# Patient Record
Sex: FEMALE | Race: BLACK OR AFRICAN AMERICAN | Employment: OTHER | URBAN - METROPOLITAN AREA
[De-identification: names, ages, dates, MRNs, and addresses within clinical notes are randomized per-mention and may not be internally consistent; named-entity substitution may affect disease eponyms.]

---

## 2017-01-02 DIAGNOSIS — I10 ESSENTIAL HYPERTENSION: ICD-10-CM

## 2017-01-02 DIAGNOSIS — M54.50 ACUTE BILATERAL LOW BACK PAIN WITHOUT SCIATICA: ICD-10-CM

## 2017-01-02 DIAGNOSIS — R80.9 MICROALBUMINURIA: ICD-10-CM

## 2017-01-02 RX ORDER — LISINOPRIL 10 MG/1
TABLET ORAL
Qty: 30 TABLET | Refills: 1 | Status: SHIPPED | OUTPATIENT
Start: 2017-01-02 | End: 2017-01-14 | Stop reason: SDUPTHER

## 2017-01-03 ENCOUNTER — OFFICE VISIT (OUTPATIENT)
Dept: FAMILY MEDICINE CLINIC | Age: 57
End: 2017-01-03

## 2017-01-03 VITALS
SYSTOLIC BLOOD PRESSURE: 114 MMHG | BODY MASS INDEX: 36.23 KG/M2 | HEART RATE: 81 BPM | HEIGHT: 63 IN | WEIGHT: 204.5 LBS | RESPIRATION RATE: 16 BRPM | TEMPERATURE: 98.9 F | OXYGEN SATURATION: 98 % | DIASTOLIC BLOOD PRESSURE: 62 MMHG

## 2017-01-03 DIAGNOSIS — I10 ESSENTIAL HYPERTENSION: ICD-10-CM

## 2017-01-03 DIAGNOSIS — Z79.4 CONTROLLED TYPE 2 DIABETES MELLITUS WITH MICROALBUMINURIA, WITH LONG-TERM CURRENT USE OF INSULIN (HCC): ICD-10-CM

## 2017-01-03 DIAGNOSIS — R05.9 COUGH: ICD-10-CM

## 2017-01-03 DIAGNOSIS — E11.29 CONTROLLED TYPE 2 DIABETES MELLITUS WITH MICROALBUMINURIA, WITH LONG-TERM CURRENT USE OF INSULIN (HCC): ICD-10-CM

## 2017-01-03 DIAGNOSIS — M54.50 ACUTE BILATERAL LOW BACK PAIN WITHOUT SCIATICA: ICD-10-CM

## 2017-01-03 DIAGNOSIS — E66.09 NON MORBID OBESITY DUE TO EXCESS CALORIES: ICD-10-CM

## 2017-01-03 DIAGNOSIS — R80.9 CONTROLLED TYPE 2 DIABETES MELLITUS WITH MICROALBUMINURIA, WITH LONG-TERM CURRENT USE OF INSULIN (HCC): ICD-10-CM

## 2017-01-03 DIAGNOSIS — J01.00 ACUTE NON-RECURRENT MAXILLARY SINUSITIS: Primary | ICD-10-CM

## 2017-01-03 PROCEDURE — 99214 OFFICE O/P EST MOD 30 MIN: CPT | Performed by: FAMILY MEDICINE

## 2017-01-03 RX ORDER — LEVOFLOXACIN 750 MG/1
750 TABLET ORAL DAILY
Qty: 5 TABLET | Refills: 0 | Status: SHIPPED | OUTPATIENT
Start: 2017-01-03 | End: 2017-01-08

## 2017-01-03 RX ORDER — LEVOFLOXACIN 750 MG/1
750 TABLET ORAL DAILY
Qty: 5 TABLET | Refills: 0 | Status: SHIPPED | OUTPATIENT
Start: 2017-01-03 | End: 2017-01-03 | Stop reason: SDUPTHER

## 2017-01-03 RX ORDER — BENZONATATE 100 MG/1
100 CAPSULE ORAL 3 TIMES DAILY PRN
Qty: 21 CAPSULE | Refills: 0 | Status: SHIPPED | OUTPATIENT
Start: 2017-01-03 | End: 2017-04-12

## 2017-01-03 ASSESSMENT — ENCOUNTER SYMPTOMS
PHOTOPHOBIA: 0
CHOKING: 0
CHEST TIGHTNESS: 0
RHINORRHEA: 1
SORE THROAT: 0
FACIAL SWELLING: 0
EYE PAIN: 0
ANAL BLEEDING: 0
STRIDOR: 0
DIARRHEA: 0
SINUS PRESSURE: 1
ABDOMINAL DISTENTION: 0
TROUBLE SWALLOWING: 0
WHEEZING: 0
VOMITING: 0
COUGH: 1
CONSTIPATION: 0
BLOOD IN STOOL: 0
SHORTNESS OF BREATH: 0
ABDOMINAL PAIN: 0
EYE ITCHING: 0
EYE DISCHARGE: 0
VOICE CHANGE: 0
APNEA: 0
RECTAL PAIN: 0
NAUSEA: 0
EYE REDNESS: 0
BACK PAIN: 1

## 2017-01-05 ENCOUNTER — TELEPHONE (OUTPATIENT)
Dept: FAMILY MEDICINE CLINIC | Age: 57
End: 2017-01-05

## 2017-01-11 ENCOUNTER — TELEPHONE (OUTPATIENT)
Dept: FAMILY MEDICINE CLINIC | Age: 57
End: 2017-01-11

## 2017-01-12 ENCOUNTER — TELEPHONE (OUTPATIENT)
Dept: FAMILY MEDICINE CLINIC | Age: 57
End: 2017-01-12

## 2017-01-13 ENCOUNTER — TELEPHONE (OUTPATIENT)
Dept: FAMILY MEDICINE CLINIC | Age: 57
End: 2017-01-13

## 2017-01-14 DIAGNOSIS — I10 ESSENTIAL HYPERTENSION: ICD-10-CM

## 2017-01-14 DIAGNOSIS — R80.9 MICROALBUMINURIA: ICD-10-CM

## 2017-01-14 RX ORDER — FLUTICASONE PROPIONATE 50 MCG
1 SPRAY, SUSPENSION (ML) NASAL DAILY
Qty: 1 BOTTLE | Refills: 0 | Status: SHIPPED | OUTPATIENT
Start: 2017-01-14 | End: 2017-04-12

## 2017-01-14 RX ORDER — LISINOPRIL 10 MG/1
TABLET ORAL
Qty: 30 TABLET | Refills: 1 | Status: SHIPPED | OUTPATIENT
Start: 2017-01-14 | End: 2017-04-29 | Stop reason: SDUPTHER

## 2017-01-23 RX ORDER — SIMVASTATIN 20 MG
TABLET ORAL
Qty: 30 TABLET | Refills: 2 | Status: SHIPPED | OUTPATIENT
Start: 2017-01-23 | End: 2017-04-12 | Stop reason: SDUPTHER

## 2017-02-05 DIAGNOSIS — M54.50 ACUTE BILATERAL LOW BACK PAIN WITHOUT SCIATICA: ICD-10-CM

## 2017-02-07 ENCOUNTER — OFFICE VISIT (OUTPATIENT)
Dept: FAMILY MEDICINE CLINIC | Age: 57
End: 2017-02-07

## 2017-02-07 VITALS
SYSTOLIC BLOOD PRESSURE: 131 MMHG | HEIGHT: 63 IN | RESPIRATION RATE: 16 BRPM | OXYGEN SATURATION: 98 % | WEIGHT: 206.9 LBS | HEART RATE: 81 BPM | BODY MASS INDEX: 36.66 KG/M2 | DIASTOLIC BLOOD PRESSURE: 75 MMHG | TEMPERATURE: 98 F

## 2017-02-07 DIAGNOSIS — Z79.4 CONTROLLED TYPE 2 DIABETES MELLITUS WITHOUT COMPLICATION, WITH LONG-TERM CURRENT USE OF INSULIN (HCC): ICD-10-CM

## 2017-02-07 DIAGNOSIS — E78.5 HYPERLIPIDEMIA LDL GOAL <100: ICD-10-CM

## 2017-02-07 DIAGNOSIS — I10 ESSENTIAL HYPERTENSION: ICD-10-CM

## 2017-02-07 DIAGNOSIS — E11.9 CONTROLLED TYPE 2 DIABETES MELLITUS WITHOUT COMPLICATION, WITH LONG-TERM CURRENT USE OF INSULIN (HCC): ICD-10-CM

## 2017-02-07 DIAGNOSIS — E66.09 NON MORBID OBESITY DUE TO EXCESS CALORIES: ICD-10-CM

## 2017-02-07 DIAGNOSIS — M54.50 ACUTE BILATERAL LOW BACK PAIN WITHOUT SCIATICA: Primary | ICD-10-CM

## 2017-02-07 PROCEDURE — 99214 OFFICE O/P EST MOD 30 MIN: CPT | Performed by: FAMILY MEDICINE

## 2017-02-07 RX ORDER — CYCLOBENZAPRINE HCL 5 MG
5 TABLET ORAL EVERY 8 HOURS PRN
Qty: 30 TABLET | Refills: 0 | Status: SHIPPED | OUTPATIENT
Start: 2017-02-07 | End: 2017-04-11 | Stop reason: SDUPTHER

## 2017-02-07 ASSESSMENT — ENCOUNTER SYMPTOMS
APNEA: 0
ABDOMINAL PAIN: 0
COUGH: 0
NAUSEA: 0
VOMITING: 0
CONSTIPATION: 0
DIARRHEA: 0
ABDOMINAL DISTENTION: 0
WHEEZING: 0
SHORTNESS OF BREATH: 0

## 2017-02-13 ENCOUNTER — TELEPHONE (OUTPATIENT)
Dept: FAMILY MEDICINE CLINIC | Age: 57
End: 2017-02-13

## 2017-02-13 DIAGNOSIS — M54.50 ACUTE BILATERAL LOW BACK PAIN WITHOUT SCIATICA: Primary | ICD-10-CM

## 2017-02-13 DIAGNOSIS — Z86.73 HISTORY OF TIA (TRANSIENT ISCHEMIC ATTACK): ICD-10-CM

## 2017-03-02 ENCOUNTER — HOSPITAL ENCOUNTER (OUTPATIENT)
Dept: OTHER | Age: 57
Discharge: HOME | End: 2017-03-02
Attending: FAMILY MEDICINE | Admitting: FAMILY MEDICINE

## 2017-03-02 DIAGNOSIS — M54.50 ACUTE BILATERAL LOW BACK PAIN WITHOUT SCIATICA: ICD-10-CM

## 2017-03-02 LAB
A/G RATIO: 1.1 (ref 1.1–2.2)
ALBUMIN SERPL-MCNC: 4.3 G/DL (ref 3.4–5)
ALP BLD-CCNC: 119 U/L (ref 40–129)
ALT SERPL-CCNC: 26 U/L (ref 10–40)
ANION GAP SERPL CALCULATED.3IONS-SCNC: 15 MMOL/L (ref 3–16)
AST SERPL-CCNC: 21 U/L (ref 15–37)
BILIRUB SERPL-MCNC: 0.3 MG/DL (ref 0–1)
BUN BLDV-MCNC: 19 MG/DL (ref 7–20)
CALCIUM SERPL-MCNC: 10.4 MG/DL (ref 8.3–10.6)
CHLORIDE BLD-SCNC: 99 MMOL/L (ref 99–110)
CHOLESTEROL, TOTAL: 143 MG/DL (ref 0–199)
CO2: 25 MMOL/L (ref 21–32)
CREAT SERPL-MCNC: 1 MG/DL (ref 0.6–1.1)
GFR AFRICAN AMERICAN: >60
GFR NON-AFRICAN AMERICAN: 57
GLOBULIN: 3.8 G/DL
GLUCOSE BLD-MCNC: 115 MG/DL (ref 70–99)
HDLC SERPL-MCNC: 67 MG/DL (ref 40–60)
LDL CHOLESTEROL CALCULATED: 57 MG/DL
POTASSIUM SERPL-SCNC: 4.6 MMOL/L (ref 3.5–5.1)
SODIUM BLD-SCNC: 139 MMOL/L (ref 136–145)
TOTAL PROTEIN: 8.1 G/DL (ref 6.4–8.2)
TRIGL SERPL-MCNC: 93 MG/DL (ref 0–150)
VLDLC SERPL CALC-MCNC: 19 MG/DL

## 2017-03-10 ENCOUNTER — OFFICE VISIT (OUTPATIENT)
Dept: FAMILY MEDICINE CLINIC | Age: 57
End: 2017-03-10

## 2017-03-10 VITALS
WEIGHT: 210.4 LBS | OXYGEN SATURATION: 98 % | DIASTOLIC BLOOD PRESSURE: 61 MMHG | RESPIRATION RATE: 16 BRPM | HEART RATE: 81 BPM | TEMPERATURE: 97.8 F | HEIGHT: 63 IN | SYSTOLIC BLOOD PRESSURE: 132 MMHG | BODY MASS INDEX: 37.28 KG/M2

## 2017-03-10 DIAGNOSIS — Z79.4 CONTROLLED TYPE 2 DIABETES MELLITUS WITHOUT COMPLICATION, WITH LONG-TERM CURRENT USE OF INSULIN (HCC): ICD-10-CM

## 2017-03-10 DIAGNOSIS — E11.9 CONTROLLED TYPE 2 DIABETES MELLITUS WITHOUT COMPLICATION, WITH LONG-TERM CURRENT USE OF INSULIN (HCC): ICD-10-CM

## 2017-03-10 DIAGNOSIS — M47.816 SPONDYLOSIS OF LUMBAR REGION WITHOUT MYELOPATHY OR RADICULOPATHY: ICD-10-CM

## 2017-03-10 DIAGNOSIS — Z12.11 COLON CANCER SCREENING: ICD-10-CM

## 2017-03-10 DIAGNOSIS — E66.09 NON MORBID OBESITY DUE TO EXCESS CALORIES: ICD-10-CM

## 2017-03-10 DIAGNOSIS — E78.5 HYPERLIPIDEMIA LDL GOAL <100: ICD-10-CM

## 2017-03-10 DIAGNOSIS — I10 ESSENTIAL HYPERTENSION: Primary | ICD-10-CM

## 2017-03-10 DIAGNOSIS — M54.50 ACUTE BILATERAL LOW BACK PAIN WITHOUT SCIATICA: ICD-10-CM

## 2017-03-10 PROCEDURE — 99214 OFFICE O/P EST MOD 30 MIN: CPT | Performed by: FAMILY MEDICINE

## 2017-03-10 ASSESSMENT — ENCOUNTER SYMPTOMS
CHEST TIGHTNESS: 0
APNEA: 0
VOMITING: 0
ABDOMINAL PAIN: 0
ANAL BLEEDING: 0
ABDOMINAL DISTENTION: 0
SHORTNESS OF BREATH: 0
NAUSEA: 0
WHEEZING: 0
RECTAL PAIN: 0
CHOKING: 0
BLOOD IN STOOL: 0
STRIDOR: 0
CONSTIPATION: 0
DIARRHEA: 0
COUGH: 0

## 2017-03-13 DIAGNOSIS — M54.50 ACUTE BILATERAL LOW BACK PAIN WITHOUT SCIATICA: ICD-10-CM

## 2017-03-16 ENCOUNTER — TELEPHONE (OUTPATIENT)
Dept: FAMILY MEDICINE CLINIC | Age: 57
End: 2017-03-16

## 2017-03-24 ENCOUNTER — TELEPHONE (OUTPATIENT)
Dept: FAMILY MEDICINE CLINIC | Age: 57
End: 2017-03-24

## 2017-03-24 ENCOUNTER — HOSPITAL ENCOUNTER (OUTPATIENT)
Dept: PHYSICAL THERAPY | Age: 57
Discharge: HOME | End: 2017-03-31
Admitting: FAMILY MEDICINE

## 2017-03-24 DIAGNOSIS — M54.50 ACUTE BILATERAL LOW BACK PAIN WITHOUT SCIATICA: Primary | ICD-10-CM

## 2017-03-24 ASSESSMENT — PAIN DESCRIPTION - PAIN TYPE: TYPE: CHRONIC PAIN

## 2017-03-24 ASSESSMENT — PAIN SCALES - GENERAL: PAINLEVEL_OUTOF10: 4

## 2017-03-30 ENCOUNTER — HOSPITAL ENCOUNTER (OUTPATIENT)
Dept: PHYSICAL THERAPY | Age: 57
Discharge: HOME | End: 2017-03-30
Admitting: FAMILY MEDICINE

## 2017-04-10 ENCOUNTER — TELEPHONE (OUTPATIENT)
Dept: FAMILY MEDICINE CLINIC | Age: 57
End: 2017-04-10

## 2017-04-10 DIAGNOSIS — Z12.11 COLON CANCER SCREENING: Primary | ICD-10-CM

## 2017-04-11 DIAGNOSIS — M54.50 ACUTE BILATERAL LOW BACK PAIN WITHOUT SCIATICA: ICD-10-CM

## 2017-04-11 RX ORDER — CYCLOBENZAPRINE HCL 5 MG
TABLET ORAL
Qty: 30 TABLET | Refills: 0 | Status: SHIPPED | OUTPATIENT
Start: 2017-04-11 | End: 2017-05-08 | Stop reason: SDUPTHER

## 2017-04-12 ENCOUNTER — OFFICE VISIT (OUTPATIENT)
Dept: FAMILY MEDICINE CLINIC | Age: 57
End: 2017-04-12

## 2017-04-12 VITALS
RESPIRATION RATE: 16 BRPM | WEIGHT: 208 LBS | BODY MASS INDEX: 36.86 KG/M2 | HEART RATE: 81 BPM | TEMPERATURE: 98.5 F | DIASTOLIC BLOOD PRESSURE: 66 MMHG | HEIGHT: 63 IN | SYSTOLIC BLOOD PRESSURE: 135 MMHG | OXYGEN SATURATION: 98 %

## 2017-04-12 DIAGNOSIS — E78.5 HYPERLIPIDEMIA LDL GOAL <100: ICD-10-CM

## 2017-04-12 DIAGNOSIS — M54.50 ACUTE BILATERAL LOW BACK PAIN WITHOUT SCIATICA: ICD-10-CM

## 2017-04-12 DIAGNOSIS — E11.9 CONTROLLED TYPE 2 DIABETES MELLITUS WITHOUT COMPLICATION, WITH LONG-TERM CURRENT USE OF INSULIN (HCC): ICD-10-CM

## 2017-04-12 DIAGNOSIS — E66.09 NON MORBID OBESITY DUE TO EXCESS CALORIES: ICD-10-CM

## 2017-04-12 DIAGNOSIS — I10 ESSENTIAL HYPERTENSION: Primary | ICD-10-CM

## 2017-04-12 DIAGNOSIS — M47.816 SPONDYLOSIS OF LUMBAR REGION WITHOUT MYELOPATHY OR RADICULOPATHY: ICD-10-CM

## 2017-04-12 DIAGNOSIS — Z79.4 CONTROLLED TYPE 2 DIABETES MELLITUS WITHOUT COMPLICATION, WITH LONG-TERM CURRENT USE OF INSULIN (HCC): ICD-10-CM

## 2017-04-12 PROCEDURE — 99214 OFFICE O/P EST MOD 30 MIN: CPT | Performed by: FAMILY MEDICINE

## 2017-04-12 RX ORDER — SIMVASTATIN 20 MG
TABLET ORAL
Qty: 30 TABLET | Refills: 2 | Status: SHIPPED | OUTPATIENT
Start: 2017-04-12 | End: 2017-07-19 | Stop reason: SDUPTHER

## 2017-04-12 ASSESSMENT — ENCOUNTER SYMPTOMS
WHEEZING: 0
STRIDOR: 0
SHORTNESS OF BREATH: 0
BLOOD IN STOOL: 0
RECTAL PAIN: 0
CONSTIPATION: 0
DIARRHEA: 0
NAUSEA: 0
ABDOMINAL PAIN: 0
ABDOMINAL DISTENTION: 0
VOMITING: 0
APNEA: 0
CHEST TIGHTNESS: 0
CHOKING: 0
COUGH: 0
ANAL BLEEDING: 0

## 2017-04-25 ENCOUNTER — HOSPITAL ENCOUNTER (OUTPATIENT)
Dept: PHYSICAL THERAPY | Age: 57
Discharge: HOME | End: 2017-04-25
Admitting: FAMILY MEDICINE

## 2017-04-25 ENCOUNTER — TELEPHONE (OUTPATIENT)
Dept: FAMILY MEDICINE CLINIC | Age: 57
End: 2017-04-25

## 2017-04-25 DIAGNOSIS — M54.50 ACUTE BILATERAL LOW BACK PAIN WITHOUT SCIATICA: ICD-10-CM

## 2017-04-25 DIAGNOSIS — M47.816 SPONDYLOSIS OF LUMBAR REGION WITHOUT MYELOPATHY OR RADICULOPATHY: ICD-10-CM

## 2017-04-25 DIAGNOSIS — E11.9 CONTROLLED TYPE 2 DIABETES MELLITUS WITHOUT COMPLICATION, WITH LONG-TERM CURRENT USE OF INSULIN (HCC): ICD-10-CM

## 2017-04-25 DIAGNOSIS — Z79.4 CONTROLLED TYPE 2 DIABETES MELLITUS WITHOUT COMPLICATION, WITH LONG-TERM CURRENT USE OF INSULIN (HCC): ICD-10-CM

## 2017-04-25 DIAGNOSIS — I10 ESSENTIAL HYPERTENSION: Primary | ICD-10-CM

## 2017-04-25 DIAGNOSIS — E66.09 NON MORBID OBESITY DUE TO EXCESS CALORIES: ICD-10-CM

## 2017-04-29 DIAGNOSIS — R80.9 MICROALBUMINURIA: ICD-10-CM

## 2017-04-29 DIAGNOSIS — I10 ESSENTIAL HYPERTENSION: ICD-10-CM

## 2017-04-29 RX ORDER — LISINOPRIL 10 MG/1
TABLET ORAL
Qty: 30 TABLET | Refills: 1 | Status: SHIPPED | OUTPATIENT
Start: 2017-04-29 | End: 2017-06-26 | Stop reason: SDUPTHER

## 2017-05-02 ENCOUNTER — HOSPITAL ENCOUNTER (OUTPATIENT)
Dept: PHYSICAL THERAPY | Age: 57
Discharge: HOME | End: 2017-05-02
Admitting: FAMILY MEDICINE

## 2017-05-04 ENCOUNTER — HOSPITAL ENCOUNTER (OUTPATIENT)
Dept: PHYSICAL THERAPY | Age: 57
Discharge: HOME | End: 2017-05-04
Admitting: FAMILY MEDICINE

## 2017-05-08 DIAGNOSIS — M54.50 ACUTE BILATERAL LOW BACK PAIN WITHOUT SCIATICA: ICD-10-CM

## 2017-05-08 RX ORDER — CYCLOBENZAPRINE HCL 5 MG
TABLET ORAL
Qty: 30 TABLET | Refills: 0 | Status: SHIPPED | OUTPATIENT
Start: 2017-05-08 | End: 2017-06-10 | Stop reason: SDUPTHER

## 2017-05-12 ENCOUNTER — OFFICE VISIT (OUTPATIENT)
Dept: FAMILY MEDICINE CLINIC | Age: 57
End: 2017-05-12

## 2017-05-12 VITALS
OXYGEN SATURATION: 98 % | HEART RATE: 81 BPM | BODY MASS INDEX: 37.56 KG/M2 | WEIGHT: 212 LBS | TEMPERATURE: 98.8 F | DIASTOLIC BLOOD PRESSURE: 72 MMHG | RESPIRATION RATE: 16 BRPM | SYSTOLIC BLOOD PRESSURE: 118 MMHG | HEIGHT: 63 IN

## 2017-05-12 DIAGNOSIS — I10 ESSENTIAL HYPERTENSION: Primary | ICD-10-CM

## 2017-05-12 DIAGNOSIS — M54.50 ACUTE BILATERAL LOW BACK PAIN WITHOUT SCIATICA: ICD-10-CM

## 2017-05-12 DIAGNOSIS — R80.9 CONTROLLED TYPE 2 DIABETES MELLITUS WITH MICROALBUMINURIA, WITH LONG-TERM CURRENT USE OF INSULIN (HCC): ICD-10-CM

## 2017-05-12 DIAGNOSIS — Z79.4 CONTROLLED TYPE 2 DIABETES MELLITUS WITH MICROALBUMINURIA, WITH LONG-TERM CURRENT USE OF INSULIN (HCC): ICD-10-CM

## 2017-05-12 DIAGNOSIS — E78.5 HYPERLIPIDEMIA LDL GOAL <100: ICD-10-CM

## 2017-05-12 DIAGNOSIS — E66.09 NON MORBID OBESITY DUE TO EXCESS CALORIES: ICD-10-CM

## 2017-05-12 DIAGNOSIS — E11.29 CONTROLLED TYPE 2 DIABETES MELLITUS WITH MICROALBUMINURIA, WITH LONG-TERM CURRENT USE OF INSULIN (HCC): ICD-10-CM

## 2017-05-12 PROCEDURE — 99214 OFFICE O/P EST MOD 30 MIN: CPT | Performed by: FAMILY MEDICINE

## 2017-05-12 ASSESSMENT — ENCOUNTER SYMPTOMS
ABDOMINAL DISTENTION: 0
WHEEZING: 0
SHORTNESS OF BREATH: 0
RECTAL PAIN: 0
APNEA: 0
BLOOD IN STOOL: 0
COUGH: 0
VOMITING: 0
DIARRHEA: 0
CONSTIPATION: 0
NAUSEA: 0
STRIDOR: 0
ABDOMINAL PAIN: 0
CHOKING: 0
ANAL BLEEDING: 0
CHEST TIGHTNESS: 0

## 2017-05-12 ASSESSMENT — PATIENT HEALTH QUESTIONNAIRE - PHQ9
2. FEELING DOWN, DEPRESSED OR HOPELESS: 0
1. LITTLE INTEREST OR PLEASURE IN DOING THINGS: 0
SUM OF ALL RESPONSES TO PHQ9 QUESTIONS 1 & 2: 0
SUM OF ALL RESPONSES TO PHQ QUESTIONS 1-9: 0

## 2017-05-15 DIAGNOSIS — M54.50 ACUTE BILATERAL LOW BACK PAIN WITHOUT SCIATICA: ICD-10-CM

## 2017-05-16 ENCOUNTER — HOSPITAL ENCOUNTER (OUTPATIENT)
Dept: PHYSICAL THERAPY | Age: 57
Discharge: HOME | End: 2017-05-16
Admitting: FAMILY MEDICINE

## 2017-05-23 ENCOUNTER — HOSPITAL ENCOUNTER (OUTPATIENT)
Dept: PHYSICAL THERAPY | Age: 57
Discharge: HOME | End: 2017-05-23
Admitting: FAMILY MEDICINE

## 2017-06-01 DIAGNOSIS — Z79.4 CONTROLLED TYPE 2 DIABETES MELLITUS WITH MICROALBUMINURIA, WITH LONG-TERM CURRENT USE OF INSULIN (HCC): ICD-10-CM

## 2017-06-01 DIAGNOSIS — R80.9 CONTROLLED TYPE 2 DIABETES MELLITUS WITH MICROALBUMINURIA, WITH LONG-TERM CURRENT USE OF INSULIN (HCC): ICD-10-CM

## 2017-06-01 DIAGNOSIS — E78.5 HYPERLIPIDEMIA LDL GOAL <100: ICD-10-CM

## 2017-06-01 DIAGNOSIS — I10 ESSENTIAL HYPERTENSION: ICD-10-CM

## 2017-06-01 DIAGNOSIS — E11.29 CONTROLLED TYPE 2 DIABETES MELLITUS WITH MICROALBUMINURIA, WITH LONG-TERM CURRENT USE OF INSULIN (HCC): ICD-10-CM

## 2017-06-01 LAB
A/G RATIO: 1.2 (ref 1.1–2.2)
ALBUMIN SERPL-MCNC: 4.4 G/DL (ref 3.4–5)
ALP BLD-CCNC: 139 U/L (ref 40–129)
ALT SERPL-CCNC: 31 U/L (ref 10–40)
ANION GAP SERPL CALCULATED.3IONS-SCNC: 14 MMOL/L (ref 3–16)
AST SERPL-CCNC: 23 U/L (ref 15–37)
BILIRUB SERPL-MCNC: <0.2 MG/DL (ref 0–1)
BUN BLDV-MCNC: 16 MG/DL (ref 7–20)
CALCIUM SERPL-MCNC: 10.3 MG/DL (ref 8.3–10.6)
CHLORIDE BLD-SCNC: 100 MMOL/L (ref 99–110)
CHOLESTEROL, TOTAL: 123 MG/DL (ref 0–199)
CO2: 26 MMOL/L (ref 21–32)
CREAT SERPL-MCNC: 0.9 MG/DL (ref 0.6–1.1)
GFR AFRICAN AMERICAN: >60
GFR NON-AFRICAN AMERICAN: >60
GLOBULIN: 3.6 G/DL
GLUCOSE BLD-MCNC: 139 MG/DL (ref 70–99)
HDLC SERPL-MCNC: 62 MG/DL (ref 40–60)
LDL CHOLESTEROL CALCULATED: 42 MG/DL
POTASSIUM SERPL-SCNC: 4.3 MMOL/L (ref 3.5–5.1)
SODIUM BLD-SCNC: 140 MMOL/L (ref 136–145)
TOTAL PROTEIN: 8 G/DL (ref 6.4–8.2)
TRIGL SERPL-MCNC: 97 MG/DL (ref 0–150)
VLDLC SERPL CALC-MCNC: 19 MG/DL

## 2017-06-02 LAB
ESTIMATED AVERAGE GLUCOSE: 194.4 MG/DL
HBA1C MFR BLD: 8.4 %

## 2017-06-10 DIAGNOSIS — M54.50 ACUTE BILATERAL LOW BACK PAIN WITHOUT SCIATICA: ICD-10-CM

## 2017-06-11 RX ORDER — CYCLOBENZAPRINE HCL 5 MG
TABLET ORAL
Qty: 30 TABLET | Refills: 0 | Status: SHIPPED | OUTPATIENT
Start: 2017-06-11 | End: 2017-07-13 | Stop reason: SDUPTHER

## 2017-06-15 DIAGNOSIS — M54.50 ACUTE BILATERAL LOW BACK PAIN WITHOUT SCIATICA: ICD-10-CM

## 2017-06-20 ENCOUNTER — OFFICE VISIT (OUTPATIENT)
Dept: FAMILY MEDICINE CLINIC | Age: 57
End: 2017-06-20

## 2017-06-20 VITALS
WEIGHT: 209.9 LBS | RESPIRATION RATE: 16 BRPM | SYSTOLIC BLOOD PRESSURE: 131 MMHG | HEIGHT: 63 IN | BODY MASS INDEX: 37.19 KG/M2 | DIASTOLIC BLOOD PRESSURE: 70 MMHG | OXYGEN SATURATION: 97 % | TEMPERATURE: 98.6 F | HEART RATE: 76 BPM

## 2017-06-20 DIAGNOSIS — I10 ESSENTIAL HYPERTENSION: ICD-10-CM

## 2017-06-20 DIAGNOSIS — E66.09 NON MORBID OBESITY DUE TO EXCESS CALORIES: ICD-10-CM

## 2017-06-20 DIAGNOSIS — M54.50 ACUTE BILATERAL LOW BACK PAIN WITHOUT SCIATICA: ICD-10-CM

## 2017-06-20 DIAGNOSIS — E78.5 HYPERLIPIDEMIA LDL GOAL <100: ICD-10-CM

## 2017-06-20 DIAGNOSIS — R74.8 ALKALINE PHOSPHATASE ELEVATION: ICD-10-CM

## 2017-06-20 PROCEDURE — 99214 OFFICE O/P EST MOD 30 MIN: CPT | Performed by: FAMILY MEDICINE

## 2017-06-20 ASSESSMENT — ENCOUNTER SYMPTOMS
RECTAL PAIN: 0
CONSTIPATION: 0
ABDOMINAL DISTENTION: 0
COLOR CHANGE: 0
WHEEZING: 0
ABDOMINAL PAIN: 0
NAUSEA: 0
ANAL BLEEDING: 0
CHOKING: 0
SHORTNESS OF BREATH: 0
APNEA: 0
CHEST TIGHTNESS: 0
COUGH: 0
STRIDOR: 0
VOMITING: 0
DIARRHEA: 0
BLOOD IN STOOL: 0

## 2017-06-26 DIAGNOSIS — R80.9 MICROALBUMINURIA: ICD-10-CM

## 2017-06-26 DIAGNOSIS — I10 ESSENTIAL HYPERTENSION: ICD-10-CM

## 2017-06-26 RX ORDER — LISINOPRIL 10 MG/1
TABLET ORAL
Qty: 30 TABLET | Refills: 1 | Status: ON HOLD | OUTPATIENT
Start: 2017-06-26 | End: 2017-07-05 | Stop reason: DRUGHIGH

## 2017-07-03 ENCOUNTER — HOSPITAL ENCOUNTER (OUTPATIENT)
Dept: MRI IMAGING | Age: 57
Discharge: HOME | End: 2017-07-03
Attending: FAMILY MEDICINE | Admitting: FAMILY MEDICINE

## 2017-07-03 DIAGNOSIS — M54.50 ACUTE BILATERAL LOW BACK PAIN WITHOUT SCIATICA: ICD-10-CM

## 2017-07-03 DIAGNOSIS — M54.50 LOW BACK PAIN: ICD-10-CM

## 2017-07-04 DIAGNOSIS — M54.50 ACUTE BILATERAL LOW BACK PAIN WITHOUT SCIATICA: ICD-10-CM

## 2017-07-04 DIAGNOSIS — R93.7 ABNORMAL MRI, LUMBAR SPINE: Primary | ICD-10-CM

## 2017-07-05 PROBLEM — R29.6 FREQUENT FALLS: Status: ACTIVE | Noted: 2017-07-05

## 2017-07-05 PROBLEM — E87.20 LACTIC ACIDOSIS: Status: ACTIVE | Noted: 2017-07-05

## 2017-07-05 PROBLEM — M47.816 SPONDYLOSIS OF LUMBAR REGION WITHOUT MYELOPATHY OR RADICULOPATHY: Status: RESOLVED | Noted: 2017-03-10 | Resolved: 2017-07-05

## 2017-07-05 PROBLEM — E11.29 CONTROLLED TYPE 2 DIABETES MELLITUS WITH MICROALBUMINURIA, WITH LONG-TERM CURRENT USE OF INSULIN (HCC): Status: RESOLVED | Noted: 2017-01-03 | Resolved: 2017-07-05

## 2017-07-05 PROBLEM — N17.9 AKI (ACUTE KIDNEY INJURY) (HCC): Status: ACTIVE | Noted: 2017-07-05

## 2017-07-05 PROBLEM — R93.7 ABNORMAL MRI, LUMBAR SPINE: Status: RESOLVED | Noted: 2017-07-04 | Resolved: 2017-07-05

## 2017-07-05 PROBLEM — R80.9 CONTROLLED TYPE 2 DIABETES MELLITUS WITH MICROALBUMINURIA, WITH LONG-TERM CURRENT USE OF INSULIN (HCC): Status: RESOLVED | Noted: 2017-01-03 | Resolved: 2017-07-05

## 2017-07-05 PROBLEM — E11.01 HHNC (HYPERGLYCEMIC HYPEROSMOLAR NONKETOTIC COMA) (HCC): Status: ACTIVE | Noted: 2017-07-05

## 2017-07-05 PROBLEM — Z79.4 CONTROLLED TYPE 2 DIABETES MELLITUS WITH MICROALBUMINURIA, WITH LONG-TERM CURRENT USE OF INSULIN (HCC): Status: RESOLVED | Noted: 2017-01-03 | Resolved: 2017-07-05

## 2017-07-05 PROBLEM — F41.8 DEPRESSION WITH ANXIETY: Chronic | Status: ACTIVE | Noted: 2017-07-05

## 2017-07-05 PROBLEM — E66.09 NON MORBID OBESITY DUE TO EXCESS CALORIES: Status: RESOLVED | Noted: 2017-04-12 | Resolved: 2017-07-05

## 2017-07-05 PROBLEM — R74.8 ALKALINE PHOSPHATASE ELEVATION: Status: RESOLVED | Noted: 2017-06-20 | Resolved: 2017-07-05

## 2017-07-07 ENCOUNTER — TELEPHONE (OUTPATIENT)
Dept: FAMILY MEDICINE CLINIC | Age: 57
End: 2017-07-07

## 2017-07-10 ENCOUNTER — TELEPHONE (OUTPATIENT)
Dept: FAMILY MEDICINE CLINIC | Age: 57
End: 2017-07-10

## 2017-07-10 ENCOUNTER — CARE COORDINATION (OUTPATIENT)
Dept: CARE COORDINATION | Age: 57
End: 2017-07-10

## 2017-07-11 ENCOUNTER — TELEPHONE (OUTPATIENT)
Dept: FAMILY MEDICINE CLINIC | Age: 57
End: 2017-07-11

## 2017-07-13 DIAGNOSIS — M54.50 ACUTE BILATERAL LOW BACK PAIN WITHOUT SCIATICA: ICD-10-CM

## 2017-07-13 RX ORDER — CYCLOBENZAPRINE HCL 5 MG
TABLET ORAL
Qty: 30 TABLET | Refills: 0 | Status: SHIPPED | OUTPATIENT
Start: 2017-07-13 | End: 2017-08-15 | Stop reason: SDUPTHER

## 2017-07-14 ENCOUNTER — TELEPHONE (OUTPATIENT)
Dept: FAMILY MEDICINE CLINIC | Age: 57
End: 2017-07-14

## 2017-07-14 RX ORDER — PEN NEEDLE, DIABETIC 31 GX5/16"
NEEDLE, DISPOSABLE MISCELLANEOUS
Qty: 100 EACH | Refills: 11 | Status: SHIPPED | OUTPATIENT
Start: 2017-07-14 | End: 2018-01-31 | Stop reason: SDUPTHER

## 2017-07-17 ENCOUNTER — TELEPHONE (OUTPATIENT)
Dept: FAMILY MEDICINE CLINIC | Age: 57
End: 2017-07-17

## 2017-07-19 DIAGNOSIS — E78.5 HYPERLIPIDEMIA LDL GOAL <100: ICD-10-CM

## 2017-07-19 RX ORDER — SIMVASTATIN 20 MG
TABLET ORAL
Qty: 30 TABLET | Refills: 2 | Status: SHIPPED | OUTPATIENT
Start: 2017-07-19 | End: 2017-10-11 | Stop reason: SDUPTHER

## 2017-07-20 ENCOUNTER — OFFICE VISIT (OUTPATIENT)
Dept: FAMILY MEDICINE CLINIC | Age: 57
End: 2017-07-20

## 2017-07-20 VITALS
OXYGEN SATURATION: 98 % | HEART RATE: 84 BPM | SYSTOLIC BLOOD PRESSURE: 116 MMHG | DIASTOLIC BLOOD PRESSURE: 64 MMHG | WEIGHT: 204.3 LBS | RESPIRATION RATE: 16 BRPM | TEMPERATURE: 98.7 F | BODY MASS INDEX: 36.2 KG/M2 | HEIGHT: 63 IN

## 2017-07-20 DIAGNOSIS — R47.81 SLURRED SPEECH: Primary | ICD-10-CM

## 2017-07-20 DIAGNOSIS — R93.7 ABNORMAL MRI, LUMBAR SPINE: ICD-10-CM

## 2017-07-20 DIAGNOSIS — E78.5 HYPERLIPIDEMIA LDL GOAL <100: Chronic | ICD-10-CM

## 2017-07-20 DIAGNOSIS — E23.6 EMPTY SELLA (HCC): ICD-10-CM

## 2017-07-20 DIAGNOSIS — E66.09 NON MORBID OBESITY DUE TO EXCESS CALORIES: ICD-10-CM

## 2017-07-20 DIAGNOSIS — R90.89 ABNORMAL BRAIN MRI: ICD-10-CM

## 2017-07-20 DIAGNOSIS — I10 ESSENTIAL HYPERTENSION: Chronic | ICD-10-CM

## 2017-07-20 DIAGNOSIS — M54.50 ACUTE BILATERAL LOW BACK PAIN WITHOUT SCIATICA: ICD-10-CM

## 2017-07-20 PROCEDURE — 99214 OFFICE O/P EST MOD 30 MIN: CPT | Performed by: FAMILY MEDICINE

## 2017-07-20 ASSESSMENT — ENCOUNTER SYMPTOMS
COLOR CHANGE: 0
STRIDOR: 0
BLOOD IN STOOL: 0
WHEEZING: 0
APNEA: 0
DIARRHEA: 0
ABDOMINAL PAIN: 0
CHEST TIGHTNESS: 0
VOMITING: 0
COUGH: 0
RECTAL PAIN: 0
ANAL BLEEDING: 0
CONSTIPATION: 0
SHORTNESS OF BREATH: 0
ABDOMINAL DISTENTION: 0
NAUSEA: 0
CHOKING: 0

## 2017-08-15 DIAGNOSIS — M54.50 ACUTE BILATERAL LOW BACK PAIN WITHOUT SCIATICA: ICD-10-CM

## 2017-08-15 RX ORDER — CYCLOBENZAPRINE HCL 5 MG
TABLET ORAL
Qty: 30 TABLET | Refills: 0 | Status: SHIPPED | OUTPATIENT
Start: 2017-08-15 | End: 2017-09-08 | Stop reason: SDUPTHER

## 2017-08-21 DIAGNOSIS — R80.9 MICROALBUMINURIA: ICD-10-CM

## 2017-08-21 DIAGNOSIS — I10 ESSENTIAL HYPERTENSION: ICD-10-CM

## 2017-08-21 RX ORDER — LISINOPRIL 10 MG/1
TABLET ORAL
Qty: 30 TABLET | Refills: 1 | Status: SHIPPED | OUTPATIENT
Start: 2017-08-21 | End: 2017-12-18 | Stop reason: SDUPTHER

## 2017-08-28 DIAGNOSIS — M54.50 ACUTE BILATERAL LOW BACK PAIN WITHOUT SCIATICA: ICD-10-CM

## 2017-09-01 ENCOUNTER — OFFICE VISIT (OUTPATIENT)
Dept: FAMILY MEDICINE CLINIC | Age: 57
End: 2017-09-01

## 2017-09-01 VITALS
OXYGEN SATURATION: 98 % | DIASTOLIC BLOOD PRESSURE: 62 MMHG | RESPIRATION RATE: 16 BRPM | HEART RATE: 82 BPM | WEIGHT: 207.9 LBS | HEIGHT: 63 IN | TEMPERATURE: 98.4 F | BODY MASS INDEX: 36.84 KG/M2 | SYSTOLIC BLOOD PRESSURE: 120 MMHG

## 2017-09-01 DIAGNOSIS — M54.50 ACUTE BILATERAL LOW BACK PAIN WITHOUT SCIATICA: ICD-10-CM

## 2017-09-01 DIAGNOSIS — E66.9 OBESITY (BMI 30-39.9): Chronic | ICD-10-CM

## 2017-09-01 DIAGNOSIS — E78.5 HYPERLIPIDEMIA LDL GOAL <100: Chronic | ICD-10-CM

## 2017-09-01 DIAGNOSIS — F41.8 DEPRESSION WITH ANXIETY: ICD-10-CM

## 2017-09-01 DIAGNOSIS — I10 ESSENTIAL HYPERTENSION: Chronic | ICD-10-CM

## 2017-09-01 PROCEDURE — 99214 OFFICE O/P EST MOD 30 MIN: CPT | Performed by: FAMILY MEDICINE

## 2017-09-01 ASSESSMENT — ENCOUNTER SYMPTOMS
BLOOD IN STOOL: 0
CONSTIPATION: 0
STRIDOR: 0
RECTAL PAIN: 0
APNEA: 0
SHORTNESS OF BREATH: 0
ABDOMINAL DISTENTION: 0
CHEST TIGHTNESS: 0
WHEEZING: 0
ANAL BLEEDING: 0
CHOKING: 0
DIARRHEA: 0
COUGH: 0
ABDOMINAL PAIN: 0
VOMITING: 0
NAUSEA: 0

## 2017-09-08 DIAGNOSIS — M54.50 ACUTE BILATERAL LOW BACK PAIN WITHOUT SCIATICA: ICD-10-CM

## 2017-09-08 RX ORDER — CYCLOBENZAPRINE HCL 5 MG
TABLET ORAL
Qty: 30 TABLET | Refills: 0 | Status: SHIPPED | OUTPATIENT
Start: 2017-09-08 | End: 2017-11-30 | Stop reason: SDUPTHER

## 2017-09-11 ENCOUNTER — TELEPHONE (OUTPATIENT)
Dept: FAMILY MEDICINE CLINIC | Age: 57
End: 2017-09-11

## 2017-09-17 PROBLEM — S82.891A CLOSED FRACTURE OF RIGHT ANKLE: Status: ACTIVE | Noted: 2017-09-17

## 2017-09-17 PROBLEM — N18.30 CKD (CHRONIC KIDNEY DISEASE), STAGE III (HCC): Status: ACTIVE | Noted: 2017-09-17

## 2017-09-22 ENCOUNTER — OFFICE VISIT (OUTPATIENT)
Dept: ORTHOPEDIC SURGERY | Age: 57
End: 2017-09-22

## 2017-09-22 VITALS
SYSTOLIC BLOOD PRESSURE: 103 MMHG | HEIGHT: 63 IN | WEIGHT: 207.01 LBS | BODY MASS INDEX: 36.68 KG/M2 | HEART RATE: 96 BPM | DIASTOLIC BLOOD PRESSURE: 71 MMHG

## 2017-09-22 DIAGNOSIS — M25.571 ACUTE RIGHT ANKLE PAIN: Primary | ICD-10-CM

## 2017-09-22 DIAGNOSIS — S82.891D CLOSED FRACTURE OF RIGHT ANKLE, WITH ROUTINE HEALING, SUBSEQUENT ENCOUNTER: ICD-10-CM

## 2017-09-22 PROCEDURE — L4360 PNEUMAT WALKING BOOT PRE CST: HCPCS | Performed by: ORTHOPAEDIC SURGERY

## 2017-09-22 PROCEDURE — 73610 X-RAY EXAM OF ANKLE: CPT | Performed by: ORTHOPAEDIC SURGERY

## 2017-09-22 PROCEDURE — 99024 POSTOP FOLLOW-UP VISIT: CPT | Performed by: ORTHOPAEDIC SURGERY

## 2017-10-11 DIAGNOSIS — E78.5 HYPERLIPIDEMIA LDL GOAL <100: ICD-10-CM

## 2017-10-11 RX ORDER — SIMVASTATIN 20 MG
TABLET ORAL
Qty: 30 TABLET | Refills: 2 | Status: SHIPPED | OUTPATIENT
Start: 2017-10-11 | End: 2017-12-19 | Stop reason: ALTCHOICE

## 2017-10-13 ENCOUNTER — OFFICE VISIT (OUTPATIENT)
Dept: ORTHOPEDIC SURGERY | Age: 57
End: 2017-10-13

## 2017-10-13 VITALS — BODY MASS INDEX: 36.68 KG/M2 | HEIGHT: 63 IN | WEIGHT: 207.01 LBS

## 2017-10-13 DIAGNOSIS — S82.891D CLOSED FRACTURE OF RIGHT ANKLE, WITH ROUTINE HEALING, SUBSEQUENT ENCOUNTER: ICD-10-CM

## 2017-10-13 DIAGNOSIS — M25.571 RIGHT ANKLE PAIN, UNSPECIFIED CHRONICITY: Primary | ICD-10-CM

## 2017-10-13 PROCEDURE — 73610 X-RAY EXAM OF ANKLE: CPT | Performed by: ORTHOPAEDIC SURGERY

## 2017-10-13 PROCEDURE — 99024 POSTOP FOLLOW-UP VISIT: CPT | Performed by: ORTHOPAEDIC SURGERY

## 2017-10-13 NOTE — PROGRESS NOTES
The patient presents today status post her 9/17/17 right ankle ORIF of a distal fibula fracture with a syndesmosis screw. History of present illness: The patient returns today for postop check. Pain control has been satisfactory with oral medications. There have been no fevers or chills. She is currently at an inpatient rehab facility. Physical examination: The incision is clean, dry, and intact with no drainage or signs of infection. There is expected swelling with no evidence of DVT and a negative Homans sign. Neurovascular exam is intact. Radiographs:   3 x-ray views of the right ankle demonstrates good position of the syndesmosis and the fracture. Consolidation is normal.    Assessment/plan:   I like the patient to begin working on range of motion of the foot and ankle at rest.  She needs to continue to utilize the boot with weightbearing, 50% weightbearing using boot and walker. Follow-up in 3-4 weeks with x-ray. I have personally performed and/or participated in the history, exam and medical decision making and agree with all pertinent clinical information. I have also reviewed and agree with the past medical, family and social history unless otherwise noted. This dictation was performed with a verbal recognition program (DRAGON) and it was checked for errors. It is possible that there are still dictated errors within this office note. If so, please bring any errors to my attention for an addendum. All efforts were made to ensure that this office note is accurate.           Luke Samuels MD

## 2017-10-24 ENCOUNTER — TELEPHONE (OUTPATIENT)
Dept: ORTHOPEDIC SURGERY | Age: 57
End: 2017-10-24

## 2017-11-03 ENCOUNTER — OFFICE VISIT (OUTPATIENT)
Dept: ORTHOPEDIC SURGERY | Age: 57
End: 2017-11-03

## 2017-11-03 VITALS — HEIGHT: 63 IN | WEIGHT: 207.01 LBS | BODY MASS INDEX: 36.68 KG/M2

## 2017-11-03 DIAGNOSIS — S82.891D CLOSED FRACTURE OF RIGHT ANKLE, WITH ROUTINE HEALING, SUBSEQUENT ENCOUNTER: ICD-10-CM

## 2017-11-03 DIAGNOSIS — M25.571 ACUTE RIGHT ANKLE PAIN: Primary | ICD-10-CM

## 2017-11-03 DIAGNOSIS — Z09 POSTOP CHECK: ICD-10-CM

## 2017-11-03 PROCEDURE — 99024 POSTOP FOLLOW-UP VISIT: CPT | Performed by: PHYSICIAN ASSISTANT

## 2017-11-14 ENCOUNTER — HOSPITAL ENCOUNTER (OUTPATIENT)
Dept: PHYSICAL THERAPY | Age: 57
Discharge: HOME | End: 2017-11-30
Admitting: ORTHOPAEDIC SURGERY

## 2017-11-14 NOTE — PLAN OF CARE
[facility]  Orthopaedics and Sports Rehabilitation, [place] [address] [facility], [address] [facility] [address]  Phone: [phone]   Fax:     [phone]       Physical Therapy Certification    Dear Referring Practitioner: Ed Ornelas PA-C,    We had the pleasure of evaluating the following patient for physical therapy services at [address] [place] [address]. A summary of our findings can be found in the initial assessment below. This includes our plan of care. If you have any questions or concerns regarding these findings, please do not hesitate to contact me at the office phone number checked above. Thank you for the referral.       Physician Signature:_______________________________Date:__________________  By signing above (or electronic signature), therapists plan is approved by physician      Patient: Willa Lassiter   : 1960   MRN: [id number]  Referring Physician: Referring Practitioner: Ed Ornelas PA-C      Evaluation Date: 2017      Medical Diagnosis Information:  Diagnosis: K52.339C (ICD-10-CM) - Closed fracture of right ankle, with routine healing, subsequent encounter   Treatment Diagnosis: R ankle pain, s/p R distal fibula fx with ORIF DOS 17                                         Insurance information: PT Insurance Information: [employer] $0CP 100% 30 PT     Precautions/ Contra-indications: Hx of cancer (remission), DM type II, HTN controlled  Latex Allergy:  [x]NO      []YES  Preferred Language for Healthcare:   [x]English       []other:    SUBJECTIVE: Patient stated complaint:Pt reports R ankle pain due to s/p R distal fibula fx with ORIF DOS 17.      Relevant Medical History:  Functional Disability Index:PT G-Codes  Functional Assessment Tool Used: LEFS  Score: 78%  Functional Limitation: Mobility: Walking and moving around  Mobility: Walking and Moving Around Current Status (): At least 60 percent but less than 80 percent impaired, limited or restricted  Mobility: Walking and Moving Around Goal Status (): At least 20 percent but less than 40 percent impaired, limited or restricted    Pain Scale: 5/10  Easing factors: ice, rest  Provocative factors: walking     Type: []Constant   [x]Intermittent  []Radiating []Localized []other:     Numbness/Tingling: none    Occupation/School:    Living Status/Prior Level of Function: Independent with ADLs and IADLs, lives alone 5 stairs to enter home with railing. OBJECTIVE:     ROM LEFT RIGHT   HIP Flex     HIP Abd     HIP Ext     HIP IR     HIP ER     Knee ext     Knee Flex     Ankle PF WFL all planes 2 deg   Ankle DF  24 deg   Ankle In  8 deg   Ankle Ev  10 deg   Strength  LEFT RIGHT   HIP Flexors     HIP Abductors     HIP Ext     Hip ER     Knee EXT (quad)     Knee Flex (HS)     Ankle DF 4+/5 all planes 3+/5 all planes   Ankle PF     Ankle Inv     Ankle EV          Circumference  Mid apex  7 cm prox             Reflexes/Sensation:    [x]Dermatomes/Myotomes intact    [x]Reflexes equal and normal bilaterally   []Other:    Joint mobility: TC   []Normal    [x]Hypo   []Hyper    Palpation: TTP over incision     Functional Mobility/Transfers: antalgic     Posture:     Bandages/Dressings/Incisions: incision healing as expected with no SOI. Gait: (include devices/WB status) antalgic gait pattern with R lateral lean in a walking boot. Orthopedic Special Tests:                        [x] Patient history, allergies, meds reviewed. Medical chart reviewed. See intake form. Review Of Systems (ROS):  [x]Performed Review of systems (Integumentary, CardioPulmonary, Neurological) by intake and observation. Intake form has been scanned into medical record. Patient has been instructed to contact their primary care physician regarding ROS issues if not already being addressed at this time.       Co-morbidities/Complexities (which will affect course of rehabilitation):   []None           Arthritic conditions   []Rheumatoid arthritis (M05.9)  [x]Osteoarthritis (M19.91)   Cardiovascular conditions   [x]Hypertension (I10)  []Hyperlipidemia (E78.5)  []Angina pectoris (I20)  []Atherosclerosis (I70)   Musculoskeletal conditions   []Disc pathology   []Congenital spine pathologies   []Prior surgical intervention  []Osteoporosis (M81.8)  []Osteopenia (M85.8)   Endocrine conditions   []Hypothyroid (E03.9)  []Hyperthyroid Gastrointestinal conditions   []Constipation (C87.12)   Metabolic conditions   []Morbid obesity (E66.01)  [x]Diabetes type 1(E10.65) or 2 (E11.65)   []Neuropathy (G60.9)     Pulmonary conditions   []Asthma (J45)  []Coughing   []COPD (J44.9)   Psychological Disorders  []Anxiety (F41.9)  []Depression (F32.9)   []Other:   []Other:          Barriers to/and or personal factors that will affect rehab potential:              []Age  []Sex              []Motivation/Lack of Motivation                        []Co-Morbidities              []Cognitive Function, education/learning barriers              [x]Environmental, home barriers              []profession/work barriers  []past PT/medical experience  []other:  Justification:     Falls Risk Assessment (30 days):   [x] Falls Risk assessed and no intervention required. [] Falls Risk assessed and Patient requires intervention due to being higher risk   TUG score (>12s at risk):     [] Falls education provided, including       G-Codes:  PT G-Codes  Functional Assessment Tool Used: LEFS  Score: 78%  Functional Limitation: Mobility: Walking and moving around  Mobility: Walking and Moving Around Current Status (): At least 60 percent but less than 80 percent impaired, limited or restricted  Mobility: Walking and Moving Around Goal Status ():  At least 20 percent but less than 40 percent impaired, limited or restricted    ASSESSMENT:   Functional Impairments:     [x]Noted lumbar/proximal hip/LE joint hypomobility   [x]Decreased LE functional ROM   [x]Decreased core/proximal hip strength and neuromuscular control   [x]Decreased LE functional strength   [x]Reduced balance/proprioceptive control   []other:      Functional Activity Limitations (from functional questionnaire and intake)   [x]Reduced ability to tolerate prolonged functional positions   [x]Reduced ability or difficulty with changes of positions or transfers between positions   [x]Reduced ability to maintain good posture and demonstrate good body mechanics with sitting, bending, and lifting   []Reduced ability to sleep   [x] Reduced ability or tolerance with driving and/or computer work   [x]Reduced ability to perform lifting, carrying tasks   [x]Reduced ability to squat   [x]Reduced ability to forward bend   [x]Reduced ability to ambulate prolonged functional periods/distances/surfaces   [x]Reduced ability to ascend/descend stairs   [x]Reduced ability to run, hop, cut or jump   []other:    Participation Restrictions   [x]Reduced participation in self care activities   [x]Reduced participation in home management activities   []Reduced participation in work activities   [x]Reduced participation in social activities. [x]Reduced participation in sport/recreation activities. Classification :    [x]Signs/symptoms consistent with post-surgical status including decreased ROM, strength and function.    []Signs/symptoms consistent with joint sprain/strain   []Signs/symptoms consistent with patella-femoral syndrome   []Signs/symptoms consistent with knee OA/hip OA   []Signs/symptoms consistent with internal derangement of knee/Hip   []Signs/symptoms consistent with functional hip weakness/NMR control      []Signs/symptoms consistent with tendinitis/tendinosis    []signs/symptoms consistent with pathology which may benefit from Dry needling      []other:      Prognosis/Rehab Potential:      []Excellent   [x]Good    []Fair   []Poor    Tolerance of evaluation/treatment:    []Excellent   [x]Good    []Fair   []Poor    Physical Therapy Evaluation Complexity Justification  [x] A history of present problem with:  [] no personal factors and/or comorbidities that impact the plan of care;  []1-2 personal factors and/or comorbidities that impact the plan of care  [x]3 personal factors and/or comorbidities that impact the plan of care  [x] An examination of body systems using standardized tests and measures addressing any of the following: body structures and functions (impairments), activity limitations, and/or participation restrictions;:  [] a total of 1-2 or more elements   [x] a total of 3 or more elements   [] a total of 4 or more elements   [x] A clinical presentation with:  [x] stable and/or uncomplicated characteristics   [] evolving clinical presentation with changing characteristics  [] unstable and unpredictable characteristics;   [x] Clinical decision making of [x] low, [] moderate, [] high complexity using standardized patient assessment instrument and/or measurable assessment of functional outcome. [x] EVAL (LOW) 94168 (typically 20 minutes face-to-face)  [] EVAL (MOD) 91794 (typically 30 minutes face-to-face)  [] EVAL (HIGH) 02850 (typically 45 minutes face-to-face)  [] RE-EVAL     PLAN:   Frequency/Duration:  2 days per week for 12 Weeks:  Interventions:  [x]  Therapeutic exercise including: strength training, ROM, for Lower extremity and core   [x]  NMR activation and proprioception for LE, Glutes and Core   [x]  Manual therapy as indicated for LE, Hip and spine to include: Dry Needling/IASTM, STM, PROM, Gr I-IV mobilizations, manipulation. [x] Modalities as needed that may include: thermal agents, E-stim, Biofeedback, US, iontophoresis as indicated  [x] Patient education on joint protection, postural re-education, activity modification, progression of HEP.     HEP instruction: (see scanned forms)    GOALS:  Patient stated goal: Pt would like to

## 2017-11-14 NOTE — FLOWSHEET NOTE
swelling/inflammation/restriction, improving soft tissue extensibility and allowing for proper ROM for normal function with self care, mobility, lifting and ambulation. Modalities:      Charges:  Timed Code Treatment Minutes: 38   Total Treatment Minutes: 50     [x] EVAL (LOW) 47318 (typically 20 minutes face-to-face)    [x] EVAL  [x] YK(52401) x  1   [] IONTO  [x] NMR (19086) x  1   [] VASO  [x] Manual (71880) x  1    [] Other:  [] TA x       [] Mech Traction (00795)  [] ES(attended) (97318)      [] ES (un) (68393):     GOALS: Patient stated goal: Pt would like to return to pain free ambulation.      Therapist goals for Patient:   Short Term Goals: To be achieved in: 2 weeks  1. Independent in HEP and progression per patient tolerance, in order to prevent re-injury. 2. Patient will have a decrease in pain to facilitate improvement in movement, function, and ADLs as indicated by Functional Deficits.     Long Term Goals: To be achieved in: 12 weeks  1. Disability index score of 35% or less for the LEFS to assist with reaching prior level of function. 2. Patient will demonstrate increased AROM to R ankle = to L ankle to allow for proper joint functioning as indicated by patients Functional Deficits. 3. Patient will demonstrate an increase in Strength to good proximal hip strength and control, within 5lb HHD in LE to allow for proper functional mobility as indicated by patients Functional Deficits. 4. Patient will return to a;; functional activities without increased symptoms or restriction. 5. Pt will demonstrate a normalized gait pattern with 0/10 pain and and no AD. Progression Towards Functional goals:  [] Patient is progressing as expected towards functional goals listed. [] Progression is slowed due to complexities listed. [] Progression has been slowed due to co-morbidities.   [x] Plan just implemented, too soon to assess goals progression  [] Other:     ASSESSMENT:  See eval    Treatment/Activity Tolerance:  [x] Patient tolerated treatment well [] Patient limited by fatique  [] Patient limited by pain  [] Patient limited by other medical complications  [] Other:     Prognosis: [x] Good [] Fair  [] Poor    Patient Requires Follow-up: [x] Yes  [] No    PLAN: See eval  [] Continue per plan of care [] Alter current plan (see comments)  [x] Plan of care initiated [] Hold pending MD visit [] Discharge    Electronically signed by: Steve Houston PT,DPT

## 2017-11-17 ENCOUNTER — HOSPITAL ENCOUNTER (OUTPATIENT)
Dept: PHYSICAL THERAPY | Age: 57
Discharge: HOME | End: 2017-11-17
Admitting: ORTHOPAEDIC SURGERY

## 2017-11-17 NOTE — FLOWSHEET NOTE
verbal/tactile cueing for activities related to improving balance, coordination, kinesthetic sense, posture, motor skill, proprioception  to assist with LE, proximal hip, and core control in self care, mobility, lifting, ambulation and eccentric single leg control. NMR and Therapeutic Activities:    [x] (19258 or 98809) Provided verbal/tactile cueing for activities related to improving balance, coordination, kinesthetic sense, posture, motor skill, proprioception and motor activation to allow for proper function of core, proximal hip and LE with self care and ADLs  [] (88990) Gait Re-education- Provided training and instruction to the patient for proper LE, core and proximal hip recruitment and positioning and eccentric body weight control with ambulation re-education including up and down stairs     Home Exercise Program:    [x] (09371) Reviewed/Progressed HEP activities related to strengthening, flexibility, endurance, ROM of core, proximal hip and LE for functional self-care, mobility, lifting and ambulation/stair navigation   [] (83397)Reviewed/Progressed HEP activities related to improving balance, coordination, kinesthetic sense, posture, motor skill, proprioception of core, proximal hip and LE for self care, mobility, lifting, and ambulation/stair navigation      Manual Treatments:  PROM / STM / Oscillations-Mobs:  G-I, II, III, IV (PA's, Inf., Post.)  [x] (62783) Provided manual therapy to mobilize LE, proximal hip and/or LS spine soft tissue/joints for the purpose of modulating pain, promoting relaxation,  increasing ROM, reducing/eliminating soft tissue swelling/inflammation/restriction, improving soft tissue extensibility and allowing for proper ROM for normal function with self care, mobility, lifting and ambulation.      Modalities:  CPx10'    Charges:  Timed Code Treatment Minutes: 38   Total Treatment Minutes: 48     [] EVAL (LOW) 43267 (typically 20 minutes face-to-face)    [] EVAL  [x] OO(79442) comments)  [x] Plan of care initiated [] Hold pending MD visit [] Discharge    Electronically signed by: Ebony Veras PT,DPT

## 2017-11-28 ENCOUNTER — HOSPITAL ENCOUNTER (OUTPATIENT)
Dept: PHYSICAL THERAPY | Age: 57
Discharge: HOME | End: 2017-11-28
Admitting: ORTHOPAEDIC SURGERY

## 2017-11-30 DIAGNOSIS — M54.50 ACUTE BILATERAL LOW BACK PAIN WITHOUT SCIATICA: ICD-10-CM

## 2017-11-30 RX ORDER — CYCLOBENZAPRINE HCL 5 MG
TABLET ORAL
Qty: 30 TABLET | Refills: 0 | Status: SHIPPED | OUTPATIENT
Start: 2017-11-30 | End: 2019-04-30

## 2017-12-01 ENCOUNTER — HOSPITAL ENCOUNTER (OUTPATIENT)
Dept: PHYSICAL THERAPY | Age: 57
Discharge: HOME | End: 2017-12-31
Attending: ORTHOPAEDIC SURGERY | Admitting: ORTHOPAEDIC SURGERY

## 2017-12-01 ENCOUNTER — HOSPITAL ENCOUNTER (OUTPATIENT)
Dept: PHYSICAL THERAPY | Age: 57
Discharge: HOME | End: 2017-12-01
Admitting: ORTHOPAEDIC SURGERY

## 2017-12-01 NOTE — FLOWSHEET NOTE
Replaced by [facility]  Orthopaedics and Sports Rehabilitation, [facility]    Physical Therapy Daily Treatment Note  Date:  2017    Patient Name:  Adebayo Perales    :  1960  MRN: [id number]  Restrictions/Precautions:    Medical/Treatment Diagnosis Information:  · Diagnosis: S82.891D (ICD-10-CM) - Closed fracture of right ankle, with routine healing, subsequent encounter  · Treatment Diagnosis: R ankle pain, s/p R distal fibula fx with ORIF DOS   Insurance/Certification information:  PT Insurance Information: [employer] $0CP 100% 30 PT  Physician Information:  Referring Practitioner: Holly Vail PA-C   Plan of care signed (Y/N):     Date of Patient follow up with Physician:     G-Code (if applicable):      Date G-Code Applied:         Progress Note: [x]  Yes  []  No  Next due by: Visit #10       Latex Allergy:  [x]NO      []YES  Preferred Language for Healthcare:   [x]English       []other:    Visit # Insurance Allowable   4 30     Pain level:  5/10      SUBJECTIVE:  Pt reports her foot is feeling ok. OBJECTIVE:   Observation:   Test measurements:      RESTRICTIONS/PRECAUTIONS: WBAT, wean from boot, type II DM, HTN    Exercises/Interventions:     Therapeutic Ex Sets/sec Reps Notes HEP   Ankle circles 2 10     Toe scrunch/spread 2 10     Ankle TB PF/DF 2 15 GTB    Seated HR/TR 2 10     Standing WS fwd/lat 2 10     gastroc S belt 20'' 3     Standing HR 3 10     Gait training 5'      Step ups 4'' 2 10     SLS  5'' 10                                               Manual Intervention       PROM, TC jt mobs grade III-IV 8'                                         NMR re-education                                                                          Therapeutic Exercise and NMR EXR  [x] (95428) Provided verbal/tactile cueing for activities related to strengthening, flexibility, endurance, ROM for improvements in LE, proximal hip, and core control with self care, mobility, lifting, ambulation.   [] EVAL  [x] VL(68269) x  1   [] IONTO  [x] NMR (81790) x  1   [] VASO  [x] Manual (41054) x  1    [] Other:  [] TA x       [] Mech Traction (53567)  [] ES(attended) (69445)      [] ES (un) (65566):     GOALS: Patient stated goal: Pt would like to return to pain free ambulation.      Therapist goals for Patient:   Short Term Goals: To be achieved in: 2 weeks  1. Independent in HEP and progression per patient tolerance, in order to prevent re-injury. 2. Patient will have a decrease in pain to facilitate improvement in movement, function, and ADLs as indicated by Functional Deficits.     Long Term Goals: To be achieved in: 12 weeks  1. Disability index score of 35% or less for the LEFS to assist with reaching prior level of function. 2. Patient will demonstrate increased AROM to R ankle = to L ankle to allow for proper joint functioning as indicated by patients Functional Deficits. 3. Patient will demonstrate an increase in Strength to good proximal hip strength and control, within 5lb HHD in LE to allow for proper functional mobility as indicated by patients Functional Deficits. 4. Patient will return to a;; functional activities without increased symptoms or restriction. 5. Pt will demonstrate a normalized gait pattern with 0/10 pain and and no AD. Progression Towards Functional goals:  [] Patient is progressing as expected towards functional goals listed. [] Progression is slowed due to complexities listed. [] Progression has been slowed due to co-morbidities.   [x] Plan just implemented, too soon to assess goals progression  [] Other:     ASSESSMENT:  See eval    Treatment/Activity Tolerance:  [x] Patient tolerated treatment well [] Patient limited by fatique  [] Patient limited by pain  [] Patient limited by other medical complications  [] Other:     Prognosis: [x] Good [] Fair  [] Poor    Patient Requires Follow-up: [x] Yes  [] No    PLAN: See eval  [] Continue per plan of care [] Alter current

## 2017-12-13 ENCOUNTER — HOSPITAL ENCOUNTER (OUTPATIENT)
Dept: PHYSICAL THERAPY | Age: 57
Discharge: HOME | End: 2017-12-13
Admitting: ORTHOPAEDIC SURGERY

## 2017-12-14 ENCOUNTER — HOSPITAL ENCOUNTER (OUTPATIENT)
Dept: PHYSICAL THERAPY | Age: 57
Discharge: HOME | End: 2017-12-14
Admitting: ORTHOPAEDIC SURGERY

## 2017-12-14 NOTE — FLOWSHEET NOTE
[facility]  Orthopaedics and Sports Rehabilitation, [facility]    Physical Therapy Daily Treatment Note  Date:  2017    Patient Name:  Adebayo Perales    :  1960  MRN: [id number]  Restrictions/Precautions:    Medical/Treatment Diagnosis Information:  · Diagnosis: S82.891D (ICD-10-CM) - Closed fracture of right ankle, with routine healing, subsequent encounter  · Treatment Diagnosis: R ankle pain, s/p R distal fibula fx with ORIF DOS   Insurance/Certification information:  PT Insurance Information: [employer] $0CP 100% 30 PT  Physician Information:  Referring Practitioner: Holly Vail PA-C   Plan of care signed (Y/N):     Date of Patient follow up with Physician:     G-Code (if applicable):      Date G-Code Applied:         Progress Note: [x]  Yes  []  No  Next due by: Visit #10       Latex Allergy:  [x]NO      []YES  Preferred Language for Healthcare:   [x]English       []other:    Visit # Insurance Allowable   5 30     Pain level:  0/10      SUBJECTIVE:  Pt reports no pain at this time and is ambulating in a shoe.       OBJECTIVE:   Observation:   Test measurements:      RESTRICTIONS/PRECAUTIONS: WBAT, wean from boot, type II DM, HTN    Exercises/Interventions:     Therapeutic Ex Sets/sec Reps Notes HEP   Ankle circles 2 10     Toe scrunch/spread 2 10     Ankle TB PF/DF 2 15 GTB    Seated HR/TR 2 10     Standing WS fwd/lat 2 10     gastroc S belt 20'' 3     Standing HR/TR 3 10     Gait training 5'      Step ups 4'' 2 10     SLS  5'' 10     bike 5'      Standing marches 2 10     Standing SLR abd 2 10     Baps board 1 10 ??, ??, CW/CCW                    Manual Intervention       PROM, TC jt mobs grade III-IV 8'                                         NMR re-education                                                                          Therapeutic Exercise and NMR EXR  [x] (35472) Provided verbal/tactile cueing for activities related to strengthening, flexibility, endurance, ROM for Requires Follow-up: [x] Yes  [] No    PLAN: See eval  [] Continue per plan of care [] Alter current plan (see comments)  [x] Plan of care initiated [] Hold pending MD visit [] Discharge    Electronically signed by: Alyssa Olsen PT,DPT

## 2017-12-18 DIAGNOSIS — R80.9 MICROALBUMINURIA: ICD-10-CM

## 2017-12-18 DIAGNOSIS — I10 ESSENTIAL HYPERTENSION: ICD-10-CM

## 2017-12-18 RX ORDER — LISINOPRIL 10 MG/1
TABLET ORAL
Qty: 30 TABLET | Refills: 1 | Status: SHIPPED | OUTPATIENT
Start: 2017-12-18 | End: 2018-02-27 | Stop reason: SDUPTHER

## 2017-12-19 ENCOUNTER — OFFICE VISIT (OUTPATIENT)
Dept: ENDOCRINOLOGY | Age: 57
End: 2017-12-19

## 2017-12-19 VITALS
DIASTOLIC BLOOD PRESSURE: 67 MMHG | WEIGHT: 210 LBS | HEART RATE: 100 BPM | SYSTOLIC BLOOD PRESSURE: 140 MMHG | OXYGEN SATURATION: 90 % | HEIGHT: 63 IN | BODY MASS INDEX: 37.21 KG/M2

## 2017-12-19 DIAGNOSIS — E11.69 DYSLIPIDEMIA ASSOCIATED WITH TYPE 2 DIABETES MELLITUS (HCC): ICD-10-CM

## 2017-12-19 DIAGNOSIS — E78.5 DYSLIPIDEMIA ASSOCIATED WITH TYPE 2 DIABETES MELLITUS (HCC): ICD-10-CM

## 2017-12-19 DIAGNOSIS — I10 ESSENTIAL HYPERTENSION: ICD-10-CM

## 2017-12-19 DIAGNOSIS — E11.59 TYPE 2 DIABETES MELLITUS WITH VASCULAR DISEASE (HCC): ICD-10-CM

## 2017-12-19 PROCEDURE — 99244 OFF/OP CNSLTJ NEW/EST MOD 40: CPT | Performed by: INTERNAL MEDICINE

## 2017-12-19 RX ORDER — INSULIN GLARGINE 100 [IU]/ML
INJECTION, SOLUTION SUBCUTANEOUS
COMMUNITY
Start: 2017-12-11 | End: 2017-12-19 | Stop reason: SDUPTHER

## 2017-12-19 RX ORDER — CEFUROXIME AXETIL 250 MG/1
250 TABLET ORAL 2 TIMES DAILY
COMMUNITY
End: 2018-02-16 | Stop reason: ALTCHOICE

## 2017-12-19 RX ORDER — GABAPENTIN 600 MG/1
600 TABLET ORAL 3 TIMES DAILY
Status: ON HOLD | COMMUNITY
End: 2021-06-04 | Stop reason: SDUPTHER

## 2017-12-19 ASSESSMENT — PATIENT HEALTH QUESTIONNAIRE - PHQ9
SUM OF ALL RESPONSES TO PHQ9 QUESTIONS 1 & 2: 0
SUM OF ALL RESPONSES TO PHQ QUESTIONS 1-9: 0
1. LITTLE INTEREST OR PLEASURE IN DOING THINGS: 0
2. FEELING DOWN, DEPRESSED OR HOPELESS: 0

## 2017-12-19 NOTE — PROGRESS NOTES
weekly, Disp: 2 mL, Rfl: 5    Dulaglutide (TRULICITY) 8.50 DG/3.6MC SOPN, Use as directed, Disp: 1 pen, Rfl: 0    Dulaglutide (TRULICITY) 1.5 UA/5.1JR SOPN, Use as directed, Disp: 1 pen, Rfl: 0    lisinopril (PRINIVIL;ZESTRIL) 10 MG tablet, TAKE 1 TABLET BY MOUTH DAILY, Disp: 30 tablet, Rfl: 1    oxyCODONE-acetaminophen (PERCOCET) 5-325 MG per tablet, Take 1 tablet by mouth every 4 hours as needed for Pain ., Disp: , Rfl:     cyclobenzaprine (FLEXERIL) 5 MG tablet, TAKE 1 TABLET BY MOUTH EVERY 8 HOURS AS NEEDED FOR MUSCLE SPASMS, Disp: 30 tablet, Rfl: 0    diclofenac (VOLTAREN) 50 MG EC tablet, TAKE 1 TABLET BY MOUTH 3 TIMES DAILY (WITH MEALS), Disp: 30 tablet, Rfl: 0    metFORMIN (GLUCOPHAGE) 1000 MG tablet, TAKE 1 TABLET WITH MEALS TWICE A DAY, Disp: 60 tablet, Rfl: 2    insulin aspart (NOVOLOG FLEXPEN) 100 UNIT/ML injection pen, Inject 30 Units into the skin 3 times daily (before meals), Disp: 5 Pen, Rfl: 1    TRUE METRIX BLOOD GLUCOSE TEST strip, USE AS DIRECTED TO TEST 4 TIMES A DAY, Disp: 100 strip, Rfl: 3    B-D UF III MINI PEN NEEDLES 31G X 5 MM MISC, USE TO INJECT INSULIN 3-4 TIMES A DAY, Disp: 100 each, Rfl: 11    Insulin Pen Needle 31G X 5 MM MISC, 1 each by Does not apply route daily, Disp: 100 each, Rfl: 3    aspirin 325 MG EC tablet, Take 1 tablet by mouth daily, Disp: 30 tablet, Rfl: 3    traZODone (DESYREL) 100 MG tablet, Take 100 mg by mouth nightly, Disp: , Rfl:     glucagon 1 MG injection, Inject 1 mg into the skin See Admin Instructions Follow package directions for low blood sugar., Disp: 1 kit, Rfl: 0    letrozole (FEMARA) 2.5 MG tablet, Take 2.5 mg by mouth daily, Disp: , Rfl:     omeprazole (PRILOSEC) 20 MG capsule, Take 1 capsule by mouth daily, Disp: 30 capsule, Rfl: 0    paliperidone (INVEGA) 3 MG ER tablet, Take 9 mg by mouth every morning., Disp: , Rfl:       Review of Systems:    Constitutional: Negative for fever, chills, and unexpected weight change.    HENT: Negative for congestion, ear pain, rhinorrhea,  sore throat and trouble swallowing. Eyes: Negative for photophobia, redness, itching. Respiratory: Negative for cough, shortness of breath and sputum. Cardiovascular: Negative for chest pain, palpitations and leg swelling. Gastrointestinal: Negative for nausea, vomiting, abdominal pain, diarrhea, constipation. Endocrine: Negative for cold intolerance, heat intolerance, polydipsia, polyphagia and polyuria. Genitourinary: Negative for dysuria, urgency, frequency, hematuria and flank pain. Musculoskeletal: Negative for myalgias, back pain, arthralgias and neck pain. Skin/Nail: Negative for rash, itching. Normal nails. Neurological: Negative for seizures, weakness, light-headedness, numbness and headaches. Hematological/ Lymph nodes: Negative for adenopathy. Does not bruise/bleed easily. Psychiatric/Behavioral: Negative for suicidal ideas, depression, anxiety, sleep disturbance and decreased concentration. Objective:   Physical Exam:  BP (!) 140/67 (Site: Left Arm, Position: Sitting, Cuff Size: Large Adult)   Pulse 100   Ht 5' 3\" (1.6 m)   Wt 210 lb (95.3 kg)   SpO2 90%   Breastfeeding? No   BMI 37.20 kg/m²   Constitutional: Patient is oriented to person, place, and time. Patient appears well-developed and well-nourished. HENT:    Head: Normocephalic and atraumatic. Eyes: Conjunctivae and EOM are normal. Pupils are equal, round, and reactive to light. Neck: Normal range of motion. Cardiovascular: Normal rate, regular rhythm and normal heart sounds. Pulmonary/Chest: Effort normal and breath sounds normal.   Abdominal: Soft. Bowel sounds are normal.   Musculoskeletal: Normal range of motion. Neurological: Patient is alert and oriented to person, place, and time. Patient has normal reflexes. Skin: Skin is warm and dry. Psychiatric: Patient has a normal mood and affect.  Patient behavior is normal.     Lab Review:    Admission on 09/17/2017 4.5  1.7 - 7.7 K/uL Final    Lymphocytes # 09/17/2017 2.8  1.0 - 5.1 K/uL Final    Monocytes # 09/17/2017 0.4  0.0 - 1.3 K/uL Final    Eosinophils # 09/17/2017 0.1  0.0 - 0.6 K/uL Final    Basophils # 09/17/2017 0.0  0.0 - 0.2 K/uL Final    Sodium 09/17/2017 137  136 - 145 mmol/L Final    Potassium 09/17/2017 4.6  3.5 - 5.1 mmol/L Final    Chloride 09/17/2017 98* 99 - 110 mmol/L Final    CO2 09/17/2017 24  21 - 32 mmol/L Final    Anion Gap 09/17/2017 15  3 - 16 Final    Glucose 09/17/2017 324* 70 - 99 mg/dL Final    BUN 09/17/2017 33* 7 - 20 mg/dL Final    CREATININE 09/17/2017 1.4* 0.6 - 1.1 mg/dL Final    GFR Non- 09/17/2017 39* >60 Final    GFR  09/17/2017 47* >60 Final    Calcium 09/17/2017 9.8  8.3 - 10.6 mg/dL Final    Total Protein 09/17/2017 7.8  6.4 - 8.2 g/dL Final    Alb 09/17/2017 4.0  3.4 - 5.0 g/dL Final    Albumin/Globulin Ratio 09/17/2017 1.1  1.1 - 2.2 Final    Total Bilirubin 09/17/2017 <0.2  0.0 - 1.0 mg/dL Final    Alkaline Phosphatase 09/17/2017 102  40 - 129 U/L Final    ALT 09/17/2017 17  10 - 40 U/L Final    AST 09/17/2017 16  15 - 37 U/L Final    Globulin 09/17/2017 3.8  g/dL Final    Specimen Status 09/17/2017 KIMMY   Final    Ventricular Rate 09/22/2017 57  BPM Final    Atrial Rate 09/22/2017 57  BPM Final    P-R Interval 09/22/2017 158  ms Final    QRS Duration 09/22/2017 72  ms Final    Q-T Interval 09/22/2017 428  ms Final    QTc Calculation (Bazett) 09/22/2017 416  ms Final    P Axis 09/22/2017 23  degrees Final    R Axis 09/22/2017 -36  degrees Final    T Axis 09/22/2017 34  degrees Final    Diagnosis 09/22/2017    Final                    Value:Sinus bradycardia  Left axis deviation  Abnormal ECG  When compared with ECG of 05-JUL-2017 15:21,  No significant change was found  Confirmed by [facility] KAREEM ZHU MD ([id number]) on 9/17/2017 3:17:06 PM      Hemoglobin A1C 09/17/2017 11.7  See comment % Final    eAG 09/17/2017 289.1  mg/dL Final    POC Glucose 09/17/2017 306* 70 - 99 mg/dl Final    Performed on 09/17/2017 ACCU-CHEK   Final    POC Glucose 09/17/2017 218* 70 - 99 mg/dl Final    Performed on 09/17/2017 ACCU-CHEK   Final    POC Glucose 09/17/2017 234* 70 - 99 mg/dl Final    Performed on 09/17/2017 ACCU-CHEK   Final    Sodium 09/18/2017 138  136 - 145 mmol/L Final    Potassium 09/18/2017 5.0  3.5 - 5.1 mmol/L Final    Chloride 09/18/2017 101  99 - 110 mmol/L Final    CO2 09/18/2017 22  21 - 32 mmol/L Final    Anion Gap 09/18/2017 15  3 - 16 Final    Glucose 09/18/2017 324* 70 - 99 mg/dL Final    BUN 09/18/2017 23* 7 - 20 mg/dL Final    CREATININE 09/18/2017 1.0  0.6 - 1.1 mg/dL Final    GFR Non- 09/18/2017 57* >60 Final    GFR  09/18/2017 >60  >60 Final    Calcium 09/18/2017 9.1  8.3 - 10.6 mg/dL Final    WBC 09/18/2017 8.3  4.0 - 11.0 K/uL Final    RBC 09/18/2017 3.95* 4.00 - 5.20 M/uL Final    Hemoglobin 09/18/2017 10.7* 12.0 - 16.0 g/dL Final    Hematocrit 09/18/2017 33.1* 36.0 - 48.0 % Final    MCV 09/18/2017 83.9  80.0 - 100.0 fL Final    MCH 09/18/2017 27.1  26.0 - 34.0 pg Final    MCHC 09/18/2017 32.4  31.0 - 36.0 g/dL Final    RDW 09/18/2017 14.3  12.4 - 15.4 % Final    Platelets 90/34/5902 160  135 - 450 K/uL Final    MPV 09/18/2017 9.7  5.0 - 10.5 fL Final    POC Glucose 09/17/2017 311* 70 - 99 mg/dl Final    Performed on 09/17/2017 ACCU-CHEK   Final    POC Glucose 09/18/2017 277* 70 - 99 mg/dl Final    Performed on 09/18/2017 ACCU-CHEK   Final    POC Glucose 09/18/2017 453* 70 - 99 mg/dl Final    Performed on 09/18/2017 ACCU-CHEK   Final    POC Glucose 09/18/2017 368* 70 - 99 mg/dl Final    Performed on 09/18/2017 ACCU-CHEK   Final    POC Glucose 09/18/2017 339* 70 - 99 mg/dl Final    Performed on 09/18/2017 ACCU-CHEK   Final    POC Glucose 09/18/2017 285* 70 - 99 mg/dl Final    Performed on 09/18/2017 ACCU-CHEK   Final    POC Glucose 09/19/2017 153* 70 - 99 mg/dl Final    Performed on 09/19/2017 ACCU-CHEK   Final    POC Glucose 09/19/2017 257* 70 - 99 mg/dl Final    Performed on 09/19/2017 ACCU-CHEK   Final    POC Glucose 09/19/2017 252* 70 - 99 mg/dl Final    Performed on 09/19/2017 ACCU-CHEK   Final    POC Glucose 09/19/2017 224* 70 - 99 mg/dl Final    Performed on 09/19/2017 ACCU-CHEK   Final    POC Glucose 09/20/2017 267* 70 - 99 mg/dl Final    Performed on 09/20/2017 ACCU-CHEK   Final    POC Glucose 09/20/2017 274* 70 - 99 mg/dl Final    Performed on 09/20/2017 ACCU-CHEK   Final    POC Glucose 09/20/2017 376* 70 - 99 mg/dl Final    Performed on 09/20/2017 ACCU-CHEK   Final    POC Glucose 09/20/2017 237* 70 - 99 mg/dl Final    Performed on 09/20/2017 ACCU-CHEK   Final    Sodium 09/21/2017 140  136 - 145 mmol/L Final    Potassium 09/21/2017 4.4  3.5 - 5.1 mmol/L Final    Chloride 09/21/2017 102  99 - 110 mmol/L Final    CO2 09/21/2017 24  21 - 32 mmol/L Final    Anion Gap 09/21/2017 14  3 - 16 Final    Glucose 09/21/2017 212* 70 - 99 mg/dL Final    BUN 09/21/2017 21* 7 - 20 mg/dL Final    CREATININE 09/21/2017 0.9  0.6 - 1.1 mg/dL Final    GFR Non- 09/21/2017 >60  >60 Final    GFR  09/21/2017 >60  >60 Final    Calcium 09/21/2017 9.1  8.3 - 10.6 mg/dL Final    POC Glucose 09/20/2017 203* 70 - 99 mg/dl Final    Performed on 09/20/2017 ACCU-CHEK   Final    POC Glucose 09/21/2017 195* 70 - 99 mg/dl Final    Performed on 09/21/2017 ACCU-CHEK   Final    POC Glucose 09/21/2017 317* 70 - 99 mg/dl Final    Performed on 09/21/2017 ACCU-CHEK   Final    POC Glucose 09/21/2017 260* 70 - 99 mg/dl Final    Performed on 09/21/2017 ACCU-CHEK   Final   Admission on 07/05/2017, Discharged on 07/08/2017   No results displayed because visit has over 200 results.       Orders Only on 06/01/2017   Component Date Value Ref Range Status    Cholesterol, Total 06/01/2017 123  0 - 199 mg/dL Final    4.3  3.4 - 5.0 g/dL Final    Albumin/Globulin Ratio 03/02/2017 1.1  1.1 - 2.2 Final    Total Bilirubin 03/02/2017 0.3  0.0 - 1.0 mg/dL Final    Alkaline Phosphatase 03/02/2017 119  40 - 129 U/L Final    ALT 03/02/2017 26  10 - 40 U/L Final    AST 03/02/2017 21  15 - 37 U/L Final    Globulin 03/02/2017 3.8  g/dL Final    Cholesterol, Total 03/02/2017 143  0 - 199 mg/dL Final    Triglycerides 03/02/2017 93  0 - 150 mg/dL Final    HDL 03/02/2017 67* 40 - 60 mg/dL Final    LDL Calculated 03/02/2017 57  <100 mg/dL Final    VLDL CHOLESTEROL CALCULATED 03/02/2017 19  Not Established mg/dL Final           Assessment and Plan     Jose Archuleta was seen today for diabetes. Diagnoses and all orders for this visit:    Uncontrolled type 2 diabetes mellitus with microalbuminuria, with long-term current use of insulin (HCC)  -     Dulaglutide (TRULICITY) 1.5 MV/0.0OT SOPN; 1.5mg weekly  -      DIABETES FOOT EXAM    Uncontrolled type 2 diabetes mellitus with diabetic nephropathy, with long-term current use of insulin (HCC)  -     Dulaglutide (TRULICITY) 1.5 EY/5.8NQ SOPN; 1.5mg weekly  -      DIABETES FOOT EXAM  -     Dulaglutide (TRULICITY) 9.46 SK/5.9QU SOPN; Use as directed  -     Dulaglutide (TRULICITY) 1.5 UP/2.1SJ SOPN; Use as directed    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 ([address]    Dyslipidemia associated with type 2 diabetes mellitus ([address]    Essential hypertension          1: Type 2 DM complicated with nephropathy, neuropathy TIAs, carotid stenosis   Uncontrolled A1C 11.7% Sep 2017   A1C of <8 would be acceptable because of microvascular and macrovascular complications. C/w Metformin 9372CZ bid   Add Trulicity 7.65PC weekly then increase to 1.5mg weekly, no history of pancreatitis or family history of thyroid cancer     Change Basaglar to 50 units at bedtime   Change Novolog to 20 units with meals and 5 units with snacks. No correction scale. All instructions provided in written.    Check Blood five-seven days per week.     Following instructions for being active throughout the day in addition to formal exercise:  Walk instead of drive whenever possible  Take the stairs instead of the elevator  Work in the garden  Park to the far end of the parking lot to add more walking steps to destination      Electronically signed by Ryan Morel MD on 12/20/2017 at 8:39 PM

## 2017-12-19 NOTE — PATIENT INSTRUCTIONS
when cooking. · Don't skip meals. Your blood sugar may drop too low if you skip meals and take insulin or certain medicines for diabetes. · Check with your doctor before you drink alcohol. Alcohol can cause your blood sugar to drop too low. Alcohol can also cause a bad reaction if you take certain diabetes medicines. Follow-up care is a key part of your treatment and safety. Be sure to make and go to all appointments, and call your doctor if you are having problems. It's also a good idea to know your test results and keep a list of the medicines you take. Where can you learn more? Go to [email or web address]://[email or web address]. org and sign in to your [employer] account. Enter E581 in the Everypost box to learn more about \"Learning About Diabetes Food Guidelines. \"     If you do not have an account, please click on the \"Sign Up Now\" link. Current as of: March 13, 2017  Content Version: 11.4  © 9193-6067 Healthwise, Incorporated. Care instructions adapted under license by [facility] ([facility]). If you have questions about a medical condition or this instruction, always ask your healthcare professional. [place] [address] any warranty or liability for your use of this information.

## 2017-12-26 ENCOUNTER — TELEPHONE (OUTPATIENT)
Dept: ENDOCRINOLOGY | Age: 57
End: 2017-12-26

## 2017-12-26 NOTE — TELEPHONE ENCOUNTER
PA request submitted for Trulicity 1.5 XV/9.4 pen has been denied based on the information received.  Please submit alternative Byetta or Victoza or letter of appeal.

## 2017-12-29 NOTE — TELEPHONE ENCOUNTER
Victoza has been approved per THE [facility]  Approved for the following time period:    12/28/2017-12/28/2018

## 2018-01-01 ENCOUNTER — HOSPITAL ENCOUNTER (OUTPATIENT)
Dept: PHYSICAL THERAPY | Age: 58
Discharge: HOME | End: 2018-01-31
Attending: ORTHOPAEDIC SURGERY | Admitting: ORTHOPAEDIC SURGERY

## 2018-01-02 DIAGNOSIS — M54.50 ACUTE BILATERAL LOW BACK PAIN WITHOUT SCIATICA: ICD-10-CM

## 2018-01-05 ENCOUNTER — OFFICE VISIT (OUTPATIENT)
Dept: ENDOCRINOLOGY | Age: 58
End: 2018-01-05

## 2018-01-05 VITALS
HEART RATE: 108 BPM | SYSTOLIC BLOOD PRESSURE: 150 MMHG | HEIGHT: 63 IN | OXYGEN SATURATION: 96 % | DIASTOLIC BLOOD PRESSURE: 81 MMHG | BODY MASS INDEX: 36.5 KG/M2 | WEIGHT: 206 LBS

## 2018-01-05 DIAGNOSIS — I10 ESSENTIAL HYPERTENSION: Chronic | ICD-10-CM

## 2018-01-05 DIAGNOSIS — E11.69 DYSLIPIDEMIA ASSOCIATED WITH TYPE 2 DIABETES MELLITUS (HCC): Primary | ICD-10-CM

## 2018-01-05 DIAGNOSIS — I63.40 CEREBROVASCULAR ACCIDENT (CVA) DUE TO EMBOLISM OF CEREBRAL ARTERY (HCC): Chronic | ICD-10-CM

## 2018-01-05 DIAGNOSIS — E78.5 DYSLIPIDEMIA ASSOCIATED WITH TYPE 2 DIABETES MELLITUS (HCC): Primary | ICD-10-CM

## 2018-01-05 DIAGNOSIS — E11.59 TYPE 2 DIABETES MELLITUS WITH VASCULAR DISEASE (HCC): ICD-10-CM

## 2018-01-05 PROCEDURE — G8427 DOCREV CUR MEDS BY ELIG CLIN: HCPCS | Performed by: INTERNAL MEDICINE

## 2018-01-05 PROCEDURE — 3017F COLORECTAL CA SCREEN DOC REV: CPT | Performed by: INTERNAL MEDICINE

## 2018-01-05 PROCEDURE — G8599 NO ASA/ANTIPLAT THER USE RNG: HCPCS | Performed by: INTERNAL MEDICINE

## 2018-01-05 PROCEDURE — 3046F HEMOGLOBIN A1C LEVEL >9.0%: CPT | Performed by: INTERNAL MEDICINE

## 2018-01-05 PROCEDURE — 99214 OFFICE O/P EST MOD 30 MIN: CPT | Performed by: INTERNAL MEDICINE

## 2018-01-05 PROCEDURE — 1036F TOBACCO NON-USER: CPT | Performed by: INTERNAL MEDICINE

## 2018-01-05 PROCEDURE — G8484 FLU IMMUNIZE NO ADMIN: HCPCS | Performed by: INTERNAL MEDICINE

## 2018-01-05 PROCEDURE — 3014F SCREEN MAMMO DOC REV: CPT | Performed by: INTERNAL MEDICINE

## 2018-01-05 PROCEDURE — G8417 CALC BMI ABV UP PARAM F/U: HCPCS | Performed by: INTERNAL MEDICINE

## 2018-01-05 NOTE — PROGRESS NOTES
Discharged on 12/06/2017   Component Date Value Ref Range Status    POC Glucose 12/06/2017 199* 70 - 99 mg/dl Final    Performed on 12/06/2017 ACCU-CHEK   Final    WBC 12/06/2017 8.4  4.0 - 11.0 K/uL Final    RBC 12/06/2017 4.16  4.00 - 5.20 M/uL Final    Hemoglobin 12/06/2017 10.5* 12.0 - 16.0 g/dL Final    Hematocrit 12/06/2017 33.3* 36.0 - 48.0 % Final    MCV 12/06/2017 79.9* 80.0 - 100.0 fL Final    MCH 12/06/2017 25.1* 26.0 - 34.0 pg Final    MCHC 12/06/2017 31.4  31.0 - 36.0 g/dL Final    RDW 12/06/2017 16.6* 12.4 - 15.4 % Final    Platelets 01/64/3634 182  135 - 450 K/uL Final    MPV 12/06/2017 9.1  5.0 - 10.5 fL Final    Neutrophils % 12/06/2017 58.9  % Final    Lymphocytes % 12/06/2017 32.7  % Final    Monocytes % 12/06/2017 6.2  % Final    Eosinophils % 12/06/2017 1.2  % Final    Basophils % 12/06/2017 1.0  % Final    Neutrophils # 12/06/2017 4.9  1.7 - 7.7 K/uL Final    Lymphocytes # 12/06/2017 2.8  1.0 - 5.1 K/uL Final    Monocytes # 12/06/2017 0.5  0.0 - 1.3 K/uL Final    Eosinophils # 12/06/2017 0.1  0.0 - 0.6 K/uL Final    Basophils # 12/06/2017 0.1  0.0 - 0.2 K/uL Final    Sodium 12/06/2017 135* 136 - 145 mmol/L Final    Potassium 12/06/2017 4.5  3.5 - 5.1 mmol/L Final    Chloride 12/06/2017 97* 99 - 110 mmol/L Final    CO2 12/06/2017 25  21 - 32 mmol/L Final    Anion Gap 12/06/2017 13  3 - 16 Final    Glucose 12/06/2017 195* 70 - 99 mg/dL Final    BUN 12/06/2017 28* 7 - 20 mg/dL Final    CREATININE 12/06/2017 1.3* 0.6 - 1.1 mg/dL Final    GFR Non- 12/06/2017 42* >60 Final    GFR  12/06/2017 51* >60 Final    Calcium 12/06/2017 9.6  8.3 - 10.6 mg/dL Final    Total Protein 12/06/2017 7.8  6.4 - 8.2 g/dL Final    Alb 12/06/2017 3.8  3.4 - 5.0 g/dL Final    Albumin/Globulin Ratio 12/06/2017 1.0* 1.1 - 2.2 Final    Total Bilirubin 12/06/2017 <0.2  0.0 - 1.0 mg/dL Final    Alkaline Phosphatase 12/06/2017 140* 40 - 129 U/L Final    ALT mg/dL Final    POC Glucose 09/17/2017 306* 70 - 99 mg/dl Final    Performed on 09/17/2017 ACCU-CHEK   Final    POC Glucose 09/17/2017 218* 70 - 99 mg/dl Final    Performed on 09/17/2017 ACCU-CHEK   Final    POC Glucose 09/17/2017 234* 70 - 99 mg/dl Final    Performed on 09/17/2017 ACCU-CHEK   Final    Sodium 09/18/2017 138  136 - 145 mmol/L Final    Potassium 09/18/2017 5.0  3.5 - 5.1 mmol/L Final    Chloride 09/18/2017 101  99 - 110 mmol/L Final    CO2 09/18/2017 22  21 - 32 mmol/L Final    Anion Gap 09/18/2017 15  3 - 16 Final    Glucose 09/18/2017 324* 70 - 99 mg/dL Final    BUN 09/18/2017 23* 7 - 20 mg/dL Final    CREATININE 09/18/2017 1.0  0.6 - 1.1 mg/dL Final    GFR Non- 09/18/2017 57* >60 Final    GFR  09/18/2017 >60  >60 Final    Calcium 09/18/2017 9.1  8.3 - 10.6 mg/dL Final    WBC 09/18/2017 8.3  4.0 - 11.0 K/uL Final    RBC 09/18/2017 3.95* 4.00 - 5.20 M/uL Final    Hemoglobin 09/18/2017 10.7* 12.0 - 16.0 g/dL Final    Hematocrit 09/18/2017 33.1* 36.0 - 48.0 % Final    MCV 09/18/2017 83.9  80.0 - 100.0 fL Final    MCH 09/18/2017 27.1  26.0 - 34.0 pg Final    MCHC 09/18/2017 32.4  31.0 - 36.0 g/dL Final    RDW 09/18/2017 14.3  12.4 - 15.4 % Final    Platelets 60/55/3526 160  135 - 450 K/uL Final    MPV 09/18/2017 9.7  5.0 - 10.5 fL Final    POC Glucose 09/17/2017 311* 70 - 99 mg/dl Final    Performed on 09/17/2017 ACCU-CHEK   Final    POC Glucose 09/18/2017 277* 70 - 99 mg/dl Final    Performed on 09/18/2017 ACCU-CHEK   Final    POC Glucose 09/18/2017 453* 70 - 99 mg/dl Final    Performed on 09/18/2017 ACCU-CHEK   Final    POC Glucose 09/18/2017 368* 70 - 99 mg/dl Final    Performed on 09/18/2017 ACCU-CHEK   Final    POC Glucose 09/18/2017 339* 70 - 99 mg/dl Final    Performed on 09/18/2017 ACCU-CHEK   Final    POC Glucose 09/18/2017 285* 70 - 99 mg/dl Final    Performed on 09/18/2017 ACCU-CHEK   Final    POC Glucose 09/19/2017 153* 06/01/2017 97  0 - 150 mg/dL Final    HDL 06/01/2017 62* 40 - 60 mg/dL Final    LDL Calculated 06/01/2017 42  <100 mg/dL Final    VLDL CHOLESTEROL CALCULATED 06/01/2017 19  Not Established mg/dL Final    Sodium 06/01/2017 140  136 - 145 mmol/L Final    Potassium 06/01/2017 4.3  3.5 - 5.1 mmol/L Final    Chloride 06/01/2017 100  99 - 110 mmol/L Final    CO2 06/01/2017 26  21 - 32 mmol/L Final    Anion Gap 06/01/2017 14  3 - 16 Final    Glucose 06/01/2017 139* 70 - 99 mg/dL Final    BUN 06/01/2017 16  7 - 20 mg/dL Final    CREATININE 06/01/2017 0.9  0.6 - 1.1 mg/dL Final    GFR Non- 06/01/2017 >60  >60 Final    GFR  06/01/2017 >60  >60 Final    Calcium 06/01/2017 10.3  8.3 - 10.6 mg/dL Final    Total Protein 06/01/2017 8.0  6.4 - 8.2 g/dL Final    Alb 06/01/2017 4.4  3.4 - 5.0 g/dL Final    Albumin/Globulin Ratio 06/01/2017 1.2  1.1 - 2.2 Final    Total Bilirubin 06/01/2017 <0.2  0.0 - 1.0 mg/dL Final    Alkaline Phosphatase 06/01/2017 139* 40 - 129 U/L Final    ALT 06/01/2017 31  10 - 40 U/L Final    AST 06/01/2017 23  15 - 37 U/L Final    Globulin 06/01/2017 3.6  g/dL Final    Hemoglobin A1C 06/02/2017 8.4  See comment % Final    eAG 06/02/2017 194.4  mg/dL Final   Hospital Outpatient Visit on 03/02/2017   Component Date Value Ref Range Status    Sodium 03/02/2017 139  136 - 145 mmol/L Final    Potassium 03/02/2017 4.6  3.5 - 5.1 mmol/L Final    Chloride 03/02/2017 99  99 - 110 mmol/L Final    CO2 03/02/2017 25  21 - 32 mmol/L Final    Anion Gap 03/02/2017 15  3 - 16 Final    Glucose 03/02/2017 115* 70 - 99 mg/dL Final    BUN 03/02/2017 19  7 - 20 mg/dL Final    CREATININE 03/02/2017 1.0  0.6 - 1.1 mg/dL Final    GFR Non- 03/02/2017 57* >60 Final    GFR  03/02/2017 >60  >60 Final    Calcium 03/02/2017 10.4  8.3 - 10.6 mg/dL Final    Total Protein 03/02/2017 8.1  6.4 - 8.2 g/dL Final    Alb 03/02/2017 4.3  3.4 - 5.0 g/dL Final    Albumin/Globulin Ratio 03/02/2017 1.1  1.1 - 2.2 Final    Total Bilirubin 03/02/2017 0.3  0.0 - 1.0 mg/dL Final    Alkaline Phosphatase 03/02/2017 119  40 - 129 U/L Final    ALT 03/02/2017 26  10 - 40 U/L Final    AST 03/02/2017 21  15 - 37 U/L Final    Globulin 03/02/2017 3.8  g/dL Final    Cholesterol, Total 03/02/2017 143  0 - 199 mg/dL Final    Triglycerides 03/02/2017 93  0 - 150 mg/dL Final    HDL 03/02/2017 67* 40 - 60 mg/dL Final    LDL Calculated 03/02/2017 57  <100 mg/dL Final    VLDL CHOLESTEROL CALCULATED 03/02/2017 19  Not Established mg/dL Final           Assessment and Plan     There are no diagnoses linked to this encounter. 1: Type 2 DM complicated with nephropathy, neuropathy TIAs, carotid stenosis   Uncontrolled A1C 11.7% Sep 2017   A1C of <8 would be acceptable because of microvascular and macrovascular complications. C/w Metformin 1000mg bid   Victoza 1.8mg in am   Change Basaglar to 40 units at bedtime   Change Novolog to 15 units with meals and 5 units with snacks. No correction scale. All instructions provided in written. Check Blood sugars 4 times per day. Log them along with insulin and send them every 2 weeks. Call for blood sugars less than 60 or more than 400. Eye exam: Last exam in 2016, Needs an exam now. Reminded her again. Foot exam:  Dec 2017   Deformity/amputation: absent  Skin lesions/pre-ulcerative calluses: absent  Edema: right- negative, left- negative    Sensory exam: Monofilament sensation: normal  Plus at least one of the following:   Pulses: normal,   Vibration (128 Hz): Moderately decreased     Renal screen: acute on CKD, stage 3, right kidney removed. Avoid NSAIDs. Counselled for smoking cessation   TSH screen: later  Saw CDE in 2016 with Rd.     2: HTN   Controlled     3: Hyperlipidemia   LDL: 31 , HDL: 47 , TGs: 125 July 2017    Takes something at night for cholesterol, will call us later.  Probably lipitor     RTC in 6

## 2018-01-08 ENCOUNTER — TELEPHONE (OUTPATIENT)
Dept: ENDOCRINOLOGY | Age: 58
End: 2018-01-08

## 2018-01-31 ENCOUNTER — TELEPHONE (OUTPATIENT)
Dept: ENDOCRINOLOGY | Age: 58
End: 2018-01-31

## 2018-01-31 DIAGNOSIS — Z79.4 TYPE 2 DIABETES MELLITUS WITHOUT COMPLICATION, WITH LONG-TERM CURRENT USE OF INSULIN (HCC): Primary | ICD-10-CM

## 2018-01-31 DIAGNOSIS — E11.9 TYPE 2 DIABETES MELLITUS WITHOUT COMPLICATION, WITH LONG-TERM CURRENT USE OF INSULIN (HCC): Primary | ICD-10-CM

## 2018-01-31 NOTE — TELEPHONE ENCOUNTER
Called [facility] and spoke with Jannie Ortiz last refill was from previous prescribing physician which was vials. Mrs. Oliver Conley would like pens see to her pharmacy as she is unable to see the numbers on the syringes. Daughter will contact Jannie Ortiz as well to make sure updates her records.

## 2018-01-31 NOTE — TELEPHONE ENCOUNTER
Estefani Horvath called to ask why her Mother has been changed from novolog pens to vials and needles? Her mother has a hard time seeing the small writing and needles and has a better time with the pens.  She would like a cb at her cell [phone]

## 2018-02-15 ENCOUNTER — HOSPITAL ENCOUNTER (OUTPATIENT)
Dept: PHYSICAL THERAPY | Age: 58
Discharge: HOME | End: 2018-02-28
Admitting: PHYSICIAN ASSISTANT

## 2018-02-15 DIAGNOSIS — M54.50 LOW BACK PAIN: ICD-10-CM

## 2018-02-15 ASSESSMENT — PAIN DESCRIPTION - ORIENTATION: ORIENTATION: LEFT;LOWER

## 2018-02-15 ASSESSMENT — PAIN DESCRIPTION - LOCATION: LOCATION: BACK

## 2018-02-15 ASSESSMENT — PAIN SCALES - GENERAL: PAINLEVEL_OUTOF10: 8

## 2018-02-15 NOTE — FLOWSHEET NOTE
Follow-up:  [] Yes  [] No    Plan: [] Continue per plan of care [] Alter current plan (see comments)   [] Plan of care initiated [] Hold pending MD visit [] Discharge    See Weekly Progress Note: [] Yes  [] No  Next due:

## 2018-02-16 ENCOUNTER — OFFICE VISIT (OUTPATIENT)
Dept: ENDOCRINOLOGY | Age: 58
End: 2018-02-16

## 2018-02-16 VITALS
SYSTOLIC BLOOD PRESSURE: 152 MMHG | HEIGHT: 63 IN | OXYGEN SATURATION: 99 % | HEART RATE: 90 BPM | DIASTOLIC BLOOD PRESSURE: 76 MMHG | BODY MASS INDEX: 35.26 KG/M2 | WEIGHT: 199 LBS

## 2018-02-16 DIAGNOSIS — E11.69 DYSLIPIDEMIA ASSOCIATED WITH TYPE 2 DIABETES MELLITUS (HCC): ICD-10-CM

## 2018-02-16 DIAGNOSIS — I10 ESSENTIAL HYPERTENSION: Chronic | ICD-10-CM

## 2018-02-16 DIAGNOSIS — E78.5 DYSLIPIDEMIA ASSOCIATED WITH TYPE 2 DIABETES MELLITUS (HCC): ICD-10-CM

## 2018-02-16 DIAGNOSIS — E11.59 TYPE 2 DIABETES MELLITUS WITH VASCULAR DISEASE (HCC): ICD-10-CM

## 2018-02-16 PROBLEM — R06.02 SOBOE (SHORTNESS OF BREATH ON EXERTION): Status: ACTIVE | Noted: 2017-05-05
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
## 2018-02-16 LAB
BACTERIA: ABNORMAL /HPF
BILIRUBIN URINE: NEGATIVE
BLOOD, URINE: ABNORMAL
CLARITY: ABNORMAL
COLOR: YELLOW
CREATININE URINE: 204.3 MG/DL (ref 28–259)
EPITHELIAL CELLS, UA: 3 /HPF (ref 0–5)
GLUCOSE URINE: >=1000 MG/DL
HYALINE CASTS: 1 /LPF (ref 0–8)
KETONES, URINE: NEGATIVE MG/DL
LEUKOCYTE ESTERASE, URINE: ABNORMAL
MICROALBUMIN UR-MCNC: 13.9 MG/DL
MICROALBUMIN/CREAT UR-RTO: 68 MG/G (ref 0–30)
MICROSCOPIC EXAMINATION: YES
NITRITE, URINE: POSITIVE
PH UA: 5.5
PROTEIN UA: 30 MG/DL
RBC UA: 4 /HPF (ref 0–4)
SPECIFIC GRAVITY UA: 1.03
TSH REFLEX FT4: 0.87 UIU/ML (ref 0.27–4.2)
URINE TYPE: ABNORMAL
UROBILINOGEN, URINE: 0.2 E.U./DL
WBC UA: 397 /HPF (ref 0–5)

## 2018-02-16 PROCEDURE — 3046F HEMOGLOBIN A1C LEVEL >9.0%: CPT | Performed by: INTERNAL MEDICINE

## 2018-02-16 PROCEDURE — 3014F SCREEN MAMMO DOC REV: CPT | Performed by: INTERNAL MEDICINE

## 2018-02-16 PROCEDURE — G8417 CALC BMI ABV UP PARAM F/U: HCPCS | Performed by: INTERNAL MEDICINE

## 2018-02-16 PROCEDURE — G8484 FLU IMMUNIZE NO ADMIN: HCPCS | Performed by: INTERNAL MEDICINE

## 2018-02-16 PROCEDURE — 1036F TOBACCO NON-USER: CPT | Performed by: INTERNAL MEDICINE

## 2018-02-16 PROCEDURE — 3017F COLORECTAL CA SCREEN DOC REV: CPT | Performed by: INTERNAL MEDICINE

## 2018-02-16 PROCEDURE — 99214 OFFICE O/P EST MOD 30 MIN: CPT | Performed by: INTERNAL MEDICINE

## 2018-02-16 PROCEDURE — G8427 DOCREV CUR MEDS BY ELIG CLIN: HCPCS | Performed by: INTERNAL MEDICINE

## 2018-02-16 PROCEDURE — G8598 ASA/ANTIPLAT THER USED: HCPCS | Performed by: INTERNAL MEDICINE

## 2018-02-16 RX ORDER — LETROZOLE 2.5 MG/1
2.5 TABLET, FILM COATED ORAL
COMMUNITY
Start: 2018-01-16 | End: 2018-06-19 | Stop reason: ALTCHOICE

## 2018-02-16 RX ORDER — CLONAZEPAM 0.5 MG/1
0.5 TABLET ORAL 3 TIMES DAILY PRN
COMMUNITY
Start: 2018-01-22 | End: 2018-06-19 | Stop reason: SDUPTHER

## 2018-02-16 RX ORDER — PALIPERIDONE 9 MG/1
9 TABLET, EXTENDED RELEASE ORAL EVERY MORNING
Status: ON HOLD | COMMUNITY
Start: 2018-01-22 | End: 2021-02-03 | Stop reason: HOSPADM

## 2018-02-16 RX ORDER — SIMVASTATIN 20 MG
20 TABLET ORAL NIGHTLY
COMMUNITY
Start: 2018-02-05 | End: 2020-05-01

## 2018-02-16 NOTE — PROGRESS NOTES
nightly, Disp: , Rfl:     glucagon 1 MG injection, Inject 1 mg into the skin See Admin Instructions Follow package directions for low blood sugar., Disp: 1 kit, Rfl: 0    omeprazole (PRILOSEC) 20 MG capsule, Take 1 capsule by mouth daily, Disp: 30 capsule, Rfl: 0    letrozole (FEMARA) 2.5 MG tablet, Take 2.5 mg by mouth, Disp: , Rfl:       Review of Systems:    Constitutional: Negative for fever, chills, and unexpected weight change. HENT: Negative for congestion, ear pain, rhinorrhea,  sore throat and trouble swallowing. Eyes: Negative for photophobia, redness, itching. Respiratory: Negative for cough, shortness of breath and sputum. Cardiovascular: Negative for chest pain, palpitations and leg swelling. Gastrointestinal: Negative for nausea, vomiting, abdominal pain, diarrhea, constipation. Endocrine: Negative for cold intolerance, heat intolerance, polydipsia, polyphagia and polyuria. Genitourinary: Negative for dysuria, urgency, frequency, hematuria and flank pain. Musculoskeletal: Negative for myalgias, back pain, arthralgias and neck pain. Skin/Nail: Negative for rash, itching. Normal nails. Neurological: Negative for seizures, weakness, light-headedness, numbness and headaches. Hematological/ Lymph nodes: Negative for adenopathy. Does not bruise/bleed easily. Psychiatric/Behavioral: Negative for suicidal ideas, depression, anxiety, sleep disturbance and decreased concentration. Objective:   Physical Exam:  BP (!) 152/76 (Site: Left Arm, Position: Sitting, Cuff Size: Large Adult)   Pulse 90   Ht 5' 3\" (1.6 m)   Wt 199 lb (90.3 kg)   SpO2 99%   Breastfeeding? No   BMI 35.25 kg/m²   Constitutional: Patient is oriented to person, place, and time. Patient appears well-developed and well-nourished. HENT:    Head: Normocephalic and atraumatic. Eyes: Conjunctivae and EOM are normal. Pupils are equal, round, and reactive to light. Neck: Normal range of motion. Cardiovascular: Normal rate, regular rhythm and normal heart sounds. Pulmonary/Chest: Effort normal and breath sounds normal.   Abdominal: Soft. Bowel sounds are normal.   Musculoskeletal: Normal range of motion. Neurological: Patient is alert and oriented to person, place, and time. Patient has normal reflexes. Skin: Skin is warm and dry. Psychiatric: Patient has a normal mood and affect.  Patient behavior is normal.     Lab Review:    Admission on 12/06/2017, Discharged on 12/06/2017   Component Date Value Ref Range Status    POC Glucose 12/06/2017 199* 70 - 99 mg/dl Final    Performed on 12/06/2017 ACCU-CHEK   Final    WBC 12/06/2017 8.4  4.0 - 11.0 K/uL Final    RBC 12/06/2017 4.16  4.00 - 5.20 M/uL Final    Hemoglobin 12/06/2017 10.5* 12.0 - 16.0 g/dL Final    Hematocrit 12/06/2017 33.3* 36.0 - 48.0 % Final    MCV 12/06/2017 79.9* 80.0 - 100.0 fL Final    MCH 12/06/2017 25.1* 26.0 - 34.0 pg Final    MCHC 12/06/2017 31.4  31.0 - 36.0 g/dL Final    RDW 12/06/2017 16.6* 12.4 - 15.4 % Final    Platelets 20/32/1240 182  135 - 450 K/uL Final    MPV 12/06/2017 9.1  5.0 - 10.5 fL Final    Neutrophils % 12/06/2017 58.9  % Final    Lymphocytes % 12/06/2017 32.7  % Final    Monocytes % 12/06/2017 6.2  % Final    Eosinophils % 12/06/2017 1.2  % Final    Basophils % 12/06/2017 1.0  % Final    Neutrophils # 12/06/2017 4.9  1.7 - 7.7 K/uL Final    Lymphocytes # 12/06/2017 2.8  1.0 - 5.1 K/uL Final    Monocytes # 12/06/2017 0.5  0.0 - 1.3 K/uL Final    Eosinophils # 12/06/2017 0.1  0.0 - 0.6 K/uL Final    Basophils # 12/06/2017 0.1  0.0 - 0.2 K/uL Final    Sodium 12/06/2017 135* 136 - 145 mmol/L Final    Potassium 12/06/2017 4.5  3.5 - 5.1 mmol/L Final    Chloride 12/06/2017 97* 99 - 110 mmol/L Final    CO2 12/06/2017 25  21 - 32 mmol/L Final    Anion Gap 12/06/2017 13  3 - 16 Final    Glucose 12/06/2017 195* 70 - 99 mg/dL Final    BUN 12/06/2017 28* 7 - 20 mg/dL Final    CREATININE 12/06/2017 1.3* 0.6 - 1.1 mg/dL Final    GFR Non- 12/06/2017 42* >60 Final    GFR  12/06/2017 51* >60 Final    Calcium 12/06/2017 9.6  8.3 - 10.6 mg/dL Final    Total Protein 12/06/2017 7.8  6.4 - 8.2 g/dL Final    Alb 12/06/2017 3.8  3.4 - 5.0 g/dL Final    Albumin/Globulin Ratio 12/06/2017 1.0* 1.1 - 2.2 Final    Total Bilirubin 12/06/2017 <0.2  0.0 - 1.0 mg/dL Final    Alkaline Phosphatase 12/06/2017 140* 40 - 129 U/L Final    ALT 12/06/2017 19  10 - 40 U/L Final    AST 12/06/2017 19  15 - 37 U/L Final    Globulin 12/06/2017 4.0  g/dL Final    Color, UA 12/06/2017 Yellow  Straw/Yellow Final    Clarity, UA 12/06/2017 CLOUDY* Clear Final    Glucose, Ur 12/06/2017 100* Negative mg/dL Final    Bilirubin Urine 12/06/2017 Negative  Negative Final    Ketones, Urine 12/06/2017 TRACE* Negative mg/dL Final    Specific Gravity, UA 12/06/2017 >=1.030  1.005 - 1.030 Final    Blood, Urine 12/06/2017 SMALL* Negative Final    pH, UA 12/06/2017 5.0  5.0 - 8.0 Final    Protein, UA 12/06/2017 30* Negative mg/dL Final    Urobilinogen, Urine 12/06/2017 0.2  <2.0 E.U./dL Final    Nitrite, Urine 12/06/2017 Negative  Negative Final    Leukocyte Esterase, Urine 12/06/2017 MODERATE* Negative Final    Microscopic Examination 12/06/2017 YES   Final    Urine Type 12/06/2017 Not Specified   Final    WBC, UA 12/06/2017 >100* 0 - 5 /HPF Final    RBC, UA 12/06/2017 3-5* 0 - 2 /HPF Final    Bacteria, UA 12/06/2017 3+* /HPF Final    Organism 12/08/2017 Klebsiella pneumoniae ssp pneumoniae*  Final    Urine Culture, Routine 12/08/2017 >100,000 CFU/ml   Final   Admission on 09/17/2017, Discharged on 09/21/2017   Component Date Value Ref Range Status    WBC 09/17/2017 7.8  4.0 - 11.0 K/uL Final    RBC 09/17/2017 4.28  4.00 - 5.20 M/uL Final    Hemoglobin 09/17/2017 11.4* 12.0 - 16.0 g/dL Final    Hematocrit 09/17/2017 36.1  36.0 - 48.0 % Final    MCV 09/17/2017 84.5  80.0 - 100.0 fL Final    MCH 09/17/2017 26.6  26.0 - 34.0 pg Final    MCHC 09/17/2017 31.5  31.0 - 36.0 g/dL Final    RDW 09/17/2017 14.2  12.4 - 15.4 % Final    Platelets 40/75/6410 180  135 - 450 K/uL Final    MPV 09/17/2017 11.1* 5.0 - 10.5 fL Final    Neutrophils % 09/17/2017 57.4  % Final    Lymphocytes % 09/17/2017 35.7  % Final    Monocytes % 09/17/2017 5.4  % Final    Eosinophils % 09/17/2017 0.9  % Final    Basophils % 09/17/2017 0.6  % Final    Neutrophils # 09/17/2017 4.5  1.7 - 7.7 K/uL Final    Lymphocytes # 09/17/2017 2.8  1.0 - 5.1 K/uL Final    Monocytes # 09/17/2017 0.4  0.0 - 1.3 K/uL Final    Eosinophils # 09/17/2017 0.1  0.0 - 0.6 K/uL Final    Basophils # 09/17/2017 0.0  0.0 - 0.2 K/uL Final    Sodium 09/17/2017 137  136 - 145 mmol/L Final    Potassium 09/17/2017 4.6  3.5 - 5.1 mmol/L Final    Chloride 09/17/2017 98* 99 - 110 mmol/L Final    CO2 09/17/2017 24  21 - 32 mmol/L Final    Anion Gap 09/17/2017 15  3 - 16 Final    Glucose 09/17/2017 324* 70 - 99 mg/dL Final    BUN 09/17/2017 33* 7 - 20 mg/dL Final    CREATININE 09/17/2017 1.4* 0.6 - 1.1 mg/dL Final    GFR Non- 09/17/2017 39* >60 Final    GFR  09/17/2017 47* >60 Final    Calcium 09/17/2017 9.8  8.3 - 10.6 mg/dL Final    Total Protein 09/17/2017 7.8  6.4 - 8.2 g/dL Final    Alb 09/17/2017 4.0  3.4 - 5.0 g/dL Final    Albumin/Globulin Ratio 09/17/2017 1.1  1.1 - 2.2 Final    Total Bilirubin 09/17/2017 <0.2  0.0 - 1.0 mg/dL Final    Alkaline Phosphatase 09/17/2017 102  40 - 129 U/L Final    ALT 09/17/2017 17  10 - 40 U/L Final    AST 09/17/2017 16  15 - 37 U/L Final    Globulin 09/17/2017 3.8  g/dL Final    Specimen Status 09/17/2017 KIMMY   Final    Ventricular Rate 09/22/2017 57  BPM Final    Atrial Rate 09/22/2017 57  BPM Final    P-R Interval 09/22/2017 158  ms Final    QRS Duration 09/22/2017 72  ms Final    Q-T Interval 09/22/2017 428  ms Final    QTc Calculation (Bazett) 09/22/2017 416  ms Final    P Axis 09/22/2017 23  degrees Final    R Axis 09/22/2017 -36  degrees Final    T Axis 09/22/2017 34  degrees Final    Diagnosis 09/22/2017    Final                    Value:Sinus bradycardia  Left axis deviation  Abnormal ECG  When compared with ECG of 05-JUL-2017 15:21,  No significant change was found  Confirmed by [facility] KAREEM ZHU MD ([id number]) on 9/17/2017 3:17:06 PM      Hemoglobin A1C 09/17/2017 11.7  See comment % Final    eAG 09/17/2017 289.1  mg/dL Final    POC Glucose 09/17/2017 306* 70 - 99 mg/dl Final    Performed on 09/17/2017 ACCU-CHEK   Final    POC Glucose 09/17/2017 218* 70 - 99 mg/dl Final    Performed on 09/17/2017 ACCU-CHEK   Final    POC Glucose 09/17/2017 234* 70 - 99 mg/dl Final    Performed on 09/17/2017 ACCU-CHEK   Final    Sodium 09/18/2017 138  136 - 145 mmol/L Final    Potassium 09/18/2017 5.0  3.5 - 5.1 mmol/L Final    Chloride 09/18/2017 101  99 - 110 mmol/L Final    CO2 09/18/2017 22  21 - 32 mmol/L Final    Anion Gap 09/18/2017 15  3 - 16 Final    Glucose 09/18/2017 324* 70 - 99 mg/dL Final    BUN 09/18/2017 23* 7 - 20 mg/dL Final    CREATININE 09/18/2017 1.0  0.6 - 1.1 mg/dL Final    GFR Non- 09/18/2017 57* >60 Final    GFR  09/18/2017 >60  >60 Final    Calcium 09/18/2017 9.1  8.3 - 10.6 mg/dL Final    WBC 09/18/2017 8.3  4.0 - 11.0 K/uL Final    RBC 09/18/2017 3.95* 4.00 - 5.20 M/uL Final    Hemoglobin 09/18/2017 10.7* 12.0 - 16.0 g/dL Final    Hematocrit 09/18/2017 33.1* 36.0 - 48.0 % Final    MCV 09/18/2017 83.9  80.0 - 100.0 fL Final    MCH 09/18/2017 27.1  26.0 - 34.0 pg Final    MCHC 09/18/2017 32.4  31.0 - 36.0 g/dL Final    RDW 09/18/2017 14.3  12.4 - 15.4 % Final    Platelets 24/25/0210 160  135 - 450 K/uL Final    MPV 09/18/2017 9.7  5.0 - 10.5 fL Final    POC Glucose 09/17/2017 311* 70 - 99 mg/dl Final    Performed on 09/17/2017 ACCU-CHEK   Final    POC Glucose 09/18/2017 277* 70 - 99 with meals and 5 units with snacks. No correction scale. All instructions provided in written. Check Blood sugars 4 times per day. Log them along with insulin and send them every 2 weeks. Call for blood sugars less than 60 or more than 400. Eye exam: Last exam in 2016, Needs an exam now. March 12th 2018. Foot exam:  Dec 2017   Deformity/amputation: absent  Skin lesions/pre-ulcerative calluses: absent  Edema: right- negative, left- negative    Sensory exam: Monofilament sensation: normal  Plus at least one of the following:   Pulses: normal,   Vibration (128 Hz): Moderately decreased     Renal screen: acute on CKD, stage 3, right kidney removed. Avoid NSAIDs. Counselled for smoking cessation   TSH screen: later  Saw CDE in 2016 with Rd.     2: HTN   Blood pressure in 150 last two visit   Increase lisinopril to 20mg daily     3: Hyperlipidemia   LDL: 31 , HDL: 47 , TGs: 125 July 2017    Simvastatin 20mg     Labs done today are pending and includes A1C, MACR, UA , TSH     RTC in 3 months with A1C     EDUCATION:   Greater than 50% of this follow-up visit was spent in general counseling regarding   obesity, diet, exercise, importance of adherence to insulin regime, recognition and treatment of hypo and hyperglycemia,  glucose logging, proper diabetes management, diabetic complications with poor management and the importance of glycemic control in order to avoid the complications of diabetes. Risks and potential complications of diabetes were reviewed with the patient. Diabetes health maintenance plan and follow-up were discussed and understood by the patient. We reviewed the importance of medication compliance and regular follow-up. Aggressive lifestyle modification was encouraged. Exercise Counselling: This patient is a candidate for regular physical exercise.  Instructions to perform the following types of exercise:  Swimming or water aerobic exercise  Brisk walking  Playing tennis  Stationary

## 2018-02-17 PROBLEM — N39.0 UTI (URINARY TRACT INFECTION): Status: ACTIVE | Noted: 2018-02-17

## 2018-02-17 LAB
ESTIMATED AVERAGE GLUCOSE: 182.9 MG/DL
HBA1C MFR BLD: 8 %

## 2018-02-19 ENCOUNTER — TELEPHONE (OUTPATIENT)
Dept: ENDOCRINOLOGY | Age: 58
End: 2018-02-19

## 2018-02-19 DIAGNOSIS — N30.00 ACUTE CYSTITIS WITHOUT HEMATURIA: Primary | ICD-10-CM

## 2018-02-19 LAB
ORGANISM: ABNORMAL
URINE CULTURE, ROUTINE: ABNORMAL
URINE CULTURE, ROUTINE: ABNORMAL

## 2018-02-19 RX ORDER — CEFADROXIL 500 MG/1
500 CAPSULE ORAL 2 TIMES DAILY
Qty: 20 CAPSULE | Refills: 0 | Status: SHIPPED | OUTPATIENT
Start: 2018-02-19 | End: 2018-03-01

## 2018-02-27 DIAGNOSIS — I10 ESSENTIAL HYPERTENSION: ICD-10-CM

## 2018-02-27 DIAGNOSIS — R80.9 MICROALBUMINURIA: ICD-10-CM

## 2018-02-27 RX ORDER — LISINOPRIL 10 MG/1
TABLET ORAL
Qty: 30 TABLET | Refills: 1 | Status: SHIPPED | OUTPATIENT
Start: 2018-02-27 | End: 2018-04-28 | Stop reason: SDUPTHER

## 2018-03-01 ENCOUNTER — HOSPITAL ENCOUNTER (OUTPATIENT)
Dept: OTHER | Age: 58
Discharge: HOME | End: 2018-03-31
Attending: PHYSICIAN ASSISTANT | Admitting: PHYSICIAN ASSISTANT

## 2018-03-27 DIAGNOSIS — E11.9 TYPE 2 DIABETES MELLITUS WITHOUT COMPLICATION, WITH LONG-TERM CURRENT USE OF INSULIN (HCC): ICD-10-CM

## 2018-03-27 DIAGNOSIS — Z79.4 TYPE 2 DIABETES MELLITUS WITHOUT COMPLICATION, WITH LONG-TERM CURRENT USE OF INSULIN (HCC): ICD-10-CM

## 2018-03-27 RX ORDER — INSULIN ASPART 100 [IU]/ML
15 INJECTION, SOLUTION INTRAVENOUS; SUBCUTANEOUS
Qty: 5 PEN | Refills: 3 | Status: SHIPPED | OUTPATIENT
Start: 2018-03-27 | End: 2018-07-24 | Stop reason: SDUPTHER

## 2018-03-28 ENCOUNTER — HOSPITAL ENCOUNTER (OUTPATIENT)
Dept: MRI IMAGING | Age: 58
Discharge: HOME | End: 2018-03-28
Admitting: PHYSICIAN ASSISTANT

## 2018-03-28 ENCOUNTER — TELEPHONE (OUTPATIENT)
Dept: FAMILY MEDICINE CLINIC | Age: 58
End: 2018-03-28

## 2018-03-28 DIAGNOSIS — M54.12 RADICULOPATHY, CERVICAL REGION: ICD-10-CM

## 2018-03-28 DIAGNOSIS — M54.14 RADICULOPATHY, THORACIC REGION: ICD-10-CM

## 2018-03-29 NOTE — TELEPHONE ENCOUNTER
Spoke with pt. Pt states that she is requesting for the BP machine, but she will have this requested from her new PCP.    JORDI

## 2018-04-01 ENCOUNTER — HOSPITAL ENCOUNTER (OUTPATIENT)
Dept: OTHER | Age: 58
Discharge: HOME | End: 2018-04-30
Attending: PHYSICIAN ASSISTANT | Admitting: PHYSICIAN ASSISTANT

## 2018-04-11 PROBLEM — N39.0 UTI (URINARY TRACT INFECTION): Status: RESOLVED | Noted: 2018-02-17 | Resolved: 2018-04-11
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
## 2018-04-12 PROBLEM — Z09 POSTOP CHECK: Status: RESOLVED | Noted: 2017-11-03 | Resolved: 2018-04-12

## 2018-04-19 LAB — DIABETIC RETINOPATHY: NORMAL

## 2018-05-23 ENCOUNTER — TELEPHONE (OUTPATIENT)
Dept: FAMILY MEDICINE CLINIC | Age: 58
End: 2018-05-23

## 2018-06-15 DIAGNOSIS — R80.9 MICROALBUMINURIA: ICD-10-CM

## 2018-06-15 DIAGNOSIS — I10 ESSENTIAL HYPERTENSION: ICD-10-CM

## 2018-06-15 RX ORDER — LISINOPRIL 10 MG/1
TABLET ORAL
Qty: 30 TABLET | Refills: 0 | Status: SHIPPED | OUTPATIENT
Start: 2018-06-15 | End: 2020-04-09 | Stop reason: SDUPTHER

## 2018-06-19 ENCOUNTER — OFFICE VISIT (OUTPATIENT)
Dept: ENDOCRINOLOGY | Age: 58
End: 2018-06-19

## 2018-06-19 VITALS
HEART RATE: 88 BPM | HEIGHT: 63 IN | DIASTOLIC BLOOD PRESSURE: 68 MMHG | SYSTOLIC BLOOD PRESSURE: 107 MMHG | BODY MASS INDEX: 35.97 KG/M2 | OXYGEN SATURATION: 95 % | WEIGHT: 203 LBS

## 2018-06-19 DIAGNOSIS — E78.5 DYSLIPIDEMIA ASSOCIATED WITH TYPE 2 DIABETES MELLITUS (HCC): ICD-10-CM

## 2018-06-19 DIAGNOSIS — E11.59 TYPE 2 DIABETES MELLITUS WITH VASCULAR DISEASE (HCC): ICD-10-CM

## 2018-06-19 DIAGNOSIS — E11.69 DYSLIPIDEMIA ASSOCIATED WITH TYPE 2 DIABETES MELLITUS (HCC): ICD-10-CM

## 2018-06-19 DIAGNOSIS — I10 ESSENTIAL HYPERTENSION: Chronic | ICD-10-CM

## 2018-06-19 PROCEDURE — 3014F SCREEN MAMMO DOC REV: CPT | Performed by: INTERNAL MEDICINE

## 2018-06-19 PROCEDURE — 3017F COLORECTAL CA SCREEN DOC REV: CPT | Performed by: INTERNAL MEDICINE

## 2018-06-19 PROCEDURE — 2022F DILAT RTA XM EVC RTNOPTHY: CPT | Performed by: INTERNAL MEDICINE

## 2018-06-19 PROCEDURE — G8598 ASA/ANTIPLAT THER USED: HCPCS | Performed by: INTERNAL MEDICINE

## 2018-06-19 PROCEDURE — 1036F TOBACCO NON-USER: CPT | Performed by: INTERNAL MEDICINE

## 2018-06-19 PROCEDURE — 99214 OFFICE O/P EST MOD 30 MIN: CPT | Performed by: INTERNAL MEDICINE

## 2018-06-19 PROCEDURE — 3045F PR MOST RECENT HEMOGLOBIN A1C LEVEL 7.0-9.0%: CPT | Performed by: INTERNAL MEDICINE

## 2018-06-19 PROCEDURE — G8417 CALC BMI ABV UP PARAM F/U: HCPCS | Performed by: INTERNAL MEDICINE

## 2018-06-19 PROCEDURE — G8427 DOCREV CUR MEDS BY ELIG CLIN: HCPCS | Performed by: INTERNAL MEDICINE

## 2018-06-19 RX ORDER — CLONAZEPAM 0.5 MG/1
TABLET ORAL
COMMUNITY
Start: 2018-03-22 | End: 2018-06-19 | Stop reason: SDUPTHER

## 2018-06-19 RX ORDER — SIMVASTATIN 20 MG
TABLET ORAL
COMMUNITY
End: 2018-06-19 | Stop reason: SDUPTHER

## 2018-06-19 RX ORDER — OXYCODONE HYDROCHLORIDE AND ACETAMINOPHEN 5; 325 MG/1; MG/1
TABLET ORAL
COMMUNITY
End: 2018-06-19 | Stop reason: SDUPTHER

## 2018-06-19 ASSESSMENT — PATIENT HEALTH QUESTIONNAIRE - PHQ9
1. LITTLE INTEREST OR PLEASURE IN DOING THINGS: 0
SUM OF ALL RESPONSES TO PHQ9 QUESTIONS 1 & 2: 0
2. FEELING DOWN, DEPRESSED OR HOPELESS: 0
SUM OF ALL RESPONSES TO PHQ QUESTIONS 1-9: 0

## 2018-06-20 LAB
ESTIMATED AVERAGE GLUCOSE: 185.8 MG/DL
HBA1C MFR BLD: 8.1 %

## 2018-06-28 ENCOUNTER — HOSPITAL ENCOUNTER (OUTPATIENT)
Dept: MAMMOGRAPHY | Age: 58
Discharge: HOME | End: 2018-06-28
Admitting: NURSE PRACTITIONER

## 2018-06-28 DIAGNOSIS — Z85.3 HX: BREAST CANCER: ICD-10-CM

## 2018-07-16 ENCOUNTER — TELEPHONE (OUTPATIENT)
Dept: ENDOCRINOLOGY | Age: 58
End: 2018-07-16

## 2018-08-01 ENCOUNTER — OFFICE VISIT (OUTPATIENT)
Dept: ENDOCRINOLOGY | Age: 58
End: 2018-08-01

## 2018-08-01 VITALS
BODY MASS INDEX: 35.79 KG/M2 | WEIGHT: 202 LBS | HEIGHT: 63 IN | SYSTOLIC BLOOD PRESSURE: 133 MMHG | OXYGEN SATURATION: 95 % | HEART RATE: 106 BPM | DIASTOLIC BLOOD PRESSURE: 71 MMHG

## 2018-08-01 DIAGNOSIS — I10 ESSENTIAL HYPERTENSION: ICD-10-CM

## 2018-08-01 DIAGNOSIS — E78.5 DYSLIPIDEMIA ASSOCIATED WITH TYPE 2 DIABETES MELLITUS (HCC): ICD-10-CM

## 2018-08-01 DIAGNOSIS — E11.59 TYPE 2 DIABETES MELLITUS WITH VASCULAR DISEASE (HCC): ICD-10-CM

## 2018-08-01 DIAGNOSIS — E11.42 DIABETIC POLYNEUROPATHY ASSOCIATED WITH TYPE 2 DIABETES MELLITUS (HCC): Primary | ICD-10-CM

## 2018-08-01 DIAGNOSIS — E11.69 DYSLIPIDEMIA ASSOCIATED WITH TYPE 2 DIABETES MELLITUS (HCC): ICD-10-CM

## 2018-08-01 PROBLEM — G89.4 CHRONIC PAIN DISORDER: Status: ACTIVE | Noted: 2018-07-18

## 2018-08-01 PROBLEM — M41.57 OTHER SECONDARY SCOLIOSIS, LUMBOSACRAL REGION: Status: ACTIVE | Noted: 2018-07-18

## 2018-08-01 PROBLEM — M51.36 DDD (DEGENERATIVE DISC DISEASE), LUMBAR: Status: ACTIVE | Noted: 2018-07-18

## 2018-08-01 PROBLEM — M47.814 THORACIC SPONDYLOSIS WITHOUT MYELOPATHY: Status: ACTIVE | Noted: 2018-07-18

## 2018-08-01 PROCEDURE — G8417 CALC BMI ABV UP PARAM F/U: HCPCS | Performed by: INTERNAL MEDICINE

## 2018-08-01 PROCEDURE — 3014F SCREEN MAMMO DOC REV: CPT | Performed by: INTERNAL MEDICINE

## 2018-08-01 PROCEDURE — 1036F TOBACCO NON-USER: CPT | Performed by: INTERNAL MEDICINE

## 2018-08-01 PROCEDURE — G8598 ASA/ANTIPLAT THER USED: HCPCS | Performed by: INTERNAL MEDICINE

## 2018-08-01 PROCEDURE — 2022F DILAT RTA XM EVC RTNOPTHY: CPT | Performed by: INTERNAL MEDICINE

## 2018-08-01 PROCEDURE — 3045F PR MOST RECENT HEMOGLOBIN A1C LEVEL 7.0-9.0%: CPT | Performed by: INTERNAL MEDICINE

## 2018-08-01 PROCEDURE — 3017F COLORECTAL CA SCREEN DOC REV: CPT | Performed by: INTERNAL MEDICINE

## 2018-08-01 PROCEDURE — G8427 DOCREV CUR MEDS BY ELIG CLIN: HCPCS | Performed by: INTERNAL MEDICINE

## 2018-08-01 PROCEDURE — 99214 OFFICE O/P EST MOD 30 MIN: CPT | Performed by: INTERNAL MEDICINE

## 2018-08-01 RX ORDER — CLONAZEPAM 1 MG/1
1 TABLET ORAL 3 TIMES DAILY PRN
Status: ON HOLD | COMMUNITY
End: 2021-01-27

## 2018-08-01 RX ORDER — OXYCODONE HYDROCHLORIDE AND ACETAMINOPHEN 5; 325 MG/1; MG/1
1 TABLET ORAL
COMMUNITY
Start: 2018-07-26 | End: 2018-08-01 | Stop reason: SDUPTHER

## 2018-08-01 RX ORDER — GABAPENTIN 600 MG/1
600 TABLET ORAL
COMMUNITY
Start: 2018-07-18 | End: 2018-08-01 | Stop reason: SDUPTHER

## 2018-08-01 ASSESSMENT — PATIENT HEALTH QUESTIONNAIRE - PHQ9
SUM OF ALL RESPONSES TO PHQ9 QUESTIONS 1 & 2: 0
1. LITTLE INTEREST OR PLEASURE IN DOING THINGS: 0
SUM OF ALL RESPONSES TO PHQ QUESTIONS 1-9: 0
2. FEELING DOWN, DEPRESSED OR HOPELESS: 0

## 2018-08-01 NOTE — PROGRESS NOTES
Need note from DR. Lemus (Ortho).  If joint is not hot/painful/red, could start allopurinol at 100 mg qd and see how he does and check uric acid level in a month.  It can sometimes precipitate a gout flare.  #30 tabs with a refill.  inflamm markers are normal. Uric acid level is elevated.  leena   swallowing. Eyes: Negative for photophobia, redness, itching. Respiratory: Negative for cough, shortness of breath and sputum. Cardiovascular: Negative for chest pain, palpitations and leg swelling. Gastrointestinal: Negative for nausea, vomiting, abdominal pain, diarrhea, constipation. Endocrine: Negative for cold intolerance, heat intolerance, polydipsia, polyphagia and polyuria. Genitourinary: Negative for dysuria, urgency, frequency, hematuria and flank pain. Musculoskeletal: Negative for myalgias, back pain, arthralgias and neck pain. Skin/Nail: Negative for rash, itching. Normal nails. Neurological: Negative for seizures, weakness, light-headedness, numbness and headaches. Hematological/ Lymph nodes: Negative for adenopathy. Does not bruise/bleed easily. Psychiatric/Behavioral: Negative for suicidal ideas, depression, anxiety, sleep disturbance and decreased concentration. Objective:   Physical Exam:  /71 (Site: Left Arm, Position: Sitting, Cuff Size: Large Adult)   Pulse 106   Ht 5' 3\" (1.6 m)   Wt 202 lb (91.6 kg)   SpO2 95%   Breastfeeding? No   BMI 35.78 kg/m²   Constitutional: Patient is oriented to person, place, and time. Patient appears well-developed and well-nourished. HENT:    Head: Normocephalic and atraumatic. Eyes: Conjunctivae and EOM are normal. Pupils are equal, round, and reactive to light. Neck: Normal range of motion. Cardiovascular: Normal rate, regular rhythm and normal heart sounds. Pulmonary/Chest: Effort normal and breath sounds normal.   Abdominal: Soft. Bowel sounds are normal.   Musculoskeletal: Normal range of motion. Neurological: Patient is alert and oriented to person, place, and time. Patient has normal reflexes. Skin: Skin is warm and dry. Psychiatric: Patient has a normal mood and affect.  Patient behavior is normal.     Lab Review:    Orders Only on 06/19/2018   Component Date Value Ref Range Status    Hemoglobin 12/06/2017 ACCU-CHEK   Final    WBC 12/06/2017 8.4  4.0 - 11.0 K/uL Final    RBC 12/06/2017 4.16  4.00 - 5.20 M/uL Final    Hemoglobin 12/06/2017 10.5* 12.0 - 16.0 g/dL Final    Hematocrit 12/06/2017 33.3* 36.0 - 48.0 % Final    MCV 12/06/2017 79.9* 80.0 - 100.0 fL Final    MCH 12/06/2017 25.1* 26.0 - 34.0 pg Final    MCHC 12/06/2017 31.4  31.0 - 36.0 g/dL Final    RDW 12/06/2017 16.6* 12.4 - 15.4 % Final    Platelets 50/75/5479 182  135 - 450 K/uL Final    MPV 12/06/2017 9.1  5.0 - 10.5 fL Final    Neutrophils % 12/06/2017 58.9  % Final    Lymphocytes % 12/06/2017 32.7  % Final    Monocytes % 12/06/2017 6.2  % Final    Eosinophils % 12/06/2017 1.2  % Final    Basophils % 12/06/2017 1.0  % Final    Neutrophils # 12/06/2017 4.9  1.7 - 7.7 K/uL Final    Lymphocytes # 12/06/2017 2.8  1.0 - 5.1 K/uL Final    Monocytes # 12/06/2017 0.5  0.0 - 1.3 K/uL Final    Eosinophils # 12/06/2017 0.1  0.0 - 0.6 K/uL Final    Basophils # 12/06/2017 0.1  0.0 - 0.2 K/uL Final    Sodium 12/06/2017 135* 136 - 145 mmol/L Final    Potassium 12/06/2017 4.5  3.5 - 5.1 mmol/L Final    Chloride 12/06/2017 97* 99 - 110 mmol/L Final    CO2 12/06/2017 25  21 - 32 mmol/L Final    Anion Gap 12/06/2017 13  3 - 16 Final    Glucose 12/06/2017 195* 70 - 99 mg/dL Final    BUN 12/06/2017 28* 7 - 20 mg/dL Final    CREATININE 12/06/2017 1.3* 0.6 - 1.1 mg/dL Final    GFR Non- 12/06/2017 42* >60 Final    GFR  12/06/2017 51* >60 Final    Calcium 12/06/2017 9.6  8.3 - 10.6 mg/dL Final    Total Protein 12/06/2017 7.8  6.4 - 8.2 g/dL Final    Alb 12/06/2017 3.8  3.4 - 5.0 g/dL Final    Albumin/Globulin Ratio 12/06/2017 1.0* 1.1 - 2.2 Final    Total Bilirubin 12/06/2017 <0.2  0.0 - 1.0 mg/dL Final    Alkaline Phosphatase 12/06/2017 140* 40 - 129 U/L Final    ALT 12/06/2017 19  10 - 40 U/L Final    AST 12/06/2017 19  15 - 37 U/L Final    Globulin 12/06/2017 4.0  g/dL Final    Color, UA 12/06/2017 Yellow  Straw/Yellow Final    Clarity, UA 12/06/2017 CLOUDY* Clear Final    Glucose, Ur 12/06/2017 100* Negative mg/dL Final    Bilirubin Urine 12/06/2017 Negative  Negative Final    Ketones, Urine 12/06/2017 TRACE* Negative mg/dL Final    Specific Gravity, UA 12/06/2017 >=1.030  1.005 - 1.030 Final    Blood, Urine 12/06/2017 SMALL* Negative Final    pH, UA 12/06/2017 5.0  5.0 - 8.0 Final    Protein, UA 12/06/2017 30* Negative mg/dL Final    Urobilinogen, Urine 12/06/2017 0.2  <2.0 E.U./dL Final    Nitrite, Urine 12/06/2017 Negative  Negative Final    Leukocyte Esterase, Urine 12/06/2017 MODERATE* Negative Final    Microscopic Examination 12/06/2017 YES   Final    Urine Type 12/06/2017 Not Specified   Final    WBC, UA 12/06/2017 >100* 0 - 5 /HPF Final    RBC, UA 12/06/2017 3-5* 0 - 2 /HPF Final    Bacteria, UA 12/06/2017 3+* /HPF Final    Organism 12/06/2017 Klebsiella pneumoniae ssp pneumoniae*  Final    Urine Culture, Routine 12/06/2017 >100,000 CFU/ml   Final   Admission on 09/17/2017, Discharged on 09/21/2017   Component Date Value Ref Range Status    WBC 09/16/2017 7.8  4.0 - 11.0 K/uL Final    RBC 09/16/2017 4.28  4.00 - 5.20 M/uL Final    Hemoglobin 09/16/2017 11.4* 12.0 - 16.0 g/dL Final    Hematocrit 09/16/2017 36.1  36.0 - 48.0 % Final    MCV 09/16/2017 84.5  80.0 - 100.0 fL Final    MCH 09/16/2017 26.6  26.0 - 34.0 pg Final    MCHC 09/16/2017 31.5  31.0 - 36.0 g/dL Final    RDW 09/16/2017 14.2  12.4 - 15.4 % Final    Platelets 13/08/6790 180  135 - 450 K/uL Final    MPV 09/16/2017 11.1* 5.0 - 10.5 fL Final    Neutrophils % 09/16/2017 57.4  % Final    Lymphocytes % 09/16/2017 35.7  % Final    Monocytes % 09/16/2017 5.4  % Final    Eosinophils % 09/16/2017 0.9  % Final    Basophils % 09/16/2017 0.6  % Final    Neutrophils # 09/16/2017 4.5  1.7 - 7.7 K/uL Final    Lymphocytes # 09/16/2017 2.8  1.0 - 5.1 K/uL Final    Monocytes # 09/16/2017 0.4  0.0 - 1.3 K/uL Final    Eosinophils # 09/16/2017 0.1  0.0 - 0.6 K/uL Final    Basophils # 09/16/2017 0.0  0.0 - 0.2 K/uL Final    Sodium 09/16/2017 137  136 - 145 mmol/L Final    Potassium 09/16/2017 4.6  3.5 - 5.1 mmol/L Final    Chloride 09/16/2017 98* 99 - 110 mmol/L Final    CO2 09/16/2017 24  21 - 32 mmol/L Final    Anion Gap 09/16/2017 15  3 - 16 Final    Glucose 09/16/2017 324* 70 - 99 mg/dL Final    BUN 09/16/2017 33* 7 - 20 mg/dL Final    CREATININE 09/16/2017 1.4* 0.6 - 1.1 mg/dL Final    GFR Non- 09/16/2017 39* >60 Final    GFR  09/16/2017 47* >60 Final    Calcium 09/16/2017 9.8  8.3 - 10.6 mg/dL Final    Total Protein 09/16/2017 7.8  6.4 - 8.2 g/dL Final    Alb 09/16/2017 4.0  3.4 - 5.0 g/dL Final    Albumin/Globulin Ratio 09/16/2017 1.1  1.1 - 2.2 Final    Total Bilirubin 09/16/2017 <0.2  0.0 - 1.0 mg/dL Final    Alkaline Phosphatase 09/16/2017 102  40 - 129 U/L Final    ALT 09/16/2017 17  10 - 40 U/L Final    AST 09/16/2017 16  15 - 37 U/L Final    Globulin 09/16/2017 3.8  g/dL Final    Specimen Status 09/16/2017 KIMMY   Final    Ventricular Rate 09/17/2017 57  BPM Final    Atrial Rate 09/17/2017 57  BPM Final    P-R Interval 09/17/2017 158  ms Final    QRS Duration 09/17/2017 72  ms Final    Q-T Interval 09/17/2017 428  ms Final    QTc Calculation (Bazett) 09/17/2017 416  ms Final    P Axis 09/17/2017 23  degrees Final    R Axis 09/17/2017 -36  degrees Final    T Axis 09/17/2017 34  degrees Final    Diagnosis 09/17/2017    Final                    Value:Sinus bradycardia  Left axis deviation  Abnormal ECG  When compared with ECG of 05-JUL-2017 15:21,  No significant change was found  Conf[facility], KAREEM ([id number]) on 9/17/2017 3:17:06 PM      Hemoglobin A1C 09/16/2017 11.7  See comment % Final    eAG 09/16/2017 289.1  mg/dL Final    POC Glucose 09/17/2017 306* 70 - 99 mg/dl Final    Performed on 09/17/2017 ACCU-CHEK   Final    POC Glucose 09/17/2017 218* 70 - 99 mg/dl Final    Performed on 09/17/2017 ACCU-CHEK   Final    POC Glucose 09/17/2017 234* 70 - 99 mg/dl Final    Performed on 09/17/2017 ACCU-CHEK   Final    Sodium 09/18/2017 138  136 - 145 mmol/L Final    Potassium 09/18/2017 5.0  3.5 - 5.1 mmol/L Final    Chloride 09/18/2017 101  99 - 110 mmol/L Final    CO2 09/18/2017 22  21 - 32 mmol/L Final    Anion Gap 09/18/2017 15  3 - 16 Final    Glucose 09/18/2017 324* 70 - 99 mg/dL Final    BUN 09/18/2017 23* 7 - 20 mg/dL Final    CREATININE 09/18/2017 1.0  0.6 - 1.1 mg/dL Final    GFR Non- 09/18/2017 57* >60 Final    GFR  09/18/2017 >60  >60 Final    Calcium 09/18/2017 9.1  8.3 - 10.6 mg/dL Final    WBC 09/18/2017 8.3  4.0 - 11.0 K/uL Final    RBC 09/18/2017 3.95* 4.00 - 5.20 M/uL Final    Hemoglobin 09/18/2017 10.7* 12.0 - 16.0 g/dL Final    Hematocrit 09/18/2017 33.1* 36.0 - 48.0 % Final    MCV 09/18/2017 83.9  80.0 - 100.0 fL Final    MCH 09/18/2017 27.1  26.0 - 34.0 pg Final    MCHC 09/18/2017 32.4  31.0 - 36.0 g/dL Final    RDW 09/18/2017 14.3  12.4 - 15.4 % Final    Platelets 23/77/0676 160  135 - 450 K/uL Final    MPV 09/18/2017 9.7  5.0 - 10.5 fL Final    POC Glucose 09/17/2017 311* 70 - 99 mg/dl Final    Performed on 09/17/2017 ACCU-CHEK   Final    POC Glucose 09/18/2017 277* 70 - 99 mg/dl Final    Performed on 09/18/2017 ACCU-CHEK   Final    POC Glucose 09/18/2017 453* 70 - 99 mg/dl Final    Performed on 09/18/2017 ACCU-CHEK   Final    POC Glucose 09/18/2017 368* 70 - 99 mg/dl Final    Performed on 09/18/2017 ACCU-CHEK   Final    POC Glucose 09/18/2017 339* 70 - 99 mg/dl Final    Performed on 09/18/2017 ACCU-CHEK   Final    POC Glucose 09/18/2017 285* 70 - 99 mg/dl Final    Performed on 09/18/2017 ACCU-CHEK   Final    POC Glucose 09/19/2017 153* 70 - 99 mg/dl Final    Performed on 09/19/2017 ACCU-CHEK   Final    POC Glucose 09/19/2017 257* 70 - 99 mg/dl Final    long-term current use of insulin ([address]    Uncontrolled type 2 diabetes mellitus with microalbuminuria, with long-term current use of insulin (HCC)    Essential hypertension          1: Type 2 DM complicated with nephropathy, neuropathy TIAs, carotid stenosis   Uncontrolled A1C 8.1 June 2018 <<  8% << 11.7%    A1C of <8 would be acceptable because of microvascular and macrovascular complications. C/w Metformin 1000mg bid   Victoza 1.8mg in am   C/w Basaglar to 30 units at bedtime   C/we Novolog to 15 units with meals and 5 units with snacks. No correction scale. All instructions provided in written. Check Blood sugars 4 times per day. Log them along with insulin and send them every 2 weeks. Call for blood sugars less than 60 or more than 400. Eye exam: Last exam in 2016, Needs an exam now. March 12th 2018. Foot exam:  Dec 2017   Deformity/amputation: absent  Skin lesions/pre-ulcerative calluses: absent  Edema: right- negative, left- negative    Sensory exam: Monofilament sensation: normal  Plus at least one of the following:   Pulses: normal,   Vibration (128 Hz): Moderately decreased     Renal screen: High 68 Feb 2018. Counselled for smoking cessation   TSH screen: Feb 2018   Saw CDE in 2016 with Rd.     2: HTN   Controlled     3: Hyperlipidemia   LDL: 31 , HDL: 47 , TGs: 125 July 2017    Simvastatin 20mg     Labs today: None, will do A1C on next visit     RTC in 6 weeks and bring in logs     EDUCATION:   Greater than 50% of this follow-up visit was spent in general counseling regarding   obesity, diet, exercise, importance of adherence to insulin regime, recognition and treatment of hypo and hyperglycemia,  glucose logging, proper diabetes management, diabetic complications with poor management and the importance of glycemic control in order to avoid the complications of diabetes. Risks and potential complications of diabetes were reviewed with the patient.   Diabetes health maintenance plan and

## 2018-09-07 ENCOUNTER — TELEPHONE (OUTPATIENT)
Dept: ENDOCRINOLOGY | Age: 58
End: 2018-09-07

## 2018-09-17 ENCOUNTER — TELEPHONE (OUTPATIENT)
Dept: ENDOCRINOLOGY | Age: 58
End: 2018-09-17

## 2018-09-17 NOTE — TELEPHONE ENCOUNTER
Leighann from [facility] called [phone], said pt is not covered for Novolog Flex pen, they would like the info for medical necessity resubmitted, the patient says she is blind and cannot use vials needs pens, the info can be faxed to [phone]

## 2018-09-20 NOTE — TELEPHONE ENCOUNTER
Called care giver John Rees back to discuss Mrs. Damon Anger. Tyshawn Botello has denied coverage for the insulin pen. Request does not meet medicaid medical necessity. Her voicemail is full- unable to leave her a message.

## 2018-09-21 ENCOUNTER — TELEPHONE (OUTPATIENT)
Dept: ENDOCRINOLOGY | Age: 58
End: 2018-09-21

## 2018-09-21 DIAGNOSIS — E11.22 TYPE 2 DIABETES MELLITUS WITH CHRONIC KIDNEY DISEASE, WITH LONG-TERM CURRENT USE OF INSULIN, UNSPECIFIED CKD STAGE (HCC): Primary | ICD-10-CM

## 2018-09-21 DIAGNOSIS — Z79.4 TYPE 2 DIABETES MELLITUS WITH CHRONIC KIDNEY DISEASE, WITH LONG-TERM CURRENT USE OF INSULIN, UNSPECIFIED CKD STAGE (HCC): Primary | ICD-10-CM

## 2018-09-21 NOTE — TELEPHONE ENCOUNTER
Elizabeth Guardado called to see if an Rx of admelog vial and syringe needles can be sent to [facility] pharmacy

## 2018-09-24 RX ORDER — IBUPROFEN 200 MG
1 TABLET ORAL DAILY
Qty: 100 EACH | Refills: 3 | Status: CANCELLED | OUTPATIENT
Start: 2018-09-24

## 2018-09-24 RX ORDER — CALCIUM CARB/VITAMIN D3/VIT K1 500-100-40
TABLET,CHEWABLE ORAL
Qty: 100 SYRINGE | Refills: 6 | Status: SHIPPED | OUTPATIENT
Start: 2018-09-24 | End: 2019-06-26 | Stop reason: SDUPTHER

## 2018-09-24 NOTE — TELEPHONE ENCOUNTER
Liat Larios informed RX was sent to pharmacy. Liat Larios also stated Zoila Eneida Gonzalez has not been noting her BS and has just started this week.

## 2018-09-28 ENCOUNTER — TELEPHONE (OUTPATIENT)
Dept: ENDOCRINOLOGY | Age: 58
End: 2018-09-28

## 2018-10-23 ENCOUNTER — OFFICE VISIT (OUTPATIENT)
Dept: ENDOCRINOLOGY | Age: 58
End: 2018-10-23
Payer: MEDICAID

## 2018-10-23 VITALS
DIASTOLIC BLOOD PRESSURE: 77 MMHG | BODY MASS INDEX: 34.91 KG/M2 | HEART RATE: 90 BPM | HEIGHT: 63 IN | OXYGEN SATURATION: 98 % | SYSTOLIC BLOOD PRESSURE: 129 MMHG | WEIGHT: 197 LBS

## 2018-10-23 DIAGNOSIS — E11.59 TYPE 2 DIABETES MELLITUS WITH VASCULAR DISEASE (HCC): ICD-10-CM

## 2018-10-23 DIAGNOSIS — E11.42 DIABETIC POLYNEUROPATHY ASSOCIATED WITH TYPE 2 DIABETES MELLITUS (HCC): Primary | ICD-10-CM

## 2018-10-23 DIAGNOSIS — E11.69 DYSLIPIDEMIA ASSOCIATED WITH TYPE 2 DIABETES MELLITUS (HCC): ICD-10-CM

## 2018-10-23 DIAGNOSIS — E78.5 DYSLIPIDEMIA ASSOCIATED WITH TYPE 2 DIABETES MELLITUS (HCC): ICD-10-CM

## 2018-10-23 DIAGNOSIS — I10 ESSENTIAL HYPERTENSION: ICD-10-CM

## 2018-10-23 PROCEDURE — 99214 OFFICE O/P EST MOD 30 MIN: CPT | Performed by: INTERNAL MEDICINE

## 2018-10-23 PROCEDURE — 3045F PR MOST RECENT HEMOGLOBIN A1C LEVEL 7.0-9.0%: CPT | Performed by: INTERNAL MEDICINE

## 2018-10-23 PROCEDURE — 2022F DILAT RTA XM EVC RTNOPTHY: CPT | Performed by: INTERNAL MEDICINE

## 2018-10-23 PROCEDURE — 1036F TOBACCO NON-USER: CPT | Performed by: INTERNAL MEDICINE

## 2018-10-23 PROCEDURE — 3014F SCREEN MAMMO DOC REV: CPT | Performed by: INTERNAL MEDICINE

## 2018-10-23 PROCEDURE — G8598 ASA/ANTIPLAT THER USED: HCPCS | Performed by: INTERNAL MEDICINE

## 2018-10-23 PROCEDURE — G8427 DOCREV CUR MEDS BY ELIG CLIN: HCPCS | Performed by: INTERNAL MEDICINE

## 2018-10-23 PROCEDURE — G8417 CALC BMI ABV UP PARAM F/U: HCPCS | Performed by: INTERNAL MEDICINE

## 2018-10-23 PROCEDURE — G8484 FLU IMMUNIZE NO ADMIN: HCPCS | Performed by: INTERNAL MEDICINE

## 2018-10-23 PROCEDURE — 3017F COLORECTAL CA SCREEN DOC REV: CPT | Performed by: INTERNAL MEDICINE

## 2018-10-23 NOTE — PROGRESS NOTES
Abnormal brain MRI 7/20/2017    Partially empty sella and minimal chronic small vessel ischemic disease    Acute bilateral low back pain without sciatica 11/2/2016    SHARRON (acute kidney injury) ([facility] [address] 7/5/2017    Arthritis     back    Bipolar disorder ([facility] [address] 10/18/2008    Cancer ([facility] 2015    bilateral breast:s/p lumpectomy/radiation:under care care of breast specialist:Dr. Boone     Carotid stenosis, bilateral:<50%:per [place] 7/2016 7/15/2016    Carpal tunnel syndrome 10/18/2008    Cervical cancer screening 2014    Nml per pt'.  DDD (degenerative disc disease), lumbar 7/18/2018    Depression     under care of pschiatrist:Dr. Marky Hudson Depression/anxiety 7/5/2017    Diabetes mellitus ([facility]     Gout     History of mammogram 10/28/2016;8/14/17    Negative    Hyperlipidemia     Hypertension     Microalbuminuria 7/1/2016    Neuropathy in diabetes ([facility]     Non morbid obesity 7/1/2016    Pancreatitis 5/12/16    [facility] hospitalization 5/12/16-5/16/16:under care of GI:chronic pancreatitis    S/P endoscopy 6/14/2016    [facility]:per pt' & her family member was nml.  Scoliosis     Spondylosis of lumbar region without myelopathy or radiculopathy 3/10/2017    Transient cerebral ischemia 07/15/2016    TIA:7/10/16    Unspecified cerebral artery occlusion with cerebral infarction     TIA       Past Surgical History:   Procedure Laterality Date    BREAST LUMPECTOMY  2015    Bilateral:breast cancer    HYSTERECTOMY      Benign:no cervical cancer per pt'    KIDNEY REMOVAL      right    OTHER SURGICAL HISTORY Right     orif right ankle    TUBAL LIGATION           Allergies   Allergen Reactions    Morphine Anaphylaxis     feels like throat is closing    Codeine Hives    Penicillins Hives         Current Outpatient Prescriptions:     insulin lispro (HUMALOG) 100 UNIT/ML pen, 15 units tidac, 5 units for snacks.  Max 60 units per day., Disp: 5 pen, Rfl: 3    insulin lispro (HUMALOG KWIKPEN) 200 UNIT/ML SOPN pen, Lot # [id number] Ex 8/2020, Disp: 2 pen, Rfl: 0    Insulin Syringe-Needle U-100 (INSULIN SYRINGE .3CC/31GX5/16\") 31G X 5/16\" 0.3 ML MISC, tidac for Lispro, Disp: 100 Syringe, Rfl: 6    Insulin Pen Needle 32G X 4 MM MISC, 5 each by Does not apply route daily, Disp: 150 each, Rfl: 11    clonazePAM (KLONOPIN) 1 MG tablet, Take 1 mg by mouth as needed. ., Disp: , Rfl:     lisinopril (PRINIVIL;ZESTRIL) 10 MG tablet, TAKE 1 TABLET BY MOUTH DAILY, Disp: 30 tablet, Rfl: 0    Liraglutide (VICTOZA) 18 MG/3ML SOPN SC injection, Inject 1.8 mg into the skin daily, Disp: 9 pen, Rfl: 3    metFORMIN (GLUCOPHAGE) 1000 MG tablet, TAKE 1 TABLET WITH MEALS TWICE A DAY, Disp: 60 tablet, Rfl: 0    B-D INS SYR ULTRAFINE 1CC/31G 31G X 5/16\" 1 ML MISC, USE THREE TIMES DAILY, Disp: , Rfl: 3    paliperidone (INVEGA) 9 MG extended release tablet, Take 9 mg by mouth every morning , Disp: , Rfl:     simvastatin (ZOCOR) 20 MG tablet, Take 20 mg by mouth nightly , Disp: , Rfl:     aspirin 81 MG tablet, Take 81 mg by mouth daily, Disp: , Rfl:     insulin glargine (BASAGLAR KWIKPEN) 100 UNIT/ML injection pen, Inject 40 Units into the skin nightly (Patient taking differently: Inject 30 Units into the skin nightly ), Disp: 5 pen, Rfl: 1    gabapentin (NEURONTIN) 600 MG tablet, Take 600 mg by mouth 3 times daily, Disp: , Rfl:     oxyCODONE-acetaminophen (PERCOCET) 5-325 MG per tablet, Take 1 tablet by mouth every 6 hours as needed for Pain.  ., Disp: , Rfl:     cyclobenzaprine (FLEXERIL) 5 MG tablet, TAKE 1 TABLET BY MOUTH EVERY 8 HOURS AS NEEDED FOR MUSCLE SPASMS, Disp: 30 tablet, Rfl: 0    TRUE METRIX BLOOD GLUCOSE TEST strip, USE AS DIRECTED TO TEST 4 TIMES A DAY, Disp: 100 strip, Rfl: 3    traZODone (DESYREL) 100 MG tablet, Take 100 mg by mouth nightly, Disp: , Rfl:     omeprazole (PRILOSEC) 20 MG capsule, Take 1 capsule by mouth daily, Disp: 30 capsule, Rfl: 0    glucagon 1 MG injection, Inject 1 mg into the skin See Admin Instructions 15 units tidac, 5 units for snacks. Max 60 units per day. -     Hemoglobin A1C; Future  -     insulin lispro (HUMALOG KWIKPEN) 200 UNIT/ML SOPN pen; Lot # [id number] Ex 8/2020    Uncontrolled type 2 diabetes mellitus with diabetic nephropathy, with long-term current use of insulin ([facility])  -     Comprehensive Metabolic Panel; Future  -     Microalbumin / Creatinine Urine Ratio; Future  -     insulin lispro (HUMALOG KWIKPEN) 200 UNIT/ML SOPN pen; Lot # [id number] Ex 8/2020    Uncontrolled type 2 diabetes mellitus with microalbuminuria, with long-term current use of insulin ([facility])    Essential hypertension          1: Type 2 DM complicated with nephropathy, neuropathy TIAs, carotid stenosis   Uncontrolled A1C 8.1 June 2018 <<  8% << 11.7%    A1C of <8 would be acceptable because of microvascular and macrovascular complications. She is having wide range of blood sugars   Since she is on Admelog vial she is having severe hypoglycemias because of different doses  Poor dexterity, neuropathy, history of stroke , will send admelog pens       C/w Metformin 1000mg bid   Victoza 1.8mg in am   C/w Basaglar 30 units at bedtime   C/w Admelog 15 units with meals and 5 units with snacks. No correction scale. All instructions provided in written. Check Blood sugars 4 times per day. Log them along with insulin and send them every 2 weeks. Call for blood sugars less than 60 or more than 400. Eye exam: Last exam in 2016, Needs an exam now. March 12th 2018. Foot exam:  Dec 2017   Deformity/amputation: absent  Skin lesions/pre-ulcerative calluses: absent  Edema: right- negative, left- negative    Sensory exam: Monofilament sensation: normal  Plus at least one of the following:   Pulses: normal,   Vibration (128 Hz): Moderately decreased     Renal screen: High 68 Feb 2018.    Counselled for smoking cessation   TSH screen: Feb 2018   Saw CDE in 2016 with Rd.     2: HTN   Controlled     3: Hyperlipidemia   LDL: 31 , HDL: 47 ,

## 2019-01-18 ENCOUNTER — HOSPITAL ENCOUNTER (OUTPATIENT)
Age: 59
Discharge: HOME | End: 2019-01-18
Payer: MEDICAID

## 2019-01-18 LAB
CHOLESTEROL, TOTAL: 116 MG/DL (ref 0–199)
CREATININE URINE: 57 MG/DL (ref 28–259)
HDLC SERPL-MCNC: 45 MG/DL (ref 40–60)
LDL CHOLESTEROL CALCULATED: 45 MG/DL
MICROALBUMIN UR-MCNC: 2.7 MG/DL
MICROALBUMIN/CREAT UR-RTO: 47.4 MG/G (ref 0–30)
TRIGL SERPL-MCNC: 130 MG/DL (ref 0–150)
VLDLC SERPL CALC-MCNC: 26 MG/DL

## 2019-01-18 PROCEDURE — 83036 HEMOGLOBIN GLYCOSYLATED A1C: CPT

## 2019-01-18 PROCEDURE — 82043 UR ALBUMIN QUANTITATIVE: CPT

## 2019-01-18 PROCEDURE — 82570 ASSAY OF URINE CREATININE: CPT

## 2019-01-18 PROCEDURE — 36415 COLL VENOUS BLD VENIPUNCTURE: CPT

## 2019-01-18 PROCEDURE — 80061 LIPID PANEL: CPT

## 2019-01-18 PROCEDURE — 80053 COMPREHEN METABOLIC PANEL: CPT

## 2019-01-19 LAB
A/G RATIO: 1 (ref 1.1–2.2)
ALBUMIN SERPL-MCNC: 4.3 G/DL (ref 3.4–5)
ALP BLD-CCNC: 144 U/L (ref 40–129)
ALT SERPL-CCNC: 32 U/L (ref 10–40)
ANION GAP SERPL CALCULATED.3IONS-SCNC: 17 MMOL/L (ref 3–16)
AST SERPL-CCNC: 22 U/L (ref 15–37)
BILIRUB SERPL-MCNC: <0.2 MG/DL (ref 0–1)
BUN BLDV-MCNC: 21 MG/DL (ref 7–20)
CALCIUM SERPL-MCNC: 11.2 MG/DL (ref 8.3–10.6)
CHLORIDE BLD-SCNC: 96 MMOL/L (ref 99–110)
CO2: 25 MMOL/L (ref 21–32)
CREAT SERPL-MCNC: 1.1 MG/DL (ref 0.6–1.1)
ESTIMATED AVERAGE GLUCOSE: 243.2 MG/DL
GFR AFRICAN AMERICAN: >60
GFR NON-AFRICAN AMERICAN: 51
GLOBULIN: 4.2 G/DL
GLUCOSE BLD-MCNC: 361 MG/DL (ref 70–99)
HBA1C MFR BLD: 10.1 %
POTASSIUM SERPL-SCNC: 4.2 MMOL/L (ref 3.5–5.1)
SODIUM BLD-SCNC: 138 MMOL/L (ref 136–145)
TOTAL PROTEIN: 8.5 G/DL (ref 6.4–8.2)

## 2019-01-24 ENCOUNTER — TELEPHONE (OUTPATIENT)
Dept: ENDOCRINOLOGY | Age: 59
End: 2019-01-24

## 2019-01-30 ENCOUNTER — OFFICE VISIT (OUTPATIENT)
Dept: ENDOCRINOLOGY | Age: 59
End: 2019-01-30
Payer: MEDICAID

## 2019-01-30 VITALS
WEIGHT: 191 LBS | DIASTOLIC BLOOD PRESSURE: 83 MMHG | BODY MASS INDEX: 33.84 KG/M2 | HEIGHT: 63 IN | OXYGEN SATURATION: 100 % | HEART RATE: 88 BPM | SYSTOLIC BLOOD PRESSURE: 136 MMHG

## 2019-01-30 DIAGNOSIS — I10 ESSENTIAL HYPERTENSION: Chronic | ICD-10-CM

## 2019-01-30 DIAGNOSIS — E78.5 DYSLIPIDEMIA ASSOCIATED WITH TYPE 2 DIABETES MELLITUS (HCC): ICD-10-CM

## 2019-01-30 DIAGNOSIS — E11.59 TYPE 2 DIABETES MELLITUS WITH VASCULAR DISEASE (HCC): ICD-10-CM

## 2019-01-30 DIAGNOSIS — Z79.4 INSULIN LONG-TERM USE (HCC): ICD-10-CM

## 2019-01-30 DIAGNOSIS — E11.69 DYSLIPIDEMIA ASSOCIATED WITH TYPE 2 DIABETES MELLITUS (HCC): ICD-10-CM

## 2019-01-30 DIAGNOSIS — E11.42 DIABETIC POLYNEUROPATHY ASSOCIATED WITH TYPE 2 DIABETES MELLITUS (HCC): Primary | ICD-10-CM

## 2019-01-30 PROCEDURE — G8484 FLU IMMUNIZE NO ADMIN: HCPCS | Performed by: INTERNAL MEDICINE

## 2019-01-30 PROCEDURE — 2022F DILAT RTA XM EVC RTNOPTHY: CPT | Performed by: INTERNAL MEDICINE

## 2019-01-30 PROCEDURE — 1036F TOBACCO NON-USER: CPT | Performed by: INTERNAL MEDICINE

## 2019-01-30 PROCEDURE — 3046F HEMOGLOBIN A1C LEVEL >9.0%: CPT | Performed by: INTERNAL MEDICINE

## 2019-01-30 PROCEDURE — G8427 DOCREV CUR MEDS BY ELIG CLIN: HCPCS | Performed by: INTERNAL MEDICINE

## 2019-01-30 PROCEDURE — G8417 CALC BMI ABV UP PARAM F/U: HCPCS | Performed by: INTERNAL MEDICINE

## 2019-01-30 PROCEDURE — G8598 ASA/ANTIPLAT THER USED: HCPCS | Performed by: INTERNAL MEDICINE

## 2019-01-30 PROCEDURE — 3017F COLORECTAL CA SCREEN DOC REV: CPT | Performed by: INTERNAL MEDICINE

## 2019-01-30 PROCEDURE — 99214 OFFICE O/P EST MOD 30 MIN: CPT | Performed by: INTERNAL MEDICINE

## 2019-02-12 RX ORDER — CALCIUM CITRATE/VITAMIN D3 200MG-6.25
TABLET ORAL
Qty: 100 STRIP | Refills: 3 | Status: SHIPPED | OUTPATIENT
Start: 2019-02-12 | End: 2019-06-03 | Stop reason: SDUPTHER

## 2019-02-19 ENCOUNTER — OFFICE VISIT (OUTPATIENT)
Dept: ENDOCRINOLOGY | Age: 59
End: 2019-02-19
Payer: MEDICAID

## 2019-02-19 VITALS
DIASTOLIC BLOOD PRESSURE: 68 MMHG | HEART RATE: 106 BPM | SYSTOLIC BLOOD PRESSURE: 130 MMHG | OXYGEN SATURATION: 97 % | BODY MASS INDEX: 33.13 KG/M2 | WEIGHT: 187 LBS | HEIGHT: 63 IN

## 2019-02-19 DIAGNOSIS — E66.01 MORBID OBESITY DUE TO EXCESS CALORIES (HCC): ICD-10-CM

## 2019-02-19 DIAGNOSIS — E11.69 DYSLIPIDEMIA ASSOCIATED WITH TYPE 2 DIABETES MELLITUS (HCC): ICD-10-CM

## 2019-02-19 DIAGNOSIS — E78.5 DYSLIPIDEMIA ASSOCIATED WITH TYPE 2 DIABETES MELLITUS (HCC): ICD-10-CM

## 2019-02-19 DIAGNOSIS — E11.59 TYPE 2 DIABETES MELLITUS WITH VASCULAR DISEASE (HCC): ICD-10-CM

## 2019-02-19 DIAGNOSIS — E11.42 DIABETIC POLYNEUROPATHY ASSOCIATED WITH TYPE 2 DIABETES MELLITUS (HCC): ICD-10-CM

## 2019-02-19 DIAGNOSIS — I10 ESSENTIAL HYPERTENSION: ICD-10-CM

## 2019-02-19 PROCEDURE — 2022F DILAT RTA XM EVC RTNOPTHY: CPT | Performed by: INTERNAL MEDICINE

## 2019-02-19 PROCEDURE — 3017F COLORECTAL CA SCREEN DOC REV: CPT | Performed by: INTERNAL MEDICINE

## 2019-02-19 PROCEDURE — G8427 DOCREV CUR MEDS BY ELIG CLIN: HCPCS | Performed by: INTERNAL MEDICINE

## 2019-02-19 PROCEDURE — G8484 FLU IMMUNIZE NO ADMIN: HCPCS | Performed by: INTERNAL MEDICINE

## 2019-02-19 PROCEDURE — G8417 CALC BMI ABV UP PARAM F/U: HCPCS | Performed by: INTERNAL MEDICINE

## 2019-02-19 PROCEDURE — 1036F TOBACCO NON-USER: CPT | Performed by: INTERNAL MEDICINE

## 2019-02-19 PROCEDURE — 99214 OFFICE O/P EST MOD 30 MIN: CPT | Performed by: INTERNAL MEDICINE

## 2019-02-19 PROCEDURE — G8598 ASA/ANTIPLAT THER USED: HCPCS | Performed by: INTERNAL MEDICINE

## 2019-02-19 PROCEDURE — 3046F HEMOGLOBIN A1C LEVEL >9.0%: CPT | Performed by: INTERNAL MEDICINE

## 2019-02-19 RX ORDER — LETROZOLE 2.5 MG/1
TABLET, FILM COATED ORAL
Refills: 3 | Status: ON HOLD | COMMUNITY
Start: 2019-02-01 | End: 2021-02-05 | Stop reason: HOSPADM

## 2019-02-19 RX ORDER — SUB-Q INSULIN DEVICE, 40 UNIT
EACH MISCELLANEOUS
Qty: 30 KIT | Refills: 1 | Status: SHIPPED | OUTPATIENT
Start: 2019-02-19 | End: 2019-04-25 | Stop reason: SDUPTHER

## 2019-02-19 RX ORDER — FLASH GLUCOSE SENSOR
KIT MISCELLANEOUS
Qty: 2 EACH | Refills: 5 | Status: SHIPPED | OUTPATIENT
Start: 2019-02-19 | End: 2019-03-19 | Stop reason: ALTCHOICE

## 2019-02-19 RX ORDER — CALCIUM CARBONATE/VITAMIN D3 600 MG-10
TABLET ORAL DAILY
Refills: 11 | COMMUNITY
Start: 2019-02-10 | End: 2020-03-06

## 2019-02-19 RX ORDER — FLASH GLUCOSE SCANNING READER
EACH MISCELLANEOUS
Qty: 1 DEVICE | Refills: 0 | Status: SHIPPED | OUTPATIENT
Start: 2019-02-19 | End: 2019-03-19 | Stop reason: ALTCHOICE

## 2019-02-22 ENCOUNTER — TELEPHONE (OUTPATIENT)
Dept: ENDOCRINOLOGY | Age: 59
End: 2019-02-22

## 2019-02-27 ENCOUNTER — TELEPHONE (OUTPATIENT)
Dept: ENDOCRINOLOGY | Age: 59
End: 2019-02-27

## 2019-02-28 ENCOUNTER — TELEPHONE (OUTPATIENT)
Dept: ENDOCRINOLOGY | Age: 59
End: 2019-02-28

## 2019-03-04 ENCOUNTER — TELEPHONE (OUTPATIENT)
Dept: ENDOCRINOLOGY | Age: 59
End: 2019-03-04

## 2019-03-05 ENCOUNTER — TELEPHONE (OUTPATIENT)
Dept: ENDOCRINOLOGY | Age: 59
End: 2019-03-05

## 2019-03-13 ENCOUNTER — TELEPHONE (OUTPATIENT)
Dept: ENDOCRINOLOGY | Age: 59
End: 2019-03-13

## 2019-03-19 ENCOUNTER — OFFICE VISIT (OUTPATIENT)
Dept: ENDOCRINOLOGY | Age: 59
End: 2019-03-19
Payer: MEDICAID

## 2019-03-19 VITALS
DIASTOLIC BLOOD PRESSURE: 72 MMHG | SYSTOLIC BLOOD PRESSURE: 140 MMHG | WEIGHT: 190 LBS | HEART RATE: 94 BPM | BODY MASS INDEX: 33.66 KG/M2 | OXYGEN SATURATION: 99 %

## 2019-03-19 DIAGNOSIS — E11.69 DYSLIPIDEMIA ASSOCIATED WITH TYPE 2 DIABETES MELLITUS (HCC): ICD-10-CM

## 2019-03-19 DIAGNOSIS — E11.42 DIABETIC POLYNEUROPATHY ASSOCIATED WITH TYPE 2 DIABETES MELLITUS (HCC): ICD-10-CM

## 2019-03-19 DIAGNOSIS — I10 ESSENTIAL HYPERTENSION: Chronic | ICD-10-CM

## 2019-03-19 DIAGNOSIS — E78.5 DYSLIPIDEMIA ASSOCIATED WITH TYPE 2 DIABETES MELLITUS (HCC): ICD-10-CM

## 2019-03-19 DIAGNOSIS — E11.59 TYPE 2 DIABETES MELLITUS WITH VASCULAR DISEASE (HCC): ICD-10-CM

## 2019-03-19 PROCEDURE — 3017F COLORECTAL CA SCREEN DOC REV: CPT | Performed by: INTERNAL MEDICINE

## 2019-03-19 PROCEDURE — G8427 DOCREV CUR MEDS BY ELIG CLIN: HCPCS | Performed by: INTERNAL MEDICINE

## 2019-03-19 PROCEDURE — 99214 OFFICE O/P EST MOD 30 MIN: CPT | Performed by: INTERNAL MEDICINE

## 2019-03-19 PROCEDURE — G8417 CALC BMI ABV UP PARAM F/U: HCPCS | Performed by: INTERNAL MEDICINE

## 2019-03-19 PROCEDURE — 2022F DILAT RTA XM EVC RTNOPTHY: CPT | Performed by: INTERNAL MEDICINE

## 2019-03-19 PROCEDURE — G8484 FLU IMMUNIZE NO ADMIN: HCPCS | Performed by: INTERNAL MEDICINE

## 2019-03-19 PROCEDURE — G8598 ASA/ANTIPLAT THER USED: HCPCS | Performed by: INTERNAL MEDICINE

## 2019-03-19 PROCEDURE — 3046F HEMOGLOBIN A1C LEVEL >9.0%: CPT | Performed by: INTERNAL MEDICINE

## 2019-03-19 PROCEDURE — 1036F TOBACCO NON-USER: CPT | Performed by: INTERNAL MEDICINE

## 2019-03-19 RX ORDER — SUB-Q INSULIN DEVICE, 40 UNIT
EACH MISCELLANEOUS
COMMUNITY
End: 2019-04-25

## 2019-04-02 NOTE — TELEPHONE ENCOUNTER
Hilda Green called to see if Dr. Kamar Clemons could send a new Rx of Ademlog Vials to [facility] in [place] and have the signature say up to 100 IU per day. To take care of the leakage they get from the pump.

## 2019-04-03 ENCOUNTER — TELEPHONE (OUTPATIENT)
Dept: ENDOCRINOLOGY | Age: 59
End: 2019-04-03

## 2019-04-24 DIAGNOSIS — E11.59 TYPE 2 DIABETES MELLITUS WITH VASCULAR DISEASE (HCC): ICD-10-CM

## 2019-04-25 ENCOUNTER — TELEPHONE (OUTPATIENT)
Dept: ENDOCRINOLOGY | Age: 59
End: 2019-04-25

## 2019-04-25 DIAGNOSIS — E11.59 TYPE 2 DIABETES MELLITUS WITH VASCULAR DISEASE (HCC): ICD-10-CM

## 2019-04-25 RX ORDER — SUB-Q INSULIN DEVICE, 40 UNIT
EACH MISCELLANEOUS
Qty: 30 KIT | Refills: 0 | Status: SHIPPED | OUTPATIENT
Start: 2019-04-25 | End: 2019-04-30 | Stop reason: SDUPTHER

## 2019-04-25 NOTE — TELEPHONE ENCOUNTER
LOV: 3/19/19  NOV: 4/30/19    Medication: V-GO Pump  Dose:   Sig:  Does not apply route  Route: Does not apply route  Quantity: 1

## 2019-04-25 NOTE — TELEPHONE ENCOUNTER
Pt called to get a refill of the V-Go pump sent to [facility] on [address] AT [address]. Lucius Horton called with the same information but explained that the V-Go is still leaking even with the stopper. The pump that she has isn't going to last. Lucius Siddiqui is more concerned that she won't have a pump to take her insulin and it's not going to last. She will also be out of the office so she would like to get this figured out today if possible. Informed natalie that they will get a call back from nurse.

## 2019-04-29 PROBLEM — H43.813 VITREOUS DEGENERATION, BILATERAL: Status: ACTIVE | Noted: 2019-04-29

## 2019-04-29 PROBLEM — H25.13 AGE-RELATED NUCLEAR CATARACT OF BOTH EYES: Status: ACTIVE | Noted: 2019-04-29

## 2019-04-29 PROBLEM — H35.033 HYPERTENSIVE RETINOPATHY, BILATERAL: Status: ACTIVE | Noted: 2019-04-29

## 2019-04-29 PROBLEM — H52.03 HYPERMETROPIA, BILATERAL: Status: ACTIVE | Noted: 2019-04-29

## 2019-04-30 ENCOUNTER — OFFICE VISIT (OUTPATIENT)
Dept: ENDOCRINOLOGY | Age: 59
End: 2019-04-30
Payer: MEDICAID

## 2019-04-30 VITALS
BODY MASS INDEX: 33.13 KG/M2 | DIASTOLIC BLOOD PRESSURE: 74 MMHG | OXYGEN SATURATION: 97 % | HEIGHT: 63 IN | SYSTOLIC BLOOD PRESSURE: 125 MMHG | WEIGHT: 187 LBS | HEART RATE: 78 BPM

## 2019-04-30 DIAGNOSIS — E66.01 MORBID OBESITY DUE TO EXCESS CALORIES (HCC): ICD-10-CM

## 2019-04-30 DIAGNOSIS — E11.69 DYSLIPIDEMIA ASSOCIATED WITH TYPE 2 DIABETES MELLITUS (HCC): Primary | ICD-10-CM

## 2019-04-30 DIAGNOSIS — I10 ESSENTIAL HYPERTENSION: Chronic | ICD-10-CM

## 2019-04-30 DIAGNOSIS — E78.5 DYSLIPIDEMIA ASSOCIATED WITH TYPE 2 DIABETES MELLITUS (HCC): Primary | ICD-10-CM

## 2019-04-30 DIAGNOSIS — E11.59 TYPE 2 DIABETES MELLITUS WITH VASCULAR DISEASE (HCC): ICD-10-CM

## 2019-04-30 DIAGNOSIS — E11.42 DIABETIC POLYNEUROPATHY ASSOCIATED WITH TYPE 2 DIABETES MELLITUS (HCC): ICD-10-CM

## 2019-04-30 PROCEDURE — 1036F TOBACCO NON-USER: CPT | Performed by: INTERNAL MEDICINE

## 2019-04-30 PROCEDURE — 99214 OFFICE O/P EST MOD 30 MIN: CPT | Performed by: INTERNAL MEDICINE

## 2019-04-30 PROCEDURE — G8598 ASA/ANTIPLAT THER USED: HCPCS | Performed by: INTERNAL MEDICINE

## 2019-04-30 PROCEDURE — G8417 CALC BMI ABV UP PARAM F/U: HCPCS | Performed by: INTERNAL MEDICINE

## 2019-04-30 PROCEDURE — 3017F COLORECTAL CA SCREEN DOC REV: CPT | Performed by: INTERNAL MEDICINE

## 2019-04-30 PROCEDURE — 2022F DILAT RTA XM EVC RTNOPTHY: CPT | Performed by: INTERNAL MEDICINE

## 2019-04-30 PROCEDURE — G8427 DOCREV CUR MEDS BY ELIG CLIN: HCPCS | Performed by: INTERNAL MEDICINE

## 2019-04-30 PROCEDURE — 3046F HEMOGLOBIN A1C LEVEL >9.0%: CPT | Performed by: INTERNAL MEDICINE

## 2019-04-30 RX ORDER — SUB-Q INSULIN DEVICE, 40 UNIT
EACH MISCELLANEOUS
Qty: 30 KIT | Refills: 5 | Status: SHIPPED | OUTPATIENT
Start: 2019-04-30 | End: 2019-08-20

## 2019-04-30 NOTE — PATIENT INSTRUCTIONS
Patient Education        Diabetes Foot Health: Care Instructions  Your Care Instructions    When you have diabetes, your feet need extra care and attention. Diabetes can damage the nerve endings and blood vessels in your feet, making you less likely to notice when your feet are injured. Diabetes also limits your body's ability to fight infection and get blood to areas that need it. If you get a minor foot injury, it could become an ulcer or a serious infection. With good foot care, you can prevent most of these problems. Caring for your feet can be quick and easy. Most of the care can be done when you are bathing or getting ready for bed. Follow-up care is a key part of your treatment and safety. Be sure to make and go to all appointments, and call your doctor if you are having problems. It's also a good idea to know your test results and keep a list of the medicines you take. How can you care for yourself at home? · Keep your blood sugar close to normal by watching what and how much you eat, monitoring blood sugar, taking medicines if prescribed, and getting regular exercise. · Do not smoke. Smoking affects blood flow and can make foot problems worse. If you need help quitting, talk to your doctor about stop-smoking programs and medicines. These can increase your chances of quitting for good. · Eat a diet that is low in fats. High fat intake can cause fat to build up in your blood vessels and decrease blood flow. · Inspect your feet daily for blisters, cuts, cracks, or sores. If you cannot see well, use a mirror or have someone help you. · Take care of your feet:  ? Wash your feet every day. Use warm (not hot) water. Check the water temperature with your wrists or other part of your body, not your feet. ? Dry your feet well. Pat them dry. Do not rub the skin on your feet too hard. Dry well between your toes.  If the skin on your feet stays moist, bacteria or a fungus can grow, which can lead to infection. ? Keep your skin soft. Use moisturizing skin cream to keep the skin on your feet soft and prevent calluses and cracks. But do not put the cream between your toes, and stop using any cream that causes a rash. ? Clean underneath your toenails carefully. Do not use a sharp object to clean underneath your toenails. Use the blunt end of a nail file or other rounded tool. ? Trim and file your toenails straight across to prevent ingrown toenails. Use a nail clipper, not scissors. Use an emery board to smooth the edges. · Change socks daily. Socks without seams are best, because seams often rub the feet. You can find socks for people with diabetes from specialty catalogs. · Look inside your shoes every day for things like gravel or torn linings, which could cause blisters or sores. · Buy shoes that fit well:  ? Look for shoes that have plenty of space around the toes. This helps prevent bunions and blisters. ? Try on shoes while wearing the kind of socks you will usually wear with the shoes. ? Avoid plastic shoes. They may rub your feet and cause blisters. Good shoes should be made of materials that are flexible and breathable, such as leather or cloth. ? Break in new shoes slowly by wearing them for no more than an hour a day for several days. Take extra time to check your feet for red areas, blisters, or other problems after you wear new shoes. · Do not go barefoot. Do not wear sandals, and do not wear shoes with very thin soles. Thin soles are easy to puncture. They also do not protect your feet from hot pavement or cold weather. · Have your doctor check your feet during each visit. If you have a foot problem, see your doctor. Do not try to treat an early foot problem at home. Home remedies or treatments that you can buy without a prescription (such as corn removers) can be harmful. · Always get early treatment for foot problems.  A minor irritation can lead to a major problem if not properly cared for early. When should you call for help? Call your doctor now or seek immediate medical care if:    · You have a foot sore, an ulcer or break in the skin that is not healing after 4 days, bleeding corns or calluses, or an ingrown toenail.     · You have blue or black areas, which can mean bruising or blood flow problems.     · You have peeling skin or tiny blisters between your toes or cracking or oozing of the skin.     · You have a fever for more than 24 hours and a foot sore.     · You have new numbness or tingling in your feet that does not go away after you move your feet or change positions.     · You have unexplained or unusual swelling of the foot or ankle.    Watch closely for changes in your health, and be sure to contact your doctor if:    · You cannot do proper foot care. Where can you learn more? Go to https://InEdgepepiceweb.[email or web address] org and sign in to your [employer] account. Enter A739 in the [employer] box to learn more about \"Diabetes Foot Health: Care Instructions. \"     If you do not have an account, please click on the \"Sign Up Now\" link. Current as of: July 25, 2018  Content Version: 11.9  © 3083-8492 [employer]. Care instructions adapted under license by [facility] ([facility]). If you have questions about a medical condition or this instruction, always ask your healthcare professional. [place] [address] any warranty or liability for your use of this information.

## 2019-04-30 NOTE — PROGRESS NOTES
Patient ID:   Derald Gilford is a 60 y.o. female  Chief Complaint:   Derald Gilford presents for an evaluation of Type 2 Diabetes Mellitus , Hyperlipidemia and hypertension. Here with god daughter   Subjective:   Type 2 Diabetes Mellitus diagnosed around 2010  On insulin since 2012  Previous regimen:Taking Admelog 15 units tidac and 5 units for snacks. Basaglar 40 units once a day at night. Metformin 1000mg bid   Victoza 1.2 instead of 1.8 mg daily in am   Because of poor adherence to medical plan, VGO was started   Using VGO 30 every night at 41QF  4 clicks for each meal and 2 snacks for the snacks   Uses VGO on thigh as it moves on her stomach     One vial last for 5-7 days as it leaks in the      Admits making poor dietary choices, not checking blood sugars , poor adherence overall   Multiple cancellations and usually late to appointments     Checks blood sugars 4 times per day. Reviewed/Reported. 49, 60,67,   Am:    Lunch:   Supper:    HS:     Hypoglycemias: Rarely     Meals: 3, dinner is big. Snacks (jellos, fruits, pretzels) after every meal because she is hungry. Exercise: None    Denies chest pain, exertional dyspnea. Family history of CAD: Both parents  Denies smoking/ alcohol.    Currently on  mg daily     The following portions of the patient's history were reviewed and updated as appropriate:       Family History   Problem Relation Age of Onset    Cancer Mother         breast    Cancer Father     Heart Failure Neg Hx     High Cholesterol Neg Hx     Hypertension Neg Hx     Migraines Neg Hx     Rashes/Skin Problems Neg Hx     Seizures Neg Hx     Stroke Neg Hx     Thyroid Disease Neg Hx     Diabetes Neg Hx          Social History     Socioeconomic History    Marital status:      Spouse name: Not on file    Number of children: Not on file    Years of education: Not on file    Highest education level: Not on file   Occupational History    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  Scoliosis     Spondylosis of lumbar region without myelopathy or radiculopathy 3/10/2017    Transient cerebral ischemia 07/15/2016    TIA:7/10/16    Unspecified cerebral artery occlusion with cerebral infarction     TIA       Past Surgical History:   Procedure Laterality Date    BREAST LUMPECTOMY  2015    Bilateral:breast cancer    HYSTERECTOMY      Benign:no cervical cancer per pt'    KIDNEY REMOVAL      right    OTHER SURGICAL HISTORY Right     orif right ankle    TUBAL LIGATION           Allergies   Allergen Reactions    Morphine Anaphylaxis and Hives     feels like throat is closing    Penicillins Hives and Swelling    Codeine Hives and Rash    Penicillin G Rash         Current Outpatient Medications:     insulin lispro (ADMELOG) 100 UNIT/ML injection vial, Use for VGO, up to 200 units per day. 30 days: 6 vials, Disp: 6 vial, Rfl: 2    Insulin Disposable Pump (V-GO 30) KIT, Use once a day.  One click for the snack and three clicks for meals, Disp: 30 kit, Rfl: 5    CALCIUM 600-D 600-400 MG-UNIT TABS, TAKE 1 TABLET BY MOUTH TWICE A DAY WITH FOOD, Disp: , Rfl: 11    letrozole (FEMARA) 2.5 MG tablet, TAKE 1 TABLET BY MOUTH EVERY DAY, Disp: , Rfl: 3    glucagon 1 MG injection, Inject 1 mg into the muscle See Admin Instructions Follow package directions for low blood sugar., Disp: 1 kit, Rfl: 0    TRUE METRIX BLOOD GLUCOSE TEST strip, USE AS DIRECTED TO TEST 4 TIMES A DAY, Disp: 100 strip, Rfl: 3    Insulin Pen Needle (UNIFINE PENTIPS) 32G X 4 MM MISC, 6 times daily for victoza and insulins. , Disp: 200 each, Rfl: 11    Insulin Syringe-Needle U-100 (INSULIN SYRINGE .3CC/31GX5/16\") 31G X 5/16\" 0.3 ML MISC, tidac for Lispro, Disp: 100 Syringe, Rfl: 6    clonazePAM (KLONOPIN) 1 MG tablet, Take 1 mg by mouth as needed. ., Disp: , Rfl:     lisinopril (PRINIVIL;ZESTRIL) 10 MG tablet, TAKE 1 TABLET BY MOUTH DAILY, Disp: 30 tablet, Rfl: 0    Liraglutide (VICTOZA) 18 MG/3ML SOPN SC injection, Inject 1.8 mg into the skin daily, Disp: 9 pen, Rfl: 3    metFORMIN (GLUCOPHAGE) 1000 MG tablet, TAKE 1 TABLET WITH MEALS TWICE A DAY, Disp: 60 tablet, Rfl: 0    B-D INS SYR ULTRAFINE 1CC/31G 31G X 5/16\" 1 ML MISC, USE THREE TIMES DAILY, Disp: , Rfl: 3    paliperidone (INVEGA) 9 MG extended release tablet, Take 9 mg by mouth every morning , Disp: , Rfl:     simvastatin (ZOCOR) 20 MG tablet, Take 20 mg by mouth nightly , Disp: , Rfl:     aspirin 81 MG tablet, Take 81 mg by mouth daily, Disp: , Rfl:     gabapentin (NEURONTIN) 600 MG tablet, Take 600 mg by mouth 3 times daily, Disp: , Rfl:     oxyCODONE-acetaminophen (PERCOCET) 5-325 MG per tablet, Take 1 tablet by mouth every 6 hours as needed for Pain. ., Disp: , Rfl:     traZODone (DESYREL) 100 MG tablet, Take 100 mg by mouth nightly, Disp: , Rfl:     omeprazole (PRILOSEC) 20 MG capsule, Take 1 capsule by mouth daily, Disp: 30 capsule, Rfl: 0      Review of Systems:    Constitutional: Negative for fever, chills, and unexpected weight change. HENT: Negative for congestion, ear pain, rhinorrhea,  sore throat and trouble swallowing. Eyes: Negative for photophobia, redness, itching. Respiratory: Negative for cough, shortness of breath and sputum. Cardiovascular: Negative for chest pain, palpitations and leg swelling. Gastrointestinal: Negative for nausea, vomiting, abdominal pain, diarrhea, constipation. Endocrine: Negative for cold intolerance, heat intolerance, polydipsia, polyphagia and polyuria. Genitourinary: Negative for dysuria, urgency, frequency, hematuria and flank pain. Musculoskeletal: Negative for myalgias, back pain, arthralgias and neck pain. Skin/Nail: Negative for rash, itching. Normal nails. Neurological: Negative for seizures, weakness, light-headedness, numbness and headaches. Hematological/ Lymph nodes: Negative for adenopathy. Does not bruise/bleed easily. Psychiatric/Behavioral: Negative for suicidal ideas, depression, anxiety, sleep disturbance and decreased concentration. Objective:   Physical Exam:  /74 (Site: Right Upper Arm, Position: Sitting, Cuff Size: Large Adult)   Pulse 78   Ht 5' 3\" (1.6 m)   Wt 187 lb (84.8 kg)   SpO2 97%   Breastfeeding? No   BMI 33.13 kg/m²   Constitutional: Patient is oriented to person, place, and time. Patient appears well-developed and well-nourished. HENT:    Head: Normocephalic and atraumatic. Eyes: Conjunctivae and EOM are normal. Pupils are equal, round, and reactive to light. Neck: Normal range of motion. Thyroid exam normal.   Cardiovascular: Normal rate, regular rhythm and normal heart sounds. Pulmonary/Chest: Effort normal and breath sounds normal.   Abdominal: Soft. Bowel sounds are normal.   Musculoskeletal: Normal range of motion. Neurological: Patient is alert and oriented to person, place, and time. Patient has normal reflexes. Skin: Skin is warm and dry. Psychiatric: Patient has a normal mood and affect.  Patient behavior is normal.     Lab Review:    Hospital Outpatient Visit on 01/18/2019   Component Date Value Ref Range Status    Hemoglobin A1C 01/18/2019 10.1  See comment % Final    eAG 01/18/2019 243.2  mg/dL Final    Cholesterol, Total 01/18/2019 116  0 - 199 mg/dL Final    Triglycerides 01/18/2019 130  0 - 150 mg/dL Final    HDL 01/18/2019 45  40 - 60 mg/dL Final    LDL Calculated 01/18/2019 45  <100 mg/dL Final    VLDL Cholesterol Calculated 01/18/2019 26  Not Established insulin lispro (ADMELOG) 100 UNIT/ML injection vial; Use for VGO, up to 200 units per day. 30 days: 6 vials  -     Hemoglobin A1C; Future  -     Insulin Disposable Pump (V-GO 30) KIT; Use once a day. One click for the snack and three clicks for meals    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 (HCC)  -     insulin lispro (ADMELOG) 100 UNIT/ML injection vial; Use for VGO, up to 200 units per day. 30 days: 6 vials  -     Hemoglobin A1C; Future  -     Insulin Disposable Pump (V-GO 30) KIT; Use once a day. One click for the snack and three clicks for meals    Diabetic polyneuropathy associated with type 2 diabetes mellitus ([facility] [address]    Essential hypertension    Morbid obesity due to excess calories ([facility] [address]          1: Type 2 DM complicated with nephropathy, neuropathy TIAs, carotid stenosis   Uncontrolled A1C 10.1 % Jan 18th 2019 << 8.1 <<  8% << 11.7%    A1C of <8 would be acceptable because of microvascular and macrovascular complications. Poor adherence to medical plan, treatment adherence and diet plan   I think she needs help at home, either a family member needs to move or she needs to go to a nursing home     She is on multiple insulin shots, checking blood 4 times per day. Will suggest personal CGM as she has wide fluctuations and frequent insulin adjustments. Will consider Professional CGM     Continue VGO   May be missing shot or clicks due to nails? Change 3 clicks for breakfast, lunch and dinner, 2 clicks for snacks     C/w Metformin 1000mg bid   Victoza 1.8mg in am     All instructions provided in written. Check Blood sugars 4 times per day. Log them along with insulin and send them every 2 weeks. Call for blood sugars less than 60 or more than 400. Eye exam: Last exam in 2016, Needs an exam now. March 12th 2018.    Foot exam:  March 2020    Deformity/amputation: absent  Skin lesions/pre-ulcerative calluses: absent  Edema: right- negative, left- negative  Sensory exam: Monofilament sensation: normal  Plus at least one of the following:   Pulses: normal,   Vibration (128 Hz): Moderately decreased     Renal screen: High 68 Feb 2018, high 47 Jan 2019    Counselled for smoking cessation     TSH screen: Feb 2018   Saw CDE in 2016 with Rd.     2: HTN   Controlled     3: Hyperlipidemia   LDL: 45 , HDL: 45 , TGs: 130 Jan 2019     Simvastatin 20mg     RTC in 6 weeks with logs   Labs on the visit   A1C today      EDUCATION:   Greater than 50% of this follow-up visit was spent in general counseling regarding   obesity, diet, exercise, importance of adherence to insulin regime, recognition and treatment of hypo and hyperglycemia,  glucose logging, proper diabetes management, diabetic complications with poor management and the importance of glycemic control in order to avoid the complications of diabetes. Risks and potential complications of diabetes were reviewed with the patient. Diabetes health maintenance plan and follow-up were discussed and understood by the patient. We reviewed the importance of medication compliance and regular follow-up. Aggressive lifestyle modification was encouraged. Exercise Counselling: This patient is a candidate for regular physical exercise. Instructions to perform the following types of exercise:  Swimming or water aerobic exercise  Brisk walking  Playing tennis  Stationary bicycle or elliptical indoor  Low impact aerobic exercise    Instructions given to exercise for the following duration:  30 minutes a day for five-seven days per week.     Following instructions for being active throughout the day in addition to formal exercise:  Walk instead of drive whenever possible  Take the stairs instead of the elevator  Work in the garden  Park to the far end of the parking lot to add more walking steps to destination      Electronically signed by Thelma Blevins MD on 4/30/2019 at 1:16 PM

## 2019-05-01 RX ORDER — SUB-Q INSULIN DEVICE, 40 UNIT
EACH MISCELLANEOUS
Qty: 30 KIT | Refills: 1 | OUTPATIENT
Start: 2019-05-01

## 2019-06-11 ENCOUNTER — OFFICE VISIT (OUTPATIENT)
Dept: ENDOCRINOLOGY | Age: 59
End: 2019-06-11
Payer: MEDICAID

## 2019-06-11 VITALS
HEART RATE: 72 BPM | WEIGHT: 181 LBS | OXYGEN SATURATION: 99 % | DIASTOLIC BLOOD PRESSURE: 71 MMHG | SYSTOLIC BLOOD PRESSURE: 113 MMHG | HEIGHT: 63 IN | BODY MASS INDEX: 32.07 KG/M2

## 2019-06-11 DIAGNOSIS — E11.69 DYSLIPIDEMIA ASSOCIATED WITH TYPE 2 DIABETES MELLITUS (HCC): ICD-10-CM

## 2019-06-11 DIAGNOSIS — I10 ESSENTIAL HYPERTENSION: ICD-10-CM

## 2019-06-11 DIAGNOSIS — E11.42 DIABETIC POLYNEUROPATHY ASSOCIATED WITH TYPE 2 DIABETES MELLITUS (HCC): ICD-10-CM

## 2019-06-11 DIAGNOSIS — E11.59 TYPE 2 DIABETES MELLITUS WITH VASCULAR DISEASE (HCC): ICD-10-CM

## 2019-06-11 DIAGNOSIS — E66.01 MORBID OBESITY DUE TO EXCESS CALORIES (HCC): ICD-10-CM

## 2019-06-11 DIAGNOSIS — E78.5 DYSLIPIDEMIA ASSOCIATED WITH TYPE 2 DIABETES MELLITUS (HCC): ICD-10-CM

## 2019-06-11 PROCEDURE — 99214 OFFICE O/P EST MOD 30 MIN: CPT | Performed by: INTERNAL MEDICINE

## 2019-06-11 PROCEDURE — G8427 DOCREV CUR MEDS BY ELIG CLIN: HCPCS | Performed by: INTERNAL MEDICINE

## 2019-06-11 PROCEDURE — G8417 CALC BMI ABV UP PARAM F/U: HCPCS | Performed by: INTERNAL MEDICINE

## 2019-06-11 PROCEDURE — G8598 ASA/ANTIPLAT THER USED: HCPCS | Performed by: INTERNAL MEDICINE

## 2019-06-11 PROCEDURE — 3017F COLORECTAL CA SCREEN DOC REV: CPT | Performed by: INTERNAL MEDICINE

## 2019-06-11 PROCEDURE — 2022F DILAT RTA XM EVC RTNOPTHY: CPT | Performed by: INTERNAL MEDICINE

## 2019-06-11 PROCEDURE — 1036F TOBACCO NON-USER: CPT | Performed by: INTERNAL MEDICINE

## 2019-06-11 PROCEDURE — 3046F HEMOGLOBIN A1C LEVEL >9.0%: CPT | Performed by: INTERNAL MEDICINE

## 2019-06-11 NOTE — PATIENT INSTRUCTIONS
Patient Education        Noninsulin Medicines for Type 2 Diabetes: Care Instructions  Your Care Instructions    There are different types of noninsulin medicines for diabetes. Each works in a different way. But they all help you control your blood sugar. Some types help your body make insulin to lower your blood sugar. Others lower how much insulin your body needs. Some can slow how fast your body digests sugars. And some can remove extra glucose through your urine. · Alpha-glucosidase inhibitors. These keep starches from breaking down. This means that they lower the amount of glucose absorbed when you eat. They don't help your body make more insulin. So they will not cause low blood sugar unless you use them with other medicines for diabetes. They include acarbose and miglitol. · DPP-4 inhibitors. These help your body raise the level of insulin after you eat. They also help your body make less of a hormone that raises blood sugar. They include linagliptin, saxagliptin, and sitagliptin. · Incretin hormones (GLP-1 receptor agonists). Your body makes a protein that can raise your insulin level. It also can lower your blood sugar and make you less hungry. You can get shots of hormones that work the same way. They include exenatide and liraglutide. · Meglitinides. These help your body release insulin. They also help slow how your body digests sugars. So they can keep your blood sugar from rising too fast after you eat. They include nateglinide and repaglinide. · Metformin. This lowers how much glucose your liver makes. And it helps you respond better to insulin. It also lowers the amount of stored sugar that your liver releases when you are not eating. · SGLT2 inhibitors. These help to remove extra glucose through your urine. They may also help some people lose weight. They include canagliflozin, dapagliflozin, and empagliflozin. · Sulfonylureas. These help your body release more insulin.  Some work for [employer] hours. They can cause low blood sugar if you don't eat as you planned. They include glipizide and glyburide. · Thiazolidinediones. These reduce the amount of blood glucose. They also help you respond better to insulin. They include pioglitazone and rosiglitazone. You may need to take more than one medicine for diabetes. Two or more medicines may work better to lower your blood sugar level than just one does. Follow-up care is a key part of your treatment and safety. Be sure to make and go to all appointments, and call your doctor if you are having problems. It's also a good idea to know your test results and keep a list of the medicines you take. How can you care for yourself at home? · Eat a healthy diet. Get some exercise each day. This may help you to reduce how much medicine you need. · Do not take other prescription or over-the-counter medicines, vitamins, herbal products, or supplements without talking to your doctor first. Some medicines for type 2 diabetes can cause problems with other medicines or supplements. · Tell your doctor if you plan to get pregnant. Some of these drugs are not safe for pregnant women. · Be safe with medicines. Take your medicines exactly as prescribed. Meglitinides and sulfonylureas can cause your blood sugar to drop very low. Call your doctor if you think you are having a problem with your medicine. · Check your blood sugar often. You can use a glucose monitor. Keeping track can help you know how certain foods, activities, and medicines affect your blood sugar. And it can help you keep your blood sugar from getting so low that it's not safe. When should you call for help? Call 911 anytime you think you may need emergency care.  For example, call if:    · You passed out (lost consciousness).     · You are confused or cannot think clearly.     · Your blood sugar is very high or very low.    Watch closely for changes in your health, and be sure to contact your doctor if:    · Your blood sugar stays outside the level your doctor set for you.     · You have any problems. Where can you learn more? Go to [email or web address] org and sign in to your [employer] account. Enter H153 in the [employer] box to learn more about \"Noninsulin Medicines for Type 2 Diabetes: Care Instructions. \"     If you do not have an account, please click on the \"Sign Up Now\" link. Current as of: July 25, 2018  Content Version: 12.0  © 2105-1881 [employer]. Care instructions adapted under license by [facility] ([facility]). If you have questions about a medical condition or this instruction, always ask your healthcare professional. [address] any warranty or liability for your use of this information.

## 2019-06-11 NOTE — PROGRESS NOTES
Follow package directions for low blood sugar., Disp: 1 kit, Rfl: 0    Insulin Pen Needle (UNIFINE PENTIPS) 32G X 4 MM MISC, 6 times daily for victoza and insulins. , Disp: 200 each, Rfl: 11    Insulin Syringe-Needle U-100 (INSULIN SYRINGE .3CC/31GX5/16\") 31G X 5/16\" 0.3 ML MISC, tidac for Lispro, Disp: 100 Syringe, Rfl: 6    clonazePAM (KLONOPIN) 1 MG tablet, Take 1 mg by mouth as needed. ., Disp: , Rfl:     lisinopril (PRINIVIL;ZESTRIL) 10 MG tablet, TAKE 1 TABLET BY MOUTH DAILY, Disp: 30 tablet, Rfl: 0    metFORMIN (GLUCOPHAGE) 1000 MG tablet, TAKE 1 TABLET WITH MEALS TWICE A DAY, Disp: 60 tablet, Rfl: 0    B-D INS SYR ULTRAFINE 1CC/31G 31G X 5/16\" 1 ML MISC, USE THREE TIMES DAILY, Disp: , Rfl: 3    paliperidone (INVEGA) 9 MG extended release tablet, Take 9 mg by mouth every morning , Disp: , Rfl:     simvastatin (ZOCOR) 20 MG tablet, Take 20 mg by mouth nightly , Disp: , Rfl:     aspirin 81 MG tablet, Take 81 mg by mouth daily, Disp: , Rfl:     gabapentin (NEURONTIN) 600 MG tablet, Take 600 mg by mouth 3 times daily, Disp: , Rfl:     oxyCODONE-acetaminophen (PERCOCET) 5-325 MG per tablet, Take 1 tablet by mouth every 6 hours as needed for Pain. ., Disp: , Rfl:     traZODone (DESYREL) 100 MG tablet, Take 100 mg by mouth nightly, Disp: , Rfl:     omeprazole (PRILOSEC) 20 MG capsule, Take 1 capsule by mouth daily, Disp: 30 capsule, Rfl: 0      Review of Systems:    Constitutional: Negative for fever, chills, and unexpected weight change. HENT: Negative for congestion, ear pain, rhinorrhea,  sore throat and trouble swallowing. Eyes: Negative for photophobia, redness, itching. Respiratory: Negative for cough, shortness of breath and sputum. Cardiovascular: Negative for chest pain, palpitations and leg swelling. Gastrointestinal: Negative for nausea, vomiting, abdominal pain, diarrhea, constipation. Endocrine: Negative for cold intolerance, heat intolerance, polydipsia, polyphagia and polyuria. Genitourinary: Negative for dysuria, urgency, frequency, hematuria and flank pain. Musculoskeletal: Negative for myalgias, back pain, arthralgias and neck pain. Skin/Nail: Negative for rash, itching. Normal nails. Neurological: Negative for seizures, weakness, light-headedness, numbness and headaches. Hematological/ Lymph nodes: Negative for adenopathy. Does not bruise/bleed easily. Psychiatric/Behavioral: Negative for suicidal ideas, depression, anxiety, sleep disturbance and decreased concentration. Objective:   Physical Exam:  /71 (Site: Right Upper Arm, Position: Sitting, Cuff Size: Large Adult)   Pulse 72   Ht 5' 3\" (1.6 m)   Wt 181 lb (82.1 kg)   SpO2 99%   Breastfeeding? No   BMI 32.06 kg/m²   Constitutional: Patient is oriented to person, place, and time. Patient appears well-developed and well-nourished. HENT:    Head: Normocephalic and atraumatic. Eyes: Conjunctivae and EOM are normal.     Neck: Normal range of motion. Thyroid exam normal.   Cardiovascular: Normal rate, regular rhythm and normal heart sounds. Pulmonary/Chest: Effort normal and breath sounds normal.   Abdominal: Soft. Bowel sounds are normal.   Musculoskeletal: Normal range of motion. Neurological: Patient is alert and oriented to person, place, and time. Patient has normal reflexes. Skin: Skin is warm and dry. Psychiatric: Patient has a normal mood and affect.  Patient behavior is normal.     Lab Review:    Hospital Outpatient Visit on 01/18/2019   Component Date Value Ref Range Status    Hemoglobin A1C 01/18/2019 10.1  See comment % Final    eAG 01/18/2019 243.2  mg/dL Final    Cholesterol, Total 01/18/2019 116  0 - 199 mg/dL Final    Triglycerides 01/18/2019 130  0 - 150 mg/dL Final    HDL 01/18/2019 45  40 - 60 mg/dL Final    LDL Calculated 01/18/2019 45  <100 mg/dL Final    VLDL Cholesterol Calculated 01/18/2019 26  Not Established mg/dL Final    Sodium 01/18/2019 138  136 - 145 mmol/L Final    Potassium 01/18/2019 4.2  3.5 - 5.1 mmol/L Final    Chloride 01/18/2019 96* 99 - 110 mmol/L Final    CO2 01/18/2019 25  21 - 32 mmol/L Final    Anion Gap 01/18/2019 17* 3 - 16 Final    Glucose 01/18/2019 361* 70 - 99 mg/dL Final    BUN 01/18/2019 21* 7 - 20 mg/dL Final    CREATININE 01/18/2019 1.1  0.6 - 1.1 mg/dL Final    GFR Non- 01/18/2019 51* >60 Final    GFR  01/18/2019 >60  >60 Final    Calcium 01/18/2019 11.2* 8.3 - 10.6 mg/dL Final    Total Protein 01/18/2019 8.5* 6.4 - 8.2 g/dL Final    Alb 01/18/2019 4.3  3.4 - 5.0 g/dL Final    Albumin/Globulin Ratio 01/18/2019 1.0* 1.1 - 2.2 Final    Total Bilirubin 01/18/2019 <0.2  0.0 - 1.0 mg/dL Final    Alkaline Phosphatase 01/18/2019 144* 40 - 129 U/L Final    ALT 01/18/2019 32  10 - 40 U/L Final    AST 01/18/2019 22  15 - 37 U/L Final    Globulin 01/18/2019 4.2  g/dL Final    Microalbumin, Random Urine 01/18/2019 2.70* <2.0 mg/dL Final    Creatinine, Ur 01/18/2019 57.0  28.0 - 259.0 mg/dL Final    Microalbumin Creatinine Ratio 01/18/2019 47.4* 0.0 - 30.0 mg/g Final   Orders Only on 06/19/2018   Component Date Value Ref Range Status    Hemoglobin A1C 06/19/2018 8.1  See comment % Final    eAG 06/19/2018 185.8  mg/dL Final   Office Visit on 06/19/2018   Component Date Value Ref Range Status    Diabetic Retinopathy 04/19/2018 NO DR   Final           Assessment and Plan     Jaron Almeida was seen today for diabetes.     Diagnoses and all orders for this visit:    Uncontrolled type 2 diabetes mellitus with microalbuminuria, with long-term current use of insulin ([address]    Uncontrolled type 2 diabetes mellitus with diabetic nephropathy, with long-term current use of insulin ([facility])    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 [facility]    Dyslipidemia associated with type 2 diabetes mellitus ([facility] [facility]    Diabetic polyneuropathy associated with type 2 diabetes mellitus ([facility]    Essential hypertension    Morbid obesity due to excess calories ([facility] [address]          1: Type 2 DM complicated with nephropathy, neuropathy TIAs, carotid stenosis   Uncontrolled A1C 10.1 % Jan 18th 2019 << 8.1 <<  8% << 11.7%    A1C of <8 would be acceptable because of microvascular and macrovascular complications. Poor adherence to medical plan, treatment adherence and diet plan   I think she needs help at home, either a family member needs to move or she needs to go to a nursing home     She is on multiple insulin shots, checking blood 4 times per day. Will suggest personal CGM as she has wide fluctuations and frequent insulin adjustments. Will consider Professional CGM     Joan not approved     Continue VGO- 30 for now. May change VGO to 20    May be missing shot or clicks due to nails? Change 4 clicks for breakfast, 3 clicks for lunch and dinner, 2 clicks for snacks     C/w Metformin 1000mg bid   Victoza 1.8mg in am     All instructions provided in written. Check Blood sugars 4 times per day. Log them along with insulin and send them every 2 weeks. Call for blood sugars less than 60 or more than 400. Eye exam: Last exam in 2016, Needs an exam now. March 12th 2018. Foot exam:  March 2020    Deformity/amputation: absent  Skin lesions/pre-ulcerative calluses: absent  Edema: right- negative, left- negative  Sensory exam: Monofilament sensation: normal  Plus at least one of the following:   Pulses: normal,   Vibration (128 Hz):  Moderately decreased     Renal screen: High 68 Feb 2018, high 47 Jan 2019    Counselled for smoking cessation     TSH screen: Feb 2018   Saw CDE in 2016 with Rd.     2: HTN   Controlled     3: Hyperlipidemia   LDL: 45 , HDL: 45 , TGs: 130 Jan 2019     Simvastatin 20mg     RTC in 4 weeks with logs   Labs on the visit   A1C today, she did not do on last visit either     EDUCATION:   Greater than 50% of this follow-up visit was spent in general counseling regarding   obesity, diet, exercise, importance of adherence to insulin regime, recognition and treatment of hypo and hyperglycemia,  glucose logging, proper diabetes management, diabetic complications with poor management and the importance of glycemic control in order to avoid the complications of diabetes. Risks and potential complications of diabetes were reviewed with the patient. Diabetes health maintenance plan and follow-up were discussed and understood by the patient. We reviewed the importance of medication compliance and regular follow-up. Aggressive lifestyle modification was encouraged. Exercise Counselling: This patient is a candidate for regular physical exercise. Instructions to perform the following types of exercise:  Swimming or water aerobic exercise  Brisk walking  Playing tennis  Stationary bicycle or elliptical indoor  Low impact aerobic exercise    Instructions given to exercise for the following duration:  30 minutes a day for five-seven days per week.     Following instructions for being active throughout the day in addition to formal exercise:  Walk instead of drive whenever possible  Take the stairs instead of the elevator  Work in the garden  Park to the far end of the parking lot to add more walking steps to destination      Electronically signed by Cassie Rosas MD on 6/11/2019 at 1:06 PM

## 2019-06-12 LAB
ESTIMATED AVERAGE GLUCOSE: 148.5 MG/DL
HBA1C MFR BLD: 6.8 %

## 2019-06-26 RX ORDER — BLOOD SUGAR DIAGNOSTIC
STRIP MISCELLANEOUS
Qty: 100 EACH | Refills: 6 | Status: SHIPPED | OUTPATIENT
Start: 2019-06-26 | End: 2020-01-31

## 2019-06-29 ENCOUNTER — APPOINTMENT (OUTPATIENT)
Dept: GENERAL RADIOLOGY | Age: 59
End: 2019-06-29
Payer: MEDICAID

## 2019-06-29 ENCOUNTER — HOSPITAL ENCOUNTER (EMERGENCY)
Age: 59
Discharge: HOME | End: 2019-06-30
Payer: MEDICAID

## 2019-06-29 DIAGNOSIS — M25.552 LEFT HIP PAIN: Primary | ICD-10-CM

## 2019-06-29 DIAGNOSIS — M54.32 SCIATICA OF LEFT SIDE: ICD-10-CM

## 2019-06-29 PROCEDURE — 99283 EMERGENCY DEPT VISIT LOW MDM: CPT

## 2019-06-29 PROCEDURE — 6360000002 HC RX W HCPCS: Performed by: NURSE PRACTITIONER

## 2019-06-29 PROCEDURE — 96372 THER/PROPH/DIAG INJ SC/IM: CPT

## 2019-06-29 PROCEDURE — 73502 X-RAY EXAM HIP UNI 2-3 VIEWS: CPT

## 2019-06-29 RX ORDER — ORPHENADRINE CITRATE 30 MG/ML
60 INJECTION INTRAMUSCULAR; INTRAVENOUS ONCE
Status: COMPLETED | OUTPATIENT
Start: 2019-06-29 | End: 2019-06-29

## 2019-06-29 RX ORDER — KETOROLAC TROMETHAMINE 30 MG/ML
30 INJECTION, SOLUTION INTRAMUSCULAR; INTRAVENOUS ONCE
Status: COMPLETED | OUTPATIENT
Start: 2019-06-29 | End: 2019-06-29

## 2019-06-29 RX ADMIN — ORPHENADRINE CITRATE 60 MG: 60 INJECTION INTRAMUSCULAR; INTRAVENOUS at 23:45

## 2019-06-29 RX ADMIN — KETOROLAC TROMETHAMINE 30 MG: 30 INJECTION, SOLUTION INTRAMUSCULAR at 23:44

## 2019-06-29 ASSESSMENT — PAIN DESCRIPTION - ONSET: ONSET: ON-GOING

## 2019-06-29 ASSESSMENT — PAIN SCALES - GENERAL: PAINLEVEL_OUTOF10: 10

## 2019-06-29 ASSESSMENT — PAIN DESCRIPTION - FREQUENCY: FREQUENCY: CONTINUOUS

## 2019-06-29 ASSESSMENT — PAIN DESCRIPTION - PROGRESSION: CLINICAL_PROGRESSION: GRADUALLY WORSENING

## 2019-06-29 ASSESSMENT — PAIN DESCRIPTION - ORIENTATION: ORIENTATION: LEFT

## 2019-06-29 ASSESSMENT — PAIN DESCRIPTION - PAIN TYPE: TYPE: ACUTE PAIN

## 2019-06-29 ASSESSMENT — PAIN DESCRIPTION - DESCRIPTORS: DESCRIPTORS: ACHING;THROBBING

## 2019-06-29 ASSESSMENT — PAIN DESCRIPTION - LOCATION: LOCATION: HIP;LEG

## 2019-06-30 VITALS
HEIGHT: 63 IN | HEART RATE: 71 BPM | DIASTOLIC BLOOD PRESSURE: 70 MMHG | BODY MASS INDEX: 32.07 KG/M2 | SYSTOLIC BLOOD PRESSURE: 155 MMHG | TEMPERATURE: 98 F | RESPIRATION RATE: 16 BRPM | OXYGEN SATURATION: 96 % | WEIGHT: 181 LBS

## 2019-06-30 RX ORDER — PREDNISONE 10 MG/1
TABLET ORAL
Qty: 30 TABLET | Refills: 0 | Status: SHIPPED | OUTPATIENT
Start: 2019-06-30 | End: 2019-07-10

## 2019-06-30 RX ORDER — METHOCARBAMOL 500 MG/1
500 TABLET, FILM COATED ORAL 3 TIMES DAILY
Qty: 21 TABLET | Refills: 0 | Status: SHIPPED | OUTPATIENT
Start: 2019-06-30 | End: 2019-07-03

## 2019-06-30 ASSESSMENT — ENCOUNTER SYMPTOMS
BACK PAIN: 0
NAUSEA: 0
DIARRHEA: 0
ABDOMINAL PAIN: 0
WHEEZING: 0
SHORTNESS OF BREATH: 0
VOMITING: 0
COUGH: 0
COLOR CHANGE: 0

## 2019-06-30 NOTE — ED PROVIDER NOTES
[facility]  [facility]  EMERGENCY DEPARTMENT ENCOUNTER      Pt Name: Blu Casanova  BJX:[id number]  Janicegfurt 1960  Date of evaluation: 6/29/2019  Provider: SARTHAK Raknin CNP      Chief Complaint:    Chief Complaint   Patient presents with    Hip Pain     states left hip pain shotting down her left leg, states hurting for 2 days and \" I can't take it anymore\", patient states \" unable to walk now\"       Nursing Notes, Past Medical Hx, Past Surgical Hx, Social Hx, Allergies, and Family Hx were all reviewed and agreed with or any disagreements were addressed in the HPI.    HPI:  (Location, Duration, Timing, Severity,Quality, Assoc Sx, Context, Modifying factors)  This is a  60 y.o. female who presents emergency department with a 2-day history of left hip pain radiating down her leg, she states that the pain is getting worse to where she is having a hard time walking. She denies any falls traumas or injuries. States any event worsens the pain, rates the pain a 10 out of 10. Denies any numbness tingling or paresthesias. She states that she takes Percocet on a regular basis is not helping the pain. She denies any neck or back pain, no additional aggravating or relieving factors. No urinary symptoms, no loss of bowel bladder control, no saddle anesthesia. She presents awake, alert and in no acute distress or toxic appearance.     PastMedical/Surgical History:      Diagnosis Date    Abnormal brain MRI 7/20/2017    Partially empty sella and minimal chronic small vessel ischemic disease    Acute bilateral low back pain without sciatica 11/2/2016    SHARRON (acute kidney injury) [facility] [address] 7/5/2017    Arthritis     back    Bipolar disorder ([facility] [address] 10/18/2008    Cancer ([facility] [address] 2015    bilateral breast:s/p lumpectomy/radiation:under care care of breast specialist:Dr. Boone     Carotid stenosis, bilateral:<50%:per US 7/2016 7/15/2016    Carpal tunnel syndrome for victoza and insulins. INSULIN SYRINGE-NEEDLE U-100 (BD INSULIN SYRINGE U/F) 31G X 5/16\" 0.3 ML MISC    USE 3 TIMES A DAY BEFORE MEALS    LETROZOLE (FEMARA) 2.5 MG TABLET    TAKE 1 TABLET BY MOUTH EVERY DAY    LISINOPRIL (PRINIVIL;ZESTRIL) 10 MG TABLET    TAKE 1 TABLET BY MOUTH DAILY    METFORMIN (GLUCOPHAGE) 1000 MG TABLET    TAKE 1 TABLET WITH MEALS TWICE A DAY    OMEPRAZOLE (PRILOSEC) 20 MG CAPSULE    Take 1 capsule by mouth daily    OXYCODONE-ACETAMINOPHEN (PERCOCET) 5-325 MG PER TABLET    Take 1 tablet by mouth every 6 hours as needed for Pain. Dodie Peaks PALIPERIDONE (INVEGA) 9 MG EXTENDED RELEASE TABLET    Take 9 mg by mouth every morning     SIMVASTATIN (ZOCOR) 20 MG TABLET    Take 20 mg by mouth nightly     TRAZODONE (DESYREL) 100 MG TABLET    Take 100 mg by mouth nightly    TRUE METRIX BLOOD GLUCOSE TEST STRIP    USE AS DIRECTED TO TEST 4 TIMES A DAY    VICTOZA 18 MG/3ML SOPN SC INJECTION    INJECT 1.8 MG INTO THE SKIN DAILY         Review of Systems:  Review of Systems   Constitutional: Negative for chills and fever. HENT: Negative for congestion. Respiratory: Negative for cough, shortness of breath and wheezing. Cardiovascular: Negative for chest pain. Gastrointestinal: Negative for abdominal pain, diarrhea, nausea and vomiting. Genitourinary: Negative for difficulty urinating and dysuria. Musculoskeletal: Positive for arthralgias and myalgias. Negative for back pain. Patient complains of left hip pain that radiates on her left leg, denies any falls traumas or injuries. Skin: Negative for color change. Neurological: Negative for weakness, numbness and headaches. Positives and Pertinent negatives as per HPI. Except as noted above in the ROS, problem specific ROS was completed and is negative. Physical Exam:  Physical Exam   Constitutional: She is oriented to person, place, and time. She appears well-developed and well-nourished. HENT:   Head: Normocephalic.    Right Ear: [address] [facility]  [phone]    Schedule an appointment as soon as possible for a visit in 2 days  Follow-up with your family doctor in 2 days for reevaluation    [address]  [phone]  Schedule an appointment as soon as possible for a visit in 2 days  This is an orthopedic specialist I would like you to follow-up with, call Monday for an appointment      DISCHARGE MEDICATIONS:  New Prescriptions    METHOCARBAMOL (ROBAXIN) 500 MG TABLET    Take 1 tablet by mouth 3 times daily for 7 days    PREDNISONE (DELTASONE) 10 MG TABLET    5 tabs po qam for 2 days then 4,3,2,1 tabs qam for 2 days each total of 10 days       DISCONTINUED MEDICATIONS:  Discontinued Medications    No medications on file              (Please note the MDM and HPI sections of this note were completed with a voice recognition program.  Efforts weremade to edit the dictations but occasionally words are mis-transcribed.)    Electronically signed, SARTHAK Garcia CNP,         SARTHAK Garcia CNP  06/30/19 [id number]

## 2019-07-02 ENCOUNTER — TELEPHONE (OUTPATIENT)
Dept: ENDOCRINOLOGY | Age: 59
End: 2019-07-02

## 2019-07-03 ENCOUNTER — OFFICE VISIT (OUTPATIENT)
Dept: ENDOCRINOLOGY | Age: 59
End: 2019-07-03
Payer: MEDICAID

## 2019-07-03 VITALS
OXYGEN SATURATION: 100 % | DIASTOLIC BLOOD PRESSURE: 83 MMHG | HEART RATE: 80 BPM | BODY MASS INDEX: 32.6 KG/M2 | WEIGHT: 184 LBS | SYSTOLIC BLOOD PRESSURE: 151 MMHG | HEIGHT: 63 IN

## 2019-07-03 DIAGNOSIS — E11.59 TYPE 2 DIABETES MELLITUS WITH VASCULAR DISEASE (HCC): ICD-10-CM

## 2019-07-03 DIAGNOSIS — E78.5 DYSLIPIDEMIA ASSOCIATED WITH TYPE 2 DIABETES MELLITUS (HCC): ICD-10-CM

## 2019-07-03 DIAGNOSIS — E11.69 DYSLIPIDEMIA ASSOCIATED WITH TYPE 2 DIABETES MELLITUS (HCC): ICD-10-CM

## 2019-07-03 DIAGNOSIS — I10 ESSENTIAL HYPERTENSION: Chronic | ICD-10-CM

## 2019-07-03 PROCEDURE — 1036F TOBACCO NON-USER: CPT | Performed by: INTERNAL MEDICINE

## 2019-07-03 PROCEDURE — G8417 CALC BMI ABV UP PARAM F/U: HCPCS | Performed by: INTERNAL MEDICINE

## 2019-07-03 PROCEDURE — 3017F COLORECTAL CA SCREEN DOC REV: CPT | Performed by: INTERNAL MEDICINE

## 2019-07-03 PROCEDURE — G8427 DOCREV CUR MEDS BY ELIG CLIN: HCPCS | Performed by: INTERNAL MEDICINE

## 2019-07-03 PROCEDURE — 2022F DILAT RTA XM EVC RTNOPTHY: CPT | Performed by: INTERNAL MEDICINE

## 2019-07-03 PROCEDURE — 99214 OFFICE O/P EST MOD 30 MIN: CPT | Performed by: INTERNAL MEDICINE

## 2019-07-03 PROCEDURE — G8598 ASA/ANTIPLAT THER USED: HCPCS | Performed by: INTERNAL MEDICINE

## 2019-07-03 PROCEDURE — 3044F HG A1C LEVEL LT 7.0%: CPT | Performed by: INTERNAL MEDICINE

## 2019-07-03 RX ORDER — SUB-Q INSULIN DEVICE, 40 UNIT
EACH MISCELLANEOUS
Qty: 30 KIT | Refills: 0 | Status: SHIPPED | OUTPATIENT
Start: 2019-07-03 | End: 2019-08-20

## 2019-07-03 NOTE — PROGRESS NOTES
Highest education level: Not on file   Occupational History    Occupation:    Social Needs    Financial resource strain: Not on file    Food insecurity:     Worry: Not on file     Inability: Not on file    Transportation needs:     Medical: Not on file     Non-medical: Not on file   Tobacco Use    Smoking status: Former Smoker     Packs/day: 0.50     Years: 20.00     Pack years: 10.00     Types: Cigarettes, Cigars     Last attempt to quit: 7/3/2014     Years since quittin.0    Smokeless tobacco: Never Used   Substance and Sexual Activity    Alcohol use: No     Alcohol/week: 0.0 oz    Drug use: No    Sexual activity: Never   Lifestyle    Physical activity:     Days per week: Not on file     Minutes per session: Not on file    Stress: Not on file   Relationships    Social connections:     Talks on phone: Not on file     Gets together: Not on file     Attends Adventism service: Not on file     Active member of club or organization: Not on file     Attends meetings of clubs or organizations: Not on file     Relationship status: Not on file    Intimate partner violence:     Fear of current or ex partner: Not on file     Emotionally abused: Not on file     Physically abused: Not on file     Forced sexual activity: Not on file   Other Topics Concern    Not on file   Social History Narrative    Not on file       Past Medical History:   Diagnosis Date    Abnormal brain MRI 2017    Partially empty sella and minimal chronic small vessel ischemic disease    Acute bilateral low back pain without sciatica 2016    [facility]) [facility] [address] 2017    Arthritis     back    Bipolar disorder ([facility] [address] 10/18/2008    Cancer ([facility] [address]     bilateral breast:s/p lumpectomy/radiation:under care care of breast specialist:Dr. Boone     Carotid stenosis, bilateral:<50%:per US 2016 7/15/2016    Carpal tunnel syndrome 10/18/2008    Cervical cancer screening 2014    Nml per pt'.     DDD

## 2019-07-22 ENCOUNTER — HOSPITAL ENCOUNTER (EMERGENCY)
Age: 59
Discharge: HOME | End: 2019-07-23
Attending: EMERGENCY MEDICINE
Payer: MEDICAID

## 2019-07-22 ENCOUNTER — APPOINTMENT (OUTPATIENT)
Dept: CT IMAGING | Age: 59
End: 2019-07-22
Payer: MEDICAID

## 2019-07-22 DIAGNOSIS — N30.00 ACUTE CYSTITIS WITHOUT HEMATURIA: Primary | ICD-10-CM

## 2019-07-22 DIAGNOSIS — K86.9 PANCREATIC LESION: ICD-10-CM

## 2019-07-22 DIAGNOSIS — R70.0 ELEVATED ERYTHROCYTE SEDIMENTATION RATE: ICD-10-CM

## 2019-07-22 DIAGNOSIS — D64.9 ANEMIA, UNSPECIFIED TYPE: ICD-10-CM

## 2019-07-22 DIAGNOSIS — M54.42 CHRONIC LEFT-SIDED LOW BACK PAIN WITH LEFT-SIDED SCIATICA: ICD-10-CM

## 2019-07-22 DIAGNOSIS — N18.9 CHRONIC KIDNEY DISEASE, UNSPECIFIED CKD STAGE: ICD-10-CM

## 2019-07-22 DIAGNOSIS — G89.29 CHRONIC LEFT-SIDED LOW BACK PAIN WITH LEFT-SIDED SCIATICA: ICD-10-CM

## 2019-07-22 LAB
A/G RATIO: 1.2 (ref 1.1–2.2)
ALBUMIN SERPL-MCNC: 4.1 G/DL (ref 3.4–5)
ALP BLD-CCNC: 147 U/L (ref 40–129)
ALT SERPL-CCNC: 48 U/L (ref 10–40)
ANION GAP SERPL CALCULATED.3IONS-SCNC: 9 MMOL/L (ref 3–16)
AST SERPL-CCNC: 32 U/L (ref 15–37)
BACTERIA: ABNORMAL /HPF
BASOPHILS ABSOLUTE: 0 K/UL (ref 0–0.2)
BASOPHILS RELATIVE PERCENT: 0.4 %
BILIRUB SERPL-MCNC: <0.2 MG/DL (ref 0–1)
BILIRUBIN URINE: NEGATIVE
BLOOD, URINE: ABNORMAL
BUN BLDV-MCNC: 35 MG/DL (ref 7–20)
CALCIUM SERPL-MCNC: 9.3 MG/DL (ref 8.3–10.6)
CHLORIDE BLD-SCNC: 102 MMOL/L (ref 99–110)
CLARITY: CLEAR
CO2: 28 MMOL/L (ref 21–32)
COLOR: ABNORMAL
CREAT SERPL-MCNC: 1.3 MG/DL (ref 0.6–1.2)
EOSINOPHILS ABSOLUTE: 0.1 K/UL (ref 0–0.6)
EOSINOPHILS RELATIVE PERCENT: 0.9 %
GFR AFRICAN AMERICAN: 50
GFR NON-AFRICAN AMERICAN: 42
GLOBULIN: 3.5 G/DL
GLUCOSE BLD-MCNC: 164 MG/DL (ref 70–99)
GLUCOSE URINE: 100 MG/DL
HCT VFR BLD CALC: 32.9 % (ref 36–48)
HEMOGLOBIN: 10.3 G/DL (ref 12–16)
KETONES, URINE: NEGATIVE MG/DL
LEUKOCYTE ESTERASE, URINE: ABNORMAL
LIPASE: 15 U/L (ref 13–60)
LYMPHOCYTES ABSOLUTE: 2.7 K/UL (ref 1–5.1)
LYMPHOCYTES RELATIVE PERCENT: 33.7 %
MCH RBC QN AUTO: 27.7 PG (ref 26–34)
MCHC RBC AUTO-ENTMCNC: 31.3 G/DL (ref 31–36)
MCV RBC AUTO: 88.2 FL (ref 80–100)
MICROSCOPIC EXAMINATION: YES
MONOCYTES ABSOLUTE: 0.6 K/UL (ref 0–1.3)
MONOCYTES RELATIVE PERCENT: 7.1 %
NEUTROPHILS ABSOLUTE: 4.7 K/UL (ref 1.7–7.7)
NEUTROPHILS RELATIVE PERCENT: 57.9 %
NITRITE, URINE: POSITIVE
PDW BLD-RTO: 14.3 % (ref 12.4–15.4)
PH UA: 5.5 (ref 5–8)
PLATELET # BLD: 183 K/UL (ref 135–450)
PMV BLD AUTO: 8.4 FL (ref 5–10.5)
POTASSIUM SERPL-SCNC: 4.2 MMOL/L (ref 3.5–5.1)
PROTEIN UA: ABNORMAL MG/DL
RBC # BLD: 3.73 M/UL (ref 4–5.2)
RBC UA: ABNORMAL /HPF (ref 0–2)
SEDIMENTATION RATE, ERYTHROCYTE: 42 MM/HR (ref 0–30)
SODIUM BLD-SCNC: 139 MMOL/L (ref 136–145)
SPECIFIC GRAVITY UA: 1.02 (ref 1–1.03)
TOTAL PROTEIN: 7.6 G/DL (ref 6.4–8.2)
URINE TYPE: ABNORMAL
UROBILINOGEN, URINE: 0.2 E.U./DL
WBC # BLD: 8.1 K/UL (ref 4–11)
WBC UA: ABNORMAL /HPF (ref 0–5)

## 2019-07-22 PROCEDURE — 6370000000 HC RX 637 (ALT 250 FOR IP): Performed by: EMERGENCY MEDICINE

## 2019-07-22 PROCEDURE — 74176 CT ABD & PELVIS W/O CONTRAST: CPT

## 2019-07-22 PROCEDURE — 83690 ASSAY OF LIPASE: CPT

## 2019-07-22 PROCEDURE — 85025 COMPLETE CBC W/AUTO DIFF WBC: CPT

## 2019-07-22 PROCEDURE — 80053 COMPREHEN METABOLIC PANEL: CPT

## 2019-07-22 PROCEDURE — 36415 COLL VENOUS BLD VENIPUNCTURE: CPT

## 2019-07-22 PROCEDURE — 99283 EMERGENCY DEPT VISIT LOW MDM: CPT

## 2019-07-22 PROCEDURE — 81001 URINALYSIS AUTO W/SCOPE: CPT

## 2019-07-22 PROCEDURE — 96374 THER/PROPH/DIAG INJ IV PUSH: CPT

## 2019-07-22 PROCEDURE — 6360000002 HC RX W HCPCS: Performed by: EMERGENCY MEDICINE

## 2019-07-22 PROCEDURE — 85652 RBC SED RATE AUTOMATED: CPT

## 2019-07-22 RX ORDER — KETOROLAC TROMETHAMINE 30 MG/ML
15 INJECTION, SOLUTION INTRAMUSCULAR; INTRAVENOUS ONCE
Status: COMPLETED | OUTPATIENT
Start: 2019-07-22 | End: 2019-07-22

## 2019-07-22 RX ORDER — DIAZEPAM 5 MG/1
5 TABLET ORAL ONCE
Status: COMPLETED | OUTPATIENT
Start: 2019-07-22 | End: 2019-07-22

## 2019-07-22 RX ADMIN — KETOROLAC TROMETHAMINE 15 MG: 30 INJECTION, SOLUTION INTRAMUSCULAR at 21:55

## 2019-07-22 RX ADMIN — DIAZEPAM 5 MG: 5 TABLET ORAL at 21:55

## 2019-07-22 ASSESSMENT — ENCOUNTER SYMPTOMS
VOMITING: 0
BACK PAIN: 1
CONSTIPATION: 0
NAUSEA: 0
SHORTNESS OF BREATH: 0
DIARRHEA: 0
COLOR CHANGE: 0
ABDOMINAL PAIN: 1

## 2019-07-22 ASSESSMENT — PAIN SCALES - GENERAL
PAINLEVEL_OUTOF10: 6
PAINLEVEL_OUTOF10: 8
PAINLEVEL_OUTOF10: 7

## 2019-07-23 ENCOUNTER — OFFICE VISIT (OUTPATIENT)
Dept: ENDOCRINOLOGY | Age: 59
End: 2019-07-23
Payer: MEDICAID

## 2019-07-23 VITALS
WEIGHT: 184 LBS | RESPIRATION RATE: 15 BRPM | SYSTOLIC BLOOD PRESSURE: 131 MMHG | HEART RATE: 73 BPM | TEMPERATURE: 98.6 F | DIASTOLIC BLOOD PRESSURE: 59 MMHG | OXYGEN SATURATION: 96 % | BODY MASS INDEX: 32.59 KG/M2

## 2019-07-23 VITALS
HEART RATE: 88 BPM | DIASTOLIC BLOOD PRESSURE: 66 MMHG | SYSTOLIC BLOOD PRESSURE: 125 MMHG | BODY MASS INDEX: 32.78 KG/M2 | WEIGHT: 185 LBS | OXYGEN SATURATION: 99 % | HEIGHT: 63 IN

## 2019-07-23 DIAGNOSIS — E78.5 DYSLIPIDEMIA ASSOCIATED WITH TYPE 2 DIABETES MELLITUS (HCC): ICD-10-CM

## 2019-07-23 DIAGNOSIS — E66.01 MORBID OBESITY DUE TO EXCESS CALORIES (HCC): ICD-10-CM

## 2019-07-23 DIAGNOSIS — E11.69 DYSLIPIDEMIA ASSOCIATED WITH TYPE 2 DIABETES MELLITUS (HCC): ICD-10-CM

## 2019-07-23 DIAGNOSIS — E11.59 TYPE 2 DIABETES MELLITUS WITH VASCULAR DISEASE (HCC): Primary | ICD-10-CM

## 2019-07-23 DIAGNOSIS — I10 ESSENTIAL HYPERTENSION: ICD-10-CM

## 2019-07-23 DIAGNOSIS — E11.42 DIABETIC POLYNEUROPATHY ASSOCIATED WITH TYPE 2 DIABETES MELLITUS (HCC): ICD-10-CM

## 2019-07-23 PROCEDURE — G8417 CALC BMI ABV UP PARAM F/U: HCPCS | Performed by: INTERNAL MEDICINE

## 2019-07-23 PROCEDURE — 3044F HG A1C LEVEL LT 7.0%: CPT | Performed by: INTERNAL MEDICINE

## 2019-07-23 PROCEDURE — G8598 ASA/ANTIPLAT THER USED: HCPCS | Performed by: INTERNAL MEDICINE

## 2019-07-23 PROCEDURE — G8427 DOCREV CUR MEDS BY ELIG CLIN: HCPCS | Performed by: INTERNAL MEDICINE

## 2019-07-23 PROCEDURE — 3017F COLORECTAL CA SCREEN DOC REV: CPT | Performed by: INTERNAL MEDICINE

## 2019-07-23 PROCEDURE — 99214 OFFICE O/P EST MOD 30 MIN: CPT | Performed by: INTERNAL MEDICINE

## 2019-07-23 PROCEDURE — 1036F TOBACCO NON-USER: CPT | Performed by: INTERNAL MEDICINE

## 2019-07-23 PROCEDURE — 6370000000 HC RX 637 (ALT 250 FOR IP): Performed by: EMERGENCY MEDICINE

## 2019-07-23 PROCEDURE — 2022F DILAT RTA XM EVC RTNOPTHY: CPT | Performed by: INTERNAL MEDICINE

## 2019-07-23 RX ORDER — SUB-Q INSULIN DEVICE, 40 UNIT
EACH MISCELLANEOUS
Qty: 30 KIT | Refills: 3 | Status: SHIPPED | OUTPATIENT
Start: 2019-07-23 | End: 2020-01-03

## 2019-07-23 RX ORDER — CEFDINIR 300 MG/1
300 CAPSULE ORAL ONCE
Status: COMPLETED | OUTPATIENT
Start: 2019-07-23 | End: 2019-07-23

## 2019-07-23 RX ORDER — CEFDINIR 300 MG/1
300 CAPSULE ORAL 2 TIMES DAILY
Qty: 14 CAPSULE | Refills: 0 | Status: SHIPPED | OUTPATIENT
Start: 2019-07-23 | End: 2019-07-30

## 2019-07-23 RX ADMIN — CEFDINIR 300 MG: 300 CAPSULE ORAL at 03:07

## 2019-07-23 NOTE — PROGRESS NOTES
on file   Occupational History    Occupation:    Social Needs    Financial resource strain: Not on file    Food insecurity:     Worry: Not on file     Inability: Not on file    Transportation needs:     Medical: Not on file     Non-medical: Not on file   Tobacco Use    Smoking status: Former Smoker     Packs/day: 0.50     Years: 20.00     Pack years: 10.00     Types: Cigarettes, Cigars     Last attempt to quit: 7/3/2014     Years since quittin.0    Smokeless tobacco: Never Used   Substance and Sexual Activity    Alcohol use: No     Alcohol/week: 0.0 standard drinks    Drug use: No    Sexual activity: Never   Lifestyle    Physical activity:     Days per week: Not on file     Minutes per session: Not on file    Stress: Not on file   Relationships    Social connections:     Talks on phone: Not on file     Gets together: Not on file     Attends Rastafari service: Not on file     Active member of club or organization: Not on file     Attends meetings of clubs or organizations: Not on file     Relationship status: Not on file    Intimate partner violence:     Fear of current or ex partner: Not on file     Emotionally abused: Not on file     Physically abused: Not on file     Forced sexual activity: Not on file   Other Topics Concern    Not on file   Social History Narrative    Not on file       Past Medical History:   Diagnosis Date    Abnormal brain MRI 2017    Partially empty sella and minimal chronic small vessel ischemic disease    Acute bilateral low back pain without sciatica 2016    [facility]) [facility] [address] 2017    Arthritis     back    Bipolar disorder ([facility] 10/18/2008    Cancer ([facility] [address]     bilateral breast:s/p lumpectomy/radiation:under care care of breast specialist:Dr. Boone     Carotid stenosis, bilateral:<50%:per US 2016 7/15/2016    Carpal tunnel syndrome 10/18/2008    Cervical cancer screening     Nml per pt'.     DDD (degenerative disc Pump (V-GO 30) KIT, Use once a day. One click for the snack and three clicks for meals, Disp: 30 kit, Rfl: 5    CALCIUM 600-D 600-400 MG-UNIT TABS, TAKE 1 TABLET BY MOUTH TWICE A DAY WITH FOOD, Disp: , Rfl: 11    letrozole (FEMARA) 2.5 MG tablet, TAKE 1 TABLET BY MOUTH EVERY DAY, Disp: , Rfl: 3    glucagon 1 MG injection, Inject 1 mg into the muscle See Admin Instructions Follow package directions for low blood sugar., Disp: 1 kit, Rfl: 0    Insulin Pen Needle (UNIFINE PENTIPS) 32G X 4 MM MISC, 6 times daily for victoza and insulins. , Disp: 200 each, Rfl: 11    clonazePAM (KLONOPIN) 1 MG tablet, Take 1 mg by mouth as needed. ., Disp: , Rfl:     lisinopril (PRINIVIL;ZESTRIL) 10 MG tablet, TAKE 1 TABLET BY MOUTH DAILY, Disp: 30 tablet, Rfl: 0    metFORMIN (GLUCOPHAGE) 1000 MG tablet, TAKE 1 TABLET WITH MEALS TWICE A DAY, Disp: 60 tablet, Rfl: 0    B-D INS SYR ULTRAFINE 1CC/31G 31G X 5/16\" 1 ML MISC, USE THREE TIMES DAILY, Disp: , Rfl: 3    paliperidone (INVEGA) 9 MG extended release tablet, Take 9 mg by mouth every morning , Disp: , Rfl:     simvastatin (ZOCOR) 20 MG tablet, Take 20 mg by mouth nightly , Disp: , Rfl:     aspirin 81 MG tablet, Take 81 mg by mouth daily, Disp: , Rfl:     gabapentin (NEURONTIN) 600 MG tablet, Take 600 mg by mouth 3 times daily, Disp: , Rfl:     oxyCODONE-acetaminophen (PERCOCET) 5-325 MG per tablet, Take 1 tablet by mouth every 6 hours as needed for Pain. ., Disp: , Rfl:     traZODone (DESYREL) 100 MG tablet, Take 100 mg by mouth nightly, Disp: , Rfl:     omeprazole (PRILOSEC) 20 MG capsule, Take 1 capsule by mouth daily, Disp: 30 capsule, Rfl: 0    cefdinir (OMNICEF) 300 MG capsule, Take 1 capsule by mouth 2 times daily for 7 days (Patient not taking: Reported on 7/23/2019), Disp: 14 capsule, Rfl: 0      Review of Systems:    Constitutional: Negative for fever, chills, and unexpected weight change.    HENT: Negative for congestion, ear pain, rhinorrhea,  sore throat and

## 2019-07-23 NOTE — ED PROVIDER NOTES
Negative for chest pain. Gastrointestinal: Positive for abdominal pain (left groin and LLQ ). Negative for constipation, diarrhea, nausea and vomiting. Genitourinary: Negative for difficulty urinating, dysuria, flank pain, menstrual problem, vaginal bleeding and vaginal discharge. Musculoskeletal: Positive for back pain (left lower back) and gait problem (due to left leg pain). Negative for neck pain. Skin: Negative for color change, rash and wound. Neurological: Negative for weakness, numbness and headaches. Psychiatric/Behavioral: Negative for confusion. Positives and Pertinent negatives as per HPI. PASTMEDICAL HISTORY     Past Medical History:   Diagnosis Date    Abnormal brain MRI 7/20/2017    Partially empty sella and minimal chronic small vessel ischemic disease    Acute bilateral low back pain without sciatica 11/2/2016    SHARRON (acute kidney injury) ([facility] 7/5/2017    Arthritis     back    Bipolar disorder ([facility] 10/18/2008    Cancer ([facility] 2015    bilateral breast:s/p lumpectomy/radiation:under care care of breast specialist:Dr. Boone     Carotid stenosis, bilateral:<50%:per US 7/2016 7/15/2016    Carpal tunnel syndrome 10/18/2008    Cervical cancer screening 2014    Nml per pt'.  DDD (degenerative disc disease), lumbar 7/18/2018    Depression     under care of pschiatrist:Dr. Orestes Mcfaddne    Depression/anxiety 7/5/2017    Depression/anxiety     Diabetes mellitus ([facility]     Gout     History of mammogram 10/28/2016;8/14/17    Negative    Hyperlipidemia     Hypertension     Microalbuminuria 7/1/2016    [facility]     Non morbid obesity 7/1/2016    Pancreatitis 5/12/16    [facility] hospitalization 5/12/16-5/16/16:under care of GI:chronic pancreatitis    S/P endoscopy 6/14/2016    [place]:per pt' & her family member was nml.     Scoliosis     Spondylosis of lumbar region without myelopathy or radiculopathy 3/10/2017    Transient cerebral ischemia 07/15/2016 TIA:7/10/16    Unspecified cerebral artery occlusion with cerebral infarction     TIA         SURGICAL HISTORY       Past Surgical History:   Procedure Laterality Date    BREAST LUMPECTOMY  2015    Bilateral:breast cancer    HYSTERECTOMY      Benign:no cervical cancer per pt'    KIDNEY REMOVAL      right    OTHER SURGICAL HISTORY Right     orif right ankle    TUBAL LIGATION           CURRENT MEDICATIONS       Discharge Medication List as of 7/23/2019  3:03 AM      CONTINUE these medications which have NOT CHANGED    Details   !! Insulin Disposable Pump (V-GO 40) KIT Disp-30 kit, R-0, NormalOne VGO 40 daily      Insulin Syringe-Needle U-100 (BD INSULIN SYRINGE U/F) 31G X 5/16\" 0.3 ML MISC Disp-100 each, R-6, NormalUSE 3 TIMES A DAY BEFORE MEALS      TRUE METRIX BLOOD GLUCOSE TEST strip USE AS DIRECTED TO TEST 4 TIMES A DAY, Disp-100 strip, R-5Normal      VICTOZA 18 MG/3ML SOPN SC injection INJECT 1.8 MG INTO THE SKIN DAILY, Disp-3 pen, R-5Normal      insulin lispro (ADMELOG) 100 UNIT/ML injection vial Use for VGO, up to 200 units per day. 30 days: 6 vials, Disp-6 vial, R-2Normal      !! Insulin Disposable Pump (V-GO 30) KIT Disp-30 kit, R-5, NormalUse once a day. One click for the snack and three clicks for meals      CALCIUM 600-D 600-400 MG-UNIT TABS TAKE 1 TABLET BY MOUTH TWICE A DAY WITH FOOD, R-11, DAWHistorical Med      letrozole (FEMARA) 2.5 MG tablet TAKE 1 TABLET BY MOUTH EVERY DAY, R-3Historical Med      glucagon 1 MG injection Inject 1 mg into the muscle See Admin Instructions Follow package directions for low blood sugar., Disp-1 kit, R-0Normal      Insulin Pen Needle (UNIFINE PENTIPS) 32G X 4 MM MISC Disp-200 each, R-11, Normal6 times daily for victoza and insulins. clonazePAM (KLONOPIN) 1 MG tablet Take 1 mg by mouth as needed. Mayra Yoselin Historical Med      lisinopril (PRINIVIL;ZESTRIL) 10 MG tablet TAKE 1 TABLET BY MOUTH DAILY, Disp-30 tablet, R-0Normal      metFORMIN (GLUCOPHAGE) 1000 MG tablet TAKE Substance and Sexual Activity    Alcohol use: No     Alcohol/week: 0.0 standard drinks    Drug use: No    Sexual activity: Never   Lifestyle    Physical activity:     Days per week: None     Minutes per session: None    Stress: None   Relationships    Social connections:     Talks on phone: None     Gets together: None     Attends [employer]ist service: None     Active member of club or organization: None     Attends meetings of clubs or organizations: None     Relationship status: None    Intimate partner violence:     Fear of current or ex partner: None     Emotionally abused: None     Physically abused: None     Forced sexual activity: None   Other Topics Concern    None   Social History Narrative    None       SCREENINGS                PHYSICAL EXAM    (up to 7 for level 4, 8 or more for level 5)     ED Triage Vitals [07/22/19 1929]   BP Temp Temp Source Pulse Resp SpO2 Height Weight   (!) 157/73 98.5 °F (36.9 °C) Oral 81 18 96 % -- 184 lb (83.5 kg)       Physical Exam   Constitutional: She is oriented to person, place, and time. She appears well-developed and well-nourished. She is active and cooperative. Non-toxic appearance. She does not have a sickly appearance. She does not appear ill. No distress. HENT:   Head: Normocephalic and atraumatic. Eyes: Right eye exhibits no discharge. Left eye exhibits no discharge. Neck: Normal range of motion and full passive range of motion without pain. Neck supple. No neck rigidity. No tracheal deviation, no edema, no erythema and normal range of motion present. Cardiovascular: Normal rate, regular rhythm and intact distal pulses. Pulmonary/Chest: Effort normal and breath sounds normal. No accessory muscle usage or stridor. No respiratory distress. She has no decreased breath sounds. She has no wheezes. She has no rhonchi. She has no rales. She exhibits no tenderness. Abdominal: Soft. Bowel sounds are normal. She exhibits no distension.  There is within normal limits    Narrative:     Performed at:  [address], [facility][note 3] [employer]   Phone [phone]   CBC WITH AUTO DIFFERENTIAL - Abnormal; Notable for the following components:    RBC 3.73 (*)     Hemoglobin 10.3 (*)     Hematocrit 32.9 (*)     All other components within normal limits    Narrative:     Performed at:  [address], [facility][note 3] [employer]   Phone [phone]   COMPREHENSIVE METABOLIC PANEL - Abnormal; Notable for the following components:    Glucose 164 (*)     BUN 35 (*)     CREATININE 1.3 (*)     GFR Non- 42 (*)     GFR African American 50 (*)     Alkaline Phosphatase 147 (*)     ALT 48 (*)     All other components within normal limits    Narrative:     Performed at:  [address], [facility][note 3] [employer]   Phone [phone] - Abnormal; Notable for the following components:    Glucose, Ur 100 (*)     Blood, Urine TRACE-INTACT (*)     Protein, UA TRACE (*)     Nitrite, Urine POSITIVE (*)     Leukocyte Esterase, Urine SMALL (*)     All other components within normal limits    Narrative:     Performed at:  [address], [facility][note 3] [employer]   Phone [phone]   MICROSCOPIC URINALYSIS - Abnormal; Notable for the following components:    WBC, UA  (*)     Bacteria, UA 2+ (*)     All other components within normal limits    Narrative:     Performed at:  [address], [facility][note 3] [employer]   Phone [phone]   LIPASE    Narrative:     Performed at:  [address], [facility][note 3] [employer]   Phone [phone]       All other labs were within normal range or not returned asof this dictation. EKG:  All EKG's are interpreted by the Emergency Department Physician who either signs or Co-signs this chart in the absence of a cardiologist.        RADIOLOGY:   Non-plain film images such as CT, Ultrasound and MRI are read by the radiologist. [place] Santi images are visualized and preliminarily interpreted by the  ED Provider with the belowfindings:        Interpretation per the Radiologist below, if available at the time of this note:    CT ABDOMEN PELVIS WO CONTRAST   Final Result   No acute abnormality identified to explain the patient's left lower abdominal   pain and back pain. Redemonstration roxanne atrophy of the right lower chest and abdominal walls. 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 Nonetheless,   correlate with any clinical or laboratory evidence of hepatocellular disease. There is pancreatic ductal dilation which is increased when compared to the   previous exam.  That may relate to progressive chronic pancreatitis, or could   be postobstructive secondary to a ductal calculus. Ductal neoplasm remains a   consideration, and repeat nonemergent MRCP or ERCP is therefore recommended. Patient was made aware of the possible pancreatic lesion and to follow-up with GI for nonemergent MRCP. PROCEDURES   Unless otherwise noted below, none     Procedures    CRITICAL CARE TIME   N/A    CONSULTS: Spoke with Failspepito Guzman at 5596 and with CT scan showing no bony concerns and the pain going on for over 1 month along with no midline discomfort, he felt that following up urgently with an outpatient MRI was appropriate and no need to have an emergent MRI at this time or to admit her, especially with her improvement of symptoms along with no incontinence or saddle anesthesia.   None    EMERGENCY DEPARTMENT COURSE and DIFFERENTIAL DIAGNOSIS/MDM:   Vitals:    Vitals:    07/22/19 2133 07/22/19 2315 07/22/19 2318 07/23/19 0158   BP: (!) 154/69 (!) 128/49 Franklin Armenta ) 142/69 (!) 131/59   Pulse: 81 73  73   Resp: 16 16  15   Temp:    98.6 °F (37 °C)   TempSrc:    Oral   SpO2: 97% 98%  96%   Weight:           Patient was given the following medications:  Medications   ketorolac (TORADOL) injection 15 mg (15 mg Intravenous Given 7/22/19 2155)   diazepam (VALIUM) tablet 5 mg (5 mg Oral Given 7/22/19 2155)   cefdinir (OMNICEF) capsule 300 mg (300 mg Oral Given 7/23/19 5591)     Patient was evaluated related to chronic left buttock pain radiating down the left leg along with some worsening groin pain and abdominal pain in the left lower quadrant over the last 2 days. Urinalysis was concerning for infection which may explain her symptoms. She was started on Omnicef. CT did not show any concerns for new fracture or acute spinal pathology and no significant findings on CT besides incidental finding of possible pancreatic lesion. She knows to follow-up with GI for further evaluation of this and possible MRI. I also gave her a referral to orthopedic spine surgery since initially she did have some left leg weakness although this did improve after pain control. I do feel that spinal epidural abscess is less likely even with the elevated ESR and [facility] physician also feels that she can follow-up as an outpatient at this time based on no significant symptoms along with improvement while in the ED. she understands if she develops any worsening back discomfort with significant weakness in the legs, saddle anesthesia, incontinence, worsening abdominal pain, fever, vomiting, then return to the emergency department, but otherwise follow-up with orthopedics over the next 2 to 4 days for further evaluation and outpatient MRI along with GI for outpatient MRI. She was in no acute distress at time of discharge and felt comfortable with this plan.   Again, she states she chronically does not have good reflexes in her knees and no doctors are able to get the use and story is not suggestive of

## 2019-07-23 NOTE — ED NOTES
Pt sitting in bed eating doritos. I made pt aware that she could not have anything to eat until CT scans were complete.      Florencia Ambrocio, EMILY  71/32/65 1757

## 2019-08-20 ENCOUNTER — OFFICE VISIT (OUTPATIENT)
Dept: ENDOCRINOLOGY | Age: 59
End: 2019-08-20
Payer: MEDICAID

## 2019-08-20 VITALS
BODY MASS INDEX: 32.07 KG/M2 | SYSTOLIC BLOOD PRESSURE: 121 MMHG | DIASTOLIC BLOOD PRESSURE: 78 MMHG | WEIGHT: 181 LBS | HEIGHT: 63 IN | HEART RATE: 74 BPM | OXYGEN SATURATION: 99 %

## 2019-08-20 DIAGNOSIS — E11.59 TYPE 2 DIABETES MELLITUS WITH VASCULAR DISEASE (HCC): ICD-10-CM

## 2019-08-20 DIAGNOSIS — E11.69 DYSLIPIDEMIA ASSOCIATED WITH TYPE 2 DIABETES MELLITUS (HCC): ICD-10-CM

## 2019-08-20 DIAGNOSIS — E78.5 DYSLIPIDEMIA ASSOCIATED WITH TYPE 2 DIABETES MELLITUS (HCC): ICD-10-CM

## 2019-08-20 PROCEDURE — G8417 CALC BMI ABV UP PARAM F/U: HCPCS | Performed by: INTERNAL MEDICINE

## 2019-08-20 PROCEDURE — 3017F COLORECTAL CA SCREEN DOC REV: CPT | Performed by: INTERNAL MEDICINE

## 2019-08-20 PROCEDURE — 2022F DILAT RTA XM EVC RTNOPTHY: CPT | Performed by: INTERNAL MEDICINE

## 2019-08-20 PROCEDURE — G8598 ASA/ANTIPLAT THER USED: HCPCS | Performed by: INTERNAL MEDICINE

## 2019-08-20 PROCEDURE — 99213 OFFICE O/P EST LOW 20 MIN: CPT | Performed by: INTERNAL MEDICINE

## 2019-08-20 PROCEDURE — 1036F TOBACCO NON-USER: CPT | Performed by: INTERNAL MEDICINE

## 2019-08-20 PROCEDURE — 3044F HG A1C LEVEL LT 7.0%: CPT | Performed by: INTERNAL MEDICINE

## 2019-08-20 PROCEDURE — G8427 DOCREV CUR MEDS BY ELIG CLIN: HCPCS | Performed by: INTERNAL MEDICINE

## 2019-08-20 NOTE — PROGRESS NOTES
pt'.    DDD (degenerative disc disease), lumbar 7/18/2018    Depression     under care of pschiatrist:Dr. Shannan Arango    Depression/anxiety 7/5/2017    Depression/anxiety     Diabetes mellitus ([facility]     Gout     History of mammogram 10/28/2016;8/14/17    Negative    Hyperlipidemia     Hypertension     Microalbuminuria 7/1/2016    Neuropathy in diabetes ([facility]     Non morbid obesity 7/1/2016    Pancreatitis 5/12/16    [facility] hospitalization 5/12/16-5/16/16:under care of GI:chronic pancreatitis    S/P endoscopy 6/14/2016    [facility]:per pt' & her family member was nml.     Scoliosis     Spondylosis of lumbar region without myelopathy or radiculopathy 3/10/2017    Transient cerebral ischemia 07/15/2016    TIA:7/10/16    Unspecified cerebral artery occlusion with cerebral infarction     TIA       Past Surgical History:   Procedure Laterality Date    BREAST LUMPECTOMY  2015    Bilateral:breast cancer    HYSTERECTOMY      Benign:no cervical cancer per pt'    KIDNEY REMOVAL      right    OTHER SURGICAL HISTORY Right     orif right ankle    TUBAL LIGATION           Allergies   Allergen Reactions    Morphine Anaphylaxis and Hives     feels like throat is closing    Penicillins Hives and Swelling    Codeine Hives and Rash    Penicillin G Rash         Current Outpatient Medications:     Insulin Disposable Pump (V-GO 20) KIT, Use a new VGO 20 every day (Patient taking differently: Use a new VGO 20 every day : Change click to 5 clicks for breakfast, 4 clicks for lunch and dinner, 2 clicks for snacks), Disp: 30 kit, Rfl: 3    Insulin Syringe-Needle U-100 (BD INSULIN SYRINGE U/F) 31G X 5/16\" 0.3 ML MISC, USE 3 TIMES A DAY BEFORE MEALS, Disp: 100 each, Rfl: 6    TRUE METRIX BLOOD GLUCOSE TEST strip, USE AS DIRECTED TO TEST 4 TIMES A DAY, Disp: 100 strip, Rfl: 5    VICTOZA 18 MG/3ML SOPN SC injection, INJECT 1.8 MG INTO THE SKIN DAILY, Disp: 3 pen, Rfl: 5    insulin lispro (ADMELOG) 100 UNIT/ML injection vial,

## 2019-08-29 ENCOUNTER — HOSPITAL ENCOUNTER (EMERGENCY)
Age: 59
Discharge: HOME | End: 2019-08-29
Attending: EMERGENCY MEDICINE
Payer: MEDICAID

## 2019-08-29 VITALS
RESPIRATION RATE: 16 BRPM | BODY MASS INDEX: 32.78 KG/M2 | WEIGHT: 185 LBS | SYSTOLIC BLOOD PRESSURE: 150 MMHG | HEART RATE: 74 BPM | DIASTOLIC BLOOD PRESSURE: 80 MMHG | OXYGEN SATURATION: 98 % | HEIGHT: 63 IN | TEMPERATURE: 98.6 F

## 2019-08-29 DIAGNOSIS — N64.4 BREAST PAIN, RIGHT: Primary | ICD-10-CM

## 2019-08-29 DIAGNOSIS — N63.10 BREAST MASS, RIGHT: ICD-10-CM

## 2019-08-29 PROCEDURE — 6370000000 HC RX 637 (ALT 250 FOR IP): Performed by: EMERGENCY MEDICINE

## 2019-08-29 PROCEDURE — 99282 EMERGENCY DEPT VISIT SF MDM: CPT

## 2019-08-29 RX ORDER — OXYCODONE HYDROCHLORIDE AND ACETAMINOPHEN 5; 325 MG/1; MG/1
2 TABLET ORAL ONCE
Status: COMPLETED | OUTPATIENT
Start: 2019-08-29 | End: 2019-08-29

## 2019-08-29 RX ADMIN — OXYCODONE HYDROCHLORIDE AND ACETAMINOPHEN 2 TABLET: 5; 325 TABLET ORAL at 05:47

## 2019-08-29 ASSESSMENT — PAIN SCALES - GENERAL
PAINLEVEL_OUTOF10: 6
PAINLEVEL_OUTOF10: 5

## 2019-08-29 ASSESSMENT — PAIN DESCRIPTION - LOCATION: LOCATION: BREAST

## 2019-08-29 ASSESSMENT — PAIN DESCRIPTION - ORIENTATION: ORIENTATION: RIGHT

## 2019-09-02 NOTE — ED PROVIDER NOTES
& her family member was nml.     Scoliosis     Spondylosis of lumbar region without myelopathy or radiculopathy 3/10/2017    Transient cerebral ischemia 07/15/2016    TIA:7/10/16    Unspecified cerebral artery occlusion with cerebral infarction     TIA     Past Surgical History:   Procedure Laterality Date    BREAST LUMPECTOMY      Bilateral:breast cancer    HYSTERECTOMY      Benign:no cervical cancer per pt'    KIDNEY REMOVAL      right    OTHER SURGICAL HISTORY Right     orif right ankle    TUBAL LIGATION       Family History   Problem Relation Age of Onset    Cancer Mother         breast    Cancer Father     Heart Failure Neg Hx     High Cholesterol Neg Hx     Hypertension Neg Hx     Migraines Neg Hx     Rashes/Skin Problems Neg Hx     Seizures Neg Hx     Stroke Neg Hx     Thyroid Disease Neg Hx     Diabetes Neg Hx      Social History     Socioeconomic History    Marital status:      Spouse name: Not on file    Number of children: Not on file    Years of education: Not on file    Highest education level: Not on file   Occupational History    Occupation:    Social Needs    Financial resource strain: Not on file    Food insecurity:     Worry: Not on file     Inability: Not on file    Transportation needs:     Medical: Not on file     Non-medical: Not on file   Tobacco Use    Smoking status: Former Smoker     Packs/day: 0.50     Years: 20.00     Pack years: 10.00     Types: Cigarettes, Cigars     Last attempt to quit: 7/3/2014     Years since quittin.1    Smokeless tobacco: Never Used   Substance and Sexual Activity    Alcohol use: No     Alcohol/week: 0.0 standard drinks    Drug use: No    Sexual activity: Never   Lifestyle    Physical activity:     Days per week: Not on file     Minutes per session: Not on file    Stress: Not on file   Relationships    Social connections:     Talks on phone: Not on file     Gets together: Not on file     Attends Rastafarian service: Not on file     Active member of club or organization: Not on file     Attends meetings of clubs or organizations: Not on file     Relationship status: Not on file    Intimate partner violence:     Fear of current or ex partner: Not on file     Emotionally abused: Not on file     Physically abused: Not on file     Forced sexual activity: Not on file   Other Topics Concern    Not on file   Social History Narrative    Not on file     No current facility-administered medications for this encounter. Current Outpatient Medications   Medication Sig Dispense Refill    Insulin Disposable Pump (V-GO 20) KIT Use a new VGO 20 every day (Patient taking differently: Use a new VGO 20 every day : Change click to 5 clicks for breakfast, 4 clicks for lunch and dinner, 2 clicks for snacks) 30 kit 3    Insulin Syringe-Needle U-100 (BD INSULIN SYRINGE U/F) 31G X 5/16\" 0.3 ML MISC USE 3 TIMES A DAY BEFORE MEALS 100 each 6    TRUE METRIX BLOOD GLUCOSE TEST strip USE AS DIRECTED TO TEST 4 TIMES A  strip 5    VICTOZA 18 MG/3ML SOPN SC injection INJECT 1.8 MG INTO THE SKIN DAILY 3 pen 5    insulin lispro (ADMELOG) 100 UNIT/ML injection vial Use for VGO, up to 200 units per day. 30 days: 6 vials 6 vial 2    CALCIUM 600-D 600-400 MG-UNIT TABS TAKE 1 TABLET BY MOUTH TWICE A DAY WITH FOOD  11    letrozole (FEMARA) 2.5 MG tablet TAKE 1 TABLET BY MOUTH EVERY DAY  3    glucagon 1 MG injection Inject 1 mg into the muscle See Admin Instructions Follow package directions for low blood sugar. 1 kit 0    Insulin Pen Needle (UNIFINE PENTIPS) 32G X 4 MM MISC 6 times daily for victoza and insulins. 200 each 11    clonazePAM (KLONOPIN) 1 MG tablet Take 1 mg by mouth as needed.  Suzanne Venegas lisinopril (PRINIVIL;ZESTRIL) 10 MG tablet TAKE 1 TABLET BY MOUTH DAILY 30 tablet 0    metFORMIN (GLUCOPHAGE) 1000 MG tablet TAKE 1 TABLET WITH MEALS TWICE A DAY 60 tablet 0    B-D INS SYR ULTRAFINE 1CC/31G 31G X 5/16\" 1 ML MISC USE THREE TIMES DAILY  3    paliperidone (INVEGA) 9 MG extended release tablet Take 9 mg by mouth every morning       simvastatin (ZOCOR) 20 MG tablet Take 20 mg by mouth nightly       aspirin 81 MG tablet Take 81 mg by mouth daily      gabapentin (NEURONTIN) 600 MG tablet Take 600 mg by mouth 3 times daily      oxyCODONE-acetaminophen (PERCOCET) 5-325 MG per tablet Take 1 tablet by mouth every 6 hours as needed for Pain. Nina Garter traZODone (DESYREL) 100 MG tablet Take 100 mg by mouth nightly      omeprazole (PRILOSEC) 20 MG capsule Take 1 capsule by mouth daily 30 capsule 0     Allergies   Allergen Reactions    Morphine Anaphylaxis and Hives     feels like throat is closing    Penicillins Hives and Swelling    Codeine Hives and Rash    Penicillin G Rash       REVIEW OF SYSTEMS  10 systems reviewed, pertinent positives per HPI otherwise noted to be negative. PHYSICAL EXAM  BP (!) 150/80   Pulse 74   Temp 98.6 °F (37 °C) (Oral)   Resp 16   Ht 5' 3\" (1.6 m)   Wt 185 lb (83.9 kg)   SpO2 98%   BMI 32.77 kg/m²   GENERAL APPEARANCE: Awake and alert. Cooperative. No acute distress. HEAD: Normocephalic. Atraumatic. EYES: PERRL. EOM's grossly intact. ENT: Mucous membranes are moist.   NECK: Supple. HEART: RRR. CHEST/LUNGS:small hard nodule that is mobile and slightly tender to the right breast at approximately the 11 o'clock position,Old healed surgical scar noted inferior portion of breast on the right. Left breast noted larger than right (chronic), respirations unlabored. CTAB. Good air exchange. Speaking comfortably in full sentences. BACK: No midline spinal tenderness or step-off. ABDOMEN: Soft. Non-distended. Non-tender. No guarding or rebound. Normal bowel sounds. EXTREMITIES: No peripheral edema. Moves all extremities equally. All extremities neurovascularly intact. SKIN: Warm and dry. No acute rashes. NEUROLOGICAL: Alert and oriented. CN 2-12 intact, No gross facial drooping. Strength 5/5, sensation intact. Normal coordination. Gait normal.   PSYCHIATRIC: Normal mood and affect. RADIOLOGY  X-RAYS:  I have reviewed radiologic plain film image(s). ALL OTHER NON-PLAIN FILM IMAGES SUCH AS CT, ULTRASOUND AND MRI HAVE BEEN READ BY THE RADIOLOGIST.  [facility] DIGITAL DIAGNOSTIC AUGMENTED BILATERAL    (Results Pending)        Chaperone for breast exam was my Medical student that was here shadowing me. ED COURSE/MDM  Patient seen and evaluated. Patient does have a very small tender solid nodule to her right breast. Patient is a history of breast cancer in solution is to follow up with her breast oncologist as well as her primary care doctor I did put an order in for a mammogram that I told her for primary care doctor who is able to see our results meet would need to follow up with but it is better to follow up with her doctor or her breast oncologist to facilitate the mammogram.    I estimate there is LOW risk for ACUTE CORONARY SYNDROME, INTRACRANIAL HEMORRHAGE, MALIGNANT DYSRHYTHMIA or HYPERTENSION, PULMONARY EMBOLISM, SEPSIS, SUBARACHNOID HEMORRHAGE, SUBDURAL HEMATOMA, STROKE, or THORACIC AORTIC DISSECTION, thus I consider the discharge disposition reasonable. Christina Pimentel and I have discussed the diagnosis and risks, and we agree with discharging home to follow-up with their primary doctor. We also discussed returning to the Emergency Department immediately if new or worsening symptoms occur. We have discussed the symptoms which are most concerning (e.g., bloody sputum, fever, worsening pain or shortness of breath, vomiting, weakness) that necessitate immediate return. All diagnostic tests reviewed and results discussed with patient. Plan of care discussed with patient. Patient in agreement with plan. Discharge Medication List as of 8/29/2019  4:51 AM          CLINICAL IMPRESSION  1. Breast pain, right    2.  Breast mass, right        Blood pressure (!) 150/80, pulse 74,

## 2019-09-30 PROBLEM — Z86.73 HISTORY OF CVA (CEREBROVASCULAR ACCIDENT) WITHOUT RESIDUAL DEFICITS: Status: ACTIVE | Noted: 2019-07-08

## 2019-09-30 PROBLEM — M54.42 ACUTE BILATERAL LOW BACK PAIN WITH LEFT-SIDED SCIATICA: Status: ACTIVE | Noted: 2017-07-20

## 2019-09-30 PROBLEM — I13.0 HYPERTENSIVE HEART AND KIDNEY DISEASE WITH CHRONIC SYSTOLIC CONGESTIVE HEART FAILURE AND STAGE 3 CHRONIC KIDNEY DISEASE (HCC): Status: ACTIVE | Noted: 2017-09-17

## 2019-09-30 PROBLEM — N18.30 HYPERTENSIVE HEART AND KIDNEY DISEASE WITH CHRONIC SYSTOLIC CONGESTIVE HEART FAILURE AND STAGE 3 CHRONIC KIDNEY DISEASE (HCC): Status: ACTIVE | Noted: 2017-09-17

## 2019-09-30 PROBLEM — I50.22 HYPERTENSIVE HEART AND KIDNEY DISEASE WITH CHRONIC SYSTOLIC CONGESTIVE HEART FAILURE AND STAGE 3 CHRONIC KIDNEY DISEASE (HCC): Status: ACTIVE | Noted: 2017-09-17

## 2019-09-30 PROBLEM — Z90.11 S/P MASTECTOMY, RIGHT: Status: ACTIVE | Noted: 2019-07-08

## 2019-12-04 RX ORDER — LIRAGLUTIDE 6 MG/ML
INJECTION SUBCUTANEOUS
Qty: 3 PEN | Refills: 0 | Status: SHIPPED | OUTPATIENT
Start: 2019-12-04 | End: 2020-06-05

## 2019-12-09 DIAGNOSIS — E11.59 TYPE 2 DIABETES MELLITUS WITH VASCULAR DISEASE (HCC): ICD-10-CM

## 2019-12-09 DIAGNOSIS — E11.42 DIABETIC POLYNEUROPATHY ASSOCIATED WITH TYPE 2 DIABETES MELLITUS (HCC): ICD-10-CM

## 2019-12-10 RX ORDER — SUB-Q INSULIN DEVICE, 40 UNIT
EACH MISCELLANEOUS
Refills: 3 | OUTPATIENT
Start: 2019-12-10

## 2019-12-16 DIAGNOSIS — E11.42 DIABETIC POLYNEUROPATHY ASSOCIATED WITH TYPE 2 DIABETES MELLITUS (HCC): ICD-10-CM

## 2019-12-16 DIAGNOSIS — E11.59 TYPE 2 DIABETES MELLITUS WITH VASCULAR DISEASE (HCC): ICD-10-CM

## 2019-12-17 RX ORDER — SUB-Q INSULIN DEVICE, 40 UNIT
EACH MISCELLANEOUS
Refills: 3 | OUTPATIENT
Start: 2019-12-17

## 2019-12-20 ENCOUNTER — OFFICE VISIT (OUTPATIENT)
Dept: ENDOCRINOLOGY | Age: 59
End: 2019-12-20
Payer: MEDICAID

## 2019-12-20 VITALS
HEART RATE: 76 BPM | DIASTOLIC BLOOD PRESSURE: 68 MMHG | SYSTOLIC BLOOD PRESSURE: 133 MMHG | OXYGEN SATURATION: 97 % | HEIGHT: 63 IN | WEIGHT: 181 LBS | BODY MASS INDEX: 32.07 KG/M2

## 2019-12-20 DIAGNOSIS — I10 ESSENTIAL HYPERTENSION: Chronic | ICD-10-CM

## 2019-12-20 DIAGNOSIS — E11.59 TYPE 2 DIABETES MELLITUS WITH VASCULAR DISEASE (HCC): ICD-10-CM

## 2019-12-20 DIAGNOSIS — E66.01 MORBID OBESITY DUE TO EXCESS CALORIES (HCC): ICD-10-CM

## 2019-12-20 DIAGNOSIS — E78.5 DYSLIPIDEMIA ASSOCIATED WITH TYPE 2 DIABETES MELLITUS (HCC): ICD-10-CM

## 2019-12-20 DIAGNOSIS — E11.69 DYSLIPIDEMIA ASSOCIATED WITH TYPE 2 DIABETES MELLITUS (HCC): ICD-10-CM

## 2019-12-20 PROCEDURE — G8598 ASA/ANTIPLAT THER USED: HCPCS | Performed by: INTERNAL MEDICINE

## 2019-12-20 PROCEDURE — G8427 DOCREV CUR MEDS BY ELIG CLIN: HCPCS | Performed by: INTERNAL MEDICINE

## 2019-12-20 PROCEDURE — 3017F COLORECTAL CA SCREEN DOC REV: CPT | Performed by: INTERNAL MEDICINE

## 2019-12-20 PROCEDURE — 3044F HG A1C LEVEL LT 7.0%: CPT | Performed by: INTERNAL MEDICINE

## 2019-12-20 PROCEDURE — G8417 CALC BMI ABV UP PARAM F/U: HCPCS | Performed by: INTERNAL MEDICINE

## 2019-12-20 PROCEDURE — G8484 FLU IMMUNIZE NO ADMIN: HCPCS | Performed by: INTERNAL MEDICINE

## 2019-12-20 PROCEDURE — 1036F TOBACCO NON-USER: CPT | Performed by: INTERNAL MEDICINE

## 2019-12-20 PROCEDURE — 2022F DILAT RTA XM EVC RTNOPTHY: CPT | Performed by: INTERNAL MEDICINE

## 2019-12-20 PROCEDURE — 99214 OFFICE O/P EST MOD 30 MIN: CPT | Performed by: INTERNAL MEDICINE

## 2019-12-20 RX ORDER — GLIMEPIRIDE 2 MG/1
2 TABLET ORAL
Qty: 30 TABLET | Refills: 5 | Status: SHIPPED | OUTPATIENT
Start: 2019-12-20 | End: 2020-01-03 | Stop reason: SDUPTHER

## 2019-12-20 RX ORDER — ATORVASTATIN CALCIUM 20 MG/1
20 TABLET, FILM COATED ORAL DAILY
Qty: 30 TABLET | Refills: 11 | Status: SHIPPED | OUTPATIENT
Start: 2019-12-20 | End: 2020-06-25

## 2019-12-21 LAB
ESTIMATED AVERAGE GLUCOSE: 194.4 MG/DL
HBA1C MFR BLD: 8.4 %

## 2020-01-03 ENCOUNTER — OFFICE VISIT (OUTPATIENT)
Dept: ENDOCRINOLOGY | Age: 60
End: 2020-01-03
Payer: MEDICAID

## 2020-01-03 VITALS
DIASTOLIC BLOOD PRESSURE: 73 MMHG | WEIGHT: 172.8 LBS | BODY MASS INDEX: 30.61 KG/M2 | HEART RATE: 88 BPM | OXYGEN SATURATION: 97 % | SYSTOLIC BLOOD PRESSURE: 139 MMHG

## 2020-01-03 PROCEDURE — 2022F DILAT RTA XM EVC RTNOPTHY: CPT | Performed by: INTERNAL MEDICINE

## 2020-01-03 PROCEDURE — 1036F TOBACCO NON-USER: CPT | Performed by: INTERNAL MEDICINE

## 2020-01-03 PROCEDURE — 99214 OFFICE O/P EST MOD 30 MIN: CPT | Performed by: INTERNAL MEDICINE

## 2020-01-03 PROCEDURE — G8417 CALC BMI ABV UP PARAM F/U: HCPCS | Performed by: INTERNAL MEDICINE

## 2020-01-03 PROCEDURE — G8427 DOCREV CUR MEDS BY ELIG CLIN: HCPCS | Performed by: INTERNAL MEDICINE

## 2020-01-03 PROCEDURE — G8484 FLU IMMUNIZE NO ADMIN: HCPCS | Performed by: INTERNAL MEDICINE

## 2020-01-03 PROCEDURE — 3046F HEMOGLOBIN A1C LEVEL >9.0%: CPT | Performed by: INTERNAL MEDICINE

## 2020-01-03 PROCEDURE — 3017F COLORECTAL CA SCREEN DOC REV: CPT | Performed by: INTERNAL MEDICINE

## 2020-01-03 RX ORDER — GLIMEPIRIDE 4 MG/1
4 TABLET ORAL
Qty: 30 TABLET | Refills: 2 | Status: SHIPPED | OUTPATIENT
Start: 2020-01-03 | End: 2020-03-09

## 2020-01-03 RX ORDER — CLONAZEPAM 0.5 MG/1
TABLET ORAL
COMMUNITY
Start: 2019-12-26 | End: 2020-01-03

## 2020-01-03 NOTE — PATIENT INSTRUCTIONS
Patient Education        Diabetes Foot Health: Care Instructions  Your Care Instructions    When you have diabetes, your feet need extra care and attention. Diabetes can damage the nerve endings and blood vessels in your feet, making you less likely to notice when your feet are injured. Diabetes also limits your body's ability to fight infection and get blood to areas that need it. If you get a minor foot injury, it could become an ulcer or a serious infection. With good foot care, you can prevent most of these problems. Caring for your feet can be quick and easy. Most of the care can be done when you are bathing or getting ready for bed. Follow-up care is a key part of your treatment and safety. Be sure to make and go to all appointments, and call your doctor if you are having problems. It's also a good idea to know your test results and keep a list of the medicines you take. How can you care for yourself at home? · Keep your blood sugar close to normal by watching what and how much you eat, monitoring blood sugar, taking medicines if prescribed, and getting regular exercise. · Do not smoke. Smoking affects blood flow and can make foot problems worse. If you need help quitting, talk to your doctor about stop-smoking programs and medicines. These can increase your chances of quitting for good. · Eat a diet that is low in fats. High fat intake can cause fat to build up in your blood vessels and decrease blood flow. · Inspect your feet daily for blisters, cuts, cracks, or sores. If you cannot see well, use a mirror or have someone help you. · Take care of your feet:  ? Wash your feet every day. Use warm (not hot) water. Check the water temperature with your wrists or other part of your body, not your feet. ? Dry your feet well. Pat them dry. Do not rub the skin on your feet too hard. Dry well between your toes.  If the skin on your feet stays moist, bacteria or a fungus can grow, which can lead to for early. When should you call for help? Call your doctor now or seek immediate medical care if:    · You have a foot sore, an ulcer or break in the skin that is not healing after 4 days, bleeding corns or calluses, or an ingrown toenail.     · You have blue or black areas, which can mean bruising or blood flow problems.     · You have peeling skin or tiny blisters between your toes or cracking or oozing of the skin.     · You have a fever for more than 24 hours and a foot sore.     · You have new numbness or tingling in your feet that does not go away after you move your feet or change positions.     · You have unexplained or unusual swelling of the foot or ankle.    Watch closely for changes in your health, and be sure to contact your doctor if:    · You cannot do proper foot care. Where can you learn more? Go to [email or web address] and sign in to your [employer] account. Enter A739 in the en-Gauge box to learn more about \"Diabetes Foot Health: Care Instructions. \"     If you do not have an account, please click on the \"Sign Up Now\" link. Current as of: July 25, 2018  Content Version: 12.1  © 8330-4390 Healthwise, Incorporated. Care instructions adapted under license by [facility] [employer] [facility] ([facility]). If you have questions about a medical condition or this instruction, always ask your healthcare professional. [place] [address] any warranty or liability for your use of this information.

## 2020-01-03 NOTE — PROGRESS NOTES
differently: Inject 20 Units into the skin daily Lot # [id number] Ex 08/2021), Disp: 1 pen, Rfl: 0    VICTOZA 18 MG/3ML SOPN SC injection, INJECT 1.8 MG UNDER THE SKIN ONCE DAILY, Disp: 3 pen, Rfl: 0    Insulin Syringe-Needle U-100 (BD INSULIN SYRINGE U/F) 31G X 5/16\" 0.3 ML MISC, USE 3 TIMES A DAY BEFORE MEALS, Disp: 100 each, Rfl: 6    TRUE METRIX BLOOD GLUCOSE TEST strip, USE AS DIRECTED TO TEST 4 TIMES A DAY, Disp: 100 strip, Rfl: 5    CALCIUM 600-D 600-400 MG-UNIT TABS, TAKE 1 TABLET BY MOUTH TWICE A DAY WITH FOOD, Disp: , Rfl: 11    letrozole (FEMARA) 2.5 MG tablet, TAKE 1 TABLET BY MOUTH EVERY DAY, Disp: , Rfl: 3    glucagon 1 MG injection, Inject 1 mg into the muscle See Admin Instructions Follow package directions for low blood sugar., Disp: 1 kit, Rfl: 0    Insulin Pen Needle (UNIFINE PENTIPS) 32G X 4 MM MISC, 6 times daily for victoza and insulins. , Disp: 200 each, Rfl: 11    clonazePAM (KLONOPIN) 1 MG tablet, Take 1 mg by mouth as needed. ., Disp: , Rfl:     lisinopril (PRINIVIL;ZESTRIL) 10 MG tablet, TAKE 1 TABLET BY MOUTH DAILY, Disp: 30 tablet, Rfl: 0    metFORMIN (GLUCOPHAGE) 1000 MG tablet, TAKE 1 TABLET WITH MEALS TWICE A DAY, Disp: 60 tablet, Rfl: 0    B-D INS SYR ULTRAFINE 1CC/31G 31G X 5/16\" 1 ML MISC, USE THREE TIMES DAILY, Disp: , Rfl: 3    paliperidone (INVEGA) 9 MG extended release tablet, Take 9 mg by mouth every morning , Disp: , Rfl:     simvastatin (ZOCOR) 20 MG tablet, Take 20 mg by mouth nightly , Disp: , Rfl:     aspirin 81 MG tablet, Take 81 mg by mouth daily, Disp: , Rfl:     gabapentin (NEURONTIN) 600 MG tablet, Take 600 mg by mouth 3 times daily, Disp: , Rfl:     oxyCODONE-acetaminophen (PERCOCET) 5-325 MG per tablet, Take 1 tablet by mouth every 6 hours as needed for Pain.  ., Disp: , Rfl:     traZODone (DESYREL) 100 MG tablet, Take 100 mg by mouth nightly, Disp: , Rfl:     omeprazole (PRILOSEC) 20 MG capsule, Take 1 capsule by mouth daily, Disp: 30 capsule, Rfl: normal mood and affect.  Patient behavior is normal.     Lab Review:    Orders Only on 12/20/2019   Component Date Value Ref Range Status    Hemoglobin A1C 12/20/2019 8.4  See comment % Final    eAG 12/20/2019 194.4  mg/dL Final   Admission on 07/22/2019, Discharged on 07/23/2019   Component Date Value Ref Range Status    Sed Rate 07/22/2019 42* 0 - 30 mm/Hr Final    WBC 07/22/2019 8.1  4.0 - 11.0 K/uL Final    RBC 07/22/2019 3.73* 4.00 - 5.20 M/uL Final    Hemoglobin 07/22/2019 10.3* 12.0 - 16.0 g/dL Final    Hematocrit 07/22/2019 32.9* 36.0 - 48.0 % Final    MCV 07/22/2019 88.2  80.0 - 100.0 fL Final    MCH 07/22/2019 27.7  26.0 - 34.0 pg Final    MCHC 07/22/2019 31.3  31.0 - 36.0 g/dL Final    RDW 07/22/2019 14.3  12.4 - 15.4 % Final    Platelets 54/97/8869 183  135 - 450 K/uL Final    MPV 07/22/2019 8.4  5.0 - 10.5 fL Final    Neutrophils % 07/22/2019 57.9  % Final    Lymphocytes % 07/22/2019 33.7  % Final    Monocytes % 07/22/2019 7.1  % Final    Eosinophils % 07/22/2019 0.9  % Final    Basophils % 07/22/2019 0.4  % Final    Neutrophils Absolute 07/22/2019 4.7  1.7 - 7.7 K/uL Final    Lymphocytes Absolute 07/22/2019 2.7  1.0 - 5.1 K/uL Final    Monocytes Absolute 07/22/2019 0.6  0.0 - 1.3 K/uL Final    Eosinophils Absolute 07/22/2019 0.1  0.0 - 0.6 K/uL Final    Basophils Absolute 07/22/2019 0.0  0.0 - 0.2 K/uL Final    Sodium 07/22/2019 139  136 - 145 mmol/L Final    Potassium 07/22/2019 4.2  3.5 - 5.1 mmol/L Final    Chloride 07/22/2019 102  99 - 110 mmol/L Final    CO2 07/22/2019 28  21 - 32 mmol/L Final    Anion Gap 07/22/2019 9  3 - 16 Final    Glucose 07/22/2019 164* 70 - 99 mg/dL Final    BUN 07/22/2019 35* 7 - 20 mg/dL Final    CREATININE 07/22/2019 1.3* 0.6 - 1.2 mg/dL Final    GFR Non- 07/22/2019 42* >60 Final    GFR  07/22/2019 50* >60 Final    Calcium 07/22/2019 9.3  8.3 - 10.6 mg/dL Final    Total Protein 07/22/2019 7.6  6.4 - 8.2 136 - 145 mmol/L Final    Potassium 01/18/2019 4.2  3.5 - 5.1 mmol/L Final    Chloride 01/18/2019 96* 99 - 110 mmol/L Final    CO2 01/18/2019 25  21 - 32 mmol/L Final    Anion Gap 01/18/2019 17* 3 - 16 Final    Glucose 01/18/2019 361* 70 - 99 mg/dL Final    BUN 01/18/2019 21* 7 - 20 mg/dL Final    CREATININE 01/18/2019 1.1  0.6 - 1.1 mg/dL Final    GFR Non- 01/18/2019 51* >60 Final    GFR  01/18/2019 >60  >60 Final    Calcium 01/18/2019 11.2* 8.3 - 10.6 mg/dL Final    Total Protein 01/18/2019 8.5* 6.4 - 8.2 g/dL Final    Alb 01/18/2019 4.3  3.4 - 5.0 g/dL Final    Albumin/Globulin Ratio 01/18/2019 1.0* 1.1 - 2.2 Final    Total Bilirubin 01/18/2019 <0.2  0.0 - 1.0 mg/dL Final    Alkaline Phosphatase 01/18/2019 144* 40 - 129 U/L Final    ALT 01/18/2019 32  10 - 40 U/L Final    AST 01/18/2019 22  15 - 37 U/L Final    Globulin 01/18/2019 4.2  g/dL Final    Microalbumin, Random Urine 01/18/2019 2.70* <2.0 mg/dL Final    Creatinine, Ur 01/18/2019 57.0  28.0 - 259.0 mg/dL Final    Microalbumin Creatinine Ratio 01/18/2019 47.4* 0.0 - 30.0 mg/g Final           Assessment and Plan     Kailash Awad was seen today for diabetes. Diagnoses and all orders for this visit:    Dyslipidemia associated with type 2 diabetes mellitus ([address]    Uncontrolled type 2 diabetes mellitus with microalbuminuria, with long-term current use of insulin (HCC)  -     glimepiride (AMARYL) 4 MG tablet; Take 1 tablet by mouth every morning (before breakfast)  -     insulin glargine (BASAGLAR KWIKPEN) 100 UNIT/ML injection pen; Inject 25 Units into the skin every morning    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 (HCC)  -     glimepiride (AMARYL) 4 MG tablet; Take 1 tablet by mouth every morning (before breakfast)  -     insulin glargine (BASAGLAR KWIKPEN) 100 UNIT/ML injection pen;  Inject 25 Units into the skin every morning    Uncontrolled type 2 diabetes mellitus with diabetic nephropathy, with long-term current use of insulin (HCC)  -     glimepiride (AMARYL) 4 MG tablet; Take 1 tablet by mouth every morning (before breakfast)  -     insulin glargine (BASAGLAR KWIKPEN) 100 UNIT/ML injection pen; Inject 25 Units into the skin every morning    Essential hypertension          1: Type 2 DM complicated with nephropathy, neuropathy TIAs, carotid stenosis   Uncontrolled A1C 8.4%<  6.8% < 10.1%  << 8.1 <<  8% << 11.7%    A1C of <8 would be acceptable because of microvascular and macrovascular complications. Poor adherence to medical plan, treatment adherence and diet plan   I think she needs help at home, either a family member needs to move or she needs to go to a nursing home     Lyric He not approved     Need to improve adherence to meds/insulins     Increase Tresiba to 25 units in am. It is difficult for her to use vial. Giving pen sample today. Increase Amaryl to 4 mg with first meal of the day     C/w Metformin 1000mg bid   C/w Victoza 1.8mg in am     All instructions provided in written. Check Blood sugars 4 times per day. Log them along with insulin and send them every 2 weeks. Call for blood sugars less than 60 or more than 400. Eye exam: Last exam in March 12th 2018, Needs an exam now. Foot exam:  March 2020    Deformity/amputation: absent  Skin lesions/pre-ulcerative calluses: absent  Edema: right- negative, left- negative  Sensory exam: Monofilament sensation: normal  Plus at least one of the following:   Pulses: normal,   Vibration (128 Hz):  Moderately decreased     Renal screen: High 68 Feb 2018, high 47 Jan 2019    Counselled for smoking cessation     TSH screen: Feb 2018   Saw CDE in 2016 with Rd.     2: HTN   Controlled     3: Hyperlipidemia   LDL: 45 , HDL: 45 , TGs: 130 Jan 2019     Leg pains present   Continue atorvastatin 20mg daily     RTC in 3 weeks with logs    Labs on the visit     EDUCATION:   Greater than 50% of this follow-up visit was spent in general counseling

## 2020-01-07 ENCOUNTER — TELEPHONE (OUTPATIENT)
Dept: ENDOCRINOLOGY | Age: 60
End: 2020-01-07

## 2020-01-22 ENCOUNTER — HOSPITAL ENCOUNTER (INPATIENT)
Age: 60
LOS: 1 days | Discharge: HOME | DRG: 420 | End: 2020-01-23
Attending: EMERGENCY MEDICINE | Admitting: FAMILY MEDICINE
Payer: MEDICAID

## 2020-01-22 ENCOUNTER — TELEPHONE (OUTPATIENT)
Dept: ENDOCRINOLOGY | Age: 60
End: 2020-01-22

## 2020-01-22 PROBLEM — N30.00 ACUTE CYSTITIS WITHOUT HEMATURIA: Status: ACTIVE | Noted: 2020-01-22

## 2020-01-22 LAB
A/G RATIO: 1.2 (ref 1.1–2.2)
ALBUMIN SERPL-MCNC: 3.9 G/DL (ref 3.4–5)
ALP BLD-CCNC: 158 U/L (ref 40–129)
ALT SERPL-CCNC: 37 U/L (ref 10–40)
ANION GAP SERPL CALCULATED.3IONS-SCNC: 13 MMOL/L (ref 3–16)
ANION GAP SERPL CALCULATED.3IONS-SCNC: 8 MMOL/L (ref 3–16)
AST SERPL-CCNC: 30 U/L (ref 15–37)
BACTERIA: ABNORMAL /HPF
BASE EXCESS VENOUS: -2.5 MMOL/L (ref -3–3)
BASOPHILS ABSOLUTE: 0 K/UL (ref 0–0.2)
BASOPHILS RELATIVE PERCENT: 0.2 %
BILIRUB SERPL-MCNC: <0.2 MG/DL (ref 0–1)
BILIRUBIN URINE: NEGATIVE
BLOOD, URINE: NEGATIVE
BUN BLDV-MCNC: 15 MG/DL (ref 7–20)
BUN BLDV-MCNC: 15 MG/DL (ref 7–20)
CALCIUM SERPL-MCNC: 8.3 MG/DL (ref 8.3–10.6)
CALCIUM SERPL-MCNC: 8.9 MG/DL (ref 8.3–10.6)
CARBOXYHEMOGLOBIN: 1.8 % (ref 0–1.5)
CHLORIDE BLD-SCNC: 89 MMOL/L (ref 99–110)
CHLORIDE BLD-SCNC: 95 MMOL/L (ref 99–110)
CLARITY: CLEAR
CO2: 23 MMOL/L (ref 21–32)
CO2: 25 MMOL/L (ref 21–32)
COLOR: YELLOW
CREAT SERPL-MCNC: 1.2 MG/DL (ref 0.6–1.2)
CREAT SERPL-MCNC: 1.3 MG/DL (ref 0.6–1.2)
EOSINOPHILS ABSOLUTE: 0 K/UL (ref 0–0.6)
EOSINOPHILS RELATIVE PERCENT: 0.6 %
GFR AFRICAN AMERICAN: 50
GFR AFRICAN AMERICAN: 55
GFR NON-AFRICAN AMERICAN: 42
GFR NON-AFRICAN AMERICAN: 46
GLOBULIN: 3.2 G/DL
GLUCOSE BLD-MCNC: 382 MG/DL (ref 70–99)
GLUCOSE BLD-MCNC: 529 MG/DL
GLUCOSE BLD-MCNC: 529 MG/DL (ref 70–99)
GLUCOSE BLD-MCNC: 607 MG/DL (ref 70–99)
GLUCOSE BLD-MCNC: 731 MG/DL (ref 70–99)
GLUCOSE BLD-MCNC: >600 MG/DL (ref 70–99)
GLUCOSE URINE: >=1000 MG/DL
HCO3 VENOUS: 24.8 MMOL/L (ref 23–29)
HCT VFR BLD CALC: 35.6 % (ref 36–48)
HEMOGLOBIN: 11.3 G/DL (ref 12–16)
KETONES, URINE: NEGATIVE MG/DL
LACTIC ACID, SEPSIS: 2.8 MMOL/L (ref 0.4–1.9)
LEUKOCYTE ESTERASE, URINE: NEGATIVE
LYMPHOCYTES ABSOLUTE: 1.7 K/UL (ref 1–5.1)
LYMPHOCYTES RELATIVE PERCENT: 28.9 %
MCH RBC QN AUTO: 27.4 PG (ref 26–34)
MCHC RBC AUTO-ENTMCNC: 31.7 G/DL (ref 31–36)
MCV RBC AUTO: 86.4 FL (ref 80–100)
METHEMOGLOBIN VENOUS: 0.7 %
MICROSCOPIC EXAMINATION: YES
MONOCYTES ABSOLUTE: 0.3 K/UL (ref 0–1.3)
MONOCYTES RELATIVE PERCENT: 5.2 %
NEUTROPHILS ABSOLUTE: 3.9 K/UL (ref 1.7–7.7)
NEUTROPHILS RELATIVE PERCENT: 65.1 %
NITRITE, URINE: POSITIVE
O2 CONTENT, VEN: 14 VOL %
O2 SAT, VEN: 81 %
O2 THERAPY: ABNORMAL
PCO2, VEN: 54.2 MMHG (ref 40–50)
PDW BLD-RTO: 13.2 % (ref 12.4–15.4)
PERFORMED ON: ABNORMAL
PH UA: 5 (ref 5–8)
PH VENOUS: 7.28 (ref 7.35–7.45)
PLATELET # BLD: 156 K/UL (ref 135–450)
PMV BLD AUTO: 10.6 FL (ref 5–10.5)
PO2, VEN: 47.1 MMHG (ref 25–40)
POTASSIUM SERPL-SCNC: 4.2 MMOL/L (ref 3.5–5.1)
POTASSIUM SERPL-SCNC: 4.2 MMOL/L (ref 3.5–5.1)
PROTEIN UA: NEGATIVE MG/DL
RBC # BLD: 4.11 M/UL (ref 4–5.2)
RBC UA: ABNORMAL /HPF (ref 0–2)
SODIUM BLD-SCNC: 126 MMOL/L (ref 136–145)
SODIUM BLD-SCNC: 127 MMOL/L (ref 136–145)
SPECIFIC GRAVITY UA: <=1.005 (ref 1–1.03)
TCO2 CALC VENOUS: 27 MMOL/L
TOTAL PROTEIN: 7.1 G/DL (ref 6.4–8.2)
URINE TYPE: ABNORMAL
UROBILINOGEN, URINE: 0.2 E.U./DL
WBC # BLD: 6 K/UL (ref 4–11)
WBC UA: ABNORMAL /HPF (ref 0–5)

## 2020-01-22 PROCEDURE — G0378 HOSPITAL OBSERVATION PER HR: HCPCS

## 2020-01-22 PROCEDURE — 81001 URINALYSIS AUTO W/SCOPE: CPT

## 2020-01-22 PROCEDURE — 96375 TX/PRO/DX INJ NEW DRUG ADDON: CPT

## 2020-01-22 PROCEDURE — 6370000000 HC RX 637 (ALT 250 FOR IP): Performed by: EMERGENCY MEDICINE

## 2020-01-22 PROCEDURE — 1200000000 HC SEMI PRIVATE

## 2020-01-22 PROCEDURE — 2580000003 HC RX 258: Performed by: STUDENT IN AN ORGANIZED HEALTH CARE EDUCATION/TRAINING PROGRAM

## 2020-01-22 PROCEDURE — 82803 BLOOD GASES ANY COMBINATION: CPT

## 2020-01-22 PROCEDURE — 2580000003 HC RX 258: Performed by: EMERGENCY MEDICINE

## 2020-01-22 PROCEDURE — 6370000000 HC RX 637 (ALT 250 FOR IP): Performed by: STUDENT IN AN ORGANIZED HEALTH CARE EDUCATION/TRAINING PROGRAM

## 2020-01-22 PROCEDURE — 96374 THER/PROPH/DIAG INJ IV PUSH: CPT

## 2020-01-22 PROCEDURE — 96372 THER/PROPH/DIAG INJ SC/IM: CPT

## 2020-01-22 PROCEDURE — 96361 HYDRATE IV INFUSION ADD-ON: CPT

## 2020-01-22 PROCEDURE — 83605 ASSAY OF LACTIC ACID: CPT

## 2020-01-22 PROCEDURE — 80053 COMPREHEN METABOLIC PANEL: CPT

## 2020-01-22 PROCEDURE — 93005 ELECTROCARDIOGRAM TRACING: CPT | Performed by: EMERGENCY MEDICINE

## 2020-01-22 PROCEDURE — 6360000002 HC RX W HCPCS: Performed by: EMERGENCY MEDICINE

## 2020-01-22 PROCEDURE — 99285 EMERGENCY DEPT VISIT HI MDM: CPT

## 2020-01-22 PROCEDURE — 85025 COMPLETE CBC W/AUTO DIFF WBC: CPT

## 2020-01-22 RX ORDER — SODIUM CHLORIDE 0.9 % (FLUSH) 0.9 %
10 SYRINGE (ML) INJECTION PRN
Status: DISCONTINUED | OUTPATIENT
Start: 2020-01-22 | End: 2020-01-23 | Stop reason: HOSPADM

## 2020-01-22 RX ORDER — DEXTROSE MONOHYDRATE 25 G/50ML
12.5 INJECTION, SOLUTION INTRAVENOUS PRN
Status: DISCONTINUED | OUTPATIENT
Start: 2020-01-22 | End: 2020-01-23 | Stop reason: HOSPADM

## 2020-01-22 RX ORDER — TRAZODONE HYDROCHLORIDE 50 MG/1
100 TABLET ORAL NIGHTLY
Status: DISCONTINUED | OUTPATIENT
Start: 2020-01-22 | End: 2020-01-23 | Stop reason: HOSPADM

## 2020-01-22 RX ORDER — NICOTINE POLACRILEX 4 MG
15 LOZENGE BUCCAL PRN
Status: DISCONTINUED | OUTPATIENT
Start: 2020-01-22 | End: 2020-01-23 | Stop reason: HOSPADM

## 2020-01-22 RX ORDER — ONDANSETRON 2 MG/ML
4 INJECTION INTRAMUSCULAR; INTRAVENOUS EVERY 6 HOURS PRN
Status: DISCONTINUED | OUTPATIENT
Start: 2020-01-22 | End: 2020-01-23 | Stop reason: HOSPADM

## 2020-01-22 RX ORDER — CLONAZEPAM 1 MG/1
1 TABLET ORAL 2 TIMES DAILY PRN
Status: DISCONTINUED | OUTPATIENT
Start: 2020-01-22 | End: 2020-01-23 | Stop reason: HOSPADM

## 2020-01-22 RX ORDER — LISINOPRIL 10 MG/1
10 TABLET ORAL DAILY
Status: DISCONTINUED | OUTPATIENT
Start: 2020-01-23 | End: 2020-01-23 | Stop reason: HOSPADM

## 2020-01-22 RX ORDER — INSULIN GLARGINE 100 [IU]/ML
25 INJECTION, SOLUTION SUBCUTANEOUS EVERY MORNING
Status: DISCONTINUED | OUTPATIENT
Start: 2020-01-23 | End: 2020-01-23 | Stop reason: HOSPADM

## 2020-01-22 RX ORDER — SODIUM CHLORIDE 0.9 % (FLUSH) 0.9 %
10 SYRINGE (ML) INJECTION EVERY 12 HOURS SCHEDULED
Status: DISCONTINUED | OUTPATIENT
Start: 2020-01-22 | End: 2020-01-23 | Stop reason: HOSPADM

## 2020-01-22 RX ORDER — OXYCODONE HYDROCHLORIDE AND ACETAMINOPHEN 5; 325 MG/1; MG/1
1 TABLET ORAL EVERY 6 HOURS PRN
Status: DISCONTINUED | OUTPATIENT
Start: 2020-01-22 | End: 2020-01-23 | Stop reason: HOSPADM

## 2020-01-22 RX ORDER — FENTANYL CITRATE 50 UG/ML
50 INJECTION, SOLUTION INTRAMUSCULAR; INTRAVENOUS ONCE
Status: COMPLETED | OUTPATIENT
Start: 2020-01-22 | End: 2020-01-22

## 2020-01-22 RX ORDER — ACETAMINOPHEN 325 MG/1
650 TABLET ORAL EVERY 4 HOURS PRN
Status: DISCONTINUED | OUTPATIENT
Start: 2020-01-22 | End: 2020-01-23 | Stop reason: HOSPADM

## 2020-01-22 RX ORDER — 0.9 % SODIUM CHLORIDE 0.9 %
1000 INTRAVENOUS SOLUTION INTRAVENOUS ONCE
Status: COMPLETED | OUTPATIENT
Start: 2020-01-22 | End: 2020-01-23

## 2020-01-22 RX ORDER — PANTOPRAZOLE SODIUM 40 MG/1
40 TABLET, DELAYED RELEASE ORAL
Status: DISCONTINUED | OUTPATIENT
Start: 2020-01-23 | End: 2020-01-23 | Stop reason: HOSPADM

## 2020-01-22 RX ORDER — ATORVASTATIN CALCIUM 10 MG/1
20 TABLET, FILM COATED ORAL DAILY
Status: DISCONTINUED | OUTPATIENT
Start: 2020-01-23 | End: 2020-01-23 | Stop reason: HOSPADM

## 2020-01-22 RX ORDER — LETROZOLE 2.5 MG/1
2.5 TABLET, FILM COATED ORAL DAILY
Status: DISCONTINUED | OUTPATIENT
Start: 2020-01-23 | End: 2020-01-23 | Stop reason: HOSPADM

## 2020-01-22 RX ORDER — CIPROFLOXACIN 2 MG/ML
200 INJECTION, SOLUTION INTRAVENOUS EVERY 12 HOURS
Status: DISCONTINUED | OUTPATIENT
Start: 2020-01-22 | End: 2020-01-23 | Stop reason: HOSPADM

## 2020-01-22 RX ORDER — DEXTROSE MONOHYDRATE 50 MG/ML
100 INJECTION, SOLUTION INTRAVENOUS PRN
Status: DISCONTINUED | OUTPATIENT
Start: 2020-01-22 | End: 2020-01-23 | Stop reason: HOSPADM

## 2020-01-22 RX ORDER — INSULIN GLARGINE 100 [IU]/ML
10 INJECTION, SOLUTION SUBCUTANEOUS ONCE
Status: COMPLETED | OUTPATIENT
Start: 2020-01-22 | End: 2020-01-23

## 2020-01-22 RX ORDER — 0.9 % SODIUM CHLORIDE 0.9 %
1000 INTRAVENOUS SOLUTION INTRAVENOUS ONCE
Status: COMPLETED | OUTPATIENT
Start: 2020-01-22 | End: 2020-01-22

## 2020-01-22 RX ORDER — ASPIRIN 81 MG/1
81 TABLET ORAL DAILY
Status: DISCONTINUED | OUTPATIENT
Start: 2020-01-23 | End: 2020-01-23 | Stop reason: HOSPADM

## 2020-01-22 RX ORDER — SODIUM CHLORIDE 9 MG/ML
INJECTION, SOLUTION INTRAVENOUS CONTINUOUS
Status: DISCONTINUED | OUTPATIENT
Start: 2020-01-22 | End: 2020-01-23 | Stop reason: HOSPADM

## 2020-01-22 RX ORDER — GABAPENTIN 300 MG/1
600 CAPSULE ORAL 2 TIMES DAILY
Status: DISCONTINUED | OUTPATIENT
Start: 2020-01-22 | End: 2020-01-23 | Stop reason: HOSPADM

## 2020-01-22 RX ORDER — PALIPERIDONE 3 MG/1
9 TABLET, EXTENDED RELEASE ORAL EVERY MORNING
Status: DISCONTINUED | OUTPATIENT
Start: 2020-01-23 | End: 2020-01-23 | Stop reason: HOSPADM

## 2020-01-22 RX ADMIN — SODIUM CHLORIDE 1000 ML: 9 INJECTION, SOLUTION INTRAVENOUS at 23:34

## 2020-01-22 RX ADMIN — INSULIN HUMAN 10 UNITS: 100 INJECTION, SOLUTION PARENTERAL at 20:55

## 2020-01-22 RX ADMIN — SODIUM CHLORIDE 1000 ML: 9 INJECTION, SOLUTION INTRAVENOUS at 20:27

## 2020-01-22 RX ADMIN — SODIUM CHLORIDE: 9 INJECTION, SOLUTION INTRAVENOUS at 23:33

## 2020-01-22 RX ADMIN — GABAPENTIN 600 MG: 300 CAPSULE ORAL at 23:34

## 2020-01-22 RX ADMIN — OXYCODONE HYDROCHLORIDE AND ACETAMINOPHEN 1 TABLET: 5; 325 TABLET ORAL at 23:34

## 2020-01-22 RX ADMIN — INSULIN HUMAN 10 UNITS: 100 INJECTION, SOLUTION PARENTERAL at 20:25

## 2020-01-22 RX ADMIN — TRAZODONE HYDROCHLORIDE 100 MG: 50 TABLET ORAL at 23:33

## 2020-01-22 RX ADMIN — INSULIN LISPRO 7 UNITS: 100 INJECTION, SOLUTION INTRAVENOUS; SUBCUTANEOUS at 23:30

## 2020-01-22 RX ADMIN — FENTANYL CITRATE 50 MCG: 50 INJECTION, SOLUTION INTRAMUSCULAR; INTRAVENOUS at 20:56

## 2020-01-22 ASSESSMENT — PAIN SCALES - GENERAL
PAINLEVEL_OUTOF10: 0
PAINLEVEL_OUTOF10: 8
PAINLEVEL_OUTOF10: 10
PAINLEVEL_OUTOF10: 4

## 2020-01-22 ASSESSMENT — PAIN DESCRIPTION - PAIN TYPE
TYPE: ACUTE PAIN
TYPE: ACUTE PAIN

## 2020-01-22 ASSESSMENT — PAIN DESCRIPTION - FREQUENCY: FREQUENCY: CONTINUOUS

## 2020-01-22 ASSESSMENT — PAIN DESCRIPTION - LOCATION
LOCATION: HEAD
LOCATION: HEAD

## 2020-01-22 ASSESSMENT — PAIN DESCRIPTION - ORIENTATION: ORIENTATION: MID

## 2020-01-22 ASSESSMENT — PAIN DESCRIPTION - ONSET: ONSET: ON-GOING

## 2020-01-22 ASSESSMENT — PAIN DESCRIPTION - DESCRIPTORS: DESCRIPTORS: ACHING

## 2020-01-23 ENCOUNTER — TELEPHONE (OUTPATIENT)
Dept: ENDOCRINOLOGY | Age: 60
End: 2020-01-23

## 2020-01-23 VITALS
DIASTOLIC BLOOD PRESSURE: 77 MMHG | HEART RATE: 68 BPM | BODY MASS INDEX: 30.65 KG/M2 | SYSTOLIC BLOOD PRESSURE: 132 MMHG | RESPIRATION RATE: 16 BRPM | TEMPERATURE: 98.9 F | OXYGEN SATURATION: 96 % | HEIGHT: 63 IN | WEIGHT: 173 LBS

## 2020-01-23 PROBLEM — E83.42 HYPOMAGNESEMIA: Status: ACTIVE | Noted: 2020-01-23

## 2020-01-23 PROBLEM — E11.01 HHNC (HYPERGLYCEMIC HYPEROSMOLAR NONKETOTIC COMA) (HCC): Status: RESOLVED | Noted: 2017-07-05 | Resolved: 2020-01-23

## 2020-01-23 LAB
A/G RATIO: 1.1 (ref 1.1–2.2)
ALBUMIN SERPL-MCNC: 3.1 G/DL (ref 3.4–5)
ALP BLD-CCNC: 126 U/L (ref 40–129)
ALT SERPL-CCNC: 28 U/L (ref 10–40)
ANION GAP SERPL CALCULATED.3IONS-SCNC: 9 MMOL/L (ref 3–16)
AST SERPL-CCNC: 19 U/L (ref 15–37)
BASOPHILS ABSOLUTE: 0 K/UL (ref 0–0.2)
BASOPHILS RELATIVE PERCENT: 0.9 %
BILIRUB SERPL-MCNC: <0.2 MG/DL (ref 0–1)
BUN BLDV-MCNC: 14 MG/DL (ref 7–20)
CALCIUM SERPL-MCNC: 8.4 MG/DL (ref 8.3–10.6)
CHLORIDE BLD-SCNC: 108 MMOL/L (ref 99–110)
CO2: 23 MMOL/L (ref 21–32)
CREAT SERPL-MCNC: 1.1 MG/DL (ref 0.6–1.2)
EKG ATRIAL RATE: 68 BPM
EKG DIAGNOSIS: NORMAL
EKG P AXIS: 37 DEGREES
EKG P-R INTERVAL: 160 MS
EKG Q-T INTERVAL: 400 MS
EKG QRS DURATION: 74 MS
EKG QTC CALCULATION (BAZETT): 425 MS
EKG R AXIS: -11 DEGREES
EKG T AXIS: 41 DEGREES
EKG VENTRICULAR RATE: 68 BPM
EOSINOPHILS ABSOLUTE: 0.1 K/UL (ref 0–0.6)
EOSINOPHILS RELATIVE PERCENT: 1.4 %
GFR AFRICAN AMERICAN: >60
GFR NON-AFRICAN AMERICAN: 51
GLOBULIN: 2.8 G/DL
GLUCOSE BLD-MCNC: 108 MG/DL (ref 70–99)
GLUCOSE BLD-MCNC: 131 MG/DL (ref 70–99)
GLUCOSE BLD-MCNC: 228 MG/DL (ref 70–99)
GLUCOSE BLD-MCNC: 362 MG/DL (ref 70–99)
GLUCOSE BLD-MCNC: 53 MG/DL (ref 70–99)
GLUCOSE BLD-MCNC: 76 MG/DL (ref 70–99)
HCT VFR BLD CALC: 31 % (ref 36–48)
HEMOGLOBIN: 10.1 G/DL (ref 12–16)
LACTIC ACID, SEPSIS: 1.2 MMOL/L (ref 0.4–1.9)
LACTIC ACID, SEPSIS: 2.3 MMOL/L (ref 0.4–1.9)
LYMPHOCYTES ABSOLUTE: 2.6 K/UL (ref 1–5.1)
LYMPHOCYTES RELATIVE PERCENT: 50.7 %
MAGNESIUM: 1.6 MG/DL (ref 1.8–2.4)
MCH RBC QN AUTO: 27.6 PG (ref 26–34)
MCHC RBC AUTO-ENTMCNC: 32.7 G/DL (ref 31–36)
MCV RBC AUTO: 84.5 FL (ref 80–100)
MONOCYTES ABSOLUTE: 0.4 K/UL (ref 0–1.3)
MONOCYTES RELATIVE PERCENT: 7.7 %
NEUTROPHILS ABSOLUTE: 2 K/UL (ref 1.7–7.7)
NEUTROPHILS RELATIVE PERCENT: 39.3 %
PDW BLD-RTO: 13.2 % (ref 12.4–15.4)
PERFORMED ON: ABNORMAL
PERFORMED ON: NORMAL
PLATELET # BLD: 131 K/UL (ref 135–450)
PMV BLD AUTO: 10.5 FL (ref 5–10.5)
POTASSIUM REFLEX MAGNESIUM: 3.5 MMOL/L (ref 3.5–5.1)
RBC # BLD: 3.67 M/UL (ref 4–5.2)
SODIUM BLD-SCNC: 140 MMOL/L (ref 136–145)
TOTAL PROTEIN: 5.9 G/DL (ref 6.4–8.2)
WBC # BLD: 5.1 K/UL (ref 4–11)

## 2020-01-23 PROCEDURE — G0378 HOSPITAL OBSERVATION PER HR: HCPCS

## 2020-01-23 PROCEDURE — 83735 ASSAY OF MAGNESIUM: CPT

## 2020-01-23 PROCEDURE — 6370000000 HC RX 637 (ALT 250 FOR IP): Performed by: STUDENT IN AN ORGANIZED HEALTH CARE EDUCATION/TRAINING PROGRAM

## 2020-01-23 PROCEDURE — 96361 HYDRATE IV INFUSION ADD-ON: CPT

## 2020-01-23 PROCEDURE — 6360000002 HC RX W HCPCS: Performed by: FAMILY MEDICINE

## 2020-01-23 PROCEDURE — 87077 CULTURE AEROBIC IDENTIFY: CPT

## 2020-01-23 PROCEDURE — 2580000003 HC RX 258: Performed by: STUDENT IN AN ORGANIZED HEALTH CARE EDUCATION/TRAINING PROGRAM

## 2020-01-23 PROCEDURE — 93010 ELECTROCARDIOGRAM REPORT: CPT | Performed by: INTERNAL MEDICINE

## 2020-01-23 PROCEDURE — 96365 THER/PROPH/DIAG IV INF INIT: CPT

## 2020-01-23 PROCEDURE — 90686 IIV4 VACC NO PRSV 0.5 ML IM: CPT | Performed by: FAMILY MEDICINE

## 2020-01-23 PROCEDURE — 96372 THER/PROPH/DIAG INJ SC/IM: CPT

## 2020-01-23 PROCEDURE — 87186 SC STD MICRODIL/AGAR DIL: CPT

## 2020-01-23 PROCEDURE — 87086 URINE CULTURE/COLONY COUNT: CPT

## 2020-01-23 PROCEDURE — 80053 COMPREHEN METABOLIC PANEL: CPT

## 2020-01-23 PROCEDURE — G0008 ADMIN INFLUENZA VIRUS VAC: HCPCS | Performed by: FAMILY MEDICINE

## 2020-01-23 PROCEDURE — 6360000002 HC RX W HCPCS: Performed by: STUDENT IN AN ORGANIZED HEALTH CARE EDUCATION/TRAINING PROGRAM

## 2020-01-23 PROCEDURE — 36415 COLL VENOUS BLD VENIPUNCTURE: CPT

## 2020-01-23 PROCEDURE — 96366 THER/PROPH/DIAG IV INF ADDON: CPT

## 2020-01-23 PROCEDURE — 85025 COMPLETE CBC W/AUTO DIFF WBC: CPT

## 2020-01-23 PROCEDURE — 83605 ASSAY OF LACTIC ACID: CPT

## 2020-01-23 RX ORDER — CIPROFLOXACIN 500 MG/1
500 TABLET, FILM COATED ORAL 2 TIMES DAILY
Qty: 14 TABLET | Refills: 0 | Status: SHIPPED | OUTPATIENT
Start: 2020-01-23 | End: 2020-01-30

## 2020-01-23 RX ORDER — INSULIN LISPRO 100 [IU]/ML
0-18 INJECTION, SOLUTION INTRAVENOUS; SUBCUTANEOUS
Qty: 5 PEN | Refills: 0 | Status: SHIPPED | OUTPATIENT
Start: 2020-01-23 | End: 2020-03-06

## 2020-01-23 RX ADMIN — ASPIRIN 81 MG: 81 TABLET, COATED ORAL at 08:36

## 2020-01-23 RX ADMIN — ATORVASTATIN CALCIUM 20 MG: 10 TABLET, FILM COATED ORAL at 08:35

## 2020-01-23 RX ADMIN — INSULIN LISPRO 6 UNITS: 100 INJECTION, SOLUTION INTRAVENOUS; SUBCUTANEOUS at 17:01

## 2020-01-23 RX ADMIN — PALIPERIDONE 9 MG: 3 TABLET, EXTENDED RELEASE ORAL at 08:47

## 2020-01-23 RX ADMIN — LETROZOLE 2.5 MG: 2.5 TABLET ORAL at 08:47

## 2020-01-23 RX ADMIN — MAGNESIUM GLUCONATE 500 MG ORAL TABLET 400 MG: 500 TABLET ORAL at 17:01

## 2020-01-23 RX ADMIN — CIPROFLOXACIN 200 MG: 2 INJECTION, SOLUTION INTRAVENOUS at 11:40

## 2020-01-23 RX ADMIN — OXYCODONE HYDROCHLORIDE AND ACETAMINOPHEN 1 TABLET: 5; 325 TABLET ORAL at 09:13

## 2020-01-23 RX ADMIN — OXYCODONE HYDROCHLORIDE AND ACETAMINOPHEN 1 TABLET: 5; 325 TABLET ORAL at 17:00

## 2020-01-23 RX ADMIN — INSULIN GLARGINE 25 UNITS: 100 INJECTION, SOLUTION SUBCUTANEOUS at 08:36

## 2020-01-23 RX ADMIN — INSULIN LISPRO 15 UNITS: 100 INJECTION, SOLUTION INTRAVENOUS; SUBCUTANEOUS at 12:24

## 2020-01-23 RX ADMIN — PANTOPRAZOLE SODIUM 40 MG: 40 TABLET, DELAYED RELEASE ORAL at 08:47

## 2020-01-23 RX ADMIN — GABAPENTIN 600 MG: 300 CAPSULE ORAL at 08:35

## 2020-01-23 RX ADMIN — INSULIN GLARGINE 10 UNITS: 100 INJECTION, SOLUTION SUBCUTANEOUS at 00:28

## 2020-01-23 RX ADMIN — ENOXAPARIN SODIUM 40 MG: 40 INJECTION SUBCUTANEOUS at 08:35

## 2020-01-23 RX ADMIN — LISINOPRIL 10 MG: 10 TABLET ORAL at 08:35

## 2020-01-23 RX ADMIN — CIPROFLOXACIN 200 MG: 2 INJECTION, SOLUTION INTRAVENOUS at 00:29

## 2020-01-23 RX ADMIN — INFLUENZA A VIRUS A/BRISBANE/02/2018 IVR-190 (H1N1) ANTIGEN (PROPIOLACTONE INACTIVATED), INFLUENZA A VIRUS A/KANSAS/14/2017 X-327 (H3N2) ANTIGEN (PROPIOLACTONE INACTIVATED), INFLUENZA B VIRUS B/MARYLAND/15/2016 ANTIGEN (PROPIOLACTONE INACTIVATED), INFLUENZA B VIRUS B/PHUKET/3073/2013 BVR-1B ANTIGEN (PROPIOLACTONE INACTIVATED) 0.5 ML: 15; 15; 15; 15 INJECTION, SUSPENSION INTRAMUSCULAR at 08:35

## 2020-01-23 RX ADMIN — SODIUM CHLORIDE: 9 INJECTION, SOLUTION INTRAVENOUS at 16:01

## 2020-01-23 ASSESSMENT — PAIN SCALES - GENERAL
PAINLEVEL_OUTOF10: 6
PAINLEVEL_OUTOF10: 7

## 2020-01-23 NOTE — PROGRESS NOTES
Subcutaneous TID WC    insulin lispro  0-9 Units Subcutaneous Nightly     PRN Meds: clonazePAM, oxyCODONE-acetaminophen, sodium chloride flush, magnesium hydroxide, ondansetron, acetaminophen, glucose, dextrose, glucagon (rDNA), dextrose      Intake/Output Summary (Last 24 hours) at 1/23/2020 1219  Last data filed at 1/23/2020 0930  Gross per 24 hour   Intake 120 ml   Output --   Net 120 ml       Physical Exam Performed:    /71   Pulse 77   Temp 97.7 °F (36.5 °C) (Oral)   Resp 16   Ht 5' 3\" (1.6 m)   Wt 173 lb (78.5 kg)   SpO2 99%   BMI 30.65 kg/m²     Physical Exam  Vitals signs reviewed. Constitutional:       General: She is not in acute distress. Appearance: Normal appearance. She is obese. She is not ill-appearing. HENT:      Head: Normocephalic and atraumatic. Eyes:      Conjunctiva/sclera: Conjunctivae normal.   Cardiovascular:      Rate and Rhythm: Normal rate and regular rhythm. Heart sounds: No murmur. No friction rub. No gallop. Pulmonary:      Effort: Pulmonary effort is normal.      Breath sounds: No wheezing, rhonchi or rales. Abdominal:      General: Abdomen is flat. Bowel sounds are normal.      Palpations: Abdomen is soft. Tenderness: There is no guarding or rebound. Musculoskeletal:      Right lower leg: No edema. Left lower leg: No edema. Skin:     General: Skin is warm and dry. Neurological:      General: No focal deficit present. Mental Status: She is alert and oriented to person, place, and time.    Psychiatric:         Mood and Affect: Mood normal.         Behavior: Behavior normal.           Labs:   Recent Labs     01/22/20 2000 01/23/20  0635   WBC 6.0 5.1   HGB 11.3* 10.1*   HCT 35.6* 31.0*    131*     Recent Labs     01/22/20 2000 01/22/20  2130 01/23/20  0635   * 126* 140   K 4.2 4.2 3.5   CL 89* 95* 108   CO2 25 23 23   BUN 15 15 14   CREATININE 1.3* 1.2 1.1   CALCIUM 8.9 8.3 8.4     Recent Labs     01/22/20 2000 01/23/20  0635   AST 30 19   ALT 37 28   BILITOT <0.2 <0.2   ALKPHOS 158* 126     No results for input(s): INR in the last 72 hours. No results for input(s): Rayne Gong in the last 72 hours. Urinalysis:      Lab Results   Component Value Date    NITRU POSITIVE 01/22/2020    WBCUA 6-10 01/22/2020    BACTERIA 1+ 01/22/2020    RBCUA None seen 01/22/2020    BLOODU Negative 01/22/2020    SPECGRAV <=1.005 01/22/2020    GLUCOSEU >=1000 01/22/2020    GLUCOSEU >=1000 mg/dL 06/07/2010       Radiology:  No orders to display           Assessment/Plan:    Active Hospital Problems    Diagnosis Date Noted    Acute cystitis without hematuria [N30.00] 01/22/2020    HONK [E11.01] 07/05/2017     1. Acute cystitis without hematuria  - On IV Cipro, 200mg every 12 hours. - UC pending.  - Patient states clinical improvement. - LA improved to 1.2.    2. HONK  - Latest glucose is 108, well improved from >700. - On Lantus and SSI on admission.  - Patient states clinical improvement. - Patient should see her endocrinologist ASAP after discharge to discuss her home insulin regimen to try and prevent such episodes in the future.     DVT Prophylaxis: Lovenox  Diet: DIET CARB CONTROL;  Code Status: Full Code  PT/OT Eval Status: n/a    Dispo - Pending improvement    This patient was seen and evaluated with resident team and attending, Dr. Patrick Kramer, DO  Family Medicine Resident PGY1

## 2020-01-23 NOTE — PROGRESS NOTES
4 Eyes Skin Assessment     The patient is being assess for   Admission    I agree that 2 RN's have performed a thorough Head to Toe Skin Assessment on the patient. ALL assessment sites listed below have been assessed. Areas assessed by both nurses:   [x]   Head, Face, and Ears   [x]   Shoulders, Back, and Chest, Abdomen  [x]   Arms, Elbows, and Hands   [x]   Coccyx, Sacrum, and Ischium  [x]   Legs, Feet, and Heels          **SHARE this note so that the co-signing nurse is able to place an eSignature**    Co-signer eSignature: {Esignature:[id number]}    Does the Patient have Skin Breakdown?   No          Maco Prevention initiated:  No   Wound Care Orders initiated:  No      WOC nurse consulted for Pressure Injury (Stage 3,4, Unstageable, DTI, NWPT, Complex wounds)and New or Established Ostomies:  No      Primary Nurse eSignature: Electronically signed by Henrik Abad RN on 1/23/20 at 1:05 AM

## 2020-01-23 NOTE — H&P
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  Scoliosis     Spondylosis of lumbar region without myelopathy or radiculopathy 3/10/2017    Transient cerebral ischemia 07/15/2016    TIA:7/10/16    Unspecified cerebral artery occlusion with cerebral infarction     TIA       Past Surgical History:          Procedure Laterality Date    BREAST LUMPECTOMY  2015    Bilateral:breast cancer    HYSTERECTOMY      Benign:no cervical cancer per pt'    KIDNEY REMOVAL      right    OTHER SURGICAL HISTORY Right     orif right ankle    TUBAL LIGATION         Medications Prior to Admission:      Prior to Admission medications    Medication Sig Start Date End Date Taking?  Authorizing Provider   insulin glargine (LANTUS SOLOSTAR) 100 UNIT/ML injection pen Inject 25 Units into the skin every morning 1/7/20   Edwige Armijo MD   calcium carbonate-vitamin D (CALTRATE) 600-400 MG-UNIT TABS per tab  12/30/19   Historical Provider, MD   glimepiride (AMARYL) 4 MG tablet Take 1 tablet by mouth every morning (before breakfast) 1/3/20   Edwige Armijo MD   Insulin Pen Needle 32G X 4 MM MISC 1 each by Does not apply route 3 times daily 12/27/19   Edwige Armijo MD   atorvastatin (LIPITOR) 20 MG tablet Take 1 tablet by mouth daily 12/20/19   Edwige Armijo MD   VICTOZA 18 MG/3ML SOPN SC injection INJECT 1.8 MG UNDER THE SKIN ONCE DAILY 12/4/19   Edwige Armijo MD   Insulin Syringe-Needle U-100 (BD INSULIN SYRINGE U/F) 31G X 5/16\" 0.3 ML MISC USE 3 TIMES A DAY BEFORE MEALS 6/26/19   Kassi Newberry MD   TRUE METRIX BLOOD GLUCOSE TEST strip USE AS DIRECTED TO TEST 4 TIMES A DAY 6/4/19   Kassi Newberry MD   CALCIUM 600-D 600-400 MG-UNIT TABS TAKE 1 TABLET BY MOUTH TWICE A DAY WITH FOOD 2/10/19   Historical Provider, MD   letrozole ([address] [facility]) 2.5 MG tablet TAKE 1 TABLET BY MOUTH EVERY DAY 2/1/19   Historical Provider, MD   glucagon 1 MG injection Inject 1 mg into the muscle See Admin Instructions Follow package directions for low blood sugar. 2/19/19   Kassi Newberry MD   Insulin Pen Needle (UNIFINE PENTIPS) 32G X 4 MM MISC 6 times daily for victoza and insulins. 11/28/18   Kassi Newberry MD   clonazePAM (KLONOPIN) 1 MG tablet Take 1 mg by mouth as needed. Dori Castillo Historical Provider, MD   lisinopril (PRINIVIL;ZESTRIL) 10 MG tablet TAKE 1 TABLET BY MOUTH DAILY 6/15/18   Solomon Gutiérrez MD   metFORMIN (GLUCOPHAGE) 1000 MG tablet TAKE 1 TABLET WITH MEALS TWICE A DAY 3/12/18   Jacquelyn Holly MD   B-D INS SYR ULTRAFINE 1CC/31G 31G X 5/16\" 1 ML MISC USE THREE TIMES DAILY 1/16/18   Historical Provider, MD   paliperidone (INVEGA) 9 MG extended release tablet Take 9 mg by mouth every morning  1/22/18   Historical Provider, MD   simvastatin (ZOCOR) 20 MG tablet Take 20 mg by mouth nightly  2/5/18   Historical Provider, MD   aspirin 81 MG tablet Take 81 mg by mouth daily    Historical Provider, MD   gabapentin (NEURONTIN) 600 MG tablet Take 600 mg by mouth 3 times daily    Historical Provider, MD   oxyCODONE-acetaminophen (PERCOCET) 5-325 MG per tablet Take 1 tablet by mouth every 6 hours as needed for Pain. Dori Castillo Historical Provider, MD   traZODone (DESYREL) 100 MG tablet Take 100 mg by mouth nightly    Historical Provider, MD   omeprazole (PRILOSEC) 20 MG capsule Take 1 capsule by mouth daily 6/4/15   Corin Hutchinson MD       Allergies:  Morphine; Penicillins;  Codeine; and Penicillin g    Social History:      TOBACCO:   reports that she quit smoking about 5 years ago. Her smoking use included cigarettes and cigars. She has a 10.00 pack-year smoking history. She has never used smokeless tobacco.  ETOH:   reports no history of alcohol use. Family History:      Reviewed in detail and negative for DM, CAD, Cancer, CVA. Positive as follows:        Problem Relation Age of Onset    Cancer Mother         breast    Cancer Father     Heart Failure Neg Hx     High Cholesterol Neg Hx     Hypertension Neg Hx     Migraines Neg Hx     Rashes/Skin Problems Neg Hx     Seizures Neg Hx     Stroke Neg Hx     Thyroid Disease Neg Hx     Diabetes Neg Hx        REVIEW OF SYSTEMS:   Pertinent positives as noted in the HPI. All other systems reviewed and negative. PHYSICAL EXAM PERFORMED:    BP (!) 161/70   Pulse 78   Temp 98.6 °F (37 °C) (Oral)   Resp 16   Ht 5' 3\" (1.6 m)   Wt 173 lb (78.5 kg)   SpO2 97%   BMI 30.65 kg/m²     General appearance:  No apparent distress, appears stated age and cooperative. HEENT:  Normal cephalic, atraumatic without obvious deformity. Pupils equal, round, and reactive to light. Neck: Supple, with full range of motion. No jugular venous distention. Trachea midline. Respiratory:  Normal respiratory effort. Clear to auscultation, bilaterally without Rales/Wheezes/Rhonchi. Cardiovascular:  Regular rate and rhythm  Abdomen: Soft, non-tender, non-distended with normal bowel sounds. Musculoskeletal:  No clubbing, cyanosis or edema bilaterally. Full range of motion without deformity. Skin: Skin color, texture, turgor normal.  No rashes or lesions. Neurologic:  Neurovascularly intact without any focal sensory/motor deficits.  Cranial nerves: II-XII intact, grossly non-focal.  Psychiatric:  Alert and oriented, thought content appropriate, normal insight  Capillary Refill: Brisk,< 3 seconds   Peripheral Pulses: +2 palpable, equal bilaterally       Labs:     Recent Labs     01/22/20 2000   WBC 6.0   HGB 11.3*   HCT 35.6*

## 2020-01-23 NOTE — ED NOTES
Consult to Dr. Arline Atkins glucose per Dr. Jono Lan spoke with Dr. Jono Dodson at 0831     Romi Elias  01/22/20 [id number]

## 2020-01-23 NOTE — TELEPHONE ENCOUNTER
JORDI  PT was admitted into [facility] with High BS of 750. I have canceled her follow up appt for tomorrow.

## 2020-01-23 NOTE — PLAN OF CARE
Pt A&Ox4 lying in bed. Pt orientated to room and call light. Bed is in lowest position with wheels locked. 3/4 siderails raised. Non-slip socks are on. Room is well lit. Call light within reach, will continue to monitor.

## 2020-01-23 NOTE — ED PROVIDER NOTES
2015    bilateral breast:s/p lumpectomy/radiation:under care care of breast specialist:Dr. Boone     Carotid stenosis, bilateral:<50%:per US 7/2016 7/15/2016    Carpal tunnel syndrome 10/18/2008    Cervical cancer screening 2014    Nml per pt'.  DDD (degenerative disc disease), lumbar 7/18/2018    Depression     under care of pschiatrist:Dr. 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     Scoliosis     Spondylosis of lumbar region without myelopathy or radiculopathy 3/10/2017    Transient cerebral ischemia 07/15/2016    TIA:7/10/16    Unspecified cerebral artery occlusion with cerebral infarction     TIA     Past Surgical History:   Procedure Laterality Date    BREAST LUMPECTOMY  2015    Bilateral:breast cancer    HYSTERECTOMY      Benign:no cervical cancer per pt'    KIDNEY REMOVAL      right    OTHER SURGICAL HISTORY Right     orif right ankle    TUBAL LIGATION       Family History   Problem Relation Age of Onset    Cancer Mother         breast    Cancer Father     Heart Failure Neg Hx     High Cholesterol Neg Hx     Hypertension Neg Hx     Migraines Neg Hx     Rashes/Skin Problems Neg Hx     Seizures Neg Hx     Stroke Neg Hx     Thyroid Disease Neg Hx     Diabetes Neg Hx      Social History     Socioeconomic History    Marital status:      Spouse name: Not on file    Number of children: Not on file    Years of education: Not 2.5 mg Oral Daily Francis Nora, DO        lisinopril (PRINIVIL;ZESTRIL) tablet 10 mg  10 mg Oral Daily Francis Nora, DO        pantoprazole (PROTONIX) tablet 40 mg  40 mg Oral QAM AC Francis Nora, DO        oxyCODONE-acetaminophen (PERCOCET) 5-325 MG per tablet 1 tablet  1 tablet Oral Q6H PRN Regine Pheasant, DO   1 tablet at 01/22/20 2334    paliperidone (INVEGA) extended release tablet 9 mg  9 mg Oral QAM Francis Nora, DO        traZODone (DESYREL) tablet 100 mg  100 mg Oral Nightly Francis Nora, DO   100 mg at 01/22/20 2333    sodium chloride flush 0.9 % injection 10 mL  10 mL Intravenous 2 times per day Francis Nora, DO        sodium chloride flush 0.9 % injection 10 mL  10 mL Intravenous PRN Francis Nora, DO        magnesium hydroxide (MILK OF MAGNESIA) 400 MG/5ML suspension 30 mL  30 mL Oral Daily PRN Francis Nora, DO        ondansetron (ZOFRAN) injection 4 mg  4 mg Intravenous Q6H PRN Francis Nora, DO        enoxaparin (LOVENOX) injection 40 mg  40 mg Subcutaneous Daily Francis Nora, DO        acetaminophen (TYLENOL) tablet 650 mg  650 mg Oral Q4H PRN Francis Nora, DO        0.9 % sodium chloride infusion   Intravenous Continuous Francis Nora,  mL/hr at 01/22/20 2333      ciprofloxacin (CIPRO) IVPB 200 mg  200 mg Intravenous Q12H Francis Nora,  mL/hr at 01/23/20 0029 200 mg at 01/23/20 0029    0.9 % sodium chloride bolus  1,000 mL Intravenous Once Francis Nora,  mL/hr at 01/22/20 2334 1,000 mL at 01/22/20 2334    influenza quadrivalent split vaccine (FLUZONE;FLUARIX;FLULAVAL;AFLURIA) injection 0.5 mL  0.5 mL Intramuscular Once Norvel Maha, DO        insulin lispro (HUMALOG) injection vial 0-18 Units  0-18 Units Subcutaneous TID WC Francis Nora, DO        insulin lispro (HUMALOG) injection vial 0-9 Units  0-9 Units Subcutaneous Nightly Francis Nora, DO   7 Units at 01/22/20 6820    glucose (GLUTOSE) 40 % oral gel 15 g  15 g Oral PRN Francis Nora, DO        dextrose 50 % IV Neutrophils % 65.1 %    Lymphocytes % 28.9 %    Monocytes % 5.2 %    Eosinophils % 0.6 %    Basophils % 0.2 %    Neutrophils Absolute 3.9 1.7 - 7.7 K/uL    Lymphocytes Absolute 1.7 1.0 - 5.1 K/uL    Monocytes Absolute 0.3 0.0 - 1.3 K/uL    Eosinophils Absolute 0.0 0.0 - 0.6 K/uL    Basophils Absolute 0.0 0.0 - 0.2 K/uL   Comprehensive Metabolic Panel   Result Value Ref Range    Sodium 127 (L) 136 - 145 mmol/L    Potassium 4.2 3.5 - 5.1 mmol/L    Chloride 89 (L) 99 - 110 mmol/L    CO2 25 21 - 32 mmol/L    Anion Gap 13 3 - 16    Glucose 731 (HH) 70 - 99 mg/dL    BUN 15 7 - 20 mg/dL    CREATININE 1.3 (H) 0.6 - 1.2 mg/dL    GFR Non-African American 42 (A) >60    GFR  50 (A) >60    Calcium 8.9 8.3 - 10.6 mg/dL    Total Protein 7.1 6.4 - 8.2 g/dL    Alb 3.9 3.4 - 5.0 g/dL    Albumin/Globulin Ratio 1.2 1.1 - 2.2    Total Bilirubin <0.2 0.0 - 1.0 mg/dL    Alkaline Phosphatase 158 (H) 40 - 129 U/L    ALT 37 10 - 40 U/L    AST 30 15 - 37 U/L    Globulin 3.2 g/dL   Urinalysis   Result Value Ref Range    Color, UA Yellow Straw/Yellow    Clarity, UA Clear Clear    Glucose, Ur >=1000 (A) Negative mg/dL    Bilirubin Urine Negative Negative    Ketones, Urine Negative Negative mg/dL    Specific Gravity, UA <=1.005 1.005 - 1.030    Blood, Urine Negative Negative    pH, UA 5.0 5.0 - 8.0    Protein, UA Negative Negative mg/dL    Urobilinogen, Urine 0.2 <2.0 E.U./dL    Nitrite, Urine POSITIVE (A) Negative    Leukocyte Esterase, Urine Negative Negative    Microscopic Examination YES     Urine Type NotGiven    Lactate, Sepsis   Result Value Ref Range    Lactic Acid, Sepsis 2.8 (H) 0.4 - 1.9 mmol/L   Blood Gas, Venous   Result Value Ref Range    pH, Kyle 7.279 (L) 7.350 - 7.450    pCO2, Kyle 54.2 (H) 40.0 - 50.0 mmHg    pO2, Kyle 47.1 (H) 25.0 - 40.0 mmHg    HCO3, Venous 24.8 23.0 - 29.0 mmol/L    Base Excess, Kyle -2.5 -3.0 - 3.0 mmol/L    O2 Sat, Kyle 81 Not Established %    Carboxyhemoglobin 1.8 (H) 0.0 - 1.5 %    MetHgb, Kyle 0. 7 <1.5 %    TC02 (Calc), Kyle 27 Not Established mmol/L    O2 Content, Kyle 14 Not Established VOL %    O2 Therapy Unknown    Microscopic Urinalysis   Result Value Ref Range    WBC, UA 6-10 (A) 0 - 5 /HPF    RBC, UA None seen 0 - 2 /HPF    Bacteria, UA 1+ (A) /HPF   Basic metabolic panel   Result Value Ref Range    Sodium 126 (L) 136 - 145 mmol/L    Potassium 4.2 3.5 - 5.1 mmol/L    Chloride 95 (L) 99 - 110 mmol/L    CO2 23 21 - 32 mmol/L    Anion Gap 8 3 - 16    Glucose 607 (HH) 70 - 99 mg/dL    BUN 15 7 - 20 mg/dL    CREATININE 1.2 0.6 - 1.2 mg/dL    GFR Non- 46 (A) >60    GFR  55 (A) >60    Calcium 8.3 8.3 - 10.6 mg/dL   Lactate, Sepsis   Result Value Ref Range    Lactic Acid, Sepsis 2.3 (H) 0.4 - 1.9 mmol/L   POCT Glucose   Result Value Ref Range    POC Glucose >600 (AA) 70 - 99 mg/dl    Performed on ACCU-CHEK    POCT Glucose   Result Value Ref Range    Glucose 529 mg/dL   POCT Glucose   Result Value Ref Range    POC Glucose 529 (H) 70 - 99 mg/dl    Performed on ACCU-CHEK    POCT Glucose   Result Value Ref Range    POC Glucose 382 (H) 70 - 99 mg/dl    Performed on ACCU-CHEK    EKG 12 Lead   Result Value Ref Range    Ventricular Rate 68 BPM    Atrial Rate 68 BPM    P-R Interval 160 ms    QRS Duration 74 ms    Q-T Interval 400 ms    QTc Calculation (Bazett) 425 ms    P Axis 37 degrees    R Axis -11 degrees    T Axis 41 degrees    Diagnosis       Sinus rhythm with Premature atrial complexesOtherwise normal ECGWhen compared with ECG of 17-SEP-2017 07:35,Premature atrial complexes are now Present      Radiographs (if obtained):  Radiologist's Report Reviewed:  No results found. EKG (if obtained): (All EKG's are interpreted by myself in the absence of a cardiologist)      MDM:  Patient presents the ER for evaluation of significant severe hyperglycemia, with glucose well over 700, her pH shows no severe acidemia no gap acidosis, she has hyperglycemia nonketotic emergency.   She

## 2020-01-23 NOTE — ED NOTES
Bed: 04  Expected date:   Expected time:   Means of arrival:   Comments:     Alana Kent RN  01/22/20 1949

## 2020-01-23 NOTE — DISCHARGE SUMMARY
Head: Normocephalic and atraumatic. Eyes:      Conjunctiva/sclera: Conjunctivae normal.   Cardiovascular:      Rate and Rhythm: Normal rate and regular rhythm. Heart sounds: No murmur. No friction rub. No gallop. Pulmonary:      Effort: Pulmonary effort is normal.      Breath sounds: No wheezing, rhonchi or rales. Abdominal:      General: Abdomen is flat. Bowel sounds are normal.      Palpations: Abdomen is soft. Tenderness: There is no guarding or rebound. Musculoskeletal:      Right lower leg: No edema. Left lower leg: No edema. Skin:     General: Skin is warm and dry. Neurological:      General: No focal deficit present. Mental Status: She is alert and oriented to person, place, and time. Psychiatric:         Mood and Affect: Mood normal.         Behavior: Behavior normal.           Labs: For convenience and continuity at follow-up the following most recent labs are provided:      CBC:    Lab Results   Component Value Date    WBC 5.1 01/23/2020    HGB 10.1 01/23/2020    HCT 31.0 01/23/2020     01/23/2020       Renal:    Lab Results   Component Value Date     01/23/2020    K 3.5 01/23/2020     01/23/2020    CO2 23 01/23/2020    BUN 14 01/23/2020    CREATININE 1.1 01/23/2020    CALCIUM 8.4 01/23/2020    PHOS 3.8 07/08/2017         Significant Diagnostic Studies    Radiology:   No orders to display          Consults:     IP CONSULT TO HOSPITALIST    Disposition:  Home     Condition at Discharge: Stable    Discharge Instructions/Follow-up:  Complete course of antibiotics even if you feel better. Follow-up with PCP in 1 week and Endocrinologist ASAP.     Code Status:  Full Code     Activity: activity as tolerated    Diet: diabetic diet      Discharge Medications:     Current Discharge Medication List           Details   insulin lispro, 1 Unit Dial, (HUMALOG KWIKPEN) 100 UNIT/ML SOPN Inject 0-18 Units into the skin 3 times daily (before meals) Amount to be daily for victoza and insulins. Qty: 200 each, Refills: 11    Associated Diagnoses: Uncontrolled type 2 diabetes mellitus with microalbuminuria, with long-term current use of insulin (HCC)      B-D INS SYR ULTRAFINE 1CC/31G 31G X 5/16\" 1 ML MISC USE THREE TIMES DAILY  Refills: 3       !! - Potential duplicate medications found. Please discuss with provider. Time Spent on discharge is more than 45 minutes in the examination, evaluation, counseling and review of medications and discharge plan. This patient was seen and evaluated with resident team and the attending, Dr. Sheng Mcpherson. Signed:    Jesus Hua DO   1/23/2020  Family Medicine Resident PGY1    Thank you [facility] for the opportunity to be involved in this patient's care. If you have any questions or concerns please feel free to contact me at [phone].

## 2020-01-24 LAB
ORGANISM: ABNORMAL
URINE CULTURE, ROUTINE: ABNORMAL

## 2020-01-27 RX ORDER — CALCIUM CITRATE/VITAMIN D3 200MG-6.25
TABLET ORAL
Qty: 100 STRIP | Refills: 5 | Status: SHIPPED | OUTPATIENT
Start: 2020-01-27 | End: 2020-07-16 | Stop reason: SDUPTHER

## 2020-01-31 RX ORDER — BLOOD SUGAR DIAGNOSTIC
STRIP MISCELLANEOUS
Qty: 90 EACH | Refills: 7 | Status: ON HOLD | OUTPATIENT
Start: 2020-01-31 | End: 2021-06-04 | Stop reason: HOSPADM

## 2020-02-04 NOTE — PROGRESS NOTES
Patient ID:   Chilo Young is a 60 y.o. female  Chief Complaint:   Chilo Young presents for an evaluation of Type 2 Diabetes Mellitus , Hyperlipidemia and hypertension. Subjective:   Type 2 Diabetes Mellitus diagnosed around 2010  On insulin since 2012  Previous regimen:Taking Admelog 15 units tidac and 5 units for snacks. Basaglar 40 units once a day at night. VGO stopped due to hypoglycemias     Lantus 25 units daily in am.   Humalog high dose sliding scale. Missed Tresiba twice in last two weeks. Amaryl 4 mg with first meal of the day   Jardiance 25 mg daily  Metformin 1000mg bid   Victoza 1.8mg in am     Admits making poor dietary choices, not checking blood sugars , poor adherence overall   Multiple cancellations/no shows      Checks blood sugars 0-1 times per day. Reviewed from meter   . Am:     Lunch:   Supper:    HS:        Hypoglycemias: Once in last 4 weeks      Meals: 3, dinner is big. Snacks (jellos, fruits, pretzels) after every meal because she is hungry. Exercise: None    Denies chest pain, exertional dyspnea. Family history of CAD: Both parents  Denies smoking/ alcohol.    Currently on  mg daily     The following portions of the patient's history were reviewed and updated as appropriate:       Family History   Problem Relation Age of Onset    Cancer Mother         breast    Cancer Father     Heart Failure Neg Hx     High Cholesterol Neg Hx     Hypertension Neg Hx     Migraines Neg Hx     Rashes/Skin Problems Neg Hx     Seizures Neg Hx     Stroke Neg Hx     Thyroid Disease Neg Hx     Diabetes Neg Hx          Social History     Socioeconomic History    Marital status:      Spouse name: Not on file    Number of children: Not on file    Years of education: Not on file    Highest education level: Not on file   Occupational History    Occupation:    Social Needs    Financial resource strain: Not on file    Food insecurity:     Worry: Not on file     Inability: Not on file    Transportation needs:     Medical: Not on file     Non-medical: Not on file   Tobacco Use    Smoking status: Former Smoker     Packs/day: 0.50     Years: 20.00     Pack years: 10.00     Types: Cigarettes, Cigars     Last attempt to quit: 7/3/2014     Years since quittin.6    Smokeless tobacco: Never Used   Substance and Sexual Activity    Alcohol use: No     Alcohol/week: 0.0 standard drinks    Drug use: No    Sexual activity: Never   Lifestyle    Physical activity:     Days per week: Not on file     Minutes per session: Not on file    Stress: Not on file   Relationships    Social connections:     Talks on phone: Not on file     Gets together: Not on file     Attends Evangelical service: Not on file     Active member of club or organization: Not on file     Attends meetings of clubs or organizations: Not on file     Relationship status: Not on file    Intimate partner violence:     Fear of current or ex partner: Not on file     Emotionally abused: Not on file     Physically abused: Not on file     Forced sexual activity: Not on file   Other Topics Concern    Not on file   Social History Narrative    Not on file       Past Medical History:   Diagnosis Date    Abnormal brain MRI 2017    Partially empty sella and minimal chronic small vessel ischemic disease    Acute bilateral low back pain without sciatica 2016    SHARRON (acute kidney injury) ([facility] 2017    Arthritis     back    Bipolar disorder ([facility] 10/18/2008    Cancer ([facility] 2015    bilateral breast:s/p lumpectomy/radiation:under care care of breast specialist:Dr. Boone     Carotid stenosis, bilateral:<50%:per US 2016 7/15/2016    Carpal tunnel syndrome 10/18/2008    Cervical cancer screening 2014    Nml per pt'.     DDD (degenerative disc disease), lumbar 2018    Depression     under care of pschiatrist:Dr. 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     Scoliosis     Spondylosis of lumbar region without myelopathy or radiculopathy 3/10/2017    Transient cerebral ischemia 07/15/2016    TIA:7/10/16    Unspecified cerebral artery occlusion with cerebral infarction     TIA       Past Surgical History:   Procedure Laterality Date    BREAST LUMPECTOMY  2015    Bilateral:breast cancer    HYSTERECTOMY      Benign:no cervical cancer per pt'    KIDNEY REMOVAL      right    OTHER SURGICAL HISTORY Right     orif right ankle    TUBAL LIGATION           Allergies   Allergen Reactions    Morphine Anaphylaxis and Hives     feels like throat is closing    Penicillins Hives and Swelling    Codeine Hives and Rash    Penicillin G Rash         Current Outpatient Medications:     JARDIANCE 25 MG tablet, , Disp: , Rfl:     Insulin Syringe-Needle U-100 31G X 5/16\" 0.3 ML MISC, USE 3 TIMES A DAY BEFORE MEALS, Disp: 90 each, Rfl: 7    TRUE METRIX BLOOD GLUCOSE TEST strip, USE AS DIRECTED TO TEST 4 TIMES A DAY, Disp: 100 strip, Rfl: 5    insulin lispro, 1 Unit Dial, (HUMALOG KWIKPEN) 100 UNIT/ML SOPN, Inject 0-18 Units into the skin 3 times daily (before meals) Amount to be administered as follows: Glucose: Dose:              No Insulin 140-199           3 Units 200-249 6 Units 250-299 9 Units 300-349 12 Units 350-400 15 Units Over 400  18 Units, Disp: 5 pen, Rfl: 0    insulin glargine (LANTUS SOLOSTAR) 100 UNIT/ML injection pen, Inject 25 Units into the skin every morning (Patient taking differently: Inject 20 Units into the skin every morning ), Disp: 5 pen, Rfl: 3    calcium carbonate-vitamin D (CALTRATE) 600-400 MG-UNIT TABS per tab, , Disp: , Rfl:     glimepiride (AMARYL) 4 MG tablet, Take 1 tablet by mouth every morning (before breakfast), Disp: 30 tablet, Rfl: 2    Insulin Pen Needle 32G X 4 MM MISC, 1 each by Does not apply route 3 times daily, Disp: 100 each, Rfl: 5    atorvastatin (LIPITOR) 20 MG tablet, Take 1 tablet by mouth daily, Disp: 30 tablet, Rfl: 11    VICTOZA 18 MG/3ML SOPN SC injection, INJECT 1.8 MG UNDER THE SKIN ONCE DAILY, Disp: 3 pen, Rfl: 0    CALCIUM 600-D 600-400 MG-UNIT TABS, daily , Disp: , Rfl: 11    letrozole (FEMARA) 2.5 MG tablet, TAKE 1 TABLET BY MOUTH EVERY DAY, Disp: , Rfl: 3    glucagon 1 MG injection, Inject 1 mg into the muscle See Admin Instructions Follow package directions for low blood sugar., Disp: 1 kit, Rfl: 0    Insulin Pen Needle (UNIFINE PENTIPS) 32G X 4 MM MISC, 6 times daily for victoza and insulins. , Disp: 200 each, Rfl: 11    clonazePAM (KLONOPIN) 1 MG tablet, Take 1 mg by mouth as needed. ., Disp: , Rfl:     lisinopril (PRINIVIL;ZESTRIL) 10 MG tablet, TAKE 1 TABLET BY MOUTH DAILY, Disp: 30 tablet, Rfl: 0    metFORMIN (GLUCOPHAGE) 1000 MG tablet, TAKE 1 TABLET WITH MEALS TWICE A DAY, Disp: 60 tablet, Rfl: 0    paliperidone (INVEGA) 9 MG extended release tablet, Take 9 mg by mouth every morning , Disp: , Rfl:     simvastatin (ZOCOR) 20 MG tablet, Take 20 mg by mouth nightly , Disp: , Rfl:     aspirin 81 MG tablet, Take 81 mg by mouth daily, Disp: , Rfl:     gabapentin (NEURONTIN) 600 MG tablet, Take 600 mg by mouth 3 times daily, Disp: , Rfl:     oxyCODONE-acetaminophen (PERCOCET) 5-325 MG per tablet, Take 1 tablet by mouth every 6 hours as needed for Pain.  ., Disp: , Rfl:     traZODone (DESYREL) 100 MG tablet, Take 100 mg by mouth nightly, Disp: , Rfl:     omeprazole (PRILOSEC) 20 MG capsule, Take 1 capsule by mouth daily, Disp: 30 capsule, Rfl: 0      Review of Systems:    Constitutional: Negative for fever, chills, and unexpected weight change. HENT: Negative for congestion, ear pain, rhinorrhea,  sore throat and trouble swallowing. Eyes: Negative for photophobia, redness, itching. Respiratory: Negative for cough, shortness of breath and sputum. Cardiovascular: Negative for chest pain, palpitations and leg swelling. Gastrointestinal: Negative for nausea, vomiting, abdominal pain, diarrhea, constipation. Endocrine: Negative for cold intolerance, heat intolerance, polydipsia, polyphagia and polyuria. Genitourinary: Negative for dysuria, urgency, frequency, hematuria and flank pain. Musculoskeletal: Negative for myalgias, back pain, arthralgias and neck pain. Skin/Nail: Negative for rash, itching. Normal nails. Neurological: Negative for seizures, weakness, light-headedness, numbness and headaches. Hematological/ Lymph nodes: Negative for adenopathy. Does not bruise/bleed easily. Psychiatric/Behavioral: Negative for suicidal ideas, depression, anxiety, sleep disturbance and decreased concentration. Objective:   Physical Exam:  BP (!) 143/79 (Site: Right Upper Arm, Position: Sitting, Cuff Size: Large Adult)   Pulse 78   Ht 5' 3\" (1.6 m)   Wt 169 lb (76.7 kg)   SpO2 97%   BMI 29.94 kg/m²   Constitutional: Patient is oriented to person, place, and time. Patient appears well-developed and well-nourished. HENT:    Head: Normocephalic and atraumatic. Eyes: Conjunctivae and EOM are normal.     Neck: Normal range of motion. Thyroid exam normal.   Cardiovascular: Normal rate, regular rhythm and normal heart sounds. Pulmonary/Chest: Effort normal and breath sounds normal.   Abdominal: Soft. Bowel sounds are normal.   Musculoskeletal: Normal range of motion. Neurological: Patient is alert and oriented to person, place, and time.  Patient has normal reflexes. Skin: Skin is warm and dry. Psychiatric: Patient has a normal mood and affect.  Patient behavior is normal.     Lab Review:    Admission on 01/22/2020, Discharged on 01/23/2020   Component Date Value Ref Range Status    POC Glucose 01/22/2020 >600* 70 - 99 mg/dl Final    Performed on 01/22/2020 ACCU-CHEK   Final    WBC 01/22/2020 6.0  4.0 - 11.0 K/uL Final    RBC 01/22/2020 4.11  4.00 - 5.20 M/uL Final    Hemoglobin 01/22/2020 11.3* 12.0 - 16.0 g/dL Final    Hematocrit 01/22/2020 35.6* 36.0 - 48.0 % Final    MCV 01/22/2020 86.4  80.0 - 100.0 fL Final    MCH 01/22/2020 27.4  26.0 - 34.0 pg Final    MCHC 01/22/2020 31.7  31.0 - 36.0 g/dL Final    RDW 01/22/2020 13.2  12.4 - 15.4 % Final    Platelets 59/96/4952 156  135 - 450 K/uL Final    MPV 01/22/2020 10.6* 5.0 - 10.5 fL Final    Neutrophils % 01/22/2020 65.1  % Final    Lymphocytes % 01/22/2020 28.9  % Final    Monocytes % 01/22/2020 5.2  % Final    Eosinophils % 01/22/2020 0.6  % Final    Basophils % 01/22/2020 0.2  % Final    Neutrophils Absolute 01/22/2020 3.9  1.7 - 7.7 K/uL Final    Lymphocytes Absolute 01/22/2020 1.7  1.0 - 5.1 K/uL Final    Monocytes Absolute 01/22/2020 0.3  0.0 - 1.3 K/uL Final    Eosinophils Absolute 01/22/2020 0.0  0.0 - 0.6 K/uL Final    Basophils Absolute 01/22/2020 0.0  0.0 - 0.2 K/uL Final    Sodium 01/22/2020 127* 136 - 145 mmol/L Final    Potassium 01/22/2020 4.2  3.5 - 5.1 mmol/L Final    Chloride 01/22/2020 89* 99 - 110 mmol/L Final    CO2 01/22/2020 25  21 - 32 mmol/L Final    Anion Gap 01/22/2020 13  3 - 16 Final    Glucose 01/22/2020 731* 70 - 99 mg/dL Final    BUN 01/22/2020 15  7 - 20 mg/dL Final    CREATININE 01/22/2020 1.3* 0.6 - 1.2 mg/dL Final    GFR Non- 01/22/2020 42* >60 Final    GFR  01/22/2020 50* >60 Final    Calcium 01/22/2020 8.9  8.3 - 10.6 mg/dL Final    Total Protein 01/22/2020 7.1  6.4 - 8.2 g/dL Final    Alb 01/22/2020 3.9  3.4 - 47.1* 25.0 - 40.0 mmHg Final    HCO3, Venous 01/22/2020 24.8  23.0 - 29.0 mmol/L Final    Base Excess, Kyle 01/22/2020 -2.5  -3.0 - 3.0 mmol/L Final    O2 Sat, Kyle 01/22/2020 81  Not Established % Final    Carboxyhemoglobin 01/22/2020 1.8* 0.0 - 1.5 % Final    MetHgb, Kyle 01/22/2020 0.7  <1.5 % Final    TC02 (Calc), Kyle 01/22/2020 27  Not Established mmol/L Final    O2 Content, Kyle 01/22/2020 14  Not Established VOL % Final    O2 Therapy 01/22/2020 Unknown   Final    WBC, UA 01/22/2020 6-10* 0 - 5 /HPF Final    RBC, UA 01/22/2020 None seen  0 - 2 /HPF Final    Bacteria, UA 01/22/2020 1+* /HPF Final    POC Glucose 01/22/2020 529* 70 - 99 mg/dl Final    Performed on 01/22/2020 ACCU-CHEK   Final    Sodium 01/22/2020 126* 136 - 145 mmol/L Final    Potassium 01/22/2020 4.2  3.5 - 5.1 mmol/L Final    Chloride 01/22/2020 95* 99 - 110 mmol/L Final    CO2 01/22/2020 23  21 - 32 mmol/L Final    Anion Gap 01/22/2020 8  3 - 16 Final    Glucose 01/22/2020 607* 70 - 99 mg/dL Final    BUN 01/22/2020 15  7 - 20 mg/dL Final    CREATININE 01/22/2020 1.2  0.6 - 1.2 mg/dL Final    GFR Non- 01/22/2020 46* >60 Final    GFR  01/22/2020 55* >60 Final    Calcium 01/22/2020 8.3  8.3 - 10.6 mg/dL Final    Lactic Acid, Sepsis 01/22/2020 2.3* 0.4 - 1.9 mmol/L Final    Lactic Acid, Sepsis 01/23/2020 1.2  0.4 - 1.9 mmol/L Final    POC Glucose 01/22/2020 382* 70 - 99 mg/dl Final    Performed on 01/22/2020 ACCU-CHEK   Final    Sodium 01/23/2020 140  136 - 145 mmol/L Final    Potassium reflex Magnesium 01/23/2020 3.5  3.5 - 5.1 mmol/L Final    Chloride 01/23/2020 108  99 - 110 mmol/L Final    CO2 01/23/2020 23  21 - 32 mmol/L Final    Anion Gap 01/23/2020 9  3 - 16 Final    Glucose 01/23/2020 53* 70 - 99 mg/dL Final    BUN 01/23/2020 14  7 - 20 mg/dL Final    CREATININE 01/23/2020 1.1  0.6 - 1.2 mg/dL Final    GFR Non- 01/23/2020 51* >60 Final    GFR  American 01/23/2020 >60  >60 Final    Calcium 01/23/2020 8.4  8.3 - 10.6 mg/dL Final    Total Protein 01/23/2020 5.9* 6.4 - 8.2 g/dL Final    Alb 01/23/2020 3.1* 3.4 - 5.0 g/dL Final    Albumin/Globulin Ratio 01/23/2020 1.1  1.1 - 2.2 Final    Total Bilirubin 01/23/2020 <0.2  0.0 - 1.0 mg/dL Final    Alkaline Phosphatase 01/23/2020 126  40 - 129 U/L Final    ALT 01/23/2020 28  10 - 40 U/L Final    AST 01/23/2020 19  15 - 37 U/L Final    Globulin 01/23/2020 2.8  g/dL Final    WBC 01/23/2020 5.1  4.0 - 11.0 K/uL Final    RBC 01/23/2020 3.67* 4.00 - 5.20 M/uL Final    Hemoglobin 01/23/2020 10.1* 12.0 - 16.0 g/dL Final    Hematocrit 01/23/2020 31.0* 36.0 - 48.0 % Final    MCV 01/23/2020 84.5  80.0 - 100.0 fL Final    MCH 01/23/2020 27.6  26.0 - 34.0 pg Final    MCHC 01/23/2020 32.7  31.0 - 36.0 g/dL Final    RDW 01/23/2020 13.2  12.4 - 15.4 % Final    Platelets 86/50/5220 131* 135 - 450 K/uL Final    MPV 01/23/2020 10.5  5.0 - 10.5 fL Final    Neutrophils % 01/23/2020 39.3  % Final    Lymphocytes % 01/23/2020 50.7  % Final    Monocytes % 01/23/2020 7.7  % Final    Eosinophils % 01/23/2020 1.4  % Final    Basophils % 01/23/2020 0.9  % Final    Neutrophils Absolute 01/23/2020 2.0  1.7 - 7.7 K/uL Final    Lymphocytes Absolute 01/23/2020 2.6  1.0 - 5.1 K/uL Final    Monocytes Absolute 01/23/2020 0.4  0.0 - 1.3 K/uL Final    Eosinophils Absolute 01/23/2020 0.1  0.0 - 0.6 K/uL Final    Basophils Absolute 01/23/2020 0.0  0.0 - 0.2 K/uL Final    Organism 01/23/2020 Escherichia coli*  Final    Urine Culture, Routine 01/23/2020 >100,000 CFU/ml   Final    POC Glucose 01/23/2020 131* 70 - 99 mg/dl Final    Performed on 01/23/2020 ACCU-CHEK   Final    POC Glucose 01/23/2020 76  70 - 99 mg/dl Final    Performed on 01/23/2020 ACCU-CHEK   Final    POC Glucose 01/23/2020 108* 70 - 99 mg/dl Final    Performed on 01/23/2020 ACCU-CHEK   Final    Magnesium 01/23/2020 1.60* 1.80 - 2.40 mg/dL Final    07/22/2019 50* >60 Final    Calcium 07/22/2019 9.3  8.3 - 10.6 mg/dL Final    Total Protein 07/22/2019 7.6  6.4 - 8.2 g/dL Final    Alb 07/22/2019 4.1  3.4 - 5.0 g/dL Final    Albumin/Globulin Ratio 07/22/2019 1.2  1.1 - 2.2 Final    Total Bilirubin 07/22/2019 <0.2  0.0 - 1.0 mg/dL Final    Alkaline Phosphatase 07/22/2019 147* 40 - 129 U/L Final    ALT 07/22/2019 48* 10 - 40 U/L Final    AST 07/22/2019 32  15 - 37 U/L Final    Globulin 07/22/2019 3.5  g/dL Final    Color, UA 07/22/2019 Straw  Straw/Yellow Final    Clarity, UA 07/22/2019 Clear  Clear Final    Glucose, Ur 07/22/2019 100* Negative mg/dL Final    Bilirubin Urine 07/22/2019 Negative  Negative Final    Ketones, Urine 07/22/2019 Negative  Negative mg/dL Final    Specific Gravity, UA 07/22/2019 1.020  1.005 - 1.030 Final    Blood, Urine 07/22/2019 TRACE-INTACT* Negative Final    pH, UA 07/22/2019 5.5  5.0 - 8.0 Final    Protein, UA 07/22/2019 TRACE* Negative mg/dL Final    Urobilinogen, Urine 07/22/2019 0.2  <2.0 E.U./dL Final    Nitrite, Urine 07/22/2019 POSITIVE* Negative Final    Leukocyte Esterase, Urine 07/22/2019 SMALL* Negative Final    Microscopic Examination 07/22/2019 YES   Final    Urine Type 07/22/2019 Not Specified   Final    WBC, UA 07/22/2019 * 0 - 5 /HPF Final    RBC, UA 07/22/2019 0-2  0 - 2 /HPF Final    Bacteria, UA 07/22/2019 2+* /HPF Final    Lipase 07/22/2019 15.0  13.0 - 60.0 U/L Final   Orders Only on 06/11/2019   Component Date Value Ref Range Status    Hemoglobin A1C 06/11/2019 6.8  See comment % Final    eAG 06/11/2019 148.5  mg/dL Final           Assessment and Plan     Tino Colby was seen today for diabetes.     Diagnoses and all orders for this visit:    Uncontrolled type 2 diabetes mellitus with diabetic nephropathy, with long-term current use of insulin ([facility]    Uncontrolled type 2 diabetes mellitus with microalbuminuria, with long-term current use of insulin ([facility])    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 ([facility]    Diabetic polyneuropathy associated with type 2 diabetes mellitus ([facility]    Dyslipidemia associated with type 2 diabetes mellitus ([facility]    Essential hypertension          1: Type 2 DM complicated with nephropathy, neuropathy TIAs, carotid stenosis   Uncontrolled A1C 8.4%<  6.8% < 10.1%  << 8.1 <<  8% << 11.7%    A1C of <8 would be acceptable because of microvascular and macrovascular complications. Poor adherence to medical plan, treatment adherence and diet plan   Anni Sanders not approved     Need to improve adherence to meds/insulins     Change Lantus to 22 units in am.   C/w Amaryl to 4 mg with first meal of the day   C/w Jardiance 25 mg daily   C/w Metformin 1000mg bid   C/w Victoza 1.8mg in am     All instructions provided in written. Check Blood sugars 4 times per day. Log them along with insulin and send them every 2 weeks. Call for blood sugars less than 60 or more than 400. Eye exam: Last exam in March 12th 2018, Needs an exam now. Foot exam:  March 2020    Deformity/amputation: absent  Skin lesions/pre-ulcerative calluses: absent  Edema: right- negative, left- negative  Sensory exam: Monofilament sensation: normal  Plus at least one of the following:   Pulses: normal,   Vibration (128 Hz):  Moderately decreased     Renal screen: High 68 Feb 2018, high 47 Jan 2019    Counselled for smoking cessation     TSH screen: Feb 2018   Saw CDE in 2016 with Rd.     2: HTN   High   Med adherence addressed     3: Hyperlipidemia   LDL: 45 , HDL: 45 , TGs: 130 Jan 2019     Leg pains present   Continue atorvastatin 20mg daily     RTC in 4 weeks with logs    Labs on the visit     EDUCATION:   Greater than 50% of this follow-up visit was spent in general counseling regarding   obesity, diet, exercise, importance of adherence to insulin regime, recognition and treatment of hypo and hyperglycemia,  glucose logging, proper diabetes management, diabetic complications with poor management and the importance of glycemic control in order to avoid the complications of diabetes. Risks and potential complications of diabetes were reviewed with the patient. Diabetes health maintenance plan and follow-up were discussed and understood by the patient. We reviewed the importance of medication compliance and regular follow-up. Aggressive lifestyle modification was encouraged. Exercise Counselling: This patient is a candidate for regular physical exercise. Instructions to perform the following types of exercise:  Swimming or water aerobic exercise  Brisk walking  Playing tennis  Stationary bicycle or elliptical indoor  Low impact aerobic exercise    Instructions given to exercise for the following duration:  30 minutes a day for five-seven days per week.     Following instructions for being active throughout the day in addition to formal exercise:  Walk instead of drive whenever possible  Take the stairs instead of the elevator  Work in the garden  Park to the far end of the parking lot to add more walking steps to destination      Electronically signed by Amy Snyder MD on 2/7/2020 at 11:29 AM

## 2020-02-07 ENCOUNTER — OFFICE VISIT (OUTPATIENT)
Dept: ENDOCRINOLOGY | Age: 60
End: 2020-02-07
Payer: MEDICAID

## 2020-02-07 VITALS
SYSTOLIC BLOOD PRESSURE: 143 MMHG | HEART RATE: 78 BPM | WEIGHT: 169 LBS | OXYGEN SATURATION: 97 % | BODY MASS INDEX: 29.95 KG/M2 | HEIGHT: 63 IN | DIASTOLIC BLOOD PRESSURE: 82 MMHG

## 2020-02-07 PROCEDURE — 3017F COLORECTAL CA SCREEN DOC REV: CPT | Performed by: INTERNAL MEDICINE

## 2020-02-07 PROCEDURE — G8482 FLU IMMUNIZE ORDER/ADMIN: HCPCS | Performed by: INTERNAL MEDICINE

## 2020-02-07 PROCEDURE — G8417 CALC BMI ABV UP PARAM F/U: HCPCS | Performed by: INTERNAL MEDICINE

## 2020-02-07 PROCEDURE — G8427 DOCREV CUR MEDS BY ELIG CLIN: HCPCS | Performed by: INTERNAL MEDICINE

## 2020-02-07 PROCEDURE — 99214 OFFICE O/P EST MOD 30 MIN: CPT | Performed by: INTERNAL MEDICINE

## 2020-02-07 PROCEDURE — 1036F TOBACCO NON-USER: CPT | Performed by: INTERNAL MEDICINE

## 2020-02-07 PROCEDURE — 3046F HEMOGLOBIN A1C LEVEL >9.0%: CPT | Performed by: INTERNAL MEDICINE

## 2020-02-07 PROCEDURE — 2022F DILAT RTA XM EVC RTNOPTHY: CPT | Performed by: INTERNAL MEDICINE

## 2020-02-07 PROCEDURE — 1111F DSCHRG MED/CURRENT MED MERGE: CPT | Performed by: INTERNAL MEDICINE

## 2020-02-07 RX ORDER — EMPAGLIFLOZIN 25 MG/1
25 TABLET, FILM COATED ORAL DAILY
COMMUNITY
Start: 2020-01-22 | End: 2020-06-25

## 2020-03-01 ENCOUNTER — TELEPHONE (OUTPATIENT)
Dept: ENDOCRINOLOGY | Age: 60
End: 2020-03-01

## 2020-03-01 NOTE — TELEPHONE ENCOUNTER
Kit Cruz called in as she ran out [facility]   As per insurance letter, Lantus is preferred. Sending in Lantus 22 units daily  I inquired about doses and she is taking Humalog 20 units once a day and Tresiba ssi three times per day. I went over insulin doses, Lantus or Tresiba once a day.  Humalog SSI if needed     She verbalized understanding and wrote down the recommendations

## 2020-03-06 ENCOUNTER — OFFICE VISIT (OUTPATIENT)
Dept: ENDOCRINOLOGY | Age: 60
End: 2020-03-06
Payer: MEDICAID

## 2020-03-06 VITALS
DIASTOLIC BLOOD PRESSURE: 84 MMHG | OXYGEN SATURATION: 98 % | HEART RATE: 74 BPM | SYSTOLIC BLOOD PRESSURE: 134 MMHG | WEIGHT: 167 LBS | BODY MASS INDEX: 29.59 KG/M2 | HEIGHT: 63 IN

## 2020-03-06 PROCEDURE — G8482 FLU IMMUNIZE ORDER/ADMIN: HCPCS | Performed by: INTERNAL MEDICINE

## 2020-03-06 PROCEDURE — 1036F TOBACCO NON-USER: CPT | Performed by: INTERNAL MEDICINE

## 2020-03-06 PROCEDURE — 3046F HEMOGLOBIN A1C LEVEL >9.0%: CPT | Performed by: INTERNAL MEDICINE

## 2020-03-06 PROCEDURE — 3017F COLORECTAL CA SCREEN DOC REV: CPT | Performed by: INTERNAL MEDICINE

## 2020-03-06 PROCEDURE — 99214 OFFICE O/P EST MOD 30 MIN: CPT | Performed by: INTERNAL MEDICINE

## 2020-03-06 PROCEDURE — G8427 DOCREV CUR MEDS BY ELIG CLIN: HCPCS | Performed by: INTERNAL MEDICINE

## 2020-03-06 PROCEDURE — 2022F DILAT RTA XM EVC RTNOPTHY: CPT | Performed by: INTERNAL MEDICINE

## 2020-03-06 PROCEDURE — G8417 CALC BMI ABV UP PARAM F/U: HCPCS | Performed by: INTERNAL MEDICINE

## 2020-03-06 NOTE — PROGRESS NOTES
Patient ID:   Jhony Resendiz is a 60 y.o. female  Chief Complaint:   Jhony Resendiz presents for an evaluation of Type 2 Diabetes Mellitus , Hyperlipidemia and hypertension. Subjective:   Type 2 Diabetes Mellitus diagnosed around 2010  On insulin since 2012  Previous regimen:Taking Admelog 15 units tidac and 5 units for snacks. Basaglar 40 units once a day at night. VGO stopped due to hypoglycemias     She is not sure about insulin doses     Lantus 22 units daily in am.   Humalog high dose sliding scale. Amaryl 4 mg with first meal of the day. Last pick first week of Jan 2020   Jardiance 25 mg daily  Metformin 1000mg bid   Victoza 1.8mg in am. Last  Dec 2019     Admits making poor dietary choices, not checking blood sugars , poor adherence overall   Multiple cancellations/no shows      Checks blood sugars 0-1 times per day. Reviewed from meter . Am:     Lunch:   Supper:    HS:        Hypoglycemias: Once in last 4 weeks      Meals: 3, dinner is big. Snacks (jellos, fruits, pretzels) after every meal because she is hungry. Exercise: None    Denies chest pain, exertional dyspnea. Family history of CAD: Both parents  Denies smoking/ alcohol.    Currently on  mg daily     The following portions of the patient's history were reviewed and updated as appropriate:       Family History   Problem Relation Age of Onset    Cancer Mother         breast    Cancer Father     Heart Failure Neg Hx     High Cholesterol Neg Hx     Hypertension Neg Hx     Migraines Neg Hx     Rashes/Skin Problems Neg Hx     Seizures Neg Hx     Stroke Neg Hx     Thyroid Disease Neg Hx     Diabetes Neg Hx          Social History     Socioeconomic History    Marital status:      Spouse name: Not on file    Number of children: Not on file    Years of education: Not on file    Highest education level: Not on file   Occupational History    Occupation:    Social Needs    Financial resource Depression/anxiety 7/5/2017    Depression/anxiety     Diabetes mellitus ([facility]     Gout     History of mammogram 10/28/2016;8/14/17    Negative    Hyperlipidemia     Hypertension     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     Non morbid obesity 7/1/2016    Pancreatitis 5/12/16    [facility] hospitalization 5/12/16-5/16/16:under care of GI:chronic pancreatitis    S/P endoscopy 6/14/2016    [facility]:per pt' & her family member was nml.     Scoliosis     Spondylosis of lumbar region without myelopathy or radiculopathy 3/10/2017    Transient cerebral ischemia 07/15/2016    TIA:7/10/16    Unspecified cerebral artery occlusion with cerebral infarction     TIA       Past Surgical History:   Procedure Laterality Date    BREAST LUMPECTOMY  2015    Bilateral:breast cancer    HYSTERECTOMY      Benign:no cervical cancer per pt'    KIDNEY REMOVAL      right    OTHER SURGICAL HISTORY Right     orif right ankle    TUBAL LIGATION           Allergies   Allergen Reactions    Morphine Anaphylaxis and Hives     feels like throat is closing    Penicillins Hives and Swelling    Codeine Hives and Rash    Penicillin G Rash         Current Outpatient Medications:     insulin glargine (LANTUS SOLOSTAR) 100 UNIT/ML injection pen, Inject 22 Units into the skin every morning, Disp: 5 pen, Rfl: 3    JARDIANCE 25 MG tablet, , Disp: , Rfl:     Insulin Syringe-Needle U-100 31G X 5/16\" 0.3 ML MISC, USE 3 TIMES A DAY BEFORE MEALS, Disp: 90 each, Rfl: 7    TRUE METRIX BLOOD GLUCOSE TEST strip, USE AS DIRECTED TO TEST 4 TIMES A DAY, Disp: 100 strip, Rfl: 5    calcium carbonate-vitamin D (CALTRATE) 600-400 MG-UNIT TABS per tab, , Disp: , Rfl:     glimepiride (AMARYL) 4 MG tablet, Take 1 tablet by mouth every morning (before breakfast), Disp: 30 tablet, Rfl: 2    Insulin Pen Needle 32G X 4 MM MISC, 1 each by Does not apply route 3 times daily, Disp: 100 each, Rfl: 5    atorvastatin (LIPITOR) 20 MG tablet, Take 1 tablet by mouth daily, Disp: 30 tablet, Rfl: 11    VICTOZA 18 MG/3ML SOPN SC injection, INJECT 1.8 MG UNDER THE SKIN ONCE DAILY, Disp: 3 pen, Rfl: 0    CALCIUM 600-D 600-400 MG-UNIT TABS, daily , Disp: , Rfl: 11    letrozole (FEMARA) 2.5 MG tablet, TAKE 1 TABLET BY MOUTH EVERY DAY, Disp: , Rfl: 3    glucagon 1 MG injection, Inject 1 mg into the muscle See Admin Instructions Follow package directions for low blood sugar., Disp: 1 kit, Rfl: 0    Insulin Pen Needle (UNIFINE PENTIPS) 32G X 4 MM MISC, 6 times daily for victoza and insulins. , Disp: 200 each, Rfl: 11    clonazePAM (KLONOPIN) 1 MG tablet, Take 1 mg by mouth as needed. ., Disp: , Rfl:     lisinopril (PRINIVIL;ZESTRIL) 10 MG tablet, TAKE 1 TABLET BY MOUTH DAILY, Disp: 30 tablet, Rfl: 0    metFORMIN (GLUCOPHAGE) 1000 MG tablet, TAKE 1 TABLET WITH MEALS TWICE A DAY, Disp: 60 tablet, Rfl: 0    paliperidone (INVEGA) 9 MG extended release tablet, Take 9 mg by mouth every morning , Disp: , Rfl:     simvastatin (ZOCOR) 20 MG tablet, Take 20 mg by mouth nightly , Disp: , Rfl:     aspirin 81 MG tablet, Take 81 mg by mouth daily, Disp: , Rfl:     gabapentin (NEURONTIN) 600 MG tablet, Take 600 mg by mouth 3 times daily, Disp: , Rfl:     oxyCODONE-acetaminophen (PERCOCET) 5-325 MG per tablet, Take 1 tablet by mouth every 6 hours as needed for Pain. ., Disp: , Rfl:     traZODone (DESYREL) 100 MG tablet, Take 100 mg by mouth nightly, Disp: , Rfl:     omeprazole (PRILOSEC) 20 MG capsule, Take 1 capsule by mouth daily, Disp: 30 capsule, Rfl: 0      Review of Systems:    Constitutional: Negative for fever, chills, and unexpected weight change. HENT: Negative for congestion, ear pain, rhinorrhea,  sore throat and trouble swallowing. Eyes: Negative for photophobia, redness, itching.    Respiratory: Negative for cough, shortness of breath and sputum. Cardiovascular: Negative for chest pain, palpitations and leg swelling. Gastrointestinal: Negative for nausea, vomiting, abdominal pain, diarrhea, constipation. Endocrine: Negative for cold intolerance, heat intolerance, polydipsia, polyphagia and polyuria. Genitourinary: Negative for dysuria, urgency, frequency, hematuria and flank pain. Musculoskeletal: Negative for myalgias, back pain, arthralgias and neck pain. Skin/Nail: Negative for rash, itching. Normal nails. Neurological: Negative for seizures, weakness, light-headedness, numbness and headaches. Hematological/ Lymph nodes: Negative for adenopathy. Does not bruise/bleed easily. Psychiatric/Behavioral: Negative for suicidal ideas, depression, anxiety, sleep disturbance and decreased concentration. Objective:   Physical Exam:  /84 (Site: Right Upper Arm, Position: Sitting, Cuff Size: Medium Adult)   Pulse 74   Ht 5' 3\" (1.6 m)   Wt 167 lb (75.8 kg)   SpO2 98%   BMI 29.58 kg/m²   Constitutional: Patient is oriented to person, place, and time. Patient appears well-developed and well-nourished. HENT:    Head: Normocephalic and atraumatic. Eyes: Conjunctivae and EOM are normal.     Neck: Normal range of motion. Thyroid exam normal.   Cardiovascular: Normal rate, regular rhythm and normal heart sounds. Pulmonary/Chest: Effort normal and breath sounds normal.   Abdominal: Soft. Bowel sounds are normal.   Musculoskeletal: Normal range of motion. Neurological: Patient is alert and oriented to person, place, and time. Patient has normal reflexes. Skin: Skin is warm and dry. Psychiatric: Patient has a normal mood and affect.  Patient behavior is normal.     Lab Review:    Admission on 01/22/2020, Discharged on 01/23/2020   Component Date Value Ref Range Status    POC Glucose 01/22/2020 >600* 70 - 99 mg/dl Final    Performed on 01/22/2020 ACCU-CHEK   Final    WBC 01/22/2020 6.0  4.0 - 11.0 K/uL Final    RBC 01/22/2020 4.11  4.00 - 5.20 M/uL Final    Hemoglobin 01/22/2020 11.3* 12.0 - 16.0 g/dL Final    Hematocrit 01/22/2020 35.6* 36.0 - 48.0 % Final    MCV 01/22/2020 86.4  80.0 - 100.0 fL Final    MCH 01/22/2020 27.4  26.0 - 34.0 pg Final    MCHC 01/22/2020 31.7  31.0 - 36.0 g/dL Final    RDW 01/22/2020 13.2  12.4 - 15.4 % Final    Platelets 12/78/8408 156  135 - 450 K/uL Final    MPV 01/22/2020 10.6* 5.0 - 10.5 fL Final    Neutrophils % 01/22/2020 65.1  % Final    Lymphocytes % 01/22/2020 28.9  % Final    Monocytes % 01/22/2020 5.2  % Final    Eosinophils % 01/22/2020 0.6  % Final    Basophils % 01/22/2020 0.2  % Final    Neutrophils Absolute 01/22/2020 3.9  1.7 - 7.7 K/uL Final    Lymphocytes Absolute 01/22/2020 1.7  1.0 - 5.1 K/uL Final    Monocytes Absolute 01/22/2020 0.3  0.0 - 1.3 K/uL Final    Eosinophils Absolute 01/22/2020 0.0  0.0 - 0.6 K/uL Final    Basophils Absolute 01/22/2020 0.0  0.0 - 0.2 K/uL Final    Sodium 01/22/2020 127* 136 - 145 mmol/L Final    Potassium 01/22/2020 4.2  3.5 - 5.1 mmol/L Final    Chloride 01/22/2020 89* 99 - 110 mmol/L Final    CO2 01/22/2020 25  21 - 32 mmol/L Final    Anion Gap 01/22/2020 13  3 - 16 Final    Glucose 01/22/2020 731* 70 - 99 mg/dL Final    BUN 01/22/2020 15  7 - 20 mg/dL Final    CREATININE 01/22/2020 1.3* 0.6 - 1.2 mg/dL Final    GFR Non- 01/22/2020 42* >60 Final    GFR  01/22/2020 50* >60 Final    Calcium 01/22/2020 8.9  8.3 - 10.6 mg/dL Final    Total Protein 01/22/2020 7.1  6.4 - 8.2 g/dL Final    Alb 01/22/2020 3.9  3.4 - 5.0 g/dL Final    Albumin/Globulin Ratio 01/22/2020 1.2  1.1 - 2.2 Final    Total Bilirubin 01/22/2020 <0.2  0.0 - 1.0 mg/dL Final    Alkaline Phosphatase 01/22/2020 158* 40 - 129 U/L Final    ALT 01/22/2020 37  10 - 40 U/L Final    AST 01/22/2020 30  15 - 37 U/L Final    Globulin 01/22/2020 3.2  g/dL Final    Ventricular Rate 01/22/2020 68  BPM Admission on 07/22/2019, Discharged on 07/23/2019   Component Date Value Ref Range Status    Sed Rate 07/22/2019 42* 0 - 30 mm/Hr Final    WBC 07/22/2019 8.1  4.0 - 11.0 K/uL Final    RBC 07/22/2019 3.73* 4.00 - 5.20 M/uL Final    Hemoglobin 07/22/2019 10.3* 12.0 - 16.0 g/dL Final    Hematocrit 07/22/2019 32.9* 36.0 - 48.0 % Final    MCV 07/22/2019 88.2  80.0 - 100.0 fL Final    MCH 07/22/2019 27.7  26.0 - 34.0 pg Final    MCHC 07/22/2019 31.3  31.0 - 36.0 g/dL Final    RDW 07/22/2019 14.3  12.4 - 15.4 % Final    Platelets 06/48/2673 183  135 - 450 K/uL Final    MPV 07/22/2019 8.4  5.0 - 10.5 fL Final    Neutrophils % 07/22/2019 57.9  % Final    Lymphocytes % 07/22/2019 33.7  % Final    Monocytes % 07/22/2019 7.1  % Final    Eosinophils % 07/22/2019 0.9  % Final    Basophils % 07/22/2019 0.4  % Final    Neutrophils Absolute 07/22/2019 4.7  1.7 - 7.7 K/uL Final    Lymphocytes Absolute 07/22/2019 2.7  1.0 - 5.1 K/uL Final    Monocytes Absolute 07/22/2019 0.6  0.0 - 1.3 K/uL Final    Eosinophils Absolute 07/22/2019 0.1  0.0 - 0.6 K/uL Final    Basophils Absolute 07/22/2019 0.0  0.0 - 0.2 K/uL Final    Sodium 07/22/2019 139  136 - 145 mmol/L Final    Potassium 07/22/2019 4.2  3.5 - 5.1 mmol/L Final    Chloride 07/22/2019 102  99 - 110 mmol/L Final    CO2 07/22/2019 28  21 - 32 mmol/L Final    Anion Gap 07/22/2019 9  3 - 16 Final    Glucose 07/22/2019 164* 70 - 99 mg/dL Final    BUN 07/22/2019 35* 7 - 20 mg/dL Final    CREATININE 07/22/2019 1.3* 0.6 - 1.2 mg/dL Final    GFR Non- 07/22/2019 42* >60 Final    GFR  07/22/2019 50* >60 Final    Calcium 07/22/2019 9.3  8.3 - 10.6 mg/dL Final    Total Protein 07/22/2019 7.6  6.4 - 8.2 g/dL Final    Alb 07/22/2019 4.1  3.4 - 5.0 g/dL Final    Albumin/Globulin Ratio 07/22/2019 1.2  1.1 - 2.2 Final    Total Bilirubin 07/22/2019 <0.2  0.0 - 1.0 mg/dL Final    Alkaline Phosphatase 07/22/2019 147* 40 - 129 U/L Final    ALT 07/22/2019 48* 10 - 40 U/L Final    AST 07/22/2019 32  15 - 37 U/L Final    Globulin 07/22/2019 3.5  g/dL Final    Color, UA 07/22/2019 Straw  Straw/Yellow Final    Clarity, UA 07/22/2019 Clear  Clear Final    Glucose, Ur 07/22/2019 100* Negative mg/dL Final    Bilirubin Urine 07/22/2019 Negative  Negative Final    Ketones, Urine 07/22/2019 Negative  Negative mg/dL Final    Specific Gravity, UA 07/22/2019 1.020  1.005 - 1.030 Final    Blood, Urine 07/22/2019 TRACE-INTACT* Negative Final    pH, UA 07/22/2019 5.5  5.0 - 8.0 Final    Protein, UA 07/22/2019 TRACE* Negative mg/dL Final    Urobilinogen, Urine 07/22/2019 0.2  <2.0 E.U./dL Final    Nitrite, Urine 07/22/2019 POSITIVE* Negative Final    Leukocyte Esterase, Urine 07/22/2019 SMALL* Negative Final    Microscopic Examination 07/22/2019 YES   Final    Urine Type 07/22/2019 Not Specified   Final    WBC, UA 07/22/2019 * 0 - 5 /HPF Final    RBC, UA 07/22/2019 0-2  0 - 2 /HPF Final    Bacteria, UA 07/22/2019 2+* /HPF Final    Lipase 07/22/2019 15.0  13.0 - 60.0 U/L Final   Orders Only on 06/11/2019   Component Date Value Ref Range Status    Hemoglobin A1C 06/11/2019 6.8  See comment % Final    eAG 06/11/2019 148.5  mg/dL Final           Assessment and Plan     Antoni Stinson was seen today for diabetes. Diagnoses and all orders for this visit:    Diabetic polyneuropathy associated with type 2 diabetes mellitus ([address]    Uncontrolled type 2 diabetes mellitus with diabetic nephropathy, with long-term current use of insulin ([address]    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 ([address]          1: Type 2 DM complicated with nephropathy, neuropathy TIAs, carotid stenosis   Uncontrolled A1C 8.4%<  6.8% < 10.1%  << 8.1 <<  8% << 11.7%    A1C of <8 would be acceptable because of microvascular and macrovascular complications.      Poor adherence to medical plan, treatment adherence and diet plan   Dorette Hickory not approved     Need to bring meds Stop Humalog     C/w Lantus  22 units in am.   C/w Amaryl 4 mg with first meal of the day   C/w Jardiance 25 mg daily   C/w Metformin 1000mg bid   C/w Victoza 1.8mg in am     Her god daughter was sitting in the car. I wanted to tgo over the meds with her to make sure she is taking everything as prescribed. She refused saying her feet are not good, she can not walk. Will call them later to go over the meds. All instructions provided in written. Check Blood sugars 4 times per day. Log them along with insulin and send them every 2 weeks. Call for blood sugars less than 60 or more than 400. Eye exam: Last exam in March 12th 2018, Needs an exam now. Foot exam:  March 2020    Deformity/amputation: absent  Skin lesions/pre-ulcerative calluses: absent  Edema: right- negative, left- negative  Sensory exam: Monofilament sensation: normal  Plus at least one of the following:   Pulses: normal,   Vibration (128 Hz): Moderately decreased     Renal screen: High 68 Feb 2018, high 47 Jan 2019    Counselled for smoking cessation     TSH screen: Feb 2018   Saw CDE in 2016 with Rd.     2: HTN   High   Med adherence addressed     3: Hyperlipidemia   LDL: 45 , HDL: 45 , TGs: 130 Jan 2019     Leg pains present   Continue atorvastatin 20mg daily     RTC in 4 weeks with logs    Labs on the visit     EDUCATION:   Greater than 50% of this follow-up visit was spent in general counseling regarding   obesity, diet, exercise, importance of adherence to insulin regime, recognition and treatment of hypo and hyperglycemia,  glucose logging, proper diabetes management, diabetic complications with poor management and the importance of glycemic control in order to avoid the complications of diabetes. Risks and potential complications of diabetes were reviewed with the patient. Diabetes health maintenance plan and follow-up were discussed and understood by the patient.   We reviewed the importance of medication compliance and regular

## 2020-03-06 NOTE — PATIENT INSTRUCTIONS
Patient Education        Learning About Meal Planning for Diabetes  Why plan your meals? Meal planning can be a key part of managing diabetes. Planning meals and snacks with the right balance of carbohydrate, protein, and fat can help you keep your blood sugar at the target level you set with your doctor. You don't have to eat special foods. You can eat what your family eats, including sweets once in a while. But you do have to pay attention to how often you eat and how much you eat of certain foods. You may want to work with a dietitian or a certified diabetes educator. He or she can give you tips and meal ideas and can answer your questions about meal planning. This health professional can also help you reach a healthy weight if that is one of your goals. What plan is right for you? Your dietitian or diabetes educator may suggest that you start with the plate format or carbohydrate counting. The plate format  The plate format is a simple way to help you manage how you eat. You plan meals by learning how much space each food should take on a plate. Using the plate format helps you spread carbohydrate throughout the day. It can make it easier to keep your blood sugar level within your target range. It also helps you see if you're eating healthy portion sizes. To use the plate format, you put non-starchy vegetables on half your plate. Add meat or meat substitutes on one-quarter of the plate. Put a grain or starchy vegetable (such as brown rice or a potato) on the final quarter of the plate. You can add a small piece of fruit and some low-fat or fat-free milk or yogurt, depending on your carbohydrate goal for each meal.  Here are some tips for using the plate format:  · Make sure that you are not using an oversized plate. A 9-inch plate is best. Many restaurants use larger plates. · Get used to using the plate format at home. Then you can use it when you eat out.   · Write down your questions about using the plate format. Talk to your doctor, a dietitian, or a diabetes educator about your concerns. Carbohydrate counting  With carbohydrate counting, you plan meals based on the amount of carbohydrate in each food. Carbohydrate raises blood sugar higher and more quickly than any other nutrient. It is found in desserts, breads and cereals, and fruit. It's also found in starchy vegetables such as potatoes and corn, grains such as rice and pasta, and milk and yogurt. Spreading carbohydrate throughout the day helps keep your blood sugar levels within your target range. Your daily amount depends on several things, including your weight, how active you are, which diabetes medicines you take, and what your goals are for your blood sugar levels. A registered dietitian or diabetes educator can help you plan how much carbohydrate to include in each meal and snack. A guideline for your daily amount of carbohydrate is:  · 45 to 60 grams at each meal. That's about the same as 3 to 4 carbohydrate servings. · 15 to 20 grams at each snack. That's about the same as 1 carbohydrate serving. The Nutrition Facts label on packaged foods tells you how much carbohydrate is in a serving of the food. First, look at the serving size on the food label. Is that the amount you eat in a serving? All of the nutrition information on a food label is based on that serving size. So if you eat more or less than that, you'll need to adjust the other numbers. Total carbohydrate is the next thing you need to look for on the label. If you count carbohydrate servings, one serving of carbohydrate is 15 grams. For foods that don't come with labels, such as fresh fruits and vegetables, you'll need a guide that lists carbohydrate in these foods. Ask your doctor, dietitian, or diabetes educator about books or other nutrition guides you can use.   If you take insulin, you need to know how many grams of carbohydrate are in a meal. This lets you know how much rapid-acting insulin to take before you eat. If you use an insulin pump, you get a constant rate of insulin during the day. So the pump must be programmed at meals to give you extra insulin to cover the rise in blood sugar after meals. When you know how much carbohydrate you will eat, you can take the right amount of insulin. Or, if you always use the same amount of insulin, you need to make sure that you eat the same amount of carbohydrate at meals. If you need more help to understand carbohydrate counting and food labels, ask your doctor, dietitian, or diabetes educator. How do you get started with meal planning? Here are some tips to get started:  · Plan your meals a week at a time. Don't forget to include snacks too. · Use cookbooks or online recipes to plan several main meals. Plan some quick meals for busy nights. You also can double some recipes that freeze well. Then you can save half for other busy nights when you don't have time to cook. · Make sure you have the ingredients you need for your recipes. If you're running low on basic items, put these items on your shopping list too. · List foods that you use to make breakfasts, lunches, and snacks. List plenty of fruits and vegetables. · Post this list on the refrigerator. Add to it as you think of more things you need. · Take the list to the store to do your weekly shopping. Follow-up care is a key part of your treatment and safety. Be sure to make and go to all appointments, and call your doctor if you are having problems. It's also a good idea to know your test results and keep a list of the medicines you take. Where can you learn more? Go to [email or web address] and sign in to your [employer] account. Enter G778 in the [facility] box to learn more about \"Learning About Meal Planning for Diabetes. \"     If you do not have an account, please click on the \"Sign Up Now\" link.   Current as of: April 16, 2019  Content

## 2020-03-09 RX ORDER — GLIMEPIRIDE 4 MG/1
TABLET ORAL
Qty: 30 TABLET | Refills: 2 | Status: SHIPPED | OUTPATIENT
Start: 2020-03-09 | End: 2020-06-25

## 2020-03-09 NOTE — TELEPHONE ENCOUNTER
Medication:   Requested Prescriptions     Pending Prescriptions Disp Refills    glimepiride (AMARYL) 4 MG tablet [Pharmacy Med Name: GLIMEPIRIDE 4 MG TABLET] 30 tablet 2     Sig: TAKE 1 TABLET BY MOUTH EVERY DAY BEFORE BREAKFAST         Last appt: 3/6/2020   Next appt: 4/3/2020    Last OARRS:   RX Monitoring 7/22/2015   Attestation The Prescription Monitoring Report for this patient was reviewed today. Periodic Controlled Substance Monitoring Possible medication side effects, risk of tolerance and/or dependence, and alternative treatments discussed; No signs of potential drug abuse or diversion identified.

## 2020-03-11 NOTE — TELEPHONE ENCOUNTER
Left another message for Leola Darnell to call me back. Our records do not show her taking Admelog. RX refill denied.

## 2020-03-12 ENCOUNTER — TELEPHONE (OUTPATIENT)
Dept: ENDOCRINOLOGY | Age: 60
End: 2020-03-12

## 2020-03-13 NOTE — TELEPHONE ENCOUNTER
Nelli Briseno stated she has Admelog at home and used it this morning. She injected 15 units. Explained NOT TO USE ADMELOG OR HUMALOG. Nelli Briseno stated she understands. When I discussed medications with her last week she DID NOT mention having Admelog at home.

## 2020-04-03 ENCOUNTER — VIRTUAL VISIT (OUTPATIENT)
Dept: ENDOCRINOLOGY | Age: 60
End: 2020-04-03
Payer: MEDICAID

## 2020-04-03 PROCEDURE — 99441 PR PHYS/QHP TELEPHONE EVALUATION 5-10 MIN: CPT | Performed by: INTERNAL MEDICINE

## 2020-04-03 NOTE — PROGRESS NOTES
Patient ID:   Edwardo Burdick is a 63 y.o. female  Chief Complaint:   Edwardo Burdick presents for an evaluation of Type 2 Diabetes Mellitus , Hyperlipidemia and hypertension. Pursuant to the emergency declaration under the [facility], [facility] authority and the [employer] Act this Telephone Visit was insisted, with patient's consent, to reduce the patient's risk of exposure to COVID-19 and provide continuity of care for an established patient. Services were provided through a synchronous discussion over a telephone to substitute for in-person clinic visit. Subjective:   Type 2 Diabetes Mellitus diagnosed around 2010  On insulin since 2012  Previous regimen:Taking Admelog 15 units tidac and 5 units for snacks. Basaglar 40 units once a day at night. VGO stopped due to hypoglycemias     She is not sure about insulin doses   Humalog stopped on last visit as she was missing lantus dose with Humalogs     Lantus 22 units daily in am.  Amaryl 4 mg with first meal of the day. Jardiance 25 mg daily  Metformin 1000mg bid   Victoza 1.8mg in am. Last  Dec 2019     Admits making poor dietary choices, not checking blood sugars , poor adherence overall   Multiple cancellations/no shows      Checks blood sugars 0-1 times per day. Reportedly [id number]. Am:     Lunch:   Supper:    HS:        Hypoglycemias: Once in last 4 weeks      Meals: 3, dinner is big. Snacks (jellos, fruits, pretzels) after every meal because she is hungry. Exercise: None    Denies chest pain, exertional dyspnea. Family history of CAD: Both parents  Denies smoking/ alcohol.    Currently on  mg daily     The following portions of the patient's history were reviewed and updated as appropriate:       Family History   Problem Relation Age of Onset    Cancer Mother         breast    Cancer Father     Heart Failure Neg Hx     High Cholesterol Neg Hx     Hypertension Neg Hx     Migraines Neg Hx     Rashes/Skin Problems Neg Hx     Seizures Neg Hx     Stroke Neg Hx     Thyroid Disease Neg Hx     Diabetes Neg Hx          Social History     Socioeconomic History    Marital status:      Spouse name: Not on file    Number of children: Not on file    Years of education: Not on file    Highest education level: Not on file   Occupational History    Occupation:    Social Needs    Financial resource strain: Not on file    Food insecurity     Worry: Not on file     Inability: Not on file    Transportation needs     Medical: Not on file     Non-medical: Not on file   Tobacco Use    Smoking status: Former Smoker     Packs/day: 0.50     Years: 20.00     Pack years: 10.00     Types: Cigarettes, Cigars     Last attempt to quit: 7/3/2014     Years since quittin.7    Smokeless tobacco: Never Used   Substance and Sexual Activity    Alcohol use: No     Alcohol/week: 0.0 standard drinks    Drug use: No    Sexual activity: Never   Lifestyle    Physical activity     Days per week: Not on file     Minutes per session: Not on file    Stress: Not on file   Relationships    Social connections     Talks on phone: Not on file     Gets together: Not on file     Attends Sikhism service: Not on file     Active member of club or organization: Not on file     Attends meetings of clubs or organizations: Not on file     Relationship status: Not on file    Intimate partner violence     Fear of current or ex partner: Not on file     Emotionally abused: Not on file     Physically abused: Not on file     Forced sexual activity: Not on file   Other Topics Concern    Not on file   Social History Narrative    Not on file       Past Medical History:   Diagnosis Date    Abnormal brain MRI 2017    Partially empty sella and minimal chronic small vessel ischemic disease    Acute bilateral low back pain without sciatica 2016    complexes are now PresentConfirmed by Christopher Barnett MD, BRIA ([id number]) on 1/23/2020 1:44:57 PM   Final    Color, UA 01/22/2020 Yellow  Straw/Yellow Final    Clarity, UA 01/22/2020 Clear  Clear Final    Glucose, Ur 01/22/2020 >=1000* Negative mg/dL Final    Bilirubin Urine 01/22/2020 Negative  Negative Final    Ketones, Urine 01/22/2020 Negative  Negative mg/dL Final    Specific Gravity, UA 01/22/2020 <=1.005  1.005 - 1.030 Final    Blood, Urine 01/22/2020 Negative  Negative Final    pH, UA 01/22/2020 5.0  5.0 - 8.0 Final    Protein, UA 01/22/2020 Negative  Negative mg/dL Final    Urobilinogen, Urine 01/22/2020 0.2  <2.0 E.U./dL Final    Nitrite, Urine 01/22/2020 POSITIVE* Negative Final    Leukocyte Esterase, Urine 01/22/2020 Negative  Negative Final    Microscopic Examination 01/22/2020 YES   Final    Urine Type 01/22/2020 NotGiven   Final    Glucose 01/22/2020 529  mg/dL Final    Lactic Acid, Sepsis 01/22/2020 2.8* 0.4 - 1.9 mmol/L Final    pH, Kyle 01/22/2020 7.279* 7.350 - 7.450 Final    pCO2, Kyle 01/22/2020 54.2* 40.0 - 50.0 mmHg Final    pO2, Kyle 01/22/2020 47.1* 25.0 - 40.0 mmHg Final    HCO3, Venous 01/22/2020 24.8  23.0 - 29.0 mmol/L Final    Base Excess, Kyle 01/22/2020 -2.5  -3.0 - 3.0 mmol/L Final    O2 Sat, Kyle 01/22/2020 81  Not Established % Final    Carboxyhemoglobin 01/22/2020 1.8* 0.0 - 1.5 % Final    MetHgb, Kyle 01/22/2020 0.7  <1.5 % Final    TC02 (Calc), Kyle 01/22/2020 27  Not Established mmol/L Final    O2 Content, Kyle 01/22/2020 14  Not Established VOL % Final    O2 Therapy 01/22/2020 Unknown   Final    WBC, UA 01/22/2020 6-10* 0 - 5 /HPF Final    RBC, UA 01/22/2020 None seen  0 - 2 /HPF Final    Bacteria, UA 01/22/2020 1+* /HPF Final    POC Glucose 01/22/2020 529* 70 - 99 mg/dl Final    Performed on 01/22/2020 ACCU-CHEK   Final    Sodium 01/22/2020 126* 136 - 145 mmol/L Final    Potassium 01/22/2020 4.2  3.5 - 5.1 mmol/L Final    Chloride 01/22/2020 95* 99 - 110 12.4 - 15.4 % Final    Platelets 65/13/8775 183  135 - 450 K/uL Final    MPV 07/22/2019 8.4  5.0 - 10.5 fL Final    Neutrophils % 07/22/2019 57.9  % Final    Lymphocytes % 07/22/2019 33.7  % Final    Monocytes % 07/22/2019 7.1  % Final    Eosinophils % 07/22/2019 0.9  % Final    Basophils % 07/22/2019 0.4  % Final    Neutrophils Absolute 07/22/2019 4.7  1.7 - 7.7 K/uL Final    Lymphocytes Absolute 07/22/2019 2.7  1.0 - 5.1 K/uL Final    Monocytes Absolute 07/22/2019 0.6  0.0 - 1.3 K/uL Final    Eosinophils Absolute 07/22/2019 0.1  0.0 - 0.6 K/uL Final    Basophils Absolute 07/22/2019 0.0  0.0 - 0.2 K/uL Final    Sodium 07/22/2019 139  136 - 145 mmol/L Final    Potassium 07/22/2019 4.2  3.5 - 5.1 mmol/L Final    Chloride 07/22/2019 102  99 - 110 mmol/L Final    CO2 07/22/2019 28  21 - 32 mmol/L Final    Anion Gap 07/22/2019 9  3 - 16 Final    Glucose 07/22/2019 164* 70 - 99 mg/dL Final    BUN 07/22/2019 35* 7 - 20 mg/dL Final    CREATININE 07/22/2019 1.3* 0.6 - 1.2 mg/dL Final    GFR Non- 07/22/2019 42* >60 Final    GFR  07/22/2019 50* >60 Final    Calcium 07/22/2019 9.3  8.3 - 10.6 mg/dL Final    Total Protein 07/22/2019 7.6  6.4 - 8.2 g/dL Final    Alb 07/22/2019 4.1  3.4 - 5.0 g/dL Final    Albumin/Globulin Ratio 07/22/2019 1.2  1.1 - 2.2 Final    Total Bilirubin 07/22/2019 <0.2  0.0 - 1.0 mg/dL Final    Alkaline Phosphatase 07/22/2019 147* 40 - 129 U/L Final    ALT 07/22/2019 48* 10 - 40 U/L Final    AST 07/22/2019 32  15 - 37 U/L Final    Globulin 07/22/2019 3.5  g/dL Final    Color, UA 07/22/2019 Straw  Straw/Yellow Final    Clarity, UA 07/22/2019 Clear  Clear Final    Glucose, Ur 07/22/2019 100* Negative mg/dL Final    Bilirubin Urine 07/22/2019 Negative  Negative Final    Ketones, Urine 07/22/2019 Negative  Negative mg/dL Final    Specific Gravity, UA 07/22/2019 1.020  1.005 - 1.030 Final    Blood, Urine 07/22/2019 TRACE-INTACT* Negative in March 12th 2018, Needs an exam now. Foot exam:  March 2020    Deformity/amputation: absent  Skin lesions/pre-ulcerative calluses: absent  Edema: right- negative, left- negative  Sensory exam: Monofilament sensation: normal  Plus at least one of the following:   Pulses: normal,   Vibration (128 Hz): Moderately decreased     Renal screen: High 68 Feb 2018, high 47 Jan 2019    Counselled for smoking cessation     TSH screen: Feb 2018   Saw CDE in 2016 with Rd.     2: HTN   High on last visit     3: Hyperlipidemia   LDL: 45 , HDL: 45 , TGs: 130 Jan 2019     Leg pains present   Continue atorvastatin 20mg daily     RTC in 4 weeks with logs    Labs on the visit       RTC in 1 month   OR via telemedicine depending on COVID-19 restrictions at the time of their next appointment. A total of 9 minutes was spent conversing with the patient and over half of that time was spent counseling the patient on endocrine related medical issues. EDUCATION:   Greater than 50% of this follow-up visit was spent in general counseling regarding   obesity, diet, exercise, importance of adherence to insulin regime, recognition and treatment of hypo and hyperglycemia,  glucose logging, proper diabetes management, diabetic complications with poor management and the importance of glycemic control in order to avoid the complications of diabetes. Risks and potential complications of diabetes were reviewed with the patient. Diabetes health maintenance plan and follow-up were discussed and understood by the patient. We reviewed the importance of medication compliance and regular follow-up. Aggressive lifestyle modification was encouraged. Exercise Counselling: This patient is a candidate for regular physical exercise.  Instructions to perform the following types of exercise:  Swimming or water aerobic exercise  Brisk walking  Playing tennis  Stationary bicycle or elliptical indoor  Low impact aerobic exercise    Instructions given to exercise for the following duration:  30 minutes a day for five-seven days per week.     Following instructions for being active throughout the day in addition to formal exercise:  Walk instead of drive whenever possible  Take the stairs instead of the elevator  Work in the garden  Park to the far end of the parking lot to add more walking steps to destination      Electronically signed by Jayce Dewitt MD on 4/3/2020 at 9:59 AM

## 2020-04-09 ENCOUNTER — TELEPHONE (OUTPATIENT)
Dept: ENDOCRINOLOGY | Age: 60
End: 2020-04-09

## 2020-04-09 RX ORDER — LISINOPRIL 10 MG/1
TABLET ORAL
Qty: 90 TABLET | Refills: 3 | Status: ON HOLD | OUTPATIENT
Start: 2020-04-09 | End: 2020-06-06 | Stop reason: SDUPTHER

## 2020-04-22 ENCOUNTER — APPOINTMENT (OUTPATIENT)
Dept: GENERAL RADIOLOGY | Age: 60
End: 2020-04-22
Payer: MEDICAID

## 2020-04-22 ENCOUNTER — HOSPITAL ENCOUNTER (EMERGENCY)
Age: 60
Discharge: HOME | End: 2020-04-22
Attending: EMERGENCY MEDICINE
Payer: MEDICAID

## 2020-04-22 VITALS
HEART RATE: 73 BPM | WEIGHT: 163 LBS | RESPIRATION RATE: 20 BRPM | OXYGEN SATURATION: 99 % | BODY MASS INDEX: 28.88 KG/M2 | HEIGHT: 63 IN | DIASTOLIC BLOOD PRESSURE: 65 MMHG | TEMPERATURE: 98.8 F | SYSTOLIC BLOOD PRESSURE: 150 MMHG

## 2020-04-22 LAB
A/G RATIO: 1.2 (ref 1.1–2.2)
ALBUMIN SERPL-MCNC: 4.3 G/DL (ref 3.4–5)
ALP BLD-CCNC: 142 U/L (ref 40–129)
ALT SERPL-CCNC: 25 U/L (ref 10–40)
ANION GAP SERPL CALCULATED.3IONS-SCNC: 11 MMOL/L (ref 3–16)
ANION GAP SERPL CALCULATED.3IONS-SCNC: 15 MMOL/L (ref 3–16)
AST SERPL-CCNC: 16 U/L (ref 15–37)
BASE EXCESS VENOUS: -2.6 MMOL/L (ref -3–3)
BASOPHILS ABSOLUTE: 0 K/UL (ref 0–0.2)
BASOPHILS RELATIVE PERCENT: 0.5 %
BILIRUB SERPL-MCNC: <0.2 MG/DL (ref 0–1)
BILIRUBIN URINE: NEGATIVE
BLOOD, URINE: NEGATIVE
BUN BLDV-MCNC: 21 MG/DL (ref 7–20)
BUN BLDV-MCNC: 23 MG/DL (ref 7–20)
CALCIUM SERPL-MCNC: 8.6 MG/DL (ref 8.3–10.6)
CALCIUM SERPL-MCNC: 9.6 MG/DL (ref 8.3–10.6)
CARBOXYHEMOGLOBIN: 1.2 % (ref 0–1.5)
CHLORIDE BLD-SCNC: 108 MMOL/L (ref 99–110)
CHLORIDE BLD-SCNC: 96 MMOL/L (ref 99–110)
CLARITY: CLEAR
CO2: 23 MMOL/L (ref 21–32)
CO2: 24 MMOL/L (ref 21–32)
COLOR: ABNORMAL
CREAT SERPL-MCNC: 1.2 MG/DL (ref 0.6–1.2)
CREAT SERPL-MCNC: 1.5 MG/DL (ref 0.6–1.2)
EOSINOPHILS ABSOLUTE: 0.1 K/UL (ref 0–0.6)
EOSINOPHILS RELATIVE PERCENT: 1.1 %
GFR AFRICAN AMERICAN: 43
GFR AFRICAN AMERICAN: 55
GFR NON-AFRICAN AMERICAN: 35
GFR NON-AFRICAN AMERICAN: 46
GLOBULIN: 3.7 G/DL
GLUCOSE BLD-MCNC: 103 MG/DL (ref 70–99)
GLUCOSE BLD-MCNC: 220 MG/DL (ref 70–99)
GLUCOSE BLD-MCNC: 454 MG/DL (ref 70–99)
GLUCOSE BLD-MCNC: 468 MG/DL (ref 70–99)
GLUCOSE BLD-MCNC: 55 MG/DL (ref 70–99)
GLUCOSE BLD-MCNC: 57 MG/DL (ref 70–99)
GLUCOSE BLD-MCNC: 58 MG/DL (ref 70–99)
GLUCOSE URINE: >=1000 MG/DL
HCO3 VENOUS: 25.2 MMOL/L (ref 23–29)
HCT VFR BLD CALC: 34.2 % (ref 36–48)
HEMOGLOBIN: 10.8 G/DL (ref 12–16)
KETONES, URINE: NEGATIVE MG/DL
LEUKOCYTE ESTERASE, URINE: NEGATIVE
LYMPHOCYTES ABSOLUTE: 1.6 K/UL (ref 1–5.1)
LYMPHOCYTES RELATIVE PERCENT: 27 %
MAGNESIUM: 2 MG/DL (ref 1.8–2.4)
MCH RBC QN AUTO: 28.3 PG (ref 26–34)
MCHC RBC AUTO-ENTMCNC: 31.4 G/DL (ref 31–36)
MCV RBC AUTO: 89.9 FL (ref 80–100)
METHEMOGLOBIN VENOUS: 0.4 %
MICROSCOPIC EXAMINATION: ABNORMAL
MONOCYTES ABSOLUTE: 0.3 K/UL (ref 0–1.3)
MONOCYTES RELATIVE PERCENT: 4.9 %
NEUTROPHILS ABSOLUTE: 3.9 K/UL (ref 1.7–7.7)
NEUTROPHILS RELATIVE PERCENT: 66.5 %
NITRITE, URINE: NEGATIVE
O2 CONTENT, VEN: 10 VOL %
O2 SAT, VEN: 64 %
O2 THERAPY: ABNORMAL
PCO2, VEN: 57.7 MMHG (ref 40–50)
PDW BLD-RTO: 13.7 % (ref 12.4–15.4)
PERFORMED ON: ABNORMAL
PH UA: 5.5 (ref 5–8)
PH VENOUS: 7.26 (ref 7.35–7.45)
PHOSPHORUS: 4.5 MG/DL (ref 2.5–4.9)
PLATELET # BLD: 168 K/UL (ref 135–450)
PMV BLD AUTO: 9.5 FL (ref 5–10.5)
PO2, VEN: 36.6 MMHG (ref 25–40)
POTASSIUM SERPL-SCNC: 3.7 MMOL/L (ref 3.5–5.1)
POTASSIUM SERPL-SCNC: 4.4 MMOL/L (ref 3.5–5.1)
PRO-BNP: 107 PG/ML (ref 0–124)
PROTEIN UA: NEGATIVE MG/DL
RBC # BLD: 3.81 M/UL (ref 4–5.2)
SODIUM BLD-SCNC: 135 MMOL/L (ref 136–145)
SODIUM BLD-SCNC: 142 MMOL/L (ref 136–145)
SPECIFIC GRAVITY UA: 1.01 (ref 1–1.03)
TCO2 CALC VENOUS: 27 MMOL/L
TOTAL PROTEIN: 8 G/DL (ref 6.4–8.2)
TROPONIN: <0.01 NG/ML
URINE TYPE: ABNORMAL
UROBILINOGEN, URINE: 0.2 E.U./DL
WBC # BLD: 5.8 K/UL (ref 4–11)

## 2020-04-22 PROCEDURE — 83735 ASSAY OF MAGNESIUM: CPT

## 2020-04-22 PROCEDURE — 2580000003 HC RX 258: Performed by: EMERGENCY MEDICINE

## 2020-04-22 PROCEDURE — 36415 COLL VENOUS BLD VENIPUNCTURE: CPT

## 2020-04-22 PROCEDURE — 83880 ASSAY OF NATRIURETIC PEPTIDE: CPT

## 2020-04-22 PROCEDURE — 82803 BLOOD GASES ANY COMBINATION: CPT

## 2020-04-22 PROCEDURE — 99285 EMERGENCY DEPT VISIT HI MDM: CPT

## 2020-04-22 PROCEDURE — 6370000000 HC RX 637 (ALT 250 FOR IP): Performed by: EMERGENCY MEDICINE

## 2020-04-22 PROCEDURE — 84484 ASSAY OF TROPONIN QUANT: CPT

## 2020-04-22 PROCEDURE — 81003 URINALYSIS AUTO W/O SCOPE: CPT

## 2020-04-22 PROCEDURE — 84100 ASSAY OF PHOSPHORUS: CPT

## 2020-04-22 PROCEDURE — 80053 COMPREHEN METABOLIC PANEL: CPT

## 2020-04-22 PROCEDURE — 71045 X-RAY EXAM CHEST 1 VIEW: CPT

## 2020-04-22 PROCEDURE — 96374 THER/PROPH/DIAG INJ IV PUSH: CPT

## 2020-04-22 PROCEDURE — 85025 COMPLETE CBC W/AUTO DIFF WBC: CPT

## 2020-04-22 RX ORDER — 0.9 % SODIUM CHLORIDE 0.9 %
1000 INTRAVENOUS SOLUTION INTRAVENOUS ONCE
Status: COMPLETED | OUTPATIENT
Start: 2020-04-22 | End: 2020-04-22

## 2020-04-22 RX ADMIN — INSULIN HUMAN 10 UNITS: 100 INJECTION, SOLUTION PARENTERAL at 19:40

## 2020-04-22 RX ADMIN — SODIUM CHLORIDE 1000 ML: 9 INJECTION, SOLUTION INTRAVENOUS at 19:41

## 2020-04-22 RX ADMIN — SODIUM CHLORIDE 1000 ML: 9 INJECTION, SOLUTION INTRAVENOUS at 20:53

## 2020-04-22 ASSESSMENT — ENCOUNTER SYMPTOMS
SHORTNESS OF BREATH: 0
CHEST TIGHTNESS: 0
STRIDOR: 0
NAUSEA: 0
COUGH: 0
RHINORRHEA: 0
VOMITING: 0
DIARRHEA: 0
WHEEZING: 0
TROUBLE SWALLOWING: 0
ABDOMINAL PAIN: 0
SORE THROAT: 0

## 2020-04-22 NOTE — ED PROVIDER NOTES
[facility]  ED  EMERGENCYDEPARTMENT ENCOUNTER      Pt Name: Wendie Segura  MRN: [id number]  [place] 1960  Date of evaluation: 4/22/2020  Ciara Canales MD    [address] [place] [address]       Chief Complaint   Patient presents with    Hyperglycemia     pt reporting high blood sugar >500 today. HISTORY OF PRESENT ILLNESS   (Location/Symptom, Timing/Onset,Context/Setting, Quality, Duration, Modifying Factors, Severity)  Note limiting factors. Wendie Segura is a 63 y.o. female who presents to the emergency department for hyperglycemia. Patient reports when she was checking her glucose it just read 'high.' Patient states her sugars normally rune 200-300. Denies fever, cough, sob, cp, dysuria. States she has been dizzy and lightheaded today. HPI    Nursing Notes were reviewed. REVIEW OF SYSTEMS    (2-9 systems for level 4, 10 or more for level 5)     Review of Systems   Constitutional: Negative for activity change, appetite change, diaphoresis and fever. HENT: Negative for congestion, rhinorrhea, sore throat and trouble swallowing. Respiratory: Negative for cough, chest tightness, shortness of breath, wheezing and stridor. Cardiovascular: Negative for chest pain and palpitations. Gastrointestinal: Negative for abdominal pain, diarrhea, nausea and vomiting. Genitourinary: Negative for dysuria and flank pain. Skin: Negative for rash and wound. Neurological: Positive for light-headedness. Negative for dizziness, weakness, numbness and headaches. Except as noted above the remainder of the review of systems was reviewedand negative.        PAST MEDICAL HISTORY     Past Medical History:   Diagnosis Date    Abnormal brain MRI 7/20/2017    Partially empty sella and minimal chronic small vessel ischemic disease    Acute bilateral low back pain without sciatica 11/2/2016    SHARRON (acute kidney injury) ([facility] 7/5/2017    Arthritis     back    Bipolar disorder ([facility] TABLET    TAKE 1 TABLET BY MOUTH EVERY DAY BEFORE BREAKFAST    GLUCAGON 1 MG INJECTION    Inject 1 mg into the muscle See Admin Instructions Follow package directions for low blood sugar. INSULIN GLARGINE (LANTUS SOLOSTAR) 100 UNIT/ML INJECTION PEN    Inject 22 Units into the skin every morning    INSULIN PEN NEEDLE (UNIFINE PENTIPS) 32G X 4 MM MISC    6 times daily for victoza and insulins. INSULIN PEN NEEDLE 32G X 4 MM MISC    1 each by Does not apply route 3 times daily    INSULIN SYRINGE-NEEDLE U-100 31G X 5/16\" 0.3 ML MISC    USE 3 TIMES A DAY BEFORE MEALS    JARDIANCE 25 MG TABLET        LETROZOLE (FEMARA) 2.5 MG TABLET    TAKE 1 TABLET BY MOUTH EVERY DAY    LISINOPRIL (PRINIVIL;ZESTRIL) 10 MG TABLET    TAKE 1 TABLET BY MOUTH DAILY    METFORMIN (GLUCOPHAGE) 1000 MG TABLET    TAKE 1 TABLET WITH MEALS TWICE A DAY    OXYCODONE-ACETAMINOPHEN (PERCOCET) 5-325 MG PER TABLET    Take 1 tablet by mouth every 6 hours as needed for Pain. Tia Kilpatrick PALIPERIDONE (INVEGA) 9 MG EXTENDED RELEASE TABLET    Take 9 mg by mouth every morning     SIMVASTATIN (ZOCOR) 20 MG TABLET    Take 20 mg by mouth nightly     TRAZODONE (DESYREL) 100 MG TABLET    Take 100 mg by mouth nightly    TRUE METRIX BLOOD GLUCOSE TEST STRIP    USE AS DIRECTED TO TEST 4 TIMES A DAY    VICTOZA 18 MG/3ML SOPN SC INJECTION    INJECT 1.8 MG UNDER THE SKIN ONCE DAILY       ALLERGIES     Morphine; Penicillins;  Codeine; and Penicillin g    FAMILY HISTORY       Family History   Problem Relation Age of Onset    Cancer Mother         breast    Cancer Father     Heart Failure Neg Hx     High Cholesterol Neg Hx     Hypertension Neg Hx     Migraines Neg Hx     Rashes/Skin Problems Neg Hx     Seizures Neg Hx     Stroke Neg Hx     Thyroid Disease Neg Hx     Diabetes Neg Hx           SOCIAL HISTORY       Social History     Socioeconomic History    Marital status:      Spouse name: Not on file    Number of children: Not on file    Years of education: Not on file    Highest education level: Not on file   Occupational History    Occupation:    Social Needs    Financial resource strain: Not on file    Food insecurity     Worry: Not on file     Inability: Not on file    Transportation needs     Medical: Not on file     Non-medical: Not on file   Tobacco Use    Smoking status: Former Smoker     Packs/day: 0.50     Years: 20.00     Pack years: 10.00     Types: Cigarettes, Cigars     Last attempt to quit: 7/3/2014     Years since quittin.8    Smokeless tobacco: Never Used   Substance and Sexual Activity    Alcohol use: No     Alcohol/week: 0.0 standard drinks    Drug use: No    Sexual activity: Never   Lifestyle    Physical activity     Days per week: Not on file     Minutes per session: Not on file    Stress: Not on file   Relationships    Social connections     Talks on phone: Not on file     Gets together: Not on file     Attends Gnosticism service: Not on file     Active member of club or organization: Not on file     Attends meetings of clubs or organizations: Not on file     Relationship status: Not on file    Intimate partner violence     Fear of current or ex partner: Not on file     Emotionally abused: Not on file     Physically abused: Not on file     Forced sexual activity: Not on file   Other Topics Concern    Not on file   Social History Narrative    Not on file       SCREENINGS    Alberto Coma Scale  Eye Opening: Spontaneous  Best Verbal Response: Oriented  Best Motor Response: Obeys commands  Alberto Coma Scale Score: 15        PHYSICAL EXAM    (up to 7 for level 4, 8 ormore for level 5)     ED Triage Vitals   BP Temp Temp src Pulse Resp SpO2 Height Weight   20 1737 20 1736 -- 20 1737 20 1737 20 1737 20 1731 20 1731   (!) 156/75 99 °F (37.2 °C)  71 18 98 % 5' 3\" (1.6 m) 163 lb (73.9 kg)       Physical Exam  Constitutional:       General: She is not in acute distress. represents superimposed right   breast shadow. Right basilar airspace disease cannot be excluded on this   portable study. When the patient is able, a follow-up PA and lateral   examination of the chest is recommended.                ED BEDSIDE ULTRASOUND:   Performed by ED Physician - none    LABS:  Labs Reviewed   CBC WITH AUTO DIFFERENTIAL - Abnormal; Notable for the following components:       Result Value    RBC 3.81 (*)     Hemoglobin 10.8 (*)     Hematocrit 34.2 (*)     All other components within normal limits    Narrative:     Performed at:  [place] [address] [employer]   Phone [phone]   COMPREHENSIVE METABOLIC PANEL - Abnormal; Notable for the following components:    Sodium 135 (*)     Chloride 96 (*)     Glucose 468 (*)     BUN 23 (*)     CREATININE 1.5 (*)     GFR Non- 35 (*)     GFR African American 43 (*)     Alkaline Phosphatase 142 (*)     All other components within normal limits    Narrative:     Performed at:  [place] [address] [employer]   Phone [phone]   URINALYSIS - Abnormal; Notable for the following components:    Glucose, Ur >=1000 (*)     All other components within normal limits    Narrative:     Performed at:  [address], [facility] [employer]   Phone [phone]   BLOOD GAS, VENOUS - Abnormal; Notable for the following components:    pH, Kyle 7.258 (*)     pCO2, Kyle 57.7 (*)     All other components within normal limits    Narrative:     Performed at:  [address], [facility] [employer]   Phone [phone]   BASIC METABOLIC PANEL - Abnormal; Notable for the following components:    Glucose 58 (*)     BUN 21 (*)     GFR Non- 46 (*)     GFR  55 (*)     All other components within normal limits    Narrative: Performed at:  [address], [address] [employer]   Phone [phone]   POCT GLUCOSE - Abnormal; Notable for the following components:    POC Glucose 454 (*)     All other components within normal limits    Narrative:     Performed at:  [address], [address] [employer]   Phone [phone]   POCT GLUCOSE - Abnormal; Notable for the following components:    POC Glucose 220 (*)     All other components within normal limits    Narrative:     Performed at:  [address], [address] [employer]   Phone [phone]   POCT GLUCOSE - Abnormal; Notable for the following components:    POC Glucose 57 (*)     All other components within normal limits    Narrative:     Performed at:  [address], [address] [employer]   Phone [phone]   POCT GLUCOSE - Abnormal; Notable for the following components:    POC Glucose 55 (*)     All other components within normal limits    Narrative:     Performed at:  [address], [facility] [employer]   Phone [phone]   POCT GLUCOSE - Abnormal; Notable for the following components:    POC Glucose 103 (*)     All other components within normal limits    Narrative:     Performed at:  [address], [facility] [employer]   Phone [phone]   TROPONIN    Narrative:     Performed at:  [address], [address] [employer]   Phone [phone] PEPTIDE    Narrative:     Performed at:  [address], [address] [employer]   Phone [phone]   MAGNESIUM    Narrative:     Performed at:  [address], [address] [employer]   Phone [phone]   PHOSPHORUS    Narrative:     Performed at:  [facility]) - [address],  [place], [place]   Phone [phone]   LACTIC ACID, PLASMA   POCT GLUCOSE   POCT GLUCOSE   POCT GLUCOSE       All other labs were within normal range ornot returned as of this dictation. EMERGENCY DEPARTMENT COURSE and DIFFERENTIAL DIAGNOSIS/MDM:   Vitals:    Vitals:    04/22/20 1946 04/22/20 2052 04/22/20 2120 04/22/20 2150   BP: (!) 132/56 (!) 142/61 (!) 144/68 (!) 139/54   Pulse: 68 67 67 66   Resp: 16 19 20 21   Temp:       SpO2: 99% 99% 100% 99%   Weight:       Height:             MDM    ED COURSE/MDM    -Jason Arteaga is a 63 y.o. female with a history of hypertension, diabetes, CKD, hyperlipidemia, A. fib presents to ED for elevated glucose. States that it read \"high\" and her machine. Upon arrival glucose 454.   -Patient seen and evaluated. Oldrecords reviewed. Workup was significant for creatinine of 1.5, elevated BUN of 23, GFR of 43. Glucose of 468 without anion gap. H&H of 10.8/34.2 that this appears to be at baseline for patient. UA negative for infection. Suspicious opacity in the inferior right hemithorax likely represent superimposed right breast shadow. Right basilar airspace disease cannot be excluded on this portable study. When the patient is able to follow-up chest x-ray is recommended. Given negative leukocytosis, denies symptoms of shortness of breath or cough and afebrile satting 99% on room air will defer treatment at this time. -Given 10 unit regular insulin IV with 2 IV fluid bolus. Repeat BMP shows improved creatinine to 1.2 with GFR of 55. Glucose was 58. Was then given  orange juice. Repeat glucose was 55 was then given more crackers and peanut butter. Repeat glucose 1 hour later was 103.  On reassessment patient states she is feeling good, and would like to be discharged as her transportation has arrived.    -Labs and imaging reviewed and results discussed with patient. on reassessment patient reports she is feeling much better, and just tired. No longer dizzy and lightheaded. She was ambulated and did well without assistance. plan for discharge home with close follow up with PCP was discussed with patient. Strict ED return precautions given for new/worsending symptoms. Patient in agreement withplan, verbally confirm understanding and have no further questions/concerns. REASSESSMENT      Well appearing, non toxic, alert, oriented speaking in full sentences and hemodynamically stable upon discharge      CRITICAL CARE TIME   Total Critical Care time was 0 minutes, excluding separately reportableprocedures. There was a high probability of clinicallysignificant/life threatening deterioration in the patient's condition which required my urgent intervention. CONSULTS:  None    PROCEDURES:  Unless otherwise noted below, none     Procedures    FINAL IMPRESSION      1. Hyperglycemia    2.  SHARRON (acute kidney injury) [facility])          DISPOSITION/PLAN   DISPOSITION        PATIENT REFERREDTO:  Bertram Fm Cash/Lucero Montezhipolito [address]  [phone]    Call in 1 day        DISCHARGE MEDICATIONS:  New Prescriptions    No medications on file          (Please note that portions of this note were completed with a voice recognition program.  Efforts were made to edit the dictations but occasionally wordsare mis-transcribed.)    Dennis Ribera MD (electronically signed)  Attending Emergency Physician            Dennis Ribera MD  04/22/20 0222

## 2020-04-23 ENCOUNTER — CARE COORDINATION (OUTPATIENT)
Dept: CARE COORDINATION | Age: 60
End: 2020-04-23

## 2020-04-23 NOTE — CARE COORDINATION
Patient contacted regarding Cash Gaytan. Care Transition Nurse/ Ambulatory Care Manager contacted the patient by telephone to perform post discharge assessment. Verified name and  with patient as identifiers. Provided introduction to self, and explanation of the CTN/ACM role, and reason for call due to risk factors for infection and/or exposure to COVID-19. Symptoms reviewed with patient who verbalized the following symptoms: no new symptoms and no worsening symptoms. Due to no new or worsening symptoms encounter was not routed to provider for escalation. Patient has following risk factors of: heart failure and diabetes. CTN/ACM reviewed discharge instructions, medical action plan and red flags such as increased shortness of breath, increasing fever and signs of decompensation with patient who verbalized understanding. Discussed exposure protocols and quarantine with CDC Guidelines What to do if you are sick with coronavirus disease 2019.  Patient was given an opportunity for questions and concerns. The patient agrees to contact the [facility] exposure line [phone], local [facility] department [place] [facility]: ([phone]) and PCP office for questions related to their healthcare. CTN/ACM provided contact information for future needs. Reviewed and educated patient on any new and changed medications related to discharge diagnosis     Patient/family/caregiver given information for [facility] Loop and agrees to enroll no  Patient's preferred e-mail: na   Patient's preferred phone number: na  Based on Loop alert triggers, patient will be contacted by nurse care manager for worsening symptoms. Plan for follow-up call in 5-7 days based on severity of symptoms and risk factors.

## 2020-04-23 NOTE — ED NOTES
Patient ambulated 10 feet from bedside to commode and around pt's room, pt tolerated well.      Edison Pires RN  04/22/20 2052

## 2020-04-29 ENCOUNTER — CARE COORDINATION (OUTPATIENT)
Dept: CARE COORDINATION | Age: 60
End: 2020-04-29

## 2020-05-01 ENCOUNTER — VIRTUAL VISIT (OUTPATIENT)
Dept: ENDOCRINOLOGY | Age: 60
End: 2020-05-01
Payer: MEDICAID

## 2020-05-01 PROCEDURE — 99213 OFFICE O/P EST LOW 20 MIN: CPT | Performed by: INTERNAL MEDICINE

## 2020-05-01 RX ORDER — MINERAL OIL 100 MG/100ML
OIL ORAL; TOPICAL
Qty: 1 DEVICE | Refills: 0 | Status: ON HOLD | OUTPATIENT
Start: 2020-05-01 | End: 2021-06-04 | Stop reason: HOSPADM

## 2020-05-01 NOTE — PATIENT INSTRUCTIONS
Patient Education        Home Blood Pressure Test: About This Test  What is it? A home blood pressure test allows you to keep track of your blood pressure at home. Blood pressure is a measure of the force of blood against the walls of your arteries. Blood pressure readings include two numbers, such as 130/80 (say \"130 over 80\"). The first number is the systolic pressure. The second number is the diastolic pressure. Why is this test done? You may do this test at home to:  · Find out if you have high blood pressure. · Track your blood pressure if you have high blood pressure. · Track how well medicine is working to reduce high blood pressure. · Check how lifestyle changes, such as weight loss and exercise, are affecting blood pressure. How do you prepare for the test?  For at least 30 minutes before you take your blood pressure, don't exercise or use caffeine, tobacco, or medicines that raise blood pressure. Take your blood pressure while you feel comfortable and relaxed. Sit quietly with both feet on the floor for at least 5 minutes before the test.  How is the test done? · Sit with your arm slightly bent and resting on a table so that your upper arm is at the same level as your heart. · Roll up your sleeve or take off your shirt to expose your upper arm. · Wrap the blood pressure cuff around your upper arm so that the lower edge of the cuff is about 1 inch above the bend of your elbow. Proceed with the following steps depending on if you are using an automatic or manual pressure monitor. Automatic blood pressure monitors  · Press the on/off button on the automatic monitor and wait until the ready-to-measure \"heart\" symbol appears next to zero in the display window. · Press the start button. The cuff will inflate and deflate by itself. · Your blood pressure numbers will appear on the screen. · Write your numbers in your log book, along with the date and time.   Manual blood pressure monitors  · Place the earpieces of a stethoscope in your ears, and place the bell of the stethoscope over the artery, just below the cuff. · Close the valve on the rubber inflating bulb. · Squeeze the bulb rapidly with your opposite hand to inflate the cuff until the dial or column of mercury reads about 30 mm Hg higher than your usual systolic pressure. If you do not know your usual pressure, inflate the cuff to 210 mm Hg or until the pulse at your wrist disappears. · Open the pressure valve just slightly by twisting or pressing the valve on the bulb. · As you watch the pressure slowly fall, note the level on the dial at which you first start to hear a pulsing or tapping sound through the stethoscope. This is your systolic blood pressure. · Continue letting the air out slowly. The sounds will become muffled and will finally disappear. Note the pressure when the sounds completely disappear. This is your diastolic blood pressure. Let out all the remaining air. · Write your numbers in your log book, along with the date and time. Follow-up care is a key part of your treatment and safety. Be sure to make and go to all appointments, and call your doctor if you are having problems. It's also a good idea to keep a list of the medicines you take. Where can you learn more? Go to [email or web address] and sign in to your LastRoom account. Enter C427 in the "[employer] box to learn more about \"Home Blood Pressure Test: About This Test.\"     If you do not have an account, please click on the \"Sign Up Now\" link. Current as of: December 15, 2019Content Version: 12.4  © 9003-0542 Healthwise, Incorporated. Care instructions adapted under license by [facility] ([facility]). If you have questions about a medical condition or this instruction, always ask your healthcare professional. [address] any warranty or liability for your use of this information.

## 2020-05-01 NOTE — PROGRESS NOTES
The following portions of the patient's history were reviewed and updated as appropriate:       Family History   Problem Relation Age of Onset    Cancer Mother         breast    Cancer Father     Heart Failure Neg Hx     High Cholesterol Neg Hx     Hypertension Neg Hx     Migraines Neg Hx     Rashes/Skin Problems Neg Hx     Seizures Neg Hx     Stroke Neg Hx     Thyroid Disease Neg Hx     Diabetes Neg Hx          Social History     Socioeconomic History    Marital status:      Spouse name: Not on file    Number of children: Not on file    Years of education: Not on file    Highest education level: Not on file   Occupational History    Occupation:    Social Needs    Financial resource strain: Not on file    Food insecurity     Worry: Not on file     Inability: Not on file    Transportation needs     Medical: Not on file     Non-medical: Not on file   Tobacco Use    Smoking status: Former Smoker     Packs/day: 0.50     Years: 20.00     Pack years: 10.00     Types: Cigarettes, Cigars     Last attempt to quit: 7/3/2014     Years since quittin.8    Smokeless tobacco: Never Used   Substance and Sexual Activity    Alcohol use: No     Alcohol/week: 0.0 standard drinks    Drug use: No    Sexual activity: Never   Lifestyle    Physical activity     Days per week: Not on file     Minutes per session: Not on file    Stress: Not on file   Relationships    Social connections     Talks on phone: Not on file     Gets together: Not on file     Attends Jain service: Not on file     Active member of club or organization: Not on file     Attends meetings of clubs or organizations: Not on file     Relationship status: Not on file    Intimate partner violence     Fear of current or ex partner: Not on file     Emotionally abused: Not on file     Physically abused: Not on file     Forced sexual activity: Not on file   Other Topics Concern    Not on file   Social History Narrative Swelling    Codeine Hives and Rash    Penicillin G Rash         Current Outpatient Medications:     Blood Pressure Monitor GINA, Check BP at home once or twice a day, Disp: 1 Device, Rfl: 0    lisinopril (PRINIVIL;ZESTRIL) 10 MG tablet, TAKE 1 TABLET BY MOUTH DAILY, Disp: 90 tablet, Rfl: 3    glimepiride (AMARYL) 4 MG tablet, TAKE 1 TABLET BY MOUTH EVERY DAY BEFORE BREAKFAST, Disp: 30 tablet, Rfl: 2    insulin glargine (LANTUS SOLOSTAR) 100 UNIT/ML injection pen, Inject 22 Units into the skin every morning (Patient taking differently: Inject 8 Units into the skin every morning ), Disp: 5 pen, Rfl: 3    JARDIANCE 25 MG tablet, , Disp: , Rfl:     Insulin Syringe-Needle U-100 31G X 5/16\" 0.3 ML MISC, USE 3 TIMES A DAY BEFORE MEALS, Disp: 90 each, Rfl: 7    TRUE METRIX BLOOD GLUCOSE TEST strip, USE AS DIRECTED TO TEST 4 TIMES A DAY, Disp: 100 strip, Rfl: 5    calcium carbonate-vitamin D (CALTRATE) 600-400 MG-UNIT TABS per tab, , Disp: , Rfl:     atorvastatin (LIPITOR) 20 MG tablet, Take 1 tablet by mouth daily, Disp: 30 tablet, Rfl: 11    VICTOZA 18 MG/3ML SOPN SC injection, INJECT 1.8 MG UNDER THE SKIN ONCE DAILY, Disp: 3 pen, Rfl: 0    letrozole (FEMARA) 2.5 MG tablet, TAKE 1 TABLET BY MOUTH EVERY DAY, Disp: , Rfl: 3    glucagon 1 MG injection, Inject 1 mg into the muscle See Admin Instructions Follow package directions for low blood sugar., Disp: 1 kit, Rfl: 0    Insulin Pen Needle (UNIFINE PENTIPS) 32G X 4 MM MISC, 6 times daily for victoza and insulins. , Disp: 200 each, Rfl: 11    clonazePAM (KLONOPIN) 1 MG tablet, Take 1 mg by mouth as needed.  ., Disp: , Rfl:     metFORMIN (GLUCOPHAGE) 1000 MG tablet, TAKE 1 TABLET WITH MEALS TWICE A DAY, Disp: 60 tablet, Rfl: 0    paliperidone (INVEGA) 9 MG extended release tablet, Take 9 mg by mouth every morning , Disp: , Rfl:     aspirin 81 MG tablet, Take 81 mg by mouth daily, Disp: , Rfl:     gabapentin (NEURONTIN) 600 MG tablet, Take 600 mg by mouth 3 04/22/2020 ACCU-CHEK   Final    POC Glucose 04/22/2020 55* 70 - 99 mg/dl Final    Performed on 04/22/2020 ACCU-CHEK   Final    POC Glucose 04/22/2020 103* 70 - 99 mg/dl Final    Performed on 04/22/2020 ACCU-CHEK   Final   Admission on 01/22/2020, Discharged on 01/23/2020   Component Date Value Ref Range Status    POC Glucose 01/22/2020 >600* 70 - 99 mg/dl Final    Performed on 01/22/2020 ACCU-CHEK   Final    WBC 01/22/2020 6.0  4.0 - 11.0 K/uL Final    RBC 01/22/2020 4.11  4.00 - 5.20 M/uL Final    Hemoglobin 01/22/2020 11.3* 12.0 - 16.0 g/dL Final    Hematocrit 01/22/2020 35.6* 36.0 - 48.0 % Final    MCV 01/22/2020 86.4  80.0 - 100.0 fL Final    MCH 01/22/2020 27.4  26.0 - 34.0 pg Final    MCHC 01/22/2020 31.7  31.0 - 36.0 g/dL Final    RDW 01/22/2020 13.2  12.4 - 15.4 % Final    Platelets 88/53/0254 156  135 - 450 K/uL Final    MPV 01/22/2020 10.6* 5.0 - 10.5 fL Final    Neutrophils % 01/22/2020 65.1  % Final    Lymphocytes % 01/22/2020 28.9  % Final    Monocytes % 01/22/2020 5.2  % Final    Eosinophils % 01/22/2020 0.6  % Final    Basophils % 01/22/2020 0.2  % Final    Neutrophils Absolute 01/22/2020 3.9  1.7 - 7.7 K/uL Final    Lymphocytes Absolute 01/22/2020 1.7  1.0 - 5.1 K/uL Final    Monocytes Absolute 01/22/2020 0.3  0.0 - 1.3 K/uL Final    Eosinophils Absolute 01/22/2020 0.0  0.0 - 0.6 K/uL Final    Basophils Absolute 01/22/2020 0.0  0.0 - 0.2 K/uL Final    Sodium 01/22/2020 127* 136 - 145 mmol/L Final    Potassium 01/22/2020 4.2  3.5 - 5.1 mmol/L Final    Chloride 01/22/2020 89* 99 - 110 mmol/L Final    CO2 01/22/2020 25  21 - 32 mmol/L Final    Anion Gap 01/22/2020 13  3 - 16 Final    Glucose 01/22/2020 731* 70 - 99 mg/dL Final    BUN 01/22/2020 15  7 - 20 mg/dL Final    CREATININE 01/22/2020 1.3* 0.6 - 1.2 mg/dL Final    GFR Non- 01/22/2020 42* >60 Final    GFR  01/22/2020 50* >60 Final    Calcium 01/22/2020 8.9  8.3 - 10.6 mg/dL Final Final    pCO2, Kyle 01/22/2020 54.2* 40.0 - 50.0 mmHg Final    pO2, Kyle 01/22/2020 47.1* 25.0 - 40.0 mmHg Final    HCO3, Venous 01/22/2020 24.8  23.0 - 29.0 mmol/L Final    Base Excess, Kyle 01/22/2020 -2.5  -3.0 - 3.0 mmol/L Final    O2 Sat, Kyle 01/22/2020 81  Not Established % Final    Carboxyhemoglobin 01/22/2020 1.8* 0.0 - 1.5 % Final    MetHgb, Kyle 01/22/2020 0.7  <1.5 % Final    TC02 (Calc), Kyle 01/22/2020 27  Not Established mmol/L Final    O2 Content, Kyle 01/22/2020 14  Not Established VOL % Final    O2 Therapy 01/22/2020 Unknown   Final    WBC, UA 01/22/2020 6-10* 0 - 5 /HPF Final    RBC, UA 01/22/2020 None seen  0 - 2 /HPF Final    Bacteria, UA 01/22/2020 1+* /HPF Final    POC Glucose 01/22/2020 529* 70 - 99 mg/dl Final    Performed on 01/22/2020 ACCU-CHEK   Final    Sodium 01/22/2020 126* 136 - 145 mmol/L Final    Potassium 01/22/2020 4.2  3.5 - 5.1 mmol/L Final    Chloride 01/22/2020 95* 99 - 110 mmol/L Final    CO2 01/22/2020 23  21 - 32 mmol/L Final    Anion Gap 01/22/2020 8  3 - 16 Final    Glucose 01/22/2020 607* 70 - 99 mg/dL Final    BUN 01/22/2020 15  7 - 20 mg/dL Final    CREATININE 01/22/2020 1.2  0.6 - 1.2 mg/dL Final    GFR Non- 01/22/2020 46* >60 Final    GFR  01/22/2020 55* >60 Final    Calcium 01/22/2020 8.3  8.3 - 10.6 mg/dL Final    Lactic Acid, Sepsis 01/22/2020 2.3* 0.4 - 1.9 mmol/L Final    Lactic Acid, Sepsis 01/23/2020 1.2  0.4 - 1.9 mmol/L Final    POC Glucose 01/22/2020 382* 70 - 99 mg/dl Final    Performed on 01/22/2020 ACCU-CHEK   Final    Sodium 01/23/2020 140  136 - 145 mmol/L Final    Potassium reflex Magnesium 01/23/2020 3.5  3.5 - 5.1 mmol/L Final    Chloride 01/23/2020 108  99 - 110 mmol/L Final    CO2 01/23/2020 23  21 - 32 mmol/L Final    Anion Gap 01/23/2020 9  3 - 16 Final    Glucose 01/23/2020 53* 70 - 99 mg/dL Final    BUN 01/23/2020 14  7 - 20 mg/dL Final    CREATININE 01/23/2020 1.1  0.6 - 1.2 mg/dL ACCU-CHEK   Final    Magnesium 01/23/2020 1.60* 1.80 - 2.40 mg/dL Final    POC Glucose 01/23/2020 362* 70 - 99 mg/dl Final    Performed on 01/23/2020 ACCU-CHEK   Final    POC Glucose 01/23/2020 228* 70 - 99 mg/dl Final    Performed on 01/23/2020 ACCU-CHEK   Final   Orders Only on 12/20/2019   Component Date Value Ref Range Status    Hemoglobin A1C 12/20/2019 8.4  See comment % Final    eAG 12/20/2019 194.4  mg/dL Final   Admission on 07/22/2019, Discharged on 07/23/2019   Component Date Value Ref Range Status    Sed Rate 07/22/2019 42* 0 - 30 mm/Hr Final    WBC 07/22/2019 8.1  4.0 - 11.0 K/uL Final    RBC 07/22/2019 3.73* 4.00 - 5.20 M/uL Final    Hemoglobin 07/22/2019 10.3* 12.0 - 16.0 g/dL Final    Hematocrit 07/22/2019 32.9* 36.0 - 48.0 % Final    MCV 07/22/2019 88.2  80.0 - 100.0 fL Final    MCH 07/22/2019 27.7  26.0 - 34.0 pg Final    MCHC 07/22/2019 31.3  31.0 - 36.0 g/dL Final    RDW 07/22/2019 14.3  12.4 - 15.4 % Final    Platelets 27/31/3955 183  135 - 450 K/uL Final    MPV 07/22/2019 8.4  5.0 - 10.5 fL Final    Neutrophils % 07/22/2019 57.9  % Final    Lymphocytes % 07/22/2019 33.7  % Final    Monocytes % 07/22/2019 7.1  % Final    Eosinophils % 07/22/2019 0.9  % Final    Basophils % 07/22/2019 0.4  % Final    Neutrophils Absolute 07/22/2019 4.7  1.7 - 7.7 K/uL Final    Lymphocytes Absolute 07/22/2019 2.7  1.0 - 5.1 K/uL Final    Monocytes Absolute 07/22/2019 0.6  0.0 - 1.3 K/uL Final    Eosinophils Absolute 07/22/2019 0.1  0.0 - 0.6 K/uL Final    Basophils Absolute 07/22/2019 0.0  0.0 - 0.2 K/uL Final    Sodium 07/22/2019 139  136 - 145 mmol/L Final    Potassium 07/22/2019 4.2  3.5 - 5.1 mmol/L Final    Chloride 07/22/2019 102  99 - 110 mmol/L Final    CO2 07/22/2019 28  21 - 32 mmol/L Final    Anion Gap 07/22/2019 9  3 - 16 Final    Glucose 07/22/2019 164* 70 - 99 mg/dL Final    BUN 07/22/2019 35* 7 - 20 mg/dL Final    CREATININE 07/22/2019 1.3* 0.6 - 1.2 mg/dL Final Check BP at home once or twice a day    Microalbuminuria  -     Blood Pressure Monitor GINA; Check BP at home once or twice a day    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 ([facility]  -     Blood Pressure Monitor GINA; Check BP at home once or twice a day    Uncontrolled type 2 diabetes mellitus with diabetic nephropathy, with long-term current use of insulin (HCC)    Diabetic polyneuropathy associated with type 2 diabetes mellitus ([facility]    Dyslipidemia associated with type 2 diabetes mellitus ([facility]    Morbid obesity due to excess calories ([facility]          1: Type 2 DM complicated with nephropathy, neuropathy TIAs, carotid stenosis   Uncontrolled A1C 8.4%<  6.8% < 10.1%  << 8.1 <<  8% << 11.7%    A1C of <8 would be acceptable because of microvascular and macrovascular complications. Questionable adherence to medical plan, treatment adherence and diet plan   Yolanda Jeter not approved     Need to bring meds     Keep Humalog off     Change Lantus to 24 units daily in am    C/w Amaryl 4 mg with first meal of the day   C/w Jardiance 25 mg daily   C/w Metformin 1000mg bid   C/w Victoza 1.8mg in am     All instructions provided in written. Check Blood sugars 4 times per day. Log them along with insulin and send them every 2 weeks. Call for blood sugars less than 60 or more than 400. Eye exam: Last exam in March 12th 2018, Needs an exam now. Foot exam:  March 2020    Deformity/amputation: absent  Skin lesions/pre-ulcerative calluses: absent  Edema: right- negative, left- negative  Sensory exam: Monofilament sensation: normal  Plus at least one of the following:   Pulses: normal,   Vibration (128 Hz):  Moderately decreased     Renal screen: High 68 Feb 2018, high 47 Jan 2019      TSH screen: Feb 2018   Saw CDE in 2016 with Rd.     2: HTN   High on last visit   She does not check at home     3: Hyperlipidemia   LDL: 45 , HDL: 45 , TGs: 130 Jan 2019     Leg pains present   Continue atorvastatin 20mg daily     RTC in 4

## 2020-05-05 ENCOUNTER — CARE COORDINATION (OUTPATIENT)
Dept: CARE COORDINATION | Age: 60
End: 2020-05-05

## 2020-05-29 ENCOUNTER — VIRTUAL VISIT (OUTPATIENT)
Dept: ENDOCRINOLOGY | Age: 60
End: 2020-05-29
Payer: MEDICAID

## 2020-05-29 PROCEDURE — 99213 OFFICE O/P EST LOW 20 MIN: CPT | Performed by: INTERNAL MEDICINE

## 2020-05-29 NOTE — PROGRESS NOTES
Narrative    Not on file       Past Medical History:   Diagnosis Date    Abnormal brain MRI 7/20/2017    Partially empty sella and minimal chronic small vessel ischemic disease    Acute bilateral low back pain without sciatica 11/2/2016    SHARRON (acute kidney injury) ([facility] 7/5/2017    Arthritis     back    Bipolar disorder ([facility] 10/18/2008    Cancer ([facility] 2015    bilateral breast:s/p lumpectomy/radiation:under care care of breast specialist:Dr. Boone     Carotid stenosis, bilateral:<50%:per US 7/2016 7/15/2016    Carpal tunnel syndrome 10/18/2008    Cervical cancer screening 2014    Nml per pt'.  DDD (degenerative disc disease), lumbar 7/18/2018    Depression     under care of pschiatrist:Dr. 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     Scoliosis     Spondylosis of lumbar region without myelopathy or radiculopathy 3/10/2017    Transient cerebral ischemia 07/15/2016    TIA:7/10/16    Unspecified cerebral artery occlusion with cerebral infarction     TIA       Past Surgical History:   Procedure Laterality Date    BREAST LUMPECTOMY  2015    Bilateral:breast cancer    HYSTERECTOMY      Benign:no cervical cancer per pt'    KIDNEY REMOVAL      right    OTHER SURGICAL HISTORY Right     orif right ankle    TUBAL LIGATION           Allergies   Allergen Reactions    Morphine Anaphylaxis and Hives     feels like throat is closing    Penicillins Hives Final    Blood, Urine 04/22/2020 Negative  Negative Final    pH, UA 04/22/2020 5.5  5.0 - 8.0 Final    Protein, UA 04/22/2020 Negative  Negative mg/dL Final    Urobilinogen, Urine 04/22/2020 0.2  <2.0 E.U./dL Final    Nitrite, Urine 04/22/2020 Negative  Negative Final    Leukocyte Esterase, Urine 04/22/2020 Negative  Negative Final    Microscopic Examination 04/22/2020 Not Indicated   Final    Urine Type 04/22/2020 NotGiven   Final    pH, Ykle 04/22/2020 7.258* 7.350 - 7.450 Final    pCO2, Kyle 04/22/2020 57.7* 40.0 - 50.0 mmHg Final    pO2, Kyle 04/22/2020 36.6  25.0 - 40.0 mmHg Final    HCO3, Venous 04/22/2020 25.2  23.0 - 29.0 mmol/L Final    Base Excess, Kyle 04/22/2020 -2.6  -3.0 - 3.0 mmol/L Final    O2 Sat, [facility] 04/22/2020 64  Not Established % Final    Carboxyhemoglobin 04/22/2020 1.2  0.0 - 1.5 % Final    MetHgb, Kyle 04/22/2020 0.4  <1.5 % Final    TC02 (Calc), Kyle 04/22/2020 27  Not Established mmol/L Final    O2 Content, Kyle 04/22/2020 10  Not Established VOL % Final    O2 Therapy 04/22/2020 Unknown   Final    Troponin 04/22/2020 <0.01  <0.01 ng/mL Final    Pro-BNP 04/22/2020 107  0 - 124 pg/mL Final    Magnesium 04/22/2020 2.00  1.80 - 2.40 mg/dL Final    Phosphorus 04/22/2020 4.5  2.5 - 4.9 mg/dL Final    POC Glucose 04/22/2020 220* 70 - 99 mg/dl Final    Performed on 04/22/2020 ACCU-CHEK   Final    Sodium 04/22/2020 142  136 - 145 mmol/L Final    Potassium 04/22/2020 3.7  3.5 - 5.1 mmol/L Final    Chloride 04/22/2020 108  99 - 110 mmol/L Final    CO2 04/22/2020 23  21 - 32 mmol/L Final    Anion Gap 04/22/2020 11  3 - 16 Final    Glucose 04/22/2020 58* 70 - 99 mg/dL Final    BUN 04/22/2020 21* 7 - 20 mg/dL Final    CREATININE 04/22/2020 1.2  0.6 - 1.2 mg/dL Final    GFR Non- 04/22/2020 46* >60 Final    GFR  04/22/2020 55* >60 Final    Calcium 04/22/2020 8.6  8.3 - 10.6 mg/dL Final    POC Glucose 04/22/2020 57* 70 - 99 mg/dl Final   

## 2020-06-05 ENCOUNTER — APPOINTMENT (OUTPATIENT)
Dept: GENERAL RADIOLOGY | Age: 60
DRG: 175 | End: 2020-06-05
Payer: MEDICAID

## 2020-06-05 ENCOUNTER — APPOINTMENT (OUTPATIENT)
Dept: CT IMAGING | Age: 60
DRG: 175 | End: 2020-06-05
Payer: MEDICAID

## 2020-06-05 ENCOUNTER — HOSPITAL ENCOUNTER (INPATIENT)
Age: 60
LOS: 3 days | Discharge: HOME | DRG: 175 | End: 2020-06-11
Attending: EMERGENCY MEDICINE | Admitting: INTERNAL MEDICINE
Payer: MEDICAID

## 2020-06-05 PROBLEM — R07.9 CHEST PAIN: Status: ACTIVE | Noted: 2020-06-05

## 2020-06-05 LAB
A/G RATIO: 1.3 (ref 1.1–2.2)
ALBUMIN SERPL-MCNC: 3.9 G/DL (ref 3.4–5)
ALP BLD-CCNC: 130 U/L (ref 40–129)
ALT SERPL-CCNC: 32 U/L (ref 10–40)
ANION GAP SERPL CALCULATED.3IONS-SCNC: 11 MMOL/L (ref 3–16)
AST SERPL-CCNC: 24 U/L (ref 15–37)
BASOPHILS ABSOLUTE: 0 K/UL (ref 0–0.2)
BASOPHILS RELATIVE PERCENT: 0.3 %
BILIRUB SERPL-MCNC: <0.2 MG/DL (ref 0–1)
BUN BLDV-MCNC: 22 MG/DL (ref 7–20)
CALCIUM SERPL-MCNC: 9.3 MG/DL (ref 8.3–10.6)
CHLORIDE BLD-SCNC: 106 MMOL/L (ref 99–110)
CO2: 21 MMOL/L (ref 21–32)
CREAT SERPL-MCNC: 1.1 MG/DL (ref 0.6–1.2)
EKG ATRIAL RATE: 79 BPM
EKG DIAGNOSIS: NORMAL
EKG P AXIS: 46 DEGREES
EKG P-R INTERVAL: 148 MS
EKG Q-T INTERVAL: 366 MS
EKG QRS DURATION: 78 MS
EKG QTC CALCULATION (BAZETT): 419 MS
EKG R AXIS: -26 DEGREES
EKG T AXIS: 55 DEGREES
EKG VENTRICULAR RATE: 79 BPM
EOSINOPHILS ABSOLUTE: 0.1 K/UL (ref 0–0.6)
EOSINOPHILS RELATIVE PERCENT: 0.9 %
GFR AFRICAN AMERICAN: >60
GFR NON-AFRICAN AMERICAN: 50
GLOBULIN: 3.1 G/DL
GLUCOSE BLD-MCNC: 255 MG/DL (ref 70–99)
GLUCOSE BLD-MCNC: 285 MG/DL (ref 70–99)
GLUCOSE BLD-MCNC: 56 MG/DL (ref 70–99)
HCT VFR BLD CALC: 31.5 % (ref 36–48)
HEMOGLOBIN: 10 G/DL (ref 12–16)
LIPASE: 6 U/L (ref 13–60)
LYMPHOCYTES ABSOLUTE: 1.3 K/UL (ref 1–5.1)
LYMPHOCYTES RELATIVE PERCENT: 19.4 %
MCH RBC QN AUTO: 28.9 PG (ref 26–34)
MCHC RBC AUTO-ENTMCNC: 31.6 G/DL (ref 31–36)
MCV RBC AUTO: 91.4 FL (ref 80–100)
MONOCYTES ABSOLUTE: 0.3 K/UL (ref 0–1.3)
MONOCYTES RELATIVE PERCENT: 5.1 %
NEUTROPHILS ABSOLUTE: 4.8 K/UL (ref 1.7–7.7)
NEUTROPHILS RELATIVE PERCENT: 74.3 %
PDW BLD-RTO: 13 % (ref 12.4–15.4)
PERFORMED ON: ABNORMAL
PERFORMED ON: ABNORMAL
PLATELET # BLD: 161 K/UL (ref 135–450)
PMV BLD AUTO: 9.4 FL (ref 5–10.5)
POTASSIUM REFLEX MAGNESIUM: 3.6 MMOL/L (ref 3.5–5.1)
RBC # BLD: 3.45 M/UL (ref 4–5.2)
SODIUM BLD-SCNC: 138 MMOL/L (ref 136–145)
TOTAL PROTEIN: 7 G/DL (ref 6.4–8.2)
TROPONIN: <0.01 NG/ML
WBC # BLD: 6.5 K/UL (ref 4–11)

## 2020-06-05 PROCEDURE — 93005 ELECTROCARDIOGRAM TRACING: CPT | Performed by: EMERGENCY MEDICINE

## 2020-06-05 PROCEDURE — 6370000000 HC RX 637 (ALT 250 FOR IP): Performed by: EMERGENCY MEDICINE

## 2020-06-05 PROCEDURE — G0378 HOSPITAL OBSERVATION PER HR: HCPCS

## 2020-06-05 PROCEDURE — 84484 ASSAY OF TROPONIN QUANT: CPT

## 2020-06-05 PROCEDURE — 6370000000 HC RX 637 (ALT 250 FOR IP): Performed by: INTERNAL MEDICINE

## 2020-06-05 PROCEDURE — 99285 EMERGENCY DEPT VISIT HI MDM: CPT

## 2020-06-05 PROCEDURE — 85025 COMPLETE CBC W/AUTO DIFF WBC: CPT

## 2020-06-05 PROCEDURE — 96375 TX/PRO/DX INJ NEW DRUG ADDON: CPT

## 2020-06-05 PROCEDURE — 83690 ASSAY OF LIPASE: CPT

## 2020-06-05 PROCEDURE — 96372 THER/PROPH/DIAG INJ SC/IM: CPT

## 2020-06-05 PROCEDURE — 36415 COLL VENOUS BLD VENIPUNCTURE: CPT

## 2020-06-05 PROCEDURE — 71045 X-RAY EXAM CHEST 1 VIEW: CPT

## 2020-06-05 PROCEDURE — 80053 COMPREHEN METABOLIC PANEL: CPT

## 2020-06-05 PROCEDURE — 6370000000 HC RX 637 (ALT 250 FOR IP): Performed by: NURSE PRACTITIONER

## 2020-06-05 PROCEDURE — 2580000003 HC RX 258: Performed by: INTERNAL MEDICINE

## 2020-06-05 PROCEDURE — 71260 CT THORAX DX C+: CPT

## 2020-06-05 PROCEDURE — 6360000004 HC RX CONTRAST MEDICATION: Performed by: EMERGENCY MEDICINE

## 2020-06-05 PROCEDURE — 93010 ELECTROCARDIOGRAM REPORT: CPT | Performed by: INTERNAL MEDICINE

## 2020-06-05 PROCEDURE — 6360000002 HC RX W HCPCS: Performed by: INTERNAL MEDICINE

## 2020-06-05 PROCEDURE — 99254 IP/OBS CNSLTJ NEW/EST MOD 60: CPT | Performed by: INTERNAL MEDICINE

## 2020-06-05 RX ORDER — POLYETHYLENE GLYCOL 3350 17 G/17G
17 POWDER, FOR SOLUTION ORAL DAILY PRN
Status: DISCONTINUED | OUTPATIENT
Start: 2020-06-05 | End: 2020-06-11 | Stop reason: HOSPADM

## 2020-06-05 RX ORDER — TRAZODONE HYDROCHLORIDE 50 MG/1
100 TABLET ORAL NIGHTLY
Status: DISCONTINUED | OUTPATIENT
Start: 2020-06-06 | End: 2020-06-11 | Stop reason: HOSPADM

## 2020-06-05 RX ORDER — ATORVASTATIN CALCIUM 40 MG/1
40 TABLET, FILM COATED ORAL NIGHTLY
Status: DISCONTINUED | OUTPATIENT
Start: 2020-06-05 | End: 2020-06-11 | Stop reason: HOSPADM

## 2020-06-05 RX ORDER — DEXTROSE MONOHYDRATE 25 G/50ML
12.5 INJECTION, SOLUTION INTRAVENOUS PRN
Status: DISCONTINUED | OUTPATIENT
Start: 2020-06-05 | End: 2020-06-11 | Stop reason: HOSPADM

## 2020-06-05 RX ORDER — SODIUM CHLORIDE 0.9 % (FLUSH) 0.9 %
10 SYRINGE (ML) INJECTION PRN
Status: DISCONTINUED | OUTPATIENT
Start: 2020-06-05 | End: 2020-06-11 | Stop reason: HOSPADM

## 2020-06-05 RX ORDER — ASPIRIN 81 MG/1
81 TABLET, CHEWABLE ORAL DAILY
Status: DISCONTINUED | OUTPATIENT
Start: 2020-06-06 | End: 2020-06-11 | Stop reason: HOSPADM

## 2020-06-05 RX ORDER — ONDANSETRON 2 MG/ML
4 INJECTION INTRAMUSCULAR; INTRAVENOUS EVERY 6 HOURS PRN
Status: DISCONTINUED | OUTPATIENT
Start: 2020-06-05 | End: 2020-06-11 | Stop reason: HOSPADM

## 2020-06-05 RX ORDER — DEXTROSE MONOHYDRATE 50 MG/ML
100 INJECTION, SOLUTION INTRAVENOUS PRN
Status: DISCONTINUED | OUTPATIENT
Start: 2020-06-05 | End: 2020-06-11 | Stop reason: HOSPADM

## 2020-06-05 RX ORDER — PROMETHAZINE HYDROCHLORIDE 25 MG/1
12.5 TABLET ORAL EVERY 6 HOURS PRN
Status: DISCONTINUED | OUTPATIENT
Start: 2020-06-05 | End: 2020-06-11 | Stop reason: HOSPADM

## 2020-06-05 RX ORDER — OXYCODONE HYDROCHLORIDE AND ACETAMINOPHEN 5; 325 MG/1; MG/1
1 TABLET ORAL EVERY 6 HOURS PRN
Status: DISCONTINUED | OUTPATIENT
Start: 2020-06-05 | End: 2020-06-11 | Stop reason: HOSPADM

## 2020-06-05 RX ORDER — ACETAMINOPHEN 325 MG/1
650 TABLET ORAL EVERY 6 HOURS PRN
Status: DISCONTINUED | OUTPATIENT
Start: 2020-06-05 | End: 2020-06-11 | Stop reason: HOSPADM

## 2020-06-05 RX ORDER — NITROGLYCERIN 0.4 MG/1
0.4 TABLET SUBLINGUAL ONCE
Status: COMPLETED | OUTPATIENT
Start: 2020-06-05 | End: 2020-06-05

## 2020-06-05 RX ORDER — ACETAMINOPHEN 650 MG/1
650 SUPPOSITORY RECTAL EVERY 6 HOURS PRN
Status: DISCONTINUED | OUTPATIENT
Start: 2020-06-05 | End: 2020-06-11 | Stop reason: HOSPADM

## 2020-06-05 RX ORDER — SODIUM CHLORIDE 0.9 % (FLUSH) 0.9 %
10 SYRINGE (ML) INJECTION EVERY 12 HOURS SCHEDULED
Status: DISCONTINUED | OUTPATIENT
Start: 2020-06-05 | End: 2020-06-11 | Stop reason: HOSPADM

## 2020-06-05 RX ORDER — NICOTINE POLACRILEX 4 MG
15 LOZENGE BUCCAL PRN
Status: DISCONTINUED | OUTPATIENT
Start: 2020-06-05 | End: 2020-06-11 | Stop reason: HOSPADM

## 2020-06-05 RX ADMIN — NITROGLYCERIN 0.4 MG: 0.4 TABLET, ORALLY DISINTEGRATING SUBLINGUAL at 13:04

## 2020-06-05 RX ADMIN — NITROGLYCERIN 0.5 INCH: 20 OINTMENT TOPICAL at 15:00

## 2020-06-05 RX ADMIN — INSULIN LISPRO 2 UNITS: 100 INJECTION, SOLUTION INTRAVENOUS; SUBCUTANEOUS at 22:09

## 2020-06-05 RX ADMIN — ATORVASTATIN CALCIUM 40 MG: 40 TABLET, FILM COATED ORAL at 22:09

## 2020-06-05 RX ADMIN — OXYCODONE HYDROCHLORIDE AND ACETAMINOPHEN 1 TABLET: 5; 325 TABLET ORAL at 18:26

## 2020-06-05 RX ADMIN — Medication 10 ML: at 22:10

## 2020-06-05 RX ADMIN — IOPAMIDOL 75 ML: 755 INJECTION, SOLUTION INTRAVENOUS at 13:28

## 2020-06-05 RX ADMIN — ENOXAPARIN SODIUM 40 MG: 40 INJECTION SUBCUTANEOUS at 22:10

## 2020-06-05 ASSESSMENT — PAIN DESCRIPTION - LOCATION
LOCATION: CHEST

## 2020-06-05 ASSESSMENT — PAIN DESCRIPTION - FREQUENCY
FREQUENCY: INTERMITTENT
FREQUENCY: INTERMITTENT

## 2020-06-05 ASSESSMENT — PAIN DESCRIPTION - ONSET
ONSET: ON-GOING
ONSET: ON-GOING

## 2020-06-05 ASSESSMENT — PAIN SCALES - GENERAL
PAINLEVEL_OUTOF10: 6
PAINLEVEL_OUTOF10: 7
PAINLEVEL_OUTOF10: 7
PAINLEVEL_OUTOF10: 0

## 2020-06-05 ASSESSMENT — PAIN DESCRIPTION - PAIN TYPE
TYPE: ACUTE PAIN
TYPE: ACUTE PAIN

## 2020-06-05 ASSESSMENT — ENCOUNTER SYMPTOMS
NAUSEA: 0
VOMITING: 0
COUGH: 0
ABDOMINAL PAIN: 0
SORE THROAT: 0
COLOR CHANGE: 0
SHORTNESS OF BREATH: 0

## 2020-06-05 ASSESSMENT — PAIN DESCRIPTION - PROGRESSION: CLINICAL_PROGRESSION: GRADUALLY WORSENING

## 2020-06-05 ASSESSMENT — PAIN DESCRIPTION - DESCRIPTORS
DESCRIPTORS: PRESSURE
DESCRIPTORS: ACHING;BURNING
DESCRIPTORS: PRESSURE

## 2020-06-05 ASSESSMENT — PAIN DESCRIPTION - ORIENTATION
ORIENTATION: MID
ORIENTATION: MID

## 2020-06-05 NOTE — H&P
needed. Brett Jain Historical Provider, MD   oxyCODONE-acetaminophen (PERCOCET) 5-325 MG per tablet Take 1 tablet by mouth every 6 hours as needed for Pain. Brett Jain Historical Provider, MD   traZODone (DESYREL) 100 MG tablet Take 100 mg by mouth nightly    Historical Provider, MD       Allergies:  Morphine; Penicillins; Codeine; and Penicillin g    Social History:    The patient currently lives at home and is independent of IADLs. TOBACCO:   reports that she quit smoking about 5 years ago. Her smoking use included cigarettes and cigars. She has a 10.00 pack-year smoking history. She has never used smokeless tobacco.  ETOH:   reports no history of alcohol use. Family History:     Reviewed in detail and negative for DM, CAD, Cancer, CVA. Positive as follows:        Problem Relation Age of Onset    Cancer Mother         breast    Cancer Father     Heart Failure Neg Hx     High Cholesterol Neg Hx     Hypertension Neg Hx     Migraines Neg Hx     Rashes/Skin Problems Neg Hx     Seizures Neg Hx     Stroke Neg Hx     Thyroid Disease Neg Hx     Diabetes Neg Hx        REVIEW OF SYSTEMS:   Pertinent positives as noted in the HPI. All other systems reviewed and negative. PHYSICAL EXAM PERFORMED:  BP (!) 134/47   Pulse 62   Temp 98.9 °F (37.2 °C) (Oral)   Resp 17   Ht 5' 3\" (1.6 m)   Wt 153 lb 4.8 oz (69.5 kg)   SpO2 98%   BMI 27.16 kg/m²     General appearance:  No apparent distress, appears stated age and cooperative. HEENT:  Normal cephalic, atraumatic without obvious deformity. Pupils equal, round, and reactive to light. Extra ocular muscles intact. Conjunctivae/corneas clear. Neck: Supple, with full range of motion. No jugular venous distention. Trachea midline. Respiratory:  Normal respiratory effort. Clear to auscultation, bilaterally without Rales/Wheezes/Rhonchi. Cardiovascular:  Regular rate and rhythm with normal S1/S2 without murmurs, rubs or gallops.   Abdomen: Soft, non-tender, non-distended

## 2020-06-05 NOTE — PROGRESS NOTES
Secure message to Bridget Matos NP    \"Can I get insulin and diabetic orders placed for this pt? Her last BS was 55 and she does have diabetes. Thank you. \"

## 2020-06-05 NOTE — CONSULTS
[facility]  [phone]      Attending Physician: Aiden Mar MD  Reason for Consultation/Chief Complaint: Chest pain    Subjective   History of Present Illness:  Ximena Barber is a 63 y.o. patient who presented to the hospital with complaints of chest pain that has been going on for the last 1 to 2 days. She says it is substernal and sharp in nature. She says movement does seem to make it worse. During the evaluation examination, she also did note that deep inspiration seems to make it worse. She denied associated shortness of breath or PND orthopnea lower extreme edema. She had palpitations, dizziness or loss of consciousness. She will emergency room with these complaints and received nitroglycerin which seemed to offer some relief of her symptoms. She had troponin levels drawn twice which were negative. Work-up in the emergency room also included a CT scan of the chest which did not show any evidence of PE. She gets her usual cardiac care through [facility], her cardiologist there is Dr. Trevor Casanova. Her cardiac history dates back to 2010, she was evaluated in the hospital for syncope, she had an echocardiogram which showed mild diffuse hypokinesis with ejection fraction of 45 to 50%. At that time, plans were made for outpatient follow-up with heart monitor. She has continued to follow-up with doctors at [facility], her last visit being on September 13, 2019, at that time she was felt to be stable with regard to chronic conditions which included hypertension, cardiomyopathy, history of syncope and TIA. Patient chronically also states she has diabetes, follows with endocrinology, she says her blood sugar is not well controlled, she states she there have been medicine changes recently made for diabetes.     Past Medical History:   has a past medical history of Abnormal brain MRI, Acute bilateral low back pain without sciatica, SHARRON (acute kidney injury) ([facility] [address]

## 2020-06-05 NOTE — ED PROVIDER NOTES
Emergency Department Provider Note  Location: [address] [place] [address]  ED  6/5/2020     Patient Identification  Rachael Goodell is a 63 y.o. female    Chief Complaint  Chest Pain (pt c/o chest pain and abdominal pain that started \"this morning\")      Mode of Arrival  EMS    HPI  (History provided by patient)  This is a 63 y.o. female with a PMH significant for DM, HTN, HLD, breast cancer but in remission presented today for chest pain. Patient states she was using the bathroom, having a bowel movement this morning when she developed chest pain. She described as a \"sharp pressure\" and slightly pleuritic. She thought maybe she had gas. However, after an hour, she was still having significant pain so she called EMS. EMS gave her 4 baby aspirin and sublingual nitro. She states that eased the pain significantly and by the time she got here, she had minimal pain. She denies nausea or vomiting. No neck pain. No lightheadedness. No diaphoresis. She also denies any recent URI symptoms. ROS  Review of Systems   Constitutional: Negative for chills, diaphoresis and fever. HENT: Negative for congestion and sore throat. Eyes: Negative for visual disturbance. Respiratory: Negative for cough and shortness of breath. Cardiovascular: Positive for chest pain. Negative for palpitations. Gastrointestinal: Negative for abdominal pain, nausea and vomiting. Genitourinary: Negative for dysuria and frequency. Musculoskeletal: Negative for neck pain. Skin: Negative for color change and rash. Neurological: Negative for syncope and light-headedness. I have reviewed the following nursing documentation:  Allergies:    Allergies   Allergen Reactions    Morphine Anaphylaxis and Hives     feels like throat is closing    Penicillins Hives and Swelling    Codeine Hives and Rash    Penicillin G Rash       Past medical history:  has a past medical history of Abnormal brain MRI (7/20/2017), Acute bilateral low JARDIANCE 25 MG tablet  1/22/20   Historical Provider, MD   Insulin Syringe-Needle U-100 31G X 5/16\" 0.3 ML MISC USE 3 TIMES A DAY BEFORE MEALS 1/31/20   Naz Paiz MD   TRUE METRIX BLOOD GLUCOSE TEST strip USE AS DIRECTED TO TEST 4 TIMES A DAY 1/27/20   Naz Paiz MD   calcium carbonate-vitamin D (CALTRATE) 600-400 MG-UNIT TABS per tab  12/30/19   Imani Provider, MD   atorvastatin (LIPITOR) 20 MG tablet Take 1 tablet by mouth daily 12/20/19   Naz Paiz MD   letrozole [facility]) 2.5 MG tablet TAKE 1 TABLET BY MOUTH EVERY DAY 2/1/19   Imani Provider, MD   glucagon 1 MG injection Inject 1 mg into the muscle See Admin Instructions Follow package directions for low blood sugar. 2/19/19   Naz Paiz MD   Insulin Pen Needle (UNIFINE PENTIPS) 32G X 4 MM MISC 6 times daily for victoza and insulins. 11/28/18   Naz Paiz MD   clonazePAM (KLONOPIN) 1 MG tablet Take 1 mg by mouth as needed. Vasquez Sharma Historical Provider, MD   metFORMIN (GLUCOPHAGE) 1000 MG tablet TAKE 1 TABLET WITH MEALS TWICE A DAY 3/12/18   Paula Estrada MD   paliperidone (INVEGA) 9 MG extended release tablet Take 9 mg by mouth every morning  1/22/18   Historical Provider, MD   aspirin 81 MG tablet Take 81 mg by mouth daily    Historical Provider, MD   gabapentin (NEURONTIN) 600 MG tablet Take 600 mg by mouth 3 times daily    Historical Provider, MD   oxyCODONE-acetaminophen (PERCOCET) 5-325 MG per tablet Take 1 tablet by mouth every 6 hours as needed for Pain. Vasquez Sharma Historical Provider, MD   traZODone (DESYREL) 100 MG tablet Take 100 mg by mouth nightly    Historical Provider, MD       Social history:  reports that she quit smoking about 5 years ago. Her smoking use included cigarettes and cigars. She has a 10.00 pack-year smoking history. She has never used smokeless tobacco. She reports that she does not drink alcohol or use drugs.     Family history:    Family History   Problem Hemoglobin 10.0 (L) 12.0 - 16.0 g/dL    Hematocrit 31.5 (L) 36.0 - 48.0 %    MCV 91.4 80.0 - 100.0 fL    MCH 28.9 26.0 - 34.0 pg    MCHC 31.6 31.0 - 36.0 g/dL    RDW 13.0 12.4 - 15.4 %    Platelets 727 797 - 348 K/uL    MPV 9.4 5.0 - 10.5 fL    Neutrophils % 74.3 %    Lymphocytes % 19.4 %    Monocytes % 5.1 %    Eosinophils % 0.9 %    Basophils % 0.3 %    Neutrophils Absolute 4.8 1.7 - 7.7 K/uL    Lymphocytes Absolute 1.3 1.0 - 5.1 K/uL    Monocytes Absolute 0.3 0.0 - 1.3 K/uL    Eosinophils Absolute 0.1 0.0 - 0.6 K/uL    Basophils Absolute 0.0 0.0 - 0.2 K/uL   Comprehensive Metabolic Panel w/ Reflex to MG   Result Value Ref Range    Sodium 138 136 - 145 mmol/L    Potassium reflex Magnesium 3.6 3.5 - 5.1 mmol/L    Chloride 106 99 - 110 mmol/L    CO2 21 21 - 32 mmol/L    Anion Gap 11 3 - 16    Glucose 255 (H) 70 - 99 mg/dL    BUN 22 (H) 7 - 20 mg/dL    CREATININE 1.1 0.6 - 1.2 mg/dL    GFR Non-African American 50 (A) >60    GFR African American >60 >60    Calcium 9.3 8.3 - 10.6 mg/dL    Total Protein 7.0 6.4 - 8.2 g/dL    Alb 3.9 3.4 - 5.0 g/dL    Albumin/Globulin Ratio 1.3 1.1 - 2.2    Total Bilirubin <0.2 0.0 - 1.0 mg/dL    Alkaline Phosphatase 130 (H) 40 - 129 U/L    ALT 32 10 - 40 U/L    AST 24 15 - 37 U/L    Globulin 3.1 g/dL   Troponin   Result Value Ref Range    Troponin <0.01 <0.01 ng/mL   Lipase   Result Value Ref Range    Lipase 6.0 (L) 13.0 - 60.0 U/L   Troponin   Result Value Ref Range    Troponin <0.01 <0.01 ng/mL   POCT Glucose   Result Value Ref Range    POC Glucose 56 (L) 70 - 99 mg/dl    Performed on ACCU-CHEK        HEART SCORE:  History: +1 for moderate suspicion  EKG: +0 for normal EKG   Age: +1 for age 44-72 years  Risk factors (includes HLD, HTN, DM, tobacco use, obesity, and +FHx): +2 for known CAD or 3+ risk factors  Initial troponin: +0 for negative troponin    Heart score: 4.   This falls under the following category: Score of 4-6, which indicates low/moderate risk for major adverse cardiac Disposition:  Admit to telemetry in stable condition. Blood pressure (!) 122/95, pulse 62, temperature 98.4 °F (36.9 °C), resp. rate 14, height 5' 3\" (1.6 m), weight 163 lb (73.9 kg), SpO2 100 %, not currently breastfeeding. Disposition referral (if applicable):  Critical access hospital [facility]  [address] [address]  [phone]        This chart was generated in part by using Dragon Dictation system and may contain errors related to that system including errors in grammar, punctuation, and spelling, as well as words and phrases that may be inappropriate. If there are any questions or concerns please feel free to contact the dictating provider for clarification.      Tanya Waters MD  [address] [place] [address] Zhanna Chavis MD  06/05/20 9573

## 2020-06-05 NOTE — PROGRESS NOTES
4 Eyes Skin Assessment     The patient is being assess for  Admission    I agree that 2 RN's have performed a thorough Head to Toe Skin Assessment on the patient. ALL assessment sites listed below have been assessed. Areas assessed by both nurses: yes  []   Head, Face, and Ears   []   Shoulders, Back, and Chest  []   Arms, Elbows, and Hands   []   Coccyx, Sacrum, and Ischum  []   Legs, Feet, and Heels        Does the Patient have Skin Breakdown?   No         Maco Prevention initiated:  No   Wound Care Orders initiated:  No      [facility] nurse consulted for Pressure Injury (Stage 3,4, Unstageable, DTI, NWPT, and Complex wounds):  No      Nurse 1 eSignature: Electronically signed by Pamela Carver RN on 6/5/20 at 7:14 PM EDT    **SHARE this note so that the co-signing nurse is able to place an eSignature**    Nurse 2 eSignature: Electronically signed by Amalia Boas, RN on 6/5/20 at 7:22 PM EDT

## 2020-06-05 NOTE — ED NOTES
Called Cardiology @ 150  Re: chest pain  Dr Anthony Alonzo responded @ [address]  06/05/20 3145

## 2020-06-06 ENCOUNTER — APPOINTMENT (OUTPATIENT)
Dept: NUCLEAR MEDICINE | Age: 60
DRG: 175 | End: 2020-06-06
Payer: MEDICAID

## 2020-06-06 LAB
EKG ATRIAL RATE: 62 BPM
EKG DIAGNOSIS: NORMAL
EKG P AXIS: 50 DEGREES
EKG P-R INTERVAL: 164 MS
EKG Q-T INTERVAL: 418 MS
EKG QRS DURATION: 70 MS
EKG QTC CALCULATION (BAZETT): 424 MS
EKG R AXIS: -22 DEGREES
EKG T AXIS: 26 DEGREES
EKG VENTRICULAR RATE: 62 BPM
GLUCOSE BLD-MCNC: 103 MG/DL (ref 70–99)
GLUCOSE BLD-MCNC: 140 MG/DL (ref 70–99)
GLUCOSE BLD-MCNC: 299 MG/DL (ref 70–99)
GLUCOSE BLD-MCNC: 330 MG/DL (ref 70–99)
HCT VFR BLD CALC: 29.2 % (ref 36–48)
HEMOGLOBIN: 9.3 G/DL (ref 12–16)
LV EF: 55 %
LV EF: 70 %
LVEF MODALITY: NORMAL
LVEF MODALITY: NORMAL
MCH RBC QN AUTO: 28.8 PG (ref 26–34)
MCHC RBC AUTO-ENTMCNC: 31.9 G/DL (ref 31–36)
MCV RBC AUTO: 90.1 FL (ref 80–100)
PDW BLD-RTO: 13 % (ref 12.4–15.4)
PERFORMED ON: ABNORMAL
PLATELET # BLD: 152 K/UL (ref 135–450)
PMV BLD AUTO: 10.5 FL (ref 5–10.5)
RBC # BLD: 3.24 M/UL (ref 4–5.2)
WBC # BLD: 5.4 K/UL (ref 4–11)

## 2020-06-06 PROCEDURE — 78452 HT MUSCLE IMAGE SPECT MULT: CPT

## 2020-06-06 PROCEDURE — 93010 ELECTROCARDIOGRAM REPORT: CPT | Performed by: INTERNAL MEDICINE

## 2020-06-06 PROCEDURE — 93306 TTE W/DOPPLER COMPLETE: CPT

## 2020-06-06 PROCEDURE — 80048 BASIC METABOLIC PNL TOTAL CA: CPT

## 2020-06-06 PROCEDURE — 6370000000 HC RX 637 (ALT 250 FOR IP): Performed by: INTERNAL MEDICINE

## 2020-06-06 PROCEDURE — 36415 COLL VENOUS BLD VENIPUNCTURE: CPT

## 2020-06-06 PROCEDURE — 93017 CV STRESS TEST TRACING ONLY: CPT

## 2020-06-06 PROCEDURE — G0378 HOSPITAL OBSERVATION PER HR: HCPCS

## 2020-06-06 PROCEDURE — 6360000002 HC RX W HCPCS: Performed by: INTERNAL MEDICINE

## 2020-06-06 PROCEDURE — A9502 TC99M TETROFOSMIN: HCPCS | Performed by: INTERNAL MEDICINE

## 2020-06-06 PROCEDURE — 85027 COMPLETE CBC AUTOMATED: CPT

## 2020-06-06 PROCEDURE — 6370000000 HC RX 637 (ALT 250 FOR IP): Performed by: NURSE PRACTITIONER

## 2020-06-06 PROCEDURE — 2580000003 HC RX 258: Performed by: INTERNAL MEDICINE

## 2020-06-06 PROCEDURE — 99225 PR SBSQ OBSERVATION CARE/DAY 25 MINUTES: CPT | Performed by: NURSE PRACTITIONER

## 2020-06-06 PROCEDURE — 96374 THER/PROPH/DIAG INJ IV PUSH: CPT

## 2020-06-06 PROCEDURE — 96372 THER/PROPH/DIAG INJ SC/IM: CPT

## 2020-06-06 PROCEDURE — 3430000000 HC RX DIAGNOSTIC RADIOPHARMACEUTICAL: Performed by: INTERNAL MEDICINE

## 2020-06-06 PROCEDURE — 6360000002 HC RX W HCPCS: Performed by: FAMILY MEDICINE

## 2020-06-06 RX ORDER — KETOROLAC TROMETHAMINE 30 MG/ML
15 INJECTION, SOLUTION INTRAMUSCULAR; INTRAVENOUS ONCE
Status: COMPLETED | OUTPATIENT
Start: 2020-06-06 | End: 2020-06-06

## 2020-06-06 RX ORDER — LISINOPRIL 2.5 MG/1
TABLET ORAL
Qty: 30 TABLET | Refills: 1 | Status: SHIPPED | OUTPATIENT
Start: 2020-06-06 | End: 2020-06-09 | Stop reason: HOSPADM

## 2020-06-06 RX ADMIN — INSULIN LISPRO 4 UNITS: 100 INJECTION, SOLUTION INTRAVENOUS; SUBCUTANEOUS at 17:33

## 2020-06-06 RX ADMIN — Medication 10 ML: at 10:50

## 2020-06-06 RX ADMIN — ASPIRIN 81 MG 81 MG: 81 TABLET ORAL at 10:49

## 2020-06-06 RX ADMIN — KETOROLAC TROMETHAMINE 15 MG: 30 INJECTION, SOLUTION INTRAMUSCULAR at 18:50

## 2020-06-06 RX ADMIN — INSULIN LISPRO 2 UNITS: 100 INJECTION, SOLUTION INTRAVENOUS; SUBCUTANEOUS at 20:30

## 2020-06-06 RX ADMIN — TETROFOSMIN 30.3 MILLICURIE: 1.38 INJECTION, POWDER, LYOPHILIZED, FOR SOLUTION INTRAVENOUS at 09:35

## 2020-06-06 RX ADMIN — METOPROLOL TARTRATE 12.5 MG: 25 TABLET, FILM COATED ORAL at 17:20

## 2020-06-06 RX ADMIN — REGADENOSON 0.4 MG: 0.08 INJECTION, SOLUTION INTRAVENOUS at 09:35

## 2020-06-06 RX ADMIN — TETROFOSMIN 10.4 MILLICURIE: 1.38 INJECTION, POWDER, LYOPHILIZED, FOR SOLUTION INTRAVENOUS at 08:20

## 2020-06-06 RX ADMIN — OXYCODONE HYDROCHLORIDE AND ACETAMINOPHEN 1 TABLET: 5; 325 TABLET ORAL at 23:37

## 2020-06-06 RX ADMIN — OXYCODONE HYDROCHLORIDE AND ACETAMINOPHEN 1 TABLET: 5; 325 TABLET ORAL at 10:49

## 2020-06-06 RX ADMIN — Medication 10 ML: at 20:31

## 2020-06-06 RX ADMIN — OXYCODONE HYDROCHLORIDE AND ACETAMINOPHEN 1 TABLET: 5; 325 TABLET ORAL at 17:22

## 2020-06-06 RX ADMIN — ENOXAPARIN SODIUM 40 MG: 40 INJECTION SUBCUTANEOUS at 10:50

## 2020-06-06 RX ADMIN — TRAZODONE HYDROCHLORIDE 100 MG: 50 TABLET ORAL at 20:31

## 2020-06-06 RX ADMIN — ATORVASTATIN CALCIUM 40 MG: 40 TABLET, FILM COATED ORAL at 20:31

## 2020-06-06 RX ADMIN — TRAZODONE HYDROCHLORIDE 100 MG: 50 TABLET ORAL at 00:08

## 2020-06-06 ASSESSMENT — PAIN SCALES - GENERAL
PAINLEVEL_OUTOF10: 6
PAINLEVEL_OUTOF10: 3
PAINLEVEL_OUTOF10: 6
PAINLEVEL_OUTOF10: 6
PAINLEVEL_OUTOF10: 4
PAINLEVEL_OUTOF10: 4
PAINLEVEL_OUTOF10: 6
PAINLEVEL_OUTOF10: 0
PAINLEVEL_OUTOF10: 6

## 2020-06-06 ASSESSMENT — PAIN DESCRIPTION - LOCATION
LOCATION: CHEST
LOCATION: CHEST

## 2020-06-06 ASSESSMENT — PAIN DESCRIPTION - PAIN TYPE
TYPE: ACUTE PAIN
TYPE: ACUTE PAIN

## 2020-06-06 NOTE — PROGRESS NOTES
Nutrition Assessment    Type and Reason for Visit: Positive Nutrition Screen, Initial(diet education, wt loss )    Nutrition Recommendations:   Continue carb controlled, cardiac diet   Monitor nutrition adequacy, pertinent labs, bowel habits, wt changes, and clinical progress    Nutrition Assessment: Nutritionally compromised AEB pt report of unplanned wt loss. At risk for decline 2/2 uncontrolled DM2. Pt reports labile BG at baseline. She has recieved carb control diet counseling in the past and familiar with need to consume consistent carbs 45-60 gm/meal and also understands importance of timing of/between meals. RD reinforced education with pt. She has also been losing wt without trying. Encouraged adequate calorie and protein sources at meal times. ONS declined at this time. WIll continue to monitor. Malnutrition Assessment:  · Malnutrition Status: No malnutrition    Nutrition Risk Level: Moderate    Nutrient Needs:  · Estimated Daily Total Kcal: 5527-8728  · Estimated Daily Protein (g): 68-82 g   · Estimated Daily Total Fluid (ml/day): 1 mL/kcal     Nutrition Diagnosis:   · Problem: Unintended weight loss  · Etiology: related to Endocrine dysfunction     Signs and symptoms:  as evidenced by (DM2, labile BG history per pt )    Objective Information:  · Nutrition-Focused Physical Findings: No edema per flowsheets.  Pt reporting wt loss   · Wound Type: None  · Current Nutrition Therapies:  · Oral Diet Orders: Carb Control 4 Carbs/Meal, Cardiac   · Oral Diet intake: %  · Oral Nutrition Supplement (ONS) Orders: None  · Anthropometric Measures:  · Ht: 5' 3\" (160 cm)   · Current Body Wt: 151 lb (68.5 kg)  · Usual Body Wt: 181 lb (82.1 kg)(in December )  · % Weight Change:  ,  -30 lb wt loss since December (-16.6% loss x 6 months)  · BMI Classification: BMI 25.0 - 29.9 Overweight    Nutrition Interventions:   Continue current diet  Education Completed, Continued Inpatient Monitoring    Nutrition Evaluation:   · Evaluation: Goals set   · Goals: Tolerate diet and consume greater than 50% of meals this admission with controlled BG     · Monitoring: Nutrition Progression, Meal Intake, Supplement Intake, Diet Tolerance, Pertinent Labs, Weight      Electronically signed by Marilynn Lopez.  Axel Beth RD, LD on 6/6/20 at 1:35 PM EDT    Contact Number: [phone]

## 2020-06-06 NOTE — PROGRESS NOTES
[address]  Cardiology  Progress Note    Admission date:  2020    Reason for follow up visit: chest pain     HPI/CC: Sam Ventura is a 63 y.o. female who presented to the hospital with complaints of chest pain. Note deep inspiration makes chest pain worse. CT without PE. Troponin <0.01 x4. Stress test and echocardiogram ordered. Telemetry overnight has been NSR 60-70. Subjective: Resting in bed. Stated she had some chest pain during stress test otherwise resolved. Denies shortness of breath, palpitations and dizziness. Vitals:  Blood pressure 113/72, pulse 62, temperature 97.8 °F (36.6 °C), temperature source Axillary, resp. rate 18, height 5' 3\" (1.6 m), weight 151 lb 4.8 oz (68.6 kg), SpO2 99 %, not currently breastfeeding.   Temp  Av.6 °F (37 °C)  Min: 98.3 °F (36.8 °C)  Max: 98.9 °F (37.2 °C)  Pulse  Av.9  Min: 57  Max: 80  BP  Min: 100/46  Max: 134/47  SpO2  Av.3 %  Min: 96 %  Max: 100 %    24 hour I/O    Intake/Output Summary (Last 24 hours) at 2020 4896  Last data filed at 2020 0407  Gross per 24 hour   Intake 240 ml   Output 600 ml   Net -360 ml     Current Facility-Administered Medications   Medication Dose Route Frequency Provider Last Rate Last Dose    perflutren lipid microspheres (DEFINITY) injection 1.65 mg  1.5 mL Intravenous ONCE PRN Fang Freeman MD        sodium chloride flush 0.9 % injection 10 mL  10 mL Intravenous 2 times per day Ellender Bamberger, MD   10 mL at 20 2210    sodium chloride flush 0.9 % injection 10 mL  10 mL Intravenous PRN Ellender Bamberger, MD        acetaminophen (TYLENOL) tablet 650 mg  650 mg Oral Q6H PRN Ellender Bamberger, MD        Or    acetaminophen (TYLENOL) suppository 650 mg  650 mg Rectal Q6H PRN Ellender Bamberger, MD        polyethylene glycol (GLYCOLAX) packet 17 g  17 g Oral Daily PRN Ellender Bamberger, MD        promethazine (PHENERGAN) tablet 12.5 mg  12.5 mg Oral Q6H PRN Jett Damon

## 2020-06-06 NOTE — PLAN OF CARE
Problem: Nutrition  Goal: Optimal nutrition therapy  Outcome: Ongoing  Note: Nutrition Problem: Unintended weight loss  Intervention: Food and/or Nutrient Delivery: Continue current diet  Nutritional Goals:  Tolerate diet and consume greater than 50% of meals this admission with controlled BG

## 2020-06-06 NOTE — PLAN OF CARE
Patient's EF (Ejection Fraction) is greater than 40%    Heart Failure Medications:  Diuretics[de-identified] None    (One of the following REQUIRED for EF <40%/SYSTOLIC FAILURE but MAY be used in EF% >40%/DIASTOLIC FAILURE)        ACE[de-identified] None        ARB[de-identified] None         ARNI[de-identified] None    (Beta Blockers)  NON- Evidenced Based Beta Blocker (for EF% >40%/DIASTOLIC FAILURE): None    Evidenced Based Beta Blocker::(REQUIRED for EF% <40%/SYSTOLIC FAILURE) None  . .................................................................................................................................................. Patient's weights and intake/output reviewed: Yes    Patient's Last Weight: 153 lbs obtained by standing scale. Difference of 0 lbs  than last documented weight. Intake/Output Summary (Last 24 hours) at 6/6/2020 0357  Last data filed at 6/5/2020 1834  Gross per 24 hour   Intake 240 ml   Output 0 ml   Net 240 ml       Comorbidities Reviewed Yes    Patient has a past medical history of Abnormal brain MRI, Acute bilateral low back pain without sciatica, SHARRON (acute kidney injury) ([facility] [address] Arthritis, Bipolar disorder ([facility] [address] Cancer ([facility] [address] Carotid stenosis, bilateral:<50%:per US 7/2016, Carpal tunnel syndrome, Cervical cancer screening, DDD (degenerative disc disease), lumbar, Depression, Depression/anxiety, Depression/anxiety, Diabetes mellitus ([address] Gout, History of mammogram, Hyperlipidemia, Hypertension, 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      >>For CHF and Comorbidity documentation on Education Time and Topics, please see Education Tab    Progressive Mobility Assessment:  What is this patient's Current Level of Mobility?: Ambulatory-Up [place]  How was this patient Mobilized today?: Edge of Bed, Up to Chair, and Up in Room                 With Whom? Self                 Level of Difficulty/Assistance: Independent     Pt resting in bed at this time on room air. Pt denies shortness of breath. Pt without lower extremity edema.      Patient and/or Family's stated Goal of Care this Admission: increase activity tolerance and be more comfortable prior to discharge          Problem: HEMODYNAMIC STATUS  Goal: Patient has stable vital signs and fluid balance  Outcome: Ongoing

## 2020-06-07 LAB
ANION GAP SERPL CALCULATED.3IONS-SCNC: 11 MMOL/L (ref 3–16)
BUN BLDV-MCNC: 21 MG/DL (ref 7–20)
CALCIUM SERPL-MCNC: 9.1 MG/DL (ref 8.3–10.6)
CHLORIDE BLD-SCNC: 110 MMOL/L (ref 99–110)
CO2: 21 MMOL/L (ref 21–32)
CREAT SERPL-MCNC: 1 MG/DL (ref 0.6–1.2)
GFR AFRICAN AMERICAN: >60
GFR NON-AFRICAN AMERICAN: 56
GLUCOSE BLD-MCNC: 256 MG/DL (ref 70–99)
GLUCOSE BLD-MCNC: 270 MG/DL (ref 70–99)
GLUCOSE BLD-MCNC: 323 MG/DL (ref 70–99)
GLUCOSE BLD-MCNC: 343 MG/DL (ref 70–99)
GLUCOSE BLD-MCNC: 98 MG/DL (ref 70–99)
PERFORMED ON: ABNORMAL
POTASSIUM SERPL-SCNC: 3.7 MMOL/L (ref 3.5–5.1)
SODIUM BLD-SCNC: 142 MMOL/L (ref 136–145)

## 2020-06-07 PROCEDURE — 2580000003 HC RX 258: Performed by: INTERNAL MEDICINE

## 2020-06-07 PROCEDURE — 6370000000 HC RX 637 (ALT 250 FOR IP): Performed by: INTERNAL MEDICINE

## 2020-06-07 PROCEDURE — 6370000000 HC RX 637 (ALT 250 FOR IP): Performed by: NURSE PRACTITIONER

## 2020-06-07 PROCEDURE — 96372 THER/PROPH/DIAG INJ SC/IM: CPT

## 2020-06-07 PROCEDURE — 99225 PR SBSQ OBSERVATION CARE/DAY 25 MINUTES: CPT | Performed by: NURSE PRACTITIONER

## 2020-06-07 PROCEDURE — G0378 HOSPITAL OBSERVATION PER HR: HCPCS

## 2020-06-07 PROCEDURE — 6360000002 HC RX W HCPCS: Performed by: INTERNAL MEDICINE

## 2020-06-07 RX ORDER — CLONAZEPAM 0.5 MG/1
0.5 TABLET ORAL
Status: COMPLETED | OUTPATIENT
Start: 2020-06-07 | End: 2020-06-07

## 2020-06-07 RX ADMIN — Medication 10 ML: at 22:36

## 2020-06-07 RX ADMIN — ENOXAPARIN SODIUM 40 MG: 40 INJECTION SUBCUTANEOUS at 08:46

## 2020-06-07 RX ADMIN — INSULIN LISPRO 4 UNITS: 100 INJECTION, SOLUTION INTRAVENOUS; SUBCUTANEOUS at 17:01

## 2020-06-07 RX ADMIN — TRAZODONE HYDROCHLORIDE 100 MG: 50 TABLET ORAL at 22:36

## 2020-06-07 RX ADMIN — ASPIRIN 81 MG 81 MG: 81 TABLET ORAL at 08:45

## 2020-06-07 RX ADMIN — INSULIN LISPRO 3 UNITS: 100 INJECTION, SOLUTION INTRAVENOUS; SUBCUTANEOUS at 13:12

## 2020-06-07 RX ADMIN — ATORVASTATIN CALCIUM 40 MG: 40 TABLET, FILM COATED ORAL at 22:36

## 2020-06-07 RX ADMIN — OXYCODONE HYDROCHLORIDE AND ACETAMINOPHEN 1 TABLET: 5; 325 TABLET ORAL at 08:46

## 2020-06-07 RX ADMIN — OXYCODONE HYDROCHLORIDE AND ACETAMINOPHEN 1 TABLET: 5; 325 TABLET ORAL at 16:15

## 2020-06-07 RX ADMIN — Medication 10 ML: at 08:46

## 2020-06-07 RX ADMIN — INSULIN LISPRO 3 UNITS: 100 INJECTION, SOLUTION INTRAVENOUS; SUBCUTANEOUS at 08:51

## 2020-06-07 RX ADMIN — INSULIN LISPRO 2 UNITS: 100 INJECTION, SOLUTION INTRAVENOUS; SUBCUTANEOUS at 22:36

## 2020-06-07 RX ADMIN — CLONAZEPAM 0.5 MG: 0.5 TABLET ORAL at 16:15

## 2020-06-07 ASSESSMENT — PAIN DESCRIPTION - LOCATION: LOCATION: CHEST

## 2020-06-07 ASSESSMENT — PAIN DESCRIPTION - DESCRIPTORS: DESCRIPTORS: PRESSURE

## 2020-06-07 ASSESSMENT — PAIN SCALES - GENERAL
PAINLEVEL_OUTOF10: 3
PAINLEVEL_OUTOF10: 0
PAINLEVEL_OUTOF10: 6
PAINLEVEL_OUTOF10: 6
PAINLEVEL_OUTOF10: 0

## 2020-06-07 NOTE — PROGRESS NOTES
Hospitalist Progress Note      PCP: [facility]    Date of Admission: 6/5/2020    Chief Complaint: Chest pain    Hospital Course: Reviewed H&P    Subjective: Patient had NM stress test on 6/7/2020 -  Showed myocardial scar. discussed with patient stress test findings . Patient continued to have intermittent chest pain yesterday evening and hence cardiology recommended LHC scheduled for Monday, 6/8/2020. Patient currently remains symptom-free. Discussed with RN regarding plan of care. Medications:  Reviewed    Infusion Medications    dextrose       Scheduled Medications    sodium chloride flush  10 mL Intravenous 2 times per day    atorvastatin  40 mg Oral Nightly    aspirin  81 mg Oral Daily    enoxaparin  40 mg Subcutaneous Daily    insulin lispro  0-6 Units Subcutaneous TID WC    insulin lispro  0-3 Units Subcutaneous Nightly    traZODone  100 mg Oral Nightly     PRN Meds: perflutren lipid microspheres, sodium chloride flush, acetaminophen **OR** acetaminophen, polyethylene glycol, promethazine **OR** ondansetron, oxyCODONE-acetaminophen, glucose, dextrose, glucagon (rDNA), dextrose      Intake/Output Summary (Last 24 hours) at 6/7/2020 1208  Last data filed at 6/7/2020 0870  Gross per 24 hour   Intake 360 ml   Output 800 ml   Net -440 ml       Physical Exam Performed:  BP (!) 112/59   Pulse 59   Temp 98.3 °F (36.8 °C) (Oral)   Resp 16   Ht 5' 3\" (1.6 m)   Wt 151 lb 14.4 oz (68.9 kg)   SpO2 98%   BMI 26.91 kg/m²     General appearance: No apparent distress, appears stated age and cooperative. HEENT: Pupils equal, round, and reactive to light. Conjunctivae/corneas clear. Neck: Supple, with full range of motion. No jugular venous distention. Trachea midline. Respiratory:  Normal respiratory effort. Clear to auscultation, bilaterally without Rales/Wheezes/Rhonchi. Cardiovascular: Regular rate and rhythm with normal S1/S2 without murmurs, rubs or gallops.   Abdomen: Soft, non-tender, non-distended with normal bowel sounds. Musculoskeletal: No clubbing, cyanosis or edema bilaterally. Full range of motion without deformity. Skin: Skin color, texture, turgor normal.  No rashes or lesions. Neurologic:  Neurovascularly intact without any focal sensory/motor deficits. Cranial nerves: II-XII intact, grossly non-focal.  Psychiatric: Alert and oriented, thought content appropriate, normal insight  Capillary Refill: Brisk,< 3 seconds   Peripheral Pulses: +2 palpable, equal bilaterally       Labs:   Recent Labs     06/05/20  1100 06/06/20  0728   WBC 6.5 5.4   HGB 10.0* 9.3*   HCT 31.5* 29.2*    152     Recent Labs     06/05/20  1100      K 3.6      CO2 21   BUN 22*   CREATININE 1.1   CALCIUM 9.3     Recent Labs     06/05/20  1100   AST 24   ALT 32   BILITOT <0.2   ALKPHOS 130*     No results for input(s): INR in the last 72 hours. Recent Labs     06/05/20  1420 06/05/20  1835 06/05/20  2133   TROPONINI <0.01 <0.01 <0.01       Radiology:  NM Cardiac Stress Test Nuclear Imaging   Final Result      CT CHEST PULMONARY EMBOLISM W CONTRAST   Final Result   1. No acute abnormality. 2. 3 mm solid right upper lobe pulmonary nodule bears attention on the next   imaging cycle. XR CHEST PORTABLE   Preliminary Result   Mild bibasilar atelectasis. Assessment/Plan:    Active Hospital Problems    Diagnosis Date Noted    Chest pain [R07.9] 06/05/2020     1. Chest pain - ACS  ruled out  -Initial EKG nonischemic, troponin negative. Patient had recurrence of symptoms in ED for which cardiology was consulted . Recommended admission for further stress testing for risk stratification. NM stress test and echocardiogram ordered by cardiology. NM stress test on 6/6/2020 showed myocardial scar. Echo showed normal LVEF 55%. No R WMA. -CTPA negative for pulmonary embolism on admission  -Continue aspirin, nitro PRN, home dose of Percocet PRN for pain control.

## 2020-06-07 NOTE — PLAN OF CARE
Assessment:  What is this patient's Current Level of Mobility?: Ambulatory-Up [place]  How was this patient Mobilized today?: Edge of Bed, Up to Chair, and Up in Room                                                             With Whom? Self                                                             Level of Difficulty/Assistance: Independent      Pt resting in bed at this time on room air. Pt denies shortness of breath. Pt without lower extremity edema. Patient and/or Family's stated Goal of Care this Admission: increase activity tolerance and be more comfortable prior to discharge       Problem: ACTIVITY INTOLERANCE/IMPAIRED MOBILITY  Goal: Mobility/activity is maintained at optimum level for patient  Outcome: Ongoing     Diabetes education provided today:    Managing high and low sugar readings.       Problem: Metabolic:  Goal: Complications related to the disease process, condition or treatment will be avoided or minimized  Description: Complications related to the disease process, condition or treatment will be avoided or minimized  Outcome: Ongoing

## 2020-06-07 NOTE — PROGRESS NOTES
[address]  Cardiology  Progress Note    Admission date:  2020    Reason for follow up visit: chest pain     HPI/CC: Obed Jimenez is a 63 y.o. female who presented to the hospital with complaints of chest pain. Note deep inspiration makes chest pain worse. CT without PE. Troponin <0.01 x4. Stress test and echocardiogram ordered. Telemetry overnight has been NSR 60-70. ACS ruled out, stress test showed possible myocardial scar. EF 55%. Discussed ongoing chest pressure with Dr. Gerard Cook will proceed with [facility] tomorrow. Subjective: Resting in bed,pt remains to complain of intermittent chest pressure that happens at rest and with exertion. This pressure doesn't radiate, just mid sternal.  She denies any increased shortness of breath,     Vitals:  Blood pressure (!) 112/59, pulse 59, temperature 98.3 °F (36.8 °C), temperature source Oral, resp. rate 16, height 5' 3\" (1.6 m), weight 151 lb 14.4 oz (68.9 kg), SpO2 98 %, not currently breastfeeding.   Temp  Av.3 °F (36.8 °C)  Min: 97.8 °F (36.6 °C)  Max: 98.8 °F (37.1 °C)  Pulse  Av.2  Min: 56  Max: 69  BP  Min: 112/59  Max: 140/74  SpO2  Av.5 %  Min: 97 %  Max: 100 %    24 hour I/O    Intake/Output Summary (Last 24 hours) at 2020 1149  Last data filed at 2020 2382  Gross per 24 hour   Intake 360 ml   Output 800 ml   Net -440 ml     Current Facility-Administered Medications   Medication Dose Route Frequency Provider Last Rate Last Dose    perflutren lipid microspheres (DEFINITY) injection 1.65 mg  1.5 mL Intravenous ONCE PRN Rosalba Last MD        sodium chloride flush 0.9 % injection 10 mL  10 mL Intravenous 2 times per day Betty Shepherd MD   10 mL at 20 0846    sodium chloride flush 0.9 % injection 10 mL  10 mL Intravenous PRN Betty Shepherd MD        acetaminophen (TYLENOL) tablet 650 mg  650 mg Oral Q6H PRN Betty Shepherd MD        Or    acetaminophen (TYLENOL) suppository 650 mg  650 mg Rectal Q6H PRN Jo Ann Terry MD        polyethylene glycol (GLYCOLAX) packet 17 g  17 g Oral Daily PRN Jo Ann Terry MD        promethazine (PHENERGAN) tablet 12.5 mg  12.5 mg Oral Q6H PRN Jo Ann Terry MD        Or    ondansetron (ZOFRAN) injection 4 mg  4 mg Intravenous Q6H PRN Jo Ann Terry MD        atorvastatin (LIPITOR) tablet 40 mg  40 mg Oral Nightly Jo Ann Terry MD   40 mg at 06/06/20 2031    aspirin chewable tablet 81 mg  81 mg Oral Daily Jo Ann Terry MD   81 mg at 06/07/20 0845    enoxaparin (LOVENOX) injection 40 mg  40 mg Subcutaneous Daily Jo Ann Terry MD   40 mg at 06/07/20 0846    oxyCODONE-acetaminophen (PERCOCET) 5-325 MG per tablet 1 tablet  1 tablet Oral Q6H PRN Jo Ann Terry MD   1 tablet at 06/07/20 0846    glucose (GLUTOSE) 40 % oral gel 15 g  15 g Oral PRN Mis Washington, APRN - CNP        dextrose 50 % IV solution  12.5 g Intravenous PRN Mis Washington, APRN - CNP        glucagon (rDNA) injection 1 mg  1 mg Intramuscular PRN Mis Washington, APRN - CNP        dextrose 5 % solution  100 mL/hr Intravenous PRN Mis Washington, APRN - CNP        insulin lispro (HUMALOG) injection vial 0-6 Units  0-6 Units Subcutaneous TID WC Mis Washington, APRN - CNP   3 Units at 06/07/20 0851    insulin lispro (HUMALOG) injection vial 0-3 Units  0-3 Units Subcutaneous Nightly Mis Washington, APRN - CNP   2 Units at 06/06/20 2030    traZODone (DESYREL) tablet 100 mg  100 mg Oral Nightly Mis Washington, APRN - CNP   100 mg at 06/06/20 2031           Objective:     Telemetry monitor: NSR 60-70    Physical Exam:  Constitutional:  Comfortable and alert, NAD, appears stated age  Eyes: PERRL, sclera nonicteric  Neck:  Supple, no masses, no thyroidmegaly, no JVD  Skin:  Warm and dry; no rash or lesions  Heart:  Regular, normal apex, S1 and S2 normal, no M/G/R  Lungs:  Normal respiratory effort; CTA  Abdomen: soft, non tender, + bowel sounds  Extremities:  No edema or cyanosis; no clubbing  Neuro: alert and oriented, moves legs and arms equally, normal mood and affect      Data Reviewed:  Echo 6/6/2020:  Summary   Normal left ventricle systolic function with an estimated ejection fraction   of 55%. No regional wall motion abnormalities are seen. Normal left ventricular diastolic filling pressure. Trace mitral, pulmonic and tricuspid regurgitation. Systolic pulmonary artery pressure (SPAP) is normal and estimated at 27 mmHg   (right atrial pressure 3 mmHg). Stress Test 6/6/2020:  Summary    ABNORMAL LOW RISK STRESS TEST    Normal LV size and systolic function. Left ventricular ejection fraction of    70%. MIld hypokinesis of the inferolateral wall. THere is a small defect in    the inferolateral wall at stress and rest consistent with possible    myocardial scar. No gross ischemia. EKG 6/5/2020:  Normal sinus rhythmNormal ECGWhen compared with ECG of 22-JAN-2020 20:18,Premature atrial complexes are no longer PresentNonspecific T wave abnormality now evident in Lateral leadsConfirmed by Tae Jimenez MD, Pedro Foster ([id number]) on 6/5/2020 2:22:48 PM     EKG 1/22/2020:  Sinus rhythm with Premature atrial complexesNonspecific ST abnormalityWhen compared with ECG of 17-SEP-2017 07:35,Premature atrial complexes are now PresentConfirmed by BRIA Nur MD ([id number]) on 1/23/2020 1:44:57 PM       Echo 8/24/2017:  Study Conclusions    - Left ventricle: The cavity size was normal. Wall thickness was    mildly increased. There was mild concentric hypertrophy. Systolic    function was normal. The calculated ejection fraction was in the  [place] of 59% to 63%. Although no diagnostic regional wall motion    abnormality was identified, this possibility cannot be completely    excluded on the basis of this study. Normal diastolic function.  - Aortic valve: Thickening, consistent with sclerosis. There was    trivial regurgitation.  - Right ventricle:  The cavity size was mildly blood clots/emboli, damage to blood vessels, renal failure, malignant cardiac arrhythmias, stroke, heart attack, emergent coronary bypass surgery, death, dye allergy. The patient (and guardian as appropriate) expressed understanding of the aforementioned and wished to proceed. 3. BMP today  4.  Continue to monitor telemetry    Discussed plan of care with Dr. Cash Martel, APRN - [address]  [phone]

## 2020-06-07 NOTE — PROGRESS NOTES
Spoke with Jennifer Billy NP on phone. Discussed whether or not pts needed to have a COVID test done prior to upcoming scheduled procedures. She spoke with Dr. Camila Herrera. He confirmed this. COVID test ordered.

## 2020-06-07 NOTE — PROGRESS NOTES
admit  -Telemetry without arrhythmias ;troponi x 3  negative; checked FLP      2. Hypertension -optimal control on home medication; continue     3. Type 2 diabetes mellitus -uncontrolled with last A1c 8.4% dated 12/20/2019. Patient reports that she take Lantus and Victoza at home and follows with endocrinology as outpatient.  -Continue home regimen and monitor blood glucose.     4. Degenerative disc disease lumbar spine -patient on Percocet PRN at home; continue     5. History of breast cancer -currently in remission     6. Right upper lobe solitary pulmonary nodule - Radiology recommended attention on the next imaging cycle. DVT Prophylaxis: Lovenox  Diet: DIET CARDIAC; Carb Control: 4 carb choices (60 gms)/meal; No Caffeine  Code Status: Full Code    PT/OT Eval Status: Ambulatory    Dispo -okay to discharge home later today if okay with cardiology. The note was completed using Dragon -speech recognition software & EMR  . Every effort was made to ensure accuracy; however, inadvertent computerized transcription errors may be present.     Austin Hackett MD

## 2020-06-08 ENCOUNTER — APPOINTMENT (OUTPATIENT)
Dept: CARDIAC CATH/INVASIVE PROCEDURES | Age: 60
DRG: 175 | End: 2020-06-08
Payer: MEDICAID

## 2020-06-08 LAB
ANION GAP SERPL CALCULATED.3IONS-SCNC: 9 MMOL/L (ref 3–16)
BUN BLDV-MCNC: 19 MG/DL (ref 7–20)
C-REACTIVE PROTEIN: 0.5 MG/L (ref 0–5.1)
CALCIUM SERPL-MCNC: 9.1 MG/DL (ref 8.3–10.6)
CHLORIDE BLD-SCNC: 107 MMOL/L (ref 99–110)
CHOLESTEROL, TOTAL: 82 MG/DL (ref 0–199)
CO2: 22 MMOL/L (ref 21–32)
CREAT SERPL-MCNC: 0.8 MG/DL (ref 0.6–1.2)
EKG ATRIAL RATE: 62 BPM
EKG DIAGNOSIS: NORMAL
EKG P AXIS: 56 DEGREES
EKG P-R INTERVAL: 156 MS
EKG Q-T INTERVAL: 430 MS
EKG QRS DURATION: 86 MS
EKG QTC CALCULATION (BAZETT): 436 MS
EKG R AXIS: -7 DEGREES
EKG T AXIS: 69 DEGREES
EKG VENTRICULAR RATE: 62 BPM
GFR AFRICAN AMERICAN: >60
GFR NON-AFRICAN AMERICAN: >60
GLUCOSE BLD-MCNC: 277 MG/DL (ref 70–99)
GLUCOSE BLD-MCNC: 311 MG/DL (ref 70–99)
GLUCOSE BLD-MCNC: 379 MG/DL (ref 70–99)
GLUCOSE BLD-MCNC: 468 MG/DL (ref 70–99)
GLUCOSE BLD-MCNC: 537 MG/DL (ref 70–99)
HDLC SERPL-MCNC: 30 MG/DL (ref 40–60)
LDL CHOLESTEROL CALCULATED: 34 MG/DL
LEFT VENTRICULAR EJECTION FRACTION HIGH VALUE: 65 %
LEFT VENTRICULAR EJECTION FRACTION MODE: NORMAL
PERFORMED ON: ABNORMAL
POTASSIUM SERPL-SCNC: 4.2 MMOL/L (ref 3.5–5.1)
SEDIMENTATION RATE, ERYTHROCYTE: 34 MM/HR (ref 0–30)
SODIUM BLD-SCNC: 138 MMOL/L (ref 136–145)
TRIGL SERPL-MCNC: 91 MG/DL (ref 0–150)
VLDLC SERPL CALC-MCNC: 18 MG/DL

## 2020-06-08 PROCEDURE — 4A023N7 MEASUREMENT OF CARDIAC SAMPLING AND PRESSURE, LEFT HEART, PERCUTANEOUS APPROACH: ICD-10-PCS | Performed by: INTERNAL MEDICINE

## 2020-06-08 PROCEDURE — 93458 L HRT ARTERY/VENTRICLE ANGIO: CPT | Performed by: INTERNAL MEDICINE

## 2020-06-08 PROCEDURE — 6370000000 HC RX 637 (ALT 250 FOR IP): Performed by: INTERNAL MEDICINE

## 2020-06-08 PROCEDURE — 93458 L HRT ARTERY/VENTRICLE ANGIO: CPT

## 2020-06-08 PROCEDURE — C1894 INTRO/SHEATH, NON-LASER: HCPCS

## 2020-06-08 PROCEDURE — 86140 C-REACTIVE PROTEIN: CPT

## 2020-06-08 PROCEDURE — 96376 TX/PRO/DX INJ SAME DRUG ADON: CPT

## 2020-06-08 PROCEDURE — 80061 LIPID PANEL: CPT

## 2020-06-08 PROCEDURE — 85652 RBC SED RATE AUTOMATED: CPT

## 2020-06-08 PROCEDURE — C1874 STENT, COATED/COV W/DEL SYS: HCPCS

## 2020-06-08 PROCEDURE — C1887 CATHETER, GUIDING: HCPCS

## 2020-06-08 PROCEDURE — B2151ZZ FLUOROSCOPY OF LEFT HEART USING LOW OSMOLAR CONTRAST: ICD-10-PCS | Performed by: INTERNAL MEDICINE

## 2020-06-08 PROCEDURE — 93005 ELECTROCARDIOGRAM TRACING: CPT | Performed by: INTERNAL MEDICINE

## 2020-06-08 PROCEDURE — 6360000002 HC RX W HCPCS

## 2020-06-08 PROCEDURE — 93010 ELECTROCARDIOGRAM REPORT: CPT | Performed by: INTERNAL MEDICINE

## 2020-06-08 PROCEDURE — 6360000004 HC RX CONTRAST MEDICATION

## 2020-06-08 PROCEDURE — 36415 COLL VENOUS BLD VENIPUNCTURE: CPT

## 2020-06-08 PROCEDURE — 027034Z DILATION OF CORONARY ARTERY, ONE ARTERY WITH DRUG-ELUTING INTRALUMINAL DEVICE, PERCUTANEOUS APPROACH: ICD-10-PCS | Performed by: INTERNAL MEDICINE

## 2020-06-08 PROCEDURE — 92928 PRQ TCAT PLMT NTRAC ST 1 LES: CPT

## 2020-06-08 PROCEDURE — 2500000003 HC RX 250 WO HCPCS

## 2020-06-08 PROCEDURE — 96365 THER/PROPH/DIAG IV INF INIT: CPT

## 2020-06-08 PROCEDURE — 6370000000 HC RX 637 (ALT 250 FOR IP)

## 2020-06-08 PROCEDURE — 2580000003 HC RX 258

## 2020-06-08 PROCEDURE — 6360000002 HC RX W HCPCS: Performed by: NURSE PRACTITIONER

## 2020-06-08 PROCEDURE — 2709999900 HC NON-CHARGEABLE SUPPLY

## 2020-06-08 PROCEDURE — 99152 MOD SED SAME PHYS/QHP 5/>YRS: CPT | Performed by: INTERNAL MEDICINE

## 2020-06-08 PROCEDURE — 96375 TX/PRO/DX INJ NEW DRUG ADDON: CPT

## 2020-06-08 PROCEDURE — C1725 CATH, TRANSLUMIN NON-LASER: HCPCS

## 2020-06-08 PROCEDURE — 2060000000 HC ICU INTERMEDIATE R&B

## 2020-06-08 PROCEDURE — 85347 COAGULATION TIME ACTIVATED: CPT

## 2020-06-08 PROCEDURE — 92928 PRQ TCAT PLMT NTRAC ST 1 LES: CPT | Performed by: INTERNAL MEDICINE

## 2020-06-08 PROCEDURE — 80048 BASIC METABOLIC PNL TOTAL CA: CPT

## 2020-06-08 PROCEDURE — 2580000003 HC RX 258: Performed by: INTERNAL MEDICINE

## 2020-06-08 PROCEDURE — B2111ZZ FLUOROSCOPY OF MULTIPLE CORONARY ARTERIES USING LOW OSMOLAR CONTRAST: ICD-10-PCS | Performed by: INTERNAL MEDICINE

## 2020-06-08 PROCEDURE — C1769 GUIDE WIRE: HCPCS

## 2020-06-08 RX ORDER — KETOROLAC TROMETHAMINE 30 MG/ML
30 INJECTION, SOLUTION INTRAMUSCULAR; INTRAVENOUS ONCE
Status: COMPLETED | OUTPATIENT
Start: 2020-06-08 | End: 2020-06-08

## 2020-06-08 RX ORDER — FENTANYL CITRATE 50 UG/ML
25 INJECTION, SOLUTION INTRAMUSCULAR; INTRAVENOUS ONCE
Status: COMPLETED | OUTPATIENT
Start: 2020-06-08 | End: 2020-06-08

## 2020-06-08 RX ORDER — EPTIFIBATIDE 0.75 MG/ML
2 INJECTION, SOLUTION INTRAVENOUS CONTINUOUS
Status: DISPENSED | OUTPATIENT
Start: 2020-06-08 | End: 2020-06-08

## 2020-06-08 RX ORDER — HEPARIN SODIUM 1000 [USP'U]/ML
1000 INJECTION, SOLUTION INTRAVENOUS; SUBCUTANEOUS ONCE
Status: COMPLETED | OUTPATIENT
Start: 2020-06-08 | End: 2020-06-08

## 2020-06-08 RX ORDER — SODIUM CHLORIDE 9 MG/ML
INJECTION, SOLUTION INTRAVENOUS
Status: DISPENSED
Start: 2020-06-08 | End: 2020-06-08

## 2020-06-08 RX ORDER — HEPARIN SODIUM 1000 [USP'U]/ML
3400 INJECTION, SOLUTION INTRAVENOUS; SUBCUTANEOUS ONCE
Status: COMPLETED | OUTPATIENT
Start: 2020-06-08 | End: 2020-06-08

## 2020-06-08 RX ORDER — MIDAZOLAM HYDROCHLORIDE 1 MG/ML
2 INJECTION INTRAMUSCULAR; INTRAVENOUS ONCE
Status: COMPLETED | OUTPATIENT
Start: 2020-06-08 | End: 2020-06-08

## 2020-06-08 RX ORDER — MIDAZOLAM HYDROCHLORIDE 1 MG/ML
1 INJECTION INTRAMUSCULAR; INTRAVENOUS ONCE
Status: COMPLETED | OUTPATIENT
Start: 2020-06-08 | End: 2020-06-08

## 2020-06-08 RX ORDER — HEPARIN SODIUM 1000 [USP'U]/ML
1400 INJECTION, SOLUTION INTRAVENOUS; SUBCUTANEOUS ONCE
Status: COMPLETED | OUTPATIENT
Start: 2020-06-08 | End: 2020-06-08

## 2020-06-08 RX ORDER — SODIUM CHLORIDE 0.9 % (FLUSH) 0.9 %
10 SYRINGE (ML) INJECTION PRN
Status: DISCONTINUED | OUTPATIENT
Start: 2020-06-08 | End: 2020-06-08 | Stop reason: SDUPTHER

## 2020-06-08 RX ORDER — SODIUM CHLORIDE 9 MG/ML
INJECTION, SOLUTION INTRAVENOUS CONTINUOUS
Status: DISCONTINUED | OUTPATIENT
Start: 2020-06-08 | End: 2020-06-08

## 2020-06-08 RX ORDER — SODIUM CHLORIDE 0.9 % (FLUSH) 0.9 %
10 SYRINGE (ML) INJECTION EVERY 12 HOURS SCHEDULED
Status: DISCONTINUED | OUTPATIENT
Start: 2020-06-08 | End: 2020-06-08 | Stop reason: SDUPTHER

## 2020-06-08 RX ADMIN — OXYCODONE HYDROCHLORIDE AND ACETAMINOPHEN 1 TABLET: 5; 325 TABLET ORAL at 10:17

## 2020-06-08 RX ADMIN — HEPARIN SODIUM 1400 UNITS: 1000 INJECTION, SOLUTION INTRAVENOUS; SUBCUTANEOUS at 09:08

## 2020-06-08 RX ADMIN — INSULIN LISPRO 6 UNITS: 100 INJECTION, SOLUTION INTRAVENOUS; SUBCUTANEOUS at 12:46

## 2020-06-08 RX ADMIN — INSULIN LISPRO 7 UNITS: 100 INJECTION, SOLUTION INTRAVENOUS; SUBCUTANEOUS at 20:14

## 2020-06-08 RX ADMIN — INSULIN LISPRO 18 UNITS: 100 INJECTION, SOLUTION INTRAVENOUS; SUBCUTANEOUS at 17:21

## 2020-06-08 RX ADMIN — EPTIFIBATIDE 2 MCG/KG/MIN: 0.75 INJECTION, SOLUTION INTRAVENOUS at 09:15

## 2020-06-08 RX ADMIN — TICAGRELOR 90 MG: 90 TABLET ORAL at 20:14

## 2020-06-08 RX ADMIN — Medication 10 ML: at 21:46

## 2020-06-08 RX ADMIN — ACETAMINOPHEN 650 MG: 325 TABLET ORAL at 20:14

## 2020-06-08 RX ADMIN — HEPARIN SODIUM 1000 UNITS: 1000 INJECTION, SOLUTION INTRAVENOUS; SUBCUTANEOUS at 09:21

## 2020-06-08 RX ADMIN — FENTANYL CITRATE 25 MCG: 50 INJECTION, SOLUTION INTRAMUSCULAR; INTRAVENOUS at 08:58

## 2020-06-08 RX ADMIN — OXYCODONE HYDROCHLORIDE AND ACETAMINOPHEN 1 TABLET: 5; 325 TABLET ORAL at 00:58

## 2020-06-08 RX ADMIN — KETOROLAC TROMETHAMINE 30 MG: 30 INJECTION, SOLUTION INTRAMUSCULAR at 21:51

## 2020-06-08 RX ADMIN — HEPARIN SODIUM 3400 UNITS: 1000 INJECTION, SOLUTION INTRAVENOUS; SUBCUTANEOUS at 08:59

## 2020-06-08 RX ADMIN — MIDAZOLAM HYDROCHLORIDE 1 MG: 1 INJECTION INTRAMUSCULAR; INTRAVENOUS at 09:08

## 2020-06-08 RX ADMIN — ASPIRIN 81 MG 81 MG: 81 TABLET ORAL at 08:07

## 2020-06-08 RX ADMIN — Medication 10 ML: at 08:12

## 2020-06-08 RX ADMIN — ATORVASTATIN CALCIUM 40 MG: 40 TABLET, FILM COATED ORAL at 20:14

## 2020-06-08 RX ADMIN — EPTIFIBATIDE 2 MCG/KG/MIN: 0.75 INJECTION, SOLUTION INTRAVENOUS at 12:46

## 2020-06-08 RX ADMIN — MIDAZOLAM HYDROCHLORIDE 2 MG: 1 INJECTION INTRAMUSCULAR; INTRAVENOUS at 08:57

## 2020-06-08 RX ADMIN — OXYCODONE HYDROCHLORIDE AND ACETAMINOPHEN 1 TABLET: 5; 325 TABLET ORAL at 23:01

## 2020-06-08 RX ADMIN — TRAZODONE HYDROCHLORIDE 100 MG: 50 TABLET ORAL at 20:14

## 2020-06-08 ASSESSMENT — PAIN SCALES - GENERAL
PAINLEVEL_OUTOF10: 0
PAINLEVEL_OUTOF10: 10
PAINLEVEL_OUTOF10: 3
PAINLEVEL_OUTOF10: 6
PAINLEVEL_OUTOF10: 0
PAINLEVEL_OUTOF10: 6

## 2020-06-08 NOTE — PROGRESS NOTES
PIV infiltrated in RAC prior to arrival to cath lab and was changed out by patients nurse. RAC site pink, tender and warm to touch. Dr Gemma Yu at bedside in cath lab, noted site, pt already has percocet ordered, given see mar. Also instructed to place warm compress - hot pack tucked into pillow case placed on rac with some relief of pain per pt. Will continue to monitor. Pt aware we are waiting on ready bed on C4. Call light in reach. Pt sitting up in bed watching tv, meal ordered.

## 2020-06-08 NOTE — PROGRESS NOTES
Perfect serve to Alok Sender:  \"Patient has a fever of 102.1, No other symptoms. Looks like first time of having a fever since admission. Tylenol will be given. Patient had heart cath with stent placement today. Thanks. \"

## 2020-06-08 NOTE — PLAN OF CARE
Assessment:  What is this patient's Current Level of Mobility?: Ambulatory-Up [place]  How was this patient Mobilized today?: Edge of Bed, Up to Chair, and Up in Room                                                             With Whom? Self                                                             Level of Difficulty/Assistance: Independent      Pt resting in bed at this time on room air. Pt denies shortness of breath. Pt without lower extremity edema.       Patient and/or Family's stated Goal of Care this Admission: increase activity tolerance and be more comfortable prior to discharge

## 2020-06-08 NOTE — PLAN OF CARE
Problem: Metabolic:  Goal: Complications related to the disease process, condition or treatment will be avoided or minimized  Description: Complications related to the disease process, condition or treatment will be avoided or minimized  Outcome: Ongoing     Problem: HEMODYNAMIC STATUS  Goal: Patient has stable vital signs and fluid balance  Outcome: Ongoing

## 2020-06-09 PROBLEM — I25.118 CORONARY ARTERY DISEASE OF NATIVE ARTERY OF NATIVE HEART WITH STABLE ANGINA PECTORIS (HCC): Status: ACTIVE | Noted: 2020-06-09

## 2020-06-09 LAB
ANION GAP SERPL CALCULATED.3IONS-SCNC: 12 MMOL/L (ref 3–16)
BACTERIA: ABNORMAL /HPF
BILIRUBIN URINE: NEGATIVE
BLOOD, URINE: ABNORMAL
BUN BLDV-MCNC: 22 MG/DL (ref 7–20)
CALCIUM SERPL-MCNC: 9 MG/DL (ref 8.3–10.6)
CHLORIDE BLD-SCNC: 106 MMOL/L (ref 99–110)
CLARITY: CLEAR
CO2: 21 MMOL/L (ref 21–32)
COLOR: YELLOW
CREAT SERPL-MCNC: 1.2 MG/DL (ref 0.6–1.2)
EPITHELIAL CELLS, UA: ABNORMAL /HPF (ref 0–5)
ESTIMATED AVERAGE GLUCOSE: 211.6 MG/DL
GFR AFRICAN AMERICAN: 55
GFR NON-AFRICAN AMERICAN: 46
GLUCOSE BLD-MCNC: 249 MG/DL (ref 70–99)
GLUCOSE BLD-MCNC: 276 MG/DL (ref 70–99)
GLUCOSE BLD-MCNC: 350 MG/DL (ref 70–99)
GLUCOSE BLD-MCNC: 358 MG/DL (ref 70–99)
GLUCOSE BLD-MCNC: 408 MG/DL (ref 70–99)
GLUCOSE URINE: >=1000 MG/DL
HBA1C MFR BLD: 9 %
HCT VFR BLD CALC: 29.1 % (ref 36–48)
HEMOGLOBIN: 9.4 G/DL (ref 12–16)
KETONES, URINE: NEGATIVE MG/DL
LEUKOCYTE ESTERASE, URINE: NEGATIVE
MCH RBC QN AUTO: 29.3 PG (ref 26–34)
MCHC RBC AUTO-ENTMCNC: 32.4 G/DL (ref 31–36)
MCV RBC AUTO: 90.5 FL (ref 80–100)
MICROSCOPIC EXAMINATION: YES
NITRITE, URINE: POSITIVE
PDW BLD-RTO: 12.9 % (ref 12.4–15.4)
PERFORMED ON: ABNORMAL
PH UA: 5.5 (ref 5–8)
PLATELET # BLD: 156 K/UL (ref 135–450)
PMV BLD AUTO: 9.4 FL (ref 5–10.5)
POC ACT LR: 217 SEC
POTASSIUM REFLEX MAGNESIUM: 3.8 MMOL/L (ref 3.5–5.1)
POTASSIUM SERPL-SCNC: 3.8 MMOL/L (ref 3.5–5.1)
PROTEIN UA: ABNORMAL MG/DL
RBC # BLD: 3.21 M/UL (ref 4–5.2)
RBC UA: ABNORMAL /HPF (ref 0–4)
SODIUM BLD-SCNC: 139 MMOL/L (ref 136–145)
SPECIFIC GRAVITY UA: 1.01 (ref 1–1.03)
URINE TYPE: ABNORMAL
UROBILINOGEN, URINE: 0.2 E.U./DL
WBC # BLD: 4.8 K/UL (ref 4–11)
WBC UA: ABNORMAL /HPF (ref 0–5)

## 2020-06-09 PROCEDURE — 2060000000 HC ICU INTERMEDIATE R&B

## 2020-06-09 PROCEDURE — 2580000003 HC RX 258: Performed by: INTERNAL MEDICINE

## 2020-06-09 PROCEDURE — 87077 CULTURE AEROBIC IDENTIFY: CPT

## 2020-06-09 PROCEDURE — 6360000002 HC RX W HCPCS: Performed by: INTERNAL MEDICINE

## 2020-06-09 PROCEDURE — 85027 COMPLETE CBC AUTOMATED: CPT

## 2020-06-09 PROCEDURE — 6370000000 HC RX 637 (ALT 250 FOR IP): Performed by: INTERNAL MEDICINE

## 2020-06-09 PROCEDURE — 87086 URINE CULTURE/COLONY COUNT: CPT

## 2020-06-09 PROCEDURE — 83036 HEMOGLOBIN GLYCOSYLATED A1C: CPT

## 2020-06-09 PROCEDURE — 87186 SC STD MICRODIL/AGAR DIL: CPT

## 2020-06-09 PROCEDURE — 80048 BASIC METABOLIC PNL TOTAL CA: CPT

## 2020-06-09 PROCEDURE — 36415 COLL VENOUS BLD VENIPUNCTURE: CPT

## 2020-06-09 PROCEDURE — 99233 SBSQ HOSP IP/OBS HIGH 50: CPT | Performed by: NURSE PRACTITIONER

## 2020-06-09 PROCEDURE — 81001 URINALYSIS AUTO W/SCOPE: CPT

## 2020-06-09 RX ORDER — CIPROFLOXACIN 2 MG/ML
400 INJECTION, SOLUTION INTRAVENOUS EVERY 12 HOURS
Status: DISCONTINUED | OUTPATIENT
Start: 2020-06-09 | End: 2020-06-11

## 2020-06-09 RX ORDER — GLIMEPIRIDE 2 MG/1
4 TABLET ORAL
Status: DISCONTINUED | OUTPATIENT
Start: 2020-06-10 | End: 2020-06-11 | Stop reason: HOSPADM

## 2020-06-09 RX ORDER — INSULIN GLARGINE 100 [IU]/ML
22 INJECTION, SOLUTION SUBCUTANEOUS EVERY MORNING
Status: DISCONTINUED | OUTPATIENT
Start: 2020-06-09 | End: 2020-06-10

## 2020-06-09 RX ADMIN — ENOXAPARIN SODIUM 30 MG: 30 INJECTION SUBCUTANEOUS at 17:43

## 2020-06-09 RX ADMIN — INSULIN LISPRO 2 UNITS: 100 INJECTION, SOLUTION INTRAVENOUS; SUBCUTANEOUS at 17:46

## 2020-06-09 RX ADMIN — ASPIRIN 81 MG 81 MG: 81 TABLET ORAL at 10:26

## 2020-06-09 RX ADMIN — Medication 10 ML: at 10:27

## 2020-06-09 RX ADMIN — INSULIN LISPRO 15 UNITS: 100 INJECTION, SOLUTION INTRAVENOUS; SUBCUTANEOUS at 10:18

## 2020-06-09 RX ADMIN — Medication 10 ML: at 20:10

## 2020-06-09 RX ADMIN — TRAZODONE HYDROCHLORIDE 100 MG: 50 TABLET ORAL at 20:10

## 2020-06-09 RX ADMIN — INSULIN LISPRO 2 UNITS: 100 INJECTION, SOLUTION INTRAVENOUS; SUBCUTANEOUS at 12:20

## 2020-06-09 RX ADMIN — INSULIN LISPRO 3 UNITS: 100 INJECTION, SOLUTION INTRAVENOUS; SUBCUTANEOUS at 20:10

## 2020-06-09 RX ADMIN — INSULIN LISPRO 15 UNITS: 100 INJECTION, SOLUTION INTRAVENOUS; SUBCUTANEOUS at 17:46

## 2020-06-09 RX ADMIN — CIPROFLOXACIN 400 MG: 2 INJECTION, SOLUTION INTRAVENOUS at 20:10

## 2020-06-09 RX ADMIN — TICAGRELOR 90 MG: 90 TABLET ORAL at 10:26

## 2020-06-09 RX ADMIN — CIPROFLOXACIN 400 MG: 2 INJECTION, SOLUTION INTRAVENOUS at 10:26

## 2020-06-09 RX ADMIN — INSULIN LISPRO 18 UNITS: 100 INJECTION, SOLUTION INTRAVENOUS; SUBCUTANEOUS at 12:20

## 2020-06-09 RX ADMIN — OXYCODONE HYDROCHLORIDE AND ACETAMINOPHEN 1 TABLET: 5; 325 TABLET ORAL at 17:51

## 2020-06-09 RX ADMIN — INSULIN GLARGINE 22 UNITS: 100 INJECTION, SOLUTION SUBCUTANEOUS at 12:33

## 2020-06-09 RX ADMIN — ATORVASTATIN CALCIUM 40 MG: 40 TABLET, FILM COATED ORAL at 20:10

## 2020-06-09 RX ADMIN — TICAGRELOR 90 MG: 90 TABLET ORAL at 20:10

## 2020-06-09 RX ADMIN — OXYCODONE HYDROCHLORIDE AND ACETAMINOPHEN 1 TABLET: 5; 325 TABLET ORAL at 10:43

## 2020-06-09 ASSESSMENT — PAIN DESCRIPTION - PAIN TYPE
TYPE: ACUTE PAIN
TYPE: CHRONIC PAIN
TYPE: ACUTE PAIN;CHRONIC PAIN

## 2020-06-09 ASSESSMENT — PAIN DESCRIPTION - LOCATION
LOCATION: BACK
LOCATION: HEAD
LOCATION: HEAD;BACK

## 2020-06-09 ASSESSMENT — PAIN SCALES - GENERAL
PAINLEVEL_OUTOF10: 8
PAINLEVEL_OUTOF10: 6
PAINLEVEL_OUTOF10: 6
PAINLEVEL_OUTOF10: 2

## 2020-06-09 NOTE — PROGRESS NOTES
[address]   Daily Progress Note    Admit Date:  6/5/2020  HPI:    Chief Complaint   Patient presents with    Chest Pain     pt c/o chest pain and abdominal pain that started \"this morning\"   Presented to the hospital with complaints of chest pain. Note deep inspiration makes chest pain worse. CT without PE. Troponin negative x4. Stress test with low risk abnormality, echocardiogram okay. [facility] 6/8/20 with 75% LAD/ Diagonal treated with PCI/ DAYANA X1. Developed fever overnight. Subjective:  Ms. Lico Gonzalez lying in bed. Denies any chest pain. + tenderness, warmth and redness to right AC site - prior IV infiltrate    Objective:   /68   Pulse 64   Temp 99.8 °F (37.7 °C) (Oral)   Resp 20   Ht 5' 3\" (1.6 m)   Wt 147 lb 8 oz (66.9 kg)   SpO2 98%   BMI 26.13 kg/m²       Intake/Output Summary (Last 24 hours) at 6/9/2020 1418  Last data filed at 6/9/2020 1339  Gross per 24 hour   Intake 1050 ml   Output 900 ml   Net 150 ml     Wt Readings from Last 3 Encounters:   06/09/20 147 lb 8 oz (66.9 kg)   04/22/20 163 lb (73.9 kg)   03/06/20 167 lb (75.8 kg)         ASSESSMENT:   1. Chest pain - no ACS  2. CAD - s/p PCI to diagonal, on dual antiplatelet therapy, statin; no BB given bradycardia  3. HTN - stable  4. HLD - LDL 34, on statin  5. PAD - + carotid disease  6. DM  7. Hx nephrectomy - renal panel stable       PLAN:  1. Continue dual antiplatelet therapy, statin  2. No BB given bradycardia with single dose  3. Fever with inflammation at infiltrated IV site - will defer to IM  4. Okay for discharge from cardiac perspective. Will review cardiac medications on discharge medication reconciliation form. Patient has follow up appointment with Bridgette Garcia in 2 weeks.       SARTHAK Euceda CNP, 6/9/2020, 2:18 PM  [address]   [phone]       Telemetry: SR 60-70's  NYHA: III    Physical Exam:  General:  Awake, alert, NAD  Skin:  Warm and dry  Neck:  JVP 8 cm  Chest:  Clear to auscultation Cardiovascular:  RRR, normal S1S2, no m/g/r  Abdomen:  Soft, nontender, +bowel sounds  Extremities:  No BLE edema  right radial site without ooze, bruise or hematoma, dressing C,D,I, 2+ pulse      Medications:    enoxaparin  30 mg Subcutaneous Daily    ciprofloxacin  400 mg Intravenous Q12H    [START ON 6/10/2020] glimepiride  4 mg Oral Daily with breakfast    insulin glargine  22 Units Subcutaneous QAM    insulin lispro  2 Units Subcutaneous TID WC    ticagrelor  90 mg Oral BID    insulin lispro  0-18 Units Subcutaneous TID WC    insulin lispro  0-9 Units Subcutaneous Nightly    sodium chloride flush  10 mL Intravenous 2 times per day    atorvastatin  40 mg Oral Nightly    aspirin  81 mg Oral Daily    traZODone  100 mg Oral Nightly      dextrose         Lab Data: Lab results independently reviewed by myself 6/9/20   CBC:   Recent Labs     06/09/20  0614   WBC 4.8   HGB 9.4*        BMP:    Recent Labs     06/08/20  0726 06/09/20  0614    139   K 4.2 3.8  3.8   CO2 22 21   BUN 19 22*   CREATININE 0.8 1.2     INR:  No results for input(s): INR in the last 72 hours. BNP:  No results for input(s): PROBNP in the last 72 hours. Cardiac Enzymes: No results for input(s): TROPONINI in the last 72 hours. Lipids:   Lab Results   Component Value Date    TRIG 91 06/08/2020    TRIG 130 01/18/2019    HDL 30 06/08/2020    HDL 45 01/18/2019    LDLCALC 34 06/08/2020    LDLCALC 45 01/18/2019       Cardiac Imaging:    LEFT HEART CATH  LM: ostial 15%  LAD: mid 20%, distal short myocardial bridge then long segment 40% stenosis. Diag 1: prox 75%  LCX: luminals                OM1- prox 15% eccentric  RCA: dominant   LVEDP:9  LVEF: 65%, normal wall motion   PCI of Prox Diag 1  STENT:resolute 2 x 15 post dilated to 2.3mm   Assessment  1. Successful PCi to Diag 1. CP resolved                Residual 10%, SHIN-3  2. COnt integrillin for 12 hr  3. ASA 81mg qday, Ticagrelor 90mg po BID  4.  Statin

## 2020-06-09 NOTE — PROGRESS NOTES
/75   Pulse 73   Temp 99.6 °F (37.6 °C) (Oral)   Resp 20   Ht 5' 3\" (1.6 m)   Wt 147 lb 8 oz (66.9 kg)   SpO2 99%   BMI 26.13 kg/m²  on room air. Pt complains of headache \"6\" and chronic back pain \"4\", writer will administer prn percocet per pt request.  Pt denies shortness of breath. Lungs diminished. NSR on tele. R radial cath site WNL; no swelling, bleeding, drainage or hematoma. Site soft, tender, pt with full sensation to extremity, pulse +2. Pt denies any other needs at this time. Bedside table and call light within reach. Bed alarm activated on bed. Will continue to monitor.   Chauncey Hale  6/9/2020

## 2020-06-09 NOTE — PROGRESS NOTES
without any focal sensory/motor deficits. Cranial nerves: II-XII intact, grossly non-focal.  Psychiatric: Alert and oriented, thought content appropriate, normal insight  Capillary Refill: Brisk,< 3 seconds   Peripheral Pulses: +2 palpable, equal bilaterally        Labs:   Recent Labs     06/09/20  0614   WBC 4.8   HGB 9.4*   HCT 29.1*        Recent Labs     06/08/20  0726 06/09/20  0614    139   K 4.2 3.8  3.8    106   CO2 22 21   BUN 19 22*   CREATININE 0.8 1.2   CALCIUM 9.1 9.0     No results for input(s): AST, ALT, BILIDIR, BILITOT, ALKPHOS in the last 72 hours. No results for input(s): INR in the last 72 hours. No results for input(s): Gosia Newer in the last 72 hours. Urinalysis:      Lab Results   Component Value Date    NITRU POSITIVE 06/09/2020    WBCUA 10-20 06/09/2020    BACTERIA 1+ 06/09/2020    RBCUA 0-2 06/09/2020    BLOODU TRACE-INTACT 06/09/2020    SPECGRAV 1.015 06/09/2020    GLUCOSEU >=1000 06/09/2020    GLUCOSEU >=1000 mg/dL 06/07/2010       Radiology:  NM Cardiac Stress Test Nuclear Imaging   Final Result      CT CHEST PULMONARY EMBOLISM W CONTRAST   Final Result   1. No acute abnormality. 2. 3 mm solid right upper lobe pulmonary nodule bears attention on the next   imaging cycle. XR CHEST PORTABLE   Preliminary Result   Mild bibasilar atelectasis. Assessment/Plan:    Active Hospital Problems    Diagnosis    Chest pain [R07.9]     1.  Chest pain -due to unstable angina  -Initial EKG nonischemic, troponin negative.  Patient had recurrence of symptoms in ED for which cardiology was consulted .  Recommended admission for further stress testing for risk stratification.  NM stress test and echocardiogram ordered by cardiology.    NM stress test on 6/6/2020 showed myocardial scar.  Echo showed normal LVEF 55%.  No R WMA.   -CTPA negative for pulmonary embolism on admission  -Continue aspirin, nitro PRN, home dose of Percocet PRN for pain control.  Cardiology added beta-blocker this admit  -Telemetry without arrhythmias ;troponi x 3  negative; checked FLP   -Given patient's ongoing intermittent symptoms -cardiology recommended LHC, s/p 6/8/2020(stent placed in Prox Diag 1 branch )     Fever with abn UA- likely developing UTI, possibly POA  -iv cipro started 6/9  ucx pending    CAD- see above, LHC done 6/8/20  -now on asa, statin, ticagrelor  -no bb due to bradycardia    Hypertension -optimal control on home medication; continued   -no BB due to bradycardia after single dose     Type 2 diabetes mellitus -uncontrolled with last A1c 8.4% dated 12/20/2019.  Patient reports that she take Lantus and Victoza at home and follows with endocrinology as outpatient.  -Continued home regimen and monitor blood glucose.   -repeat A1c was 9    CKD 3- stable, labs monitored    Degenerative disc disease lumbar spine -patient on Percocet PRN at home; continued     History of breast cancer -currently in remission     Right upper lobe solitary pulmonary nodule - Radiology recommended attention on the next imaging cycle.       DVT Prophylaxis: lovenox  Diet: Diet NPO Time Specified  Code Status: Full Code    PT/OT Eval Status: not ordered    Dispo - pending ucx    Nan Silverio MD

## 2020-06-09 NOTE — PLAN OF CARE
Problem: Pain:  Goal: Pain level will decrease  Description: Pain level will decrease  Outcome: Met This Shift     Problem: Tissue Perfusion - Peripheral, Altered:  Goal: Absence of hematoma at arterial access site  Description: Absence of hematoma at arterial access site  Outcome: Met This Shift  Note: R radial cath site WNL; no swelling, bleeding, drainage or hematoma. Site soft, radial pulse +2. Problem: Metabolic:  Goal: Ability to maintain clinical measurements within normal limits will improve  Description: Ability to maintain clinical measurements within normal limits will improve  Outcome: Ongoing  Intervention: Evaluate blood glucose measurement  Note: Blood sugars 300-400's this shift. Pt is on high SSI with meals and at bedtime. MD added 2 units of humalog with meals, lantus 22 units every morning, and amaryl 4mg PO every morning. Accu checks ACHS. Problem: Pain:  Goal: Control of chronic pain  Description: Control of chronic pain  Note: Pt complains of acute headache pain and chronic back pain this shift, medicated with prn percocet with effective results.

## 2020-06-10 LAB
ANION GAP SERPL CALCULATED.3IONS-SCNC: 9 MMOL/L (ref 3–16)
BUN BLDV-MCNC: 21 MG/DL (ref 7–20)
CALCIUM SERPL-MCNC: 8.7 MG/DL (ref 8.3–10.6)
CHLORIDE BLD-SCNC: 104 MMOL/L (ref 99–110)
CO2: 21 MMOL/L (ref 21–32)
CREAT SERPL-MCNC: 1.1 MG/DL (ref 0.6–1.2)
GFR AFRICAN AMERICAN: >60
GFR NON-AFRICAN AMERICAN: 50
GLUCOSE BLD-MCNC: 171 MG/DL (ref 70–99)
GLUCOSE BLD-MCNC: 302 MG/DL (ref 70–99)
GLUCOSE BLD-MCNC: 312 MG/DL (ref 70–99)
GLUCOSE BLD-MCNC: 325 MG/DL (ref 70–99)
GLUCOSE BLD-MCNC: 511 MG/DL (ref 70–99)
PERFORMED ON: ABNORMAL
POTASSIUM SERPL-SCNC: 4.1 MMOL/L (ref 3.5–5.1)
SODIUM BLD-SCNC: 134 MMOL/L (ref 136–145)

## 2020-06-10 PROCEDURE — 6370000000 HC RX 637 (ALT 250 FOR IP): Performed by: INTERNAL MEDICINE

## 2020-06-10 PROCEDURE — 80048 BASIC METABOLIC PNL TOTAL CA: CPT

## 2020-06-10 PROCEDURE — 2580000003 HC RX 258: Performed by: INTERNAL MEDICINE

## 2020-06-10 PROCEDURE — 6360000002 HC RX W HCPCS: Performed by: INTERNAL MEDICINE

## 2020-06-10 PROCEDURE — 2060000000 HC ICU INTERMEDIATE R&B

## 2020-06-10 PROCEDURE — 93971 EXTREMITY STUDY: CPT

## 2020-06-10 RX ORDER — INSULIN GLARGINE 100 [IU]/ML
26 INJECTION, SOLUTION SUBCUTANEOUS EVERY MORNING
Status: DISCONTINUED | OUTPATIENT
Start: 2020-06-11 | End: 2020-06-11 | Stop reason: HOSPADM

## 2020-06-10 RX ADMIN — INSULIN LISPRO 5 UNITS: 100 INJECTION, SOLUTION INTRAVENOUS; SUBCUTANEOUS at 11:31

## 2020-06-10 RX ADMIN — INSULIN LISPRO 2 UNITS: 100 INJECTION, SOLUTION INTRAVENOUS; SUBCUTANEOUS at 07:45

## 2020-06-10 RX ADMIN — INSULIN LISPRO 6 UNITS: 100 INJECTION, SOLUTION INTRAVENOUS; SUBCUTANEOUS at 23:24

## 2020-06-10 RX ADMIN — CIPROFLOXACIN 400 MG: 2 INJECTION, SOLUTION INTRAVENOUS at 19:39

## 2020-06-10 RX ADMIN — GLIMEPIRIDE 4 MG: 2 TABLET ORAL at 07:43

## 2020-06-10 RX ADMIN — ENOXAPARIN SODIUM 30 MG: 30 INJECTION SUBCUTANEOUS at 07:44

## 2020-06-10 RX ADMIN — Medication 10 ML: at 07:58

## 2020-06-10 RX ADMIN — TICAGRELOR 90 MG: 90 TABLET ORAL at 07:43

## 2020-06-10 RX ADMIN — INSULIN LISPRO 18 UNITS: 100 INJECTION, SOLUTION INTRAVENOUS; SUBCUTANEOUS at 07:45

## 2020-06-10 RX ADMIN — INSULIN LISPRO 5 UNITS: 100 INJECTION, SOLUTION INTRAVENOUS; SUBCUTANEOUS at 16:40

## 2020-06-10 RX ADMIN — Medication 10 ML: at 19:39

## 2020-06-10 RX ADMIN — ACETAMINOPHEN 650 MG: 325 TABLET ORAL at 04:56

## 2020-06-10 RX ADMIN — OXYCODONE HYDROCHLORIDE AND ACETAMINOPHEN 1 TABLET: 5; 325 TABLET ORAL at 11:30

## 2020-06-10 RX ADMIN — INSULIN LISPRO 12 UNITS: 100 INJECTION, SOLUTION INTRAVENOUS; SUBCUTANEOUS at 11:31

## 2020-06-10 RX ADMIN — INSULIN LISPRO 3 UNITS: 100 INJECTION, SOLUTION INTRAVENOUS; SUBCUTANEOUS at 16:39

## 2020-06-10 RX ADMIN — OXYCODONE HYDROCHLORIDE AND ACETAMINOPHEN 1 TABLET: 5; 325 TABLET ORAL at 20:31

## 2020-06-10 RX ADMIN — TRAZODONE HYDROCHLORIDE 100 MG: 50 TABLET ORAL at 19:39

## 2020-06-10 RX ADMIN — CIPROFLOXACIN 400 MG: 2 INJECTION, SOLUTION INTRAVENOUS at 07:44

## 2020-06-10 RX ADMIN — INSULIN GLARGINE 22 UNITS: 100 INJECTION, SOLUTION SUBCUTANEOUS at 07:45

## 2020-06-10 RX ADMIN — ATORVASTATIN CALCIUM 40 MG: 40 TABLET, FILM COATED ORAL at 19:39

## 2020-06-10 RX ADMIN — ASPIRIN 81 MG 81 MG: 81 TABLET ORAL at 07:43

## 2020-06-10 RX ADMIN — TICAGRELOR 90 MG: 90 TABLET ORAL at 19:39

## 2020-06-10 ASSESSMENT — PAIN SCALES - GENERAL
PAINLEVEL_OUTOF10: 7
PAINLEVEL_OUTOF10: 0
PAINLEVEL_OUTOF10: 4
PAINLEVEL_OUTOF10: 7
PAINLEVEL_OUTOF10: 9

## 2020-06-10 NOTE — PROGRESS NOTES
rubs or gallops. Abdomen: Soft, non-tender, non-distended with normal bowel sounds. Musculoskeletal: No clubbing, cyanosis or edema bilaterally. Full range of motion without deformity. Skin: Skin color, texture, turgor normal.  No rashes or lesions. Neurologic:  Neurovascularly intact without any focal sensory/motor deficits. Cranial nerves: II-XII intact, grossly non-focal.  Psychiatric: Alert and oriented, thought content appropriate, normal insight  Capillary Refill: Brisk,< 3 seconds   Peripheral Pulses: +2 palpable, equal bilaterally       Labs:   Recent Labs     06/09/20  0614   WBC 4.8   HGB 9.4*   HCT 29.1*        Recent Labs     06/08/20  0726 06/09/20  0614 06/10/20  0456    139 134*   K 4.2 3.8  3.8 4.1    106 104   CO2 22 21 21   BUN 19 22* 21*   CREATININE 0.8 1.2 1.1   CALCIUM 9.1 9.0 8.7     No results for input(s): AST, ALT, BILIDIR, BILITOT, ALKPHOS in the last 72 hours. No results for input(s): INR in the last 72 hours. No results for input(s): Lella Parker in the last 72 hours. Urinalysis:      Lab Results   Component Value Date    NITRU POSITIVE 06/09/2020    WBCUA 10-20 06/09/2020    BACTERIA 1+ 06/09/2020    RBCUA 0-2 06/09/2020    BLOODU TRACE-INTACT 06/09/2020    SPECGRAV 1.015 06/09/2020    GLUCOSEU >=1000 06/09/2020    GLUCOSEU >=1000 mg/dL 06/07/2010       Radiology:  NM Cardiac Stress Test Nuclear Imaging   Final Result      CT CHEST PULMONARY EMBOLISM W CONTRAST   Final Result   1. No acute abnormality. 2. 3 mm solid right upper lobe pulmonary nodule bears attention on the next   imaging cycle. XR CHEST PORTABLE   Final Result   Mild bibasilar atelectasis.                  Assessment/Plan:    Active Hospital Problems    Diagnosis    Coronary artery disease of native artery of native heart with stable angina pectoris [facility] [I25.118]    Chest pain [R07.9]    Dyslipidemia associated with type 2 diabetes mellitus [facility] [E11.69, E78.5]    Uncontrolled type 2 diabetes mellitus with microalbuminuria, with long-term current use of insulin (HCC) [E11.29, E11.65, R80.9, Z79.4]    Essential hypertension [I10]     1.  Chest pain -due to unstable angina  -Initial EKG nonischemic, troponin negative.  Patient had recurrence of symptoms in ED for which cardiology was consulted .  Recommended admission for further stress testing for risk stratification.  NM stress test and echocardiogram ordered by cardiology.    NM stress test on 6/6/2020 showed myocardial scar.  Echo showed normal LVEF 55%.  No R WMA.   -CTPA negative for pulmonary embolism on admission  -Continue aspirin, nitro PRN, home dose of Percocet PRN for pain control.  Cardiology added beta-blocker this admit  -Telemetry without arrhythmias ;troponi x 3  negative; checked FLP   -Given patient's ongoing intermittent symptoms -cardiology recommended LHC, s/p 6/8/2020(stent placed in Prox Diag 1 branch )     Fever with abn UA- likely developing UTI, possibly POA  -iv cipro started 6/9  ucx pending     CAD- see above, LHC done 6/8/20  -now on asa, statin, ticagrelor  -no bb due to bradycardia     Hypertension -optimal control on home medication; continued   -no BB due to bradycardia after single dose     Type 2 diabetes mellitus -uncontrolled with last A1c 8.4% dated 12/20/2019.  Patient reports that she take Lantus and Victoza at home and follows with endocrinology as outpatient.  -Continued and adjusted home regimen and monitored blood glucose.   -repeat A1c was 9     CKD 3- stable, labs monitored     Degenerative disc disease lumbar spine -patient on Percocet PRN at home; continued     History of breast cancer -currently in remission     Right upper lobe solitary pulmonary nodule - Radiology recommended attention on the next imaging cycle.     Right upper arm pain/swelling- pt had recent PIV and possible infiltration  -check VL u/s    DVT Prophylaxis: lovenox  Diet: DIET CARB CONTROL; Carb Control: 3 carb choices (45 gms)/meal  Code Status: Full Code    PT/OT Eval Status: not ordered    Dispo - pending ucx, better sugar control, titrate up insulin,check VL u/s of UE    Otto Habermann, MD

## 2020-06-11 VITALS
TEMPERATURE: 99.4 F | RESPIRATION RATE: 16 BRPM | SYSTOLIC BLOOD PRESSURE: 115 MMHG | HEART RATE: 72 BPM | OXYGEN SATURATION: 96 % | HEIGHT: 63 IN | WEIGHT: 148.6 LBS | DIASTOLIC BLOOD PRESSURE: 71 MMHG | BODY MASS INDEX: 26.33 KG/M2

## 2020-06-11 LAB
ANION GAP SERPL CALCULATED.3IONS-SCNC: 8 MMOL/L (ref 3–16)
BUN BLDV-MCNC: 19 MG/DL (ref 7–20)
CALCIUM SERPL-MCNC: 8.8 MG/DL (ref 8.3–10.6)
CHLORIDE BLD-SCNC: 103 MMOL/L (ref 99–110)
CO2: 23 MMOL/L (ref 21–32)
CREAT SERPL-MCNC: 1.1 MG/DL (ref 0.6–1.2)
GFR AFRICAN AMERICAN: >60
GFR NON-AFRICAN AMERICAN: 50
GLUCOSE BLD-MCNC: 194 MG/DL (ref 70–99)
GLUCOSE BLD-MCNC: 243 MG/DL (ref 70–99)
GLUCOSE BLD-MCNC: 395 MG/DL (ref 70–99)
ORGANISM: ABNORMAL
PERFORMED ON: ABNORMAL
PERFORMED ON: ABNORMAL
POTASSIUM SERPL-SCNC: 3.8 MMOL/L (ref 3.5–5.1)
SODIUM BLD-SCNC: 134 MMOL/L (ref 136–145)
URINE CULTURE, ROUTINE: ABNORMAL

## 2020-06-11 PROCEDURE — 6370000000 HC RX 637 (ALT 250 FOR IP): Performed by: INTERNAL MEDICINE

## 2020-06-11 PROCEDURE — 2580000003 HC RX 258: Performed by: INTERNAL MEDICINE

## 2020-06-11 PROCEDURE — 80048 BASIC METABOLIC PNL TOTAL CA: CPT

## 2020-06-11 PROCEDURE — 6360000002 HC RX W HCPCS: Performed by: INTERNAL MEDICINE

## 2020-06-11 RX ORDER — SULFAMETHOXAZOLE AND TRIMETHOPRIM 800; 160 MG/1; MG/1
1 TABLET ORAL EVERY 12 HOURS SCHEDULED
Status: DISCONTINUED | OUTPATIENT
Start: 2020-06-11 | End: 2020-06-11 | Stop reason: HOSPADM

## 2020-06-11 RX ORDER — SULFAMETHOXAZOLE AND TRIMETHOPRIM 800; 160 MG/1; MG/1
1 TABLET ORAL EVERY 12 HOURS SCHEDULED
Qty: 10 TABLET | Refills: 0 | Status: SHIPPED | OUTPATIENT
Start: 2020-06-11 | End: 2020-06-16

## 2020-06-11 RX ADMIN — CIPROFLOXACIN 400 MG: 2 INJECTION, SOLUTION INTRAVENOUS at 08:31

## 2020-06-11 RX ADMIN — OXYCODONE HYDROCHLORIDE AND ACETAMINOPHEN 1 TABLET: 5; 325 TABLET ORAL at 08:30

## 2020-06-11 RX ADMIN — TICAGRELOR 90 MG: 90 TABLET ORAL at 08:30

## 2020-06-11 RX ADMIN — INSULIN LISPRO 6 UNITS: 100 INJECTION, SOLUTION INTRAVENOUS; SUBCUTANEOUS at 08:31

## 2020-06-11 RX ADMIN — INSULIN LISPRO 15 UNITS: 100 INJECTION, SOLUTION INTRAVENOUS; SUBCUTANEOUS at 11:48

## 2020-06-11 RX ADMIN — INSULIN LISPRO 5 UNITS: 100 INJECTION, SOLUTION INTRAVENOUS; SUBCUTANEOUS at 11:48

## 2020-06-11 RX ADMIN — GLIMEPIRIDE 4 MG: 2 TABLET ORAL at 08:30

## 2020-06-11 RX ADMIN — OXYCODONE HYDROCHLORIDE AND ACETAMINOPHEN 1 TABLET: 5; 325 TABLET ORAL at 14:56

## 2020-06-11 RX ADMIN — ENOXAPARIN SODIUM 30 MG: 30 INJECTION SUBCUTANEOUS at 08:30

## 2020-06-11 RX ADMIN — INSULIN LISPRO 5 UNITS: 100 INJECTION, SOLUTION INTRAVENOUS; SUBCUTANEOUS at 08:32

## 2020-06-11 RX ADMIN — INSULIN GLARGINE 26 UNITS: 100 INJECTION, SOLUTION SUBCUTANEOUS at 08:35

## 2020-06-11 RX ADMIN — ASPIRIN 81 MG 81 MG: 81 TABLET ORAL at 08:30

## 2020-06-11 RX ADMIN — SULFAMETHOXAZOLE AND TRIMETHOPRIM 1 TABLET: 800; 160 TABLET ORAL at 10:58

## 2020-06-11 RX ADMIN — Medication 10 ML: at 08:31

## 2020-06-11 ASSESSMENT — PAIN SCALES - GENERAL
PAINLEVEL_OUTOF10: 5
PAINLEVEL_OUTOF10: 7
PAINLEVEL_OUTOF10: 5

## 2020-06-11 ASSESSMENT — PAIN DESCRIPTION - LOCATION: LOCATION: ARM

## 2020-06-11 ASSESSMENT — PAIN DESCRIPTION - PAIN TYPE: TYPE: ACUTE PAIN

## 2020-06-11 ASSESSMENT — PAIN DESCRIPTION - ORIENTATION: ORIENTATION: RIGHT

## 2020-06-11 NOTE — DISCHARGE SUMMARY
Hospital Medicine Discharge Summary    Patient ID: Nik Beaver      Patient's PCP: [place]    Admit Date: 6/5/2020     Discharge Date: 6/11/2020      Admitting Physician: Sandro Silva MD     Discharge Physician: Naty Gasca MD     Discharge Diagnoses: Active Hospital Problems    Diagnosis    Coronary artery disease of native artery of native heart with stable angina pectoris ([facility] [address] [I25.118]    Chest pain [R07.9]    Dyslipidemia associated with type 2 diabetes mellitus ([facility] [address] [E11.69, E78.5]    Uncontrolled type 2 diabetes mellitus with microalbuminuria, with long-term current use of insulin (HCC) [E11.29, E11.65, R80.9, Z79.4]    Essential hypertension [I10]       The patient was seen and examined on day of discharge and this discharge summary is in conjunction with any daily progress note from day of discharge. Hospital Course:   History Of Present Illness:    63 y.o. female PMH of hypertension, type 2 diabetes mellitus, scoliosis with arthritis  presented to [facility] ED with complaints of substernal chest pain which started about 8:50 AM this morning when she was in her restroom having bowel movement. Patient reports that she initially felt that it was\" gas pain\" but it persisted and radiating to right side of her chest into the back. Patient rated the pain about 7-10 out of 10 in  Intensity, no associated shortness of breath, cough, fever, chills, nausea, vomiting. Patient reports having diarrhea at baseline. She denies smoking, drinking, illicit drug use. Has history of GERD but reports that has not flared in years. Since the chest pain continued she decided to come to ED for further evaluation and management.     ED course : Patient noted to be hemodynamically stable upon arrival to ED. ED physician reports en route to the hospital patient received 4 chewable aspirin along with nitro patch which improved her symptoms.   Patient had another couple of episodes of recurrence of chest pain while in ED which subsided with nitro. Cardiology was consulted from ED-evaluated and recommended admission for further evaluation and management. Echocardiogram and NM stress test was ordered by cardiology. Hospitalist consulted for admission for further evaluation and management     Upon evaluation by me on A 2, patient reports that her chest pain is coming back. Offers no new complaints. Discussed plan of care with patient.        1.  Chest pain -due to unstable angina  -Initial EKG nonischemic, troponin negative.  Patient had recurrence of symptoms in ED for which cardiology was consulted .  Recommended admission for further stress testing for risk stratification.  NM stress test and echocardiogram ordered by cardiology.    NM stress test on 6/6/2020 showed myocardial scar.  Echo showed normal LVEF 55%.  No R WMA.   -CTPA negative for pulmonary embolism on admission  -Continue aspirin, nitro PRN, home dose of Percocet PRN for pain control.  Cardiology added beta-blocker this admit  -Telemetry without arrhythmias ;troponi x 3  negative; checked FLP   -Given patient's ongoing intermittent symptoms -cardiology recommended LHC, s/p 6/8/2020(stent placed in Prox Diag 1 branch )     Fever with abn UA- likely developing UTI, possibly POA  -ucx noted ecoli resistant to cipro, pt had pcn allergy as well  -iv cipro initially started 6/9, switched to bactrim on dc     CAD- see above, LHC done 6/8/20  -now on asa, statin, ticagrelor  -no bb due to bradycardia     Hypertension -optimal control on home medication; continued   -no BB due to bradycardia after single dose     Type 2 diabetes mellitus -uncontrolled with last A1c 8.4% dated 12/20/2019.  Patient reports that she take Lantus and Victoza at home and follows with endocrinology as outpatient.  -Continued and adjusted home regimen and monitored blood glucose.   -repeat A1c was 9     CKD 3- stable, labs Results   Component Value Date    WBC 4.8 06/09/2020    HGB 9.4 06/09/2020    HCT 29.1 06/09/2020     06/09/2020       Renal:    Lab Results   Component Value Date     06/11/2020    K 3.8 06/11/2020    K 3.8 06/09/2020     06/11/2020    CO2 23 06/11/2020    BUN 19 06/11/2020    CREATININE 1.1 06/11/2020    CALCIUM 8.8 06/11/2020    PHOS 4.5 04/22/2020         Significant Diagnostic Studies    Radiology:   VL Extremity Venous Right   Final Result      NM Cardiac Stress Test Nuclear Imaging   Final Result      CT CHEST PULMONARY EMBOLISM W CONTRAST   Final Result   1. No acute abnormality. 2. 3 mm solid right upper lobe pulmonary nodule bears attention on the next   imaging cycle. XR CHEST PORTABLE   Final Result   Mild bibasilar atelectasis.                 Consults:     IP CONSULT TO CARDIOLOGY  IP CONSULT TO HOSPITALIST  IP CONSULT TO SPIRITUAL SERVICES  IP CONSULT TO CARDIAC REHAB    Disposition:  home     Condition at Discharge: Stable    Discharge Instructions/Follow-up:  PCP as outpt, cards as outpt, continue warm compresses/elevation to right arm    Code Status:  FULL    Activity: activity as tolerated    Diet: diabetic diet      Discharge Medications:     Discharge Medication List as of 6/11/2020  3:42 PM           Details   sulfamethoxazole-trimethoprim (BACTRIM DS;SEPTRA DS) 800-160 MG per tablet Take 1 tablet by mouth every 12 hours for 5 days, Disp-10 tablet, R-0Normal      ticagrelor (BRILINTA) 90 MG TABS tablet Take 1 tablet by mouth 2 times daily, Disp-60 tablet, R-11Normal              Details   VICTOZA 18 MG/3ML SOPN SC injection INJECT 1.8 MG UNDER THE SKIN ONCE DAILY, Disp-3 pen, R-5Normal      Blood Pressure Monitor GINA Disp-1 Device, R-0, NormalCheck BP at home once or twice a day      glimepiride (AMARYL) 4 MG tablet TAKE 1 TABLET BY MOUTH EVERY DAY BEFORE BREAKFAST, Disp-30 tablet, R-2Normal      insulin glargine (LANTUS SOLOSTAR) 100 UNIT/ML injection pen Inject 22

## 2020-06-11 NOTE — PLAN OF CARE
Nutrition Problem: Unintended weight loss  Intervention: Food and/or Nutrient Delivery: Continue current diet  Nutritional Goals:  Tolerate diet and consume greater than 50% of meals this admission with controlled BG

## 2020-06-25 ENCOUNTER — OFFICE VISIT (OUTPATIENT)
Dept: ENDOCRINOLOGY | Age: 60
End: 2020-06-25
Payer: MEDICAID

## 2020-06-25 VITALS
OXYGEN SATURATION: 100 % | BODY MASS INDEX: 26.22 KG/M2 | DIASTOLIC BLOOD PRESSURE: 65 MMHG | TEMPERATURE: 97.2 F | HEART RATE: 76 BPM | SYSTOLIC BLOOD PRESSURE: 125 MMHG | HEIGHT: 63 IN | WEIGHT: 148 LBS

## 2020-06-25 DIAGNOSIS — E11.59 TYPE 2 DIABETES MELLITUS WITH VASCULAR DISEASE (HCC): ICD-10-CM

## 2020-06-25 PROCEDURE — G8427 DOCREV CUR MEDS BY ELIG CLIN: HCPCS | Performed by: INTERNAL MEDICINE

## 2020-06-25 PROCEDURE — 2022F DILAT RTA XM EVC RTNOPTHY: CPT | Performed by: INTERNAL MEDICINE

## 2020-06-25 PROCEDURE — 3017F COLORECTAL CA SCREEN DOC REV: CPT | Performed by: INTERNAL MEDICINE

## 2020-06-25 PROCEDURE — 1036F TOBACCO NON-USER: CPT | Performed by: INTERNAL MEDICINE

## 2020-06-25 PROCEDURE — G8417 CALC BMI ABV UP PARAM F/U: HCPCS | Performed by: INTERNAL MEDICINE

## 2020-06-25 PROCEDURE — 1111F DSCHRG MED/CURRENT MED MERGE: CPT | Performed by: INTERNAL MEDICINE

## 2020-06-25 PROCEDURE — 3052F HG A1C>EQUAL 8.0%<EQUAL 9.0%: CPT | Performed by: INTERNAL MEDICINE

## 2020-06-25 PROCEDURE — 99214 OFFICE O/P EST MOD 30 MIN: CPT | Performed by: INTERNAL MEDICINE

## 2020-06-25 RX ORDER — LISINOPRIL 10 MG/1
10 TABLET ORAL DAILY
COMMUNITY
End: 2021-04-12

## 2020-06-25 RX ORDER — SIMVASTATIN 20 MG
20 TABLET ORAL NIGHTLY
COMMUNITY
End: 2021-03-09

## 2020-06-25 RX ORDER — CETIRIZINE HYDROCHLORIDE 10 MG/1
10 TABLET ORAL DAILY
Status: ON HOLD | COMMUNITY
End: 2021-08-11 | Stop reason: SDUPTHER

## 2020-06-25 RX ORDER — CLOPIDOGREL BISULFATE 75 MG/1
75 TABLET ORAL DAILY
COMMUNITY
End: 2020-06-25

## 2020-06-25 RX ORDER — LIRAGLUTIDE 6 MG/ML
1.2 INJECTION SUBCUTANEOUS DAILY
COMMUNITY
End: 2020-08-06 | Stop reason: SDUPTHER

## 2020-06-25 NOTE — PROGRESS NOTES
Patient ID:   Dyllan Plunkett is a 63 y.o. female  Chief Complaint:   Dyllan Plunkett presents for an evaluation of Type 2 Diabetes Mellitus , Hyperlipidemia and hypertension. Subjective:   Type 2 Diabetes Mellitus diagnosed around 2010  On insulin since 2012  Previous regimen:Taking Admelog 15 units tidac and 5 units for snacks. Basaglar 40 units once a day at night. VGO stopped due to hypoglycemias     Humalog stopped as she was missing lantus dose with Humalogs     She brought in her meds today. She is losing weight. Lantus 24 units daily in am, Basaglar 5 units in am  Amaryl 4 mg with first meal of the day. Last  Feb 2020. She says she has it and takes it in am.   Jardiance 25 mg daily  Metformin 1000mg bid . Good adherence   Victoza 1.8mg in am. Last  Dec 2019. She has left overs. Admits making poor dietary choices, trying to cut down fried. Multiple cancellations/no shows        Checks blood sugars 0-1 times per day. Reviewed , > 400   AM:     Lunch:   Supper:    HS:        Hypoglycemias: Once in last 4 weeks      Meals: 3, dinner is big. Snacks (jellos, fruits, pretzels) after every meal because she is hungry. Exercise: None    Denies chest pain, exertional dyspnea. Family history of CAD: Both parents  Denies smoking/ alcohol.    Currently on  mg daily     The following portions of the patient's history were reviewed and updated as appropriate:       Family History   Problem Relation Age of Onset    Cancer Mother         breast    Cancer Father     Heart Failure Neg Hx     High Cholesterol Neg Hx     Hypertension Neg Hx     Migraines Neg Hx     Rashes/Skin Problems Neg Hx     Seizures Neg Hx     Stroke Neg Hx     Thyroid Disease Neg Hx     Diabetes Neg Hx          Social History     Socioeconomic History    Marital status:      Spouse name: Not on file    Number of children: Not on file    Years of education: Not on file    Highest education level: Not on file   Occupational History    Occupation:    Social Needs    Financial resource strain: Not on file    Food insecurity     Worry: Not on file     Inability: Not on file    Transportation needs     Medical: Not on file     Non-medical: Not on file   Tobacco Use    Smoking status: Former Smoker     Packs/day: 0.50     Years: 20.00     Pack years: 10.00     Types: Cigarettes, Cigars     Last attempt to quit: 7/3/2014     Years since quittin.9    Smokeless tobacco: Never Used   Substance and Sexual Activity    Alcohol use: No     Alcohol/week: 0.0 standard drinks    Drug use: No    Sexual activity: Never   Lifestyle    Physical activity     Days per week: Not on file     Minutes per session: Not on file    Stress: Not on file   Relationships    Social connections     Talks on phone: Not on file     Gets together: Not on file     Attends Gnosticist service: Not on file     Active member of club or organization: Not on file     Attends meetings of clubs or organizations: Not on file     Relationship status: Not on file    Intimate partner violence     Fear of current or ex partner: Not on file     Emotionally abused: Not on file     Physically abused: Not on file     Forced sexual activity: Not on file   Other Topics Concern    Not on file   Social History Narrative    Not on file       Past Medical History:   Diagnosis Date    Abnormal brain MRI 2017    Partially empty sella and minimal chronic small vessel ischemic disease    Acute bilateral low back pain without sciatica 2016    SHARRON (acute kidney injury) ([facility] [address] 2017    Arthritis     back    Bipolar disorder ([facility] [address] 10/18/2008    CAD (coronary artery disease)     stent placed 20    Cancer ([facility] [address]     bilateral breast:s/p lumpectomy/radiation:under care care of breast specialist:Dr. Boone     Carotid stenosis, bilateral:<50%:per US 2016 7/15/2016    Carpal tunnel syndrome 10/18/2008    Cervical cancer screening 2014    Nml per pt'.  Coronary artery disease of native artery of native heart with stable angina pectoris ([facility] [address] 6/9/2020    DDD (degenerative disc disease), lumbar 7/18/2018    Depression     under care of pschiatrist:Dr. Gary Fisher    Depression/anxiety 7/5/2017    Depression/anxiety     Diabetes mellitus (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 [address] 9/17/2017    Microalbuminuria 7/1/2016    Neuropathy in diabetes ([facility] [address]     Non morbid obesity 7/1/2016    Pancreatitis 5/12/16    [facility] hospitalization 5/12/16-5/16/16:under care of GI:chronic pancreatitis    S/P endoscopy 6/14/2016    [facility]:per pt' & her family member was nml.     Scoliosis     Spondylosis of lumbar region without myelopathy or radiculopathy 3/10/2017    Transient cerebral ischemia 07/15/2016    TIA:7/10/16    Unspecified cerebral artery occlusion with cerebral infarction     TIA       Past Surgical History:   Procedure Laterality Date    BREAST LUMPECTOMY  2015    Bilateral:breast cancer    CARDIAC CATHETERIZATION  06/08/2020    Dr. Denton Lang), DAYANA to Diag 1    HYSTERECTOMY      Benign:no cervical cancer per pt'    KIDNEY REMOVAL      right    OTHER SURGICAL HISTORY Right     orif right ankle    TUBAL LIGATION           Allergies   Allergen Reactions    Morphine Anaphylaxis and Hives     feels like throat is closing    Penicillins Hives and Swelling    Codeine Hives and Rash    Penicillin G Rash         Current Outpatient Medications:     simvastatin (ZOCOR) 20 MG tablet, Take 20 mg by mouth nightly, Disp: , Rfl:     cetirizine (ZYRTEC) 10 MG tablet, Take 10 mg by mouth daily, Disp: , Rfl:     lisinopril (PRINIVIL;ZESTRIL) 10 MG tablet, Take 10 mg by mouth daily, Disp: , Rfl:     Liraglutide Sodium 04/22/2020 142  136 - 145 mmol/L Final    Potassium 04/22/2020 3.7  3.5 - 5.1 mmol/L Final    Chloride 04/22/2020 108  99 - 110 mmol/L Final    CO2 04/22/2020 23  21 - 32 mmol/L Final    Anion Gap 04/22/2020 11  3 - 16 Final    Glucose 04/22/2020 58* 70 - 99 mg/dL Final    BUN 04/22/2020 21* 7 - 20 mg/dL Final    CREATININE 04/22/2020 1.2  0.6 - 1.2 mg/dL Final    GFR Non- 04/22/2020 46* >60 Final    GFR  04/22/2020 55* >60 Final    Calcium 04/22/2020 8.6  8.3 - 10.6 mg/dL Final    POC Glucose 04/22/2020 57* 70 - 99 mg/dl Final    Performed on 04/22/2020 ACCU-CHEK   Final    POC Glucose 04/22/2020 55* 70 - 99 mg/dl Final    Performed on 04/22/2020 ACCU-CHEK   Final    POC Glucose 04/22/2020 103* 70 - 99 mg/dl Final    Performed on 04/22/2020 ACCU-CHEK   Final   Admission on 01/22/2020, Discharged on 01/23/2020   Component Date Value Ref Range Status    POC Glucose 01/22/2020 >600* 70 - 99 mg/dl Final    Performed on 01/22/2020 ACCU-CHEK   Final    WBC 01/22/2020 6.0  4.0 - 11.0 K/uL Final    RBC 01/22/2020 4.11  4.00 - 5.20 M/uL Final    Hemoglobin 01/22/2020 11.3* 12.0 - 16.0 g/dL Final    Hematocrit 01/22/2020 35.6* 36.0 - 48.0 % Final    MCV 01/22/2020 86.4  80.0 - 100.0 fL Final    MCH 01/22/2020 27.4  26.0 - 34.0 pg Final    MCHC 01/22/2020 31.7  31.0 - 36.0 g/dL Final    RDW 01/22/2020 13.2  12.4 - 15.4 % Final    Platelets 39/61/0974 156  135 - 450 K/uL Final    MPV 01/22/2020 10.6* 5.0 - 10.5 fL Final    Neutrophils % 01/22/2020 65.1  % Final    Lymphocytes % 01/22/2020 28.9  % Final    Monocytes % 01/22/2020 5.2  % Final    Eosinophils % 01/22/2020 0.6  % Final    Basophils % 01/22/2020 0.2  % Final    Neutrophils Absolute 01/22/2020 3.9  1.7 - 7.7 K/uL Final    Lymphocytes Absolute 01/22/2020 1.7  1.0 - 5.1 K/uL Final    Monocytes Absolute 01/22/2020 0.3  0.0 - 1.3 K/uL Final    Eosinophils Absolute 01/22/2020 0.0  0.0 - 0.6 K/uL <=1.005  1.005 - 1.030 Final    Blood, Urine 01/22/2020 Negative  Negative Final    pH, UA 01/22/2020 5.0  5.0 - 8.0 Final    Protein, UA 01/22/2020 Negative  Negative mg/dL Final    Urobilinogen, Urine 01/22/2020 0.2  <2.0 E.U./dL Final    Nitrite, Urine 01/22/2020 POSITIVE* Negative Final    Leukocyte Esterase, Urine 01/22/2020 Negative  Negative Final    Microscopic Examination 01/22/2020 YES   Final    Urine Type 01/22/2020 NotGiven   Final    Glucose 01/22/2020 529  mg/dL Final    Lactic Acid, Sepsis 01/22/2020 2.8* 0.4 - 1.9 mmol/L Final    pH, Kyle 01/22/2020 7.279* 7.350 - 7.450 Final    pCO2, Kyle 01/22/2020 54.2* 40.0 - 50.0 mmHg Final    pO2, Kyle 01/22/2020 47.1* 25.0 - 40.0 mmHg Final    HCO3, Venous 01/22/2020 24.8  23.0 - 29.0 mmol/L Final    Base Excess, Kyle 01/22/2020 -2.5  -3.0 - 3.0 mmol/L Final    O2 Sat, [facility] 01/22/2020 81  Not Established % Final    Carboxyhemoglobin 01/22/2020 1.8* 0.0 - 1.5 % Final    MetHgb, Kyle 01/22/2020 0.7  <1.5 % Final    TC02 (Calc), Kyle 01/22/2020 27  Not Established mmol/L Final    O2 Content, Kyle 01/22/2020 14  Not Established VOL % Final    O2 Therapy 01/22/2020 Unknown   Final    WBC, UA 01/22/2020 6-10* 0 - 5 /HPF Final    RBC, UA 01/22/2020 None seen  0 - 2 /HPF Final    Bacteria, UA 01/22/2020 1+* /HPF Final    POC Glucose 01/22/2020 529* 70 - 99 mg/dl Final    Performed on 01/22/2020 ACCU-CHEK   Final    Sodium 01/22/2020 126* 136 - 145 mmol/L Final    Potassium 01/22/2020 4.2  3.5 - 5.1 mmol/L Final    Chloride 01/22/2020 95* 99 - 110 mmol/L Final    CO2 01/22/2020 23  21 - 32 mmol/L Final    Anion Gap 01/22/2020 8  3 - 16 Final    Glucose 01/22/2020 607* 70 - 99 mg/dL Final    BUN 01/22/2020 15  7 - 20 mg/dL Final    CREATININE 01/22/2020 1.2  0.6 - 1.2 mg/dL Final    GFR Non- 01/22/2020 46* >60 Final    GFR  01/22/2020 55* >60 Final    Calcium 01/22/2020 8.3  8.3 - 10.6 mg/dL Final    Lactic 01/23/2020 2.6  1.0 - 5.1 K/uL Final    Monocytes Absolute 01/23/2020 0.4  0.0 - 1.3 K/uL Final    Eosinophils Absolute 01/23/2020 0.1  0.0 - 0.6 K/uL Final    Basophils Absolute 01/23/2020 0.0  0.0 - 0.2 K/uL Final    Organism 01/23/2020 Escherichia coli*  Final    Urine Culture, Routine 01/23/2020 >100,000 CFU/ml   Final    POC Glucose 01/23/2020 131* 70 - 99 mg/dl Final    Performed on 01/23/2020 ACCU-CHEK   Final    POC Glucose 01/23/2020 76  70 - 99 mg/dl Final    Performed on 01/23/2020 ACCU-CHEK   Final    POC Glucose 01/23/2020 108* 70 - 99 mg/dl Final    Performed on 01/23/2020 ACCU-CHEK   Final    Magnesium 01/23/2020 1.60* 1.80 - 2.40 mg/dL Final    POC Glucose 01/23/2020 362* 70 - 99 mg/dl Final    Performed on 01/23/2020 ACCU-CHEK   Final    POC Glucose 01/23/2020 228* 70 - 99 mg/dl Final    Performed on 01/23/2020 ACCU-CHEK   Final   Orders Only on 12/20/2019   Component Date Value Ref Range Status    Hemoglobin A1C 12/20/2019 8.4  See comment % Final    eAG 12/20/2019 194.4  mg/dL Final   Admission on 07/22/2019, Discharged on 07/23/2019   Component Date Value Ref Range Status    Sed Rate 07/22/2019 42* 0 - 30 mm/Hr Final    WBC 07/22/2019 8.1  4.0 - 11.0 K/uL Final    RBC 07/22/2019 3.73* 4.00 - 5.20 M/uL Final    Hemoglobin 07/22/2019 10.3* 12.0 - 16.0 g/dL Final    Hematocrit 07/22/2019 32.9* 36.0 - 48.0 % Final    MCV 07/22/2019 88.2  80.0 - 100.0 fL Final    MCH 07/22/2019 27.7  26.0 - 34.0 pg Final    MCHC 07/22/2019 31.3  31.0 - 36.0 g/dL Final    RDW 07/22/2019 14.3  12.4 - 15.4 % Final    Platelets 09/01/3009 183  135 - 450 K/uL Final    MPV 07/22/2019 8.4  5.0 - 10.5 fL Final    Neutrophils % 07/22/2019 57.9  % Final    Lymphocytes % 07/22/2019 33.7  % Final    Monocytes % 07/22/2019 7.1  % Final    Eosinophils % 07/22/2019 0.9  % Final    Basophils % 07/22/2019 0.4  % Final    Neutrophils Absolute 07/22/2019 4.7  1.7 - 7.7 K/uL Final    Lymphocytes off     Stop Basaglar     Stop Amaryl she picked on May 29th and still has 23 pills left. Used only 7 tabs in last 25 days. Stop Metformin and Jardiance and replace with Synjadry 12.5-1000mg, two daily in am  It will help reduce number of pills     C/w Lantus 24 units daily in am    C/w Victoza 1.8mg in am     All instructions provided in written. Check Blood sugars 4 times per day. Log them along with insulin and send them every 2 weeks. Call for blood sugars less than 60 or more than 400. Eye exam: Last exam in March 12th 2018, Needs an exam now. Foot exam:  March 2020    Deformity/amputation: absent  Skin lesions/pre-ulcerative calluses: absent  Edema: right- negative, left- negative  Sensory exam: Monofilament sensation: normal  Plus at least one of the following:   Pulses: normal,   Vibration (128 Hz): Moderately decreased     Renal screen: High 68 Feb 2018, high 47 Jan 2019      TSH screen: Feb 2018   Saw CDE in 2016 with Rd.     2: HTN   Controlled     3: Hyperlipidemia   LDL: 45 , HDL: 45 , TGs: 130 Jan 2019     Leg pains present   Continue simvastatin 20mg daily     4: Unintentional weigh tloss   Check TFTs   Follow up with pcp     RTC in 6 weeks with logs    Labs today:  (TSH, A1C, lipids MACR, CMP)     EDUCATION:   Greater than 50% of this follow-up visit was spent in general counseling regarding   obesity, diet, exercise, importance of adherence to insulin regime, recognition and treatment of hypo and hyperglycemia,  glucose logging, proper diabetes management, diabetic complications with poor management and the importance of glycemic control in order to avoid the complications of diabetes. Risks and potential complications of diabetes were reviewed with the patient. Diabetes health maintenance plan and follow-up were discussed and understood by the patient. We reviewed the importance of medication compliance and regular follow-up. Aggressive lifestyle modification was encouraged.

## 2020-06-26 LAB
A/G RATIO: 1.3 (ref 1.1–2.2)
ALBUMIN SERPL-MCNC: 4.2 G/DL (ref 3.4–5)
ALP BLD-CCNC: 178 U/L (ref 40–129)
ALT SERPL-CCNC: 32 U/L (ref 10–40)
ANION GAP SERPL CALCULATED.3IONS-SCNC: 16 MMOL/L (ref 3–16)
AST SERPL-CCNC: 21 U/L (ref 15–37)
BILIRUB SERPL-MCNC: <0.2 MG/DL (ref 0–1)
BUN BLDV-MCNC: 20 MG/DL (ref 7–20)
CALCIUM SERPL-MCNC: 9.2 MG/DL (ref 8.3–10.6)
CHLORIDE BLD-SCNC: 103 MMOL/L (ref 99–110)
CHOLESTEROL, TOTAL: 96 MG/DL (ref 0–199)
CO2: 19 MMOL/L (ref 21–32)
CREAT SERPL-MCNC: 1.2 MG/DL (ref 0.6–1.2)
CREATININE URINE: 75 MG/DL (ref 28–259)
ESTIMATED AVERAGE GLUCOSE: 162.8 MG/DL
GFR AFRICAN AMERICAN: 55
GFR NON-AFRICAN AMERICAN: 46
GLOBULIN: 3.3 G/DL
GLUCOSE BLD-MCNC: 271 MG/DL (ref 70–99)
HBA1C MFR BLD: 7.3 %
HDLC SERPL-MCNC: 39 MG/DL (ref 40–60)
LDL CHOLESTEROL CALCULATED: 33 MG/DL
MICROALBUMIN UR-MCNC: 3 MG/DL
MICROALBUMIN/CREAT UR-RTO: 40 MG/G (ref 0–30)
POTASSIUM SERPL-SCNC: 4.8 MMOL/L (ref 3.5–5.1)
SODIUM BLD-SCNC: 138 MMOL/L (ref 136–145)
TOTAL PROTEIN: 7.5 G/DL (ref 6.4–8.2)
TRIGL SERPL-MCNC: 118 MG/DL (ref 0–150)
TSH REFLEX FT4: 0.78 UIU/ML (ref 0.27–4.2)
VLDLC SERPL CALC-MCNC: 24 MG/DL

## 2020-06-29 ENCOUNTER — OFFICE VISIT (OUTPATIENT)
Dept: CARDIOLOGY CLINIC | Age: 60
End: 2020-06-29
Payer: MEDICAID

## 2020-06-29 VITALS
SYSTOLIC BLOOD PRESSURE: 102 MMHG | TEMPERATURE: 98.1 F | HEART RATE: 86 BPM | HEIGHT: 63 IN | OXYGEN SATURATION: 99 % | WEIGHT: 145 LBS | DIASTOLIC BLOOD PRESSURE: 60 MMHG | BODY MASS INDEX: 25.69 KG/M2

## 2020-06-29 PROBLEM — I25.10 CORONARY ARTERY DISEASE INVOLVING NATIVE CORONARY ARTERY OF NATIVE HEART WITHOUT ANGINA PECTORIS: Status: ACTIVE | Noted: 2020-06-09

## 2020-06-29 PROCEDURE — G8417 CALC BMI ABV UP PARAM F/U: HCPCS | Performed by: NURSE PRACTITIONER

## 2020-06-29 PROCEDURE — 3017F COLORECTAL CA SCREEN DOC REV: CPT | Performed by: NURSE PRACTITIONER

## 2020-06-29 PROCEDURE — 1111F DSCHRG MED/CURRENT MED MERGE: CPT | Performed by: NURSE PRACTITIONER

## 2020-06-29 PROCEDURE — 99214 OFFICE O/P EST MOD 30 MIN: CPT | Performed by: NURSE PRACTITIONER

## 2020-06-29 PROCEDURE — 1036F TOBACCO NON-USER: CPT | Performed by: NURSE PRACTITIONER

## 2020-06-29 PROCEDURE — G8427 DOCREV CUR MEDS BY ELIG CLIN: HCPCS | Performed by: NURSE PRACTITIONER

## 2020-06-29 ASSESSMENT — ENCOUNTER SYMPTOMS
SHORTNESS OF BREATH: 1
CHEST TIGHTNESS: 0

## 2020-06-29 NOTE — LETTER
She stated that she has a burn on her back from a heating pad. She has tried to increase exercise, she is walking. She stated she watches her blood pressure at home and it has been good, but she is unsure of the numbers. She writes them down in her book. Denies any pain to right wrist, stated it was healing well. Taking all medications as prescribed, no refills needed at this time. Dyllan Plunkett describes symptoms including dyspnea but denies chest pain, palpitations, orthopnea, PND, exertional chest pressure/discomfort, fatigue, early saiety, edema, syncope. Past Medical History:   has a past medical history of Abnormal brain MRI, Acute bilateral low back pain without sciatica, SHARRON (acute kidney injury) ([address] Arthritis, Bipolar disorder ([address] CAD (coronary artery disease), Cancer ([address] Carotid stenosis, bilateral:<50%:per [place] 7/2016, Carpal tunnel syndrome, Cervical cancer screening, Coronary artery disease of native artery of native heart with stable angina pectoris ([address] DDD (degenerative disc disease), lumbar, Depression, Depression/anxiety, Depression/anxiety, Diabetes mellitus ([address] Gout, History of mammogram, Hyperlipidemia, Hypertension, 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 Past Surgical History:   has a past surgical history that includes Kidney removal; Hysterectomy; Breast lumpectomy (2015); Tubal ligation; other surgical history (Right); and Cardiac catheterization (06/08/2020). Home Medications:    Prior to Admission medications    Medication Sig Start Date End Date Taking?  Authorizing Provider  06/11/2020     06/10/2020    CO2 19 06/25/2020    CO2 23 06/11/2020    CO2 21 06/10/2020    PHOS 4.5 04/22/2020    PHOS 3.8 07/08/2017    PHOS 3.1 07/07/2017    BUN 20 06/25/2020    BUN 19 06/11/2020    BUN 21 06/10/2020    CREATININE 1.2 06/25/2020    CREATININE 1.1 06/11/2020    CREATININE 1.1 06/10/2020     BNP:   Lab Results   Component Value Date    PROBNP 107 04/22/2020     FASTING LIPID PANEL:  Lab Results   Component Value Date    HDL 39 06/25/2020    LDLCALC 33 06/25/2020    TRIG 118 06/25/2020     LIVER PROFILE:No results for input(s): AST, ALT, ALB in the last 72 hours. Reviewed all labs and imaging today    Assessment:   1. CAD: denies any chest pain/pressure    - s/p PCI to diagonal, on dual antiplatelet therapy, statin; no BB given bradycardia  2. HTN: controlled   3. HLD: LDL 34, on statin  4. PAD: + carotid disease  5. DM: A1C 7.3- follows with endocrinology   6. Hx nephrectomy  7. Hx CVA    Plan:   1. No change in heart mediation at this time- Brilinta for 1 year and aspirin for rest of life. If you continue with the shortness of breath we can change Brilinta if needed  2. Keep follow up appointment with Portia Mooney on 7/21/2020 as scheduled, she will follow with Dr. Alek Ward thereafter- take blood pressure log with you to follow up appointment  3. Cardiac rehab- patient refused  4. Diet and exercise as discussed  5. Continue to monitor blood pressure- take log with you when you follow up 7/21    ALANA Mcdaniels  [address]  [phone]      QUALITY MEASURES  1. Tobacco Cessation Counseling: No  2. Retake of BP if >140/90:   NA  3. Documentation to PCP/referring for new patient:  Sent to PCP at close of office visit  4. CAD patient on anti-platelet: Yes  5. CAD patient on STATIN therapy:  Yes  6.  Patient with CHF and aFib on anticoagulation:  NA      Sincerely,      SARTHAK Mcdaniels CNP

## 2020-06-29 NOTE — PROGRESS NOTES
[facility]   Cardiology Note              Date:  June 29, 2020  Patientname: Hedy Stuart  YOB: 1960   Chief Complaint   Patient presents with    Follow-Up from [facility]IR[facility] L[facility]PE[facility]     S/P 6/8 Cath/ Faustino Collins        Primary Care physician: Mateo Campbell    HISTORY OF PRESENT ILLNESS: Hedy Stuart is a 63 y.o. female resented to the hospital with complaints of chest pain. Note deep inspiration makes chest pain worse. CT without PE. Troponin negative x4. Stress test with low risk abnormality, echocardiogram showed EF 55%. Pt remained to have chest pressure and underwent LHC 6/8/20 with 75% LAD/ Diagonal treated with PCI/ DAYANA X1. Chest pressure resolved after PCI. Today she presents for follow up s/p  C 6/8/20 with 75% LAD/ Diagonal treated with PCI/ DAYANA X1. She stated her chest pressure resolved after stent placement. Overall she stated she feels a little fatigued, but stated it has improved since being discharged. She stated she likes to sleep in so she is a little sleepy this am at appointment. Denies chest pain, palpitations, orthopnea, PND and dizziness. She stated sometimes she feels she has to take a deep breath in order to catch her breath, but improving. Informed her that Brilinta can cause this and if it doesn't improve to let us know. She stated that it is improving from discharge. She stated that she has a burn on her back from a heating pad. She has tried to increase exercise, she is walking. She stated she watches her blood pressure at home and it has been good, but she is unsure of the numbers. She writes them down in her book. Denies any pain to right wrist, stated it was healing well. Taking all medications as prescribed, no refills needed at this time. Hedy Stuart describes symptoms including dyspnea but denies chest pain, palpitations, orthopnea, PND, exertional chest pressure/discomfort, fatigue, early saiety, edema, syncope.      Past Medical History:   has a past

## 2020-06-30 RX ORDER — PEN NEEDLE, DIABETIC 32GX 5/32"
NEEDLE, DISPOSABLE MISCELLANEOUS
Qty: 100 EACH | Refills: 6 | Status: ON HOLD | OUTPATIENT
Start: 2020-06-30 | End: 2021-06-04 | Stop reason: HOSPADM

## 2020-07-10 ENCOUNTER — HOSPITAL ENCOUNTER (OUTPATIENT)
Age: 60
Setting detail: OBSERVATION
Discharge: HOME | End: 2020-07-11
Attending: INTERNAL MEDICINE | Admitting: INTERNAL MEDICINE
Payer: MEDICAID

## 2020-07-10 LAB
GLUCOSE BLD-MCNC: 352 MG/DL (ref 70–99)
PERFORMED ON: ABNORMAL
TROPONIN: <0.01 NG/ML

## 2020-07-10 PROCEDURE — 6370000000 HC RX 637 (ALT 250 FOR IP): Performed by: NURSE PRACTITIONER

## 2020-07-10 PROCEDURE — 36415 COLL VENOUS BLD VENIPUNCTURE: CPT

## 2020-07-10 PROCEDURE — 84484 ASSAY OF TROPONIN QUANT: CPT

## 2020-07-10 PROCEDURE — 2580000003 HC RX 258: Performed by: NURSE PRACTITIONER

## 2020-07-10 PROCEDURE — G0378 HOSPITAL OBSERVATION PER HR: HCPCS

## 2020-07-10 PROCEDURE — G0379 DIRECT REFER HOSPITAL OBSERV: HCPCS

## 2020-07-10 RX ORDER — NITROGLYCERIN 0.4 MG/1
0.4 TABLET SUBLINGUAL EVERY 5 MIN PRN
Status: DISCONTINUED | OUTPATIENT
Start: 2020-07-10 | End: 2020-07-11 | Stop reason: HOSPADM

## 2020-07-10 RX ORDER — CLONAZEPAM 1 MG/1
1 TABLET ORAL 2 TIMES DAILY PRN
Status: DISCONTINUED | OUTPATIENT
Start: 2020-07-10 | End: 2020-07-11 | Stop reason: HOSPADM

## 2020-07-10 RX ORDER — SODIUM CHLORIDE 0.9 % (FLUSH) 0.9 %
10 SYRINGE (ML) INJECTION PRN
Status: DISCONTINUED | OUTPATIENT
Start: 2020-07-10 | End: 2020-07-11 | Stop reason: HOSPADM

## 2020-07-10 RX ORDER — NICOTINE POLACRILEX 4 MG
15 LOZENGE BUCCAL PRN
Status: DISCONTINUED | OUTPATIENT
Start: 2020-07-10 | End: 2020-07-11 | Stop reason: HOSPADM

## 2020-07-10 RX ORDER — ACETAMINOPHEN 650 MG/1
650 SUPPOSITORY RECTAL EVERY 6 HOURS PRN
Status: DISCONTINUED | OUTPATIENT
Start: 2020-07-10 | End: 2020-07-11 | Stop reason: HOSPADM

## 2020-07-10 RX ORDER — LETROZOLE 2.5 MG/1
2.5 TABLET, FILM COATED ORAL DAILY
Status: DISCONTINUED | OUTPATIENT
Start: 2020-07-11 | End: 2020-07-11 | Stop reason: HOSPADM

## 2020-07-10 RX ORDER — GABAPENTIN 300 MG/1
600 CAPSULE ORAL 3 TIMES DAILY
Status: DISCONTINUED | OUTPATIENT
Start: 2020-07-10 | End: 2020-07-11 | Stop reason: HOSPADM

## 2020-07-10 RX ORDER — DEXTROSE MONOHYDRATE 25 G/50ML
12.5 INJECTION, SOLUTION INTRAVENOUS PRN
Status: DISCONTINUED | OUTPATIENT
Start: 2020-07-10 | End: 2020-07-11 | Stop reason: HOSPADM

## 2020-07-10 RX ORDER — TRAZODONE HYDROCHLORIDE 50 MG/1
100 TABLET ORAL NIGHTLY
Status: DISCONTINUED | OUTPATIENT
Start: 2020-07-10 | End: 2020-07-11 | Stop reason: HOSPADM

## 2020-07-10 RX ORDER — PROMETHAZINE HYDROCHLORIDE 25 MG/1
12.5 TABLET ORAL EVERY 6 HOURS PRN
Status: DISCONTINUED | OUTPATIENT
Start: 2020-07-10 | End: 2020-07-11 | Stop reason: HOSPADM

## 2020-07-10 RX ORDER — ASPIRIN 81 MG/1
81 TABLET ORAL DAILY
Status: DISCONTINUED | OUTPATIENT
Start: 2020-07-11 | End: 2020-07-11 | Stop reason: HOSPADM

## 2020-07-10 RX ORDER — DEXTROSE MONOHYDRATE 50 MG/ML
100 INJECTION, SOLUTION INTRAVENOUS PRN
Status: DISCONTINUED | OUTPATIENT
Start: 2020-07-10 | End: 2020-07-11 | Stop reason: HOSPADM

## 2020-07-10 RX ORDER — POLYETHYLENE GLYCOL 3350 17 G/17G
17 POWDER, FOR SOLUTION ORAL DAILY PRN
Status: DISCONTINUED | OUTPATIENT
Start: 2020-07-10 | End: 2020-07-11 | Stop reason: HOSPADM

## 2020-07-10 RX ORDER — ACETAMINOPHEN 325 MG/1
650 TABLET ORAL EVERY 6 HOURS PRN
Status: DISCONTINUED | OUTPATIENT
Start: 2020-07-10 | End: 2020-07-11 | Stop reason: HOSPADM

## 2020-07-10 RX ORDER — LISINOPRIL 10 MG/1
10 TABLET ORAL DAILY
Status: DISCONTINUED | OUTPATIENT
Start: 2020-07-11 | End: 2020-07-11 | Stop reason: HOSPADM

## 2020-07-10 RX ORDER — ONDANSETRON 2 MG/ML
4 INJECTION INTRAMUSCULAR; INTRAVENOUS EVERY 6 HOURS PRN
Status: DISCONTINUED | OUTPATIENT
Start: 2020-07-10 | End: 2020-07-11 | Stop reason: HOSPADM

## 2020-07-10 RX ORDER — INSULIN GLARGINE 100 [IU]/ML
24 INJECTION, SOLUTION SUBCUTANEOUS EVERY MORNING
Status: DISCONTINUED | OUTPATIENT
Start: 2020-07-11 | End: 2020-07-11 | Stop reason: HOSPADM

## 2020-07-10 RX ORDER — ATORVASTATIN CALCIUM 10 MG/1
20 TABLET, FILM COATED ORAL DAILY
Status: DISCONTINUED | OUTPATIENT
Start: 2020-07-11 | End: 2020-07-11 | Stop reason: HOSPADM

## 2020-07-10 RX ORDER — SODIUM CHLORIDE 0.9 % (FLUSH) 0.9 %
10 SYRINGE (ML) INJECTION EVERY 12 HOURS SCHEDULED
Status: DISCONTINUED | OUTPATIENT
Start: 2020-07-10 | End: 2020-07-11 | Stop reason: HOSPADM

## 2020-07-10 RX ORDER — PALIPERIDONE 3 MG/1
9 TABLET, EXTENDED RELEASE ORAL EVERY MORNING
Status: DISCONTINUED | OUTPATIENT
Start: 2020-07-11 | End: 2020-07-11 | Stop reason: HOSPADM

## 2020-07-10 RX ADMIN — TICAGRELOR 90 MG: 90 TABLET ORAL at 22:37

## 2020-07-10 RX ADMIN — TRAZODONE HYDROCHLORIDE 100 MG: 50 TABLET ORAL at 22:38

## 2020-07-10 RX ADMIN — ACETAMINOPHEN 650 MG: 325 TABLET ORAL at 22:47

## 2020-07-10 RX ADMIN — Medication 10 ML: at 22:41

## 2020-07-10 RX ADMIN — INSULIN LISPRO 7 UNITS: 100 INJECTION, SOLUTION INTRAVENOUS; SUBCUTANEOUS at 22:38

## 2020-07-10 RX ADMIN — GABAPENTIN 600 MG: 300 CAPSULE ORAL at 22:38

## 2020-07-10 ASSESSMENT — PAIN SCALES - GENERAL
PAINLEVEL_OUTOF10: 6
PAINLEVEL_OUTOF10: 6
PAINLEVEL_OUTOF10: 4

## 2020-07-10 ASSESSMENT — PAIN DESCRIPTION - LOCATION: LOCATION: CHEST

## 2020-07-10 ASSESSMENT — PAIN DESCRIPTION - PAIN TYPE: TYPE: ACUTE PAIN

## 2020-07-10 NOTE — H&P
Hospital Medicine History & Physical      PCP: Sherrie Alexis    Date of Admission: (Not on file)    Date of Service: Pt seen/examined on 7/10/2020 and Admitted to observation with expected LOS less than two midnights due to medical therapy. Chief Complaint: Chest pain    History Of Present Illness:     63 y.o. female, with past medical history of CAD, hypertension, obesity and diabetes, who was a direct admit from [facility] to [facility] with chest pain. History obtained from the patient and review of EMR. The patient stated she was visiting family today near [facility] when she developed midsternal chest pain. She stated the pain radiated to the left side of her chest, but was not associated with any other symptoms. The patient stated she does have a history of CAD with a stent placed in June 2020 with Dr. Trevor Figueroa. In the emergency room the patient was found to have a negative troponin as well as a nonischemic EKG. She was ultimately transferred to [facility] for further evaluation. The patient denied any other associated symptoms as well as any aggravating and/or alleviating factors. At the time of this assessment, the patient was resting comfortably in bed. She currently denies any chest pain, back pain, abdominal pain, shortness of breath, numbness, tingling, N/V/C/D, fever and/or chills.      Past Medical History:          Diagnosis Date    Abnormal brain MRI 7/20/2017    Partially empty sella and minimal chronic small vessel ischemic disease    Acute bilateral low back pain without sciatica 11/2/2016    SHARRON (acute kidney injury) ([facility] 7/5/2017    Arthritis     back    Bipolar disorder ([facility] [address] 10/18/2008    CAD (coronary artery disease)     stent placed 6/8/20    Cancer [facility]) 2015    bilateral breast:s/p lumpectomy/radiation:under care care of breast specialist:Dr. Boone     Carotid stenosis, bilateral:<50%:per US 7/2016 7/15/2016    Carpal tunnel syndrome 10/18/2008    Cervical cancer screening 2014    Nml per pt'.  Coronary artery disease of native artery of native heart with stable angina pectoris ([facility] [address] 6/9/2020    DDD (degenerative disc disease), lumbar 7/18/2018    Depression     under care of pschiatrist:Dr. 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  Scoliosis     Spondylosis of lumbar region without myelopathy or radiculopathy 3/10/2017    Transient cerebral ischemia 07/15/2016    TIA:7/10/16    Unspecified cerebral artery occlusion with cerebral infarction     TIA     Past Surgical History:          Procedure Laterality Date    BREAST LUMPECTOMY  2015    Bilateral:breast cancer    CARDIAC CATHETERIZATION  06/08/2020    Dr. Anabela Bustos), DAYANA to Diag 1    HYSTERECTOMY      Benign:no cervical cancer per pt'    KIDNEY REMOVAL      right    OTHER SURGICAL HISTORY Right     orif right ankle    TUBAL LIGATION       Medications Prior to Admission:      Prior to Admission medications    Medication Sig Start Date End Date Taking?  Authorizing Provider   Insulin Pen Needle (UNIFINE PENTIPS) 32G X 4 MM MISC USE AS DIRECTED 3 TIMES A DAY 6/30/20   Bhavin Ventura MD   [facility]) 90 MG TABS tablet Take 1 tablet by mouth 2 times daily 6/29/20   Erin Soalno, APRN - CNP   simvastatin (ZOCOR) 20 MG tablet Take 20 mg by mouth nightly    Historical Provider, MD   cetirizine (ZYRTEC) 10 MG tablet Take 10 mg by mouth daily    Historical Provider, MD   lisinopril (PRINIVIL;ZESTRIL) 10 MG tablet Take 10 mg by mouth daily    Historical Provider, MD   Liraglutide (VICTOZA) 18 MG/3ML SOPN SC injection Inject 1.2 mg into the skin daily    Historical Provider, MD   Empagliflozin-metFORMIN HCl 12.5-1000 MG TABS Take 2 tablets by mouth daily 6/25/20   Bob Delatorre MD   Blood Pressure Monitor GINA Check BP at home once or twice a day 5/1/20   Bob Delatorre MD   insulin glargine (LANTUS SOLOSTAR) 100 UNIT/ML injection pen Inject 22 Units into the skin every morning  Patient taking differently: Inject 24 Units into the skin every morning  3/1/20   Bob Delatorre MD   Insulin Syringe-Needle U-100 31G X 5/16\" 0.3 ML MISC USE 3 TIMES A DAY BEFORE MEALS 1/31/20   Bob Delatorre MD   TRUE METRIX BLOOD GLUCOSE TEST strip USE AS DIRECTED TO TEST 4 TIMES A DAY 1/27/20   Bob Delatorre MD   calcium carbonate-vitamin D (CALTRATE) 600-400 MG-UNIT TABS per tab  12/30/19   Historical Provider, MD   letrozole ([address] [facility]) 2.5 MG tablet TAKE 1 TABLET BY MOUTH EVERY DAY 2/1/19   Historical Provider, MD   glucagon 1 MG injection Inject 1 mg into the muscle See Admin Instructions Follow package directions for low blood sugar. 2/19/19   Bob Delatorre MD   clonazePAM (KLONOPIN) 1 MG tablet Take 1 mg by mouth as needed. Mariely Seo Historical Provider, MD   paliperidone (INVEGA) 9 MG extended release tablet Take 9 mg by mouth every morning  1/22/18   Historical Provider, MD   aspirin 81 MG tablet Take 81 mg by mouth daily    Historical Provider, MD   gabapentin (NEURONTIN) 600 MG tablet Take 600 mg by mouth 3 times daily    Historical Provider, MD   oxyCODONE-acetaminophen (PERCOCET) 5-325 MG per tablet Take 1 tablet by mouth every 6 hours as needed for Pain. Mariely Diallo Provider, MD   traZODone (DESYREL) 100 MG tablet Take 100 mg by mouth nightly    Historical Provider, MD     Allergies: Morphine; Penicillins; Codeine; and Penicillin g    Social History:      The patient currently lives at home    TOBACCO:   reports that she quit smoking about 6 years ago. Her smoking use included cigarettes and cigars. She has a 10.00 pack-year smoking history. She has never used smokeless tobacco.  ETOH:   reports no history of alcohol use. E-Cigarettes Vaping or Juuling     Questions Responses    Vaping Use Never User    Start Date     Does device contain nicotine? Never    Quit Date     Vaping Type         Family History:      Reviewed in detail. Positive as follows:        Problem Relation Age of Onset    Cancer Mother         breast    Cancer Father     Heart Failure Neg Hx     High Cholesterol Neg Hx     Hypertension Neg Hx     Migraines Neg Hx     Rashes/Skin Problems Neg Hx     Seizures Neg Hx     Stroke Neg Hx     Thyroid Disease Neg Hx     Diabetes Neg Hx      REVIEW OF SYSTEMS:   Pertinent positives as noted in the HPI. All other systems reviewed and negative. PHYSICAL EXAM PERFORMED:    There were no vitals taken for this visit. General appearance: Pleasant, obese female in no apparent distress, appears stated age and cooperative. HEENT:  Pupils equal, round, and reactive to light. Extra ocular muscles intact. Conjunctivae/corneas clear. Neck: Supple, with full range of motion. No jugular venous distention. Trachea midline. Respiratory:  Normal respiratory effort. Clear to auscultation, bilaterally without Rales/Wheezes/Rhonchi. Cardiovascular:  Regular rate and rhythm with normal S1/S2 without murmurs, rubs or gallops. Abdomen: Soft, obese, round non-tender, non-distended with normal bowel sounds. Musculoskeletal:  No clubbing, cyanosis or edema bilaterally. Full range of motion without deformity. Skin: Skin color, texture, turgor normal.  No significant rashes or lesions. Neurologic:  Neurovascularly intact.  Cranial nerves: II-XII intact, grossly non-focal.  Psychiatric: Alert and oriented, thought content appropriate, normal insight  Capillary Refill: Brisk,< 3 seconds   Peripheral Pulses: +2 palpable, equal bilaterally     Labs:     Urinalysis:      Lab Results   Component Value Date    NITRU POSITIVE 06/09/2020    WBCUA 10-20 06/09/2020    BACTERIA 1+ 06/09/2020    RBCUA 0-2 06/09/2020    BLOODU TRACE-INTACT 06/09/2020    SPECGRAV 1.015 06/09/2020    GLUCOSEU >=1000 06/09/2020    GLUCOSEU >=1000 mg/dL 06/07/2010     Radiology:     CXR: I have reviewed the CXR with the following interpretation:     EKG:  I have reviewed the EKG with the following interpretation:     No orders to display     ASSESSMENT:    Active Hospital Problems    Diagnosis Date Noted    CAD (coronary artery disease) [I25.10] 06/09/2020    Chest pain [R07.9] 06/05/2020    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 ([facility] [address] [E11.59] 12/19/2017    Obesity (BMI 30-39. 9) [E66.9] 07/01/2016    Essential hypertension [I10] 06/17/2016     PLAN:    Chest pain in setting of known CAD  -atypical  -serial troponin - initial negative  -EKG w/o ischemic changes  -FLP in am  -NPO after MN  -recent cath on 6/22/2020 - stent placement to Diag I  -cardiology consulted - appreciate recommendations in advance  -ASA and statin daily  -continue Brilinta  -prn nitro  -tele monitoring    Essential HTN  -continue lisinopril    HLD  -continue statin    DM2 with peripheral neuoropathy,   -BG  -hemglobin a1c 7.3 on 6/25/2020  -hold home metformin  -continue lantus  -hdssi  -poct ac/hs  -hypoglycemia protocol  -carb control diet  -continue gabapentin    Obesity  With Body mass index is  kg/m². Complicating assessment and treatment. Placing patient at risk for multiple co-morbidities as well as early death and contributing to the patient's presentation.  Counseled on weight loss    Anxiety/depression  -continue klonopin, trazadone and invega    DVT Prophylaxis: Brilinta    Diet: No diet orders on file     Code Status: Prior    PT/OT

## 2020-07-11 ENCOUNTER — APPOINTMENT (OUTPATIENT)
Dept: GENERAL RADIOLOGY | Age: 60
End: 2020-07-11
Attending: INTERNAL MEDICINE
Payer: MEDICAID

## 2020-07-11 VITALS
WEIGHT: 144.8 LBS | BODY MASS INDEX: 25.66 KG/M2 | OXYGEN SATURATION: 100 % | SYSTOLIC BLOOD PRESSURE: 151 MMHG | TEMPERATURE: 98.6 F | DIASTOLIC BLOOD PRESSURE: 70 MMHG | HEART RATE: 77 BPM | RESPIRATION RATE: 16 BRPM | HEIGHT: 63 IN

## 2020-07-11 LAB
EKG ATRIAL RATE: 55 BPM
EKG DIAGNOSIS: NORMAL
EKG P AXIS: 41 DEGREES
EKG P-R INTERVAL: 156 MS
EKG Q-T INTERVAL: 440 MS
EKG QRS DURATION: 70 MS
EKG QTC CALCULATION (BAZETT): 420 MS
EKG R AXIS: -14 DEGREES
EKG T AXIS: 54 DEGREES
EKG VENTRICULAR RATE: 55 BPM
GLUCOSE BLD-MCNC: 152 MG/DL (ref 70–99)
GLUCOSE BLD-MCNC: 293 MG/DL (ref 70–99)
GLUCOSE BLD-MCNC: 299 MG/DL (ref 70–99)
HCT VFR BLD CALC: 33 % (ref 36–48)
HEMOGLOBIN: 10.4 G/DL (ref 12–16)
MCH RBC QN AUTO: 28.5 PG (ref 26–34)
MCHC RBC AUTO-ENTMCNC: 31.5 G/DL (ref 31–36)
MCV RBC AUTO: 90.4 FL (ref 80–100)
PDW BLD-RTO: 14.1 % (ref 12.4–15.4)
PERFORMED ON: ABNORMAL
PLATELET # BLD: 172 K/UL (ref 135–450)
PMV BLD AUTO: 9.9 FL (ref 5–10.5)
RBC # BLD: 3.65 M/UL (ref 4–5.2)
TROPONIN: <0.01 NG/ML
WBC # BLD: 4.7 K/UL (ref 4–11)

## 2020-07-11 PROCEDURE — 6370000000 HC RX 637 (ALT 250 FOR IP): Performed by: INTERNAL MEDICINE

## 2020-07-11 PROCEDURE — 6370000000 HC RX 637 (ALT 250 FOR IP): Performed by: NURSE PRACTITIONER

## 2020-07-11 PROCEDURE — 93005 ELECTROCARDIOGRAM TRACING: CPT | Performed by: NURSE PRACTITIONER

## 2020-07-11 PROCEDURE — G0378 HOSPITAL OBSERVATION PER HR: HCPCS

## 2020-07-11 PROCEDURE — 85027 COMPLETE CBC AUTOMATED: CPT

## 2020-07-11 PROCEDURE — 84484 ASSAY OF TROPONIN QUANT: CPT

## 2020-07-11 PROCEDURE — 36415 COLL VENOUS BLD VENIPUNCTURE: CPT

## 2020-07-11 PROCEDURE — 93010 ELECTROCARDIOGRAM REPORT: CPT | Performed by: INTERNAL MEDICINE

## 2020-07-11 PROCEDURE — 71045 X-RAY EXAM CHEST 1 VIEW: CPT

## 2020-07-11 PROCEDURE — 99245 OFF/OP CONSLTJ NEW/EST HI 55: CPT | Performed by: INTERNAL MEDICINE

## 2020-07-11 PROCEDURE — 2580000003 HC RX 258: Performed by: NURSE PRACTITIONER

## 2020-07-11 RX ORDER — NITROGLYCERIN 0.4 MG/1
TABLET SUBLINGUAL
Qty: 25 TABLET | Refills: 3 | Status: ON HOLD | OUTPATIENT
Start: 2020-07-11 | End: 2021-09-29 | Stop reason: SDUPTHER

## 2020-07-11 RX ORDER — PANTOPRAZOLE SODIUM 40 MG/1
40 TABLET, DELAYED RELEASE ORAL
Status: DISCONTINUED | OUTPATIENT
Start: 2020-07-12 | End: 2020-07-11 | Stop reason: HOSPADM

## 2020-07-11 RX ORDER — FAMOTIDINE 10 MG
20 TABLET ORAL 2 TIMES DAILY
Status: DISCONTINUED | OUTPATIENT
Start: 2020-07-11 | End: 2020-07-11 | Stop reason: HOSPADM

## 2020-07-11 RX ORDER — PANTOPRAZOLE SODIUM 40 MG/1
40 TABLET, DELAYED RELEASE ORAL
Qty: 30 TABLET | Refills: 3 | Status: ON HOLD | OUTPATIENT
Start: 2020-07-11 | End: 2021-01-26

## 2020-07-11 RX ADMIN — INSULIN GLARGINE 24 UNITS: 100 INJECTION, SOLUTION SUBCUTANEOUS at 09:39

## 2020-07-11 RX ADMIN — LISINOPRIL 10 MG: 10 TABLET ORAL at 09:34

## 2020-07-11 RX ADMIN — INSULIN LISPRO 9 UNITS: 100 INJECTION, SOLUTION INTRAVENOUS; SUBCUTANEOUS at 09:38

## 2020-07-11 RX ADMIN — PALIPERIDONE 9 MG: 3 TABLET, EXTENDED RELEASE ORAL at 09:35

## 2020-07-11 RX ADMIN — LIDOCAINE HYDROCHLORIDE: 20 SOLUTION ORAL; TOPICAL at 11:54

## 2020-07-11 RX ADMIN — FAMOTIDINE 20 MG: 10 TABLET ORAL at 11:54

## 2020-07-11 RX ADMIN — ATORVASTATIN CALCIUM 20 MG: 10 TABLET, FILM COATED ORAL at 09:35

## 2020-07-11 RX ADMIN — Medication 10 ML: at 09:35

## 2020-07-11 RX ADMIN — TICAGRELOR 90 MG: 90 TABLET ORAL at 09:35

## 2020-07-11 RX ADMIN — GABAPENTIN 600 MG: 300 CAPSULE ORAL at 09:34

## 2020-07-11 RX ADMIN — ASPIRIN 81 MG: 81 TABLET, COATED ORAL at 09:34

## 2020-07-11 RX ADMIN — LETROZOLE 2.5 MG: 2.5 TABLET ORAL at 09:34

## 2020-07-11 ASSESSMENT — PAIN SCALES - GENERAL: PAINLEVEL_OUTOF10: 0

## 2020-07-11 NOTE — PLAN OF CARE
She can have her diet. She needs a CXR.  If the CXR is negative she will be discharged home today    Krystal Fleischer  7/11/2020

## 2020-07-11 NOTE — CONSULTS
[address]   Cardiology Consultation   Date: 7/11/2020  Reason for Consultation: Chest pain  Consult Requesting Physician: Giles Sher MD     CC: Chest pain  HPI: Jay Hinds is a 63 y.o. multiple comorbidities including coronary artery disease status post recent cardiac catheterization and DAYANA to diag, obesity, diabetes who is been admitted with chest pain. Patient states that she did well after her PCI. For the last day or so she started having upper epigastric and chest discomfort, intermittent, worse with eating. She also has diarrhea. Denies any nausea. No syncope. No palpitation. Patient states that she gets headaches with nitroglycerin. Past Medical History:   Diagnosis Date    Abnormal brain MRI 7/20/2017    Partially empty sella and minimal chronic small vessel ischemic disease    Acute bilateral low back pain without sciatica 11/2/2016    SHARRON (acute kidney injury) ([address] 7/5/2017    Arthritis     back    Bipolar disorder ([address] 10/18/2008    CAD (coronary artery disease)     stent placed 6/8/20    Cancer [facility]) 2015    bilateral breast:s/p lumpectomy/radiation:under care care of breast specialist:Dr. Boone     Carotid stenosis, bilateral:<50%:per US 7/2016 7/15/2016    Carpal tunnel syndrome 10/18/2008    Cervical cancer screening 2014    Nml per pt'.     Coronary artery disease of native artery of native heart with stable angina pectoris ([address] 6/9/2020    DDD (degenerative disc disease), lumbar 7/18/2018    Depression     under care of pschiatrist:Dr. 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 ECG: Sinus bradycardia, no acute changes  Echo: 6/6 2020   Normal left ventricle systolic function with an estimated ejection fraction   of 55%. No regional wall motion abnormalities are seen. Normal left ventricular diastolic filling pressure. Trace mitral, pulmonic and tricuspid regurgitation. Systolic pulmonary artery pressure (SPAP) is normal and estimated at 27 mmHg   (right atrial pressure 3 mmHg). Cath: 6/8/2020  LEFT HEART CATH  LM: ostial 15%  LAD: mid 20%, distal short myocardial bridge then long segment 40% stenosis. Diag 1: prox 75%  LCX: luminals                OM1- prox 15% eccentric  RCA: dominant     LVEDP:9  LVEF: 65%, normal wall motion     PCI of Prox Diag 1  STENT:resolute 2 x 15 post dilated to 2.3mm     Assessment  1. Successful PCi to Diag 1. CP resolved                Residual 10%, SHIN-3  2. COnt integrillin for 12 hr  3. ASA 81mg qday, Ticagrelor 90mg po BID  4.  Statin as tolerated                - NO beta blocker as bradycardia after single dose    Scheduled Meds:   aspirin  81 mg Oral Daily    gabapentin  600 mg Oral TID    insulin glargine  24 Units Subcutaneous QAM    letrozole  2.5 mg Oral Daily    lisinopril  10 mg Oral Daily    atorvastatin  20 mg Oral Daily    ticagrelor  90 mg Oral BID    traZODone  100 mg Oral Nightly    paliperidone  9 mg Oral QAM    sodium chloride flush  10 mL Intravenous 2 times per day    insulin lispro  0-18 Units Subcutaneous TID WC    insulin lispro  0-9 Units Subcutaneous Nightly     Continuous Infusions:   dextrose      dextrose       PRN Meds:.clonazePAM, sodium chloride flush, acetaminophen **OR** acetaminophen, polyethylene glycol, promethazine **OR** ondansetron, nitroGLYCERIN, glucose, dextrose, dextrose, glucose, dextrose, glucagon (rDNA), dextrose     Assessment and plan:   Patient Active Problem List    Diagnosis Date Noted    CAD (coronary artery disease) 06/09/2020    Chest pain 06/05/2020    Hypomagnesemia 01/23/2020    Acute cystitis without hematuria 01/22/2020    History of CVA (cerebrovascular accident) without residual deficits 07/08/2019    S/P mastectomy, right 07/08/2019    Age-related nuclear cataract of both eyes 04/29/2019    Hypermetropia, bilateral 04/29/2019    Hypertensive retinopathy, bilateral 04/29/2019    Vitreous degeneration, bilateral 04/29/2019    Morbid obesity due to excess calories ([address] 02/19/2019    Chronic pain disorder 07/18/2018    DDD (degenerative disc disease), lumbar 07/18/2018    Diabetic polyneuropathy associated with type 2 diabetes mellitus ([address] 07/18/2018    Other secondary scoliosis, lumbosacral region 07/18/2018    Thoracic spondylosis without myelopathy 07/18/2018    Uncontrolled type 2 diabetes mellitus with diabetic nephropathy, with long-term current use of insulin ([address] 12/19/2017    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 ([address] 12/19/2017    Dyslipidemia associated with type 2 diabetes mellitus ([address] 12/19/2017    Closed fracture of right ankle, with routine healing, subsequent encounter 09/17/2017    CKD (chronic kidney disease), stage III ([address] 09/17/2017    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 stage 3 chronic kidney disease ([address] 09/17/2017    Uncontrolled type 2 diabetes mellitus with microalbuminuria, with long-term current use of insulin ([address] 09/01/2017    Abnormal brain MRI 07/20/2017    Acute bilateral low back pain without sciatica 07/20/2017    Acute bilateral low back pain with left-sided sciatica 07/20/2017    SHARRON (acute kidney injury) ([address] 07/05/2017    Lactic acidosis 07/05/2017    Frequent falls 07/05/2017    Depression/anxiety 07/05/2017    SOBOE (shortness of breath on exertion) 05/05/2017    History of breast cancer 07/20/2016    History of renal cell carcinoma 07/20/2016    Gallstone pancreatitis 07/18/2016    Carotid stenosis, bilateral:<50%:per US 7/2016 07/15/2016    Hx of ischemic CVA     Brain metastases ([facility]     Slurred speech 07/10/2016    Microalbuminuria 07/01/2016    Obesity (BMI 30-39.9) 07/01/2016    Essential hypertension 06/17/2016    Bilateral malignant neoplasm of breast in female [facility]) 06/17/2016    Patient in clinical research study 09/11/2015    Diffuse cystic mastopathy 09/22/2014    Cardiomegaly 03/28/2013    Cardiomyopathy ([address] 03/28/2013    Chronic systolic heart failure ([address] 03/28/2013    Edema 03/28/2013    Nonspecific abnormal electrocardiogram (ECG) (EKG) 03/28/2013    Syncope and collapse 03/28/2013    Cardiomyopathy in other diseases classified elsewhere 05/10/2011    Bipolar disorder ([address] 10/18/2008    Carpal tunnel syndrome 10/18/2008    Gout 10/18/2008    Mononeuritis 10/18/2008    Myalgia and myositis 10/18/2008    Peroneal muscular atrophy 10/18/2008    Scoliosis (and kyphoscoliosis), idiopathic 10/18/2008      Active Hospital Problems    Diagnosis Date Noted    CAD (coronary artery disease) [I25.10] 06/09/2020    Chest pain [R07.9] 06/05/2020    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 9) [E66.9] 07/01/2016    Essential hypertension [I10] 06/17/2016     -Atypical chest discomfort   She has upper epigastric pain associated with diarrhea. Pain is worse with food. Troponin is negative. EKG with no acute changes. She is on dual antiplatelet therapy with aspirin and Brilinta. Lipitor   Lisinopril     Hemodynamically stable    Will defer evaluation of other cause of epigastric pain and diarrhea to hospitalist team.  If GI causes have been ruled out she continues to have pain can consider stress testing/interventional cardiology evaluation. Aggressive risk factor modification and controlling hypertension and diabetes recommended.      Thank you for allowing me to participate in

## 2020-07-11 NOTE — PLAN OF CARE
Problem: Pain:  Goal: Control of acute pain  Description: Control of acute pain  Outcome: Ongoing     Problem: Metabolic:  Goal: Ability to maintain appropriate glucose levels will improve  Description: Ability to maintain appropriate glucose levels will improve  Outcome: Ongoing

## 2020-07-11 NOTE — PLAN OF CARE
Problem: Pain:  Goal: Pain level will decrease  Description: Pain level will decrease  Outcome: Completed  Goal: Control of acute pain  Description: Control of acute pain  7/11/2020 1624 by Pawan Alvarado RN  Outcome: Completed  7/11/2020 0447 by Angy Garcia RN  Outcome: Ongoing  Goal: Control of chronic pain  Description: Control of chronic pain  Outcome: Completed     Problem:  Activity:  Goal: Risk for activity intolerance will decrease  Description: Risk for activity intolerance will decrease  Outcome: Completed     Problem: Coping:  Goal: Ability to adjust to condition or change in health will improve  Description: Ability to adjust to condition or change in health will improve  Outcome: Completed     Problem: Fluid Volume:  Goal: Ability to maintain a balanced intake and output will improve  Description: Ability to maintain a balanced intake and output will improve  Outcome: Completed     Problem: Health Behavior:  Goal: Ability to identify and utilize available resources and services will improve  Description: Ability to identify and utilize available resources and services will improve  Outcome: Completed  Goal: Ability to manage health-related needs will improve  Description: Ability to manage health-related needs will improve  Outcome: Completed     Problem: Health Behavior:  Goal: Ability to manage health-related needs will improve  Description: Ability to manage health-related needs will improve  Outcome: Completed     Problem: Metabolic:  Goal: Ability to maintain appropriate glucose levels will improve  Description: Ability to maintain appropriate glucose levels will improve  7/11/2020 1624 by Pawan Alvarado RN  Outcome: Completed  7/11/2020 0447 by Angy Garcia RN  Outcome: Ongoing     Problem: Nutritional:  Goal: Maintenance of adequate nutrition will improve  Description: Maintenance of adequate nutrition will improve  Outcome: Completed  Goal: Progress toward achieving an optimal weight will improve  Description: Progress toward achieving an optimal weight will improve  Outcome: Completed     Problem: Physical Regulation:  Goal: Complications related to the disease process, condition or treatment will be avoided or minimized  Description: Complications related to the disease process, condition or treatment will be avoided or minimized  Outcome: Completed  Goal: Diagnostic test results will improve  Description: Diagnostic test results will improve  Outcome: Completed     Problem: Skin Integrity:  Goal: Risk for impaired skin integrity will decrease  Description: Risk for impaired skin integrity will decrease  Outcome: Completed     Problem: Tissue Perfusion:  Goal: Adequacy of tissue perfusion will improve  Description: Adequacy of tissue perfusion will improve  Outcome: Completed

## 2020-07-11 NOTE — PROGRESS NOTES
4 Eyes Skin Assessment     The patient is being assess for  Admission    I agree that 2 RN's have performed a thorough Head to Toe Skin Assessment on the patient. ALL assessment sites listed below have been assessed. Areas assessed by both nurses: [address] 57  [x]   Head, Face, and Ears   [x]   Shoulders, Back, and Chest  [x]   Arms, Elbows, and Hands   [x]   Coccyx, Sacrum, and Ischum  [x]   Legs, Feet, and Heels        Does the Patient have Skin Breakdown?   No         Maco Prevention initiated:  No   Wound Care Orders initiated:  No      [facility] nurse consulted for Pressure Injury (Stage 3,4, Unstageable, DTI, NWPT, and Complex wounds):  No      Nurse 1 eSignature: Electronically signed by Osbaldo Forrest RN on 7/10/20 at 9:01 PM EDT    **SHARE this note so that the co-signing nurse is able to place an eSignature**    Nurse 2 eSignature: Electronically signed by Dylan Pino RN on 7/11/20 at 5:46 AM EDT

## 2020-07-11 NOTE — DISCHARGE SUMMARY
Hospital Discharge Summary    Patient's PCP: Ofe Tyree Date: 7/10/2020   Discharge Date: 7/11/2020    Admitting Physician: Dr. Ivana Walls MD  Discharge Physician: Dr. Mukesh Stark: cardiology    HPI: 63 y.o. female, with past medical history of CAD, hypertension, obesity and diabetes, who was a direct admit from [facility] to [facility] with chest pain. History obtained from the patient and review of EMR. The patient stated she was visiting family today near [place] when she developed midsternal chest pain. She stated the pain radiated to the left side of her chest, but was not associated with any other symptoms. The patient stated she does have a history of CAD with a stent placed in June 2020 with Dr. Mali Boss. In the emergency room the patient was found to have a negative troponin as well as a nonischemic EKG. She was ultimately transferred to [facility] for further evaluation. The patient denied any other associated symptoms as well as any aggravating and/or alleviating factors. At the time of this assessment, the patient was resting comfortably in bed. She currently denies any chest pain, back pain, abdominal pain, shortness of breath, numbness, tingling, N/V/C/D, fever and/or chills. Brief hospital course:  Given the concern of the patients presentation and the concern of the possible multi-factorial etiology contributing to patients symptomatology. Patient was admitted and evaluated and found to have noncardiac chest pain most consistent with esophageal spasm and GERD. Acute cardiac and pulmonary pathologies were satisfactorily excluded on the basis of clinical evaluation, Radiological and laboratory studies. She was discharged home in good condition with prescription medical treatments, including PPI and sublingual nitrate.       Discharge Diagnoses:   Patient Active Problem List   Diagnosis    Essential hypertension    Bilateral malignant neoplasm Nurses note reviewed and agreed with.  Patient RTC today with a CC of long, hypertrophied nails. Due to the patient's medical condition, the patient can no longer trim the nails, and the condition causes pain upon ambulation.     No new issues to report with the feet.    Peripheral vascular exam reveals NON-PALPABLE PT PULSES,    HAIR ATROPHY,    THIN/SHINY SKIN,    SKIN RUBOR/ REDNESS,       CLAUDICATION,       COOL EXTREMITIES,       EDEMA,       PARESTHESIAS.   Nails are elongated x 10 (hypertrophic nails).    High arch structure with hammering of the right third toe.  No signs of an open wound or blister and no fluid seen on her feet.    Diagnosis is generalized athlerosclerosis, right hammer toe, and hypertrophic nails with pain.    The nails were all trimmed using a nipper and yvette. There were no cuts.     Patient is instructed to RTC in 2 months.   of breast in female [facility])    Microalbuminuria    Obesity (BMI 30-39. 9)    Slurred speech    Hx of ischemic CVA    Brain metastases ([address]    Carotid stenosis, bilateral:<50%:per US 7/2016    SHARRON (acute kidney injury) ([address]    Lactic acidosis    Frequent falls    Depression/anxiety    Abnormal brain MRI    Acute bilateral low back pain without sciatica    Uncontrolled type 2 diabetes mellitus with microalbuminuria, with long-term current use of insulin (HCC)    Closed fracture of right ankle, with routine healing, subsequent encounter    CKD (chronic kidney disease), stage III ([address]    Uncontrolled type 2 diabetes mellitus with diabetic nephropathy, with long-term current use of insulin (HCC)    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 ([address]    Dyslipidemia associated with type 2 diabetes mellitus ([address]    Bipolar disorder ([address]    Cardiomegaly    Cardiomyopathy ([address]    Carpal tunnel syndrome    Chronic systolic heart failure (HCC)    Diffuse cystic mastopathy    Edema    Gallstone pancreatitis    Gout    History of breast cancer    History of renal cell carcinoma    Cardiomyopathy in other diseases classified elsewhere    Mononeuritis    Myalgia and myositis    Nonspecific abnormal electrocardiogram (ECG) (EKG)    Patient in clinical research study    Peroneal muscular atrophy    Scoliosis (and kyphoscoliosis), idiopathic    SOBOE (shortness of breath on exertion)    Syncope and collapse    Chronic pain disorder    DDD (degenerative disc disease), lumbar    Diabetic polyneuropathy associated with type 2 diabetes mellitus (HCC)    Other secondary scoliosis, lumbosacral region    Thoracic spondylosis without myelopathy    Morbid obesity due to excess calories (HCC)    Age-related nuclear cataract of both eyes    Hypermetropia, bilateral    Hypertensive retinopathy, bilateral    Vitreous degeneration, bilateral    Acute bilateral low back pain with left-sided sciatica    History of CVA (cerebrovascular accident) without residual deficits    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 stage 3 chronic kidney disease (HCC)    S/P mastectomy, right    Acute cystitis without hematuria    Hypomagnesemia    Chest pain    CAD (coronary artery disease)       Physical Exam: BP (!) 157/70   Pulse 52   Temp 98.3 °F (36.8 °C) (Oral)   Resp 14   Ht 5' 3\" (1.6 m)   Wt 144 lb 12.8 oz (65.7 kg)   SpO2 100%   BMI 25.65 kg/m²     Recent Labs     07/10/20  2149 07/11/20  0815 07/11/20  1216   POCGLU 352* 299* 152*       General appearance: Pleasant, obese female in no apparent distress, appears stated age and cooperative. HEENT:  Pupils equal, round, and reactive to light. Extra ocular muscles intact. Conjunctivae/corneas clear. Neck: Supple, with full range of motion. No jugular venous distention. Trachea midline. Respiratory:  Normal respiratory effort. Clear to auscultation, bilaterally without Rales/Wheezes/Rhonchi. Cardiovascular:  Regular rate and rhythm with normal S1/S2 without murmurs, rubs or gallops. Abdomen: Soft, obese, round non-tender, non-distended with normal bowel sounds. Musculoskeletal:  No clubbing, cyanosis or edema bilaterally. Full range of motion without deformity. Skin: Skin color, texture, turgor normal.  No significant rashes or lesions. Neurologic:  Neurovascularly intact. Cranial nerves: II-XII intact, grossly non-focal.  Psychiatric:  Alert and oriented, thought content appropriate, normal insight       LABS:  Recent Labs     07/11/20  0910   WBC 4.7   HGB 10.4*       No results for input(s): NA, K, CL, CO2, BUN, CREATININE, GLUCOSE in the last 72 hours. No results for input(s): INR in the last 72 hours. Significant Diagnostic Studies:  chest x-ray, EKG  Results: No acute findings    Treatments: ASA, nitrate, opiate analgesic, pepcid, protonix    Discharge Medications:    Lexis Code, [facility] Medication Instructions UTF:[id number]    Printed on:07/11/20 2986   Medication Information                      aspirin 81 MG tablet  Take 81 mg by mouth daily             Blood Pressure Monitor GINA  Check BP at home once or twice a day             calcium carbonate-vitamin D (CALTRATE) 600-400 MG-UNIT TABS per tab               cetirizine (ZYRTEC) 10 MG tablet  Take 10 mg by mouth daily             clonazePAM (KLONOPIN) 1 MG tablet  Take 1 mg by mouth as needed. .             Empagliflozin-metFORMIN HCl 12.5-1000 MG TABS  Take 2 tablets by mouth daily             gabapentin (NEURONTIN) 600 MG tablet  Take 600 mg by mouth 3 times daily             glucagon 1 MG injection  Inject 1 mg into the muscle See Admin Instructions Follow package directions for low blood sugar. insulin glargine (LANTUS SOLOSTAR) 100 UNIT/ML injection pen  Inject 22 Units into the skin every morning             Insulin Pen Needle (UNIFINE PENTIPS) 32G X 4 MM MISC  USE AS DIRECTED 3 TIMES A DAY             Insulin Syringe-Needle U-100 31G X 5/16\" 0.3 ML MISC  USE 3 TIMES A DAY BEFORE MEALS             letrozole (FEMARA) 2.5 MG tablet  TAKE 1 TABLET BY MOUTH EVERY DAY             Liraglutide (VICTOZA) 18 MG/3ML SOPN SC injection  Inject 1.2 mg into the skin daily             lisinopril (PRINIVIL;ZESTRIL) 10 MG tablet  Take 10 mg by mouth daily             nitroGLYCERIN (NITROSTAT) 0.4 MG SL tablet  up to max of 3 total doses. If no relief after 1 dose, call 911. oxyCODONE-acetaminophen (PERCOCET) 5-325 MG per tablet  Take 1 tablet by mouth every 6 hours as needed for Pain.  .             paliperidone (INVEGA) 9 MG extended release tablet  Take 9 mg by mouth every morning              pantoprazole (PROTONIX) 40 MG tablet  Take 1 tablet by mouth every morning (before breakfast)             simvastatin (ZOCOR) 20 MG tablet  Take 20 mg by mouth nightly             ticagrelor (BRILINTA) 90 MG TABS tablet  Take 1 tablet by mouth 2 times daily             traZODone (DESYREL) 100 MG tablet  Take 100 mg by mouth nightly             TRUE METRIX BLOOD GLUCOSE TEST strip  USE AS DIRECTED TO TEST 4 TIMES A DAY                Activity: activity as tolerated  Diet: cardiac diet and diabetic diet  Wound Care: none needed    Disposition: home  Discharged Condition: Stable  Follow Up: Primary Care Physician in one week    Total time spent on discharge, finalizing medications, referrals and arranging outpatient follow up was 25 minutes    Thank you Dr. Sidney Rodriguez for the opportunity to be involved in this patients care. If you have any questions or concerns please feel free to contact me at [phone].

## 2020-07-11 NOTE — PROGRESS NOTES
Patient admitted to room 210 from [place]. Patient oriented to room, call light, bed rails, phone, lights and bathroom. Patient instructed about the schedule of the day including: vital sign frequency, lab draws, possible tests, frequency of MD and staff rounds, including RN/MD rounding together at bedside, daily weights, and I &O's. Patient instructed about prescribed diet, how to use 8MENU, and television. No bed alarm in place, patient aware of placement and reason. No Telemetry box in place, patient aware of placement and reason. Bed locked, in lowest position, side rails up 2/4, call light within reach. Will continue to monitor.

## 2020-07-11 NOTE — PROGRESS NOTES
Secure Text sent to Dr Jair Henderson, \"Cardiology said she could eat from their standpoint. Will look for orders.  Thanks\"  Daved Husbands

## 2020-07-16 RX ORDER — CALCIUM CITRATE/VITAMIN D3 200MG-6.25
TABLET ORAL
Qty: 100 STRIP | Refills: 5 | Status: SHIPPED | OUTPATIENT
Start: 2020-07-16 | End: 2021-01-07

## 2020-07-16 NOTE — TELEPHONE ENCOUNTER
Requested Prescriptions     Pending Prescriptions Disp Refills    blood glucose test strips (TRUE METRIX BLOOD GLUCOSE TEST) strip 100 strip 5     Sig: USE AS DIRECTED TO TEST 4 TIMES A DAY       Last OV 6/25/20  Next OV 8/6/20

## 2020-08-04 ENCOUNTER — HOSPITAL ENCOUNTER (EMERGENCY)
Age: 60
Discharge: HOME | End: 2020-08-05
Payer: MEDICAID

## 2020-08-04 LAB
A/G RATIO: 1.1 (ref 1.1–2.2)
ALBUMIN SERPL-MCNC: 4 G/DL (ref 3.4–5)
ALP BLD-CCNC: 201 U/L (ref 40–129)
ALT SERPL-CCNC: 43 U/L (ref 10–40)
ANION GAP SERPL CALCULATED.3IONS-SCNC: 15 MMOL/L (ref 3–16)
AST SERPL-CCNC: 35 U/L (ref 15–37)
BASE EXCESS VENOUS: -7.5 MMOL/L (ref -3–3)
BASOPHILS ABSOLUTE: 0 K/UL (ref 0–0.2)
BASOPHILS RELATIVE PERCENT: 0.2 %
BILIRUB SERPL-MCNC: <0.2 MG/DL (ref 0–1)
BILIRUBIN URINE: NEGATIVE
BLOOD, URINE: NEGATIVE
BUN BLDV-MCNC: 17 MG/DL (ref 7–20)
CALCIUM SERPL-MCNC: 9.2 MG/DL (ref 8.3–10.6)
CARBOXYHEMOGLOBIN: 1.7 % (ref 0–1.5)
CHLORIDE BLD-SCNC: 96 MMOL/L (ref 99–110)
CLARITY: CLEAR
CO2: 20 MMOL/L (ref 21–32)
COLOR: YELLOW
CREAT SERPL-MCNC: 1.2 MG/DL (ref 0.6–1.2)
EOSINOPHILS ABSOLUTE: 0 K/UL (ref 0–0.6)
EOSINOPHILS RELATIVE PERCENT: 0.8 %
GFR AFRICAN AMERICAN: 55
GFR NON-AFRICAN AMERICAN: 46
GLOBULIN: 3.5 G/DL
GLUCOSE BLD-MCNC: 587 MG/DL (ref 70–99)
GLUCOSE BLD-MCNC: 662 MG/DL (ref 70–99)
GLUCOSE URINE: >=1000 MG/DL
HCO3 VENOUS: 17.9 MMOL/L (ref 23–29)
HCT VFR BLD CALC: 34 % (ref 36–48)
HEMOGLOBIN: 10.4 G/DL (ref 12–16)
KETONES, URINE: NEGATIVE MG/DL
LEUKOCYTE ESTERASE, URINE: NEGATIVE
LYMPHOCYTES ABSOLUTE: 2 K/UL (ref 1–5.1)
LYMPHOCYTES RELATIVE PERCENT: 32.4 %
MCH RBC QN AUTO: 27.4 PG (ref 26–34)
MCHC RBC AUTO-ENTMCNC: 30.5 G/DL (ref 31–36)
MCV RBC AUTO: 89.9 FL (ref 80–100)
METHEMOGLOBIN VENOUS: 0.6 %
MICROSCOPIC EXAMINATION: ABNORMAL
MONOCYTES ABSOLUTE: 0.4 K/UL (ref 0–1.3)
MONOCYTES RELATIVE PERCENT: 6.7 %
NEUTROPHILS ABSOLUTE: 3.8 K/UL (ref 1.7–7.7)
NEUTROPHILS RELATIVE PERCENT: 59.9 %
NITRITE, URINE: NEGATIVE
O2 CONTENT, VEN: 15 VOL %
O2 SAT, VEN: 95 %
O2 THERAPY: ABNORMAL
PCO2, VEN: 36 MMHG (ref 40–50)
PDW BLD-RTO: 15 % (ref 12.4–15.4)
PERFORMED ON: ABNORMAL
PH UA: 5.5 (ref 5–8)
PH VENOUS: 7.32 (ref 7.35–7.45)
PLATELET # BLD: 183 K/UL (ref 135–450)
PMV BLD AUTO: 9.6 FL (ref 5–10.5)
PO2, VEN: 81.4 MMHG (ref 25–40)
POTASSIUM REFLEX MAGNESIUM: 4.4 MMOL/L (ref 3.5–5.1)
PROTEIN UA: NEGATIVE MG/DL
RBC # BLD: 3.78 M/UL (ref 4–5.2)
SODIUM BLD-SCNC: 131 MMOL/L (ref 136–145)
SPECIFIC GRAVITY UA: 1.01 (ref 1–1.03)
TCO2 CALC VENOUS: 19 MMOL/L
TOTAL PROTEIN: 7.5 G/DL (ref 6.4–8.2)
URINE REFLEX TO CULTURE: ABNORMAL
URINE TYPE: ABNORMAL
UROBILINOGEN, URINE: 0.2 E.U./DL
WBC # BLD: 6.3 K/UL (ref 4–11)

## 2020-08-04 PROCEDURE — 2580000003 HC RX 258: Performed by: PHYSICIAN ASSISTANT

## 2020-08-04 PROCEDURE — 81003 URINALYSIS AUTO W/O SCOPE: CPT

## 2020-08-04 PROCEDURE — 82803 BLOOD GASES ANY COMBINATION: CPT

## 2020-08-04 PROCEDURE — 96372 THER/PROPH/DIAG INJ SC/IM: CPT

## 2020-08-04 PROCEDURE — 6370000000 HC RX 637 (ALT 250 FOR IP): Performed by: PHYSICIAN ASSISTANT

## 2020-08-04 PROCEDURE — 80053 COMPREHEN METABOLIC PANEL: CPT

## 2020-08-04 PROCEDURE — 99284 EMERGENCY DEPT VISIT MOD MDM: CPT

## 2020-08-04 PROCEDURE — 85025 COMPLETE CBC W/AUTO DIFF WBC: CPT

## 2020-08-04 RX ORDER — 0.9 % SODIUM CHLORIDE 0.9 %
1000 INTRAVENOUS SOLUTION INTRAVENOUS ONCE
Status: COMPLETED | OUTPATIENT
Start: 2020-08-04 | End: 2020-08-05

## 2020-08-04 RX ADMIN — SODIUM CHLORIDE 1000 ML: 9 INJECTION, SOLUTION INTRAVENOUS at 23:31

## 2020-08-04 RX ADMIN — INSULIN HUMAN 15 UNITS: 100 INJECTION, SOLUTION PARENTERAL at 23:29

## 2020-08-04 RX ADMIN — SODIUM CHLORIDE 1000 ML: 9 INJECTION, SOLUTION INTRAVENOUS at 22:51

## 2020-08-04 ASSESSMENT — ENCOUNTER SYMPTOMS
EYES NEGATIVE: 1
SHORTNESS OF BREATH: 0
BACK PAIN: 0
ABDOMINAL PAIN: 0
COLOR CHANGE: 0

## 2020-08-05 VITALS
WEIGHT: 153 LBS | TEMPERATURE: 98.4 F | RESPIRATION RATE: 15 BRPM | SYSTOLIC BLOOD PRESSURE: 137 MMHG | HEIGHT: 63 IN | OXYGEN SATURATION: 98 % | HEART RATE: 72 BPM | DIASTOLIC BLOOD PRESSURE: 72 MMHG | BODY MASS INDEX: 27.11 KG/M2

## 2020-08-05 LAB
GLUCOSE BLD-MCNC: 379 MG/DL
GLUCOSE BLD-MCNC: 379 MG/DL (ref 70–99)
GLUCOSE BLD-MCNC: 520 MG/DL (ref 70–99)
PERFORMED ON: ABNORMAL
PERFORMED ON: ABNORMAL

## 2020-08-05 PROCEDURE — 6370000000 HC RX 637 (ALT 250 FOR IP): Performed by: PHYSICIAN ASSISTANT

## 2020-08-05 PROCEDURE — 96374 THER/PROPH/DIAG INJ IV PUSH: CPT

## 2020-08-05 RX ADMIN — INSULIN HUMAN 5 UNITS: 100 INJECTION, SOLUTION PARENTERAL at 00:59

## 2020-08-05 NOTE — ED NOTES
Discharge instructions and follow up care reviewed with patient. Patient voiced understanding. Patient ambulated to private vehicle without difficulty.      Gopi Marc RN  08/05/20 0117

## 2020-08-05 NOTE — ED PROVIDER NOTES
[facility]  ED  EMERGENCY DEPARTMENT ENCOUNTER        Pt Name: Charisse Mackey  MRN: [id number]  [place] 1960  Date of evaluation: 8/4/2020  Provider: Ga Brownlee PA-C  PCP: Jean-Claude Maloney  ED Attending: Charbel Tena MD      This patient was not seen by the attending provider     History provided by the patient    CHIEF COMPLAINT:     Chief Complaint   Patient presents with    Hyperglycemia     States home  at approximatley 1900. States she is type 2 diabetic. States she only takes insulin in AM.         HISTORY OF PRESENT ILLNESS:      Charisse Mackey is a 63 y.o. female who arrives to the ED by private vehicle. She reports her neighbor brought her. The patient is here with hyperglycemia. She is a type II diabetic. She takes Lantus Solostar 25 units every morning, metformin twice daily, Jardiance daily and Victoza daily. The patient reports she has been compliant in all of her medications. Tonight for dinner she ate fried chicken, vegetables and 2 large pieces of corn bread. She drank Powerade 0. The patient states she checked her blood sugar and it read 566. This number concerned her. She arrives to the ED and denies any pain. She denies chest pain, coughing or shortness of breath. She denies GI upset. She states she does feel a little dizzy or \"off balance\" which is typical for her when her blood sugar gets high. She follows with Edwin Torres. Blood work in July 2020 revealed a hemoglobin A1c of 9.0. Nursing Notes were reviewed     REVIEW OF SYSTEMS:     Review of Systems   Constitutional: Negative for appetite change, chills and fever. HENT: Negative. Eyes: Negative. Respiratory: Negative for shortness of breath. Cardiovascular: Negative for chest pain. Gastrointestinal: Negative for abdominal pain. Genitourinary: Negative. Musculoskeletal: Negative for back pain and neck pain. Skin: Negative for color change.    Neurological: Positive for dizziness. Negative for weakness and headaches. All other systems reviewed and are negative. Except as noted above in the ROS, all other systems were reviewed and negative. PAST MEDICAL HISTORY:     Past Medical History:   Diagnosis Date    Abnormal brain MRI 7/20/2017    Partially empty sella and minimal chronic small vessel ischemic disease    Acute bilateral low back pain without sciatica 11/2/2016    SHARRON (acute kidney injury) ([address] 7/5/2017    Arthritis     back    Bipolar disorder ([address] 10/18/2008    CAD (coronary artery disease)     stent placed 6/8/20    Cancer [facility]) 2015    bilateral breast:s/p lumpectomy/radiation:under care care of breast specialist:Dr. Boone     Carotid stenosis, bilateral:<50%:per US 7/2016 7/15/2016    Carpal tunnel syndrome 10/18/2008    Cervical cancer screening 2014    Nml per pt'.  Coronary artery disease of native artery of native heart with stable angina pectoris ([facility] 6/9/2020    DDD (degenerative disc disease), lumbar 7/18/2018    Depression     under care of pschiatrist:Dr. 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     Scoliosis     Spondylosis of lumbar region without myelopathy or radiculopathy 3/10/2017    Transient cerebral ischemia 07/15/2016    TIA:7/10/16    Unspecified cerebral artery occlusion with cerebral infarction     TIA         SURGICAL HISTORY:      Past Surgical History: Procedure Laterality Date    BREAST LUMPECTOMY  2015    Bilateral:breast cancer    CARDIAC CATHETERIZATION  06/08/2020    Dr. Suzi Camarillo), DAYANA to Diag 1    HYSTERECTOMY      Benign:no cervical cancer per pt'    KIDNEY REMOVAL      right    OTHER SURGICAL HISTORY Right     orif right ankle    TUBAL LIGATION           CURRENT MEDICATIONS:       Previous Medications    ASPIRIN 81 MG TABLET    Take 81 mg by mouth daily    BLOOD GLUCOSE TEST STRIPS (TRUE METRIX BLOOD GLUCOSE TEST) STRIP    USE AS DIRECTED TO TEST 4 TIMES A DAY    BLOOD PRESSURE MONITOR GINA    Check BP at home once or twice a day    CALCIUM CARBONATE-VITAMIN D (CALTRATE) 600-400 MG-UNIT TABS PER TAB        CETIRIZINE (ZYRTEC) 10 MG TABLET    Take 10 mg by mouth daily    CLONAZEPAM (KLONOPIN) 1 MG TABLET    Take 1 mg by mouth as needed. Estela Fischer EMPAGLIFLOZIN-METFORMIN HCL 12.5-1000 MG TABS    Take 2 tablets by mouth daily    GABAPENTIN (NEURONTIN) 600 MG TABLET    Take 600 mg by mouth 3 times daily    GLUCAGON 1 MG INJECTION    Inject 1 mg into the muscle See Admin Instructions Follow package directions for low blood sugar. INSULIN GLARGINE (LANTUS SOLOSTAR) 100 UNIT/ML INJECTION PEN    Inject 22 Units into the skin every morning    INSULIN PEN NEEDLE (UNIFINE PENTIPS) 32G X 4 MM MISC    USE AS DIRECTED 3 TIMES A DAY    INSULIN SYRINGE-NEEDLE U-100 31G X 5/16\" 0.3 ML MISC    USE 3 TIMES A DAY BEFORE MEALS    LETROZOLE (FEMARA) 2.5 MG TABLET    TAKE 1 TABLET BY MOUTH EVERY DAY    LIRAGLUTIDE (VICTOZA) 18 MG/3ML SOPN SC INJECTION    Inject 1.2 mg into the skin daily    LISINOPRIL (PRINIVIL;ZESTRIL) 10 MG TABLET    Take 10 mg by mouth daily    NITROGLYCERIN (NITROSTAT) 0.4 MG SL TABLET    up to max of 3 total doses. If no relief after 1 dose, call 911. OXYCODONE-ACETAMINOPHEN (PERCOCET) 5-325 MG PER TABLET    Take 1 tablet by mouth every 6 hours as needed for Pain. Estela Fischer     PALIPERIDONE (INVEGA) 9 MG EXTENDED RELEASE TABLET    Take 9 mg by mouth every morning     PANTOPRAZOLE (PROTONIX) 40 MG TABLET    Take 1 tablet by mouth every morning (before breakfast)    SIMVASTATIN (ZOCOR) 20 MG TABLET    Take 20 mg by mouth nightly    TICAGRELOR (BRILINTA) 90 MG TABS TABLET    Take 1 tablet by mouth 2 times daily    TRAZODONE (DESYREL) 100 MG TABLET    Take 100 mg by mouth nightly         ALLERGIES:    Morphine; Penicillins;  Codeine; and Penicillin g    FAMILY HISTORY:       Family History   Problem Relation Age of Onset    Cancer Mother         breast    Cancer Father     Heart Failure Neg Hx     High Cholesterol Neg Hx     Hypertension Neg Hx     Migraines Neg Hx     Rashes/Skin Problems Neg Hx     Seizures Neg Hx     Stroke Neg Hx     Thyroid Disease Neg Hx     Diabetes Neg Hx           SOCIAL HISTORY:       Social History     Socioeconomic History    Marital status:      Spouse name: None    Number of children: None    Years of education: None    Highest education level: None   Occupational History    Occupation:    Social Needs    Financial resource strain: None    Food insecurity     Worry: None     Inability: None    Transportation needs     Medical: None     Non-medical: None   Tobacco Use    Smoking status: Former Smoker     Packs/day: 0.50     Years: 20.00     Pack years: 10.00     Types: Cigarettes, Cigars     Last attempt to quit: 7/3/2014     Years since quittin.0    Smokeless tobacco: Never Used   Substance and Sexual Activity    Alcohol use: No     Alcohol/week: 0.0 standard drinks    Drug use: No    Sexual activity: Never   Lifestyle    Physical activity     Days per week: None     Minutes per session: None    Stress: None   Relationships    Social connections     Talks on phone: None     Gets together: None     Attends Baptism service: None     Active member of club or organization: None     Attends meetings of clubs or organizations: None     Relationship status: None    Intimate partner fL    Neutrophils % 59.9 %    Lymphocytes % 32.4 %    Monocytes % 6.7 %    Eosinophils % 0.8 %    Basophils % 0.2 %    Neutrophils Absolute 3.8 1.7 - 7.7 K/uL    Lymphocytes Absolute 2.0 1.0 - 5.1 K/uL    Monocytes Absolute 0.4 0.0 - 1.3 K/uL    Eosinophils Absolute 0.0 0.0 - 0.6 K/uL    Basophils Absolute 0.0 0.0 - 0.2 K/uL   Comprehensive Metabolic Panel w/ Reflex to MG   Result Value Ref Range    Sodium 131 (L) 136 - 145 mmol/L    Potassium reflex Magnesium 4.4 3.5 - 5.1 mmol/L    Chloride 96 (L) 99 - 110 mmol/L    CO2 20 (L) 21 - 32 mmol/L    Anion Gap 15 3 - 16    Glucose 662 (HH) 70 - 99 mg/dL    BUN 17 7 - 20 mg/dL    CREATININE 1.2 0.6 - 1.2 mg/dL    GFR Non- 46 (A) >60    GFR  55 (A) >60    Calcium 9.2 8.3 - 10.6 mg/dL    Total Protein 7.5 6.4 - 8.2 g/dL    Alb 4.0 3.4 - 5.0 g/dL    Albumin/Globulin Ratio 1.1 1.1 - 2.2    Total Bilirubin <0.2 0.0 - 1.0 mg/dL    Alkaline Phosphatase 201 (H) 40 - 129 U/L    ALT 43 (H) 10 - 40 U/L    AST 35 15 - 37 U/L    Globulin 3.5 g/dL   Urinalysis Reflex to Culture    Specimen: Urine, clean catch   Result Value Ref Range    Color, UA Yellow Straw/Yellow    Clarity, UA Clear Clear    Glucose, Ur >=1000 (A) Negative mg/dL    Bilirubin Urine Negative Negative    Ketones, Urine Negative Negative mg/dL    Specific Gravity, UA 1.010 1.005 - 1.030    Blood, Urine Negative Negative    pH, UA 5.5 5.0 - 8.0    Protein, UA Negative Negative mg/dL    Urobilinogen, Urine 0.2 <2.0 E.U./dL    Nitrite, Urine Negative Negative    Leukocyte Esterase, Urine Negative Negative    Microscopic Examination Not Indicated     Urine Type NotGiven     Urine Reflex to Culture Not Indicated    Blood gas, venous   Result Value Ref Range    pH, Kyle 7.315 (L) 7.350 - 7.450    pCO2, Ykle 36.0 (L) 40.0 - 50.0 mmHg    pO2, Kyle 81.4 (H) 25.0 - 40.0 mmHg    HCO3, Venous 17.9 (L) 23.0 - 29.0 mmol/L    Base Excess, Kyle -7.5 (L) -3.0 - 3.0 mmol/L    O2 Sat, Kyle 95 Not Established % Carboxyhemoglobin 1.7 (H) 0.0 - 1.5 %    MetHgb, Kyle 0.6 <1.5 %    TC02 (Calc), Kyle 19 Not Established mmol/L    O2 Content, Kyle 15 Not Established VOL %    O2 Therapy Unknown    POCT Glucose   Result Value Ref Range    POC Glucose 587 (H) 70 - 99 mg/dl    Performed on ACCU-CHEK    POCT glucose   Result Value Ref Range    Glucose 379 mg/dL   POCT Glucose   Result Value Ref Range    POC Glucose 520 (H) 70 - 99 mg/dl    Performed on ACCU-CHEK    POCT Glucose   Result Value Ref Range    POC Glucose 379 (H) 70 - 99 mg/dl    Performed on ACCU-CHEK          RADIOLOGY:  All x-ray studies areviewed/reviewed by me. Formal interpretations per the radiologist are as follows:      Xr Chest Portable    Result Date: 7/11/2020  EXAMINATION: ONE XRAY VIEW OF THE CHEST 7/11/2020 11:09 am COMPARISON: 06/05/2020 HISTORY: Reason for exam:->chest pain r/o ptx FINDINGS: The lungs are without acute focal process. No effusion or pneumothorax. The cardiomediastinal silhouette is normal.  Stable thoracic scoliosis.      Negative single view chest.         PROCEDURES:   N/A    CRITICAL CARE TIME:       None    CONSULTS:  None      EMERGENCY DEPARTMENT COURSE and DIFFERENTIAL DIAGNOSIS/MDM:   Vitals:    Vitals:    08/04/20 2213 08/04/20 2241 08/04/20 2311 08/05/20 0104   BP: 136/67 128/67 130/63 (!) 144/70   Pulse: 78 78 74 74   Resp: 14 17 16 22   Temp: 99.1 °F (37.3 °C)      TempSrc: Oral      SpO2: 98% 94% 92% 99%   Weight: 153 lb (69.4 kg)      Height: 5' 3\" (1.6 m)          Patient was given the following medications:  Medications   0.9 % sodium chloride bolus (0 mLs Intravenous Stopped 8/5/20 0138)   0.9 % sodium chloride bolus (0 mLs Intravenous Stopped 8/5/20 0138)   insulin regular (HUMULIN R;NOVOLIN R) injection 15 Units (15 Units Subcutaneous Given 8/4/20 3925)   insulin regular (HUMULIN R;NOVOLIN R) injection 5 Units (5 Units Intravenous Given 8/5/20 0059)       I have evaluated this patient in the ED  Old records were reviewed. She is a poorly controlled type II diabetic who noted her blood sugar to be in the 500s tonight. She admittedly ate a meal that was likely the cause of the extra high sugar. She reports compliance and all the medication she takes. I reviewed her old labs and found in July her hemoglobin A1c was 9.0. Given her complaint of hyperglycemia labs and urine are ordered on the patient. While in the ED she received a total of 2 L of normal saline IV, regular insulin 15 units subcutaneously and regular insulin 5 units IV. Initial Accu-Chek 587  CBC reveals white count of 6.3.  H&H 10.4 and 34.0  CMP with mild hyponatremia 131 and chloride 96. Glucose is 662. BUN 17 creatinine 1.2. VBG with venous pH 7.315. Urinalysis reveals glucose >= 1000. No sign of infection  After 1 L normal saline and 15 units of subcutaneous insulin blood sugar is down from 662-520  Another 1 L of normal saline is given along with 5 units of IV insulin and blood sugar is down to 379. I got the patient up and ambulated her to the bathroom. She reports feeling better. She reports feeling well enough to go home. She has a scheduled appointment with primary care tomorrow, on 8/6/2020. I told him to speak with her about her hyperglycemia and try to get a better control of her blood sugar. She will be given diabetic diet recommendations and ultimately discharged in improved and stable condition. I estimate there is LOW risk for SEPSIS, ACUTE CORONARY SYNDROME, MALIGNANT DYSRHYTHMIA or HYPERTENSION, PULMONARY EMBOLISM, THORACIC AORTIC DISSECTION, INTRACRANIAL HEMORRHAGE, SUBARACHNOID HEMORRHAGE, SUBDURAL HEMATOMA or STROKE, thus I consider the discharge disposition reasonable. Charisse Mackey and I have discussed the diagnosis and risks, and we agree with discharging home to follow-up with their primary doctor. We also discussed returning to the Emergency Department immediately if new or worsening symptoms occur.  We have

## 2020-08-06 ENCOUNTER — OFFICE VISIT (OUTPATIENT)
Dept: PRIMARY CARE CLINIC | Age: 60
End: 2020-08-06
Payer: MEDICAID

## 2020-08-06 ENCOUNTER — OFFICE VISIT (OUTPATIENT)
Dept: ENDOCRINOLOGY | Age: 60
End: 2020-08-06
Payer: MEDICAID

## 2020-08-06 VITALS
BODY MASS INDEX: 26.08 KG/M2 | OXYGEN SATURATION: 99 % | SYSTOLIC BLOOD PRESSURE: 145 MMHG | WEIGHT: 147.2 LBS | DIASTOLIC BLOOD PRESSURE: 69 MMHG | TEMPERATURE: 97.9 F | HEIGHT: 63 IN | HEART RATE: 70 BPM

## 2020-08-06 PROCEDURE — 1036F TOBACCO NON-USER: CPT | Performed by: INTERNAL MEDICINE

## 2020-08-06 PROCEDURE — G8427 DOCREV CUR MEDS BY ELIG CLIN: HCPCS | Performed by: INTERNAL MEDICINE

## 2020-08-06 PROCEDURE — 3017F COLORECTAL CA SCREEN DOC REV: CPT | Performed by: INTERNAL MEDICINE

## 2020-08-06 PROCEDURE — G8428 CUR MEDS NOT DOCUMENT: HCPCS | Performed by: NURSE PRACTITIONER

## 2020-08-06 PROCEDURE — G8417 CALC BMI ABV UP PARAM F/U: HCPCS | Performed by: NURSE PRACTITIONER

## 2020-08-06 PROCEDURE — 99214 OFFICE O/P EST MOD 30 MIN: CPT | Performed by: INTERNAL MEDICINE

## 2020-08-06 PROCEDURE — 99211 OFF/OP EST MAY X REQ PHY/QHP: CPT | Performed by: NURSE PRACTITIONER

## 2020-08-06 PROCEDURE — 2022F DILAT RTA XM EVC RTNOPTHY: CPT | Performed by: INTERNAL MEDICINE

## 2020-08-06 PROCEDURE — 3051F HG A1C>EQUAL 7.0%<8.0%: CPT | Performed by: INTERNAL MEDICINE

## 2020-08-06 PROCEDURE — G8417 CALC BMI ABV UP PARAM F/U: HCPCS | Performed by: INTERNAL MEDICINE

## 2020-08-06 RX ORDER — LIRAGLUTIDE 6 MG/ML
1.8 INJECTION SUBCUTANEOUS DAILY
Qty: 3 PEN | Refills: 5 | Status: SHIPPED | OUTPATIENT
Start: 2020-08-06 | End: 2021-03-09

## 2020-08-06 NOTE — PROGRESS NOTES
Patient ID:   Abraham Owusu is a 63 y.o. female  Chief Complaint:   Abraham Owusu presents for an evaluation of Type 2 Diabetes Mellitus , Hyperlipidemia and hypertension. Subjective:   Type 2 Diabetes Mellitus diagnosed around 2010  On insulin since 2012  Previous regimen:Taking Admelog 15 units tidac and 5 units for snacks. Basaglar 40 units once a day at night. VGO stopped due to hypoglycemias     Humalog stopped as she was missing lantus dose with Humalogs     She brought in her meds today. She is losing weight. In June 2020 Karene Torri and amaryl was stopped   Metformin and jardiance were change to synjardy but she did not      Lantus 25 units daily in am   Leon MULTANI approved but no pick as per EHR   Victoza 1.8 mg daily in AM      Admits making poor dietary choices, trying to cut down fried. Multiple cancellations/no shows      Hospitalization July 2020 for high blood sugars. she thinks she was eating too much carbs     Checks blood sugars 0-1 times per day. Reviewed [phone], > Hi   AM:     Lunch:   Supper:    HS:        Hypoglycemias: Once in last 4 weeks      Meals: 3, dinner is big. Snacks (jellos, fruits, pretzels) after every meal because she is hungry. Exercise: None    Denies chest pain, exertional dyspnea. Family history of CAD: Both parents  Denies smoking/ alcohol.    Currently on  mg daily     The following portions of the patient's history were reviewed and updated as appropriate:       Family History   Problem Relation Age of Onset    Cancer Mother         breast    Cancer Father     Heart Failure Neg Hx     High Cholesterol Neg Hx     Hypertension Neg Hx     Migraines Neg Hx     Rashes/Skin Problems Neg Hx     Seizures Neg Hx     Stroke Neg Hx     Thyroid Disease Neg Hx     Diabetes Neg Hx          Social History     Socioeconomic History    Marital status:      Spouse name: Not on file    Number of children: Not on file    Years of tunnel syndrome 10/18/2008    Cervical cancer screening 2014    Nml per pt'.  Coronary artery disease of native artery of native heart with stable angina pectoris ([facility] [address] 6/9/2020    DDD (degenerative disc disease), lumbar 7/18/2018    Depression     under care of pschiatrist:Dr. 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     Scoliosis     Spondylosis of lumbar region without myelopathy or radiculopathy 3/10/2017    Transient cerebral ischemia 07/15/2016    TIA:7/10/16    Unspecified cerebral artery occlusion with cerebral infarction     TIA       Past Surgical History:   Procedure Laterality Date    BREAST LUMPECTOMY  2015    Bilateral:breast cancer    CARDIAC CATHETERIZATION  06/08/2020    Dr. Anabela Bustos), DAYANA to Diag 1    HYSTERECTOMY      Benign:no cervical cancer per pt'    KIDNEY REMOVAL      right    OTHER SURGICAL HISTORY Right     orif right ankle    TUBAL LIGATION           Allergies   Allergen Reactions    Morphine Anaphylaxis and Hives     feels like throat is closing    Penicillins Hives and Swelling    Codeine Hives and Rash    Penicillin G Rash         Current Outpatient Medications:     Liraglutide (VICTOZA) 18 MG/3ML SOPN SC injection, Inject 1.8 mg into the skin daily, Disp: 3 pen, Rfl: 5    blood glucose test strips (TRUE METRIX BLOOD GLUCOSE TEST) strip, USE AS DIRECTED TO TEST 4 TIMES A DAY, Disp: 100 strip, Rfl: 5    pantoprazole (PROTONIX) 40 MG tablet, Take 1 tablet by mouth every morning (before breakfast), Disp: 30 tablet, Rfl: 3    nitroGLYCERIN (NITROSTAT) 0.4 MG SL tablet, up to max of 3 total doses. If no relief after 1 dose, call 911., Disp: 25 tablet, Rfl: 3    Insulin Pen Needle (UNIFINE PENTIPS) 32G X 4 MM MISC, USE AS DIRECTED 3 TIMES A DAY, Disp: 100 each, Rfl: 6    ticagrelor (BRILINTA) 90 MG TABS tablet, Take 1 tablet by mouth 2 times daily, Disp: 60 tablet, Rfl: 11    simvastatin (ZOCOR) 20 MG tablet, Take 20 mg by mouth nightly, Disp: , Rfl:     cetirizine (ZYRTEC) 10 MG tablet, Take 10 mg by mouth daily, Disp: , Rfl:     lisinopril (PRINIVIL;ZESTRIL) 10 MG tablet, Take 10 mg by mouth daily, Disp: , Rfl:     Empagliflozin-metFORMIN HCl 12.5-1000 MG TABS, Take 2 tablets by mouth daily, Disp: 60 tablet, Rfl: 5    Blood Pressure Monitor GINA, Check BP at home once or twice a day, Disp: 1 Device, Rfl: 0    insulin glargine (LANTUS SOLOSTAR) 100 UNIT/ML injection pen, Inject 22 Units into the skin every morning (Patient taking differently: Inject 25 Units into the skin every morning ), Disp: 5 pen, Rfl: 3    Insulin Syringe-Needle U-100 31G X 5/16\" 0.3 ML MISC, USE 3 TIMES A DAY BEFORE MEALS, Disp: 90 each, Rfl: 7    calcium carbonate-vitamin D (CALTRATE) 600-400 MG-UNIT TABS per tab, , Disp: , Rfl:     letrozole (FEMARA) 2.5 MG tablet, TAKE 1 TABLET BY MOUTH EVERY DAY, Disp: , Rfl: 3    glucagon 1 MG injection, Inject 1 mg into the muscle See Admin Instructions Follow package directions for low blood sugar., Disp: 1 kit, Rfl: 0    clonazePAM (KLONOPIN) 1 MG tablet, Take 1 mg by mouth as needed.  ., Disp: , Rfl:     paliperidone (INVEGA) 9 MG extended release tablet, Take 9 mg by mouth every morning , Disp: , Rfl:     aspirin 81 MG tablet, Take 81 mg by mouth daily, Disp: , Rfl:     gabapentin (NEURONTIN) 600 MG tablet, Take 600 mg by mouth 3 times daily, Disp: , Rfl:    Effort normal and breath sounds normal.   Abdominal: Soft. Bowel sounds are normal.   Musculoskeletal: Normal range of motion. Neurological: Patient is alert and oriented to person, place, and time. Patient has normal reflexes. Skin: Skin is warm and dry. Psychiatric: Patient has a normal mood and affect.  Patient behavior is normal.     Lab Review:    Admission on 08/04/2020, Discharged on 08/05/2020   Component Date Value Ref Range Status    POC Glucose 08/04/2020 587* 70 - 99 mg/dl Final    Performed on 08/04/2020 ACCU-CHEK   Final    WBC 08/04/2020 6.3  4.0 - 11.0 K/uL Final    RBC 08/04/2020 3.78* 4.00 - 5.20 M/uL Final    Hemoglobin 08/04/2020 10.4* 12.0 - 16.0 g/dL Final    Hematocrit 08/04/2020 34.0* 36.0 - 48.0 % Final    MCV 08/04/2020 89.9  80.0 - 100.0 fL Final    MCH 08/04/2020 27.4  26.0 - 34.0 pg Final    MCHC 08/04/2020 30.5* 31.0 - 36.0 g/dL Final    RDW 08/04/2020 15.0  12.4 - 15.4 % Final    Platelets 53/57/3719 183  135 - 450 K/uL Final    MPV 08/04/2020 9.6  5.0 - 10.5 fL Final    Neutrophils % 08/04/2020 59.9  % Final    Lymphocytes % 08/04/2020 32.4  % Final    Monocytes % 08/04/2020 6.7  % Final    Eosinophils % 08/04/2020 0.8  % Final    Basophils % 08/04/2020 0.2  % Final    Neutrophils Absolute 08/04/2020 3.8  1.7 - 7.7 K/uL Final    Lymphocytes Absolute 08/04/2020 2.0  1.0 - 5.1 K/uL Final    Monocytes Absolute 08/04/2020 0.4  0.0 - 1.3 K/uL Final    Eosinophils Absolute 08/04/2020 0.0  0.0 - 0.6 K/uL Final    Basophils Absolute 08/04/2020 0.0  0.0 - 0.2 K/uL Final    Sodium 08/04/2020 131* 136 - 145 mmol/L Final    Potassium reflex Magnesium 08/04/2020 4.4  3.5 - 5.1 mmol/L Final    Chloride 08/04/2020 96* 99 - 110 mmol/L Final    CO2 08/04/2020 20* 21 - 32 mmol/L Final    Anion Gap 08/04/2020 15  3 - 16 Final    Glucose 08/04/2020 662* 70 - 99 mg/dL Final    BUN 08/04/2020 17  7 - 20 mg/dL Final    CREATININE 08/04/2020 1.2  0.6 - 1.2 mg/dL Final    GFR Non- 08/04/2020 46* >60 Final    GFR  08/04/2020 55* >60 Final    Calcium 08/04/2020 9.2  8.3 - 10.6 mg/dL Final    Total Protein 08/04/2020 7.5  6.4 - 8.2 g/dL Final    Alb 08/04/2020 4.0  3.4 - 5.0 g/dL Final    Albumin/Globulin Ratio 08/04/2020 1.1  1.1 - 2.2 Final    Total Bilirubin 08/04/2020 <0.2  0.0 - 1.0 mg/dL Final    Alkaline Phosphatase 08/04/2020 201* 40 - 129 U/L Final    ALT 08/04/2020 43* 10 - 40 U/L Final    AST 08/04/2020 35  15 - 37 U/L Final    Globulin 08/04/2020 3.5  g/dL Final    Color, UA 08/04/2020 Yellow  Straw/Yellow Final    Clarity, UA 08/04/2020 Clear  Clear Final    Glucose, Ur 08/04/2020 >=1000* Negative mg/dL Final    Bilirubin Urine 08/04/2020 Negative  Negative Final    Ketones, Urine 08/04/2020 Negative  Negative mg/dL Final    Specific Gravity, UA 08/04/2020 1.010  1.005 - 1.030 Final    Blood, Urine 08/04/2020 Negative  Negative Final    pH, UA 08/04/2020 5.5  5.0 - 8.0 Final    Protein, UA 08/04/2020 Negative  Negative mg/dL Final    Urobilinogen, Urine 08/04/2020 0.2  <2.0 E.U./dL Final    Nitrite, Urine 08/04/2020 Negative  Negative Final    Leukocyte Esterase, Urine 08/04/2020 Negative  Negative Final    Microscopic Examination 08/04/2020 Not Indicated   Final    Urine Type 08/04/2020 NotGiven   Final    Urine Reflex to Culture 08/04/2020 Not Indicated   Final    pH, Kyle 08/04/2020 7.315* 7.350 - 7.450 Final    pCO2, Kyle 08/04/2020 36.0* 40.0 - 50.0 mmHg Final    pO2, Kyle 08/04/2020 81.4* 25.0 - 40.0 mmHg Final    HCO3, Venous 08/04/2020 17.9* 23.0 - 29.0 mmol/L Final    Base Excess, Kyle 08/04/2020 -7.5* -3.0 - 3.0 mmol/L Final    O2 Sat, Kyle 08/04/2020 95  Not Established % Final    Carboxyhemoglobin 08/04/2020 1.7* 0.0 - 1.5 % Final    MetHgb, [facility] 08/04/2020 0.6  <1.5 % Final    TC02 (Calc), [facility] 08/04/2020 19  Not Established mmol/L Final    O2 Content, [facility] 08/04/2020 15  Not Established VOL % Final    O2 Therapy 08/04/2020 Unknown   Final    Glucose 08/05/2020 379  mg/dL Final    POC Glucose 08/05/2020 520* 70 - 99 mg/dl Final    Performed on 08/05/2020 ACCU-CHEK   Final    POC Glucose 08/05/2020 379* 70 - 99 mg/dl Final    Performed on 08/05/2020 ACCU-CHEK   Final   Admission on 07/10/2020, Discharged on 07/11/2020   Component Date Value Ref Range Status    Troponin 07/10/2020 <0.01  <0.01 ng/mL Final    Troponin 07/11/2020 <0.01  <0.01 ng/mL Final    WBC 07/11/2020 4.7  4.0 - 11.0 K/uL Final    RBC 07/11/2020 3.65* 4.00 - 5.20 M/uL Final    Hemoglobin 07/11/2020 10.4* 12.0 - 16.0 g/dL Final    Hematocrit 07/11/2020 33.0* 36.0 - 48.0 % Final    MCV 07/11/2020 90.4  80.0 - 100.0 fL Final    MCH 07/11/2020 28.5  26.0 - 34.0 pg Final    MCHC 07/11/2020 31.5  31.0 - 36.0 g/dL Final    RDW 07/11/2020 14.1  12.4 - 15.4 % Final    Platelets 51/04/0755 172  135 - 450 K/uL Final    MPV 07/11/2020 9.9  5.0 - 10.5 fL Final    POC Glucose 07/10/2020 352* 70 - 99 mg/dl Final    Performed on 07/10/2020 ACCU-CHEK   Final    Ventricular Rate 07/11/2020 55  BPM Final    Atrial Rate 07/11/2020 55  BPM Final    P-R Interval 07/11/2020 156  ms Final    QRS Duration 07/11/2020 70  ms Final    Q-T Interval 07/11/2020 440  ms Final    QTc Calculation (Bazett) 07/11/2020 420  ms Final    P Axis 07/11/2020 41  degrees Final    R Axis 07/11/2020 -14  degrees Final    T Axis 07/11/2020 54  degrees Final    Diagnosis 07/11/2020 Sinus bradycardiaOtherwise normal ECGWhen compared with ECG of 08-JUN-2020 12:22,No significant change was foundConfirmed by Debbie Ventura ([id number]) on 7/11/2020 10:12:54 AM   Final    POC Glucose 07/11/2020 299* 70 - 99 mg/dl Final    Performed on 07/11/2020 ACCU-CHEK   Final    POC Glucose 07/11/2020 152* 70 - 99 mg/dl Final    Performed on 07/11/2020 ACCU-CHEK   Final    POC Glucose 07/11/2020 293* 70 - 99 mg/dl Final    Performed on 07/11/2020 ACCU-CHEK   Final   Orders Only on 06/25/2020   Component Date Value Ref Range Status    Cholesterol, Total 06/25/2020 96  0 - 199 mg/dL Final    Triglycerides 06/25/2020 118  0 - 150 mg/dL Final    HDL 06/25/2020 39* 40 - 60 mg/dL Final    LDL Calculated 06/25/2020 33  <100 mg/dL Final    VLDL Cholesterol Calculated 06/25/2020 24  Not Established mg/dL Final    Sodium 06/25/2020 138  136 - 145 mmol/L Final    Potassium 06/25/2020 4.8  3.5 - 5.1 mmol/L Final    Chloride 06/25/2020 103  99 - 110 mmol/L Final    CO2 06/25/2020 19* 21 - 32 mmol/L Final    Anion Gap 06/25/2020 16  3 - 16 Final    Glucose 06/25/2020 271* 70 - 99 mg/dL Final    BUN 06/25/2020 20  7 - 20 mg/dL Final    CREATININE 06/25/2020 1.2  0.6 - 1.2 mg/dL Final    GFR Non- 06/25/2020 46* >60 Final    GFR  06/25/2020 55* >60 Final    Calcium 06/25/2020 9.2  8.3 - 10.6 mg/dL Final    Total Protein 06/25/2020 7.5  6.4 - 8.2 g/dL Final    Alb 06/25/2020 4.2  3.4 - 5.0 g/dL Final    Albumin/Globulin Ratio 06/25/2020 1.3  1.1 - 2.2 Final    Total Bilirubin 06/25/2020 <0.2  0.0 - 1.0 mg/dL Final    Alkaline Phosphatase 06/25/2020 178* 40 - 129 U/L Final    ALT 06/25/2020 32  10 - 40 U/L Final    AST 06/25/2020 21  15 - 37 U/L Final    Globulin 06/25/2020 3.3  g/dL Final    Hemoglobin A1C 06/25/2020 7.3  See comment % Final    eAG 06/25/2020 162.8  mg/dL Final    TSH Reflex FT4 06/25/2020 0.78  0.27 - 4.20 uIU/mL Final    Microalbumin, Random Urine 06/25/2020 3.00* <2.0 mg/dL Final    Creatinine, Ur 06/25/2020 75.0  28.0 - 259.0 mg/dL Final    Microalbumin Creatinine Ratio 06/25/2020 40.0* 0.0 - 30.0 mg/g Final   No results displayed because visit has over 200 results.       Admission on 04/22/2020, Discharged on 04/22/2020   Component Date Value Ref Range Status    POC Glucose 04/22/2020 454* 70 - 99 mg/dl Final    Performed on 04/22/2020 ACCU-CHEK   Final    WBC 04/22/2020 5.8  4.0 - 11.0 K/uL Final    RBC 04/22/2020 3.81* 4.00 - 5.20 M/uL Final    Hemoglobin 04/22/2020 10.8* 12.0 - 16.0 g/dL Final    Hematocrit 04/22/2020 34.2* 36.0 - 48.0 % Final    MCV 04/22/2020 89.9  80.0 - 100.0 fL Final    MCH 04/22/2020 28.3  26.0 - 34.0 pg Final    MCHC 04/22/2020 31.4  31.0 - 36.0 g/dL Final    RDW 04/22/2020 13.7  12.4 - 15.4 % Final    Platelets 00/22/1135 168  135 - 450 K/uL Final    MPV 04/22/2020 9.5  5.0 - 10.5 fL Final    Neutrophils % 04/22/2020 66.5  % Final    Lymphocytes % 04/22/2020 27.0  % Final    Monocytes % 04/22/2020 4.9  % Final    Eosinophils % 04/22/2020 1.1  % Final    Basophils % 04/22/2020 0.5  % Final    Neutrophils Absolute 04/22/2020 3.9  1.7 - 7.7 K/uL Final    Lymphocytes Absolute 04/22/2020 1.6  1.0 - 5.1 K/uL Final    Monocytes Absolute 04/22/2020 0.3  0.0 - 1.3 K/uL Final    Eosinophils Absolute 04/22/2020 0.1  0.0 - 0.6 K/uL Final    Basophils Absolute 04/22/2020 0.0  0.0 - 0.2 K/uL Final    Sodium 04/22/2020 135* 136 - 145 mmol/L Final    Potassium 04/22/2020 4.4  3.5 - 5.1 mmol/L Final    Chloride 04/22/2020 96* 99 - 110 mmol/L Final    CO2 04/22/2020 24  21 - 32 mmol/L Final    Anion Gap 04/22/2020 15  3 - 16 Final    Glucose 04/22/2020 468* 70 - 99 mg/dL Final    BUN 04/22/2020 23* 7 - 20 mg/dL Final    CREATININE 04/22/2020 1.5* 0.6 - 1.2 mg/dL Final    GFR Non- 04/22/2020 35* >60 Final    GFR  04/22/2020 43* >60 Final    Calcium 04/22/2020 9.6  8.3 - 10.6 mg/dL Final    Total Protein 04/22/2020 8.0  6.4 - 8.2 g/dL Final    Alb 04/22/2020 4.3  3.4 - 5.0 g/dL Final    Albumin/Globulin Ratio 04/22/2020 1.2  1.1 - 2.2 Final    Total Bilirubin 04/22/2020 <0.2  0.0 - 1.0 mg/dL Final    Alkaline Phosphatase 04/22/2020 142* 40 - 129 U/L Final    ALT 04/22/2020 25  10 - 40 U/L Final    AST 04/22/2020 16  15 - 37 U/L Final    Globulin 04/22/2020 3.7  g/dL Final    Color, UA 04/22/2020 Straw  Straw/Yellow Final    Clarity, UA 04/22/2020 Clear Clear Final    Glucose, Ur 04/22/2020 >=1000* Negative mg/dL Final    Bilirubin Urine 04/22/2020 Negative  Negative Final    Ketones, Urine 04/22/2020 Negative  Negative mg/dL Final    Specific Gravity, UA 04/22/2020 1.010  1.005 - 1.030 Final    Blood, Urine 04/22/2020 Negative  Negative Final    pH, UA 04/22/2020 5.5  5.0 - 8.0 Final    Protein, UA 04/22/2020 Negative  Negative mg/dL Final    Urobilinogen, Urine 04/22/2020 0.2  <2.0 E.U./dL Final    Nitrite, Urine 04/22/2020 Negative  Negative Final    Leukocyte Esterase, Urine 04/22/2020 Negative  Negative Final    Microscopic Examination 04/22/2020 Not Indicated   Final    Urine Type 04/22/2020 NotGiven   Final    pH, [facility] 04/22/2020 7.258* 7.350 - 7.450 Final    pCO2, [facility] 04/22/2020 57.7* 40.0 - 50.0 mmHg Final    pO2, [place] 04/22/2020 36.6  25.0 - 40.0 mmHg Final    HCO3, Venous 04/22/2020 25.2  23.0 - 29.0 mmol/L Final    Base Excess, [facility] 04/22/2020 -2.6  -3.0 - 3.0 mmol/L Final    O2 Sat, [facility] 04/22/2020 64  Not Established % Final    Carboxyhemoglobin 04/22/2020 1.2  0.0 - 1.5 % Final    MetHgb, [facility] 04/22/2020 0.4  <1.5 % Final    TC02 (Calc), [facility] 04/22/2020 27  Not Established mmol/L Final    O2 Content, [facility] 04/22/2020 10  Not Established VOL % Final    O2 Therapy 04/22/2020 Unknown   Final    Troponin 04/22/2020 <0.01  <0.01 ng/mL Final    Pro-BNP 04/22/2020 107  0 - 124 pg/mL Final    Magnesium 04/22/2020 2.00  1.80 - 2.40 mg/dL Final    Phosphorus 04/22/2020 4.5  2.5 - 4.9 mg/dL Final    POC Glucose 04/22/2020 220* 70 - 99 mg/dl Final    Performed on 04/22/2020 ACCU-CHEK   Final    Sodium 04/22/2020 142  136 - 145 mmol/L Final    Potassium 04/22/2020 3.7  3.5 - 5.1 mmol/L Final    Chloride 04/22/2020 108  99 - 110 mmol/L Final    CO2 04/22/2020 23  21 - 32 mmol/L Final    Anion Gap 04/22/2020 11  3 - 16 Final    Glucose 04/22/2020 58* 70 - 99 mg/dL Final    BUN 04/22/2020 21* 7 - 20 mg/dL Final    CREATININE 04/22/2020  BUN 01/22/2020 15  7 - 20 mg/dL Final    CREATININE 01/22/2020 1.3* 0.6 - 1.2 mg/dL Final    GFR Non- 01/22/2020 42* >60 Final    GFR  01/22/2020 50* >60 Final    Calcium 01/22/2020 8.9  8.3 - 10.6 mg/dL Final    Total Protein 01/22/2020 7.1  6.4 - 8.2 g/dL Final    Alb 01/22/2020 3.9  3.4 - 5.0 g/dL Final    Albumin/Globulin Ratio 01/22/2020 1.2  1.1 - 2.2 Final    Total Bilirubin 01/22/2020 <0.2  0.0 - 1.0 mg/dL Final    Alkaline Phosphatase 01/22/2020 158* 40 - 129 U/L Final    ALT 01/22/2020 37  10 - 40 U/L Final    AST 01/22/2020 30  15 - 37 U/L Final    Globulin 01/22/2020 3.2  g/dL Final    Ventricular Rate 01/22/2020 68  BPM Final    Atrial Rate 01/22/2020 68  BPM Final    P-R Interval 01/22/2020 160  ms Final    QRS Duration 01/22/2020 74  ms Final    Q-T Interval 01/22/2020 400  ms Final    QTc Calculation (Bazett) 01/22/2020 425  ms Final    P Axis 01/22/2020 37  degrees Final    R Axis 01/22/2020 -11  degrees Final    T Axis 01/22/2020 41  degrees Final    Diagnosis 01/22/2020 Sinus rhythm with Premature atrial complexesNonspecific ST abnormalityWhen compared with ECG of 17-SEP-2017 07:35,Premature atrial complexes are now PresentConfirmed by BRIA Cao MD ([id number]) on 1/23/2020 1:44:57 PM   Final    Color, UA 01/22/2020 Yellow  Straw/Yellow Final    Clarity, UA 01/22/2020 Clear  Clear Final    Glucose, Ur 01/22/2020 >=1000* Negative mg/dL Final    Bilirubin Urine 01/22/2020 Negative  Negative Final    Ketones, Urine 01/22/2020 Negative  Negative mg/dL Final    Specific Gravity, UA 01/22/2020 <=1.005  1.005 - 1.030 Final    Blood, Urine 01/22/2020 Negative  Negative Final    pH, UA 01/22/2020 5.0  5.0 - 8.0 Final    Protein, UA 01/22/2020 Negative  Negative mg/dL Final    Urobilinogen, Urine 01/22/2020 0.2  <2.0 E.U./dL Final    Nitrite, Urine 01/22/2020 POSITIVE* Negative Final    Leukocyte Esterase, Urine 01/22/2020 Negative 01/23/2020 108  99 - 110 mmol/L Final    CO2 01/23/2020 23  21 - 32 mmol/L Final    Anion Gap 01/23/2020 9  3 - 16 Final    Glucose 01/23/2020 53* 70 - 99 mg/dL Final    BUN 01/23/2020 14  7 - 20 mg/dL Final    CREATININE 01/23/2020 1.1  0.6 - 1.2 mg/dL Final    GFR Non- 01/23/2020 51* >60 Final    GFR  01/23/2020 >60  >60 Final    Calcium 01/23/2020 8.4  8.3 - 10.6 mg/dL Final    Total Protein 01/23/2020 5.9* 6.4 - 8.2 g/dL Final    Alb 01/23/2020 3.1* 3.4 - 5.0 g/dL Final    Albumin/Globulin Ratio 01/23/2020 1.1  1.1 - 2.2 Final    Total Bilirubin 01/23/2020 <0.2  0.0 - 1.0 mg/dL Final    Alkaline Phosphatase 01/23/2020 126  40 - 129 U/L Final    ALT 01/23/2020 28  10 - 40 U/L Final    AST 01/23/2020 19  15 - 37 U/L Final    Globulin 01/23/2020 2.8  g/dL Final    WBC 01/23/2020 5.1  4.0 - 11.0 K/uL Final    RBC 01/23/2020 3.67* 4.00 - 5.20 M/uL Final    Hemoglobin 01/23/2020 10.1* 12.0 - 16.0 g/dL Final    Hematocrit 01/23/2020 31.0* 36.0 - 48.0 % Final    MCV 01/23/2020 84.5  80.0 - 100.0 fL Final    MCH 01/23/2020 27.6  26.0 - 34.0 pg Final    MCHC 01/23/2020 32.7  31.0 - 36.0 g/dL Final    RDW 01/23/2020 13.2  12.4 - 15.4 % Final    Platelets 05/46/7412 131* 135 - 450 K/uL Final    MPV 01/23/2020 10.5  5.0 - 10.5 fL Final    Neutrophils % 01/23/2020 39.3  % Final    Lymphocytes % 01/23/2020 50.7  % Final    Monocytes % 01/23/2020 7.7  % Final    Eosinophils % 01/23/2020 1.4  % Final    Basophils % 01/23/2020 0.9  % Final    Neutrophils Absolute 01/23/2020 2.0  1.7 - 7.7 K/uL Final    Lymphocytes Absolute 01/23/2020 2.6  1.0 - 5.1 K/uL Final    Monocytes Absolute 01/23/2020 0.4  0.0 - 1.3 K/uL Final    Eosinophils Absolute 01/23/2020 0.1  0.0 - 0.6 K/uL Final    Basophils Absolute 01/23/2020 0.0  0.0 - 0.2 K/uL Final    Organism 01/23/2020 Escherichia coli*  Final    Urine Culture, Routine 01/23/2020 >100,000 CFU/ml   Final    POC Glucose 01/23/2020 131* 70 - 99 mg/dl Final    Performed on 01/23/2020 ACCU-CHEK   Final    POC Glucose 01/23/2020 76  70 - 99 mg/dl Final    Performed on 01/23/2020 ACCU-CHEK   Final    POC Glucose 01/23/2020 108* 70 - 99 mg/dl Final    Performed on 01/23/2020 ACCU-CHEK   Final    Magnesium 01/23/2020 1.60* 1.80 - 2.40 mg/dL Final    POC Glucose 01/23/2020 362* 70 - 99 mg/dl Final    Performed on 01/23/2020 ACCU-CHEK   Final    POC Glucose 01/23/2020 228* 70 - 99 mg/dl Final    Performed on 01/23/2020 ACCU-CHEK   Final   Orders Only on 12/20/2019   Component Date Value Ref Range Status    Hemoglobin A1C 12/20/2019 8.4  See comment % Final    eAG 12/20/2019 194.4  mg/dL Final           Assessment and Plan     Annell Eisenmenger was seen today for diabetes. Diagnoses and all orders for this visit:    Uncontrolled type 2 diabetes mellitus with microalbuminuria, with long-term current use of insulin ([facility])  -     Liraglutide (VICTOZA) 18 MG/3ML SOPN SC injection; Inject 1.8 mg into the skin daily    Uncontrolled type 2 diabetes mellitus with diabetic nephropathy, with long-term current use of insulin ([facility])  -     Liraglutide (VICTOZA) 18 MG/3ML SOPN SC injection; Inject 1.8 mg into the skin daily    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 (HCC)  -     Liraglutide (VICTOZA) 18 MG/3ML SOPN SC injection; Inject 1.8 mg into the skin daily    Dyslipidemia associated with type 2 diabetes mellitus (HCC)  -     Liraglutide (VICTOZA) 18 MG/3ML SOPN SC injection; Inject 1.8 mg into the skin daily    Diabetic polyneuropathy associated with type 2 diabetes mellitus (HCC)  -     Liraglutide (VICTOZA) 18 MG/3ML SOPN SC injection; Inject 1.8 mg into the skin daily    Essential hypertension  -     Liraglutide (VICTOZA) 18 MG/3ML SOPN SC injection; Inject 1.8 mg into the skin daily    Microalbuminuria  -     Liraglutide (VICTOZA) 18 MG/3ML SOPN SC injection;  Inject 1.8 mg into the skin daily    Morbid obesity due to excess calories ([facility]  -     Liraglutide (VICTOZA) 18 MG/3ML SOPN SC injection; Inject 1.8 mg into the skin daily          1: Type 2 DM complicated with nephropathy, neuropathy TIAs, carotid stenosis   Uncontrolled A1C 7.3% < 8.4%<  6.8% < 10.1%  << 8.1 <  8% < 11.7%    Cr 1.2, GFR 55 April 2020   A1C of <8 would be acceptable because of microvascular and macrovascular complications. Questionable adherence to medical plan, treatment adherence and diet plan   Seamus Resendez not approved     Need to bring meds on every visit - she did not bring today     Keep Humalog off     Change Lantus to 30 units daily in am   START Synjardy, two tabs in AM   Victoza 1.8 mg daily in AM      All instructions provided in written. Check Blood sugars 4 times per day. Log them along with insulin and send them every 2 weeks. Call for blood sugars less than 60 or more than 400. Eye exam: Last exam in March 12th 2018, Needs an exam now. Foot exam:  March 2020    Deformity/amputation: absent  Skin lesions/pre-ulcerative calluses: absent  Edema: right- negative, left- negative  Sensory exam: Monofilament sensation: normal  Plus at least one of the following:   Pulses: normal,   Vibration (128 Hz):  Moderately decreased     Renal screen: High 68 Feb 2018, high 47 Jan 2019  > 40 June 2020     TSH screen: Feb 2018   Saw CDE in 2016 with Rd.     2: HTN   Controlled     3: Hyperlipidemia   LDL: 33, HDL: 39, TGs: 118 June 2020      Leg pains present   Continue simvastatin 20mg daily     4: Unintentional weigh tloss   TSH normal   Follow up with pcp     RTC in 6 weeks with logs    Labs today: None     EDUCATION:   Greater than 50% of this follow-up visit was spent in general counseling regarding   obesity, diet, exercise, importance of adherence to insulin regime, recognition and treatment of hypo and hyperglycemia,  glucose logging, proper diabetes management, diabetic complications with poor management and the importance of glycemic control in order to avoid the complications of diabetes. Risks and potential complications of diabetes were reviewed with the patient. Diabetes health maintenance plan and follow-up were discussed and understood by the patient. We reviewed the importance of medication compliance and regular follow-up. Aggressive lifestyle modification was encouraged. Exercise Counselling: This patient is a candidate for regular physical exercise. Instructions to perform the following types of exercise:  Swimming or water aerobic exercise  Brisk walking  Playing tennis  Stationary bicycle or elliptical indoor  Low impact aerobic exercise    Instructions given to exercise for the following duration:  30 minutes a day for five-seven days per week.     Following instructions for being active throughout the day in addition to formal exercise:  Walk instead of drive whenever possible  Take the stairs instead of the elevator  Work in the garden  Park to the far end of the parking lot to add more walking steps to destination      Electronically signed by Ro Bruce MD on 8/6/2020 at 2:22 PM

## 2020-08-06 NOTE — PROGRESS NOTES
Malik Aceves received a viral test for COVID-19. They were educated on isolation and quarantine as appropriate. For any symptoms, they were directed to seek care from their PCP, given contact information to establish with a doctor, directed to an urgent care or the emergency room.

## 2020-08-06 NOTE — PATIENT INSTRUCTIONS
Advance Care Planning  People with COVID-19 may have no symptoms, mild symptoms, such as fever, cough, and shortness of breath or they may have more severe illness, developing severe and fatal pneumonia. As a result, Advance Care Planning with attention to naming a health care decision maker (someone you trust to make healthcare decisions for you if you could not speak for yourself) and sharing other health care preferences is important BEFORE a possible health crisis. Please contact your Primary Care Provider to discuss Advance Care Planning. Preventing the Spread of Coronavirus Disease 2019 in Homes and Residential Communities  For the most recent information go to [email or web address]    Prevention steps for People with confirmed or suspected COVID-19 (including persons under investigation) who do not need to be hospitalized  and   People with confirmed COVID-19 who were hospitalized and determined to be medically stable to go home    Your healthcare provider and public health staff will evaluate whether you can be cared for at home. If it is determined that you do not need to be hospitalized and can be isolated at home, you will be monitored by staff from your local or state health department. You should follow the prevention steps below until a healthcare provider or local or state health department says you can return to your normal activities. Stay home except to get medical care  People who are mildly ill with COVID-19 are able to isolate at home during their illness. You should restrict activities outside your home, except for getting medical care. Do not go to work, school, or public areas. Avoid using public transportation, ride-sharing, or taxis. Separate yourself from other people and animals in your home  People: As much as possible, you should stay in a specific room and away from other people in your home.  Also, you should use a separate bathroom, if available. Animals: You should restrict contact with pets and other animals while you are sick with COVID-19, just like you would around other people. Although there have not been reports of pets or other animals becoming sick with COVID-19, it is still recommended that people sick with COVID-19 limit contact with animals until more information is known about the virus. When possible, have another member of your household care for your animals while you are sick. If you are sick with COVID-19, avoid contact with your pet, including petting, snuggling, being kissed or licked, and sharing food. If you must care for your pet or be around animals while you are sick, wash your hands before and after you interact with pets and wear a facemask. Call ahead before visiting your doctor  If you have a medical appointment, call the healthcare provider and tell them that you have or may have COVID-19. This will help the healthcare providers office take steps to keep other people from getting infected or exposed. Wear a facemask  You should wear a facemask when you are around other people (e.g., sharing a room or vehicle) or pets and before you enter a healthcare providers office. If you are not able to wear a facemask (for example, because it causes trouble breathing), then people who live with you should not stay in the same room with you, or they should wear a facemask if they enter your room. Cover your coughs and sneezes  Cover your mouth and nose with a tissue when you cough or sneeze. Throw used tissues in a lined trash can. Immediately wash your hands with soap and water for at least 20 seconds or, if soap and water are not available, clean your hands with an alcohol-based hand  that contains at least 60% alcohol.   Clean your hands often  Wash your hands often with soap and water for at least 20 seconds, especially after blowing your nose, coughing, or sneezing; going to the bathroom; and before eating or preparing food. If soap and water are not readily available, use an alcohol-based hand  with at least 60% alcohol, covering all surfaces of your hands and rubbing them together until they feel dry. Soap and water are the best option if hands are visibly dirty. Avoid touching your eyes, nose, and mouth with unwashed hands. Avoid sharing personal household items  You should not share dishes, drinking glasses, cups, eating utensils, towels, or bedding with other people or pets in your home. After using these items, they should be washed thoroughly with soap and water. Clean all high-touch surfaces everyday  High touch surfaces include counters, tabletops, doorknobs, bathroom fixtures, toilets, phones, keyboards, tablets, and bedside tables. Also, clean any surfaces that may have blood, stool, or body fluids on them. Use a household cleaning spray or wipe, according to the label instructions. Labels contain instructions for safe and effective use of the cleaning product including precautions you should take when applying the product, such as wearing gloves and making sure you have good ventilation during use of the product. Monitor your symptoms  Seek prompt medical attention if your illness is worsening (e.g., difficulty breathing). Before seeking care, call your healthcare provider and tell them that you have, or are being evaluated for, COVID-19. Put on a facemask before you enter the facility. These steps will help the healthcare providers office to keep other people in the office or waiting room from getting infected or exposed. Ask your healthcare provider to call the local or state health department. Persons who are placed under active monitoring or facilitated self-monitoring should follow instructions provided by their local health department or occupational health professionals, as appropriate. When working with your local health department check their available hours.   If you have a medical emergency and need to call 911, notify the dispatch personnel that you have, or are being evaluated for COVID-19. If possible, put on a facemask before emergency medical services arrive. Discontinuing home isolation  Patients with confirmed COVID-19 should remain under home isolation precautions until the risk of secondary transmission to others is thought to be low. The decision to discontinue home isolation precautions should be made on a case-by-case basis, in consultation with healthcare providers and state and local health departments.

## 2020-08-08 LAB — SARS-COV-2, NAA: NOT DETECTED

## 2020-08-24 ENCOUNTER — TELEPHONE (OUTPATIENT)
Dept: ENDOCRINOLOGY | Age: 60
End: 2020-08-24

## 2020-08-24 NOTE — TELEPHONE ENCOUNTER
Mrs. Augie Vincent stated she was told by the pharmacist not to tale Metformin and Synardy at the same time. I explained to Mrs. Augie Vincent she should not be taking the Metformin at all. I explained the Synjardy replaced the metformin. She stated she understood and would stop taking the Metformin.

## 2020-10-07 ENCOUNTER — APPOINTMENT (OUTPATIENT)
Dept: GENERAL RADIOLOGY | Age: 60
DRG: 420 | End: 2020-10-07
Payer: MEDICAID

## 2020-10-07 ENCOUNTER — HOSPITAL ENCOUNTER (INPATIENT)
Age: 60
LOS: 1 days | Discharge: HOME | DRG: 420 | End: 2020-10-08
Attending: EMERGENCY MEDICINE | Admitting: INTERNAL MEDICINE
Payer: MEDICAID

## 2020-10-07 PROBLEM — E11.10 DKA, TYPE 2, NOT AT GOAL (HCC): Status: ACTIVE | Noted: 2020-10-07

## 2020-10-07 PROBLEM — E11.10 DKA (DIABETIC KETOACIDOSES): Status: ACTIVE | Noted: 2020-10-07

## 2020-10-07 LAB
A/G RATIO: 1.1 (ref 1.1–2.2)
ALBUMIN SERPL-MCNC: 3.7 G/DL (ref 3.4–5)
ALP BLD-CCNC: 167 U/L (ref 40–129)
ALT SERPL-CCNC: 42 U/L (ref 10–40)
AMORPHOUS: ABNORMAL /HPF
ANION GAP SERPL CALCULATED.3IONS-SCNC: 16 MMOL/L (ref 3–16)
AST SERPL-CCNC: 24 U/L (ref 15–37)
BACTERIA: ABNORMAL /HPF
BASE EXCESS VENOUS: -9 MMOL/L (ref -3–3)
BASOPHILS ABSOLUTE: 0 K/UL (ref 0–0.2)
BASOPHILS RELATIVE PERCENT: 0.5 %
BILIRUB SERPL-MCNC: 0.4 MG/DL (ref 0–1)
BILIRUBIN URINE: NEGATIVE
BLOOD, URINE: ABNORMAL
BUN BLDV-MCNC: 35 MG/DL (ref 7–20)
CALCIUM SERPL-MCNC: 9 MG/DL (ref 8.3–10.6)
CARBOXYHEMOGLOBIN: 2.2 % (ref 0–1.5)
CHLORIDE BLD-SCNC: 96 MMOL/L (ref 99–110)
CLARITY: CLEAR
CO2: 15 MMOL/L (ref 21–32)
COLOR: YELLOW
CREAT SERPL-MCNC: 1.3 MG/DL (ref 0.6–1.2)
EOSINOPHILS ABSOLUTE: 0 K/UL (ref 0–0.6)
EOSINOPHILS RELATIVE PERCENT: 0.1 %
EPITHELIAL CELLS, UA: ABNORMAL /HPF (ref 0–5)
GFR AFRICAN AMERICAN: 50
GFR NON-AFRICAN AMERICAN: 42
GLOBULIN: 3.3 G/DL
GLUCOSE BLD-MCNC: 126 MG/DL (ref 70–99)
GLUCOSE BLD-MCNC: 195 MG/DL (ref 70–99)
GLUCOSE BLD-MCNC: 305 MG/DL (ref 70–99)
GLUCOSE BLD-MCNC: 419 MG/DL (ref 70–99)
GLUCOSE BLD-MCNC: 544 MG/DL (ref 70–99)
GLUCOSE BLD-MCNC: 576 MG/DL (ref 70–99)
GLUCOSE BLD-MCNC: 634 MG/DL (ref 70–99)
GLUCOSE URINE: >=1000 MG/DL
HCO3 VENOUS: 15.9 MMOL/L (ref 23–29)
HCT VFR BLD CALC: 32.3 % (ref 36–48)
HEMOGLOBIN: 10 G/DL (ref 12–16)
KETONES, URINE: ABNORMAL MG/DL
LACTIC ACID: 1.8 MMOL/L (ref 0.4–2)
LEUKOCYTE ESTERASE, URINE: NEGATIVE
LYMPHOCYTES ABSOLUTE: 1.3 K/UL (ref 1–5.1)
LYMPHOCYTES RELATIVE PERCENT: 19 %
MAGNESIUM: 1.9 MG/DL (ref 1.8–2.4)
MCH RBC QN AUTO: 27.9 PG (ref 26–34)
MCHC RBC AUTO-ENTMCNC: 30.9 G/DL (ref 31–36)
MCV RBC AUTO: 90.1 FL (ref 80–100)
METHEMOGLOBIN VENOUS: 0.1 %
MICROSCOPIC EXAMINATION: YES
MONOCYTES ABSOLUTE: 0.3 K/UL (ref 0–1.3)
MONOCYTES RELATIVE PERCENT: 4 %
NEUTROPHILS ABSOLUTE: 5.1 K/UL (ref 1.7–7.7)
NEUTROPHILS RELATIVE PERCENT: 76.4 %
NITRITE, URINE: NEGATIVE
O2 CONTENT, VEN: 15 VOL %
O2 SAT, VEN: 98 %
O2 THERAPY: ABNORMAL
PCO2, VEN: 31 MMHG (ref 40–50)
PDW BLD-RTO: 13.5 % (ref 12.4–15.4)
PERFORMED ON: ABNORMAL
PH UA: 5.5 (ref 5–8)
PH VENOUS: 7.33 (ref 7.35–7.45)
PHOSPHORUS: 3.6 MG/DL (ref 2.5–4.9)
PLATELET # BLD: 163 K/UL (ref 135–450)
PMV BLD AUTO: 10.4 FL (ref 5–10.5)
PO2, VEN: 162.1 MMHG (ref 25–40)
POTASSIUM SERPL-SCNC: 4.4 MMOL/L (ref 3.5–5.1)
PROTEIN UA: NEGATIVE MG/DL
RBC # BLD: 3.58 M/UL (ref 4–5.2)
RBC UA: ABNORMAL /HPF (ref 0–4)
SODIUM BLD-SCNC: 127 MMOL/L (ref 136–145)
SPECIFIC GRAVITY UA: 1.01 (ref 1–1.03)
TCO2 CALC VENOUS: 17 MMOL/L
TOTAL PROTEIN: 7 G/DL (ref 6.4–8.2)
TROPONIN: <0.01 NG/ML
URINE REFLEX TO CULTURE: ABNORMAL
URINE TYPE: ABNORMAL
UROBILINOGEN, URINE: 0.2 E.U./DL
WBC # BLD: 6.7 K/UL (ref 4–11)
WBC UA: ABNORMAL /HPF (ref 0–5)
YEAST: PRESENT /HPF

## 2020-10-07 PROCEDURE — 71045 X-RAY EXAM CHEST 1 VIEW: CPT

## 2020-10-07 PROCEDURE — 6370000000 HC RX 637 (ALT 250 FOR IP): Performed by: INTERNAL MEDICINE

## 2020-10-07 PROCEDURE — 83735 ASSAY OF MAGNESIUM: CPT

## 2020-10-07 PROCEDURE — 93005 ELECTROCARDIOGRAM TRACING: CPT | Performed by: PHYSICIAN ASSISTANT

## 2020-10-07 PROCEDURE — 85025 COMPLETE CBC W/AUTO DIFF WBC: CPT

## 2020-10-07 PROCEDURE — 81001 URINALYSIS AUTO W/SCOPE: CPT

## 2020-10-07 PROCEDURE — 96365 THER/PROPH/DIAG IV INF INIT: CPT

## 2020-10-07 PROCEDURE — 96366 THER/PROPH/DIAG IV INF ADDON: CPT

## 2020-10-07 PROCEDURE — 90715 TDAP VACCINE 7 YRS/> IM: CPT | Performed by: PHYSICIAN ASSISTANT

## 2020-10-07 PROCEDURE — 99291 CRITICAL CARE FIRST HOUR: CPT

## 2020-10-07 PROCEDURE — 90471 IMMUNIZATION ADMIN: CPT | Performed by: PHYSICIAN ASSISTANT

## 2020-10-07 PROCEDURE — 2580000003 HC RX 258: Performed by: INTERNAL MEDICINE

## 2020-10-07 PROCEDURE — 96361 HYDRATE IV INFUSION ADD-ON: CPT

## 2020-10-07 PROCEDURE — 73590 X-RAY EXAM OF LOWER LEG: CPT

## 2020-10-07 PROCEDURE — 82803 BLOOD GASES ANY COMBINATION: CPT

## 2020-10-07 PROCEDURE — 84484 ASSAY OF TROPONIN QUANT: CPT

## 2020-10-07 PROCEDURE — 83605 ASSAY OF LACTIC ACID: CPT

## 2020-10-07 PROCEDURE — 6360000002 HC RX W HCPCS: Performed by: PHYSICIAN ASSISTANT

## 2020-10-07 PROCEDURE — 84100 ASSAY OF PHOSPHORUS: CPT

## 2020-10-07 PROCEDURE — G0378 HOSPITAL OBSERVATION PER HR: HCPCS

## 2020-10-07 PROCEDURE — 2580000003 HC RX 258: Performed by: PHYSICIAN ASSISTANT

## 2020-10-07 PROCEDURE — 6370000000 HC RX 637 (ALT 250 FOR IP): Performed by: PHYSICIAN ASSISTANT

## 2020-10-07 PROCEDURE — 80053 COMPREHEN METABOLIC PANEL: CPT

## 2020-10-07 PROCEDURE — 1200000000 HC SEMI PRIVATE

## 2020-10-07 RX ORDER — PANTOPRAZOLE SODIUM 40 MG/1
40 TABLET, DELAYED RELEASE ORAL
Status: DISCONTINUED | OUTPATIENT
Start: 2020-10-08 | End: 2020-10-08 | Stop reason: HOSPADM

## 2020-10-07 RX ORDER — ASPIRIN 81 MG/1
81 TABLET, CHEWABLE ORAL DAILY
Status: DISCONTINUED | OUTPATIENT
Start: 2020-10-08 | End: 2020-10-08 | Stop reason: HOSPADM

## 2020-10-07 RX ORDER — NICOTINE POLACRILEX 4 MG
15 LOZENGE BUCCAL PRN
Status: DISCONTINUED | OUTPATIENT
Start: 2020-10-07 | End: 2020-10-08 | Stop reason: HOSPADM

## 2020-10-07 RX ORDER — DEXTROSE MONOHYDRATE 25 G/50ML
12.5 INJECTION, SOLUTION INTRAVENOUS PRN
Status: DISCONTINUED | OUTPATIENT
Start: 2020-10-07 | End: 2020-10-08 | Stop reason: HOSPADM

## 2020-10-07 RX ORDER — FLUCONAZOLE 100 MG/1
200 TABLET ORAL ONCE
Status: COMPLETED | OUTPATIENT
Start: 2020-10-07 | End: 2020-10-07

## 2020-10-07 RX ORDER — TRAZODONE HYDROCHLORIDE 50 MG/1
100 TABLET ORAL NIGHTLY
Status: DISCONTINUED | OUTPATIENT
Start: 2020-10-07 | End: 2020-10-08 | Stop reason: HOSPADM

## 2020-10-07 RX ORDER — MAGNESIUM SULFATE 1 G/100ML
1 INJECTION INTRAVENOUS PRN
Status: DISCONTINUED | OUTPATIENT
Start: 2020-10-07 | End: 2020-10-08 | Stop reason: HOSPADM

## 2020-10-07 RX ORDER — LISINOPRIL 10 MG/1
10 TABLET ORAL DAILY
Status: DISCONTINUED | OUTPATIENT
Start: 2020-10-08 | End: 2020-10-08 | Stop reason: HOSPADM

## 2020-10-07 RX ORDER — SODIUM CHLORIDE 9 MG/ML
INJECTION, SOLUTION INTRAVENOUS CONTINUOUS
Status: DISCONTINUED | OUTPATIENT
Start: 2020-10-07 | End: 2020-10-07

## 2020-10-07 RX ORDER — DEXTROSE AND SODIUM CHLORIDE 5; .45 G/100ML; G/100ML
INJECTION, SOLUTION INTRAVENOUS CONTINUOUS PRN
Status: DISCONTINUED | OUTPATIENT
Start: 2020-10-07 | End: 2020-10-08

## 2020-10-07 RX ORDER — 0.9 % SODIUM CHLORIDE 0.9 %
1000 INTRAVENOUS SOLUTION INTRAVENOUS ONCE
Status: DISCONTINUED | OUTPATIENT
Start: 2020-10-07 | End: 2020-10-08 | Stop reason: HOSPADM

## 2020-10-07 RX ORDER — POTASSIUM CHLORIDE 7.45 MG/ML
10 INJECTION INTRAVENOUS PRN
Status: DISCONTINUED | OUTPATIENT
Start: 2020-10-07 | End: 2020-10-08 | Stop reason: HOSPADM

## 2020-10-07 RX ORDER — GABAPENTIN 300 MG/1
600 CAPSULE ORAL 3 TIMES DAILY
Status: DISCONTINUED | OUTPATIENT
Start: 2020-10-07 | End: 2020-10-08 | Stop reason: HOSPADM

## 2020-10-07 RX ORDER — DEXTROSE MONOHYDRATE 50 MG/ML
100 INJECTION, SOLUTION INTRAVENOUS PRN
Status: DISCONTINUED | OUTPATIENT
Start: 2020-10-07 | End: 2020-10-08 | Stop reason: HOSPADM

## 2020-10-07 RX ORDER — 0.9 % SODIUM CHLORIDE 0.9 %
1000 INTRAVENOUS SOLUTION INTRAVENOUS ONCE
Status: COMPLETED | OUTPATIENT
Start: 2020-10-07 | End: 2020-10-07

## 2020-10-07 RX ORDER — ATORVASTATIN CALCIUM 10 MG/1
10 TABLET, FILM COATED ORAL NIGHTLY
Status: DISCONTINUED | OUTPATIENT
Start: 2020-10-07 | End: 2020-10-08 | Stop reason: HOSPADM

## 2020-10-07 RX ORDER — OXYCODONE HYDROCHLORIDE AND ACETAMINOPHEN 5; 325 MG/1; MG/1
1 TABLET ORAL EVERY 6 HOURS PRN
Status: DISCONTINUED | OUTPATIENT
Start: 2020-10-07 | End: 2020-10-08 | Stop reason: HOSPADM

## 2020-10-07 RX ORDER — LETROZOLE 2.5 MG/1
2.5 TABLET, FILM COATED ORAL DAILY
Status: DISCONTINUED | OUTPATIENT
Start: 2020-10-08 | End: 2020-10-08 | Stop reason: HOSPADM

## 2020-10-07 RX ADMIN — SODIUM CHLORIDE: 9 INJECTION, SOLUTION INTRAVENOUS at 21:02

## 2020-10-07 RX ADMIN — TRAZODONE HYDROCHLORIDE 100 MG: 50 TABLET ORAL at 21:05

## 2020-10-07 RX ADMIN — TETANUS TOXOID, REDUCED DIPHTHERIA TOXOID AND ACELLULAR PERTUSSIS VACCINE, ADSORBED 0.5 ML: 5; 2.5; 8; 8; 2.5 SUSPENSION INTRAMUSCULAR at 19:01

## 2020-10-07 RX ADMIN — SODIUM CHLORIDE 1000 ML: 9 INJECTION, SOLUTION INTRAVENOUS at 18:57

## 2020-10-07 RX ADMIN — SODIUM CHLORIDE 10.77 UNITS/HR: 9 INJECTION, SOLUTION INTRAVENOUS at 20:47

## 2020-10-07 RX ADMIN — GABAPENTIN 600 MG: 300 CAPSULE ORAL at 21:05

## 2020-10-07 RX ADMIN — SODIUM CHLORIDE 5.4 UNITS/HR: 9 INJECTION, SOLUTION INTRAVENOUS at 21:45

## 2020-10-07 RX ADMIN — TICAGRELOR 90 MG: 90 TABLET ORAL at 21:05

## 2020-10-07 RX ADMIN — FLUCONAZOLE 200 MG: 100 TABLET ORAL at 21:05

## 2020-10-07 RX ADMIN — DEXTROSE AND SODIUM CHLORIDE: 5; 450 INJECTION, SOLUTION INTRAVENOUS at 22:45

## 2020-10-07 RX ADMIN — ATORVASTATIN CALCIUM 10 MG: 10 TABLET, FILM COATED ORAL at 21:05

## 2020-10-07 NOTE — ED NOTES
Fall precautions in place. Bed alarm, fall socks, fall wristband in place.      Sravanthi Alcantar RN  10/07/20 [id number]

## 2020-10-07 NOTE — ED PROVIDER NOTES
[address] [facility] Emergency Department    CHIEF COMPLAINT  Hyperglycemia (Pt states her BS is in the 500s and she is staggering when she walks.)      [address]  I have seen and evaluated this patient with my supervising physician, Dr. Anju Hurtado. HISTORY OF PRESENT ILLNESS  Hugo Matos is a 63 y.o. female who presents to the ED complaining of Elevated blood sugars. Patient reports history of diabetes. Does take insulin once daily. Over past several days blood sugars have been running high. She reports that she has had some mild \"fogginess\". No significant headaches. Denies visual changes disturbances. No difficulty speaking swallowing. No complaints of neck or back pain. She denies chest pain or shortness of breath. Has had some mild nausea with dry heaving. Also reports some diarrhea. No associated abdominal pain. Denies fevers chills. No urinary symptoms. Reports that she did stumble and fall 2 days ago injuring her right shin. She has been ambulatory on it. Does not use blood thinners. Unaware of last tetanus update. No other complaints, modifying factors or associated symptoms. Nursing notes reviewed. Past Medical History:   Diagnosis Date    Abnormal brain MRI 7/20/2017    Partially empty sella and minimal chronic small vessel ischemic disease    Acute bilateral low back pain without sciatica 11/2/2016    SHARRON (acute kidney injury) ([address] 7/5/2017    Arthritis     back    Bipolar disorder ([address] 10/18/2008    CAD (coronary artery disease)     stent placed 6/8/20    Cancer [facility]) 2015    bilateral breast:s/p lumpectomy/radiation:under care care of breast specialist:Dr. Boone     Carotid stenosis, bilateral:<50%:per [place] 7/2016 7/15/2016    Carpal tunnel syndrome 10/18/2008    Cervical cancer screening 2014    Nml per pt'.     Coronary artery disease of native artery of native heart with stable angina pectoris ([address] 6/9/2020    DDD (degenerative disc disease), lumbar 7/18/2018    Depression     under care of pschiatrist:Dr. Norma Abarca    Depression/anxiety 7/5/2017    Depression/anxiety     Diabetes mellitus 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 9/17/2017    Microalbuminuria 7/1/2016    Neuropathy in diabetes ([facility]     Non morbid obesity 7/1/2016    Pancreatitis 5/12/16    [facility] hospitalization 5/12/16-5/16/16:under care of GI:chronic pancreatitis    S/P endoscopy 6/14/2016    [place]:per pt' & her family member was nml.     Scoliosis     Spondylosis of lumbar region without myelopathy or radiculopathy 3/10/2017    Transient cerebral ischemia 07/15/2016    TIA:7/10/16    Unspecified cerebral artery occlusion with cerebral infarction     TIA     Past Surgical History:   Procedure Laterality Date    BREAST LUMPECTOMY  2015    Bilateral:breast cancer    CARDIAC CATHETERIZATION  06/08/2020    Dr. Alhaji Gusman), DAYANA to Diag 1    HYSTERECTOMY      Benign:no cervical cancer per pt'    KIDNEY REMOVAL      right    OTHER SURGICAL HISTORY Right     orif right ankle    TUBAL LIGATION       Family History   Problem Relation Age of Onset    Cancer Mother         breast    Cancer Father     Heart Failure Neg Hx     High Cholesterol Neg Hx     Hypertension Neg Hx     Migraines Neg Hx     Rashes/Skin Problems Neg Hx     Seizures Neg Hx     Stroke Neg Hx     Thyroid Disease Neg Hx     Diabetes Neg Hx      Social History     Socioeconomic History    Marital status:      Spouse name: Not on file    Number of children: Not on file    Years of education: Not on file    Highest education level: Not on file   Occupational History    Occupation:    Social Needs    Financial resource strain: Not on file    Food insecurity Worry: Not on file     Inability: Not on file    Transportation needs     Medical: Not on file     Non-medical: Not on file   Tobacco Use    Smoking status: Former Smoker     Packs/day: 0.50     Years: 20.00     Pack years: 10.00     Types: Cigarettes, Cigars     Last attempt to quit: 7/3/2014     Years since quittin.2    Smokeless tobacco: Never Used   Substance and Sexual Activity    Alcohol use: No     Alcohol/week: 0.0 standard drinks    Drug use: No    Sexual activity: Never   Lifestyle    Physical activity     Days per week: Not on file     Minutes per session: Not on file    Stress: Not on file   Relationships    Social connections     Talks on phone: Not on file     Gets together: Not on file     Attends [employer] service: Not on file     Active member of club or organization: Not on file     Attends meetings of clubs or organizations: Not on file     Relationship status: Not on file    Intimate partner violence     Fear of current or ex partner: Not on file     Emotionally abused: Not on file     Physically abused: Not on file     Forced sexual activity: Not on file   Other Topics Concern    Not on file   Social History Narrative    Not on file     Current Facility-Administered Medications   Medication Dose Route Frequency Provider Last Rate Last Dose    0.9 % sodium chloride bolus  1,000 mL Intravenous Once Vera Greulich, [place]        0.9 % sodium chloride bolus  1,000 mL Intravenous [facility]        Tetanus-Diphth-Acell Pertussis (BOOSTRIX) injection 0.5 mL  0.5 mL Intramuscular Once Vera Greulich, [place]         Current Outpatient Medications   Medication Sig Dispense Refill    Liraglutide (VICTOZA) 18 MG/3ML SOPN SC injection Inject 1.8 mg into the skin daily 3 pen 5    blood glucose test strips (TRUE METRIX BLOOD GLUCOSE TEST) strip USE AS DIRECTED TO TEST 4 TIMES A  strip 5    pantoprazole (PROTONIX) 40 MG tablet Take 1 tablet by mouth every morning (before breakfast) 30 tablet 3    nitroGLYCERIN (NITROSTAT) 0.4 MG SL tablet up to max of 3 total doses. If no relief after 1 dose, call 911. 25 tablet 3    Insulin Pen Needle (UNIFINE PENTIPS) 32G X 4 MM MISC USE AS DIRECTED 3 TIMES A  each 6    simvastatin (ZOCOR) 20 MG tablet Take 20 mg by mouth nightly      cetirizine (ZYRTEC) 10 MG tablet Take 10 mg by mouth daily      lisinopril (PRINIVIL;ZESTRIL) 10 MG tablet Take 10 mg by mouth daily      Empagliflozin-metFORMIN HCl 12.5-1000 MG TABS Take 2 tablets by mouth daily 60 tablet 5    Blood Pressure Monitor GINA Check BP at home once or twice a day 1 Device 0    insulin glargine (LANTUS SOLOSTAR) 100 UNIT/ML injection pen Inject 22 Units into the skin every morning (Patient taking differently: Inject 25 Units into the skin every morning ) 5 pen 3    Insulin Syringe-Needle U-100 31G X 5/16\" 0.3 ML MISC USE 3 TIMES A DAY BEFORE MEALS 90 each 7    calcium carbonate-vitamin D (CALTRATE) 600-400 MG-UNIT TABS per tab       glucagon 1 MG injection Inject 1 mg into the muscle See Admin Instructions Follow package directions for low blood sugar. 1 kit 0    clonazePAM (KLONOPIN) 1 MG tablet Take 1 mg by mouth as needed. Maria Luz Gourd paliperidone (INVEGA) 9 MG extended release tablet Take 9 mg by mouth every morning       aspirin 81 MG tablet Take 81 mg by mouth daily      gabapentin (NEURONTIN) 600 MG tablet Take 600 mg by mouth 3 times daily      oxyCODONE-acetaminophen (PERCOCET) 5-325 MG per tablet Take 1 tablet by mouth every 6 hours as needed for Pain.  Maria Luz Gourd traZODone (DESYREL) 100 MG tablet Take 100 mg by mouth nightly      ticagrelor (BRILINTA) 90 MG TABS tablet Take 1 tablet by mouth 2 times daily 60 tablet 11    letrozole (FEMARA) 2.5 MG tablet TAKE 1 TABLET BY MOUTH EVERY DAY  3     Allergies   Allergen Reactions    Morphine Anaphylaxis and Hives     feels like throat is closing    Penicillins Hives and Swelling    Codeine Hives and Rash    Reason for Exam: Fall Acuity: Acute Type of Exam: Initial Acute left hip pain. FINDINGS: Frontal and lateral views of the proximal and distal aspects of the left tibia and fibula were performed. There is no acute fracture or dislocation. There is a chronic healed fracture deformity of the proximal left fibular neck with a small osseous lucency noted. Periosteal reaction or osseous destruction is evident. The left knee and ankle joints appear preserved. No soft tissue abnormality or radiopaque foreign body is evident. 1. No acute abnormality of the left tibia or fibula. 2. Chronic healed fracture deformity of the proximal left fibular neck. Xr Chest Portable    Result Date: 10/7/2020  EXAMINATION: ONE XRAY VIEW OF THE CHEST 10/7/2020 5:13 pm COMPARISON: July 11, 2020 HISTORY: ORDERING SYSTEM PROVIDED HISTORY: fall TECHNOLOGIST PROVIDED HISTORY: Reason for exam:->fall Reason for Exam: fall Acuity: Acute Type of Exam: Initial FINDINGS: The lungs are without acute focal process. There is no effusion or pneumothorax. The cardiomediastinal silhouette is stable. The osseous structures are stable. No acute process. Stable compared to the prior study     ED COURSE  Pain control was not required while here in the emergency department. Triage vitals stable. Point-of-care glucose 576. Given 1 L IV fluid bolus. CBC without leukocytosis or anemia. Tip/fib plain films without evidence for fractures or dislocations. Tetanus booster updated. CBC without leukocytosis or anemia. CMP with sodium and chloride at 127 and 96. Blood sugar 634. No anion gap. BUN and creatinine appear baseline. VBG with a pH of 7.32. Bicarb was approximately 16. Normal mag and Phos. Troponin  less than 0.01. Urinalysis without evidence for overt infection although yeast was present. She was given dose of Diflucan. Insulin drip started for DKA. Discussed care with hospital medicine and admission orders placed.   A discussion was had with Ms. Fried regarding nausea and vomiting, elevated blood sugars, ED findings and recommendations for admission. Risk management discussed and shared decision making had with patient and/or surrogate. All questions were answered. Patient in agreement. CRITICAL CARE TIME  60 Minutes of critical care time spent not including separately billable procedures.     MDM  Results for orders placed or performed during the hospital encounter of 10/07/20   CBC Auto Differential   Result Value Ref Range    WBC 6.7 4.0 - 11.0 K/uL    RBC 3.58 (L) 4.00 - 5.20 M/uL    Hemoglobin 10.0 (L) 12.0 - 16.0 g/dL    Hematocrit 32.3 (L) 36.0 - 48.0 %    MCV 90.1 80.0 - 100.0 fL    MCH 27.9 26.0 - 34.0 pg    MCHC 30.9 (L) 31.0 - 36.0 g/dL    RDW 13.5 12.4 - 15.4 %    Platelets 543 729 - 908 K/uL    MPV 10.4 5.0 - 10.5 fL    Neutrophils % 76.4 %    Lymphocytes % 19.0 %    Monocytes % 4.0 %    Eosinophils % 0.1 %    Basophils % 0.5 %    Neutrophils Absolute 5.1 1.7 - 7.7 K/uL    Lymphocytes Absolute 1.3 1.0 - 5.1 K/uL    Monocytes Absolute 0.3 0.0 - 1.3 K/uL    Eosinophils Absolute 0.0 0.0 - 0.6 K/uL    Basophils Absolute 0.0 0.0 - 0.2 K/uL   Comprehensive metabolic panel   Result Value Ref Range    Sodium 127 (L) 136 - 145 mmol/L    Potassium 4.4 3.5 - 5.1 mmol/L    Chloride 96 (L) 99 - 110 mmol/L    CO2 15 (L) 21 - 32 mmol/L    Anion Gap 16 3 - 16    Glucose 634 (HH) 70 - 99 mg/dL    BUN 35 (H) 7 - 20 mg/dL    CREATININE 1.3 (H) 0.6 - 1.2 mg/dL    GFR Non-African American 42 (A) >60    GFR  50 (A) >60    Calcium 9.0 8.3 - 10.6 mg/dL    Total Protein 7.0 6.4 - 8.2 g/dL    Alb 3.7 3.4 - 5.0 g/dL    Albumin/Globulin Ratio 1.1 1.1 - 2.2    Total Bilirubin 0.4 0.0 - 1.0 mg/dL    Alkaline Phosphatase 167 (H) 40 - 129 U/L    ALT 42 (H) 10 - 40 U/L    AST 24 15 - 37 U/L    Globulin 3.3 g/dL   Blood gas, venous   Result Value Ref Range    pH, Kyle 7.328 (L) 7.350 - 7.450    pCO2, Kyle 31.0 (L) 40.0 - 50.0 mmHg    pO2, Kyle 162.1 (H) 25.0 - 40.0 mmHg    HCO3, Venous 15.9 (L) 23.0 - 29.0 mmol/L    Base Excess, Kyle -9.0 (L) -3.0 - 3.0 mmol/L    O2 Sat, Kyle 98 Not Established %    Carboxyhemoglobin 2.2 (H) 0.0 - 1.5 %    MetHgb, Kyle 0.1 <1.5 %    TC02 (Calc), Kyle 17 Not Established mmol/L    O2 Content, Kyle 15 Not Established VOL %    O2 Therapy Unknown    Phosphorus   Result Value Ref Range    Phosphorus 3.6 2.5 - 4.9 mg/dL   Lactic acid, plasma   Result Value Ref Range    Lactic Acid 1.8 0.4 - 2.0 mmol/L   Urinalysis Reflex to Culture    Specimen: Urine, clean catch   Result Value Ref Range    Color, UA Yellow Straw/Yellow    Clarity, UA Clear Clear    Glucose, Ur >=1000 (A) Negative mg/dL    Bilirubin Urine Negative Negative    Ketones, Urine TRACE (A) Negative mg/dL    Specific Gravity, UA 1.010 1.005 - 1.030    Blood, Urine SMALL (A) Negative    pH, UA 5.5 5.0 - 8.0    Protein, UA Negative Negative mg/dL    Urobilinogen, Urine 0.2 <2.0 E.U./dL    Nitrite, Urine Negative Negative    Leukocyte Esterase, Urine Negative Negative    Microscopic Examination YES     Urine Type NotGiven     Urine Reflex to Culture Not Indicated    Microscopic Urinalysis   Result Value Ref Range    WBC, UA 0-2 0 - 5 /HPF    RBC, UA 5-10 (A) 0 - 4 /HPF    Epithelial Cells, UA 0-1 0 - 5 /HPF    Bacteria, UA Rare (A) None Seen /HPF    Amorphous, UA Rare /HPF    Yeast, UA Present (A) None Seen /HPF   Magnesium   Result Value Ref Range    Magnesium 1.90 1.80 - 2.40 mg/dL   Troponin   Result Value Ref Range    Troponin <0.01 <0.01 ng/mL   POCT Glucose   Result Value Ref Range    POC Glucose 576 (H) 70 - 99 mg/dl    Performed on ACCU-CHEK    POCT Glucose   Result Value Ref Range    POC Glucose 544 (H) 70 - 99 mg/dl    Performed on ACCU-itBitK    EKG 12 Lead   Result Value Ref Range    Ventricular Rate 62 BPM    Atrial Rate 62 BPM    P-R Interval 144 ms    QRS Duration 82 ms    Q-T Interval 408 ms    QTc Calculation (Bazett) 414 ms    P Axis 55 degrees    R Axis -32 degrees    T Axis 44 degrees    Diagnosis       Normal sinus rhythm with sinus arrhythmiaLeft axis deviationAbnormal ECGWhen compared with ECG of 11-JUL-2020 07:49,No significant change was found     I spoke with Lennie Ramirez and Owen. We thoroughly discussed the history, physical exam, laboratory and imaging studies, as well as, emergency department course. Based upon that discussion, we've decided to admit Michelle Coburn to the hospital for further observation, evaluation, and treatment. Final Impression  1. Diabetic ketoacidosis without coma associated with type 2 diabetes mellitus ([facility]    2. Abrasion of left lower leg, initial encounter    3. Yeast UTI      Blood pressure (!) 129/54, pulse 62, temperature 99.1 °F (37.3 °C), temperature source Oral, resp. rate 18, height 5' 3\" (1.6 m), weight 143 lb (64.9 kg), SpO2 99 %, not currently breastfeeding. DISPOSITION  Patient was admitted to the hospital in stable condition.           Jorge Frias  10/07/20 2028

## 2020-10-08 VITALS
HEART RATE: 59 BPM | DIASTOLIC BLOOD PRESSURE: 62 MMHG | TEMPERATURE: 98.3 F | BODY MASS INDEX: 25.34 KG/M2 | RESPIRATION RATE: 16 BRPM | WEIGHT: 143 LBS | HEIGHT: 63 IN | OXYGEN SATURATION: 100 % | SYSTOLIC BLOOD PRESSURE: 126 MMHG

## 2020-10-08 LAB
ANION GAP SERPL CALCULATED.3IONS-SCNC: 11 MMOL/L (ref 3–16)
ANION GAP SERPL CALCULATED.3IONS-SCNC: 12 MMOL/L (ref 3–16)
ANION GAP SERPL CALCULATED.3IONS-SCNC: 9 MMOL/L (ref 3–16)
BUN BLDV-MCNC: 25 MG/DL (ref 7–20)
BUN BLDV-MCNC: 28 MG/DL (ref 7–20)
BUN BLDV-MCNC: 28 MG/DL (ref 7–20)
CALCIUM SERPL-MCNC: 8.6 MG/DL (ref 8.3–10.6)
CALCIUM SERPL-MCNC: 8.7 MG/DL (ref 8.3–10.6)
CALCIUM SERPL-MCNC: 9 MG/DL (ref 8.3–10.6)
CHLORIDE BLD-SCNC: 104 MMOL/L (ref 99–110)
CHLORIDE BLD-SCNC: 107 MMOL/L (ref 99–110)
CHLORIDE BLD-SCNC: 109 MMOL/L (ref 99–110)
CO2: 20 MMOL/L (ref 21–32)
CO2: 21 MMOL/L (ref 21–32)
CO2: 21 MMOL/L (ref 21–32)
CREAT SERPL-MCNC: 1.2 MG/DL (ref 0.6–1.2)
CREAT SERPL-MCNC: 1.2 MG/DL (ref 0.6–1.2)
CREAT SERPL-MCNC: 1.4 MG/DL (ref 0.6–1.2)
EKG ATRIAL RATE: 62 BPM
EKG DIAGNOSIS: NORMAL
EKG P AXIS: 55 DEGREES
EKG P-R INTERVAL: 144 MS
EKG Q-T INTERVAL: 408 MS
EKG QRS DURATION: 82 MS
EKG QTC CALCULATION (BAZETT): 414 MS
EKG R AXIS: -32 DEGREES
EKG T AXIS: 44 DEGREES
EKG VENTRICULAR RATE: 62 BPM
GFR AFRICAN AMERICAN: 46
GFR AFRICAN AMERICAN: 55
GFR AFRICAN AMERICAN: 55
GFR NON-AFRICAN AMERICAN: 38
GFR NON-AFRICAN AMERICAN: 46
GFR NON-AFRICAN AMERICAN: 46
GLUCOSE BLD-MCNC: 127 MG/DL (ref 70–99)
GLUCOSE BLD-MCNC: 136 MG/DL (ref 70–99)
GLUCOSE BLD-MCNC: 138 MG/DL (ref 70–99)
GLUCOSE BLD-MCNC: 165 MG/DL (ref 70–99)
GLUCOSE BLD-MCNC: 172 MG/DL (ref 70–99)
GLUCOSE BLD-MCNC: 180 MG/DL (ref 70–99)
GLUCOSE BLD-MCNC: 186 MG/DL (ref 70–99)
GLUCOSE BLD-MCNC: 186 MG/DL (ref 70–99)
GLUCOSE BLD-MCNC: 191 MG/DL (ref 70–99)
GLUCOSE BLD-MCNC: 209 MG/DL (ref 70–99)
GLUCOSE BLD-MCNC: 213 MG/DL (ref 70–99)
MAGNESIUM: 1.9 MG/DL (ref 1.8–2.4)
PERFORMED ON: ABNORMAL
PHOSPHORUS: 2.6 MG/DL (ref 2.5–4.9)
PHOSPHORUS: 3.1 MG/DL (ref 2.5–4.9)
PHOSPHORUS: 3.2 MG/DL (ref 2.5–4.9)
POTASSIUM SERPL-SCNC: 3 MMOL/L (ref 3.5–5.1)
POTASSIUM SERPL-SCNC: 3.3 MMOL/L (ref 3.5–5.1)
POTASSIUM SERPL-SCNC: 4.1 MMOL/L (ref 3.5–5.1)
SODIUM BLD-SCNC: 137 MMOL/L (ref 136–145)
SODIUM BLD-SCNC: 137 MMOL/L (ref 136–145)
SODIUM BLD-SCNC: 140 MMOL/L (ref 136–145)

## 2020-10-08 PROCEDURE — 2580000003 HC RX 258: Performed by: INTERNAL MEDICINE

## 2020-10-08 PROCEDURE — 6360000002 HC RX W HCPCS: Performed by: INTERNAL MEDICINE

## 2020-10-08 PROCEDURE — 84100 ASSAY OF PHOSPHORUS: CPT

## 2020-10-08 PROCEDURE — 93010 ELECTROCARDIOGRAM REPORT: CPT | Performed by: INTERNAL MEDICINE

## 2020-10-08 PROCEDURE — 6370000000 HC RX 637 (ALT 250 FOR IP): Performed by: INTERNAL MEDICINE

## 2020-10-08 PROCEDURE — 36415 COLL VENOUS BLD VENIPUNCTURE: CPT

## 2020-10-08 PROCEDURE — 96361 HYDRATE IV INFUSION ADD-ON: CPT

## 2020-10-08 PROCEDURE — G0378 HOSPITAL OBSERVATION PER HR: HCPCS

## 2020-10-08 PROCEDURE — 96374 THER/PROPH/DIAG INJ IV PUSH: CPT

## 2020-10-08 PROCEDURE — 96366 THER/PROPH/DIAG IV INF ADDON: CPT

## 2020-10-08 PROCEDURE — 96376 TX/PRO/DX INJ SAME DRUG ADON: CPT

## 2020-10-08 PROCEDURE — 83735 ASSAY OF MAGNESIUM: CPT

## 2020-10-08 PROCEDURE — 96372 THER/PROPH/DIAG INJ SC/IM: CPT

## 2020-10-08 PROCEDURE — 96375 TX/PRO/DX INJ NEW DRUG ADDON: CPT

## 2020-10-08 PROCEDURE — 83036 HEMOGLOBIN GLYCOSYLATED A1C: CPT

## 2020-10-08 PROCEDURE — 80048 BASIC METABOLIC PNL TOTAL CA: CPT

## 2020-10-08 RX ORDER — INSULIN GLARGINE 100 [IU]/ML
25 INJECTION, SOLUTION SUBCUTANEOUS EVERY MORNING
Qty: 1 PEN | Refills: 1 | Status: ON HOLD | OUTPATIENT
Start: 2020-10-08 | End: 2021-08-11 | Stop reason: SDUPTHER

## 2020-10-08 RX ORDER — INSULIN GLARGINE 100 [IU]/ML
25 INJECTION, SOLUTION SUBCUTANEOUS EVERY MORNING
Status: DISCONTINUED | OUTPATIENT
Start: 2020-10-08 | End: 2020-10-08 | Stop reason: HOSPADM

## 2020-10-08 RX ADMIN — POTASSIUM CHLORIDE 10 MEQ: 10 INJECTION, SOLUTION INTRAVENOUS at 06:06

## 2020-10-08 RX ADMIN — LETROZOLE 2.5 MG: 2.5 TABLET ORAL at 08:10

## 2020-10-08 RX ADMIN — MUPIROCIN: 20 OINTMENT TOPICAL at 09:01

## 2020-10-08 RX ADMIN — INSULIN GLARGINE 25 UNITS: 100 INJECTION, SOLUTION SUBCUTANEOUS at 09:49

## 2020-10-08 RX ADMIN — ASPIRIN 81 MG: 81 TABLET, CHEWABLE ORAL at 08:09

## 2020-10-08 RX ADMIN — INSULIN LISPRO 5 UNITS: 100 INJECTION, SOLUTION INTRAVENOUS; SUBCUTANEOUS at 12:06

## 2020-10-08 RX ADMIN — POTASSIUM CHLORIDE 10 MEQ: 10 INJECTION, SOLUTION INTRAVENOUS at 10:51

## 2020-10-08 RX ADMIN — GABAPENTIN 600 MG: 300 CAPSULE ORAL at 08:09

## 2020-10-08 RX ADMIN — TICAGRELOR 90 MG: 90 TABLET ORAL at 08:09

## 2020-10-08 RX ADMIN — DEXTROSE AND SODIUM CHLORIDE: 5; 450 INJECTION, SOLUTION INTRAVENOUS at 05:20

## 2020-10-08 RX ADMIN — PANTOPRAZOLE SODIUM 40 MG: 40 TABLET, DELAYED RELEASE ORAL at 07:08

## 2020-10-08 RX ADMIN — POTASSIUM CHLORIDE 10 MEQ: 10 INJECTION, SOLUTION INTRAVENOUS at 07:58

## 2020-10-08 RX ADMIN — ENOXAPARIN SODIUM 40 MG: 40 INJECTION SUBCUTANEOUS at 08:09

## 2020-10-08 RX ADMIN — POTASSIUM CHLORIDE 10 MEQ: 10 INJECTION, SOLUTION INTRAVENOUS at 09:01

## 2020-10-08 RX ADMIN — POTASSIUM CHLORIDE 10 MEQ: 10 INJECTION, SOLUTION INTRAVENOUS at 07:07

## 2020-10-08 NOTE — PROGRESS NOTES
Patient assessment complete and charted. VSS. BP trending low. AOx4. Insulin gtt infusing. Bed locked and in lowest position. Non-skid socks in place. Call light within reach. Patient states no further needs at this time. Will continue to monitor.

## 2020-10-08 NOTE — PROGRESS NOTES
Nutrition Education    · Verbally reviewed information with Patient  · Educated on DM diet education  · Written educational materials provided. · Contact name and number provided. · Refer to Patient Education activity for more details. Consult received for DM diet education. Discussed sources of carb, carb counting, meal planning and label reading. Pt reports that she \"loves carbs\", but is ready to change to stay out of the hospital. Discussed portion sizes. Pt voiced understanding and had no questions at this time.     Electronically signed by Rajni Archuleta RD, LD on 10/8/20 at 2:16 PM EDT    Contact: [phone]

## 2020-10-08 NOTE — ED PROVIDER NOTES
I independently performed a history and physical on Rosa Franklin. All diagnostic, treatment, and disposition decisions were made by myself in conjunction with the advanced practice provider. I have participated in the medical decision making and directed the treatment plan and disposition of the patient. For further details of [facility] emergency department encounter, please see the advanced practice provider's documentation. CHIEF COMPLAINT  Chief Complaint   Patient presents with    Hyperglycemia     Pt states her BS is in the 500s and she is staggering when she walks. Briefly, Rosa Franklin is a 63 y.o. female  who presents to the ED complaining of hyperglycemia, polyuria and polydipsia. Admits to being somewhat confused with her insulin dosing states she did not take insulin this morning. Denies abdominal pain, vomiting, diarrhea, chest pain, cough or shortness of breath    FOCUSED PHYSICAL EXAMINATION  BP (!) 130/56   Pulse 58   Temp 98.4 °F (36.9 °C) (Oral)   Resp 17   Ht 5' 3\" (1.6 m)   Wt 143 lb (64.9 kg)   SpO2 99%   BMI 25.33 kg/m²      Focused physical examination:  General appearance:  Cooperative. No acute distress. Skin:  Warm. Dry. Eye:  Extraocular movements intact. Ears, nose, mouth and throat:  Oral mucosa moist,  Neck:  Trachea midline. Heart:  Regular rate and rhythm  Perfusion:  intact  Respiratory:  Lungs clear to auscultation bilaterally. Respirations nonlabored. Abdominal:   Non distended. Nontender  Neurological:  Alert and oriented x 3. Moves all extremities spontaneously  Musculoskeletal:   Normal ROM, no deformities          Psychiatric:  Normal mood      EKG: Sinus rhythm with sinus arrhythmia rate of 62 bpm with left axis deviation. No ST elevation. Similar to prior from 7/11/2020    MDM: Patient presents to the emergency department today hyperglycemia. Found to be in diabetic ketoacidosis.   Given large-volume IV fluids started on Units/hr Intravenous New Bag 10/7/20 2145)   dextrose 50 % IV solution (has no administration in time range)   potassium chloride 10 mEq/100 mL IVPB (Peripheral Line) (has no administration in time range)   magnesium sulfate 1 g in dextrose 5% 100 mL IVPB (has no administration in time range)   sodium phosphate 10 mmol in dextrose 5 % 250 mL IVPB (has no administration in time range)     Or   sodium phosphate 15 mmol in dextrose 5 % 250 mL IVPB (has no administration in time range)     Or   sodium phosphate 20 mmol in dextrose 5 % 500 mL IVPB (has no administration in time range)   0.9 % sodium chloride bolus (0 mLs Intravenous Stopped 10/7/20 2048)   Tetanus-Diphth-Acell Pertussis (BOOSTRIX) injection 0.5 mL (0.5 mLs Intramuscular Given 10/7/20 1901)   fluconazole (DIFLUCAN) tablet 200 mg (200 mg Oral Given 10/7/20 2105)        CLINICAL IMPRESSION  1. Diabetic ketoacidosis without coma associated with type 2 diabetes mellitus ([facility]    2. Abrasion of left lower leg, initial encounter    3. Yeast UTI        DISPOSITION  Admission      This chart was created using Dragon dictation software. Efforts were made by me to ensure accuracy, however some errors may be present due to limitations of this technology.            Colby Richards MD  10/07/20 3702

## 2020-10-08 NOTE — PROGRESS NOTES
Pt ok for discharge per MD. Discharge instructions given. IV removed. No questions or concerns at this time. Transported by wheelchair to personal car.

## 2020-10-08 NOTE — H&P
Hospital Medicine History & Physical      PCP: Gama Pollard    Date of Admission: 10/7/2020    Date of Service: Pt seen/examined on 10/7/2020 and Admitted to Inpatient with expected LOS greater than two midnights due to medical therapy. Chief Complaint: High blood glucose at home      History Of Present Illness:    63 y.o. female who presented to [facility] with above complaints  Patient with PMH of DM 2, breast cancer, anxiety/depression, CAD status post PCI in June 2020 presented to the ED today with complaints of high blood sugars at home. Patient reports her blood sugars have been running high over the last several days at home. She is noncompliant with her diet, often eating high carbohydrate rich foods, yesterday she ate a bunch of Oreo cookies. But reports compliance with self administering insulin daily. Patient reports lightheadedness and also reports a fall 2 days ago. She has had mild nausea and dry heaving. No diarrhea. No fevers chills or rigors. Past Medical History:          Diagnosis Date    Abnormal brain MRI 7/20/2017    Partially empty sella and minimal chronic small vessel ischemic disease    Acute bilateral low back pain without sciatica 11/2/2016    SHARRON (acute kidney injury) ([address] 7/5/2017    Arthritis     back    Bipolar disorder ([address] 10/18/2008    CAD (coronary artery disease)     stent placed 6/8/20    Cancer [facility]) 2015    bilateral breast:s/p lumpectomy/radiation:under care care of breast specialist:Dr. Boone     Carotid stenosis, bilateral:<50%:per US 7/2016 7/15/2016    Carpal tunnel syndrome 10/18/2008    Cervical cancer screening 2014    Nml per pt'.     Coronary artery disease of native artery of native heart with stable angina pectoris ([address] 6/9/2020    DDD (degenerative disc disease), lumbar 7/18/2018    Depression     under care of pschiatrist:Dr. 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  Scoliosis     Spondylosis of lumbar region without myelopathy or radiculopathy 3/10/2017    Transient cerebral ischemia 07/15/2016    TIA:7/10/16    Unspecified cerebral artery occlusion with cerebral infarction     TIA       Past Surgical History:          Procedure Laterality Date    BREAST LUMPECTOMY  2015    Bilateral:breast cancer    CARDIAC CATHETERIZATION  06/08/2020    Dr. Sidney Figueredo), DAYANA to Diag 1    HYSTERECTOMY      Benign:no cervical cancer per pt'    KIDNEY REMOVAL      right    OTHER SURGICAL HISTORY Right     orif right ankle    TUBAL LIGATION         Medications Prior to Admission:      Prior to Admission medications    Medication Sig Start Date End Date Taking? Authorizing Provider   Liraglutide (VICTOZA) 18 MG/3ML SOPN SC injection Inject 1.8 mg into the skin daily 8/6/20  Yes Tiana Lang MD   blood glucose test strips (TRUE METRIX BLOOD GLUCOSE TEST) strip USE AS DIRECTED TO TEST 4 TIMES A DAY 7/16/20  Yes Tiana Lang MD   pantoprazole (PROTONIX) 40 MG tablet Take 1 tablet by mouth every morning (before breakfast) 7/11/20  Yes Barbara Barron MD   nitroGLYCERIN (NITROSTAT) 0.4 MG SL tablet up to max of 3 total doses.  If no relief after 1 dose, call 911. 7/11/20  Yes Barbara Barron MD   Insulin Pen Needle (UNIFINE PENTIPS) 32G X 4 MM MISC USE AS DIRECTED 3 TIMES A DAY 6/30/20  Yes Tiana Lang MD   simvastatin (ZOCOR) 20 MG tablet Take 20 mg by mouth nightly   Yes Historical Provider, MD   cetirizine (ZYRTEC) 10 MG tablet Take 10 mg by mouth daily   Yes Historical Provider, MD   lisinopril (PRINIVIL;ZESTRIL) 10 MG tablet Take 10 mg by mouth daily   Yes Historical Provider, MD   Empagliflozin-metFORMIN HCl 12.5-1000 MG TABS Take 2 tablets by mouth daily 6/25/20  Yes Adithya Gonzalez MD   Blood Pressure Monitor GINA Check BP at home once or twice a day 5/1/20  Yes Adithya Gonzalez MD   insulin glargine (LANTUS SOLOSTAR) 100 UNIT/ML injection pen Inject 22 Units into the skin every morning  Patient taking differently: Inject 25 Units into the skin every morning  3/1/20  Yes Adithya Gonzalez MD   Insulin Syringe-Needle U-100 31G X 5/16\" 0.3 ML MISC USE 3 TIMES A DAY BEFORE MEALS 1/31/20  Yes Adithya Gonzalez MD   calcium carbonate-vitamin D (CALTRATE) 600-400 MG-UNIT TABS per tab  12/30/19  Yes Historical Provider, MD   glucagon 1 MG injection Inject 1 mg into the muscle See Admin Instructions Follow package directions for low blood sugar. 2/19/19  Yes Adithya Gonzalez MD   clonazePAM (KLONOPIN) 1 MG tablet Take 1 mg by mouth as needed. .   Yes Historical Provider, MD   paliperidone (INVEGA) 9 MG extended release tablet Take 9 mg by mouth every morning  1/22/18  Yes Historical Provider, MD   aspirin 81 MG tablet Take 81 mg by mouth daily   Yes Historical Provider, MD   gabapentin (NEURONTIN) 600 MG tablet Take 600 mg by mouth 3 times daily   Yes Historical Provider, MD   oxyCODONE-acetaminophen (PERCOCET) 5-325 MG per tablet Take 1 tablet by mouth every 6 hours as needed for Pain.  .   Yes Historical Provider, MD   traZODone (DESYREL) 100 MG tablet Take 100 mg by mouth nightly   Yes Historical Provider, MD   ticagrelor (BRILINTA) 90 MG TABS tablet Take 1 tablet by mouth 2 times daily 6/29/20   SARTHAK Tom - CNP   letrozole [facility]) 2.5 MG tablet TAKE 1 TABLET BY MOUTH EVERY DAY 2/1/19   Historical Provider, MD       Allergies:  Morphine; Penicillins; Codeine; and Penicillin g    Social History:      The patient currently lives at home    TOBACCO:   reports that she quit smoking about 6 years ago. Her smoking use included cigarettes and cigars. She has a 10.00 pack-year smoking history. She has never used smokeless tobacco.  ETOH:   reports no history of alcohol use. E-Cigarettes Vaping or Juuling     Questions Responses    Vaping Use Never User    Start Date     Does device contain nicotine? Never    Quit Date     Vaping Type             Family History:    Positive as follows:        Problem Relation Age of Onset    Cancer Mother         breast    Cancer Father     Heart Failure Neg Hx     High Cholesterol Neg Hx     Hypertension Neg Hx     Migraines Neg Hx     Rashes/Skin Problems Neg Hx     Seizures Neg Hx     Stroke Neg Hx     Thyroid Disease Neg Hx     Diabetes Neg Hx        REVIEW OF SYSTEMS:   Pertinent positives as noted in the HPI. All other systems reviewed and negative. PHYSICAL EXAM PERFORMED:    BP (!) 129/54   Pulse 62   Temp 99.1 °F (37.3 °C) (Oral)   Resp 18   Ht 5' 3\" (1.6 m)   Wt 143 lb (64.9 kg)   SpO2 99%   BMI 25.33 kg/m²     General appearance:  No apparent distress, appears stated age and cooperative. HEENT:  Normal cephalic, atraumatic without obvious deformity. Pupils equal, round, and reactive to light. Extra ocular muscles intact. Conjunctivae/corneas clear. Neck: Supple, with full range of motion. No jugular venous distention. Trachea midline. Respiratory:  Normal respiratory effort. Clear to auscultation, bilaterally without Rales/Wheezes/Rhonchi. Cardiovascular:  Regular rate and rhythm with normal S1/S2 without murmurs, rubs or gallops. Abdomen: Soft, non-tender, non-distended with normal bowel sounds. Musculoskeletal:  No clubbing, cyanosis or edema bilaterally. Full range of motion without deformity.   Skin: Skin color, texture, turgor normal.  No rashes or lesions. Neurologic:  Neurovascularly intact without any focal sensory/motor deficits. Cranial nerves: II-XII intact, grossly non-focal.  Psychiatric:  Alert and oriented, thought content appropriate, normal insight  Capillary Refill: Brisk,< 3 seconds   Peripheral Pulses: +2 palpable, equal bilaterally       Labs:     Recent Labs     10/07/20  1822   WBC 6.7   HGB 10.0*   HCT 32.3*        Recent Labs     10/07/20  1822   *   K 4.4   CL 96*   CO2 15*   BUN 35*   CREATININE 1.3*   CALCIUM 9.0   PHOS 3.6     Recent Labs     10/07/20  1822   AST 24   ALT 42*   BILITOT 0.4   ALKPHOS 167*     No results for input(s): INR in the last 72 hours. Recent Labs     10/07/20  1822   TROPONINI <0.01       Urinalysis:      Lab Results   Component Value Date    NITRU Negative 10/07/2020    WBCUA 0-2 10/07/2020    BACTERIA Rare 10/07/2020    RBCUA 5-10 10/07/2020    BLOODU SMALL 10/07/2020    SPECGRAV 1.010 10/07/2020    GLUCOSEU >=1000 10/07/2020    GLUCOSEU >=1000 mg/dL 06/07/2010       Radiology:     CXR: I have reviewed the CXR with the following interpretation: No acute process  EKG:  I have reviewed the EKG with the following interpretation: NSR, rate 62, normal intervals, no acute ischemic changes    XR TIBIA FIBULA LEFT (2 VIEWS)   Preliminary Result   1. No acute abnormality of the left tibia or fibula. 2. Chronic healed fracture deformity of the proximal left fibular neck. XR CHEST PORTABLE   Final Result   No acute process.       Stable compared to the prior study             ASSESSMENT:PLAN:    Active Hospital Problems    Diagnosis Date Noted    DKA (diabetic ketoacidoses) ([facility] [E11.10] 10/07/2020     Priority: High    DKA, type 2, not at goal [facility]) [E11.10] 10/07/2020    CAD (coronary artery disease) [I25.10] 06/09/2020    Depression/anxiety [F41.8] 07/05/2017    History of breast cancer [Z85.3] 07/20/2016    Essential hypertension [I10] 06/17/2016    Bipolar disorder ([facility] [F31.9] 10/18/2008     Mild DKA  Presented with elevated blood glucose 634, bicarb 15, however anion gap 16, trace urine ketones with VBG pH at 7.32. Trigger being likely dietary noncompliance, consuming carbohydrate rich foods  -Insulin drip per DKA protocol  -Every hour Accu-Cheks every 4 hour electrolytes  -Aggressive IV fluids  -N.p.o. except ice chips/water  -Diabetic educator referral    DM2-uncontrolled  Currently in DKA. Last A1c in June 7.3  Check A1c now  Diabetes education consult    CAD  S/p PCI to diagonal in June 2020  -stable and asymptomatic  -Resume DAPT, statin, lisinopril. Not on BB due to bradycardia    History of breast cancer  -Continue letrozole    Hyperlipidemia-resume statin    Anxiety/depression  -continue klonopin, trazadone and invega    DVT Prophylaxis: Lovenox  Diet: Diet NPO Effective Now Exceptions are: Ice Chips, Sips with Meds  Code Status: Full Code    PT/OT Eval Status: Ambulatory    Dispo -IP stay    Total critical care time caring for this patient with life threatening, unstable organ failure, including direct patient contact, management of life support systems, review of data including imaging and labs, discussions with other team members and physicians at least 34 min so far today, excluding procedures. Shirley Diego MD    Thank you Eunice Hill for the opportunity to be involved in this patient's care. If you have any questions or concerns please feel free to contact me at [phone].

## 2020-10-08 NOTE — DISCHARGE SUMMARY
Hospital Medicine Discharge Summary    Patient ID: Rosa Franklin      Patient's PCP: Sindi Hopper Date: 10/7/2020     Discharge Date:   10/08/20     Admitting Physician: Jose Carlos Georges MD     Discharge Physician: Cece Ravi MD     Discharge Diagnoses: Active Hospital Problems    Diagnosis    DKA (diabetic ketoacidoses) ([facility] [E11.10]    DKA, type 2, not at goal [facility]) [E11.10]    CAD (coronary artery disease) [I25.10]    Depression/anxiety [F41.8]    History of breast cancer [Z85.3]    Essential hypertension [I10]    Bipolar disorder ([facility] [F31.9]       The patient was seen and examined on day of discharge and this discharge summary is in conjunction with any daily progress note from day of discharge. Hospital Course: Patient was admitted with diabetic ketoacidosis after eating a large amount of cookies. Compliant with medications but not with diet. DKA resolved overnight with IV insulin. Following morning, patient's basic metabolic panel once normal.  Blood sugar was better controlled. No acute infection was detected  Patient will be discharged home with recommendations to be compliant with diet. Creatinine down to 1.2  Vitals are controlled and within normal limits  Stable condition. Physical Exam Performed:     /64   Pulse 61   Temp 97.8 °F (36.6 °C) (Oral)   Resp 16   Ht 5' 3\" (1.6 m)   Wt 143 lb (64.9 kg)   SpO2 98%   BMI 25.33 kg/m²       General appearance:  No apparent distress, appears stated age and cooperative. HEENT:  Normal cephalic, atraumatic without obvious deformity. Pupils equal, round, and reactive to light. Extra ocular muscles intact. Conjunctivae/corneas clear. Neck: Supple, with full range of motion. No jugular venous distention. Trachea midline. Respiratory:  Normal respiratory effort. Clear to auscultation, bilaterally without Rales/Wheezes/Rhonchi.   Cardiovascular:  Regular rate and rhythm with normal S1/S2 without murmurs, rubs or gallops. Abdomen: Soft, non-tender, non-distended with normal bowel sounds. Musculoskeletal:  No clubbing, cyanosis or edema bilaterally. Full range of motion without deformity. Skin: Skin color, texture, turgor normal.  No rashes or lesions. Neurologic:  Neurovascularly intact without any focal sensory/motor deficits. Cranial nerves: II-XII intact, grossly non-focal.  Psychiatric:  Alert and oriented, thought content appropriate, normal insight  Capillary Refill: Brisk,< 3 seconds   Peripheral Pulses: +2 palpable, equal bilaterally       Labs: For convenience and continuity at follow-up the following most recent labs are provided:      CBC:    Lab Results   Component Value Date    WBC 6.7 10/07/2020    HGB 10.0 10/07/2020    HCT 32.3 10/07/2020     10/07/2020       Renal:    Lab Results   Component Value Date     10/08/2020    K 4.1 10/08/2020    K 4.4 08/04/2020     10/08/2020    CO2 21 10/08/2020    BUN 25 10/08/2020    CREATININE 1.2 10/08/2020    CALCIUM 8.6 10/08/2020    PHOS 3.2 10/08/2020         Significant Diagnostic Studies    Radiology:   XR TIBIA FIBULA LEFT (2 VIEWS)   Final Result   1. No acute abnormality of the left tibia or fibula. 2. Chronic healed fracture deformity of the proximal left fibular neck. XR CHEST PORTABLE   Final Result   No acute process.       Stable compared to the prior study                Consults:     IP CONSULT TO HOSPITALIST  IP CONSULT TO DIETITIAN    Disposition: Home    Condition at Discharge: Stable    Discharge Instructions/Follow-up: Primary care physician    Code Status:  Full Code     Activity: activity as tolerated    Diet: diabetic diet      Discharge Medications:     Current Discharge Medication List           Details   insulin glargine (LANTUS SOLOSTAR) 100 UNIT/ML injection pen Inject 25 Units into the skin every morning  Qty: 1 pen, Refills: 1    Associated Diagnoses: Uncontrolled type 2 diabetes mellitus with microalbuminuria, with long-term current use of insulin ([facility])              Details   Liraglutide (VICTOZA) 18 MG/3ML SOPN SC injection Inject 1.8 mg into the skin daily  Qty: 3 pen, Refills: 5    Associated Diagnoses: Uncontrolled type 2 diabetes mellitus with microalbuminuria, with long-term current use of insulin ([address] Uncontrolled type 2 diabetes mellitus with diabetic nephropathy, with long-term current use of insulin ([facility] 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 ([facility] Dyslipidemia associated with type 2 diabetes mellitus ([address] Diabetic polyneuropathy associated with type 2 diabetes mellitus ([facility] Essential hypertension; Microalbuminuria; Morbid obesity due to excess calories ([facility])      blood glucose test strips (TRUE METRIX BLOOD GLUCOSE TEST) strip USE AS DIRECTED TO TEST 4 TIMES A DAY  Qty: 100 strip, Refills: 5    Associated Diagnoses: Uncontrolled type 2 diabetes mellitus with microalbuminuria, with long-term current use of insulin ([facility])      pantoprazole (PROTONIX) 40 MG tablet Take 1 tablet by mouth every morning (before breakfast)  Qty: 30 tablet, Refills: 3      nitroGLYCERIN (NITROSTAT) 0.4 MG SL tablet up to max of 3 total doses. If no relief after 1 dose, call 911. Qty: 25 tablet, Refills: 3      Insulin Pen Needle (UNIFINE PENTIPS) 32G X 4 MM MISC USE AS DIRECTED 3 TIMES A DAY  Qty: 100 each, Refills: 6    Associated Diagnoses: Uncontrolled type 2 diabetes mellitus with microalbuminuria, with long-term current use of insulin ([facility])      simvastatin (ZOCOR) 20 MG tablet Take 20 mg by mouth nightly      cetirizine (ZYRTEC) 10 MG tablet Take 10 mg by mouth daily      lisinopril (PRINIVIL;ZESTRIL) 10 MG tablet Take 10 mg by mouth daily      Empagliflozin-metFORMIN HCl 12.5-1000 MG TABS Take 2 tablets by mouth daily  Qty: 60 tablet, Refills: 5    Associated Diagnoses: Uncontrolled type 2 diabetes mellitus with microalbuminuria, with long-term current use of insulin ([facility]  Uncontrolled type 2 diabetes mellitus with diabetic nephropathy, with long-term current use of insulin ([facility] 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 ([facility])      Blood Pressure Monitor GINA Check BP at home once or twice a day  Qty: 1 Device, Refills: 0    Associated Diagnoses: Essential hypertension; Microalbuminuria; 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 ([facility])      Insulin Syringe-Needle U-100 31G X 5/16\" 0.3 ML MISC USE 3 TIMES A DAY BEFORE MEALS  Qty: 90 each, Refills: 7      calcium carbonate-vitamin D (CALTRATE) 600-400 MG-UNIT TABS per tab       glucagon 1 MG injection Inject 1 mg into the muscle See Admin Instructions Follow package directions for low blood sugar. Qty: 1 kit, Refills: 0    Associated Diagnoses: Uncontrolled type 2 diabetes mellitus with microalbuminuria, with long-term current use of insulin ([address] Uncontrolled type 2 diabetes mellitus with diabetic nephropathy, with long-term current use of insulin ([address] 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 ([facility])      clonazePAM (KLONOPIN) 1 MG tablet Take 1 mg by mouth as needed. .      paliperidone (INVEGA) 9 MG extended release tablet Take 9 mg by mouth every morning       aspirin 81 MG tablet Take 81 mg by mouth daily      gabapentin (NEURONTIN) 600 MG tablet Take 600 mg by mouth 3 times daily      oxyCODONE-acetaminophen (PERCOCET) 5-325 MG per tablet Take 1 tablet by mouth every 6 hours as needed for Pain. .      traZODone (DESYREL) 100 MG tablet Take 100 mg by mouth nightly      ticagrelor (BRILINTA) 90 MG TABS tablet Take 1 tablet by mouth 2 times daily  Qty: 60 tablet, Refills: 11      letrozole (FEMARA) 2.5 MG tablet TAKE 1 TABLET BY MOUTH EVERY DAY  Refills: 3             Time Spent on discharge is more than 45 minutes in the examination, evaluation, counseling and review of medications and discharge plan.       Signed:    Jeronimo Dang MD   10/8/2020      Thank you Shasha Castillo for the opportunity to be involved in this patient's care. If you have any questions or concerns please feel free to contact me at [phone].

## 2020-10-08 NOTE — PLAN OF CARE
Problem: Falls - Risk of:  Goal: Will remain free from falls  Description: Will remain free from falls  10/8/2020 0823 by Kaylyn Pedraza RN  Outcome: Ongoing  Note: Pt remains free from fall and injury. Non-skid slippers on. Fall risk band on wrist, bed alarm in use. Instructed to call for assistance. Call light in reach, will monitor.     10/8/2020 2665 by Marlene Benavides  Outcome: Ongoing  Goal: Absence of physical injury  Description: Absence of physical injury  10/8/2020 2487 by Kaylyn Pedraza RN  Outcome: Ongoing  10/8/2020 0628 by Marlene Benavides  Outcome: Ongoing     Problem: Discharge Planning:  Goal: Discharged to appropriate level of care  Description: Discharged to appropriate level of care  10/8/2020 0823 by Kaylyn Pedraza RN  Outcome: Ongoing  10/8/2020 0628 by Marlene Benavides  Outcome: Ongoing     Problem: Fluid Volume - Imbalance:  Goal: Will remain free of signs and symptoms of dehydration  Description: Will remain free of signs and symptoms of dehydration  10/8/2020 0823 by Kaylyn Pedraza RN  Outcome: Ongoing  10/8/2020 0628 by Marlene Benavides  Outcome: Ongoing  Goal: Absence of imbalanced fluid volume signs and symptoms  Description: Absence of imbalanced fluid volume signs and symptoms  10/8/2020 0823 by Kaylyn Pedraza RN  Outcome: Ongoing  10/8/2020 0628 by Marlene Benavides  Outcome: Ongoing     Problem: Serum Glucose Level - Abnormal:  Goal: Ability to maintain appropriate glucose levels will improve  Description: Ability to maintain appropriate glucose levels will improve  10/8/2020 0823 by Kaylyn Pedraza RN  Outcome: Ongoing  10/8/2020 0628 by Marlene Benavides  Outcome: Ongoing

## 2020-10-08 NOTE — PROGRESS NOTES
10/8/20 8:43AM  [phone] Hospital or Facility: [facility] From: Ghislaine Kulkarni RE: Irene Bush RM: [phone] Pt admitted for DKA protocol. At admission anion gap was closed, and has remained closed for past three sets of labs (12, 11, & 9). Past two CO2 lvls were 20 & 21. Would you like to discontinue insulin gtt & DKA protocol?  Thanks Need Callback: NO CALLBACK REQ CCU

## 2020-10-08 NOTE — CARE COORDINATION
Writer aware of discharge order. Spoke to Huoli who stated patient is independent and without needs. Please advise should any needs arise.  Jose Mendieta RN

## 2020-10-09 LAB
ESTIMATED AVERAGE GLUCOSE: 294.8 MG/DL
HBA1C MFR BLD: 11.9 %

## 2020-10-15 NOTE — PROCEDURES
Brief Pre-Op Note/Sedation Assessment      Mitesh Rosado  1960  0219/0219[birth year]01      [id number]  8:28 AM    Planned Procedure: Cardiac Catheterization Procedure    Post Procedure Plan: Return to same level of care    Consent: I have discussed with the patient and/or the patient representative the indication, alternatives, and the possible risks and/or complications of the planned procedure and the anesthesia methods. The patient and/or patient representative appear to understand and agree to proceed. Chief Complaint: Chest Pain/Pressure      Indications for Cath Procedure:  ACS > 24 hrs  Anginal Classification within 2 weeks:  CCS IV - Inability to perform any activity without angina or angina at rest, i.e., severe limitation  NYHA Heart Failure Class within 2 weeks: Class II - Symptoms of HF on ordinary exertion, Newly Diagnosed? No, Heart Failure Type: Diastolic  Is Cath Lab Visit Valve-related?: No  Surgical Risk: N/A  Functional Type: N/A    Anti- Anginal Meds within 2 weeks:   Yes: Aspirin and Statin (Any)   BB contraindicated    Stress or Imaging Studies Performed:  Stress Test with SPECT Result: Positive:  inferolateral Risk/Extent of Ischemia:  Low      Vital Signs:  BP (!) 152/71   Pulse 71   Temp 98 °F (36.7 °C) (Oral)   Resp 16   Ht 5' 3\" (1.6 m)   Wt 149 lb 11.2 oz (67.9 kg)   SpO2 99%   BMI 26.52 kg/m²     Allergies:   Allergies   Allergen Reactions    Morphine Anaphylaxis and Hives     feels like throat is closing    Penicillins Hives and Swelling    Codeine Hives and Rash    Penicillin G Rash       Past Medical History:  Past Medical History:   Diagnosis Date    Abnormal brain MRI 7/20/2017    Partially empty sella and minimal chronic small vessel ischemic disease    Acute bilateral low back pain without sciatica 11/2/2016    SHARRON (acute kidney injury) ([facility] [address] 7/5/2017    Arthritis     back    Bipolar disorder ([facility] [address] 10/18/2008    Cancer ([facility] [address] 2015    bilateral breast:s/p lumpectomy/radiation:under care care of breast specialist:Dr. Boone     Carotid stenosis, bilateral:<50%:per US 7/2016 7/15/2016    Carpal tunnel syndrome 10/18/2008    Cervical cancer screening 2014    Nml per pt'.  DDD (degenerative disc disease), lumbar 7/18/2018    Depression     under care of pschiatrist:Dr. 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     Scoliosis     Spondylosis of lumbar region without myelopathy or radiculopathy 3/10/2017    Transient cerebral ischemia 07/15/2016    TIA:7/10/16    Unspecified cerebral artery occlusion with cerebral infarction     TIA         Surgical History:  Past Surgical History:   Procedure Laterality Date    BREAST LUMPECTOMY  2015    Bilateral:breast cancer    HYSTERECTOMY      Benign:no cervical cancer per pt'    KIDNEY REMOVAL      right    OTHER SURGICAL HISTORY Right     orif right ankle    TUBAL LIGATION           Medications:  Current Facility-Administered Medications   Medication Dose Route Frequency Provider Last Rate Last Dose    sodium chloride 0.9 % infusion             0.9 % sodium chloride infusion   Intravenous Continuous Erika Izaguirre APRN - CNP        sodium chloride flush 0.9 % injection 10 mL  10 mL Intravenous 2 times per day Erika Izaguirre APRN - CNP        sodium chloride flush 0.9 % injection 10 mL  10 mL Intravenous PRN Erika Izaguirre APRN - CNP        perflutren lipid microspheres SARTHAK Montes - CNP   100 mg at 06/07/20 [id number]           Pre-Sedation:    Pre-Sedation Documentation and Exam:  I have assessed the patient and agree with the H&P present on the chart. Prior History of Anesthesia Complications:   none    Modified Mallampati:  II (soft palate, uvula, fauces visible)    ASA Classification:  Class 3 - A patient with severe systemic disease that limits activity but is not incapacitating      Marnie Scale: Activity:  2 - Able to move 4 extremities voluntarily on command  Respiration:  2 - Able to breathe deeply and cough freely  Circulation:  2 - BP+/- 20mmHg of normal  Consciousness:  2 - Fully awake  Oxygen Saturation (color):  2 - Able to maintain oxygen saturation >92% on room air    Sedation/Anesthesia Plan:  Guard the patient's safety and welfare. Minimize physical discomfort and pain. Minimize negative psychological responses to treatment by providing sedation and analgesia and maximize the potential amnesia. Patient to meet pre-procedure discharge plan.     Medication Planned:  midazolam intravenously and fentanyl intravenously    Patient is an appropriate candidate for plan of sedation: yes      Electronically signed by Bard Adele MD on 6/8/2020 at 8:28 AM povidone iodine (if allergic to chlorhexidine)

## 2020-11-04 ENCOUNTER — HOSPITAL ENCOUNTER (OUTPATIENT)
Age: 60
Discharge: HOME | End: 2020-11-04
Payer: MEDICAID

## 2020-11-04 ENCOUNTER — HOSPITAL ENCOUNTER (OUTPATIENT)
Dept: GENERAL RADIOLOGY | Age: 60
Discharge: HOME | End: 2020-11-04
Payer: MEDICAID

## 2020-11-04 PROBLEM — Z95.5 HX OF HEART ARTERY STENT: Status: ACTIVE | Noted: 2020-06-19

## 2020-11-04 PROCEDURE — 71046 X-RAY EXAM CHEST 2 VIEWS: CPT

## 2020-11-05 ENCOUNTER — VIRTUAL VISIT (OUTPATIENT)
Dept: ENDOCRINOLOGY | Age: 60
End: 2020-11-05
Payer: MEDICAID

## 2020-11-05 PROBLEM — R63.4 UNINTENTIONAL WEIGHT LOSS: Status: ACTIVE | Noted: 2020-11-05

## 2020-11-05 PROCEDURE — G8417 CALC BMI ABV UP PARAM F/U: HCPCS | Performed by: INTERNAL MEDICINE

## 2020-11-05 PROCEDURE — 2022F DILAT RTA XM EVC RTNOPTHY: CPT | Performed by: INTERNAL MEDICINE

## 2020-11-05 PROCEDURE — 3017F COLORECTAL CA SCREEN DOC REV: CPT | Performed by: INTERNAL MEDICINE

## 2020-11-05 PROCEDURE — G8427 DOCREV CUR MEDS BY ELIG CLIN: HCPCS | Performed by: INTERNAL MEDICINE

## 2020-11-05 PROCEDURE — G8484 FLU IMMUNIZE NO ADMIN: HCPCS | Performed by: INTERNAL MEDICINE

## 2020-11-05 PROCEDURE — 3046F HEMOGLOBIN A1C LEVEL >9.0%: CPT | Performed by: INTERNAL MEDICINE

## 2020-11-05 PROCEDURE — 99214 OFFICE O/P EST MOD 30 MIN: CPT | Performed by: INTERNAL MEDICINE

## 2020-11-05 PROCEDURE — 1111F DSCHRG MED/CURRENT MED MERGE: CPT | Performed by: INTERNAL MEDICINE

## 2020-11-05 PROCEDURE — 1036F TOBACCO NON-USER: CPT | Performed by: INTERNAL MEDICINE

## 2020-11-05 NOTE — PROGRESS NOTES
Patient ID:   Michelle Coburn is a 63 y.o. female  Chief Complaint:   Michelle Coburn presents for an evaluation of Type 2 Diabetes Mellitus , Hyperlipidemia and hypertension. Pursuant to the emergency declaration under the [facility], [address] [place] [address] and the [employer] and [employer] Act this visit was conducted after obtaining verbal consent, with the use of D[employer]. me interactive audio and video telecommunications system that permits real time communication between the patient and provider to substitute for an in-person clinic visit to reduce the patient's risk of exposure to COVID-19 and provide necessary medical care. Because this was a Telehealth visit, evaluation of the following organ systems was limited: Vitals, Constitutional, EENT, Resp, CV, GI, , MS, Neuro, Skin. Heme. Lymph, Imm. Michelle Coburn was at home. Provider was at [facility] office. No one else was involved. The patient has also been advised to contact this office for worsening conditions or problems, and seek emergency medical treatment and/or call 911 if deemed necessary. Subjective:   Type 2 Diabetes Mellitus diagnosed around 2010  On insulin since 2012  Previous regimen:Taking Admelog 15 units tidac and 5 units for snacks. Basaglar 40 units once a day at night. VGO stopped due to hypoglycemias     Humalog stopped as she was missing lantus dose with Humalogs     She brought in her in Aug 2020     In June 2020 [address] and Bernkate Mershayan was stopped   Metformin and jardiance were change to synjardy but she did not      Lantus 25 units daily in am , instead of 30 units   Synjardy 12.5-1000mg, she picked up 30 day supply in Aug 2020. She says she is taking it. She is taking one tab instead of two. Victoza 1.8 mg daily in AM      Admits making poor dietary choices, trying to cut down fried.    Multiple cancellations/no shows      Hospitalization July 2020 for high blood sugars. she thinks she was eating too much carbs     Checks blood sugars 2-3 times per day. Reportedly 250-300    AM:     Lunch:   Supper:    HS:        Hypoglycemias: Once in last 4 weeks      Meals: 3, dinner is big. Snacks (jellos, fruits, pretzels) after every meal because she is hungry. Exercise: None    Denies chest pain, exertional dyspnea. Family history of CAD: Both parents  Denies smoking/ alcohol.    Currently on  mg daily     The following portions of the patient's history were reviewed and updated as appropriate:       Family History   Problem Relation Age of Onset    Cancer Mother         breast    Cancer Father     Heart Failure Neg Hx     High Cholesterol Neg Hx     Hypertension Neg Hx     Migraines Neg Hx     Rashes/Skin Problems Neg Hx     Seizures Neg Hx     Stroke Neg Hx     Thyroid Disease Neg Hx     Diabetes Neg Hx          Social History     Socioeconomic History    Marital status:      Spouse name: Not on file    Number of children: Not on file    Years of education: Not on file    Highest education level: Not on file   Occupational History    Occupation:    Social Needs    Financial resource strain: Not on file    Food insecurity     Worry: Not on file     Inability: Not on file    Transportation needs     Medical: Not on file     Non-medical: Not on file   Tobacco Use    Smoking status: Former Smoker     Packs/day: 0.50     Years: 20.00     Pack years: 10.00     Types: Cigarettes, Cigars     Last attempt to quit: 7/3/2014     Years since quittin.3    Smokeless tobacco: Never Used   Substance and Sexual Activity    Alcohol use: No     Alcohol/week: 0.0 standard drinks    Drug use: No    Sexual activity: Never   Lifestyle    Physical activity     Days per week: Not on file     Minutes per session: Not on file    Stress: Not on file   Relationships    Social connections     Talks on phone: Not on file     Gets together: Not on file     Attends Hinduism service: Not on file     Active member of club or organization: Not on file     Attends meetings of clubs or organizations: Not on file     Relationship status: Not on file    Intimate partner violence     Fear of current or ex partner: Not on file     Emotionally abused: Not on file     Physically abused: Not on file     Forced sexual activity: Not on file   Other Topics Concern    Not on file   Social History Narrative    Not on file       Past Medical History:   Diagnosis Date    Abnormal brain MRI 7/20/2017    Partially empty sella and minimal chronic small vessel ischemic disease    Acute bilateral low back pain without sciatica 11/2/2016    SHARRON (acute kidney injury) ([facility] 7/5/2017    Arthritis     back    Bipolar disorder ([facility] 10/18/2008    CAD (coronary artery disease)     stent placed 6/8/20    Cancer ([facility] 2015    bilateral breast:s/p lumpectomy/radiation:under care care of breast specialist:Dr. Boone     Carotid stenosis, bilateral:<50%:per US 7/2016 7/15/2016    Carpal tunnel syndrome 10/18/2008    Cervical cancer screening 2014    Nml per pt'.  Coronary artery disease of native artery of native heart with stable angina pectoris ([facility] 6/9/2020    DDD (degenerative disc disease), lumbar 7/18/2018    Depression     under care of pschiatrist:Dr. 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     Scoliosis     Spondylosis of lumbar region without myelopathy or radiculopathy 3/10/2017    Transient cerebral ischemia 07/15/2016    TIA:7/10/16    Unspecified cerebral artery occlusion with cerebral infarction     TIA       Past Surgical History:   Procedure Laterality Date    BREAST LUMPECTOMY  2015    Bilateral:breast cancer    CARDIAC CATHETERIZATION  06/08/2020    Dr. Angella Cunningham), DAYANA to Diag 1    HYSTERECTOMY      Benign:no cervical cancer per pt'    KIDNEY REMOVAL      right    OTHER SURGICAL HISTORY Right     orif right ankle    TUBAL LIGATION           Allergies   Allergen Reactions    Morphine Anaphylaxis and Hives     feels like throat is closing    Penicillins Hives and Swelling    Codeine Hives and Rash    Penicillin G Rash         Current Outpatient Medications:     insulin glargine (LANTUS SOLOSTAR) 100 UNIT/ML injection pen, Inject 25 Units into the skin every morning, Disp: 1 pen, Rfl: 1    blood glucose test strips (TRUE METRIX BLOOD GLUCOSE TEST) strip, USE AS DIRECTED TO TEST 4 TIMES A DAY, Disp: 100 strip, Rfl: 5    pantoprazole (PROTONIX) 40 MG tablet, Take 1 tablet by mouth every morning (before breakfast), Disp: 30 tablet, Rfl: 3    nitroGLYCERIN (NITROSTAT) 0.4 MG SL tablet, up to max of 3 total doses.  If no relief after 1 dose, call 911., Disp: 25 tablet, Rfl: 3    Insulin Pen Needle (UNIFINE PENTIPS) 32G X 4 MM MISC, USE AS DIRECTED 3 TIMES A DAY, Disp: 100 each, Rfl: 6    ticagrelor (BRILINTA) 90 MG TABS tablet, Take 1 tablet by mouth 2 times daily, Disp: 60 tablet, Rfl: 11    simvastatin (ZOCOR) 20 MG tablet, Take 20 mg by mouth nightly, Disp: , Rfl:     cetirizine (ZYRTEC) 10 MG tablet, Take 10 mg by mouth daily, Disp: , Rfl:     lisinopril (PRINIVIL;ZESTRIL) 10 MG tablet, Take 10 mg by mouth daily, Disp: , Rfl:     Empagliflozin-metFORMIN HCl 12.5-1000 MG TABS, Take 2 tablets by mouth daily, Disp: 60 tablet, Rfl: 5    Blood Pressure Monitor GINA Check BP at home once or twice a day, Disp: 1 Device, Rfl: 0    Insulin Syringe-Needle U-100 31G X 5/16\" 0.3 ML MISC, USE 3 TIMES A DAY BEFORE MEALS, Disp: 90 each, Rfl: 7    calcium carbonate-vitamin D (CALTRATE) 600-400 MG-UNIT TABS per tab, , Disp: , Rfl:     letrozole (FEMARA) 2.5 MG tablet, TAKE 1 TABLET BY MOUTH EVERY DAY, Disp: , Rfl: 3    glucagon 1 MG injection, Inject 1 mg into the muscle See Admin Instructions Follow package directions for low blood sugar., Disp: 1 kit, Rfl: 0    clonazePAM (KLONOPIN) 1 MG tablet, Take 1 mg by mouth as needed. ., Disp: , Rfl:     paliperidone (INVEGA) 9 MG extended release tablet, Take 9 mg by mouth every morning , Disp: , Rfl:     aspirin 81 MG tablet, Take 81 mg by mouth daily, Disp: , Rfl:     gabapentin (NEURONTIN) 600 MG tablet, Take 600 mg by mouth 3 times daily, Disp: , Rfl:     oxyCODONE-acetaminophen (PERCOCET) 5-325 MG per tablet, Take 1 tablet by mouth every 6 hours as needed for Pain. ., Disp: , Rfl:     traZODone (DESYREL) 100 MG tablet, Take 100 mg by mouth nightly, Disp: , Rfl:     Liraglutide (VICTOZA) 18 MG/3ML SOPN SC injection, Inject 1.8 mg into the skin daily (Patient not taking: Reported on 11/5/2020), Disp: 3 pen, Rfl: 5      Review of Systems:    Constitutional: Negative for fever, chills, and unexpected weight change. HENT: Negative for congestion, ear pain, rhinorrhea,  sore throat and trouble swallowing. Eyes: Negative for photophobia, redness, itching. Respiratory: Negative for cough, shortness of breath and sputum. Cardiovascular: Negative for chest pain, palpitations and leg swelling. Gastrointestinal: Negative for nausea, vomiting, abdominal pain, diarrhea, constipation. Endocrine: Negative for cold intolerance, heat intolerance, polydipsia, polyphagia and polyuria. Genitourinary: Negative for dysuria, urgency, frequency, hematuria and flank pain.    Musculoskeletal: Negative for myalgias, back pain, arthralgias and neck pain. Skin/Nail: Negative for rash, itching. Normal nails. Neurological: Negative for seizures, weakness, light-headedness, numbness and headaches. Hematological/ Lymph nodes: Negative for adenopathy. Does not bruise/bleed easily. Psychiatric/Behavioral: Negative for suicidal ideas, depression, anxiety, sleep disturbance and decreased concentration. Objective:   Physical Exam:  There were no vitals taken for this visit. Constitutional: Patient is oriented to person, place, and time. Patient appears well-developed and well-nourished. HENT:               Head: Normocephalic and atraumatic. Eyes: Conjunctivae and EOM are normal.                Neck: Normal range of motion. Thyroid exam normal.   Cardiovascular: Normal rate, regular rhythm and normal heart sounds. Pulmonary/Chest: Effort normal and breath sounds normal.   Abdominal: Soft. Bowel sounds are normal.   Musculoskeletal: Normal range of motion. Neurological: Patient is alert and oriented to person, place, and time. Patient has normal reflexes. Skin: Skin is warm and dry. Psychiatric: Patient has a normal mood and affect.  Patient behavior is normal.     Lab Review:    Admission on 10/07/2020, Discharged on 10/08/2020   Component Date Value Ref Range Status    WBC 10/07/2020 6.7  4.0 - 11.0 K/uL Final    RBC 10/07/2020 3.58* 4.00 - 5.20 M/uL Final    Hemoglobin 10/07/2020 10.0* 12.0 - 16.0 g/dL Final    Hematocrit 10/07/2020 32.3* 36.0 - 48.0 % Final    MCV 10/07/2020 90.1  80.0 - 100.0 fL Final    MCH 10/07/2020 27.9  26.0 - 34.0 pg Final    MCHC 10/07/2020 30.9* 31.0 - 36.0 g/dL Final    RDW 10/07/2020 13.5  12.4 - 15.4 % Final    Platelets 66/36/3201 163  135 - 450 K/uL Final    MPV 10/07/2020 10.4  5.0 - 10.5 fL Final    Neutrophils % 10/07/2020 76.4  % Final    Lymphocytes % 10/07/2020 19.0  % Final    Monocytes % 10/07/2020 4.0  % Final    Eosinophils % 10/07/2020 0.1  % Final    Basophils  Troponin 10/07/2020 <0.01  <0.01 ng/mL Final    POC Glucose 10/07/2020 544* 70 - 99 mg/dl Final    Performed on 10/07/2020 ACCU-CHEK   Final    Sodium 10/07/2020 137  136 - 145 mmol/L Final    Potassium 10/07/2020 3.0* 3.5 - 5.1 mmol/L Final    Chloride 10/07/2020 104  99 - 110 mmol/L Final    CO2 10/07/2020 21  21 - 32 mmol/L Final    Anion Gap 10/07/2020 12  3 - 16 Final    Glucose 10/07/2020 127* 70 - 99 mg/dL Final    BUN 10/07/2020 28* 7 - 20 mg/dL Final    CREATININE 10/07/2020 1.4* 0.6 - 1.2 mg/dL Final    GFR Non- 10/07/2020 38* >60 Final    GFR  10/07/2020 46* >60 Final    Calcium 10/07/2020 9.0  8.3 - 10.6 mg/dL Final    Sodium 10/08/2020 140  136 - 145 mmol/L Final    Potassium 10/08/2020 3.3* 3.5 - 5.1 mmol/L Final    Chloride 10/08/2020 109  99 - 110 mmol/L Final    CO2 10/08/2020 20* 21 - 32 mmol/L Final    Anion Gap 10/08/2020 11  3 - 16 Final    Glucose 10/08/2020 186* 70 - 99 mg/dL Final    BUN 10/08/2020 28* 7 - 20 mg/dL Final    CREATININE 10/08/2020 1.2  0.6 - 1.2 mg/dL Final    GFR Non- 10/08/2020 46* >60 Final    GFR  10/08/2020 55* >60 Final    Calcium 10/08/2020 8.7  8.3 - 10.6 mg/dL Final    Sodium 10/08/2020 137  136 - 145 mmol/L Final    Potassium 10/08/2020 4.1  3.5 - 5.1 mmol/L Final    Chloride 10/08/2020 107  99 - 110 mmol/L Final    CO2 10/08/2020 21  21 - 32 mmol/L Final    Anion Gap 10/08/2020 9  3 - 16 Final    Glucose 10/08/2020 213* 70 - 99 mg/dL Final    BUN 10/08/2020 25* 7 - 20 mg/dL Final    CREATININE 10/08/2020 1.2  0.6 - 1.2 mg/dL Final    GFR Non- 10/08/2020 46* >60 Final    GFR  10/08/2020 55* >60 Final    Calcium 10/08/2020 8.6  8.3 - 10.6 mg/dL Final    Magnesium 10/07/2020 1.90  1.80 - 2.40 mg/dL Final    Magnesium 10/08/2020 1.90  1.80 - 2.40 mg/dL Final    Magnesium 10/08/2020 1.90  1.80 - 2.40 mg/dL Final    Phosphorus 10/07/2020 2.6  2.5 - 4.9 mg/dL Final    Phosphorus 10/08/2020 3.1  2.5 - 4.9 mg/dL Final    Phosphorus 10/08/2020 3.2  2.5 - 4.9 mg/dL Final    POC Glucose 10/07/2020 419* 70 - 99 mg/dl Final    Performed on 10/07/2020 ACCU-CHEK   Final    POC Glucose 10/07/2020 305* 70 - 99 mg/dl Final    Performed on 10/07/2020 ACCU-CHEK   Final    POC Glucose 10/07/2020 195* 70 - 99 mg/dl Final    Performed on 10/07/2020 ACCU-CHEK   Final    POC Glucose 10/07/2020 126* 70 - 99 mg/dl Final    Performed on 10/07/2020 ACCU-CHEK   Final    POC Glucose 10/08/2020 136* 70 - 99 mg/dl Final    Performed on 10/08/2020 ACCU-CHEK   Final    POC Glucose 10/08/2020 165* 70 - 99 mg/dl Final    Performed on 10/08/2020 ACCU-CHEK   Final    POC Glucose 10/08/2020 180* 70 - 99 mg/dl Final    Performed on 10/08/2020 ACCU-CHEK   Final    POC Glucose 10/08/2020 191* 70 - 99 mg/dl Final    Performed on 10/08/2020 ACCU-CHEK   Final    POC Glucose 10/08/2020 186* 70 - 99 mg/dl Final    Performed on 10/08/2020 ACCU-CHEK   Final    POC Glucose 10/08/2020 209* 70 - 99 mg/dl Final    Performed on 10/08/2020 ACCU-CHEK   Final    Hemoglobin A1C 10/08/2020 11.9  See comment % Final    eAG 10/08/2020 294.8  mg/dL Final    POC Glucose 10/08/2020 172* 70 - 99 mg/dl Final    Performed on 10/08/2020 ACCU-CHEK   Final    POC Glucose 10/08/2020 138* 70 - 99 mg/dl Final    Performed on 10/08/2020 ACCU-CHEK   Final   Office Visit on 08/06/2020   Component Date Value Ref Range Status    SARS-CoV-2, ANTONI 08/06/2020 NOT DETECTED  NOT DETECTED Final   Admission on 08/04/2020, Discharged on 08/05/2020   Component Date Value Ref Range Status    POC Glucose 08/04/2020 587* 70 - 99 mg/dl Final    Performed on 08/04/2020 ACCU-CHEK   Final    WBC 08/04/2020 6.3  4.0 - 11.0 K/uL Final    RBC 08/04/2020 3.78* 4.00 - 5.20 M/uL Final    Hemoglobin 08/04/2020 10.4* 12.0 - 16.0 g/dL Final    Hematocrit 08/04/2020 34.0* 36.0 - 48.0 % Final    MCV 08/04/2020 89.9  80.0 - 100.0 fL Final    MCH 08/04/2020 27.4  26.0 - 34.0 pg Final    MCHC 08/04/2020 30.5* 31.0 - 36.0 g/dL Final    RDW 08/04/2020 15.0  12.4 - 15.4 % Final    Platelets 06/06/3228 183  135 - 450 K/uL Final    MPV 08/04/2020 9.6  5.0 - 10.5 fL Final    Neutrophils % 08/04/2020 59.9  % Final    Lymphocytes % 08/04/2020 32.4  % Final    Monocytes % 08/04/2020 6.7  % Final    Eosinophils % 08/04/2020 0.8  % Final    Basophils % 08/04/2020 0.2  % Final    Neutrophils Absolute 08/04/2020 3.8  1.7 - 7.7 K/uL Final    Lymphocytes Absolute 08/04/2020 2.0  1.0 - 5.1 K/uL Final    Monocytes Absolute 08/04/2020 0.4  0.0 - 1.3 K/uL Final    Eosinophils Absolute 08/04/2020 0.0  0.0 - 0.6 K/uL Final    Basophils Absolute 08/04/2020 0.0  0.0 - 0.2 K/uL Final    Sodium 08/04/2020 131* 136 - 145 mmol/L Final    Potassium reflex Magnesium 08/04/2020 4.4  3.5 - 5.1 mmol/L Final    Chloride 08/04/2020 96* 99 - 110 mmol/L Final    CO2 08/04/2020 20* 21 - 32 mmol/L Final    Anion Gap 08/04/2020 15  3 - 16 Final    Glucose 08/04/2020 662* 70 - 99 mg/dL Final    BUN 08/04/2020 17  7 - 20 mg/dL Final    CREATININE 08/04/2020 1.2  0.6 - 1.2 mg/dL Final    GFR Non- 08/04/2020 46* >60 Final    GFR  08/04/2020 55* >60 Final    Calcium 08/04/2020 9.2  8.3 - 10.6 mg/dL Final    Total Protein 08/04/2020 7.5  6.4 - 8.2 g/dL Final    Alb 08/04/2020 4.0  3.4 - 5.0 g/dL Final    Albumin/Globulin Ratio 08/04/2020 1.1  1.1 - 2.2 Final    Total Bilirubin 08/04/2020 <0.2  0.0 - 1.0 mg/dL Final    Alkaline Phosphatase 08/04/2020 201* 40 - 129 U/L Final    ALT 08/04/2020 43* 10 - 40 U/L Final    AST 08/04/2020 35  15 - 37 U/L Final    Globulin 08/04/2020 3.5  g/dL Final    Color, UA 08/04/2020 Yellow  Straw/Yellow Final    Clarity, UA 08/04/2020 Clear  Clear Final    Glucose, Ur 08/04/2020 >=1000* Negative mg/dL Final    Bilirubin Urine 08/04/2020 Negative  Negative Final    Ketones, Urine 08/04/2020 Negative  Negative mg/dL Final    Specific Gravity, UA 08/04/2020 1.010  1.005 - 1.030 Final    Blood, Urine 08/04/2020 Negative  Negative Final    pH, UA 08/04/2020 5.5  5.0 - 8.0 Final    Protein, UA 08/04/2020 Negative  Negative mg/dL Final    Urobilinogen, Urine 08/04/2020 0.2  <2.0 E.U./dL Final    Nitrite, Urine 08/04/2020 Negative  Negative Final    Leukocyte Esterase, Urine 08/04/2020 Negative  Negative Final    Microscopic Examination 08/04/2020 Not Indicated   Final    Urine Type 08/04/2020 NotGiven   Final    Urine Reflex to Culture 08/04/2020 Not Indicated   Final    pH, [facility] 08/04/2020 7.315* 7.350 - 7.450 Final    pCO2, [facility] 08/04/2020 36.0* 40.0 - 50.0 mmHg Final    pO2, [facility] 08/04/2020 81.4* 25.0 - 40.0 mmHg Final    HCO3, Venous 08/04/2020 17.9* 23.0 - 29.0 mmol/L Final    Base Excess, [facility] 08/04/2020 -7.5* -3.0 - 3.0 mmol/L Final    O2 Sat, [facility] 08/04/2020 95  Not Established % Final    Carboxyhemoglobin 08/04/2020 1.7* 0.0 - 1.5 % Final    MetHgb, [facility] 08/04/2020 0.6  <1.5 % Final    TC02 (Calc), [facility] 08/04/2020 19  Not Established mmol/L Final    O2 Content, [facility] 08/04/2020 15  Not Established VOL % Final    O2 Therapy 08/04/2020 Unknown   Final    Glucose 08/05/2020 379  mg/dL Final    POC Glucose 08/05/2020 520* 70 - 99 mg/dl Final    Performed on 08/05/2020 ACCU-CHEK   Final    POC Glucose 08/05/2020 379* 70 - 99 mg/dl Final    Performed on 08/05/2020 ACCU-CHEK   Final   Admission on 07/10/2020, Discharged on 07/11/2020   Component Date Value Ref Range Status    Troponin 07/10/2020 <0.01  <0.01 ng/mL Final    Troponin 07/11/2020 <0.01  <0.01 ng/mL Final    WBC 07/11/2020 4.7  4.0 - 11.0 K/uL Final    RBC 07/11/2020 3.65* 4.00 - 5.20 M/uL Final    Hemoglobin 07/11/2020 10.4* 12.0 - 16.0 g/dL Final    Hematocrit 07/11/2020 33.0* 36.0 - 48.0 % Final    MCV 07/11/2020 90.4  80.0 - 100.0 fL Final    MCH 07/11/2020 28.5  26.0 - 34.0 pg Final    MCHC 07/11/2020 31.5  31.0 - 36.0 g/dL Final    RDW 07/11/2020 14.1  12.4 - 15.4 % Final    Platelets 32/26/8114 172  135 - 450 K/uL Final    MPV 07/11/2020 9.9  5.0 - 10.5 fL Final    POC Glucose 07/10/2020 352* 70 - 99 mg/dl Final    Performed on 07/10/2020 ACCU-CHEK   Final    Ventricular Rate 07/11/2020 55  BPM Final    Atrial Rate 07/11/2020 55  BPM Final    P-R Interval 07/11/2020 156  ms Final    QRS Duration 07/11/2020 70  ms Final    Q-T Interval 07/11/2020 440  ms Final    QTc Calculation ([place]) 07/11/2020 420  ms Final    P Axis 07/11/2020 41  degrees Final    R Axis 07/11/2020 -14  degrees Final    T Axis 07/11/2020 54  degrees Final    Diagnosis 07/11/2020 Sinus bradycardiaOtherwise normal ECGWhen compared with ECG of 08-JUN-2020 12:22,No significant change was foundConfirmed by ALEXANDER Linton ([id number]) on 7/11/2020 10:12:54 AM   Final    POC Glucose 07/11/2020 299* 70 - 99 mg/dl Final    Performed on 07/11/2020 ACCU-CHEK   Final    POC Glucose 07/11/2020 152* 70 - 99 mg/dl Final    Performed on 07/11/2020 ACCU-CHEK   Final    POC Glucose 07/11/2020 293* 70 - 99 mg/dl Final    Performed on 07/11/2020 ACCU-CHEK   Final   Orders Only on 06/25/2020   Component Date Value Ref Range Status    Cholesterol, Total 06/25/2020 96  0 - 199 mg/dL Final    Triglycerides 06/25/2020 118  0 - 150 mg/dL Final    HDL 06/25/2020 39* 40 - 60 mg/dL Final    LDL Calculated 06/25/2020 33  <100 mg/dL Final    VLDL Cholesterol Calculated 06/25/2020 24  Not Established mg/dL Final    Sodium 06/25/2020 138  136 - 145 mmol/L Final    Potassium 06/25/2020 4.8  3.5 - 5.1 mmol/L Final    Chloride 06/25/2020 103  99 - 110 mmol/L Final    CO2 06/25/2020 19* 21 - 32 mmol/L Final    Anion Gap 06/25/2020 16  3 - 16 Final    Glucose 06/25/2020 271* 70 - 99 mg/dL Final    BUN 06/25/2020 20  7 - 20 mg/dL Final    CREATININE 06/25/2020 1.2  0.6 - 1.2 mg/dL Final    GFR Non-African American Monocytes Absolute 04/22/2020 0.3  0.0 - 1.3 K/uL Final    Eosinophils Absolute 04/22/2020 0.1  0.0 - 0.6 K/uL Final    Basophils Absolute 04/22/2020 0.0  0.0 - 0.2 K/uL Final    Sodium 04/22/2020 135* 136 - 145 mmol/L Final    Potassium 04/22/2020 4.4  3.5 - 5.1 mmol/L Final    Chloride 04/22/2020 96* 99 - 110 mmol/L Final    CO2 04/22/2020 24  21 - 32 mmol/L Final    Anion Gap 04/22/2020 15  3 - 16 Final    Glucose 04/22/2020 468* 70 - 99 mg/dL Final    BUN 04/22/2020 23* 7 - 20 mg/dL Final    CREATININE 04/22/2020 1.5* 0.6 - 1.2 mg/dL Final    GFR Non- 04/22/2020 35* >60 Final    GFR  04/22/2020 43* >60 Final    Calcium 04/22/2020 9.6  8.3 - 10.6 mg/dL Final    Total Protein 04/22/2020 8.0  6.4 - 8.2 g/dL Final    Alb 04/22/2020 4.3  3.4 - 5.0 g/dL Final    Albumin/Globulin Ratio 04/22/2020 1.2  1.1 - 2.2 Final    Total Bilirubin 04/22/2020 <0.2  0.0 - 1.0 mg/dL Final    Alkaline Phosphatase 04/22/2020 142* 40 - 129 U/L Final    ALT 04/22/2020 25  10 - 40 U/L Final    AST 04/22/2020 16  15 - 37 U/L Final    Globulin 04/22/2020 3.7  g/dL Final    Color, UA 04/22/2020 Straw  Straw/Yellow Final    Clarity, UA 04/22/2020 Clear  Clear Final    Glucose, Ur 04/22/2020 >=1000* Negative mg/dL Final    Bilirubin Urine 04/22/2020 Negative  Negative Final    Ketones, Urine 04/22/2020 Negative  Negative mg/dL Final    Specific Gravity, UA 04/22/2020 1.010  1.005 - 1.030 Final    Blood, Urine 04/22/2020 Negative  Negative Final    pH, UA 04/22/2020 5.5  5.0 - 8.0 Final    Protein, UA 04/22/2020 Negative  Negative mg/dL Final    Urobilinogen, Urine 04/22/2020 0.2  <2.0 E.U./dL Final    Nitrite, Urine 04/22/2020 Negative  Negative Final    Leukocyte Esterase, Urine 04/22/2020 Negative  Negative Final    Microscopic Examination 04/22/2020 Not Indicated   Final    Urine Type 04/22/2020 NotGiven   Final    pH, [place] 04/22/2020 7.258* 7.350 - 7.450 Final    pCO2, [place] M/uL Final    Hemoglobin 01/22/2020 11.3* 12.0 - 16.0 g/dL Final    Hematocrit 01/22/2020 35.6* 36.0 - 48.0 % Final    MCV 01/22/2020 86.4  80.0 - 100.0 fL Final    MCH 01/22/2020 27.4  26.0 - 34.0 pg Final    MCHC 01/22/2020 31.7  31.0 - 36.0 g/dL Final    RDW 01/22/2020 13.2  12.4 - 15.4 % Final    Platelets 92/48/8321 156  135 - 450 K/uL Final    MPV 01/22/2020 10.6* 5.0 - 10.5 fL Final    Neutrophils % 01/22/2020 65.1  % Final    Lymphocytes % 01/22/2020 28.9  % Final    Monocytes % 01/22/2020 5.2  % Final    Eosinophils % 01/22/2020 0.6  % Final    Basophils % 01/22/2020 0.2  % Final    Neutrophils Absolute 01/22/2020 3.9  1.7 - 7.7 K/uL Final    Lymphocytes Absolute 01/22/2020 1.7  1.0 - 5.1 K/uL Final    Monocytes Absolute 01/22/2020 0.3  0.0 - 1.3 K/uL Final    Eosinophils Absolute 01/22/2020 0.0  0.0 - 0.6 K/uL Final    Basophils Absolute 01/22/2020 0.0  0.0 - 0.2 K/uL Final    Sodium 01/22/2020 127* 136 - 145 mmol/L Final    Potassium 01/22/2020 4.2  3.5 - 5.1 mmol/L Final    Chloride 01/22/2020 89* 99 - 110 mmol/L Final    CO2 01/22/2020 25  21 - 32 mmol/L Final    Anion Gap 01/22/2020 13  3 - 16 Final    Glucose 01/22/2020 731* 70 - 99 mg/dL Final    BUN 01/22/2020 15  7 - 20 mg/dL Final    CREATININE 01/22/2020 1.3* 0.6 - 1.2 mg/dL Final    GFR Non- 01/22/2020 42* >60 Final    GFR  01/22/2020 50* >60 Final    Calcium 01/22/2020 8.9  8.3 - 10.6 mg/dL Final    Total Protein 01/22/2020 7.1  6.4 - 8.2 g/dL Final    Alb 01/22/2020 3.9  3.4 - 5.0 g/dL Final    Albumin/Globulin Ratio 01/22/2020 1.2  1.1 - 2.2 Final    Total Bilirubin 01/22/2020 <0.2  0.0 - 1.0 mg/dL Final    Alkaline Phosphatase 01/22/2020 158* 40 - 129 U/L Final    ALT 01/22/2020 37  10 - 40 U/L Final    AST 01/22/2020 30  15 - 37 U/L Final    Globulin 01/22/2020 3.2  g/dL Final    Ventricular Rate 01/22/2020 68  BPM Final    Atrial Rate 01/22/2020 68  BPM Final    P-R Established VOL % Final    O2 Therapy 01/22/2020 Unknown   Final    WBC, UA 01/22/2020 6-10* 0 - 5 /HPF Final    RBC, UA 01/22/2020 None seen  0 - 2 /HPF Final    Bacteria, UA 01/22/2020 1+* /HPF Final    POC Glucose 01/22/2020 529* 70 - 99 mg/dl Final    Performed on 01/22/2020 ACCU-CHEK   Final    Sodium 01/22/2020 126* 136 - 145 mmol/L Final    Potassium 01/22/2020 4.2  3.5 - 5.1 mmol/L Final    Chloride 01/22/2020 95* 99 - 110 mmol/L Final    CO2 01/22/2020 23  21 - 32 mmol/L Final    Anion Gap 01/22/2020 8  3 - 16 Final    Glucose 01/22/2020 607* 70 - 99 mg/dL Final    BUN 01/22/2020 15  7 - 20 mg/dL Final    CREATININE 01/22/2020 1.2  0.6 - 1.2 mg/dL Final    GFR Non- 01/22/2020 46* >60 Final    GFR  01/22/2020 55* >60 Final    Calcium 01/22/2020 8.3  8.3 - 10.6 mg/dL Final    Lactic Acid, Sepsis 01/22/2020 2.3* 0.4 - 1.9 mmol/L Final    Lactic Acid, Sepsis 01/23/2020 1.2  0.4 - 1.9 mmol/L Final    POC Glucose 01/22/2020 382* 70 - 99 mg/dl Final    Performed on 01/22/2020 ACCU-CHEK   Final    Sodium 01/23/2020 140  136 - 145 mmol/L Final    Potassium reflex Magnesium 01/23/2020 3.5  3.5 - 5.1 mmol/L Final    Chloride 01/23/2020 108  99 - 110 mmol/L Final    CO2 01/23/2020 23  21 - 32 mmol/L Final    Anion Gap 01/23/2020 9  3 - 16 Final    Glucose 01/23/2020 53* 70 - 99 mg/dL Final    BUN 01/23/2020 14  7 - 20 mg/dL Final    CREATININE 01/23/2020 1.1  0.6 - 1.2 mg/dL Final    GFR Non- 01/23/2020 51* >60 Final    GFR  01/23/2020 >60  >60 Final    Calcium 01/23/2020 8.4  8.3 - 10.6 mg/dL Final    Total Protein 01/23/2020 5.9* 6.4 - 8.2 g/dL Final    Alb 01/23/2020 3.1* 3.4 - 5.0 g/dL Final    Albumin/Globulin Ratio 01/23/2020 1.1  1.1 - 2.2 Final    Total Bilirubin 01/23/2020 <0.2  0.0 - 1.0 mg/dL Final    Alkaline Phosphatase 01/23/2020 126  40 - 129 U/L Final    ALT 01/23/2020 28  10 - 40 U/L Final    AST 01/23/2020 19  15 - 37 U/L Final    Globulin 01/23/2020 2.8  g/dL Final    WBC 01/23/2020 5.1  4.0 - 11.0 K/uL Final    RBC 01/23/2020 3.67* 4.00 - 5.20 M/uL Final    Hemoglobin 01/23/2020 10.1* 12.0 - 16.0 g/dL Final    Hematocrit 01/23/2020 31.0* 36.0 - 48.0 % Final    MCV 01/23/2020 84.5  80.0 - 100.0 fL Final    MCH 01/23/2020 27.6  26.0 - 34.0 pg Final    MCHC 01/23/2020 32.7  31.0 - 36.0 g/dL Final    RDW 01/23/2020 13.2  12.4 - 15.4 % Final    Platelets 84/51/1062 131* 135 - 450 K/uL Final    MPV 01/23/2020 10.5  5.0 - 10.5 fL Final    Neutrophils % 01/23/2020 39.3  % Final    Lymphocytes % 01/23/2020 50.7  % Final    Monocytes % 01/23/2020 7.7  % Final    Eosinophils % 01/23/2020 1.4  % Final    Basophils % 01/23/2020 0.9  % Final    Neutrophils Absolute 01/23/2020 2.0  1.7 - 7.7 K/uL Final    Lymphocytes Absolute 01/23/2020 2.6  1.0 - 5.1 K/uL Final    Monocytes Absolute 01/23/2020 0.4  0.0 - 1.3 K/uL Final    Eosinophils Absolute 01/23/2020 0.1  0.0 - 0.6 K/uL Final    Basophils Absolute 01/23/2020 0.0  0.0 - 0.2 K/uL Final    Organism 01/23/2020 Escherichia coli*  Final    Urine Culture, Routine 01/23/2020 >100,000 CFU/ml   Final    POC Glucose 01/23/2020 131* 70 - 99 mg/dl Final    Performed on 01/23/2020 ACCU-CHEK   Final    POC Glucose 01/23/2020 76  70 - 99 mg/dl Final    Performed on 01/23/2020 ACCU-CHEK   Final    POC Glucose 01/23/2020 108* 70 - 99 mg/dl Final    Performed on 01/23/2020 ACCU-CHEK   Final    Magnesium 01/23/2020 1.60* 1.80 - 2.40 mg/dL Final    POC Glucose 01/23/2020 362* 70 - 99 mg/dl Final    Performed on 01/23/2020 ACCU-CHEK   Final    POC Glucose 01/23/2020 228* 70 - 99 mg/dl Final    Performed on 01/23/2020 ACCU-CHEK   Final   Orders Only on 12/20/2019   Component Date Value Ref Range Status    Hemoglobin A1C 12/20/2019 8.4  See comment % Final    eAG 12/20/2019 194.4  mg/dL Final           Assessment and Plan     Christina Jaramillo was seen today for diabetes. Diagnoses and all orders for this visit:    Uncontrolled type 2 diabetes mellitus with microalbuminuria, with long-term current use of insulin (HCC)  -     TSH without Reflex; Future  -     T4, Free; Future  -     T3, Free; Future  -     Cortisol AM, Total; Future  -     CEA; Future  -     CANCER ANTIGEN 19-9; Future    Uncontrolled type 2 diabetes mellitus with diabetic nephropathy, with long-term current use of insulin (HCC)  -     TSH without Reflex; Future  -     T4, Free; Future  -     T3, Free; Future  -     Cortisol AM, Total; Future  -     CEA; Future  -     CANCER ANTIGEN 19-9; Future    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 (HCC)  -     TSH without Reflex; Future  -     T4, Free; Future  -     T3, Free; Future  -     Cortisol AM, Total; Future  -     CEA; Future  -     CANCER ANTIGEN 19-9; Future    Dyslipidemia associated with type 2 diabetes mellitus (HCC)  -     TSH without Reflex; Future  -     T4, Free; Future  -     T3, Free; Future  -     Cortisol AM, Total; Future  -     CEA; Future  -     CANCER ANTIGEN 19-9; Future    Diabetic polyneuropathy associated with type 2 diabetes mellitus (HCC)  -     TSH without Reflex; Future  -     T4, Free; Future  -     T3, Free; Future  -     Cortisol AM, Total; Future  -     CEA; Future  -     CANCER ANTIGEN 19-9; Future    Essential hypertension  -     TSH without Reflex; Future  -     T4, Free; Future  -     T3, Free; Future  -     Cortisol AM, Total; Future  -     CEA; Future  -     CANCER ANTIGEN 19-9; Future    Microalbuminuria  -     TSH without Reflex; Future  -     T4, Free; Future  -     T3, Free; Future  -     Cortisol AM, Total; Future  -     CEA; Future  -     CANCER ANTIGEN 19-9; Future    Unintentional weight loss  -     TSH without Reflex; Future  -     T4, Free; Future  -     T3, Free; Future  -     Cortisol AM, Total; Future  -     CEA; Future  -     CANCER ANTIGEN 19-9;  Future          1: Type 2 DM complicated with reviewed with the patient. Diabetes health maintenance plan and follow-up were discussed and understood by the patient. We reviewed the importance of medication compliance and regular follow-up. Aggressive lifestyle modification was encouraged. Exercise Counselling: This patient is a candidate for regular physical exercise. Instructions to perform the following types of exercise:  Swimming or water aerobic exercise  Brisk walking  Playing tennis  Stationary bicycle or elliptical indoor  Low impact aerobic exercise    Instructions given to exercise for the following duration:  30 minutes a day for five-seven days per week.     Following instructions for being active throughout the day in addition to formal exercise:  Walk instead of drive whenever possible  Take the stairs instead of the elevator  Work in the garden  Park to the far end of the parking lot to add more walking steps to destination      Electronically signed by Almaz Betancourt MD on 11/5/2020 at 2:52 PM

## 2020-11-09 ENCOUNTER — HOSPITAL ENCOUNTER (OUTPATIENT)
Age: 60
Discharge: HOME | End: 2020-11-09
Payer: MEDICAID

## 2020-11-09 LAB
CEA: 12.6 NG/ML (ref 0–5)
CORTISOL - AM: 24.3 UG/DL (ref 4.3–22.4)
T3 FREE: 1.4 PG/ML (ref 2.3–4.2)
T4 FREE: 1.1 NG/DL (ref 0.9–1.8)
TSH SERPL DL<=0.05 MIU/L-ACNC: 0.92 UIU/ML (ref 0.27–4.2)

## 2020-11-09 PROCEDURE — 84443 ASSAY THYROID STIM HORMONE: CPT

## 2020-11-09 PROCEDURE — 82378 CARCINOEMBRYONIC ANTIGEN: CPT

## 2020-11-09 PROCEDURE — 84439 ASSAY OF FREE THYROXINE: CPT

## 2020-11-09 PROCEDURE — 82533 TOTAL CORTISOL: CPT

## 2020-11-09 PROCEDURE — 86301 IMMUNOASSAY TUMOR CA 19-9: CPT

## 2020-11-09 PROCEDURE — 84481 FREE ASSAY (FT-3): CPT

## 2020-11-09 PROCEDURE — 36415 COLL VENOUS BLD VENIPUNCTURE: CPT

## 2020-11-11 ENCOUNTER — APPOINTMENT (OUTPATIENT)
Dept: CT IMAGING | Age: 60
End: 2020-11-11
Payer: MEDICAID

## 2020-11-11 ENCOUNTER — APPOINTMENT (OUTPATIENT)
Dept: GENERAL RADIOLOGY | Age: 60
End: 2020-11-11
Payer: MEDICAID

## 2020-11-11 ENCOUNTER — HOSPITAL ENCOUNTER (EMERGENCY)
Age: 60
Discharge: HOME | End: 2020-11-12
Payer: MEDICAID

## 2020-11-11 LAB
A/G RATIO: 0.7 (ref 1.1–2.2)
ALBUMIN SERPL-MCNC: 3.3 G/DL (ref 3.4–5)
ALP BLD-CCNC: 201 U/L (ref 40–129)
ALT SERPL-CCNC: 28 U/L (ref 10–40)
ANION GAP SERPL CALCULATED.3IONS-SCNC: 15 MMOL/L (ref 3–16)
AST SERPL-CCNC: 18 U/L (ref 15–37)
BASE EXCESS VENOUS: -0.7 MMOL/L (ref -3–3)
BASOPHILS ABSOLUTE: 0 K/UL (ref 0–0.2)
BASOPHILS RELATIVE PERCENT: 0.4 %
BILIRUB SERPL-MCNC: 0.4 MG/DL (ref 0–1)
BUN BLDV-MCNC: 16 MG/DL (ref 7–20)
CA 19-9: 58 U/ML (ref 0–35)
CALCIUM SERPL-MCNC: 9.8 MG/DL (ref 8.3–10.6)
CARBOXYHEMOGLOBIN: 1.6 % (ref 0–1.5)
CHLORIDE BLD-SCNC: 97 MMOL/L (ref 99–110)
CO2: 22 MMOL/L (ref 21–32)
CREAT SERPL-MCNC: 1.2 MG/DL (ref 0.6–1.2)
EOSINOPHILS ABSOLUTE: 0 K/UL (ref 0–0.6)
EOSINOPHILS RELATIVE PERCENT: 0.4 %
GFR AFRICAN AMERICAN: 55
GFR NON-AFRICAN AMERICAN: 46
GLOBULIN: 4.6 G/DL
GLUCOSE BLD-MCNC: 377 MG/DL (ref 70–99)
GLUCOSE BLD-MCNC: 434 MG/DL (ref 70–99)
HCO3 VENOUS: 23.1 MMOL/L (ref 23–29)
HCT VFR BLD CALC: 33.8 % (ref 36–48)
HEMOGLOBIN: 10.4 G/DL (ref 12–16)
LYMPHOCYTES ABSOLUTE: 1.7 K/UL (ref 1–5.1)
LYMPHOCYTES RELATIVE PERCENT: 22.6 %
MCH RBC QN AUTO: 28.5 PG (ref 26–34)
MCHC RBC AUTO-ENTMCNC: 30.9 G/DL (ref 31–36)
MCV RBC AUTO: 92.4 FL (ref 80–100)
METHEMOGLOBIN VENOUS: 0 %
MONOCYTES ABSOLUTE: 0.6 K/UL (ref 0–1.3)
MONOCYTES RELATIVE PERCENT: 8 %
NEUTROPHILS ABSOLUTE: 5.2 K/UL (ref 1.7–7.7)
NEUTROPHILS RELATIVE PERCENT: 68.6 %
O2 CONTENT, VEN: 16 VOL %
O2 SAT, VEN: 98 %
O2 THERAPY: ABNORMAL
PCO2, VEN: 35.5 MMHG (ref 40–50)
PDW BLD-RTO: 13.7 % (ref 12.4–15.4)
PERFORMED ON: ABNORMAL
PH VENOUS: 7.43 (ref 7.35–7.45)
PLATELET # BLD: 220 K/UL (ref 135–450)
PMV BLD AUTO: 10 FL (ref 5–10.5)
PO2, VEN: 177.3 MMHG (ref 25–40)
POTASSIUM REFLEX MAGNESIUM: 3.9 MMOL/L (ref 3.5–5.1)
RAPID INFLUENZA  B AGN: NEGATIVE
RAPID INFLUENZA A AGN: NEGATIVE
RBC # BLD: 3.65 M/UL (ref 4–5.2)
S PYO AG THROAT QL: NEGATIVE
SODIUM BLD-SCNC: 134 MMOL/L (ref 136–145)
TCO2 CALC VENOUS: 24 MMOL/L
TOTAL PROTEIN: 7.9 G/DL (ref 6.4–8.2)
WBC # BLD: 7.6 K/UL (ref 4–11)

## 2020-11-11 PROCEDURE — 80053 COMPREHEN METABOLIC PANEL: CPT

## 2020-11-11 PROCEDURE — 70487 CT MAXILLOFACIAL W/DYE: CPT

## 2020-11-11 PROCEDURE — 87804 INFLUENZA ASSAY W/OPTIC: CPT

## 2020-11-11 PROCEDURE — U0002 COVID-19 LAB TEST NON-CDC: HCPCS

## 2020-11-11 PROCEDURE — 6370000000 HC RX 637 (ALT 250 FOR IP): Performed by: PHYSICIAN ASSISTANT

## 2020-11-11 PROCEDURE — 81001 URINALYSIS AUTO W/SCOPE: CPT

## 2020-11-11 PROCEDURE — 82803 BLOOD GASES ANY COMBINATION: CPT

## 2020-11-11 PROCEDURE — 2580000003 HC RX 258: Performed by: PHYSICIAN ASSISTANT

## 2020-11-11 PROCEDURE — 99284 EMERGENCY DEPT VISIT MOD MDM: CPT

## 2020-11-11 PROCEDURE — 85025 COMPLETE CBC W/AUTO DIFF WBC: CPT

## 2020-11-11 PROCEDURE — 87081 CULTURE SCREEN ONLY: CPT

## 2020-11-11 PROCEDURE — 96374 THER/PROPH/DIAG INJ IV PUSH: CPT

## 2020-11-11 PROCEDURE — 6360000004 HC RX CONTRAST MEDICATION: Performed by: PHYSICIAN ASSISTANT

## 2020-11-11 PROCEDURE — 87880 STREP A ASSAY W/OPTIC: CPT

## 2020-11-11 PROCEDURE — 71045 X-RAY EXAM CHEST 1 VIEW: CPT

## 2020-11-11 PROCEDURE — 96375 TX/PRO/DX INJ NEW DRUG ADDON: CPT

## 2020-11-11 RX ORDER — GUAIFENESIN 600 MG/1
600 TABLET, EXTENDED RELEASE ORAL 2 TIMES DAILY
Qty: 30 TABLET | Refills: 0 | Status: SHIPPED | OUTPATIENT
Start: 2020-11-11 | End: 2020-11-26

## 2020-11-11 RX ORDER — DOXYCYCLINE HYCLATE 100 MG
100 TABLET ORAL 2 TIMES DAILY
Qty: 20 TABLET | Refills: 0 | Status: SHIPPED | OUTPATIENT
Start: 2020-11-11 | End: 2020-11-21

## 2020-11-11 RX ORDER — 0.9 % SODIUM CHLORIDE 0.9 %
1000 INTRAVENOUS SOLUTION INTRAVENOUS ONCE
Status: COMPLETED | OUTPATIENT
Start: 2020-11-12 | End: 2020-11-12

## 2020-11-11 RX ORDER — FLUTICASONE PROPIONATE 50 MCG
2 SPRAY, SUSPENSION (ML) NASAL DAILY
Qty: 1 BOTTLE | Refills: 0 | Status: ON HOLD | OUTPATIENT
Start: 2020-11-11 | End: 2021-01-26

## 2020-11-11 RX ORDER — DOXYCYCLINE HYCLATE 100 MG
100 TABLET ORAL ONCE
Status: COMPLETED | OUTPATIENT
Start: 2020-11-12 | End: 2020-11-11

## 2020-11-11 RX ADMIN — DOXYCYCLINE HYCLATE 100 MG: 100 TABLET, COATED ORAL at 23:55

## 2020-11-11 RX ADMIN — SODIUM CHLORIDE 1000 ML: 9 INJECTION, SOLUTION INTRAVENOUS at 23:55

## 2020-11-11 RX ADMIN — INSULIN HUMAN 5 UNITS: 100 INJECTION, SOLUTION PARENTERAL at 23:54

## 2020-11-11 RX ADMIN — IOPAMIDOL 80 ML: 755 INJECTION, SOLUTION INTRAVENOUS at 23:24

## 2020-11-11 ASSESSMENT — PAIN DESCRIPTION - LOCATION: LOCATION: FACE

## 2020-11-11 ASSESSMENT — PAIN SCALES - GENERAL: PAINLEVEL_OUTOF10: 10

## 2020-11-11 ASSESSMENT — PAIN DESCRIPTION - PAIN TYPE: TYPE: ACUTE PAIN

## 2020-11-11 NOTE — RESULT ENCOUNTER NOTE
Two markers, CEA and CA-19 are slightly high. These could be elevated in tumors or inflammation. Recommend seeing gastroenterologist   I am sending a referral at your home address.    Thanks,  Lieutenant Chang MD

## 2020-11-12 VITALS
RESPIRATION RATE: 16 BRPM | WEIGHT: 143 LBS | BODY MASS INDEX: 25.34 KG/M2 | DIASTOLIC BLOOD PRESSURE: 67 MMHG | HEIGHT: 63 IN | SYSTOLIC BLOOD PRESSURE: 144 MMHG | OXYGEN SATURATION: 100 % | TEMPERATURE: 98 F | HEART RATE: 75 BPM

## 2020-11-12 LAB
BACTERIA: ABNORMAL /HPF
BILIRUBIN URINE: NEGATIVE
BLOOD, URINE: NEGATIVE
CLARITY: ABNORMAL
COLOR: ABNORMAL
EPITHELIAL CELLS, UA: ABNORMAL /HPF (ref 0–5)
GLUCOSE BLD-MCNC: 162 MG/DL
GLUCOSE BLD-MCNC: 162 MG/DL (ref 70–99)
GLUCOSE URINE: >=1000 MG/DL
KETONES, URINE: ABNORMAL MG/DL
LEUKOCYTE ESTERASE, URINE: NEGATIVE
MICROSCOPIC EXAMINATION: YES
NITRITE, URINE: POSITIVE
PERFORMED ON: ABNORMAL
PH UA: 5.5 (ref 5–8)
PROTEIN UA: NEGATIVE MG/DL
RBC UA: ABNORMAL /HPF (ref 0–4)
SARS-COV-2, NAAT: NOT DETECTED
SPECIFIC GRAVITY UA: <=1.005 (ref 1–1.03)
URINE TYPE: ABNORMAL
UROBILINOGEN, URINE: 0.2 E.U./DL
WBC UA: ABNORMAL /HPF (ref 0–5)

## 2020-11-12 NOTE — ED NOTES
Patient resting in bed IV infusing at this time Patient has rails up x2, warm blanket applied/      Galen Leonardo RN  11/12/20 [id number]

## 2020-11-12 NOTE — ED NOTES
--Patient provided with discharge instructions. --Instructions, and follow-up appointments reviewed with patient/family. No further questions or needs at this time. --Vital signs and patient stable upon discharge. --[facility] to [facility].          [facility]  11/12/20 0149

## 2020-11-14 LAB — S PYO THROAT QL CULT: NORMAL

## 2020-11-16 ENCOUNTER — TELEPHONE (OUTPATIENT)
Dept: EMERGENCY DEPT | Age: 60
End: 2020-11-16

## 2020-11-16 PROCEDURE — 99284 EMERGENCY DEPT VISIT MOD MDM: CPT

## 2020-11-16 PROCEDURE — 96374 THER/PROPH/DIAG INJ IV PUSH: CPT

## 2020-11-16 ASSESSMENT — PAIN SCALES - GENERAL: PAINLEVEL_OUTOF10: 9

## 2020-11-16 ASSESSMENT — PAIN DESCRIPTION - LOCATION: LOCATION: HEAD

## 2020-11-17 ENCOUNTER — HOSPITAL ENCOUNTER (EMERGENCY)
Age: 60
Discharge: HOME | End: 2020-11-17
Attending: EMERGENCY MEDICINE
Payer: MEDICAID

## 2020-11-17 VITALS
OXYGEN SATURATION: 99 % | HEART RATE: 86 BPM | TEMPERATURE: 98.8 F | DIASTOLIC BLOOD PRESSURE: 57 MMHG | SYSTOLIC BLOOD PRESSURE: 113 MMHG | BODY MASS INDEX: 25.34 KG/M2 | WEIGHT: 143 LBS | RESPIRATION RATE: 16 BRPM | HEIGHT: 63 IN

## 2020-11-17 LAB
A/G RATIO: 1.1 (ref 1.1–2.2)
ALBUMIN SERPL-MCNC: 3.9 G/DL (ref 3.4–5)
ALP BLD-CCNC: 314 U/L (ref 40–129)
ALT SERPL-CCNC: 42 U/L (ref 10–40)
ANION GAP SERPL CALCULATED.3IONS-SCNC: 11 MMOL/L (ref 3–16)
AST SERPL-CCNC: 54 U/L (ref 15–37)
BASE EXCESS VENOUS: -3.9 MMOL/L (ref -3–3)
BASOPHILS ABSOLUTE: 0 K/UL (ref 0–0.2)
BASOPHILS RELATIVE PERCENT: 0.7 %
BILIRUB SERPL-MCNC: <0.2 MG/DL (ref 0–1)
BILIRUBIN URINE: NEGATIVE
BLOOD, URINE: NEGATIVE
BUN BLDV-MCNC: 11 MG/DL (ref 7–20)
CALCIUM SERPL-MCNC: 9 MG/DL (ref 8.3–10.6)
CARBOXYHEMOGLOBIN: 4.1 % (ref 0–1.5)
CHLORIDE BLD-SCNC: 95 MMOL/L (ref 99–110)
CLARITY: CLEAR
CO2: 23 MMOL/L (ref 21–32)
COLOR: YELLOW
CREAT SERPL-MCNC: 1.4 MG/DL (ref 0.6–1.2)
EOSINOPHILS ABSOLUTE: 0 K/UL (ref 0–0.6)
EOSINOPHILS RELATIVE PERCENT: 0.5 %
GFR AFRICAN AMERICAN: 46
GFR NON-AFRICAN AMERICAN: 38
GLOBULIN: 3.4 G/DL
GLUCOSE BLD-MCNC: 342 MG/DL
GLUCOSE BLD-MCNC: 342 MG/DL (ref 70–99)
GLUCOSE BLD-MCNC: 440 MG/DL (ref 70–99)
GLUCOSE BLD-MCNC: 700 MG/DL (ref 70–99)
GLUCOSE BLD-MCNC: >600 MG/DL (ref 70–99)
GLUCOSE URINE: >=1000 MG/DL
HCO3 VENOUS: 22.1 MMOL/L (ref 23–29)
HCT VFR BLD CALC: 29 % (ref 36–48)
HEMOGLOBIN: 8.8 G/DL (ref 12–16)
KETONES, URINE: NEGATIVE MG/DL
LEUKOCYTE ESTERASE, URINE: NEGATIVE
LYMPHOCYTES ABSOLUTE: 1.3 K/UL (ref 1–5.1)
LYMPHOCYTES RELATIVE PERCENT: 22.1 %
MCH RBC QN AUTO: 29.2 PG (ref 26–34)
MCHC RBC AUTO-ENTMCNC: 30.4 G/DL (ref 31–36)
MCV RBC AUTO: 95.8 FL (ref 80–100)
METHEMOGLOBIN VENOUS: 0.2 %
MICROSCOPIC EXAMINATION: ABNORMAL
MONOCYTES ABSOLUTE: 0.3 K/UL (ref 0–1.3)
MONOCYTES RELATIVE PERCENT: 4.7 %
NEUTROPHILS ABSOLUTE: 4.2 K/UL (ref 1.7–7.7)
NEUTROPHILS RELATIVE PERCENT: 72 %
NITRITE, URINE: NEGATIVE
O2 CONTENT, VEN: 11 VOL %
O2 SAT, VEN: 82 %
O2 THERAPY: ABNORMAL
PCO2, VEN: 44.5 MMHG (ref 40–50)
PDW BLD-RTO: 14.4 % (ref 12.4–15.4)
PERFORMED ON: ABNORMAL
PH UA: 5.5 (ref 5–8)
PH VENOUS: 7.31 (ref 7.35–7.45)
PLATELET # BLD: 258 K/UL (ref 135–450)
PMV BLD AUTO: 9.6 FL (ref 5–10.5)
PO2, VEN: 47.4 MMHG (ref 25–40)
POTASSIUM SERPL-SCNC: 4.5 MMOL/L (ref 3.5–5.1)
PROTEIN UA: NEGATIVE MG/DL
RBC # BLD: 3.03 M/UL (ref 4–5.2)
SODIUM BLD-SCNC: 129 MMOL/L (ref 136–145)
SPECIFIC GRAVITY UA: 1.01 (ref 1–1.03)
TCO2 CALC VENOUS: 24 MMOL/L
TOTAL PROTEIN: 7.3 G/DL (ref 6.4–8.2)
URINE TYPE: ABNORMAL
UROBILINOGEN, URINE: 0.2 E.U./DL
WBC # BLD: 5.8 K/UL (ref 4–11)

## 2020-11-17 PROCEDURE — 85025 COMPLETE CBC W/AUTO DIFF WBC: CPT

## 2020-11-17 PROCEDURE — 2580000003 HC RX 258: Performed by: PHYSICIAN ASSISTANT

## 2020-11-17 PROCEDURE — 82803 BLOOD GASES ANY COMBINATION: CPT

## 2020-11-17 PROCEDURE — 81003 URINALYSIS AUTO W/O SCOPE: CPT

## 2020-11-17 PROCEDURE — 96361 HYDRATE IV INFUSION ADD-ON: CPT

## 2020-11-17 PROCEDURE — 80053 COMPREHEN METABOLIC PANEL: CPT

## 2020-11-17 PROCEDURE — 6370000000 HC RX 637 (ALT 250 FOR IP): Performed by: PHYSICIAN ASSISTANT

## 2020-11-17 RX ORDER — 0.9 % SODIUM CHLORIDE 0.9 %
1000 INTRAVENOUS SOLUTION INTRAVENOUS ONCE
Status: COMPLETED | OUTPATIENT
Start: 2020-11-17 | End: 2020-11-17

## 2020-11-17 RX ORDER — BUTALBITAL, ASPIRIN, AND CAFFEINE 325; 50; 40 MG/1; MG/1; MG/1
1 CAPSULE ORAL EVERY 4 HOURS PRN
Qty: 15 CAPSULE | Refills: 0 | Status: ON HOLD | OUTPATIENT
Start: 2020-11-17 | End: 2021-01-26

## 2020-11-17 RX ORDER — DIPHENHYDRAMINE HCL 25 MG
25 TABLET ORAL ONCE
Status: COMPLETED | OUTPATIENT
Start: 2020-11-17 | End: 2020-11-17

## 2020-11-17 RX ORDER — KETOROLAC TROMETHAMINE 10 MG/1
10 TABLET, FILM COATED ORAL ONCE
Status: COMPLETED | OUTPATIENT
Start: 2020-11-17 | End: 2020-11-17

## 2020-11-17 RX ORDER — BUTALBITAL, ACETAMINOPHEN AND CAFFEINE 50; 325; 40 MG/1; MG/1; MG/1
1 TABLET ORAL ONCE
Status: COMPLETED | OUTPATIENT
Start: 2020-11-17 | End: 2020-11-17

## 2020-11-17 RX ORDER — METOCLOPRAMIDE 10 MG/1
10 TABLET ORAL ONCE
Status: COMPLETED | OUTPATIENT
Start: 2020-11-17 | End: 2020-11-17

## 2020-11-17 RX ADMIN — SODIUM CHLORIDE 1000 ML: 9 INJECTION, SOLUTION INTRAVENOUS at 01:41

## 2020-11-17 RX ADMIN — KETOROLAC TROMETHAMINE 10 MG: 10 TABLET, FILM COATED ORAL at 01:11

## 2020-11-17 RX ADMIN — METOCLOPRAMIDE 10 MG: 10 TABLET ORAL at 01:11

## 2020-11-17 RX ADMIN — DIPHENHYDRAMINE HCL 25 MG: 25 TABLET ORAL at 01:11

## 2020-11-17 RX ADMIN — INSULIN HUMAN 10 UNITS: 100 INJECTION, SOLUTION PARENTERAL at 02:41

## 2020-11-17 RX ADMIN — BUTALBITAL, ACETAMINOPHEN, AND CAFFEINE 1 TABLET: 50; 325; 40 TABLET ORAL at 01:11

## 2020-11-17 ASSESSMENT — PAIN SCALES - GENERAL
PAINLEVEL_OUTOF10: 9
PAINLEVEL_OUTOF10: 0

## 2020-11-17 NOTE — ED PROVIDER NOTES
[facility] Emergency Department    CHIEF COMPLAINT  Headache (pt reports was seen a few days ago and diagnosed with sinus infection, reports it hasnt got any better with medication. )      SHARED SERVICE VISIT  I have seen and evaluated this patient with my supervising physician, Dr. Clearance Severin. HISTORY OF PRESENT ILLNESS  Brendan Davenport is a 63 y.o. female who presents to the ED complaining of ongoing facial pressure and nasal congestion. Recently diagnosed with sinusitis as well as urinary tract infection. Placed on doxycycline. Patient reports pressure in face. Mild headache without dizziness or confusion. Reports that blood sugars may be elevated she does feel little shaky. She denies any chest pain or shortness of breath. No cough or congestion. Non-smoker. She denies any fevers chills. No sore throat. Pressure in ears. Denies neck or back pain. No nausea or vomiting. No diarrhea constipation. Patient reports using Flonase currently in addition to antibiotics. No other complaints, modifying factors or associated symptoms. Nursing notes reviewed. Past Medical History:   Diagnosis Date    Abnormal brain MRI 7/20/2017    Partially empty sella and minimal chronic small vessel ischemic disease    Acute bilateral low back pain without sciatica 11/2/2016    SHARRON (acute kidney injury) ([address] 7/5/2017    Arthritis     back    Bipolar disorder ([address] 10/18/2008    CAD (coronary artery disease)     stent placed 6/8/20    Cancer [facility]) 2015    bilateral breast:s/p lumpectomy/radiation:under care care of breast specialist:Dr. Boone     Carotid stenosis, bilateral:<50%:per US 7/2016 7/15/2016    Carpal tunnel syndrome 10/18/2008    Cervical cancer screening 2014    Nml per pt'.     Coronary artery disease of native artery of native heart with stable angina pectoris ([address] 6/9/2020    DDD (degenerative disc disease), lumbar 7/18/2018    Depression     under care of pschiatrist:Dr. 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     Scoliosis     Spondylosis of lumbar region without myelopathy or radiculopathy 3/10/2017    Transient cerebral ischemia 07/15/2016    TIA:7/10/16    Unspecified cerebral artery occlusion with cerebral infarction     TIA     Past Surgical History:   Procedure Laterality Date    BREAST LUMPECTOMY  2015    Bilateral:breast cancer    CARDIAC CATHETERIZATION  06/08/2020    Dr. Precious Hollis), DAYANA to Diag 1    HYSTERECTOMY      Benign:no cervical cancer per pt'    KIDNEY REMOVAL      right    OTHER SURGICAL HISTORY Right     orif right ankle    TUBAL LIGATION       Family History   Problem Relation Age of Onset    Cancer Mother         breast    Cancer Father     Heart Failure Neg Hx     High Cholesterol Neg Hx     Hypertension Neg Hx     Migraines Neg Hx     Rashes/Skin Problems Neg Hx     Seizures Neg Hx     Stroke Neg Hx     Thyroid Disease Neg Hx     Diabetes Neg Hx      Social History     Socioeconomic History    Marital status:      Spouse name: Not on file    Number of children: Not on file    Years of education: Not on file    Highest education level: Not on file   Occupational History    Occupation:    Social Needs    Financial resource strain: Not on file    Food insecurity     Worry: Not on file     Inability: Not on file   [place] needs     Medical: Not on file     Non-medical: Not on file   Tobacco Use    Smoking status: Former Smoker     Packs/day: 0.50     Years: 20.00     Pack years: 10.00     Types: Cigarettes, Cigars     Last attempt to quit: 7/3/2014     Years since quittin.3    Smokeless tobacco: Never Used   Substance and Sexual Activity    Alcohol use: No     Alcohol/week: 0.0 standard drinks    Drug use: No    Sexual activity: Never   Lifestyle    Physical activity     Days per week: Not on file     Minutes per session: Not on file    Stress: Not on file   Relationships    Social connections     Talks on phone: Not on file     Gets together: Not on file     Attends Jain service: Not on file     Active member of club or organization: Not on file     Attends meetings of clubs or organizations: Not on file     Relationship status: Not on file    Intimate partner violence     Fear of current or ex partner: Not on file     Emotionally abused: Not on file     Physically abused: Not on file     Forced sexual activity: Not on file   Other Topics Concern    Not on file   Social History Narrative    Not on file     Current Facility-Administered Medications   Medication Dose Route Frequency Provider Last Rate Last Dose    diphenhydrAMINE (BENADRYL) tablet 25 mg  25 mg Oral Once Jorge Arreola        metoclopramide (REGLAN) tablet 10 mg  10 mg Oral Once Jorge Arreola        ketorolac (TORADOL) tablet 10 mg  10 mg Oral Once RANGEL Arreola        butalbital-acetaminophen-caffeine (FIORICET, [facility]) per tablet 1 tablet  1 tablet Oral Once Jorge Arreola         Current Outpatient Medications   Medication Sig Dispense Refill    doxycycline hyclate (VIBRA-TABS) 100 MG tablet Take 1 tablet by mouth 2 times daily for 10 days 20 tablet 0    guaiFENesin (MUCINEX) 600 MG extended release tablet Take 1 tablet by mouth 2 times daily for 15 days 30 tablet 0    fluticasone (FLONASE) 50 MCG/ACT nasal spray 2 sprays by Each Nostril route daily 1 Bottle 0    insulin glargine (LANTUS SOLOSTAR) 100 UNIT/ML injection pen Inject 25 Units into the skin every morning 1 pen 1    Liraglutide (VICTOZA) 18 MG/3ML SOPN SC injection Inject 1.8 mg into the skin daily (Patient not taking: Reported on 11/5/2020) 3 pen 5    blood glucose test strips (TRUE METRIX BLOOD GLUCOSE TEST) strip USE AS DIRECTED TO TEST 4 TIMES A  strip 5    pantoprazole (PROTONIX) 40 MG tablet Take 1 tablet by mouth every morning (before breakfast) 30 tablet 3    nitroGLYCERIN (NITROSTAT) 0.4 MG SL tablet up to max of 3 total doses. If no relief after 1 dose, call 911. 25 tablet 3    Insulin Pen Needle (UNIFINE PENTIPS) 32G X 4 MM MISC USE AS DIRECTED 3 TIMES A  each 6    ticagrelor (BRILINTA) 90 MG TABS tablet Take 1 tablet by mouth 2 times daily 60 tablet 11    simvastatin (ZOCOR) 20 MG tablet Take 20 mg by mouth nightly      cetirizine (ZYRTEC) 10 MG tablet Take 10 mg by mouth daily      lisinopril (PRINIVIL;ZESTRIL) 10 MG tablet Take 10 mg by mouth daily      Empagliflozin-metFORMIN HCl 12.5-1000 MG TABS Take 2 tablets by mouth daily 60 tablet 5    Blood Pressure Monitor GINA Check BP at home once or twice a day 1 Device 0    Insulin Syringe-Needle U-100 31G X 5/16\" 0.3 ML MISC USE 3 TIMES A DAY BEFORE MEALS 90 each 7    calcium carbonate-vitamin D (CALTRATE) 600-400 MG-UNIT TABS per tab       letrozole (FEMARA) 2.5 MG tablet TAKE 1 TABLET BY MOUTH EVERY DAY  3    glucagon 1 MG injection Inject 1 mg into the muscle See Admin Instructions Follow package directions for low blood sugar. 1 kit 0    clonazePAM (KLONOPIN) 1 MG tablet Take 1 mg by mouth as needed.  Elder Rued paliperidone (INVEGA) 9 MG extended release tablet Take 9 mg by mouth every morning       aspirin 81 MG tablet Take 81 mg by mouth daily      gabapentin (NEURONTIN) 600 MG tablet Take 600 mg by mouth 3 times daily      oxyCODONE-acetaminophen (PERCOCET) 5-325 MG per tablet Take 1 tablet by mouth every 6 hours as needed for Pain. Rebecca Ambrosia traZODone (DESYREL) 100 MG tablet Take 100 mg by mouth nightly       Allergies   Allergen Reactions    Morphine Anaphylaxis and Hives     feels like throat is closing    Penicillins Hives and Swelling    Codeine Hives and Rash    Penicillin G Rash       REVIEW OF SYSTEMS  10 systems reviewed, pertinent positives per HPI otherwise noted to be negative    PHYSICAL EXAM  BP (!) 140/70   Pulse 108   Temp 98.8 °F (37.1 °C) (Oral)   Resp 16   Ht 5' 3\" (1.6 m)   Wt 143 lb (64.9 kg)   SpO2 98%   BMI 25.33 kg/m²   GENERAL APPEARANCE: Awake and alert. Cooperative. No acute distress. HEAD: Normocephalic. Atraumatic. EYES: PERRL. EOM's grossly intact. No periorbital edema or erythema. No proptosis. No globe tenderness. No blood noted to anterior chambers. ENT: Mucous membranes are moist.  Oropharynx patent. No vesicles, blistering or sloughing. No pain with manipulation of external ears. No mastoid tenderness. Canals clear without edema or exudate. TMs are pink and pearly without bulge, retraction, or loss of landmark. Nasal turbinates mildly erythematous. Nares patent. Frontal and maxillary sinus tenderness bilaterally. LYMPH: No periauricular, submental, or cervical lymphadenopathy. NECK: Supple. No JVD. No tracheal tenderness or deviation. No crepitus. No meningismus. HEART: RRR. No murmurs. LUNGS: Respirations unlabored. CTAB. Good air exchange. Speaking comfortably in full sentences. No wheezing, rhonchi, rales. ABDOMEN: Soft. Non-distended. Non-tender. No guarding or rebound. No midline pulsatile mass. EXTREMITIES: No peripheral edema. No unilateral calf pain, redness or swelling. Moves all extremities equally. All extremities neurovascularly intact. SKIN: Warm and dry. No acute rashes. NEUROLOGICAL: Alert and oriented. CN's 2-12 intact. No gross facial drooping. Strength 5/5, sensation intact.    PSYCHIATRIC: Normal mood and affect. RADIOLOGY  Xr Chest (2 Vw)    Result Date: 11/4/2020  EXAMINATION: TWO XRAY VIEWS OF THE CHEST 11/4/2020 3:31 pm COMPARISON: October 7, 2020 HISTORY: ORDERING SYSTEM PROVIDED HISTORY: Loss of weight TECHNOLOGIST PROVIDED HISTORY: Reason for Exam: : Loss of weight Acuity: Acute Type of Exam: Initial FINDINGS: The lungs are clear. The cardiac silhouette is unremarkable. There are no pleural effusions. There is no pneumothorax. Postsurgical staples project over the right upper quadrant. No consolidation. Postsurgical staples noted as above. Scoliosis. Xr Chest Portable    Result Date: 11/11/2020  EXAMINATION: ONE XRAY VIEW OF THE CHEST 11/11/2020 9:12 pm COMPARISON: 11/04/2020 HISTORY: ORDERING SYSTEM PROVIDED HISTORY: cough TECHNOLOGIST PROVIDED HISTORY: Reason for exam:->cough FINDINGS: Patient is rotated. No confluent airspace disease. No pleural effusion or pneumothorax. Cardiac and mediastinal silhouettes are similar to prior. Clips are seen within the upper abdomen. Moderate stool within the colon. No acute airspace disease. Ct Maxillofacial W Contrast    Result Date: 11/13/2020  EXAMINATION: CT OF THE FACE WITH CONTRAST 11/11/2020 TECHNIQUE: CT of the face was performed with the administration of intravenous contrast. Multiplanar reformatted images are provided for review. Dose modulation, iterative reconstruction, and/or weight based adjustment of the mA/kV was utilized to reduce the radiation dose to as low as reasonably achievable. COMPARISON: None HISTORY: ORDERING SYSTEM PROVIDED HISTORY: severe pain TECHNOLOGIST PROVIDED HISTORY: Reason for exam:->severe pain Reason for Exam: Facial pressure and pains FINDINGS: PHARYNX/LARYNX: The palatine tonsils are normal in appearance. The tongue is normal in appearance. The valleculae, epiglottis, aryepiglottic folds and pyriform sinuses appear unremarkable. No mass or abscess is seen.  SALIVARY GLANDS: The parotid and submandibular glands appear unremarkable. LYMPH NODES: No cervical lymphadenopathy is seen. SOFT TISSUES: No appreciable soft tissue swelling or mass is seen. BRAIN/ORBITS/SINUSES: The visualized portion of the intracranial contents appear unremarkable. The visualized portion of the orbits are unremarkable. Right maxillary sinus fluid level. Scattered right ethmoidal air cell opacities and mucosal thickening. Remaining paranasal sinuses and mastoid air cells are clear. BONES:  No acute osseous abnormality is seen. Right maxillary sinusitis, with minimal involvement of the right ethmoid sinus as well. ED COURSE  Patient received Benadryl, Reglan and Fioricet for pain, with good relief. Triage vitals showing slight tachycardia pulse rate 108. Hasresolved without intervention. POC glucose >600. CBC showing some anemia with H&H at 8.8 and 29.0 respectively. Patient does have some baseline anemia this does appear slightly worse. She continues to deny any black or bloody stools. No lightheadedness. CMP showing blood glucose of 700 without anion gap. Slight baseline kidney dysfunction unchanged. Patient was given 10 units of insulin. VBG with pH 7.3. Urinalysis without ketonuria or evidence for infectious process. Status post fluids and insulin repeat blood sugar now 342. Headache has resolved. Will be discharged with Fioricet with recommendations for continuation of doxycycline as well as Flonase. ENT referral will also be provided should symptoms persist.  Have discussed return precautions and recommendations for follow-up otherwise and patient is in agreement and comfortable at discharge. A discussion was had with Ms. Fried regarding headache, ED findings and recommendations for follow-up. Risk management discussed and shared decision making had with patient and/or surrogate. All questions were answered.  Patient will follow up with PCP and ENT within 2 to 3 days for further Carboxyhemoglobin 4.1 (H) 0.0 - 1.5 %    MetHgb, Kyle 0.2 <1.5 %    TC02 (Calc), Kyle 24 Not Established mmol/L    O2 Content, Kyle 11 Not Established VOL %    O2 Therapy Unknown    Urinalysis   Result Value Ref Range    Color, UA Yellow Straw/Yellow    Clarity, UA Clear Clear    Glucose, Ur >=1000 (A) Negative mg/dL    Bilirubin Urine Negative Negative    Ketones, Urine Negative Negative mg/dL    Specific Gravity, UA 1.010 1.005 - 1.030    Blood, Urine Negative Negative    pH, UA 5.5 5.0 - 8.0    Protein, UA Negative Negative mg/dL    Urobilinogen, Urine 0.2 <2.0 E.U./dL    Nitrite, Urine Negative Negative    Leukocyte Esterase, Urine Negative Negative    Microscopic Examination Not Indicated     Urine Type NotGiven    POCT Glucose   Result Value Ref Range    POC Glucose >600 (AA) 70 - 99 mg/dl    Performed on ACCU-CHEK    POCT Glucose   Result Value Ref Range    Glucose 342 mg/dL   POCT Glucose   Result Value Ref Range    POC Glucose 440 (H) 70 - 99 mg/dl    Performed on ACCU-CHEK    POCT Glucose   Result Value Ref Range    POC Glucose 342 (H) 70 - 99 mg/dl    Performed on ACCU-CHEK      I estimate there is LOW risk for SUBARACHNOID HEMORRHAGE, MENINGITIS, INTRACRANIAL HEMORRHAGE, SUBDURAL HEMATOMA, OR STROKE, thus I consider the discharge disposition reasonable. Carmelita Elizabeth and I have discussed the diagnosis and risks, and we agree with discharging home to follow-up with their primary doctor. We also discussed returning to the Emergency Department immediately if new or worsening symptoms occur. We have discussed the symptoms which are most concerning (e.g., changing or worsening pain, weakness, vomiting, fever) that necessitate immediate return. Final Impression  1. Nonintractable headache, unspecified chronicity pattern, unspecified headache type    2. Hyperglycemia      Discharge Vital Signs:  Blood pressure (!) 113/57, pulse 86, temperature 98.8 °F (37.1 °C), temperature source Oral, resp.  rate 16, height 5'

## 2020-11-17 NOTE — ED NOTES
Discharge instructions reviewed with Pt. Pt verbalizes understanding at this time. Prescriptions/medications reviewed with pt at this time. VS as noted. Pt condition stable at this time. No concerns voiced.       Lia Casillas RN  11/17/20 [id number]

## 2020-11-17 NOTE — TELEPHONE ENCOUNTER
Called to discuss d/c instructions for + nitrite urine. Pt continues to not endorse urinary symptoms. She was told again verbally that she needs to follow up with PCP when she is done with abx to ensure this clears, and that in absence of symptoms likely not suffering from UTI. Reached out because this information did not make it onto her dc paperwork. She understands and voiced will call her PCP for ER follow up. Additionally, notes that she is not feeling better. She notes she is taking her abx. Pt advised she needs re-evaluation with either PCP or return to the ER. She voiced understanding. All questions addressed.

## 2020-11-17 NOTE — ED PROVIDER NOTES
I independently performed a history and physical on Hugo Matos. All diagnostic, treatment, and disposition decisions were made by myself in conjunction with the advanced practice provider. Briefly, this is a 63 y.o. female here for headache and is not improving. She was recently diagnosed with sinusitis. No fevers or chills. She also believes that her blood sugar may be elevated. .    On exam, nontoxic-appearing adult female in no acute distress. She has tenderness over the frontal and maxillary sinuses. Abdomen is soft nondistended nontender. Heart and lung sounds normal.    EKG  The Ekg interpreted by me in the absence of a cardiologist shows:    No significant change from prior EKG dated         Screenings            Critical Time  None       MDM  Adult female who comes in with headache. Also concerned that her blood sugar may be elevated. The patient's Accu-Chek revealed that her sugar is high. On blood chemistry her glucose was greater than 700 she is given IV fluids as well as food. Her blood sugar is trending down. She is given medications for her headache and the patient has improvement of her symptoms. Headache is completely gone. At this point I have little suspicious of life-threatening headaches and the patient will be discharged home. Close follow-up is recommended. Return instructions discussed. There are no complications of her hyperglycemia.     Patient Referrals:  Miriam Blake  [address] [place] [address]  [phone]    Schedule an appointment as soon as possible for a visit       Susan Dang MD  [address]  [address]  [phone]    Schedule an appointment as soon as possible for a visit       [facility]  ED  [facility] [address] [phone]    If symptoms worsen      Discharge Medications:  Discharge Medication List as of 11/17/2020  3:02 AM      START taking these medications Details   butalbital-aspirin-caffeine (FIORINAL) -40 MG capsule Take 1 capsule by mouth every 4 hours as needed for Headaches for up to 4 days. , Disp-15 capsule,R-0Print             FINAL IMPRESSION  1. Nonintractable headache, unspecified chronicity pattern, unspecified headache type    2. Hyperglycemia        Blood pressure (!) 113/57, pulse 86, temperature 98.8 °F (37.1 °C), temperature source Oral, resp. rate 16, height 5' 3\" (1.6 m), weight 143 lb (64.9 kg), SpO2 99 %, not currently breastfeeding. For further details of [facility] emergency department encounter, please see documentation by advanced practice provider.       Jose Vargas MD  11/17/20 0827

## 2020-12-10 ENCOUNTER — OFFICE VISIT (OUTPATIENT)
Dept: ENDOCRINOLOGY | Age: 60
End: 2020-12-10
Payer: MEDICAID

## 2020-12-10 VITALS
OXYGEN SATURATION: 100 % | WEIGHT: 128.4 LBS | HEART RATE: 68 BPM | SYSTOLIC BLOOD PRESSURE: 138 MMHG | HEIGHT: 63 IN | TEMPERATURE: 97.3 F | DIASTOLIC BLOOD PRESSURE: 60 MMHG | BODY MASS INDEX: 22.75 KG/M2

## 2020-12-10 PROCEDURE — G8484 FLU IMMUNIZE NO ADMIN: HCPCS | Performed by: INTERNAL MEDICINE

## 2020-12-10 PROCEDURE — G8427 DOCREV CUR MEDS BY ELIG CLIN: HCPCS | Performed by: INTERNAL MEDICINE

## 2020-12-10 PROCEDURE — 99214 OFFICE O/P EST MOD 30 MIN: CPT | Performed by: INTERNAL MEDICINE

## 2020-12-10 PROCEDURE — G8420 CALC BMI NORM PARAMETERS: HCPCS | Performed by: INTERNAL MEDICINE

## 2020-12-10 PROCEDURE — 1036F TOBACCO NON-USER: CPT | Performed by: INTERNAL MEDICINE

## 2020-12-10 PROCEDURE — 3046F HEMOGLOBIN A1C LEVEL >9.0%: CPT | Performed by: INTERNAL MEDICINE

## 2020-12-10 PROCEDURE — 3017F COLORECTAL CA SCREEN DOC REV: CPT | Performed by: INTERNAL MEDICINE

## 2020-12-10 PROCEDURE — 2022F DILAT RTA XM EVC RTNOPTHY: CPT | Performed by: INTERNAL MEDICINE

## 2020-12-10 RX ORDER — EMPAGLIFLOZIN 25 MG/1
TABLET, FILM COATED ORAL
COMMUNITY
Start: 2020-12-03 | End: 2021-03-09

## 2020-12-10 NOTE — PROGRESS NOTES
Patient ID:   Hugo Matos is a 63 y.o. female  Chief Complaint:   Hugo Matos presents for an evaluation of Type 2 Diabetes Mellitus , Hyperlipidemia and hypertension. Subjective:   Type 2 Diabetes Mellitus diagnosed around 2010  On insulin since 2012  Previous regimen:Taking Admelog 15 units tidac and 5 units for snacks. Basaglar 40 units once a day at night. VGO stopped due to hypoglycemias     Humalog stopped as she was missing lantus dose with Humalogs     In June 2020 Max Fortis and amaryl was stopped     Lantus 26 units daily in am. She was told to take 35 units in am   Synjardy 12.5-1000mg, two tabs in am. Did not  since Aug 2020. Victoza 1.8 mg daily in AM      Admits making poor dietary choices, trying to cut down fried. Multiple cancellations/no shows      Hospitalization July 2020 for high blood sugars. she thinks she was eating too much carbs     Checks blood sugars 2-3 times per day. Reviewed, 165 - > 500    AM:     Lunch:   Supper:    HS:        Hypoglycemias: Once in last 4 weeks      Meals: 3, dinner is big. Snacks (jellos, fruits, pretzels) after every meal because she is hungry. Exercise: None    Denies chest pain, exertional dyspnea. Family history of CAD: Both parents  Denies smoking/ alcohol.    Currently on  mg daily     The following portions of the patient's history were reviewed and updated as appropriate:       Family History   Problem Relation Age of Onset    Cancer Mother         breast    Cancer Father     Heart Failure Neg Hx     High Cholesterol Neg Hx     Hypertension Neg Hx     Migraines Neg Hx     Rashes/Skin Problems Neg Hx     Seizures Neg Hx     Stroke Neg Hx     Thyroid Disease Neg Hx     Diabetes Neg Hx          Social History     Socioeconomic History    Marital status:      Spouse name: Not on file    Number of children: Not on file    Years of education: Not on file    Highest education level: Not on file   Occupational (Patient taking differently: Inject 26 Units into the skin every morning ), Disp: 1 pen, Rfl: 1    Liraglutide (VICTOZA) 18 MG/3ML SOPN SC injection, Inject 1.8 mg into the skin daily, Disp: 3 pen, Rfl: 5    blood glucose test strips (TRUE METRIX BLOOD GLUCOSE TEST) strip, USE AS DIRECTED TO TEST 4 TIMES A DAY, Disp: 100 strip, Rfl: 5    pantoprazole (PROTONIX) 40 MG tablet, Take 1 tablet by mouth every morning (before breakfast), Disp: 30 tablet, Rfl: 3    nitroGLYCERIN (NITROSTAT) 0.4 MG SL tablet, up to max of 3 total doses. If no relief after 1 dose, call 911., Disp: 25 tablet, Rfl: 3    Insulin Pen Needle (UNIFINE PENTIPS) 32G X 4 MM MISC, USE AS DIRECTED 3 TIMES A DAY, Disp: 100 each, Rfl: 6    ticagrelor (BRILINTA) 90 MG TABS tablet, Take 1 tablet by mouth 2 times daily, Disp: 60 tablet, Rfl: 11    simvastatin (ZOCOR) 20 MG tablet, Take 20 mg by mouth nightly, Disp: , Rfl:     cetirizine (ZYRTEC) 10 MG tablet, Take 10 mg by mouth daily, Disp: , Rfl:     lisinopril (PRINIVIL;ZESTRIL) 10 MG tablet, Take 10 mg by mouth daily, Disp: , Rfl:     Empagliflozin-metFORMIN HCl 12.5-1000 MG TABS, Take 2 tablets by mouth daily, Disp: 60 tablet, Rfl: 5    Blood Pressure Monitor GINA, Check BP at home once or twice a day, Disp: 1 Device, Rfl: 0    Insulin Syringe-Needle U-100 31G X 5/16\" 0.3 ML MISC, USE 3 TIMES A DAY BEFORE MEALS, Disp: 90 each, Rfl: 7    calcium carbonate-vitamin D (CALTRATE) 600-400 MG-UNIT TABS per tab, , Disp: , Rfl:     letrozole (FEMARA) 2.5 MG tablet, TAKE 1 TABLET BY MOUTH EVERY DAY, Disp: , Rfl: 3    glucagon 1 MG injection, Inject 1 mg into the muscle See Admin Instructions Follow package directions for low blood sugar., Disp: 1 kit, Rfl: 0    clonazePAM (KLONOPIN) 1 MG tablet, Take 1 mg by mouth as needed.  ., Disp: , Rfl:     paliperidone (INVEGA) 9 MG extended release tablet, Take 9 mg by mouth every morning , Disp: , Rfl:     aspirin 81 MG tablet, Take 81 mg by mouth daily, Disp: , Rfl:     gabapentin (NEURONTIN) 600 MG tablet, Take 600 mg by mouth 3 times daily, Disp: , Rfl:     oxyCODONE-acetaminophen (PERCOCET) 5-325 MG per tablet, Take 1 tablet by mouth every 6 hours as needed for Pain. ., Disp: , Rfl:     traZODone (DESYREL) 100 MG tablet, Take 100 mg by mouth nightly, Disp: , Rfl:     butalbital-aspirin-caffeine (FIORINAL) -40 MG capsule, Take 1 capsule by mouth every 4 hours as needed for Headaches for up to 4 days. , Disp: 15 capsule, Rfl: 0      Review of Systems:    Constitutional: Negative for fever, chills, and unexpected weight change. HENT: Negative for congestion, ear pain, rhinorrhea,  sore throat and trouble swallowing. Eyes: Negative for photophobia, redness, itching. Respiratory: Negative for cough, shortness of breath and sputum. Cardiovascular: Negative for chest pain, palpitations and leg swelling. Gastrointestinal: Negative for nausea, vomiting, abdominal pain, diarrhea, constipation. Endocrine: Negative for cold intolerance, heat intolerance, polydipsia, polyphagia and polyuria. Genitourinary: Negative for dysuria, urgency, frequency, hematuria and flank pain. Musculoskeletal: Negative for myalgias, back pain, arthralgias and neck pain. Skin/Nail: Negative for rash, itching. Normal nails. Neurological: Negative for seizures, weakness, light-headedness, numbness and headaches. Hematological/ Lymph nodes: Negative for adenopathy. Does not bruise/bleed easily. Psychiatric/Behavioral: Negative for suicidal ideas, depression, anxiety, sleep disturbance and decreased concentration. Objective:   Physical Exam:  /60 (Site: Right Upper Arm, Position: Sitting, Cuff Size: Medium Adult)   Pulse 68   Temp 97.3 °F (36.3 °C) (Temporal)   Ht 5' 3\" (1.6 m)   Wt 128 lb 6.4 oz (58.2 kg)   SpO2 100%   BMI 22.75 kg/m²     Constitutional: Patient is oriented to person, place, and time.  Patient appears well-developed and well-nourished. HENT:               Head: Normocephalic and atraumatic. Eyes: Conjunctivae and EOM are normal.                Neck: Normal range of motion. Thyroid exam normal.   Cardiovascular: Normal rate, regular rhythm and normal heart sounds. Pulmonary/Chest: Effort normal and breath sounds normal.   Abdominal: Soft. Bowel sounds are normal.   Musculoskeletal: Normal range of motion. Neurological: Patient is alert and oriented to person, place, and time. Patient has normal reflexes. Skin: Skin is warm and dry. Psychiatric: Patient has a normal mood and affect.  Patient behavior is normal.     Lab Review:    Admission on 11/17/2020, Discharged on 11/17/2020   Component Date Value Ref Range Status    POC Glucose 11/17/2020 >600* 70 - 99 mg/dl Final    Performed on 11/17/2020 ACCU-CHEK   Final    WBC 11/17/2020 5.8  4.0 - 11.0 K/uL Final    RBC 11/17/2020 3.03* 4.00 - 5.20 M/uL Final    Hemoglobin 11/17/2020 8.8* 12.0 - 16.0 g/dL Final    Hematocrit 11/17/2020 29.0* 36.0 - 48.0 % Final    MCV 11/17/2020 95.8  80.0 - 100.0 fL Final    MCH 11/17/2020 29.2  26.0 - 34.0 pg Final    MCHC 11/17/2020 30.4* 31.0 - 36.0 g/dL Final    RDW 11/17/2020 14.4  12.4 - 15.4 % Final    Platelets 15/78/3898 258  135 - 450 K/uL Final    MPV 11/17/2020 9.6  5.0 - 10.5 fL Final    Neutrophils % 11/17/2020 72.0  % Final    Lymphocytes % 11/17/2020 22.1  % Final    Monocytes % 11/17/2020 4.7  % Final    Eosinophils % 11/17/2020 0.5  % Final    Basophils % 11/17/2020 0.7  % Final    Neutrophils Absolute 11/17/2020 4.2  1.7 - 7.7 K/uL Final    Lymphocytes Absolute 11/17/2020 1.3  1.0 - 5.1 K/uL Final    Monocytes Absolute 11/17/2020 0.3  0.0 - 1.3 K/uL Final    Eosinophils Absolute 11/17/2020 0.0  0.0 - 0.6 K/uL Final    Basophils Absolute 11/17/2020 0.0  0.0 - 0.2 K/uL Final    Sodium 11/17/2020 129* 136 - 145 mmol/L Final    Potassium 11/17/2020 4.5  3.5 - 5.1 mmol/L Final    Chloride 11/17/2020 95* 99 - 110 mmol/L Final    CO2 11/17/2020 23  21 - 32 mmol/L Final    Anion Gap 11/17/2020 11  3 - 16 Final    Glucose 11/17/2020 700* 70 - 99 mg/dL Final    BUN 11/17/2020 11  7 - 20 mg/dL Final    CREATININE 11/17/2020 1.4* 0.6 - 1.2 mg/dL Final    GFR Non- 11/17/2020 38* >60 Final    GFR  11/17/2020 46* >60 Final    Calcium 11/17/2020 9.0  8.3 - 10.6 mg/dL Final    Total Protein 11/17/2020 7.3  6.4 - 8.2 g/dL Final    Alb 11/17/2020 3.9  3.4 - 5.0 g/dL Final    Albumin/Globulin Ratio 11/17/2020 1.1  1.1 - 2.2 Final    Total Bilirubin 11/17/2020 <0.2  0.0 - 1.0 mg/dL Final    Alkaline Phosphatase 11/17/2020 314* 40 - 129 U/L Final    ALT 11/17/2020 42* 10 - 40 U/L Final    AST 11/17/2020 54* 15 - 37 U/L Final    Globulin 11/17/2020 3.4  g/dL Final    pH, [facility] 11/17/2020 7.314* 7.350 - 7.450 Final    pCO2, [facility] 11/17/2020 44.5  40.0 - 50.0 mmHg Final    pO2, [facility] 11/17/2020 47.4* 25.0 - 40.0 mmHg Final    HCO3, Venous 11/17/2020 22.1* 23.0 - 29.0 mmol/L Final    Base Excess, [facility] 11/17/2020 -3.9* -3.0 - 3.0 mmol/L Final    O2 Sat, [facility] 11/17/2020 82  Not Established % Final    Carboxyhemoglobin 11/17/2020 4.1* 0.0 - 1.5 % Final    MetHgb, [facility] 11/17/2020 0.2  <1.5 % Final    TC02 (Calc), [facility] 11/17/2020 24  Not Established mmol/L Final    O2 Content, [facility] 11/17/2020 11  Not Established VOL % Final    O2 Therapy 11/17/2020 Unknown   Final    Color, UA 11/17/2020 Yellow  Straw/Yellow Final    Clarity, UA 11/17/2020 Clear  Clear Final    Glucose, Ur 11/17/2020 >=1000* Negative mg/dL Final    Bilirubin Urine 11/17/2020 Negative  Negative Final    Ketones, Urine 11/17/2020 Negative  Negative mg/dL Final    Specific [place], UA 11/17/2020 1.010  1.005 - 1.030 Final    Blood, Urine 11/17/2020 Negative  Negative Final    pH, UA 11/17/2020 5.5  5.0 - 8.0 Final    Protein, UA 11/17/2020 Negative  Negative mg/dL Final    Urobilinogen, Urine 11/17/2020 0. 2  <2.0 E.U./dL Final    Nitrite, Urine 11/17/2020 Negative  Negative Final    Leukocyte Esterase, Urine 11/17/2020 Negative  Negative Final    Microscopic Examination 11/17/2020 Not Indicated   Final    Urine Type 11/17/2020 NotGiven   Final    Glucose 11/17/2020 342  mg/dL Final    POC Glucose 11/17/2020 440* 70 - 99 mg/dl Final    Performed on 11/17/2020 ACCU-CHEK   Final    POC Glucose 11/17/2020 342* 70 - 99 mg/dl Final    Performed on 11/17/2020 ACCU-CHEK   Final   Admission on 11/11/2020, Discharged on 11/12/2020   Component Date Value Ref Range Status    Rapid Influenza A Ag 11/11/2020 Negative  Negative Final    Rapid Influenza B Ag 11/11/2020 Negative  Negative Final    Rapid Strep A Screen 11/11/2020 Negative  Negative Final    SARS-CoV-2, [facility] 11/11/2020 Not Detected  Not Detected Final    Strep A Culture 11/11/2020 Normal oral katherine, No Beta Strep isolated   Final    Glucose 11/12/2020 162  mg/dL Final    POC Glucose 11/11/2020 434* 70 - 99 mg/dl Final    Performed on 11/11/2020 ACCU-CHEK   Final    WBC 11/11/2020 7.6  4.0 - 11.0 K/uL Final    RBC 11/11/2020 3.65* 4.00 - 5.20 M/uL Final    Hemoglobin 11/11/2020 10.4* 12.0 - 16.0 g/dL Final    Hematocrit 11/11/2020 33.8* 36.0 - 48.0 % Final    MCV 11/11/2020 92.4  80.0 - 100.0 fL Final    MCH 11/11/2020 28.5  26.0 - 34.0 pg Final    MCHC 11/11/2020 30.9* 31.0 - 36.0 g/dL Final    RDW 11/11/2020 13.7  12.4 - 15.4 % Final    Platelets 07/93/1863 220  135 - 450 K/uL Final    MPV 11/11/2020 10.0  5.0 - 10.5 fL Final    Neutrophils % 11/11/2020 68.6  % Final    Lymphocytes % 11/11/2020 22.6  % Final    Monocytes % 11/11/2020 8.0  % Final    Eosinophils % 11/11/2020 0.4  % Final    Basophils % 11/11/2020 0.4  % Final    Neutrophils Absolute 11/11/2020 5.2  1.7 - 7.7 K/uL Final    Lymphocytes Absolute 11/11/2020 1.7  1.0 - 5.1 K/uL Final    Monocytes Absolute 11/11/2020 0.6  0.0 - 1.3 K/uL Final    Eosinophils Absolute 11/11/2020 0.0  0.0 - 0.6 K/uL Final    Basophils Absolute 11/11/2020 0.0  0.0 - 0.2 K/uL Final    Sodium 11/11/2020 134* 136 - 145 mmol/L Final    Potassium reflex Magnesium 11/11/2020 3.9  3.5 - 5.1 mmol/L Final    Chloride 11/11/2020 97* 99 - 110 mmol/L Final    CO2 11/11/2020 22  21 - 32 mmol/L Final    Anion Gap 11/11/2020 15  3 - 16 Final    Glucose 11/11/2020 377* 70 - 99 mg/dL Final    BUN 11/11/2020 16  7 - 20 mg/dL Final    CREATININE 11/11/2020 1.2  0.6 - 1.2 mg/dL Final    GFR Non- 11/11/2020 46* >60 Final    GFR  11/11/2020 55* >60 Final    Calcium 11/11/2020 9.8  8.3 - 10.6 mg/dL Final    Total Protein 11/11/2020 7.9  6.4 - 8.2 g/dL Final    Alb 11/11/2020 3.3* 3.4 - 5.0 g/dL Final    Albumin/Globulin Ratio 11/11/2020 0.7* 1.1 - 2.2 Final    Total Bilirubin 11/11/2020 0.4  0.0 - 1.0 mg/dL Final    Alkaline Phosphatase 11/11/2020 201* 40 - 129 U/L Final    ALT 11/11/2020 28  10 - 40 U/L Final    AST 11/11/2020 18  15 - 37 U/L Final    Globulin 11/11/2020 4.6  g/dL Final    Color, UA 11/11/2020 Straw  Straw/Yellow Final    Clarity, UA 11/11/2020 SL CLOUDY* Clear Final    Glucose, Ur 11/11/2020 >=1000* Negative mg/dL Final    Bilirubin Urine 11/11/2020 Negative  Negative Final    Ketones, Urine 11/11/2020 TRACE* Negative mg/dL Final    Specific [place], UA 11/11/2020 <=1.005  1.005 - 1.030 Final    Blood, Urine 11/11/2020 Negative  Negative Final    pH, UA 11/11/2020 5.5  5.0 - 8.0 Final    Protein, UA 11/11/2020 Negative  Negative mg/dL Final    Urobilinogen, Urine 11/11/2020 0.2  <2.0 E.U./dL Final    Nitrite, Urine 11/11/2020 POSITIVE* Negative Final    Leukocyte Esterase, Urine 11/11/2020 Negative  Negative Final    Microscopic Examination 11/11/2020 YES   Final    Urine Type 11/11/2020 NotGiven   Final    pH, [facility] 11/11/2020 7.432  7.350 - 7.450 Final    pCO2, [facility] 11/11/2020 35.5* 40.0 - 50.0 mmHg Final    pO2, [facility] 11/11/2020 177.3* 25.0 - 40.0 mmHg Final    HCO3, Venous 11/11/2020 23.1  23.0 - 29.0 mmol/L Final    Base Excess, [facility] 11/11/2020 -0.7  -3.0 - 3.0 mmol/L Final    O2 Sat, [facility] 11/11/2020 98  Not Established % Final    Carboxyhemoglobin 11/11/2020 1.6* 0.0 - 1.5 % Final    MetHgb, [facility] 11/11/2020 0.0  <1.5 % Final    TC02 (Calc), [facility] 11/11/2020 24  Not Established mmol/L Final    O2 Content, [facility] 11/11/2020 16  Not Established VOL % Final    O2 Therapy 11/11/2020 Unknown   Final    WBC, UA 11/11/2020 21-50* 0 - 5 /HPF Final    RBC, UA 11/11/2020 None seen  0 - 4 /HPF Final    Epithelial Cells, UA 11/11/2020 2-5  0 - 5 /HPF Final    Bacteria, UA 11/11/2020 2+* None Seen /HPF Final    POC Glucose 11/12/2020 162* 70 - 99 mg/dl Final    Performed on 11/12/2020 ACCU-CHEK   Final   Hospital Outpatient Visit on 11/09/2020   Component Date Value Ref Range Status    CA 19-9 11/09/2020 58* 0 - 35 U/mL Final    CEA 11/09/2020 12.6* 0.0 - 5.0 ng/mL Final    Cortisol - AM 11/09/2020 24.3* 4.3 - 22.4 ug/dL Final    T3, Free 11/09/2020 1.4* 2.3 - 4.2 pg/mL Final    T4 Free 11/09/2020 1.1  0.9 - 1.8 ng/dL Final    TSH 11/09/2020 0.92  0.27 - 4.20 uIU/mL Final   Admission on 10/07/2020, Discharged on 10/08/2020   Component Date Value Ref Range Status    WBC 10/07/2020 6.7  4.0 - 11.0 K/uL Final    RBC 10/07/2020 3.58* 4.00 - 5.20 M/uL Final    Hemoglobin 10/07/2020 10.0* 12.0 - 16.0 g/dL Final    Hematocrit 10/07/2020 32.3* 36.0 - 48.0 % Final    MCV 10/07/2020 90.1  80.0 - 100.0 fL Final    MCH 10/07/2020 27.9  26.0 - 34.0 pg Final    MCHC 10/07/2020 30.9* 31.0 - 36.0 g/dL Final    RDW 10/07/2020 13.5  12.4 - 15.4 % Final    Platelets 29/89/0376 163  135 - 450 K/uL Final    MPV 10/07/2020 10.4  5.0 - 10.5 fL Final    Neutrophils % 10/07/2020 76.4  % Final    Lymphocytes % 10/07/2020 19.0  % Final    Monocytes % 10/07/2020 4.0  % Final    Eosinophils % 10/07/2020 0.1  % Final    Basophils % 10/07/2020 0.5  % Final    Neutrophils Absolute 10/07/2020 5.1  1.7 - 7.7 K/uL Final    Lymphocytes Absolute 10/07/2020 1.3  1.0 - 5.1 K/uL Final    Monocytes Absolute 10/07/2020 0.3  0.0 - 1.3 K/uL Final    Eosinophils Absolute 10/07/2020 0.0  0.0 - 0.6 K/uL Final    Basophils Absolute 10/07/2020 0.0  0.0 - 0.2 K/uL Final    Sodium 10/07/2020 127* 136 - 145 mmol/L Final    Potassium 10/07/2020 4.4  3.5 - 5.1 mmol/L Final    Chloride 10/07/2020 96* 99 - 110 mmol/L Final    CO2 10/07/2020 15* 21 - 32 mmol/L Final    Anion Gap 10/07/2020 16  3 - 16 Final    Glucose 10/07/2020 634* 70 - 99 mg/dL Final    BUN 10/07/2020 35* 7 - 20 mg/dL Final    CREATININE 10/07/2020 1.3* 0.6 - 1.2 mg/dL Final    GFR Non- 10/07/2020 42* >60 Final    GFR  10/07/2020 50* >60 Final    Calcium 10/07/2020 9.0  8.3 - 10.6 mg/dL Final    Total Protein 10/07/2020 7.0  6.4 - 8.2 g/dL Final    Alb 10/07/2020 3.7  3.4 - 5.0 g/dL Final    Albumin/Globulin Ratio 10/07/2020 1.1  1.1 - 2.2 Final    Total Bilirubin 10/07/2020 0.4  0.0 - 1.0 mg/dL Final    Alkaline Phosphatase 10/07/2020 167* 40 - 129 U/L Final    ALT 10/07/2020 42* 10 - 40 U/L Final    AST 10/07/2020 24  15 - 37 U/L Final    Globulin 10/07/2020 3.3  g/dL Final    pH, [facility] 10/07/2020 7.328* 7.350 - 7.450 Final    pCO2, [facility] 10/07/2020 31.0* 40.0 - 50.0 mmHg Final    pO2, [facility] 10/07/2020 162.1* 25.0 - 40.0 mmHg Final    HCO3, Venous 10/07/2020 15.9* 23.0 - 29.0 mmol/L Final    Base Excess, [facility] 10/07/2020 -9.0* -3.0 - 3.0 mmol/L Final    O2 Sat, [facility] 10/07/2020 98  Not Established % Final    Carboxyhemoglobin 10/07/2020 2.2* 0.0 - 1.5 % Final    MetHgb, [facility] 10/07/2020 0.1  <1.5 % Final    TC02 (Calc), [facility] 10/07/2020 17  Not Established mmol/L Final    O2 Content, [facility] 10/07/2020 15  Not Established VOL % Final    O2 Therapy 10/07/2020 Unknown   Final    Phosphorus 10/07/2020 3.6  2.5 - 4.9 mg/dL Final    Lactic Acid 10/07/2020 1.8  0.4 - 2.0 mmol/L Final    Ventricular Rate 10/07/2020 62  BPM Final    Atrial Rate 10/07/2020 62  BPM Final    P-R Interval 10/07/2020 144  ms Final    QRS Duration 10/07/2020 82  ms Final    Q-T Interval 10/07/2020 408  ms Final    QTc Calculation ([place]) 10/07/2020 414  ms Final    P Axis 10/07/2020 55  degrees Final    R Axis 10/07/2020 -32  degrees Final    T Axis 10/07/2020 44  degrees Final    Diagnosis 10/07/2020 Normal sinus rhythm with sinus arrhythmiaLeft axis deviationAbnormal ECGWhen compared with ECG of 11-JUL-2020 07:49,No significant change was foundConfirmed by Smooth Buck MD, [facility] ([id number]) on 10/8/2020 4:38:16 PM   Final    POC Glucose 10/07/2020 576* 70 - 99 mg/dl Final    Performed on 10/07/2020 ACCU-CHEK   Final    Color, UA 10/07/2020 Yellow  Straw/Yellow Final    Clarity, UA 10/07/2020 Clear  Clear Final    Glucose, Ur 10/07/2020 >=1000* Negative mg/dL Final    Bilirubin Urine 10/07/2020 Negative  Negative Final    Ketones, Urine 10/07/2020 TRACE* Negative mg/dL Final    Specific [place], UA 10/07/2020 1.010  1.005 - 1.030 Final    Blood, Urine 10/07/2020 SMALL* Negative Final    pH, UA 10/07/2020 5.5  5.0 - 8.0 Final    Protein, UA 10/07/2020 Negative  Negative mg/dL Final    Urobilinogen, Urine 10/07/2020 0.2  <2.0 E.U./dL Final    Nitrite, Urine 10/07/2020 Negative  Negative Final    Leukocyte Esterase, Urine 10/07/2020 Negative  Negative Final    Microscopic Examination 10/07/2020 YES   Final    Urine Type 10/07/2020 NotGiven   Final    Urine Reflex to Culture 10/07/2020 Not Indicated   Final    WBC, UA 10/07/2020 0-2  0 - 5 /HPF Final    RBC, UA 10/07/2020 5-10* 0 - 4 /HPF Final    Epithelial Cells, UA 10/07/2020 0-1  0 - 5 /HPF Final    Bacteria, UA 10/07/2020 Rare* None Seen /HPF Final    Amorphous, UA 10/07/2020 Rare  /HPF Final    Yeast, UA 10/07/2020 Present* None Seen /HPF Final    Magnesium 10/07/2020 1.90  1.80 - 2.40 mg/dL Final    Troponin 10/07/2020 <0.01 <0.01 ng/mL Final    POC Glucose 10/07/2020 544* 70 - 99 mg/dl Final    Performed on 10/07/2020 ACCU-CHEK   Final    Sodium 10/07/2020 137  136 - 145 mmol/L Final    Potassium 10/07/2020 3.0* 3.5 - 5.1 mmol/L Final    Chloride 10/07/2020 104  99 - 110 mmol/L Final    CO2 10/07/2020 21  21 - 32 mmol/L Final    Anion Gap 10/07/2020 12  3 - 16 Final    Glucose 10/07/2020 127* 70 - 99 mg/dL Final    BUN 10/07/2020 28* 7 - 20 mg/dL Final    CREATININE 10/07/2020 1.4* 0.6 - 1.2 mg/dL Final    GFR Non- 10/07/2020 38* >60 Final    GFR  10/07/2020 46* >60 Final    Calcium 10/07/2020 9.0  8.3 - 10.6 mg/dL Final    Sodium 10/08/2020 140  136 - 145 mmol/L Final    Potassium 10/08/2020 3.3* 3.5 - 5.1 mmol/L Final    Chloride 10/08/2020 109  99 - 110 mmol/L Final    CO2 10/08/2020 20* 21 - 32 mmol/L Final    Anion Gap 10/08/2020 11  3 - 16 Final    Glucose 10/08/2020 186* 70 - 99 mg/dL Final    BUN 10/08/2020 28* 7 - 20 mg/dL Final    CREATININE 10/08/2020 1.2  0.6 - 1.2 mg/dL Final    GFR Non- 10/08/2020 46* >60 Final    GFR  10/08/2020 55* >60 Final    Calcium 10/08/2020 8.7  8.3 - 10.6 mg/dL Final    Sodium 10/08/2020 137  136 - 145 mmol/L Final    Potassium 10/08/2020 4.1  3.5 - 5.1 mmol/L Final    Chloride 10/08/2020 107  99 - 110 mmol/L Final    CO2 10/08/2020 21  21 - 32 mmol/L Final    Anion Gap 10/08/2020 9  3 - 16 Final    Glucose 10/08/2020 213* 70 - 99 mg/dL Final    BUN 10/08/2020 25* 7 - 20 mg/dL Final    CREATININE 10/08/2020 1.2  0.6 - 1.2 mg/dL Final    GFR Non- 10/08/2020 46* >60 Final    GFR  10/08/2020 55* >60 Final    Calcium 10/08/2020 8.6  8.3 - 10.6 mg/dL Final    Magnesium 10/07/2020 1.90  1.80 - 2.40 mg/dL Final    Magnesium 10/08/2020 1.90  1.80 - 2.40 mg/dL Final    Magnesium 10/08/2020 1.90  1.80 - 2.40 mg/dL Final    Phosphorus 10/07/2020 2.6  2.5 - 4.9 mg/dL Final  Phosphorus 10/08/2020 3.1  2.5 - 4.9 mg/dL Final    Phosphorus 10/08/2020 3.2  2.5 - 4.9 mg/dL Final    POC Glucose 10/07/2020 419* 70 - 99 mg/dl Final    Performed on 10/07/2020 ACCU-CHEK   Final    POC Glucose 10/07/2020 305* 70 - 99 mg/dl Final    Performed on 10/07/2020 ACCU-CHEK   Final    POC Glucose 10/07/2020 195* 70 - 99 mg/dl Final    Performed on 10/07/2020 ACCU-CHEK   Final    POC Glucose 10/07/2020 126* 70 - 99 mg/dl Final    Performed on 10/07/2020 ACCU-CHEK   Final    POC Glucose 10/08/2020 136* 70 - 99 mg/dl Final    Performed on 10/08/2020 ACCU-CHEK   Final    POC Glucose 10/08/2020 165* 70 - 99 mg/dl Final    Performed on 10/08/2020 ACCU-CHEK   Final    POC Glucose 10/08/2020 180* 70 - 99 mg/dl Final    Performed on 10/08/2020 ACCU-CHEK   Final    POC Glucose 10/08/2020 191* 70 - 99 mg/dl Final    Performed on 10/08/2020 ACCU-CHEK   Final    POC Glucose 10/08/2020 186* 70 - 99 mg/dl Final    Performed on 10/08/2020 ACCU-CHEK   Final    POC Glucose 10/08/2020 209* 70 - 99 mg/dl Final    Performed on 10/08/2020 ACCU-CHEK   Final    Hemoglobin A1C 10/08/2020 11.9  See comment % Final    eAG 10/08/2020 294.8  mg/dL Final    POC Glucose 10/08/2020 172* 70 - 99 mg/dl Final    Performed on 10/08/2020 ACCU-CHEK   Final    POC Glucose 10/08/2020 138* 70 - 99 mg/dl Final    Performed on 10/08/2020 ACCU-CHEK   Final   Office Visit on 08/06/2020   Component Date Value Ref Range Status    SARS-CoV-2, ANTONI 08/06/2020 NOT DETECTED  NOT DETECTED Final   Admission on 08/04/2020, Discharged on 08/05/2020   Component Date Value Ref Range Status    POC Glucose 08/04/2020 587* 70 - 99 mg/dl Final    Performed on 08/04/2020 ACCU-CHEK   Final    WBC 08/04/2020 6.3  4.0 - 11.0 K/uL Final    RBC 08/04/2020 3.78* 4.00 - 5.20 M/uL Final    Hemoglobin 08/04/2020 10.4* 12.0 - 16.0 g/dL Final    Hematocrit 08/04/2020 34.0* 36.0 - 48.0 % Final    MCV 08/04/2020 89.9  80.0 - 100.0 fL Final  MCH 08/04/2020 27.4  26.0 - 34.0 pg Final    MCHC 08/04/2020 30.5* 31.0 - 36.0 g/dL Final    RDW 08/04/2020 15.0  12.4 - 15.4 % Final    Platelets 41/79/9383 183  135 - 450 K/uL Final    MPV 08/04/2020 9.6  5.0 - 10.5 fL Final    Neutrophils % 08/04/2020 59.9  % Final    Lymphocytes % 08/04/2020 32.4  % Final    Monocytes % 08/04/2020 6.7  % Final    Eosinophils % 08/04/2020 0.8  % Final    Basophils % 08/04/2020 0.2  % Final    Neutrophils Absolute 08/04/2020 3.8  1.7 - 7.7 K/uL Final    Lymphocytes Absolute 08/04/2020 2.0  1.0 - 5.1 K/uL Final    Monocytes Absolute 08/04/2020 0.4  0.0 - 1.3 K/uL Final    Eosinophils Absolute 08/04/2020 0.0  0.0 - 0.6 K/uL Final    Basophils Absolute 08/04/2020 0.0  0.0 - 0.2 K/uL Final    Sodium 08/04/2020 131* 136 - 145 mmol/L Final    Potassium reflex Magnesium 08/04/2020 4.4  3.5 - 5.1 mmol/L Final    Chloride 08/04/2020 96* 99 - 110 mmol/L Final    CO2 08/04/2020 20* 21 - 32 mmol/L Final    Anion Gap 08/04/2020 15  3 - 16 Final    Glucose 08/04/2020 662* 70 - 99 mg/dL Final    BUN 08/04/2020 17  7 - 20 mg/dL Final    CREATININE 08/04/2020 1.2  0.6 - 1.2 mg/dL Final    GFR Non- 08/04/2020 46* >60 Final    GFR  08/04/2020 55* >60 Final    Calcium 08/04/2020 9.2  8.3 - 10.6 mg/dL Final    Total Protein 08/04/2020 7.5  6.4 - 8.2 g/dL Final    Alb 08/04/2020 4.0  3.4 - 5.0 g/dL Final    Albumin/Globulin Ratio 08/04/2020 1.1  1.1 - 2.2 Final    Total Bilirubin 08/04/2020 <0.2  0.0 - 1.0 mg/dL Final    Alkaline Phosphatase 08/04/2020 201* 40 - 129 U/L Final    ALT 08/04/2020 43* 10 - 40 U/L Final    AST 08/04/2020 35  15 - 37 U/L Final    Globulin 08/04/2020 3.5  g/dL Final    Color, UA 08/04/2020 Yellow  Straw/Yellow Final    Clarity, UA 08/04/2020 Clear  Clear Final    Glucose, Ur 08/04/2020 >=1000* Negative mg/dL Final    Bilirubin Urine 08/04/2020 Negative  Negative Final    Ketones, Urine 08/04/2020 Negative  Negative mg/dL Final    Specific Gravity, UA 08/04/2020 1.010  1.005 - 1.030 Final    Blood, Urine 08/04/2020 Negative  Negative Final    pH, UA 08/04/2020 5.5  5.0 - 8.0 Final    Protein, UA 08/04/2020 Negative  Negative mg/dL Final    Urobilinogen, Urine 08/04/2020 0.2  <2.0 E.U./dL Final    Nitrite, Urine 08/04/2020 Negative  Negative Final    Leukocyte Esterase, Urine 08/04/2020 Negative  Negative Final    Microscopic Examination 08/04/2020 Not Indicated   Final    Urine Type 08/04/2020 NotGiven   Final    Urine Reflex to Culture 08/04/2020 Not Indicated   Final    pH, [facility] 08/04/2020 7.315* 7.350 - 7.450 Final    pCO2, [facility] 08/04/2020 36.0* 40.0 - 50.0 mmHg Final    pO2, [facility] 08/04/2020 81.4* 25.0 - 40.0 mmHg Final    HCO3, Venous 08/04/2020 17.9* 23.0 - 29.0 mmol/L Final    Base Excess, [facility] 08/04/2020 -7.5* -3.0 - 3.0 mmol/L Final    O2 Sat, [facility] 08/04/2020 95  Not Established % Final    Carboxyhemoglobin 08/04/2020 1.7* 0.0 - 1.5 % Final    MetHgb, [facility] 08/04/2020 0.6  <1.5 % Final    TC02 (Calc), [facility] 08/04/2020 19  Not Established mmol/L Final    O2 Content, [facility] 08/04/2020 15  Not Established VOL % Final    O2 Therapy 08/04/2020 Unknown   Final    Glucose 08/05/2020 379  mg/dL Final    POC Glucose 08/05/2020 520* 70 - 99 mg/dl Final    Performed on 08/05/2020 ACCU-CHEK   Final    POC Glucose 08/05/2020 379* 70 - 99 mg/dl Final    Performed on 08/05/2020 ACCU-CHEK   Final   Admission on 07/10/2020, Discharged on 07/11/2020   Component Date Value Ref Range Status    Troponin 07/10/2020 <0.01  <0.01 ng/mL Final    Troponin 07/11/2020 <0.01  <0.01 ng/mL Final    WBC 07/11/2020 4.7  4.0 - 11.0 K/uL Final    RBC 07/11/2020 3.65* 4.00 - 5.20 M/uL Final    Hemoglobin 07/11/2020 10.4* 12.0 - 16.0 g/dL Final    Hematocrit 07/11/2020 33.0* 36.0 - 48.0 % Final    MCV 07/11/2020 90.4  80.0 - 100.0 fL Final    MCH 07/11/2020 28.5  26.0 - 34.0 pg Final    MCHC 07/11/2020 31.5  31.0 - 36.0 g/dL Final    RDW 07/11/2020 14.1  12.4 - 15.4 % Final    Platelets 54/78/7905 172  135 - 450 K/uL Final    MPV 07/11/2020 9.9  5.0 - 10.5 fL Final    POC Glucose 07/10/2020 352* 70 - 99 mg/dl Final    Performed on 07/10/2020 ACCU-CHEK   Final    Ventricular Rate 07/11/2020 55  BPM Final    Atrial Rate 07/11/2020 55  BPM Final    P-R Interval 07/11/2020 156  ms Final    QRS Duration 07/11/2020 70  ms Final    Q-T Interval 07/11/2020 440  ms Final    QTc Calculation ([place]) 07/11/2020 420  ms Final    P Axis 07/11/2020 41  degrees Final    R Axis 07/11/2020 -14  degrees Final    T Axis 07/11/2020 54  degrees Final    Diagnosis 07/11/2020 Sinus bradycardiaOtherwise normal ECGWhen compared with ECG of 08-JUN-2020 12:22,No significant change was foundConfirmed by ALEXANDER Bustillos ([id number]) on 7/11/2020 10:12:54 AM   Final    POC Glucose 07/11/2020 299* 70 - 99 mg/dl Final    Performed on 07/11/2020 ACCU-CHEK   Final    POC Glucose 07/11/2020 152* 70 - 99 mg/dl Final    Performed on 07/11/2020 ACCU-CHEK   Final    POC Glucose 07/11/2020 293* 70 - 99 mg/dl Final    Performed on 07/11/2020 ACCU-CHEK   Final   Orders Only on 06/25/2020   Component Date Value Ref Range Status    Cholesterol, Total 06/25/2020 96  0 - 199 mg/dL Final    Triglycerides 06/25/2020 118  0 - 150 mg/dL Final    HDL 06/25/2020 39* 40 - 60 mg/dL Final    LDL Calculated 06/25/2020 33  <100 mg/dL Final    VLDL Cholesterol Calculated 06/25/2020 24  Not Established mg/dL Final    Sodium 06/25/2020 138  136 - 145 mmol/L Final    Potassium 06/25/2020 4.8  3.5 - 5.1 mmol/L Final    Chloride 06/25/2020 103  99 - 110 mmol/L Final    CO2 06/25/2020 19* 21 - 32 mmol/L Final    Anion Gap 06/25/2020 16  3 - 16 Final    Glucose 06/25/2020 271* 70 - 99 mg/dL Final    BUN 06/25/2020 20  7 - 20 mg/dL Final    CREATININE 06/25/2020 1.2  0.6 - 1.2 mg/dL Final    GFR Non- 06/25/2020 46* >60 Final    GFR  American 06/25/2020 55* >60 Final    Calcium 06/25/2020 9.2  8.3 - 10.6 mg/dL Final    Total Protein 06/25/2020 7.5  6.4 - 8.2 g/dL Final    Alb 06/25/2020 4.2  3.4 - 5.0 g/dL Final    Albumin/Globulin Ratio 06/25/2020 1.3  1.1 - 2.2 Final    Total Bilirubin 06/25/2020 <0.2  0.0 - 1.0 mg/dL Final    Alkaline Phosphatase 06/25/2020 178* 40 - 129 U/L Final    ALT 06/25/2020 32  10 - 40 U/L Final    AST 06/25/2020 21  15 - 37 U/L Final    Globulin 06/25/2020 3.3  g/dL Final    Hemoglobin A1C 06/25/2020 7.3  See comment % Final    eAG 06/25/2020 162.8  mg/dL Final    TSH Reflex FT4 06/25/2020 0.78  0.27 - 4.20 uIU/mL Final    Microalbumin, Random Urine 06/25/2020 3.00* <2.0 mg/dL Final    Creatinine, Ur 06/25/2020 75.0  28.0 - 259.0 mg/dL Final    Microalbumin Creatinine Ratio 06/25/2020 40.0* 0.0 - 30.0 mg/g Final   No results displayed because visit has over 200 results.       Admission on 04/22/2020, Discharged on 04/22/2020   Component Date Value Ref Range Status    POC Glucose 04/22/2020 454* 70 - 99 mg/dl Final    Performed on 04/22/2020 ACCU-CHEK   Final    WBC 04/22/2020 5.8  4.0 - 11.0 K/uL Final    RBC 04/22/2020 3.81* 4.00 - 5.20 M/uL Final    Hemoglobin 04/22/2020 10.8* 12.0 - 16.0 g/dL Final    Hematocrit 04/22/2020 34.2* 36.0 - 48.0 % Final    MCV 04/22/2020 89.9  80.0 - 100.0 fL Final    MCH 04/22/2020 28.3  26.0 - 34.0 pg Final    MCHC 04/22/2020 31.4  31.0 - 36.0 g/dL Final    RDW 04/22/2020 13.7  12.4 - 15.4 % Final    Platelets 99/57/8150 168  135 - 450 K/uL Final    MPV 04/22/2020 9.5  5.0 - 10.5 fL Final    Neutrophils % 04/22/2020 66.5  % Final    Lymphocytes % 04/22/2020 27.0  % Final    Monocytes % 04/22/2020 4.9  % Final    Eosinophils % 04/22/2020 1.1  % Final    Basophils % 04/22/2020 0.5  % Final    Neutrophils Absolute 04/22/2020 3.9  1.7 - 7.7 K/uL Final    Lymphocytes Absolute 04/22/2020 1.6  1.0 - 5.1 K/uL Final    Monocytes Absolute 04/22/2020 0.3  0.0 - 1.3 K/uL Final    Eosinophils Absolute 04/22/2020 0.1  0.0 - 0.6 K/uL Final    Basophils Absolute 04/22/2020 0.0  0.0 - 0.2 K/uL Final    Sodium 04/22/2020 135* 136 - 145 mmol/L Final    Potassium 04/22/2020 4.4  3.5 - 5.1 mmol/L Final    Chloride 04/22/2020 96* 99 - 110 mmol/L Final    CO2 04/22/2020 24  21 - 32 mmol/L Final    Anion Gap 04/22/2020 15  3 - 16 Final    Glucose 04/22/2020 468* 70 - 99 mg/dL Final    BUN 04/22/2020 23* 7 - 20 mg/dL Final    CREATININE 04/22/2020 1.5* 0.6 - 1.2 mg/dL Final    GFR Non- 04/22/2020 35* >60 Final    GFR  04/22/2020 43* >60 Final    Calcium 04/22/2020 9.6  8.3 - 10.6 mg/dL Final    Total Protein 04/22/2020 8.0  6.4 - 8.2 g/dL Final    Alb 04/22/2020 4.3  3.4 - 5.0 g/dL Final    Albumin/Globulin Ratio 04/22/2020 1.2  1.1 - 2.2 Final    Total Bilirubin 04/22/2020 <0.2  0.0 - 1.0 mg/dL Final    Alkaline Phosphatase 04/22/2020 142* 40 - 129 U/L Final    ALT 04/22/2020 25  10 - 40 U/L Final    AST 04/22/2020 16  15 - 37 U/L Final    Globulin 04/22/2020 3.7  g/dL Final    Color, UA 04/22/2020 Straw  Straw/Yellow Final    Clarity, UA 04/22/2020 Clear  Clear Final    Glucose, Ur 04/22/2020 >=1000* Negative mg/dL Final    Bilirubin Urine 04/22/2020 Negative  Negative Final    Ketones, Urine 04/22/2020 Negative  Negative mg/dL Final    Specific Gravity, UA 04/22/2020 1.010  1.005 - 1.030 Final    Blood, Urine 04/22/2020 Negative  Negative Final    pH, UA 04/22/2020 5.5  5.0 - 8.0 Final    Protein, UA 04/22/2020 Negative  Negative mg/dL Final    Urobilinogen, Urine 04/22/2020 0.2  <2.0 E.U./dL Final    Nitrite, Urine 04/22/2020 Negative  Negative Final    Leukocyte Esterase, Urine 04/22/2020 Negative  Negative Final    Microscopic Examination 04/22/2020 Not Indicated   Final    Urine Type 04/22/2020 NotGiven   Final    pH, [place] 04/22/2020 7.258* 7.350 - 7.450 Final    pCO2, [place] 04/22/2020 57.7* 40.0 - 50.0 mmHg Final  pO2, [place] 04/22/2020 36.6  25.0 - 40.0 mmHg Final    HCO3, Venous 04/22/2020 25.2  23.0 - 29.0 mmol/L Final    Base Excess, [place] 04/22/2020 -2.6  -3.0 - 3.0 mmol/L Final    O2 Sat, [facility] 04/22/2020 64  Not Established % Final    Carboxyhemoglobin 04/22/2020 1.2  0.0 - 1.5 % Final    MetHgb, [facility] 04/22/2020 0.4  <1.5 % Final    TC02 (Calc), [facility] 04/22/2020 27  Not Established mmol/L Final    O2 Content, [facility] 04/22/2020 10  Not Established VOL % Final    O2 Therapy 04/22/2020 Unknown   Final    Troponin 04/22/2020 <0.01  <0.01 ng/mL Final    Pro-BNP 04/22/2020 107  0 - 124 pg/mL Final    Magnesium 04/22/2020 2.00  1.80 - 2.40 mg/dL Final    Phosphorus 04/22/2020 4.5  2.5 - 4.9 mg/dL Final    POC Glucose 04/22/2020 220* 70 - 99 mg/dl Final    Performed on 04/22/2020 ACCU-CHEK   Final    Sodium 04/22/2020 142  136 - 145 mmol/L Final    Potassium 04/22/2020 3.7  3.5 - 5.1 mmol/L Final    Chloride 04/22/2020 108  99 - 110 mmol/L Final    CO2 04/22/2020 23  21 - 32 mmol/L Final    Anion Gap 04/22/2020 11  3 - 16 Final    Glucose 04/22/2020 58* 70 - 99 mg/dL Final    BUN 04/22/2020 21* 7 - 20 mg/dL Final    CREATININE 04/22/2020 1.2  0.6 - 1.2 mg/dL Final    GFR Non- 04/22/2020 46* >60 Final    GFR  04/22/2020 55* >60 Final    Calcium 04/22/2020 8.6  8.3 - 10.6 mg/dL Final    POC Glucose 04/22/2020 57* 70 - 99 mg/dl Final    Performed on 04/22/2020 ACCU-CHEK   Final    POC Glucose 04/22/2020 55* 70 - 99 mg/dl Final    Performed on 04/22/2020 ACCU-CHEK   Final    POC Glucose 04/22/2020 103* 70 - 99 mg/dl Final    Performed on 04/22/2020 ACCU-CHEK   Final   There may be more visits with results that are not included. Assessment and Plan     Twin Turcios was seen today for diabetes.     Diagnoses and all orders for this visit:    Uncontrolled type 2 diabetes mellitus with microalbuminuria, with long-term current use of insulin ([facility])  -      DIABETES FOOT EXAM    Uncontrolled type 2 diabetes mellitus with diabetic nephropathy, with long-term current use of insulin ([facility])  -      DIABETES FOOT EXAM    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 ([facility] [address]  -      DIABETES FOOT EXAM    Dyslipidemia associated with type 2 diabetes mellitus ([facility] [address]  -      DIABETES FOOT EXAM    Essential hypertension  -      DIABETES FOOT EXAM    Unintentional weight loss  -      DIABETES FOOT EXAM          1: Type 2 DM complicated with nephropathy, neuropathy TIAs, carotid stenosis   Uncontrolled A1C 11.9% < 7.3% < 8.4%<  6.8% < 10.1%  << 8.1 <  8% < 11.7%    Cr 1.2, GFR 55 Oct 2020   A1C of <8 would be acceptable because of microvascular and macrovascular complications. Questionable adherence to medical plan, treatment adherence and diet plan   Brianarmin Beth not approved     Need to bring meds on every visit      Keep Humalog off     Reiterated importance of taking care meds to control diabetes   Uncontrolled diabetes is likely contributing to her weight loss     Change Lantus to 35 units daily in am   Restart Synjardy, two tabs in AM   Victoza 1.8 mg daily in AM      All instructions provided in written. Check Blood sugars 4 times per day. Log them along with insulin and send them every 2 weeks. Call for blood sugars less than 60 or more than 400. Eye exam: Last exam in March 12th 2018, Needs an exam now. Foot exam:  March 2020    Deformity/amputation: absent  Skin lesions/pre-ulcerative calluses: absent  Edema: right- negative, left- negative  Sensory exam: Monofilament sensation: normal  Plus at least one of the following:   Pulses: normal,   Vibration (128 Hz):  Moderately decreased     Renal screen: High 68 Feb 2018, high 47 Jan 2019  > 40 June 2020     TSH screen: Feb 2018   Saw CDE in 2016 with Rd.     2: HTN   Controlled     3: Hyperlipidemia   LDL: 33, HDL: 39, TGs: 118 June 2020      Leg pains present   Continue simvastatin 20mg daily     4: Unintentional weigh tloss

## 2020-12-10 NOTE — PATIENT INSTRUCTIONS
Change Lantus to 35 units daily in am   Restart Synjardy, two tabs in AM   Victoza 1.8 mg daily in AM

## 2021-01-07 ENCOUNTER — HOSPITAL ENCOUNTER (EMERGENCY)
Age: 61
Discharge: HOME | End: 2021-01-07
Attending: EMERGENCY MEDICINE
Payer: MEDICAID

## 2021-01-07 VITALS
DIASTOLIC BLOOD PRESSURE: 46 MMHG | HEART RATE: 71 BPM | OXYGEN SATURATION: 100 % | TEMPERATURE: 98 F | SYSTOLIC BLOOD PRESSURE: 101 MMHG | RESPIRATION RATE: 18 BRPM

## 2021-01-07 DIAGNOSIS — R73.9 HYPERGLYCEMIA: Primary | ICD-10-CM

## 2021-01-07 LAB
A/G RATIO: 0.7 (ref 1.1–2.2)
ALBUMIN SERPL-MCNC: 3.5 G/DL (ref 3.4–5)
ALP BLD-CCNC: 316 U/L (ref 40–129)
ALT SERPL-CCNC: 35 U/L (ref 10–40)
ANION GAP SERPL CALCULATED.3IONS-SCNC: 16 MMOL/L (ref 3–16)
AST SERPL-CCNC: 26 U/L (ref 15–37)
BASOPHILS ABSOLUTE: 0 K/UL (ref 0–0.2)
BASOPHILS RELATIVE PERCENT: 0.3 %
BILIRUB SERPL-MCNC: 0.3 MG/DL (ref 0–1)
BILIRUBIN URINE: NEGATIVE
BLOOD, URINE: NEGATIVE
BUN BLDV-MCNC: 21 MG/DL (ref 7–20)
CALCIUM SERPL-MCNC: 9 MG/DL (ref 8.3–10.6)
CHLORIDE BLD-SCNC: 93 MMOL/L (ref 99–110)
CLARITY: CLEAR
CO2: 20 MMOL/L (ref 21–32)
COLOR: ABNORMAL
CREAT SERPL-MCNC: 1.1 MG/DL (ref 0.6–1.2)
EOSINOPHILS ABSOLUTE: 0 K/UL (ref 0–0.6)
EOSINOPHILS RELATIVE PERCENT: 0.5 %
GFR AFRICAN AMERICAN: >60
GFR NON-AFRICAN AMERICAN: 50
GLOBULIN: 4.7 G/DL
GLUCOSE BLD-MCNC: 394 MG/DL (ref 70–99)
GLUCOSE BLD-MCNC: 621 MG/DL (ref 70–99)
GLUCOSE BLD-MCNC: >600 MG/DL (ref 70–99)
GLUCOSE URINE: >=1000 MG/DL
HCT VFR BLD CALC: 32.9 % (ref 36–48)
HEMOGLOBIN: 9.8 G/DL (ref 12–16)
KETONES, URINE: NEGATIVE MG/DL
LEUKOCYTE ESTERASE, URINE: NEGATIVE
LYMPHOCYTES ABSOLUTE: 1.3 K/UL (ref 1–5.1)
LYMPHOCYTES RELATIVE PERCENT: 22.7 %
MAGNESIUM: 2 MG/DL (ref 1.8–2.4)
MCH RBC QN AUTO: 28 PG (ref 26–34)
MCHC RBC AUTO-ENTMCNC: 29.8 G/DL (ref 31–36)
MCV RBC AUTO: 93.9 FL (ref 80–100)
MICROSCOPIC EXAMINATION: ABNORMAL
MONOCYTES ABSOLUTE: 0.4 K/UL (ref 0–1.3)
MONOCYTES RELATIVE PERCENT: 7.1 %
NEUTROPHILS ABSOLUTE: 3.8 K/UL (ref 1.7–7.7)
NEUTROPHILS RELATIVE PERCENT: 69.4 %
NITRITE, URINE: NEGATIVE
PDW BLD-RTO: 14.8 % (ref 12.4–15.4)
PERFORMED ON: ABNORMAL
PERFORMED ON: ABNORMAL
PH UA: 5 (ref 5–8)
PHOSPHORUS: 3.3 MG/DL (ref 2.5–4.9)
PLATELET # BLD: 217 K/UL (ref 135–450)
PMV BLD AUTO: 10.1 FL (ref 5–10.5)
POTASSIUM REFLEX MAGNESIUM: 4.5 MMOL/L (ref 3.5–5.1)
PROTEIN UA: NEGATIVE MG/DL
RBC # BLD: 3.51 M/UL (ref 4–5.2)
SODIUM BLD-SCNC: 129 MMOL/L (ref 136–145)
SPECIFIC GRAVITY UA: <=1.005 (ref 1–1.03)
TOTAL PROTEIN: 8.2 G/DL (ref 6.4–8.2)
URINE REFLEX TO CULTURE: ABNORMAL
URINE TYPE: ABNORMAL
UROBILINOGEN, URINE: 0.2 E.U./DL
WBC # BLD: 5.5 K/UL (ref 4–11)

## 2021-01-07 PROCEDURE — 83735 ASSAY OF MAGNESIUM: CPT

## 2021-01-07 PROCEDURE — 80053 COMPREHEN METABOLIC PANEL: CPT

## 2021-01-07 PROCEDURE — 81003 URINALYSIS AUTO W/O SCOPE: CPT

## 2021-01-07 PROCEDURE — 2580000003 HC RX 258: Performed by: EMERGENCY MEDICINE

## 2021-01-07 PROCEDURE — 85025 COMPLETE CBC W/AUTO DIFF WBC: CPT

## 2021-01-07 PROCEDURE — 6370000000 HC RX 637 (ALT 250 FOR IP): Performed by: EMERGENCY MEDICINE

## 2021-01-07 PROCEDURE — 84100 ASSAY OF PHOSPHORUS: CPT

## 2021-01-07 PROCEDURE — 82010 KETONE BODYS QUAN: CPT

## 2021-01-07 PROCEDURE — 99284 EMERGENCY DEPT VISIT MOD MDM: CPT

## 2021-01-07 PROCEDURE — 96374 THER/PROPH/DIAG INJ IV PUSH: CPT

## 2021-01-07 RX ORDER — 0.9 % SODIUM CHLORIDE 0.9 %
1000 INTRAVENOUS SOLUTION INTRAVENOUS ONCE
Status: COMPLETED | OUTPATIENT
Start: 2021-01-07 | End: 2021-01-07

## 2021-01-07 RX ORDER — ONDANSETRON 4 MG/1
4 TABLET, ORALLY DISINTEGRATING ORAL EVERY 8 HOURS PRN
Qty: 10 TABLET | Refills: 0 | Status: ON HOLD | OUTPATIENT
Start: 2021-01-07 | End: 2021-01-26

## 2021-01-07 RX ORDER — CALCIUM CITRATE/VITAMIN D3 200MG-6.25
TABLET ORAL
Qty: 100 STRIP | Refills: 5 | Status: ON HOLD | OUTPATIENT
Start: 2021-01-07 | End: 2021-06-04 | Stop reason: HOSPADM

## 2021-01-07 RX ADMIN — SODIUM CHLORIDE 1000 ML: 9 INJECTION, SOLUTION INTRAVENOUS at 21:01

## 2021-01-07 RX ADMIN — INSULIN HUMAN 10 UNITS: 100 INJECTION, SOLUTION PARENTERAL at 21:32

## 2021-01-08 LAB — BETA-HYDROXYBUTYRATE: 1.39 MMOL/L (ref 0–0.27)

## 2021-01-09 NOTE — ED PROVIDER NOTES
endoscopy 2016    [place]:per pt' & her family member was nml.     Scoliosis     Spondylosis of lumbar region without myelopathy or radiculopathy 3/10/2017    Transient cerebral ischemia 07/15/2016    TIA:7/10/16    Unspecified cerebral artery occlusion with cerebral infarction     TIA     Past Surgical History:   Procedure Laterality Date    BREAST LUMPECTOMY      Bilateral:breast cancer    CARDIAC CATHETERIZATION  2020    Dr. Jeanette Carter), DAYANA to Diag 1    HYSTERECTOMY      Benign:no cervical cancer per pt'    KIDNEY REMOVAL      right    OTHER SURGICAL HISTORY Right     orif right ankle    TUBAL LIGATION       Family History   Problem Relation Age of Onset    Cancer Mother         breast    Cancer Father     Heart Failure Neg Hx     High Cholesterol Neg Hx     Hypertension Neg Hx     Migraines Neg Hx     Rashes/Skin Problems Neg Hx     Seizures Neg Hx     Stroke Neg Hx     Thyroid Disease Neg Hx     Diabetes Neg Hx      Social History     Socioeconomic History    Marital status:      Spouse name: Not on file    Number of children: Not on file    Years of education: Not on file    Highest education level: Not on file   Occupational History    Occupation:    Social Needs    Financial resource strain: Not on file    Food insecurity     Worry: Not on file     Inability: Not on file    Transportation needs     Medical: Not on file     Non-medical: Not on file   Tobacco Use    Smoking status: Former Smoker     Packs/day: 0.50     Years: 20.00     Pack years: 10.00     Types: Cigarettes, Cigars     Quit date: 7/3/2014     Years since quittin.5    Smokeless tobacco: Never Used   Substance and Sexual Activity    Alcohol use: No     Alcohol/week: 0.0 standard drinks    Drug use: No    Sexual activity: Never   Lifestyle    Physical activity     Days per week: Not on file     Minutes per session: Not on file    Stress: Not on file Relationships    Social connections     Talks on phone: Not on file     Gets together: Not on file     Attends Church service: Not on file     Active member of club or organization: Not on file     Attends meetings of clubs or organizations: Not on file     Relationship status: Not on file    Intimate partner violence     Fear of current or ex partner: Not on file     Emotionally abused: Not on file     Physically abused: Not on file     Forced sexual activity: Not on file   Other Topics Concern    Not on file   Social History Narrative    Not on file     No current facility-administered medications for this encounter. Current Outpatient Medications   Medication Sig Dispense Refill    ondansetron (ZOFRAN ODT) 4 MG disintegrating tablet Place 1 tablet under the tongue every 8 hours as needed for Nausea or Vomiting 10 tablet 0    TRUE METRIX BLOOD GLUCOSE TEST strip USE AS DIRECTED TO TEST 4 TIMES A  strip 5    JARDIANCE 25 MG tablet TAKE 1 TABLET BY MOUTH EVERY DAY IN THE MORNING      butalbital-aspirin-caffeine (FIORINAL) -40 MG capsule Take 1 capsule by mouth every 4 hours as needed for Headaches for up to 4 days. 15 capsule 0    fluticasone (FLONASE) 50 MCG/ACT nasal spray 2 sprays by Each Nostril route daily 1 Bottle 0    insulin glargine (LANTUS SOLOSTAR) 100 UNIT/ML injection pen Inject 25 Units into the skin every morning (Patient taking differently: Inject 26 Units into the skin every morning ) 1 pen 1    Liraglutide (VICTOZA) 18 MG/3ML SOPN SC injection Inject 1.8 mg into the skin daily 3 pen 5    pantoprazole (PROTONIX) 40 MG tablet Take 1 tablet by mouth every morning (before breakfast) 30 tablet 3    nitroGLYCERIN (NITROSTAT) 0.4 MG SL tablet up to max of 3 total doses.  If no relief after 1 dose, call 911. 25 tablet 3    Insulin Pen Needle (UNIFINE PENTIPS) 32G X 4 MM MISC USE AS DIRECTED 3 TIMES A  each 6    ticagrelor (BRILINTA) 90 MG TABS tablet Take 1 tablet by mouth 2 times daily 60 tablet 11    simvastatin (ZOCOR) 20 MG tablet Take 20 mg by mouth nightly      cetirizine (ZYRTEC) 10 MG tablet Take 10 mg by mouth daily      lisinopril (PRINIVIL;ZESTRIL) 10 MG tablet Take 10 mg by mouth daily      Empagliflozin-metFORMIN HCl 12.5-1000 MG TABS Take 2 tablets by mouth daily 60 tablet 5    Blood Pressure Monitor GINA Check BP at home once or twice a day 1 Device 0    Insulin Syringe-Needle U-100 31G X 5/16\" 0.3 ML MISC USE 3 TIMES A DAY BEFORE MEALS 90 each 7    calcium carbonate-vitamin D (CALTRATE) 600-400 MG-UNIT TABS per tab       letrozole (FEMARA) 2.5 MG tablet TAKE 1 TABLET BY MOUTH EVERY DAY  3    glucagon 1 MG injection Inject 1 mg into the muscle See Admin Instructions Follow package directions for low blood sugar. 1 kit 0    clonazePAM (KLONOPIN) 1 MG tablet Take 1 mg by mouth as needed. Marty Cam paliperidone (INVEGA) 9 MG extended release tablet Take 9 mg by mouth every morning       aspirin 81 MG tablet Take 81 mg by mouth daily      gabapentin (NEURONTIN) 600 MG tablet Take 600 mg by mouth 3 times daily      oxyCODONE-acetaminophen (PERCOCET) 5-325 MG per tablet Take 1 tablet by mouth every 6 hours as needed for Pain. Marty Cam traZODone (DESYREL) 100 MG tablet Take 100 mg by mouth nightly       Allergies   Allergen Reactions    Morphine Anaphylaxis and Hives     feels like throat is closing    Penicillins Hives and Swelling    Codeine Hives and Rash    Penicillin G Rash       REVIEW OF SYSTEMS  10 systems reviewed, pertinent positives per HPI otherwise noted to be negative. PHYSICAL EXAM  BP (!) 101/46   Pulse 71   Temp 98 °F (36.7 °C) (Oral)   Resp 18   SpO2 100%   GENERAL APPEARANCE: Awake and alert. Cooperative. No acute distress. HEAD: Normocephalic. Atraumatic. EYES: PERRL. EOM's grossly intact. ENT: Mucous membranes are moist.   NECK: Supple. HEART: RRR.    CHEST/LUNGS: Chest atraumatic, nontender, respirations unlabored. CTAB. Good air exchange. Speaking comfortably in full sentences. BACK: No midline spinal tenderness or step-off. ABDOMEN: Soft. Non-distended. Non-tender. No guarding or rebound. Normal bowel sounds. EXTREMITIES: No peripheral edema. Moves all extremities equally. All extremities neurovascularly intact. SKIN: Warm and dry. No acute rashes. NEUROLOGICAL: Alert and oriented. CN 2-12 intact, No gross facial drooping. Strength 5/5, sensation intact. Normal coordination. PSYCHIATRIC: Normal mood and affect. LABS  I have reviewed all labs for this visit.    Results for orders placed or performed during the hospital encounter of 01/07/21   CBC Auto Differential   Result Value Ref Range    WBC 5.5 4.0 - 11.0 K/uL    RBC 3.51 (L) 4.00 - 5.20 M/uL    Hemoglobin 9.8 (L) 12.0 - 16.0 g/dL    Hematocrit 32.9 (L) 36.0 - 48.0 %    MCV 93.9 80.0 - 100.0 fL    MCH 28.0 26.0 - 34.0 pg    MCHC 29.8 (L) 31.0 - 36.0 g/dL    RDW 14.8 12.4 - 15.4 %    Platelets 251 410 - 165 K/uL    MPV 10.1 5.0 - 10.5 fL    Neutrophils % 69.4 %    Lymphocytes % 22.7 %    Monocytes % 7.1 %    Eosinophils % 0.5 %    Basophils % 0.3 %    Neutrophils Absolute 3.8 1.7 - 7.7 K/uL    Lymphocytes Absolute 1.3 1.0 - 5.1 K/uL    Monocytes Absolute 0.4 0.0 - 1.3 K/uL    Eosinophils Absolute 0.0 0.0 - 0.6 K/uL    Basophils Absolute 0.0 0.0 - 0.2 K/uL   Comprehensive Metabolic Panel w/ Reflex to MG   Result Value Ref Range    Sodium 129 (L) 136 - 145 mmol/L    Potassium reflex Magnesium 4.5 3.5 - 5.1 mmol/L    Chloride 93 (L) 99 - 110 mmol/L    CO2 20 (L) 21 - 32 mmol/L    Anion Gap 16 3 - 16    Glucose 621 (HH) 70 - 99 mg/dL    BUN 21 (H) 7 - 20 mg/dL    CREATININE 1.1 0.6 - 1.2 mg/dL    GFR Non-African American 50 (A) >60    GFR African American >60 >60    Calcium 9.0 8.3 - 10.6 mg/dL    Total Protein 8.2 6.4 - 8.2 g/dL    Alb 3.5 3.4 - 5.0 g/dL    Albumin/Globulin Ratio 0.7 (L) 1.1 - 2.2    Total Bilirubin 0.3 0.0 - 1.0 mg/dL    Alkaline Phosphatase 316 (H) 40 - 129 U/L    ALT 35 10 - 40 U/L    AST 26 15 - 37 U/L    Globulin 4.7 g/dL   Urinalysis Reflex to Culture    Specimen: Urine, clean catch   Result Value Ref Range    Color, UA Straw Straw/Yellow    Clarity, UA Clear Clear    Glucose, Ur >=1000 (A) Negative mg/dL    Bilirubin Urine Negative Negative    Ketones, Urine Negative Negative mg/dL    Specific Gravity, UA <=1.005 1.005 - 1.030    Blood, Urine Negative Negative    pH, UA 5.0 5.0 - 8.0    Protein, UA Negative Negative mg/dL    Urobilinogen, Urine 0.2 <2.0 E.U./dL    Nitrite, Urine Negative Negative    Leukocyte Esterase, Urine Negative Negative    Microscopic Examination Not Indicated     Urine Type NotGiven     Urine Reflex to Culture Not Indicated    Magnesium   Result Value Ref Range    Magnesium 2.00 1.80 - 2.40 mg/dL   Phosphorus   Result Value Ref Range    Phosphorus 3.3 2.5 - 4.9 mg/dL   Beta-Hydroxybutyrate   Result Value Ref Range    Beta-Hydroxybutyrate 1.39 (H) 0.00 - 0.27 mmol/L   POCT Glucose   Result Value Ref Range    POC Glucose >600 (AA) 70 - 99 mg/dl    Performed on ACCU-CHEK    POCT Glucose   Result Value Ref Range    POC Glucose 394 (H) 70 - 99 mg/dl    Performed on ACCU-CHEK        ED COURSE/MDM  Patient seen and evaluated. Patient presented with hyperglycemia we did give her insulin here her sugar did come down significantly and she felt much better. I do think the patient has been having some issues with noncompliance with her insulin at home. And I do think this was likely the cause of her symptoms that brought her in here today. I do not think she needs admission or any further evaluation here I do think she is safe to be discharged home.    I estimate there is LOW risk for ACUTE CORONARY SYNDROME, INTRACRANIAL HEMORRHAGE, MALIGNANT DYSRHYTHMIA, MENINGITIS, PNEUMONIA, PULMONARY EMBOLISM, SEPSIS, SUBARACHNOID HEMORRHAGE, SUBDURAL HEMATOMA, STROKE, or URINARY TRACT INFECTION, thus I consider the discharge disposition reasonable. Rosario Tucker and JORGE ALBERTO have discussed the diagnosis and risks, and we agree with discharging home to follow-up with their primary doctor. We also discussed returning to the Emergency Department immediately if new or worsening symptoms occur. We have discussed the symptoms which are most concerning (e.g., changing or worsening pain, weakness, vomiting, fever) that necessitate immediate return. All diagnostic tests reviewed and results discussed with patient. Plan of care discussed with patient. Patient in agreement with plan. Discharge Medication List as of 1/7/2021 10:41 PM      START taking these medications    Details   ondansetron (ZOFRAN ODT) 4 MG disintegrating tablet Place 1 tablet under the tongue every 8 hours as needed for Nausea or Vomiting, Disp-10 tablet, R-0Print           CLINICAL IMPRESSION  1. Hyperglycemia        Blood pressure (!) 101/46, pulse 71, temperature 98 °F (36.7 °C), temperature source Oral, resp. rate 18, SpO2 100 %, not currently breastfeeding. DISPOSITION  Rosario Tucker was discharged to home in stable condition. This chart was generated in part by using Dragon Dictation system and may contain errors related to that system including errors in grammar, punctuation, and spelling, as well as words and phrases that may be inappropriate. When dictating, effort is made to correct spelling/grammar errors. If there are any questions or concerns please feel free to contact the dictating provider for clarification.      Summer Rothman DO  ATTENDING, [address] [facility], DO  01/09/21 8202

## 2021-01-26 ENCOUNTER — APPOINTMENT (OUTPATIENT)
Dept: CT IMAGING | Age: 61
DRG: 951 | End: 2021-01-26
Payer: MEDICAID

## 2021-01-26 ENCOUNTER — APPOINTMENT (OUTPATIENT)
Dept: GENERAL RADIOLOGY | Age: 61
DRG: 951 | End: 2021-01-26
Payer: MEDICAID

## 2021-01-26 ENCOUNTER — HOSPITAL ENCOUNTER (INPATIENT)
Age: 61
LOS: 10 days | Discharge: NURSING, REHAB OR LONG-TERM CARE | DRG: 951 | End: 2021-02-05
Attending: EMERGENCY MEDICINE | Admitting: INTERNAL MEDICINE
Payer: MEDICAID

## 2021-01-26 DIAGNOSIS — N30.01 ACUTE CYSTITIS WITH HEMATURIA: Primary | ICD-10-CM

## 2021-01-26 DIAGNOSIS — Z85.3 HISTORY OF BREAST CANCER: ICD-10-CM

## 2021-01-26 DIAGNOSIS — R53.1 GENERALIZED WEAKNESS: ICD-10-CM

## 2021-01-26 DIAGNOSIS — G89.4 CHRONIC PAIN DISORDER: ICD-10-CM

## 2021-01-26 DIAGNOSIS — R29.6 FREQUENT FALLS: ICD-10-CM

## 2021-01-26 DIAGNOSIS — S09.90XA CLOSED HEAD INJURY, INITIAL ENCOUNTER: ICD-10-CM

## 2021-01-26 LAB
A/G RATIO: 0.8 (ref 1.1–2.2)
ALBUMIN SERPL-MCNC: 3.3 G/DL (ref 3.4–5)
ALP BLD-CCNC: 321 U/L (ref 40–129)
ALT SERPL-CCNC: 84 U/L (ref 10–40)
ANION GAP SERPL CALCULATED.3IONS-SCNC: 12 MMOL/L (ref 3–16)
AST SERPL-CCNC: 34 U/L (ref 15–37)
BASOPHILS ABSOLUTE: 0 K/UL (ref 0–0.2)
BASOPHILS RELATIVE PERCENT: 0.6 %
BILIRUB SERPL-MCNC: <0.2 MG/DL (ref 0–1)
BILIRUBIN URINE: NEGATIVE
BLOOD, URINE: ABNORMAL
BUN BLDV-MCNC: 15 MG/DL (ref 7–20)
CALCIUM SERPL-MCNC: 9.7 MG/DL (ref 8.3–10.6)
CHLORIDE BLD-SCNC: 101 MMOL/L (ref 99–110)
CLARITY: CLEAR
CO2: 22 MMOL/L (ref 21–32)
COLOR: YELLOW
CREAT SERPL-MCNC: 1 MG/DL (ref 0.6–1.2)
EOSINOPHILS ABSOLUTE: 0 K/UL (ref 0–0.6)
EOSINOPHILS RELATIVE PERCENT: 0.7 %
EPITHELIAL CELLS, UA: ABNORMAL /HPF (ref 0–5)
GFR AFRICAN AMERICAN: >60
GFR NON-AFRICAN AMERICAN: 56
GLOBULIN: 4 G/DL
GLUCOSE BLD-MCNC: 324 MG/DL (ref 70–99)
GLUCOSE BLD-MCNC: 346 MG/DL (ref 70–99)
GLUCOSE BLD-MCNC: 438 MG/DL (ref 70–99)
GLUCOSE BLD-MCNC: 452 MG/DL (ref 70–99)
GLUCOSE URINE: >=1000 MG/DL
HCT VFR BLD CALC: 32.5 % (ref 36–48)
HEMOGLOBIN: 10.1 G/DL (ref 12–16)
KETONES, URINE: NEGATIVE MG/DL
LEUKOCYTE ESTERASE, URINE: NEGATIVE
LYMPHOCYTES ABSOLUTE: 1.4 K/UL (ref 1–5.1)
LYMPHOCYTES RELATIVE PERCENT: 24.7 %
MCH RBC QN AUTO: 28 PG (ref 26–34)
MCHC RBC AUTO-ENTMCNC: 31 G/DL (ref 31–36)
MCV RBC AUTO: 90.2 FL (ref 80–100)
MICROSCOPIC EXAMINATION: YES
MONOCYTES ABSOLUTE: 0.4 K/UL (ref 0–1.3)
MONOCYTES RELATIVE PERCENT: 6.7 %
NEUTROPHILS ABSOLUTE: 3.8 K/UL (ref 1.7–7.7)
NEUTROPHILS RELATIVE PERCENT: 67.3 %
NITRITE, URINE: POSITIVE
PDW BLD-RTO: 16.3 % (ref 12.4–15.4)
PERFORMED ON: ABNORMAL
PH UA: 5.5 (ref 5–8)
PLATELET # BLD: 160 K/UL (ref 135–450)
PMV BLD AUTO: 10.8 FL (ref 5–10.5)
POTASSIUM REFLEX MAGNESIUM: 4 MMOL/L (ref 3.5–5.1)
PROTEIN UA: NEGATIVE MG/DL
RBC # BLD: 3.6 M/UL (ref 4–5.2)
RBC UA: ABNORMAL /HPF (ref 0–4)
SODIUM BLD-SCNC: 135 MMOL/L (ref 136–145)
SPECIFIC GRAVITY UA: 1.01 (ref 1–1.03)
TOTAL PROTEIN: 7.3 G/DL (ref 6.4–8.2)
TROPONIN: <0.01 NG/ML
URINE REFLEX TO CULTURE: ABNORMAL
URINE TYPE: ABNORMAL
UROBILINOGEN, URINE: 0.2 E.U./DL
WBC # BLD: 5.7 K/UL (ref 4–11)
WBC UA: ABNORMAL /HPF (ref 0–5)
YEAST: PRESENT /HPF

## 2021-01-26 PROCEDURE — 87086 URINE CULTURE/COLONY COUNT: CPT

## 2021-01-26 PROCEDURE — 87186 SC STD MICRODIL/AGAR DIL: CPT

## 2021-01-26 PROCEDURE — 99284 EMERGENCY DEPT VISIT MOD MDM: CPT

## 2021-01-26 PROCEDURE — 73620 X-RAY EXAM OF FOOT: CPT

## 2021-01-26 PROCEDURE — 1200000000 HC SEMI PRIVATE

## 2021-01-26 PROCEDURE — 71046 X-RAY EXAM CHEST 2 VIEWS: CPT

## 2021-01-26 PROCEDURE — 2580000003 HC RX 258: Performed by: NURSE PRACTITIONER

## 2021-01-26 PROCEDURE — 72125 CT NECK SPINE W/O DYE: CPT

## 2021-01-26 PROCEDURE — 70450 CT HEAD/BRAIN W/O DYE: CPT

## 2021-01-26 PROCEDURE — 85025 COMPLETE CBC W/AUTO DIFF WBC: CPT

## 2021-01-26 PROCEDURE — 87077 CULTURE AEROBIC IDENTIFY: CPT

## 2021-01-26 PROCEDURE — 84484 ASSAY OF TROPONIN QUANT: CPT

## 2021-01-26 PROCEDURE — 93005 ELECTROCARDIOGRAM TRACING: CPT | Performed by: NURSE PRACTITIONER

## 2021-01-26 PROCEDURE — 80053 COMPREHEN METABOLIC PANEL: CPT

## 2021-01-26 PROCEDURE — 6370000000 HC RX 637 (ALT 250 FOR IP): Performed by: NURSE PRACTITIONER

## 2021-01-26 PROCEDURE — 96366 THER/PROPH/DIAG IV INF ADDON: CPT

## 2021-01-26 PROCEDURE — 6360000002 HC RX W HCPCS: Performed by: NURSE PRACTITIONER

## 2021-01-26 PROCEDURE — 96365 THER/PROPH/DIAG IV INF INIT: CPT

## 2021-01-26 PROCEDURE — 81001 URINALYSIS AUTO W/SCOPE: CPT

## 2021-01-26 RX ORDER — ASPIRIN 81 MG/1
81 TABLET, CHEWABLE ORAL DAILY
Status: DISCONTINUED | OUTPATIENT
Start: 2021-01-27 | End: 2021-02-05 | Stop reason: HOSPADM

## 2021-01-26 RX ORDER — ONDANSETRON 2 MG/ML
4 INJECTION INTRAMUSCULAR; INTRAVENOUS EVERY 6 HOURS PRN
Status: DISCONTINUED | OUTPATIENT
Start: 2021-01-26 | End: 2021-02-05 | Stop reason: HOSPADM

## 2021-01-26 RX ORDER — PALIPERIDONE 3 MG/1
9 TABLET, EXTENDED RELEASE ORAL EVERY MORNING
Status: DISCONTINUED | OUTPATIENT
Start: 2021-01-27 | End: 2021-01-27

## 2021-01-26 RX ORDER — CETIRIZINE HYDROCHLORIDE 10 MG/1
10 TABLET ORAL DAILY
Status: DISCONTINUED | OUTPATIENT
Start: 2021-01-27 | End: 2021-02-05 | Stop reason: HOSPADM

## 2021-01-26 RX ORDER — CIPROFLOXACIN 2 MG/ML
400 INJECTION, SOLUTION INTRAVENOUS ONCE
Status: COMPLETED | OUTPATIENT
Start: 2021-01-26 | End: 2021-01-26

## 2021-01-26 RX ORDER — DEXTROSE MONOHYDRATE 50 MG/ML
100 INJECTION, SOLUTION INTRAVENOUS PRN
Status: DISCONTINUED | OUTPATIENT
Start: 2021-01-26 | End: 2021-02-05 | Stop reason: HOSPADM

## 2021-01-26 RX ORDER — SODIUM CHLORIDE 0.9 % (FLUSH) 0.9 %
10 SYRINGE (ML) INJECTION PRN
Status: DISCONTINUED | OUTPATIENT
Start: 2021-01-26 | End: 2021-02-05 | Stop reason: HOSPADM

## 2021-01-26 RX ORDER — LISINOPRIL 10 MG/1
10 TABLET ORAL DAILY
Status: DISCONTINUED | OUTPATIENT
Start: 2021-01-27 | End: 2021-02-03

## 2021-01-26 RX ORDER — SODIUM CHLORIDE 9 MG/ML
INJECTION, SOLUTION INTRAVENOUS CONTINUOUS
Status: DISCONTINUED | OUTPATIENT
Start: 2021-01-26 | End: 2021-01-30

## 2021-01-26 RX ORDER — PROMETHAZINE HYDROCHLORIDE 25 MG/1
12.5 TABLET ORAL EVERY 6 HOURS PRN
Status: DISCONTINUED | OUTPATIENT
Start: 2021-01-26 | End: 2021-02-05 | Stop reason: HOSPADM

## 2021-01-26 RX ORDER — PANTOPRAZOLE SODIUM 40 MG/1
40 TABLET, DELAYED RELEASE ORAL
Status: DISCONTINUED | OUTPATIENT
Start: 2021-01-27 | End: 2021-02-05 | Stop reason: HOSPADM

## 2021-01-26 RX ORDER — ACETAMINOPHEN 650 MG/1
650 SUPPOSITORY RECTAL EVERY 6 HOURS PRN
Status: DISCONTINUED | OUTPATIENT
Start: 2021-01-26 | End: 2021-02-05 | Stop reason: HOSPADM

## 2021-01-26 RX ORDER — SODIUM CHLORIDE 0.9 % (FLUSH) 0.9 %
10 SYRINGE (ML) INJECTION EVERY 12 HOURS SCHEDULED
Status: DISCONTINUED | OUTPATIENT
Start: 2021-01-26 | End: 2021-02-05 | Stop reason: HOSPADM

## 2021-01-26 RX ORDER — FLUTICASONE PROPIONATE 50 MCG
2 SPRAY, SUSPENSION (ML) NASAL DAILY
Status: DISCONTINUED | OUTPATIENT
Start: 2021-01-27 | End: 2021-02-05 | Stop reason: HOSPADM

## 2021-01-26 RX ORDER — LETROZOLE 2.5 MG/1
2.5 TABLET, FILM COATED ORAL DAILY
Status: DISCONTINUED | OUTPATIENT
Start: 2021-01-27 | End: 2021-02-01

## 2021-01-26 RX ORDER — SULFAMETHOXAZOLE AND TRIMETHOPRIM 800; 160 MG/1; MG/1
1 TABLET ORAL EVERY 12 HOURS SCHEDULED
Status: DISCONTINUED | OUTPATIENT
Start: 2021-01-27 | End: 2021-01-31

## 2021-01-26 RX ORDER — DEXTROSE MONOHYDRATE 25 G/50ML
12.5 INJECTION, SOLUTION INTRAVENOUS PRN
Status: DISCONTINUED | OUTPATIENT
Start: 2021-01-26 | End: 2021-02-05 | Stop reason: HOSPADM

## 2021-01-26 RX ORDER — NICOTINE POLACRILEX 4 MG
15 LOZENGE BUCCAL PRN
Status: DISCONTINUED | OUTPATIENT
Start: 2021-01-26 | End: 2021-02-05 | Stop reason: HOSPADM

## 2021-01-26 RX ORDER — ATORVASTATIN CALCIUM 10 MG/1
20 TABLET, FILM COATED ORAL DAILY
Status: DISCONTINUED | OUTPATIENT
Start: 2021-01-27 | End: 2021-02-05 | Stop reason: HOSPADM

## 2021-01-26 RX ORDER — INSULIN GLARGINE 100 [IU]/ML
26 INJECTION, SOLUTION SUBCUTANEOUS EVERY MORNING
Status: DISCONTINUED | OUTPATIENT
Start: 2021-01-27 | End: 2021-01-30

## 2021-01-26 RX ORDER — GABAPENTIN 300 MG/1
600 CAPSULE ORAL 3 TIMES DAILY
Status: DISCONTINUED | OUTPATIENT
Start: 2021-01-26 | End: 2021-02-05 | Stop reason: HOSPADM

## 2021-01-26 RX ORDER — ACETAMINOPHEN 325 MG/1
650 TABLET ORAL EVERY 6 HOURS PRN
Status: DISCONTINUED | OUTPATIENT
Start: 2021-01-26 | End: 2021-02-05 | Stop reason: HOSPADM

## 2021-01-26 RX ADMIN — GABAPENTIN 600 MG: 300 CAPSULE ORAL at 22:41

## 2021-01-26 RX ADMIN — TICAGRELOR 90 MG: 90 TABLET ORAL at 22:41

## 2021-01-26 RX ADMIN — SODIUM CHLORIDE, PRESERVATIVE FREE 10 ML: 5 INJECTION INTRAVENOUS at 22:42

## 2021-01-26 RX ADMIN — CIPROFLOXACIN 400 MG: 2 INJECTION, SOLUTION INTRAVENOUS at 18:53

## 2021-01-26 RX ADMIN — SODIUM CHLORIDE: 9 INJECTION, SOLUTION INTRAVENOUS at 22:43

## 2021-01-26 RX ADMIN — INSULIN LISPRO 4 UNITS: 100 INJECTION, SOLUTION INTRAVENOUS; SUBCUTANEOUS at 22:42

## 2021-01-26 ASSESSMENT — ENCOUNTER SYMPTOMS
NAUSEA: 0
BACK PAIN: 0
COUGH: 0
COLOR CHANGE: 0
DIARRHEA: 0
ABDOMINAL PAIN: 0
VOMITING: 0
SHORTNESS OF BREATH: 0

## 2021-01-26 ASSESSMENT — PAIN SCALES - GENERAL
PAINLEVEL_OUTOF10: 8
PAINLEVEL_OUTOF10: 0

## 2021-01-26 ASSESSMENT — PAIN DESCRIPTION - ORIENTATION: ORIENTATION: RIGHT

## 2021-01-26 NOTE — ED PROVIDER NOTES
I independently performed a history and physical on Santiago Mishra. All diagnostic, treatment, and disposition decisions were made by myself in conjunction with the advanced practice provider. For further details of [facility] emergency department encounter, please see Ana Silva NP's documentation. Patient presents to the emergency department complaining of fatigue and multiple falls. Patient states that she has been eating within normal limits but has lost a significant amount of weight over the last 6 months to year. Patient denies fevers, chills, sweats. No cough, or congestion. Patient states that today she has had 5+ falls and admits to striking her head multiple times. She denies lightheadedness, palpitations, or chest pain. No focal neurologic deficit such as muscle weakness, paresthesias, or facial drooping have been noted. Physical exam: General: No acute distress. Head: Normocephalic/atraumatic. Heart: Regular rate and rhythm. Normal S1-S2. Lungs: Clear to auscultation bilaterally. No wheezes, rales, rhonchi. Abdomen: Soft. Nontender nondistended. No rebound, guarding. Extremities: Within normal limits. Spine: No significant tenderness of the cervical, thoracic, or lumbar spine. No step-offs, or crepitus. EKG: Normal sinus rhythm with a rate of 60. Normal axis. Normal intervals and durations. No ST or T wave changes appreciated. Normal sinus rhythm has replaced sinus bradycardia from previous EKG on 7/11/2020.    1. Acute cystitis with hematuria    2. Frequent falls    3. Closed head injury, initial encounter    4.  Generalized weakness           Semaj Cade DO  01/26/21 2030

## 2021-01-26 NOTE — ED NOTES
Ambulated patient to restroom about 50 feet with a walker and assistance from this tech. Patient was able to slowly ambulate. Patient complained about weakness and foot pain. Patient placed back in bed with all wheels locked 2/2 side rails up, on monitor and call light in hand.       Param Diop  01/26/21 [id number]

## 2021-01-26 NOTE — ED PROVIDER NOTES
**ADVANCED PRACTICE PROVIDER, I HAVE EVALUATED THIS [facility]  ED  EMERGENCY DEPARTMENT ENCOUNTER      Pt Name: Gabriel SHEIKH:[id number]  [place] 1960  Date of evaluation: 1/26/2021  Provider: SARTHAK Acuna CNP      Chief Complaint:    Chief Complaint   Patient presents with   Janelle Ousmanee    Fatigue    Foot Injury     right foot        Nursing Notes, Past Medical Hx, Past Surgical Hx, Social Hx, Allergies, and Family Hx were all reviewed and agreed with or any disagreements were addressed in the HPI.    HPI:  (Location, Duration, Timing, Severity, Quality, Assoc Sx, Context, Modifying factors)  This is a  63 y.o. female who presents emergency department with multiple falls since around 8:00 this morning, she states she is fallen approximately 15 times a day, has hit her head a few times, denies any loss of conscious. She states that when she gets up she is having a hard time just walking. She denies any lightheadedness or dizziness. No chest pain pleuritic chest pain or shortness of breath. No abdominal pain, no nausea vomiting or diarrhea. She denies any urinary symptoms. Denies any new medications. She does complain of right foot and right ankle pain status post fall that happened earlier today. She is not take any medicine for this ankle or foot pain. She denies any any additional complaints, no additional aggravating or relieving factors. Patient presents awake, alert and in no acute distress or toxic appearance.     PastMedical/Surgical History:      Diagnosis Date    Abnormal brain MRI 7/20/2017    Partially empty sella and minimal chronic small vessel ischemic disease    Acute bilateral low back pain without sciatica 11/2/2016    SHARRON (acute kidney injury) ([facility] [address] 7/5/2017    Arthritis     back    Bipolar disorder ([facility] [address] 10/18/2008    CAD (coronary artery disease)     stent placed 6/8/20    Cancer ([facility] [address] 2015    bilateral breast:s/p lumpectomy/radiation:under care care of breast specialist:Dr. Boone     Carotid stenosis, bilateral:<50%:per US 7/2016 7/15/2016    Carpal tunnel syndrome 10/18/2008    Cervical cancer screening 2014    Nml per pt'.  Coronary artery disease of native artery of native heart with stable angina pectoris ([facility] [address] 6/9/2020    DDD (degenerative disc disease), lumbar 7/18/2018    Depression     under care of pschiatrist:Dr. 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  Scoliosis     Spondylosis of lumbar region without myelopathy or radiculopathy 3/10/2017    Transient cerebral ischemia 07/15/2016    TIA:7/10/16    Unspecified cerebral artery occlusion with cerebral infarction     TIA         Procedure Laterality Date    BREAST LUMPECTOMY  2015    Bilateral:breast cancer    CARDIAC CATHETERIZATION  06/08/2020    Dr. Allison Steele), DAYANA to Diag 1    HYSTERECTOMY      Benign:no cervical cancer per pt'    KIDNEY REMOVAL      right    OTHER SURGICAL HISTORY Right     orif right ankle    TUBAL LIGATION         Medications:  Previous Medications    ASPIRIN 81 MG TABLET    Take 81 mg by mouth daily    BLOOD PRESSURE MONITOR GINA    Check BP at home once or twice a day    BUTALBITAL-ASPIRIN-CAFFEINE (FIORINAL) -40 MG CAPSULE    Take 1 capsule by mouth every 4 hours as needed for Headaches for up to 4 days.     CALCIUM CARBONATE-VITAMIN D (CALTRATE) 600-400 MG-UNIT TABS PER TAB        CETIRIZINE (ZYRTEC) 10 MG TABLET    Take 10 mg by mouth daily    CLONAZEPAM (KLONOPIN) 1 MG TABLET    Take 1 mg by mouth as needed. Kirit Pulido EMPAGLIFLOZIN-METFORMIN HCL 12.5-1000 MG TABS    Take 2 tablets by mouth daily    FLUTICASONE (FLONASE) 50 MCG/ACT NASAL SPRAY    2 sprays by Each Nostril route daily    GABAPENTIN (NEURONTIN) 600 MG TABLET    Take 600 mg by mouth 3 times daily    GLUCAGON 1 MG INJECTION    Inject 1 mg into the muscle See Admin Instructions Follow package directions for low blood sugar. INSULIN GLARGINE (LANTUS SOLOSTAR) 100 UNIT/ML INJECTION PEN    Inject 25 Units into the skin every morning    INSULIN PEN NEEDLE (UNIFINE PENTIPS) 32G X 4 MM MISC    USE AS DIRECTED 3 TIMES A DAY    INSULIN SYRINGE-NEEDLE U-100 31G X 5/16\" 0.3 ML MISC    USE 3 TIMES A DAY BEFORE MEALS    JARDIANCE 25 MG TABLET    TAKE 1 TABLET BY MOUTH EVERY DAY IN THE MORNING    LETROZOLE (FEMARA) 2.5 MG TABLET    TAKE 1 TABLET BY MOUTH EVERY DAY    LIRAGLUTIDE (VICTOZA) 18 MG/3ML SOPN SC INJECTION    Inject 1.8 mg into the skin daily    LISINOPRIL (PRINIVIL;ZESTRIL) 10 MG TABLET    Take 10 mg by mouth daily    NITROGLYCERIN (NITROSTAT) 0.4 MG SL TABLET    up to max of 3 total doses. If no relief after 1 dose, call 911. ONDANSETRON (ZOFRAN ODT) 4 MG DISINTEGRATING TABLET    Place 1 tablet under the tongue every 8 hours as needed for Nausea or Vomiting    OXYCODONE-ACETAMINOPHEN (PERCOCET) 5-325 MG PER TABLET    Take 1 tablet by mouth every 6 hours as needed for Pain. Kirit Pulido     PALIPERIDONE (INVEGA) 9 MG EXTENDED RELEASE TABLET    Take 9 mg by mouth every morning     PANTOPRAZOLE (PROTONIX) 40 MG TABLET    Take 1 tablet by mouth every morning (before breakfast)    SIMVASTATIN (ZOCOR) 20 MG TABLET    Take 20 mg by mouth nightly    TICAGRELOR (BRILINTA) 90 MG TABS TABLET    Take 1 tablet by mouth 2 times daily    TRAZODONE (DESYREL) 100 MG TABLET    Take 100 mg by mouth nightly    TRUE METRIX BLOOD GLUCOSE TEST STRIP    USE AS DIRECTED TO TEST 4 TIMES A DAY         Review of Systems:  Review of Systems   Constitutional: Negative for chills and fever. HENT: Negative for congestion. Respiratory: Negative for cough and shortness of breath. Cardiovascular: Negative for chest pain. Gastrointestinal: Negative for abdominal pain, diarrhea, nausea and vomiting. Genitourinary: Negative for difficulty urinating, dysuria and frequency. Musculoskeletal: Positive for arthralgias. Negative for back pain. Patient complains of right foot pain status post fall, she is fallen over 15 times a day. Denies any head neck or back pain associated with the fall. Skin: Negative for color change. Neurological: Positive for weakness. Negative for numbness and headaches. Although the patient states she is hit her head multiple times today with her fall she denies any severe headache neck pain or neck stiffness. She reports mechanical falls, states she gets out of bed walking just cannot because she is so weak. She denies any lightheadedness, dizziness, no change in vision. Positives and Pertinent negatives as per HPI. Except as noted above in the ROS, problem specific ROS was completed and is negative. Physical Exam:  Physical Exam  Vitals signs and nursing note reviewed. Constitutional:       Appearance: She is well-developed. She is not diaphoretic. HENT:      Head: Normocephalic. Right Ear: External ear normal.      Left Ear: External ear normal.   Eyes:      General: No scleral icterus. Right eye: No discharge. Left eye: No discharge. Pupils: Pupils are equal, round, and reactive to light. Comments: Pupils are 3 mm round and reactive, normal extraocular movement, no visible nystagmus. Neck:      Musculoskeletal: Normal range of motion and neck supple. Cardiovascular:      Rate and Rhythm: Normal rate.    Pulmonary:      Effort: Pulmonary effort is normal. No respiratory distress. Breath sounds: Normal breath sounds. Abdominal:      Palpations: Abdomen is soft. Tenderness: There is no abdominal tenderness. Musculoskeletal: Normal range of motion. Comments: She complains of right foot pain, has no obvious deformity or break in skin integrity, reproducible tenderness to the proximal aspect of the fourth and fifth metatarsals. Skin:     General: Skin is warm. Coloration: Skin is not pale. Neurological:      General: No focal deficit present. Mental Status: She is alert and oriented to person, place, and time. GCS: GCS eye subscore is 4. GCS verbal subscore is 5. GCS motor subscore is 6. Cranial Nerves: Cranial nerves are intact. Sensory: Sensation is intact. Comments: Patient is awake, alert following all commands correctly, neurologically intact no focal deficits. No numbness feeling or paresthesias. No saddle anesthesia, no loss of bowel bladder control urinary tension. She is moving her arms and legs while resting in stretcher without difficulty, equal strength and power of 5\5. NIH of 0.    Psychiatric:         Behavior: Behavior normal.         MEDICAL DECISION MAKING    Vitals:    Vitals:    01/26/21 1630 01/26/21 1635 01/26/21 1645 01/26/21 2030   BP: 138/60 (!) 148/65 138/64 (!) 120/56   Pulse: 62 73 62 63   Resp: 16 14 15 12   Temp: 98.7 °F (37.1 °C) 98.7 °F (37.1 °C) 98.7 °F (37.1 °C) 98.7 °F (37.1 °C)   TempSrc:       SpO2: 100% 100% 100% 100%   Weight:       Height:           LABS:  Labs Reviewed   CBC WITH AUTO DIFFERENTIAL - Abnormal; Notable for the following components:       Result Value    RBC 3.60 (*)     Hemoglobin 10.1 (*)     Hematocrit 32.5 (*)     RDW 16.3 (*)     MPV 10.8 (*)     All other components within normal limits    Narrative:     Performed at:  [facility]) [address], [address] [place] [address] [employer]   Phone [phone]   COMPREHENSIVE METABOLIC PANEL W/ REFLEX TO MG FOR LOW K - Abnormal; Notable for the following components:    Sodium 135 (*)     Glucose 438 (*)     GFR Non- 56 (*)     Albumin 3.3 (*)     Albumin/Globulin Ratio 0.8 (*)     Alkaline Phosphatase 321 (*)     ALT 84 (*)     All other components within normal limits    Narrative:     Performed at:  [address], [facility] [employer]   Phone [phone]   URINE RT REFLEX TO CULTURE - Abnormal; Notable for the following components:    Glucose, Ur >=1000 (*)     Blood, Urine TRACE-LYSED (*)     Nitrite, Urine POSITIVE (*)     All other components within normal limits    Narrative:     Performed at:  [place] [address] [place]   Phone [phone]   MICROSCOPIC URINALYSIS - Abnormal; Notable for the following components:    WBC, UA 6-9 (*)     Yeast, UA Present (*)     All other components within normal limits    Narrative:     Performed at:  [place] [address] [place]   Phone [phone]   POCT GLUCOSE - Abnormal; Notable for the following components:    POC Glucose 452 (*)     All other components within normal limits    Narrative:     Performed at:  [address], [facility] [place]   Phone [phone]   TROPONIN    Narrative:     Performed at:  [address], [facility] [place]   Phone  of labs reviewed and werenegative at this time or not returned at the time of this note.     RADIOLOGY:   Non-plain film images such as CT, Ultrasound and MRI are read by the radiologist. Ellen AZUL APRN - CNP have directly visualized the radiologic plain film image(s) with the below findings:        Interpretation per the Radiologist below, if available at the time of this note:    CT CERVICAL SPINE WO CONTRAST   Final Result   No acute abnormality of the cervical spine. Focal spondylosis at C5-6. CT HEAD WO CONTRAST   Final Result   No acute intracranial abnormality. Opacification of the right maxillary sinus, suggesting sinusitis. XR FOOT RIGHT (2 VIEWS)   Final Result   Chronic findings are noted. No acute radiographic abnormality is appreciated. XR CHEST (2 VW)   Final Result   No acute cardiopulmonary disease. MEDICAL DECISION MAKING / ED COURSE:      PROCEDURES:   Procedures    None    Patient was given:  Medications   ciprofloxacin (CIPRO) IVPB 400 mg (400 mg Intravenous New Bag 1/26/21 9337)       Patient presents after multiple falls since around 8:00 this morning, she states she is fallen approximately 15 times a day, has hit her head a few times, denies any loss of conscious. She states that when she gets up she is having a hard time just walking. She denies any lightheadedness or dizziness. No chest pain pleuritic chest pain or shortness of breath. After evaluation and examination the patient I ordered IV access, blood work, chest x-ray, EKG, CT scans of the head neck. I also ordered orthostatic vital signs due to her weakness. EKG shows sinus rhythm rate of 60 bpm, no acute ST elevation. CT shows no sepsis or anemia requiring transfusion. Metabolic panel shows no significant electrolyte disturbances with the exception of a glucose of 438, liver functions are essentially normal with the exception of her alk phos being 321. Covid is negative. Urinalysis shows positive nitrates, glucose however, she is penicillin allergic, I ordered Cipro IV. CT the head shows no significant intracranial abnormality. CT cervical spine shows no acute abnormality in cervical spine, she does have focal spondylosis at C5 and C6. In regards to her right foot pain, there are chronic findings but no acute fracture or dislocation.   Chest x-ray shows no acute cardiopulmonary findings. ,  Nursing staff attempted to help the patient ambulate, she was very weak and unsteady, there is concern that the patient lives alone and is having a hard time taking care of her self due to all the frequent falls and now she has a UTI. I then paged the hospitalist on-call for admission. At 1838 I had a long conversation with both the patient's mother and nephew, we discussed the patient's case at length, they agreed with the plan that the patient should be admitted however the greatest concern is over the past 6 months she has had nearly a 100 pound weight loss. At 2037 I spoke with Alejandra Garcia, practitioner on-call with the hospitalist group, we discussed the patient's case at length and she agreed to accept the patient for admission. The patient tolerated their visit well. I evaluated the patient. The physician was available for consultation as needed. The patient and / or the family were informed of the results of any tests, a time was given to answer questions, a plan was proposed and they agreed with plan. Patient will be admitted to the hospital for further evaluation management of care. CLINICAL IMPRESSION:  1. Acute cystitis with hematuria    2. Frequent falls    3. Closed head injury, initial encounter    4. Generalized weakness        DISPOSITION        PATIENT REFERRED TO:  No follow-up provider specified.     DISCHARGE MEDICATIONS:  New Prescriptions    No medications on file       DISCONTINUED MEDICATIONS:  Discontinued Medications    No medications on file              (Please note the MDM and HPI sections of this note were completed with a voice recognition program.  Efforts were made to edit the dictations but occasionally words are mis-transcribed.)    Electronically signed, SARTHAK Kim CNP,          SARTHAK Kim CNP  01/26/21 2043

## 2021-01-27 LAB
ANION GAP SERPL CALCULATED.3IONS-SCNC: 10 MMOL/L (ref 3–16)
BASOPHILS ABSOLUTE: 0 K/UL (ref 0–0.2)
BASOPHILS RELATIVE PERCENT: 0.5 %
BUN BLDV-MCNC: 12 MG/DL (ref 7–20)
C DIFF TOXIN/ANTIGEN: NORMAL
CALCIUM SERPL-MCNC: 8.8 MG/DL (ref 8.3–10.6)
CHLORIDE BLD-SCNC: 107 MMOL/L (ref 99–110)
CO2: 23 MMOL/L (ref 21–32)
CREAT SERPL-MCNC: 0.9 MG/DL (ref 0.6–1.2)
EKG ATRIAL RATE: 60 BPM
EKG DIAGNOSIS: NORMAL
EKG P AXIS: 23 DEGREES
EKG P-R INTERVAL: 150 MS
EKG Q-T INTERVAL: 392 MS
EKG QRS DURATION: 74 MS
EKG QTC CALCULATION (BAZETT): 392 MS
EKG R AXIS: 8 DEGREES
EKG T AXIS: 46 DEGREES
EKG VENTRICULAR RATE: 60 BPM
EOSINOPHILS ABSOLUTE: 0 K/UL (ref 0–0.6)
EOSINOPHILS RELATIVE PERCENT: 0.7 %
ESTIMATED AVERAGE GLUCOSE: 349.4 MG/DL
GFR AFRICAN AMERICAN: >60
GFR NON-AFRICAN AMERICAN: >60
GLUCOSE BLD-MCNC: 156 MG/DL (ref 70–99)
GLUCOSE BLD-MCNC: 213 MG/DL (ref 70–99)
GLUCOSE BLD-MCNC: 231 MG/DL (ref 70–99)
GLUCOSE BLD-MCNC: 254 MG/DL (ref 70–99)
HBA1C MFR BLD: 13.8 %
HCT VFR BLD CALC: 29.6 % (ref 36–48)
HEMOGLOBIN: 9.2 G/DL (ref 12–16)
IRON SATURATION: 15 % (ref 15–50)
IRON: 37 UG/DL (ref 37–145)
LYMPHOCYTES ABSOLUTE: 2.2 K/UL (ref 1–5.1)
LYMPHOCYTES RELATIVE PERCENT: 35.2 %
MAGNESIUM: 2 MG/DL (ref 1.8–2.4)
MCH RBC QN AUTO: 27.9 PG (ref 26–34)
MCHC RBC AUTO-ENTMCNC: 31.2 G/DL (ref 31–36)
MCV RBC AUTO: 89.5 FL (ref 80–100)
MONOCYTES ABSOLUTE: 0.5 K/UL (ref 0–1.3)
MONOCYTES RELATIVE PERCENT: 7.8 %
NEUTROPHILS ABSOLUTE: 3.5 K/UL (ref 1.7–7.7)
NEUTROPHILS RELATIVE PERCENT: 55.8 %
PDW BLD-RTO: 16.1 % (ref 12.4–15.4)
PERFORMED ON: ABNORMAL
PLATELET # BLD: 145 K/UL (ref 135–450)
PMV BLD AUTO: 10.7 FL (ref 5–10.5)
POTASSIUM REFLEX MAGNESIUM: 3.5 MMOL/L (ref 3.5–5.1)
RBC # BLD: 3.31 M/UL (ref 4–5.2)
SODIUM BLD-SCNC: 140 MMOL/L (ref 136–145)
TOTAL IRON BINDING CAPACITY: 253 UG/DL (ref 260–445)
WBC # BLD: 6.3 K/UL (ref 4–11)

## 2021-01-27 PROCEDURE — 6370000000 HC RX 637 (ALT 250 FOR IP): Performed by: NURSE PRACTITIONER

## 2021-01-27 PROCEDURE — 2580000003 HC RX 258: Performed by: NURSE PRACTITIONER

## 2021-01-27 PROCEDURE — 36415 COLL VENOUS BLD VENIPUNCTURE: CPT

## 2021-01-27 PROCEDURE — 83550 IRON BINDING TEST: CPT

## 2021-01-27 PROCEDURE — 97530 THERAPEUTIC ACTIVITIES: CPT

## 2021-01-27 PROCEDURE — 97116 GAIT TRAINING THERAPY: CPT

## 2021-01-27 PROCEDURE — 87324 CLOSTRIDIUM AG IA: CPT

## 2021-01-27 PROCEDURE — 97161 PT EVAL LOW COMPLEX 20 MIN: CPT

## 2021-01-27 PROCEDURE — 6360000002 HC RX W HCPCS: Performed by: NURSE PRACTITIONER

## 2021-01-27 PROCEDURE — 80048 BASIC METABOLIC PNL TOTAL CA: CPT

## 2021-01-27 PROCEDURE — 83036 HEMOGLOBIN GLYCOSYLATED A1C: CPT

## 2021-01-27 PROCEDURE — 97166 OT EVAL MOD COMPLEX 45 MIN: CPT

## 2021-01-27 PROCEDURE — 83540 ASSAY OF IRON: CPT

## 2021-01-27 PROCEDURE — 85025 COMPLETE CBC W/AUTO DIFF WBC: CPT

## 2021-01-27 PROCEDURE — 1200000000 HC SEMI PRIVATE

## 2021-01-27 PROCEDURE — 6370000000 HC RX 637 (ALT 250 FOR IP): Performed by: INTERNAL MEDICINE

## 2021-01-27 PROCEDURE — 83735 ASSAY OF MAGNESIUM: CPT

## 2021-01-27 PROCEDURE — 87449 NOS EACH ORGANISM AG IA: CPT

## 2021-01-27 PROCEDURE — 97535 SELF CARE MNGMENT TRAINING: CPT

## 2021-01-27 PROCEDURE — 93010 ELECTROCARDIOGRAM REPORT: CPT | Performed by: INTERNAL MEDICINE

## 2021-01-27 RX ORDER — PALIPERIDONE 3 MG/1
6 TABLET, EXTENDED RELEASE ORAL EVERY MORNING
Status: DISCONTINUED | OUTPATIENT
Start: 2021-01-27 | End: 2021-02-05 | Stop reason: HOSPADM

## 2021-01-27 RX ORDER — LACTOBACILLUS RHAMNOSUS GG 10B CELL
1 CAPSULE ORAL
Status: DISCONTINUED | OUTPATIENT
Start: 2021-01-27 | End: 2021-02-03

## 2021-01-27 RX ORDER — OXYCODONE HYDROCHLORIDE AND ACETAMINOPHEN 5; 325 MG/1; MG/1
1 TABLET ORAL EVERY 6 HOURS PRN
Status: DISCONTINUED | OUTPATIENT
Start: 2021-01-27 | End: 2021-02-05 | Stop reason: HOSPADM

## 2021-01-27 RX ORDER — MECOBALAMIN 5000 MCG
5 TABLET,DISINTEGRATING ORAL NIGHTLY PRN
Status: DISCONTINUED | OUTPATIENT
Start: 2021-01-27 | End: 2021-02-05 | Stop reason: HOSPADM

## 2021-01-27 RX ADMIN — LETROZOLE 2.5 MG: 2.5 TABLET ORAL at 09:00

## 2021-01-27 RX ADMIN — SODIUM CHLORIDE, PRESERVATIVE FREE 10 ML: 5 INJECTION INTRAVENOUS at 20:49

## 2021-01-27 RX ADMIN — ATORVASTATIN CALCIUM 20 MG: 10 TABLET, FILM COATED ORAL at 08:57

## 2021-01-27 RX ADMIN — INSULIN LISPRO 4 UNITS: 100 INJECTION, SOLUTION INTRAVENOUS; SUBCUTANEOUS at 09:01

## 2021-01-27 RX ADMIN — CETIRIZINE HYDROCHLORIDE 10 MG: 10 TABLET, FILM COATED ORAL at 08:57

## 2021-01-27 RX ADMIN — SODIUM CHLORIDE, PRESERVATIVE FREE 10 ML: 5 INJECTION INTRAVENOUS at 08:57

## 2021-01-27 RX ADMIN — TICAGRELOR 90 MG: 90 TABLET ORAL at 08:57

## 2021-01-27 RX ADMIN — OXYCODONE AND ACETAMINOPHEN 1 TABLET: 5; 325 TABLET ORAL at 18:28

## 2021-01-27 RX ADMIN — Medication 5 MG: at 00:20

## 2021-01-27 RX ADMIN — LISINOPRIL 10 MG: 10 TABLET ORAL at 08:57

## 2021-01-27 RX ADMIN — PALIPERIDONE 6 MG: 3 TABLET, EXTENDED RELEASE ORAL at 08:57

## 2021-01-27 RX ADMIN — SULFAMETHOXAZOLE AND TRIMETHOPRIM 1 TABLET: 800; 160 TABLET ORAL at 20:49

## 2021-01-27 RX ADMIN — INSULIN GLARGINE 26 UNITS: 100 INJECTION, SOLUTION SUBCUTANEOUS at 09:01

## 2021-01-27 RX ADMIN — SULFAMETHOXAZOLE AND TRIMETHOPRIM 1 TABLET: 800; 160 TABLET ORAL at 08:57

## 2021-01-27 RX ADMIN — INSULIN LISPRO 2 UNITS: 100 INJECTION, SOLUTION INTRAVENOUS; SUBCUTANEOUS at 18:15

## 2021-01-27 RX ADMIN — PANTOPRAZOLE SODIUM 40 MG: 40 TABLET, DELAYED RELEASE ORAL at 06:34

## 2021-01-27 RX ADMIN — OXYCODONE AND ACETAMINOPHEN 1 TABLET: 5; 325 TABLET ORAL at 09:05

## 2021-01-27 RX ADMIN — GABAPENTIN 600 MG: 300 CAPSULE ORAL at 08:57

## 2021-01-27 RX ADMIN — TICAGRELOR 90 MG: 90 TABLET ORAL at 20:49

## 2021-01-27 RX ADMIN — INSULIN LISPRO 3 UNITS: 100 INJECTION, SOLUTION INTRAVENOUS; SUBCUTANEOUS at 20:49

## 2021-01-27 RX ADMIN — SODIUM CHLORIDE: 9 INJECTION, SOLUTION INTRAVENOUS at 11:50

## 2021-01-27 RX ADMIN — ASPIRIN 81 MG: 81 TABLET, CHEWABLE ORAL at 08:57

## 2021-01-27 RX ADMIN — INSULIN LISPRO 4 UNITS: 100 INJECTION, SOLUTION INTRAVENOUS; SUBCUTANEOUS at 13:10

## 2021-01-27 RX ADMIN — Medication 1 CAPSULE: at 09:10

## 2021-01-27 RX ADMIN — GABAPENTIN 600 MG: 300 CAPSULE ORAL at 20:49

## 2021-01-27 RX ADMIN — ENOXAPARIN SODIUM 40 MG: 40 INJECTION SUBCUTANEOUS at 08:57

## 2021-01-27 ASSESSMENT — PAIN DESCRIPTION - PAIN TYPE: TYPE: ACUTE PAIN

## 2021-01-27 ASSESSMENT — PAIN SCALES - GENERAL: PAINLEVEL_OUTOF10: 4

## 2021-01-27 NOTE — PROGRESS NOTES
Comprehensive Nutrition Assessment    Type and Reason for Visit:  Initial, Positive Nutrition Screen    Nutrition Recommendations/Plan:   1. Continue Carb controlled diet  2. Add Glucerna ONS BID  3. Monitor nutrition adequacy, pertinent labs, bowel habits, wt changes, and clinical progress      Nutrition Assessment:  Positive nutrition screen for poor po intake, wt loss. Pt is a 62 y/o female admitted with falls, weakness. Nutritionally compromised AEB poor po intake, wt loss. Currently ordered on carb controlled diet. Consumed % today. Wts trending down x past year. Recommend ONS to improve calorie intake. Encourage nutrition. Malnutrition Assessment:  Malnutrition Status: Moderate malnutrition    Context:  Chronic Illness     Findings of the 6 clinical characteristics of malnutrition:  Energy Intake:  7 - 75% or less estimated energy requirements for 1 month or longer  Weight Loss:  7 - Greater than 20% over 1 year       Estimated Daily Nutrient Needs:  Energy (kcal):  3604-4102 kcals; Weight Used for Energy Requirements:  Current(53 kg)     Protein (g):  53-64 g; Weight Used for Protein Requirements:  Current(1-1.2)        Fluid (ml/day):  1 mL/kcal; Method Used for Fluid Requirements:  1 ml/kcal      Nutrition Related Findings:  +BM x2 1/27      Wounds:  None       Current Nutrition Therapies:    DIET CARB CONTROL; Dietary Nutrition Supplements: Diabetic Oral Supplement    Anthropometric Measures:  · Height: 5' 3\" (160 cm)  · Current Body Weight: 115 lb (52.2 kg)   · Usual Body Weight: 167 lb (75.8 kg)(2/2020)     · Ideal Body Weight: 115 lbs  · BMI: 20.4  · BMI Categories: Normal Weight (BMI 18.5-24. 9)       Nutrition Diagnosis:   · Inadequate oral intake related to early satiety as evidenced by poor intake prior to admission      Nutrition Interventions:   Food and/or Nutrient Delivery:  Continue Current Diet, Start Oral Nutrition Supplement  Nutrition Education/Counseling:  No recommendation at this time   Coordination of Nutrition Care:  Continue to monitor while inpatient    Goals: Tolerate diet and consume greater than 50% of meals and ONS this admission       Nutrition Monitoring and Evaluation:   Physical Signs/Symptoms Outcomes:  GI Status, Hemodynamic Status     Discharge Planning:    Continue current diet, Continue Oral Nutrition Supplement     Electronically signed by Jasmina Gong.  Aleshia Brennan RD, LD on 1/27/21 at 2:03 PM EST    Contact: [phone]

## 2021-01-27 NOTE — PROGRESS NOTES
Hospitalist Progress Note      PCP: Idalmis Carrington    Date of Admission: 1/26/2021    Chief Complaint: Weakness, progressive. Frequent falls    Hospital Course: Admitted with frequent falls. Tentatively diagnosed with UTI. Most of the ordered tests are pending. Urine culture not back yet. Subjective: Subjectively stronger. No chest pain, no shortness of breath, no nausea or vomiting. No abdominal pain. Medications:  Reviewed    Infusion Medications    sodium chloride 75 mL/hr at 01/27/21 1150    dextrose       Scheduled Medications    paliperidone  6 mg Oral QAM    lactobacillus  1 capsule Oral Daily with breakfast    aspirin  81 mg Oral Daily    cetirizine  10 mg Oral Daily    fluticasone  2 spray Each Nostril Daily    gabapentin  600 mg Oral TID    insulin glargine  26 Units Subcutaneous QAM    letrozole  2.5 mg Oral Daily    lisinopril  10 mg Oral Daily    pantoprazole  40 mg Oral QAM AC    atorvastatin  20 mg Oral Daily    ticagrelor  90 mg Oral BID    sodium chloride flush  10 mL Intravenous 2 times per day    enoxaparin  40 mg Subcutaneous Daily    insulin lispro  0-12 Units Subcutaneous TID WC    insulin lispro  0-6 Units Subcutaneous Nightly    sulfamethoxazole-trimethoprim  1 tablet Oral 2 times per day     PRN Meds: melatonin, oxyCODONE-acetaminophen, sodium chloride flush, promethazine **OR** ondansetron, magnesium hydroxide, acetaminophen **OR** acetaminophen, glucose, dextrose, glucagon (rDNA), dextrose      Intake/Output Summary (Last 24 hours) at 1/27/2021 1414  Last data filed at 1/27/2021 0932  Gross per 24 hour   Intake 480 ml   Output --   Net 480 ml       Physical Exam Performed:    BP (!) 146/79   Pulse 67   Temp 98.8 °F (37.1 °C) (Oral)   Resp 16   Ht 5' 3\" (1.6 m)   Wt 115 lb 14.4 oz (52.6 kg)   SpO2 97%   BMI 20.53 kg/m²     General appearance: No apparent distress, appears stated age and cooperative.   HEENT: Pupils equal, round, and reactive to light. Conjunctivae/corneas clear. Neck: Supple, with full range of motion. No jugular venous distention. Trachea midline. Respiratory:  Normal respiratory effort. Clear to auscultation, bilaterally without Rales/Wheezes/Rhonchi. Cardiovascular: Regular rate and rhythm with normal S1/S2 without murmurs, rubs or gallops. Abdomen: Soft, non-tender, non-distended with normal bowel sounds. Musculoskeletal: No clubbing, cyanosis or edema bilaterally. Full range of motion without deformity. Skin: Skin color, texture, turgor normal.  No rashes or lesions. Neurologic:  Neurovascularly intact without any focal sensory/motor deficits. Cranial nerves: II-XII intact, grossly non-focal.  Psychiatric: Alert and oriented, thought content appropriate, normal insight  Capillary Refill: Brisk,< 3 seconds   Peripheral Pulses: +2 palpable, equal bilaterally       Labs:   Recent Labs     01/26/21  1621 01/27/21  0706   WBC 5.7 6.3   HGB 10.1* 9.2*   HCT 32.5* 29.6*    145     Recent Labs     01/26/21  1621 01/27/21  0706   * 140   K 4.0 3.5    107   CO2 22 23   BUN 15 12   CREATININE 1.0 0.9   CALCIUM 9.7 8.8     Recent Labs     01/26/21  1621   AST 34   ALT 84*   BILITOT <0.2   ALKPHOS 321*     No results for input(s): INR in the last 72 hours. Recent Labs     01/26/21 1621   TROPONINI <0.01       Urinalysis:      Lab Results   Component Value Date    NITRU POSITIVE 01/26/2021    WBCUA 6-9 01/26/2021    BACTERIA 2+ 11/11/2020    RBCUA 3-4 01/26/2021    BLOODU TRACE-LYSED 01/26/2021    SPECGRAV 1.015 01/26/2021    GLUCOSEU >=1000 01/26/2021    GLUCOSEU >=1000 mg/dL 06/07/2010       Radiology:  CT CERVICAL SPINE WO CONTRAST   Final Result   No acute abnormality of the cervical spine. Focal spondylosis at C5-6. CT HEAD WO CONTRAST   Final Result   No acute intracranial abnormality. Opacification of the right maxillary sinus, suggesting sinusitis.          XR FOOT RIGHT (2 VIEWS)   Final Result Chronic findings are noted. No acute radiographic abnormality is appreciated. XR CHEST (2 VW)   Final Result   No acute cardiopulmonary disease. Assessment/Plan:    Active Hospital Problems    Diagnosis    Frequent falls [R29.6]     PLAN    Progressive weakness  Unclear reason  Medication review noted patient is on high dose of clonazepam which could explain frequent falls and weakness  PT OT consulted  Case management consulted    Diabetes mellitus type 2 with hyperglycemia  Hemoglobin A1c is 13.8%, indicating no compliance with diet. Continue Lantus and sliding scale insulin  Carb controlled diet started     Abnormal urinalysis, presumed UTI  Patient is not a good historian  Awaiting urine culture results, receiving empiric oral antibiotic in the meantime. Hypertension  Controlled blood pressure. Continue same    Coronary artery disease  Pain-free. Continue antiplatelets and statin    Breast cancer  In remission. Continue letrozole    Dyslipidemia  Continue statin    Discussed with the patient    Discussed with nursing    DVT Prophylaxis: Lovenox  Diet: DIET CARB CONTROL;   Dietary Nutrition Supplements: Diabetic Oral Supplement  Code Status: Full Code    PT/OT Eval Status: Requested    Dispo -patient stay pending information availability    Marie Hodges MD

## 2021-01-27 NOTE — PROGRESS NOTES
4 Eyes Skin Assessment     The patient is being assess for   Admission    I agree that 2 RN's have performed a thorough Head to Toe Skin Assessment on the patient. ALL assessment sites listed below have been assessed. Areas assessed by both nurses:   [x]   Head, Face, and Ears   [x]   Shoulders, Back, and Chest, Abdomen  [x]   Arms, Elbows, and Hands   [x]   Coccyx, Sacrum, and Ischium  [x]   Legs, Feet, and Heels          **SHARE this note so that the co-signing nurse is able to place an eSignature**    Co-signer eSignature: Electronically signed by Adithya Sawant RN on 1/26/21 at 11:24 PM EST    Does the Patient have Skin Breakdown?   No          [place] Prevention initiated:  No   Wound Care Orders initiated:  No      [facility] nurse consulted for Pressure Injury (Stage 3,4, Unstageable, DTI, NWPT, Complex wounds)and New or Established Ostomies:  No      Primary Nurse eSignature: Electronically signed by No Hairston RN on 1/26/21 at 11:21 PM EST

## 2021-01-27 NOTE — PROGRESS NOTES
Occupational Therapy   Occupational Therapy Initial Assessment and Treatment Note  Date: 2021   Patient Name: Bob Aldana  MRN: [id number]     : 1960    Date of Service: 2021    Discharge Recommendations:  Subacute/Skilled Nursing Facility     Assessment   Performance deficits / Impairments: Decreased functional mobility ; Decreased ADL status; Decreased safe awareness;Decreased balance;Decreased endurance;Decreased coordination  Assessment: OT eval completed. Pt admitted for frequent falls from home. She c/o B foot pain and neuropathy. During mobility, pt required CGA/min A for standing balance and transfers. She appeared to lack proprioception of LE when standing, causing her to sway. Recommend SNF at d/c to improve ADLs prior to d/c home alone. Cont skilled OT in acute care. Treatment Diagnosis: deconditioning  Prognosis: Good  Decision Making: Medium Complexity  OT Education: OT Role;Plan of Care;Transfer Training;ADL Adaptive Strategies  Patient Education: disease specific ed:  role of OT, safe transfer technique, standing balance during ADLs-pt verbalized understanding  REQUIRES OT FOLLOW UP: Yes  Activity Tolerance  Activity Tolerance: Patient Tolerated treatment well  Activity Tolerance: /65, HR 71, O2 sats 100%RA  Safety Devices  Safety Devices in place: Yes  Type of devices: Gait belt;Call light within reach; Chair alarm in place;Nurse notified; Left in chair         Patient Diagnosis(es): The primary encounter diagnosis was Acute cystitis with hematuria. Diagnoses of Frequent falls, Closed head injury, initial encounter, and Generalized weakness were also pertinent to this visit.      has a past medical history of Abnormal brain MRI, Acute bilateral low back pain without sciatica, SHARRON (acute kidney injury) ([facility] [address] Arthritis, Bipolar disorder ([facility] [address] CAD (coronary artery disease), Cancer ([facility] [address] Carotid stenosis, bilateral:<50%:per [place] 2016, Carpal tunnel syndrome, Cervical cancer screening, Coronary artery disease of native artery of native heart with stable angina pectoris ([facility] [address] DDD (degenerative disc disease), lumbar, Depression, Depression/anxiety, Depression/anxiety, Diabetes mellitus ([address] Gout, History of mammogram, Hyperlipidemia, Hypertension, 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 has a past surgical history that includes Kidney removal; Hysterectomy; Breast lumpectomy (2015); Tubal ligation; other surgical history (Right); and Cardiac catheterization (06/08/2020).     Treatment Diagnosis: deconditioning      Restrictions  Restrictions/Precautions  Restrictions/Precautions: Fall Risk, General Precautions  Position Activity Restriction  Other position/activity restrictions: up with assist    Subjective   General  Chart Reviewed: Yes  Patient assessed for rehabilitation services?: Yes  Family / Caregiver Present: No  Referring Practitioner: GRADY Black  Diagnosis: frequent falls  Subjective  Subjective: Pt c/o B foot pain but was agreeable to OT  General Comment  Comments: RN approved therapy    Social/Functional History  Social/Functional History  Lives With: Alone  Type of Home: Apartment(second floor)  Home Layout: One level  Home Access: Stairs to enter with rails  Entrance Stairs - Number of Steps: 4 steps to enter  Entrance Stairs - Rails: Both  Bathroom Shower/Tub: Tub/Shower unit  Bathroom Toilet: Standard  Bathroom Equipment: Shower chair  Home Equipment: Standard walker, Cane, Alert Button  ADL Assistance: Independent  Homemaking Assistance: Needs assistance(family assists with cooking and cleaning)  Ambulation Assistance: Independent(no device)  Transfer Assistance: Independent  Active : No  Occupation: Unemployed  Leisure & Hobbies: [employer], [employer]  Additional Comments: Multiple falls at home     Objective   Vision: Impaired  Vision Exceptions: Wears glasses at all times  Hearing: Within functional limits          Balance  Sitting Balance: Stand by assistance  Standing Balance: Contact guard assistance(RW)  Standing Balance  Time: stood >5 min at sink for ADLs  Activity: Pt had 2 balance falters when standing, requiring min A to correct. She sways during static and dynamic standing balance activities. Pt c/o neuropathy in B feet. Functional Mobility  Functional - Mobility Device: Rolling Walker  Activity: To/from bathroom  Assist Level: Contact guard assistance  Toilet Transfers  Toilet - Technique: Ambulating(RW)  Equipment Used: Grab bars  Toilet Transfer: Minimal assistance  ADL  Grooming: Contact guard assistance; Increased time to complete(brush teeth and wash hands/face at sink)  LE Dressing: Contact guard assistance(don brief)  Toileting: Contact guard assistance(for standing balance)  Tone RUE  RUE Tone: Normotonic  Tone LUE  LUE Tone: Normotonic  Coordination  Movements Are Fluid And Coordinated: No  Coordination and Movement description: Tremors;Decreased accuracy; Right UE;Left UE     Bed mobility  Supine to Sit: Stand by assistance  Sit to Supine: Unable to assess  Transfers  Stand Pivot Transfers: Contact guard assistance(RW)  Sit to stand: Minimal assistance  Stand to sit: Contact guard assistance     Cognition  Overall Cognitive Status: Exceptions  Arousal/Alertness: Delayed responses to stimuli  Following Commands:  Follows one step commands consistently  Attention Span: Attends with cues to redirect  Safety Judgement: Decreased awareness of need for safety  Problem Solving: Assistance required to identify errors made;Assistance required to correct errors made;Decreased awareness of errors  Insights: Decreased awareness of deficits  Sequencing: Requires cues for some        Sensation  Overall Sensation Status: Impaired(reports baseline neuropathy in B feet)      LUE AROM (degrees)  LUE AROM : WFL  RUE AROM (degrees)  RUE AROM : WFL  LUE Strength  Gross LUE Strength: WFL  RUE Strength  Gross RUE Strength: WFL      Plan   Plan  Times per week: 3-5x  Current Treatment Recommendations: Self-Care / ADL, Functional Mobility Training, Balance Training, Endurance Training, Equipment Evaluation, Education, & procurement, Safety Education & Training   AM-PAC Score   AM-PAC Inpatient Daily Activity Raw Score: 18 (01/27/21 1303)  AM-PAC Inpatient ADL T-Scale Score : 38.66 (01/27/21 1303)  ADL Inpatient CMS 0-100% Score: 46.65 (01/27/21 1303)  ADL Inpatient CMS G-Code Modifier : CK (01/27/21 1303)  Goals  Short term goals  Time Frame for Short term goals: 1 week (2/3) unless noted  Short term goal 1: Perform functional transfers with supervision and RW  Short term goal 2: Perform bathroom mobility with supervision and RW  Short term goal 3: Perform LE dressing with supervision  Short term goal 4: Perform UE exer 15x each to improve endurance by 1/30  Patient Goals   Patient goals : \"Be able to walk better\"     Therapy Time   Individual Concurrent Group Co-treatment   Time In 0756         Time Out 0829         Minutes 33         Timed Code Treatment Minutes: 23 Minutes(10 min eval)    If pt is discharged prior to next OT session, this note will serve as the discharge summary.   Reymundo Dykes OT

## 2021-01-27 NOTE — ED NOTES
2105 put patient back to bed after taking her to the bathroom patient said she was ok just a little weak patient looks good to me.      Elle Nuñez  01/26/21 2106

## 2021-01-27 NOTE — ED NOTES
Pt had squad call stating that she can't walk and that her glucose has been high. Pt state that at home today she has had several falls. Pt state that she also had had increase urination. Pt is awake alert resp easy lungs cta. Pt is orient x4. Pt sat up in bed and ate meal. Pt spoke with family on the phone this rn spoke with family on the phone and gave update. Pt is able to walk with walker and one asst to restroom with steady gait. Pt perrl 3/3.  Pt state that she has a walker at home however she state that she does not use it cause she \"don't feel that she needs it\"     Radha Bolivar RN  01/26/21 2114       Radha Bolivar RN  01/26/21 2123

## 2021-01-27 NOTE — H&P
Hospital Medicine History & Physical      PCP: Yelitzaotonielne Dena    Date of Admission: 1/26/2021    Date of Service: Pt seen/examined on 1/26/2021 and Admitted to Inpatient with expected LOS greater than two midnights due to medical therapy. Chief Complaint:    Chief Complaint   Patient presents with    Fall    Fatigue    Foot Injury     right foot      History Of Present Illness:     63 y.o. female, with PMH of HTN, HLD, CAD status post PCI, DM 2, breast CA, anxiety, and depression, who presented to [facility] with frequent falls and fatigue. History was obtained from the patient and review of the EMR. The patient states that she has been having a hard time walking lately and even today she has fallen about 15 times at home. She does admit to hitting her head a few times, but denies any loss of consciousness at all. She denies any lightheadedness or dizziness, only states that she has weakness in her legs. States she had to scoot herself around on the floor to call for help earlier as she kept falling. Today she does complain of some right foot and ankle pain due to falling and straining this area. Her family and her decided to come into the ED for further evaluation as she is progressively getting worse with her weakness. Per family report and patient report, it appears that the patient has also lost about 100 pounds over the past year or so and they are worried about her wellbeing. She states she has spoken to her PCP numerous times about this, but has not really had much workup done. She will be admitted for further observation and possible SNF placement.     Past Medical History:          Diagnosis Date    Abnormal brain MRI 7/20/2017    Partially empty sella and minimal chronic small vessel ischemic disease    Acute bilateral low back pain without sciatica 11/2/2016    SHARRON (acute kidney injury) ([facility] [address] 7/5/2017    Arthritis     back    Bipolar disorder ([facility] [address] 10/18/2008    CAD (coronary artery disease)     stent placed 6/8/20    Cancer [facility]) 2015    bilateral breast:s/p lumpectomy/radiation:under care care of breast specialist:Dr. Boone     Carotid stenosis, bilateral:<50%:per [place] 7/2016 7/15/2016    Carpal tunnel syndrome 10/18/2008    Cervical cancer screening 2014    Nml per pt'.  Coronary artery disease of native artery of native heart with stable angina pectoris ([facility] [address] 6/9/2020    DDD (degenerative disc disease), lumbar 7/18/2018    Depression     under care of pschiatrist:Dr. 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  Scoliosis     Spondylosis of lumbar region without myelopathy or radiculopathy 3/10/2017    Transient cerebral ischemia 07/15/2016    TIA:7/10/16    Unspecified cerebral artery occlusion with cerebral infarction     TIA       Past Surgical History:          Procedure Laterality Date    BREAST LUMPECTOMY  2015    Bilateral:breast cancer    CARDIAC CATHETERIZATION  06/08/2020    Dr. Ramona Lilly), DAYANA to Diag 1    HYSTERECTOMY      Benign:no cervical cancer per pt'    KIDNEY REMOVAL      right    OTHER SURGICAL HISTORY Right     orif right ankle    TUBAL LIGATION         Medications Prior to Admission:      Prior to Admission medications    Medication Sig Start Date End Date Taking?  Authorizing Provider   TRUE METRIX BLOOD GLUCOSE TEST strip USE AS DIRECTED TO TEST 4 TIMES A DAY 1/7/21   Anny paliperidone (INVEGA) 9 MG extended release tablet Take 9 mg by mouth every morning  1/22/18   Historical Provider, MD   aspirin 81 MG tablet Take 81 mg by mouth daily    Historical Provider, MD   gabapentin (NEURONTIN) 600 MG tablet Take 600 mg by mouth 3 times daily    Historical Provider, MD   oxyCODONE-acetaminophen (PERCOCET) 5-325 MG per tablet Take 1 tablet by mouth every 6 hours as needed for Pain. Edwardo Gunter Historical Provider, MD   traZODone (DESYREL) 100 MG tablet Take 100 mg by mouth nightly    Historical Provider, MD       Allergies:  Morphine, Penicillins, Codeine, and Penicillin g    Social History:      The patient currently lives independently. TOBACCO:   reports that she quit smoking about 6 years ago. Her smoking use included cigarettes and cigars. She has a 10.00 pack-year smoking history. She has never used smokeless tobacco.  ETOH:   reports no history of alcohol use. Family History:      Reviewed in detail. Positive as follows:        Problem Relation Age of Onset    Cancer Mother         breast    Cancer Father     Heart Failure Neg Hx     High Cholesterol Neg Hx     Hypertension Neg Hx     Migraines Neg Hx     Rashes/Skin Problems Neg Hx     Seizures Neg Hx     Stroke Neg Hx     Thyroid Disease Neg Hx     Diabetes Neg Hx        REVIEW OF SYSTEMS:   Pertinent positives as noted in the HPI. All other systems reviewed and negative. PHYSICAL EXAM PERFORMED:    BP (!) 120/56   Pulse 63   Temp 98.7 °F (37.1 °C)   Resp 12   Ht 5' 3\" (1.6 m)   Wt 143 lb (64.9 kg)   SpO2 100%   BMI 25.33 kg/m²     General appearance:  No apparent distress, appears stated age and cooperative. HEENT:  Normal cephalic, atraumatic without obvious deformity. Pupils equal, round, and reactive to light. Extra ocular muscles intact. Conjunctivae/corneas clear. Neck: Supple, with full range of motion. No jugular venous distention. Trachea midline. Respiratory:  Normal respiratory effort.  Clear to auscultation, bilaterally without Rales/Wheezes/Rhonchi. Cardiovascular:  Regular rate and rhythm with normal S1/S2 without murmurs, rubs or gallops. Abdomen: Soft, non-tender, non-distended with normal bowel sounds. Musculoskeletal:  No clubbing, cyanosis or edema bilaterally. Full range of motion without deformity. Skin: Skin color, texture, turgor normal.  No rashes or lesions. Neurologic:  Neurovascularly intact without any focal sensory/motor deficits. Cranial nerves: II-XII intact, grossly non-focal.  Psychiatric:  Alert and oriented, thought content appropriate, normal insight  Capillary Refill: Brisk,< 3 seconds   Peripheral Pulses: +2 palpable, equal bilaterally       Labs:     Recent Labs     01/26/21  1621   WBC 5.7   HGB 10.1*   HCT 32.5*        Recent Labs     01/26/21  1621   *   K 4.0      CO2 22   BUN 15   CREATININE 1.0   CALCIUM 9.7     Recent Labs     01/26/21  1621   AST 34   ALT 84*   BILITOT <0.2   ALKPHOS 321*     No results for input(s): INR in the last 72 hours. Recent Labs     01/26/21  1621   TROPONINI <0.01       Urinalysis:      Lab Results   Component Value Date    NITRU POSITIVE 01/26/2021    WBCUA 6-9 01/26/2021    BACTERIA 2+ 11/11/2020    RBCUA 3-4 01/26/2021    BLOODU TRACE-LYSED 01/26/2021    SPECGRAV 1.015 01/26/2021    GLUCOSEU >=1000 01/26/2021    GLUCOSEU >=1000 mg/dL 06/07/2010       Radiology:     CXR: I have reviewed the CXR with the following interpretation: No acute cardiopulmonary findings  EKG:  I have reviewed the EKG with the following interpretation: NSR    CT CERVICAL SPINE WO CONTRAST   Final Result   No acute abnormality of the cervical spine. Focal spondylosis at C5-6. CT HEAD WO CONTRAST   Final Result   No acute intracranial abnormality. Opacification of the right maxillary sinus, suggesting sinusitis. XR FOOT RIGHT (2 VIEWS)   Final Result   Chronic findings are noted.   No acute radiographic abnormality is appreciated. XR CHEST (2 VW)   Final Result   No acute cardiopulmonary disease. ASSESSMENT:    Active Hospital Problems    Diagnosis Date Noted    Frequent falls [R29.6] 07/05/2017         PLAN:    Frequent falls, debility  - PT/OT consult pending  - Case management consulted, thank you    DM2, uncontrolled. -  on admission  - Hemoglobin a1c 11.9 on 10/08/2020, repeat pending  - MDSSI + home lantus  - POCT ac/hs  - Hypoglycemia protocol  - Carb control diet    Abnormal UA  - UA with glucose, trace hematuria, positive nitrites, 6-9 WBCs, yeast present  - IV cipro given in ED, reviewed previous urine cx from 2020 - had e. Coli previously with resistance to cipro and sensitivity to bactrim-changed to bactrim BID for now pending culture results    Essential HTN in setting of known hx of CAD s/p PCI (most recent June 2020), controlled. - Continue home DAPT, statin, lisinopril  - Telemetry monitoring    Hx of HLD, controlled with statin. - Continue home statin  - Follow-up with PCP for med adjustments    Hx of breast CA  - Continue letrozole    Hx of anxiety/depression, mood currently stable  - Continue klonopin, trazadone and invega    DVT Prophylaxis: Lovenox  Diet: DIET CARB CONTROL;  Code Status: Full Code    PT/OT Eval Status: Ordered and pending    Dispo -pending clinical improvement       Susi Reveles - NP    Thank you Tamara Carrington for the opportunity to be involved in this patient's care.  If you have any questions or concerns please feel free to contact me at [phone].  -------------------------Dr. Mayte Barber---------------------

## 2021-01-27 NOTE — PROGRESS NOTES
Physical Therapy    Facility/Department: [facility] C5 - MED SURG/ORTHO  Initial Assessment    NAME: Wendie Galicia  : 1960  MRN: [id number]    Date of Service: 2021    Discharge Recommendations:  Subacute/Skilled Nursing Facility   PT Equipment Recommendations  Equipment Needed: No  Other: if d/c home, needs RW    Assessment   Body structures, Functions, Activity limitations: Decreased functional mobility ; Decreased balance;Decreased strength;Decreased endurance  Assessment: Pt is 64 yo female who presents with diagnosis of frequent falls. Pt indep to mod I with mobility at baseline. Grossly CGA to min A for mobility with RW this date. Limited d/t increased sway with all activities and impaired balance. Pt also admitted for frequent falls and states she falls regularly at home. Pt would benefit from continued skilled therapy to address deficits. Recommend SNF at d/c due to high fall risk, lives alone, and falls frequently. Treatment Diagnosis: impaired functional mobility  Specific instructions for Next Treatment: progress mobility as tolerated  Prognosis: Good  Decision Making: Low Complexity  PT Education: Goals; General Safety;Gait Training;PT Role;Disease Specific Education;Plan of Care; Functional Mobility Training;Transfer Training  Patient Education: Pt educated on safe mobility with RW to improve balance -- pt verbalized understanding  Barriers to Learning: none  REQUIRES PT FOLLOW UP: Yes  Activity Tolerance  Activity Tolerance: Patient Tolerated treatment well  Activity Tolerance: /65, HR 71, SpO2 99% on RA       Patient Diagnosis(es): The primary encounter diagnosis was Acute cystitis with hematuria. Diagnoses of Frequent falls, Closed head injury, initial encounter, and Generalized weakness were also pertinent to this visit.      has a past medical history of Abnormal brain MRI, Acute bilateral low back pain without sciatica, SHARRON (acute kidney injury) ([facility] [address] Arthritis, Bipolar disorder ([facility] [address] CAD (coronary artery disease), Cancer ([facility] [address] Carotid stenosis, bilateral:<50%:per [place] 7/2016, Carpal tunnel syndrome, Cervical cancer screening, Coronary artery disease of native artery of native heart with stable angina pectoris ([address] DDD (degenerative disc disease), lumbar, Depression, Depression/anxiety, Depression/anxiety, Diabetes mellitus ([address] Gout, History of mammogram, Hyperlipidemia, Hypertension, 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 has a past surgical history that includes Kidney removal; Hysterectomy; Breast lumpectomy (2015); Tubal ligation; other surgical history (Right); and Cardiac catheterization (06/08/2020).     Restrictions  Restrictions/Precautions  Restrictions/Precautions: Fall Risk, General Precautions  Position Activity Restriction  Other position/activity restrictions: up with assist  Vision/Hearing  Vision: Impaired  Vision Exceptions: Wears glasses at all times  Hearing: Within functional limits     Subjective  General  Chart Reviewed: Yes  Patient assessed for rehabilitation services?: Yes  Response To Previous Treatment: Not applicable  Family / Caregiver Present: No  Referring Practitioner: ALANA Borjas  Referral Date : 01/27/21  Diagnosis: Frequent Falls  Follows Commands: Within Functional Limits  General Comment  Comments: Pt resting in bed on approach; RN cleared pt for therapy  Subjective  Subjective: Pt agreeable to therapy  Pain Screening  Patient Currently in Pain: Denies    Orientation  Orientation  Overall Orientation Status: Within Functional Limits  Social/Functional History  Social/Functional History  Lives With: Alone  Type of Home: Apartment(second floor)  Home Layout: One level  Home Access: Stairs to enter with rails  Entrance Stairs - Number of Steps: 4 steps to enter  Entrance Stairs - Rails: Both  Bathroom Shower/Tub: Tub/Shower unit  Bathroom Toilet: Standard  Bathroom Equipment: Shower chair  Home Equipment: Standard walker, Cane, Alert Button  ADL Assistance: Independent  Homemaking Assistance: Needs assistance(family assists with cooking and cleaning)  Ambulation Assistance: Independent(no device)  Transfer Assistance: Independent  Active : No  Occupation: Unemployed  Leisure & Hobbies: [employer], [employer]  Additional Comments: Multiple falls at home    Objective     RLE AROM: WFL  LLE AROM : WFL  Strength RLE  Comment: Grossly 4-/5 to 4/5 throughout; unable to assess right ankle DF d/t pain  Strength LLE  Comment: Grossly 4-/5 to 4/5 throughout     Sensation  Overall Sensation Status: Impaired(reports baseline neuropathy in B feet)     Bed mobility  Supine to Sit: Stand by assistance(HOB elevated, use of rails)  Sit to Supine: Unable to assess(pt up in chair at end of session)     Transfers  Sit to Stand: Minimal Assistance(increased sway with standing x 2 from EOB. Cues for hand placement when standing to RW)  Stand to sit: Contact guard assistance     Ambulation  Ambulation?: Yes  Ambulation 1  Surface: level tile  Device: Rolling Walker  Assistance: Contact guard assistance;Minimal assistance  Quality of Gait: CGA-min A for balance with RW d/t increased M/L sway. Pt with decreased proprioception bilateral feet with uneven step length and foot clearance. No overt LOB  Gait Deviations: Decreased step length;Decreased step height;Slow Olga; Shuffles  Distance: 15 ft x 2 + 120 ft     Balance  Sitting - Static: Good  Sitting - Dynamic: Fair;+  Standing - Static: Fair  Standing - Dynamic: Fair  Comments: Posterior LOB sitting EOB, pt able to self correct.  CGA to SBA for standing at sink x 8 minutes while completing ADLs. Increased sway noted with prolonged standing        Plan   Plan  Times per week: 3-5x/wk  Times per day: Daily  Specific instructions for Next Treatment: progress mobility as tolerated  Current Treatment Recommendations: Strengthening, Neuromuscular Re-education, Safety Education & Training, Balance Training, Endurance Training, Functional Mobility Training, Transfer Training, Gait Training, Equipment Evaluation, Education, & procurement, Stair training, Patient/Caregiver Education & Training  Safety Devices  Type of devices: All fall risk precautions in place, Call light within reach, Chair alarm in place, Gait belt, Patient at risk for falls, Nurse notified, Left in chair                        AM-PAC Score  AM-PAC Inpatient Mobility Raw Score : 18 (01/27/21 0911)  AM-PAC Inpatient T-Scale Score : 43.63 (01/27/21 0911)  Mobility Inpatient CMS 0-100% Score: 46.58 (01/27/21 0911)  Mobility Inpatient CMS G-Code Modifier : CK (01/27/21 0911)          Goals  Short term goals  Time Frame for Short term goals: 1 week (2/03) unless otherwise specified  Short term goal 1: Pt will be mod I with bed mobility. Short term goal 2: Pt will be supervision for transfers with RW. Short term goal 3: Pt will ambulate 150 ft with supervision and RW. Short term goal 4: Pt will negotiate 4 stairs with rail and SBA (if d/c home). Short term goal 5: 1/31: Pt will participate in 12-15 reps of BLE exercises to promote strength and activity tolerance. Patient Goals   Patient goals : \"to go home\"       Therapy Time   Individual Concurrent Group Co-treatment   Time In 0756         Time Out 0829         Minutes 33         Timed Code Treatment Minutes: Lissette PT, DPT  If pt is unable to be seen after this session, please let this note serve as discharge summary. Please see case management note for discharge disposition. Thank you.

## 2021-01-27 NOTE — ACP (ADVANCE CARE PLANNING)
Advance Care Planning     General Advance Care Planning (ACP) Conversation    Date of Conversation: 1/26/2021  Conducted with: Patient with Decision Making Capacity    Healthcare Decision Maker:      Primary Decision Maker: Leonor Goyal - Other - [phone]    Secondary Decision Maker: Maurice Fried - Saleem - [phone]    Supplemental (Other) Decision Maker: AbhijeetDamaris [email or web address]  Other - [phone]        Content/Action Overview:  Has NO ACP documents/care preferences - requested patient complete ACP documents  Reviewed DNR/DNI and patient elects Full Code (Attempt Resuscitation)  ventilation preferences  Patient is in agreement to ventilation for a short time period, with the intentions to be that the patient will not need it long term. Patient was unsure if she wanted long term ventilation.     Length of Voluntary ACP Conversation in minutes:  <16 minutes (Non-Billable)    Mary Jane Cheng

## 2021-01-27 NOTE — ED NOTES
2020 Hospitalist Admit for UTI, frequent falls, over 100 pound weight loss in 6 months  2033 [employer]  called back     Filipe  01/26/21 2037

## 2021-01-28 LAB
A/G RATIO: 0.8 (ref 1.1–2.2)
ALBUMIN SERPL-MCNC: 2.7 G/DL (ref 3.4–5)
ALP BLD-CCNC: 221 U/L (ref 40–129)
ALT SERPL-CCNC: 48 U/L (ref 10–40)
ANION GAP SERPL CALCULATED.3IONS-SCNC: 6 MMOL/L (ref 3–16)
AST SERPL-CCNC: 21 U/L (ref 15–37)
BASOPHILS ABSOLUTE: 0 K/UL (ref 0–0.2)
BASOPHILS RELATIVE PERCENT: 0.2 %
BILIRUB SERPL-MCNC: <0.2 MG/DL (ref 0–1)
BUN BLDV-MCNC: 11 MG/DL (ref 7–20)
CALCIUM SERPL-MCNC: 8 MG/DL (ref 8.3–10.6)
CHLORIDE BLD-SCNC: 107 MMOL/L (ref 99–110)
CO2: 23 MMOL/L (ref 21–32)
CREAT SERPL-MCNC: 1 MG/DL (ref 0.6–1.2)
EOSINOPHILS ABSOLUTE: 0 K/UL (ref 0–0.6)
EOSINOPHILS RELATIVE PERCENT: 0.6 %
GFR AFRICAN AMERICAN: >60
GFR NON-AFRICAN AMERICAN: 56
GLOBULIN: 3.2 G/DL
GLUCOSE BLD-MCNC: 128 MG/DL (ref 70–99)
GLUCOSE BLD-MCNC: 154 MG/DL (ref 70–99)
GLUCOSE BLD-MCNC: 173 MG/DL (ref 70–99)
GLUCOSE BLD-MCNC: 184 MG/DL (ref 70–99)
GLUCOSE BLD-MCNC: 233 MG/DL (ref 70–99)
HCT VFR BLD CALC: 25.3 % (ref 36–48)
HEMOGLOBIN: 8.1 G/DL (ref 12–16)
LYMPHOCYTES ABSOLUTE: 2 K/UL (ref 1–5.1)
LYMPHOCYTES RELATIVE PERCENT: 36.5 %
MCH RBC QN AUTO: 28.4 PG (ref 26–34)
MCHC RBC AUTO-ENTMCNC: 31.8 G/DL (ref 31–36)
MCV RBC AUTO: 89.3 FL (ref 80–100)
MONOCYTES ABSOLUTE: 0.3 K/UL (ref 0–1.3)
MONOCYTES RELATIVE PERCENT: 6 %
NEUTROPHILS ABSOLUTE: 3.2 K/UL (ref 1.7–7.7)
NEUTROPHILS RELATIVE PERCENT: 56.7 %
ORGANISM: ABNORMAL
PDW BLD-RTO: 15.9 % (ref 12.4–15.4)
PERFORMED ON: ABNORMAL
PLATELET # BLD: 116 K/UL (ref 135–450)
PMV BLD AUTO: 10.6 FL (ref 5–10.5)
POTASSIUM SERPL-SCNC: 3.7 MMOL/L (ref 3.5–5.1)
RBC # BLD: 2.84 M/UL (ref 4–5.2)
SARS-COV-2, NAAT: NOT DETECTED
SODIUM BLD-SCNC: 136 MMOL/L (ref 136–145)
TOTAL PROTEIN: 5.9 G/DL (ref 6.4–8.2)
URINE CULTURE, ROUTINE: ABNORMAL
URINE CULTURE, ROUTINE: ABNORMAL
WBC # BLD: 5.6 K/UL (ref 4–11)

## 2021-01-28 PROCEDURE — 6370000000 HC RX 637 (ALT 250 FOR IP): Performed by: NURSE PRACTITIONER

## 2021-01-28 PROCEDURE — 80053 COMPREHEN METABOLIC PANEL: CPT

## 2021-01-28 PROCEDURE — 6360000002 HC RX W HCPCS: Performed by: NURSE PRACTITIONER

## 2021-01-28 PROCEDURE — U0003 INFECTIOUS AGENT DETECTION BY NUCLEIC ACID (DNA OR RNA); SEVERE ACUTE RESPIRATORY SYNDROME CORONAVIRUS 2 (SARS-COV-2) (CORONAVIRUS DISEASE [COVID-19]), AMPLIFIED PROBE TECHNIQUE, MAKING USE OF HIGH THROUGHPUT TECHNOLOGIES AS DESCRIBED BY CMS-2020-01-R: HCPCS

## 2021-01-28 PROCEDURE — 6370000000 HC RX 637 (ALT 250 FOR IP): Performed by: INTERNAL MEDICINE

## 2021-01-28 PROCEDURE — 85025 COMPLETE CBC W/AUTO DIFF WBC: CPT

## 2021-01-28 PROCEDURE — U0002 COVID-19 LAB TEST NON-CDC: HCPCS

## 2021-01-28 PROCEDURE — 2580000003 HC RX 258: Performed by: NURSE PRACTITIONER

## 2021-01-28 PROCEDURE — 1200000000 HC SEMI PRIVATE

## 2021-01-28 PROCEDURE — 36415 COLL VENOUS BLD VENIPUNCTURE: CPT

## 2021-01-28 RX ADMIN — GABAPENTIN 600 MG: 300 CAPSULE ORAL at 09:21

## 2021-01-28 RX ADMIN — CETIRIZINE HYDROCHLORIDE 10 MG: 10 TABLET, FILM COATED ORAL at 09:21

## 2021-01-28 RX ADMIN — SULFAMETHOXAZOLE AND TRIMETHOPRIM 1 TABLET: 800; 160 TABLET ORAL at 09:21

## 2021-01-28 RX ADMIN — GABAPENTIN 600 MG: 300 CAPSULE ORAL at 20:36

## 2021-01-28 RX ADMIN — Medication 1 CAPSULE: at 09:27

## 2021-01-28 RX ADMIN — FLUTICASONE PROPIONATE 2 SPRAY: 50 SPRAY, METERED NASAL at 09:20

## 2021-01-28 RX ADMIN — OXYCODONE AND ACETAMINOPHEN 1 TABLET: 5; 325 TABLET ORAL at 09:21

## 2021-01-28 RX ADMIN — INSULIN GLARGINE 26 UNITS: 100 INJECTION, SOLUTION SUBCUTANEOUS at 09:31

## 2021-01-28 RX ADMIN — Medication 5 MG: at 00:55

## 2021-01-28 RX ADMIN — ENOXAPARIN SODIUM 40 MG: 40 INJECTION SUBCUTANEOUS at 09:21

## 2021-01-28 RX ADMIN — TICAGRELOR 90 MG: 90 TABLET ORAL at 20:36

## 2021-01-28 RX ADMIN — TICAGRELOR 90 MG: 90 TABLET ORAL at 09:20

## 2021-01-28 RX ADMIN — GABAPENTIN 600 MG: 300 CAPSULE ORAL at 16:02

## 2021-01-28 RX ADMIN — LETROZOLE 2.5 MG: 2.5 TABLET ORAL at 09:31

## 2021-01-28 RX ADMIN — INSULIN LISPRO 2 UNITS: 100 INJECTION, SOLUTION INTRAVENOUS; SUBCUTANEOUS at 09:31

## 2021-01-28 RX ADMIN — ATORVASTATIN CALCIUM 20 MG: 10 TABLET, FILM COATED ORAL at 09:20

## 2021-01-28 RX ADMIN — PALIPERIDONE 6 MG: 3 TABLET, EXTENDED RELEASE ORAL at 09:27

## 2021-01-28 RX ADMIN — INSULIN LISPRO 2 UNITS: 100 INJECTION, SOLUTION INTRAVENOUS; SUBCUTANEOUS at 20:37

## 2021-01-28 RX ADMIN — OXYCODONE AND ACETAMINOPHEN 1 TABLET: 5; 325 TABLET ORAL at 20:36

## 2021-01-28 RX ADMIN — SODIUM CHLORIDE: 9 INJECTION, SOLUTION INTRAVENOUS at 16:04

## 2021-01-28 RX ADMIN — ASPIRIN 81 MG: 81 TABLET, CHEWABLE ORAL at 09:21

## 2021-01-28 RX ADMIN — ACETAMINOPHEN 650 MG: 325 TABLET ORAL at 22:59

## 2021-01-28 RX ADMIN — SULFAMETHOXAZOLE AND TRIMETHOPRIM 1 TABLET: 800; 160 TABLET ORAL at 20:36

## 2021-01-28 RX ADMIN — PANTOPRAZOLE SODIUM 40 MG: 40 TABLET, DELAYED RELEASE ORAL at 05:20

## 2021-01-28 RX ADMIN — INSULIN LISPRO 2 UNITS: 100 INJECTION, SOLUTION INTRAVENOUS; SUBCUTANEOUS at 17:28

## 2021-01-28 ASSESSMENT — PAIN SCALES - GENERAL
PAINLEVEL_OUTOF10: 0
PAINLEVEL_OUTOF10: 6
PAINLEVEL_OUTOF10: 3
PAINLEVEL_OUTOF10: 5

## 2021-01-28 ASSESSMENT — PAIN DESCRIPTION - LOCATION: LOCATION: FOOT

## 2021-01-28 NOTE — PROGRESS NOTES
Chart reviewed. Noted that patient was seen by Dr. Licha Williamson in 2016.   I have informed patient's nurse to consult Dr. Sabina ward for the GI consult

## 2021-01-28 NOTE — PROGRESS NOTES
Hospitalist Progress Note      PCP: Tamara Carrington    Date of Admission: 1/26/2021    Chief Complaint: Weakness, progressive. Frequent falls    Hospital Course: Admitted with frequent falls. Tentatively diagnosed with UTI. Urine culture showing some growth. Of note, hemoglobin is continuously dropping. More than 2 g drop noted over past 2 days. Patient does not see any blood in stool. States she has been losing weight, no explanation. Subjective: Complains of dizziness with ambulation. No chest pain, no shortness of breath, no nausea or vomiting. No abdominal pain.       Medications:  Reviewed    Infusion Medications    sodium chloride 75 mL/hr at 01/27/21 1150    dextrose       Scheduled Medications    paliperidone  6 mg Oral QAM    lactobacillus  1 capsule Oral Daily with breakfast    aspirin  81 mg Oral Daily    cetirizine  10 mg Oral Daily    fluticasone  2 spray Each Nostril Daily    gabapentin  600 mg Oral TID    insulin glargine  26 Units Subcutaneous QAM    letrozole  2.5 mg Oral Daily    lisinopril  10 mg Oral Daily    pantoprazole  40 mg Oral QAM AC    atorvastatin  20 mg Oral Daily    ticagrelor  90 mg Oral BID    sodium chloride flush  10 mL Intravenous 2 times per day    enoxaparin  40 mg Subcutaneous Daily    insulin lispro  0-12 Units Subcutaneous TID WC    insulin lispro  0-6 Units Subcutaneous Nightly    sulfamethoxazole-trimethoprim  1 tablet Oral 2 times per day     PRN Meds: melatonin, oxyCODONE-acetaminophen, sodium chloride flush, promethazine **OR** ondansetron, magnesium hydroxide, acetaminophen **OR** acetaminophen, glucose, dextrose, glucagon (rDNA), dextrose      Intake/Output Summary (Last 24 hours) at 1/28/2021 1046  Last data filed at 1/28/2021 0121  Gross per 24 hour   Intake 240 ml   Output 700 ml   Net -460 ml       Physical Exam Performed:    /65   Pulse 76   Temp 98.6 °F (37 °C) (Oral)   Resp 16   Ht 5' 3\" (1.6 m)   Wt 115 lb 14.4 oz (52.6 kg)   SpO2 100%   BMI 20.53 kg/m²     General appearance: No apparent distress, appears stated age and cooperative. HEENT: Pupils equal, round, and reactive to light. Conjunctivae/corneas clear. Neck: Supple, with full range of motion. No jugular venous distention. Trachea midline. Respiratory:  Normal respiratory effort. Clear to auscultation, bilaterally without Rales/Wheezes/Rhonchi. Cardiovascular: Regular rate and rhythm with normal S1/S2 without murmurs, rubs or gallops. Abdomen: Soft, non-tender, non-distended with normal bowel sounds. Musculoskeletal: No clubbing, cyanosis or edema bilaterally. Full range of motion without deformity. Skin: Skin color, texture, turgor normal.  No rashes or lesions. Neurologic:  Neurovascularly intact without any focal sensory/motor deficits. Cranial nerves: II-XII intact, grossly non-focal.  Psychiatric: Alert and oriented, thought content appropriate, normal insight  Capillary Refill: Brisk,< 3 seconds   Peripheral Pulses: +2 palpable, equal bilaterally     I examined the patient today (01/28/21). Physical exam is similar to yesterday (1/27). Labs:   Recent Labs     01/26/21  1621 01/27/21  0706 01/28/21  0619   WBC 5.7 6.3 5.6   HGB 10.1* 9.2* 8.1*   HCT 32.5* 29.6* 25.3*    145 116*     Recent Labs     01/26/21  1621 01/27/21  0706 01/28/21  0619   * 140 136   K 4.0 3.5 3.7    107 107   CO2 22 23 23   BUN 15 12 11   CREATININE 1.0 0.9 1.0   CALCIUM 9.7 8.8 8.0*     Recent Labs     01/26/21  1621 01/28/21  0619   AST 34 21   ALT 84* 48*   BILITOT <0.2 <0.2   ALKPHOS 321* 221*     No results for input(s): INR in the last 72 hours.   Recent Labs     01/26/21  1621   TROPONINI <0.01       Urinalysis:      Lab Results   Component Value Date    NITRU POSITIVE 01/26/2021    WBCUA 6-9 01/26/2021    BACTERIA 2+ 11/11/2020    RBCUA 3-4 01/26/2021    BLOODU TRACE-LYSED 01/26/2021    SPECGRAV 1.015 01/26/2021    GLUCOSEU >=1000 01/26/2021 GLUCOSEU >=1000 mg/dL 06/07/2010       Radiology:  CT CERVICAL SPINE WO CONTRAST   Final Result   No acute abnormality of the cervical spine. Focal spondylosis at C5-6. CT HEAD WO CONTRAST   Final Result   No acute intracranial abnormality. Opacification of the right maxillary sinus, suggesting sinusitis. XR FOOT RIGHT (2 VIEWS)   Final Result   Chronic findings are noted. No acute radiographic abnormality is appreciated. XR CHEST (2 VW)   Final Result   No acute cardiopulmonary disease. Assessment/Plan:    Active Hospital Problems    Diagnosis    Frequent falls [R29.6]     PLAN    Progressive weakness  Unclear reason  Noted accompanied by loss of weight and anemia. UTI may not be enough to explain  Patient so GI in the past, endoscopic evaluation was recommended but never done. Progressive anemia  More than 2 g over past 2 days  Iron studies are consistent with chronic illness  No recent endoscopy visible in the chart  Stool guaiac ordered. However, a negative result will not mean much  I will ask GI to evaluate. If not inpatient, patient will need endoscopic evaluation as an outpatient. I will keep the patient in the hospital 1 more day at least, to establish hemoglobin trend. Diabetes mellitus type 2 with hyperglycemia  Hemoglobin A1c is 13.8%, indicating no compliance with diet. Chronic pancreatitis may be playing a role. Continue Lantus and sliding scale insulin  Carb controlled diet started     Abnormal urinalysis, presumed UTI  Awaiting final urine culture results, receiving empiric oral antibiotic in the meantime. There is growth of E. coli but sensitivity is not back yet    Hypertension  Controlled blood pressure. Continue same    Coronary artery disease  Pain-free. Continue antiplatelets and statin    Breast cancer  In remission. Early stage.   Continue letrozole    Dyslipidemia  Continue statin    Discussed with the patient        DVT Prophylaxis: Lovenox  Diet: DIET CARB CONTROL; Dietary Nutrition Supplements: Diabetic Oral Supplement  Code Status: Full Code    PT/OT Eval Status: Requested    Dispo -at least 1 more day to establish hemoglobin trend and allow time for GI consult.     Fuentes Jenkins MD

## 2021-01-28 NOTE — CONSULTS
Gastroenterology Consult Note    Patient:   Wendie Galicia   YOB: 1960   Facility:   [facility]   Referring/PCP: Ephraim Delacruz  Date:     1/28/2021  Consultant:   Sofía Pang MD    Subjective: This 63 y.o. female was admitted 1/26/2021 with a diagnosis of \"Frequent falls [R29.6]\" and is seen in consultation regarding \"anemia\". Information was obtained from interview of  the patient, examination of the patient, and review of records. I did  update the past medical, surgical, social and / or family history. Anemia moderate for weeks in blood assoc w diarrhea/wt loss    Current status  Present  Diet Order: DIET CARB CONTROL; Dietary Nutrition Supplements: Diabetic Oral Supplement  Diet NPO, After Midnight and she is tolerating diet. Recently, she has experienced no abdominal  Pain and she has not required Intravenous narcotic analgesics. The patient has also experienced no constipation, fever, hematochezia, melena, nausea and vomiting      Prior to Admission medications    Medication Sig Start Date End Date Taking? Authorizing Provider   TRUE METRIX BLOOD GLUCOSE TEST strip USE AS DIRECTED TO TEST 4 TIMES A DAY 1/7/21   Mary Lou Candelario MD   JARDIANCE 25 MG tablet TAKE 1 TABLET BY MOUTH EVERY DAY IN THE MORNING 12/3/20   Historical Provider, MD   insulin glargine (LANTUS SOLOSTAR) 100 UNIT/ML injection pen Inject 25 Units into the skin every morning  Patient taking differently: Inject 26 Units into the skin every morning  10/8/20   Bob Paul MD   Liraglutide (VICTOZA) 18 MG/3ML SOPN SC injection Inject 1.8 mg into the skin daily 8/6/20   Mary Lou Candelario MD   nitroGLYCERIN (NITROSTAT) 0.4 MG SL tablet up to max of 3 total doses.  If no relief after 1 dose, call 911. 7/11/20   Senait Dia MD   Insulin Pen Needle (UNIFINE PENTIPS) 32G X 4 MM MISC USE AS DIRECTED 3 TIMES A DAY 6/30/20   Mary Lou Candelario MD   [facility] 90 MG TABS tablet Take 1 tablet by mouth 2 times daily 6/29/20   Julien Parra, APRN - CNP   simvastatin (ZOCOR) 20 MG tablet Take 20 mg by mouth nightly    Historical Provider, MD   cetirizine (ZYRTEC) 10 MG tablet Take 10 mg by mouth daily    Historical Provider, MD   lisinopril (PRINIVIL;ZESTRIL) 10 MG tablet Take 10 mg by mouth daily    Historical Provider, MD   Empagliflozin-metFORMIN HCl 12.5-1000 MG TABS Take 2 tablets by mouth daily 6/25/20   Nyasia Burnette MD   Blood Pressure Monitor GINA Check BP at home once or twice a day 5/1/20   Nyasia Burnette MD   Insulin Syringe-Needle U-100 31G X 5/16\" 0.3 ML MISC USE 3 TIMES A DAY BEFORE MEALS 1/31/20   Nyasia Burnette MD   calcium carbonate-vitamin D (CALTRATE) 600-400 MG-UNIT TABS per tab 1 tablet daily  12/30/19   Historical Provider, MD   letrozole ([address]) 2.5 MG tablet TAKE 1 TABLET BY MOUTH EVERY DAY 2/1/19   Historical Provider, MD   glucagon 1 MG injection Inject 1 mg into the muscle See Admin Instructions Follow package directions for low blood sugar. 2/19/19   Nyasia Burnette MD   paliperidone (INVEGA) 9 MG extended release tablet Take 9 mg by mouth every morning  1/22/18   Historical Provider, MD   aspirin 81 MG tablet Take 81 mg by mouth daily    Historical Provider, MD   gabapentin (NEURONTIN) 600 MG tablet Take 600 mg by mouth 3 times daily    Historical Provider, MD   oxyCODONE-acetaminophen (PERCOCET) 5-325 MG per tablet Take 1 tablet by mouth every 6 hours as needed for Pain. Annette Pedraza     Historical Provider, MD   traZODone (DESYREL) 100 MG tablet Take 100 mg by mouth nightly    Historical Provider, MD      Scheduled Medications:    paliperidone  6 mg Oral QAM    lactobacillus  1 capsule Oral Daily with breakfast    aspirin  81 mg Oral Daily    cetirizine  10 mg Oral Daily    fluticasone  2 spray Each Nostril Daily    gabapentin  600 mg Oral TID    insulin glargine  26 Units Subcutaneous QAM    letrozole  2.5 mg Oral Daily    lisinopril  10 mg Oral Daily    pantoprazole  40 mg Oral QAM AC    atorvastatin  20 mg Oral Daily    ticagrelor  90 mg Oral BID    sodium chloride flush  10 mL Intravenous 2 times per day    enoxaparin  40 mg Subcutaneous Daily    insulin lispro  0-12 Units Subcutaneous TID WC    insulin lispro  0-6 Units Subcutaneous Nightly    sulfamethoxazole-trimethoprim  1 tablet Oral 2 times per day     Infusions:    sodium chloride 75 mL/hr at 01/28/21 1604    dextrose       PRN Medications: melatonin, oxyCODONE-acetaminophen, sodium chloride flush, promethazine **OR** ondansetron, magnesium hydroxide, acetaminophen **OR** acetaminophen, glucose, dextrose, glucagon (rDNA), dextrose  Allergies: Allergies   Allergen Reactions    Morphine Anaphylaxis and Hives     feels like throat is closing    Penicillins Hives and Swelling    Codeine Hives and Rash    Penicillin G Rash       Past Medical History:   Diagnosis Date    Abnormal brain MRI 7/20/2017    Partially empty sella and minimal chronic small vessel ischemic disease    Acute bilateral low back pain without sciatica 11/2/2016    SHARRON (acute kidney injury) ([facility] [address] 7/5/2017    Arthritis     back    Bipolar disorder ([facility] [address] 10/18/2008    CAD (coronary artery disease)     stent placed 6/8/20    Cancer [facility]) 2015    bilateral breast:s/p lumpectomy/radiation:under care care of breast specialist:Dr. Boone     Carotid stenosis, bilateral:<50%:per US 7/2016 7/15/2016    Carpal tunnel syndrome 10/18/2008    Cervical cancer screening 2014    Nml per pt'.     Coronary artery disease of native artery of native heart with stable angina pectoris ([facility] [address] 6/9/2020    DDD (degenerative disc disease), lumbar 7/18/2018    Depression     under care of pschiatrist:Dr. 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 [address] 2017    Microalbuminuria 2016    Neuropathy in diabetes ([facility]     Non morbid obesity 2016    Pancreatitis 16    [facility] hospitalization 16-16:under care of GI:chronic pancreatitis    S/P endoscopy 2016    [facility]:per pt' & her family member was nml.  Scoliosis     Spondylosis of lumbar region without myelopathy or radiculopathy 3/10/2017    Transient cerebral ischemia 07/15/2016    TIA:7/10/16    Unspecified cerebral artery occlusion with cerebral infarction     TIA     Past Surgical History:   Procedure Laterality Date    BREAST LUMPECTOMY      Bilateral:breast cancer    CARDIAC CATHETERIZATION  2020    Dr. Chari Mcgraw), DAYANA to Diag 1    HYSTERECTOMY      Benign:no cervical cancer per pt'    KIDNEY REMOVAL      right    OTHER SURGICAL HISTORY Right     orif right ankle    TUBAL LIGATION         Social:   Social History     Tobacco Use    Smoking status: Former Smoker     Packs/day: 0.50     Years: 20.00     Pack years: 10.00     Types: Cigarettes, Cigars     Quit date: 7/3/2014     Years since quittin.5    Smokeless tobacco: Never Used   Substance Use Topics    Alcohol use: No     Alcohol/week: 0.0 standard drinks     Family:   Family History   Problem Relation Age of Onset    Cancer Mother         breast    Cancer Father     Heart Failure Neg Hx     High Cholesterol Neg Hx     Hypertension Neg Hx     Migraines Neg Hx     Rashes/Skin Problems Neg Hx     Seizures Neg Hx     Stroke Neg Hx     Thyroid Disease Neg Hx     Diabetes Neg Hx        ROS: Pertinent items are noted in HPI.     Objective:   Vital Signs:  Temp (24hrs), Av.2 °F (36.8 °C), Min:97.6 °F (36.4 °C), Max:98.7 °F (65.3 °C)     Systolic (15IQX), EBE:308 , Min:96 , KUP:924      Diastolic (94GDN), YD, Min:56, Max:72     Pulse  Av.3  Min: 53  Max: 76  /62   Pulse 69   Temp 98.7 °F (37.1 °C) (Oral)   Resp 16   Ht 5' 3\" (1.6 m)   Wt 115 lb 14.4 oz (52.6 kg)   SpO2 100%   BMI 20.53 kg/m²      Physical Exam:   /62   Pulse 69   Temp 98.7 °F (37.1 °C) (Oral)   Resp 16   Ht 5' 3\" (1.6 m)   Wt 115 lb 14.4 oz (52.6 kg)   SpO2 100%   BMI 20.53 kg/m²   General appearance: alert, appears stated age and cooperative  Lungs: clear to auscultation bilaterally  Chest wall: no tenderness  Heart: regular rate and rhythm, S1, S2 normal, no murmur, click, rub or gallop  Abdomen: soft, non-tender; bowel sounds normal; no masses,  no organomegaly  Extremities: extremities normal, atraumatic, no cyanosis or edema  Skin: Skin color, texture, turgor normal. No rashes or lesions  Neurologic: Grossly normal    Lab and Imaging Review   Recent Labs     01/26/21  1621 01/27/21  0706 01/28/21  0619   WBC 5.7 6.3 5.6   HGB 10.1* 9.2* 8.1*   MCV 90.2 89.5 89.3    145 116*   * 140 136   K 4.0 3.5 3.7    107 107   CO2 22 23 23   BUN 15 12 11   CREATININE 1.0 0.9 1.0   GLUCOSE 438* 213* 154*   CALCIUM 9.7 8.8 8.0*   PROT 7.3  --  5.9*   LABALBU 3.3*  --  2.7*   AST 34  --  21   ALT 84*  --  48*   ALKPHOS 321*  --  221*   BILITOT <0.2  --  <0.2   MG  --  2.00  --        Assessment:     Patient Active Problem List    Diagnosis Date Noted    Unintentional weight loss 11/05/2020    DKA (diabetic ketoacidoses) ([facility] [address] 10/07/2020    DKA, type 2, not at goal [facility]) 10/07/2020    Hx of heart artery stent 06/19/2020    CAD (coronary artery disease) 06/09/2020    Chest pain 06/05/2020    Hypomagnesemia 01/23/2020    Acute cystitis without hematuria 01/22/2020    History of CVA (cerebrovascular accident) without residual deficits 07/08/2019    S/P mastectomy, right 07/08/2019    Age-related nuclear cataract of both eyes 04/29/2019    Hypermetropia, bilateral 04/29/2019    Hypertensive retinopathy, bilateral 04/29/2019    Vitreous degeneration, bilateral 04/29/2019    Morbid obesity due to excess calories ([address] 02/19/2019    Chronic pain disorder 07/18/2018    DDD (degenerative disc disease), lumbar 07/18/2018    Diabetic polyneuropathy associated with type 2 diabetes mellitus ([address] 07/18/2018    Other secondary scoliosis, lumbosacral region 07/18/2018    Thoracic spondylosis without myelopathy 07/18/2018    Uncontrolled type 2 diabetes mellitus with diabetic nephropathy, with long-term current use of insulin ([address] 12/19/2017    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 ([address] 12/19/2017    Dyslipidemia associated with type 2 diabetes mellitus ([address] 12/19/2017    Closed fracture of right ankle, with routine healing, subsequent encounter 09/17/2017    CKD (chronic kidney disease), stage III 09/17/2017    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 stage 3 chronic kidney disease ([address] 09/17/2017    Uncontrolled type 2 diabetes mellitus with microalbuminuria, with long-term current use of insulin ([address] 09/01/2017    Abnormal brain MRI 07/20/2017    Acute bilateral low back pain without sciatica 07/20/2017    Acute bilateral low back pain with left-sided sciatica 07/20/2017    SHARRON (acute kidney injury) ([address] 07/05/2017    Lactic acidosis 07/05/2017    Frequent falls 07/05/2017    Depression/anxiety 07/05/2017    SOBOE (shortness of breath on exertion) 05/05/2017    History of breast cancer 07/20/2016    History of renal cell carcinoma 07/20/2016    Gallstone pancreatitis 07/18/2016    Carotid stenosis, bilateral:<50%:per US 7/2016 07/15/2016    Hx of ischemic CVA     Brain metastases ([address]     Slurred speech 07/10/2016    Microalbuminuria 07/01/2016    Obesity (BMI 30-39.9) 07/01/2016    Essential hypertension 06/17/2016    Bilateral malignant neoplasm of breast in female [facility]) 06/17/2016    Patient in clinical research study 09/11/2015    Nuclear senile cataract 12/22/2014    Diffuse cystic mastopathy 09/22/2014    Cardiomegaly 03/28/2013    Cardiomyopathy ([facility] [address] 03/28/2013    Chronic systolic heart failure ([facility] [address] 03/28/2013    Edema 03/28/2013    Nonspecific abnormal electrocardiogram (ECG) (EKG) 03/28/2013    Syncope and collapse 03/28/2013    Cardiomyopathy in other diseases classified elsewhere 05/10/2011    Bipolar disorder ([facility] 10/18/2008    Carpal tunnel syndrome 10/18/2008    Gout 10/18/2008    Mononeuritis 10/18/2008    Myalgia and myositis 10/18/2008    Peroneal muscular atrophy 10/18/2008    Scoliosis (and kyphoscoliosis), idiopathic 10/18/2008     64 yo w DM, HTN, CAD on ASA/Brilinta and Wilms' tumor s/p nephrectomy admitted w weakness and UTI, found to have anemia and reports wt loss and some diarrhea. Denies hematochezia or melena. C diff is neg. H/H 8/25, Fe 15%. Plan:   1. Agree w Protonix  2. Will schedule EGD in am  3. Will need colonoscopy in-house if EGD is neg.   4. Will follow    Karel Bucio MD       (O) [phone]

## 2021-01-28 NOTE — PROGRESS NOTES
1/28/2021 1105 GI consult called in at this time and sent out to Dr. Sukumar Hernandez at this time.     -Halie Elise, PCA

## 2021-01-29 ENCOUNTER — ANESTHESIA (OUTPATIENT)
Dept: ENDOSCOPY | Age: 61
DRG: 951 | End: 2021-01-29
Payer: MEDICAID

## 2021-01-29 ENCOUNTER — ANESTHESIA EVENT (OUTPATIENT)
Dept: ENDOSCOPY | Age: 61
DRG: 951 | End: 2021-01-29
Payer: MEDICAID

## 2021-01-29 VITALS — OXYGEN SATURATION: 100 % | SYSTOLIC BLOOD PRESSURE: 116 MMHG | DIASTOLIC BLOOD PRESSURE: 51 MMHG

## 2021-01-29 LAB
A/G RATIO: 0.8 (ref 1.1–2.2)
ALBUMIN SERPL-MCNC: 2.6 G/DL (ref 3.4–5)
ALP BLD-CCNC: 218 U/L (ref 40–129)
ALT SERPL-CCNC: 44 U/L (ref 10–40)
ANION GAP SERPL CALCULATED.3IONS-SCNC: 8 MMOL/L (ref 3–16)
AST SERPL-CCNC: 23 U/L (ref 15–37)
BILIRUB SERPL-MCNC: 0.4 MG/DL (ref 0–1)
BUN BLDV-MCNC: 15 MG/DL (ref 7–20)
CALCIUM SERPL-MCNC: 8 MG/DL (ref 8.3–10.6)
CHLORIDE BLD-SCNC: 108 MMOL/L (ref 99–110)
CO2: 21 MMOL/L (ref 21–32)
CREAT SERPL-MCNC: 1.1 MG/DL (ref 0.6–1.2)
GFR AFRICAN AMERICAN: >60
GFR NON-AFRICAN AMERICAN: 50
GLOBULIN: 3.2 G/DL
GLUCOSE BLD-MCNC: 161 MG/DL (ref 70–99)
GLUCOSE BLD-MCNC: 239 MG/DL (ref 70–99)
GLUCOSE BLD-MCNC: 246 MG/DL (ref 70–99)
GLUCOSE BLD-MCNC: 257 MG/DL (ref 70–99)
GLUCOSE BLD-MCNC: 362 MG/DL (ref 70–99)
HCT VFR BLD CALC: 26.6 % (ref 36–48)
HEMOGLOBIN: 8.1 G/DL (ref 12–16)
MCH RBC QN AUTO: 27.9 PG (ref 26–34)
MCHC RBC AUTO-ENTMCNC: 30.5 G/DL (ref 31–36)
MCV RBC AUTO: 91.4 FL (ref 80–100)
PDW BLD-RTO: 15.8 % (ref 12.4–15.4)
PERFORMED ON: ABNORMAL
PLATELET # BLD: 126 K/UL (ref 135–450)
PMV BLD AUTO: 10.6 FL (ref 5–10.5)
POTASSIUM SERPL-SCNC: 4.2 MMOL/L (ref 3.5–5.1)
RBC # BLD: 2.91 M/UL (ref 4–5.2)
SARS-COV-2, PCR: NOT DETECTED
SODIUM BLD-SCNC: 137 MMOL/L (ref 136–145)
TOTAL PROTEIN: 5.8 G/DL (ref 6.4–8.2)
WBC # BLD: 4.9 K/UL (ref 4–11)

## 2021-01-29 PROCEDURE — 97116 GAIT TRAINING THERAPY: CPT

## 2021-01-29 PROCEDURE — 1200000000 HC SEMI PRIVATE

## 2021-01-29 PROCEDURE — 0DB98ZX EXCISION OF DUODENUM, VIA NATURAL OR ARTIFICIAL OPENING ENDOSCOPIC, DIAGNOSTIC: ICD-10-PCS | Performed by: INTERNAL MEDICINE

## 2021-01-29 PROCEDURE — 2709999900 HC NON-CHARGEABLE SUPPLY: Performed by: INTERNAL MEDICINE

## 2021-01-29 PROCEDURE — 6370000000 HC RX 637 (ALT 250 FOR IP): Performed by: INTERNAL MEDICINE

## 2021-01-29 PROCEDURE — 36415 COLL VENOUS BLD VENIPUNCTURE: CPT

## 2021-01-29 PROCEDURE — 2580000003 HC RX 258: Performed by: NURSE PRACTITIONER

## 2021-01-29 PROCEDURE — 85027 COMPLETE CBC AUTOMATED: CPT

## 2021-01-29 PROCEDURE — 7100000010 HC PHASE II RECOVERY - FIRST 15 MIN: Performed by: INTERNAL MEDICINE

## 2021-01-29 PROCEDURE — 6370000000 HC RX 637 (ALT 250 FOR IP): Performed by: NURSE PRACTITIONER

## 2021-01-29 PROCEDURE — 3609012400 HC EGD TRANSORAL BIOPSY SINGLE/MULTIPLE: Performed by: INTERNAL MEDICINE

## 2021-01-29 PROCEDURE — 88305 TISSUE EXAM BY PATHOLOGIST: CPT

## 2021-01-29 PROCEDURE — 6360000002 HC RX W HCPCS: Performed by: NURSE ANESTHETIST, CERTIFIED REGISTERED

## 2021-01-29 PROCEDURE — 2500000003 HC RX 250 WO HCPCS: Performed by: NURSE ANESTHETIST, CERTIFIED REGISTERED

## 2021-01-29 PROCEDURE — 7100000011 HC PHASE II RECOVERY - ADDTL 15 MIN: Performed by: INTERNAL MEDICINE

## 2021-01-29 PROCEDURE — 80053 COMPREHEN METABOLIC PANEL: CPT

## 2021-01-29 PROCEDURE — 3700000000 HC ANESTHESIA ATTENDED CARE: Performed by: INTERNAL MEDICINE

## 2021-01-29 RX ORDER — PROPOFOL 10 MG/ML
INJECTION, EMULSION INTRAVENOUS PRN
Status: DISCONTINUED | OUTPATIENT
Start: 2021-01-29 | End: 2021-01-29 | Stop reason: SDUPTHER

## 2021-01-29 RX ORDER — LIDOCAINE HYDROCHLORIDE 20 MG/ML
INJECTION, SOLUTION INFILTRATION; PERINEURAL PRN
Status: DISCONTINUED | OUTPATIENT
Start: 2021-01-29 | End: 2021-01-29 | Stop reason: SDUPTHER

## 2021-01-29 RX ADMIN — SULFAMETHOXAZOLE AND TRIMETHOPRIM 1 TABLET: 800; 160 TABLET ORAL at 23:38

## 2021-01-29 RX ADMIN — Medication 1 CAPSULE: at 12:31

## 2021-01-29 RX ADMIN — INSULIN GLARGINE 26 UNITS: 100 INJECTION, SOLUTION SUBCUTANEOUS at 12:37

## 2021-01-29 RX ADMIN — FLUTICASONE PROPIONATE 2 SPRAY: 50 SPRAY, METERED NASAL at 12:34

## 2021-01-29 RX ADMIN — LISINOPRIL 10 MG: 10 TABLET ORAL at 12:31

## 2021-01-29 RX ADMIN — GABAPENTIN 600 MG: 300 CAPSULE ORAL at 23:38

## 2021-01-29 RX ADMIN — PALIPERIDONE 6 MG: 3 TABLET, EXTENDED RELEASE ORAL at 12:32

## 2021-01-29 RX ADMIN — GABAPENTIN 600 MG: 300 CAPSULE ORAL at 13:57

## 2021-01-29 RX ADMIN — SODIUM CHLORIDE: 9 INJECTION, SOLUTION INTRAVENOUS at 14:53

## 2021-01-29 RX ADMIN — LIDOCAINE HYDROCHLORIDE 60 MG: 20 INJECTION, SOLUTION INFILTRATION; PERINEURAL at 10:20

## 2021-01-29 RX ADMIN — ATORVASTATIN CALCIUM 20 MG: 10 TABLET, FILM COATED ORAL at 12:32

## 2021-01-29 RX ADMIN — INSULIN LISPRO 4 UNITS: 100 INJECTION, SOLUTION INTRAVENOUS; SUBCUTANEOUS at 12:39

## 2021-01-29 RX ADMIN — LETROZOLE 2.5 MG: 2.5 TABLET ORAL at 12:32

## 2021-01-29 RX ADMIN — SODIUM CHLORIDE, PRESERVATIVE FREE 10 ML: 5 INJECTION INTRAVENOUS at 23:00

## 2021-01-29 RX ADMIN — SODIUM CHLORIDE: 9 INJECTION, SOLUTION INTRAVENOUS at 10:18

## 2021-01-29 RX ADMIN — Medication 5 MG: at 23:38

## 2021-01-29 RX ADMIN — BISACODYL 20 MG: 5 TABLET, COATED ORAL at 15:51

## 2021-01-29 RX ADMIN — POLYETHYLENE GLYCOL 3350, SODIUM SULFATE ANHYDROUS, SODIUM BICARBONATE, SODIUM CHLORIDE, POTASSIUM CHLORIDE 2000 ML: 236; 22.74; 6.74; 5.86; 2.97 POWDER, FOR SOLUTION ORAL at 17:37

## 2021-01-29 RX ADMIN — PROPOFOL 30 MG: 10 INJECTION, EMULSION INTRAVENOUS at 10:20

## 2021-01-29 RX ADMIN — SULFAMETHOXAZOLE AND TRIMETHOPRIM 1 TABLET: 800; 160 TABLET ORAL at 12:31

## 2021-01-29 RX ADMIN — CETIRIZINE HYDROCHLORIDE 10 MG: 10 TABLET, FILM COATED ORAL at 12:31

## 2021-01-29 RX ADMIN — INSULIN LISPRO 10 UNITS: 100 INJECTION, SOLUTION INTRAVENOUS; SUBCUTANEOUS at 17:39

## 2021-01-29 RX ADMIN — OXYCODONE AND ACETAMINOPHEN 1 TABLET: 5; 325 TABLET ORAL at 13:57

## 2021-01-29 RX ADMIN — PANTOPRAZOLE SODIUM 40 MG: 40 TABLET, DELAYED RELEASE ORAL at 05:45

## 2021-01-29 ASSESSMENT — PAIN DESCRIPTION - ORIENTATION: ORIENTATION: RIGHT

## 2021-01-29 ASSESSMENT — PAIN DESCRIPTION - DESCRIPTORS: DESCRIPTORS: TENDER

## 2021-01-29 ASSESSMENT — PAIN SCALES - GENERAL
PAINLEVEL_OUTOF10: 0
PAINLEVEL_OUTOF10: 7

## 2021-01-29 ASSESSMENT — PAIN DESCRIPTION - PAIN TYPE: TYPE: ACUTE PAIN

## 2021-01-29 ASSESSMENT — PAIN DESCRIPTION - LOCATION: LOCATION: FOOT

## 2021-01-29 ASSESSMENT — ENCOUNTER SYMPTOMS: SHORTNESS OF BREATH: 1

## 2021-01-29 NOTE — FLOWSHEET NOTE
Advance Care Planning     General Advance Care Planning (ACP) Conversation    Date of Conversation: 1/26/2021  Conducted with: Patient with Decision Making Capacity    Healthcare Decision Maker:      Primary Decision Maker: Lucy Garcia - Other - [phone]    Secondary Decision Maker: Maurice Fried - Child - [phone]    Supplemental (Other) Decision Maker: Damaris Cunningham - Other - [phone]    Click here to complete [address] including selection of the Healthcare Decision Maker Relationship (ie \"Primary\")  Today we documented Decision Maker(s) consistent with Legal Next of Kin hierarchy. Content/Action Overview:  Has NO ACP documents/care preferences - information provided, considering goals and options  Reviewed DNR/DNI and patient elects Full Code (Attempt Resuscitation)  treatment goals  Stanton Osei is primary decision maker.     Length of Voluntary ACP Conversation in minutes:  45 minutes    Lillis Severe

## 2021-01-29 NOTE — ANESTHESIA PRE PROCEDURE
Department of Anesthesiology  Preprocedure Note       Name:  Selam Hudson   Age:  63 y.o.  :  1960                                          MRN:  [id number]         Date:  2021      Surgeon: Chaitanya dHz):  Ignacia Acosta MD    Procedure: Procedure(s):  EGD ESOPHAGOGASTRODUODENOSCOPY    Medications prior to admission:   Prior to Admission medications    Medication Sig Start Date End Date Taking? Authorizing Provider   TRUE METRIX BLOOD GLUCOSE TEST strip USE AS DIRECTED TO TEST 4 TIMES A DAY 21   Kenna Cuevas MD   JARDIANCE 25 MG tablet TAKE 1 TABLET BY MOUTH EVERY DAY IN THE MORNING 12/3/20   Historical Provider, MD   insulin glargine (LANTUS SOLOSTAR) 100 UNIT/ML injection pen Inject 25 Units into the skin every morning  Patient taking differently: Inject 26 Units into the skin every morning  10/8/20   Binnie Hodgkin, MD   Liraglutide (VICTOZA) 18 MG/3ML SOPN SC injection Inject 1.8 mg into the skin daily 20   Kenna Cuevas MD   nitroGLYCERIN (NITROSTAT) 0.4 MG SL tablet up to max of 3 total doses.  If no relief after 1 dose, call 911. 20   Lorenzo Funez MD   Insulin Pen Needle (UNIFINE PENTIPS) 32G X 4 MM MISC USE AS DIRECTED 3 TIMES A DAY 20   Kenna Cuevas MD   ticagrelor (BRILINTA) 90 MG TABS tablet Take 1 tablet by mouth 2 times daily 20   SARTHAK Bowen - CNP   simvastatin (ZOCOR) 20 MG tablet Take 20 mg by mouth nightly    Historical Provider, MD   cetirizine (ZYRTEC) 10 MG tablet Take 10 mg by mouth daily    Historical Provider, MD   lisinopril (PRINIVIL;ZESTRIL) 10 MG tablet Take 10 mg by mouth daily    Historical Provider, MD   Empagliflozin-metFORMIN HCl 12.5-1000 MG TABS Take 2 tablets by mouth daily 20   Kenna Cuevas MD   Blood Pressure Monitor GINA Check BP at home once or twice a day 20   Kenna Cuevas MD   Insulin Syringe-Needle U-100 31G X 5/16\" 0.3 ML MISC USE 3 TIMES A DAY BEFORE MEALS 20 Abigail Collins MD   calcium carbonate-vitamin D (CALTRATE) 600-400 MG-UNIT TABS per tab 1 tablet daily  12/30/19   Historical Provider, MD   letrozole ([facility]) 2.5 MG tablet TAKE 1 TABLET BY MOUTH EVERY DAY 2/1/19   Historical Provider, MD   glucagon 1 MG injection Inject 1 mg into the muscle See Admin Instructions Follow package directions for low blood sugar. 2/19/19   Abigail Collins MD   paliperidone (INVEGA) 9 MG extended release tablet Take 9 mg by mouth every morning  1/22/18   Historical Provider, MD   aspirin 81 MG tablet Take 81 mg by mouth daily    Historical Provider, MD   gabapentin (NEURONTIN) 600 MG tablet Take 600 mg by mouth 3 times daily    Historical Provider, MD   oxyCODONE-acetaminophen (PERCOCET) 5-325 MG per tablet Take 1 tablet by mouth every 6 hours as needed for Pain. Kevin Valle     Historical Provider, MD   traZODone (DESYREL) 100 MG tablet Take 100 mg by mouth nightly    Historical Provider, MD       Current medications:    Current Facility-Administered Medications   Medication Dose Route Frequency Provider Last Rate Last Admin    melatonin disintegrating tablet 5 mg  5 mg Oral Nightly PRN Patrickellyn Sheaprd APRN - NP   5 mg at 01/28/21 0055    paliperidone (INVEGA) extended release tablet 6 mg  6 mg Oral QAM Lindsey Walton MD   6 mg at 01/28/21 0927    oxyCODONE-acetaminophen (PERCOCET) 5-325 MG per tablet 1 tablet  1 tablet Oral Q6H PRN Lindsey Walton MD   1 tablet at 01/28/21 2036    lactobacillus (CULTURELLE) capsule 1 capsule  1 capsule Oral Daily with breakfast Lindsey Walton MD   1 capsule at 01/28/21 6945    aspirin chewable tablet 81 mg  81 mg Oral Daily Patrick Devyn APRN - NP   81 mg at 01/28/21 8520    cetirizine (ZYRTEC) tablet 10 mg  10 mg Oral Daily Patrick MISHEL ShepardN - NP   10 mg at 01/28/21 0921    fluticasone (FLONASE) 50 MCG/ACT nasal spray 2 spray  2 spray Each Nostril Daily Patrick SARTHAK Shepard - NP   2 spray at 01/28/21 0920    gabapentin MISHEL BlackN - NP        glucagon (rDNA) injection 1 mg  1 mg Intramuscular PRN [place] Carloschris, APRN - NP        dextrose 5 % solution  100 mL/hr Intravenous PRN Song Felty, APRN - NP        insulin lispro (HUMALOG) injection vial 0-12 Units  0-12 Units Subcutaneous TID WC [place] Felty, APRN - NP   2 Units at 01/28/21 1728    insulin lispro (HUMALOG) injection vial 0-6 Units  0-6 Units Subcutaneous Nightly [place] Carlosy, APRN - NP   2 Units at 01/28/21 2037    sulfamethoxazole-trimethoprim (BACTRIM DS;SEPTRA DS) 800-160 MG per tablet 1 tablet  1 tablet Oral 2 times per day [place] Carlosy, APRN - NP   1 tablet at 01/28/21 2036       Allergies: Allergies   Allergen Reactions    Morphine Anaphylaxis and Hives     feels like throat is closing    Penicillins Hives and Swelling    Codeine Hives and Rash    Penicillin G Rash       Problem List:    Patient Active Problem List   Diagnosis Code    Essential hypertension I10    Bilateral malignant neoplasm of breast in female ([facility] C50.911, C50.912    Microalbuminuria R80.9    Obesity (BMI 30-39. 9) E66.9    Slurred speech R47.81    Hx of ischemic CVA I63.40    Brain metastases ([facility]) C79.31    Carotid stenosis, bilateral:<50%:per US 7/2016 I65.23    SHARRON (acute kidney injury) ([facility]) N17.9    Lactic acidosis E87.2    Frequent falls R29.6    Depression/anxiety F41.8    Abnormal brain MRI R90.89    Acute bilateral low back pain without sciatica M54.5    Uncontrolled type 2 diabetes mellitus with microalbuminuria, with long-term current use of insulin ([facility]) E11.29, E11.65, R80.9, Z79.4    Closed fracture of right ankle, with routine healing, subsequent encounter S82.891D    CKD (chronic kidney disease), stage III N18.30    Uncontrolled type 2 diabetes mellitus with diabetic nephropathy, with long-term current use of insulin ([facility]) E11.21, E11.65, Z79.4    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 ([facility] E11.59    Dyslipidemia associated with type 2 diabetes mellitus (HCC) E11.69, E78.5    Bipolar disorder ([facility] F31.9    Cardiomegaly I51.7    Cardiomyopathy ([facility] I42.9    Carpal tunnel syndrome G56.00    Chronic systolic heart failure (HCC) I50.22    Diffuse cystic mastopathy N60.19    Edema R60.9    Gallstone pancreatitis K85.10    Gout M10.9    History of breast cancer Z85.3    History of renal cell carcinoma Z85.528    Cardiomyopathy in other diseases classified elsewhere I43    Mononeuritis G58.9    Myalgia and myositis [id number]    Nonspecific abnormal electrocardiogram (ECG) (EKG) R94.31    Patient in clinical research study Z00.6    Peroneal muscular atrophy G60.0    Scoliosis (and kyphoscoliosis), idiopathic M41.20    SOBOE (shortness of breath on exertion) R06.02    Syncope and collapse R55    Chronic pain disorder G89.4    DDD (degenerative disc disease), lumbar M51.36    Diabetic polyneuropathy associated with type 2 diabetes mellitus (HCC) E11.42    Other secondary scoliosis, lumbosacral region M41.57    Thoracic spondylosis without myelopathy M47.814    Morbid obesity due to excess calories ([facility]) E66.01    Age-related nuclear cataract of both eyes H25.13    Hypermetropia, bilateral H52.03    Hypertensive retinopathy, bilateral H35.033    Vitreous degeneration, bilateral H43.813    Acute bilateral low back pain with left-sided sciatica M54.42    History of CVA (cerebrovascular accident) without residual deficits Z86.73    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 stage 3 chronic kidney disease (HCC) I13.0, I50.22, N18.30    S/P mastectomy, right Z90.11    Acute cystitis without hematuria N30.00    Hypomagnesemia E83.42    Chest pain R07.9    CAD (coronary artery disease) I25.10    DKA (diabetic ketoacidoses) ([facility]) E11.10    DKA, type 2, not at goal ([facility] E11.10    Hx of heart artery stent Z95.5    Nuclear senile cataract H25.10    Unintentional weight loss R63.4 Past Medical History:        Diagnosis Date    Abnormal brain MRI 7/20/2017    Partially empty sella and minimal chronic small vessel ischemic disease    Acute bilateral low back pain without sciatica 11/2/2016    SHARRON (acute kidney injury) ([facility] 7/5/2017    Arthritis     back    Bipolar disorder ([address] 10/18/2008    CAD (coronary artery disease)     stent placed 6/8/20    Cancer [facility]) 2015    bilateral breast:s/p lumpectomy/radiation:under care care of breast specialist:Dr. Boone     Carotid stenosis, bilateral:<50%:per US 7/2016 7/15/2016    Carpal tunnel syndrome 10/18/2008    Cervical cancer screening 2014    Nml per pt'.  Coronary artery disease of native artery of native heart with stable angina pectoris ([facility] 6/9/2020    DDD (degenerative disc disease), lumbar 7/18/2018    Depression     under care of pschiatrist:Dr. Kamron Salcedo    Depression/anxiety 7/5/2017    Depression/anxiety     Diabetes mellitus ([facility]     Gout     History of mammogram 10/28/2016;8/14/17    Negative    Hyperlipidemia     Hypertension     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 stage 3 chronic kidney disease ([address] 9/17/2017    Microalbuminuria 7/1/2016    Neuropathy in diabetes ([facility]     Non morbid obesity 7/1/2016    Pancreatitis 5/12/16    [facility] hospitalization 5/12/16-5/16/16:under care of GI:chronic pancreatitis    S/P endoscopy 6/14/2016    [facility]:per pt' & her family member was nml.     Scoliosis     Spondylosis of lumbar region without myelopathy or radiculopathy 3/10/2017    Transient cerebral ischemia 07/15/2016    TIA:7/10/16    Unspecified cerebral artery occlusion with cerebral infarction     TIA       Past Surgical History:        Procedure Laterality Date    BREAST LUMPECTOMY  2015    Bilateral:breast cancer    CARDIAC CATHETERIZATION  06/08/2020    Dr. Sheng Adamson), DAYANA to Diag 1    HYSTERECTOMY      Benign:no cervical cancer per pt'  KIDNEY REMOVAL      right    OTHER SURGICAL HISTORY Right     orif right ankle    TUBAL LIGATION         Social History:    Social History     Tobacco Use    Smoking status: Former Smoker     Packs/day: 0.50     Years: 20.00     Pack years: 10.00     Types: Cigarettes, Cigars     Quit date: 7/3/2014     Years since quittin.5    Smokeless tobacco: Never Used   Substance Use Topics    Alcohol use: No     Alcohol/week: 0.0 standard drinks                                Counseling given: Not Answered      Vital Signs (Current):   Vitals:    21 1507 21 1934 21 2255 21 0331   BP: 107/62 110/70 (!) 96/56 96/62   Pulse: 69 70 70 63   Resp: 16 16 16 16   Temp: 98.7 °F (37.1 °C) 99.5 °F (37.5 °C) 99.4 °F (37.4 °C) 99 °F (37.2 °C)   TempSrc: Oral Oral Oral Oral   SpO2: 100% 97% 97% 99%   Weight:       Height:                                                  BP Readings from Last 3 Encounters:   21 96/62   21 (!) 101/46   12/10/20 138/60       NPO Status:                                                                                 BMI:   Wt Readings from Last 3 Encounters:   21 115 lb 14.4 oz (52.6 kg)   12/10/20 128 lb 6.4 oz (58.2 kg)   20 143 lb (64.9 kg)     Body mass index is 20.53 kg/m².     CBC:   Lab Results   Component Value Date    WBC 5.6 2021    RBC 2.84 2021    HGB 8.1 2021    HCT 25.3 2021    MCV 89.3 2021    RDW 15.9 2021     2021       CMP:   Lab Results   Component Value Date     2021    K 3.7 2021    K 3.5 2021     2021    CO2 23 2021    BUN 11 2021    CREATININE 1.0 2021    GFRAA >60 2021    GFRAA >60 2013    AGRATIO 0.8 2021    LABGLOM 56 2021    GLUCOSE 154 2021    PROT 5.9 2021    PROT 8.1 2013    CALCIUM 8.0 2021    BILITOT <0.2 2021    ALKPHOS 221 2021    AST 21 2021    ALT 48 01/28/2021       POC Tests:   Recent Labs     01/28/21 2040   POCGLU 233*       Coags:   Lab Results   Component Value Date    PROTIME 11.4 07/05/2017    INR 1.01 07/05/2017    APTT 29.0 07/17/2010       HCG (If Applicable): No results found for: PREGTESTUR, PREGSERUM, HCG, HCGQUANT     ABGs:   Lab Results   Component Value Date    PHART 7.414 06/26/2015    PO2ART 66.8 06/26/2015    UZW8RPF 42.0 06/26/2015    WRT6NKL 26.3 06/26/2015    BEART 1.5 06/26/2015    F9ROVKUZ 93.4 06/26/2015        Type & Screen (If Applicable):  No results found for: LABABO, LABRH    Drug/Infectious Status (If Applicable):  No results found for: HIV, HEPCAB    COVID-19 Screening (If Applicable):   Lab Results   Component Value Date    COVID19 Not Detected 01/28/2021    COVID19 NOT DETECTED 08/06/2020         Anesthesia Evaluation  Patient summary reviewed and Nursing notes reviewed no history of anesthetic complications:   Airway: Mallampati: II  TM distance: >3 FB   Neck ROM: full  Mouth opening: > = 3 FB Dental:    (+) upper dentures and edentulous      Pulmonary:   (+) shortness of breath:                             Cardiovascular:    (+) hypertension:, angina:, CAD:, CHF (cardiomyopathy): systolic, CHAMBERS:,                   Neuro/Psych:   (+) neuromuscular disease:, TIA, psychiatric history (bipolar):   (-) CVA           GI/Hepatic/Renal:   (+) renal disease: CRI,      (-) GERD and liver disease       Endo/Other:    (+) DiabetesType II DM, using insulin, malignancy/cancer (breast, renal). Abdominal:           Vascular: negative vascular ROS. Anesthesia Plan      TIVA     ASA 3       Induction: intravenous. Anesthetic plan and risks discussed with patient. Plan discussed with CRNA. All questions answered and agrees with plan.         Ela Dolan MD   1/29/2021

## 2021-01-29 NOTE — H&P
20 mg Oral Daily Omar Vel, APRN - NP   20 mg at 01/28/21 0920    ticagrelor (BRILINTA) tablet 90 mg  90 mg Oral BID Omar Vel, APRN - NP   90 mg at 01/28/21 2036    0.9 % sodium chloride infusion   Intravenous Continuous Omar Vel, APRN - NP 75 mL/hr at 01/28/21 1604 New Bag at 01/28/21 1604    sodium chloride flush 0.9 % injection 10 mL  10 mL Intravenous 2 times per day Omar Vel, APRN - NP   10 mL at 01/27/21 2049    sodium chloride flush 0.9 % injection 10 mL  10 mL Intravenous PRN Omar Vel, APRN - NP        enoxaparin (LOVENOX) injection 40 mg  40 mg Subcutaneous Daily Omar Vel, APRN - NP   40 mg at 01/28/21 7739    promethazine (PHENERGAN) tablet 12.5 mg  12.5 mg Oral Q6H PRN Omar Vel, APRN - NP        Or    ondansetron (ZOFRAN) injection 4 mg  4 mg Intravenous Q6H PRN Omar Vel, APRN - NP        magnesium hydroxide (MILK OF MAGNESIA) 400 MG/5ML suspension 30 mL  30 mL Oral Daily PRN Omar Vel, APRN - NP        acetaminophen (TYLENOL) tablet 650 mg  650 mg Oral Q6H PRN Omar Vel, APRN - NP   650 mg at 01/28/21 2259    Or    acetaminophen (TYLENOL) suppository 650 mg  650 mg Rectal Q6H PRN Omar Vel, APRN - NP        glucose (GLUTOSE) 40 % oral gel 15 g  15 g Oral PRN Omar Vel, APRN - NP        dextrose 50 % IV solution  12.5 g Intravenous PRN Omar Vel, APRN - NP        glucagon (rDNA) injection 1 mg  1 mg Intramuscular PRN Omar Vel, APRN - NP        dextrose 5 % solution  100 mL/hr Intravenous PRN Omar Vel, APRN - NP        insulin lispro (HUMALOG) injection vial 0-12 Units  0-12 Units Subcutaneous TID WC Omar Vel, APRN - NP   2 Units at 01/28/21 1728    insulin lispro (HUMALOG) injection vial 0-6 Units  0-6 Units Subcutaneous Nightly Omar Vel, APRN - NP   2 Units at 01/28/21 2037    sulfamethoxazole-trimethoprim (BACTRIM DS;SEPTRA DS) 800-160 MG per tablet 1 tablet  1 tablet Oral 2 times per day SARTHAK De La Cruz - NP   1 tablet at 01/28/21 2036     Past Medical History:   Diagnosis Date    Abnormal brain MRI 7/20/2017    Partially empty sella and minimal chronic small vessel ischemic disease    Acute bilateral low back pain without sciatica 11/2/2016    SHARRON (acute kidney injury) ([facility] 7/5/2017    Arthritis     back    Bipolar disorder ([facility] 10/18/2008    CAD (coronary artery disease)     stent placed 6/8/20    Cancer [facility]) 2015    bilateral breast:s/p lumpectomy/radiation:under care care of breast specialist:Dr. Boone     Carotid stenosis, bilateral:<50%:per US 7/2016 7/15/2016    Carpal tunnel syndrome 10/18/2008    Cervical cancer screening 2014    Nml per pt'.  Coronary artery disease of native artery of native heart with stable angina pectoris ([facility] 6/9/2020    DDD (degenerative disc disease), lumbar 7/18/2018    Depression     under care of pschiatrist:Dr. Lance Mosquera    Depression/anxiety 7/5/2017    Depression/anxiety     Diabetes mellitus ([facility]     Gout     History of mammogram 10/28/2016;8/14/17    Negative    Hyperlipidemia     Hypertension     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 stage 3 chronic kidney disease ([address] 9/17/2017    Microalbuminuria 7/1/2016    Neuropathy in diabetes ([facility]     Non morbid obesity 7/1/2016    Pancreatitis 5/12/16    [facility] hospitalization 5/12/16-5/16/16:under care of GI:chronic pancreatitis    S/P endoscopy 6/14/2016    [facility]:per pt' & her family member was nml.     Scoliosis     Spondylosis of lumbar region without myelopathy or radiculopathy 3/10/2017    Transient cerebral ischemia 07/15/2016    TIA:7/10/16    Unspecified cerebral artery occlusion with cerebral infarction     TIA     Past Surgical History:   Procedure Laterality Date    BREAST LUMPECTOMY  2015    Bilateral:breast cancer    CARDIAC CATHETERIZATION  06/08/2020    Dr. Susan Jay), DAYANA to Diag 1    HYSTERECTOMY      Benign:no cervical cancer per pt'    KIDNEY REMOVAL      right    OTHER SURGICAL HISTORY Right     orif right ankle    TUBAL LIGATION         Nurses notes reviewed and agreed. Medications reviewed  Allergies: Allergies   Allergen Reactions    Morphine Anaphylaxis and Hives     feels like throat is closing    Penicillins Hives and Swelling    Codeine Hives and Rash    Penicillin G Rash           Physical Exam:  Vital Signs: /71   Pulse 64   Temp 99.3 °F (37.4 °C) (Oral)   Resp 16   Ht 5' 3\" (1.6 m)   Wt 115 lb 14.4 oz (52.6 kg)   SpO2 97%   BMI 20.53 kg/m²  Body mass index is 20.53 kg/m². Airway:Normal  Cardiac:Normal  Pulmonary:Normal  Abdomen:Normal  Specific to procedure: none      Pre-Procedure Assessment/Plan:  ASA 3 - Patient with moderate systemic disease with functional limitations  Mallampati II  Level of Sedation Plan:Deep sedation    Post Procedure plan: Return to same level of care    I assessed the patient and find that the patient is in satisfactory condition to proceed with the planned procedure and sedation plan. I have explained the risk, benefits, and alternatives to the procedure. The patient understands and agrees to proceed.   Yes    Yair Leonard MD       (O) [phone]          Yair Leonard  9:28 AM 1/29/2021

## 2021-01-29 NOTE — CARE COORDINATION
Pt accepted by [facility]. Pt will speak w/Shala to determine if she would be willing to do therapy there vs [facility]. Per staff, pt is very unsteady on her feet and likely will fall if sent home. If pt refuses, [facility] will follow. Pt will need rapid on day of d/c. Chemo meds will need to be filled about brought w/pt. CM will follow up w/pt regarding determination.   Crispin Jameson RN

## 2021-01-29 NOTE — PROGRESS NOTES
Physical Therapy  Facility/Department: [facility] C5 - MED SURG/ORTHO  Daily Treatment Note  NAME: Warren Santos  : 1960  MRN: [id number]    Date of Service: 2021    Discharge Recommendations:  Subacute/Skilled Nursing Facility   PT Equipment Recommendations  Equipment Needed: No(pt stated she has walker at home.)    If pt is unable to be seen after this session, please let this note serve as discharge summary. Please see case management note for discharge disposition. Thank you. Assessment   Assessment: Session was limited by transport arriving. Presented with good trunk and LE control during bed mobility with the bed flat and without use of the handrails. Transfers and gait training were unsteady and unsafe; requiring use of RW to assist with balance. Treatment Diagnosis: impaired functional mobility  Prognosis: Good  Decision Making: Low Complexity  Patient Education: Pt educated on safe mobility with RW to improve balance -- pt verbalized understanding  Barriers to Learning: none  REQUIRES PT FOLLOW UP: Yes     Patient Diagnosis(es): The primary encounter diagnosis was Acute cystitis with hematuria. Diagnoses of Frequent falls, Closed head injury, initial encounter, and Generalized weakness were also pertinent to this visit.      has a past medical history of Abnormal brain MRI, Acute bilateral low back pain without sciatica, SHARRON (acute kidney injury) ([address] Arthritis, Bipolar disorder ([address] CAD (coronary artery disease), Cancer ([address] Carotid stenosis, bilateral:<50%:per [place] 2016, Carpal tunnel syndrome, Cervical cancer screening, Coronary artery disease of native artery of native heart with stable angina pectoris ([address] DDD (degenerative disc disease), lumbar, Depression, Depression/anxiety, Depression/anxiety, Diabetes mellitus ([address] Gout, History of mammogram, Hyperlipidemia, Hypertension, 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 stage 3 chronic kidney disease ([facility] Microalbuminuria, Neuropathy in diabetes ([facility] Non morbid obesity, Pancreatitis, S/P endoscopy, Scoliosis, Spondylosis of lumbar region without myelopathy or radiculopathy, Transient cerebral ischemia, and Unspecified cerebral artery occlusion with cerebral infarction. has a past surgical history that includes Kidney removal; Hysterectomy; Breast lumpectomy (2015); Tubal ligation; other surgical history (Right); and Cardiac catheterization (06/08/2020). Restrictions  Restrictions/Precautions  Restrictions/Precautions: Fall Risk, General Precautions  Position Activity Restriction  Other position/activity restrictions: up with assist  Subjective   General  Chart Reviewed: Yes  Referring Practitioner: ALANA Borjas  Subjective  Subjective: Pt agreeable to therapy  Pain Screening  Patient Currently in Pain: No  Vital Signs  Patient Currently in Pain: No       Orientation  Orientation  Overall Orientation Status: Within Functional Limits  Cognition      Objective   Bed mobility  Supine to Sit: Stand by assistance  Scooting: Stand by assistance  Transfers  Sit to Stand: Contact guard assistance  Stand to sit: Contact guard assistance  Ambulation  Ambulation?: Yes  Ambulation 1  Surface: level tile  Device: Rolling Walker  Assistance: Contact guard assistance;Minimal assistance  Quality of Gait: CGA-min A for balance with RW d/t increased M/L sway and the occasional posterior lean. Pt with decreased proprioception bilateral feet with uneven step length and foot clearance. No overt LOB  Distance: 50ft     Balance  Sitting - Static: Good  Sitting - Dynamic: Fair;+  Standing - Static: Fair  Standing - Dynamic: Fair;-  Exercises  Comments: Sitting EOB for approx 5 mins.      AM-PAC Score  AM-PAC Inpatient Mobility Raw Score : 18 (01/29/21 0958)  AM-PAC Inpatient T-Scale Score : 43.63 (01/29/21 0958)  Mobility Inpatient CMS 0-100% Score: 46.58 (01/29/21 8196)  Mobility Inpatient CMS G-Code Modifier : CK (01/29/21 8068)          Goals  Short term goals  Time Frame for Short term goals: 1 week (2/03) unless otherwise specified  Short term goal 1: Pt will be mod I with bed mobility. 1/29 SBA  Short term goal 2: Pt will be supervision for transfers with RW. 1/29 CGA  Short term goal 3: Pt will ambulate 150 ft with supervision and RW. 1/29 50FT   Short term goal 4: Pt will negotiate 4 stairs with rail and SBA (if d/c home). 1/29 NT   Short term goal 5: 1/31: Pt will participate in 12-15 reps of BLE exercises to promote strength and activity tolerance. 1/29 NT   Patient Goals   Patient goals : \"to go home\"    Plan    Plan  Times per week: 3-5x/wk  Times per day: Daily  Specific instructions for Next Treatment: progress mobility as tolerated  Current Treatment Recommendations: Strengthening, Neuromuscular Re-education, Safety Education & Training, Balance Training, Endurance Training, Functional Mobility Training, Transfer Training, Gait Training, Equipment Evaluation, Education, & procurement, Stair training, Patient/Caregiver Education & Training  Safety Devices  Type of devices:  All fall risk precautions in place, Gait belt, Patient at risk for falls, Nurse notified, Bed alarm in place, Left in bed     Therapy Time   Individual Concurrent Group Co-treatment   Time In 0930         Time Out 0945         Minutes 15         Timed Code Treatment Minutes: [address], PTA

## 2021-01-29 NOTE — CARE COORDINATION
CM spoke to [facility] who is going to verify w/family that they can bring her [address], [place] and [employer]. If she can, pt can d/c to [address] [place] [address]. She will need a rapid the day of d/c.

## 2021-01-29 NOTE — PROGRESS NOTES
Occupational Therapy       Therapy attempted to see pt for OT follow up session. Per RN, pt will be leaving floor for procedure this morning. Therapy will re-attempt as schedule allows.     Thank you    Liudmila Damian, OTR/L

## 2021-01-29 NOTE — OP NOTE
Esophagogastroduodenoscopy Note    Patient:   Santiago Mishra    YOB: 1960    Facility:   [facility] [Inpatient]   Referring/PCP: Ming Gusman    Procedure:   Esophagogastroduodenoscopy --diagnostic  Date:     1/29/2021   Endoscopist:  Bernadette Hawkins     Preoperative Diagnosis:   Anemia and wt loss  Postoperative Diagnosis:  normal    Anesthesia: Propofol  Estimated blood loss: Estimated amount of blood loss is <1ml. Complications: None    Description of Procedure:  Informed consent was obtained from the patient after explanation of the procedure including indications, description of the procedure,  benefits and possible risks and complications of the procedure, and alternatives. Questions were answered. The patient's history was reviewed and a directed physical examination was performed prior to the procedure. Patient was monitored throughout the procedure with pulse oximetry and periodic assessment of vital signs. Patient was sedated as noted above. The Nursing staff and I performed a time out. With the patient in the left lateral decubitus position, the Olympus videoendoscope was placed in the patient's mouth and under direct visualization passed into the esophagus. The scope was ultimately passed to the third portion of the duodenum. Visualization was performed during both introduction and withdrawal of the endoscope and retroflexed view of the proximal stomach was obtained. Findings[de-identified]   Esophagus: normal (full of phlegm, suctioned). The findings do not support a diagnosis of Gabriel's Esophagus. Stomach: normal  Duodenum: normal, biopsied to R/O Celiac. Recommendations: -Await pathology. , -Needs colonoscopy.     Bernadette Hawkins MD       O) [phone]          Bernadette Hawkins MD

## 2021-01-29 NOTE — PROGRESS NOTES
Hospitalist Progress Note      PCP: Kemi Goss    Date of Admission: 1/26/2021    Chief Complaint: Weakness, progressive. Frequent falls    Hospital Course: Admitted with frequent falls. Tentatively diagnosed with UTI. Urine culture shows growth of E. coli. Current antibiotics are appropriate. Of note, hemoglobin is continuously dropping. 8.1 yesterday and today from about 10 on the day of admission. Patient does not see any blood in stool. States she has been losing weight, no explanation. EGD is being planned for today. Subjective: Better and improved dizziness with ambulation. No chest pain, no shortness of breath, no nausea or vomiting. No abdominal pain.       Medications:  Reviewed    Infusion Medications    sodium chloride Stopped (01/29/21 1022)    dextrose       Scheduled Medications    paliperidone  6 mg Oral QAM    lactobacillus  1 capsule Oral Daily with breakfast    aspirin  81 mg Oral Daily    cetirizine  10 mg Oral Daily    fluticasone  2 spray Each Nostril Daily    gabapentin  600 mg Oral TID    insulin glargine  26 Units Subcutaneous QAM    letrozole  2.5 mg Oral Daily    lisinopril  10 mg Oral Daily    pantoprazole  40 mg Oral QAM AC    atorvastatin  20 mg Oral Daily    ticagrelor  90 mg Oral BID    sodium chloride flush  10 mL Intravenous 2 times per day    enoxaparin  40 mg Subcutaneous Daily    insulin lispro  0-12 Units Subcutaneous TID WC    insulin lispro  0-6 Units Subcutaneous Nightly    sulfamethoxazole-trimethoprim  1 tablet Oral 2 times per day     PRN Meds: melatonin, oxyCODONE-acetaminophen, sodium chloride flush, promethazine **OR** ondansetron, magnesium hydroxide, acetaminophen **OR** acetaminophen, glucose, dextrose, glucagon (rDNA), dextrose      Intake/Output Summary (Last 24 hours) at 1/29/2021 1132  Last data filed at 1/29/2021 1047  Gross per 24 hour   Intake 950 ml   Output --   Net 950 ml       Physical Exam Performed:    /66 Pulse 64   Temp 99.2 °F (37.3 °C) (Oral)   Resp 14   Ht 5' 3\" (1.6 m)   Wt 115 lb 14.4 oz (52.6 kg)   SpO2 99%   BMI 20.53 kg/m²     General appearance: No apparent distress, appears stated age and cooperative. HEENT: Pupils equal, round, and reactive to light. Conjunctivae/corneas clear. Neck: Supple, with full range of motion. No jugular venous distention. Trachea midline. Respiratory:  Normal respiratory effort. Clear to auscultation, bilaterally without Rales/Wheezes/Rhonchi. Cardiovascular: Regular rate and rhythm with normal S1/S2 without murmurs, rubs or gallops. Abdomen: Soft, non-tender, non-distended with normal bowel sounds. Musculoskeletal: No clubbing, cyanosis or edema bilaterally. Full range of motion without deformity. Skin: Skin color, texture, turgor normal.  No rashes or lesions. Neurologic:  Neurovascularly intact without any focal sensory/motor deficits. Cranial nerves: II-XII intact, grossly non-focal.  Psychiatric: Alert and oriented, thought content appropriate, normal insight  Capillary Refill: Brisk,< 3 seconds   Peripheral Pulses: +2 palpable, equal bilaterally     I examined the patient today (01/29/21). Physical exam is similar to yesterday (1/28). Labs:   Recent Labs     01/27/21  0706 01/28/21  0619 01/29/21  0649   WBC 6.3 5.6 4.9   HGB 9.2* 8.1* 8.1*   HCT 29.6* 25.3* 26.6*    116* 126*     Recent Labs     01/27/21  0706 01/28/21  0619 01/29/21  0649    136 137   K 3.5 3.7 4.2    107 108   CO2 23 23 21   BUN 12 11 15   CREATININE 0.9 1.0 1.1   CALCIUM 8.8 8.0* 8.0*     Recent Labs     01/26/21  1621 01/28/21  0619 01/29/21  0649   AST 34 21 23   ALT 84* 48* 44*   BILITOT <0.2 <0.2 0.4   ALKPHOS 321* 221* 218*     No results for input(s): INR in the last 72 hours.   Recent Labs     01/26/21  1621   TROPONINI <0.01       Urinalysis:      Lab Results   Component Value Date    NITRU POSITIVE 01/26/2021    WBCUA 6-9 01/26/2021    BACTERIA 2+ 11/11/2020    RBCUA 3-4 01/26/2021    BLOODU TRACE-LYSED 01/26/2021    SPECGRAV 1.015 01/26/2021    GLUCOSEU >=1000 01/26/2021    GLUCOSEU >=1000 mg/dL 06/07/2010       Radiology:  CT CERVICAL SPINE WO CONTRAST   Final Result   No acute abnormality of the cervical spine. Focal spondylosis at C5-6. CT HEAD WO CONTRAST   Final Result   No acute intracranial abnormality. Opacification of the right maxillary sinus, suggesting sinusitis. XR FOOT RIGHT (2 VIEWS)   Final Result   Chronic findings are noted. No acute radiographic abnormality is appreciated. XR CHEST (2 VW)   Final Result   No acute cardiopulmonary disease. Assessment/Plan:    Active Hospital Problems    Diagnosis    Frequent falls [R29.6]     PLAN    Progressive weakness  Probably secondary to anemia and a UTI. Noted accompanied by loss of weight and anemia. UTI may not be enough to explain  GI was consulted. EGD planned for today. Progressive anemia  More than 2 g over past 2 days  Iron studies are consistent with chronic illness  No recent endoscopy report in the chart  GI has evaluated. EGD today. Diabetes mellitus type 2 with hyperglycemia  Hemoglobin A1c is 13.8%, indicating no compliance with diet. Chronic pancreatitis may be playing a role. Continue Lantus and sliding scale insulin  Carb controlled diet started  Blood sugars are within controlled limits with current carb controlled diet or intake.     Abnormal urinalysis, presumed UTI  E. coli sensitivity is back. Continue ceftriaxone as inpatient    Hypertension  Controlled blood pressure. Continue same    Coronary artery disease  Pain-free. Continue antiplatelets and statin    Breast cancer  In remission. Early stage.   Continue letrozole    Dyslipidemia  Continue statin    Discussed with the patient        DVT Prophylaxis: Lovenox  Diet: DIET CLEAR LIQUID;  Code Status: Full Code    PT/OT Eval Status: Requested    Dispo -depends on

## 2021-01-30 LAB
GLUCOSE BLD-MCNC: 252 MG/DL (ref 70–99)
GLUCOSE BLD-MCNC: 277 MG/DL (ref 70–99)
GLUCOSE BLD-MCNC: 34 MG/DL (ref 70–99)
GLUCOSE BLD-MCNC: 42 MG/DL (ref 70–99)
GLUCOSE BLD-MCNC: 42 MG/DL (ref 70–99)
GLUCOSE BLD-MCNC: 98 MG/DL (ref 70–99)
GLUCOSE BLD-MCNC: 99 MG/DL (ref 70–99)
HCT VFR BLD CALC: 24.2 % (ref 36–48)
HEMOGLOBIN: 7.7 G/DL (ref 12–16)
MCH RBC QN AUTO: 28.7 PG (ref 26–34)
MCHC RBC AUTO-ENTMCNC: 31.7 G/DL (ref 31–36)
MCV RBC AUTO: 90.6 FL (ref 80–100)
OCCULT BLOOD SCREENING: NORMAL
PDW BLD-RTO: 15.1 % (ref 12.4–15.4)
PERFORMED ON: ABNORMAL
PERFORMED ON: NORMAL
PERFORMED ON: NORMAL
PLATELET # BLD: 151 K/UL (ref 135–450)
PMV BLD AUTO: 9.8 FL (ref 5–10.5)
RBC # BLD: 2.67 M/UL (ref 4–5.2)
WBC # BLD: 7.2 K/UL (ref 4–11)

## 2021-01-30 PROCEDURE — 2580000003 HC RX 258: Performed by: NURSE PRACTITIONER

## 2021-01-30 PROCEDURE — 6370000000 HC RX 637 (ALT 250 FOR IP): Performed by: NURSE PRACTITIONER

## 2021-01-30 PROCEDURE — 6370000000 HC RX 637 (ALT 250 FOR IP): Performed by: INTERNAL MEDICINE

## 2021-01-30 PROCEDURE — 36415 COLL VENOUS BLD VENIPUNCTURE: CPT

## 2021-01-30 PROCEDURE — G0328 FECAL BLOOD SCRN IMMUNOASSAY: HCPCS

## 2021-01-30 PROCEDURE — 2580000003 HC RX 258: Performed by: INTERNAL MEDICINE

## 2021-01-30 PROCEDURE — 1200000000 HC SEMI PRIVATE

## 2021-01-30 PROCEDURE — 85027 COMPLETE CBC AUTOMATED: CPT

## 2021-01-30 RX ORDER — DEXTROSE AND SODIUM CHLORIDE 5; .45 G/100ML; G/100ML
INJECTION, SOLUTION INTRAVENOUS CONTINUOUS
Status: DISCONTINUED | OUTPATIENT
Start: 2021-01-30 | End: 2021-02-04

## 2021-01-30 RX ORDER — POLYETHYLENE GLYCOL 3350 17 G/17G
17 POWDER, FOR SOLUTION ORAL DAILY
Status: COMPLETED | OUTPATIENT
Start: 2021-01-30 | End: 2021-01-31

## 2021-01-30 RX ORDER — INSULIN GLARGINE 100 [IU]/ML
10 INJECTION, SOLUTION SUBCUTANEOUS EVERY MORNING
Status: DISCONTINUED | OUTPATIENT
Start: 2021-01-31 | End: 2021-02-02

## 2021-01-30 RX ORDER — INSULIN GLARGINE 100 [IU]/ML
15 INJECTION, SOLUTION SUBCUTANEOUS EVERY MORNING
Status: DISCONTINUED | OUTPATIENT
Start: 2021-01-31 | End: 2021-01-30

## 2021-01-30 RX ORDER — INSULIN GLARGINE 100 [IU]/ML
20 INJECTION, SOLUTION SUBCUTANEOUS EVERY MORNING
Status: DISCONTINUED | OUTPATIENT
Start: 2021-01-31 | End: 2021-01-30

## 2021-01-30 RX ADMIN — INSULIN LISPRO 6 UNITS: 100 INJECTION, SOLUTION INTRAVENOUS; SUBCUTANEOUS at 18:49

## 2021-01-30 RX ADMIN — PANTOPRAZOLE SODIUM 40 MG: 40 TABLET, DELAYED RELEASE ORAL at 13:18

## 2021-01-30 RX ADMIN — BISACODYL 10 MG: 5 TABLET, COATED ORAL at 14:19

## 2021-01-30 RX ADMIN — FLUTICASONE PROPIONATE 2 SPRAY: 50 SPRAY, METERED NASAL at 13:20

## 2021-01-30 RX ADMIN — OXYCODONE AND ACETAMINOPHEN 1 TABLET: 5; 325 TABLET ORAL at 20:57

## 2021-01-30 RX ADMIN — SULFAMETHOXAZOLE AND TRIMETHOPRIM 1 TABLET: 800; 160 TABLET ORAL at 20:57

## 2021-01-30 RX ADMIN — Medication 1 CAPSULE: at 13:18

## 2021-01-30 RX ADMIN — DEXTROSE AND SODIUM CHLORIDE: 5; 450 INJECTION, SOLUTION INTRAVENOUS at 13:13

## 2021-01-30 RX ADMIN — INSULIN LISPRO 3 UNITS: 100 INJECTION, SOLUTION INTRAVENOUS; SUBCUTANEOUS at 20:57

## 2021-01-30 RX ADMIN — GABAPENTIN 600 MG: 300 CAPSULE ORAL at 14:19

## 2021-01-30 RX ADMIN — DEXTROSE MONOHYDRATE 12.5 G: 25 INJECTION, SOLUTION INTRAVENOUS at 11:21

## 2021-01-30 RX ADMIN — LISINOPRIL 10 MG: 10 TABLET ORAL at 13:18

## 2021-01-30 RX ADMIN — SODIUM CHLORIDE, PRESERVATIVE FREE 10 ML: 5 INJECTION INTRAVENOUS at 20:57

## 2021-01-30 RX ADMIN — POLYETHYLENE GLYCOL 3350 17 G: 17 POWDER, FOR SOLUTION ORAL at 21:00

## 2021-01-30 RX ADMIN — GABAPENTIN 600 MG: 300 CAPSULE ORAL at 20:57

## 2021-01-30 RX ADMIN — LETROZOLE 2.5 MG: 2.5 TABLET ORAL at 13:25

## 2021-01-30 RX ADMIN — CETIRIZINE HYDROCHLORIDE 10 MG: 10 TABLET, FILM COATED ORAL at 13:19

## 2021-01-30 RX ADMIN — PALIPERIDONE 6 MG: 3 TABLET, EXTENDED RELEASE ORAL at 13:25

## 2021-01-30 RX ADMIN — DEXTROSE MONOHYDRATE 12.5 G: 25 INJECTION, SOLUTION INTRAVENOUS at 07:49

## 2021-01-30 RX ADMIN — ATORVASTATIN CALCIUM 20 MG: 10 TABLET, FILM COATED ORAL at 13:19

## 2021-01-30 ASSESSMENT — PAIN SCALES - GENERAL
PAINLEVEL_OUTOF10: 5
PAINLEVEL_OUTOF10: 0
PAINLEVEL_OUTOF10: 6

## 2021-01-30 ASSESSMENT — PAIN DESCRIPTION - ORIENTATION: ORIENTATION: RIGHT

## 2021-01-30 ASSESSMENT — PAIN DESCRIPTION - LOCATION: LOCATION: FOOT

## 2021-01-30 ASSESSMENT — PAIN DESCRIPTION - DESCRIPTORS: DESCRIPTORS: PRESSURE

## 2021-01-30 ASSESSMENT — PAIN DESCRIPTION - PAIN TYPE: TYPE: ACUTE PAIN

## 2021-01-30 NOTE — PROGRESS NOTES
Paged Tayler Aguayo MD, to inform him that the patient has only had 1 BM at this time. He ordered a tap water enema for the patient. Will update as needed. Called GI back to inform that the patient was still not clear, another tap water enema ordered.

## 2021-01-30 NOTE — PROGRESS NOTES
Progress Note  Date:2021       Room:31/0531-01  Patient Name:Agustina Thompson     YOB: 1960     Age:60 y.o. Subjective    Subjective:  Symptoms:  Worsening. Pain:  She reports no pain. Review of Systems  Objective         Vitals Last 24 Hours:  TEMPERATURE:  Temp  Av.3 °F (36.8 °C)  Min: 97.4 °F (36.3 °C)  Max: 99.2 °F (37.3 °C)  RESPIRATIONS RANGE: Resp  Avg: 15.7  Min: 14  Max: 16  PULSE OXIMETRY RANGE: SpO2  Av.4 %  Min: 98 %  Max: 100 %  PULSE RANGE: Pulse  Av.9  Min: 64  Max: 86  BLOOD PRESSURE RANGE: Systolic (00TXC), DMC:712 , Min:96 , ANA:421   ; Diastolic (11RFX), EPN:90, Min:46, Max:71    I/O (24Hr): Intake/Output Summary (Last 24 hours) at 2021 0920  Last data filed at 2021 0355  Gross per 24 hour   Intake 950 ml   Output 601 ml   Net 349 ml     Objective:  General Appearance: In no acute distress and not in pain. Vital signs: (most recent): Blood pressure 135/61, pulse 78, temperature 97.4 °F (36.3 °C), temperature source Axillary, resp. rate 16, height 5' 3\" (1.6 m), weight 115 lb 14.4 oz (52.6 kg), SpO2 100 %, not currently breastfeeding. Heart: Normal rate. Regular rhythm. S1 normal and S2 normal.    Abdomen: Abdomen is soft. Bowel sounds are normal.   There is no abdominal tenderness. Labs/Imaging/Diagnostics    Labs:  CBC:  Recent Labs     21  0619 21  0649 21  0653   WBC 5.6 4.9 7.2   RBC 2.84* 2.91* 2.67*   HGB 8.1* 8.1* 7.7*   HCT 25.3* 26.6* 24.2*   MCV 89.3 91.4 90.6   RDW 15.9* 15.8* 15.1   * 126* 151     CHEMISTRIES:  Recent Labs     21  0619 21  0649    137   K 3.7 4.2    108   CO2 23 21   BUN 11 15   CREATININE 1.0 1.1   GLUCOSE 154* 246*     PT/INR:No results for input(s): PROTIME, INR in the last 72 hours. APTT:No results for input(s): APTT in the last 72 hours.   LIVER PROFILE:  Recent Labs     21  0619 21  0649   AST 21 23   ALT 48* 44*   BILITOT <0.2 0.4   ALKPHOS 221* 218*       Imaging Last 24 Hours:  No results found. Assessment//Plan           Hospital Problems           Last Modified POA    Frequent falls 1/26/2021 Yes        Assessment & Plan  62 yo w DM, HTN, CAD on ASA/Brilinta and Wilms' tumor s/p nephrectomy admitted w weakness and UTI, found to have anemia and reports wt loss and some diarrhea. Denies hematochezia or melena. C diff is neg. H/H 8/25, Fe 15%. EGD is neg.     Plan:   1. Colonoscopy scheduled this morning will be cancelled due to BS 42 and prep not adequate  2. Will re-schedule for tomorrow if stable today.   3. Will follow     Emery Parra MD       ([phone]) [phone]    Electronically signed by Emery Parra MD on 1/30/21 at 9:20 AM EST

## 2021-01-30 NOTE — PROGRESS NOTES
Hospitalist Progress Note      PCP: Roly Colon    Date of Admission: 1/26/2021    Chief Complaint:   Weakness, progressive. Frequent falls    Hospital Course:     76-year-old female with PMH of HTN, HLD, CAD status post PCI, DM 2, breast CA, anxiety, and depression. She presented to [facility] with frequent falls and fatigue. She was having increasing difficulty walking at home, and having increased falls. Gave history that she fell about 10-15 times at home. Tentatively diagnosed with UTI. Urine culture shows growth of E. coli. Current antibiotics are appropriate. Of note, hemoglobin is continuously dropping. 7.7 today  from about 10 on the day of admission. Unclear etiology of this anemia, stool was Hemoccult negative. Also there was some report of weight loss. She has been admitted for further evaluation, she is seen GI in consultation. EGD was done on 1/29/2021 and this was unremarkable. Further work-up with colonoscopy was to be done  Today/1/30/21. However it was canceled due to hypoglycemia and poor prep. Possibly to be scheduled for tomorrow if she remains stable      Subjective: She is sitting up in bed, she is complaining she feels quite hungry and is being prepped for colonoscopy. Denies chest pain or shortness of breath.       Medications:  Reviewed    Infusion Medications    dextrose 5 % and 0.45 % NaCl      dextrose       Scheduled Medications    [START ON 1/31/2021] insulin glargine  10 Units Subcutaneous QAM    paliperidone  6 mg Oral QAM    lactobacillus  1 capsule Oral Daily with breakfast    aspirin  81 mg Oral Daily    cetirizine  10 mg Oral Daily    fluticasone  2 spray Each Nostril Daily    gabapentin  600 mg Oral TID    letrozole  2.5 mg Oral Daily    lisinopril  10 mg Oral Daily    pantoprazole  40 mg Oral QAM AC    atorvastatin  20 mg Oral Daily    [Held by provider] ticagrelor  90 mg Oral BID    sodium chloride flush  10 mL Intravenous 2 times per day   [facility] by provider] enoxaparin  40 mg Subcutaneous Daily    insulin lispro  0-12 Units Subcutaneous TID     insulin lispro  0-6 Units Subcutaneous Nightly    sulfamethoxazole-trimethoprim  1 tablet Oral 2 times per day     PRN Meds: melatonin, oxyCODONE-acetaminophen, sodium chloride flush, promethazine **OR** ondansetron, magnesium hydroxide, acetaminophen **OR** acetaminophen, glucose, dextrose, glucagon (rDNA), dextrose      Intake/Output Summary (Last 24 hours) at 1/30/2021 1308  Last data filed at 1/30/2021 0355  Gross per 24 hour   Intake --   Output 1 ml   Net -1 ml       Physical Exam Performed:    /61   Pulse 78   Temp 97.4 °F (36.3 °C) (Axillary)   Resp 16   Ht 5' 3\" (1.6 m)   Wt 115 lb 14.4 oz (52.6 kg)   SpO2 100%   BMI 20.53 kg/m²     General appearance: She is sitting up in bed, complaining that she is hungry and having clear liquid diet. In no acute distress, is alert  and cooperative. HEENT: Pupils equal, round, and reactive to light. Conjunctivae/corneas clear. Neck: Supple, with full range of motion. No jugular venous distention. Trachea midline. Respiratory:  Normal respiratory effort. Clear to auscultation, bilaterally without Rales/Wheezes/Rhonchi. Cardiovascular: Regular rate and rhythm with normal S1/S2 . Abdomen: Soft, non-tender, non-distended with normal bowel sounds. Musculoskeletal: No clubbing, cyanosis or edema bilaterally. Full range of motion without deformity. Neurologic:  Neurovascularly intact without any focal sensory/motor deficits.  Cranial nerves: II-XII intact, grossly non-focal.  Psychiatric: Alert and oriented, thought content appropriate, normal insight  Capillary Refill: Brisk,< 3 seconds   Peripheral Pulses: +2 palpable, equal bilaterally       Labs:   Recent Labs     01/28/21  0619 01/29/21  0649 01/30/21  0653   WBC 5.6 4.9 7.2   HGB 8.1* 8.1* 7.7*   HCT 25.3* 26.6* 24.2*   * 126* 151     Recent Labs     01/28/21  2329 01/29/21  0649    137   K 3.7 4.2    108   CO2 23 21   BUN 11 15   CREATININE 1.0 1.1   CALCIUM 8.0* 8.0*     Recent Labs     01/28/21  0619 01/29/21  0649   AST 21 23   ALT 48* 44*   BILITOT <0.2 0.4   ALKPHOS 221* 218*     No results for input(s): INR in the last 72 hours. No results for input(s): Karla Gaster in the last 72 hours. Urinalysis:      Lab Results   Component Value Date    NITRU POSITIVE 01/26/2021    WBCUA 6-9 01/26/2021    BACTERIA 2+ 11/11/2020    RBCUA 3-4 01/26/2021    BLOODU TRACE-LYSED 01/26/2021    SPECGRAV 1.015 01/26/2021    GLUCOSEU >=1000 01/26/2021    GLUCOSEU >=1000 mg/dL 06/07/2010       Radiology:  CT CERVICAL SPINE WO CONTRAST   Final Result   No acute abnormality of the cervical spine. Focal spondylosis at C5-6. CT HEAD WO CONTRAST   Final Result   No acute intracranial abnormality. Opacification of the right maxillary sinus, suggesting sinusitis. XR FOOT RIGHT (2 VIEWS)   Final Result   Chronic findings are noted. No acute radiographic abnormality is appreciated. XR CHEST (2 VW)   Final Result   No acute cardiopulmonary disease. Assessment/Plan:    Active Hospital Problems    Diagnosis    Frequent falls [R29.6]     Progressive weakness  Probably secondary to anemia and a UTI. Noted accompanied by loss of weight and anemia. UTI may not be enough to explain  GI was consulted. EGD has been done this admission, results as noted it was essentially unremarkable. For further work-up with colonoscopy most likely tomorrow. She has been seen by PT/OT and they do recommend the time of discharge she go to SNF.     Progressive anemia  More than 2 g over past 2 days  Iron studies are consistent with chronic illness  No recent endoscopy report in the chart  GI has evaluated. EGD done this admission and results as noted. For colonoscopy possibly tomorrow.   Will follow H/H, transfuse if needed no indication for transfusion at this time.     Diabetes mellitus type 2 with hyperglycemia  Hemoglobin A1c is 13.8%, indicating no compliance with diet. Chronic pancreatitis may be playing a role. Is on Lantus and sliding scale insulin  Carb controlled diet started  She was hypoglycemic earlier today. Decrease Lantus dose for now until she has procedures done and will continue to follow sugars.     Abnormal urinalysis, presumed UTI  E. coli sensitivity is back. Continue ceftriaxone as inpatient     Hypertension  Controlled blood pressure. Continue same     Coronary artery disease  Pain-free. Continue antiplatelets and statin     Breast cancer  In remission. Early stage.   Continue letrozole     Dyslipidemia  Continue statin    DVT Prophylaxis: Lovenox  Diet: DIET CLEAR LIQUID;  Code Status: Full Code    PT/OT Eval Status: Consulted and recommend SNF at discharge    Dispo -1 to 2 days    Tashia Mcmahon MD

## 2021-01-31 LAB
ANION GAP SERPL CALCULATED.3IONS-SCNC: 8 MMOL/L (ref 3–16)
BUN BLDV-MCNC: 9 MG/DL (ref 7–20)
CALCIUM SERPL-MCNC: 7.9 MG/DL (ref 8.3–10.6)
CHLORIDE BLD-SCNC: 108 MMOL/L (ref 99–110)
CO2: 23 MMOL/L (ref 21–32)
CREAT SERPL-MCNC: 0.9 MG/DL (ref 0.6–1.2)
GFR AFRICAN AMERICAN: >60
GFR NON-AFRICAN AMERICAN: >60
GLUCOSE BLD-MCNC: 260 MG/DL (ref 70–99)
GLUCOSE BLD-MCNC: 263 MG/DL (ref 70–99)
GLUCOSE BLD-MCNC: 268 MG/DL (ref 70–99)
GLUCOSE BLD-MCNC: 281 MG/DL (ref 70–99)
GLUCOSE BLD-MCNC: 321 MG/DL (ref 70–99)
GLUCOSE BLD-MCNC: 333 MG/DL (ref 70–99)
HCT VFR BLD CALC: 22.9 % (ref 36–48)
HEMOGLOBIN: 7.1 G/DL (ref 12–16)
MAGNESIUM: 1.9 MG/DL (ref 1.8–2.4)
MCH RBC QN AUTO: 28.6 PG (ref 26–34)
MCHC RBC AUTO-ENTMCNC: 30.9 G/DL (ref 31–36)
MCV RBC AUTO: 92.4 FL (ref 80–100)
PDW BLD-RTO: 15.6 % (ref 12.4–15.4)
PERFORMED ON: ABNORMAL
PLATELET # BLD: 201 K/UL (ref 135–450)
PMV BLD AUTO: 10.3 FL (ref 5–10.5)
POTASSIUM SERPL-SCNC: 4.1 MMOL/L (ref 3.5–5.1)
RBC # BLD: 2.48 M/UL (ref 4–5.2)
SODIUM BLD-SCNC: 139 MMOL/L (ref 136–145)
WBC # BLD: 6.1 K/UL (ref 4–11)

## 2021-01-31 PROCEDURE — 6370000000 HC RX 637 (ALT 250 FOR IP): Performed by: NURSE PRACTITIONER

## 2021-01-31 PROCEDURE — 85027 COMPLETE CBC AUTOMATED: CPT

## 2021-01-31 PROCEDURE — 6370000000 HC RX 637 (ALT 250 FOR IP): Performed by: INTERNAL MEDICINE

## 2021-01-31 PROCEDURE — 02HV33Z INSERTION OF INFUSION DEVICE INTO SUPERIOR VENA CAVA, PERCUTANEOUS APPROACH: ICD-10-PCS | Performed by: INTERNAL MEDICINE

## 2021-01-31 PROCEDURE — 80048 BASIC METABOLIC PNL TOTAL CA: CPT

## 2021-01-31 PROCEDURE — 1200000000 HC SEMI PRIVATE

## 2021-01-31 PROCEDURE — 83735 ASSAY OF MAGNESIUM: CPT

## 2021-01-31 PROCEDURE — 36415 COLL VENOUS BLD VENIPUNCTURE: CPT

## 2021-01-31 PROCEDURE — 2580000003 HC RX 258: Performed by: INTERNAL MEDICINE

## 2021-01-31 RX ORDER — SODIUM CHLORIDE 0.9 % (FLUSH) 0.9 %
10 SYRINGE (ML) INJECTION EVERY 12 HOURS SCHEDULED
Status: DISCONTINUED | OUTPATIENT
Start: 2021-01-31 | End: 2021-02-05 | Stop reason: HOSPADM

## 2021-01-31 RX ORDER — LIDOCAINE HYDROCHLORIDE 10 MG/ML
5 INJECTION, SOLUTION INFILTRATION; PERINEURAL ONCE
Status: DISCONTINUED | OUTPATIENT
Start: 2021-01-31 | End: 2021-02-05 | Stop reason: HOSPADM

## 2021-01-31 RX ORDER — POLYETHYLENE GLYCOL 3350 17 G/17G
17 POWDER, FOR SOLUTION ORAL ONCE
Status: COMPLETED | OUTPATIENT
Start: 2021-01-31 | End: 2021-01-31

## 2021-01-31 RX ORDER — SODIUM CHLORIDE 0.9 % (FLUSH) 0.9 %
10 SYRINGE (ML) INJECTION PRN
Status: DISCONTINUED | OUTPATIENT
Start: 2021-01-31 | End: 2021-02-05 | Stop reason: HOSPADM

## 2021-01-31 RX ADMIN — OXYCODONE AND ACETAMINOPHEN 1 TABLET: 5; 325 TABLET ORAL at 18:42

## 2021-01-31 RX ADMIN — POLYETHYLENE GLYCOL 3350 17 G: 17 POWDER, FOR SOLUTION ORAL at 14:15

## 2021-01-31 RX ADMIN — INSULIN LISPRO 8 UNITS: 100 INJECTION, SOLUTION INTRAVENOUS; SUBCUTANEOUS at 16:59

## 2021-01-31 RX ADMIN — PANTOPRAZOLE SODIUM 40 MG: 40 TABLET, DELAYED RELEASE ORAL at 11:37

## 2021-01-31 RX ADMIN — INSULIN LISPRO 3 UNITS: 100 INJECTION, SOLUTION INTRAVENOUS; SUBCUTANEOUS at 20:35

## 2021-01-31 RX ADMIN — GABAPENTIN 600 MG: 300 CAPSULE ORAL at 14:15

## 2021-01-31 RX ADMIN — LISINOPRIL 10 MG: 10 TABLET ORAL at 11:37

## 2021-01-31 RX ADMIN — SULFAMETHOXAZOLE AND TRIMETHOPRIM 1 TABLET: 800; 160 TABLET ORAL at 09:54

## 2021-01-31 RX ADMIN — POLYETHYLENE GLYCOL 3350 17 G: 17 POWDER, FOR SOLUTION ORAL at 12:19

## 2021-01-31 RX ADMIN — ATORVASTATIN CALCIUM 20 MG: 10 TABLET, FILM COATED ORAL at 11:37

## 2021-01-31 RX ADMIN — LETROZOLE 2.5 MG: 2.5 TABLET ORAL at 12:20

## 2021-01-31 RX ADMIN — Medication 10 ML: at 20:35

## 2021-01-31 RX ADMIN — CETIRIZINE HYDROCHLORIDE 10 MG: 10 TABLET, FILM COATED ORAL at 11:37

## 2021-01-31 RX ADMIN — Medication 5 MG: at 23:58

## 2021-01-31 RX ADMIN — PALIPERIDONE 6 MG: 3 TABLET, EXTENDED RELEASE ORAL at 12:20

## 2021-01-31 RX ADMIN — Medication 1 CAPSULE: at 11:37

## 2021-01-31 RX ADMIN — DEXTROSE AND SODIUM CHLORIDE: 5; 450 INJECTION, SOLUTION INTRAVENOUS at 16:59

## 2021-01-31 RX ADMIN — INSULIN LISPRO 8 UNITS: 100 INJECTION, SOLUTION INTRAVENOUS; SUBCUTANEOUS at 12:22

## 2021-01-31 RX ADMIN — FLUTICASONE PROPIONATE 2 SPRAY: 50 SPRAY, METERED NASAL at 11:38

## 2021-01-31 RX ADMIN — GABAPENTIN 600 MG: 300 CAPSULE ORAL at 20:34

## 2021-01-31 ASSESSMENT — PAIN DESCRIPTION - PAIN TYPE: TYPE: ACUTE PAIN

## 2021-01-31 ASSESSMENT — PAIN DESCRIPTION - LOCATION: LOCATION: EAR

## 2021-01-31 NOTE — PROGRESS NOTES
Paged GI, miralax x2 ordered, q4 accu checks, and NPO after midnight for possible bowel prep tomorrow am.

## 2021-01-31 NOTE — PROGRESS NOTES
Hospitalist Progress Note      PCP: Jonathan Alicia    Date of Admission: 1/26/2021    Chief Complaint:   Weakness.  Frequent falls       Hospital Course:     69-year-old female with PMH of HTN, HLD, CAD status post PCI, DM 2, breast CA, anxiety, and depression. She presented to [facility] with c/o  frequent falls and fatigue. She was having increasing difficulty walking at home, and having increased falls. Gave history that she fell about 10-15 times at home. Tentatively diagnosed with UTI.  Urine culture shows growth of E. coli.  Current antibiotics are appropriate. Of note, hemoglobin is continuously dropping.  7.1 today  from about 10 on the day of admission. Unclear etiology of this anemia, stool was Hemoccult negative. Also there was some report of weight loss. She has been admitted for further evaluation, she is seen by GI in consultation. EGD was done on 1/29/2021 and this was unremarkable. Further work-up with colonoscopy was to be done  Tomorrow 2/1/2021  A PICC line was placed on 1/31/2021        Subjective:   She is lying in bed, in no acute distress is alert and cooperative. She denies chest pain or shortness of breath.       Medications:  Reviewed    Infusion Medications    dextrose 5 % and 0.45 % NaCl Stopped (01/31/21 0715)    dextrose       Scheduled Medications    lidocaine 1 % injection  5 mL Intradermal Once    sodium chloride flush  10 mL Intravenous 2 times per day    polyethylene glycol  17 g Oral Once    insulin glargine  10 Units Subcutaneous QAM    paliperidone  6 mg Oral QAM    lactobacillus  1 capsule Oral Daily with breakfast    aspirin  81 mg Oral Daily    cetirizine  10 mg Oral Daily    fluticasone  2 spray Each Nostril Daily    gabapentin  600 mg Oral TID    letrozole  2.5 mg Oral Daily    lisinopril  10 mg Oral Daily    pantoprazole  40 mg Oral QAM AC    atorvastatin  20 mg Oral Daily    [Held by provider] ticagrelor  90 mg Oral BID    sodium CALCIUM 8.0* 7.9*     Recent Labs     01/29/21  0649   AST 23   ALT 44*   BILITOT 0.4   ALKPHOS 218*     No results for input(s): INR in the last 72 hours. No results for input(s): Abhilash Ripper in the last 72 hours. Urinalysis:      Lab Results   Component Value Date    NITRU POSITIVE 01/26/2021    WBCUA 6-9 01/26/2021    BACTERIA 2+ 11/11/2020    RBCUA 3-4 01/26/2021    BLOODU TRACE-LYSED 01/26/2021    SPECGRAV 1.015 01/26/2021    GLUCOSEU >=1000 01/26/2021    GLUCOSEU >=1000 mg/dL 06/07/2010       Radiology:  CT CERVICAL SPINE WO CONTRAST   Final Result   No acute abnormality of the cervical spine. Focal spondylosis at C5-6. CT HEAD WO CONTRAST   Final Result   No acute intracranial abnormality. Opacification of the right maxillary sinus, suggesting sinusitis. XR FOOT RIGHT (2 VIEWS)   Final Result   Chronic findings are noted. No acute radiographic abnormality is appreciated. XR CHEST (2 VW)   Final Result   No acute cardiopulmonary disease. Assessment/Plan:    Active Hospital Problems    Diagnosis    Frequent falls [R29.6]     Progressive weakness  Probably secondary to anemia and a UTI. Noted accompanied by loss of weight and anemia.  UTI may not be enough to explain  GI was consulted. Yeni Sinclair has been done this admission, results as noted it was essentially unremarkable. For further work-up with colonoscopy most likely tomorrow. She has been seen by PT/OT and they do recommend the time of discharge she go to SNF.     Progressive anemia  More than 2 g over past 2 days  Iron studies are consistent with chronic illness  No recent endoscopy report in the chart  GI has evaluated.  EGD done this admission and results as noted. For colonoscopy possibly tomorrow. Will follow H/H, transfuse if needed no indication for transfusion at this time.   We will ensure she is typed and screened and has good IV access in case transfusion is indicated.     Diabetes mellitus type 2 with hyperglycemia  Hemoglobin A1c is 13.8%, indicating none compliance with diet. Is on Lantus and sliding scale insulin  Carb controlled diet started  Decrease Lantus dose for now until she has procedures done and will continue to follow sugars.     Abnormal urinalysis, presumed UTI  Discontinue antibiotics today we will follow clinically.     Hypertension  Controlled blood pressure.  Continue same     Coronary artery disease  Currently seems stable has no symptoms will continue antiplatelets and statin.   [address] is being held due to her anemia     Breast cancer  In remission.  Early stage.  Continue letrozole     Dyslipidemia  Continue statin       DVT Prophylaxis: Lovenox  Diet: DIET CLEAR LIQUID;  Diet NPO, After Midnight  Code Status: Full Code    PT/OT Eval Status: Consulted recommend SNF at discharge    Dispo -1 to 2 days    Matilda Cason MD

## 2021-01-31 NOTE — CONSULTS
[facility] NEPHROLOGY    [facility]              [phone]                      She is admitted with falls, weakness, and hyperglycemia We are following for CKD and related issues    Interval History and plan:      Feels week, glucose coming down since admission  She is at high risk of progression of CKD, due to uncontrolled  DM  Will do urine for Pr/cr ratio- may need ACEi or ARB                     Assessment :     CKD Stage II  Due to nephrectomy for Wilms' tumor  Creatinine is 0.9  Her creatinine is normal due to compensatory hypertrophy of the contralateral  kidney  UA- glucose, trace blood, +ve nitrites, on abx now     Hypertension   BP: (100-131)/(62-66)  Pulse:  [76-88]   BP goal inpatient [phone] systolic inpatient  BP is stable  Has Afib     Anemia    Hemoglobin low  Seen by GI   Work up pending  EGD-normal    Frequent falls  Debility  DM2- uncontrolled   CAP- sp PCI  Ca Breast    [address]  [facility] Nephrology would like to thank Emma Stock MD   for opportunity to serve this patient      Please call with questions at-   24 Hrs Answering service [phone] or  7 am- 5 pm via Perfect serve or cell phone  Dr.Sudhir Larissa Klinefelter          CC/reason for consult :     CKD     HPI :     Yoel Hackett is a 63 y.o. female presented to   the hospital on 1/26/2021 with fatigue, and frequent  Falls. She has difficulty walking, and has fallen multiple  Times a home. No fever, chills,sob, no chest pain, nausea,  Vomiting or diarrhea. No LOC, dizziness. Also has right foot pain. Has significantly wt loss recently  She came to the ED and being worked up for falls and   Weakness. She is known to have Wilms' tumor, and has nephrectomy as a child. We are consulted for CKD, and also possibility of placing PICC line.     ROS:     Seen with- no family    positives in bold   Constitutional:  fever, chills, weakness, weight change, fatigue  Skin:  rash, pruritus, hair loss, bruising, dry skin, petechiae  Head, Face, Neck   headaches, swelling,  cervical adenopathy  Respiratory: shortness of breath, cough, or wheezing  Cardiovascular: chest pain, palpitations, dizzy, edema  Gastrointestinal: nausea, vomiting, diarrhea, constipation,belly pain    Yellow skin, blood in stool  Musculoskeletal:  back pain, muscle weakness, gait problems,       joint pain or swelling. Genitourinary:  dysuria, poor urine flow, flank pain, blood in urine  Neurologic:  vertigo, TIA'S, syncope, seizures, focal weakness  Psychosocial:  insomnia, anxiety, or depression. Additional positive findings: weakness as above               All other remaining systems are negative or unable to obtain        PMH/PSH/SH/Family History:     Past Medical History:   Diagnosis Date    Abnormal brain MRI 7/20/2017    Partially empty sella and minimal chronic small vessel ischemic disease    Acute bilateral low back pain without sciatica 11/2/2016    SHARRON (acute kidney injury) ([address] 7/5/2017    Arthritis     back    Bipolar disorder ([address] 10/18/2008    CAD (coronary artery disease)     stent placed 6/8/20    Cancer [facility]) 2015    bilateral breast:s/p lumpectomy/radiation:under care care of breast specialist:Dr. Boone     Carotid stenosis, bilateral:<50%:per US 7/2016 7/15/2016    Carpal tunnel syndrome 10/18/2008    Cervical cancer screening 2014    Nml per pt'.     Coronary artery disease of native artery of native heart with stable angina pectoris ([address] 6/9/2020    DDD (degenerative disc disease), lumbar 7/18/2018    Depression     under care of pschiatrist:Dr. 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  Scoliosis     Spondylosis of lumbar region without myelopathy or radiculopathy 3/10/2017    Transient cerebral ischemia 07/15/2016    TIA:7/10/16    Unspecified cerebral artery occlusion with cerebral infarction     TIA       Past Surgical History:   Procedure Laterality Date    BREAST LUMPECTOMY  2015    Bilateral:breast cancer    CARDIAC CATHETERIZATION  06/08/2020    Dr. Anabela Vega), DAYANA to Diag 1    HYSTERECTOMY      Benign:no cervical cancer per pt'    KIDNEY REMOVAL      right    OTHER SURGICAL HISTORY Right     orif right ankle    TUBAL LIGATION          reports that she quit smoking about 6 years ago. Her smoking use included cigarettes and cigars. She has a 10.00 pack-year smoking history. She has never used smokeless tobacco. She reports that she does not drink alcohol or use drugs. family history includes Cancer in her father and mother.          Medication:     Current Facility-Administered Medications: lidocaine 1 % injection 5 mL, 5 mL, Intradermal, Once  sodium chloride flush 0.9 % injection 10 mL, 10 mL, Intravenous, 2 times per day  sodium chloride flush 0.9 % injection 10 mL, 10 mL, Intravenous, PRN  dextrose 5 % and 0.45 % sodium chloride infusion, , Intravenous, Continuous  insulin glargine (LANTUS) injection vial 10 Units, 10 Units, Subcutaneous, QAM  melatonin disintegrating tablet 5 mg, 5 mg, Oral, Nightly PRN  paliperidone (INVEGA) extended release tablet 6 mg, 6 mg, Oral, QAM  oxyCODONE-acetaminophen (PERCOCET) 5-325 MG per tablet 1 tablet, 1 tablet, Oral, Q6H PRN  lactobacillus (CULTURELLE) capsule 1 capsule, 1 capsule, Oral, Daily with breakfast  aspirin chewable tablet 81 mg, 81 mg, Oral, Daily  cetirizine (ZYRTEC) tablet 10 mg, 10 mg, Oral, Daily  fluticasone (FLONASE) 50 MCG/ACT nasal spray 2 spray, 2 spray, Each Nostril, Daily  gabapentin (NEURONTIN) capsule 600 mg, 600 mg, Oral, TID  letrozole [facility]) tablet 2.5 mg, 2.5 mg, Oral, Daily  lisinopril (PRINIVIL;ZESTRIL) tablet 10 mg, 10 mg, Oral, Daily  pantoprazole (PROTONIX) tablet 40 mg, 40 mg, Oral, QAM AC  atorvastatin (LIPITOR) tablet 20 mg, 20 mg, Oral, Daily  [Held by provider] ticagrelor (BRILINTA) tablet 90 mg, 90 mg, Oral, BID  sodium chloride flush 0.9 % injection 10 mL, 10 mL, Intravenous, 2 times per day  sodium chloride flush 0.9 % injection 10 mL, 10 mL, Intravenous, PRN  [Held by provider] enoxaparin (LOVENOX) injection 40 mg, 40 mg, Subcutaneous, Daily  promethazine (PHENERGAN) tablet 12.5 mg, 12.5 mg, Oral, Q6H PRN **OR** ondansetron (ZOFRAN) injection 4 mg, 4 mg, Intravenous, Q6H PRN  magnesium hydroxide (MILK OF MAGNESIA) 400 MG/5ML suspension 30 mL, 30 mL, Oral, Daily PRN  acetaminophen (TYLENOL) tablet 650 mg, 650 mg, Oral, Q6H PRN **OR** acetaminophen (TYLENOL) suppository 650 mg, 650 mg, Rectal, Q6H PRN  glucose (GLUTOSE) 40 % oral gel 15 g, 15 g, Oral, PRN  dextrose 50 % IV solution, 12.5 g, Intravenous, PRN  glucagon (rDNA) injection 1 mg, 1 mg, Intramuscular, PRN  dextrose 5 % solution, 100 mL/hr, Intravenous, PRN  insulin lispro (HUMALOG) injection vial 0-12 Units, 0-12 Units, Subcutaneous, TID WC  insulin lispro (HUMALOG) injection vial 0-6 Units, 0-6 Units, Subcutaneous, Nightly       Vitals :     Vitals:    01/31/21 1426   BP: 106/62   Pulse: 87   Resp: 16   Temp: 97.9 °F (36.6 °C)   SpO2: 99%       I & O :     No intake or output data in the 24 hours ending 01/31/21 1434     Physical Examination :     General appearance: Anxious- no, distressed- no, in good spirits- no  HEENT: Lips- normal, teeth- ok , oral mucosa- moist  Neck : Mass- no, appears symmetrical, JVD- not visible  Respiratory: Respiratory effort-  normal, wheeze- no, crackles - no  Cardiovascular:  Ausculation- No M/R/G, Edema no  Abdomen: visible mass- no, distention- no, scar- no, tenderness- no                            hepatosplenomegaly-  no  Musculoskeletal:  clubbing no,cyanosis- no , digital ischemia- no                           muscle strength- grossly normal , tone - grossly normal  Skin: rashes- no , ulcers- no, induration- no, tightening - no  Psychiatric:  Judgement and insight- normal           AAO X 3  Additional finding: -      LABS:     Recent Labs     01/29/21  0649 01/30/21  0653 01/31/21  0708   WBC 4.9 7.2 6.1   HGB 8.1* 7.7* 7.1*   HCT 26.6* 24.2* 22.9*   * 151 201     Recent Labs     01/29/21  0649 01/31/21  0708    139   K 4.2 4.1    108   CO2 21 23   BUN 15 9   CREATININE 1.1 0.9   GLUCOSE 246* 260*   MG  --  1.90

## 2021-01-31 NOTE — PROGRESS NOTES
64 yo w DM, HTN, CAD on ASA/Brilinta and Wilms' tumor s/p nephrectomy admitted w weakness and UTI, found to have anemia and reports wt loss and some diarrhea. Denies hematochezia or melena. C diff is neg. H/H 8/25, Fe 15%. EGD is neg.      Plan:   1. Colonoscopy scheduled this morning will be cancelled due to no IV access (awaiting the PICC team)  2. Will re-schedule for tomorrow if we can get an IV later today.   3. Will follow     Moses Arthur MD       [phone]

## 2021-01-31 NOTE — PROGRESS NOTES
Progress Note  Date:2021       Room:[facility]/0531-01  Patient Name:Agustina Freeman     YOB: 1960     Age:60 y.o. Subjective    Subjective:  Symptoms:  Stable. Pain:  She reports no pain. Review of Systems  Objective         Vitals Last 24 Hours:  TEMPERATURE:  Temp  Av.5 °F (36.9 °C)  Min: 97.4 °F (36.3 °C)  Max: 99.3 °F (37.4 °C)  RESPIRATIONS RANGE: Resp  Av  Min: 16  Max: 18  PULSE OXIMETRY RANGE: SpO2  Av.2 %  Min: 98 %  Max: 100 %  PULSE RANGE: Pulse  Av.4  Min: 69  Max: 90  BLOOD PRESSURE RANGE: Systolic (48UZU), CBQ:583 , Min:100 , WD   ; Diastolic (45MDV), FYK:20, Min:66, Max:82    I/O (24Hr): No intake or output data in the 24 hours ending 21 1011  Objective:  General Appearance:  Not in pain. Vital signs: (most recent): Blood pressure 100/66, pulse 76, temperature 97.4 °F (36.3 °C), temperature source Oral, resp. rate 16, height 5' 3\" (1.6 m), weight 115 lb 14.4 oz (52.6 kg), SpO2 100 %, not currently breastfeeding. Heart: Normal rate. Regular rhythm. S1 normal and S2 normal.    Abdomen: Abdomen is soft. Bowel sounds are normal.   There is no abdominal tenderness. Labs/Imaging/Diagnostics    Labs:  CBC:  Recent Labs     21  0649 21  0653 21  0708   WBC 4.9 7.2 6.1   RBC 2.91* 2.67* 2.48*   HGB 8.1* 7.7* 7.1*   HCT 26.6* 24.2* 22.9*   MCV 91.4 90.6 92.4   RDW 15.8* 15.1 15.6*   * 151 201     CHEMISTRIES:  Recent Labs     21  0649 21  0708    139   K 4.2 4.1    108   CO2 21 23   BUN 15 9   CREATININE 1.1 0.9   GLUCOSE 246* 260*   MG  --  1.90     PT/INR:No results for input(s): PROTIME, INR in the last 72 hours. APTT:No results for input(s): APTT in the last 72 hours. LIVER PROFILE:  Recent Labs     21  0649   AST 23   ALT 44*   BILITOT 0.4   ALKPHOS 218*       Imaging Last 24 Hours:  No results found.   Assessment//Plan           Hospital Problems           Last Modified POA Frequent falls 1/26/2021 Yes        Assessment & Plan  62 yo w DM, HTN, CAD on ASA/Brilinta and Wilms' tumor s/p nephrectomy admitted w weakness and UTI, found to have anemia and reports wt loss and some diarrhea. Denies hematochezia or melena. C diff is neg. H/H 8/25, Fe 15%. EGD is neg.      Plan:   1. Colonoscopy scheduled this morning will be cancelled due to no IV access (awaiting the PICC team)  2. Will re-schedule for tomorrow if we can get an IV later today.   3. Will follow     Loly Davis MD       [facility]) [phone]    Electronically signed by Loly Davis MD on 1/31/21 at 10:11 AM EST

## 2021-01-31 NOTE — PROGRESS NOTES
Upon arrival to place PICC line assessed chart for issues related to picc placement, check for consent, and did time out with RN Jennifer. Pt has history of Nephrectomy and CKD, Nephrolgoy Dr. Fortunato Armando informed ok to place. Pt. Tolerated PICC placement well, no difficulty accessing basilic vein and 3CG technology used to verify PICC tip placement. Positive P wave with no negative deflection. Printed wave form and placed In chart. Reported off to [facility] ok to use picc line.

## 2021-02-01 ENCOUNTER — APPOINTMENT (OUTPATIENT)
Dept: GENERAL RADIOLOGY | Age: 61
DRG: 951 | End: 2021-02-01
Payer: MEDICAID

## 2021-02-01 ENCOUNTER — ANESTHESIA (OUTPATIENT)
Dept: ENDOSCOPY | Age: 61
DRG: 951 | End: 2021-02-01
Payer: MEDICAID

## 2021-02-01 ENCOUNTER — APPOINTMENT (OUTPATIENT)
Dept: CT IMAGING | Age: 61
DRG: 951 | End: 2021-02-01
Payer: MEDICAID

## 2021-02-01 ENCOUNTER — ANESTHESIA EVENT (OUTPATIENT)
Dept: ENDOSCOPY | Age: 61
DRG: 951 | End: 2021-02-01
Payer: MEDICAID

## 2021-02-01 ENCOUNTER — APPOINTMENT (OUTPATIENT)
Dept: INTERVENTIONAL RADIOLOGY/VASCULAR | Age: 61
DRG: 951 | End: 2021-02-01
Payer: MEDICAID

## 2021-02-01 VITALS — SYSTOLIC BLOOD PRESSURE: 98 MMHG | OXYGEN SATURATION: 100 % | DIASTOLIC BLOOD PRESSURE: 50 MMHG

## 2021-02-01 LAB
ABO/RH: NORMAL
ANION GAP SERPL CALCULATED.3IONS-SCNC: 3 MMOL/L (ref 3–16)
ANTIBODY SCREEN: NORMAL
BILIRUBIN URINE: NEGATIVE
BLOOD BANK DISPENSE STATUS: NORMAL
BLOOD BANK DISPENSE STATUS: NORMAL
BLOOD BANK PRODUCT CODE: NORMAL
BLOOD BANK PRODUCT CODE: NORMAL
BLOOD, URINE: ABNORMAL
BPU ID: NORMAL
BPU ID: NORMAL
BUN BLDV-MCNC: 11 MG/DL (ref 7–20)
CALCIUM SERPL-MCNC: 7.8 MG/DL (ref 8.3–10.6)
CHLORIDE BLD-SCNC: 107 MMOL/L (ref 99–110)
CLARITY: CLEAR
CO2: 26 MMOL/L (ref 21–32)
COLOR: YELLOW
CREAT SERPL-MCNC: 0.9 MG/DL (ref 0.6–1.2)
CREATININE URINE: 50.8 MG/DL (ref 28–259)
DAT POLYSPECIFIC: NORMAL
DESCRIPTION BLOOD BANK: NORMAL
DESCRIPTION BLOOD BANK: NORMAL
EPITHELIAL CELLS, UA: ABNORMAL /HPF (ref 0–5)
GFR AFRICAN AMERICAN: >60
GFR NON-AFRICAN AMERICAN: >60
GLUCOSE BLD-MCNC: 247 MG/DL (ref 70–99)
GLUCOSE BLD-MCNC: 279 MG/DL (ref 70–99)
GLUCOSE BLD-MCNC: 280 MG/DL (ref 70–99)
GLUCOSE BLD-MCNC: 288 MG/DL (ref 70–99)
GLUCOSE URINE: 500 MG/DL
HCT VFR BLD CALC: 15.7 % (ref 36–48)
HCT VFR BLD CALC: 21 % (ref 36–48)
HEMOGLOBIN: 5 G/DL (ref 12–16)
IMMATURE RETIC FRACT: 0.57 (ref 0.21–0.37)
KETONES, URINE: NEGATIVE MG/DL
LEUKOCYTE ESTERASE, URINE: NEGATIVE
MCH RBC QN AUTO: 29.4 PG (ref 26–34)
MCHC RBC AUTO-ENTMCNC: 31.9 G/DL (ref 31–36)
MCV RBC AUTO: 92.2 FL (ref 80–100)
MICROSCOPIC EXAMINATION: YES
NITRITE, URINE: NEGATIVE
PDW BLD-RTO: 14.8 % (ref 12.4–15.4)
PERFORMED ON: ABNORMAL
PH UA: 6 (ref 5–8)
PLATELET # BLD: 185 K/UL (ref 135–450)
PMV BLD AUTO: 10.3 FL (ref 5–10.5)
POTASSIUM SERPL-SCNC: 4.7 MMOL/L (ref 3.5–5.1)
PROTEIN PROTEIN: 11 MG/DL
PROTEIN UA: NEGATIVE MG/DL
PROTEIN/CREAT RATIO: 0.2 MG/DL
RBC # BLD: 1.71 M/UL (ref 4–5.2)
RBC UA: ABNORMAL /HPF (ref 0–4)
RENAL EPITHELIAL, UA: ABNORMAL /HPF (ref 0–1)
RETICULOCYTE ABSOLUTE COUNT: 0.04 M/UL (ref 0.02–0.1)
RETICULOCYTE COUNT PCT: 1.83 % (ref 0.5–2.18)
SODIUM BLD-SCNC: 136 MMOL/L (ref 136–145)
SPECIFIC GRAVITY UA: 1.01 (ref 1–1.03)
URINE TYPE: ABNORMAL
UROBILINOGEN, URINE: 0.2 E.U./DL
WBC # BLD: 5.4 K/UL (ref 4–11)
WBC UA: ABNORMAL /HPF (ref 0–5)

## 2021-02-01 PROCEDURE — 02HV33Z INSERTION OF INFUSION DEVICE INTO SUPERIOR VENA CAVA, PERCUTANEOUS APPROACH: ICD-10-PCS | Performed by: INTERNAL MEDICINE

## 2021-02-01 PROCEDURE — 6360000002 HC RX W HCPCS: Performed by: NURSE ANESTHETIST, CERTIFIED REGISTERED

## 2021-02-01 PROCEDURE — P9016 RBC LEUKOCYTES REDUCED: HCPCS

## 2021-02-01 PROCEDURE — 3609027000 HC COLONOSCOPY: Performed by: INTERNAL MEDICINE

## 2021-02-01 PROCEDURE — 86923 COMPATIBILITY TEST ELECTRIC: CPT

## 2021-02-01 PROCEDURE — 80048 BASIC METABOLIC PNL TOTAL CA: CPT

## 2021-02-01 PROCEDURE — 84156 ASSAY OF PROTEIN URINE: CPT

## 2021-02-01 PROCEDURE — 37799 UNLISTED PX VASCULAR SURGERY: CPT

## 2021-02-01 PROCEDURE — 36430 TRANSFUSION BLD/BLD COMPNT: CPT

## 2021-02-01 PROCEDURE — 82784 ASSAY IGA/IGD/IGG/IGM EACH: CPT

## 2021-02-01 PROCEDURE — 3700000001 HC ADD 15 MINUTES (ANESTHESIA): Performed by: INTERNAL MEDICINE

## 2021-02-01 PROCEDURE — 86901 BLOOD TYPING SEROLOGIC RH(D): CPT

## 2021-02-01 PROCEDURE — 1200000000 HC SEMI PRIVATE

## 2021-02-01 PROCEDURE — 2580000003 HC RX 258: Performed by: INTERNAL MEDICINE

## 2021-02-01 PROCEDURE — C1751 CATH, INF, PER/CENT/MIDLINE: HCPCS

## 2021-02-01 PROCEDURE — 6360000002 HC RX W HCPCS: Performed by: INTERNAL MEDICINE

## 2021-02-01 PROCEDURE — 82607 VITAMIN B-12: CPT

## 2021-02-01 PROCEDURE — 6370000000 HC RX 637 (ALT 250 FOR IP): Performed by: NURSE PRACTITIONER

## 2021-02-01 PROCEDURE — 82232 ASSAY OF BETA-2 PROTEIN: CPT

## 2021-02-01 PROCEDURE — 84238 ASSAY NONENDOCRINE RECEPTOR: CPT

## 2021-02-01 PROCEDURE — 7100000011 HC PHASE II RECOVERY - ADDTL 15 MIN: Performed by: INTERNAL MEDICINE

## 2021-02-01 PROCEDURE — 86702 HIV-2 ANTIBODY: CPT

## 2021-02-01 PROCEDURE — 87040 BLOOD CULTURE FOR BACTERIA: CPT

## 2021-02-01 PROCEDURE — 71260 CT THORAX DX C+: CPT

## 2021-02-01 PROCEDURE — 6360000004 HC RX CONTRAST MEDICATION: Performed by: INTERNAL MEDICINE

## 2021-02-01 PROCEDURE — 81001 URINALYSIS AUTO W/SCOPE: CPT

## 2021-02-01 PROCEDURE — 85027 COMPLETE CBC AUTOMATED: CPT

## 2021-02-01 PROCEDURE — 83550 IRON BINDING TEST: CPT

## 2021-02-01 PROCEDURE — 87390 HIV-1 AG IA: CPT

## 2021-02-01 PROCEDURE — 83010 ASSAY OF HAPTOGLOBIN QUANT: CPT

## 2021-02-01 PROCEDURE — 3700000000 HC ANESTHESIA ATTENDED CARE: Performed by: INTERNAL MEDICINE

## 2021-02-01 PROCEDURE — 86880 COOMBS TEST DIRECT: CPT

## 2021-02-01 PROCEDURE — 0DJD8ZZ INSPECTION OF LOWER INTESTINAL TRACT, VIA NATURAL OR ARTIFICIAL OPENING ENDOSCOPIC: ICD-10-PCS | Performed by: INTERNAL MEDICINE

## 2021-02-01 PROCEDURE — 82570 ASSAY OF URINE CREATININE: CPT

## 2021-02-01 PROCEDURE — 86900 BLOOD TYPING SEROLOGIC ABO: CPT

## 2021-02-01 PROCEDURE — 2709999900 HC NON-CHARGEABLE SUPPLY: Performed by: INTERNAL MEDICINE

## 2021-02-01 PROCEDURE — 84443 ASSAY THYROID STIM HORMONE: CPT

## 2021-02-01 PROCEDURE — 87086 URINE CULTURE/COLONY COUNT: CPT

## 2021-02-01 PROCEDURE — 84165 PROTEIN E-PHORESIS SERUM: CPT

## 2021-02-01 PROCEDURE — 2500000003 HC RX 250 WO HCPCS: Performed by: NURSE ANESTHETIST, CERTIFIED REGISTERED

## 2021-02-01 PROCEDURE — 86038 ANTINUCLEAR ANTIBODIES: CPT

## 2021-02-01 PROCEDURE — 83615 LACTATE (LD) (LDH) ENZYME: CPT

## 2021-02-01 PROCEDURE — 2580000003 HC RX 258: Performed by: NURSE ANESTHETIST, CERTIFIED REGISTERED

## 2021-02-01 PROCEDURE — 7100000010 HC PHASE II RECOVERY - FIRST 15 MIN: Performed by: INTERNAL MEDICINE

## 2021-02-01 PROCEDURE — 87077 CULTURE AEROBIC IDENTIFY: CPT

## 2021-02-01 PROCEDURE — 82728 ASSAY OF FERRITIN: CPT

## 2021-02-01 PROCEDURE — 6370000000 HC RX 637 (ALT 250 FOR IP): Performed by: INTERNAL MEDICINE

## 2021-02-01 PROCEDURE — 71045 X-RAY EXAM CHEST 1 VIEW: CPT

## 2021-02-01 PROCEDURE — 86850 RBC ANTIBODY SCREEN: CPT

## 2021-02-01 PROCEDURE — 83540 ASSAY OF IRON: CPT

## 2021-02-01 PROCEDURE — 82746 ASSAY OF FOLIC ACID SERUM: CPT

## 2021-02-01 PROCEDURE — 05PYX3Z REMOVAL OF INFUSION DEVICE FROM UPPER VEIN, EXTERNAL APPROACH: ICD-10-PCS | Performed by: INTERNAL MEDICINE

## 2021-02-01 PROCEDURE — 84155 ASSAY OF PROTEIN SERUM: CPT

## 2021-02-01 PROCEDURE — 85045 AUTOMATED RETICULOCYTE COUNT: CPT

## 2021-02-01 PROCEDURE — 83883 ASSAY NEPHELOMETRY NOT SPEC: CPT

## 2021-02-01 PROCEDURE — 86701 HIV-1ANTIBODY: CPT

## 2021-02-01 PROCEDURE — 87186 SC STD MICRODIL/AGAR DIL: CPT

## 2021-02-01 PROCEDURE — 74174 CTA ABD&PLVS W/CONTRAST: CPT

## 2021-02-01 RX ORDER — SODIUM CHLORIDE, SODIUM LACTATE, POTASSIUM CHLORIDE, CALCIUM CHLORIDE 600; 310; 30; 20 MG/100ML; MG/100ML; MG/100ML; MG/100ML
INJECTION, SOLUTION INTRAVENOUS CONTINUOUS PRN
Status: DISCONTINUED | OUTPATIENT
Start: 2021-02-01 | End: 2021-02-01 | Stop reason: SDUPTHER

## 2021-02-01 RX ORDER — SODIUM CHLORIDE 9 MG/ML
INJECTION, SOLUTION INTRAVENOUS PRN
Status: DISCONTINUED | OUTPATIENT
Start: 2021-02-01 | End: 2021-02-05 | Stop reason: HOSPADM

## 2021-02-01 RX ORDER — LIDOCAINE HYDROCHLORIDE 10 MG/ML
INJECTION, SOLUTION INFILTRATION; PERINEURAL PRN
Status: DISCONTINUED | OUTPATIENT
Start: 2021-02-01 | End: 2021-02-01 | Stop reason: SDUPTHER

## 2021-02-01 RX ORDER — PROPOFOL 10 MG/ML
INJECTION, EMULSION INTRAVENOUS PRN
Status: DISCONTINUED | OUTPATIENT
Start: 2021-02-01 | End: 2021-02-01 | Stop reason: SDUPTHER

## 2021-02-01 RX ADMIN — IOPAMIDOL 75 ML: 755 INJECTION, SOLUTION INTRAVENOUS at 15:01

## 2021-02-01 RX ADMIN — OXYCODONE AND ACETAMINOPHEN 1 TABLET: 5; 325 TABLET ORAL at 21:46

## 2021-02-01 RX ADMIN — LIDOCAINE HYDROCHLORIDE 40 MG: 10 INJECTION, SOLUTION INFILTRATION; PERINEURAL at 12:25

## 2021-02-01 RX ADMIN — GABAPENTIN 600 MG: 300 CAPSULE ORAL at 19:59

## 2021-02-01 RX ADMIN — INSULIN LISPRO 6 UNITS: 100 INJECTION, SOLUTION INTRAVENOUS; SUBCUTANEOUS at 18:02

## 2021-02-01 RX ADMIN — Medication 10 ML: at 19:53

## 2021-02-01 RX ADMIN — SODIUM CHLORIDE, SODIUM LACTATE, POTASSIUM CHLORIDE, AND CALCIUM CHLORIDE: .6; .31; .03; .02 INJECTION, SOLUTION INTRAVENOUS at 12:18

## 2021-02-01 RX ADMIN — PROPOFOL 20 MG: 10 INJECTION, EMULSION INTRAVENOUS at 12:25

## 2021-02-01 RX ADMIN — INSULIN LISPRO 3 UNITS: 100 INJECTION, SOLUTION INTRAVENOUS; SUBCUTANEOUS at 19:59

## 2021-02-01 RX ADMIN — Medication 10 ML: at 00:00

## 2021-02-01 RX ADMIN — ACETAMINOPHEN 650 MG: 325 TABLET ORAL at 08:31

## 2021-02-01 RX ADMIN — Medication 5 MG: at 22:20

## 2021-02-01 RX ADMIN — OXYCODONE AND ACETAMINOPHEN 1 TABLET: 5; 325 TABLET ORAL at 15:23

## 2021-02-01 RX ADMIN — CEFTRIAXONE SODIUM 1000 MG: 1 INJECTION, POWDER, FOR SOLUTION INTRAMUSCULAR; INTRAVENOUS at 15:19

## 2021-02-01 ASSESSMENT — PAIN SCALES - GENERAL
PAINLEVEL_OUTOF10: 7
PAINLEVEL_OUTOF10: 0

## 2021-02-01 ASSESSMENT — ENCOUNTER SYMPTOMS: SHORTNESS OF BREATH: 1

## 2021-02-01 NOTE — ANESTHESIA PRE PROCEDURE
Department of Anesthesiology  Preprocedure Note       Name:  Scott Persaud   Age:  63 y.o.  :  1960                                          MRN:  [id number]         Date:  2021      Surgeon: Christi Robertson):  Joselyn Davis MD    Procedure: Procedure(s):  COLONOSCOPY DIAGNOSTIC    Medications prior to admission:   Prior to Admission medications    Medication Sig Start Date End Date Taking? Authorizing Provider   TRUE METRIX BLOOD GLUCOSE TEST strip USE AS DIRECTED TO TEST 4 TIMES A DAY 21   Douglas Johnson MD   JARDIANCE 25 MG tablet TAKE 1 TABLET BY MOUTH EVERY DAY IN THE MORNING 12/3/20   Historical Provider, MD   insulin glargine (LANTUS SOLOSTAR) 100 UNIT/ML injection pen Inject 25 Units into the skin every morning  Patient taking differently: Inject 26 Units into the skin every morning  10/8/20   Christopher Hall MD   Liraglutide (VICTOZA) 18 MG/3ML SOPN SC injection Inject 1.8 mg into the skin daily 20   Douglas Johnson MD   nitroGLYCERIN (NITROSTAT) 0.4 MG SL tablet up to max of 3 total doses.  If no relief after 1 dose, call 911. 20   Maximilian Dillard MD   Insulin Pen Needle (UNIFINE PENTIPS) 32G X 4 MM MISC USE AS DIRECTED 3 TIMES A DAY 20   Douglas Johnson MD   ticagrelor (BRILINTA) 90 MG TABS tablet Take 1 tablet by mouth 2 times daily 20   SARTHAK Boyd - CNP   simvastatin (ZOCOR) 20 MG tablet Take 20 mg by mouth nightly    Historical Provider, MD   cetirizine (ZYRTEC) 10 MG tablet Take 10 mg by mouth daily    Historical Provider, MD   lisinopril (PRINIVIL;ZESTRIL) 10 MG tablet Take 10 mg by mouth daily    Historical Provider, MD   Empagliflozin-metFORMIN HCl 12.5-1000 MG TABS Take 2 tablets by mouth daily 20   Douglas Johnson MD   Blood Pressure Monitor GINA Check BP at home once or twice a day 20   Douglas Johnson MD   Insulin Syringe-Needle U-100 31G X 5/16\" 0.3 ML MISC USE 3 TIMES A DAY BEFORE MEALS 20   Dominic Jenkins paliperidone (INVEGA) extended release tablet 6 mg  6 mg Oral QAM Edwardo Hackett MD   6 mg at 01/31/21 1220    oxyCODONE-acetaminophen (PERCOCET) 5-325 MG per tablet 1 tablet  1 tablet Oral Q6H PRN Edwardo Hackett MD   1 tablet at 01/31/21 1842    lactobacillus (CULTURELLE) capsule 1 capsule  1 capsule Oral Daily with breakfast Edwardo Hackett MD   1 capsule at 01/31/21 1137    aspirin chewable tablet 81 mg  81 mg Oral Daily SARTHAK Baires NP   81 mg at 01/28/21 9275    cetirizine (ZYRTEC) tablet 10 mg  10 mg Oral Daily SARTHAK Baires NP   10 mg at 01/31/21 1137    fluticasone (FLONASE) 50 MCG/ACT nasal spray 2 spray  2 spray Each Nostril Daily SARTHAK Baires NP   2 spray at 01/31/21 1138    gabapentin (NEURONTIN) capsule 600 mg  600 mg Oral TID SARTHAK Baires NP   600 mg at 01/31/21 2034    letrozole [facility]) tablet 2.5 mg  2.5 mg Oral Daily SARTHAK Baires NP   2.5 mg at 01/31/21 1220    lisinopril (PRINIVIL;ZESTRIL) tablet 10 mg  10 mg Oral Daily David Vickers MD   10 mg at 01/31/21 1137    pantoprazole (PROTONIX) tablet 40 mg  40 mg Oral QAM AC SARTHAK Baires NP   Stopped at 02/01/21 0700    atorvastatin (LIPITOR) tablet 20 mg  20 mg Oral Daily SARTHAK Baires NP   20 mg at 01/31/21 1137    [Held by provider] ticagrelor (BRILINTA) tablet 90 mg  90 mg Oral BID SARTHAK Baires NP   90 mg at 01/28/21 2036    sodium chloride flush 0.9 % injection 10 mL  10 mL Intravenous 2 times per day SARTHAK Baires NP   10 mL at 01/30/21 2057    sodium chloride flush 0.9 % injection 10 mL  10 mL Intravenous PRN SARTHAK Baires NP        [Held by provider] enoxaparin (LOVENOX) injection 40 mg  40 mg Subcutaneous Daily SARTHAK Baires NP   40 mg at 01/28/21 0921    promethazine (PHENERGAN) tablet 12.5 mg  12.5 mg Oral Q6H PRN SARTHAK Baires NP        Or    ondansetron (ZOFRAN) injection 4 mg  4 mg Intravenous Q6H PRN Susi GAYLE Sofia, APRN - NP        magnesium hydroxide (MILK OF MAGNESIA) 400 MG/5ML suspension 30 mL  30 mL Oral Daily PRN Maybelle Rosalba, APRN - NP        acetaminophen (TYLENOL) tablet 650 mg  650 mg Oral Q6H PRN Maybelle Rosalba, APRN - NP   650 mg at 02/01/21 0831    Or    acetaminophen (TYLENOL) suppository 650 mg  650 mg Rectal Q6H PRN Maybelle Rosalba, APRN - NP        glucose (GLUTOSE) 40 % oral gel 15 g  15 g Oral PRN Maybelle Rosalba, APRN - NP        dextrose 50 % IV solution  12.5 g Intravenous PRN Maybelle Rosalba, APRN - NP   12.5 g at 01/30/21 1121    glucagon (rDNA) injection 1 mg  1 mg Intramuscular PRN Maybelle Rosalba, APRN - NP        dextrose 5 % solution  100 mL/hr Intravenous PRN Maybelle Rosalba, APRN - NP        insulin lispro (HUMALOG) injection vial 0-12 Units  0-12 Units Subcutaneous TID WC Maybechuy Navarrete, APRN - NP   8 Units at 01/31/21 1659    insulin lispro (HUMALOG) injection vial 0-6 Units  0-6 Units Subcutaneous Nightly Lorenzo Navarrete, APRN - NP   3 Units at 01/31/21 2035       Allergies: Allergies   Allergen Reactions    Morphine Anaphylaxis and Hives     feels like throat is closing    Penicillins Hives and Swelling    Codeine Hives and Rash    Penicillin G Rash       Problem List:    Patient Active Problem List   Diagnosis Code    Essential hypertension I10    Bilateral malignant neoplasm of breast in female ([facility] C50.911, C50.912    Microalbuminuria R80.9    Obesity (BMI 30-39. 9) E66.9    Slurred speech R47.81    Hx of ischemic CVA I63.40    Brain metastases (HCC) C79.31    Carotid stenosis, bilateral:<50%:per US 7/2016 I65.23    SHARRON (acute kidney injury) (HCC) N17.9    Lactic acidosis E87.2    Frequent falls R29.6    Depression/anxiety F41.8    Abnormal brain MRI R90.89    Acute bilateral low back pain without sciatica M54.5    Uncontrolled type 2 diabetes mellitus with microalbuminuria, with long-term current use of insulin ([facility]) E11.29, E11.65, R80.9, Z79.4    Closed fracture of right ankle, with routine healing, subsequent encounter S82.891D    CKD (chronic kidney disease), stage III N18.30    Uncontrolled type 2 diabetes mellitus with diabetic nephropathy, with long-term current use of insulin ([facility]) E11.21, E11.65, Z79.4    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 ([facility] E11.59    Dyslipidemia associated with type 2 diabetes mellitus ([facility] E11.69, E78.5    Bipolar disorder ([facility] F31.9    Cardiomegaly I51.7    Cardiomyopathy ([facility] I42.9    Carpal tunnel syndrome G56.00    Chronic systolic heart failure (HCC) I50.22    Diffuse cystic mastopathy N60.19    Edema R60.9    Gallstone pancreatitis K85.10    Gout M10.9    History of breast cancer Z85.3    History of renal cell carcinoma Z85.528    Cardiomyopathy in other diseases classified elsewhere I43    Mononeuritis G58.9    Myalgia and myositis [id number]    Nonspecific abnormal electrocardiogram (ECG) (EKG) R94.31    Patient in clinical research study Z00.6    Peroneal muscular atrophy G60.0    Scoliosis (and kyphoscoliosis), idiopathic M41.20    SOBOE (shortness of breath on exertion) R06.02    Syncope and collapse R55    Chronic pain disorder G89.4    DDD (degenerative disc disease), lumbar M51.36    Diabetic polyneuropathy associated with type 2 diabetes mellitus ([facility]) E11.42    Other secondary scoliosis, lumbosacral region M41.57    Thoracic spondylosis without myelopathy M47.814    Morbid obesity due to excess calories ([facility]) E66.01    Age-related nuclear cataract of both eyes H25.13    Hypermetropia, bilateral H52.03    Hypertensive retinopathy, bilateral H35.033    Vitreous degeneration, bilateral H43.813    Acute bilateral low back pain with left-sided sciatica M54.42    History of CVA (cerebrovascular accident) without residual deficits Z86.73    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 stage 3 chronic kidney disease (HCC) I13.0, I50.22, N18.30    S/P mastectomy, right Z90.11    Acute cystitis without hematuria N30.00    Hypomagnesemia E83.42    Chest pain R07.9    CAD (coronary artery disease) I25.10    DKA (diabetic ketoacidoses) (HCC) E11.10    DKA, type 2, not at goal [facility]) E11.10    Hx of heart artery stent Z95.5    Nuclear senile cataract H25.10    Unintentional weight loss R63.4       Past Medical History:        Diagnosis Date    Abnormal brain MRI 7/20/2017    Partially empty sella and minimal chronic small vessel ischemic disease    Acute bilateral low back pain without sciatica 11/2/2016    SHARRON (acute kidney injury) ([address] 7/5/2017    Arthritis     back    Bipolar disorder ([address] 10/18/2008    CAD (coronary artery disease)     stent placed 6/8/20    Cancer [facility]) 2015    bilateral breast:s/p lumpectomy/radiation:under care care of breast specialist:Dr. Boone     Carotid stenosis, bilateral:<50%:per US 7/2016 7/15/2016    Carpal tunnel syndrome 10/18/2008    Cervical cancer screening 2014    Nml per pt'.  Coronary artery disease of native artery of native heart with stable angina pectoris ([address] 6/9/2020    DDD (degenerative disc disease), lumbar 7/18/2018    Depression     under care of pschiatrist:Dr. Raisa Villarreal    Depression/anxiety 7/5/2017    Depression/anxiety     Diabetes mellitus ([address]     Gout     History of mammogram 10/28/2016;8/14/17    Negative    Hyperlipidemia     Hypertension     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 stage 3 chronic kidney disease ([address] 9/17/2017    Microalbuminuria 7/1/2016    Neuropathy in diabetes ([address]     Non morbid obesity 7/1/2016    Pancreatitis 5/12/16    [facility] hospitalization 5/12/16-5/16/16:under care of GI:chronic pancreatitis    S/P endoscopy 6/14/2016    [place]:per pt' & her family member was nml.     Scoliosis     Spondylosis of lumbar region without myelopathy or radiculopathy 3/10/2017    Transient cerebral ischemia 07/15/2016    TIA:7/10/16    Unspecified cerebral artery occlusion with cerebral infarction     TIA       Past Surgical History:        Procedure Laterality Date    BREAST LUMPECTOMY      Bilateral:breast cancer    CARDIAC CATHETERIZATION  2020    Dr. Suzan Adan), DAYANA to Diag 1    HYSTERECTOMY      Benign:no cervical cancer per pt'    KIDNEY REMOVAL      right    OTHER SURGICAL HISTORY Right     orif right ankle    TUBAL LIGATION      UPPER GASTROINTESTINAL ENDOSCOPY N/A 2021    EGD BIOPSY performed by Emery Parra MD at [address] [place] [address] History:    Social History     Tobacco Use    Smoking status: Former Smoker     Packs/day: 0.50     Years: 20.00     Pack years: 10.00     Types: Cigarettes, Cigars     Quit date: 7/3/2014     Years since quittin.5    Smokeless tobacco: Never Used   Substance Use Topics    Alcohol use: No     Alcohol/week: 0.0 standard drinks                                Counseling given: Not Answered      Vital Signs (Current):   Vitals:    21 2357 21 0800 21 1004 21 1029   BP: 106/66 (!) 93/51 (!) 91/51 (!) 90   Pulse: 81 86 86 82   Resp: 16 16 16 16   Temp: 99.7 °F (37.6 °C) 103 °F (39.4 °C) 100.6 °F (38.1 °C) 100.6 °F (38.1 °C)   TempSrc: Oral Oral Oral Oral   SpO2: 100% 97% 96% 97%   Weight:       Height:                                                  BP Readings from Last 3 Encounters:   21 (!) 90/50   21 (!) 116/51   21 (!) 101/46       NPO Status: Time of last liquid consumption:                         Time of last solid consumption:                         Date of last liquid consumption: 21                        Date of last solid food consumption: 21    BMI:   Wt Readings from Last 3 Encounters:   21 115 lb 14.4 oz (52.6 kg)   12/10/20 128 lb 6.4 oz (58.2 kg)   20 143 lb (64.9 kg)     Body mass index is 20.53 kg/m².     CBC:   Lab Results   Component Value Date    WBC 5.4 02/01/2021    RBC 1.71 02/01/2021    HGB 5.0 02/01/2021    HCT 15.7 02/01/2021    MCV 92.2 02/01/2021    RDW 14.8 02/01/2021     02/01/2021       CMP:   Lab Results   Component Value Date     02/01/2021    K 4.7 02/01/2021    K 3.5 01/27/2021     02/01/2021    CO2 26 02/01/2021    BUN 11 02/01/2021    CREATININE 0.9 02/01/2021    GFRAA >60 02/01/2021    GFRAA >60 05/29/2013    AGRATIO 0.8 01/29/2021    LABGLOM >60 02/01/2021    GLUCOSE 247 02/01/2021    PROT 5.8 01/29/2021    PROT 8.1 01/05/2013    CALCIUM 7.8 02/01/2021    BILITOT 0.4 01/29/2021    ALKPHOS 218 01/29/2021    AST 23 01/29/2021    ALT 44 01/29/2021       POC Tests:   Recent Labs     02/01/21  0758   POCGLU 279*       Coags:   Lab Results   Component Value Date    PROTIME 11.4 07/05/2017    INR 1.01 07/05/2017    APTT 29.0 07/17/2010       HCG (If Applicable): No results found for: PREGTESTUR, PREGSERUM, HCG, HCGQUANT     ABGs:   Lab Results   Component Value Date    PHART 7.414 06/26/2015    PO2ART 66.8 06/26/2015    ZIJ6CLL 42.0 06/26/2015    NSB1LQQ 26.3 06/26/2015    BEART 1.5 06/26/2015    U3AYSMJI 93.4 06/26/2015        Type & Screen (If Applicable):  No results found for: LABABO, LABRH    Drug/Infectious Status (If Applicable):  No results found for: HIV, HEPCAB    COVID-19 Screening (If Applicable):   Lab Results   Component Value Date    COVID19 Not Detected 01/28/2021    COVID19 Not Detected 01/28/2021         Anesthesia Evaluation  Patient summary reviewed and Nursing notes reviewed no history of anesthetic complications:   Airway: Mallampati: II  TM distance: >3 FB   Neck ROM: full  Mouth opening: > = 3 FB Dental:    (+) upper dentures and edentulous      Pulmonary:   (+) shortness of breath:                             Cardiovascular:    (+) hypertension:, angina:, CAD:, CHF: systolic, CHAMBERS:,                   Neuro/Psych:   (+) CVA:, neuromuscular disease:, psychiatric history (bipolar):            GI/Hepatic/Renal:   (+) renal disease: CRI,      (-) GERD and liver disease       Endo/Other:    (+) DiabetesType II DM, , .                 Abdominal:           Vascular: negative vascular ROS. Anesthesia Plan      TIVA     ASA 3       Induction: intravenous. Anesthetic plan and risks discussed with patient. Plan discussed with CRNA.                   Kelly Gentile MD   2/1/2021

## 2021-02-01 NOTE — PROGRESS NOTES
Patient left the floor at 1140 for colonoscopy with the remainder of her blood infusing. I escorted her and transport to [facility].

## 2021-02-01 NOTE — PROGRESS NOTES
Hospitalist Progress Note      PCP: Dayanara Stinson    Date of Admission: 1/26/2021    Chief Complaint:   Weakness.  Frequent falls       Hospital Course:     48-year-old female with PMH of HTN, HLD, CAD status post PCI, DM 2, breast CA, anxiety, and depression. She presented to [facility] with c/o  frequent falls and fatigue. She was having increasing difficulty walking at home, and having increased falls. Gave history that she fell about 10-15 times at home. Tentatively diagnosed with UTI.  Urine culture shows growth of E. Coli and was started on abx. notedf to be having progressively worsening anemia  Unclear etiology of this anemia, stool was Hemoccult negative. Also there was some report of weight loss. She has been admitted for further evaluation, she is seen by GI in consultation. EGD was done on 1/29/2021 and this was unremarkable. Further work-up with colonoscopy  to be done  on 2/1/2021  A PICC line was placed on 1/31/2021        Subjective:       Pt febrile at 103F with noted hgb drop to 5 this am.  Denies any melena, hematochezia, abd pain .  Colonoscopy planned for today     Medications:  Reviewed    Infusion Medications    sodium chloride      dextrose 5 % and 0.45 % NaCl 20 mL/hr at 01/31/21 1659    dextrose       Scheduled Medications    cefTRIAXone (ROCEPHIN) IV  1,000 mg Intravenous Q24H    lidocaine 1 % injection  5 mL Intradermal Once    sodium chloride flush  10 mL Intravenous 2 times per day    insulin glargine  10 Units Subcutaneous QAM    paliperidone  6 mg Oral QAM    lactobacillus  1 capsule Oral Daily with breakfast    aspirin  81 mg Oral Daily    cetirizine  10 mg Oral Daily    fluticasone  2 spray Each Nostril Daily    gabapentin  600 mg Oral TID    letrozole  2.5 mg Oral Daily    lisinopril  10 mg Oral Daily    pantoprazole  40 mg Oral QAM AC    atorvastatin  20 mg Oral Daily    [Held by provider] ticagrelor  90 mg Oral BID    sodium chloride flush input(s): INR in the last 72 hours. No results for input(s): Wilson Davonte in the last 72 hours. Urinalysis:      Lab Results   Component Value Date    NITRU POSITIVE 01/26/2021    WBCUA 6-9 01/26/2021    BACTERIA 2+ 11/11/2020    RBCUA 3-4 01/26/2021    BLOODU TRACE-LYSED 01/26/2021    SPECGRAV 1.015 01/26/2021    GLUCOSEU >=1000 01/26/2021    GLUCOSEU >=1000 mg/dL 06/07/2010       Radiology:  XR CHEST PORTABLE   Final Result   Mild right basilar airspace disease, atelectasis versus pneumonia. CT CERVICAL SPINE WO CONTRAST   Final Result   No acute abnormality of the cervical spine. Focal spondylosis at C5-6. CT HEAD WO CONTRAST   Final Result   No acute intracranial abnormality. Opacification of the right maxillary sinus, suggesting sinusitis. XR FOOT RIGHT (2 VIEWS)   Final Result   Chronic findings are noted. No acute radiographic abnormality is appreciated. XR CHEST (2 VW)   Final Result   No acute cardiopulmonary disease. Assessment/Plan:    Active Hospital Problems    Diagnosis    Frequent falls [R29.6]     Progressive weakness  Probably secondary to anemia and a UTI. Noted accompanied by loss of weight and anemia.  UTI may not be enough to explain  GI was consulted. Racquelsarah Husseinatte has been done this admission, results as noted it was essentially unremarkable. For further work-up with colonoscopy planned today. She has been seen by PT/OT and they do recommend the time of discharge she go to SNF.     Progressive anemia: Iron studies are consistent with chronic illness. No recent endoscopy report in the chart  GI has evaluated.  EGD done this admission and results as note hgb 5 this am.No overt signs of GIB currently. 2 PRBCs ordered stat. Colonoscopy planned tentatively for today. Monitor hgb and transfuse  as needed.  Will also consult hemonc to assist with eval of progressively worsening anemia given no acute signs of GIB.      Diabetes mellitus type 2 with hyperglycemia: not controlled. Hemoglobin A1c is 13.8%, indicating none compliance with diet. Continue lantus and SSI as ordered        Abnormal urinalysis, presumed UTI: UC grew E coli. Was on abx - bactrim stopped yesterday. Febrile today. rapid COVID test was neg. Start IV ceftriaxone empirically . Septic work up - blood cultures, urine cultures, CXR to eval.            Hypertension  Borderline low. Hold parameters on lisinopril placed. Monitor  blood pressure. Coronary artery disease  Currently seems stable has no symptoms will continue antiplatelets and statin.   Jbluis Brody is being held due to her anemia     Breast cancer  In remission.    Continue letrozole     Dyslipidemia  Continue statin       DVT Prophylaxis: hold Lovenox for procedure  Diet: Diet NPO, After Midnight  Code Status: Full Code    PT/OT Eval Status: Consulted recommend SNF at discharge    Dispo - pending clinical course     Cesar Draper MD

## 2021-02-01 NOTE — ANESTHESIA POSTPROCEDURE EVALUATION
Department of Anesthesiology  Postprocedure Note    Patient: Nita Hilton  MRN: [id number]  YOB: 1960  Date of evaluation: 2/1/2021  Time:  2:36 PM     Procedure Summary     Date: 02/01/21 Room / Location: [address] [place] [address]    Anesthesia Start: 8964 Anesthesia Stop: [phone]    Procedure: COLONOSCOPY DIAGNOSTIC (N/A ) Diagnosis: (Anemia)    Surgeons: Kelly Garcia MD Responsible Provider: Bouchra Arambula MD    Anesthesia Type: TIVA ASA Status: 3          Anesthesia Type: TIVA    Marnie Phase I: Marnie Score: 10    Marnie Phase II: Marnie Score: 10    Last vitals: Reviewed and per EMR flowsheets. Anesthesia Post Evaluation    Patient location during evaluation: PACU  Patient participation: complete - patient participated  Level of consciousness: awake and alert  Airway patency: patent  Nausea & Vomiting: no nausea and no vomiting  Complications: no  Cardiovascular status: blood pressure returned to baseline  Respiratory status: acceptable  Hydration status: euvolemic  Comments: VSS on transfer to phase 2 recovery. No anesthetic complications.

## 2021-02-01 NOTE — H&P
Pre-sedation Assessment    History and Physical / Pre-Sedation Assessment  Patient:  Kathy Diaz   :   1960     Intended Procedure: colonoscopy      HPI: 64 yo w DM, HTN, CAD on ASA/Brilinta and Wilms' tumor s/p nephrectomy admitted w weakness and UTI, found to have anemia and reports profound wt loss and some diarrhea. Denies hematochezia or melena and stool is heme-neg. C diff is neg. H/H dropped to 5/16, Fe 15%. EGD/duod Bx is neg.  Patient has fever and a Lt subauricular lump (?abscess).     Current Facility-Administered Medications   Medication Dose Route Frequency Provider Last Rate Last Admin    0.9 % sodium chloride infusion   Intravenous PRN David Vickers MD        cefTRIAXone (ROCEPHIN) 1000 mg IVPB in 50 mL D5W minibag  1,000 mg Intravenous Q24H David Vickers MD        lidocaine 1 % injection 5 mL  5 mL Intradermal Once Lubna Barakat MD        sodium chloride flush 0.9 % injection 10 mL  10 mL Intravenous 2 times per day Lubna Barakat MD   10 mL at 21 2035    sodium chloride flush 0.9 % injection 10 mL  10 mL Intravenous PRN Lubna Barakat MD   10 mL at 21 0000    dextrose 5 % and 0.45 % sodium chloride infusion   Intravenous Continuous Lubna Barakat MD 20 mL/hr at 21 1659 New Bag at 21 1659    insulin glargine (LANTUS) injection vial 10 Units  10 Units Subcutaneous QAM Lubna Barakat MD        melatonin disintegrating tablet 5 mg  5 mg Oral Nightly PRN SARTHAK Talamantes - NP   5 mg at 21 2358    paliperidone (INVEGA) extended release tablet 6 mg  6 mg Oral QAM Lindsey Walton MD   6 mg at 21 1220    oxyCODONE-acetaminophen (PERCOCET) 5-325 MG per tablet 1 tablet  1 tablet Oral Q6H PRN Lindsey Walton MD   1 tablet at 21 1842    lactobacillus (CULTURELLE) capsule 1 capsule  1 capsule Oral Daily with breakfast Lindsey Walton MD   1 capsule at 21 1137    aspirin chewable tablet 81 mg  81 mg Oral Daily Patrick Shepard, APRN - NP 81 mg at 01/28/21 6469    cetirizine (ZYRTEC) tablet 10 mg  10 mg Oral Daily SARTHAK Ford - NP   10 mg at 01/31/21 1137    fluticasone (FLONASE) 50 MCG/ACT nasal spray 2 spray  2 spray Each Nostril Daily ASRTHAK Ford - NP   2 spray at 01/31/21 1138    gabapentin (NEURONTIN) capsule 600 mg  600 mg Oral TID SARTHAK Ford - NP   600 mg at 01/31/21 2034    letrozole [facility]) tablet 2.5 mg  2.5 mg Oral Daily SARTHAK Ford - NP   2.5 mg at 01/31/21 1220    lisinopril (PRINIVIL;ZESTRIL) tablet 10 mg  10 mg Oral Daily David Vickers MD   10 mg at 01/31/21 1137    pantoprazole (PROTONIX) tablet 40 mg  40 mg Oral QAM AC SARTHAK Ford - NP   Stopped at 02/01/21 0700    atorvastatin (LIPITOR) tablet 20 mg  20 mg Oral Daily Gray Torres APRN - NP   20 mg at 01/31/21 1137    [Held by provider] ticagrelor (BRILINTA) tablet 90 mg  90 mg Oral BID SARTHAK Ford - NP   90 mg at 01/28/21 2036    sodium chloride flush 0.9 % injection 10 mL  10 mL Intravenous 2 times per day SARTHAK Ford - NP   10 mL at 01/30/21 2057    sodium chloride flush 0.9 % injection 10 mL  10 mL Intravenous PRN SARTHAK Ford NP        [Held by provider] enoxaparin (LOVENOX) injection 40 mg  40 mg Subcutaneous Daily SARTHAK Ford - NP   40 mg at 01/28/21 4940    promethazine (PHENERGAN) tablet 12.5 mg  12.5 mg Oral Q6H PRN SARTHAK Ford - POONAM        Or    ondansetron (ZOFRAN) injection 4 mg  4 mg Intravenous Q6H PRN Gray Torres APRN - NP        magnesium hydroxide (MILK OF MAGNESIA) 400 MG/5ML suspension 30 mL  30 mL Oral Daily PRN Gray Torres APRN - NP        acetaminophen (TYLENOL) tablet 650 mg  650 mg Oral Q6H PRN Gray Torres, APRN - NP   650 mg at 02/01/21 0831    Or    acetaminophen (TYLENOL) suppository 650 mg  650 mg Rectal Q6H PRN Gray Torres, APRN - NP        glucose (GLUTOSE) 40 % oral gel 15 g  15 g Oral PRN Gray Torres, APRN - NP        dextrose 50 % IV solution  12.5 g Intravenous PRN Diane Arbela, APRN - NP   12.5 g at 01/30/21 1121    glucagon (rDNA) injection 1 mg  1 mg Intramuscular PRN Diane Arbela, APRN - NP        dextrose 5 % solution  100 mL/hr Intravenous PRN Diane Arbela, APRN - NP        insulin lispro (HUMALOG) injection vial 0-12 Units  0-12 Units Subcutaneous TID WC Diane Arbela, APRN - NP   8 Units at 01/31/21 1659    insulin lispro (HUMALOG) injection vial 0-6 Units  0-6 Units Subcutaneous Nightly Diane Arbela, APRN - NP   3 Units at 01/31/21 2035     Past Medical History:   Diagnosis Date    Abnormal brain MRI 7/20/2017    Partially empty sella and minimal chronic small vessel ischemic disease    Acute bilateral low back pain without sciatica 11/2/2016    SHARRON (acute kidney injury) ([facility] [address] 7/5/2017    Arthritis     back    Bipolar disorder ([facility] [address] 10/18/2008    CAD (coronary artery disease)     stent placed 6/8/20    Cancer [facility]) 2015    bilateral breast:s/p lumpectomy/radiation:under care care of breast specialist:Dr. Boone     Carotid stenosis, bilateral:<50%:per US 7/2016 7/15/2016    Carpal tunnel syndrome 10/18/2008    Cervical cancer screening 2014    Nml per pt'.     Coronary artery disease of native artery of native heart with stable angina pectoris ([facility] [address] 6/9/2020    DDD (degenerative disc disease), lumbar 7/18/2018    Depression     under care of pschiatrist:Dr. 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  Scoliosis     Spondylosis of lumbar region without myelopathy or radiculopathy 3/10/2017    Transient cerebral ischemia 07/15/2016    TIA:7/10/16    Unspecified cerebral artery occlusion with cerebral infarction     TIA     Past Surgical History:   Procedure Laterality Date    BREAST LUMPECTOMY  2015    Bilateral:breast cancer    CARDIAC CATHETERIZATION  06/08/2020    Dr. Tomasz Guillermo), DAYANA to Diag 1    HYSTERECTOMY      Benign:no cervical cancer per pt'    KIDNEY REMOVAL      right    OTHER SURGICAL HISTORY Right     orif right ankle    TUBAL LIGATION      UPPER GASTROINTESTINAL ENDOSCOPY N/A 1/29/2021    EGD BIOPSY performed by Tahira Dunbar MD at [address] [place] [address] notes reviewed and agreed. Medications reviewed  Allergies: Allergies   Allergen Reactions    Morphine Anaphylaxis and Hives     feels like throat is closing    Penicillins Hives and Swelling    Codeine Hives and Rash    Penicillin G Rash           Physical Exam:  Vital Signs: BP (!) 90/50   Pulse 82   Temp 100.6 °F (38.1 °C) (Oral)   Resp 16   Ht 5' 3\" (1.6 m)   Wt 115 lb 14.4 oz (52.6 kg)   SpO2 97%   BMI 20.53 kg/m²  Body mass index is 20.53 kg/m². Airway:Normal  Cardiac:Normal  Pulmonary:Normal  Abdomen:Normal  Specific to procedure: none      Pre-Procedure Assessment/Plan:  ASA 3 - Patient with moderate systemic disease with functional limitations  Mallampati I  Level of Sedation Plan:Deep sedation    Post Procedure plan: Return to same level of care    I assessed the patient and find that the patient is in satisfactory condition to proceed with the planned procedure and sedation plan. I have explained the risk, benefits, and alternatives to the procedure. The patient understands and agrees to proceed.   Yes    Tahira Dunbar MD       (O) [phone]          Tahira Dunbar  11:41 AM 2/1/2021

## 2021-02-01 NOTE — PROGRESS NOTES
enoxaparin  40 mg Subcutaneous Daily    insulin lispro  0-12 Units Subcutaneous TID     insulin lispro  0-6 Units Subcutaneous Nightly     PRN Meds: sodium chloride, sodium chloride flush, melatonin, oxyCODONE-acetaminophen, sodium chloride flush, promethazine **OR** ondansetron, magnesium hydroxide, acetaminophen **OR** acetaminophen, glucose, dextrose, glucagon (rDNA), dextrose      Intake/Output Summary (Last 24 hours) at 2/1/2021 0828  Last data filed at 2/1/2021 0000  Gross per 24 hour   Intake 710 ml   Output 500 ml   Net 210 ml       Physical Exam Performed:    BP (!) 93/51   Pulse 86   Temp 103 °F (39.4 °C) (Oral)   Resp 16   Ht 5' 3\" (1.6 m)   Wt 115 lb 14.4 oz (52.6 kg)   SpO2 97%   BMI 20.53 kg/m²      General appearance: She is lying in bed. In no acute distress, is alert  and cooperative. HEENT: Pupils equal, round, Conjunctivae/corneas clear. Neck: Supple, with full range of motion. No jugular venous distention. Trachea midline. Respiratory:  Normal respiratory effort. Clear to auscultation, bilaterally without Rales/Wheezes/Rhonchi. Cardiovascular: Regular rate and rhythm with normal S1/S2 . Abdomen: Soft, non-tender, non-distended with normal bowel sounds. Musculoskeletal: No clubbing, cyanosis or edema bilaterally. Neurologic:  Neurovascularly intact without any focal sensory/motor deficits. Cranial nerves: II-XII intact, grossly non-focal.  Psychiatric: Alert and oriented, thought content appropriate, normal insight        Labs:   Recent Labs     01/30/21  0653 01/31/21  0708 02/01/21  0500   WBC 7.2 6.1 5.4   HGB 7.7* 7.1* 5.0*   HCT 24.2* 22.9* 15.7*    201 185     Recent Labs     01/31/21  0708 02/01/21  0500    136   K 4.1 4.7    107   CO2 23 26   BUN 9 11   CREATININE 0.9 0.9   CALCIUM 7.9* 7.8*     No results for input(s): AST, ALT, BILIDIR, BILITOT, ALKPHOS in the last 72 hours. No results for input(s): INR in the last 72 hours.   No results for input(s): Bren Munoz in the last 72 hours. Urinalysis:      Lab Results   Component Value Date    NITRU POSITIVE 01/26/2021    WBCUA 6-9 01/26/2021    BACTERIA 2+ 11/11/2020    RBCUA 3-4 01/26/2021    BLOODU TRACE-LYSED 01/26/2021    SPECGRAV 1.015 01/26/2021    GLUCOSEU >=1000 01/26/2021    GLUCOSEU >=1000 mg/dL 06/07/2010       Radiology:  CT CERVICAL SPINE WO CONTRAST   Final Result   No acute abnormality of the cervical spine. Focal spondylosis at C5-6. CT HEAD WO CONTRAST   Final Result   No acute intracranial abnormality. Opacification of the right maxillary sinus, suggesting sinusitis. XR FOOT RIGHT (2 VIEWS)   Final Result   Chronic findings are noted. No acute radiographic abnormality is appreciated. XR CHEST (2 VW)   Final Result   No acute cardiopulmonary disease. XR CHEST PORTABLE    (Results Pending)           Assessment/Plan:    Active Hospital Problems    Diagnosis    Frequent falls [R29.6]     Progressive weakness  Probably secondary to anemia and a UTI. Noted accompanied by loss of weight and anemia.  UTI may not be enough to explain  GI was consulted. Camargo Cagey has been done this admission, results as noted it was essentially unremarkable. For further work-up with colonoscopy planned today. She has been seen by PT/OT and they do recommend the time of discharge she go to SNF.     Progressive anemia: Iron studies are consistent with chronic illness. No recent endoscopy report in the chart  GI has evaluated.  EGD done this admission and results as note hgb 5 this am.No overt signs of GIB currently. 2 PRBCs ordered stat. Colonoscopy planned tentatively for today. Monitor hgb and transfuse  as needed     Diabetes mellitus type 2 with hyperglycemia: not controlled. Hemoglobin A1c is 13.8%, indicating none compliance with diet. Continue lantus and SSI as ordered        Abnormal urinalysis, presumed UTI: UC grew E coli.  Was on abx - bactrim stopped yesterday. Febrile today. rapid COVID test was neg. Start IV ceftriaxone empirically . Septic work up - blood cultures, urine cultures, CXR to eval.            Hypertension  Borderline low. Hold parameters on lisinopril placed. Monitor  blood pressure. Coronary artery disease  Currently seems stable has no symptoms will continue antiplatelets and statin.   Aleta Case is being held due to her anemia     Breast cancer  In remission.    Continue letrozole     Dyslipidemia  Continue statin       DVT Prophylaxis: hold Lovenox for procedure  Diet: Diet NPO, After Midnight  Code Status: Full Code    PT/OT Eval Status: Consulted recommend SNF at discharge    Dispo - pending clinical course     Radha Wiseman MD

## 2021-02-01 NOTE — CARE COORDINATION
CM left message for [facility] this a.m. regarding if family can bring chemo meds to facility. Pt will need rapid on day of d/c if she goes to SNF. Pt has [employer]. Pt has low H&H this a.m. and likely will not d/c today. CM will continue to follow.   Shira Jones RN

## 2021-02-01 NOTE — PROGRESS NOTES
Occupational Therapy/Physical Therapy  OT/PT follow up attempted, pt with low H&H (5/15.7), not appropriate for therapy at this time. Will continue to follow per POC.   Siva Mijares, DEBBIE/L

## 2021-02-01 NOTE — OP NOTE
Colonoscopy Note    Patient:   Gisella Saucedo    YOB: 1960    Facility:   [facility] [Inpatient]  Referring/PCP: Cherylene Ege  Procedure:   Colonoscopy --diagnostic  Date:     2/1/2021  Endoscopist:  Jr Lew MD    Preoperative Diagnosis: Severe anemia w at loss and neg. EGD/duod Bx. Postoperative Diagnosis: Incomplete colonoscopy due to poor prep of yellowish stools    Anesthesia: Propofol  Estimated blood loss: < 5 ml  Complications:  None    Description of Procedure:  Informed consent was obtained from the patient after explanation of the procedure including indications, description of the procedure,  benefits and possible risks and complications of the procedure, and alternatives. Questions were answered. The patient's history was reviewed and a directed physical examination was performed prior to the procedure. Patient was monitored throughout the procedure with pulse oximetry and periodic assessment of vital signs. Patient was sedated as noted above. The Nursing staff and I performed a time out. With the patient initially in the left lateral decubitus position, a digital rectal examination was performed and revealed negative without mass, lesions or tenderness. The Olympus video colonoscope was placed in the patient's rectum and advanced without difficulty  to the descending colon. Photographs were taken. The prep was poor. Examination of the mucosa was performed during both introduction and withdrawal of the colonoscope. Retroflexed view of the rectum was performed. Findings:     Incomplete colonoscopy due to poor prep of yellowish stools     Recommendations: Will order CTA abd/pelvis   Needs evaluation for her Lt subauricular ? abscess and fever   Will also need repeat colonoscopy at some point    Jr Lew MD       (O) [phone]          Jr Lew MD

## 2021-02-01 NOTE — PROGRESS NOTES
Consult noted. Chart reviewed. Await C-scope results for underlying malignancy. Aware of 100 lb weight loss in 6 months. CT A/P pending. Will likely need CT chest as well. Will order. Consider MRI brain given falls. If malignancy determined, will need bone scan. Await GI work up in full. Anemia panel sent. Will see patient in the AM.   Off the floor in ENDO this afternoon. Hx of stage 2 breast CA 2014- treated by Dr. Ronald Villarreal at [facility]. Femara stopped 6/2020. Stop drug here. Was on anti-platelet therapy PTA. Stool occult blood negative. EGD negative on path this admit. Weight loss could be related to underlying malabsorption, vs malignancy, vs. Poorly controlled DM with HGB A1C of 13.5 (large amounts of glucose on UA, trace blood).

## 2021-02-01 NOTE — PROGRESS NOTES
 Neuropathy in diabetes ([facility] [address]     Non morbid obesity 7/1/2016    Pancreatitis 5/12/16    [facility] hospitalization 5/12/16-5/16/16:under care of GI:chronic pancreatitis    S/P endoscopy 6/14/2016    [facility]:per pt' & her family member was nml.  Scoliosis     Spondylosis of lumbar region without myelopathy or radiculopathy 3/10/2017    Transient cerebral ischemia 07/15/2016    TIA:7/10/16    Unspecified cerebral artery occlusion with cerebral infarction     TIA       Past Surgical History:   Procedure Laterality Date    BREAST LUMPECTOMY  2015    Bilateral:breast cancer    CARDIAC CATHETERIZATION  06/08/2020    Dr. Jeanette Carter), DAYANA to Diag 1    HYSTERECTOMY      Benign:no cervical cancer per pt'    KIDNEY REMOVAL      right    OTHER SURGICAL HISTORY Right     orif right ankle    TUBAL LIGATION      UPPER GASTROINTESTINAL ENDOSCOPY N/A 1/29/2021    EGD BIOPSY performed by Evan Dillard MD at [address] [place] [address]        reports that she quit smoking about 6 years ago. Her smoking use included cigarettes and cigars. She has a 10.00 pack-year smoking history. She has never used smokeless tobacco. She reports that she does not drink alcohol or use drugs. family history includes Cancer in her father and mother.          Medication:     Current Facility-Administered Medications: 0.9 % sodium chloride infusion, , Intravenous, PRN  cefTRIAXone (ROCEPHIN) 1000 mg IVPB in 50 mL D5W minibag, 1,000 mg, Intravenous, Q24H  lidocaine 1 % injection 5 mL, 5 mL, Intradermal, Once  sodium chloride flush 0.9 % injection 10 mL, 10 mL, Intravenous, 2 times per day  sodium chloride flush 0.9 % injection 10 mL, 10 mL, Intravenous, PRN  dextrose 5 % and 0.45 % sodium chloride infusion, , Intravenous, Continuous  insulin glargine (LANTUS) injection vial 10 Units, 10 Units, Subcutaneous, QAM  melatonin disintegrating tablet 5 mg, 5 mg, Oral, Nightly PRN  paliperidone (INVEGA) extended release tablet 6 mg, 6 mg, Oral, 0000  Gross per 24 hour   Intake 710 ml   Output 500 ml   Net 210 ml        Physical Examination :     General appearance: Anxious- no, distressed- no, in good spirits- no  HEENT: Lips- normal, teeth- ok , oral mucosa- moist  Neck : Mass- no, appears symmetrical, JVD- not visible  Respiratory: Respiratory effort-  normal, wheeze- no, crackles - no  Cardiovascular:  Ausculation- No M/R/G, Edema no  Abdomen: visible mass- no, distention- no, scar- no, tenderness- no                            hepatosplenomegaly-  no  Musculoskeletal:  clubbing no,cyanosis- no , digital ischemia- no                           muscle strength- grossly normal , tone - grossly normal  Skin: rashes- no , ulcers- no, induration- no, tightening - no  Psychiatric:  Judgement and insight- normal           AAO X 3  Additional finding: -      LABS:     Recent Labs     01/30/21  0653 01/31/21  0708 02/01/21  0500   WBC 7.2 6.1 5.4   HGB 7.7* 7.1* 5.0*   HCT 24.2* 22.9* 15.7*    201 185     Recent Labs     01/31/21  0708 02/01/21  0500    136   K 4.1 4.7    107   CO2 23 26   BUN 9 11   CREATININE 0.9 0.9   GLUCOSE 260* 247*   MG 1.90  --

## 2021-02-01 NOTE — PROGRESS NOTES
Perfect serve:  Pt admitted on 1/26 for frequent falls- found to be anemic at that time- unknown etiology.    Critical H&H this am 5.0/15.7   down from 7.1/22.9  on 1/31 please advise

## 2021-02-02 ENCOUNTER — APPOINTMENT (OUTPATIENT)
Dept: CT IMAGING | Age: 61
DRG: 951 | End: 2021-02-02
Payer: MEDICAID

## 2021-02-02 LAB
ANION GAP SERPL CALCULATED.3IONS-SCNC: 2 MMOL/L (ref 3–16)
ANTI-NUCLEAR ANTIBODY (ANA): NEGATIVE
BASOPHILS ABSOLUTE: 0 K/UL (ref 0–0.2)
BASOPHILS RELATIVE PERCENT: 0.4 %
BUN BLDV-MCNC: 13 MG/DL (ref 7–20)
CALCIUM SERPL-MCNC: 8 MG/DL (ref 8.3–10.6)
CHLORIDE BLD-SCNC: 100 MMOL/L (ref 99–110)
CO2: 29 MMOL/L (ref 21–32)
CREAT SERPL-MCNC: 1 MG/DL (ref 0.6–1.2)
EOSINOPHILS ABSOLUTE: 0 K/UL (ref 0–0.6)
EOSINOPHILS RELATIVE PERCENT: 0.6 %
FERRITIN: 781.3 NG/ML (ref 15–150)
FOLATE: 14.87 NG/ML (ref 4.78–24.2)
GFR AFRICAN AMERICAN: >60
GFR NON-AFRICAN AMERICAN: 56
GLUCOSE BLD-MCNC: 219 MG/DL (ref 70–99)
GLUCOSE BLD-MCNC: 263 MG/DL (ref 70–99)
GLUCOSE BLD-MCNC: 340 MG/DL (ref 70–99)
GLUCOSE BLD-MCNC: 462 MG/DL (ref 70–99)
GLUCOSE BLD-MCNC: 507 MG/DL (ref 70–99)
HAPTOGLOBIN: 87 MG/DL (ref 30–200)
HCT VFR BLD CALC: 24.4 % (ref 36–48)
HEMOGLOBIN: 8.1 G/DL (ref 12–16)
HIV AG/AB: NORMAL
HIV ANTIGEN: NORMAL
HIV-1 ANTIBODY: NORMAL
HIV-2 AB: NORMAL
IGA: 363 MG/DL (ref 70–400)
IGG: 1373 MG/DL (ref 700–1600)
IGM: 87 MG/DL (ref 40–230)
IRON SATURATION: 28 % (ref 15–50)
IRON: 58 UG/DL (ref 37–145)
KAPPA, FREE LIGHT CHAINS, SERUM: 44.75 MG/L (ref 3.3–19.4)
KAPPA/LAMBDA RATIO: 0.88 (ref 0.26–1.65)
KAPPA/LAMBDA TEST COMMENT: ABNORMAL
LACTATE DEHYDROGENASE: 312 U/L (ref 100–190)
LAMBDA, FREE LIGHT CHAINS, SERUM: 50.85 MG/L (ref 5.71–26.3)
LYMPHOCYTES ABSOLUTE: 1.1 K/UL (ref 1–5.1)
LYMPHOCYTES RELATIVE PERCENT: 19 %
MCH RBC QN AUTO: 29.8 PG (ref 26–34)
MCHC RBC AUTO-ENTMCNC: 33.1 G/DL (ref 31–36)
MCV RBC AUTO: 90.1 FL (ref 80–100)
MONOCYTES ABSOLUTE: 0.5 K/UL (ref 0–1.3)
MONOCYTES RELATIVE PERCENT: 9.3 %
NEUTROPHILS ABSOLUTE: 4.1 K/UL (ref 1.7–7.7)
NEUTROPHILS RELATIVE PERCENT: 70.7 %
PDW BLD-RTO: 16.1 % (ref 12.4–15.4)
PERFORMED ON: ABNORMAL
PLATELET # BLD: 157 K/UL (ref 135–450)
PMV BLD AUTO: 9.4 FL (ref 5–10.5)
POTASSIUM SERPL-SCNC: 4.7 MMOL/L (ref 3.5–5.1)
RBC # BLD: 2.71 M/UL (ref 4–5.2)
SODIUM BLD-SCNC: 131 MMOL/L (ref 136–145)
SOLUBLE TRANSFERRIN RECEPT: 2.8 MG/L (ref 1.9–4.4)
TOTAL IRON BINDING CAPACITY: 210 UG/DL (ref 260–445)
TSH REFLEX FT4: 0.79 UIU/ML (ref 0.27–4.2)
VITAMIN B-12: 512 PG/ML (ref 211–911)
WBC # BLD: 5.7 K/UL (ref 4–11)

## 2021-02-02 PROCEDURE — 97110 THERAPEUTIC EXERCISES: CPT

## 2021-02-02 PROCEDURE — 85025 COMPLETE CBC W/AUTO DIFF WBC: CPT

## 2021-02-02 PROCEDURE — 6370000000 HC RX 637 (ALT 250 FOR IP): Performed by: NURSE PRACTITIONER

## 2021-02-02 PROCEDURE — 70491 CT SOFT TISSUE NECK W/DYE: CPT

## 2021-02-02 PROCEDURE — 97116 GAIT TRAINING THERAPY: CPT

## 2021-02-02 PROCEDURE — 6360000002 HC RX W HCPCS: Performed by: INTERNAL MEDICINE

## 2021-02-02 PROCEDURE — 80048 BASIC METABOLIC PNL TOTAL CA: CPT

## 2021-02-02 PROCEDURE — 88184 FLOWCYTOMETRY/ TC 1 MARKER: CPT

## 2021-02-02 PROCEDURE — 6370000000 HC RX 637 (ALT 250 FOR IP): Performed by: INTERNAL MEDICINE

## 2021-02-02 PROCEDURE — 97530 THERAPEUTIC ACTIVITIES: CPT

## 2021-02-02 PROCEDURE — 88185 FLOWCYTOMETRY/TC ADD-ON: CPT

## 2021-02-02 PROCEDURE — 6360000004 HC RX CONTRAST MEDICATION: Performed by: INTERNAL MEDICINE

## 2021-02-02 PROCEDURE — 1200000000 HC SEMI PRIVATE

## 2021-02-02 PROCEDURE — 2580000003 HC RX 258: Performed by: INTERNAL MEDICINE

## 2021-02-02 RX ORDER — INSULIN GLARGINE 100 [IU]/ML
20 INJECTION, SOLUTION SUBCUTANEOUS EVERY MORNING
Status: DISCONTINUED | OUTPATIENT
Start: 2021-02-03 | End: 2021-02-03

## 2021-02-02 RX ADMIN — PANTOPRAZOLE SODIUM 40 MG: 40 TABLET, DELAYED RELEASE ORAL at 05:53

## 2021-02-02 RX ADMIN — IOPAMIDOL 75 ML: 755 INJECTION, SOLUTION INTRAVENOUS at 17:17

## 2021-02-02 RX ADMIN — Medication 1 CAPSULE: at 10:59

## 2021-02-02 RX ADMIN — GABAPENTIN 600 MG: 300 CAPSULE ORAL at 13:57

## 2021-02-02 RX ADMIN — CETIRIZINE HYDROCHLORIDE 10 MG: 10 TABLET, FILM COATED ORAL at 10:40

## 2021-02-02 RX ADMIN — Medication 5 MG: at 22:37

## 2021-02-02 RX ADMIN — INSULIN LISPRO 12 UNITS: 100 INJECTION, SOLUTION INTRAVENOUS; SUBCUTANEOUS at 12:55

## 2021-02-02 RX ADMIN — INSULIN LISPRO 3 UNITS: 100 INJECTION, SOLUTION INTRAVENOUS; SUBCUTANEOUS at 20:10

## 2021-02-02 RX ADMIN — OXYCODONE AND ACETAMINOPHEN 1 TABLET: 5; 325 TABLET ORAL at 20:05

## 2021-02-02 RX ADMIN — ATORVASTATIN CALCIUM 20 MG: 10 TABLET, FILM COATED ORAL at 10:41

## 2021-02-02 RX ADMIN — LISINOPRIL 10 MG: 10 TABLET ORAL at 10:41

## 2021-02-02 RX ADMIN — CEFTRIAXONE SODIUM 1000 MG: 1 INJECTION, POWDER, FOR SOLUTION INTRAMUSCULAR; INTRAVENOUS at 15:40

## 2021-02-02 RX ADMIN — INSULIN LISPRO 8 UNITS: 100 INJECTION, SOLUTION INTRAVENOUS; SUBCUTANEOUS at 10:42

## 2021-02-02 RX ADMIN — PALIPERIDONE 6 MG: 3 TABLET, EXTENDED RELEASE ORAL at 10:40

## 2021-02-02 RX ADMIN — INSULIN GLARGINE 10 UNITS: 100 INJECTION, SOLUTION SUBCUTANEOUS at 10:41

## 2021-02-02 RX ADMIN — FLUTICASONE PROPIONATE 2 SPRAY: 50 SPRAY, METERED NASAL at 10:40

## 2021-02-02 RX ADMIN — GABAPENTIN 600 MG: 300 CAPSULE ORAL at 20:05

## 2021-02-02 RX ADMIN — INSULIN LISPRO 9 UNITS: 100 INJECTION, SOLUTION INTRAVENOUS; SUBCUTANEOUS at 17:38

## 2021-02-02 RX ADMIN — Medication 10 ML: at 20:06

## 2021-02-02 RX ADMIN — GABAPENTIN 600 MG: 300 CAPSULE ORAL at 10:41

## 2021-02-02 RX ADMIN — ASPIRIN 81 MG: 81 TABLET, CHEWABLE ORAL at 10:41

## 2021-02-02 RX ADMIN — OXYCODONE AND ACETAMINOPHEN 1 TABLET: 5; 325 TABLET ORAL at 06:13

## 2021-02-02 ASSESSMENT — PAIN SCALES - GENERAL
PAINLEVEL_OUTOF10: 7
PAINLEVEL_OUTOF10: 5

## 2021-02-02 NOTE — CONSULTS
Hematology/Oncology Consultation         Patient:   Doris Wang       Reason for Consult:  Acute anemia without overt GI blood loss   Requesting Physician: No ref. provider found    Chief Complaint:     Chief Complaint   Patient presents with    Fall    Fatigue    Foot Injury     right foot                   History Obtained from:     patient, electronic medical record    History of Present Illness:     Doris Wang is a 63 y.o. female  with significant past medical history of stage 2 breast CA 2014 treated at [facility], DM, CAD s/p PCI who presents with frequent falls and fatigue. Hgb 5 and EGD negative. C-scope with poor prep. We are consulted to determine source of anemia as a primary hematologic cause. Denies night sweats and no palpable nodes. She has lost 100lbs in 6 months. Hgb A1C is 13.5. Hgb is 8.1 this AM, WBC 5.7 and plt are 157. She is agreeable to a bone marrow bx. Past Medical History:         Diagnosis Date    Abnormal brain MRI 7/20/2017    Partially empty sella and minimal chronic small vessel ischemic disease    Acute bilateral low back pain without sciatica 11/2/2016    SHARRON (acute kidney injury) ([address] 7/5/2017    Arthritis     back    Bipolar disorder ([address] 10/18/2008    CAD (coronary artery disease)     stent placed 6/8/20    Cancer [facility]) 2015    bilateral breast:s/p lumpectomy/radiation:under care care of breast specialist:Dr. Boone     Carotid stenosis, bilateral:<50%:per US 7/2016 7/15/2016    Carpal tunnel syndrome 10/18/2008    Cervical cancer screening 2014    Nml per pt'.     Coronary artery disease of native artery of native heart with stable angina pectoris ([address] 6/9/2020    DDD (degenerative disc disease), lumbar 7/18/2018    Depression     under care of pschiatrist:Dr. 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     Scoliosis     Spondylosis of lumbar region without myelopathy or radiculopathy 3/10/2017    Transient cerebral ischemia 07/15/2016    TIA:7/10/16    Unspecified cerebral artery occlusion with cerebral infarction     TIA       Past Surgical History:         Procedure Laterality Date    BREAST LUMPECTOMY  2015    Bilateral:breast cancer    CARDIAC CATHETERIZATION  06/08/2020    Dr. Hany Winn), DAYANA to Diag 1    COLONOSCOPY N/A 2/1/2021    COLONOSCOPY DIAGNOSTIC performed by Jabari Chaudhry MD at [address]      Benign:no cervical cancer per pt'    KIDNEY REMOVAL      right    OTHER SURGICAL HISTORY Right     orif right ankle    TUBAL LIGATION      UPPER GASTROINTESTINAL ENDOSCOPY N/A 1/29/2021    EGD BIOPSY performed by Jabari Chaudhry MD at [address]       Current Medications:     Current Facility-Administered Medications   Medication Dose Route Frequency Provider Last Rate Last Admin    0.9 % sodium chloride infusion   Intravenous PRN David Vickers MD        cefTRIAXone (ROCEPHIN) 1000 mg IVPB in 50 mL D5W minibag  1,000 mg Intravenous Q24H David Vickers MD   Stopped at 02/01/21 1756    lidocaine 1 % injection 5 mL  5 mL Intradermal Once Jayson Ulrich MD        sodium chloride flush 0.9 % injection 10 mL  10 mL Intravenous 2 times per day Jayson Ulrich MD   10 mL at 02/01/21 1953    sodium chloride flush 0.9 % injection 10 mL  10 mL Intravenous PRN Jayson Ulrich MD   10 mL at 02/01/21 0000    dextrose 5 % and 0.45 % sodium Subcutaneous Daily SARTHAK Lucero - NP   40 mg at 01/28/21 3649    promethazine (PHENERGAN) tablet 12.5 mg  12.5 mg Oral Q6H PRN Andressa Daniels APRN - NP        Or    ondansetron (ZOFRAN) injection 4 mg  4 mg Intravenous Q6H PRN Andressa Daniels, APRN - NP        magnesium hydroxide (MILK OF MAGNESIA) 400 MG/5ML suspension 30 mL  30 mL Oral Daily PRN Andressa Daniels, APRN - NP        acetaminophen (TYLENOL) tablet 650 mg  650 mg Oral Q6H PRN Andressa Daniels, APRN - NP   650 mg at 02/01/21 0831    Or    acetaminophen (TYLENOL) suppository 650 mg  650 mg Rectal Q6H PRN Andressa Daniels, APRN - NP        glucose (GLUTOSE) 40 % oral gel 15 g  15 g Oral PRN Andressa Daniels, APRN - NP        dextrose 50 % IV solution  12.5 g Intravenous PRN Andressa Daniels APRN - NP   12.5 g at 01/30/21 1121    glucagon (rDNA) injection 1 mg  1 mg Intramuscular PRN Andressa Daniels, APRN - NP        dextrose 5 % solution  100 mL/hr Intravenous PRN Andressa Daniels, APRN - NP        insulin lispro (HUMALOG) injection vial 0-12 Units  0-12 Units Subcutaneous TID WC Andressa Daniels APRN - NP   8 Units at 02/02/21 1042    insulin lispro (HUMALOG) injection vial 0-6 Units  0-6 Units Subcutaneous Nightly Andressa Daniels APRN - NP   3 Units at 02/1960       Allergies:      Allergies   Allergen Reactions    Morphine Anaphylaxis and Hives     feels like throat is closing    Penicillins Hives and Swelling    Codeine Hives and Rash    Penicillin G Rash       Social History:     Social History     Socioeconomic History    Marital status:      Spouse name: Not on file    Number of children: Not on file    Years of education: Not on file    Highest education level: Not on file   Occupational History    Occupation:    Social Needs    Financial resource strain: Not on file    Food insecurity     Worry: Not on file     Inability: Not on file    Transportation needs     Medical: Not on file     Non-medical: Not on file Tobacco Use    Smoking status: Former Smoker     Packs/day: 0.50     Years: 20.00     Pack years: 10.00     Types: Cigarettes, Cigars     Quit date: 7/3/2014     Years since quittin.5    Smokeless tobacco: Never Used   Substance and Sexual Activity    Alcohol use: No     Alcohol/week: 0.0 standard drinks    Drug use: No    Sexual activity: Never   Lifestyle    Physical activity     Days per week: Not on file     Minutes per session: Not on file    Stress: Not on file   Relationships    Social connections     Talks on phone: Not on file     Gets together: Not on file     Attends Orthodoxy service: Not on file     Active member of club or organization: Not on file     Attends meetings of clubs or organizations: Not on file     Relationship status: Not on file    Intimate partner violence     Fear of current or ex partner: Not on file     Emotionally abused: Not on file     Physically abused: Not on file     Forced sexual activity: Not on file   Other Topics Concern    Not on file   Social History Narrative    Not on file     Social History     Substance and Sexual Activity   Drug Use No     Social History     Substance and Sexual Activity   Alcohol Use No    Alcohol/week: 0.0 standard drinks     Social History     Substance and Sexual Activity   Sexual Activity Never     Social History     Tobacco Use   Smoking Status Former Smoker    Packs/day: 0.50    Years: 20.00    Pack years: 10.00    Types: Cigarettes, Cigars    Quit date: 7/3/2014    Years since quittin.5   Smokeless Tobacco Never Used       Family History:     Family History   Problem Relation Age of Onset    Cancer Mother         breast    Cancer Father     Heart Failure Neg Hx     High Cholesterol Neg Hx     Hypertension Neg Hx     Migraines Neg Hx     Rashes/Skin Problems Neg Hx     Seizures Neg Hx     Stroke Neg Hx     Thyroid Disease Neg Hx     Diabetes Neg Hx        Review of Systems:     Constitutional: Denies fever, sweats, weight loss. .     Eyes: No visual changes or diplopia. No scleral icterus. ENT: No Headaches, no hearing loss, no  vertigo. No mouth sores or sore throat. Cardiovascular: No chest pain, no dyspnea on exertion, no palpitations, no  loss of consciousness. Respiratory: No cough, no  wheezing, no dyspnea, no sputum production. No hemoptysis. .    Gastrointestinal: No abdominal pain, no appetite loss, no blood in stools. No change in bowel habits. Genitourinary: No dysuria, trouble voiding, or hematuria. Musculoskeletal:  Generalized weakness. No joint complaints. Integumentary: No rash or pruritis. Neurological: No headache, diplopia. No change in gait, balance, or coordination. No paresthesias. Endocrine: No temperature intolerance. No excessive thirst, fluid intake, or urination. Hematologic/Lymphatic: No abnormal bruising or ecchymoses, no blood clots or swollen lymph nodes. Allergic/Immunologic: No nasal congestion or hives. Physical Exam:     /65   Pulse 65   Temp 98.5 °F (36.9 °C) (Oral)   Resp 18   Ht 5' 3\" (1.6 m)   Wt 115 lb 14.4 oz (52.6 kg)   SpO2 94%   BMI 20.53 kg/m²     CONSTITUTIONAL: awake, alert, cooperative, no apparent distress. EYES:  Pupils equal, round and reactive to light, sclera non-icteric, conjunctiva normal  ENT:  Normocephalic, without obvious abnormality, atraumatic, sinuses nontender on palpation, external ears without lesions, oral pharynx with moist mucus membranes, no mucositis.   NECK:  Supple, symmetrical, trachea midline, no adenopathy, thyroid symmetric, not enlarged and no tenderness, skin normal  HEMATOLOGIC/LYMPHATICS:  no cervical lymphadenopathy, no supraclavicular lymphadenopathy, no axillary lymphadenopathy and no inguinal lymphadenopathy  BACK:  Symmetric, no curvature, spinous processes are non-tender on palpation, paraspinous muscles are non-tender on palpation, no costal vertebral tenderness  LUNGS:  Clear to auscultation bilaterally, no crackles or wheezing  CARDIOVASCULAR:  Regular rate and rhythm, normal S1 and S2, no S3 or S4, and no murmur noted  ABDOMEN:  Normal bowel sounds, soft, non-distended, non-tender, no masses palpated, no hepatosplenomegally  MUSCULOSKELETAL:  There is no redness, warmth, or swelling of the joints  NEUROLOGIC:   No focal findings. SKIN:  Scattered bruising and ecchymoses. EXT: without clubbing, cyanosis or edema. Data:     CBC:  Recent Labs     02/02/21  1055 02/01/21  1845 02/01/21  0500 01/31/21  0708   WBC 5.7  --  5.4 6.1   HGB 8.1*  --  5.0* 7.1*   HCT 24.4* 21.0* 15.7* 22.9*   MCV 90.1  --  92.2 92.4     --  185 201       BMP:  Recent Labs     02/02/21  1055 02/01/21  0500 01/31/21  0708 01/27/21  0706 01/27/21  0706 01/07/21  2030 01/07/21 2030   * 136 139   < > 140   < > 129*   K 4.7 4.7 4.1   < > 3.5   < > 4.5   CO2 29 26 23   < > 23   < > 20*   BUN 13 11 9   < > 12   < > 21*   CREATININE 1.0 0.9 0.9   < > 0.9   < > 1.1   MG  --   --  1.90  --  2.00  --  2.00    < > = values in this interval not displayed. HEPATIC:  Recent Labs     02/01/21  1845 01/29/21  0649 01/28/21  0619 01/26/21  1621   AST  --  23 21 34   ALT  --  44* 48* 84*   ALKPHOS  --  218* 221* 321*   PROT 5.8* 5.8* 5.9* 7.3   BILITOT  --  0.4 <0.2 <0.2   *  --   --   --        TUMOR MARKERS:  No results for input(s): PSA, CEA, , MK4335,  in the last 720 hours.       MAGNESIUM:    Lab Results   Component Value Date    MG 1.90 01/31/2021       PT/INR:    No results found for: PTINR    Lab Results   Component Value Date    INR 1.01 07/05/2017       PTT:    Lab Results   Component Value Date    APTT 29.0 07/17/2010       U/A:    Lab Results   Component Value Date    NITRITE negative 01/06/2013    COLORU Yellow 02/01/2021    PHUR 6.0 02/01/2021    WBCUA 0-2 02/01/2021    RBCUA 5-10 02/01/2021    YEAST Present 01/26/2021    BACTERIA 2+ 11/11/2020    CLARITYU Clear 02/01/2021    SPECGRAV 1.015 02/01/2021    LEUKOCYTESUR Negative 02/01/2021    UROBILINOGEN 0.2 02/01/2021    BILIRUBINUR Negative 02/01/2021    BILIRUBINUR negative 01/06/2013    BILIRUBINUR Negative 06/07/2010    BLOODU TRACE-INTACT 02/01/2021    GLUCOSEU 500 02/01/2021    GLUCOSEU >=1000 mg/dL 06/07/2010    AMORPHOUS Rare 10/07/2020       Imaging:     Right upper extremity PICC line has changed in position as compared to recent   radiograph, with tip no longer in the superior vena cava.  The tip of the   catheter now appears closely associated with the anterior wall of the left   brachiocephalic vein.  Recommend repositioning.       Small bilateral pleural effusions.  Bibasilar opacity likely reflects   atelectasis in the absence of clinical signs or symptoms of pneumonitis.       Atherosclerosis, including coronary artery calcification.       No significant interval change in 3 mm right upper lobe pulmonary nodule as   compared to prior.       Soft tissue anasarca.       Findings were called by the radiology call center. No definitive evidence of active arterial extravasation.  No hematoma.  Close   follow-up recommended.       No evidence of acute process in the abdomen/pelvis, as discussed.       Progression of the patient's chronic calcific pancreatitis is slightly   increased pancreatic dilation since July 2019. CT head     No acute intracranial abnormality.       Opacification of the right maxillary sinus, suggesting sinusitis. Impression:     Normocytic anemia   Hx of stage 2 breast CA 2014- s/p Femara   Poorly controlled DM   Unintentional Weight loss   Non smoker   CAD on dual anti-platelet therapy   Frequent falls PTA- CT head negative no contrast     Plan:     Upper EGD negative. C-scope with poor prep. Repeat outpatient. CTA A/P no source of bleed. Stool occult blood was negative as well. No signs of hemolysis or NEETA. , Hapto 87. Ferritin 781, Iron stores 58, sat is 28 percent, folate 14, B12 is 512.  TSH normal at 0.79. So far no signs of LAD or malignancy on CT C/A/P. Needs C scope and Mamoorgram as healthcare maintenance. Myeloma labs pending. Facial abscess- ENT to see. Possible I and D. Bone marrow bx ordered. Rule out MDS. Thank you very much for allowing me to participate in the care of this patient.     Brooks Dhaliwal, CNP   Medical Oncology/Hematology    [facility] - [address],  [address]  Phone: [phone]  Fax: [phone]

## 2021-02-02 NOTE — PROGRESS NOTES
Occupational Therapy  OT follow up attempted, pt reports she is on the phone with her . Will re-attempt as schedule permits and continue to follow per POC.   DEBBIE Bernal/REG

## 2021-02-02 NOTE — PROGRESS NOTES
Progress Note  Date:2021       Room:31/0531-  Patient Name:Agustina Espana     YOB: 1960     Age:60 y.o. Subjective    Subjective:  Symptoms:  Stable. Pain:  She reports no pain. Review of Systems  Objective         Vitals Last 24 Hours:  TEMPERATURE:  Temp  Av.5 °F (37.5 °C)  Min: 97.6 °F (36.4 °C)  Max: 103 °F (39.4 °C)  RESPIRATIONS RANGE: Resp  Av.9  Min: 16  Max: 18  PULSE OXIMETRY RANGE: SpO2  Av.5 %  Min: 85 %  Max: 100 %  PULSE RANGE: Pulse  Av.8  Min: 65  Max: 88  BLOOD PRESSURE RANGE: Systolic (42OFT), CLW:332 , Min:90 , FZ   ; Diastolic (42GHP), UNT:72, Min:45, Max:97    I/O (24Hr): Intake/Output Summary (Last 24 hours) at 2021 0733  Last data filed at 2021 2130  Gross per 24 hour   Intake 546.67 ml   Output --   Net 546.67 ml     Objective:  General Appearance:  Not in pain and in no acute distress. Vital signs: (most recent): Blood pressure 105/65, pulse 65, temperature 98.5 °F (36.9 °C), temperature source Oral, resp. rate 18, height 5' 3\" (1.6 m), weight 115 lb 14.4 oz (52.6 kg), SpO2 94 %, not currently breastfeeding. Heart: Normal rate. Regular rhythm. S1 normal and S2 normal.    Abdomen: Abdomen is soft. Bowel sounds are normal.   There is no abdominal tenderness. Labs/Imaging/Diagnostics    Labs:  CBC:  Recent Labs     21  0708 21  0500 21  1845   WBC 6.1 5.4  --    RBC 2.48* 1.71*  --    HGB 7.1* 5.0*  --    HCT 22.9* 15.7* 21.0*   MCV 92.4 92.2  --    RDW 15.6* 14.8  --     185  --      CHEMISTRIES:  Recent Labs     21  0708 21  0500    136   K 4.1 4.7    107   CO2 23 26   BUN 9 11   CREATININE 0.9 0.9   GLUCOSE 260* 247*   MG 1.90  --      PT/INR:No results for input(s): PROTIME, INR in the last 72 hours. APTT:No results for input(s): APTT in the last 72 hours.   LIVER PROFILE:No results for input(s): AST, ALT, BILIDIR, BILITOT, ALKPHOS in the last 72 hours.    Imaging Last 24 Hours:  Ct Chest W Contrast    Result Date: 2/1/2021  EXAMINATION: CT OF THE CHEST WITH CONTRAST 2/1/2021 2:58 pm TECHNIQUE: CT of the chest was performed with the administration of intravenous contrast. Multiplanar reformatted images are provided for review. Dose modulation, iterative reconstruction, and/or weight based adjustment of the mA/kV was utilized to reduce the radiation dose to as low as reasonably achievable. COMPARISON: 06/05/2020, chest radiograph dated 02/01/2021 at 08:21 HISTORY: ORDERING SYSTEM PROVIDED HISTORY: eval for malignancy TECHNOLOGIST PROVIDED HISTORY: Reason for exam:->eval for malignancy Reason for Exam: eval for malignancy Acuity: Acute Type of Exam: Initial FINDINGS: Mediastinum: Calcification of the thoracic aorta. No aortic intraluminal flap. Coronary artery calcification. Within the mediastinum, no magnolia adenopathy. Right upper extremity PICC line is seen extending to the left brachiocephalic vein. The tip of the catheter appears closely associated with the anterior wall of the brachiocephalic vein. There is opacification of small collateral vessels within the anterior mediastinum. No axillary adenopathy. Lungs/pleura: Small bilateral pleural effusions are seen. Emphysema is present. 3 mm right upper lobe pulmonary nodule on series 4, image 31 is similar to prior. Patchy ground-glass opacity is seen bilaterally. Small air-filled cysts or pneumatoceles are seen at the right costophrenic angle. A similar finding had been seen on prior. No pneumothorax. Upper Abdomen: Liver is fatty. Right kidney is absent. A portion of bowel is seen at the right renal fossa. Pancreatic ductal dilatation, incompletely imaged though grossly similar to prior. Soft Tissues/Bones: Anasarca of soft tissues. Heterogeneous attenuation of lower thoracic vertebral bodies is not significantly changed.   Mild heterogeneity at the cervicothoracic junction is also similar to prior.     Right upper extremity PICC line has changed in position as compared to recent radiograph, with tip no longer in the superior vena cava. The tip of the catheter now appears closely associated with the anterior wall of the left brachiocephalic vein. Recommend repositioning. Small bilateral pleural effusions. Bibasilar opacity likely reflects atelectasis in the absence of clinical signs or symptoms of pneumonitis. Atherosclerosis, including coronary artery calcification. No significant interval change in 3 mm right upper lobe pulmonary nodule as compared to prior. Soft tissue anasarca. Findings were called by the radiology call center. Xr Chest Portable    Result Date: 2/1/2021  EXAMINATION: ONE XRAY VIEW OF THE CHEST 2/1/2021 8:29 am COMPARISON: Chest radiograph January 26, 2021. HISTORY: ORDERING SYSTEM PROVIDED HISTORY: fever TECHNOLOGIST PROVIDED HISTORY: Reason for exam:->fever Reason for Exam: FEVER Acuity: Acute Type of Exam: Initial FINDINGS: There has been placement of a right upper extremity PICC with tip at the cavoatrial junction. There is mild right basilar airspace disease which is new compared to prior. Linear interface over the lateral left lung appears artifactual due to and overlying skin fold. No pneumothorax. Cardiac and mediastinal contours are without acute process. No acute osseous abnormality. Mild right basilar airspace disease, atelectasis versus pneumonia. Cta Abdomen Pelvis W Contrast    Result Date: 2/1/2021  EXAMINATION: CTA OF THE ABDOMEN AND PELVIS WITH CONTRAST 2/1/2021 2:58 pm: TECHNIQUE: CTA of the abdomen and pelvis was performed with the administration of intravenous contrast. Multiplanar reformatted images are provided for review. MIP images are provided for review. Dose modulation, iterative reconstruction, and/or weight based adjustment of the mA/kV was utilized to reduce the radiation dose to as low as reasonably achievable.  COMPARISON: CT abdomen and pelvis July 22, 2019 HISTORY: ORDERING SYSTEM PROVIDED HISTORY: severe drop in H/H TECHNOLOGIST PROVIDED HISTORY: Reason for exam:->severe drop in H/H Reason for Exam: severe drop in H/H Acuity: Acute Type of Exam: Initial FINDINGS: CTA ABDOMEN: Findings of the chest discussed on today's concurrent chest CT. No evidence of arterial extravasation throughout the abdomen. No hematoma. No hemoperitoneum. No ascites. No free air. There is hepatic steatosis. Chronic calcific pancreatitis noted, with increase progression of calcifications throughout the body of the pancreas. Dilation of the pancreatic duct again noted. Gallbladder appears within normal limits. No evidence of cholelithiasis. Postsurgical changes from right-sided nephrectomy again noted. Spleen appears within normal limits, normal size. Bowel mostly collapsed throughout the abdomen and into the pelvis, no evidence of colitis or other acute process. No evidence of obstruction either. Body wall edema noted. CTA PELVIS: No evidence of active arterial extravasation. No hematoma. No free fluid/hemoperitoneum. Bladder appears normal.  Body wall edema is noted within the pelvis and upper legs. No definitive evidence of active arterial extravasation. No hematoma. Close follow-up recommended. No evidence of acute process in the abdomen/pelvis, as discussed. Progression of the patient's chronic calcific pancreatitis is slightly increased pancreatic dilation since July 2019. Assessment//Plan           Hospital Problems           Last Modified POA    Frequent falls 1/26/2021 Yes        Assessment & Plan  64 yo w DM, HTN, CAD on ASA/Brilinta and Wilms' tumor s/p nephrectomy admitted w weakness and UTI, found to have anemia and reports profound wt loss and some diarrhea. Denies hematochezia or melena and stool is heme-neg. C diff is neg. H/H dropped to 5/16, Fe 15%. EGD/duod Bx is neg. Colonoscopy incomplete due to poor prep.  CTA abd/pelvis/chest unremarkable. Patient has fever and a Lt subauricular lump (?abscess/tumor). Plan:   1. Further management per treating team  2. Still needs colonoscopy at some point  3.  Will follow    Priyanka Phillip MD       (O) [phone]        Electronically signed by Priyanka Phillip MD on 2/2/21 at 7:33 AM EST

## 2021-02-02 NOTE — PROGRESS NOTES
Patient asked us to take her purse to her \"friend\" at the front door that they were going to pay her rent, Cholo Fraga took purse down and it was her \"son, or son in law\" she told him he could get out what he needed or put what he needed in there while she was standing there. He did not want to do that and said he would bring it back in an hour. He was told the purse can not come back in if he takes it, he was upset but still took the purse.

## 2021-02-02 NOTE — PROGRESS NOTES
[facility]    [facility]              [phone]                      She is admitted with falls, weakness, and hyperglycemia We are following for CKD and related issues    Interval History and plan:      Feels week, glucose coming down since admission  Has low hb and also mass in the left neck   Got blood transfusion yesterday  Has hyperglycemia  Hematology consulted  K/L pending  Colonoscopy- inadequate prep, CT abs with contrast unremarkable  Has calcific pancreatitis  Has b/l pleural effusion, soft tissue anasarca,   Pr/cr ratio is normal  Renal function is stable despite angiogram  No changes today                     Assessment :     CKD Stage II  Due to nephrectomy for Wilms' tumor  Creatinine is 0.9  Her creatinine is normal due to compensatory hypertrophy of the contralateral  kidney  UA- glucose, trace blood, +ve nitrites, on abx now     Hypertension   BP: (105)/(65)  Pulse:  [65]   BP goal inpatient [phone] systolic inpatient  BP is stable  Has Afib     Anemia    Hemoglobin low  Seen by GI   Work up pending  EGD-normal    Frequent falls  Debility  DM2- uncontrolled   CAP- sp PCI  Ca Breast    [facility] would like to thank Lorenzo Corrales MD   for opportunity to serve this patient      Please call with questions at-   24 Hrs Answering service [phone] or  7 am- 5 pm via Perfect serve or cell phone  Dr.Sudhir Julieth Mohr          CC/reason for consult :     CKD     HPI :     Jeimy Harper is a 63 y.o. female presented to   the hospital on 1/26/2021 with fatigue, and frequent  Falls. She has difficulty walking, and has fallen multiple  Times a home. No fever, chills,sob, no chest pain, nausea,  Vomiting or diarrhea. No LOC, dizziness. Also has right foot pain. Has significantly wt loss recently  She came to the ED and being worked up for falls and   Weakness. She is known to have Wilms' tumor, and has nephrectomy as a child.   We are consulted for CKD, and Number Of Freeze-Thaw Cycles: 1 freeze-thaw cycle also possibility of placing PICC line. ROS:     Seen with- no family    positives in bold   Constitutional:  fever, chills, weakness, weight change, fatigue  Skin:  rash, pruritus, hair loss, bruising, dry skin, petechiae  Head, Face, Neck   headaches, swelling,  cervical adenopathy  Respiratory: shortness of breath, cough, or wheezing  Cardiovascular: chest pain, palpitations, dizzy, edema  Gastrointestinal: nausea, vomiting, diarrhea, constipation,belly pain    Yellow skin, blood in stool  Musculoskeletal:  back pain, muscle weakness, gait problems,       joint pain or swelling. Genitourinary:  dysuria, poor urine flow, flank pain, blood in urine  Neurologic:  vertigo, TIA'S, syncope, seizures, focal weakness  Psychosocial:  insomnia, anxiety, or depression. Additional positive findings: weakness as above               All other remaining systems are negative or unable to obtain        PMH/PSH/SH/Family History:     Past Medical History:   Diagnosis Date    Abnormal brain MRI 7/20/2017    Partially empty sella and minimal chronic small vessel ischemic disease    Acute bilateral low back pain without sciatica 11/2/2016    SHARRON (acute kidney injury) ([address] 7/5/2017    Arthritis     back    Bipolar disorder ([address] 10/18/2008    CAD (coronary artery disease)     stent placed 6/8/20    Cancer [facility]) 2015    bilateral breast:s/p lumpectomy/radiation:under care care of breast specialist:Dr. Boone     Carotid stenosis, bilateral:<50%:per US 7/2016 7/15/2016    Carpal tunnel syndrome 10/18/2008    Cervical cancer screening 2014    Nml per pt'.     Coronary artery disease of native artery of native heart with stable angina pectoris ([address] 6/9/2020    DDD (degenerative disc disease), lumbar 7/18/2018    Depression     under care of pschiatrist:Dr. 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  Scoliosis     Spondylosis of lumbar region without myelopathy or radiculopathy 3/10/2017    Transient cerebral ischemia 07/15/2016    TIA:7/10/16    Unspecified cerebral artery occlusion with cerebral infarction     TIA       Past Surgical History:   Procedure Laterality Date    BREAST LUMPECTOMY  2015    Bilateral:breast cancer    CARDIAC CATHETERIZATION  06/08/2020    Dr. Clare Adrian), DAYANA to Diag 1    COLONOSCOPY N/A 2/1/2021    COLONOSCOPY DIAGNOSTIC performed by Denny Macias MD at [address]      Benign:no cervical cancer per pt'   [address]      right    OTHER SURGICAL HISTORY Right     orif right ankle    TUBAL LIGATION      UPPER GASTROINTESTINAL ENDOSCOPY N/A 1/29/2021    EGD BIOPSY performed by Denny Macias MD at [address]        reports that she quit smoking about 6 years ago. Her smoking use included cigarettes and cigars. She has a 10.00 pack-year smoking history. She has never used smokeless tobacco. She reports that she does not drink alcohol or use drugs. family history includes Cancer in her father and mother.          Medication:     Current Facility-Administered Medications: 0.9 % sodium chloride infusion, , Intravenous, PRN  cefTRIAXone (ROCEPHIN) 1000 mg IVPB in 50 mL D5W minibag, 1,000 mg, Intravenous, Q24H  lidocaine 1 % injection 5 mL, 5 mL, Intradermal, Once  sodium chloride flush 0.9 % injection 10 mL, 10 mL, Intravenous, 2 times per day  sodium chloride flush 0.9 % injection 10 mL, 10 Render Post-Care Instructions In Note?: no Medical Necessity Clause: This procedure was medically necessary because the lesions that were treated were: mL, Intravenous, PRN  dextrose 5 % and 0.45 % sodium chloride infusion, , Intravenous, Continuous  insulin glargine (LANTUS) injection vial 10 Units, 10 Units, Subcutaneous, QAM  melatonin disintegrating tablet 5 mg, 5 mg, Oral, Nightly PRN  paliperidone (INVEGA) extended release tablet 6 mg, 6 mg, Oral, QAM  oxyCODONE-acetaminophen (PERCOCET) 5-325 MG per tablet 1 tablet, 1 tablet, Oral, Q6H PRN  lactobacillus (CULTURELLE) capsule 1 capsule, 1 capsule, Oral, Daily with breakfast  aspirin chewable tablet 81 mg, 81 mg, Oral, Daily  cetirizine (ZYRTEC) tablet 10 mg, 10 mg, Oral, Daily  fluticasone (FLONASE) 50 MCG/ACT nasal spray 2 spray, 2 spray, Each Nostril, Daily  gabapentin (NEURONTIN) capsule 600 mg, 600 mg, Oral, TID  lisinopril (PRINIVIL;ZESTRIL) tablet 10 mg, 10 mg, Oral, Daily  pantoprazole (PROTONIX) tablet 40 mg, 40 mg, Oral, QAM AC  atorvastatin (LIPITOR) tablet 20 mg, 20 mg, Oral, Daily  [Held by provider] ticagrelor (BRILINTA) tablet 90 mg, 90 mg, Oral, BID  sodium chloride flush 0.9 % injection 10 mL, 10 mL, Intravenous, 2 times per day  sodium chloride flush 0.9 % injection 10 mL, 10 mL, Intravenous, PRN  [Held by provider] enoxaparin (LOVENOX) injection 40 mg, 40 mg, Subcutaneous, Daily  promethazine (PHENERGAN) tablet 12.5 mg, 12.5 mg, Oral, Q6H PRN **OR** ondansetron (ZOFRAN) injection 4 mg, 4 mg, Intravenous, Q6H PRN  magnesium hydroxide (MILK OF MAGNESIA) 400 MG/5ML suspension 30 mL, 30 mL, Oral, Daily PRN  acetaminophen (TYLENOL) tablet 650 mg, 650 mg, Oral, Q6H PRN **OR** acetaminophen (TYLENOL) suppository 650 mg, 650 mg, Rectal, Q6H PRN  glucose (GLUTOSE) 40 % oral gel 15 g, 15 g, Oral, PRN  dextrose 50 % IV solution, 12.5 g, Intravenous, PRN  glucagon (rDNA) injection 1 mg, 1 mg, Intramuscular, PRN  dextrose 5 % solution, 100 mL/hr, Intravenous, PRN  insulin lispro (HUMALOG) injection vial 0-12 Units, 0-12 Units, Subcutaneous, TID WC  insulin lispro (HUMALOG) injection vial 0-6 Units, 0-6 Medical Necessity Information: It is in your best interest to select a reason for this procedure from the list below. All of these items fulfill various CMS LCD requirements except the new and changing color options. Units, Subcutaneous, Nightly       Vitals :     Vitals:    02/02/21 0345   BP: 105/65   Pulse: 65   Resp: 18   Temp: 98.5 °F (36.9 °C)   SpO2: 94%       I & O :       Intake/Output Summary (Last 24 hours) at 2/2/2021 1013  Last data filed at 2/1/2021 2130  Gross per 24 hour   Intake 546.67 ml   Output --   Net 546.67 ml        Physical Examination :     General appearance: Anxious- no, distressed- no, in good spirits- no  HEENT: Lips- normal, teeth- ok , oral mucosa- moist  Neck : Mass- no, appears symmetrical, JVD- not visible  Respiratory: Respiratory effort-  normal, wheeze- no, crackles - no  Cardiovascular:  Ausculation- No M/R/G, Edema no  Abdomen: visible mass- no, distention- no, scar- no, tenderness- no                            hepatosplenomegaly-  no  Musculoskeletal:  clubbing no,cyanosis- no , digital ischemia- no                           muscle strength- grossly normal , tone - grossly normal  Skin: rashes- no , ulcers- no, induration- no, tightening - no  Psychiatric:  Judgement and insight- normal           AAO X 3  Additional finding: -      LABS:     Recent Labs     01/31/21  0708 02/01/21  0500 02/01/21  1845   WBC 6.1 5.4  --    HGB 7.1* 5.0*  --    HCT 22.9* 15.7* 21.0*    185  --      Recent Labs     01/31/21  0708 02/01/21  0500    136   K 4.1 4.7    107   CO2 23 26   BUN 9 11   CREATININE 0.9 0.9   GLUCOSE 260* 247*   MG 1.90  -- Detail Level: Detailed Consent: The patient's consent was obtained including but not limited to risks of crusting, scabbing, blistering, scarring, darker or lighter pigmentary change, recurrence, incomplete removal and infection. Post-Care Instructions: I reviewed with the patient in detail post-care instructions. Patient is to wear sunprotection, and avoid picking at any of the treated lesions. Pt may apply Vaseline to crusted or scabbing areas.

## 2021-02-02 NOTE — PROGRESS NOTES
Physical Therapy  Facility/Department: [facility] C5 - MED SURG/ORTHO  Daily Treatment Note  NAME: Karey Ornelas  : 1960  MRN: [id number]    Date of Service: 2021    Discharge Recommendations:  Subacute/Skilled Nursing Facility   PT Equipment Recommendations  Equipment Needed: No  Other: if d/c home, needs RW    Assessment   Body structures, Functions, Activity limitations: Decreased functional mobility ; Decreased balance;Decreased strength;Decreased endurance  Assessment: Pt indep to mod I with mobility at baseline. Demos improved balance to CGA for mobility with RW this date. Pt would benefit from continued skilled therapy to address deficits. Recommend SNF at d/c due to high fall risk, lives alone, and falls frequently. Treatment Diagnosis: impaired functional mobility  Prognosis: Good  Decision Making: Low Complexity  Barriers to Learning: none  REQUIRES PT FOLLOW UP: Yes  Activity Tolerance  Activity Tolerance: Patient Tolerated treatment well  Activity Tolerance: BP 96/58 (non-symptomatic)   77HR  96%     Patient Diagnosis(es): The primary encounter diagnosis was Acute cystitis with hematuria. Diagnoses of Frequent falls, Closed head injury, initial encounter, and Generalized weakness were also pertinent to this visit.      has a past medical history of Abnormal brain MRI, Acute bilateral low back pain without sciatica, SHARRON (acute kidney injury) ([address] Arthritis, Bipolar disorder ([address] CAD (coronary artery disease), Cancer ([address] Carotid stenosis, bilateral:<50%:per US 2016, Carpal tunnel syndrome, Cervical cancer screening, Coronary artery disease of native artery of native heart with stable angina pectoris ([address] DDD (degenerative disc disease), lumbar, Depression, Depression/anxiety, Depression/anxiety, Diabetes mellitus ([address] Gout, History of mammogram, Hyperlipidemia, Hypertension, 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 has a past surgical history that includes Kidney removal; Hysterectomy; Breast lumpectomy (2015); Tubal ligation; other surgical history (Right); Cardiac catheterization (06/08/2020); Upper gastrointestinal endoscopy (N/A, 1/29/2021); and Colonoscopy (N/A, 2/1/2021). Restrictions  Restrictions/Precautions  Restrictions/Precautions: Fall Risk, General Precautions  Position Activity Restriction  Other position/activity restrictions: up with assist  Subjective   General  Chart Reviewed: Yes  Response To Previous Treatment: Patient with no complaints from previous session.   Family / Caregiver Present: No  Referring Practitioner: ALANA Dougherty  Subjective  Subjective: Pt agreeable to therapy  General Comment  Comments: Pt resting in bed on approach; RN cleared pt for therapy  Pain Screening  Patient Currently in Pain: No(at rest)  Vital Signs  Patient Currently in Pain: No(at rest)       Orientation  Orientation  Overall Orientation Status: Within Functional Limits  Cognition      Objective   Bed mobility  Supine to Sit: Supervision  Sit to Supine: Supervision  Scooting: Supervision  Transfers  Sit to Stand: Stand by assistance  Stand to sit: Stand by assistance  Ambulation  Ambulation?: Yes  Ambulation 1  Surface: level tile  Device: Rolling Walker  Assistance: Contact guard assistance  Quality of Gait: No overt LOB  Gait Deviations: Slow Olga;Decreased step length;Decreased step height  Distance: 90' x 2     Balance  Sitting - Static: Good  Standing - Static: Fair;+  Standing - Dynamic: Fair  Exercises  Hip Flexion: 10 B  Hip Abduction: 10 B  Knee Long Arc Quad: 10 B  Ankle Pumps: 20 B             AM-PAC Score  AM-PAC Inpatient Mobility Raw Score : 20 (02/02/21 0958)  AM-PAC Inpatient T-Scale Score : 47.67 (02/02/21 0958)  Mobility Inpatient CMS 0-100% Score: 35.83 (02/02/21 8243)  Mobility Inpatient CMS G-Code Modifier : CJ (02/02/21 1031)          Goals  Short term goals  Time Frame for Short term goals: 1 week (2/03) unless otherwise specified  Short term goal 1: Pt will be mod I with bed mobility.  -2/02 superv  Short term goal 2: Pt will be supervision for transfers with RW.  -2/02 sba  Short term goal 3: Pt will ambulate 150 ft with supervision and RW.  -2/02 cga 90' x 2 rw  Short term goal 4: Pt will negotiate 4 stairs with rail and SBA (if d/c home). -2/02 N/T  Short term goal 5: 1/31: Pt will participate in 12-15 reps of BLE exercises to promote strength and activity tolerance.  -2/02 on-going  Patient Goals   Patient goals : \"to go home\"    Plan    Plan  Times per week: 3-5x/wk  Times per day: Daily  Specific instructions for Next Treatment: progress mobility as tolerated  Current Treatment Recommendations: Strengthening, Neuromuscular Re-education, Safety Education & Training, Balance Training, Endurance Training, Functional Mobility Training, Transfer Training, Gait Training, Equipment Evaluation, Education, & procurement, Stair training, Patient/Caregiver Education & Training  Safety Devices  Type of devices:  All fall risk precautions in place, Gait belt, Patient at risk for falls, Nurse notified, Bed alarm in place, Left in bed     Therapy Time   Individual Concurrent Group Co-treatment   Time In 0908         Time Out 0947         Minutes 39         Timed Code Treatment Minutes: [facility]

## 2021-02-02 NOTE — PROGRESS NOTES
Hospitalist Progress Note      PCP: Cal Bell    Date of Admission: 1/26/2021    Chief Complaint:   Weakness.  Frequent falls       Hospital Course:  H & P reviewed. Subjective:         S/p colonoscopy yesterday. Reports left jaw pain. Denies any fever chills      Medications:  Reviewed    Infusion Medications    sodium chloride      dextrose 5 % and 0.45 % NaCl 20 mL/hr at 01/31/21 1659    dextrose       Scheduled Medications    cefTRIAXone (ROCEPHIN) IV  1,000 mg Intravenous Q24H    lidocaine 1 % injection  5 mL Intradermal Once    sodium chloride flush  10 mL Intravenous 2 times per day    insulin glargine  10 Units Subcutaneous QAM    paliperidone  6 mg Oral QAM    lactobacillus  1 capsule Oral Daily with breakfast    aspirin  81 mg Oral Daily    cetirizine  10 mg Oral Daily    fluticasone  2 spray Each Nostril Daily    gabapentin  600 mg Oral TID    lisinopril  10 mg Oral Daily    pantoprazole  40 mg Oral QAM AC    atorvastatin  20 mg Oral Daily    [Held by provider] ticagrelor  90 mg Oral BID    sodium chloride flush  10 mL Intravenous 2 times per day    [Held by provider] enoxaparin  40 mg Subcutaneous Daily    insulin lispro  0-12 Units Subcutaneous TID WC    insulin lispro  0-6 Units Subcutaneous Nightly     PRN Meds: sodium chloride, sodium chloride flush, melatonin, oxyCODONE-acetaminophen, sodium chloride flush, promethazine **OR** ondansetron, magnesium hydroxide, acetaminophen **OR** acetaminophen, glucose, dextrose, glucagon (rDNA), dextrose      Intake/Output Summary (Last 24 hours) at 2/2/2021 1541  Last data filed at 2/1/2021 2130  Gross per 24 hour   Intake 200 ml   Output --   Net 200 ml       Physical Exam Performed:    BP (!) 97/59   Pulse 65   Temp 98.8 °F (37.1 °C) (Oral)   Resp 18   Ht 5' 3\" (1.6 m)   Wt 115 lb 14.4 oz (52.6 kg)   SpO2 97%   BMI 20.53 kg/m²      General appearance: She is lying in bed.   In no acute distress, is alert  and cooperative. HEENT: Pupils equal, round, Conjunctivae/corneas clear. Neck: Supple, with full range of motion. No jugular venous distention. Trachea midline. Swelling/mass on left jaw with fluctuance with no overt discharge or redness  Respiratory:  Normal respiratory effort. Clear to auscultation, bilaterally without Rales/Wheezes/Rhonchi. Cardiovascular: Regular rate and rhythm with normal S1/S2 . Abdomen: Soft, non-tender, non-distended with normal bowel sounds. Musculoskeletal: No clubbing, cyanosis or edema bilaterally. Neurologic:   Alert, speech clear with no overt facial droop  Psychiatric: Alert and oriented, thought content appropriate, normal insight        Labs:   Recent Labs     01/31/21  0708 02/01/21  0500 02/01/21  1845 02/02/21  1055   WBC 6.1 5.4  --  5.7   HGB 7.1* 5.0*  --  8.1*   HCT 22.9* 15.7* 21.0* 24.4*    185  --  157     Recent Labs     01/31/21  0708 02/01/21  0500 02/02/21  1055    136 131*   K 4.1 4.7 4.7    107 100   CO2 23 26 29   BUN 9 11 13   CREATININE 0.9 0.9 1.0   CALCIUM 7.9* 7.8* 8.0*     No results for input(s): AST, ALT, BILIDIR, BILITOT, ALKPHOS in the last 72 hours. No results for input(s): INR in the last 72 hours. No results for input(s): Kaitlynn Older in the last 72 hours. Urinalysis:      Lab Results   Component Value Date    NITRU Negative 02/01/2021    WBCUA 0-2 02/01/2021    BACTERIA 2+ 11/11/2020    RBCUA 5-10 02/01/2021    BLOODU TRACE-INTACT 02/01/2021    SPECGRAV 1.015 02/01/2021    GLUCOSEU 500 02/01/2021    GLUCOSEU >=1000 mg/dL 06/07/2010       Radiology:  CT CHEST W CONTRAST   Final Result   Right upper extremity PICC line has changed in position as compared to recent   radiograph, with tip no longer in the superior vena cava. The tip of the   catheter now appears closely associated with the anterior wall of the left   brachiocephalic vein. Recommend repositioning. Small bilateral pleural effusions.   Bibasilar opacity likely reflects   atelectasis in the absence of clinical signs or symptoms of pneumonitis. Atherosclerosis, including coronary artery calcification. No significant interval change in 3 mm right upper lobe pulmonary nodule as   compared to prior. Soft tissue anasarca. Findings were called by the radiology call center. CTA ABDOMEN PELVIS W CONTRAST   Final Result   No definitive evidence of active arterial extravasation. No hematoma. Close   follow-up recommended. No evidence of acute process in the abdomen/pelvis, as discussed. Progression of the patient's chronic calcific pancreatitis is slightly   increased pancreatic dilation since July 2019. XR CHEST PORTABLE   Final Result   Mild right basilar airspace disease, atelectasis versus pneumonia. CT CERVICAL SPINE WO CONTRAST   Final Result   No acute abnormality of the cervical spine. Focal spondylosis at C5-6. CT HEAD WO CONTRAST   Final Result   No acute intracranial abnormality. Opacification of the right maxillary sinus, suggesting sinusitis. XR FOOT RIGHT (2 VIEWS)   Final Result   Chronic findings are noted. No acute radiographic abnormality is appreciated. XR CHEST (2 VW)   Final Result   No acute cardiopulmonary disease. IR REPLACE PICC WO SQ PORT SAME ACCESS    (Results Pending)   CT BONE MARROW ASPIRATION    (Results Pending)           Assessment/Plan:    Active Hospital Problems    Diagnosis    Frequent falls [R29.6]     Progressive weakness  Probably secondary to anemia and a UTI. Noted accompanied by loss of weight and anemia.  UTI may not be enough to explain  GI was consulted. Rhianna Ayon has been done this admission, results as noted it was essentially unremarkable. For further work-up with colonoscopy planned today. She has been seen by PT/OT and they do recommend the time of discharge she go to SNF.     Progressive anemia: Unclear etiology.  s/p 2 PRBCs on 2/1 with improvement. EGD remarkable and underwent colonoscopy on 2/1 with poor prep- unrevealing of any acute abnormality. CTA abdomen with no intra-abdominal bleeding. Heme-onc recs appreciated. Bone marrow biopsy by IR on planned. Monitor CBC daily and transfuse as needed for hemoglobin less than 7             Diabetes mellitus type 2 with hyperglycemia: not controlled. Hemoglobin A1c is 13.8%, indicating none compliance with diet. Continue lantus and SSI as ordered        Abnormal urinalysis, presumed UTI: UC grew E coli. Was on Bactrim which was later stopped and developed fever. IV ceftriaxone started 2/1. Follow blood cultures repeat urine culture. Left jaw mass: ? Abscess vrs mass. CT neck to eval          Hypertension: Controlled,  continue BP meds        Coronary artery disease  Currently seems stable has no symptoms will continue antiplatelets and statin. Almetta Pretty is being held due to her anemia     Breast cancer  In remission.    letrozole DC'd per heme-onc     Dyslipidemia  Continue statin       PICC line was repositioned based on CT findings        DVT Prophylaxis: hold Lovenox for anemia     diet: DIET CARB CONTROL;   Dietary Nutrition Supplements: Standard High Calorie Oral Supplement, Diabetic Oral Supplement  Code Status: Full Code    PT/OT Eval Status: Consulted recommend SNF at discharge    Dispo - pending clinical course     Parker Truong MD

## 2021-02-02 NOTE — PROGRESS NOTES
Weight: 115 lb 14 oz (52.6 kg)   · Usual Body Weight: 167 lb (75.8 kg)(2/2020)     · Ideal Body Weight: 115 lbs; % Ideal Body Weight     · BMI: 20.5  · BMI Categories: Normal Weight (BMI 18.5-24. 9)       Nutrition Diagnosis:   · Inadequate oral intake related to early satiety as evidenced by poor intake prior to admission      Nutrition Interventions:   Food and/or Nutrient Delivery:  Continue Current Diet, Continue Oral Nutrition Supplement  Nutrition Education/Counseling:  No recommendation at this time   Coordination of Nutrition Care:  Continue to monitor while inpatient    Goals:   Tolerate diet and consume greater than 50% of meals and ONS this admission       Nutrition Monitoring and Evaluation:   Behavioral-Environmental Outcomes:      Food/Nutrient Intake Outcomes:  Food and Nutrient Intake, Supplement Intake  Physical Signs/Symptoms Outcomes:  GI Status, Hemodynamic Status     Discharge Planning:    Continue current diet, Continue Oral Nutrition Supplement     Electronically signed by Dian Pimentel RD, LD on 2/2/21 at 4:42 PM EST    Contact: Office: [phone]; [address] [place] [address] Road: [phone]

## 2021-02-03 ENCOUNTER — APPOINTMENT (OUTPATIENT)
Dept: CT IMAGING | Age: 61
DRG: 951 | End: 2021-02-03
Payer: MEDICAID

## 2021-02-03 LAB
ALBUMIN SERPL-MCNC: 2.4 G/DL (ref 3.1–4.9)
ALPHA-1-GLOBULIN: 0.3 G/DL (ref 0.2–0.4)
ALPHA-2-GLOBULIN: 0.6 G/DL (ref 0.4–1.1)
ANION GAP SERPL CALCULATED.3IONS-SCNC: 4 MMOL/L (ref 3–16)
BASOPHILS ABSOLUTE: 0 K/UL (ref 0–0.2)
BASOPHILS RELATIVE PERCENT: 0.3 %
BETA GLOBULIN: 1.1 G/DL (ref 0.9–1.6)
BETA-2 MICROGLOBULIN: 2.8 MG/L (ref 1.1–2.4)
BUN BLDV-MCNC: 12 MG/DL (ref 7–20)
CALCIUM SERPL-MCNC: 8.1 MG/DL (ref 8.3–10.6)
CHLORIDE BLD-SCNC: 103 MMOL/L (ref 99–110)
CO2: 28 MMOL/L (ref 21–32)
CREAT SERPL-MCNC: 1 MG/DL (ref 0.6–1.2)
EOSINOPHILS ABSOLUTE: 0.1 K/UL (ref 0–0.6)
EOSINOPHILS RELATIVE PERCENT: 1.2 %
GAMMA GLOBULIN: 1.5 G/DL (ref 0.6–1.8)
GFR AFRICAN AMERICAN: >60
GFR NON-AFRICAN AMERICAN: 56
GLUCOSE BLD-MCNC: 210 MG/DL (ref 70–99)
GLUCOSE BLD-MCNC: 265 MG/DL (ref 70–99)
GLUCOSE BLD-MCNC: 300 MG/DL (ref 70–99)
GLUCOSE BLD-MCNC: 323 MG/DL (ref 70–99)
GLUCOSE BLD-MCNC: 335 MG/DL (ref 70–99)
HCT VFR BLD CALC: 24.4 % (ref 36–48)
HEMOGLOBIN: 8 G/DL (ref 12–16)
INR BLD: 1.01 (ref 0.86–1.14)
LYMPHOCYTES ABSOLUTE: 1.8 K/UL (ref 1–5.1)
LYMPHOCYTES RELATIVE PERCENT: 29.8 %
MCH RBC QN AUTO: 29.6 PG (ref 26–34)
MCHC RBC AUTO-ENTMCNC: 32.9 G/DL (ref 31–36)
MCV RBC AUTO: 89.9 FL (ref 80–100)
MONOCYTES ABSOLUTE: 0.5 K/UL (ref 0–1.3)
MONOCYTES RELATIVE PERCENT: 8.9 %
NEUTROPHILS ABSOLUTE: 3.6 K/UL (ref 1.7–7.7)
NEUTROPHILS RELATIVE PERCENT: 59.8 %
ORGANISM: ABNORMAL
ORGANISM: ABNORMAL
PDW BLD-RTO: 15.7 % (ref 12.4–15.4)
PERFORMED ON: ABNORMAL
PLATELET # BLD: 166 K/UL (ref 135–450)
PMV BLD AUTO: 9.7 FL (ref 5–10.5)
POTASSIUM SERPL-SCNC: 4.4 MMOL/L (ref 3.5–5.1)
PROTHROMBIN TIME: 11.7 SEC (ref 10–13.2)
RBC # BLD: 2.71 M/UL (ref 4–5.2)
REASON FOR REJECTION: NORMAL
REJECTED TEST: NORMAL
SODIUM BLD-SCNC: 135 MMOL/L (ref 136–145)
SPE/IFE INTERPRETATION: NORMAL
TOTAL PROTEIN: 5.8 G/DL (ref 6.4–8.2)
URINE CULTURE, ROUTINE: ABNORMAL
URINE CULTURE, ROUTINE: ABNORMAL
WBC # BLD: 6 K/UL (ref 4–11)

## 2021-02-03 PROCEDURE — 85025 COMPLETE CBC W/AUTO DIFF WBC: CPT

## 2021-02-03 PROCEDURE — 87070 CULTURE OTHR SPECIMN AEROBIC: CPT

## 2021-02-03 PROCEDURE — 88360 TUMOR IMMUNOHISTOCHEM/MANUAL: CPT

## 2021-02-03 PROCEDURE — 97110 THERAPEUTIC EXERCISES: CPT

## 2021-02-03 PROCEDURE — 6370000000 HC RX 637 (ALT 250 FOR IP): Performed by: OTOLARYNGOLOGY

## 2021-02-03 PROCEDURE — 97116 GAIT TRAINING THERAPY: CPT

## 2021-02-03 PROCEDURE — 2580000003 HC RX 258: Performed by: INTERNAL MEDICINE

## 2021-02-03 PROCEDURE — 6370000000 HC RX 637 (ALT 250 FOR IP): Performed by: INTERNAL MEDICINE

## 2021-02-03 PROCEDURE — 97530 THERAPEUTIC ACTIVITIES: CPT

## 2021-02-03 PROCEDURE — 2700000000 HC OXYGEN THERAPY PER DAY

## 2021-02-03 PROCEDURE — 99254 IP/OBS CNSLTJ NEW/EST MOD 60: CPT | Performed by: INTERNAL MEDICINE

## 2021-02-03 PROCEDURE — 97535 SELF CARE MNGMENT TRAINING: CPT

## 2021-02-03 PROCEDURE — 99253 IP/OBS CNSLTJ NEW/EST LOW 45: CPT | Performed by: OTOLARYNGOLOGY

## 2021-02-03 PROCEDURE — 88305 TISSUE EXAM BY PATHOLOGIST: CPT

## 2021-02-03 PROCEDURE — 6370000000 HC RX 637 (ALT 250 FOR IP): Performed by: NURSE PRACTITIONER

## 2021-02-03 PROCEDURE — 88311 DECALCIFY TISSUE: CPT

## 2021-02-03 PROCEDURE — 87075 CULTR BACTERIA EXCEPT BLOOD: CPT

## 2021-02-03 PROCEDURE — 77012 CT SCAN FOR NEEDLE BIOPSY: CPT

## 2021-02-03 PROCEDURE — 88365 INSITU HYBRIDIZATION (FISH): CPT

## 2021-02-03 PROCEDURE — 6360000002 HC RX W HCPCS: Performed by: INTERNAL MEDICINE

## 2021-02-03 PROCEDURE — 0J910ZZ DRAINAGE OF FACE SUBCUTANEOUS TISSUE AND FASCIA, OPEN APPROACH: ICD-10-PCS | Performed by: INTERNAL MEDICINE

## 2021-02-03 PROCEDURE — 88364 INSITU HYBRIDIZATION (FISH): CPT

## 2021-02-03 PROCEDURE — 07DR3ZX EXTRACTION OF ILIAC BONE MARROW, PERCUTANEOUS APPROACH, DIAGNOSTIC: ICD-10-PCS | Performed by: RADIOLOGY

## 2021-02-03 PROCEDURE — 85610 PROTHROMBIN TIME: CPT

## 2021-02-03 PROCEDURE — 88341 IMHCHEM/IMCYTCHM EA ADD ANTB: CPT

## 2021-02-03 PROCEDURE — 10060 I&D ABSCESS SIMPLE/SINGLE: CPT | Performed by: OTOLARYNGOLOGY

## 2021-02-03 PROCEDURE — 88342 IMHCHEM/IMCYTCHM 1ST ANTB: CPT

## 2021-02-03 PROCEDURE — 94761 N-INVAS EAR/PLS OXIMETRY MLT: CPT

## 2021-02-03 PROCEDURE — 87205 SMEAR GRAM STAIN: CPT

## 2021-02-03 PROCEDURE — 1200000000 HC SEMI PRIVATE

## 2021-02-03 PROCEDURE — 6360000002 HC RX W HCPCS: Performed by: RADIOLOGY

## 2021-02-03 PROCEDURE — 80048 BASIC METABOLIC PNL TOTAL CA: CPT

## 2021-02-03 PROCEDURE — 2500000003 HC RX 250 WO HCPCS: Performed by: INTERNAL MEDICINE

## 2021-02-03 PROCEDURE — C1830 POWER BONE MARROW BX NEEDLE: HCPCS

## 2021-02-03 PROCEDURE — 88313 SPECIAL STAINS GROUP 2: CPT

## 2021-02-03 RX ORDER — LACTOBACILLUS RHAMNOSUS GG 10B CELL
2 CAPSULE ORAL 2 TIMES DAILY WITH MEALS
Status: DISCONTINUED | OUTPATIENT
Start: 2021-02-03 | End: 2021-02-05 | Stop reason: HOSPADM

## 2021-02-03 RX ORDER — MIDAZOLAM HYDROCHLORIDE 5 MG/ML
INJECTION INTRAMUSCULAR; INTRAVENOUS
Status: COMPLETED | OUTPATIENT
Start: 2021-02-03 | End: 2021-02-03

## 2021-02-03 RX ORDER — PALIPERIDONE 6 MG/1
6 TABLET, EXTENDED RELEASE ORAL EVERY MORNING
Qty: 30 TABLET | Refills: 3 | Status: SHIPPED | OUTPATIENT
Start: 2021-02-04 | End: 2021-04-06

## 2021-02-03 RX ORDER — INSULIN GLARGINE 100 [IU]/ML
30 INJECTION, SOLUTION SUBCUTANEOUS EVERY MORNING
Status: DISCONTINUED | OUTPATIENT
Start: 2021-02-04 | End: 2021-02-05 | Stop reason: HOSPADM

## 2021-02-03 RX ORDER — LISINOPRIL 5 MG/1
5 TABLET ORAL DAILY
Status: DISCONTINUED | OUTPATIENT
Start: 2021-02-04 | End: 2021-02-05 | Stop reason: HOSPADM

## 2021-02-03 RX ADMIN — INSULIN LISPRO 12 UNITS: 100 INJECTION, SOLUTION INTRAVENOUS; SUBCUTANEOUS at 12:55

## 2021-02-03 RX ADMIN — GABAPENTIN 600 MG: 300 CAPSULE ORAL at 15:24

## 2021-02-03 RX ADMIN — INSULIN LISPRO 12 UNITS: 100 INJECTION, SOLUTION INTRAVENOUS; SUBCUTANEOUS at 18:25

## 2021-02-03 RX ADMIN — OXYCODONE AND ACETAMINOPHEN 1 TABLET: 5; 325 TABLET ORAL at 22:20

## 2021-02-03 RX ADMIN — INSULIN LISPRO 3 UNITS: 100 INJECTION, SOLUTION INTRAVENOUS; SUBCUTANEOUS at 22:22

## 2021-02-03 RX ADMIN — Medication 10 ML: at 22:20

## 2021-02-03 RX ADMIN — FLUTICASONE PROPIONATE 2 SPRAY: 50 SPRAY, METERED NASAL at 10:09

## 2021-02-03 RX ADMIN — MUPIROCIN: 20 OINTMENT TOPICAL at 10:17

## 2021-02-03 RX ADMIN — ACETAMINOPHEN 650 MG: 325 TABLET ORAL at 10:06

## 2021-02-03 RX ADMIN — PALIPERIDONE 6 MG: 3 TABLET, EXTENDED RELEASE ORAL at 09:57

## 2021-02-03 RX ADMIN — VANCOMYCIN HYDROCHLORIDE 500 MG: 500 INJECTION, POWDER, LYOPHILIZED, FOR SOLUTION INTRAVENOUS at 15:24

## 2021-02-03 RX ADMIN — INSULIN GLARGINE 20 UNITS: 100 INJECTION, SOLUTION SUBCUTANEOUS at 09:57

## 2021-02-03 RX ADMIN — Medication 5 MG: at 22:20

## 2021-02-03 RX ADMIN — Medication 2 CAPSULE: at 18:58

## 2021-02-03 RX ADMIN — Medication 600 MG: at 09:58

## 2021-02-03 RX ADMIN — LISINOPRIL 10 MG: 10 TABLET ORAL at 09:58

## 2021-02-03 RX ADMIN — MUPIROCIN: 20 OINTMENT TOPICAL at 22:19

## 2021-02-03 RX ADMIN — GABAPENTIN 600 MG: 300 CAPSULE ORAL at 22:19

## 2021-02-03 RX ADMIN — OXYCODONE AND ACETAMINOPHEN 1 TABLET: 5; 325 TABLET ORAL at 02:48

## 2021-02-03 RX ADMIN — MIDAZOLAM HYDROCHLORIDE 0.5 MG: 5 INJECTION, SOLUTION INTRAMUSCULAR; INTRAVENOUS at 12:06

## 2021-02-03 RX ADMIN — OXYCODONE AND ACETAMINOPHEN 1 TABLET: 5; 325 TABLET ORAL at 15:25

## 2021-02-03 RX ADMIN — DEXTROSE AND SODIUM CHLORIDE: 5; 450 INJECTION, SOLUTION INTRAVENOUS at 09:57

## 2021-02-03 ASSESSMENT — PAIN SCALES - GENERAL
PAINLEVEL_OUTOF10: 8
PAINLEVEL_OUTOF10: 6
PAINLEVEL_OUTOF10: 6
PAINLEVEL_OUTOF10: 8

## 2021-02-03 NOTE — PROGRESS NOTES
R iliac crest bone marrow biopsy performed by Dr. Haritha Wallace. 0.5mg Versed given for procedure. VSS throughout. DSD applied over site. Returned to pre lara score prior to transport. Report called to [facility].

## 2021-02-03 NOTE — PROGRESS NOTES
Physical Therapy  Facility/Department: [facility] C5 - MED SURG/ORTHO  Daily Treatment Note  NAME: Jeimy Harper  : 1960  MRN: [id number]    Date of Service: 2/3/2021    Discharge Recommendations:  Subacute/Skilled Nursing Facility   PT Equipment Recommendations  Equipment Needed: No  Other: if d/c home, needs RW    Assessment   Body structures, Functions, Activity limitations: Decreased functional mobility ; Decreased balance;Decreased strength;Decreased endurance  Assessment: Demos improved balance to CGA for mobility with RW this date. Pt is normally independent, but is currently requiring use of walker and SBA for standing ADL and household distance walking. Pt does not have 24hr A at home. Pt would benefit from continued skilled therapy to address deficits. Recommend SNF at d/c due to high fall risk, lives alone, and falls frequently. Treatment Diagnosis: impaired functional mobility  Prognosis: Good  Decision Making: Low Complexity  Patient Education: Pt educated on safe mobility with RW to improve balance -- pt verbalized understanding  Barriers to Learning: none  REQUIRES PT FOLLOW UP: Yes  Activity Tolerance  Activity Tolerance: Patient Tolerated treatment well  Activity Tolerance: /59 (non-symptomatic)   63HR  100%     Patient Diagnosis(es): The primary encounter diagnosis was Acute cystitis with hematuria. Diagnoses of Frequent falls, Closed head injury, initial encounter, and Generalized weakness were also pertinent to this visit.      has a past medical history of Abnormal brain MRI, Acute bilateral low back pain without sciatica, SHARRON (acute kidney injury) ([address] Arthritis, Bipolar disorder ([address] CAD (coronary artery disease), Cancer ([address] Carotid stenosis, bilateral:<50%:per [place] 2016, Carpal tunnel syndrome, Cervical cancer screening, Coronary artery disease of native artery of native heart with stable angina pectoris ([address] DDD (degenerative disc disease), lumbar, Depression, Depression/anxiety, Depression/anxiety, Diabetes mellitus ([facility] Gout, History of mammogram, Hyperlipidemia, Hypertension, 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 has a past surgical history that includes Kidney removal; Hysterectomy; Breast lumpectomy (2015); Tubal ligation; other surgical history (Right); Cardiac catheterization (06/08/2020); Upper gastrointestinal endoscopy (N/A, 1/29/2021); Colonoscopy (N/A, 2/1/2021); and CT BIOPSY BONE MARROW (2/3/2021). Restrictions  Restrictions/Precautions  Restrictions/Precautions: Fall Risk, General Precautions  Position Activity Restriction  Other position/activity restrictions: up with assist  Subjective   General  Chart Reviewed: Yes  Response To Previous Treatment: Patient with no complaints from previous session.   Family / Caregiver Present: No  Referring Practitioner: ALANA Borjas  Subjective  Subjective: Pt agreeable to therapy  General Comment  Comments: Pt resting in bed on approach; RN cleared pt for therapy  Pain Screening  Patient Currently in Pain: Yes(did not rate pain)  Pain Assessment  Non-Pharmaceutical Pain Intervention(s): Ambulation/Increased Activity;Repositioned  Vital Signs  Patient Currently in Pain: Yes(did not rate pain)       Orientation  Orientation  Overall Orientation Status: Within Normal Limits  Cognition      Objective   Bed mobility  Supine to Sit: Modified independent  Sit to Supine: Supervision  Transfers  Sit to Stand: Stand by assistance  Stand to sit: Stand by assistance  Ambulation  Ambulation?: Yes  More Ambulation?: No(pt c/o fatigue)  Ambulation 1  Surface: level tile  Device: Rolling Walker  Assistance: Contact guard assistance  Quality of Gait: Pt tends to move too closely to objects on her R side, cues to avoid bumping into them, no overt LOB  Gait Deviations: Slow Olga;Decreased step length;Decreased step height  Distance: 100'     Balance  Sitting - Static: Good  Sitting - Dynamic: Good  Standing - Static: Fair;+  Standing - Dynamic: Fair;+  Exercises  Hip Flexion: 10 B  Hip Abduction: 10 B  Knee Long Arc Quad: 10 B  Ankle Pumps: 20 B  Comments: standing mrches x 10 w/ walker             AM-PAC Score  AM-PAC Inpatient Mobility Raw Score : 19 (02/03/21 1328)  AM-PAC Inpatient T-Scale Score : 45.44 (02/03/21 1328)  Mobility Inpatient CMS 0-100% Score: 41.77 (02/03/21 1328)  Mobility Inpatient CMS G-Code Modifier : CK (02/03/21 1328)          Goals  Short term goals  Time Frame for Short term goals: 1 week (2/03) unless otherwise specified  Short term goal 1: Pt will be mod I with bed mobility.  -2/03 mod indep  Short term goal 2: Pt will be supervision for transfers with RW.  -2/03 sba  Short term goal 3: Pt will ambulate 150 ft with supervision and RW.  -2/03 cga 100'  rw  Short term goal 4: Pt will negotiate 4 stairs with rail and SBA (if d/c home). -2/03 N/T  Short term goal 5: 1/31: Pt will participate in 12-15 reps of BLE exercises to promote strength and activity tolerance.  -2/03 on-going  Patient Goals   Patient goals : \"to go home\"    Plan    Plan  Times per week: 3-5x/wk  Times per day: Daily  Specific instructions for Next Treatment: progress mobility as tolerated  Current Treatment Recommendations: Strengthening, Neuromuscular Re-education, Safety Education & Training, Balance Training, Endurance Training, Functional Mobility Training, Transfer Training, Gait Training, Equipment Evaluation, Education, & procurement, Stair training, Patient/Caregiver Education & Training  Safety Devices  Type of devices:  All fall risk precautions in place, Gait belt, Patient at risk for falls, Nurse notified, Bed alarm in place, Left in bed     Therapy Time   Individual Concurrent Group Co-treatment   Time In 1247         Time Out 1318         Minutes 31         Timed Code Treatment Minutes: [address]

## 2021-02-03 NOTE — PROGRESS NOTES
Hospitalist Progress Note      PCP: Cherylene Morelle    Date of Admission: 1/26/2021    Chief Complaint:   Weakness.  Frequent falls       Hospital Course:  H & P reviewed. Subjective:         CT showed preauricular abscess. ENT consulted and she  underwent bedside I&D. Patient denies any hematochezia, hematemesis,  fever or chills.       Medications:  Reviewed    Infusion Medications    sodium chloride      dextrose 5 % and 0.45 % NaCl 20 mL/hr at 02/03/21 0957    dextrose       Scheduled Medications    clindamycin (CLEOCIN) IV  600 mg Intravenous Q8H    mupirocin   Topical BID    lactobacillus  2 capsule Oral BID WC    insulin glargine  20 Units Subcutaneous QAM    insulin lispro  0-18 Units Subcutaneous TID WC    insulin lispro  0-9 Units Subcutaneous Nightly    cefTRIAXone (ROCEPHIN) IV  1,000 mg Intravenous Q24H    lidocaine 1 % injection  5 mL Intradermal Once    sodium chloride flush  10 mL Intravenous 2 times per day    paliperidone  6 mg Oral QAM    aspirin  81 mg Oral Daily    cetirizine  10 mg Oral Daily    fluticasone  2 spray Each Nostril Daily    gabapentin  600 mg Oral TID    lisinopril  10 mg Oral Daily    pantoprazole  40 mg Oral QAM AC    atorvastatin  20 mg Oral Daily    [Held by provider] ticagrelor  90 mg Oral BID    sodium chloride flush  10 mL Intravenous 2 times per day    [Held by provider] enoxaparin  40 mg Subcutaneous Daily     PRN Meds: sodium chloride, sodium chloride flush, melatonin, oxyCODONE-acetaminophen, sodium chloride flush, promethazine **OR** ondansetron, magnesium hydroxide, acetaminophen **OR** acetaminophen, glucose, dextrose, glucagon (rDNA), dextrose      Intake/Output Summary (Last 24 hours) at 2/3/2021 1238  Last data filed at 2/2/2021 2300  Gross per 24 hour   Intake 300 ml   Output --   Net 300 ml       Physical Exam Performed:    /62   Pulse 60   Temp 98.4 °F (36.9 °C) (Oral)   Resp 13   Ht 5' 3\" (1.6 m)   Wt 115 lb 14.4 oz (52.6 kg)   SpO2 100%   BMI 20.53 kg/m²      General appearance: In no acute distress, is alert  and cooperative. HEENT: Pupils equal, round, Conjunctivae/corneas clear. Neck: Supple, with full range of motion. No jugular venous distention. Trachea midline. Left jaw with surgical dressing in place - not removed. Respiratory:  Normal respiratory effort. Clear to auscultation, bilaterally without Rales/Wheezes/Rhonchi. Cardiovascular: Regular rate and rhythm with normal S1/S2 . Abdomen: Soft, non-tender, non-distended with normal bowel sounds. Musculoskeletal: No clubbing, cyanosis or edema bilaterally. Neurologic:   Alert, speech clear with no overt facial droop  Psychiatric: Alert and oriented, thought content appropriate, normal insight        Labs:   Recent Labs     02/01/21  0500 02/01/21  1845 02/02/21  1055 02/03/21  0531   WBC 5.4  --  5.7 6.0   HGB 5.0*  --  8.1* 8.0*   HCT 15.7* 21.0* 24.4* 24.4*     --  157 166     Recent Labs     02/01/21  0500 02/02/21  1055 02/03/21  0505    131* 135*   K 4.7 4.7 4.4    100 103   CO2 26 29 28   BUN 11 13 12   CREATININE 0.9 1.0 1.0   CALCIUM 7.8* 8.0* 8.1*     No results for input(s): AST, ALT, BILIDIR, BILITOT, ALKPHOS in the last 72 hours. Recent Labs     02/03/21  0505   INR 1.01     No results for input(s): Abhilash Ripper in the last 72 hours. Urinalysis:      Lab Results   Component Value Date    NITRU Negative 02/01/2021    WBCUA 0-2 02/01/2021    BACTERIA 2+ 11/11/2020    RBCUA 5-10 02/01/2021    BLOODU TRACE-INTACT 02/01/2021    SPECGRAV 1.015 02/01/2021    GLUCOSEU 500 02/01/2021    GLUCOSEU >=1000 mg/dL 06/07/2010       Radiology:  CT SOFT TISSUE NECK W CONTRAST   Final Result   Abnormal subcutaneous collection with overlying skin thickening suggestive of   abscess and associated cellulitis  in the subcutaneous tissues superficial to   the parotid gland and the superficial investing fascia.   No significant   adenopathy CT CHEST W CONTRAST   Final Result   Right upper extremity PICC line has changed in position as compared to recent   radiograph, with tip no longer in the superior vena cava. The tip of the   catheter now appears closely associated with the anterior wall of the left   brachiocephalic vein. Recommend repositioning. Small bilateral pleural effusions. Bibasilar opacity likely reflects   atelectasis in the absence of clinical signs or symptoms of pneumonitis. Atherosclerosis, including coronary artery calcification. No significant interval change in 3 mm right upper lobe pulmonary nodule as   compared to prior. Soft tissue anasarca. Findings were called by the radiology call center. CTA ABDOMEN PELVIS W CONTRAST   Final Result   No definitive evidence of active arterial extravasation. No hematoma. Close   follow-up recommended. No evidence of acute process in the abdomen/pelvis, as discussed. Progression of the patient's chronic calcific pancreatitis is slightly   increased pancreatic dilation since July 2019. XR CHEST PORTABLE   Final Result   Mild right basilar airspace disease, atelectasis versus pneumonia. CT CERVICAL SPINE WO CONTRAST   Final Result   No acute abnormality of the cervical spine. Focal spondylosis at C5-6. CT HEAD WO CONTRAST   Final Result   No acute intracranial abnormality. Opacification of the right maxillary sinus, suggesting sinusitis. XR FOOT RIGHT (2 VIEWS)   Final Result   Chronic findings are noted. No acute radiographic abnormality is appreciated. XR CHEST (2 VW)   Final Result   No acute cardiopulmonary disease.          IR REPLACE PICC WO SQ PORT SAME ACCESS    (Results Pending)   CT BIOPSY BONE MARROW    (Results Pending)   CT GUIDED NEEDLE PLACEMENT    (Results Pending)           Assessment/Plan:    Active Hospital Problems    Diagnosis    Frequent falls [R29.6]    Progressive anemia: Unclear etiology. s/p 2 PRBCs on 2/1 with improvement in hgb. No overt signs of GIB. EGD remarkable and underwent colonoscopy on 2/1 with poor prep- unrevealing of any acute abnormality. CTA abdomen with no intra-abdominal bleeding. GI and hemonc assisting,   S/p BM biopsy on 2/3. Follow path   Monitor CBC daily and transfuse as needed for hemoglobin less than 7      Generalized weakness: likely due to severe debilitation, anemia, infection. PT/Ot assisting with SNF recommended- SW assisting with dispo planning        Diabetes mellitus type 2 with hyperglycemia: not controlled. Hemoglobin A1c is 13.8%, indicating none compliance with diet. dextrose in IVF likely contributing to hyperglycemia. Increase lantus to 30 units QHS and continue high dose SSI. UTI: initial UC grew E coli. Was on Bactrim which was later stopped and developed fever. Started on IV ceftriaxone. Repeat urine culture on 2/1 grew MSSA and Enterococcus. Switch abx to IV vancomycin based on sensitivities which will also treat abscess       Left preauricular cellulitis and abscess: noted on CT. ENT consulted- underwent bedside I & D on 2/3. Appreciate help. Given a dose of IV clindamycin however will d/c and start IV vanc which would treat UTI and skin infection while awaiting culture. ID consulted. Follow cultures          Hypertension: Controlled,  continue BP meds        Coronary artery disease  Currently seems stable has no symptoms will continue antiplatelets and statin.   [employer] is being held due to her anemia     Breast cancer  In remission.    letrozole DC'd per heme-onc.      Dyslipidemia  Continue statin             DVT Prophylaxis: hold Lovenox for anemia     diet: Diet NPO Time Specified  Code Status: Full Code    PT/OT Eval Status: Consulted recommend SNF at discharge    Dispo - 2-5 days pending clinical course   Bridget Tristan MD

## 2021-02-03 NOTE — CONSULTS
Infectious Diseases   Consult Note      Reason for Consult: facial abscess   Requesting Physician:  Dr. Mouna Lipscomb       Date of Admission: 1/26/2021  Subjective:   CHIEF COMPLAINT:  None given       HPI:    Yoel Hackett is a 59yoF with history of CAD, bipolar disorder, breast cancer, DM, gout, HTn, CAD, depression     ED 1/26/21 - fatigue, frequent falls    Noted to be anemic. Had EGD that was unremarkable. She had incomplete colonoscopy with poor prep. Also seen by Hematology and having BM biopsy today. She developed high grade fever early on 2/1/21. She was started on rocephin on 2/1/21 for possible UTI. She was noted to have L facial abscess and has been seen by ENT   Bedside I&D completed 2/3/21, culture collected. Her pain has diminished considerably following that procedure. WBC is wnl. Alk phos is elevated and trending down. Current abx:  Rocephin 1g IV q24, started 2/1/21  Clinda,ycin 600mg IV q8, started 2/3/21  culturelle 1 cap daily       Past Surgical History:       Diagnosis Date    Abnormal brain MRI 7/20/2017    Partially empty sella and minimal chronic small vessel ischemic disease    Acute bilateral low back pain without sciatica 11/2/2016    SHARRON (acute kidney injury) ([facility] [address] 7/5/2017    Arthritis     back    Bipolar disorder ([facility] [address] 10/18/2008    CAD (coronary artery disease)     stent placed 6/8/20    Cancer [facility]) 2015    bilateral breast:s/p lumpectomy/radiation:under care care of breast specialist:Dr. Boone     Carotid stenosis, bilateral:<50%:per US 7/2016 7/15/2016    Carpal tunnel syndrome 10/18/2008    Cervical cancer screening 2014    Nml per pt'.     Coronary artery disease of native artery of native heart with stable angina pectoris ([facility] [address] 6/9/2020    DDD (degenerative disc disease), lumbar 7/18/2018    Depression     under care of pschiatrist:Dr. Delroy Austin    Depression/anxiety 7/5/2017    Depression/anxiety     Diabetes mellitus ([facility] [address]     Gout  Diabetes Neg Hx        Current Medications:    Current Facility-Administered Medications: clindamycin (CLEOCIN) 600 mg in dextrose 5% 50 mL IVPB, 600 mg, Intravenous, Q8H  mupirocin (BACTROBAN) 2 % ointment, , Topical, BID  insulin glargine (LANTUS) injection vial 20 Units, 20 Units, Subcutaneous, QAM  insulin lispro (HUMALOG) injection vial 0-18 Units, 0-18 Units, Subcutaneous, TID WC  insulin lispro (HUMALOG) injection vial 0-9 Units, 0-9 Units, Subcutaneous, Nightly  0.9 % sodium chloride infusion, , Intravenous, PRN  cefTRIAXone (ROCEPHIN) 1000 mg IVPB in 50 mL D5W minibag, 1,000 mg, Intravenous, Q24H  lidocaine 1 % injection 5 mL, 5 mL, Intradermal, Once  sodium chloride flush 0.9 % injection 10 mL, 10 mL, Intravenous, 2 times per day  sodium chloride flush 0.9 % injection 10 mL, 10 mL, Intravenous, PRN  dextrose 5 % and 0.45 % sodium chloride infusion, , Intravenous, Continuous  melatonin disintegrating tablet 5 mg, 5 mg, Oral, Nightly PRN  paliperidone (INVEGA) extended release tablet 6 mg, 6 mg, Oral, QAM  oxyCODONE-acetaminophen (PERCOCET) 5-325 MG per tablet 1 tablet, 1 tablet, Oral, Q6H PRN  lactobacillus (CULTURELLE) capsule 1 capsule, 1 capsule, Oral, Daily with breakfast  aspirin chewable tablet 81 mg, 81 mg, Oral, Daily  cetirizine (ZYRTEC) tablet 10 mg, 10 mg, Oral, Daily  fluticasone (FLONASE) 50 MCG/ACT nasal spray 2 spray, 2 spray, Each Nostril, Daily  gabapentin (NEURONTIN) capsule 600 mg, 600 mg, Oral, TID  lisinopril (PRINIVIL;ZESTRIL) tablet 10 mg, 10 mg, Oral, Daily  pantoprazole (PROTONIX) tablet 40 mg, 40 mg, Oral, QAM AC  atorvastatin (LIPITOR) tablet 20 mg, 20 mg, Oral, Daily  [Held by provider] ticagrelor (BRILINTA) tablet 90 mg, 90 mg, Oral, BID  sodium chloride flush 0.9 % injection 10 mL, 10 mL, Intravenous, 2 times per day  sodium chloride flush 0.9 % injection 10 mL, 10 mL, Intravenous, PRN  [Held by provider] enoxaparin (LOVENOX) injection 40 mg, 40 mg, Subcutaneous, Daily  promethazine (PHENERGAN) tablet 12.5 mg, 12.5 mg, Oral, Q6H PRN **OR** ondansetron (ZOFRAN) injection 4 mg, 4 mg, Intravenous, Q6H PRN  magnesium hydroxide (MILK OF MAGNESIA) 400 MG/5ML suspension 30 mL, 30 mL, Oral, Daily PRN  acetaminophen (TYLENOL) tablet 650 mg, 650 mg, Oral, Q6H PRN **OR** acetaminophen (TYLENOL) suppository 650 mg, 650 mg, Rectal, Q6H PRN  glucose (GLUTOSE) 40 % oral gel 15 g, 15 g, Oral, PRN  dextrose 50 % IV solution, 12.5 g, Intravenous, PRN  glucagon (rDNA) injection 1 mg, 1 mg, Intramuscular, PRN  dextrose 5 % solution, 100 mL/hr, Intravenous, PRN      Allergies   Allergen Reactions    Morphine Anaphylaxis and Hives     feels like throat is closing    Penicillins Hives and Swelling    Codeine Hives and Rash    Penicillin G Rash        REVIEW OF SYSTEMS:    CONSTITUTIONAL:  She reports 60# unintentional weight loss over months despite strong appetite and po intake  F/C/NS prior to admission denied    EYES:  negative for blurred vision, eye discharge, visual disturbance and icterus  HEENT:  negative for acute hearing loss, tinnitus, ear drainage, sinus pressure, nasal congestion, epistaxis and snoring  RESPIRATORY:  No cough, shortness of breath, hemoptysis  CARDIOVASCULAR:  negative for chest pain, palpitations, exertional chest pressure/discomfort, edema, syncope  GASTROINTESTINAL:  negative for nausea, vomiting, diarrhea, constipation, blood in stool and abdominal pain  GENITOURINARY:  No dysuria/frequency  HEMATOLOGIC/LYMPHATIC:  negative for easy bruising, bleeding and lymphadenopathy  ALLERGIC/IMMUNOLOGIC:  negative for recurrent infections, angioedema, anaphylaxis and drug reactions  ENDOCRINE:  negative for weight changes and diabetic symptoms including polyuria, polydipsia and polyphagia  MUSCULOSKELETAL:  negative for acute joint swelling, decreased range of motion and muscle weakness  NEUROLOGICAL:  negative for headaches, slurred speech, unilateral weakness  PSYCHIATRIC/BEHAVIORAL: negative for hallucinations, behavioral problems, confusion and agitation. Objective:   PHYSICAL EXAM:      VITALS:  /65   Pulse 59   Temp 98.4 °F (36.9 °C) (Oral)   Resp 16   Ht 5' 3\" (1.6 m)   Wt 115 lb 14.4 oz (52.6 kg)   SpO2 100%   BMI 20.53 kg/m²      24HR INTAKE/OUTPUT:      Intake/Output Summary (Last 24 hours) at 2/3/2021 1140  Last data filed at 2/2/2021 2300  Gross per 24 hour   Intake 300 ml   Output --   Net 300 ml     CONSTITUTIONAL:  Awake, alert, cooperative, no apparent distress, and appears stated age  HEENT: PERRL, EOMI. Sclera white, conjunctiva full. OP with moist mucosal membranes, no thrush, tongue protrudes midline  3cm tender abscess infra-auricular area along jaw line L face, scant purulence noted   NECK:  Supple, symmetrical, trachea midline, no adenopathy  LUNGS:  no increased work of breathing, CTA yen   CARDIOVASCULAR:  RRR  ABDOMEN:  normal bowel sounds, soft, flat, NT   PSYCHIATRIC: Oriented to person place and time. No obvious depression or anxiety. MUSCULOSKELETAL: No obvious misalignment or effusion of the joints. No clubbing, cyanosis of the digits. SKIN:  normal skin color, texture, turgor and no redness, warmth, or swelling.  No palpable nodules or stigmata of embolic phenomenon  NEUROLOGIC: nonfocal exam  ACCESS:  IV site ok       DATA:    Old records have been reviewed    CBC:  Recent Labs     02/01/21  0500 02/01/21  1845 02/02/21  1055 02/03/21  0531   WBC 5.4  --  5.7 6.0   RBC 1.71*  --  2.71* 2.71*   HGB 5.0*  --  8.1* 8.0*   HCT 15.7* 21.0* 24.4* 24.4*     --  157 166   MCV 92.2  --  90.1 89.9   MCH 29.4  --  29.8 29.6   MCHC 31.9  --  33.1 32.9   RDW 14.8  --  16.1* 15.7*      BMP:  Recent Labs     02/01/21  0500 02/02/21  1055 02/03/21  0505    131* 135*   K 4.7 4.7 4.4    100 103   CO2 26 29 28   BUN 11 13 12   CREATININE 0.9 1.0 1.0   CALCIUM 7.8* 8.0* 8.1*   GLUCOSE 247* 507* 335* Cultures:   1/26 UC Escherichia coli  Antibiotic Interpretation ALFREDO Status    ampicillin Sensitive <=2 mcg/mL     ceFAZolin Sensitive <=4 mcg/mL      NOTE: Cefazolin should only be used for uncomplicated UTI         for E.coli or Klebsiella pneumoniae. cefepime Sensitive <=0.12 mcg/mL     cefTRIAXone Sensitive <=0.25 mcg/mL     ciprofloxacin Sensitive <=0.25 mcg/mL     ertapenem Sensitive <=0.12 mcg/mL     gentamicin Sensitive <=1 mcg/mL     levofloxacin Sensitive <=0.12 mcg/mL     nitrofurantoin Sensitive <=16 mcg/mL     piperacillin-tazobactam Sensitive <=4 mcg/mL     trimethoprim-sulfamethoxazole Sensitive <=20 mcg/mL       1/26 UA _yeast, 6-9wbc, neg LE, +nitrite  1/27 C diff neg   1/28 COVID NAAAT/RT PCR neg   2/1 UC 75,000cfu each Enterococcus  faecalis   Antibiotic Interpretation ALFREDO Status    ampicillin Sensitive <=2 mcg/mL     nitrofurantoin Sensitive <=16 mcg/mL     tetracycline Resistant >=16 mcg/mL     vancomycin Sensitive 1 mcg/mL     Staphylococcus epidermidis   Antibiotic Interpretation ALFREDO Status    nitrofurantoin Sensitive <=16 mcg/mL     oxacillin Resistant >=4 mcg/mL     tetracycline Resistant >=16 mcg/mL     trimethoprim-sulfamethoxazole Resistant 160 mcg/mL     vancomycin Sensitive 2 mcg/mL       2/1 BC x2 NGTD    HIV screen neg       Radiology Review:  All pertinent images / reports were reviewed as a part of this visit.    CT neck 2/2/21   Impression   Abnormal subcutaneous collection with overlying skin thickening suggestive of   abscess and associated cellulitis  in the subcutaneous tissues superficial to   the parotid gland and the superficial investing fascia.  No significant   adenopathy     CT chest 2/1/21  Impression   Right upper extremity PICC line has changed in position as compared to recent   radiograph, with tip no longer in the superior vena cava.  The tip of the   catheter now appears closely associated with the anterior wall of the left   brachiocephalic vein.  Recommend repositioning.       Small bilateral pleural effusions.  Bibasilar opacity likely reflects   atelectasis in the absence of clinical signs or symptoms of pneumonitis.       Atherosclerosis, including coronary artery calcification.       No significant interval change in 3 mm right upper lobe pulmonary nodule as   compared to prior.       Soft tissue anasarca.         CTA a/p 2/1/21  Impression   No definitive evidence of active arterial extravasation.  No hematoma.  Close   follow-up recommended.       No evidence of acute process in the abdomen/pelvis, as discussed.       Progression of the patient's chronic calcific pancreatitis is slightly   increased pancreatic dilation since July 2019. CXR 2/1/21  Impression   Mild right basilar airspace disease, atelectasis versus pneumonia. CT head 1/26/21  NAICA, R maxillary sinusitis    Assessment:     Patient Active Problem List   Diagnosis    Essential hypertension    Bilateral malignant neoplasm of breast in female ([address]    Microalbuminuria    Obesity (BMI 30-39. 9)    Slurred speech    Hx of ischemic CVA    Brain metastases ([address]    Carotid stenosis, bilateral:<50%:per US 7/2016    SHARRON (acute kidney injury) ([address]    Lactic acidosis    Frequent falls    Depression/anxiety    Abnormal brain MRI    Acute bilateral low back pain without sciatica    Uncontrolled type 2 diabetes mellitus with microalbuminuria, with long-term current use of insulin (HCC)    Closed fracture of right ankle, with routine healing, subsequent encounter    CKD (chronic kidney disease), stage III    Uncontrolled type 2 diabetes mellitus with diabetic nephropathy, with long-term current use of insulin (HCC)    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 ([address]    Dyslipidemia associated with type 2 diabetes mellitus ([address]    Bipolar disorder ([address]    Cardiomegaly    Cardiomyopathy ([address]    Carpal tunnel syndrome    Chronic systolic heart failure (HCC)    Diffuse cystic mastopathy    Edema    Gallstone pancreatitis    Gout    History of breast cancer    History of renal cell carcinoma    Cardiomyopathy in other diseases classified elsewhere    Mononeuritis    Myalgia and myositis    Nonspecific abnormal electrocardiogram (ECG) (EKG)    Patient in clinical research study    Peroneal muscular atrophy    Scoliosis (and kyphoscoliosis), idiopathic    SOBOE (shortness of breath on exertion)    Syncope and collapse    Chronic pain disorder    DDD (degenerative disc disease), lumbar    Diabetic polyneuropathy associated with type 2 diabetes mellitus (HCC)    Other secondary scoliosis, lumbosacral region    Thoracic spondylosis without myelopathy    Morbid obesity due to excess calories ([facility]    Age-related nuclear cataract of both eyes    Hypermetropia, bilateral    Hypertensive retinopathy, bilateral    Vitreous degeneration, bilateral    Acute bilateral low back pain with left-sided sciatica    History of CVA (cerebrovascular accident) without residual deficits    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 stage 3 chronic kidney disease (HCC)    S/P mastectomy, right    Acute cystitis without hematuria    Hypomagnesemia    Chest pain    CAD (coronary artery disease)    DKA [facility]diabetic ketoacidoses) ([facility]    DKA, type 2, not at goal [facility])    Hx of heart artery stent    Nuclear senile cataract    Unintentional weight loss       Bishop Clay is a 59yoF who is evaluated for the following:    Facial abscess s/p bedside I&D  GPC seen on stain, culture pending  -abx changed to vanc, pharmacy is dosing    Mixed bacteriuria without symptoms of cystitis, consistent with ASB. -no directed abx indicated    Fever on 2/1/21  Suspect related to facial abscess  -monitor    Anemia  S/p EGD, colon, BM bx    Unintentional weight loss  HIV screen negative.   TSH wnl   May be related to cause of anemia   Workup ongoing Will follow w you        Remberto Funez M.D. Thank you for the opportunity to participate in the care of your patient.     Please do not hesitate to contact me:   [phone] office  [phone] mobile

## 2021-02-03 NOTE — PROGRESS NOTES
Progress Note  Date:2/3/2021       Room:0531/0531-01  Patient Name:Agustina Guerra     YOB: 1960     Age:60 y.o. Subjective    Subjective:  Symptoms:  Stable. Pain:  She reports no pain. Review of Systems  Objective         Vitals Last 24 Hours:  TEMPERATURE:  Temp  Av.4 °F (36.9 °C)  Min: 98.4 °F (36.9 °C)  Max: 98.5 °F (36.9 °C)  RESPIRATIONS RANGE: Resp  Av.5  Min: 16  Max: 18  PULSE OXIMETRY RANGE: SpO2  Av %  Min: 100 %  Max: 100 %  PULSE RANGE: Pulse  Av.3  Min: 59  Max: 73  BLOOD PRESSURE RANGE: Systolic (70XTX), QMI:052 , Min:100 , TDP:302   ; Diastolic (31UUE), FLY:26, Min:65, Max:80    I/O (24Hr): Intake/Output Summary (Last 24 hours) at 2/3/2021 0914  Last data filed at 2021 2300  Gross per 24 hour   Intake 300 ml   Output --   Net 300 ml     Objective:  General Appearance: In no acute distress and not in pain. Vital signs: (most recent): Blood pressure 104/65, pulse 59, temperature 98.4 °F (36.9 °C), temperature source Oral, resp. rate 16, height 5' 3\" (1.6 m), weight 115 lb 14.4 oz (52.6 kg), SpO2 100 %, not currently breastfeeding. Abdomen: Abdomen is soft. Bowel sounds are normal.   There is no abdominal tenderness. Labs/Imaging/Diagnostics    Labs:  CBC:  Recent Labs     21  0500 21  1845 21  1055 21  0531   WBC 5.4  --  5.7 6.0   RBC 1.71*  --  2.71* 2.71*   HGB 5.0*  --  8.1* 8.0*   HCT 15.7* 21.0* 24.4* 24.4*   MCV 92.2  --  90.1 89.9   RDW 14.8  --  16.1* 15.7*     --  157 166     CHEMISTRIES:  Recent Labs     21  0500 21  1055 21  0505    131* 135*   K 4.7 4.7 4.4    100 103   CO2 26 29 28   BUN 11 13 12   CREATININE 0.9 1.0 1.0   GLUCOSE 247* 507* 335*     PT/INR:  Recent Labs     21  0505   PROTIME 11.7   INR 1.01     APTT:No results for input(s): APTT in the last 72 hours.   LIVER PROFILE:No results for input(s): AST, ALT, BILIDIR, BILITOT, ALKPHOS in the last 72 hours. Imaging Last 24 Hours:  Ct Soft Tissue Neck W Contrast    Result Date: 2/2/2021  EXAMINATION: CT OF THE NECK SOFT TISSUE WITH CONTRAST  2/2/2021 TECHNIQUE: CT of the neck was performed with the administration of intravenous contrast. Multiplanar reformatted images are provided for review. Dose modulation, iterative reconstruction, and/or weight based adjustment of the mA/kV was utilized to reduce the radiation dose to as low as reasonably achievable. COMPARISON: None. HISTORY: ORDERING SYSTEM PROVIDED HISTORY: left jaw mass TECHNOLOGIST PROVIDED HISTORY: Reason for exam:->left jaw mass Reason for Exam: left upper mandibular mass x 1 yr-increased in size FINDINGS: PHARYNX/LARYNX:  The palatine tonsils are normal in appearance. The tongue is normal in appearance. The valleculae, epiglottis, aryepiglottic folds and pyriform sinuses appear unremarkable. The true and false vocal cords are normal in appearance. No mass or abscess is seen. SALIVARY GLANDS/THYROID:  The parotid and submandibular glands appear unremarkable. The thyroid gland appears unremarkable. LYMPH NODES:  No cervical or supraclavicular lymphadenopathy is seen. SOFT TISSUES:  1.9 cm transverse by 2.7 cm AP by 4.8 cm craniocaudal subcutaneous collection. There is this overlying skin thickening. This is superficial to the is superficial investing fascia. This compresses the parotid gland but does not invade or with extend deep to the superficial investing fascia of. This may represent a subcutaneous abscess other inflammatory process cannot be excluded. This has increased in size from the prior study where it measured 0.7 cm AP by 0.7 mm transverse BRAIN/ORBITS/SINUSES:  The visualized portion of the intracranial contents appear unremarkable. The visualized portion of the orbits, paranasal sinuses and mastoid air cells demonstrate no acute abnormality.  LUNG APICES/SUPERIOR MEDIASTINUM:  No focal consolidation is seen within the visualized lung apices. No superior mediastinal lymphadenopathy or mass. The visualized portion of the trachea appears unremarkable. BONES:  No aggressive appearing lytic or blastic bony lesion. Abnormal subcutaneous collection with overlying skin thickening suggestive of abscess and associated cellulitis  in the subcutaneous tissues superficial to the parotid gland and the superficial investing fascia. No significant adenopathy     Ct Chest W Contrast    Result Date: 2/1/2021  EXAMINATION: CT OF THE CHEST WITH CONTRAST 2/1/2021 2:58 pm TECHNIQUE: CT of the chest was performed with the administration of intravenous contrast. Multiplanar reformatted images are provided for review. Dose modulation, iterative reconstruction, and/or weight based adjustment of the mA/kV was utilized to reduce the radiation dose to as low as reasonably achievable. COMPARISON: 06/05/2020, chest radiograph dated 02/01/2021 at 08:21 HISTORY: ORDERING SYSTEM PROVIDED HISTORY: eval for malignancy TECHNOLOGIST PROVIDED HISTORY: Reason for exam:->eval for malignancy Reason for Exam: eval for malignancy Acuity: Acute Type of Exam: Initial FINDINGS: Mediastinum: Calcification of the thoracic aorta. No aortic intraluminal flap. Coronary artery calcification. Within the mediastinum, no magnolia adenopathy. Right upper extremity PICC line is seen extending to the left brachiocephalic vein. The tip of the catheter appears closely associated with the anterior wall of the brachiocephalic vein. There is opacification of small collateral vessels within the anterior mediastinum. No axillary adenopathy. Lungs/pleura: Small bilateral pleural effusions are seen. Emphysema is present. 3 mm right upper lobe pulmonary nodule on series 4, image 31 is similar to prior. Patchy ground-glass opacity is seen bilaterally. Small air-filled cysts or pneumatoceles are seen at the right costophrenic angle. A similar finding had been seen on prior.   No pneumothorax. Upper Abdomen: Liver is fatty. Right kidney is absent. A portion of bowel is seen at the right renal fossa. Pancreatic ductal dilatation, incompletely imaged though grossly similar to prior. Soft Tissues/Bones: Anasarca of soft tissues. Heterogeneous attenuation of lower thoracic vertebral bodies is not significantly changed. Mild heterogeneity at the cervicothoracic junction is also similar to prior. Right upper extremity PICC line has changed in position as compared to recent radiograph, with tip no longer in the superior vena cava. The tip of the catheter now appears closely associated with the anterior wall of the left brachiocephalic vein. Recommend repositioning. Small bilateral pleural effusions. Bibasilar opacity likely reflects atelectasis in the absence of clinical signs or symptoms of pneumonitis. Atherosclerosis, including coronary artery calcification. No significant interval change in 3 mm right upper lobe pulmonary nodule as compared to prior. Soft tissue anasarca. Findings were called by the radiology call center. Cta Abdomen Pelvis W Contrast    Result Date: 2/1/2021  EXAMINATION: CTA OF THE ABDOMEN AND PELVIS WITH CONTRAST 2/1/2021 2:58 pm: TECHNIQUE: CTA of the abdomen and pelvis was performed with the administration of intravenous contrast. Multiplanar reformatted images are provided for review. MIP images are provided for review. Dose modulation, iterative reconstruction, and/or weight based adjustment of the mA/kV was utilized to reduce the radiation dose to as low as reasonably achievable. COMPARISON: CT abdomen and pelvis July 22, 2019 HISTORY: ORDERING SYSTEM PROVIDED HISTORY: severe drop in H/H TECHNOLOGIST PROVIDED HISTORY: Reason for exam:->severe drop in H/H Reason for Exam: severe drop in H/H Acuity: Acute Type of Exam: Initial FINDINGS: CTA ABDOMEN: Findings of the chest discussed on today's concurrent chest CT.  No evidence of arterial extravasation

## 2021-02-03 NOTE — CONSULTS
Edgardo      Patient Name: 53[facility] Record Number:  [id number]  Primary Care Physician:  Sonya Patel  Date of Consultation: 2/3/2021    Chief Complaint: Left facial pain    HISTORY OF PRESENT ILLNESS  Toma Johnson is a(n) 63 y.o. female admitted for anemia, uncontrolled diabetes and debility with worsening left-sided facial swelling. She reports left preauricular cyst for at least the last 2 years that has remained unchanged in size until 1 week ago when she attempted to pop it and reports expressing foul-smelling pus. Since that time it has been painful and gradually increasing in size. Denies fever, chills. Patient Active Problem List   Diagnosis    Essential hypertension    Bilateral malignant neoplasm of breast in female ([address]    Microalbuminuria    Obesity (BMI 30-39. 9)    Slurred speech    Hx of ischemic CVA    Brain metastases ([address]    Carotid stenosis, bilateral:<50%:per US 7/2016    SHRARON (acute kidney injury) ([address]    Lactic acidosis    Frequent falls    Depression/anxiety    Abnormal brain MRI    Acute bilateral low back pain without sciatica    Uncontrolled type 2 diabetes mellitus with microalbuminuria, with long-term current use of insulin (HCC)    Closed fracture of right ankle, with routine healing, subsequent encounter    CKD (chronic kidney disease), stage III    Uncontrolled type 2 diabetes mellitus with diabetic nephropathy, with long-term current use of insulin (HCC)    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 ([address]    Dyslipidemia associated with type 2 diabetes mellitus ([address]    Bipolar disorder ([address]    Cardiomegaly    Cardiomyopathy ([address]    Carpal tunnel syndrome    Chronic systolic heart failure (HCC)    Diffuse cystic mastopathy    Edema    Gallstone pancreatitis    Gout    History of breast cancer    History of renal cell carcinoma    Cardiomyopathy in other diseases classified elsewhere    Mononeuritis    Myalgia and myositis    Nonspecific abnormal electrocardiogram (ECG) (EKG)    Patient in clinical research study    Peroneal muscular atrophy    Scoliosis (and kyphoscoliosis), idiopathic    SOBOE (shortness of breath on exertion)    Syncope and collapse    Chronic pain disorder    DDD (degenerative disc disease), lumbar    Diabetic polyneuropathy associated with type 2 diabetes mellitus (HCC)    Other secondary scoliosis, lumbosacral region    Thoracic spondylosis without myelopathy    Morbid obesity due to excess calories (HCC)    Age-related nuclear cataract of both eyes    Hypermetropia, bilateral    Hypertensive retinopathy, bilateral    Vitreous degeneration, bilateral    Acute bilateral low back pain with left-sided sciatica    History of CVA (cerebrovascular accident) without residual deficits    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 stage 3 chronic kidney disease (HCC)    S/P mastectomy, right    Acute cystitis without hematuria    Hypomagnesemia    Chest pain    CAD (coronary artery disease)    [facility]KA [facility]diabetic k[facility]acidoses) [facility]    DKA, type 2, not at goal [facility])    Hx of heart artery stent    Nuclear senile cataract    Unintentional weight loss     Past Surgical History:   Procedure Laterality Date    BREAST LUMPECTOMY  2015    Bilateral:breast cancer    CARDIAC CATHETERIZATION  06/08/2020    Dr. Chari Mcgraw), DAYANA to Diag 1    COLONOSCOPY N/A 2/1/2021    COLONOSCOPY DIAGNOSTIC performed by Ignacia Acosta MD at [address]      Benign:no cervical cancer per pt'    KIDNEY REMOVAL      right    OTHER SURGICAL HISTORY Right     orif right ankle    TUBAL LIGATION      UPPER GASTROINTESTINAL ENDOSCOPY N/A 1/29/2021    EGD BIOPSY performed by Ignacia Acosta MD at [facility] ENDOSCOPY     Family History   Problem Relation Age of Onset    Cancer Mother 10 mg by mouth daily    Historical Provider, MD   Empagliflozin-metFORMIN HCl 12.5-1000 MG TABS Take 2 tablets by mouth daily 6/25/20   Ludwin Neville MD   Blood Pressure Monitor GINA Check BP at home once or twice a day 5/1/20   Ludwin Neville MD   Insulin Syringe-Needle U-100 31G X 5/16\" 0.3 ML MISC USE 3 TIMES A DAY BEFORE MEALS 1/31/20   Ludwin Neville MD   calcium carbonate-vitamin D (CALTRATE) 600-400 MG-UNIT TABS per tab 1 tablet daily  12/30/19   Historical Provider, MD   letrozole ([address] [facility]) 2.5 MG tablet TAKE 1 TABLET BY MOUTH EVERY DAY 2/1/19   Historical Provider, MD   glucagon 1 MG injection Inject 1 mg into the muscle See Admin Instructions Follow package directions for low blood sugar. 2/19/19   Ludwin Neville MD   paliperidone (INVEGA) 9 MG extended release tablet Take 9 mg by mouth every morning  1/22/18   Historical Provider, MD   aspirin 81 MG tablet Take 81 mg by mouth daily    Historical Provider, MD   gabapentin (NEURONTIN) 600 MG tablet Take 600 mg by mouth 3 times daily    Historical Provider, MD   oxyCODONE-acetaminophen (PERCOCET) 5-325 MG per tablet Take 1 tablet by mouth every 6 hours as needed for Pain. Neetu Wiggins     Historical Provider, MD   traZODone (DESYREL) 100 MG tablet Take 100 mg by mouth nightly    Historical Provider, MD       REVIEW OF SYSTEMS  The following systems were reviewed and revealed the following in addition to any already discussed in the HPI:    CONSTITUTIONAL:+ weight loss, no fever, no night sweats, no chills  EYES: no vision changes, no blurry vision  EARS: no changes in hearing, no otalgia  NOSE: no epistaxis, no rhinorrhea  RESPIRATORY: no difficulty breathing, no shortness of breath  CV: no chest pain, no palpitations  HEME: No coagulation disorder, no bleeding disorder  NEURO: + Generalized weakness  SKIN: + Left facial swelling  MOUTH: No new ulcers, no recent teeth infections  GASTROINTESTINAL: No diarrhea, stomach pain  PSYCH: No anxiety, no depression      PHYSICAL EXAM  /65   Pulse 59   Temp 98.4 °F (36.9 °C) (Oral)   Resp 16   Ht 5' 3\" (1.6 m)   Wt 115 lb 14.4 oz (52.6 kg)   SpO2 100%   BMI 20.53 kg/m²     GENERAL: No Acute Distress, Alert and Oriented, no hoarseness  EYES: EOMI, Anti-icteric  NOSE: No epistaxis, nasal mucosa within normal limits, no purulent drainage  EARS: Normal external canal appearance, EAC patent bilaterally  FACE: 5 cm x 3 cm left preauricular swelling with fluctuance and overlying erythema consistent with abscess, tender to palpation 1/6 House-Brackmann Scale, symmetric, sensation equal bilaterally  ORAL CAVITY: No masses or lesions palpated, uvula is midline, moist mucous membranes, NECK: Normal range of motion, no thyromegaly, trachea is midline, no lymphadenopathy, no neck masses, no crepitus  CHEST: Normal respiratory effort, no retractions, breathing comfortably  SKIN: No rashes, normal appearing skin, no evidence of skin lesions/tumors    RADIOLOGY  Summary of findings:  CT neck: Left preauricular abscess with surrounding cellulitic changes    PROCEDURE  Incision and drainage of abscess    Pre op Dx: Left facial abscess  Post Op: Same  Procedure: Incision and drainage  Consent: Verbal  Anesthesia: 1 % lidocaine with Epinephrine  Surgeon: Brenton Rain DO  Description:  After written consent, 1 % lidocaine with epinephrine was injected into the site. We allowed adequate time to for anesthesia to take effect. The area was cleansed with Betadine. An 11 blade was used to make a stab incision. Purulence was immediately encountered and cultured. Pressure was used to express copious amounts of foul-smelling purulent drainage. Blunt dissection was then used to break up all loculations. Abscess cavity was packed with quarter inch iodoform strip gauze. The area was dressed with Telfa. The patient tolerated the procedure well. There were no complications.     ASSESSMENT/PLAN  Katelyn Krueger is a very sanchez 63 y.o. female with left facial abscess status post incision and drainage 2/3/2021  -Discussed with patient that she likely has pre-existing cyst that became acutely infected  -Incision and drainage will aid in treatment of infection but will not cure underlying cyst, she understands to have the cyst addressed she will need additional procedure  -Culture collected  -We will see patient daily and slowly remove packing  -Patient can use warm compresses over the area to aid in pain relief  -Apply Bactroban ointment to I&D site twice a day  -Currently on clindamycin and Rocephin    Ruchi Rene, DO  Otolaryngology  Medical Decision Making:   The following items were considered in medical decision making:  Independent review of images  Review / order clinical lab tests  Review / order radiology tests  Decision to obtain old records

## 2021-02-03 NOTE — ONCOLOGY
ONCOLOGY HEMATOLOGY CARE PROGRESS NOTE      SUBJECTIVE:     Afebrile and on room air. NPO. Sleeping soundly. ROS:   The remaining 10 point review of symptoms is unremarkable. OBJECTIVE        Physical    VITALS:  /65   Pulse 59   Temp 98.4 °F (36.9 °C) (Oral)   Resp 16   Ht 5' 3\" (1.6 m)   Wt 115 lb 14.4 oz (52.6 kg)   SpO2 100%   BMI 20.53 kg/m²   TEMPERATURE:  Current - Temp: 98.4 °F (36.9 °C); Max - Temp  Av.5 °F (36.9 °C)  Min: 98.4 °F (36.9 °C)  Max: 98.8 °F (37.1 °C)  PULSE OXIMETRY RANGE: SpO2  Av.4 %  Min: 97 %  Max: 100 %  24HR INTAKE/OUTPUT:      Intake/Output Summary (Last 24 hours) at 2/3/2021 6422  Last data filed at 2021 2300  Gross per 24 hour   Intake 300 ml   Output --   Net 300 ml       CONSTITUTIONAL:  awake, alert, cooperative, no apparent distress, HEENT oral pharynx , no scleral icterus  HEMATOLOGIC/LYMPHATICS:  no cervical lymphadenopathy, no supraclavicular lymphadenopathy, no axillary lymphadenopathy and no inguinal lymphadenopathy  LUNGS:  No increased work of breathing, good air exchange, clear to auscultation bilaterally, no crackles or wheezing  CARDIOVASCULAR:  , regular rate and rhythm, normal S1 and S2, no S3 or S4, and no murmur noted  ABDOMEN:  No scars, normal bowel sounds, soft, non-distended, non-tender, no masses palpated, no hepatosplenomegally  MUSCULOSKELETAL:  There is no redness, warmth, or swelling of the joints. EXTREMETIES: No clubbing cynosis or edema  NEUROLOGIC:  Awake, alert, oriented to name, place and time. Cranial nerves II-XII are grossly intact. Motor is 5 out of 5 bilaterally.    SKIN:  no bruising or bleeding      Data      Recent Labs     21  0500 21  1845 21  1055 21  0531   WBC 5.4  --  5.7 6.0   HGB 5.0*  --  8.1* 8.0*   HCT 15.7* 21.0* 24.4* 24.4*     --  157 166   MCV 92.2  --  90.1 89.9        Recent Labs     21  0500 21  1059 02/03/21  0505    131* 135*   K 4.7 4.7 4.4    100 103   CO2 26 29 28   BUN 11 13 12   CREATININE 0.9 1.0 1.0     No results for input(s): AST, ALT, ALB, BILIDIR, BILITOT, ALKPHOS in the last 72 hours. Magnesium:    Lab Results   Component Value Date    MG 1.90 01/31/2021    MG 2.00 01/27/2021    MG 2.00 01/07/2021         Problem List  Patient Active Problem List   Diagnosis    Essential hypertension    Bilateral malignant neoplasm of breast in female ([address]    Microalbuminuria    Obesity (BMI 30-39. 9)    Slurred speech    Hx of ischemic CVA    [facility]    Carotid stenosis, bilateral:<50%:per US 7/2016    SHARRON (acute kidney injury) ([facility]    Lactic acidosis    Frequent falls    Depression/anxiety    Abnormal brain MRI    Acute bilateral low back pain without sciatica    Uncontrolled type 2 diabetes mellitus with microalbuminuria, with long-term current use of insulin (HCC)    Closed fracture of right ankle, with routine healing, subsequent encounter    CKD (chronic kidney disease), stage III    Uncontrolled type 2 diabetes mellitus with diabetic nephropathy, with long-term current use of insulin (HCC)    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 ([address]    Dyslipidemia associated with type 2 diabetes mellitus ([address]    Bipolar disorder ([address]    Cardiomegaly    Cardiomyopathy ([facility]    Carpal tunnel syndrome    Chronic systolic heart failure (HCC)    Diffuse cystic mastopathy    Edema    Gallstone pancreatitis    Gout    History of breast cancer    History of renal cell carcinoma    Cardiomyopathy in other diseases classified elsewhere    Mononeuritis    Myalgia and myositis    Nonspecific abnormal electrocardiogram (ECG) (EKG)    Patient in clinical research study    Peroneal muscular atrophy    Scoliosis (and kyphoscoliosis), idiopathic    SOBOE (shortness of breath on exertion)    Syncope and collapse    Chronic pain disorder    DDD (degenerative disc disease), lumbar    Diabetic polyneuropathy associated with type 2 diabetes mellitus (HCC)    Other secondary scoliosis, lumbosacral region    Thoracic spondylosis without myelopathy    Morbid obesity due to excess calories ([facility])    Age-related nuclear cataract of both eyes    Hypermetropia, bilateral    Hypertensive retinopathy, bilateral    Vitreous degeneration, bilateral    Acute bilateral low back pain with left-sided sciatica    History of CVA (cerebrovascular accident) without residual deficits    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 stage 3 chronic kidney disease (HCC)    S/P mastectomy, right    Acute cystitis without hematuria    Hypomagnesemia    Chest pain    CAD (coronary artery disease)    DKA (diabetic ketoacidoses) ([facility])    DKA, type 2, not at goal [facility])    Hx of heart artery stent    Nuclear senile cataract    Unintentional weight loss       ASSESSMENT AND PLAN    Anemia  - Admit CBC: WBC 5.7 K/mcL, HGB 10.1 g/dL, HCT 32.5%,  K/mcL, ANC 3.8 K/mcL, MCV 90.2 fL.  - Drifted down to HGB 5.0 g/dL and HCT 15.7% on 2/01/2021.  - Transfused 2 unit of PRBCs on 2/01/2021. Has been stable since then. - Micronutrients: Ferritin 781.3 ng/mL, iron sat 28%, B12 512 pg/mL, folate 14.87 ng/mL. - Abs retic 44 K/mcL,  U/L, hapto 87 mg/dL. - FOBT neg.  - IgG, IgA, IgM are WNL. Kappa and lambda are elevated but ratio is WNL.  - HIV neg.  - Colonoscopy, 2/01/2021, with Dr. Glo Deluna was incomplete.  - EGD, 1/29/2020, with Dr. Cody Body was normal.  Pathology from a duodenal biopsy was negative. - Awaits bone marrow biopsy. In total, blood loss seems most likely, though no source has been found and FOBT neg. Agree with bone marrow. Await pathology.     ONCOLOGIC DISPOSITION:  TBD    Zacarias Roth MD  Please contact through [facility]

## 2021-02-03 NOTE — CONSULTS
Pharmacy Note  Vancomycin Consult    Nita Hilton is a 63 y.o. female started on Vancomycin for UTI, L preauicular abscess; consult received from Dr. Ashley Sanchez to manage therapy. Allergies:  Morphine, Penicillins, Codeine, and Penicillin g     Recent Labs     02/02/21  1055 02/03/21  0505   CREATININE 1.0 1.0       Recent Labs     02/02/21  1055 02/03/21  0531   WBC 5.7 6.0       Estimated Creatinine Clearance: 49 mL/min (based on SCr of 1 mg/dL). Intake/Output Summary (Last 24 hours) at 2/3/2021 1257  Last data filed at 2/2/2021 2300  Gross per 24 hour   Intake 300 ml   Output --   Net 300 ml       Wt Readings from Last 1 Encounters:   01/26/21 115 lb 14.4 oz (52.6 kg)         Body mass index is 20.53 kg/m². Culture Date      Source                                 Results  2/1                     Urine                                     Enterococcus, Staph epi  2/3                     Left preauicular abscess      1+ GPC    Goal Vancomycin AUC: 400-600   Goal Vancomycin trough: 10-15 mcg/mL    Assessment/Plan:  Will initiate Vancomycin 500mg IVPB q12h. Calculated . Vancomycin trough ordered for 2/5 at 0130. Timing of trough level will be determined based on culture results, renal function, and clinical response. Thank you for the consult. Adeola Rae.  Alex PharmD, BCPS   2/3/2021 1:04 PM

## 2021-02-03 NOTE — PROGRESS NOTES
Occupational Therapy  Facility/Department: [facility] C5 - MED SURG/ORTHO  Daily Treatment Note  NAME: Karey Ornelas  : 1960  MRN: [id number]    Date of Service: 2/3/2021    Discharge Recommendations:  Subacute/Skilled Nursing Facility       Assessment   Performance deficits / Impairments: Decreased functional mobility ; Decreased ADL status; Decreased safe awareness;Decreased balance;Decreased endurance;Decreased coordination;Decreased high-level IADLs;Decreased strength  After treatment, pt found to be presenting with the above mentioned deficits. Pt would benefit from continued skilled occupational therapy to address these deficits, increasing safety and independence with ADL and functional mobility. Pt is normally independent, but is currently requiring use of walker and SBA for standing ADL and household distance walking. Pt does not have 24hr A at home. Will continue to assess for discharge needs. Prognosis: Good  REQUIRES OT FOLLOW UP: Yes  Activity Tolerance  Activity Tolerance: Patient Tolerated treatment well  Safety Devices  Safety Devices in place: Yes  Type of devices: Gait belt;Call light within reach;Nurse notified; Bed alarm in place; Left in bed(left in bed for ENT procedure)       Patient Diagnosis(es): The primary encounter diagnosis was Acute cystitis with hematuria. Diagnoses of Frequent falls, Closed head injury, initial encounter, and Generalized weakness were also pertinent to this visit.       has a past medical history of Abnormal brain MRI, Acute bilateral low back pain without sciatica, SHARRON (acute kidney injury) ([address] Arthritis, Bipolar disorder ([address] CAD (coronary artery disease), Cancer ([address] Carotid stenosis, bilateral:<50%:per [place] 2016, Carpal tunnel syndrome, Cervical cancer screening, Coronary artery disease of native artery of native heart with stable angina pectoris ([address] DDD (degenerative disc disease), lumbar, Depression, Depression/anxiety, Depression/anxiety, Diabetes mellitus ([facility] Gout, History of mammogram, Hyperlipidemia, Hypertension, 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 stage 3 chronic kidney disease ([address] Microalbuminuria, Neuropathy in diabetes ([facility] Non morbid obesity, Pancreatitis, S/P endoscopy, Scoliosis, Spondylosis of lumbar region without myelopathy or radiculopathy, Transient cerebral ischemia, and Unspecified cerebral artery occlusion with cerebral infarction. has a past surgical history that includes Kidney removal; Hysterectomy; Breast lumpectomy (2015); Tubal ligation; other surgical history (Right); Cardiac catheterization (06/08/2020); Upper gastrointestinal endoscopy (N/A, 1/29/2021); and Colonoscopy (N/A, 2/1/2021). Restrictions  Restrictions/Precautions  Restrictions/Precautions: Fall Risk, General Precautions  Position Activity Restriction  Other position/activity restrictions: up with assist     Subjective   General  Chart Reviewed: Yes  Patient assessed for rehabilitation services?: Yes  Family / Caregiver Present: No  Referring Practitioner: GRADY Black  Diagnosis: frequent falls  Subjective  Subjective: Pt supine upon entry. Motivated and agreeable to participate. General Comment  Comments: RN approved therapy     Vital Signs  Patient Currently in Pain: Yes(unspecified level of pain at L jawline at cyst site.  RN aware and pt satisfied/able to participate)     Orientation  Orientation  Overall Orientation Status: Within Functional Limits     Objective    ADL  Feeding: NPO(for BM biopsy)  Grooming: Stand by assistance(to brush teeth, wash hands, and wash face standing with SW)  LE Dressing: Stand by assistance(to don/doff brief seated and standing)  Toileting: Stand by assistance(hygiene and clothing management in standing with SW)           Balance  Sitting Balance: Supervision  Standing Balance: Stand by assistance(with SW)  Standing Balance  Time: stood >5 min at sink for ADLs, plus 5 mins walking in figueredo to build activity tolerance, and to t/f to/from bathroom  Functional Mobility  Functional - Mobility Device: Standard Walker  Activity: To/from bathroom(5 mins walking in figueredo to build activity tolerance)  Assist Level: Stand by assistance  Toilet Transfers  Toilet - Technique: Ambulating(with SW)  Equipment Used: Standard toilet(with R grab bar)  Toilet Transfer: Stand by assistance     Bed mobility  Supine to Sit: Modified independent(HOB raised, use of bed rail, to R)  Scooting: Supervision(to EOB)     Transfers  Sit to stand: Stand by assistance  Stand to sit: Stand by assistance     Therapeutic exercise/activity:  Pt completed 1 set of the following to build strength and functional activity tolerance for ADL, IADL, and t/fs.   SBA with light UE balance support on SW prn: 10 consecutive sit to stands                             Cognition  Overall Cognitive Status: WFL                                         Education: Role of OT, safe t/f training, safe use of DME, awareness of deficits, discharge planning, ADL as therapeutic exercise, importance of OOB     Plan   Plan  Times per week: 3-5x  Current Treatment Recommendations: Self-Care / ADL, Functional Mobility Training, Balance Training, Endurance Training, Equipment Evaluation, Education, & procurement, Safety Education & Training, Patient/Caregiver Education & Training, Strengthening, ROM, Home Management Training    AM-PAC Score        AM-[facility] Inpatient Daily Activity Raw Score: 19 (02/03/21 1126)  AM-PAC Inpatient ADL T-Scale Score : 40.22 (02/03/21 1126)  ADL Inpatient CMS 0-100% Score: 42.8 (02/03/21 1126)  ADL Inpatient CMS G-Code Modifier : CK (02/03/21 1126)    Goals  Short term goals  Time Frame for Short term goals: 1 week (2/3) unless noted - continue all goals 2/3  Short term goal 1: Perform functional transfers with supervision and RW  Short term goal 2: Perform bathroom mobility with supervision and RW  Short term goal 3: Perform LE dressing with supervision  Short term goal 4: Perform UE exer 15x each to improve endurance by 1/30  Patient Goals   Patient goals : \"Be able to walk better\"       Therapy Time   Individual Concurrent Group Co-treatment   Time In 0913         Time Out 0937         Minutes 24         Timed Code Treatment Minutes: 24 Minutes       If patient is discharged prior to next treatment session, this note will serve as the discharge summary.   Marnie Oneal, OTR/L #[id number]

## 2021-02-03 NOTE — PRE SEDATION
Sedation Pre-Procedure Note    Patient Name: Gabriel Lomeli   YOB: 1960  Room/Bed: 8959/7201-79  Medical Record Number: [id number]  Date: 2/3/2021   Time: 11:56 AM       Indication:  Concern for myelodysplastic syndrome. Abnormal labs. Consent: I have discussed with the patient and/or the patient representative the indication, alternatives, and the possible risks and/or complications of the planned procedure and the anesthesia methods. The patient and/or patient representative appear to understand and agree to proceed. Vital Signs:   Vitals:    02/03/21 0247   BP: 104/65   Pulse: 59   Resp: 16   Temp: 98.4 °F (36.9 °C)   SpO2: 100%       Past Medical History:   has a past medical history of Abnormal brain MRI, Acute bilateral low back pain without sciatica, SHARRON (acute kidney injury) ([address] Arthritis, Bipolar disorder ([address] CAD (coronary artery disease), Cancer ([address] Carotid stenosis, bilateral:<50%:per US 7/2016, Carpal tunnel syndrome, Cervical cancer screening, Coronary artery disease of native artery of native heart with stable angina pectoris ([address] DDD (degenerative disc disease), lumbar, Depression, Depression/anxiety, Depression/anxiety, Diabetes mellitus ([address] Gout, History of mammogram, Hyperlipidemia, Hypertension, 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 Past Surgical History:   has a past surgical history that includes Kidney removal; Hysterectomy; Breast lumpectomy (2015); Tubal ligation; other surgical history (Right); Cardiac catheterization (06/08/2020);  Upper gastrointestinal endoscopy (N/A, 1/29/2021); and Colonoscopy (N/A, 2/1/2021). Medications:   Scheduled Meds:    clindamycin (CLEOCIN) IV  600 mg Intravenous Q8H    mupirocin   Topical BID    lactobacillus  2 capsule Oral BID WC    insulin glargine  20 Units Subcutaneous QAM    insulin lispro  0-18 Units Subcutaneous TID WC    insulin lispro  0-9 Units Subcutaneous Nightly    cefTRIAXone (ROCEPHIN) IV  1,000 mg Intravenous Q24H    lidocaine 1 % injection  5 mL Intradermal Once    sodium chloride flush  10 mL Intravenous 2 times per day    paliperidone  6 mg Oral QAM    aspirin  81 mg Oral Daily    cetirizine  10 mg Oral Daily    fluticasone  2 spray Each Nostril Daily    gabapentin  600 mg Oral TID    lisinopril  10 mg Oral Daily    pantoprazole  40 mg Oral QAM AC    atorvastatin  20 mg Oral Daily    [Held by provider] ticagrelor  90 mg Oral BID    sodium chloride flush  10 mL Intravenous 2 times per day    [Held by provider] enoxaparin  40 mg Subcutaneous Daily     Continuous Infusions:    sodium chloride      dextrose 5 % and 0.45 % NaCl 20 mL/hr at 02/03/21 0957    dextrose       PRN Meds: sodium chloride, sodium chloride flush, melatonin, oxyCODONE-acetaminophen, sodium chloride flush, promethazine **OR** ondansetron, magnesium hydroxide, acetaminophen **OR** acetaminophen, glucose, dextrose, glucagon (rDNA), dextrose  Home Meds:   Prior to Admission medications    Medication Sig Start Date End Date Taking?  Authorizing Provider   TRUE METRIX BLOOD GLUCOSE TEST strip USE AS DIRECTED TO TEST 4 TIMES A DAY 1/7/21   April Mathis MD   JARDIANCE 25 MG tablet TAKE 1 TABLET BY MOUTH EVERY DAY IN THE MORNING 12/3/20   Historical Provider, MD   insulin glargine (LANTUS SOLOSTAR) 100 UNIT/ML injection pen Inject 25 Units into the skin every morning  Patient taking differently: Inject 26 Units into the skin every morning  10/8/20   Joceline Hennessy MD   Liraglutide (VICTOZA) 18 MG/3ML SOPN SC injection Inject 1.8 mg into the skin daily 8/6/20   [facility] 7 Days:  no  Antiplatelet drug therapy use last 7 days: no  Other anticoagulant use last 7 days: no  Additional Medication Information:        Pre-Sedation Documentation and Exam:   I have reviewed the patient's history and review of systems.     Mallampati Airway Assessment:  normal neck range of motion    Prior History of Anesthesia Complications:   none    ASA Classification:  Class 2 - A normal healthy patient with mild systemic disease    Sedation/ Anesthesia Plan:   intravenous sedation    Medications Planned:   midazolam (Versed) intravenously and fentanyl intravenously    Patient is an appropriate candidate for plan of sedation: yes    Electronically signed by Charisse Sanchez MD on 2/3/2021 at 11:56 AM

## 2021-02-03 NOTE — CARE COORDINATION
CM spoke to Judy Aguilar, pt goddaughter, who stated she will assist in supplying meds for pt a SNF d/c. She stated she would be off work tomorrow and could discuss this w/[address] [place] [address].   Lenny Melgar RN

## 2021-02-03 NOTE — PROGRESS NOTES
02/03/21 0830 Infectious disease consult perfect served to Dr. Shavon Mayo at this time and RN's number given for callback if needed.     Franklyn Mccabe, PCA

## 2021-02-03 NOTE — PROGRESS NOTES
02/03/21 0843 Otolaryngology consult called in at this time and sent out to Dr. Tonny Mcadams. RN's number given for callback if needed.     Hilary Arellano, REYNOLD

## 2021-02-03 NOTE — PROGRESS NOTES
[facility]    [facility]              [phone]                      She is admitted with falls, weakness, and hyperglycemia We are following for CKD and related issues    Interval History and plan:      Has abscess in the neck- drained  Also has anemia, BM done today  Has SHARRON  On antibiotics  Cr higher than baseline  SHARRON likely due to relative hypotension  Decrease lisinopril dose                    Assessment :     CKD Stage II with SHARRON  Due to nephrectomy for Wilms' tumor  Creatinine is 0.9  Her creatinine is normal due to compensatory hypertrophy of the contralateral  kidney  UA- glucose, trace blood, +ve nitrites, on abx now    Pr/cr ratio- normal      Hypertension   BP: (102-116)/(58-62)  Pulse:  [52-60]   BP goal inpatient [phone] systolic inpatient  BP is stable  Has Afib     Anemia    Hemoglobin low  Seen by GI   Work up pending  EGD-normal    Frequent falls  Debility  DM2- uncontrolled   CAP- sp PCI  [facility] would like to thank Bridget Tristan MD   for opportunity to serve this patient      Please call with questions at-   24 Hrs Answering service [phone] or  7 am- 5 pm via Perfect serve or cell phone  Dr.Sudhir Juliana Dick          CC/reason for consult :     CKD     HPI :     Vic Nuñez is a 63 y.o. female presented to   the hospital on 1/26/2021 with fatigue, and frequent  Falls. She has difficulty walking, and has fallen multiple  Times a home. No fever, chills,sob, no chest pain, nausea,  Vomiting or diarrhea. No LOC, dizziness. Also has right foot pain. Has significantly wt loss recently  She came to the ED and being worked up for falls and   Weakness. She is known to have Wilms' tumor, and has nephrectomy as a child. We are consulted for CKD, and also possibility of placing PICC line.     ROS:     Seen with- no family    positives in bold   Constitutional:  fever, chills, weakness, weight change, fatigue  Skin:  rash, pruritus, hair loss, bruising, dry skin, petechiae  Head, Face, Neck   headaches, swelling,  cervical adenopathy  Respiratory: shortness of breath, cough, or wheezing  Cardiovascular: chest pain, palpitations, dizzy, edema  Gastrointestinal: nausea, vomiting, diarrhea, constipation,belly pain    Yellow skin, blood in stool  Musculoskeletal:  back pain, muscle weakness, gait problems,       joint pain or swelling. Genitourinary:  dysuria, poor urine flow, flank pain, blood in urine  Neurologic:  vertigo, TIA'S, syncope, seizures, focal weakness  Psychosocial:  insomnia, anxiety, or depression. Additional positive findings: weakness as above               All other remaining systems are negative or unable to obtain        PMH/PSH/SH/Family History:     Past Medical History:   Diagnosis Date    Abnormal brain MRI 7/20/2017    Partially empty sella and minimal chronic small vessel ischemic disease    Acute bilateral low back pain without sciatica 11/2/2016    SH[facility] (acute kidney injury) ([facility] 7/5/2017    Arthritis     back    Bipolar disorder ([facility] 10/18/2008    CAD (coronary artery disease)     stent placed 6/8/20    Cancer [facility]) 2015    bilateral breast:s/p lumpectomy/radiation:under care care of breast specialist:Dr. Boone     Carotid stenosis, bilateral:<50%:per US 7/2016 7/15/2016    Carpal tunnel syndrome 10/18/2008    Cervical cancer screening 2014    Nml per pt'.     Coronary artery disease of native artery of native heart with stable angina pectoris ([facility] 6/9/2020    DDD (degenerative disc disease), lumbar 7/18/2018    Depression     under care of pschiatrist:Dr. 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  Scoliosis     Spondylosis of lumbar region without myelopathy or radiculopathy 3/10/2017    Transient cerebral ischemia 07/15/2016    TIA:7/10/16    Unspecified cerebral artery occlusion with cerebral infarction     TIA       Past Surgical History:   Procedure Laterality Date    BREAST LUMPECTOMY  2015    Bilateral:breast cancer    CARDIAC CATHETERIZATION  06/08/2020    Dr. Hany Winn), DAYANA to Diag 1    COLONOSCOPY N/A 2/1/2021    COLONOSCOPY DIAGNOSTIC performed by Jabari Chaudhry MD at [address]      Benign:no cervical cancer per pt'   [address]      right    OTHER SURGICAL HISTORY Right     orif right ankle    TUBAL LIGATION      UPPER GASTROINTESTINAL ENDOSCOPY N/A 1/29/2021    EGD BIOPSY performed by Jabari Chaudhry MD at [address]        reports that she quit smoking about 6 years ago. Her smoking use included cigarettes and cigars. She has a 10.00 pack-year smoking history. She has never used smokeless tobacco. She reports that she does not drink alcohol or use drugs. family history includes Cancer in her father and mother.          Medication:     Current Facility-Administered Medications: clindamycin (CLEOCIN) 600 mg in dextrose 5% 50 mL IVPB, 600 mg, Intravenous, Q8H  mupirocin (BACTROBAN) 2 % ointment, , Topical, BID  lactobacillus (CULTURELLE) capsule 2 capsule, 2 capsule, Oral, BID WC  insulin glargine (LANTUS) injection vial 20 Units, 20 Units, Subcutaneous, QAM  insulin lispro (HUMALOG) injection vial 0-18 Units, 0-18 Units, Subcutaneous, TID WC  insulin lispro (HUMALOG) injection vial 0-9 Units, 0-9 Units, Subcutaneous, Nightly  0.9 % sodium chloride infusion, , Intravenous, PRN  cefTRIAXone (ROCEPHIN) 1000 mg IVPB in 50 mL D5W minibag, 1,000 mg, Intravenous, Q24H  lidocaine 1 % injection 5 mL, 5 mL, Intradermal, Once  sodium chloride flush 0.9 % injection 10 mL, 10 mL, Intravenous, 2 times per day  sodium chloride flush 0.9 % injection 10 mL, 10 mL, Intravenous, PRN  dextrose 5 % and 0.45 % sodium chloride infusion, , Intravenous, Continuous  melatonin disintegrating tablet 5 mg, 5 mg, Oral, Nightly PRN  paliperidone (INVEGA) extended release tablet 6 mg, 6 mg, Oral, QAM  oxyCODONE-acetaminophen (PERCOCET) 5-325 MG per tablet 1 tablet, 1 tablet, Oral, Q6H PRN  aspirin chewable tablet 81 mg, 81 mg, Oral, Daily  cetirizine (ZYRTEC) tablet 10 mg, 10 mg, Oral, Daily  fluticasone (FLONASE) 50 MCG/ACT nasal spray 2 spray, 2 spray, Each Nostril, Daily  gabapentin (NEURONTIN) capsule 600 mg, 600 mg, Oral, TID  lisinopril (PRINIVIL;ZESTRIL) tablet 10 mg, 10 mg, Oral, Daily  pantoprazole (PROTONIX) tablet 40 mg, 40 mg, Oral, QAM AC  atorvastatin (LIPITOR) tablet 20 mg, 20 mg, Oral, Daily  [Held by provider] ticagrelor (BRILINTA) tablet 90 mg, 90 mg, Oral, BID  sodium chloride flush 0.9 % injection 10 mL, 10 mL, Intravenous, 2 times per day  sodium chloride flush 0.9 % injection 10 mL, 10 mL, Intravenous, PRN  [Held by provider] enoxaparin (LOVENOX) injection 40 mg, 40 mg, Subcutaneous, Daily  promethazine (PHENERGAN) tablet 12.5 mg, 12.5 mg, Oral, Q6H PRN **OR** ondansetron (ZOFRAN) injection 4 mg, 4 mg, Intravenous, Q6H PRN  magnesium hydroxide (MILK OF MAGNESIA) 400 MG/5ML suspension 30 mL, 30 mL, Oral, Daily PRN  acetaminophen (TYLENOL) tablet 650 mg, 650 mg, Oral, Q6H PRN **OR** acetaminophen (TYLENOL) suppository 650 mg, 650 mg, Rectal, Q6H PRN  glucose (GLUTOSE) 40 % oral gel 15 g, 15 g, Oral, PRN  dextrose 50 % IV solution, 12.5 g, Intravenous, PRN  glucagon (rDNA) injection 1 mg, 1 mg, Intramuscular, PRN  dextrose 5 % solution, 100 mL/hr, Intravenous, PRN       Vitals :     Vitals: 02/03/21 1212   BP: 107/62   Pulse: 60   Resp: 13   Temp:    SpO2: 100%       I & O :       Intake/Output Summary (Last 24 hours) at 2/3/2021 1236  Last data filed at 2/2/2021 2300  Gross per 24 hour   Intake 300 ml   Output --   Net 300 ml        Physical Examination :     General appearance: Anxious- no, distressed- no, in good spirits- no  HEENT: Lips- normal, teeth- ok , oral mucosa- moist  Neck : Mass- no, appears symmetrical, JVD- not visible  Respiratory: Respiratory effort-  normal, wheeze- no, crackles - no  Cardiovascular:  Ausculation- No M/R/G, Edema no  Abdomen: visible mass- no, distention- no, scar- no, tenderness- no                            hepatosplenomegaly-  no  Musculoskeletal:  clubbing no,cyanosis- no , digital ischemia- no                           muscle strength- grossly normal , tone - grossly normal  Skin: rashes- no , ulcers- no, induration- no, tightening - no  Psychiatric:  Judgement and insight- normal           AAO X 3  Additional finding: -      LABS:     Recent Labs     02/01/21  0500 02/01/21  1845 02/02/21  1055 02/03/21  0531   WBC 5.4  --  5.7 6.0   HGB 5.0*  --  8.1* 8.0*   HCT 15.7* 21.0* 24.4* 24.4*     --  157 166     Recent Labs     02/01/21  0500 02/02/21  1055 02/03/21  0505    131* 135*   K 4.7 4.7 4.4    100 103   CO2 26 29 28   BUN 11 13 12   CREATININE 0.9 1.0 1.0   GLUCOSE 247* 507* 335*

## 2021-02-04 LAB
ANION GAP SERPL CALCULATED.3IONS-SCNC: 5 MMOL/L (ref 3–16)
BASOPHILS ABSOLUTE: 0.1 K/UL (ref 0–0.2)
BASOPHILS RELATIVE PERCENT: 0.9 %
BUN BLDV-MCNC: 14 MG/DL (ref 7–20)
CALCIUM SERPL-MCNC: 8.2 MG/DL (ref 8.3–10.6)
CHLORIDE BLD-SCNC: 106 MMOL/L (ref 99–110)
CO2: 29 MMOL/L (ref 21–32)
CREAT SERPL-MCNC: 1 MG/DL (ref 0.6–1.2)
EOSINOPHILS ABSOLUTE: 0.1 K/UL (ref 0–0.6)
EOSINOPHILS RELATIVE PERCENT: 1.1 %
GFR AFRICAN AMERICAN: >60
GFR NON-AFRICAN AMERICAN: 56
GLUCOSE BLD-MCNC: 112 MG/DL (ref 70–99)
GLUCOSE BLD-MCNC: 122 MG/DL (ref 70–99)
GLUCOSE BLD-MCNC: 260 MG/DL (ref 70–99)
GLUCOSE BLD-MCNC: 305 MG/DL (ref 70–99)
GLUCOSE BLD-MCNC: 93 MG/DL (ref 70–99)
HCT VFR BLD CALC: 23.2 % (ref 36–48)
HEMOGLOBIN: 7.5 G/DL (ref 12–16)
LYMPHOCYTES ABSOLUTE: 2.5 K/UL (ref 1–5.1)
LYMPHOCYTES RELATIVE PERCENT: 43.1 %
MCH RBC QN AUTO: 29.6 PG (ref 26–34)
MCHC RBC AUTO-ENTMCNC: 32.6 G/DL (ref 31–36)
MCV RBC AUTO: 91.1 FL (ref 80–100)
MONOCYTES ABSOLUTE: 0.6 K/UL (ref 0–1.3)
MONOCYTES RELATIVE PERCENT: 10.9 %
NEUTROPHILS ABSOLUTE: 2.6 K/UL (ref 1.7–7.7)
NEUTROPHILS RELATIVE PERCENT: 44 %
PDW BLD-RTO: 15.2 % (ref 12.4–15.4)
PERFORMED ON: ABNORMAL
PERFORMED ON: NORMAL
PLATELET # BLD: 199 K/UL (ref 135–450)
PMV BLD AUTO: 10.1 FL (ref 5–10.5)
POTASSIUM SERPL-SCNC: 4.6 MMOL/L (ref 3.5–5.1)
RBC # BLD: 2.55 M/UL (ref 4–5.2)
SODIUM BLD-SCNC: 140 MMOL/L (ref 136–145)
WBC # BLD: 5.9 K/UL (ref 4–11)

## 2021-02-04 PROCEDURE — 85025 COMPLETE CBC W/AUTO DIFF WBC: CPT

## 2021-02-04 PROCEDURE — 97535 SELF CARE MNGMENT TRAINING: CPT

## 2021-02-04 PROCEDURE — 6370000000 HC RX 637 (ALT 250 FOR IP): Performed by: INTERNAL MEDICINE

## 2021-02-04 PROCEDURE — 6370000000 HC RX 637 (ALT 250 FOR IP): Performed by: NURSE PRACTITIONER

## 2021-02-04 PROCEDURE — 2580000003 HC RX 258: Performed by: INTERNAL MEDICINE

## 2021-02-04 PROCEDURE — 6360000002 HC RX W HCPCS: Performed by: INTERNAL MEDICINE

## 2021-02-04 PROCEDURE — 97116 GAIT TRAINING THERAPY: CPT

## 2021-02-04 PROCEDURE — 97110 THERAPEUTIC EXERCISES: CPT

## 2021-02-04 PROCEDURE — 80048 BASIC METABOLIC PNL TOTAL CA: CPT

## 2021-02-04 PROCEDURE — 6370000000 HC RX 637 (ALT 250 FOR IP): Performed by: OTOLARYNGOLOGY

## 2021-02-04 PROCEDURE — 2580000003 HC RX 258: Performed by: NURSE PRACTITIONER

## 2021-02-04 PROCEDURE — 1200000000 HC SEMI PRIVATE

## 2021-02-04 PROCEDURE — 99231 SBSQ HOSP IP/OBS SF/LOW 25: CPT | Performed by: INTERNAL MEDICINE

## 2021-02-04 RX ADMIN — GABAPENTIN 600 MG: 300 CAPSULE ORAL at 22:16

## 2021-02-04 RX ADMIN — Medication 2 CAPSULE: at 08:32

## 2021-02-04 RX ADMIN — CETIRIZINE HYDROCHLORIDE 10 MG: 10 TABLET, FILM COATED ORAL at 08:32

## 2021-02-04 RX ADMIN — Medication 2 CAPSULE: at 16:56

## 2021-02-04 RX ADMIN — FLUTICASONE PROPIONATE 2 SPRAY: 50 SPRAY, METERED NASAL at 08:45

## 2021-02-04 RX ADMIN — SODIUM CHLORIDE, PRESERVATIVE FREE 10 ML: 5 INJECTION INTRAVENOUS at 22:18

## 2021-02-04 RX ADMIN — ASPIRIN 81 MG: 81 TABLET, CHEWABLE ORAL at 08:32

## 2021-02-04 RX ADMIN — ATORVASTATIN CALCIUM 20 MG: 10 TABLET, FILM COATED ORAL at 08:32

## 2021-02-04 RX ADMIN — DEXTROSE AND SODIUM CHLORIDE: 5; 450 INJECTION, SOLUTION INTRAVENOUS at 08:44

## 2021-02-04 RX ADMIN — OXYCODONE AND ACETAMINOPHEN 1 TABLET: 5; 325 TABLET ORAL at 22:16

## 2021-02-04 RX ADMIN — INSULIN GLARGINE 30 UNITS: 100 INJECTION, SOLUTION SUBCUTANEOUS at 08:32

## 2021-02-04 RX ADMIN — VANCOMYCIN HYDROCHLORIDE 500 MG: 500 INJECTION, POWDER, LYOPHILIZED, FOR SOLUTION INTRAVENOUS at 01:17

## 2021-02-04 RX ADMIN — GABAPENTIN 600 MG: 300 CAPSULE ORAL at 14:38

## 2021-02-04 RX ADMIN — INSULIN LISPRO 12 UNITS: 100 INJECTION, SOLUTION INTRAVENOUS; SUBCUTANEOUS at 11:58

## 2021-02-04 RX ADMIN — PANTOPRAZOLE SODIUM 40 MG: 40 TABLET, DELAYED RELEASE ORAL at 05:53

## 2021-02-04 RX ADMIN — GABAPENTIN 600 MG: 300 CAPSULE ORAL at 08:32

## 2021-02-04 RX ADMIN — OXYCODONE AND ACETAMINOPHEN 1 TABLET: 5; 325 TABLET ORAL at 08:32

## 2021-02-04 RX ADMIN — PALIPERIDONE 6 MG: 3 TABLET, EXTENDED RELEASE ORAL at 08:32

## 2021-02-04 RX ADMIN — INSULIN LISPRO 5 UNITS: 100 INJECTION, SOLUTION INTRAVENOUS; SUBCUTANEOUS at 22:17

## 2021-02-04 RX ADMIN — Medication 10 ML: at 22:17

## 2021-02-04 RX ADMIN — SODIUM CHLORIDE, PRESERVATIVE FREE 10 ML: 5 INJECTION INTRAVENOUS at 08:37

## 2021-02-04 RX ADMIN — Medication 10 ML: at 08:32

## 2021-02-04 RX ADMIN — VANCOMYCIN HYDROCHLORIDE 500 MG: 500 INJECTION, POWDER, LYOPHILIZED, FOR SOLUTION INTRAVENOUS at 14:38

## 2021-02-04 RX ADMIN — MUPIROCIN: 20 OINTMENT TOPICAL at 22:18

## 2021-02-04 RX ADMIN — MUPIROCIN: 20 OINTMENT TOPICAL at 08:32

## 2021-02-04 ASSESSMENT — PAIN SCALES - GENERAL
PAINLEVEL_OUTOF10: 3
PAINLEVEL_OUTOF10: 0
PAINLEVEL_OUTOF10: 7

## 2021-02-04 ASSESSMENT — PAIN DESCRIPTION - LOCATION: LOCATION: FACE;EAR

## 2021-02-04 ASSESSMENT — PAIN DESCRIPTION - PAIN TYPE: TYPE: ACUTE PAIN

## 2021-02-04 NOTE — PROGRESS NOTES
[address] [facility]        Patient Name: Garrett Enriquez Record Number:  [id number]  Primary Care Physician:  Mary Johnson  Date of Consultation: 2/4/2021    Chief Complaint: Left facial pain    Subjective: Patient seen and examined. Reports significant improvement in left facial pain and swelling. Has had continued drainage from the site. PHYSICAL EXAM  /65   Pulse 65   Temp 98.1 °F (36.7 °C) (Oral)   Resp 16   Ht 5' 3\" (1.6 m)   Wt 115 lb 14.4 oz (52.6 kg)   SpO2 98%   BMI 20.53 kg/m²     GENERAL: No Acute Distress, Alert and Oriented, no hoarseness  EYES: EOMI, Anti-icteric  NOSE: No epistaxis, nasal mucosa within normal limits, no purulent drainage  EARS: Normal external canal appearance, EAC patent bilaterally  FACE: I&D site with packing in place. Purulent drainage present. Significant improvement in surrounding erythema. NECK: Normal range of motion, no thyromegaly, trachea is midline, no lymphadenopathy, no neck masses, no crepitus  CHEST: Normal respiratory effort, no retractions, breathing comfortably  SKIN: No rashes, normal appearing skin, no evidence of skin lesions/tumors      ASSESSMENT/PLAN  Gail Jaquez is a very pleasant 63 y.o. female with left facial abscess status post incision and drainage 2/3/2021  -Improving, persistent purulent drainage  -Start vancomycin per ID  -Gram stain shows gram-positive cocci  -2 cm of packing removed today, will continue to see patient remove packing daily    Please contact service with questions or concerns    Magnus Lin,   Otolaryngology  Medical Decision Making:   The following items were considered in medical decision making:  Independent review of images  Review / order clinical lab tests  Review / order radiology tests  Decision to obtain old records

## 2021-02-04 NOTE — PROGRESS NOTES
Physician Progress Note      Braxton Nichols  CSN #:                  [id number]  :                       1960  ADMIT DATE:       2021 3:56 PM  [address] [place] DATE:  RESPONDING  PROVIDER #:        Meeta Nickerson MD          QUERY TEXT:    Pt admitted with weakness. Noted documentation of moderate malnutrition per   dietitian consultant 21. If possible, please document in progress notes   and discharge summary:    The medical record reflects the following:  Risk Factors: 63 y.o. female w/DM, CKD, weakness and repeated falls, mobility   issues  Clinical Indicators: H/P:Per family report and patient report, it appears that   the patient has also lost about 100 pounds over the past year or so and they   are worried about her wellbeing. dietitian assessment: Malnutrition Status: Moderate malnutrition  Context:  Chronic Illness  Findings of the 6 clinical characteristics of malnutrition:  Energy Intake:  7 - 75% or less estimated energy requirements for 1 month or   longer  Weight Loss:  7 - Greater than 20% over 1 year  Treatment: dietitian consult, ONS, weights, I/Os per nursing, supportive care   and monitoring    Thank you,  Scot Rosen RN CDS  [phone]  Options provided:  -- moderate malnutrition confirmed present on admission  -- Moderate malnutrition ruled out  -- Other - I will add my own diagnosis  -- Disagree - Not applicable / Not valid  -- Disagree - Clinically unable to determine / Unknown  -- Refer to Clinical Documentation Reviewer    PROVIDER RESPONSE TEXT:    The diagnosis of moderate malnutrition was confirmed as present on admission. Query created by:  Carl Fields on 2/3/2021 2:04 PM      Electronically signed by:  Meeta Nickerson MD 2021 10:28 AM

## 2021-02-04 NOTE — PROGRESS NOTES
Page sent to Dr. Jahaira Jorge:    \"2228 This patients blood sugars are really elevated with the D5 & NS 0.45% running. Do you want to continue these fluids? Thanks\"    Per Dr. Jahaira Jorge: \"Discussed with nephro.  Ok to d/c ivf\"

## 2021-02-04 NOTE — PROGRESS NOTES
Physical Therapy  Facility/Department: [facility] C5 - MED SURG/ORTHO  Daily Treatment Note  NAME: Burton Cogan  : 1960  MRN: [id number]    Date of Service: 2021    Discharge Recommendations:  Subacute/Skilled Nursing Facility   PT Equipment Recommendations  Equipment Needed: No  Other: if d/c home, needs RW  If pt discharges prior to next PT session this note will serve as discharge summary. Assessment   Body structures, Functions, Activity limitations: Decreased functional mobility ; Decreased balance;Decreased strength;Decreased endurance  Assessment: pt continues to progress toward goals. Low BP limited distance amb and stair trial. Pt participated in LE Ex. Remains below baseline function and will benefit from skilled PT to address deficits and decrease fall risk. Recommend SNF at discharge  Treatment Diagnosis: impaired functional mobility  Specific instructions for Next Treatment: progress mobility as tolerated  PT Education: Goals; General Safety;Gait Training;PT Role;Disease Specific Education;Plan of Care; Functional Mobility Training;Transfer Training  Patient Education: Pt educated on safe mobility with RW to improve balance -- pt verbalized understanding  Barriers to Learning: none  REQUIRES PT FOLLOW UP: Yes  Activity Tolerance  Activity Tolerance: Patient Tolerated treatment well  Activity Tolerance: BP 94/57 pre amb, post amb on return to supine 110/62 with HR 62  SpO2 100% on room air     Patient Diagnosis(es): The primary encounter diagnosis was Acute cystitis with hematuria. Diagnoses of Frequent falls, Closed head injury, initial encounter, and Generalized weakness were also pertinent to this visit.      has a past medical history of Abnormal brain MRI, Acute bilateral low back pain without sciatica, SHARRON (acute kidney injury) ([facility] Arthritis, Bipolar disorder ([facility] CAD (coronary artery disease), Cancer ([facility] Carotid stenosis, bilateral:<50%:per US 2016, Carpal tunnel syndrome, Cervical Supine: Modified independent  Transfers  Sit to Stand: Supervision  Stand to sit: Supervision  Ambulation  Ambulation?: Yes  Ambulation 1  Surface: level tile  Device: Rolling Walker  Assistance: Stand by assistance  Quality of Gait: Appropriate pace, stride and foot clearance, no problems tending to R side, one episode of knee buckling at end of gait trial  Distance: 120 ft        Exercises  Straight Leg Raise: 10 x B, Last 3 reps challenging on both legs  Gluteal Sets: 15 x B  Hip Flexion: Standing slow march 10 x B  Hip Extension/Leg Presses: Sit to and from stand 5 x in succession  Ankle Pumps: Standing B heel raise 10 x Holding walker    AM-PAC Score 19/24, CK     Goals  Short term goals  Time Frame for Short term goals: 1 week (2/03) unless otherwise specified  Short term goal 1: Pt will be mod I with bed mobility.  -2/04 mod indep  Short term goal 2: Pt will be supervision for transfers with RW.  -2/04 sba  Short term goal 3: Pt will ambulate 150 ft with supervision and RW.  -2/04 SBA to amb 120 ft SBA  Short term goal 4: Pt will negotiate 4 stairs with rail and SBA (if d/c home). -2/04 N/T due to low BP  Short term goal 5: 1/31: Pt will participate in 12-15 reps of BLE exercises to promote strength and activity tolerance.  -2/04 on-going  Patient Goals   Patient goals : \"to go home\"    Plan    Plan  Times per week: 3-5x/wk  Times per day: Daily  Specific instructions for Next Treatment: progress mobility as tolerated  Current Treatment Recommendations: Strengthening, Neuromuscular Re-education, Safety Education & Training, Balance Training, Endurance Training, Functional Mobility Training, Transfer Training, Gait Training, Equipment Evaluation, Education, & procurement, Stair training, Patient/Caregiver Education & Training  Safety Devices  Type of devices:  All fall risk precautions in place, Gait belt, Patient at risk for falls, Nurse notified, Bed alarm in place, Left in bed     Therapy Time   Individual

## 2021-02-04 NOTE — ONCOLOGY
ONCOLOGY HEMATOLOGY CARE PROGRESS NOTE      SUBJECTIVE:     Now on Vanco for R neck abscess and gram + cocci. Hgb is 7.5 and plt are 199. S/p bone marrow with results pending. She is on the phone. No complaints. Feels well. ROS:   The remaining 10 point review of symptoms is unremarkable. OBJECTIVE        Physical    VITALS:  /65   Pulse 65   Temp 98.1 °F (36.7 °C) (Oral)   Resp 16   Ht 5' 3\" (1.6 m)   Wt 115 lb 14.4 oz (52.6 kg)   SpO2 98%   BMI 20.53 kg/m²   TEMPERATURE:  Current - Temp: 98.1 °F (36.7 °C); Max - Temp  Av.5 °F (36.9 °C)  Min: 98.1 °F (36.7 °C)  Max: 99 °F (37.2 °C)  PULSE OXIMETRY RANGE: SpO2  Av.6 %  Min: 98 %  Max: 100 %  24HR INTAKE/OUTPUT:    No intake or output data in the 24 hours ending 21 0939    CONSTITUTIONAL:  awake, alert, cooperative, no apparent distress, HEENT oral pharynx , no scleral icterus  HEMATOLOGIC/LYMPHATICS:  no cervical lymphadenopathy, no supraclavicular lymphadenopathy, no axillary lymphadenopathy and no inguinal lymphadenopathy  R neck dressing is C/D/I   LUNGS:  No increased work of breathing, good air exchange, clear to auscultation bilaterally, no crackles or wheezing  CARDIOVASCULAR:  , regular rate and rhythm, normal S1 and S2, no S3 or S4, and no murmur noted  ABDOMEN:  No scars, normal bowel sounds, soft, non-distended, non-tender, no masses palpated, no hepatosplenomegally  MUSCULOSKELETAL:  There is no redness, warmth, or swelling of the joints. EXTREMETIES: No clubbing cynosis or edema  NEUROLOGIC:  Awake, alert, oriented to name, place and time. Cranial nerves II-XII are grossly intact. Motor is 5 out of 5 bilaterally.    SKIN:  no bruising or bleeding      Data      Recent Labs     21  1055 21  0531 21  0555   WBC 5.7 6.0 5.9   HGB 8.1* 8.0* 7.5*   HCT 24.4* 24.4* 23.2*    166 199   MCV 90.1 89.9 91.1        Recent Labs     21  1055 21  3114 02/04/21  0555   * 135* 140   K 4.7 4.4 4.6    103 106   CO2 29 28 29   BUN 13 12 14   CREATININE 1.0 1.0 1.0     No results for input(s): AST, ALT, ALB, BILIDIR, BILITOT, ALKPHOS in the last 72 hours. Magnesium:    Lab Results   Component Value Date    MG 1.90 01/31/2021    MG 2.00 01/27/2021    MG 2.00 01/07/2021         Problem List  Patient Active Problem List   Diagnosis    Essential hypertension    Bilateral malignant neoplasm of breast in female ([address]    Microalbuminuria    Obesity (BMI 30-39. 9)    Slurred speech    Hx of ischemic CVA    [facility]    Carotid stenosis, bilateral:<50%:per US 7/2016    SHARRON (acute kidney injury) ([facility]    Lactic acidosis    Frequent falls    Depression/anxiety    Abnormal brain MRI    Acute bilateral low back pain without sciatica    Uncontrolled type 2 diabetes mellitus with microalbuminuria, with long-term current use of insulin (HCC)    Closed fracture of right ankle, with routine healing, subsequent encounter    CKD (chronic kidney disease), stage III    Uncontrolled type 2 diabetes mellitus with diabetic nephropathy, with long-term current use of insulin (HCC)    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 ([address]    Dyslipidemia associated with type 2 diabetes mellitus ([address]    Bipolar disorder ([address]    Cardiomegaly    Cardiomyopathy ([facility]    Carpal tunnel syndrome    Chronic systolic heart failure (HCC)    Diffuse cystic mastopathy    Edema    Gallstone pancreatitis    Gout    History of breast cancer    History of renal cell carcinoma    Cardiomyopathy in other diseases classified elsewhere    Mononeuritis    Myalgia and myositis    Nonspecific abnormal electrocardiogram (ECG) (EKG)    Patient in clinical research study    Peroneal muscular atrophy    Scoliosis (and kyphoscoliosis), idiopathic    SOBOE (shortness of breath on exertion)    Syncope and collapse    Chronic pain disorder    DDD (degenerative disc disease), lumbar    Diabetic polyneuropathy associated with type 2 diabetes mellitus (HCC)    Other secondary scoliosis, lumbosacral region    Thoracic spondylosis without myelopathy    Morbid obesity due to excess calories ([facility])    Age-related nuclear cataract of both eyes    Hypermetropia, bilateral    Hypertensive retinopathy, bilateral    Vitreous degeneration, bilateral    Acute bilateral low back pain with left-sided sciatica    History of CVA (cerebrovascular accident) without residual deficits    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 stage 3 chronic kidney disease (HCC)    S/P mastectomy, right    Acute cystitis without hematuria    Hypomagnesemia    Chest pain    CAD (coronary artery disease)    DKA (diabetic ketoacidoses) ([facility])    DKA, type 2, not at goal [facility])    Hx of heart artery stent    Nuclear senile cataract    Unintentional weight loss       ASSESSMENT AND PLAN    Anemia  - Admit CBC: WBC 5.7 K/mcL, HGB 10.1 g/dL, HCT 32.5%,  K/mcL, ANC 3.8 K/mcL, MCV 90.2 fL.  - Drifted down to HGB 5.0 g/dL and HCT 15.7% on 2/01/2021.  - Transfused 2 unit of PRBCs on 2/01/2021. Has been stable since then. - Micronutrients: Ferritin 781.3 ng/mL, iron sat 28%, B12 512 pg/mL, folate 14.87 ng/mL. - Abs retic 44 K/mcL,  U/L, hapto 87 mg/dL. - FOBT neg.  - IgG, IgA, IgM are WNL. Kappa and lambda are elevated but ratio is WNL.  - HIV neg.  - Colonoscopy, 2/01/2021, with Dr. Lady Tejada was incomplete.  - EGD, 1/29/2020, with Dr. Lady Tejada was normal.  Pathology from a duodenal biopsy was negative. - Awaits bone marrow biopsy. Completed 2/3/21    In total, blood loss seems most likely, though no source has been found and FOBT neg. Agree with bone marrow. Await pathology. Can follow up on bone marrow results outpatient if patient otherwise stable for dc on IV AT[facility] with [facility].  Defer to IM team.     Todd Ignacio DISPOSITION:  TBD    Blanca Reid, CNP   Please contact through [facility]

## 2021-02-04 NOTE — PROGRESS NOTES
distention. Trachea midline. Left jaw with surgical dressing in place - not removed. Respiratory:  Normal respiratory effort. Clear to auscultation, bilaterally without Rales/Wheezes/Rhonchi. Cardiovascular: Regular rate and rhythm with normal S1/S2 . Abdomen: Soft, non-tender, non-distended with normal bowel sounds. Musculoskeletal: No clubbing, cyanosis or edema bilaterally. Neurologic:   Alert, speech clear with no overt facial droop  Psychiatric: Alert and oriented, thought content appropriate, normal insight        Labs:   Recent Labs     02/02/21  1055 02/03/21  0531 02/04/21  0555   WBC 5.7 6.0 5.9   HGB 8.1* 8.0* 7.5*   HCT 24.4* 24.4* 23.2*    166 199     Recent Labs     02/02/21  1055 02/03/21  0505 02/04/21  0555   * 135* 140   K 4.7 4.4 4.6    103 106   CO2 29 28 29   BUN 13 12 14   CREATININE 1.0 1.0 1.0   CALCIUM 8.0* 8.1* 8.2*     No results for input(s): AST, ALT, BILIDIR, BILITOT, ALKPHOS in the last 72 hours. Recent Labs     02/03/21  0505   INR 1.01     No results for input(s): Nathalie Dunnney in the last 72 hours. Urinalysis:      Lab Results   Component Value Date    NITRU Negative 02/01/2021    WBCUA 0-2 02/01/2021    BACTERIA 2+ 11/11/2020    RBCUA 5-10 02/01/2021    BLOODU TRACE-INTACT 02/01/2021    SPECGRAV 1.015 02/01/2021    GLUCOSEU 500 02/01/2021    GLUCOSEU >=1000 mg/dL 06/07/2010       Radiology:  CT BIOPSY BONE MARROW   Final Result   Successful CT guided bone marrow aspiration and core biopsy of the right   iliac bone. CT GUIDED NEEDLE PLACEMENT   Final Result   Successful CT guided bone marrow aspiration and core biopsy of the right   iliac bone. CT SOFT TISSUE NECK W CONTRAST   Final Result   Abnormal subcutaneous collection with overlying skin thickening suggestive of   abscess and associated cellulitis  in the subcutaneous tissues superficial to   the parotid gland and the superficial investing fascia.   No significant adenopathy         CT CHEST W CONTRAST   Final Result   Right upper extremity PICC line has changed in position as compared to recent   radiograph, with tip no longer in the superior vena cava. The tip of the   catheter now appears closely associated with the anterior wall of the left   brachiocephalic vein. Recommend repositioning. Small bilateral pleural effusions. Bibasilar opacity likely reflects   atelectasis in the absence of clinical signs or symptoms of pneumonitis. Atherosclerosis, including coronary artery calcification. No significant interval change in 3 mm right upper lobe pulmonary nodule as   compared to prior. Soft tissue anasarca. Findings were called by the radiology call center. CTA ABDOMEN PELVIS W CONTRAST   Final Result   No definitive evidence of active arterial extravasation. No hematoma. Close   follow-up recommended. No evidence of acute process in the abdomen/pelvis, as discussed. Progression of the patient's chronic calcific pancreatitis is slightly   increased pancreatic dilation since July 2019. XR CHEST PORTABLE   Final Result   Mild right basilar airspace disease, atelectasis versus pneumonia. CT CERVICAL SPINE WO CONTRAST   Final Result   No acute abnormality of the cervical spine. Focal spondylosis at C5-6. CT HEAD WO CONTRAST   Final Result   No acute intracranial abnormality. Opacification of the right maxillary sinus, suggesting sinusitis. XR FOOT RIGHT (2 VIEWS)   Final Result   Chronic findings are noted. No acute radiographic abnormality is appreciated. XR CHEST (2 VW)   Final Result   No acute cardiopulmonary disease. IR REPLACE PICC WO SQ PORT SAME ACCESS    (Results Pending)           Assessment/Plan:    Active Hospital Problems    Diagnosis    Frequent falls [R29.6]              Progressive anemia: Unclear etiology. s/p 2 PRBCs on 2/1 with improvement in hgb.  No overt signs of GIB. EGD remarkable and underwent colonoscopy on 2/1 with poor prep- unrevealing of any acute abnormality. CTA abdomen with no intra-abdominal bleeding. GI and hemonc assisting,   S/p BM biopsy on 2/3. Path pending.  hgb 7.5 today . Monitor CBC daily and transfuse as needed for hemoglobin less than 7      Generalized weakness: likely due to severe debilitation, anemia, infection. PT/OT assisting with SNF recommended- SW assisting with dispo planning        Diabetes mellitus type 2 with hyperglycemia: not controlled. Hemoglobin A1c is 13.8%, indicating none compliance with diet. dextrose in IVF likely contributing to hyperglycemia. discussed with nephro- IVF  D/padmaja. Continue  Lantus,  high dose SSI. Abnormal UA : UTI initially suspected. Ruled out per ID. Likely asymptomatic bacteruria. Initial UC grew E coli. Was on Bactrim which was later stopped and developed fever. Started on IV ceftriaxone. Repeat urine culture on 2/1 grew MSSA and Enterococcus. No need for abx to treat bacteruria per ID. Ceftriaxone d/padmaja. Left facial/preauricular cellulitis and abscess: noted on CT. ENT consulted- underwent bedside I & D on 2/3. Culture pending. To continue IV vanc X1 one more day with possible transition to PO abx tomorrow if culture remain neg per discission with ID. Continue dressing changes per ENT.            Hypertension: Controlled,  continue BP meds        Coronary artery disease  Currently seems stable has no symptoms will continue antiplatelets and statin. Can resume brilinta       Breast cancer  In remission.    letrozole DC'd per heme-onc.      Dyslipidemia  Continue statin             DVT Prophylaxis:     diet: DIET CARB CONTROL;  Code Status: Full Code    PT/OT Eval Status: Consulted recommend SNF at discharge    Dispo - possibly tomorrow pending clinical course- culture result and hgb stability.    Kaylee Osei MD

## 2021-02-04 NOTE — PROGRESS NOTES
Progress Note  Date:2021       Room:[facility]/0531-01  Patient Name:Agustina Martinez     YOB: 1960     Age:60 y.o. Subjective    Subjective:  Symptoms:  Stable. Pain:  She reports no pain. Review of Systems  Objective         Vitals Last 24 Hours:  TEMPERATURE:  Temp  Av.5 °F (36.9 °C)  Min: 98.1 °F (36.7 °C)  Max: 99 °F (37.2 °C)  RESPIRATIONS RANGE: Resp  Av.6  Min: 10  Max: 18  PULSE OXIMETRY RANGE: SpO2  Av.6 %  Min: 98 %  Max: 100 %  PULSE RANGE: Pulse  Av  Min: 52  Max: 69  BLOOD PRESSURE RANGE: Systolic (10KHE), SXS:338 , Min:102 , GKT:484   ; Diastolic (65XND), JJK:45, Min:57, Max:68    I/O (24Hr): No intake or output data in the 24 hours ending 21 1022  Objective:  General Appearance: In no acute distress and not in pain. Vital signs: (most recent): Blood pressure 110/65, pulse 65, temperature 98.1 °F (36.7 °C), temperature source Oral, resp. rate 16, height 5' 3\" (1.6 m), weight 115 lb 14.4 oz (52.6 kg), SpO2 98 %, not currently breastfeeding. Abdomen: Abdomen is soft. Bowel sounds are normal.   There is no abdominal tenderness. Labs/Imaging/Diagnostics    Labs:  CBC:  Recent Labs     21  1055 21  0531 21  0555   WBC 5.7 6.0 5.9   RBC 2.71* 2.71* 2.55*   HGB 8.1* 8.0* 7.5*   HCT 24.4* 24.4* 23.2*   MCV 90.1 89.9 91.1   RDW 16.1* 15.7* 15.2    166 199     CHEMISTRIES:  Recent Labs     21  1055 21  0505 21  0555   * 135* 140   K 4.7 4.4 4.6    103 106   CO2 29 28 29   BUN 13 12 14   CREATININE 1.0 1.0 1.0   GLUCOSE 507* 335* 112*     PT/INR:  Recent Labs     21  0505   PROTIME 11.7   INR 1.01     APTT:No results for input(s): APTT in the last 72 hours. LIVER PROFILE:No results for input(s): AST, ALT, BILIDIR, BILITOT, ALKPHOS in the last 72 hours.     Imaging Last 24 Hours:  Ct Soft Tissue Neck W Contrast    Result Date: 2021  EXAMINATION: CT OF THE NECK SOFT TISSUE WITH CONTRAST  2/2/2021 TECHNIQUE: CT of the neck was performed with the administration of intravenous contrast. Multiplanar reformatted images are provided for review. Dose modulation, iterative reconstruction, and/or weight based adjustment of the mA/kV was utilized to reduce the radiation dose to as low as reasonably achievable. COMPARISON: None. HISTORY: ORDERING SYSTEM PROVIDED HISTORY: left jaw mass TECHNOLOGIST PROVIDED HISTORY: Reason for exam:->left jaw mass Reason for Exam: left upper mandibular mass x 1 yr-increased in size FINDINGS: PHARYNX/LARYNX:  The palatine tonsils are normal in appearance. The tongue is normal in appearance. The valleculae, epiglottis, aryepiglottic folds and pyriform sinuses appear unremarkable. The true and false vocal cords are normal in appearance. No mass or abscess is seen. SALIVARY GLANDS/THYROID:  The parotid and submandibular glands appear unremarkable. The thyroid gland appears unremarkable. LYMPH NODES:  No cervical or supraclavicular lymphadenopathy is seen. SOFT TISSUES:  1.9 cm transverse by 2.7 cm AP by 4.8 cm craniocaudal subcutaneous collection. There is this overlying skin thickening. This is superficial to the is superficial investing fascia. This compresses the parotid gland but does not invade or with extend deep to the superficial investing fascia of. This may represent a subcutaneous abscess other inflammatory process cannot be excluded. This has increased in size from the prior study where it measured 0.7 cm AP by 0.7 mm transverse BRAIN/ORBITS/SINUSES:  The visualized portion of the intracranial contents appear unremarkable. The visualized portion of the orbits, paranasal sinuses and mastoid air cells demonstrate no acute abnormality. LUNG APICES/SUPERIOR MEDIASTINUM:  No focal consolidation is seen within the visualized lung apices. No superior mediastinal lymphadenopathy or mass. The visualized portion of the trachea appears unremarkable.  BONES: No aggressive appearing lytic or blastic bony lesion. Abnormal subcutaneous collection with overlying skin thickening suggestive of abscess and associated cellulitis  in the subcutaneous tissues superficial to the parotid gland and the superficial investing fascia. No significant adenopathy     Ct Guided Needle Placement    Result Date: 2/3/2021  PROCEDURE: CT GUIDED BONE MARROW ASPIRATION AND CORE NEEDLE BONE BIOPSY OF THE  ILIAC BONE. MODERATE CONSCIOUS SEDATION 2/3/2021 HISTORY: ORDERING SYSTEM PROVIDED HISTORY: eval for MDS TECHNOLOGIST PROVIDED HISTORY: Aspirate and biopsy Reason for exam:->eval for MDS; ORDERING SYSTEM PROVIDED HISTORY: bone marrow bx TECHNOLOGIST PROVIDED HISTORY: Aspirate and biopsy Reason for exam:->bone marrow bx SEDATION: 0.5 mg versed was titrated intravenously for moderate sedation monitored under my direction. Total intraservice time of sedation was 15 minutes. The patient's vital signs were monitored throughout the procedure and recorded in the patient's medical record by the nurse. Estimated blood loss: Less than 5 mL. TECHNIQUE: Informed consent was obtained following a detailed explanation of the procedure including risks, benefits, and alternatives. Universal protocol was followed. Axial images were obtained through the iliac bones using CT guidance and a suitable skin site was prepped and draped in sterile fashion. Local anesthesia was achieved with lidocaine. An 11 gauge [place] bone marrow biopsy needle was advanced into the left iliac bone and approximately 12 mL of bone marrow aspirate was obtained. A single core biopsy specimen was obtained and the patient tolerated the procedure well. Dose modulation, iterative reconstruction, and/or weight based adjustment of the mA/kV was utilized to reduce the radiation dose to as low as reasonably achievable. 33 CT images, 215.7 mGy. Successful CT guided bone marrow aspiration and core biopsy of the right iliac bone. Denies hematochezia or melena and stool is heme-neg. C diff is neg. H/H dropped to 5/16, Fe 15%. EGD/duod Bx is neg. Colonoscopy incomplete due to poor prep. CTA abd/pelvis/chest unremarkable. Patient has fever and a Lt subauricular abscess/tumor, s/p I&D by ENT on 2/3/21 and on Abx's.     Plan:   1. Further management per treating team  2. Still needs colonoscopy at some point  3.  Will follow  Елена Daly MD       [place] [address]) [phone]    Electronically signed by Jian Alas MD on 2/4/21 at 10:22 AM EST

## 2021-02-04 NOTE — PROGRESS NOTES
Physician Progress Note      Cem Dempsey  [facility] #:                  [id number]  :                       1960  ADMIT DATE:       2021 3:56 PM  [address] [place] DATE:  RESPONDING  PROVIDER #:        Kristen Horvath MD          QUERY TEXT:    Patient admitted with weakness. Noted documentation of Acute Kidney Injury in   progress note filed 2/3. In order to support the diagnosis of SHARRON, please   include additional clinical indicators in your documentation. Or please   document if the diagnosis of SHARRON has been ruled out after further study    The medical record reflects the following:  Risk Factors: 63 y.o female w/ CKD, DM  Clinical Indicators: creatinine values available on record review: 20:   1.1; this admission  1.0, range for this admission 09-. Treatment: Serial labs,  monitoring, supportive care    Thank you,  Claudette Lecher RN CDS  [phone]    Defined by Kidney Disease Improving Global Outcomes (KDIGO) clinical practice   guideline for acute kidney injury:  -Increase in SCr by greater than or equal to 0.3 mg/dl within 48 hours; or  -Increase in SCr to greater than or equal to 1.5 times baseline, which is   known or presumed to have occurred within the prior 7 days; or  -Urine volume < 0.5ml/kg/h for 6 hours  Options provided:  -- Acute kidney injury evidenced by, Please document evidence as well as   baseline creatinine, if known. -- Acute kidney injury ruled out after study  -- Other - I will add my own diagnosis  -- Disagree - Not applicable / Not valid  -- Disagree - Clinically unable to determine / Unknown  -- Refer to Clinical Documentation Reviewer    PROVIDER RESPONSE TEXT:    This patient has an acute kidney injury as evidenced by increase in creatinine   and oliguria    Query created by:  Azra Robert on 2/3/2021 1:56 PM      Electronically signed by:  Kristen Horvath MD 2021 12:59 PM

## 2021-02-04 NOTE — PROGRESS NOTES
Occupational Therapy  Facility/Department: [facility] C5 - MED SURG/ORTHO  Daily Treatment Note  NAME: Mike Asher  : 1960  MRN: [id number]    Date of Service: 2021    Discharge Recommendations:  Subacute/Skilled Nursing Facility     Assessment   Performance deficits / Impairments: Decreased functional mobility ; Decreased ADL status; Decreased safe awareness;Decreased balance;Decreased endurance;Decreased coordination;Decreased high-level IADLs;Decreased strength  Assessment: Pt continues to function below baseline for ADLs and mobility. SBA with RW for transfers and standing balance. Recommend SNF prior to d/c home alone. Cont skilled OT in acute care. Treatment Diagnosis: deconditioning  Prognosis: Good  OT Education: OT Role;Plan of Care;Transfer Training;ADL Adaptive Strategies  Patient Education: disease specific ed:  role of OT, safe transfer technique, standing balance during ADLs-pt verbalized understanding  REQUIRES OT FOLLOW UP: Yes  Activity Tolerance  Activity Tolerance: Patient Tolerated treatment well  Safety Devices  Type of devices: Nurse notified; Chair alarm in place; Left in chair;Call light within reach;Gait belt       Patient Diagnosis(es): The primary encounter diagnosis was Acute cystitis with hematuria. Diagnoses of Frequent falls, Closed head injury, initial encounter, and Generalized weakness were also pertinent to this visit.       has a past medical history of Abnormal brain MRI, Acute bilateral low back pain without sciatica, SHARRON (acute kidney injury) ([address] Arthritis, Bipolar disorder ([address] CAD (coronary artery disease), Cancer ([address] Carotid stenosis, bilateral:<50%:per US 2016, Carpal tunnel syndrome, Cervical cancer screening, Coronary artery disease of native artery of native heart with stable angina pectoris ([address] DDD (degenerative disc disease), lumbar, Depression, Depression/anxiety, Depression/anxiety, Diabetes mellitus ([address] Gout, History of mammogram, Hyperlipidemia, Hypertension, 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 has a past surgical history that includes Kidney removal; Hysterectomy; Breast lumpectomy (2015); Tubal ligation; other surgical history (Right); Cardiac catheterization (06/08/2020); Upper gastrointestinal endoscopy (N/A, 1/29/2021); Colonoscopy (N/A, 2/1/2021); and CT BIOPSY BONE MARROW (2/3/2021). Restrictions  Restrictions/Precautions  Restrictions/Precautions: Fall Risk, General Precautions, Up as Tolerated  Position Activity Restriction  Other position/activity restrictions: up with assist  Subjective   General  Chart Reviewed: Yes  Patient assessed for rehabilitation services?: Yes  Family / Caregiver Present: No  Referring Practitioner: GRADY Black  Diagnosis: frequent falls  Subjective  Subjective: Pt agreeable to OT, seated in chair upon approach. General Comment  Comments: RN approved therapy  Vital Signs  Patient Currently in Pain: Denies   Orientation  Orientation  Overall Orientation Status: Within Functional Limits  Objective    ADL  Feeding: Independent  Grooming: Stand by assistance(wash hands and brush teeth at sink)  LE Dressing: Stand by assistance(socks)  Toileting: Stand by assistance     Balance  Sitting Balance: Supervision  Standing Balance: Stand by assistance(rw)  Standing Balance  Time: x7 min total  Activity: No LOB with ADL activity with use of RW.   Toilet Transfers  Toilet - Technique: Ambulating(rw)  Equipment Used: Grab bars  Toilet Transfer: Stand by assistance     Transfers  Stand Pivot Transfers: Stand by assistance(rw)  Sit to stand: Stand by assistance  Stand to sit: Stand by assistance         Cognition  Overall Cognitive Status: WFL         Type of ROM/Therapeutic Exercise  Type of ROM/Therapeutic Exercise: AROM  Comment: seated  Exercises  Shoulder Elevation: 15x  Shoulder Flexion: 15x  Horizontal ABduction: 15x  Horizontal ADduction: 15x  Elbow Flexion: 15x  Elbow Extension: 15x  Grasp/Release: 15x       Plan   Plan  Times per week: 3-5x  Current Treatment Recommendations: Self-Care / ADL, Functional Mobility Training, Balance Training, Endurance Training, Equipment Evaluation, Education, & procurement, Safety Education & Training, Patient/Caregiver Education & Training, Strengthening, ROM, Home Management Training  AM-PAC Score   AM-[facility] Inpatient Daily Activity Raw Score: 19 (02/04/21 1609)  AM-PAC Inpatient ADL T-Scale Score : 40.22 (02/04/21 1609)  ADL Inpatient CMS 0-100% Score: 42.8 (02/04/21 1609)  ADL Inpatient CMS G-Code Modifier : CK (02/04/21 1609)  Goals  Short term goals  Time Frame for Short term goals: 1 week (2/3) unless noted - continue all goals 2/5  Short term goal 1: Perform functional transfers with supervision and RW-ongoing, SBA 2/4  Short term goal 2: Perform bathroom mobility with supervision and RW-ongoing, SBA 2/4  Short term goal 3: Perform LE dressing with supervision-ongoing, 2/4  Short term goal 4: Perform UE exer 15x each to improve endurance by 1/30-goal met 2/4  Patient Goals   Patient goals : \"Be able to walk better\"     Therapy Time   Individual Concurrent Group Co-treatment   Time In 1300         Time Out 1323         Minutes 23         Timed Code Treatment Minutes: 23 Minutes    If pt is discharged prior to next OT session, this note will serve as the discharge summary.   Reyna Ricardo OT

## 2021-02-04 NOTE — PROGRESS NOTES
[facility]    [facility]              [phone]                      She is admitted with falls, weakness, and hyperglycemia We are following for CKD and related issues    Interval History and plan:      Has abscess in the neck- drained  Also has anemia, BM done yesterday  Has SHARRON  On antibiotics  Cr higher than baseline- but stable  SHARRON likely due to relative hypotension  Decreased lisinopril dose                    Assessment :     CKD Stage II with SHARRON  Due to nephrectomy for Wilms' tumor  Creatinine is 0.9  Her creatinine is normal due to compensatory hypertrophy of the contralateral  kidney  UA- glucose, trace blood, +ve nitrites, on abx now    Pr/cr ratio- normal      Hypertension   BP: (104-110)/(57-65)  Pulse:  [63-65]   BP goal inpatient 543-119 systolic inpatient  BP is stable  Has Afib     Anemia    Hemoglobin low  Seen by GI   Work up pending  EGD-normal    Frequent falls  Debility  DM2- uncontrolled   CAP- sp PCI  Ca Breast    [address] [facility] would like to thank Tita Hendrickson MD   for opportunity to serve this patient      Please call with questions at-   24 Hrs Answering service [phone] or  7 am- 5 pm via Perfect serve or cell phone  Dr.Sudhir Lauren Estrada          CC/reason for consult :     CKD     HPI :     Rosario Tucker is a 63 y.o. female presented to   the hospital on 1/26/2021 with fatigue, and frequent  Falls. She has difficulty walking, and has fallen multiple  Times a home. No fever, chills,sob, no chest pain, nausea,  Vomiting or diarrhea. No LOC, dizziness. Also has right foot pain. Has significantly wt loss recently  She came to the ED and being worked up for falls and   Weakness. She is known to have Wilms' tumor, and has nephrectomy as a child. We are consulted for CKD, and also possibility of placing PICC line.     ROS:     Seen with- no family    positives in bold   Constitutional:  fever, chills, weakness, weight change, fatigue  Skin: rash, pruritus, hair loss, bruising, dry skin, petechiae  Head, Face, Neck   headaches, swelling,  cervical adenopathy  Respiratory: shortness of breath, cough, or wheezing  Cardiovascular: chest pain, palpitations, dizzy, edema  Gastrointestinal: nausea, vomiting, diarrhea, constipation,belly pain    Yellow skin, blood in stool  Musculoskeletal:  back pain, muscle weakness, gait problems,       joint pain or swelling. Genitourinary:  dysuria, poor urine flow, flank pain, blood in urine  Neurologic:  vertigo, TIA'S, syncope, seizures, focal weakness  Psychosocial:  insomnia, anxiety, or depression. Additional positive findings: weakness as above               All other remaining systems are negative or unable to obtain        PMH/PSH/SH/Family History:     Past Medical History:   Diagnosis Date    Abnormal brain MRI 7/20/2017    Partially empty sella and minimal chronic small vessel ischemic disease    Acute bilateral low back pain without sciatica 11/2/2016    SHARRON (acute kidney injury) ([address] 7/5/2017    Arthritis     back    Bipolar disorder ([address] 10/18/2008    CAD (coronary artery disease)     stent placed 6/8/20    Cancer [facility]) 2015    bilateral breast:s/p lumpectomy/radiation:under care care of breast specialist:Dr. Boone     Carotid stenosis, bilateral:<50%:per US 7/2016 7/15/2016    Carpal tunnel syndrome 10/18/2008    Cervical cancer screening 2014    Nml per pt'.     Coronary artery disease of native artery of native heart with stable angina pectoris ([address] 6/9/2020    DDD (degenerative disc disease), lumbar 7/18/2018    Depression     under care of pschiatrist:Dr. 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

## 2021-02-04 NOTE — PROGRESS NOTES
Infectious Disease Follow up Notes    CC :  Fever, facial abscess     Antibiotics:   vanc 500 q12  culturelle    Admit Date:   1/26/2021  Hospital Day: 10    Subjective:   No recurrence of fever in the last 24 hours. She is feeling better today. Seems to be tolerating the abx without N/V/D/pruritus    Objective:     Patient Vitals for the past 8 hrs:   BP Temp Temp src Pulse Resp SpO2   02/04/21 1327 (!) 94/57 -- -- 76 -- 100 %   02/04/21 1201 99/66 98.3 °F (36.8 °C) Oral 54 16 100 %   02/04/21 0825 110/65 98.1 °F (36.7 °C) Oral 65 16 98 %       EXAM:  General:  Alert, oriented, NAD  Decreased fluctuance L facial abscess, no active purulent drainage.   +Tender  No LAD   Abd soft, flat, NT   IV site ok          Scheduled Meds:   mupirocin   Topical BID    lactobacillus  2 capsule Oral BID WC    lisinopril  5 mg Oral Daily    insulin glargine  30 Units Subcutaneous QAM    vancomycin  500 mg Intravenous Q12H    insulin lispro  0-18 Units Subcutaneous TID WC    insulin lispro  0-9 Units Subcutaneous Nightly    lidocaine 1 % injection  5 mL Intradermal Once    sodium chloride flush  10 mL Intravenous 2 times per day    paliperidone  6 mg Oral QAM    aspirin  81 mg Oral Daily    cetirizine  10 mg Oral Daily    fluticasone  2 spray Each Nostril Daily    gabapentin  600 mg Oral TID    pantoprazole  40 mg Oral QAM AC    atorvastatin  20 mg Oral Daily    [Held by provider] ticagrelor  90 mg Oral BID    sodium chloride flush  10 mL Intravenous 2 times per day    [Held by provider] enoxaparin  40 mg Subcutaneous Daily       Continuous Infusions:   sodium chloride      dextrose 5 % and 0.45 % NaCl 20 mL/hr at 02/04/21 0844    dextrose            Data Review:    Lab Results   Component Value Date    WBC 5.9 02/04/2021    HGB 7.5 (L) 02/04/2021    HCT 23.2 (L) 02/04/2021    MCV 91.1 02/04/2021     02/04/2021     Lab Results   Component Value Date    CREATININE 1.0 02/04/2021    BUN 14 02/04/2021     02/04/2021    K 4.6 02/04/2021     02/04/2021    CO2 29 02/04/2021       Hepatic Function Panel:   Lab Results   Component Value Date    ALKPHOS 218 01/29/2021    ALT 44 01/29/2021    AST 23 01/29/2021    PROT 5.8 02/01/2021    PROT 8.1 01/05/2013    BILITOT 0.4 01/29/2021    BILIDIR <0.2 07/22/2016    IBILI see below 07/22/2016    LABALBU 2.4 02/01/2021       Cultures:   1/26     UC Escherichia coli           Antibiotic Interpretation ALFREDO Status     ampicillin Sensitive <=2 mcg/mL       ceFAZolin Sensitive <=4 mcg/mL         NOTE: Cefazolin should only be used for uncomplicated UTI         for E.coli or Klebsiella pneumoniae.    cefepime Sensitive <=0.12 mcg/mL       cefTRIAXone Sensitive <=0.25 mcg/mL       ciprofloxacin Sensitive <=0.25 mcg/mL       ertapenem Sensitive <=0.12 mcg/mL       gentamicin Sensitive <=1 mcg/mL       levofloxacin Sensitive <=0.12 mcg/mL       nitrofurantoin Sensitive <=16 mcg/mL       piperacillin-tazobactam Sensitive <=4 mcg/mL       trimethoprim-sulfamethoxazole Sensitive <=20 mcg/mL          1/26     UA _yeast, 6-9wbc, neg LE, +nitrite  1/27     C diff neg   1/28     COVID NAAAT/RT PCR neg   2/1       UC 75,000cfu each Enterococcus  faecalis           Antibiotic Interpretation ALFREDO Status     ampicillin Sensitive <=2 mcg/mL       nitrofurantoin Sensitive <=16 mcg/mL       tetracycline Resistant >=16 mcg/mL       vancomycin Sensitive 1 mcg/mL       Staphylococcus epidermidis           Antibiotic Interpretation ALFREDO Status     nitrofurantoin Sensitive <=16 mcg/mL       oxacillin Resistant >=4 mcg/mL       tetracycline Resistant >=16 mcg/mL       trimethoprim-sulfamethoxazole Resistant 160 mcg/mL       vancomycin Sensitive 2 mcg/mL          2/1       BC x2 NGTD               HIV screen neg   2/3 Facial abscess culture NGTD, GS 1+ GPC         Radiology Review:  All pertinent images / reports were reviewed as a part of this visit.    CT neck 2/2/21   Impression   Abnormal subcutaneous collection with overlying skin thickening suggestive of   abscess and associated cellulitis  in the subcutaneous tissues superficial to   the parotid gland and the superficial investing fascia.  No significant   adenopathy      CT chest 2/1/21  Impression   Right upper extremity PICC line has changed in position as compared to recent   radiograph, with tip no longer in the superior vena cava.  The tip of the   catheter now appears closely associated with the anterior wall of the left   brachiocephalic vein.  Recommend repositioning.       Small bilateral pleural effusions.  Bibasilar opacity likely reflects   atelectasis in the absence of clinical signs or symptoms of pneumonitis.       Atherosclerosis, including coronary artery calcification.       No significant interval change in 3 mm right upper lobe pulmonary nodule as   compared to prior.       Soft tissue anasarca.          CTA a/p 2/1/21  Impression   No definitive evidence of active arterial extravasation.  No hematoma.  Close   follow-up recommended.       No evidence of acute process in the abdomen/pelvis, as discussed.       Progression of the patient's chronic calcific pancreatitis is slightly   increased pancreatic dilation since July 2019.      CXR 2/1/21  Impression   Mild right basilar airspace disease, atelectasis versus pneumonia.      CT head 1/26/21  NAICA, R maxillary sinusitis    Assessment:     Patient Active Problem List    Diagnosis Date Noted    Unintentional weight loss 11/05/2020    DKA (diabetic ketoacidoses) ([facility] 10/07/2020    DKA, type 2, not at goal [facility]) 10/07/2020    Hx of heart artery stent 06/19/2020    CAD (coronary artery disease) 06/09/2020    Chest pain 06/05/2020    Hypomagnesemia 01/23/2020    Acute cystitis without hematuria 01/22/2020    History of CVA (cerebrovascular accident) without residual deficits 07/08/2019 Last Assessment & Plan:   Condition: stable    Take pain medication as prescribed. Practice non-pharmacological pain reduction techniques/interventions such as, deep breathing exercises, massage, gel packs, if indicated to be safe by PCP. Monitor for worsening of back pain in combination with other signs and symptoms of worsening disease and see PCP as soon as possible. Follow up in: one year      SHARRON (acute kidney injury) ([facility] 07/05/2017    Lactic acidosis 07/05/2017    Frequent falls 07/05/2017    Depression/anxiety 07/05/2017    SOBOE (shortness of breath on exertion) 05/05/2017    History of breast cancer 07/20/2016    History of renal cell carcinoma 07/20/2016     Overview Note:     Overview:   S/p nephrectomy      Gallstone pancreatitis 07/18/2016    Carotid stenosis, bilateral:<50%:per US 7/2016 07/15/2016    Hx of ischemic CVA     Brain metastases ([facility]     Slurred speech 07/10/2016    Microalbuminuria 07/01/2016    Obesity (BMI 30-39.9) 07/01/2016    Essential hypertension 06/17/2016    Bilateral malignant neoplasm of breast in female [facility]) 06/17/2016    Patient in clinical research study 09/11/2015    Nuclear senile cataract 12/22/2014    Diffuse cystic mastopathy 09/22/2014     Overview Note:     Overview:   History of  right  Breast biopsy  Date: age 11 and 28's. Pathology:  Benign. Patient is G 3  P 1  with  Family History of breast cancer. Mother and sister age 52's. Menarche at age 14   And first birth at age of 24    She is post menopausal    and without  Hormonal replacement. She is status post RADHIKA/BSO. With fibroid  Age 41's. Last Assessment & Plan:   History of  right  Breast biopsy  Date: age 11 and 28's. Pathology:  Benign. Patient is G 3  P 1  with  Family History of breast cancer. Mother and sister age 52's. Menarche at age 14   And first birth at age of 24    She is post menopausal    and without  Hormonal replacement.    She is status post RADHIKA/BSO. With fibroid  Age 41's.  Cardiomegaly 03/28/2013    Cardiomyopathy ([facility] 03/28/2013     Overview Note:     Overview:   EF 45%    Overview:   EF 45%      Chronic systolic heart failure ([facility] 03/28/2013    Edema 03/28/2013    Nonspecific abnormal electrocardiogram (ECG) (EKG) 03/28/2013    Syncope and collapse 03/28/2013    Cardiomyopathy in other diseases classified elsewhere 05/10/2011    Bipolar disorder ([facility] 10/18/2008     Overview Note:     Overview:   ICD-10 Transition    Overview:   ICD-10 Transition      Carpal tunnel syndrome 10/18/2008    Gout 10/18/2008     Overview Note:     Overview:   ICD-10 Transition      Mononeuritis 10/18/2008     Overview Note:     Overview:   ICD-10 Transition      Myalgia and myositis 10/18/2008     Overview Note:     Overview:   ICD-10 Transition      Peroneal muscular atrophy 10/18/2008    Scoliosis (and kyphoscoliosis), idiopathic 10/18/2008       Facial abscess s/p bedside I&D  GPC seen on stain, culture pending  -abx changed to vanc, pharmacy is dosing  -GPC seen on Gram's stain. If the culture remains negative, will plan to change to po clinda 300 QID and continue x5 days more      Mixed bacteriuria without symptoms of cystitis, consistent with ASB. -no directed abx indicated     Fever on 2/1/21  Suspect related to facial abscess  Fever has not   -monitor   Anemia  S/p EGD, colon, BM bx     Unintentional weight loss  HIV screen negative.   TSH wnl   May be related to cause of anemia   Workup ongoing       Discussed with patient/family, all questions answered        More Aguilera MD  Phone: [phone]   Fax : [phone]

## 2021-02-04 NOTE — CARE COORDINATION
Pt accepted by [facility] as long as 30 day supply of Invega and Brilinta. MD will write script and send down prior to d/c.  OP Rx aware. Pt will need rapid Covid day of d/c. Pt had bone marrow on 02/03. CM will continue to follow for any additional needs.   Kay Bradshaw RN

## 2021-02-05 VITALS
DIASTOLIC BLOOD PRESSURE: 65 MMHG | HEART RATE: 66 BPM | BODY MASS INDEX: 20.54 KG/M2 | OXYGEN SATURATION: 100 % | HEIGHT: 63 IN | TEMPERATURE: 97.7 F | RESPIRATION RATE: 16 BRPM | WEIGHT: 115.9 LBS | SYSTOLIC BLOOD PRESSURE: 125 MMHG

## 2021-02-05 PROBLEM — D64.9 ANEMIA: Status: ACTIVE | Noted: 2021-02-05

## 2021-02-05 PROBLEM — E11.9 TYPE 2 DIABETES MELLITUS (HCC): Status: ACTIVE | Noted: 2020-10-07

## 2021-02-05 PROBLEM — L02.01 FACIAL ABSCESS: Status: ACTIVE | Noted: 2021-02-05

## 2021-02-05 PROBLEM — R82.71 ASYMPTOMATIC BACTERIURIA: Status: ACTIVE | Noted: 2021-02-05

## 2021-02-05 LAB
ANION GAP SERPL CALCULATED.3IONS-SCNC: 4 MMOL/L (ref 3–16)
BANDED NEUTROPHILS RELATIVE PERCENT: 2 % (ref 0–7)
BASOPHILS ABSOLUTE: 0 K/UL (ref 0–0.2)
BASOPHILS RELATIVE PERCENT: 0 %
BLOOD CULTURE, ROUTINE: NORMAL
BUN BLDV-MCNC: 15 MG/DL (ref 7–20)
CALCIUM SERPL-MCNC: 8.1 MG/DL (ref 8.3–10.6)
CHLORIDE BLD-SCNC: 109 MMOL/L (ref 99–110)
CO2: 28 MMOL/L (ref 21–32)
CREAT SERPL-MCNC: 1.1 MG/DL (ref 0.6–1.2)
CULTURE, BLOOD 2: NORMAL
EOSINOPHILS ABSOLUTE: 0 K/UL (ref 0–0.6)
EOSINOPHILS RELATIVE PERCENT: 0 %
GFR AFRICAN AMERICAN: >60
GFR NON-AFRICAN AMERICAN: 50
GLUCOSE BLD-MCNC: 217 MG/DL (ref 70–99)
GLUCOSE BLD-MCNC: 275 MG/DL (ref 70–99)
GLUCOSE BLD-MCNC: 76 MG/DL (ref 70–99)
HCT VFR BLD CALC: 22.5 % (ref 36–48)
HEMOGLOBIN: 7.4 G/DL (ref 12–16)
LYMPHOCYTES ABSOLUTE: 2.2 K/UL (ref 1–5.1)
LYMPHOCYTES RELATIVE PERCENT: 44 %
MACROCYTES: ABNORMAL
MCH RBC QN AUTO: 30.2 PG (ref 26–34)
MCHC RBC AUTO-ENTMCNC: 32.9 G/DL (ref 31–36)
MCV RBC AUTO: 91.8 FL (ref 80–100)
MONOCYTES ABSOLUTE: 0.4 K/UL (ref 0–1.3)
MONOCYTES RELATIVE PERCENT: 8 %
NEUTROPHILS ABSOLUTE: 2.4 K/UL (ref 1.7–7.7)
NEUTROPHILS RELATIVE PERCENT: 46 %
OVALOCYTES: ABNORMAL
PDW BLD-RTO: 15.3 % (ref 12.4–15.4)
PERFORMED ON: ABNORMAL
PERFORMED ON: NORMAL
PLATELET # BLD: 204 K/UL (ref 135–450)
PLATELET SLIDE REVIEW: ADEQUATE
PMV BLD AUTO: 10.7 FL (ref 5–10.5)
POTASSIUM SERPL-SCNC: 4.4 MMOL/L (ref 3.5–5.1)
RBC # BLD: 2.45 M/UL (ref 4–5.2)
SARS-COV-2, NAAT: NOT DETECTED
SLIDE REVIEW: ABNORMAL
SODIUM BLD-SCNC: 141 MMOL/L (ref 136–145)
VANCOMYCIN TROUGH: 7.7 UG/ML (ref 10–20)
WBC # BLD: 5 K/UL (ref 4–11)

## 2021-02-05 PROCEDURE — 6370000000 HC RX 637 (ALT 250 FOR IP): Performed by: INTERNAL MEDICINE

## 2021-02-05 PROCEDURE — 80048 BASIC METABOLIC PNL TOTAL CA: CPT

## 2021-02-05 PROCEDURE — 97530 THERAPEUTIC ACTIVITIES: CPT

## 2021-02-05 PROCEDURE — 2580000003 HC RX 258: Performed by: INTERNAL MEDICINE

## 2021-02-05 PROCEDURE — 6370000000 HC RX 637 (ALT 250 FOR IP): Performed by: NURSE PRACTITIONER

## 2021-02-05 PROCEDURE — 80202 ASSAY OF VANCOMYCIN: CPT

## 2021-02-05 PROCEDURE — 2580000003 HC RX 258: Performed by: NURSE PRACTITIONER

## 2021-02-05 PROCEDURE — 97110 THERAPEUTIC EXERCISES: CPT

## 2021-02-05 PROCEDURE — 97116 GAIT TRAINING THERAPY: CPT

## 2021-02-05 PROCEDURE — 85025 COMPLETE CBC W/AUTO DIFF WBC: CPT

## 2021-02-05 PROCEDURE — 6360000002 HC RX W HCPCS: Performed by: INTERNAL MEDICINE

## 2021-02-05 PROCEDURE — U0002 COVID-19 LAB TEST NON-CDC: HCPCS

## 2021-02-05 RX ORDER — OXYCODONE HYDROCHLORIDE AND ACETAMINOPHEN 5; 325 MG/1; MG/1
1 TABLET ORAL EVERY 6 HOURS PRN
Qty: 10 TABLET | Refills: 0 | Status: SHIPPED | OUTPATIENT
Start: 2021-02-05 | End: 2021-02-08

## 2021-02-05 RX ORDER — CLINDAMYCIN HYDROCHLORIDE 300 MG/1
300 CAPSULE ORAL 4 TIMES DAILY
Qty: 20 CAPSULE | Refills: 0
Start: 2021-02-05 | End: 2021-02-10

## 2021-02-05 RX ORDER — LACTOBACILLUS RHAMNOSUS GG 10B CELL
2 CAPSULE ORAL 2 TIMES DAILY WITH MEALS
Qty: 30 CAPSULE | Refills: 0
Start: 2021-02-05 | End: 2021-03-09

## 2021-02-05 RX ORDER — CLINDAMYCIN HYDROCHLORIDE 150 MG/1
300 CAPSULE ORAL EVERY 6 HOURS SCHEDULED
Status: DISCONTINUED | OUTPATIENT
Start: 2021-02-05 | End: 2021-02-05 | Stop reason: HOSPADM

## 2021-02-05 RX ADMIN — LISINOPRIL 5 MG: 5 TABLET ORAL at 08:18

## 2021-02-05 RX ADMIN — INSULIN LISPRO 6 UNITS: 100 INJECTION, SOLUTION INTRAVENOUS; SUBCUTANEOUS at 08:26

## 2021-02-05 RX ADMIN — Medication 10 ML: at 08:21

## 2021-02-05 RX ADMIN — INSULIN GLARGINE 30 UNITS: 100 INJECTION, SOLUTION SUBCUTANEOUS at 08:26

## 2021-02-05 RX ADMIN — CLINDAMYCIN HYDROCHLORIDE 300 MG: 150 CAPSULE ORAL at 12:08

## 2021-02-05 RX ADMIN — PANTOPRAZOLE SODIUM 40 MG: 40 TABLET, DELAYED RELEASE ORAL at 06:10

## 2021-02-05 RX ADMIN — OXYCODONE AND ACETAMINOPHEN 1 TABLET: 5; 325 TABLET ORAL at 08:25

## 2021-02-05 RX ADMIN — SODIUM CHLORIDE, PRESERVATIVE FREE 10 ML: 5 INJECTION INTRAVENOUS at 08:21

## 2021-02-05 RX ADMIN — GABAPENTIN 600 MG: 300 CAPSULE ORAL at 08:18

## 2021-02-05 RX ADMIN — VANCOMYCIN HYDROCHLORIDE 500 MG: 500 INJECTION, POWDER, LYOPHILIZED, FOR SOLUTION INTRAVENOUS at 06:10

## 2021-02-05 RX ADMIN — PALIPERIDONE 6 MG: 3 TABLET, EXTENDED RELEASE ORAL at 08:26

## 2021-02-05 RX ADMIN — CETIRIZINE HYDROCHLORIDE 10 MG: 10 TABLET, FILM COATED ORAL at 08:19

## 2021-02-05 RX ADMIN — FLUTICASONE PROPIONATE 2 SPRAY: 50 SPRAY, METERED NASAL at 08:19

## 2021-02-05 RX ADMIN — Medication 2 CAPSULE: at 08:18

## 2021-02-05 RX ADMIN — MUPIROCIN: 20 OINTMENT TOPICAL at 08:21

## 2021-02-05 RX ADMIN — GABAPENTIN 600 MG: 300 CAPSULE ORAL at 14:05

## 2021-02-05 RX ADMIN — ASPIRIN 81 MG: 81 TABLET, CHEWABLE ORAL at 08:19

## 2021-02-05 RX ADMIN — ATORVASTATIN CALCIUM 20 MG: 10 TABLET, FILM COATED ORAL at 08:19

## 2021-02-05 NOTE — CARE COORDINATION
CASE MANAGEMENT DISCHARGE SUMMARY      Discharge to: [facility] Exemption Notification (HENS) completed: yes    Transportation: ambulance  Agency used: [employer]   Ambulance form completed: Yes    Confirmed discharge plan with:     Patient: yes     Facility/Agency, name:  [facility] faxed   Phone number for report to facility: [phone]     RN, name: Brian Valdes RN    Note: Discharging nurse to complete CI[facility]NO, reconcile [facility], and place final copy with patient's discharge packet. RN to ensure that written prescriptions for  Level II medications are sent with patient to the facility as per protocol. Pt will need to d/c w/meds filled. RN aware.   Davina Lehman RN

## 2021-02-05 NOTE — DISCHARGE INSTR - COC
Continuity of Care Form    Patient Name: Jeni Dempsey   :  1960  MRN:  [id number]    Admit date:  2021  Discharge date:  2021    Code Status Order: Full Code   Advance Directives:   Advance Care Flowsheet Documentation       Date/Time Healthcare Directive Type of Healthcare Directive Copy in [address] Agent's Name Healthcare Agent's Phone Number    21 [phone]  No, patient does not have an advance directive for healthcare treatment -- -- -- -- --            Admitting Physician:  Sujatha Yeh MD  PCP: Camillo Bence    Discharging Nurse: [facility] Unit/Room#: 7353/8646-13  Discharging Unit Phone Number: [phone]    Emergency Contact:   Extended Emergency Contact Information  Primary Emergency Contact: [address] Phone: [phone]  Work Phone: [phone]  Mobile Phone: [phone]  Relation: Child  Secondary Emergency Contact: Zenaida Oshea [facility] Phone: [phone]  Mobile Phone: [phone]  Relation: Other    Past Surgical History:  Past Surgical History:   Procedure Laterality Date    BREAST LUMPECTOMY      Bilateral:breast cancer    CARDIAC CATHETERIZATION  2020    Dr. Allison Steele), DAYANA to Diag 1    COLONOSCOPY N/A 2021    COLONOSCOPY DIAGNOSTIC performed by Miri Nettles MD at [place] [address]  2/3/2021    CT BONE MARROW BIOPSY 2/3/2021 Sandra Hammonds MD [facility] CT SCAN    HYSTERECTOMY      Benign:no cervical cancer per pt'    KIDNEY REMOVAL      right    OTHER SURGICAL HISTORY Right     orif right ankle    TUBAL LIGATION      UPPER GASTROINTESTINAL ENDOSCOPY N/A 2021    EGD BIOPSY performed by Miri Nettles MD at [address] [place] [address]       Immunization History:   Immunization History   Administered Date(s) Administered    Influenza, MDCK Quadv, IM, PF (Flucelvax 4 yrs and older) 2017    Influenza, Quadv, IM, PF (6 mo and older Fluzone, Flulaval, Fluarix, and 3 yrs and older Afluria) 10/11/2016, 01/23/2020    Tdap (Boostrix, Adacel) 10/07/2020       Active Problems:  Patient Active Problem List   Diagnosis Code    Essential hypertension I10    Bilateral malignant neoplasm of breast in female ([facility] C50.911, C50.912    Microalbuminuria R80.9    Obesity (BMI 30-39. 9) E66.9    Slurred speech R47.81    Hx of ischemic CVA I63.40    Brain metastases ([facility]) C79.31    Carotid stenosis, bilateral:<50%:per US 7/2016 I65.23    SHARRON (acute kidney injury) ([facility]) N17.9    Lactic acidosis E87.2    Frequent falls R29.6    Depression/anxiety F41.8    Abnormal brain MRI R90.89    Acute bilateral low back pain without sciatica M54.5    Uncontrolled type 2 diabetes mellitus with microalbuminuria, with long-term current use of insulin ([facility]) E11.29, E11.65, R80.9, Z79.4    Closed fracture of right ankle, with routine healing, subsequent encounter S82.891D    CKD (chronic kidney disease), stage III N18.30    Uncontrolled type 2 diabetes mellitus with diabetic nephropathy, with long-term current use of insulin ([facility]) E11.21, E11.65, Z79.4    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 ([facility] E11.59    Dyslipidemia associated with type 2 diabetes mellitus ([facility] E11.69, E78.5    Bipolar disorder ([facility]) F31.9    Cardiomegaly I51.7    Cardiomyopathy ([facility] I42.9    Carpal tunnel syndrome G56.00    Chronic systolic heart failure ([facility]) I50.22    Diffuse cystic mastopathy N60.19    Edema R60.9    Gallstone pancreatitis K85.10    Gout M10.9    History of breast cancer Z85.3    History of renal cell carcinoma Z85.528    Cardiomyopathy in other diseases classified elsewhere I43    Mononeuritis G58.9    Myalgia and myositis UDF6320    Nonspecific abnormal electrocardiogram (ECG) (EKG) R94.31    Patient in clinical research study Z00.6    Peroneal muscular atrophy G60.0    Scoliosis (and kyphoscoliosis), idiopathic M41.20    SOBOE Pulse 81   Temp 97.7 °F (36.5 °C) (Axillary)   Resp 16   Ht 5' 3\" (1.6 m)   Wt 115 lb 14.4 oz (52.6 kg)   SpO2 96%   BMI 20.53 kg/m²     Last documented pain score (0-10 scale): Pain Level: 5  Last Weight:   Wt Readings from Last 1 Encounters:   01/26/21 115 lb 14.4 oz (52.6 kg)     Mental Status:  oriented and alert    IV Access:  - None    Nursing Mobility/ADLs:  Walking   Independent  Transfer  Independent  Bathing  Independent  Dressing  Assisted  Toileting  Independent  Feeding  [address]  Independent  Med Delivery   whole    Wound Care Documentation and Therapy:        Elimination:  Continence:   · Bowel: Yes  · Bladder: Yes  Urinary Catheter: None   Colostomy/Ileostomy/Ileal Conduit: No       Date of Last BM: 02/04/2021    Intake/Output Summary (Last 24 hours) at 2/5/2021 0907  Last data filed at 2/4/2021 1330  Gross per 24 hour   Intake 650 ml   Output --   Net 650 ml     I/O last 3 completed shifts: In: 0 [P.O.:650]  Out: -     Safety Concerns: At Risk for Falls    Impairments/Disabilities:      None    Nutrition Therapy:  Current Nutrition Therapy:   General diet     Routes of Feeding: Oral  Liquids: No Restrictions  Daily Fluid Restriction: no  Last Modified Barium Swallow with Video (Video Swallowing Test): not done    Treatments at the Time of Hospital Discharge:   Respiratory Treatments: ***  Oxygen Therapy:  is not on home oxygen therapy.   Ventilator:    - No ventilator support    Rehab Therapies: Physical Therapy and Occupational Therapy  Weight Bearing Status/Restrictions: No weight bearing restirctions  Other Medical Equipment (for information only, NOT a DME order):  ***  Other Treatments: ***    Patient's personal belongings (please select all that are sent with patient):  None    RN SIGNATURE:  Electronically signed by Adan Lewis RN on 2/5/21 at 1:59 PM EST    CASE MANAGEMENT/SOCIAL WORK SECTION    Inpatient Status Date: ***    Readmission Risk Assessment Score:  Readmission Risk              Risk of Unplanned Readmission:        46           Discharging to Facility/ [address] [facility]   Skilled Nursing   [address] [facility] [address]   [phone]     / signature: Electronically signed by Augustine Hernandez RN on 2/5/21 at 1:19 PM EST    PHYSICIAN SECTION    Prognosis: Good    Condition at Discharge: Stable    Rehab Potential (if transferring to Rehab): Fair    Recommended Labs or Other Treatments After Discharge: Follow up with hematology in 1-2 weeks   Remove wound packing on 2/6/2021. Continue wound care daily     Physician Certification: I certify the above information and transfer of Marcia Gan  is necessary for the continuing treatment of the diagnosis listed and that she requires [place] for less 30 days.      Update Admission H&P: No change in H&P    PHYSICIAN SIGNATURE:  Electronically signed by Margot Ordonez MD on 2/5/21 at 11:46 AM EST

## 2021-02-05 NOTE — PROGRESS NOTES
Called in report to [facility] at [address] RN left phone number for call back with any questions.

## 2021-02-05 NOTE — PROGRESS NOTES
Progress Note  EUUB:[id number]       Room:31/0531-01  Patient Name:Agustina Cooley     YOB: 1960     Age:60 y.o. Subjective    Subjective:  Symptoms:  Stable. Pain:  She reports no pain. Review of Systems  Objective         Vitals Last 24 Hours:  TEMPERATURE:  Temp  Av.7 °F (36.5 °C)  Min: 97.7 °F (36.5 °C)  Max: 97.7 °F (36.5 °C)  RESPIRATIONS RANGE: Resp  Av  Min: 16  Max: 16  PULSE OXIMETRY RANGE: SpO2  Av.7 %  Min: 96 %  Max: 100 %  PULSE RANGE: Pulse  Av  Min: 66  Max: 81  BLOOD PRESSURE RANGE: Systolic (07YUE), RCI:744 , Min:125 , RHS:726   ; Diastolic (39WFZ), IBA:52, Min:60, Max:65    I/O (24Hr): No intake or output data in the 24 hours ending 21 142  Objective:  General Appearance: In no acute distress and not in pain. Vital signs: (most recent): Blood pressure 125/65, pulse 66, temperature 97.7 °F (36.5 °C), temperature source Axillary, resp. rate 16, height 5' 3\" (1.6 m), weight 115 lb 14.4 oz (52.6 kg), SpO2 100 %, not currently breastfeeding. Abdomen: Abdomen is soft. Bowel sounds are normal.   There is no abdominal tenderness. Labs/Imaging/Diagnostics    Labs:  CBC:  Recent Labs     21  0531 21  0555 21  0607   WBC 6.0 5.9 5.0   RBC 2.71* 2.55* 2.45*   HGB 8.0* 7.5* 7.4*   HCT 24.4* 23.2* 22.5*   MCV 89.9 91.1 91.8   RDW 15.7* 15.2 15.3    199 204     CHEMISTRIES:  Recent Labs     21  0505 21  0555 21  0607   * 140 141   K 4.4 4.6 4.4    106 109   CO2 28 29 28   BUN 12 14 15   CREATININE 1.0 1.0 1.1   GLUCOSE 335* 112* 275*     PT/INR:  Recent Labs     21  0505   PROTIME 11.7   INR 1.01     APTT:No results for input(s): APTT in the last 72 hours. LIVER PROFILE:No results for input(s): AST, ALT, BILIDIR, BILITOT, ALKPHOS in the last 72 hours. Imaging Last 24 Hours:  No results found.   Assessment//Plan           Hospital Problems           Last Modified POA    * (Principal) Anemia 2/5/2021 Yes    Essential hypertension (Chronic) 2/5/2021 Yes    SHARRON (acute kidney injury) ([facility] 2/5/2021 Yes    Frequent falls 1/26/2021 Yes    CKD (chronic kidney disease), stage III 2/5/2021 Yes    History of breast cancer 2/5/2021 Yes    Type 2 diabetes mellitus ([facility] 2/5/2021 Yes    Facial abscess 2/5/2021 Yes    Asymptomatic bacteriuria 2/5/2021 Yes        Assessment & Plan  64 yo w DM, HTN, CAD on ASA/Brilinta and Wilms' tumor s/p nephrectomy admitted w weakness and UTI, found to have anemia and reports profound wt loss and some diarrhea. Denies hematochezia or melena and stool is heme-neg. C diff is neg. H/H dropped to 5/16, Fe 15%. EGD/duod Bx is neg. Colonoscopy incomplete due to poor prep. CTA abd/pelvis/chest unremarkable. Patient has fever and a Lt subauricular abscess/tumor, s/p I&D by ENT on 2/3/21 and on Abx's.     Plan:   1. Further management per treating team  2. Still needs colonoscopy at some point  3.  Will follow  Queenie Lazo MD       [employer] [address]) [phone]    Electronically signed by Loly Davis MD on 2/5/21 at 2:24 PM EST

## 2021-02-05 NOTE — ONCOLOGY
ONCOLOGY HEMATOLOGY CARE PROGRESS NOTE      SUBJECTIVE:     Afebrile and on room air. Bone marrow biopsy was performed on 2021. ROS:   The remaining 10 point review of symptoms is unremarkable. OBJECTIVE        Physical    VITALS:  /60   Pulse 81   Temp 97.7 °F (36.5 °C) (Axillary)   Resp 16   Ht 5' 3\" (1.6 m)   Wt 115 lb 14.4 oz (52.6 kg)   SpO2 96%   BMI 20.53 kg/m²   TEMPERATURE:  Current - Temp: 97.7 °F (36.5 °C); Max - Temp  Av °F (36.7 °C)  Min: 97.7 °F (36.5 °C)  Max: 98.3 °F (36.8 °C)  PULSE OXIMETRY RANGE: SpO2  Av.5 %  Min: 96 %  Max: 100 %  24HR INTAKE/OUTPUT:      Intake/Output Summary (Last 24 hours) at 2021 7014  Last data filed at 2021 1330  Gross per 24 hour   Intake 650 ml   Output --   Net 650 ml       CONSTITUTIONAL:  awake, alert, cooperative, no apparent distress, HEENT oral pharynx , no scleral icterus  HEMATOLOGIC/LYMPHATICS:  no cervical lymphadenopathy, no supraclavicular lymphadenopathy, no axillary lymphadenopathy and no inguinal lymphadenopathy  R neck dressing is C/D/I   LUNGS:  No increased work of breathing, good air exchange, clear to auscultation bilaterally, no crackles or wheezing  CARDIOVASCULAR:  , regular rate and rhythm, normal S1 and S2, no S3 or S4, and no murmur noted  ABDOMEN:  No scars, normal bowel sounds, soft, non-distended, non-tender, no masses palpated, no hepatosplenomegally  MUSCULOSKELETAL:  There is no redness, warmth, or swelling of the joints. EXTREMETIES: No clubbing cynosis or edema  NEUROLOGIC:  Awake, alert, oriented to name, place and time. Cranial nerves II-XII are grossly intact. Motor is 5 out of 5 bilaterally.    SKIN:  no bruising or bleeding      Data      Recent Labs     21  1055 21  0531 21  0555   WBC 5.7 6.0 5.9   HGB 8.1* 8.0* 7.5*   HCT 24.4* 24.4* 23.2*    166 199   MCV 90.1 89.9 91.1        Recent Labs     21  1055 21  0011 02/04/21  0555   * 135* 140   K 4.7 4.4 4.6    103 106   CO2 29 28 29   BUN 13 12 14   CREATININE 1.0 1.0 1.0     No results for input(s): AST, ALT, ALB, BILIDIR, BILITOT, ALKPHOS in the last 72 hours. Magnesium:    Lab Results   Component Value Date    MG 1.90 01/31/2021    MG 2.00 01/27/2021    MG 2.00 01/07/2021         Problem List  Patient Active Problem List   Diagnosis    Essential hypertension    Bilateral malignant neoplasm of breast in female ([address]    Microalbuminuria    Obesity (BMI 30-39. 9)    Slurred speech    Hx of ischemic CVA    Brain metastases ([facility]    Carotid stenosis, bilateral:<50%:per US 7/2016    SHARRON (acute kidney injury) ([facility]    Lactic acidosis    Frequent falls    Depression/anxiety    Abnormal brain MRI    Acute bilateral low back pain without sciatica    Uncontrolled type 2 diabetes mellitus with microalbuminuria, with long-term current use of insulin (HCC)    Closed fracture of right ankle, with routine healing, subsequent encounter    CKD (chronic kidney disease), stage III    Uncontrolled type 2 diabetes mellitus with diabetic nephropathy, with long-term current use of insulin (HCC)    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 ([address]    Dyslipidemia associated with type 2 diabetes mellitus ([address]    Bipolar disorder ([address]    Cardiomegaly    Cardiomyopathy ([facility]    Carpal tunnel syndrome    Chronic systolic heart failure (HCC)    Diffuse cystic mastopathy    Edema    Gallstone pancreatitis    Gout    History of breast cancer    History of renal cell carcinoma    Cardiomyopathy in other diseases classified elsewhere    Mononeuritis    Myalgia and myositis    Nonspecific abnormal electrocardiogram (ECG) (EKG)    Patient in clinical research study    Peroneal muscular atrophy    Scoliosis (and kyphoscoliosis), idiopathic    SOBOE (shortness of breath on exertion)    Syncope and collapse    Chronic pain disorder    DDD (degenerative disc disease), lumbar    Diabetic polyneuropathy associated with type 2 diabetes mellitus (HCC)    Other secondary scoliosis, lumbosacral region    Thoracic spondylosis without myelopathy    Morbid obesity due to excess calories ([facility])    Age-related nuclear cataract of both eyes    Hypermetropia, bilateral    Hypertensive retinopathy, bilateral    Vitreous degeneration, bilateral    Acute bilateral low back pain with left-sided sciatica    History of CVA (cerebrovascular accident) without residual deficits    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 stage 3 chronic kidney disease (HCC)    S/P mastectomy, right    Acute cystitis without hematuria    Hypomagnesemia    Chest pain    CAD (coronary artery disease)    DKA (diabetic ketoacidoses) ([facility]    DKA, type 2, not at goal [facility])    Hx of heart artery stent    Nuclear senile cataract    Unintentional weight loss       ASSESSMENT AND PLAN    Anemia  - Admit CBC: WBC 5.7 K/mcL, HGB 10.1 g/dL, HCT 32.5%,  K/mcL, ANC 3.8 K/mcL, MCV 90.2 fL.  - Drifted down to HGB 5.0 g/dL and HCT 15.7% on 2/01/2021.  - Transfused 2 unit of PRBCs on 2/01/2021. Has been stable since then. - Micronutrients: Ferritin 781.3 ng/mL, iron sat 28%, B12 512 pg/mL, folate 14.87 ng/mL. - Abs retic 44 K/mcL,  U/L, hapto 87 mg/dL. - FOBT neg.  - IgG, IgA, IgM are WNL. Kappa and lambda are elevated but ratio is WNL.  - HIV neg.  - Colonoscopy, 2/01/2021, with Dr. Sallie Martin was incomplete.  - EGD, 1/29/2020, with Dr. Sallie Martin was normal.  Pathology from a duodenal biopsy was negative. - Bone marrow biopsy performed 2/03/21. Results pending. In total, blood loss seems most likely, though no source has been found and FOBT neg. Agree with bone marrow. Await pathology. Can follow up on bone marrow results outpatient if patient otherwise stable for dc on IV ATB with [facility].  Defer to IM team.     Facial

## 2021-02-05 NOTE — PROGRESS NOTES
Physician Progress Note      Tyler Anderson  [facility] #:                  [id number]  :                       1960  ADMIT DATE:       2021 3:56 PM  [address] [place] DATE:  RESPONDING  PROVIDER #:        Tee Sheffield DO          QUERY TEXT:    Patient admitted with weakness, facial abscess. Per Op note dated 2/3   documentation of I&D. To accurately reflect the procedure performed please further specify the depth   of tissue incised and drained: The medical record reflects the following:  Risk Factors: 63 y.o. female w/ left facial abscess  Clinical Indicators: CT report : \"Abnormal subcutaneous collection with   overlying skin thickening suggestive of  abscess and associated cellulitis ?in the subcutaneous tissues superficial to  the parotid gland and the superficial investing fascia. \"  Procedure note 2/3: \"An 11 blade was used to make a stab incision. Purulence   was immediately encountered and cultured. Pressure was used to express   copious amounts of foul-smelling purulent drainage. Blunt dissection was then   used to break up all loculations. Abscess cavity was packed with quarter   inch iodoform strip gauze. \"  Treatment: I/D, packing, wound care, Bactroban ointment, on abx, supportive   care and monitoring    Thank you,  Zhen Salmeron RN [facility]  [phone]  Options provided:  -- Skin only  -- Subcutaneous tissue  -- Fascia  -- Other - I will add my own diagnosis  -- Disagree - Not applicable / Not valid  -- Disagree - Clinically unable to determine / Unknown  -- Refer to Clinical Documentation Reviewer    PROVIDER RESPONSE TEXT:    Addendum to 2/3 procedure note The depth of the drainage to left facial   abscess was down to and including subcutaneous tissue. Query created by:  Terell Yañez on 2021 12:18 PM      Electronically signed by:  Tee Sheffield DO 2021 8:16 AM

## 2021-02-05 NOTE — PROGRESS NOTES
Page sent to Dr. Moriah Chan:    \"615 Patients final culture reports are in: Gram Stain Result Abnormal 02/03/2021 10:14 AM [address] Lab 1+ Gram positive cocci 2+ WBC's (Polymorphonuclear) Anaerobic Culture Streptococcus viridans group Rare growth No further workup They have her scheduled for a  time between 2-3 pm today. Are you ok with her discharging today? We will need your recommendations. Thank you. \"    Per Dr. Moriah Chan: \"Yes. Milton Rojo for [employer] on [address].  Thanks\"

## 2021-02-05 NOTE — PROGRESS NOTES
Physical Therapy  Facility/Department: [facility] C5 - MED SURG/ORTHO  Daily Treatment Note  NAME: Candelaria Bergeron  : 1960  MRN: [id number]    Date of Service: 2021    Discharge Recommendations:  Subacute/Skilled Nursing Facility   PT Equipment Recommendations  Equipment Needed: No  If pt discharges prior to next PT session this note will serve as discharge summary. Assessment   Body structures, Functions, Activity limitations: Decreased functional mobility ; Decreased balance;Decreased strength;Decreased endurance  Assessment: Pt motivated for rehab and progressing daily with PT goals. Today pt requested to amb without device. She was able to do this with min assist at gait belt for balance over 300 ft. Pt negotiated 3 six inch stairs with rail and CG. She performed therapeutic ex in supine and standing. Pt will benefit from skilled PT to address current deficits. Recommend SNF at discharge  Treatment Diagnosis: decreased gait and balance  Specific instructions for Next Treatment: progress mobility as tolerated  PT Education: Goals; General Safety;Gait Training;PT Role;Disease Specific Education;Plan of Care; Functional Mobility Training;Transfer Training  Patient Education: Benefits of RW reviewed with pt for safety at this time. She voices understanding  Barriers to Learning: none  REQUIRES PT FOLLOW UP: Yes  Activity Tolerance  Activity Tolerance: Patient Tolerated treatment well  Activity Tolerance: Vital signs stable     Patient Diagnosis(es): The primary encounter diagnosis was Acute cystitis with hematuria. Diagnoses of Frequent falls, Closed head injury, initial encounter, and Generalized weakness were also pertinent to this visit.      has a past medical history of Abnormal brain MRI, Acute bilateral low back pain without sciatica, SHARRON (acute kidney injury) ([address] Arthritis, Bipolar disorder ([facility] [address] CAD (coronary artery disease), Cancer (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 has a past surgical history that includes Kidney removal; Hysterectomy; Breast lumpectomy (2015); Tubal ligation; other surgical history (Right); Cardiac catheterization (2020); Upper gastrointestinal endoscopy (N/A, 2021); Colonoscopy (N/A, 2021); and CT BIOPSY BONE MARROW (2/3/2021). Restrictions  Restrictions/Precautions  Restrictions/Precautions: Fall Risk, General Precautions, Up as Tolerated  Position Activity Restriction  Other position/activity restrictions: up with assist  Subjective   Chart Reviewed: Yes  Response To Previous Treatment: Patient with no complaints from previous session.   Family / Caregiver Present: No  Referring Practitioner: ALANA Verdin  Subjective: Pt agreeable to therapy  Comments: resting in bed on approach  Pain Screening  Patient Currently in Pain: Denies  Vital Signs  Pulse: 66  Heart Rate Source: Monitor  BP Location: Left upper arm  Blood Pressure Sittin/79  SpO2: 100 % on room air       Orientation  Overall Orientation Status: Within Normal Limits     Objective   Bed mobility  Supine to Sit: Modified independent  Sit to Supine: Modified independent  Transfers  Sit to Stand: Supervision  Stand to sit: Supervision  Comment: transfers practiced from EOB, toilet and chair with arm rests  Ambulation   Surface: level tile  Device: No Device(pt requested to ambulate without RW today.)  Assistance: Minimal assistance  Quality of Gait: Without RW pt demonstrates mild path deviation, occassional scissoring of legs, min assist for balance provided at gait belt  Distance: 300 ft  Comments: post ambulation /65  HR80  Stairs/Curb  Stairs?: Yes     pt negotiated 3 six inch stairs with one rail and CG   Exercises  Straight Leg Raise: 10 x B  Quad Sets: 10 x B  Gluteal Sets: 15 x B  Sit to and from stand 5 x in succession  Standing slow march 10 x B at countertop  Standing alternate side kicks 10 x B at countertop  Ankle Pumps: 10 x B                Standing B heel raise 10 x at countertop    Comment: Assisted to restroom to urinate, indep managing garments and hygeine. Able to stand at sink to wash and dry hands with SBA     AM-PAC Score  AM-PAC Inpatient Mobility Raw Score : 20 (02/05/21 0952)  AM-PAC Inpatient T-Scale Score : 47.67 (02/05/21 0952)  Mobility Inpatient CMS 0-100% Score: 35.83 (02/05/21 5185)  Mobility Inpatient CMS G-Code Modifier : CJ (02/05/21 6765)        Goals  Short term goals  Time Frame for Short term goals: 1 week (2/03) unless otherwise specified  Short term goal 1: Pt will be mod I with bed mobility.  -2/05  mod indep- goal met  Short term goal 2: Pt will be supervision for transfers with RW.  -2/05  SBA  Short term goal 3: Pt will ambulate 150 ft with supervision and RW.  -2/5, pt amb without device 300 ft but required min assist for balance without RW  Short term goal 4: Pt will negotiate 4 stairs with rail and SBA (if d/c home).   -2/05, pt negotiated 3 stairs with rail and CG  Short term goal 5: 1/31: Pt will participate in 12-15 reps of BLE exercises to promote strength and activity tolerance.  -2/05 on-going  Patient Goals   Patient goals : \"to go home\"    Plan    Times per week: 3-5x/wk  Times per day: Daily  Specific instructions for Next Treatment: progress mobility as tolerated  Current Treatment Recommendations: Strengthening, Neuromuscular Re-education, Safety Education & Training, Balance Training, Endurance Training, Functional Mobility Training, Transfer Training, Gait Training, Equipment Evaluation, Education, & procurement, Stair training, Patient/Caregiver Education & Training  Safety Devices  Type of devices:  All fall risk precautions in place, Gait belt, Patient at risk for falls, Nurse notified, Bed alarm in place, Left in bed     Therapy Time   Individual Concurrent Group Co-treatment   Time In 0915         Time Out 0955         Minutes [address], [place]

## 2021-02-05 NOTE — PROGRESS NOTES
[address] [facility] SURGERY        Patient Name: Garrett Enriquez Record Number:  [id number]  Primary Care Physician:  Cal Bell  Date of Consultation: 2/5/2021    Chief Complaint: Left facial pain    Subjective: Patient seen and examined. Continued improvement in left facial pain    PHYSICAL EXAM  /65   Pulse 81   Temp 97.7 °F (36.5 °C) (Axillary)   Resp 16   Ht 5' 3\" (1.6 m)   Wt 115 lb 14.4 oz (52.6 kg)   SpO2 96%   BMI 20.53 kg/m²     GENERAL: No Acute Distress, Alert and Oriented, no hoarseness  FACE: Continued improvement in erythema and edema surrounding left facial abscess. 3 cm of packing removed. NECK: Normal range of motion, no thyromegaly, trachea is midline, no lymphadenopathy, no neck masses, no crepitus        ASSESSMENT/PLAN  Mariely Martinez is a very pleasant 63 y.o. female with left facial abscess status post incision and drainage 2/3/2021  -Improving  -3 cm of packing removed today  -Gram-positive cocci on Gram stain, culture pending  -ABX per ID  -If patient ready for DC from medicine standpoint can discharge to facility and have remainder of packing removed tomorrow. If she remains in-house overnight I will remove remaining packing tomorrow a.m. Please contact service with questions or concerns    Philip Sheehan, DO  Otolaryngology  Medical Decision Making:   The following items were considered in medical decision making:  Independent review of images  Review / order clinical lab tests  Review / order radiology tests  Decision to obtain old records

## 2021-02-06 NOTE — DISCHARGE SUMMARY
Hospital Medicine Discharge Summary    Patient ID: Janae Hopper      Patient's PCP: Carmine Berry Date: 1/26/2021     Discharge Date: 2/5/2021      Admitting Physician: Glenys Magdaleno MD     Discharge Physician: Filomena Peng MD     Discharge Diagnoses: Active Hospital Problems    Diagnosis    Anemia [D64.9]     Priority: High    Facial abscess [L02.01]     Priority: Medium    Asymptomatic bacteriuria [R82.71]     Priority: Medium    Type 2 diabetes mellitus (HCC) [E11.9]    CKD (chronic kidney disease), stage III [N18.30]    Frequent falls [R29.6]    SHARRON (acute kidney injury) ([facility] [address] [N17.9]    History of breast cancer [Z85.3]    Essential hypertension [I10]       The patient was seen and examined on day of discharge and this discharge summary is in conjunction with any daily progress note from day of discharge. HPI :     63 y.o. female, with PMH of HTN, HLD, CAD status post PCI, DM 2, breast CA, anxiety, and depression, who presented to [facility] with frequent falls and fatigue. History was obtained from the patient and review of the EMR. The patient states that she has been having a hard time walking lately and even today she has fallen about 15 times at home. She does admit to hitting her head a few times, but denies any loss of consciousness at all. She denies any lightheadedness or dizziness, only states that she has weakness in her legs. States she had to scoot herself around on the floor to call for help earlier as she kept falling. Today she does complain of some right foot and ankle pain due to falling and straining this area. Her family and her decided to come into the ED for further evaluation as she is progressively getting worse with her weakness. Per family report and patient report, it appears that the patient has also lost about 100 pounds over the past year or so and they are worried about her wellbeing.   She states she has spoken to her PCP numerous times about this, but has not really had much workup done. She will be admitted for further observation and possible SNF placement.   her hemoglobin progressively declined during admission requiring blood transfusions on 2/1/2021 for hgb 5. Hospital Course:        Progressive anemia: Unclear etiology. s/p 2 PRBCs on 2/1 with improvement in hgb. No overt signs of GIB. FOBT neg. EGD remarkable and underwent colonoscopy on 2/1 with poor prep- unrevealing of any acute abnormality. CTA abdomen with no intra-abdominal bleeding. GI and hemonc assisted,   S/p BM biopsy on 2/3. Path still pending at d/c. hgb stable remained stable at 7.4.      Generalized weakness: likely due to severe debilitation, anemia, infection. PT/OT  rec SNF to which she was discharged to in stable condition       Diabetes mellitus type 2 with hyperglycemia: not controlled. Hemoglobin A1c is 13.8%, indicating non compliance with diet, meds. Med compliance emphasized. Continue home meds.          Abnormal UA : UTI initially suspected. Ruled out per ID. Likely asymptomatic bacteruria. Initial UC grew E coli. Was on Bactrim which was later stopped and developed fever. Started on IV ceftriaxone. Repeat urine culture on 2/1 grew MSSA and Enterococcus. No need for abx to treat bacteruria per ID. Ceftriaxone was d/padmaja.         Left facial/preauricular cellulitis and abscess: noted on CT. ENT consulted- underwent bedside I & D on 2/3. Culture grew stept viridans. IV antibiotics deescalated to p.o. clindamycin x5 days at discharge. Continue wound care per ENT discharge instructions.             Hypertension: Controlled,  continue BP meds           Coronary artery disease   stable. Denied any chest pain.   Continue antiplatelets, statin         Breast cancer  In remission.    letrozole DC'd per heme-onc.      Dyslipidemia  Continue statin      Physical Exam Performed:     /65   Pulse 66   Temp 97.7 °F (36.5 °C) (Axillary) Resp 16   Ht 5' 3\" (1.6 m)   Wt 115 lb 14.4 oz (52.6 kg)   SpO2 100%   BMI 20.53 kg/m²      General appearance:   In no acute distress, is alert  and cooperative. HEENT: Pupils equal, round, Conjunctivae/corneas clear. Neck: Supple, with full range of motion. No jugular venous distention. Trachea midline. Left jaw surgical site with packing in place . Respiratory:  Normal respiratory effort. Clear to auscultation, bilaterally without Rales/Wheezes/Rhonchi. Cardiovascular: Regular rate and rhythm with normal S1/S2 . Abdomen: Soft, non-tender, non-distended with normal bowel sounds. Musculoskeletal: No clubbing, cyanosis or edema bilaterally.    Neurologic:   Alert, speech clear with no overt facial droop  Psychiatric: Alert and oriented, thought content appropriate, normal insight  Labs: For convenience and continuity at follow-up the following most recent labs are provided:      CBC:    Lab Results   Component Value Date    WBC 5.0 02/05/2021    HGB 7.4 02/05/2021    HCT 22.5 02/05/2021     02/05/2021       Renal:    Lab Results   Component Value Date     02/05/2021    K 4.4 02/05/2021    K 3.5 01/27/2021     02/05/2021    CO2 28 02/05/2021    BUN 15 02/05/2021    CREATININE 1.1 02/05/2021    CALCIUM 8.1 02/05/2021    PHOS 3.3 01/07/2021         Significant Diagnostic Studies    Radiology:   CT BIOPSY BONE MARROW   Final Result   Successful CT guided bone marrow aspiration and core biopsy of the right   iliac bone. CT GUIDED NEEDLE PLACEMENT   Final Result   Successful CT guided bone marrow aspiration and core biopsy of the right   iliac bone. CT SOFT TISSUE NECK W CONTRAST   Final Result   Abnormal subcutaneous collection with overlying skin thickening suggestive of   abscess and associated cellulitis  in the subcutaneous tissues superficial to   the parotid gland and the superficial investing fascia.   No significant   adenopathy         CT CHEST W CONTRAST   Final DISEASES  IP CONSULT TO PHARMACY    Disposition: SNF      Condition at Discharge: Stable    Discharge Instructions/Follow-up:    - Remove packing from abscess on left side of face on 02/06/2021, per ENT. -Follow-up with heme-onc    Code Status:  Prior     Activity: activity as tolerated    Diet: diabetic diet      Discharge Medications:     Discharge Medication List as of 2/5/2021  2:00 PM           Details   lactobacillus (CULTURELLE) capsule Take 2 capsules by mouth 2 times daily (with meals), Disp-30 capsule, R-0NO PRINT      clindamycin (CLEOCIN) 300 MG capsule Take 1 capsule by mouth 4 times daily for 5 days, Disp-20 capsule, R-0NO PRINT              Details   oxyCODONE-acetaminophen (PERCOCET) 5-325 MG per tablet Take 1 tablet by mouth every 6 hours as needed for Pain for up to 3 days. , Disp-10 tablet, R-0Print      paliperidone (INVEGA) 6 MG extended release tablet Take 1 tablet by mouth every morning, Disp-30 tablet, R-3Print      ticagrelor (BRILINTA) 90 MG TABS tablet Take 1 tablet by mouth 2 times daily, Disp-60 tablet, R-1Print              Details   TRUE METRIX BLOOD GLUCOSE TEST strip USE AS DIRECTED TO TEST 4 TIMES A DAY, Disp-100 strip, R-5Normal      JARDIANCE 25 MG tablet TAKE 1 TABLET BY MOUTH EVERY DAY IN THE MORNING, DAWHistorical Med      insulin glargine (LANTUS SOLOSTAR) 100 UNIT/ML injection pen Inject 25 Units into the skin every morning, Disp-1 pen,R-1Normal      Liraglutide (VICTOZA) 18 MG/3ML SOPN SC injection Inject 1.8 mg into the skin daily, Disp-3 pen,R-5Normal      nitroGLYCERIN (NITROSTAT) 0.4 MG SL tablet up to max of 3 total doses.  If no relief after 1 dose, call 911., Disp-25 tablet,R-3Normal      Insulin Pen Needle (UNIFINE PENTIPS) 32G X 4 MM MISC Disp-100 each, R-6, NormalUSE AS DIRECTED 3 TIMES A DAY      simvastatin (ZOCOR) 20 MG tablet Take 20 mg by mouth nightlyHistorical Med      cetirizine (ZYRTEC) 10 MG tablet Take 10 mg by mouth dailyHistorical Med      lisinopril

## 2021-02-08 ENCOUNTER — HOSPITAL ENCOUNTER (EMERGENCY)
Age: 61
Discharge: HOME | End: 2021-02-08
Payer: MEDICAID

## 2021-02-08 ENCOUNTER — APPOINTMENT (OUTPATIENT)
Dept: GENERAL RADIOLOGY | Age: 61
End: 2021-02-08
Payer: MEDICAID

## 2021-02-08 VITALS
HEIGHT: 63 IN | OXYGEN SATURATION: 98 % | HEART RATE: 66 BPM | DIASTOLIC BLOOD PRESSURE: 52 MMHG | SYSTOLIC BLOOD PRESSURE: 124 MMHG | TEMPERATURE: 99.4 F | RESPIRATION RATE: 14 BRPM | BODY MASS INDEX: 20.38 KG/M2 | WEIGHT: 115 LBS

## 2021-02-08 DIAGNOSIS — R07.9 CHEST PAIN, UNSPECIFIED TYPE: Primary | ICD-10-CM

## 2021-02-08 LAB
A/G RATIO: 1 (ref 1.1–2.2)
ALBUMIN SERPL-MCNC: 3.4 G/DL (ref 3.4–5)
ALP BLD-CCNC: 288 U/L (ref 40–129)
ALT SERPL-CCNC: 55 U/L (ref 10–40)
ANAEROBIC CULTURE: ABNORMAL
ANION GAP SERPL CALCULATED.3IONS-SCNC: 10 MMOL/L (ref 3–16)
AST SERPL-CCNC: 56 U/L (ref 15–37)
BASOPHILS ABSOLUTE: 0 K/UL (ref 0–0.2)
BASOPHILS RELATIVE PERCENT: 0.3 %
BILIRUB SERPL-MCNC: 0.3 MG/DL (ref 0–1)
BUN BLDV-MCNC: 11 MG/DL (ref 7–20)
CALCIUM SERPL-MCNC: 8.6 MG/DL (ref 8.3–10.6)
CHLORIDE BLD-SCNC: 101 MMOL/L (ref 99–110)
CO2: 28 MMOL/L (ref 21–32)
CREAT SERPL-MCNC: 1.1 MG/DL (ref 0.6–1.2)
EOSINOPHILS ABSOLUTE: 0 K/UL (ref 0–0.6)
EOSINOPHILS RELATIVE PERCENT: 0.6 %
GFR AFRICAN AMERICAN: >60
GFR NON-AFRICAN AMERICAN: 50
GLOBULIN: 3.4 G/DL
GLUCOSE BLD-MCNC: 311 MG/DL (ref 70–99)
GRAM STAIN RESULT: ABNORMAL
HCT VFR BLD CALC: 27.2 % (ref 36–48)
HEMOGLOBIN: 8.7 G/DL (ref 12–16)
LIPASE: 5 U/L (ref 13–60)
LYMPHOCYTES ABSOLUTE: 1 K/UL (ref 1–5.1)
LYMPHOCYTES RELATIVE PERCENT: 16.9 %
MCH RBC QN AUTO: 30.2 PG (ref 26–34)
MCHC RBC AUTO-ENTMCNC: 32 G/DL (ref 31–36)
MCV RBC AUTO: 94.3 FL (ref 80–100)
MONOCYTES ABSOLUTE: 0.4 K/UL (ref 0–1.3)
MONOCYTES RELATIVE PERCENT: 6.6 %
NEUTROPHILS ABSOLUTE: 4.4 K/UL (ref 1.7–7.7)
NEUTROPHILS RELATIVE PERCENT: 75.6 %
ORGANISM: ABNORMAL
PDW BLD-RTO: 16.5 % (ref 12.4–15.4)
PLATELET # BLD: 245 K/UL (ref 135–450)
PMV BLD AUTO: 9.1 FL (ref 5–10.5)
POTASSIUM SERPL-SCNC: 4.3 MMOL/L (ref 3.5–5.1)
RBC # BLD: 2.88 M/UL (ref 4–5.2)
SODIUM BLD-SCNC: 139 MMOL/L (ref 136–145)
SPECIMEN STATUS: NORMAL
TOTAL PROTEIN: 6.8 G/DL (ref 6.4–8.2)
TROPONIN: <0.01 NG/ML
TROPONIN: <0.01 NG/ML
WBC # BLD: 5.9 K/UL (ref 4–11)
WOUND/ABSCESS: ABNORMAL

## 2021-02-08 PROCEDURE — 93005 ELECTROCARDIOGRAM TRACING: CPT | Performed by: NURSE PRACTITIONER

## 2021-02-08 PROCEDURE — 96374 THER/PROPH/DIAG INJ IV PUSH: CPT

## 2021-02-08 PROCEDURE — 80053 COMPREHEN METABOLIC PANEL: CPT

## 2021-02-08 PROCEDURE — 99285 EMERGENCY DEPT VISIT HI MDM: CPT

## 2021-02-08 PROCEDURE — 6360000002 HC RX W HCPCS: Performed by: NURSE PRACTITIONER

## 2021-02-08 PROCEDURE — 85025 COMPLETE CBC W/AUTO DIFF WBC: CPT

## 2021-02-08 PROCEDURE — 83690 ASSAY OF LIPASE: CPT

## 2021-02-08 PROCEDURE — 84484 ASSAY OF TROPONIN QUANT: CPT

## 2021-02-08 PROCEDURE — 71045 X-RAY EXAM CHEST 1 VIEW: CPT

## 2021-02-08 RX ORDER — KETOROLAC TROMETHAMINE 30 MG/ML
15 INJECTION, SOLUTION INTRAMUSCULAR; INTRAVENOUS ONCE
Status: COMPLETED | OUTPATIENT
Start: 2021-02-08 | End: 2021-02-08

## 2021-02-08 RX ORDER — ASPIRIN 81 MG/1
324 TABLET, CHEWABLE ORAL ONCE
Status: DISCONTINUED | OUTPATIENT
Start: 2021-02-08 | End: 2021-02-08 | Stop reason: HOSPADM

## 2021-02-08 RX ADMIN — KETOROLAC TROMETHAMINE 15 MG: 30 INJECTION, SOLUTION INTRAMUSCULAR at 18:03

## 2021-02-08 ASSESSMENT — PAIN DESCRIPTION - ORIENTATION: ORIENTATION: MID

## 2021-02-08 ASSESSMENT — PAIN SCALES - GENERAL: PAINLEVEL_OUTOF10: 8

## 2021-02-08 ASSESSMENT — PAIN DESCRIPTION - LOCATION: LOCATION: CHEST

## 2021-02-08 ASSESSMENT — HEART SCORE: ECG: 0

## 2021-02-08 NOTE — ED NOTES
Fall precautions remain in place. Bed alarm, fall socks, fall wristband all remain in place.      Jesusita Cockayne, RN  02/08/21 7992

## 2021-02-08 NOTE — ED PROVIDER NOTES
[facility] Emergency Department    CHIEF COMPLAINT  Chest Pain (2 Nitro at [facility] and one in en route.  )      HISTORY OF PRESENT ILLNESS  Rosario Tucker is a 63 y.o. female who presents to the ED complaining of chest pain she describes as a \"pulling\" and one episode of nonbloody emesis that started this morning just after eating breakfast.  Patient had received 3 nitroglycerin prior to arrival to the emergency department and reports her chest pain has improved, but is still there. Patient reports that the chest pain is in the center of her chest and radiates to the left chest wall. Patient denies associated cough, fever, shortness of breath, diaphoresis, dizziness, syncope, abdominal pain, diarrhea, constipation, urinary complaints. Patient does have a history of coronary artery disease and had a stent placed in June 2020. Patient denies recent illness, she is from a skilled nursing facility, no known COVID-19 exposure. No other complaints, modifying factors or associated symptoms. Nursing notes reviewed. Past Medical History:   Diagnosis Date    Abnormal brain MRI 7/20/2017    Partially empty sella and minimal chronic small vessel ischemic disease    Acute bilateral low back pain without sciatica 11/2/2016    SHARRON (acute kidney injury) ([facility] 7/5/2017    Arthritis     back    Bipolar disorder ([address] 10/18/2008    CAD (coronary artery disease)     stent placed 6/8/20    Cancer [facility]) 2015    bilateral breast:s/p lumpectomy/radiation:under care care of breast specialist:Dr. Boone     Carotid stenosis, bilateral:<50%:per US 7/2016 7/15/2016    Carpal tunnel syndrome 10/18/2008    Cervical cancer screening 2014    Nml per pt'.     Coronary artery disease of native artery of native heart with stable angina pectoris ([facility] 6/9/2020    DDD (degenerative disc disease), lumbar 7/18/2018    Depression     under care of pschiatrist:Dr. 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     Scoliosis     Spondylosis of lumbar region without myelopathy or radiculopathy 3/10/2017    Transient cerebral ischemia 07/15/2016    TIA:7/10/16    Unspecified cerebral artery occlusion with cerebral infarction     TIA     Past Surgical History:   Procedure Laterality Date    BREAST LUMPECTOMY  2015    Bilateral:breast cancer    CARDIAC CATHETERIZATION  06/08/2020    Dr. Gilma Velarde), DAYANA to Diag 1    COLONOSCOPY N/A 2/1/2021    COLONOSCOPY DIAGNOSTIC performed by Bernadette Hawkins MD at [address] [facility] [address] BONE MARROW BIOPSY  2/3/2021    CT BONE MARROW BIOPSY 2/3/2021 Dotty Vines MD [facility] CT SCAN    HYSTERECTOMY      Benign:no cervical cancer per pt'    KIDNEY REMOVAL      right    OTHER SURGICAL HISTORY Right     orif right ankle    TUBAL LIGATION      UPPER GASTROINTESTINAL ENDOSCOPY N/A 1/29/2021    EGD BIOPSY performed by Bernadette Hawkins MD at [address]     Family History   Problem Relation Age of Onset    Cancer Mother         breast    Cancer Father     Heart Failure Neg Hx     High Cholesterol Neg Hx     Hypertension Neg Hx     Migraines Neg Hx     Rashes/Skin Problems Neg Hx     Seizures Neg Hx     Stroke Neg Hx     Thyroid Disease Neg Hx     Diabetes Neg Hx      Social History     Socioeconomic History    Marital status:      Spouse name: Not on file    Number of children: Not on file    Years of education: Not on file    Highest education level: Not on file   Occupational History    Occupation:    Social Needs    Financial resource strain: Not on file    Food insecurity     Worry: Not on file     Inability: Not on file    Transportation needs     Medical: Not on file     Non-medical: Not on file   Tobacco Use    Smoking status: Former Smoker     Packs/day: 0.50     Years: 20.00     Pack years: 10.00     Types: Cigarettes, Cigars     Quit date: 7/3/2014     Years since quittin.6    Smokeless tobacco: Never Used   Substance and Sexual Activity    Alcohol use: No     Alcohol/week: 0.0 standard drinks    Drug use: No    Sexual activity: Never   Lifestyle    Physical activity     Days per week: Not on file     Minutes per session: Not on file    Stress: Not on file   Relationships    Social connections     Talks on phone: Not on file     Gets together: Not on file     Attends Sabianism service: Not on file     Active member of club or organization: Not on file     Attends meetings of clubs or organizations: Not on file     Relationship status: Not on file    Intimate partner violence     Fear of current or ex partner: Not on file     Emotionally abused: Not on file     Physically abused: Not on file     Forced sexual activity: Not on file   Other Topics Concern    Not on file   Social History Narrative    Not on file     Current Facility-Administered Medications   Medication Dose Route Frequency Provider Last Rate Last Admin    aspirin chewable tablet 324 mg  324 mg Oral Once SARTHAK Gutierrez - CNP         Current Outpatient Medications   Medication Sig Dispense Refill    oxyCODONE-acetaminophen (PERCOCET) 5-325 MG per tablet Take 1 tablet by mouth every 6 hours as needed for Pain for up to 3 days.  10 tablet 0    lactobacillus (CULTURELLE) capsule Take 2 capsules by mouth 2 times daily (with meals) 30 capsule 0    clindamycin (CLEOCIN) 300 MG capsule Take 1 capsule by mouth 4 times daily for 5 days 20 capsule 0    paliperidone (INVEGA) 6 MG extended release tablet Take 1 tablet by mouth every morning 30 tablet 3    ticagrelor (BRILINTA) 90 MG TABS tablet Take 1 tablet by mouth 2 times daily 60 tablet 1    TRUE METRIX BLOOD GLUCOSE TEST strip USE AS DIRECTED TO TEST 4 TIMES A  strip 5    JARDIANCE 25 MG tablet TAKE 1 TABLET BY MOUTH EVERY DAY IN THE MORNING      insulin glargine (LANTUS SOLOSTAR) 100 UNIT/ML injection pen Inject 25 Units into the skin every morning (Patient taking differently: Inject 26 Units into the skin every morning ) 1 pen 1    Liraglutide (VICTOZA) 18 MG/3ML SOPN SC injection Inject 1.8 mg into the skin daily 3 pen 5    nitroGLYCERIN (NITROSTAT) 0.4 MG SL tablet up to max of 3 total doses. If no relief after 1 dose, call 911. 25 tablet 3    Insulin Pen Needle (UNIFINE PENTIPS) 32G X 4 MM MISC USE AS DIRECTED 3 TIMES A  each 6    simvastatin (ZOCOR) 20 MG tablet Take 20 mg by mouth nightly      cetirizine (ZYRTEC) 10 MG tablet Take 10 mg by mouth daily      lisinopril (PRINIVIL;ZESTRIL) 10 MG tablet Take 10 mg by mouth daily      Empagliflozin-metFORMIN HCl 12.5-1000 MG TABS Take 2 tablets by mouth daily 60 tablet 5    Blood Pressure Monitor GINA Check BP at home once or twice a day 1 Device 0    Insulin Syringe-Needle U-100 31G X 5/16\" 0.3 ML MISC USE 3 TIMES A DAY BEFORE MEALS 90 each 7    calcium carbonate-vitamin D (CALTRATE) 600-400 MG-UNIT TABS per tab 1 tablet daily       glucagon 1 MG injection Inject 1 mg into the muscle See Admin Instructions Follow package directions for low blood sugar.  1 kit 0    aspirin 81 MG tablet Take 81 mg by mouth daily      gabapentin (NEURONTIN) 600 MG tablet Take 600 mg by mouth 3 times daily      traZODone (DESYREL) 100 MG tablet Take 100 mg by mouth nightly       Allergies   Allergen Reactions    Morphine Anaphylaxis and Hives     feels like throat is closing    Penicillins Hives and Swelling    Codeine Hives and Rash    Penicillin G Rash       REVIEW OF SYSTEMS  10 systems reviewed, pertinent positives per HPI otherwise noted to be negative    PHYSICAL EXAM  /61   Pulse 57   Temp 99.4 °F (37.4 °C) (Oral)   Resp 12   Ht 5' 3\" (1.6 m)   Wt 115 lb (52.2 kg)   SpO2 99%   BMI 20.37 kg/m²   GENERAL APPEARANCE: Awake and alert. Cooperative. No acute distress. Vital signs are stable. Well appearing and non toxic. HEAD: Normocephalic. Atraumatic. EYES: PERRL. EOM's grossly intact. ENT: Mucous membranes are moist.   NECK: Supple. Normal ROM. HEART: RRR. Distal pulses are equal and intact. Cap refill less than 2 seconds. LUNGS: Respirations unlabored. CTAB. Good air exchange. Speaking comfortably in full sentences. No wheezing, rhonchi, rales, stridor. ABDOMEN: Soft. Non-distended. Non-tender. No guarding or rebound. No masses. No organomegaly. No rigidity. Bowel sounds are present. Negative robert's. Negative McBurney's point. Negative CVA tenderness. EXTREMITIES: No peripheral edema. Moves all extremities equally. All extremities neurovascularly intact. SKIN: Warm and dry. No acute rashes. NEUROLOGICAL: Alert and oriented. No gross facial drooping. Strength 5/5, sensation intact. PSYCHIATRIC: Normal mood and affect.     SCREENINGS        Heart Score  Heart Score for chest pain patients  History: Slightly Suspicious  ECG: Normal  Patient Age: > 39 and < 65 years  *Risk factors for Atherosclerotic disease: Diabetes Mellitus, Hypercholesterolemia, Hypertension, Coronary Artery Disease  Risk Factors: > 3 Risk factors or history of atherosclerotic disease*  Troponin: < 1X normal limit  Heart Score Total: 3       RADIOLOGY    Xr Chest Portable    Result Date: 2/8/2021  EXAMINATION: ONE XRAY VIEW OF THE CHEST 2/8/2021 3:09 pm COMPARISON: CT of the chest from 02/01/2021 HISTORY: ORDERING SYSTEM PROVIDED HISTORY: chest pain TECHNOLOGIST PROVIDED HISTORY: Reason for exam:->chest pain FINDINGS: The lungs are without acute focal process. There is no effusion or pneumothorax. The cardiomediastinal silhouette is without acute process. The osseous structures are without acute process. No acute process. CONSULTS  IP CONSULT TO CARDIOLOGY    ED COURSE/MDM  Patient seen and evaluated. Old records reviewed. Diagnostic testing reviewed and results discussed. I have independently evaluated this patient based upon my scope of practice. Supervising physician was in the department for consultation as needed. Joseph Murcia presented to the ED today with above noted complaints. Initial work-up EKG interpreted by my attending physician, no ST elevation, no significant change when compared to prior. Chest x-ray shows no acute process. No effusion or pneumothorax. CBC and CMP are unremarkable chronic stable anemia hemoglobin and hematocrit 8.7 and 27.2 no leukocytosis, neutropenia, bandemia. Stable transaminitis no change from prior. No electrolyte abnormality. No acute kidney injury. Lipase is 5. Initial troponin less than 0.01. Repeat troponin less than 0.01. Consulted with cardiology given reassuring emergency department course, physical examination, and recent cardiac catheterization with stent placement, heart score of 3, cardiology feels comfortable with patient being discharged back to SNF with close cardiology outpatient follow-up. Patient also agreeable with this plan. Patient received Toradol for pain, with good relief. While in ED patient received   Medications   aspirin chewable tablet 324 mg (324 mg Oral Not Given 2/8/21 1813)   ketorolac (TORADOL) injection 15 mg (15 mg Intravenous Given 2/8/21 1803)             At this point I do not feel the patient requires further work up and it is reasonable to discharge the patient.      A discussion was had with the patient and/or their surrogate regarding diagnosis, diagnostic testing results, treatment/ plan of care, and follow up. There was shared decision-making between myself as well as the patient and/or their surrogate and we are all in agreement with discharge home. There was an opportunity for questions and all questions were answered to the best of my ability and to the satisfaction of the patient and/or patient family. Patient will follow up with cardiology for further evaluation/treatment. The patient was given strict return precautions as we discussed symptoms that would necessitate return to the ED. Patient will return to ED for new/worsening symptoms. The patient verbalized their understanding and agreement with the above plan. Please refer to AVS for further details regarding discharge instructions.       Results for orders placed or performed during the hospital encounter of 02/08/21   CBC auto differential   Result Value Ref Range    WBC 5.9 4.0 - 11.0 K/uL    RBC 2.88 (L) 4.00 - 5.20 M/uL    Hemoglobin 8.7 (L) 12.0 - 16.0 g/dL    Hematocrit 27.2 (L) 36.0 - 48.0 %    MCV 94.3 80.0 - 100.0 fL    MCH 30.2 26.0 - 34.0 pg    MCHC 32.0 31.0 - 36.0 g/dL    RDW 16.5 (H) 12.4 - 15.4 %    Platelets 090 018 - 639 K/uL    MPV 9.1 5.0 - 10.5 fL    Neutrophils % 75.6 %    Lymphocytes % 16.9 %    Monocytes % 6.6 %    Eosinophils % 0.6 %    Basophils % 0.3 %    Neutrophils Absolute 4.4 1.7 - 7.7 K/uL    Lymphocytes Absolute 1.0 1.0 - 5.1 K/uL    Monocytes Absolute 0.4 0.0 - 1.3 K/uL    Eosinophils Absolute 0.0 0.0 - 0.6 K/uL    Basophils Absolute 0.0 0.0 - 0.2 K/uL   Comprehensive metabolic panel   Result Value Ref Range    Sodium 139 136 - 145 mmol/L    Potassium 4.3 3.5 - 5.1 mmol/L    Chloride 101 99 - 110 mmol/L    CO2 28 21 - 32 mmol/L    Anion Gap 10 3 - 16    Glucose 311 (H) 70 - 99 mg/dL    BUN 11 7 - 20 mg/dL    CREATININE 1.1 0.6 - 1.2 mg/dL    GFR Non-African American 50 (A) >60    GFR African American >60 >60    Calcium 8.6 8.3 - 10.6 mg/dL    Total Protein 6.8 6.4 - 8.2 g/dL    Albumin 3.4 3.4 - 5.0 g/dL    Albumin/Globulin Ratio 1.0 (L) 1.1 - 2.2    Total Bilirubin 0.3 0.0 - 1.0 mg/dL    Alkaline Phosphatase 288 (H) 40 - 129 U/L    ALT 55 (H) 10 - 40 U/L    AST 56 (H) 15 - 37 U/L    Globulin 3.4 g/dL   Troponin   Result Value Ref Range    Troponin <0.01 <0.01 ng/mL   Sample possible blood bank testing   Result Value Ref Range    Specimen Status KIMMY    Lipase   Result Value Ref Range    Lipase 5.0 (L) 13.0 - 60.0 U/L   Troponin   Result Value Ref Range    Troponin <0.01 <0.01 ng/mL   EKG 12 Lead   Result Value Ref Range    Ventricular Rate 68 BPM    Atrial Rate 68 BPM    P-R Interval 148 ms    QRS Duration 72 ms    Q-T Interval 408 ms    QTc Calculation (Bazett) 433 ms    P Axis 53 degrees    R Axis 0 degrees    T Axis 38 degrees    Diagnosis       Normal sinus rhythmNormal ECGWhen compared with ECG of 26-JAN-2021 16:29,No significant change was found   EKG 12 Lead   Result Value Ref Range    Ventricular Rate 56 BPM    Atrial Rate 56 BPM    P-R Interval 164 ms    QRS Duration 68 ms    Q-T Interval 458 ms    QTc Calculation (Bazett) 441 ms    P Axis 62 degrees    R Axis -13 degrees    T Axis 35 degrees    Diagnosis       Sinus bradycardiaOtherwise normal ECGWhen compared with ECG of 08-FEB-2021 15:11,No significant change was found       I estimate there is LOW risk for PULMONARY EMBOLISM, ACUTE CORONARY SYNDROME, OR THORACIC AORTIC DISSECTION, thus I consider the discharge disposition reasonable. Gabriel Lomeli and I have discussed the diagnosis and risks, and we agree with discharging home to follow-up with their primary doctor. We also discussed returning to the Emergency Department immediately if new or worsening symptoms occur. We have discussed the symptoms which are most concerning (e.g., bloody sputum, fever, worsening pain or shortness of breath, vomiting) that necessitate immediate return. FINAL Impression    1.  Chest pain, unspecified type        Blood pressure 125/61, pulse 57, temperature 99.4 °F (37.4 °C), temperature source Oral, resp. rate 12, height 5' 3\" (1.6 m), weight 115 lb (52.2 kg), SpO2 99 %, not currently breastfeeding.mdm    Patient was sent home with a prescription for below medication/s. I did [place] patient on appropriate use of these medication. New Prescriptions    No medications on file           [address]  MOB 2 [facility]  [address] [facility]  ED  43 [facility]          DISPOSITION  Patient was discharged to home in good condition. Comment: Please note this report has been produced using speech recognition software and may contain errors related to that system including errors in grammar, punctuation, and spelling, as well as words and phrases that may be inappropriate. If there are any questions or concerns please feel free to contact the dictating provider for clarification.             SARTHAK Robbins CNP  02/08/21 2013

## 2021-02-08 NOTE — ED NOTES
Bed: 17  Expected date: 2/8/21  Expected time: 2:45 PM  Means of arrival:   Comments:  [facility] [address], RN  02/08/21 8637

## 2021-02-08 NOTE — ED PROVIDER NOTES
EKG: Sinus rhythm rate of 68 bpm.  No ST elevation or arrhythmia.   Similar to prior from 1/26/2021      Lolis Weaver MD  02/08/21 [id number]

## 2021-02-08 NOTE — ED NOTES
[address] Cardiology   chest pain  [facility]  02/08/21 9485

## 2021-02-09 LAB
EKG ATRIAL RATE: 56 BPM
EKG ATRIAL RATE: 68 BPM
EKG DIAGNOSIS: NORMAL
EKG DIAGNOSIS: NORMAL
EKG P AXIS: 53 DEGREES
EKG P AXIS: 62 DEGREES
EKG P-R INTERVAL: 148 MS
EKG P-R INTERVAL: 164 MS
EKG Q-T INTERVAL: 408 MS
EKG Q-T INTERVAL: 458 MS
EKG QRS DURATION: 68 MS
EKG QRS DURATION: 72 MS
EKG QTC CALCULATION (BAZETT): 433 MS
EKG QTC CALCULATION (BAZETT): 441 MS
EKG R AXIS: -13 DEGREES
EKG R AXIS: 0 DEGREES
EKG T AXIS: 35 DEGREES
EKG T AXIS: 38 DEGREES
EKG VENTRICULAR RATE: 56 BPM
EKG VENTRICULAR RATE: 68 BPM

## 2021-02-09 PROCEDURE — 93010 ELECTROCARDIOGRAM REPORT: CPT | Performed by: INTERNAL MEDICINE

## 2021-02-09 NOTE — ED NOTES
Educated pt on discharge paperwork as well as follow-up appointment. Pt verbalizes understanding of all instructions and denies questions. IV discontinued, catheter intact, minimal bleeding at IV site, 2x2 and tape applied, manual pressure held. Pt left ambulatory by self with all personal belongings, and discharge paperwork to private residence. Pt in no distress at this time. Pt to lobby to wait for [address] transportation. Pt agreeable to wait.        Tayler Duncan RN  02/08/21 1946

## 2021-02-23 ENCOUNTER — TELEPHONE (OUTPATIENT)
Dept: ENDOCRINOLOGY | Age: 61
End: 2021-02-23

## 2021-02-25 LAB
Lab: NORMAL
Lab: NORMAL
REPORT: NORMAL
REPORT: NORMAL
THIS TEST SENT TO: NORMAL
THIS TEST SENT TO: NORMAL

## 2021-03-05 ENCOUNTER — HOSPITAL ENCOUNTER (INPATIENT)
Age: 61
LOS: 2 days | Discharge: HOME | DRG: 420 | End: 2021-03-07
Attending: EMERGENCY MEDICINE | Admitting: HOSPITALIST
Payer: MEDICAID

## 2021-03-05 ENCOUNTER — APPOINTMENT (OUTPATIENT)
Dept: GENERAL RADIOLOGY | Age: 61
DRG: 420 | End: 2021-03-05
Payer: MEDICAID

## 2021-03-05 ENCOUNTER — APPOINTMENT (OUTPATIENT)
Dept: CT IMAGING | Age: 61
DRG: 420 | End: 2021-03-05
Payer: MEDICAID

## 2021-03-05 DIAGNOSIS — R73.9 HYPERGLYCEMIA WITHOUT KETOSIS: Primary | ICD-10-CM

## 2021-03-05 PROBLEM — G93.40 ACUTE ENCEPHALOPATHY: Status: ACTIVE | Noted: 2021-03-05

## 2021-03-05 LAB
A/G RATIO: 0.9 (ref 1.1–2.2)
ALBUMIN SERPL-MCNC: 3.7 G/DL (ref 3.4–5)
ALP BLD-CCNC: 228 U/L (ref 40–129)
ALT SERPL-CCNC: 44 U/L (ref 10–40)
AMMONIA: 15 UMOL/L (ref 11–51)
AMPHETAMINE SCREEN, URINE: ABNORMAL
ANION GAP SERPL CALCULATED.3IONS-SCNC: 12 MMOL/L (ref 3–16)
AST SERPL-CCNC: 44 U/L (ref 15–37)
BARBITURATE SCREEN URINE: ABNORMAL
BASE EXCESS VENOUS: 0.4 MMOL/L (ref -3–3)
BASOPHILS ABSOLUTE: 0 K/UL (ref 0–0.2)
BASOPHILS RELATIVE PERCENT: 0.6 %
BENZODIAZEPINE SCREEN, URINE: ABNORMAL
BILIRUB SERPL-MCNC: <0.2 MG/DL (ref 0–1)
BILIRUBIN URINE: NEGATIVE
BLOOD, URINE: NEGATIVE
BUN BLDV-MCNC: 14 MG/DL (ref 7–20)
CALCIUM SERPL-MCNC: 9.4 MG/DL (ref 8.3–10.6)
CANNABINOID SCREEN URINE: ABNORMAL
CARBOXYHEMOGLOBIN: 5.7 % (ref 0–1.5)
CHLORIDE BLD-SCNC: 91 MMOL/L (ref 99–110)
CLARITY: CLEAR
CO2: 25 MMOL/L (ref 21–32)
COCAINE METABOLITE SCREEN URINE: ABNORMAL
COLOR: ABNORMAL
CREAT SERPL-MCNC: 1.2 MG/DL (ref 0.6–1.2)
EOSINOPHILS ABSOLUTE: 0 K/UL (ref 0–0.6)
EOSINOPHILS RELATIVE PERCENT: 0.7 %
GFR AFRICAN AMERICAN: 55
GFR NON-AFRICAN AMERICAN: 46
GLOBULIN: 3.9 G/DL
GLUCOSE BLD-MCNC: 207 MG/DL (ref 70–99)
GLUCOSE BLD-MCNC: 315 MG/DL (ref 70–99)
GLUCOSE BLD-MCNC: 523 MG/DL (ref 70–99)
GLUCOSE BLD-MCNC: 969 MG/DL (ref 70–99)
GLUCOSE BLD-MCNC: >600 MG/DL (ref 70–99)
GLUCOSE BLD-MCNC: >600 MG/DL (ref 70–99)
GLUCOSE URINE: >=1000 MG/DL
HCO3 VENOUS: 24.3 MMOL/L (ref 23–29)
HCT VFR BLD CALC: 32.9 % (ref 36–48)
HEMOGLOBIN: 10.1 G/DL (ref 12–16)
KETONES, URINE: NEGATIVE MG/DL
LACTIC ACID: 2.6 MMOL/L (ref 0.4–2)
LEUKOCYTE ESTERASE, URINE: NEGATIVE
LYMPHOCYTES ABSOLUTE: 1.4 K/UL (ref 1–5.1)
LYMPHOCYTES RELATIVE PERCENT: 27.4 %
Lab: ABNORMAL
MCH RBC QN AUTO: 28.3 PG (ref 26–34)
MCHC RBC AUTO-ENTMCNC: 30.5 G/DL (ref 31–36)
MCV RBC AUTO: 92.8 FL (ref 80–100)
METHADONE SCREEN, URINE: ABNORMAL
METHEMOGLOBIN VENOUS: 0.3 %
MICROSCOPIC EXAMINATION: ABNORMAL
MONOCYTES ABSOLUTE: 0.3 K/UL (ref 0–1.3)
MONOCYTES RELATIVE PERCENT: 6.7 %
NEUTROPHILS ABSOLUTE: 3.2 K/UL (ref 1.7–7.7)
NEUTROPHILS RELATIVE PERCENT: 64.6 %
NITRITE, URINE: NEGATIVE
O2 CONTENT, VEN: 15 VOL %
O2 SAT, VEN: 99 %
O2 THERAPY: ABNORMAL
OPIATE SCREEN URINE: ABNORMAL
OXYCODONE URINE: POSITIVE
PCO2, VEN: 36.6 MMHG (ref 40–50)
PDW BLD-RTO: 14.9 % (ref 12.4–15.4)
PERFORMED ON: ABNORMAL
PH UA: 6.5
PH UA: 6.5 (ref 5–8)
PH VENOUS: 7.44 (ref 7.35–7.45)
PHENCYCLIDINE SCREEN URINE: ABNORMAL
PHOSPHORUS: 4.6 MG/DL (ref 2.5–4.9)
PLATELET # BLD: 154 K/UL (ref 135–450)
PMV BLD AUTO: 10.5 FL (ref 5–10.5)
PO2, VEN: 151.2 MMHG (ref 25–40)
POTASSIUM REFLEX MAGNESIUM: 5.4 MMOL/L (ref 3.5–5.1)
PROCALCITONIN: 0.14 NG/ML (ref 0–0.15)
PROPOXYPHENE SCREEN: ABNORMAL
PROTEIN UA: NEGATIVE MG/DL
RBC # BLD: 3.55 M/UL (ref 4–5.2)
SODIUM BLD-SCNC: 128 MMOL/L (ref 136–145)
SPECIFIC GRAVITY UA: <=1.005 (ref 1–1.03)
TCO2 CALC VENOUS: 25 MMOL/L
TOTAL PROTEIN: 7.6 G/DL (ref 6.4–8.2)
URINE TYPE: ABNORMAL
UROBILINOGEN, URINE: 0.2 E.U./DL
WBC # BLD: 4.9 K/UL (ref 4–11)

## 2021-03-05 PROCEDURE — 6370000000 HC RX 637 (ALT 250 FOR IP): Performed by: HOSPITALIST

## 2021-03-05 PROCEDURE — 36415 COLL VENOUS BLD VENIPUNCTURE: CPT

## 2021-03-05 PROCEDURE — 84100 ASSAY OF PHOSPHORUS: CPT

## 2021-03-05 PROCEDURE — G0378 HOSPITAL OBSERVATION PER HR: HCPCS

## 2021-03-05 PROCEDURE — 96374 THER/PROPH/DIAG INJ IV PUSH: CPT

## 2021-03-05 PROCEDURE — 82010 KETONE BODYS QUAN: CPT

## 2021-03-05 PROCEDURE — 2580000003 HC RX 258: Performed by: HOSPITALIST

## 2021-03-05 PROCEDURE — 96361 HYDRATE IV INFUSION ADD-ON: CPT

## 2021-03-05 PROCEDURE — 71045 X-RAY EXAM CHEST 1 VIEW: CPT

## 2021-03-05 PROCEDURE — 6360000002 HC RX W HCPCS: Performed by: HOSPITALIST

## 2021-03-05 PROCEDURE — 82140 ASSAY OF AMMONIA: CPT

## 2021-03-05 PROCEDURE — 85025 COMPLETE CBC W/AUTO DIFF WBC: CPT

## 2021-03-05 PROCEDURE — 99284 EMERGENCY DEPT VISIT MOD MDM: CPT

## 2021-03-05 PROCEDURE — 87040 BLOOD CULTURE FOR BACTERIA: CPT

## 2021-03-05 PROCEDURE — 80307 DRUG TEST PRSMV CHEM ANLYZR: CPT

## 2021-03-05 PROCEDURE — 1200000000 HC SEMI PRIVATE

## 2021-03-05 PROCEDURE — 96375 TX/PRO/DX INJ NEW DRUG ADDON: CPT

## 2021-03-05 PROCEDURE — 83930 ASSAY OF BLOOD OSMOLALITY: CPT

## 2021-03-05 PROCEDURE — 80053 COMPREHEN METABOLIC PANEL: CPT

## 2021-03-05 PROCEDURE — 93005 ELECTROCARDIOGRAM TRACING: CPT | Performed by: HOSPITALIST

## 2021-03-05 PROCEDURE — 84443 ASSAY THYROID STIM HORMONE: CPT

## 2021-03-05 PROCEDURE — 2580000003 HC RX 258: Performed by: STUDENT IN AN ORGANIZED HEALTH CARE EDUCATION/TRAINING PROGRAM

## 2021-03-05 PROCEDURE — 84145 PROCALCITONIN (PCT): CPT

## 2021-03-05 PROCEDURE — 96372 THER/PROPH/DIAG INJ SC/IM: CPT

## 2021-03-05 PROCEDURE — 82947 ASSAY GLUCOSE BLOOD QUANT: CPT

## 2021-03-05 PROCEDURE — 84439 ASSAY OF FREE THYROXINE: CPT

## 2021-03-05 PROCEDURE — C9113 INJ PANTOPRAZOLE SODIUM, VIA: HCPCS | Performed by: HOSPITALIST

## 2021-03-05 PROCEDURE — 83605 ASSAY OF LACTIC ACID: CPT

## 2021-03-05 PROCEDURE — 81003 URINALYSIS AUTO W/O SCOPE: CPT

## 2021-03-05 PROCEDURE — 83036 HEMOGLOBIN GLYCOSYLATED A1C: CPT

## 2021-03-05 PROCEDURE — 70450 CT HEAD/BRAIN W/O DYE: CPT

## 2021-03-05 PROCEDURE — 2580000003 HC RX 258: Performed by: EMERGENCY MEDICINE

## 2021-03-05 PROCEDURE — 82803 BLOOD GASES ANY COMBINATION: CPT

## 2021-03-05 PROCEDURE — 6370000000 HC RX 637 (ALT 250 FOR IP): Performed by: STUDENT IN AN ORGANIZED HEALTH CARE EDUCATION/TRAINING PROGRAM

## 2021-03-05 PROCEDURE — 83935 ASSAY OF URINE OSMOLALITY: CPT

## 2021-03-05 RX ORDER — SODIUM CHLORIDE 0.9 % (FLUSH) 0.9 %
10 SYRINGE (ML) INJECTION PRN
Status: DISCONTINUED | OUTPATIENT
Start: 2021-03-05 | End: 2021-03-07 | Stop reason: HOSPADM

## 2021-03-05 RX ORDER — DEXTROSE MONOHYDRATE 25 G/50ML
12.5 INJECTION, SOLUTION INTRAVENOUS PRN
Status: DISCONTINUED | OUTPATIENT
Start: 2021-03-05 | End: 2021-03-07 | Stop reason: HOSPADM

## 2021-03-05 RX ORDER — SENNA PLUS 8.6 MG/1
1 TABLET ORAL DAILY PRN
Status: DISCONTINUED | OUTPATIENT
Start: 2021-03-05 | End: 2021-03-07 | Stop reason: HOSPADM

## 2021-03-05 RX ORDER — ACETAMINOPHEN 325 MG/1
650 TABLET ORAL EVERY 6 HOURS PRN
Status: DISCONTINUED | OUTPATIENT
Start: 2021-03-05 | End: 2021-03-07 | Stop reason: HOSPADM

## 2021-03-05 RX ORDER — PROMETHAZINE HYDROCHLORIDE 25 MG/1
12.5 TABLET ORAL EVERY 6 HOURS PRN
Status: DISCONTINUED | OUTPATIENT
Start: 2021-03-05 | End: 2021-03-07 | Stop reason: HOSPADM

## 2021-03-05 RX ORDER — NICOTINE POLACRILEX 4 MG
15 LOZENGE BUCCAL PRN
Status: DISCONTINUED | OUTPATIENT
Start: 2021-03-05 | End: 2021-03-07 | Stop reason: HOSPADM

## 2021-03-05 RX ORDER — ATORVASTATIN CALCIUM 10 MG/1
10 TABLET, FILM COATED ORAL DAILY
Status: DISCONTINUED | OUTPATIENT
Start: 2021-03-05 | End: 2021-03-07 | Stop reason: HOSPADM

## 2021-03-05 RX ORDER — INSULIN GLARGINE 100 [IU]/ML
10 INJECTION, SOLUTION SUBCUTANEOUS 2 TIMES DAILY
Status: DISCONTINUED | OUTPATIENT
Start: 2021-03-05 | End: 2021-03-07 | Stop reason: HOSPADM

## 2021-03-05 RX ORDER — ONDANSETRON 2 MG/ML
4 INJECTION INTRAMUSCULAR; INTRAVENOUS EVERY 6 HOURS PRN
Status: DISCONTINUED | OUTPATIENT
Start: 2021-03-05 | End: 2021-03-07 | Stop reason: HOSPADM

## 2021-03-05 RX ORDER — ACETAMINOPHEN 650 MG/1
650 SUPPOSITORY RECTAL EVERY 6 HOURS PRN
Status: DISCONTINUED | OUTPATIENT
Start: 2021-03-05 | End: 2021-03-07 | Stop reason: HOSPADM

## 2021-03-05 RX ORDER — TRAZODONE HYDROCHLORIDE 50 MG/1
100 TABLET ORAL NIGHTLY
Status: DISCONTINUED | OUTPATIENT
Start: 2021-03-05 | End: 2021-03-07 | Stop reason: HOSPADM

## 2021-03-05 RX ORDER — NITROGLYCERIN 0.4 MG/1
0.4 TABLET SUBLINGUAL EVERY 5 MIN PRN
Status: DISCONTINUED | OUTPATIENT
Start: 2021-03-05 | End: 2021-03-07 | Stop reason: HOSPADM

## 2021-03-05 RX ORDER — PANTOPRAZOLE SODIUM 40 MG/1
40 TABLET, DELAYED RELEASE ORAL
Status: DISCONTINUED | OUTPATIENT
Start: 2021-03-06 | End: 2021-03-07 | Stop reason: HOSPADM

## 2021-03-05 RX ORDER — 0.9 % SODIUM CHLORIDE 0.9 %
1000 INTRAVENOUS SOLUTION INTRAVENOUS ONCE
Status: COMPLETED | OUTPATIENT
Start: 2021-03-05 | End: 2021-03-05

## 2021-03-05 RX ORDER — SODIUM CHLORIDE 9 MG/ML
INJECTION, SOLUTION INTRAVENOUS CONTINUOUS
Status: DISCONTINUED | OUTPATIENT
Start: 2021-03-05 | End: 2021-03-05

## 2021-03-05 RX ORDER — DEXTROSE MONOHYDRATE 50 MG/ML
100 INJECTION, SOLUTION INTRAVENOUS PRN
Status: DISCONTINUED | OUTPATIENT
Start: 2021-03-05 | End: 2021-03-07 | Stop reason: HOSPADM

## 2021-03-05 RX ORDER — PANTOPRAZOLE SODIUM 40 MG/10ML
40 INJECTION, POWDER, LYOPHILIZED, FOR SOLUTION INTRAVENOUS DAILY
Status: DISCONTINUED | OUTPATIENT
Start: 2021-03-05 | End: 2021-03-05

## 2021-03-05 RX ORDER — ASPIRIN 81 MG/1
81 TABLET ORAL DAILY
Status: DISCONTINUED | OUTPATIENT
Start: 2021-03-05 | End: 2021-03-07 | Stop reason: HOSPADM

## 2021-03-05 RX ORDER — SODIUM CHLORIDE, SODIUM LACTATE, POTASSIUM CHLORIDE, CALCIUM CHLORIDE 600; 310; 30; 20 MG/100ML; MG/100ML; MG/100ML; MG/100ML
INJECTION, SOLUTION INTRAVENOUS CONTINUOUS
Status: DISCONTINUED | OUTPATIENT
Start: 2021-03-05 | End: 2021-03-06

## 2021-03-05 RX ORDER — MAGNESIUM SULFATE IN WATER 40 MG/ML
2000 INJECTION, SOLUTION INTRAVENOUS PRN
Status: DISCONTINUED | OUTPATIENT
Start: 2021-03-05 | End: 2021-03-07 | Stop reason: HOSPADM

## 2021-03-05 RX ORDER — POTASSIUM CHLORIDE 7.45 MG/ML
10 INJECTION INTRAVENOUS PRN
Status: DISCONTINUED | OUTPATIENT
Start: 2021-03-05 | End: 2021-03-07 | Stop reason: HOSPADM

## 2021-03-05 RX ORDER — POTASSIUM CHLORIDE 20 MEQ/1
40 TABLET, EXTENDED RELEASE ORAL PRN
Status: DISCONTINUED | OUTPATIENT
Start: 2021-03-05 | End: 2021-03-07 | Stop reason: HOSPADM

## 2021-03-05 RX ORDER — LISINOPRIL 10 MG/1
10 TABLET ORAL DAILY
Status: DISCONTINUED | OUTPATIENT
Start: 2021-03-05 | End: 2021-03-07 | Stop reason: HOSPADM

## 2021-03-05 RX ORDER — LACTOBACILLUS RHAMNOSUS GG 10B CELL
2 CAPSULE ORAL 2 TIMES DAILY WITH MEALS
Status: DISCONTINUED | OUTPATIENT
Start: 2021-03-05 | End: 2021-03-07 | Stop reason: HOSPADM

## 2021-03-05 RX ORDER — PALIPERIDONE 3 MG/1
3 TABLET, EXTENDED RELEASE ORAL DAILY
Status: DISCONTINUED | OUTPATIENT
Start: 2021-03-05 | End: 2021-03-07 | Stop reason: HOSPADM

## 2021-03-05 RX ORDER — SODIUM CHLORIDE 0.9 % (FLUSH) 0.9 %
10 SYRINGE (ML) INJECTION EVERY 12 HOURS SCHEDULED
Status: DISCONTINUED | OUTPATIENT
Start: 2021-03-05 | End: 2021-03-07 | Stop reason: HOSPADM

## 2021-03-05 RX ADMIN — ASPIRIN 81 MG: 81 TABLET, COATED ORAL at 22:22

## 2021-03-05 RX ADMIN — INSULIN HUMAN 10 UNITS: 100 INJECTION, SOLUTION PARENTERAL at 20:17

## 2021-03-05 RX ADMIN — SODIUM CHLORIDE 1000 ML: 9 INJECTION, SOLUTION INTRAVENOUS at 22:21

## 2021-03-05 RX ADMIN — Medication 2 CAPSULE: at 22:21

## 2021-03-05 RX ADMIN — INSULIN HUMAN 8 UNITS: 100 INJECTION, SOLUTION PARENTERAL at 18:43

## 2021-03-05 RX ADMIN — PANTOPRAZOLE SODIUM 40 MG: 40 INJECTION, POWDER, FOR SOLUTION INTRAVENOUS at 22:19

## 2021-03-05 RX ADMIN — INSULIN LISPRO 4 UNITS: 100 INJECTION, SOLUTION INTRAVENOUS; SUBCUTANEOUS at 22:19

## 2021-03-05 RX ADMIN — TICAGRELOR 90 MG: 90 TABLET ORAL at 22:21

## 2021-03-05 RX ADMIN — SODIUM CHLORIDE 1000 ML: 9 INJECTION, SOLUTION INTRAVENOUS at 20:03

## 2021-03-05 RX ADMIN — SODIUM CHLORIDE, POTASSIUM CHLORIDE, SODIUM LACTATE AND CALCIUM CHLORIDE: 600; 310; 30; 20 INJECTION, SOLUTION INTRAVENOUS at 22:19

## 2021-03-05 RX ADMIN — TRAZODONE HYDROCHLORIDE 100 MG: 50 TABLET ORAL at 22:21

## 2021-03-05 RX ADMIN — Medication 10 ML: at 22:21

## 2021-03-05 RX ADMIN — INSULIN GLARGINE 10 UNITS: 100 INJECTION, SOLUTION SUBCUTANEOUS at 22:19

## 2021-03-05 RX ADMIN — SODIUM CHLORIDE 1000 ML: 9 INJECTION, SOLUTION INTRAVENOUS at 17:39

## 2021-03-05 RX ADMIN — ATORVASTATIN CALCIUM 10 MG: 10 TABLET, FILM COATED ORAL at 22:22

## 2021-03-05 RX ADMIN — SODIUM CHLORIDE, POTASSIUM CHLORIDE, SODIUM LACTATE AND CALCIUM CHLORIDE: 600; 310; 30; 20 INJECTION, SOLUTION INTRAVENOUS at 22:21

## 2021-03-05 ASSESSMENT — ENCOUNTER SYMPTOMS
BACK PAIN: 1
DIARRHEA: 0
VOMITING: 0
ABDOMINAL PAIN: 0
NAUSEA: 0
SHORTNESS OF BREATH: 0
CHEST TIGHTNESS: 0

## 2021-03-05 ASSESSMENT — PAIN SCALES - GENERAL: PAINLEVEL_OUTOF10: 9

## 2021-03-05 NOTE — ED PROVIDER NOTES
I independently performed a history and physical on Yoel Hackett. All diagnostic, treatment, and disposition decisions were made by myself in conjunction with the resident below    For further details of [facility] emergency department encounter, please see Latrice Moncada DO's note for full details of patient's visit. Patient presents to the emergency department complaining of hyperglycemia. Patient has a history of diabetes mellitus. She states that she has been taking her medications but has noted increasing blood sugars. Upon arrival, blood sugar greater than 600. Physical exam: General: Acute distress. Head: Normal cephalic/atraumatic. Heart: Regular rate and rhythm peer normal S1-S2. Lungs were clear to auscultation bilaterally. No wheezes, rales, rhonchi. Abdomen: Soft. Nontender nondistended. No rebound, guarding. Neurologic: Cranial nerves II through XII intact. Muscle strength 5/5. Sensation within normal limits. LABS  I have reviewed all labs for this visit.    Results for orders placed or performed during the hospital encounter of 03/05/21   CBC Auto Differential   Result Value Ref Range    WBC 4.9 4.0 - 11.0 K/uL    RBC 3.55 (L) 4.00 - 5.20 M/uL    Hemoglobin 10.1 (L) 12.0 - 16.0 g/dL    Hematocrit 32.9 (L) 36.0 - 48.0 %    MCV 92.8 80.0 - 100.0 fL    MCH 28.3 26.0 - 34.0 pg    MCHC 30.5 (L) 31.0 - 36.0 g/dL    RDW 14.9 12.4 - 15.4 %    Platelets 544 610 - 646 K/uL    MPV 10.5 5.0 - 10.5 fL    Neutrophils % 64.6 %    Lymphocytes % 27.4 %    Monocytes % 6.7 %    Eosinophils % 0.7 %    Basophils % 0.6 %    Neutrophils Absolute 3.2 1.7 - 7.7 K/uL    Lymphocytes Absolute 1.4 1.0 - 5.1 K/uL    Monocytes Absolute 0.3 0.0 - 1.3 K/uL    Eosinophils Absolute 0.0 0.0 - 0.6 K/uL    Basophils Absolute 0.0 0.0 - 0.2 K/uL   Comprehensive Metabolic Panel w/ Reflex to MG   Result Value Ref Range    Sodium 128 (L) 136 - 145 mmol/L    Potassium reflex Magnesium 5.4 (H) 3.5 - 5.1 mmol/L    Chloride 91 (L) 99 - 110 mmol/L    CO2 25 21 - 32 mmol/L    Anion Gap 12 3 - 16    Glucose 969 (HH) 70 - 99 mg/dL    BUN 14 7 - 20 mg/dL    CREATININE 1.2 0.6 - 1.2 mg/dL    GFR Non- 46 (A) >60    GFR  55 (A) >60    Calcium 9.4 8.3 - 10.6 mg/dL    Total Protein 7.6 6.4 - 8.2 g/dL    Albumin 3.7 3.4 - 5.0 g/dL    Albumin/Globulin Ratio 0.9 (L) 1.1 - 2.2    Total Bilirubin <0.2 0.0 - 1.0 mg/dL    Alkaline Phosphatase 228 (H) 40 - 129 U/L    ALT 44 (H) 10 - 40 U/L    AST 44 (H) 15 - 37 U/L    Globulin 3.9 g/dL   Urinalysis, reflex to microscopic   Result Value Ref Range    Color, UA Straw Straw/Yellow    Clarity, UA Clear Clear    Glucose, Ur >=1000 (A) Negative mg/dL    Bilirubin Urine Negative Negative    Ketones, Urine Negative Negative mg/dL    Specific Gravity, UA <=1.005 1.005 - 1.030    Blood, Urine Negative Negative    pH, UA 6.5 5.0 - 8.0    Protein, UA Negative Negative mg/dL    Urobilinogen, Urine 0.2 <2.0 E.U./dL    Nitrite, Urine Negative Negative    Leukocyte Esterase, Urine Negative Negative    Microscopic Examination Not Indicated     Urine Type NotGiven    Blood Gas, Venous   Result Value Ref Range    pH, Kyle 7.440 7.350 - 7.450    pCO2, Kyle 36.6 (L) 40.0 - 50.0 mmHg    pO2, Kyle 151.2 (H) 25.0 - 40.0 mmHg    HCO3, Venous 24.3 23.0 - 29.0 mmol/L    Base Excess, Kyle 0.4 -3.0 - 3.0 mmol/L    O2 Sat, Kyle 99 Not Established %    Carboxyhemoglobin 5.7 (H) 0.0 - 1.5 %    MetHgb, Kyle 0.3 <1.5 %    TC02 (Calc), Kyle 25 Not Established mmol/L    O2 Content, Kyle 15 Not Established VOL %    O2 Therapy Unknown    Urine Drug Screen   Result Value Ref Range    Amphetamine Screen, Urine Neg Negative <1000ng/mL    Barbiturate Screen, Ur Neg Negative <200 ng/mL    Benzodiazepine Screen, Urine Neg Negative <200 ng/mL    Cannabinoid Scrn, Ur Neg Negative <50 ng/mL    Cocaine Metabolite Screen, Urine Neg Negative <300 ng/mL    Opiate Scrn, Ur Neg Negative <300 ng/mL    PCP Screen, Urine Neg Negative <25 ng/mL    Methadone Screen, Urine Neg Negative <300 ng/mL    Propoxyphene Scrn, Ur Neg Negative <300 ng/mL    Oxycodone Urine POSITIVE (A) Negative <100 ng/ml    pH, UA 6.5     Drug Screen Comment: see below    POCT Glucose   Result Value Ref Range    POC Glucose >600 (AA) 70 - 99 mg/dl    Performed on ACCU-CHEK        EKG:    Sinus bradycardia with a rate of 59. Normal axis. Normal intervals and durations. No change from previous EKG on 2/8/2021. Rechecks: Physical assessment performed. 1745: BP stable. Pulse 66.    1751: Glucose 969.    1956: Accu-Chek glucose: Greater than 600.    2100: Glucose 523. Patient will be admitted to hospitalist for further evaluation and treatment. Critical care 35 minutes was complete outpatient in addition to and excluding any procedures noted secondary to concerns for hyperglycemia. ED COURSE/MDM  Patient seen and evaluated. Old records reviewed. Labs and imaging reviewed and results discussed with patient. Patient was given insulin and NS bolus in the ED with good symptomatic relief. Patient was reassessed as noted above . Patient's glucose greater than 900. She did not appear to be acidotic. Mild hyponatremia was noted. Likely secondary to pseudohyponatremia/ hyperglycemia. Patient will be admitted to the hospitalist after further evaluation and treatment. . Plan of care discussed with patient and family. Patient and family in agreement with plan. Patient was given scripts for the following medications. I counseled patient how to take these medications. New Prescriptions    No medications on file       CLINICAL IMPRESSION  1. Hyperglycemia without ketosis        Blood pressure (!) 163/94, pulse 66, temperature (P) 97.2 °F (36.2 °C), resp. rate 23, height 5' 3\" (1.6 m), weight 105 lb (47.6 kg), SpO2 98 %, not currently breastfeeding. DISPOSITION  Marcia Gan was admitted in stable condition.         Patricia Kirby Earnest Torres, DO  03/05/21 [id number] [facility], DO  03/05/21 2145       Camilla Adelaida, DO  03/05/21 2146

## 2021-03-05 NOTE — ED NOTES
Positive fall risk. Fall band placed on patient at triage.      FSBS taken at triage - \"HIGH\" reading     June Lora RN  03/05/21 0073

## 2021-03-05 NOTE — ED PROVIDER NOTES
Emergency Department Provider Note  Location: [address] [place] [address]  ED  3/5/2021     Patient Identification  Candelaria Bergeron is a 63 y.o. female    Chief Complaint  Hyperglycemia (pt reports blood sugars \"being over 600 today\")      Mode of Arrival  private car    HPI  (History provided by patient)  This is a 63 y.o. female with a PMH significant for HTN, HLD, CAD status post PCI, DM 2, breast CA, anxiety, and depression presented today for elevated glucose. Patient has recently been discharged from SNF following an admission in the hospital in January of this year. Patient all of a sudden felt fatigued and in poor health today. She tested her blood sugar and noted that it was over 600, and thus came to the ED for management. She states that she's also felt a little confused and has been urinating more frequently. Has otherwise not been ill and is unaware of being around anybody that has been ill. ROS  Review of Systems   Constitutional: Positive for chills, fatigue and fever. HENT: Negative. Respiratory: Negative for chest tightness and shortness of breath. Cardiovascular: Negative for chest pain and palpitations. Gastrointestinal: Negative for abdominal pain, diarrhea, nausea and vomiting. Genitourinary: Positive for frequency. Negative for difficulty urinating and flank pain. Musculoskeletal: Positive for back pain. Neurological: Negative for dizziness, weakness, light-headedness, numbness and headaches. Psychiatric/Behavioral: Positive for confusion. I have reviewed the following nursing documentation:  Allergies:    Allergies   Allergen Reactions    Morphine Anaphylaxis and Hives     feels like throat is closing    Penicillins Hives and Swelling    Codeine Hives and Rash    Penicillin G Rash       Past medical history:  has a past medical history of Abnormal brain MRI (7/20/2017), Acute bilateral low back pain without sciatica (11/2/2016), [facility]) [facility] (7/5/2017), Arthritis, Bipolar disorder ([facility] (10/18/2008), CAD (coronary artery disease), Cancer ([facility] (2015), Carotid stenosis, bilateral:<50%:per US 7/2016 (7/15/2016), Carpal tunnel syndrome (10/18/2008), Cervical cancer screening (2014), Coronary artery disease of native artery of native heart with stable angina pectoris ([facility] (6/9/2020), DDD (degenerative disc disease), lumbar (7/18/2018), Depression, Depression/anxiety (7/5/2017), Depression/anxiety, Diabetes mellitus ([facility] Gout, History of mammogram (10/28/2016;8/14/17), Hyperlipidemia, Hypertension, 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 Past surgical history:  has a past surgical history that includes Kidney removal; Hysterectomy; Breast lumpectomy (2015); Tubal ligation; other surgical history (Right); Cardiac catheterization (06/08/2020); Upper gastrointestinal endoscopy (N/A, 1/29/2021); Colonoscopy (N/A, 2/1/2021); and CT BIOPSY BONE MARROW (2/3/2021). Home medications:   Prior to Admission medications    Medication Sig Start Date End Date Taking?  Authorizing Provider   lactobacillus (CULTURELLE) capsule Take 2 capsules by mouth 2 times daily (with meals) 2/5/21   Mary Sterling MD   paliperidone (INVEGA) 6 MG extended release tablet Take 1 tablet by mouth every morning 2/4/21   Mary Sterling MD   ticagrelor (BRILINTA) 90 MG TABS tablet Take 1 tablet by mouth 2 times daily 2/3/21   David Vickers MD   TRUE METRIX BLOOD GLUCOSE TEST strip USE AS DIRECTED TO TEST 4 TIMES A DAY 1/7/21   Tripp Elliosn Esther Ramos MD   JARDIANCE 25 MG tablet TAKE 1 TABLET BY MOUTH EVERY DAY IN THE MORNING 12/3/20   Historical Provider, MD   insulin glargine (LANTUS SOLOSTAR) 100 UNIT/ML injection pen Inject 25 Units into the skin every morning  Patient taking differently: Inject 26 Units into the skin every morning  10/8/20   Cris Gatica MD   Liraglutide (VICTOZA) 18 MG/3ML SOPN SC injection Inject 1.8 mg into the skin daily 8/6/20   Cha Jones MD   nitroGLYCERIN (NITROSTAT) 0.4 MG SL tablet up to max of 3 total doses. If no relief after 1 dose, call 911. 7/11/20   Tamika Huang MD   Insulin Pen Needle (UNIFINE PENTIPS) 32G X 4 MM MISC USE AS DIRECTED 3 TIMES A DAY 6/30/20   Cha Jones MD   simvastatin (ZOCOR) 20 MG tablet Take 20 mg by mouth nightly    Historical Provider, MD   cetirizine (ZYRTEC) 10 MG tablet Take 10 mg by mouth daily    Historical Provider, MD   lisinopril (PRINIVIL;ZESTRIL) 10 MG tablet Take 10 mg by mouth daily    Historical Provider, MD   Empagliflozin-metFORMIN HCl 12.5-1000 MG TABS Take 2 tablets by mouth daily 6/25/20   Cha Jones MD   Blood Pressure Monitor GINA Check BP at home once or twice a day 5/1/20   Cha Jones MD   Insulin Syringe-Needle U-100 31G X 5/16\" 0.3 ML MISC USE 3 TIMES A DAY BEFORE MEALS 1/31/20   Cha Jones MD   calcium carbonate-vitamin D (CALTRATE) 600-400 MG-UNIT TABS per tab 1 tablet daily  12/30/19   Historical Provider, MD   glucagon 1 MG injection Inject 1 mg into the muscle See Admin Instructions Follow package directions for low blood sugar.  2/19/19   Cha Jones MD   aspirin 81 MG tablet Take 81 mg by mouth daily    Historical Provider, MD   gabapentin (NEURONTIN) 600 MG tablet Take 600 mg by mouth 3 times daily    Historical Provider, MD   traZODone (DESYREL) 100 MG tablet Take 100 mg by mouth nightly    Historical Provider, MD       Social history:  reports that she quit smoking about 6 years ago. Her smoking use included cigarettes and cigars. She has a 10.00 pack-year smoking history. She has never used smokeless tobacco. She reports that she does not drink alcohol or use drugs. Family history:    Family History   Problem Relation Age of Onset    Cancer Mother         breast    Cancer Father     Heart Failure Neg Hx     High Cholesterol Neg Hx     Hypertension Neg Hx     Migraines Neg Hx     Rashes/Skin Problems Neg Hx     Seizures Neg Hx     Stroke Neg Hx     Thyroid Disease Neg Hx     Diabetes Neg Hx        Exam  ED Triage Vitals [03/05/21 1615]   BP Temp Temp src Pulse Resp SpO2 Height Weight   (!) 148/51 97.2 °F (36.2 °C) -- 73 16 99 % 5' 3\" (1.6 m) 105 lb (47.6 kg)     Physical Exam  Vitals signs reviewed. Constitutional:       General: She is not in acute distress. Appearance: Normal appearance. She is normal weight. She is not ill-appearing. HENT:      Head: Normocephalic and atraumatic. Eyes:      Conjunctiva/sclera: Conjunctivae normal.      Comments: Pinpoint pupils. Cardiovascular:      Rate and Rhythm: Normal rate and regular rhythm. Heart sounds: No murmur. No friction rub. No gallop. Pulmonary:      Effort: Pulmonary effort is normal.      Breath sounds: No wheezing, rhonchi or rales. Abdominal:      General: Abdomen is flat. Bowel sounds are normal.      Palpations: Abdomen is soft. Tenderness: There is no guarding or rebound. Musculoskeletal:      Right lower leg: No edema. Left lower leg: No edema. Skin:     General: Skin is warm and dry. Neurological:      General: No focal deficit present. Mental Status: She is alert and oriented to person, place, and time. Cranial Nerves: No cranial nerve deficit. Motor: No weakness. Comments: Slow but appropriate speech.    Psychiatric:         Mood and Affect: Mood normal.         Behavior: Behavior normal.            MDM/ED Course  ED Medication Orders (From admission, onward)    Start Ordered     Status Ordering Provider    03/05/21 1630 03/05/21 1628  0.9 % sodium chloride bolus  ONCE      Last MAR action: New Bag - by Olman Valdes on 03/05/21 at 1739 BELEM BOYD          EKG  Not obtained this visit. Radiology  No results found.       Labs  Results for orders placed or performed during the hospital encounter of 03/05/21   CBC Auto Differential   Result Value Ref Range    WBC 4.9 4.0 - 11.0 K/uL    RBC 3.55 (L) 4.00 - 5.20 M/uL    Hemoglobin 10.1 (L) 12.0 - 16.0 g/dL    Hematocrit 32.9 (L) 36.0 - 48.0 %    MCV 92.8 80.0 - 100.0 fL    MCH 28.3 26.0 - 34.0 pg    MCHC 30.5 (L) 31.0 - 36.0 g/dL    RDW 14.9 12.4 - 15.4 %    Platelets 116 287 - 035 K/uL    MPV 10.5 5.0 - 10.5 fL    Neutrophils % 64.6 %    Lymphocytes % 27.4 %    Monocytes % 6.7 %    Eosinophils % 0.7 %    Basophils % 0.6 %    Neutrophils Absolute 3.2 1.7 - 7.7 K/uL    Lymphocytes Absolute 1.4 1.0 - 5.1 K/uL    Monocytes Absolute 0.3 0.0 - 1.3 K/uL    Eosinophils Absolute 0.0 0.0 - 0.6 K/uL    Basophils Absolute 0.0 0.0 - 0.2 K/uL   Comprehensive Metabolic Panel w/ Reflex to MG   Result Value Ref Range    Sodium 128 (L) 136 - 145 mmol/L    Potassium reflex Magnesium 5.4 (H) 3.5 - 5.1 mmol/L    Chloride 91 (L) 99 - 110 mmol/L    CO2 25 21 - 32 mmol/L    Anion Gap 12 3 - 16    Glucose 969 (HH) 70 - 99 mg/dL    BUN 14 7 - 20 mg/dL    CREATININE 1.2 0.6 - 1.2 mg/dL    GFR Non- 46 (A) >60    GFR  55 (A) >60    Calcium 9.4 8.3 - 10.6 mg/dL    Total Protein 7.6 6.4 - 8.2 g/dL    Albumin 3.7 3.4 - 5.0 g/dL    Albumin/Globulin Ratio 0.9 (L) 1.1 - 2.2    Total Bilirubin <0.2 0.0 - 1.0 mg/dL    Alkaline Phosphatase 228 (H) 40 - 129 U/L    ALT 44 (H) 10 - 40 U/L    AST 44 (H) 15 - 37 U/L    Globulin 3.9 g/dL   Urinalysis, reflex to microscopic   Result Value Ref Range    Color, UA Straw Straw/Yellow    Clarity, UA Clear Clear    Glucose, Ur >=1000 (A) Negative mg/dL    Bilirubin Urine Negative also given 8 units of IV insulin while here. - Discussed patient with hospitalist, and we will admit patient for management of severe hyperglycemia. Clinical Impression:  1. Hyperglycemia without ketosis          Disposition:  Admit to CCU/ICU in stable condition. Blood pressure (!) 163/94, pulse 66, temperature 97.2 °F (36.2 °C), resp. rate 23, height 5' 3\" (1.6 m), weight 105 lb (47.6 kg), SpO2 99 %, not currently breastfeeding. Patient was given scripts for the following medications. I counseled patient how to take these medications. New Prescriptions    No medications on file       Disposition referral (if applicable):  No follow-up provider specified. This chart was generated in part by using Dragon Dictation system and may contain errors related to that system including errors in grammar, punctuation, and spelling, as well as words and phrases that may be inappropriate. If there are any questions or concerns please feel free to contact the dictating provider for clarification.      Connor Laird DO - PGY-2  [facility]  (Available via perfect serve.)       Connor Laird DO  Resident  03/05/21 8010

## 2021-03-05 NOTE — ED NOTES

## 2021-03-05 NOTE — ED NOTES
Pt state that she just got out of rehab 2 days ago. Pt in today with hyperglycemia. Pt awake alert resp easy lungs CTA. Pt state that she is taking her medication at home however cont with increase glucose. Pt orient x4.      Jeyson Sawyer RN  03/05/21 0434

## 2021-03-06 LAB
ALBUMIN SERPL-MCNC: 2.9 G/DL (ref 3.4–5)
ALBUMIN SERPL-MCNC: 3.2 G/DL (ref 3.4–5)
ALBUMIN SERPL-MCNC: 3.6 G/DL (ref 3.4–5)
ANION GAP SERPL CALCULATED.3IONS-SCNC: 8 MMOL/L (ref 3–16)
ANION GAP SERPL CALCULATED.3IONS-SCNC: 9 MMOL/L (ref 3–16)
ANION GAP SERPL CALCULATED.3IONS-SCNC: 9 MMOL/L (ref 3–16)
BETA-HYDROXYBUTYRATE: 0.23 MMOL/L (ref 0–0.27)
BUN BLDV-MCNC: 10 MG/DL (ref 7–20)
BUN BLDV-MCNC: 11 MG/DL (ref 7–20)
BUN BLDV-MCNC: 14 MG/DL (ref 7–20)
CALCIUM SERPL-MCNC: 8.7 MG/DL (ref 8.3–10.6)
CALCIUM SERPL-MCNC: 9.1 MG/DL (ref 8.3–10.6)
CALCIUM SERPL-MCNC: 9.1 MG/DL (ref 8.3–10.6)
CHLORIDE BLD-SCNC: 103 MMOL/L (ref 99–110)
CHLORIDE BLD-SCNC: 105 MMOL/L (ref 99–110)
CHLORIDE BLD-SCNC: 106 MMOL/L (ref 99–110)
CO2: 23 MMOL/L (ref 21–32)
CO2: 24 MMOL/L (ref 21–32)
CO2: 26 MMOL/L (ref 21–32)
CREAT SERPL-MCNC: 0.9 MG/DL (ref 0.6–1.2)
CREAT SERPL-MCNC: 1 MG/DL (ref 0.6–1.2)
CREAT SERPL-MCNC: 1.1 MG/DL (ref 0.6–1.2)
EKG ATRIAL RATE: 59 BPM
EKG DIAGNOSIS: NORMAL
EKG P AXIS: 23 DEGREES
EKG P-R INTERVAL: 166 MS
EKG Q-T INTERVAL: 438 MS
EKG QRS DURATION: 78 MS
EKG QTC CALCULATION (BAZETT): 433 MS
EKG R AXIS: -22 DEGREES
EKG T AXIS: 53 DEGREES
EKG VENTRICULAR RATE: 59 BPM
ESTIMATED AVERAGE GLUCOSE: 217.3 MG/DL
ESTIMATED AVERAGE GLUCOSE: 220.2 MG/DL
GFR AFRICAN AMERICAN: >60
GFR NON-AFRICAN AMERICAN: 50
GFR NON-AFRICAN AMERICAN: 56
GFR NON-AFRICAN AMERICAN: >60
GLUCOSE BLD-MCNC: 128 MG/DL (ref 70–99)
GLUCOSE BLD-MCNC: 176 MG/DL (ref 70–99)
GLUCOSE BLD-MCNC: 188 MG/DL (ref 70–99)
GLUCOSE BLD-MCNC: 224 MG/DL (ref 70–99)
GLUCOSE BLD-MCNC: 232 MG/DL (ref 70–99)
GLUCOSE BLD-MCNC: 281 MG/DL (ref 70–99)
GLUCOSE BLD-MCNC: 287 MG/DL (ref 70–99)
GLUCOSE BLD-MCNC: 406 MG/DL (ref 70–99)
GLUCOSE BLD-MCNC: 44 MG/DL (ref 70–99)
GLUCOSE BLD-MCNC: 79 MG/DL (ref 70–99)
GLUCOSE BLD-MCNC: 90 MG/DL (ref 70–99)
HBA1C MFR BLD: 9.2 %
HBA1C MFR BLD: 9.3 %
OSMOLALITY URINE: 547 MOSM/KG (ref 390–1070)
OSMOLALITY: 303 MOSM/KG (ref 280–301)
PERFORMED ON: ABNORMAL
PERFORMED ON: NORMAL
PERFORMED ON: NORMAL
PHOSPHORUS: 2.7 MG/DL (ref 2.5–4.9)
PHOSPHORUS: 2.9 MG/DL (ref 2.5–4.9)
PHOSPHORUS: 3.2 MG/DL (ref 2.5–4.9)
POTASSIUM SERPL-SCNC: 3.9 MMOL/L (ref 3.5–5.1)
POTASSIUM SERPL-SCNC: 3.9 MMOL/L (ref 3.5–5.1)
POTASSIUM SERPL-SCNC: 4 MMOL/L (ref 3.5–5.1)
SODIUM BLD-SCNC: 137 MMOL/L (ref 136–145)
SODIUM BLD-SCNC: 138 MMOL/L (ref 136–145)
SODIUM BLD-SCNC: 138 MMOL/L (ref 136–145)
T4 FREE: 0.9 NG/DL (ref 0.9–1.8)
TSH SERPL DL<=0.05 MIU/L-ACNC: 0.57 UIU/ML (ref 0.27–4.2)

## 2021-03-06 PROCEDURE — G0378 HOSPITAL OBSERVATION PER HR: HCPCS

## 2021-03-06 PROCEDURE — 6370000000 HC RX 637 (ALT 250 FOR IP): Performed by: HOSPITALIST

## 2021-03-06 PROCEDURE — 96361 HYDRATE IV INFUSION ADD-ON: CPT

## 2021-03-06 PROCEDURE — 80069 RENAL FUNCTION PANEL: CPT

## 2021-03-06 PROCEDURE — 97535 SELF CARE MNGMENT TRAINING: CPT

## 2021-03-06 PROCEDURE — 97116 GAIT TRAINING THERAPY: CPT

## 2021-03-06 PROCEDURE — 6360000002 HC RX W HCPCS: Performed by: NURSE PRACTITIONER

## 2021-03-06 PROCEDURE — 2580000003 HC RX 258: Performed by: HOSPITALIST

## 2021-03-06 PROCEDURE — 97162 PT EVAL MOD COMPLEX 30 MIN: CPT

## 2021-03-06 PROCEDURE — 97165 OT EVAL LOW COMPLEX 30 MIN: CPT

## 2021-03-06 PROCEDURE — 1200000000 HC SEMI PRIVATE

## 2021-03-06 PROCEDURE — 36415 COLL VENOUS BLD VENIPUNCTURE: CPT

## 2021-03-06 RX ADMIN — Medication 2 CAPSULE: at 17:53

## 2021-03-06 RX ADMIN — Medication 2 CAPSULE: at 10:15

## 2021-03-06 RX ADMIN — INSULIN GLARGINE 10 UNITS: 100 INJECTION, SOLUTION SUBCUTANEOUS at 20:22

## 2021-03-06 RX ADMIN — PALIPERIDONE 3 MG: 3 TABLET, EXTENDED RELEASE ORAL at 10:15

## 2021-03-06 RX ADMIN — ATORVASTATIN CALCIUM 10 MG: 10 TABLET, FILM COATED ORAL at 10:18

## 2021-03-06 RX ADMIN — INSULIN LISPRO 2 UNITS: 100 INJECTION, SOLUTION INTRAVENOUS; SUBCUTANEOUS at 09:49

## 2021-03-06 RX ADMIN — TRAZODONE HYDROCHLORIDE 100 MG: 50 TABLET ORAL at 20:20

## 2021-03-06 RX ADMIN — INSULIN LISPRO 6 UNITS: 100 INJECTION, SOLUTION INTRAVENOUS; SUBCUTANEOUS at 13:21

## 2021-03-06 RX ADMIN — INSULIN LISPRO 12 UNITS: 100 INJECTION, SOLUTION INTRAVENOUS; SUBCUTANEOUS at 04:17

## 2021-03-06 RX ADMIN — ENOXAPARIN SODIUM 40 MG: 40 INJECTION SUBCUTANEOUS at 17:53

## 2021-03-06 RX ADMIN — ASPIRIN 81 MG: 81 TABLET, COATED ORAL at 10:15

## 2021-03-06 RX ADMIN — TICAGRELOR 90 MG: 90 TABLET ORAL at 20:21

## 2021-03-06 RX ADMIN — TICAGRELOR 90 MG: 90 TABLET ORAL at 10:17

## 2021-03-06 RX ADMIN — Medication 10 ML: at 20:21

## 2021-03-06 RX ADMIN — INSULIN GLARGINE 10 UNITS: 100 INJECTION, SOLUTION SUBCUTANEOUS at 09:49

## 2021-03-06 RX ADMIN — LISINOPRIL 10 MG: 10 TABLET ORAL at 10:15

## 2021-03-06 NOTE — PROGRESS NOTES
BG noted to be 44. PM NP paged. OJ and turkey sandwich given. Recheck 15 minutes later was 79. Pt remains alert and oriented. Second OJ and sandwich given with breanna crackers and peanut butter. Will recheck BG in 1 hour. Call light within reach, will continue to monitor.

## 2021-03-06 NOTE — PROGRESS NOTES
Pt admitted to c3 from ED. Oriented to room, call light and POC. Pt denies needs at this time. Concern voiced in ED over potential homelessness and lack of taking home meds. Pt states that she takes her meds as ordered and lives independently in an apartment. Pt denies financial assistance needs. Call light within reach, will continue to monitor.

## 2021-03-06 NOTE — PLAN OF CARE
4 Eyes Skin Assessment     The patient is being assess for  Admission    I agree that 2 RN's have performed a thorough Head to Toe Skin Assessment on the patient. ALL assessment sites listed below have been assessed. Areas assessed by both nurses:   [x]   Head, Face, and Ears   [x]   Shoulders, Back, and Chest  [x]   Arms, Elbows, and Hands   [x]   Coccyx, Sacrum, and IschIum  [x]   Legs, Feet, and Heels        Does the Patient have Skin Breakdown?   No         Maco Prevention initiated:  NA   Wound Care Orders initiated:  NA      [facility] nurse consulted for Pressure Injury (Stage 3,4, Unstageable, DTI, NWPT, and Complex wounds), New and Established Ostomies:  NA      Nurse 1 eSignature: Electronically signed by Denney Runner, RN on 3/5/21 at 10:58 PM EST    **SHARE this note so that the co-signing nurse is able to place an eSignature**    Nurse 2 eSignature: Electronically signed by Juan Ramon Boudreaux RN on 3/6/21 at 4:28 AM EST         '

## 2021-03-06 NOTE — PROGRESS NOTES
VSS. Pt a/o x4, currently working with PT/OT, denies pain. +appetite, 100% of breakfast tray consumed. Pt remains a high fall risk, PT/OT aware to place bed/chair alarm when therapy complete.

## 2021-03-06 NOTE — PROGRESS NOTES
Physical Therapy    Facility/Department: [facility] C3 TELE/MED SURG/ONC  Initial Assessment/Treatment    NAME: Rosario Tucker  : 1960  MRN: [id number]    Date of Service: 3/6/2021    Discharge Recommendations:  24 hour supervision or assist, Home with Home health PT   PT Equipment Recommendations  Equipment Needed: No    Assessment   Body structures, Functions, Activity limitations: Decreased functional mobility ; Decreased balance;Decreased strength;Decreased endurance  Assessment: Pt is a 63 y.o. female admitted to [facility] secondary to hyperglycemia and encephalopathy. Pt lives alone in one level apartment with 6 MIKEY and is typically I with functional mobility/gait without AD. Pt reports she recently d/c from SNF following fall. Pt is currently functioning below her baseline completing t/f and gait without AD with CGA. Pt is mildly unsteady with gait activities. Pt will benefit from continued skilled PT in acute care setting to address above deficits. Recommend pt d/c home with 24 hr assist and HHPT. Treatment Diagnosis: Impaired balance and gait  Specific instructions for Next Treatment: Progress mobility as tolerated  Prognosis: Good  Decision Making: Medium Complexity  PT Education: Goals; General Safety;Gait Training;PT Role;Disease Specific Education;Plan of Care; Functional Mobility Training;Precautions; Injury Prevention;Transfer Training  Patient Education: Importance of OOB mobility and gait, safe techniques with functioal mobility. Pt verbalized understanding  Barriers to Learning: None  REQUIRES PT FOLLOW UP: Yes  Activity Tolerance  Activity Tolerance: Patient Tolerated treatment well       Patient Diagnosis(es): The encounter diagnosis was Hyperglycemia without ketosis.      has a past medical history of Abnormal brain MRI, Acute bilateral low back pain without sciatica, SHARRON (acute kidney [facility]) [facility] Arthritis, Bipolar disorder ([facility] CAD (coronary artery disease), Cancer ([facility] Carotid stenosis, bilateral:<50%:per [place] 7/2016, Carpal tunnel syndrome, Cervical cancer screening, Coronary artery disease of native artery of native heart with stable angina pectoris ([facility] DDD (degenerative disc disease), lumbar, Depression, Depression/anxiety, Depression/anxiety, Diabetes mellitus ([facility] Gout, History of mammogram, Hyperlipidemia, Hypertension, 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 has a past surgical history that includes Kidney removal; Hysterectomy; Breast lumpectomy (2015); Tubal ligation; other surgical history (Right); Cardiac catheterization (06/08/2020); Upper gastrointestinal endoscopy (N/A, 1/29/2021); Colonoscopy (N/A, 2/1/2021); and CT BIOPSY BONE MARROW (2/3/2021).     Restrictions  Restrictions/Precautions  Restrictions/Precautions: General Precautions, Fall Risk  Position Activity Restriction  Other position/activity restrictions: Telemetry, up with assist  Vision/Hearing  Vision: Impaired  Vision Exceptions: Wears glasses for reading  Hearing: Within functional limits     Subjective  General  Chart Reviewed: Yes  Patient assessed for rehabilitation services?: Yes  Additional Pertinent Hx: Hx: CAD, breast CA  Response To Previous Treatment: Not applicable  Family / Caregiver Present: No  Referring Practitioner: Loida Camp MD  Referral Date : 03/06/21  Diagnosis: Hyperglycemia, encephalopathy  Follows Commands: Within Functional Limits  General Comment  Comments: RN cleared pt for session  Subjective  Subjective: Pt seated EOB on arrival, agreeable to PT evaluation  Pain Screening  Patient Currently in Pain: Denies  Vital Signs  Temp: 97.7 °F (36.5 °C)  Temp Source: Oral  Pulse: 67  Heart Rate Source: Monitor  BP: (!) 168/74  BP Location: Left upper arm  MAP (mmHg): 106  Patient Position: Sitting  Patient Currently in Pain: Denies  Oxygen Therapy  SpO2: 100 %  Pulse Oximeter Device Mode: Intermittent  O2 Device: None (Room air)       Orientation  Orientation  Overall Orientation Status: Within Functional Limits  Social/Functional History  Social/Functional History  Lives With: Alone  Type of Home: Apartment  Home Layout: One level  Home Access: Stairs to enter with rails  Entrance Stairs - Number of Steps: 6 with UHR  Bathroom Shower/Tub: Tub/Shower unit, Shower chair with back  Bathroom Toilet: Standard  Home Equipment: Rolling walker, Cane  ADL Assistance: Independent  Homemaking Responsibilities: Yes  Laundry Responsibility: Primary  Cleaning Responsibility: Primary  Shopping Responsibility: Primary  Ambulation Assistance: Independent  Transfer Assistance: Independent  Active : No  Patient's  Info: Friends or transportation  Additional Comments: Recently d/c from SNF d/t fall (reports stay 2-3 weeks); reports friends can stay with her at d/c if needed    Objective     Observation/Palpation  Posture: Fair    AROM RLE (degrees)  RLE AROM: WFL  AROM LLE (degrees)  LLE AROM : WFL     Strength RLE  Comment: Grossly 4/5  Strength LLE  Comment: Grossly 4/5     Tone RLE  RLE Tone: Normotonic  Tone LLE  LLE Tone: Normotonic  Motor Control  Gross Motor?: WFL     Sensation  Overall Sensation Status: WNL     Bed mobility  Supine to Sit: Unable to assess  Sit to Supine: Unable to assess  Comment: Pt seated EOB at start of session and up on shower chair at EOS     Transfers  Sit to Stand: Contact guard assistance  Stand to sit: Contact guard assistance  Comment: EOB no AD CGA completed x 2 reps     Ambulation  Ambulation?: Yes  Ambulation 1  Surface: level tile  Device: No Device  Assistance: Contact guard assistance  Quality of Gait: Reciprocal pattern, slightly widened YOSSI, B decreased toe clearance, B decreased heel strike, increased B lateral sway. Pt milldy unsteady. Gait Deviations: Slow Olga; Increased YOSSI; Decreased step length;Decreased step height;Decreased arm swing  Distance: 150' + 25'  Stairs/Curb  Stairs?: No        Balance  Posture: Fair  Sitting - Static: Good  Sitting - Dynamic: Good  Standing - Static: Fair  Standing - Dynamic: Fair;-  Comments: CGA without AD        Plan   Plan  Times per week: 3-5x/wk  Times per day: Daily  Specific instructions for Next Treatment: Progress mobility as tolerated  Current Treatment Recommendations: Strengthening, Neuromuscular Re-education, Home Exercise Program, Safety Education & Training, Balance Training, Endurance Training, Patient/Caregiver Education & Training, Functional Mobility Training, Transfer Training, Gait Training, Stair training  Safety Devices  Type of devices: All fall risk precautions in place, Call light within reach, Nurse notified, Gait belt, Patient at risk for falls(Pt seated on shower chair, bathing, PCA and RN notified and pt instructed to use pull cord when completed)      AM-PAC Score  AM-PAC Inpatient Mobility Raw Score : 18 (03/06/21 1133)  AM-PAC Inpatient T-Scale Score : 43.63 (03/06/21 1133)  Mobility Inpatient CMS 0-100% Score: 46.58 (03/06/21 1133)  Mobility Inpatient CMS G-Code Modifier : CK (03/06/21 1133)          Goals  Short term goals  Time Frame for Short term goals: 3/13/21 (unless otherwise specified)  Short term goal 1: Pt will complete supine to/from sit with I  Short term goal 2: Pt will complete sit to/from stand with LRAD with MI  Short term goal 3: Pt will ambulate >80' with LRAD with MI without LOB  Short term goal 4: Pt will ascend/descend 6 steps with UHR with S without LOB  Short term goal 5: 3/08/21: Pt will participate in 12-15 reps BLE exercises to increase strength and increase I with functional mobility  Patient Goals   Patient goals :  \"Go home\"       Therapy Time   Individual Concurrent Group Co-treatment   Time In 0837         Time Out 0855         Minutes 18         Timed Code Treatment Minutes: 8 Minutes(10 minutes for eval)     If pt is unable to be seen after this session, please let this note serve as discharge summary. Please see case management note for discharge disposition. Thank you.     Gilbert An, PT, DPT

## 2021-03-06 NOTE — ED NOTES
Mary Wang given report and aware that Dr. Raisa Villarreal state glucose at 2200 and 0000 then q4.      Annita Concepcion RN  03/05/21 9890

## 2021-03-06 NOTE — PROGRESS NOTES
Hospitalist Progress Note      PCP: Kemi Goss    Date of Admission: 3/5/2021    Chief Complaint: Hyperglycemia    Hospital Course:  Janae Hopper is a 63 y.o. female who presented to the ED to be evaluated for home glucometer readings greater than 600 on the day of admission. The patient was recently discharged from SNF following January 2021 admission during which she was treated for uncontrolled type II DM (A1c 13.8), UTI, and left preauricular abscess requiring I&D. Although the patient endorses taking her home prescription medications as recommended, she arrives today with a blood glucose reading of 969. Patient was noted to be slightly confused and complaining of increased fatigue and lethargy. She approximates that she has unintentionally lost greater than 100 pounds in less than 1 year. Review of an initial laboratory findings reveals that patient does not have [address] or evidence of DKA. Labs are notable for pseudohyponatremia, SHARRON, mild transaminitis, and anemia of chronic disease. ED attending provider initiated 1 L IV fluid bolus prior to requesting inpatient admission. Hospitalist admitting team requested IV insulin be administered with subsequent hourly repeat glucose levels in an attempt to avoid unnecessary admission to ICU/PCU. Further laboratory evaluation, EKG, CXR, and head CT ordered by hospitalist team while awaiting restoration of moderate glycemic control. Case discussed in detail with clinical nursing supervisor who agrees the patient will be best served with further ED management prior to admission to medical floor. Subjective: Feels much better. Glucose normalized.       Medications:  Reviewed    Infusion Medications    dextrose       Scheduled Medications    aspirin  81 mg Oral Daily    lactobacillus  2 capsule Oral BID WC    lisinopril  10 mg Oral Daily    atorvastatin  10 mg Oral Daily    ticagrelor  90 mg Oral BID    traZODone  100 mg Oral Nightly    03/05/21  1716 03/06/21  0610 03/06/21  1159   * 138 138   K 5.4* 4.0 3.9   CL 91* 105 106   CO2 25 24 23   BUN 14 14 11   CREATININE 1.2 1.0 0.9   CALCIUM 9.4 8.7 9.1   PHOS 4.6 3.2 2.7     Recent Labs     03/05/21  1716   AST 44*   ALT 44*   BILITOT <0.2   ALKPHOS 228*     No results for input(s): INR in the last 72 hours. No results for input(s): Karla Gaster in the last 72 hours. Urinalysis:      Lab Results   Component Value Date    NITRU Negative 03/05/2021    WBCUA 0-2 02/01/2021    BACTERIA 2+ 11/11/2020    RBCUA 5-10 02/01/2021    BLOODU Negative 03/05/2021    SPECGRAV <=1.005 03/05/2021    GLUCOSEU >=1000 03/05/2021    GLUCOSEU >=1000 mg/dL 06/07/2010       Radiology:  CT HEAD WO CONTRAST   Final Result   Mild atrophy which is slightly more prominent and mild chronic microischemic   changes the deep white matter which is unchanged with no acute abnormality   seen. Chronic right ethmoid and maxillary sinusitis which is more prominent.          XR CHEST PORTABLE   Final Result   No evidence of pneumonia                 Assessment/Plan:    Active Hospital Problems    Diagnosis    Acute encephalopathy [G93.40]    Anemia [D64.9]    DM (diabetes mellitus), type 2, uncontrolled with complications ([facility] [address] [C57.8, E11.65]    CAD (coronary artery disease) [I25.10]    History of CVA (cerebrovascular accident) without residual deficits [Z86.73]    Dyslipidemia associated with type 2 diabetes mellitus (HCC) [E11.69, E78.5]    CKD (chronic kidney disease), stage III [N18.30]    Lactic acidosis [E87.2]    History of bilateral breast cancer [Z85.3]    Essential hypertension [I10]    Acute hyperglycemia [R73.9]     Acute hyperglycemia (glucose 968) without ketosis  -Patient to receive 2 L normal saline IVF bolus in ED to address volume depletion and pseudohyponatremia   -Patient to receive IV insulin in ED with hourly glucose checks until value less than 500 is obtained at which time patient may safely be admitted to [place] [address] telemetry floor  -Initiate Lantus 10 units twice daily with first dose to be given this evening  -Upon arrival to medical floor serial Q4H glucose checks scheduled with medium dose Humalog SSI coverage  -Plan to resume diabetic diet in a.m. with subsequent change to AC&HS Accu-Cheks/insulin coverage  --A1c 9.3.     Generalized fatigue with lactic acidosis  -Blood and urine cultures collected to assess for occult infection precipitating hyperglycemia  --received IVF  -PT OT consultation; case management also consulted as suspect patient requires permanent ECF placement     Acute encephalopathy - resolved  -Suspect secondary to [address] and glucose 969 however must rule out other comorbidities  -Thyroid studies, ammonia, procalcitonin, lactate pending     CAD  -Admit to telemetry unit for continuous monitoring  -EKG ordered by hospitalist team without evidence of acute ischemia  -Echo obtained 6/6/2020 revealed normal systolic and diastolic function  -Continue lisinopril, Lipitor, Brilinta, and EC ASA per home dose     Anemia of chronic disease  -H&H appear stable; patient s/p transfusion January 2021  -Iron studies and B12/folate pending  -FOBT during stay  -Continue PPI for GI prophylaxis       DVT Prophylaxis: Lovenox  Diet: DIET CARB CONTROL; Carb Control: 4 carb choices (60 gms)/meal; No Added Salt (3-4 GM)  Code Status: Full Code    PT/OT Eval Status: ordered    Dispo - possibly tomorrow    SARTHAK Gay - CNP

## 2021-03-06 NOTE — H&P
HOSPITALISTS HISTORY AND PHYSICAL    3/5/2021 10:04 PM    Patient Information:  Emeterio Escobedo is a 63 y.o. female [id number]  PCP:  Mary Johnson (Tel: [phone] )    Chief complaint:    Chief Complaint   Patient presents with    Hyperglycemia     pt reports blood sugars \"being over 600 today\"        History of Present Illness:  Yosef Bustillo is a 63 y.o. female who presented to the ED to be evaluated for home glucometer readings greater than 600 on the day of admission. The patient was recently discharged from SNF following January 2021 admission during which she was treated for uncontrolled type II DM (A1c 13.8), UTI, and left preauricular abscess requiring I&D. Although the patient endorses taking her home prescription medications as recommended, she arrives today with a blood glucose reading of 969. Patient was noted to be slightly confused and complaining of increased fatigue and lethargy. She approximates that she has unintentionally lost greater than 100 pounds in less than 1 year. Review of an initial laboratory findings reveals that patient does not have [address] or evidence of DKA. Labs are notable for pseudohyponatremia, SHARRON, mild transaminitis, and anemia of chronic disease. ED attending provider initiated 1 L IV fluid bolus prior to requesting inpatient admission. Hospitalist admitting team requested IV insulin be administered with subsequent hourly repeat glucose levels in an attempt to avoid unnecessary admission to ICU/PCU. Further laboratory evaluation, EKG, CXR, and head CT ordered by hospitalist team while awaiting restoration of moderate glycemic control. Case discussed in detail with clinical nursing supervisor who agrees the patient will be best served with further ED management prior to admission to medical floor.         History obtained from patient and review of Epic chart    Old medical records show patient has struggled in the past with being able to care for her multiple comorbid conditions. Her presentation this evening with glucose levels approaching 1000 patient likely requires permanent SNF placement. REVIEW OF SYSTEMS:   Constitutional: Negative for fever,chills; positive weight loss and fatigue  ENT: Negative for rhinorrhea, epistaxis, hoarseness, and sore throat.   Respiratory: Negative for shortness of breath, wheezing, and cough  Cardiovascular: Negative for chest pain, palpitations, and peripheral edema; no orthopnea or PND  Gastrointestinal: Negative for N/V/D; no hematemesis, hematochezia, or melena; positive anorexia  Genitourinary: Negative for dysuria, hesitancy, and urgency  Hematologic/Lymphatic: Negative for bleeding tendency, excessive bruising, and enlarged LN; received transfusion of PRBC last admission  Musculoskeletal: Positive for myalgias and arthalgias; frequent falls  Neurologic: Negative for LOC, seizure activity, paresthesias, dysarthria, vertigo, and gait disturbance  Skin: Preauricular cellulitis and abscess resolved from previous admission  Psychiatric: Positive for confusion, depression  Endocrine: Positive for polyuria/polydipsia    Past Medical History:   has a past medical history of Abnormal brain MRI, Acute bilateral low back pain without sciatica, SHARRON (acute kidney injury) ([address] Arthritis, Bipolar disorder ([address] CAD (coronary artery disease), Cancer ([address] Carotid stenosis, bilateral:<50%:per [place] 7/2016, Carpal tunnel syndrome, Cervical cancer screening, Coronary artery disease of native artery of native heart with stable angina pectoris ([address] DDD (degenerative disc disease), lumbar, Depression, Depression/anxiety, Depression/anxiety, Diabetes mellitus ([address] Gout, History of mammogram, Hyperlipidemia, Hypertension, 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 stage 3 chronic kidney disease ([address] Take 10 mg by mouth daily      Empagliflozin-metFORMIN HCl 12.5-1000 MG TABS Take 2 tablets by mouth daily 60 tablet 5    Blood Pressure Monitor GINA Check BP at home once or twice a day 1 Device 0    Insulin Syringe-Needle U-100 31G X 5/16\" 0.3 ML MISC USE 3 TIMES A DAY BEFORE MEALS 90 each 7    calcium carbonate-vitamin D (CALTRATE) 600-400 MG-UNIT TABS per tab 1 tablet daily       glucagon 1 MG injection Inject 1 mg into the muscle See Admin Instructions Follow package directions for low blood sugar. 1 kit 0    aspirin 81 MG tablet Take 81 mg by mouth daily      gabapentin (NEURONTIN) 600 MG tablet Take 600 mg by mouth 3 times daily      traZODone (DESYREL) 100 MG tablet Take 100 mg by mouth nightly         Allergies: Allergies   Allergen Reactions    Morphine Anaphylaxis and Hives     feels like throat is closing    Penicillins Hives and Swelling    Codeine Hives and Rash    Penicillin G Rash        Social History:   reports that she quit smoking about 6 years ago. Her smoking use included cigarettes and cigars. She has a 10.00 pack-year smoking history. She has never used smokeless tobacco. She reports that she does not drink alcohol or use drugs. Family History:  family history includes Cancer in her father and mother.      Physical Exam:  BP (!) 163/94   Pulse 66   Temp 97.2 °F (36.2 °C)   Resp 23   Ht 5' 3\" (1.6 m)   Wt 111 lb 1.6 oz (50.4 kg)   SpO2 99%   BMI 19.68 kg/m²     General appearance:    Eyes: Sclera clear without conjunctival injection; PERRLA; EOMI  ENT: Mucous membranes moist without thrush; normal dentition  Neck: Supple without meningismus; no goiter; no carotid bruit bilaterally  Cardiovascular: Regular rhythm without ectopy; normal S1-S2 with no murmurs; no peripheral edema; no JVD  Respiratory: No tachypnea; CTAB with adequate air exchange, no wheeze, rhonchi or rales; I:E intact  Gastrointestinal: Abdomen soft, non-tender, not distended; bowel sounds normal x4 Problems:    Acute hyperglycemia    Acute encephalopathy    Essential hypertension    Lactic acidosis    CKD (chronic kidney disease), stage III    Dyslipidemia associated with type 2 diabetes mellitus (HCC)    CAD (coronary artery disease)    Anemia    History of bilateral breast cancer    History of CVA (cerebrovascular accident) without residual deficits  Resolved Problems:    * No resolved hospital problems.  *        Assessment/Plan:     Acute hyperglycemia (glucose 968) without ketosis  -Patient to receive 2 L normal saline IVF bolus in ED to address volume depletion and pseudohyponatremia   -Patient to receive IV insulin in ED with hourly glucose checks until value less than 500 is obtained at which time patient may safely be admitted to [place] [address] telemetry floor  -Initiate Lantus 10 units twice daily with first dose to be given this evening  -Renal panel every 6 hours to assess electrolyte repletion  -Upon arrival to medical floor serial Q4H glucose checks scheduled with medium dose Humalog SSI coverage  -Plan to resume diabetic diet in a.m. with subsequent change to AC&HS Accu-Cheks/insulin coverage  -A1c and serum osmolality pending 2 hours    Generalized fatigue with lactic acidosis  -Blood and urine cultures collected to assess for occult infection precipitating hyperglycemia  -Aggressive IV fluid bolus in ED followed by LR at 100 cc/h overnight  -Serial lactate levels every 4 hours  -TSH/free T4, iron studies, B12/folate, prealbumin, vitamin D levels collected with results pending at time of dictation  -PT OT consultation; case management also consulted as suspect patient requires permanent ECF placement    Acute encephalopathy  -Suspect secondary to [address] and glucose 969 however must rule out other comorbidities  -Stat head CT to further assess in lieu of history of metastatic breast cancer  -Neurochecks every 4 hours overnight  -Thyroid studies, ammonia, procalcitonin, lactate pending    CAD  -Admit to telemetry unit for continuous monitoring  -EKG ordered by hospitalist team without evidence of acute ischemia  -Echo obtained 6/6/2020 revealed normal systolic and diastolic function  -Continue lisinopril, Lipitor, Brilinta, and EC ASA per home dose    Anemia of chronic disease  -H&H appear stable; patient s/p transfusion January 2021  -Iron studies and B12/folate pending  -FOBT during stay  -Continue PPI for GI prophylaxis      DVT prophylaxis-BLE SCD-continue DAPT with ASA and Brilinta  Code status-full code  Diet-n.p.o. until tighter glycemic control; advance to cardiac FAUZIA with carb restriction in a.m.  IV access-PIV established in ED    Admit as inpatient. I anticipate hospitalization spanning more than two midnights for investigation and treatment of the above medically necessary diagnoses. Please note that some part of this chart was generated using Dragon dictation software. Although every effort was made to ensure the accuracy of this automated transcription, some errors in transcription may have occurred inadvertently. If you may need any clarification, please do not hesitate to contact me through [facility].        Hans Temple MD    3/5/2021 10:04 PM

## 2021-03-06 NOTE — PLAN OF CARE
Problem: Musculor/Skeletal Functional Status  Intervention: PT Evaluation/treatment  Note: Increase function to baseline

## 2021-03-06 NOTE — PLAN OF CARE
Pt is a fall risk and was just discharged from a rehab facility two days ago d/t a recent fall. Bed in lowest position with wheels locked and side rails x2. Non slip socks and bed alarm on with room door open. Call light and bedside table within reach, will continue to monitor.

## 2021-03-06 NOTE — PROGRESS NOTES
Occupational Therapy   Occupational Therapy Initial Assessment and Treatment  Date: 3/6/2021   Patient Name: Tiffany Cuevas  MRN: [id number]     : 1960    Date of Service: 3/6/2021    Discharge Recommendations:  Home with assist PRN, Home with Home health OT(initial 24hr if available)   Pt owns needed DME       Assessment   Performance deficits / Impairments: Decreased functional mobility ; Decreased ADL status; Decreased balance;Decreased endurance;Decreased high-level IADLs  After evaluation and treatment, pt found to be presenting with the above mentioned deficits. Pt would benefit from continued skilled occupational therapy to address these deficits, increasing safety and independence with ADL and functional mobility. Pt home from SNF for 2 days prior to readmit. She is normally independent, but now requires CGA to SBA for functional standing activity without device. Will continue to assess for discharge needs. Prognosis: Good  Decision Making: Low Complexity  REQUIRES OT FOLLOW UP: Yes  Activity Tolerance  Activity Tolerance: Patient Tolerated treatment well  Activity Tolerance: Vitals seated after ~5 mins functional standing activity: 168/74, 100% on RA, 70bpm. Pt without complaint during session. Safety Devices  Safety Devices in place: Yes  Type of devices: Call light within reach;Nurse notified;Gait belt(left on shower chair - RN and PCA aware)         Patient Diagnosis(es): The encounter diagnosis was Hyperglycemia without ketosis.      has a past medical history of Abnormal brain MRI, Acute bilateral low back pain without sciatica, SHARRON (acute kidney injury) ([address] Arthritis, Bipolar disorder ([address] CAD (coronary artery disease), Cancer ([address] Carotid stenosis, bilateral:<50%:per [place] 2016, Carpal tunnel syndrome, Cervical cancer screening, Coronary artery disease of native artery of native heart with stable angina pectoris ([address] DDD (degenerative disc disease), lumbar, Depression, Depression/anxiety, Depression/anxiety, Diabetes mellitus ([facility] [address] Gout, History of mammogram, Hyperlipidemia, Hypertension, 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 has a past surgical history that includes Kidney removal; Hysterectomy; Breast lumpectomy (2015); Tubal ligation; other surgical history (Right); Cardiac catheterization (06/08/2020); Upper gastrointestinal endoscopy (N/A, 1/29/2021); Colonoscopy (N/A, 2/1/2021); and CT BIOPSY BONE MARROW (2/3/2021). Restrictions  Restrictions/Precautions  Restrictions/Precautions: General Precautions, Fall Risk  Position Activity Restriction  Other position/activity restrictions: Telemetry, up with assist    Subjective   General  Chart Reviewed: Yes  Patient assessed for rehabilitation services?: Yes  Additional Pertinent Hx: CAD, HTN, DM, depression, bipolar; recent SNF stay 2/2 admit for uncontrolled DM - pt reports she was home 2 days  Family / Caregiver Present: No  Referring Practitioner: Iris Merchant MD  Diagnosis: Acute hyperglycemia  Subjective  Subjective: RN cleared pt for tx. Pt sitting EOB at session start with PCA.      Patient Currently in Pain: Denies    Social/Functional History  Social/Functional History  Lives With: Alone  Type of Home: Apartment  Home Layout: One level  Home Access: Stairs to enter with rails  Entrance Stairs - Number of Steps: 6 with UHR  Bathroom Shower/Tub: Tub/Shower unit, Shower chair with back  Bathroom Toilet: Standard  Home Equipment: Rolling walker, Cane  ADL Assistance: Independent  Homemaking Responsibilities: Yes  Laundry Responsibility: Primary  Cleaning Responsibility: Primary  Shopping Responsibility: Primary  Ambulation Assistance: Independent  Transfer Assistance: Independent  Active : No  Patient's  Info: Friends or transportation  Additional Comments: Recently d/c from SNF d/t fall (reports stay 2-3 weeks); reports friends can stay with her at d/c if needed. Pt reports she is able to read and monitor glucose without difficulty. Objective   Vision: Impaired  Vision Exceptions: Wears glasses for reading  Hearing: Within functional limits    Orientation  Overall Orientation Status: Within Functional Limits     Observation/Palpation  Posture: Fair  Balance  Sitting Balance: Modified independent   Standing Balance: Contact guard assistance(to SBA, no device)  Functional Mobility  Functional - Mobility Device: No device  Activity: To/from bathroom(plus functional household distance in figueredo)  Assist Level: Contact guard assistance(to SBA)     ADL  Feeding: Independent(to drink EOB)  UE Dressing: Maximum assistance(to remove sports bra seated 2/2 IV in B arms)  LE Dressing: Supervision(to doff B socks and brief seated and standing)  Additional Comments: Pt set-up for shower on shower chair at end of session with RN approval and PCA aware. Pt educated to remain seated and pull red cord for staff when finished or if needed. Pt stated understanding. Tone RUE  RUE Tone: Normotonic  Tone LUE  LUE Tone: Normotonic  Coordination  Movements Are Fluid And Coordinated: Yes           Transfers  Sit to stand: Stand by assistance  Stand to sit: Stand by assistance        Cognition  Overall Cognitive Status: Exceptions  Arousal/Alertness: Appropriate responses to stimuli  Following Commands:  Follows one step commands consistently  Attention Span: Appears intact  Memory: Appears intact  Problem Solving: Decreased awareness of errors  Insights: Decreased awareness of deficits  Initiation: Does not require cues  Sequencing: Does not require cues Sensation  Overall Sensation Status: WNL                         Education: Role of OT, safe t/f training, safe use of DME, awareness of deficits, discharge planning, ADL as therapeutic exercise, importance of [address]  Times per week: 3-5    AM-PAC Score        AM-PAC Inpatient Daily Activity Raw Score: 18 (03/06/21 1208)  AM-PAC Inpatient ADL T-Scale Score : 38.66 (03/06/21 1208)  ADL Inpatient CMS 0-100% Score: 46.65 (03/06/21 1208)  ADL Inpatient CMS G-Code Modifier : CK (03/06/21 1208)    Goals  Short term goals  Time Frame for Short term goals: 1 week (3/13/21)  Short term goal 1: LB dressing with SPV  Short term goal 2: 8 mins functional standing activity with SPV  Short term goal 3: Toilet t/f and hygiene from ambulatory level with SPV  Patient Goals   Patient goals : \"I want to go home from here and I want to get a shower today. \"       Therapy Time   Individual Concurrent Group Co-treatment   Time In 2269         Time Out 0855         Minutes 18         Timed Code Treatment Minutes: 8 Minutes       If patient is discharged prior to next treatment session, this note will serve as the discharge summary.   Earnesteen Angelucci, OTR/L #[id number]

## 2021-03-07 VITALS
BODY MASS INDEX: 19.7 KG/M2 | RESPIRATION RATE: 18 BRPM | DIASTOLIC BLOOD PRESSURE: 74 MMHG | SYSTOLIC BLOOD PRESSURE: 167 MMHG | OXYGEN SATURATION: 100 % | HEIGHT: 63 IN | TEMPERATURE: 98.2 F | HEART RATE: 68 BPM | WEIGHT: 111.2 LBS

## 2021-03-07 LAB
A/G RATIO: 0.9 (ref 1.1–2.2)
ALBUMIN SERPL-MCNC: 3.1 G/DL (ref 3.4–5)
ALP BLD-CCNC: 181 U/L (ref 40–129)
ALT SERPL-CCNC: 32 U/L (ref 10–40)
ANION GAP SERPL CALCULATED.3IONS-SCNC: 10 MMOL/L (ref 3–16)
AST SERPL-CCNC: 22 U/L (ref 15–37)
BASOPHILS ABSOLUTE: 0 K/UL (ref 0–0.2)
BASOPHILS RELATIVE PERCENT: 0.7 %
BILIRUB SERPL-MCNC: <0.2 MG/DL (ref 0–1)
BUN BLDV-MCNC: 9 MG/DL (ref 7–20)
CALCIUM SERPL-MCNC: 9.2 MG/DL (ref 8.3–10.6)
CHLORIDE BLD-SCNC: 110 MMOL/L (ref 99–110)
CO2: 22 MMOL/L (ref 21–32)
CREAT SERPL-MCNC: 0.9 MG/DL (ref 0.6–1.2)
EOSINOPHILS ABSOLUTE: 0.1 K/UL (ref 0–0.6)
EOSINOPHILS RELATIVE PERCENT: 1.3 %
GFR AFRICAN AMERICAN: >60
GFR NON-AFRICAN AMERICAN: >60
GLOBULIN: 3.5 G/DL
GLUCOSE BLD-MCNC: 155 MG/DL (ref 70–99)
GLUCOSE BLD-MCNC: 161 MG/DL (ref 70–99)
GLUCOSE BLD-MCNC: 205 MG/DL (ref 70–99)
GLUCOSE BLD-MCNC: 74 MG/DL (ref 70–99)
GLUCOSE BLD-MCNC: 75 MG/DL (ref 70–99)
HCT VFR BLD CALC: 30 % (ref 36–48)
HEMOGLOBIN: 9.8 G/DL (ref 12–16)
LYMPHOCYTES ABSOLUTE: 1.7 K/UL (ref 1–5.1)
LYMPHOCYTES RELATIVE PERCENT: 40.6 %
MCH RBC QN AUTO: 28.9 PG (ref 26–34)
MCHC RBC AUTO-ENTMCNC: 32.7 G/DL (ref 31–36)
MCV RBC AUTO: 88.6 FL (ref 80–100)
MONOCYTES ABSOLUTE: 0.3 K/UL (ref 0–1.3)
MONOCYTES RELATIVE PERCENT: 8.2 %
NEUTROPHILS ABSOLUTE: 2.1 K/UL (ref 1.7–7.7)
NEUTROPHILS RELATIVE PERCENT: 49.2 %
PDW BLD-RTO: 13.9 % (ref 12.4–15.4)
PERFORMED ON: ABNORMAL
PERFORMED ON: NORMAL
PLATELET # BLD: 150 K/UL (ref 135–450)
PMV BLD AUTO: 9.9 FL (ref 5–10.5)
POTASSIUM REFLEX MAGNESIUM: 3.6 MMOL/L (ref 3.5–5.1)
RBC # BLD: 3.39 M/UL (ref 4–5.2)
SODIUM BLD-SCNC: 142 MMOL/L (ref 136–145)
TOTAL PROTEIN: 6.6 G/DL (ref 6.4–8.2)
WBC # BLD: 4.2 K/UL (ref 4–11)

## 2021-03-07 PROCEDURE — 96372 THER/PROPH/DIAG INJ SC/IM: CPT

## 2021-03-07 PROCEDURE — 80053 COMPREHEN METABOLIC PANEL: CPT

## 2021-03-07 PROCEDURE — 36415 COLL VENOUS BLD VENIPUNCTURE: CPT

## 2021-03-07 PROCEDURE — 6360000002 HC RX W HCPCS: Performed by: NURSE PRACTITIONER

## 2021-03-07 PROCEDURE — 6370000000 HC RX 637 (ALT 250 FOR IP): Performed by: HOSPITALIST

## 2021-03-07 PROCEDURE — 6370000000 HC RX 637 (ALT 250 FOR IP): Performed by: NURSE PRACTITIONER

## 2021-03-07 PROCEDURE — 85025 COMPLETE CBC W/AUTO DIFF WBC: CPT

## 2021-03-07 PROCEDURE — 2580000003 HC RX 258: Performed by: HOSPITALIST

## 2021-03-07 PROCEDURE — G0378 HOSPITAL OBSERVATION PER HR: HCPCS

## 2021-03-07 RX ORDER — PANTOPRAZOLE SODIUM 40 MG/1
40 TABLET, DELAYED RELEASE ORAL
Qty: 30 TABLET | Refills: 3 | Status: SHIPPED | OUTPATIENT
Start: 2021-03-08 | End: 2021-03-09

## 2021-03-07 RX ADMIN — ATORVASTATIN CALCIUM 10 MG: 10 TABLET, FILM COATED ORAL at 08:50

## 2021-03-07 RX ADMIN — ENOXAPARIN SODIUM 40 MG: 40 INJECTION SUBCUTANEOUS at 08:50

## 2021-03-07 RX ADMIN — PANTOPRAZOLE SODIUM 40 MG: 40 TABLET, DELAYED RELEASE ORAL at 05:14

## 2021-03-07 RX ADMIN — LISINOPRIL 10 MG: 10 TABLET ORAL at 08:50

## 2021-03-07 RX ADMIN — TICAGRELOR 90 MG: 90 TABLET ORAL at 08:50

## 2021-03-07 RX ADMIN — INSULIN LISPRO 2 UNITS: 100 INJECTION, SOLUTION INTRAVENOUS; SUBCUTANEOUS at 12:42

## 2021-03-07 RX ADMIN — ASPIRIN 81 MG: 81 TABLET, COATED ORAL at 08:50

## 2021-03-07 RX ADMIN — Medication 10 ML: at 08:55

## 2021-03-07 RX ADMIN — Medication 2 CAPSULE: at 08:49

## 2021-03-07 RX ADMIN — INSULIN GLARGINE 10 UNITS: 100 INJECTION, SOLUTION SUBCUTANEOUS at 08:50

## 2021-03-07 RX ADMIN — PALIPERIDONE 3 MG: 3 TABLET, EXTENDED RELEASE ORAL at 08:49

## 2021-03-07 NOTE — PROGRESS NOTES
Assessment complete and charted earlier in shift. BG better controlled today. Pt denies needs at this time. Call light within reach. Will continue to monitor.

## 2021-03-07 NOTE — CARE COORDINATION
CASE MANAGEMENT DISCHARGE SUMMARY      Discharge to: Home with Vivian Estevez referral    Transportation:    Family/car: yes    Confirmed discharge plan with:     Patient: yes      Family, name and contact number: in room with pt   Facility/Agency, name: [address] [place] [address] Anil cowan      RN, name: Juan M Rowe RN    Note: Discharging nurse to complete CHINO, reconcile AVS, and place final copy with patient's discharge packet. RN to ensure that written prescriptions for  Level II medications are sent with patient to the facility as per protocol.

## 2021-03-07 NOTE — DISCHARGE INSTR - COC
Continuity of Care Form    Patient Name: Warren Santos   :  1960  MRN:  [id number]    Admit date:  3/5/2021  Discharge date:  3/7/2021    Code Status Order: Full Code   Advance Directives:   Advance Care Flowsheet Documentation       Date/Time Healthcare Directive Type of Healthcare Directive Copy in [address] Agent's Name Healthcare Agent's Phone Number    [phone]  No, patient does not have an advance directive for healthcare treatment -- -- -- -- --            Admitting Physician:  Zoran Story MD  PCP: Cal Bell    Discharging Nurse: [facility] Unit/Room#: 7031/4922-16  Discharging Unit Phone Number: [phone]    Emergency Contact:   Extended Emergency Contact Information  Primary Emergency Contact: Hanna Franco [address] Phone: [phone]  Work Phone: [phone]  Mobile Phone: [phone]  Relation: Child  Secondary Emergency Contact: Zenaida Oshea [facility] Phone: [phone]  Mobile Phone: [phone]  Relation: Other    Past Surgical History:  Past Surgical History:   Procedure Laterality Date    BREAST LUMPECTOMY      Bilateral:breast cancer    CARDIAC CATHETERIZATION  2020    Dr. Toro Esquivel), DAYANA to Diag 1    COLONOSCOPY N/A 2021    COLONOSCOPY DIAGNOSTIC performed by Shiloh Rios MD at [place] [address]  2/3/2021    CT BONE MARROW BIOPSY 2/3/2021 Charisse Sanchez MD [facility] CT SCAN    HYSTERECTOMY      Benign:no cervical cancer per pt'    KIDNEY REMOVAL      right    OTHER SURGICAL HISTORY Right     orif right ankle    TUBAL LIGATION      UPPER GASTROINTESTINAL ENDOSCOPY N/A 2021    EGD BIOPSY performed by Shiloh Rios MD at [address] [place] [address] ENDOSCOPY       Immunization History:   Immunization History   Administered Date(s) Administered    Influenza, MDCK Quadv, IM, PF (Flucelvax 4 yrs and older) 2017    Influenza, Quadv, IM, PF (6 mo and older Fluzone, Flulaval, Fluarix, and 3 yrs and older Afluria) 10/11/2016, 01/23/2020    Tdap (Boostrix, Adacel) 10/07/2020       Active Problems:  Patient Active Problem List   Diagnosis Code    Acute hyperglycemia R73.9    Essential hypertension I10    Bilateral malignant neoplasm of breast in female ([facility] C50.911, C50.912    Microalbuminuria R80.9    Obesity (BMI 30-39. 9) E66.9    Slurred speech R47.81    Hx of ischemic CVA I63.40    Brain metastases ([facility]) C79.31    Carotid stenosis, bilateral:<50%:per US 7/2016 I65.23    SHARRON (acute kidney injury) ([facility]) N17.9    Lactic acidosis E87.2    Frequent falls R29.6    Depression/anxiety F41.8    Abnormal brain MRI R90.89    Acute bilateral low back pain without sciatica M54.5    Uncontrolled type 2 diabetes mellitus with microalbuminuria, with long-term current use of insulin ([facility]) E11.29, E11.65, R80.9, Z79.4    Closed fracture of right ankle, with routine healing, subsequent encounter S82.891D    CKD (chronic kidney disease), stage III N18.30    Uncontrolled type 2 diabetes mellitus with diabetic nephropathy, with long-term current use of insulin ([facility]) E11.21, E11.65, Z79.4    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 ([facility] E11.59    Dyslipidemia associated with type 2 diabetes mellitus ([facility] E11.69, E78.5    Bipolar disorder ([facility]) F31.9    Cardiomegaly I51.7    Cardiomyopathy ([facility] I42.9    Carpal tunnel syndrome G56.00    Chronic systolic heart failure ([facility]) I50.22    Diffuse cystic mastopathy N60.19    Edema R60.9    Gallstone pancreatitis K85.10    Gout M10.9    History of bilateral breast cancer Z85.3    History of renal cell carcinoma Z85.528    Cardiomyopathy in other diseases classified elsewhere I43    Mononeuritis G58.9    Myalgia and myositis HTN8600    Nonspecific abnormal electrocardiogram (ECG) (EKG) R94.31    Patient in clinical research study Z00.6    Peroneal muscular atrophy G60.0    Scoliosis (and kyphoscoliosis), idiopathic M41.20    SOBOE (shortness of breath on exertion) R06.02    Syncope and collapse R55    Chronic pain disorder G89.4    DDD (degenerative disc disease), lumbar M51.36    Diabetic polyneuropathy associated with type 2 diabetes mellitus ([facility]) E11.42    Other secondary scoliosis, lumbosacral region M41.57    Thoracic spondylosis without myelopathy M47.814    Morbid obesity due to excess calories ([facility]) E66.01    Age-related nuclear cataract of both eyes H25.13    Hypermetropia, bilateral H52.03    Hypertensive retinopathy, bilateral H35.033    Vitreous degeneration, bilateral H43.813    Acute bilateral low back pain with left-sided sciatica M54.42    History of CVA (cerebrovascular accident) without residual deficits Z86.73    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 stage 3 chronic kidney disease ([facility]) I13.0, I50.22, N18.30    S/P mastectomy, right Z90.11    Acute cystitis without hematuria N30.00    Hypomagnesemia E83.42    Chest pain R07.9    CAD (coronary artery disease) I25.10    DKA (diabetic ketoacidoses) ([facility]) E11.10    DM (diabetes mellitus), type 2, uncontrolled with complications ([facility]) X58.7, E11.65    Hx of heart artery stent Z95.5    Nuclear senile cataract H25.10    Unintentional weight loss R63.4    Anemia D64.9    Facial abscess L02.01    Asymptomatic bacteriuria R82.71    Acute encephalopathy G93.40       Isolation/Infection:   Isolation            No Isolation          Patient Infection Status       Infection Onset Added Last Indicated Last Indicated By Review Planned Expiration Resolved Resolved By    None active    Resolved    COVID-19 Rule Out 02/05/21 02/05/21 02/05/21 COVID-19 (Ordered)   02/05/21 Rule-Out Test Resulted    COVID-19 Rule Out 01/28/21 01/28/21 01/28/21 COVID-19 (Ordered)   01/28/21 Rule-Out Test Resulted    C-diff Rule Out 01/26/21 01/26/21 01/27/21 Clostridium difficile toxin/antigen (Ordered)   01/27/21 Rule-Out Test Resulted    COVID-19 Rule Out 11/11/20 11/11/20 11/11/20 COVID-19 (Ordered)   11/12/20 Rule-Out Test Resulted    COVID-19 Rule Out 06/07/20 06/07/20 06/07/20 COVID-19 (Ordered)   06/07/20 Frances Prado RN            Nurse Assessment:  Last Vital Signs: BP (!) 167/74   Pulse 68   Temp 98.2 °F (36.8 °C) (Oral)   Resp 18   Ht 5' 3\" (1.6 m)   Wt 111 lb 3.2 oz (50.4 kg)   SpO2 100%   BMI 19.70 kg/m²     Last documented pain score (0-10 scale): Pain Level: 9  Last Weight:   Wt Readings from Last 1 Encounters:   03/07/21 111 lb 3.2 oz (50.4 kg)     Mental Status:  oriented, alert, coherent, logical, thought processes intact and able to concentrate and follow conversation    IV Access:  - None    Nursing Mobility/ADLs:  Walking   Independent  Transfer  Independent  Bathing  Independent  Dressing  Independent  Toileting  Independent  Feeding  Independent  Med [address]  Med Delivery   whole    Wound Care Documentation and Therapy:        Elimination:  Continence:   · Bowel: {YES / [id number]}  · Bladder: {YES / XR:[id number]}  Urinary Catheter: None   Colostomy/Ileostomy/Ileal Conduit: No       Date of Last BM: ***    Intake/Output Summary (Last 24 hours) at 3/7/2021 1417  Last data filed at 3/7/2021 0845  Gross per 24 hour   Intake 1730 ml   Output --   Net 1730 ml     I/O last 3 completed shifts: In: 2150 [P.O.:1500; I.V.:650]  Out: -     Safety Concerns: At Risk for Falls    Impairments/Disabilities:      None    Nutrition Therapy:  Current Nutrition Therapy:   - Oral Diet:  Carb Control 3 carbs/meal (1500kcals/day)    Routes of Feeding: Oral  Liquids: Thin Liquids  Daily Fluid Restriction: no  Last Modified Barium Swallow with Video (Video Swallowing Test): not done    Treatments at the Time of Hospital Discharge:   Respiratory Treatments: ***  Oxygen Therapy:  is not on home oxygen therapy.   Ventilator:    - No ventilator support    Rehab Therapies: Physical Therapy and Occupational Therapy  Weight Bearing Status/Restrictions: No weight bearing restirctions  Other Medical Equipment (for information only, NOT a DME order):  ***  Other Treatments: ***    Patient's personal belongings (please select all that are sent with patient):  ***    RN SIGNATURE:  Electronically signed by Cary Brewer RN on 3/7/21 at 3:13 PM EST    CASE MANAGEMENT/SOCIAL WORK SECTION    Inpatient Status Date: 3/5/21    Readmission Risk Assessment Score:  Readmission Risk              Risk of Unplanned Readmission:        48           Discharging to Facility/ Agency   · Name: [facility]  · Address:  · Phone: [phone]  · Fax:    Dialysis Facility (if applicable)   · Name:  · Address:  · Dialysis Schedule:  · Phone:  · Fax:    / signature: Electronically signed by Essie Wright RN on 3/7/21 at 2:32 PM EST    PHYSICIAN SECTION    Prognosis: Good    Condition at Discharge: Stable    Recommended Labs or Other Treatments After Discharge: Home PT/OT    Physician Certification: I certify the above information and transfer of Selam Hudson  is necessary for the continuing treatment of the diagnosis listed and that she requires Home Care for greater 30 days.      Update Admission H&P: No change in H&P    PHYSICIAN SIGNATURE:  Electronically signed by SARTHAK Cortez CNP/Crow Malone MD on 3/7/21 at 2:20 PM EST

## 2021-03-08 ENCOUNTER — TELEPHONE (OUTPATIENT)
Dept: ENDOCRINOLOGY | Age: 61
End: 2021-03-08

## 2021-03-09 ENCOUNTER — OFFICE VISIT (OUTPATIENT)
Dept: ENDOCRINOLOGY | Age: 61
End: 2021-03-09
Payer: MEDICAID

## 2021-03-09 VITALS
WEIGHT: 115 LBS | BODY MASS INDEX: 20.38 KG/M2 | HEIGHT: 63 IN | HEART RATE: 82 BPM | OXYGEN SATURATION: 100 % | DIASTOLIC BLOOD PRESSURE: 80 MMHG | TEMPERATURE: 96.8 F | SYSTOLIC BLOOD PRESSURE: 148 MMHG

## 2021-03-09 DIAGNOSIS — E78.5 DYSLIPIDEMIA ASSOCIATED WITH TYPE 2 DIABETES MELLITUS (HCC): ICD-10-CM

## 2021-03-09 DIAGNOSIS — E11.59 TYPE 2 DIABETES MELLITUS WITH VASCULAR DISEASE (HCC): ICD-10-CM

## 2021-03-09 DIAGNOSIS — I10 ESSENTIAL HYPERTENSION: ICD-10-CM

## 2021-03-09 DIAGNOSIS — E11.69 DYSLIPIDEMIA ASSOCIATED WITH TYPE 2 DIABETES MELLITUS (HCC): ICD-10-CM

## 2021-03-09 PROCEDURE — G8420 CALC BMI NORM PARAMETERS: HCPCS | Performed by: INTERNAL MEDICINE

## 2021-03-09 PROCEDURE — 1036F TOBACCO NON-USER: CPT | Performed by: INTERNAL MEDICINE

## 2021-03-09 PROCEDURE — G8484 FLU IMMUNIZE NO ADMIN: HCPCS | Performed by: INTERNAL MEDICINE

## 2021-03-09 PROCEDURE — 3046F HEMOGLOBIN A1C LEVEL >9.0%: CPT | Performed by: INTERNAL MEDICINE

## 2021-03-09 PROCEDURE — G8427 DOCREV CUR MEDS BY ELIG CLIN: HCPCS | Performed by: INTERNAL MEDICINE

## 2021-03-09 PROCEDURE — 3017F COLORECTAL CA SCREEN DOC REV: CPT | Performed by: INTERNAL MEDICINE

## 2021-03-09 PROCEDURE — 99214 OFFICE O/P EST MOD 30 MIN: CPT | Performed by: INTERNAL MEDICINE

## 2021-03-09 PROCEDURE — 1111F DSCHRG MED/CURRENT MED MERGE: CPT | Performed by: INTERNAL MEDICINE

## 2021-03-09 PROCEDURE — 2022F DILAT RTA XM EVC RTNOPTHY: CPT | Performed by: INTERNAL MEDICINE

## 2021-03-09 RX ORDER — ATORVASTATIN CALCIUM 10 MG/1
10 TABLET, FILM COATED ORAL NIGHTLY
Status: ON HOLD | COMMUNITY
End: 2021-05-20 | Stop reason: HOSPADM

## 2021-03-09 NOTE — PROGRESS NOTES
Patient ID:   Candelaria Bergeron is a 63 y.o. female  Chief Complaint:   Candelaria Bergeron presents for an evaluation of Type 2 Diabetes Mellitus , Hyperlipidemia and hypertension. Subjective:   Type 2 Diabetes Mellitus diagnosed around 2010  On insulin since 2012  Previous regimen:Taking Admelog 15 units tidac and 5 units for snacks. Basaglar 40 units once a day at night. VGO stopped due to hypoglycemias     Humalog stopped as she was missing lantus dose with Humalogs     In June 2020 Iris Folks and amaryl were stopped     Multiple hospitalizations in 2021 due to weakness, anemia   She is recently released from a nursing home     Lantus 25 units daily in am. She was told to take 35 units in am     Not taking:   Synjardy 12.5-1000mg, two tabs in am. Last pick ups: Aug 2020 and Feb 2021. Victoza 1.8 mg daily in AM  - stopped as well     Admits making poor dietary choices, trying to cut down fried. Checks blood sugars 2-3 times per day. Reviewed, 165 - > 500    AM:     Lunch:   Supper:    HS:        Hypoglycemias: Once in last 4 weeks      Meals: 3, dinner is big. Snacks (jellos, fruits, pretzels) after every meal because she is hungry. Exercise: None    Denies chest pain, exertional dyspnea. Family history of CAD: Both parents  Denies smoking/ alcohol.         The following portions of the patient's history were reviewed and updated as appropriate:       Family History   Problem Relation Age of Onset    Cancer Mother         breast    Cancer Father     Heart Failure Neg Hx     High Cholesterol Neg Hx     Hypertension Neg Hx     Migraines Neg Hx     Rashes/Skin Problems Neg Hx     Seizures Neg Hx     Stroke Neg Hx     Thyroid Disease Neg Hx     Diabetes Neg Hx          Social History     Socioeconomic History    Marital status:      Spouse name: Not on file    Number of children: Not on file    Years of education: Not on file    Highest education level: Not on file   Occupational History    Occupation:    Social Needs    Financial resource strain: Not on file    Food insecurity     Worry: Not on file     Inability: Not on file    Transportation needs     Medical: Not on file     Non-medical: Not on file   Tobacco Use    Smoking status: Former Smoker     Packs/day: 0.50     Years: 20.00     Pack years: 10.00     Types: Cigarettes, Cigars     Quit date: 7/3/2014     Years since quittin.6    Smokeless tobacco: Never Used   Substance and Sexual Activity    Alcohol use: No     Alcohol/week: 0.0 standard drinks    Drug use: No    Sexual activity: Never   Lifestyle    Physical activity     Days per week: Not on file     Minutes per session: Not on file    Stress: Not on file   Relationships    Social connections     Talks on phone: Not on file     Gets together: Not on file     Attends [employer] service: Not on file     Active member of club or organization: Not on file     Attends meetings of clubs or organizations: Not on file     Relationship status: Not on file    Intimate partner violence     Fear of current or ex partner: Not on file     Emotionally abused: Not on file     Physically abused: Not on file     Forced sexual activity: Not on file   Other Topics Concern    Not on file   Social History Narrative    Not on file       Past Medical History:   Diagnosis Date    Abnormal brain MRI 2017    Partially empty sella and minimal chronic small vessel ischemic disease    Acute bilateral low back pain without sciatica 2016    [facility]ARRON (acute kidney injury) ([facility] 2017    Arthritis     back    Bipolar disorder ([facility] 10/18/2008    CAD (coronary artery disease)     stent placed 20    Cancer ([facility]     bilateral breast:s/p lumpectomy/radiation:under care care of breast specialist:Dr. Boone     Carotid stenosis, bilateral:<50%:per US 2016 7/15/2016    Carpal tunnel syndrome 10/18/2008    Cervical cancer screening 2014    Nml per pt'.     Coronary artery disease of native artery of native heart with stable angina pectoris ([facility] 6/9/2020    DDD (degenerative disc disease), lumbar 7/18/2018    Depression     under care of pschiatrist:Dr. 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     Scoliosis     Spondylosis of lumbar region without myelopathy or radiculopathy 3/10/2017    Transient cerebral ischemia 07/15/2016    TIA:7/10/16    Unspecified cerebral artery occlusion with cerebral infarction     TIA       Past Surgical History:   Procedure Laterality Date    BREAST LUMPECTOMY  2015    Bilateral:breast cancer    CARDIAC CATHETERIZATION  06/08/2020    Dr. Isaiah Wilder), DAYANA to Diag 1    COLONOSCOPY N/A 2/1/2021    COLONOSCOPY DIAGNOSTIC performed by Moses Arthur [facility] at [facility]  2/3/2021    CT BONE MARROW BIOPSY 2/3/2021 Neftali Polo MD [facility] CT SCAN    HYSTERECTOMY      Benign:no cervical cancer per pt'    KIDNEY REMOVAL      right    OTHER SURGICAL HISTORY Right     orif right ankle    TUBAL LIGATION      UPPER GASTROINTESTINAL ENDOSCOPY N/A 1/29/2021    EGD BIOPSY [facility] [address] [facility] [address]   Allergen Reactions    Morphine Anaphylaxis and Hives     feels like throat is closing    Penicillins Hives and Swelling    Codeine Hives and Rash    Penicillin G Rash         Current Outpatient Medications:    atorvastatin (LIPITOR) 10 MG tablet, Take 10 mg by mouth nightly, Disp: , Rfl:     paliperidone (INVEGA) 6 MG extended release tablet, Take 1 tablet by mouth every morning, Disp: 30 tablet, Rfl: 3    ticagrelor (BRILINTA) 90 MG TABS tablet, Take 1 tablet by mouth 2 times daily, Disp: 60 tablet, Rfl: 1    insulin glargine (LANTUS SOLOSTAR) 100 UNIT/ML injection pen, Inject 25 Units into the skin every morning (Patient taking differently: Inject 30 Units into the skin every morning ), Disp: 1 pen, Rfl: 1    nitroGLYCERIN (NITROSTAT) 0.4 MG SL tablet, up to max of 3 total doses. If no relief after 1 dose, call 911., Disp: 25 tablet, Rfl: 3    Insulin Pen Needle (UNIFINE PENTIPS) 32G X 4 MM MISC, USE AS DIRECTED 3 TIMES A DAY, Disp: 100 each, Rfl: 6    cetirizine (ZYRTEC) 10 MG tablet, Take 10 mg by mouth daily, Disp: , Rfl:     lisinopril (PRINIVIL;ZESTRIL) 10 MG tablet, Take 10 mg by mouth daily, Disp: , Rfl:     Blood Pressure Monitor GINA, Check BP at home once or twice a day, Disp: 1 Device, Rfl: 0    Insulin Syringe-Needle U-100 31G X 5/16\" 0.3 ML MISC, USE 3 TIMES A DAY BEFORE MEALS, Disp: 90 each, Rfl: 7    calcium carbonate-vitamin D (CALTRATE) 600-400 MG-UNIT TABS per tab, 1 tablet daily , Disp: , Rfl:     glucagon 1 MG injection, Inject 1 mg into the muscle See Admin Instructions Follow package directions for low blood sugar., Disp: 1 kit, Rfl: 0    aspirin 81 MG tablet, Take 81 mg by mouth daily, Disp: , Rfl:     gabapentin (NEURONTIN) 600 MG tablet, Take 600 mg by mouth 3 times daily, Disp: , Rfl:     traZODone (DESYREL) 100 MG tablet, Take 100 mg by mouth nightly, Disp: , Rfl:     TRUE METRIX BLOOD GLUCOSE TEST strip, USE AS DIRECTED TO TEST 4 TIMES A DAY, Disp: 100 strip, Rfl: 5      Review of Systems:    Constitutional: Negative for fever, chills, and unexpected weight change. HENT: Negative for congestion, ear pain, rhinorrhea,  sore throat and trouble swallowing.    Eyes: Negative for photophobia, redness, itching. Respiratory: Negative for cough, shortness of breath and sputum. Cardiovascular: Negative for chest pain, palpitations and leg swelling. Gastrointestinal: Negative for nausea, vomiting, abdominal pain, diarrhea, constipation. Endocrine: Negative for cold intolerance, heat intolerance, polydipsia, polyphagia and polyuria. Genitourinary: Negative for dysuria, urgency, frequency, hematuria and flank pain. Musculoskeletal: Negative for myalgias, back pain, arthralgias and neck pain. Skin/Nail: Negative for rash, itching. Normal nails. Neurological: Negative for seizures, weakness, light-headedness, numbness and headaches. Hematological/ Lymph nodes: Negative for adenopathy. Does not bruise/bleed easily. Psychiatric/Behavioral: Negative for suicidal ideas, depression, anxiety, sleep disturbance and decreased concentration. Objective:   Physical Exam:  BP (!) 149/81 (Site: Right Upper Arm, Position: Sitting, Cuff Size: Medium Adult)   Pulse 82   Temp 96.8 °F (36 °C) (Temporal)   Ht 5' 3\" (1.6 m)   Wt 115 lb (52.2 kg)   SpO2 100%   BMI 20.37 kg/m²     Constitutional: Patient is oriented to person, place, and time. Patient appears well-developed and well-nourished. HENT:               Head: Normocephalic and atraumatic. Eyes: Conjunctivae and EOM are normal.                Neck: Normal range of motion. Thyroid exam normal.   Cardiovascular: Normal rate, regular rhythm and normal heart sounds. Pulmonary/Chest: Effort normal and breath sounds normal.   Abdominal: Soft. Bowel sounds are normal.   Musculoskeletal: Normal range of motion. Neurological: Patient is alert and oriented to person, place, and time. Patient has normal reflexes. Skin: Skin is warm and dry. Psychiatric: Patient has a normal mood and affect. Patient behavior is normal.     Lab Review:    No results displayed because visit has over 200 results.       Admission on 02/08/2021, Discharged on 02/08/2021   Component Date Value Ref Range Status    Ventricular Rate 02/08/2021 68  BPM Final    Atrial Rate 02/08/2021 68  BPM Final    P-R Interval 02/08/2021 148  ms Final    QRS Duration 02/08/2021 72  ms Final    Q-T Interval 02/08/2021 408  ms Final    QTc Calculation (Bazett) 02/08/2021 433  ms Final    P Axis 02/08/2021 53  degrees Final    R Axis 02/08/2021 0  degrees Final    T Axis 02/08/2021 38  degrees Final    Diagnosis 02/08/2021 Normal sinus rhythmNormal ECGWhen compared with ECG of 26-JAN-2021 16:29,No significant change was foundConfirmed by Cynthia Phillips ([id number]) on 2/9/2021 2:43:04 PM   Final    WBC 02/08/2021 5.9  4.0 - 11.0 K/uL Final    RBC 02/08/2021 2.88* 4.00 - 5.20 M/uL Final    Hemoglobin 02/08/2021 8.7* 12.0 - 16.0 g/dL Final    Hematocrit 02/08/2021 27.2* 36.0 - 48.0 % Final    MCV 02/08/2021 94.3  80.0 - 100.0 fL Final    MCH 02/08/2021 30.2  26.0 - 34.0 pg Final    MCHC 02/08/2021 32.0  31.0 - 36.0 g/dL Final    RDW 02/08/2021 16.5* 12.4 - 15.4 % Final    Platelets 49/89/9119 245  135 - 450 K/uL Final    MPV 02/08/2021 9.1  5.0 - 10.5 fL Final    Neutrophils % 02/08/2021 75.6  % Final    Lymphocytes % 02/08/2021 16.9  % Final    Monocytes % 02/08/2021 6.6  % Final    Eosinophils % 02/08/2021 0.6  % Final    Basophils % 02/08/2021 0.3  % Final    Neutrophils Absolute 02/08/2021 4.4  1.7 - 7.7 K/uL Final    Lymphocytes Absolute 02/08/2021 1.0  1.0 - 5.1 K/uL Final    Monocytes Absolute 02/08/2021 0.4  0.0 - 1.3 K/uL Final    Eosinophils Absolute 02/08/2021 0.0  0.0 - 0.6 K/uL Final    Basophils Absolute 02/08/2021 0.0  0.0 - 0.2 K/uL Final    Sodium 02/08/2021 139  136 - 145 mmol/L Final    Potassium 02/08/2021 4.3  3.5 - 5.1 mmol/L Final    Chloride 02/08/2021 101  99 - 110 mmol/L Final    CO2 02/08/2021 28  21 - 32 mmol/L Final    Anion Gap 02/08/2021 10  3 - 16 Final    Glucose 02/08/2021 311* 70 - 99 mg/dL Final    BUN 26.0 - 34.0 pg Final    MCHC 01/07/2021 29.8* 31.0 - 36.0 g/dL Final    RDW 01/07/2021 14.8  12.4 - 15.4 % Final    Platelets 28/60/1914 217  135 - 450 K/uL Final    MPV 01/07/2021 10.1  5.0 - 10.5 fL Final    Neutrophils % 01/07/2021 69.4  % Final    Lymphocytes % 01/07/2021 22.7  % Final    Monocytes % 01/07/2021 7.1  % Final    Eosinophils % 01/07/2021 0.5  % Final    Basophils % 01/07/2021 0.3  % Final    Neutrophils Absolute 01/07/2021 3.8  1.7 - 7.7 K/uL Final    Lymphocytes Absolute 01/07/2021 1.3  1.0 - 5.1 K/uL Final    Monocytes Absolute 01/07/2021 0.4  0.0 - 1.3 K/uL Final    Eosinophils Absolute 01/07/2021 0.0  0.0 - 0.6 K/uL Final    Basophils Absolute 01/07/2021 0.0  0.0 - 0.2 K/uL Final    Sodium 01/07/2021 129* 136 - 145 mmol/L Final    Potassium reflex Magnesium 01/07/2021 4.5  3.5 - 5.1 mmol/L Final    Chloride 01/07/2021 93* 99 - 110 mmol/L Final    CO2 01/07/2021 20* 21 - 32 mmol/L Final    Anion Gap 01/07/2021 16  3 - 16 Final    Glucose 01/07/2021 621* 70 - 99 mg/dL Final    BUN 01/07/2021 21* 7 - 20 mg/dL Final    CREATININE 01/07/2021 1.1  0.6 - 1.2 mg/dL Final    GFR Non- 01/07/2021 50* >60 Final    GFR  01/07/2021 >60  >60 Final    Calcium 01/07/2021 9.0  8.3 - 10.6 mg/dL Final    Total Protein 01/07/2021 8.2  6.4 - 8.2 g/dL Final    Albumin 01/07/2021 3.5  3.4 - 5.0 g/dL Final    Albumin/Globulin Ratio 01/07/2021 0.7* 1.1 - 2.2 Final    Total Bilirubin 01/07/2021 0.3  0.0 - 1.0 mg/dL Final    Alkaline Phosphatase 01/07/2021 316* 40 - 129 U/L Final    ALT 01/07/2021 35  10 - 40 U/L Final    AST 01/07/2021 26  15 - 37 U/L Final    Globulin 01/07/2021 4.7  g/dL Final    Color, UA 01/07/2021 Straw  Straw/Yellow Final    Clarity, UA 01/07/2021 Clear  Clear Final    Glucose, Ur 01/07/2021 >=1000* Negative mg/dL Final    Bilirubin Urine 01/07/2021 Negative  Negative Final    Ketones, Urine 01/07/2021 Negative  Negative mg/dL Final    Specific Gravity, UA 01/07/2021 <=1.005  1.005 - 1.030 Final    Blood, Urine 01/07/2021 Negative  Negative Final    pH, UA 01/07/2021 5.0  5.0 - 8.0 Final    Protein, UA 01/07/2021 Negative  Negative mg/dL Final    Urobilinogen, Urine 01/07/2021 0.2  <2.0 E.U./dL Final    Nitrite, Urine 01/07/2021 Negative  Negative Final    Leukocyte Esterase, Urine 01/07/2021 Negative  Negative Final    Microscopic Examination 01/07/2021 Not Indicated   Final    Urine Type 01/07/2021 NotGiven   Final    Urine Reflex to Culture 01/07/2021 Not Indicated   Final    Magnesium 01/07/2021 2.00  1.80 - 2.40 mg/dL Final    Phosphorus 01/07/2021 3.3  2.5 - 4.9 mg/dL Final    Beta-Hydroxybutyrate 01/07/2021 1.39* 0.00 - 0.27 mmol/L Final    POC Glucose 01/07/2021 394* 70 - 99 mg/dl Final    Performed on 01/07/2021 ACCU-CHEK   Final   Admission on 11/17/2020, Discharged on 11/17/2020   Component Date Value Ref Range Status    POC Glucose 11/17/2020 >600* 70 - 99 mg/dl Final    Performed on 11/17/2020 ACCU-CHEK   Final    WBC 11/17/2020 5.8  4.0 - 11.0 K/uL Final    RBC 11/17/2020 3.03* 4.00 - 5.20 M/uL Final    Hemoglobin 11/17/2020 8.8* 12.0 - 16.0 g/dL Final    Hematocrit 11/17/2020 29.0* 36.0 - 48.0 % Final    MCV 11/17/2020 95.8  80.0 - 100.0 fL Final    MCH 11/17/2020 29.2  26.0 - 34.0 pg Final    MCHC 11/17/2020 30.4* 31.0 - 36.0 g/dL Final    RDW 11/17/2020 14.4  12.4 - 15.4 % Final    Platelets 57/94/3866 258  135 - 450 K/uL Final    MPV 11/17/2020 9.6  5.0 - 10.5 fL Final    Neutrophils % 11/17/2020 72.0  % Final    Lymphocytes % 11/17/2020 22.1  % Final    Monocytes % 11/17/2020 4.7  % Final    Eosinophils % 11/17/2020 0.5  % Final    Basophils % 11/17/2020 0.7  % Final    Neutrophils Absolute 11/17/2020 4.2  1.7 - 7.7 K/uL Final    Lymphocytes Absolute 11/17/2020 1.3  1.0 - 5.1 K/uL Final    Monocytes Absolute 11/17/2020 0.3  0.0 - 1.3 K/uL Final    Eosinophils Absolute 11/17/2020 0.0 0.0 - 0.6 K/uL Final    Basophils Absolute 11/17/2020 0.0  0.0 - 0.2 K/uL Final    Sodium 11/17/2020 129* 136 - 145 mmol/L Final    Potassium 11/17/2020 4.5  3.5 - 5.1 mmol/L Final    Chloride 11/17/2020 95* 99 - 110 mmol/L Final    CO2 11/17/2020 23  21 - 32 mmol/L Final    Anion Gap 11/17/2020 11  3 - 16 Final    Glucose 11/17/2020 700* 70 - 99 mg/dL Final    BUN 11/17/2020 11  7 - 20 mg/dL Final    CREATININE 11/17/2020 1.4* 0.6 - 1.2 mg/dL Final    GFR Non- 11/17/2020 38* >60 Final    GFR  11/17/2020 46* >60 Final    Calcium 11/17/2020 9.0  8.3 - 10.6 mg/dL Final    Total Protein 11/17/2020 7.3  6.4 - 8.2 g/dL Final    Albumin 11/17/2020 3.9  3.4 - 5.0 g/dL Final    Albumin/Globulin Ratio 11/17/2020 1.1  1.1 - 2.2 Final    Total Bilirubin 11/17/2020 <0.2  0.0 - 1.0 mg/dL Final    Alkaline Phosphatase 11/17/2020 314* 40 - 129 U/L Final    ALT 11/17/2020 42* 10 - 40 U/L Final    AST 11/17/2020 54* 15 - 37 U/L Final    Globulin 11/17/2020 3.4  g/dL Final    pH, [facility] 11/17/2020 7.314* 7.350 - 7.450 Final    pCO2, [facility] 11/17/2020 44.5  40.0 - 50.0 mmHg Final    pO2, [facility] 11/17/2020 47.4* 25 - 40 mmHg Final    HCO3, Venous 11/17/2020 22.1* 23.0 - 29.0 mmol/L Final    Base Excess, [facility] 11/17/2020 -3.9* -3.0 - 3.0 mmol/L Final    O2 Sat, [facility] 11/17/2020 82  Not Established % Final    Carboxyhemoglobin 11/17/2020 4.1* 0.0 - 1.5 % Final    MetHgb, Kyle 11/17/2020 0.2  <1.5 % Final    TC02 (Calc), Kyle 11/17/2020 24  Not Established mmol/L Final    O2 Content, Kyle 11/17/2020 11  Not Established VOL % Final    O2 Therapy 11/17/2020 Unknown   Final    Color, UA 11/17/2020 Yellow  Straw/Yellow Final    Clarity, UA 11/17/2020 Clear  Clear Final    Glucose, Ur 11/17/2020 >=1000* Negative mg/dL Final    Bilirubin Urine 11/17/2020 Negative  Negative Final    Ketones, Urine 11/17/2020 Negative  Negative mg/dL Final    Specific [place], UA 11/17/2020 1.010  1.005 - 1.030 Final    Blood, Urine 11/17/2020 Negative  Negative Final    pH, UA 11/17/2020 5.5  5.0 - 8.0 Final    Protein, UA 11/17/2020 Negative  Negative mg/dL Final    Urobilinogen, Urine 11/17/2020 0.2  <2.0 E.U./dL Final    Nitrite, Urine 11/17/2020 Negative  Negative Final    Leukocyte Esterase, Urine 11/17/2020 Negative  Negative Final    Microscopic Examination 11/17/2020 Not Indicated   Final    Urine Type 11/17/2020 NotGiven   Final    Glucose 11/17/2020 342  mg/dL Final    POC Glucose 11/17/2020 440* 70 - 99 mg/dl Final    Performed on 11/17/2020 ACCU-CHEK   Final    POC Glucose 11/17/2020 342* 70 - 99 mg/dl Final    Performed on 11/17/2020 ACCU-CHEK   Final   Admission on 11/11/2020, Discharged on 11/12/2020   Component Date Value Ref Range Status    Rapid Influenza A Ag 11/11/2020 Negative  Negative Final    Rapid Influenza B Ag 11/11/2020 Negative  Negative Final    Rapid Strep A Screen 11/11/2020 Negative  Negative Final    SARS-CoV-2, [facility]AT 11/11/2020 Not Detected  Not Detected Final    Strep A Culture 11/11/2020 Normal oral katherine, No Beta Strep isolated   Final    Glucose 11/12/2020 162  mg/dL Final    POC Glucose 11/11/2020 434* 70 - 99 mg/dl Final    Performed on 11/11/2020 ACCU-CHEK   Final    WBC 11/11/2020 7.6  4.0 - 11.0 K/uL Final    RBC 11/11/2020 3.65* 4.00 - 5.20 M/uL Final    Hemoglobin 11/11/2020 10.4* 12.0 - 16.0 g/dL Final    Hematocrit 11/11/2020 33.8* 36.0 - 48.0 % Final    MCV 11/11/2020 92.4  80.0 - 100.0 fL Final    MCH 11/11/2020 28.5  26.0 - 34.0 pg Final    MCHC 11/11/2020 30.9* 31.0 - 36.0 g/dL Final    RDW 11/11/2020 13.7  12.4 - 15.4 % Final    Platelets 33/30/1101 220  135 - 450 K/uL Final    MPV 11/11/2020 10.0  5.0 - 10.5 fL Final    Neutrophils % 11/11/2020 68.6  % Final    Lymphocytes % 11/11/2020 22.6  % Final    Monocytes % 11/11/2020 8.0  % Final    Eosinophils % 11/11/2020 0.4  % Final    Basophils % 11/11/2020 0.4  % Final    Neutrophils Absolute 11/11/2020 5.2  1.7 - 7.7 K/uL Final    Lymphocytes Absolute 11/11/2020 1.7  1.0 - 5.1 K/uL Final    Monocytes Absolute 11/11/2020 0.6  0.0 - 1.3 K/uL Final    Eosinophils Absolute 11/11/2020 0.0  0.0 - 0.6 K/uL Final    Basophils Absolute 11/11/2020 0.0  0.0 - 0.2 K/uL Final    Sodium 11/11/2020 134* 136 - 145 mmol/L Final    Potassium reflex Magnesium 11/11/2020 3.9  3.5 - 5.1 mmol/L Final    Chloride 11/11/2020 97* 99 - 110 mmol/L Final    CO2 11/11/2020 22  21 - 32 mmol/L Final    Anion Gap 11/11/2020 15  3 - 16 Final    Glucose 11/11/2020 377* 70 - 99 mg/dL Final    BUN 11/11/2020 16  7 - 20 mg/dL Final    CREATININE 11/11/2020 1.2  0.6 - 1.2 mg/dL Final    GFR Non- 11/11/2020 46* >60 Final    GFR  11/11/2020 55* >60 Final    Calcium 11/11/2020 9.8  8.3 - 10.6 mg/dL Final    Total Protein 11/11/2020 7.9  6.4 - 8.2 g/dL Final    Albumin 11/11/2020 3.3* 3.4 - 5.0 g/dL Final    Albumin/Globulin Ratio 11/11/2020 0.7* 1.1 - 2.2 Final    Total Bilirubin 11/11/2020 0.4  0.0 - 1.0 mg/dL Final    Alkaline Phosphatase 11/11/2020 201* 40 - 129 U/L Final    ALT 11/11/2020 28  10 - 40 U/L Final    AST 11/11/2020 18  15 - 37 U/L Final    Globulin 11/11/2020 4.6  g/dL Final    Color, UA 11/11/2020 Straw  Straw/Yellow Final    Clarity, UA 11/11/2020 SL CLOUDY* Clear Final    Glucose, Ur 11/11/2020 >=1000* Negative mg/dL Final    Bilirubin Urine 11/11/2020 Negative  Negative Final    Ketones, Urine 11/11/2020 TRACE* Negative mg/dL Final    Specific [place], UA 11/11/2020 <=1.005  1.005 - 1.030 Final    Blood, Urine 11/11/2020 Negative  Negative Final    pH, UA 11/11/2020 5.5  5.0 - 8.0 Final    Protein, UA 11/11/2020 Negative  Negative mg/dL Final    Urobilinogen, Urine 11/11/2020 0.2  <2.0 E.U./dL Final    Nitrite, Urine 11/11/2020 POSITIVE* Negative Final    Leukocyte Esterase, Urine 11/11/2020 Negative  Negative Final    Microscopic Examination % 10/07/2020 76.4  % Final    Lymphocytes % 10/07/2020 19.0  % Final    Monocytes % 10/07/2020 4.0  % Final    Eosinophils % 10/07/2020 0.1  % Final    Basophils % 10/07/2020 0.5  % Final    Neutrophils Absolute 10/07/2020 5.1  1.7 - 7.7 K/uL Final    Lymphocytes Absolute 10/07/2020 1.3  1.0 - 5.1 K/uL Final    Monocytes Absolute 10/07/2020 0.3  0.0 - 1.3 K/uL Final    Eosinophils Absolute 10/07/2020 0.0  0.0 - 0.6 K/uL Final    Basophils Absolute 10/07/2020 0.0  0.0 - 0.2 K/uL Final    Sodium 10/07/2020 127* 136 - 145 mmol/L Final    Potassium 10/07/2020 4.4  3.5 - 5.1 mmol/L Final    Chloride 10/07/2020 96* 99 - 110 mmol/L Final    CO2 10/07/2020 15* 21 - 32 mmol/L Final    Anion Gap 10/07/2020 16  3 - 16 Final    Glucose 10/07/2020 634* 70 - 99 mg/dL Final    BUN 10/07/2020 35* 7 - 20 mg/dL Final    CREATININE 10/07/2020 1.3* 0.6 - 1.2 mg/dL Final    GFR Non- 10/07/2020 42* >60 Final    GFR  10/07/2020 50* >60 Final    Calcium 10/07/2020 9.0  8.3 - 10.6 mg/dL Final    Total Protein 10/07/2020 7.0  6.4 - 8.2 g/dL Final    Albumin 10/07/2020 3.7  3.4 - 5.0 g/dL Final    Albumin/Globulin Ratio 10/07/2020 1.1  1.1 - 2.2 Final    Total Bilirubin 10/07/2020 0.4  0.0 - 1.0 mg/dL Final    Alkaline Phosphatase 10/07/2020 167* 40 - 129 U/L Final    ALT 10/07/2020 42* 10 - 40 U/L Final    AST 10/07/2020 24  15 - 37 U/L Final    Globulin 10/07/2020 3.3  g/dL Final    pH, Kyle 10/07/2020 7.328* 7.350 - 7.450 Final    pCO2, Kyle 10/07/2020 31.0* 40.0 - 50.0 mmHg Final    pO2, Kyle 10/07/2020 162.1* 25 - 40 mmHg Final    HCO3, Venous 10/07/2020 15.9* 23.0 - 29.0 mmol/L Final    Base Excess, Kyle 10/07/2020 -9.0* -3.0 - 3.0 mmol/L Final    O2 Sat, Kyle 10/07/2020 98  Not Established % Final    Carboxyhemoglobin 10/07/2020 2.2* 0.0 - 1.5 % Final    MetHgb, Kyle 10/07/2020 0.1  <1.5 % Final    TC02 (Calc), Kyle 10/07/2020 17  Not Established mmol/L Final    O2 Content, Kyle 10/07/2020 15  Not Established VOL % Final    O2 Therapy 10/07/2020 Unknown   Final    Phosphorus 10/07/2020 3.6  2.5 - 4.9 mg/dL Final    Lactic Acid 10/07/2020 1.8  0.4 - 2.0 mmol/L Final    Ventricular Rate 10/07/2020 62  BPM Final    Atrial Rate 10/07/2020 62  BPM Final    P-R Interval 10/07/2020 144  ms Final    QRS Duration 10/07/2020 82  ms Final    Q-T Interval 10/07/2020 408  ms Final    QTc Calculation ([place]) 10/07/2020 414  ms Final    P Axis 10/07/2020 55  degrees Final    R Axis 10/07/2020 -32  degrees Final    T Axis 10/07/2020 44  degrees Final    Diagnosis 10/07/2020 Normal sinus rhythm with sinus arrhythmiaLeft axis deviationAbnormal ECGWhen compared with ECG of 11-JUL-2020 07:49,No significant change was foundConfirmed by John Reveles MD, Bridget Marion ([id number]) on 10/8/2020 4:38:16 PM   Final    POC Glucose 10/07/2020 576* 70 - 99 mg/dl Final    Performed on 10/07/2020 ACCU-CHEK   Final    Color, UA 10/07/2020 Yellow  Straw/Yellow Final    Clarity, UA 10/07/2020 Clear  Clear Final    Glucose, Ur 10/07/2020 >=1000* Negative mg/dL Final    Bilirubin Urine 10/07/2020 Negative  Negative Final    Ketones, Urine 10/07/2020 TRACE* Negative mg/dL Final    Specific [place], UA 10/07/2020 1.010  1.005 - 1.030 Final    Blood, Urine 10/07/2020 SMALL* Negative Final    pH, UA 10/07/2020 5.5  5.0 - 8.0 Final    Protein, UA 10/07/2020 Negative  Negative mg/dL Final    Urobilinogen, Urine 10/07/2020 0.2  <2.0 E.U./dL Final    Nitrite, Urine 10/07/2020 Negative  Negative Final    Leukocyte Esterase, Urine 10/07/2020 Negative  Negative Final    Microscopic Examination 10/07/2020 YES   Final    Urine Type 10/07/2020 NotGiven   Final    Urine Reflex to Culture 10/07/2020 Not Indicated   Final    WBC, UA 10/07/2020 0-2  0 - 5 /HPF Final    RBC, UA 10/07/2020 5-10* 0 - 4 /HPF Final    Epithelial Cells, UA 10/07/2020 0-1  0 - 5 /HPF Final    Bacteria, UA 10/07/2020 Rare* None Seen /HPF Final    Amorphous, UA 10/07/2020 Rare  /HPF Final    Yeast, UA 10/07/2020 Present* None Seen /HPF Final    Magnesium 10/07/2020 1.90  1.80 - 2.40 mg/dL Final    Troponin 10/07/2020 <0.01  <0.01 ng/mL Final    POC Glucose 10/07/2020 544* 70 - 99 mg/dl Final    Performed on 10/07/2020 ACCU-CHEK   Final    Sodium 10/07/2020 137  136 - 145 mmol/L Final    Potassium 10/07/2020 3.0* 3.5 - 5.1 mmol/L Final    Chloride 10/07/2020 104  99 - 110 mmol/L Final    CO2 10/07/2020 21  21 - 32 mmol/L Final    Anion Gap 10/07/2020 12  3 - 16 Final    Glucose 10/07/2020 127* 70 - 99 mg/dL Final    BUN 10/07/2020 28* 7 - 20 mg/dL Final    CREATININE 10/07/2020 1.4* 0.6 - 1.2 mg/dL Final    GFR Non- 10/07/2020 38* >60 Final    GFR  10/07/2020 46* >60 Final    Calcium 10/07/2020 9.0  8.3 - 10.6 mg/dL Final    Sodium 10/08/2020 140  136 - 145 mmol/L Final    Potassium 10/08/2020 3.3* 3.5 - 5.1 mmol/L Final    Chloride 10/08/2020 109  99 - 110 mmol/L Final    CO2 10/08/2020 20* 21 - 32 mmol/L Final    Anion Gap 10/08/2020 11  3 - 16 Final    Glucose 10/08/2020 186* 70 - 99 mg/dL Final    BUN 10/08/2020 28* 7 - 20 mg/dL Final    CREATININE 10/08/2020 1.2  0.6 - 1.2 mg/dL Final    GFR Non- 10/08/2020 46* >60 Final    GFR  10/08/2020 55* >60 Final    Calcium 10/08/2020 8.7  8.3 - 10.6 mg/dL Final    Sodium 10/08/2020 137  136 - 145 mmol/L Final    Potassium 10/08/2020 4.1  3.5 - 5.1 mmol/L Final    Chloride 10/08/2020 107  99 - 110 mmol/L Final    CO2 10/08/2020 21  21 - 32 mmol/L Final    Anion Gap 10/08/2020 9  3 - 16 Final    Glucose 10/08/2020 213* 70 - 99 mg/dL Final    BUN 10/08/2020 25* 7 - 20 mg/dL Final    CREATININE 10/08/2020 1.2  0.6 - 1.2 mg/dL Final    GFR Non- 10/08/2020 46* >60 Final    GFR  10/08/2020 55* >60 Final    Calcium 10/08/2020 8.6  8.3 - 10.6 mg/dL Final    Magnesium 10/07/2020 1. 90  1.80 - 2.40 mg/dL Final    Magnesium 10/08/2020 1.90  1.80 - 2.40 mg/dL Final    Magnesium 10/08/2020 1.90  1.80 - 2.40 mg/dL Final    Phosphorus 10/07/2020 2.6  2.5 - 4.9 mg/dL Final    Phosphorus 10/08/2020 3.1  2.5 - 4.9 mg/dL Final    Phosphorus 10/08/2020 3.2  2.5 - 4.9 mg/dL Final    POC Glucose 10/07/2020 419* 70 - 99 mg/dl Final    Performed on 10/07/2020 ACCU-CHEK   Final    POC Glucose 10/07/2020 305* 70 - 99 mg/dl Final    Performed on 10/07/2020 ACCU-CHEK   Final    POC Glucose 10/07/2020 195* 70 - 99 mg/dl Final    Performed on 10/07/2020 ACCU-CHEK   Final    POC Glucose 10/07/2020 126* 70 - 99 mg/dl Final    Performed on 10/07/2020 ACCU-CHEK   Final    POC Glucose 10/08/2020 136* 70 - 99 mg/dl Final    Performed on 10/08/2020 ACCU-CHEK   Final    POC Glucose 10/08/2020 165* 70 - 99 mg/dl Final    Performed on 10/08/2020 ACCU-CHEK   Final    POC Glucose 10/08/2020 180* 70 - 99 mg/dl Final    Performed on 10/08/2020 ACCU-CHEK   Final    POC Glucose 10/08/2020 191* 70 - 99 mg/dl Final    Performed on 10/08/2020 ACCU-CHEK   Final    POC Glucose 10/08/2020 186* 70 - 99 mg/dl Final    Performed on 10/08/2020 ACCU-CHEK   Final    POC Glucose 10/08/2020 209* 70 - 99 mg/dl Final    Performed on 10/08/2020 ACCU-CHEK   Final    Hemoglobin A1C 10/08/2020 11.9  See comment % Final    eAG 10/08/2020 294.8  mg/dL Final    POC Glucose 10/08/2020 172* 70 - 99 mg/dl Final    Performed on 10/08/2020 ACCU-CHEK   Final    POC Glucose 10/08/2020 138* 70 - 99 mg/dl Final    Performed on 10/08/2020 ACCU-CHEK   Final   Office Visit on 08/06/2020   Component Date Value Ref Range Status    SARS-CoV-2, ANTONI 08/06/2020 NOT DETECTED  NOT DETECTED Final   Admission on 08/04/2020, Discharged on 08/05/2020   Component Date Value Ref Range Status    POC Glucose 08/04/2020 587* 70 - 99 mg/dl Final    Performed on 08/04/2020 ACCU-CHEK   Final    WBC 08/04/2020 6.3  4.0 - 11.0 K/uL Final    RBC 08/04/2020 3.78* 4.00 - 5.20 M/uL Final    Hemoglobin 08/04/2020 10.4* 12.0 - 16.0 g/dL Final    Hematocrit 08/04/2020 34.0* 36.0 - 48.0 % Final    MCV 08/04/2020 89.9  80.0 - 100.0 fL Final    MCH 08/04/2020 27.4  26.0 - 34.0 pg Final    MCHC 08/04/2020 30.5* 31.0 - 36.0 g/dL Final    RDW 08/04/2020 15.0  12.4 - 15.4 % Final    Platelets 48/41/5845 183  135 - 450 K/uL Final    MPV 08/04/2020 9.6  5.0 - 10.5 fL Final    Neutrophils % 08/04/2020 59.9  % Final    Lymphocytes % 08/04/2020 32.4  % Final    Monocytes % 08/04/2020 6.7  % Final    Eosinophils % 08/04/2020 0.8  % Final    Basophils % 08/04/2020 0.2  % Final    Neutrophils Absolute 08/04/2020 3.8  1.7 - 7.7 K/uL Final    Lymphocytes Absolute 08/04/2020 2.0  1.0 - 5.1 K/uL Final    Monocytes Absolute 08/04/2020 0.4  0.0 - 1.3 K/uL Final    Eosinophils Absolute 08/04/2020 0.0  0.0 - 0.6 K/uL Final    Basophils Absolute 08/04/2020 0.0  0.0 - 0.2 K/uL Final    Sodium 08/04/2020 131* 136 - 145 mmol/L Final    Potassium reflex Magnesium 08/04/2020 4.4  3.5 - 5.1 mmol/L Final    Chloride 08/04/2020 96* 99 - 110 mmol/L Final    CO2 08/04/2020 20* 21 - 32 mmol/L Final    Anion Gap 08/04/2020 15  3 - 16 Final    Glucose 08/04/2020 662* 70 - 99 mg/dL Final    BUN 08/04/2020 17  7 - 20 mg/dL Final    CREATININE 08/04/2020 1.2  0.6 - 1.2 mg/dL Final    GFR Non- 08/04/2020 46* >60 Final    GFR  08/04/2020 55* >60 Final    Calcium 08/04/2020 9.2  8.3 - 10.6 mg/dL Final    Total Protein 08/04/2020 7.5  6.4 - 8.2 g/dL Final    Albumin 08/04/2020 4.0  3.4 - 5.0 g/dL Final    Albumin/Globulin Ratio 08/04/2020 1.1  1.1 - 2.2 Final    Total Bilirubin 08/04/2020 <0.2  0.0 - 1.0 mg/dL Final    Alkaline Phosphatase 08/04/2020 201* 40 - 129 U/L Final    ALT 08/04/2020 43* 10 - 40 U/L Final    AST 08/04/2020 35  15 - 37 U/L Final    Globulin 08/04/2020 3.5  g/dL Final    Color, UA 08/04/2020 Yellow  Straw/Yellow Final    Clarity, UA 08/04/2020 Clear  Clear Final    Glucose, Ur 08/04/2020 >=1000* Negative mg/dL Final    Bilirubin Urine 08/04/2020 Negative  Negative Final    Ketones, Urine 08/04/2020 Negative  Negative mg/dL Final    Specific Gravity, UA 08/04/2020 1.010  1.005 - 1.030 Final    Blood, Urine 08/04/2020 Negative  Negative Final    pH, UA 08/04/2020 5.5  5.0 - 8.0 Final    Protein, UA 08/04/2020 Negative  Negative mg/dL Final    Urobilinogen, Urine 08/04/2020 0.2  <2.0 E.U./dL Final    Nitrite, Urine 08/04/2020 Negative  Negative Final    Leukocyte Esterase, Urine 08/04/2020 Negative  Negative Final    Microscopic Examination 08/04/2020 Not Indicated   Final    Urine Type 08/04/2020 NotGiven   Final    Urine Reflex to Culture 08/04/2020 Not Indicated   Final    pH, [place] 08/04/2020 7.315* 7.350 - 7.450 Final    pCO2, [place] 08/04/2020 36.0* 40.0 - 50.0 mmHg Final    pO2, Kyle 08/04/2020 81.4* 25 - 40 mmHg Final    HCO3, Venous 08/04/2020 17.9* 23.0 - 29.0 mmol/L Final    Base Excess, [place] 08/04/2020 -7.5* -3.0 - 3.0 mmol/L Final    O2 Sat, Kyle 08/04/2020 95  Not Established % Final    Carboxyhemoglobin 08/04/2020 1.7* 0.0 - 1.5 % Final    MetHgb, [place] 08/04/2020 0.6  <1.5 % Final    TC02 (Calc), [place] 08/04/2020 19  Not Established mmol/L Final    O2 Content, Kyle 08/04/2020 15  Not Established VOL % Final    O2 Therapy 08/04/2020 Unknown   Final    Glucose 08/05/2020 379  mg/dL Final    POC Glucose 08/05/2020 520* 70 - 99 mg/dl Final    Performed on 08/05/2020 ACCU-CHEK   Final    POC Glucose 08/05/2020 379* 70 - 99 mg/dl Final    Performed on 08/05/2020 ACCU-CHEK   Final   There may be more visits with results that are not included. Assessment and Plan     Ioana Stewart was seen today for diabetes.     Diagnoses and all orders for this visit:    Uncontrolled type 2 diabetes mellitus with microalbuminuria, with long-term current use of insulin ([facility] [address]    Uncontrolled type 2 diabetes mellitus with diabetic nephropathy, with long-term current use of insulin ([facility] [address]    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 ([facility] [address]    Dyslipidemia associated with type 2 diabetes mellitus ([facility] [address]    Essential hypertension          1: Type 2 DM complicated with nephropathy, neuropathy TIAs, carotid stenosis   Uncontrolled A1C 9.3% (after transfusion) < 11.9% < 7.3% < 8.4%<  6.8% < 10.1%  << 8.1 <  8% < 11.7%    Cr 1.2, GFR 55 Oct 2020   A1C of <8 would be acceptable because of microvascular and macrovascular complications. Questionable adherence to medical plan, treatment adherence and diet plan   [facility] not approved     Need to bring meds on every visit      Need to bring meter on the visit   No blood sugars available today     Will keep Synjardy and Victoza off     Change Lantus to 30 units daily     Keep Humalog off     Reiterated importance of taking care meds to control diabetes     All instructions provided in written. Check Blood sugars 4 times per day. Log them along with insulin and send them every 2 weeks. Call for blood sugars less than 60 or more than 400. Eye exam: Last exam in March 12th 2018, Needs an exam now. Foot exam:  March 2020    Deformity/amputation: absent  Skin lesions/pre-ulcerative calluses: absent  Edema: right- negative, left- negative  Sensory exam: Monofilament sensation: normal  Plus at least one of the following:   Pulses: normal,   Vibration (128 Hz):  Moderately decreased     Renal screen: High 68 Feb 2018, high 47 Jan 2019  > 40 June 2020     TSH screen: Feb 2018   Saw CDE in 2016 with Rd.     2: HTN   Controlled     3: Hyperlipidemia   LDL: 33, HDL: 39, TGs: 118 June 2020      Atorvastatin 10 mg daily     4: Unintentional weigh tloss - good appetite   TSH, FT4, am cortisol normal - Nov 2020    Following up with pcp     RTC in 4 weeks with logs       EDUCATION:   Greater than 50% of this follow-up visit was spent in general counseling regarding   obesity, diet, exercise, importance of adherence to insulin regime, recognition and treatment of hypo and hyperglycemia,  glucose logging, proper diabetes management, diabetic complications with poor management and the importance of glycemic control in order to avoid the complications of diabetes. Risks and potential complications of diabetes were reviewed with the patient. Diabetes health maintenance plan and follow-up were discussed and understood by the patient. We reviewed the importance of medication compliance and regular follow-up. Aggressive lifestyle modification was encouraged. Exercise Counselling: This patient is a candidate for regular physical exercise. Instructions to perform the following types of exercise:  Swimming or water aerobic exercise  Brisk walking  Playing tennis  Stationary bicycle or elliptical indoor  Low impact aerobic exercise    Instructions given to exercise for the following duration:  30 minutes a day for five-seven days per week.     Following instructions for being active throughout the day in addition to formal exercise:  Walk instead of drive whenever possible  Take the stairs instead of the elevator  Work in the garden  Park to the far end of the parking lot to add more walking steps to destination      Electronically signed by Peter Centeno MD on 3/9/2021 at 8:32 AM admission

## 2021-03-10 LAB
BLOOD CULTURE, ROUTINE: NORMAL
CULTURE, BLOOD 2: NORMAL

## 2021-03-22 NOTE — CARE COORDINATION
nonadmit [facility]    Active with [facility]    Rosario Cherry RN, BSN CTN  [facility] [phone]

## 2021-03-25 NOTE — PROGRESS NOTES
Physician Progress Note      Brittanie Mohr  CSN #:                  [id number]  :                       1960  ADMIT DATE:       3/5/2021 4:24 PM  [address] [place] DATE:        3/7/2021 3:50 PM  RESPONDING  PROVIDER #:        [facility]          QUERY TEXT:    Pt admitted with DKA. Per H&P: \"Acute encephalopathy-Suspect secondary to [address] and glucose 969 however must rule out other comorbidities. \"  . If possible,   please document in progress notes and discharge summary further specificity   regarding the type of encephalopathy:    The medical record reflects the following:  Risk Factors: 62 y/o female with DKA  Clinical Indicators: H&P: \"Although the patient endorses taking her home   prescription medications as recommended, she arrives today with a blood   glucose reading of 969. Patient was noted to be slightly confused and   complaining of increased fatigue and lethargy. She approximates that she has   unintentionally lost greater than 100 pounds in less than 1 year. \"   --A1c   9.3. Treatment: CT Head, Neuro checks, 2 L normal saline IVF bolus . Lab   monitoring, IV insulin in ED with hourly glucose checks until value less than   500, Lantus 10 units twice eyad  Options provided:  -- Acute Metabolic encephalopathy due to Hyperglycemia  -- Other - I will add my own diagnosis  -- Disagree - Not applicable / Not valid  -- Disagree - Clinically unable to determine / Unknown  -- Refer to Clinical Documentation Reviewer    PROVIDER RESPONSE TEXT:    This patient has Acute metabolic encephalopathy due to Hyperglycemia. Query created by: Von Mane on 3/25/2021 11:52 AM      QUERY TEXT:    Patient admitted with Hyperglycemia. Noted documentation of Acute Kidney   Injury in H&P. In order to support the diagnosis of SHARRON, please include   additional clinical indicators in your documentation.   Or please document if   the diagnosis of SHARRON has been ruled out after further study The medical record reflects the following:  Risk Factors: 62 y/o female with Hyperglycemia  Clinical Indicators: Per H&P: \"Labs are notable for pseudohyponatremia, SHARRON,   mild transaminitis, and anemia of chronic disease. \"  Creat 3/5 1.2, 3/6 Creat   0.9. Treatment: IVF's, lab monitoring    Defined by Kidney Disease Improving Global Outcomes (KDIGO) clinical practice   guideline for acute kidney injury:  -Increase in SCr by greater than or equal to 0.3 mg/dl within 48 hours; or  -Increase or decrease in SCr to greater than or equal to 1.5 times baseline,   which is known or presumed to have occurred within the prior 7 days; or  -Urine volume < 0.5ml/kg/h for 6 hours  Options provided:  -- Acute kidney injury evidenced by, Please document evidence as well as   baseline creatinine, if known.  -- [[Acute kidney injury ruled out after study  -- Other - I will add my own diagnosis  -- Disagree - Not applicable / Not valid  -- Disagree - Clinically unable to determine / Unknown  -- Refer to Clinical Documentation Reviewer    PROVIDER RESPONSE TEXT:    Acute kidney injury was ruled out after study.     Query created by: Bren Singh on 3/25/2021 12:05 PM      Electronically signed by:  Saba Rutherford CNP 3/25/2021 12:16 PM

## 2021-04-06 ENCOUNTER — VIRTUAL VISIT (OUTPATIENT)
Dept: ENDOCRINOLOGY | Age: 61
End: 2021-04-06
Payer: MEDICAID

## 2021-04-06 DIAGNOSIS — E78.5 DYSLIPIDEMIA ASSOCIATED WITH TYPE 2 DIABETES MELLITUS (HCC): ICD-10-CM

## 2021-04-06 DIAGNOSIS — E11.69 DYSLIPIDEMIA ASSOCIATED WITH TYPE 2 DIABETES MELLITUS (HCC): ICD-10-CM

## 2021-04-06 DIAGNOSIS — I10 ESSENTIAL HYPERTENSION: ICD-10-CM

## 2021-04-06 DIAGNOSIS — E11.59 TYPE 2 DIABETES MELLITUS WITH VASCULAR DISEASE (HCC): ICD-10-CM

## 2021-04-06 PROCEDURE — 99441 PR PHYS/QHP TELEPHONE EVALUATION 5-10 MIN: CPT | Performed by: INTERNAL MEDICINE

## 2021-04-06 RX ORDER — FERROUS SULFATE 325(65) MG
325 TABLET ORAL
COMMUNITY

## 2021-04-06 RX ORDER — CLONAZEPAM 0.5 MG/1
0.5 TABLET ORAL 3 TIMES DAILY PRN
COMMUNITY
Start: 2021-03-15

## 2021-04-06 RX ORDER — OXYCODONE AND ACETAMINOPHEN 7.5; 325 MG/1; MG/1
TABLET ORAL
COMMUNITY
Start: 2021-03-11

## 2021-04-06 RX ORDER — SIMVASTATIN 20 MG
TABLET ORAL
Status: ON HOLD | COMMUNITY
Start: 2021-03-22 | End: 2021-05-20 | Stop reason: HOSPADM

## 2021-04-06 RX ORDER — PALIPERIDONE 9 MG/1
9 TABLET, EXTENDED RELEASE ORAL EVERY MORNING
COMMUNITY
Start: 2021-03-15

## 2021-04-06 RX ORDER — PANTOPRAZOLE SODIUM 40 MG/1
40 TABLET, DELAYED RELEASE ORAL DAILY
COMMUNITY
Start: 2021-04-03

## 2021-04-06 NOTE — PROGRESS NOTES
Patient ID:   Francisco Peterson is a 57 y.o. female  Chief Complaint:   Francisco Peterson presents for an evaluation of Type 2 Diabetes Mellitus , Hyperlipidemia and hypertension. Pursuant to the emergency declaration under the [facility] [place] [address] [facility] authority and the [employer] Act this Telephone Visit was insisted, with patient's consent, to reduce the patient's risk of exposure to COVID-19 and provide continuity of care for an established patient. Services were provided through a synchronous discussion over a telephone to substitute for in-person clinic visit. Subjective:   Type 2 Diabetes Mellitus diagnosed around 2010  On insulin since 2012  Previous regimen:Taking Admelog 15 units tidac and 5 units for snacks. Basaglar 40 units once a day at night. VGO stopped due to hypoglycemias     Multiple hospitalizations in 2021 due to weakness, anemia   She is recently released from a nursing home     Not taking:   Synjardy 12.5-1000mg, two tabs in am. Last pick ups: Aug 2020 and Feb 2021. Victoza 1.8 mg daily in AM  - stopped as well     Admits making poor dietary choices, trying to cut down fried. Lantus 30 units daily in am.    Humalog was stopped due to poor adherence. Novolog startedd by pcp as per the patient. Novolog SSI (3 units for each above 150 )     BS Readings:    4/6  Am- 110    4/5. Numbers high as she was eating pretzels and potatoes   AM-239  LUNCH-183  [id number]    4/4  AM-183  LUNCH-239  DINNER-260      Checks blood sugars 2-3 times per day. AM:     Lunch:   Supper:    HS:        Hypoglycemias: Once in last 4 weeks      Meals: 3, dinner is big. Snacks (jellos, fruits, pretzels) after every meal because she is hungry. Exercise: None    Denies chest pain, exertional dyspnea. Family history of CAD: Both parents  Denies smoking/ alcohol.         The following portions of the patient's Date    Abnormal brain MRI 7/20/2017    Partially empty sella and minimal chronic small vessel ischemic disease    Acute bilateral low back pain without sciatica 11/2/2016    SHARRON (acute kidney injury) ([address] 7/5/2017    Arthritis     back    Bipolar disorder ([address] 10/18/2008    CAD (coronary artery disease)     stent placed 6/8/20    Cancer [facility]) 2015    bilateral breast:s/p lumpectomy/radiation:under care care of breast specialist:Dr. Boone     Carotid stenosis, bilateral:<50%:per US 7/2016 7/15/2016    Carpal tunnel syndrome 10/18/2008    Cervical cancer screening 2014    Nml per pt'.  Coronary artery disease of native artery of native heart with stable angina pectoris ([address] 6/9/2020    DDD (degenerative disc disease), lumbar 7/18/2018    Depression     under care of pschiatrist:Dr. 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     Scoliosis     Spondylosis of lumbar region without myelopathy or radiculopathy 3/10/2017    Transient cerebral ischemia 07/15/2016    TIA:7/10/16    Unspecified cerebral artery occlusion with cerebral infarction     TIA       Past Surgical History:   Procedure Laterality Date    BREAST LUMPECTOMY  2015    Bilateral:breast cancer    CARDIAC CATHETERIZATION  06/08/2020    Dr. Buzz Garcia), DAYANA to Diag 1    COLONOSCOPY N/A 2/1/2021    COLONOSCOPY DIAGNOSTIC performed by Ej Ngo MD at [address] CT BONE MARROW BIOPSY  2/3/2021    CT BONE MARROW BIOPSY 2/3/2021 Rickey Bolivar MD [facility] CT SCAN    HYSTERECTOMY      Benign:no cervical cancer per pt'    KIDNEY REMOVAL      right    OTHER SURGICAL HISTORY Right     orif right ankle    TUBAL LIGATION      UPPER GASTROINTESTINAL ENDOSCOPY N/A 1/29/2021    [facility] Ford [facility] [address] [facility] [facility] [address]   Allergen Reactions    Morphine Anaphylaxis and Hives     feels like throat is closing    Penicillins Hives and Swelling    Codeine Hives and Rash    Penicillin G Rash         Current Outpatient Medications:     paliperidone (INVEGA) 9 MG extended release tablet, Take 9 mg by mouth every morning , Disp: , Rfl:     pantoprazole (PROTONIX) 40 MG tablet, Take 40 mg by mouth daily , Disp: , Rfl:     ferrous sulfate (IRON 325) 325 (65 Fe) MG tablet, Take 325 mg by mouth daily (with breakfast), Disp: , Rfl:     simvastatin (ZOCOR) 20 MG tablet, TAKE 1 TABLET BY MOUTH EVERYDAY AT BEDTIME, Disp: , Rfl:     oxyCODONE-acetaminophen (PERCOCET) 7.5-325 MG per tablet, TAKE 1 TABLET BY MOUTH EVERY 6 (SIX) HOURS AS NEEDED FOR UP TO 28 DAYS., Disp: , Rfl:     clonazePAM (KLONOPIN) 0.5 MG tablet, Take 0.5 mg by mouth 3 times daily as needed. , Disp: , Rfl:     ticagrelor (BRILINTA) 90 MG TABS tablet, Take 1 tablet by mouth 2 times daily, Disp: 60 tablet, Rfl: 1    TRUE METRIX BLOOD GLUCOSE TEST strip, USE AS DIRECTED TO TEST 4 TIMES A DAY, Disp: 100 strip, Rfl: 5    insulin glargine (LANTUS SOLOSTAR) 100 UNIT/ML injection pen, Inject 25 Units into the skin every morning (Patient taking differently: Inject 30 Units into the skin every morning ), Disp: 1 pen, Rfl: 1    nitroGLYCERIN (NITROSTAT) 0.4 MG SL tablet, up to max of 3 total doses.  If no relief after 1 dose, call 911., Disp: 25 tablet, Rfl: 3    Insulin Pen Needle (UNIFINE PENTIPS) 32G X 4 MM MISC, USE AS DIRECTED 3 TIMES A DAY, Disp: 100 each, Rfl: 6    cetirizine (ZYRTEC) 10 MG tablet, Take 10 mg by mouth daily, Disp: , Rfl:     lisinopril (PRINIVIL;ZESTRIL) 10 MG tablet, Take 10 mg by mouth daily, Disp: , Rfl:     Blood Pressure Monitor GINA, Check BP at home once or twice a day, Disp: 1 Device, Rfl: 0    Insulin Syringe-Needle U-100 31G X 5/16\" 0.3 ML MISC, USE 3 TIMES A DAY BEFORE MEALS, Disp: 90 each, Rfl: 7    calcium carbonate-vitamin D (CALTRATE) 600-400 MG-UNIT TABS per tab, Take 1 tablet by mouth daily , Disp: , Rfl:     glucagon 1 MG injection, Inject 1 mg into the muscle See Admin Instructions Follow package directions for low blood sugar., Disp: 1 kit, Rfl: 0    aspirin 81 MG tablet, Take 81 mg by mouth daily, Disp: , Rfl:     gabapentin (NEURONTIN) 600 MG tablet, Take 600 mg by mouth 3 times daily, Disp: , Rfl:     traZODone (DESYREL) 100 MG tablet, Take 100 mg by mouth nightly, Disp: , Rfl:     atorvastatin (LIPITOR) 10 MG tablet, Take 10 mg by mouth nightly, Disp: , Rfl:       Review of Systems:    Constitutional: Negative for fever, chills, and unexpected weight change. HENT: Negative for congestion, ear pain, rhinorrhea,  sore throat and trouble swallowing. Eyes: Negative for photophobia, redness, itching. Respiratory: Negative for cough, shortness of breath and sputum. Cardiovascular: Negative for chest pain, palpitations and leg swelling. Gastrointestinal: Negative for nausea, vomiting, abdominal pain, diarrhea, constipation. Endocrine: Negative for cold intolerance, heat intolerance, polydipsia, polyphagia and polyuria. Genitourinary: Negative for dysuria, urgency, frequency, hematuria and flank pain. Musculoskeletal: Negative for myalgias, back pain, arthralgias and neck pain. Skin/Nail: Negative for rash, itching. Normal nails. Neurological: Negative for seizures, weakness, light-headedness, numbness and headaches. Hematological/ Lymph nodes: Negative for adenopathy. Does not bruise/bleed easily.    Psychiatric/Behavioral: Negative for suicidal ideas, depression, anxiety, sleep disturbance and decreased concentration. Objective:   Physical Exam:  There were no vitals taken for this visit. Lab Review:    No results displayed because visit has over 200 results.       Admission on 02/08/2021, Discharged on 02/08/2021   Component Date Value Ref Range Status    Ventricular Rate 02/08/2021 68  BPM Final    Atrial Rate 02/08/2021 68  BPM Final    P-R Interval 02/08/2021 148  ms Final    QRS Duration 02/08/2021 72  ms Final    Q-T Interval 02/08/2021 408  ms Final    QTc Calculation (Bazett) 02/08/2021 433  ms Final    P Axis 02/08/2021 53  degrees Final    R Axis 02/08/2021 0  degrees Final    T Axis 02/08/2021 38  degrees Final    Diagnosis 02/08/2021 Normal sinus rhythmNormal ECGWhen compared with ECG of 26-JAN-2021 16:29,No significant change was foundConfirmed by Geovanny Diez ([id number]) on 2/9/2021 2:43:04 PM   Final    WBC 02/08/2021 5.9  4.0 - 11.0 K/uL Final    RBC 02/08/2021 2.88* 4.00 - 5.20 M/uL Final    Hemoglobin 02/08/2021 8.7* 12.0 - 16.0 g/dL Final    Hematocrit 02/08/2021 27.2* 36.0 - 48.0 % Final    MCV 02/08/2021 94.3  80.0 - 100.0 fL Final    MCH 02/08/2021 30.2  26.0 - 34.0 pg Final    MCHC 02/08/2021 32.0  31.0 - 36.0 g/dL Final    RDW 02/08/2021 16.5* 12.4 - 15.4 % Final    Platelets 21/69/6762 245  135 - 450 K/uL Final    MPV 02/08/2021 9.1  5.0 - 10.5 fL Final    Neutrophils % 02/08/2021 75.6  % Final    Lymphocytes % 02/08/2021 16.9  % Final    Monocytes % 02/08/2021 6.6  % Final    Eosinophils % 02/08/2021 0.6  % Final    Basophils % 02/08/2021 0.3  % Final    Neutrophils Absolute 02/08/2021 4.4  1.7 - 7.7 K/uL Final    Lymphocytes Absolute 02/08/2021 1.0  1.0 - 5.1 K/uL Final    Monocytes Absolute 02/08/2021 0.4  0.0 - 1.3 K/uL Final    Eosinophils Absolute 02/08/2021 0.0  0.0 - 0.6 K/uL Final    Basophils Absolute 02/08/2021 0.0  0.0 - 0.2 K/uL Final    Sodium 02/08/2021 139  136 - 145 mmol/L Final    Potassium 02/08/2021 4.3  3.5 - 5.1 mmol/L Final    Chloride 02/08/2021 101  99 - 110 mmol/L Final    CO2 02/08/2021 28  21 - 32 mmol/L Final    Anion Gap 02/08/2021 10  3 - 16 Final    Glucose 02/08/2021 311* 70 - 99 mg/dL Final    BUN 02/08/2021 11  7 - 20 mg/dL Final    CREATININE 02/08/2021 1.1  0.6 - 1.2 mg/dL Final    GFR Non- 02/08/2021 50* >60 Final    GFR  02/08/2021 >60  >60 Final    Calcium 02/08/2021 8.6  8.3 - 10.6 mg/dL Final    Total Protein 02/08/2021 6.8  6.4 - 8.2 g/dL Final    Albumin 02/08/2021 3.4  3.4 - 5.0 g/dL Final    Albumin/Globulin Ratio 02/08/2021 1.0* 1.1 - 2.2 Final    Total Bilirubin 02/08/2021 0.3  0.0 - 1.0 mg/dL Final    Alkaline Phosphatase 02/08/2021 288* 40 - 129 U/L Final    ALT 02/08/2021 55* 10 - 40 U/L Final    AST 02/08/2021 56* 15 - 37 U/L Final    Globulin 02/08/2021 3.4  g/dL Final    Troponin 02/08/2021 <0.01  <0.01 ng/mL Final    Specimen Status 02/08/2021 KIMMY   Final    Lipase 02/08/2021 5.0* 13.0 - 60.0 U/L Final    Troponin 02/08/2021 <0.01  <0.01 ng/mL Final    Ventricular Rate 02/08/2021 56  BPM Final    Atrial Rate 02/08/2021 56  BPM Final    P-R Interval 02/08/2021 164  ms Final    QRS Duration 02/08/2021 68  ms Final    Q-T Interval 02/08/2021 458  ms Final    QTc Calculation (Bazett) 02/08/2021 441  ms Final    P Axis 02/08/2021 62  degrees Final    R Axis 02/08/2021 -13  degrees Final    T Axis 02/08/2021 35  degrees Final    Diagnosis 02/08/2021 Sinus bradycardiaOtherwise normal ECGWhen compared with ECG of 08-FEB-2021 15:11,No significant change was foundConfirmed by Malu Lacy ([id number]) on 2/9/2021 2:43:10 PM   Final   No results displayed because visit has over 200 results.       Admission on 01/07/2021, Discharged on 01/07/2021   Component Date Value Ref Range Status    POC Glucose 01/07/2021 >600* 70 - 99 mg/dl Final    Performed on 01/07/2021 ACCU-CHEK   Final    WBC 01/07/2021 5.5  4.0 - 11.0 K/uL Final    RBC 01/07/2021 3.51* 4.00 - 5.20 M/uL Final    Hemoglobin 01/07/2021 9.8* 12.0 - 16.0 g/dL Final    Hematocrit 01/07/2021 32.9* 36.0 - 48.0 % Final    MCV 01/07/2021 93.9  80.0 - 100.0 fL Final    MCH 01/07/2021 28.0  26.0 - 34.0 pg Final    MCHC 01/07/2021 29.8* 31.0 - 36.0 g/dL Final    RDW 01/07/2021 14.8  12.4 - 15.4 % Final    Platelets 99/09/1265 217  135 - 450 K/uL Final    MPV 01/07/2021 10.1  5.0 - 10.5 fL Final    Neutrophils % 01/07/2021 69.4  % Final    Lymphocytes % 01/07/2021 22.7  % Final    Monocytes % 01/07/2021 7.1  % Final    Eosinophils % 01/07/2021 0.5  % Final    Basophils % 01/07/2021 0.3  % Final    Neutrophils Absolute 01/07/2021 3.8  1.7 - 7.7 K/uL Final    Lymphocytes Absolute 01/07/2021 1.3  1.0 - 5.1 K/uL Final    Monocytes Absolute 01/07/2021 0.4  0.0 - 1.3 K/uL Final    Eosinophils Absolute 01/07/2021 0.0  0.0 - 0.6 K/uL Final    Basophils Absolute 01/07/2021 0.0  0.0 - 0.2 K/uL Final    Sodium 01/07/2021 129* 136 - 145 mmol/L Final    Potassium reflex Magnesium 01/07/2021 4.5  3.5 - 5.1 mmol/L Final    Chloride 01/07/2021 93* 99 - 110 mmol/L Final    CO2 01/07/2021 20* 21 - 32 mmol/L Final    Anion Gap 01/07/2021 16  3 - 16 Final    Glucose 01/07/2021 621* 70 - 99 mg/dL Final    BUN 01/07/2021 21* 7 - 20 mg/dL Final    CREATININE 01/07/2021 1.1  0.6 - 1.2 mg/dL Final    GFR Non- 01/07/2021 50* >60 Final    GFR  01/07/2021 >60  >60 Final    Calcium 01/07/2021 9.0  8.3 - 10.6 mg/dL Final    Total Protein 01/07/2021 8.2  6.4 - 8.2 g/dL Final    Albumin 01/07/2021 3.5  3.4 - 5.0 g/dL Final    Albumin/Globulin Ratio 01/07/2021 0.7* 1.1 - 2.2 Final    Total Bilirubin 01/07/2021 0.3  0.0 - 1.0 mg/dL Final    Alkaline Phosphatase 01/07/2021 316* 40 - 129 U/L Final    ALT 01/07/2021 35  10 - 40 U/L Final    AST 01/07/2021 26  15 - 37 U/L Final    Globulin 01/07/2021 4.7  g/dL Final  Basophils % 11/17/2020 0.7  % Final    Neutrophils Absolute 11/17/2020 4.2  1.7 - 7.7 K/uL Final    Lymphocytes Absolute 11/17/2020 1.3  1.0 - 5.1 K/uL Final    Monocytes Absolute 11/17/2020 0.3  0.0 - 1.3 K/uL Final    Eosinophils Absolute 11/17/2020 0.0  0.0 - 0.6 K/uL Final    Basophils Absolute 11/17/2020 0.0  0.0 - 0.2 K/uL Final    Sodium 11/17/2020 129* 136 - 145 mmol/L Final    Potassium 11/17/2020 4.5  3.5 - 5.1 mmol/L Final    Chloride 11/17/2020 95* 99 - 110 mmol/L Final    CO2 11/17/2020 23  21 - 32 mmol/L Final    Anion Gap 11/17/2020 11  3 - 16 Final    Glucose 11/17/2020 700* 70 - 99 mg/dL Final    BUN 11/17/2020 11  7 - 20 mg/dL Final    CREATININE 11/17/2020 1.4* 0.6 - 1.2 mg/dL Final    GFR Non- 11/17/2020 38* >60 Final    GFR  11/17/2020 46* >60 Final    Calcium 11/17/2020 9.0  8.3 - 10.6 mg/dL Final    Total Protein 11/17/2020 7.3  6.4 - 8.2 g/dL Final    Albumin 11/17/2020 3.9  3.4 - 5.0 g/dL Final    Albumin/Globulin Ratio 11/17/2020 1.1  1.1 - 2.2 Final    Total Bilirubin 11/17/2020 <0.2  0.0 - 1.0 mg/dL Final    Alkaline Phosphatase 11/17/2020 314* 40 - 129 U/L Final    ALT 11/17/2020 42* 10 - 40 U/L Final    AST 11/17/2020 54* 15 - 37 U/L Final    Globulin 11/17/2020 3.4  g/dL Final    pH, Kyle 11/17/2020 7.314* 7.350 - 7.450 Final    pCO2, Kyle 11/17/2020 44.5  40.0 - 50.0 mmHg Final    pO2, Kyle 11/17/2020 47.4* 25 - 40 mmHg Final    HCO3, Venous 11/17/2020 22.1* 23.0 - 29.0 mmol/L Final    Base Excess, [facility] 11/17/2020 -3.9* -3.0 - 3.0 mmol/L Final    O2 Sat, [facility] 11/17/2020 82  Not Established % Final    Carboxyhemoglobin 11/17/2020 4.1* 0.0 - 1.5 % Final    MetHgb, [facility] 11/17/2020 0.2  <1.5 % Final    TC02 (Calc), [facility] 11/17/2020 24  Not Established mmol/L Final    O2 Content, [facility] 11/17/2020 11  Not Established VOL % Final    O2 Therapy 11/17/2020 Unknown   Final    Color, UA 11/17/2020 Yellow  Straw/Yellow Final    Clarity, UA 11/17/2020 Clear  Clear Final    Glucose, Ur 11/17/2020 >=1000* Negative mg/dL Final    Bilirubin Urine 11/17/2020 Negative  Negative Final    Ketones, Urine 11/17/2020 Negative  Negative mg/dL Final    Specific [place], UA 11/17/2020 1.010  1.005 - 1.030 Final    Blood, Urine 11/17/2020 Negative  Negative Final    pH, UA 11/17/2020 5.5  5.0 - 8.0 Final    Protein, UA 11/17/2020 Negative  Negative mg/dL Final    Urobilinogen, Urine 11/17/2020 0.2  <2.0 E.U./dL Final    Nitrite, Urine 11/17/2020 Negative  Negative Final    Leukocyte Esterase, Urine 11/17/2020 Negative  Negative Final    Microscopic Examination 11/17/2020 Not Indicated   Final    Urine Type 11/17/2020 NotGiven   Final    Glucose 11/17/2020 342  mg/dL Final    POC Glucose 11/17/2020 440* 70 - 99 mg/dl Final    Performed on 11/17/2020 ACCU-CHEK   Final    POC Glucose 11/17/2020 342* 70 - 99 mg/dl Final    Performed on 11/17/2020 ACCU-CHEK   Final   Admission on 11/11/2020, Discharged on 11/12/2020   Component Date Value Ref Range Status    Rapid Influenza A Ag 11/11/2020 Negative  Negative Final    Rapid Influenza B Ag 11/11/2020 Negative  Negative Final    Rapid Strep A Screen 11/11/2020 Negative  Negative Final    SARS-CoV-2, NAAT 11/11/2020 Not Detected  Not Detected Final    Strep A Culture 11/11/2020 Normal oral katherine, No Beta Strep isolated   Final    Glucose 11/12/2020 162  mg/dL Final    POC Glucose 11/11/2020 434* 70 - 99 mg/dl Final    Performed on 11/11/2020 ACCU-CHEK   Final    WBC 11/11/2020 7.6  4.0 - 11.0 K/uL Final    RBC 11/11/2020 3.65* 4.00 - 5.20 M/uL Final    Hemoglobin 11/11/2020 10.4* 12.0 - 16.0 g/dL Final    Hematocrit 11/11/2020 33.8* 36.0 - 48.0 % Final    MCV 11/11/2020 92.4  80.0 - 100.0 fL Final    MCH 11/11/2020 28.5  26.0 - 34.0 pg Final    MCHC 11/11/2020 30.9* 31.0 - 36.0 g/dL Final    RDW 11/11/2020 13.7  12.4 - 15.4 % Final    Platelets 35/46/2104 220  135 - 450 K/uL Final  MPV 11/11/2020 10.0  5.0 - 10.5 fL Final    Neutrophils % 11/11/2020 68.6  % Final    Lymphocytes % 11/11/2020 22.6  % Final    Monocytes % 11/11/2020 8.0  % Final    Eosinophils % 11/11/2020 0.4  % Final    Basophils % 11/11/2020 0.4  % Final    Neutrophils Absolute 11/11/2020 5.2  1.7 - 7.7 K/uL Final    Lymphocytes Absolute 11/11/2020 1.7  1.0 - 5.1 K/uL Final    Monocytes Absolute 11/11/2020 0.6  0.0 - 1.3 K/uL Final    Eosinophils Absolute 11/11/2020 0.0  0.0 - 0.6 K/uL Final    Basophils Absolute 11/11/2020 0.0  0.0 - 0.2 K/uL Final    Sodium 11/11/2020 134* 136 - 145 mmol/L Final    Potassium reflex Magnesium 11/11/2020 3.9  3.5 - 5.1 mmol/L Final    Chloride 11/11/2020 97* 99 - 110 mmol/L Final    CO2 11/11/2020 22  21 - 32 mmol/L Final    Anion Gap 11/11/2020 15  3 - 16 Final    Glucose 11/11/2020 377* 70 - 99 mg/dL Final    BUN 11/11/2020 16  7 - 20 mg/dL Final    CREATININE 11/11/2020 1.2  0.6 - 1.2 mg/dL Final    GFR Non- 11/11/2020 46* >60 Final    GFR  11/11/2020 55* >60 Final    Calcium 11/11/2020 9.8  8.3 - 10.6 mg/dL Final    Total Protein 11/11/2020 7.9  6.4 - 8.2 g/dL Final    Albumin 11/11/2020 3.3* 3.4 - 5.0 g/dL Final    Albumin/Globulin Ratio 11/11/2020 0.7* 1.1 - 2.2 Final    Total Bilirubin 11/11/2020 0.4  0.0 - 1.0 mg/dL Final    Alkaline Phosphatase 11/11/2020 201* 40 - 129 U/L Final    ALT 11/11/2020 28  10 - 40 U/L Final    AST 11/11/2020 18  15 - 37 U/L Final    Globulin 11/11/2020 4.6  g/dL Final    Color, UA 11/11/2020 Straw  Straw/Yellow Final    Clarity, UA 11/11/2020 SL CLOUDY* Clear Final    Glucose, Ur 11/11/2020 >=1000* Negative mg/dL Final    Bilirubin Urine 11/11/2020 Negative  Negative Final    Ketones, Urine 11/11/2020 TRACE* Negative mg/dL Final    Specific [place], UA 11/11/2020 <=1.005  1.005 - 1.030 Final    Blood, Urine 11/11/2020 Negative  Negative Final    pH, UA 11/11/2020 5.5  5.0 - 8.0 Final -9.0* -3.0 - 3.0 mmol/L Final    O2 Sat, Kyle 10/07/2020 98  Not Established % Final    Carboxyhemoglobin 10/07/2020 2.2* 0.0 - 1.5 % Final    MetHgb, Kyle 10/07/2020 0.1  <1.5 % Final    TC02 (Calc), Kyle 10/07/2020 17  Not Established mmol/L Final    O2 Content, Kyle 10/07/2020 15  Not Established VOL % Final    O2 Therapy 10/07/2020 Unknown   Final    Phosphorus 10/07/2020 3.6  2.5 - 4.9 mg/dL Final    Lactic Acid 10/07/2020 1.8  0.4 - 2.0 mmol/L Final    Ventricular Rate 10/07/2020 62  BPM Final    Atrial Rate 10/07/2020 62  BPM Final    P-R Interval 10/07/2020 144  ms Final    QRS Duration 10/07/2020 82  ms Final    Q-T Interval 10/07/2020 408  ms Final    QTc Calculation (Bazett) 10/07/2020 414  ms Final    P Axis 10/07/2020 55  degrees Final    R Axis 10/07/2020 -32  degrees Final    T Axis 10/07/2020 44  degrees Final    Diagnosis 10/07/2020 Normal sinus rhythm with sinus arrhythmiaLeft axis deviationAbnormal ECGWhen compared with ECG of 11-JUL-2020 07:49,No significant change was foundConfirmed by Zo Hamilton MD, [place] ([id number]) on 10/8/2020 4:38:16 PM   Final    POC Glucose 10/07/2020 576* 70 - 99 mg/dl Final    Performed on 10/07/2020 ACCU-CHEK   Final    Color, UA 10/07/2020 Yellow  Straw/Yellow Final    Clarity, UA 10/07/2020 Clear  Clear Final    Glucose, Ur 10/07/2020 >=1000* Negative mg/dL Final    Bilirubin Urine 10/07/2020 Negative  Negative Final    Ketones, Urine 10/07/2020 TRACE* Negative mg/dL Final    Specific [place], UA 10/07/2020 1.010  1.005 - 1.030 Final    Blood, Urine 10/07/2020 SMALL* Negative Final    pH, UA 10/07/2020 5.5  5.0 - 8.0 Final    Protein, UA 10/07/2020 Negative  Negative mg/dL Final    Urobilinogen, Urine 10/07/2020 0.2  <2.0 E.U./dL Final    Nitrite, Urine 10/07/2020 Negative  Negative Final    Leukocyte Esterase, Urine 10/07/2020 Negative  Negative Final    Microscopic Examination 10/07/2020 YES   Final    Urine Type 10/07/2020 NotGiven Final    Urine Reflex to Culture 10/07/2020 Not Indicated   Final    WBC, UA 10/07/2020 0-2  0 - 5 /HPF Final    RBC, UA 10/07/2020 5-10* 0 - 4 /HPF Final    Epithelial Cells, UA 10/07/2020 0-1  0 - 5 /HPF Final    Bacteria, UA 10/07/2020 Rare* None Seen /HPF Final    Amorphous, UA 10/07/2020 Rare  /HPF Final    Yeast, UA 10/07/2020 Present* None Seen /HPF Final    Magnesium 10/07/2020 1.90  1.80 - 2.40 mg/dL Final    Troponin 10/07/2020 <0.01  <0.01 ng/mL Final    POC Glucose 10/07/2020 544* 70 - 99 mg/dl Final    Performed on 10/07/2020 ACCU-CHEK   Final    Sodium 10/07/2020 137  136 - 145 mmol/L Final    Potassium 10/07/2020 3.0* 3.5 - 5.1 mmol/L Final    Chloride 10/07/2020 104  99 - 110 mmol/L Final    CO2 10/07/2020 21  21 - 32 mmol/L Final    Anion Gap 10/07/2020 12  3 - 16 Final    Glucose 10/07/2020 127* 70 - 99 mg/dL Final    BUN 10/07/2020 28* 7 - 20 mg/dL Final    CREATININE 10/07/2020 1.4* 0.6 - 1.2 mg/dL Final    GFR Non- 10/07/2020 38* >60 Final    GFR  10/07/2020 46* >60 Final    Calcium 10/07/2020 9.0  8.3 - 10.6 mg/dL Final    Sodium 10/08/2020 140  136 - 145 mmol/L Final    Potassium 10/08/2020 3.3* 3.5 - 5.1 mmol/L Final    Chloride 10/08/2020 109  99 - 110 mmol/L Final    CO2 10/08/2020 20* 21 - 32 mmol/L Final    Anion Gap 10/08/2020 11  3 - 16 Final    Glucose 10/08/2020 186* 70 - 99 mg/dL Final    BUN 10/08/2020 28* 7 - 20 mg/dL Final    CREATININE 10/08/2020 1.2  0.6 - 1.2 mg/dL Final    GFR Non- 10/08/2020 46* >60 Final    GFR  10/08/2020 55* >60 Final    Calcium 10/08/2020 8.7  8.3 - 10.6 mg/dL Final    Sodium 10/08/2020 137  136 - 145 mmol/L Final    Potassium 10/08/2020 4.1  3.5 - 5.1 mmol/L Final    Chloride 10/08/2020 107  99 - 110 mmol/L Final    CO2 10/08/2020 21  21 - 32 mmol/L Final    Anion Gap 10/08/2020 9  3 - 16 Final    Glucose 10/08/2020 213* 70 - 99 mg/dL Final    BUN 10/08/2020 25* 7 - 20 mg/dL Final    CREATININE 10/08/2020 1.2  0.6 - 1.2 mg/dL Final    GFR Non- 10/08/2020 46* >60 Final    GFR  10/08/2020 55* >60 Final    Calcium 10/08/2020 8.6  8.3 - 10.6 mg/dL Final    Magnesium 10/07/2020 1.90  1.80 - 2.40 mg/dL Final    Magnesium 10/08/2020 1.90  1.80 - 2.40 mg/dL Final    Magnesium 10/08/2020 1.90  1.80 - 2.40 mg/dL Final    Phosphorus 10/07/2020 2.6  2.5 - 4.9 mg/dL Final    Phosphorus 10/08/2020 3.1  2.5 - 4.9 mg/dL Final    Phosphorus 10/08/2020 3.2  2.5 - 4.9 mg/dL Final    POC Glucose 10/07/2020 419* 70 - 99 mg/dl Final    Performed on 10/07/2020 ACCU-CHEK   Final    POC Glucose 10/07/2020 305* 70 - 99 mg/dl Final    Performed on 10/07/2020 ACCU-CHEK   Final    POC Glucose 10/07/2020 195* 70 - 99 mg/dl Final    Performed on 10/07/2020 ACCU-CHEK   Final    POC Glucose 10/07/2020 126* 70 - 99 mg/dl Final    Performed on 10/07/2020 ACCU-CHEK   Final    POC Glucose 10/08/2020 136* 70 - 99 mg/dl Final    Performed on 10/08/2020 ACCU-CHEK   Final    POC Glucose 10/08/2020 165* 70 - 99 mg/dl Final    Performed on 10/08/2020 ACCU-CHEK   Final    POC Glucose 10/08/2020 180* 70 - 99 mg/dl Final    Performed on 10/08/2020 ACCU-CHEK   Final    POC Glucose 10/08/2020 191* 70 - 99 mg/dl Final    Performed on 10/08/2020 ACCU-CHEK   Final    POC Glucose 10/08/2020 186* 70 - 99 mg/dl Final    Performed on 10/08/2020 ACCU-CHEK   Final    POC Glucose 10/08/2020 209* 70 - 99 mg/dl Final    Performed on 10/08/2020 ACCU-CHEK   Final    Hemoglobin A1C 10/08/2020 11.9  See comment % Final    eAG 10/08/2020 294.8  mg/dL Final    POC Glucose 10/08/2020 172* 70 - 99 mg/dl Final    Performed on 10/08/2020 ACCU-CHEK   Final    POC Glucose 10/08/2020 138* 70 - 99 mg/dl Final    Performed on 10/08/2020 ACCU-CHEK   Final   Office Visit on 08/06/2020   Component Date Value Ref Range Status    SARS-CoV-2, ANTONI 08/06/2020 NOT DETECTED NOT DETECTED Final   Admission on 08/04/2020, Discharged on 08/05/2020   Component Date Value Ref Range Status    POC Glucose 08/04/2020 587* 70 - 99 mg/dl Final    Performed on 08/04/2020 ACCU-CHEK   Final    WBC 08/04/2020 6.3  4.0 - 11.0 K/uL Final    RBC 08/04/2020 3.78* 4.00 - 5.20 M/uL Final    Hemoglobin 08/04/2020 10.4* 12.0 - 16.0 g/dL Final    Hematocrit 08/04/2020 34.0* 36.0 - 48.0 % Final    MCV 08/04/2020 89.9  80.0 - 100.0 fL Final    MCH 08/04/2020 27.4  26.0 - 34.0 pg Final    MCHC 08/04/2020 30.5* 31.0 - 36.0 g/dL Final    RDW 08/04/2020 15.0  12.4 - 15.4 % Final    Platelets 68/46/8002 183  135 - 450 K/uL Final    MPV 08/04/2020 9.6  5.0 - 10.5 fL Final    Neutrophils % 08/04/2020 59.9  % Final    Lymphocytes % 08/04/2020 32.4  % Final    Monocytes % 08/04/2020 6.7  % Final    Eosinophils % 08/04/2020 0.8  % Final    Basophils % 08/04/2020 0.2  % Final    Neutrophils Absolute 08/04/2020 3.8  1.7 - 7.7 K/uL Final    Lymphocytes Absolute 08/04/2020 2.0  1.0 - 5.1 K/uL Final    Monocytes Absolute 08/04/2020 0.4  0.0 - 1.3 K/uL Final    Eosinophils Absolute 08/04/2020 0.0  0.0 - 0.6 K/uL Final    Basophils Absolute 08/04/2020 0.0  0.0 - 0.2 K/uL Final    Sodium 08/04/2020 131* 136 - 145 mmol/L Final    Potassium reflex Magnesium 08/04/2020 4.4  3.5 - 5.1 mmol/L Final    Chloride 08/04/2020 96* 99 - 110 mmol/L Final    CO2 08/04/2020 20* 21 - 32 mmol/L Final    Anion Gap 08/04/2020 15  3 - 16 Final    Glucose 08/04/2020 662* 70 - 99 mg/dL Final    BUN 08/04/2020 17  7 - 20 mg/dL Final    CREATININE 08/04/2020 1.2  0.6 - 1.2 mg/dL Final    GFR Non- 08/04/2020 46* >60 Final    GFR  08/04/2020 55* >60 Final    Calcium 08/04/2020 9.2  8.3 - 10.6 mg/dL Final    Total Protein 08/04/2020 7.5  6.4 - 8.2 g/dL Final    Albumin 08/04/2020 4.0  3.4 - 5.0 g/dL Final    Albumin/Globulin Ratio 08/04/2020 1.1  1.1 - 2.2 Final    Total Bilirubin weigh tloss - good appetite   TSH, FT4, am cortisol normal - Nov 2020    Following up with pcp     RTC in 6 weeks with logs     A total of 8 minutes was spent conversing with the patient and over half of that time was spent counseling the patient on endocrine related medical issues. EDUCATION:   Greater than 50% of this follow-up visit was spent in general counseling regarding   obesity, diet, exercise, importance of adherence to insulin regime, recognition and treatment of hypo and hyperglycemia,  glucose logging, proper diabetes management, diabetic complications with poor management and the importance of glycemic control in order to avoid the complications of diabetes. Risks and potential complications of diabetes were reviewed with the patient. Diabetes health maintenance plan and follow-up were discussed and understood by the patient. We reviewed the importance of medication compliance and regular follow-up. Aggressive lifestyle modification was encouraged. Exercise Counselling: This patient is a candidate for regular physical exercise. Instructions to perform the following types of exercise:  Swimming or water aerobic exercise  Brisk walking  Playing tennis  Stationary bicycle or elliptical indoor  Low impact aerobic exercise    Instructions given to exercise for the following duration:  30 minutes a day for five-seven days per week.     Following instructions for being active throughout the day in addition to formal exercise:  Walk instead of drive whenever possible  Take the stairs instead of the elevator  Work in the garden  Park to the far end of the parking lot to add more walking steps to destination      Electronically signed by Reinaldo Stephenson MD on 4/6/2021 at 10:31 AM

## 2021-04-06 NOTE — DISCHARGE SUMMARY
Hospital Medicine Discharge Summary    Patient ID: Selam Hudson      Patient's PCP: Juan Luis Dawn Date: 3/5/2021     Discharge Date: 3/7/2021      Admitting Physician: Arielle Jalloh MD     Discharge Physician: SARTHAK Cortez - CNP     Discharge Diagnoses: Active Hospital Problems    Diagnosis    Acute encephalopathy [G93.40]    Anemia [D64.9]    DM (diabetes mellitus), type 2, uncontrolled with complications (HCC) [V89.8, E11.65]    CAD (coronary artery disease) [I25.10]    History of CVA (cerebrovascular accident) without residual deficits [Z86.73]    Dyslipidemia associated with type 2 diabetes mellitus ([address] [E11.69, E78.5]    CKD (chronic kidney disease), stage III [N18.30]    Lactic acidosis [E87.2]    History of bilateral breast cancer [Z85.3]    Essential hypertension [I10]    Acute hyperglycemia [R73.9]       The patient was seen and examined on day of discharge and this discharge summary is in conjunction with any daily progress note from day of discharge. Hospital Course:     Coleman Lobe a 63 y.o. female who presented to the ED to be evaluated for home glucometer readings greater than 600 on the day of admission. Acute hyperglycemia (glucose 968) without ketosis - due to medication non-compliance. --resume home regimen. Stressed importance of compliance with caregiver at the bedside.   --A1c 9.3.     Generalized fatigue with lactic acidosis  -Blood and urine cultures NGTD  --received IVF  -PT OT consultation     Acute encephalopathy - resolved  -Suspect secondary to [address] [place] and glucose 969     CAD  -Echo obtained 6/6/2020 revealed normal systolic and diastolic function  -Continue lisinopril, Lipitor, Brilinta, and EC ASA per home dose     Anemia of chronic disease  -H&H appear stable; patient s/p transfusion January 2021  -FOBT during stay  -Continue PPI for GI prophylaxis            Physical Exam Performed:     BP (!) 167/74   Pulse 68   Temp 98.2 °F (36.8 °C) (Oral)   Resp 18   Ht 5' 3\" (1.6 m)   Wt 111 lb 3.2 oz (50.4 kg)   SpO2 100%   BMI 19.70 kg/m²       General appearance:  No apparent distress, appears stated age and cooperative. HEENT:  Normal cephalic, atraumatic without obvious deformity. Pupils equal, round, and reactive to light. Extra ocular muscles intact. Conjunctivae/corneas clear. Neck: Supple, with full range of motion. No jugular venous distention. Trachea midline. Respiratory:  Normal respiratory effort. Clear to auscultation, bilaterally without Rales/Wheezes/Rhonchi. Cardiovascular:  Regular rate and rhythm with normal S1/S2 without murmurs, rubs or gallops. Abdomen: Soft, non-tender, non-distended with normal bowel sounds. Musculoskeletal:  No clubbing, cyanosis or edema bilaterally. Full range of motion without deformity. Skin: Skin color, texture, turgor normal.  No rashes or lesions. Neurologic:  Neurovascularly intact without any focal sensory/motor deficits. Cranial nerves: II-XII intact, grossly non-focal.  Psychiatric:  Alert and oriented, thought content appropriate, normal insight  Capillary Refill: Brisk,< 3 seconds   Peripheral Pulses: +2 palpable, equal bilaterally       Labs: For convenience and continuity at follow-up the following most recent labs are provided:      CBC:    Lab Results   Component Value Date    WBC 4.2 03/07/2021    HGB 9.8 03/07/2021    HCT 30.0 03/07/2021     03/07/2021       Renal:    Lab Results   Component Value Date     03/07/2021    K 3.6 03/07/2021     03/07/2021    CO2 22 03/07/2021    BUN 9 03/07/2021    CREATININE 0.9 03/07/2021    CALCIUM 9.2 03/07/2021    PHOS 2.9 03/06/2021         Significant Diagnostic Studies    Radiology:   CT HEAD WO CONTRAST   Final Result   Mild atrophy which is slightly more prominent and mild chronic microischemic   changes the deep white matter which is unchanged with no acute abnormality   seen.       Chronic right ethmoid and maxillary sinusitis which is more prominent. XR CHEST PORTABLE   Final Result   No evidence of pneumonia                Consults:     IP CONSULT TO HOSPITALIST  IP CONSULT TO HOME CARE NEEDS    Disposition:  Home with home health     Condition at Discharge: Stable    Discharge Instructions/Follow-up:  F/u with PCP in 1-2 weeks. Code Status:  Full    Activity: activity as tolerated    Diet: diabetic diet      Discharge Medications:     Discharge Medication List as of 3/7/2021  3:13 PM           Details   pantoprazole (PROTONIX) 40 MG tablet Take 1 tablet by mouth every morning (before breakfast), Disp-30 tablet, R-3Normal              Details   lactobacillus (CULTURELLE) capsule Take 2 capsules by mouth 2 times daily (with meals), Disp-30 capsule, R-0NO PRINT      paliperidone (INVEGA) 6 MG extended release tablet Take 1 tablet by mouth every morning, Disp-30 tablet, R-3Print      ticagrelor (BRILINTA) 90 MG TABS tablet Take 1 tablet by mouth 2 times daily, Disp-60 tablet, R-1Print      TRUE METRIX BLOOD GLUCOSE TEST strip USE AS DIRECTED TO TEST 4 TIMES A DAY, Disp-100 strip, R-5Normal      JARDIANCE 25 MG tablet TAKE 1 TABLET BY MOUTH EVERY DAY IN THE MORNING, DAWHistorical Med      insulin glargine (LANTUS SOLOSTAR) 100 UNIT/ML injection pen Inject 25 Units into the skin every morning, Disp-1 pen,R-1Normal      Liraglutide (VICTOZA) 18 MG/3ML SOPN SC injection Inject 1.8 mg into the skin daily, Disp-3 pen,R-5Normal      nitroGLYCERIN (NITROSTAT) 0.4 MG SL tablet up to max of 3 total doses.  If no relief after 1 dose, call 911., Disp-25 tablet,R-3Normal      Insulin Pen Needle (UNIFINE PENTIPS) 32G X 4 MM MISC Disp-100 each, R-6, NormalUSE AS DIRECTED 3 TIMES A DAY      cetirizine (ZYRTEC) 10 MG tablet Take 10 mg by mouth dailyHistorical Med      lisinopril (PRINIVIL;ZESTRIL) 10 MG tablet Take 10 mg by mouth dailyHistorical Med      simvastatin (ZOCOR) 20 MG tablet Take 20 mg by mouth nightlyHistorical

## 2021-04-11 DIAGNOSIS — I10 ESSENTIAL (PRIMARY) HYPERTENSION: ICD-10-CM

## 2021-04-11 DIAGNOSIS — E11.29 TYPE 2 DIABETES MELLITUS WITH OTHER DIABETIC KIDNEY COMPLICATION (HCC): ICD-10-CM

## 2021-04-12 RX ORDER — LISINOPRIL 10 MG/1
TABLET ORAL
Qty: 90 TABLET | Refills: 3 | Status: ON HOLD | OUTPATIENT
Start: 2021-04-12 | End: 2021-05-20 | Stop reason: HOSPADM

## 2021-05-18 ENCOUNTER — APPOINTMENT (OUTPATIENT)
Dept: GENERAL RADIOLOGY | Age: 61
DRG: 199 | End: 2021-05-18
Payer: MEDICAID

## 2021-05-18 ENCOUNTER — HOSPITAL ENCOUNTER (INPATIENT)
Age: 61
LOS: 1 days | Discharge: HOME | DRG: 199 | End: 2021-05-20
Attending: EMERGENCY MEDICINE | Admitting: INTERNAL MEDICINE
Payer: MEDICAID

## 2021-05-18 DIAGNOSIS — E11.29 TYPE 2 DIABETES MELLITUS WITH OTHER DIABETIC KIDNEY COMPLICATION (HCC): ICD-10-CM

## 2021-05-18 DIAGNOSIS — R07.9 ACUTE CHEST PAIN: Primary | ICD-10-CM

## 2021-05-18 DIAGNOSIS — Z86.79 HISTORY OF HIGH BLOOD PRESSURE: ICD-10-CM

## 2021-05-18 DIAGNOSIS — E11.65 TYPE 2 DIABETES MELLITUS WITH HYPERGLYCEMIA, WITH LONG-TERM CURRENT USE OF INSULIN (HCC): ICD-10-CM

## 2021-05-18 DIAGNOSIS — I10 ESSENTIAL (PRIMARY) HYPERTENSION: ICD-10-CM

## 2021-05-18 DIAGNOSIS — Z79.4 TYPE 2 DIABETES MELLITUS WITH HYPERGLYCEMIA, WITH LONG-TERM CURRENT USE OF INSULIN (HCC): ICD-10-CM

## 2021-05-18 LAB
A/G RATIO: 0.9 (ref 1.1–2.2)
ALBUMIN SERPL-MCNC: 3.7 G/DL (ref 3.4–5)
ALP BLD-CCNC: 198 U/L (ref 40–129)
ALT SERPL-CCNC: 34 U/L (ref 10–40)
ANION GAP SERPL CALCULATED.3IONS-SCNC: 12 MMOL/L (ref 3–16)
AST SERPL-CCNC: 26 U/L (ref 15–37)
BASE EXCESS VENOUS: 1.5 MMOL/L (ref -3–3)
BASOPHILS ABSOLUTE: 0 K/UL (ref 0–0.2)
BASOPHILS RELATIVE PERCENT: 0.3 %
BILIRUB SERPL-MCNC: <0.2 MG/DL (ref 0–1)
BILIRUBIN URINE: NEGATIVE
BLOOD, URINE: NEGATIVE
BUN BLDV-MCNC: 13 MG/DL (ref 7–20)
CALCIUM SERPL-MCNC: 9.3 MG/DL (ref 8.3–10.6)
CARBOXYHEMOGLOBIN: 5.2 % (ref 0–1.5)
CHLORIDE BLD-SCNC: 93 MMOL/L (ref 99–110)
CHP ED QC CHECK: YES
CLARITY: CLEAR
CO2: 24 MMOL/L (ref 21–32)
COLOR: YELLOW
CREAT SERPL-MCNC: 1.6 MG/DL (ref 0.6–1.2)
EOSINOPHILS ABSOLUTE: 0 K/UL (ref 0–0.6)
EOSINOPHILS RELATIVE PERCENT: 0.9 %
GFR AFRICAN AMERICAN: 39
GFR NON-AFRICAN AMERICAN: 33
GLOBULIN: 3.9 G/DL
GLUCOSE BLD-MCNC: 839 MG/DL (ref 70–99)
GLUCOSE BLD-MCNC: >600 MG/DL (ref 70–99)
GLUCOSE BLD-MCNC: >600 MG/DL (ref 70–99)
GLUCOSE BLD-MCNC: NORMAL MG/DL
GLUCOSE URINE: >=1000 MG/DL
HCO3 VENOUS: 24.3 MMOL/L (ref 23–29)
HCT VFR BLD CALC: 34.8 % (ref 36–48)
HEMOGLOBIN: 10.8 G/DL (ref 12–16)
KETONES, URINE: NEGATIVE MG/DL
LEUKOCYTE ESTERASE, URINE: NEGATIVE
LYMPHOCYTES ABSOLUTE: 1.6 K/UL (ref 1–5.1)
LYMPHOCYTES RELATIVE PERCENT: 34.8 %
MCH RBC QN AUTO: 28.1 PG (ref 26–34)
MCHC RBC AUTO-ENTMCNC: 31.1 G/DL (ref 31–36)
MCV RBC AUTO: 90.6 FL (ref 80–100)
METHEMOGLOBIN VENOUS: 0.4 %
MICROSCOPIC EXAMINATION: ABNORMAL
MONOCYTES ABSOLUTE: 0.3 K/UL (ref 0–1.3)
MONOCYTES RELATIVE PERCENT: 5.6 %
NEUTROPHILS ABSOLUTE: 2.7 K/UL (ref 1.7–7.7)
NEUTROPHILS RELATIVE PERCENT: 58.4 %
NITRITE, URINE: NEGATIVE
O2 CONTENT, VEN: 14 VOL %
O2 SAT, VEN: 99 %
O2 THERAPY: ABNORMAL
PCO2, VEN: 31.9 MMHG (ref 40–50)
PDW BLD-RTO: 13.5 % (ref 12.4–15.4)
PERFORMED ON: ABNORMAL
PERFORMED ON: ABNORMAL
PH UA: 6 (ref 5–8)
PH VENOUS: 7.5 (ref 7.35–7.45)
PLATELET # BLD: 160 K/UL (ref 135–450)
PMV BLD AUTO: 10.1 FL (ref 5–10.5)
PO2, VEN: 132.9 MMHG (ref 25–40)
POTASSIUM SERPL-SCNC: 4.8 MMOL/L (ref 3.5–5.1)
PROTEIN UA: NEGATIVE MG/DL
RBC # BLD: 3.84 M/UL (ref 4–5.2)
SODIUM BLD-SCNC: 129 MMOL/L (ref 136–145)
SPECIFIC GRAVITY UA: 1.01 (ref 1–1.03)
TCO2 CALC VENOUS: 25 MMOL/L
TOTAL PROTEIN: 7.6 G/DL (ref 6.4–8.2)
TROPONIN: <0.01 NG/ML
URINE TYPE: ABNORMAL
UROBILINOGEN, URINE: 0.2 E.U./DL
WBC # BLD: 4.6 K/UL (ref 4–11)

## 2021-05-18 PROCEDURE — 2580000003 HC RX 258: Performed by: INTERNAL MEDICINE

## 2021-05-18 PROCEDURE — 96374 THER/PROPH/DIAG INJ IV PUSH: CPT

## 2021-05-18 PROCEDURE — 82803 BLOOD GASES ANY COMBINATION: CPT

## 2021-05-18 PROCEDURE — 6360000002 HC RX W HCPCS: Performed by: NURSE PRACTITIONER

## 2021-05-18 PROCEDURE — 84484 ASSAY OF TROPONIN QUANT: CPT

## 2021-05-18 PROCEDURE — 99285 EMERGENCY DEPT VISIT HI MDM: CPT

## 2021-05-18 PROCEDURE — 2580000003 HC RX 258: Performed by: NURSE PRACTITIONER

## 2021-05-18 PROCEDURE — 6370000000 HC RX 637 (ALT 250 FOR IP): Performed by: INTERNAL MEDICINE

## 2021-05-18 PROCEDURE — 80053 COMPREHEN METABOLIC PANEL: CPT

## 2021-05-18 PROCEDURE — 81003 URINALYSIS AUTO W/O SCOPE: CPT

## 2021-05-18 PROCEDURE — 71045 X-RAY EXAM CHEST 1 VIEW: CPT

## 2021-05-18 PROCEDURE — 96361 HYDRATE IV INFUSION ADD-ON: CPT

## 2021-05-18 PROCEDURE — 96375 TX/PRO/DX INJ NEW DRUG ADDON: CPT

## 2021-05-18 PROCEDURE — 82010 KETONE BODYS QUAN: CPT

## 2021-05-18 PROCEDURE — 93005 ELECTROCARDIOGRAM TRACING: CPT | Performed by: EMERGENCY MEDICINE

## 2021-05-18 PROCEDURE — 6370000000 HC RX 637 (ALT 250 FOR IP): Performed by: NURSE PRACTITIONER

## 2021-05-18 PROCEDURE — 85025 COMPLETE CBC W/AUTO DIFF WBC: CPT

## 2021-05-18 RX ORDER — 0.9 % SODIUM CHLORIDE 0.9 %
1000 INTRAVENOUS SOLUTION INTRAVENOUS ONCE
Status: COMPLETED | OUTPATIENT
Start: 2021-05-18 | End: 2021-05-19

## 2021-05-18 RX ORDER — ONDANSETRON 2 MG/ML
4 INJECTION INTRAMUSCULAR; INTRAVENOUS ONCE
Status: COMPLETED | OUTPATIENT
Start: 2021-05-18 | End: 2021-05-18

## 2021-05-18 RX ORDER — SODIUM CHLORIDE, SODIUM LACTATE, POTASSIUM CHLORIDE, CALCIUM CHLORIDE 600; 310; 30; 20 MG/100ML; MG/100ML; MG/100ML; MG/100ML
1000 INJECTION, SOLUTION INTRAVENOUS ONCE
Status: COMPLETED | OUTPATIENT
Start: 2021-05-18 | End: 2021-05-18

## 2021-05-18 RX ORDER — NITROGLYCERIN 0.4 MG/1
0.4 TABLET SUBLINGUAL EVERY 5 MIN PRN
Status: DISCONTINUED | OUTPATIENT
Start: 2021-05-18 | End: 2021-05-20 | Stop reason: HOSPADM

## 2021-05-18 RX ADMIN — ONDANSETRON 4 MG: 2 INJECTION INTRAMUSCULAR; INTRAVENOUS at 21:50

## 2021-05-18 RX ADMIN — NITROGLYCERIN 0.4 MG: 0.4 TABLET, ORALLY DISINTEGRATING SUBLINGUAL at 21:56

## 2021-05-18 RX ADMIN — INSULIN HUMAN 10 UNITS: 100 INJECTION, SOLUTION PARENTERAL at 23:25

## 2021-05-18 RX ADMIN — SODIUM CHLORIDE 1000 ML: 9 INJECTION, SOLUTION INTRAVENOUS at 23:15

## 2021-05-18 RX ADMIN — NITROGLYCERIN 0.4 MG: 0.4 TABLET, ORALLY DISINTEGRATING SUBLINGUAL at 22:04

## 2021-05-18 RX ADMIN — SODIUM CHLORIDE, POTASSIUM CHLORIDE, SODIUM LACTATE AND CALCIUM CHLORIDE 1000 ML: 600; 310; 30; 20 INJECTION, SOLUTION INTRAVENOUS at 21:49

## 2021-05-18 RX ADMIN — INSULIN HUMAN 10 UNITS: 100 INJECTION, SOLUTION PARENTERAL at 21:59

## 2021-05-18 RX ADMIN — NITROGLYCERIN 0.5 INCH: 20 OINTMENT TOPICAL at 23:15

## 2021-05-18 ASSESSMENT — ENCOUNTER SYMPTOMS
WHEEZING: 0
DIARRHEA: 0
VOMITING: 0
ABDOMINAL PAIN: 0
COUGH: 0
COLOR CHANGE: 0
SHORTNESS OF BREATH: 0
BACK PAIN: 0
NAUSEA: 0

## 2021-05-18 ASSESSMENT — PAIN DESCRIPTION - ONSET: ONSET: ON-GOING

## 2021-05-18 ASSESSMENT — PAIN DESCRIPTION - ORIENTATION: ORIENTATION: MID

## 2021-05-18 ASSESSMENT — PAIN DESCRIPTION - LOCATION: LOCATION: CHEST

## 2021-05-18 ASSESSMENT — PAIN DESCRIPTION - PAIN TYPE: TYPE: ACUTE PAIN

## 2021-05-18 ASSESSMENT — PAIN SCALES - GENERAL: PAINLEVEL_OUTOF10: 5

## 2021-05-18 ASSESSMENT — PAIN DESCRIPTION - FREQUENCY: FREQUENCY: CONTINUOUS

## 2021-05-18 ASSESSMENT — PAIN DESCRIPTION - DESCRIPTORS: DESCRIPTORS: CRAMPING

## 2021-05-18 NOTE — ED NOTES
Bed: 04  Expected date:   Expected time:   Means of arrival:   Comments:  Lizett Blake [address], RN  05/18/21 1951

## 2021-05-19 LAB
ANION GAP SERPL CALCULATED.3IONS-SCNC: 12 MMOL/L (ref 3–16)
ANION GAP SERPL CALCULATED.3IONS-SCNC: 6 MMOL/L (ref 3–16)
BETA-HYDROXYBUTYRATE: 0.19 MMOL/L (ref 0–0.27)
BUN BLDV-MCNC: 10 MG/DL (ref 7–20)
BUN BLDV-MCNC: 12 MG/DL (ref 7–20)
CALCIUM SERPL-MCNC: 8.8 MG/DL (ref 8.3–10.6)
CALCIUM SERPL-MCNC: 9.4 MG/DL (ref 8.3–10.6)
CHLORIDE BLD-SCNC: 100 MMOL/L (ref 99–110)
CHLORIDE BLD-SCNC: 105 MMOL/L (ref 99–110)
CO2: 22 MMOL/L (ref 21–32)
CO2: 30 MMOL/L (ref 21–32)
CREAT SERPL-MCNC: 1 MG/DL (ref 0.6–1.2)
CREAT SERPL-MCNC: 1.2 MG/DL (ref 0.6–1.2)
GFR AFRICAN AMERICAN: 55
GFR AFRICAN AMERICAN: >60
GFR NON-AFRICAN AMERICAN: 45
GFR NON-AFRICAN AMERICAN: 56
GLUCOSE BLD-MCNC: 149 MG/DL (ref 70–99)
GLUCOSE BLD-MCNC: 154 MG/DL (ref 70–99)
GLUCOSE BLD-MCNC: 198 MG/DL (ref 70–99)
GLUCOSE BLD-MCNC: 255 MG/DL (ref 70–99)
GLUCOSE BLD-MCNC: 338 MG/DL (ref 70–99)
GLUCOSE BLD-MCNC: 51 MG/DL (ref 70–99)
GLUCOSE BLD-MCNC: 582 MG/DL (ref 70–99)
GLUCOSE BLD-MCNC: 59 MG/DL (ref 70–99)
GLUCOSE BLD-MCNC: 67 MG/DL (ref 70–99)
PERFORMED ON: ABNORMAL
POTASSIUM REFLEX MAGNESIUM: 3.7 MMOL/L (ref 3.5–5.1)
POTASSIUM REFLEX MAGNESIUM: 4.2 MMOL/L (ref 3.5–5.1)
SODIUM BLD-SCNC: 134 MMOL/L (ref 136–145)
SODIUM BLD-SCNC: 141 MMOL/L (ref 136–145)
TROPONIN: <0.01 NG/ML
TROPONIN: <0.01 NG/ML

## 2021-05-19 PROCEDURE — G0378 HOSPITAL OBSERVATION PER HR: HCPCS

## 2021-05-19 PROCEDURE — 6370000000 HC RX 637 (ALT 250 FOR IP): Performed by: INTERNAL MEDICINE

## 2021-05-19 PROCEDURE — 6360000002 HC RX W HCPCS: Performed by: INTERNAL MEDICINE

## 2021-05-19 PROCEDURE — 80048 BASIC METABOLIC PNL TOTAL CA: CPT

## 2021-05-19 PROCEDURE — 96372 THER/PROPH/DIAG INJ SC/IM: CPT

## 2021-05-19 PROCEDURE — 84484 ASSAY OF TROPONIN QUANT: CPT

## 2021-05-19 PROCEDURE — 96361 HYDRATE IV INFUSION ADD-ON: CPT

## 2021-05-19 PROCEDURE — 36415 COLL VENOUS BLD VENIPUNCTURE: CPT

## 2021-05-19 PROCEDURE — 99255 IP/OBS CONSLTJ NEW/EST HI 80: CPT | Performed by: INTERNAL MEDICINE

## 2021-05-19 PROCEDURE — 1200000000 HC SEMI PRIVATE

## 2021-05-19 PROCEDURE — 2580000003 HC RX 258: Performed by: INTERNAL MEDICINE

## 2021-05-19 RX ORDER — CHLORTHALIDONE 25 MG/1
25 TABLET ORAL DAILY
Status: DISCONTINUED | OUTPATIENT
Start: 2021-05-19 | End: 2021-05-20 | Stop reason: HOSPADM

## 2021-05-19 RX ORDER — TRAZODONE HYDROCHLORIDE 50 MG/1
100 TABLET ORAL NIGHTLY
Status: DISCONTINUED | OUTPATIENT
Start: 2021-05-19 | End: 2021-05-20 | Stop reason: HOSPADM

## 2021-05-19 RX ORDER — DEXTROSE MONOHYDRATE 50 MG/ML
100 INJECTION, SOLUTION INTRAVENOUS PRN
Status: DISCONTINUED | OUTPATIENT
Start: 2021-05-19 | End: 2021-05-20 | Stop reason: HOSPADM

## 2021-05-19 RX ORDER — SODIUM CHLORIDE 0.9 % (FLUSH) 0.9 %
5-40 SYRINGE (ML) INJECTION PRN
Status: DISCONTINUED | OUTPATIENT
Start: 2021-05-19 | End: 2021-05-20 | Stop reason: HOSPADM

## 2021-05-19 RX ORDER — ATORVASTATIN CALCIUM 10 MG/1
10 TABLET, FILM COATED ORAL DAILY
Status: DISCONTINUED | OUTPATIENT
Start: 2021-05-19 | End: 2021-05-20

## 2021-05-19 RX ORDER — CLONAZEPAM 0.5 MG/1
0.5 TABLET ORAL 3 TIMES DAILY PRN
Status: DISCONTINUED | OUTPATIENT
Start: 2021-05-19 | End: 2021-05-20 | Stop reason: HOSPADM

## 2021-05-19 RX ORDER — GABAPENTIN 300 MG/1
600 CAPSULE ORAL 2 TIMES DAILY
Status: DISCONTINUED | OUTPATIENT
Start: 2021-05-19 | End: 2021-05-20 | Stop reason: HOSPADM

## 2021-05-19 RX ORDER — DEXTROSE MONOHYDRATE 25 G/50ML
12.5 INJECTION, SOLUTION INTRAVENOUS PRN
Status: DISCONTINUED | OUTPATIENT
Start: 2021-05-19 | End: 2021-05-20 | Stop reason: HOSPADM

## 2021-05-19 RX ORDER — LISINOPRIL 20 MG/1
20 TABLET ORAL 2 TIMES DAILY
Status: DISCONTINUED | OUTPATIENT
Start: 2021-05-19 | End: 2021-05-20 | Stop reason: HOSPADM

## 2021-05-19 RX ORDER — LISINOPRIL 10 MG/1
10 TABLET ORAL DAILY
Status: DISCONTINUED | OUTPATIENT
Start: 2021-05-19 | End: 2021-05-19

## 2021-05-19 RX ORDER — PROMETHAZINE HYDROCHLORIDE 25 MG/1
12.5 TABLET ORAL EVERY 6 HOURS PRN
Status: DISCONTINUED | OUTPATIENT
Start: 2021-05-19 | End: 2021-05-20 | Stop reason: HOSPADM

## 2021-05-19 RX ORDER — SODIUM CHLORIDE 9 MG/ML
25 INJECTION, SOLUTION INTRAVENOUS PRN
Status: DISCONTINUED | OUTPATIENT
Start: 2021-05-19 | End: 2021-05-20 | Stop reason: HOSPADM

## 2021-05-19 RX ORDER — ACETAMINOPHEN 325 MG/1
650 TABLET ORAL EVERY 6 HOURS PRN
Status: DISCONTINUED | OUTPATIENT
Start: 2021-05-19 | End: 2021-05-20 | Stop reason: HOSPADM

## 2021-05-19 RX ORDER — ASPIRIN 81 MG/1
81 TABLET ORAL DAILY
Status: DISCONTINUED | OUTPATIENT
Start: 2021-05-19 | End: 2021-05-20 | Stop reason: HOSPADM

## 2021-05-19 RX ORDER — INSULIN GLARGINE 100 [IU]/ML
15 INJECTION, SOLUTION SUBCUTANEOUS ONCE
Status: COMPLETED | OUTPATIENT
Start: 2021-05-19 | End: 2021-05-19

## 2021-05-19 RX ORDER — NICOTINE POLACRILEX 4 MG
15 LOZENGE BUCCAL PRN
Status: DISCONTINUED | OUTPATIENT
Start: 2021-05-19 | End: 2021-05-20 | Stop reason: HOSPADM

## 2021-05-19 RX ORDER — ACETAMINOPHEN 650 MG/1
650 SUPPOSITORY RECTAL EVERY 6 HOURS PRN
Status: DISCONTINUED | OUTPATIENT
Start: 2021-05-19 | End: 2021-05-20 | Stop reason: HOSPADM

## 2021-05-19 RX ORDER — SODIUM CHLORIDE 0.9 % (FLUSH) 0.9 %
5-40 SYRINGE (ML) INJECTION EVERY 12 HOURS SCHEDULED
Status: DISCONTINUED | OUTPATIENT
Start: 2021-05-19 | End: 2021-05-20 | Stop reason: HOSPADM

## 2021-05-19 RX ORDER — ONDANSETRON 2 MG/ML
4 INJECTION INTRAMUSCULAR; INTRAVENOUS EVERY 6 HOURS PRN
Status: DISCONTINUED | OUTPATIENT
Start: 2021-05-19 | End: 2021-05-20 | Stop reason: HOSPADM

## 2021-05-19 RX ORDER — SODIUM CHLORIDE 9 MG/ML
INJECTION, SOLUTION INTRAVENOUS CONTINUOUS
Status: ACTIVE | OUTPATIENT
Start: 2021-05-19 | End: 2021-05-20

## 2021-05-19 RX ORDER — CALCIUM CARBONATE/VITAMIN D3 250-3.125
500 TABLET ORAL DAILY
Status: DISCONTINUED | OUTPATIENT
Start: 2021-05-19 | End: 2021-05-20 | Stop reason: HOSPADM

## 2021-05-19 RX ORDER — PANTOPRAZOLE SODIUM 40 MG/1
40 TABLET, DELAYED RELEASE ORAL DAILY
Status: DISCONTINUED | OUTPATIENT
Start: 2021-05-19 | End: 2021-05-20 | Stop reason: HOSPADM

## 2021-05-19 RX ORDER — HYDRALAZINE HYDROCHLORIDE 20 MG/ML
10 INJECTION INTRAMUSCULAR; INTRAVENOUS EVERY 6 HOURS PRN
Status: DISCONTINUED | OUTPATIENT
Start: 2021-05-19 | End: 2021-05-20 | Stop reason: HOSPADM

## 2021-05-19 RX ORDER — POLYETHYLENE GLYCOL 3350 17 G/17G
17 POWDER, FOR SOLUTION ORAL DAILY PRN
Status: DISCONTINUED | OUTPATIENT
Start: 2021-05-19 | End: 2021-05-20 | Stop reason: HOSPADM

## 2021-05-19 RX ORDER — FERROUS SULFATE 325(65) MG
325 TABLET ORAL
Status: DISCONTINUED | OUTPATIENT
Start: 2021-05-19 | End: 2021-05-20 | Stop reason: HOSPADM

## 2021-05-19 RX ORDER — ATORVASTATIN CALCIUM 10 MG/1
10 TABLET, FILM COATED ORAL NIGHTLY
Status: DISCONTINUED | OUTPATIENT
Start: 2021-05-19 | End: 2021-05-20 | Stop reason: HOSPADM

## 2021-05-19 RX ORDER — NITROGLYCERIN 0.4 MG/1
0.4 TABLET SUBLINGUAL EVERY 5 MIN PRN
Status: DISCONTINUED | OUTPATIENT
Start: 2021-05-19 | End: 2021-05-20 | Stop reason: HOSPADM

## 2021-05-19 RX ORDER — INSULIN GLARGINE 100 [IU]/ML
30 INJECTION, SOLUTION SUBCUTANEOUS EVERY MORNING
Status: DISCONTINUED | OUTPATIENT
Start: 2021-05-19 | End: 2021-05-20 | Stop reason: HOSPADM

## 2021-05-19 RX ORDER — OXYCODONE AND ACETAMINOPHEN 7.5; 325 MG/1; MG/1
1 TABLET ORAL EVERY 6 HOURS PRN
Status: DISCONTINUED | OUTPATIENT
Start: 2021-05-19 | End: 2021-05-20 | Stop reason: HOSPADM

## 2021-05-19 RX ADMIN — ATORVASTATIN CALCIUM 10 MG: 10 TABLET, FILM COATED ORAL at 20:44

## 2021-05-19 RX ADMIN — INSULIN GLARGINE 30 UNITS: 100 INJECTION, SOLUTION SUBCUTANEOUS at 08:58

## 2021-05-19 RX ADMIN — ATORVASTATIN CALCIUM 10 MG: 10 TABLET, FILM COATED ORAL at 08:06

## 2021-05-19 RX ADMIN — ACETAMINOPHEN 650 MG: 325 TABLET ORAL at 12:00

## 2021-05-19 RX ADMIN — TICAGRELOR 90 MG: 90 TABLET ORAL at 02:22

## 2021-05-19 RX ADMIN — TRAZODONE HYDROCHLORIDE 100 MG: 50 TABLET ORAL at 20:44

## 2021-05-19 RX ADMIN — PANTOPRAZOLE SODIUM 40 MG: 40 TABLET, DELAYED RELEASE ORAL at 08:07

## 2021-05-19 RX ADMIN — LISINOPRIL 20 MG: 20 TABLET ORAL at 20:44

## 2021-05-19 RX ADMIN — TICAGRELOR 90 MG: 90 TABLET ORAL at 20:44

## 2021-05-19 RX ADMIN — INSULIN LISPRO 2 UNITS: 100 INJECTION, SOLUTION INTRAVENOUS; SUBCUTANEOUS at 08:56

## 2021-05-19 RX ADMIN — SODIUM CHLORIDE, PRESERVATIVE FREE 10 ML: 5 INJECTION INTRAVENOUS at 08:59

## 2021-05-19 RX ADMIN — INSULIN GLARGINE 15 UNITS: 100 INJECTION, SOLUTION SUBCUTANEOUS at 02:22

## 2021-05-19 RX ADMIN — SODIUM CHLORIDE: 9 INJECTION, SOLUTION INTRAVENOUS at 02:25

## 2021-05-19 RX ADMIN — ACETAMINOPHEN 650 MG: 325 TABLET ORAL at 02:22

## 2021-05-19 RX ADMIN — TICAGRELOR 90 MG: 90 TABLET ORAL at 08:06

## 2021-05-19 RX ADMIN — LISINOPRIL 10 MG: 10 TABLET ORAL at 08:07

## 2021-05-19 RX ADMIN — GABAPENTIN 600 MG: 300 CAPSULE ORAL at 02:22

## 2021-05-19 RX ADMIN — GABAPENTIN 600 MG: 300 CAPSULE ORAL at 08:06

## 2021-05-19 RX ADMIN — ENOXAPARIN SODIUM 40 MG: 40 INJECTION SUBCUTANEOUS at 08:06

## 2021-05-19 RX ADMIN — Medication 500 MG: at 08:06

## 2021-05-19 RX ADMIN — OXYCODONE HYDROCHLORIDE AND ACETAMINOPHEN 1 TABLET: 7.5; 325 TABLET ORAL at 20:44

## 2021-05-19 RX ADMIN — FERROUS SULFATE TAB 325 MG (65 MG ELEMENTAL FE) 325 MG: 325 (65 FE) TAB at 08:06

## 2021-05-19 RX ADMIN — CHLORTHALIDONE 25 MG: 25 TABLET ORAL at 16:38

## 2021-05-19 RX ADMIN — ASPIRIN 81 MG: 81 TABLET, COATED ORAL at 08:06

## 2021-05-19 RX ADMIN — OXYCODONE HYDROCHLORIDE AND ACETAMINOPHEN 1 TABLET: 7.5; 325 TABLET ORAL at 08:06

## 2021-05-19 RX ADMIN — GABAPENTIN 600 MG: 300 CAPSULE ORAL at 20:44

## 2021-05-19 RX ADMIN — INSULIN LISPRO 2 UNITS: 100 INJECTION, SOLUTION INTRAVENOUS; SUBCUTANEOUS at 13:04

## 2021-05-19 RX ADMIN — SODIUM CHLORIDE: 9 INJECTION, SOLUTION INTRAVENOUS at 13:04

## 2021-05-19 ASSESSMENT — PAIN SCALES - GENERAL
PAINLEVEL_OUTOF10: 8
PAINLEVEL_OUTOF10: 5
PAINLEVEL_OUTOF10: 9
PAINLEVEL_OUTOF10: 5
PAINLEVEL_OUTOF10: 8
PAINLEVEL_OUTOF10: 8

## 2021-05-19 ASSESSMENT — PAIN DESCRIPTION - LOCATION
LOCATION: HEAD
LOCATION: HEAD

## 2021-05-19 ASSESSMENT — PAIN DESCRIPTION - PAIN TYPE: TYPE: ACUTE PAIN

## 2021-05-19 ASSESSMENT — PAIN DESCRIPTION - DESCRIPTORS: DESCRIPTORS: ACHING

## 2021-05-19 NOTE — ED NOTES

## 2021-05-19 NOTE — ED NOTES
Nitro 0.4mg  SL #2 given per ACLS protocol. Vs HR 62 /63.  Rates CP 1/10       Fabiano Mercado RN  05/18/21 [id number]

## 2021-05-19 NOTE — ED PROVIDER NOTES
**ADVANCED PRACTICE PROVIDER, I HAVE EVALUATED THIS [facility]  ED  EMERGENCY DEPARTMENT ENCOUNTER      Pt Name: Cinda KEYESYN:[id number]  [place] 1960  Date of evaluation: 5/18/2021  Provider: SARTHAK Stahl CNP      Chief Complaint:    Chief Complaint   Patient presents with    Chest Pain     pt  to ED via EMS with c/o intermittent mid sternal chest pain for three days with associated SOB, reports pain does not radiate, denies nausea or vomiting with pain, reports the pain has been worsening over the past  three days, given 1 SL nitro and 324 ASA by EMS, brought cp from 8 to 5        Nursing Notes, Past Medical Hx, Past Surgical Hx, Social Hx, Allergies, and Family Hx were all reviewed and agreed with or any disagreements were addressed in the HPI.    HPI:  (Location, Duration, Timing, Severity, Quality, Assoc Sx, Context, Modifying factors)  This is a  57 y.o. female presents emergency room with midsternal chest pain for the past 3 days, states it started Sunday afternoon when she was just at home, states that she has had the pain again today, denies any cough, congestion, fever or chills, she does report occasionally feeling short of breath when she, she denies any concern for COVID-19. She called 911 today because of the pain. EMS gave the patient nitro and aspirin, she now has a headache, rates the headache a 2 out of 10. She denies any nausea vomiting or diarrhea, has not taken any medicine for the chest pain. She does report that she has a history of coronary artery disease, diabetes, high cholesterol and high blood pressure. She states that she had a stent placed in June 2020. She sees cardiology at [facility].  She denies any additional complaints, no additional aggravating relieving factors. Patient presents awake, alert and in no acute distress or toxic appearance.     PastMedical/Surgical History:      Diagnosis Date    Abnormal brain MRI 7/20/2017    Partially empty sella and minimal chronic small vessel ischemic disease    Acute bilateral low back pain without sciatica 11/2/2016    SHARRON (acute kidney injury) ([facility] [address] 7/5/2017    Arthritis     back    Bipolar disorder ([address] 10/18/2008    CAD (coronary artery disease)     stent placed 6/8/20    Cancer [facility]) 2015    bilateral breast:s/p lumpectomy/radiation:under care care of breast specialist:Dr. Boone     Carotid stenosis, bilateral:<50%:per US 7/2016 7/15/2016    Carpal tunnel syndrome 10/18/2008    Cervical cancer screening 2014    Nml per pt'.  Coronary artery disease of native artery of native heart with stable angina pectoris ([address] 6/9/2020    DDD (degenerative disc disease), lumbar 7/18/2018    Depression     under care of pschiatrist:Dr. 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     Scoliosis     Spondylosis of lumbar region without myelopathy or radiculopathy 3/10/2017    Transient cerebral ischemia 07/15/2016    TIA:7/10/16    Unspecified cerebral artery occlusion with cerebral infarction     TIA         Procedure Laterality Date    BREAST LUMPECTOMY  2015    Bilateral:breast cancer    CARDIAC CATHETERIZATION  06/08/2020    Dr. Brian Mcfadden), DAYANA to Diag 1    COLONOSCOPY N/A 2/1/2021    COLONOSCOPY DIAGNOSTIC performed by Yen Siddiqui MD at [place] [id number]  2/3/2021    CT BONE MARROW BIOPSY 2/3/2021 Bart Dorantes MD [facility] CT SCAN    HYSTERECTOMY      Benign:no cervical cancer per pt'    KIDNEY REMOVAL      right    OTHER SURGICAL HISTORY Right     orif right ankle    TUBAL LIGATION      UPPER GASTROINTESTINAL ENDOSCOPY N/A 1/29/2021    EGD BIOPSY performed by Daniela Chanel MD at [address]       Medications:  Previous Medications    ASPIRIN 81 MG TABLET    Take 81 mg by mouth daily    ATORVASTATIN (LIPITOR) 10 MG TABLET    Take 10 mg by mouth nightly    BLOOD PRESSURE MONITOR GINA    Check BP at home once or twice a day    CALCIUM CARBONATE-VITAMIN D (CALTRATE) 600-400 MG-UNIT TABS PER TAB    Take 1 tablet by mouth daily     CETIRIZINE (ZYRTEC) 10 MG TABLET    Take 10 mg by mouth daily    CLONAZEPAM (KLONOPIN) 0.5 MG TABLET    Take 0.5 mg by mouth 3 times daily as needed. FERROUS SULFATE (IRON 325) 325 (65 FE) MG TABLET    Take 325 mg by mouth daily (with breakfast)    GABAPENTIN (NEURONTIN) 600 MG TABLET    Take 600 mg by mouth 3 times daily    GLUCAGON 1 MG INJECTION    Inject 1 mg into the muscle See Admin Instructions Follow package directions for low blood sugar. INSULIN GLARGINE (LANTUS SOLOSTAR) 100 UNIT/ML INJECTION PEN    Inject 25 Units into the skin every morning    INSULIN PEN NEEDLE (UNIFINE PENTIPS) 32G X 4 MM MISC    USE AS DIRECTED 3 TIMES A DAY    INSULIN SYRINGE-NEEDLE U-100 31G X 5/16\" 0.3 ML MISC    USE 3 TIMES A DAY BEFORE MEALS    LISINOPRIL (PRINIVIL;ZESTRIL) 10 MG TABLET    TAKE 1 TABLET BY MOUTH EVERY DAY    NITROGLYCERIN (NITROSTAT) 0.4 MG SL TABLET    up to max of 3 total doses. If no relief after 1 dose, call 911. OXYCODONE-ACETAMINOPHEN (PERCOCET) 7.5-325 MG PER TABLET    TAKE 1 TABLET BY MOUTH EVERY 6 (SIX) HOURS AS NEEDED FOR UP TO 28 DAYS.     PALIPERIDONE (INVEGA) 9 MG EXTENDED RELEASE TABLET    Take 9 mg by mouth every morning     PANTOPRAZOLE (PROTONIX) 40 MG TABLET    Take 40 mg by mouth daily     SIMVASTATIN (ZOCOR) 20 MG TABLET TAKE 1 TABLET BY MOUTH EVERYDAY AT BEDTIME    TICAGRELOR (BRILINTA) 90 MG TABS TABLET    Take 1 tablet by mouth 2 times daily    TRAZODONE (DESYREL) 100 MG TABLET    Take 100 mg by mouth nightly    TRUE METRIX BLOOD GLUCOSE TEST STRIP    USE AS DIRECTED TO TEST 4 TIMES A DAY         Review of Systems:  Review of Systems   Constitutional: Negative for chills and fever. HENT: Negative for congestion. Respiratory: Negative for cough, shortness of breath and wheezing. Cardiovascular: Positive for chest pain. She complains of midsternal chest pain for the past 3 days, states it started Sunday afternoon when she was just at home, states that she has had the pain again today, denies any cough, congestion, fever or chills, she does report occasionally feeling short of breath when she, she denies any concern for COVID-19. Gastrointestinal: Negative for abdominal pain, diarrhea, nausea and vomiting. Genitourinary: Negative for difficulty urinating, dysuria and frequency. Musculoskeletal: Negative for back pain. Skin: Negative for color change. Neurological: Negative for weakness, numbness and headaches. Positives and Pertinent negatives as per HPI. Except as noted above in the ROS, problem specific ROS was completed and is negative. Physical Exam:  Physical Exam  Vitals and nursing note reviewed. Constitutional:       Appearance: She is well-developed. She is not diaphoretic. HENT:      Head: Normocephalic. Right Ear: External ear normal.      Left Ear: External ear normal.   Eyes:      General: No scleral icterus. Right eye: No discharge. Left eye: No discharge. Cardiovascular:      Rate and Rhythm: Bradycardia present. Comments: Normal S1 and 2, peripheral pulses are 2+ with no edema observed, sinus bradycardic on the monitor at 58 bpm  Pulmonary:      Effort: Pulmonary effort is normal. No respiratory distress.       Comments: Lungs are clear anteriorly, diminished posteriorly, she is not tachypneic or dyspneic and saturations 97% on room air  Abdominal:      Palpations: Abdomen is soft. Comments: Abdomen is generally soft and nondistended. Bowel sounds are hypoactive, no significant tenderness guarding or rebound tenderness. Musculoskeletal:         General: Normal range of motion. Cervical back: Normal range of motion and neck supple. Skin:     General: Skin is warm. Capillary Refill: Capillary refill takes less than 2 seconds. Coloration: Skin is not pale. Neurological:      General: No focal deficit present. Mental Status: She is alert and oriented to person, place, and time. GCS: GCS eye subscore is 4. GCS verbal subscore is 5. GCS motor subscore is 6. Cranial Nerves: Cranial nerves are intact. Comments: Although, the patient is bradycardic she is awake, alert, neurologically intact with no deficits. She is following commands correctly.    Psychiatric:         Behavior: Behavior normal.         MEDICAL DECISION MAKING    Vitals:    Vitals:    05/18/21 2139 05/18/21 2155 05/18/21 2204 05/18/21 2213   BP: (!) 127/58 (!) 142/68 129/63 125/65   Pulse: 60 62 62 57   Resp: 14 14 14 14   Temp:       TempSrc:       SpO2: 96% 97% 98% 96%   Weight:       Height:           LABS:  Labs Reviewed   CBC WITH AUTO DIFFERENTIAL - Abnormal; Notable for the following components:       Result Value    RBC 3.84 (*)     Hemoglobin 10.8 (*)     Hematocrit 34.8 (*)     All other components within normal limits    Narrative:     Performed at:  [address]   Phone [phone]   COMPREHENSIVE METABOLIC PANEL - Abnormal; Notable for the following components:    Sodium 129 (*)     Chloride 93 (*)     Glucose 839 (*)     CREATININE 1.6 (*)     GFR Non- 33 (*)     GFR African American 39 (*)     Albumin/Globulin Ratio 0.9 (*)     Alkaline Phosphatase 198 (*)     All other components within normal limits    Narrative:     Leann Steen tel. [phone],  Chemistry results called to and read back by PONCHO Gerard, 05/18/2021  21:20, by Montse Holden  Performed at:  [place] [address] [employer]   Phone [phone]   BLOOD GAS, VENOUS - Abnormal; Notable for the following components:    pH, Kyle 7.499 (*)     pCO2, Kyle 31.9 (*)     pO2, Kyle 132.9 (*)     Carboxyhemoglobin 5.2 (*)     All other components within normal limits    Narrative:     Performed at:  [place] [address] [employer]   Phone [phone]   URINALYSIS - Abnormal; Notable for the following components:    Glucose, Ur >=1000 (*)     All other components within normal limits    Narrative:     Performed at:  [place] [address] [employer]   Phone [phone]   POCT GLUCOSE - Abnormal; Notable for the following components:    POC Glucose >600 (*)     All other components within normal limits    Narrative:     Performed at:  [address], [facility] [employer]   Phone [phone]   TROPONIN    Narrative:     Leann Steen tel. [phone],  Chemistry results called to and read back by PONCHO Gerard, 05/18/2021  21:20, by Montse Holden  Performed at:  [place] [address] [employer]   Phone [phone] of labs reviewed and werenegative at this time or not returned at the time of this note.     RADIOLOGY:   Non-plain film images such as CT, Ultrasound and MRI are read by the radiologist. Ellen AZUL APRN - CNP have directly visualized the radiologic plain film image(s) with the below findings:        Interpretation per the Radiologist below, if available at the time of this note:    XR CHEST PORTABLE Final Result   Stable chronic changes with no acute abnormality seen. MEDICAL DECISION MAKING / ED COURSE:    Because of high probability of sudden clinical deterioration of the patient's condition and risk of further deterioration, critical care time required my full attention to the patient's condition; which included chart data review, documentation, medication ordering, reviewing the patient's old records, reevaluation patient's cardiac, pulmonary and neurological status. Reevaluation of vital signs. Consultations with ED attending and admitting physician. Ordering, interpreting reviewing diagnostic testing. Therefore a critical care time was 35 minutes of direct attention to the patient's condition did not include time spent on procedures. PROCEDURES:   Procedures    None    Patient was given:  Medications   nitroGLYCERIN (NITROSTAT) SL tablet 0.4 mg (0.4 mg Sublingual Given 5/18/21 2204)   lactated ringers infusion 1,000 mL (1,000 mLs Intravenous New Bag 5/18/21 2149)   nitroglycerin (NITRO-BID) 2 % ointment 0.5 inch (has no administration in time range)   insulin regular (HUMULIN R;NOVOLIN R) injection 10 Units (10 Units Intravenous Given 5/18/21 2159)   ondansetron (ZOFRAN) injection 4 mg (4 mg Intravenous Given 5/18/21 2150)        She complains of midsternal chest pain for the past 3 days, states it started Sunday afternoon when she was just at home, states that she has had the pain again today, denies any cough, congestion, fever or chills, she does report occasionally feeling short of breath when she, she denies any concern for COVID-19. After evaluation and examination patient IV access, blood work, chest x-ray and EKG were ordered. Patient was given aspirin nitro and described, she did have relief with nitroglycerin, she was ordered a second dose. CBC shows no acute sepsis or anemia.   Metabolic panel shows some underlying dehydration with a sodium of 129, chloride of 93, creatinine of 1.6 and GFR of 39 however her blood sugar is 839, this is most likely the cause of this, patient was ordered a fluid bolus along with insulin. Liver functions are generally normal except her alk phos is 198, when I trend her labs this trends to be similar to previous when I compared to March of this year. Troponin is negative. EKG shows bradycardia rate of 59 bpm, no acute ST elevation, please see Dr. Jerry Clink EKG interpretation note. Chest x-ray shows no new acute abnormality. Blood gases on due to her hyperglycemia, no acute acidosis or alkalosis, little concern for DKA. However, she does report improvement of chest pain after second nitroglycerin, chest pain has resolved, ordered her Nitropaste. However, due to the patient's elevation in her blood sugar and active chest pain I spoke with the attending physician about admitting her to the hospital, hospitalist was paged for admission via perfect serve. The patient tolerated their visit well. I evaluated the patient. The physician was available for consultation as needed. The patient and / or the family were informed of the results of any tests, a time was given to answer questions, a plan was proposed and they agreed with plan. Therefore, patient will be admitted to the hospital for further evaluation management of care. CLINICAL IMPRESSION:  1. Acute chest pain    2. History of high blood pressure    3. Type 2 diabetes mellitus with hyperglycemia, with long-term current use of insulin ([facility] [address]        DISPOSITION Decision To Admit 05/18/2021 09:30:14 PM      PATIENT REFERRED TO:  No follow-up provider specified.     DISCHARGE MEDICATIONS:  New Prescriptions    No medications on file       DISCONTINUED MEDICATIONS:  Discontinued Medications    No medications on file              (Please note the MDM and HPI sections of this note were completed with a voice recognition program.  Efforts were made to edit the dictations but occasionally words are mis-transcribed.)    Electronically signed, Lidya July, APRNALINI - CNP,          Lidya JulySARTHAK - PIETER  05/18/21 1012

## 2021-05-19 NOTE — ED PROVIDER NOTES
I personally evaluated and examined the patient in conjunction with the APC and agree with the assessment, treatment plan and disposition of the patient has recorded by the APC. I reviewed pertinent nurse's notes, triage notes, vital signs, past medical history, family and social history, medications, and allergies. Complete review of systems was conducted by the mid-level provider and/or myself. Review of systems is negative except as documented in the history of present illness. EKG: As interpreted by myself shows sinus bradycardia at a rate of 59 beats per in comparing to both QRS QTC normal.  No acute ischemic findings. No significant changes when compared to March 2021. Patient is placed on cardiac with vision pulse oximetry monitoring. Cardiac monitor as interpreted myself shows normal sinus rhythm. FINAL IMPRESSION     1. Acute chest pain    2. History of high blood pressure    3. Type 2 diabetes mellitus with hyperglycemia, with long-term current use of insulin ([facility]            Electronically signed by: JANES Mcclain MD  05/18/21 1954

## 2021-05-19 NOTE — ED NOTES
RN rounded on pt. Pt toileted to bed side commode without incident. VS updated and as charted. Pt remains on cardiac monitor. Pt given something to drink. Pt has no further needs at this time. Pt resting in bed, call light within reach. Pt denies any questions.         Elizabeth Jorge RN  05/18/21 4592

## 2021-05-19 NOTE — ED NOTES
Attempted IV x3 without success. Some lab work obtained. Pt remains on cardiac monitor, VS as charted, warm blankest given, pt alert and oriented and without distress. Call light within reach.       Kamari Dueñas RN  05/18/21 2023

## 2021-05-19 NOTE — CONSULTS
CARDIOLOGY CONSULTATION        Patient Name: Neena Baldwin  Date of admission: 5/18/2021  7:51 PM  Admission Dx: Chest pain [R07.9]  Requesting Physician: Иван Pickens MD  Primary Care physician: Derik Gold    Reason for Consultation/Chief Complaint: Chest pain     History of Present Illness:     Neena Baldwin is a 57 y.o. patient with a prior medical history notable for coronary artery disease with prior PCI to first Diagonal 6/2020, carotid disease, diabetes, hypertension, hyperlipidemia, congestive heart failure, CKD with history of solitary kidney status post nephrectomy for Wilms tumor as child,  who presented to the hospital 5/18/21 with complaints of chest pain. Patient endorses 3 days of substernal, intermittent chest pain \"cramping\" with no radiation. Reports when up/walking around would feel like a worsening heaviness. She describes associated nausea/dyspnea. Partial relief with SL NTG by EMS. She reports mild chest discomfort at the present time persisting all throughout today. Recent stress as she learned over weekend that her friend's son had been murdered. She complains now of headaches and dizziness since this morning. She feels she's \"spinning in circles. \" +nausea. No emesis. On presentation, labs notable for hyperglycemia >800, SCR 1.6 (baseline 0.9), Serial Troponins negative. EKG showed sinus bradycardia, no ischemic changes. COVID-19 negative. CXR negative. Denies paroxysmal nocturnal dyspnea, orthopnea, bendopnea, increasing lower extremity edema or weight gain. She does admit to 3 loose BM's today - possibly bloody, greasy. No abd pain.        Past Medical History:   has a past medical history of Abnormal brain MRI, Acute bilateral low back pain without sciatica, SHARRON (acute kidney injury) ([address] Arthritis, Bipolar disorder ([facility] CAD (coronary artery disease), Cancer ([facility] Carotid stenosis, bilateral:<50%:per US 7/2016, Carpal tunnel syndrome, Cervical directions for low blood sugar.  2/19/19  Yes Karel Pastor MD   aspirin 81 MG tablet Take 81 mg by mouth daily   Yes Historical Provider, MD   gabapentin (NEURONTIN) 600 MG tablet Take 600 mg by mouth 3 times daily   Yes Historical Provider, MD   traZODone (DESYREL) 100 MG tablet Take 100 mg by mouth nightly   Yes Historical Provider, MD        CURRENT Medications:  aspirin EC tablet 81 mg, Daily  atorvastatin (LIPITOR) tablet 10 mg, Nightly  oyster shell calcium/vitamin D 250-125 MG-UNIT per tablet 500 mg, Daily  clonazePAM (KLONOPIN) tablet 0.5 mg, TID PRN  ferrous sulfate (IRON 325) tablet 325 mg, Daily with breakfast  gabapentin (NEURONTIN) capsule 600 mg, BID  insulin glargine (LANTUS) injection vial 30 Units, QAM  lisinopril (PRINIVIL;ZESTRIL) tablet 10 mg, Daily  oxyCODONE-acetaminophen (PERCOCET) 7.5-325 MG per tablet 1 tablet, Q6H PRN  pantoprazole (PROTONIX) tablet 40 mg, Daily  atorvastatin (LIPITOR) tablet 10 mg, Daily  ticagrelor (BRILINTA) tablet 90 mg, BID  traZODone (DESYREL) tablet 100 mg, Nightly  glucose (GLUTOSE) 40 % oral gel 15 g, PRN  dextrose 50 % IV solution, PRN  glucagon (rDNA) injection 1 mg, PRN  dextrose 5 % solution, PRN  insulin lispro (HUMALOG) injection vial 0-12 Units, TID WC  insulin lispro (HUMALOG) injection vial 0-6 Units, Nightly  sodium chloride flush 0.9 % injection 5-40 mL, 2 times per day  sodium chloride flush 0.9 % injection 5-40 mL, PRN  0.9 % sodium chloride infusion, PRN  enoxaparin (LOVENOX) injection 40 mg, Daily  promethazine (PHENERGAN) tablet 12.5 mg, Q6H PRN   Or  ondansetron (ZOFRAN) injection 4 mg, Q6H PRN  polyethylene glycol (GLYCOLAX) packet 17 g, Daily PRN  acetaminophen (TYLENOL) tablet 650 mg, Q6H PRN   Or  acetaminophen (TYLENOL) suppository 650 mg, Q6H PRN  0.9 % sodium chloride infusion, Continuous  nitroGLYCERIN (NITROSTAT) SL tablet 0.4 mg, Q5 Min PRN  nitroGLYCERIN (NITROSTAT) SL tablet 0.4 mg, Q5 Min PRN        Allergies:  Morphine, HGB 10.8 05/18/2021    HCT 34.8 05/18/2021    MCV 90.6 05/18/2021    RDW 13.5 05/18/2021     05/18/2021     CMP:  Lab Results   Component Value Date     05/19/2021    K 3.7 05/19/2021     05/19/2021    CO2 30 05/19/2021    BUN 10 05/19/2021    CREATININE 1.0 05/19/2021    GFRAA >60 05/19/2021    GFRAA >60 05/29/2013    AGRATIO 0.9 05/18/2021    LABGLOM 56 05/19/2021    GLUCOSE 255 05/19/2021    PROT 7.6 05/18/2021    PROT 8.1 01/05/2013    CALCIUM 9.4 05/19/2021    BILITOT <0.2 05/18/2021    ALKPHOS 198 05/18/2021    AST 26 05/18/2021    ALT 34 05/18/2021     PT/INR:  No results found for: PTINR  HgBA1c:  Lab Results   Component Value Date    LABA1C 9.3 03/05/2021     Lab Results   Component Value Date    TROPONINI <0.01 05/19/2021         Cardiac Data:     EKG: reviewed. Telemetry reviewed, sinus bradycardia with no arrhythmia. Echo: 8/2017 OSH  Study Conclusions     - Left ventricle: The cavity size was normal. Wall thickness was     mildly increased. There was mild concentric hypertrophy. Systolic     function was normal. The calculated ejection fraction was in the   [place] of 59% to 63%. Although no diagnostic regional wall motion     abnormality was identified, this possibility cannot be completely     excluded on the basis of this study. Normal diastolic function.   - Aortic valve: Thickening, consistent with sclerosis. There was     trivial regurgitation.   - Right ventricle: The cavity size was mildly dilated. Wall     thickness was normal.   - Inferior vena cava: Unable to be visualized due to patient body     habitus. Echo: 7/2017  Conclusions      Summary   Technically difficult examination. Consider BREEZY if clinically appropriate. ---Left ventricular systolic function is normal with the ejection fraction   estimated at 55%. No regional wall motion abnormalities. Grade I diastolic   dysfunction with normal filling pressure.  LV wall thickness at upper limits   of normal. Left ventricle size is normal.   --- A bubble study was performed and fails to show evidence of shunting. Stress Test: 6/2020  Summary    ABNORMAL LOW RISK STRESS TEST    Normal LV size and systolic function. Left ventricular ejection fraction of    70%. MIld hypokinesis of the inferolateral wall. THere is a small defect in    the inferolateral wall at stress and rest consistent with possible    myocardial scar. No gross ischemia.               Cardiac catheterization: 6/2020  LEFT HEART CATH  LM: ostial 15%  LAD: mid 20%, distal short myocardial bridge then long segment 40% stenosis.                  Diag 1: prox 75%  LCX: luminals                OM1- prox 15% eccentric  RCA: dominant     LVEDP:9  LVEF: 65%, normal wall motion     PCI of Prox Diag 1  STENT:resolute 2 x 15 post dilated to 2.3mm        Additional studies:   CXR reviewed. Impression and Plan:      1. Chest pain consistent with possible angina in setting #2  2. Uncontrolled hypertension, severe  3. Coronary disease status post prior PCI D1  4. Sinus bradycardia  5. Hyperlipidemia  6. DM, uncontrolled  7. Carotid disease  8. History of CVA  9. Prior smoker      Patient Active Problem List   Diagnosis    Acute hyperglycemia    Essential hypertension    Bilateral malignant neoplasm of breast in female ([address]    Microalbuminuria    Obesity (BMI 30-39. 9)    Slurred speech    Hx of ischemic CVA    Brain metastases ([address]    Carotid stenosis, bilateral:<50%:per US 7/2016    SHARRON (acute kidney injury) ([address]    Lactic acidosis    Frequent falls    Depression/anxiety    Abnormal brain MRI    Acute bilateral low back pain without sciatica    Uncontrolled type 2 diabetes mellitus with microalbuminuria, with long-term current use of insulin (HCC)    Closed fracture of right ankle, with routine healing, subsequent encounter    CKD (chronic kidney disease), stage III    Uncontrolled type 2 diabetes mellitus with diabetic nephropathy, with long-term current use of insulin (HCC)    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 ([address]    Dyslipidemia associated with type 2 diabetes mellitus ([address]    Bipolar disorder ([address]    Cardiomegaly    Cardiomyopathy ([address]    Carpal tunnel syndrome    Chronic systolic heart failure (HCC)    Diffuse cystic mastopathy    Edema    Gallstone pancreatitis    Gout    History of bilateral breast cancer    History of renal cell carcinoma    Cardiomyopathy in other diseases classified elsewhere    Mononeuritis    Myalgia and myositis    Nonspecific abnormal electrocardiogram (ECG) (EKG)    Patient in clinical research study    Peroneal muscular atrophy    Scoliosis (and kyphoscoliosis), idiopathic    SOBOE (shortness of breath on exertion)    Syncope and collapse    Chronic pain disorder    DDD (degenerative disc disease), lumbar    Diabetic polyneuropathy associated with type 2 diabetes mellitus (HCC)    Other secondary scoliosis, lumbosacral region    Thoracic spondylosis without myelopathy    Morbid obesity due to excess calories (HCC)    Age-related nuclear cataract of both eyes    Hypermetropia, bilateral    Hypertensive retinopathy, bilateral    Vitreous degeneration, bilateral    Acute bilateral low back pain with left-sided sciatica    History of CVA (cerebrovascular accident) without residual deficits    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 stage 3 chronic kidney disease (HCC)    S/P mastectomy, right    Acute cystitis without hematuria    Hypomagnesemia    Chest pain    CAD (coronary artery disease)    DKA (diabetic ketoacidoses) ([address]    DM (diabetes mellitus), type 2, uncontrolled with complications ([address]    Hx of heart artery stent    Nuclear senile cataract    Unintentional weight loss    Anemia    Facial abscess    Asymptomatic bacteriuria    Acute encephalopathy    Tobacco use disorder       PLAN:  1. Change lisinopril to 20 mg BID and add chlorthalidone 25 mg daily first dose now   2. Hydralazine PRN for SBP > 160, DBP >100  3. No BB owing to resting bradycardia  4. No evidence of overload  5. Plan for Marie Bullion in AM  6. Will obtain transthoracic echocardiogram for evaluation of underlying cardiac structure and function. 7. Continue aspirin, brilinta, statin     We will follow. I will address the patient's cardiac risk factors and adjusted pharmacologic treatment as needed. In addition, I have reinforced the need for patient directed risk factor modification. All questions and concerns were addressed to the patient/family. Alternatives to my treatment were discussed. Thank you for allowing us to participate in the care of Cinda Santillan. Please call me with any questions [phone].     Ary Castleman, MD, [facility] [place]  Cardiovascular Disease  [address]  [phone] [facility]  [phone] [address] [place] [address]  5/19/2021 3:20 PM

## 2021-05-19 NOTE — PROGRESS NOTES
Department of Internal Medicine  General Internal Medicine   Progress Note    Chief Complaint: Chest pain    SUBJECTIVE:    Pt denies active chest pain. Feels stressed out because her friend's son was murdered on Saturday.      OBJECTIVE      Medications    Current Facility-Administered Medications: aspirin EC tablet 81 mg, 81 mg, Oral, Daily  atorvastatin (LIPITOR) tablet 10 mg, 10 mg, Oral, Nightly  oyster shell calcium/vitamin D 250-125 MG-UNIT per tablet 500 mg, 500 mg, Oral, Daily  clonazePAM (KLONOPIN) tablet 0.5 mg, 0.5 mg, Oral, TID PRN  ferrous sulfate (IRON 325) tablet 325 mg, 325 mg, Oral, Daily with breakfast  gabapentin (NEURONTIN) capsule 600 mg, 600 mg, Oral, BID  insulin glargine (LANTUS) injection vial 30 Units, 30 Units, Subcutaneous, QAM  lisinopril (PRINIVIL;ZESTRIL) tablet 10 mg, 10 mg, Oral, Daily  oxyCODONE-acetaminophen (PERCOCET) 7.5-325 MG per tablet 1 tablet, 1 tablet, Oral, Q6H PRN  pantoprazole (PROTONIX) tablet 40 mg, 40 mg, Oral, Daily  atorvastatin (LIPITOR) tablet 10 mg, 10 mg, Oral, Daily  ticagrelor (BRILINTA) tablet 90 mg, 90 mg, Oral, BID  traZODone (DESYREL) tablet 100 mg, 100 mg, Oral, Nightly  glucose (GLUTOSE) 40 % oral gel 15 g, 15 g, Oral, PRN  dextrose 50 % IV solution, 12.5 g, Intravenous, PRN  glucagon (rDNA) injection 1 mg, 1 mg, Intramuscular, PRN  dextrose 5 % solution, 100 mL/hr, Intravenous, PRN  insulin lispro (HUMALOG) injection vial 0-12 Units, 0-12 Units, Subcutaneous, TID WC  insulin lispro (HUMALOG) injection vial 0-6 Units, 0-6 Units, Subcutaneous, Nightly  sodium chloride flush 0.9 % injection 5-40 mL, 5-40 mL, Intravenous, 2 times per day  sodium chloride flush 0.9 % injection 5-40 mL, 5-40 mL, Intravenous, PRN  0.9 % sodium chloride infusion, 25 mL, Intravenous, PRN  enoxaparin (LOVENOX) injection 40 mg, 40 mg, Subcutaneous, Daily  promethazine (PHENERGAN) tablet 12.5 mg, 12.5 mg, Oral, Q6H PRN **OR** ondansetron (ZOFRAN) injection 4 mg, 4 mg, Intravenous, statin  -Cardiology consulted      Acute hyperglycemia with DM2, uncontrolled A1c 9.3  -Compliance with medication regimen/diet questionable  -Given 2 doses of 10 units regular insulin in ED, blood sugar now down to the 500s, no evidence of DKA  -Continue home Lantus  -Placed on moderate dose SSI, hypoglycemia protocol, POC glucose AC at bedtime  -Continue IV fluids     Mild SHARRON-improved with IV fluids, continue gentle MIVF     History of stroke-resume antiplatelet and statin for secondary prophylaxis     Depression/anxiety-mood stable, resume trazodone and Klonopin as needed     Former tobacco use     HTN-controlled, resume home medication regimen     DVT Prophylaxis: Lovenox  Diet: 4 g carb controlled   code Status: Full code  PT/OT Eval Status: Ambulatory

## 2021-05-19 NOTE — ED NOTES
RN rounded on pt. VS updated and as charted and pt remains on cardiac monitor. Nitro paste placed to R side of chest wall. Pt medicated with insulin as ordered. Warm blankets given. Pt has no further needs at this time. Pt resting in bed, call light within reach. Pt denies any questions.         Sarah Beth Perez RN  05/18/21 8040

## 2021-05-19 NOTE — ED NOTES
Nitro 0.4mg sl #1 given per ACLS protocol.  VS HR 63 /68 CP 3/10       Sara Berumen RN  05/18/21 1269

## 2021-05-19 NOTE — PROGRESS NOTES
Pt arrived from ED to [place] [address] via wheelchair. Admission assessment completed per documentation. Pt A&Ox4. VSS. Pt awake in bed. Pt c/o acute headache 9/10. Bilateral lung sounds diminished. Bed locked and in lowest position. Nonskid slippers on. Side rails up 2/4. Call light and personal belongings within reach. Will continue to monitor.

## 2021-05-19 NOTE — ED NOTES
Critical lab value of glucose of 839 taken from lab. Reported to [facility], NP.       Colleen Sepulveda RN  05/18/21 5635

## 2021-05-19 NOTE — ED NOTES
RN rounded on pt. FSBS with HI reading and provider aware. VBG obtained. Pt medicated with LR and with nausea meds. Pt VS as charted and pt remains on cardiac monitor. Pt has no further needs at this time. Pt resting in bed, call light within reach. Pt denies any questions.         Monty Man RN  05/18/21 [id number]

## 2021-05-19 NOTE — ED NOTES
Pt ambulated to bathroom without difficulty. Urine obtained. Pt placed back in bed, on cardiac monitor, VS as charted. Pt alert and oriented and without distress.      Monty Man RN  05/18/21 2044

## 2021-05-19 NOTE — ED NOTES
Spoke with Dr Joshua Angel concerning blood sugar of greater than 600. See new orders.       Jonathan Walls RN  05/18/21 3175

## 2021-05-20 ENCOUNTER — APPOINTMENT (OUTPATIENT)
Dept: NUCLEAR MEDICINE | Age: 61
DRG: 199 | End: 2021-05-20
Payer: MEDICAID

## 2021-05-20 VITALS
DIASTOLIC BLOOD PRESSURE: 87 MMHG | TEMPERATURE: 98.2 F | OXYGEN SATURATION: 98 % | RESPIRATION RATE: 16 BRPM | HEART RATE: 68 BPM | SYSTOLIC BLOOD PRESSURE: 134 MMHG | WEIGHT: 122.1 LBS | HEIGHT: 63 IN | BODY MASS INDEX: 21.63 KG/M2

## 2021-05-20 LAB
EKG ATRIAL RATE: 59 BPM
EKG DIAGNOSIS: NORMAL
EKG P AXIS: 45 DEGREES
EKG P-R INTERVAL: 160 MS
EKG Q-T INTERVAL: 454 MS
EKG QRS DURATION: 82 MS
EKG QTC CALCULATION (BAZETT): 449 MS
EKG R AXIS: -24 DEGREES
EKG T AXIS: 59 DEGREES
EKG VENTRICULAR RATE: 59 BPM
GLUCOSE BLD-MCNC: 183 MG/DL (ref 70–99)
GLUCOSE BLD-MCNC: 248 MG/DL (ref 70–99)
GLUCOSE BLD-MCNC: 75 MG/DL (ref 70–99)
GLUCOSE BLD-MCNC: 91 MG/DL (ref 70–99)
LV EF: 36 %
LV EF: 53 %
LVEF MODALITY: NORMAL
LVEF MODALITY: NORMAL
PERFORMED ON: ABNORMAL
PERFORMED ON: ABNORMAL
PERFORMED ON: NORMAL
PERFORMED ON: NORMAL

## 2021-05-20 PROCEDURE — 6360000002 HC RX W HCPCS: Performed by: INTERNAL MEDICINE

## 2021-05-20 PROCEDURE — 96375 TX/PRO/DX INJ NEW DRUG ADDON: CPT

## 2021-05-20 PROCEDURE — 93017 CV STRESS TEST TRACING ONLY: CPT

## 2021-05-20 PROCEDURE — 6370000000 HC RX 637 (ALT 250 FOR IP): Performed by: INTERNAL MEDICINE

## 2021-05-20 PROCEDURE — 78452 HT MUSCLE IMAGE SPECT MULT: CPT

## 2021-05-20 PROCEDURE — 93306 TTE W/DOPPLER COMPLETE: CPT

## 2021-05-20 PROCEDURE — 2580000003 HC RX 258: Performed by: INTERNAL MEDICINE

## 2021-05-20 PROCEDURE — G0378 HOSPITAL OBSERVATION PER HR: HCPCS

## 2021-05-20 PROCEDURE — 3430000000 HC RX DIAGNOSTIC RADIOPHARMACEUTICAL: Performed by: INTERNAL MEDICINE

## 2021-05-20 PROCEDURE — 99233 SBSQ HOSP IP/OBS HIGH 50: CPT | Performed by: INTERNAL MEDICINE

## 2021-05-20 PROCEDURE — 93010 ELECTROCARDIOGRAM REPORT: CPT | Performed by: INTERNAL MEDICINE

## 2021-05-20 PROCEDURE — 96361 HYDRATE IV INFUSION ADD-ON: CPT

## 2021-05-20 PROCEDURE — A9502 TC99M TETROFOSMIN: HCPCS | Performed by: INTERNAL MEDICINE

## 2021-05-20 RX ORDER — ATORVASTATIN CALCIUM 20 MG/1
20 TABLET, FILM COATED ORAL DAILY
Qty: 30 TABLET | Refills: 5 | Status: SHIPPED | OUTPATIENT
Start: 2021-05-20 | End: 2021-11-17

## 2021-05-20 RX ORDER — LISINOPRIL 40 MG/1
40 TABLET ORAL DAILY
Qty: 90 TABLET | Refills: 1 | Status: ON HOLD | OUTPATIENT
Start: 2021-05-20 | End: 2021-06-04 | Stop reason: SDUPTHER

## 2021-05-20 RX ORDER — CHLORTHALIDONE 25 MG/1
25 TABLET ORAL DAILY
Qty: 30 TABLET | Refills: 3 | Status: ON HOLD | OUTPATIENT
Start: 2021-05-21 | End: 2021-06-04 | Stop reason: HOSPADM

## 2021-05-20 RX ADMIN — PANTOPRAZOLE SODIUM 40 MG: 40 TABLET, DELAYED RELEASE ORAL at 10:57

## 2021-05-20 RX ADMIN — ATORVASTATIN CALCIUM 10 MG: 10 TABLET, FILM COATED ORAL at 10:56

## 2021-05-20 RX ADMIN — CHLORTHALIDONE 25 MG: 25 TABLET ORAL at 10:57

## 2021-05-20 RX ADMIN — ONDANSETRON 4 MG: 2 INJECTION INTRAMUSCULAR; INTRAVENOUS at 11:04

## 2021-05-20 RX ADMIN — Medication 500 MG: at 10:55

## 2021-05-20 RX ADMIN — SODIUM CHLORIDE, PRESERVATIVE FREE 10 ML: 5 INJECTION INTRAVENOUS at 10:57

## 2021-05-20 RX ADMIN — TICAGRELOR 90 MG: 90 TABLET ORAL at 11:00

## 2021-05-20 RX ADMIN — TETROFOSMIN 10.8 MILLICURIE: 1.38 INJECTION, POWDER, LYOPHILIZED, FOR SOLUTION INTRAVENOUS at 07:59

## 2021-05-20 RX ADMIN — GABAPENTIN 600 MG: 300 CAPSULE ORAL at 10:55

## 2021-05-20 RX ADMIN — ACETAMINOPHEN 650 MG: 325 TABLET ORAL at 02:42

## 2021-05-20 RX ADMIN — FERROUS SULFATE TAB 325 MG (65 MG ELEMENTAL FE) 325 MG: 325 (65 FE) TAB at 10:57

## 2021-05-20 RX ADMIN — ASPIRIN 81 MG: 81 TABLET, COATED ORAL at 10:58

## 2021-05-20 RX ADMIN — LISINOPRIL 20 MG: 20 TABLET ORAL at 10:57

## 2021-05-20 RX ADMIN — TETROFOSMIN 33 MILLICURIE: 1.38 INJECTION, POWDER, LYOPHILIZED, FOR SOLUTION INTRAVENOUS at 09:34

## 2021-05-20 RX ADMIN — REGADENOSON 0.4 MG: 0.08 INJECTION, SOLUTION INTRAVENOUS at 09:34

## 2021-05-20 RX ADMIN — OXYCODONE HYDROCHLORIDE AND ACETAMINOPHEN 1 TABLET: 7.5; 325 TABLET ORAL at 10:55

## 2021-05-20 ASSESSMENT — PAIN SCALES - GENERAL
PAINLEVEL_OUTOF10: 4
PAINLEVEL_OUTOF10: 4
PAINLEVEL_OUTOF10: 0
PAINLEVEL_OUTOF10: 9
PAINLEVEL_OUTOF10: 9

## 2021-05-20 ASSESSMENT — PAIN DESCRIPTION - PAIN TYPE: TYPE: ACUTE PAIN;CHRONIC PAIN

## 2021-05-20 ASSESSMENT — PAIN DESCRIPTION - LOCATION: LOCATION: HEAD;BACK

## 2021-05-20 NOTE — PROGRESS NOTES
CARDIOLOGY PROGRESS NOTE      Patient Name: Mary Brock  Date of admission: 5/18/2021  7:51 PM  Admission Dx: Chest pain [R07.9]  Reason for Consult:  Chest pain   Requesting Physician: Harriett Garcia MD  Primary Care physician: Franck Officer    Subjective:     Mary Brock is a 57 y.o. patient with a prior medical history notable for coronary artery disease with prior PCI to first Diagonal 6/2020, carotid disease, diabetes, hypertension, hyperlipidemia, congestive heart failure, CKD with history of solitary kidney status post nephrectomy for Wilms tumor as child,  who presented to the hospital 5/18/21 with complaints of chest pain. Stress test this AM showed possible anterior wall/LAD territory scar with abnormal EF but may be poor quality gating and breast artifact. She complains of headache. Complains of left orbital pain. Blurry vision L. No asymmetric UE/LE weakness. No paresthesias. No chest pain. No dyspnea or palpitations. Home Medications:  Were reviewed and are listed in nursing record and/or below  Prior to Admission medications    Medication Sig Start Date End Date Taking? Authorizing Provider   lisinopril (PRINIVIL;ZESTRIL) 10 MG tablet TAKE 1 TABLET BY MOUTH EVERY DAY 4/12/21  Yes Naty Spaulding MD   paliperidone (INVEGA) 9 MG extended release tablet Take 9 mg by mouth every morning  3/15/21  Yes Historical Provider, MD   pantoprazole (PROTONIX) 40 MG tablet Take 40 mg by mouth daily  4/3/21  Yes Historical Provider, MD   ferrous sulfate (IRON 325) 325 (65 Fe) MG tablet Take 325 mg by mouth daily (with breakfast)   Yes Historical Provider, MD   simvastatin (ZOCOR) 20 MG tablet TAKE 1 TABLET BY MOUTH EVERYDAY AT BEDTIME 3/22/21  Yes Historical Provider, MD   oxyCODONE-acetaminophen (PERCOCET) 7.5-325 MG per tablet TAKE 1 TABLET BY MOUTH EVERY 6 (SIX) HOURS AS NEEDED FOR UP TO 28 DAYS.  3/11/21  Yes Historical Provider, MD   clonazePAM (KLONOPIN) 0.5 MG tablet Take 0.5 tablet 500 mg, Daily  clonazePAM (KLONOPIN) tablet 0.5 mg, TID PRN  ferrous sulfate (IRON 325) tablet 325 mg, Daily with breakfast  gabapentin (NEURONTIN) capsule 600 mg, BID  insulin glargine (LANTUS) injection vial 30 Units, QAM  oxyCODONE-acetaminophen (PERCOCET) 7.5-325 MG per tablet 1 tablet, Q6H PRN  pantoprazole (PROTONIX) tablet 40 mg, Daily  atorvastatin (LIPITOR) tablet 10 mg, Daily  ticagrelor (BRILINTA) tablet 90 mg, BID  traZODone (DESYREL) tablet 100 mg, Nightly  glucose (GLUTOSE) 40 % oral gel 15 g, PRN  dextrose 50 % IV solution, PRN  glucagon (rDNA) injection 1 mg, PRN  dextrose 5 % solution, PRN  insulin lispro (HUMALOG) injection vial 0-12 Units, TID WC  insulin lispro (HUMALOG) injection vial 0-6 Units, Nightly  sodium chloride flush 0.9 % injection 5-40 mL, 2 times per day  sodium chloride flush 0.9 % injection 5-40 mL, PRN  0.9 % sodium chloride infusion, PRN  enoxaparin (LOVENOX) injection 40 mg, Daily  promethazine (PHENERGAN) tablet 12.5 mg, Q6H PRN   Or  ondansetron (ZOFRAN) injection 4 mg, Q6H PRN  polyethylene glycol (GLYCOLAX) packet 17 g, Daily PRN  acetaminophen (TYLENOL) tablet 650 mg, Q6H PRN   Or  acetaminophen (TYLENOL) suppository 650 mg, Q6H PRN  nitroGLYCERIN (NITROSTAT) SL tablet 0.4 mg, Q5 Min PRN  perflutren lipid microspheres (DEFINITY) injection 1.65 mg, ONCE PRN  lisinopril (PRINIVIL;ZESTRIL) tablet 20 mg, BID  chlorthalidone (HYGROTON) tablet 25 mg, Daily  hydrALAZINE (APRESOLINE) injection 10 mg, Q6H PRN  nitroGLYCERIN (NITROSTAT) SL tablet 0.4 mg, Q5 Min PRN        Allergies:  Morphine, Penicillins, Codeine, and Penicillin g     Review of Systems:   A 14 point review of symptoms completed. Pertinent positives identified in the HPI, all other review of symptoms negative.        Objective:     Vitals:    05/19/21 1930 05/20/21 0000 05/20/21 0530 05/20/21 1045   BP: (!) 152/80 133/75 138/70 (!) 170/90   Pulse: 55 65 51 64   Resp: 16 16 18 16   Temp: 98 °F (36.7 °C) 98.2 °F (36.8 °C) 98.7 °F (37.1 °C) 98.2 °F (36.8 °C)   TempSrc: Oral Oral Oral Oral   SpO2: 100% 98% 98% 100%   Weight:       Height:          Weight: 122 lb 1.6 oz (55.4 kg)       PHYSICAL EXAM:    General:  Mild distress owing to headache   Head:  Normocephalic, atraumatic   Eyes:  Conjunctiva/corneas clear, anicteric sclerae    Nose: Nares normal, no drainage or sinus tenderness   Throat: No abnormalities of the lips, oral mucosa or tongue. Neck: Trachea midline. Neck supple with no lymphadenopathy, thyroid not enlarged, symmetric, no tenderness/mass/nodules, no Jugular venous pressure elevation    Lungs:   Clear to auscultation bilaterally, no wheezes, no rales, no respiratory distress   Chest Wall:  No deformity or tenderness to palpation   Heart:  Regular rate and rhythm, normal S1, normal S2, no murmur, no rub, no S3/S4, PMI non-displaced. Abdomen:   Soft, non-tender, with normoactive bowel sounds. No masses, no hepatosplenomegaly   Extremities: No cyanosis, clubbing or pitting edema. Vascular: 2+ radial, dorsalis pedis and posterior tibial pulses bilaterally. Brisk carotid upstrokes without carotid bruit. Skin: Skin color, texture, turgor are normal with no rashes or ulceration. Pysch: Euthymic mood, appropriate affect   Neurologic: Oriented to person, place and time. No slurred speech or facial asymmetry. No motor or sensory deficits on gross examination.           Labs:   CBC:   Lab Results   Component Value Date    WBC 4.6 05/18/2021    RBC 3.84 05/18/2021    HGB 10.8 05/18/2021    HCT 34.8 05/18/2021    MCV 90.6 05/18/2021    RDW 13.5 05/18/2021     05/18/2021     CMP:  Lab Results   Component Value Date     05/19/2021    K 3.7 05/19/2021     05/19/2021    CO2 30 05/19/2021    BUN 10 05/19/2021    CREATININE 1.0 05/19/2021    GFRAA >60 05/19/2021    GFRAA >60 05/29/2013    AGRATIO 0.9 05/18/2021    LABGLOM 56 05/19/2021    GLUCOSE 255 05/19/2021    PROT 7.6 05/18/2021    PROT 8.1 01/05/2013    CALCIUM 9.4 05/19/2021    BILITOT <0.2 05/18/2021    ALKPHOS 198 05/18/2021    AST 26 05/18/2021    ALT 34 05/18/2021     PT/INR:  No results found for: PTINR  HgBA1c:  Lab Results   Component Value Date    LABA1C 9.3 03/05/2021     Lab Results   Component Value Date    TROPONINI <0.01 05/19/2021         Interval Testing/Data:   Telemetry reviewed. Sinus bradycardia with no events. Echo 5/18/21  Summary   The left ventricular systolic function is low normal with a visually   estimated ejection fraction of 50-55%. No obvious regional wall motion abnormalities are seen. Normal left ventricular size with mild concentric left ventricular   hypertrophy. Grade I diastolic dysfunction with normal filling pressure. The right ventricle is normal in size and function. Mild mitral and pulmonic regurgitation. Lexiscan:  Conclusions        Summary    Moderate severity, fixed, mid to apical anterior perfusion defect with    reduced myocardial wall motion/thickening suggestive of prior myocardial    infarction. No inducible ischemia. Normal LV size by volumes. Post-stress LV    function is reduced at 36% with serafin-apical hypokinesis/possible apical    dyskinesis. Abnormal high risk study. Suggest echocardiogram to corroborate    findings.               Cardiac catheterization: 6/2020  LEFT HEART CATH  LM: ostial 15%  LAD: mid 20%, distal short myocardial bridge then long segment 40% stenosis.                  Diag 1: prox 75%  LCX: luminals                OM1- prox 15% eccentric  RCA: dominant     LVEDP:9  LVEF: 65%, normal wall motion     PCI of Prox Diag 1  STENT:resolute 2 x 15 post dilated to 2.3mm    Impression and Plan      1. Chest pain consistent with possible angina in setting #2, abnormal stress test suggested possible LAD territory scar but echo appears normal and this is likely breast artifact/false positive. Her CP is likely related to underlying severe hypertension.    2. Hypertension, near goal  3. Coronary disease status post prior PCI D1  4. Sinus bradycardia  5. Hyperlipidemia  6. DM, uncontrolled  7. Carotid disease  8. History of CVA  9. Prior smoker       Patient Active Problem List   Diagnosis    Acute hyperglycemia    Essential hypertension    Bilateral malignant neoplasm of breast in female ([address]    Microalbuminuria    Obesity (BMI 30-39. 9)    Slurred speech    Hx of ischemic CVA    Brain metastases ([address]    Carotid stenosis, bilateral:<50%:per US 7/2016    SHARRON (acute kidney injury) ([address]    Lactic acidosis    Frequent falls    Depression/anxiety    Abnormal brain MRI    Acute bilateral low back pain without sciatica    Uncontrolled type 2 diabetes mellitus with microalbuminuria, with long-term current use of insulin (HCC)    Closed fracture of right ankle, with routine healing, subsequent encounter    CKD (chronic kidney disease), stage III    Uncontrolled type 2 diabetes mellitus with diabetic nephropathy, with long-term current use of insulin (HCC)    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 ([address]    Dyslipidemia associated with type 2 diabetes mellitus ([address]    Bipolar disorder ([address]    Cardiomegaly    Cardiomyopathy ([address]    Carpal tunnel syndrome    Chronic systolic heart failure (HCC)    Diffuse cystic mastopathy    Edema    Gallstone pancreatitis    Gout    History of bilateral breast cancer    History of renal cell carcinoma    Cardiomyopathy in other diseases classified elsewhere    Mononeuritis    Myalgia and myositis    Nonspecific abnormal electrocardiogram (ECG) (EKG)    Patient in clinical research study    Peroneal muscular atrophy    Scoliosis (and kyphoscoliosis), idiopathic    SOBOE (shortness of breath on exertion)    Syncope and collapse    Chronic pain disorder    DDD (degenerative disc disease), lumbar    Diabetic polyneuropathy associated with type 2 diabetes mellitus ([address]    Other secondary scoliosis, lumbosacral region    Thoracic spondylosis without myelopathy    Morbid obesity due to excess calories (HCC)    Age-related nuclear cataract of both eyes    Hypermetropia, bilateral    Hypertensive retinopathy, bilateral    Vitreous degeneration, bilateral    Acute bilateral low back pain with left-sided sciatica    History of CVA (cerebrovascular accident) without residual deficits    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 stage 3 chronic kidney disease (HCC)    S/P mastectomy, right    Acute cystitis without hematuria    Hypomagnesemia    Chest pain    CAD (coronary artery disease)    D[facility]diabetic ketoacidoses) [facility]    DM (diabetes mellitus), type 2, uncontrolled with complications ([facility]    Hx of heart artery stent    Nuclear senile cataract    Unintentional weight loss    Anemia    Facial abscess    Asymptomatic bacteriuria    Acute encephalopathy    Tobacco use disorder         PLAN:  1. Continue lisinopril to 20 mg BID and plan to consolidate to 40mg daily tomorrow on OP basis. Continue newly added chlorthalidone 25 mg daily - switch to AM dosing. 2. Continue aspirin, brilinta, statin   3. No BB owing to resting bradycardia     No further cardiac testing planned. BP's improved, near goal <130/80. Consider 3rd agent on OP basis. DC home appropriate today from cardiac standpoint. I will address the patient's cardiac risk factors and adjusted pharmacologic treatment as needed. In addition, I have reinforced the need for patient directed risk factor modification. All questions and concerns were addressed to the patient/family. Alternatives to my treatment were discussed. Thank you for allowing us to participate in the care of Mehnaz Villarreal. Please call me with any questions [phone].     Honey Brar MD, [facility]  Cardiovascular Disease  [address]  [phone] [facility]  [phone] JOHN [facility] Office  5/20/2021 1:03 PM

## 2021-05-20 NOTE — PROGRESS NOTES
Department of Internal Medicine  General Internal Medicine   Progress Note    Chief Complaint: Chest pain    SUBJECTIVE:    Pt denies active chest pain. Stress test this AM showed possible anterior wall/LAD territory scar with abnormal EF. Having some HA.      OBJECTIVE      Medications    Current Facility-Administered Medications: aspirin EC tablet 81 mg, 81 mg, Oral, Daily  atorvastatin (LIPITOR) tablet 10 mg, 10 mg, Oral, Nightly  oyster shell calcium/vitamin D 250-125 MG-UNIT per tablet 500 mg, 500 mg, Oral, Daily  clonazePAM (KLONOPIN) tablet 0.5 mg, 0.5 mg, Oral, TID PRN  ferrous sulfate (IRON 325) tablet 325 mg, 325 mg, Oral, Daily with breakfast  gabapentin (NEURONTIN) capsule 600 mg, 600 mg, Oral, BID  insulin glargine (LANTUS) injection vial 30 Units, 30 Units, Subcutaneous, QAM  oxyCODONE-acetaminophen (PERCOCET) 7.5-325 MG per tablet 1 tablet, 1 tablet, Oral, Q6H PRN  pantoprazole (PROTONIX) tablet 40 mg, 40 mg, Oral, Daily  ticagrelor (BRILINTA) tablet 90 mg, 90 mg, Oral, BID  traZODone (DESYREL) tablet 100 mg, 100 mg, Oral, Nightly  glucose (GLUTOSE) 40 % oral gel 15 g, 15 g, Oral, PRN  dextrose 50 % IV solution, 12.5 g, Intravenous, PRN  glucagon (rDNA) injection 1 mg, 1 mg, Intramuscular, PRN  dextrose 5 % solution, 100 mL/hr, Intravenous, PRN  insulin lispro (HUMALOG) injection vial 0-12 Units, 0-12 Units, Subcutaneous, TID WC  insulin lispro (HUMALOG) injection vial 0-6 Units, 0-6 Units, Subcutaneous, Nightly  sodium chloride flush 0.9 % injection 5-40 mL, 5-40 mL, Intravenous, 2 times per day  sodium chloride flush 0.9 % injection 5-40 mL, 5-40 mL, Intravenous, PRN  0.9 % sodium chloride infusion, 25 mL, Intravenous, PRN  enoxaparin (LOVENOX) injection 40 mg, 40 mg, Subcutaneous, Daily  promethazine (PHENERGAN) tablet 12.5 mg, 12.5 mg, Oral, Q6H PRN **OR** ondansetron (ZOFRAN) injection 4 mg, 4 mg, Intravenous, Q6H PRN  polyethylene glycol (GLYCOLAX) packet 17 g, 17 g, Oral, Daily PRN  acetaminophen (TYLENOL) tablet 650 mg, 650 mg, Oral, Q6H PRN **OR** acetaminophen (TYLENOL) suppository 650 mg, 650 mg, Rectal, Q6H PRN  nitroGLYCERIN (NITROSTAT) SL tablet 0.4 mg, 0.4 mg, Sublingual, Q5 Min PRN  perflutren lipid microspheres (DEFINITY) injection 1.65 mg, 1.5 mL, Intravenous, ONCE PRN  lisinopril (PRINIVIL;ZESTRIL) tablet 20 mg, 20 mg, Oral, BID  chlorthalidone (HYGROTON) tablet 25 mg, 25 mg, Oral, Daily  hydrALAZINE (APRESOLINE) injection 10 mg, 10 mg, Intravenous, Q6H PRN  nitroGLYCERIN (NITROSTAT) SL tablet 0.4 mg, 0.4 mg, Sublingual, Q5 Min PRN  Physical    VITALS:  /87   Pulse 68   Temp 98.2 °F (36.8 °C) (Oral)   Resp 16   Ht 5' 3\" (1.6 m)   Wt 122 lb 1.6 oz (55.4 kg)   SpO2 98%   BMI 21.63 kg/m²   General appearance:  No apparent distress, appears stated age and cooperative. HEENT:  Normal cephalic, atraumatic without obvious deformity. Pupils equal, round, and reactive to light. Extra ocular muscles intact. Conjunctivae/corneas clear. Neck: Supple, with full range of motion. No jugular venous distention. Trachea midline. Respiratory:  Normal respiratory effort. Clear to auscultation, bilaterally without Rales/Wheezes/Rhonchi. Cardiovascular:  Regular rate and rhythm with normal S1/S2 without murmurs, rubs or gallops. Abdomen: Soft, non-tender, non-distended with normal bowel sounds. Musculoskeletal:  No clubbing, cyanosis or edema bilaterally. Full range of motion without deformity. Skin: Skin color, texture, turgor normal.  No rashes or lesions. Neurologic:  Neurovascularly intact without any focal sensory/motor deficits. Cranial nerves: II-XII intact, grossly non-focal.  Psychiatric:  Alert and oriented, thought content appropriate, normal insight  Capillary Refill: Brisk,3 seconds, normal  Peripheral Pulses: +2 palpable, equal bilaterally     Data    CXR:  Stable chronic changes with no acute abnormality seen.     ASSESSMENT AND PLAN      Chest pain  CAD s/p PCI to diagonal in June 2020  -Stress test this AM showed possible anterior wall/LAD territory scar with abnormal EF.  -Continue lisinopril to 20 mg BID and plan to consolidate to 40mg daily tomorrow.  Continue newly added chlorthalidone 25 mg daily  -Cont Brilinta, aspirin, and statin  -No BB owing to resting bradycardia  -Cardiology following      Acute hyperglycemia with DM2, uncontrolled A1c 9.3  -Compliance with medication regimen/diet questionable  -Continue home Lantus  -Placed on moderate dose SSI, hypoglycemia protocol, POC glucose AC at bedtime  -Continue IV fluids     Mild SHARRON-improved with IV fluids      History of stroke-resume antiplatelet and statin for secondary prophylaxis     Depression/anxiety-mood stable, resume trazodone and Klonopin as needed     Former tobacco use     HTN-controlled, resume home medication regimen     DVT Prophylaxis: Lovenox  Diet: 4 g carb controlled   code Status: Full code  PT/OT Eval Status: Ambulatory

## 2021-05-20 NOTE — PROGRESS NOTES
Pt d/c'd 5/20/21. IV access removed with no complications. Notified [facility] and removed tele box. Reviewed d/c instructions, home meds, and f/u information utilizing teach-back method. Scripts for atorvastatin, chlorthalidone, lisinopril called to patient's preferred pharmacy. Patient verbalized understanding. Patient assisted to lobby in wheelchair and left with all documented belongings.

## 2021-05-20 NOTE — PROGRESS NOTES
A Erika Myoview stress test was completed on this patient as ordered. The patient tolerated the procedure well. Awaiting stress imaging at this time.

## 2021-05-21 NOTE — PROGRESS NOTES
Physician Progress Note      Baltazar Xavier  CSN #:                  [id number]  :                       1960  ADMIT DATE:       2021 7:51 PM  [address] [place] DATE:        2021 6:15 PM  RESPONDING  PROVIDER #:        Suzanne Gaitan MD          QUERY TEXT:    Pt admitted with chest pain. Noted documentation of \"Her CP is likely related   to underlying severe hypertension. \" by Cardiology consultant on 2021. If possible, please document in progress notes and discharge summary:    The medical record reflects the following:  Risk Factors: HTN, CAD  Clinical Indicators: Cardiology states, \"Chest pain consistent with possible   angina in setting #2, abnormal stress test suggested possible LAD territory   scar but echo appears normal and this is likely breast artifact/false   positive\", -[phone]  Treatment: stress test, treat hypertension, Cardiology consult, supportive   care    Thank you,  Kae Wild RN, CDS  [phone]  Options provided:  -- Chest pain due to severe hypertension confirmed present on admission  -- Chest pain due to severe HTN ruled out  -- Other - I will add my own diagnosis  -- Disagree - Not applicable / Not valid  -- Disagree - Clinically unable to determine / Unknown  -- Refer to Clinical Documentation Reviewer    PROVIDER RESPONSE TEXT:    The diagnosis of chest pain due to severe hypertension was confirmed as   present on admission.     Query created by: Bart Petty on 2021 4:35 PM      Electronically signed by:  Suzanne Gaitan MD 2021 10:08 AM

## 2021-05-25 NOTE — DISCHARGE SUMMARY
Physician Discharge Summary     Patient ID:  Gregorio Bocanegra  [id number]  89 y.o.  1960    Admit date: 5/18/2021    Discharge date and time: 5/20/2021  6:15 PM     Admitting Physician: Oanh Olivares MD     Discharge Physician: Sheralyn Bumpers, MD    Admission Diagnoses: Chest pain [R07.9]    Discharge Diagnoses: Poorly controlled HTN    Admission Condition: fair    Discharged Condition: good    Indication for Admission: chest pain     Hospital Course:   Noam Alfonso a 57 y. o. patient with a prior medical history notable for coronary artery disease with prior PCI to first [facility] 6/2020, carotid disease, diabetes, hypertension, hyperlipidemia, congestive heart failure, CKD with history of solitary kidney status post nephrectomy for Wilms tumor as child,  who presented to the hospital 5/18/21 with complaints of chest pain. Seen by cardiology. Stress test showed possible anterior wall/LAD territory scar with abnormal EF but may be poor quality gating and breast artifact. Need better Bp control. D/c on lisinopril 40mg daily and chlorthalidone 25 mg daily. Continued on aspirin, brilinta, and statin. No BB owing to resting bradycardia. No further cardiac testing planned. BP's improved, near goal <130/80. Consider 3rd agent on OP basis.      Consults: cardiology    Discharge Exam:  /87   Pulse 68   Temp 98.2 °F (36.8 °C) (Oral)   Resp 16   Ht 5' 3\" (1.6 m)   Wt 122 lb 1.6 oz (55.4 kg)   SpO2 98%   BMI 21.63 kg/m²     General Appearance:    Alert, cooperative, no distress, appears stated age   Head:    Normocephalic, without obvious abnormality, atraumatic   Eyes:    PERRL, conjunctiva/corneas clear, EOM's intact, fundi     benign, both eyes   Ears:    Normal TM's and external ear canals, both ears   Nose:   Nares normal, septum midline, mucosa normal, no drainage    or sinus tenderness   Throat:   Lips, mucosa, and tongue normal; teeth and gums normal   Neck:   Supple, symmetrical, trachea midline, no adenopathy;     thyroid:  no enlargement/tenderness/nodules; no carotid    bruit or JVD   Back:     Symmetric, no curvature, ROM normal, no CVA tenderness   Lungs:     Clear to auscultation bilaterally, respirations unlabored   Chest Wall:    No tenderness or deformity    Heart:    Regular rate and rhythm, S1 and S2 normal, no murmur, rub   or gallop   Breast Exam:    No tenderness, masses, or nipple abnormality   Abdomen:     Soft, non-tender, bowel sounds active all four quadrants,     no masses, no organomegaly   Genitalia:    Normal female without lesion, discharge or tenderness   Rectal:    Normal tone ;guaiac negative stool   Extremities:   Extremities normal, atraumatic, no cyanosis or edema   Pulses:   2+ and symmetric all extremities   Skin:   Skin color, texture, turgor normal, no rashes or lesions   Lymph nodes:   Cervical, supraclavicular, and axillary nodes normal   Neurologic:   CNII-XII intact, normal strength, sensation and reflexes     throughout       Disposition: home    In process/preliminary results:  Outstanding Order Results     No orders found from 4/19/2021 to 5/19/2021.           Patient Instructions:   Discharge Medication List as of 5/20/2021  4:21 PM      START taking these medications    Details   chlorthalidone (HYGROTON) 25 MG tablet Take 1 tablet by mouth daily, Disp-30 tablet, R-3Normal         CONTINUE these medications which have CHANGED    Details   lisinopril (PRINIVIL;ZESTRIL) 40 MG tablet Take 1 tablet by mouth daily, Disp-90 tablet, R-1Normal      atorvastatin (LIPITOR) 20 MG tablet Take 1 tablet by mouth daily, Disp-30 tablet, R-5Normal         CONTINUE these medications which have NOT CHANGED    Details   paliperidone (INVEGA) 9 MG extended release tablet Take 9 mg by mouth every morning Historical Med      pantoprazole (PROTONIX) 40 MG tablet Take 40 mg by mouth daily Historical Med      ferrous sulfate (IRON 325) 325 (65 Fe) MG tablet Take 325 mg by mouth daily (with breakfast)Historical Med      oxyCODONE-acetaminophen (PERCOCET) 7.5-325 MG per tablet TAKE 1 TABLET BY MOUTH EVERY 6 (SIX) HOURS AS NEEDED FOR UP TO 28 DAYS. Historical Med      clonazePAM (KLONOPIN) 0.5 MG tablet Take 0.5 mg by mouth 3 times daily as needed. Historical Med      ticagrelor (BRILINTA) 90 MG TABS tablet Take 1 tablet by mouth 2 times daily, Disp-60 tablet, R-1Print      TRUE METRIX BLOOD GLUCOSE TEST strip USE AS DIRECTED TO TEST 4 TIMES A DAY, Disp-100 strip, R-5Normal      insulin glargine (LANTUS SOLOSTAR) 100 UNIT/ML injection pen Inject 25 Units into the skin every morning, Disp-1 pen,R-1Normal      nitroGLYCERIN (NITROSTAT) 0.4 MG SL tablet up to max of 3 total doses. If no relief after 1 dose, call 911., Disp-25 tablet,R-3Normal      Insulin Pen Needle (UNIFINE PENTIPS) 32G X 4 MM MISC Disp-100 each, R-6, NormalUSE AS DIRECTED 3 TIMES A DAY      cetirizine (ZYRTEC) 10 MG tablet Take 10 mg by mouth dailyHistorical Med      Blood Pressure Monitor GINA Disp-1 Device, R-0, NormalCheck BP at home once or twice a day      Insulin Syringe-Needle U-100 31G X 5/16\" 0.3 ML MISC Disp-90 each, R-7, NormalUSE 3 TIMES A DAY BEFORE MEALS      calcium carbonate-vitamin D (CALTRATE) 600-400 MG-UNIT TABS per tab Take 1 tablet by mouth daily Historical Med      glucagon 1 MG injection Inject 1 mg into the muscle See Admin Instructions Follow package directions for low blood sugar., Disp-1 kit, R-0Normal      aspirin 81 MG tablet Take 81 mg by mouth dailyHistorical Med      gabapentin (NEURONTIN) 600 MG tablet Take 600 mg by mouth 3 times dailyHistorical Med      traZODone (DESYREL) 100 MG tablet Take 100 mg by mouth nightly         STOP taking these medications       simvastatin (ZOCOR) 20 MG tablet Comments:   Reason for Stopping:             Activity: activity as tolerated  Diet: cardiac diet  Wound Care: none needed    Follow-up with pcp in 2 weeks.     Signed:  Lowell Jeter MD  Time spent > 35 mins 5/24/2021  11:06 PM

## 2021-06-01 RX ORDER — INSULIN GLARGINE 100 [IU]/ML
INJECTION, SOLUTION SUBCUTANEOUS
Qty: 5 PEN | Refills: 3 | OUTPATIENT
Start: 2021-06-01

## 2021-06-02 ENCOUNTER — HOSPITAL ENCOUNTER (OUTPATIENT)
Age: 61
Setting detail: OBSERVATION
Discharge: HOME | End: 2021-06-04
Attending: EMERGENCY MEDICINE | Admitting: INTERNAL MEDICINE
Payer: MEDICAID

## 2021-06-02 ENCOUNTER — APPOINTMENT (OUTPATIENT)
Dept: CT IMAGING | Age: 61
End: 2021-06-02
Payer: MEDICAID

## 2021-06-02 ENCOUNTER — APPOINTMENT (OUTPATIENT)
Dept: GENERAL RADIOLOGY | Age: 61
End: 2021-06-02
Payer: MEDICAID

## 2021-06-02 DIAGNOSIS — N17.9 AKI (ACUTE KIDNEY INJURY) (HCC): Primary | ICD-10-CM

## 2021-06-02 DIAGNOSIS — G93.40 ENCEPHALOPATHY: ICD-10-CM

## 2021-06-02 DIAGNOSIS — R73.9 HYPERGLYCEMIA: ICD-10-CM

## 2021-06-02 DIAGNOSIS — R74.01 ELEVATED TRANSAMINASE LEVEL: ICD-10-CM

## 2021-06-02 DIAGNOSIS — E87.20 LACTIC ACIDOSIS: ICD-10-CM

## 2021-06-02 LAB
A/G RATIO: 0.9 (ref 1.1–2.2)
ALBUMIN SERPL-MCNC: 4.2 G/DL (ref 3.4–5)
ALP BLD-CCNC: 239 U/L (ref 40–129)
ALT SERPL-CCNC: 70 U/L (ref 10–40)
AMPHETAMINE SCREEN, URINE: ABNORMAL
ANION GAP SERPL CALCULATED.3IONS-SCNC: 12 MMOL/L (ref 3–16)
AST SERPL-CCNC: 49 U/L (ref 15–37)
BACTERIA: ABNORMAL /HPF
BARBITURATE SCREEN URINE: ABNORMAL
BASE EXCESS VENOUS: 5.5 MMOL/L (ref -3–3)
BASOPHILS ABSOLUTE: 0 K/UL (ref 0–0.2)
BASOPHILS RELATIVE PERCENT: 0.3 %
BENZODIAZEPINE SCREEN, URINE: ABNORMAL
BILIRUB SERPL-MCNC: 0.3 MG/DL (ref 0–1)
BILIRUBIN URINE: NEGATIVE
BLOOD, URINE: ABNORMAL
BUN BLDV-MCNC: 21 MG/DL (ref 7–20)
CALCIUM SERPL-MCNC: 10.4 MG/DL (ref 8.3–10.6)
CANNABINOID SCREEN URINE: POSITIVE
CARBOXYHEMOGLOBIN: 1.6 % (ref 0–1.5)
CHLORIDE BLD-SCNC: 90 MMOL/L (ref 99–110)
CLARITY: CLEAR
CO2: 30 MMOL/L (ref 21–32)
COCAINE METABOLITE SCREEN URINE: ABNORMAL
COLOR: ABNORMAL
CREAT SERPL-MCNC: 1.6 MG/DL (ref 0.6–1.2)
CREATININE URINE: 63.8 MG/DL (ref 28–259)
EOSINOPHILS ABSOLUTE: 0 K/UL (ref 0–0.6)
EOSINOPHILS RELATIVE PERCENT: 0.2 %
GFR AFRICAN AMERICAN: 39
GFR NON-AFRICAN AMERICAN: 33
GLOBULIN: 4.8 G/DL
GLUCOSE BLD-MCNC: 412 MG/DL (ref 70–99)
GLUCOSE BLD-MCNC: 413 MG/DL (ref 70–99)
GLUCOSE BLD-MCNC: 458 MG/DL (ref 70–99)
GLUCOSE URINE: >=1000 MG/DL
HCO3 VENOUS: 32.3 MMOL/L (ref 23–29)
HCT VFR BLD CALC: 35.2 % (ref 36–48)
HEMOGLOBIN: 11.5 G/DL (ref 12–16)
KETONES, URINE: NEGATIVE MG/DL
LACTIC ACID, SEPSIS: 3.2 MMOL/L (ref 0.4–1.9)
LEUKOCYTE ESTERASE, URINE: NEGATIVE
LYMPHOCYTES ABSOLUTE: 1.7 K/UL (ref 1–5.1)
LYMPHOCYTES RELATIVE PERCENT: 17.9 %
Lab: ABNORMAL
MCH RBC QN AUTO: 27.8 PG (ref 26–34)
MCHC RBC AUTO-ENTMCNC: 32.6 G/DL (ref 31–36)
MCV RBC AUTO: 85.4 FL (ref 80–100)
METHADONE SCREEN, URINE: ABNORMAL
METHEMOGLOBIN VENOUS: 0.8 %
MICROSCOPIC EXAMINATION: YES
MONOCYTES ABSOLUTE: 0.4 K/UL (ref 0–1.3)
MONOCYTES RELATIVE PERCENT: 4.4 %
NEUTROPHILS ABSOLUTE: 7.5 K/UL (ref 1.7–7.7)
NEUTROPHILS RELATIVE PERCENT: 77.2 %
NITRITE, URINE: NEGATIVE
O2 CONTENT, VEN: 6 VOL %
O2 SAT, VEN: 37 %
O2 THERAPY: ABNORMAL
OPIATE SCREEN URINE: ABNORMAL
OXYCODONE URINE: POSITIVE
PCO2, VEN: 57.7 MMHG (ref 40–50)
PDW BLD-RTO: 13 % (ref 12.4–15.4)
PERFORMED ON: ABNORMAL
PERFORMED ON: ABNORMAL
PH UA: 6.5
PH UA: 6.5 (ref 5–8)
PH VENOUS: 7.37 (ref 7.35–7.45)
PHENCYCLIDINE SCREEN URINE: ABNORMAL
PLATELET # BLD: 161 K/UL (ref 135–450)
PMV BLD AUTO: 9.6 FL (ref 5–10.5)
PO2, VEN: 21.2 MMHG (ref 25–40)
POTASSIUM REFLEX MAGNESIUM: 3.6 MMOL/L (ref 3.5–5.1)
PROPOXYPHENE SCREEN: ABNORMAL
PROTEIN UA: NEGATIVE MG/DL
RBC # BLD: 4.12 M/UL (ref 4–5.2)
RBC UA: ABNORMAL /HPF (ref 0–4)
SODIUM BLD-SCNC: 132 MMOL/L (ref 136–145)
SODIUM URINE: 89 MMOL/L
SPECIFIC GRAVITY UA: 1.01 (ref 1–1.03)
TCO2 CALC VENOUS: 34 MMOL/L
TOTAL PROTEIN: 9 G/DL (ref 6.4–8.2)
TROPONIN: <0.01 NG/ML
URINE TYPE: ABNORMAL
UROBILINOGEN, URINE: 0.2 E.U./DL
WBC # BLD: 9.7 K/UL (ref 4–11)
WBC UA: ABNORMAL /HPF (ref 0–5)

## 2021-06-02 PROCEDURE — 70450 CT HEAD/BRAIN W/O DYE: CPT

## 2021-06-02 PROCEDURE — 84484 ASSAY OF TROPONIN QUANT: CPT

## 2021-06-02 PROCEDURE — 36415 COLL VENOUS BLD VENIPUNCTURE: CPT

## 2021-06-02 PROCEDURE — 81001 URINALYSIS AUTO W/SCOPE: CPT

## 2021-06-02 PROCEDURE — 83605 ASSAY OF LACTIC ACID: CPT

## 2021-06-02 PROCEDURE — 83935 ASSAY OF URINE OSMOLALITY: CPT

## 2021-06-02 PROCEDURE — 2580000003 HC RX 258: Performed by: NURSE PRACTITIONER

## 2021-06-02 PROCEDURE — 74176 CT ABD & PELVIS W/O CONTRAST: CPT

## 2021-06-02 PROCEDURE — 83930 ASSAY OF BLOOD OSMOLALITY: CPT

## 2021-06-02 PROCEDURE — G0378 HOSPITAL OBSERVATION PER HR: HCPCS

## 2021-06-02 PROCEDURE — 82570 ASSAY OF URINE CREATININE: CPT

## 2021-06-02 PROCEDURE — 84540 ASSAY OF URINE/UREA-N: CPT

## 2021-06-02 PROCEDURE — 80053 COMPREHEN METABOLIC PANEL: CPT

## 2021-06-02 PROCEDURE — 82803 BLOOD GASES ANY COMBINATION: CPT

## 2021-06-02 PROCEDURE — 96361 HYDRATE IV INFUSION ADD-ON: CPT

## 2021-06-02 PROCEDURE — 71045 X-RAY EXAM CHEST 1 VIEW: CPT

## 2021-06-02 PROCEDURE — 99284 EMERGENCY DEPT VISIT MOD MDM: CPT

## 2021-06-02 PROCEDURE — 84300 ASSAY OF URINE SODIUM: CPT

## 2021-06-02 PROCEDURE — 93005 ELECTROCARDIOGRAM TRACING: CPT | Performed by: NURSE PRACTITIONER

## 2021-06-02 PROCEDURE — 85025 COMPLETE CBC W/AUTO DIFF WBC: CPT

## 2021-06-02 PROCEDURE — 80307 DRUG TEST PRSMV CHEM ANLYZR: CPT

## 2021-06-02 RX ORDER — ONDANSETRON 2 MG/ML
4 INJECTION INTRAMUSCULAR; INTRAVENOUS EVERY 6 HOURS PRN
Status: DISCONTINUED | OUTPATIENT
Start: 2021-06-02 | End: 2021-06-04 | Stop reason: HOSPADM

## 2021-06-02 RX ORDER — DEXTROSE MONOHYDRATE 50 MG/ML
100 INJECTION, SOLUTION INTRAVENOUS PRN
Status: DISCONTINUED | OUTPATIENT
Start: 2021-06-02 | End: 2021-06-04 | Stop reason: HOSPADM

## 2021-06-02 RX ORDER — POTASSIUM CHLORIDE 7.45 MG/ML
10 INJECTION INTRAVENOUS PRN
Status: DISCONTINUED | OUTPATIENT
Start: 2021-06-02 | End: 2021-06-04 | Stop reason: HOSPADM

## 2021-06-02 RX ORDER — ACETAMINOPHEN 325 MG/1
650 TABLET ORAL EVERY 6 HOURS PRN
Status: DISCONTINUED | OUTPATIENT
Start: 2021-06-02 | End: 2021-06-04 | Stop reason: HOSPADM

## 2021-06-02 RX ORDER — DEXTROSE MONOHYDRATE 25 G/50ML
12.5 INJECTION, SOLUTION INTRAVENOUS PRN
Status: DISCONTINUED | OUTPATIENT
Start: 2021-06-02 | End: 2021-06-04 | Stop reason: HOSPADM

## 2021-06-02 RX ORDER — SODIUM CHLORIDE 9 MG/ML
1000 INJECTION, SOLUTION INTRAVENOUS CONTINUOUS
Status: DISCONTINUED | OUTPATIENT
Start: 2021-06-02 | End: 2021-06-04 | Stop reason: HOSPADM

## 2021-06-02 RX ORDER — 0.9 % SODIUM CHLORIDE 0.9 %
1000 INTRAVENOUS SOLUTION INTRAVENOUS ONCE
Status: COMPLETED | OUTPATIENT
Start: 2021-06-02 | End: 2021-06-02

## 2021-06-02 RX ORDER — ACETAMINOPHEN 650 MG/1
650 SUPPOSITORY RECTAL EVERY 6 HOURS PRN
Status: DISCONTINUED | OUTPATIENT
Start: 2021-06-02 | End: 2021-06-04 | Stop reason: HOSPADM

## 2021-06-02 RX ORDER — NICOTINE POLACRILEX 4 MG
15 LOZENGE BUCCAL PRN
Status: DISCONTINUED | OUTPATIENT
Start: 2021-06-02 | End: 2021-06-04 | Stop reason: HOSPADM

## 2021-06-02 RX ORDER — MAGNESIUM SULFATE IN WATER 40 MG/ML
2000 INJECTION, SOLUTION INTRAVENOUS PRN
Status: DISCONTINUED | OUTPATIENT
Start: 2021-06-02 | End: 2021-06-04 | Stop reason: HOSPADM

## 2021-06-02 RX ORDER — PROMETHAZINE HYDROCHLORIDE 25 MG/1
12.5 TABLET ORAL EVERY 6 HOURS PRN
Status: DISCONTINUED | OUTPATIENT
Start: 2021-06-02 | End: 2021-06-04 | Stop reason: HOSPADM

## 2021-06-02 RX ORDER — SODIUM CHLORIDE 9 MG/ML
1000 INJECTION, SOLUTION INTRAVENOUS ONCE
Status: COMPLETED | OUTPATIENT
Start: 2021-06-03 | End: 2021-06-03

## 2021-06-02 RX ORDER — SODIUM CHLORIDE 9 MG/ML
25 INJECTION, SOLUTION INTRAVENOUS PRN
Status: DISCONTINUED | OUTPATIENT
Start: 2021-06-02 | End: 2021-06-04 | Stop reason: HOSPADM

## 2021-06-02 RX ORDER — SODIUM CHLORIDE 0.9 % (FLUSH) 0.9 %
10 SYRINGE (ML) INJECTION EVERY 12 HOURS SCHEDULED
Status: DISCONTINUED | OUTPATIENT
Start: 2021-06-03 | End: 2021-06-04 | Stop reason: HOSPADM

## 2021-06-02 RX ORDER — SODIUM CHLORIDE 0.9 % (FLUSH) 0.9 %
10 SYRINGE (ML) INJECTION PRN
Status: DISCONTINUED | OUTPATIENT
Start: 2021-06-02 | End: 2021-06-04 | Stop reason: HOSPADM

## 2021-06-02 RX ADMIN — SODIUM CHLORIDE 1000 ML: 9 INJECTION, SOLUTION INTRAVENOUS at 23:54

## 2021-06-02 RX ADMIN — SODIUM CHLORIDE 1000 ML: 9 INJECTION, SOLUTION INTRAVENOUS at 19:24

## 2021-06-02 ASSESSMENT — ENCOUNTER SYMPTOMS: TACHYPNEA: 1

## 2021-06-02 ASSESSMENT — PAIN SCALES - GENERAL
PAINLEVEL_OUTOF10: 0
PAINLEVEL_OUTOF10: 10

## 2021-06-02 ASSESSMENT — PAIN DESCRIPTION - LOCATION: LOCATION: ARM

## 2021-06-02 ASSESSMENT — PAIN DESCRIPTION - ORIENTATION: ORIENTATION: RIGHT;LEFT

## 2021-06-02 NOTE — ED NOTES
Bed: 07  Expected date:   Expected time:   Means of arrival:   Comments:  [facility] [address] [facility], [address] [place] [address]  06/02/21 [id number]

## 2021-06-02 NOTE — ED PROVIDER NOTES
[facility]  ED    CHIEF COMPLAINT  Hyperglycemia (pt has second covid vaccine yesterday and hasnt felt great since, bs 544 per EMS, pt took insulin today )    HISTORY OF PRESENT ILLNESS  Vandana Alvarado is a 57 y.o. female who presents to the ED with reports of weakness and that she fell today. Reports today is the first day she has been having these complaints. She also reports hurting all over. Had her second COVID vaccine yesterday. Patient lives alone. EMS reported her blood sugar was 544, patient took her insulin today. She is very somnolent and actually having some slurred speech. She is following commands and is alert and oriented to person, time and place. Even though she is following commands there are times where she does not seem to understand what I am telling her to do. Poor historian. The patient is currently rating their pain as 10/10 and describes it as an aching type of pain. The patient arrived to the ED via EMS transport. Nursing notes reviewed. Past Medical History:   Diagnosis Date    Abnormal brain MRI 7/20/2017    Partially empty sella and minimal chronic small vessel ischemic disease    Acute bilateral low back pain without sciatica 11/2/2016    SHARRON (acute kidney injury) ([address] 7/5/2017    Arthritis     back    Bipolar disorder ([address] 10/18/2008    CAD (coronary artery disease)     stent placed 6/8/20    Cancer [facility]) 2015    bilateral breast:s/p lumpectomy/radiation:under care care of breast specialist:Dr. Boone     Carotid stenosis, bilateral:<50%:per US 7/2016 7/15/2016    Carpal tunnel syndrome 10/18/2008    Cervical cancer screening 2014    Nml per pt'.     Coronary artery disease of native artery of native heart with stable angina pectoris ([address] 6/9/2020    DDD (degenerative disc disease), lumbar 7/18/2018    Depression     under care of pschiatrist:Dr. 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     Scoliosis     Spondylosis of lumbar region without myelopathy or radiculopathy 3/10/2017    Transient cerebral ischemia 07/15/2016    TIA:7/10/16    Unspecified cerebral artery occlusion with cerebral infarction     TIA     Past Surgical History:   Procedure Laterality Date    BREAST LUMPECTOMY  2015    Bilateral:breast cancer    CARDIAC CATHETERIZATION  06/08/2020    Dr. Angelica Kwong), DAYANA to Diag 1    COLONOSCOPY N/A 2/1/2021    COLONOSCOPY DIAGNOSTIC performed by Carrie Merchant MD at [address] [facility] CT BONE MARROW BIOPSY  2/3/2021    CT BONE MARROW BIOPSY 2/3/2021 Anthony Henderson MD [facility] CT SCAN    HYSTERECTOMY      Benign:no cervical cancer per pt'    KIDNEY REMOVAL      right    OTHER SURGICAL HISTORY Right     orif right ankle    TUBAL LIGATION      UPPER GASTROINTESTINAL ENDOSCOPY N/A 1/29/2021    EGD BIOPSY performed by Carrie Merchant MD at [address]     Family History   Problem Relation Age of Onset    Cancer Mother         breast    Cancer Father     Heart Failure Neg Hx     High Cholesterol Neg Hx     Hypertension Neg Hx     Migraines Neg Hx     Rashes/Skin Problems Neg Hx     Seizures Neg Hx     Stroke Neg Hx     Thyroid Disease Neg Hx     Diabetes Neg Hx      Social History     Socioeconomic History    Marital status:      Spouse name: Not on file    Number of children: 1    Years of education: Not on file    Highest education level: Not on file   Occupational History    Occupation:    Tobacco Use    Smoking status: Former Smoker     Packs/day: 0.50     Years: 20.00     Pack years: 10.00     Types: Cigarettes, Cigars     Quit date: 7/3/2014     Years since quittin.9    Smokeless tobacco: Never Used   Vaping Use    Vaping Use: Never used   Substance and Sexual Activity    Alcohol use: No     Alcohol/week: 0.0 standard drinks    Drug use: No    Sexual activity: Never   Other Topics Concern    Not on file   Social History Narrative    Not on file     Social Determinants of Health     Financial Resource Strain:     Difficulty of Paying Living Expenses:    Food Insecurity:     Worried About Running Out of Food in the Last Year:     [address] in the Last Year:    Transportation Needs:     Lack of Transportation (Medical):      Lack of Transportation (Non-Medical):    Physical Activity:     Days of Exercise per Week:     Minutes of Exercise per Session:    Stress:     Feeling of Stress :    Social Connections:     Frequency of Communication with Friends and Family:     Frequency of Social Gatherings with Friends and Family:     Attends Sabianist Services:     Active Member of Clubs or Organizations:     Attends Club or Organization Meetings:     Marital Status:    Intimate Partner Violence:     Fear of Current or Ex-Partner:     Emotionally Abused:     Physically Abused:     Sexually Abused:      Current Facility-Administered Medications   Medication Dose Route Frequency Provider Last Rate Last Admin    0.9 % sodium chloride infusion  1,000 mL Intravenous Continuous SARTHAK Ladd CNP         Current Outpatient Medications   Medication Sig Dispense Refill    Blood Glucose Monitoring Suppl (TRUE METRIX METER) w/Device KIT Use daily for blood sugar check 1 kit 1    chlorthalidone (HYGROTON) 25 MG tablet Take 1 tablet by mouth daily 30 tablet 3    lisinopril (PRINIVIL;ZESTRIL) 40 MG tablet Take 1 tablet by mouth daily 90 tablet 1    atorvastatin (LIPITOR) 20 MG tablet Take 1 tablet by mouth daily 30 tablet 5    paliperidone (INVEGA) 9 MG extended release tablet Take 9 mg by mouth every morning       pantoprazole (PROTONIX) 40 MG tablet Take 40 mg by mouth daily       ferrous sulfate (IRON 325) 325 (65 Fe) MG tablet Take 325 mg by mouth daily (with breakfast)      oxyCODONE-acetaminophen (PERCOCET) 7.5-325 MG per tablet TAKE 1 TABLET BY MOUTH EVERY 6 (SIX) HOURS AS NEEDED FOR UP TO 28 DAYS.  clonazePAM (KLONOPIN) 0.5 MG tablet Take 0.5 mg by mouth 3 times daily as needed.  ticagrelor (BRILINTA) 90 MG TABS tablet Take 1 tablet by mouth 2 times daily 60 tablet 1    TRUE METRIX BLOOD GLUCOSE TEST strip USE AS DIRECTED TO TEST 4 TIMES A  strip 5    insulin glargine (LANTUS SOLOSTAR) 100 UNIT/ML injection pen Inject 25 Units into the skin every morning (Patient taking differently: Inject 30 Units into the skin every morning ) 1 pen 1    nitroGLYCERIN (NITROSTAT) 0.4 MG SL tablet up to max of 3 total doses. If no relief after 1 dose, call 911. 25 tablet 3    Insulin Pen Needle (UNIFINE PENTIPS) 32G X 4 MM MISC USE AS DIRECTED 3 TIMES A  each 6    cetirizine (ZYRTEC) 10 MG tablet Take 10 mg by mouth daily      Blood Pressure Monitor GINA Check BP at home once or twice a day 1 Device 0    Insulin Syringe-Needle U-100 31G X 5/16\" 0.3 ML MISC USE 3 TIMES A DAY BEFORE MEALS 90 each 7    calcium carbonate-vitamin D (CALTRATE) 600-400 MG-UNIT TABS per tab Take 1 tablet by mouth daily       glucagon 1 MG injection Inject 1 mg into the muscle See Admin Instructions Follow package directions for low blood sugar.  1 kit 0    aspirin 81 MG tablet Take 81 mg by mouth daily      gabapentin (NEURONTIN) 600 MG tablet Take 600 mg by mouth 3 times daily      traZODone (DESYREL) 100 MG tablet Take 100 mg by mouth nightly       Allergies   Allergen Reactions    Morphine Anaphylaxis and Hives     feels like throat is closing    Penicillins Hives and Swelling    Codeine Hives and Rash    Penicillin G Rash       REVIEW OF SYSTEMS  10 systems reviewed, pertinent positives per HPI otherwise noted to be negative    PHYSICAL EXAM  /76   Pulse 71   Temp 99.9 °F (37.7 °C) (Oral)   Resp 15   Ht 5' 3\" (1.6 m)   Wt 133 lb (60.3 kg)   SpO2 97%   BMI 23.56 kg/m²   GENERAL APPEARANCE: Well developed, well nourished. Awake and alert. Cooperative. Observed resting in bed. No acute distress. HEAD: Normocephalic. Atraumatic. No facial asymmetry upon exam. Sensation is intact to light touch to the face. Smile is symmetrical, tongue is midline. Uvula is midline and elevates appropriately. Posterior oropharynx is without erythema, edema, or exudate. EYES: Sclera is non-icteric. Conjunctiva normal. EOMI-but had difficulty completing the task and needed frequent re-direction, PERRL. No nystagmus. ENT: External ears are normal. Mucous membranes are moist.   NECK: Supple. Normal ROM. Trachea mid-line. Cardiac: Regular rate and rhythm. Capillary refill is brisk in bilateral upper extremities. S1/S2 is normal. There are no murmurs, rubs, or gallops to auscultation. LUNGS: Breathing is unlabored. Speaking comfortably in full sentences. Equal and symmetric chest rise. Breath sounds are clear throughout. There is no wheezing, rales, or rhonchi to auscultation. Abdomen: Non-distended. Non-tender to palpation. Bowel sounds are positive in all 4 quadrants. There is no hepatosplenomegaly to palpation. Musculoskeletal: Normal ROM. No gross deformities or trauma noted. Moving all extremities equally and appropriately. NEUROLOGICAL: Alert and oriented x3. Bilateral upper and lower extremity weakness but this is bilateral. Gait observed and after 2-3 steps her legs gave out. Heel to shin is intact bilaterally. SKIN: Warm and dry. Skin is with good color. Skin is intact. Psychiatric: Mood and affect appropriate.  Speech is slowed and slurred. PROCEDURES  None    RADIOLOGY  CT ABDOMEN PELVIS WO CONTRAST Additional Contrast? None    Result Date: 6/2/2021  EXAMINATION: CT OF THE ABDOMEN AND PELVIS WITHOUT CONTRAST 6/2/2021 7:55 pm TECHNIQUE: CT of the abdomen and pelvis was performed without the administration of intravenous contrast. Multiplanar reformatted images are provided for review. Dose modulation, iterative reconstruction, and/or weight based adjustment of the mA/kV was utilized to reduce the radiation dose to as low as reasonably achievable. COMPARISON: 02/01/2021 HISTORY: ORDERING SYSTEM PROVIDED HISTORY: SHARRON, concern for obstruction TECHNOLOGIST PROVIDED HISTORY: Reason for exam:->SHARRON, concern for obstruction Additional Contrast?->None Decision Support Exception - unselect if not a suspected or confirmed emergency medical condition->Emergency Medical Condition (MA) Reason for Exam: Concern for obstruction; GFR 20 Acuity: Acute Type of Exam: Initial Relevant Medical/Surgical History: hx of hysterectomy FINDINGS: Lower Chest: There is mild ground-glass opacity in the dependent right lower lobe, atelectasis versus pneumonitis. Organs: The liver is homogeneous and normal in attenuation, with chronic disproportionate hypertrophy of the left lobe. A calcification projects near the gallbladder neck, likely vascular. The pancreatic duct is chronically dilated, measuring 5 mm. There are multiple pancreatic calcifications. The spleen and adrenal glands are unremarkable. The right kidney is absent. Left kidney is hypertrophied. There is no hydronephrosis. GI/Bowel: The stomach and small bowel are unremarkable. There is moderate prominence of stool in the colon and rectum. No focal mural thickening is identified. Pelvis: Urinary bladder is unremarkable. Uterus is absent. Peritoneum/Retroperitoneum: There is moderate aortoiliac calcification, without aneurysm. No adenopathy.  Bones/Soft Tissues: Demineralization pattern in the spine is again identified. No acute osseous injury is appreciated. Right chest and abdominal wall hemiatrophy is again noted. No obstructive uropathy is identified. The left kidney is hypertrophied. No calculi are identified. Right nephrectomy is again noted, with stable postsurgical bed. Chronic pancreatitis with stable ectasia of the pancreatic duct. Prominence of stool in the colon and rectum, possible constipation. CT Head WO Contrast    Result Date: 6/2/2021  EXAMINATION: CT OF THE HEAD WITHOUT CONTRAST  6/2/2021 6:07 pm TECHNIQUE: CT of the head was performed without the administration of intravenous contrast. Dose modulation, iterative reconstruction, and/or weight based adjustment of the mA/kV was utilized to reduce the radiation dose to as low as reasonably achievable. COMPARISON: 03/05/2021 HISTORY: ORDERING SYSTEM PROVIDED HISTORY: fall, weakness TECHNOLOGIST PROVIDED HISTORY: Reason for exam:->fall, weakness Has a \"code stroke\" or \"stroke alert\" been called? ->No Decision Support Exception - unselect if not a suspected or confirmed emergency medical condition->Emergency Medical Condition (MA) Reason for Exam: frequent falls today, did not hit head, takes baby aspirin, h/o stroke Acuity: Acute Type of Exam: Initial FINDINGS: BRAIN/VENTRICLES: There is no acute intracranial hemorrhage, mass effect or midline shift. No abnormal extra-axial fluid collection. The gray-white differentiation is maintained without evidence of an acute infarct. There is no evidence of hydrocephalus. ORBITS: The visualized portion of the orbits demonstrate no acute abnormality. SINUSES: There is moderate mucosal thickening throughout the right ethmoid sinuses extending into the maxillary sinus superiorly on the right. .  And mastoid air cells demonstrate no acute abnormality. SOFT TISSUES/SKULL:  No acute abnormality of the visualized skull or soft tissues. No acute intracranial abnormality.  Chronic sinusitis on the right which is unchanged. XR CHEST PORTABLE    Result Date: 6/2/2021  EXAMINATION: ONE XRAY VIEW OF THE CHEST 6/2/2021 5:48 pm COMPARISON: 05/18/2021. HISTORY: ORDERING SYSTEM PROVIDED HISTORY: fall TECHNOLOGIST PROVIDED HISTORY: Reason for exam:->fall Reason for Exam: fall Acuity: Acute Type of Exam: Initial FINDINGS: The heart is normal.  The pulmonary vessels are normal.  The lungs are mildly hyperinflated and emphysematous. No consolidation or effusion is seen. There are surgical clips projecting along the right lower chest which are unchanged. No consolidation or effusion is seen. There is no pneumothorax. There is mild scoliosis. Stable chronic changes with no acute abnormality seen.      LABS  Results for orders placed or performed during the hospital encounter of 06/02/21   CBC Auto Differential   Result Value Ref Range    WBC 9.7 4.0 - 11.0 K/uL    RBC 4.12 4.00 - 5.20 M/uL    Hemoglobin 11.5 (L) 12.0 - 16.0 g/dL    Hematocrit 35.2 (L) 36.0 - 48.0 %    MCV 85.4 80.0 - 100.0 fL    MCH 27.8 26.0 - 34.0 pg    MCHC 32.6 31.0 - 36.0 g/dL    RDW 13.0 12.4 - 15.4 %    Platelets 695 036 - 678 K/uL    MPV 9.6 5.0 - 10.5 fL    Neutrophils % 77.2 %    Lymphocytes % 17.9 %    Monocytes % 4.4 %    Eosinophils % 0.2 %    Basophils % 0.3 %    Neutrophils Absolute 7.5 1.7 - 7.7 K/uL    Lymphocytes Absolute 1.7 1.0 - 5.1 K/uL    Monocytes Absolute 0.4 0.0 - 1.3 K/uL    Eosinophils Absolute 0.0 0.0 - 0.6 K/uL    Basophils Absolute 0.0 0.0 - 0.2 K/uL   Comprehensive Metabolic Panel w/ Reflex to MG   Result Value Ref Range    Sodium 132 (L) 136 - 145 mmol/L    Potassium reflex Magnesium 3.6 3.5 - 5.1 mmol/L    Chloride 90 (L) 99 - 110 mmol/L    CO2 30 21 - 32 mmol/L    Anion Gap 12 3 - 16    Glucose 413 (H) 70 - 99 mg/dL    BUN 21 (H) 7 - 20 mg/dL    CREATININE 1.6 (H) 0.6 - 1.2 mg/dL    GFR Non- 33 (A) >60    GFR  39 (A) >60    Calcium 10.4 8.3 - 10.6 mg/dL    Total Protein 9.0 (H) 6.4 - 8.2 g/dL    Albumin 4.2 3.4 - 5.0 g/dL    Albumin/Globulin Ratio 0.9 (L) 1.1 - 2.2    Total Bilirubin 0.3 0.0 - 1.0 mg/dL    Alkaline Phosphatase 239 (H) 40 - 129 U/L    ALT 70 (H) 10 - 40 U/L    AST 49 (H) 15 - 37 U/L    Globulin 4.8 g/dL   Troponin   Result Value Ref Range    Troponin <0.01 <0.01 ng/mL   Urinalysis, reflex to microscopic   Result Value Ref Range    Color, UA Straw Straw/Yellow    Clarity, UA Clear Clear    Glucose, Ur >=1000 (A) Negative mg/dL    Bilirubin Urine Negative Negative    Ketones, Urine Negative Negative mg/dL    Specific Gravity, UA 1.010 1.005 - 1.030    Blood, Urine TRACE-INTACT (A) Negative    pH, UA 6.5 5.0 - 8.0    Protein, UA Negative Negative mg/dL    Urobilinogen, Urine 0.2 <2.0 E.U./dL    Nitrite, Urine Negative Negative    Leukocyte Esterase, Urine Negative Negative    Microscopic Examination YES     Urine Type NotGiven    Blood gas, venous   Result Value Ref Range    pH, Kyle 7.366 7.350 - 7.450    pCO2, Kyle 57.7 (H) 40.0 - 50.0 mmHg    pO2, Kyle 21.2 (L) 25 - 40 mmHg    HCO3, Venous 32.3 (H) 23.0 - 29.0 mmol/L    Base Excess, Kyle 5.5 (H) -3.0 - 3.0 mmol/L    O2 Sat, Kyle 37 Not Established %    Carboxyhemoglobin 1.6 (H) 0.0 - 1.5 %    MetHgb, Kyle 0.8 <1.5 %    TC02 (Calc), Kyle 34 Not Established mmol/L    O2 Content, Kyle 6 Not Established VOL %    O2 Therapy Unknown    Lactate, Sepsis   Result Value Ref Range    Lactic Acid, Sepsis 3.2 (H) 0.4 - 1.9 mmol/L   Microscopic Urinalysis   Result Value Ref Range    WBC, UA 0-2 0 - 5 /HPF    RBC, UA 3-4 0 - 4 /HPF    Bacteria, UA Rare (A) None Seen /HPF   POCT Glucose   Result Value Ref Range    POC Glucose 412 (H) 70 - 99 mg/dl    Performed on [facility]    EKG 12 Lead   Result Value Ref Range    Ventricular Rate 71 BPM    Atrial Rate 71 BPM    P-R Interval 156 ms    QRS Duration 78 ms    Q-T Interval 384 ms    QTc Calculation (Bazett) 417 ms    P Axis 47 degrees    R Axis -34 degrees    T Axis 56 degrees    Diagnosis nephrectomy noted. She has chronic pancreatitis with stable ectasia of the pancreatic duct. Prominence of stool in the colon and rectum, possibly constipation. While in the ED the patient received   Medications   0.9 % sodium chloride infusion (has no administration in time range)   0.9 % sodium chloride bolus (1,000 mLs Intravenous New Bag 6/2/21 1924)     I do feel the patient will require admission due to her SHARRON and lactic acidosis. Neena Baldwin will be admitted for further observation and evaluation of Smitha Fried's mental status change. As I have deemed necessary from their history, physical and studies, I have considered and evaluated Neena Baldwin for the following diagnoses:  DIABETES, INTRACRANIAL HEMORRHAGE, MENINGITIS, SEPSIS SUBARACHNOID HEMORRHAGE, SUBDURAL HEMATOMA, & STROKE. The total critical care time spent while evaluating and treating this patient was 42 minutes. This excludes time spent doing separately billable procedures. This includes time at the bedside, data interpretation, medication management, obtaining critical history from collateral sources if the patient is unable to provide it directly, and physician consultation. Specifics of interventions taken and potentially life-threatening diagnostic considerations are listed above in the medical decision making. Clinical Impression  Final diagnoses:   SHARRON (acute kidney injury) ([facility] [address]   Elevated transaminase level   Hyperglycemia   Lactic acidosis   Encephalopathy       DISPOSITION  Admit. Comment: Please note this report has been produced using speech recognition software and may contain errors related to that system including errors in grammar, punctuation, and spelling, as well as words and phrases that may be inappropriate. If there are any questions or concerns please feel free to contact the dictating provider for clarification.         SARTHAK Quinn - CNP  06/02/21 [id number]

## 2021-06-03 ENCOUNTER — APPOINTMENT (OUTPATIENT)
Dept: MRI IMAGING | Age: 61
End: 2021-06-03
Payer: MEDICAID

## 2021-06-03 PROBLEM — E43 SEVERE MALNUTRITION (HCC): Chronic | Status: ACTIVE | Noted: 2021-06-03

## 2021-06-03 LAB
A/G RATIO: 0.8 (ref 1.1–2.2)
ALBUMIN SERPL-MCNC: 3.4 G/DL (ref 3.4–5)
ALP BLD-CCNC: 194 U/L (ref 40–129)
ALT SERPL-CCNC: 52 U/L (ref 10–40)
AMMONIA: 37 UMOL/L (ref 11–51)
ANION GAP SERPL CALCULATED.3IONS-SCNC: 12 MMOL/L (ref 3–16)
AST SERPL-CCNC: 38 U/L (ref 15–37)
BASOPHILS ABSOLUTE: 0 K/UL (ref 0–0.2)
BASOPHILS RELATIVE PERCENT: 0.3 %
BILIRUB SERPL-MCNC: 0.3 MG/DL (ref 0–1)
BUN BLDV-MCNC: 19 MG/DL (ref 7–20)
CALCIUM SERPL-MCNC: 9.4 MG/DL (ref 8.3–10.6)
CHLORIDE BLD-SCNC: 99 MMOL/L (ref 99–110)
CO2: 26 MMOL/L (ref 21–32)
CREAT SERPL-MCNC: 1.6 MG/DL (ref 0.6–1.2)
EKG ATRIAL RATE: 71 BPM
EKG DIAGNOSIS: NORMAL
EKG P AXIS: 47 DEGREES
EKG P-R INTERVAL: 156 MS
EKG Q-T INTERVAL: 384 MS
EKG QRS DURATION: 78 MS
EKG QTC CALCULATION (BAZETT): 417 MS
EKG R AXIS: -34 DEGREES
EKG T AXIS: 56 DEGREES
EKG VENTRICULAR RATE: 71 BPM
EOSINOPHILS ABSOLUTE: 0 K/UL (ref 0–0.6)
EOSINOPHILS RELATIVE PERCENT: 0.1 %
ESTIMATED AVERAGE GLUCOSE: 392.4 MG/DL
GAMMA GLUTAMYL TRANSFERASE: 311 U/L (ref 5–36)
GFR AFRICAN AMERICAN: 39
GFR NON-AFRICAN AMERICAN: 33
GLOBULIN: 4.1 G/DL
GLUCOSE BLD-MCNC: 138 MG/DL (ref 70–99)
GLUCOSE BLD-MCNC: 178 MG/DL (ref 70–99)
GLUCOSE BLD-MCNC: 209 MG/DL (ref 70–99)
GLUCOSE BLD-MCNC: 238 MG/DL (ref 70–99)
GLUCOSE BLD-MCNC: 286 MG/DL (ref 70–99)
GLUCOSE BLD-MCNC: 304 MG/DL (ref 70–99)
GLUCOSE BLD-MCNC: 45 MG/DL (ref 70–99)
GLUCOSE BLD-MCNC: 49 MG/DL (ref 70–99)
GLUCOSE BLD-MCNC: 68 MG/DL (ref 70–99)
GLUCOSE BLD-MCNC: 76 MG/DL (ref 70–99)
HBA1C MFR BLD: 15.3 %
HCT VFR BLD CALC: 33.3 % (ref 36–48)
HEMOGLOBIN: 10.9 G/DL (ref 12–16)
LYMPHOCYTES ABSOLUTE: 1 K/UL (ref 1–5.1)
LYMPHOCYTES RELATIVE PERCENT: 12.5 %
MCH RBC QN AUTO: 28.2 PG (ref 26–34)
MCHC RBC AUTO-ENTMCNC: 32.7 G/DL (ref 31–36)
MCV RBC AUTO: 86.3 FL (ref 80–100)
MONOCYTES ABSOLUTE: 0.8 K/UL (ref 0–1.3)
MONOCYTES RELATIVE PERCENT: 9.6 %
NEUTROPHILS ABSOLUTE: 6.4 K/UL (ref 1.7–7.7)
NEUTROPHILS RELATIVE PERCENT: 77.5 %
OSMOLALITY URINE: 506 MOSM/KG (ref 390–1070)
OSMOLALITY: 304 MOSM/KG (ref 280–301)
PDW BLD-RTO: 12.9 % (ref 12.4–15.4)
PERFORMED ON: ABNORMAL
PERFORMED ON: NORMAL
PLATELET # BLD: 125 K/UL (ref 135–450)
PMV BLD AUTO: 9.6 FL (ref 5–10.5)
POTASSIUM REFLEX MAGNESIUM: 3.8 MMOL/L (ref 3.5–5.1)
RBC # BLD: 3.85 M/UL (ref 4–5.2)
SODIUM BLD-SCNC: 137 MMOL/L (ref 136–145)
TOTAL CK: 120 U/L (ref 26–192)
TOTAL PROTEIN: 7.5 G/DL (ref 6.4–8.2)
UREA NITROGEN, UR: 325.3 MG/DL (ref 800–1666)
WBC # BLD: 8.3 K/UL (ref 4–11)

## 2021-06-03 PROCEDURE — 6370000000 HC RX 637 (ALT 250 FOR IP): Performed by: INTERNAL MEDICINE

## 2021-06-03 PROCEDURE — 82550 ASSAY OF CK (CPK): CPT

## 2021-06-03 PROCEDURE — 80053 COMPREHEN METABOLIC PANEL: CPT

## 2021-06-03 PROCEDURE — 92610 EVALUATE SWALLOWING FUNCTION: CPT

## 2021-06-03 PROCEDURE — 92526 ORAL FUNCTION THERAPY: CPT

## 2021-06-03 PROCEDURE — G0378 HOSPITAL OBSERVATION PER HR: HCPCS

## 2021-06-03 PROCEDURE — 2580000003 HC RX 258: Performed by: INTERNAL MEDICINE

## 2021-06-03 PROCEDURE — 36415 COLL VENOUS BLD VENIPUNCTURE: CPT

## 2021-06-03 PROCEDURE — 96361 HYDRATE IV INFUSION ADD-ON: CPT

## 2021-06-03 PROCEDURE — 93010 ELECTROCARDIOGRAM REPORT: CPT | Performed by: INTERNAL MEDICINE

## 2021-06-03 PROCEDURE — 6370000000 HC RX 637 (ALT 250 FOR IP): Performed by: NURSE PRACTITIONER

## 2021-06-03 PROCEDURE — 82140 ASSAY OF AMMONIA: CPT

## 2021-06-03 PROCEDURE — 83036 HEMOGLOBIN GLYCOSYLATED A1C: CPT

## 2021-06-03 PROCEDURE — 72148 MRI LUMBAR SPINE W/O DYE: CPT

## 2021-06-03 PROCEDURE — 82977 ASSAY OF GGT: CPT

## 2021-06-03 PROCEDURE — 85025 COMPLETE CBC W/AUTO DIFF WBC: CPT

## 2021-06-03 RX ORDER — ATORVASTATIN CALCIUM 10 MG/1
20 TABLET, FILM COATED ORAL DAILY
Status: DISCONTINUED | OUTPATIENT
Start: 2021-06-03 | End: 2021-06-04 | Stop reason: HOSPADM

## 2021-06-03 RX ORDER — OXYCODONE AND ACETAMINOPHEN 7.5; 325 MG/1; MG/1
1 TABLET ORAL EVERY 6 HOURS PRN
Status: DISCONTINUED | OUTPATIENT
Start: 2021-06-03 | End: 2021-06-04 | Stop reason: HOSPADM

## 2021-06-03 RX ORDER — PALIPERIDONE 3 MG/1
9 TABLET, EXTENDED RELEASE ORAL EVERY MORNING
Status: DISCONTINUED | OUTPATIENT
Start: 2021-06-03 | End: 2021-06-04 | Stop reason: HOSPADM

## 2021-06-03 RX ORDER — PANTOPRAZOLE SODIUM 40 MG/1
40 TABLET, DELAYED RELEASE ORAL DAILY
Status: DISCONTINUED | OUTPATIENT
Start: 2021-06-03 | End: 2021-06-04 | Stop reason: HOSPADM

## 2021-06-03 RX ORDER — INSULIN GLARGINE 100 [IU]/ML
25 INJECTION, SOLUTION SUBCUTANEOUS EVERY MORNING
Status: DISCONTINUED | OUTPATIENT
Start: 2021-06-03 | End: 2021-06-04 | Stop reason: HOSPADM

## 2021-06-03 RX ORDER — ASPIRIN 81 MG/1
81 TABLET, CHEWABLE ORAL DAILY
Status: DISCONTINUED | OUTPATIENT
Start: 2021-06-03 | End: 2021-06-04 | Stop reason: HOSPADM

## 2021-06-03 RX ORDER — NICOTINE 21 MG/24HR
1 PATCH, TRANSDERMAL 24 HOURS TRANSDERMAL DAILY
Status: DISCONTINUED | OUTPATIENT
Start: 2021-06-03 | End: 2021-06-04 | Stop reason: HOSPADM

## 2021-06-03 RX ADMIN — INSULIN LISPRO 4 UNITS: 100 INJECTION, SOLUTION INTRAVENOUS; SUBCUTANEOUS at 11:50

## 2021-06-03 RX ADMIN — TICAGRELOR 90 MG: 90 TABLET ORAL at 00:41

## 2021-06-03 RX ADMIN — PANTOPRAZOLE SODIUM 40 MG: 40 TABLET, DELAYED RELEASE ORAL at 11:49

## 2021-06-03 RX ADMIN — INSULIN LISPRO 8 UNITS: 100 INJECTION, SOLUTION INTRAVENOUS; SUBCUTANEOUS at 08:46

## 2021-06-03 RX ADMIN — Medication 10 ML: at 00:00

## 2021-06-03 RX ADMIN — SODIUM CHLORIDE 1000 ML: 9 INJECTION, SOLUTION INTRAVENOUS at 00:00

## 2021-06-03 RX ADMIN — PALIPERIDONE 9 MG: 3 TABLET, EXTENDED RELEASE ORAL at 11:49

## 2021-06-03 RX ADMIN — INSULIN LISPRO 2 UNITS: 100 INJECTION, SOLUTION INTRAVENOUS; SUBCUTANEOUS at 20:44

## 2021-06-03 RX ADMIN — ASPIRIN 81 MG: 81 TABLET, CHEWABLE ORAL at 11:49

## 2021-06-03 RX ADMIN — OXYCODONE HYDROCHLORIDE AND ACETAMINOPHEN 1 TABLET: 7.5; 325 TABLET ORAL at 20:44

## 2021-06-03 RX ADMIN — INSULIN LISPRO 12 UNITS: 100 INJECTION, SOLUTION INTRAVENOUS; SUBCUTANEOUS at 00:00

## 2021-06-03 RX ADMIN — INSULIN GLARGINE 25 UNITS: 100 INJECTION, SOLUTION SUBCUTANEOUS at 11:50

## 2021-06-03 RX ADMIN — TICAGRELOR 90 MG: 90 TABLET ORAL at 11:49

## 2021-06-03 RX ADMIN — TICAGRELOR 90 MG: 90 TABLET ORAL at 20:44

## 2021-06-03 RX ADMIN — Medication 10 ML: at 20:44

## 2021-06-03 RX ADMIN — INSULIN LISPRO 6 UNITS: 100 INJECTION, SOLUTION INTRAVENOUS; SUBCUTANEOUS at 16:32

## 2021-06-03 RX ADMIN — ACETAMINOPHEN 650 MG: 325 TABLET ORAL at 00:41

## 2021-06-03 RX ADMIN — ATORVASTATIN CALCIUM 20 MG: 10 TABLET, FILM COATED ORAL at 11:49

## 2021-06-03 ASSESSMENT — PAIN SCALES - GENERAL
PAINLEVEL_OUTOF10: 2
PAINLEVEL_OUTOF10: 9
PAINLEVEL_OUTOF10: 9

## 2021-06-03 ASSESSMENT — PAIN DESCRIPTION - PAIN TYPE: TYPE: CHRONIC PAIN

## 2021-06-03 ASSESSMENT — PAIN DESCRIPTION - LOCATION: LOCATION: BACK

## 2021-06-03 NOTE — PROGRESS NOTES
Hospitalist Progress Note      PCP: Kourtney Willett    Date of Admission: 6/2/2021    Chief Complaint on Admission:    Hyperglycemia       pt has second covid vaccine yesterday and hasnt felt great since, bs 544 per EMS, pt took insulin today       History Of Present Illness:       57 y.o. female who presented to [facility] with past medical history of bipolar, degenerative disc disease, depression, diabetes, gout, hypertension, CKD stage III, neuropathy, chronic pancreatitis presented to the ED for fall and weakness.     Patient was encephalopathic on my evaluation thus history is limited. Patient reported that she is fallen having weakness and lives alone takes care of herself independently but does not know exactly what medication she takes.     I spoke with primary decision maker, and patient is normally independent functional but has been noticed to be forgetting some of his medication routinely. Pt Seen/Examined and Chart Reviewed. Admitting dx: Acaute encephalopathy    SUBJECTIVE:   Patient complaining of acute lower back pain. Granddaughter concerned about previous MRI noted for epidural lipomatosis. Concerned that this may be progressing and causing weakness and recent falls.       Bedside nursing rounding completed    OBJECTIVE:     Allergies  Morphine, Penicillins, Codeine, and Penicillin g    Medications      Scheduled Meds:   nicotine  1 patch Transdermal Daily    aspirin  81 mg Oral Daily    atorvastatin  20 mg Oral Daily    insulin glargine  25 Units Subcutaneous QAM    ticagrelor  90 mg Oral BID    paliperidone  9 mg Oral QAM    pantoprazole  40 mg Oral Daily    sodium chloride flush  10 mL Intravenous 2 times per day    insulin lispro  0-12 Units Subcutaneous Q4H       Infusions:   sodium chloride Stopped (06/03/21 1215)    sodium chloride      dextrose         PRN Meds:  sodium chloride flush, sodium chloride, potassium chloride, magnesium sulfate, promethazine **OR** by generalized weakness and frequent falls. Multifactorial, suspect SHARRON, meds SHIVANI, THC   - CT head negative  - MRI spine for eval of epidural lipomatosis  - Labs reviewed  - UDS positive for THC and oxycodone     Uncontrolled type 2 diabetes with hyperglycemia:   - Blood glucose 400+ with no anion gap  - Meds:  started ISS, Lantus  - HGBA1C 15.3 % on 6/3/21  - POC BG QAC/HS  - Diet: Low concentrated CARBS     Acute kidney injury:   - Patient has one kidney in her medical history  - Baseline Cr 0.9-1.2  - IVF X 2 boluses  - Hold Lisinopril, Chlothalidone,   -Avoid unnecessary nephrotxiins  -Encourage nutrition and fluid intake.      Elevated ALP:  -Improving with fluids  -Labs: Trend LFT  - GGT elevated at 331  -Judicious use of liver toxic meds, closely monitor    Normocytic anemia:  - Labs: 2/21 iron ( low range), Vit B12 and folate in range.    - Hemoccult ordered  - Encourage nutrition     Malnutrition calorie:  -Nutrition consultation, poor po intake and overall appetite  -Suspect complicated with Chronic pancreatitis with stable ectasia of the pancreatic duct     Chronic medical conditions:  Essential Hypertension: Continue home medication  Hyperlipidemia: Continue home medication  Chronic pancreatitis: Continue to monitor  Depression: continue home medications     Diet: renal     DVT Prophylaxis: held     Dispo:   Expected LOS greater than two midnights          Анна Bullock MD

## 2021-06-03 NOTE — PROGRESS NOTES
4 Eyes Skin Assessment     The patient is being assess for  Admission    I agree that 2 RN's have performed a thorough Head to Toe Skin Assessment on the patient. ALL assessment sites listed below have been assessed. Areas assessed by both nurses:   [x]   Head, Face, and Ears   [x]   Shoulders, Back, and Chest  [x]   Arms, Elbows, and Hands   [x]   Coccyx, Sacrum, and Ischum  [x]   Legs, Feet, and Heels        Does the Patient have Skin Breakdown?   No         Maco Prevention initiated:  NA   Wound Care Orders initiated:  NA      [facility] nurse consulted for Pressure Injury (Stage 3,4, Unstageable, DTI, NWPT, and Complex wounds):  NA      Nurse 1 eSignature: Electronically signed by Reyes Limbo, RN on 6/3/21 at 6:45 AM EDT    **SHARE this note so that the co-signing nurse is able to place an eSignature**    Nurse 2 eSignature: Electronically signed by Jessenia Malave RN on 6/3/21 at 6:46 AM EDT

## 2021-06-03 NOTE — ACP (ADVANCE CARE PLANNING)
ADVANCED CARE PLANNING    Name:Agustina Ro:  1960              MRN:  [id number]  Admission Date: 6/2/2021  5:25 PM  Date of Discussion:  06/02/2021      Purpose of Encounter: Advanced care planning in light of Hospitalization/  Parties in attendance: :Clement Adams DO, Family members: Primary POA, decision maker Ms. Tobar  Decisional Capacity:Yes      Objective/Medical Story: Patient 79-year-old female with past medical history of hypertension, type 2 diabetes insulin-dependent with CKD stage III one kidney, chronic pancreatitis admitted for acute encephalopathy. In the light of hospitalization unable to obtain history from Ms. Omega Wang. I contacted the POA and discussed the case in addition to goals of care. Patient's wishes us to proceed with full scope of medicine and continue to be full code    Active Diagnoses: Active Hospital Problems    Diagnosis Date Noted    SHARRON (acute kidney injury) ([facility] [address] [N17.9] 07/05/2017       These active diagnoses are of sufficient risk that focused discussion on advance care planning is indicated in order to allow the patient to thoughtfully consider personal goals of care; and if situations arise that prevent the ability to personally give input, to ensure appropriate representation of their personal desires for different levels and agressiveness of care. Goals of Care Determinations: Patient wishes to focus on remaining Full Code and proceeding with full scope of practice. Code Status: At this time patient wishes to be Full Code         Time Spent on Advanced Planning Documents: 16 mins. The following items were considered in medical decision making:  Independent review of images , Review / order clinical lab tests. Review / order radiology tests, Decision to obtain old records. Advanced Care Planning Documents:  Completed advances directives, advanced directives in chart.         Total time spent face-to-face in education and

## 2021-06-03 NOTE — PROGRESS NOTES
Perfect serve message sent to Dr. Tamia Jerez, \"Ok to send pt to MRI w/out tele? Thanks. \", awaiting response.

## 2021-06-03 NOTE — PROGRESS NOTES
Speech Language Pathology  Facility/Department: [facility] C3 TELE/MED SURG/ONC   CLINICAL BEDSIDE SWALLOW EVALUATION    NAME: Tracy Carbajal  : 1960  MRN: [id number]    ADMISSION DATE: 2021  ADMITTING DIAGNOSIS: has Acute hyperglycemia; Essential hypertension; Bilateral malignant neoplasm of breast in female [facility]); Microalbuminuria; Obesity (BMI 30-39.9); Slurred speech; Hx of ischemic CVA; Brain metastases ([address] Carotid stenosis, bilateral:<50%:per [place] 2016; SHARRON (acute kidney injury) ([address] Lactic acidosis; Frequent falls; Depression/anxiety; Abnormal brain MRI; Acute bilateral low back pain without sciatica; Uncontrolled type 2 diabetes mellitus with microalbuminuria, with long-term current use of insulin ([address] Closed fracture of right ankle, with routine healing, subsequent encounter; CKD (chronic kidney disease), stage III; Uncontrolled type 2 diabetes mellitus with diabetic nephropathy, with long-term current use of insulin ([address] 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 ([address] Dyslipidemia associated with type 2 diabetes mellitus ([address] Bipolar disorder ([address] Cardiomegaly; Cardiomyopathy ([address] Carpal tunnel syndrome; Chronic systolic heart failure ([address] Diffuse cystic mastopathy; Edema; Gallstone pancreatitis; Gout; History of bilateral breast cancer; History of renal cell carcinoma; Cardiomyopathy in other diseases classified elsewhere; Mononeuritis; Myalgia and myositis; Nonspecific abnormal electrocardiogram (ECG) (EKG); Patient in clinical research study; Peroneal muscular atrophy; Scoliosis (and kyphoscoliosis), idiopathic; SOBOE (shortness of breath on exertion); Syncope and collapse; Chronic pain disorder; DDD (degenerative disc disease), lumbar; Diabetic polyneuropathy associated with type 2 diabetes mellitus ([address] Other secondary scoliosis, lumbosacral region; Thoracic spondylosis without myelopathy; Morbid obesity due to excess calories ([address]  Age-related nuclear cataract of both in all situations     Treatment Plan  Requires SLP Intervention: No    No further SLP followup indicated as oral and pharyngeal phases of swallow appear WFL at time of evaluation. Please re-consult as needed. Recommended Diet and Intervention  Diet Solids Recommendation: Regular  Liquid Consistency Recommendation: Thin  Recommended Form of Meds: PO          Compensatory Swallowing Strategies  Compensatory Swallowing Strategies: Upright as possible for all oral intake;Eat/Feed slowly; Remain upright for 30-45 minutes after meals;Small bites/sips    Treatment/Goals    No further SLP followup indicated as oral and pharyngeal phases of swallow appear WFL at time of evaluation. Please re-consult as needed. General  Chart Reviewed: Yes  Behavior/Cognition: Alert; Cooperative;Pleasant mood  Respiratory Status: Room air  Follows Directions: Simple  Dentition: Dentures top (edentulous - bottom)  Patient Positioning: Upright in bed (sitting EOB)  Volitional Cough: Strong  Prior Dysphagia History: Pt denies dysphagia  Consistencies Administered: Reg solid; Dysphagia Pureed (Dysphagia I); Thin - teaspoon; Thin - cup; Thin - straw           Vision/Hearing  Vision  Vision: Within Functional Limits  Hearing  Hearing: Within functional limits    Oral Motor Deficits  Oral/Motor  Oral Motor: Within functional limits (pt has intermittent, repetitive tongue movements; tic-like)    Oral Phase Dysfunction  Oral phase of swallow grossly appears WNL. No oral phase deficits noted during evaluation. Indicators of Pharyngeal Phase Dysfunction  Pharyngeal phase of swallow appears grossly WNL. No pharyngeal deficits noted during evaluation. Laryngeal elevation appears WFL based upon palpation of anterior neck during swallow. Vocal quality dry before and after po trials. O2 sat % before and after po trials. Pharyngeal Phase: WNL  Pharyngeal: RR 12/min prior to po trials with O2 sat of 99% on RA.  No overt s/s of aspiration noted. Vocal quality remained dry and clear throughout assessment. RR stable at 12-14/min following po trials with O2 sat of 100%. Prognosis  Prognosis for safe diet advancement: good  Consulted and agree with results and recommendations: Patient;RN    Education  Patient Education: Dysphagia Tx: Education with pt regarding results of BSE, aspiration precautions  Patient Education Response: Verbalizes understanding;Demonstrated understanding  Safety Devices in place: Yes  Type of devices: All fall risk precautions in place       Therapy Time  SLP Individual Minutes  Time In: 1130  Time Out: [address] [facility]  Minutes: [address] [facility].  [place], [address] [id number]  Speech-Language Pathologist      6/3/2021 1:21 PM

## 2021-06-03 NOTE — PROGRESS NOTES
Pt arrived from ED to [address] via stretcher. Admission assessment completed per documentation. Pt A&Ox4. VSS. Pt awake in bed. Pt denies pain and discomfort. Bilateral lung sounds diminished. Abdomen soft and flat. Bowel sounds active. Bed locked and in lowest position. Bed alarm on. Nonskid slippers on. Side rails up 2/4. Call light and personal belongings within reach. Will continue to monitor.

## 2021-06-03 NOTE — PLAN OF CARE
Problem: Nutrition  Goal: Optimal nutrition therapy  Outcome: Ongoing  Note: Nutrition Problem #1: Severe malnutrition  Intervention: Food and/or Nutrient Delivery: Continue Current Diet, Start Oral Nutrition Supplement  Nutritional Goals: Pt will have po intakes 50% or greater this admission

## 2021-06-03 NOTE — ED PROVIDER NOTES
I independently examined and evaluated Mamie Armenta. All diagnostic, treatment, and disposition decisions were made by myself in conjunction with the BETTY, Jacoby Valdez. For all further details of the patient's emergency department visit, please see their documentation. 65-year-old female with a history of diabetes and hypertension presents with altered mental status. She apparently had her second Covid vaccine yesterday and has not felt well today. Blood glucose was elevated to 544 per EMS. She denies chest pain or shortness of breath. No abdominal pain. Vitals:    06/02/21 1825   BP:    Pulse: 71   Resp: 15   Temp:    SpO2: 97%       On exam the patient is somnolent but easily arousable. Her neurologic exam is nonfocal and she is able to follow commands with all 4 extremities. Strength is symmetric in her upper and lower extremities sensation is intact. Cranial nerves are grossly intact. Negative test of skew, no nystagmus. I did not witness the patient's gait but apparently when they try to get the patient up to walk she was globally weak and could not stand on her own. Heart is regular rate and rhythm, lungs are clear to auscultation bilaterally, abdomen is soft, nontender.         Results for orders placed or performed during the hospital encounter of 06/02/21   CBC Auto Differential   Result Value Ref Range    WBC 9.7 4.0 - 11.0 K/uL    RBC 4.12 4.00 - 5.20 M/uL    Hemoglobin 11.5 (L) 12.0 - 16.0 g/dL    Hematocrit 35.2 (L) 36.0 - 48.0 %    MCV 85.4 80.0 - 100.0 fL    MCH 27.8 26.0 - 34.0 pg    MCHC 32.6 31.0 - 36.0 g/dL    RDW 13.0 12.4 - 15.4 %    Platelets 098 022 - 785 K/uL    MPV 9.6 5.0 - 10.5 fL    Neutrophils % 77.2 %    Lymphocytes % 17.9 %    Monocytes % 4.4 %    Eosinophils % 0.2 %    Basophils % 0.3 %    Neutrophils Absolute 7.5 1.7 - 7.7 K/uL    Lymphocytes Absolute 1.7 1.0 - 5.1 K/uL    Monocytes Absolute 0.4 0.0 - 1.3 K/uL    Eosinophils Absolute 0.0 0.0 - 0.6 K/uL Basophils Absolute 0.0 0.0 - 0.2 K/uL   Comprehensive Metabolic Panel w/ Reflex to MG   Result Value Ref Range    Sodium 132 (L) 136 - 145 mmol/L    Potassium reflex Magnesium 3.6 3.5 - 5.1 mmol/L    Chloride 90 (L) 99 - 110 mmol/L    CO2 30 21 - 32 mmol/L    Anion Gap 12 3 - 16    Glucose 413 (H) 70 - 99 mg/dL    BUN 21 (H) 7 - 20 mg/dL    CREATININE 1.6 (H) 0.6 - 1.2 mg/dL    GFR Non- 33 (A) >60    GFR  39 (A) >60    Calcium 10.4 8.3 - 10.6 mg/dL    Total Protein 9.0 (H) 6.4 - 8.2 g/dL    Albumin 4.2 3.4 - 5.0 g/dL    Albumin/Globulin Ratio 0.9 (L) 1.1 - 2.2    Total Bilirubin 0.3 0.0 - 1.0 mg/dL    Alkaline Phosphatase 239 (H) 40 - 129 U/L    ALT 70 (H) 10 - 40 U/L    AST 49 (H) 15 - 37 U/L    Globulin 4.8 g/dL   Troponin   Result Value Ref Range    Troponin <0.01 <0.01 ng/mL   Urinalysis, reflex to microscopic   Result Value Ref Range    Color, UA Straw Straw/Yellow    Clarity, UA Clear Clear    Glucose, Ur >=1000 (A) Negative mg/dL    Bilirubin Urine Negative Negative    Ketones, Urine Negative Negative mg/dL    Specific Gravity, UA 1.010 1.005 - 1.030    Blood, Urine TRACE-INTACT (A) Negative    pH, UA 6.5 5.0 - 8.0    Protein, UA Negative Negative mg/dL    Urobilinogen, Urine 0.2 <2.0 E.U./dL    Nitrite, Urine Negative Negative    Leukocyte Esterase, Urine Negative Negative    Microscopic Examination YES     Urine Type NotGiven    Blood gas, venous   Result Value Ref Range    pH, Kyle 7.366 7.350 - 7.450    pCO2, Kyle 57.7 (H) 40.0 - 50.0 mmHg    pO2, Kyle 21.2 (L) 25 - 40 mmHg    HCO3, Venous 32.3 (H) 23.0 - 29.0 mmol/L    Base Excess, Kyle 5.5 (H) -3.0 - 3.0 mmol/L    O2 Sat, Kyle 37 Not Established %    Carboxyhemoglobin 1.6 (H) 0.0 - 1.5 %    MetHgb, Kyle 0.8 <1.5 %    TC02 (Calc), Kyle 34 Not Established mmol/L    O2 Content, Kyle 6 Not Established VOL %    O2 Therapy Unknown    Lactate, Sepsis   Result Value Ref Range    Lactic Acid, Sepsis 3.2 (H) 0.4 - 1.9 mmol/L   Microscopic Urinalysis   Result Value Ref Range    WBC, UA 0-2 0 - 5 /HPF    RBC, UA 3-4 0 - 4 /HPF    Bacteria, UA Rare (A) None Seen /HPF   POCT Glucose   Result Value Ref Range    POC Glucose 412 (H) 70 - 99 mg/dl    Performed on ACCU-CHEK    EKG 12 Lead   Result Value Ref Range    Ventricular Rate 71 BPM    Atrial Rate 71 BPM    P-R Interval 156 ms    QRS Duration 78 ms    Q-T Interval 384 ms    QTc Calculation (Bazett) 417 ms    P Axis 47 degrees    R Axis -34 degrees    T Axis 56 degrees    Diagnosis       Normal sinus rhythmLeft axis deviationAbnormal ECGWhen compared with ECG of 18-MAY-2021 19:56,No significant change was found         CT ABDOMEN PELVIS WO CONTRAST Additional Contrast? None   Final Result   No obstructive uropathy is identified. The left kidney is hypertrophied. No   calculi are identified. Right nephrectomy is again noted, with stable   postsurgical bed. Chronic pancreatitis with stable ectasia of the pancreatic duct. Prominence of stool in the colon and rectum, possible constipation. CT Head WO Contrast   Final Result   No acute intracranial abnormality. Chronic sinusitis on the right which is unchanged. XR CHEST PORTABLE   Final Result   Stable chronic changes with no acute abnormality seen. EKG Interpretation    Interpreted by emergency department physician    Rhythm: normal sinus   Rate: normal  Axis: left  Ectopy: none  Conduction: normal  ST Segments: normal  T Waves: normal  Q Waves: none    Clinical Impression: non-specific EKG    Saumya Julian MD    On chart review the patient was admitted on March 5 of this year with a very similar presentation with encephalopathy. She was also found to have lactic acidosis and hypoglycemia. Today her head CT shows no acute findings. CT abdomen pelvis was also done and shows chronic pancreatitis but no acute findings. Chest x-ray is normal.  Lab work shows a mild SHARRON in addition to hyperglycemia.   No evidence of DKA. Urinalysis with no UTI. No leukocytosis on lab work and she is afebrile here. We will repeat her lactic acid after IV fluids.   She is getting IV fluids to treat her hyperglycemia and will be admitted to the hospitalist.    (Please note this note was completed with a voice recognition program.  Efforts were made to edit the dictations but occasionally words are mis-transcribed.)       Arielle Faye MD  06/02/21 0620

## 2021-06-03 NOTE — PROGRESS NOTES
Temp 94.2°F rectally at 0500. Pt covered with warm blankets and heat packs placed around pt. Room temp increased. BG stabilized. Temp rechecked at 0640, 97.4°F orally.

## 2021-06-03 NOTE — ED NOTES
Completed straight catheterization on pt using sterile technique. Bladder emptied and clean urine sample obtained and walked to lab. Pt tolerated well.        Lula Fabian  06/02/21 2046

## 2021-06-03 NOTE — CARE COORDINATION
CASE MANAGEMENT INITIAL ASSESSMENT      Reviewed chart and completed assessment with:patient  Explained Case Management role/services. Primary contact information:[place]    Health Care Decision Maker :   Primary Decision Maker: Almaz De La Torre - Other - [phone]    Secondary Decision Maker (Active): Maurice Fried - Child - [phone]    Supplemental (Other) Decision Maker: Damaris Cunningham - Other - [phone]          Can this person be reached and be able to respond quickly, such as within a few minutes or hours? Yes    Admit date/status:6/2/21  Diagnosis:SHARRON   Is this a Readmission?:  No      Insurance:walter   Precert required for SNF: Yes       3 night stay required: No    Living arrangements, Adls, care needs, prior to admission:lives in apartment alone no steps    Transportation:tbd     Durable Medical Equipment at home:  Walker__Cane__RTS__ BSC__Shower Chair__  02__ HHN__ CPAP__  BiPap__  Hospital Bed__ W/C___ Other__________    Services in the home and/or outpatient, prior to admission:[facility] with [facility]. PT/OT recs:none    Hospital Exemption Notification (HEN):needed for SNF    Barriers to discharge:none    Plan/comments:spoke with patient. Plans on returning home at discharge. Reported lives alone in apartment relies on insurance provided transportation. Would like to resume [facility] with [facility], recently stopped. Spoke with [facility], will see if can  at d/c. Ann Burns, [address] [facility] can accept.  Ann Burns RN    ECOC on chart for MD signature

## 2021-06-03 NOTE — PROGRESS NOTES
Comprehensive Nutrition Assessment    Type and Reason for Visit:  Initial, Positive Nutrition Screen    Nutrition Recommendations/Plan:   1. Continue carb control diet - Diet texture/liquid level per SLP recommendations  2. Add Glucerna BID  3. Encourage po intakes  4. Monitor po intakes, nutrition adequacy, weights, pertinent labs, BMs    Nutrition Assessment:  Consult for unintentional weight loss. Pt with PMH of bipolar, degenerative disc disease, depression, diabetes, gout, hypertension, CKD stage III, neuropathy, chronic pancreatitis presented to the ED for fall and weakness. Currently on a soft and bite sized diet per SLP recommendations + carb restriction. Pt reports that she has a great appetite and sometimes eats double portions. Pt states that she lost ~100 lb during and since last hospital admission. Noted pt weighed 148 lb in June 2020 per EMR (42% loss x 1 year). Pt reports that she has been eating the same. Noted HgbA1c of 15.3. Provided pt on information on sources of carb and meal planning. Pt interested in adding ONS to help with weight gain, will order. Encouraged continued good po intakes.     Malnutrition Assessment:  Malnutrition Status:  Severe malnutrition    Context:  Chronic Illness     Findings of the 6 clinical characteristics of malnutrition:  Energy Intake:  No significant decrease in energy intake  Weight Loss:  7 - Greater than 20% over 1 year     Body Fat Loss:  1 - Mild body fat loss Orbital   Muscle Mass Loss:  7 - Severe muscle mass loss Temples (temporalis), Clavicles (pectoralis & deltoids)  Fluid Accumulation:  No significant fluid accumulation     Strength:  Not Performed    Estimated Daily Nutrient Needs:  Energy (kcal):  9273-0185; Weight Used for Energy Requirements:  Current     Protein (g):  63-73; Weight Used for Protein Requirements:  Ideal        Fluid (ml/day):   ; Method Used for Fluid Requirements:  1 ml/kcal      Nutrition Related Findings:  HgbA1c of 15.3 Wounds:  None       Current Nutrition Therapies:    ADULT DIET;  Dysphagia - Soft and Bite Sized; 5 carb choices (75 gm/meal)  Adult Oral Nutrition Supplement; Diabetic Oral Supplement    Anthropometric Measures:  · Height: 5' 3\" (160 cm)  · Current Body Weight: 86 lb 6.7 oz (39.2 kg)   · Ideal Body Weight: 115 lbs  · BMI: 15.3  · BMI Categories: Underweight (BMI less than 18.5)       Nutrition Diagnosis:   · Severe malnutrition related to inadequate protein-energy intake as evidenced by weight loss greater than or equal to 20% in 1 year    Nutrition Interventions:   Food and/or Nutrient Delivery:  Continue Current Diet, Start Oral Nutrition Supplement  Nutrition Education/Counseling:  No recommendation at this time, Education initiated   Coordination of Nutrition Care:  No recommendation at this time    Goals:  Pt will have po intakes 50% or greater this admission       Nutrition Monitoring and Evaluation:   Behavioral-Environmental Outcomes:  None Identified   Food/Nutrient Intake Outcomes:  Food and Nutrient Intake, Supplement Intake  Physical Signs/Symptoms Outcomes:  Biochemical Data, Weight     Discharge Planning:    Continue current diet, Continue Oral Nutrition Supplement     Electronically signed by Meng Nickerson RD, LD on 6/3/21 at 4:45 PM EDT    Contact: [phone]

## 2021-06-03 NOTE — H&P
Hospital Medicine History & Physical      PCP: Kourtney Willett    Date of Admission: 6/2/2021    Date of Service: Pt seen/examined on 6/2/2021  Pt seen/examined face to face on and admitted as inpatient with expected LOS greater than two midnights due to medical therapy    Chief Complaint:    Chief Complaint   Patient presents with    Hyperglycemia     pt has second covid vaccine yesterday and hasnt felt great since, bs 544 per EMS, pt took insulin today         History Of Present Illness:      57 y.o. female who presented to [facility] with past medical history of bipolar, degenerative disc disease, depression, diabetes, gout, hypertension, CKD stage III, neuropathy, chronic pancreatitis presented to the ED for fall and weakness. Patient was encephalopathic on my evaluation thus history is limited. Patient reported that she is fallen having weakness and lives alone takes care of herself independently but does not know exactly what medication she takes. I spoke with primary decision maker, and patient is normally independent functional but has been noticed to be forgetting some of his medication routinely. Past Medical History:          Diagnosis Date    Abnormal brain MRI 7/20/2017    Partially empty sella and minimal chronic small vessel ischemic disease    Acute bilateral low back pain without sciatica 11/2/2016    SHARRON (acute kidney injury) ([address] 7/5/2017    Arthritis     back    Bipolar disorder ([address] 10/18/2008    CAD (coronary artery disease)     stent placed 6/8/20    Cancer [facility]) 2015    bilateral breast:s/p lumpectomy/radiation:under care care of breast specialist:Dr. Boone     Carotid stenosis, bilateral:<50%:per US 7/2016 7/15/2016    Carpal tunnel syndrome 10/18/2008    Cervical cancer screening 2014    Nml per pt'.     Coronary artery disease of native artery of native heart with stable angina pectoris ([address] 6/9/2020    DDD (degenerative disc disease), lumbar 7/18/2018    Depression     under care of pschiatrist:Dr. 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  Scoliosis     Spondylosis of lumbar region without myelopathy or radiculopathy 3/10/2017    Transient cerebral ischemia 07/15/2016    TIA:7/10/16    Unspecified cerebral artery occlusion with cerebral infarction     TIA       Past Surgical History:          Procedure Laterality Date    BREAST LUMPECTOMY  2015    Bilateral:breast cancer    CARDIAC CATHETERIZATION  06/08/2020    Dr. Paloma Leos), DAYANA to Diag 1    COLONOSCOPY N/A 2/1/2021    COLONOSCOPY DIAGNOSTIC performed by Pk Reid MD at [place] [address]  2/3/2021    CT BONE MARROW BIOPSY 2/3/2021 Ji Shaver [facility] CT SCAN    HYSTERECTOMY      Benign:no cervical cancer per pt'    KIDNEY REMOVAL      right    OTHER SURGICAL HISTORY Right     orif right ankle    TUBAL LIGATION      UPPER GASTROINTESTINAL ENDOSCOPY N/A 1/29/2021    EGD BIOPSY performed by Pk Reid MD at [address] [place] [address]       Medications Prior to Admission:      Prior to Admission medications    Medication Sig Start Date End Date Taking?  Authorizing Provider   Blood Glucose Monitoring Suppl (TRUE METRIX METER) w/Device KIT Use daily for blood sugar check 6/2/21   Lindsey Bentley MD   chlorthalidone (HYGROTON) 25 MG tablet Take 1 tablet by mouth daily 5/21/21   Uriel Vera MD Historical Provider, MD   glucagon 1 MG injection Inject 1 mg into the muscle See Admin Instructions Follow package directions for low blood sugar. 2/19/19   Danya He MD   aspirin 81 MG tablet Take 81 mg by mouth daily    Historical Provider, MD   gabapentin (NEURONTIN) 600 MG tablet Take 600 mg by mouth 3 times daily    Historical Provider, MD   traZODone (DESYREL) 100 MG tablet Take 100 mg by mouth nightly    Historical Provider, MD       Allergies:  Morphine, Penicillins, Codeine, and Penicillin g    Social History:          TOBACCO:   reports that she quit smoking about 6 years ago. Her smoking use included cigarettes and cigars. She has a 10.00 pack-year smoking history. She has never used smokeless tobacco.  ETOH:   reports no history of alcohol use.   E-Cigarettes/Vaping Use     Questions Responses    E-Cigarette/Vaping Use Never User    Start Date     Passive Exposure     Quit Date     Counseling Given     Comments             Family History:      Reviewed in detail         Problem Relation Age of Onset    Cancer Mother         breast    Cancer Father     Heart Failure Neg Hx     High Cholesterol Neg Hx     Hypertension Neg Hx     Migraines Neg Hx     Rashes/Skin Problems Neg Hx     Seizures Neg Hx     Stroke Neg Hx     Thyroid Disease Neg Hx     Diabetes Neg Hx        REVIEW OF SYSTEMS:   Limited accuracy due to patient being encephalopathic    Constitutional:  No Fever, No Chills, No Night Sweats  ENT/Mouth:  No Nasal Congestion,  No Hoarseness, No new mouth lesion  Eyes:  No Eye Pain, No Redness, No Discharge  Cardiovascular:  No Chest Pain, No Orthopnea, No Palpitations  Respiratory:  No Cough, No Sputum, No Dyspnea  Gastrointestinal: No Vomiting, No Diarrhea, No abdominal pain  Genitourinary: No Urinary Frequency, No Hematuria, No Urinary pain  Musculoskeletal:  No worsening Arthralgias, No worsening Myalgias  Skin:  No new Skin Lesions, No new skin rash  Neuro: + Generalized

## 2021-06-03 NOTE — PROGRESS NOTES
BG 49 at 0453. 8 oz of orange juice given. BG rechecked at 0515, came up to 68. 8 oz of orange juice given. BG rechecked at 0538, up to 138.

## 2021-06-04 VITALS
HEIGHT: 63 IN | HEART RATE: 82 BPM | BODY MASS INDEX: 20.36 KG/M2 | RESPIRATION RATE: 20 BRPM | OXYGEN SATURATION: 100 % | SYSTOLIC BLOOD PRESSURE: 123 MMHG | WEIGHT: 114.9 LBS | DIASTOLIC BLOOD PRESSURE: 60 MMHG | TEMPERATURE: 99 F

## 2021-06-04 LAB
A/G RATIO: 0.8 (ref 1.1–2.2)
ALBUMIN SERPL-MCNC: 3.1 G/DL (ref 3.4–5)
ALP BLD-CCNC: 171 U/L (ref 40–129)
ALT SERPL-CCNC: 38 U/L (ref 10–40)
ANION GAP SERPL CALCULATED.3IONS-SCNC: 10 MMOL/L (ref 3–16)
AST SERPL-CCNC: 26 U/L (ref 15–37)
BASOPHILS ABSOLUTE: 0 K/UL (ref 0–0.2)
BASOPHILS RELATIVE PERCENT: 0.8 %
BILIRUB SERPL-MCNC: 0.5 MG/DL (ref 0–1)
BUN BLDV-MCNC: 13 MG/DL (ref 7–20)
CALCIUM SERPL-MCNC: 9.1 MG/DL (ref 8.3–10.6)
CHLORIDE BLD-SCNC: 102 MMOL/L (ref 99–110)
CO2: 26 MMOL/L (ref 21–32)
CREAT SERPL-MCNC: 1.2 MG/DL (ref 0.6–1.2)
EOSINOPHILS ABSOLUTE: 0.1 K/UL (ref 0–0.6)
EOSINOPHILS RELATIVE PERCENT: 1.9 %
GFR AFRICAN AMERICAN: 55
GFR NON-AFRICAN AMERICAN: 45
GLOBULIN: 3.8 G/DL
GLUCOSE BLD-MCNC: 103 MG/DL (ref 70–99)
GLUCOSE BLD-MCNC: 109 MG/DL (ref 70–99)
GLUCOSE BLD-MCNC: 136 MG/DL (ref 70–99)
GLUCOSE BLD-MCNC: 297 MG/DL (ref 70–99)
HCT VFR BLD CALC: 31 % (ref 36–48)
HEMOGLOBIN: 10.2 G/DL (ref 12–16)
LYMPHOCYTES ABSOLUTE: 2.5 K/UL (ref 1–5.1)
LYMPHOCYTES RELATIVE PERCENT: 44.8 %
MAGNESIUM: 1.7 MG/DL (ref 1.8–2.4)
MCH RBC QN AUTO: 28.1 PG (ref 26–34)
MCHC RBC AUTO-ENTMCNC: 32.8 G/DL (ref 31–36)
MCV RBC AUTO: 85.8 FL (ref 80–100)
MONOCYTES ABSOLUTE: 0.5 K/UL (ref 0–1.3)
MONOCYTES RELATIVE PERCENT: 8.6 %
NEUTROPHILS ABSOLUTE: 2.4 K/UL (ref 1.7–7.7)
NEUTROPHILS RELATIVE PERCENT: 43.9 %
PDW BLD-RTO: 12.7 % (ref 12.4–15.4)
PERFORMED ON: ABNORMAL
PLATELET # BLD: 128 K/UL (ref 135–450)
PMV BLD AUTO: 10.1 FL (ref 5–10.5)
POTASSIUM REFLEX MAGNESIUM: 3.4 MMOL/L (ref 3.5–5.1)
RBC # BLD: 3.62 M/UL (ref 4–5.2)
SODIUM BLD-SCNC: 138 MMOL/L (ref 136–145)
TOTAL PROTEIN: 6.9 G/DL (ref 6.4–8.2)
WBC # BLD: 5.6 K/UL (ref 4–11)

## 2021-06-04 PROCEDURE — 97110 THERAPEUTIC EXERCISES: CPT

## 2021-06-04 PROCEDURE — G0378 HOSPITAL OBSERVATION PER HR: HCPCS

## 2021-06-04 PROCEDURE — 83735 ASSAY OF MAGNESIUM: CPT

## 2021-06-04 PROCEDURE — 36415 COLL VENOUS BLD VENIPUNCTURE: CPT

## 2021-06-04 PROCEDURE — 6370000000 HC RX 637 (ALT 250 FOR IP): Performed by: INTERNAL MEDICINE

## 2021-06-04 PROCEDURE — 85025 COMPLETE CBC W/AUTO DIFF WBC: CPT

## 2021-06-04 PROCEDURE — 97165 OT EVAL LOW COMPLEX 30 MIN: CPT

## 2021-06-04 PROCEDURE — 80053 COMPREHEN METABOLIC PANEL: CPT

## 2021-06-04 PROCEDURE — 97535 SELF CARE MNGMENT TRAINING: CPT

## 2021-06-04 PROCEDURE — 6360000002 HC RX W HCPCS: Performed by: INTERNAL MEDICINE

## 2021-06-04 PROCEDURE — 6370000000 HC RX 637 (ALT 250 FOR IP): Performed by: NURSE PRACTITIONER

## 2021-06-04 PROCEDURE — 96374 THER/PROPH/DIAG INJ IV PUSH: CPT

## 2021-06-04 RX ORDER — LISINOPRIL 40 MG/1
20 TABLET ORAL DAILY
Qty: 90 TABLET | Refills: 1
Start: 2021-06-04 | End: 2021-11-17

## 2021-06-04 RX ORDER — GABAPENTIN 600 MG/1
300 TABLET ORAL 2 TIMES DAILY
Qty: 90 TABLET | Refills: 3
Start: 2021-06-04 | End: 2021-11-22

## 2021-06-04 RX ORDER — POTASSIUM CHLORIDE 20 MEQ/1
40 TABLET, EXTENDED RELEASE ORAL ONCE
Status: COMPLETED | OUTPATIENT
Start: 2021-06-04 | End: 2021-06-04

## 2021-06-04 RX ADMIN — PALIPERIDONE 9 MG: 3 TABLET, EXTENDED RELEASE ORAL at 08:38

## 2021-06-04 RX ADMIN — INSULIN LISPRO 6 UNITS: 100 INJECTION, SOLUTION INTRAVENOUS; SUBCUTANEOUS at 12:15

## 2021-06-04 RX ADMIN — POTASSIUM CHLORIDE 40 MEQ: 1500 TABLET, EXTENDED RELEASE ORAL at 14:09

## 2021-06-04 RX ADMIN — POTASSIUM CHLORIDE 10 MEQ: 7.46 INJECTION, SOLUTION INTRAVENOUS at 10:47

## 2021-06-04 RX ADMIN — PANTOPRAZOLE SODIUM 40 MG: 40 TABLET, DELAYED RELEASE ORAL at 08:33

## 2021-06-04 RX ADMIN — TICAGRELOR 90 MG: 90 TABLET ORAL at 08:32

## 2021-06-04 RX ADMIN — ASPIRIN 81 MG: 81 TABLET, CHEWABLE ORAL at 08:33

## 2021-06-04 RX ADMIN — OXYCODONE HYDROCHLORIDE AND ACETAMINOPHEN 1 TABLET: 7.5; 325 TABLET ORAL at 11:58

## 2021-06-04 RX ADMIN — ATORVASTATIN CALCIUM 20 MG: 10 TABLET, FILM COATED ORAL at 08:33

## 2021-06-04 RX ADMIN — INSULIN GLARGINE 25 UNITS: 100 INJECTION, SOLUTION SUBCUTANEOUS at 08:37

## 2021-06-04 ASSESSMENT — PAIN SCALES - GENERAL
PAINLEVEL_OUTOF10: 8

## 2021-06-04 ASSESSMENT — PAIN DESCRIPTION - PAIN TYPE
TYPE: CHRONIC PAIN
TYPE: CHRONIC PAIN

## 2021-06-04 ASSESSMENT — PAIN DESCRIPTION - LOCATION: LOCATION: BACK

## 2021-06-04 NOTE — PROGRESS NOTES
Pt alert and orientated. Call light within reach. Pt up in chair with assistance. Miriam Fairbanks RN

## 2021-06-04 NOTE — PROGRESS NOTES
Pt assessment completed and charted. VSS. Pt a/o. Pt ambulates SB assist w/ walker. Pt c/o back pain. Bed in lowest position and wheels locked. Call light within reach. Bedside table within reach. Non-skid footwear in place. Pt denies any other needs at this time. Pt calls out appropriately. Will continue to monitor.

## 2021-06-04 NOTE — PROGRESS NOTES
Shift assessment completed per documentation. Pt A&Ox4. VSS. Pt awake in bed. Pt c/o chronic back pain 9/10. Bilateral lung sounds diminished. Abdomen soft and flat. Bowel sounds active. Bed locked and in lowest position. Nonskid slippers on. Side rails up 2/4. Call light and personal belongings within reach. Will continue to monitor.

## 2021-06-04 NOTE — DISCHARGE INSTR - COC
Continuity of Care Form    Patient Name: Farzana Ospina   :  1960  MRN:  [id number]    Admit date:  2021  Discharge date:  2021    Code Status Order: Full Code   Advance Directives:   Advance Care Flowsheet Documentation       Date/Time Healthcare Directive Type of Healthcare Directive Copy in [address] Agent's Name Healthcare Agent's Phone Number    [phone]  No, patient does not have an advance directive for healthcare treatment -- -- -- -- --            Admitting Physician:  Nick Edwards DO  PCP: Nehemias Rodriguez    Discharging Nurse: [address] [place] [address] Unit/Room#: 3393/2422-56  Discharging Unit Phone Number: [phone]    Emergency Contact:   Extended Emergency Contact Information  Primary Emergency Contact: Christianne Fiore [facility] [address]  Phone: [phone]  Work Phone: [phone]  Mobile Phone: [phone]  Relation: Child  Secondary Emergency Contact: Zenaida Oshea [facility] Phone: [phone]  Mobile Phone: [phone]  Relation: Other    Past Surgical History:  Past Surgical History:   Procedure Laterality Date    BREAST LUMPECTOMY      Bilateral:breast cancer    CARDIAC CATHETERIZATION  2020    Dr. Tiana Osullivan), DAYANA to Diag 1    COLONOSCOPY N/A 2021    COLONOSCOPY DIAGNOSTIC performed by Brian Acosta MD at [place] [address]  2/3/2021    CT BONE MARROW BIOPSY 2/3/2021 Ceasar Garcia MD [facility] CT SCAN    HYSTERECTOMY      Benign:no cervical cancer per pt'    KIDNEY REMOVAL      right    OTHER SURGICAL HISTORY Right     orif right ankle    TUBAL LIGATION      UPPER GASTROINTESTINAL ENDOSCOPY N/A 2021    EGD BIOPSY performed by Brian Acosta MD at [address] [place] [address] ENDOSCOPY       Immunization History:   Immunization History   Administered Date(s) Administered    Influenza, MDCK Quadv, IM, PF (Flucelvax 4 yrs and older) 2017    Influenza, Quadv, IM, PF (6 mo and older Fluzone, Flulaval, Fluarix, and 3 yrs and older Afluria) 10/11/2016, 01/23/2020    Tdap (Boostrix, Adacel) 10/07/2020       Active Problems:  Patient Active Problem List   Diagnosis Code    Acute hyperglycemia R73.9    Essential hypertension I10    Bilateral malignant neoplasm of breast in female ([facility] C50.911, C50.912    Microalbuminuria R80.9    Obesity (BMI 30-39. 9) E66.9    Slurred speech R47.81    Hx of ischemic CVA I63.40    Brain metastases ([facility]) C79.31    Carotid stenosis, bilateral:<50%:per US 7/2016 I65.23    SHARRON (acute kidney injury) ([facility]) N17.9    Lactic acidosis E87.2    Frequent falls R29.6    Depression/anxiety F41.8    Abnormal brain MRI R90.89    Acute bilateral low back pain without sciatica M54.5    Uncontrolled type 2 diabetes mellitus with microalbuminuria, with long-term current use of insulin ([facility]) E11.29, E11.65, R80.9, Z79.4    Closed fracture of right ankle, with routine healing, subsequent encounter S82.891D    CKD (chronic kidney disease), stage III N18.30    Uncontrolled type 2 diabetes mellitus with diabetic nephropathy, with long-term current use of insulin ([facility]) E11.21, E11.65, Z79.4    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 ([facility] E11.59    Dyslipidemia associated with type 2 diabetes mellitus ([facility] E11.69, E78.5    Bipolar disorder ([facility]) F31.9    Cardiomegaly I51.7    Cardiomyopathy ([facility] I42.9    Carpal tunnel syndrome G56.00    Chronic systolic heart failure ([facility]) I50.22    Diffuse cystic mastopathy N60.19    Edema R60.9    Gallstone pancreatitis K85.10    Gout M10.9    History of bilateral breast cancer Z85.3    History of renal cell carcinoma Z85.528    Cardiomyopathy in other diseases classified elsewhere I43    Mononeuritis G58.9    Myalgia and myositis [id number]    Nonspecific abnormal electrocardiogram (ECG) (EKG) R94.31    Patient in clinical research study Z00.6    Peroneal muscular atrophy G60.0    Scoliosis (and kyphoscoliosis), idiopathic M41.20    SOBOE (shortness of breath on exertion) R06.02    Syncope and collapse R55    Chronic pain disorder G89.4    DDD (degenerative disc disease), lumbar M51.36    Diabetic polyneuropathy associated with type 2 diabetes mellitus ([facility]) E11.42    Other secondary scoliosis, lumbosacral region M41.57    Thoracic spondylosis without myelopathy M47.814    Morbid obesity due to excess calories ([facility]) E66.01    Age-related nuclear cataract of both eyes H25.13    Hypermetropia, bilateral H52.03    Hypertensive retinopathy, bilateral H35.033    Vitreous degeneration, bilateral H43.813    Acute bilateral low back pain with left-sided sciatica M54.42    History of CVA (cerebrovascular accident) without residual deficits Z86.73    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 stage 3 chronic kidney disease ([facility]) I13.0, I50.22, N18.30    S/P mastectomy, right Z90.11    Acute cystitis without hematuria N30.00    Hypomagnesemia E83.42    Chest pain R07.9    CAD (coronary artery disease) I25.10    DKA (diabetic ketoacidoses) ([facility]) E11.10    DM (diabetes mellitus), type 2, uncontrolled with complications ([facility]) V43.5, E11.65    Hx of heart artery stent Z95.5    Nuclear senile cataract H25.10    Unintentional weight loss R63.4    Anemia D64.9    Facial abscess L02.01    Asymptomatic bacteriuria R82.71    Acute encephalopathy G93.40    Tobacco use disorder F17.200    Severe malnutrition ([facility]) E43       Isolation/Infection:   Isolation            No Isolation          Patient Infection Status       Infection Onset Added Last Indicated Last Indicated By Review Planned Expiration Resolved Resolved By    None active    Resolved    COVID-19 Rule Out 02/05/21 02/05/21 02/05/21 COVID-19 (Ordered)   02/05/21 Rule-Out Test Resulted    COVID-19 Rule Out 01/28/21 01/28/21 01/28/21 COVID-19 (Ordered)   01/28/21 Rule-Out Test Resulted    C-diff Rule Out 01/26/21 01/26/21 01/27/21 Clostridium difficile toxin/antigen (Ordered)   01/27/21 Rule-Out Test Resulted    COVID-19 Rule Out 11/11/20 11/11/20 11/11/20 COVID-19 (Ordered)   11/12/20 Rule-Out Test Resulted    COVID-19 Rule Out 06/07/20 06/07/20 06/07/20 COVID-19 (Ordered)   06/07/20 Onofre Mac RN            Nurse Assessment:  Last Vital Signs: /60   Pulse 82   Temp 99 °F (37.2 °C) (Oral)   Resp 20   Ht 5' 3\" (1.6 m)   Wt 114 lb 14.4 oz (52.1 kg)   SpO2 100%   BMI 20.35 kg/m²     Last documented pain score (0-10 scale): Pain Level: 8  Last Weight:   Wt Readings from Last 1 Encounters:   06/04/21 114 lb 14.4 oz (52.1 kg)     Mental Status:  oriented    IV Access:  - None    Nursing Mobility/ADLs:  Walking   Independent  Transfer  Independent  Bathing  Independent  Dressing  Independent  Toileting  Independent  Feeding  Independent  Med Admin  Independent  Med Delivery   whole    Wound Care Documentation and Therapy:        Elimination:  Continence:   · Bowel: Yes  · Bladder: Yes  Urinary Catheter: None   Colostomy/Ileostomy/Ileal Conduit: No       Date of Last BM: 6/4    Intake/Output Summary (Last 24 hours) at 6/4/2021 1251  Last data filed at 6/4/2021 1237  Gross per 24 hour   Intake 1030 ml   Output 0 ml   Net 1030 ml     I/O last 3 completed shifts: In: 1304 [P. O.:600; I.V.:704]  Out: 0     Safety Concerns: At Risk for Falls    Impairments/Disabilities:      None    Nutrition Therapy:  Current Nutrition Therapy:   - Oral Diet:  Renal    Routes of Feeding: Oral  Liquids: Thin Liquids  Daily Fluid Restriction: no  Last Modified Barium Swallow with Video (Video Swallowing Test): not done    Treatments at the Time of Hospital Discharge:   Respiratory Treatments: ***  Oxygen Therapy:  is not on home oxygen therapy.   Ventilator:    - No ventilator support    Rehab Therapies: Physical Therapy and Occupational Therapy  Weight Bearing Status/Restrictions: No weight bearing restirctions  Other Medical Equipment (for information only, NOT a DME order):  walker  Other Treatments: ***    Patient's personal belongings (please select all that are sent with patient):  Glasses, Dentures {DESC; UPPER/LOWER/BOTH:[id number]}    RN SIGNATURE:  Electronically signed by Miah Yañez RN on 6/4/21 at 1:38 PM EDT    CASE MANAGEMENT/SOCIAL WORK SECTION    Inpatient Status Date: 6/4/21    Readmission Risk Assessment Score:  Readmission Risk              Risk of Unplanned Readmission:  0           Discharging to Facility/ Agency   · Name: [facility]  · Address:  · Phone:[phone]  · PDA:[phone]  ·     / signature: Electronically signed by Josh Gonzalez RN on 6/4/21 at 1:19 PM EDT    PHYSICIAN SECTION    Prognosis: Good    Condition at Discharge: Stable    Rehab Potential (if transferring to Rehab): Good    Recommended Labs or Other Treatments After Discharge: Resume Home health care services    Physician Certification: I certify the above information and transfer of Shahid Gunter  is necessary for the continuing treatment of the diagnosis listed and that she requires Home Care for greater 30 days.      Update Admission H&P: No change in H&P    PHYSICIAN SIGNATURE:  Electronically signed by Jasmina Rooney MD on 6/4/21 at 12:52 PM EDT

## 2021-06-04 NOTE — PROGRESS NOTES
Occupational Therapy   Occupational Therapy Initial Assessment/Treatment   Date: 2021   Patient Name: Mamie Armenta  MRN: [id number]     : 1960    Date of Service: 2021    Discharge Recommendations:  24 hour supervision or assist  OT Equipment Recommendations  Equipment Needed: No    Assessment   Performance deficits / Impairments: Decreased functional mobility ; Decreased safe awareness;Decreased balance;Decreased ADL status; Decreased cognition;Decreased endurance    Assessment: Pt 58 yo female functioning with deficits in the areas listed above following increased falls at home. Pt reports IND PLOF and has good support. Pt demo'd fair balance and stability requiring the RW and typically does not use an AD. Pt required CGA for functional mobility and LB dressing. Pt educated on BUE exercises for increased endurance and strength. Pt would benefit from skilled OT services while in acute care prior to d/c home. Disease Specific Education: Pt educated on importance of OOB mobility, prevention of complications of bedrest, and general safety during hospitalization. Pt verbalized understanding    Prognosis: Good  Decision Making: Low Complexity  OT Education: OT Role;Plan of Care;Home Exercise Program;Precautions; ADL Adaptive Strategies; Energy Conservation  REQUIRES OT FOLLOW UP: Yes  Activity Tolerance  Activity Tolerance: Patient Tolerated treatment well;Patient limited by fatigue  Activity Tolerance: /76 HR 95 O2 98%  Safety Devices  Safety Devices in place: Yes  Type of devices: Bed alarm in place;Call light within reach;Nurse notified;Gait belt;Left in bed           Patient Diagnosis(es): The primary encounter diagnosis was SHARRON (acute kidney injury) ([facility] Diagnoses of Elevated transaminase level, Hyperglycemia, Lactic acidosis, and Encephalopathy were also pertinent to this visit.      has a past medical history of Abnormal brain MRI, Acute bilateral low back pain without sciatica, SHARRON (acute kidney injury) ([facility] Arthritis, Bipolar disorder ([facility] CAD (coronary artery disease), Cancer ([facility] Carotid stenosis, bilateral:<50%:per US 7/2016, Carpal tunnel syndrome, Cervical cancer screening, Coronary artery disease of native artery of native heart with stable angina pectoris ([facility] DDD (degenerative disc disease), lumbar, Depression, Depression/anxiety, Depression/anxiety, Diabetes mellitus ([facility] Gout, History of mammogram, Hyperlipidemia, Hypertension, 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 stage 3 chronic kidney disease ([facility] Microalbuminuria, Neuropathy in diabetes ([facility] Non morbid obesity, Pancreatitis, S/P endoscopy, Scoliosis, Spondylosis of lumbar region without myelopathy or radiculopathy, Transient cerebral ischemia, and Unspecified cerebral artery occlusion with cerebral infarction. has a past surgical history that includes Kidney removal; Hysterectomy; Breast lumpectomy (2015); Tubal ligation; other surgical history (Right); Cardiac catheterization (06/08/2020); Upper gastrointestinal endoscopy (N/A, 1/29/2021); Colonoscopy (N/A, 2/1/2021); and CT BIOPSY BONE MARROW (2/3/2021).            Restrictions  Restrictions/Precautions  Restrictions/Precautions: General Precautions, Fall Risk  Position Activity Restriction  Other position/activity restrictions: up with assistance    Subjective   General  Chart Reviewed: Yes  Patient assessed for rehabilitation services?: Yes  Family / Caregiver Present: No  Referring Practitioner: Miriam Schrader MD  Diagnosis: falls  Subjective  Subjective: Pt agreeable to therapy  General Comment  Comments: RN approved therapy  Patient Currently in Pain: Yes  Pain Assessment  Pain Assessment: 0-10  Pain Level: 8  Pain Type: Chronic pain  Pain Location: Back  Non-Pharmaceutical Pain Intervention(s): Repositioned  Vital Signs  Temp: 99 °F (37.2 °C)  Temp Source: Oral  Pulse: 82  Heart Rate Source: Monitor  Resp: ROM/Therapeutic Exercise: AROM  Comment: BUE  Exercises  Shoulder Flexion: x15  Elbow Flexion: x15  Elbow Extension: x15  Supination: x15  Pronation: x15  Wrist Flexion: x15  Wrist Extension: x15  Grasp/Release: x15     LUE AROM (degrees)  LUE AROM : WFL  RUE AROM (degrees)  RUE AROM : WFL  LUE Strength  Gross LUE Strength: WFL  L Hand General: 4/5  RUE Strength  Gross RUE Strength: WFL  R Hand General: 4/5                   Plan   Plan  Times per week: 3-5x/wk  Current Treatment Recommendations: Strengthening, Endurance Training, Balance Training, Functional Mobility Training, Safety Education & Training, Self-Care / ADL                  AM-PAC Score        AM-PAC Inpatient Daily Activity Raw Score: 20 (06/04/21 0912)  AM-PAC Inpatient ADL T-Scale Score : 42.03 (06/04/21 0912)  ADL Inpatient CMS 0-100% Score: 38.32 (06/04/21 0912)  ADL Inpatient CMS G-Code Modifier : Rondall Forward (06/04/21 0912)    Goals  Short term goals  Time Frame for Short term goals: 4 days (6/08/21)  Short term goal 1: pt will complete LB dressing mod I. Short term goal 2: Pt will complete toilet transfer with S.  Short term goal 3: Pt will complete 20 reps of BUE exercises for increased endurance. Patient Goals   Patient goals : \"to get stronger\"       Therapy Time   Individual Concurrent Group Co-treatment   Time In 0840         Time Out 0915         Minutes 35         Timed Code Treatment Minutes: 25 Minutes (10 minutes for evaluation)       ELENA De La Paz/L    If pt is unable to be seen after this session, please let this note serve as discharge summary. Please see case management note for discharge disposition. Thank you.

## 2021-06-04 NOTE — PROGRESS NOTES
Physical Therapy  Pt up in the chair upon PT entry. Pt denies any mobility issues and has a walker at home. OT also stated pt was SBA-Mod I. Pt with DC orders, PT to sign off at this time.      Eladia Post, PT, DPT

## 2021-06-04 NOTE — CARE COORDINATION
CASE MANAGEMENT DISCHARGE SUMMARY      Discharge to: home with [facility]      Transportation: private       Confirmed discharge plan with:     Patient: yes     Family, name and contact number: here      Facility/Agency, name:  [facility] faxed  [phone]       RN, name:  Steven Mccarty RN

## 2021-06-04 NOTE — PROGRESS NOTES
Notified Jamin Zamora NP via [employer] at 2399, [email or web address]. admitted 6/2 with SHARRON. Pt accidentally pulled out IV. Very hard stick. No IV fluids or abx. Possibly d/c today. OK to leave out? Thank you. \"    Reply via [employer] at [phone], \"Yes.  Thank you\"

## 2021-06-05 NOTE — DISCHARGE SUMMARY
Hospital Medicine Discharge Summary    Patient ID: Tara Brooks      Patient's PCP: Nery Dodge Date: 6/2/2021     Discharge Date: 6/4/2021      Admitting Physician: oYcasta Deluca DO     Discharge Physician: Carmina Arce MD     Discharge Diagnoses: Active Hospital Problems    Diagnosis     Severe malnutrition ([facility] [address] [E43]     SHARRON (acute kidney injury) ([facility] [address] [N17.9]        The patient was seen and examined on day of discharge and this discharge summary is in conjunction with any daily progress note from day of discharge. Hospital Course:   57 y. o. female who presented to [facility] with past medical history of bipolar, degenerative disc disease, depression, diabetes, gout, hypertension, CKD stage III, neuropathy, chronic pancreatitis presented to the ED for fall and weakness.     Patient was encephalopathic on presentation thus history is limited.  Patient was reported that she had fallen, and was having weakness. At baseline she lives alone, and takes care of herself independently. She does not know exactly what medication she takes.     Primary decision maker, reported patient is normally independent, functional, but has been noticed to be forgetting some of her medication routinely. Patient was placed to the Hospitalist service for further medical care and evaluation       Physical Exam Performed:     /60   Pulse 82   Temp 99 °F (37.2 °C) (Oral)   Resp 20   Ht 5' 3\" (1.6 m)   Wt 114 lb 14.4 oz (52.1 kg)   SpO2 100%   BMI 20.35 kg/m²   General appearance:  no acute distress, appears older than stated age  HEENT:   atraumatic, sclera anicteric, Conjunctivae clear. Neck: Supple,Trachea midline, no goiter  Respiratory:no accessory muscle usage, Normal respiratory effort.  Clear to auscultation, bilaterally without wheezing  Cardiovascular:  Regular rate and rhythm, capillary refill 2 seconds  Abdomen: Soft, non-tender, non-distended with normal bowel sounds. Musculoskeletal:  No clubbing, cyanosis. trace edema LE bilaterally. Tender on palpation to lumbar spine. No masses appreciated. No bony abnormalities appreciated. Noted previous surgical incision to R paraspinal region. Skin: turgor normal.  No new rashes or lesions. Neurologic: Alert and oriented x3, pleasant and conversant. No motor or sensory changes. Labs: For convenience and continuity at follow-up the following most recent labs are provided:      CBC:    Lab Results   Component Value Date    WBC 5.6 06/04/2021    HGB 10.2 06/04/2021    HCT 31.0 06/04/2021     06/04/2021       Renal:    Lab Results   Component Value Date     06/04/2021    K 3.4 06/04/2021     06/04/2021    CO2 26 06/04/2021    BUN 13 06/04/2021    CREATININE 1.2 06/04/2021    CALCIUM 9.1 06/04/2021    PHOS 2.9 03/06/2021         Significant Diagnostic Studies    Radiology:   MRI LUMBAR SPINE WO CONTRAST   Preliminary Result   No significant spinal canal or foraminal stenosis. Severe chronic right dorsal paraspinal muscular atrophy from T12 to L4. CT ABDOMEN PELVIS WO CONTRAST Additional Contrast? None   Final Result   No obstructive uropathy is identified. The left kidney is hypertrophied. No   calculi are identified. Right nephrectomy is again noted, with stable   postsurgical bed. Chronic pancreatitis with stable ectasia of the pancreatic duct. Prominence of stool in the colon and rectum, possible constipation. CT Head WO Contrast   Final Result   No acute intracranial abnormality. Chronic sinusitis on the right which is unchanged. XR CHEST PORTABLE   Final Result   Stable chronic changes with no acute abnormality seen. ASSESSMENT AND PLAN            Active Hospital Problems     Diagnosis Date Noted    SHARRON (acute kidney injury) ([facility] [address] [N17.9] 07/05/2017      Acute encephalopathy complicated by generalized weakness and frequent falls. Multifactorial, suspect SHARRON, meds SHIVANI, THC   -Resolved mentation to baseline.   - CT head negative  - MRI spine : No significant spinal canal or foraminal stenosis. - Labs unremarkable  - UDS positive for THC and oxycodone. Patient states that she took 3 edible THC. I clinically favor that THC caused acute MS changes.      Uncontrolled type 2 diabetes with hyperglycemia:   - Blood glucose 400+ with no anion gap  - Meds: Lantus  - HGBA1C 15.3 % on 6/3/21  - POC BG QAC/HS  - Diet: Low concentrated CARBS  - Recommend outpt FU with PCP to optimize glycemic control.     Acute kidney injury:   - Patient has one kidney in her medical history. S/P R nephrectomy from Wilms tumor during childhood. - Baseline Cr 0.9-1.2  - Continue Lisinopril, Discontinue Chlothalidone,   -Avoid unnecessary nephrotxiins  -Encourage nutrition and fluid intake.      Elevated ALP:  -Improving with fluids  -Labs: Trend LFT  - GGT elevated at 331  -Judicious use of liver toxic meds, closely monitor     Normocytic anemia:  - Labs: 2/21 iron ( low range), Vit B12 and folate in range.    - Hemoccult ordered, no sample given in hospital.   - Encourage nutrition     Malnutrition calorie:  -Nutrition consultation, poor po intake and overall appetite  -Suspect complicated by Chronic pancreatitis      Chronic medical conditions:  Essential Hypertension: Continue home medication  Hyperlipidemia: Continue home medication  Chronic pancreatitis: Continue to monitor  Depression: continue home medications            Consults:     IP CONSULT TO HOSPITALIST  IP CONSULT TO DIETITIAN  IP CONSULT TO HOME CARE NEEDS    Disposition:  Home     Condition at Discharge: Stable    Discharge Instructions/Follow-up:  PCP 1 week,    Code Status:  Prior     Activity: activity as tolerated    Diet: diabetic diet      Discharge Medications:     Discharge Medication List as of 6/4/2021  1:42 PM           Details   gabapentin (NEURONTIN) 600 MG tablet Take 0.5 tablets by mouth 2 times daily for 3 days. , Disp-90 tablet, R-3NO PRINT      lisinopril (PRINIVIL;ZESTRIL) 40 MG tablet Take 0.5 tablets by mouth daily, Disp-90 tablet, R-1NO PRINT              Details   atorvastatin (LIPITOR) 20 MG tablet Take 1 tablet by mouth daily, Disp-30 tablet, R-5Normal      paliperidone (INVEGA) 9 MG extended release tablet Take 9 mg by mouth every morning Historical Med      pantoprazole (PROTONIX) 40 MG tablet Take 40 mg by mouth daily Historical Med      ferrous sulfate (IRON 325) 325 (65 Fe) MG tablet Take 325 mg by mouth daily (with breakfast)Historical Med      oxyCODONE-acetaminophen (PERCOCET) 7.5-325 MG per tablet TAKE 1 TABLET BY MOUTH EVERY 6 (SIX) HOURS AS NEEDED FOR UP TO 28 DAYS. Historical Med      clonazePAM (KLONOPIN) 0.5 MG tablet Take 0.5 mg by mouth 3 times daily as needed. Historical Med      ticagrelor (BRILINTA) 90 MG TABS tablet Take 1 tablet by mouth 2 times daily, Disp-60 tablet, R-1Print      insulin glargine (LANTUS SOLOSTAR) 100 UNIT/ML injection pen Inject 25 Units into the skin every morning, Disp-1 pen,R-1Normal      nitroGLYCERIN (NITROSTAT) 0.4 MG SL tablet up to max of 3 total doses. If no relief after 1 dose, call 911., Disp-25 tablet,R-3Normal      cetirizine (ZYRTEC) 10 MG tablet Take 10 mg by mouth dailyHistorical Med      calcium carbonate-vitamin D (CALTRATE) 600-400 MG-UNIT TABS per tab Take 1 tablet by mouth daily Historical Med      aspirin 81 MG tablet Take 81 mg by mouth dailyHistorical Med      traZODone (DESYREL) 100 MG tablet Take 100 mg by mouth nightly             Time Spent on discharge is more than 45 minutes in the examination, evaluation, counseling and review of medications and discharge plan. Signed:    Segundo Madrid MD   6/4/2021      Thank you Lane Holder for the opportunity to be involved in this patient's care. If you have any questions or concerns please feel free to contact me at [phone].

## 2021-06-09 ENCOUNTER — TELEPHONE (OUTPATIENT)
Dept: ENDOCRINOLOGY | Age: 61
End: 2021-06-09

## 2021-06-09 NOTE — TELEPHONE ENCOUNTER
True metrix meter go not covered by [facility]. Alternatives are : True Metrix or true metrix air, onetouch ultra mini, one touch verio or verio reflect.      True Metrix Meter sent

## 2021-06-15 ENCOUNTER — OFFICE VISIT (OUTPATIENT)
Dept: ENDOCRINOLOGY | Age: 61
End: 2021-06-15
Payer: MEDICAID

## 2021-06-15 VITALS
HEART RATE: 74 BPM | BODY MASS INDEX: 21.4 KG/M2 | DIASTOLIC BLOOD PRESSURE: 73 MMHG | WEIGHT: 120.8 LBS | OXYGEN SATURATION: 100 % | SYSTOLIC BLOOD PRESSURE: 135 MMHG | HEIGHT: 63 IN

## 2021-06-15 DIAGNOSIS — E11.29 TYPE 2 DIABETES MELLITUS WITH OTHER DIABETIC KIDNEY COMPLICATION (HCC): ICD-10-CM

## 2021-06-15 DIAGNOSIS — I10 ESSENTIAL (PRIMARY) HYPERTENSION: ICD-10-CM

## 2021-06-15 PROCEDURE — 3017F COLORECTAL CA SCREEN DOC REV: CPT | Performed by: INTERNAL MEDICINE

## 2021-06-15 PROCEDURE — G8427 DOCREV CUR MEDS BY ELIG CLIN: HCPCS | Performed by: INTERNAL MEDICINE

## 2021-06-15 PROCEDURE — 3046F HEMOGLOBIN A1C LEVEL >9.0%: CPT | Performed by: INTERNAL MEDICINE

## 2021-06-15 PROCEDURE — 1036F TOBACCO NON-USER: CPT | Performed by: INTERNAL MEDICINE

## 2021-06-15 PROCEDURE — G8420 CALC BMI NORM PARAMETERS: HCPCS | Performed by: INTERNAL MEDICINE

## 2021-06-15 PROCEDURE — 99214 OFFICE O/P EST MOD 30 MIN: CPT | Performed by: INTERNAL MEDICINE

## 2021-06-15 PROCEDURE — 2022F DILAT RTA XM EVC RTNOPTHY: CPT | Performed by: INTERNAL MEDICINE

## 2021-06-15 PROCEDURE — 1111F DSCHRG MED/CURRENT MED MERGE: CPT | Performed by: INTERNAL MEDICINE

## 2021-06-15 RX ORDER — LIRAGLUTIDE 6 MG/ML
INJECTION SUBCUTANEOUS
COMMUNITY
Start: 2021-06-08 | End: 2021-12-09

## 2021-06-15 NOTE — PROGRESS NOTES
Patient ID:   Arby Lefort is a 57 y.o. female  Chief Complaint:   Arby Lefort presents for an evaluation of Type 2 Diabetes Mellitus , Hyperlipidemia and hypertension. Subjective:   Type 2 Diabetes Mellitus diagnosed around 2010  On insulin since 2012  Previous regimen:Taking Admelog 15 units tidac and 5 units for snacks. Basaglar 40 units once a day at night. VGO stopped due to hypoglycemias     Multiple hospitalizations in 2021 due to weakness, anemia   She is recently released from a nursing home     Not taking:   Synjardy 12.5-1000mg, two tabs in am. Last pick ups: Aug 2020 and Feb 2021. Victoza 1.8 mg daily in AM  - stopped as well     Admits making poor dietary choices, trying to cut down fried. Interval h/o hospitalizations for high blood pressure and hyperglycemia. Lantus 25 units daily in am.    Humalog was stopped due to poor adherence. Novolog startedd by pcp as per the patient. Novolog SSI (3 units for each above 150 )     Checks blood sugars 2-3 times per day. Meter reviewed 106-400   AM:     Lunch:   Supper:    HS:        Hypoglycemias: Once in last 4 weeks      Meals: 3, dinner is big. Snacks (jellos, fruits, pretzels) after every meal because she is hungry. Exercise: None    Denies chest pain, exertional dyspnea. Family history of CAD: Both parents  Denies smoking/ alcohol.         The following portions of the patient's history were reviewed and updated as appropriate:       Family History   Problem Relation Age of Onset    Cancer Mother         breast    Cancer Father     Heart Failure Neg Hx     High Cholesterol Neg Hx     Hypertension Neg Hx     Migraines Neg Hx     Rashes/Skin Problems Neg Hx     Seizures Neg Hx     Stroke Neg Hx     Thyroid Disease Neg Hx     Diabetes Neg Hx          Social History     Socioeconomic History    Marital status:      Spouse name: Not on file    Number of children: 1    Years of education: Not on file   Leigh Ann Snow Highest education level: Not on file   Occupational History    Occupation:    Tobacco Use    Smoking status: Former Smoker     Packs/day: 0.50     Years: 20.00     Pack years: 10.00     Types: Cigarettes, Cigars     Quit date: 7/3/2014     Years since quittin.9    Smokeless tobacco: Never Used   Vaping Use    Vaping Use: Never used   Substance and Sexual Activity    Alcohol use: No     Alcohol/week: 0.0 standard drinks    Drug use: No    Sexual activity: Never   Other Topics Concern    Not on file   Social History Narrative    Not on file     Social Determinants of Health     Financial Resource Strain:     Difficulty of Paying Living Expenses:    Food Insecurity:     Worried About Running Out of Food in the Last Year:     [address] in the Last Year:    Transportation Needs:     Lack of Transportation (Medical):      Lack of Transportation (Non-Medical):    Physical Activity:     Days of Exercise per Week:     Minutes of Exercise per Session:    Stress:     Feeling of Stress :    Social Connections:     Frequency of Communication with Friends and Family:     Frequency of Social Gatherings with Friends and Family:     Attends [employer] Services:     Active Member of Clubs or Organizations:     Attends Club or Organization Meetings:     Marital Status:    Intimate Partner Violence:     Fear of Current or Ex-Partner:     Emotionally Abused:     Physically Abused:     Sexually Abused:        Past Medical History:   Diagnosis Date    Abnormal brain MRI 2017    Partially empty sella and minimal chronic small vessel ischemic disease    Acute bilateral low back pain without sciatica 2016    SHARRON (acute kidney injury) ([facility] 2017    Arthritis     back    Bipolar disorder ([facility] 10/18/2008    CAD (coronary artery disease)     stent placed 20    Cancer ([facility]     bilateral breast:s/p lumpectomy/radiation:under care care of breast specialist:Dr. Renea Andres Carotid stenosis, bilateral:<50%:per US 7/2016 7/15/2016    Carpal tunnel syndrome 10/18/2008    Cervical cancer screening 2014    Nml per pt'.  Coronary artery disease of native artery of native heart with stable angina pectoris ([facility] 6/9/2020    DDD (degenerative disc disease), lumbar 7/18/2018    Depression     under care of pschiatrist:Dr. 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     Scoliosis     Spondylosis of lumbar region without myelopathy or radiculopathy 3/10/2017    Transient cerebral ischemia 07/15/2016    TIA:7/10/16    Unspecified cerebral artery occlusion with cerebral infarction     TIA       Past Surgical History:   Procedure Laterality Date    BREAST LUMPECTOMY  2015    Bilateral:breast cancer    CARDIAC CATHETERIZATION  06/08/2020    Dr. Candida Perez), DAYANA to Diag 1    COLONOSCOPY N/A 2/1/2021    COLONOSCOPY DIAGNOSTIC performed by Ayaz Lobo MD at [place] [address]  2/3/2021    CT BONE MARROW BIOPSY 2/3/2021 Davi Maria [facility] CT SCAN    HYSTERECTOMY      Benign:no cervical cancer per pt'    KIDNEY REMOVAL      right    OTHER SURGICAL HISTORY Right     orif right ankle    TUBAL LIGATION      UPPER GASTROINTESTINAL ENDOSCOPY N/A 1/29/2021    EGD BIOPSY performed by Ayaz Lobo MD at [address] [place] [address]   Allergen Reactions    Morphine Anaphylaxis and Hives feels like throat is closing    Penicillins Hives and Swelling    Codeine Hives and Rash    Penicillin G Rash         Current Outpatient Medications:     VICTOZA 18 MG/3ML SOPN SC injection, INJECT 1.8 MG UNDER THE SKIN ONCE DAILY, Disp: , Rfl:     Blood Glucose Monitoring Suppl (TRUE METRIX METER) w/Device KIT, Used to  check blood sugar 4 times eyad, Disp: 1 kit, Rfl: 1    lisinopril (PRINIVIL;ZESTRIL) 40 MG tablet, Take 0.5 tablets by mouth daily, Disp: 90 tablet, Rfl: 1    atorvastatin (LIPITOR) 20 MG tablet, Take 1 tablet by mouth daily, Disp: 30 tablet, Rfl: 5    paliperidone (INVEGA) 9 MG extended release tablet, Take 9 mg by mouth every morning , Disp: , Rfl:     pantoprazole (PROTONIX) 40 MG tablet, Take 40 mg by mouth daily , Disp: , Rfl:     ferrous sulfate (IRON 325) 325 (65 Fe) MG tablet, Take 325 mg by mouth daily (with breakfast), Disp: , Rfl:     oxyCODONE-acetaminophen (PERCOCET) 7.5-325 MG per tablet, TAKE 1 TABLET BY MOUTH EVERY 6 (SIX) HOURS AS NEEDED FOR UP TO 28 DAYS., Disp: , Rfl:     clonazePAM (KLONOPIN) 0.5 MG tablet, Take 0.5 mg by mouth 3 times daily as needed. , Disp: , Rfl:     ticagrelor (BRILINTA) 90 MG TABS tablet, Take 1 tablet by mouth 2 times daily, Disp: 60 tablet, Rfl: 1    insulin glargine (LANTUS SOLOSTAR) 100 UNIT/ML injection pen, Inject 25 Units into the skin every morning, Disp: 1 pen, Rfl: 1    nitroGLYCERIN (NITROSTAT) 0.4 MG SL tablet, up to max of 3 total doses.  If no relief after 1 dose, call 911., Disp: 25 tablet, Rfl: 3    cetirizine (ZYRTEC) 10 MG tablet, Take 10 mg by mouth daily, Disp: , Rfl:     calcium carbonate-vitamin D (CALTRATE) 600-400 MG-UNIT TABS per tab, Take 1 tablet by mouth daily , Disp: , Rfl:     aspirin 81 MG tablet, Take 81 mg by mouth daily, Disp: , Rfl:     traZODone (DESYREL) 100 MG tablet, Take 100 mg by mouth nightly, Disp: , Rfl:     gabapentin (NEURONTIN) 600 MG tablet, Take 0.5 tablets by mouth 2 times daily for 3 198* 40 - 129 U/L Final    ALT 05/18/2021 34  10 - 40 U/L Final    AST 05/18/2021 26  15 - 37 U/L Final    Globulin 05/18/2021 3.9  g/dL Final    Troponin 05/18/2021 <0.01  <0.01 ng/mL Final    pH, Kyle 05/18/2021 7.499* 7.350 - 7.450 Final    pCO2, Kyle 05/18/2021 31.9* 40.0 - 50.0 mmHg Final    pO2, Kyle 05/18/2021 132.9* 25 - 40 mmHg Final    HCO3, Venous 05/18/2021 24.3  23.0 - 29.0 mmol/L Final    Base Excess, Kyle 05/18/2021 1.5  -3.0 - 3.0 mmol/L Final    O2 Sat, Kyle 05/18/2021 99  Not Established % Final    Carboxyhemoglobin 05/18/2021 5.2* 0.0 - 1.5 % Final    MetHgb, Kyle 05/18/2021 0.4  <1.5 % Final    TC02 (Calc), Kyle 05/18/2021 25  Not Established mmol/L Final    O2 Content, Kyle 05/18/2021 14  Not Established VOL % Final    O2 Therapy 05/18/2021 Unknown   Final    Color, UA 05/18/2021 Yellow  Straw/Yellow Final    Clarity, UA 05/18/2021 Clear  Clear Final    Glucose, Ur 05/18/2021 >=1000* Negative mg/dL Final    Bilirubin Urine 05/18/2021 Negative  Negative Final    Ketones, Urine 05/18/2021 Negative  Negative mg/dL Final    Specific Gravity, UA 05/18/2021 1.010  1.005 - 1.030 Final    Blood, Urine 05/18/2021 Negative  Negative Final    pH, UA 05/18/2021 6.0  5.0 - 8.0 Final    Protein, UA 05/18/2021 Negative  Negative mg/dL Final    Urobilinogen, Urine 05/18/2021 0.2  <2.0 E.U./dL Final    Nitrite, Urine 05/18/2021 Negative  Negative Final    Leukocyte Esterase, Urine 05/18/2021 Negative  Negative Final    Microscopic Examination 05/18/2021 Not Indicated   Final    Urine Type 05/18/2021 NotGiven   Final    Beta-Hydroxybutyrate 05/18/2021 0.19  0.00 - 0.27 mmol/L Final    POC Glucose 05/18/2021 >600* 70 - 99 mg/dl Final    Performed on 05/18/2021 ACCU-CHEK   Final    Glucose 05/18/2021   mg/dL Final    QC OK? 05/18/2021 yes   Final    POC Glucose 05/18/2021 >600* 70 - 99 mg/dl Final    Performed on 05/18/2021 ACCU-CHEK   Final    Sodium 05/19/2021 134* 136 - 145 mmol/L Final    Potassium reflex Magnesium 05/19/2021 4.2  3.5 - 5.1 mmol/L Final    Chloride 05/19/2021 100  99 - 110 mmol/L Final    CO2 05/19/2021 22  21 - 32 mmol/L Final    Anion Gap 05/19/2021 12  3 - 16 Final    Glucose 05/19/2021 582* 70 - 99 mg/dL Final    BUN 05/19/2021 12  7 - 20 mg/dL Final    CREATININE 05/19/2021 1.2  0.6 - 1.2 mg/dL Final    GFR Non- 05/19/2021 45* >60 Final    GFR  05/19/2021 55* >60 Final    Calcium 05/19/2021 8.8  8.3 - 10.6 mg/dL Final    Sodium 05/19/2021 141  136 - 145 mmol/L Final    Potassium reflex Magnesium 05/19/2021 3.7  3.5 - 5.1 mmol/L Final    Chloride 05/19/2021 105  99 - 110 mmol/L Final    CO2 05/19/2021 30  21 - 32 mmol/L Final    Anion Gap 05/19/2021 6  3 - 16 Final    Glucose 05/19/2021 255* 70 - 99 mg/dL Final    BUN 05/19/2021 10  7 - 20 mg/dL Final    CREATININE 05/19/2021 1.0  0.6 - 1.2 mg/dL Final    GFR Non- 05/19/2021 56* >60 Final    GFR  05/19/2021 >60  >60 Final    Calcium 05/19/2021 9.4  8.3 - 10.6 mg/dL Final    Troponin 05/19/2021 <0.01  <0.01 ng/mL Final    Troponin 05/19/2021 <0.01  <0.01 ng/mL Final    POC Glucose 05/19/2021 338* 70 - 99 mg/dl Final    Performed on 05/19/2021 ACCU-CHEK   Final    POC Glucose 05/19/2021 198* 70 - 99 mg/dl Final    Performed on 05/19/2021 ACCU-CHEK   Final    POC Glucose 05/19/2021 149* 70 - 99 mg/dl Final    Performed on 05/19/2021 ACCU-CHEK   Final    Left Ventricular Ejection Fraction 05/20/2021 53   Final-Edited    LVEF MODALITY 05/20/2021 ECHO   Final-Edited    Left Ventricular Ejection Fraction 05/20/2021 36   Final-Edited    LVEF MODALITY 05/20/2021 Nuclear   Final-Edited    POC Glucose 05/19/2021 51* 70 - 99 mg/dl Final    Performed on 05/19/2021 ACCU-CHEK   Final    POC Glucose 05/19/2021 59* 70 - 99 mg/dl Final    Performed on 05/19/2021 ACCU-CHEK   Final    POC Glucose 05/19/2021 67* 70 - 99 mg/dl Final    Performed on 05/19/2021 ACCU-CHEK   Final    POC Glucose 05/19/2021 154* 70 - 99 mg/dl Final    Performed on 05/19/2021 ACCU-CHEK   Final    POC Glucose 05/20/2021 91  70 - 99 mg/dl Final    Performed on 05/20/2021 ACCU-CHEK   Final    POC Glucose 05/20/2021 75  70 - 99 mg/dl Final    Performed on 05/20/2021 ACCU-CHEK   Final    POC Glucose 05/20/2021 183* 70 - 99 mg/dl Final    Performed on 05/20/2021 ACCU-CHEK   Final    POC Glucose 05/20/2021 248* 70 - 99 mg/dl Final    Performed on 05/20/2021 ACCU-CHEK   Final   No results displayed because visit has over 200 results.       Admission on 02/08/2021, Discharged on 02/08/2021   Component Date Value Ref Range Status    Ventricular Rate 02/08/2021 68  BPM Final    Atrial Rate 02/08/2021 68  BPM Final    P-R Interval 02/08/2021 148  ms Final    QRS Duration 02/08/2021 72  ms Final    Q-T Interval 02/08/2021 408  ms Final    QTc Calculation (Bazett) 02/08/2021 433  ms Final    P Axis 02/08/2021 53  degrees Final    R Axis 02/08/2021 0  degrees Final    T Axis 02/08/2021 38  degrees Final    Diagnosis 02/08/2021 Normal sinus rhythmNormal ECGWhen compared with ECG of 26-JAN-2021 16:29,No significant change was foundConfirmed by Natividad Dan ([id number]) on 2/9/2021 2:43:04 PM   Final    WBC 02/08/2021 5.9  4.0 - 11.0 K/uL Final    RBC 02/08/2021 2.88* 4.00 - 5.20 M/uL Final    Hemoglobin 02/08/2021 8.7* 12.0 - 16.0 g/dL Final    Hematocrit 02/08/2021 27.2* 36.0 - 48.0 % Final    MCV 02/08/2021 94.3  80.0 - 100.0 fL Final    MCH 02/08/2021 30.2  26.0 - 34.0 pg Final    MCHC 02/08/2021 32.0  31.0 - 36.0 g/dL Final    RDW 02/08/2021 16.5* 12.4 - 15.4 % Final    Platelets 61/12/7452 245  135 - 450 K/uL Final    MPV 02/08/2021 9.1  5.0 - 10.5 fL Final    Neutrophils % 02/08/2021 75.6  % Final    Lymphocytes % 02/08/2021 16.9  % Final    Monocytes % 02/08/2021 6.6  % Final    Eosinophils % 02/08/2021 0.6  % Final    Basophils % 02/08/2021 0.3  % Final    Neutrophils Absolute 02/08/2021 4.4  1.7 - 7.7 K/uL Final    Lymphocytes Absolute 02/08/2021 1.0  1.0 - 5.1 K/uL Final    Monocytes Absolute 02/08/2021 0.4  0.0 - 1.3 K/uL Final    Eosinophils Absolute 02/08/2021 0.0  0.0 - 0.6 K/uL Final    Basophils Absolute 02/08/2021 0.0  0.0 - 0.2 K/uL Final    Sodium 02/08/2021 139  136 - 145 mmol/L Final    Potassium 02/08/2021 4.3  3.5 - 5.1 mmol/L Final    Chloride 02/08/2021 101  99 - 110 mmol/L Final    CO2 02/08/2021 28  21 - 32 mmol/L Final    Anion Gap 02/08/2021 10  3 - 16 Final    Glucose 02/08/2021 311* 70 - 99 mg/dL Final    BUN 02/08/2021 11  7 - 20 mg/dL Final    CREATININE 02/08/2021 1.1  0.6 - 1.2 mg/dL Final    GFR Non- 02/08/2021 50* >60 Final    GFR  02/08/2021 >60  >60 Final    Calcium 02/08/2021 8.6  8.3 - 10.6 mg/dL Final    Total Protein 02/08/2021 6.8  6.4 - 8.2 g/dL Final    Albumin 02/08/2021 3.4  3.4 - 5.0 g/dL Final    Albumin/Globulin Ratio 02/08/2021 1.0* 1.1 - 2.2 Final    Total Bilirubin 02/08/2021 0.3  0.0 - 1.0 mg/dL Final    Alkaline Phosphatase 02/08/2021 288* 40 - 129 U/L Final    ALT 02/08/2021 55* 10 - 40 U/L Final    AST 02/08/2021 56* 15 - 37 U/L Final    Globulin 02/08/2021 3.4  g/dL Final    Troponin 02/08/2021 <0.01  <0.01 ng/mL Final    Specimen Status 02/08/2021 KIMMY   Final    Lipase 02/08/2021 5.0* 13.0 - 60.0 U/L Final    Troponin 02/08/2021 <0.01  <0.01 ng/mL Final    Ventricular Rate 02/08/2021 56  BPM Final    Atrial Rate 02/08/2021 56  BPM Final    P-R Interval 02/08/2021 164  ms Final    QRS Duration 02/08/2021 68  ms Final    Q-T Interval 02/08/2021 458  ms Final    QTc Calculation (Bazett) 02/08/2021 441  ms Final    P Axis 02/08/2021 62  degrees Final    R Axis 02/08/2021 -13  degrees Final    T Axis 02/08/2021 35  degrees Final    Diagnosis 02/08/2021 Sinus bradycardiaOtherwise normal ECGWhen compared with ECG of 08-FEB-2021 15:11,No significant change was foundConfirmed by Eliza Vergara ([id number]) on 2/9/2021 2:43:10 PM   Final   No results displayed because visit has over 200 results.       Admission on 01/07/2021, Discharged on 01/07/2021   Component Date Value Ref Range Status    POC Glucose 01/07/2021 >600* 70 - 99 mg/dl Final    Performed on 01/07/2021 ACCU-CHEK   Final    WBC 01/07/2021 5.5  4.0 - 11.0 K/uL Final    RBC 01/07/2021 3.51* 4.00 - 5.20 M/uL Final    Hemoglobin 01/07/2021 9.8* 12.0 - 16.0 g/dL Final    Hematocrit 01/07/2021 32.9* 36.0 - 48.0 % Final    MCV 01/07/2021 93.9  80.0 - 100.0 fL Final    MCH 01/07/2021 28.0  26.0 - 34.0 pg Final    MCHC 01/07/2021 29.8* 31.0 - 36.0 g/dL Final    RDW 01/07/2021 14.8  12.4 - 15.4 % Final    Platelets 36/60/7027 217  135 - 450 K/uL Final    MPV 01/07/2021 10.1  5.0 - 10.5 fL Final    Neutrophils % 01/07/2021 69.4  % Final    Lymphocytes % 01/07/2021 22.7  % Final    Monocytes % 01/07/2021 7.1  % Final    Eosinophils % 01/07/2021 0.5  % Final    Basophils % 01/07/2021 0.3  % Final    Neutrophils Absolute 01/07/2021 3.8  1.7 - 7.7 K/uL Final    Lymphocytes Absolute 01/07/2021 1.3  1.0 - 5.1 K/uL Final    Monocytes Absolute 01/07/2021 0.4  0.0 - 1.3 K/uL Final    Eosinophils Absolute 01/07/2021 0.0  0.0 - 0.6 K/uL Final    Basophils Absolute 01/07/2021 0.0  0.0 - 0.2 K/uL Final    Sodium 01/07/2021 129* 136 - 145 mmol/L Final    Potassium reflex Magnesium 01/07/2021 4.5  3.5 - 5.1 mmol/L Final    Chloride 01/07/2021 93* 99 - 110 mmol/L Final    CO2 01/07/2021 20* 21 - 32 mmol/L Final    Anion Gap 01/07/2021 16  3 - 16 Final    Glucose 01/07/2021 621* 70 - 99 mg/dL Final    BUN 01/07/2021 21* 7 - 20 mg/dL Final    CREATININE 01/07/2021 1.1  0.6 - 1.2 mg/dL Final    GFR Non- 01/07/2021 50* >60 Final    GFR  01/07/2021 >60  >60 Final    Calcium 01/07/2021 9.0  8.3 - 10.6 mg/dL Final    Total Protein 01/07/2021 8.2  6.4 - 8.2 g/dL Final    Albumin 01/07/2021 3.5  3.4 - 5.0 g/dL Final    Albumin/Globulin Ratio 01/07/2021 0.7* 1.1 - 2.2 Final    Total Bilirubin 01/07/2021 0.3  0.0 - 1.0 mg/dL Final    Alkaline Phosphatase 01/07/2021 316* 40 - 129 U/L Final    ALT 01/07/2021 35  10 - 40 U/L Final    AST 01/07/2021 26  15 - 37 U/L Final    Globulin 01/07/2021 4.7  g/dL Final    Color, UA 01/07/2021 Straw  Straw/Yellow Final    Clarity, UA 01/07/2021 Clear  Clear Final    Glucose, Ur 01/07/2021 >=1000* Negative mg/dL Final    Bilirubin Urine 01/07/2021 Negative  Negative Final    Ketones, Urine 01/07/2021 Negative  Negative mg/dL Final    Specific Gravity, UA 01/07/2021 <=1.005  1.005 - 1.030 Final    Blood, Urine 01/07/2021 Negative  Negative Final    pH, UA 01/07/2021 5.0  5.0 - 8.0 Final    Protein, UA 01/07/2021 Negative  Negative mg/dL Final    Urobilinogen, Urine 01/07/2021 0.2  <2.0 E.U./dL Final    Nitrite, Urine 01/07/2021 Negative  Negative Final    Leukocyte Esterase, Urine 01/07/2021 Negative  Negative Final    Microscopic Examination 01/07/2021 Not Indicated   Final    Urine Type 01/07/2021 NotGiven   Final    Urine Reflex to Culture 01/07/2021 Not Indicated   Final    Magnesium 01/07/2021 2.00  1.80 - 2.40 mg/dL Final    Phosphorus 01/07/2021 3.3  2.5 - 4.9 mg/dL Final    Beta-Hydroxybutyrate 01/07/2021 1.39* 0.00 - 0.27 mmol/L Final    POC Glucose 01/07/2021 394* 70 - 99 mg/dl Final    Performed on 01/07/2021 ACCU-CHEK   Final   Admission on 11/17/2020, Discharged on 11/17/2020   Component Date Value Ref Range Status    POC Glucose 11/17/2020 >600* 70 - 99 mg/dl Final    Performed on 11/17/2020 ACCU-CHEK   Final    WBC 11/17/2020 5.8  4.0 - 11.0 K/uL Final    RBC 11/17/2020 3.03* 4.00 - 5.20 M/uL Final    Hemoglobin 11/17/2020 8.8* 12.0 - 16.0 g/dL Final    Hematocrit 11/17/2020 29.0* 36.0 - 48.0 % Final    MCV 11/17/2020 95.8  80.0 - 100.0 fL Final    MCH 11/17/2020 29.2  26.0 - 34.0 pg Final    MCHC 11/17/2020 30.4* 31.0 - 36.0 g/dL Final    RDW 11/17/2020 14.4  12.4 - 15.4 % Final    Platelets 33/88/6663 258  135 - 450 K/uL Final    MPV 11/17/2020 9.6  5.0 - 10.5 fL Final    Neutrophils % 11/17/2020 72.0  % Final    Lymphocytes % 11/17/2020 22.1  % Final    Monocytes % 11/17/2020 4.7  % Final    Eosinophils % 11/17/2020 0.5  % Final    Basophils % 11/17/2020 0.7  % Final    Neutrophils Absolute 11/17/2020 4.2  1.7 - 7.7 K/uL Final    Lymphocytes Absolute 11/17/2020 1.3  1.0 - 5.1 K/uL Final    Monocytes Absolute 11/17/2020 0.3  0.0 - 1.3 K/uL Final    Eosinophils Absolute 11/17/2020 0.0  0.0 - 0.6 K/uL Final    Basophils Absolute 11/17/2020 0.0  0.0 - 0.2 K/uL Final    Sodium 11/17/2020 129* 136 - 145 mmol/L Final    Potassium 11/17/2020 4.5  3.5 - 5.1 mmol/L Final    Chloride 11/17/2020 95* 99 - 110 mmol/L Final    CO2 11/17/2020 23  21 - 32 mmol/L Final    Anion Gap 11/17/2020 11  3 - 16 Final    Glucose 11/17/2020 700* 70 - 99 mg/dL Final    BUN 11/17/2020 11  7 - 20 mg/dL Final    CREATININE 11/17/2020 1.4* 0.6 - 1.2 mg/dL Final    GFR Non- 11/17/2020 38* >60 Final    GFR  11/17/2020 46* >60 Final    Calcium 11/17/2020 9.0  8.3 - 10.6 mg/dL Final    Total Protein 11/17/2020 7.3  6.4 - 8.2 g/dL Final    Albumin 11/17/2020 3.9  3.4 - 5.0 g/dL Final    Albumin/Globulin Ratio 11/17/2020 1.1  1.1 - 2.2 Final    Total Bilirubin 11/17/2020 <0.2  0.0 - 1.0 mg/dL Final    Alkaline Phosphatase 11/17/2020 314* 40 - 129 U/L Final    ALT 11/17/2020 42* 10 - 40 U/L Final    AST 11/17/2020 54* 15 - 37 U/L Final    Globulin 11/17/2020 3.4  g/dL Final    pH, Kyle 11/17/2020 7.314* 7.350 - 7.450 Final    pCO2, Kyle 11/17/2020 44.5  40.0 - 50.0 mmHg Final    pO2, Kyle 11/17/2020 47.4* 25 - 40 mmHg Final    HCO3, Venous 11/17/2020 22.1* 23.0 - 29.0 mmol/L Final    Base Excess, Kyle 11/17/2020 -3.9* -3.0 - 3.0 mmol/L Final    O2 Sat, Kyle 11/17/2020 82  Not Established % Final    Carboxyhemoglobin 11/17/2020 4.1* 0.0 - 1.5 % Final    MetHgb, Kyle 11/17/2020 0.2  <1.5 % Final    TC02 (Calc), Kyle 11/17/2020 24  Not Established mmol/L Final    O2 Content, Kyle 11/17/2020 11  Not Established VOL % Final    O2 Therapy 11/17/2020 Unknown   Final    Color, UA 11/17/2020 Yellow  Straw/Yellow Final    Clarity, UA 11/17/2020 Clear  Clear Final    Glucose, Ur 11/17/2020 >=1000* Negative mg/dL Final    Bilirubin Urine 11/17/2020 Negative  Negative Final    Ketones, Urine 11/17/2020 Negative  Negative mg/dL Final    Specific [place], UA 11/17/2020 1.010  1.005 - 1.030 Final    Blood, Urine 11/17/2020 Negative  Negative Final    pH, UA 11/17/2020 5.5  5.0 - 8.0 Final    Protein, UA 11/17/2020 Negative  Negative mg/dL Final    Urobilinogen, Urine 11/17/2020 0.2  <2.0 E.U./dL Final    Nitrite, Urine 11/17/2020 Negative  Negative Final    Leukocyte Esterase, Urine 11/17/2020 Negative  Negative Final    Microscopic Examination 11/17/2020 Not Indicated   Final    Urine Type 11/17/2020 NotGiven   Final    Glucose 11/17/2020 342  mg/dL Final    POC Glucose 11/17/2020 440* 70 - 99 mg/dl Final    Performed on 11/17/2020 ACCU-CHEK   Final    POC Glucose 11/17/2020 342* 70 - 99 mg/dl Final    Performed on 11/17/2020 ACCU-CHEK   Final   Admission on 11/11/2020, Discharged on 11/12/2020   Component Date Value Ref Range Status    Rapid Influenza A Ag 11/11/2020 Negative  Negative Final    Rapid Influenza B Ag 11/11/2020 Negative  Negative Final    Rapid Strep A Screen 11/11/2020 Negative  Negative Final    SARS-CoV-2, NAAT 11/11/2020 Not Detected  Not Detected Final    Strep A Culture 11/11/2020 Normal oral katherine, No Beta Strep isolated   Final    Glucose 11/12/2020 162  mg/dL Final    POC Glucose 11/11/2020 434* 70 - 99 mg/dl Final    Performed on 11/11/2020 ACCU-CHEK   Final    WBC 11/11/2020 7.6  4.0 - 11.0 K/uL Final    RBC 11/11/2020 3.65* 4.00 - 5.20 M/uL Final    Hemoglobin 11/11/2020 10.4* 12.0 - 16.0 g/dL Final    Hematocrit 11/11/2020 33.8* 36.0 - 48.0 % Final    MCV 11/11/2020 92.4  80.0 - 100.0 fL Final    MCH 11/11/2020 28.5  26.0 - 34.0 pg Final    MCHC 11/11/2020 30.9* 31.0 - 36.0 g/dL Final    RDW 11/11/2020 13.7  12.4 - 15.4 % Final    Platelets 20/37[birth year]1943 220  135 - 450 K/uL Final    MPV 11/11/2020 10.0  5.0 - 10.5 fL Final    Neutrophils % 11/11/2020 68.6  % Final    Lymphocytes % 11/11/2020 22.6  % Final    Monocytes % 11/11/2020 8.0  % Final    Eosinophils % 11/11/2020 0.4  % Final    Basophils % 11/11/2020 0.4  % Final    Neutrophils Absolute 11/11/2020 5.2  1.7 - 7.7 K/uL Final    Lymphocytes Absolute 11/11/2020 1.7  1.0 - 5.1 K/uL Final    Monocytes Absolute 11/11/2020 0.6  0.0 - 1.3 K/uL Final    Eosinophils Absolute 11/11/2020 0.0  0.0 - 0.6 K/uL Final    Basophils Absolute 11/11/2020 0.0  0.0 - 0.2 K/uL Final    Sodium 11/11/2020 134* 136 - 145 mmol/L Final    Potassium reflex Magnesium 11/11/2020 3.9  3.5 - 5.1 mmol/L Final    Chloride 11/11/2020 97* 99 - 110 mmol/L Final    CO2 11/11/2020 22  21 - 32 mmol/L Final    Anion Gap 11/11/2020 15  3 - 16 Final    Glucose 11/11/2020 377* 70 - 99 mg/dL Final    BUN 11/11/2020 16  7 - 20 mg/dL Final    CREATININE 11/11/2020 1.2  0.6 - 1.2 mg/dL Final    GFR Non- 11/11/2020 46* >60 Final    GFR  11/11/2020 55* >60 Final    Calcium 11/11/2020 9.8  8.3 - 10.6 mg/dL Final    Total Protein 11/11/2020 7.9  6.4 - 8.2 g/dL Final    Albumin 11/11/2020 3.3* 3.4 - 5.0 g/dL Final    Albumin/Globulin Ratio 11/11/2020 0.7* 1.1 - 2.2 Final    Total Bilirubin 11/11/2020 0.4  0.0 - 1.0 mg/dL Final    Alkaline Phosphatase 11/11/2020 201* 40 - 129 U/L Final    ALT 11/11/2020 28  10 - 40 U/L Final    AST 11/11/2020 18  15 - 37 U/L Final    Globulin 11/11/2020 4.6  g/dL Final    Color, UA 11/11/2020 Straw  Straw/Yellow Final    Clarity, UA 11/11/2020 SL CLOUDY* Clear Final    Glucose, Ur 11/11/2020 >=1000* Negative mg/dL Final    Bilirubin Urine 11/11/2020 Negative  Negative Final    Ketones, Urine 11/11/2020 TRACE* Negative mg/dL Final    Specific [place], UA 11/11/2020 <=1.005  1.005 - 1.030 Final    Blood, Urine 11/11/2020 Negative  Negative Final    pH, UA 11/11/2020 5.5  5.0 - 8.0 Final    Protein, UA 11/11/2020 Negative  Negative mg/dL Final    Urobilinogen, Urine 11/11/2020 0.2  <2.0 E.U./dL Final    Nitrite, Urine 11/11/2020 POSITIVE* Negative Final    Leukocyte Esterase, Urine 11/11/2020 Negative  Negative Final    Microscopic Examination 11/11/2020 YES   Final    Urine Type 11/11/2020 NotGiven   Final    pH, Kyle 11/11/2020 7.432  7.350 - 7.450 Final    pCO2, Kyle 11/11/2020 35.5* 40.0 - 50.0 mmHg Final    pO2, [facility] 11/11/2020 177.3* 25 - 40 mmHg Final    HCO3, Venous 11/11/2020 23.1  23.0 - 29.0 mmol/L Final    Base Excess, Kyle 11/11/2020 -0.7  -3.0 - 3.0 mmol/L Final    O2 Sat, Kyle 11/11/2020 98  Not Established % Final    Carboxyhemoglobin 11/11/2020 1.6* 0.0 - 1.5 % Final    MetHgb, Kyle 11/11/2020 0.0  <1.5 % Final    TC02 (Calc), Kyle 11/11/2020 24  Not Established mmol/L Final    O2 Content, Kyle 11/11/2020 16  Not Established VOL % Final    O2 Therapy 11/11/2020 Unknown   Final    WBC, UA 11/11/2020 21-50* 0 - 5 /HPF Final    RBC, UA 11/11/2020 None seen  0 - 4 /HPF Final    Epithelial Cells, UA 11/11/2020 2-5  0 - 5 /HPF Final    Bacteria, UA 11/11/2020 2+* None Seen /HPF Final    POC Glucose 11/12/2020 162* 70 - 99 mg/dl Final    Performed on 11/12/2020 [facility] Outpatient Visit on 11/09/2020   Component Date Value Ref Range Status    CA 19-9 11/09/2020 58* 0 - 35 U/mL Final    CEA 11/09/2020 12.6* 0.0 - 5.0 ng/mL Final    Cortisol - AM 11/09/2020 24.3* 4.3 - 22.4 ug/dL Final    T3, Free 11/09/2020 1.4* 2.3 - 4.2 pg/mL Final    T4 Free 11/09/2020 1.1  0.9 - 1.8 ng/dL Final    TSH 11/09/2020 0.92 0.27 - 4.20 uIU/mL Final   Admission on 10/07/2020, Discharged on 10/08/2020   Component Date Value Ref Range Status    WBC 10/07/2020 6.7  4.0 - 11.0 K/uL Final    RBC 10/07/2020 3.58* 4.00 - 5.20 M/uL Final    Hemoglobin 10/07/2020 10.0* 12.0 - 16.0 g/dL Final    Hematocrit 10/07/2020 32.3* 36.0 - 48.0 % Final    MCV 10/07/2020 90.1  80.0 - 100.0 fL Final    MCH 10/07/2020 27.9  26.0 - 34.0 pg Final    MCHC 10/07/2020 30.9* 31.0 - 36.0 g/dL Final    RDW 10/07/2020 13.5  12.4 - 15.4 % Final    Platelets 92/01/0337 163  135 - 450 K/uL Final    MPV 10/07/2020 10.4  5.0 - 10.5 fL Final    Neutrophils % 10/07/2020 76.4  % Final    Lymphocytes % 10/07/2020 19.0  % Final    Monocytes % 10/07/2020 4.0  % Final    Eosinophils % 10/07/2020 0.1  % Final    Basophils % 10/07/2020 0.5  % Final    Neutrophils Absolute 10/07/2020 5.1  1.7 - 7.7 K/uL Final    Lymphocytes Absolute 10/07/2020 1.3  1.0 - 5.1 K/uL Final    Monocytes Absolute 10/07/2020 0.3  0.0 - 1.3 K/uL Final    Eosinophils Absolute 10/07/2020 0.0  0.0 - 0.6 K/uL Final    Basophils Absolute 10/07/2020 0.0  0.0 - 0.2 K/uL Final    Sodium 10/07/2020 127* 136 - 145 mmol/L Final    Potassium 10/07/2020 4.4  3.5 - 5.1 mmol/L Final    Chloride 10/07/2020 96* 99 - 110 mmol/L Final    CO2 10/07/2020 15* 21 - 32 mmol/L Final    Anion Gap 10/07/2020 16  3 - 16 Final    Glucose 10/07/2020 634* 70 - 99 mg/dL Final    BUN 10/07/2020 35* 7 - 20 mg/dL Final    CREATININE 10/07/2020 1.3* 0.6 - 1.2 mg/dL Final    GFR Non- 10/07/2020 42* >60 Final    GFR  10/07/2020 50* >60 Final    Calcium 10/07/2020 9.0  8.3 - 10.6 mg/dL Final    Total Protein 10/07/2020 7.0  6.4 - 8.2 g/dL Final    Albumin 10/07/2020 3.7  3.4 - 5.0 g/dL Final    Albumin/Globulin Ratio 10/07/2020 1.1  1.1 - 2.2 Final    Total Bilirubin 10/07/2020 0.4  0.0 - 1.0 mg/dL Final    Alkaline Phosphatase 10/07/2020 167* 40 - 129 U/L Final    ALT 10/07/2020 42* 10 - 40 U/L Final    AST 10/07/2020 24  15 - 37 U/L Final    Globulin 10/07/2020 3.3  g/dL Final    pH, Kyle 10/07/2020 7.328* 7.350 - 7.450 Final    pCO2, Kyle 10/07/2020 31.0* 40.0 - 50.0 mmHg Final    pO2, Kyle 10/07/2020 162.1* 25 - 40 mmHg Final    HCO3, Venous 10/07/2020 15.9* 23.0 - 29.0 mmol/L Final    Base Excess, [place] 10/07/2020 -9.0* -3.0 - 3.0 mmol/L Final    O2 Sat, Kyle 10/07/2020 98  Not Established % Final    Carboxyhemoglobin 10/07/2020 2.2* 0.0 - 1.5 % Final    MetHgb, Kyle 10/07/2020 0.1  <1.5 % Final    TC02 (Calc), Kyle 10/07/2020 17  Not Established mmol/L Final    O2 Content, Kyle 10/07/2020 15  Not Established VOL % Final    O2 Therapy 10/07/2020 Unknown   Final    Phosphorus 10/07/2020 3.6  2.5 - 4.9 mg/dL Final    Lactic Acid 10/07/2020 1.8  0.4 - 2.0 mmol/L Final    Ventricular Rate 10/07/2020 62  BPM Final    Atrial Rate 10/07/2020 62  BPM Final    P-R Interval 10/07/2020 144  ms Final    QRS Duration 10/07/2020 82  ms Final    Q-T Interval 10/07/2020 408  ms Final    QTc Calculation (Bazett) 10/07/2020 414  ms Final    P Axis 10/07/2020 55  degrees Final    R Axis 10/07/2020 -32  degrees Final    T Axis 10/07/2020 44  degrees Final    Diagnosis 10/07/2020 Normal sinus rhythm with sinus arrhythmiaLeft axis deviationAbnormal ECGWhen compared with ECG of 11-JUL-2020 07:49,No significant change was foundConfirmed by Binu Vasquez MD, Talia Mosquera ([id number]) on 10/8/2020 4:38:16 PM   Final    POC Glucose 10/07/2020 576* 70 - 99 mg/dl Final    Performed on 10/07/2020 ACCU-CHEK   Final    Color, UA 10/07/2020 Yellow  Straw/Yellow Final    Clarity, UA 10/07/2020 Clear  Clear Final    Glucose, Ur 10/07/2020 >=1000* Negative mg/dL Final    Bilirubin Urine 10/07/2020 Negative  Negative Final    Ketones, Urine 10/07/2020 TRACE* Negative mg/dL Final    Specific [place], UA 10/07/2020 1.010  1.005 - 1.030 Final    Blood, Urine 10/07/2020 SMALL* Negative Final    pH, UA 10/07/2020 5.5  5.0 - 8.0 Final    Protein, UA 10/07/2020 Negative  Negative mg/dL Final    Urobilinogen, Urine 10/07/2020 0.2  <2.0 E.U./dL Final    Nitrite, Urine 10/07/2020 Negative  Negative Final    Leukocyte Esterase, Urine 10/07/2020 Negative  Negative Final    Microscopic Examination 10/07/2020 YES   Final    Urine Type 10/07/2020 NotGiven   Final    Urine Reflex to Culture 10/07/2020 Not Indicated   Final    WBC, UA 10/07/2020 0-2  0 - 5 /HPF Final    RBC, UA 10/07/2020 5-10* 0 - 4 /HPF Final    Epithelial Cells, UA 10/07/2020 0-1  0 - 5 /HPF Final    Bacteria, UA 10/07/2020 Rare* None Seen /HPF Final    Amorphous, UA 10/07/2020 Rare  /HPF Final    Yeast, UA 10/07/2020 Present* None Seen /HPF Final    Magnesium 10/07/2020 1.90  1.80 - 2.40 mg/dL Final    Troponin 10/07/2020 <0.01  <0.01 ng/mL Final    POC Glucose 10/07/2020 544* 70 - 99 mg/dl Final    Performed on 10/07/2020 ACCU-CHEK   Final    Sodium 10/07/2020 137  136 - 145 mmol/L Final    Potassium 10/07/2020 3.0* 3.5 - 5.1 mmol/L Final    Chloride 10/07/2020 104  99 - 110 mmol/L Final    CO2 10/07/2020 21  21 - 32 mmol/L Final    Anion Gap 10/07/2020 12  3 - 16 Final    Glucose 10/07/2020 127* 70 - 99 mg/dL Final    BUN 10/07/2020 28* 7 - 20 mg/dL Final    CREATININE 10/07/2020 1.4* 0.6 - 1.2 mg/dL Final    GFR Non- 10/07/2020 38* >60 Final    GFR  10/07/2020 46* >60 Final    Calcium 10/07/2020 9.0  8.3 - 10.6 mg/dL Final    Sodium 10/08/2020 140  136 - 145 mmol/L Final    Potassium 10/08/2020 3.3* 3.5 - 5.1 mmol/L Final    Chloride 10/08/2020 109  99 - 110 mmol/L Final    CO2 10/08/2020 20* 21 - 32 mmol/L Final    Anion Gap 10/08/2020 11  3 - 16 Final    Glucose 10/08/2020 186* 70 - 99 mg/dL Final    BUN 10/08/2020 28* 7 - 20 mg/dL Final    CREATININE 10/08/2020 1.2  0.6 - 1.2 mg/dL Final    GFR Non- 10/08/2020 46* >60 Final    GFR  10/08/2020 55* >60 Final    Calcium 10/08/2020 8.7  8.3 - 10.6 mg/dL Final    Sodium 10/08/2020 137  136 - 145 mmol/L Final    Potassium 10/08/2020 4.1  3.5 - 5.1 mmol/L Final    Chloride 10/08/2020 107  99 - 110 mmol/L Final    CO2 10/08/2020 21  21 - 32 mmol/L Final    Anion Gap 10/08/2020 9  3 - 16 Final    Glucose 10/08/2020 213* 70 - 99 mg/dL Final    BUN 10/08/2020 25* 7 - 20 mg/dL Final    CREATININE 10/08/2020 1.2  0.6 - 1.2 mg/dL Final    GFR Non- 10/08/2020 46* >60 Final    GFR  10/08/2020 55* >60 Final    Calcium 10/08/2020 8.6  8.3 - 10.6 mg/dL Final    Magnesium 10/07/2020 1.90  1.80 - 2.40 mg/dL Final    Magnesium 10/08/2020 1.90  1.80 - 2.40 mg/dL Final    Magnesium 10/08/2020 1.90  1.80 - 2.40 mg/dL Final    Phosphorus 10/07/2020 2.6  2.5 - 4.9 mg/dL Final    Phosphorus 10/08/2020 3.1  2.5 - 4.9 mg/dL Final    Phosphorus 10/08/2020 3.2  2.5 - 4.9 mg/dL Final    POC Glucose 10/07/2020 419* 70 - 99 mg/dl Final    Performed on 10/07/2020 ACCU-CHEK   Final    POC Glucose 10/07/2020 305* 70 - 99 mg/dl Final    Performed on 10/07/2020 ACCU-CHEK   Final    POC Glucose 10/07/2020 195* 70 - 99 mg/dl Final    Performed on 10/07/2020 ACCU-CHEK   Final    POC Glucose 10/07/2020 126* 70 - 99 mg/dl Final    Performed on 10/07/2020 ACCU-CHEK   Final    POC Glucose 10/08/2020 136* 70 - 99 mg/dl Final    Performed on 10/08/2020 ACCU-CHEK   Final    POC Glucose 10/08/2020 165* 70 - 99 mg/dl Final    Performed on 10/08/2020 ACCU-CHEK   Final    POC Glucose 10/08/2020 180* 70 - 99 mg/dl Final    Performed on 10/08/2020 ACCU-CHEK   Final    POC Glucose 10/08/2020 191* 70 - 99 mg/dl Final    Performed on 10/08/2020 ACCU-CHEK   Final    POC Glucose 10/08/2020 186* 70 - 99 mg/dl Final    Performed on 10/08/2020 ACCU-CHEK   Final    POC Glucose 10/08/2020 209* 70 - 99 mg/dl Final    Performed on 10/08/2020 ACCU-CHEK   Final    Hemoglobin A1C 10/08/2020 11.9  See comment Lea Crump MD on 6/15/2021 at 8:56 AM

## 2021-06-15 NOTE — PATIENT INSTRUCTIONS
Lantus 28 units daily     Novolog SS                     With meals (during the day)   - at bedtime    < 150 ---     3 units                                 0 units  151-200 --  4 units                                 0 units  201-250 :    6 units                                 1 units  251-300:     7 units                                 2 units  301-350:     8 units                                 3 units  >350 :         9 units                                  4 units

## 2021-06-16 DIAGNOSIS — E11.29 TYPE 2 DIABETES MELLITUS WITH OTHER DIABETIC KIDNEY COMPLICATION (HCC): ICD-10-CM

## 2021-06-16 RX ORDER — FLASH GLUCOSE SENSOR
KIT MISCELLANEOUS
Qty: 2 EACH | Refills: 5 | Status: SHIPPED | OUTPATIENT
Start: 2021-06-16 | End: 2021-08-17

## 2021-06-16 RX ORDER — FLASH GLUCOSE SCANNING READER
EACH MISCELLANEOUS
Qty: 1 EACH | Refills: 0 | Status: SHIPPED | OUTPATIENT
Start: 2021-06-16 | End: 2021-08-17

## 2021-07-04 ENCOUNTER — TELEPHONE (OUTPATIENT)
Dept: ENDOCRINOLOGY | Age: 61
End: 2021-07-04

## 2021-07-04 DIAGNOSIS — E11.21 TYPE 2 DIABETES MELLITUS WITH DIABETIC NEPHROPATHY (HCC): ICD-10-CM

## 2021-07-04 DIAGNOSIS — E11.29 TYPE 2 DIABETES MELLITUS WITH OTHER DIABETIC KIDNEY COMPLICATION (HCC): ICD-10-CM

## 2021-07-05 NOTE — TELEPHONE ENCOUNTER
Patient [place] and [place] insulin. She needed Lantus pens, 26 units in the morning. However, patient did not know the pharmacy number were to send prescription. She said she will call me back with the phone number, but I have not received phone call. I called her back and patient stated that she is awaiting for her son to give her pharmacy phone number. Patient has never called back.

## 2021-07-06 RX ORDER — LIRAGLUTIDE 6 MG/ML
INJECTION SUBCUTANEOUS
Qty: 2 PEN | Refills: 5 | OUTPATIENT
Start: 2021-07-06

## 2021-07-19 RX ORDER — CALCIUM CITRATE/VITAMIN D3 200MG-6.25
TABLET ORAL
Qty: 200 STRIP | Refills: 5 | Status: SHIPPED | OUTPATIENT
Start: 2021-07-19 | End: 2022-04-18

## 2021-08-08 ENCOUNTER — HOSPITAL ENCOUNTER (INPATIENT)
Age: 61
LOS: 3 days | Discharge: HOME | DRG: 346 | End: 2021-08-11
Attending: EMERGENCY MEDICINE | Admitting: INTERNAL MEDICINE
Payer: MEDICAID

## 2021-08-08 ENCOUNTER — APPOINTMENT (OUTPATIENT)
Dept: CT IMAGING | Age: 61
DRG: 346 | End: 2021-08-08
Payer: MEDICAID

## 2021-08-08 DIAGNOSIS — M31.6 TEMPORAL ARTERITIS (HCC): Primary | ICD-10-CM

## 2021-08-08 DIAGNOSIS — E11.65 TYPE 2 DIABETES MELLITUS WITH HYPERGLYCEMIA, WITH LONG-TERM CURRENT USE OF INSULIN (HCC): ICD-10-CM

## 2021-08-08 DIAGNOSIS — Z79.4 TYPE 2 DIABETES MELLITUS WITH HYPERGLYCEMIA, WITH LONG-TERM CURRENT USE OF INSULIN (HCC): ICD-10-CM

## 2021-08-08 PROBLEM — H57.11 ACUTE RIGHT EYE PAIN: Status: ACTIVE | Noted: 2021-08-08

## 2021-08-08 PROBLEM — N18.32 STAGE 3B CHRONIC KIDNEY DISEASE (HCC): Status: ACTIVE | Noted: 2017-09-17

## 2021-08-08 PROBLEM — Z98.61 CAD S/P PERCUTANEOUS CORONARY ANGIOPLASTY: Status: ACTIVE | Noted: 2020-06-09

## 2021-08-08 LAB
A/G RATIO: 1 (ref 1.1–2.2)
ALBUMIN SERPL-MCNC: 4.2 G/DL (ref 3.4–5)
ALP BLD-CCNC: 220 U/L (ref 40–129)
ALT SERPL-CCNC: 78 U/L (ref 10–40)
ANION GAP SERPL CALCULATED.3IONS-SCNC: 9 MMOL/L (ref 3–16)
APTT: 28.8 SEC (ref 26.2–38.6)
AST SERPL-CCNC: 35 U/L (ref 15–37)
BASE EXCESS VENOUS: 2.2 MMOL/L (ref -3–3)
BASOPHILS ABSOLUTE: 0 K/UL (ref 0–0.2)
BASOPHILS RELATIVE PERCENT: 0.4 %
BILIRUB SERPL-MCNC: <0.2 MG/DL (ref 0–1)
BILIRUBIN URINE: NEGATIVE
BLOOD, URINE: NEGATIVE
BUN BLDV-MCNC: 19 MG/DL (ref 7–20)
CALCIUM SERPL-MCNC: 10.2 MG/DL (ref 8.3–10.6)
CARBOXYHEMOGLOBIN: 2.8 % (ref 0–1.5)
CHLORIDE BLD-SCNC: 93 MMOL/L (ref 99–110)
CLARITY: CLEAR
CO2: 28 MMOL/L (ref 21–32)
COLOR: ABNORMAL
CREAT SERPL-MCNC: 1.3 MG/DL (ref 0.6–1.2)
EKG ATRIAL RATE: 45 BPM
EKG DIAGNOSIS: NORMAL
EKG P AXIS: 69 DEGREES
EKG P-R INTERVAL: 190 MS
EKG Q-T INTERVAL: 460 MS
EKG QRS DURATION: 86 MS
EKG QTC CALCULATION (BAZETT): 397 MS
EKG R AXIS: -19 DEGREES
EKG T AXIS: 51 DEGREES
EKG VENTRICULAR RATE: 45 BPM
EOSINOPHILS ABSOLUTE: 0 K/UL (ref 0–0.6)
EOSINOPHILS RELATIVE PERCENT: 1 %
GFR AFRICAN AMERICAN: 50
GFR NON-AFRICAN AMERICAN: 41
GLOBULIN: 4.1 G/DL
GLUCOSE BLD-MCNC: 291 MG/DL
GLUCOSE BLD-MCNC: 291 MG/DL (ref 70–99)
GLUCOSE BLD-MCNC: 433 MG/DL (ref 70–99)
GLUCOSE BLD-MCNC: 564 MG/DL (ref 70–99)
GLUCOSE URINE: >=1000 MG/DL
HCO3 VENOUS: 28.9 MMOL/L (ref 23–29)
HCT VFR BLD CALC: 33.6 % (ref 36–48)
HEMOGLOBIN: 11.2 G/DL (ref 12–16)
INR BLD: 0.91 (ref 0.88–1.12)
KETONES, URINE: NEGATIVE MG/DL
LEUKOCYTE ESTERASE, URINE: NEGATIVE
LYMPHOCYTES ABSOLUTE: 2 K/UL (ref 1–5.1)
LYMPHOCYTES RELATIVE PERCENT: 39.9 %
MCH RBC QN AUTO: 28.7 PG (ref 26–34)
MCHC RBC AUTO-ENTMCNC: 33.2 G/DL (ref 31–36)
MCV RBC AUTO: 86.5 FL (ref 80–100)
METHEMOGLOBIN VENOUS: 0.5 %
MICROSCOPIC EXAMINATION: ABNORMAL
MONOCYTES ABSOLUTE: 0.3 K/UL (ref 0–1.3)
MONOCYTES RELATIVE PERCENT: 5.1 %
NEUTROPHILS ABSOLUTE: 2.7 K/UL (ref 1.7–7.7)
NEUTROPHILS RELATIVE PERCENT: 53.6 %
NITRITE, URINE: NEGATIVE
O2 SAT, VEN: 73 %
O2 THERAPY: ABNORMAL
PCO2, VEN: 55.1 MMHG (ref 40–50)
PDW BLD-RTO: 12.4 % (ref 12.4–15.4)
PERFORMED ON: ABNORMAL
PERFORMED ON: ABNORMAL
PH UA: 6.5 (ref 5–8)
PH VENOUS: 7.34 (ref 7.35–7.45)
PLATELET # BLD: 152 K/UL (ref 135–450)
PMV BLD AUTO: 9.3 FL (ref 5–10.5)
PO2, VEN: 38.6 MMHG (ref 25–40)
POTASSIUM SERPL-SCNC: 4.4 MMOL/L (ref 3.5–5.1)
PROTEIN UA: NEGATIVE MG/DL
PROTHROMBIN TIME: 10.3 SEC (ref 9.9–12.7)
RBC # BLD: 3.89 M/UL (ref 4–5.2)
SEDIMENTATION RATE, ERYTHROCYTE: 51 MM/HR (ref 0–30)
SODIUM BLD-SCNC: 130 MMOL/L (ref 136–145)
SPECIFIC GRAVITY UA: 1.01 (ref 1–1.03)
TCO2 CALC VENOUS: 31 MMOL/L
TOTAL PROTEIN: 8.3 G/DL (ref 6.4–8.2)
URINE REFLEX TO CULTURE: ABNORMAL
URINE TYPE: ABNORMAL
UROBILINOGEN, URINE: 0.2 E.U./DL
WBC # BLD: 5.1 K/UL (ref 4–11)

## 2021-08-08 PROCEDURE — 96375 TX/PRO/DX INJ NEW DRUG ADDON: CPT

## 2021-08-08 PROCEDURE — 86140 C-REACTIVE PROTEIN: CPT

## 2021-08-08 PROCEDURE — 6370000000 HC RX 637 (ALT 250 FOR IP): Performed by: INTERNAL MEDICINE

## 2021-08-08 PROCEDURE — 1200000000 HC SEMI PRIVATE

## 2021-08-08 PROCEDURE — 99285 EMERGENCY DEPT VISIT HI MDM: CPT

## 2021-08-08 PROCEDURE — 6360000002 HC RX W HCPCS: Performed by: EMERGENCY MEDICINE

## 2021-08-08 PROCEDURE — 85652 RBC SED RATE AUTOMATED: CPT

## 2021-08-08 PROCEDURE — 93010 ELECTROCARDIOGRAM REPORT: CPT | Performed by: INTERNAL MEDICINE

## 2021-08-08 PROCEDURE — 81003 URINALYSIS AUTO W/O SCOPE: CPT

## 2021-08-08 PROCEDURE — 6360000004 HC RX CONTRAST MEDICATION: Performed by: PHYSICIAN ASSISTANT

## 2021-08-08 PROCEDURE — 2580000003 HC RX 258: Performed by: PHYSICIAN ASSISTANT

## 2021-08-08 PROCEDURE — 80053 COMPREHEN METABOLIC PANEL: CPT

## 2021-08-08 PROCEDURE — 93005 ELECTROCARDIOGRAM TRACING: CPT | Performed by: PHYSICIAN ASSISTANT

## 2021-08-08 PROCEDURE — 70450 CT HEAD/BRAIN W/O DYE: CPT

## 2021-08-08 PROCEDURE — 6360000002 HC RX W HCPCS: Performed by: PHYSICIAN ASSISTANT

## 2021-08-08 PROCEDURE — 96361 HYDRATE IV INFUSION ADD-ON: CPT

## 2021-08-08 PROCEDURE — 6370000000 HC RX 637 (ALT 250 FOR IP): Performed by: PHYSICIAN ASSISTANT

## 2021-08-08 PROCEDURE — 85610 PROTHROMBIN TIME: CPT

## 2021-08-08 PROCEDURE — 82803 BLOOD GASES ANY COMBINATION: CPT

## 2021-08-08 PROCEDURE — 85730 THROMBOPLASTIN TIME PARTIAL: CPT

## 2021-08-08 PROCEDURE — 2580000003 HC RX 258: Performed by: EMERGENCY MEDICINE

## 2021-08-08 PROCEDURE — 85025 COMPLETE CBC W/AUTO DIFF WBC: CPT

## 2021-08-08 PROCEDURE — 6370000000 HC RX 637 (ALT 250 FOR IP): Performed by: EMERGENCY MEDICINE

## 2021-08-08 PROCEDURE — 70496 CT ANGIOGRAPHY HEAD: CPT

## 2021-08-08 PROCEDURE — 96374 THER/PROPH/DIAG INJ IV PUSH: CPT

## 2021-08-08 RX ORDER — 0.9 % SODIUM CHLORIDE 0.9 %
500 INTRAVENOUS SOLUTION INTRAVENOUS ONCE
Status: COMPLETED | OUTPATIENT
Start: 2021-08-08 | End: 2021-08-08

## 2021-08-08 RX ORDER — PREDNISONE 20 MG/1
60 TABLET ORAL DAILY
Status: DISCONTINUED | OUTPATIENT
Start: 2021-08-09 | End: 2021-08-10

## 2021-08-08 RX ORDER — SODIUM CHLORIDE 9 MG/ML
25 INJECTION, SOLUTION INTRAVENOUS PRN
Status: DISCONTINUED | OUTPATIENT
Start: 2021-08-08 | End: 2021-08-11 | Stop reason: HOSPADM

## 2021-08-08 RX ORDER — ACETAMINOPHEN 650 MG/1
650 SUPPOSITORY RECTAL EVERY 6 HOURS PRN
Status: DISCONTINUED | OUTPATIENT
Start: 2021-08-08 | End: 2021-08-11 | Stop reason: HOSPADM

## 2021-08-08 RX ORDER — CLONAZEPAM 0.5 MG/1
0.5 TABLET ORAL 3 TIMES DAILY PRN
Status: DISCONTINUED | OUTPATIENT
Start: 2021-08-08 | End: 2021-08-11 | Stop reason: HOSPADM

## 2021-08-08 RX ORDER — PANTOPRAZOLE SODIUM 40 MG/1
40 TABLET, DELAYED RELEASE ORAL DAILY
Status: DISCONTINUED | OUTPATIENT
Start: 2021-08-09 | End: 2021-08-11 | Stop reason: HOSPADM

## 2021-08-08 RX ORDER — GABAPENTIN 300 MG/1
300 CAPSULE ORAL 2 TIMES DAILY
Status: DISCONTINUED | OUTPATIENT
Start: 2021-08-08 | End: 2021-08-11 | Stop reason: HOSPADM

## 2021-08-08 RX ORDER — ONDANSETRON 4 MG/1
4 TABLET, ORALLY DISINTEGRATING ORAL EVERY 8 HOURS PRN
Status: DISCONTINUED | OUTPATIENT
Start: 2021-08-08 | End: 2021-08-11 | Stop reason: HOSPADM

## 2021-08-08 RX ORDER — ACETAMINOPHEN 325 MG/1
650 TABLET ORAL EVERY 6 HOURS PRN
Status: DISCONTINUED | OUTPATIENT
Start: 2021-08-08 | End: 2021-08-11 | Stop reason: HOSPADM

## 2021-08-08 RX ORDER — POTASSIUM CHLORIDE 7.45 MG/ML
10 INJECTION INTRAVENOUS PRN
Status: DISCONTINUED | OUTPATIENT
Start: 2021-08-08 | End: 2021-08-11 | Stop reason: HOSPADM

## 2021-08-08 RX ORDER — METOCLOPRAMIDE HYDROCHLORIDE 5 MG/ML
10 INJECTION INTRAMUSCULAR; INTRAVENOUS ONCE
Status: COMPLETED | OUTPATIENT
Start: 2021-08-08 | End: 2021-08-08

## 2021-08-08 RX ORDER — LISINOPRIL 20 MG/1
20 TABLET ORAL DAILY
Status: DISCONTINUED | OUTPATIENT
Start: 2021-08-09 | End: 2021-08-11 | Stop reason: HOSPADM

## 2021-08-08 RX ORDER — TETRACAINE HYDROCHLORIDE 5 MG/ML
1 SOLUTION OPHTHALMIC EVERY 4 HOURS PRN
Status: DISCONTINUED | OUTPATIENT
Start: 2021-08-08 | End: 2021-08-11 | Stop reason: HOSPADM

## 2021-08-08 RX ORDER — OXYCODONE AND ACETAMINOPHEN 7.5; 325 MG/1; MG/1
1 TABLET ORAL EVERY 6 HOURS PRN
Status: DISCONTINUED | OUTPATIENT
Start: 2021-08-08 | End: 2021-08-11 | Stop reason: HOSPADM

## 2021-08-08 RX ORDER — INSULIN GLARGINE 100 [IU]/ML
30 INJECTION, SOLUTION SUBCUTANEOUS NIGHTLY
Status: DISCONTINUED | OUTPATIENT
Start: 2021-08-08 | End: 2021-08-09

## 2021-08-08 RX ORDER — DEXTROSE MONOHYDRATE 25 G/50ML
12.5 INJECTION, SOLUTION INTRAVENOUS PRN
Status: DISCONTINUED | OUTPATIENT
Start: 2021-08-08 | End: 2021-08-11 | Stop reason: HOSPADM

## 2021-08-08 RX ORDER — SODIUM CHLORIDE 0.9 % (FLUSH) 0.9 %
10 SYRINGE (ML) INJECTION PRN
Status: DISCONTINUED | OUTPATIENT
Start: 2021-08-08 | End: 2021-08-11 | Stop reason: HOSPADM

## 2021-08-08 RX ORDER — DEXTROSE MONOHYDRATE 50 MG/ML
100 INJECTION, SOLUTION INTRAVENOUS PRN
Status: DISCONTINUED | OUTPATIENT
Start: 2021-08-08 | End: 2021-08-11 | Stop reason: HOSPADM

## 2021-08-08 RX ORDER — POTASSIUM CHLORIDE 20 MEQ/1
40 TABLET, EXTENDED RELEASE ORAL PRN
Status: DISCONTINUED | OUTPATIENT
Start: 2021-08-08 | End: 2021-08-11 | Stop reason: HOSPADM

## 2021-08-08 RX ORDER — NICOTINE POLACRILEX 4 MG
15 LOZENGE BUCCAL PRN
Status: DISCONTINUED | OUTPATIENT
Start: 2021-08-08 | End: 2021-08-11 | Stop reason: HOSPADM

## 2021-08-08 RX ORDER — PALIPERIDONE 3 MG/1
9 TABLET, EXTENDED RELEASE ORAL EVERY MORNING
Status: DISCONTINUED | OUTPATIENT
Start: 2021-08-09 | End: 2021-08-11 | Stop reason: HOSPADM

## 2021-08-08 RX ORDER — MAGNESIUM SULFATE 1 G/100ML
1000 INJECTION INTRAVENOUS PRN
Status: DISCONTINUED | OUTPATIENT
Start: 2021-08-08 | End: 2021-08-11 | Stop reason: HOSPADM

## 2021-08-08 RX ORDER — TETRACAINE HYDROCHLORIDE 5 MG/ML
1 SOLUTION OPHTHALMIC ONCE
Status: COMPLETED | OUTPATIENT
Start: 2021-08-08 | End: 2021-08-08

## 2021-08-08 RX ORDER — ATORVASTATIN CALCIUM 10 MG/1
20 TABLET, FILM COATED ORAL NIGHTLY
Status: DISCONTINUED | OUTPATIENT
Start: 2021-08-08 | End: 2021-08-11 | Stop reason: HOSPADM

## 2021-08-08 RX ORDER — TRAZODONE HYDROCHLORIDE 50 MG/1
100 TABLET ORAL NIGHTLY
Status: DISCONTINUED | OUTPATIENT
Start: 2021-08-08 | End: 2021-08-11 | Stop reason: HOSPADM

## 2021-08-08 RX ORDER — ONDANSETRON 2 MG/ML
4 INJECTION INTRAMUSCULAR; INTRAVENOUS EVERY 6 HOURS PRN
Status: DISCONTINUED | OUTPATIENT
Start: 2021-08-08 | End: 2021-08-11 | Stop reason: HOSPADM

## 2021-08-08 RX ORDER — LANOLIN ALCOHOL/MO/W.PET/CERES
3 CREAM (GRAM) TOPICAL NIGHTLY PRN
Status: DISCONTINUED | OUTPATIENT
Start: 2021-08-08 | End: 2021-08-11 | Stop reason: HOSPADM

## 2021-08-08 RX ORDER — DIPHENHYDRAMINE HYDROCHLORIDE 50 MG/ML
25 INJECTION INTRAMUSCULAR; INTRAVENOUS ONCE
Status: COMPLETED | OUTPATIENT
Start: 2021-08-08 | End: 2021-08-08

## 2021-08-08 RX ADMIN — DIPHENHYDRAMINE HYDROCHLORIDE 25 MG: 50 INJECTION, SOLUTION INTRAMUSCULAR; INTRAVENOUS at 15:39

## 2021-08-08 RX ADMIN — INSULIN LISPRO 6 UNITS: 100 INJECTION, SOLUTION INTRAVENOUS; SUBCUTANEOUS at 22:33

## 2021-08-08 RX ADMIN — GABAPENTIN 300 MG: 300 CAPSULE ORAL at 22:32

## 2021-08-08 RX ADMIN — INSULIN HUMAN 10 UNITS: 100 INJECTION, SOLUTION PARENTERAL at 18:25

## 2021-08-08 RX ADMIN — FLUORESCEIN SODIUM 1 MG: 1 STRIP OPHTHALMIC at 15:33

## 2021-08-08 RX ADMIN — SODIUM CHLORIDE 500 MG: 0.9 INJECTION, SOLUTION INTRAVENOUS at 20:20

## 2021-08-08 RX ADMIN — METOCLOPRAMIDE HYDROCHLORIDE 10 MG: 5 INJECTION INTRAMUSCULAR; INTRAVENOUS at 15:39

## 2021-08-08 RX ADMIN — INSULIN GLARGINE 30 UNITS: 100 INJECTION, SOLUTION SUBCUTANEOUS at 20:15

## 2021-08-08 RX ADMIN — TETRACAINE HYDROCHLORIDE 1 DROP: 5 SOLUTION OPHTHALMIC at 15:33

## 2021-08-08 RX ADMIN — CLONAZEPAM 0.5 MG: 0.5 TABLET ORAL at 22:32

## 2021-08-08 RX ADMIN — OXYCODONE AND ACETAMINOPHEN 1 TABLET: 7.5; 325 TABLET ORAL at 22:32

## 2021-08-08 RX ADMIN — SODIUM CHLORIDE 500 ML: 9 INJECTION, SOLUTION INTRAVENOUS at 17:15

## 2021-08-08 RX ADMIN — TRAZODONE HYDROCHLORIDE 100 MG: 50 TABLET ORAL at 22:32

## 2021-08-08 RX ADMIN — IOPAMIDOL 75 ML: 755 INJECTION, SOLUTION INTRAVENOUS at 17:07

## 2021-08-08 RX ADMIN — ATORVASTATIN CALCIUM 20 MG: 10 TABLET, FILM COATED ORAL at 22:32

## 2021-08-08 ASSESSMENT — ENCOUNTER SYMPTOMS
CHEST TIGHTNESS: 0
EYE REDNESS: 1
NAUSEA: 0
PHOTOPHOBIA: 1
EYE PAIN: 1
SHORTNESS OF BREATH: 0
BACK PAIN: 0
ABDOMINAL PAIN: 0
COUGH: 0
DIARRHEA: 0
COLOR CHANGE: 0
EYE ITCHING: 0
EYE DISCHARGE: 1
CONSTIPATION: 0
RESPIRATORY NEGATIVE: 1
VOMITING: 0

## 2021-08-08 ASSESSMENT — VISUAL ACUITY
OD: 20/30
OS: 20/30
OU: 20/30

## 2021-08-08 ASSESSMENT — PAIN SCALES - GENERAL
PAINLEVEL_OUTOF10: 6
PAINLEVEL_OUTOF10: 8
PAINLEVEL_OUTOF10: 7
PAINLEVEL_OUTOF10: 8

## 2021-08-08 NOTE — ED PROVIDER NOTES
[address]        Pt Name: Jade Franco  MRN: [id number]  [place] 1960  Date of evaluation: 8/8/2021  Provider: RANGEL Ledesma  PCP: Catalina Austin  Note Started: 4:26 PM EDT        I have seen and evaluated this patient with my supervising physician Getachew Phelan MD.    [address] [place] [address]       Chief Complaint   Patient presents with    Eye Pain     right eye since yesterday hurts       HISTORY OF PRESENT ILLNESS   (Location, Timing/Onset, Context/Setting, Quality, Duration, Modifying Factors, Severity, Associated Signs and Symptoms)  Note limiting factors. Chief Complaint: Right eye pain. Jade Franco is a 57 y.o. female with past medical history of CAD, breast cancer with documented brain mets, depression, hypertension, hyperlipidemia, CVA/TIA who presents to the ED with complaint of right eye pain. Patient states she has had pain to her right eye with associated watery drainage and some redness that began yesterday. States she is associated right-sided headache and right temple pain. Patient states pain is described as an aching rated 8/10. Patient denies any double vision or blurry vision. Denies any nausea, vomiting, rhinorrhea, congestion, injury/trauma or previous surgery to this thigh. Patient denies any foreign body sensation. Denies any problems with the eyes in the past.  Patient states she wanted to come yesterday but could not find her way here so came today for further evaluation and treatment. Nursing Notes were all reviewed and agreed with or any disagreements were addressed in the HPI. REVIEW OF SYSTEMS    (2-9 systems for level 4, 10 or more for level 5)     Review of Systems   Constitutional: Negative for activity change, appetite change, chills, diaphoresis, fatigue and fever. Eyes: Positive for photophobia, pain, discharge and redness. Negative for itching and visual disturbance.    Respiratory: Negative. Negative for cough, chest tightness and shortness of breath. Cardiovascular: Negative. Negative for chest pain, palpitations and leg swelling. Gastrointestinal: Negative for abdominal pain, constipation, diarrhea, nausea and vomiting. Genitourinary: Negative for decreased urine volume, difficulty urinating, dysuria, flank pain, frequency, hematuria and urgency. Musculoskeletal: Negative for arthralgias, back pain, myalgias, neck pain and neck stiffness. Skin: Negative for color change, pallor, rash and wound. Neurological: Positive for headaches. Negative for dizziness, tremors, seizures, syncope, facial asymmetry, speech difficulty, weakness, light-headedness and numbness. Positives and Pertinent negatives as per HPI. Except as noted above in the ROS, all other systems were reviewed and negative. PAST MEDICAL HISTORY     Past Medical History:   Diagnosis Date    Abnormal brain MRI 7/20/2017    Partially empty sella and minimal chronic small vessel ischemic disease    Acute bilateral low back pain without sciatica 11/2/2016    SHARRON [facility]acute kidney injury) ([facility] [address] 7/5/2017    Arthritis     back    Bipolar disorder ([facility] [address] [facility] 10/18/2008    CAD (coronary artery disease)     stent placed 6/8/20    Cancer [facility]) 2015    bilateral breast:s/p lumpectomy/radiation:under care care of breast specialist:Dr. Boone     Carotid stenosis, bilateral:<50%:per [place] 7/2016 7/15/2016    Carpal tunnel syndrome 10/18/2008    Cervical cancer screening 2014    Nml per pt'.     Coronary artery disease of native artery of native heart with stable angina pectoris ([facility] [address] [facility] 6/9/2020    DDD (degenerative disc disease), lumbar 7/18/2018    Depression     under care of pschiatrist:Dr. 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  Scoliosis     Spondylosis of lumbar region without myelopathy or radiculopathy 3/10/2017    Transient cerebral ischemia 07/15/2016    TIA:7/10/16    Unspecified cerebral artery occlusion with cerebral infarction     TIA         SURGICAL HISTORY     Past Surgical History:   Procedure Laterality Date    BREAST LUMPECTOMY  2015    Bilateral:breast cancer    CARDIAC CATHETERIZATION  06/08/2020    Dr. Jordon Jorge), DAYANA to Diag 1    COLONOSCOPY N/A 2/1/2021    COLONOSCOPY DIAGNOSTIC performed by Alvin Mccall MD at [place] [address]  2/3/2021    CT BONE MARROW BIOPSY 2/3/2021 Barrie Espana MD [facility] CT SCAN    HYSTERECTOMY      Benign:no cervical cancer per pt'    KIDNEY REMOVAL      right    OTHER SURGICAL HISTORY Right     orif right ankle    TUBAL LIGATION      UPPER GASTROINTESTINAL ENDOSCOPY N/A 1/29/2021    EGD BIOPSY performed by Alvin Mccall MD at [address] [place] [address]       Current Discharge Medication List      CONTINUE these medications which have NOT CHANGED    Details   TRUE METRIX BLOOD GLUCOSE TEST strip USE AS DIRECTED TO TEST 4 TIMES A DAY  Qty: 200 strip, Refills: 5    Associated Diagnoses: Uncontrolled type 2 diabetes mellitus with microalbuminuria, with long-term current use of insulin (HCC)      Continuous Blood Gluc  (FREESTYLE CHRISTY 2 READER) GINA Use for personal CGMS. On Multiple insulin shots, checks blood sugars 4 times per day and requires frequent insulin adjustment.   Qty: 1 each, Refills: 0    Associated Diagnoses: Uncontrolled type 2 diabetes mellitus with microalbuminuria, with long-term current use of insulin ([address] Type 2 diabetes mellitus with other diabetic kidney complication (HCC)      Continuous Blood Gluc Sensor (FREESTYLE CHRISTY 2 SENSOR) MISC Replace every 2 weeks  Qty: 2 each, Refills: 5    Associated Diagnoses: Uncontrolled type 2 diabetes mellitus with microalbuminuria, with long-term current use of insulin ([address] Type 2 diabetes mellitus with other diabetic kidney complication (HCC)      VICTOZA 18 MG/3ML SOPN SC injection INJECT 1.8 MG UNDER THE SKIN ONCE DAILY      Blood Glucose Monitoring Suppl (TRUE METRIX METER) w/Device KIT Used to  check blood sugar 4 times eyad  Qty: 1 kit, Refills: 1      gabapentin (NEURONTIN) 600 MG tablet Take 0.5 tablets by mouth 2 times daily for 3 days. Qty: 90 tablet, Refills: 3      atorvastatin (LIPITOR) 20 MG tablet Take 1 tablet by mouth daily  Qty: 30 tablet, Refills: 5      paliperidone (INVEGA) 9 MG extended release tablet Take 9 mg by mouth every morning       pantoprazole (PROTONIX) 40 MG tablet Take 40 mg by mouth daily       ferrous sulfate (IRON 325) 325 (65 Fe) MG tablet Take 325 mg by mouth daily (with breakfast)      oxyCODONE-acetaminophen (PERCOCET) 7.5-325 MG per tablet TAKE 1 TABLET BY MOUTH EVERY 6 (SIX) HOURS AS NEEDED FOR UP TO 28 DAYS. clonazePAM (KLONOPIN) 0.5 MG tablet Take 0.5 mg by mouth 3 times daily as needed. ticagrelor (BRILINTA) 90 MG TABS tablet Take 1 tablet by mouth 2 times daily  Qty: 60 tablet, Refills: 1      insulin glargine (LANTUS SOLOSTAR) 100 UNIT/ML injection pen Inject 25 Units into the skin every morning  Qty: 1 pen, Refills: 1    Associated Diagnoses: Uncontrolled type 2 diabetes mellitus with microalbuminuria, with long-term current use of insulin ([facility])      nitroGLYCERIN (NITROSTAT) 0.4 MG SL tablet up to max of 3 total doses. If no relief after 1 dose, call 911.   Qty: 25 tablet, Refills: 3      cetirizine (ZYRTEC) 10 MG tablet Take 10 mg by mouth daily      calcium carbonate-vitamin D (CALTRATE) 600-400 MG-UNIT TABS per tab Take 1 tablet by mouth daily       aspirin 81 MG tablet Take 81 mg by mouth daily      traZODone (DESYREL) 100 MG tablet Take 100 mg by mouth nightly      lisinopril (PRINIVIL;ZESTRIL) 40 MG tablet Take 0.5 tablets by mouth daily  Qty: 90 tablet, Refills: 1               ALLERGIES     Morphine, Penicillins, Codeine, and Penicillin g    FAMILYHISTORY       Family History   Problem Relation Age of Onset    Cancer Mother         breast    Cancer Father     Heart Failure Neg Hx     High Cholesterol Neg Hx     Hypertension Neg Hx     Migraines Neg Hx     Rashes/Skin Problems Neg Hx     Seizures Neg Hx     Stroke Neg Hx     Thyroid Disease Neg Hx     Diabetes Neg Hx           SOCIAL HISTORY       Social History     Tobacco Use    Smoking status: Former Smoker     Packs/day: 0.50     Years: 20.00     Pack years: 10.00     Types: Cigarettes, Cigars     Quit date: 7/3/2014     Years since quittin.1    Smokeless tobacco: Never Used   Vaping Use    Vaping Use: Never used   Substance Use Topics    Alcohol use: No     Alcohol/week: 0.0 standard drinks    Drug use: No       SCREENINGS    [place] Coma Scale  Eye Opening: Spontaneous  Best Verbal Response: Oriented  Best Motor Response: Obeys commands  [place] Coma Scale Score: 15        PHYSICAL EXAM    (up to 7 for level 4, 8 or more for level 5)     ED Triage Vitals [21 1346]   BP Temp Temp Source Pulse Resp SpO2 Height Weight   (!) 176/68 98.7 °F (37.1 °C) Oral 50 16 100 % 5' 3\" (1.6 m) 129 lb (58.5 kg)       Physical Exam  Constitutional:       General: She is not in acute distress. Appearance: Normal appearance. She is well-developed. She is not ill-appearing, toxic-appearing or diaphoretic. HENT:      Head: Normocephalic and atraumatic. Comments: Atraumatic. No raccoon eyes or brown sign.      Right Ear: External ear normal.      Left Ear: External ear normal. Eyes:      General:         Right eye: No discharge. Left eye: No discharge. Extraocular Movements: Extraocular movements intact. Pupils: Pupils are equal, round, and reactive to light. Comments: Pupils equal round and reactive bilaterally. No asymmetric pupils. EOMs intact. No nystagmus. No periorbital edema, ecchymoses or erythema noted. Lids unremarkable without any hordeolum or chalazion. Right eye appears that shows conjunctival injection with watery drainage. Left conjunctival unremarkable without any injection or drainage. There is no subconjunctival hemorrhage. There is tender palpation over the right globe and right temporal artery. No temporal artery tenderness to the left. Visual fields intact. Visual acuity 20/30 OU, OD and OS. Odin-Pen pressures normal bilaterally. Odin-Pen pressures 15 to the right eye and 18 to the left eye. Funduscopic examination does not show any obvious abnormality difficult secondary to patient's photophobia and discomfort. Stain exam showed no stain uptake. No abrasion, foreign body or ulcer. No dendritic lesions. Cardiovascular:      Rate and Rhythm: Normal rate and regular rhythm. Pulses: Normal pulses. Heart sounds: Normal heart sounds. No murmur heard. No friction rub. No gallop. Pulmonary:      Effort: Pulmonary effort is normal. No respiratory distress. Breath sounds: Normal breath sounds. No stridor. No wheezing, rhonchi or rales. Chest:      Chest wall: No tenderness. Abdominal:      General: Abdomen is flat. There is no distension. Palpations: Abdomen is soft. There is no mass. Tenderness: There is no abdominal tenderness. There is no right CVA tenderness, left CVA tenderness, guarding or rebound. Hernia: No hernia is present. Musculoskeletal:         General: Normal range of motion. Cervical back: Normal range of motion and neck supple. No rigidity or tenderness. All other components within normal limits    Narrative:     Performed at:  [place] [address] [employer]   Phone [phone]   CBC WITH AUTO DIFFERENTIAL - Abnormal; Notable for the following components:    RBC 3.94 (*)     Hemoglobin 11.2 (*)     Hematocrit 34.1 (*)     Lymphocytes Absolute 0.8 (*)     All other components within normal limits    Narrative:     Performed at:  [address], [facility] [employer]   Phone [phone] PANEL - Abnormal; Notable for the following components:    Potassium 5.2 (*)     Glucose 412 (*)     GFR Non- 45 (*)     GFR  55 (*)     All other components within normal limits    Narrative:     Performed at:  [place] [address] [employer]   Phone [phone]   POCT GLUCOSE - Abnormal; Notable for the following components:    POC Glucose 291 (*)     All other components within normal limits    Narrative:     Performed at:  [place] [address] [facility]   Phone [phone]   POCT GLUCOSE - Abnormal; Notable for the following components:    POC Glucose 433 (*)     All other components within normal limits    Narrative:     Performed at:  [place] [address] [employer]   Phone [phone]   POCT GLUCOSE - Abnormal; Notable for the following components:    POC Glucose 344 (*)     All other components within normal limits    Narrative:     Performed at:  [place] [address] [employer]   Phone [phone]   POCT GLUCOSE - Normal   PROTIME-INR    Narrative:     Performed at:  [address], [facility] [employer]   Phone [phone]   APTT    Narrative: Performed at:  [facility]) [address], [address] [place] [address] [employer]   Phone [phone]   MAGNESIUM    Narrative:     Performed at:  [facility]) [address], [address] [place] [address]   Phone [phone]   C-REACTIVE PROTEIN   HEMOGLOBIN A1C   POCT GLUCOSE   POCT GLUCOSE   POCT GLUCOSE   POCT GLUCOSE   POCT GLUCOSE   POCT GLUCOSE   POCT GLUCOSE   POCT GLUCOSE   POCT GLUCOSE   POCT GLUCOSE       When ordered only abnormal lab results are displayed. All other labs were within normal range or not returned as of this dictation. EKG: When ordered, EKG's are interpreted by the Emergency Department Physician in the absence of a cardiologist.  Please see their note for interpretation of EKG. RADIOLOGY:   Non-plain film images such as CT, Ultrasound and MRI are read by the radiologist. Plain radiographic images are visualized and preliminarily interpreted by the ED Provider with the below findings:        Interpretation per the Radiologist below, if available at the time of this note:    CTA HEAD NECK W CONTRAST   Final Result   No flow limiting stenosis or large vessel occlusion detected within the head   or neck. Incidentally noted pulmonary emphysema. CT HEAD WO CONTRAST   Final Result   Minimal chronic microischemic changes scattered in the deep white matter   which is unchanged with no acute abnormality seen. Moderate chronic sinusitis which is most severe along the right maxillary   antrum and is unchanged. No results found.         PROCEDURES   Unless otherwise noted below, none     Procedures    CRITICAL CARE TIME   N/A    CONSULTS:  IP CONSULT TO HOSPITALIST  IP CONSULT TO VASCULAR SURGERY      EMERGENCY DEPARTMENT COURSE and DIFFERENTIAL DIAGNOSIS/MDM:   Vitals:    Vitals:    08/08/21 2310 08/09/21 0015 08/09/21 0400 08/09/21 0600   BP:  134/65 (!) 140/80    Pulse: 52 62 70    Resp:  16 16    Temp:  97.8 °F (36.6 sulfate 1000 mg in dextrose 5% 100 mL IVPB (has no administration in time range)   ondansetron (ZOFRAN-ODT) disintegrating tablet 4 mg (has no administration in time range)     Or   ondansetron (ZOFRAN) injection 4 mg (has no administration in time range)   acetaminophen (TYLENOL) tablet 650 mg (has no administration in time range)     Or   acetaminophen (TYLENOL) suppository 650 mg (has no administration in time range)   enoxaparin (LOVENOX) injection 40 mg (has no administration in time range)   melatonin tablet 3 mg (has no administration in time range)   predniSONE (DELTASONE) tablet 60 mg (has no administration in time range)   tetracaine (TETRAVISC) 0.5 % ophthalmic solution 1 drop (has no administration in time range)   tetracaine (TETRAVISC) 0.5 % ophthalmic solution 1 drop (1 drop Right Eye Given 8/8/21 1533)   fluorescein ophthalmic strip 1 mg (1 mg Right Eye Given 8/8/21 1533)   diphenhydrAMINE (BENADRYL) injection 25 mg (25 mg Intravenous Given 8/8/21 1539)   metoclopramide (REGLAN) injection 10 mg (10 mg Intravenous Given 8/8/21 1539)   iopamidol (ISOVUE-370) 76 % injection 75 mL (75 mLs Intravenous Given 8/8/21 1707)   0.9 % sodium chloride bolus (0 mLs Intravenous Stopped 8/8/21 1919)   insulin regular (HUMULIN R;NOVOLIN R) injection 10 Units (10 Units Intravenous Given 8/8/21 1825)   methylPREDNISolone sodium (SOLU-MEDROL) 500 mg in sodium chloride 0.9 % 250 mL IVPB (0 mg Intravenous Stopped 8/8/21 2127)           Patient is a 48-year-old female who presents to the ED with complaint of a headache with associated right eye pain and right temple pain. Upon examination there does appear to be some watery drainage of the right eye with some slight conjunctival injection. Stain exam showed no stain uptake, abrasion, ulcer or dendritic lesion. Odin-Pen pressures were normal.  Funduscopic examination limited secondary to patient's photophobia and discomfort. IV was established and blood work obtained. Coags unremarkable. Sed rate was 51. CMP did show glucose of 564. Patient has normal anion gap and CO2. Was given fluids here in the ED. Urinalysis showed greater than thousand glucose but no signs of ketones. CT and CTA of the head/neck obtained. CTs are currently pending at end of shift. Will sign out to attending provider given end of shift. Please see attending provider note for further disposition and treatment of patient. FINAL IMPRESSION      1. Temporal arteritis ([facility] [address]    2. Type 2 diabetes mellitus with hyperglycemia, with long-term current use of insulin ([facility] [address]          DISPOSITION/PLAN   DISPOSITION Admitted 08/08/2021 07:59:06 PM      PATIENT REFERRED TO:  No follow-up provider specified.     DISCHARGE MEDICATIONS:  Current Discharge Medication List          DISCONTINUED MEDICATIONS:  Current Discharge Medication List                 (Please note that portions of this note were completed with a voice recognition program.  Efforts were made to edit the dictations but occasionally words are mis-transcribed.)    RANGEL Álvarez (electronically signed)         RANGEL Littlejohn  08/09/21 [id number]

## 2021-08-08 NOTE — ED PROVIDER NOTES
I independently performed a history and physical on Geena Seen. All diagnostic, treatment, and disposition decisions were made by myself in conjunction with the advanced practice provider. EKG interpretation:  Sinus bradycardia, rate forty-five, leftward axis, QTC within normal limits, no acute ST or T wave changes from prior on June 2, 2021    Patient with symptoms of temporal arteritis. Criteria include advanced age, new onset headache, tenderness over the right temporal artery, ESR greater than 50. She has significant diabetic hyperglycemia but is not in DKA at this time. However her poorly controlled diabetes will become a major issue as we treat her with high-dose intravenous steroids pending confirmation of her diagnosis. Patient initially started on 500 mg of Solu-Medrol with plans to recheck glucose and administer another 500 mg this evening. Admitted to the hospitalist service. Critical Care Time: 30min. Management of diabetic hyperglycemia with intravenous insulin, management of probable temporal arteritis with high-dose IV steroids to avoid permanent vision loss, and also coordination of admission to the hospital.    For further details of [facility] emergency department encounter, please see Chato MULTANI's documentation.              Viki Liz MD  08/08/21 2045

## 2021-08-08 NOTE — H&P
Hospital Medicine History & Physical      Patient:  Magy Orellana  :   1960  MRN:   [id number]  Date of Service: 21    Chief Complaint   Patient presents with    Eye Pain     right eye since yesterday hurts       HISTORY OF PRESENT ILLNESS:    Magy Orellana is a 57 y.o. female. She presents reporting right eye pain since yesterday. Also describes pain along the supraorbital ridge, maxilla, and preauricular area. Describes her vision as blurry and eye has been watering. She denies diplopia and pain elsewhere. Denies jaw claudication. Review of Systems:  All pertinent positives and negatives are as noted in the HPI section. All other systems were reviewed and are negative. Past Medical History:   Diagnosis Date    Abnormal brain MRI 2017    Partially empty sella and minimal chronic small vessel ischemic disease    Acute bilateral low back pain without sciatica 2016    SHARRON (acute kidney injury) ([address] 2017    Arthritis     back    Bipolar disorder ([facility] 10/18/2008    CAD (coronary artery disease)     stent placed 20    Cancer [facility])     bilateral breast:s/p lumpectomy/radiation:under care care of breast specialist:Dr. Boone     Carotid stenosis, bilateral:<50%:per US 2016 7/15/2016    Carpal tunnel syndrome 10/18/2008    Cervical cancer screening 2014    Nml per pt'.     Coronary artery disease of native artery of native heart with stable angina pectoris ([address] 2020    DDD (degenerative disc disease), lumbar 2018    Depression     under care of pschiatrist:Dr. 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 hospitalization 5/12/16-5/16/16:under care of GI:chronic pancreatitis    S/P endoscopy 6/14/2016    [facility]:per pt' & her family member was nml.  Scoliosis     Spondylosis of lumbar region without myelopathy or radiculopathy 3/10/2017    Transient cerebral ischemia 07/15/2016    TIA:7/10/16    Unspecified cerebral artery occlusion with cerebral infarction     TIA       Past Surgical History:   Procedure Laterality Date    BREAST LUMPECTOMY  2015    Bilateral:breast cancer    CARDIAC CATHETERIZATION  06/08/2020    Dr. Elijah Moon), DAYANA to Diag 1    COLONOSCOPY N/A 2/1/2021    COLONOSCOPY DIAGNOSTIC performed by Obed Elias MD at [facility]  2/3/2021    CT BONE MARROW BIOPSY 2/3/2021 Skyler Ceja MD [facility] CT SCAN    HYSTERECTOMY      Benign:no cervical cancer per pt'    KIDNEY REMOVAL      right    OTHER SURGICAL HISTORY Right     orif right ankle    TUBAL LIGATION      UPPER GASTROINTESTINAL ENDOSCOPY N/A 1/29/2021    EGD BIOPSY performed by Obed Elias MD at [address]         Prior to Admission medications    Medication Sig Start Date End Date Taking? Authorizing Provider   TRUE METRIX BLOOD GLUCOSE TEST strip USE AS DIRECTED TO TEST 4 TIMES A DAY 7/19/21  Yes Anni Nath MD   Continuous Blood Gluc  (FREESTYLE CHRISTY 2 READER) GINA Use for personal CGMS. On Multiple insulin shots, checks blood sugars 4 times per day and requires frequent insulin adjustment.  6/16/21  Yes Anni Nath MD   Continuous Blood Gluc Sensor (FREESTYLE CHRISTY 2 SENSOR) MISC Replace every 2 weeks 6/16/21  Yes Anni Nath MD   VICTOZA 18 MG/3ML SOPN SC injection INJECT 1.8 MG UNDER THE SKIN ONCE DAILY 6/8/21  Yes Historical Provider, MD   Blood Glucose Monitoring Suppl (TRUE METRIX METER) w/Device KIT Used to  check blood sugar 4 times eyad 6/9/21  Yes Navya Garduno Radha Reed MD   gabapentin (NEURONTIN) 600 MG tablet Take 0.5 tablets by mouth 2 times daily for 3 days. 6/4/21 8/8/21 Yes Camelia Bumpers, MD   atorvastatin (LIPITOR) 20 MG tablet Take 1 tablet by mouth daily 5/20/21  Yes Jerrell Najera MD   paliperidone (INVEGA) 9 MG extended release tablet Take 9 mg by mouth every morning  3/15/21  Yes Historical Provider, MD   pantoprazole (PROTONIX) 40 MG tablet Take 40 mg by mouth daily  4/3/21  Yes Historical Provider, MD   ferrous sulfate (IRON 325) 325 (65 Fe) MG tablet Take 325 mg by mouth daily (with breakfast)   Yes Historical Provider, MD   oxyCODONE-acetaminophen (PERCOCET) 7.5-325 MG per tablet TAKE 1 TABLET BY MOUTH EVERY 6 (SIX) HOURS AS NEEDED FOR UP TO 28 DAYS. 3/11/21  Yes Historical Provider, MD   clonazePAM (KLONOPIN) 0.5 MG tablet Take 0.5 mg by mouth 3 times daily as needed. 3/15/21  Yes Historical Provider, MD   ticagrelor (BRILINTA) 90 MG TABS tablet Take 1 tablet by mouth 2 times daily 2/3/21  Yes Chelsey Vickers MD   insulin glargine (LANTUS SOLOSTAR) 100 UNIT/ML injection pen Inject 25 Units into the skin every morning 10/8/20  Yes Star Feldman MD   nitroGLYCERIN (NITROSTAT) 0.4 MG SL tablet up to max of 3 total doses. If no relief after 1 dose, call 911. 7/11/20  Yes Marcos Magallanes MD   cetirizine (ZYRTEC) 10 MG tablet Take 10 mg by mouth daily   Yes Historical Provider, MD   calcium carbonate-vitamin D (CALTRATE) 600-400 MG-UNIT TABS per tab Take 1 tablet by mouth daily  12/30/19  Yes Historical Provider, MD   aspirin 81 MG tablet Take 81 mg by mouth daily   Yes Historical Provider, MD   traZODone (DESYREL) 100 MG tablet Take 100 mg by mouth nightly   Yes Historical Provider, MD   lisinopril (PRINIVIL;ZESTRIL) 40 MG tablet Take 0.5 tablets by mouth daily 6/4/21   Camelia Bumpers, MD       Allergies:   Morphine, Penicillins, Codeine, and Penicillin g    Social:   reports that she quit smoking about 7 years ago.  Her smoking use included cigarettes and cigars. She has a 10.00 pack-year smoking history. She has never used smokeless tobacco.   reports no history of alcohol use. Social History     Substance and Sexual Activity   Drug Use No       Family History   Problem Relation Age of Onset    Cancer Mother         breast    Cancer Father     Heart Failure Neg Hx     High Cholesterol Neg Hx     Hypertension Neg Hx     Migraines Neg Hx     Rashes/Skin Problems Neg Hx     Seizures Neg Hx     Stroke Neg Hx     Thyroid Disease Neg Hx     Diabetes Neg Hx        PHYSICAL EXAM:  I performed this physical examination. Vitals:  Patient Vitals for the past 24 hrs:   BP Temp Temp src Pulse Resp SpO2 Height Weight   08/08/21 1745 (!) 150/98 -- -- 74 16 100 % -- --   08/08/21 1534 (!) 173/51 -- -- 68 16 100 % -- --   08/08/21 1346 (!) 176/68 98.7 °F (37.1 °C) Oral 50 16 100 % 5' 3\" (1.6 m) 129 lb (58.5 kg)     GEN:  Appearance:  Age appropriate female in NAD . Level of Consciousness:  alert . Orientation:  full    HEENT: Sclera anicteric. Right conjunctival injection and chemosis present. PERRL. EOMI. Tender to preauricular palpation as well as palpation over the ostia of the supraorbital and infraorbital nerves. Moist mucus membranes. no specific or diagnostic oral lesions. NECK:  no signs of meningismus. Jugular veins not distended. Carotid pulses  2+.  no cervical lymphadenopathy. no thyromegaly. CV:  regular rhythm. normal S1 & S2.    no murmur. no rub.  no gallop. PULM:  Chest excursion is symmetric. Breath sounds are vesicular. Adventitious sounds:  none    AB:  Abdominal shape is normal.  Bowel sounds are active. Generally soft to palpation. no tenderness is present. no involuntary guarding. no rebound guarding. EXTR:  Skin is warm. Capillary refill brisk. no specific or pathognomic rash. no clubbing. no pitting edema. no active wound or ulcer.     LABS:  Lab Results Component Value Date    WBC 5.1 08/08/2021    HGB 11.2 (L) 08/08/2021    HCT 33.6 (L) 08/08/2021    MCV 86.5 08/08/2021     08/08/2021     Lab Results   Component Value Date    CREATININE 1.3 (H) 08/08/2021    BUN 19 08/08/2021     (L) 08/08/2021    K 4.4 08/08/2021    CL 93 (L) 08/08/2021    CO2 28 08/08/2021     Lab Results   Component Value Date    ALT 78 (H) 08/08/2021    AST 35 08/08/2021     (H) 06/03/2021    ALKPHOS 220 (H) 08/08/2021    BILITOT <0.2 08/08/2021     Lab Results   Component Value Date    CKTOTAL 120 06/03/2021    TROPONINI <0.01 06/02/2021     No results for input(s): PHART, SAQ3KKP, PO2ART in the last 72 hours. IMAGING:  CT HEAD WO CONTRAST    Result Date: 8/8/2021  EXAMINATION: CT OF THE HEAD WITHOUT CONTRAST  8/8/2021 4:22 pm TECHNIQUE: CT of the head was performed without the administration of intravenous contrast. Dose modulation, iterative reconstruction, and/or weight based adjustment of the mA/kV was utilized to reduce the radiation dose to as low as reasonably achievable. COMPARISON: 06/02/2021 HISTORY: ORDERING SYSTEM PROVIDED HISTORY: HA - eye pain TECHNOLOGIST PROVIDED HISTORY: Has a \"code stroke\" or \"stroke alert\" been called? ->No Reason for exam:->HA - eye pain Decision Support Exception - unselect if not a suspected or confirmed emergency medical condition->Emergency Medical Condition (MA) Reason for Exam: pt c/o pain behind right eye,headache since yesterday Acuity: Acute Type of Exam: Initial Relevant Medical/Surgical History: hx of TIA FINDINGS: BRAIN/VENTRICLES: The ventricles are normal.  No intracranial hemorrhage or edema is seen. There is mild periventricular low density bilaterally which is unchanged. There are basal ganglia calcifications bilaterally which are unchanged. There is no extra-axial fluid collection or mass. ORBITS: The visualized portion of the orbits demonstrate no acute abnormality.  SINUSES: There is moderate mucosal thickening throughout the ethmoid air cells extending throughout the right maxillary sinus which is unchanged. There are no air-fluid levels. The mastoid air cells demonstrate no acute abnormality. SOFT TISSUES/SKULL:  No acute abnormality of the visualized skull or soft tissues. Minimal chronic microischemic changes scattered in the deep white matter which is unchanged with no acute abnormality seen. Moderate chronic sinusitis which is most severe along the right maxillary antrum and is unchanged. CTA HEAD NECK W CONTRAST    Result Date: 8/8/2021  EXAMINATION: CTA OF THE HEAD AND NECK WITH CONTRAST 8/8/2021 4:21 pm: TECHNIQUE: CTA of the head and neck was performed with the administration of intravenous contrast. Multiplanar reformatted images are provided for review. MIP images are provided for review. Stenosis of the internal carotid arteries measured using NASCET criteria. Dose modulation, iterative reconstruction, and/or weight based adjustment of the mA/kV was utilized to reduce the radiation dose to as low as reasonably achievable. COMPARISON: None. HISTORY: ORDERING SYSTEM PROVIDED HISTORY: HA TECHNOLOGIST PROVIDED HISTORY: Reason for exam:->HA Decision Support Exception - unselect if not a suspected or confirmed emergency medical condition->Emergency Medical Condition (MA) Reason for Exam: pt c/o pain behind right eye and headache x 2 days Acuity: Acute Type of Exam: Initial Relevant Medical/Surgical History: hx of TIA FINDINGS: CTA NECK: AORTIC ARCH/ARCH VESSELS: No dissection or arterial injury. No significant stenosis of the brachiocephalic or subclavian arteries. CAROTID ARTERIES: No dissection, arterial injury, or hemodynamically significant stenosis by NASCET criteria. VERTEBRAL ARTERIES: No dissection, arterial injury, or significant stenosis. SOFT TISSUES: Paraseptal emphysematous changes of the lungs are noted. BONES: No acute osseous abnormality.  CTA HEAD: ANTERIOR CIRCULATION: No significant stenosis of the intracranial internal carotid, anterior cerebral, or middle cerebral arteries. No aneurysm. There are bilateral posterior communicating arteries. A fenestrated A1 segment of the right anterior cerebral artery is noted. POSTERIOR CIRCULATION: No significant stenosis of the vertebral, basilar, or posterior cerebral arteries. No aneurysm. OTHER: No dural venous sinus thrombosis on this non-dedicated study. There is opacification of the right maxillary sinus and partial opacification of the ethmoid air cells. BRAIN: See separately dictated noncontrast head CT report. No flow limiting stenosis or large vessel occlusion detected within the head or neck. Incidentally noted pulmonary emphysema. I directly reviewed all recent imaging studies as well as pertinent prior studies. Radiology reports may or may not be available at the time of my review. \    EKG:  New and pertinent prior tracings were directly reviewed. My interpretation is as follows:  Sinus bradycardia    Active Hospital Problems    Diagnosis Date Noted    Acute right eye pain [H57.11] 08/08/2021    CAD S/P percutaneous coronary angioplasty [I25.10, Z98.61] 06/09/2020    Stage 3b chronic kidney disease ([facility] [N18.32] 09/17/2017    Uncontrolled type 2 diabetes mellitus with microalbuminuria, with long-term current use of insulin (HCC) [E11.29, E11.65, R80.9, Z79.4] 09/01/2017    Essential hypertension [I10] 06/17/2016       ASSESSMENT & PLAN  Right Eye Pain  -  Per ER exam visual acuity is essentially normal (20/30) and symmetric and intraocular pressure by tonometry also normal.  -  Agree with possible right temporal arteritis. Received 500mg IV solumedrol in the ER. Continue with prednisone 60mg daily while evaluating.  -  Vascular surgery assistance requested for biopsy. -  ASA and ticagrelor on hold to facilitate biopsy. CAD s/p PCI  -  Most recently DAYANA to Marysol Troncoso. 6/8/20.   ASA and statin on hold to facilitate temporal artery biopsy.  -  Continue home statin. CKD 3b, HTN   -  Continue home lisinopril 20mg daily. DM2  -  Hold all oral antidiabetic agents. Start s.c. Insulin regimen based on home regimen. Anticipated to require significantly higher total daily insulin than usual with the use of high dose steroids. Bipolar D/O  -  Continue home trazodone, prn clonazepam, and paliperidone. DVT prophylaxis: SCDs, lovenox  Code Status:  Full  Disposition:  Inpatient. Anticipate eventual d/c to home.     Shauna Bhatia MD MD

## 2021-08-09 ENCOUNTER — ANESTHESIA EVENT (OUTPATIENT)
Dept: OPERATING ROOM | Age: 61
DRG: 346 | End: 2021-08-09
Payer: MEDICAID

## 2021-08-09 ENCOUNTER — ANESTHESIA (OUTPATIENT)
Dept: OPERATING ROOM | Age: 61
DRG: 346 | End: 2021-08-09
Payer: MEDICAID

## 2021-08-09 VITALS — OXYGEN SATURATION: 100 % | SYSTOLIC BLOOD PRESSURE: 103 MMHG | DIASTOLIC BLOOD PRESSURE: 53 MMHG

## 2021-08-09 PROBLEM — Z79.4 TYPE 2 DIABETES MELLITUS WITH HYPERGLYCEMIA, WITH LONG-TERM CURRENT USE OF INSULIN (HCC): Status: ACTIVE | Noted: 2020-10-07

## 2021-08-09 PROBLEM — E11.65 TYPE 2 DIABETES MELLITUS WITH HYPERGLYCEMIA, WITH LONG-TERM CURRENT USE OF INSULIN (HCC): Status: ACTIVE | Noted: 2020-10-07

## 2021-08-09 LAB
ANION GAP SERPL CALCULATED.3IONS-SCNC: 10 MMOL/L (ref 3–16)
BASOPHILS ABSOLUTE: 0 K/UL (ref 0–0.2)
BASOPHILS RELATIVE PERCENT: 0.2 %
BUN BLDV-MCNC: 20 MG/DL (ref 7–20)
C-REACTIVE PROTEIN: <3 MG/L (ref 0–5.1)
CALCIUM SERPL-MCNC: 9.5 MG/DL (ref 8.3–10.6)
CHLORIDE BLD-SCNC: 101 MMOL/L (ref 99–110)
CO2: 25 MMOL/L (ref 21–32)
CREAT SERPL-MCNC: 1.2 MG/DL (ref 0.6–1.2)
EOSINOPHILS ABSOLUTE: 0 K/UL (ref 0–0.6)
EOSINOPHILS RELATIVE PERCENT: 0 %
ESTIMATED AVERAGE GLUCOSE: 398.2 MG/DL
GFR AFRICAN AMERICAN: 55
GFR NON-AFRICAN AMERICAN: 45
GLUCOSE BLD-MCNC: 215 MG/DL (ref 70–99)
GLUCOSE BLD-MCNC: 320 MG/DL (ref 70–99)
GLUCOSE BLD-MCNC: 342 MG/DL (ref 70–99)
GLUCOSE BLD-MCNC: 344 MG/DL (ref 70–99)
GLUCOSE BLD-MCNC: 402 MG/DL (ref 70–99)
GLUCOSE BLD-MCNC: 412 MG/DL (ref 70–99)
HBA1C MFR BLD: 15.5 %
HCT VFR BLD CALC: 34.1 % (ref 36–48)
HEMOGLOBIN: 11.2 G/DL (ref 12–16)
LYMPHOCYTES ABSOLUTE: 0.8 K/UL (ref 1–5.1)
LYMPHOCYTES RELATIVE PERCENT: 18.4 %
MAGNESIUM: 2 MG/DL (ref 1.8–2.4)
MCH RBC QN AUTO: 28.4 PG (ref 26–34)
MCHC RBC AUTO-ENTMCNC: 32.8 G/DL (ref 31–36)
MCV RBC AUTO: 86.6 FL (ref 80–100)
MONOCYTES ABSOLUTE: 0 K/UL (ref 0–1.3)
MONOCYTES RELATIVE PERCENT: 0.8 %
NEUTROPHILS ABSOLUTE: 3.6 K/UL (ref 1.7–7.7)
NEUTROPHILS RELATIVE PERCENT: 80.6 %
PDW BLD-RTO: 12.6 % (ref 12.4–15.4)
PERFORMED ON: ABNORMAL
PLATELET # BLD: 139 K/UL (ref 135–450)
PMV BLD AUTO: 9.7 FL (ref 5–10.5)
POTASSIUM SERPL-SCNC: 5.2 MMOL/L (ref 3.5–5.1)
RBC # BLD: 3.94 M/UL (ref 4–5.2)
SODIUM BLD-SCNC: 136 MMOL/L (ref 136–145)
WBC # BLD: 4.4 K/UL (ref 4–11)

## 2021-08-09 PROCEDURE — 2500000003 HC RX 250 WO HCPCS: Performed by: SURGERY

## 2021-08-09 PROCEDURE — 6370000000 HC RX 637 (ALT 250 FOR IP): Performed by: INTERNAL MEDICINE

## 2021-08-09 PROCEDURE — 6370000000 HC RX 637 (ALT 250 FOR IP): Performed by: SURGERY

## 2021-08-09 PROCEDURE — 99253 IP/OBS CNSLTJ NEW/EST LOW 45: CPT | Performed by: SURGERY

## 2021-08-09 PROCEDURE — 80048 BASIC METABOLIC PNL TOTAL CA: CPT

## 2021-08-09 PROCEDURE — 6370000000 HC RX 637 (ALT 250 FOR IP): Performed by: ANESTHESIOLOGY

## 2021-08-09 PROCEDURE — 7100000000 HC PACU RECOVERY - FIRST 15 MIN: Performed by: SURGERY

## 2021-08-09 PROCEDURE — 1200000000 HC SEMI PRIVATE

## 2021-08-09 PROCEDURE — 83735 ASSAY OF MAGNESIUM: CPT

## 2021-08-09 PROCEDURE — 7100000001 HC PACU RECOVERY - ADDTL 15 MIN: Performed by: SURGERY

## 2021-08-09 PROCEDURE — 83036 HEMOGLOBIN GLYCOSYLATED A1C: CPT

## 2021-08-09 PROCEDURE — 85025 COMPLETE CBC W/AUTO DIFF WBC: CPT

## 2021-08-09 PROCEDURE — 03BS3ZX EXCISION OF RIGHT TEMPORAL ARTERY, PERCUTANEOUS APPROACH, DIAGNOSTIC: ICD-10-PCS | Performed by: SURGERY

## 2021-08-09 PROCEDURE — 6360000002 HC RX W HCPCS: Performed by: SURGERY

## 2021-08-09 PROCEDURE — 2580000003 HC RX 258: Performed by: NURSE ANESTHETIST, CERTIFIED REGISTERED

## 2021-08-09 PROCEDURE — 3700000000 HC ANESTHESIA ATTENDED CARE: Performed by: SURGERY

## 2021-08-09 PROCEDURE — 88305 TISSUE EXAM BY PATHOLOGIST: CPT

## 2021-08-09 PROCEDURE — 2580000003 HC RX 258: Performed by: SURGERY

## 2021-08-09 PROCEDURE — 3600000017 HC SURGERY HYBRID ADDL 15MIN: Performed by: SURGERY

## 2021-08-09 PROCEDURE — 2580000003 HC RX 258: Performed by: INTERNAL MEDICINE

## 2021-08-09 PROCEDURE — 3700000001 HC ADD 15 MINUTES (ANESTHESIA): Performed by: SURGERY

## 2021-08-09 PROCEDURE — 2500000003 HC RX 250 WO HCPCS: Performed by: NURSE ANESTHETIST, CERTIFIED REGISTERED

## 2021-08-09 PROCEDURE — 3600000007 HC SURGERY HYBRID BASE: Performed by: SURGERY

## 2021-08-09 PROCEDURE — 37609 LIGATION/BX TEMPORAL ARTERY: CPT | Performed by: SURGERY

## 2021-08-09 PROCEDURE — 88313 SPECIAL STAINS GROUP 2: CPT

## 2021-08-09 PROCEDURE — 6370000000 HC RX 637 (ALT 250 FOR IP)

## 2021-08-09 PROCEDURE — 6360000002 HC RX W HCPCS: Performed by: NURSE ANESTHETIST, CERTIFIED REGISTERED

## 2021-08-09 PROCEDURE — 2709999900 HC NON-CHARGEABLE SUPPLY: Performed by: SURGERY

## 2021-08-09 RX ORDER — MEPERIDINE HYDROCHLORIDE 50 MG/ML
12.5 INJECTION INTRAMUSCULAR; INTRAVENOUS; SUBCUTANEOUS EVERY 5 MIN PRN
Status: DISCONTINUED | OUTPATIENT
Start: 2021-08-09 | End: 2021-08-09

## 2021-08-09 RX ORDER — PROMETHAZINE HYDROCHLORIDE 25 MG/ML
6.25 INJECTION, SOLUTION INTRAMUSCULAR; INTRAVENOUS
Status: DISCONTINUED | OUTPATIENT
Start: 2021-08-09 | End: 2021-08-09

## 2021-08-09 RX ORDER — DIPHENHYDRAMINE HYDROCHLORIDE 50 MG/ML
12.5 INJECTION INTRAMUSCULAR; INTRAVENOUS
Status: DISCONTINUED | OUTPATIENT
Start: 2021-08-09 | End: 2021-08-09

## 2021-08-09 RX ORDER — GLYCOPYRROLATE 0.2 MG/ML
INJECTION INTRAMUSCULAR; INTRAVENOUS PRN
Status: DISCONTINUED | OUTPATIENT
Start: 2021-08-09 | End: 2021-08-09 | Stop reason: SDUPTHER

## 2021-08-09 RX ORDER — INSULIN GLARGINE 100 [IU]/ML
50 INJECTION, SOLUTION SUBCUTANEOUS ONCE
Status: COMPLETED | OUTPATIENT
Start: 2021-08-09 | End: 2021-08-09

## 2021-08-09 RX ORDER — PROPOFOL 10 MG/ML
INJECTION, EMULSION INTRAVENOUS PRN
Status: DISCONTINUED | OUTPATIENT
Start: 2021-08-09 | End: 2021-08-09 | Stop reason: SDUPTHER

## 2021-08-09 RX ORDER — LIDOCAINE HYDROCHLORIDE 10 MG/ML
INJECTION, SOLUTION INFILTRATION; PERINEURAL PRN
Status: DISCONTINUED | OUTPATIENT
Start: 2021-08-09 | End: 2021-08-09 | Stop reason: ALTCHOICE

## 2021-08-09 RX ORDER — OXYCODONE HYDROCHLORIDE AND ACETAMINOPHEN 5; 325 MG/1; MG/1
2 TABLET ORAL PRN
Status: DISCONTINUED | OUTPATIENT
Start: 2021-08-09 | End: 2021-08-09

## 2021-08-09 RX ORDER — FLUTICASONE PROPIONATE 50 MCG
1 SPRAY, SUSPENSION (ML) NASAL DAILY
Status: DISCONTINUED | OUTPATIENT
Start: 2021-08-09 | End: 2021-08-11 | Stop reason: HOSPADM

## 2021-08-09 RX ORDER — ONDANSETRON 2 MG/ML
4 INJECTION INTRAMUSCULAR; INTRAVENOUS PRN
Status: DISCONTINUED | OUTPATIENT
Start: 2021-08-09 | End: 2021-08-09

## 2021-08-09 RX ORDER — HYDRALAZINE HYDROCHLORIDE 20 MG/ML
5 INJECTION INTRAMUSCULAR; INTRAVENOUS EVERY 10 MIN PRN
Status: DISCONTINUED | OUTPATIENT
Start: 2021-08-09 | End: 2021-08-09

## 2021-08-09 RX ORDER — OXYCODONE HYDROCHLORIDE AND ACETAMINOPHEN 5; 325 MG/1; MG/1
1 TABLET ORAL PRN
Status: DISCONTINUED | OUTPATIENT
Start: 2021-08-09 | End: 2021-08-09

## 2021-08-09 RX ORDER — INSULIN GLARGINE 100 [IU]/ML
30 INJECTION, SOLUTION SUBCUTANEOUS 2 TIMES DAILY
Status: DISCONTINUED | OUTPATIENT
Start: 2021-08-09 | End: 2021-08-10

## 2021-08-09 RX ORDER — LABETALOL HYDROCHLORIDE 5 MG/ML
5 INJECTION, SOLUTION INTRAVENOUS EVERY 10 MIN PRN
Status: DISCONTINUED | OUTPATIENT
Start: 2021-08-09 | End: 2021-08-09

## 2021-08-09 RX ORDER — CETIRIZINE HYDROCHLORIDE 10 MG/1
10 TABLET ORAL DAILY
Status: DISCONTINUED | OUTPATIENT
Start: 2021-08-09 | End: 2021-08-11 | Stop reason: HOSPADM

## 2021-08-09 RX ORDER — LIDOCAINE HYDROCHLORIDE 20 MG/ML
INJECTION, SOLUTION INFILTRATION; PERINEURAL PRN
Status: DISCONTINUED | OUTPATIENT
Start: 2021-08-09 | End: 2021-08-09 | Stop reason: SDUPTHER

## 2021-08-09 RX ORDER — 0.9 % SODIUM CHLORIDE 0.9 %
1000 INTRAVENOUS SOLUTION INTRAVENOUS ONCE
Status: COMPLETED | OUTPATIENT
Start: 2021-08-09 | End: 2021-08-09

## 2021-08-09 RX ORDER — SODIUM CHLORIDE 9 MG/ML
INJECTION, SOLUTION INTRAVENOUS CONTINUOUS PRN
Status: DISCONTINUED | OUTPATIENT
Start: 2021-08-09 | End: 2021-08-09 | Stop reason: SDUPTHER

## 2021-08-09 RX ADMIN — INSULIN GLARGINE 30 UNITS: 100 INJECTION, SOLUTION SUBCUTANEOUS at 20:56

## 2021-08-09 RX ADMIN — Medication 10 ML: at 20:55

## 2021-08-09 RX ADMIN — INSULIN LISPRO 5 UNITS: 100 INJECTION, SOLUTION INTRAVENOUS; SUBCUTANEOUS at 16:54

## 2021-08-09 RX ADMIN — PROPOFOL 80 MG: 10 INJECTION, EMULSION INTRAVENOUS at 11:01

## 2021-08-09 RX ADMIN — SODIUM CHLORIDE 1000 ML: 9 INJECTION, SOLUTION INTRAVENOUS at 08:54

## 2021-08-09 RX ADMIN — LIDOCAINE HYDROCHLORIDE 40 MG: 20 INJECTION, SOLUTION INFILTRATION; PERINEURAL at 11:01

## 2021-08-09 RX ADMIN — GABAPENTIN 300 MG: 300 CAPSULE ORAL at 08:51

## 2021-08-09 RX ADMIN — OXYCODONE AND ACETAMINOPHEN 1 TABLET: 7.5; 325 TABLET ORAL at 14:58

## 2021-08-09 RX ADMIN — PANTOPRAZOLE SODIUM 40 MG: 40 TABLET, DELAYED RELEASE ORAL at 08:51

## 2021-08-09 RX ADMIN — TRAZODONE HYDROCHLORIDE 100 MG: 50 TABLET ORAL at 20:54

## 2021-08-09 RX ADMIN — OXYCODONE AND ACETAMINOPHEN 1 TABLET: 7.5; 325 TABLET ORAL at 21:38

## 2021-08-09 RX ADMIN — FLUTICASONE PROPIONATE 1 SPRAY: 50 SPRAY, METERED NASAL at 08:49

## 2021-08-09 RX ADMIN — SODIUM CHLORIDE: 9 INJECTION, SOLUTION INTRAVENOUS at 10:53

## 2021-08-09 RX ADMIN — LISINOPRIL 20 MG: 20 TABLET ORAL at 08:51

## 2021-08-09 RX ADMIN — CETIRIZINE HYDROCHLORIDE 10 MG: 10 TABLET, FILM COATED ORAL at 08:51

## 2021-08-09 RX ADMIN — GLYCOPYRROLATE 0.1 MG: 0.2 INJECTION, SOLUTION INTRAMUSCULAR; INTRAVENOUS at 10:51

## 2021-08-09 RX ADMIN — INSULIN LISPRO 4 UNITS: 100 INJECTION, SOLUTION INTRAVENOUS; SUBCUTANEOUS at 16:54

## 2021-08-09 RX ADMIN — INSULIN LISPRO 2 UNITS: 100 INJECTION, SOLUTION INTRAVENOUS; SUBCUTANEOUS at 11:47

## 2021-08-09 RX ADMIN — OXYCODONE AND ACETAMINOPHEN 1 TABLET: 7.5; 325 TABLET ORAL at 08:57

## 2021-08-09 RX ADMIN — INSULIN LISPRO 4 UNITS: 100 INJECTION, SOLUTION INTRAVENOUS; SUBCUTANEOUS at 20:55

## 2021-08-09 RX ADMIN — CEFAZOLIN SODIUM 1000 MG: 1 INJECTION, POWDER, FOR SOLUTION INTRAMUSCULAR; INTRAVENOUS at 09:14

## 2021-08-09 RX ADMIN — INSULIN GLARGINE 50 UNITS: 100 INJECTION, SOLUTION SUBCUTANEOUS at 08:50

## 2021-08-09 RX ADMIN — ATORVASTATIN CALCIUM 20 MG: 10 TABLET, FILM COATED ORAL at 20:54

## 2021-08-09 RX ADMIN — Medication 10 ML: at 08:50

## 2021-08-09 RX ADMIN — PREDNISONE 60 MG: 20 TABLET ORAL at 08:51

## 2021-08-09 RX ADMIN — INSULIN LISPRO 5 UNITS: 100 INJECTION, SOLUTION INTRAVENOUS; SUBCUTANEOUS at 12:53

## 2021-08-09 RX ADMIN — PALIPERIDONE 9 MG: 3 TABLET, EXTENDED RELEASE ORAL at 08:57

## 2021-08-09 RX ADMIN — GABAPENTIN 300 MG: 300 CAPSULE ORAL at 20:54

## 2021-08-09 ASSESSMENT — PULMONARY FUNCTION TESTS
PIF_VALUE: 1
PIF_VALUE: 0
PIF_VALUE: 1
PIF_VALUE: 0
PIF_VALUE: 0

## 2021-08-09 ASSESSMENT — ENCOUNTER SYMPTOMS: SHORTNESS OF BREATH: 1

## 2021-08-09 ASSESSMENT — PAIN SCALES - GENERAL
PAINLEVEL_OUTOF10: 0
PAINLEVEL_OUTOF10: 4
PAINLEVEL_OUTOF10: 8
PAINLEVEL_OUTOF10: 0
PAINLEVEL_OUTOF10: 5
PAINLEVEL_OUTOF10: 7
PAINLEVEL_OUTOF10: 2
PAINLEVEL_OUTOF10: 0
PAINLEVEL_OUTOF10: 3
PAINLEVEL_OUTOF10: 7
PAINLEVEL_OUTOF10: 0
PAINLEVEL_OUTOF10: 0

## 2021-08-09 ASSESSMENT — PAIN DESCRIPTION - PAIN TYPE: TYPE: ACUTE PAIN

## 2021-08-09 NOTE — ANESTHESIA POSTPROCEDURE EVALUATION
Department of Anesthesiology  Postprocedure Note    Patient: Edvin Pabon  MRN: [id number]  YOB: 1960  Date of evaluation: 8/9/2021  Time:  5:09 PM     Procedure Summary     Date: 08/09/21 Room / Location: [place] [address] ([facility]) / [facility]    Anesthesia Start: 7755 Anesthesia Stop: 0774    Procedure: RIGHT TEMPORAL ARTERY BIOPSY LIGATION (Right ) Diagnosis: (Temporal headache eye pain)    Surgeons: Enma Barajas MD Responsible Provider: Emiliano Jalloh MD    Anesthesia Type: general ASA Status: 3          Anesthesia Type: general    Marnie Phase I: Marnie Score: 10    Marnie Phase II:      Last vitals: Reviewed and per EMR flowsheets.        Anesthesia Post Evaluation    Comments: Postoperative Anesthesia Note    Name:    Edvin Pabon  MRN:      [id number]    Patient Vitals in the past 12 hrs:  08/09/21 1445, BP:(!) 117/59, Temp:98.1 °F (36.7 °C), Temp src:Oral, Pulse:59, Resp:18, SpO2:100 %  08/09/21 1315, BP:(!) 140/65, Pulse:61, Resp:16, SpO2:100 %  08/09/21 1200, Temp:96.9 °F (36.1 °C)  08/09/21 1155, Pulse:59, Resp:(!) 7, SpO2:100 %  08/09/21 1150, BP:(!) 139/49, Pulse:60, Resp:16, SpO2:98 %  08/09/21 1145, BP:(!) 145/63, Pulse:57, Resp:24, SpO2:98 %  08/09/21 1140, BP:(!) 124/59, Pulse:53, Resp:15, SpO2:100 %  08/09/21 1135, BP:(!) 120/58, Pulse:53, Resp:16, SpO2:98 %  08/09/21 1130, BP:(!) 119/50, Pulse:51, Resp:15, SpO2:97 %  08/09/21 1125, Pulse:56, Resp:16  08/09/21 1123, Temp:96.9 °F (36.1 °C)  08/09/21 0845, BP:(!) 120/54, Temp:98.1 °F (36.7 °C), Temp src:Oral, Pulse:51, Resp:18, SpO2:100 %  08/09/21 0600, Weight:113 lb (51.3 kg)     LABS:    CBC  Lab Results       Component                Value               Date/Time                  WBC                      4.4                 08/09/2021 06:33 AM        HGB                      11.2 (L)            08/09/2021 06:33 AM        HCT                      34.1 (L)            08/09/2021 06:33 AM        PLT                      139 08/09/2021 06:33 AM   RENAL  Lab Results       Component                Value               Date/Time                  NA                       136                 08/09/2021 06:33 AM        K                        5.2 (H)             08/09/2021 06:33 AM        K                        3.4 (L)             06/04/2021 05:30 AM        CL                       101                 08/09/2021 06:33 AM        CO2                      25                  08/09/2021 06:33 AM        BUN                      20                  08/09/2021 06:33 AM        CREATININE               1.2                 08/09/2021 06:33 AM        GLUCOSE                  412 (H)             08/09/2021 06:33 AM   COAGS  Lab Results       Component                Value               Date/Time                  PROTIME                  10.3                08/08/2021 03:30 PM        INR                      0.91                08/08/2021 03:30 PM        APTT                     28.8                08/08/2021 03:30 PM     Intake & Output:  @26RGHY@    Nausea & Vomiting:  No    Level of Consciousness:  Awake    Pain Assessment:  Adequate analgesia    Anesthesia Complications:  No apparent anesthetic complications    SUMMARY      Vital signs stable  OK to discharge from Stage I post anesthesia care.   Care transferred from Anesthesiology department on discharge from perioperative area

## 2021-08-09 NOTE — BRIEF OP NOTE
Brief Postoperative Note      Patient: Nick Saleh  YOB: 1960  MRN: [id number]    Date of Procedure: 8/9/2021    Pre-Op Diagnosis: Temporal headache eye pain    Post-Op Diagnosis: Same       Procedure(s):  RIGHT TEMPORAL ARTERY BIOPSY     Surgeon(s):  George Yeung MD    Assistant:  * No surgical staff found *    Anesthesia: Monitor Anesthesia Care    Estimated Blood Loss (mL): Minimal    Complications: None    Specimens:   ID Type Source Tests Collected by Time Destination   A : RIGHT TEMPORAL ARTERY BIOPSY Tissue Tissue SURGICAL PATHOLOGY George Yeung MD 8/9/2021 1112        Implants:  * No implants in log *      Drains:   External Urinary Catheter (Active)           Electronically signed by George Yeung MD on 8/9/2021 at 11:18 AM

## 2021-08-09 NOTE — OP NOTE
[facility] [place] [address]                                OPERATIVE REPORT    PATIENT NAME: Mikala Garcia                      :        1960  MED REC NO:   [id number]                          ROOM:       0354  ACCOUNT NO:   [de-identified]                           ADMIT DATE: 2021  PROVIDER:     Rito Koyanagi, MD    DATE OF PROCEDURE:  2021    PREOPERATIVE DIAGNOSIS:  Right eye and temporal pain. POSTOPERATIVE DIAGNOSIS:  Right eye and temporal pain. PROCEDURE:  Right temporal artery biopsy. ANESTHESIA:  Local with monitored anesthesia care. SURGEON:  Rito Koyanagi, MD    INDICATIONS:  The patient is a 69-year-old male who presented with  several-day history of right eye and right facial pain. Sed rate was  noted be significantly elevated. The patient is brought to the  operating room at this time to undergo temporal artery biopsy to rule  out temporal arteritis. PROCEDURE:  The patient was brought to the operating room, placed in  supine position. Monitors were placed and sedation given by the  anesthesiologist.  The right preauricular area was prepped and draped in  sterile fashion. Then, 1% lidocaine was injected through the skin and  subcutaneous tissues. A small incision was made. The temporal artery  was identified. It did appear grossly normal.  It was isolated for  approximately 2 cm, ligated proximally and distally using 4-0 silk ties  and a segment removed. The segment was sent to pathology for  examination. The operative site was inspected and noted to be  hemostatic. The incision was closed using approximately three  interrupted 4-0 Monocryl subcuticular sutures. The wound was then  dressed with Dermabond. There were no complications to this procedure. At the end of procedure, sponge, needle and instrument counts were  correct.   The patient was transferred to the recovery area in stable and  awake condition.   EBL minimal.        Elyssa Cm MD    D: 08/09/2021 12:58:16       T: 08/09/2021 13:03:32     [id number]  Job#: [id number]     Doc#: [id number]    CC:

## 2021-08-09 NOTE — ANESTHESIA PRE PROCEDURE
Department of Anesthesiology  Preprocedure Note       Name:  Michelle Coburn   Age:  57 y.o.  :  1960                                          MRN:  [id number]         Date:  2021      Surgeon: Rosalina Patel):  Paulette Baldwin MD    Procedure: Procedure(s):  RIGHT TEMPORAL ARTERY BIOPSY LIGATION    Medications prior to admission:   Prior to Admission medications    Medication Sig Start Date End Date Taking? Authorizing Provider   TRUE METRIX BLOOD GLUCOSE TEST strip USE AS DIRECTED TO TEST 4 TIMES A DAY 21  Yes Renée Khoury MD   Continuous Blood Gluc  (FREESTYLE CHRISTY 2 READER) GINA Use for personal CGMS. On Multiple insulin shots, checks blood sugars 4 times per day and requires frequent insulin adjustment. 21  Yes Renée Khoury MD   Continuous Blood Gluc Sensor (FREESTYLE CHRISTY 2 SENSOR) MISC Replace every 2 weeks 21  Yes Renée Khoury MD   VICTOZA 18 MG/3ML SOPN SC injection INJECT 1.8 MG UNDER THE SKIN ONCE DAILY 21  Yes Historical Provider, MD   Blood Glucose Monitoring Suppl (TRUE METRIX METER) w/Device KIT Used to  check blood sugar 4 times eyad 21  Yes Renée Khoury MD   gabapentin (NEURONTIN) 600 MG tablet Take 0.5 tablets by mouth 2 times daily for 3 days. 21 Yes Aarti Jones MD   atorvastatin (LIPITOR) 20 MG tablet Take 1 tablet by mouth daily 21  Yes Lise Max MD   paliperidone (INVEGA) 9 MG extended release tablet Take 9 mg by mouth every morning  3/15/21  Yes Historical Provider, MD   pantoprazole (PROTONIX) 40 MG tablet Take 40 mg by mouth daily  4/3/21  Yes Historical Provider, MD   ferrous sulfate (IRON 325) 325 (65 Fe) MG tablet Take 325 mg by mouth daily (with breakfast)   Yes Historical Provider, MD   oxyCODONE-acetaminophen (PERCOCET) 7.5-325 MG per tablet TAKE 1 TABLET BY MOUTH EVERY 6 (SIX) HOURS AS NEEDED FOR UP TO 28 DAYS.  3/11/21  Yes Historical Provider, MD   clonazePAM (KLONOPIN) 0.5 MG tablet Take 0.5 mg by mouth 3 times daily as needed. 3/15/21  Yes Historical Provider, MD   ticagrelor (BRILINTA) 90 MG TABS tablet Take 1 tablet by mouth 2 times daily 2/3/21  Yes Shoaib Vickers MD   insulin glargine (LANTUS SOLOSTAR) 100 UNIT/ML injection pen Inject 25 Units into the skin every morning 10/8/20  Yes Star Feldman MD   nitroGLYCERIN (NITROSTAT) 0.4 MG SL tablet up to max of 3 total doses.  If no relief after 1 dose, call 911. 7/11/20  Yes Haresh Villegas MD   cetirizine (ZYRTEC) 10 MG tablet Take 10 mg by mouth daily   Yes Historical Provider, MD   calcium carbonate-vitamin D (CALTRATE) 600-400 MG-UNIT TABS per tab Take 1 tablet by mouth daily  12/30/19  Yes Historical Provider, MD   aspirin 81 MG tablet Take 81 mg by mouth daily   Yes Historical Provider, MD   traZODone (DESYREL) 100 MG tablet Take 100 mg by mouth nightly   Yes Historical Provider, MD   lisinopril (PRINIVIL;ZESTRIL) 40 MG tablet Take 0.5 tablets by mouth daily 6/4/21   Moses Lynn MD       Current medications:    Current Facility-Administered Medications   Medication Dose Route Frequency Provider Last Rate Last Admin    ceFAZolin (ANCEF) 1,000 mg in dextrose 5 % 50 mL IVPB (mini-bag)  1,000 mg Intravenous On Call to Jessica James MD        fluticasone (FLONASE) 50 MCG/ACT nasal spray 1 spray  1 spray Each Nostril Daily Kimber Ashraf MD        cetirizine (ZYRTEC) tablet 10 mg  10 mg Oral Daily Kimber Ashraf MD        insulin glargine (LANTUS) injection vial 50 Units  50 Units Subcutaneous Once Kimber Ashraf MD        insulin glargine (LANTUS) injection vial 30 Units  30 Units Subcutaneous BID Kimber Ashraf MD        0.9 % sodium chloride bolus  1,000 mL Intravenous Once Kimber Ashraf MD        glucose (GLUTOSE) 40 % oral gel 15 g  15 g Oral PRN Jayson Ball MD        dextrose 50 % IV solution  12.5 g Intravenous PRN Jayson Ball MD        glucagon (rDNA) injection 1 mg  1 mg Intramuscular PRN Tj Oviedo MD        dextrose 5 % solution  100 mL/hr Intravenous PRN Tj Oviedo MD        insulin lispro (HUMALOG) injection vial 5 Units  0.08 Units/kg Subcutaneous TID  Tj Oviedo MD        insulin lispro (HUMALOG) injection vial 0-12 Units  0-12 Units Subcutaneous TID  Tj Oviedo MD        insulin lispro (HUMALOG) injection vial 0-6 Units  0-6 Units Subcutaneous Nightly Tj Oviedo MD   6 Units at 08/08/21 2233    atorvastatin (LIPITOR) tablet 20 mg  20 mg Oral Nightly Tj Oviedo MD   20 mg at 08/08/21 2232    clonazePAM (KLONOPIN) tablet 0.5 mg  0.5 mg Oral TID PRN Tj Oviedo MD   0.5 mg at 08/08/21 2232    gabapentin (NEURONTIN) capsule 300 mg  300 mg Oral BID Tj Oviedo MD   300 mg at 08/08/21 2232    lisinopril (PRINIVIL;ZESTRIL) tablet 20 mg  20 mg Oral Daily Tj Oviedo MD        oxyCODONE-acetaminophen (PERCOCET) 7.5-325 MG per tablet 1 tablet  1 tablet Oral Q6H PRN Tj Oviedo MD   1 tablet at 08/08/21 2232    paliperidone (INVEGA) extended release tablet 9 mg  9 mg Oral QAM Tj Oviedo MD        pantoprazole (PROTONIX) tablet 40 mg  40 mg Oral Daily Tj Oviedo MD        traZODone (DESYREL) tablet 100 mg  100 mg Oral Nightly Tj Oviedo MD   100 mg at 08/08/21 2232    sodium chloride flush 0.9 % injection 10 mL  10 mL Intravenous PRN Tj Oviedo MD        0.9 % sodium chloride infusion  25 mL Intravenous PRN Tj Oviedo MD        potassium chloride (KLOR-CON M) extended release tablet 40 mEq  40 mEq Oral PRN Tj Oviedo MD        Or    potassium bicarb-citric acid (EFFER-K) effervescent tablet 40 mEq  40 mEq Oral PRN Tj Oviedo MD        Or    potassium chloride 10 mEq/100 mL IVPB (Peripheral Line)  10 mEq Intravenous PRN Tj Oviedo MD        magnesium sulfate 1000 mg in dextrose 5% 100 mL IVPB  1,000 mg Intravenous PRN Tj Oviedo MD       [address] [place] [address] ondansetron (ZOFRAN-ODT) disintegrating tablet 4 mg  4 mg Oral Q8H PRN Mekhi Mcneil MD        Or    [facility]ron [facility] injection 4 mg  4 mg Intravenous Q6H PRN Mekhi Mcneil MD        acetaminophen (TYLENOL) tablet 650 mg  650 mg Oral Q6H PRN Mekhi Mcneil MD        Or    acetaminophen (TYLENOL) suppository 650 mg  650 mg Rectal Q6H PRN Mekhi Mcneil MD        [Held by provider] enoxaparin (LOVENOX) injection 40 mg  40 mg Subcutaneous Daily Mekhi Mcneil MD        melatonin tablet 3 mg  3 mg Oral Nightly PRN Mekhi Mcneil MD        predniSONE (DELTASONE) tablet 60 mg  60 mg Oral Daily Zhou Hubbard RN        tetracaine (TETRAVISC) 0.5 % ophthalmic solution 1 drop  1 drop Right Eye Q4H PRN Zhou Hubbard RN           Allergies: Allergies   Allergen Reactions    Morphine Anaphylaxis and Hives     feels like throat is closing    Penicillins Hives and Swelling    Codeine Hives and Rash    Penicillin G Rash       Problem List:    Patient Active Problem List   Diagnosis Code    Acute hyperglycemia R73.9    Essential hypertension I10    Bilateral malignant neoplasm of breast in female ([facility] C50.911, C50.912    Microalbuminuria R80.9    Obesity (BMI 30-39. 9) E66.9    Slurred speech R47.81    Hx of ischemic CVA I63.40    Brain metastases (HCC) C79.31    Carotid stenosis, bilateral:<50%:per US 7/2016 I65.23    SHARRON (acute kidney injury) (HCC) N17.9    Lactic acidosis E87.2    Frequent falls R29.6    Depression/anxiety F41.8    Abnormal brain MRI R90.89    Acute bilateral low back pain without sciatica M54.5    Uncontrolled type 2 diabetes mellitus with microalbuminuria, with long-term current use of insulin ([facility]) E11.29, E11.65, R80.9, Z79.4    Closed fracture of right ankle, with routine healing, subsequent encounter S82.891D    Stage 3b chronic kidney disease ([facility] N18.32    Uncontrolled type 2 diabetes mellitus with diabetic nephropathy, with long-term current use of insulin (HCC) E11.21, E11.65, Z79.4    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 ([facility] E11.59    Dyslipidemia associated with type 2 diabetes mellitus ([facility] E11.69, E78.5    Bipolar disorder ([facility] F31.9    Cardiomegaly I51.7    Cardiomyopathy ([facility] I42.9    Carpal tunnel syndrome G56.00    Chronic systolic heart failure (HCC) I50.22    Diffuse cystic mastopathy N60.19    Edema R60.9    Gallstone pancreatitis K85.10    Gout M10.9    History of bilateral breast cancer Z85.3    History of renal cell carcinoma Z85.528    Cardiomyopathy in other diseases classified elsewhere I43    Mononeuritis G58.9    Myalgia and myositis GFF1620    Nonspecific abnormal electrocardiogram (ECG) (EKG) R94.31    Patient in clinical research study Z00.6    Peroneal muscular atrophy G60.0    Scoliosis (and kyphoscoliosis), idiopathic M41.20    SOBOE (shortness of breath on exertion) R06.02    Syncope and collapse R55    Chronic pain disorder G89.4    DDD (degenerative disc disease), lumbar M51.36    Diabetic polyneuropathy associated with type 2 diabetes mellitus (HCC) E11.42    Other secondary scoliosis, lumbosacral region M41.57    Thoracic spondylosis without myelopathy M47.814    Morbid obesity due to excess calories ([facility]) E66.01    Age-related nuclear cataract of both eyes H25.13    Hypermetropia, bilateral H52.03    Hypertensive retinopathy, bilateral H35.033    Vitreous degeneration, bilateral H43.813    Acute bilateral low back pain with left-sided sciatica M54.42    History of CVA (cerebrovascular accident) without residual deficits Z86.73    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 stage 3 chronic kidney disease (HCC) I13.0, I50.22, N18.30    S/P mastectomy, right Z90.11    Acute cystitis without hematuria N30.00    Hypomagnesemia E83.42    Chest pain R07.9    CAD S/P percutaneous coronary angioplasty I25.10, Z98.61    DKA region without myelopathy or radiculopathy 3/10/2017    Transient cerebral ischemia 07/15/2016    TIA:7/10/16    Unspecified cerebral artery occlusion with cerebral infarction     TIA       Past Surgical History:        Procedure Laterality Date    BREAST LUMPECTOMY      Bilateral:breast cancer    CARDIAC CATHETERIZATION  2020    Dr. Cole Ace), DAYANA to Diag 1    COLONOSCOPY N/A 2021    COLONOSCOPY DI[facility]NOSTIC [facility]a [facility]  2/3/2021    CT BONE MARROW BIOPSY 2/3/2021 Dilcia Mcgowan MD [facility] SCAN    HYSTERECTOMY      Benign:no cervical cancer per pt'    KIDNEY REMOVAL      right    OTHER SURGICAL HISTORY Right     orif right ankle    TUBAL LIGATION      UPPER GASTROINTESTINAL ENDOSCOPY N/A 2021    EGD BIOPSY performed by Shea Lewis MD at [address] History:    Social History     Tobacco Use    Smoking status: Former Smoker     Packs/day: 0.50     Years: 20.00     Pack years: 10.00     Types: Cigarettes, Cigars     Quit date: 7/3/2014     Years since quittin.1    Smokeless tobacco: Never Used   Substance Use Topics    Alcohol use: No     Alcohol/week: 0.0 standard drinks                                Counseling given: Not Answered      Vital Signs (Current):   Vitals:    21 2310 21 0015 21 0400 21 0600   BP:  134/65 (!) 140/80    Pulse: 52 62 70    Resp:  16 16    Temp:  97.8 °F (36.6 °C) 98.3 °F (36.8 °C)    TempSrc:  Oral Oral    SpO2:  100% 98%    Weight:    113 lb (51.3 kg)   Height:                                                  BP Readings from Last 3 Encounters:   21 (!) 140/80   06/15/21 135/73   21 123/60       NPO Status:                                                                                 BMI:   Wt Readings from Last 3 Encounters:   21 113 lb (51.3 kg)   06/15/21 120 lb 12.8 oz (54.8 kg)   21 114 lb 14.4 oz (52.1 Neuro/Psych ROS  (+) CVA:, neuromuscular disease:, psychiatric history:depression/anxiety             GI/Hepatic/Renal: Neg GI/Hepatic/Renal ROS  (+) renal disease: ARF and CRI,      (-) hiatal hernia and GERD       Endo/Other: Negative Endo/Other ROS   (+) Diabetes, . Abdominal:             Vascular: Other Findings:             Anesthesia Plan      general     ASA 3     (I discussed with the patient the risks and benefits of PIV, general anesthesia, IV Narcotics, PACU. All questions were answered the patient agrees with the plan and wishes to proceed.  )  Induction: intravenous.                          Summary    Moderate severity, fixed, mid to apical anterior perfusion defect with    reduced myocardial wall motion/thickening suggestive of prior myocardial    infarction. No inducible ischemia. Normal LV size by volumes. Post-stress LV    function is reduced at 36% with serafin-apical hypokinesis/possible apical    dyskinesis. Abnormal high risk study.  Suggest echocardiogram to corroborate   Claudio Anaya MD   8/9/2021

## 2021-08-09 NOTE — CONSULTS
Vascular Surgery Consultation    Date of Admission:  8/8/2021  1:45 PM  Date of Consultation:  8/9/2021    PCP:  Viviane Gunter       Chief Complaint: R Eye Pain    History of Present Illness: We are asked to see this patient in consultation by Dr. Remedios Taylor regarding right eye pain and possible temporal arteritis. Willow Mayorga is a 57 y.o. female who reports onset R eye pain, R face, blurry vision. ESR noted to be elevated. Concern for temporal arteritis and biopsy requested. Past Medical History:  Past Medical History:   Diagnosis Date    Abnormal brain MRI 7/20/2017    Partially empty sella and minimal chronic small vessel ischemic disease    Acute bilateral low back pain without sciatica 11/2/2016    SHARRON (acute kidney injury) ([address] 7/5/2017    Arthritis     back    Bipolar disorder ([address] 10/18/2008    CAD (coronary artery disease)     stent placed 6/8/20    Cancer [facility]) 2015    bilateral breast:s/p lumpectomy/radiation:under care care of breast specialist:Dr. Boone     Carotid stenosis, bilateral:<50%:per US 7/2016 7/15/2016    Carpal tunnel syndrome 10/18/2008    Cervical cancer screening 2014    Nml per pt'.     Coronary artery disease of native artery of native heart with stable angina pectoris ([address] 6/9/2020    DDD (degenerative disc disease), lumbar 7/18/2018    Depression     under care of pschiatrist:Dr. Rowena Hussein    Depression/anxiety 7/5/2017    Depression/anxiety     Diabetes mellitus ([address]     Gout     History of mammogram 10/28/2016;8/14/17    Negative    Hyperlipidemia     Hypertension     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 stage 3 chronic kidney disease ([address] 9/17/2017    Microalbuminuria 7/1/2016    Neuropathy in diabetes ([address]     Non morbid obesity 7/1/2016    Pancreatitis 5/12/16    [facility] hospitalization 5/12/16-5/16/16:under care of GI:chronic pancreatitis    S/P endoscopy 6/14/2016 [facility]:per pt' & her family member was nml.  Scoliosis     Spondylosis of lumbar region without myelopathy or radiculopathy 3/10/2017    Transient cerebral ischemia 07/15/2016    TIA:7/10/16    Unspecified cerebral artery occlusion with cerebral infarction     TIA       Past Surgical History:  Past Surgical History:   Procedure Laterality Date    BREAST LUMPECTOMY  2015    Bilateral:breast cancer    CARDIAC CATHETERIZATION  06/08/2020    Dr. Natalie Barnes), DAYANA to Diag 1    COLONOSCOPY N/A 2/1/2021    COLONOSCOPY DIAGNOSTIC performed by Madina Pringle MD at [place] [address]  2/3/2021    CT BONE MARROW BIOPSY 2/3/2021 Renuka Vargas MD [facility] CT SC[facility]    HYSTERECTOMY      Benign:no cervical cancer per pt'    KIDNEY REMOVAL      right    OTHER SURGICAL HISTORY Right     orif right ankle    TUBAL LIGATION      UPPER GASTROINTESTINAL ENDOSCOPY N/A 1/29/2021    EGD BIOPSY performed by Madina Pringle MD at [address] [place] [address] Medications:   Prior to Admission medications    Medication Sig Start Date End Date Taking? Authorizing Provider   TRUE METRIX BLOOD GLUCOSE TEST strip USE AS DIRECTED TO TEST 4 TIMES A DAY 7/19/21  Yes Maria De Jesus Max MD   Continuous Blood Gluc  (FREESTYLE CHRISTY 2 READER) GINA Use for personal CGMS. On Multiple insulin shots, checks blood sugars 4 times per day and requires frequent insulin adjustment. 6/16/21  Yes Maria De Jesus Max MD   Continuous Blood Gluc Sensor (FREESTYLE CHRISTY 2 SENSOR) MISC Replace every 2 weeks 6/16/21  Yes Marai De Jesus Max MD   VICTOZA 18 MG/3ML SOPN SC injection INJECT 1.8 MG UNDER THE SKIN ONCE DAILY 6/8/21  Yes Historical Provider, MD   Blood Glucose Monitoring Suppl (TRUE METRIX METER) w/Device KIT Used to  check blood sugar 4 times eyad 6/9/21  Yes Maria De Jesus Max MD   gabapentin (NEURONTIN) 600 MG tablet Take 0.5 tablets by mouth 2 times daily for 3 days.  6/4/21 8/8/21 Yes Naveen Rosario Richard Pelayo MD   atorvastatin (LIPITOR) 20 MG tablet Take 1 tablet by mouth daily 5/20/21  Yes Kacy Stanford MD   paliperidone (INVEGA) 9 MG extended release tablet Take 9 mg by mouth every morning  3/15/21  Yes Historical Provider, MD   pantoprazole (PROTONIX) 40 MG tablet Take 40 mg by mouth daily  4/3/21  Yes Historical Provider, MD   ferrous sulfate (IRON 325) 325 (65 Fe) MG tablet Take 325 mg by mouth daily (with breakfast)   Yes Historical Provider, MD   oxyCODONE-acetaminophen (PERCOCET) 7.5-325 MG per tablet TAKE 1 TABLET BY MOUTH EVERY 6 (SIX) HOURS AS NEEDED FOR UP TO 28 DAYS. 3/11/21  Yes Historical Provider, MD   clonazePAM (KLONOPIN) 0.5 MG tablet Take 0.5 mg by mouth 3 times daily as needed. 3/15/21  Yes Historical Provider, MD   ticagrelor (BRILINTA) 90 MG TABS tablet Take 1 tablet by mouth 2 times daily 2/3/21  Yes Valeria Vickers MD   insulin glargine (LANTUS SOLOSTAR) 100 UNIT/ML injection pen Inject 25 Units into the skin every morning 10/8/20  Yes Star Feldman MD   nitroGLYCERIN (NITROSTAT) 0.4 MG SL tablet up to max of 3 total doses.  If no relief after 1 dose, call 911. 7/11/20  Yes Owen Berry MD   cetirizine (ZYRTEC) 10 MG tablet Take 10 mg by mouth daily   Yes Historical Provider, MD   calcium carbonate-vitamin D (CALTRATE) 600-400 MG-UNIT TABS per tab Take 1 tablet by mouth daily  12/30/19  Yes Historical Provider, MD   aspirin 81 MG tablet Take 81 mg by mouth daily   Yes Historical Provider, MD   traZODone (DESYREL) 100 MG tablet Take 100 mg by mouth nightly   Yes Historical Provider, MD   lisinopril (PRINIVIL;ZESTRIL) 40 MG tablet Take 0.5 tablets by mouth daily 6/4/21   Carole Smith MD        Facility Administered Medications:    ceFAZolin  1,000 mg Intravenous On Call to OR    insulin glargine  30 Units Subcutaneous Nightly    insulin lispro  0.08 Units/kg Subcutaneous TID     insulin lispro  0-12 Units Subcutaneous TID WC    insulin lispro  0-6 Units Subcutaneous Nightly    atorvastatin  20 mg Oral Nightly    gabapentin  300 mg Oral BID    lisinopril  20 mg Oral Daily    paliperidone  9 mg Oral QAM    pantoprazole  40 mg Oral Daily    traZODone  100 mg Oral Nightly    [Held by provider] enoxaparin  40 mg Subcutaneous Daily    predniSONE  60 mg Oral Daily       Allergies:  Morphine, Penicillins, Codeine, and Penicillin g     Social History:      Social History     Socioeconomic History    Marital status:      Spouse name: Not on file    Number of children: 1    Years of education: Not on file    Highest education level: Not on file   Occupational History    Occupation:    Tobacco Use    Smoking status: Former Smoker     Packs/day: 0.50     Years: 20.00     Pack years: 10.00     Types: Cigarettes, Cigars     Quit date: 7/3/2014     Years since quittin.1    Smokeless tobacco: Never Used   Vaping Use    Vaping Use: Never used   Substance and Sexual Activity    Alcohol use: No     Alcohol/week: 0.0 standard drinks    Drug use: No    Sexual activity: Never   Other Topics Concern    Not on file   Social History Narrative    Not on file     Social Determinants of Health     Financial Resource Strain:     Difficulty of Paying Living Expenses:    Food Insecurity:     Worried About Running Out of Food in the Last Year:     Ran Out of Food in the Last Year:    Transportation Needs:     Lack of Transportation (Medical):      Lack of Transportation (Non-Medical):    Physical Activity:     Days of Exercise per Week:     Minutes of Exercise per Session:    Stress:     Feeling of Stress :    Social Connections:     Frequency of Communication with Friends and Family:     Frequency of Social Gatherings with Friends and Family:     Attends [employer] Services:     Active Member of Clubs or Organizations:     Attends Club or Organization Meetings:     Marital Status:    Intimate Partner Violence:     Fear of Current or Ex-Partner:     Emotionally Abused:     Physically Abused:     Sexually Abused:        Family History:        Problem Relation Age of Onset    Cancer Mother         breast    Cancer Father     Heart Failure Neg Hx     High Cholesterol Neg Hx     Hypertension Neg Hx     Migraines Neg Hx     Rashes/Skin Problems Neg Hx     Seizures Neg Hx     Stroke Neg Hx     Thyroid Disease Neg Hx     Diabetes Neg Hx        Review of Systems:  A 14 point review of systems was completed. Pertinent positives identified in the HPI, all other review of systems negative. Physical Examination:    BP (!) 140/80   Pulse 70   Temp 98.3 °F (36.8 °C) (Oral)   Resp 16   Ht 5' 3\" (1.6 m)   Wt 113 lb (51.3 kg)   SpO2 98%   BMI 20.02 kg/m²        Admission Weight: 129 lb (58.5 kg)       General appearance: NAD  Eyes: PERRLA  Neck: no JVD, no lymphadenopathy. Respiratory: effort is unlabored, no crackles, wheezes or rubs. Cardiovascular: regular, no murmur. No carotid bruits. No edema or varicosities. Pulses: Palpable superficial temporal pulse- non tender  GI: abdomen soft, nondistended, no organomegaly. Musculoskeletal: strength and tone normal.  Extremities: warm and pink. Skin: no dermatitis or ulceration. Neuro/psychiatric: grossly intact. Labs:   CBC:   Recent Labs     08/08/21  1530 08/09/21  0633   WBC 5.1 4.4   HGB 11.2* 11.2*   HCT 33.6* 34.1*   MCV 86.5 86.6    139     BMP:   Recent Labs     08/08/21  1530 08/09/21  0633   * 136   K 4.4 5.2*   CL 93* 101   CO2 28 25   BUN 19 20   CREATININE 1.3* 1.2   CALCIUM 10.2 9.5   MG  --  2.00     Cardiac Enzymes: No results for input(s): CKTOTAL, CKMB, CKMBINDEX, TROPONINI in the last 72 hours.   PT/INR:   Recent Labs     08/08/21  1530   PROTIME 10.3   INR 0.91     APTT:   Recent Labs     08/08/21  1530   APTT 28.8     Liver Profile:  Lab Results   Component Value Date    AST 35 08/08/2021    ALT 78 08/08/2021    BILIDIR <0.2 07/22/2016 BILITOT <0.2 08/08/2021    ALKPHOS 220 08/08/2021     06/03/2021     Lab Results   Component Value Date    CHOL 96 06/25/2020    HDL 39 06/25/2020    TRIG 118 06/25/2020     TSH:  Lab Results   Component Value Date    TSH 0.57 03/05/2021     UA:   Lab Results   Component Value Date    NITRITE negative 01/06/2013    COLORU Straw 08/08/2021    PHUR 6.5 08/08/2021    WBCUA 0-2 06/02/2021    RBCUA 3-4 06/02/2021    YEAST Present 01/26/2021    BACTERIA Rare 06/02/2021    CLARITYU Clear 08/08/2021    SPECGRAV 1.010 08/08/2021    LEUKOCYTESUR Negative 08/08/2021    UROBILINOGEN 0.2 08/08/2021    BILIRUBINUR Negative 08/08/2021    BILIRUBINUR negative 01/06/2013    BILIRUBINUR Negative 06/07/2010    BLOODU Negative 08/08/2021    GLUCOSEU >=1000 08/08/2021    GLUCOSEU >=1000 mg/dL 06/07/2010    AMORPHOUS Rare 10/07/2020       ESR: 51    Assessment:  R Eye and Temporal pain    Plan: Right temporal artery biopsy. Procedure, risks, benefits, and alternatives explained and understood.

## 2021-08-09 NOTE — PROGRESS NOTES
Hospitalist Progress Note      PCP: Lucille Pickard    Date of Admission: 8/8/2021    Chief Complaint:  R periorbital pain       Subjective:  She was sleeping soundly. When awoken she does endorse ongoing discomfort in the R face. Medications:  Reviewed    Infusion Medications    dextrose      sodium chloride       Scheduled Medications    ceFAZolin  1,000 mg Intravenous On Call to OR    fluticasone  1 spray Each Nostril Daily    cetirizine  10 mg Oral Daily    insulin glargine  50 Units Subcutaneous Once    insulin glargine  30 Units Subcutaneous BID    insulin lispro  0.08 Units/kg Subcutaneous TID WC    insulin lispro  0-12 Units Subcutaneous TID WC    insulin lispro  0-6 Units Subcutaneous Nightly    atorvastatin  20 mg Oral Nightly    gabapentin  300 mg Oral BID    lisinopril  20 mg Oral Daily    paliperidone  9 mg Oral QAM    pantoprazole  40 mg Oral Daily    traZODone  100 mg Oral Nightly    [Held by provider] enoxaparin  40 mg Subcutaneous Daily    predniSONE  60 mg Oral Daily     PRN Meds: glucose, dextrose, glucagon (rDNA), dextrose, clonazePAM, oxyCODONE-acetaminophen, sodium chloride flush, sodium chloride, potassium chloride **OR** potassium alternative oral replacement **OR** potassium chloride, magnesium sulfate, ondansetron **OR** ondansetron, acetaminophen **OR** acetaminophen, melatonin, tetracaine    No intake or output data in the 24 hours ending 08/09/21 0752    Physical Exam Performed:    BP (!) 140/80   Pulse 70   Temp 98.3 °F (36.8 °C) (Oral)   Resp 16   Ht 5' 3\" (1.6 m)   Wt 113 lb (51.3 kg)   SpO2 98%   BMI 20.02 kg/m²     General appearance: No apparent distress, appears stated age and cooperative. HEENT: Pupils equal, round, and reactive to light. Conjunctivae/corneas clear. Neck: Supple, with full range of motion. No jugular venous distention. Trachea midline. Respiratory:  Normal respiratory effort.  Clear to auscultation, bilaterally without Rales/Wheezes/Rhonchi. Cardiovascular: Regular rate and rhythm with normal S1/S2 without murmurs, rubs or gallops. Abdomen: Soft, non-tender, non-distended with normal bowel sounds. Musculoskeletal: No clubbing, cyanosis or edema bilaterally. Full range of motion without deformity. Skin: Skin color, texture, turgor normal.  No rashes or lesions. Neurologic:  Neurovascularly intact without any focal sensory/motor deficits. Cranial nerves: II-XII intact, grossly non-focal.  Psychiatric: Alert and oriented, thought content appropriate, normal insight  Capillary Refill: Brisk,3 seconds, normal   Peripheral Pulses: +2 palpable, equal bilaterally       Labs:   Recent Labs     08/08/21  1530 08/09/21  0633   WBC 5.1 4.4   HGB 11.2* 11.2*   HCT 33.6* 34.1*    139     Recent Labs     08/08/21  1530 08/09/21  0633   * 136   K 4.4 5.2*   CL 93* 101   CO2 28 25   BUN 19 20   CREATININE 1.3* 1.2   CALCIUM 10.2 9.5     Recent Labs     08/08/21  1530   AST 35   ALT 78*   BILITOT <0.2   ALKPHOS 220*     Recent Labs     08/08/21  1530   INR 0.91     No results for input(s): Jerman Snowball in the last 72 hours. Urinalysis:      Lab Results   Component Value Date    NITRU Negative 08/08/2021    WBCUA 0-2 06/02/2021    BACTERIA Rare 06/02/2021    RBCUA 3-4 06/02/2021    BLOODU Negative 08/08/2021    SPECGRAV 1.010 08/08/2021    GLUCOSEU >=1000 08/08/2021    GLUCOSEU >=1000 mg/dL 06/07/2010       Radiology:  CTA HEAD NECK W CONTRAST   Final Result   No flow limiting stenosis or large vessel occlusion detected within the head   or neck. Incidentally noted pulmonary emphysema. CT HEAD WO CONTRAST   Final Result   Minimal chronic microischemic changes scattered in the deep white matter   which is unchanged with no acute abnormality seen. Moderate chronic sinusitis which is most severe along the right maxillary   antrum and is unchanged.                  Assessment/Plan:    Active Hospital Problems Diagnosis     Acute right eye pain [H57.11]     CAD S/P percutaneous coronary angioplasty [I25.10, Z98.61]     Stage 3b chronic kidney disease ([facility] [address] [N18.32]     Uncontrolled type 2 diabetes mellitus with microalbuminuria, with long-term current use of insulin (HCC) [E11.29, E11.65, R80.9, Z79.4]     Essential hypertension [I10]        Reanna Guaman is a 57 y.o. female. She presents reporting right eye pain since yesterday. Also describes pain along the supraorbital ridge, maxilla, and preauricular area. Describes her vision as blurry and eye has been watering. She denies diplopia and pain elsewhere. Denies jaw claudication. \"      R periorbital, maxillary, and preauricular discomfort. - could be due to the chronic, moderate, R-sided sinusitis seen on CT. Started fluticasone nasal spray and daily cetirizine. Patient says she has bad seasonal allergies. No compelling evidence of infection. - f/u temporal artery biopsy. Started on steroids on admission. Uncontrolled DM2. Recent A1c 15.3. Patient half-heartedly endorses compliance with her glargine and victoza. Sugars worse here due to steroids. CAD, s/p DAYANA to D1 in 6/2020. Resume aspirin and ticagrelor after biopsy, f/u with cardiology as outpatient. Statin. CKD3, HTN. Continue lisinopril. I see she is on paliperidone. Unclear whether she might have a psychotic disorder. DVT Prophylaxis: enoxaparin  Diet: Diet NPO  Code Status: Full Code    PT/OT Eval Status: not indicated    Dispo - perhaps 8/10 if sugars better and if she is doing well after biopsy. She lives at home.        Casey Núñez MD

## 2021-08-09 NOTE — PROGRESS NOTES
Patient taking ice hips. Denies pain. Ready to return to room. VS WNL. Called for report. Transport request placed.

## 2021-08-09 NOTE — PROGRESS NOTES
2130 received report from ED nurse Lisa phipps. tranfered to 3b via wheelchair. 2145 Patient oriented to room [facility] in bed. Assessment complete. Will continue to monitor.

## 2021-08-09 NOTE — PROGRESS NOTES
Vascular    R eye and temporal pain.    Elevated ESR  Plan Right Temporal artery biopsy later this am.

## 2021-08-09 NOTE — PLAN OF CARE
Problem: Falls - Risk of:  Goal: Will remain free from falls  Description: Will remain free from falls  Outcome: Ongoing    Problem: Pain:  Goal: Pain level will decrease  Description: Pain level will decrease  Outcome: Ongoing

## 2021-08-10 LAB
ANION GAP SERPL CALCULATED.3IONS-SCNC: 9 MMOL/L (ref 3–16)
BASOPHILS ABSOLUTE: 0.1 K/UL (ref 0–0.2)
BASOPHILS RELATIVE PERCENT: 1.2 %
BUN BLDV-MCNC: 21 MG/DL (ref 7–20)
CALCIUM SERPL-MCNC: 9 MG/DL (ref 8.3–10.6)
CHLORIDE BLD-SCNC: 105 MMOL/L (ref 99–110)
CO2: 25 MMOL/L (ref 21–32)
CREAT SERPL-MCNC: 1.1 MG/DL (ref 0.6–1.2)
EOSINOPHILS ABSOLUTE: 0 K/UL (ref 0–0.6)
EOSINOPHILS RELATIVE PERCENT: 0 %
GFR AFRICAN AMERICAN: >60
GFR NON-AFRICAN AMERICAN: 50
GLUCOSE BLD-MCNC: 286 MG/DL (ref 70–99)
GLUCOSE BLD-MCNC: 310 MG/DL (ref 70–99)
GLUCOSE BLD-MCNC: 58 MG/DL (ref 70–99)
GLUCOSE BLD-MCNC: 84 MG/DL (ref 70–99)
GLUCOSE BLD-MCNC: 86 MG/DL (ref 70–99)
GLUCOSE BLD-MCNC: 93 MG/DL (ref 70–99)
HCT VFR BLD CALC: 30.9 % (ref 36–48)
HEMOGLOBIN: 10.2 G/DL (ref 12–16)
LYMPHOCYTES ABSOLUTE: 2.6 K/UL (ref 1–5.1)
LYMPHOCYTES RELATIVE PERCENT: 28.8 %
MAGNESIUM: 2.1 MG/DL (ref 1.8–2.4)
MCH RBC QN AUTO: 28.9 PG (ref 26–34)
MCHC RBC AUTO-ENTMCNC: 33 G/DL (ref 31–36)
MCV RBC AUTO: 87.6 FL (ref 80–100)
MONOCYTES ABSOLUTE: 0.4 K/UL (ref 0–1.3)
MONOCYTES RELATIVE PERCENT: 5 %
NEUTROPHILS ABSOLUTE: 5.8 K/UL (ref 1.7–7.7)
NEUTROPHILS RELATIVE PERCENT: 65 %
PDW BLD-RTO: 12.8 % (ref 12.4–15.4)
PERFORMED ON: ABNORMAL
PERFORMED ON: NORMAL
PERFORMED ON: NORMAL
PLATELET # BLD: 142 K/UL (ref 135–450)
PMV BLD AUTO: 9.6 FL (ref 5–10.5)
POTASSIUM SERPL-SCNC: 4 MMOL/L (ref 3.5–5.1)
RBC # BLD: 3.53 M/UL (ref 4–5.2)
SODIUM BLD-SCNC: 139 MMOL/L (ref 136–145)
WBC # BLD: 8.9 K/UL (ref 4–11)

## 2021-08-10 PROCEDURE — 6370000000 HC RX 637 (ALT 250 FOR IP): Performed by: INTERNAL MEDICINE

## 2021-08-10 PROCEDURE — 6370000000 HC RX 637 (ALT 250 FOR IP): Performed by: SURGERY

## 2021-08-10 PROCEDURE — 6360000002 HC RX W HCPCS: Performed by: SURGERY

## 2021-08-10 PROCEDURE — 83735 ASSAY OF MAGNESIUM: CPT

## 2021-08-10 PROCEDURE — 2580000003 HC RX 258: Performed by: SURGERY

## 2021-08-10 PROCEDURE — 36415 COLL VENOUS BLD VENIPUNCTURE: CPT

## 2021-08-10 PROCEDURE — 80048 BASIC METABOLIC PNL TOTAL CA: CPT

## 2021-08-10 PROCEDURE — 1200000000 HC SEMI PRIVATE

## 2021-08-10 PROCEDURE — 85025 COMPLETE CBC W/AUTO DIFF WBC: CPT

## 2021-08-10 RX ORDER — INSULIN GLARGINE 100 [IU]/ML
30 INJECTION, SOLUTION SUBCUTANEOUS ONCE
Status: COMPLETED | OUTPATIENT
Start: 2021-08-10 | End: 2021-08-10

## 2021-08-10 RX ORDER — ASPIRIN 81 MG/1
81 TABLET ORAL DAILY
Status: DISCONTINUED | OUTPATIENT
Start: 2021-08-10 | End: 2021-08-11 | Stop reason: HOSPADM

## 2021-08-10 RX ORDER — INSULIN GLARGINE 100 [IU]/ML
30 INJECTION, SOLUTION SUBCUTANEOUS NIGHTLY
Status: DISCONTINUED | OUTPATIENT
Start: 2021-08-11 | End: 2021-08-11 | Stop reason: HOSPADM

## 2021-08-10 RX ADMIN — GABAPENTIN 300 MG: 300 CAPSULE ORAL at 09:20

## 2021-08-10 RX ADMIN — Medication 10 ML: at 09:20

## 2021-08-10 RX ADMIN — PREDNISONE 60 MG: 20 TABLET ORAL at 09:20

## 2021-08-10 RX ADMIN — PANTOPRAZOLE SODIUM 40 MG: 40 TABLET, DELAYED RELEASE ORAL at 09:20

## 2021-08-10 RX ADMIN — INSULIN LISPRO 6 UNITS: 100 INJECTION, SOLUTION INTRAVENOUS; SUBCUTANEOUS at 17:49

## 2021-08-10 RX ADMIN — ENOXAPARIN SODIUM 40 MG: 40 INJECTION SUBCUTANEOUS at 09:19

## 2021-08-10 RX ADMIN — CLONAZEPAM 0.5 MG: 0.5 TABLET ORAL at 14:41

## 2021-08-10 RX ADMIN — TICAGRELOR 90 MG: 90 TABLET ORAL at 11:30

## 2021-08-10 RX ADMIN — LISINOPRIL 20 MG: 20 TABLET ORAL at 09:20

## 2021-08-10 RX ADMIN — PALIPERIDONE 9 MG: 3 TABLET, EXTENDED RELEASE ORAL at 09:20

## 2021-08-10 RX ADMIN — TICAGRELOR 90 MG: 90 TABLET ORAL at 20:42

## 2021-08-10 RX ADMIN — TRAZODONE HYDROCHLORIDE 100 MG: 50 TABLET ORAL at 20:43

## 2021-08-10 RX ADMIN — INSULIN GLARGINE 30 UNITS: 100 INJECTION, SOLUTION SUBCUTANEOUS at 17:50

## 2021-08-10 RX ADMIN — FLUTICASONE PROPIONATE 1 SPRAY: 50 SPRAY, METERED NASAL at 09:21

## 2021-08-10 RX ADMIN — INSULIN LISPRO 4 UNITS: 100 INJECTION, SOLUTION INTRAVENOUS; SUBCUTANEOUS at 20:43

## 2021-08-10 RX ADMIN — OXYCODONE AND ACETAMINOPHEN 1 TABLET: 7.5; 325 TABLET ORAL at 17:48

## 2021-08-10 RX ADMIN — ATORVASTATIN CALCIUM 20 MG: 10 TABLET, FILM COATED ORAL at 20:43

## 2021-08-10 RX ADMIN — OXYCODONE AND ACETAMINOPHEN 1 TABLET: 7.5; 325 TABLET ORAL at 09:25

## 2021-08-10 RX ADMIN — GABAPENTIN 300 MG: 300 CAPSULE ORAL at 20:43

## 2021-08-10 RX ADMIN — ASPIRIN 81 MG: 81 TABLET, COATED ORAL at 11:30

## 2021-08-10 RX ADMIN — CETIRIZINE HYDROCHLORIDE 10 MG: 10 TABLET, FILM COATED ORAL at 09:20

## 2021-08-10 ASSESSMENT — PAIN SCALES - GENERAL
PAINLEVEL_OUTOF10: 0
PAINLEVEL_OUTOF10: 0
PAINLEVEL_OUTOF10: 8
PAINLEVEL_OUTOF10: 7
PAINLEVEL_OUTOF10: 6
PAINLEVEL_OUTOF10: 0
PAINLEVEL_OUTOF10: 6
PAINLEVEL_OUTOF10: 7

## 2021-08-10 ASSESSMENT — PAIN DESCRIPTION - ONSET: ONSET: AWAKENED FROM SLEEP

## 2021-08-10 ASSESSMENT — PAIN DESCRIPTION - FREQUENCY: FREQUENCY: INTERMITTENT

## 2021-08-10 ASSESSMENT — PAIN DESCRIPTION - PAIN TYPE
TYPE: ACUTE PAIN

## 2021-08-10 ASSESSMENT — PAIN DESCRIPTION - ORIENTATION
ORIENTATION: RIGHT

## 2021-08-10 ASSESSMENT — PAIN DESCRIPTION - LOCATION
LOCATION: EYE

## 2021-08-10 ASSESSMENT — PAIN DESCRIPTION - PROGRESSION: CLINICAL_PROGRESSION: NOT CHANGED

## 2021-08-10 ASSESSMENT — PAIN DESCRIPTION - DESCRIPTORS: DESCRIPTORS: ACHING

## 2021-08-10 NOTE — PLAN OF CARE
Problem: Serum Glucose Level - Abnormal:  Goal: Ability to maintain appropriate glucose levels will improve  Description: Ability to maintain appropriate glucose levels will improve  Outcome: Ongoing   The patient will demonstrate an understanding of s/s & treatment of hyperglycemia, by verbalization,  with the goal of completion at discharge.

## 2021-08-10 NOTE — PROGRESS NOTES
Hospitalist Progress Note      PCP: Joann Moctezuma    Date of Admission: 8/8/2021    Chief Complaint:  R periorbital pain       Subjective: The R periorbital discomfort persists but she says it is better than before. Medications:  Reviewed    Infusion Medications    dextrose      sodium chloride       Scheduled Medications    fluticasone  1 spray Each Nostril Daily    cetirizine  10 mg Oral Daily    insulin glargine  30 Units Subcutaneous BID    enoxaparin  40 mg Subcutaneous Daily    insulin lispro  0.08 Units/kg Subcutaneous TID WC    insulin lispro  0-12 Units Subcutaneous TID WC    insulin lispro  0-6 Units Subcutaneous Nightly    atorvastatin  20 mg Oral Nightly    gabapentin  300 mg Oral BID    lisinopril  20 mg Oral Daily    paliperidone  9 mg Oral QAM    pantoprazole  40 mg Oral Daily    traZODone  100 mg Oral Nightly    predniSONE  60 mg Oral Daily     PRN Meds: glucose, dextrose, glucagon (rDNA), dextrose, clonazePAM, oxyCODONE-acetaminophen, sodium chloride flush, sodium chloride, potassium chloride **OR** potassium alternative oral replacement **OR** potassium chloride, magnesium sulfate, ondansetron **OR** ondansetron, acetaminophen **OR** acetaminophen, melatonin, tetracaine      Intake/Output Summary (Last 24 hours) at 8/10/2021 0939  Last data filed at 8/10/2021 0920  Gross per 24 hour   Intake 660 ml   Output 0 ml   Net 660 ml       Physical Exam Performed:    BP (!) 129/59   Pulse 52   Temp 98.1 °F (36.7 °C) (Oral)   Resp 16   Ht 5' 3\" (1.6 m)   Wt 115 lb 6.4 oz (52.3 kg)   SpO2 100%   BMI 20.44 kg/m²     General appearance: No apparent distress, appears stated age and cooperative. HEENT: Pupils equal, round, and reactive to light. Conjunctivae/corneas clear. Neck: Supple, with full range of motion. No jugular venous distention. Trachea midline. Respiratory:  Normal respiratory effort.  Clear to auscultation, bilaterally without Rales/Wheezes/Rhonchi. Cardiovascular: Regular rate and rhythm with normal S1/S2 without murmurs, rubs or gallops. Abdomen: Soft, non-tender, non-distended with normal bowel sounds. Musculoskeletal: No clubbing, cyanosis or edema bilaterally. Full range of motion without deformity. Skin: Skin color, texture, turgor normal.  No rashes or lesions. Neurologic:  Neurovascularly intact without any focal sensory/motor deficits. Cranial nerves: II-XII intact, grossly non-focal.  Psychiatric: Alert and oriented, thought content appropriate, normal insight  Capillary Refill: Brisk,3 seconds, normal   Peripheral Pulses: +2 palpable, equal bilaterally       Labs:   Recent Labs     08/08/21  1530 08/09/21  0633 08/10/21  0652   WBC 5.1 4.4 8.9   HGB 11.2* 11.2* 10.2*   HCT 33.6* 34.1* 30.9*    139 142     Recent Labs     08/08/21  1530 08/09/21  0633 08/10/21  0652   * 136 139   K 4.4 5.2* 4.0   CL 93* 101 105   CO2 28 25 25   BUN 19 20 21*   CREATININE 1.3* 1.2 1.1   CALCIUM 10.2 9.5 9.0     Recent Labs     08/08/21  1530   AST 35   ALT 78*   BILITOT <0.2   ALKPHOS 220*     Recent Labs     08/08/21  1530   INR 0.91     No results for input(s): Catrachito Jamison in the last 72 hours. Urinalysis:      Lab Results   Component Value Date    NITRU Negative 08/08/2021    WBCUA 0-2 06/02/2021    BACTERIA Rare 06/02/2021    RBCUA 3-4 06/02/2021    BLOODU Negative 08/08/2021    SPECGRAV 1.010 08/08/2021    GLUCOSEU >=1000 08/08/2021    GLUCOSEU >=1000 mg/dL 06/07/2010       Radiology:  CTA HEAD NECK W CONTRAST   Final Result   No flow limiting stenosis or large vessel occlusion detected within the head   or neck. Incidentally noted pulmonary emphysema. CT HEAD WO CONTRAST   Final Result   Minimal chronic microischemic changes scattered in the deep white matter   which is unchanged with no acute abnormality seen.       Moderate chronic sinusitis which is most severe along the right maxillary   antrum and is unchanged. Assessment/Plan:    Active Hospital Problems    Diagnosis     Acute right eye pain [H57.11]     Type 2 diabetes mellitus with hyperglycemia, with long-term current use of insulin ([facility]) [E11.65, Z79.4]     CAD S/P percutaneous coronary angioplasty [I25.10, Z98.61]     Stage 3b chronic kidney disease ([facility] [address] [N18.32]     Uncontrolled type 2 diabetes mellitus with microalbuminuria, with long-term current use of insulin ([facility]) [E11.29, E11.65, R80.9, Z79.4]     Essential hypertension [I10]        Clarissa Krishna is a 57 y.o. female. She presents reporting right eye pain since yesterday. Also describes pain along the supraorbital ridge, maxilla, and preauricular area. Describes her vision as blurry and eye has been watering. She denies diplopia and pain elsewhere. Denies jaw claudication. \"      R periorbital, maxillary, and preauricular discomfort. - per ER exam visual acuity was essentially normal (20/30) and symmetric and intraocular pressure by tonometry also normal.  - could be due to the chronic, moderate, R-sided sinusitis seen on CT. Started fluticasone nasal spray and daily cetirizine. Patient says she has bad seasonal allergies. No compelling evidence of infection. - f/u temporal artery biopsy. Started on steroids on admission. Uncontrolled DM2. Recent A1c 15.3. Patient half-heartedly endorses compliance with her glargine and victoza. Sugars worse here due to steroids. Adjusted insulin. CAD, s/p DAYANA to D1 in 6/2020. Resumed aspirin and ticagrelor after biopsy, f/u with cardiology as outpatient. Statin. CKD3, HTN. Continue lisinopril. I see she is on paliperidone. Unclear whether she might have a psychotic disorder. DVT Prophylaxis: enoxaparin  Diet: ADULT DIET; Regular; 4 carb choices (60 gm/meal)  Code Status: Full Code    PT/OT Eval Status: not indicated    Dispo - likely 8/11 if sugars stable. She lives at home.        MAGGIE RASMUSSEN Klever Navarro MD

## 2021-08-10 NOTE — PROGRESS NOTES
Vascular    S/P R Temporal artery biopsy. Incision intact  Path pending.      F/u with me prn for wound issues  Steroid management per Medicine

## 2021-08-10 NOTE — CARE COORDINATION
CASE MANAGEMENT INITIAL ASSESSMENT      Reviewed chart and completed assessment at bedside with patient    Explained Case Management role/services. yes    Health Care Decision Maker :   Primary Decision Maker: Nel Alexander - Other - [phone]    Secondary Decision Maker (Active): Shahid Natarajan - [phone]    Supplemental (Other) Decision Maker: Damaris Cunningham - Other - [phone]    Supplemental (Other) Decision Maker: Parker Husseinjessica - Other - [phone]        Admit date/status: inpatient  8/8/21  Diagnosis: temporal arteritis     Is this a Readmission?:  No      Insurance: Ambric required for SNF: Yes       3 night stay required: No    Living arrangements, Adls, care needs, prior to admission:lives alone in 2nd floor apt. [place]. Does not drive     Transportation: family/friend     Durable Medical Equipment at home:  Walker_x_Cane_x_RTS__ BSC__Shower Chair__  02__ HHN__ CPAP__  BiPap__  Hospital Bed__ W/C_x_    Services in the home and/or outpatient, prior to admission: none    PT/OT recs: not completed at this time    [address] Notification (HEN): n/a    Barriers to discharge: none    Plan/comments: pt intends to d/c home without needs. Please consult CM team if needs arise.      Yaquelin Santos RN

## 2021-08-10 NOTE — PROGRESS NOTES
FSBS 58. Patient AOx4, asymptomatic. RN writer gave 8oz of [employer] and asked PCA to recheck FSBS in 15 minutes, PCA verbalized understanding. Breakfast has already been ordered for the patient.

## 2021-08-11 VITALS
RESPIRATION RATE: 16 BRPM | HEIGHT: 63 IN | BODY MASS INDEX: 20.45 KG/M2 | OXYGEN SATURATION: 100 % | SYSTOLIC BLOOD PRESSURE: 141 MMHG | DIASTOLIC BLOOD PRESSURE: 73 MMHG | TEMPERATURE: 98.5 F | WEIGHT: 115.4 LBS | HEART RATE: 54 BPM

## 2021-08-11 LAB
ANION GAP SERPL CALCULATED.3IONS-SCNC: 10 MMOL/L (ref 3–16)
BASOPHILS ABSOLUTE: 0 K/UL (ref 0–0.2)
BASOPHILS RELATIVE PERCENT: 0.6 %
BUN BLDV-MCNC: 16 MG/DL (ref 7–20)
CALCIUM SERPL-MCNC: 8.8 MG/DL (ref 8.3–10.6)
CHLORIDE BLD-SCNC: 107 MMOL/L (ref 99–110)
CO2: 26 MMOL/L (ref 21–32)
CREAT SERPL-MCNC: 1 MG/DL (ref 0.6–1.2)
EOSINOPHILS ABSOLUTE: 0 K/UL (ref 0–0.6)
EOSINOPHILS RELATIVE PERCENT: 0.1 %
GFR AFRICAN AMERICAN: >60
GFR NON-AFRICAN AMERICAN: 56
GLUCOSE BLD-MCNC: 101 MG/DL (ref 70–99)
GLUCOSE BLD-MCNC: 145 MG/DL (ref 70–99)
GLUCOSE BLD-MCNC: 91 MG/DL (ref 70–99)
HCT VFR BLD CALC: 31.6 % (ref 36–48)
HEMOGLOBIN: 10.5 G/DL (ref 12–16)
LYMPHOCYTES ABSOLUTE: 2.9 K/UL (ref 1–5.1)
LYMPHOCYTES RELATIVE PERCENT: 39.8 %
MAGNESIUM: 2 MG/DL (ref 1.8–2.4)
MCH RBC QN AUTO: 29.1 PG (ref 26–34)
MCHC RBC AUTO-ENTMCNC: 33.3 G/DL (ref 31–36)
MCV RBC AUTO: 87.4 FL (ref 80–100)
MONOCYTES ABSOLUTE: 0.5 K/UL (ref 0–1.3)
MONOCYTES RELATIVE PERCENT: 6.2 %
NEUTROPHILS ABSOLUTE: 3.9 K/UL (ref 1.7–7.7)
NEUTROPHILS RELATIVE PERCENT: 53.3 %
PDW BLD-RTO: 12.5 % (ref 12.4–15.4)
PERFORMED ON: ABNORMAL
PERFORMED ON: ABNORMAL
PLATELET # BLD: 144 K/UL (ref 135–450)
PMV BLD AUTO: 9.6 FL (ref 5–10.5)
POTASSIUM SERPL-SCNC: 3.7 MMOL/L (ref 3.5–5.1)
RBC # BLD: 3.61 M/UL (ref 4–5.2)
SODIUM BLD-SCNC: 143 MMOL/L (ref 136–145)
WBC # BLD: 7.4 K/UL (ref 4–11)

## 2021-08-11 PROCEDURE — 36415 COLL VENOUS BLD VENIPUNCTURE: CPT

## 2021-08-11 PROCEDURE — 6370000000 HC RX 637 (ALT 250 FOR IP): Performed by: INTERNAL MEDICINE

## 2021-08-11 PROCEDURE — 83735 ASSAY OF MAGNESIUM: CPT

## 2021-08-11 PROCEDURE — 85025 COMPLETE CBC W/AUTO DIFF WBC: CPT

## 2021-08-11 PROCEDURE — 6370000000 HC RX 637 (ALT 250 FOR IP): Performed by: SURGERY

## 2021-08-11 PROCEDURE — 80048 BASIC METABOLIC PNL TOTAL CA: CPT

## 2021-08-11 PROCEDURE — 6360000002 HC RX W HCPCS: Performed by: SURGERY

## 2021-08-11 RX ORDER — CETIRIZINE HYDROCHLORIDE 10 MG/1
10 TABLET ORAL DAILY
Qty: 30 TABLET | Refills: 1 | Status: SHIPPED | OUTPATIENT
Start: 2021-08-11 | End: 2021-10-06

## 2021-08-11 RX ORDER — INSULIN GLARGINE 100 [IU]/ML
35 INJECTION, SOLUTION SUBCUTANEOUS NIGHTLY
Qty: 1 PEN | Refills: 1 | Status: SHIPPED | OUTPATIENT
Start: 2021-08-11 | End: 2022-05-11 | Stop reason: SDUPTHER

## 2021-08-11 RX ORDER — FLUTICASONE PROPIONATE 50 MCG
1 SPRAY, SUSPENSION (ML) NASAL DAILY
Qty: 1 BOTTLE | Refills: 1 | Status: SHIPPED | OUTPATIENT
Start: 2021-08-12 | End: 2022-05-11

## 2021-08-11 RX ADMIN — FLUTICASONE PROPIONATE 1 SPRAY: 50 SPRAY, METERED NASAL at 08:35

## 2021-08-11 RX ADMIN — CETIRIZINE HYDROCHLORIDE 10 MG: 10 TABLET, FILM COATED ORAL at 08:34

## 2021-08-11 RX ADMIN — TICAGRELOR 90 MG: 90 TABLET ORAL at 08:34

## 2021-08-11 RX ADMIN — LISINOPRIL 20 MG: 20 TABLET ORAL at 08:34

## 2021-08-11 RX ADMIN — PALIPERIDONE 9 MG: 3 TABLET, EXTENDED RELEASE ORAL at 08:40

## 2021-08-11 RX ADMIN — PANTOPRAZOLE SODIUM 40 MG: 40 TABLET, DELAYED RELEASE ORAL at 08:34

## 2021-08-11 RX ADMIN — INSULIN LISPRO 2 UNITS: 100 INJECTION, SOLUTION INTRAVENOUS; SUBCUTANEOUS at 13:08

## 2021-08-11 RX ADMIN — GABAPENTIN 300 MG: 300 CAPSULE ORAL at 08:35

## 2021-08-11 RX ADMIN — OXYCODONE AND ACETAMINOPHEN 1 TABLET: 7.5; 325 TABLET ORAL at 08:34

## 2021-08-11 RX ADMIN — ASPIRIN 81 MG: 81 TABLET, COATED ORAL at 08:35

## 2021-08-11 RX ADMIN — ENOXAPARIN SODIUM 40 MG: 40 INJECTION SUBCUTANEOUS at 08:35

## 2021-08-11 ASSESSMENT — PAIN DESCRIPTION - LOCATION: LOCATION: EYE

## 2021-08-11 ASSESSMENT — PAIN SCALES - GENERAL
PAINLEVEL_OUTOF10: 6
PAINLEVEL_OUTOF10: 0

## 2021-08-11 ASSESSMENT — PAIN DESCRIPTION - PAIN TYPE: TYPE: ACUTE PAIN

## 2021-08-11 ASSESSMENT — PAIN DESCRIPTION - ORIENTATION: ORIENTATION: RIGHT

## 2021-08-11 NOTE — PLAN OF CARE
The patient will demonstrate an understanding of proper nutrition by verbalization,  with the goal of completion at discharge.

## 2021-08-11 NOTE — DISCHARGE SUMMARY
Hospital Medicine Discharge Summary    Patient ID: Irene Bush      Patient's PCP: Mercedes Gannon Date: 8/8/2021     Discharge Date:   08/11/21     Admitting Physician: Darrick Carrizales MD     Discharge Physician: Bree Balbuena MD     Discharge Diagnoses: Active Hospital Problems    Diagnosis     Acute right eye pain [H57.11]     Type 2 diabetes mellitus with hyperglycemia, with long-term current use of insulin (HCC) [E11.65, Z79.4]     CAD S/P percutaneous coronary angioplasty [I25.10, Z98.61]     Stage 3b chronic kidney disease ([facility] [address] [N18.32]     Uncontrolled type 2 diabetes mellitus with microalbuminuria, with long-term current use of insulin (HCC) [E11.29, E11.65, R80.9, Z79.4]     Essential hypertension [I10]        The patient was seen and examined on day of discharge and this discharge summary is in conjunction with any daily progress note from day of discharge. Hospital Course:  \"Agustina Fried is a 57 y. o. female.   She presents reporting right eye pain since yesterday.  Also describes pain along the supraorbital ridge, maxilla, and preauricular area.  Describes her vision as blurry and eye has been watering.  She denies diplopia and pain elsewhere.  Denies jaw claudication. \"        R periorbital, maxillary, and preauricular discomfort. - per ER exam visual acuity was essentially normal (20/30) and symmetric and intraocular pressure by tonometry also normal.  - could be due to the chronic, moderate, R-sided sinusitis as seen on CT. Started fluticasone nasal spray and daily cetirizine. Patient says she has bad seasonal allergies and rhinorrhea. No compelling evidence of infection. She improved markedly and will continue these medications upon discharge. - Temporal artery biopsy was not suggestive of arteritis. Started on steroids on admission but these were not continued since clinically this didn't seem like temporal arteritis.      Uncontrolled DM2.   Recent A1c 15.3. Patient adamantly endorses compliance with her insulin and victoza. Sugars worse here due to steroids. Adjusted insulin regimen and encouraged compliance. It doesn't look like we were able to input her medication list correctly during this admission so the medication reconciliation is innacurate. She says she usually takes 26u QHS and 9u TIDAC. Sugars fairly controlled here. Will increase to 35u QHS and 9u TIDAC for now. F/u with PCP.     CAD, s/p DAYANA to D1 in 6/2020. Resumed aspirin and ticagrelor after biopsy, f/u with cardiology as outpatient. Statin.     CKD3, HTN. Continue lisinopril.     I see she is on paliperidone. Unclear whether she might have a psychotic disorder. Physical Exam Performed:     BP (!) 141/73   Pulse 54   Temp 98.5 °F (36.9 °C) (Oral)   Resp 16   Ht 5' 3\" (1.6 m)   Wt 115 lb 6.4 oz (52.3 kg)   SpO2 100%   BMI 20.44 kg/m²       General appearance:  No apparent distress, appears stated age and cooperative. HEENT:  Normal cephalic, atraumatic without obvious deformity. Pupils equal, round, and reactive to light. Extra ocular muscles intact. Conjunctivae/corneas clear. Neck: Supple, with full range of motion. No jugular venous distention. Trachea midline. Respiratory:  Normal respiratory effort. Clear to auscultation, bilaterally without Rales/Wheezes/Rhonchi. Cardiovascular:  Regular rate and rhythm with normal S1/S2 without murmurs, rubs or gallops. Abdomen: Soft, non-tender, non-distended with normal bowel sounds. Musculoskeletal:  No clubbing, cyanosis or edema bilaterally. Full range of motion without deformity. Skin: Skin color, texture, turgor normal.  No rashes or lesions. Neurologic:  Neurovascularly intact without any focal sensory/motor deficits. Cranial nerves: II-XII intact, grossly non-focal.  Psychiatric:  Alert and oriented, thought content appropriate, normal insight.     Capillary Refill: Brisk,< 3 seconds   Peripheral Pulses: +2 palpable, equal bilaterally       Labs: For convenience and continuity at follow-up the following most recent labs are provided:      CBC:    Lab Results   Component Value Date    WBC 7.4 08/11/2021    HGB 10.5 08/11/2021    HCT 31.6 08/11/2021     08/11/2021       Renal:    Lab Results   Component Value Date     08/11/2021    K 3.7 08/11/2021    K 3.4 06/04/2021     08/11/2021    CO2 26 08/11/2021    BUN 16 08/11/2021    CREATININE 1.0 08/11/2021    CALCIUM 8.8 08/11/2021    PHOS 2.9 03/06/2021         Significant Diagnostic Studies    Radiology:   CTA HEAD NECK W CONTRAST   Final Result   No flow limiting stenosis or large vessel occlusion detected within the head   or neck. Incidentally noted pulmonary emphysema. CT HEAD WO CONTRAST   Final Result   Minimal chronic microischemic changes scattered in the deep white matter   which is unchanged with no acute abnormality seen. Moderate chronic sinusitis which is most severe along the right maxillary   antrum and is unchanged. Consults:     IP CONSULT TO HOSPITALIST  IP CONSULT TO VASCULAR SURGERY    Disposition:  home     Condition at Discharge: Stable    Discharge Instructions/Follow-up:  Follow up with PCP within 1-2 weeks.        Code Status:  Full Code     Activity: activity as tolerated    Diet: diabetic diet      Discharge Medications:     Current Discharge Medication List           Details   fluticasone (FLONASE) 50 MCG/ACT nasal spray 1 spray by Each Nostril route daily  Qty: 1 Bottle, Refills: 1              Details   cetirizine (ZYRTEC) 10 MG tablet Take 1 tablet by mouth daily  Qty: 30 tablet, Refills: 1      insulin glargine (LANTUS SOLOSTAR) 100 UNIT/ML injection pen Inject 35 Units into the skin nightly  Qty: 1 pen, Refills: 1    Associated Diagnoses: Uncontrolled type 2 diabetes mellitus with microalbuminuria, with long-term current use of insulin ([facility])              Details   TRUE METRIX BLOOD GLUCOSE TEST strip USE AS DIRECTED TO TEST 4 TIMES A DAY  Qty: 200 strip, Refills: 5    Associated Diagnoses: Uncontrolled type 2 diabetes mellitus with microalbuminuria, with long-term current use of insulin ([facility])      Continuous Blood Gluc  (FREESTYLE CHRISTY 2 READER) GINA Use for personal CGMS. On Multiple insulin shots, checks blood sugars 4 times per day and requires frequent insulin adjustment. Qty: 1 each, Refills: 0    Associated Diagnoses: Uncontrolled type 2 diabetes mellitus with microalbuminuria, with long-term current use of insulin ([address] Type 2 diabetes mellitus with other diabetic kidney complication ([facility])      Continuous Blood Gluc Sensor (FREESTYLE CHRISTY 2 SENSOR) MISC Replace every 2 weeks  Qty: 2 each, Refills: 5    Associated Diagnoses: Uncontrolled type 2 diabetes mellitus with microalbuminuria, with long-term current use of insulin ([address] Type 2 diabetes mellitus with other diabetic kidney complication ([facility])      VICTOZA 18 MG/3ML SOPN SC injection INJECT 1.8 MG UNDER THE SKIN ONCE DAILY      Blood Glucose Monitoring Suppl (TRUE METRIX METER) w/Device KIT Used to  check blood sugar 4 times eyad  Qty: 1 kit, Refills: 1      gabapentin (NEURONTIN) 600 MG tablet Take 0.5 tablets by mouth 2 times daily for 3 days. Qty: 90 tablet, Refills: 3      atorvastatin (LIPITOR) 20 MG tablet Take 1 tablet by mouth daily  Qty: 30 tablet, Refills: 5      paliperidone (INVEGA) 9 MG extended release tablet Take 9 mg by mouth every morning       pantoprazole (PROTONIX) 40 MG tablet Take 40 mg by mouth daily       ferrous sulfate (IRON 325) 325 (65 Fe) MG tablet Take 325 mg by mouth daily (with breakfast)      oxyCODONE-acetaminophen (PERCOCET) 7.5-325 MG per tablet TAKE 1 TABLET BY MOUTH EVERY 6 (SIX) HOURS AS NEEDED FOR UP TO 28 DAYS. clonazePAM (KLONOPIN) 0.5 MG tablet Take 0.5 mg by mouth 3 times daily as needed.       ticagrelor (BRILINTA) 90 MG TABS tablet Take 1 tablet by mouth 2 times daily  Qty: 60 tablet, Refills: 1      nitroGLYCERIN (NITROSTAT) 0.4 MG SL tablet up to max of 3 total doses. If no relief after 1 dose, call 911. Qty: 25 tablet, Refills: 3      calcium carbonate-vitamin D (CALTRATE) 600-400 MG-UNIT TABS per tab Take 1 tablet by mouth daily       aspirin 81 MG tablet Take 81 mg by mouth daily      traZODone (DESYREL) 100 MG tablet Take 100 mg by mouth nightly      lisinopril (PRINIVIL;ZESTRIL) 40 MG tablet Take 0.5 tablets by mouth daily  Qty: 90 tablet, Refills: 1               Time Spent on discharge is more than 30 minutes in the examination, evaluation, counseling and review of medications and discharge plan. Signed:    Kaila Boudreaux MD   8/11/2021      Thank you Annie De La Torre for the opportunity to be involved in this patient's care. If you have any questions or concerns please feel free to contact me at [phone].

## 2021-08-11 NOTE — PROGRESS NOTES
Pt d/c'd 8/11/21. IV access removed with no complications. Notified [facility] and removed tele box. Reviewed d/c instructions, home meds, and f/u information utilizing teach-back method. Patient verbalized understanding. Patient assisted to lobby in wheelchair and left with all documented belongings.

## 2021-08-17 ENCOUNTER — VIRTUAL VISIT (OUTPATIENT)
Dept: ENDOCRINOLOGY | Age: 61
End: 2021-08-17
Payer: MEDICAID

## 2021-08-17 DIAGNOSIS — I10 ESSENTIAL (PRIMARY) HYPERTENSION: ICD-10-CM

## 2021-08-17 DIAGNOSIS — E11.21 TYPE 2 DIABETES MELLITUS WITH DIABETIC NEPHROPATHY, WITH LONG-TERM CURRENT USE OF INSULIN (HCC): ICD-10-CM

## 2021-08-17 DIAGNOSIS — Z79.4 TYPE 2 DIABETES MELLITUS WITH DIABETIC NEPHROPATHY, WITH LONG-TERM CURRENT USE OF INSULIN (HCC): ICD-10-CM

## 2021-08-17 DIAGNOSIS — E11.29 TYPE 2 DIABETES MELLITUS WITH OTHER DIABETIC KIDNEY COMPLICATION (HCC): ICD-10-CM

## 2021-08-17 PROCEDURE — 99441 PR PHYS/QHP TELEPHONE EVALUATION 5-10 MIN: CPT | Performed by: INTERNAL MEDICINE

## 2021-08-17 RX ORDER — INSULIN LISPRO 100 [IU]/ML
8 INJECTION, SOLUTION INTRAVENOUS; SUBCUTANEOUS 3 TIMES DAILY
COMMUNITY
End: 2022-10-27 | Stop reason: SDUPTHER

## 2021-08-17 RX ORDER — GLIMEPIRIDE 2 MG/1
TABLET ORAL
Qty: 30 TABLET | Refills: 5 | Status: SHIPPED | OUTPATIENT
Start: 2021-08-17 | End: 2022-05-20 | Stop reason: SDUPTHER

## 2021-08-17 RX ORDER — EMPAGLIFLOZIN 25 MG/1
25 TABLET, FILM COATED ORAL DAILY
COMMUNITY
End: 2021-10-06

## 2021-08-17 RX ORDER — SIMVASTATIN 20 MG
20 TABLET ORAL NIGHTLY
Status: ON HOLD | COMMUNITY
End: 2021-09-29 | Stop reason: SDUPTHER

## 2021-08-17 NOTE — PROGRESS NOTES
Patient ID:   Tyrus Phalen is a 57 y.o. female  Chief Complaint:   Tyrus Phalen presents for an evaluation of Type 2 Diabetes Mellitus , Hyperlipidemia and hypertension. Pursuant to the emergency declaration under the [facility] [place] [address] [facility] authority and the [employer] Act this Telephone Visit was insisted, with patient's consent, to reduce the patient's risk of exposure to COVID-19 and provide continuity of care for an established patient. Services were provided through a synchronous discussion over a telephone to substitute for in-person clinic visit. Subjective:   Type 2 Diabetes Mellitus diagnosed around 2010  On insulin since 2012  Previous regimen:Taking Admelog 15 units tidac and 5 units for snacks. Basaglar 40 units once a day at night. VGO stopped due to hypoglycemias     Multiple hospitalizations in 2021 due to weakness, anemia   She is recently released from a nursing home     Not taking:   Synjardy 12.5-1000mg, two tabs in am. Last pick ups: Aug 2020 and Feb 2021. Victoza 1.8 mg daily in AM  - stopped as well     Admits making poor dietary choices, trying to cut down fried. Lantus 26 units daily in am.    Novolog SSI as needed      Checks blood sugars 2-3 times per day. Reportedly 119, 244, 304, 282, 234, 266, 295, 90, [id number]   AM:     Lunch:   Supper:    HS:        Hypoglycemias: Once in last 4 weeks      Meals: 3, dinner is big. Snacks (jellos, fruits, pretzels) after every meal because she is hungry. Exercise: None    Denies chest pain, exertional dyspnea. Family history of CAD: Both parents  Denies smoking/ alcohol.         The following portions of the patient's history were reviewed and updated as appropriate:       Family History   Problem Relation Age of Onset    Cancer Mother         breast    Cancer Father     Heart Failure Neg Hx     High Cholesterol Neg Hx     Hypertension Neg Hx     Migraines Neg Hx     Rashes/Skin Problems Neg Hx     Seizures Neg Hx     Stroke Neg Hx     Thyroid Disease Neg Hx     Diabetes Neg Hx          Social History     Socioeconomic History    Marital status:      Spouse name: Not on file    Number of children: 1    Years of education: Not on file    Highest education level: Not on file   Occupational History    Occupation:    Tobacco Use    Smoking status: Former Smoker     Packs/day: 0.50     Years: 20.00     Pack years: 10.00     Types: Cigarettes, Cigars     Quit date: 7/3/2014     Years since quittin.1    Smokeless tobacco: Never Used   Vaping Use    Vaping Use: Never used   Substance and Sexual Activity    Alcohol use: No     Alcohol/week: 0.0 standard drinks    Drug use: No    Sexual activity: Never   Other Topics Concern    Not on file   Social History Narrative    Not on file     Social Determinants of Health     Financial Resource Strain:     Difficulty of Paying Living Expenses:    Food Insecurity:     Worried About Running Out of Food in the Last Year:     Ran Out of Food in the Last Year:    Transportation Needs:     Lack of Transportation (Medical):      Lack of Transportation (Non-Medical):    Physical Activity:     Days of Exercise per Week:     Minutes of Exercise per Session:    Stress:     Feeling of Stress :    Social Connections:     Frequency of Communication with Friends and Family:     Frequency of Social Gatherings with Friends and Family:     Attends [employer] Services:     Active Member of Clubs or Organizations:     Attends Club or Organization Meetings:     Marital Status:    Intimate Partner Violence:     Fear of Current or Ex-Partner:     Emotionally Abused:     Physically Abused:     Sexually Abused:        Past Medical History:   Diagnosis Date    Abnormal brain MRI 2017    Partially empty sella and minimal chronic small vessel ischemic disease    Acute bilateral low back pain without sciatica 11/2/2016    SHARRON (acute kidney injury) ([facility] [address] 7/5/2017    Arthritis     back    Bipolar disorder ([address] 10/18/2008    CAD (coronary artery disease)     stent placed 6/8/20    Cancer [facility]) 2015    bilateral breast:s/p lumpectomy/radiation:under care care of breast specialist:Dr. Boone     Carotid stenosis, bilateral:<50%:per US 7/2016 7/15/2016    Carpal tunnel syndrome 10/18/2008    Cervical cancer screening 2014    Nml per pt'.  Coronary artery disease of native artery of native heart with stable angina pectoris ([facility] [address] 6/9/2020    DDD (degenerative disc disease), lumbar 7/18/2018    Depression     under care of pschiatrist:Dr. 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     Scoliosis     Spondylosis of lumbar region without myelopathy or radiculopathy 3/10/2017    Transient cerebral ischemia 07/15/2016    TIA:7/10/16    Unspecified cerebral artery occlusion with cerebral infarction     TIA       Past Surgical History:   Procedure Laterality Date    BREAST LUMPECTOMY  2015    Bilateral:breast cancer    CARDIAC CATHETERIZATION  06/08/2020    Dr. Christina Adkins), DAYANA to Diag 1    COLONOSCOPY N/A 2/1/2021    COLONOSCOPY DIAGNOSTIC performed by Remington Kelly MD at [place] [address]  2/3/2021    CT BONE MARROW BIOPSY 2/3/2021 Evan Martínez MD [facility] AT [place] CT SCAN    HYSTERECTOMY Benign:no cervical cancer per pt'    KIDNEY REMOVAL      right    OTHER SURGICAL HISTORY Right     orif right ankle    TEMPORAL ARTERY BIOPSY Right 8/9/2021    RIGHT TEMPORAL ARTERY BIOPSY LIGATION performed by Milana Archuleta MD at [facility] ENDOSCOPY N/A 1/29/2021    EGD BIOPSY performed by Mp Vilchis MD at [facility]   Allergen Reactions    Morphine Anaphylaxis and Hives     feels like throat is closing    Penicillins Hives and Swelling    Codeine Hives and Rash    Penicillin G Rash         Current Outpatient Medications:     empagliflozin (JARDIANCE) 25 MG tablet, Take 25 mg by mouth daily, Disp: , Rfl:     simvastatin (ZOCOR) 20 MG tablet, Take 20 mg by mouth nightly, Disp: , Rfl:     insulin lispro, 1 Unit Dial, (HUMALOG KWIKPEN) 100 UNIT/ML SOPN, Inject into the skin 3 times daily, Disp: , Rfl:     glimepiride (AMARYL) 2 MG tablet, 2 mg with breakfast or first meal of the day, Disp: 30 tablet, Rfl: 5    cetirizine (ZYRTEC) 10 MG tablet, Take 1 tablet by mouth daily, Disp: 30 tablet, Rfl: 1    Blood Glucose Monitoring Suppl (TRUE METRIX METER) w/Device KIT, Used to  check blood sugar 4 times eyad, Disp: 1 kit, Rfl: 1    lisinopril (PRINIVIL;ZESTRIL) 40 MG tablet, Take 0.5 tablets by mouth daily (Patient taking differently: Take 40 mg by mouth daily ), Disp: 90 tablet, Rfl: 1    atorvastatin (LIPITOR) 20 MG tablet, Take 1 tablet by mouth daily, Disp: 30 tablet, Rfl: 5    paliperidone (INVEGA) 9 MG extended release tablet, Take 9 mg by mouth every morning , Disp: , Rfl:     ferrous sulfate (IRON 325) 325 (65 Fe) MG tablet, Take 325 mg by mouth daily (with breakfast), Disp: , Rfl:     calcium carbonate-vitamin D (CALTRATE) 600-400 MG-UNIT TABS per tab, Take 1 tablet by mouth daily , Disp: , Rfl:     aspirin 81 MG tablet, Take 81 mg by mouth daily, Disp: , Rfl:     traZODone (DESYREL) 100 MG tablet, Take 100 mg by mouth nightly, Disp: , Rfl:     fluticasone (FLONASE) 50 MCG/ACT nasal spray, 1 spray by Each Nostril route daily, Disp: 1 Bottle, Rfl: 1    insulin glargine (LANTUS SOLOSTAR) 100 UNIT/ML injection pen, Inject 35 Units into the skin nightly (Patient taking differently: Inject 26 Units into the skin every morning ), Disp: 1 pen, Rfl: 1    TRUE METRIX BLOOD GLUCOSE TEST strip, USE AS DIRECTED TO TEST 4 TIMES A DAY, Disp: 200 strip, Rfl: 5    VICTOZA 18 MG/3ML SOPN SC injection, INJECT 1.8 MG UNDER THE SKIN ONCE DAILY, Disp: , Rfl:     gabapentin (NEURONTIN) 600 MG tablet, Take 0.5 tablets by mouth 2 times daily for 3 days. (Patient taking differently: Take 600 mg by mouth 3 times daily. ), Disp: 90 tablet, Rfl: 3    pantoprazole (PROTONIX) 40 MG tablet, Take 40 mg by mouth daily , Disp: , Rfl:     oxyCODONE-acetaminophen (PERCOCET) 7.5-325 MG per tablet, TAKE 1 TABLET BY MOUTH EVERY 6 (SIX) HOURS AS NEEDED FOR UP TO 28 DAYS., Disp: , Rfl:     clonazePAM (KLONOPIN) 0.5 MG tablet, Take 0.5 mg by mouth 3 times daily as needed. , Disp: , Rfl:     ticagrelor (BRILINTA) 90 MG TABS tablet, Take 1 tablet by mouth 2 times daily, Disp: 60 tablet, Rfl: 1    nitroGLYCERIN (NITROSTAT) 0.4 MG SL tablet, up to max of 3 total doses. If no relief after 1 dose, call 911., Disp: 25 tablet, Rfl: 3      Review of Systems:    Constitutional: Negative for fever, chills, and unexpected weight change. HENT: Negative for congestion, ear pain, rhinorrhea,  sore throat and trouble swallowing. Eyes: Negative for photophobia, redness, itching. Respiratory: Negative for cough, shortness of breath and sputum. Cardiovascular: Negative for chest pain, palpitations and leg swelling. Gastrointestinal: Negative for nausea, vomiting, abdominal pain, diarrhea, constipation. Endocrine: Negative for cold intolerance, heat intolerance, polydipsia, polyphagia and polyuria.    Genitourinary: Negative for dysuria, urgency, frequency, hematuria and flank pain. Musculoskeletal: Negative for myalgias, back pain, arthralgias and neck pain. Skin/Nail: Negative for rash, itching. Normal nails. Neurological: Negative for seizures, weakness, light-headedness, numbness and headaches. Hematological/ Lymph nodes: Negative for adenopathy. Does not bruise/bleed easily. Psychiatric/Behavioral: Negative for suicidal ideas, depression, anxiety, sleep disturbance and decreased concentration. Objective:   Physical Exam:  There were no vitals taken for this visit. Lab Review:    No results displayed because visit has over 200 results. No results displayed because visit has over 200 results.       Admission on 05/18/2021, Discharged on 05/20/2021   Component Date Value Ref Range Status    Ventricular Rate 05/18/2021 59  BPM Final    Atrial Rate 05/18/2021 59  BPM Final    P-R Interval 05/18/2021 160  ms Final    QRS Duration 05/18/2021 82  ms Final    Q-T Interval 05/18/2021 454  ms Final    QTc Calculation (Bazett) 05/18/2021 449  ms Final    P Axis 05/18/2021 45  degrees Final    R Axis 05/18/2021 -24  degrees Final    T Axis 05/18/2021 59  degrees Final    Diagnosis 05/18/2021 Sinus bradycardiaOtherwise normal ECGWhen compared with ECG of 05-MAR-2021 20:33,No significant change was foundConfirmed by Rose Rodriguez ([id number]) on 5/20/2021 3:58:16 PM   Final    WBC 05/18/2021 4.6  4.0 - 11.0 K/uL Final    RBC 05/18/2021 3.84* 4.00 - 5.20 M/uL Final    Hemoglobin 05/18/2021 10.8* 12.0 - 16.0 g/dL Final    Hematocrit 05/18/2021 34.8* 36.0 - 48.0 % Final    MCV 05/18/2021 90.6  80.0 - 100.0 fL Final    MCH 05/18/2021 28.1  26.0 - 34.0 pg Final    MCHC 05/18/2021 31.1  31.0 - 36.0 g/dL Final    RDW 05/18/2021 13.5  12.4 - 15.4 % Final    Platelets 53/30/8877 160  135 - 450 K/uL Final    MPV 05/18/2021 10.1  5.0 - 10.5 fL Final    Neutrophils % 05/18/2021 58.4  % Final    Lymphocytes % 05/18/2021 34.8  % Final    Monocytes % 05/18/2021 5.6  % Final    Eosinophils % 05/18/2021 0.9  % Final    Basophils % 05/18/2021 0.3  % Final    Neutrophils Absolute 05/18/2021 2.7  1.7 - 7.7 K/uL Final    Lymphocytes Absolute 05/18/2021 1.6  1.0 - 5.1 K/uL Final    Monocytes Absolute 05/18/2021 0.3  0.0 - 1.3 K/uL Final    Eosinophils Absolute 05/18/2021 0.0  0.0 - 0.6 K/uL Final    Basophils Absolute 05/18/2021 0.0  0.0 - 0.2 K/uL Final    Sodium 05/18/2021 129* 136 - 145 mmol/L Final    Potassium 05/18/2021 4.8  3.5 - 5.1 mmol/L Final    Chloride 05/18/2021 93* 99 - 110 mmol/L Final    CO2 05/18/2021 24  21 - 32 mmol/L Final    Anion Gap 05/18/2021 12  3 - 16 Final    Glucose 05/18/2021 839* 70 - 99 mg/dL Final    BUN 05/18/2021 13  7 - 20 mg/dL Final    CREATININE 05/18/2021 1.6* 0.6 - 1.2 mg/dL Final    GFR Non- 05/18/2021 33* >60 Final    GFR  05/18/2021 39* >60 Final    Calcium 05/18/2021 9.3  8.3 - 10.6 mg/dL Final    Total Protein 05/18/2021 7.6  6.4 - 8.2 g/dL Final    Albumin 05/18/2021 3.7  3.4 - 5.0 g/dL Final    Albumin/Globulin Ratio 05/18/2021 0.9* 1.1 - 2.2 Final    Total Bilirubin 05/18/2021 <0.2  0.0 - 1.0 mg/dL Final    Alkaline Phosphatase 05/18/2021 198* 40 - 129 U/L Final    ALT 05/18/2021 34  10 - 40 U/L Final    AST 05/18/2021 26  15 - 37 U/L Final    Globulin 05/18/2021 3.9  g/dL Final    Troponin 05/18/2021 <0.01  <0.01 ng/mL Final    pH, Kyle 05/18/2021 7.499* 7.350 - 7.450 Final    pCO2, Kyle 05/18/2021 31.9* 40.0 - 50.0 mmHg Final    pO2, Kyle 05/18/2021 132.9* 25 - 40 mmHg Final    HCO3, Venous 05/18/2021 24.3  23.0 - 29.0 mmol/L Final    Base Excess, Kyle 05/18/2021 1.5  -3.0 - 3.0 mmol/L Final    O2 Sat, [facility] 05/18/2021 99  Not Established % Final    Carboxyhemoglobin 05/18/2021 5.2* 0.0 - 1.5 % Final    MetHgb, Kyle 05/18/2021 0.4  <1.5 % Final    TC02 (Calc), Kyle 05/18/2021 25  Not Established mmol/L Final    O2 Content, [facility] 05/18/2021 14  Not Established VOL % Final    O2 Therapy 05/18/2021 Unknown   Final    Color, UA 05/18/2021 Yellow  Straw/Yellow Final    Clarity, UA 05/18/2021 Clear  Clear Final    Glucose, Ur 05/18/2021 >=1000* Negative mg/dL Final    Bilirubin Urine 05/18/2021 Negative  Negative Final    Ketones, Urine 05/18/2021 Negative  Negative mg/dL Final    Specific Gravity, UA 05/18/2021 1.010  1.005 - 1.030 Final    Blood, Urine 05/18/2021 Negative  Negative Final    pH, UA 05/18/2021 6.0  5.0 - 8.0 Final    Protein, UA 05/18/2021 Negative  Negative mg/dL Final    Urobilinogen, Urine 05/18/2021 0.2  <2.0 E.U./dL Final    Nitrite, Urine 05/18/2021 Negative  Negative Final    Leukocyte Esterase, Urine 05/18/2021 Negative  Negative Final    Microscopic Examination 05/18/2021 Not Indicated   Final    Urine Type 05/18/2021 NotGiven   Final    Beta-Hydroxybutyrate 05/18/2021 0.19  0.00 - 0.27 mmol/L Final    POC Glucose 05/18/2021 >600* 70 - 99 mg/dl Final    Performed on 05/18/2021 ACCU-CHEK   Final    Glucose 05/18/2021   mg/dL Final    QC OK? 05/18/2021 yes   Final    POC Glucose 05/18/2021 >600* 70 - 99 mg/dl Final    Performed on 05/18/2021 ACCU-CHEK   Final    Sodium 05/19/2021 134* 136 - 145 mmol/L Final    Potassium reflex Magnesium 05/19/2021 4.2  3.5 - 5.1 mmol/L Final    Chloride 05/19/2021 100  99 - 110 mmol/L Final    CO2 05/19/2021 22  21 - 32 mmol/L Final    Anion Gap 05/19/2021 12  3 - 16 Final    Glucose 05/19/2021 582* 70 - 99 mg/dL Final    BUN 05/19/2021 12  7 - 20 mg/dL Final    CREATININE 05/19/2021 1.2  0.6 - 1.2 mg/dL Final    GFR Non- 05/19/2021 45* >60 Final    GFR  05/19/2021 55* >60 Final    Calcium 05/19/2021 8.8  8.3 - 10.6 mg/dL Final    Sodium 05/19/2021 141  136 - 145 mmol/L Final    Potassium reflex Magnesium 05/19/2021 3.7  3.5 - 5.1 mmol/L Final    Chloride 05/19/2021 105  99 - 110 mmol/L Final    CO2 05/19/2021 30  21 - 32 mmol/L Final    Anion Gap 05/19/2021 6  3 - 16 Final    Glucose 05/19/2021 255* 70 - 99 mg/dL Final    BUN 05/19/2021 10  7 - 20 mg/dL Final    CREATININE 05/19/2021 1.0  0.6 - 1.2 mg/dL Final    GFR Non- 05/19/2021 56* >60 Final    GFR  05/19/2021 >60  >60 Final    Calcium 05/19/2021 9.4  8.3 - 10.6 mg/dL Final    Troponin 05/19/2021 <0.01  <0.01 ng/mL Final    Troponin 05/19/2021 <0.01  <0.01 ng/mL Final    POC Glucose 05/19/2021 338* 70 - 99 mg/dl Final    Performed on 05/19/2021 ACCU-CHEK   Final    POC Glucose 05/19/2021 198* 70 - 99 mg/dl Final    Performed on 05/19/2021 ACCU-CHEK   Final    POC Glucose 05/19/2021 149* 70 - 99 mg/dl Final    Performed on 05/19/2021 ACCU-CHEK   Final    Left Ventricular Ejection Fraction 05/20/2021 53   Final-Edited    LVEF MODALITY 05/20/2021 ECHO   Final-Edited    Left Ventricular Ejection Fraction 05/20/2021 36   Final-Edited    LVEF MODALITY 05/20/2021 Nuclear   Final-Edited    POC Glucose 05/19/2021 51* 70 - 99 mg/dl Final    Performed on 05/19/2021 ACCU-CHEK   Final    POC Glucose 05/19/2021 59* 70 - 99 mg/dl Final    Performed on 05/19/2021 ACCU-CHEK   Final    POC Glucose 05/19/2021 67* 70 - 99 mg/dl Final    Performed on 05/19/2021 ACCU-CHEK   Final    POC Glucose 05/19/2021 154* 70 - 99 mg/dl Final    Performed on 05/19/2021 ACCU-CHEK   Final    POC Glucose 05/20/2021 91  70 - 99 mg/dl Final    Performed on 05/20/2021 ACCU-CHEK   Final    POC Glucose 05/20/2021 75  70 - 99 mg/dl Final    Performed on 05/20/2021 ACCU-CHEK   Final    POC Glucose 05/20/2021 183* 70 - 99 mg/dl Final    Performed on 05/20/2021 ACCU-CHEK   Final    POC Glucose 05/20/2021 248* 70 - 99 mg/dl Final    Performed on 05/20/2021 ACCU-CHEK   Final   No results displayed because visit has over 200 results.       Admission on 02/08/2021, Discharged on 02/08/2021   Component Date Value Ref Range Status    Ventricular Rate 02/08/2021 68  BPM Final    Atrial Rate 02/08/2021 68  BPM Final    P-R Interval 02/08/2021 148  ms Final    QRS Duration 02/08/2021 72  ms Final    Q-T Interval 02/08/2021 408  ms Final    QTc Calculation (Bazett) 02/08/2021 433  ms Final    P Axis 02/08/2021 53  degrees Final    R Axis 02/08/2021 0  degrees Final    T Axis 02/08/2021 38  degrees Final    Diagnosis 02/08/2021 Normal sinus rhythmNormal ECGWhen compared with ECG of 26-JAN-2021 16:29,No significant change was foundConfirmed by Quin Diez ([id number]) on 2/9/2021 2:43:04 PM   Final    WBC 02/08/2021 5.9  4.0 - 11.0 K/uL Final    RBC 02/08/2021 2.88* 4.00 - 5.20 M/uL Final    Hemoglobin 02/08/2021 8.7* 12.0 - 16.0 g/dL Final    Hematocrit 02/08/2021 27.2* 36.0 - 48.0 % Final    MCV 02/08/2021 94.3  80.0 - 100.0 fL Final    MCH 02/08/2021 30.2  26.0 - 34.0 pg Final    MCHC 02/08/2021 32.0  31.0 - 36.0 g/dL Final    RDW 02/08/2021 16.5* 12.4 - 15.4 % Final    Platelets 07/97/3997 245  135 - 450 K/uL Final    MPV 02/08/2021 9.1  5.0 - 10.5 fL Final    Neutrophils % 02/08/2021 75.6  % Final    Lymphocytes % 02/08/2021 16.9  % Final    Monocytes % 02/08/2021 6.6  % Final    Eosinophils % 02/08/2021 0.6  % Final    Basophils % 02/08/2021 0.3  % Final    Neutrophils Absolute 02/08/2021 4.4  1.7 - 7.7 K/uL Final    Lymphocytes Absolute 02/08/2021 1.0  1.0 - 5.1 K/uL Final    Monocytes Absolute 02/08/2021 0.4  0.0 - 1.3 K/uL Final    Eosinophils Absolute 02/08/2021 0.0  0.0 - 0.6 K/uL Final    Basophils Absolute 02/08/2021 0.0  0.0 - 0.2 K/uL Final    Sodium 02/08/2021 139  136 - 145 mmol/L Final    Potassium 02/08/2021 4.3  3.5 - 5.1 mmol/L Final    Chloride 02/08/2021 101  99 - 110 mmol/L Final    CO2 02/08/2021 28  21 - 32 mmol/L Final    Anion Gap 02/08/2021 10  3 - 16 Final    Glucose 02/08/2021 311* 70 - 99 mg/dL Final    BUN 02/08/2021 11  7 - 20 mg/dL Final    CREATININE 02/08/2021 1.1  0.6 - 1.2 mg/dL Final    GFR Non- 02/08/2021 50* >60 Final  GFR  02/08/2021 >60  >60 Final    Calcium 02/08/2021 8.6  8.3 - 10.6 mg/dL Final    Total Protein 02/08/2021 6.8  6.4 - 8.2 g/dL Final    Albumin 02/08/2021 3.4  3.4 - 5.0 g/dL Final    Albumin/Globulin Ratio 02/08/2021 1.0* 1.1 - 2.2 Final    Total Bilirubin 02/08/2021 0.3  0.0 - 1.0 mg/dL Final    Alkaline Phosphatase 02/08/2021 288* 40 - 129 U/L Final    ALT 02/08/2021 55* 10 - 40 U/L Final    AST 02/08/2021 56* 15 - 37 U/L Final    Globulin 02/08/2021 3.4  g/dL Final    Troponin 02/08/2021 <0.01  <0.01 ng/mL Final    Specimen Status 02/08/2021 KIMMY   Final    Lipase 02/08/2021 5.0* 13.0 - 60.0 U/L Final    Troponin 02/08/2021 <0.01  <0.01 ng/mL Final    Ventricular Rate 02/08/2021 56  BPM Final    Atrial Rate 02/08/2021 56  BPM Final    P-R Interval 02/08/2021 164  ms Final    QRS Duration 02/08/2021 68  ms Final    Q-T Interval 02/08/2021 458  ms Final    QTc Calculation (Bazett) 02/08/2021 441  ms Final    P Axis 02/08/2021 62  degrees Final    R Axis 02/08/2021 -13  degrees Final    T Axis 02/08/2021 35  degrees Final    Diagnosis 02/08/2021 Sinus bradycardiaOtherwise normal ECGWhen compared with ECG of 08-FEB-2021 15:11,No significant change was foundConfirmed by Ky Loss ([id number]) on 2/9/2021 2:43:10 PM   Final   No results displayed because visit has over 200 results.       Admission on 01/07/2021, Discharged on 01/07/2021   Component Date Value Ref Range Status    POC Glucose 01/07/2021 >600* 70 - 99 mg/dl Final    Performed on 01/07/2021 ACCU-CHEK   Final    WBC 01/07/2021 5.5  4.0 - 11.0 K/uL Final    RBC 01/07/2021 3.51* 4.00 - 5.20 M/uL Final    Hemoglobin 01/07/2021 9.8* 12.0 - 16.0 g/dL Final    Hematocrit 01/07/2021 32.9* 36.0 - 48.0 % Final    MCV 01/07/2021 93.9  80.0 - 100.0 fL Final    MCH 01/07/2021 28.0  26.0 - 34.0 pg Final    MCHC 01/07/2021 29.8* 31.0 - 36.0 g/dL Final    RDW 01/07/2021 14.8  12.4 - 15.4 % Final    Platelets 26/40/4340 01/07/2021 5.0  5.0 - 8.0 Final    Protein, UA 01/07/2021 Negative  Negative mg/dL Final    Urobilinogen, Urine 01/07/2021 0.2  <2.0 E.U./dL Final    Nitrite, Urine 01/07/2021 Negative  Negative Final    Leukocyte Esterase, Urine 01/07/2021 Negative  Negative Final    Microscopic Examination 01/07/2021 Not Indicated   Final    Urine Type 01/07/2021 NotGiven   Final    Urine Reflex to Culture 01/07/2021 Not Indicated   Final    Magnesium 01/07/2021 2.00  1.80 - 2.40 mg/dL Final    Phosphorus 01/07/2021 3.3  2.5 - 4.9 mg/dL Final    Beta-Hydroxybutyrate 01/07/2021 1.39* 0.00 - 0.27 mmol/L Final    POC Glucose 01/07/2021 394* 70 - 99 mg/dl Final    Performed on 01/07/2021 ACCU-CHEK   Final   Admission on 11/17/2020, Discharged on 11/17/2020   Component Date Value Ref Range Status    POC Glucose 11/17/2020 >600* 70 - 99 mg/dl Final    Performed on 11/17/2020 ACCU-CHEK   Final    WBC 11/17/2020 5.8  4.0 - 11.0 K/uL Final    RBC 11/17/2020 3.03* 4.00 - 5.20 M/uL Final    Hemoglobin 11/17/2020 8.8* 12.0 - 16.0 g/dL Final    Hematocrit 11/17/2020 29.0* 36.0 - 48.0 % Final    MCV 11/17/2020 95.8  80.0 - 100.0 fL Final    MCH 11/17/2020 29.2  26.0 - 34.0 pg Final    MCHC 11/17/2020 30.4* 31.0 - 36.0 g/dL Final    RDW 11/17/2020 14.4  12.4 - 15.4 % Final    Platelets 96/90/9232 258  135 - 450 K/uL Final    MPV 11/17/2020 9.6  5.0 - 10.5 fL Final    Neutrophils % 11/17/2020 72.0  % Final    Lymphocytes % 11/17/2020 22.1  % Final    Monocytes % 11/17/2020 4.7  % Final    Eosinophils % 11/17/2020 0.5  % Final    Basophils % 11/17/2020 0.7  % Final    Neutrophils Absolute 11/17/2020 4.2  1.7 - 7.7 K/uL Final    Lymphocytes Absolute 11/17/2020 1.3  1.0 - 5.1 K/uL Final    Monocytes Absolute 11/17/2020 0.3  0.0 - 1.3 K/uL Final    Eosinophils Absolute 11/17/2020 0.0  0.0 - 0.6 K/uL Final    Basophils Absolute 11/17/2020 0.0  0.0 - 0.2 K/uL Final    Sodium 11/17/2020 129* 136 - 145 mmol/L Final    Potassium 11/17/2020 4.5  3.5 - 5.1 mmol/L Final    Chloride 11/17/2020 95* 99 - 110 mmol/L Final    CO2 11/17/2020 23  21 - 32 mmol/L Final    Anion Gap 11/17/2020 11  3 - 16 Final    Glucose 11/17/2020 700* 70 - 99 mg/dL Final    BUN 11/17/2020 11  7 - 20 mg/dL Final    CREATININE 11/17/2020 1.4* 0.6 - 1.2 mg/dL Final    GFR Non- 11/17/2020 38* >60 Final    GFR  11/17/2020 46* >60 Final    Calcium 11/17/2020 9.0  8.3 - 10.6 mg/dL Final    Total Protein 11/17/2020 7.3  6.4 - 8.2 g/dL Final    Albumin 11/17/2020 3.9  3.4 - 5.0 g/dL Final    Albumin/Globulin Ratio 11/17/2020 1.1  1.1 - 2.2 Final    Total Bilirubin 11/17/2020 <0.2  0.0 - 1.0 mg/dL Final    Alkaline Phosphatase 11/17/2020 314* 40 - 129 U/L Final    ALT 11/17/2020 42* 10 - 40 U/L Final    AST 11/17/2020 54* 15 - 37 U/L Final    Globulin 11/17/2020 3.4  g/dL Final    pH, [facility] 11/17/2020 7.314* 7.350 - 7.450 Final    pCO2, [facility] 11/17/2020 44.5  40.0 - 50.0 mmHg Final    pO2, Kyle 11/17/2020 47.4* 25 - 40 mmHg Final    HCO3, Venous 11/17/2020 22.1* 23.0 - 29.0 mmol/L Final    Base Excess, [facility] 11/17/2020 -3.9* -3.0 - 3.0 mmol/L Final    O2 Sat, Kyle 11/17/2020 82  Not Established % Final    Carboxyhemoglobin 11/17/2020 4.1* 0.0 - 1.5 % Final    MetHgb, Kyle 11/17/2020 0.2  <1.5 % Final    TC02 (Calc), Kyle 11/17/2020 24  Not Established mmol/L Final    O2 Content, Kyle 11/17/2020 11  Not Established VOL % Final    O2 Therapy 11/17/2020 Unknown   Final    Color, UA 11/17/2020 Yellow  Straw/Yellow Final    Clarity, UA 11/17/2020 Clear  Clear Final    Glucose, Ur 11/17/2020 >=1000* Negative mg/dL Final    Bilirubin Urine 11/17/2020 Negative  Negative Final    Ketones, Urine 11/17/2020 Negative  Negative mg/dL Final    Specific [place], UA 11/17/2020 1.010  1.005 - 1.030 Final    Blood, Urine 11/17/2020 Negative  Negative Final    pH, UA 11/17/2020 5.5  5.0 - 8.0 Final    Protein, UA 11/17/2020 Negative  Negative mg/dL Final    Urobilinogen, Urine 11/17/2020 0.2  <2.0 E.U./dL Final    Nitrite, Urine 11/17/2020 Negative  Negative Final    Leukocyte Esterase, Urine 11/17/2020 Negative  Negative Final    Microscopic Examination 11/17/2020 Not Indicated   Final    Urine Type 11/17/2020 NotGiven   Final    Glucose 11/17/2020 342  mg/dL Final    POC Glucose 11/17/2020 440* 70 - 99 mg/dl Final    Performed on 11/17/2020 ACCU-CHEK   Final    POC Glucose 11/17/2020 342* 70 - 99 mg/dl Final    Performed on 11/17/2020 ACCU-CHEK   Final   Admission on 11/11/2020, Discharged on 11/12/2020   Component Date Value Ref Range Status    Rapid Influenza A Ag 11/11/2020 Negative  Negative Final    Rapid Influenza B Ag 11/11/2020 Negative  Negative Final    Rapid Strep A Screen 11/11/2020 Negative  Negative Final    SARS-CoV-2, NAAT 11/11/2020 Not Detected  Not Detected Final    Strep A Culture 11/11/2020 Normal oral katherine, No Beta Strep isolated   Final    Glucose 11/12/2020 162  mg/dL Final    POC Glucose 11/11/2020 434* 70 - 99 mg/dl Final    Performed on 11/11/2020 ACCU-CHEK   Final    WBC 11/11/2020 7.6  4.0 - 11.0 K/uL Final    RBC 11/11/2020 3.65* 4.00 - 5.20 M/uL Final    Hemoglobin 11/11/2020 10.4* 12.0 - 16.0 g/dL Final    Hematocrit 11/11/2020 33.8* 36.0 - 48.0 % Final    MCV 11/11/2020 92.4  80.0 - 100.0 fL Final    MCH 11/11/2020 28.5  26.0 - 34.0 pg Final    MCHC 11/11/2020 30.9* 31.0 - 36.0 g/dL Final    RDW 11/11/2020 13.7  12.4 - 15.4 % Final    Platelets 53/45/2085 220  135 - 450 K/uL Final    MPV 11/11/2020 10.0  5.0 - 10.5 fL Final    Neutrophils % 11/11/2020 68.6  % Final    Lymphocytes % 11/11/2020 22.6  % Final    Monocytes % 11/11/2020 8.0  % Final    Eosinophils % 11/11/2020 0.4  % Final    Basophils % 11/11/2020 0.4  % Final    Neutrophils Absolute 11/11/2020 5.2  1.7 - 7.7 K/uL Final    Lymphocytes Absolute 11/11/2020 1.7  1.0 - 5.1 K/uL Final    Monocytes Absolute 11/11/2020 0.6  0.0 - 1.3 K/uL Final    Eosinophils Absolute 11/11/2020 0.0  0.0 - 0.6 K/uL Final    Basophils Absolute 11/11/2020 0.0  0.0 - 0.2 K/uL Final    Sodium 11/11/2020 134* 136 - 145 mmol/L Final    Potassium reflex Magnesium 11/11/2020 3.9  3.5 - 5.1 mmol/L Final    Chloride 11/11/2020 97* 99 - 110 mmol/L Final    CO2 11/11/2020 22  21 - 32 mmol/L Final    Anion Gap 11/11/2020 15  3 - 16 Final    Glucose 11/11/2020 377* 70 - 99 mg/dL Final    BUN 11/11/2020 16  7 - 20 mg/dL Final    CREATININE 11/11/2020 1.2  0.6 - 1.2 mg/dL Final    GFR Non- 11/11/2020 46* >60 Final    GFR  11/11/2020 55* >60 Final    Calcium 11/11/2020 9.8  8.3 - 10.6 mg/dL Final    Total Protein 11/11/2020 7.9  6.4 - 8.2 g/dL Final    Albumin 11/11/2020 3.3* 3.4 - 5.0 g/dL Final    Albumin/Globulin Ratio 11/11/2020 0.7* 1.1 - 2.2 Final    Total Bilirubin 11/11/2020 0.4  0.0 - 1.0 mg/dL Final    Alkaline Phosphatase 11/11/2020 201* 40 - 129 U/L Final    ALT 11/11/2020 28  10 - 40 U/L Final    AST 11/11/2020 18  15 - 37 U/L Final    Globulin 11/11/2020 4.6  g/dL Final    Color, UA 11/11/2020 Straw  Straw/Yellow Final    Clarity, UA 11/11/2020 SL CLOUDY* Clear Final    Glucose, Ur 11/11/2020 >=1000* Negative mg/dL Final    Bilirubin Urine 11/11/2020 Negative  Negative Final    Ketones, Urine 11/11/2020 TRACE* Negative mg/dL Final    Specific [place], UA 11/11/2020 <=1.005  1.005 - 1.030 Final    Blood, Urine 11/11/2020 Negative  Negative Final    pH, UA 11/11/2020 5.5  5.0 - 8.0 Final    Protein, UA 11/11/2020 Negative  Negative mg/dL Final    Urobilinogen, Urine 11/11/2020 0.2  <2.0 E.U./dL Final    Nitrite, Urine 11/11/2020 POSITIVE* Negative Final    Leukocyte Esterase, Urine 11/11/2020 Negative  Negative Final    Microscopic Examination 11/11/2020 YES   Final    Urine Type 11/11/2020 NotGiven   Final    pH, Kyle 11/11/2020 7.432  7.350 - 7.450 Final    pCO2, Kyle 11/11/2020 35.5* 40.0 - 50.0 mmHg Final    pO2, Kyle 11/11/2020 177.3* 25 - 40 mmHg Final    HCO3, Venous 11/11/2020 23.1  23.0 - 29.0 mmol/L Final    Base Excess, Kyle 11/11/2020 -0.7  -3.0 - 3.0 mmol/L Final    O2 Sat, Kyle 11/11/2020 98  Not Established % Final    Carboxyhemoglobin 11/11/2020 1.6* 0.0 - 1.5 % Final    MetHgb, Kyle 11/11/2020 0.0  <1.5 % Final    TC02 (Calc), Kyle 11/11/2020 24  Not Established mmol/L Final    O2 Content, Kyle 11/11/2020 16  Not Established VOL % Final    O2 Therapy 11/11/2020 Unknown   Final    WBC, UA 11/11/2020 21-50* 0 - 5 /HPF Final    RBC, UA 11/11/2020 None seen  0 - 4 /HPF Final    Epithelial Cells, UA 11/11/2020 2-5  0 - 5 /HPF Final    Bacteria, UA 11/11/2020 2+* None Seen /HPF Final    POC Glucose 11/12/2020 162* 70 - 99 mg/dl Final    Performed on 11/12/2020 ACCU-CHEK   Final   Hospital Outpatient Visit on 11/09/2020   Component Date Value Ref Range Status    CA 19-9 11/09/2020 58* 0 - 35 U/mL Final    CEA 11/09/2020 12.6* 0.0 - 5.0 ng/mL Final    Cortisol - AM 11/09/2020 24.3* 4.3 - 22.4 ug/dL Final    T3, Free 11/09/2020 1.4* 2.3 - 4.2 pg/mL Final    T4 Free 11/09/2020 1.1  0.9 - 1.8 ng/dL Final    TSH 11/09/2020 0.92  0.27 - 4.20 uIU/mL Final   There may be more visits with results that are not included. Assessment and Plan     Sully Woo was seen today for follow-up and diabetes.     Diagnoses and all orders for this visit:    Uncontrolled type 2 diabetes mellitus with microalbuminuria, with long-term current use of insulin ([facility])  -     glimepiride (AMARYL) 2 MG tablet; 2 mg with breakfast or first meal of the day    Type 2 diabetes mellitus with other diabetic kidney complication (HCC)  -     glimepiride (AMARYL) 2 MG tablet; 2 mg with breakfast or first meal of the day    Type 2 diabetes mellitus with diabetic nephropathy, with long-term current use of insulin (HCC)  -     glimepiride (AMARYL) 2 MG tablet; 2 mg with breakfast or first meal of the day    Uncontrolled type 2 diabetes mellitus with diabetic nephropathy, with long-term current use of insulin (HCC)    Essential (primary) hypertension          1: Type 2 DM complicated with nephropathy, neuropathy TIAs, carotid stenosis   Uncontrolled A1C 15.5% Aug 9th 2021 < 15.3% <  9.3% (after transfusion) < 11.9% < 7.3% < 8.4%<  6.8% < 10.1%  << 8.1 <  8% < 11.7%    Cr 1.2, GFR 55 Oct 2020   A1C of <8 would be acceptable because of microvascular and macrovascular complications. Questionable adherence to medical plan, treatment adherence and diet plan   [place] not approved     Need to bring meds on every visit      Blood sugars are variable depending on the intake     Will keep Synjardy and Victoza off due to weight loss     Blood sugars are high in the evening. Add glimepiride 2 mg with breakfast or first meal of the day     Continue Lantus 26 units daily . Adherence addressed. Missing once a week/     Novolog SS                     With meals (during the day)   - at bedtime    < 150 ---     3 units                                 0 units  151-200 --  4 units                                 0 units  201-250 :    6 units                                 1 units  251-300:     7 units                                 2 units  301-350:     8 units                                 3 units  >350 :         9 units                                  4 units    Reiterated importance of taking care meds to control diabetes     All instructions provided in written. Check Blood sugars 4 times per day. Log them along with insulin and send them every 2 weeks. Call for blood sugars less than 60 or more than 400. Eye exam: Last exam in March 12th 2018, Needs an exam now.     Foot exam:  March 2020    Deformity/amputation: absent  Skin lesions/pre-ulcerative calluses: absent  Edema: right- negative, left- negative  Sensory exam: Monofilament sensation: normal  Plus at least one of the following:   Pulses: normal, destination      Electronically signed by Shaina Alan MD on 8/17/2021 at 1:50 PM

## 2021-09-27 ENCOUNTER — HOSPITAL ENCOUNTER (OUTPATIENT)
Age: 61
Setting detail: OBSERVATION
Discharge: HOME | End: 2021-09-29
Attending: EMERGENCY MEDICINE | Admitting: INTERNAL MEDICINE
Payer: MEDICAID

## 2021-09-27 ENCOUNTER — APPOINTMENT (OUTPATIENT)
Dept: GENERAL RADIOLOGY | Age: 61
End: 2021-09-27
Payer: MEDICAID

## 2021-09-27 DIAGNOSIS — Z79.4 CONTROLLED TYPE 2 DIABETES MELLITUS WITH HYPERGLYCEMIA, WITH LONG-TERM CURRENT USE OF INSULIN (HCC): Primary | ICD-10-CM

## 2021-09-27 DIAGNOSIS — R06.09 DOE (DYSPNEA ON EXERTION): ICD-10-CM

## 2021-09-27 DIAGNOSIS — E11.65 CONTROLLED TYPE 2 DIABETES MELLITUS WITH HYPERGLYCEMIA, WITH LONG-TERM CURRENT USE OF INSULIN (HCC): Primary | ICD-10-CM

## 2021-09-27 DIAGNOSIS — R51.9 ACUTE NONINTRACTABLE HEADACHE, UNSPECIFIED HEADACHE TYPE: ICD-10-CM

## 2021-09-27 DIAGNOSIS — R07.9 CHEST PAIN, UNSPECIFIED TYPE: ICD-10-CM

## 2021-09-27 DIAGNOSIS — F43.21 GRIEF: ICD-10-CM

## 2021-09-27 LAB
A/G RATIO: 1.1 (ref 1.1–2.2)
ALBUMIN SERPL-MCNC: 4.3 G/DL (ref 3.4–5)
ALP BLD-CCNC: 166 U/L (ref 40–129)
ALT SERPL-CCNC: 50 U/L (ref 10–40)
ANION GAP SERPL CALCULATED.3IONS-SCNC: 9 MMOL/L (ref 3–16)
AST SERPL-CCNC: 31 U/L (ref 15–37)
BASE EXCESS VENOUS: -1.5 MMOL/L (ref -3–3)
BASOPHILS ABSOLUTE: 0 K/UL (ref 0–0.2)
BASOPHILS RELATIVE PERCENT: 0.6 %
BILIRUB SERPL-MCNC: 0.3 MG/DL (ref 0–1)
BUN BLDV-MCNC: 22 MG/DL (ref 7–20)
CALCIUM SERPL-MCNC: 9.6 MG/DL (ref 8.3–10.6)
CARBOXYHEMOGLOBIN: 4.9 % (ref 0–1.5)
CHLORIDE BLD-SCNC: 97 MMOL/L (ref 99–110)
CO2: 24 MMOL/L (ref 21–32)
CREAT SERPL-MCNC: 1.2 MG/DL (ref 0.6–1.2)
EOSINOPHILS ABSOLUTE: 0 K/UL (ref 0–0.6)
EOSINOPHILS RELATIVE PERCENT: 0.9 %
GFR AFRICAN AMERICAN: 55
GFR NON-AFRICAN AMERICAN: 45
GLOBULIN: 3.8 G/DL
GLUCOSE BLD-MCNC: 451 MG/DL (ref 70–99)
GLUCOSE BLD-MCNC: 452 MG/DL (ref 70–99)
HCO3 VENOUS: 22.1 MMOL/L (ref 23–29)
HCT VFR BLD CALC: 32.2 % (ref 36–48)
HEMOGLOBIN: 10.6 G/DL (ref 12–16)
LYMPHOCYTES ABSOLUTE: 1.8 K/UL (ref 1–5.1)
LYMPHOCYTES RELATIVE PERCENT: 35.7 %
MAGNESIUM: 2.3 MG/DL (ref 1.8–2.4)
MCH RBC QN AUTO: 28.8 PG (ref 26–34)
MCHC RBC AUTO-ENTMCNC: 32.9 G/DL (ref 31–36)
MCV RBC AUTO: 87.4 FL (ref 80–100)
METHEMOGLOBIN VENOUS: 0.6 %
MONOCYTES ABSOLUTE: 0.3 K/UL (ref 0–1.3)
MONOCYTES RELATIVE PERCENT: 5.8 %
NEUTROPHILS ABSOLUTE: 2.9 K/UL (ref 1.7–7.7)
NEUTROPHILS RELATIVE PERCENT: 57 %
O2 SAT, VEN: 99 %
O2 THERAPY: ABNORMAL
PCO2, VEN: 33.7 MMHG (ref 40–50)
PDW BLD-RTO: 13 % (ref 12.4–15.4)
PERFORMED ON: ABNORMAL
PH VENOUS: 7.43 (ref 7.35–7.45)
PLATELET # BLD: 173 K/UL (ref 135–450)
PMV BLD AUTO: 8.8 FL (ref 5–10.5)
PO2, VEN: 177.6 MMHG (ref 25–40)
POTASSIUM SERPL-SCNC: 4.6 MMOL/L (ref 3.5–5.1)
PRO-BNP: 350 PG/ML (ref 0–124)
RBC # BLD: 3.68 M/UL (ref 4–5.2)
SARS-COV-2, NAAT: NOT DETECTED
SODIUM BLD-SCNC: 130 MMOL/L (ref 136–145)
TCO2 CALC VENOUS: 23 MMOL/L
TOTAL PROTEIN: 8.1 G/DL (ref 6.4–8.2)
TROPONIN: <0.01 NG/ML
WBC # BLD: 5.1 K/UL (ref 4–11)

## 2021-09-27 PROCEDURE — 83735 ASSAY OF MAGNESIUM: CPT

## 2021-09-27 PROCEDURE — 93005 ELECTROCARDIOGRAM TRACING: CPT | Performed by: EMERGENCY MEDICINE

## 2021-09-27 PROCEDURE — 85025 COMPLETE CBC W/AUTO DIFF WBC: CPT

## 2021-09-27 PROCEDURE — 81001 URINALYSIS AUTO W/SCOPE: CPT

## 2021-09-27 PROCEDURE — 71045 X-RAY EXAM CHEST 1 VIEW: CPT

## 2021-09-27 PROCEDURE — 87635 SARS-COV-2 COVID-19 AMP PRB: CPT

## 2021-09-27 PROCEDURE — 83880 ASSAY OF NATRIURETIC PEPTIDE: CPT

## 2021-09-27 PROCEDURE — 82803 BLOOD GASES ANY COMBINATION: CPT

## 2021-09-27 PROCEDURE — 84484 ASSAY OF TROPONIN QUANT: CPT

## 2021-09-27 PROCEDURE — 80053 COMPREHEN METABOLIC PANEL: CPT

## 2021-09-27 RX ORDER — 0.9 % SODIUM CHLORIDE 0.9 %
1000 INTRAVENOUS SOLUTION INTRAVENOUS ONCE
Status: COMPLETED | OUTPATIENT
Start: 2021-09-27 | End: 2021-09-28

## 2021-09-27 RX ORDER — DIPHENHYDRAMINE HYDROCHLORIDE 50 MG/ML
12.5 INJECTION INTRAMUSCULAR; INTRAVENOUS ONCE
Status: COMPLETED | OUTPATIENT
Start: 2021-09-28 | End: 2021-09-28

## 2021-09-27 RX ORDER — METOCLOPRAMIDE HYDROCHLORIDE 5 MG/ML
10 INJECTION INTRAMUSCULAR; INTRAVENOUS ONCE
Status: COMPLETED | OUTPATIENT
Start: 2021-09-28 | End: 2021-09-28

## 2021-09-27 ASSESSMENT — PAIN SCALES - GENERAL: PAINLEVEL_OUTOF10: 7

## 2021-09-28 LAB
AMORPHOUS: ABNORMAL /HPF
BACTERIA: ABNORMAL /HPF
BILIRUBIN URINE: NEGATIVE
BLOOD, URINE: ABNORMAL
CLARITY: ABNORMAL
COLOR: ABNORMAL
EKG ATRIAL RATE: 62 BPM
EKG DIAGNOSIS: NORMAL
EKG P AXIS: 57 DEGREES
EKG P-R INTERVAL: 150 MS
EKG Q-T INTERVAL: 418 MS
EKG QRS DURATION: 78 MS
EKG QTC CALCULATION (BAZETT): 424 MS
EKG R AXIS: -17 DEGREES
EKG T AXIS: 39 DEGREES
EKG VENTRICULAR RATE: 62 BPM
EPITHELIAL CELLS, UA: ABNORMAL /HPF (ref 0–5)
GLUCOSE BLD-MCNC: 133 MG/DL (ref 70–99)
GLUCOSE BLD-MCNC: 154 MG/DL (ref 70–99)
GLUCOSE BLD-MCNC: 157 MG/DL (ref 70–99)
GLUCOSE BLD-MCNC: 176 MG/DL (ref 70–99)
GLUCOSE BLD-MCNC: 322 MG/DL (ref 70–99)
GLUCOSE BLD-MCNC: 337 MG/DL (ref 70–99)
GLUCOSE BLD-MCNC: 480 MG/DL (ref 70–99)
GLUCOSE URINE: >=1000 MG/DL
KETONES, URINE: NEGATIVE MG/DL
LACTIC ACID: 1.1 MMOL/L (ref 0.4–2)
LEUKOCYTE ESTERASE, URINE: NEGATIVE
MICROSCOPIC EXAMINATION: YES
NITRITE, URINE: NEGATIVE
OCCULT BLOOD SCREENING: NORMAL
PERFORMED ON: ABNORMAL
PH UA: 5.5 (ref 5–8)
PROTEIN UA: NEGATIVE MG/DL
RBC UA: ABNORMAL /HPF (ref 0–4)
SARS-COV-2, NAAT: NOT DETECTED
SPECIFIC GRAVITY UA: 1.01 (ref 1–1.03)
TROPONIN: <0.01 NG/ML
URINE TYPE: ABNORMAL
UROBILINOGEN, URINE: 0.2 E.U./DL
WBC UA: ABNORMAL /HPF (ref 0–5)

## 2021-09-28 PROCEDURE — G0378 HOSPITAL OBSERVATION PER HR: HCPCS

## 2021-09-28 PROCEDURE — 2580000003 HC RX 258: Performed by: INTERNAL MEDICINE

## 2021-09-28 PROCEDURE — 99245 OFF/OP CONSLTJ NEW/EST HI 55: CPT | Performed by: INTERNAL MEDICINE

## 2021-09-28 PROCEDURE — 87635 SARS-COV-2 COVID-19 AMP PRB: CPT

## 2021-09-28 PROCEDURE — 96361 HYDRATE IV INFUSION ADD-ON: CPT

## 2021-09-28 PROCEDURE — 6370000000 HC RX 637 (ALT 250 FOR IP): Performed by: INTERNAL MEDICINE

## 2021-09-28 PROCEDURE — 96374 THER/PROPH/DIAG INJ IV PUSH: CPT

## 2021-09-28 PROCEDURE — 6370000000 HC RX 637 (ALT 250 FOR IP): Performed by: EMERGENCY MEDICINE

## 2021-09-28 PROCEDURE — 83605 ASSAY OF LACTIC ACID: CPT

## 2021-09-28 PROCEDURE — 99285 EMERGENCY DEPT VISIT HI MDM: CPT

## 2021-09-28 PROCEDURE — 96375 TX/PRO/DX INJ NEW DRUG ADDON: CPT

## 2021-09-28 PROCEDURE — 6360000002 HC RX W HCPCS: Performed by: EMERGENCY MEDICINE

## 2021-09-28 PROCEDURE — 2580000003 HC RX 258: Performed by: EMERGENCY MEDICINE

## 2021-09-28 PROCEDURE — 84484 ASSAY OF TROPONIN QUANT: CPT

## 2021-09-28 PROCEDURE — 82270 OCCULT BLOOD FECES: CPT

## 2021-09-28 PROCEDURE — 93010 ELECTROCARDIOGRAM REPORT: CPT | Performed by: INTERNAL MEDICINE

## 2021-09-28 RX ORDER — NITROGLYCERIN 0.4 MG/1
0.4 TABLET SUBLINGUAL EVERY 5 MIN PRN
Status: DISCONTINUED | OUTPATIENT
Start: 2021-09-28 | End: 2021-09-29 | Stop reason: HOSPADM

## 2021-09-28 RX ORDER — FLUTICASONE PROPIONATE 50 MCG
1 SPRAY, SUSPENSION (ML) NASAL DAILY
Status: DISCONTINUED | OUTPATIENT
Start: 2021-09-28 | End: 2021-09-29 | Stop reason: HOSPADM

## 2021-09-28 RX ORDER — ASPIRIN 81 MG/1
81 TABLET ORAL DAILY
Status: DISCONTINUED | OUTPATIENT
Start: 2021-09-28 | End: 2021-09-29 | Stop reason: HOSPADM

## 2021-09-28 RX ORDER — MAGNESIUM SULFATE IN WATER 40 MG/ML
2000 INJECTION, SOLUTION INTRAVENOUS PRN
Status: DISCONTINUED | OUTPATIENT
Start: 2021-09-28 | End: 2021-09-29 | Stop reason: HOSPADM

## 2021-09-28 RX ORDER — NICOTINE POLACRILEX 4 MG
15 LOZENGE BUCCAL PRN
Status: DISCONTINUED | OUTPATIENT
Start: 2021-09-28 | End: 2021-09-29 | Stop reason: HOSPADM

## 2021-09-28 RX ORDER — DEXTROSE MONOHYDRATE 50 MG/ML
100 INJECTION, SOLUTION INTRAVENOUS PRN
Status: DISCONTINUED | OUTPATIENT
Start: 2021-09-28 | End: 2021-09-29 | Stop reason: HOSPADM

## 2021-09-28 RX ORDER — ATORVASTATIN CALCIUM 10 MG/1
20 TABLET, FILM COATED ORAL DAILY
Status: DISCONTINUED | OUTPATIENT
Start: 2021-09-28 | End: 2021-09-29 | Stop reason: HOSPADM

## 2021-09-28 RX ORDER — CALCIUM CARBONATE-CHOLECALCIFEROL TAB 250 MG-125 UNIT 250-125 MG-UNIT
500 TAB ORAL DAILY
Status: DISCONTINUED | OUTPATIENT
Start: 2021-09-28 | End: 2021-09-29 | Stop reason: HOSPADM

## 2021-09-28 RX ORDER — SODIUM CHLORIDE 0.9 % (FLUSH) 0.9 %
10 SYRINGE (ML) INJECTION PRN
Status: DISCONTINUED | OUTPATIENT
Start: 2021-09-28 | End: 2021-09-29 | Stop reason: HOSPADM

## 2021-09-28 RX ORDER — CLONAZEPAM 0.5 MG/1
0.5 TABLET ORAL 2 TIMES DAILY PRN
Status: DISCONTINUED | OUTPATIENT
Start: 2021-09-28 | End: 2021-09-29 | Stop reason: HOSPADM

## 2021-09-28 RX ORDER — FERROUS SULFATE 325(65) MG
325 TABLET ORAL
Status: DISCONTINUED | OUTPATIENT
Start: 2021-09-29 | End: 2021-09-29 | Stop reason: HOSPADM

## 2021-09-28 RX ORDER — LISINOPRIL 20 MG/1
20 TABLET ORAL DAILY
Status: DISCONTINUED | OUTPATIENT
Start: 2021-09-28 | End: 2021-09-29 | Stop reason: HOSPADM

## 2021-09-28 RX ORDER — LORAZEPAM 0.5 MG/1
0.5 TABLET ORAL ONCE
Status: COMPLETED | OUTPATIENT
Start: 2021-09-28 | End: 2021-09-28

## 2021-09-28 RX ORDER — POTASSIUM CHLORIDE 7.45 MG/ML
10 INJECTION INTRAVENOUS PRN
Status: DISCONTINUED | OUTPATIENT
Start: 2021-09-28 | End: 2021-09-29 | Stop reason: HOSPADM

## 2021-09-28 RX ORDER — ACETAMINOPHEN 650 MG/1
650 SUPPOSITORY RECTAL EVERY 6 HOURS PRN
Status: DISCONTINUED | OUTPATIENT
Start: 2021-09-28 | End: 2021-09-29 | Stop reason: HOSPADM

## 2021-09-28 RX ORDER — PROMETHAZINE HYDROCHLORIDE 25 MG/1
12.5 TABLET ORAL EVERY 6 HOURS PRN
Status: DISCONTINUED | OUTPATIENT
Start: 2021-09-28 | End: 2021-09-29 | Stop reason: HOSPADM

## 2021-09-28 RX ORDER — ACETAMINOPHEN 325 MG/1
650 TABLET ORAL EVERY 6 HOURS PRN
Status: DISCONTINUED | OUTPATIENT
Start: 2021-09-28 | End: 2021-09-29 | Stop reason: HOSPADM

## 2021-09-28 RX ORDER — GABAPENTIN 300 MG/1
600 CAPSULE ORAL 3 TIMES DAILY
Status: DISCONTINUED | OUTPATIENT
Start: 2021-09-28 | End: 2021-09-29 | Stop reason: HOSPADM

## 2021-09-28 RX ORDER — TRAZODONE HYDROCHLORIDE 50 MG/1
100 TABLET ORAL NIGHTLY
Status: DISCONTINUED | OUTPATIENT
Start: 2021-09-28 | End: 2021-09-29 | Stop reason: HOSPADM

## 2021-09-28 RX ORDER — CETIRIZINE HYDROCHLORIDE 10 MG/1
10 TABLET ORAL DAILY
Status: DISCONTINUED | OUTPATIENT
Start: 2021-09-28 | End: 2021-09-29 | Stop reason: HOSPADM

## 2021-09-28 RX ORDER — OXYCODONE AND ACETAMINOPHEN 7.5; 325 MG/1; MG/1
1 TABLET ORAL EVERY 6 HOURS PRN
Status: DISCONTINUED | OUTPATIENT
Start: 2021-09-28 | End: 2021-09-29 | Stop reason: HOSPADM

## 2021-09-28 RX ORDER — SODIUM CHLORIDE 9 MG/ML
25 INJECTION, SOLUTION INTRAVENOUS PRN
Status: DISCONTINUED | OUTPATIENT
Start: 2021-09-28 | End: 2021-09-29 | Stop reason: HOSPADM

## 2021-09-28 RX ORDER — INSULIN GLARGINE 100 [IU]/ML
13 INJECTION, SOLUTION SUBCUTANEOUS EVERY MORNING
Status: DISCONTINUED | OUTPATIENT
Start: 2021-09-28 | End: 2021-09-29 | Stop reason: HOSPADM

## 2021-09-28 RX ORDER — PANTOPRAZOLE SODIUM 40 MG/1
40 TABLET, DELAYED RELEASE ORAL DAILY
Status: DISCONTINUED | OUTPATIENT
Start: 2021-09-28 | End: 2021-09-29 | Stop reason: HOSPADM

## 2021-09-28 RX ORDER — ATORVASTATIN CALCIUM 10 MG/1
20 TABLET, FILM COATED ORAL DAILY
Status: DISCONTINUED | OUTPATIENT
Start: 2021-09-28 | End: 2021-09-28

## 2021-09-28 RX ORDER — ONDANSETRON 2 MG/ML
4 INJECTION INTRAMUSCULAR; INTRAVENOUS EVERY 6 HOURS PRN
Status: DISCONTINUED | OUTPATIENT
Start: 2021-09-28 | End: 2021-09-29 | Stop reason: HOSPADM

## 2021-09-28 RX ORDER — ACETAMINOPHEN 325 MG/1
650 TABLET ORAL ONCE
Status: COMPLETED | OUTPATIENT
Start: 2021-09-28 | End: 2021-09-28

## 2021-09-28 RX ORDER — DIPHENHYDRAMINE HYDROCHLORIDE 50 MG/ML
INJECTION INTRAMUSCULAR; INTRAVENOUS
Status: DISPENSED
Start: 2021-09-28 | End: 2021-09-28

## 2021-09-28 RX ORDER — SODIUM CHLORIDE 0.9 % (FLUSH) 0.9 %
10 SYRINGE (ML) INJECTION EVERY 12 HOURS SCHEDULED
Status: DISCONTINUED | OUTPATIENT
Start: 2021-09-28 | End: 2021-09-29 | Stop reason: HOSPADM

## 2021-09-28 RX ORDER — DEXTROSE MONOHYDRATE 25 G/50ML
12.5 INJECTION, SOLUTION INTRAVENOUS PRN
Status: DISCONTINUED | OUTPATIENT
Start: 2021-09-28 | End: 2021-09-29 | Stop reason: HOSPADM

## 2021-09-28 RX ADMIN — PANTOPRAZOLE SODIUM 40 MG: 40 TABLET, DELAYED RELEASE ORAL at 08:26

## 2021-09-28 RX ADMIN — Medication 10 ML: at 20:42

## 2021-09-28 RX ADMIN — Medication 500 MG: at 09:09

## 2021-09-28 RX ADMIN — INSULIN GLARGINE 13 UNITS: 100 INJECTION, SOLUTION SUBCUTANEOUS at 09:10

## 2021-09-28 RX ADMIN — ACETAMINOPHEN 650 MG: 325 TABLET ORAL at 08:25

## 2021-09-28 RX ADMIN — TICAGRELOR 90 MG: 90 TABLET ORAL at 08:26

## 2021-09-28 RX ADMIN — LISINOPRIL 20 MG: 20 TABLET ORAL at 08:26

## 2021-09-28 RX ADMIN — TRAZODONE HYDROCHLORIDE 100 MG: 50 TABLET ORAL at 20:42

## 2021-09-28 RX ADMIN — ASPIRIN 81 MG: 81 TABLET, COATED ORAL at 08:26

## 2021-09-28 RX ADMIN — ACETAMINOPHEN 650 MG: 325 TABLET ORAL at 00:14

## 2021-09-28 RX ADMIN — Medication 10 ML: at 08:32

## 2021-09-28 RX ADMIN — TICAGRELOR 90 MG: 90 TABLET ORAL at 20:42

## 2021-09-28 RX ADMIN — LORAZEPAM 0.5 MG: 0.5 TABLET ORAL at 00:14

## 2021-09-28 RX ADMIN — INSULIN LISPRO 6 UNITS: 100 INJECTION, SOLUTION INTRAVENOUS; SUBCUTANEOUS at 20:42

## 2021-09-28 RX ADMIN — OXYCODONE AND ACETAMINOPHEN 1 TABLET: 7.5; 325 TABLET ORAL at 17:56

## 2021-09-28 RX ADMIN — GABAPENTIN 600 MG: 300 CAPSULE ORAL at 20:42

## 2021-09-28 RX ADMIN — SODIUM CHLORIDE 1000 ML: 9 INJECTION, SOLUTION INTRAVENOUS at 00:14

## 2021-09-28 RX ADMIN — OXYCODONE AND ACETAMINOPHEN 1 TABLET: 7.5; 325 TABLET ORAL at 23:27

## 2021-09-28 RX ADMIN — METOCLOPRAMIDE HYDROCHLORIDE 10 MG: 5 INJECTION INTRAMUSCULAR; INTRAVENOUS at 00:13

## 2021-09-28 RX ADMIN — FLUTICASONE PROPIONATE 1 SPRAY: 50 SPRAY, METERED NASAL at 17:59

## 2021-09-28 RX ADMIN — INSULIN LISPRO 1 UNITS: 100 INJECTION, SOLUTION INTRAVENOUS; SUBCUTANEOUS at 14:34

## 2021-09-28 RX ADMIN — INSULIN LISPRO 4 UNITS: 100 INJECTION, SOLUTION INTRAVENOUS; SUBCUTANEOUS at 23:27

## 2021-09-28 RX ADMIN — INSULIN HUMAN 10 UNITS: 100 INJECTION, SOLUTION PARENTERAL at 00:19

## 2021-09-28 RX ADMIN — CETIRIZINE HYDROCHLORIDE 10 MG: 10 TABLET, FILM COATED ORAL at 17:59

## 2021-09-28 RX ADMIN — ATORVASTATIN CALCIUM 20 MG: 10 TABLET, FILM COATED ORAL at 08:26

## 2021-09-28 RX ADMIN — DIPHENHYDRAMINE HYDROCHLORIDE 12.5 MG: 50 INJECTION, SOLUTION INTRAMUSCULAR; INTRAVENOUS at 00:13

## 2021-09-28 ASSESSMENT — PAIN SCALES - GENERAL
PAINLEVEL_OUTOF10: 0
PAINLEVEL_OUTOF10: 7
PAINLEVEL_OUTOF10: 9
PAINLEVEL_OUTOF10: 7
PAINLEVEL_OUTOF10: 7
PAINLEVEL_OUTOF10: 6

## 2021-09-28 ASSESSMENT — PAIN DESCRIPTION - LOCATION: LOCATION: BACK

## 2021-09-28 ASSESSMENT — PAIN DESCRIPTION - ORIENTATION: ORIENTATION: LOWER

## 2021-09-28 ASSESSMENT — PAIN DESCRIPTION - PAIN TYPE: TYPE: CHRONIC PAIN

## 2021-09-28 ASSESSMENT — PAIN DESCRIPTION - DESCRIPTORS: DESCRIPTORS: ACHING

## 2021-09-28 NOTE — H&P
Hospital Medicine History & Physical      PCP: Hugo Jacobo    Date of Admission: 9/27/2021    Chief Complaint:  Chest pain    History Of Present Illness:  Patient is a 65-year-old female with past medical history of CAD, diabetes mellitus who presented to hospital for chest pain. According to patient her pain is located in the center of her chest, she did not notice any worsening or relieving factors. It is randomly associated with shortness of breath, denied associated nausea or vomiting. Patient mention she has been compliant with her medications, she follows up with cardiology at [facility].  Otherwise denied fevers chills nausea vomiting diarrhea constipation dysuria      Past Medical History:          Diagnosis Date    Abnormal brain MRI 7/20/2017    Partially empty sella and minimal chronic small vessel ischemic disease    Acute bilateral low back pain without sciatica 11/2/2016    SHARRON (acute kidney injury) ([address] 7/5/2017    Arthritis     back    Bipolar disorder ([address] 10/18/2008    CAD (coronary artery disease)     stent placed 6/8/20    Cancer ([address] 2015    bilateral breast:s/p lumpectomy/radiation:under care care of breast specialist:Dr. Boone     Carotid stenosis, bilateral:<50%:per US 7/2016 7/15/2016    Carpal tunnel syndrome 10/18/2008    Cervical cancer screening 2014    Nml per pt'.     Coronary artery disease of native artery of native heart with stable angina pectoris ([address] 6/9/2020    DDD (degenerative disc disease), lumbar 7/18/2018    Depression     under care of pschiatrist:Dr. 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  Scoliosis     Spondylosis of lumbar region without myelopathy or radiculopathy 3/10/2017    Transient cerebral ischemia 07/15/2016    TIA:7/10/16    Unspecified cerebral artery occlusion with cerebral infarction     TIA       Past Surgical History:          Procedure Laterality Date    BREAST LUMPECTOMY  2015    Bilateral:breast cancer    CARDIAC CATHETERIZATION  06/08/2020    Dr. Eloisa Mcdonald), DAYANA to Diag 1    COLONOSCOPY N/A 2/1/2021    COLONOSCOPY DIAGNOSTIC performed by uLisa White MD at [place] [id number]  2/3/2021    CT BONE MARROW BIOPSY 2/3/2021 Bella Dan MD [id number] [facility] CT SCAN    HYSTERECTOMY      Benign:no cervical cancer per pt'    KIDNEY REMOVAL      right    OTHER SURGICAL HISTORY Right     orif right ankle    TEMPORAL ARTERY BIOPSY Right 8/9/2021    RIGHT TEMPORAL ARTERY BIOPSY LIGATION performed by Mayela Batista MD at [address] N/A 1/29/2021    EGD BIOPSY performed by Luisa White MD at [address]       Medications Prior to Admission:      Prior to Admission medications    Medication Sig Start Date End Date Taking?  Authorizing Provider   empagliflozin (JARDIANCE) 25 MG tablet Take 25 mg by mouth daily    Historical Provider, MD   simvastatin (ZOCOR) 20 MG tablet Take 20 mg by mouth nightly    Historical Provider, MD   insulin lispro, 1 Unit Dial, (HUMALOG KWIKPEN) 100 UNIT/ML SOPN Inject into the skin 3 times daily    Historical Provider, MD   glimepiride (AMARYL) 2 MG tablet 2 mg with breakfast or first meal of the day 8/17/21   Dominique Carrington MD   fluticasone [facility] [place] [place]) 50 MCG/ACT nasal spray 1 spray by Each Nostril route daily 8/12/21   Darlyn Quiroz MD   cetirizine 911. 7/11/20   Philip Wilkins MD   calcium carbonate-vitamin D (CALTRATE) 600-400 MG-UNIT TABS per tab Take 1 tablet by mouth daily  12/30/19   Historical Provider, MD   aspirin 81 MG tablet Take 81 mg by mouth daily    Historical Provider, MD   traZODone (DESYREL) 100 MG tablet Take 100 mg by mouth nightly    Historical Provider, MD       Allergies:  Morphine, Penicillins, Codeine, and Penicillin g    Social History:      TOBACCO:   reports that she quit smoking about 7 years ago. Her smoking use included cigarettes and cigars. She has a 10.00 pack-year smoking history. She has never used smokeless tobacco.  ETOH:   reports no history of alcohol use. Family History:       Reviewed in detail and non contributory          Problem Relation Age of Onset    Cancer Mother         breast    Cancer Father     Heart Failure Neg Hx     High Cholesterol Neg Hx     Hypertension Neg Hx     Migraines Neg Hx     Rashes/Skin Problems Neg Hx     Seizures Neg Hx     Stroke Neg Hx     Thyroid Disease Neg Hx     Diabetes Neg Hx        REVIEW OF SYSTEMS:   Pertinent positives as noted in the HPI. All other systems reviewed and negative. PHYSICAL EXAM PERFORMED:    BP (!) 144/81   Pulse 68   Temp 99.7 °F (37.6 °C) (Oral)   Resp 18   Ht 5' 3\" (1.6 m)   Wt 110 lb 10.7 oz (50.2 kg)   SpO2 100%   BMI 19.60 kg/m²     General appearance:  No apparent distress, cooperative. HEENT:  Normal cephalic, atraumatic without obvious deformity. Conjunctivae/corneas clear. Neck: Supple, with full range of motion. No cervical lymphadenopathy  Respiratory:  Normal respiratory effort. Clear to auscultation, bilaterally without Rales/Wheezes/Rhonchi. Cardiovascular:  Regular rate and rhythm with normal S1/S2 without murmurs, rubs or gallops. Abdomen: Soft, non-tender, non-distended, normal bowel sounds. Musculoskeletal:  No edema noted bilaterally.  No tenderness on palpation   Skin: no rash visible  Neurologic: Neurologically intact without any focal sensory/motor deficits. grossly non-focal.  Psychiatric:  Alert and oriented, normal mood  Peripheral Pulses: +2 palpable, equal bilaterally       Labs:     Recent Labs     09/27/21 1916   WBC 5.1   HGB 10.6*   HCT 32.2*        Recent Labs     09/27/21 1916   *   K 4.6   CL 97*   CO2 24   BUN 22*   CREATININE 1.2   CALCIUM 9.6     Recent Labs     09/27/21 1916   AST 31   ALT 50*   BILITOT 0.3   ALKPHOS 166*     No results for input(s): INR in the last 72 hours. Recent Labs     09/27/21 1916 09/28/21  0025   TROPONINI <0.01 <0.01       Urinalysis:      Lab Results   Component Value Date    NITRU Negative 09/27/2021    WBCUA 6-9 09/27/2021    BACTERIA Rare 09/27/2021    RBCUA 3-4 09/27/2021    BLOODU TRACE-INTACT 09/27/2021    SPECGRAV 1.010 09/27/2021    GLUCOSEU >=1000 09/27/2021    GLUCOSEU >=1000 mg/dL 06/07/2010       Radiology:       XR CHEST PORTABLE   Final Result   No acute cardiopulmonary disease. Mild right basilar atelectasis. Active Hospital Problems    Diagnosis Date Noted    Chest pain [R07.9] 06/05/2020   Patient is a 69-year-old female with past medical history of CAD, diabetes mellitus who presented to hospital for chest pain. According to patient her pain is located in the center of her chest, she did not notice any worsening or relieving factors. It is randomly associated with shortness of breath, denied associated nausea or vomiting.   Patient mention she has been compliant with her medications, she follows up with cardiology at [facility].  Otherwise denied fevers chills nausea vomiting diarrhea constipation dysuria    Chest pain, r/o acs  CAD  DM  HTN  HLD  CKD3    Plan  Plan for cardiac cath per cardiology, continue aspirin, statin, ticagrelor, monitor on tele, monitor troponin  Insulin sliding scale  Resume home medications  DVT prophylaxis-Lovenox  Diet: Diet NPO Exceptions are: Ice Chips, Sips of Water with Meds  ADULT DIET; Regular; 4 carb choices (60 gm/meal); Low Fat/Low Chol/High Fiber/FAUZIA  Diet NPO Exceptions are: Sips of Water with Meds  Code Status: Full Code    PT/OT Eval Status: ordered    Dispo - pending clinical improvement       Kera Last MD    The note was completed using EMR and Dragon dictation system. Every effort was made to ensure accuracy; however, inadvertent computerized transcription errors may be present. Thank you Ankush Mcclellan for the opportunity to be involved in this patient's care. If you have any questions or concerns please feel free to contact me at [phone].     Kera Last MD

## 2021-09-28 NOTE — CONSULTS
[address] [facility]  [phone]      Attending Physician: Sylvester De Oliveira DO  Reason for Consultation/Chief Complaint: Chest pain, headache    Subjective   History of Present Illness:  Colleen Roach is a 57 y.o. patient who presented to the hospital with complaints of chest pain and headache over the last few days. She says she has been under quite a bit of stress as she had a sister who recently passed away from a heart attack. She presented to the emergency room yesterday was admitted due to concerns for uncontrolled hypertension as well as angina, troponin levels been found to be negative. Nuclear stress test have been ordered however this was canceled as she recently had a stress test in May 2021, and that was noted be abnormal but she was felt to be able to be treated medically at that time. She states she is compliant with her medications. She gets her usual cardiac care through [facility], her cardiologist there is Dr. Adelina Garrison. Her cardiac history dates back to 2010, she was evaluated in the hospital for syncope, she had an echocardiogram which showed mild diffuse hypokinesis with ejection fraction of 45 to 50%. At that time, plans were made for outpatient follow-up with heart monitor. She has continued to follow-up with doctors at [facility], her last visit at [place] [address] being on September 13, 2019, at that time she was felt to be stable with regard to chronic conditions which included hypertension, cardiomyopathy, history of syncope and TIA. She did ultimately transition to our practice in 2020, last saw our nurse practitioner after her cardiac catheterization was performed with diagonal branch PCI in 2020 with Dr. Mayank Haider. Patient chronically also states she has diabetes, follows with endocrinology, she says her blood sugar is not well controlled.     Past Medical History:   has a past medical history of Abnormal brain MRI, Acute bilateral low back pain without sciatica, SHARRON (acute kidney injury) ([facility] Arthritis, Bipolar disorder ([facility] CAD (coronary artery disease), Cancer ([facility] Carotid stenosis, bilateral:<50%:per US 7/2016, Carpal tunnel syndrome, Cervical cancer screening, Coronary artery disease of native artery of native heart with stable angina pectoris ([facility] DDD (degenerative disc disease), lumbar, Depression, Depression/anxiety, Depression/anxiety, Diabetes mellitus (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 Microalbuminuria, Neuropathy in diabetes ([facility] Non morbid obesity, Pancreatitis, S/P endoscopy, Scoliosis, Spondylosis of lumbar region without myelopathy or radiculopathy, Transient cerebral ischemia, and Unspecified cerebral artery occlusion with cerebral infarction. Surgical History:   has a past surgical history that includes Kidney removal; Hysterectomy; Breast lumpectomy (2015); Tubal ligation; other surgical history (Right); Cardiac catheterization (06/08/2020); Upper gastrointestinal endoscopy (N/A, 1/29/2021); Colonoscopy (N/A, 2/1/2021); CT BIOPSY BONE MARROW (2/3/2021); and Temporal Artery Biopsy (Right, 8/9/2021). Social History:   reports that she quit smoking about 7 years ago. Her smoking use included cigarettes and cigars. She has a 10.00 pack-year smoking history. She has never used smokeless tobacco. She reports that she does not drink alcohol and does not use drugs. Family History:  family history includes Cancer in her father and mother. Home Medications:  Were reviewed and are listed in nursing record and/or below  Prior to Admission medications    Medication Sig Start Date End Date Taking?  Authorizing Provider   empagliflozin (JARDIANCE) 25 MG tablet Take 25 mg by mouth daily    Historical Provider, MD   simvastatin (ZOCOR) 20 MG tablet Take 20 mg by mouth nightly Historical Provider, MD   insulin lispro, 1 Unit Dial, (HUMALOG KWIKPEN) 100 UNIT/ML SOPN Inject into the skin 3 times daily    Historical Provider, MD   glimepiride (AMARYL) 2 MG tablet 2 mg with breakfast or first meal of the day 8/17/21   Bartolo Varela MD   fluticasone Gely Contras) 50 MCG/ACT nasal spray 1 spray by Each Nostril route daily 8/12/21   Anali Wasserman MD   cetirizine (ZYRTEC) 10 MG tablet Take 1 tablet by mouth daily 8/11/21   Anali Wasserman MD   insulin glargine (LANTUS SOLOSTAR) 100 UNIT/ML injection pen Inject 35 Units into the skin nightly  Patient taking differently: Inject 26 Units into the skin every morning  8/11/21   Anali Wasserman MD   TRUE METRIX BLOOD GLUCOSE TEST strip USE AS DIRECTED TO TEST 4 TIMES A DAY 7/19/21   Bartolo Varela MD   VICTOZA 18 MG/3ML SOPN SC injection INJECT 1.8 MG UNDER THE SKIN ONCE DAILY 6/8/21   Historical Provider, MD   Blood Glucose Monitoring Suppl (TRUE METRIX METER) w/Device KIT Used to  check blood sugar 4 times eyad 6/9/21   Bartolo Varela MD   gabapentin (NEURONTIN) 600 MG tablet Take 0.5 tablets by mouth 2 times daily for 3 days. Patient taking differently: Take 600 mg by mouth 3 times daily.   6/4/21 8/8/21  Cecilio Morataya MD   lisinopril (PRINIVIL;ZESTRIL) 40 MG tablet Take 0.5 tablets by mouth daily  Patient taking differently: Take 40 mg by mouth daily  6/4/21   Cecilio Morataya MD   atorvastatin (LIPITOR) 20 MG tablet Take 1 tablet by mouth daily 5/20/21   Mayank Boogie MD   paliperidone (INVEGA) 9 MG extended release tablet Take 9 mg by mouth every morning  3/15/21   Historical Provider, MD   pantoprazole (PROTONIX) 40 MG tablet Take 40 mg by mouth daily  4/3/21   Historical Provider, MD   ferrous sulfate (IRON 325) 325 (65 Fe) MG tablet Take 325 mg by mouth daily (with breakfast)    Historical Provider, MD   oxyCODONE-acetaminophen (PERCOCET) 7.5-325 MG per tablet TAKE 1 TABLET BY MOUTH EVERY 6 (SIX) HOURS AS NEEDED FOR UP TO 28 DAYS. 3/11/21   Historical Provider, MD   clonazePAM (KLONOPIN) 0.5 MG tablet Take 0.5 mg by mouth 3 times daily as needed. 3/15/21   Historical Provider, MD   ticagrelor (BRILINTA) 90 MG TABS tablet Take 1 tablet by mouth 2 times daily 2/3/21   David Vickers MD   nitroGLYCERIN (NITROSTAT) 0.4 MG SL tablet up to max of 3 total doses.  If no relief after 1 dose, call 911. 7/11/20   Pb Dudley MD   calcium carbonate-vitamin D (CALTRATE) 600-400 MG-UNIT TABS per tab Take 1 tablet by mouth daily  12/30/19   Historical Provider, MD   aspirin 81 MG tablet Take 81 mg by mouth daily    Historical Provider, MD   traZODone (DESYREL) 100 MG tablet Take 100 mg by mouth nightly    Historical Provider, MD        CURRENT Medications:  insulin lispro (HUMALOG) injection vial 0-6 Units, Q4H  glucose (GLUTOSE) 40 % oral gel 15 g, PRN  dextrose 50 % IV solution, PRN  glucagon (rDNA) injection 1 mg, PRN  dextrose 5 % solution, PRN  sodium chloride flush 0.9 % injection 10 mL, 2 times per day  sodium chloride flush 0.9 % injection 10 mL, PRN  0.9 % sodium chloride infusion, PRN  potassium chloride 10 mEq/100 mL IVPB (Peripheral Line), PRN  magnesium sulfate 2000 mg in 50 mL IVPB premix, PRN  promethazine (PHENERGAN) tablet 12.5 mg, Q6H PRN   Or  ondansetron (ZOFRAN) injection 4 mg, Q6H PRN  acetaminophen (TYLENOL) tablet 650 mg, Q6H PRN   Or  acetaminophen (TYLENOL) suppository 650 mg, Q6H PRN  atorvastatin (LIPITOR) tablet 20 mg, Daily  aspirin EC tablet 81 mg, Daily  Calcium Carb-Cholecalciferol 250-125 MG-UNIT TABS 500 mg, Daily  clonazePAM (KLONOPIN) tablet 0.5 mg, BID PRN  insulin glargine (LANTUS) injection vial 13 Units, QAM  lisinopril (PRINIVIL;ZESTRIL) tablet 20 mg, Daily  pantoprazole (PROTONIX) tablet 40 mg, Daily  ticagrelor (BRILINTA) tablet 90 mg, BID  traZODone (DESYREL) tablet 100 mg, Nightly        Allergies:  Morphine, Penicillins, Codeine, and Penicillin g     Review of Systems:   A 14 point review of symptoms completed. Pertinent positives identified in the HPI, all other review of symptoms negative as below.       Objective   PHYSICAL EXAM:    Vitals:    09/28/21 1408   BP: (!) 144/81   Pulse: 68   Resp: 18   Temp: 99.7 °F (37.6 °C)   SpO2: 100%    Weight: 110 lb 10.7 oz (50.2 kg)         General Appearance:  Alert, cooperative, no distress, appears stated age, sitting in chair   Head:  Normocephalic, without obvious abnormality, atraumatic   Eyes:  PERRL, conjunctiva/corneas clear   Nose: Nares normal, no drainage or sinus tenderness   Throat: Lips, mucosa, and tongue normal   Neck: Supple, symmetrical, trachea midline, no adenopathy, thyroid: not enlarged, symmetric, no tenderness/mass/nodules, no carotid bruit or JVD   Lungs:   Clear to auscultation bilaterally, respirations unlabored   Chest Wall:  + tenderness   Heart:  Regular rate and rhythm, S1, S2 normal, 2 out of 6 systolic murmur, positive S4   Abdomen:   Soft, non-tender, bowel sounds active all four quadrants,  no masses, no organomegaly   Extremities: Extremities normal, atraumatic, no cyanosis or edema   Pulses: 2+ and symmetric   Skin: Skin color, texture, turgor normal, no rashes or lesions   Pysch: Normal mood and affect   Neurologic: Normal gross motor and sensory exam.         Labs   CBC:   Lab Results   Component Value Date    WBC 5.1 09/27/2021    RBC 3.68 09/27/2021    HGB 10.6 09/27/2021    HCT 32.2 09/27/2021    MCV 87.4 09/27/2021    RDW 13.0 09/27/2021     09/27/2021     CMP:  Lab Results   Component Value Date     09/27/2021    K 4.6 09/27/2021    K 3.4 06/04/2021    CL 97 09/27/2021    CO2 24 09/27/2021    BUN 22 09/27/2021    CREATININE 1.2 09/27/2021    GFRAA 55 09/27/2021    GFRAA >60 05/29/2013    AGRATIO 1.1 09/27/2021    LABGLOM 45 09/27/2021    GLUCOSE 451 09/27/2021    PROT 8.1 09/27/2021    PROT 8.1 01/05/2013    CALCIUM 9.6 09/27/2021    BILITOT 0.3 09/27/2021    ALKPHOS 166 09/27/2021    AST 31 09/27/2021    ALT 50 09/27/2021     PT/INR:  No results found for: PTINR  HgBA1c:  Lab Results   Component Value Date    LABA1C 15.5 08/09/2021     Lab Results   Component Value Date    CKTOTAL 120 06/03/2021    TROPONINI <0.01 09/28/2021         Cardiac Data     Last EKG: Normal sinus rhythm, normal EKG    Echo:    5/2021    Summary   The left ventricular systolic function is low normal with a visually   estimated ejection fraction of 50-55%. No obvious regional wall motion abnormalities are seen. Normal left ventricular size with mild concentric left ventricular   hypertrophy. Grade I diastolic dysfunction with normal filling pressure. The right ventricle is normal in size and function. Mild mitral and pulmonic regurgitation. Stress Test:    5/2021       [facility] severity, fixed, mid to apical anterior perfusion defect with    reduced myocardial wall motion/thickening suggestive of prior myocardial    infarction. No inducible ischemia. Normal LV size by volumes. Post-stress LV    function is reduced at 36% with serafin-apical hypokinesis/possible apical    dyskinesis. Abnormal high risk study. Suggest echocardiogram to corroborate    findings.             Cath:    Studies:     cxr       Impression   No acute cardiopulmonary disease.  Mild right basilar atelectasis.               I have reviewed labs and imaging/xray/diagnostic testing in this note. Assessment and Plan        Patient Active Problem List   Diagnosis    Acute hyperglycemia    Essential hypertension    Bilateral malignant neoplasm of breast in female ([address]    Microalbuminuria    Obesity (BMI 30-39. 9)    Slurred speech    Hx of ischemic CVA    Brain metastases ([address]    Carotid stenosis, bilateral:<50%:per US 7/2016    SHARRON (acute kidney injury) ([address]    Lactic acidosis    Frequent falls    Depression/anxiety    Abnormal brain MRI    Acute bilateral low back pain without sciatica    Uncontrolled type 2 diabetes mellitus with microalbuminuria, with long-term current use of insulin (HCC)    Closed fracture of right ankle, with routine healing, subsequent encounter    Stage 3b chronic kidney disease ([address]    Uncontrolled type 2 diabetes mellitus with diabetic nephropathy, with long-term current use of insulin (HCC)    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 ([address]    Dyslipidemia associated with type 2 diabetes mellitus ([address]    Bipolar disorder ([facility]    Cardiomegaly    Cardiomyopathy ([facility]    Carpal tunnel syndrome    Chronic systolic heart failure (HCC)    Diffuse cystic mastopathy    Edema    Gallstone pancreatitis    Gout    History of bilateral breast cancer    History of renal cell carcinoma    Cardiomyopathy in other diseases classified elsewhere    Mononeuritis    Myalgia and myositis    Nonspecific abnormal electrocardiogram (ECG) (EKG)    Patient in clinical research study    Peroneal muscular atrophy    Scoliosis (and kyphoscoliosis), idiopathic    SOBOE (shortness of breath on exertion)    Syncope and collapse    Chronic pain disorder    DDD (degenerative disc disease), lumbar    Diabetic polyneuropathy associated with type 2 diabetes mellitus (HCC)    Other secondary scoliosis, lumbosacral region    Thoracic spondylosis without myelopathy    Morbid obesity due to excess calories (HCC)    Age-related nuclear cataract of both eyes    Hypermetropia, bilateral    Hypertensive retinopathy, bilateral    Vitreous degeneration, bilateral    Acute bilateral low back pain with left-sided sciatica    History of CVA (cerebrovascular accident) without residual deficits    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 stage 3 chronic kidney disease (HCC)    S/P mastectomy, right    Acute cystitis without hematuria    Hypomagnesemia    Chest pain    CAD S/P percutaneous coronary angioplasty    DKA (diabetic ketoacidoses) ([facility]  Type 2 diabetes mellitus with hyperglycemia, with long-term current use of insulin (HCC)    Hx of heart artery stent    Nuclear senile cataract    Unintentional weight loss    Anemia    Facial abscess    Asymptomatic bacteriuria    Acute encephalopathy    Tobacco use disorder    Severe malnutrition (HCC)    Acute right eye pain       Chest pain, options discussed with patient including follow-up stress testing versus chronic catheterization, I recommend cardiac catheterization given recent stress testing which was abnormal.  We will plan on performing that tomorrow, patient understands risk/benefit/alternative/outcomes. Obtain follow-up limited echocardiogram for LV function. CAD/ASHD, continue dual antiplatelet therapy    Hypertension, continue lisinopril, no beta-blocker due to bradycardia  Hypercholesterolemia, continue statin    Diabetes, as per primary service    DISCUSSION OF CARDIAC CATHETERIZATION PROCEDURES: The procedures, indications, risks and alternatives have been discussed with the patient and, as appropriate, with the patient's guardian . Risks discussed included, but are not limited to, bleeding, development of blood clots/emboli, damage to blood vessels, renal failure, malignant cardiac arrhythmias, stroke, heart attack, emergent coronary bypass surgery, death, dye allergy. The patient (and guardian as appropriate) expressed understanding of the aforementioned and wished to proceed. Thank you for allowing us to participate in the care of Pillo Saunders. Please call me with any questions [phone].     Monty Goddard MD, [facility] [place]   Interventional Cardiologist  [facility]  [phone] [facility]  [phone] [address] [place] [address]  9/28/2021 2:38 PM

## 2021-09-28 NOTE — CARE COORDINATION
CASE MANAGEMENT INITIAL ASSESSMENT      Reviewed chart and completed assessment patient  Explained Case Management role/services. yes    Primary contact information:reuben Richards    Health Care Decision Maker :   Primary Decision Maker: Ling Ordaz - Other - [phone]    Secondary Decision Maker (Active): Evi Ambriz - [phone]    Supplemental (Other) Decision Maker: Damaris Cunningham - Other - [phone]    Supplemental (Other) Decision Maker: Marleni Jaimes - Other - [phone]          Can this person be reached and be able to respond quickly, such as within a few minutes or hours? Yes      Admit date/status:9/27/21  Diagnosis:hyperglycemia   Is this a Readmission?:  No      Insurance:[employer] required for SNF: waived due to COVID       3 night stay required: No    Living arrangements, Adls, care needs, prior to admission:home alone in 2nd floor apartment    Transportation:car     Durable Medical Equipment at home:  Walker_x_Cane_x_RTS__ BSC__Shower Chair__  02__ HHN__ CPAP__  BiPap__  Hospital Bed__ W/C_x__ Other__________    Services in the home and/or outpatient, prior to admission:none    Barriers to discharge:none noted    Plan/comments:Referred to patient for d/c needs. Spoke to patient. Patient is a 57year old female admitted for hyperglycemia. Patient usually lives at home alone. Patient reports she is independent in ADLs. Patient has a walker, cane and w/c at home. Patient denies d/c needs at this time.        ECOC on chart for MD signature    Electronically signed by MAYELA Gutierrez LISW-S on 9/28/2021 at 10:03 AM

## 2021-09-28 NOTE — ED PROVIDER NOTES
CHIEF COMPLAINT  Hyperglycemia (states BG has been in 400s for two days, denies any other complaint at this time. )      HISTORY OF PRESENT ILLNESS  Sharon Terrazas is a 57 y.o. female with a history of hypertension, breast cancer, diabetes, CHF, dyslipidemia, CAD with stent, anxiety/depression who presents to the ED complaining of hyperglycemia. Patient states for the past few days her blood sugars have been running in the 400s. Measured at 452 upon arrival in the ER. Patient states she has been somewhat anxious lately as one of her family members is on life support. She reports some dyspnea especially with ambulation for the past 48 hours. Denies cough, fever, chills, chest pain, diaphoresis, syncope, nausea, vomiting, diarrhea. No other complaints, modifying factors or associated symptoms. I have reviewed the following from the nursing documentation. Past Medical History:   Diagnosis Date    Abnormal brain MRI 7/20/2017    Partially empty sella and minimal chronic small vessel ischemic disease    Acute bilateral low back pain without sciatica 11/2/2016    SHARRON (acute kidney injury) ([address] 7/5/2017    Arthritis     back    Bipolar disorder ([address] 10/18/2008    CAD (coronary artery disease)     stent placed 6/8/20    Cancer [facility]) 2015    bilateral breast:s/p lumpectomy/radiation:under care care of breast specialist:Dr. Boone     Carotid stenosis, bilateral:<50%:per US 7/2016 7/15/2016    Carpal tunnel syndrome 10/18/2008    Cervical cancer screening 2014    Nml per pt'.     Coronary artery disease of native artery of native heart with stable angina pectoris ([address] 6/9/2020    DDD (degenerative disc disease), lumbar 7/18/2018    Depression     under care of pschiatrist:Dr. Diane Bhatia    Depression/anxiety 7/5/2017    Depression/anxiety     Diabetes mellitus ([address]     Gout     History of mammogram 10/28/2016;8/14/17    Negative    Hyperlipidemia     Hypertension     Hypertensive heart and Occupational History    Occupation:    Tobacco Use    Smoking status: Former Smoker     Packs/day: 0.50     Years: 20.00     Pack years: 10.00     Types: Cigarettes, Cigars     Quit date: 7/3/2014     Years since quittin.2    Smokeless tobacco: Never Used   Vaping Use    Vaping Use: Never used   Substance and Sexual Activity    Alcohol use: No     Alcohol/week: 0.0 standard drinks    Drug use: No    Sexual activity: Never   Other Topics Concern    Not on file   Social History Narrative    Not on file     Social Determinants of Health     Financial Resource Strain:     Difficulty of Paying Living Expenses:    Food Insecurity:     Worried About Running Out of Food in the Last Year:     [address] in the Last Year:    Transportation Needs:     Lack of Transportation (Medical):      Lack of Transportation (Non-Medical):    Physical Activity:     Days of Exercise per Week:     Minutes of Exercise per Session:    Stress:     Feeling of Stress :    Social Connections:     Frequency of Communication with Friends and Family:     Frequency of Social Gatherings with Friends and Family:     Attends Rastafarian Services:     Active Member of Clubs or Organizations:     Attends Club or Organization Meetings:     Marital Status:    Intimate Partner Violence:     Fear of Current or Ex-Partner:     Emotionally Abused:     Physically Abused:     Sexually Abused:      Current Facility-Administered Medications   Medication Dose Route Frequency Provider Last Rate Last Admin    0.9 % sodium chloride bolus  1,000 mL IntraVENous Once Manny Small MD        insulin regular (HUMULIN R;NOVOLIN R) injection 10 Units  10 Units IntraVENous Once Manny Small MD         Current Outpatient Medications   Medication Sig Dispense Refill    empagliflozin (JARDIANCE) 25 MG tablet Take 25 mg by mouth daily      simvastatin (ZOCOR) 20 MG tablet Take 20 mg by mouth nightly      insulin lispro, 1 Unit Dial, (HUMALOG KWIKPEN) 100 UNIT/ML SOPN Inject into the skin 3 times daily      glimepiride (AMARYL) 2 MG tablet 2 mg with breakfast or first meal of the day 30 tablet 5    fluticasone (FLONASE) 50 MCG/ACT nasal spray 1 spray by Each Nostril route daily 1 Bottle 1    cetirizine (ZYRTEC) 10 MG tablet Take 1 tablet by mouth daily 30 tablet 1    insulin glargine (LANTUS SOLOSTAR) 100 UNIT/ML injection pen Inject 35 Units into the skin nightly (Patient taking differently: Inject 26 Units into the skin every morning ) 1 pen 1    TRUE METRIX BLOOD GLUCOSE TEST strip USE AS DIRECTED TO TEST 4 TIMES A  strip 5    VICTOZA 18 MG/3ML SOPN SC injection INJECT 1.8 MG UNDER THE SKIN ONCE DAILY      Blood Glucose Monitoring Suppl (TRUE METRIX METER) w/Device KIT Used to  check blood sugar 4 times eyad 1 kit 1    gabapentin (NEURONTIN) 600 MG tablet Take 0.5 tablets by mouth 2 times daily for 3 days. (Patient taking differently: Take 600 mg by mouth 3 times daily. ) 90 tablet 3    lisinopril (PRINIVIL;ZESTRIL) 40 MG tablet Take 0.5 tablets by mouth daily (Patient taking differently: Take 40 mg by mouth daily ) 90 tablet 1    atorvastatin (LIPITOR) 20 MG tablet Take 1 tablet by mouth daily 30 tablet 5    paliperidone (INVEGA) 9 MG extended release tablet Take 9 mg by mouth every morning       pantoprazole (PROTONIX) 40 MG tablet Take 40 mg by mouth daily       ferrous sulfate (IRON 325) 325 (65 Fe) MG tablet Take 325 mg by mouth daily (with breakfast)      oxyCODONE-acetaminophen (PERCOCET) 7.5-325 MG per tablet TAKE 1 TABLET BY MOUTH EVERY 6 (SIX) HOURS AS NEEDED FOR UP TO 28 DAYS.  clonazePAM (KLONOPIN) 0.5 MG tablet Take 0.5 mg by mouth 3 times daily as needed.  ticagrelor (BRILINTA) 90 MG TABS tablet Take 1 tablet by mouth 2 times daily 60 tablet 1    nitroGLYCERIN (NITROSTAT) 0.4 MG SL tablet up to max of 3 total doses.  If no relief after 1 dose, call 911. 25 tablet 3    calcium carbonate-vitamin D (CALTRATE) 600-400 MG-UNIT TABS per tab Take 1 tablet by mouth daily       aspirin 81 MG tablet Take 81 mg by mouth daily      traZODone (DESYREL) 100 MG tablet Take 100 mg by mouth nightly       Allergies   Allergen Reactions    Morphine Anaphylaxis and Hives     feels like throat is closing    Penicillins Hives and Swelling    Codeine Hives and Rash    Penicillin G Rash       REVIEW OF SYSTEMS  10 systems reviewed, pertinent positives per HPI otherwise noted to be negative. PHYSICAL EXAM  BP (!) 157/76   Pulse 54   Temp 99.7 °F (37.6 °C) (Oral)   Resp 16   Wt 110 lb (49.9 kg)   SpO2 99%   BMI 19.49 kg/m²   GENERAL APPEARANCE: Awake and alert. Cooperative. Appears thin, chronically ill but not acutely toxic. HEAD: Normocephalic. Atraumatic. EYES: PERRL. EOM's grossly intact. ENT: Mucous membranes are moist.   NECK: Supple, trachea midline. HEART: RRR. Normal S1, S2. No murmurs, rubs or gallops. LUNGS: Respirations unlabored. CTAB. Moderate air exchange. No wheezes, rales, or rhonchi. Speaking comfortably in full sentences. ABDOMEN: Soft. Non-distended. Non-tender. No guarding or rebound. Normal Bowel sounds. EXTREMITIES: No peripheral edema. MAEE. No acute deformities. SKIN: Warm and dry. No acute rashes. NEUROLOGICAL: Alert and oriented X 3. CN II-XII intact. No gross facial drooping. Strength 5/5, sensation intact. No pronator drift. Normal coordination. PSYCHIATRIC: Appears somewhat anxious at other times with a flat affect. LABS  I have reviewed all labs for this visit.    Results for orders placed or performed during the hospital encounter of 09/27/21   COVID-19, Rapid    Specimen: Nasopharyngeal Swab   Result Value Ref Range    SARS-CoV-2, NAAT Not Detected Not Detected   CBC Auto Differential   Result Value Ref Range    WBC 5.1 4.0 - 11.0 K/uL    RBC 3.68 (L) 4.00 - 5.20 M/uL    Hemoglobin 10.6 (L) 12.0 - 16.0 g/dL    Hematocrit 32.2 (L) 36.0 - 48.0 % MCV 87.4 80.0 - 100.0 fL    MCH 28.8 26.0 - 34.0 pg    MCHC 32.9 31.0 - 36.0 g/dL    RDW 13.0 12.4 - 15.4 %    Platelets 176 151 - 064 K/uL    MPV 8.8 5.0 - 10.5 fL    Neutrophils % 57.0 %    Lymphocytes % 35.7 %    Monocytes % 5.8 %    Eosinophils % 0.9 %    Basophils % 0.6 %    Neutrophils Absolute 2.9 1.7 - 7.7 K/uL    Lymphocytes Absolute 1.8 1.0 - 5.1 K/uL    Monocytes Absolute 0.3 0.0 - 1.3 K/uL    Eosinophils Absolute 0.0 0.0 - 0.6 K/uL    Basophils Absolute 0.0 0.0 - 0.2 K/uL   Troponin   Result Value Ref Range    Troponin <0.01 <0.01 ng/mL   Brain Natriuretic Peptide   Result Value Ref Range    Pro- (H) 0 - 124 pg/mL   Blood Gas, Venous   Result Value Ref Range    pH, Kyle 7.435 7.350 - 7.450    pCO2, Kyle 33.7 (L) 40.0 - 50.0 mmHg    pO2, Kyle 177.6 (H) 25 - 40 mmHg    HCO3, Venous 22.1 (L) 23.0 - 29.0 mmol/L    Base Excess, Kyle -1.5 -3.0 - 3.0 mmol/L    O2 Sat, Kyle 99 Not Established %    Carboxyhemoglobin 4.9 (H) 0.0 - 1.5 %    MetHgb, Kyle 0.6 <1.5 %    TC02 (Calc), Kyle 23 Not Established mmol/L    O2 Therapy Unknown    Comprehensive metabolic panel   Result Value Ref Range    Sodium 130 (L) 136 - 145 mmol/L    Potassium 4.6 3.5 - 5.1 mmol/L    Chloride 97 (L) 99 - 110 mmol/L    CO2 24 21 - 32 mmol/L    Anion Gap 9 3 - 16    Glucose 451 (H) 70 - 99 mg/dL    BUN 22 (H) 7 - 20 mg/dL    CREATININE 1.2 0.6 - 1.2 mg/dL    GFR Non-African American 45 (A) >60    GFR  55 (A) >60    Calcium 9.6 8.3 - 10.6 mg/dL    Total Protein 8.1 6.4 - 8.2 g/dL    Albumin 4.3 3.4 - 5.0 g/dL    Albumin/Globulin Ratio 1.1 1.1 - 2.2    Total Bilirubin 0.3 0.0 - 1.0 mg/dL    Alkaline Phosphatase 166 (H) 40 - 129 U/L    ALT 50 (H) 10 - 40 U/L    AST 31 15 - 37 U/L    Globulin 3.8 g/dL   Magnesium   Result Value Ref Range    Magnesium 2.30 1.80 - 2.40 mg/dL   POCT Glucose   Result Value Ref Range    POC Glucose 452 (H) 70 - 99 mg/dl    Performed on ACCU-CHEK    EKG 12 Lead   Result Value Ref Range Ventricular Rate 62 BPM    Atrial Rate 62 BPM    P-R Interval 150 ms    QRS Duration 78 ms    Q-T Interval 418 ms    QTc Calculation (Bazett) 424 ms    P Axis 57 degrees    R Axis -17 degrees    T Axis 39 degrees    Diagnosis       Normal sinus rhythmNormal ECGWhen compared with ECG of 08-AUG-2021 14:40,No significant change was found       EKG  Normal sinus rhythm, rate 62, normal axis, QTC within normal limits, no acute ST or T wave changes from prior on 8/8/2021      RADIOLOGY  X-RAYS:  I have reviewed radiologic plain film image(s). ALL OTHER NON-PLAIN FILM IMAGES SUCH AS CT, ULTRASOUND AND MRI HAVE BEEN READ BY THE RADIOLOGIST. XR CHEST PORTABLE   Final Result   No acute cardiopulmonary disease. Mild right basilar atelectasis. ED COURSE/MDM  Patient seen and evaluated. Old records reviewed. Patient presenting with several days of hyperglycemia with glucose in the 400s, 452 on arrival in the emergency department. She also reports some dyspnea particularly with ambulation. Extensive past medical history. Fluids and insulin ordered however have not been given due to emergency department congestion due to the ongoing COVID-19 surge. Patient signed out at shift change. New Prescriptions    No medications on file       CLINICAL IMPRESSION  1. Controlled type 2 diabetes mellitus with hyperglycemia, with long-term current use of insulin ([address]    2. CHAMBERS (dyspnea on exertion)        Blood pressure (!) 157/76, pulse 54, temperature 99.7 °F (37.6 °C), temperature source Oral, resp. rate 16, weight 110 lb (49.9 kg), SpO2 99 %, not currently breastfeeding. DISPOSITION  Nery Aldana was signed out to Dr Velma Hernandez at shift change in stable condition.         Gurdeep Reyes MD  09/27/21 1508

## 2021-09-28 NOTE — ED PROVIDER NOTES
Patient was signed out to me by Dr. Simeon Solorio at [phone] to follow-up on urinalysis and repeat glucose check. When I spoke to the patient, she states she started developing chest pain with shortness of breath around 3 PM today. She also reports over the last 2 days she has been having trouble with regulating her glucose. She does take insulin at home and states she is not out of any of her medications and takes them as prescribed. She has been under a lot of stress recently since her sister was just pronounced brain dead after having a myocardial infarction. She also complains of having a moderate to severe headache similar to prior headaches although up with her in intensity and not the worst of her life. She denies any falls or trauma. No syncope. No abdominal pain. She does complain of urinary frequency. She does complain of some low back pain but states this is chronic. Due to concern for persistent hyperglycemia with the associated chest pain and headache, she came to the ED for further assessment. She also reports having some diarrhea today. She is nauseated. She did get her Covid vaccine. Physical:   Gen: No acute distress. AOx3. Psych: Depressed mood and affect  HEENT: NCAT, MMM  Neck: supple, normal ROM, no nuchal rigidity  Cardiac: RRR, pulses 2+ in upper extremities  Lungs: C2AB, no R/R/W  Abdomen: soft and nontender with no R/D/G  Neuro: no focal neuro deficits with strength and sensation 5/5 in all 4 extremities    The Ekg interpreted by me shows  normal sinus rhythm with a rate of 62  Axis is   Normal  QTc is  normal  Intervals and Durations are unremarkable. ST Segments: normal and no acute changes   No significant change from prior EKG dated - 8/8/21  No STEMI, bradycardia improved with this EKG       MDM: Patient was evaluated due to concern for chest pain with shortness of breath along with having a headache today.  She initially stated that chest pain felt similar to whenever she had cardiac disease although when I asked her a second time she states it felt different and therefore at this time it is hard to say what type of chest pain this truly is. She does describe it as a pressure and heaviness. She has been under a lot of stress recently in regards to her sister being declared brain dead and stating that they are currently discussing taking her off the ventilator. She had no focal neuro deficits on exam and story not suggestive of subarachnoid hemorrhage. We will treat with Reglan along with Benadryl for the headache to see if this helps. For the hyperglycemia, she had IV fluids and insulin ordered although this had never been given. She had some improvement of the headache while in the emergency department. Chest pain did mostly resolved while in the ED. Due to concern for increasing dyspnea on exertion recently along with developing new onset chest pain with shortness of breath earlier today with significant cardiac history and recent stress test showing abnormal findings, I do not feel that she is safe for discharge at this point and would benefit from further cardiac assessment. Repeat troponin was negative. Her glucose did improve while in the ED. She was stable for the floor with telemetry and felt comfortable with this plan. I paged Dr. Jerica Nguyen for admission.        Nithin Villarreal MD  09/28/21 0695

## 2021-09-28 NOTE — PROGRESS NOTES
Pt admitted in stable condition. Pt a/o. VSS. Admission and assessment complete and charted. Skin unremarkable. Lungs diminished. BS active. Pt stable and denied needs.

## 2021-09-28 NOTE — PROGRESS NOTES
PS Dr. Lance Dooley, \"Pt wants home Percocet [id number] for back pain- takes 7.5mg q6 prn. Thank you. \"

## 2021-09-29 ENCOUNTER — APPOINTMENT (OUTPATIENT)
Dept: CARDIAC CATH/INVASIVE PROCEDURES | Age: 61
End: 2021-09-29
Payer: MEDICAID

## 2021-09-29 VITALS
DIASTOLIC BLOOD PRESSURE: 73 MMHG | HEART RATE: 60 BPM | TEMPERATURE: 98.4 F | HEIGHT: 63 IN | OXYGEN SATURATION: 99 % | BODY MASS INDEX: 19.61 KG/M2 | WEIGHT: 110.67 LBS | RESPIRATION RATE: 16 BRPM | SYSTOLIC BLOOD PRESSURE: 151 MMHG

## 2021-09-29 LAB
A/G RATIO: 1.2 (ref 1.1–2.2)
ALBUMIN SERPL-MCNC: 3.8 G/DL (ref 3.4–5)
ALP BLD-CCNC: 138 U/L (ref 40–129)
ALT SERPL-CCNC: 39 U/L (ref 10–40)
ANION GAP SERPL CALCULATED.3IONS-SCNC: 10 MMOL/L (ref 3–16)
AST SERPL-CCNC: 27 U/L (ref 15–37)
BASOPHILS ABSOLUTE: 0 K/UL (ref 0–0.2)
BASOPHILS RELATIVE PERCENT: 0.4 %
BILIRUB SERPL-MCNC: 0.6 MG/DL (ref 0–1)
BUN BLDV-MCNC: 13 MG/DL (ref 7–20)
CALCIUM SERPL-MCNC: 9.5 MG/DL (ref 8.3–10.6)
CHLORIDE BLD-SCNC: 103 MMOL/L (ref 99–110)
CO2: 26 MMOL/L (ref 21–32)
CREAT SERPL-MCNC: 1.2 MG/DL (ref 0.6–1.2)
EOSINOPHILS ABSOLUTE: 0.1 K/UL (ref 0–0.6)
EOSINOPHILS RELATIVE PERCENT: 1.5 %
GFR AFRICAN AMERICAN: 55
GFR NON-AFRICAN AMERICAN: 45
GLOBULIN: 3.2 G/DL
GLUCOSE BLD-MCNC: 132 MG/DL (ref 70–99)
GLUCOSE BLD-MCNC: 414 MG/DL (ref 70–99)
GLUCOSE BLD-MCNC: 80 MG/DL (ref 70–99)
GLUCOSE BLD-MCNC: 98 MG/DL (ref 70–99)
HCT VFR BLD CALC: 33.7 % (ref 36–48)
HEMOGLOBIN: 10.9 G/DL (ref 12–16)
LV EF: 60 %
LVEF MODALITY: NORMAL
LYMPHOCYTES ABSOLUTE: 2.7 K/UL (ref 1–5.1)
LYMPHOCYTES RELATIVE PERCENT: 50.9 %
MCH RBC QN AUTO: 28.5 PG (ref 26–34)
MCHC RBC AUTO-ENTMCNC: 32.3 G/DL (ref 31–36)
MCV RBC AUTO: 88.1 FL (ref 80–100)
MONOCYTES ABSOLUTE: 0.5 K/UL (ref 0–1.3)
MONOCYTES RELATIVE PERCENT: 9.6 %
NEUTROPHILS ABSOLUTE: 2 K/UL (ref 1.7–7.7)
NEUTROPHILS RELATIVE PERCENT: 37.6 %
PDW BLD-RTO: 13.3 % (ref 12.4–15.4)
PERFORMED ON: ABNORMAL
PERFORMED ON: ABNORMAL
PERFORMED ON: NORMAL
PLATELET # BLD: 161 K/UL (ref 135–450)
PMV BLD AUTO: 8.7 FL (ref 5–10.5)
POTASSIUM REFLEX MAGNESIUM: 3.6 MMOL/L (ref 3.5–5.1)
RBC # BLD: 3.83 M/UL (ref 4–5.2)
SODIUM BLD-SCNC: 139 MMOL/L (ref 136–145)
TOTAL PROTEIN: 7 G/DL (ref 6.4–8.2)
WBC # BLD: 5.4 K/UL (ref 4–11)

## 2021-09-29 PROCEDURE — 6370000000 HC RX 637 (ALT 250 FOR IP): Performed by: INTERNAL MEDICINE

## 2021-09-29 PROCEDURE — 6370000000 HC RX 637 (ALT 250 FOR IP)

## 2021-09-29 PROCEDURE — 80053 COMPREHEN METABOLIC PANEL: CPT

## 2021-09-29 PROCEDURE — 2500000003 HC RX 250 WO HCPCS

## 2021-09-29 PROCEDURE — 85347 COAGULATION TIME ACTIVATED: CPT

## 2021-09-29 PROCEDURE — 2580000003 HC RX 258

## 2021-09-29 PROCEDURE — G0378 HOSPITAL OBSERVATION PER HR: HCPCS

## 2021-09-29 PROCEDURE — 2709999900 HC NON-CHARGEABLE SUPPLY

## 2021-09-29 PROCEDURE — C1769 GUIDE WIRE: HCPCS

## 2021-09-29 PROCEDURE — C1894 INTRO/SHEATH, NON-LASER: HCPCS

## 2021-09-29 PROCEDURE — 36415 COLL VENOUS BLD VENIPUNCTURE: CPT

## 2021-09-29 PROCEDURE — 99152 MOD SED SAME PHYS/QHP 5/>YRS: CPT | Performed by: INTERNAL MEDICINE

## 2021-09-29 PROCEDURE — 6360000002 HC RX W HCPCS

## 2021-09-29 PROCEDURE — 85025 COMPLETE CBC W/AUTO DIFF WBC: CPT

## 2021-09-29 PROCEDURE — 83036 HEMOGLOBIN GLYCOSYLATED A1C: CPT

## 2021-09-29 PROCEDURE — 93458 L HRT ARTERY/VENTRICLE ANGIO: CPT | Performed by: INTERNAL MEDICINE

## 2021-09-29 PROCEDURE — 99156 MOD SED OTH PHYS/QHP 5/>YRS: CPT

## 2021-09-29 PROCEDURE — 93458 L HRT ARTERY/VENTRICLE ANGIO: CPT

## 2021-09-29 PROCEDURE — C8923 2D TTE W OR W/O FOL W/CON,CO: HCPCS

## 2021-09-29 RX ORDER — ACETAMINOPHEN 325 MG/1
650 TABLET ORAL EVERY 4 HOURS PRN
Status: DISCONTINUED | OUTPATIENT
Start: 2021-09-29 | End: 2021-09-29 | Stop reason: HOSPADM

## 2021-09-29 RX ORDER — SODIUM CHLORIDE 9 MG/ML
INJECTION, SOLUTION INTRAVENOUS CONTINUOUS
Status: DISCONTINUED | OUTPATIENT
Start: 2021-09-29 | End: 2021-09-29 | Stop reason: HOSPADM

## 2021-09-29 RX ORDER — SODIUM CHLORIDE 0.9 % (FLUSH) 0.9 %
5-40 SYRINGE (ML) INJECTION EVERY 12 HOURS SCHEDULED
Status: DISCONTINUED | OUTPATIENT
Start: 2021-09-29 | End: 2021-09-29 | Stop reason: HOSPADM

## 2021-09-29 RX ORDER — SODIUM CHLORIDE 9 MG/ML
25 INJECTION, SOLUTION INTRAVENOUS PRN
Status: DISCONTINUED | OUTPATIENT
Start: 2021-09-29 | End: 2021-09-29 | Stop reason: HOSPADM

## 2021-09-29 RX ORDER — NITROGLYCERIN 0.4 MG/1
TABLET SUBLINGUAL
Qty: 25 TABLET | Refills: 0 | Status: SHIPPED | OUTPATIENT
Start: 2021-09-29

## 2021-09-29 RX ORDER — MIDAZOLAM HYDROCHLORIDE 5 MG/ML
INJECTION INTRAMUSCULAR; INTRAVENOUS
Status: COMPLETED | OUTPATIENT
Start: 2021-09-29 | End: 2021-09-29

## 2021-09-29 RX ORDER — FENTANYL CITRATE 50 UG/ML
INJECTION, SOLUTION INTRAMUSCULAR; INTRAVENOUS
Status: COMPLETED | OUTPATIENT
Start: 2021-09-29 | End: 2021-09-29

## 2021-09-29 RX ORDER — SODIUM CHLORIDE 0.9 % (FLUSH) 0.9 %
5-40 SYRINGE (ML) INJECTION PRN
Status: DISCONTINUED | OUTPATIENT
Start: 2021-09-29 | End: 2021-09-29 | Stop reason: HOSPADM

## 2021-09-29 RX ORDER — HEPARIN SODIUM 1000 [USP'U]/ML
INJECTION, SOLUTION INTRAVENOUS; SUBCUTANEOUS
Status: COMPLETED | OUTPATIENT
Start: 2021-09-29 | End: 2021-09-29

## 2021-09-29 RX ADMIN — TICAGRELOR 90 MG: 90 TABLET ORAL at 09:55

## 2021-09-29 RX ADMIN — ACETAMINOPHEN 650 MG: 325 TABLET ORAL at 01:08

## 2021-09-29 RX ADMIN — HEPARIN SODIUM 5000 UNITS: 1000 INJECTION, SOLUTION INTRAVENOUS; SUBCUTANEOUS at 10:48

## 2021-09-29 RX ADMIN — CETIRIZINE HYDROCHLORIDE 10 MG: 10 TABLET, FILM COATED ORAL at 09:38

## 2021-09-29 RX ADMIN — MIDAZOLAM HYDROCHLORIDE 1 MG: 5 INJECTION INTRAMUSCULAR; INTRAVENOUS at 10:46

## 2021-09-29 RX ADMIN — FENTANYL CITRATE 50 MCG: 50 INJECTION, SOLUTION INTRAMUSCULAR; INTRAVENOUS at 10:46

## 2021-09-29 RX ADMIN — Medication 500 MG: at 09:38

## 2021-09-29 RX ADMIN — ASPIRIN 81 MG: 81 TABLET, COATED ORAL at 09:24

## 2021-09-29 RX ADMIN — LISINOPRIL 20 MG: 20 TABLET ORAL at 14:59

## 2021-09-29 RX ADMIN — OXYCODONE AND ACETAMINOPHEN 1 TABLET: 7.5; 325 TABLET ORAL at 14:58

## 2021-09-29 RX ADMIN — ATORVASTATIN CALCIUM 20 MG: 10 TABLET, FILM COATED ORAL at 09:37

## 2021-09-29 RX ADMIN — PANTOPRAZOLE SODIUM 40 MG: 40 TABLET, DELAYED RELEASE ORAL at 14:58

## 2021-09-29 RX ADMIN — INSULIN GLARGINE 13 UNITS: 100 INJECTION, SOLUTION SUBCUTANEOUS at 09:24

## 2021-09-29 RX ADMIN — INSULIN LISPRO 6 UNITS: 100 INJECTION, SOLUTION INTRAVENOUS; SUBCUTANEOUS at 16:42

## 2021-09-29 RX ADMIN — GABAPENTIN 600 MG: 300 CAPSULE ORAL at 14:57

## 2021-09-29 RX ADMIN — FLUTICASONE PROPIONATE 1 SPRAY: 50 SPRAY, METERED NASAL at 09:27

## 2021-09-29 ASSESSMENT — PAIN SCALES - GENERAL
PAINLEVEL_OUTOF10: 8
PAINLEVEL_OUTOF10: 10

## 2021-09-29 NOTE — PLAN OF CARE
Nutrition Problem #1: Inadequate oral intake  Intervention: Food and/or Nutrient Delivery: Continue Current Diet, Start Oral Nutrition Supplement  Nutritional Goals: Consume 50% or greater of 3 meals per day and ONS

## 2021-09-29 NOTE — PROCEDURES
CARDIAC CATHETERIZATION REPORT     Procedure Date:  2021  Patient Name: Marina Concepcion  MRN: [id number] : 1960      INDICATION     Progressive angina, class IV    PROCEDURES PERFORMED     Left heart catheterization  LVgram  Coronary angiogam  Coronary cath  Monitoring of moderate conscious sedation          PROCEDURE DESCRIPTION   Risks/benefits/alternatives/outcomes were discussed with patient and/or family and informed consent was obtained. Using the [facility] scale, the patient's right radial artery was found to be a level B. Patient was prepped draped in the usual sterile fashion. Local anaesthetic was applied over puncture site. Using a front wall technique, a 4/5 [place] Terumo sheath was inserted into right radial artery. Verapamil, nitroglycerin, nicardipine were administered through the sheath. Heparin was administered. Diagnostic 5 Kiswahili pigtail, ultra Catheters were used for diagnostic angiograms. At the conclusion of the procedure, a TR band was placed over the puncture site and hemostasis was obtained. There were no immediate complications. I supervised sedation from 10:46 AM to 11:03 AM with versed 1 mg/fentanyl 50 mcg during the procedure. An independent trained observer pushed meds at my direction. We monitored the patient's level of consciousness and vital signs/physiologic status throughout the procedure duration (see times listed previously). 80 cc contrast was utilized. <20cc EBL. Pt declined for me to call family to give update. FINDINGS       LVGRAM    LVEDP  6   GRADIENT ACROSS AORTIC VALVE  none   LV FUNCTION EF 60%   WALL MOTION normal   MITRAL REGURGITATION mild         CORONARY ARTERIES    LM Less than 10% [id number] stenosis         LAD  Proximal 20% stenosis, mid 40 to 50% stenosis. Distal less than 10% stenosis. D1 has a high takeoff, and has a proximal-mid stent which is patent with 30 to 40% in-stent restenosis.        LCX Proximal-mid 10 to 20% stenosis. Mid-distal 40 to 50% stenosis that extends into a large OM1. RI The high takeoff of D1 could also be described as a ramus artery. See above for details. RCA Dominant, medium sized vessel, [id number] 30% stenosis. CONCLUSIONS:     Patent diagonal artery stent  Mild to moderate residual CAD/ASHD  Treat medically  Continue aspirin, statin, lisinopril. No beta-blocker due to bradycardia. No further inpatient cardiac work-up or treatment is planned, will sign off, please call with questions.

## 2021-09-29 NOTE — PROGRESS NOTES
Pt assessment completed and charted. VSS. Pt a/ox4. Pt denies pain. Pt Npo at midnight and accepting. Pt reports eating her lunch and dinner at same time, that's why he evening sugar was so high. Pt denies any other needs at this time. Pt calls out appropriately. Pt is a fall risk;  -Bed in lowest position and wheels locked. -Call light within reach.   -Bedside table within reach.   -Non-skid footwear in place.

## 2021-09-29 NOTE — DISCHARGE SUMMARY
Hospital Medicine Discharge Summary    Patient ID: Miryam You      Patient's PCP: Olivia Brunner Date: 9/27/2021     Discharge Date: 9/29/2021 9/29/2021    Admitting Physician: Ariel Lazo MD     Discharge Physician: Amara Vora MD     Discharge Diagnoses: Active Hospital Problems    Diagnosis     Chest pain [R07.9]        The patient was seen and examined on day of discharge and this discharge summary is in conjunction with any daily progress note from day of discharge. Hospital Course: 57year old [place] American female with a PMH of T2DM, breast cancer (s/p lumpectomy/radiation), CAD (stent placement in 2020), and former smoking history (10 pack years) presents to [facility] ED for hyperglycemia and chest pain. Her BG at time of arrival in ED was 452. She states she \"chronically has diabetes\" and follows with endocrinology, however, she says her \"blood sugar is not well controlled\" per the cardiology note. She claims she has been \"under a lot of stress lately because my sister recently passed away from a heart attack. \" She describes her chest pain as \"centrally located\" and rated it a 9/10. It was associated with SOB. She denied any nausea or vomiting. EKG showed normal sinus rhythm with no ST/T wave abnormalities and Troponin was negative, <0.01. She had a stress test done in May 2020 which was abnormal. Cardiology was consulted. An echocardiogram was done on 9/29- The left ventricular systolic function is mildly reduced with an ejection fraction of 50- 55%. No change in LVF when compared to previous study from 5/20/2021. Cardiac catheterization on 9/29/21- Patent diagonal artery stent, mild to moderate residual CAD/ASHD, treat medically (continue aspirin, statin, lisinopril, no beta-blocker due to bradycardia). Per cardiology note, \"Based on this cath, no further inpatient cardiac workup or treatment is planned, will sign off. \"  Patient discharged home in stable medical condition with clear post cath instructions. Patient to continue right catheter site armband for 24 hours and can remove after and she is strictly advised not to lift anything heavy with her right hand. Patient verbalized understanding and is discharged home in stable medical condition. Physical Exam Performed:   BP (!) 151/73   Pulse 60   Temp 98.4 °F (36.9 °C) (Oral)   Resp 16   Ht 5' 3\" (1.6 m)   Wt 110 lb 10.7 oz (50.2 kg)   SpO2 99%   BMI 19.60 kg/m²       General appearance:  No apparent distress, appears stated age and cooperative. HEENT:  Normal cephalic, atraumatic without obvious deformity. Pupils equal, round, and reactive to light. Extra ocular muscles intact. Conjunctivae/corneas clear. Neck: Supple, with full range of motion. No jugular venous distention. Trachea midline. Respiratory:  Normal respiratory effort. Clear to auscultation, bilaterally without Rales/Wheezes/Rhonchi. Cardiovascular:  Regular rate and rhythm with normal S1/S2 without murmurs, rubs or gallops. Abdomen: Soft, non-tender, non-distended with normal bowel sounds. Musculoskeletal:  No clubbing, cyanosis or edema bilaterally. Full range of motion without deformity. Right wrist in band from her right arterial catheterization for her [facility]. Skin: Skin color, texture, turgor normal.  No rashes or lesions. Neurologic:  Neurovascularly intact without any focal sensory/motor deficits. Cranial nerves: II-XII intact, grossly non-focal.  Psychiatric:  Alert and oriented, thought content appropriate, normal insight  Capillary Refill: Brisk,< 3 seconds   Peripheral Pulses: +2 palpable, equal bilaterally       Labs:  For convenience and continuity at follow-up the following most recent labs are provided:    CBC:    Lab Results   Component Value Date    WBC 5.4 09/29/2021    HGB 10.9 09/29/2021    HCT 33.7 09/29/2021     09/29/2021       Renal:    Lab Results   Component Value Date     09/29/2021    K 3.6 09/29/2021     09/29/2021    CO2 26 09/29/2021    BUN 13 09/29/2021    CREATININE 1.2 09/29/2021    CALCIUM 9.5 09/29/2021    PHOS 2.9 03/06/2021         Significant Diagnostic Studies    Radiology:   XR CHEST PORTABLE   Final Result   No acute cardiopulmonary disease. Mild right basilar atelectasis. Consults:   IP CONSULT TO HOSPITALIST  IP CONSULT TO CARDIOLOGY      Disposition:  Home     Condition at Discharge: Stable    Discharge Instructions/Follow-up:  Follow up with PCP in one week's time and continue home meds. Code Status:  Prior      Activity: activity as tolerated    Diet: cardiac diet      Discharge Medications:   Discharge Medication List as of 9/29/2021  4:26 PM           Details   nitroGLYCERIN (NITROSTAT) 0.4 MG SL tablet up to max of 3 total doses.  If no relief after 1 dose, call 911., Disp-25 tablet, R-0Normal              Details   empagliflozin (JARDIANCE) 25 MG tablet Take 25 mg by mouth dailyHistorical Med      insulin lispro, 1 Unit Dial, (HUMALOG KWIKPEN) 100 UNIT/ML SOPN Inject into the skin 3 times dailyHistorical Med      glimepiride (AMARYL) 2 MG tablet 2 mg with breakfast or first meal of the day, Disp-30 tablet, R-5Normal      fluticasone (FLONASE) 50 MCG/ACT nasal spray 1 spray by Each Nostril route daily, Disp-1 Bottle, R-1Normal      cetirizine (ZYRTEC) 10 MG tablet Take 1 tablet by mouth daily, Disp-30 tablet, R-1Normal      insulin glargine (LANTUS SOLOSTAR) 100 UNIT/ML injection pen Inject 35 Units into the skin nightly, Disp-1 pen, R-1Normal      TRUE METRIX BLOOD GLUCOSE TEST strip USE AS DIRECTED TO TEST 4 TIMES A DAY, Disp-200 strip, R-5Normal      VICTOZA 18 MG/3ML SOPN SC injection INJECT 1.8 MG UNDER THE SKIN ONCE DAILY, DAWHistorical Med      Blood Glucose Monitoring Suppl (TRUE METRIX METER) w/Device KIT Used to  check blood sugar 4 times eyad, Disp-1 kit, R-1Normal      gabapentin (NEURONTIN) 600 MG tablet Take 0.5 tablets by mouth 2 times daily for 3 days. , Disp-90 tablet, R-3NO PRINT      lisinopril (PRINIVIL;ZESTRIL) 40 MG tablet Take 0.5 tablets by mouth daily, Disp-90 tablet, R-1NO PRINT      atorvastatin (LIPITOR) 20 MG tablet Take 1 tablet by mouth daily, Disp-30 tablet, R-5Normal      paliperidone (INVEGA) 9 MG extended release tablet Take 9 mg by mouth every morning Historical Med      pantoprazole (PROTONIX) 40 MG tablet Take 40 mg by mouth daily Historical Med      ferrous sulfate (IRON 325) 325 (65 Fe) MG tablet Take 325 mg by mouth daily (with breakfast)Historical Med      oxyCODONE-acetaminophen (PERCOCET) 7.5-325 MG per tablet TAKE 1 TABLET BY MOUTH EVERY 6 (SIX) HOURS AS NEEDED FOR UP TO 28 DAYS. Historical Med      clonazePAM (KLONOPIN) 0.5 MG tablet Take 0.5 mg by mouth 3 times daily as needed. Historical Med      ticagrelor (BRILINTA) 90 MG TABS tablet Take 1 tablet by mouth 2 times daily, Disp-60 tablet, R-1Print      calcium carbonate-vitamin D (CALTRATE) 600-400 MG-UNIT TABS per tab Take 1 tablet by mouth daily Historical Med      aspirin 81 MG tablet Take 81 mg by mouth dailyHistorical Med      traZODone (DESYREL) 100 MG tablet Take 100 mg by mouth nightly             Time Spent on discharge is more than 30 minutes in the examination, evaluation, counseling and review of medications and discharge plan. Patient case seen and discussed under supervision of preceptor, Loyda Neri. Note made by MAT student in the status of a scribe, with review by preceptor. MD RYAN De JesusS2     Addendum to RANGEL darby discharge summary note:  Pt seen, examined, and evaluated with RANGEL darby, who acted as my scribe for the above documentation. I have reviewed the current history, physical findings, labs, assessment, and plan and agree with the note as documented. Yovani De La Fuente MD  Hospitalist Physician       The note was completed using EMR. Every effort was made to ensure accuracy;  However, inadvertent computerized transcription errors may be present. Thank you Jordan Viera for the opportunity to be involved in this patient's care. If you have any questions or concerns please feel free to contact me at [phone].

## 2021-09-29 NOTE — PROGRESS NOTES
Pt left floor to go to cath lab. [facility] notified. Consent filled out and in chart, per cath lab, send pt down and they will have it signed.

## 2021-09-29 NOTE — PROGRESS NOTES
Hospitalist Progress Note      PCP: Ceci Mathis    Date of Admission: 9/27/2021    Chief Complaint   Patient presents with    Hyperglycemia     states BG has been in 400s for two days, denies any other complaint at this time. Hospital Course: 57[facility] with a PMH of T2DM, breast cancer (s/p lumpectomy/radiation), CAD (stent placement in 2020), and former smoking history (10 pack years) presents to [facility] ED for hyperglycemia and chest pain. Her BG at time of arrival in ED was 452. She states she \"chronically has diabetes\" and follows with endocrinology, however, she says her \"blood sugar is not well controlled\" per the cardiology note. She claims she has been \"under a lot of stress lately because my sister recently passed away from a heart attack. \" She describes her chest pain as \"centrally located\" and rated it a 9/10. It was associated with SOB. She denied any nausea or vomiting. EKG showed normal sinus rhythm with no ST/T wave abnormalities and Troponin was negative, <0.01. She had a stress test done in May 2020 which was abnormal. Cardiology was consulted. An echocardiogram was done on 9/29- The left ventricular systolic function is mildly reduced with an ejection fraction of 50- 55%. No change in LVF when compared to previous study from 5/20/2021. Cardiac catheterization on 9/29/21- Patent diagonal artery stent, mild to moderate residual CAD/ASHD, treat medically (continue aspirin, statin, lisinopril, no beta-blocker due to bradycardia). Per cardiology note, \"Based on this cath, no further inpatient cardiac workup or treatment is planned, will sign off. \"    Subjective: Patient seen and examined this morning. She claims she is doing better today, rating her chest pain a 6/10 today. She complains of a headache today which she rates a 10/10 and says the pain is \"all over. \" She also complains of back pain but has chronic back pain.  She denies any shortness of breath, palpitations, numbness, tingling, or weakness. She is going down to cath lab at the end of my exam.       Medications:  Reviewed    Infusion Medications    dextrose      sodium chloride       Scheduled Medications    insulin lispro  0-6 Units SubCUTAneous Q4H    sodium chloride flush  10 mL IntraVENous 2 times per day    atorvastatin  20 mg Oral Daily    aspirin  81 mg Oral Daily    Calcium Carb-Cholecalciferol  500 mg Oral Daily    insulin glargine  13 Units SubCUTAneous QAM    lisinopril  20 mg Oral Daily    pantoprazole  40 mg Oral Daily    ticagrelor  90 mg Oral BID    traZODone  100 mg Oral Nightly    cetirizine  10 mg Oral Daily    ferrous sulfate  325 mg Oral Daily with breakfast    fluticasone  1 spray Each Nostril Daily    gabapentin  600 mg Oral TID     PRN Meds: glucose, dextrose, glucagon (rDNA), dextrose, sodium chloride flush, sodium chloride, potassium chloride, magnesium sulfate, promethazine **OR** ondansetron, acetaminophen **OR** acetaminophen, clonazePAM, perflutren lipid microspheres, nitroGLYCERIN, oxyCODONE-acetaminophen      Intake/Output Summary (Last 24 hours) at 9/29/2021 0959  Last data filed at 9/29/2021 0442  Gross per 24 hour   Intake 240 ml   Output    Net 240 ml       Physical Exam Performed:    /77   Pulse 58   Temp 97.8 °F (36.6 °C) (Axillary)   Resp 16   Ht 5' 3\" (1.6 m)   Wt 110 lb 10.7 oz (50.2 kg)   SpO2 100%   BMI 19.60 kg/m²     General appearance: No apparent distress, appears stated age and cooperative. HEENT: Pupils equal, round, and reactive to light. Conjunctivae/corneas clear. Neck: Supple, with full range of motion. No jugular venous distention. Trachea midline. Respiratory:  Normal respiratory effort. Clear to auscultation, bilaterally without Rales/Wheezes/Rhonchi. Cardiovascular: Regular rate and rhythm with normal S1/S2 without murmurs, rubs or gallops.   Abdomen: Soft, non-tender, non-distended with normal bowel sounds. Musculoskeletal: No clubbing, cyanosis or edema bilaterally. Full range of motion without deformity. Skin: Skin color, texture, turgor normal.  No rashes or lesions. Neurologic:  Neurovascularly intact without any focal sensory/motor deficits. Cranial nerves: II-XII intact, grossly non-focal. 5/5 strength against resistance in bilateral lower extremities. Psychiatric: Alert and oriented, thought content appropriate, normal insight  Capillary Refill: Brisk,< 3 seconds   Peripheral Pulses: +2 palpable, equal bilaterally       Labs:   Recent Labs     09/27/21 1916 09/29/21  0508   WBC 5.1 5.4   HGB 10.6* 10.9*   HCT 32.2* 33.7*    161     Recent Labs     09/27/21 1916 09/29/21  0508   * 139   K 4.6 3.6   CL 97* 103   CO2 24 26   BUN 22* 13   CREATININE 1.2 1.2   CALCIUM 9.6 9.5     Recent Labs     09/27/21 1916 09/29/21  0508   AST 31 27   ALT 50* 39   BILITOT 0.3 0.6   ALKPHOS 166* 138*     No results for input(s): INR in the last 72 hours. Recent Labs     09/27/21 1916 09/28/21  0025   TROPONINI <0.01 <0.01       Urinalysis:      Lab Results   Component Value Date    NITRU Negative 09/27/2021    WBCUA 6-9 09/27/2021    BACTERIA Rare 09/27/2021    RBCUA 3-4 09/27/2021    BLOODU TRACE-INTACT 09/27/2021    SPECGRAV 1.010 09/27/2021    GLUCOSEU >=1000 09/27/2021    GLUCOSEU >=1000 mg/dL 06/07/2010       Radiology:  XR CHEST PORTABLE   Final Result   No acute cardiopulmonary disease. Mild right basilar atelectasis. Assessment/Plan:    Active Hospital Problems    Diagnosis Date Noted    Chest pain [R07.9] 06/05/2020     - Chest pain, h/o CAD and prior stent placement (2020)    - Central, 6/10 on 9/29/21   - EKG (9/27) normal sinus rhythm   - Troponin negative (<0.01)   - Echo- The left ventricular systolic function is mildly reduced with an ejection fraction of 50- 55%.  No change in LVF when compared to previous study from 5/20/2021   - Stress test- abnormal result in May 2020   - Cardiology consult- Patent diagonal artery stent, mild to moderate residual CAD/ASHD, treat medically (continue aspirin, statin, lisinopril, no beta-blocker due to bradycardia). Per cardiology note, \"Based on this cath, no further inpatient cardiac workup or treatment is planned, will sign off. \"     - Type 2 Diabetes Mellitus- poorly controlled   -  upon arrival to ED   - follows with endocrinology, however still not well managed   - started on insulin sliding scale and low carb diet (now NPO d/t cardiac catheterization)   - POCT glucose Q4 hrs     - last glucose was 132 at 0739 on 9/29/21      DVT Prophylaxis: Lovenox  Diet: Diet NPO Exceptions are: Sips of Water with Meds  Code Status: Full Code    PT/OT Eval Status: Ordered    Dispo - pending clinical improvement and catheterization     Patient case seen and discussed under supervision of preceptor, Dr. Caryle Saras. Note made by PA-S2 student in the status of a scribe, with review by preceptor. Romulo MULTANI-S2      Addendum to PA student progress note:  Pt seen, examined, and evaluated with PA student, who acted as my scribe for the above documentation. I have reviewed the current history, physical findings, labs, assessment, and plan and agree with the note as documented. Elyssa Casanova MD  Hospitalist Physician       The note was completed using EMR. Every effort was made to ensure accuracy; However, inadvertent computerized transcription errors may be present.

## 2021-09-29 NOTE — PROGRESS NOTES
Brief Pre-Op Note/Sedation Assessment      Kaitlin Carrillo  1960  [id number]  9:40 AM    Planned Procedure: Cardiac Catheterization Procedure  Post Procedure Plan: Return to same level of care  Consent: I have discussed with the patient and/or the patient representative the indication, alternatives, and the possible risks and/or complications of the planned procedure and the anesthesia methods. The patient and/or patient representative appear to understand and agree to proceed. DISCUSSION OF CARDIAC CATHETERIZATION PROCEDURES: The procedures, indications, risks and alternatives have been discussed with the patient and, as appropriate, with the patient's guardian . Risks discussed included, but are not limited to, bleeding, development of blood clots/emboli, damage to blood vessels, renal failure, malignant cardiac arrhythmias, stroke, heart attack, emergent coronary bypass surgery, death, dye allergy. The patient (and guardian as appropriate) expressed understanding of the aforementioned and wished to proceed. Chief Complaint:   Chest Pain/Pressure      Indications for Cath Procedure:  1. Presentation:  ACS > 24 hrs  2. Anginal Classification within 2 weeks:  CCS IV - Inability to perform any activity without angina or angina at rest, i.e., severe limitation  3. Angina Symptoms Assessment:  Typical Chest Pain  4. Heart Failure Class within last 2 weeks:  No symptoms  5. Cardiovascular Instability:  No    Prior Ischemic Workup/Eval:  1. Pre-Procedural Medications: Yes: Aspirin, Non-STATIN Cholesterol (Any) and STATIN  2. Stress Test Completed? Yes:  Stress or Imaging Studies Performed (within ANY time period):   Type:  Stress Nuclear  Results:  Positive:  Myocardial Perfusion Defects (Nuclear) Extent of Ischemia:  High Risk (>3% annual death or MI)    Does Patient need surgery?   Cath Valve Surgery:  No    Pre-Procedure Medical History:  Vital Signs:  /77   Pulse 58   Temp 97.8 °F (36.6 °C) (Axillary)   Resp 16   Ht 5' 3\" (1.6 m)   Wt 110 lb 10.7 oz (50.2 kg)   SpO2 100%   BMI 19.60 kg/m²     Allergies:     Allergies   Allergen Reactions    Morphine Anaphylaxis and Hives     feels like throat is closing    Penicillins Hives and Swelling    Codeine Hives and Rash    Penicillin G Rash     Medications:    Current Facility-Administered Medications   Medication Dose Route Frequency Provider Last Rate Last Admin    insulin lispro (HUMALOG) injection vial 0-6 Units  0-6 Units SubCUTAneous Q4H Ahmad A Alina, DO   4 Units at 09/28/21 2327    glucose (GLUTOSE) 40 % oral gel 15 g  15 g Oral PRN Ahmad A Alina, DO        dextrose 50 % IV solution  12.5 g IntraVENous PRN Juan Neve A Alina, DO        glucagon (rDNA) injection 1 mg  1 mg IntraMUSCular PRN Ahmad A Alina, DO        dextrose 5 % solution  100 mL/hr IntraVENous PRN Ahmad A Alina, DO        sodium chloride flush 0.9 % injection 10 mL  10 mL IntraVENous 2 times per day Juan Neve A Alina, DO   10 mL at 09/28/21 2042    sodium chloride flush 0.9 % injection 10 mL  10 mL IntraVENous PRN Ahmad A Alina, DO        0.9 % sodium chloride infusion  25 mL IntraVENous PRN Juan Neve A Alina, DO        potassium chloride 10 mEq/100 mL IVPB (Peripheral Line)  10 mEq IntraVENous PRN Elsy Xuan Alina, DO        magnesium sulfate 2000 mg in 50 mL IVPB premix  2,000 mg IntraVENous PRN Juan Neve A Alina, DO        promethazine (PHENERGAN) tablet 12.5 mg  12.5 mg Oral Q6H PRN Ahmad A Alina, DO        Or    ondansetron (ZOFRAN) injection 4 mg  4 mg IntraVENous Q6H PRN Juan Neve A Alina, DO        acetaminophen (TYLENOL) tablet 650 mg  650 mg Oral Q6H PRN Ahmad A Alina, DO   650 mg at 09/29/21 0108    Or    acetaminophen (TYLENOL) suppository 650 mg  650 mg Rectal Q6H PRN Ahmad A Alina, DO        atorvastatin (LIPITOR) tablet 20 mg  20 mg Oral Daily Ahmad A Alina, DO   20 mg at 09/28/21 0826    aspirin EC tablet 81 mg  81 mg Oral Daily Vale Fuller, DO   81 mg at 09/29/21 0924    Calcium Carb-Cholecalciferol 250-125 MG-UNIT TABS 500 mg  500 mg Oral Daily [facility] MARCIANO Alina, DO   500 mg at 09/28/21 9232    clonazePAM (KLONOPIN) tablet 0.5 mg  0.5 mg Oral BID PRN Tami Interiano A Alina, DO        insulin glargine (LANTUS) injection vial 13 Units  13 Units SubCUTAneous QAM [facility] MARCINAO Fuller DO   13 Units at 09/29/21 0924    lisinopril (PRINIVIL;ZESTRIL) tablet 20 mg  20 mg Oral Daily Herrick Campuszi, DO   20 mg at 09/28/21 0826    pantoprazole (PROTONIX) tablet 40 mg  40 mg Oral Daily Herrick Campuszi, DO   40 mg at 09/28/21 9472    ticagrelor (BRILINTA) tablet 90 mg  90 mg Oral BID Herrick Campuszi, DO   90 mg at 09/28/21 2042    traZODone (DESYREL) tablet 100 mg  100 mg Oral Nightly [facility] MARCIANO McwilliamsAlina, DO   100 mg at 09/28/21 2042    perflutren lipid microspheres (DEFINITY) injection 1.65 mg  1.5 mL IntraVENous ONCE PRN Romy Fermin MD        cetirizine (ZYRTEC) tablet 10 mg  10 mg Oral Daily Antony Ryder MD   10 mg at 09/28/21 1759    ferrous sulfate (IRON 325) tablet 325 mg  325 mg Oral Daily with breakfast Antony Ryder MD        fluticasone (FLONASE) 50 MCG/ACT nasal spray 1 spray  1 spray Each Nostril Daily Antony Ryder MD   1 spray at 09/29/21 3866    gabapentin (NEURONTIN) capsule 600 mg  600 mg Oral TID Antony Ryder MD   600 mg at 09/28/21 2042    nitroGLYCERIN (NITROSTAT) SL tablet 0.4 mg  0.4 mg SubLINGual Q5 Min PRN Antony Ryder MD        oxyCODONE-acetaminophen (PERCOCET) 7.5-325 MG per tablet 1 tablet  1 tablet Oral Q6H PRN Antony Ryder MD   1 tablet at 09/28/21 5438       Past Medical History:    Past Medical History:   Diagnosis Date    Abnormal brain MRI 7/20/2017    Partially empty sella and minimal chronic small vessel ischemic disease    Acute bilateral low back pain without sciatica 11/2/2016    SHARRON (acute kidney injury) ([facility] [address] 7/5/2017    Arthritis     back    Bipolar disorder ([facility] [address] 10/18/2008    CAD (coronary artery disease)     stent placed 6/8/20    Cancer [facility]) 2015    bilateral breast:s/p lumpectomy/radiation:under care care of breast specialist:Dr. Boone     Carotid stenosis, bilateral:<50%:per US 7/2016 7/15/2016    Carpal tunnel syndrome 10/18/2008    Cervical cancer screening 2014    Nml per pt'.  Coronary artery disease of native artery of native heart with stable angina pectoris ([facility] [address] 6/9/2020    DDD (degenerative disc disease), lumbar 7/18/2018    Depression     under care of pschiatrist:Dr. 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     Scoliosis     Spondylosis of lumbar region without myelopathy or radiculopathy 3/10/2017    Transient cerebral ischemia 07/15/2016    TIA:7/10/16    Unspecified cerebral artery occlusion with cerebral infarction     TIA       Surgical History:    Past Surgical History:   Procedure Laterality Date    BREAST LUMPECTOMY  2015    Bilateral:breast cancer    CARDIAC CATHETERIZATION  06/08/2020    Dr. Brenda Winn), DAYANA to Diag 1    COLONOSCOPY N/A 2/1/2021    COLONOSCOPY DIAGNOSTIC performed by Celestino Wagner MD at [place] [address]  2/3/2021    CT BONE MARROW BIOPSY 2/3/2021 Lisa Schultz MD [facility] CT SCAN    HYSTERECTOMY      Benign:no cervical cancer per pt'    KIDNEY REMOVAL      right    OTHER SURGICAL HISTORY Right     orif right ankle    TEMPORAL ARTERY BIOPSY Right 8/9/2021    RIGHT TEMPORAL

## 2021-09-29 NOTE — PROGRESS NOTES
Comprehensive Nutrition Assessment    Type and Reason for Visit:  Initial, Positive Nutrition Screen    Nutrition Recommendations/Plan:   1. Recommend carb control diet with 4 carb choices when able to consume PO  2. RD to add glucerna BID when medically feasible  3. Will follow up with nutrition education when appropriate   4. RD to order new hemoglobin A1C lab  5. Monitor nutrition adequacy, pertinent labs, bowel habits, wt changes, and clinical progress    Nutrition Assessment:  Positive nutrition screen for wt loss: Pt admitted with chest pain and hyperglycemia. Pt out of room for cardiac catheterization procedure today. Currently NPO for procedure. Recommend carb control diet when able to consume PO. 1 PO intake recorded of %. Pt with wt loss per EMR, pt was previously 128 lb on 12/10/20 and currently 110 lb (-14% wt change x 9 months). RD to add glucerna when able to consume PO. Will follow up with diabetes nutrition education when appropriate. Will continue to monitor. Malnutrition Assessment:  Malnutrition Status: At risk for malnutrition (Comment)    Context:  Acute Illness     Findings of the 6 clinical characteristics of malnutrition:  Energy Intake:  Mild decrease in energy intake (Comment)    Estimated Daily Nutrient Needs:  Energy (kcal):  0554-1315 kcals/day; Weight Used for Energy Requirements:  Current (50 kg)     Protein (g):  50-60 g/day; Weight Used for Protein Requirements:  Current (1-1.2 g/kg)        Method Used for Fluid Requirements:  1 ml/kcal      Nutrition Related Findings:  Labs reviewed. DM dx, on low dose corrective insulin. Hemoglobin A1C 15.5 in august, RD to order new one.       Wounds:  None       Current Nutrition Therapies:    Diet NPO Exceptions are: Sips of Water with Meds    Anthropometric Measures:  · Height: 5' 3\" (160 cm)  · Current Body Weight: 110 lb (49.9 kg)     · Ideal Body Weight: 115 lbs; % Ideal Body Weight 95.7 %   · BMI: 19.5  · BMI Categories: Normal Weight (BMI 18.5-24. 9)       Nutrition Diagnosis:   · Inadequate oral intake related to early satiety as evidenced by weight loss, poor intake prior to admission    Nutrition Interventions:   Food and/or Nutrient Delivery:  Continue Current Diet, Start Oral Nutrition Supplement  Nutrition Education/Counseling:  Education needed   Coordination of Nutrition Care:  Continue to monitor while inpatient    Goals:  Consume 50% or greater of 3 meals per day and ONS       Nutrition Monitoring and Evaluation:   Behavioral-Environmental Outcomes:  None Identified   Food/Nutrient Intake Outcomes:  Food and Nutrient Intake, Supplement Intake  Physical Signs/Symptoms Outcomes:  Biochemical Data, Constipation, Weight     Discharge Planning:    Continue current diet, Continue Oral Nutrition Supplement     Electronically signed by Chilango Tariq MS, RD, LD on 9/29/21 at 12:15 PM EDT    Contact: Office: [phone]; [address]: [phone]

## 2021-09-30 LAB
ESTIMATED AVERAGE GLUCOSE: 366.6 MG/DL
HBA1C MFR BLD: 14.4 %
POC ACT LR: 251 SEC

## 2021-10-05 PROBLEM — R69 SUSPECTED CONDITION: Status: ACTIVE | Noted: 2021-09-22

## 2021-10-06 ENCOUNTER — OFFICE VISIT (OUTPATIENT)
Dept: ENDOCRINOLOGY | Age: 61
End: 2021-10-06
Payer: MEDICAID

## 2021-10-06 VITALS
DIASTOLIC BLOOD PRESSURE: 62 MMHG | HEART RATE: 86 BPM | HEIGHT: 63 IN | OXYGEN SATURATION: 95 % | BODY MASS INDEX: 20.38 KG/M2 | WEIGHT: 115 LBS | SYSTOLIC BLOOD PRESSURE: 130 MMHG

## 2021-10-06 DIAGNOSIS — E11.29 TYPE 2 DIABETES MELLITUS WITH OTHER DIABETIC KIDNEY COMPLICATION (HCC): ICD-10-CM

## 2021-10-06 DIAGNOSIS — E11.21 TYPE 2 DIABETES MELLITUS WITH DIABETIC NEPHROPATHY, WITH LONG-TERM CURRENT USE OF INSULIN (HCC): ICD-10-CM

## 2021-10-06 DIAGNOSIS — Z79.4 TYPE 2 DIABETES MELLITUS WITH DIABETIC NEPHROPATHY, WITH LONG-TERM CURRENT USE OF INSULIN (HCC): ICD-10-CM

## 2021-10-06 PROCEDURE — 1036F TOBACCO NON-USER: CPT | Performed by: INTERNAL MEDICINE

## 2021-10-06 PROCEDURE — 3017F COLORECTAL CA SCREEN DOC REV: CPT | Performed by: INTERNAL MEDICINE

## 2021-10-06 PROCEDURE — G8427 DOCREV CUR MEDS BY ELIG CLIN: HCPCS | Performed by: INTERNAL MEDICINE

## 2021-10-06 PROCEDURE — G8484 FLU IMMUNIZE NO ADMIN: HCPCS | Performed by: INTERNAL MEDICINE

## 2021-10-06 PROCEDURE — 3046F HEMOGLOBIN A1C LEVEL >9.0%: CPT | Performed by: INTERNAL MEDICINE

## 2021-10-06 PROCEDURE — 99214 OFFICE O/P EST MOD 30 MIN: CPT | Performed by: INTERNAL MEDICINE

## 2021-10-06 PROCEDURE — G8420 CALC BMI NORM PARAMETERS: HCPCS | Performed by: INTERNAL MEDICINE

## 2021-10-06 PROCEDURE — 2022F DILAT RTA XM EVC RTNOPTHY: CPT | Performed by: INTERNAL MEDICINE

## 2021-10-06 NOTE — PROGRESS NOTES
Patient ID:   Kaitlin Carrillo is a 57 y.o. female  Chief Complaint:   Kaitlin Carrillo presents for an evaluation of Type 2 Diabetes Mellitus , Hyperlipidemia and hypertension. Subjective:   Type 2 Diabetes Mellitus diagnosed around 2010  On insulin since 2012  Previous regimen:Taking Admelog 15 units tidac and 5 units for snacks. Basaglar 40 units once a day at night. VGO stopped due to hypoglycemias     Multiple hospitalizations in 2021 due to weakness, anemia   She is recently released from a nursing home     Not taking:   Synjardy 12.5-1000mg, two tabs in am. Last pick ups: Aug 2020 and Feb 2021. Victoza 1.8 mg daily in AM  - stopped as well     Admits making poor dietary choices, trying to cut down fried. Lantus 26 units daily in am.  Glimepiride 2 mg with breakfast or first meal of the day   Novolog SSI as needed      Gained 5 lbs since last visit     Checks blood sugars 2-3 times per day. Reportedly 98 - 216 (down from 106-400)   AM:     Lunch:   Supper:    HS:        Hypoglycemias: Once in last 4 weeks      Meals: 3, dinner is big. Snacks (jellos, fruits, pretzels) after every meal because she is hungry. Exercise: None    Denies chest pain, exertional dyspnea. Family history of CAD: Both parents  Denies smoking/ alcohol.         The following portions of the patient's history were reviewed and updated as appropriate:       Family History   Problem Relation Age of Onset    Cancer Mother         breast    Cancer Father     Heart Failure Neg Hx     High Cholesterol Neg Hx     Hypertension Neg Hx     Migraines Neg Hx     Rashes/Skin Problems Neg Hx     Seizures Neg Hx     Stroke Neg Hx     Thyroid Disease Neg Hx     Diabetes Neg Hx          Social History     Socioeconomic History    Marital status:      Spouse name: Not on file    Number of children: 1    Years of education: Not on file    Highest education level: Not on file   Occupational History    Occupation:    Tobacco Use    Smoking status: Former Smoker     Packs/day: 0.50     Years: 20.00     Pack years: 10.00     Types: Cigarettes, Cigars     Quit date: 7/3/2014     Years since quittin.2    Smokeless tobacco: Never Used   Vaping Use    Vaping Use: Never used   Substance and Sexual Activity    Alcohol use: No     Alcohol/week: 0.0 standard drinks    Drug use: No    Sexual activity: Not on file   Other Topics Concern    Not on file   Social History Narrative    Not on file     Social Determinants of Health     Financial Resource Strain:     Difficulty of Paying Living Expenses:    Food Insecurity:     Worried About Running Out of Food in the Last Year:     Ran Out of Food in the Last Year:    Transportation Needs:     Lack of Transportation (Medical):      Lack of Transportation (Non-Medical):    Physical Activity:     Days of Exercise per Week:     Minutes of Exercise per Session:    Stress:     Feeling of Stress :    Social Connections:     Frequency of Communication with Friends and Family:     Frequency of Social Gatherings with Friends and Family:     Attends [employer] Services:     Active Member of Clubs or Organizations:     Attends Club or Organization Meetings:     Marital Status:    Intimate Partner Violence:     Fear of Current or Ex-Partner:     Emotionally Abused:     Physically Abused:     Sexually Abused:        Past Medical History:   Diagnosis Date    Abnormal brain MRI 2017    Partially empty sella and minimal chronic small vessel ischemic disease    Acute bilateral low back pain without sciatica 2016    SHARRON (acute kidney injury) ([facility] 2017    Arthritis     back    Bipolar disorder ([facility] 10/18/2008    CAD (coronary artery disease)     stent placed 20    Cancer ([facility]     bilateral breast:s/p lumpectomy/radiation:under care care of breast specialist:Dr. Boone     Carotid stenosis, bilateral:<50%:per [place] 2016 7/15/2016    Carpal tunnel syndrome 10/18/2008    Cervical cancer screening 2014    Nml per pt'.  Coronary artery disease of native artery of native heart with stable angina pectoris ([facility] 6/9/2020    DDD (degenerative disc disease), lumbar 7/18/2018    Depression     under care of pschiatrist:Dr. 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     Scoliosis     Spondylosis of lumbar region without myelopathy or radiculopathy 3/10/2017    Transient cerebral ischemia 07/15/2016    TIA:7/10/16    Unspecified cerebral artery occlusion with cerebral infarction     TIA       Past Surgical History:   Procedure Laterality Date    BREAST LUMPECTOMY  2015    Bilateral:breast cancer    CARDIAC CATHETERIZATION  06/08/2020    Dr. Mechelle Rodriguez), DAYANA to Diag 1    COLONOSCOPY N/A 2/1/2021    COLONOSCOPY DIAGNOSTIC performed by Pedro Luis Oviedo MD at [place] [id number]  2/3/2021    CT BONE MARROW BIOPSY 2/3/2021 Rudy Mauricio MD [place] [facility] SCAN    HYSTERECTOMY      Benign:no cervical cancer per pt'    KIDNEY REMOVAL      right    OTHER SURGICAL HISTORY Right     orif right ankle    TEMPORAL ARTERY BIOPSY Right 8/9/2021    RIGHT TEMPORAL ARTERY BIOPSY LIGATION performed by Bishop Rivas MD at [address] N/A 1/29/2021    EGD BIOPSY performed by Pedro Luis Oviedo MD at [address] (KLONOPIN) 0.5 MG tablet, Take 0.5 mg by mouth 3 times daily as needed. , Disp: , Rfl:     ticagrelor (BRILINTA) 90 MG TABS tablet, Take 1 tablet by mouth 2 times daily, Disp: 60 tablet, Rfl: 1    calcium carbonate-vitamin D (CALTRATE) 600-400 MG-UNIT TABS per tab, Take 1 tablet by mouth daily , Disp: , Rfl:     aspirin 81 MG tablet, Take 81 mg by mouth daily, Disp: , Rfl:     traZODone (DESYREL) 100 MG tablet, Take 100 mg by mouth nightly, Disp: , Rfl:     gabapentin (NEURONTIN) 600 MG tablet, Take 0.5 tablets by mouth 2 times daily for 3 days. (Patient taking differently: Take 600 mg by mouth 3 times daily. ), Disp: 90 tablet, Rfl: 3      Review of Systems:    Constitutional: Negative for fever, chills, and unexpected weight change. HENT: Negative for congestion, ear pain, rhinorrhea,  sore throat and trouble swallowing. Eyes: Negative for photophobia, redness, itching. Respiratory: Negative for cough, shortness of breath and sputum. Cardiovascular: Negative for chest pain, palpitations and leg swelling. Gastrointestinal: Negative for nausea, vomiting, abdominal pain, diarrhea, constipation. Endocrine: Negative for cold intolerance, heat intolerance, polydipsia, polyphagia and polyuria. Genitourinary: Negative for dysuria, urgency, frequency, hematuria and flank pain. Musculoskeletal: Negative for myalgias, back pain, arthralgias and neck pain. Skin/Nail: Negative for rash, itching. Normal nails. Neurological: Negative for seizures, weakness, light-headedness, numbness and headaches. Hematological/ Lymph nodes: Negative for adenopathy. Does not bruise/bleed easily. Psychiatric/Behavioral: Negative for suicidal ideas, depression, anxiety, sleep disturbance and decreased concentration.           Objective:   Physical Exam:  /62 (Site: Left Upper Arm, Position: Sitting, Cuff Size: Medium Adult)   Pulse 86   Ht 5' 3\" (1.6 m)   Wt 115 lb (52.2 kg)   SpO2 95%   BMI 20.37 kg/m² Constitutional: Patient is oriented to person, place, and time. Patient appears well-developed and well-nourished. HENT:               NZJI: Normocephalic and atraumatic.                Eyes: Conjunctivae and EOM are normal.                Neck: Normal range of motion. Cardiovascular: Normal rate, regular rhythm and normal heart sounds.  heart murmur present. Pulmonary/Chest: Effort normal and breath sounds normal.      Neurological: Patient is alert and oriented to person, place, and time. Skin: Skin is warm and dry. Psychiatric: Patient has a normal mood and affect.  Patient behavior is normal.     Lab Review:    Admission on 09/27/2021, Discharged on 09/29/2021   Component Date Value Ref Range Status    Ventricular Rate 09/27/2021 62  BPM Final    Atrial Rate 09/27/2021 62  BPM Final    P-R Interval 09/27/2021 150  ms Final    QRS Duration 09/27/2021 78  ms Final    Q-T Interval 09/27/2021 418  ms Final    QTc Calculation (Bazett) 09/27/2021 424  ms Final    P Axis 09/27/2021 57  degrees Final    R Axis 09/27/2021 -17  degrees Final    T Axis 09/27/2021 39  degrees Final    Diagnosis 09/27/2021 Normal sinus rhythmNormal ECGWhen compared with ECG of 08-AUG-2021 14:40,No significant change was foundConfirmed by Anjana Hua MD, Hiral Fair ([id number]) on 9/28/2021 7:28:41 PM   Final    WBC 09/27/2021 5.1  4.0 - 11.0 K/uL Final    RBC 09/27/2021 3.68* 4.00 - 5.20 M/uL Final    Hemoglobin 09/27/2021 10.6* 12.0 - 16.0 g/dL Final    Hematocrit 09/27/2021 32.2* 36.0 - 48.0 % Final    MCV 09/27/2021 87.4  80.0 - 100.0 fL Final    MCH 09/27/2021 28.8  26.0 - 34.0 pg Final    MCHC 09/27/2021 32.9  31.0 - 36.0 g/dL Final    RDW 09/27/2021 13.0  12.4 - 15.4 % Final    Platelets 09/28/3225 173  135 - 450 K/uL Final    MPV 09/27/2021 8.8  5.0 - 10.5 fL Final    Neutrophils % 09/27/2021 57.0  % Final    Lymphocytes % 09/27/2021 35.7  % Final    Monocytes % 09/27/2021 5.8  % Final    Eosinophils % 09/27/2021 50* 10 - 40 U/L Final    AST 09/27/2021 31  15 - 37 U/L Final    Globulin 09/27/2021 3.8  g/dL Final    Magnesium 09/27/2021 2.30  1.80 - 2.40 mg/dL Final    POC Glucose 09/27/2021 452* 70 - 99 mg/dl Final    Performed on 09/27/2021 ACCU-CHEK   Final    Color, UA 09/27/2021 Straw  Straw/Yellow Final    Clarity, UA 09/27/2021 SL CLOUDY* Clear Final    Glucose, Ur 09/27/2021 >=1000* Negative mg/dL Final    Bilirubin Urine 09/27/2021 Negative  Negative Final    Ketones, Urine 09/27/2021 Negative  Negative mg/dL Final    Specific [place], UA 09/27/2021 1.010  1.005 - 1.030 Final    Blood, Urine 09/27/2021 TRACE-INTACT* Negative Final    pH, UA 09/27/2021 5.5  5.0 - 8.0 Final    Protein, UA 09/27/2021 Negative  Negative mg/dL Final    Urobilinogen, Urine 09/27/2021 0.2  <2.0 E.U./dL Final    Nitrite, Urine 09/27/2021 Negative  Negative Final    Leukocyte Esterase, Urine 09/27/2021 Negative  Negative Final    Microscopic Examination 09/27/2021 YES   Final    Urine Type 09/27/2021 NotGiven   Final    Troponin 09/28/2021 <0.01  <0.01 ng/mL Final    WBC, UA 09/27/2021 6-9* 0 - 5 /HPF Final    RBC, UA 09/27/2021 3-4  0 - 4 /HPF Final    Epithelial Cells, UA 09/27/2021 2-5  0 - 5 /HPF Final    Bacteria, UA 09/27/2021 Rare* None Seen /HPF Final    Amorphous, UA 09/27/2021 Rare  /HPF Final    POC Glucose 09/28/2021 322* 70 - 99 mg/dl Final    Performed on 09/28/2021 ACCU-CHEK   Final    POC Glucose 09/28/2021 133* 70 - 99 mg/dl Final    Performed on 09/28/2021 ACCU-CHEK   Final    Occult Blood Screening 09/28/2021    Final                    Value:Result: Negative  Normal range: Negative      SARS-CoV-2, NAAT 09/28/2021 Not Detected  Not Detected Final    POC Glucose 09/28/2021 154* 70 - 99 mg/dl Final    Performed on 09/28/2021 ACCU-CHEK   Final    POC Glucose 09/28/2021 157* 70 - 99 mg/dl Final    Performed on 09/28/2021 ACCU-CHEK   Final    POC Glucose 09/28/2021 176* 70 - 99 mg/dl Final    Performed on 09/28/2021 ACCU-CHEK   Final    Sodium 09/29/2021 139  136 - 145 mmol/L Final    Potassium reflex Magnesium 09/29/2021 3.6  3.5 - 5.1 mmol/L Final    Chloride 09/29/2021 103  99 - 110 mmol/L Final    CO2 09/29/2021 26  21 - 32 mmol/L Final    Anion Gap 09/29/2021 10  3 - 16 Final    Glucose 09/29/2021 98  70 - 99 mg/dL Final    BUN 09/29/2021 13  7 - 20 mg/dL Final    CREATININE 09/29/2021 1.2  0.6 - 1.2 mg/dL Final    GFR Non- 09/29/2021 45* >60 Final    GFR  09/29/2021 55* >60 Final    Calcium 09/29/2021 9.5  8.3 - 10.6 mg/dL Final    Total Protein 09/29/2021 7.0  6.4 - 8.2 g/dL Final    Albumin 09/29/2021 3.8  3.4 - 5.0 g/dL Final    Albumin/Globulin Ratio 09/29/2021 1.2  1.1 - 2.2 Final    Total Bilirubin 09/29/2021 0.6  0.0 - 1.0 mg/dL Final    Alkaline Phosphatase 09/29/2021 138* 40 - 129 U/L Final    ALT 09/29/2021 39  10 - 40 U/L Final    AST 09/29/2021 27  15 - 37 U/L Final    Globulin 09/29/2021 3.2  g/dL Final    WBC 09/29/2021 5.4  4.0 - 11.0 K/uL Final    RBC 09/29/2021 3.83* 4.00 - 5.20 M/uL Final    Hemoglobin 09/29/2021 10.9* 12.0 - 16.0 g/dL Final    Hematocrit 09/29/2021 33.7* 36.0 - 48.0 % Final    MCV 09/29/2021 88.1  80.0 - 100.0 fL Final    MCH 09/29/2021 28.5  26.0 - 34.0 pg Final    MCHC 09/29/2021 32.3  31.0 - 36.0 g/dL Final    RDW 09/29/2021 13.3  12.4 - 15.4 % Final    Platelets 11/87/8214 161  135 - 450 K/uL Final    MPV 09/29/2021 8.7  5.0 - 10.5 fL Final    Neutrophils % 09/29/2021 37.6  % Final    Lymphocytes % 09/29/2021 50.9  % Final    Monocytes % 09/29/2021 9.6  % Final    Eosinophils % 09/29/2021 1.5  % Final    Basophils % 09/29/2021 0.4  % Final    Neutrophils Absolute 09/29/2021 2.0  1.7 - 7.7 K/uL Final    Lymphocytes Absolute 09/29/2021 2.7  1.0 - 5.1 K/uL Final    Monocytes Absolute 09/29/2021 0.5  0.0 - 1.3 K/uL Final    Eosinophils Absolute 09/29/2021 0.1  0.0 - 0.6 K/uL Final    Basophils Absolute 09/29/2021 0.0  0.0 - 0.2 K/uL Final    POC Glucose 09/28/2021 480* 70 - 99 mg/dl Final    Performed on 09/28/2021 ACCU-CHEK   Final    POC Glucose 09/28/2021 337* 70 - 99 mg/dl Final    Performed on 09/28/2021 ACCU-CHEK   Final    POC Glucose 09/29/2021 80  70 - 99 mg/dl Final    Performed on 09/29/2021 ACCU-CHEK   Final    POC Glucose 09/29/2021 132* 70 - 99 mg/dl Final    Performed on 09/29/2021 ACCU-CHEK   Final    Hemoglobin A1C 09/29/2021 14.4  See comment % Final    eAG 09/29/2021 366.6  mg/dL Final    POC Glucose 09/29/2021 414* 70 - 99 mg/dl Final    Performed on 09/29/2021 ACCU-CHEK   Final    POC ACT LR 09/29/2021 251  Not Established sec Final   No results displayed because visit has over 200 results. No results displayed because visit has over 200 results.       Admission on 05/18/2021, Discharged on 05/20/2021   Component Date Value Ref Range Status    Ventricular Rate 05/18/2021 59  BPM Final    Atrial Rate 05/18/2021 59  BPM Final    P-R Interval 05/18/2021 160  ms Final    QRS Duration 05/18/2021 82  ms Final    Q-T Interval 05/18/2021 454  ms Final    QTc Calculation (Bazett) 05/18/2021 449  ms Final    P Axis 05/18/2021 45  degrees Final    R Axis 05/18/2021 -24  degrees Final    T Axis 05/18/2021 59  degrees Final    Diagnosis 05/18/2021 Sinus bradycardiaOtherwise normal ECGWhen compared with ECG of 05-MAR-2021 20:33,No significant change was foundConfirmed by Eriberto Tabor ([id number]) on 5/20/2021 3:58:16 PM   Final    WBC 05/18/2021 4.6  4.0 - 11.0 K/uL Final    RBC 05/18/2021 3.84* 4.00 - 5.20 M/uL Final    Hemoglobin 05/18/2021 10.8* 12.0 - 16.0 g/dL Final    Hematocrit 05/18/2021 34.8* 36.0 - 48.0 % Final    MCV 05/18/2021 90.6  80.0 - 100.0 fL Final    MCH 05/18/2021 28.1  26.0 - 34.0 pg Final    MCHC 05/18/2021 31.1  31.0 - 36.0 g/dL Final    RDW 05/18/2021 13.5  12.4 - 15.4 % Final    Platelets 55/02/2634 160  135 - 450 K/uL Final    MPV 05/18/2021 10.1  5.0 - 10.5 fL Final    Neutrophils % 05/18/2021 58.4  % Final    Lymphocytes % 05/18/2021 34.8  % Final    Monocytes % 05/18/2021 5.6  % Final    Eosinophils % 05/18/2021 0.9  % Final    Basophils % 05/18/2021 0.3  % Final    Neutrophils Absolute 05/18/2021 2.7  1.7 - 7.7 K/uL Final    Lymphocytes Absolute 05/18/2021 1.6  1.0 - 5.1 K/uL Final    Monocytes Absolute 05/18/2021 0.3  0.0 - 1.3 K/uL Final    Eosinophils Absolute 05/18/2021 0.0  0.0 - 0.6 K/uL Final    Basophils Absolute 05/18/2021 0.0  0.0 - 0.2 K/uL Final    Sodium 05/18/2021 129* 136 - 145 mmol/L Final    Potassium 05/18/2021 4.8  3.5 - 5.1 mmol/L Final    Chloride 05/18/2021 93* 99 - 110 mmol/L Final    CO2 05/18/2021 24  21 - 32 mmol/L Final    Anion Gap 05/18/2021 12  3 - 16 Final    Glucose 05/18/2021 839* 70 - 99 mg/dL Final    BUN 05/18/2021 13  7 - 20 mg/dL Final    CREATININE 05/18/2021 1.6* 0.6 - 1.2 mg/dL Final    GFR Non- 05/18/2021 33* >60 Final    GFR  05/18/2021 39* >60 Final    Calcium 05/18/2021 9.3  8.3 - 10.6 mg/dL Final    Total Protein 05/18/2021 7.6  6.4 - 8.2 g/dL Final    Albumin 05/18/2021 3.7  3.4 - 5.0 g/dL Final    Albumin/Globulin Ratio 05/18/2021 0.9* 1.1 - 2.2 Final    Total Bilirubin 05/18/2021 <0.2  0.0 - 1.0 mg/dL Final    Alkaline Phosphatase 05/18/2021 198* 40 - 129 U/L Final    ALT 05/18/2021 34  10 - 40 U/L Final    AST 05/18/2021 26  15 - 37 U/L Final    Globulin 05/18/2021 3.9  g/dL Final    Troponin 05/18/2021 <0.01  <0.01 ng/mL Final    pH, Kyle 05/18/2021 7.499* 7.350 - 7.450 Final    pCO2, Kyle 05/18/2021 31.9* 40.0 - 50.0 mmHg Final    pO2, Kyle 05/18/2021 132.9* 25 - 40 mmHg Final    HCO3, Venous 05/18/2021 24.3  23.0 - 29.0 mmol/L Final    Base Excess, Kyle 05/18/2021 1.5  -3.0 - 3.0 mmol/L Final    O2 Sat, Kyle 05/18/2021 99  Not Established % Final    Carboxyhemoglobin 05/18/2021 5.2* 0.0 - 1.5 % Final    MetHgb, Kyle 05/18/2021 0.4  <1.5 % Final    TC02 (Calc), Kyle 05/18/2021 25  Not Established mmol/L Final    O2 Content, Kyle 05/18/2021 14  Not Established VOL % Final    O2 Therapy 05/18/2021 Unknown   Final    Color, UA 05/18/2021 Yellow  Straw/Yellow Final    Clarity, UA 05/18/2021 Clear  Clear Final    Glucose, Ur 05/18/2021 >=1000* Negative mg/dL Final    Bilirubin Urine 05/18/2021 Negative  Negative Final    Ketones, Urine 05/18/2021 Negative  Negative mg/dL Final    Specific Gravity, UA 05/18/2021 1.010  1.005 - 1.030 Final    Blood, Urine 05/18/2021 Negative  Negative Final    pH, UA 05/18/2021 6.0  5.0 - 8.0 Final    Protein, UA 05/18/2021 Negative  Negative mg/dL Final    Urobilinogen, Urine 05/18/2021 0.2  <2.0 E.U./dL Final    Nitrite, Urine 05/18/2021 Negative  Negative Final    Leukocyte Esterase, Urine 05/18/2021 Negative  Negative Final    Microscopic Examination 05/18/2021 Not Indicated   Final    Urine Type 05/18/2021 NotGiven   Final    Beta-Hydroxybutyrate 05/18/2021 0.19  0.00 - 0.27 mmol/L Final    POC Glucose 05/18/2021 >600* 70 - 99 mg/dl Final    Performed on 05/18/2021 ACCU-CHEK   Final    Glucose 05/18/2021   mg/dL Final    QC OK? 05/18/2021 yes   Final    POC Glucose 05/18/2021 >600* 70 - 99 mg/dl Final    Performed on 05/18/2021 ACCU-CHEK   Final    Sodium 05/19/2021 134* 136 - 145 mmol/L Final    Potassium reflex Magnesium 05/19/2021 4.2  3.5 - 5.1 mmol/L Final    Chloride 05/19/2021 100  99 - 110 mmol/L Final    CO2 05/19/2021 22  21 - 32 mmol/L Final    Anion Gap 05/19/2021 12  3 - 16 Final    Glucose 05/19/2021 582* 70 - 99 mg/dL Final    BUN 05/19/2021 12  7 - 20 mg/dL Final    CREATININE 05/19/2021 1.2  0.6 - 1.2 mg/dL Final    GFR Non- 05/19/2021 45* >60 Final    GFR  05/19/2021 55* >60 Final    Calcium 05/19/2021 8.8  8.3 - 10.6 mg/dL Final    Sodium 05/19/2021 141  136 - 145 mmol/L Final    Potassium reflex Magnesium 05/19/2021 3.7  3.5 - 5.1 mmol/L Final    Chloride 05/19/2021 105  99 - 110 mmol/L Final    CO2 05/19/2021 30  21 - 32 mmol/L Final    Anion Gap 05/19/2021 6  3 - 16 Final    Glucose 05/19/2021 255* 70 - 99 mg/dL Final    BUN 05/19/2021 10  7 - 20 mg/dL Final    CREATININE 05/19/2021 1.0  0.6 - 1.2 mg/dL Final    GFR Non- 05/19/2021 56* >60 Final    GFR  05/19/2021 >60  >60 Final    Calcium 05/19/2021 9.4  8.3 - 10.6 mg/dL Final    Troponin 05/19/2021 <0.01  <0.01 ng/mL Final    Troponin 05/19/2021 <0.01  <0.01 ng/mL Final    POC Glucose 05/19/2021 338* 70 - 99 mg/dl Final    Performed on 05/19/2021 ACCU-CHEK   Final    POC Glucose 05/19/2021 198* 70 - 99 mg/dl Final    Performed on 05/19/2021 ACCU-CHEK   Final    POC Glucose 05/19/2021 149* 70 - 99 mg/dl Final    Performed on 05/19/2021 ACCU-CHEK   Final    Left Ventricular Ejection Fraction 05/20/2021 53   Final-Edited    LVEF MODALITY 05/20/2021 ECHO   Final-Edited    Left Ventricular Ejection Fraction 05/20/2021 36   Final-Edited    LVEF MODALITY 05/20/2021 Nuclear   Final-Edited    POC Glucose 05/19/2021 51* 70 - 99 mg/dl Final    Performed on 05/19/2021 ACCU-CHEK   Final    POC Glucose 05/19/2021 59* 70 - 99 mg/dl Final    Performed on 05/19/2021 ACCU-CHEK   Final    POC Glucose 05/19/2021 67* 70 - 99 mg/dl Final    Performed on 05/19/2021 ACCU-CHEK   Final    POC Glucose 05/19/2021 154* 70 - 99 mg/dl Final    Performed on 05/19/2021 ACCU-CHEK   Final    POC Glucose 05/20/2021 91  70 - 99 mg/dl Final    Performed on 05/20/2021 ACCU-CHEK   Final    POC Glucose 05/20/2021 75  70 - 99 mg/dl Final    Performed on 05/20/2021 ACCU-CHEK   Final    POC Glucose 05/20/2021 183* 70 - 99 mg/dl Final    Performed on 05/20/2021 ACCU-CHEK   Final    POC Glucose 05/20/2021 248* 70 - 99 mg/dl Final    Performed on 05/20/2021 ACCU-CHEK   Final   No results displayed because visit has over 200 results.       Admission on 02/08/2021, Discharged on 02/08/2021   Component Date Value Ref Range Status    Ventricular Rate 02/08/2021 68  BPM Final    Atrial Rate 02/08/2021 68  BPM Final    P-R Interval 02/08/2021 148  ms Final    QRS Duration 02/08/2021 72  ms Final    Q-T Interval 02/08/2021 408  ms Final    QTc Calculation (Bazett) 02/08/2021 433  ms Final    P Axis 02/08/2021 53  degrees Final    R Axis 02/08/2021 0  degrees Final    T Axis 02/08/2021 38  degrees Final    Diagnosis 02/08/2021 Normal sinus rhythmNormal ECGWhen compared with ECG of 26-JAN-2021 16:29,No significant change was foundConfirmed by Candido Sapp ([id number]) on 2/9/2021 2:43:04 PM   Final    WBC 02/08/2021 5.9  4.0 - 11.0 K/uL Final    RBC 02/08/2021 2.88* 4.00 - 5.20 M/uL Final    Hemoglobin 02/08/2021 8.7* 12.0 - 16.0 g/dL Final    Hematocrit 02/08/2021 27.2* 36.0 - 48.0 % Final    MCV 02/08/2021 94.3  80.0 - 100.0 fL Final    MCH 02/08/2021 30.2  26.0 - 34.0 pg Final    MCHC 02/08/2021 32.0  31.0 - 36.0 g/dL Final    RDW 02/08/2021 16.5* 12.4 - 15.4 % Final    Platelets 99/45/8456 245  135 - 450 K/uL Final    MPV 02/08/2021 9.1  5.0 - 10.5 fL Final    Neutrophils % 02/08/2021 75.6  % Final    Lymphocytes % 02/08/2021 16.9  % Final    Monocytes % 02/08/2021 6.6  % Final    Eosinophils % 02/08/2021 0.6  % Final    Basophils % 02/08/2021 0.3  % Final    Neutrophils Absolute 02/08/2021 4.4  1.7 - 7.7 K/uL Final    Lymphocytes Absolute 02/08/2021 1.0  1.0 - 5.1 K/uL Final    Monocytes Absolute 02/08/2021 0.4  0.0 - 1.3 K/uL Final    Eosinophils Absolute 02/08/2021 0.0  0.0 - 0.6 K/uL Final    Basophils Absolute 02/08/2021 0.0  0.0 - 0.2 K/uL Final    Sodium 02/08/2021 139  136 - 145 mmol/L Final    Potassium 02/08/2021 4.3  3.5 - 5.1 mmol/L Final    Chloride 02/08/2021 101  99 - 110 mmol/L Final    CO2 02/08/2021 28  21 - 32 mmol/L Final    Anion Gap 02/08/2021 10  3 - 16 Final    Glucose 02/08/2021 311* 70 - 99 mg/dL Final    BUN 02/08/2021 11  7 - 20 mg/dL Final    CREATININE 02/08/2021 1.1  0.6 - 1.2 mg/dL Final    GFR Non- 02/08/2021 50* >60 Final    GFR  02/08/2021 >60  >60 Final    Calcium 02/08/2021 8.6  8.3 - 10.6 mg/dL Final    Total Protein 02/08/2021 6.8  6.4 - 8.2 g/dL Final    Albumin 02/08/2021 3.4  3.4 - 5.0 g/dL Final    Albumin/Globulin Ratio 02/08/2021 1.0* 1.1 - 2.2 Final    Total Bilirubin 02/08/2021 0.3  0.0 - 1.0 mg/dL Final    Alkaline Phosphatase 02/08/2021 288* 40 - 129 U/L Final    ALT 02/08/2021 55* 10 - 40 U/L Final    AST 02/08/2021 56* 15 - 37 U/L Final    Globulin 02/08/2021 3.4  g/dL Final    Troponin 02/08/2021 <0.01  <0.01 ng/mL Final    Specimen Status 02/08/2021 [id number]   Final    Lipase 02/08/2021 5.0* 13.0 - 60.0 U/L Final    Troponin 02/08/2021 <0.01  <0.01 ng/mL Final    Ventricular Rate 02/08/2021 56  BPM Final    Atrial Rate 02/08/2021 56  BPM Final    P-R Interval 02/08/2021 164  ms Final    QRS Duration 02/08/2021 68  ms Final    Q-T Interval 02/08/2021 458  ms Final    QTc Calculation (Bazett) 02/08/2021 441  ms Final    P Axis 02/08/2021 62  degrees Final    R Axis 02/08/2021 -13  degrees Final    T Axis 02/08/2021 35  degrees Final    Diagnosis 02/08/2021 Sinus bradycardiaOtherwise normal ECGWhen compared with ECG of 08-FEB-2021 15:11,No significant change was foundConfirmed by Saida Proctor ([id number]) on 2/9/2021 2:43:10 PM   Final   No results displayed because visit has over 200 results.       Admission on 01/07/2021, Discharged on 01/07/2021   Component Date Value Ref Range Status    POC Glucose 01/07/2021 >600* 70 - 99 mg/dl Final    Performed on 01/07/2021 ACCU-CHEK   Final    WBC 01/07/2021 5.5  4.0 - 11.0 K/uL Final    RBC 01/07/2021 3.51* 4.00 - 5.20 M/uL Final    Hemoglobin 01/07/2021 9.8* 12.0 - 16.0 g/dL Final    Hematocrit 01/07/2021 32.9* 36.0 - 48.0 % Final    MCV 01/07/2021 93.9  80.0 - 100.0 fL Final    MCH 01/07/2021 28.0  26.0 - 34.0 pg Final    MCHC 01/07/2021 29.8* 31.0 - 36.0 g/dL Final    RDW 01/07/2021 14.8  12.4 - 15.4 % Final    Platelets 72/64/3460 217  135 - 450 K/uL Final    MPV 01/07/2021 10.1  5.0 - 10.5 fL Final    Neutrophils % 01/07/2021 69.4  % Final    Lymphocytes % 01/07/2021 22.7  % Final    Monocytes % 01/07/2021 7.1  % Final    Eosinophils % 01/07/2021 0.5  % Final    Basophils % 01/07/2021 0.3  % Final    Neutrophils Absolute 01/07/2021 3.8  1.7 - 7.7 K/uL Final    Lymphocytes Absolute 01/07/2021 1.3  1.0 - 5.1 K/uL Final    Monocytes Absolute 01/07/2021 0.4  0.0 - 1.3 K/uL Final    Eosinophils Absolute 01/07/2021 0.0  0.0 - 0.6 K/uL Final    Basophils Absolute 01/07/2021 0.0  0.0 - 0.2 K/uL Final    Sodium 01/07/2021 129* 136 - 145 mmol/L Final    Potassium reflex Magnesium 01/07/2021 4.5  3.5 - 5.1 mmol/L Final    Chloride 01/07/2021 93* 99 - 110 mmol/L Final    CO2 01/07/2021 20* 21 - 32 mmol/L Final    Anion Gap 01/07/2021 16  3 - 16 Final    Glucose 01/07/2021 621* 70 - 99 mg/dL Final    BUN 01/07/2021 21* 7 - 20 mg/dL Final    CREATININE 01/07/2021 1.1  0.6 - 1.2 mg/dL Final    GFR Non- 01/07/2021 50* >60 Final    GFR  01/07/2021 >60  >60 Final    Calcium 01/07/2021 9.0  8.3 - 10.6 mg/dL Final    Total Protein 01/07/2021 8.2  6.4 - 8.2 g/dL Final    Albumin 01/07/2021 3.5  3.4 - 5.0 g/dL Final    Albumin/Globulin Ratio 01/07/2021 0.7* 1.1 - 2.2 Final    Total Bilirubin 01/07/2021 0.3  0.0 - 1.0 mg/dL Final    Alkaline Phosphatase 01/07/2021 316* 40 - 129 U/L Final    ALT 01/07/2021 35  10 - 40 U/L Final    AST 01/07/2021 26  15 - 37 U/L Final    Globulin 01/07/2021 4.7  g/dL Final    Color, UA 01/07/2021 Straw  Straw/Yellow Final    Clarity, UA 01/07/2021 Clear  Clear Final    Glucose, Ur 01/07/2021 >=1000* Negative mg/dL Final    Bilirubin Urine 01/07/2021 Negative  Negative Final    Ketones, Urine 01/07/2021 Negative  Negative mg/dL Final    Specific Gravity, UA 01/07/2021 <=1.005  1.005 - 1.030 Final    Blood, Urine 01/07/2021 Negative  Negative Final    pH, UA 01/07/2021 5.0  5.0 - 8.0 Final    Protein, UA 01/07/2021 Negative  Negative mg/dL Final    Urobilinogen, Urine 01/07/2021 0.2  <2.0 E.U./dL Final    Nitrite, Urine 01/07/2021 Negative  Negative Final    Leukocyte Esterase, Urine 01/07/2021 Negative  Negative Final    Microscopic Examination 01/07/2021 Not Indicated   Final    Urine Type 01/07/2021 NotGiven   Final    Urine Reflex to Culture 01/07/2021 Not Indicated   Final    Magnesium 01/07/2021 2.00  1.80 - 2.40 mg/dL Final    Phosphorus 01/07/2021 3.3  2.5 - 4.9 mg/dL Final    Beta-Hydroxybutyrate 01/07/2021 1.39* 0.00 - 0.27 mmol/L Final    POC Glucose 01/07/2021 394* 70 - 99 mg/dl Final    Performed on 01/07/2021 ACCU-CHEK   Final   Admission on 11/17/2020, Discharged on 11/17/2020   Component Date Value Ref Range Status    POC Glucose 11/17/2020 >600* 70 - 99 mg/dl Final    Performed on 11/17/2020 ACCU-CHEK   Final    WBC 11/17/2020 5.8  4.0 - 11.0 K/uL Final    RBC 11/17/2020 3.03* 4.00 - 5.20 M/uL Final    Hemoglobin 11/17/2020 8.8* 12.0 - 16.0 g/dL Final    Hematocrit 11/17/2020 29.0* 36.0 - 48.0 % Final    MCV 11/17/2020 95.8  80.0 - 100.0 fL Final    MCH 11/17/2020 29.2  26.0 - 34.0 pg Final    MCHC 11/17/2020 30.4* 31.0 - 36.0 g/dL Final    RDW 11/17/2020 14.4  12.4 - 15.4 % Final    Platelets 75/27/9632 258  135 - 450 K/uL Final    MPV 11/17/2020 9.6  5.0 - 10.5 fL Final    Neutrophils % 11/17/2020 72.0  % Final    Lymphocytes % 11/17/2020 22.1  % Final    Monocytes % 11/17/2020 4.7  % Final    Eosinophils % 11/17/2020 0.5  % Final    Basophils % 11/17/2020 0.7  % Final    Neutrophils Absolute 11/17/2020 4.2  1.7 - 7.7 K/uL Final    Lymphocytes Absolute 11/17/2020 1.3  1.0 - 5.1 K/uL Final    Monocytes Absolute 11/17/2020 0.3  0.0 - 1.3 K/uL Final    Eosinophils Absolute 11/17/2020 0.0  0.0 - 0.6 K/uL Final    Basophils Absolute 11/17/2020 0.0  0.0 - 0.2 K/uL Final    Sodium 11/17/2020 129* 136 - 145 mmol/L Final    Potassium 11/17/2020 4.5  3.5 - 5.1 mmol/L Final    Chloride 11/17/2020 95* 99 - 110 mmol/L Final    CO2 11/17/2020 23  21 - 32 mmol/L Final    Anion Gap 11/17/2020 11  3 - 16 Final    Glucose 11/17/2020 700* 70 - 99 mg/dL Final    BUN 11/17/2020 11  7 - 20 mg/dL Final    CREATININE 11/17/2020 1.4* 0.6 - 1.2 mg/dL Final    GFR Non- 11/17/2020 38* >60 Final    GFR  11/17/2020 46* >60 Final    Calcium 11/17/2020 9.0  8.3 - 10.6 mg/dL Final    Total Protein 11/17/2020 7.3  6.4 - 8.2 g/dL Final    Albumin 11/17/2020 3.9  3.4 - 5.0 g/dL Final    Albumin/Globulin Ratio 11/17/2020 1.1  1.1 - 2.2 Final    Total Bilirubin 11/17/2020 <0.2  0.0 - 1.0 mg/dL Final    Alkaline Phosphatase 11/17/2020 314* 40 - 129 U/L Final    ALT 11/17/2020 42* 10 - 40 U/L Final    AST 11/17/2020 54* 15 - 37 U/L Final    Globulin 11/17/2020 3.4  g/dL Final    pH, [facility] 11/17/2020 7.314* 7.350 - 7.450 Final    pCO2, [facility] 11/17/2020 44.5  40.0 - 50.0 mmHg Final    pO2, [facility] 11/17/2020 47.4* 25 - 40 mmHg Final    HCO3, Venous 11/17/2020 22.1* 23.0 - 29.0 mmol/L Final    Base Excess, [facility] 11/17/2020 -3.9* -3.0 - 3.0 mmol/L Final    O2 Sat, [facility] 11/17/2020 82  Not Established % Final    Carboxyhemoglobin 11/17/2020 4.1* 0.0 - 1.5 % Final    MetHgb, Kyle 11/17/2020 0.2  <1.5 % Final    TC02 (Calc), Kyle 11/17/2020 24  Not Established mmol/L Final    O2 Content, Kyle 11/17/2020 11  Not Established VOL % Final    O2 Therapy 11/17/2020 Unknown   Final    Color, UA 11/17/2020 Yellow  Straw/Yellow Final    Clarity, UA 11/17/2020 Clear  Clear Final    Glucose, Ur 11/17/2020 >=1000* Negative mg/dL Final    Bilirubin Urine 11/17/2020 Negative  Negative Final    Ketones, Urine 11/17/2020 Negative  Negative mg/dL Final    Specific Gravity, UA 11/17/2020 1.010  1.005 - 1.030 Final    Blood, Urine 11/17/2020 Negative  Negative Final    pH, UA 11/17/2020 5.5  5.0 - 8.0 Final    Protein, UA 11/17/2020 Negative  Negative mg/dL Final    Urobilinogen, Urine 11/17/2020 0.2  <2.0 E.U./dL Final    Nitrite, Urine 11/17/2020 Negative  Negative Final    Leukocyte Esterase, Urine 11/17/2020 Negative  Negative Final    Microscopic Examination 11/17/2020 Not Indicated   Final    Urine Type 11/17/2020 NotGiven   Final    Glucose 11/17/2020 342  mg/dL Final    POC Glucose 11/17/2020 440* 70 - 99 mg/dl Final    Performed on 11/17/2020 ACCU-CHEK   Final    POC Glucose 11/17/2020 342* 70 - 99 mg/dl Final    Performed on 11/17/2020 ACCU-CHEK   Final   Admission on 11/11/2020, Discharged on 11/12/2020   Component Date Value Ref Range Status    Rapid Influenza A Ag 11/11/2020 Negative  Negative Final    Rapid Influenza B Ag 11/11/2020 Negative  Negative Final    Rapid Strep A Screen 11/11/2020 Negative  Negative Final    SARS-CoV-2, NAAT 11/11/2020 Not Detected  Not Detected Final    Strep A Culture 11/11/2020 Normal oral katherine, No Beta Strep isolated   Final    Glucose 11/12/2020 162  mg/dL Final    POC Glucose 11/11/2020 434* 70 - 99 mg/dl Final    Performed on 11/11/2020 ACCU-CHEK   Final    WBC 11/11/2020 7.6  4.0 - 11.0 K/uL Final    RBC 11/11/2020 3.65* 4.00 - 5.20 M/uL Final    Hemoglobin 11/11/2020 10.4* 12.0 - 16.0 g/dL Final    Hematocrit 11/11/2020 33.8* 36.0 - 48.0 % Final    MCV 11/11/2020 92.4  80.0 - 100.0 fL Final    MCH 11/11/2020 28.5  26.0 - 34.0 pg Final    MCHC 11/11/2020 30.9* 31.0 - 36.0 g/dL Final    RDW 11/11/2020 13.7  12.4 - 15.4 % Final    Platelets 62/79/5572 220  135 - 450 K/uL Final    MPV 11/11/2020 10.0  5.0 - 10.5 fL Final    Neutrophils % 11/11/2020 68.6  % Final    Lymphocytes % 11/11/2020 22.6  % Final    Monocytes % 11/11/2020 8.0  % Final    Eosinophils % 11/11/2020 0.4  % Final    Basophils % 11/11/2020 0.4  % Final    Neutrophils Absolute 11/11/2020 5.2  1.7 - 7.7 K/uL Final    Lymphocytes Absolute 11/11/2020 1.7  1.0 - 5.1 K/uL Final    Monocytes Absolute 11/11/2020 0.6  0.0 - 1.3 K/uL Final    Eosinophils Absolute 11/11/2020 0.0  0.0 - 0.6 K/uL Final    Basophils Absolute 11/11/2020 0.0  0.0 - 0.2 K/uL Final    Sodium 11/11/2020 134* 136 - 145 mmol/L Final    Potassium reflex Magnesium 11/11/2020 3.9  3.5 - 5.1 mmol/L Final    Chloride 11/11/2020 97* 99 - 110 mmol/L Final    CO2 11/11/2020 22  21 - 32 mmol/L Final    Anion Gap 11/11/2020 15  3 - 16 Final    Glucose 11/11/2020 377* 70 - 99 mg/dL Final    BUN 11/11/2020 16  7 - 20 mg/dL Final    CREATININE 11/11/2020 1.2  0.6 - 1.2 mg/dL Final    GFR Non- 11/11/2020 46* >60 Final    GFR  11/11/2020 55* >60 Final    Calcium 11/11/2020 9.8  8.3 - 10.6 mg/dL Final    Total Protein 11/11/2020 7.9  6.4 - 8.2 g/dL Final    Albumin 11/11/2020 3.3* 3.4 - 5.0 g/dL Final    Albumin/Globulin Ratio 11/11/2020 0.7* 1.1 - 2.2 Final    Total Bilirubin 11/11/2020 0.4  0.0 - 1.0 mg/dL Final    Alkaline Phosphatase 11/11/2020 201* 40 - 129 U/L Final    ALT 11/11/2020 28  10 - 40 U/L Final    AST 11/11/2020 18  15 - 37 U/L Final    Globulin 11/11/2020 4.6  g/dL Final    Color, UA 11/11/2020 Straw  Straw/Yellow Final    Clarity, UA 11/11/2020 SL CLOUDY* Clear Final    Glucose, Ur 11/11/2020 >=1000* Negative mg/dL Final    Bilirubin Urine 11/11/2020 Negative  Negative Final    Ketones, Urine 11/11/2020 TRACE* Negative mg/dL Final    Specific [place], UA 11/11/2020 <=1.005  1.005 - 1.030 Final    Blood, Urine 11/11/2020 Negative  Negative Final    pH, UA 11/11/2020 5.5  5.0 - 8.0 Final    Protein, UA 11/11/2020 Negative  Negative mg/dL Final    Urobilinogen, Urine 11/11/2020 0.2  <2.0 E.U./dL Final    Nitrite, Urine 11/11/2020 POSITIVE* Negative Final    Leukocyte Esterase, Urine 11/11/2020 Negative Negative Final    Microscopic Examination 11/11/2020 YES   Final    Urine Type 11/11/2020 NotGiven   Final    pH, Kyle 11/11/2020 7.432  7.350 - 7.450 Final    pCO2, Kyle 11/11/2020 35.5* 40.0 - 50.0 mmHg Final    pO2, Kyle 11/11/2020 177.3* 25 - 40 mmHg Final    HCO3, Venous 11/11/2020 23.1  23.0 - 29.0 mmol/L Final    Base Excess, Kyle 11/11/2020 -0.7  -3.0 - 3.0 mmol/L Final    O2 Sat, Kyle 11/11/2020 98  Not Established % Final    Carboxyhemoglobin 11/11/2020 1.6* 0.0 - 1.5 % Final    MetHgb, Kyle 11/11/2020 0.0  <1.5 % Final    TC02 (Calc), Kyle 11/11/2020 24  Not Established mmol/L Final    O2 Content, Kyle 11/11/2020 16  Not Established VOL % Final    O2 Therapy 11/11/2020 Unknown   Final    WBC, UA 11/11/2020 21-50* 0 - 5 /HPF Final    RBC, UA 11/11/2020 None seen  0 - 4 /HPF Final    Epithelial Cells, UA 11/11/2020 2-5  0 - 5 /HPF Final    Bacteria, UA 11/11/2020 2+* None Seen /HPF Final    POC Glucose 11/12/2020 162* 70 - 99 mg/dl Final    Performed on 11/12/2020 ACCU-CHEK   Final   There may be more visits with results that are not included. Assessment and Plan     Navya Lipscomb was seen today for diabetes. Diagnoses and all orders for this visit:    Uncontrolled type 2 diabetes mellitus with microalbuminuria, with long-term current use of insulin ([address]    Type 2 diabetes mellitus with other diabetic kidney complication ([address]    Type 2 diabetes mellitus with diabetic nephropathy, with long-term current use of insulin ([address]    Uncontrolled type 2 diabetes mellitus with diabetic nephropathy, with long-term current use of insulin ([address]          1: Type 2 DM complicated with nephropathy, neuropathy TIAs, carotid stenosis   Uncontrolled A1C 14.4% Sep 29th 2021 < 15.5%   < 15.3% <  9.3% (after transfusion) < 11.9% < 7.3% < 8.4%<  6.8% < 10.1%  << 8.1 <  8% < 11.7%    Cr 1.2, GFR 55 Oct 2020   A1C of <8 would be acceptable because of microvascular and macrovascular complications. Questionable adherence to medical plan, treatment adherence and diet plan   Ferreira Nayan not approved   Will keep Synjardy and Victoza off due to weight loss     A1C is still high   Need to bring meds on every visit    Bring meter every visit   Adherence to medications is questionable     Blood sugars are variable depending on the intake     Glimepiride 2 mg with breakfast or first meal of the day     Continue Lantus 26 units daily . Adherence addressed. Missing once a week/     Novolog SS                     With meals (during the day)   - at bedtime    < 150 ---     3 units                                 0 units  151-200 --  4 units                                 0 units  201-250 :    6 units                                 1 units  251-300:     7 units                                 2 units  301-350:     8 units                                 3 units  >350 :         9 units                                  4 units    Reiterated importance of taking care meds to control diabetes     All instructions provided in written. Check Blood sugars 4 times per day. Log them along with insulin and send them every 2 weeks. Call for blood sugars less than 60 or more than 400. Eye exam: Last exam in March 12th 2018, Needs an exam now. Foot exam:  March 2020    Deformity/amputation: absent  Skin lesions/pre-ulcerative calluses: absent  Edema: right- negative, left- negative  Sensory exam: Monofilament sensation: normal  Plus at least one of the following:   Pulses: normal,   Vibration (128 Hz):  Moderately decreased     Renal screen: High 68 Feb 2018, high 47 Jan 2019  > 40 June 2020     TSH screen: Feb 2018   Saw CDE in 2016 with Rd.     2: HTN   Controlled     3: Hyperlipidemia   LDL: 33, HDL: 39, TGs: 118 June 2020      Atorvastatin 10 mg daily     4: Unintentional weigh tloss - good appetite   TSH, FT4, am cortisol normal - Nov 2020    Following up with pcp     RTC in 3 months with logs   Blood work on Nov: A1C, CMP, MACr,        EDUCATION:   Greater than 50% of this follow-up visit was spent in general counseling regarding   obesity, diet, exercise, importance of adherence to insulin regime, recognition and treatment of hypo and hyperglycemia,  glucose logging, proper diabetes management, diabetic complications with poor management and the importance of glycemic control in order to avoid the complications of diabetes. Risks and potential complications of diabetes were reviewed with the patient. Diabetes health maintenance plan and follow-up were discussed and understood by the patient. We reviewed the importance of medication compliance and regular follow-up. Aggressive lifestyle modification was encouraged. Exercise Counselling: This patient is a candidate for regular physical exercise. Instructions to perform the following types of exercise:  Swimming or water aerobic exercise  Brisk walking  Playing tennis  Stationary bicycle or elliptical indoor  Low impact aerobic exercise    Instructions given to exercise for the following duration:  30 minutes a day for five-seven days per week.     Following instructions for being active throughout the day in addition to formal exercise:  Walk instead of drive whenever possible  Take the stairs instead of the elevator  Work in the garden  Park to the far end of the parking lot to add more walking steps to destination      Electronically signed by Xin Gomez MD on 10/6/2021 at 2:24 PM

## 2021-11-02 ENCOUNTER — APPOINTMENT (OUTPATIENT)
Dept: GENERAL RADIOLOGY | Age: 61
DRG: 351 | End: 2021-11-02
Payer: MEDICAID

## 2021-11-02 ENCOUNTER — HOSPITAL ENCOUNTER (INPATIENT)
Age: 61
LOS: 1 days | Discharge: HOME | DRG: 351 | End: 2021-11-03
Attending: EMERGENCY MEDICINE | Admitting: INTERNAL MEDICINE
Payer: MEDICAID

## 2021-11-02 DIAGNOSIS — R73.9 HYPERGLYCEMIA: ICD-10-CM

## 2021-11-02 DIAGNOSIS — R07.9 CHEST PAIN, UNSPECIFIED TYPE: Primary | ICD-10-CM

## 2021-11-02 LAB
A/G RATIO: 1 (ref 1.1–2.2)
ALBUMIN SERPL-MCNC: 3.6 G/DL (ref 3.4–5)
ALP BLD-CCNC: 154 U/L (ref 40–129)
ALT SERPL-CCNC: 67 U/L (ref 10–40)
ANION GAP SERPL CALCULATED.3IONS-SCNC: 12 MMOL/L (ref 3–16)
AST SERPL-CCNC: 43 U/L (ref 15–37)
BASE EXCESS VENOUS: -3.6 MMOL/L (ref -3–3)
BASOPHILS ABSOLUTE: 0 K/UL (ref 0–0.2)
BASOPHILS RELATIVE PERCENT: 0.3 %
BILIRUB SERPL-MCNC: 0.3 MG/DL (ref 0–1)
BILIRUBIN URINE: NEGATIVE
BLOOD, URINE: NEGATIVE
BUN BLDV-MCNC: 19 MG/DL (ref 7–20)
CALCIUM SERPL-MCNC: 9.2 MG/DL (ref 8.3–10.6)
CARBOXYHEMOGLOBIN: 2 % (ref 0–1.5)
CHLORIDE BLD-SCNC: 97 MMOL/L (ref 99–110)
CLARITY: CLEAR
CO2: 25 MMOL/L (ref 21–32)
COLOR: YELLOW
CREAT SERPL-MCNC: 1.1 MG/DL (ref 0.6–1.2)
EKG ATRIAL RATE: 78 BPM
EKG DIAGNOSIS: NORMAL
EKG P AXIS: 78 DEGREES
EKG P-R INTERVAL: 138 MS
EKG Q-T INTERVAL: 376 MS
EKG QRS DURATION: 74 MS
EKG QTC CALCULATION (BAZETT): 428 MS
EKG R AXIS: -40 DEGREES
EKG T AXIS: 72 DEGREES
EKG VENTRICULAR RATE: 78 BPM
EOSINOPHILS ABSOLUTE: 0 K/UL (ref 0–0.6)
EOSINOPHILS RELATIVE PERCENT: 0.8 %
GFR AFRICAN AMERICAN: >60
GFR NON-AFRICAN AMERICAN: 50
GLUCOSE BLD-MCNC: 250 MG/DL (ref 70–99)
GLUCOSE BLD-MCNC: 325 MG/DL (ref 70–99)
GLUCOSE BLD-MCNC: 360 MG/DL (ref 70–99)
GLUCOSE BLD-MCNC: 663 MG/DL (ref 70–99)
GLUCOSE URINE: >=1000 MG/DL
HCO3 VENOUS: 23.2 MMOL/L (ref 23–29)
HCT VFR BLD CALC: 33.4 % (ref 36–48)
HEMOGLOBIN: 10.6 G/DL (ref 12–16)
KETONES, URINE: NEGATIVE MG/DL
LACTIC ACID: 2.2 MMOL/L (ref 0.4–2)
LACTIC ACID: 2.8 MMOL/L (ref 0.4–2)
LEUKOCYTE ESTERASE, URINE: NEGATIVE
LYMPHOCYTES ABSOLUTE: 0.9 K/UL (ref 1–5.1)
LYMPHOCYTES RELATIVE PERCENT: 17.3 %
MCH RBC QN AUTO: 29.3 PG (ref 26–34)
MCHC RBC AUTO-ENTMCNC: 31.7 G/DL (ref 31–36)
MCV RBC AUTO: 92.5 FL (ref 80–100)
METHEMOGLOBIN VENOUS: 0.4 %
MICROSCOPIC EXAMINATION: ABNORMAL
MONOCYTES ABSOLUTE: 0.3 K/UL (ref 0–1.3)
MONOCYTES RELATIVE PERCENT: 6.1 %
NEUTROPHILS ABSOLUTE: 3.9 K/UL (ref 1.7–7.7)
NEUTROPHILS RELATIVE PERCENT: 75.5 %
NITRITE, URINE: NEGATIVE
O2 SAT, VEN: 80 %
O2 THERAPY: ABNORMAL
PCO2, VEN: 49.9 MMHG (ref 40–50)
PDW BLD-RTO: 12.2 % (ref 12.4–15.4)
PERFORMED ON: ABNORMAL
PH UA: 5.5 (ref 5–8)
PH VENOUS: 7.29 (ref 7.35–7.45)
PLATELET # BLD: 140 K/UL (ref 135–450)
PMV BLD AUTO: 8.7 FL (ref 5–10.5)
PO2, VEN: 46.8 MMHG (ref 25–40)
POTASSIUM SERPL-SCNC: 4.8 MMOL/L (ref 3.5–5.1)
PROTEIN UA: NEGATIVE MG/DL
RBC # BLD: 3.61 M/UL (ref 4–5.2)
SODIUM BLD-SCNC: 134 MMOL/L (ref 136–145)
SPECIFIC GRAVITY UA: 1.01 (ref 1–1.03)
TCO2 CALC VENOUS: 25 MMOL/L
TOTAL PROTEIN: 7.2 G/DL (ref 6.4–8.2)
TROPONIN: <0.01 NG/ML
URINE TYPE: ABNORMAL
UROBILINOGEN, URINE: 0.2 E.U./DL
WBC # BLD: 5.1 K/UL (ref 4–11)

## 2021-11-02 PROCEDURE — 2580000003 HC RX 258: Performed by: INTERNAL MEDICINE

## 2021-11-02 PROCEDURE — 84484 ASSAY OF TROPONIN QUANT: CPT

## 2021-11-02 PROCEDURE — G0378 HOSPITAL OBSERVATION PER HR: HCPCS

## 2021-11-02 PROCEDURE — 80053 COMPREHEN METABOLIC PANEL: CPT

## 2021-11-02 PROCEDURE — 83036 HEMOGLOBIN GLYCOSYLATED A1C: CPT

## 2021-11-02 PROCEDURE — 82803 BLOOD GASES ANY COMBINATION: CPT

## 2021-11-02 PROCEDURE — 96361 HYDRATE IV INFUSION ADD-ON: CPT

## 2021-11-02 PROCEDURE — 93010 ELECTROCARDIOGRAM REPORT: CPT | Performed by: INTERNAL MEDICINE

## 2021-11-02 PROCEDURE — 99285 EMERGENCY DEPT VISIT HI MDM: CPT

## 2021-11-02 PROCEDURE — 96372 THER/PROPH/DIAG INJ SC/IM: CPT

## 2021-11-02 PROCEDURE — 36415 COLL VENOUS BLD VENIPUNCTURE: CPT

## 2021-11-02 PROCEDURE — 1200000000 HC SEMI PRIVATE

## 2021-11-02 PROCEDURE — 93005 ELECTROCARDIOGRAM TRACING: CPT | Performed by: EMERGENCY MEDICINE

## 2021-11-02 PROCEDURE — 81003 URINALYSIS AUTO W/O SCOPE: CPT

## 2021-11-02 PROCEDURE — 85025 COMPLETE CBC W/AUTO DIFF WBC: CPT

## 2021-11-02 PROCEDURE — 6360000002 HC RX W HCPCS: Performed by: INTERNAL MEDICINE

## 2021-11-02 PROCEDURE — 2580000003 HC RX 258: Performed by: NURSE PRACTITIONER

## 2021-11-02 PROCEDURE — 87086 URINE CULTURE/COLONY COUNT: CPT

## 2021-11-02 PROCEDURE — 6370000000 HC RX 637 (ALT 250 FOR IP): Performed by: NURSE PRACTITIONER

## 2021-11-02 PROCEDURE — 6370000000 HC RX 637 (ALT 250 FOR IP): Performed by: INTERNAL MEDICINE

## 2021-11-02 PROCEDURE — 83605 ASSAY OF LACTIC ACID: CPT

## 2021-11-02 PROCEDURE — 71046 X-RAY EXAM CHEST 2 VIEWS: CPT

## 2021-11-02 RX ORDER — ONDANSETRON 2 MG/ML
4 INJECTION INTRAMUSCULAR; INTRAVENOUS EVERY 6 HOURS PRN
Status: DISCONTINUED | OUTPATIENT
Start: 2021-11-02 | End: 2021-11-03 | Stop reason: HOSPADM

## 2021-11-02 RX ORDER — PANTOPRAZOLE SODIUM 40 MG/1
40 TABLET, DELAYED RELEASE ORAL DAILY
Status: DISCONTINUED | OUTPATIENT
Start: 2021-11-02 | End: 2021-11-03 | Stop reason: HOSPADM

## 2021-11-02 RX ORDER — NICOTINE POLACRILEX 4 MG
15 LOZENGE BUCCAL PRN
Status: DISCONTINUED | OUTPATIENT
Start: 2021-11-02 | End: 2021-11-03 | Stop reason: HOSPADM

## 2021-11-02 RX ORDER — NITROGLYCERIN 0.4 MG/1
0.4 TABLET SUBLINGUAL EVERY 5 MIN PRN
Status: DISCONTINUED | OUTPATIENT
Start: 2021-11-02 | End: 2021-11-03 | Stop reason: HOSPADM

## 2021-11-02 RX ORDER — ACETAMINOPHEN 325 MG/1
650 TABLET ORAL EVERY 6 HOURS PRN
Status: DISCONTINUED | OUTPATIENT
Start: 2021-11-02 | End: 2021-11-03 | Stop reason: HOSPADM

## 2021-11-02 RX ORDER — KETOROLAC TROMETHAMINE 30 MG/ML
30 INJECTION, SOLUTION INTRAMUSCULAR; INTRAVENOUS ONCE
Status: DISCONTINUED | OUTPATIENT
Start: 2021-11-02 | End: 2021-11-03 | Stop reason: HOSPADM

## 2021-11-02 RX ORDER — ASPIRIN 81 MG/1
81 TABLET ORAL DAILY
Status: DISCONTINUED | OUTPATIENT
Start: 2021-11-02 | End: 2021-11-03 | Stop reason: HOSPADM

## 2021-11-02 RX ORDER — SODIUM CHLORIDE 0.9 % (FLUSH) 0.9 %
5-40 SYRINGE (ML) INJECTION PRN
Status: DISCONTINUED | OUTPATIENT
Start: 2021-11-02 | End: 2021-11-03 | Stop reason: HOSPADM

## 2021-11-02 RX ORDER — POLYETHYLENE GLYCOL 3350 17 G/17G
17 POWDER, FOR SOLUTION ORAL DAILY PRN
Status: DISCONTINUED | OUTPATIENT
Start: 2021-11-02 | End: 2021-11-03 | Stop reason: HOSPADM

## 2021-11-02 RX ORDER — LISINOPRIL 20 MG/1
20 TABLET ORAL DAILY
Status: DISCONTINUED | OUTPATIENT
Start: 2021-11-02 | End: 2021-11-03 | Stop reason: HOSPADM

## 2021-11-02 RX ORDER — SODIUM CHLORIDE 0.9 % (FLUSH) 0.9 %
5-40 SYRINGE (ML) INJECTION EVERY 12 HOURS SCHEDULED
Status: DISCONTINUED | OUTPATIENT
Start: 2021-11-02 | End: 2021-11-03 | Stop reason: HOSPADM

## 2021-11-02 RX ORDER — ATORVASTATIN CALCIUM 10 MG/1
20 TABLET, FILM COATED ORAL DAILY
Status: DISCONTINUED | OUTPATIENT
Start: 2021-11-02 | End: 2021-11-03 | Stop reason: HOSPADM

## 2021-11-02 RX ORDER — TRAZODONE HYDROCHLORIDE 50 MG/1
100 TABLET ORAL NIGHTLY
Status: DISCONTINUED | OUTPATIENT
Start: 2021-11-02 | End: 2021-11-03 | Stop reason: HOSPADM

## 2021-11-02 RX ORDER — PALIPERIDONE 3 MG/1
9 TABLET, EXTENDED RELEASE ORAL EVERY MORNING
Status: DISCONTINUED | OUTPATIENT
Start: 2021-11-03 | End: 2021-11-03 | Stop reason: HOSPADM

## 2021-11-02 RX ORDER — ONDANSETRON 4 MG/1
4 TABLET, ORALLY DISINTEGRATING ORAL EVERY 8 HOURS PRN
Status: DISCONTINUED | OUTPATIENT
Start: 2021-11-02 | End: 2021-11-03 | Stop reason: HOSPADM

## 2021-11-02 RX ORDER — GLIPIZIDE 5 MG/1
5 TABLET ORAL
Status: DISCONTINUED | OUTPATIENT
Start: 2021-11-03 | End: 2021-11-03 | Stop reason: HOSPADM

## 2021-11-02 RX ORDER — DEXTROSE MONOHYDRATE 50 MG/ML
100 INJECTION, SOLUTION INTRAVENOUS PRN
Status: DISCONTINUED | OUTPATIENT
Start: 2021-11-02 | End: 2021-11-03 | Stop reason: HOSPADM

## 2021-11-02 RX ORDER — 0.9 % SODIUM CHLORIDE 0.9 %
1000 INTRAVENOUS SOLUTION INTRAVENOUS ONCE
Status: COMPLETED | OUTPATIENT
Start: 2021-11-02 | End: 2021-11-02

## 2021-11-02 RX ORDER — GABAPENTIN 300 MG/1
300 CAPSULE ORAL 2 TIMES DAILY
Status: DISCONTINUED | OUTPATIENT
Start: 2021-11-02 | End: 2021-11-03 | Stop reason: HOSPADM

## 2021-11-02 RX ORDER — SODIUM CHLORIDE 9 MG/ML
25 INJECTION, SOLUTION INTRAVENOUS PRN
Status: DISCONTINUED | OUTPATIENT
Start: 2021-11-02 | End: 2021-11-03 | Stop reason: HOSPADM

## 2021-11-02 RX ORDER — DEXTROSE MONOHYDRATE 25 G/50ML
12.5 INJECTION, SOLUTION INTRAVENOUS PRN
Status: DISCONTINUED | OUTPATIENT
Start: 2021-11-02 | End: 2021-11-03 | Stop reason: HOSPADM

## 2021-11-02 RX ORDER — ACETAMINOPHEN 650 MG/1
650 SUPPOSITORY RECTAL EVERY 6 HOURS PRN
Status: DISCONTINUED | OUTPATIENT
Start: 2021-11-02 | End: 2021-11-03 | Stop reason: HOSPADM

## 2021-11-02 RX ORDER — INSULIN GLARGINE 100 [IU]/ML
35 INJECTION, SOLUTION SUBCUTANEOUS NIGHTLY
Status: DISCONTINUED | OUTPATIENT
Start: 2021-11-02 | End: 2021-11-03 | Stop reason: HOSPADM

## 2021-11-02 RX ORDER — SODIUM CHLORIDE 9 MG/ML
INJECTION, SOLUTION INTRAVENOUS CONTINUOUS
Status: DISCONTINUED | OUTPATIENT
Start: 2021-11-02 | End: 2021-11-03

## 2021-11-02 RX ORDER — OXYCODONE AND ACETAMINOPHEN 7.5; 325 MG/1; MG/1
1 TABLET ORAL EVERY 6 HOURS PRN
Status: DISCONTINUED | OUTPATIENT
Start: 2021-11-02 | End: 2021-11-03 | Stop reason: HOSPADM

## 2021-11-02 RX ADMIN — INSULIN GLARGINE 35 UNITS: 100 INJECTION, SOLUTION SUBCUTANEOUS at 21:32

## 2021-11-02 RX ADMIN — PANTOPRAZOLE SODIUM 40 MG: 40 TABLET, DELAYED RELEASE ORAL at 19:35

## 2021-11-02 RX ADMIN — TICAGRELOR 90 MG: 90 TABLET ORAL at 21:31

## 2021-11-02 RX ADMIN — ENOXAPARIN SODIUM 40 MG: 40 INJECTION SUBCUTANEOUS at 19:35

## 2021-11-02 RX ADMIN — INSULIN LISPRO 4 UNITS: 100 INJECTION, SOLUTION INTRAVENOUS; SUBCUTANEOUS at 21:33

## 2021-11-02 RX ADMIN — SODIUM CHLORIDE 1000 ML: 9 INJECTION, SOLUTION INTRAVENOUS at 14:52

## 2021-11-02 RX ADMIN — SODIUM CHLORIDE: 9 INJECTION, SOLUTION INTRAVENOUS at 23:28

## 2021-11-02 RX ADMIN — TRAZODONE HYDROCHLORIDE 100 MG: 50 TABLET ORAL at 21:31

## 2021-11-02 RX ADMIN — OXYCODONE AND ACETAMINOPHEN 1 TABLET: 7.5; 325 TABLET ORAL at 21:31

## 2021-11-02 RX ADMIN — ASPIRIN 81 MG: 81 TABLET, COATED ORAL at 19:35

## 2021-11-02 RX ADMIN — ATORVASTATIN CALCIUM 20 MG: 10 TABLET, FILM COATED ORAL at 19:35

## 2021-11-02 RX ADMIN — SODIUM CHLORIDE: 9 INJECTION, SOLUTION INTRAVENOUS at 18:16

## 2021-11-02 RX ADMIN — INSULIN HUMAN 10 UNITS: 100 INJECTION, SOLUTION PARENTERAL at 15:50

## 2021-11-02 RX ADMIN — SODIUM CHLORIDE 1000 ML: 9 INJECTION, SOLUTION INTRAVENOUS at 15:53

## 2021-11-02 RX ADMIN — LISINOPRIL 20 MG: 20 TABLET ORAL at 19:38

## 2021-11-02 RX ADMIN — INSULIN LISPRO 6 UNITS: 100 INJECTION, SOLUTION INTRAVENOUS; SUBCUTANEOUS at 19:38

## 2021-11-02 RX ADMIN — GABAPENTIN 300 MG: 300 CAPSULE ORAL at 21:31

## 2021-11-02 ASSESSMENT — PAIN SCALES - GENERAL
PAINLEVEL_OUTOF10: 8
PAINLEVEL_OUTOF10: 5

## 2021-11-02 ASSESSMENT — PAIN DESCRIPTION - LOCATION: LOCATION: CHEST

## 2021-11-02 NOTE — PROGRESS NOTES
Upon assignment of pt to the floor, writer noted that blood sugar was 663. Placed call to ED to request blood sugar recheck to ensure room assignment is appropriate.

## 2021-11-02 NOTE — PROGRESS NOTES

## 2021-11-02 NOTE — ED NOTES
Bed: 30  Expected date:   Expected time:   Means of arrival:   Comments:   [facility], RN  11/02/21 [id number]

## 2021-11-02 NOTE — ED PROVIDER NOTES
I independently performed a history and physical on Freda Muse. All diagnostic, treatment, and disposition decisions were made by myself in conjunction with the advanced practice provider. I have participated in the medical decision making and directed the treatment plan and disposition of the patient. For further details of [facility] emergency department encounter, please see the advanced practice provider's documentation. CHIEF COMPLAINT  Chief Complaint   Patient presents with    Chest Pain     midsternal chest pain started last night around 6pm. Patient took one nitro at home and EMS gave 324 ASA in route. Patient states not change in pain        Briefly, Freda Muse is a 57 y.o. female  who presents to the ED complaining of chest pain, polyuria polydipsia    FOCUSED PHYSICAL EXAMINATION  BP (!) 139/43   Pulse 83   Temp 98.1 °F (36.7 °C) (Oral)   Resp 17   Wt 116 lb (52.6 kg)   SpO2 97%   BMI 20.55 kg/m²      Focused physical examination:  General appearance:  Cooperative. No acute distress. Skin:  Warm. Dry. Eye:  Extraocular movements intact. Ears, nose, mouth and throat:  Oral mucosa dry  Neck:  Trachea midline. Heart:  Regular rate and rhythm  Perfusion:  intact  Respiratory:  Lungs clear to auscultation bilaterally. Respirations nonlabored. Abdominal:   Non distended. Nontender  Neurological:  Alert and oriented x 3. Moves all extremities spontaneously  Musculoskeletal:   Normal ROM, no deformities          Psychiatric:  Normal mood      EKG: Sinus rhythm rate of 78 bpm.  No ST elevation. Similar to prior from 9/27/2021    MDM: Patient presents emergency department today complaining of chest pain. Multiple previous admissions in the past.  Recently cleared by cardiology. EKG unremarkable. Troponin negative. Patient found to be profoundly hyperglycemic. Mildly acidotic but no anion gap.   Was given IV fluids and bolus dose of insulin will be admitted for glycemic control. Low suspicion for acute coronary syndrome PE or dissection at present    During the patient's ED course, the patient was given:  Medications   0.9 % sodium chloride bolus (1,000 mLs IntraVENous New Bag 11/2/21 1553)   0.9 % sodium chloride bolus (0 mLs IntraVENous Stopped 11/2/21 1554)   insulin regular (HUMULIN R;NOVOLIN R) injection 10 Units (10 Units SubCUTAneous Given 11/2/21 1550)        CLINICAL IMPRESSION  1. Chest pain, unspecified type    2. Hyperglycemia        DISPOSITION  Admission      This chart was created using Dragon dictation software. Efforts were made by me to ensure accuracy, however some errors may be present due to limitations of this technology.             Pipe Hopkins MD  11/02/21 [id number]

## 2021-11-02 NOTE — H&P
Hospital Medicine History & Physical      PCP: David Paty    Date of Admission: 11/2/2021    Date of Service: Pt seen/examined on 2021-11-02 and Admitted for observation    Chief Complaint: Chest pain      History Of Present Illness:      57 y.o. female who presented to [facility] with above complaint. She has been having chest pain for last 2 days. There is mid chest, very sharp pain does no radiation, currently five in intensity, intermittent, not associated with any  vomiting shortness of breath or food   Denies cough fever or change in mental status. She has a history of CAD stent and she had a cardiac catheterization last month and did not qualify for any intervention  Her blood sugar was very high in the emergency room. She she told that she takes her medication as prescribed but she does not check her blood sugars. She did not have excessive thirst or polyuria      Past Medical History:          Diagnosis Date    Abnormal brain MRI 7/20/2017    Partially empty sella and minimal chronic small vessel ischemic disease    Acute bilateral low back pain without sciatica 11/2/2016    SHARRON (acute kidney injury) ([facility] 7/5/2017    Arthritis     back    Bipolar disorder ([address] 10/18/2008    CAD (coronary artery disease)     stent placed 6/8/20    Cancer [facility]) 2015    bilateral breast:s/p lumpectomy/radiation:under care care of breast specialist:Dr. Boone     Carotid stenosis, bilateral:<50%:per US 7/2016 7/15/2016    Carpal tunnel syndrome 10/18/2008    Cervical cancer screening 2014    Nml per pt'.     Coronary artery disease of native artery of native heart with stable angina pectoris ([facility] 6/9/2020    DDD (degenerative disc disease), lumbar 7/18/2018    Depression     under care of pschiatrist:Dr. 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  Scoliosis     Spondylosis of lumbar region without myelopathy or radiculopathy 3/10/2017    Transient cerebral ischemia 07/15/2016    TIA:7/10/16    Unspecified cerebral artery occlusion with cerebral infarction     TIA       Past Surgical History:          Procedure Laterality Date    BREAST LUMPECTOMY  2015    Bilateral:breast cancer    CARDIAC CATHETERIZATION  06/08/2020    Dr. Eloisa Mcdonald), DAYANA to Diag 1    COLONOSCOPY N/A 2/1/2021    COLONOSCOPY DIAGNOSTIC performed by Luisa White MD at [place] [id number]  2/3/2021    CT BONE MARROW BIOPSY 2/3/2021 Bella Dan MD [id number] [place] [address] CT SCAN    HYSTERECTOMY      Benign:no cervical cancer per pt'    KIDNEY REMOVAL      right    OTHER SURGICAL HISTORY Right     orif right ankle    TEMPORAL ARTERY BIOPSY Right 8/9/2021    RIGHT TEMPORAL ARTERY BIOPSY LIGATION performed by Mayela Batista MD at [address] N/A 1/29/2021    EGD BIOPSY performed by Luisa White MD at [address] [place] [address]       Medications Prior to Admission:      Prior to Admission medications    Medication Sig Start Date End Date Taking? Authorizing Provider   nitroGLYCERIN (NITROSTAT) 0.4 MG SL tablet up to max of 3 total doses.  If no relief after 1 dose, call 911. 9/29/21   Venora Aase, MD   insulin lispro, 1 Unit Dial, (HUMALOG KWIKPEN) 100 UNIT/ML SOPN Inject 9 Units into the skin 3 times daily     Historical Provider, MD   glimepiride (AMARYL) 2 MG tablet 2 mg with breakfast or first meal of the day 8/17/21   Nechama Spurling, MD   fluticasone [facility]) 50 MCG/ACT nasal spray 1 spray by Each Nostril route daily 8/12/21   Lillian Golden MD   insulin glargine (LANTUS SOLOSTAR) 100 UNIT/ML injection pen Inject 35 Units into the skin nightly  Patient taking differently: Inject 26 Units into the skin every morning  8/11/21   Lillian Golden MD   TRUE METRIX BLOOD GLUCOSE TEST strip USE AS DIRECTED TO TEST 4 TIMES A DAY 7/19/21   Nechama Spurling, MD   VICTOZA 18 MG/3ML SOPN SC injection INJECT 1.8 MG UNDER THE SKIN ONCE DAILY 6/8/21   Historical Provider, MD   Blood Glucose Monitoring Suppl (TRUE METRIX METER) w/Device KIT Used to  check blood sugar 4 times eyad 6/9/21   Nechama Spurling, MD   gabapentin (NEURONTIN) 600 MG tablet Take 0.5 tablets by mouth 2 times daily for 3 days. Patient taking differently: Take 600 mg by mouth 3 times daily. 6/4/21 8/8/21  Francisco Zepeda MD   lisinopril (PRINIVIL;ZESTRIL) 40 MG tablet Take 0.5 tablets by mouth daily  Patient taking differently: Take 40 mg by mouth daily  6/4/21   Francisco Zepeda MD   atorvastatin (LIPITOR) 20 MG tablet Take 1 tablet by mouth daily 5/20/21   Baron Armenta MD   paliperidone (INVEGA) 9 MG extended release tablet Take 9 mg by mouth every morning  3/15/21   Historical Provider, MD   pantoprazole (PROTONIX) 40 MG tablet Take 40 mg by mouth daily  4/3/21   Historical Provider, MD   ferrous sulfate (IRON 325) 325 (65 Fe) MG tablet Take 325 mg by mouth daily (with breakfast)    Historical Provider, MD   oxyCODONE-acetaminophen (PERCOCET) 7.5-325 MG per tablet TAKE 1 TABLET BY MOUTH EVERY 6 (SIX) HOURS AS NEEDED FOR UP TO 28 DAYS. 3/11/21   Historical Provider, MD   clonazePAM (KLONOPIN) 0.5 MG tablet Take 0.5 mg by mouth 3 times daily as needed.  3/15/21   Historical Provider, MD   ticagrelor (BRILINTA) 90 MG TABS tablet Take 1 tablet by mouth 2 times daily 2/3/21   Almas Hairston MD   calcium carbonate-vitamin D (CALTRATE) 600-400 MG-UNIT TABS per tab Take 1 tablet by mouth daily  12/30/19   Historical Provider, MD   aspirin 81 MG tablet Take 81 mg by mouth daily    Historical Provider, MD   traZODone (DESYREL) 100 MG tablet Take 100 mg by mouth nightly    Historical Provider, MD       Allergies:  Morphine, Penicillins, Codeine, and Penicillin g    Social History:      The patient currently lives at home independent    TOBACCO:   reports that she quit smoking about 7 years ago. Her smoking use included cigarettes and cigars. She has a 10.00 pack-year smoking history. She has never used smokeless tobacco.  ETOH:   reports no history of alcohol use. E-Cigarettes/Vaping Use     Questions Responses    E-Cigarette/Vaping Use Never User    Start Date     Passive Exposure     Quit Date     Counseling Given     Comments             Family History:       Reviewed in detail and negative for DM, CAD, Cancer, CVA. Positive as follows:        Problem Relation Age of Onset    Cancer Mother         breast    Cancer Father     Heart Failure Neg Hx     High Cholesterol Neg Hx     Hypertension Neg Hx     Migraines Neg Hx     Rashes/Skin Problems Neg Hx     Seizures Neg Hx     Stroke Neg Hx     Thyroid Disease Neg Hx     Diabetes Neg Hx        REVIEW OF SYSTEMS COMPLETED:   Pertinent positives as noted in the HPI. All other systems reviewed and negative. PHYSICAL EXAM PERFORMED:    BP (!) 168/65   Pulse 55   Temp 98.1 °F (36.7 °C) (Oral)   Resp 14   Wt 116 lb (52.6 kg)   SpO2 100%   BMI 20.55 kg/m²     General appearance: Complain back pain and wanting pain meds, appears stated age and cooperative. HEENT:  Normal cephalic, atraumatic without obvious deformity. .  Extra ocular muscles intact. Conjunctivae/corneas clear. Neck: Supple, with full range of motion. No jugular venous distention. Trachea midline. Respiratory:  Normal respiratory effort.  Clear to auscultation, bilaterally without Rales/Wheezes/Rhonchi. Cardiovascular:  Regular rate and rhythm with normal S1/S2 without murmurs, rubs or gallops. Abdomen: Soft, non-tender, non-distended with normal bowel sounds. Musculoskeletal:  No clubbing, cyanosis or edema bilaterally. Full range of motion without deformity. Skin: Skin color, texture, turgor normal.  No rashes or lesions. Neurologic:  Neurovascularly intact without any focal sensory/motor deficits. Psychiatric:  Alert and oriented,   Capillary Refill: Brisk,3 seconds, normal  Peripheral Pulses: +2 palpable, equal bilaterally       Labs:     Recent Labs     11/02/21  1321   WBC 5.1   HGB 10.6*   HCT 33.4*        Recent Labs     11/02/21  1321   *   K 4.8   CL 97*   CO2 25   BUN 19   CREATININE 1.1   CALCIUM 9.2     Recent Labs     11/02/21  1321   AST 43*   ALT 67*   BILITOT 0.3   ALKPHOS 154*     No results for input(s): INR in the last 72 hours. Recent Labs     11/02/21  1321   TROPONINI <0.01       Urinalysis:      Lab Results   Component Value Date    NITRU Negative 11/02/2021    WBCUA 6-9 09/27/2021    BACTERIA Rare 09/27/2021    RBCUA 3-4 09/27/2021    BLOODU Negative 11/02/2021    SPECGRAV 1.015 11/02/2021    GLUCOSEU >=1000 11/02/2021    GLUCOSEU >=1000 mg/dL 06/07/2010       Radiology:     CXR: I have reviewed the CXR with the following interpretation:   EKG:  I have reviewed the EKG with the following interpretation:   Normal sinus rhythmLeft axis deviationAbnormal ECGWhen compared with ECG of 27-SEP-2021 19:47,No significant change was foundConfirmed by CELESTE GREER (    XR CHEST (2 VW)   Final Result   Findings of COPD without evidence of an acute superimposed cardiopulmonary   process.              ASSESSMENT:    Active Hospital Problems    Diagnosis Date Noted    Chest pain [R07.9] 06/05/2020         PLAN:  Chest pain given the history of CAD and stent rule out ACS-admit, telemetry, troponin, consider cardiology  She had left heart catheterization in September 2021  Cardiac catheterization on 9/29/21- Patent diagonal artery stent, mild to moderate residual CAD/ASHD, treat medically (continue aspirin, statin, lisinopril, no beta-blocker due to bradycardia). Diabetes-not well controlled-not in ketoacidosis, resume home medications monitor blood sugar with medium correction scale insulin, low-carb diet, IV fluids    Chronic back pain-sees pain specialist on Percocet 7.5/325 every 6 as needed     COPD-stable continue home inhalers    Anemia-no apparent blood loss monitor    Elevated liver function test-chronic outpatient follow-up    Mildly elevated lactic acid-no evidence of infection check with hydration        DVT Prophylaxis: Lovenox  Diet: Low carbohydrate, cardiac n.p.o. midnight  Code Status: Full code  PT/OT Eval Status:     Dispo -admitted Crist[facility]bal Le[facility] 5 telemetry observation       Steff Samuels MD    Thank you [facility] for the opportunity to be involved in this patient's care. If you have any questions or concerns please feel free to contact me at [phone].

## 2021-11-02 NOTE — ED PROVIDER NOTES
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     Scoliosis     Spondylosis of lumbar region without myelopathy or radiculopathy 3/10/2017    Transient cerebral ischemia 07/15/2016    TIA:7/10/16    Unspecified cerebral artery occlusion with cerebral infarction     TIA     Past Surgical History:   Procedure Laterality Date    BREAST LUMPECTOMY  2015    Bilateral:breast cancer    CARDIAC CATHETERIZATION  06/08/2020    Dr. Kaushal Watson), DAYANA to Diag 1    COLONOSCOPY N/A 2/1/2021    COLONOSCOPY DIAGNOSTIC performed by Inge Castillo MD at [place] [id number]  2/3/2021    CT BONE MARROW BIOPSY 2/3/2021 Duran Carlin MD [facility] CT SCAN    HYSTERECTOMY      Benign:no cervical cancer per pt'    KIDNEY REMOVAL      right    OTHER SURGICAL HISTORY Right     orif right ankle    TEMPORAL ARTERY BIOPSY Right 8/9/2021    RIGHT TEMPORAL ARTERY BIOPSY LIGATION performed by Renita Deshpande MD at [facility] ENDOSCOPY N/A 1/29/2021    EGD BIOPSY performed by Inge Castillo MD at [address] [place] [address]     Family History   Problem Relation Age of Onset    Cancer Mother         breast    Cancer Father     Heart Failure Neg Hx     High Cholesterol Neg Hx     Hypertension Neg Hx     Migraines Neg Hx     Rashes/Skin Problems Neg Hx     Seizures Neg Hx     Stroke Neg Hx     Thyroid Disease Neg Hx     Diabetes Neg Hx      Social History     Socioeconomic History    Marital status:      Spouse name: Not on file    Number of children: 1    Years of education: Not on file    Highest education level: Not on file   Occupational History    Occupation:    Tobacco Use    Smoking status: Former Smoker     Packs/day: 0.50     Years: 20.00     Pack years: 10.00     Types: Cigarettes, Cigars     Quit date: 7/3/2014     Years since quittin.3    Smokeless tobacco: Never Used   Vaping Use    Vaping Use: Never used   Substance and Sexual Activity    Alcohol use: No     Alcohol/week: 0.0 standard drinks    Drug use: No    Sexual activity: Not on file   Other Topics Concern    Not on file   Social History Narrative    Not on file     Social Determinants of Health     Financial Resource Strain:     Difficulty of Paying Living Expenses:    Food Insecurity:     Worried About [address] [employer] in the Last Year:     [address] in the Last Year:    Transportation Needs:     Lack of Transportation (Medical):  Lack of Transportation (Non-Medical):    Physical Activity:     Days of Exercise per Week:     Minutes of Exercise per Session:    Stress:     Feeling of Stress :    Social Connections:     Frequency of Communication with Friends and Family:     Frequency of Social Gatherings with Friends and Family:     Attends Yazidi Services:     Active Member of Clubs or Organizations:     Attends Club or Organization Meetings:     Marital Status:    Intimate Partner Violence:     Fear of Current or Ex-Partner:     Emotionally Abused:     Physically Abused:     Sexually Abused:      No current facility-administered medications for this encounter. Current Outpatient Medications   Medication Sig Dispense Refill    nitroGLYCERIN (NITROSTAT) 0.4 MG SL tablet up to max of 3 total doses.  If no relief after 1 dose, call 911. 25 tablet 0    insulin lispro, 1 Unit Dial, (HUMALOG KWIKPEN) 100 UNIT/ML SOPN Inject 9 Units into the skin 3 times daily       glimepiride (AMARYL) 2 MG tablet 2 mg with breakfast or first meal of the day 30 tablet 5    fluticasone (FLONASE) 50 MCG/ACT nasal spray 1 spray by Each Nostril route daily 1 Bottle 1    insulin glargine (LANTUS SOLOSTAR) 100 UNIT/ML injection pen Inject 35 Units into the skin nightly (Patient taking differently: Inject 26 Units into the skin every morning ) 1 pen 1    TRUE METRIX BLOOD GLUCOSE TEST strip USE AS DIRECTED TO TEST 4 TIMES A  strip 5    VICTOZA 18 MG/3ML SOPN SC injection INJECT 1.8 MG UNDER THE SKIN ONCE DAILY      Blood Glucose Monitoring Suppl (TRUE METRIX METER) w/Device KIT Used to  check blood sugar 4 times eyad 1 kit 1    gabapentin (NEURONTIN) 600 MG tablet Take 0.5 tablets by mouth 2 times daily for 3 days. (Patient taking differently: Take 600 mg by mouth 3 times daily. ) 90 tablet 3    lisinopril (PRINIVIL;ZESTRIL) 40 MG tablet Take 0.5 tablets by mouth daily (Patient taking differently: Take 40 mg by mouth daily ) 90 tablet 1    atorvastatin (LIPITOR) 20 MG tablet Take 1 tablet by mouth daily 30 tablet 5    paliperidone (INVEGA) 9 MG extended release tablet Take 9 mg by mouth every morning       pantoprazole (PROTONIX) 40 MG tablet Take 40 mg by mouth daily       ferrous sulfate (IRON 325) 325 (65 Fe) MG tablet Take 325 mg by mouth daily (with breakfast)      oxyCODONE-acetaminophen (PERCOCET) 7.5-325 MG per tablet TAKE 1 TABLET BY MOUTH EVERY 6 (SIX) HOURS AS NEEDED FOR UP TO 28 DAYS.  clonazePAM (KLONOPIN) 0.5 MG tablet Take 0.5 mg by mouth 3 times daily as needed.       ticagrelor (BRILINTA) 90 MG TABS tablet Take 1 tablet by mouth 2 times daily 60 tablet 1    calcium carbonate-vitamin D (CALTRATE) 600-400 MG-UNIT TABS per tab Take 1 tablet by mouth daily       aspirin 81 MG tablet Take 81 mg by mouth daily      traZODone (DESYREL) 100 MG tablet Take 100 mg by mouth nightly       Allergies   Allergen Reactions    Morphine Anaphylaxis and Hives     feels like throat is closing    Penicillins Hives and Swelling    Codeine Hives and Rash    Penicillin G Rash       REVIEW OF SYSTEMS  10 systems reviewed, pertinent positives per HPI otherwise noted to be negative    PHYSICAL EXAM  BP (!) 168/65   Pulse 55   Temp 98.1 °F (36.7 °C) (Oral)   Resp 14   Wt 116 lb (52.6 kg)   SpO2 100%   BMI 20.55 kg/m²   GENERAL APPEARANCE: Awake and alert. Cooperative. No acute distress. Vital signs are stable. Well appearing and non toxic. HEAD: Normocephalic. Atraumatic. EYES: PERRL. EOM's grossly intact. ENT: Mucous membranes are moist.   NECK: Supple. Normal ROM. HEART: RRR. Distal pulses are equal and intact. Cap refill less than 2 seconds. LUNGS: Respirations unlabored. CTAB. Good air exchange. Speaking comfortably in full sentences. No wheezing, rhonchi, rales, stridor. CHEST: non tender  ABDOMEN: Soft. Non-distended. Non-tender. No guarding or rebound. No rigidity. Bowel sounds present. Negative robert's. Negative McBurney's point. Negative CVA tenderness. EXTREMITIES: No peripheral edema. Moves all extremities equally. All extremities neurovascularly intact. SKIN: Warm and dry. No acute rashes. NEUROLOGICAL: Alert and oriented. No gross facial drooping. Strength 5/5, sensation intact. PSYCHIATRIC: Normal mood and affect. SCREENINGS       RADIOLOGY  XR CHEST (2 VW)    Result Date: 11/2/2021  EXAMINATION: TWO XRAY VIEWS OF THE CHEST 11/2/2021 12:54 pm COMPARISON: Chest radiograph 09/27/2021 HISTORY: ORDERING SYSTEM PROVIDED HISTORY: Chest Discomfort TECHNOLOGIST PROVIDED HISTORY: Reason for exam:->Chest Discomfort FINDINGS: Lungs are hyperexpanded but otherwise clear. No pleural fluid or discrete pneumothorax. Cardiomediastinal silhouette is stable with mild tortuosity of the descending thoracic aorta. Surgical clips seen in the right upper quadrant. Mild scoliosis of the thoracolumbar spine is unchanged.      Findings of COPD without evidence of an acute superimposed cardiopulmonary process. ED COURSE/MDM  Patient seen and evaluated. Old records reviewed. Diagnostic testing reviewed and results discussed. I have seen this patient in collaboration with supervising physician Dr. Natalie Groves. We thoroughly discussed the history, physical exam, diagnostic testing and emergency department course. Portillo Santacruz presented to the ED today with above noted complaints. EKG interpreted by my attending physician penis elevation or sign of ischemia. Normal sinus rhythm. Troponin less than 0.01. Chest x-ray shows findings of COPD without evidence of an acute superimposed cardiopulmonary process. CMP notable for a glucose of 663. No anion gap or acute kidney injury. VBG shows a pH of 7.2. Patient given sodium chloride bolus and 10 units of regular insulin subcutaneously and admitted to the hospital for further management of hyperglycemia, acidosis, chest pain. While in ED patient received   Medications   0.9 % sodium chloride bolus (0 mLs IntraVENous Stopped 11/2/21 1554)   insulin regular (HUMULIN R;NOVOLIN R) injection 10 Units (10 Units SubCUTAneous Given 11/2/21 1550)   0.9 % sodium chloride bolus (1,000 mLs IntraVENous New Bag 11/2/21 1553)         A discussion was had with the patient and/or their surrogate regarding diagnosis, diagnostic testing results, treatment/ plan of care. There was shared decision-making between myself as well as the patient and/or their surrogate and we are all in agreement with hospital admission. There was an opportunity for questions and all questions were answered to the best of my ability and to the satisfaction of the patient and/or patient family.      Results for orders placed or performed during the hospital encounter of 11/02/21   CBC auto differential   Result Value Ref Range    WBC 5.1 4.0 - 11.0 K/uL    RBC 3.61 (L) 4.00 - 5.20 M/uL    Hemoglobin 10.6 (L) 12.0 - 16.0 g/dL    Hematocrit 33.4 (L) 36.0 - 48.0 % MCV 92.5 80.0 - 100.0 fL    MCH 29.3 26.0 - 34.0 pg    MCHC 31.7 31.0 - 36.0 g/dL    RDW 12.2 (L) 12.4 - 15.4 %    Platelets 870 313 - 542 K/uL    MPV 8.7 5.0 - 10.5 fL    Neutrophils % 75.5 %    Lymphocytes % 17.3 %    Monocytes % 6.1 %    Eosinophils % 0.8 %    Basophils % 0.3 %    Neutrophils Absolute 3.9 1.7 - 7.7 K/uL    Lymphocytes Absolute 0.9 (L) 1.0 - 5.1 K/uL    Monocytes Absolute 0.3 0.0 - 1.3 K/uL    Eosinophils Absolute 0.0 0.0 - 0.6 K/uL    Basophils Absolute 0.0 0.0 - 0.2 K/uL   Comprehensive metabolic panel   Result Value Ref Range    Sodium 134 (L) 136 - 145 mmol/L    Potassium 4.8 3.5 - 5.1 mmol/L    Chloride 97 (L) 99 - 110 mmol/L    CO2 25 21 - 32 mmol/L    Anion Gap 12 3 - 16    Glucose 663 (HH) 70 - 99 mg/dL    BUN 19 7 - 20 mg/dL    CREATININE 1.1 0.6 - 1.2 mg/dL    GFR Non-African American 50 (A) >60    GFR African American >60 >60    Calcium 9.2 8.3 - 10.6 mg/dL    Total Protein 7.2 6.4 - 8.2 g/dL    Albumin 3.6 3.4 - 5.0 g/dL    Albumin/Globulin Ratio 1.0 (L) 1.1 - 2.2    Total Bilirubin 0.3 0.0 - 1.0 mg/dL    Alkaline Phosphatase 154 (H) 40 - 129 U/L    ALT 67 (H) 10 - 40 U/L    AST 43 (H) 15 - 37 U/L   Troponin   Result Value Ref Range    Troponin <0.01 <0.01 ng/mL   Lactic acid, plasma   Result Value Ref Range    Lactic Acid 2.2 (H) 0.4 - 2.0 mmol/L   Blood gas, venous   Result Value Ref Range    pH, Kyle 7.286 (L) 7.350 - 7.450    pCO2, Kyle 49.9 40.0 - 50.0 mmHg    pO2, Kyle 46.8 (H) 25 - 40 mmHg    HCO3, Venous 23.2 23.0 - 29.0 mmol/L    Base Excess, Kyle -3.6 (L) -3.0 - 3.0 mmol/L    O2 Sat, Kyle 80 Not Established %    Carboxyhemoglobin 2.0 (H) 0.0 - 1.5 %    MetHgb, Kyle 0.4 <1.5 %    TC02 (Calc), Kyle 25 Not Established mmol/L    O2 Therapy Unknown    Urinalysis   Result Value Ref Range    Color, UA Yellow Straw/Yellow    Clarity, UA Clear Clear    Glucose, Ur >=1000 (A) Negative mg/dL    Bilirubin Urine Negative Negative    Ketones, Urine Negative Negative mg/dL    Specific [place], UA 1.015 1.005 - 1.030    Blood, Urine Negative Negative    pH, UA 5.5 5.0 - 8.0    Protein, UA Negative Negative mg/dL    Urobilinogen, Urine 0.2 <2.0 E.U./dL    Nitrite, Urine Negative Negative    Leukocyte Esterase, Urine Negative Negative    Microscopic Examination Not Indicated     Urine Type NotGiven    EKG 12 Lead   Result Value Ref Range    Ventricular Rate 78 BPM    Atrial Rate 78 BPM    P-R Interval 138 ms    QRS Duration 74 ms    Q-T Interval 376 ms    QTc Calculation (Bazett) 428 ms    P Axis 78 degrees    R Axis -40 degrees    T Axis 72 degrees    Diagnosis       Normal sinus rhythmLeft axis deviationAbnormal ECGWhen compared with ECG of 27-SEP-2021 19:47,No significant change was foundConfirmed by Maryagnes Apley ([id number]) on 11/2/2021 3:11:19 PM       I spoke with Dr. Ashia Schwartz. We thoroughly discussed the history, physical exam, laboratory and imaging studies, as well as, emergency department course. Based upon that discussion, we've decided to admit Marina Concepcion for further observation and evaluation of Agustina Fried's chest pain. As I have deemed necessary from their history, physical, and studies, I have considered and evaluated Marina Concepcion for the following diagnoses:  ACUTE CORONARY SYNDROME, PERICARDIAL TAMPONADE, PNEUMOTHORAX, PULMONARY EMBOLISM, and THORACIC DISSECTION. FINAL IMPRESSION  1. Chest pain, unspecified type    2. Hyperglycemia        Vitals:  Blood pressure (!) 168/65, pulse 55, temperature 98.1 °F (36.7 °C), temperature source Oral, resp. rate 14, weight 116 lb (52.6 kg), SpO2 100 %, not currently breastfeeding. DISPOSITION  Patient was admitted to the hospital in stable condition. Comment: Please note this report has been produced using speech recognition software and may contain errors related to that system including errors in grammar, punctuation, and spelling, as well as words and phrases that may be inappropriate.  If there are any questions or concerns please feel free to contact the dictating provider for clarification.             Zaira Patrick, SARTHAK - CNP  11/02/21 [id number]

## 2021-11-02 NOTE — ED NOTES
Patient assigned to room 215 at [phone]- Charge nurse from [facility] called to let emergency room know that they refuse to  come and get patient until a blood sugar has been rechecked- BS is 360, charge nurse Lizeth notified of recheck,      Alison De La Vega RN  11/02/21 [id number]

## 2021-11-03 VITALS
RESPIRATION RATE: 16 BRPM | WEIGHT: 118.17 LBS | BODY MASS INDEX: 20.93 KG/M2 | OXYGEN SATURATION: 99 % | HEART RATE: 58 BPM | TEMPERATURE: 97.2 F | SYSTOLIC BLOOD PRESSURE: 149 MMHG | DIASTOLIC BLOOD PRESSURE: 54 MMHG

## 2021-11-03 LAB
ANION GAP SERPL CALCULATED.3IONS-SCNC: 9 MMOL/L (ref 3–16)
BUN BLDV-MCNC: 15 MG/DL (ref 7–20)
CALCIUM SERPL-MCNC: 8.6 MG/DL (ref 8.3–10.6)
CHLORIDE BLD-SCNC: 105 MMOL/L (ref 99–110)
CHOLESTEROL, TOTAL: 99 MG/DL (ref 0–199)
CO2: 25 MMOL/L (ref 21–32)
CREAT SERPL-MCNC: 0.9 MG/DL (ref 0.6–1.2)
ESTIMATED AVERAGE GLUCOSE: 277.6 MG/DL
GFR AFRICAN AMERICAN: >60
GFR NON-AFRICAN AMERICAN: >60
GLUCOSE BLD-MCNC: 100 MG/DL (ref 70–99)
GLUCOSE BLD-MCNC: 114 MG/DL (ref 70–99)
GLUCOSE BLD-MCNC: 167 MG/DL (ref 70–99)
GLUCOSE BLD-MCNC: 175 MG/DL (ref 70–99)
GLUCOSE BLD-MCNC: 48 MG/DL (ref 70–99)
GLUCOSE BLD-MCNC: 52 MG/DL (ref 70–99)
HBA1C MFR BLD: 11.3 %
HCT VFR BLD CALC: 33 % (ref 36–48)
HDLC SERPL-MCNC: 55 MG/DL (ref 40–60)
HEMOGLOBIN: 10.5 G/DL (ref 12–16)
LACTIC ACID: 2.7 MMOL/L (ref 0.4–2)
LDL CHOLESTEROL CALCULATED: 32 MG/DL
MCH RBC QN AUTO: 29.3 PG (ref 26–34)
MCHC RBC AUTO-ENTMCNC: 31.8 G/DL (ref 31–36)
MCV RBC AUTO: 92.3 FL (ref 80–100)
PDW BLD-RTO: 12.4 % (ref 12.4–15.4)
PERFORMED ON: ABNORMAL
PLATELET # BLD: 131 K/UL (ref 135–450)
PMV BLD AUTO: 8.6 FL (ref 5–10.5)
POTASSIUM REFLEX MAGNESIUM: 4.6 MMOL/L (ref 3.5–5.1)
RBC # BLD: 3.58 M/UL (ref 4–5.2)
SODIUM BLD-SCNC: 139 MMOL/L (ref 136–145)
TRIGL SERPL-MCNC: 62 MG/DL (ref 0–150)
URINE CULTURE, ROUTINE: NORMAL
VLDLC SERPL CALC-MCNC: 12 MG/DL
WBC # BLD: 5.2 K/UL (ref 4–11)

## 2021-11-03 PROCEDURE — G0378 HOSPITAL OBSERVATION PER HR: HCPCS

## 2021-11-03 PROCEDURE — 96374 THER/PROPH/DIAG INJ IV PUSH: CPT

## 2021-11-03 PROCEDURE — 85027 COMPLETE CBC AUTOMATED: CPT

## 2021-11-03 PROCEDURE — 96361 HYDRATE IV INFUSION ADD-ON: CPT

## 2021-11-03 PROCEDURE — 96372 THER/PROPH/DIAG INJ SC/IM: CPT

## 2021-11-03 PROCEDURE — 80048 BASIC METABOLIC PNL TOTAL CA: CPT

## 2021-11-03 PROCEDURE — 36415 COLL VENOUS BLD VENIPUNCTURE: CPT

## 2021-11-03 PROCEDURE — 6370000000 HC RX 637 (ALT 250 FOR IP): Performed by: INTERNAL MEDICINE

## 2021-11-03 PROCEDURE — 99254 IP/OBS CNSLTJ NEW/EST MOD 60: CPT | Performed by: INTERNAL MEDICINE

## 2021-11-03 PROCEDURE — 83605 ASSAY OF LACTIC ACID: CPT

## 2021-11-03 PROCEDURE — 2500000003 HC RX 250 WO HCPCS: Performed by: INTERNAL MEDICINE

## 2021-11-03 PROCEDURE — 6360000002 HC RX W HCPCS: Performed by: INTERNAL MEDICINE

## 2021-11-03 PROCEDURE — 80061 LIPID PANEL: CPT

## 2021-11-03 RX ADMIN — DEXTROSE MONOHYDRATE 12.5 G: 25 INJECTION, SOLUTION INTRAVENOUS at 04:57

## 2021-11-03 RX ADMIN — GABAPENTIN 300 MG: 300 CAPSULE ORAL at 10:00

## 2021-11-03 RX ADMIN — ATORVASTATIN CALCIUM 20 MG: 10 TABLET, FILM COATED ORAL at 10:00

## 2021-11-03 RX ADMIN — ENOXAPARIN SODIUM 40 MG: 40 INJECTION SUBCUTANEOUS at 10:00

## 2021-11-03 RX ADMIN — GLUCAGON HYDROCHLORIDE 1 MG: KIT at 08:08

## 2021-11-03 RX ADMIN — ASPIRIN 81 MG: 81 TABLET, COATED ORAL at 10:00

## 2021-11-03 RX ADMIN — PANTOPRAZOLE SODIUM 40 MG: 40 TABLET, DELAYED RELEASE ORAL at 10:00

## 2021-11-03 RX ADMIN — PALIPERIDONE 9 MG: 3 TABLET, EXTENDED RELEASE ORAL at 10:00

## 2021-11-03 RX ADMIN — LISINOPRIL 20 MG: 20 TABLET ORAL at 10:00

## 2021-11-03 RX ADMIN — OXYCODONE AND ACETAMINOPHEN 1 TABLET: 7.5; 325 TABLET ORAL at 10:04

## 2021-11-03 RX ADMIN — TICAGRELOR 90 MG: 90 TABLET ORAL at 10:00

## 2021-11-03 ASSESSMENT — PAIN SCALES - GENERAL
PAINLEVEL_OUTOF10: 0
PAINLEVEL_OUTOF10: 7

## 2021-11-03 NOTE — PROGRESS NOTES
MD pagechristina, \"FYI: pt temp this AM rectal was 94.7. She is slightly hypotensive as well (102/59). Her lactic on admit was 2.8. I will put pt on bear hugger. Are there any other intervention you would like? In addition, she was complaining of feeling shivery and dizzy and off. I checked her sugar and it was 48, I gave her IV dextrose and it went up to 114. \"

## 2021-11-03 NOTE — CONSULTS
[address] [facility]  [phone]      Attending Physician: Kamryn Aguayo MD  Reason for Consultation/Chief Complaint: Chest pain    Subjective   History of Present Illness:  Pillo Saunders is a 57 y.o. patient who presented to the hospital with complaints of chest pain, she notes this over the last few days, patient presented to hospital yesterday on 11/2/2021, was admitted due to chest pain as well as hyperglycemia although her sugars did fluctuate ultimately became lower. She also notes feeling dizzy and shaky. She was noted to be hypothermic and hypotensive. Serial troponin levels were assessed and found to be negative. Patient states she continues to have chest pain. Patient is status post recent hospital admission from 9/27/2021 until 9/29/2021 and at that time, she presented with similar complaints including poorly controlled diabetes and hypertension. At that time, she underwent cardiac catheterization as she had had a stress test in May 2021 which was noted to be abnormal.  Catheterization showed stable CAD, she was treated medically/expectantly. She gets her usual cardiac care through [facility], her cardiologist there is Dr. Xu Delgado cardiac history dates back to 2010, she was evaluated in the hospital for syncope, she had an echocardiogram which showed mild diffuse hypokinesis with ejection fraction of 45 to 50%.  At that time, plans were made for outpatient follow-up with heart monitor. Sweta Baker has continued to follow-up with doctors at [facility], her last visit at [place] [address] being on September 13, 2019, at that time she was felt to be stable with regard to chronic conditions which included hypertension, cardiomyopathy, history of syncope and TIA.   She did ultimately transition to our practice in 2020, last saw our nurse practitioner after her cardiac catheterization was performed with diagonal branch PCI in 2020 with Dr. Darvin Cheema chronically also states she has diabetes, follows with endocrinology, she says her blood sugar is not well controlled. Past Medical History:   has a past medical history of Abnormal brain MRI, Acute bilateral low back pain without sciatica, SHARRON (acute kidney injury) ([address] Arthritis, Bipolar disorder ([address] CAD (coronary artery disease), Cancer ([address] Carotid stenosis, bilateral:<50%:per [place] 7/2016, Carpal tunnel syndrome, Cervical cancer screening, Coronary artery disease of native artery of native heart with stable angina pectoris ([address] DDD (degenerative disc disease), lumbar, Depression, Depression/anxiety, Depression/anxiety, Diabetes mellitus ([address] Gout, History of mammogram, Hyperlipidemia, Hypertension, 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 Surgical History:   has a past surgical history that includes Kidney removal; Hysterectomy; Breast lumpectomy (2015); Tubal ligation; other surgical history (Right); Cardiac catheterization (06/08/2020); Upper gastrointestinal endoscopy (N/A, 1/29/2021); Colonoscopy (N/A, 2/1/2021); CT BIOPSY BONE MARROW (2/3/2021); and Temporal Artery Biopsy (Right, 8/9/2021). Social History:   reports that she quit smoking about 7 years ago. Her smoking use included cigarettes and cigars. She has a 10.00 pack-year smoking history. She has never used smokeless tobacco. She reports that she does not drink alcohol and does not use drugs. Family History:  family history includes Cancer in her father and mother.       Home Medications:  Were reviewed and are listed in nursing record and/or below  Prior to Admission medications    Medication Sig Start Date End Date Taking? Authorizing Provider   nitroGLYCERIN (NITROSTAT) 0.4 MG SL tablet up to max of 3 total doses. If no relief after 1 dose, call 911. 9/29/21  Yes Salty Slaazar MD   insulin lispro, 1 Unit Dial, (HUMALOG KWIKPEN) 100 UNIT/ML SOPN Inject 9 Units into the skin 3 times daily    Yes Historical Provider, MD   glimepiride (AMARYL) 2 MG tablet 2 mg with breakfast or first meal of the day 8/17/21  Yes Agnieszka Hendrix MD   fluticasone (FLONASE) 50 MCG/ACT nasal spray 1 spray by Each Nostril route daily 8/12/21  Yes Nikos Gauthier MD   insulin glargine (LANTUS SOLOSTAR) 100 UNIT/ML injection pen Inject 35 Units into the skin nightly  Patient taking differently: Inject 26 Units into the skin every morning  8/11/21  Yes Nikos Gauthier MD   TRUE METRIX BLOOD GLUCOSE TEST strip USE AS DIRECTED TO TEST 4 TIMES A DAY 7/19/21  Yes gAnieszka Hendrix MD   Blood Glucose Monitoring Suppl (TRUE METRIX METER) w/Device KIT Used to  check blood sugar 4 times eyad 6/9/21  Yes Agnieszka Hendrix MD   lisinopril (PRINIVIL;ZESTRIL) 40 MG tablet Take 0.5 tablets by mouth daily  Patient taking differently: Take 40 mg by mouth daily  6/4/21  Yes Bernadette Appiah MD   atorvastatin (LIPITOR) 20 MG tablet Take 1 tablet by mouth daily 5/20/21  Yes Cheryle Hernández MD   paliperidone (INVEGA) 9 MG extended release tablet Take 9 mg by mouth every morning  3/15/21  Yes Historical Provider, MD   pantoprazole (PROTONIX) 40 MG tablet Take 40 mg by mouth daily  4/3/21  Yes Historical Provider, MD   ferrous sulfate (IRON 325) 325 (65 Fe) MG tablet Take 325 mg by mouth daily (with breakfast)   Yes Historical Provider, MD   oxyCODONE-acetaminophen (PERCOCET) 7.5-325 MG per tablet TAKE 1 TABLET BY MOUTH EVERY 6 (SIX) HOURS AS NEEDED FOR UP TO 28 DAYS. 3/11/21  Yes Historical Provider, MD   clonazePAM (KLONOPIN) 0.5 MG tablet Take 0.5 mg by mouth 3 times daily as needed.  3/15/21  Yes Historical Provider, MD   ticagrelor (BRILINTA) 90 MG TABS tablet Take 1 tablet by mouth 2 times daily 2/3/21  Yes Lynn Vickers MD   calcium carbonate-vitamin D (CALTRATE) 600-400 MG-UNIT TABS per tab Take 1 tablet by mouth daily  12/30/19  Yes Historical Provider, MD   aspirin 81 MG tablet Take 81 mg by mouth daily   Yes Historical Provider, MD   traZODone (DESYREL) 100 MG tablet Take 100 mg by mouth nightly   Yes Historical Provider, MD   VICTOZA 18 MG/3ML SOPN SC injection INJECT 1.8 MG UNDER THE SKIN ONCE DAILY  Patient not taking: Reported on 11/2/2021 6/8/21   Historical Provider, MD   gabapentin (NEURONTIN) 600 MG tablet Take 0.5 tablets by mouth 2 times daily for 3 days. Patient taking differently: Take 600 mg by mouth 3 times daily.   6/4/21 8/8/21  Samia Collins MD        CURRENT Medications:  aspirin EC tablet 81 mg, Daily  atorvastatin (LIPITOR) tablet 20 mg, Daily  gabapentin (NEURONTIN) capsule 300 mg, BID  [Held by provider] glipiZIDE (GLUCOTROL) tablet 5 mg, QAM AC  insulin glargine (LANTUS) injection vial 35 Units, Nightly  lisinopril (PRINIVIL;ZESTRIL) tablet 20 mg, Daily  paliperidone (INVEGA) extended release tablet 9 mg, QAM  pantoprazole (PROTONIX) tablet 40 mg, Daily  ticagrelor (BRILINTA) tablet 90 mg, BID  traZODone (DESYREL) tablet 100 mg, Nightly  [Held by provider] Liraglutide (VICTOZA) SC injection 1.8 mg, Daily  sodium chloride flush 0.9 % injection 5-40 mL, 2 times per day  sodium chloride flush 0.9 % injection 5-40 mL, PRN  0.9 % sodium chloride infusion, PRN  ondansetron (ZOFRAN-ODT) disintegrating tablet 4 mg, Q8H PRN   Or  ondansetron (ZOFRAN) injection 4 mg, Q6H PRN  acetaminophen (TYLENOL) tablet 650 mg, Q6H PRN   Or  acetaminophen (TYLENOL) suppository 650 mg, Q6H PRN  polyethylene glycol (GLYCOLAX) packet 17 g, Daily PRN  nitroGLYCERIN (NITROSTAT) SL tablet 0.4 mg, Q5 Min PRN  0.9 % sodium chloride infusion, Continuous  enoxaparin (LOVENOX) injection 40 mg, Daily  insulin lispro (HUMALOG) injection vial 0-12 Units, TID WC  insulin lispro (HUMALOG) injection vial 0-6 Units, Nightly  ketorolac (TORADOL) injection 30 mg, Once  oxyCODONE-acetaminophen (PERCOCET) 7.5-325 MG per tablet 1 tablet, Q6H PRN  glucose (GLUTOSE) 40 % oral gel 15 g, PRN  dextrose 50 % IV solution, PRN  glucagon (rDNA) injection 1 mg, PRN  dextrose 5 % solution, PRN        Allergies:  Morphine, Penicillins, Codeine, and Penicillin g     Review of Systems:   A 14 point review of symptoms completed. Pertinent positives identified in the HPI, all other review of symptoms negative as below.       Objective   PHYSICAL EXAM:    Vitals:    11/03/21 0804   BP: (!) 149/54   Pulse: 58   Resp: 16   Temp: 97.2 °F (36.2 °C)   SpO2: 99%    Weight: 118 lb 2.7 oz (53.6 kg)         General Appearance:  Alert, cooperative, no distress, appears stated age, lying flat, able to speak in full sentences   Head:  Normocephalic, without obvious abnormality, atraumatic   Eyes:  PERRL, conjunctiva/corneas clear   Nose: Nares normal, no drainage or sinus tenderness   Throat: Lips, mucosa, and tongue normal   Neck: Supple, symmetrical, trachea midline, no adenopathy, thyroid: not enlarged, symmetric, no tenderness/mass/nodules, no carotid bruit or JVD   Lungs:   Clear to auscultation bilaterally, respirations unlabored   Chest Wall:   Positive reproducible tenderness throughout the middle and left side   Heart:  Regular rate and rhythm, S1, S2 normal, 2 out of 6 systolic murmur, positive S4   Abdomen:   Soft, non-tender, bowel sounds active all four quadrants,  no masses, no organomegaly   Extremities: Extremities normal, atraumatic, no cyanosis or edema   Pulses: 2+ and symmetric   Skin: Skin color, texture, turgor normal, no rashes or lesions   Pysch:  Anxious mood and affect   Neurologic: Normal gross motor and sensory exam.         Labs   CBC:   Lab Results   Component Value Date    WBC 5.1 11/02/2021    RBC 3.61 11/02/2021    HGB 10.6 11/02/2021 HCT 33.4 11/02/2021    MCV 92.5 11/02/2021    RDW 12.2 11/02/2021     11/02/2021     CMP:  Lab Results   Component Value Date     11/02/2021    K 4.8 11/02/2021    K 3.6 09/29/2021    CL 97 11/02/2021    CO2 25 11/02/2021    BUN 19 11/02/2021    CREATININE 1.1 11/02/2021    GFRAA >60 11/02/2021    GFRAA >60 05/29/2013    AGRATIO 1.0 11/02/2021    LABGLOM 50 11/02/2021    GLUCOSE 663 11/02/2021    PROT 7.2 11/02/2021    PROT 8.1 01/05/2013    CALCIUM 9.2 11/02/2021    BILITOT 0.3 11/02/2021    ALKPHOS 154 11/02/2021    AST 43 11/02/2021    ALT 67 11/02/2021     PT/INR:  No results found for: PTINR  HgBA1c:  Lab Results   Component Value Date    LABA1C 14.4 09/29/2021     Lab Results   Component Value Date    CKTOTAL 120 06/03/2021    TROPONINI <0.01 11/02/2021         Cardiac Data     Last EKG: Normal sinus rhythm, borderline left axis, baseline artifact/wander, overall similar to prior EKG from September 2021    Echo:     5/2021     Summary   The left ventricular systolic function is low normal with a visually   estimated ejection fraction of 50-55%.   No obvious regional wall motion abnormalities are seen.   Normal left ventricular size with mild concentric left ventricular   hypertrophy.   Grade I diastolic dysfunction with normal filling pressure.   The right ventricle is normal in size and function.   Mild mitral and pulmonic regurgitation. 9/2021:  Summary   Limited only f/u for LVEF. The left ventricular systolic function is mildly reduced with an ejection   fraction of 50- 55 %. Compared to previous study from 5- no changes noted in left   ventricular function.     Stress Test:     5/2021          Summary    Moderate severity, fixed, mid to apical anterior perfusion defect with    reduced myocardial wall motion/thickening suggestive of prior myocardial    infarction. No inducible ischemia. Normal LV size by volumes.  Post-stress LV    function is reduced at 36% with serafin-apical hypokinesis/possible apical    dyskinesis. Abnormal high risk study. Suggest echocardiogram to corroborate    findings.           Cath:    2021:    CARDIAC CATHETERIZATION REPORT      Procedure Date:  2021  Patient Name: Xuan Raymundo  MRN: [id number]      : 1960        INDICATION      Progressive angina, class IV     PROCEDURES PERFORMED      Left heart catheterization  LVgram  Coronary angiogam  Coronary cath  Monitoring of moderate conscious sedation              PROCEDURE DESCRIPTION   Risks/benefits/alternatives/outcomes were discussed with patient and/or family and informed consent was obtained. Using the [facility] scale, the patient's right radial artery was found to be a level B. Patient was prepped draped in the usual sterile fashion. Local anaesthetic was applied over puncture site. Using a front wall technique, a 4/5 [place] Terumo sheath was inserted into right radial artery. Verapamil, nitroglycerin, nicardipine were administered through the sheath. Heparin was administered. Diagnostic 5 [place] pigtail, ultra Catheters were used for diagnostic angiograms. At the conclusion of the procedure, a TR band was placed over the puncture site and hemostasis was obtained. There were no immediate complications. I supervised sedation from 10:46 AM to 11:03 AM with versed 1 mg/fentanyl 50 mcg during the procedure. An independent trained observer pushed meds at my direction. We monitored the patient's level of consciousness and vital signs/physiologic status throughout the procedure duration (see times listed previously). 80 cc contrast was utilized. <20cc EBL.    Pt declined for me to call family to give update.        FINDINGS         LVGRAM     LVEDP  6   GRADIENT ACROSS AORTIC VALVE  none   LV FUNCTION EF 60%   WALL MOTION normal   MITRAL REGURGITATION mild            CORONARY ARTERIES     LM Less than 10% [id number] stenosis            LAD  Proximal 20% stenosis, mid 40 to 50% stenosis. Distal less than 10% stenosis.     D1 has a high takeoff, and has a proximal-mid stent which is patent with 30 to 40% in-stent restenosis.       LCX Proximal-mid 10 to 20% stenosis. Mid-distal 40 to 50% stenosis that extends into a large OM1.         RI The high takeoff of D1 could also be described as a ramus artery. See above for details.         RCA Dominant, medium sized vessel, [id number] 30% stenosis.                   CONCLUSIONS:      Patent diagonal artery stent  Mild to moderate residual CAD/ASHD  Treat medically  Continue aspirin, statin, lisinopril. No beta-blocker due to bradycardia. No further inpatient cardiac work-up or treatment is planned, will sign off, please call with questions.               Studies:     cxr       Impression   Findings of COPD without evidence of an acute superimposed cardiopulmonary   process.               I have reviewed labs and imaging/xray/diagnostic testing in this note. Assessment and Plan          Patient Active Problem List   Diagnosis    Acute hyperglycemia    Essential hypertension    Bilateral malignant neoplasm of breast in female ([address]    Microalbuminuria    Obesity (BMI 30-39. 9)    Slurred speech    Hx of ischemic CVA    Brain metastases ([address]    Carotid stenosis, bilateral:<50%:per US 7/2016    SHARRON (acute kidney injury) ([address]    Lactic acidosis    Frequent falls    Depression/anxiety    Abnormal brain MRI    Acute bilateral low back pain without sciatica    Uncontrolled type 2 diabetes mellitus with microalbuminuria, with long-term current use of insulin (HCC)    Closed fracture of right ankle, with routine healing, subsequent encounter    Stage 3b chronic kidney disease ([address]    Uncontrolled type 2 diabetes mellitus with diabetic nephropathy, with long-term current use of insulin (HCC)    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 ([address]    Dyslipidemia associated with type 2 diabetes mellitus ([address]  Bipolar disorder ([address]    Cardiomegaly    Cardiomyopathy ([address]    Carpal tunnel syndrome    Chronic systolic heart failure (HCC)    Diffuse cystic mastopathy    Edema    Gallstone pancreatitis    Gout    History of bilateral breast cancer    History of renal cell carcinoma    Cardiomyopathy in other diseases classified elsewhere    Mononeuritis    Myalgia and myositis    Nonspecific abnormal electrocardiogram (ECG) (EKG)    Patient in clinical research study    Peroneal muscular atrophy    Scoliosis (and kyphoscoliosis), idiopathic    SOBOE (shortness of breath on exertion)    Syncope and collapse    Chronic pain disorder    DDD (degenerative disc disease), lumbar    Diabetic polyneuropathy associated with type 2 diabetes mellitus (HCC)    Other secondary scoliosis, lumbosacral region    Thoracic spondylosis without myelopathy    Morbid obesity due to excess calories (HCC)    Age-related nuclear cataract of both eyes    Hypermetropia, bilateral    Hypertensive retinopathy, bilateral    Vitreous degeneration, bilateral    Acute bilateral low back pain with left-sided sciatica    History of CVA (cerebrovascular accident) without residual deficits    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 stage 3 chronic kidney disease (HCC)    S/P mastectomy, right    Acute cystitis without hematuria    Hypomagnesemia    Chest pain    CAD S/P percutaneous coronary angioplasty    DKA (diabetic ketoacidoses)    Type 2 diabetes mellitus with hyperglycemia, with long-term current use of insulin (HCC)    Hx of heart artery stent    Nuclear senile cataract    Unintentional weight loss    Anemia    Facial abscess    Asymptomatic bacteriuria    Acute encephalopathy    Tobacco use disorder    Severe malnutrition (HCC)    Acute right eye pain    Suspected condition       Chest pain, likely noncardiac, manage medically/expectantly.   Obtain follow-up limited echocardiogram for LV function, this can be done either as outpt or inpt.      CAD/ASHD, continue dual antiplatelet therapy     Hypertension, continue lisinopril, no beta-blocker due to bradycardia    Hypercholesterolemia, continue statin     Diabetes, as per primary service    No further inpt cv w/u or tx is planned, will sign off, please call with ?s    Thank you for allowing us to participate in the care of Abbey Owens. Please call me with any questions [phone].     Xuan Webb MD, [facility]   Interventional Cardiologist  [facility]  [phone] [facility]  [phone] [facility]  11/3/2021 8:38 AM

## 2021-11-03 NOTE — PLAN OF CARE
Problem: Metabolic:  Goal: Ability to maintain appropriate glucose levels will improve  Description: Ability to maintain appropriate glucose levels will improve  Outcome: Ongoing  Note: Pt will have accuchecks before meals and at bedtime with sliding scale insulin in place for coverage. Will continue to monitor for signs and symptoms of hypoglycemia and hyperglycemia throughout shift. Problem: Falls - Risk of:  Goal: Will remain free from falls  Description: Will remain free from falls  Outcome: Ongoing  Note: Pt will remain free from falls throughout hospital stay. Fall precautions in place, bed alarm on, bed in lowest position with wheels locked and side rails 2/4 up. Room door open and hourly rounding completed. Will continue to monitor throughout shift.

## 2021-11-03 NOTE — PROGRESS NOTES
Pt's AM glucose reading 52. PRN glucagon administered and orange juice provided to pt.    Glucose recheck 100

## 2021-11-03 NOTE — DISCHARGE SUMMARY
Hospital Medicine Discharge Summary    Patient ID: Abbey Owens      Patient's PCP: Brandy Conti Date: 11/2/2021     Discharge Date:   11/03/21     Admitting Physician: Teodoro Kim MD     Discharge Physician: Carrie Villaseñor MD     Discharge Diagnoses: Active Hospital Problems    Diagnosis     Chest pain [R07.9]        The patient was seen and examined on day of discharge and this discharge summary is in conjunction with any daily progress note from day of discharge. Hospital Course:  57 Y F with a h/o DM2 and CAD s/p stenting presented with atypical chest pain and was placed in observation overnight. Cardiology saw her the next morning and determined that she didn't need any further inpatient cardiac testing but a TTE could be considered as outpatient. F/u soon with cardiology. I do not suspect PE or acute aortic pathology. Doesn't sound like GERD or pericarditis. Perhaps muscular pain. Better now. Discharging home. Encouraged better compliance with her DM2 regimen. A1c very high recently. Sugars fairly easy to control here after she arrived at 663. The mild lactic acidosis was due to dehydration from glucosuria. She is clinically fine now, feels back to normal, good appetite. No signs of infection. Continue percocet for her chronic back pain. Physical Exam Performed:     BP (!) 149/54   Pulse 58   Temp 97.2 °F (36.2 °C) (Oral)   Resp 16   Wt 118 lb 2.7 oz (53.6 kg)   SpO2 99%   BMI 20.93 kg/m²       General appearance:  No apparent distress, appears stated age and cooperative. HEENT:  Normal cephalic, atraumatic without obvious deformity. Pupils equal, round, and reactive to light. Extra ocular muscles intact. Conjunctivae/corneas clear. Neck: Supple, with full range of motion. No jugular venous distention. Trachea midline. Respiratory:  Normal respiratory effort.  Clear to auscultation, bilaterally without Rales/Wheezes/Rhonchi. Cardiovascular:  Regular rate and rhythm with normal S1/S2 without murmurs, rubs or gallops. Abdomen: Soft, non-tender, non-distended with normal bowel sounds. Musculoskeletal:  No clubbing, cyanosis or edema bilaterally. Full range of motion without deformity. Skin: Skin color, texture, turgor normal.  No rashes or lesions. Neurologic:  Neurovascularly intact without any focal sensory/motor deficits. Cranial nerves: II-XII intact, grossly non-focal.  Psychiatric:  Alert and oriented, thought content appropriate, normal insight  Capillary Refill: Brisk,< 3 seconds   Peripheral Pulses: +2 palpable, equal bilaterally       Labs: For convenience and continuity at follow-up the following most recent labs are provided:      CBC:    Lab Results   Component Value Date    WBC 5.2 11/03/2021    HGB 10.5 11/03/2021    HCT 33.0 11/03/2021     11/03/2021       Renal:    Lab Results   Component Value Date     11/03/2021    K 4.6 11/03/2021     11/03/2021    CO2 25 11/03/2021    BUN 15 11/03/2021    CREATININE 0.9 11/03/2021    CALCIUM 8.6 11/03/2021    PHOS 2.9 03/06/2021         Significant Diagnostic Studies    Radiology:   XR CHEST (2 VW)   Final Result   Findings of COPD without evidence of an acute superimposed cardiopulmonary   process. Consults:     IP CONSULT TO CARDIOLOGY    Disposition:  [facility]     Condition at Discharge: Stable    Discharge Instructions/Follow-up:  Follow up with PCP within 1-2 weeks. Code Status:  Full Code     Activity: activity as tolerated    Diet: diabetic diet      Discharge Medications:     Current Discharge Medication List           Details   nitroGLYCERIN (NITROSTAT) 0.4 MG SL tablet up to max of 3 total doses. If no relief after 1 dose, call 911.   Qty: 25 tablet, Refills: 0      insulin lispro, 1 Unit Dial, (HUMALOG KWIKPEN) 100 UNIT/ML SOPN Inject 9 Units into the skin 3 times daily       glimepiride (AMARYL) 2 MG tablet 2 mg with breakfast or first meal of the day  Qty: 30 tablet, Refills: 5    Associated Diagnoses: Uncontrolled type 2 diabetes mellitus with microalbuminuria, with long-term current use of insulin ([facility] [address] Type 2 diabetes mellitus with other diabetic kidney complication ([facility] [address] Type 2 diabetes mellitus with diabetic nephropathy, with long-term current use of insulin ([facility])      fluticasone (FLONASE) 50 MCG/ACT nasal spray 1 spray by Each Nostril route daily  Qty: 1 Bottle, Refills: 1      insulin glargine (LANTUS SOLOSTAR) 100 UNIT/ML injection pen Inject 35 Units into the skin nightly  Qty: 1 pen, Refills: 1    Associated Diagnoses: Uncontrolled type 2 diabetes mellitus with microalbuminuria, with long-term current use of insulin ([facility])      TRUE METRIX BLOOD GLUCOSE TEST strip USE AS DIRECTED TO TEST 4 TIMES A DAY  Qty: 200 strip, Refills: 5    Associated Diagnoses: Uncontrolled type 2 diabetes mellitus with microalbuminuria, with long-term current use of insulin ([facility])      Blood Glucose Monitoring Suppl (TRUE METRIX METER) w/Device KIT Used to  check blood sugar 4 times eyad  Qty: 1 kit, Refills: 1      lisinopril (PRINIVIL;ZESTRIL) 40 MG tablet Take 0.5 tablets by mouth daily  Qty: 90 tablet, Refills: 1      atorvastatin (LIPITOR) 20 MG tablet Take 1 tablet by mouth daily  Qty: 30 tablet, Refills: 5      paliperidone (INVEGA) 9 MG extended release tablet Take 9 mg by mouth every morning       pantoprazole (PROTONIX) 40 MG tablet Take 40 mg by mouth daily       ferrous sulfate (IRON 325) 325 (65 Fe) MG tablet Take 325 mg by mouth daily (with breakfast)      oxyCODONE-acetaminophen (PERCOCET) 7.5-325 MG per tablet TAKE 1 TABLET BY MOUTH EVERY 6 (SIX) HOURS AS NEEDED FOR UP TO 28 DAYS. clonazePAM (KLONOPIN) 0.5 MG tablet Take 0.5 mg by mouth 3 times daily as needed.       ticagrelor (BRILINTA) 90 MG TABS tablet Take 1 tablet by mouth 2 times daily  Qty: 60 tablet, Refills: 1      calcium carbonate-vitamin D (CALTRATE)

## 2021-11-03 NOTE — PROGRESS NOTES
PIV removed. Tip intact. Dressing in place. Tele box removed. [facility] notified of pt discharge. Discharge paperwork went over with and given to pt. Verbalizes understanding. Pt lock box emptied. Pt wheeled via wheelchair to [employer]'s cab with all belongings. Pt discharge home in stable condition.

## 2021-11-10 RX ORDER — TICAGRELOR 90 MG/1
TABLET ORAL
Qty: 60 TABLET | Refills: 11 | OUTPATIENT
Start: 2021-11-10

## 2021-11-10 NOTE — TELEPHONE ENCOUNTER
Mrs. Angy Rabago advised via VM  to contact her PCP or Cardiologist if she has one for this refill. This medication is not prescribed by Dr. Familia Burkett.

## 2021-11-17 RX ORDER — ATORVASTATIN CALCIUM 20 MG/1
TABLET, FILM COATED ORAL
Qty: 30 TABLET | Refills: 5 | Status: SHIPPED | OUTPATIENT
Start: 2021-11-17 | End: 2022-04-18

## 2021-11-17 RX ORDER — LISINOPRIL 40 MG/1
40 TABLET ORAL DAILY
Qty: 90 TABLET | Refills: 1 | Status: SHIPPED | OUTPATIENT
Start: 2021-11-17 | End: 2022-04-18

## 2021-11-17 NOTE — TELEPHONE ENCOUNTER
Requested Prescriptions     Pending Prescriptions Disp Refills    lisinopril (PRINIVIL;ZESTRIL) 40 MG tablet [Pharmacy Med Name: LISINOPRIL 40 MG TABLET] 90 tablet 1     Sig: TAKE 1 TABLET BY MOUTH EVERY DAY    atorvastatin (LIPITOR) 20 MG tablet [Pharmacy Med Name: ATORVASTATIN 20 MG TABLET] 30 tablet 5     Sig: TAKE 1 TABLET BY MOUTH EVERY DAY     Last refilled:Lisinopril -6/4/2021  Las refilled: Atorvastatin - 5/20/2021  Last seen: 10/6/2021  Follow up: 1/12/2022

## 2021-11-22 ENCOUNTER — APPOINTMENT (OUTPATIENT)
Dept: GENERAL RADIOLOGY | Age: 61
End: 2021-11-22
Payer: MEDICAID

## 2021-11-22 ENCOUNTER — HOSPITAL ENCOUNTER (EMERGENCY)
Age: 61
Discharge: HOME | End: 2021-11-23
Payer: MEDICAID

## 2021-11-22 DIAGNOSIS — E11.65 TYPE 2 DIABETES MELLITUS WITH HYPERGLYCEMIA, WITH LONG-TERM CURRENT USE OF INSULIN (HCC): ICD-10-CM

## 2021-11-22 DIAGNOSIS — R09.81 COMPLAINT OF NASAL CONGESTION: ICD-10-CM

## 2021-11-22 DIAGNOSIS — U07.1 COVID-19: Primary | ICD-10-CM

## 2021-11-22 DIAGNOSIS — Z79.4 TYPE 2 DIABETES MELLITUS WITH HYPERGLYCEMIA, WITH LONG-TERM CURRENT USE OF INSULIN (HCC): ICD-10-CM

## 2021-11-22 LAB
A/G RATIO: 0.9 (ref 1.1–2.2)
ALBUMIN SERPL-MCNC: 3.6 G/DL (ref 3.4–5)
ALP BLD-CCNC: 170 U/L (ref 40–129)
ALT SERPL-CCNC: 57 U/L (ref 10–40)
ANION GAP SERPL CALCULATED.3IONS-SCNC: 10 MMOL/L (ref 3–16)
AST SERPL-CCNC: 24 U/L (ref 15–37)
BASOPHILS ABSOLUTE: 0 K/UL (ref 0–0.2)
BASOPHILS RELATIVE PERCENT: 0.2 %
BILIRUB SERPL-MCNC: <0.2 MG/DL (ref 0–1)
BUN BLDV-MCNC: 14 MG/DL (ref 7–20)
CALCIUM SERPL-MCNC: 9.3 MG/DL (ref 8.3–10.6)
CHLORIDE BLD-SCNC: 98 MMOL/L (ref 99–110)
CO2: 26 MMOL/L (ref 21–32)
CREAT SERPL-MCNC: 1.1 MG/DL (ref 0.6–1.2)
EOSINOPHILS ABSOLUTE: 0 K/UL (ref 0–0.6)
EOSINOPHILS RELATIVE PERCENT: 0.4 %
GFR AFRICAN AMERICAN: >60
GFR NON-AFRICAN AMERICAN: 50
GLUCOSE BLD-MCNC: 223 MG/DL (ref 70–99)
GLUCOSE BLD-MCNC: 306 MG/DL (ref 70–99)
GLUCOSE BLD-MCNC: 384 MG/DL (ref 70–99)
HCT VFR BLD CALC: 33.2 % (ref 36–48)
HEMOGLOBIN: 10.7 G/DL (ref 12–16)
LYMPHOCYTES ABSOLUTE: 1 K/UL (ref 1–5.1)
LYMPHOCYTES RELATIVE PERCENT: 20 %
MCH RBC QN AUTO: 29.1 PG (ref 26–34)
MCHC RBC AUTO-ENTMCNC: 32.2 G/DL (ref 31–36)
MCV RBC AUTO: 90.4 FL (ref 80–100)
MONOCYTES ABSOLUTE: 0.5 K/UL (ref 0–1.3)
MONOCYTES RELATIVE PERCENT: 9.6 %
NEUTROPHILS ABSOLUTE: 3.3 K/UL (ref 1.7–7.7)
NEUTROPHILS RELATIVE PERCENT: 69.8 %
PDW BLD-RTO: 12 % (ref 12.4–15.4)
PERFORMED ON: ABNORMAL
PERFORMED ON: ABNORMAL
PLATELET # BLD: 149 K/UL (ref 135–450)
PMV BLD AUTO: 8.8 FL (ref 5–10.5)
POTASSIUM REFLEX MAGNESIUM: 3.8 MMOL/L (ref 3.5–5.1)
PRO-BNP: 248 PG/ML (ref 0–124)
RAPID INFLUENZA  B AGN: NEGATIVE
RAPID INFLUENZA A AGN: NEGATIVE
RBC # BLD: 3.68 M/UL (ref 4–5.2)
SARS-COV-2, NAAT: DETECTED
SODIUM BLD-SCNC: 134 MMOL/L (ref 136–145)
TOTAL PROTEIN: 7.5 G/DL (ref 6.4–8.2)
TROPONIN: <0.01 NG/ML
WBC # BLD: 4.8 K/UL (ref 4–11)

## 2021-11-22 PROCEDURE — 84484 ASSAY OF TROPONIN QUANT: CPT

## 2021-11-22 PROCEDURE — 99285 EMERGENCY DEPT VISIT HI MDM: CPT

## 2021-11-22 PROCEDURE — 85025 COMPLETE CBC W/AUTO DIFF WBC: CPT

## 2021-11-22 PROCEDURE — 71045 X-RAY EXAM CHEST 1 VIEW: CPT

## 2021-11-22 PROCEDURE — 87635 SARS-COV-2 COVID-19 AMP PRB: CPT

## 2021-11-22 PROCEDURE — 87804 INFLUENZA ASSAY W/OPTIC: CPT

## 2021-11-22 PROCEDURE — 80053 COMPREHEN METABOLIC PANEL: CPT

## 2021-11-22 PROCEDURE — 6370000000 HC RX 637 (ALT 250 FOR IP): Performed by: NURSE PRACTITIONER

## 2021-11-22 PROCEDURE — 83880 ASSAY OF NATRIURETIC PEPTIDE: CPT

## 2021-11-22 PROCEDURE — 2580000003 HC RX 258: Performed by: NURSE PRACTITIONER

## 2021-11-22 RX ORDER — GUAIFENESIN 600 MG/1
600 TABLET, EXTENDED RELEASE ORAL ONCE
Status: COMPLETED | OUTPATIENT
Start: 2021-11-23 | End: 2021-11-23

## 2021-11-22 RX ORDER — GUAIFENESIN 600 MG/1
600 TABLET, EXTENDED RELEASE ORAL 2 TIMES DAILY
Qty: 30 TABLET | Refills: 0 | Status: SHIPPED | OUTPATIENT
Start: 2021-11-22 | End: 2021-12-07

## 2021-11-22 RX ORDER — GUAIFENESIN/DEXTROMETHORPHAN 100-10MG/5
5 SYRUP ORAL 3 TIMES DAILY PRN
Qty: 120 ML | Refills: 0 | Status: SHIPPED | OUTPATIENT
Start: 2021-11-22 | End: 2021-12-02

## 2021-11-22 RX ORDER — ONDANSETRON 2 MG/ML
4 INJECTION INTRAMUSCULAR; INTRAVENOUS EVERY 30 MIN PRN
Status: DISCONTINUED | OUTPATIENT
Start: 2021-11-22 | End: 2021-11-23 | Stop reason: HOSPADM

## 2021-11-22 RX ORDER — ACETAMINOPHEN 500 MG
1000 TABLET ORAL ONCE
Status: COMPLETED | OUTPATIENT
Start: 2021-11-22 | End: 2021-11-22

## 2021-11-22 RX ORDER — 0.9 % SODIUM CHLORIDE 0.9 %
1000 INTRAVENOUS SOLUTION INTRAVENOUS ONCE
Status: COMPLETED | OUTPATIENT
Start: 2021-11-22 | End: 2021-11-22

## 2021-11-22 RX ORDER — ONDANSETRON 4 MG/1
4 TABLET, ORALLY DISINTEGRATING ORAL EVERY 8 HOURS PRN
Qty: 20 TABLET | Refills: 0 | Status: SHIPPED | OUTPATIENT
Start: 2021-11-22 | End: 2022-05-11

## 2021-11-22 RX ADMIN — SODIUM CHLORIDE 1000 ML: 9 INJECTION, SOLUTION INTRAVENOUS at 21:14

## 2021-11-22 RX ADMIN — ACETAMINOPHEN 1000 MG: 500 TABLET ORAL at 21:14

## 2021-11-22 ASSESSMENT — PAIN SCALES - GENERAL
PAINLEVEL_OUTOF10: 6
PAINLEVEL_OUTOF10: 8

## 2021-11-22 ASSESSMENT — PAIN DESCRIPTION - DESCRIPTORS: DESCRIPTORS: ACHING

## 2021-11-22 ASSESSMENT — PAIN DESCRIPTION - PAIN TYPE: TYPE: ACUTE PAIN

## 2021-11-22 ASSESSMENT — PAIN DESCRIPTION - LOCATION: LOCATION: HEAD

## 2021-11-23 ENCOUNTER — HOSPITAL ENCOUNTER (OUTPATIENT)
Dept: NURSING | Age: 61
Setting detail: INFUSION SERIES
Discharge: HOME | End: 2021-11-23
Payer: MEDICAID

## 2021-11-23 VITALS
RESPIRATION RATE: 16 BRPM | HEART RATE: 59 BPM | TEMPERATURE: 98.3 F | SYSTOLIC BLOOD PRESSURE: 143 MMHG | DIASTOLIC BLOOD PRESSURE: 51 MMHG | OXYGEN SATURATION: 99 %

## 2021-11-23 VITALS
WEIGHT: 126 LBS | OXYGEN SATURATION: 99 % | DIASTOLIC BLOOD PRESSURE: 62 MMHG | HEIGHT: 63 IN | BODY MASS INDEX: 22.32 KG/M2 | HEART RATE: 62 BPM | RESPIRATION RATE: 17 BRPM | SYSTOLIC BLOOD PRESSURE: 142 MMHG | TEMPERATURE: 99.4 F

## 2021-11-23 PROCEDURE — 94760 N-INVAS EAR/PLS OXIMETRY 1: CPT

## 2021-11-23 PROCEDURE — 6360000002 HC RX W HCPCS: Performed by: NURSE PRACTITIONER

## 2021-11-23 PROCEDURE — M0243 CASIRIVI AND IMDEVI INFUSION: HCPCS

## 2021-11-23 PROCEDURE — 99211 OFF/OP EST MAY X REQ PHY/QHP: CPT

## 2021-11-23 PROCEDURE — 6370000000 HC RX 637 (ALT 250 FOR IP): Performed by: NURSE PRACTITIONER

## 2021-11-23 PROCEDURE — 2580000003 HC RX 258: Performed by: NURSE PRACTITIONER

## 2021-11-23 RX ORDER — DIPHENHYDRAMINE HYDROCHLORIDE 50 MG/ML
50 INJECTION INTRAMUSCULAR; INTRAVENOUS
Status: ACTIVE | OUTPATIENT
Start: 2021-11-23 | End: 2021-11-23

## 2021-11-23 RX ORDER — SODIUM CHLORIDE 9 MG/ML
20 INJECTION, SOLUTION INTRAVENOUS CONTINUOUS PRN
Status: ACTIVE | OUTPATIENT
Start: 2021-11-23 | End: 2021-11-23

## 2021-11-23 RX ORDER — SODIUM CHLORIDE 9 MG/ML
100 INJECTION, SOLUTION INTRAVENOUS CONTINUOUS PRN
Status: DISCONTINUED | OUTPATIENT
Start: 2021-11-23 | End: 2021-11-24 | Stop reason: HOSPADM

## 2021-11-23 RX ORDER — EPINEPHRINE 1 MG/ML
0.3 INJECTION, SOLUTION, CONCENTRATE INTRAVENOUS PRN
Status: DISCONTINUED | OUTPATIENT
Start: 2021-11-23 | End: 2021-11-24 | Stop reason: HOSPADM

## 2021-11-23 RX ORDER — METHYLPREDNISOLONE SODIUM SUCCINATE 125 MG/2ML
125 INJECTION, POWDER, LYOPHILIZED, FOR SOLUTION INTRAMUSCULAR; INTRAVENOUS
Status: ACTIVE | OUTPATIENT
Start: 2021-11-23 | End: 2021-11-23

## 2021-11-23 RX ADMIN — CASIRIVIMAB AND IMDEVIMAB: 600; 600 INJECTION, SOLUTION, CONCENTRATE INTRAVENOUS at 10:20

## 2021-11-23 RX ADMIN — GUAIFENESIN 600 MG: 600 TABLET, EXTENDED RELEASE ORAL at 00:14

## 2021-11-23 RX ADMIN — SODIUM CHLORIDE 20 ML/HR: 9 INJECTION, SOLUTION INTRAVENOUS at 10:19

## 2021-11-23 ASSESSMENT — PAIN SCALES - GENERAL: PAINLEVEL_OUTOF10: 5

## 2021-11-23 NOTE — ED NOTES
Provided d/ instructions and follow up plan of care. Educated on use of rengen for covid tx. Instructed to contact the infusion center in the AM, verbalizes understanding. Transported home by friend provide instructional use on pulse-ox and when to return to the ER.       Davi Andrade RN  11/23/21 [id number]

## 2021-11-23 NOTE — ED NOTES
Patient ambulated with slightly unsteady gait, Seth Jaime NP aware     Rojelio Pressley, RN  11/22/21 [id number]

## 2021-11-23 NOTE — PROGRESS NOTES
[facility] infusing   VSS  Pt denies adverse effects   Continuous monitoring in progress  No changes in assessment

## 2021-11-23 NOTE — ED PROVIDER NOTES
Evaluated by Advanced Practice Provider    [facility]  ED      CHIEF COMPLAINT  Hyperglycemia (BS pta 445 and feeling fatigued. patient states she also fell today because she was weak, denies loc)    HISTORY OF PRESENT ILLNESS  Kaylen Joseph is a 57 y.o. female who presents to the ED complaining of elevated blood sugar. Reports being really tired and dizzy and stuff. States she usually does not feel like this with high blood sugar. She reports she has been bumping into things. Noticed this around 9 am today. Dizziness is described as being \"just dizzy\". Denies room spinning, denies feeling like she might pass out, states it does feel like she is unsteady on her feet. Denies CP, abdominal pain, nausea, vomiting, diarrhea. Reports feeling weak all over. SOB started this morning. She is congested, states it is sinuses, was given flonase. Congestion for about a day. No cough. Denies sore throat. Denies fevers, chills, sweats. Patient also apparently fell because of her weakness. The patient is currently rating their pain as 8/10 and describes it as a headache type of pain. Treatments tried prior to arrival in the ED include: none. The patient arrived to the ED via EMS transport. PAST MEDICAL HISTORY    Past Medical History:   Diagnosis Date    Abnormal brain MRI 7/20/2017    Partially empty sella and minimal chronic small vessel ischemic disease    Acute bilateral low back pain without sciatica 11/2/2016    SHARRON (acute kidney injury) ([facility] [address] 7/5/2017    Arthritis     back    Bipolar disorder ([facility] [address] 10/18/2008    CAD (coronary artery disease)     stent placed 6/8/20    Cancer [facility]) 2015    bilateral breast:s/p lumpectomy/radiation:under care care of breast specialist:Dr. Boone     Carotid stenosis, bilateral:<50%:per [place] 7/2016 7/15/2016    Carpal tunnel syndrome 10/18/2008    Cervical cancer screening 2014    Nml per pt'.     Coronary artery disease of native artery of native heart Take 1 tablet by mouth 2 times daily for 15 days 30 tablet 0    ondansetron (ZOFRAN ODT) 4 MG disintegrating tablet Take 1 tablet by mouth every 8 hours as needed for Nausea 20 tablet 0    guaiFENesin-dextromethorphan (ROBITUSSIN DM) 100-10 MG/5ML syrup Take 5 mLs by mouth 3 times daily as needed for Cough 120 mL 0    lisinopril (PRINIVIL;ZESTRIL) 40 MG tablet Take 1 tablet by mouth daily 90 tablet 1    atorvastatin (LIPITOR) 20 MG tablet TAKE 1 TABLET BY MOUTH EVERY DAY 30 tablet 5    ticagrelor (BRILINTA) 90 MG TABS tablet Take 1 tablet by mouth 2 times daily 60 tablet 1    insulin lispro, 1 Unit Dial, (HUMALOG KWIKPEN) 100 UNIT/ML SOPN Inject 9 Units into the skin 3 times daily       glimepiride (AMARYL) 2 MG tablet 2 mg with breakfast or first meal of the day 30 tablet 5    fluticasone (FLONASE) 50 MCG/ACT nasal spray 1 spray by Each Nostril route daily 1 Bottle 1    insulin glargine (LANTUS SOLOSTAR) 100 UNIT/ML injection pen Inject 35 Units into the skin nightly (Patient taking differently: Inject 26 Units into the skin every morning ) 1 pen 1    VICTOZA 18 MG/3ML SOPN SC injection INJECT 1.8 MG UNDER THE SKIN ONCE DAILY      gabapentin (NEURONTIN) 600 MG tablet Take 0.5 tablets by mouth 2 times daily for 3 days. (Patient taking differently: Take 600 mg by mouth 3 times daily. ) 90 tablet 3    paliperidone (INVEGA) 9 MG extended release tablet Take 9 mg by mouth every morning       pantoprazole (PROTONIX) 40 MG tablet Take 40 mg by mouth daily       oxyCODONE-acetaminophen (PERCOCET) 7.5-325 MG per tablet TAKE 1 TABLET BY MOUTH EVERY 6 (SIX) HOURS AS NEEDED FOR UP TO 28 DAYS.  clonazePAM (KLONOPIN) 0.5 MG tablet Take 0.5 mg by mouth 3 times daily as needed.       calcium carbonate-vitamin D (CALTRATE) 600-400 MG-UNIT TABS per tab Take 1 tablet by mouth daily       aspirin 81 MG tablet Take 81 mg by mouth daily      traZODone (DESYREL) 100 MG tablet Take 100 mg by mouth nightly      nitroGLYCERIN (NITROSTAT) 0.4 MG SL tablet up to max of 3 total doses.  If no relief after 1 dose, call 911. 25 tablet 0    TRUE METRIX BLOOD GLUCOSE TEST strip USE AS DIRECTED TO TEST 4 TIMES A  strip 5    Blood Glucose Monitoring Suppl (TRUE METRIX METER) w/Device KIT Used to  check blood sugar 4 times eyad 1 kit 1    ferrous sulfate (IRON 325) 325 (65 Fe) MG tablet Take 325 mg by mouth daily (with breakfast)         ALLERGIES    Allergies   Allergen Reactions    Morphine Anaphylaxis and Hives     feels like throat is closing    Penicillins Hives and Swelling    Codeine Hives and Rash    Penicillin G Rash       FAMILY HISTORY    Family History   Problem Relation Age of Onset    Cancer Mother         breast    Cancer Father     Heart Failure Neg Hx     High Cholesterol Neg Hx     Hypertension Neg Hx     Migraines Neg Hx     Rashes/Skin Problems Neg Hx     Seizures Neg Hx     Stroke Neg Hx     Thyroid Disease Neg Hx     Diabetes Neg Hx        SOCIAL HISTORY    Social History     Socioeconomic History    Marital status:      Spouse name: None    Number of children: 1    Years of education: None    Highest education level: None   Occupational History    Occupation:    Tobacco Use    Smoking status: Former Smoker     Packs/day: 0.50     Years: 20.00     Pack years: 10.00     Types: Cigarettes, Cigars     Quit date: 7/3/2014     Years since quittin.3    Smokeless tobacco: Never Used   Vaping Use    Vaping Use: Never used   Substance and Sexual Activity    Alcohol use: No     Alcohol/week: 0.0 standard drinks    Drug use: No    Sexual activity: None   Other Topics Concern    None   Social History Narrative    None     Social Determinants of Health     Financial Resource Strain:     Difficulty of Paying Living Expenses: Not on file   Food Insecurity:     Worried About Running Out of Food in the Last Year: Not on file    Tabby of Food in the Last Year: Not on file   Transportation Needs:     Lack of Transportation (Medical): Not on file    Lack of Transportation (Non-Medical): Not on file   Physical Activity:     Days of Exercise per Week: Not on file    Minutes of Exercise per Session: Not on file   Stress:     Feeling of Stress : Not on file   Social Connections:     Frequency of Communication with Friends and Family: Not on file    Frequency of Social Gatherings with Friends and Family: Not on file    Attends [employer] Services: Not on file    Active Member of [address] [place] [address] [employer] or Organizations: Not on file    Attends Club or Organization Meetings: Not on file    Marital Status: Not on file   Intimate Partner Violence:     Fear of Current or Ex-Partner: Not on file    Emotionally Abused: Not on file    Physically Abused: Not on file    Sexually Abused: Not on file   Housing Stability:     Unable to Pay for Housing in the Last Year: Not on file    Number of [place] in the Last Year: Not on file    Unstable Housing in the Last Year: Not on file     REVIEW OF SYSTEMS    10 systems reviewed, pertinent positives per HPI otherwise noted to be negative    PHYSICAL EXAM  Vitals:    11/23/21 0000   BP: (!) 142/62   Pulse: 62   Resp: 17   Temp:    SpO2: 99%     GENERAL: Patient is thin. Awake and alert. Cooperative. Resting in bed. Appears ill but is not toxic in appearance. HEAD: Normocephalic. Atraumatic. No facial asymmetry upon exam. Sensation is intact to light touch to the face. Smile is symmetrical, tongue is midline. Uvula is midline and elevates appropriately. Posterior oropharynx is without erythema, edema, or exudate. EYES: Sclera is non-icteric. Conjunctiva normal. EOMI, PERRL. No nystagmus. ENT: External ears are normal. Mucous membranes are moist.   NECK: Supple. Normal ROM. Trachea mid-line. LUNGS: Equal and symmetric chest rise. Breathing is unlabored. Speaking comfortably in full sentences. Lungs are clear bilaterally to auscultation.   Without wheezing, rales, or rhonchi. CADIOVASCULAR:  Regular rate and rhythm. Normal S1-S2 sounds. No murmurs, rubs, or gallops. Capillary refill is brisk in all 4 extremities. Bilateral lower extremities are equal in size, there is no swelling observed. There is no tenderness to palpation. There is no erythema observed or warmth palpated. GI: Soft, nontender, nondistended with positive bowel sounds. No rebound tenderness, guarding or any peritoneal signs. No masses or hepatosplenomegaly    MUSCULOSKELETAL:  No gross deformities or trauma noted. Moving all extremities equally and appropriately. Normal ROM. SKIN: Warm/dry. Skin is intact. No rashes/lesions noted. PSYCHIATRIC: Mood and affect appropriate. Speech is clear and articulate. NEUROLOGICAL: Alert and oriented x3. Strength is weak, bilateral upper and lower extremities equally weak. No focal motor or sensory deficits. Romberg is negative. No pronator drift. Finger to nose is intact bilaterally. Heel to shin is intact bilaterally. She was unsteady standing on her feet, I did not personally ambulate her. LABS  I have reviewed all labs for this visit.    Results for orders placed or performed during the hospital encounter of 11/22/21   Rapid influenza A/B antigens    Specimen: Nasopharyngeal   Result Value Ref Range    Rapid Influenza A Ag Negative Negative    Rapid Influenza B Ag Negative Negative   COVID-19, Rapid    Specimen: Nasopharyngeal Swab   Result Value Ref Range    SARS-CoV-2, NAAT DETECTED (AA) Not Detected   CBC Auto Differential   Result Value Ref Range    WBC 4.8 4.0 - 11.0 K/uL    RBC 3.68 (L) 4.00 - 5.20 M/uL    Hemoglobin 10.7 (L) 12.0 - 16.0 g/dL    Hematocrit 33.2 (L) 36.0 - 48.0 %    MCV 90.4 80.0 - 100.0 fL    MCH 29.1 26.0 - 34.0 pg    MCHC 32.2 31.0 - 36.0 g/dL    RDW 12.0 (L) 12.4 - 15.4 %    Platelets 030 694 - 661 K/uL    MPV 8.8 5.0 - 10.5 fL    Neutrophils % 69.8 %    Lymphocytes % 20.0 %    Monocytes % 9.6 %    Eosinophils % 0.4 %    Basophils % 0.2 %    Neutrophils Absolute 3.3 1.7 - 7.7 K/uL    Lymphocytes Absolute 1.0 1.0 - 5.1 K/uL    Monocytes Absolute 0.5 0.0 - 1.3 K/uL    Eosinophils Absolute 0.0 0.0 - 0.6 K/uL    Basophils Absolute 0.0 0.0 - 0.2 K/uL   Comprehensive Metabolic Panel w/ Reflex to MG   Result Value Ref Range    Sodium 134 (L) 136 - 145 mmol/L    Potassium reflex Magnesium 3.8 3.5 - 5.1 mmol/L    Chloride 98 (L) 99 - 110 mmol/L    CO2 26 21 - 32 mmol/L    Anion Gap 10 3 - 16    Glucose 306 (H) 70 - 99 mg/dL    BUN 14 7 - 20 mg/dL    CREATININE 1.1 0.6 - 1.2 mg/dL    GFR Non-African American 50 (A) >60    GFR African American >60 >60    Calcium 9.3 8.3 - 10.6 mg/dL    Total Protein 7.5 6.4 - 8.2 g/dL    Albumin 3.6 3.4 - 5.0 g/dL    Albumin/Globulin Ratio 0.9 (L) 1.1 - 2.2    Total Bilirubin <0.2 0.0 - 1.0 mg/dL    Alkaline Phosphatase 170 (H) 40 - 129 U/L    ALT 57 (H) 10 - 40 U/L    AST 24 15 - 37 U/L   Troponin   Result Value Ref Range    Troponin <0.01 <0.01 ng/mL   Brain Natriuretic Peptide   Result Value Ref Range    Pro- (H) 0 - 124 pg/mL   POCT Glucose   Result Value Ref Range    POC Glucose 384 (H) 70 - 99 mg/dl    Performed on ACCU-CHEK    POCT Glucose   Result Value Ref Range    POC Glucose 223 (H) 70 - 99 mg/dl    Performed on ACCU-CHEK        RADIOLOGY    XR CHEST PORTABLE    Result Date: 11/22/2021  EXAMINATION: ONE XRAY VIEW OF THE CHEST 11/22/2021 7:34 pm COMPARISON: 11/02/2021. HISTORY: ORDERING SYSTEM PROVIDED HISTORY: SOB TECHNOLOGIST PROVIDED HISTORY: Reason for exam:->SOB Reason for Exam: sob Acuity: Acute Type of Exam: Initial FINDINGS: Overlying items external to the patient somewhat limit evaluation. Patient is rotated. Lungs are clear. Cardiac and mediastinal silhouettes are within normal limits. No pneumothoraces. Bony structures appear intact. Surgical clips right upper quadrant redemonstrated. No acute abnormality. ED COURSE/MDM  Patient seen and evaluated.  Old records reviewed. Diagnostic testing reviewed and results discussed. I have evaluated this patient. My supervising physician was available for consultation. Colleen Roach presented to the ED today with above noted complaints. Arrival vital signs: Afebrile and hemodynamically stable except for blood pressure slightly hypertensive at 160/62. Well saturated on room air. Physical exam performed at 1934: Patient appears to not feel well, she is neurologically intact and without focal or lateralizing deficits on exam but she is overall weak and needed assistance standing when I was evaluating her. Blood work: Without evidence of systemic infection. Stable anemia. No significant electrolyte abnormality. Glucose elevated at 306. Anion gap is normal.  Creatinine normal at 1.1, GFR low at 50. Alk phos elevated at 170, ALT elevated at 57. AST normal.  Troponin is normal.  BNP elevated at 248. Rapid flu swab obtained and negative. Covid swab obtained and positive. Imaging: Chest x-ray is without acute findings. I did inform the patient that her Covid swab was positive. Nursing staff did ambulate her on room air and she was able to maintain her O2 saturations well. They also felt that the patient was unsteady with ambulation. She is being given 1 L IV fluids, Tylenol, Zofran and I will reevaluate. Upon reevaluation the patient states she is feeling somewhat better. I advised her that she does not currently meet criteria for admission as her O2 levels have remained well. Diagnostic studies are also not showing findings of systemic infection and pneumonia. Patient is agreeable to discharge at this time. Blood sugar has improved with fluids. She is no concerns for DKA at this time. I am ordering RegenCov infusion that patient can obtain as an outpatient through our infusion center. Order was faxed to infusion center and patient was given information regarding this and how to follow-up.   She is also being discharged with a pulse oximeter for her O2 monitoring. Patient was given prescriptions to help with symptom control and she was given strict ED return precautions. Patient verbalized her agreement understanding with this above plan. Medications given in the ED:   Medications   ondansetron (ZOFRAN) injection 4 mg (has no administration in time range)   0.9 % sodium chloride bolus (0 mLs IntraVENous Stopped 11/22/21 2214)   acetaminophen (TYLENOL) tablet 1,000 mg (1,000 mg Oral Given 11/22/21 2114)   guaiFENesin (MUCINEX) extended release tablet 600 mg (600 mg Oral Given 11/23/21 0014)        At this point I do not feel the patient requires further work up and it is reasonable to discharge the patient. Please refer to AVS for further details regarding discharge instructions. A discussion was had with the patient regarding diagnosis, diagnostic testing results, treatment/ plan of care, and follow up. All questions were answered. Patient will follow up as directed for further evaluation/treatment. The patient was given strict return precautions as we discussed symptoms that would necessitate return to the ED. Patient will return to ED for new/worsening symptoms. The patient verbalized their understanding and agreement with the above plan. I estimate there is LOW risk for EPIGLOTTITIS, PNEUMONIA, MENINGITIS, OR URINARY TRACT INFECTION, thus I consider the discharge disposition reasonable. Also, there is no evidence or peritonitis, sepsis, or toxicity. Kaylen Joseph and I have discussed the diagnosis and risks, and we agree with discharging home to follow-up with their primary doctor. We also discussed returning to the Emergency Department immediately if new or worsening symptoms occur. We have discussed the symptoms which are most concerning (e.g., changing or worsening pain, trouble swallowing or breating, neck stiffness, fever) that necessitate immediate return.     Patient was sent home with a prescription for below medication/s. I did [place] patient on appropriate use of these medication. Discharge Medication List as of 11/23/2021 12:03 AM      START taking these medications    Details   guaiFENesin (MUCINEX) 600 MG extended release tablet Take 1 tablet by mouth 2 times daily for 15 days, Disp-30 tablet, R-0Normal      ondansetron (ZOFRAN ODT) 4 MG disintegrating tablet Take 1 tablet by mouth every 8 hours as needed for Nausea, Disp-20 tablet, R-0Normal      guaiFENesin-dextromethorphan (ROBITUSSIN DM) 100-10 MG/5ML syrup Take 5 mLs by mouth 3 times daily as needed for Cough, Disp-120 mL, R-0Normal             CLINICAL IMPRESSION    1. COVID-19    2. Complaint of nasal congestion    3. Type 2 diabetes mellitus with hyperglycemia, with long-term current use of insulin ([facility])           Discharge Vitals:  Blood pressure (!) 142/62, pulse 62, temperature 99.4 °F (37.4 °C), temperature source Oral, resp. rate 17, height 5' 3\" (1.6 m), weight 126 lb (57.2 kg), SpO2 99 %, not currently breastfeeding. FOLLOW UP  Massimo Chino  [address] [address]  [phone]    Schedule an appointment as soon as possible for a visit in 1 week  For further evaluation    [facility]  ED  [address]  Go to   If symptoms worsen      DISPOSITION  Patient was discharged to home in good condition. Comment: Please note this report has been produced using speech recognition software and may contain errors related to that system including errors in grammar, punctuation, and spelling, as well as words and phrases that may be inappropriate. If there are any questions or concerns please feel free to contact the dictating provider for clarification.        Kiki Zuleta, APRN - CNP  11/23/21 3024

## 2021-11-23 NOTE — ED NOTES
Patient ambulated in hallway, tolerated well.   O2 while ambulating 97%-100%     Parker Mishra RN  11/22/21 3181

## 2021-11-23 NOTE — PROGRESS NOTES
Regeneron infusion initiated  with no adverse effects  VSS  Iv site is without evidence of infiltration   Pt sitting up in chair with vital sign monitoring Q15 minutes  Pt in no immediate distress   Pt is experiencing cold like symptoms/ fatigue/malaise   Encourage good hydration /nutrition   Encouraged the pt to call MD/and or go to ER  if patients symptoms worsen

## 2021-11-23 NOTE — PROGRESS NOTES
Infusion went well, no adverse reactions noted. Lanetta Collet has remained sleepy resting in reclined chair. Given warm blankets and ice water for comfort. Now in observation mode.

## 2021-11-24 ENCOUNTER — CARE COORDINATION (OUTPATIENT)
Dept: CARE COORDINATION | Age: 61
End: 2021-11-24

## 2021-11-24 NOTE — CARE COORDINATION
Patient contacted regarding COVID-19 diagnosis, pulse oximeter ordered at discharge and monoclonal antibody infusion follow up. Discussed COVID-19 related testing which was available at this time. Test results were positive. Patient informed of results, if available? Yes. New Medications:  guaiFENesin (MUCINEX) 600 MG extended release tablet 30 tablet 0 2021    Sig - Route: Take 1 tablet by mouth 2 times daily for 15 days - Oral      ondansetron (ZOFRAN ODT) 4 MG disintegrating tablet 20 tablet 0 2021     Sig - Route: Take 1 tablet by mouth every 8 hours as needed for Nausea - Oral      guaiFENesin-dextromethorphan (ROBITUSSIN DM) 100-10 MG/5ML syrup 120 mL 0 2021    Sig - Route: Take 5 mLs by mouth 3 times daily as needed for Cough - Oral        Ambulatory Care Manager contacted the patient by telephone to perform post discharge assessment. Call within 2 business days of discharge: Yes. Verified name and  with patient as identifiers. Provided introduction to self, and explanation of the CTN/ACM role, and reason for call due to risk factors for infection and/or exposure to COVID-19. Symptoms reviewed with patient who verbalized the following symptoms: fatigue, pain or aching joints, cough, chills or shaking, sweating, diarrhea, no new symptoms and no worsening symptoms. Instructed pt to stay hydrated, rest, take Tylenol or Ibuprofen for fever or body aches an try to eat at least small meals. Due to no new or worsening symptoms encounter was not routed to provider for escalation. Discussed follow-up appointments. If no appointment was previously scheduled, appointment scheduling offered: Pt will follow up with her CNP, Allison Levy.     [address] follow up appointment(s):   Future Appointments   Date Time Provider Afia Gill   2022 12:00 PM Leann Heredia MD [facility]     Non-face-to-face services provided:  Obtained and reviewed discharge summary and/or continuity of care documents     Advance Care Planning:   Does patient have an Advance Directive:  reviewed and current. Educated patient about risk for severe COVID-19 due to risk factors according to CDC guidelines. ACM reviewed discharge instructions, medical action plan and red flag symptoms with the patient who verbalized understanding. Discussed COVID vaccination status: No. Education provided on COVID-19 vaccination as appropriate. Discussed exposure protocols and quarantine with CDC Guidelines. Patient was given an opportunity to verbalize any questions and concerns and agrees to contact ACM or health care provider for questions related to their healthcare. Reviewed and educated patient on any new and changed medications related to discharge diagnosis     Was patient discharged with a pulse oximeter? Yes Discussed and confirmed pulse oximeter discharge instructions and when to notify provider or seek emergency care. ACM provided contact information. Plan for follow-up call in 5-7 days based on severity of symptoms and risk factors.

## 2021-12-01 ENCOUNTER — CARE COORDINATION (OUTPATIENT)
Dept: CARE COORDINATION | Age: 61
End: 2021-12-01

## 2021-12-01 NOTE — CARE COORDINATION
Patient resolved from the Care Transitions episode on 12/1/21  Discussed COVID-19 related testing which was available at this time. Test results were positive. Patient informed of results, if available? Pt was made aware at the hospital on 11/22/21. Patient/family has been provided the following resources and education related to COVID-19:                         Signs, symptoms and red flags related to COVID-19            CDC exposure and quarantine guidelines            [employer] exposure contact - [phone]            Contact for their local Department of Health                 Pt stated she has followed up with her PCP. Patient currently reports that the following symptoms have improved:  cough, confusion or unusual change in mental status, sweating and no new/worsening symptoms     No further outreach scheduled with this CTN/ACM. Episode of Care resolved. Patient has this CTN/ACM contact information if future needs arise.

## 2021-12-09 RX ORDER — LIRAGLUTIDE 6 MG/ML
INJECTION SUBCUTANEOUS
Qty: 2 PEN | Refills: 5 | Status: SHIPPED | OUTPATIENT
Start: 2021-12-09 | End: 2022-07-22 | Stop reason: SDUPTHER

## 2021-12-09 NOTE — TELEPHONE ENCOUNTER
Requested Prescriptions     Pending Prescriptions Disp Refills    VICTOZA 18 MG/3ML SOPN SC injection [Pharmacy Med Name: Daisy Ramirez 3-PAZ 18 MG/3 ML PEN] 2 pen 5     Sig: INJECT 1.8 MG UNDER THE SKIN ONCE DAILY     Last refilled:6/8/2021  Last seen: 10/6/2021  Follow up: 1/12/2022

## 2021-12-10 ENCOUNTER — TELEPHONE (OUTPATIENT)
Dept: ENDOCRINOLOGY | Age: 61
End: 2021-12-10

## 2021-12-10 RX ORDER — TICAGRELOR 90 MG/1
TABLET ORAL
Qty: 60 TABLET | Refills: 11 | Status: SHIPPED | OUTPATIENT
Start: 2021-12-10 | End: 2022-04-18 | Stop reason: SDUPTHER

## 2021-12-10 NOTE — TELEPHONE ENCOUNTER
Received documentation from pharmacy that they will not cover pt Victoza after pt has tried metformin. Documentation has been scanned in.

## 2021-12-28 PROBLEM — F31.32 BIPOLAR AFFECTIVE DISORDER, CURRENTLY DEPRESSED, MODERATE (HCC): Status: ACTIVE | Noted: 2021-10-27

## 2021-12-28 PROBLEM — J30.2 SEASONAL ALLERGIES: Status: ACTIVE | Noted: 2021-10-27

## 2022-01-12 ENCOUNTER — VIRTUAL VISIT (OUTPATIENT)
Dept: ENDOCRINOLOGY | Age: 62
End: 2022-01-12
Payer: COMMERCIAL

## 2022-01-12 DIAGNOSIS — Z79.4 TYPE 2 DIABETES MELLITUS WITH DIABETIC NEPHROPATHY, WITH LONG-TERM CURRENT USE OF INSULIN (HCC): ICD-10-CM

## 2022-01-12 DIAGNOSIS — E11.21 TYPE 2 DIABETES MELLITUS WITH DIABETIC NEPHROPATHY, WITH LONG-TERM CURRENT USE OF INSULIN (HCC): ICD-10-CM

## 2022-01-12 DIAGNOSIS — E11.29 TYPE 2 DIABETES MELLITUS WITH OTHER DIABETIC KIDNEY COMPLICATION (HCC): ICD-10-CM

## 2022-01-12 PROCEDURE — 99441 PR PHYS/QHP TELEPHONE EVALUATION 5-10 MIN: CPT | Performed by: INTERNAL MEDICINE

## 2022-01-12 RX ORDER — GLIMEPIRIDE 4 MG/1
TABLET ORAL
Qty: 30 TABLET | Refills: 5 | Status: SHIPPED | OUTPATIENT
Start: 2022-01-12 | End: 2022-05-20 | Stop reason: SDUPTHER

## 2022-01-12 NOTE — PROGRESS NOTES
Patient ID:   Antoni Vicente is a 57 y.o. female  Chief Complaint:   Antoni Vicente presents for an evaluation of Type 2 Diabetes Mellitus , Hyperlipidemia and hypertension. Pursuant to the emergency declaration under the [facility], [facility] authority and the [employer] Act this Telephone Visit was insisted, with patient's consent, to reduce the patient's risk of exposure to COVID-19 and provide continuity of care for an established patient. Services were provided through a synchronous discussion over a telephone to substitute for in-person clinic visit. Subjective:   Type 2 Diabetes Mellitus diagnosed around 2010  On insulin since 2012  Previous regimen:Taking Admelog 15 units tidac and 5 units for snacks. Basaglar 40 units once a day at night. VGO stopped due to hypoglycemias     Multiple hospitalizations in 2021 due to weakness, anemia   She is recently released from a nursing home     Not taking:   Synjardy 12.5-1000mg, two tabs in am. Last pick ups: Aug 2020 and Feb 2021. Admits making poor dietary choices, trying to cut down fried. Currently on  Lantus 26 units daily in am.  Victoza 1.8 mg daily in AM  Glimepiride 2 mg with breakfast or first meal of the day   Humalog SSI as needed      BS Readings:    1/12  Am-96    1/11  AM-93  Ally Grew    1/10  AM-267  LUNCH-306  GZNNCC-131    1/9  AM-91  GNWVU-162  DINNER- 298    1/8  AM-98  TCPSQ-982  MXNEDE-025    She thinks she is eating too much bread     Checks blood sugars 2-3 times per day. Reportedly 98 - 216 (down from 106-400)   AM:     Lunch:   Supper:    HS:        Hypoglycemias: Once in last 4 weeks      Meals: 3, dinner is big. Snacks (jellos, fruits, pretzels) after every meal because she is hungry. Exercise: None    Denies chest pain, exertional dyspnea. Family history of CAD: Both parents  Denies smoking/ alcohol. The following portions of the patient's history were reviewed and updated as appropriate:       Family History   Problem Relation Age of Onset    Cancer Mother         breast    Cancer Father     Heart Failure Neg Hx     High Cholesterol Neg Hx     Hypertension Neg Hx     Migraines Neg Hx     Rashes/Skin Problems Neg Hx     Seizures Neg Hx     Stroke Neg Hx     Thyroid Disease Neg Hx     Diabetes Neg Hx          Social History     Socioeconomic History    Marital status:      Spouse name: Not on file    Number of children: 1    Years of education: Not on file    Highest education level: Not on file   Occupational History    Occupation:    Tobacco Use    Smoking status: Former Smoker     Packs/day: 0.50     Years: 20.00     Pack years: 10.00     Types: Cigarettes, Cigars     Quit date: 7/3/2014     Years since quittin.5    Smokeless tobacco: Never Used   Vaping Use    Vaping Use: Never used   Substance and Sexual Activity    Alcohol use: No     Alcohol/week: 0.0 standard drinks    Drug use: No    Sexual activity: Not on file   Other Topics Concern    Not on file   Social History Narrative    Not on file     Social Determinants of Health     Financial Resource Strain:     Difficulty of Paying Living Expenses: Not on file   Food Insecurity:     Worried About Running Out of Food in the Last Year: Not on file    Tabby of Food in the Last Year: Not on file   Transportation Needs:     Lack of Transportation (Medical): Not on file    Lack of Transportation (Non-Medical):  Not on file   Physical Activity:     Days of Exercise per Week: Not on file    Minutes of Exercise per Session: Not on file   Stress:     Feeling of Stress : Not on file   Social Connections:     Frequency of Communication with Friends and Family: Not on file    Frequency of Social Gatherings with Friends and Family: Not on file    Attends Holiness Services: Not on file   [employer] or Organizations: Not on file    Attends Club or Organization Meetings: Not on file    Marital Status: Not on file   Intimate Partner Violence:     Fear of Current or Ex-Partner: Not on file    Emotionally Abused: Not on file    Physically Abused: Not on file    Sexually Abused: Not on file   Housing Stability:     Unable to Pay for Housing in the Last Year: Not on file    Number of [place] in the Last Year: Not on file    Unstable Housing in the Last Year: Not on file       Past Medical History:   Diagnosis Date    Abnormal brain MRI 7/20/2017    Partially empty sella and minimal chronic small vessel ischemic disease    Acute bilateral low back pain without sciatica 11/2/2016    SHARRON (acute kidney injury) ([facility] [address] 7/5/2017    Arthritis     back    Bipolar disorder ([facility] [address] 10/18/2008    CAD (coronary artery disease)     stent placed 6/8/20    Cancer [facility]) 2015    bilateral breast:s/p lumpectomy/radiation:under care care of breast specialist:Dr. Boone     Carotid stenosis, bilateral:<50%:per US 7/2016 7/15/2016    Carpal tunnel syndrome 10/18/2008    Cervical cancer screening 2014    Nml per pt'.     Coronary artery disease of native artery of native heart with stable angina pectoris ([facility] [address] 6/9/2020    DDD (degenerative disc disease), lumbar 7/18/2018    Depression     under care of pschiatrist:Dr. 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    Scoliosis     Spondylosis of lumbar region without myelopathy or radiculopathy 3/10/2017    Transient cerebral ischemia 07/15/2016    TIA:7/10/16    Unspecified cerebral artery occlusion with cerebral infarction     TIA       Past Surgical History:   Procedure Laterality Date    BREAST LUMPECTOMY  2015    Bilateral:breast cancer    CARDIAC CATHETERIZATION  06/08/2020    Dr. Anna Rod), DAYANA to Diag 1    COLONOSCOPY N/A 2/1/2021    COLONOSCOPY DIAGNOSTIC performed by Ronnie Clark MD at [place] [address]  2/3/2021    CT BONE MARROW BIOPSY 2/3/2021 Dameon Wang MD [facility] CT SCAN    HYSTERECTOMY      Benign:no cervical cancer per pt'    KIDNEY REMOVAL      right    OTHER SURGICAL HISTORY Right     orif right ankle    TEMPORAL ARTERY BIOPSY Right 8/9/2021    RIGHT TEMPORAL ARTERY BIOPSY LIGATION performed by Bart Silverio MD at [address] N/A 1/29/2021    EGD BIOPSY performed by Ronnie Clark MD at [address]   Allergen Reactions    Morphine Anaphylaxis and Hives     feels like throat is closing    Penicillins Hives and Swelling    Codeine Hives and Rash    Penicillin G Rash         Current Outpatient Medications:     glimepiride (AMARYL) 4 MG tablet, 4 mg with breakfast or first meal of the day, Disp: 30 tablet, Rfl: 5    BRILINTA 90 MG TABS tablet, TAKE 1 TABLET BY MOUTH TWICE A DAY, Disp: 60 tablet, Rfl: 11    VICTOZA 18 MG/3ML SOPN SC injection, INJECT 1.8 MG UNDER THE SKIN ONCE DAILY, Disp: 2 pen, Rfl: 5    Blood Glucose Monitoring Suppl (TRUE METRIX METER) w/Device KIT, Used to  check blood sugar 4 times eyad (Patient taking differently: Used to  check blood sugar 3 times eyad), Disp: 1 kit, Rfl: 1    ondansetron (ZOFRAN ODT) 4 MG disintegrating tablet, Take 1 tablet by mouth every 8 hours as needed for Nausea, Disp: 20 tablet, Rfl: 0    lisinopril (PRINIVIL;ZESTRIL) 40 MG tablet, Take 1 tablet by mouth daily, Disp: 90 tablet, Rfl: 1    atorvastatin (LIPITOR) 20 MG tablet, TAKE 1 TABLET BY MOUTH EVERY DAY, Disp: 30 tablet, Rfl: 5    nitroGLYCERIN (NITROSTAT) 0.4 MG SL tablet, up to max of 3 total doses. If no relief after 1 dose, call 911., Disp: 25 tablet, Rfl: 0    insulin lispro, 1 Unit Dial, (HUMALOG KWIKPEN) 100 UNIT/ML SOPN, Inject 9 Units into the skin 3 times daily , Disp: , Rfl:     fluticasone (FLONASE) 50 MCG/ACT nasal spray, 1 spray by Each Nostril route daily, Disp: 1 Bottle, Rfl: 1    insulin glargine (LANTUS SOLOSTAR) 100 UNIT/ML injection pen, Inject 35 Units into the skin nightly (Patient taking differently: Inject 26 Units into the skin every morning ), Disp: 1 pen, Rfl: 1    TRUE METRIX BLOOD GLUCOSE TEST strip, USE AS DIRECTED TO TEST 4 TIMES A DAY, Disp: 200 strip, Rfl: 5    paliperidone (INVEGA) 9 MG extended release tablet, Take 9 mg by mouth every morning , Disp: , Rfl:     pantoprazole (PROTONIX) 40 MG tablet, Take 40 mg by mouth daily , Disp: , Rfl:     ferrous sulfate (IRON 325) 325 (65 Fe) MG tablet, Take 325 mg by mouth daily (with breakfast), Disp: , Rfl:     oxyCODONE-acetaminophen (PERCOCET) 7.5-325 MG per tablet, TAKE 1 TABLET BY MOUTH EVERY 6 (SIX) HOURS AS NEEDED FOR UP TO 28 DAYS., Disp: , Rfl:     clonazePAM (KLONOPIN) 0.5 MG tablet, Take 0.5 mg by mouth 3 times daily as needed. , Disp: , Rfl:     calcium carbonate-vitamin D (CALTRATE) 600-400 MG-UNIT TABS per tab, Take 1 tablet by mouth daily , Disp: , Rfl:     aspirin 81 MG tablet, Take 81 mg by mouth daily, Disp: , Rfl:     traZODone (DESYREL) 100 MG tablet, Take 200 mg by mouth nightly , Disp: , Rfl:     gabapentin (NEURONTIN) 600 MG tablet, Take 0.5 tablets by mouth 2 times daily for 3 days.  (Patient taking differently: Take 600 mg by mouth 3 times daily. ), Disp: 90 tablet, Rfl: 3      Review of Systems:    Constitutional: Negative for fever, chills, and unexpected weight change. HENT: Negative for congestion, ear pain, rhinorrhea,  sore throat and trouble swallowing. Eyes: Negative for photophobia, redness, itching. Respiratory: Negative for cough, shortness of breath and sputum. Cardiovascular: Negative for chest pain, palpitations and leg swelling. Gastrointestinal: Negative for nausea, vomiting, abdominal pain, diarrhea, constipation. Endocrine: Negative for cold intolerance, heat intolerance, polydipsia, polyphagia and polyuria. Genitourinary: Negative for dysuria, urgency, frequency, hematuria and flank pain. Musculoskeletal: Negative for myalgias, back pain, arthralgias and neck pain. Skin/Nail: Negative for rash, itching. Normal nails. Neurological: Negative for seizures, weakness, light-headedness, numbness and headaches. Hematological/ Lymph nodes: Negative for adenopathy. Does not bruise/bleed easily. Psychiatric/Behavioral: Negative for suicidal ideas, depression, anxiety, sleep disturbance and decreased concentration. Objective:   Physical Exam:  There were no vitals taken for this visit. Constitutional: Patient is oriented to person, place, and time. Patient appears well-developed and well-nourished. HENT:               ULJC: Normocephalic and atraumatic.                Eyes: Conjunctivae and EOM are normal.                Neck: Normal range of motion. Cardiovascular: Normal rate, regular rhythm and normal heart sounds.  heart murmur present. Pulmonary/Chest: Effort normal and breath sounds normal.      Neurological: Patient is alert and oriented to person, place, and time. Skin: Skin is warm and dry. Psychiatric: Patient has a normal mood and affect.  Patient behavior is normal.     Lab Review:    Admission on 11/22/2021, Discharged on 11/23/2021   Component Date Value Ref Range Status    POC Glucose 11/22/2021 384* 70 - 99 mg/dl Final    Performed on 11/22/2021 ACCU-CHEK   Final    WBC 11/22/2021 4.8  4.0 - 11.0 K/uL Final    RBC 11/22/2021 3.68* 4.00 - 5.20 M/uL Final    Hemoglobin 11/22/2021 10.7* 12.0 - 16.0 g/dL Final    Hematocrit 11/22/2021 33.2* 36.0 - 48.0 % Final    MCV 11/22/2021 90.4  80.0 - 100.0 fL Final    MCH 11/22/2021 29.1  26.0 - 34.0 pg Final    MCHC 11/22/2021 32.2  31.0 - 36.0 g/dL Final    RDW 11/22/2021 12.0* 12.4 - 15.4 % Final    Platelets 48/06/6138 149  135 - 450 K/uL Final    MPV 11/22/2021 8.8  5.0 - 10.5 fL Final    Neutrophils % 11/22/2021 69.8  % Final    Lymphocytes % 11/22/2021 20.0  % Final    Monocytes % 11/22/2021 9.6  % Final    Eosinophils % 11/22/2021 0.4  % Final    Basophils % 11/22/2021 0.2  % Final    Neutrophils Absolute 11/22/2021 3.3  1.7 - 7.7 K/uL Final    Lymphocytes Absolute 11/22/2021 1.0  1.0 - 5.1 K/uL Final    Monocytes Absolute 11/22/2021 0.5  0.0 - 1.3 K/uL Final    Eosinophils Absolute 11/22/2021 0.0  0.0 - 0.6 K/uL Final    Basophils Absolute 11/22/2021 0.0  0.0 - 0.2 K/uL Final    Sodium 11/22/2021 134* 136 - 145 mmol/L Final    Potassium reflex Magnesium 11/22/2021 3.8  3.5 - 5.1 mmol/L Final    Chloride 11/22/2021 98* 99 - 110 mmol/L Final    CO2 11/22/2021 26  21 - 32 mmol/L Final    Anion Gap 11/22/2021 10  3 - 16 Final    Glucose 11/22/2021 306* 70 - 99 mg/dL Final    BUN 11/22/2021 14  7 - 20 mg/dL Final    CREATININE 11/22/2021 1.1  0.6 - 1.2 mg/dL Final    GFR Non- 11/22/2021 50* >60 Final    GFR  11/22/2021 >60  >60 Final    Calcium 11/22/2021 9.3  8.3 - 10.6 mg/dL Final    Total Protein 11/22/2021 7.5  6.4 - 8.2 g/dL Final    Albumin 11/22/2021 3.6  3.4 - 5.0 g/dL Final    Albumin/Globulin Ratio 11/22/2021 0.9* 1.1 - 2.2 Final    Total Bilirubin 11/22/2021 <0.2  0.0 - 1.0 mg/dL Final    Alkaline Phosphatase 11/22/2021 170* 40 - 129 U/L Final    ALT 11/22/2021 57* 10 - 40 U/L Final    AST 11/22/2021 24  15 - 37 U/L Final    Troponin 11/22/2021 <0.01  <0.01 ng/mL Final    Pro-BNP 11/22/2021 248* 0 - 124 pg/mL Final    Rapid Influenza A Ag 11/22/2021 Negative  Negative Final    Rapid Influenza B Ag 11/22/2021 Negative  Negative Final    SARS-CoV-2, NAAT 11/22/2021 DETECTED* Not Detected Final    POC Glucose 11/22/2021 223* 70 - 99 mg/dl Final    Performed on 11/22/2021 ACCU-CHEK   Final   Admission on 11/02/2021, Discharged on 11/03/2021   Component Date Value Ref Range Status    Ventricular Rate 11/02/2021 78  BPM Final    Atrial Rate 11/02/2021 78  BPM Final    P-R Interval 11/02/2021 138  ms Final    QRS Duration 11/02/2021 74  ms Final    Q-T Interval 11/02/2021 376  ms Final    QTc Calculation (Bazett) 11/02/2021 428  ms Final    P Axis 11/02/2021 78  degrees Final    R Axis 11/02/2021 -40  degrees Final    T Axis 11/02/2021 72  degrees Final    Diagnosis 11/02/2021 Normal sinus rhythmLeft axis deviationAbnormal ECGWhen compared with ECG of 27-SEP-2021 19:47,No significant change was foundConfirmed by Eneida Zapata ([id number]) on 11/2/2021 3:11:19 PM   Final    WBC 11/02/2021 5.1  4.0 - 11.0 K/uL Final    RBC 11/02/2021 3.61* 4.00 - 5.20 M/uL Final    Hemoglobin 11/02/2021 10.6* 12.0 - 16.0 g/dL Final    Hematocrit 11/02/2021 33.4* 36.0 - 48.0 % Final    MCV 11/02/2021 92.5  80.0 - 100.0 fL Final    MCH 11/02/2021 29.3  26.0 - 34.0 pg Final    MCHC 11/02/2021 31.7  31.0 - 36.0 g/dL Final    RDW 11/02/2021 12.2* 12.4 - 15.4 % Final    Platelets 39/89/9047 140  135 - 450 K/uL Final    MPV 11/02/2021 8.7  5.0 - 10.5 fL Final    Neutrophils % 11/02/2021 75.5  % Final    Lymphocytes % 11/02/2021 17.3  % Final    Monocytes % 11/02/2021 6.1  % Final    Eosinophils % 11/02/2021 0.8  % Final    Basophils % 11/02/2021 0.3  % Final    Neutrophils Absolute 11/02/2021 3.9  1.7 - 7.7 K/uL Final    Lymphocytes Absolute 11/02/2021 0.9* 1.0 - 5.1 K/uL Final    Monocytes Absolute 11/02/2021 0.3  0.0 - 1.3 K/uL Final    Eosinophils Absolute 11/02/2021 0.0  0.0 - 0.6 K/uL Final    Basophils Absolute 11/02/2021 0.0  0.0 - 0.2 K/uL Final    Sodium 11/02/2021 134* 136 - 145 mmol/L Final    Potassium 11/02/2021 4.8  3.5 - 5.1 mmol/L Final    Chloride 11/02/2021 97* 99 - 110 mmol/L Final    CO2 11/02/2021 25  21 - 32 mmol/L Final    Anion Gap 11/02/2021 12  3 - 16 Final    Glucose 11/02/2021 663* 70 - 99 mg/dL Final    BUN 11/02/2021 19  7 - 20 mg/dL Final    CREATININE 11/02/2021 1.1  0.6 - 1.2 mg/dL Final    GFR Non- 11/02/2021 50* >60 Final    GFR  11/02/2021 >60  >60 Final    Calcium 11/02/2021 9.2  8.3 - 10.6 mg/dL Final    Total Protein 11/02/2021 7.2  6.4 - 8.2 g/dL Final    Albumin 11/02/2021 3.6  3.4 - 5.0 g/dL Final    Albumin/Globulin Ratio 11/02/2021 1.0* 1.1 - 2.2 Final    Total Bilirubin 11/02/2021 0.3  0.0 - 1.0 mg/dL Final    Alkaline Phosphatase 11/02/2021 154* 40 - 129 U/L Final    ALT 11/02/2021 67* 10 - 40 U/L Final    AST 11/02/2021 43* 15 - 37 U/L Final    Troponin 11/02/2021 <0.01  <0.01 ng/mL Final    Lactic Acid 11/02/2021 2.2* 0.4 - 2.0 mmol/L Final    pH, Kyle 11/02/2021 7.286* 7.350 - 7.450 Final    pCO2, Kyle 11/02/2021 49.9  40.0 - 50.0 mmHg Final    pO2, Kyle 11/02/2021 46.8* 25.0 - 40.0 mmHg Final    HCO3, Venous 11/02/2021 23.2  23.0 - 29.0 mmol/L Final    Base Excess, Kyle 11/02/2021 -3.6* -3.0 - 3.0 mmol/L Final    O2 Sat, Kyle 11/02/2021 80  Not Established % Final    Carboxyhemoglobin 11/02/2021 2.0* 0.0 - 1.5 % Final    MetHgb, Kyle 11/02/2021 0.4  <1.5 % Final    TC02 (Calc), Kyle 11/02/2021 25  Not Established mmol/L Final    O2 Therapy 11/02/2021 Unknown   Final    Urine Culture, Routine 11/02/2021 <10,000 CFU/ml mixed skin/urogenital katherine.  No further workup   Final    Color, UA 11/02/2021 Yellow  Straw/Yellow Final    Clarity, UA 11/02/2021 Clear  Clear Final    Glucose, Ur 11/02/2021 >=1000* Negative mg/dL Final    Bilirubin Urine 11/02/2021 Negative  Negative Final    Ketones, Urine 11/02/2021 Negative  Negative mg/dL Final    Specific [place], UA 11/02/2021 1.015  1.005 - 1.030 Final    Blood, Urine 11/02/2021 Negative  Negative Final    pH, UA 11/02/2021 5.5  5.0 - 8.0 Final    Protein, UA 11/02/2021 Negative  Negative mg/dL Final    Urobilinogen, Urine 11/02/2021 0.2  <2.0 E.U./dL Final    Nitrite, Urine 11/02/2021 Negative  Negative Final    Leukocyte Esterase, Urine 11/02/2021 Negative  Negative Final    Microscopic Examination 11/02/2021 Not Indicated   Final    Urine Type 11/02/2021 NotGiven   Final    POC Glucose 11/02/2021 360* 70 - 99 mg/dl Final    Performed on 11/02/2021 ACCU-CHEK   Final    Troponin 11/02/2021 <0.01  <0.01 ng/mL Final    Troponin 11/02/2021 <0.01  <0.01 ng/mL Final    WBC 11/03/2021 5.2  4.0 - 11.0 K/uL Final    RBC 11/03/2021 3.58* 4.00 - 5.20 M/uL Final    Hemoglobin 11/03/2021 10.5* 12.0 - 16.0 g/dL Final    Hematocrit 11/03/2021 33.0* 36.0 - 48.0 % Final    MCV 11/03/2021 92.3  80.0 - 100.0 fL Final    MCH 11/03/2021 29.3  26.0 - 34.0 pg Final    MCHC 11/03/2021 31.8  31.0 - 36.0 g/dL Final    RDW 11/03/2021 12.4  12.4 - 15.4 % Final    Platelets 58/92/1280 131* 135 - 450 K/uL Final    MPV 11/03/2021 8.6  5.0 - 10.5 fL Final    Cholesterol, Total 11/03/2021 99  0 - 199 mg/dL Final    Triglycerides 11/03/2021 62  0 - 150 mg/dL Final    HDL 11/03/2021 55  40 - 60 mg/dL Final    LDL Calculated 11/03/2021 32  <100 mg/dL Final    VLDL Cholesterol Calculated 11/03/2021 12  Not Established mg/dL Final    Sodium 11/03/2021 139  136 - 145 mmol/L Final    Potassium reflex Magnesium 11/03/2021 4.6  3.5 - 5.1 mmol/L Final    Chloride 11/03/2021 105  99 - 110 mmol/L Final    CO2 11/03/2021 25  21 - 32 mmol/L Final    Anion Gap 11/03/2021 9  3 - 16 Final    Glucose 11/03/2021 167* 70 - 99 mg/dL Final    BUN 11/03/2021 15  7 - 20 mg/dL Final    CREATININE 11/03/2021 0.9  0.6 - 1.2 mg/dL Final    GFR Non- 11/03/2021 >60  >60 Final    GFR  11/03/2021 >60  >60 Final    Calcium 11/03/2021 8.6  8.3 - 10.6 mg/dL Final    Lactic Acid 11/02/2021 2.8* 0.4 - 2.0 mmol/L Final    POC Glucose 11/02/2021 250* 70 - 99 mg/dl Final    Performed on 11/02/2021 ACCU-CHEK   Final    Hemoglobin A1C 11/02/2021 11.3  See comment % Final    eAG 11/02/2021 277.6  mg/dL Final    POC Glucose 11/02/2021 325* 70 - 99 mg/dl Final    Performed on 11/02/2021 ACCU-CHEK   Final    POC Glucose 11/03/2021 48* 70 - 99 mg/dl Final    Performed on 11/03/2021 ACCU-CHEK   Final    POC Glucose 11/03/2021 114* 70 - 99 mg/dl Final    Performed on 11/03/2021 ACCU-CHEK   Final    Lactic Acid 11/03/2021 2.7* 0.4 - 2.0 mmol/L Final    POC Glucose 11/03/2021 52* 70 - 99 mg/dl Final    Performed on 11/03/2021 ACCU-CHEK   Final    POC Glucose 11/03/2021 100* 70 - 99 mg/dl Final    Performed on 11/03/2021 ACCU-CHEK   Final    POC Glucose 11/03/2021 175* 70 - 99 mg/dl Final    Performed on 11/03/2021 ACCU-CHEK   Final   Admission on 09/27/2021, Discharged on 09/29/2021   Component Date Value Ref Range Status    Ventricular Rate 09/27/2021 62  BPM Final    Atrial Rate 09/27/2021 62  BPM Final    P-R Interval 09/27/2021 150  ms Final    QRS Duration 09/27/2021 78  ms Final    Q-T Interval 09/27/2021 418  ms Final    QTc Calculation (Bazett) 09/27/2021 424  ms Final    P Axis 09/27/2021 57  degrees Final    R Axis 09/27/2021 -17  degrees Final    T Axis 09/27/2021 39  degrees Final    Diagnosis 09/27/2021 Normal sinus rhythmNormal ECGWhen compared with ECG of 08-AUG-2021 14:40,No significant change was foundConfirmed by Catrachito Oconnor MD, Kristy Ceja ([id number]) on 9/28/2021 7:28:41 PM   Final    WBC 09/27/2021 5.1  4.0 - 11.0 K/uL Final    RBC 09/27/2021 3.68* 4.00 - 5.20 M/uL Final    Hemoglobin 09/27/2021 10.6* 12.0 - 16.0 g/dL Final    Hematocrit 09/27/2021 32.2* 36.0 - 48.0 % Final    MCV 09/27/2021 87.4  80.0 - 100.0 fL Final    MCH 09/27/2021 28.8  26.0 - 34.0 pg Final    MCHC 09/27/2021 32.9  31.0 - 36.0 g/dL Final    RDW 09/27/2021 13.0  12.4 - 15.4 % Final    Platelets 90/23/3831 173  135 - 450 K/uL Final    MPV 09/27/2021 8.8  5.0 - 10.5 fL Final    Neutrophils % 09/27/2021 57.0  % Final    Lymphocytes % 09/27/2021 35.7  % Final    Monocytes % 09/27/2021 5.8  % Final    Eosinophils % 09/27/2021 0.9  % Final    Basophils % 09/27/2021 0.6  % Final    Neutrophils Absolute 09/27/2021 2.9  1.7 - 7.7 K/uL Final    Lymphocytes Absolute 09/27/2021 1.8  1.0 - 5.1 K/uL Final    Monocytes Absolute 09/27/2021 0.3  0.0 - 1.3 K/uL Final    Eosinophils Absolute 09/27/2021 0.0  0.0 - 0.6 K/uL Final    Basophils Absolute 09/27/2021 0.0  0.0 - 0.2 K/uL Final    Troponin 09/27/2021 <0.01  <0.01 ng/mL Final    Pro-BNP 09/27/2021 350* 0 - 124 pg/mL Final    SARS-CoV-2, NAAT 09/27/2021 Not Detected  Not Detected Final    pH, Kyle 09/27/2021 7.435  7.350 - 7.450 Final    pCO2, Kyle 09/27/2021 33.7* 40.0 - 50.0 mmHg Final    pO2, Kyle 09/27/2021 177.6* 25.0 - 40.0 mmHg Final    HCO3, Venous 09/27/2021 22.1* 23.0 - 29.0 mmol/L Final    Base Excess, Kyle 09/27/2021 -1.5  -3.0 - 3.0 mmol/L Final    O2 Sat, Kyle 09/27/2021 99  Not Established % Final    Carboxyhemoglobin 09/27/2021 4.9* 0.0 - 1.5 % Final    MetHgb, Kyle 09/27/2021 0.6  <1.5 % Final    TC02 (Calc), Kyle 09/27/2021 23  Not Established mmol/L Final    O2 Therapy 09/27/2021 Unknown   Final    Lactic Acid 09/28/2021 1.1  0.4 - 2.0 mmol/L Final    Sodium 09/27/2021 130* 136 - 145 mmol/L Final    Potassium 09/27/2021 4.6  3.5 - 5.1 mmol/L Final    Chloride 09/27/2021 97* 99 - 110 mmol/L Final    CO2 09/27/2021 24  21 - 32 mmol/L Final    Anion Gap 09/27/2021 9  3 - 16 Final    Glucose 09/27/2021 451* 70 - 99 mg/dL Final    BUN 09/27/2021 22* 7 - 20 mg/dL Final    CREATININE 09/27/2021 1.2  0.6 - 1.2 mg/dL Final    GFR Non- 09/27/2021 45* >60 Final    GFR  09/27/2021 55* >60 Final    Calcium 09/27/2021 9.6  8.3 - 10.6 mg/dL Final    Total Protein 09/27/2021 8.1  6.4 - 8.2 g/dL Final    Albumin 09/27/2021 4.3  3.4 - 5.0 g/dL Final    Albumin/Globulin Ratio 09/27/2021 1.1  1.1 - 2.2 Final    Total Bilirubin 09/27/2021 0.3  0.0 - 1.0 mg/dL Final    Alkaline Phosphatase 09/27/2021 166* 40 - 129 U/L Final    ALT 09/27/2021 50* 10 - 40 U/L Final    AST 09/27/2021 31  15 - 37 U/L Final    Globulin 09/27/2021 3.8  g/dL Final    Magnesium 09/27/2021 2.30  1.80 - 2.40 mg/dL Final    POC Glucose 09/27/2021 452* 70 - 99 mg/dl Final    Performed on 09/27/2021 ACCU-CHEK   Final    Color, UA 09/27/2021 Straw  Straw/Yellow Final    Clarity, UA 09/27/2021 SL CLOUDY* Clear Final    Glucose, Ur 09/27/2021 >=1000* Negative mg/dL Final    Bilirubin Urine 09/27/2021 Negative  Negative Final    Ketones, Urine 09/27/2021 Negative  Negative mg/dL Final    Specific [place], UA 09/27/2021 1.010  1.005 - 1.030 Final    Blood, Urine 09/27/2021 TRACE-INTACT* Negative Final    pH, UA 09/27/2021 5.5  5.0 - 8.0 Final    Protein, UA 09/27/2021 Negative  Negative mg/dL Final    Urobilinogen, Urine 09/27/2021 0.2  <2.0 E.U./dL Final    Nitrite, Urine 09/27/2021 Negative  Negative Final    Leukocyte Esterase, Urine 09/27/2021 Negative  Negative Final    Microscopic Examination 09/27/2021 YES   Final    Urine Type 09/27/2021 NotGiven   Final    Troponin 09/28/2021 <0.01  <0.01 ng/mL Final    WBC, UA 09/27/2021 6-9* 0 - 5 /HPF Final    RBC, UA 09/27/2021 3-4  0 - 4 /HPF Final    Epithelial Cells, UA 09/27/2021 2-5  0 - 5 /HPF Final    Bacteria, UA 09/27/2021 Rare* None Seen /HPF Final    Amorphous, UA 09/27/2021 Rare  /HPF Final    POC Glucose 09/28/2021 322* 70 - 99 mg/dl Final    Performed on 09/28/2021 ACCU-CHEK   Final    POC Glucose 09/28/2021 133* 70 - 99 mg/dl Final    Performed on 09/28/2021 ACCU-CHEK   Final    Occult Blood Screening 09/28/2021    Final Value:Result: Negative  Normal range: Negative      SARS-CoV-2, NAAT 09/28/2021 Not Detected  Not Detected Final    POC Glucose 09/28/2021 154* 70 - 99 mg/dl Final    Performed on 09/28/2021 ACCU-CHEK   Final    POC Glucose 09/28/2021 157* 70 - 99 mg/dl Final    Performed on 09/28/2021 ACCU-CHEK   Final    POC Glucose 09/28/2021 176* 70 - 99 mg/dl Final    Performed on 09/28/2021 ACCU-CHEK   Final    Sodium 09/29/2021 139  136 - 145 mmol/L Final    Potassium reflex Magnesium 09/29/2021 3.6  3.5 - 5.1 mmol/L Final    Chloride 09/29/2021 103  99 - 110 mmol/L Final    CO2 09/29/2021 26  21 - 32 mmol/L Final    Anion Gap 09/29/2021 10  3 - 16 Final    Glucose 09/29/2021 98  70 - 99 mg/dL Final    BUN 09/29/2021 13  7 - 20 mg/dL Final    CREATININE 09/29/2021 1.2  0.6 - 1.2 mg/dL Final    GFR Non- 09/29/2021 45* >60 Final    GFR  09/29/2021 55* >60 Final    Calcium 09/29/2021 9.5  8.3 - 10.6 mg/dL Final    Total Protein 09/29/2021 7.0  6.4 - 8.2 g/dL Final    Albumin 09/29/2021 3.8  3.4 - 5.0 g/dL Final    Albumin/Globulin Ratio 09/29/2021 1.2  1.1 - 2.2 Final    Total Bilirubin 09/29/2021 0.6  0.0 - 1.0 mg/dL Final    Alkaline Phosphatase 09/29/2021 138* 40 - 129 U/L Final    ALT 09/29/2021 39  10 - 40 U/L Final    AST 09/29/2021 27  15 - 37 U/L Final    Globulin 09/29/2021 3.2  g/dL Final    WBC 09/29/2021 5.4  4.0 - 11.0 K/uL Final    RBC 09/29/2021 3.83* 4.00 - 5.20 M/uL Final    Hemoglobin 09/29/2021 10.9* 12.0 - 16.0 g/dL Final    Hematocrit 09/29/2021 33.7* 36.0 - 48.0 % Final    MCV 09/29/2021 88.1  80.0 - 100.0 fL Final    MCH 09/29/2021 28.5  26.0 - 34.0 pg Final    MCHC 09/29/2021 32.3  31.0 - 36.0 g/dL Final    RDW 09/29/2021 13.3  12.4 - 15.4 % Final    Platelets 55/31/9652 161  135 - 450 K/uL Final    MPV 09/29/2021 8.7  5.0 - 10.5 fL Final    Neutrophils % 09/29/2021 37.6  % Final    Lymphocytes % 09/29/2021 50.9  % Final    Monocytes % 09/29/2021 9.6  % Final    Eosinophils % 09/29/2021 1.5  % Final    Basophils % 09/29/2021 0.4  % Final    Neutrophils Absolute 09/29/2021 2.0  1.7 - 7.7 K/uL Final    Lymphocytes Absolute 09/29/2021 2.7  1.0 - 5.1 K/uL Final    Monocytes Absolute 09/29/2021 0.5  0.0 - 1.3 K/uL Final    Eosinophils Absolute 09/29/2021 0.1  0.0 - 0.6 K/uL Final    Basophils Absolute 09/29/2021 0.0  0.0 - 0.2 K/uL Final    POC Glucose 09/28/2021 480* 70 - 99 mg/dl Final    Performed on 09/28/2021 ACCU-CHEK   Final    POC Glucose 09/28/2021 337* 70 - 99 mg/dl Final    Performed on 09/28/2021 ACCU-CHEK   Final    POC Glucose 09/29/2021 80  70 - 99 mg/dl Final    Performed on 09/29/2021 ACCU-CHEK   Final    POC Glucose 09/29/2021 132* 70 - 99 mg/dl Final    Performed on 09/29/2021 ACCU-CHEK   Final    Left Ventricular Ejection Fraction 09/29/2021 60   Final-Edited    LVEF MODALITY 09/29/2021 CATH   Final-Edited    Hemoglobin A1C 09/29/2021 14.4  See comment % Final    eAG 09/29/2021 366.6  mg/dL Final    POC Glucose 09/29/2021 414* 70 - 99 mg/dl Final    Performed on 09/29/2021 ACCU-CHEK   Final    POC ACT LR 09/29/2021 251  Not Established sec Final   No results displayed because visit has over 200 results. No results displayed because visit has over 200 results.       Admission on 05/18/2021, Discharged on 05/20/2021   Component Date Value Ref Range Status    Ventricular Rate 05/18/2021 59  BPM Final    Atrial Rate 05/18/2021 59  BPM Final    P-R Interval 05/18/2021 160  ms Final    QRS Duration 05/18/2021 82  ms Final    Q-T Interval 05/18/2021 454  ms Final    QTc Calculation (Bazett) 05/18/2021 449  ms Final    P Axis 05/18/2021 45  degrees Final    R Axis 05/18/2021 -24  degrees Final    T Axis 05/18/2021 59  degrees Final    Diagnosis 05/18/2021 Sinus bradycardiaOtherwise normal ECGWhen compared with ECG of 05-MAR-2021 20:33,No significant change was foundConfirmed by Rose Marie Bush ([id number]) on 5/20/2021 3:58:16 PM   Final    WBC 05/18/2021 4.6  4.0 - 11.0 K/uL Final    RBC 05/18/2021 3.84* 4.00 - 5.20 M/uL Final    Hemoglobin 05/18/2021 10.8* 12.0 - 16.0 g/dL Final    Hematocrit 05/18/2021 34.8* 36.0 - 48.0 % Final    MCV 05/18/2021 90.6  80.0 - 100.0 fL Final    MCH 05/18/2021 28.1  26.0 - 34.0 pg Final    MCHC 05/18/2021 31.1  31.0 - 36.0 g/dL Final    RDW 05/18/2021 13.5  12.4 - 15.4 % Final    Platelets 49/86/4951 160  135 - 450 K/uL Final    MPV 05/18/2021 10.1  5.0 - 10.5 fL Final    Neutrophils % 05/18/2021 58.4  % Final    Lymphocytes % 05/18/2021 34.8  % Final    Monocytes % 05/18/2021 5.6  % Final    Eosinophils % 05/18/2021 0.9  % Final    Basophils % 05/18/2021 0.3  % Final    Neutrophils Absolute 05/18/2021 2.7  1.7 - 7.7 K/uL Final    Lymphocytes Absolute 05/18/2021 1.6  1.0 - 5.1 K/uL Final    Monocytes Absolute 05/18/2021 0.3  0.0 - 1.3 K/uL Final    Eosinophils Absolute 05/18/2021 0.0  0.0 - 0.6 K/uL Final    Basophils Absolute 05/18/2021 0.0  0.0 - 0.2 K/uL Final    Sodium 05/18/2021 129* 136 - 145 mmol/L Final    Potassium 05/18/2021 4.8  3.5 - 5.1 mmol/L Final    Chloride 05/18/2021 93* 99 - 110 mmol/L Final    CO2 05/18/2021 24  21 - 32 mmol/L Final    Anion Gap 05/18/2021 12  3 - 16 Final    Glucose 05/18/2021 839* 70 - 99 mg/dL Final    BUN 05/18/2021 13  7 - 20 mg/dL Final    CREATININE 05/18/2021 1.6* 0.6 - 1.2 mg/dL Final    GFR Non- 05/18/2021 33* >60 Final    GFR  05/18/2021 39* >60 Final    Calcium 05/18/2021 9.3  8.3 - 10.6 mg/dL Final    Total Protein 05/18/2021 7.6  6.4 - 8.2 g/dL Final    Albumin 05/18/2021 3.7  3.4 - 5.0 g/dL Final    Albumin/Globulin Ratio 05/18/2021 0.9* 1.1 - 2.2 Final    Total Bilirubin 05/18/2021 <0.2  0.0 - 1.0 mg/dL Final    Alkaline Phosphatase 05/18/2021 198* 40 - 129 U/L Final    ALT 05/18/2021 34  10 - 40 U/L Final    AST 05/18/2021 26  15 - 37 U/L Final    Globulin 05/18/2021 3.9  g/dL Final    Troponin 05/18/2021 <0.01  <0.01 ng/mL Final    pH, Kyle 05/18/2021 7.499* 7.350 - 7.450 Final    pCO2, Kyle 05/18/2021 31.9* 40.0 - 50.0 mmHg Final    pO2, Kyle 05/18/2021 132.9* 25.0 - 40.0 mmHg Final    HCO3, Venous 05/18/2021 24.3  23.0 - 29.0 mmol/L Final    Base Excess, Kyle 05/18/2021 1.5  -3.0 - 3.0 mmol/L Final    O2 Sat, Kyle 05/18/2021 99  Not Established % Final    Carboxyhemoglobin 05/18/2021 5.2* 0.0 - 1.5 % Final    MetHgb, Kyle 05/18/2021 0.4  <1.5 % Final    TC02 (Calc), Kyle 05/18/2021 25  Not Established mmol/L Final    O2 Content, Kyle 05/18/2021 14  Not Established VOL % Final    O2 Therapy 05/18/2021 Unknown   Final    Color, UA 05/18/2021 Yellow  Straw/Yellow Final    Clarity, UA 05/18/2021 Clear  Clear Final    Glucose, Ur 05/18/2021 >=1000* Negative mg/dL Final    Bilirubin Urine 05/18/2021 Negative  Negative Final    Ketones, Urine 05/18/2021 Negative  Negative mg/dL Final    Specific Gravity, UA 05/18/2021 1.010  1.005 - 1.030 Final    Blood, Urine 05/18/2021 Negative  Negative Final    pH, UA 05/18/2021 6.0  5.0 - 8.0 Final    Protein, UA 05/18/2021 Negative  Negative mg/dL Final    Urobilinogen, Urine 05/18/2021 0.2  <2.0 E.U./dL Final    Nitrite, Urine 05/18/2021 Negative  Negative Final    Leukocyte Esterase, Urine 05/18/2021 Negative  Negative Final    Microscopic Examination 05/18/2021 Not Indicated   Final    Urine Type 05/18/2021 NotGiven   Final    Beta-Hydroxybutyrate 05/18/2021 0.19  0.00 - 0.27 mmol/L Final    POC Glucose 05/18/2021 >600* 70 - 99 mg/dl Final    Performed on 05/18/2021 ACCU-CHEK   Final    Glucose 05/18/2021   mg/dL Final    QC OK? 05/18/2021 yes   Final    POC Glucose 05/18/2021 >600* 70 - 99 mg/dl Final    Performed on 05/18/2021 ACCU-CHEK   Final    Sodium 05/19/2021 134* 136 - 145 mmol/L Final    Potassium reflex Magnesium 05/19/2021 4.2  3.5 - 5.1 mmol/L Final    Chloride 05/19/2021 100  99 - 110 mmol/L Final    CO2 05/19/2021 22  21 - 32 mmol/L Final    Anion Gap 05/19/2021 12  3 - 16 Final    Glucose 05/19/2021 582* 70 - 99 mg/dL Final    BUN 05/19/2021 12  7 - 20 mg/dL Final    CREATININE 05/19/2021 1.2  0.6 - 1.2 mg/dL Final    GFR Non- 05/19/2021 45* >60 Final    GFR  05/19/2021 55* >60 Final    Calcium 05/19/2021 8.8  8.3 - 10.6 mg/dL Final    Sodium 05/19/2021 141  136 - 145 mmol/L Final    Potassium reflex Magnesium 05/19/2021 3.7  3.5 - 5.1 mmol/L Final    Chloride 05/19/2021 105  99 - 110 mmol/L Final    CO2 05/19/2021 30  21 - 32 mmol/L Final    Anion Gap 05/19/2021 6  3 - 16 Final    Glucose 05/19/2021 255* 70 - 99 mg/dL Final    BUN 05/19/2021 10  7 - 20 mg/dL Final    CREATININE 05/19/2021 1.0  0.6 - 1.2 mg/dL Final    GFR Non- 05/19/2021 56* >60 Final    GFR  05/19/2021 >60  >60 Final    Calcium 05/19/2021 9.4  8.3 - 10.6 mg/dL Final    Troponin 05/19/2021 <0.01  <0.01 ng/mL Final    Troponin 05/19/2021 <0.01  <0.01 ng/mL Final    POC Glucose 05/19/2021 338* 70 - 99 mg/dl Final    Performed on 05/19/2021 ACCU-CHEK   Final    POC Glucose 05/19/2021 198* 70 - 99 mg/dl Final    Performed on 05/19/2021 ACCU-CHEK   Final    POC Glucose 05/19/2021 149* 70 - 99 mg/dl Final    Performed on 05/19/2021 ACCU-CHEK   Final    Left Ventricular Ejection Fraction 05/20/2021 53   Final-Edited    LVEF MODALITY 05/20/2021 ECHO   Final-Edited    Left Ventricular Ejection Fraction 05/20/2021 36   Final-Edited    LVEF MODALITY 05/20/2021 Nuclear   Final-Edited    POC Glucose 05/19/2021 51* 70 - 99 mg/dl Final    Performed on 05/19/2021 ACCU-CHEK   Final    POC Glucose 05/19/2021 59* 70 - 99 mg/dl Final    Performed on 05/19/2021 ACCU-CHEK   Final    POC Glucose 05/19/2021 67* 70 - 99 mg/dl Final    Performed on 05/19/2021 ACCU-CHEK   Final    POC Glucose 05/19/2021 154* 70 - 99 mg/dl Final    Performed on 05/19/2021 ACCU-CHEK   Final    POC Glucose 05/20/2021 91  70 - 99 mg/dl Final    Performed on 05/20/2021 ACCU-CHEK   Final    POC Glucose 05/20/2021 75  70 - 99 mg/dl Final    Performed on 05/20/2021 ACCU-CHEK   Final    POC Glucose 05/20/2021 183* 70 - 99 mg/dl Final    Performed on 05/20/2021 ACCU-CHEK   Final    POC Glucose 05/20/2021 248* 70 - 99 mg/dl Final    Performed on 05/20/2021 ACCU-CHEK   Final   No results displayed because visit has over 200 results.       Admission on 02/08/2021, Discharged on 02/08/2021   Component Date Value Ref Range Status    Ventricular Rate 02/08/2021 68  BPM Final    Atrial Rate 02/08/2021 68  BPM Final    P-R Interval 02/08/2021 148  ms Final    QRS Duration 02/08/2021 72  ms Final    Q-T Interval 02/08/2021 408  ms Final    QTc Calculation (Bazett) 02/08/2021 433  ms Final    P Axis 02/08/2021 53  degrees Final    R Axis 02/08/2021 0  degrees Final    T Axis 02/08/2021 38  degrees Final    Diagnosis 02/08/2021 Normal sinus rhythmNormal ECGWhen compared with ECG of 26-JAN-2021 16:29,No significant change was foundConfirmed by Wendy Villeda ([id number]) on 2/9/2021 2:43:04 PM   Final    WBC 02/08/2021 5.9  4.0 - 11.0 K/uL Final    RBC 02/08/2021 2.88* 4.00 - 5.20 M/uL Final    Hemoglobin 02/08/2021 8.7* 12.0 - 16.0 g/dL Final    Hematocrit 02/08/2021 27.2* 36.0 - 48.0 % Final    MCV 02/08/2021 94.3  80.0 - 100.0 fL Final    MCH 02/08/2021 30.2  26.0 - 34.0 pg Final    MCHC 02/08/2021 32.0  31.0 - 36.0 g/dL Final    RDW 02/08/2021 16.5* 12.4 - 15.4 % Final    Platelets 12/09/0638 245  135 - 450 K/uL Final    MPV 02/08/2021 9.1  5.0 - 10.5 fL Final    Neutrophils % 02/08/2021 75.6  % Final    Lymphocytes % 02/08/2021 16.9  % Final    Monocytes % 02/08/2021 6.6  % Final    Eosinophils % 02/08/2021 0.6  % Final    Basophils % 02/08/2021 0.3  % Final    Neutrophils Absolute 02/08/2021 4.4  1.7 - 7.7 K/uL Final    Lymphocytes Absolute 02/08/2021 1.0  1.0 - 5.1 K/uL Final    Monocytes Absolute 02/08/2021 0.4  0.0 - 1.3 K/uL Final    Eosinophils Absolute 02/08/2021 0.0  0.0 - 0.6 K/uL Final    Basophils Absolute 02/08/2021 0.0  0.0 - 0.2 K/uL Final    Sodium 02/08/2021 139  136 - 145 mmol/L Final    Potassium 02/08/2021 4.3  3.5 - 5.1 mmol/L Final    Chloride 02/08/2021 101  99 - 110 mmol/L Final    CO2 02/08/2021 28  21 - 32 mmol/L Final    Anion Gap 02/08/2021 10  3 - 16 Final    Glucose 02/08/2021 311* 70 - 99 mg/dL Final    BUN 02/08/2021 11  7 - 20 mg/dL Final    CREATININE 02/08/2021 1.1  0.6 - 1.2 mg/dL Final    GFR Non- 02/08/2021 50* >60 Final    GFR  02/08/2021 >60  >60 Final    Calcium 02/08/2021 8.6  8.3 - 10.6 mg/dL Final    Total Protein 02/08/2021 6.8  6.4 - 8.2 g/dL Final    Albumin 02/08/2021 3.4  3.4 - 5.0 g/dL Final    Albumin/Globulin Ratio 02/08/2021 1.0* 1.1 - 2.2 Final    Total Bilirubin 02/08/2021 0.3  0.0 - 1.0 mg/dL Final    Alkaline Phosphatase 02/08/2021 288* 40 - 129 U/L Final    ALT 02/08/2021 55* 10 - 40 U/L Final    AST 02/08/2021 56* 15 - 37 U/L Final    Globulin 02/08/2021 3.4  g/dL Final    Troponin 02/08/2021 <0.01  <0.01 ng/mL Final    Specimen Status 02/08/2021 KIMMY   Final    Lipase 02/08/2021 5.0* 13.0 - 60.0 U/L Final    Troponin 02/08/2021 <0.01  <0.01 ng/mL Final    Ventricular Rate 02/08/2021 56  BPM Final    Atrial Rate 02/08/2021 56  BPM Final    P-R Interval 02/08/2021 164  ms Final    QRS Duration 02/08/2021 68  ms Final    Q-T Interval 02/08/2021 458  ms Final    QTc Calculation (Bazett) 02/08/2021 441  ms Final    P Axis 02/08/2021 62  degrees Final    R Axis 02/08/2021 -13  degrees Final    T Axis 02/08/2021 35  degrees Final    Diagnosis 02/08/2021 Sinus bradycardiaOtherwise normal ECGWhen compared with ECG of 08-FEB-2021 15:11,No significant change was foundConfirmed by Chatianya Murguia ([id number]) on 2/9/2021 2:43:10 PM   Final   No results displayed because visit has over 200 results. Assessment and Plan     Sandy Toth was seen today for diabetes. Diagnoses and all orders for this visit:    Uncontrolled type 2 diabetes mellitus with microalbuminuria, with long-term current use of insulin ([facility])  -     glimepiride (AMARYL) 4 MG tablet; 4 mg with breakfast or first meal of the day  -     Comprehensive Metabolic Panel; Future  -     Hemoglobin A1C; Future  -     Microalbumin / Creatinine Urine Ratio; Future    Type 2 diabetes mellitus with other diabetic kidney complication (HCC)  -     glimepiride (AMARYL) 4 MG tablet; 4 mg with breakfast or first meal of the day  -     Comprehensive Metabolic Panel; Future  -     Hemoglobin A1C; Future  -     Microalbumin / Creatinine Urine Ratio; Future    Type 2 diabetes mellitus with diabetic nephropathy, with long-term current use of insulin ([facility])  -     glimepiride (AMARYL) 4 MG tablet; 4 mg with breakfast or first meal of the day  -     Comprehensive Metabolic Panel; Future  -     Hemoglobin A1C; Future  -     Microalbumin / Creatinine Urine Ratio; Future    Uncontrolled type 2 diabetes mellitus with diabetic nephropathy, with long-term current use of insulin ([facility] [address]  -     Comprehensive Metabolic Panel; Future  -     Hemoglobin A1C; Future  -     Microalbumin / Creatinine Urine Ratio; Future          1: Type 2 DM complicated with nephropathy, neuropathy TIAs, carotid stenosis   Uncontrolled A1C 14.4% Sep 29th 2021 < 15.5%   < 15.3% <  9.3% (after transfusion) < 11.9% < 7.3% < 8.4%<  6.8% < 10.1%  << 8.1 <  8% < 11.7%    Cr 1.2, GFR 55 Oct 2020   A1C of <8 would be acceptable because of microvascular and macrovascular complications.      Questionable adherence to medical plan, treatment adherence and diet plan   Jian not approved      Need to bring meds on every visit    Bring meter every visit   Adherence to medications is questionable     Blood sugars are variable depending on the intake     Change Glimepiride 2 > 4 mg with breakfast or first meal of the day     C/w Lantus 26 units daily in am.  Victoza 1.8 mg daily in AM    Novolog SS                     With meals (during the day)   - at bedtime    < 150 ---     3 units                                 0 units  151-200 --  4 units                                 0 units  201-250 :    6 units                                 1 units  251-300:     7 units                                 2 units  301-350:     8 units                                 3 units  >350 :         9 units                                  4 units    Reiterated importance of taking care meds to control diabetes     All instructions provided in written. Check Blood sugars 4 times per day. Log them along with insulin and send them every 2 weeks. Call for blood sugars less than 60 or more than 400. Eye exam: Last exam in March 12th 2018, Needs an exam now. Foot exam:  March 2020    Deformity/amputation: absent  Skin lesions/pre-ulcerative calluses: absent  Edema: right- negative, left- negative  Sensory exam: Monofilament sensation: normal  Plus at least one of the following:   Pulses: normal,   Vibration (128 Hz): Moderately decreased     Renal screen: High 68 Feb 2018, high 47 Jan 2019  > 40 June 2020     TSH screen: Feb 2018   Saw CDE in 2016 with Rd.     2: HTN   Controlled     3: Hyperlipidemia   LDL: 33, HDL: 39, TGs: 118 June 2020      Atorvastatin 10 mg daily     4: Unintentional weigh tloss - good appetite   TSH, FT4, am cortisol normal - Nov 2020    Following up with pcp     RTC in 3 months with logs   Blood work now: A1C, CMP, MACr   OR via telemedicine depending on COVID-19 restrictions at the time of their next appointment. A total of 9 minutes was spent conversing with the patient and over half of that time was spent counseling the patient on endocrine related medical issues.        EDUCATION:   Greater than 50% of this follow-up visit was spent in general counseling regarding obesity, diet, exercise, importance of adherence to insulin regime, recognition and treatment of hypo and hyperglycemia,  glucose logging, proper diabetes management, diabetic complications with poor management and the importance of glycemic control in order to avoid the complications of diabetes. Risks and potential complications of diabetes were reviewed with the patient. Diabetes health maintenance plan and follow-up were discussed and understood by the patient. We reviewed the importance of medication compliance and regular follow-up. Aggressive lifestyle modification was encouraged. Exercise Counselling: This patient is a candidate for regular physical exercise. Instructions to perform the following types of exercise:  Swimming or water aerobic exercise  Brisk walking  Playing tennis  Stationary bicycle or elliptical indoor  Low impact aerobic exercise    Instructions given to exercise for the following duration:  30 minutes a day for five-seven days per week.     Following instructions for being active throughout the day in addition to formal exercise:  Walk instead of drive whenever possible  Take the stairs instead of the elevator  Work in the garden  Park to the far end of the parking lot to add more walking steps to destination      Electronically signed by Adeola Cisneros MD on 1/12/2022 at 12:30 PM

## 2022-01-20 ENCOUNTER — HOSPITAL ENCOUNTER (OUTPATIENT)
Dept: MAMMOGRAPHY | Age: 62
Discharge: HOME | End: 2022-01-25
Payer: MEDICAID

## 2022-01-20 VITALS — WEIGHT: 118 LBS | HEIGHT: 63 IN | BODY MASS INDEX: 20.91 KG/M2

## 2022-01-20 DIAGNOSIS — Z12.31 VISIT FOR SCREENING MAMMOGRAM: ICD-10-CM

## 2022-01-20 PROCEDURE — 77067 SCR MAMMO BI INCL CAD: CPT

## 2022-02-01 NOTE — PROGRESS NOTES
02/01/22 1541   Post-Acute Status   Post-Acute Authorization Placement   Post-Acute Placement Status Pending payor review/awaiting authorization (if required)   Discharge Delays (!) Post-Acute Set-up   Discharge Plan   Discharge Plan A Rehab   Discharge Plan B Skilled Nursing Facility     JASIEL spoke with [facility]-rehab liaison Dariel who reported that authorization is pending from [facility].  JASIEL notified pt at bedside.    JASIEL called pt's son Ed to report this information and son endorsed plan for pt to go to [facility] if pt is denied for rehab.    JASIEL contacted  leadership and asked for this authorization to be escalated.    JASIEL will continue to coordinate with patient, family, team and insurance to complete patient's discharge plan.    Irasema Tesfaye LMSW  [id number]     Pt transitioned to DKA multiplier protocol. Fluids switched to D5 1/2 NS, multiplier started at 0.03. Current . Will continue to monitor.

## 2022-02-14 ENCOUNTER — APPOINTMENT (OUTPATIENT)
Dept: GENERAL RADIOLOGY | Age: 62
DRG: 125 | End: 2022-02-14
Payer: COMMERCIAL

## 2022-02-14 ENCOUNTER — APPOINTMENT (OUTPATIENT)
Dept: CT IMAGING | Age: 62
DRG: 125 | End: 2022-02-14
Payer: COMMERCIAL

## 2022-02-14 ENCOUNTER — HOSPITAL ENCOUNTER (INPATIENT)
Age: 62
LOS: 2 days | Discharge: HOME | DRG: 125 | End: 2022-02-16
Attending: EMERGENCY MEDICINE | Admitting: INTERNAL MEDICINE
Payer: COMMERCIAL

## 2022-02-14 DIAGNOSIS — R42 DIZZINESS: Primary | ICD-10-CM

## 2022-02-14 DIAGNOSIS — H57.11 ACUTE RIGHT EYE PAIN: ICD-10-CM

## 2022-02-14 DIAGNOSIS — R29.898 RIGHT LEG WEAKNESS: ICD-10-CM

## 2022-02-14 PROBLEM — R00.1 SYMPTOMATIC BRADYCARDIA: Status: ACTIVE | Noted: 2022-02-14

## 2022-02-14 PROBLEM — N18.31 STAGE 3A CHRONIC KIDNEY DISEASE (HCC): Status: ACTIVE | Noted: 2017-09-17

## 2022-02-14 LAB
A/G RATIO: 1.3 (ref 1.1–2.2)
ALBUMIN SERPL-MCNC: 4 G/DL (ref 3.4–5)
ALP BLD-CCNC: 215 U/L (ref 40–129)
ALT SERPL-CCNC: 66 U/L (ref 10–40)
AMPHETAMINE SCREEN, URINE: ABNORMAL
ANION GAP SERPL CALCULATED.3IONS-SCNC: 12 MMOL/L (ref 3–16)
AST SERPL-CCNC: 31 U/L (ref 15–37)
BACTERIA: ABNORMAL /HPF
BARBITURATE SCREEN URINE: ABNORMAL
BASE EXCESS VENOUS: 1.3 MMOL/L (ref -3–3)
BASOPHILS ABSOLUTE: 0 K/UL (ref 0–0.2)
BASOPHILS RELATIVE PERCENT: 0.5 %
BENZODIAZEPINE SCREEN, URINE: ABNORMAL
BILIRUB SERPL-MCNC: <0.2 MG/DL (ref 0–1)
BILIRUBIN URINE: NEGATIVE
BLOOD, URINE: ABNORMAL
BUN BLDV-MCNC: 19 MG/DL (ref 7–20)
CALCIUM SERPL-MCNC: 9.5 MG/DL (ref 8.3–10.6)
CANNABINOID SCREEN URINE: ABNORMAL
CARBOXYHEMOGLOBIN: 4.5 % (ref 0–1.5)
CHLORIDE BLD-SCNC: 97 MMOL/L (ref 99–110)
CLARITY: CLEAR
CO2: 27 MMOL/L (ref 21–32)
COCAINE METABOLITE SCREEN URINE: ABNORMAL
COLOR: YELLOW
CREAT SERPL-MCNC: 1.1 MG/DL (ref 0.6–1.2)
EKG ATRIAL RATE: 58 BPM
EKG DIAGNOSIS: NORMAL
EKG P AXIS: 80 DEGREES
EKG P-R INTERVAL: 158 MS
EKG Q-T INTERVAL: 448 MS
EKG QRS DURATION: 80 MS
EKG QTC CALCULATION (BAZETT): 439 MS
EKG R AXIS: -23 DEGREES
EKG T AXIS: 58 DEGREES
EKG VENTRICULAR RATE: 58 BPM
EOSINOPHILS ABSOLUTE: 0 K/UL (ref 0–0.6)
EOSINOPHILS RELATIVE PERCENT: 0.7 %
EPITHELIAL CELLS, UA: ABNORMAL /HPF (ref 0–5)
GFR AFRICAN AMERICAN: >60
GFR NON-AFRICAN AMERICAN: 50
GLUCOSE BLD-MCNC: 268 MG/DL (ref 70–99)
GLUCOSE BLD-MCNC: 417 MG/DL (ref 70–99)
GLUCOSE BLD-MCNC: 428 MG/DL (ref 70–99)
GLUCOSE URINE: >=1000 MG/DL
HCO3 VENOUS: 25.5 MMOL/L (ref 23–29)
HCT VFR BLD CALC: 33.9 % (ref 36–48)
HEMOGLOBIN: 11.1 G/DL (ref 12–16)
KETONES, URINE: NEGATIVE MG/DL
LEUKOCYTE ESTERASE, URINE: NEGATIVE
LYMPHOCYTES ABSOLUTE: 1.5 K/UL (ref 1–5.1)
LYMPHOCYTES RELATIVE PERCENT: 27.8 %
Lab: ABNORMAL
MCH RBC QN AUTO: 28.4 PG (ref 26–34)
MCHC RBC AUTO-ENTMCNC: 32.7 G/DL (ref 31–36)
MCV RBC AUTO: 86.9 FL (ref 80–100)
METHADONE SCREEN, URINE: ABNORMAL
METHEMOGLOBIN VENOUS: 0.3 %
MICROSCOPIC EXAMINATION: YES
MONOCYTES ABSOLUTE: 0.3 K/UL (ref 0–1.3)
MONOCYTES RELATIVE PERCENT: 5.4 %
NEUTROPHILS ABSOLUTE: 3.6 K/UL (ref 1.7–7.7)
NEUTROPHILS RELATIVE PERCENT: 65.6 %
NITRITE, URINE: NEGATIVE
O2 SAT, VEN: 90 %
O2 THERAPY: ABNORMAL
OPIATE SCREEN URINE: ABNORMAL
OXYCODONE URINE: POSITIVE
PCO2, VEN: 38.9 MMHG (ref 40–50)
PDW BLD-RTO: 12.4 % (ref 12.4–15.4)
PERFORMED ON: ABNORMAL
PERFORMED ON: ABNORMAL
PH UA: 6.5
PH UA: 6.5 (ref 5–8)
PH VENOUS: 7.43 (ref 7.35–7.45)
PHENCYCLIDINE SCREEN URINE: ABNORMAL
PLATELET # BLD: 136 K/UL (ref 135–450)
PMV BLD AUTO: 9.7 FL (ref 5–10.5)
PO2, VEN: 55.2 MMHG (ref 25–40)
POTASSIUM SERPL-SCNC: 4.4 MMOL/L (ref 3.5–5.1)
PROPOXYPHENE SCREEN: ABNORMAL
PROTEIN UA: 30 MG/DL
RBC # BLD: 3.9 M/UL (ref 4–5.2)
RBC UA: ABNORMAL /HPF (ref 0–4)
SEDIMENTATION RATE, ERYTHROCYTE: 49 MM/HR (ref 0–30)
SODIUM BLD-SCNC: 136 MMOL/L (ref 136–145)
SPECIFIC GRAVITY UA: 1.01 (ref 1–1.03)
SPECIMEN STATUS: NORMAL
TCO2 CALC VENOUS: 27 MMOL/L
TOTAL PROTEIN: 7 G/DL (ref 6.4–8.2)
TROPONIN: <0.01 NG/ML
URINE TYPE: ABNORMAL
UROBILINOGEN, URINE: 0.2 E.U./DL
WBC # BLD: 5.4 K/UL (ref 4–11)
WBC UA: ABNORMAL /HPF (ref 0–5)

## 2022-02-14 PROCEDURE — 6370000000 HC RX 637 (ALT 250 FOR IP): Performed by: INTERNAL MEDICINE

## 2022-02-14 PROCEDURE — 6370000000 HC RX 637 (ALT 250 FOR IP): Performed by: EMERGENCY MEDICINE

## 2022-02-14 PROCEDURE — 81001 URINALYSIS AUTO W/SCOPE: CPT

## 2022-02-14 PROCEDURE — 71045 X-RAY EXAM CHEST 1 VIEW: CPT

## 2022-02-14 PROCEDURE — 86140 C-REACTIVE PROTEIN: CPT

## 2022-02-14 PROCEDURE — 93005 ELECTROCARDIOGRAM TRACING: CPT | Performed by: NURSE PRACTITIONER

## 2022-02-14 PROCEDURE — 82803 BLOOD GASES ANY COMBINATION: CPT

## 2022-02-14 PROCEDURE — G0378 HOSPITAL OBSERVATION PER HR: HCPCS

## 2022-02-14 PROCEDURE — 70498 CT ANGIOGRAPHY NECK: CPT

## 2022-02-14 PROCEDURE — 85025 COMPLETE CBC W/AUTO DIFF WBC: CPT

## 2022-02-14 PROCEDURE — 70450 CT HEAD/BRAIN W/O DYE: CPT

## 2022-02-14 PROCEDURE — 80307 DRUG TEST PRSMV CHEM ANLYZR: CPT

## 2022-02-14 PROCEDURE — 99285 EMERGENCY DEPT VISIT HI MDM: CPT

## 2022-02-14 PROCEDURE — 96361 HYDRATE IV INFUSION ADD-ON: CPT

## 2022-02-14 PROCEDURE — 6360000004 HC RX CONTRAST MEDICATION: Performed by: NURSE PRACTITIONER

## 2022-02-14 PROCEDURE — 2580000003 HC RX 258: Performed by: NURSE PRACTITIONER

## 2022-02-14 PROCEDURE — 2060000000 HC ICU INTERMEDIATE R&B

## 2022-02-14 PROCEDURE — 93010 ELECTROCARDIOGRAM REPORT: CPT | Performed by: INTERNAL MEDICINE

## 2022-02-14 PROCEDURE — 84484 ASSAY OF TROPONIN QUANT: CPT

## 2022-02-14 PROCEDURE — 85652 RBC SED RATE AUTOMATED: CPT

## 2022-02-14 PROCEDURE — 80053 COMPREHEN METABOLIC PANEL: CPT

## 2022-02-14 RX ORDER — INSULIN GLARGINE 100 [IU]/ML
35 INJECTION, SOLUTION SUBCUTANEOUS NIGHTLY
Status: DISCONTINUED | OUTPATIENT
Start: 2022-02-14 | End: 2022-02-15

## 2022-02-14 RX ORDER — 0.9 % SODIUM CHLORIDE 0.9 %
1000 INTRAVENOUS SOLUTION INTRAVENOUS ONCE
Status: DISCONTINUED | OUTPATIENT
Start: 2022-02-14 | End: 2022-02-14

## 2022-02-14 RX ORDER — 0.9 % SODIUM CHLORIDE 0.9 %
1000 INTRAVENOUS SOLUTION INTRAVENOUS ONCE
Status: COMPLETED | OUTPATIENT
Start: 2022-02-14 | End: 2022-02-14

## 2022-02-14 RX ORDER — LISINOPRIL 20 MG/1
40 TABLET ORAL ONCE
Status: COMPLETED | OUTPATIENT
Start: 2022-02-14 | End: 2022-02-14

## 2022-02-14 RX ORDER — NICOTINE POLACRILEX 4 MG
15 LOZENGE BUCCAL PRN
Status: DISCONTINUED | OUTPATIENT
Start: 2022-02-14 | End: 2022-02-16 | Stop reason: HOSPADM

## 2022-02-14 RX ORDER — ASPIRIN 81 MG/1
324 TABLET, CHEWABLE ORAL ONCE
Status: COMPLETED | OUTPATIENT
Start: 2022-02-14 | End: 2022-02-14

## 2022-02-14 RX ORDER — DEXTROSE MONOHYDRATE 50 MG/ML
100 INJECTION, SOLUTION INTRAVENOUS PRN
Status: DISCONTINUED | OUTPATIENT
Start: 2022-02-14 | End: 2022-02-16 | Stop reason: HOSPADM

## 2022-02-14 RX ADMIN — IOPAMIDOL 75 ML: 755 INJECTION, SOLUTION INTRAVENOUS at 16:39

## 2022-02-14 RX ADMIN — LISINOPRIL 40 MG: 20 TABLET ORAL at 18:11

## 2022-02-14 RX ADMIN — ASPIRIN 81 MG 324 MG: 81 TABLET ORAL at 18:11

## 2022-02-14 RX ADMIN — INSULIN LISPRO 3 UNITS: 100 INJECTION, SOLUTION INTRAVENOUS; SUBCUTANEOUS at 21:41

## 2022-02-14 RX ADMIN — SODIUM CHLORIDE 1000 ML: 9 INJECTION, SOLUTION INTRAVENOUS at 16:14

## 2022-02-14 RX ADMIN — INSULIN GLARGINE 35 UNITS: 100 INJECTION, SOLUTION SUBCUTANEOUS at 22:54

## 2022-02-14 ASSESSMENT — PAIN SCALES - GENERAL
PAINLEVEL_OUTOF10: 8
PAINLEVEL_OUTOF10: 8
PAINLEVEL_OUTOF10: 4
PAINLEVEL_OUTOF10: 8

## 2022-02-14 ASSESSMENT — VISUAL ACUITY
OS: 20/40
OU: 20/25
OD: 20/40

## 2022-02-14 ASSESSMENT — PAIN - FUNCTIONAL ASSESSMENT
PAIN_FUNCTIONAL_ASSESSMENT: PREVENTS OR INTERFERES SOME ACTIVE ACTIVITIES AND ADLS
PAIN_FUNCTIONAL_ASSESSMENT: PREVENTS OR INTERFERES SOME ACTIVE ACTIVITIES AND ADLS

## 2022-02-14 ASSESSMENT — PAIN DESCRIPTION - PAIN TYPE
TYPE: ACUTE PAIN
TYPE: ACUTE PAIN

## 2022-02-14 ASSESSMENT — PAIN DESCRIPTION - LOCATION
LOCATION: HEAD;BACK
LOCATION: EYE
LOCATION: EYE
LOCATION: HEAD;BACK

## 2022-02-14 ASSESSMENT — PAIN DESCRIPTION - ORIENTATION
ORIENTATION: RIGHT
ORIENTATION: RIGHT

## 2022-02-14 ASSESSMENT — PAIN DESCRIPTION - DIRECTION
RADIATING_TOWARDS: NO
RADIATING_TOWARDS: NO

## 2022-02-14 ASSESSMENT — PAIN DESCRIPTION - DESCRIPTORS
DESCRIPTORS: ACHING
DESCRIPTORS: ACHING

## 2022-02-14 ASSESSMENT — PAIN DESCRIPTION - ONSET
ONSET: ON-GOING
ONSET: ON-GOING

## 2022-02-14 ASSESSMENT — PAIN DESCRIPTION - FREQUENCY
FREQUENCY: CONTINUOUS
FREQUENCY: CONTINUOUS

## 2022-02-14 ASSESSMENT — PAIN DESCRIPTION - PROGRESSION
CLINICAL_PROGRESSION: NOT CHANGED

## 2022-02-14 NOTE — ED PROVIDER NOTES
I independently performed a history and physical on Basim Ortega. All diagnostic, treatment, and disposition decisions were made by myself in conjunction with the advanced practice provider. I personally saw the patient and performed a substantive portion of the visit including all aspects of the medical decision making. For further details of [facility] emergency department encounter, please see Lydia Chan NP's documentation. Patient is a 24-year-old female presenting today due to concern for waking up this morning at 9 AM with persistent dizziness and a right-sided headache with bilateral blurry vision but pain to the right eye. Her headache is currently 8 out of 10 although not the worst headache of her life. She went to bed at 10 PM last night feeling normal.  She has been worked up for temporal arteritis in the past.  She also is a diabetic. She reports lightheadedness and near syncope although denies any actual falls or trauma. She denies any chest pain but has chronic shortness of breath. She is supposed to wear glasses but states she did not bring them today. She denies any unilateral numbness or weakness in the arms or legs although does feel weak all over. She does report having a history of TIA/CVA in the past.  Due to concern for persistent dizziness since awakening this morning but also right-sided headache, she came to the emergency department for further evaluation. Physical:   Gen: No acute distress. AOx3.   Psych: Anxious mood and affect  HEENT: NCAT, EOMI, PERRL, MMM, pain near right temporal artery, normal visual fields   Neck: supple, normal ROM, NTTP  Cardiac: RRR, pulses 2+ in upper extremities  Lungs: C2AB, no R/R/W  Abdomen: soft and nontender with no R/D/G  Neuro: no focal neuro deficits with strength and sensation 5/5 in all 4 extremities, no facial droop, NIHSS = 0, ambulatory without assistance but states she feels off-balanced during this, negative test of skew     The Ekg interpreted by me shows  sinus bradycardia, rate=58   Axis is   Normal  QTc is  normal  Intervals and Durations are unremarkable. ST Segments: no acute change and nonspecific changes  No significant change from prior EKG dated - 11/2/21  No STEMI       MDM: Patient was evaluated due to concern for dizziness since awakening along with bilateral blurry vision in the eyes. Her initial glucose was significantly elevated and I do believe this could be the cause of her symptoms. She did receive IV fluids related to this. I did speak to stroke team and they also agreed with no concern for code stroke, especially with concern for hyperglycemia. I spoke with ophthalmology related to the pain near the right temporal artery and possibility of temporal arteritis and at this time they recommend against steroids due to concern for significant hyperglycemia. I ultimately did find the pathology report that was read as no concern for temporal arteritis when it was obtained in 2021. I spoke to rheumatology who also recommended against any steroids but if symptoms persist, she can always follow-up with ophthalmology if needed for repeat biopsy. Due to concern for persistent dizziness and high risk for possible stroke, she will need further evaluation in the hospital with MRI. Otherwise, if right temporal eye pain persist, she may ultimately need to follow-up with ophthalmology urgently for a biopsy but at this time risk of steroids outweighs the benefit. She was stable for the floor with telemetry and felt comfortable with this plan. Her blood pressure did briefly elevate and her pulse went into the upper 30s but I did give her a home dose of lisinopril and following this her blood pressure improved and her pulse improved accordingly. She will need to be monitored related to this in the hospital.  No reported mental status changes while this was occurring per nursing staff when I was updated. Hospitalist was paged for admission.        Luca England MD  02/15/22 1180

## 2022-02-14 NOTE — ED NOTES
Bed: 18  Expected date:   Expected time:   Means of arrival:   Comments:  Krista Ventura RN  02/14/22 3058

## 2022-02-14 NOTE — CONSULTS
2673- Placed call to  Stroke Team  RE: Dizziness per Gabriela Guzman MD  [id number]- Dr. Ruy Liu called back @ [phone]

## 2022-02-15 ENCOUNTER — APPOINTMENT (OUTPATIENT)
Dept: MRI IMAGING | Age: 62
DRG: 125 | End: 2022-02-15
Payer: COMMERCIAL

## 2022-02-15 LAB
C-REACTIVE PROTEIN: <3 MG/L (ref 0–5.1)
ESTIMATED AVERAGE GLUCOSE: 283.4 MG/DL
GLUCOSE BLD-MCNC: 122 MG/DL (ref 70–99)
GLUCOSE BLD-MCNC: 230 MG/DL (ref 70–99)
GLUCOSE BLD-MCNC: 281 MG/DL (ref 70–99)
GLUCOSE BLD-MCNC: 301 MG/DL (ref 70–99)
GLUCOSE BLD-MCNC: 340 MG/DL (ref 70–99)
GLUCOSE BLD-MCNC: 41 MG/DL (ref 70–99)
GLUCOSE BLD-MCNC: 429 MG/DL (ref 70–99)
GLUCOSE BLD-MCNC: 45 MG/DL (ref 70–99)
GLUCOSE BLD-MCNC: 60 MG/DL (ref 70–99)
GLUCOSE BLD-MCNC: 95 MG/DL (ref 70–99)
HBA1C MFR BLD: 11.5 %
PERFORMED ON: ABNORMAL
PERFORMED ON: NORMAL

## 2022-02-15 PROCEDURE — 70553 MRI BRAIN STEM W/O & W/DYE: CPT

## 2022-02-15 PROCEDURE — 2060000000 HC ICU INTERMEDIATE R&B

## 2022-02-15 PROCEDURE — 6360000002 HC RX W HCPCS: Performed by: NURSE PRACTITIONER

## 2022-02-15 PROCEDURE — A9579 GAD-BASE MR CONTRAST NOS,1ML: HCPCS | Performed by: INTERNAL MEDICINE

## 2022-02-15 PROCEDURE — 96374 THER/PROPH/DIAG INJ IV PUSH: CPT

## 2022-02-15 PROCEDURE — 2500000003 HC RX 250 WO HCPCS

## 2022-02-15 PROCEDURE — 6370000000 HC RX 637 (ALT 250 FOR IP): Performed by: INTERNAL MEDICINE

## 2022-02-15 PROCEDURE — 36415 COLL VENOUS BLD VENIPUNCTURE: CPT

## 2022-02-15 PROCEDURE — 6370000000 HC RX 637 (ALT 250 FOR IP): Performed by: OPHTHALMOLOGY

## 2022-02-15 PROCEDURE — 2580000003 HC RX 258: Performed by: INTERNAL MEDICINE

## 2022-02-15 PROCEDURE — 96375 TX/PRO/DX INJ NEW DRUG ADDON: CPT

## 2022-02-15 PROCEDURE — 6360000004 HC RX CONTRAST MEDICATION: Performed by: INTERNAL MEDICINE

## 2022-02-15 PROCEDURE — G0378 HOSPITAL OBSERVATION PER HR: HCPCS

## 2022-02-15 PROCEDURE — 83036 HEMOGLOBIN GLYCOSYLATED A1C: CPT

## 2022-02-15 PROCEDURE — 96372 THER/PROPH/DIAG INJ SC/IM: CPT

## 2022-02-15 PROCEDURE — 6360000002 HC RX W HCPCS: Performed by: INTERNAL MEDICINE

## 2022-02-15 RX ORDER — SODIUM CHLORIDE 0.9 % (FLUSH) 0.9 %
10 SYRINGE (ML) INJECTION PRN
Status: DISCONTINUED | OUTPATIENT
Start: 2022-02-15 | End: 2022-02-16 | Stop reason: HOSPADM

## 2022-02-15 RX ORDER — POTASSIUM CHLORIDE 7.45 MG/ML
10 INJECTION INTRAVENOUS PRN
Status: DISCONTINUED | OUTPATIENT
Start: 2022-02-15 | End: 2022-02-16 | Stop reason: HOSPADM

## 2022-02-15 RX ORDER — TRAZODONE HYDROCHLORIDE 50 MG/1
100 TABLET ORAL NIGHTLY
Status: DISCONTINUED | OUTPATIENT
Start: 2022-02-15 | End: 2022-02-16 | Stop reason: HOSPADM

## 2022-02-15 RX ORDER — TOBRAMYCIN AND DEXAMETHASONE 3; 1 MG/ML; MG/ML
1 SUSPENSION/ DROPS OPHTHALMIC
Status: DISCONTINUED | OUTPATIENT
Start: 2022-02-15 | End: 2022-02-16 | Stop reason: HOSPADM

## 2022-02-15 RX ORDER — MAGNESIUM SULFATE 1 G/100ML
1000 INJECTION INTRAVENOUS PRN
Status: DISCONTINUED | OUTPATIENT
Start: 2022-02-15 | End: 2022-02-16 | Stop reason: HOSPADM

## 2022-02-15 RX ORDER — ASPIRIN 81 MG/1
81 TABLET ORAL DAILY
Status: DISCONTINUED | OUTPATIENT
Start: 2022-02-15 | End: 2022-02-16 | Stop reason: HOSPADM

## 2022-02-15 RX ORDER — PALIPERIDONE 3 MG/1
9 TABLET, EXTENDED RELEASE ORAL EVERY MORNING
Status: DISCONTINUED | OUTPATIENT
Start: 2022-02-15 | End: 2022-02-16 | Stop reason: HOSPADM

## 2022-02-15 RX ORDER — INSULIN GLARGINE 100 [IU]/ML
30 INJECTION, SOLUTION SUBCUTANEOUS NIGHTLY
Status: DISCONTINUED | OUTPATIENT
Start: 2022-02-15 | End: 2022-02-15

## 2022-02-15 RX ORDER — ACETAMINOPHEN 325 MG/1
650 TABLET ORAL EVERY 6 HOURS PRN
Status: DISCONTINUED | OUTPATIENT
Start: 2022-02-15 | End: 2022-02-16 | Stop reason: HOSPADM

## 2022-02-15 RX ORDER — POTASSIUM CHLORIDE 20 MEQ/1
40 TABLET, EXTENDED RELEASE ORAL PRN
Status: DISCONTINUED | OUTPATIENT
Start: 2022-02-15 | End: 2022-02-16 | Stop reason: HOSPADM

## 2022-02-15 RX ORDER — DEXTROSE MONOHYDRATE 25 G/50ML
INJECTION, SOLUTION INTRAVENOUS
Status: DISPENSED
Start: 2022-02-15 | End: 2022-02-15

## 2022-02-15 RX ORDER — SODIUM CHLORIDE 9 MG/ML
25 INJECTION, SOLUTION INTRAVENOUS PRN
Status: DISCONTINUED | OUTPATIENT
Start: 2022-02-15 | End: 2022-02-16 | Stop reason: HOSPADM

## 2022-02-15 RX ORDER — ACETAMINOPHEN 650 MG/1
650 SUPPOSITORY RECTAL EVERY 6 HOURS PRN
Status: DISCONTINUED | OUTPATIENT
Start: 2022-02-15 | End: 2022-02-16 | Stop reason: HOSPADM

## 2022-02-15 RX ORDER — INSULIN GLARGINE 100 [IU]/ML
15 INJECTION, SOLUTION SUBCUTANEOUS NIGHTLY
Status: DISCONTINUED | OUTPATIENT
Start: 2022-02-15 | End: 2022-02-16 | Stop reason: HOSPADM

## 2022-02-15 RX ORDER — GABAPENTIN 300 MG/1
600 CAPSULE ORAL 3 TIMES DAILY
Status: DISCONTINUED | OUTPATIENT
Start: 2022-02-15 | End: 2022-02-16 | Stop reason: HOSPADM

## 2022-02-15 RX ORDER — LISINOPRIL 20 MG/1
40 TABLET ORAL DAILY
Status: DISCONTINUED | OUTPATIENT
Start: 2022-02-15 | End: 2022-02-16 | Stop reason: HOSPADM

## 2022-02-15 RX ORDER — ATORVASTATIN CALCIUM 10 MG/1
20 TABLET, FILM COATED ORAL NIGHTLY
Status: DISCONTINUED | OUTPATIENT
Start: 2022-02-15 | End: 2022-02-16 | Stop reason: HOSPADM

## 2022-02-15 RX ORDER — LANOLIN ALCOHOL/MO/W.PET/CERES
3 CREAM (GRAM) TOPICAL NIGHTLY PRN
Status: DISCONTINUED | OUTPATIENT
Start: 2022-02-15 | End: 2022-02-16 | Stop reason: HOSPADM

## 2022-02-15 RX ORDER — OXYCODONE HYDROCHLORIDE AND ACETAMINOPHEN 5; 325 MG/1; MG/1
1 TABLET ORAL EVERY 4 HOURS PRN
Status: DISCONTINUED | OUTPATIENT
Start: 2022-02-15 | End: 2022-02-16 | Stop reason: HOSPADM

## 2022-02-15 RX ORDER — PANTOPRAZOLE SODIUM 40 MG/1
40 TABLET, DELAYED RELEASE ORAL DAILY
Status: DISCONTINUED | OUTPATIENT
Start: 2022-02-15 | End: 2022-02-16 | Stop reason: HOSPADM

## 2022-02-15 RX ORDER — FENTANYL CITRATE 50 UG/ML
25 INJECTION, SOLUTION INTRAMUSCULAR; INTRAVENOUS
Status: DISCONTINUED | OUTPATIENT
Start: 2022-02-15 | End: 2022-02-15

## 2022-02-15 RX ORDER — ONDANSETRON 4 MG/1
4 TABLET, ORALLY DISINTEGRATING ORAL EVERY 8 HOURS PRN
Status: DISCONTINUED | OUTPATIENT
Start: 2022-02-15 | End: 2022-02-16 | Stop reason: HOSPADM

## 2022-02-15 RX ORDER — ONDANSETRON 2 MG/ML
4 INJECTION INTRAMUSCULAR; INTRAVENOUS EVERY 6 HOURS PRN
Status: DISCONTINUED | OUTPATIENT
Start: 2022-02-15 | End: 2022-02-16 | Stop reason: HOSPADM

## 2022-02-15 RX ORDER — DEXTROSE MONOHYDRATE 25 G/50ML
INJECTION, SOLUTION INTRAVENOUS
Status: COMPLETED
Start: 2022-02-15 | End: 2022-02-15

## 2022-02-15 RX ADMIN — PALIPERIDONE 9 MG: 3 TABLET, EXTENDED RELEASE ORAL at 10:30

## 2022-02-15 RX ADMIN — TICAGRELOR 90 MG: 90 TABLET ORAL at 09:16

## 2022-02-15 RX ADMIN — GABAPENTIN 600 MG: 300 CAPSULE ORAL at 09:16

## 2022-02-15 RX ADMIN — GABAPENTIN 600 MG: 300 CAPSULE ORAL at 21:14

## 2022-02-15 RX ADMIN — INSULIN GLARGINE 15 UNITS: 100 INJECTION, SOLUTION SUBCUTANEOUS at 21:14

## 2022-02-15 RX ADMIN — ATORVASTATIN CALCIUM 20 MG: 10 TABLET, FILM COATED ORAL at 21:14

## 2022-02-15 RX ADMIN — SODIUM CHLORIDE, PRESERVATIVE FREE 10 ML: 5 INJECTION INTRAVENOUS at 09:16

## 2022-02-15 RX ADMIN — TOBRAMYCIN AND DEXAMETHASONE 1 DROP: 3; 1 SUSPENSION/ DROPS OPHTHALMIC at 18:17

## 2022-02-15 RX ADMIN — TOBRAMYCIN AND DEXAMETHASONE 1 DROP: 3; 1 SUSPENSION/ DROPS OPHTHALMIC at 16:20

## 2022-02-15 RX ADMIN — TICAGRELOR 90 MG: 90 TABLET ORAL at 21:14

## 2022-02-15 RX ADMIN — TOBRAMYCIN AND DEXAMETHASONE 1 DROP: 3; 1 SUSPENSION/ DROPS OPHTHALMIC at 21:13

## 2022-02-15 RX ADMIN — ENOXAPARIN SODIUM 30 MG: 30 INJECTION SUBCUTANEOUS at 09:16

## 2022-02-15 RX ADMIN — TOBRAMYCIN AND DEXAMETHASONE 1 DROP: 3; 1 SUSPENSION/ DROPS OPHTHALMIC at 14:30

## 2022-02-15 RX ADMIN — ASPIRIN 81 MG: 81 TABLET, COATED ORAL at 09:16

## 2022-02-15 RX ADMIN — TRAZODONE HYDROCHLORIDE 100 MG: 50 TABLET ORAL at 21:14

## 2022-02-15 RX ADMIN — OXYCODONE AND ACETAMINOPHEN 1 TABLET: 5; 325 TABLET ORAL at 14:30

## 2022-02-15 RX ADMIN — LISINOPRIL 40 MG: 20 TABLET ORAL at 09:16

## 2022-02-15 RX ADMIN — PANTOPRAZOLE SODIUM 40 MG: 40 TABLET, DELAYED RELEASE ORAL at 09:16

## 2022-02-15 RX ADMIN — INSULIN LISPRO 12 UNITS: 100 INJECTION, SOLUTION INTRAVENOUS; SUBCUTANEOUS at 12:31

## 2022-02-15 RX ADMIN — TOBRAMYCIN AND DEXAMETHASONE 1 DROP: 3; 1 SUSPENSION/ DROPS OPHTHALMIC at 12:33

## 2022-02-15 RX ADMIN — INSULIN LISPRO 8 UNITS: 100 INJECTION, SOLUTION INTRAVENOUS; SUBCUTANEOUS at 10:31

## 2022-02-15 RX ADMIN — GABAPENTIN 600 MG: 300 CAPSULE ORAL at 14:30

## 2022-02-15 RX ADMIN — TOBRAMYCIN AND DEXAMETHASONE 1 DROP: 3; 1 SUSPENSION/ DROPS OPHTHALMIC at 10:31

## 2022-02-15 RX ADMIN — OXYCODONE AND ACETAMINOPHEN 1 TABLET: 5; 325 TABLET ORAL at 09:16

## 2022-02-15 RX ADMIN — GADOTERIDOL 10 ML: 279.3 INJECTION, SOLUTION INTRAVENOUS at 13:03

## 2022-02-15 RX ADMIN — ONDANSETRON 4 MG: 2 INJECTION INTRAMUSCULAR; INTRAVENOUS at 16:45

## 2022-02-15 RX ADMIN — FENTANYL CITRATE 25 MCG: 50 INJECTION, SOLUTION INTRAMUSCULAR; INTRAVENOUS at 03:02

## 2022-02-15 RX ADMIN — INSULIN LISPRO 2 UNITS: 100 INJECTION, SOLUTION INTRAVENOUS; SUBCUTANEOUS at 21:14

## 2022-02-15 RX ADMIN — Medication 15 G: at 07:45

## 2022-02-15 RX ADMIN — DEXTROSE MONOHYDRATE 50 ML: 25 INJECTION, SOLUTION INTRAVENOUS at 16:13

## 2022-02-15 ASSESSMENT — PAIN DESCRIPTION - PROGRESSION
CLINICAL_PROGRESSION: NOT CHANGED

## 2022-02-15 ASSESSMENT — PAIN SCALES - GENERAL
PAINLEVEL_OUTOF10: 8
PAINLEVEL_OUTOF10: 0
PAINLEVEL_OUTOF10: 5
PAINLEVEL_OUTOF10: 5
PAINLEVEL_OUTOF10: 8
PAINLEVEL_OUTOF10: 0

## 2022-02-15 ASSESSMENT — PAIN SCALES - WONG BAKER: WONGBAKER_NUMERICALRESPONSE: 0

## 2022-02-15 NOTE — PROGRESS NOTES
Ophthalmology Brief Consult Note:    HPI:  58 yo BF admitted yesterday. She c/o pain, light sensitivity and decreased vision OD since Monday. EXAM:  NVa cc 20/60 OD  20/50 OS  Pupils 2mm OU, reactive, no APd  IOP 15 OD, 20 OS (Bunch)  Anterior Segment ? trace injection OD, quiet OS, mild cataracts  Fundus deferred    IMP:  Symptoms consistent with mild iritis OD. She appears to be responding nicely to Tobradex (atb/steroid) eye drops started this morning. Not an acute glaucomatous process as IOP normal OU. Also, not significant vision loss as visual acuity similar to OS. REC:  Continue drops Q2hr WA while here, then taper to QID on discharge as long as symptoms remain improved. Follow up in office later this week or next. Thank you.     Braulio Boyd, [address]

## 2022-02-15 NOTE — PROGRESS NOTES
Blood sugar is now 281. Pt is alert and oriented to self, place and time at this time. Confused to situation. Vitals stable. Dr. Juliana Miles to adjust insulin orders at this time.

## 2022-02-15 NOTE — PLAN OF CARE
Problem: OXYGENATION/RESPIRATORY FUNCTION  Goal: Patient will maintain patent airway  Outcome: Ongoing  Note:   Patient's EF (Ejection Fraction) is greater than 40%    Heart Failure Medications:  Diuretics[de-identified] None    (One of the following REQUIRED for EF </= 40%/SYSTOLIC FAILURE but MAY be used in EF% >40%/DIASTOLIC FAILURE)        ACE[de-identified] Lisinopril        ARB[de-identified] None         ARNI[de-identified] None    (Beta Blockers)  NON- Evidenced Based Beta Blocker (for EF% >40%/DIASTOLIC FAILURE): None    Evidenced Based Beta Blocker::(REQUIRED for EF% <40%/SYSTOLIC FAILURE) None  . .................................................................................................................................................. Patient's weights and intake/output reviewed: Yes    Patient's Last Weight: 113 lbs obtained by standing scale. Difference of 2 lbs less than last documented weight.       Intake/Output Summary (Last 24 hours) at 2/15/2022 1836  Last data filed at 2/15/2022 1340  Gross per 24 hour   Intake 480 ml   Output 0 ml   Net 480 ml       Comorbidities Reviewed Yes    Patient has a past medical history of Abnormal brain MRI, Acute bilateral low back pain without sciatica, SHARRON (acute kidney injury) ([address] Arthritis, Bipolar disorder ([address] CAD (coronary artery disease), Cancer ([address] Carotid stenosis, bilateral:<50%:per [place] 7/2016, Carpal tunnel syndrome, Cervical cancer screening, Coronary artery disease of native artery of native heart with stable angina pectoris ([address] DDD (degenerative disc disease), lumbar, Depression, Depression/anxiety, Depression/anxiety, Diabetes mellitus ([address] Gout, History of mammogram, History of therapeutic radiation, Hyperlipidemia, Hypertension, 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 stage 3 chronic kidney disease ([facility] Microalbuminuria, Neuropathy in diabetes ([facility] Non morbid obesity, Pancreatitis, S/P endoscopy, Scoliosis, Spondylosis of lumbar region without myelopathy or radiculopathy, Transient cerebral ischemia, and Unspecified cerebral artery occlusion with cerebral infarction. >>For CHF and Comorbidity documentation on Education Time and Topics, please see Education Tab    Progressive Mobility Assessment:  What is this patient's Current Level of Mobility?: Ambulatory- with Assistance  How was this patient Mobilized today?: Edge of Bed,  Up to Toilet/Shower, and Up in Room, ambulated 25 ft                 With Whom? Nurse, PCA, and Self                 Level of Difficulty/Assistance: 1x Assist     Pt resting in bed at this time on room air. Pt denies shortness of breath. Pt without lower extremity edema. Patient and/or Family's stated Goal of Care this Admission: increase activity tolerance, be more comfortable, and reduce lower extremity edema prior to discharge        :      Problem: Pain:  Goal: Pain level will decrease  Description: Pain level will decrease  Outcome: Ongoing  Note: Pt will be satisfied with pain control. Pt uses numeric pain rating scale with reassessments after pain med administration. Will continue to monitor progression throughout shift. Problem: Skin Integrity:  Goal: Will show no infection signs and symptoms  Description: Will show no infection signs and symptoms  Outcome: Ongoing     Problem: Falls - Risk of:  Goal: Will remain free from falls  Description: Will remain free from falls  Outcome: Ongoing  Note: Pt will remain free from falls throughout hospital stay. Fall precautions in place, bed alarm on, bed in lowest position with wheels locked and side rails 2/4 up. Room door open and hourly rounding completed. Will continue to monitor throughout shift.       Problem: Serum Glucose Level - Abnormal:  Goal: Ability to maintain appropriate glucose levels will improve  Description: Ability to maintain appropriate glucose levels will improve  Outcome: Ongoing  Note: Pt will have accuchecks before meals and at bedtime with sliding scale insulin in place for coverage. Will continue to monitor for signs and symptoms of hypoglycemia and hyperglycemia throughout shift.

## 2022-02-15 NOTE — PROGRESS NOTES
Orthostatic B/P and pulse completed  Lying:  /77  HR 56     Sitting:  /82   HR 69     Standing:  /84  HR 70

## 2022-02-15 NOTE — CODE DOCUMENTATION
Rapid initiated at this time. pts blood sugar 40 at this time. Pt unresponsive.    Vitals:    02/15/22 0754 02/15/22 1231 02/15/22 1429 02/15/22 1612   BP: 118/69 (!) 144/71  (!) 152/70   Pulse: 78 63  85   Resp: 16 16     Temp: 97.7 °F (36.5 °C) 99.3 °F (37.4 °C)  98.1 °F (36.7 °C)   TempSrc: Axillary Oral  Axillary   SpO2: 99% 100%  93%   Weight:       Height:   5' 3\" (1.6 m)

## 2022-02-15 NOTE — SIGNIFICANT EVENT
Rapid response called to bedside for hypoglycemia. Patient's blood glucose was reportedly 40 and patient received half amp of D50 by nursing. On my arrival, patient seen with her friend at bedside. Patient blood glucose on repeat checks couple of times showed 301 and 281 respectively. Patient more awake, alert, oriented. Reviewed insulin dosing. Patient appears to have high sensitivity to insulin and her blood glucose has been erratic. Adjusted insulin dosing. Premeal insulin dropped down to 4 units and will continue medium scale insulin. Will follow.     Guillermo Jackson MD  Hospitalist Physician

## 2022-02-15 NOTE — CARE COORDINATION
CASE MANAGEMENT INITIAL ASSESSMENT      Reviewed chart and completed assessment with patient:at bedside  Explained Case Management role/services. Primary contact information:see below    Health Care Decision Maker :   Secondary Decision Maker (Active): Lolis Correa Child - [phone]    Secondary Decision Maker: Damir Cole - Other - [phone]    Secondary Decision Maker: Jean Camarillo - Other - [phone]    Supplemental (Other) Decision Maker: Margaritamri Mefilipe - Other - [phone]    Supplemental (Other) Decision Maker: Gisella Zamarripa - Other - [phone]          Can this person be reached and be able to respond quickly, such as within a few minutes or hours? Yes    Admit date/status:02/14/2022  Diagnosis:Symptomatic bradycardia   Is this a Readmission?:  No      [address] required for SNF: Yes       3 night stay required: No    Living arrangements, Adls, care needs, prior to admission:IPTA, lives in a 3rd floor apartment w/3 flights of stairs, family assists w/driving    Transportation:private     Durable Medical Equipment at home:  Walker_x_Cane_x_RTS__ BSC__Shower Chair__  02__ HHN__ CPAP__  BiPap__  Hospital Bed__ W/C___ Other__________    Services in the home and/or outpatient, prior to [address] (if applicable)   · Name:  · Address:  · Dialysis Schedule:  · Phone:  · Fax:    PT/OT recs:none    Hospital Exemption Notification (HEN):not initiated    Barriers to discharge:none    Plan/comments:Pt plans to d/c back to her apartment. Family/friends will transport. Pt is a/o, ambulatory. CM will continue to follow for needs.   Rose Mary Villar RN       ECOC on chart for MD signature

## 2022-02-15 NOTE — PLAN OF CARE
Problem: Nutrition  Goal: Optimal nutrition therapy  Outcome: Ongoing  Note: Nutrition Problem #1: Altered nutrition-related lab values  Intervention: Food and/or Nutrient Delivery: Continue Current Diet,Start Oral Nutrition Supplement  Nutritional Goals: Pt will have PO intakes of 50% or greater during stay.

## 2022-02-15 NOTE — PROGRESS NOTES
Hospitalist Progress Note      PCP: Luba Nguyen    Date of Admission: 2/14/2022    Chief Complaint: dizziness     Hospital Course:   Merilyn Claude is a 57 y.o. female. She presented to the ER from home by ambulance. She relates she awoke this morning with right eye pain and tearing as well as generalized weakness. She has felt too weak to walk and her legs were buckling beneath her.     Patient relates she felt completely normal yesterday. She has maintained a strong appetite and oral intake. She denies rigors, sweats, nausea and states even now she does not feel acutely ill, just weak.     Patient was admitted 8/8 - 11/21 for similar reasons including right eye pain. Similar to that admission she also describes aching/pressure-like pain in the right frontal area. She describes her vision in the right eye as blurry. She denies diplopia, magnolia visual loss, and  jaw claudication. When she was hospitalized in August, 2021 she underwent biopsy of the right temporal artery. There were no pathologic findings of temporal arteritis at that time.     Patient related she was surprised by her blood glucose which was over 400 when she arrived. She states she took her lantus insulin this morning (26 units). Subjective: still with significant eye pain with redness and vision changes. Awaiting optho recs.         Medications:  Reviewed    Infusion Medications    sodium chloride      dextrose       Scheduled Medications    aspirin  81 mg Oral Daily    atorvastatin  20 mg Oral Nightly    ticagrelor  90 mg Oral BID    lisinopril  40 mg Oral Daily    gabapentin  600 mg Oral TID    paliperidone  9 mg Oral QAM    pantoprazole  40 mg Oral Daily    traZODone  100 mg Oral Nightly    enoxaparin  30 mg SubCUTAneous Daily    dextrose        insulin lispro  0-12 Units SubCUTAneous TID WC    insulin lispro  0-6 Units SubCUTAneous Nightly    tobramycin-dexamethasone  1 drop Right Eye Q2H While awake PRN Meds: sodium chloride flush, sodium chloride, potassium chloride **OR** potassium alternative oral replacement **OR** potassium chloride, magnesium sulfate, ondansetron **OR** ondansetron, acetaminophen **OR** acetaminophen, melatonin, oxyCODONE-acetaminophen, glucose, dextrose bolus (hypoglycemia), glucagon (rDNA), dextrose      Intake/Output Summary (Last 24 hours) at 2/15/2022 1059  Last data filed at 2/15/2022 0756  Gross per 24 hour   Intake 1240 ml   Output 0 ml   Net 1240 ml       Physical Exam Performed:    /69   Pulse 78   Temp 97.7 °F (36.5 °C) (Axillary)   Resp 16   Ht 5' 3\" (1.6 m)   Wt 113 lb 14.4 oz (51.7 kg)   SpO2 99%   BMI 20.18 kg/m²     General appearance: No apparent distress, appears stated age and cooperative. HEENT: Right eye erythema and tenderness. Left eye conjunctiva clear  Neck: Supple, with full range of motion. No jugular venous distention. Trachea midline. Respiratory:  Normal respiratory effort. Clear to auscultation, bilaterally without Rales/Wheezes/Rhonchi. Cardiovascular: Regular rate and rhythm with normal S1/S2 without murmurs, rubs or gallops. Abdomen: Soft, non-tender, non-distended with normal bowel sounds. Musculoskeletal: No clubbing, cyanosis or edema bilaterally. Full range of motion without deformity. Skin: Skin color, texture, turgor normal.  No rashes or lesions. Neurologic:  Neurovascularly intact without any focal sensory/motor deficits.  Cranial nerves: II-XII intact, grossly non-focal.  Psychiatric: Alert and oriented, thought content appropriate, normal insight  Capillary Refill: Brisk,3 seconds, normal   Peripheral Pulses: +2 palpable, equal bilaterally       Labs:   Recent Labs     02/14/22  1519   WBC 5.4   HGB 11.1*   HCT 33.9*        Recent Labs     02/14/22  1519      K 4.4   CL 97*   CO2 27   BUN 19   CREATININE 1.1   CALCIUM 9.5     Recent Labs     02/14/22  1519   AST 31   ALT 66*   BILITOT <0.2   ALKPHOS 215* No results for input(s): INR in the last 72 hours. Recent Labs     02/14/22  1519   TROPONINI <0.01       Urinalysis:      Lab Results   Component Value Date    NITRU Negative 02/14/2022    WBCUA 0-2 02/14/2022    BACTERIA Rare 02/14/2022    RBCUA 3-4 02/14/2022    BLOODU TRACE-INTACT 02/14/2022    SPECGRAV 1.015 02/14/2022    GLUCOSEU >=1000 02/14/2022    GLUCOSEU >=1000 mg/dL 06/07/2010       Radiology:  CTA HEAD NECK W CONTRAST   Final Result   No acute abnormality or flow-limiting stenosis of the major arteries of the   head and neck. RECOMMENDATIONS:   Unavailable         XR CHEST 1 VIEW   Final Result   No acute cardiopulmonary findings. CT HEAD WO CONTRAST   Final Result   No acute intracranial abnormality. Stable right maxillary sinus opacification. MRI BRAIN W WO CONTRAST    (Results Pending)           Assessment/Plan:    Active Hospital Problems    Diagnosis     Symptomatic bradycardia [R00.1]     CAD S/P percutaneous coronary angioplasty [I25.10, Z98.61]     Stage 3a chronic kidney disease ([facility] [N18.31]     Uncontrolled type 2 diabetes mellitus with microalbuminuria, with long-term current use of insulin (HCC) [E11.29, E11.65, R80.9, Z79.4]     History of breast cancer [Z85.3]     Essential hypertension [I10]      Right eye pain  - ophthalmology consulted  - eye drops ordered  - MRI brain ordered  - ESR, CRP pending. Prior temporal artery biopsy negative    Bradycardia  - sinus overnight.  Currently in NSR  - tele  - if recurs with symptoms, will order stress test    Generalized weakness  - likely multifactorial  - has had several admissions in the past year  - supportive care    DMII  - poorly controlled  - holding home oral meds  - SSI ordered    CAD  - no active chest pain  - continue home ASA, brilinta, statin    HTN  - well controlled  - continue home lisinopril    HLD  - continue home statin    CKD III  - Cr at baseline  - continue to monitor    Bipolar disorder  - mood stable  - continue home meds    DVT Prophylaxis: lovenox  Diet: ADULT DIET; Regular; 3 carb choices (45 gm/meal);  Low Fat/Low Chol/High Fiber/FAUZIA  Code Status: Full Code    PT/OT Eval Status: not ordered    Dispo - home in 1-2 days    Efra Leyva MD

## 2022-02-15 NOTE — PROGRESS NOTES
Comprehensive Nutrition Assessment    Type and Reason for Visit:  Positive Nutrition Screen,Initial    Nutrition Recommendations/Plan:   1. Continue heart healthy, carb controlled diet  2. RD to order Glucerna (vanilla or strawberry) - to promote weight gain  3. Encourage PO intakes  4. Monitor nutrition adequacy, pertinent labs, bowel habits, wt changes, and clinical progress    Nutrition Assessment:  Positive screen for weight loss: Pt presented to [facility] with dizziness, headache, and blurry vision with blood sugar of 428 mg/dl upon arrival. PMH of DM, CVA, CHF, CAD, CKD stage 3a, HTN, and right temporal artery biopsy 8/21. Pt reported losing weight steadily x2 yrs with  lbs, weight fluctuations over past year per EMR with no significant weight loss. Pt reported good appetite today and PTA with PO intakes of 100%. Pt requested Glucerna despite good intake, RD to order qd. Provided carb counting handout and education on what foods have carbs, carb servings, reading labels, and pairing fat/protein with carbs. Will continue to monitor. Malnutrition Assessment:  Malnutrition Status:  No malnutrition    Context:  Acute Illness       Estimated Daily Nutrient Needs:  Energy (kcal):  2142-1087; Weight Used for Energy Requirements:  Current (51 kg)     Protein (g):  51-61; Weight Used for Protein Requirements:  Current (1-1.2)        Method Used for Fluid Requirements:  1 ml/kcal      Nutrition Related Findings:  A1c 11.5. -429 x24 hrs. +BM 2/15. Wounds:  None       Current Nutrition Therapies:    ADULT DIET; Regular; 3 carb choices (45 gm/meal); Low Fat/Low Chol/High Fiber/FAUZIA    Anthropometric Measures:  · Height: 5' 3\" (160 cm)  · Current Body Weight: 113 lb 14.4 oz (51.7 kg)   · Admission Body Weight: 115 lb (52.2 kg)    · Ideal Body Weight: 115 lbs; % Ideal Body Weight 99 %   · BMI: 20.2  · BMI Categories: Normal Weight (BMI 18.5-24. 9)       Nutrition Diagnosis:   Altered nutrition-related lab values related to food and nutrition related knowledge deficit as evidenced by lab values (A1c 11.5). Nutrition Interventions:   Food and/or Nutrient Delivery:  Continue Current Diet,Start Oral Nutrition Supplement  Nutrition Education/Counseling:  Education completed   Coordination of Nutrition Care:  Continue to monitor while inpatient    Goals:  Pt will have PO intakes of 50% or greater during stay.        Nutrition Monitoring and Evaluation:   Behavioral-Environmental Outcomes:  Knowledge or Skill   Food/Nutrient Intake Outcomes:  Food and Nutrient Intake  Physical Signs/Symptoms Outcomes:  Biochemical Data,Nutrition Focused Physical Findings,Weight     Discharge Planning:    Continue current diet     Electronically signed by Jason Galvan on 2/15/22 at 2:57 PM EST    Contact: [phone]

## 2022-02-15 NOTE — H&P
Hospital Medicine History & Physical      Patient:  Almas Phelps  :   1960  MRN:   [id number]  Date of Service: 22    Chief Complaint   Patient presents with    Dizziness     Patient arrives via ems with c/o dizziness and blurred vision since 0900. Patient states she took her percocet at 0900. Patient DM2, Sugars have been running high. HISTORY OF PRESENT ILLNESS:    Almas Phelps is a 57 y.o. female. She presented to the ER from home by ambulance. She relates she awoke this morning with right eye pain and tearing as well as generalized weakness. She has felt too weak to walk and her legs were buckling beneath her. Patient relates she felt completely normal yesterday. She has maintained a strong appetite and oral intake. She denies rigors, sweats, nausea and states even now she does not feel acutely ill, just weak. Patient was admitted  -  for similar reasons including right eye pain. Similar to that admission she also describes aching/pressure-like pain in the right frontal area. She describes her vision in the right eye as blurry. She denies diplopia, magnolia visual loss, and  jaw claudication. When she was hospitalized in  she underwent biopsy of the right temporal artery. There were no pathologic findings of temporal arteritis at that time. Patient related she was surprised by her blood glucose which was over 400 when she arrived. She states she took her lantus insulin this morning (26 units). Review of Systems:  All pertinent positives and negatives are as noted in the HPI section. All other systems were reviewed and are negative.     Past Medical History:   Diagnosis Date    Abnormal brain MRI 2017    Partially empty sella and minimal chronic small vessel ischemic disease    Acute bilateral low back pain without sciatica 2016    [facility]ARRON (acute kidney [facility]) ([facility] 2017    Arthritis     back    Bipolar disorder ([facility] 10/18/2008  CAD (coronary artery disease)     stent placed 6/8/20    Cancer [facility]) 2015    bilateral breast:s/p lumpectomy/radiation:under care care of breast specialist:Dr. Boone     Carotid stenosis, bilateral:<50%:per US 7/2016 7/15/2016    Carpal tunnel syndrome 10/18/2008    Cervical cancer screening 2014    Nml per pt'.  Coronary artery disease of native artery of native heart with stable angina pectoris ([facility] 6/9/2020    DDD (degenerative disc disease), lumbar 7/18/2018    Depression     under care of pschiatrist:Dr. 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     Scoliosis     Spondylosis of lumbar region without myelopathy or radiculopathy 3/10/2017    Transient cerebral ischemia 07/15/2016    TIA:7/10/16    Unspecified cerebral artery occlusion with cerebral infarction     TIA       Past Surgical History:   Procedure Laterality Date    BREAST LUMPECTOMY  2015    Bilateral:breast cancer    CARDIAC CATHETERIZATION  06/08/2020    Dr. Mccoy Sites), DAYANA to Diag 1    COLONOSCOPY N/A 2/1/2021    COLONOSCOPY DIAGNOSTIC performed by Michelle Mckeon MD at [place] [address]  2/3/2021    CT BONE MARROW BIOPSY 2/3/2021 Alvino Zamora MD [facility] CT SCAN    HYSTERECTOMY      Benign:no cervical cancer per pt'    KIDNEY REMOVAL      right    OTHER SURGICAL HISTORY Right     orif right ankle    TEMPORAL ARTERY BIOPSY Right 8/9/2021    RIGHT TEMPORAL ARTERY BIOPSY LIGATION performed by Iesha Fleming MD at [facility] ENDOSCOPY N/A 1/29/2021    EGD BIOPSY performed by Lou Petersen MD at [address]         Prior to Admission medications    Medication Sig Start Date End Date Taking? Authorizing Provider   glimepiride (AMARYL) 4 MG tablet 4 mg with breakfast or first meal of the day 1/12/22   Toni Salazar MD   BRILINTA 90 MG TABS tablet TAKE 1 TABLET BY MOUTH TWICE A DAY 12/10/21   Jaswant Artis MD   VICTOZA 18 MG/3ML SOPN SC injection INJECT 1.8 MG UNDER THE SKIN ONCE DAILY 12/9/21   Toni Salazar MD   Blood Glucose Monitoring Suppl (TRUE METRIX METER) w/Device KIT Used to  check blood sugar 4 times eyad  Patient taking differently: Used to  check blood sugar 3 times eyad 11/24/21   Toni Salazar MD   ondansetron (ZOFRAN ODT) 4 MG disintegrating tablet Take 1 tablet by mouth every 8 hours as needed for Nausea 11/22/21   SARTHAK Olguin - CNP   lisinopril (PRINIVIL;ZESTRIL) 40 MG tablet Take 1 tablet by mouth daily 11/17/21   Toni Salazar MD   atorvastatin (LIPITOR) 20 MG tablet TAKE 1 TABLET BY MOUTH EVERY DAY 11/17/21   Toni Salazar MD   nitroGLYCERIN (NITROSTAT) 0.4 MG SL tablet up to max of 3 total doses.  If no relief after 1 dose, call 911. 9/29/21   Alek Bowden MD   insulin lispro, 1 Unit Dial, (HUMALOG KWIKPEN) 100 UNIT/ML SOPN Inject 9 Units into the skin 3 times daily     Historical Provider, MD   fluticasone (FLONASE) 50 MCG/ACT nasal spray 1 spray by Each Nostril route daily 8/12/21   Ana Boudreaux MD   insulin glargine (LANTUS SOLOSTAR) 100 UNIT/ML injection pen Inject 35 Units into the skin nightly  Patient taking differently: Inject 26 Units into the skin every morning  8/11/21   Ana Boudreaux MD   TRUE METRIX BLOOD GLUCOSE TEST strip USE AS DIRECTED TO TEST 4 TIMES A DAY 7/19/21   Rome He MD   gabapentin (NEURONTIN) 600 MG tablet Take 0.5 tablets by mouth 2 times daily for 3 days. Patient taking differently: Take 600 mg by mouth 3 times daily. 6/4/21 11/22/21  Alvino Willett MD   paliperidone (INVEGA) 9 MG extended release tablet Take 9 mg by mouth every morning  3/15/21   Historical Provider, MD   pantoprazole (PROTONIX) 40 MG tablet Take 40 mg by mouth daily  4/3/21   Historical Provider, MD   ferrous sulfate (IRON 325) 325 (65 Fe) MG tablet Take 325 mg by mouth daily (with breakfast)    Historical Provider, MD   oxyCODONE-acetaminophen (PERCOCET) 7.5-325 MG per tablet TAKE 1 TABLET BY MOUTH EVERY 6 (SIX) HOURS AS NEEDED FOR UP TO 28 DAYS. 3/11/21   Historical Provider, MD   clonazePAM (KLONOPIN) 0.5 MG tablet Take 0.5 mg by mouth 3 times daily as needed. 3/15/21   Historical Provider, MD   calcium carbonate-vitamin D (CALTRATE) 600-400 MG-UNIT TABS per tab Take 1 tablet by mouth daily  12/30/19   Historical Provider, MD   aspirin 81 MG tablet Take 81 mg by mouth daily    Historical Provider, MD   traZODone (DESYREL) 100 MG tablet Take 200 mg by mouth nightly     Historical Provider, MD       Allergies:   Morphine, Penicillins, Codeine, and Penicillin g    Social:   reports that she quit smoking about 7 years ago. Her smoking use included cigarettes and cigars. She has a 10.00 pack-year smoking history. She has never used smokeless tobacco.   reports no history of alcohol use. Social History     Substance and Sexual Activity   Drug Use No       Family History   Problem Relation Age of Onset    Cancer Mother         breast    Cancer Father     Heart Failure Neg Hx     High Cholesterol Neg Hx     Hypertension Neg Hx     Migraines Neg Hx     Rashes/Skin Problems Neg Hx     Seizures Neg Hx     Stroke Neg Hx     Thyroid Disease Neg Hx     Diabetes Neg Hx        PHYSICAL EXAM:  I performed this physical examination.     Vitals:  Patient Vitals for the past 24 hrs:   BP Temp Temp src Pulse Resp SpO2 Height Weight   02/14/22 1916 (!) 182/74 -- -- (!) 49 15 99 % -- --   02/14/22 1902 (!) 197/65 -- -- (!) 48 15 100 % -- --   02/14/22 1846 (!) 198/76 -- -- (!) 46 11 100 % -- --   02/14/22 1831 (!) 193/71 -- -- (!) 44 14 96 % -- --   02/14/22 1753 -- -- -- (!) 41 11 98 % -- --   02/14/22 1746 (!) 189/73 -- -- (!) 42 13 99 % -- --   02/14/22 1733 -- -- -- (!) 48 12 100 % -- --   02/14/22 1731 (!) 193/85 -- -- (!) 46 11 100 % -- --   02/14/22 1703 (!) 181/72 -- -- 50 14 100 % -- --   02/14/22 1631 (!) 180/70 -- -- -- -- -- -- --   02/14/22 1619 (!) 165/75 -- -- 59 -- 100 % -- --   02/14/22 1548 -- -- -- 54 14 -- -- --   02/14/22 1546 (!) 154/74 -- -- -- -- -- -- --   02/14/22 1545 -- -- -- -- -- 100 % -- --   02/14/22 1446 (!) 153/80 98 °F (36.7 °C) Oral 60 18 99 % 5' 3\" (1.6 m) 120 lb (54.4 kg)       Intake/Output Summary (Last 24 hours) at 2/14/2022 1938  Last data filed at 2/14/2022 1714  Gross per 24 hour   Intake 1000 ml   Output --   Net 1000 ml     Room air    GEN:  Appearance:  Age appropriate female in NAD . Level of Consciousness:  alert . Orientation:  full    HEENT: Sclera anicteric.  no conjunctival chemosis. moist mucus membranes. no specific or diagnostic oral lesions. NECK:  no signs of meningismus. Jugular veins not distended. Carotid pulses  2+.  no cervical lymphadenopathy. no thyromegaly. CV:  regular rhythm. normal S1 & S2.    no murmur. no rub.  no gallop. PULM:  Chest excursion is symmetric. Breath sounds are generally vesicular but overall diminished in general.  Adventitious sounds:  Coarse inspiratory crackles overlying the RLL    AB:  Abdominal shape is normal.  Bowel sounds are active. Generally soft to palpation. no tenderness is present. no involuntary guarding. no rebound guarding. EXTR:  Skin is warm. Capillary refill brisk. no specific or pathognomic rash. no clubbing. no pitting edema. no active wound or ulcer. Pulses 2+ x 4    NEURO: Cranial nerves 2-12 intact . Muscle tone:  normal.  Strength: 5/5 x 4. Pronator drift: absent. Sensation:  normal.        LABS:  Lab Results   Component Value Date    WBC 5.4 02/14/2022    HGB 11.1 (L) 02/14/2022    HCT 33.9 (L) 02/14/2022    MCV 86.9 02/14/2022     02/14/2022     Lab Results   Component Value Date    CREATININE 1.1 02/14/2022    BUN 19 02/14/2022     02/14/2022    K 4.4 02/14/2022    CL 97 (L) 02/14/2022    CO2 27 02/14/2022     Lab Results   Component Value Date    ALT 66 (H) 02/14/2022    AST 31 02/14/2022     (H) 06/03/2021    ALKPHOS 215 (H) 02/14/2022    BILITOT <0.2 02/14/2022     Lab Results   Component Value Date    CKTOTAL 120 06/03/2021    TROPONINI <0.01 02/14/2022     No results for input(s): PHART, DAC4RCU, PO2ART in the last 72 hours. IMAGING:  CT HEAD WO CONTRAST    Result Date: 2/14/2022  EXAMINATION: CT OF THE HEAD WITHOUT CONTRAST  2/14/2022 3:08 pm TECHNIQUE: CT of the head was performed without the administration of intravenous contrast. Dose modulation, iterative reconstruction, and/or weight based adjustment of the mA/kV was utilized to reduce the radiation dose to as low as reasonably achievable. COMPARISON: 08/08/2021 HISTORY: ORDERING SYSTEM PROVIDED HISTORY: blurred vision right eye TECHNOLOGIST PROVIDED HISTORY: Reason for exam:->blurred vision right eye Has a \"code stroke\" or \"stroke alert\" been called? ->No Decision Support Exception - unselect if not a suspected or confirmed emergency medical condition->Emergency Medical Condition (MA) Reason for Exam: blurry vision in right eye since this morning FINDINGS: BRAIN/VENTRICLES: There is no acute intracranial hemorrhage, mass effect or midline shift. No abnormal extra-axial fluid collection. The gray-white differentiation is maintained without evidence of an acute infarct. There is no evidence of hydrocephalus.  ORBITS: The visualized portion of the orbits demonstrate no acute abnormality. SINUSES: There is stable opacification of the right maxillary sinus and adjacent ethmoid air cells. The remainder of the paranasal sinuses and mastoid air cells are clear. SOFT TISSUES/SKULL:  No acute abnormality of the visualized skull or soft tissues. No acute intracranial abnormality. Stable right maxillary sinus opacification. XR CHEST 1 VIEW    Result Date: 2/14/2022  EXAMINATION: ONE XRAY VIEW OF THE CHEST 2/14/2022 3:26 pm COMPARISON: Chest radiograph 11/22/2021 HISTORY: ORDERING SYSTEM PROVIDED HISTORY: dizziness TECHNOLOGIST PROVIDED HISTORY: Reason for exam:->dizziness Reason for Exam: dizziness FINDINGS: Lungs are clear. No effusion or pneumothorax. Cardiomediastinal silhouette is stable with mild tortuosity of the descending thoracic aorta. No enlargement of the cardiac silhouette. Surgical clips project over the right lower lung zone. No acute cardiopulmonary findings. CTA HEAD NECK W CONTRAST    Result Date: 2/14/2022  EXAMINATION: CTA OF THE HEAD AND NECK WITH CONTRAST 2/14/2022 4:25 pm: TECHNIQUE: CTA of the head and neck was performed with the administration of intravenous contrast. Multiplanar reformatted images are provided for review. MIP images are provided for review. Stenosis of the internal carotid arteries measured using NASCET criteria. Dose modulation, iterative reconstruction, and/or weight based adjustment of the mA/kV was utilized to reduce the radiation dose to as low as reasonably achievable.  COMPARISON: Noncontrast CT head from earlier today, CTA head and neck August 8, 2021 HISTORY: ORDERING SYSTEM PROVIDED HISTORY: dizziness TECHNOLOGIST PROVIDED HISTORY: Reason for exam:->dizziness Decision Support Exception - unselect if not a suspected or confirmed emergency medical condition->Emergency Medical Condition (MA) Reason for Exam: dizziness x this am; r/o stroke Additional signs and symptoms: hx stroke; History of breast cancer [Z85.3] 07/20/2016    Essential hypertension [I10] 06/17/2016       ASSESSMENT & PLAN  Generalized Weakness  -  Symptoms are global and nonfocal so a distinct/regional CNS event seems unlikely. However, with her repeated presentations for right ocular pain and headache combined w/ a h/o prior breast cancer, will proceed with MRI brain w/ and w/o contrast primarily to exclude breast cancer recurrence w/ intracranial metastases. Sinus Bradycardia  -  Symptomatic bradycardia seems the more likely cause for patient's general exertional weakness and instability. -  Check TSH and FT4.  -  May require stress testing to evaluate for chronotropic incompetence, but for now will start with simple orthostatic vitals. CAD s/p PCI  -  Most recently DAYANA to [address] 6/8/20.  -  Continue home ASA, brilinta, statin, and lisinopril. She is not prescribed any bruce agents. CKD 3a, HTN  -  Baseline creatinine ~ 1 and essentially at baseline now. DM2 w/ hyperglycemia  -  Hold all oral antidiabetic agents. Start s.c. Insulin regimen based on home regimen. Bipolar D/O  -  Continue home trazodone, prn clonazepam, and paliperidone.     DVT prophylaxis: SCDs, lovenox  Code Status:  Full  Disposition:  Inpatient    Carlito Barnes MD MD

## 2022-02-15 NOTE — PROGRESS NOTES
Patient's EF (Ejection Fraction) is greater than 40%    Heart Failure Medications:   Diuretics[de-identified] None     (One of the following REQUIRED for EF </= 40%/SYSTOLIC FAILURE but MAY be used in EF% >40%/DIASTOLIC FAILURE)        ACE[de-identified] None        ARB[de-identified] None         ARNI[de-identified] None    (Beta Blockers)   NON- Evidenced Based Beta Blocker (for EF% >40%/DIASTOLIC FAILURE): None     Evidenced Based Beta Blocker::(REQUIRED for EF% <40%/SYSTOLIC FAILURE) None  . .................................................................................................................................................. Patient's weights and intake/output reviewed: Yes    Patient's Last Weight: 115 lbs obtained by standing scale. Intake/Output Summary (Last 24 hours) at 2/15/2022 0109  Last data filed at 2/14/2022 1714  Gross per 24 hour   Intake 1000 ml   Output --   Net 1000 ml       Comorbidities Reviewed Yes    Patient has a past medical history of Abnormal brain MRI, Acute bilateral low back pain without sciatica, SHARRON (acute kidney injury) ([address] Arthritis, Bipolar disorder ([address] CAD (coronary artery disease), Cancer ([address] Carotid stenosis, bilateral:<50%:per US 7/2016, Carpal tunnel syndrome, Cervical cancer screening, Coronary artery disease of native artery of native heart with stable angina pectoris ([address] DDD (degenerative disc disease), lumbar, Depression, Depression/anxiety, Depression/anxiety, Diabetes mellitus ([address] Gout, History of mammogram, History of therapeutic radiation, Hyperlipidemia, Hypertension, 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      >>For CHF and Comorbidity documentation on Education Time and Topics, please see Education Tab    Progressive Mobility Assessment:  What is this patient's Current Level of Mobility?: Ambulatory- with Assistance  How was this patient Mobilized today?: Edge of Bed, Up to Chair,  Up to Toilet/Shower and Up in Room, ambulated 10 ft                 With Whom? Nurse                 Level of Difficulty/Assistance: stand by     Pt resting in bed at this time on room air. Pt denies shortness of breath. Pt without lower extremity edema.      Patient and/or Family's stated Goal of Care this Admission: reduce shortness of breath, increase activity tolerance, better understand heart failure and disease management, be more comfortable and reduce lower extremity edema prior to discharge        :

## 2022-02-15 NOTE — PLAN OF CARE
Patient admitted to room 213 from ED. Patient oriented to room, call light, bed rails, phone, lights and bathroom. Patient instructed about the schedule of the day including: vital sign frequency, lab draws, possible tests, frequency of MD and staff rounds, including RN/MD rounding together at bedside, daily weights, and I &O's. Patient instructed about prescribed diet, how to use 8MENU, and television. Bed alarm in place, patient aware of placement and reason. Telemetry box 110 in place, patient aware of placement and reason. Bed locked, in lowest position, side rails up 2/4, call light within reach. Will continue to monitor. 4 Eyes Skin Assessment     The patient is being assess for  Admission    I agree that 2 RN's have performed a thorough Head to Toe Skin Assessment on the patient. ALL assessment sites listed below have been assessed. Areas assessed by both nurses: Umer/  [x]   Head, Face, and Ears   [x]   Shoulders, Back, and Chest  [x]   Arms, Elbows, and Hands   [x]   Coccyx, Sacrum, and Ischum  [x]   Legs, Feet, and Heels        Does the Patient have Skin Breakdown? No         Maco Prevention initiated:  No   Wound Care Orders initiated:  No      [facility] nurse consulted for Pressure Injury (Stage 3,4, Unstageable, DTI, NWPT, and Complex wounds):  No      Nurse 1 eSignature: Electronically signed by Elo Fontaine RN on 2/15/22 at 1:06 AM EST    **SHARE this note so that the co-signing nurse is able to place an eSignature**    Nurse 2 eSignature: Electronically signed by Alfonso Jean RN on 2/15/22 at 2:07 AM EST    Problem: Pain:  Goal: Pain level will decrease  Description: Pain level will decrease  Outcome: Ongoing  Goal: Control of acute pain  Description: Control of acute pain  Outcome: Ongoing  Note: Pt will be satisfied with pain control. Pt uses numeric pain rating scale with reassessments after pain med administration. Will continue to monitor progression throughout shift. Problem: HEMODYNAMIC STATUS  Goal: Patient has stable vital signs and fluid balance  Outcome: Ongoing  Note: BP (!) 157/75   Pulse 52   Temp 97.6 °F (36.4 °C)   Resp 16   Ht 5' 3\" (1.6 m)   Wt 115 lb 6.4 oz (52.3 kg)   SpO2 99%   BMI 20.44 kg/m²

## 2022-02-15 NOTE — FLOWSHEET NOTE
02/15/22 0248   Vital Signs   Orthostatic B/P and Pulse?  Yes   Blood Pressure Lying 160/77   Pulse Lying 56 PER MINUTE   Blood Pressure Sitting 155/82   Pulse Sitting 69 PER MINUTE   Blood Pressure Standing 165/84   Pulse Standing 70 PER MINUTE    Pt complains of HA and Right eye pain 8/10
15-Jul-2018

## 2022-02-15 NOTE — CONSULTS
[facility]  RE: right eye pain, per Dr. Kvng Gilliam called back @ 1110

## 2022-02-16 VITALS
WEIGHT: 113.54 LBS | HEART RATE: 60 BPM | HEIGHT: 63 IN | DIASTOLIC BLOOD PRESSURE: 57 MMHG | RESPIRATION RATE: 18 BRPM | BODY MASS INDEX: 20.12 KG/M2 | SYSTOLIC BLOOD PRESSURE: 104 MMHG | TEMPERATURE: 98.5 F | OXYGEN SATURATION: 97 %

## 2022-02-16 LAB
ANION GAP SERPL CALCULATED.3IONS-SCNC: 11 MMOL/L (ref 3–16)
BASOPHILS ABSOLUTE: 0 K/UL (ref 0–0.2)
BASOPHILS RELATIVE PERCENT: 0.2 %
BUN BLDV-MCNC: 15 MG/DL (ref 7–20)
CALCIUM SERPL-MCNC: 8.6 MG/DL (ref 8.3–10.6)
CHLORIDE BLD-SCNC: 99 MMOL/L (ref 99–110)
CO2: 23 MMOL/L (ref 21–32)
CREAT SERPL-MCNC: 1.3 MG/DL (ref 0.6–1.2)
EOSINOPHILS ABSOLUTE: 0 K/UL (ref 0–0.6)
EOSINOPHILS RELATIVE PERCENT: 1.1 %
GFR AFRICAN AMERICAN: 50
GFR NON-AFRICAN AMERICAN: 41
GLUCOSE BLD-MCNC: 185 MG/DL (ref 70–99)
GLUCOSE BLD-MCNC: 247 MG/DL (ref 70–99)
GLUCOSE BLD-MCNC: 262 MG/DL (ref 70–99)
GLUCOSE BLD-MCNC: 274 MG/DL (ref 70–99)
GLUCOSE BLD-MCNC: 291 MG/DL (ref 70–99)
HCT VFR BLD CALC: 35.2 % (ref 36–48)
HEMOGLOBIN: 11.4 G/DL (ref 12–16)
LYMPHOCYTES ABSOLUTE: 1.5 K/UL (ref 1–5.1)
LYMPHOCYTES RELATIVE PERCENT: 41.4 %
MAGNESIUM: 2 MG/DL (ref 1.8–2.4)
MCH RBC QN AUTO: 28.6 PG (ref 26–34)
MCHC RBC AUTO-ENTMCNC: 32.4 G/DL (ref 31–36)
MCV RBC AUTO: 88.2 FL (ref 80–100)
MONOCYTES ABSOLUTE: 0.3 K/UL (ref 0–1.3)
MONOCYTES RELATIVE PERCENT: 7.6 %
NEUTROPHILS ABSOLUTE: 1.8 K/UL (ref 1.7–7.7)
NEUTROPHILS RELATIVE PERCENT: 49.7 %
PDW BLD-RTO: 12.8 % (ref 12.4–15.4)
PERFORMED ON: ABNORMAL
PLATELET # BLD: 116 K/UL (ref 135–450)
PMV BLD AUTO: 9.8 FL (ref 5–10.5)
POTASSIUM SERPL-SCNC: 5 MMOL/L (ref 3.5–5.1)
RBC # BLD: 3.99 M/UL (ref 4–5.2)
REASON FOR REJECTION: NORMAL
REJECTED TEST: NORMAL
SODIUM BLD-SCNC: 133 MMOL/L (ref 136–145)
WBC # BLD: 3.7 K/UL (ref 4–11)

## 2022-02-16 PROCEDURE — 6370000000 HC RX 637 (ALT 250 FOR IP): Performed by: INTERNAL MEDICINE

## 2022-02-16 PROCEDURE — G0378 HOSPITAL OBSERVATION PER HR: HCPCS

## 2022-02-16 PROCEDURE — 36415 COLL VENOUS BLD VENIPUNCTURE: CPT

## 2022-02-16 PROCEDURE — 85025 COMPLETE CBC W/AUTO DIFF WBC: CPT

## 2022-02-16 PROCEDURE — 84443 ASSAY THYROID STIM HORMONE: CPT

## 2022-02-16 PROCEDURE — 2580000003 HC RX 258: Performed by: INTERNAL MEDICINE

## 2022-02-16 PROCEDURE — 80048 BASIC METABOLIC PNL TOTAL CA: CPT

## 2022-02-16 PROCEDURE — 84439 ASSAY OF FREE THYROXINE: CPT

## 2022-02-16 PROCEDURE — 83735 ASSAY OF MAGNESIUM: CPT

## 2022-02-16 PROCEDURE — 6360000002 HC RX W HCPCS: Performed by: INTERNAL MEDICINE

## 2022-02-16 PROCEDURE — 96372 THER/PROPH/DIAG INJ SC/IM: CPT

## 2022-02-16 RX ORDER — TOBRAMYCIN AND DEXAMETHASONE 3; 1 MG/ML; MG/ML
1 SUSPENSION/ DROPS OPHTHALMIC 4 TIMES DAILY
Qty: 2.5 ML | Refills: 0 | Status: SHIPPED | OUTPATIENT
Start: 2022-02-16 | End: 2022-03-02

## 2022-02-16 RX ADMIN — OXYCODONE AND ACETAMINOPHEN 1 TABLET: 5; 325 TABLET ORAL at 05:26

## 2022-02-16 RX ADMIN — OXYCODONE AND ACETAMINOPHEN 1 TABLET: 5; 325 TABLET ORAL at 00:10

## 2022-02-16 RX ADMIN — PANTOPRAZOLE SODIUM 40 MG: 40 TABLET, DELAYED RELEASE ORAL at 08:38

## 2022-02-16 RX ADMIN — PALIPERIDONE 9 MG: 3 TABLET, EXTENDED RELEASE ORAL at 08:38

## 2022-02-16 RX ADMIN — TOBRAMYCIN AND DEXAMETHASONE 1 DROP: 3; 1 SUSPENSION/ DROPS OPHTHALMIC at 08:38

## 2022-02-16 RX ADMIN — ENOXAPARIN SODIUM 30 MG: 30 INJECTION SUBCUTANEOUS at 08:38

## 2022-02-16 RX ADMIN — INSULIN LISPRO 4 UNITS: 100 INJECTION, SOLUTION INTRAVENOUS; SUBCUTANEOUS at 08:40

## 2022-02-16 RX ADMIN — TOBRAMYCIN AND DEXAMETHASONE 1 DROP: 3; 1 SUSPENSION/ DROPS OPHTHALMIC at 05:26

## 2022-02-16 RX ADMIN — GABAPENTIN 600 MG: 300 CAPSULE ORAL at 08:38

## 2022-02-16 RX ADMIN — SODIUM CHLORIDE, PRESERVATIVE FREE 10 ML: 5 INJECTION INTRAVENOUS at 08:39

## 2022-02-16 RX ADMIN — TICAGRELOR 90 MG: 90 TABLET ORAL at 08:38

## 2022-02-16 RX ADMIN — ASPIRIN 81 MG: 81 TABLET, COATED ORAL at 08:38

## 2022-02-16 ASSESSMENT — PAIN SCALES - GENERAL
PAINLEVEL_OUTOF10: 9
PAINLEVEL_OUTOF10: 7

## 2022-02-16 NOTE — PLAN OF CARE
Problem: OXYGENATION/RESPIRATORY FUNCTION  Goal: Patient will maintain patent airway  2/16/2022 0022 by Sherita Light RN  Outcome: Ongoing     Patient's EF (Ejection Fraction) is greater than 40%    Heart Failure Medications:  Diuretics[de-identified] None    (One of the following REQUIRED for EF </= 40%/SYSTOLIC FAILURE but MAY be used in EF% >40%/DIASTOLIC FAILURE)        ACE[de-identified] Lisinopril        ARB[de-identified] None         ARNI[de-identified] None    (Beta Blockers)  NON- Evidenced Based Beta Blocker (for EF% >40%/DIASTOLIC FAILURE): None    Evidenced Based Beta Blocker::(REQUIRED for EF% <40%/SYSTOLIC FAILURE) None  . .................................................................................................................................................. Patient's weights and intake/output reviewed: Yes    Patient's Last Weight: 113 lbs obtained by standing scale. Difference of 2 lbs less than last documented weight.       Intake/Output Summary (Last 24 hours) at 2/16/2022 0023  Last data filed at 2/15/2022 1956  Gross per 24 hour   Intake 720 ml   Output 0 ml   Net 720 ml       Comorbidities Reviewed Yes    Patient has a past medical history of Abnormal brain MRI, Acute bilateral low back pain without sciatica, SHARRON (acute kidney injury) ([facility] Arthritis, Bipolar disorder ([address] CAD (coronary artery disease), Cancer ([facility] Carotid stenosis, bilateral:<50%:per [place] 7/2016, Carpal tunnel syndrome, Cervical cancer screening, Coronary artery disease of native artery of native heart with stable angina pectoris ([address] DDD (degenerative disc disease), lumbar, Depression, Depression/anxiety, Depression/anxiety, Diabetes mellitus ([facility] Gout, History of mammogram, History of therapeutic radiation, Hyperlipidemia, Hypertension, 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 >>For CHF and Comorbidity documentation on Education Time and Topics, please see Education Tab    Progressive Mobility Assessment:  What is this patient's Current Level of Mobility?: Ambulatory- with Assistance  How was this patient Mobilized today?: Edge of Bed,  Up to Toilet/Shower, and Up in Room, ambulated 0 ft                 With Whom? Nurse, PCA, and Self                 Level of Difficulty/Assistance: 1x Assist     Pt resting in bed at this time on room air. Pt denies shortness of breath. Pt without lower extremity edema.      Patient and/or Family's stated Goal of Care this Admission: reduce shortness of breath, increase activity tolerance, better understand heart failure and disease management, and be more comfortable prior to discharge        :

## 2022-02-16 NOTE — CARE COORDINATION
CASE MANAGEMENT DISCHARGE SUMMARY      Discharge to: home w/no needs  IMM given: (date) n/a    Transportation: private  Confirmed discharge plan with:     Patient: yes, pt will contact family   RN, name: Aristides Hernandez RN    Note: Pt a/o, able to ambulate, has PCP and insurance. Pt has no DCP needs at this time. Should needs arise, please contact DCP.           Gail Snow RN

## 2022-02-16 NOTE — PLAN OF CARE
Problem: Pain:  Goal: Pain level will decrease  Description: Pain level will decrease  2/16/2022 0022 by Hanna Wright RN  Outcome: Ongoing  Pt will be satisfied with pain control. Pt uses numeric pain rating scale with reassessments after pain med administration. Will continue to monitor progression throughout shift.

## 2022-02-16 NOTE — PROGRESS NOTES
PIV removed. Tip intact. Dressing in place. Tele box removed. [facility] notified of pt discharge. Discharge paperwork went over with and given to pt. Verbalizes understanding. Pt lock box emptied. Pt wheeled via wheelchair to private car with all belongings. Pt discharge home in stable condition with friend.

## 2022-02-16 NOTE — ED PROVIDER NOTES
EMERGENCY DEPARTMENT ENCOUNTER      This patient was seen and evaluated by the attending physician. Pt Name: Mera Phoenix  MRN: [id number]  Janicegfjhon 1960  Date of evaluation: 2/14/2022  Provider: SARTHAK Newsome CNP-C  PCP: Britton Guzman  ED Attending: Dr. Maryuri Ritter    History provided by the patient. CHIEF COMPLAINT:     Chief Complaint   Patient presents with    Dizziness     Patient arrives via ems with c/o dizziness and blurred vision since 0900. Patient states she took her percocet at 0900. Patient DM2, Sugars have been running high. HISTORY OF PRESENT ILLNESS:      Mera Phoenix is a 57 y.o. female who presents to [facility] ER with complaints of dizziness, right eye blurry vision since 9:00 this morning. Patient states that she took her Percocet at that exact same time. Patient also has diabetes states that her sugars have been elevated. She denies any tingling or numbness, denies any lateral weakness. She is here for further evaluation. Location:right eye  Clide Kasey  Severity:8  Duration:today  Modifying factors:none noted    Nursing Notes were reviewed     REVIEW OF SYSTEMS:     Review of Systems  All systems, atotal of 10, are reviewed and negative except for those that were just noted in history present illness.         PAST MEDICAL HISTORY:     Past Medical History:   Diagnosis Date    Abnormal brain MRI 7/20/2017    Partially empty sella and minimal chronic small vessel ischemic disease    Acute bilateral low back pain without sciatica 11/2/2016    SHARRON (acute kidney injury) ([facility] [place] [address] 7/5/2017    Arthritis     back    Bipolar disorder ([facility] [place] [address] 10/18/2008    CAD (coronary artery disease)     stent placed 6/8/20    Cancer [facility]) 2015    bilateral breast:s/p lumpectomy/radiation:under care care of breast specialist:Dr. Boone     Carotid stenosis, bilateral:<50%:per US 7/2016 7/15/2016    Carpal tunnel syndrome 10/18/2008    Cervical cancer screening 2014    Nml per pt'.    Coronary artery disease of native artery of native heart with stable angina pectoris ([facility] [address] 6/9/2020    DDD (degenerative disc disease), lumbar 7/18/2018    Depression     under care of pschiatrist:Dr. 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     Scoliosis     Spondylosis of lumbar region without myelopathy or radiculopathy 3/10/2017    Transient cerebral ischemia 07/15/2016    TIA:7/10/16    Unspecified cerebral artery occlusion with cerebral infarction     TIA         SURGICAL HISTORY:      Past Surgical History:   Procedure Laterality Date    BREAST LUMPECTOMY  2015    Bilateral:breast cancer    CARDIAC CATHETERIZATION  06/08/2020    Dr. Chacorta Alarcon), DAYANA to Diag 1    COLONOSCOPY N/A 2/1/2021    COLONOSCOPY DIAGNOSTIC performed by Fabián Starkey MD at [place] [address]  2/3/2021    CT BONE MARROW BIOPSY 2/3/2021 Nathaniel Sommers MD [facility] [place] CT SCAN    HYSTERECTOMY      Benign:no cervical cancer per pt'    [address]      right    OTHER SURGICAL HISTORY Right     orif right ankle    TEMPORAL ARTERY BIOPSY Right 8/9/2021    RIGHT TEMPORAL ARTERY BIOPSY LIGATION [facility] Lombard[facility] [address] N/A 1/29/2021    EGD BIOPSY performed by Fabián Starkey MD at [address]: Current Discharge Medication List      CONTINUE these medications which have NOT CHANGED    Details   glimepiride (AMARYL) 4 MG tablet 4 mg with breakfast or first meal of the day  Qty: 30 tablet, Refills: 5    Associated Diagnoses: Uncontrolled type 2 diabetes mellitus with microalbuminuria, with long-term current use of insulin ([facility] [address] Type 2 diabetes mellitus with other diabetic kidney complication ([facility] Type 2 diabetes mellitus with diabetic nephropathy, with long-term current use of insulin ([facility])      BRILINTA 90 MG TABS tablet TAKE 1 TABLET BY MOUTH TWICE A DAY  Qty: 60 tablet, Refills: 11      VICTOZA 18 MG/3ML SOPN SC injection INJECT 1.8 MG UNDER THE SKIN ONCE DAILY  Qty: 2 pen, Refills: 5      Blood Glucose Monitoring Suppl (TRUE METRIX METER) w/Device KIT Used to  check blood sugar 4 times eyad  Qty: 1 kit, Refills: 1    Associated Diagnoses: Uncontrolled type 2 diabetes mellitus with microalbuminuria, with long-term current use of insulin ([facility])      ondansetron (ZOFRAN ODT) 4 MG disintegrating tablet Take 1 tablet by mouth every 8 hours as needed for Nausea  Qty: 20 tablet, Refills: 0      lisinopril (PRINIVIL;ZESTRIL) 40 MG tablet Take 1 tablet by mouth daily  Qty: 90 tablet, Refills: 1      atorvastatin (LIPITOR) 20 MG tablet TAKE 1 TABLET BY MOUTH EVERY DAY  Qty: 30 tablet, Refills: 5      nitroGLYCERIN (NITROSTAT) 0.4 MG SL tablet up to max of 3 total doses. If no relief after 1 dose, call 911.   Qty: 25 tablet, Refills: 0      insulin lispro, 1 Unit Dial, (HUMALOG KWIKPEN) 100 UNIT/ML SOPN Inject 9 Units into the skin 3 times daily       fluticasone (FLONASE) 50 MCG/ACT nasal spray 1 spray by Each Nostril route daily  Qty: 1 Bottle, Refills: 1      insulin glargine (LANTUS SOLOSTAR) 100 UNIT/ML injection pen Inject 35 Units into the skin nightly  Qty: 1 pen, Refills: 1    Associated Diagnoses: Uncontrolled type 2 diabetes mellitus with microalbuminuria, with long-term current use of insulin ([facility]) TRUE METRIX BLOOD GLUCOSE TEST strip USE AS DIRECTED TO TEST 4 TIMES A DAY  Qty: 200 strip, Refills: 5    Associated Diagnoses: Uncontrolled type 2 diabetes mellitus with microalbuminuria, with long-term current use of insulin ([facility])      gabapentin (NEURONTIN) 600 MG tablet Take 0.5 tablets by mouth 2 times daily for 3 days. Qty: 90 tablet, Refills: 3      paliperidone (INVEGA) 9 MG extended release tablet Take 9 mg by mouth every morning       pantoprazole (PROTONIX) 40 MG tablet Take 40 mg by mouth daily       ferrous sulfate (IRON 325) 325 (65 Fe) MG tablet Take 325 mg by mouth daily (with breakfast)      oxyCODONE-acetaminophen (PERCOCET) 7.5-325 MG per tablet TAKE 1 TABLET BY MOUTH EVERY 6 (SIX) HOURS AS NEEDED FOR UP TO 28 DAYS. clonazePAM (KLONOPIN) 0.5 MG tablet Take 0.5 mg by mouth 3 times daily as needed.       calcium carbonate-vitamin D (CALTRATE) 600-400 MG-UNIT TABS per tab Take 1 tablet by mouth daily       aspirin 81 MG tablet Take 81 mg by mouth daily      traZODone (DESYREL) 100 MG tablet Take 200 mg by mouth nightly                ALLERGIES:    Morphine, Penicillins, Codeine, and Penicillin g    FAMILY HISTORY:       Family History   Problem Relation Age of Onset    Cancer Mother         breast    Cancer Father     Heart Failure Neg Hx     High Cholesterol Neg Hx     Hypertension Neg Hx     Migraines Neg Hx     Rashes/Skin Problems Neg Hx     Seizures Neg Hx     Stroke Neg Hx     Thyroid Disease Neg Hx     Diabetes Neg Hx           SOCIAL HISTORY:       Social History     Socioeconomic History    Marital status:      Spouse name: None    Number of children: 1    Years of education: None    Highest education level: None   Occupational History    Occupation:    Tobacco Use    Smoking status: Former Smoker     Packs/day: 0.50     Years: 20.00     Pack years: 10.00     Types: Cigarettes, Cigars     Quit date: 7/3/2014     Years since quittin.6    Smokeless tobacco: Never Used   Vaping Use    Vaping Use: Never used   Substance and Sexual Activity    Alcohol use: No     Alcohol/week: 0.0 standard drinks    Drug use: No    Sexual activity: None   Other Topics Concern    None   Social History Narrative    None     Social Determinants of Health     Financial Resource Strain:     Difficulty of Paying Living Expenses: Not on file   Food Insecurity:     Worried About Running Out of Food in the Last Year: Not on file    Tabby of Food in the Last Year: Not on file   Transportation Needs:     Lack of Transportation (Medical): Not on file    Lack of Transportation (Non-Medical): Not on file   Physical Activity:     Days of Exercise per Week: Not on file    Minutes of Exercise per Session: Not on file   Stress:     Feeling of Stress : Not on file   Social Connections:     Frequency of Communication with Friends and Family: Not on file    Frequency of Social Gatherings with Friends and Family: Not on file    Attends Rastafarian Services: Not on file    Active Member of [address] [place] [address] or Organizations: Not on file    Attends Club or Organization Meetings: Not on file    Marital Status: Not on file   Intimate Partner Violence:     Fear of Current or Ex-Partner: Not on file    Emotionally Abused: Not on file    Physically Abused: Not on file    Sexually Abused: Not on file   Housing Stability:     Unable to Pay for Housing in the Last Year: Not on file    Number of [place] in the Last Year: Not on file    Unstable Housing in the Last Year: Not on file       SCREENINGS:   [place] Coma Scale  Eye Opening: Spontaneous  Best Verbal Response: Oriented  Best Motor Response: Obeys commands  Alberto Coma Scale Score: 15        PHYSICAL EXAM:       ED Triage Vitals [02/14/22 1446]   BP Temp Temp Source Pulse Resp SpO2 Height Weight   (!) 153/80 98 °F (36.7 °C) Oral 60 18 99 % 5' 3\" (1.6 m) 120 lb (54.4 kg)       Physical Exam    CONSTITUTIONAL: Awake and alert. Cooperative. Well-developed. Well-nourished. Vitals:    02/15/22 0754 02/15/22 1231 02/15/22 1429 02/15/22 1612   BP: 118/69 (!) 144/71  (!) 152/70   Pulse: 78 63  85   Resp: 16 16  16   Temp: 97.7 °F (36.5 °C) 99.3 °F (37.4 °C)  98.1 °F (36.7 °C)   TempSrc: Axillary Oral  Axillary   SpO2: 99% 100%  93%   Weight:       Height:   5' 3\" (1.6 m)      HENT: Normocephalic. Atraumatic. External ears normal, without discharge. TMs clear bilaterally. No nasal discharge. Oropharynx clear, no erythema. Mucous membranes moist.  EYES: Conjunctiva non-injected, no lid abnormalities noted. No scleral icterus. PERRL. EOM's grossly intact. Anterior chambers clear. No temporal tenderness noted  NECK: Supple. Normal ROM. No meningismus. No thyroid tenderness or swelling noted. CARDIOVASCULAR: RRR. No Murmer. PULMONARY/CHEST WALL: Effort normal. No tachypnea. Lungs clear to ausculation. ABDOMEN: Normal BS. Soft. Nondistended. No tenderness to palpate. No guarding. No hernias noted. No splenomegaly. Back: Spine is midline. No ecchymosis. No crepitus on palpation. No obvious subluxation of vertebral column. No saddle anesthesia or evidence of cauda equina. /ANORECTAL: Not assessed  MUSKULOSKELETAL: Normal ROM. No acute deformities. No edema. No tenderness to palpate. SKIN: Warm and dry. NEUROLOGICAL: No weakness noted, no focal neurologic deficits noted. Moving all extremities equally. No facial droop or unilateral tingling or numbness noted. PSYCHIATRIC: Normal affect, normal insight and judgement. Alert andoriented x 3.         DIAGNOSTIC RESULTS:     LABS:    Results for orders placed or performed during the hospital encounter of 02/14/22   CBC auto differential   Result Value Ref Range    WBC 5.4 4.0 - 11.0 K/uL    RBC 3.90 (L) 4.00 - 5.20 M/uL    Hemoglobin 11.1 (L) 12.0 - 16.0 g/dL    Hematocrit 33.9 (L) 36.0 - 48.0 %    MCV 86.9 80.0 - 100.0 fL    MCH 28.4 26.0 - 34.0 pg    MCHC 32.7 31.0 - 36.0 g/dL    RDW 12.4 12.4 - 15.4 %    Platelets 459 821 - 143 K/uL    MPV 9.7 5.0 - 10.5 fL    Neutrophils % 65.6 %    Lymphocytes % 27.8 %    Monocytes % 5.4 %    Eosinophils % 0.7 %    Basophils % 0.5 %    Neutrophils Absolute 3.6 1.7 - 7.7 K/uL    Lymphocytes Absolute 1.5 1.0 - 5.1 K/uL    Monocytes Absolute 0.3 0.0 - 1.3 K/uL    Eosinophils Absolute 0.0 0.0 - 0.6 K/uL    Basophils Absolute 0.0 0.0 - 0.2 K/uL   Comprehensive metabolic panel   Result Value Ref Range    Sodium 136 136 - 145 mmol/L    Potassium 4.4 3.5 - 5.1 mmol/L    Chloride 97 (L) 99 - 110 mmol/L    CO2 27 21 - 32 mmol/L    Anion Gap 12 3 - 16    Glucose 417 (H) 70 - 99 mg/dL    BUN 19 7 - 20 mg/dL    CREATININE 1.1 0.6 - 1.2 mg/dL    GFR Non-African American 50 (A) >60    GFR African American >60 >60    Calcium 9.5 8.3 - 10.6 mg/dL    Total Protein 7.0 6.4 - 8.2 g/dL    Albumin 4.0 3.4 - 5.0 g/dL    Albumin/Globulin Ratio 1.3 1.1 - 2.2    Total Bilirubin <0.2 0.0 - 1.0 mg/dL    Alkaline Phosphatase 215 (H) 40 - 129 U/L    ALT 66 (H) 10 - 40 U/L    AST 31 15 - 37 U/L   Sedimentation Rate   Result Value Ref Range    Sed Rate 49 (H) 0 - 30 mm/Hr   C-reactive protein   Result Value Ref Range    CRP <3.0 0.0 - 5.1 mg/L   Troponin   Result Value Ref Range    Troponin <0.01 <0.01 ng/mL   Blood gas, venous   Result Value Ref Range    pH, Kyle 7.435 7.350 - 7.450    pCO2, Kyle 38.9 (L) 40.0 - 50.0 mmHg    pO2, Kyle 55.2 (H) 25.0 - 40.0 mmHg    HCO3, Venous 25.5 23.0 - 29.0 mmol/L    Base Excess, Kyle 1.3 -3.0 - 3.0 mmol/L    O2 Sat, Kyle 90 Not Established %    Carboxyhemoglobin 4.5 (H) 0.0 - 1.5 %    MetHgb, Kyle 0.3 <1.5 %    TC02 (Calc), Kyle 27 Not Established mmol/L    O2 Therapy Unknown    Sample possible blood bank testing   Result Value Ref Range    Specimen Status KIMMY    Urinalysis, reflex to microscopic   Result Value Ref Range    Color, UA Yellow Straw/Yellow    Clarity, UA Clear Clear    Glucose, Ur >=1000 (A) Negative mg/dL    Bilirubin Urine Negative Negative Ketones, Urine Negative Negative mg/dL    Specific Gravity, UA 1.015 1.005 - 1.030    Blood, Urine TRACE-INTACT (A) Negative    pH, UA 6.5 5.0 - 8.0    Protein, UA 30 (A) Negative mg/dL    Urobilinogen, Urine 0.2 <2.0 E.U./dL    Nitrite, Urine Negative Negative    Leukocyte Esterase, Urine Negative Negative    Microscopic Examination YES     Urine Type NotGiven    Microscopic Urinalysis   Result Value Ref Range    WBC, UA 0-2 0 - 5 /HPF    RBC, UA 3-4 0 - 4 /HPF    Epithelial Cells, UA 11-20 (A) 0 - 5 /HPF    Bacteria, UA Rare (A) None Seen /HPF   Urine Drug Screen   Result Value Ref Range    Amphetamine Screen, Urine Neg Negative <1000ng/mL    Barbiturate Screen, Ur Neg Negative <200 ng/mL    Benzodiazepine Screen, Urine Neg Negative <200 ng/mL    Cannabinoid Scrn, Ur Neg Negative <50 ng/mL    Cocaine Metabolite Screen, Urine Neg Negative <300 ng/mL    Opiate Scrn, Ur Neg Negative <300 ng/mL    PCP Screen, Urine Neg Negative <25 ng/mL    Methadone Screen, Urine Neg Negative <300 ng/mL    Propoxyphene Scrn, Ur Neg Negative <300 ng/mL    Oxycodone Urine POSITIVE (A) Negative <100 ng/ml    pH, UA 6.5     Drug Screen Comment: see below    Hemoglobin A1c   Result Value Ref Range    Hemoglobin A1C 11.5 See comment %    eAG 283.4 mg/dL   POCT Glucose   Result Value Ref Range    POC Glucose 428 (H) 70 - 99 mg/dl    Performed on ACCU-CHEK    POCT Glucose   Result Value Ref Range    POC Glucose 268 (H) 70 - 99 mg/dl    Performed on ACCU-CHEK    POCT Glucose   Result Value Ref Range    POC Glucose 45 (LL) 70 - 99 mg/dl    Performed on ACCU-CHEK    POCT Glucose   Result Value Ref Range    POC Glucose 60 (L) 70 - 99 mg/dl    Performed on ACCU-CHEK    POCT Glucose   Result Value Ref Range    POC Glucose 95 70 - 99 mg/dl    Performed on ACCU-CHEK    POCT Glucose   Result Value Ref Range    POC Glucose 340 (H) 70 - 99 mg/dl    Performed on ACCU-CHEK    POCT Glucose   Result Value Ref Range    POC Glucose 429 (H) 70 - 99 mg/dl Performed on ACCU-CHEK    POCT Glucose   Result Value Ref Range    POC Glucose 41 (LL) 70 - 99 mg/dl    Performed on ACCU-CHEK    POCT Glucose   Result Value Ref Range    POC Glucose 301 (H) 70 - 99 mg/dl    Performed on ACCU-CHEK    POCT Glucose   Result Value Ref Range    POC Glucose 281 (H) 70 - 99 mg/dl    Performed on ACCU-CHEK    POCT Glucose   Result Value Ref Range    POC Glucose 122 (H) 70 - 99 mg/dl    Performed on ACCU-CHEK    EKG 12 Lead   Result Value Ref Range    Ventricular Rate 58 BPM    Atrial Rate 58 BPM    P-R Interval 158 ms    QRS Duration 80 ms    Q-T Interval 448 ms    QTc Calculation (Bazett) 439 ms    P Axis 80 degrees    R Axis -23 degrees    T Axis 58 degrees    Diagnosis       Sinus bradycardiaOtherwise normal ECGWhen compared with ECG of 02-NOV-2021 12:42,No significant change was foundConfirmed by Zita Oconnor MD, [address] ([id number]) on 2/14/2022 6:56:54 PM         RADIOLOGY:  All x-ray studies are viewed/reviewedby me. Formal interpretations per the radiologist are as follows:      MRI BRAIN W WO CONTRAST   Final Result   No acute intracranial abnormality. No mass effect or abnormal intracranial   enhancement to suggest intracranial metastasis. Minimal chronic microvascular disease. CTA HEAD NECK W CONTRAST   Final Result   No acute abnormality or flow-limiting stenosis of the major arteries of the   head and neck. RECOMMENDATIONS:   Unavailable         XR CHEST 1 VIEW   Final Result   No acute cardiopulmonary findings. CT HEAD WO CONTRAST   Final Result   No acute intracranial abnormality. Stable right maxillary sinus opacification.                  EKG:  See EKG interpretation by an attending phsyician      PROCEDURES:   N/A    CRITICAL CARE TIME:   N/A    CONSULTS:  IP CONSULT TO OPHTHALMOLOGY  IP CONSULT TO HOSPITALIST  IP CONSULT TO STROKE TEAM  IP CONSULT TO RHEUMATOLOGY      EMERGENCYDEPARTMENT COURSE and DIFFERENTIAL DIAGNOSIS/MDM:   Vitals:    Vitals: 02/15/22 0754 02/15/22 1231 02/15/22 1429 02/15/22 1612   BP: 118/69 (!) 144/71  (!) 152/70   Pulse: 78 63  85   Resp: 16 16  16   Temp: 97.7 °F (36.5 °C) 99.3 °F (37.4 °C)  98.1 °F (36.7 °C)   TempSrc: Axillary Oral  Axillary   SpO2: 99% 100%  93%   Weight:       Height:   5' 3\" (1.6 m)            Patient was evaluated by both myself and Dr. Quincy Addison. Patient presented to the emergency room today with complaints of right eye blurriness as well as having some dizziness. Patient had no focal neurologic deficit she does have a history of temporal arteritis. I did do initial exam on this patient, she complained of a headache but denies any trauma she is complaining of right eye blurry vision and again has a history of temporal arteritis so I did do ESR as well as other laboratory studies. Patient glucose significantly elevated, slightly elevated ESR as well. Her exam showed no focal neurologic deficits that there was no weakness, no extremities. At this time I did sign the patient out to the attending physician who will do final disposition of this patient I do anticipate admission, see his note for further details. Patient vital signs are stable upon signout. FINAL IMPRESSION:      1. Dizziness    2. Right leg weakness    3. Acute right eye pain          DISPOSITION/PLAN:   DISPOSITIONAdmitted      PATIENT REFERRED TO:  No follow-up provider specified.     DISCHARGE MEDICATIONS:  Current Discharge Medication List                     (Please note that portions of this note were completed with a voice recognition program.  Efforts were made to edit the dictations, but occasionally words are mis-transcribed.)    SARTHAK Buckner CNP-C (electronically signed)        SARTHAK Buckner CNP  02/15/22 [id number]

## 2022-02-16 NOTE — DISCHARGE SUMMARY
Hospital Medicine Discharge Summary    Patient ID: Chito Ibarra      Patient's PCP: Jhoana Arriaga Date: 2/14/2022     Discharge Date: 2/16/2022      Admitting Provider: Eduardo Mayer MD     Discharge Provider: Gudelia Leos MD     Discharge Diagnoses: Active Hospital Problems    Diagnosis     Symptomatic bradycardia [R00.1]     CAD S/P percutaneous coronary angioplasty [I25.10, Z98.61]     Stage 3a chronic kidney disease ([facility] [address] [N18.31]     Uncontrolled type 2 diabetes mellitus with microalbuminuria, with long-term current use of insulin (HCC) [E11.29, E11.65, R80.9, Z79.4]     History of breast cancer [Z85.3]     Essential hypertension [I10]        The patient was seen and examined on day of discharge and this discharge summary is in conjunction with any daily progress note from day of discharge. Hospital Course:   Sherly Stein a 57 y. o. female.   She presented to the ER from home by ambulance. Sara Reid relates she awoke this morning with right eye pain and tearing as well as generalized weakness.  She has felt too weak to walk and her legs were buckling beneath her.     Patient relates she felt completely normal yesterday. Sara Reid has maintained a strong appetite and oral intake.  She denies rigors, sweats, nausea and states even now she does not feel acutely ill, just weak.     Patient was admitted 8/8 - 11/21 for similar reasons including right eye pain.  Similar to that admission she also describes aching/pressure-like pain in the right frontal area.  She describes her vision in the right eye as blurry.  She denies diplopia, magnolia visual loss, and  jaw claudication.   When she was hospitalized in August, 2021 she underwent biopsy of the right temporal artery.  There were no pathologic findings of temporal arteritis at that time.     Patient related she was surprised by her blood glucose which was over 400 when she arrived. Sara Reid states she took her lantus insulin this morning (26 units). Iritis  - ophthalmology consulted  - improving with tobramycin-dexamethasone eye drops. Will continue on discharge  - MRI brain negative  - no concern for temporal arteritis at this time  - follow up with ophthalmology later this week     Bradycardia  - sinus bradycardia on admission. Currently in NSR  - asymptomatic when present  - no further work up needed     Generalized weakness  - likely multifactorial  - has had several admissions in the past year  - supportive care     DMII  - poorly controlled  - resume home regimen on discharge     CAD  - no active chest pain  - continue home ASA, brilinta, statin     HTN  - well controlled  - continue home lisinopril     HLD  - continue home statin     CKD III  - Cr at baseline     Bipolar disorder  - mood stable  - continue home meds    Physical Exam Performed:     BP (!) 104/57   Pulse 60   Temp 98.5 °F (36.9 °C) (Oral)   Resp 18   Ht 5' 3\" (1.6 m)   Wt 113 lb 8.6 oz (51.5 kg)   SpO2 97%   BMI 20.11 kg/m²       General appearance: No apparent distress, appears stated age and cooperative. HEENT: Right eye erythema and tenderness. Left eye conjunctiva clear  Neck: Supple, with full range of motion. No jugular venous distention. Trachea midline. Respiratory:  Normal respiratory effort. Clear to auscultation, bilaterally without Rales/Wheezes/Rhonchi. Cardiovascular: Regular rate and rhythm with normal S1/S2 without murmurs, rubs or gallops. Abdomen: Soft, non-tender, non-distended with normal bowel sounds. Musculoskeletal: No clubbing, cyanosis or edema bilaterally. Full range of motion without deformity. Skin: Skin color, texture, turgor normal.  No rashes or lesions. Neurologic:  Neurovascularly intact without any focal sensory/motor deficits.  Cranial nerves: II-XII intact, grossly non-focal.  Psychiatric: Alert and oriented, thought content appropriate, normal insight  Capillary Refill: Brisk,3 seconds, normal   Peripheral Pulses: +2 palpable, equal bilaterally     Labs: For convenience and continuity at follow-up the following most recent labs are provided:      CBC:    Lab Results   Component Value Date    WBC 3.7 02/16/2022    HGB 11.4 02/16/2022    HCT 35.2 02/16/2022     02/16/2022       Renal:    Lab Results   Component Value Date     02/16/2022    K 5.0 02/16/2022    K 3.8 11/22/2021    CL 99 02/16/2022    CO2 23 02/16/2022    BUN 15 02/16/2022    CREATININE 1.3 02/16/2022    CALCIUM 8.6 02/16/2022    PHOS 2.9 03/06/2021         Significant Diagnostic Studies    Radiology:   MRI BRAIN W WO CONTRAST   Final Result   No acute intracranial abnormality. No mass effect or abnormal intracranial   enhancement to suggest intracranial metastasis. Minimal chronic microvascular disease. CTA HEAD NECK W CONTRAST   Final Result   No acute abnormality or flow-limiting stenosis of the major arteries of the   head and neck. RECOMMENDATIONS:   Unavailable         XR CHEST 1 VIEW   Final Result   No acute cardiopulmonary findings. CT HEAD WO CONTRAST   Final Result   No acute intracranial abnormality. Stable right maxillary sinus opacification.                 Consults:     IP CONSULT TO OPHTHALMOLOGY  IP CONSULT TO HOSPITALIST  IP CONSULT TO STROKE TEAM  IP CONSULT TO RHEUMATOLOGY    Disposition:  home     Condition at Discharge: Stable    Discharge Instructions/Follow-up:  Follow up with PCP, ophthalmology in 1-2 weeks    Code Status:  Full code    Activity: activity as tolerated    Diet: regular diet      Discharge Medications:     Discharge Medication List as of 2/16/2022 10:18 AM           Details   tobramycin-dexamethasone (TOBRADEX) 0.3-0.1 % ophthalmic suspension Place 1 drop into the right eye 4 times daily for 14 days, Disp-2.5 mL, R-0Normal              Details   glimepiride (AMARYL) 4 MG tablet 4 mg with breakfast or first meal of the day, Disp-30 tablet, R-5Normal      BRILINTA 90 MG TABS tablet TAKE 1 TABLET BY MOUTH TWICE A DAY, Disp-60 tablet, R-11Normal      VICTOZA 18 MG/3ML SOPN SC injection INJECT 1.8 MG UNDER THE SKIN ONCE DAILY, Disp-2 pen, R-5Normal      Blood Glucose Monitoring Suppl (TRUE METRIX METER) w/Device KIT Used to  check blood sugar 4 times eyad, Disp-1 kit, R-1Normal      ondansetron (ZOFRAN ODT) 4 MG disintegrating tablet Take 1 tablet by mouth every 8 hours as needed for Nausea, Disp-20 tablet, R-0Normal      lisinopril (PRINIVIL;ZESTRIL) 40 MG tablet Take 1 tablet by mouth daily, Disp-90 tablet, R-1Normal      atorvastatin (LIPITOR) 20 MG tablet TAKE 1 TABLET BY MOUTH EVERY DAY, Disp-30 tablet, R-5Normal      nitroGLYCERIN (NITROSTAT) 0.4 MG SL tablet up to max of 3 total doses. If no relief after 1 dose, call 911., Disp-25 tablet, R-0Normal      insulin lispro, 1 Unit Dial, (HUMALOG KWIKPEN) 100 UNIT/ML SOPN Inject 9 Units into the skin 3 times daily Historical Med      fluticasone (FLONASE) 50 MCG/ACT nasal spray 1 spray by Each Nostril route daily, Disp-1 Bottle, R-1Normal      insulin glargine (LANTUS SOLOSTAR) 100 UNIT/ML injection pen Inject 35 Units into the skin nightly, Disp-1 pen, R-1Normal      TRUE METRIX BLOOD GLUCOSE TEST strip USE AS DIRECTED TO TEST 4 TIMES A DAY, Disp-200 strip, R-5Normal      gabapentin (NEURONTIN) 600 MG tablet Take 0.5 tablets by mouth 2 times daily for 3 days. , Disp-90 tablet, R-3NO PRINT      paliperidone (INVEGA) 9 MG extended release tablet Take 9 mg by mouth every morning Historical Med      pantoprazole (PROTONIX) 40 MG tablet Take 40 mg by mouth daily Historical Med      ferrous sulfate (IRON 325) 325 (65 Fe) MG tablet Take 325 mg by mouth daily (with breakfast)Historical Med      oxyCODONE-acetaminophen (PERCOCET) 7.5-325 MG per tablet TAKE 1 TABLET BY MOUTH EVERY 6 (SIX) HOURS AS NEEDED FOR UP TO 28 DAYS. Historical Med      clonazePAM (KLONOPIN) 0.5 MG tablet Take 0.5 mg by mouth 3 times daily as needed. Historical Med      calcium carbonate-vitamin D (CALTRATE) 600-400 MG-UNIT TABS per tab Take 1 tablet by mouth daily Historical Med      aspirin 81 MG tablet Take 81 mg by mouth dailyHistorical Med      traZODone (DESYREL) 100 MG tablet Take 200 mg by mouth nightly Historical Med             Time Spent on discharge is more than 30 minutes in the examination, evaluation, counseling and review of medications and discharge plan. Signed:    Andrew Estrada MD   2/16/2022      Thank you Ayaka Priest for the opportunity to be involved in this patient's care. If you have any questions or concerns please feel free to contact me at [phone].

## 2022-02-16 NOTE — ACP (ADVANCE CARE PLANNING)
Advance Care Planning     General Advance Care Planning (ACP) Conversation    Date of Conversation: 2/14/2022  Conducted with: Patient with Decision Making Capacity    Healthcare Decision Maker:    Secondary Decision Maker (Active): Isaurorich Callejas - [phone]    Secondary Decision Maker: Tabitha Jarretyudith - Other - [phone]    Secondary Decision Maker: Kati Lilliam - Other - [phone]    Supplemental (Other) Decision Maker: Chintan Whatley - Other - [phone]    Supplemental (Other) Decision Maker: Sylvia Rodriguez - Other - [phone]  Click here to complete Healthcare Decision Makers including selection of the Healthcare Decision Maker Relationship (ie \"Primary\"). Today we documented Decision Maker(s) consistent with Legal Next of Kin hierarchy.     Content/Action Overview:  Has NO ACP documents/care preferences - information provided, considering goals and options  Reviewed DNR/DNI and patient elects Full Code (Attempt Resuscitation)        Length of Voluntary ACP Conversation in minutes:  <16 minutes (Non-Billable)    Mary Jane Klein RN

## 2022-02-16 NOTE — DISCHARGE INSTR - DIET

## 2022-02-17 LAB
T4 FREE: 1.4 NG/DL (ref 0.9–1.8)
TSH SERPL DL<=0.05 MIU/L-ACNC: 0.75 UIU/ML (ref 0.27–4.2)

## 2022-03-12 DIAGNOSIS — E11.29 TYPE 2 DIABETES MELLITUS WITH OTHER DIABETIC KIDNEY COMPLICATION (HCC): ICD-10-CM

## 2022-03-12 DIAGNOSIS — E11.21 TYPE 2 DIABETES MELLITUS WITH DIABETIC NEPHROPATHY, WITH LONG-TERM CURRENT USE OF INSULIN (HCC): ICD-10-CM

## 2022-03-12 DIAGNOSIS — Z79.4 TYPE 2 DIABETES MELLITUS WITH DIABETIC NEPHROPATHY, WITH LONG-TERM CURRENT USE OF INSULIN (HCC): ICD-10-CM

## 2022-03-14 RX ORDER — GLIMEPIRIDE 2 MG/1
TABLET ORAL
Qty: 30 TABLET | Refills: 5 | OUTPATIENT
Start: 2022-03-14

## 2022-03-28 PROCEDURE — 3046F HEMOGLOBIN A1C LEVEL >9.0%: CPT | Performed by: INTERNAL MEDICINE

## 2022-03-28 NOTE — TELEPHONE ENCOUNTER
Called  Jongsarathyesi Torres for size of pen needles she is using. She is using BS pen needles size 32 G x 4 mm     Mrs. Fried informed refill sent

## 2022-03-28 NOTE — TELEPHONE ENCOUNTER
Pt called stating she needs a refill of pen needles. I could not understand the brand she needed. Pt accidently disconnected call. Will await for pt to call back for brand of needle.

## 2022-03-31 ENCOUNTER — HOSPITAL ENCOUNTER (EMERGENCY)
Age: 62
Discharge: HOME | End: 2022-03-31
Payer: COMMERCIAL

## 2022-03-31 ENCOUNTER — APPOINTMENT (OUTPATIENT)
Dept: GENERAL RADIOLOGY | Age: 62
End: 2022-03-31
Payer: COMMERCIAL

## 2022-03-31 VITALS
DIASTOLIC BLOOD PRESSURE: 77 MMHG | HEART RATE: 54 BPM | SYSTOLIC BLOOD PRESSURE: 172 MMHG | WEIGHT: 119 LBS | OXYGEN SATURATION: 100 % | TEMPERATURE: 97.6 F | BODY MASS INDEX: 21.08 KG/M2 | RESPIRATION RATE: 16 BRPM

## 2022-03-31 DIAGNOSIS — M25.561 ACUTE PAIN OF RIGHT KNEE: ICD-10-CM

## 2022-03-31 DIAGNOSIS — R73.9 HYPERGLYCEMIA: Primary | ICD-10-CM

## 2022-03-31 LAB
A/G RATIO: 1.1 (ref 1.1–2.2)
ALBUMIN SERPL-MCNC: 3.9 G/DL (ref 3.4–5)
ALP BLD-CCNC: 534 U/L (ref 40–129)
ALT SERPL-CCNC: 92 U/L (ref 10–40)
ANION GAP SERPL CALCULATED.3IONS-SCNC: 9 MMOL/L (ref 3–16)
AST SERPL-CCNC: 36 U/L (ref 15–37)
BASE EXCESS VENOUS: 2.8 MMOL/L (ref -3–3)
BASOPHILS ABSOLUTE: 0 K/UL (ref 0–0.2)
BASOPHILS RELATIVE PERCENT: 0.3 %
BETA-HYDROXYBUTYRATE: 0.44 MMOL/L (ref 0–0.27)
BILIRUB SERPL-MCNC: 0.4 MG/DL (ref 0–1)
BILIRUBIN URINE: NEGATIVE
BLOOD, URINE: NEGATIVE
BUN BLDV-MCNC: 21 MG/DL (ref 7–20)
CALCIUM SERPL-MCNC: 9.5 MG/DL (ref 8.3–10.6)
CARBOXYHEMOGLOBIN: 1.5 % (ref 0–1.5)
CHLORIDE BLD-SCNC: 92 MMOL/L (ref 99–110)
CLARITY: CLEAR
CO2: 28 MMOL/L (ref 21–32)
COLOR: YELLOW
CREAT SERPL-MCNC: 1.6 MG/DL (ref 0.6–1.2)
EOSINOPHILS ABSOLUTE: 0 K/UL (ref 0–0.6)
EOSINOPHILS RELATIVE PERCENT: 0.4 %
GFR AFRICAN AMERICAN: 39
GFR NON-AFRICAN AMERICAN: 33
GLUCOSE BLD-MCNC: 356 MG/DL (ref 70–99)
GLUCOSE BLD-MCNC: 446 MG/DL (ref 70–99)
GLUCOSE BLD-MCNC: 668 MG/DL (ref 70–99)
GLUCOSE BLD-MCNC: >600 MG/DL (ref 70–99)
GLUCOSE BLD-MCNC: >600 MG/DL (ref 70–99)
GLUCOSE URINE: >=1000 MG/DL
HCO3 VENOUS: 29.2 MMOL/L (ref 23–29)
HCT VFR BLD CALC: 31.5 % (ref 36–48)
HEMOGLOBIN: 10.1 G/DL (ref 12–16)
KETONES, URINE: NEGATIVE MG/DL
LEUKOCYTE ESTERASE, URINE: NEGATIVE
LYMPHOCYTES ABSOLUTE: 1.5 K/UL (ref 1–5.1)
LYMPHOCYTES RELATIVE PERCENT: 28 %
MAGNESIUM: 2.1 MG/DL (ref 1.8–2.4)
MCH RBC QN AUTO: 28 PG (ref 26–34)
MCHC RBC AUTO-ENTMCNC: 32.1 G/DL (ref 31–36)
MCV RBC AUTO: 87.3 FL (ref 80–100)
METHEMOGLOBIN VENOUS: 0.5 %
MICROSCOPIC EXAMINATION: ABNORMAL
MONOCYTES ABSOLUTE: 0.3 K/UL (ref 0–1.3)
MONOCYTES RELATIVE PERCENT: 6.1 %
NEUTROPHILS ABSOLUTE: 3.5 K/UL (ref 1.7–7.7)
NEUTROPHILS RELATIVE PERCENT: 65.2 %
NITRITE, URINE: NEGATIVE
O2 SAT, VEN: 68 %
O2 THERAPY: ABNORMAL
PCO2, VEN: 53.5 MMHG (ref 40–50)
PDW BLD-RTO: 12.9 % (ref 12.4–15.4)
PERFORMED ON: ABNORMAL
PH UA: 6 (ref 5–8)
PH VENOUS: 7.36 (ref 7.35–7.45)
PHOSPHORUS: 3.8 MG/DL (ref 2.5–4.9)
PLATELET # BLD: 176 K/UL (ref 135–450)
PMV BLD AUTO: 10.1 FL (ref 5–10.5)
PO2, VEN: 35.9 MMHG (ref 25–40)
POTASSIUM REFLEX MAGNESIUM: 5 MMOL/L (ref 3.5–5.1)
PROTEIN UA: NEGATIVE MG/DL
RBC # BLD: 3.61 M/UL (ref 4–5.2)
RBC UA: ABNORMAL /HPF (ref 0–4)
RENAL EPITHELIAL, UA: ABNORMAL /HPF (ref 0–1)
SODIUM BLD-SCNC: 129 MMOL/L (ref 136–145)
SPECIFIC GRAVITY UA: <=1.005 (ref 1–1.03)
TCO2 CALC VENOUS: 31 MMOL/L
TOTAL PROTEIN: 7.6 G/DL (ref 6.4–8.2)
URINE TYPE: ABNORMAL
UROBILINOGEN, URINE: 0.2 E.U./DL
WBC # BLD: 5.4 K/UL (ref 4–11)
WBC UA: ABNORMAL /HPF (ref 0–5)

## 2022-03-31 PROCEDURE — 73562 X-RAY EXAM OF KNEE 3: CPT

## 2022-03-31 PROCEDURE — 99285 EMERGENCY DEPT VISIT HI MDM: CPT

## 2022-03-31 PROCEDURE — 96372 THER/PROPH/DIAG INJ SC/IM: CPT

## 2022-03-31 PROCEDURE — 71045 X-RAY EXAM CHEST 1 VIEW: CPT

## 2022-03-31 PROCEDURE — 2580000003 HC RX 258: Performed by: PHYSICIAN ASSISTANT

## 2022-03-31 PROCEDURE — 82010 KETONE BODYS QUAN: CPT

## 2022-03-31 PROCEDURE — 84100 ASSAY OF PHOSPHORUS: CPT

## 2022-03-31 PROCEDURE — 6370000000 HC RX 637 (ALT 250 FOR IP): Performed by: PHYSICIAN ASSISTANT

## 2022-03-31 PROCEDURE — 93005 ELECTROCARDIOGRAM TRACING: CPT | Performed by: PHYSICIAN ASSISTANT

## 2022-03-31 PROCEDURE — 80053 COMPREHEN METABOLIC PANEL: CPT

## 2022-03-31 PROCEDURE — 82803 BLOOD GASES ANY COMBINATION: CPT

## 2022-03-31 PROCEDURE — 85025 COMPLETE CBC W/AUTO DIFF WBC: CPT

## 2022-03-31 PROCEDURE — 81001 URINALYSIS AUTO W/SCOPE: CPT

## 2022-03-31 PROCEDURE — 83735 ASSAY OF MAGNESIUM: CPT

## 2022-03-31 RX ORDER — ACETAMINOPHEN 325 MG/1
650 TABLET ORAL ONCE
Status: COMPLETED | OUTPATIENT
Start: 2022-03-31 | End: 2022-03-31

## 2022-03-31 RX ORDER — 0.9 % SODIUM CHLORIDE 0.9 %
1000 INTRAVENOUS SOLUTION INTRAVENOUS ONCE
Status: COMPLETED | OUTPATIENT
Start: 2022-03-31 | End: 2022-03-31

## 2022-03-31 RX ADMIN — INSULIN HUMAN 10 UNITS: 100 INJECTION, SOLUTION PARENTERAL at 14:37

## 2022-03-31 RX ADMIN — SODIUM CHLORIDE 1000 ML: 9 INJECTION, SOLUTION INTRAVENOUS at 13:26

## 2022-03-31 RX ADMIN — ACETAMINOPHEN 650 MG: 325 TABLET ORAL at 16:40

## 2022-03-31 ASSESSMENT — PAIN DESCRIPTION - PAIN TYPE
TYPE: ACUTE PAIN
TYPE: ACUTE PAIN

## 2022-03-31 ASSESSMENT — PAIN - FUNCTIONAL ASSESSMENT: PAIN_FUNCTIONAL_ASSESSMENT: 0-10

## 2022-03-31 ASSESSMENT — PAIN DESCRIPTION - LOCATION: LOCATION: KNEE

## 2022-03-31 ASSESSMENT — PAIN SCALES - GENERAL
PAINLEVEL_OUTOF10: 7
PAINLEVEL_OUTOF10: 7
PAINLEVEL_OUTOF10: 8

## 2022-03-31 ASSESSMENT — PAIN DESCRIPTION - ORIENTATION: ORIENTATION: RIGHT

## 2022-03-31 NOTE — ED NOTES
Pt continues resting in room at this time.      Sari Carranza RN  03/31/22 [facility]

## 2022-03-31 NOTE — ED NOTES
Patient identified as a positive fall risk on the ED triage fall screening. Patient placed in fall precautions which includes:  yellow fall risk bracelet on wrist and yellow socks on feet. Patient instructed on importance of not getting out of bed or ambulating without assistance for safety. Pt verbalized understanding.        Caro Taylor RN  03/31/22 [id number]

## 2022-03-31 NOTE — ED NOTES
Pt continues resting in bed at this time. Pt verbalizes a pain of 7/10. States her pain is usually at 10/10. Call light remains in reach, side rails up x2.      Khushi Mayfield RN  03/31/22 [id number]

## 2022-03-31 NOTE — ED PROVIDER NOTES
[facility]  ED      CHIEF COMPLAINT  Knee Pain (fell during the night while attempting to go to the bathroom, c/o right knee pain. thinks might have gotten weak from low blood sugar, normally ambulatory w/o assist.)      SHARED SERVICE VISIT  Evaluated by BETTY. My supervising physician was available for consultation. HISTORY OF PRESENT ILLNESS  Brendan Davenport is a 60 y.o. female history uncontrolled DM2, CKD, frequent falls, CVA, CHF, presents to the ED for evaluation of a fall with right knee pain. Patient states that she was attempting to go to the restroom when she fell. Denies injury to the head neck or loss conscious. Denies syncope. Denies chest pain or shortness of breath prior to the fall. No nausea or vomiting. Patient states that she did not take her insulin this morning. No other complaints, modifying factors or associated symptoms. Nursing notes reviewed. Past Medical History:   Diagnosis Date    Abnormal brain MRI 7/20/2017    Partially empty sella and minimal chronic small vessel ischemic disease    Acute bilateral low back pain without sciatica 11/2/2016    SHARRON (acute kidney injury) ([address] 7/5/2017    Arthritis     back    Bipolar disorder ([address] 10/18/2008    CAD (coronary artery disease)     stent placed 6/8/20    Cancer [facility]) 2015    bilateral breast:s/p lumpectomy/radiation:under care care of breast specialist:Dr. Boone     Carotid stenosis, bilateral:<50%:per US 7/2016 7/15/2016    Carpal tunnel syndrome 10/18/2008    Cervical cancer screening 2014    Nml per pt'.     Coronary artery disease of native artery of native heart with stable angina pectoris ([address] 6/9/2020    DDD (degenerative disc disease), lumbar 7/18/2018    Depression     under care of pschiatrist:Dr. 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     Scoliosis     Spondylosis of lumbar region without myelopathy or radiculopathy 3/10/2017    Transient cerebral ischemia 07/15/2016    TIA:7/10/16    Unspecified cerebral artery occlusion with cerebral infarction     TIA     Past Surgical History:   Procedure Laterality Date    BREAST LUMPECTOMY  2015    Bilateral:breast cancer    CARDIAC CATHETERIZATION  06/08/2020    Dr. Jordon Jorge), DAYANA to Diag 1    COLONOSCOPY N/A 2/1/2021    COLONOSCOPY DIAGNOSTIC performed by Alvin Mccall MD at [address] BONE MARROW BIOPSY  2/3/2021    CT BONE MARROW BIOPSY 2/3/2021 Barrie Espana MD [facility] CT SCAN    HYSTERECTOMY      Benign:no cervical cancer per pt'    KIDNEY REMOVAL      right    OTHER SURGICAL HISTORY Right     orif right ankle    TEMPORAL ARTERY BIOPSY Right 8/9/2021    RIGHT TEMPORAL ARTERY BIOPSY LIGATION performed by Aaliyah Juan [facility] at [facility] ENDOSCOPY N/A 1/29/2021    EGD BIOPSY performed by Alvin Mccall MD at [address]     Family History   Problem Relation Age of Onset    Cancer Mother         breast    Cancer Father     Heart Failure Neg Hx     High Cholesterol Neg Hx     Hypertension Neg Hx     Migraines Neg Hx     Rashes/Skin Problems Neg Hx     Seizures Neg Hx     Stroke Neg Hx     Thyroid Disease Neg Hx     Diabetes Neg Hx      Social History     Socioeconomic History    Marital status:      Spouse name: Not on file    Number of children: 1  Years of education: Not on file    Highest education level: Not on file   Occupational History    Occupation:    Tobacco Use    Smoking status: Former Smoker     Packs/day: 0.50     Years: 20.00     Pack years: 10.00     Types: Cigarettes, Cigars     Quit date: 7/3/2014     Years since quittin.7    Smokeless tobacco: Never Used   Vaping Use    Vaping Use: Never used   Substance and Sexual Activity    Alcohol use: No     Alcohol/week: 0.0 standard drinks    Drug use: No    Sexual activity: Not on file   Other Topics Concern    Not on file   Social History Narrative    Not on file     Social Determinants of Health     Financial Resource Strain:     Difficulty of Paying Living Expenses: Not on file   Food Insecurity:     Worried About [address] [employer] in the Last Year: Not on file    [address] [place] in the Last Year: Not on file   Transportation Needs:     Lack of Transportation (Medical): Not on file    Lack of Transportation (Non-Medical):  Not on file   Physical Activity:     Days of Exercise per Week: Not on file    Minutes of Exercise per Session: Not on file   Stress:     Feeling of Stress : Not on file   Social Connections:     Frequency of Communication with Friends and Family: Not on file    Frequency of Social Gatherings with Friends and Family: Not on file    Attends Gnosticist Services: Not on file    Active Member of [address] [place] [address] or Organizations: Not on file    Attends Club or Organization Meetings: Not on file    Marital Status: Not on file   Intimate Partner Violence:     Fear of Current or Ex-Partner: Not on file    Emotionally Abused: Not on file    Physically Abused: Not on file    Sexually Abused: Not on file   Housing Stability:     Unable to Pay for Housing in the Last Year: Not on file    Number of [place] in the Last Year: Not on file    Unstable Housing in the Last Year: Not on file     No current facility-administered medications for this encounter. Current Outpatient Medications   Medication Sig Dispense Refill    Insulin Pen Needle 32G X 4 MM MISC 1 each by Does not apply route daily 100 each 3    glimepiride (AMARYL) 4 MG tablet 4 mg with breakfast or first meal of the day 30 tablet 5    BRILINTA 90 MG TABS tablet TAKE 1 TABLET BY MOUTH TWICE A DAY 60 tablet 11    VICTOZA 18 MG/3ML SOPN SC injection INJECT 1.8 MG UNDER THE SKIN ONCE DAILY 2 pen 5    Blood Glucose Monitoring Suppl (TRUE METRIX METER) w/Device KIT Used to  check blood sugar 4 times eyad (Patient taking differently: Used to  check blood sugar 3 times eyad) 1 kit 1    ondansetron (ZOFRAN ODT) 4 MG disintegrating tablet Take 1 tablet by mouth every 8 hours as needed for Nausea 20 tablet 0    lisinopril (PRINIVIL;ZESTRIL) 40 MG tablet Take 1 tablet by mouth daily 90 tablet 1    atorvastatin (LIPITOR) 20 MG tablet TAKE 1 TABLET BY MOUTH EVERY DAY 30 tablet 5    nitroGLYCERIN (NITROSTAT) 0.4 MG SL tablet up to max of 3 total doses. If no relief after 1 dose, call 911. 25 tablet 0    insulin lispro, 1 Unit Dial, (HUMALOG KWIKPEN) 100 UNIT/ML SOPN Inject 9 Units into the skin 3 times daily       fluticasone (FLONASE) 50 MCG/ACT nasal spray 1 spray by Each Nostril route daily 1 Bottle 1    insulin glargine (LANTUS SOLOSTAR) 100 UNIT/ML injection pen Inject 35 Units into the skin nightly (Patient taking differently: Inject 26 Units into the skin every morning ) 1 pen 1    TRUE METRIX BLOOD GLUCOSE TEST strip USE AS DIRECTED TO TEST 4 TIMES A  strip 5    gabapentin (NEURONTIN) 600 MG tablet Take 0.5 tablets by mouth 2 times daily for 3 days.  (Patient taking differently: Take 600 mg by mouth 3 times daily. ) 90 tablet 3    paliperidone (INVEGA) 9 MG extended release tablet Take 9 mg by mouth every morning       pantoprazole (PROTONIX) 40 MG tablet Take 40 mg by mouth daily       ferrous sulfate (IRON 325) 325 (65 Fe) MG tablet Take 325 mg by mouth daily (with breakfast)      oxyCODONE-acetaminophen (PERCOCET) 7.5-325 MG per tablet TAKE 1 TABLET BY MOUTH EVERY 6 (SIX) HOURS AS NEEDED FOR UP TO 28 DAYS.  clonazePAM (KLONOPIN) 0.5 MG tablet Take 0.5 mg by mouth 3 times daily as needed.  calcium carbonate-vitamin D (CALTRATE) 600-400 MG-UNIT TABS per tab Take 1 tablet by mouth daily       aspirin 81 MG tablet Take 81 mg by mouth daily      traZODone (DESYREL) 100 MG tablet Take 200 mg by mouth nightly        Allergies   Allergen Reactions    Morphine Anaphylaxis and Hives     feels like throat is closing    Penicillins Hives and Swelling    Codeine Hives and Rash    Penicillin G Rash       REVIEW OF SYSTEMS  10 systems reviewed, pertinent positives per HPI otherwise noted to be negative    PHYSICAL EXAM  BP (!) 172/77   Pulse 54   Temp 97.6 °F (36.4 °C) (Oral)   Resp 16   Wt 119 lb (54 kg)   SpO2 100%   BMI 21.08 kg/m²   GENERAL APPEARANCE: Awake and alert. Cooperative. HEAD: Normocephalic. Atraumatic. No brown signs. No periorbital ecchymosis. EYES: EOM's grossly intact. ENT: Mucous membranes are moist.   NECK: Supple. HEART: RRR. No murmurs. LUNGS: Respirations unlabored. CTAB. Good air exchange. Speaking comfortably in full sentences. ABDOMEN: Soft. Non-distended. Non-tender. No guarding or rebound. No masses. No organomegaly. EXTREMITIES: No peripheral edema. Moves all extremities equally. All extremities neurovascularly intact. Pain with flexion extension of the right knee. SKIN: Warm and dry. No acute rashes. No contusions abrasions lacerations. NEUROLOGICAL: Alert and oriented. CN's 2-12 intact. No gross facial drooping. Strength 5/5, sensation intact. PSYCHIATRIC: Normal mood and affect. RADIOLOGY  XR CHEST PORTABLE   Final Result   No acute process. XR KNEE RIGHT (3 VIEWS)   Final Result   No acute abnormality of the knee.              LABS  Labs Reviewed   URINALYSIS WITH MICROSCOPIC - Abnormal; Notable for the following components:       Result Value    Glucose, Ur >=1000 (*)     All other components within normal limits   CBC WITH AUTO DIFFERENTIAL - Abnormal; Notable for the following components:    RBC 3.61 (*)     Hemoglobin 10.1 (*)     Hematocrit 31.5 (*)     All other components within normal limits   COMPREHENSIVE METABOLIC PANEL W/ REFLEX TO MG FOR LOW K - Abnormal; Notable for the following components:    Sodium 129 (*)     Chloride 92 (*)     Glucose 668 (*)     BUN 21 (*)     CREATININE 1.6 (*)     GFR Non- 33 (*)     GFR African American 39 (*)     Alkaline Phosphatase 534 (*)     ALT 92 (*)     All other components within normal limits    Narrative:     CALL  Patel  [facility] tel. [phone],  Chemistry results called to and read back by Harpreet Vega RN, 03/31/2022  14:22, by LORRAINE   BETA-HYDROXYBUTYRATE - Abnormal; Notable for the following components:    Beta-Hydroxybutyrate 0.44 (*)     All other components within normal limits   BLOOD GAS, VENOUS - Abnormal; Notable for the following components:    pCO2, Kyle 53.5 (*)     HCO3, Venous 29.2 (*)     All other components within normal limits   POCT GLUCOSE - Abnormal; Notable for the following components:    POC Glucose >600 (*)     All other components within normal limits   POCT GLUCOSE - Abnormal; Notable for the following components:    POC Glucose >600 (*)     All other components within normal limits   POCT GLUCOSE - Abnormal; Notable for the following components:    POC Glucose 446 (*)     All other components within normal limits   POCT GLUCOSE - Abnormal; Notable for the following components:    POC Glucose 356 (*)     All other components within normal limits   MAGNESIUM    Narrative:     Tal Montemayor tel. [phone],  Chemistry results called to and read back by Harpreet Vega RN, 03/31/2022  14:22, by [facility]   PHOSPHORUS    Narrative:     Tal Montemayor tel. [phone],  Chemistry results called to and read back by Harpreet Vega RN, 03/31/2022  14:22, by [id number]  Unless otherwise noted below, none  Procedures    CRITICAL CARE TIME  The total critical care time spent while evaluating and treating this patient was 40 minutes. This excludes time spent doing separately billable procedures. This includes time at the bedside, data interpretation, medication management, obtaining critical history from collateral sources if the patient is unable to provide it directly, and physician consultation. Specifics of interventions taken and potentially life-threatening diagnostic considerations are listed above in the medical decision making. MDM  MDM  Patient is a 48-year-old female with history of poorly controlled diabetes presented emergency department for evaluation of a fall with right knee pain that occurred last night while she was tending of the restroom. Patient describes a more mechanical and nonsyncopal fall. On arrival to ED point care glucose was checked which is greater than 600. Will obtain lab work to assess for DKA. Will obtain imaging of the right knee to evaluate for any bony abnormalities. His lab work demonstrated euglycemia without evidence of DKA. No acidemia on ABG and no ketones in urine. Patient was given IV fluids as well as insulin and was able to reduce her glucose to 350. Patient to follow-up with her primary care provider in regards to her elevated blood glucose. Plans to contact them upon discharge. She was provided contact information for orthopedics as well to follow-up for her knee. No bony abnormality was appreciated on x-ray. DISPOSITION  Patient was discharged to home in good condition. CLINICAL IMPRESSION  1. Hyperglycemia    2.  Acute pain of right knee            Lady Nicolette PA-C  03/31/22 [id number]

## 2022-04-01 LAB
EKG ATRIAL RATE: 54 BPM
EKG DIAGNOSIS: NORMAL
EKG P AXIS: 66 DEGREES
EKG P-R INTERVAL: 202 MS
EKG Q-T INTERVAL: 444 MS
EKG QRS DURATION: 76 MS
EKG QTC CALCULATION (BAZETT): 421 MS
EKG R AXIS: -15 DEGREES
EKG T AXIS: 60 DEGREES
EKG VENTRICULAR RATE: 54 BPM

## 2022-04-01 PROCEDURE — 93010 ELECTROCARDIOGRAM REPORT: CPT | Performed by: INTERNAL MEDICINE

## 2022-04-17 DIAGNOSIS — Z79.4 TYPE 2 DIABETES MELLITUS WITH DIABETIC NEPHROPATHY, WITH LONG-TERM CURRENT USE OF INSULIN (HCC): ICD-10-CM

## 2022-04-17 DIAGNOSIS — E11.29 TYPE 2 DIABETES MELLITUS WITH OTHER DIABETIC KIDNEY COMPLICATION (HCC): ICD-10-CM

## 2022-04-17 DIAGNOSIS — E11.21 TYPE 2 DIABETES MELLITUS WITH DIABETIC NEPHROPATHY, WITH LONG-TERM CURRENT USE OF INSULIN (HCC): ICD-10-CM

## 2022-04-18 RX ORDER — GLIMEPIRIDE 4 MG/1
TABLET ORAL
Qty: 30 TABLET | Refills: 5 | OUTPATIENT
Start: 2022-04-18

## 2022-04-18 RX ORDER — GLIMEPIRIDE 2 MG/1
TABLET ORAL
Qty: 30 TABLET | Refills: 5 | OUTPATIENT
Start: 2022-04-18

## 2022-04-18 RX ORDER — CALCIUM CITRATE/VITAMIN D3 200MG-6.25
TABLET ORAL
Qty: 200 STRIP | Refills: 5 | Status: SHIPPED | OUTPATIENT
Start: 2022-04-18 | End: 2022-07-22 | Stop reason: SDUPTHER

## 2022-04-18 RX ORDER — ATORVASTATIN CALCIUM 20 MG/1
TABLET, FILM COATED ORAL
Qty: 30 TABLET | Refills: 5 | Status: SHIPPED | OUTPATIENT
Start: 2022-04-18 | End: 2022-07-22 | Stop reason: SDUPTHER

## 2022-04-18 RX ORDER — LISINOPRIL 40 MG/1
TABLET ORAL
Qty: 30 TABLET | Refills: 5 | Status: SHIPPED | OUTPATIENT
Start: 2022-04-18 | End: 2022-07-22 | Stop reason: SDUPTHER

## 2022-05-11 ENCOUNTER — OFFICE VISIT (OUTPATIENT)
Dept: ENDOCRINOLOGY | Age: 62
End: 2022-05-11
Payer: COMMERCIAL

## 2022-05-11 VITALS
SYSTOLIC BLOOD PRESSURE: 138 MMHG | HEIGHT: 63 IN | WEIGHT: 112.8 LBS | DIASTOLIC BLOOD PRESSURE: 62 MMHG | HEART RATE: 65 BPM | BODY MASS INDEX: 19.99 KG/M2 | OXYGEN SATURATION: 100 %

## 2022-05-11 DIAGNOSIS — Z79.4 TYPE 2 DIABETES MELLITUS WITH DIABETIC NEPHROPATHY, WITH LONG-TERM CURRENT USE OF INSULIN (HCC): ICD-10-CM

## 2022-05-11 DIAGNOSIS — E11.21 TYPE 2 DIABETES MELLITUS WITH DIABETIC NEPHROPATHY, WITH LONG-TERM CURRENT USE OF INSULIN (HCC): ICD-10-CM

## 2022-05-11 DIAGNOSIS — E11.29 TYPE 2 DIABETES MELLITUS WITH OTHER DIABETIC KIDNEY COMPLICATION (HCC): ICD-10-CM

## 2022-05-11 PROCEDURE — 1036F TOBACCO NON-USER: CPT | Performed by: INTERNAL MEDICINE

## 2022-05-11 PROCEDURE — 2022F DILAT RTA XM EVC RTNOPTHY: CPT | Performed by: INTERNAL MEDICINE

## 2022-05-11 PROCEDURE — G8427 DOCREV CUR MEDS BY ELIG CLIN: HCPCS | Performed by: INTERNAL MEDICINE

## 2022-05-11 PROCEDURE — 3017F COLORECTAL CA SCREEN DOC REV: CPT | Performed by: INTERNAL MEDICINE

## 2022-05-11 PROCEDURE — 3046F HEMOGLOBIN A1C LEVEL >9.0%: CPT | Performed by: INTERNAL MEDICINE

## 2022-05-11 PROCEDURE — 99214 OFFICE O/P EST MOD 30 MIN: CPT | Performed by: INTERNAL MEDICINE

## 2022-05-11 PROCEDURE — G8420 CALC BMI NORM PARAMETERS: HCPCS | Performed by: INTERNAL MEDICINE

## 2022-05-11 RX ORDER — INSULIN GLARGINE 100 [IU]/ML
32 INJECTION, SOLUTION SUBCUTANEOUS EVERY MORNING
Qty: 5 PEN | Refills: 5 | Status: SHIPPED | OUTPATIENT
Start: 2022-05-11 | End: 2022-07-22 | Stop reason: SDUPTHER

## 2022-05-11 NOTE — PROGRESS NOTES
Patient ID:   Edvin Pabon is a 60 y.o. female  Chief Complaint:   Edvin Pabon presents for an evaluation of Type 2 Diabetes Mellitus , Hyperlipidemia and hypertension. Subjective:   Type 2 Diabetes Mellitus diagnosed around 2010  On insulin since 2012  Previous regimen:Taking Admelog 15 units tidac and 5 units for snacks. Basaglar 40 units once a day at night. VGO stopped due to hypoglycemias     Multiple hospitalizations in 2021 due to weakness, anemia   She is recently released from a nursing home     Not taking:   Synjardy 12.5-1000mg, two tabs in am. Last pick ups: Aug 2020 and Feb 2021. Admits making poor dietary choices, trying to cut down fried. Currently on  Lantus 26 units daily in am.  Victoza 1.8 mg daily in AM  Glimepiride 4 mg with breakfast or first meal of the day   Humalog SSI as needed      She thinks she is eating too much bread , pasta     Checks blood sugars 2-3 times per day. Reportedly >200    AM:     Lunch:   Supper:    HS:        Hypoglycemias: Once in last 4 weeks      Meals: 3, dinner is big. Snacks (jellos, fruits, pretzels) after every meal because she is hungry. Exercise: None    Denies chest pain, exertional dyspnea. Family history of CAD: Both parents  Denies smoking/ alcohol.         The following portions of the patient's history were reviewed and updated as appropriate:       Family History   Problem Relation Age of Onset    Cancer Mother         breast    Cancer Father     Heart Failure Neg Hx     High Cholesterol Neg Hx     Hypertension Neg Hx     Migraines Neg Hx     Rashes/Skin Problems Neg Hx     Seizures Neg Hx     Stroke Neg Hx     Thyroid Disease Neg Hx     Diabetes Neg Hx          Social History     Socioeconomic History    Marital status:      Spouse name: Not on file    Number of children: 1    Years of education: Not on file    Highest education level: Not on file   Occupational History    Occupation:    Tobacco Use    Smoking status: Former Smoker     Packs/day: 0.50     Years: 20.00     Pack years: 10.00     Types: Cigarettes, Cigars     Quit date: 7/3/2014     Years since quittin.8    Smokeless tobacco: Never Used   Vaping Use    Vaping Use: Never used   Substance and Sexual Activity    Alcohol use: No     Alcohol/week: 0.0 standard drinks    Drug use: No    Sexual activity: Not on file   Other Topics Concern    Not on file   Social History Narrative    Not on file     Social Determinants of Health     Financial Resource Strain:     Difficulty of Paying Living Expenses: Not on file   Food Insecurity:     Worried About Running Out of Food in the Last Year: Not on file    Tabby of Food in the Last Year: Not on file   Transportation Needs:     Lack of Transportation (Medical): Not on file    Lack of Transportation (Non-Medical):  Not on file   Physical Activity:     Days of Exercise per Week: Not on file    Minutes of Exercise per Session: Not on file   Stress:     Feeling of Stress : Not on file   Social Connections:     Frequency of Communication with Friends and Family: Not on file    Frequency of Social Gatherings with Friends and Family: Not on file    Attends Buddhist Services: Not on file    Active Member of [address] [place] [address] [employer] or Organizations: Not on file    Attends Club or Organization Meetings: Not on file    Marital Status: Not on file   Intimate Partner Violence:     Fear of Current or Ex-Partner: Not on file    Emotionally Abused: Not on file    Physically Abused: Not on file    Sexually Abused: Not on file   Housing Stability:     Unable to Pay for Housing in the Last Year: Not on file    Number of [place] in the Last Year: Not on file    Unstable Housing in the Last Year: Not on file       Past Medical History:   Diagnosis Date    Abnormal brain MRI 2017    Partially empty sella and minimal chronic small vessel ischemic disease    Acute bilateral low back pain without sciatica 11/2/2016    SHARRON (acute kidney injury) ([facility] 7/5/2017    Arthritis     back    Bipolar disorder ([address] 10/18/2008    CAD (coronary artery disease)     stent placed 6/8/20    Cancer [facility]) 2015    bilateral breast:s/p lumpectomy/radiation:under care care of breast specialist:Dr. Boone     Carotid stenosis, bilateral:<50%:per US 7/2016 7/15/2016    Carpal tunnel syndrome 10/18/2008    Cervical cancer screening 2014    Nml per pt'.  Coronary artery disease of native artery of native heart with stable angina pectoris ([address] 6/9/2020    DDD (degenerative disc disease), lumbar 7/18/2018    Depression     under care of pschiatrist:Dr. 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     Scoliosis     Spondylosis of lumbar region without myelopathy or radiculopathy 3/10/2017    Transient cerebral ischemia 07/15/2016    TIA:7/10/16    Unspecified cerebral artery occlusion with cerebral infarction     TIA       Past Surgical History:   Procedure Laterality Date    BREAST LUMPECTOMY  2015    Bilateral:breast cancer    CARDIAC CATHETERIZATION  06/08/2020    Dr. Aurelio Rivas), DAYANA to Diag 1    COLONOSCOPY N/A 2/1/2021    COLONOSCOPY DIAGNOSTIC performed by Hiram Marina MD at [place] [address]  2/3/2021    CT BONE MARROW BIOPSY 2/3/2021 MD Aziza Hurtado [facility] CT SCAN    HYSTERECTOMY Benign:no cervical cancer per pt'    KIDNEY REMOVAL      right    OTHER SURGICAL HISTORY Right     orif right ankle    TEMPORAL ARTERY BIOPSY Right 8/9/2021    RIGHT TEMPORAL ARTERY BIOPSY LIGATION performed by Vickey Ricci MD at [facility] at [facility] ENDOSCOPY N/A 1/29/2021    EGD BIOPSY performed by Matthieu Palacios MD at [address] [place] [address]   Allergen Reactions    Morphine Anaphylaxis and Hives     feels like throat is closing    Penicillins Hives and Swelling    Codeine Hives and Rash    Penicillin G Rash         Current Outpatient Medications:     insulin glargine (LANTUS SOLOSTAR) 100 UNIT/ML injection pen, Inject 32 Units into the skin every morning, Disp: 5 pen, Rfl: 5    TRUE METRIX BLOOD GLUCOSE TEST strip, USE AS DIRECTED TO TEST 4 TIMES A DAY, Disp: 200 strip, Rfl: 5    lisinopril (PRINIVIL;ZESTRIL) 40 MG tablet, TAKE 1 TABLET BY MOUTH EVERY DAY, Disp: 30 tablet, Rfl: 5    atorvastatin (LIPITOR) 20 MG tablet, TAKE 1 TABLET BY MOUTH EVERY DAY, Disp: 30 tablet, Rfl: 5    ticagrelor (BRILINTA) 90 MG TABS tablet, TAKE 1 TABLET BY MOUTH TWICE A DAY, Disp: 60 tablet, Rfl: 11    Insulin Pen Needle 32G X 4 MM MISC, 1 each by Does not apply route daily, Disp: 100 each, Rfl: 3    glimepiride (AMARYL) 4 MG tablet, 4 mg with breakfast or first meal of the day, Disp: 30 tablet, Rfl: 5    VICTOZA 18 MG/3ML SOPN SC injection, INJECT 1.8 MG UNDER THE SKIN ONCE DAILY (Patient taking differently: Inject 1.8 mg into the skin daily ), Disp: 2 pen, Rfl: 5    Blood Glucose Monitoring Suppl (TRUE METRIX METER) w/Device KIT, Used to  check blood sugar 4 times eyad (Patient taking differently: Used to  check blood sugar 3 times eyad), Disp: 1 kit, Rfl: 1    nitroGLYCERIN (NITROSTAT) 0.4 MG SL tablet, up to max of 3 total doses.  If no relief after 1 dose, call 911., Disp: 25 tablet, Rfl: 0    insulin lispro, 1 Unit Dial, (HUMALOG KWIKPEN) 100 UNIT/ML SOPN, Inject 8 Units into the skin 3 times daily , Disp: , Rfl:     paliperidone (INVEGA) 9 MG extended release tablet, Take 9 mg by mouth every morning , Disp: , Rfl:     pantoprazole (PROTONIX) 40 MG tablet, Take 40 mg by mouth daily , Disp: , Rfl:     ferrous sulfate (IRON 325) 325 (65 Fe) MG tablet, Take 325 mg by mouth daily (with breakfast), Disp: , Rfl:     oxyCODONE-acetaminophen (PERCOCET) 7.5-325 MG per tablet, TAKE 1 TABLET BY MOUTH EVERY 6 (SIX) HOURS AS NEEDED FOR UP TO 28 DAYS., Disp: , Rfl:     clonazePAM (KLONOPIN) 0.5 MG tablet, Take 0.5 mg by mouth 3 times daily as needed. , Disp: , Rfl:     calcium carbonate-vitamin D (CALTRATE) 600-400 MG-UNIT TABS per tab, Take 1 tablet by mouth daily , Disp: , Rfl:     aspirin 81 MG tablet, Take 81 mg by mouth daily, Disp: , Rfl:     traZODone (DESYREL) 100 MG tablet, Take 200 mg by mouth nightly , Disp: , Rfl:     gabapentin (NEURONTIN) 600 MG tablet, Take 0.5 tablets by mouth 2 times daily for 3 days. (Patient taking differently: Take 600 mg by mouth 3 times daily. ), Disp: 90 tablet, Rfl: 3      Review of Systems:    Constitutional: Negative for fever, chills, and unexpected weight change. HENT: Negative for congestion, ear pain, rhinorrhea,  sore throat and trouble swallowing. Eyes: Negative for photophobia, redness, itching. Respiratory: Negative for cough, shortness of breath and sputum. Cardiovascular: Negative for chest pain, palpitations and leg swelling. Gastrointestinal: Negative for nausea, vomiting, abdominal pain, diarrhea, constipation. Endocrine: Negative for cold intolerance, heat intolerance, polydipsia, polyphagia and polyuria. Genitourinary: Negative for dysuria, urgency, frequency, hematuria and flank pain. Musculoskeletal: Negative for myalgias, back pain, arthralgias and neck pain. Skin/Nail: Negative for rash, itching. Normal nails.    Neurological: Negative for seizures, weakness, light-headedness, numbness and headaches. Hematological/ Lymph nodes: Negative for adenopathy. Does not bruise/bleed easily. Psychiatric/Behavioral: Negative for suicidal ideas, depression, anxiety, sleep disturbance and decreased concentration. Objective:   Physical Exam:  /62 (Site: Left Upper Arm, Position: Sitting, Cuff Size: Small Adult)   Pulse 65   Ht 5' 3\" (1.6 m)   Wt 112 lb 12.8 oz (51.2 kg)   SpO2 100%   BMI 19.98 kg/m²     Constitutional: Patient is oriented to person, place, and time. Patient appears well-developed and well-nourished. HENT:               DHJK: Normocephalic and atraumatic.                Eyes: Conjunctivae and EOM are normal.                Neck: Normal range of motion. Cardiovascular: Normal rate, regular rhythm and normal heart sounds.  heart murmur present. Pulmonary/Chest: Effort normal and breath sounds normal.      Neurological: Patient is alert and oriented to person, place, and time. Skin: Skin is warm and dry. Psychiatric: Patient has a normal mood and affect.  Patient behavior is normal.     Lab Review:    Admission on 03/31/2022, Discharged on 03/31/2022   Component Date Value Ref Range Status    Color, UA 03/31/2022 Yellow  Straw/Yellow Final    Clarity, UA 03/31/2022 Clear  Clear Final    Glucose, Ur 03/31/2022 >=1000* Negative mg/dL Final    Bilirubin Urine 03/31/2022 Negative  Negative Final    Ketones, Urine 03/31/2022 Negative  Negative mg/dL Final    Specific Gravity, UA 03/31/2022 <=1.005  1.005 - 1.030 Final    Blood, Urine 03/31/2022 Negative  Negative Final    pH, UA 03/31/2022 6.0  5.0 - 8.0 Final    Protein, UA 03/31/2022 Negative  Negative mg/dL Final    Urobilinogen, Urine 03/31/2022 0.2  <2.0 E.U./dL Final    Nitrite, Urine 03/31/2022 Negative  Negative Final    Leukocyte Esterase, Urine 03/31/2022 Negative  Negative Final    Microscopic Examination 03/31/2022 Not Indicated   Final    Urine Type 03/31/2022 NotGiven   Final    WBC, UA 03/31/2022 None seen  0 - 5 /HPF Final    RBC, UA 03/31/2022 None seen  0 - 4 /HPF Final    Renal Epithelial, UA 03/31/2022 0-1  0 - 1 /HPF Final    POC Glucose 03/31/2022 >600* 70 - 99 mg/dl Final    Performed on 03/31/2022 ACCU-CHEK   Final    POC Glucose 03/31/2022 >600* 70 - 99 mg/dl Final    Performed on 03/31/2022 ACCU-CHEK   Final    WBC 03/31/2022 5.4  4.0 - 11.0 K/uL Final    RBC 03/31/2022 3.61* 4.00 - 5.20 M/uL Final    Hemoglobin 03/31/2022 10.1* 12.0 - 16.0 g/dL Final    Hematocrit 03/31/2022 31.5* 36.0 - 48.0 % Final    MCV 03/31/2022 87.3  80.0 - 100.0 fL Final    MCH 03/31/2022 28.0  26.0 - 34.0 pg Final    MCHC 03/31/2022 32.1  31.0 - 36.0 g/dL Final    RDW 03/31/2022 12.9  12.4 - 15.4 % Final    Platelets 10/79/5861 176  135 - 450 K/uL Final    MPV 03/31/2022 10.1  5.0 - 10.5 fL Final    Neutrophils % 03/31/2022 65.2  % Final    Lymphocytes % 03/31/2022 28.0  % Final    Monocytes % 03/31/2022 6.1  % Final    Eosinophils % 03/31/2022 0.4  % Final    Basophils % 03/31/2022 0.3  % Final    Neutrophils Absolute 03/31/2022 3.5  1.7 - 7.7 K/uL Final    Lymphocytes Absolute 03/31/2022 1.5  1.0 - 5.1 K/uL Final    Monocytes Absolute 03/31/2022 0.3  0.0 - 1.3 K/uL Final    Eosinophils Absolute 03/31/2022 0.0  0.0 - 0.6 K/uL Final    Basophils Absolute 03/31/2022 0.0  0.0 - 0.2 K/uL Final    Sodium 03/31/2022 129* 136 - 145 mmol/L Final    Potassium reflex Magnesium 03/31/2022 5.0  3.5 - 5.1 mmol/L Final    Chloride 03/31/2022 92* 99 - 110 mmol/L Final    CO2 03/31/2022 28  21 - 32 mmol/L Final    Anion Gap 03/31/2022 9  3 - 16 Final    Glucose 03/31/2022 668* 70 - 99 mg/dL Final    BUN 03/31/2022 21* 7 - 20 mg/dL Final    CREATININE 03/31/2022 1.6* 0.6 - 1.2 mg/dL Final    GFR Non- 03/31/2022 33* >60 Final    GFR  03/31/2022 39* >60 Final    Calcium 03/31/2022 9.5  8.3 - 10.6 mg/dL Final    Total Protein 03/31/2022 7.6  6.4 - 8.2 g/dL Final    Albumin 03/31/2022 3.9  3.4 - 5.0 g/dL Final    Albumin/Globulin Ratio 03/31/2022 1.1  1.1 - 2.2 Final    Total Bilirubin 03/31/2022 0.4  0.0 - 1.0 mg/dL Final    Alkaline Phosphatase 03/31/2022 534* 40 - 129 U/L Final    ALT 03/31/2022 92* 10 - 40 U/L Final    AST 03/31/2022 36  15 - 37 U/L Final    Beta-Hydroxybutyrate 03/31/2022 0.44* 0.00 - 0.27 mmol/L Final    Magnesium 03/31/2022 2.10  1.80 - 2.40 mg/dL Final    Phosphorus 03/31/2022 3.8  2.5 - 4.9 mg/dL Final    pH, Kyle 03/31/2022 7.355  7.350 - 7.450 Final    pCO2, Kyle 03/31/2022 53.5* 40.0 - 50.0 mmHg Final    pO2, Kyle 03/31/2022 35.9  25.0 - 40.0 mmHg Final    HCO3, Venous 03/31/2022 29.2* 23.0 - 29.0 mmol/L Final    Base Excess, Kyle 03/31/2022 2.8  -3.0 - 3.0 mmol/L Final    O2 Sat, Kyle 03/31/2022 68  Not Established % Final    Carboxyhemoglobin 03/31/2022 1.5  0.0 - 1.5 % Final    MetHgb, Kyle 03/31/2022 0.5  <1.5 % Final    TC02 (Calc), Kyle 03/31/2022 31  Not Established mmol/L Final    O2 Therapy 03/31/2022 Unknown   Final    Ventricular Rate 03/31/2022 54  BPM Final    Atrial Rate 03/31/2022 54  BPM Final    P-R Interval 03/31/2022 202  ms Final    QRS Duration 03/31/2022 76  ms Final    Q-T Interval 03/31/2022 444  ms Final    QTc Calculation (Bazett) 03/31/2022 421  ms Final    P Axis 03/31/2022 66  degrees Final    R Axis 03/31/2022 -15  degrees Final    T Axis 03/31/2022 60  degrees Final    Diagnosis 03/31/2022 Sinus bradycardiaOtherwise normal ECGWhen compared with ECG of 14-FEB-2022 15:38,No significant change was foundConfirmed by Bonny Herrera ([id number]) on 4/1/2022 4:18:11 PM   Final    POC Glucose 03/31/2022 446* 70 - 99 mg/dl Final    Performed on 03/31/2022 ACCU-CHEK   Final    POC Glucose 03/31/2022 356* 70 - 99 mg/dl Final    Performed on 03/31/2022 ACCU-CHEK   Final   Admission on 02/14/2022, Discharged on 02/16/2022   Component Date Value Ref Range Status    POC Glucose 02/14/2022 428* 70 - 99 mg/dl Final    Performed on 02/14/2022 ACCU-CHEK   Final    WBC 02/14/2022 5.4  4.0 - 11.0 K/uL Final    RBC 02/14/2022 3.90* 4.00 - 5.20 M/uL Final    Hemoglobin 02/14/2022 11.1* 12.0 - 16.0 g/dL Final    Hematocrit 02/14/2022 33.9* 36.0 - 48.0 % Final    MCV 02/14/2022 86.9  80.0 - 100.0 fL Final    MCH 02/14/2022 28.4  26.0 - 34.0 pg Final    MCHC 02/14/2022 32.7  31.0 - 36.0 g/dL Final    RDW 02/14/2022 12.4  12.4 - 15.4 % Final    Platelets 64/24/4376 136  135 - 450 K/uL Final    MPV 02/14/2022 9.7  5.0 - 10.5 fL Final    Neutrophils % 02/14/2022 65.6  % Final    Lymphocytes % 02/14/2022 27.8  % Final    Monocytes % 02/14/2022 5.4  % Final    Eosinophils % 02/14/2022 0.7  % Final    Basophils % 02/14/2022 0.5  % Final    Neutrophils Absolute 02/14/2022 3.6  1.7 - 7.7 K/uL Final    Lymphocytes Absolute 02/14/2022 1.5  1.0 - 5.1 K/uL Final    Monocytes Absolute 02/14/2022 0.3  0.0 - 1.3 K/uL Final    Eosinophils Absolute 02/14/2022 0.0  0.0 - 0.6 K/uL Final    Basophils Absolute 02/14/2022 0.0  0.0 - 0.2 K/uL Final    Sodium 02/14/2022 136  136 - 145 mmol/L Final    Potassium 02/14/2022 4.4  3.5 - 5.1 mmol/L Final    Chloride 02/14/2022 97* 99 - 110 mmol/L Final    CO2 02/14/2022 27  21 - 32 mmol/L Final    Anion Gap 02/14/2022 12  3 - 16 Final    Glucose 02/14/2022 417* 70 - 99 mg/dL Final    BUN 02/14/2022 19  7 - 20 mg/dL Final    CREATININE 02/14/2022 1.1  0.6 - 1.2 mg/dL Final    GFR Non- 02/14/2022 50* >60 Final    GFR  02/14/2022 >60  >60 Final    Calcium 02/14/2022 9.5  8.3 - 10.6 mg/dL Final    Total Protein 02/14/2022 7.0  6.4 - 8.2 g/dL Final    Albumin 02/14/2022 4.0  3.4 - 5.0 g/dL Final    Albumin/Globulin Ratio 02/14/2022 1.3  1.1 - 2.2 Final    Total Bilirubin 02/14/2022 <0.2  0.0 - 1.0 mg/dL Final    Alkaline Phosphatase 02/14/2022 215* 40 - 129 U/L Final    ALT 02/14/2022 66* 10 - 40 U/L Final    AST 02/14/2022 31  15 - 37 U/L Final    Sed Rate 02/14/2022 49* 0 - 30 mm/Hr Final    CRP 02/14/2022 <3.0  0.0 - 5.1 mg/L Final    Troponin 02/14/2022 <0.01  <0.01 ng/mL Final    Ventricular Rate 02/14/2022 58  BPM Final    Atrial Rate 02/14/2022 58  BPM Final    P-R Interval 02/14/2022 158  ms Final    QRS Duration 02/14/2022 80  ms Final    Q-T Interval 02/14/2022 448  ms Final    QTc Calculation (Bazett) 02/14/2022 439  ms Final    P Axis 02/14/2022 80  degrees Final    R Axis 02/14/2022 -23  degrees Final    T Axis 02/14/2022 58  degrees Final    Diagnosis 02/14/2022 Sinus bradycardiaOtherwise normal ECGWhen compared with ECG of 02-NOV-2021 12:42,No significant change was foundConfirmed by Jas Butterfield MD, Minerva Doshi ([id number]) on 2/14/2022 6:56:54 PM   Final    pH, Kyle 02/14/2022 7.435  7.350 - 7.450 Final    pCO2, Kyle 02/14/2022 38.9* 40.0 - 50.0 mmHg Final    pO2, Kyle 02/14/2022 55.2* 25.0 - 40.0 mmHg Final    HCO3, Venous 02/14/2022 25.5  23.0 - 29.0 mmol/L Final    Base Excess, Kyle 02/14/2022 1.3  -3.0 - 3.0 mmol/L Final    O2 Sat, Kyle 02/14/2022 90  Not Established % Final    Carboxyhemoglobin 02/14/2022 4.5* 0.0 - 1.5 % Final    MetHgb, Kyle 02/14/2022 0.3  <1.5 % Final    TC02 (Calc), Kyle 02/14/2022 27  Not Established mmol/L Final    O2 Therapy 02/14/2022 Unknown   Final    Specimen Status 02/14/2022 KIMMY   Final    Color, UA 02/14/2022 Yellow  Straw/Yellow Final    Clarity, UA 02/14/2022 Clear  Clear Final    Glucose, Ur 02/14/2022 >=1000* Negative mg/dL Final    Bilirubin Urine 02/14/2022 Negative  Negative Final    Ketones, Urine 02/14/2022 Negative  Negative mg/dL Final    Specific Gravity, UA 02/14/2022 1.015  1.005 - 1.030 Final    Blood, Urine 02/14/2022 TRACE-INTACT* Negative Final    pH, UA 02/14/2022 6.5  5.0 - 8.0 Final    Protein, UA 02/14/2022 30* Negative mg/dL Final    Urobilinogen, Urine 02/14/2022 0.2  <2.0 E.U./dL Final    Nitrite, Urine 02/14/2022 Negative  Negative Final    Leukocyte Esterase, Urine 02/14/2022 Negative  Negative Final    Microscopic Examination 02/14/2022 YES   Final    Urine Type 02/14/2022 NotGiven   Final    WBC, UA 02/14/2022 0-2  0 - 5 /HPF Final    RBC, UA 02/14/2022 3-4  0 - 4 /HPF Final    Epithelial Cells, UA 02/14/2022 11-20* 0 - 5 /HPF Final    Bacteria, UA 02/14/2022 Rare* None Seen /HPF Final    Amphetamine Screen, Urine 02/14/2022 Neg  Negative <1000ng/mL Final    Barbiturate Screen, Ur 02/14/2022 Neg  Negative <200 ng/mL Final    Benzodiazepine Screen, Urine 02/14/2022 Neg  Negative <200 ng/mL Final    Cannabinoid Scrn, Ur 02/14/2022 Neg  Negative <50 ng/mL Final    Cocaine Metabolite Screen, Urine 02/14/2022 Neg  Negative <300 ng/mL Final    Opiate Scrn, Ur 02/14/2022 Neg  Negative <300 ng/mL Final    PCP Screen, Urine 02/14/2022 Neg  Negative <25 ng/mL Final    Methadone Screen, Urine 02/14/2022 Neg  Negative <300 ng/mL Final    Propoxyphene Scrn, Ur 02/14/2022 Neg  Negative <300 ng/mL Final    Oxycodone Urine 02/14/2022 POSITIVE* Negative <100 ng/ml Final    pH, UA 02/14/2022 6.5   Final    Drug Screen Comment: 02/14/2022 see below   Final    POC Glucose 02/14/2022 268* 70 - 99 mg/dl Final    Performed on 02/14/2022 ACCU-CHEK   Final    Hemoglobin A1C 02/15/2022 11.5  See comment % Final    eAG 02/15/2022 283.4  mg/dL Final    POC Glucose 02/15/2022 45* 70 - 99 mg/dl Final    Performed on 02/15/2022 ACCU-CHEK   Final    POC Glucose 02/15/2022 60* 70 - 99 mg/dl Final    Performed on 02/15/2022 ACCU-CHEK   Final    POC Glucose 02/15/2022 95  70 - 99 mg/dl Final    Performed on 02/15/2022 ACCU-CHEK   Final    POC Glucose 02/15/2022 340* 70 - 99 mg/dl Final    Performed on 02/15/2022 ACCU-CHEK   Final    POC Glucose 02/15/2022 429* 70 - 99 mg/dl Final    Performed on 02/15/2022 ACCU-CHEK   Final    POC Glucose 02/15/2022 41* 70 - 99 mg/dl Final    Performed on 02/15/2022 ACCU-CHEK   Final    POC Glucose 02/15/2022 301* 70 - 99 mg/dl Final    Performed on 02/15/2022 ACCU-CHEK   Final    POC Glucose 02/15/2022 281* 70 - 99 mg/dl Final    Performed on 02/15/2022 ACCU-CHEK   Final    POC Glucose 02/15/2022 122* 70 - 99 mg/dl Final    Performed on 02/15/2022 ACCU-CHEK   Final    WBC 02/16/2022 3.7* 4.0 - 11.0 K/uL Final    RBC 02/16/2022 3.99* 4.00 - 5.20 M/uL Final    Hemoglobin 02/16/2022 11.4* 12.0 - 16.0 g/dL Final    Hematocrit 02/16/2022 35.2* 36.0 - 48.0 % Final    MCV 02/16/2022 88.2  80.0 - 100.0 fL Final    MCH 02/16/2022 28.6  26.0 - 34.0 pg Final    MCHC 02/16/2022 32.4  31.0 - 36.0 g/dL Final    RDW 02/16/2022 12.8  12.4 - 15.4 % Final    Platelets 13/99/6372 116* 135 - 450 K/uL Final    MPV 02/16/2022 9.8  5.0 - 10.5 fL Final    Neutrophils % 02/16/2022 49.7  % Final    Lymphocytes % 02/16/2022 41.4  % Final    Monocytes % 02/16/2022 7.6  % Final    Eosinophils % 02/16/2022 1.1  % Final    Basophils % 02/16/2022 0.2  % Final    Neutrophils Absolute 02/16/2022 1.8  1.7 - 7.7 K/uL Final    Lymphocytes Absolute 02/16/2022 1.5  1.0 - 5.1 K/uL Final    Monocytes Absolute 02/16/2022 0.3  0.0 - 1.3 K/uL Final    Eosinophils Absolute 02/16/2022 0.0  0.0 - 0.6 K/uL Final    Basophils Absolute 02/16/2022 0.0  0.0 - 0.2 K/uL Final    Sodium 02/16/2022 133* 136 - 145 mmol/L Final    Potassium 02/16/2022 5.0  3.5 - 5.1 mmol/L Final    Chloride 02/16/2022 99  99 - 110 mmol/L Final    CO2 02/16/2022 23  21 - 32 mmol/L Final    Anion Gap 02/16/2022 11  3 - 16 Final    Glucose 02/16/2022 274* 70 - 99 mg/dL Final    BUN 02/16/2022 15  7 - 20 mg/dL Final    CREATININE 02/16/2022 1.3* 0.6 - 1.2 mg/dL Final    GFR Non- 02/16/2022 41* >60 Final    GFR  02/16/2022 50* >60 Final    Calcium 02/16/2022 8.6  8.3 - 10.6 mg/dL Final    Magnesium 02/16/2022 2.00  1.80 - 2.40 mg/dL Final    POC Glucose 02/15/2022 230* 70 - 99 mg/dl Final    Performed on 02/15/2022 ACCU-CHEK   Final    POC Glucose 02/16/2022 291* 70 - 99 mg/dl Final    Performed on 02/16/2022 ACCU-CHEK   Final    POC Glucose 02/16/2022 262* 70 - 99 mg/dl Final    Performed on 02/16/2022 ACCU-CHEK   Final    POC Glucose 02/16/2022 247* 70 - 99 mg/dl Final    Performed on 02/16/2022 ACCU-CHEK   Final    POC Glucose 02/16/2022 185* 70 - 99 mg/dl Final    Performed on 02/16/2022 ACCU-CHEK   Final    Rejected Test 02/16/2022 TSH FRT4   Final    Reason for Rejection 02/16/2022 see below   Final    TSH 02/16/2022 0.75  0.27 - 4.20 uIU/mL Final    T4 Free 02/16/2022 1.4  0.9 - 1.8 ng/dL Final   Admission on 11/22/2021, Discharged on 11/23/2021   Component Date Value Ref Range Status    POC Glucose 11/22/2021 384* 70 - 99 mg/dl Final    Performed on 11/22/2021 ACCU-CHEK   Final    WBC 11/22/2021 4.8  4.0 - 11.0 K/uL Final    RBC 11/22/2021 3.68* 4.00 - 5.20 M/uL Final    Hemoglobin 11/22/2021 10.7* 12.0 - 16.0 g/dL Final    Hematocrit 11/22/2021 33.2* 36.0 - 48.0 % Final    MCV 11/22/2021 90.4  80.0 - 100.0 fL Final    MCH 11/22/2021 29.1  26.0 - 34.0 pg Final    MCHC 11/22/2021 32.2  31.0 - 36.0 g/dL Final    RDW 11/22/2021 12.0* 12.4 - 15.4 % Final    Platelets 23/00/4560 149  135 - 450 K/uL Final    MPV 11/22/2021 8.8  5.0 - 10.5 fL Final    Neutrophils % 11/22/2021 69.8  % Final    Lymphocytes % 11/22/2021 20.0  % Final    Monocytes % 11/22/2021 9.6  % Final    Eosinophils % 11/22/2021 0.4  % Final    Basophils % 11/22/2021 0.2  % Final    Neutrophils Absolute 11/22/2021 3.3  1.7 - 7.7 K/uL Final    Lymphocytes Absolute 11/22/2021 1.0  1.0 - 5.1 K/uL Final    Monocytes Absolute 11/22/2021 0.5  0.0 - 1.3 K/uL Final    Eosinophils Absolute 11/22/2021 0.0  0.0 - 0.6 K/uL Final    Basophils Absolute 11/22/2021 0.0  0.0 - 0.2 K/uL Final    Sodium 11/22/2021 134* 136 - 145 mmol/L Final    Potassium reflex Magnesium 11/22/2021 3.8  3.5 - 5.1 mmol/L Final    Chloride 11/22/2021 98* 99 - 110 mmol/L Final    CO2 11/22/2021 26  21 - 32 mmol/L Final    Anion Gap 11/22/2021 10  3 - 16 Final    Glucose 11/22/2021 306* 70 - 99 mg/dL Final    BUN 11/22/2021 14  7 - 20 mg/dL Final    CREATININE 11/22/2021 1.1  0.6 - 1.2 mg/dL Final    GFR Non- 11/22/2021 50* >60 Final    GFR  11/22/2021 >60  >60 Final    Calcium 11/22/2021 9.3  8.3 - 10.6 mg/dL Final    Total Protein 11/22/2021 7.5  6.4 - 8.2 g/dL Final    Albumin 11/22/2021 3.6  3.4 - 5.0 g/dL Final    Albumin/Globulin Ratio 11/22/2021 0.9* 1.1 - 2.2 Final    Total Bilirubin 11/22/2021 <0.2  0.0 - 1.0 mg/dL Final    Alkaline Phosphatase 11/22/2021 170* 40 - 129 U/L Final    ALT 11/22/2021 57* 10 - 40 U/L Final    AST 11/22/2021 24  15 - 37 U/L Final    Troponin 11/22/2021 <0.01  <0.01 ng/mL Final    Pro-BNP 11/22/2021 248* 0 - 124 pg/mL Final    Rapid Influenza A Ag 11/22/2021 Negative  Negative Final    Rapid Influenza B Ag 11/22/2021 Negative  Negative Final    SARS-CoV-2, NAAT 11/22/2021 DETECTED* Not Detected Final    POC Glucose 11/22/2021 223* 70 - 99 mg/dl Final    Performed on 11/22/2021 ACCU-CHEK   Final   Admission on 11/02/2021, Discharged on 11/03/2021   Component Date Value Ref Range Status    Ventricular Rate 11/02/2021 78  BPM Final    Atrial Rate 11/02/2021 78  BPM Final    P-R Interval 11/02/2021 138  ms Final    QRS Duration 11/02/2021 74  ms Final    Q-T Interval 11/02/2021 376  ms Final    QTc Calculation (Bazett) 11/02/2021 428  ms Final    P Axis 11/02/2021 78  degrees Final    R Axis 11/02/2021 -40  degrees Final    T Axis 11/02/2021 72  degrees Final    Diagnosis 11/02/2021 Normal sinus rhythmLeft axis deviationAbnormal ECGWhen compared with ECG of 27-SEP-2021 19:47,No significant change was foundConfirmed by Vivienne Cooper ([id number]) on 11/2/2021 3:11:19 PM   Final    WBC 11/02/2021 5.1  4.0 - 11.0 K/uL Final    RBC 11/02/2021 3.61* 4.00 - 5.20 M/uL Final    Hemoglobin 11/02/2021 10.6* 12.0 - 16.0 g/dL Final    Hematocrit 11/02/2021 33.4* 36.0 - 48.0 % Final    MCV 11/02/2021 92.5  80.0 - 100.0 fL Final    MCH 11/02/2021 29.3  26.0 - 34.0 pg Final    MCHC 11/02/2021 31.7  31.0 - 36.0 g/dL Final    RDW 11/02/2021 12.2* 12.4 - 15.4 % Final    Platelets 33/97/3092 140  135 - 450 K/uL Final    MPV 11/02/2021 8.7  5.0 - 10.5 fL Final    Neutrophils % 11/02/2021 75.5  % Final    Lymphocytes % 11/02/2021 17.3  % Final    Monocytes % 11/02/2021 6.1  % Final    Eosinophils % 11/02/2021 0.8  % Final    Basophils % 11/02/2021 0.3  % Final    Neutrophils Absolute 11/02/2021 3.9  1.7 - 7.7 K/uL Final    Lymphocytes Absolute 11/02/2021 0.9* 1.0 - 5.1 K/uL Final    Monocytes Absolute 11/02/2021 0.3  0.0 - 1.3 K/uL Final    Eosinophils Absolute 11/02/2021 0.0  0.0 - 0.6 K/uL Final    Basophils Absolute 11/02/2021 0.0  0.0 - 0.2 K/uL Final    Sodium 11/02/2021 134* 136 - 145 mmol/L Final    Potassium 11/02/2021 4.8  3.5 - 5.1 mmol/L Final    Chloride 11/02/2021 97* 99 - 110 mmol/L Final    CO2 11/02/2021 25  21 - 32 mmol/L Final    Anion Gap 11/02/2021 12  3 - 16 Final    Glucose 11/02/2021 663* 70 - 99 mg/dL Final    BUN 11/02/2021 19  7 - 20 mg/dL Final    CREATININE 11/02/2021 1.1  0.6 - 1.2 mg/dL Final    GFR Non- 11/02/2021 50* >60 Final    GFR  11/02/2021 >60  >60 Final    Calcium 11/02/2021 9.2  8.3 - 10.6 mg/dL Final    Total Protein 11/02/2021 7.2  6.4 - 8.2 g/dL Final    Albumin 11/02/2021 3.6  3.4 - 5.0 g/dL Final    Albumin/Globulin Ratio 11/02/2021 1.0* 1.1 - 2.2 Final    Total Bilirubin 11/02/2021 0.3  0.0 - 1.0 mg/dL Final    Alkaline Phosphatase 11/02/2021 154* 40 - 129 U/L Final    ALT 11/02/2021 67* 10 - 40 U/L Final    AST 11/02/2021 43* 15 - 37 U/L Final    Troponin 11/02/2021 <0.01  <0.01 ng/mL Final    Lactic Acid 11/02/2021 2.2* 0.4 - 2.0 mmol/L Final    pH, Kyle 11/02/2021 7.286* 7.350 - 7.450 Final    pCO2, Kyle 11/02/2021 49.9 40.0 - 50.0 mmHg Final    pO2, Kyle 11/02/2021 46.8* 25.0 - 40.0 mmHg Final    HCO3, Venous 11/02/2021 23.2  23.0 - 29.0 mmol/L Final    Base Excess, Kyle 11/02/2021 -3.6* -3.0 - 3.0 mmol/L Final    O2 Sat, Kyle 11/02/2021 80  Not Established % Final    Carboxyhemoglobin 11/02/2021 2.0* 0.0 - 1.5 % Final    MetHgb, Kyle 11/02/2021 0.4  <1.5 % Final    TC02 (Calc), Kyle 11/02/2021 25  Not Established mmol/L Final    O2 Therapy 11/02/2021 Unknown   Final    Urine Culture, Routine 11/02/2021 <10,000 CFU/ml mixed skin/urogenital katherine.  No further workup   Final    Color, UA 11/02/2021 Yellow  Straw/Yellow Final    Clarity, UA 11/02/2021 Clear  Clear Final    Glucose, Ur 11/02/2021 >=1000* Negative mg/dL Final    Bilirubin Urine 11/02/2021 Negative  Negative Final    Ketones, Urine 11/02/2021 Negative  Negative mg/dL Final    Specific [place], UA 11/02/2021 1.015  1.005 - 1.030 Final    Blood, Urine 11/02/2021 Negative  Negative Final    pH, UA 11/02/2021 5.5  5.0 - 8.0 Final    Protein, UA 11/02/2021 Negative  Negative mg/dL Final    Urobilinogen, Urine 11/02/2021 0.2  <2.0 E.U./dL Final    Nitrite, Urine 11/02/2021 Negative  Negative Final    Leukocyte Esterase, Urine 11/02/2021 Negative  Negative Final    Microscopic Examination 11/02/2021 Not Indicated   Final    Urine Type 11/02/2021 NotGiven   Final    POC Glucose 11/02/2021 360* 70 - 99 mg/dl Final    Performed on 11/02/2021 ACCU-CHEK   Final    Troponin 11/02/2021 <0.01  <0.01 ng/mL Final    Troponin 11/02/2021 <0.01  <0.01 ng/mL Final    WBC 11/03/2021 5.2  4.0 - 11.0 K/uL Final    RBC 11/03/2021 3.58* 4.00 - 5.20 M/uL Final    Hemoglobin 11/03/2021 10.5* 12.0 - 16.0 g/dL Final    Hematocrit 11/03/2021 33.0* 36.0 - 48.0 % Final    MCV 11/03/2021 92.3  80.0 - 100.0 fL Final    MCH 11/03/2021 29.3  26.0 - 34.0 pg Final    MCHC 11/03/2021 31.8  31.0 - 36.0 g/dL Final    RDW 11/03/2021 12.4  12.4 - 15.4 % Final    Platelets 73/72/0865 131* 135 - 450 K/uL Final    MPV 11/03/2021 8.6  5.0 - 10.5 fL Final    Cholesterol, Total 11/03/2021 99  0 - 199 mg/dL Final    Triglycerides 11/03/2021 62  0 - 150 mg/dL Final    HDL 11/03/2021 55  40 - 60 mg/dL Final    LDL Calculated 11/03/2021 32  <100 mg/dL Final    VLDL Cholesterol Calculated 11/03/2021 12  Not Established mg/dL Final    Sodium 11/03/2021 139  136 - 145 mmol/L Final    Potassium reflex Magnesium 11/03/2021 4.6  3.5 - 5.1 mmol/L Final    Chloride 11/03/2021 105  99 - 110 mmol/L Final    CO2 11/03/2021 25  21 - 32 mmol/L Final    Anion Gap 11/03/2021 9  3 - 16 Final    Glucose 11/03/2021 167* 70 - 99 mg/dL Final    BUN 11/03/2021 15  7 - 20 mg/dL Final    CREATININE 11/03/2021 0.9  0.6 - 1.2 mg/dL Final    GFR Non- 11/03/2021 >60  >60 Final    GFR  11/03/2021 >60  >60 Final    Calcium 11/03/2021 8.6  8.3 - 10.6 mg/dL Final    Lactic Acid 11/02/2021 2.8* 0.4 - 2.0 mmol/L Final    POC Glucose 11/02/2021 250* 70 - 99 mg/dl Final    Performed on 11/02/2021 ACCU-CHEK   Final    Hemoglobin A1C 11/02/2021 11.3  See comment % Final    eAG 11/02/2021 277.6  mg/dL Final    POC Glucose 11/02/2021 325* 70 - 99 mg/dl Final    Performed on 11/02/2021 ACCU-CHEK   Final    POC Glucose 11/03/2021 48* 70 - 99 mg/dl Final    Performed on 11/03/2021 ACCU-CHEK   Final    POC Glucose 11/03/2021 114* 70 - 99 mg/dl Final    Performed on 11/03/2021 ACCU-CHEK   Final    Lactic Acid 11/03/2021 2.7* 0.4 - 2.0 mmol/L Final    POC Glucose 11/03/2021 52* 70 - 99 mg/dl Final    Performed on 11/03/2021 ACCU-CHEK   Final    POC Glucose 11/03/2021 100* 70 - 99 mg/dl Final    Performed on 11/03/2021 ACCU-CHEK   Final    POC Glucose 11/03/2021 175* 70 - 99 mg/dl Final    Performed on 11/03/2021 ACCU-CHEK   Final   Admission on 09/27/2021, Discharged on 09/29/2021   Component Date Value Ref Range Status    Ventricular Rate 09/27/2021 62  BPM Final    Atrial Rate 09/27/2021 62  BPM Final    P-R Interval 09/27/2021 150  ms Final    QRS Duration 09/27/2021 78  ms Final    Q-T Interval 09/27/2021 418  ms Final    QTc Calculation (Bazett) 09/27/2021 424  ms Final    P Axis 09/27/2021 57  degrees Final    R Axis 09/27/2021 -17  degrees Final    T Axis 09/27/2021 39  degrees Final    Diagnosis 09/27/2021 Normal sinus rhythmNormal ECGWhen compared with ECG of 08-AUG-2021 14:40,No significant change was foundConfirmed by Prema Ball MD, Jhony Fowler ([id number]) on 9/28/2021 7:28:41 PM   Final    WBC 09/27/2021 5.1  4.0 - 11.0 K/uL Final    RBC 09/27/2021 3.68* 4.00 - 5.20 M/uL Final    Hemoglobin 09/27/2021 10.6* 12.0 - 16.0 g/dL Final    Hematocrit 09/27/2021 32.2* 36.0 - 48.0 % Final    MCV 09/27/2021 87.4  80.0 - 100.0 fL Final    MCH 09/27/2021 28.8  26.0 - 34.0 pg Final    MCHC 09/27/2021 32.9  31.0 - 36.0 g/dL Final    RDW 09/27/2021 13.0  12.4 - 15.4 % Final    Platelets 16/17/6810 173  135 - 450 K/uL Final    MPV 09/27/2021 8.8  5.0 - 10.5 fL Final    Neutrophils % 09/27/2021 57.0  % Final    Lymphocytes % 09/27/2021 35.7  % Final    Monocytes % 09/27/2021 5.8  % Final    Eosinophils % 09/27/2021 0.9  % Final    Basophils % 09/27/2021 0.6  % Final    Neutrophils Absolute 09/27/2021 2.9  1.7 - 7.7 K/uL Final    Lymphocytes Absolute 09/27/2021 1.8  1.0 - 5.1 K/uL Final    Monocytes Absolute 09/27/2021 0.3  0.0 - 1.3 K/uL Final    Eosinophils Absolute 09/27/2021 0.0  0.0 - 0.6 K/uL Final    Basophils Absolute 09/27/2021 0.0  0.0 - 0.2 K/uL Final    Troponin 09/27/2021 <0.01  <0.01 ng/mL Final    Pro-BNP 09/27/2021 350* 0 - 124 pg/mL Final    SARS-CoV-2, NAAT 09/27/2021 Not Detected  Not Detected Final    pH, [facility] 09/27/2021 7.435  7.350 - 7.450 Final    pCO2, [facility] 09/27/2021 33.7* 40.0 - 50.0 mmHg Final    pO2, Kyle 09/27/2021 177.6* 25.0 - 40.0 mmHg Final    HCO3, Venous 09/27/2021 22.1* 23.0 - 29.0 mmol/L Final    Base Excess, [facility] 09/27/2021 -1.5  -3.0 - 3.0 mmol/L Final    O2 Sat, Kyle 09/27/2021 99  Not Established % Final    Carboxyhemoglobin 09/27/2021 4.9* 0.0 - 1.5 % Final    MetHgb, Kyle 09/27/2021 0.6  <1.5 % Final    TC02 (Calc), Kyle 09/27/2021 23  Not Established mmol/L Final    O2 Therapy 09/27/2021 Unknown   Final    Lactic Acid 09/28/2021 1.1  0.4 - 2.0 mmol/L Final    Sodium 09/27/2021 130* 136 - 145 mmol/L Final    Potassium 09/27/2021 4.6  3.5 - 5.1 mmol/L Final    Chloride 09/27/2021 97* 99 - 110 mmol/L Final    CO2 09/27/2021 24  21 - 32 mmol/L Final    Anion Gap 09/27/2021 9  3 - 16 Final    Glucose 09/27/2021 451* 70 - 99 mg/dL Final    BUN 09/27/2021 22* 7 - 20 mg/dL Final    CREATININE 09/27/2021 1.2  0.6 - 1.2 mg/dL Final    GFR Non- 09/27/2021 45* >60 Final    GFR  09/27/2021 55* >60 Final    Calcium 09/27/2021 9.6  8.3 - 10.6 mg/dL Final    Total Protein 09/27/2021 8.1  6.4 - 8.2 g/dL Final    Albumin 09/27/2021 4.3  3.4 - 5.0 g/dL Final    Albumin/Globulin Ratio 09/27/2021 1.1  1.1 - 2.2 Final    Total Bilirubin 09/27/2021 0.3  0.0 - 1.0 mg/dL Final    Alkaline Phosphatase 09/27/2021 166* 40 - 129 U/L Final    ALT 09/27/2021 50* 10 - 40 U/L Final    AST 09/27/2021 31  15 - 37 U/L Final    Globulin 09/27/2021 3.8  g/dL Final    Magnesium 09/27/2021 2.30  1.80 - 2.40 mg/dL Final    POC Glucose 09/27/2021 452* 70 - 99 mg/dl Final    Performed on 09/27/2021 ACCU-CHEK   Final    Color, UA 09/27/2021 Straw  Straw/Yellow Final    Clarity, UA 09/27/2021 SL CLOUDY* Clear Final    Glucose, Ur 09/27/2021 >=1000* Negative mg/dL Final    Bilirubin Urine 09/27/2021 Negative  Negative Final    Ketones, Urine 09/27/2021 Negative  Negative mg/dL Final    Specific [place], UA 09/27/2021 1.010  1.005 - 1.030 Final    Blood, Urine 09/27/2021 TRACE-INTACT* Negative Final    pH, UA 09/27/2021 5.5  5.0 - 8.0 Final    Protein, UA 09/27/2021 Negative  Negative mg/dL Final    Urobilinogen, Urine 09/27/2021 0.2  <2.0 E.U./dL Final    Nitrite, Urine 09/27/2021 Negative  Negative Final    Leukocyte Esterase, Urine 09/27/2021 Negative  Negative Final    Microscopic Examination 09/27/2021 YES   Final    Urine Type 09/27/2021 NotGiven   Final    Troponin 09/28/2021 <0.01  <0.01 ng/mL Final    WBC, UA 09/27/2021 6-9* 0 - 5 /HPF Final    RBC, UA 09/27/2021 3-4  0 - 4 /HPF Final    Epithelial Cells, UA 09/27/2021 2-5  0 - 5 /HPF Final    Bacteria, UA 09/27/2021 Rare* None Seen /HPF Final    Amorphous, UA 09/27/2021 Rare  /HPF Final    POC Glucose 09/28/2021 322* 70 - 99 mg/dl Final    Performed on 09/28/2021 ACCU-CHEK   Final    POC Glucose 09/28/2021 133* 70 - 99 mg/dl Final    Performed on 09/28/2021 ACCU-CHEK   Final    Occult Blood Screening 09/28/2021    Final                    Value:Result: Negative  Normal range: Negative      SARS-CoV-2, NAAT 09/28/2021 Not Detected  Not Detected Final    POC Glucose 09/28/2021 154* 70 - 99 mg/dl Final    Performed on 09/28/2021 ACCU-CHEK   Final    POC Glucose 09/28/2021 157* 70 - 99 mg/dl Final    Performed on 09/28/2021 ACCU-CHEK   Final    POC Glucose 09/28/2021 176* 70 - 99 mg/dl Final    Performed on 09/28/2021 ACCU-CHEK   Final    Sodium 09/29/2021 139  136 - 145 mmol/L Final    Potassium reflex Magnesium 09/29/2021 3.6  3.5 - 5.1 mmol/L Final    Chloride 09/29/2021 103  99 - 110 mmol/L Final    CO2 09/29/2021 26  21 - 32 mmol/L Final    Anion Gap 09/29/2021 10  3 - 16 Final    Glucose 09/29/2021 98  70 - 99 mg/dL Final    BUN 09/29/2021 13  7 - 20 mg/dL Final    CREATININE 09/29/2021 1.2  0.6 - 1.2 mg/dL Final    GFR Non- 09/29/2021 45* >60 Final    GFR  09/29/2021 55* >60 Final    Calcium 09/29/2021 9.5  8.3 - 10.6 mg/dL Final    Total Protein 09/29/2021 7.0  6.4 - 8.2 g/dL Final    Albumin 09/29/2021 3.8  3.4 - 5.0 g/dL Final    Albumin/Globulin Ratio 09/29/2021 1.2  1.1 - 2.2 Final    Total Bilirubin 09/29/2021 0.6  0.0 - 1.0 mg/dL Final    Alkaline Phosphatase 09/29/2021 138* 40 - 129 U/L Final    ALT 09/29/2021 39  10 - 40 U/L Final    AST 09/29/2021 27  15 - 37 U/L Final    Globulin 09/29/2021 3.2  g/dL Final    WBC 09/29/2021 5.4  4.0 - 11.0 K/uL Final    RBC 09/29/2021 3.83* 4.00 - 5.20 M/uL Final    Hemoglobin 09/29/2021 10.9* 12.0 - 16.0 g/dL Final    Hematocrit 09/29/2021 33.7* 36.0 - 48.0 % Final    MCV 09/29/2021 88.1  80.0 - 100.0 fL Final    MCH 09/29/2021 28.5  26.0 - 34.0 pg Final    MCHC 09/29/2021 32.3  31.0 - 36.0 g/dL Final    RDW 09/29/2021 13.3  12.4 - 15.4 % Final    Platelets 22/44/7177 161  135 - 450 K/uL Final    MPV 09/29/2021 8.7  5.0 - 10.5 fL Final    Neutrophils % 09/29/2021 37.6  % Final    Lymphocytes % 09/29/2021 50.9  % Final    Monocytes % 09/29/2021 9.6  % Final    Eosinophils % 09/29/2021 1.5  % Final    Basophils % 09/29/2021 0.4  % Final    Neutrophils Absolute 09/29/2021 2.0  1.7 - 7.7 K/uL Final    Lymphocytes Absolute 09/29/2021 2.7  1.0 - 5.1 K/uL Final    Monocytes Absolute 09/29/2021 0.5  0.0 - 1.3 K/uL Final    Eosinophils Absolute 09/29/2021 0.1  0.0 - 0.6 K/uL Final    Basophils Absolute 09/29/2021 0.0  0.0 - 0.2 K/uL Final    POC Glucose 09/28/2021 480* 70 - 99 mg/dl Final    Performed on 09/28/2021 ACCU-CHEK   Final    POC Glucose 09/28/2021 337* 70 - 99 mg/dl Final    Performed on 09/28/2021 ACCU-CHEK   Final    POC Glucose 09/29/2021 80  70 - 99 mg/dl Final    Performed on 09/29/2021 ACCU-CHEK   Final    POC Glucose 09/29/2021 132* 70 - 99 mg/dl Final    Performed on 09/29/2021 ACCU-CHEK   Final    Left Ventricular Ejection Fraction 09/29/2021 60   Final-Edited    LVEF MODALITY 09/29/2021 CATH   Final-Edited    Hemoglobin A1C 09/29/2021 14.4  See comment % Final    eAG 09/29/2021 366.6  mg/dL Final    POC Glucose 09/29/2021 414* 70 - 99 mg/dl Final    Performed on 09/29/2021 ACCU-CHEK   Final    POC ACT LR 09/29/2021 251  Not Established sec Final   No results displayed because visit has over 200 results. No results displayed because visit has over 200 results.       Admission on 05/18/2021, Discharged on 05/20/2021   Component Date Value Ref Range Status    Ventricular Rate 05/18/2021 59  BPM Final    Atrial Rate 05/18/2021 59  BPM Final    P-R Interval 05/18/2021 160  ms Final    QRS Duration 05/18/2021 82  ms Final    Q-T Interval 05/18/2021 454  ms Final    QTc Calculation (Bazett) 05/18/2021 449  ms Final    P Axis 05/18/2021 45  degrees Final    R Axis 05/18/2021 -24  degrees Final    T Axis 05/18/2021 59  degrees Final    Diagnosis 05/18/2021 Sinus bradycardiaOtherwise normal ECGWhen compared with ECG of 05-MAR-2021 20:33,No significant change was foundConfirmed by Ciarra Lyons ([id number]) on 5/20/2021 3:58:16 PM   Final    WBC 05/18/2021 4.6  4.0 - 11.0 K/uL Final    RBC 05/18/2021 3.84* 4.00 - 5.20 M/uL Final    Hemoglobin 05/18/2021 10.8* 12.0 - 16.0 g/dL Final    Hematocrit 05/18/2021 34.8* 36.0 - 48.0 % Final    MCV 05/18/2021 90.6  80.0 - 100.0 fL Final    MCH 05/18/2021 28.1  26.0 - 34.0 pg Final    MCHC 05/18/2021 31.1  31.0 - 36.0 g/dL Final    RDW 05/18/2021 13.5  12.4 - 15.4 % Final    Platelets 12/50/3765 160  135 - 450 K/uL Final    MPV 05/18/2021 10.1  5.0 - 10.5 fL Final    Neutrophils % 05/18/2021 58.4  % Final    Lymphocytes % 05/18/2021 34.8  % Final    Monocytes % 05/18/2021 5.6  % Final    Eosinophils % 05/18/2021 0.9  % Final    Basophils % 05/18/2021 0.3  % Final    Neutrophils Absolute 05/18/2021 2.7  1.7 - 7.7 K/uL Final    Lymphocytes Absolute 05/18/2021 1.6  1.0 - 5.1 K/uL Final    Monocytes Absolute 05/18/2021 0.3  0.0 - 1.3 K/uL Final    Eosinophils Absolute 05/18/2021 0.0  0.0 - 0.6 K/uL Final    Basophils Absolute 05/18/2021 0.0  0.0 - 0.2 K/uL Final    Sodium 05/18/2021 129* 136 - 145 mmol/L Final    Potassium 05/18/2021 4.8  3.5 - 5.1 mmol/L Final    Chloride 05/18/2021 93* 99 - 110 mmol/L Final    CO2 05/18/2021 24  21 - 32 mmol/L Final    Anion Gap 05/18/2021 12  3 - 16 Final    Glucose 05/18/2021 839* 70 - 99 mg/dL Final    BUN 05/18/2021 13  7 - 20 mg/dL Final    CREATININE 05/18/2021 1.6* 0.6 - 1.2 mg/dL Final    GFR Non- 05/18/2021 33* >60 Final    GFR  05/18/2021 39* >60 Final    Calcium 05/18/2021 9.3  8.3 - 10.6 mg/dL Final    Total Protein 05/18/2021 7.6  6.4 - 8.2 g/dL Final    Albumin 05/18/2021 3.7  3.4 - 5.0 g/dL Final    Albumin/Globulin Ratio 05/18/2021 0.9* 1.1 - 2.2 Final    Total Bilirubin 05/18/2021 <0.2  0.0 - 1.0 mg/dL Final    Alkaline Phosphatase 05/18/2021 198* 40 - 129 U/L Final    ALT 05/18/2021 34  10 - 40 U/L Final    AST 05/18/2021 26  15 - 37 U/L Final    Globulin 05/18/2021 3.9  g/dL Final    Troponin 05/18/2021 <0.01  <0.01 ng/mL Final    pH, Kyle 05/18/2021 7.499* 7.350 - 7.450 Final    pCO2, Kyle 05/18/2021 31.9* 40.0 - 50.0 mmHg Final    pO2, Kyle 05/18/2021 132.9* 25.0 - 40.0 mmHg Final    HCO3, Venous 05/18/2021 24.3  23.0 - 29.0 mmol/L Final    Base Excess, Kyle 05/18/2021 1.5  -3.0 - 3.0 mmol/L Final    O2 Sat, Kyle 05/18/2021 99  Not Established % Final    Carboxyhemoglobin 05/18/2021 5.2* 0.0 - 1.5 % Final    MetHgb, Kyle 05/18/2021 0.4  <1.5 % Final    TC02 (Calc), Kyle 05/18/2021 25  Not Established mmol/L Final    O2 Content, Kyle 05/18/2021 14  Not Established VOL % Final    O2 Therapy 05/18/2021 Unknown   Final    Color, UA 05/18/2021 Yellow  Straw/Yellow Final    Clarity, UA 05/18/2021 Clear  Clear Final    Glucose, Ur 05/18/2021 >=1000* Negative mg/dL Final    Bilirubin Urine 05/18/2021 Negative  Negative Final    Ketones, Urine 05/18/2021 Negative  Negative mg/dL Final    Specific Gravity, UA 05/18/2021 1.010  1.005 - 1.030 Final    Blood, Urine 05/18/2021 Negative  Negative Final    pH, UA 05/18/2021 6.0  5.0 - 8.0 Final    Protein, UA 05/18/2021 Negative  Negative mg/dL Final    Urobilinogen, Urine 05/18/2021 0.2  <2.0 E.U./dL Final    Nitrite, Urine 05/18/2021 Negative  Negative Final    Leukocyte Esterase, Urine 05/18/2021 Negative  Negative Final    Microscopic Examination 05/18/2021 Not Indicated   Final    Urine Type 05/18/2021 NotGiven   Final    Beta-Hydroxybutyrate 05/18/2021 0.19  0.00 - 0.27 mmol/L Final    POC Glucose 05/18/2021 >600* 70 - 99 mg/dl Final    Performed on 05/18/2021 ACCU-CHEK   Final    Glucose 05/18/2021   mg/dL Final    QC OK? 05/18/2021 yes   Final    POC Glucose 05/18/2021 >600* 70 - 99 mg/dl Final    Performed on 05/18/2021 ACCU-CHEK   Final    Sodium 05/19/2021 134* 136 - 145 mmol/L Final    Potassium reflex Magnesium 05/19/2021 4.2  3.5 - 5.1 mmol/L Final    Chloride 05/19/2021 100  99 - 110 mmol/L Final    CO2 05/19/2021 22  21 - 32 mmol/L Final    Anion Gap 05/19/2021 12  3 - 16 Final    Glucose 05/19/2021 582* 70 - 99 mg/dL Final    BUN 05/19/2021 12  7 - 20 mg/dL Final    CREATININE 05/19/2021 1.2  0.6 - 1.2 mg/dL Final    GFR Non- 05/19/2021 45* >60 Final    GFR  05/19/2021 55* >60 Final    Calcium 05/19/2021 8.8  8.3 - 10.6 mg/dL Final    Sodium 05/19/2021 141  136 - 145 mmol/L Final    Potassium reflex Magnesium 05/19/2021 3.7  3.5 - 5.1 mmol/L Final    Chloride 05/19/2021 105  99 - 110 mmol/L Final    CO2 05/19/2021 30  21 - 32 mmol/L Final    Anion Gap 05/19/2021 6  3 - 16 Final    Glucose 05/19/2021 255* 70 - 99 mg/dL Final    BUN 05/19/2021 10  7 - 20 mg/dL Final    CREATININE 05/19/2021 1.0  0.6 - 1.2 mg/dL Final    GFR Non- 05/19/2021 56* >60 Final    GFR  05/19/2021 >60  >60 Final    Calcium 05/19/2021 9.4  8.3 - 10.6 mg/dL Final    Troponin 05/19/2021 <0.01  <0.01 ng/mL Final    Troponin 05/19/2021 <0.01  <0.01 ng/mL Final    POC Glucose 05/19/2021 338* 70 - 99 mg/dl Final    Performed on 05/19/2021 ACCU-CHEK   Final    POC Glucose 05/19/2021 198* 70 - 99 mg/dl Final    Performed on 05/19/2021 ACCU-CHEK   Final    POC Glucose 05/19/2021 149* 70 - 99 mg/dl Final    Performed on 05/19/2021 ACCU-CHEK   Final    Left Ventricular Ejection Fraction 05/20/2021 53   Final-Edited    LVEF MODALITY 05/20/2021 ECHO   Final-Edited    Left Ventricular Ejection Fraction 05/20/2021 36   Final-Edited    LVEF MODALITY 05/20/2021 Nuclear   Final-Edited    POC Glucose 05/19/2021 51* 70 - 99 mg/dl Final    Performed on 05/19/2021 ACCU-CHEK   Final    POC Glucose 05/19/2021 59* 70 - 99 mg/dl Final    Performed on 05/19/2021 ACCU-CHEK   Final    POC Glucose 05/19/2021 67* 70 - 99 mg/dl Final    Performed on 05/19/2021 ACCU-CHEK   Final    POC Glucose 05/19/2021 154* 70 - 99 mg/dl Final    Performed on 05/19/2021 ACCU-CHEK   Final    POC Glucose 05/20/2021 91  70 - 99 mg/dl Final    Performed on 05/20/2021 ACCU-CHEK   Final    POC Glucose 05/20/2021 75  70 - 99 mg/dl Final    Performed on 05/20/2021 ACCU-CHEK   Final    POC Glucose 05/20/2021 183* 70 - 99 mg/dl Final    Performed on 05/20/2021 ACCU-CHEK   Final    POC Glucose 05/20/2021 248* 70 - 99 mg/dl Final    Performed on 05/20/2021 ACCU-CHEK   Final           Assessment and Plan     Twin Turcios was seen today for diabetes. Diagnoses and all orders for this visit:    Uncontrolled type 2 diabetes mellitus with microalbuminuria, with long-term current use of insulin (HCC)  -     insulin glargine (LANTUS SOLOSTAR) 100 UNIT/ML injection pen; Inject 32 Units into the skin every morning    Type 2 diabetes mellitus with other diabetic kidney complication (HCC)  -     insulin glargine (LANTUS SOLOSTAR) 100 UNIT/ML injection pen; Inject 32 Units into the skin every morning    Type 2 diabetes mellitus with diabetic nephropathy, with long-term current use of insulin (HCC)  -     insulin glargine (LANTUS SOLOSTAR) 100 UNIT/ML injection pen;  Inject 32 Units into the skin every morning    Uncontrolled type 2 diabetes mellitus with diabetic nephropathy, with long-term current use of insulin (HCC)  -     insulin glargine (LANTUS SOLOSTAR) 100 UNIT/ML injection pen; Inject 32 Units into the skin every morning          1: Type 2 DM complicated with nephropathy, neuropathy TIAs, carotid stenosis   Uncontrolled A1C 14.9% < 14.4%   < 15.5%   < 15.3% <  9.3% (after transfusion) < 11.9% < 7.3% < 8.4%<  6.8% < 10.1%  << 8.1 <  8% < 11.7%    Cr 1.3, GFR 46 - May 2022   A1C of <8 would be acceptable because of microvascular and macrovascular complications. Questionable adherence to medical plan, treatment adherence and diet plan   Ankita Portillo not approved      Need to bring meds on every visit    Bring meter every visit   Adherence to medications is questionable     Blood sugars not available   I gave her logs to complete with insulin doses     C/w Glimepiride 4 mg with breakfast or first meal of the day     Change Lantus to 32 units daily in am.  Victoza 1.8 mg daily in AM    Novolog SS                     With meals (during the day)   - at bedtime    < 150 ---     3 units                                 0 units  151-200 --  4 units                                 0 units  201-250 :    6 units                                 1 units  251-300:     7 units                                 2 units  301-350:     8 units                                 3 units  >350 :         9 units                                  4 units    Reiterated importance of taking care meds to control diabetes     All instructions provided in written. Check Blood sugars 4 times per day. Log them along with insulin and send them every 2 weeks. Call for blood sugars less than 60 or more than 400. Eye exam: Last exam in March 12th 2018, Needs an exam now.     Foot exam:  March 2020    Deformity/amputation: absent  Skin lesions/pre-ulcerative calluses: absent  Edema: right- negative, left- negative  Sensory exam: Monofilament sensation: normal  Plus at least one of the following:   Pulses: normal,   Vibration (128 Hz): Moderately decreased     Renal screen: High 68 Feb 2018, high 47 Jan 2019  > 40 June 2020     TSH screen: Feb 2022   Saw CDE in 2016 with Rd.     2: HTN   Controlled     3: Hyperlipidemia   LDL: 33, HDL: 39, TGs: 118 June 2020      Atorvastatin 10 mg daily     4: Unintentional weigh tloss - good appetite   TSH, FT4, am cortisol normal - Nov 2020    TSH, Free T4 normal Feb 2022   Following up with pcp     RTC in 6 weeks   [id number] Katarzyna leblanc double book     EDUCATION:   Greater than 50% of this follow-up visit was spent in general counseling regarding   obesity, diet, exercise, importance of adherence to insulin regime, recognition and treatment of hypo and hyperglycemia,  glucose logging, proper diabetes management, diabetic complications with poor management and the importance of glycemic control in order to avoid the complications of diabetes. Risks and potential complications of diabetes were reviewed with the patient. Diabetes health maintenance plan and follow-up were discussed and understood by the patient. We reviewed the importance of medication compliance and regular follow-up. Aggressive lifestyle modification was encouraged. Exercise Counselling: This patient is a candidate for regular physical exercise. Instructions to perform the following types of exercise:  Swimming or water aerobic exercise  Brisk walking  Playing tennis  Stationary bicycle or elliptical indoor  Low impact aerobic exercise    Instructions given to exercise for the following duration:  30 minutes a day for five-seven days per week.     Following instructions for being active throughout the day in addition to formal exercise:  Walk instead of drive whenever possible  Take the stairs instead of the elevator  Work in the garden  Park to the far end of the parking lot to add more walking steps to destination      Electronically signed by Zahida Stephenson MD on 5/11/2022 at 9:54 AM

## 2022-05-11 NOTE — PATIENT INSTRUCTIONS
C/w Glimepiride 4 mg with breakfast or first meal of the day     Change Lantus to 32 units daily in am.  Victoza 1.8 mg daily in AM    Novolog SS                     With meals (during the day)   - at bedtime    < 150 ---     3 units                                 0 units  151-200 --  4 units                                 0 units  201-250 :    6 units                                 1 units  251-300:     7 units                                 2 units  301-350:     8 units                                 3 units  >350 :         9 units                                  4 units

## 2022-05-18 ENCOUNTER — TELEPHONE (OUTPATIENT)
Dept: ENDOCRINOLOGY | Age: 62
End: 2022-05-18

## 2022-05-18 NOTE — TELEPHONE ENCOUNTER
Called Mrs. Fried back to give her sliding scale. Left a message to contact the office.     Novolog Sliding Scale                       With meals (during the day)   - at bedtime    < 150 ---     3 units                                 0 units  151-200 --  4 units                                 0 units  201-250 :    6 units                                 1 units  251-300:     7 units                                 2 units  301-350:     8 units                                 3 units  >350 :         9 units                                  4 units

## 2022-05-18 NOTE — TELEPHONE ENCOUNTER
She needs to speak to the nurse about her sliding scale. She cannot find hers.   Please call at [phone]

## 2022-05-19 NOTE — TELEPHONE ENCOUNTER
Gave patient sliding scale instructions over the phone. Patient wrote everything down and expressed understanding.

## 2022-05-20 DIAGNOSIS — E11.21 TYPE 2 DIABETES MELLITUS WITH DIABETIC NEPHROPATHY, WITH LONG-TERM CURRENT USE OF INSULIN (HCC): ICD-10-CM

## 2022-05-20 DIAGNOSIS — E11.29 TYPE 2 DIABETES MELLITUS WITH OTHER DIABETIC KIDNEY COMPLICATION (HCC): ICD-10-CM

## 2022-05-20 DIAGNOSIS — Z79.4 TYPE 2 DIABETES MELLITUS WITH DIABETIC NEPHROPATHY, WITH LONG-TERM CURRENT USE OF INSULIN (HCC): ICD-10-CM

## 2022-05-20 RX ORDER — GLIMEPIRIDE 4 MG/1
TABLET ORAL
Qty: 30 TABLET | Refills: 5 | Status: SHIPPED | OUTPATIENT
Start: 2022-05-20 | End: 2022-06-22

## 2022-05-20 RX ORDER — GLIMEPIRIDE 2 MG/1
TABLET ORAL
Qty: 30 TABLET | Refills: 5 | Status: SHIPPED | OUTPATIENT
Start: 2022-05-20 | End: 2022-06-22

## 2022-05-20 NOTE — TELEPHONE ENCOUNTER
Medication:   Requested Prescriptions     Pending Prescriptions Disp Refills    glimepiride (AMARYL) 2 MG tablet [Pharmacy Med Name: GLIMEPIRIDE 2 MG TABLET] 30 tablet 5     Sig: TAKE 1 TABLET BY MOUTH WITH BREAKFAST OR FIRST MEAL OF THE DAY    glimepiride (AMARYL) 4 MG tablet [Pharmacy Med Name: GLIMEPIRIDE 4 MG TABLET] 30 tablet 5     Sig: TAKE 1 TABLET BY MOUTH WITH BREAKFAST OR FIRST MEAL OF THE DAY       Last Filled:      Patient Phone Number: [phone] (home)     Last appt: 5/11/22   Next appt: 6/22/22    Last Labs DM:   Lab Results   Component Value Date    LABA1C 11.5 02/15/2022

## 2022-05-25 ENCOUNTER — HOSPITAL ENCOUNTER (OUTPATIENT)
Dept: MRI IMAGING | Age: 62
Discharge: HOME | End: 2022-05-25
Payer: COMMERCIAL

## 2022-05-25 DIAGNOSIS — G93.9 BRAIN LESION: ICD-10-CM

## 2022-05-25 PROCEDURE — 70553 MRI BRAIN STEM W/O & W/DYE: CPT

## 2022-05-25 PROCEDURE — A9579 GAD-BASE MR CONTRAST NOS,1ML: HCPCS | Performed by: NURSE PRACTITIONER

## 2022-05-25 PROCEDURE — 6360000004 HC RX CONTRAST MEDICATION: Performed by: NURSE PRACTITIONER

## 2022-05-25 RX ADMIN — GADOTERIDOL 10 ML: 279.3 INJECTION, SOLUTION INTRAVENOUS at 14:40

## 2022-06-22 ENCOUNTER — OFFICE VISIT (OUTPATIENT)
Dept: ENDOCRINOLOGY | Age: 62
End: 2022-06-22
Payer: COMMERCIAL

## 2022-06-22 VITALS
HEART RATE: 74 BPM | HEIGHT: 63 IN | BODY MASS INDEX: 19.6 KG/M2 | WEIGHT: 110.6 LBS | DIASTOLIC BLOOD PRESSURE: 67 MMHG | OXYGEN SATURATION: 100 % | SYSTOLIC BLOOD PRESSURE: 124 MMHG

## 2022-06-22 DIAGNOSIS — E11.29 TYPE 2 DIABETES MELLITUS WITH OTHER DIABETIC KIDNEY COMPLICATION (HCC): ICD-10-CM

## 2022-06-22 DIAGNOSIS — Z79.4 TYPE 2 DIABETES MELLITUS WITH DIABETIC NEPHROPATHY, WITH LONG-TERM CURRENT USE OF INSULIN (HCC): ICD-10-CM

## 2022-06-22 DIAGNOSIS — E11.21 TYPE 2 DIABETES MELLITUS WITH DIABETIC NEPHROPATHY, WITH LONG-TERM CURRENT USE OF INSULIN (HCC): ICD-10-CM

## 2022-06-22 PROCEDURE — 3017F COLORECTAL CA SCREEN DOC REV: CPT | Performed by: INTERNAL MEDICINE

## 2022-06-22 PROCEDURE — 2022F DILAT RTA XM EVC RTNOPTHY: CPT | Performed by: INTERNAL MEDICINE

## 2022-06-22 PROCEDURE — 1036F TOBACCO NON-USER: CPT | Performed by: INTERNAL MEDICINE

## 2022-06-22 PROCEDURE — 3046F HEMOGLOBIN A1C LEVEL >9.0%: CPT | Performed by: INTERNAL MEDICINE

## 2022-06-22 PROCEDURE — G8427 DOCREV CUR MEDS BY ELIG CLIN: HCPCS | Performed by: INTERNAL MEDICINE

## 2022-06-22 PROCEDURE — G8420 CALC BMI NORM PARAMETERS: HCPCS | Performed by: INTERNAL MEDICINE

## 2022-06-22 PROCEDURE — 99214 OFFICE O/P EST MOD 30 MIN: CPT | Performed by: INTERNAL MEDICINE

## 2022-06-22 NOTE — PATIENT INSTRUCTIONS
Stop Glimepiride 4 mg     Change Lantus 22 units daily in am.  Victoza 1.8 mg daily in AM    Change Novolog SS                     With meals (during the day)   - at bedtime    < 150 ---     5 units                                 0 units  151-200 --  6 units                                 0 units  201-250 :    7 units                                 1 units  251-300:     8 units                                 2 units  301-350:     9 units                                 3 units  >350 :         10 units                               4 units

## 2022-06-22 NOTE — PROGRESS NOTES
Patient ID:   Manuel Acosta is a 60 y.o. female  Chief Complaint:   Manuel Acosta presents for an evaluation of Type 2 Diabetes Mellitus , Hyperlipidemia and hypertension. Subjective:   Type 2 Diabetes Mellitus diagnosed around 2010  On insulin since 2012  Previous regimen:Taking Admelog 15 units tidac and 5 units for snacks. Basaglar 40 units once a day at night. VGO stopped due to hypoglycemias     Multiple hospitalizations in 2021 due to weakness, anemia   She is recently released from a nursing home     Not taking:   Synjardy 12.5-1000mg, two tabs in am. Last pick ups: Aug 2020 and Feb 2021. Admits making poor dietary choices, trying to cut down fried. Currently on  Glimepiride 4 mg with breakfast or first meal of the day   Lantus 22 units daily in am. She is was suppose to be on 32   Victoza 1.8 mg daily in AM    Novolog SS                     With meals (during the day)   - at bedtime    < 150 ---     3 units                                 0 units  151-200 --  4 units                                 0 units  201-250 :    6 units                                 1 units  251-300:     7 units                                 2 units  301-350:     8 units                                 3 units  >350 :         9 units                                  4 units    She thinks she is eating too much bread , pasta     Checks blood sugars 2-3 times per day. Reviewed     AM:     Lunch: 111-300  Supper:  041-135  HS:        Hypoglycemias: Once in last 4 weeks      Meals: 3, dinner is big. Snacks (jellos, fruits, pretzels) after every meal because she is hungry. Exercise: None    Denies chest pain, exertional dyspnea. Family history of CAD: Both parents  Denies smoking/ alcohol.         The following portions of the patient's history were reviewed and updated as appropriate:       Family History   Problem Relation Age of Onset    Cancer Mother         breast    Cancer Father     Heart Failure Neg Hx     High Cholesterol Neg Hx     Hypertension Neg Hx     Migraines Neg Hx     Rashes/Skin Problems Neg Hx     Seizures Neg Hx     Stroke Neg Hx     Thyroid Disease Neg Hx     Diabetes Neg Hx          Social History     Socioeconomic History    Marital status:      Spouse name: Not on file    Number of children: 1    Years of education: Not on file    Highest education level: Not on file   Occupational History    Occupation:    Tobacco Use    Smoking status: Former Smoker     Packs/day: 0.50     Years: 20.00     Pack years: 10.00     Types: Cigarettes, Cigars     Quit date: 7/3/2014     Years since quittin.9    Smokeless tobacco: Never Used   Vaping Use    Vaping Use: Never used   Substance and Sexual Activity    Alcohol use: No     Alcohol/week: 0.0 standard drinks    Drug use: No    Sexual activity: Not on file   Other Topics Concern    Not on file   Social History Narrative    Not on file     Social Determinants of Health     Financial Resource Strain:     Difficulty of Paying Living Expenses: Not on file   Food Insecurity:     Worried About Running Out of Food in the Last Year: Not on file    Tabby of Food in the Last Year: Not on file   Transportation Needs:     Lack of Transportation (Medical): Not on file    Lack of Transportation (Non-Medical):  Not on file   Physical Activity:     Days of Exercise per Week: Not on file    Minutes of Exercise per Session: Not on file   Stress:     Feeling of Stress : Not on file   Social Connections:     Frequency of Communication with Friends and Family: Not on file    Frequency of Social Gatherings with Friends and Family: Not on file    Attends Baptism Services: Not on file    Active Member of Clubs or Organizations: Not on file    Attends Club or Organization Meetings: Not on file    Marital Status: Not on file   Intimate Partner Violence:     Fear of Current or Ex-Partner: Not on file   [employer] Abused: Not on file    Physically Abused: Not on file    Sexually Abused: Not on file   Housing Stability:     Unable to Pay for Housing in the Last Year: Not on file    Number of Places Lived in the Last Year: Not on file    Unstable Housing in the Last Year: Not on file       Past Medical History:   Diagnosis Date    Abnormal brain MRI 7/20/2017    Partially empty sella and minimal chronic small vessel ischemic disease    Acute bilateral low back pain without sciatica 11/2/2016    SHARRON (acute kidney injury) ([facility] [address] 7/5/2017    Arthritis     back    Bipolar disorder ([address] 10/18/2008    CAD (coronary artery disease)     stent placed 6/8/20    Cancer [facility]) 2015    bilateral breast:s/p lumpectomy/radiation:under care care of breast specialist:Dr. Boone     Carotid stenosis, bilateral:<50%:per US 7/2016 7/15/2016    Carpal tunnel syndrome 10/18/2008    Cervical cancer screening 2014    Nml per pt'.  Coronary artery disease of native artery of native heart with stable angina pectoris ([facility] [address] 6/9/2020    DDD (degenerative disc disease), lumbar 7/18/2018    Depression     under care of pschiatrist:Dr. 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     Scoliosis     Spondylosis of lumbar region without myelopathy or radiculopathy 3/10/2017    Transient cerebral ischemia 07/15/2016    TIA:7/10/16    Unspecified cerebral artery occlusion with cerebral infarction     TIA       Past Surgical History:   Procedure Laterality Date    BREAST LUMPECTOMY  2015    Bilateral:breast cancer    CARDIAC CATHETERIZATION  06/08/2020    Dr. Christina Adkins), DAYANA to Diag 1    COLONOSCOPY N/A 2/1/2021    COLONOSCOPY DIAGNOSTIC performed by Remington Kelly MD at [address] [facility] [address] BONE MARROW BIOPSY  2/3/2021    CT BONE MARROW BIOPSY 2/3/2021 Evan Martínez MD [facility] CT SCAN    HYSTERECTOMY (CERVIX STATUS UNKNOWN)      Benign:no cervical cancer per pt'    KIDNEY REMOVAL      right    OTHER SURGICAL HISTORY Right     orif right ankle    TEMPORAL ARTERY BIOPSY Right 8/9/2021    RIGHT TEMPORAL ARTERY BIOPSY LIGATION performed by Enma Barajas MD at [address] N/A 1/29/2021    EGD BIOPSY performed by Remington Kelly MD at [address] [facility] [address]   Allergen Reactions    Morphine Anaphylaxis and Hives     feels like throat is closing    Penicillins Hives and Swelling    Codeine Hives and Rash    Penicillin G Rash         Current Outpatient Medications:     insulin glargine (LANTUS SOLOSTAR) 100 UNIT/ML injection pen, Inject 32 Units into the skin every morning, Disp: 5 pen, Rfl: 5    TRUE METRIX BLOOD GLUCOSE TEST strip, USE AS DIRECTED TO TEST 4 TIMES A DAY, Disp: 200 strip, Rfl: 5    lisinopril (PRINIVIL;ZESTRIL) 40 MG tablet, TAKE 1 TABLET BY MOUTH EVERY DAY, Disp: 30 tablet, Rfl: 5    atorvastatin (LIPITOR) 20 MG tablet, TAKE 1 TABLET BY MOUTH EVERY DAY, Disp: 30 tablet, Rfl: 5    ticagrelor (BRILINTA) 90 MG TABS tablet, TAKE 1 TABLET BY MOUTH TWICE A DAY, Disp: 60 tablet, Rfl: 11    Insulin Pen Needle 32G X 4 MM MISC, 1 each by Does not apply route daily, Disp: 100 each, Rfl: 3    VICTOZA 18 MG/3ML SOPN SC injection, INJECT 1.8 MG UNDER THE SKIN ONCE DAILY (Patient taking differently: Inject 1.8 mg into the skin daily ), Disp: 2 pen, Rfl: 5    Blood Glucose Monitoring Suppl (TRUE METRIX METER) w/Device KIT, Used to  check blood sugar 4 times eyad (Patient taking differently: Used to  check blood sugar 3 times eyad), Disp: 1 kit, Rfl: 1    nitroGLYCERIN (NITROSTAT) 0.4 MG SL tablet, up to max of 3 total doses. If no relief after 1 dose, call 911., Disp: 25 tablet, Rfl: 0    insulin lispro, 1 Unit Dial, (HUMALOG KWIKPEN) 100 UNIT/ML SOPN, Inject 8 Units into the skin 3 times daily , Disp: , Rfl:     pantoprazole (PROTONIX) 40 MG tablet, Take 40 mg by mouth daily , Disp: , Rfl:     ferrous sulfate (IRON 325) 325 (65 Fe) MG tablet, Take 325 mg by mouth daily (with breakfast), Disp: , Rfl:     oxyCODONE-acetaminophen (PERCOCET) 7.5-325 MG per tablet, TAKE 1 TABLET BY MOUTH EVERY 6 (SIX) HOURS AS NEEDED FOR UP TO 28 DAYS., Disp: , Rfl:     clonazePAM (KLONOPIN) 0.5 MG tablet, Take 0.5 mg by mouth 3 times daily as needed. , Disp: , Rfl:     calcium carbonate-vitamin D (CALTRATE) 600-400 MG-UNIT TABS per tab, Take 1 tablet by mouth daily , Disp: , Rfl:     aspirin 81 MG tablet, Take 81 mg by mouth daily, Disp: , Rfl:     traZODone (DESYREL) 100 MG tablet, Take 200 mg by mouth nightly , Disp: , Rfl:     gabapentin (NEURONTIN) 600 MG tablet, Take 0.5 tablets by mouth 2 times daily for 3 days. (Patient taking differently: Take 600 mg by mouth 3 times daily. ), Disp: 90 tablet, Rfl: 3    paliperidone (INVEGA) 9 MG extended release tablet, Take 9 mg by mouth every morning  (Patient not taking: Reported on 6/22/2022), Disp: , Rfl:       Review of Systems:    Constitutional: Negative for fever, chills, and unexpected weight change. HENT: Negative for congestion, ear pain, rhinorrhea,  sore throat and trouble swallowing. Eyes: Negative for photophobia, redness, itching. Respiratory: Negative for cough, shortness of breath and sputum. Cardiovascular: Negative for chest pain, palpitations and leg swelling.    Gastrointestinal: Negative for nausea, vomiting, abdominal pain, diarrhea, constipation. Endocrine: Negative for cold intolerance, heat intolerance, polydipsia, polyphagia and polyuria. Genitourinary: Negative for dysuria, urgency, frequency, hematuria and flank pain. Musculoskeletal: Negative for myalgias, back pain, arthralgias and neck pain. Skin/Nail: Negative for rash, itching. Normal nails. Neurological: Negative for seizures, weakness, light-headedness, numbness and headaches. Hematological/ Lymph nodes: Negative for adenopathy. Does not bruise/bleed easily. Psychiatric/Behavioral: Negative for suicidal ideas, depression, anxiety, sleep disturbance and decreased concentration. Objective:   Physical Exam:  /67 (Site: Left Upper Arm, Position: Sitting, Cuff Size: Medium Adult)   Pulse 74   Ht 5' 3\" (1.6 m)   Wt 110 lb 9.6 oz (50.2 kg)   SpO2 100%   BMI 19.59 kg/m²     Constitutional: Patient is oriented to person, place, and time. Patient appears well-developed and well-nourished. HENT:               NDRF: Normocephalic and atraumatic.                Eyes: Conjunctivae and EOM are normal.                Neck: Normal range of motion. Cardiovascular: Normal rate, regular rhythm and normal heart sounds.  heart murmur present. Pulmonary/Chest: Effort normal and breath sounds normal.      Neurological: Patient is alert and oriented to person, place, and time. Skin: Skin is warm and dry. Psychiatric: Patient has a normal mood and affect.  Patient behavior is normal.     Lab Review:    Admission on 03/31/2022, Discharged on 03/31/2022   Component Date Value Ref Range Status    Color, UA 03/31/2022 Yellow  Straw/Yellow Final    Clarity, UA 03/31/2022 Clear  Clear Final    Glucose, Ur 03/31/2022 >=1000* Negative mg/dL Final    Bilirubin Urine 03/31/2022 Negative  Negative Final    Ketones, Urine 03/31/2022 Negative  Negative mg/dL Final    Specific Gravity, UA 03/31/2022 <=1.005  1.005 - 1.030 Final    Blood, Urine 03/31/2022 Negative  Negative Final    pH, UA 03/31/2022 6.0  5.0 - 8.0 Final    Protein, UA 03/31/2022 Negative  Negative mg/dL Final    Urobilinogen, Urine 03/31/2022 0.2  <2.0 E.U./dL Final    Nitrite, Urine 03/31/2022 Negative  Negative Final    Leukocyte Esterase, Urine 03/31/2022 Negative  Negative Final    Microscopic Examination 03/31/2022 Not Indicated   Final    Urine Type 03/31/2022 NotGiven   Final    WBC, UA 03/31/2022 None seen  0 - 5 /HPF Final    RBC, UA 03/31/2022 None seen  0 - 4 /HPF Final    Renal Epithelial, UA 03/31/2022 0-1  0 - 1 /HPF Final    POC Glucose 03/31/2022 >600* 70 - 99 mg/dl Final    Performed on 03/31/2022 ACCU-CHEK   Final    POC Glucose 03/31/2022 >600* 70 - 99 mg/dl Final    Performed on 03/31/2022 ACCU-CHEK   Final    WBC 03/31/2022 5.4  4.0 - 11.0 K/uL Final    RBC 03/31/2022 3.61* 4.00 - 5.20 M/uL Final    Hemoglobin 03/31/2022 10.1* 12.0 - 16.0 g/dL Final    Hematocrit 03/31/2022 31.5* 36.0 - 48.0 % Final    MCV 03/31/2022 87.3  80.0 - 100.0 fL Final    MCH 03/31/2022 28.0  26.0 - 34.0 pg Final    MCHC 03/31/2022 32.1  31.0 - 36.0 g/dL Final    RDW 03/31/2022 12.9  12.4 - 15.4 % Final    Platelets 46/69/2206 176  135 - 450 K/uL Final    MPV 03/31/2022 10.1  5.0 - 10.5 fL Final    Neutrophils % 03/31/2022 65.2  % Final    Lymphocytes % 03/31/2022 28.0  % Final    Monocytes % 03/31/2022 6.1  % Final    Eosinophils % 03/31/2022 0.4  % Final    Basophils % 03/31/2022 0.3  % Final    Neutrophils Absolute 03/31/2022 3.5  1.7 - 7.7 K/uL Final    Lymphocytes Absolute 03/31/2022 1.5  1.0 - 5.1 K/uL Final    Monocytes Absolute 03/31/2022 0.3  0.0 - 1.3 K/uL Final    Eosinophils Absolute 03/31/2022 0.0  0.0 - 0.6 K/uL Final    Basophils Absolute 03/31/2022 0.0  0.0 - 0.2 K/uL Final    Sodium 03/31/2022 129* 136 - 145 mmol/L Final    Potassium reflex Magnesium 03/31/2022 5.0  3.5 - 5.1 mmol/L Final    Chloride 03/31/2022 92* 99 - 110 mmol/L Final    CO2 03/31/2022 28  21 - 32 mmol/L Final    Anion Gap 03/31/2022 9  3 - 16 Final    Glucose 03/31/2022 668* 70 - 99 mg/dL Final    BUN 03/31/2022 21* 7 - 20 mg/dL Final    CREATININE 03/31/2022 1.6* 0.6 - 1.2 mg/dL Final    GFR Non- 03/31/2022 33* >60 Final    GFR  03/31/2022 39* >60 Final    Calcium 03/31/2022 9.5  8.3 - 10.6 mg/dL Final    Total Protein 03/31/2022 7.6  6.4 - 8.2 g/dL Final    Albumin 03/31/2022 3.9  3.4 - 5.0 g/dL Final    Albumin/Globulin Ratio 03/31/2022 1.1  1.1 - 2.2 Final    Total Bilirubin 03/31/2022 0.4  0.0 - 1.0 mg/dL Final    Alkaline Phosphatase 03/31/2022 534* 40 - 129 U/L Final    ALT 03/31/2022 92* 10 - 40 U/L Final    AST 03/31/2022 36  15 - 37 U/L Final    Beta-Hydroxybutyrate 03/31/2022 0.44* 0.00 - 0.27 mmol/L Final    Magnesium 03/31/2022 2.10  1.80 - 2.40 mg/dL Final    Phosphorus 03/31/2022 3.8  2.5 - 4.9 mg/dL Final    pH, Kyle 03/31/2022 7.355  7.350 - 7.450 Final    pCO2, Kyle 03/31/2022 53.5* 40.0 - 50.0 mmHg Final    pO2, Kyle 03/31/2022 35.9  25.0 - 40.0 mmHg Final    HCO3, Venous 03/31/2022 29.2* 23.0 - 29.0 mmol/L Final    Base Excess, Kyle 03/31/2022 2.8  -3.0 - 3.0 mmol/L Final    O2 Sat, Kyle 03/31/2022 68  Not Established % Final    Carboxyhemoglobin 03/31/2022 1.5  0.0 - 1.5 % Final    MetHgb, Kyle 03/31/2022 0.5  <1.5 % Final    TC02 (Calc), Kyle 03/31/2022 31  Not Established mmol/L Final    O2 Therapy 03/31/2022 Unknown   Final    Ventricular Rate 03/31/2022 54  BPM Final    Atrial Rate 03/31/2022 54  BPM Final    P-R Interval 03/31/2022 202  ms Final    QRS Duration 03/31/2022 76  ms Final    Q-T Interval 03/31/2022 444  ms Final    QTc Calculation (Bazett) 03/31/2022 421  ms Final    P Axis 03/31/2022 66  degrees Final    R Axis 03/31/2022 -15  degrees Final    T Axis 03/31/2022 60  degrees Final    Diagnosis 03/31/2022 Sinus bradycardiaOtherwise normal ECGWhen compared with ECG of 14-FEB-2022 15:38,No significant change was foundConfirmed by Jason Vickers ([id number]) on 4/1/2022 4:18:11 PM   Final    POC Glucose 03/31/2022 446* 70 - 99 mg/dl Final    Performed on 03/31/2022 ACCU-CHEK   Final    POC Glucose 03/31/2022 356* 70 - 99 mg/dl Final    Performed on 03/31/2022 ACCU-CHEK   Final   Admission on 02/14/2022, Discharged on 02/16/2022   Component Date Value Ref Range Status    POC Glucose 02/14/2022 428* 70 - 99 mg/dl Final    Performed on 02/14/2022 ACCU-CHEK   Final    WBC 02/14/2022 5.4  4.0 - 11.0 K/uL Final    RBC 02/14/2022 3.90* 4.00 - 5.20 M/uL Final    Hemoglobin 02/14/2022 11.1* 12.0 - 16.0 g/dL Final    Hematocrit 02/14/2022 33.9* 36.0 - 48.0 % Final    MCV 02/14/2022 86.9  80.0 - 100.0 fL Final    MCH 02/14/2022 28.4  26.0 - 34.0 pg Final    MCHC 02/14/2022 32.7  31.0 - 36.0 g/dL Final    RDW 02/14/2022 12.4  12.4 - 15.4 % Final    Platelets 42/82/7483 136  135 - 450 K/uL Final    MPV 02/14/2022 9.7  5.0 - 10.5 fL Final    Neutrophils % 02/14/2022 65.6  % Final    Lymphocytes % 02/14/2022 27.8  % Final    Monocytes % 02/14/2022 5.4  % Final    Eosinophils % 02/14/2022 0.7  % Final    Basophils % 02/14/2022 0.5  % Final    Neutrophils Absolute 02/14/2022 3.6  1.7 - 7.7 K/uL Final    Lymphocytes Absolute 02/14/2022 1.5  1.0 - 5.1 K/uL Final    Monocytes Absolute 02/14/2022 0.3  0.0 - 1.3 K/uL Final    Eosinophils Absolute 02/14/2022 0.0  0.0 - 0.6 K/uL Final    Basophils Absolute 02/14/2022 0.0  0.0 - 0.2 K/uL Final    Sodium 02/14/2022 136  136 - 145 mmol/L Final    Potassium 02/14/2022 4.4  3.5 - 5.1 mmol/L Final    Chloride 02/14/2022 97* 99 - 110 mmol/L Final    CO2 02/14/2022 27  21 - 32 mmol/L Final    Anion Gap 02/14/2022 12  3 - 16 Final    Glucose 02/14/2022 417* 70 - 99 mg/dL Final    BUN 02/14/2022 19  7 - 20 mg/dL Final    CREATININE 02/14/2022 1.1  0.6 - 1.2 mg/dL Final    GFR Non- 02/14/2022 50* >60 Final    GFR  02/14/2022 >60  >60 Final    Calcium 02/14/2022 9.5  8.3 - 10.6 mg/dL Final    Total Protein 02/14/2022 7.0  6.4 - 8.2 g/dL Final    Albumin 02/14/2022 4.0  3.4 - 5.0 g/dL Final    Albumin/Globulin Ratio 02/14/2022 1.3  1.1 - 2.2 Final    Total Bilirubin 02/14/2022 <0.2  0.0 - 1.0 mg/dL Final    Alkaline Phosphatase 02/14/2022 215* 40 - 129 U/L Final    ALT 02/14/2022 66* 10 - 40 U/L Final    AST 02/14/2022 31  15 - 37 U/L Final    Sed Rate 02/14/2022 49* 0 - 30 mm/Hr Final    CRP 02/14/2022 <3.0  0.0 - 5.1 mg/L Final    Troponin 02/14/2022 <0.01  <0.01 ng/mL Final    Ventricular Rate 02/14/2022 58  BPM Final    Atrial Rate 02/14/2022 58  BPM Final    P-R Interval 02/14/2022 158  ms Final    QRS Duration 02/14/2022 80  ms Final    Q-T Interval 02/14/2022 448  ms Final    QTc Calculation (Bazett) 02/14/2022 439  ms Final    P Axis 02/14/2022 80  degrees Final    R Axis 02/14/2022 -23  degrees Final    T Axis 02/14/2022 58  degrees Final    Diagnosis 02/14/2022 Sinus bradycardiaOtherwise normal ECGWhen compared with ECG of 02-NOV-2021 12:42,No significant change was foundConfirmed by Prema Ball MD, Jhony Fowler ([id number]) on 2/14/2022 6:56:54 PM   Final    pH, Kyle 02/14/2022 7.435  7.350 - 7.450 Final    pCO2, Kyle 02/14/2022 38.9* 40.0 - 50.0 mmHg Final    pO2, Kyle 02/14/2022 55.2* 25.0 - 40.0 mmHg Final    HCO3, Venous 02/14/2022 25.5  23.0 - 29.0 mmol/L Final    Base Excess, Kyle 02/14/2022 1.3  -3.0 - 3.0 mmol/L Final    O2 Sat, Kyle 02/14/2022 90  Not Established % Final    Carboxyhemoglobin 02/14/2022 4.5* 0.0 - 1.5 % Final    MetHgb, Kyle 02/14/2022 0.3  <1.5 % Final    TC02 (Calc), Kyle 02/14/2022 27  Not Established mmol/L Final    O2 Therapy 02/14/2022 Unknown   Final    Specimen Status 02/14/2022 KIMMY   Final    Color, UA 02/14/2022 Yellow  Straw/Yellow Final    Clarity, UA 02/14/2022 Clear  Clear Final    Glucose, Ur 02/14/2022 >=1000* Negative mg/dL Final    Bilirubin Urine 02/14/2022 Negative Negative Final    Ketones, Urine 02/14/2022 Negative  Negative mg/dL Final    Specific Gravity, UA 02/14/2022 1.015  1.005 - 1.030 Final    Blood, Urine 02/14/2022 TRACE-INTACT* Negative Final    pH, UA 02/14/2022 6.5  5.0 - 8.0 Final    Protein, UA 02/14/2022 30* Negative mg/dL Final    Urobilinogen, Urine 02/14/2022 0.2  <2.0 E.U./dL Final    Nitrite, Urine 02/14/2022 Negative  Negative Final    Leukocyte Esterase, Urine 02/14/2022 Negative  Negative Final    Microscopic Examination 02/14/2022 YES   Final    Urine Type 02/14/2022 NotGiven   Final    WBC, UA 02/14/2022 0-2  0 - 5 /HPF Final    RBC, UA 02/14/2022 3-4  0 - 4 /HPF Final    Epithelial Cells, UA 02/14/2022 11-20* 0 - 5 /HPF Final    Bacteria, UA 02/14/2022 Rare* None Seen /HPF Final    Amphetamine Screen, Urine 02/14/2022 Neg  Negative <1000ng/mL Final    Barbiturate Screen, Ur 02/14/2022 Neg  Negative <200 ng/mL Final    Benzodiazepine Screen, Urine 02/14/2022 Neg  Negative <200 ng/mL Final    Cannabinoid Scrn, Ur 02/14/2022 Neg  Negative <50 ng/mL Final    Cocaine Metabolite Screen, Urine 02/14/2022 Neg  Negative <300 ng/mL Final    Opiate Scrn, Ur 02/14/2022 Neg  Negative <300 ng/mL Final    PCP Screen, Urine 02/14/2022 Neg  Negative <25 ng/mL Final    Methadone Screen, Urine 02/14/2022 Neg  Negative <300 ng/mL Final    Propoxyphene Scrn, Ur 02/14/2022 Neg  Negative <300 ng/mL Final    Oxycodone Urine 02/14/2022 POSITIVE* Negative <100 ng/ml Final    pH, UA 02/14/2022 6.5   Final    Drug Screen Comment: 02/14/2022 see below   Final    POC Glucose 02/14/2022 268* 70 - 99 mg/dl Final    Performed on 02/14/2022 ACCU-CHEK   Final    Hemoglobin A1C 02/15/2022 11.5  See comment % Final    eAG 02/15/2022 283.4  mg/dL Final    POC Glucose 02/15/2022 45* 70 - 99 mg/dl Final    Performed on 02/15/2022 ACCU-CHEK   Final    POC Glucose 02/15/2022 60* 70 - 99 mg/dl Final    Performed on 02/15/2022 ACCU-CHEK   Final    POC Glucose 02/15/2022 95  70 - 99 mg/dl Final    Performed on 02/15/2022 ACCU-CHEK   Final    POC Glucose 02/15/2022 340* 70 - 99 mg/dl Final    Performed on 02/15/2022 ACCU-CHEK   Final    POC Glucose 02/15/2022 429* 70 - 99 mg/dl Final    Performed on 02/15/2022 ACCU-CHEK   Final    POC Glucose 02/15/2022 41* 70 - 99 mg/dl Final    Performed on 02/15/2022 ACCU-CHEK   Final    POC Glucose 02/15/2022 301* 70 - 99 mg/dl Final    Performed on 02/15/2022 ACCU-CHEK   Final    POC Glucose 02/15/2022 281* 70 - 99 mg/dl Final    Performed on 02/15/2022 ACCU-CHEK   Final    POC Glucose 02/15/2022 122* 70 - 99 mg/dl Final    Performed on 02/15/2022 ACCU-CHEK   Final    WBC 02/16/2022 3.7* 4.0 - 11.0 K/uL Final    RBC 02/16/2022 3.99* 4.00 - 5.20 M/uL Final    Hemoglobin 02/16/2022 11.4* 12.0 - 16.0 g/dL Final    Hematocrit 02/16/2022 35.2* 36.0 - 48.0 % Final    MCV 02/16/2022 88.2  80.0 - 100.0 fL Final    MCH 02/16/2022 28.6  26.0 - 34.0 pg Final    MCHC 02/16/2022 32.4  31.0 - 36.0 g/dL Final    RDW 02/16/2022 12.8  12.4 - 15.4 % Final    Platelets 78/07/9133 116* 135 - 450 K/uL Final    MPV 02/16/2022 9.8  5.0 - 10.5 fL Final    Neutrophils % 02/16/2022 49.7  % Final    Lymphocytes % 02/16/2022 41.4  % Final    Monocytes % 02/16/2022 7.6  % Final    Eosinophils % 02/16/2022 1.1  % Final    Basophils % 02/16/2022 0.2  % Final    Neutrophils Absolute 02/16/2022 1.8  1.7 - 7.7 K/uL Final    Lymphocytes Absolute 02/16/2022 1.5  1.0 - 5.1 K/uL Final    Monocytes Absolute 02/16/2022 0.3  0.0 - 1.3 K/uL Final    Eosinophils Absolute 02/16/2022 0.0  0.0 - 0.6 K/uL Final    Basophils Absolute 02/16/2022 0.0  0.0 - 0.2 K/uL Final    Sodium 02/16/2022 133* 136 - 145 mmol/L Final    Potassium 02/16/2022 5.0  3.5 - 5.1 mmol/L Final    Chloride 02/16/2022 99  99 - 110 mmol/L Final    CO2 02/16/2022 23  21 - 32 mmol/L Final    Anion Gap 02/16/2022 11  3 - 16 Final    Glucose 02/16/2022 274* 70 - 99 mg/dL Final    BUN 02/16/2022 15  7 - 20 mg/dL Final    CREATININE 02/16/2022 1.3* 0.6 - 1.2 mg/dL Final    GFR Non- 02/16/2022 41* >60 Final    GFR  02/16/2022 50* >60 Final    Calcium 02/16/2022 8.6  8.3 - 10.6 mg/dL Final    Magnesium 02/16/2022 2.00  1.80 - 2.40 mg/dL Final    POC Glucose 02/15/2022 230* 70 - 99 mg/dl Final    Performed on 02/15/2022 ACCU-CHEK   Final    POC Glucose 02/16/2022 291* 70 - 99 mg/dl Final    Performed on 02/16/2022 ACCU-CHEK   Final    POC Glucose 02/16/2022 262* 70 - 99 mg/dl Final    Performed on 02/16/2022 ACCU-CHEK   Final    POC Glucose 02/16/2022 247* 70 - 99 mg/dl Final    Performed on 02/16/2022 ACCU-CHEK   Final    POC Glucose 02/16/2022 185* 70 - 99 mg/dl Final    Performed on 02/16/2022 ACCU-CHEK   Final    Rejected Test 02/16/2022 TSH FRT4   Final    Reason for Rejection 02/16/2022 see below   Final    TSH 02/16/2022 0.75  0.27 - 4.20 uIU/mL Final    T4 Free 02/16/2022 1.4  0.9 - 1.8 ng/dL Final   Admission on 11/22/2021, Discharged on 11/23/2021   Component Date Value Ref Range Status    POC Glucose 11/22/2021 384* 70 - 99 mg/dl Final    Performed on 11/22/2021 ACCU-CHEK   Final    WBC 11/22/2021 4.8  4.0 - 11.0 K/uL Final    RBC 11/22/2021 3.68* 4.00 - 5.20 M/uL Final    Hemoglobin 11/22/2021 10.7* 12.0 - 16.0 g/dL Final    Hematocrit 11/22/2021 33.2* 36.0 - 48.0 % Final    MCV 11/22/2021 90.4  80.0 - 100.0 fL Final    MCH 11/22/2021 29.1  26.0 - 34.0 pg Final    MCHC 11/22/2021 32.2  31.0 - 36.0 g/dL Final    RDW 11/22/2021 12.0* 12.4 - 15.4 % Final    Platelets 13/29/5626 149  135 - 450 K/uL Final    MPV 11/22/2021 8.8  5.0 - 10.5 fL Final    Neutrophils % 11/22/2021 69.8  % Final    Lymphocytes % 11/22/2021 20.0  % Final    Monocytes % 11/22/2021 9.6  % Final    Eosinophils % 11/22/2021 0.4  % Final    Basophils % 11/22/2021 0.2  % Final    Neutrophils Absolute 11/22/2021 3.3  1.7 - 7.7 K/uL Final  Lymphocytes Absolute 11/22/2021 1.0  1.0 - 5.1 K/uL Final    Monocytes Absolute 11/22/2021 0.5  0.0 - 1.3 K/uL Final    Eosinophils Absolute 11/22/2021 0.0  0.0 - 0.6 K/uL Final    Basophils Absolute 11/22/2021 0.0  0.0 - 0.2 K/uL Final    Sodium 11/22/2021 134* 136 - 145 mmol/L Final    Potassium reflex Magnesium 11/22/2021 3.8  3.5 - 5.1 mmol/L Final    Chloride 11/22/2021 98* 99 - 110 mmol/L Final    CO2 11/22/2021 26  21 - 32 mmol/L Final    Anion Gap 11/22/2021 10  3 - 16 Final    Glucose 11/22/2021 306* 70 - 99 mg/dL Final    BUN 11/22/2021 14  7 - 20 mg/dL Final    CREATININE 11/22/2021 1.1  0.6 - 1.2 mg/dL Final    GFR Non- 11/22/2021 50* >60 Final    GFR  11/22/2021 >60  >60 Final    Calcium 11/22/2021 9.3  8.3 - 10.6 mg/dL Final    Total Protein 11/22/2021 7.5  6.4 - 8.2 g/dL Final    Albumin 11/22/2021 3.6  3.4 - 5.0 g/dL Final    Albumin/Globulin Ratio 11/22/2021 0.9* 1.1 - 2.2 Final    Total Bilirubin 11/22/2021 <0.2  0.0 - 1.0 mg/dL Final    Alkaline Phosphatase 11/22/2021 170* 40 - 129 U/L Final    ALT 11/22/2021 57* 10 - 40 U/L Final    AST 11/22/2021 24  15 - 37 U/L Final    Troponin 11/22/2021 <0.01  <0.01 ng/mL Final    Pro-BNP 11/22/2021 248* 0 - 124 pg/mL Final    Rapid Influenza A Ag 11/22/2021 Negative  Negative Final    Rapid Influenza B Ag 11/22/2021 Negative  Negative Final    SARS-CoV-2, NAAT 11/22/2021 DETECTED* Not Detected Final    POC Glucose 11/22/2021 223* 70 - 99 mg/dl Final    Performed on 11/22/2021 ACCU-CHEK   Final   Admission on 11/02/2021, Discharged on 11/03/2021   Component Date Value Ref Range Status    Ventricular Rate 11/02/2021 78  BPM Final    Atrial Rate 11/02/2021 78  BPM Final    P-R Interval 11/02/2021 138  ms Final    QRS Duration 11/02/2021 74  ms Final    Q-T Interval 11/02/2021 376  ms Final    QTc Calculation (Bazett) 11/02/2021 428  ms Final    P Axis 11/02/2021 78  degrees Final    R Axis 11/02/2021 -40  degrees Final    T Axis 11/02/2021 72  degrees Final    Diagnosis 11/02/2021 Normal sinus rhythmLeft axis deviationAbnormal ECGWhen compared with ECG of 27-SEP-2021 19:47,No significant change was foundConfirmed by Claudean Holstein ([id number]) on 11/2/2021 3:11:19 PM   Final    WBC 11/02/2021 5.1  4.0 - 11.0 K/uL Final    RBC 11/02/2021 3.61* 4.00 - 5.20 M/uL Final    Hemoglobin 11/02/2021 10.6* 12.0 - 16.0 g/dL Final    Hematocrit 11/02/2021 33.4* 36.0 - 48.0 % Final    MCV 11/02/2021 92.5  80.0 - 100.0 fL Final    MCH 11/02/2021 29.3  26.0 - 34.0 pg Final    MCHC 11/02/2021 31.7  31.0 - 36.0 g/dL Final    RDW 11/02/2021 12.2* 12.4 - 15.4 % Final    Platelets 36/88/0376 140  135 - 450 K/uL Final    MPV 11/02/2021 8.7  5.0 - 10.5 fL Final    Neutrophils % 11/02/2021 75.5  % Final    Lymphocytes % 11/02/2021 17.3  % Final    Monocytes % 11/02/2021 6.1  % Final    Eosinophils % 11/02/2021 0.8  % Final    Basophils % 11/02/2021 0.3  % Final    Neutrophils Absolute 11/02/2021 3.9  1.7 - 7.7 K/uL Final    Lymphocytes Absolute 11/02/2021 0.9* 1.0 - 5.1 K/uL Final    Monocytes Absolute 11/02/2021 0.3  0.0 - 1.3 K/uL Final    Eosinophils Absolute 11/02/2021 0.0  0.0 - 0.6 K/uL Final    Basophils Absolute 11/02/2021 0.0  0.0 - 0.2 K/uL Final    Sodium 11/02/2021 134* 136 - 145 mmol/L Final    Potassium 11/02/2021 4.8  3.5 - 5.1 mmol/L Final    Chloride 11/02/2021 97* 99 - 110 mmol/L Final    CO2 11/02/2021 25  21 - 32 mmol/L Final    Anion Gap 11/02/2021 12  3 - 16 Final    Glucose 11/02/2021 663* 70 - 99 mg/dL Final    BUN 11/02/2021 19  7 - 20 mg/dL Final    CREATININE 11/02/2021 1.1  0.6 - 1.2 mg/dL Final    GFR Non- 11/02/2021 50* >60 Final    GFR  11/02/2021 >60  >60 Final    Calcium 11/02/2021 9.2  8.3 - 10.6 mg/dL Final    Total Protein 11/02/2021 7.2  6.4 - 8.2 g/dL Final    Albumin 11/02/2021 3.6  3.4 - 5.0 g/dL Final    Albumin/Globulin Ratio 11/02/2021 1.0* 1.1 - 2.2 Final    Total Bilirubin 11/02/2021 0.3  0.0 - 1.0 mg/dL Final    Alkaline Phosphatase 11/02/2021 154* 40 - 129 U/L Final    ALT 11/02/2021 67* 10 - 40 U/L Final    AST 11/02/2021 43* 15 - 37 U/L Final    Troponin 11/02/2021 <0.01  <0.01 ng/mL Final    Lactic Acid 11/02/2021 2.2* 0.4 - 2.0 mmol/L Final    pH, Kyle 11/02/2021 7.286* 7.350 - 7.450 Final    pCO2, Kyle 11/02/2021 49.9  40.0 - 50.0 mmHg Final    pO2, Kyle 11/02/2021 46.8* 25.0 - 40.0 mmHg Final    HCO3, Venous 11/02/2021 23.2  23.0 - 29.0 mmol/L Final    Base Excess, Kyle 11/02/2021 -3.6* -3.0 - 3.0 mmol/L Final    O2 Sat, Kyle 11/02/2021 80  Not Established % Final    Carboxyhemoglobin 11/02/2021 2.0* 0.0 - 1.5 % Final    MetHgb, Kyle 11/02/2021 0.4  <1.5 % Final    TC02 (Calc), Kyle 11/02/2021 25  Not Established mmol/L Final    O2 Therapy 11/02/2021 Unknown   Final    Urine Culture, Routine 11/02/2021 <10,000 CFU/ml mixed skin/urogenital katherine.  No further workup   Final    Color, UA 11/02/2021 Yellow  Straw/Yellow Final    Clarity, UA 11/02/2021 Clear  Clear Final    Glucose, Ur 11/02/2021 >=1000* Negative mg/dL Final    Bilirubin Urine 11/02/2021 Negative  Negative Final    Ketones, Urine 11/02/2021 Negative  Negative mg/dL Final    Specific [place], UA 11/02/2021 1.015  1.005 - 1.030 Final    Blood, Urine 11/02/2021 Negative  Negative Final    pH, UA 11/02/2021 5.5  5.0 - 8.0 Final    Protein, UA 11/02/2021 Negative  Negative mg/dL Final    Urobilinogen, Urine 11/02/2021 0.2  <2.0 E.U./dL Final    Nitrite, Urine 11/02/2021 Negative  Negative Final    Leukocyte Esterase, Urine 11/02/2021 Negative  Negative Final    Microscopic Examination 11/02/2021 Not Indicated   Final    Urine Type 11/02/2021 NotGiven   Final    POC Glucose 11/02/2021 360* 70 - 99 mg/dl Final    Performed on 11/02/2021 ACCU-CHEK   Final    Troponin 11/02/2021 <0.01  <0.01 ng/mL Final    Troponin 11/02/2021 <0.01  <0.01 ng/mL Final    WBC 11/03/2021 5.2  4.0 - 11.0 K/uL Final    RBC 11/03/2021 3.58* 4.00 - 5.20 M/uL Final    Hemoglobin 11/03/2021 10.5* 12.0 - 16.0 g/dL Final    Hematocrit 11/03/2021 33.0* 36.0 - 48.0 % Final    MCV 11/03/2021 92.3  80.0 - 100.0 fL Final    MCH 11/03/2021 29.3  26.0 - 34.0 pg Final    MCHC 11/03/2021 31.8  31.0 - 36.0 g/dL Final    RDW 11/03/2021 12.4  12.4 - 15.4 % Final    Platelets 77/43/8379 131* 135 - 450 K/uL Final    MPV 11/03/2021 8.6  5.0 - 10.5 fL Final    Cholesterol, Total 11/03/2021 99  0 - 199 mg/dL Final    Triglycerides 11/03/2021 62  0 - 150 mg/dL Final    HDL 11/03/2021 55  40 - 60 mg/dL Final    LDL Calculated 11/03/2021 32  <100 mg/dL Final    VLDL Cholesterol Calculated 11/03/2021 12  Not Established mg/dL Final    Sodium 11/03/2021 139  136 - 145 mmol/L Final    Potassium reflex Magnesium 11/03/2021 4.6  3.5 - 5.1 mmol/L Final    Chloride 11/03/2021 105  99 - 110 mmol/L Final    CO2 11/03/2021 25  21 - 32 mmol/L Final    Anion Gap 11/03/2021 9  3 - 16 Final    Glucose 11/03/2021 167* 70 - 99 mg/dL Final    BUN 11/03/2021 15  7 - 20 mg/dL Final    CREATININE 11/03/2021 0.9  0.6 - 1.2 mg/dL Final    GFR Non- 11/03/2021 >60  >60 Final    GFR  11/03/2021 >60  >60 Final    Calcium 11/03/2021 8.6  8.3 - 10.6 mg/dL Final    Lactic Acid 11/02/2021 2.8* 0.4 - 2.0 mmol/L Final    POC Glucose 11/02/2021 250* 70 - 99 mg/dl Final    Performed on 11/02/2021 ACCU-CHEK   Final    Hemoglobin A1C 11/02/2021 11.3  See comment % Final    eAG 11/02/2021 277.6  mg/dL Final    POC Glucose 11/02/2021 325* 70 - 99 mg/dl Final    Performed on 11/02/2021 ACCU-CHEK   Final    POC Glucose 11/03/2021 48* 70 - 99 mg/dl Final    Performed on 11/03/2021 ACCU-CHEK   Final    POC Glucose 11/03/2021 114* 70 - 99 mg/dl Final    Performed on 11/03/2021 ACCU-CHEK   Final    Lactic Acid 11/03/2021 2.7* 0.4 - 2.0 mmol/L Final    POC Glucose 11/03/2021 52* 70 - 99 mg/dl Final    Performed on 11/03/2021 ACCU-CHEK   Final    POC Glucose 11/03/2021 100* 70 - 99 mg/dl Final    Performed on 11/03/2021 ACCU-CHEK   Final    POC Glucose 11/03/2021 175* 70 - 99 mg/dl Final    Performed on 11/03/2021 ACCU-CHEK   Final   Admission on 09/27/2021, Discharged on 09/29/2021   Component Date Value Ref Range Status    Ventricular Rate 09/27/2021 62  BPM Final    Atrial Rate 09/27/2021 62  BPM Final    P-R Interval 09/27/2021 150  ms Final    QRS Duration 09/27/2021 78  ms Final    Q-T Interval 09/27/2021 418  ms Final    QTc Calculation (Bazett) 09/27/2021 424  ms Final    P Axis 09/27/2021 57  degrees Final    R Axis 09/27/2021 -17  degrees Final    T Axis 09/27/2021 39  degrees Final    Diagnosis 09/27/2021 Normal sinus rhythmNormal ECGWhen compared with ECG of 08-AUG-2021 14:40,No significant change was foundConfirmed by Eladio Retana MD, Kelly Friedman ([id number]) on 9/28/2021 7:28:41 PM   Final    WBC 09/27/2021 5.1  4.0 - 11.0 K/uL Final    RBC 09/27/2021 3.68* 4.00 - 5.20 M/uL Final    Hemoglobin 09/27/2021 10.6* 12.0 - 16.0 g/dL Final    Hematocrit 09/27/2021 32.2* 36.0 - 48.0 % Final    MCV 09/27/2021 87.4  80.0 - 100.0 fL Final    MCH 09/27/2021 28.8  26.0 - 34.0 pg Final    MCHC 09/27/2021 32.9  31.0 - 36.0 g/dL Final    RDW 09/27/2021 13.0  12.4 - 15.4 % Final    Platelets 41/94/8713 173  135 - 450 K/uL Final    MPV 09/27/2021 8.8  5.0 - 10.5 fL Final    Neutrophils % 09/27/2021 57.0  % Final    Lymphocytes % 09/27/2021 35.7  % Final    Monocytes % 09/27/2021 5.8  % Final    Eosinophils % 09/27/2021 0.9  % Final    Basophils % 09/27/2021 0.6  % Final    Neutrophils Absolute 09/27/2021 2.9  1.7 - 7.7 K/uL Final    Lymphocytes Absolute 09/27/2021 1.8  1.0 - 5.1 K/uL Final    Monocytes Absolute 09/27/2021 0.3  0.0 - 1.3 K/uL Final    Eosinophils Absolute 09/27/2021 0.0  0.0 - 0.6 K/uL Final    Basophils Absolute 09/27/2021 0.0  0.0 - 0.2 K/uL Final    Troponin 09/27/2021 <0.01  <0.01 ng/mL Final    Pro-BNP 09/27/2021 350* 0 - 124 pg/mL Final    SARS-CoV-2, NAAT 09/27/2021 Not Detected  Not Detected Final    pH, [facility] 09/27/2021 7.435  7.350 - 7.450 Final    pCO2, [facility] 09/27/2021 33.7* 40.0 - 50.0 mmHg Final    pO2, [facility] 09/27/2021 177.6* 25.0 - 40.0 mmHg Final    HCO3, Venous 09/27/2021 22.1* 23.0 - 29.0 mmol/L Final    Base Excess, Kyle 09/27/2021 -1.5  -3.0 - 3.0 mmol/L Final    O2 Sat, [facility] 09/27/2021 99  Not Established % Final    Carboxyhemoglobin 09/27/2021 4.9* 0.0 - 1.5 % Final    MetHgb, [facility] 09/27/2021 0.6  <1.5 % Final    TC02 (Calc), [facility] 09/27/2021 23  Not Established mmol/L Final    O2 Therapy 09/27/2021 Unknown   Final    Lactic Acid 09/28/2021 1.1  0.4 - 2.0 mmol/L Final    Sodium 09/27/2021 130* 136 - 145 mmol/L Final    Potassium 09/27/2021 4.6  3.5 - 5.1 mmol/L Final    Chloride 09/27/2021 97* 99 - 110 mmol/L Final    CO2 09/27/2021 24  21 - 32 mmol/L Final    Anion Gap 09/27/2021 9  3 - 16 Final    Glucose 09/27/2021 451* 70 - 99 mg/dL Final    BUN 09/27/2021 22* 7 - 20 mg/dL Final    CREATININE 09/27/2021 1.2  0.6 - 1.2 mg/dL Final    GFR Non- 09/27/2021 45* >60 Final    GFR  09/27/2021 55* >60 Final    Calcium 09/27/2021 9.6  8.3 - 10.6 mg/dL Final    Total Protein 09/27/2021 8.1  6.4 - 8.2 g/dL Final    Albumin 09/27/2021 4.3  3.4 - 5.0 g/dL Final    Albumin/Globulin Ratio 09/27/2021 1.1  1.1 - 2.2 Final    Total Bilirubin 09/27/2021 0.3  0.0 - 1.0 mg/dL Final    Alkaline Phosphatase 09/27/2021 166* 40 - 129 U/L Final    ALT 09/27/2021 50* 10 - 40 U/L Final    AST 09/27/2021 31  15 - 37 U/L Final    Globulin 09/27/2021 3.8  g/dL Final    Magnesium 09/27/2021 2.30  1.80 - 2.40 mg/dL Final    POC Glucose 09/27/2021 452* 70 - 99 mg/dl Final    Performed on 09/27/2021 ACCU-CHEK   Final    Color, UA 09/27/2021 Straw  Straw/Yellow Final    Clarity, UA 09/27/2021 SL CLOUDY* Clear Final    Glucose, Ur 09/27/2021 >=1000* Negative mg/dL Final    Bilirubin Urine 09/27/2021 Negative  Negative Final    Ketones, Urine 09/27/2021 Negative  Negative mg/dL Final    Specific [place], UA 09/27/2021 1.010  1.005 - 1.030 Final    Blood, Urine 09/27/2021 TRACE-INTACT* Negative Final    pH, UA 09/27/2021 5.5  5.0 - 8.0 Final    Protein, UA 09/27/2021 Negative  Negative mg/dL Final    Urobilinogen, Urine 09/27/2021 0.2  <2.0 E.U./dL Final    Nitrite, Urine 09/27/2021 Negative  Negative Final    Leukocyte Esterase, Urine 09/27/2021 Negative  Negative Final    Microscopic Examination 09/27/2021 YES   Final    Urine Type 09/27/2021 NotGiven   Final    Troponin 09/28/2021 <0.01  <0.01 ng/mL Final    WBC, UA 09/27/2021 6-9* 0 - 5 /HPF Final    RBC, UA 09/27/2021 3-4  0 - 4 /HPF Final    Epithelial Cells, UA 09/27/2021 2-5  0 - 5 /HPF Final    Bacteria, UA 09/27/2021 Rare* None Seen /HPF Final    Amorphous, UA 09/27/2021 Rare  /HPF Final    POC Glucose 09/28/2021 322* 70 - 99 mg/dl Final    Performed on 09/28/2021 ACCU-CHEK   Final    POC Glucose 09/28/2021 133* 70 - 99 mg/dl Final    Performed on 09/28/2021 ACCU-CHEK   Final    Occult Blood Screening 09/28/2021    Final                    Value:Result: Negative  Normal range: Negative      SARS-CoV-2, NAAT 09/28/2021 Not Detected  Not Detected Final    POC Glucose 09/28/2021 154* 70 - 99 mg/dl Final    Performed on 09/28/2021 ACCU-CHEK   Final    POC Glucose 09/28/2021 157* 70 - 99 mg/dl Final    Performed on 09/28/2021 ACCU-CHEK   Final    POC Glucose 09/28/2021 176* 70 - 99 mg/dl Final    Performed on 09/28/2021 ACCU-CHEK   Final    Sodium 09/29/2021 139  136 - 145 mmol/L Final    Potassium reflex Magnesium 09/29/2021 3.6  3.5 - 5.1 mmol/L Final    Chloride 09/29/2021 103  99 - 110 mmol/L Final    CO2 09/29/2021 26  21 - 32 mmol/L Final    Anion Gap 09/29/2021 10  3 - 16 Final    Glucose 09/29/2021 98  70 - 99 mg/dL Final    BUN 09/29/2021 13  7 - 20 mg/dL Final    CREATININE 09/29/2021 1.2  0.6 - 1.2 mg/dL Final    GFR Non- 09/29/2021 45* >60 Final    GFR  09/29/2021 55* >60 Final    Calcium 09/29/2021 9.5  8.3 - 10.6 mg/dL Final    Total Protein 09/29/2021 7.0  6.4 - 8.2 g/dL Final    Albumin 09/29/2021 3.8  3.4 - 5.0 g/dL Final    Albumin/Globulin Ratio 09/29/2021 1.2  1.1 - 2.2 Final    Total Bilirubin 09/29/2021 0.6  0.0 - 1.0 mg/dL Final    Alkaline Phosphatase 09/29/2021 138* 40 - 129 U/L Final    ALT 09/29/2021 39  10 - 40 U/L Final    AST 09/29/2021 27  15 - 37 U/L Final    Globulin 09/29/2021 3.2  g/dL Final    WBC 09/29/2021 5.4  4.0 - 11.0 K/uL Final    RBC 09/29/2021 3.83* 4.00 - 5.20 M/uL Final    Hemoglobin 09/29/2021 10.9* 12.0 - 16.0 g/dL Final    Hematocrit 09/29/2021 33.7* 36.0 - 48.0 % Final    MCV 09/29/2021 88.1  80.0 - 100.0 fL Final    MCH 09/29/2021 28.5  26.0 - 34.0 pg Final    MCHC 09/29/2021 32.3  31.0 - 36.0 g/dL Final    RDW 09/29/2021 13.3  12.4 - 15.4 % Final    Platelets 98/44/8284 161  135 - 450 K/uL Final    MPV 09/29/2021 8.7  5.0 - 10.5 fL Final    Neutrophils % 09/29/2021 37.6  % Final    Lymphocytes % 09/29/2021 50.9  % Final    Monocytes % 09/29/2021 9.6  % Final    Eosinophils % 09/29/2021 1.5  % Final    Basophils % 09/29/2021 0.4  % Final    Neutrophils Absolute 09/29/2021 2.0  1.7 - 7.7 K/uL Final    Lymphocytes Absolute 09/29/2021 2.7  1.0 - 5.1 K/uL Final    Monocytes Absolute 09/29/2021 0.5  0.0 - 1.3 K/uL Final    Eosinophils Absolute 09/29/2021 0.1  0.0 - 0.6 K/uL Final    Basophils Absolute 09/29/2021 0.0  0.0 - 0.2 K/uL Final    POC Glucose 09/28/2021 480* 70 - 99 mg/dl Final    Performed on 09/28/2021 ACCU-CHEK   Final    POC Glucose 09/28/2021 337* 70 - 99 mg/dl Final    Performed on 09/28/2021 ACCU-CHEK   Final    POC Glucose 09/29/2021 80  70 - 99 mg/dl Final    Performed on 09/29/2021 ACCU-CHEK Final    POC Glucose 09/29/2021 132* 70 - 99 mg/dl Final    Performed on 09/29/2021 ACCU-CHEK   Final    Left Ventricular Ejection Fraction 09/29/2021 60   Final-Edited    LVEF MODALITY 09/29/2021 CATH   Final-Edited    Hemoglobin A1C 09/29/2021 14.4  See comment % Final    eAG 09/29/2021 366.6  mg/dL Final    POC Glucose 09/29/2021 414* 70 - 99 mg/dl Final    Performed on 09/29/2021 ACCU-CHEK   Final    POC ACT LR 09/29/2021 251  Not Established sec Final   No results displayed because visit has over 200 results. Assessment and Plan     There are no diagnoses linked to this encounter. 1: Type 2 DM complicated with nephropathy, neuropathy TIAs, carotid stenosis   Uncontrolled A1C 14.9% < 14.4%   < 15.5%   < 15.3% <  9.3% (after transfusion) < 11.9% < 7.3% < 8.4%<  6.8% < 10.1%  << 8.1 <  8% < 11.7%    Cr 1.3, GFR 46 - May 2022   A1C of <8 would be acceptable because of microvascular and macrovascular complications. Questionable adherence to medical plan, treatment adherence and diet plan   Jian not approved      Need to bring meds on every visit    Bring meter every visit   Adherence to medications is questionable     Blood sugars not available   I gave her logs to complete with insulin doses     Stop Glimepiride 4 mg     Change Lantus 22 units daily in am.  Victoza 1.8 mg daily in AM    Change Novolog SS                     With meals (during the day)   - at bedtime    < 150 ---     5 units                                 0 units  151-200 --  6 units                                 0 units  201-250 :    7 units                                 1 units  251-300:     8 units                                 2 units  301-350:     9 units                                 3 units  >350 :         10 units                               4 units    Reiterated importance of taking care meds to control diabetes     All instructions provided in written. Check Blood sugars 4 times per day.  Log them along with insulin and send them every 2 weeks. Call for blood sugars less than 60 or more than 400. Eye exam: Last exam in March 12th 2018, Needs an exam now. Foot exam:  March 2020    Deformity/amputation: absent  Skin lesions/pre-ulcerative calluses: absent  Edema: right- negative, left- negative  Sensory exam: Monofilament sensation: normal  Plus at least one of the following:   Pulses: normal,   Vibration (128 Hz): Moderately decreased     Renal screen: High 68 Feb 2018, high 47 Jan 2019  > 40 June 2020     TSH screen: Feb 2022   Saw CDE in 2016 with Rd.     2: HTN   Controlled     3: Hyperlipidemia   LDL: 33, HDL: 39, TGs: 118 June 2020      Atorvastatin 10 mg daily     4: Unintentional weigh tloss - good appetite   TSH, FT4, am cortisol normal - Nov 2020    TSH, Free T4 normal Feb 2022   Following up with pcp     RTC in 3 months     EDUCATION:   Greater than 50% of this follow-up visit was spent in general counseling regarding   obesity, diet, exercise, importance of adherence to insulin regime, recognition and treatment of hypo and hyperglycemia,  glucose logging, proper diabetes management, diabetic complications with poor management and the importance of glycemic control in order to avoid the complications of diabetes. Risks and potential complications of diabetes were reviewed with the patient. Diabetes health maintenance plan and follow-up were discussed and understood by the patient. We reviewed the importance of medication compliance and regular follow-up. Aggressive lifestyle modification was encouraged. Exercise Counselling: This patient is a candidate for regular physical exercise.  Instructions to perform the following types of exercise:  Swimming or water aerobic exercise  Brisk walking  Playing tennis  Stationary bicycle or elliptical indoor  Low impact aerobic exercise    Instructions given to exercise for the following duration:  30 minutes a day for five-seven days per week.    Following instructions for being active throughout the day in addition to formal exercise:  Walk instead of drive whenever possible  Take the stairs instead of the elevator  Work in the garden  Park to the far end of the parking lot to add more walking steps to destination      Electronically signed by Regina Aguirre MD on 6/22/2022 at 1:12 PM

## 2022-06-30 ENCOUNTER — HOSPITAL ENCOUNTER (EMERGENCY)
Age: 62
Discharge: HOME | End: 2022-06-30
Payer: COMMERCIAL

## 2022-06-30 ENCOUNTER — APPOINTMENT (OUTPATIENT)
Dept: GENERAL RADIOLOGY | Age: 62
End: 2022-06-30
Payer: COMMERCIAL

## 2022-06-30 VITALS
WEIGHT: 110 LBS | DIASTOLIC BLOOD PRESSURE: 66 MMHG | SYSTOLIC BLOOD PRESSURE: 149 MMHG | BODY MASS INDEX: 19.49 KG/M2 | TEMPERATURE: 99.1 F | OXYGEN SATURATION: 99 % | HEART RATE: 68 BPM | HEIGHT: 63 IN | RESPIRATION RATE: 16 BRPM

## 2022-06-30 DIAGNOSIS — R73.9 HYPERGLYCEMIA: ICD-10-CM

## 2022-06-30 DIAGNOSIS — R07.9 CHEST PAIN, UNSPECIFIED TYPE: Primary | ICD-10-CM

## 2022-06-30 LAB
A/G RATIO: 1.1 (ref 1.1–2.2)
ALBUMIN SERPL-MCNC: 3.7 G/DL (ref 3.4–5)
ALP BLD-CCNC: 159 U/L (ref 40–129)
ALT SERPL-CCNC: 40 U/L (ref 10–40)
ANION GAP SERPL CALCULATED.3IONS-SCNC: 7 MMOL/L (ref 3–16)
AST SERPL-CCNC: 23 U/L (ref 15–37)
BASOPHILS ABSOLUTE: 0 K/UL (ref 0–0.2)
BASOPHILS RELATIVE PERCENT: 0.2 %
BILIRUB SERPL-MCNC: <0.2 MG/DL (ref 0–1)
BILIRUBIN URINE: NEGATIVE
BLOOD, URINE: ABNORMAL
BUN BLDV-MCNC: 22 MG/DL (ref 7–20)
CALCIUM SERPL-MCNC: 9.9 MG/DL (ref 8.3–10.6)
CHLORIDE BLD-SCNC: 98 MMOL/L (ref 99–110)
CLARITY: CLEAR
CO2: 30 MMOL/L (ref 21–32)
COLOR: YELLOW
CREAT SERPL-MCNC: 1.3 MG/DL (ref 0.6–1.2)
EOSINOPHILS ABSOLUTE: 0.1 K/UL (ref 0–0.6)
EOSINOPHILS RELATIVE PERCENT: 1.3 %
EPITHELIAL CELLS, UA: NORMAL /HPF (ref 0–5)
GFR AFRICAN AMERICAN: 50
GFR NON-AFRICAN AMERICAN: 41
GLUCOSE BLD-MCNC: 491 MG/DL (ref 70–99)
GLUCOSE URINE: >=1000 MG/DL
HCT VFR BLD CALC: 29 % (ref 36–48)
HEMOGLOBIN: 9.2 G/DL (ref 12–16)
KETONES, URINE: NEGATIVE MG/DL
LEUKOCYTE ESTERASE, URINE: NEGATIVE
LYMPHOCYTES ABSOLUTE: 1.5 K/UL (ref 1–5.1)
LYMPHOCYTES RELATIVE PERCENT: 39.7 %
MCH RBC QN AUTO: 28.5 PG (ref 26–34)
MCHC RBC AUTO-ENTMCNC: 31.7 G/DL (ref 31–36)
MCV RBC AUTO: 89.8 FL (ref 80–100)
MICROSCOPIC EXAMINATION: YES
MONOCYTES ABSOLUTE: 0.2 K/UL (ref 0–1.3)
MONOCYTES RELATIVE PERCENT: 6.4 %
NEUTROPHILS ABSOLUTE: 2 K/UL (ref 1.7–7.7)
NEUTROPHILS RELATIVE PERCENT: 52.4 %
NITRITE, URINE: NEGATIVE
PDW BLD-RTO: 12.7 % (ref 12.4–15.4)
PH UA: 6 (ref 5–8)
PLATELET # BLD: 138 K/UL (ref 135–450)
PMV BLD AUTO: 8.8 FL (ref 5–10.5)
POTASSIUM SERPL-SCNC: 4.6 MMOL/L (ref 3.5–5.1)
PROTEIN UA: 30 MG/DL
RBC # BLD: 3.23 M/UL (ref 4–5.2)
RBC UA: NORMAL /HPF (ref 0–4)
SODIUM BLD-SCNC: 135 MMOL/L (ref 136–145)
SPECIFIC GRAVITY UA: 1.01 (ref 1–1.03)
TOTAL PROTEIN: 7.2 G/DL (ref 6.4–8.2)
TROPONIN: <0.01 NG/ML
URINE TYPE: ABNORMAL
UROBILINOGEN, URINE: 0.2 E.U./DL
WBC # BLD: 3.9 K/UL (ref 4–11)
WBC UA: NORMAL /HPF (ref 0–5)

## 2022-06-30 PROCEDURE — 93005 ELECTROCARDIOGRAM TRACING: CPT | Performed by: EMERGENCY MEDICINE

## 2022-06-30 PROCEDURE — 96372 THER/PROPH/DIAG INJ SC/IM: CPT

## 2022-06-30 PROCEDURE — 2580000003 HC RX 258: Performed by: NURSE PRACTITIONER

## 2022-06-30 PROCEDURE — 6370000000 HC RX 637 (ALT 250 FOR IP): Performed by: NURSE PRACTITIONER

## 2022-06-30 PROCEDURE — 99285 EMERGENCY DEPT VISIT HI MDM: CPT

## 2022-06-30 PROCEDURE — 81001 URINALYSIS AUTO W/SCOPE: CPT

## 2022-06-30 PROCEDURE — 71045 X-RAY EXAM CHEST 1 VIEW: CPT

## 2022-06-30 PROCEDURE — 85025 COMPLETE CBC W/AUTO DIFF WBC: CPT

## 2022-06-30 PROCEDURE — 84484 ASSAY OF TROPONIN QUANT: CPT

## 2022-06-30 PROCEDURE — 80053 COMPREHEN METABOLIC PANEL: CPT

## 2022-06-30 RX ORDER — INSULIN LISPRO 100 [IU]/ML
7 INJECTION, SOLUTION INTRAVENOUS; SUBCUTANEOUS ONCE
Status: COMPLETED | OUTPATIENT
Start: 2022-06-30 | End: 2022-06-30

## 2022-06-30 RX ORDER — 0.9 % SODIUM CHLORIDE 0.9 %
1000 INTRAVENOUS SOLUTION INTRAVENOUS ONCE
Status: COMPLETED | OUTPATIENT
Start: 2022-06-30 | End: 2022-06-30

## 2022-06-30 RX ADMIN — INSULIN LISPRO 7 UNITS: 100 INJECTION, SOLUTION INTRAVENOUS; SUBCUTANEOUS at 21:48

## 2022-06-30 RX ADMIN — SODIUM CHLORIDE 1000 ML: 9 INJECTION, SOLUTION INTRAVENOUS at 21:32

## 2022-06-30 ASSESSMENT — PAIN SCALES - GENERAL: PAINLEVEL_OUTOF10: 8

## 2022-06-30 ASSESSMENT — PAIN DESCRIPTION - LOCATION: LOCATION: CHEST

## 2022-06-30 ASSESSMENT — PAIN - FUNCTIONAL ASSESSMENT: PAIN_FUNCTIONAL_ASSESSMENT: 0-10

## 2022-07-01 LAB
EKG ATRIAL RATE: 91 BPM
EKG DIAGNOSIS: NORMAL
EKG P AXIS: 71 DEGREES
EKG P-R INTERVAL: 140 MS
EKG Q-T INTERVAL: 354 MS
EKG QRS DURATION: 70 MS
EKG QTC CALCULATION (BAZETT): 435 MS
EKG R AXIS: -28 DEGREES
EKG T AXIS: 73 DEGREES
EKG VENTRICULAR RATE: 91 BPM

## 2022-07-01 PROCEDURE — 93010 ELECTROCARDIOGRAM REPORT: CPT | Performed by: INTERNAL MEDICINE

## 2022-07-01 NOTE — ED PROVIDER NOTES
[address] [place] [address]  ED  EMERGENCY DEPARTMENT ENCOUNTER      This patient was not seen and evaluated by the attending physician. Pt Name: Gilmer Alejandro  MRN: [id number]  [place] 1960  Date of evaluation: 6/30/2022  Provider: SARTHAK Kumar - CNP-C  PCP: Fabi Trimble      History provided by th patient. CHIEFCOMPLAINT:     Chief Complaint   Patient presents with    Chest Pain     started around noon. pt took oxycodone and klonopon around then and has been weak     Fatigue       HISTORY OF PRESENT ILLNESS:      Gilmer Alejandro is a 60 y.o. female who presents to [address] [place] [address]  ED with complaints of chest pain. Patient states that she said chest pain has been on for couple days, states that she also is having trouble ambulating today, states that she just feels weak, she took her oxycodone and her Klonopin today, she states that she has episodes where she just cannot walk because of weakness, states that she had one not too long ago and then it started again today. Denies any unilateral symptoms, states that she is just generally weak, no fevers, here for further evaluation. LOCATION:chest  QUALITY:ache  SEVERITY:8  DURATION:today  MODIFYING FACTORS:none noted    Nursing Notes were reviewed     REVIEW OF SYSTEMS:     Review of Systems  All systems, a total of 10, are reviewed and negative except for those that were just noted in history present illness.         PAST MEDICAL HISTORY:     Past Medical History:   Diagnosis Date    Abnormal brain MRI 7/20/2017    Partially empty sella and minimal chronic small vessel ischemic disease    Acute bilateral low back pain without sciatica 11/2/2016    SHARRON (acute kidney injury) ([facility] [address] 7/5/2017    Arthritis     back    Bipolar disorder ([facility] 10/18/2008    CAD (coronary artery disease)     stent placed 6/8/20    Cancer ([facility] [address] 2015    bilateral breast:s/p lumpectomy/radiation:under care care of breast specialist:Dr. Isaiah Poon Carotid stenosis, bilateral:<50%:per US 7/2016 7/15/2016    Carpal tunnel syndrome 10/18/2008    Cervical cancer screening 2014    Nml per pt'.  Coronary artery disease of native artery of native heart with stable angina pectoris ([facility] [address] 6/9/2020    DDD (degenerative disc disease), lumbar 7/18/2018    Depression     under care of pschiatrist:Dr. Viridiana Leach    Depression/anxiety 7/5/2017    Depression/anxiety     Diabetes mellitus (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 [address] 9/17/2017    Microalbuminuria 7/1/2016    Neuropathy in diabetes ([facility] [address]     Non morbid obesity 7/1/2016    Pancreatitis 5/12/16    [facility] hospitalization 5/12/16-5/16/16:under care of GI:chronic pancreatitis    S/P endoscopy 6/14/2016    [place]:per pt' & her family member was nml.     Scoliosis     Spondylosis of lumbar region without myelopathy or radiculopathy 3/10/2017    Transient cerebral ischemia 07/15/2016    TIA:7/10/16    Unspecified cerebral artery occlusion with cerebral infarction     TIA         SURGICAL HISTORY:      Past Surgical History:   Procedure Laterality Date    BREAST LUMPECTOMY  2015    Bilateral:breast cancer    CARDIAC CATHETERIZATION  06/08/2020    Dr. Pereira Serum), DAYANA to Diag 1    COLONOSCOPY N/A 2/1/2021    COLONOSCOPY DIAGNOSTIC performed by Emi Royal MD at [place] [id number]  2/3/2021    CT BONE MARROW BIOPSY 2/3/2021 Tree Encinas MD [facility] CT SCAN    HYSTERECTOMY (CERVIX STATUS UNKNOWN)      Benign:no cervical cancer per pt'    KIDNEY REMOVAL      right    OTHER SURGICAL HISTORY Right     orif right ankle    TEMPORAL ARTERY BIOPSY Right 8/9/2021    RIGHT TEMPORAL ARTERY BIOPSY LIGATION performed by Rafael De Jesus MD at [facility] TUBAL LIGATION      UPPER GASTROINTESTINAL ENDOSCOPY N/A 1/29/2021    EGD BIOPSY performed by Pino Payne MD at [facility]:       Discharge Medication List as of 6/30/2022 10:28 PM      CONTINUE these medications which have NOT CHANGED    Details   insulin glargine (LANTUS SOLOSTAR) 100 UNIT/ML injection pen Inject 32 Units into the skin every morning, Disp-5 pen, R-5Normal      TRUE METRIX BLOOD GLUCOSE TEST strip USE AS DIRECTED TO TEST 4 TIMES A DAY, Disp-200 strip, R-5Normal      lisinopril (PRINIVIL;ZESTRIL) 40 MG tablet TAKE 1 TABLET BY MOUTH EVERY DAY, Disp-30 tablet, R-5Normal      atorvastatin (LIPITOR) 20 MG tablet TAKE 1 TABLET BY MOUTH EVERY DAY, Disp-30 tablet, R-5Normal      ticagrelor (BRILINTA) 90 MG TABS tablet TAKE 1 TABLET BY MOUTH TWICE A DAY, Disp-60 tablet, R-11Normal      Insulin Pen Needle 32G X 4 MM MISC DAILY Starting Mon 3/28/2022, Disp-100 each, R-3, Normal      VICTOZA 18 MG/3ML SOPN SC injection INJECT 1.8 MG UNDER THE SKIN ONCE DAILY, Disp-2 pen, R-5Normal      Blood Glucose Monitoring Suppl (TRUE METRIX METER) w/Device KIT Used to  check blood sugar 4 times eyad, Disp-1 kit, R-1Normal      nitroGLYCERIN (NITROSTAT) 0.4 MG SL tablet up to max of 3 total doses. If no relief after 1 dose, call 911., Disp-25 tablet, R-0Normal      insulin lispro, 1 Unit Dial, (HUMALOG KWIKPEN) 100 UNIT/ML SOPN Inject 8 Units into the skin 3 times daily Historical Med      gabapentin (NEURONTIN) 600 MG tablet Take 0.5 tablets by mouth 2 times daily for 3 days. , Disp-90 tablet, R-3NO PRINT      paliperidone (INVEGA) 9 MG extended release tablet Take 9 mg by mouth every morning Historical Med      pantoprazole (PROTONIX) 40 MG tablet Take 40 mg by mouth daily Historical Med      ferrous sulfate (IRON 325) 325 (65 Fe) MG tablet Take 325 mg by mouth daily (with breakfast)Historical Med      oxyCODONE-acetaminophen (PERCOCET) 7.5-325 MG per tablet TAKE 1 TABLET BY MOUTH EVERY 6 (SIX) HOURS AS NEEDED FOR UP TO 28 DAYS. Historical Med      clonazePAM (KLONOPIN) 0.5 MG tablet Take 0.5 mg by mouth 3 times daily as needed. Historical Med      calcium carbonate-vitamin D (CALTRATE) 600-400 MG-UNIT TABS per tab Take 1 tablet by mouth daily Historical Med      aspirin 81 MG tablet Take 81 mg by mouth dailyHistorical Med      traZODone (DESYREL) 100 MG tablet Take 200 mg by mouth nightly Historical Med               ALLERGIES:    Morphine, Penicillins, Codeine, and Penicillin g    FAMILY HISTORY:       Family History   Problem Relation Age of Onset    Cancer Mother         breast    Cancer Father     Heart Failure Neg Hx     High Cholesterol Neg Hx     Hypertension Neg Hx     Migraines Neg Hx     Rashes/Skin Problems Neg Hx     Seizures Neg Hx     Stroke Neg Hx     Thyroid Disease Neg Hx     Diabetes Neg Hx           SOCIAL HISTORY:     Social History     Socioeconomic History    Marital status:      Spouse name: None    Number of children: 1    Years of education: None    Highest education level: None   Occupational History    Occupation:    Tobacco Use    Smoking status: Former Smoker     Packs/day: 0.50     Years: 20.00     Pack years: 10.00     Types: Cigarettes, Cigars     Quit date: 7/3/2014     Years since quittin.0    Smokeless tobacco: Never Used   Vaping Use    Vaping Use: Never used   Substance and Sexual Activity    Alcohol use: No     Alcohol/week: 0.0 standard drinks    Drug use: No    Sexual activity: None   Other Topics Concern    None   Social History Narrative    None     Social Determinants of Health     Financial Resource Strain:     Difficulty of Paying Living Expenses: Not on file   Food Insecurity:     Worried About Running Out of Food in the Last Year: Not on file    Tabby of Food in the Last Year: Not on file   Transportation Needs:     Lack of Transportation (Medical):  Not on file    Lack of Transportation (Non-Medical): Not on file   Physical Activity:     Days of Exercise per Week: Not on file    Minutes of Exercise per Session: Not on file   Stress:     Feeling of Stress : Not on file   Social Connections:     Frequency of Communication with Friends and Family: Not on file    Frequency of Social Gatherings with Friends and Family: Not on file    Attends Orthodox Services: Not on file    Active Member of [address] [place] [address] or Organizations: Not on file    Attends Club or Organization Meetings: Not on file    Marital Status: Not on file   Intimate Partner Violence:     Fear of Current or Ex-Partner: Not on file    Emotionally Abused: Not on file    Physically Abused: Not on file    Sexually Abused: Not on file   Housing Stability:     Unable to Pay for Housing in the Last Year: Not on file    Number of [place] in the Last Year: Not on file    Unstable Housing in the Last Year: Not on file       SCREENINGS:    [place] Coma Scale  Eye Opening: Spontaneous  Best Verbal Response: Oriented  Best Motor Response: Obeys commands  [place] Coma Scale Score: 15        PHYSICAL EXAM:       ED Triage Vitals [06/30/22 1927]   BP Temp Temp src Heart Rate Resp SpO2 Height Weight   120/66 99.1 °F (37.3 °C) -- 93 16 98 % 5' 3\" (1.6 m) 110 lb (49.9 kg)       Physical Exam    CONSTITUTIONAL: Awake and alert. Cooperative. Well-developed. Well-nourished. Vitals:    06/30/22 1927 06/30/22 2114 06/30/22 2243   BP: 120/66 (!) 161/70 (!) 149/66   Pulse: 93 65 68   Resp: 16 16 16   Temp: 99.1 °F (37.3 °C)     SpO2: 98% 99%    Weight: 110 lb (49.9 kg)     Height: 5' 3\" (1.6 m)       HENT: Normocephalic. Atraumatic. External ears normal, without discharge. TMs clear bilaterally. Nonasal discharge. Oropharynx clear, no erythema. Mucous membranes moist.  EYES: Conjunctiva non-injected, nolid abnormalities noted. No scleral icterus. PERRL. EOM's grossly intact. Anterior chambers clear. NECK: Supple. Normal ROM. No meningismus.  No thyroid tenderness or swelling noted. CARDIOVASCULAR: RRR. No [place]. No carotid bruits. PULMONARY/CHEST WALL: Effort normal. No tachypnea. Lungs clear to ausculation. ABDOMEN: Normal BS. Soft. Nondistended. No tenderness to palpation. No guarding. No hernias noted. No splenomegaly. Back: Spine is midline. No ecchymosis. No crepituson palpation. No obvious subluxation of vertebral column. No saddle anesthesia or evidence of cauda equina. /ANORECTAL: Not assessed  MUSKULOSKELETAL: Normal ROM. No acute deformities. No edema. No tenderness to palpate. SKIN: Warm and dry. NEUROLOGICAL:  GCS 15. CN II-XII grossly intact. Strength is 5/5 in all extremities and sensation is intact. PSYCHIATRIC: Normal affect, normal insight and judgement. Alert and oriented x 3.         DIAGNOSTIC RESULTS:     LABS:    Results for orders placed or performed during the hospital encounter of 06/30/22   CBC with Auto Differential   Result Value Ref Range    WBC 3.9 (L) 4.0 - 11.0 K/uL    RBC 3.23 (L) 4.00 - 5.20 M/uL    Hemoglobin 9.2 (L) 12.0 - 16.0 g/dL    Hematocrit 29.0 (L) 36.0 - 48.0 %    MCV 89.8 80.0 - 100.0 fL    MCH 28.5 26.0 - 34.0 pg    MCHC 31.7 31.0 - 36.0 g/dL    RDW 12.7 12.4 - 15.4 %    Platelets 009 700 - 202 K/uL    MPV 8.8 5.0 - 10.5 fL    Neutrophils % 52.4 %    Lymphocytes % 39.7 %    Monocytes % 6.4 %    Eosinophils % 1.3 %    Basophils % 0.2 %    Neutrophils Absolute 2.0 1.7 - 7.7 K/uL    Lymphocytes Absolute 1.5 1.0 - 5.1 K/uL    Monocytes Absolute 0.2 0.0 - 1.3 K/uL    Eosinophils Absolute 0.1 0.0 - 0.6 K/uL    Basophils Absolute 0.0 0.0 - 0.2 K/uL   Comprehensive Metabolic Panel   Result Value Ref Range    Sodium 135 (L) 136 - 145 mmol/L    Potassium 4.6 3.5 - 5.1 mmol/L    Chloride 98 (L) 99 - 110 mmol/L    CO2 30 21 - 32 mmol/L    Anion Gap 7 3 - 16    Glucose 491 (H) 70 - 99 mg/dL    BUN 22 (H) 7 - 20 mg/dL    CREATININE 1.3 (H) 0.6 - 1.2 mg/dL    GFR Non- 41 (A) >60    GFR  American 50 (A) >60    Calcium 9.9 8.3 - 10.6 mg/dL    Total Protein 7.2 6.4 - 8.2 g/dL    Albumin 3.7 3.4 - 5.0 g/dL    Albumin/Globulin Ratio 1.1 1.1 - 2.2    Total Bilirubin <0.2 0.0 - 1.0 mg/dL    Alkaline Phosphatase 159 (H) 40 - 129 U/L    ALT 40 10 - 40 U/L    AST 23 15 - 37 U/L   Urinalysis   Result Value Ref Range    Color, UA Yellow Straw/Yellow    Clarity, UA Clear Clear    Glucose, Ur >=1000 (A) Negative mg/dL    Bilirubin Urine Negative Negative    Ketones, Urine Negative Negative mg/dL    Specific Gravity, UA 1.015 1.005 - 1.030    Blood, Urine TRACE-LYSED (A) Negative    pH, UA 6.0 5.0 - 8.0    Protein, UA 30 (A) Negative mg/dL    Urobilinogen, Urine 0.2 <2.0 E.U./dL    Nitrite, Urine Negative Negative    Leukocyte Esterase, Urine Negative Negative    Microscopic Examination YES     Urine Type NotGiven    Troponin   Result Value Ref Range    Troponin <0.01 <0.01 ng/mL   Microscopic Urinalysis   Result Value Ref Range    WBC, UA 3-5 0 - 5 /HPF    RBC, UA 0-2 0 - 4 /HPF    Epithelial Cells, UA 0-1 0 - 5 /HPF   EKG 12 Lead   Result Value Ref Range    Ventricular Rate 91 BPM    Atrial Rate 91 BPM    P-R Interval 140 ms    QRS Duration 70 ms    Q-T Interval 354 ms    QTc Calculation (Bazett) 435 ms    P Axis 71 degrees    R Axis -28 degrees    T Axis 73 degrees    Diagnosis       Normal sinus rhythmNormal ECGWhen compared with ECG of 31-MAR-2022 13:15,Vent. rate has increased BY  37 BPMConfirmed by Mahesh Durand MD, Leo Adrienne ([id number]) on 7/1/2022 4:48:19 PM         RADIOLOGY:  All x-ray studies are viewed/reviewed by me. Formal interpretations per the radiologist are as follows:      XR CHEST PORTABLE   Final Result   Stable chest x-ray. No acute disease. EKG:  See EKG interpretation by an attending physician.       PROCEDURES:   N/A    CRITICAL CARE TIME:   N/A    CONSULTS:  None      EMERGENCY DEPARTMENT COURSE andDIFFERENTIAL DIAGNOSIS/MDM:   Vitals:    Vitals:    06/30/22 1927 06/30/22 2114 06/30/22 2243   BP: 120/66 (!) 161/70 (!) 149/66   Pulse: 93 65 68   Resp: 16 16 16   Temp: 99.1 °F (37.3 °C)     SpO2: 98% 99%    Weight: 110 lb (49.9 kg)     Height: 5' 3\" (1.6 m)         Patient wasgiven the following medications:  Medications   0.9 % sodium chloride bolus (0 mLs IntraVENous Stopped 6/30/22 2232)   insulin lispro (HUMALOG) injection vial 7 Units (7 Units SubCUTAneous Given 6/30/22 2148)         Patient was evaluated independently by myself with the attending physician available for consultation. Patient presented to the emergency department today with complaints of chest discomfort, patient also complained of weakness, she states that she had these issues in the past, her work-up today is unremarkable, she said the chest pain for several days now, is able to ambulate without difficulty. Her glucose was significantly elevated, she was given insulin as well as fluids, she was instructed to monitor her glucose at home, she states that she has not checked it in a couple days. She verbalized understanding of the plan of care I saw no indications for further evaluation here, no signs of an acute emergent medical condition, EKG was unremarkable, negative troponin. Patient discharged home in good condition with strict return precautions. Patient laboratory studies, radiographic imaging, and assessment were all discussed with the patient and/orpatient family. There was shared decision-making between myself as well as the patient and/or their surrogate and we are all in agreement with discharge home. There was an opportunity for questions and all questions were answered tothe best of my ability and to the satisfaction of the patient and/or patient family. FINAL IMPRESSION:      1. Chest pain, unspecified type    2.  Hyperglycemia          DISPOSITION/PLAN:   DISPOSITION Decision To Discharge      PATIENT REFERRED TO:  Diana Cade  [address] [address]  [phone]    Call   For follow up      DISCHARGE MEDICATIONS:  Discharge Medication List as of 6/30/2022 10:28 PM                     (Please note thatportions of this note were completed with a voice recognition program.  Efforts were made to edit the dictations, but occasionally words are mis-transcribed.)    SARTHAK Hidalgo - CNP-C (electronicallysigned)        SARTHAK Hidalgo CNP  07/01/22 [id number]

## 2022-07-01 NOTE — ED PROVIDER NOTES
I have reviewed the below EKG. I was not otherwise involved in this patient's care. EKG  The Ekg interpreted by myself  normal sinus rhythm with a rate of 91  Axis is   Normal  QTc is  normal  Intervals and Durations are unremarkable. No specific ST-T wave changes appreciated. No evidence of acute ischemia.    Normal sinus rhythm has replaced sinus bradycardia when compared to the EKG from March 31, 2022        Alethea Alexandra MD  06/30/22 2008

## 2022-07-15 ENCOUNTER — HOSPITAL ENCOUNTER (OUTPATIENT)
Dept: ULTRASOUND IMAGING | Age: 62
Discharge: HOME | End: 2022-07-15
Payer: COMMERCIAL

## 2022-07-15 DIAGNOSIS — N95.0 POST-MENOPAUSAL BLEEDING: ICD-10-CM

## 2022-07-15 PROCEDURE — 76856 US EXAM PELVIC COMPLETE: CPT

## 2022-07-22 ENCOUNTER — TELEPHONE (OUTPATIENT)
Dept: ENDOCRINOLOGY | Age: 62
End: 2022-07-22

## 2022-07-22 DIAGNOSIS — Z79.4 TYPE 2 DIABETES MELLITUS WITH DIABETIC NEPHROPATHY, WITH LONG-TERM CURRENT USE OF INSULIN (HCC): ICD-10-CM

## 2022-07-22 DIAGNOSIS — E11.29 TYPE 2 DIABETES MELLITUS WITH OTHER DIABETIC KIDNEY COMPLICATION (HCC): ICD-10-CM

## 2022-07-22 DIAGNOSIS — E11.21 TYPE 2 DIABETES MELLITUS WITH DIABETIC NEPHROPATHY, WITH LONG-TERM CURRENT USE OF INSULIN (HCC): ICD-10-CM

## 2022-07-22 RX ORDER — ATORVASTATIN CALCIUM 20 MG/1
TABLET, FILM COATED ORAL
Qty: 30 TABLET | Refills: 5 | Status: SHIPPED | OUTPATIENT
Start: 2022-07-22

## 2022-07-22 RX ORDER — INSULIN GLARGINE 100 [IU]/ML
32 INJECTION, SOLUTION SUBCUTANEOUS EVERY MORNING
Qty: 5 PEN | Refills: 5 | Status: SHIPPED | OUTPATIENT
Start: 2022-07-22 | End: 2022-10-27 | Stop reason: SDUPTHER

## 2022-07-22 RX ORDER — LIRAGLUTIDE 6 MG/ML
INJECTION SUBCUTANEOUS
Qty: 2 PEN | Refills: 5 | Status: SHIPPED | OUTPATIENT
Start: 2022-07-22 | End: 2022-10-27 | Stop reason: SDUPTHER

## 2022-07-22 RX ORDER — CALCIUM CITRATE/VITAMIN D3 200MG-6.25
TABLET ORAL
Qty: 200 STRIP | Refills: 5 | Status: SHIPPED | OUTPATIENT
Start: 2022-07-22

## 2022-07-22 RX ORDER — LISINOPRIL 40 MG/1
TABLET ORAL
Qty: 30 TABLET | Refills: 5 | Status: SHIPPED | OUTPATIENT
Start: 2022-07-22

## 2022-07-22 NOTE — TELEPHONE ENCOUNTER
[facility] calling stating that patient is no longer using [facility] pharmacy and wants all medication prescribed by Dr. Gareth Overton switched over to them    Medication list:   Glimepiride 4mg     Lisinopril 40mg     Atorvastatin (Lipitor) 20mg     Insulin glargine (Lantus Solostar) 100 unit     True metrix blood glucose test strips     Insulin pen needle 32G X 4 MM MISC     Victoza 18 MG/3ML SOPN SC injection     Blood glucose monitoring suppl (TRUE     METRIX METTER) w/ device kit       [address] [phone]  Fax [phone]    Please advise

## 2022-09-27 ENCOUNTER — OFFICE VISIT (OUTPATIENT)
Dept: ENDOCRINOLOGY | Age: 62
End: 2022-09-27
Payer: COMMERCIAL

## 2022-09-27 VITALS
WEIGHT: 111.6 LBS | OXYGEN SATURATION: 99 % | DIASTOLIC BLOOD PRESSURE: 78 MMHG | HEART RATE: 56 BPM | HEIGHT: 63 IN | BODY MASS INDEX: 19.77 KG/M2 | SYSTOLIC BLOOD PRESSURE: 162 MMHG

## 2022-09-27 DIAGNOSIS — Z79.4 TYPE 2 DIABETES MELLITUS WITH DIABETIC NEPHROPATHY, WITH LONG-TERM CURRENT USE OF INSULIN (HCC): ICD-10-CM

## 2022-09-27 DIAGNOSIS — E11.21 TYPE 2 DIABETES MELLITUS WITH DIABETIC NEPHROPATHY, WITH LONG-TERM CURRENT USE OF INSULIN (HCC): ICD-10-CM

## 2022-09-27 DIAGNOSIS — E11.29 TYPE 2 DIABETES MELLITUS WITH OTHER DIABETIC KIDNEY COMPLICATION (HCC): ICD-10-CM

## 2022-09-27 DIAGNOSIS — I10 ESSENTIAL (PRIMARY) HYPERTENSION: ICD-10-CM

## 2022-09-27 LAB
A/G RATIO: 1.2 (ref 1.1–2.2)
ALBUMIN SERPL-MCNC: 4.2 G/DL (ref 3.4–5)
ALP BLD-CCNC: 151 U/L (ref 40–129)
ALT SERPL-CCNC: 62 U/L (ref 10–40)
ANION GAP SERPL CALCULATED.3IONS-SCNC: 13 MMOL/L (ref 3–16)
AST SERPL-CCNC: 49 U/L (ref 15–37)
BILIRUB SERPL-MCNC: 0.3 MG/DL (ref 0–1)
BUN BLDV-MCNC: 22 MG/DL (ref 7–20)
CALCIUM SERPL-MCNC: 9.9 MG/DL (ref 8.3–10.6)
CHLORIDE BLD-SCNC: 102 MMOL/L (ref 99–110)
CHOLESTEROL, TOTAL: 206 MG/DL (ref 0–199)
CO2: 25 MMOL/L (ref 21–32)
CREAT SERPL-MCNC: 1.1 MG/DL (ref 0.6–1.2)
CREATININE URINE: 76.5 MG/DL (ref 28–259)
GFR AFRICAN AMERICAN: >60
GFR NON-AFRICAN AMERICAN: 50
GLUCOSE BLD-MCNC: 333 MG/DL (ref 70–99)
HDLC SERPL-MCNC: 64 MG/DL (ref 40–60)
LDL CHOLESTEROL CALCULATED: 92 MG/DL
MICROALBUMIN UR-MCNC: 71.7 MG/DL
MICROALBUMIN/CREAT UR-RTO: 937.3 MG/G (ref 0–30)
POTASSIUM SERPL-SCNC: 3.9 MMOL/L (ref 3.5–5.1)
SODIUM BLD-SCNC: 140 MMOL/L (ref 136–145)
TOTAL PROTEIN: 7.6 G/DL (ref 6.4–8.2)
TRIGL SERPL-MCNC: 249 MG/DL (ref 0–150)
VLDLC SERPL CALC-MCNC: 50 MG/DL

## 2022-09-27 PROCEDURE — 1036F TOBACCO NON-USER: CPT | Performed by: INTERNAL MEDICINE

## 2022-09-27 PROCEDURE — 2022F DILAT RTA XM EVC RTNOPTHY: CPT | Performed by: INTERNAL MEDICINE

## 2022-09-27 PROCEDURE — G8427 DOCREV CUR MEDS BY ELIG CLIN: HCPCS | Performed by: INTERNAL MEDICINE

## 2022-09-27 PROCEDURE — 3017F COLORECTAL CA SCREEN DOC REV: CPT | Performed by: INTERNAL MEDICINE

## 2022-09-27 PROCEDURE — G8420 CALC BMI NORM PARAMETERS: HCPCS | Performed by: INTERNAL MEDICINE

## 2022-09-27 PROCEDURE — 99214 OFFICE O/P EST MOD 30 MIN: CPT | Performed by: INTERNAL MEDICINE

## 2022-09-27 PROCEDURE — 3046F HEMOGLOBIN A1C LEVEL >9.0%: CPT | Performed by: INTERNAL MEDICINE

## 2022-09-27 RX ORDER — GLIMEPIRIDE 4 MG/1
4 TABLET ORAL
Qty: 90 TABLET | Refills: 1 | Status: SHIPPED | OUTPATIENT
Start: 2022-09-27 | End: 2022-10-27 | Stop reason: SDUPTHER

## 2022-09-27 RX ORDER — PIOGLITAZONEHYDROCHLORIDE 30 MG/1
TABLET ORAL
COMMUNITY
Start: 2022-09-07

## 2022-09-27 RX ORDER — GLIMEPIRIDE 4 MG/1
TABLET ORAL
COMMUNITY
Start: 2022-09-12 | End: 2022-09-27

## 2022-09-27 NOTE — PROGRESS NOTES
Patient ID:   Nery Aldana is a 60 y.o. female  Chief Complaint:   Nery Aldana presents for an evaluation of Type 2 Diabetes Mellitus , Hyperlipidemia and hypertension. Subjective:   Type 2 Diabetes Mellitus diagnosed around 2010  On insulin since 2012  Previous regimen:Taking Admelog 15 units tidac and 5 units for snacks. Basaglar 40 units once a day at night. VGO stopped due to hypoglycemias     Multiple hospitalizations in 2021 due to weakness, anemia   She is recently released from a nursing home     Not taking:   Synjardy 12.5-1000mg, two tabs in am. Last pick ups: Aug 2020 and Feb 2021. Admits making poor dietary choices, trying to cut down fried. Currently on   Lantus 22 units daily in am. She said she missed 2-3 in a week. Victoza 1.8 mg daily in AM    Novolog SS                      With meals (during the day)   - at bedtime    < 150 ---     5 units                                 0 units  151-200 --  6 units                                 0 units  201-250 :    7 units                                 1 units  251-300:     8 units                                 2 units  301-350:     9 units                                 3 units  >350 :         10 units                               4 units      She thinks she is eating too much bread , pasta     Checks blood sugars 2-3 times per day. Reviewed > 300   AM:   Lunch:   Supper:    HS:        Hypoglycemias: Once in last 4 weeks      Meals: 3, dinner is big. Snacks (jellos, fruits, pretzels) after every meal because she is hungry. Exercise: None    Denies chest pain, exertional dyspnea. Family history of CAD: Both parents  Denies smoking/ alcohol.         The following portions of the patient's history were reviewed and updated as appropriate:       Family History   Problem Relation Age of Onset    Cancer Mother         breast    Cancer Father     Heart Failure Neg Hx     High Cholesterol Neg Hx     Hypertension Neg Hx     Migraines Neg Hx     Rashes/Skin Problems Neg Hx     Seizures Neg Hx     Stroke Neg Hx     Thyroid Disease Neg Hx     Diabetes Neg Hx          Social History     Socioeconomic History    Marital status:      Spouse name: Not on file    Number of children: 1    Years of education: Not on file    Highest education level: Not on file   Occupational History    Occupation:    Tobacco Use    Smoking status: Former     Packs/day: 0.50     Years: 20.00     Pack years: 10.00     Types: Cigarettes, Cigars     Quit date: 7/3/2014     Years since quittin.2    Smokeless tobacco: Never   Vaping Use    Vaping Use: Never used   Substance and Sexual Activity    Alcohol use: No     Alcohol/week: 0.0 standard drinks    Drug use: No    Sexual activity: Not on file   Other Topics Concern    Not on file   Social History Narrative    Not on file     Social Determinants of Health     Financial Resource Strain: Not on file   Food Insecurity: Not on file   Transportation Needs: Not on file   Physical Activity: Not on file   Stress: Not on file   Social Connections: Not on file   Intimate Partner Violence: Not on file   Housing Stability: Not on file       Past Medical History:   Diagnosis Date    Abnormal brain MRI 2017    Partially empty sella and minimal chronic small vessel ischemic disease    Acute bilateral low back pain without sciatica 2016    SHARRON (acute kidney injury) ([facility] [address] 2017    Arthritis     back    Bipolar disorder ([facility] [address] 10/18/2008    CAD (coronary artery disease)     stent placed 20    Cancer ([facility] [address]     bilateral breast:s/p lumpectomy/radiation:under care care of breast specialist:Dr. Boone     Carotid stenosis, bilateral:<50%:per US 2016 7/15/2016    Carpal tunnel syndrome 10/18/2008    Cervical cancer screening 2014    Nml per pt'.     Coronary artery disease of native artery of native heart with stable angina pectoris ([facility] [address] 2020    DDD (degenerative disc disease), lumbar 2018 Depression     under care of pschiatrist:Dr. 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)     Non morbid obesity 7/1/2016    Pancreatitis 5/12/16    [facility] hospitalization 5/12/16-5/16/16:under care of GI:chronic pancreatitis    S/P endoscopy 6/14/2016    [facility]:per pt' & her family member was nml.     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 myelopathy or radiculopathy 3/10/2017    Transient cerebral ischemia 07/15/2016    TIA:7/10/16    Unspecified cerebral artery occlusion with cerebral infarction     TIA       Past Surgical History:   Procedure Laterality Date    BREAST LUMPECTOMY  2015    Bilateral:breast cancer    CARDIAC CATHETERIZATION  06/08/2020    Dr. Julieta Bocanegra), DAYANA to Diag 1    COLONOSCOPY N/A 2/1/2021    COLONOSCOPY DIAGNOSTIC performed by Pino Payne MD at [address]  2/3/2021    CT BONE MARROW BIOPSY 2/3/2021 Dayana Godinez MD [facility] CT SCAN    HYSTERECTOMY (CERVIX STATUS UNKNOWN)      Benign:no cervical cancer per pt'    KIDNEY REMOVAL      right    OTHER SURGICAL HISTORY Right     orif right ankle    TEMPORAL ARTERY BIOPSY Right 8/9/2021    RIGHT TEMPORAL ARTERY BIOPSY LIGATION performed by Annita Bryant MD at [address]  N/A 1/29/2021    EGD BIOPSY performed by Pino Payne MD at [address] [facility]   Allergen Reactions    Morphine Anaphylaxis and Hives     feels like throat is closing    Penicillins Hives and Swelling    Codeine Hives and Rash    Penicillin G Rash         Current Outpatient Medications: pioglitazone (ACTOS) 30 MG tablet, , Disp: , Rfl:     glimepiride (AMARYL) 4 MG tablet, Take 1 tablet by mouth every morning (before breakfast), Disp: 90 tablet, Rfl: 1    lisinopril (PRINIVIL;ZESTRIL) 40 MG tablet, TAKE 1 TABLET BY MOUTH EVERY DAY, Disp: 30 tablet, Rfl: 5    atorvastatin (LIPITOR) 20 MG tablet, TAKE 1 TABLET BY MOUTH EVERY DAY, Disp: 30 tablet, Rfl: 5    insulin glargine (LANTUS SOLOSTAR) 100 UNIT/ML injection pen, Inject 32 Units into the skin every morning (Patient taking differently: Inject 22 Units into the skin every morning), Disp: 5 pen, Rfl: 5    blood glucose test strips (TRUE METRIX BLOOD GLUCOSE TEST) strip, As needed. (Patient taking differently: 3 each daily), Disp: 200 strip, Rfl: 5    Insulin Pen Needle 32G X 4 MM MISC, 1 each by Does not apply route daily, Disp: 100 each, Rfl: 3    Liraglutide (VICTOZA) 18 MG/3ML SOPN SC injection, INJECT 1.8 MG UNDER THE SKIN ONCE DAILY, Disp: 2 pen, Rfl: 5    Blood Glucose Monitoring Suppl (TRUE METRIX METER) w/Device KIT, Used to  check blood sugar 3 times eyad, Disp: 1 kit, Rfl: 1    ticagrelor (BRILINTA) 90 MG TABS tablet, TAKE 1 TABLET BY MOUTH TWICE A DAY, Disp: 60 tablet, Rfl: 11    nitroGLYCERIN (NITROSTAT) 0.4 MG SL tablet, up to max of 3 total doses.  If no relief after 1 dose, call 911., Disp: 25 tablet, Rfl: 0    insulin lispro, 1 Unit Dial, 100 UNIT/ML SOPN, Inject 8 Units into the skin 3 times daily , Disp: , Rfl:     paliperidone (INVEGA) 9 MG extended release tablet, Take 9 mg by mouth every morning, Disp: , Rfl:     pantoprazole (PROTONIX) 40 MG tablet, Take 40 mg by mouth daily , Disp: , Rfl:     ferrous sulfate (IRON 325) 325 (65 Fe) MG tablet, Take 325 mg by mouth daily (with breakfast), Disp: , Rfl:     oxyCODONE-acetaminophen (PERCOCET) 7.5-325 MG per tablet, TAKE 1 TABLET BY MOUTH EVERY 6 (SIX) HOURS AS NEEDED FOR UP TO 28 DAYS., Disp: , Rfl:     clonazePAM (KLONOPIN) 0.5 MG tablet, Take 0.5 mg by mouth 3 times daily as needed. , Disp: , Rfl:     calcium carbonate-vitamin D (CALTRATE) 600-400 MG-UNIT TABS per tab, Take 1 tablet by mouth daily , Disp: , Rfl:     aspirin 81 MG tablet, Take 81 mg by mouth daily, Disp: , Rfl:     traZODone (DESYREL) 100 MG tablet, Take 200 mg by mouth nightly , Disp: , Rfl:     gabapentin (NEURONTIN) 600 MG tablet, Take 0.5 tablets by mouth 2 times daily for 3 days. (Patient taking differently: Take 600 mg by mouth 3 times daily. ), Disp: 90 tablet, Rfl: 3      Review of Systems:    Constitutional: Negative for fever, chills, and unexpected weight change. HENT: Negative for congestion, ear pain, rhinorrhea,  sore throat and trouble swallowing. Eyes: Negative for photophobia, redness, itching. Respiratory: Negative for cough, shortness of breath and sputum. Cardiovascular: Negative for chest pain, palpitations and leg swelling. Gastrointestinal: Negative for nausea, vomiting, abdominal pain, diarrhea, constipation. Endocrine: Negative for cold intolerance, heat intolerance, polydipsia, polyphagia and polyuria. Genitourinary: Negative for dysuria, urgency, frequency, hematuria and flank pain. Musculoskeletal: Negative for myalgias, back pain, arthralgias and neck pain. Skin/Nail: Negative for rash, itching. Normal nails. Neurological: Negative for seizures, weakness, light-headedness, numbness and headaches. Hematological/ Lymph nodes: Negative for adenopathy. Does not bruise/bleed easily. Psychiatric/Behavioral: Negative for suicidal ideas, depression, anxiety, sleep disturbance and decreased concentration. Objective:   Physical Exam:  BP (!) 159/87 (Site: Right Upper Arm, Position: Sitting, Cuff Size: Medium Adult)   Pulse 56   Ht 5' 3\" (1.6 m)   Wt 111 lb 9.6 oz (50.6 kg)   SpO2 99%   BMI 19.77 kg/m²     Constitutional: Patient is oriented to person, place, and time. Patient appears well-developed and well-nourished.    HENT:               Head: Normocephalic and atraumatic. Eyes: Conjunctivae and EOM are normal.                Neck: Normal range of motion. Neurological: Patient is alert and oriented to person, place, and time. Skin: Skin is warm and dry. Psychiatric: Patient has a normal mood and affect. Patient behavior is normal.     Lab Review:    Admission on 06/30/2022, Discharged on 06/30/2022   Component Date Value Ref Range Status    Ventricular Rate 06/30/2022 91  BPM Final    Atrial Rate 06/30/2022 91  BPM Final    P-R Interval 06/30/2022 140  ms Final    QRS Duration 06/30/2022 70  ms Final    Q-T Interval 06/30/2022 354  ms Final    QTc Calculation (Bazett) 06/30/2022 435  ms Final    P Axis 06/30/2022 71  degrees Final    R Axis 06/30/2022 -28  degrees Final    T Axis 06/30/2022 73  degrees Final    Diagnosis 06/30/2022 Normal sinus rhythmNormal ECGWhen compared with ECG of 31-MAR-2022 13:15,Vent.  rate has increased BY  37 BPMConfirmed by Robbi Mcmahan MD, Radha Mayberry ([id number]) on 7/1/2022 4:48:19 PM   Final    WBC 06/30/2022 3.9 (A)  4.0 - 11.0 K/uL Final    RBC 06/30/2022 3.23 (A)  4.00 - 5.20 M/uL Final    Hemoglobin 06/30/2022 9.2 (A)  12.0 - 16.0 g/dL Final    Hematocrit 06/30/2022 29.0 (A)  36.0 - 48.0 % Final    MCV 06/30/2022 89.8  80.0 - 100.0 fL Final    MCH 06/30/2022 28.5  26.0 - 34.0 pg Final    MCHC 06/30/2022 31.7  31.0 - 36.0 g/dL Final    RDW 06/30/2022 12.7  12.4 - 15.4 % Final    Platelets 18/72/2542 138  135 - 450 K/uL Final    MPV 06/30/2022 8.8  5.0 - 10.5 fL Final    Neutrophils % 06/30/2022 52.4  % Final    Lymphocytes % 06/30/2022 39.7  % Final    Monocytes % 06/30/2022 6.4  % Final    Eosinophils % 06/30/2022 1.3  % Final    Basophils % 06/30/2022 0.2  % Final    Neutrophils Absolute 06/30/2022 2.0  1.7 - 7.7 K/uL Final    Lymphocytes Absolute 06/30/2022 1.5  1.0 - 5.1 K/uL Final    Monocytes Absolute 06/30/2022 0.2  0.0 - 1.3 K/uL Final    Eosinophils Absolute 06/30/2022 0.1  0.0 - 0.6 K/uL Final    Basophils Absolute 06/30/2022 0.0  0.0 - 0.2 K/uL Final    Sodium 06/30/2022 135 (A)  136 - 145 mmol/L Final    Potassium 06/30/2022 4.6  3.5 - 5.1 mmol/L Final    Chloride 06/30/2022 98 (A)  99 - 110 mmol/L Final    CO2 06/30/2022 30  21 - 32 mmol/L Final    Anion Gap 06/30/2022 7  3 - 16 Final    Glucose 06/30/2022 491 (A)  70 - 99 mg/dL Final    BUN 06/30/2022 22 (A)  7 - 20 mg/dL Final    Creatinine 06/30/2022 1.3 (A)  0.6 - 1.2 mg/dL Final    GFR Non- 06/30/2022 41 (A)  >60 Final    GFR  06/30/2022 50 (A)  >60 Final    Calcium 06/30/2022 9.9  8.3 - 10.6 mg/dL Final    Total Protein 06/30/2022 7.2  6.4 - 8.2 g/dL Final    Albumin 06/30/2022 3.7  3.4 - 5.0 g/dL Final    Albumin/Globulin Ratio 06/30/2022 1.1  1.1 - 2.2 Final    Total Bilirubin 06/30/2022 <0.2  0.0 - 1.0 mg/dL Final    Alkaline Phosphatase 06/30/2022 159 (A)  40 - 129 U/L Final    ALT 06/30/2022 40  10 - 40 U/L Final    AST 06/30/2022 23  15 - 37 U/L Final    Color, UA 06/30/2022 Yellow  Straw/Yellow Final    Clarity, UA 06/30/2022 Clear  Clear Final    Glucose, Ur 06/30/2022 >=1000 (A)  Negative mg/dL Final    Bilirubin Urine 06/30/2022 Negative  Negative Final    Ketones, Urine 06/30/2022 Negative  Negative mg/dL Final    Specific Gravity, UA 06/30/2022 1.015  1.005 - 1.030 Final    Blood, Urine 06/30/2022 TRACE-LYSED (A)  Negative Final    pH, UA 06/30/2022 6.0  5.0 - 8.0 Final    Protein, UA 06/30/2022 30 (A)  Negative mg/dL Final    Urobilinogen, Urine 06/30/2022 0.2  <2.0 E.U./dL Final    Nitrite, Urine 06/30/2022 Negative  Negative Final    Leukocyte Esterase, Urine 06/30/2022 Negative  Negative Final    Microscopic Examination 06/30/2022 YES   Final    Urine Type 06/30/2022 NotGiven   Final    Troponin 06/30/2022 <0.01  <0.01 ng/mL Final    WBC, UA 06/30/2022 3-5  0 - 5 /HPF Final    RBC, UA 06/30/2022 0-2  0 - 4 /HPF Final    Epithelial Cells, UA 06/30/2022 0-1  0 - 5 /HPF Final   Admission on 03/31/2022, Discharged on 03/31/2022   Component Date Value Ref Range Status    Color, UA 03/31/2022 Yellow  Straw/Yellow Final    Clarity, UA 03/31/2022 Clear  Clear Final    Glucose, Ur 03/31/2022 >=1000 (A)  Negative mg/dL Final    Bilirubin Urine 03/31/2022 Negative  Negative Final    Ketones, Urine 03/31/2022 Negative  Negative mg/dL Final    Specific Gravity, UA 03/31/2022 <=1.005  1.005 - 1.030 Final    Blood, Urine 03/31/2022 Negative  Negative Final    pH, UA 03/31/2022 6.0  5.0 - 8.0 Final    Protein, UA 03/31/2022 Negative  Negative mg/dL Final    Urobilinogen, Urine 03/31/2022 0.2  <2.0 E.U./dL Final    Nitrite, Urine 03/31/2022 Negative  Negative Final    Leukocyte Esterase, Urine 03/31/2022 Negative  Negative Final    Microscopic Examination 03/31/2022 Not Indicated   Final    Urine Type 03/31/2022 NotGiven   Final    WBC, UA 03/31/2022 None seen  0 - 5 /HPF Final    RBC, UA 03/31/2022 None seen  0 - 4 /HPF Final    Renal Epithelial, UA 03/31/2022 0-1  0 - 1 /HPF Final    POC Glucose 03/31/2022 >600 (A)  70 - 99 mg/dl Final    Performed on 03/31/2022 ACCU-CHEK   Final    POC Glucose 03/31/2022 >600 (A)  70 - 99 mg/dl Final    Performed on 03/31/2022 ACCU-CHEK   Final    WBC 03/31/2022 5.4  4.0 - 11.0 K/uL Final    RBC 03/31/2022 3.61 (A)  4.00 - 5.20 M/uL Final    Hemoglobin 03/31/2022 10.1 (A)  12.0 - 16.0 g/dL Final    Hematocrit 03/31/2022 31.5 (A)  36.0 - 48.0 % Final    MCV 03/31/2022 87.3  80.0 - 100.0 fL Final    MCH 03/31/2022 28.0  26.0 - 34.0 pg Final    MCHC 03/31/2022 32.1  31.0 - 36.0 g/dL Final    RDW 03/31/2022 12.9  12.4 - 15.4 % Final    Platelets 13/01/1619 176  135 - 450 K/uL Final    MPV 03/31/2022 10.1  5.0 - 10.5 fL Final    Neutrophils % 03/31/2022 65.2  % Final    Lymphocytes % 03/31/2022 28.0  % Final    Monocytes % 03/31/2022 6.1  % Final    Eosinophils % 03/31/2022 0.4  % Final    Basophils % 03/31/2022 0.3  % Final    Neutrophils Absolute 03/31/2022 3.5  1.7 - 7.7 K/uL Final    Lymphocytes Absolute 03/31/2022 1.5  1.0 - 5.1 K/uL Final    Monocytes Absolute 03/31/2022 0.3  0.0 - 1.3 K/uL Final    Eosinophils Absolute 03/31/2022 0.0  0.0 - 0.6 K/uL Final    Basophils Absolute 03/31/2022 0.0  0.0 - 0.2 K/uL Final    Sodium 03/31/2022 129 (A)  136 - 145 mmol/L Final    Potassium reflex Magnesium 03/31/2022 5.0  3.5 - 5.1 mmol/L Final    Chloride 03/31/2022 92 (A)  99 - 110 mmol/L Final    CO2 03/31/2022 28  21 - 32 mmol/L Final    Anion Gap 03/31/2022 9  3 - 16 Final    Glucose 03/31/2022 668 (A)  70 - 99 mg/dL Final    BUN 03/31/2022 21 (A)  7 - 20 mg/dL Final    Creatinine 03/31/2022 1.6 (A)  0.6 - 1.2 mg/dL Final    GFR Non- 03/31/2022 33 (A)  >60 Final    GFR  03/31/2022 39 (A)  >60 Final    Calcium 03/31/2022 9.5  8.3 - 10.6 mg/dL Final    Total Protein 03/31/2022 7.6  6.4 - 8.2 g/dL Final    Albumin 03/31/2022 3.9  3.4 - 5.0 g/dL Final    Albumin/Globulin Ratio 03/31/2022 1.1  1.1 - 2.2 Final    Total Bilirubin 03/31/2022 0.4  0.0 - 1.0 mg/dL Final    Alkaline Phosphatase 03/31/2022 534 (A)  40 - 129 U/L Final    ALT 03/31/2022 92 (A)  10 - 40 U/L Final    AST 03/31/2022 36  15 - 37 U/L Final    Beta-Hydroxybutyrate 03/31/2022 0.44 (A)  0.00 - 0.27 mmol/L Final    Magnesium 03/31/2022 2.10  1.80 - 2.40 mg/dL Final    Phosphorus 03/31/2022 3.8  2.5 - 4.9 mg/dL Final    pH, Kyle 03/31/2022 7.355  7.350 - 7.450 Final    pCO2, Kyle 03/31/2022 53.5 (A)  40.0 - 50.0 mmHg Final    pO2, Kyle 03/31/2022 35.9  25.0 - 40.0 mmHg Final    HCO3, Venous 03/31/2022 29.2 (A)  23.0 - 29.0 mmol/L Final    Base Excess, Kyle 03/31/2022 2.8  -3.0 - 3.0 mmol/L Final    O2 Sat, Kyle 03/31/2022 68  Not Established % Final    Carboxyhemoglobin 03/31/2022 1.5  0.0 - 1.5 % Final    MetHgb, Kyle 03/31/2022 0.5  <1.5 % Final    TC02 (Calc), Kyle 03/31/2022 31  Not Established mmol/L Final    O2 Therapy 03/31/2022 Unknown   Final    Ventricular Rate 03/31/2022 54  BPM Final    Atrial Rate 03/31/2022 54 BPM Final    P-R Interval 03/31/2022 202  ms Final    QRS Duration 03/31/2022 76  ms Final    Q-T Interval 03/31/2022 444  ms Final    QTc Calculation (Bazett) 03/31/2022 421  ms Final    P Axis 03/31/2022 66  degrees Final    R Axis 03/31/2022 -15  degrees Final    T Bricelyn 03/31/2022 60  degrees Final    Diagnosis 03/31/2022 Sinus bradycardiaOtherwise normal ECGWhen compared with ECG of 14-FEB-2022 15:38,No significant change was foundConfirmed by Nunu Arroyo ([id number]) on 4/1/2022 4:18:11 PM   Final    POC Glucose 03/31/2022 446 (A)  70 - 99 mg/dl Final    Performed on 03/31/2022 ACCU-CHEK   Final    POC Glucose 03/31/2022 356 (A)  70 - 99 mg/dl Final    Performed on 03/31/2022 ACCU-CHEK   Final   Admission on 02/14/2022, Discharged on 02/16/2022   Component Date Value Ref Range Status    POC Glucose 02/14/2022 428 (A)  70 - 99 mg/dl Final    Performed on 02/14/2022 ACCU-CHEK   Final    WBC 02/14/2022 5.4  4.0 - 11.0 K/uL Final    RBC 02/14/2022 3.90 (A)  4.00 - 5.20 M/uL Final    Hemoglobin 02/14/2022 11.1 (A)  12.0 - 16.0 g/dL Final    Hematocrit 02/14/2022 33.9 (A)  36.0 - 48.0 % Final    MCV 02/14/2022 86.9  80.0 - 100.0 fL Final    MCH 02/14/2022 28.4  26.0 - 34.0 pg Final    MCHC 02/14/2022 32.7  31.0 - 36.0 g/dL Final    RDW 02/14/2022 12.4  12.4 - 15.4 % Final    Platelets 92/06/9624 136  135 - 450 K/uL Final    MPV 02/14/2022 9.7  5.0 - 10.5 fL Final    Neutrophils % 02/14/2022 65.6  % Final    Lymphocytes % 02/14/2022 27.8  % Final    Monocytes % 02/14/2022 5.4  % Final    Eosinophils % 02/14/2022 0.7  % Final    Basophils % 02/14/2022 0.5  % Final    Neutrophils Absolute 02/14/2022 3.6  1.7 - 7.7 K/uL Final    Lymphocytes Absolute 02/14/2022 1.5  1.0 - 5.1 K/uL Final    Monocytes Absolute 02/14/2022 0.3  0.0 - 1.3 K/uL Final    Eosinophils Absolute 02/14/2022 0.0  0.0 - 0.6 K/uL Final    Basophils Absolute 02/14/2022 0.0  0.0 - 0.2 K/uL Final    Sodium 02/14/2022 136  136 - 145 mmol/L Final    Potassium 02/14/2022 4.4  3.5 - 5.1 mmol/L Final    Chloride 02/14/2022 97 (A)  99 - 110 mmol/L Final    CO2 02/14/2022 27  21 - 32 mmol/L Final    Anion Gap 02/14/2022 12  3 - 16 Final    Glucose 02/14/2022 417 (A)  70 - 99 mg/dL Final    BUN 02/14/2022 19  7 - 20 mg/dL Final    Creatinine 02/14/2022 1.1  0.6 - 1.2 mg/dL Final    GFR Non- 02/14/2022 50 (A)  >60 Final    GFR  02/14/2022 >60  >60 Final    Calcium 02/14/2022 9.5  8.3 - 10.6 mg/dL Final    Total Protein 02/14/2022 7.0  6.4 - 8.2 g/dL Final    Albumin 02/14/2022 4.0  3.4 - 5.0 g/dL Final    Albumin/Globulin Ratio 02/14/2022 1.3  1.1 - 2.2 Final    Total Bilirubin 02/14/2022 <0.2  0.0 - 1.0 mg/dL Final    Alkaline Phosphatase 02/14/2022 215 (A)  40 - 129 U/L Final    ALT 02/14/2022 66 (A)  10 - 40 U/L Final    AST 02/14/2022 31  15 - 37 U/L Final    Sed Rate 02/14/2022 49 (A)  0 - 30 mm/Hr Final    CRP 02/14/2022 <3.0  0.0 - 5.1 mg/L Final    Troponin 02/14/2022 <0.01  <0.01 ng/mL Final    Ventricular Rate 02/14/2022 58  BPM Final    Atrial Rate 02/14/2022 58  BPM Final    P-R Interval 02/14/2022 158  ms Final    QRS Duration 02/14/2022 80  ms Final    Q-T Interval 02/14/2022 448  ms Final    QTc Calculation (Bazett) 02/14/2022 439  ms Final    P Axis 02/14/2022 80  degrees Final    R Axis 02/14/2022 -23  degrees Final    T Axis 02/14/2022 58  degrees Final    Diagnosis 02/14/2022 Sinus bradycardiaOtherwise normal ECGWhen compared with ECG of 02-NOV-2021 12:42,No significant change was foundConfirmed by Simeon Saleem MD, [facility] ([id number]) on 2/14/2022 6:56:54 PM   Final    pH, Kyle 02/14/2022 7.435  7.350 - 7.450 Final    pCO2, Kyle 02/14/2022 38.9 (A)  40.0 - 50.0 mmHg Final    pO2, Kyle 02/14/2022 55.2 (A)  25.0 - 40.0 mmHg Final    HCO3, Venous 02/14/2022 25.5  23.0 - 29.0 mmol/L Final    Base Excess, Kyle 02/14/2022 1.3  -3.0 - 3.0 mmol/L Final    O2 Sat, Kyle 02/14/2022 90  Not Established % Final    Carboxyhemoglobin 02/14/2022 4.5 (A) 0.0 - 1.5 % Final    MetHgb, Kyle 02/14/2022 0.3  <1.5 % Final    TC02 (Calc), Kyle 02/14/2022 27  Not Established mmol/L Final    O2 Therapy 02/14/2022 Unknown   Final    Specimen Status 02/14/2022 KIMMY   Final    Color, UA 02/14/2022 Yellow  Straw/Yellow Final    Clarity, UA 02/14/2022 Clear  Clear Final    Glucose, Ur 02/14/2022 >=1000 (A)  Negative mg/dL Final    Bilirubin Urine 02/14/2022 Negative  Negative Final    Ketones, Urine 02/14/2022 Negative  Negative mg/dL Final    Specific Gravity, UA 02/14/2022 1.015  1.005 - 1.030 Final    Blood, Urine 02/14/2022 TRACE-INTACT (A)  Negative Final    pH, UA 02/14/2022 6.5  5.0 - 8.0 Final    Protein, UA 02/14/2022 30 (A)  Negative mg/dL Final    Urobilinogen, Urine 02/14/2022 0.2  <2.0 E.U./dL Final    Nitrite, Urine 02/14/2022 Negative  Negative Final    Leukocyte Esterase, Urine 02/14/2022 Negative  Negative Final    Microscopic Examination 02/14/2022 YES   Final    Urine Type 02/14/2022 NotGiven   Final    WBC, UA 02/14/2022 0-2  0 - 5 /HPF Final    RBC, UA 02/14/2022 3-4  0 - 4 /HPF Final    Epithelial Cells, UA 02/14/2022 11-20 (A)  0 - 5 /HPF Final    Bacteria, UA 02/14/2022 Rare (A)  None Seen /HPF Final    Amphetamine Screen, Urine 02/14/2022 Neg  Negative <1000ng/mL Final    Barbiturate Screen, Ur 02/14/2022 Neg  Negative <200 ng/mL Final    Benzodiazepine Screen, Urine 02/14/2022 Neg  Negative <200 ng/mL Final    Cannabinoid Scrn, Ur 02/14/2022 Neg  Negative <50 ng/mL Final    Cocaine Metabolite Screen, Urine 02/14/2022 Neg  Negative <300 ng/mL Final    Opiate Scrn, Ur 02/14/2022 Neg  Negative <300 ng/mL Final    PCP Screen, Urine 02/14/2022 Neg  Negative <25 ng/mL Final    Methadone Screen, Urine 02/14/2022 Neg  Negative <300 ng/mL Final    Propoxyphene Scrn, Ur 02/14/2022 Neg  Negative <300 ng/mL Final    Oxycodone Urine 02/14/2022 POSITIVE (A)  Negative <100 ng/ml Final    pH, UA 02/14/2022 6.5   Final    Drug Screen Comment: 02/14/2022 see below   Final K/uL Final    Eosinophils Absolute 02/16/2022 0.0  0.0 - 0.6 K/uL Final    Basophils Absolute 02/16/2022 0.0  0.0 - 0.2 K/uL Final    Sodium 02/16/2022 133 (A)  136 - 145 mmol/L Final    Potassium 02/16/2022 5.0  3.5 - 5.1 mmol/L Final    Chloride 02/16/2022 99  99 - 110 mmol/L Final    CO2 02/16/2022 23  21 - 32 mmol/L Final    Anion Gap 02/16/2022 11  3 - 16 Final    Glucose 02/16/2022 274 (A)  70 - 99 mg/dL Final    BUN 02/16/2022 15  7 - 20 mg/dL Final    Creatinine 02/16/2022 1.3 (A)  0.6 - 1.2 mg/dL Final    GFR Non- 02/16/2022 41 (A)  >60 Final    GFR  02/16/2022 50 (A)  >60 Final    Calcium 02/16/2022 8.6  8.3 - 10.6 mg/dL Final    Magnesium 02/16/2022 2.00  1.80 - 2.40 mg/dL Final    POC Glucose 02/15/2022 230 (A)  70 - 99 mg/dl Final    Performed on 02/15/2022 ACCU-CHEK   Final    POC Glucose 02/16/2022 291 (A)  70 - 99 mg/dl Final    Performed on 02/16/2022 ACCU-CHEK   Final    POC Glucose 02/16/2022 262 (A)  70 - 99 mg/dl Final    Performed on 02/16/2022 ACCU-CHEK   Final    POC Glucose 02/16/2022 247 (A)  70 - 99 mg/dl Final    Performed on 02/16/2022 ACCU-CHEK   Final    POC Glucose 02/16/2022 185 (A)  70 - 99 mg/dl Final    Performed on 02/16/2022 ACCU-CHEK   Final    Rejected Test 02/16/2022 TSH FRT4   Final    Reason for Rejection 02/16/2022 see below   Final    TSH 02/16/2022 0.75  0.27 - 4.20 uIU/mL Final    T4 Free 02/16/2022 1.4  0.9 - 1.8 ng/dL Final   Admission on 11/22/2021, Discharged on 11/23/2021   Component Date Value Ref Range Status    POC Glucose 11/22/2021 384 (A)  70 - 99 mg/dl Final    Performed on 11/22/2021 ACCU-CHEK   Final    WBC 11/22/2021 4.8  4.0 - 11.0 K/uL Final    RBC 11/22/2021 3.68 (A)  4.00 - 5.20 M/uL Final    Hemoglobin 11/22/2021 10.7 (A)  12.0 - 16.0 g/dL Final    Hematocrit 11/22/2021 33.2 (A)  36.0 - 48.0 % Final    MCV 11/22/2021 90.4  80.0 - 100.0 fL Final    MCH 11/22/2021 29.1  26.0 - 34.0 pg Final    MCHC 11/22/2021 32.2  31.0 - 36.0 g/dL Final    RDW 11/22/2021 12.0 (A)  12.4 - 15.4 % Final    Platelets 71/25/0660 149  135 - 450 K/uL Final    MPV 11/22/2021 8.8  5.0 - 10.5 fL Final    Neutrophils % 11/22/2021 69.8  % Final    Lymphocytes % 11/22/2021 20.0  % Final    Monocytes % 11/22/2021 9.6  % Final    Eosinophils % 11/22/2021 0.4  % Final    Basophils % 11/22/2021 0.2  % Final    Neutrophils Absolute 11/22/2021 3.3  1.7 - 7.7 K/uL Final    Lymphocytes Absolute 11/22/2021 1.0  1.0 - 5.1 K/uL Final    Monocytes Absolute 11/22/2021 0.5  0.0 - 1.3 K/uL Final    Eosinophils Absolute 11/22/2021 0.0  0.0 - 0.6 K/uL Final    Basophils Absolute 11/22/2021 0.0  0.0 - 0.2 K/uL Final    Sodium 11/22/2021 134 (A)  136 - 145 mmol/L Final    Potassium reflex Magnesium 11/22/2021 3.8  3.5 - 5.1 mmol/L Final    Chloride 11/22/2021 98 (A)  99 - 110 mmol/L Final    CO2 11/22/2021 26  21 - 32 mmol/L Final    Anion Gap 11/22/2021 10  3 - 16 Final    Glucose 11/22/2021 306 (A)  70 - 99 mg/dL Final    BUN 11/22/2021 14  7 - 20 mg/dL Final    Creatinine 11/22/2021 1.1  0.6 - 1.2 mg/dL Final    GFR Non- 11/22/2021 50 (A)  >60 Final    GFR  11/22/2021 >60  >60 Final    Calcium 11/22/2021 9.3  8.3 - 10.6 mg/dL Final    Total Protein 11/22/2021 7.5  6.4 - 8.2 g/dL Final    Albumin 11/22/2021 3.6  3.4 - 5.0 g/dL Final    Albumin/Globulin Ratio 11/22/2021 0.9 (A)  1.1 - 2.2 Final    Total Bilirubin 11/22/2021 <0.2  0.0 - 1.0 mg/dL Final    Alkaline Phosphatase 11/22/2021 170 (A)  40 - 129 U/L Final    ALT 11/22/2021 57 (A)  10 - 40 U/L Final    AST 11/22/2021 24  15 - 37 U/L Final    Troponin 11/22/2021 <0.01  <0.01 ng/mL Final    Pro-BNP 11/22/2021 248 (A)  0 - 124 pg/mL Final    Rapid Influenza A Ag 11/22/2021 Negative  Negative Final    Rapid Influenza B Ag 11/22/2021 Negative  Negative Final    SARS-CoV-2, NAAT 11/22/2021 DETECTED (A)  Not Detected Final    POC Glucose 11/22/2021 223 (A)  70 - 99 mg/dl Final    Performed on 11/22/2021 ACCU-CHEK   Final   Admission on 11/02/2021, Discharged on 11/03/2021   Component Date Value Ref Range Status    Ventricular Rate 11/02/2021 78  BPM Final    Atrial Rate 11/02/2021 78  BPM Final    P-R Interval 11/02/2021 138  ms Final    QRS Duration 11/02/2021 74  ms Final    Q-T Interval 11/02/2021 376  ms Final    QTc Calculation (Bazett) 11/02/2021 428  ms Final    P Axis 11/02/2021 78  degrees Final    R Axis 11/02/2021 -40  degrees Final    T [place] 11/02/2021 72  degrees Final    Diagnosis 11/02/2021 Normal sinus rhythmLeft axis deviationAbnormal ECGWhen compared with ECG of 27-SEP-2021 19:47,No significant change was foundConfirmed by Neo Love ([id number]) on 11/2/2021 3:11:19 PM   Final    WBC 11/02/2021 5.1  4.0 - 11.0 K/uL Final    RBC 11/02/2021 3.61 (A)  4.00 - 5.20 M/uL Final    Hemoglobin 11/02/2021 10.6 (A)  12.0 - 16.0 g/dL Final    Hematocrit 11/02/2021 33.4 (A)  36.0 - 48.0 % Final    MCV 11/02/2021 92.5  80.0 - 100.0 fL Final    MCH 11/02/2021 29.3  26.0 - 34.0 pg Final    MCHC 11/02/2021 31.7  31.0 - 36.0 g/dL Final    RDW 11/02/2021 12.2 (A)  12.4 - 15.4 % Final    Platelets 12/37/2662 140  135 - 450 K/uL Final    MPV 11/02/2021 8.7  5.0 - 10.5 fL Final    Neutrophils % 11/02/2021 75.5  % Final    Lymphocytes % 11/02/2021 17.3  % Final    Monocytes % 11/02/2021 6.1  % Final    Eosinophils % 11/02/2021 0.8  % Final    Basophils % 11/02/2021 0.3  % Final    Neutrophils Absolute 11/02/2021 3.9  1.7 - 7.7 K/uL Final    Lymphocytes Absolute 11/02/2021 0.9 (A)  1.0 - 5.1 K/uL Final    Monocytes Absolute 11/02/2021 0.3  0.0 - 1.3 K/uL Final    Eosinophils Absolute 11/02/2021 0.0  0.0 - 0.6 K/uL Final    Basophils Absolute 11/02/2021 0.0  0.0 - 0.2 K/uL Final    Sodium 11/02/2021 134 (A)  136 - 145 mmol/L Final    Potassium 11/02/2021 4.8  3.5 - 5.1 mmol/L Final    Chloride 11/02/2021 97 (A)  99 - 110 mmol/L Final    CO2 11/02/2021 25  21 - 32 mmol/L Final    Anion Gap 11/02/2021 12  3 - 16 Final    Glucose 11/02/2021 663 (A)  70 - 99 mg/dL Final    BUN 11/02/2021 19  7 - 20 mg/dL Final    Creatinine 11/02/2021 1.1  0.6 - 1.2 mg/dL Final    GFR Non- 11/02/2021 50 (A)  >60 Final    GFR  11/02/2021 >60  >60 Final    Calcium 11/02/2021 9.2  8.3 - 10.6 mg/dL Final    Total Protein 11/02/2021 7.2  6.4 - 8.2 g/dL Final    Albumin 11/02/2021 3.6  3.4 - 5.0 g/dL Final    Albumin/Globulin Ratio 11/02/2021 1.0 (A)  1.1 - 2.2 Final    Total Bilirubin 11/02/2021 0.3  0.0 - 1.0 mg/dL Final    Alkaline Phosphatase 11/02/2021 154 (A)  40 - 129 U/L Final    ALT 11/02/2021 67 (A)  10 - 40 U/L Final    AST 11/02/2021 43 (A)  15 - 37 U/L Final    Troponin 11/02/2021 <0.01  <0.01 ng/mL Final    Lactic Acid 11/02/2021 2.2 (A)  0.4 - 2.0 mmol/L Final    pH, Kyle 11/02/2021 7.286 (A)  7.350 - 7.450 Final    pCO2, Kyle 11/02/2021 49.9  40.0 - 50.0 mmHg Final    pO2, Kyle 11/02/2021 46.8 (A)  25.0 - 40.0 mmHg Final    HCO3, Venous 11/02/2021 23.2  23.0 - 29.0 mmol/L Final    Base Excess, Kyle 11/02/2021 -3.6 (A)  -3.0 - 3.0 mmol/L Final    O2 Sat, Kyle 11/02/2021 80  Not Established % Final    Carboxyhemoglobin 11/02/2021 2.0 (A)  0.0 - 1.5 % Final    MetHgb, Kyle 11/02/2021 0.4  <1.5 % Final    TC02 (Calc), Kyle 11/02/2021 25  Not Established mmol/L Final    O2 Therapy 11/02/2021 Unknown   Final    Urine Culture, Routine 11/02/2021 <10,000 CFU/ml mixed skin/urogenital katherine.  No further workup   Final    Color, UA 11/02/2021 Yellow  Straw/Yellow Final    Clarity, UA 11/02/2021 Clear  Clear Final    Glucose, Ur 11/02/2021 >=1000 (A)  Negative mg/dL Final    Bilirubin Urine 11/02/2021 Negative  Negative Final    Ketones, Urine 11/02/2021 Negative  Negative mg/dL Final    Specific Gravity, UA 11/02/2021 1.015  1.005 - 1.030 Final    Blood, Urine 11/02/2021 Negative  Negative Final    pH, UA 11/02/2021 5.5  5.0 - 8.0 Final    Protein, UA 11/02/2021 Negative  Negative mg/dL Final    Urobilinogen, Urine 11/02/2021 0.2  <2.0 E.U./dL Final    Nitrite, Urine 11/02/2021 Negative  Negative Final    Leukocyte Esterase, Urine 11/02/2021 Negative  Negative Final    Microscopic Examination 11/02/2021 Not Indicated   Final    Urine Type 11/02/2021 NotGiven   Final    POC Glucose 11/02/2021 360 (A)  70 - 99 mg/dl Final    Performed on 11/02/2021 ACCU-CHEK   Final    Troponin 11/02/2021 <0.01  <0.01 ng/mL Final    Troponin 11/02/2021 <0.01  <0.01 ng/mL Final    WBC 11/03/2021 5.2  4.0 - 11.0 K/uL Final    RBC 11/03/2021 3.58 (A)  4.00 - 5.20 M/uL Final    Hemoglobin 11/03/2021 10.5 (A)  12.0 - 16.0 g/dL Final    Hematocrit 11/03/2021 33.0 (A)  36.0 - 48.0 % Final    MCV 11/03/2021 92.3  80.0 - 100.0 fL Final    MCH 11/03/2021 29.3  26.0 - 34.0 pg Final    MCHC 11/03/2021 31.8  31.0 - 36.0 g/dL Final    RDW 11/03/2021 12.4  12.4 - 15.4 % Final    Platelets 65/62/2575 131 (A)  135 - 450 K/uL Final    MPV 11/03/2021 8.6  5.0 - 10.5 fL Final    Cholesterol, Total 11/03/2021 99  0 - 199 mg/dL Final    Triglycerides 11/03/2021 62  0 - 150 mg/dL Final    HDL 11/03/2021 55  40 - 60 mg/dL Final    LDL Calculated 11/03/2021 32  <100 mg/dL Final    VLDL Cholesterol Calculated 11/03/2021 12  Not Established mg/dL Final    Sodium 11/03/2021 139  136 - 145 mmol/L Final    Potassium reflex Magnesium 11/03/2021 4.6  3.5 - 5.1 mmol/L Final    Chloride 11/03/2021 105  99 - 110 mmol/L Final    CO2 11/03/2021 25  21 - 32 mmol/L Final    Anion Gap 11/03/2021 9  3 - 16 Final    Glucose 11/03/2021 167 (A)  70 - 99 mg/dL Final    BUN 11/03/2021 15  7 - 20 mg/dL Final    Creatinine 11/03/2021 0.9  0.6 - 1.2 mg/dL Final    GFR Non- 11/03/2021 >60  >60 Final    GFR  11/03/2021 >60  >60 Final    Calcium 11/03/2021 8.6  8.3 - 10.6 mg/dL Final    Lactic Acid 11/02/2021 2.8 (A)  0.4 - 2.0 mmol/L Final    POC Glucose 11/02/2021 250 (A)  70 - 99 mg/dl Final    Performed on 11/02/2021 ACCU-CHEK   Final    Hemoglobin A1C 11/02/2021 11.3  See comment % Final    eAG 11/02/2021 277.6  mg/dL Final    POC Glucose 11/02/2021 325 (A)  70 - 99 mg/dl Final    Performed on 11/02/2021 ACCU-CHEK   Final    POC Glucose 11/03/2021 48 (A)  70 - 99 mg/dl Final    Performed on 11/03/2021 ACCU-CHEK   Final    POC Glucose 11/03/2021 114 (A)  70 - 99 mg/dl Final    Performed on 11/03/2021 ACCU-CHEK   Final    Lactic Acid 11/03/2021 2.7 (A)  0.4 - 2.0 mmol/L Final    POC Glucose 11/03/2021 52 (A)  70 - 99 mg/dl Final    Performed on 11/03/2021 ACCU-CHEK   Final    POC Glucose 11/03/2021 100 (A)  70 - 99 mg/dl Final    Performed on 11/03/2021 ACCU-CHEK   Final    POC Glucose 11/03/2021 175 (A)  70 - 99 mg/dl Final    Performed on 11/03/2021 ACCU-CHEK   Final   Admission on 09/27/2021, Discharged on 09/29/2021   Component Date Value Ref Range Status    Ventricular Rate 09/27/2021 62  BPM Final    Atrial Rate 09/27/2021 62  BPM Final    P-R Interval 09/27/2021 150  ms Final    QRS Duration 09/27/2021 78  ms Final    Q-T Interval 09/27/2021 418  ms Final    QTc Calculation (Bazett) 09/27/2021 424  ms Final    P Axis 09/27/2021 57  degrees Final    R Axis 09/27/2021 -17  degrees Final    T Axis 09/27/2021 39  degrees Final    Diagnosis 09/27/2021 Normal sinus rhythmNormal ECGWhen compared with ECG of 08-AUG-2021 14:40,No significant change was foundConfirmed by Sharonda Mejía MD, Ophelia Raza ([id number]) on 9/28/2021 7:28:41 PM   Final    WBC 09/27/2021 5.1  4.0 - 11.0 K/uL Final    RBC 09/27/2021 3.68 (A)  4.00 - 5.20 M/uL Final    Hemoglobin 09/27/2021 10.6 (A)  12.0 - 16.0 g/dL Final    Hematocrit 09/27/2021 32.2 (A)  36.0 - 48.0 % Final    MCV 09/27/2021 87.4  80.0 - 100.0 fL Final    MCH 09/27/2021 28.8  26.0 - 34.0 pg Final    MCHC 09/27/2021 32.9  31.0 - 36.0 g/dL Final    RDW 09/27/2021 13.0  12.4 - 15.4 % Final    Platelets 46/89/2682 173  135 - 450 K/uL Final    MPV 09/27/2021 8.8  5.0 - 10.5 fL Final    Neutrophils % 09/27/2021 57.0  % Final    Lymphocytes % 09/27/2021 35.7  % Final    Monocytes % 09/27/2021 5.8  % Final    Eosinophils % 09/27/2021 0.9  % Final    Basophils % 09/27/2021 0.6  % Final    Neutrophils Absolute 09/27/2021 2.9  1.7 - 7.7 K/uL Final    Lymphocytes Absolute 09/27/2021 1.8  1.0 - 5.1 K/uL Final    Monocytes Absolute 09/27/2021 0.3  0.0 - 1.3 K/uL Final    Eosinophils Absolute 09/27/2021 0.0  0.0 - 0.6 K/uL Final    Basophils Absolute 09/27/2021 0.0  0.0 - 0.2 K/uL Final    Troponin 09/27/2021 <0.01  <0.01 ng/mL Final    Pro-BNP 09/27/2021 350 (A)  0 - 124 pg/mL Final    SARS-CoV-2, NAAT 09/27/2021 Not Detected  Not Detected Final    pH, Kyle 09/27/2021 7.435  7.350 - 7.450 Final    pCO2, Kyle 09/27/2021 33.7 (A)  40.0 - 50.0 mmHg Final    pO2, Kyle 09/27/2021 177.6 (A)  25.0 - 40.0 mmHg Final    HCO3, Venous 09/27/2021 22.1 (A)  23.0 - 29.0 mmol/L Final    Base Excess, Kyle 09/27/2021 -1.5  -3.0 - 3.0 mmol/L Final    O2 Sat, Kyle 09/27/2021 99  Not Established % Final    Carboxyhemoglobin 09/27/2021 4.9 (A)  0.0 - 1.5 % Final    MetHgb, Kyle 09/27/2021 0.6  <1.5 % Final    TC02 (Calc), Kyle 09/27/2021 23  Not Established mmol/L Final    O2 Therapy 09/27/2021 Unknown   Final    Lactic Acid 09/28/2021 1.1  0.4 - 2.0 mmol/L Final    Sodium 09/27/2021 130 (A)  136 - 145 mmol/L Final    Potassium 09/27/2021 4.6  3.5 - 5.1 mmol/L Final    Chloride 09/27/2021 97 (A)  99 - 110 mmol/L Final    CO2 09/27/2021 24  21 - 32 mmol/L Final    Anion Gap 09/27/2021 9  3 - 16 Final    Glucose 09/27/2021 451 (A)  70 - 99 mg/dL Final    BUN 09/27/2021 22 (A)  7 - 20 mg/dL Final    Creatinine 09/27/2021 1.2  0.6 - 1.2 mg/dL Final    GFR Non- 09/27/2021 45 (A)  >60 Final    GFR  09/27/2021 55 (A)  >60 Final    Calcium 09/27/2021 9.6  8.3 - 10.6 mg/dL Final    Total Protein 09/27/2021 8.1  6.4 - 8.2 g/dL Final    Albumin 09/27/2021 4.3  3.4 - 5.0 g/dL Final    Albumin/Globulin Ratio 09/27/2021 1.1  1.1 - 2.2 Final    Total Bilirubin 09/27/2021 0.3  0.0 - 1.0 mg/dL Final    Alkaline Phosphatase 09/27/2021 166 (A)  40 - 129 U/L Final    ALT 09/27/2021 50 (A)  10 - 40 U/L Final    AST 09/27/2021 31  15 - 37 U/L Final    Globulin 09/27/2021 3.8  g/dL Final    Magnesium 09/27/2021 2.30  1.80 - 2.40 mg/dL Final    POC Glucose 09/27/2021 452 (A)  70 - 99 mg/dl Final    Performed on 09/27/2021 ACCU-CHEK   Final    Color, UA 09/27/2021 Straw  Straw/Yellow Final    Clarity, UA 09/27/2021 SL CLOUDY (A)  Clear Final    Glucose, Ur 09/27/2021 >=1000 (A)  Negative mg/dL Final    Bilirubin Urine 09/27/2021 Negative  Negative Final    Ketones, Urine 09/27/2021 Negative  Negative mg/dL Final    Specific Gravity, UA 09/27/2021 1.010  1.005 - 1.030 Final    Blood, Urine 09/27/2021 TRACE-INTACT (A)  Negative Final    pH, UA 09/27/2021 5.5  5.0 - 8.0 Final    Protein, UA 09/27/2021 Negative  Negative mg/dL Final    Urobilinogen, Urine 09/27/2021 0.2  <2.0 E.U./dL Final    Nitrite, Urine 09/27/2021 Negative  Negative Final    Leukocyte Esterase, Urine 09/27/2021 Negative  Negative Final    Microscopic Examination 09/27/2021 YES   Final    Urine Type 09/27/2021 NotGiven   Final    Troponin 09/28/2021 <0.01  <0.01 ng/mL Final    WBC, UA 09/27/2021 6-9 (A)  0 - 5 /HPF Final    RBC, UA 09/27/2021 3-4  0 - 4 /HPF Final    Epithelial Cells, UA 09/27/2021 2-5  0 - 5 /HPF Final    Bacteria, UA 09/27/2021 Rare (A)  None Seen /HPF Final    Amorphous, UA 09/27/2021 Rare  /HPF Final    POC Glucose 09/28/2021 322 (A)  70 - 99 mg/dl Final    Performed on 09/28/2021 ACCU-CHEK   Final    POC Glucose 09/28/2021 133 (A)  70 - 99 mg/dl Final    Performed on 09/28/2021 ACCU-CHEK   Final    Occult Blood Screening 09/28/2021    Final                    Value:Result: Negative  Normal range: Negative      SARS-CoV-2, NAAT 09/28/2021 Not Detected  Not Detected Final    POC Glucose 09/28/2021 154 (A)  70 - 99 mg/dl Final    Performed on 09/28/2021 ACCU-CHEK   Final    POC Glucose 09/28/2021 157 (A)  70 - 99 mg/dl Final    Performed on 09/28/2021 ACCU-CHEK   Final    POC Glucose 09/28/2021 176 (A)  70 - 99 mg/dl Final    Performed on 09/28/2021 ACCU-CHEK   Final    Sodium 09/29/2021 139  136 - 145 mmol/L Final    Potassium reflex Magnesium 09/29/2021 3.6  3.5 - 5.1 mmol/L Final    Chloride 09/29/2021 103  99 - 110 mmol/L Final    CO2 09/29/2021 26  21 - 32 mmol/L Final    Anion Gap 09/29/2021 10  3 - 16 Final    Glucose 09/29/2021 98  70 - 99 mg/dL Final    BUN 09/29/2021 13  7 - 20 mg/dL Final    Creatinine 09/29/2021 1.2  0.6 - 1.2 mg/dL Final    GFR Non- 09/29/2021 45 (A)  >60 Final    GFR  09/29/2021 55 (A)  >60 Final    Calcium 09/29/2021 9.5  8.3 - 10.6 mg/dL Final    Total Protein 09/29/2021 7.0  6.4 - 8.2 g/dL Final    Albumin 09/29/2021 3.8  3.4 - 5.0 g/dL Final    Albumin/Globulin Ratio 09/29/2021 1.2  1.1 - 2.2 Final    Total Bilirubin 09/29/2021 0.6  0.0 - 1.0 mg/dL Final    Alkaline Phosphatase 09/29/2021 138 (A)  40 - 129 U/L Final    ALT 09/29/2021 39  10 - 40 U/L Final    AST 09/29/2021 27  15 - 37 U/L Final    Globulin 09/29/2021 3.2  g/dL Final    WBC 09/29/2021 5.4  4.0 - 11.0 K/uL Final    RBC 09/29/2021 3.83 (A)  4.00 - 5.20 M/uL Final    Hemoglobin 09/29/2021 10.9 (A)  12.0 - 16.0 g/dL Final    Hematocrit 09/29/2021 33.7 (A)  36.0 - 48.0 % Final    MCV 09/29/2021 88.1  80.0 - 100.0 fL Final    MCH 09/29/2021 28.5  26.0 - 34.0 pg Final    MCHC 09/29/2021 32.3  31.0 - 36.0 g/dL Final    RDW 09/29/2021 13.3  12.4 - 15.4 % Final    Platelets 28/05/6675 161  135 - 450 K/uL Final    MPV 09/29/2021 8.7  5.0 - 10.5 fL Final    Neutrophils % 09/29/2021 37.6  % Final    Lymphocytes % 09/29/2021 50.9  % Final    Monocytes % 09/29/2021 9.6  % Final    Eosinophils % 09/29/2021 1.5  % Final    Basophils % 09/29/2021 0.4  % Final    Neutrophils Absolute 09/29/2021 2.0  1.7 - 7.7 K/uL Final    Lymphocytes Absolute 09/29/2021 2.7  1.0 - 5.1 K/uL Final    Monocytes Absolute 09/29/2021 0.5  0.0 - 1.3 K/uL Final    Eosinophils Absolute 09/29/2021 0.1  0.0 - 0.6 K/uL Final    Basophils Absolute 09/29/2021 0.0  0.0 - 0.2 K/uL Final    POC Glucose 09/28/2021 480 (A)  70 - 99 mg/dl Final    Performed on 09/28/2021 ACCU-CHEK   Final    POC Glucose 09/28/2021 337 (A)  70 - 99 mg/dl Final    Performed on 09/28/2021 ACCU-CHEK   Final    POC Glucose 09/29/2021 80  70 - 99 mg/dl Final    Performed on 09/29/2021 ACCU-CHEK   Final    POC Glucose 09/29/2021 132 (A)  70 - 99 mg/dl Final    Performed on 09/29/2021 ACCU-CHEK   Final    Left Ventricular Ejection Fraction 09/29/2021 60   Final-Edited    LVEF MODALITY 09/29/2021 CATH   Final-Edited    Hemoglobin A1C 09/29/2021 14.4  See comment % Final    eAG 09/29/2021 366.6  mg/dL Final    POC Glucose 09/29/2021 414 (A)  70 - 99 mg/dl Final    Performed on 09/29/2021 ACCU-CHEK   Final    POC ACT LR 09/29/2021 251  Not Established sec Final           Assessment and Plan     Devendra Hooper was seen today for follow-up and diabetes. Diagnoses and all orders for this visit:    Uncontrolled type 2 diabetes mellitus with microalbuminuria, with long-term current use of insulin  -     glimepiride (AMARYL) 4 MG tablet; Take 1 tablet by mouth every morning (before breakfast)  -     Hemoglobin A1C; Future  -     Lipid Panel; Future  -     Comprehensive Metabolic Panel; Future  -     Microalbumin / Creatinine Urine Ratio; Future    Type 2 diabetes mellitus with other diabetic kidney complication (HCC)  -     glimepiride (AMARYL) 4 MG tablet; Take 1 tablet by mouth every morning (before breakfast)  -     Hemoglobin A1C; Future  -     Lipid Panel; Future  -     Comprehensive Metabolic Panel; Future  -     Microalbumin / Creatinine Urine Ratio; Future    Type 2 diabetes mellitus with diabetic nephropathy, with long-term current use of insulin (HCC)  -     glimepiride (AMARYL) 4 MG tablet;  Take 1 tablet by mouth every morning (before breakfast)  -     Hemoglobin A1C; Future  -     Lipid Panel; Future  -     Comprehensive Metabolic Panel; Future  -     Microalbumin / Creatinine Urine Ratio; Future    Uncontrolled type 2 diabetes mellitus with diabetic nephropathy, with long-term current use of insulin  -     glimepiride (AMARYL) 4 MG tablet; Take 1 tablet by mouth every morning (before breakfast)  -     Hemoglobin A1C; Future  -     Lipid Panel; Future  -     Comprehensive Metabolic Panel; Future  -     Microalbumin / Creatinine Urine Ratio; Future    Essential (primary) hypertension  -     glimepiride (AMARYL) 4 MG tablet; Take 1 tablet by mouth every morning (before breakfast)  -     Hemoglobin A1C; Future  -     Lipid Panel; Future  -     Comprehensive Metabolic Panel; Future  -     Microalbumin / Creatinine Urine Ratio; Future        1: Type 2 DM complicated with nephropathy, neuropathy TIAs, carotid stenosis   Uncontrolled A1C 14.9% < 14.4%   < 15.5%   < 15.3% <  9.3% (after transfusion) < 11.9% < 7.3% < 8.4%<  6.8% < 10.1%  << 8.1 <  8% < 11.7%    Cr 1.3, GFR 46 - May 2022   A1C of <8 would be acceptable because of microvascular and macrovascular complications.      Questionable adherence to medical plan, treatment adherence and diet plan   Zak Boyle not approved      Need to bring meds on every visit    Bring meter every visit   Adherence to medications is poor     Blood sugars are very high     Restart glimepiride 4 mg with breakfast        Keep Lantus 22 units daily in am.  Victoza 1.8 mg daily in AM    C/w Novolog SS                     With meals (during the day)   - at bedtime    < 150 ---     5 units                                 0 units  151-200 --  6 units                                 0 units  201-250 :    7 units                                 1 units  251-300:     8 units                                 2 units  301-350:     9 units                                 3 units  >350 :         10 units                               4 units    Reiterated importance of taking care meds to control diabetes     All instructions provided in written. Check Blood sugars 4 times per day. Log them along with insulin and send them every 2 weeks. Call for blood sugars less than 60 or more than 400. Eye exam: Last exam in March 12th 2018, Needs an exam now. Foot exam:  March 2020    Deformity/amputation: absent  Skin lesions/pre-ulcerative calluses: absent  Edema: right- negative, left- negative  Sensory exam: Monofilament sensation: normal  Plus at least one of the following:   Pulses: normal,   Vibration (128 Hz): Moderately decreased     Renal screen: High 68 Feb 2018, high 47 Jan 2019  > 40 June 2020     TSH screen: Feb 2022   Saw CDE in 2016 with Rd.     2: HTN   Controlled     3: Hyperlipidemia   LDL: 33, HDL: 39, TGs: 118 June 2020      Atorvastatin 10 mg daily     4: Unintentional weigh tloss - good appetite   TSH, FT4, am cortisol normal - Nov 2020    TSH, Free T4 normal Feb 2022   Following up with pcp     Labs today:   A1C, lipids, MACR, CMP   Nonfasting labs       RTC in 6 weeks     EDUCATION:   Greater than 50% of this follow-up visit was spent in general counseling regarding   obesity, diet, exercise, importance of adherence to insulin regime, recognition and treatment of hypo and hyperglycemia,  glucose logging, proper diabetes management, diabetic complications with poor management and the importance of glycemic control in order to avoid the complications of diabetes. Risks and potential complications of diabetes were reviewed with the patient. Diabetes health maintenance plan and follow-up were discussed and understood by the patient. We reviewed the importance of medication compliance and regular follow-up. Aggressive lifestyle modification was encouraged. Exercise Counselling: This patient is a candidate for regular physical exercise.  Instructions to perform the following types of exercise:  Swimming or water aerobic exercise  Brisk walking  Playing tennis  Stationary bicycle or elliptical indoor  Low impact aerobic exercise    Instructions given to exercise for the following duration:  30 minutes a day for five-seven days per week.     Following instructions for being active throughout the day in addition to formal exercise:  Walk instead of drive whenever possible  Take the stairs instead of the elevator  Work in the garden  Park to the far end of the parking lot to add more walking steps to destination      Electronically signed by Dilia Hopper MD on 9/27/2022 at 9:08 AM

## 2022-09-28 LAB
ESTIMATED AVERAGE GLUCOSE: 380.9 MG/DL
HBA1C MFR BLD: 14.9 %

## 2022-10-26 ENCOUNTER — TELEPHONE (OUTPATIENT)
Dept: ENDOCRINOLOGY | Age: 62
End: 2022-10-26

## 2022-10-26 DIAGNOSIS — E11.49 DIABETIC NEUROPATHY WITH NEUROLOGIC COMPLICATION (HCC): ICD-10-CM

## 2022-10-26 DIAGNOSIS — I10 ESSENTIAL (PRIMARY) HYPERTENSION: ICD-10-CM

## 2022-10-26 DIAGNOSIS — E11.49 TYPE II DIABETES MELLITUS WITH NEUROLOGICAL MANIFESTATIONS (HCC): ICD-10-CM

## 2022-10-26 DIAGNOSIS — Z79.4 TYPE 2 DIABETES MELLITUS WITH DIABETIC NEPHROPATHY, WITH LONG-TERM CURRENT USE OF INSULIN (HCC): ICD-10-CM

## 2022-10-26 DIAGNOSIS — E11.40 DIABETIC NEUROPATHY WITH NEUROLOGIC COMPLICATION (HCC): ICD-10-CM

## 2022-10-26 DIAGNOSIS — E11.29 TYPE 2 DIABETES MELLITUS WITH OTHER DIABETIC KIDNEY COMPLICATION (HCC): ICD-10-CM

## 2022-10-26 DIAGNOSIS — E11.40 CONTROLLED TYPE 2 DIABETES MELLITUS WITH DIABETIC NEUROPATHY, UNSPECIFIED WHETHER LONG TERM INSULIN USE (HCC): ICD-10-CM

## 2022-10-26 DIAGNOSIS — E11.21 TYPE 2 DIABETES MELLITUS WITH DIABETIC NEPHROPATHY, WITH LONG-TERM CURRENT USE OF INSULIN (HCC): ICD-10-CM

## 2022-10-26 NOTE — TELEPHONE ENCOUNTER
Ellen Harding from [employer] called   He stated that pt's PCP Elvia Winkler will be reaching out to the office to clarify whom is to prescribe pt's DM medications     Ellen Harding stated that currently both Dr Michelle Goodman and POONAM Winkler are both prescribing DM medication for pt   He stated that the pt's PCP was unaware of this  Ellen Harding stated that you can give him a call back if you need to speak with him   Ellen Harding can be reached at [phone]    He also stated that you can reach out to Elvia Winkler NP  to clarify Rx's   POONAM Winkler can be reached at [phone]    Please advise

## 2022-10-26 NOTE — TELEPHONE ENCOUNTER
Left a message for [facility] SANDOVAL to call me back to discuss further.

## 2022-10-27 RX ORDER — INSULIN GLARGINE 100 [IU]/ML
22 INJECTION, SOLUTION SUBCUTANEOUS EVERY MORNING
Qty: 5 ADJUSTABLE DOSE PRE-FILLED PEN SYRINGE | Refills: 3 | Status: SHIPPED | OUTPATIENT
Start: 2022-10-27

## 2022-10-27 RX ORDER — GLIMEPIRIDE 4 MG/1
TABLET ORAL
Qty: 90 TABLET | Refills: 1 | Status: SHIPPED | OUTPATIENT
Start: 2022-10-27

## 2022-10-27 RX ORDER — LIRAGLUTIDE 6 MG/ML
INJECTION SUBCUTANEOUS
Qty: 3 ADJUSTABLE DOSE PRE-FILLED PEN SYRINGE | Refills: 3 | Status: SHIPPED | OUTPATIENT
Start: 2022-10-27

## 2022-10-27 RX ORDER — INSULIN LISPRO 100 [IU]/ML
INJECTION, SOLUTION INTRAVENOUS; SUBCUTANEOUS
Qty: 5 ADJUSTABLE DOSE PRE-FILLED PEN SYRINGE | Refills: 2 | Status: SHIPPED | OUTPATIENT
Start: 2022-10-27

## 2022-10-27 NOTE — TELEPHONE ENCOUNTER
[email or web address]. Petey Leah is not available but spoke with Liudmila Doyle. Liudmila Doyle stated they recently took over medication refills for Mrs. Manfred Holliday. The concern is they conflicting information with prescriptions from Dr. Dhara Morgan and PCP. Mrs. Fried received two refills of the Glimepiride this month one for 2 mg and the other is 4 mg. Liudmila Doyle will call Stacy Parsons to discuss this. Pharmacy is requesting all refill managed by Dr. Dhara Morgan sent in today.

## 2022-11-03 NOTE — PROGRESS NOTES
Eden Matos is sitting up now eating crackers, states feels okay. [No Acute Distress] : no acute distress [Normal Sclera/Conjunctiva] : normal sclera/conjunctiva [No Lymphadenopathy] : no lymphadenopathy [No Respiratory Distress] : no respiratory distress  [Clear to Auscultation] : lungs were clear to auscultation bilaterally [Increased E/I Ratio] : the expiratory/inspiratory ratio was increased [No Edema] : there was no peripheral edema [Normal] : affect was normal and insight and judgment were intact

## 2022-11-11 ENCOUNTER — OFFICE VISIT (OUTPATIENT)
Dept: ENDOCRINOLOGY | Age: 62
End: 2022-11-11
Payer: COMMERCIAL

## 2022-11-11 VITALS
BODY MASS INDEX: 20.06 KG/M2 | DIASTOLIC BLOOD PRESSURE: 73 MMHG | HEIGHT: 63 IN | HEART RATE: 72 BPM | SYSTOLIC BLOOD PRESSURE: 161 MMHG | OXYGEN SATURATION: 100 % | WEIGHT: 113.2 LBS

## 2022-11-11 DIAGNOSIS — E11.49 DIABETIC NEUROPATHY WITH NEUROLOGIC COMPLICATION (HCC): Primary | ICD-10-CM

## 2022-11-11 DIAGNOSIS — E11.40 DIABETIC NEUROPATHY WITH NEUROLOGIC COMPLICATION (HCC): Primary | ICD-10-CM

## 2022-11-11 DIAGNOSIS — E78.5 DYSLIPIDEMIA ASSOCIATED WITH TYPE 2 DIABETES MELLITUS (HCC): ICD-10-CM

## 2022-11-11 DIAGNOSIS — E11.69 DYSLIPIDEMIA ASSOCIATED WITH TYPE 2 DIABETES MELLITUS (HCC): ICD-10-CM

## 2022-11-11 DIAGNOSIS — E11.42 DIABETIC POLYNEUROPATHY ASSOCIATED WITH TYPE 2 DIABETES MELLITUS (HCC): ICD-10-CM

## 2022-11-11 DIAGNOSIS — Z79.4 TYPE 2 DIABETES MELLITUS WITH HYPERGLYCEMIA, WITH LONG-TERM CURRENT USE OF INSULIN (HCC): ICD-10-CM

## 2022-11-11 DIAGNOSIS — E11.59 TYPE 2 DIABETES MELLITUS WITH VASCULAR DISEASE (HCC): ICD-10-CM

## 2022-11-11 DIAGNOSIS — E11.65 TYPE 2 DIABETES MELLITUS WITH HYPERGLYCEMIA, WITH LONG-TERM CURRENT USE OF INSULIN (HCC): ICD-10-CM

## 2022-11-11 PROBLEM — E11.10 DKA (DIABETIC KETOACIDOSES): Status: RESOLVED | Noted: 2020-10-07 | Resolved: 2022-11-11

## 2022-11-11 PROCEDURE — 3017F COLORECTAL CA SCREEN DOC REV: CPT | Performed by: INTERNAL MEDICINE

## 2022-11-11 PROCEDURE — 3078F DIAST BP <80 MM HG: CPT | Performed by: INTERNAL MEDICINE

## 2022-11-11 PROCEDURE — 2022F DILAT RTA XM EVC RTNOPTHY: CPT | Performed by: INTERNAL MEDICINE

## 2022-11-11 PROCEDURE — G8420 CALC BMI NORM PARAMETERS: HCPCS | Performed by: INTERNAL MEDICINE

## 2022-11-11 PROCEDURE — G8484 FLU IMMUNIZE NO ADMIN: HCPCS | Performed by: INTERNAL MEDICINE

## 2022-11-11 PROCEDURE — 3074F SYST BP LT 130 MM HG: CPT | Performed by: INTERNAL MEDICINE

## 2022-11-11 PROCEDURE — 3046F HEMOGLOBIN A1C LEVEL >9.0%: CPT | Performed by: INTERNAL MEDICINE

## 2022-11-11 PROCEDURE — G8427 DOCREV CUR MEDS BY ELIG CLIN: HCPCS | Performed by: INTERNAL MEDICINE

## 2022-11-11 PROCEDURE — 99214 OFFICE O/P EST MOD 30 MIN: CPT | Performed by: INTERNAL MEDICINE

## 2022-11-11 PROCEDURE — 1036F TOBACCO NON-USER: CPT | Performed by: INTERNAL MEDICINE

## 2022-11-11 NOTE — PATIENT INSTRUCTIONS
Change Glimepiride 4 mg with breakfast or first meal of the day     Change Lantus 22 units daily in am.  Victoza 1.8 mg daily in AM    C/w Novolog SS                     With meals (during the day)   - at bedtime    < 150 ---     5 units                                 0 units  151-200 --  6 units                                 0 units  201-250 :    7 units                                 1 units  251-300:     8 units                                 2 units  301-350:     9 units                                 3 units  >350 :         10 units                               4 units

## 2022-11-11 NOTE — PROGRESS NOTES
Patient ID:   Lauren Vazquez is a 60 y.o. female  Chief Complaint:   Lauren Vazquez presents for an evaluation of Type 2 Diabetes Mellitus , Hyperlipidemia and hypertension. Subjective:   Type 2 Diabetes Mellitus diagnosed around 2010  On insulin since 2012  Previous regimen:Taking Admelog 15 units tidac and 5 units for snacks. Basaglar 40 units once a day at night. VGO stopped due to hypoglycemias     Multiple hospitalizations in 2021 due to weakness, anemia   She is recently released from a nursing home     Not taking:   Synjardy 12.5-1000mg, two tabs in am. Last pick ups: Aug 2020 and Feb 2021. Admits making poor dietary choices, trying to cut down fried. Currently on   Lantus 26 units daily in am. She said she is missing it 4-5 days a week    Victoza 1.8 mg daily in AM  Glimepiride 4 mg with dinner     Novolog SS                      With meals (during the day)   - at bedtime    < 150 ---     5 units                                 0 units  151-200 --  6 units                                 0 units  201-250 :    7 units                                 1 units  251-300:     8 units                                 2 units  301-350:     9 units                                 3 units  >350 :         10 units                               4 units      She thinks she is eating too much bread, pasta     Checks blood sugars 2-3 times per day. Reviewed 188 - > 500   AM:   Lunch:   Supper:    HS:        Hypoglycemias: Once in last 4 weeks      Meals: 3, dinner is big. Snacks (jellos, fruits, pretzels) after every meal because she is hungry. Exercise: None    Denies chest pain, exertional dyspnea. Family history of CAD: Both parents  Denies smoking/ alcohol.         The following portions of the patient's history were reviewed and updated as appropriate:       Family History   Problem Relation Age of Onset    Cancer Mother         breast    Cancer Father     Heart Failure Neg Hx     High Cholesterol Neg Hx     Hypertension Neg Hx     Migraines Neg Hx     Rashes/Skin Problems Neg Hx     Seizures Neg Hx     Stroke Neg Hx     Thyroid Disease Neg Hx     Diabetes Neg Hx          Social History     Socioeconomic History    Marital status:      Spouse name: Not on file    Number of children: 1    Years of education: Not on file    Highest education level: Not on file   Occupational History    Occupation:    Tobacco Use    Smoking status: Former     Packs/day: 0.50     Years: 20.00     Pack years: 10.00     Types: Cigarettes, Cigars     Quit date: 7/3/2014     Years since quittin.3    Smokeless tobacco: Never   Vaping Use    Vaping Use: Never used   Substance and Sexual Activity    Alcohol use: No     Alcohol/week: 0.0 standard drinks    Drug use: No    Sexual activity: Not on file   Other Topics Concern    Not on file   Social History Narrative    Not on file     Social Determinants of Health     Financial Resource Strain: Not on file   Food Insecurity: Not on file   Transportation Needs: Not on file   Physical Activity: Not on file   Stress: Not on file   Social Connections: Not on file   Intimate Partner Violence: Not on file   Housing Stability: Not on file       Past Medical History:   Diagnosis Date    Abnormal brain MRI 2017    Partially empty sella and minimal chronic small vessel ischemic disease    Acute bilateral low back pain without sciatica 2016    SHARRON (acute kidney injury) ([facility] [address] 2017    Arthritis     back    Bipolar disorder ([facility] [address] 10/18/2008    CAD (coronary artery disease)     stent placed 20    Cancer ([facility] [address]     bilateral breast:s/p lumpectomy/radiation:under care care of breast specialist:Dr. Boone     Carotid stenosis, bilateral:<50%:per US 2016 7/15/2016    Carpal tunnel syndrome 10/18/2008    Cervical cancer screening 2014    Nml per pt'.     Coronary artery disease of native artery of native heart with stable angina pectoris ([facility] [address] 2020    DDD (degenerative disc disease), lumbar 7/18/2018    Depression     under care of pschiatrist:Dr. 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 myelopathy or radiculopathy 3/10/2017    Transient cerebral ischemia 07/15/2016    TIA:7/10/16    Unspecified cerebral artery occlusion with cerebral infarction     TIA       Past Surgical History:   Procedure Laterality Date    BREAST LUMPECTOMY  2015    Bilateral:breast cancer    CARDIAC CATHETERIZATION  06/08/2020    Dr. Kisha Woodson), DAYANA to Diag 1    COLONOSCOPY N/A 2/1/2021    COLONOSCOPY DIAGNOSTIC performed by Macel Denver, MD at [address]  2/3/2021    CT BONE MARROW BIOPSY 2/3/2021 Yarelis Shah MD [facility] CT SCAN    HYSTERECTOMY (CERVIX STATUS UNKNOWN)      Benign:no cervical cancer per pt'    KIDNEY REMOVAL      right    OTHER SURGICAL HISTORY Right     orif right ankle    TEMPORAL ARTERY BIOPSY Right 8/9/2021    RIGHT TEMPORAL ARTERY BIOPSY LIGATION performed by Tierney Monroy MD at [address]  1/29/2021    EGD BIOPSY performed by Macel Denver, MD at [address] [facility]   Allergen Reactions    Morphine Anaphylaxis and Hives     feels like throat is closing    Penicillins Hives and Swelling    Codeine Hives and Rash    Penicillin G Rash         Current Outpatient Medications:     Liraglutide (VICTOZA) 18 MG/3ML SOPN SC injection, INJECT 1.8 MG UNDER THE SKIN ONCE DAILY, Disp: 3 Adjustable Dose Pre-filled Pen Syringe, Rfl: 3    insulin lispro, 1 Unit Dial, (HUMALOG/ADMELOG) 100 UNIT/ML SOPN, With meals                   - at bedtime   < 150 ---   5 units            0 units 151-200 -- 6 units            0 units 201-250 :  7 units            1 units 251-300:   8 units            2 units 301-350:   9 units            3 units >350 :       10 units          4 units Upto 30 units/day, Disp: 5 Adjustable Dose Pre-filled Pen Syringe, Rfl: 2    glimepiride (AMARYL) 4 MG tablet, One tab daily with breakfast, Disp: 90 tablet, Rfl: 1    insulin glargine (LANTUS SOLOSTAR) 100 UNIT/ML injection pen, Inject 22 Units into the skin every morning, Disp: 5 Adjustable Dose Pre-filled Pen Syringe, Rfl: 3    pioglitazone (ACTOS) 30 MG tablet, , Disp: , Rfl:     lisinopril (PRINIVIL;ZESTRIL) 40 MG tablet, TAKE 1 TABLET BY MOUTH EVERY DAY, Disp: 30 tablet, Rfl: 5    atorvastatin (LIPITOR) 20 MG tablet, TAKE 1 TABLET BY MOUTH EVERY DAY, Disp: 30 tablet, Rfl: 5    blood glucose test strips (TRUE METRIX BLOOD GLUCOSE TEST) strip, As needed. (Patient taking differently: 3 each daily), Disp: 200 strip, Rfl: 5    Insulin Pen Needle 32G X 4 MM MISC, 1 each by Does not apply route daily, Disp: 100 each, Rfl: 3    Blood Glucose Monitoring Suppl (TRUE METRIX METER) w/Device KIT, Used to  check blood sugar 3 times eyad, Disp: 1 kit, Rfl: 1    ticagrelor (BRILINTA) 90 MG TABS tablet, TAKE 1 TABLET BY MOUTH TWICE A DAY, Disp: 60 tablet, Rfl: 11    nitroGLYCERIN (NITROSTAT) 0.4 MG SL tablet, up to max of 3 total doses. If no relief after 1 dose, call 911., Disp: 25 tablet, Rfl: 0    gabapentin (NEURONTIN) 600 MG tablet, Take 0.5 tablets by mouth 2 times daily for 3 days.  (Patient taking differently: Take 600 mg by mouth 3 times daily. ), Disp: 90 tablet, Rfl: 3    paliperidone (INVEGA) 9 MG extended release tablet, Take 9 mg by mouth every morning, Disp: , Rfl:     pantoprazole (PROTONIX) 40 MG tablet, Take 40 mg by mouth daily , Disp: , Rfl:     ferrous sulfate (IRON 325) 325 (65 Fe) MG tablet, Take 325 mg by mouth daily (with breakfast), Disp: , Rfl:     oxyCODONE-acetaminophen (PERCOCET) 7.5-325 MG per tablet, TAKE 1 TABLET BY MOUTH EVERY 6 (SIX) HOURS AS NEEDED FOR UP TO 28 DAYS., Disp: , Rfl:     clonazePAM (KLONOPIN) 0.5 MG tablet, Take 0.5 mg by mouth 3 times daily as needed. , Disp: , Rfl:     calcium carbonate-vitamin D (CALTRATE) 600-400 MG-UNIT TABS per tab, Take 1 tablet by mouth daily , Disp: , Rfl:     aspirin 81 MG tablet, Take 81 mg by mouth daily, Disp: , Rfl:     traZODone (DESYREL) 100 MG tablet, Take 200 mg by mouth nightly , Disp: , Rfl:       Review of Systems:    Constitutional: Negative for fever, chills, and unexpected weight change. HENT: Negative for congestion, ear pain, rhinorrhea,  sore throat and trouble swallowing. Eyes: Negative for photophobia, redness, itching. Respiratory: Negative for cough, shortness of breath and sputum. Cardiovascular: Negative for chest pain, palpitations and leg swelling. Gastrointestinal: Negative for nausea, vomiting, abdominal pain, diarrhea, constipation. Endocrine: Negative for cold intolerance, heat intolerance, polydipsia, polyphagia and polyuria. Genitourinary: Negative for dysuria, urgency, frequency, hematuria and flank pain. Musculoskeletal: Negative for myalgias, back pain, arthralgias and neck pain. Skin/Nail: Negative for rash, itching. Normal nails. Neurological: Negative for seizures, weakness, light-headedness, numbness and headaches. Hematological/ Lymph nodes: Negative for adenopathy. Does not bruise/bleed easily. Psychiatric/Behavioral: Negative for suicidal ideas, depression, anxiety, sleep disturbance and decreased concentration.           Objective:   Physical Exam:  There were no vitals taken for this visit. Constitutional: Patient is oriented to person, place, and time. Patient appears well-developed and well-nourished. HENT:               Head: Normocephalic and atraumatic. Eyes: Conjunctivae and EOM are normal.                Neck: Normal range of motion. Neurological: Patient is alert and oriented to person, place, and time. Skin: Skin is warm and dry. Psychiatric: Patient has a normal mood and affect.  Patient behavior is normal.     Lab Review:    Orders Only on 09/27/2022   Component Date Value Ref Range Status    Microalbumin, Random Urine 09/27/2022 71.70 (A)  <2.0 mg/dL Final    Creatinine, Ur 09/27/2022 76.5  28.0 - 259.0 mg/dL Final    Microalbumin Creatinine Ratio 09/27/2022 937.3 (A)  0.0 - 30.0 mg/g Final    Sodium 09/27/2022 140  136 - 145 mmol/L Final    Potassium 09/27/2022 3.9  3.5 - 5.1 mmol/L Final    Chloride 09/27/2022 102  99 - 110 mmol/L Final    CO2 09/27/2022 25  21 - 32 mmol/L Final    Anion Gap 09/27/2022 13  3 - 16 Final    Glucose 09/27/2022 333 (A)  70 - 99 mg/dL Final    BUN 09/27/2022 22 (A)  7 - 20 mg/dL Final    Creatinine 09/27/2022 1.1  0.6 - 1.2 mg/dL Final    GFR Non- 09/27/2022 50 (A)  >60 Final    GFR  09/27/2022 >60  >60 Final    Calcium 09/27/2022 9.9  8.3 - 10.6 mg/dL Final    Total Protein 09/27/2022 7.6  6.4 - 8.2 g/dL Final    Albumin 09/27/2022 4.2  3.4 - 5.0 g/dL Final    Albumin/Globulin Ratio 09/27/2022 1.2  1.1 - 2.2 Final    Total Bilirubin 09/27/2022 0.3  0.0 - 1.0 mg/dL Final    Alkaline Phosphatase 09/27/2022 151 (A)  40 - 129 U/L Final    ALT 09/27/2022 62 (A)  10 - 40 U/L Final    AST 09/27/2022 49 (A)  15 - 37 U/L Final    Cholesterol, Total 09/27/2022 206 (A)  0 - 199 mg/dL Final    Triglycerides 09/27/2022 249 (A)  0 - 150 mg/dL Final    HDL 09/27/2022 64 (A)  40 - 60 mg/dL Final    LDL Calculated 09/27/2022 92  <100 mg/dL Final    VLDL Cholesterol Calculated 09/27/2022 50  Not Established mg/dL Final    Hemoglobin A1C 09/27/2022 14.9  See comment % Final    eAG 09/27/2022 380.9  mg/dL Final   Admission on 06/30/2022, Discharged on 06/30/2022   Component Date Value Ref Range Status    Ventricular Rate 06/30/2022 91  BPM Final    Atrial Rate 06/30/2022 91  BPM Final    P-R Interval 06/30/2022 140  ms Final    QRS Duration 06/30/2022 70  ms Final    Q-T Interval 06/30/2022 354  ms Final    QTc Calculation (Bazett) 06/30/2022 435  ms Final    P Axis 06/30/2022 71  degrees Final    R Axis 06/30/2022 -28  degrees Final    T Axis 06/30/2022 73  degrees Final    Diagnosis 06/30/2022 Normal sinus rhythmNormal ECGWhen compared with ECG of 31-MAR-2022 13:15,Vent.  rate has increased BY  37 BPMConfirmed by Hair Morgan MD, Claudio Gonzalez ([id number]) on 7/1/2022 4:48:19 PM   Final    WBC 06/30/2022 3.9 (A)  4.0 - 11.0 K/uL Final    RBC 06/30/2022 3.23 (A)  4.00 - 5.20 M/uL Final    Hemoglobin 06/30/2022 9.2 (A)  12.0 - 16.0 g/dL Final    Hematocrit 06/30/2022 29.0 (A)  36.0 - 48.0 % Final    MCV 06/30/2022 89.8  80.0 - 100.0 fL Final    MCH 06/30/2022 28.5  26.0 - 34.0 pg Final    MCHC 06/30/2022 31.7  31.0 - 36.0 g/dL Final    RDW 06/30/2022 12.7  12.4 - 15.4 % Final    Platelets 42/37/0616 138  135 - 450 K/uL Final    MPV 06/30/2022 8.8  5.0 - 10.5 fL Final    Neutrophils % 06/30/2022 52.4  % Final    Lymphocytes % 06/30/2022 39.7  % Final    Monocytes % 06/30/2022 6.4  % Final    Eosinophils % 06/30/2022 1.3  % Final    Basophils % 06/30/2022 0.2  % Final    Neutrophils Absolute 06/30/2022 2.0  1.7 - 7.7 K/uL Final    Lymphocytes Absolute 06/30/2022 1.5  1.0 - 5.1 K/uL Final    Monocytes Absolute 06/30/2022 0.2  0.0 - 1.3 K/uL Final    Eosinophils Absolute 06/30/2022 0.1  0.0 - 0.6 K/uL Final    Basophils Absolute 06/30/2022 0.0  0.0 - 0.2 K/uL Final    Sodium 06/30/2022 135 (A)  136 - 145 mmol/L Final    Potassium 06/30/2022 4.6  3.5 - 5.1 mmol/L Final    Chloride 06/30/2022 98 (A)  99 - 110 mmol/L Final    CO2 06/30/2022 30  21 - 32 mmol/L Final    Anion Gap 06/30/2022 7  3 - 16 Final    Glucose 06/30/2022 491 (A)  70 - 99 mg/dL Final    BUN 06/30/2022 22 (A)  7 - 20 mg/dL Final    Creatinine 06/30/2022 1.3 (A)  0.6 - 1.2 mg/dL Final    GFR Non- 06/30/2022 41 (A)  >60 Final    GFR  06/30/2022 50 (A)  >60 Final    Calcium 06/30/2022 9.9  8.3 - 10.6 mg/dL Final    Total Protein 06/30/2022 7.2  6.4 - 8.2 g/dL Final    Albumin 06/30/2022 3.7  3.4 - 5.0 g/dL Final    Albumin/Globulin Ratio 06/30/2022 1.1  1.1 - 2.2 Final    Total Bilirubin 06/30/2022 <0.2  0.0 - 1.0 mg/dL Final    Alkaline Phosphatase 06/30/2022 159 (A)  40 - 129 U/L Final    ALT 06/30/2022 40  10 - 40 U/L Final    AST 06/30/2022 23  15 - 37 U/L Final    Color, UA 06/30/2022 Yellow  Straw/Yellow Final    Clarity, UA 06/30/2022 Clear  Clear Final    Glucose, Ur 06/30/2022 >=1000 (A)  Negative mg/dL Final    Bilirubin Urine 06/30/2022 Negative  Negative Final    Ketones, Urine 06/30/2022 Negative  Negative mg/dL Final    Specific Gravity, UA 06/30/2022 1.015  1.005 - 1.030 Final    Blood, Urine 06/30/2022 TRACE-LYSED (A)  Negative Final    pH, UA 06/30/2022 6.0  5.0 - 8.0 Final    Protein, UA 06/30/2022 30 (A)  Negative mg/dL Final    Urobilinogen, Urine 06/30/2022 0.2  <2.0 E.U./dL Final    Nitrite, Urine 06/30/2022 Negative  Negative Final    Leukocyte Esterase, Urine 06/30/2022 Negative  Negative Final    Microscopic Examination 06/30/2022 YES   Final    Urine Type 06/30/2022 NotGiven   Final    Troponin 06/30/2022 <0.01  <0.01 ng/mL Final    WBC, UA 06/30/2022 3-5  0 - 5 /HPF Final    RBC, UA 06/30/2022 0-2  0 - 4 /HPF Final    Epithelial Cells, UA 06/30/2022 0-1  0 - 5 /HPF Final   Admission on 03/31/2022, Discharged on 03/31/2022   Component Date Value Ref Range Status    Color, UA 03/31/2022 Yellow  Straw/Yellow Final    Clarity, UA 03/31/2022 Clear  Clear Final    Glucose, Ur 03/31/2022 >=1000 (A) Negative mg/dL Final    Bilirubin Urine 03/31/2022 Negative  Negative Final    Ketones, Urine 03/31/2022 Negative  Negative mg/dL Final    Specific Gravity, UA 03/31/2022 <=1.005  1.005 - 1.030 Final    Blood, Urine 03/31/2022 Negative  Negative Final    pH, UA 03/31/2022 6.0  5.0 - 8.0 Final    Protein, UA 03/31/2022 Negative  Negative mg/dL Final    Urobilinogen, Urine 03/31/2022 0.2  <2.0 E.U./dL Final    Nitrite, Urine 03/31/2022 Negative  Negative Final    Leukocyte Esterase, Urine 03/31/2022 Negative  Negative Final    Microscopic Examination 03/31/2022 Not Indicated   Final    Urine Type 03/31/2022 NotGiven   Final    WBC, UA 03/31/2022 None seen  0 - 5 /HPF Final    RBC, UA 03/31/2022 None seen  0 - 4 /HPF Final    Renal Epithelial, UA 03/31/2022 0-1  0 - 1 /HPF Final    POC Glucose 03/31/2022 >600 (A)  70 - 99 mg/dl Final    Performed on 03/31/2022 ACCU-CHEK   Final    POC Glucose 03/31/2022 >600 (A)  70 - 99 mg/dl Final    Performed on 03/31/2022 ACCU-CHEK   Final    WBC 03/31/2022 5.4  4.0 - 11.0 K/uL Final    RBC 03/31/2022 3.61 (A)  4.00 - 5.20 M/uL Final    Hemoglobin 03/31/2022 10.1 (A)  12.0 - 16.0 g/dL Final    Hematocrit 03/31/2022 31.5 (A)  36.0 - 48.0 % Final    MCV 03/31/2022 87.3  80.0 - 100.0 fL Final    MCH 03/31/2022 28.0  26.0 - 34.0 pg Final    MCHC 03/31/2022 32.1  31.0 - 36.0 g/dL Final    RDW 03/31/2022 12.9  12.4 - 15.4 % Final    Platelets 00/12/8469 176  135 - 450 K/uL Final    MPV 03/31/2022 10.1  5.0 - 10.5 fL Final    Neutrophils % 03/31/2022 65.2  % Final    Lymphocytes % 03/31/2022 28.0  % Final    Monocytes % 03/31/2022 6.1  % Final    Eosinophils % 03/31/2022 0.4  % Final    Basophils % 03/31/2022 0.3  % Final    Neutrophils Absolute 03/31/2022 3.5  1.7 - 7.7 K/uL Final    Lymphocytes Absolute 03/31/2022 1.5  1.0 - 5.1 K/uL Final    Monocytes Absolute 03/31/2022 0.3  0.0 - 1.3 K/uL Final    Eosinophils Absolute 03/31/2022 0.0  0.0 - 0.6 K/uL Final    Basophils Absolute 03/31/2022 0.0  0.0 - 0.2 K/uL Final    Sodium 03/31/2022 129 (A)  136 - 145 mmol/L Final    Potassium reflex Magnesium 03/31/2022 5.0  3.5 - 5.1 mmol/L Final    Chloride 03/31/2022 92 (A)  99 - 110 mmol/L Final    CO2 03/31/2022 28  21 - 32 mmol/L Final    Anion Gap 03/31/2022 9  3 - 16 Final    Glucose 03/31/2022 668 (A)  70 - 99 mg/dL Final    BUN 03/31/2022 21 (A)  7 - 20 mg/dL Final    Creatinine 03/31/2022 1.6 (A)  0.6 - 1.2 mg/dL Final    GFR Non- 03/31/2022 33 (A)  >60 Final    GFR  03/31/2022 39 (A)  >60 Final    Calcium 03/31/2022 9.5  8.3 - 10.6 mg/dL Final    Total Protein 03/31/2022 7.6  6.4 - 8.2 g/dL Final    Albumin 03/31/2022 3.9  3.4 - 5.0 g/dL Final    Albumin/Globulin Ratio 03/31/2022 1.1  1.1 - 2.2 Final    Total Bilirubin 03/31/2022 0.4  0.0 - 1.0 mg/dL Final    Alkaline Phosphatase 03/31/2022 534 (A)  40 - 129 U/L Final    ALT 03/31/2022 92 (A)  10 - 40 U/L Final    AST 03/31/2022 36  15 - 37 U/L Final    Beta-Hydroxybutyrate 03/31/2022 0.44 (A)  0.00 - 0.27 mmol/L Final    Magnesium 03/31/2022 2.10  1.80 - 2.40 mg/dL Final    Phosphorus 03/31/2022 3.8  2.5 - 4.9 mg/dL Final    pH, Kyle 03/31/2022 7.355  7.350 - 7.450 Final    pCO2, Kyle 03/31/2022 53.5 (A)  40.0 - 50.0 mmHg Final    pO2, Kyle 03/31/2022 35.9  25.0 - 40.0 mmHg Final    HCO3, Venous 03/31/2022 29.2 (A)  23.0 - 29.0 mmol/L Final    Base Excess, Kyle 03/31/2022 2.8  -3.0 - 3.0 mmol/L Final    O2 Sat, Kyle 03/31/2022 68  Not Established % Final    Carboxyhemoglobin 03/31/2022 1.5  0.0 - 1.5 % Final    MetHgb, Kyle 03/31/2022 0.5  <1.5 % Final    TC02 (Calc), Kyle 03/31/2022 31  Not Established mmol/L Final    O2 Therapy 03/31/2022 Unknown   Final    Ventricular Rate 03/31/2022 54  BPM Final    Atrial Rate 03/31/2022 54  BPM Final    P-R Interval 03/31/2022 202  ms Final    QRS Duration 03/31/2022 76  ms Final    Q-T Interval 03/31/2022 444  ms Final    QTc Calculation (Bazett) 03/31/2022 421  ms Final    P 02/14/2022 417 (A)  70 - 99 mg/dL Final    BUN 02/14/2022 19  7 - 20 mg/dL Final    Creatinine 02/14/2022 1.1  0.6 - 1.2 mg/dL Final    GFR Non- 02/14/2022 50 (A)  >60 Final    GFR  02/14/2022 >60  >60 Final    Calcium 02/14/2022 9.5  8.3 - 10.6 mg/dL Final    Total Protein 02/14/2022 7.0  6.4 - 8.2 g/dL Final    Albumin 02/14/2022 4.0  3.4 - 5.0 g/dL Final    Albumin/Globulin Ratio 02/14/2022 1.3  1.1 - 2.2 Final    Total Bilirubin 02/14/2022 <0.2  0.0 - 1.0 mg/dL Final    Alkaline Phosphatase 02/14/2022 215 (A)  40 - 129 U/L Final    ALT 02/14/2022 66 (A)  10 - 40 U/L Final    AST 02/14/2022 31  15 - 37 U/L Final    Sed Rate 02/14/2022 49 (A)  0 - 30 mm/Hr Final    CRP 02/14/2022 <3.0  0.0 - 5.1 mg/L Final    Troponin 02/14/2022 <0.01  <0.01 ng/mL Final    Ventricular Rate 02/14/2022 58  BPM Final    Atrial Rate 02/14/2022 58  BPM Final    P-R Interval 02/14/2022 158  ms Final    QRS Duration 02/14/2022 80  ms Final    Q-T Interval 02/14/2022 448  ms Final    QTc Calculation (Bazett) 02/14/2022 439  ms Final    P Axis 02/14/2022 80  degrees Final    R Axis 02/14/2022 -23  degrees Final    T Axis 02/14/2022 58  degrees Final    Diagnosis 02/14/2022 Sinus bradycardiaOtherwise normal ECGWhen compared with ECG of 02-NOV-2021 12:42,No significant change was foundConfirmed by Valerie Garcia MD, Denisa Pagan ([id number]) on 2/14/2022 6:56:54 PM   Final    pH, Kyle 02/14/2022 7.435  7.350 - 7.450 Final    pCO2, Kyle 02/14/2022 38.9 (A)  40.0 - 50.0 mmHg Final    pO2, Kyle 02/14/2022 55.2 (A)  25.0 - 40.0 mmHg Final    HCO3, Venous 02/14/2022 25.5  23.0 - 29.0 mmol/L Final    Base Excess, Kyle 02/14/2022 1.3  -3.0 - 3.0 mmol/L Final    O2 Sat, Kyle 02/14/2022 90  Not Established % Final    Carboxyhemoglobin 02/14/2022 4.5 (A)  0.0 - 1.5 % Final    MetHgb, Kyle 02/14/2022 0.3  <1.5 % Final    TC02 (Calc), Kyle 02/14/2022 27  Not Established mmol/L Final    O2 Therapy 02/14/2022 Unknown   Final    Specimen Status 02/14/2022 KIMMY   Final    Color, UA 02/14/2022 Yellow  Straw/Yellow Final    Clarity, UA 02/14/2022 Clear  Clear Final    Glucose, Ur 02/14/2022 >=1000 (A)  Negative mg/dL Final    Bilirubin Urine 02/14/2022 Negative  Negative Final    Ketones, Urine 02/14/2022 Negative  Negative mg/dL Final    Specific Gravity, UA 02/14/2022 1.015  1.005 - 1.030 Final    Blood, Urine 02/14/2022 TRACE-INTACT (A)  Negative Final    pH, UA 02/14/2022 6.5  5.0 - 8.0 Final    Protein, UA 02/14/2022 30 (A)  Negative mg/dL Final    Urobilinogen, Urine 02/14/2022 0.2  <2.0 E.U./dL Final    Nitrite, Urine 02/14/2022 Negative  Negative Final    Leukocyte Esterase, Urine 02/14/2022 Negative  Negative Final    Microscopic Examination 02/14/2022 YES   Final    Urine Type 02/14/2022 NotGiven   Final    WBC, UA 02/14/2022 0-2  0 - 5 /HPF Final    RBC, UA 02/14/2022 3-4  0 - 4 /HPF Final    Epithelial Cells, UA 02/14/2022 11-20 (A)  0 - 5 /HPF Final    Bacteria, UA 02/14/2022 Rare (A)  None Seen /HPF Final    Amphetamine Screen, Urine 02/14/2022 Neg  Negative <1000ng/mL Final    Barbiturate Screen, Ur 02/14/2022 Neg  Negative <200 ng/mL Final    Benzodiazepine Screen, Urine 02/14/2022 Neg  Negative <200 ng/mL Final    Cannabinoid Scrn, Ur 02/14/2022 Neg  Negative <50 ng/mL Final    Cocaine Metabolite Screen, Urine 02/14/2022 Neg  Negative <300 ng/mL Final    Opiate Scrn, Ur 02/14/2022 Neg  Negative <300 ng/mL Final    PCP Screen, Urine 02/14/2022 Neg  Negative <25 ng/mL Final    Methadone Screen, Urine 02/14/2022 Neg  Negative <300 ng/mL Final    Propoxyphene Scrn, Ur 02/14/2022 Neg  Negative <300 ng/mL Final    Oxycodone Urine 02/14/2022 POSITIVE (A)  Negative <100 ng/ml Final    pH, UA 02/14/2022 6.5   Final    Drug Screen Comment: 02/14/2022 see below   Final    POC Glucose 02/14/2022 268 (A)  70 - 99 mg/dl Final    Performed on 02/14/2022 ACCU-CHEK   Final    Hemoglobin A1C 02/15/2022 11.5  See comment % Final    eAG 02/15/2022 283.4  mg/dL Final    POC Glucose 02/15/2022 45 (A)  70 - 99 mg/dl Final    Performed on 02/15/2022 ACCU-CHEK   Final    POC Glucose 02/15/2022 60 (A)  70 - 99 mg/dl Final    Performed on 02/15/2022 ACCU-CHEK   Final    POC Glucose 02/15/2022 95  70 - 99 mg/dl Final    Performed on 02/15/2022 ACCU-CHEK   Final    POC Glucose 02/15/2022 340 (A)  70 - 99 mg/dl Final    Performed on 02/15/2022 ACCU-CHEK   Final    POC Glucose 02/15/2022 429 (A)  70 - 99 mg/dl Final    Performed on 02/15/2022 ACCU-CHEK   Final    POC Glucose 02/15/2022 41 (A)  70 - 99 mg/dl Final    Performed on 02/15/2022 ACCU-CHEK   Final    POC Glucose 02/15/2022 301 (A)  70 - 99 mg/dl Final    Performed on 02/15/2022 ACCU-CHEK   Final    POC Glucose 02/15/2022 281 (A)  70 - 99 mg/dl Final    Performed on 02/15/2022 ACCU-CHEK   Final    POC Glucose 02/15/2022 122 (A)  70 - 99 mg/dl Final    Performed on 02/15/2022 ACCU-CHEK   Final    WBC 02/16/2022 3.7 (A)  4.0 - 11.0 K/uL Final    RBC 02/16/2022 3.99 (A)  4.00 - 5.20 M/uL Final    Hemoglobin 02/16/2022 11.4 (A)  12.0 - 16.0 g/dL Final    Hematocrit 02/16/2022 35.2 (A)  36.0 - 48.0 % Final    MCV 02/16/2022 88.2  80.0 - 100.0 fL Final    MCH 02/16/2022 28.6  26.0 - 34.0 pg Final    MCHC 02/16/2022 32.4  31.0 - 36.0 g/dL Final    RDW 02/16/2022 12.8  12.4 - 15.4 % Final    Platelets 85/67/0116 116 (A)  135 - 450 K/uL Final    MPV 02/16/2022 9.8  5.0 - 10.5 fL Final    Neutrophils % 02/16/2022 49.7  % Final    Lymphocytes % 02/16/2022 41.4  % Final    Monocytes % 02/16/2022 7.6  % Final    Eosinophils % 02/16/2022 1.1  % Final    Basophils % 02/16/2022 0.2  % Final    Neutrophils Absolute 02/16/2022 1.8  1.7 - 7.7 K/uL Final    Lymphocytes Absolute 02/16/2022 1.5  1.0 - 5.1 K/uL Final    Monocytes Absolute 02/16/2022 0.3  0.0 - 1.3 K/uL Final    Eosinophils Absolute 02/16/2022 0.0  0.0 - 0.6 K/uL Final    Basophils Absolute 02/16/2022 0.0  0.0 - 0.2 K/uL Final    Sodium 02/16/2022 133 (A)  136 - 145 mmol/L Final Potassium 02/16/2022 5.0  3.5 - 5.1 mmol/L Final    Chloride 02/16/2022 99  99 - 110 mmol/L Final    CO2 02/16/2022 23  21 - 32 mmol/L Final    Anion Gap 02/16/2022 11  3 - 16 Final    Glucose 02/16/2022 274 (A)  70 - 99 mg/dL Final    BUN 02/16/2022 15  7 - 20 mg/dL Final    Creatinine 02/16/2022 1.3 (A)  0.6 - 1.2 mg/dL Final    GFR Non- 02/16/2022 41 (A)  >60 Final    GFR  02/16/2022 50 (A)  >60 Final    Calcium 02/16/2022 8.6  8.3 - 10.6 mg/dL Final    Magnesium 02/16/2022 2.00  1.80 - 2.40 mg/dL Final    POC Glucose 02/15/2022 230 (A)  70 - 99 mg/dl Final    Performed on 02/15/2022 ACCU-CHEK   Final    POC Glucose 02/16/2022 291 (A)  70 - 99 mg/dl Final    Performed on 02/16/2022 ACCU-CHEK   Final    POC Glucose 02/16/2022 262 (A)  70 - 99 mg/dl Final    Performed on 02/16/2022 ACCU-CHEK   Final    POC Glucose 02/16/2022 247 (A)  70 - 99 mg/dl Final    Performed on 02/16/2022 ACCU-CHEK   Final    POC Glucose 02/16/2022 185 (A)  70 - 99 mg/dl Final    Performed on 02/16/2022 ACCU-CHEK   Final    Rejected Test 02/16/2022 TSH FRT4   Final    Reason for Rejection 02/16/2022 see below   Final    TSH 02/16/2022 0.75  0.27 - 4.20 uIU/mL Final    T4 Free 02/16/2022 1.4  0.9 - 1.8 ng/dL Final   Admission on 11/22/2021, Discharged on 11/23/2021   Component Date Value Ref Range Status    POC Glucose 11/22/2021 384 (A)  70 - 99 mg/dl Final    Performed on 11/22/2021 ACCU-CHEK   Final    WBC 11/22/2021 4.8  4.0 - 11.0 K/uL Final    RBC 11/22/2021 3.68 (A)  4.00 - 5.20 M/uL Final    Hemoglobin 11/22/2021 10.7 (A)  12.0 - 16.0 g/dL Final    Hematocrit 11/22/2021 33.2 (A)  36.0 - 48.0 % Final    MCV 11/22/2021 90.4  80.0 - 100.0 fL Final    MCH 11/22/2021 29.1  26.0 - 34.0 pg Final    MCHC 11/22/2021 32.2  31.0 - 36.0 g/dL Final    RDW 11/22/2021 12.0 (A)  12.4 - 15.4 % Final    Platelets 91/24/9370 149  135 - 450 K/uL Final    MPV 11/22/2021 8.8  5.0 - 10.5 fL Final    Neutrophils % 11/22/2021 69.8  % Final    Lymphocytes % 11/22/2021 20.0  % Final    Monocytes % 11/22/2021 9.6  % Final    Eosinophils % 11/22/2021 0.4  % Final    Basophils % 11/22/2021 0.2  % Final    Neutrophils Absolute 11/22/2021 3.3  1.7 - 7.7 K/uL Final    Lymphocytes Absolute 11/22/2021 1.0  1.0 - 5.1 K/uL Final    Monocytes Absolute 11/22/2021 0.5  0.0 - 1.3 K/uL Final    Eosinophils Absolute 11/22/2021 0.0  0.0 - 0.6 K/uL Final    Basophils Absolute 11/22/2021 0.0  0.0 - 0.2 K/uL Final    Sodium 11/22/2021 134 (A)  136 - 145 mmol/L Final    Potassium reflex Magnesium 11/22/2021 3.8  3.5 - 5.1 mmol/L Final    Chloride 11/22/2021 98 (A)  99 - 110 mmol/L Final    CO2 11/22/2021 26  21 - 32 mmol/L Final    Anion Gap 11/22/2021 10  3 - 16 Final    Glucose 11/22/2021 306 (A)  70 - 99 mg/dL Final    BUN 11/22/2021 14  7 - 20 mg/dL Final    Creatinine 11/22/2021 1.1  0.6 - 1.2 mg/dL Final    GFR Non- 11/22/2021 50 (A)  >60 Final    GFR  11/22/2021 >60  >60 Final    Calcium 11/22/2021 9.3  8.3 - 10.6 mg/dL Final    Total Protein 11/22/2021 7.5  6.4 - 8.2 g/dL Final    Albumin 11/22/2021 3.6  3.4 - 5.0 g/dL Final    Albumin/Globulin Ratio 11/22/2021 0.9 (A)  1.1 - 2.2 Final    Total Bilirubin 11/22/2021 <0.2  0.0 - 1.0 mg/dL Final    Alkaline Phosphatase 11/22/2021 170 (A)  40 - 129 U/L Final    ALT 11/22/2021 57 (A)  10 - 40 U/L Final    AST 11/22/2021 24  15 - 37 U/L Final    Troponin 11/22/2021 <0.01  <0.01 ng/mL Final    Pro-BNP 11/22/2021 248 (A)  0 - 124 pg/mL Final    Rapid Influenza A Ag 11/22/2021 Negative  Negative Final    Rapid Influenza B Ag 11/22/2021 Negative  Negative Final    SARS-CoV-2, NAAT 11/22/2021 DETECTED (A)  Not Detected Final    POC Glucose 11/22/2021 223 (A)  70 - 99 mg/dl Final    Performed on 11/22/2021 ACCU-CHEK   Final           Assessment and Plan     There are no diagnoses linked to this encounter.         1: Type 2 DM complicated with nephropathy, neuropathy TIAs, carotid stenosis   Uncontrolled A1C 14.9% < 14.4%   < 15.5%   < 15.3% <  9.3% (after transfusion) < 11.9% < 7.3% < 8.4%<  6.8% < 10.1%  << 8.1 <  8% < 11.7%    Cr 1.1, GFR 60 - Sep 2022   A1C of <8 would be acceptable because of microvascular and macrovascular complications. Questionable adherence to medical plan, treatment adherence and diet plan   Jian not approved      Need to bring meds on every visit    Bring meter every visit   Adherence to medications is poor     Blood sugars are very high due to poor dherence to lantus and med s  Adherence addressed again today     Change Glimepiride 4 mg with breakfast or first meal of the day     Change Lantus 22 units daily in am.  Victoza 1.8 mg daily in AM    C/w Novolog SS                     With meals (during the day)   - at bedtime    < 150 ---     5 units                                 0 units  151-200 --  6 units                                 0 units  201-250 :    7 units                                 1 units  251-300:     8 units                                 2 units  301-350:     9 units                                 3 units  >350 :         10 units                               4 units    Reiterated importance of taking care meds to control diabetes     All instructions provided in written. Check Blood sugars 4 times per day. Log them along with insulin and send them every 2 weeks. Call for blood sugars less than 60 or more than 400. Eye exam: Last exam in Oct 2022, reports early cataract but no DR. Foot exam:  March 2020    Deformity/amputation: absent  Skin lesions/pre-ulcerative calluses: absent  Edema: right- negative, left- negative  Sensory exam: Monofilament sensation: normal  Plus at least one of the following:   Pulses: normal,   Vibration (128 Hz):  Moderately decreased     Renal screen: High 68 Feb 2018, high 47 Jan 2019  > 40 June 2020 > 937 Sep 2022     TSH screen: Feb 2022   Saw CDE in 2016 with Rd.     2: HTN   Slightly 3: Hyperlipidemia   LDL: 92, HDL: 64 , TGs: 249 - Sep 2022      Atorvastatin 10 mg daily     4: Unintentional weigh tloss - good appetite   TSH, FT4, am cortisol normal - Nov 2020    TSH, Free T4 normal Feb 2022   Following up with pcp     Labs today:   None     RTC in 6 weeks     EDUCATION:   Greater than 50% of this follow-up visit was spent in general counseling regarding   obesity, diet, exercise, importance of adherence to insulin regime, recognition and treatment of hypo and hyperglycemia,  glucose logging, proper diabetes management, diabetic complications with poor management and the importance of glycemic control in order to avoid the complications of diabetes. Risks and potential complications of diabetes were reviewed with the patient. Diabetes health maintenance plan and follow-up were discussed and understood by the patient. We reviewed the importance of medication compliance and regular follow-up. Aggressive lifestyle modification was encouraged. Exercise Counselling: This patient is a candidate for regular physical exercise. Instructions to perform the following types of exercise:  Swimming or water aerobic exercise  Brisk walking  Playing tennis  Stationary bicycle or elliptical indoor  Low impact aerobic exercise    Instructions given to exercise for the following duration:  30 minutes a day for five-seven days per week.     Following instructions for being active throughout the day in addition to formal exercise:  Walk instead of drive whenever possible  Take the stairs instead of the elevator  Work in the garden  Park to the far end of the parking lot to add more walking steps to destination      Electronically signed by Steven Gannon MD on 11/11/2022 at 10:31 AM

## 2022-12-10 ENCOUNTER — APPOINTMENT (OUTPATIENT)
Dept: CT IMAGING | Age: 62
DRG: 092 | End: 2022-12-10
Payer: COMMERCIAL

## 2022-12-10 ENCOUNTER — HOSPITAL ENCOUNTER (INPATIENT)
Age: 62
LOS: 3 days | Discharge: NURSING, REHAB OR LONG-TERM CARE | DRG: 092 | End: 2022-12-16
Attending: EMERGENCY MEDICINE | Admitting: INTERNAL MEDICINE
Payer: COMMERCIAL

## 2022-12-10 ENCOUNTER — APPOINTMENT (OUTPATIENT)
Dept: GENERAL RADIOLOGY | Age: 62
DRG: 092 | End: 2022-12-10
Payer: COMMERCIAL

## 2022-12-10 DIAGNOSIS — D64.9 ANEMIA, UNSPECIFIED TYPE: ICD-10-CM

## 2022-12-10 DIAGNOSIS — M79.662 BILATERAL CALF PAIN: ICD-10-CM

## 2022-12-10 DIAGNOSIS — R93.89 ABNORMAL CT OF THE CHEST: Primary | ICD-10-CM

## 2022-12-10 DIAGNOSIS — M79.661 BILATERAL CALF PAIN: ICD-10-CM

## 2022-12-10 PROBLEM — R06.00 DYSPNEA: Status: ACTIVE | Noted: 2022-12-10

## 2022-12-10 LAB
A/G RATIO: 1.4 (ref 1.1–2.2)
ALBUMIN SERPL-MCNC: 3.9 G/DL (ref 3.4–5)
ALP BLD-CCNC: 191 U/L (ref 40–129)
ALT SERPL-CCNC: 77 U/L (ref 10–40)
ANION GAP SERPL CALCULATED.3IONS-SCNC: 9 MMOL/L (ref 3–16)
AST SERPL-CCNC: 45 U/L (ref 15–37)
BACTERIA: ABNORMAL /HPF
BASE EXCESS VENOUS: 1 MMOL/L (ref -3–3)
BASOPHILS ABSOLUTE: 0 K/UL (ref 0–0.2)
BASOPHILS RELATIVE PERCENT: 0.5 %
BILIRUB SERPL-MCNC: <0.2 MG/DL (ref 0–1)
BILIRUBIN URINE: NEGATIVE
BLOOD, URINE: ABNORMAL
BUN BLDV-MCNC: 13 MG/DL (ref 7–20)
CALCIUM SERPL-MCNC: 9.3 MG/DL (ref 8.3–10.6)
CARBOXYHEMOGLOBIN: 2.1 % (ref 0–1.5)
CHLORIDE BLD-SCNC: 104 MMOL/L (ref 99–110)
CLARITY: CLEAR
CO2: 27 MMOL/L (ref 21–32)
COLOR: YELLOW
CREAT SERPL-MCNC: 0.9 MG/DL (ref 0.6–1.2)
D DIMER: 1.36 UG/ML FEU (ref 0–0.6)
EKG ATRIAL RATE: 51 BPM
EKG DIAGNOSIS: NORMAL
EKG P AXIS: 54 DEGREES
EKG P-R INTERVAL: 168 MS
EKG Q-T INTERVAL: 446 MS
EKG QRS DURATION: 80 MS
EKG QTC CALCULATION (BAZETT): 411 MS
EKG R AXIS: 8 DEGREES
EKG T AXIS: 53 DEGREES
EKG VENTRICULAR RATE: 51 BPM
EOSINOPHILS ABSOLUTE: 0.1 K/UL (ref 0–0.6)
EOSINOPHILS RELATIVE PERCENT: 1.1 %
EPITHELIAL CELLS, UA: ABNORMAL /HPF (ref 0–5)
GFR SERPL CREATININE-BSD FRML MDRD: >60 ML/MIN/{1.73_M2}
GLUCOSE BLD-MCNC: 213 MG/DL (ref 70–99)
GLUCOSE BLD-MCNC: 254 MG/DL (ref 70–99)
GLUCOSE BLD-MCNC: 69 MG/DL (ref 70–99)
GLUCOSE URINE: >=1000 MG/DL
HCO3 VENOUS: 27.4 MMOL/L (ref 23–29)
HCT VFR BLD CALC: 32.5 % (ref 36–48)
HEMOGLOBIN: 10.7 G/DL (ref 12–16)
HYALINE CASTS: ABNORMAL /LPF (ref 0–2)
INFLUENZA A: NOT DETECTED
INFLUENZA B: NOT DETECTED
KETONES, URINE: NEGATIVE MG/DL
LACTIC ACID, SEPSIS: 1.7 MMOL/L (ref 0.4–1.9)
LEUKOCYTE ESTERASE, URINE: NEGATIVE
LYMPHOCYTES ABSOLUTE: 1.6 K/UL (ref 1–5.1)
LYMPHOCYTES RELATIVE PERCENT: 34.3 %
MCH RBC QN AUTO: 29.2 PG (ref 26–34)
MCHC RBC AUTO-ENTMCNC: 32.9 G/DL (ref 31–36)
MCV RBC AUTO: 88.9 FL (ref 80–100)
METHEMOGLOBIN VENOUS: 0.2 %
MICROSCOPIC EXAMINATION: YES
MONOCYTES ABSOLUTE: 0.3 K/UL (ref 0–1.3)
MONOCYTES RELATIVE PERCENT: 5.4 %
NEUTROPHILS ABSOLUTE: 2.8 K/UL (ref 1.7–7.7)
NEUTROPHILS RELATIVE PERCENT: 58.7 %
NITRITE, URINE: NEGATIVE
O2 SAT, VEN: 70 %
O2 THERAPY: ABNORMAL
PCO2, VEN: 51.2 MMHG (ref 40–50)
PDW BLD-RTO: 11.7 % (ref 12.4–15.4)
PERFORMED ON: ABNORMAL
PERFORMED ON: ABNORMAL
PH UA: 5.5 (ref 5–8)
PH VENOUS: 7.35 (ref 7.35–7.45)
PLATELET # BLD: 156 K/UL (ref 135–450)
PMV BLD AUTO: 8.7 FL (ref 5–10.5)
PO2, VEN: 35.1 MMHG (ref 25–40)
POTASSIUM SERPL-SCNC: 3.9 MMOL/L (ref 3.5–5.1)
PRO-BNP: 166 PG/ML (ref 0–124)
PROTEIN UA: 100 MG/DL
RBC # BLD: 3.66 M/UL (ref 4–5.2)
RBC UA: ABNORMAL /HPF (ref 0–4)
SARS-COV-2 RNA, RT PCR: NOT DETECTED
SODIUM BLD-SCNC: 140 MMOL/L (ref 136–145)
SPECIFIC GRAVITY UA: >=1.03 (ref 1–1.03)
TCO2 CALC VENOUS: 29 MMOL/L
TOTAL PROTEIN: 6.6 G/DL (ref 6.4–8.2)
TROPONIN: <0.01 NG/ML
URINE REFLEX TO CULTURE: ABNORMAL
URINE TYPE: ABNORMAL
UROBILINOGEN, URINE: 0.2 E.U./DL
WBC # BLD: 4.8 K/UL (ref 4–11)
WBC UA: ABNORMAL /HPF (ref 0–5)

## 2022-12-10 PROCEDURE — G0378 HOSPITAL OBSERVATION PER HR: HCPCS

## 2022-12-10 PROCEDURE — 6360000004 HC RX CONTRAST MEDICATION: Performed by: EMERGENCY MEDICINE

## 2022-12-10 PROCEDURE — 93010 ELECTROCARDIOGRAM REPORT: CPT | Performed by: INTERNAL MEDICINE

## 2022-12-10 PROCEDURE — 82010 KETONE BODYS QUAN: CPT

## 2022-12-10 PROCEDURE — 6370000000 HC RX 637 (ALT 250 FOR IP): Performed by: EMERGENCY MEDICINE

## 2022-12-10 PROCEDURE — 96374 THER/PROPH/DIAG INJ IV PUSH: CPT

## 2022-12-10 PROCEDURE — 71260 CT THORAX DX C+: CPT | Performed by: EMERGENCY MEDICINE

## 2022-12-10 PROCEDURE — 2500000003 HC RX 250 WO HCPCS: Performed by: EMERGENCY MEDICINE

## 2022-12-10 PROCEDURE — 2580000003 HC RX 258: Performed by: NURSE PRACTITIONER

## 2022-12-10 PROCEDURE — 85025 COMPLETE CBC W/AUTO DIFF WBC: CPT

## 2022-12-10 PROCEDURE — 81001 URINALYSIS AUTO W/SCOPE: CPT

## 2022-12-10 PROCEDURE — 84484 ASSAY OF TROPONIN QUANT: CPT

## 2022-12-10 PROCEDURE — 83880 ASSAY OF NATRIURETIC PEPTIDE: CPT

## 2022-12-10 PROCEDURE — 87636 SARSCOV2 & INF A&B AMP PRB: CPT

## 2022-12-10 PROCEDURE — 99285 EMERGENCY DEPT VISIT HI MDM: CPT

## 2022-12-10 PROCEDURE — 93005 ELECTROCARDIOGRAM TRACING: CPT | Performed by: EMERGENCY MEDICINE

## 2022-12-10 PROCEDURE — 1200000000 HC SEMI PRIVATE

## 2022-12-10 PROCEDURE — 82803 BLOOD GASES ANY COMBINATION: CPT

## 2022-12-10 PROCEDURE — 83605 ASSAY OF LACTIC ACID: CPT

## 2022-12-10 PROCEDURE — 85379 FIBRIN DEGRADATION QUANT: CPT

## 2022-12-10 PROCEDURE — 36415 COLL VENOUS BLD VENIPUNCTURE: CPT

## 2022-12-10 PROCEDURE — 71045 X-RAY EXAM CHEST 1 VIEW: CPT

## 2022-12-10 PROCEDURE — 80053 COMPREHEN METABOLIC PANEL: CPT

## 2022-12-10 PROCEDURE — 6370000000 HC RX 637 (ALT 250 FOR IP): Performed by: NURSE PRACTITIONER

## 2022-12-10 RX ORDER — ACETAMINOPHEN 650 MG/1
650 SUPPOSITORY RECTAL EVERY 6 HOURS PRN
Status: DISCONTINUED | OUTPATIENT
Start: 2022-12-10 | End: 2022-12-16 | Stop reason: HOSPADM

## 2022-12-10 RX ORDER — SODIUM CHLORIDE 0.9 % (FLUSH) 0.9 %
5-40 SYRINGE (ML) INJECTION EVERY 12 HOURS SCHEDULED
Status: DISCONTINUED | OUTPATIENT
Start: 2022-12-10 | End: 2022-12-16 | Stop reason: HOSPADM

## 2022-12-10 RX ORDER — POLYETHYLENE GLYCOL 3350 17 G/17G
17 POWDER, FOR SOLUTION ORAL DAILY PRN
Status: DISCONTINUED | OUTPATIENT
Start: 2022-12-10 | End: 2022-12-16 | Stop reason: HOSPADM

## 2022-12-10 RX ORDER — INSULIN GLARGINE 100 [IU]/ML
22 INJECTION, SOLUTION SUBCUTANEOUS EVERY MORNING
Status: DISCONTINUED | OUTPATIENT
Start: 2022-12-11 | End: 2022-12-11

## 2022-12-10 RX ORDER — ATORVASTATIN CALCIUM 10 MG/1
20 TABLET, FILM COATED ORAL DAILY
Status: DISCONTINUED | OUTPATIENT
Start: 2022-12-11 | End: 2022-12-16 | Stop reason: HOSPADM

## 2022-12-10 RX ORDER — IBUPROFEN 400 MG/1
400 TABLET ORAL ONCE
Status: COMPLETED | OUTPATIENT
Start: 2022-12-10 | End: 2022-12-10

## 2022-12-10 RX ORDER — PALIPERIDONE 3 MG/1
9 TABLET, EXTENDED RELEASE ORAL EVERY MORNING
Status: DISCONTINUED | OUTPATIENT
Start: 2022-12-11 | End: 2022-12-16 | Stop reason: HOSPADM

## 2022-12-10 RX ORDER — DEXTROSE MONOHYDRATE 100 MG/ML
INJECTION, SOLUTION INTRAVENOUS CONTINUOUS PRN
Status: DISCONTINUED | OUTPATIENT
Start: 2022-12-10 | End: 2022-12-16 | Stop reason: HOSPADM

## 2022-12-10 RX ORDER — SODIUM CHLORIDE 9 MG/ML
INJECTION, SOLUTION INTRAVENOUS PRN
Status: DISCONTINUED | OUTPATIENT
Start: 2022-12-10 | End: 2022-12-16 | Stop reason: HOSPADM

## 2022-12-10 RX ORDER — INSULIN LISPRO 100 [IU]/ML
0-4 INJECTION, SOLUTION INTRAVENOUS; SUBCUTANEOUS NIGHTLY
Status: DISCONTINUED | OUTPATIENT
Start: 2022-12-10 | End: 2022-12-11

## 2022-12-10 RX ORDER — ENOXAPARIN SODIUM 100 MG/ML
40 INJECTION SUBCUTANEOUS DAILY
Status: DISCONTINUED | OUTPATIENT
Start: 2022-12-11 | End: 2022-12-13

## 2022-12-10 RX ORDER — OXYCODONE AND ACETAMINOPHEN 7.5; 325 MG/1; MG/1
1 TABLET ORAL EVERY 6 HOURS PRN
Status: DISCONTINUED | OUTPATIENT
Start: 2022-12-10 | End: 2022-12-16 | Stop reason: HOSPADM

## 2022-12-10 RX ORDER — TRAZODONE HYDROCHLORIDE 50 MG/1
200 TABLET ORAL NIGHTLY
Status: DISCONTINUED | OUTPATIENT
Start: 2022-12-10 | End: 2022-12-16 | Stop reason: HOSPADM

## 2022-12-10 RX ORDER — LISINOPRIL 20 MG/1
40 TABLET ORAL DAILY
Status: DISCONTINUED | OUTPATIENT
Start: 2022-12-11 | End: 2022-12-16 | Stop reason: HOSPADM

## 2022-12-10 RX ORDER — FERROUS SULFATE 325(65) MG
325 TABLET ORAL
Status: DISCONTINUED | OUTPATIENT
Start: 2022-12-11 | End: 2022-12-14

## 2022-12-10 RX ORDER — ACETAMINOPHEN 325 MG/1
650 TABLET ORAL EVERY 4 HOURS PRN
Status: DISCONTINUED | OUTPATIENT
Start: 2022-12-10 | End: 2022-12-16 | Stop reason: HOSPADM

## 2022-12-10 RX ORDER — ASPIRIN 81 MG/1
81 TABLET ORAL DAILY
Status: DISCONTINUED | OUTPATIENT
Start: 2022-12-11 | End: 2022-12-13

## 2022-12-10 RX ORDER — ONDANSETRON 4 MG/1
4 TABLET, ORALLY DISINTEGRATING ORAL EVERY 8 HOURS PRN
Status: DISCONTINUED | OUTPATIENT
Start: 2022-12-10 | End: 2022-12-16 | Stop reason: HOSPADM

## 2022-12-10 RX ORDER — LABETALOL HYDROCHLORIDE 5 MG/ML
10 INJECTION, SOLUTION INTRAVENOUS ONCE
Status: COMPLETED | OUTPATIENT
Start: 2022-12-10 | End: 2022-12-10

## 2022-12-10 RX ORDER — PANTOPRAZOLE SODIUM 40 MG/1
40 TABLET, DELAYED RELEASE ORAL DAILY
Status: DISCONTINUED | OUTPATIENT
Start: 2022-12-11 | End: 2022-12-16 | Stop reason: HOSPADM

## 2022-12-10 RX ORDER — INSULIN LISPRO 100 [IU]/ML
0-4 INJECTION, SOLUTION INTRAVENOUS; SUBCUTANEOUS
Status: DISCONTINUED | OUTPATIENT
Start: 2022-12-11 | End: 2022-12-11

## 2022-12-10 RX ORDER — ONDANSETRON 2 MG/ML
4 INJECTION INTRAMUSCULAR; INTRAVENOUS EVERY 6 HOURS PRN
Status: DISCONTINUED | OUTPATIENT
Start: 2022-12-10 | End: 2022-12-16 | Stop reason: HOSPADM

## 2022-12-10 RX ORDER — ACETAMINOPHEN 325 MG/1
650 TABLET ORAL EVERY 6 HOURS PRN
Status: DISCONTINUED | OUTPATIENT
Start: 2022-12-10 | End: 2022-12-16 | Stop reason: HOSPADM

## 2022-12-10 RX ORDER — CLONAZEPAM 0.5 MG/1
0.5 TABLET ORAL 3 TIMES DAILY PRN
Status: DISCONTINUED | OUTPATIENT
Start: 2022-12-10 | End: 2022-12-16 | Stop reason: HOSPADM

## 2022-12-10 RX ORDER — SODIUM CHLORIDE 0.9 % (FLUSH) 0.9 %
5-40 SYRINGE (ML) INJECTION PRN
Status: DISCONTINUED | OUTPATIENT
Start: 2022-12-10 | End: 2022-12-16 | Stop reason: HOSPADM

## 2022-12-10 RX ADMIN — TRAZODONE HYDROCHLORIDE 200 MG: 50 TABLET ORAL at 22:51

## 2022-12-10 RX ADMIN — IBUPROFEN 400 MG: 400 TABLET, FILM COATED ORAL at 15:30

## 2022-12-10 RX ADMIN — SODIUM CHLORIDE, PRESERVATIVE FREE 10 ML: 5 INJECTION INTRAVENOUS at 22:52

## 2022-12-10 RX ADMIN — ACETAMINOPHEN 650 MG: 325 TABLET ORAL at 15:29

## 2022-12-10 RX ADMIN — IOPAMIDOL 75 ML: 755 INJECTION, SOLUTION INTRAVENOUS at 19:25

## 2022-12-10 RX ADMIN — TICAGRELOR 90 MG: 90 TABLET ORAL at 22:51

## 2022-12-10 RX ADMIN — LABETALOL HYDROCHLORIDE 10 MG: 5 INJECTION, SOLUTION INTRAVENOUS at 21:25

## 2022-12-10 RX ADMIN — OXYCODONE AND ACETAMINOPHEN 1 TABLET: 7.5; 325 TABLET ORAL at 22:58

## 2022-12-10 ASSESSMENT — ENCOUNTER SYMPTOMS
NAUSEA: 0
SHORTNESS OF BREATH: 0
WHEEZING: 0
APNEA: 0
BLOOD IN STOOL: 0
COLOR CHANGE: 0
DIARRHEA: 1
COUGH: 0
RHINORRHEA: 0
VOMITING: 0

## 2022-12-10 ASSESSMENT — PAIN DESCRIPTION - DESCRIPTORS: DESCRIPTORS: ACHING

## 2022-12-10 ASSESSMENT — PAIN DESCRIPTION - LOCATION: LOCATION: GENERALIZED

## 2022-12-10 ASSESSMENT — PAIN SCALES - GENERAL
PAINLEVEL_OUTOF10: 3
PAINLEVEL_OUTOF10: 1
PAINLEVEL_OUTOF10: 8
PAINLEVEL_OUTOF10: 9

## 2022-12-10 ASSESSMENT — PAIN DESCRIPTION - ORIENTATION: ORIENTATION: OTHER (COMMENT)

## 2022-12-10 ASSESSMENT — PAIN - FUNCTIONAL ASSESSMENT: PAIN_FUNCTIONAL_ASSESSMENT: 0-10

## 2022-12-10 NOTE — ED NOTES
Unable to obtain blood draw x 2 RNs, multiple attempts each. Phlebot to come draw.       Yo Santos, RN  12/10/22 [id number]

## 2022-12-10 NOTE — ED PROVIDER NOTES
I independently examined and evaluated Khai Pace. In brief, patient is a 18-year-old female presents to the emergency department for evaluation of chest pain, calf pains. Reports her pain started yesterday. Denies having any fevers, chills, cough, congestion. No shortness of breath. Says she has cramping in both calves. Focused exam revealed female who appears uncomfortable. Nondiaphoretic. No significant lower extremity edema present. Positive Homans' sign bilaterally. Was found to have elevated D-dimer of 1.36. . Troponin at 0 and 3 hours are negative. COVID and influenza negative. CT shows no PE, apparent linear filling defects within the mid descending thoracic aorta which may be an small tear versus artifact. CT surgery consulted. CT surgery recommends keeping blood pressure less than 949 systolic. Heart rate is 60. Pharmacy consulted. Recommends giving labetalol and if patient warrants further doses, to start esmolol. Patient given 10 IV labetalol. 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 Hospitalist consulted for admission for further evaluation and treatment. All diagnostic, treatment, and disposition decisions were made by myself in conjunction with the advanced practice provider/resident physician. I personally saw the patient and performed a substantive portion of the visit including aspects of the medical decision making. I personally saw the patient and independently provided 30 minutes of non-concurrent critical care out of the total shared critical care time provided. Comment: Please note this report has been produced using speech recognition software and may contain errors related to that system including errors in grammar, punctuation, and spelling, as well as words and phrases that may be inappropriate.  If there are any questions or concerns please feel free to contact the dictating provider for clarification. For all further details of the patient's emergency department visit, please see the advanced practice provider's documentation.         Ela Rome MD  12/10/22 [id number]

## 2022-12-10 NOTE — ED PROVIDER NOTES
[facility]  ED  EMERGENCY DEPARTMENT ENCOUNTER      Pt Name: Leonela Bhatt  MRN: [id number]  [place] 1960  Date of evaluation: 12/10/2022  Provider: Toño Maciel, [address] [place] [address]       Chief Complaint   Patient presents with    Generalized Body Aches     Pt to ED via EMS from home with generalized body aches since yesterday. Pt denies cough, reports SOB and chills         HISTORY OF PRESENT ILLNESS   (Location/Symptom, Timing/Onset, Context/Setting, Quality, Duration, Modifying Factors, Severity)  Note limiting factors. Leonela Bhatt is a 60 y.o. female who presents to the emergency department due to generalized body aches. Patient is a 79-year-old female who is presenting to the emergency department due to generalized body aches. She states that these aches have been going on for the past 2 days. She rates the aches 8/10. She does not recall having any fever at home or chills. She states that she has not experienced any additional flulike symptoms. She also states that starting yesterday she has also been experiencing some chest pain. The history is provided by the patient. Nursing Notes were reviewed. REVIEW OF SYSTEMS    (2-9 systems for level 4, 10 or more for level 5)     Review of Systems   Constitutional:  Positive for fatigue. Negative for chills and fever. HENT:  Negative for ear pain and rhinorrhea. Eyes:  Negative for visual disturbance. Respiratory:  Negative for apnea, cough, shortness of breath and wheezing. Cardiovascular:  Positive for chest pain. Gastrointestinal:  Positive for diarrhea. Negative for blood in stool, nausea and vomiting. Genitourinary:  Negative for flank pain. Musculoskeletal:  Positive for myalgias. Skin:  Negative for color change and rash. Neurological:  Negative for tremors and light-headedness. Psychiatric/Behavioral:  Negative for confusion.       Except as noted above the remainder of the review of systems was reviewed and negative. PAST MEDICAL HISTORY     Past Medical History:   Diagnosis Date    Abnormal brain MRI 7/20/2017    Partially empty sella and minimal chronic small vessel ischemic disease    Acute bilateral low back pain without sciatica 11/2/2016    SHARRON (acute kidney injury) ([facility] [address] 7/5/2017    Arthritis     back    Bipolar disorder ([address] 10/18/2008    CAD (coronary artery disease)     stent placed 6/8/20    Cancer ([address] 2015    bilateral breast:s/p lumpectomy/radiation:under care care of breast specialist:Dr. Boone     Carotid stenosis, bilateral:<50%:per US 7/2016 7/15/2016    Carpal tunnel syndrome 10/18/2008    Cervical cancer screening 2014    Nml per pt'. Coronary artery disease of native artery of native heart with stable angina pectoris ([facility] [address] 6/9/2020    DDD (degenerative disc disease), lumbar 7/18/2018    Depression     under care of pschiatrist:Dr. 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)     Non morbid obesity 7/1/2016    Pancreatitis 5/12/16    [facility] hospitalization 5/12/16-5/16/16:under care of GI:chronic pancreatitis    S/P endoscopy 6/14/2016    [facility]:per pt' & her family member was nml.     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 myelopathy or radiculopathy 3/10/2017    Transient cerebral ischemia 07/15/2016    TIA:7/10/16    Unspecified cerebral artery occlusion with cerebral infarction     TIA         SURGICAL HISTORY       Past Surgical History:   Procedure Laterality Date    BREAST LUMPECTOMY  2015    Bilateral:breast cancer    CARDIAC CATHETERIZATION  06/08/2020    Dr. Brock Alu [facility]), DAYANA to Diag 1    COLONOSCOPY N/A 2/1/2021    COLONOSCOPY DIAGNOSTIC performed by Bharat Taylor MD at [address]  2/3/2021    CT BONE MARROW BIOPSY 2/3/2021 Kehsa Bynum MD [facility] CT SCAN    HYSTERECTOMY (CERVIX STATUS UNKNOWN)      Benign:no cervical cancer per pt'    KIDNEY REMOVAL      right    OTHER SURGICAL HISTORY Right     orif right ankle    TEMPORAL ARTERY BIOPSY Right 8/9/2021    RIGHT TEMPORAL ARTERY BIOPSY LIGATION performed by Sadia Garnett MD at [address] [facility] 1/29/2021    EGD BIOPSY performed by Bharat Taylor MD at [address]       Previous Medications    ASPIRIN 81 MG TABLET    Take 81 mg by mouth daily    ATORVASTATIN (LIPITOR) 20 MG TABLET    TAKE 1 TABLET BY MOUTH EVERY DAY    BLOOD GLUCOSE MONITORING SUPPL (TRUE METRIX METER) W/DEVICE KIT    Used to  check blood sugar 3 times eyad    BLOOD GLUCOSE TEST STRIPS (TRUE METRIX BLOOD GLUCOSE TEST) STRIP    As needed. CALCIUM CARBONATE-VITAMIN D (CALTRATE) 600-400 MG-UNIT TABS PER TAB    Take 1 tablet by mouth daily     CLONAZEPAM (KLONOPIN) 0.5 MG TABLET    Take 0.5 mg by mouth 3 times daily as needed. FERROUS SULFATE (IRON 325) 325 (65 FE) MG TABLET    Take 325 mg by mouth daily (with breakfast)    GABAPENTIN (NEURONTIN) 600 MG TABLET    Take 0.5 tablets by mouth 2 times daily for 3 days.     GLIMEPIRIDE (AMARYL) 4 MG TABLET    One tab daily with breakfast    INSULIN GLARGINE (LANTUS SOLOSTAR) 100 UNIT/ML INJECTION PEN    Inject 22 Units into the skin every morning    INSULIN LISPRO, 1 UNIT DIAL, (HUMALOG/ADMELOG) 100 UNIT/ML SOPN    With meals                   - at bedtime    < 150 ---   5 units            0 units  151-200 -- 6 units            0 units  201-250 :  7 units            1 units  251-300:   8 units            2 units  301-350:   9 units            3 units  >350 :       10 units          4 units  Upto 30 units/day    INSULIN PEN NEEDLE 32G X 4 MM MISC    1 each by Does not apply route daily    LIRAGLUTIDE (VICTOZA) 18 MG/3ML SOPN SC INJECTION    INJECT 1.8 MG UNDER THE SKIN ONCE DAILY    LISINOPRIL (PRINIVIL;ZESTRIL) 40 MG TABLET    TAKE 1 TABLET BY MOUTH EVERY DAY    NITROGLYCERIN (NITROSTAT) 0.4 MG SL TABLET    up to max of 3 total doses. If no relief after 1 dose, call 911. OXYCODONE-ACETAMINOPHEN (PERCOCET) 7.5-325 MG PER TABLET    TAKE 1 TABLET BY MOUTH EVERY 6 (SIX) HOURS AS NEEDED FOR UP TO 28 DAYS.     PALIPERIDONE (INVEGA) 9 MG EXTENDED RELEASE TABLET    Take 9 mg by mouth every morning    PANTOPRAZOLE (PROTONIX) 40 MG TABLET    Take 40 mg by mouth daily     PIOGLITAZONE (ACTOS) 30 MG TABLET    Take 30 mg by mouth daily    TICAGRELOR (BRILINTA) 90 MG TABS TABLET    TAKE 1 TABLET BY MOUTH TWICE A DAY    TRAZODONE (DESYREL) 100 MG TABLET    Take 200 mg by mouth nightly        ALLERGIES     Morphine, Penicillins, Codeine, and Penicillin g    FAMILY HISTORY       Family History   Problem Relation Age of Onset    Cancer Mother         breast    Cancer Father     Heart Failure Neg Hx     High Cholesterol Neg Hx     Hypertension Neg Hx     Migraines Neg Hx     Rashes/Skin Problems Neg Hx     Seizures Neg Hx     Stroke Neg Hx     Thyroid Disease Neg Hx     Diabetes Neg Hx           SOCIAL HISTORY       Social History     Socioeconomic History    Marital status:      Spouse name: None    Number of children: 1    Years of education: None    Highest education level: None   Occupational History    Occupation:    Tobacco Use    Smoking status: Former     Packs/day: 0.50     Years: 20.00     Pack years: 10.00     Types: Cigarettes, Cigars     Quit date: 7/3/2014     Years since quittin.4    Smokeless tobacco: Never   Vaping Use    Vaping Use: Never used   Substance and Sexual Activity    Alcohol use: No     Alcohol/week: 0.0 standard drinks    Drug use: No       SCREENINGS        Alberto Coma Scale  Eye Opening: Spontaneous  Best Verbal Response: Oriented  Best Motor Response: Obeys commands  Alberto Coma Scale Score: 15               PHYSICAL EXAM    (up to 7 for level 4, 8 or more for level 5)     ED Triage Vitals [12/10/22 1252]   BP Temp Temp Source Heart Rate Resp SpO2 Height Weight   (!) 179/58 97.9 °F (36.6 °C) Oral 65 16 100 % 5' 3\" (1.6 m) 119 lb (54 kg)       Physical Exam  Constitutional:       General: She is not in acute distress. Appearance: She is not diaphoretic. HENT:      Head: Normocephalic. Cardiovascular:      Rate and Rhythm: Normal rate and regular rhythm. Pulses: Normal pulses. Heart sounds: Normal heart sounds. Pulmonary:      Effort: Pulmonary effort is normal.      Breath sounds: Normal breath sounds. Abdominal:      General: Abdomen is flat. Palpations: Abdomen is soft. Musculoskeletal:      Cervical back: Neck supple. Skin:     General: Skin is warm. Neurological:      General: No focal deficit present. Mental Status: She is alert and oriented to person, place, and time. Psychiatric:         Mood and Affect: Mood normal.         Behavior: Behavior normal.       DIAGNOSTIC RESULTS     EKG: All EKG's are interpreted by the Emergency Department Physician who either signs or Co-signs this chart in the absence of a cardiologist.    Sinus bradycardia, normal EKG    RADIOLOGY:   Non-plain film images such as CT, Ultrasound and MRI are read by the radiologist. Plain radiographic images are visualized and preliminarily interpreted by the emergency physician with the below findings:    Interpretation per the Radiologist below, if available at the time of this note:    CT CHEST PULMONARY EMBOLISM W CONTRAST   Final Result   1. No pulmonary embolism identified. 2.  Apparent linear filling defect within the mid descending thoracic aorta   is thought to be artifactual, rather than due to intimal tear/focal   dissection.   If there is clinical concern for acute aortic dissection, CTA   chest dissection protocol would be recommended for further evaluation. 3.  Abnormal extensive mixed sclerosis/lucency throughout the spine. Differential considerations include myeloma versus metastases. Clinical   correlation recommended. 4.  Additional nonemergent findings, as above. XR CHEST PORTABLE   Final Result   No acute process.          CTA CHEST ABDOMEN PELVIS W CONTRAST    (Results Pending)         ED BEDSIDE ULTRASOUND:   Performed by ED Physician - none    LABS:  Labs Reviewed   CBC WITH AUTO DIFFERENTIAL - Abnormal; Notable for the following components:       Result Value    RBC 3.66 (*)     Hemoglobin 10.7 (*)     Hematocrit 32.5 (*)     RDW 11.7 (*)     All other components within normal limits   COMPREHENSIVE METABOLIC PANEL - Abnormal; Notable for the following components:    Glucose 213 (*)     Alkaline Phosphatase 191 (*)     ALT 77 (*)     AST 45 (*)     All other components within normal limits   BRAIN NATRIURETIC PEPTIDE - Abnormal; Notable for the following components:    Pro- (*)     All other components within normal limits   D-DIMER, QUANTITATIVE - Abnormal; Notable for the following components:    D-Dimer, Quant 1.36 (*)     All other components within normal limits   BLOOD GAS, VENOUS - Abnormal; Notable for the following components:    pH, Kyle 7.346 (*)     pCO2, Kyle 51.2 (*)     Carboxyhemoglobin 2.1 (*)     All other components within normal limits   URINALYSIS WITH REFLEX TO CULTURE - Abnormal; Notable for the following components:    Glucose, Ur >=1000 (*)     Blood, Urine MODERATE (*)     Protein,  (*)     All other components within normal limits   MICROSCOPIC URINALYSIS - Abnormal; Notable for the following components:    RBC, UA 5-10 (*)     Epithelial Cells, UA 11-20 (*)     Bacteria, UA 2+ (*)     All other components within normal limits   POCT GLUCOSE - Abnormal; Notable for the following components: POC Glucose 254 (*)     All other components within normal limits   COVID-19 & INFLUENZA COMBO   LACTATE, SEPSIS   TROPONIN   TROPONIN   BETA-HYDROXYBUTYRATE   POCT GLUCOSE       All other labs were within normal range or not returned as of this dictation. EMERGENCY DEPARTMENT COURSE and DIFFERENTIAL DIAGNOSIS/MDM:   Vitals:    Vitals:    12/10/22 1830 12/10/22 1905 12/10/22 2005 12/10/22 2105   BP: (!) 173/67 (!) 171/83 (!) 158/71 (!) 163/64   Pulse: 62 59 60 63   Resp: 18 18 16 16   Temp:       TempSrc:       SpO2: 100% 100% 99% 100%   Weight:       Height:             MDM  Number of Diagnoses or Management Options  Diagnosis management comments: Upon my examination, patient states that she is still having total body myalgia. Ddx: PE, systemic infection, ACS, other  Patient's lab results came back with elevated D-dimer with normal lactic acid indicating possible PE. Work-up will be done in order to rule out for possible pulmonary embolism  CT PE did not indicate concern for PE however it did indicate concern for possible aortic dissection. Cardiothoracic surgery was consulted at this time. Patient was started on labetalol to lower blood pressure due to concerns of possible aortic dissection, with cardiothoracic recommendations to lower blood pressure to systolic less than 837. Cause of elevated D-dimer not identified, possibly DVT. We will admit patient to hospital floors for further workup for DVT and rule out possible aortic dissection. REASSESSMENT              CONSULTS:  Evelio Salamanca 84 CONSULT TO HOSPITALIST    PROCEDURES:  Unless otherwise noted below, none     Procedures    FINAL IMPRESSION      1. Abnormal CT of the chest    2. Bilateral calf pain          DISPOSITION/PLAN   DISPOSITION Decision To Admit 12/10/2022 09:45:50 PM      PATIENT REFERRED TO:  No follow-up provider specified.     DISCHARGE MEDICATIONS:  New Prescriptions    No medications on file Controlled Substances Monitoring:     RX Monitoring 7/22/2015   Attestation The Prescription Monitoring Report for this patient was reviewed today. Periodic Controlled Substance Monitoring Possible medication side effects, risk of tolerance and/or dependence, and alternative treatments discussed; No signs of potential drug abuse or diversion identified.        (Please note that portions of this note were completed with a voice recognition program.  Efforts were made to edit the dictations but occasionally words are mis-transcribed.)    Alejandra Xie DO (electronically signed)  Resident Physician       Alejandra Xie DO  Resident  12/10/22 0487

## 2022-12-10 NOTE — ED NOTES
CT called for update on ETA for scan. States oRmelia Redd is next. \" Dr. Pang Pro updated.       Nenita Rome RN  12/10/22 1779

## 2022-12-11 LAB
ANION GAP SERPL CALCULATED.3IONS-SCNC: 7 MMOL/L (ref 3–16)
ANION GAP SERPL CALCULATED.3IONS-SCNC: 8 MMOL/L (ref 3–16)
APTT: 24.8 SEC (ref 23–34.3)
BASOPHILS ABSOLUTE: 0 K/UL (ref 0–0.2)
BASOPHILS RELATIVE PERCENT: 0.4 %
BETA-HYDROXYBUTYRATE: 0.13 MMOL/L (ref 0–0.27)
BUN BLDV-MCNC: 11 MG/DL (ref 7–20)
BUN BLDV-MCNC: 12 MG/DL (ref 7–20)
CALCIUM SERPL-MCNC: 8.6 MG/DL (ref 8.3–10.6)
CALCIUM SERPL-MCNC: 8.7 MG/DL (ref 8.3–10.6)
CHLORIDE BLD-SCNC: 100 MMOL/L (ref 99–110)
CHLORIDE BLD-SCNC: 104 MMOL/L (ref 99–110)
CO2: 27 MMOL/L (ref 21–32)
CO2: 27 MMOL/L (ref 21–32)
CREAT SERPL-MCNC: 1.1 MG/DL (ref 0.6–1.2)
CREAT SERPL-MCNC: 1.1 MG/DL (ref 0.6–1.2)
EOSINOPHILS ABSOLUTE: 0 K/UL (ref 0–0.6)
EOSINOPHILS RELATIVE PERCENT: 0.9 %
GFR SERPL CREATININE-BSD FRML MDRD: 56 ML/MIN/{1.73_M2}
GFR SERPL CREATININE-BSD FRML MDRD: 56 ML/MIN/{1.73_M2}
GLUCOSE BLD-MCNC: 127 MG/DL (ref 70–99)
GLUCOSE BLD-MCNC: 214 MG/DL (ref 70–99)
GLUCOSE BLD-MCNC: 254 MG/DL (ref 70–99)
GLUCOSE BLD-MCNC: 254 MG/DL (ref 70–99)
GLUCOSE BLD-MCNC: 264 MG/DL (ref 70–99)
GLUCOSE BLD-MCNC: 369 MG/DL (ref 70–99)
GLUCOSE BLD-MCNC: 416 MG/DL (ref 70–99)
GLUCOSE BLD-MCNC: 465 MG/DL (ref 70–99)
GLUCOSE BLD-MCNC: 541 MG/DL (ref 70–99)
GLUCOSE BLD-MCNC: 83 MG/DL (ref 70–99)
HCT VFR BLD CALC: 30.9 % (ref 36–48)
HEMOGLOBIN: 10.1 G/DL (ref 12–16)
INR BLD: 1.07 (ref 0.87–1.14)
IRON SATURATION: 33 % (ref 15–50)
IRON: 79 UG/DL (ref 37–145)
LYMPHOCYTES ABSOLUTE: 2.1 K/UL (ref 1–5.1)
LYMPHOCYTES RELATIVE PERCENT: 48.5 %
MAGNESIUM: 1.7 MG/DL (ref 1.8–2.4)
MCH RBC QN AUTO: 28.8 PG (ref 26–34)
MCHC RBC AUTO-ENTMCNC: 32.6 G/DL (ref 31–36)
MCV RBC AUTO: 88.4 FL (ref 80–100)
MONOCYTES ABSOLUTE: 0.2 K/UL (ref 0–1.3)
MONOCYTES RELATIVE PERCENT: 5.5 %
NEUTROPHILS ABSOLUTE: 2 K/UL (ref 1.7–7.7)
NEUTROPHILS RELATIVE PERCENT: 44.7 %
PDW BLD-RTO: 12 % (ref 12.4–15.4)
PERFORMED ON: ABNORMAL
PERFORMED ON: NORMAL
PLATELET # BLD: 151 K/UL (ref 135–450)
PMV BLD AUTO: 9.7 FL (ref 5–10.5)
POTASSIUM REFLEX MAGNESIUM: 4.3 MMOL/L (ref 3.5–5.1)
POTASSIUM SERPL-SCNC: 4.5 MMOL/L (ref 3.5–5.1)
PROTHROMBIN TIME: 13.9 SEC (ref 11.7–14.5)
RBC # BLD: 3.49 M/UL (ref 4–5.2)
SODIUM BLD-SCNC: 135 MMOL/L (ref 136–145)
SODIUM BLD-SCNC: 138 MMOL/L (ref 136–145)
TOTAL CK: 497 U/L (ref 26–192)
TOTAL IRON BINDING CAPACITY: 239 UG/DL (ref 260–445)
TROPONIN: <0.01 NG/ML
TROPONIN: <0.01 NG/ML
TSH REFLEX: 0.86 UIU/ML (ref 0.27–4.2)
WBC # BLD: 4.4 K/UL (ref 4–11)

## 2022-12-11 PROCEDURE — 85730 THROMBOPLASTIN TIME PARTIAL: CPT

## 2022-12-11 PROCEDURE — G0378 HOSPITAL OBSERVATION PER HR: HCPCS

## 2022-12-11 PROCEDURE — 84484 ASSAY OF TROPONIN QUANT: CPT

## 2022-12-11 PROCEDURE — 97530 THERAPEUTIC ACTIVITIES: CPT

## 2022-12-11 PROCEDURE — 6370000000 HC RX 637 (ALT 250 FOR IP): Performed by: INTERNAL MEDICINE

## 2022-12-11 PROCEDURE — 83540 ASSAY OF IRON: CPT

## 2022-12-11 PROCEDURE — 85025 COMPLETE CBC W/AUTO DIFF WBC: CPT

## 2022-12-11 PROCEDURE — 85610 PROTHROMBIN TIME: CPT

## 2022-12-11 PROCEDURE — 2580000003 HC RX 258: Performed by: NURSE PRACTITIONER

## 2022-12-11 PROCEDURE — 83550 IRON BINDING TEST: CPT

## 2022-12-11 PROCEDURE — 82550 ASSAY OF CK (CPK): CPT

## 2022-12-11 PROCEDURE — 36415 COLL VENOUS BLD VENIPUNCTURE: CPT

## 2022-12-11 PROCEDURE — 97161 PT EVAL LOW COMPLEX 20 MIN: CPT

## 2022-12-11 PROCEDURE — 6360000002 HC RX W HCPCS: Performed by: NURSE PRACTITIONER

## 2022-12-11 PROCEDURE — 84443 ASSAY THYROID STIM HORMONE: CPT

## 2022-12-11 PROCEDURE — 83735 ASSAY OF MAGNESIUM: CPT

## 2022-12-11 PROCEDURE — 1200000000 HC SEMI PRIVATE

## 2022-12-11 PROCEDURE — 6370000000 HC RX 637 (ALT 250 FOR IP): Performed by: NURSE PRACTITIONER

## 2022-12-11 PROCEDURE — 97116 GAIT TRAINING THERAPY: CPT

## 2022-12-11 PROCEDURE — 97165 OT EVAL LOW COMPLEX 30 MIN: CPT

## 2022-12-11 PROCEDURE — 97535 SELF CARE MNGMENT TRAINING: CPT

## 2022-12-11 PROCEDURE — 80048 BASIC METABOLIC PNL TOTAL CA: CPT

## 2022-12-11 PROCEDURE — 6360000002 HC RX W HCPCS: Performed by: INTERNAL MEDICINE

## 2022-12-11 PROCEDURE — 97110 THERAPEUTIC EXERCISES: CPT

## 2022-12-11 RX ORDER — INSULIN GLARGINE 100 [IU]/ML
30 INJECTION, SOLUTION SUBCUTANEOUS EVERY MORNING
Status: DISCONTINUED | OUTPATIENT
Start: 2022-12-12 | End: 2022-12-16 | Stop reason: HOSPADM

## 2022-12-11 RX ORDER — INSULIN LISPRO 100 [IU]/ML
0-4 INJECTION, SOLUTION INTRAVENOUS; SUBCUTANEOUS NIGHTLY
Status: DISCONTINUED | OUTPATIENT
Start: 2022-12-11 | End: 2022-12-16

## 2022-12-11 RX ORDER — MAGNESIUM SULFATE 1 G/100ML
1000 INJECTION INTRAVENOUS ONCE
Status: COMPLETED | OUTPATIENT
Start: 2022-12-11 | End: 2022-12-11

## 2022-12-11 RX ORDER — INSULIN LISPRO 100 [IU]/ML
0-16 INJECTION, SOLUTION INTRAVENOUS; SUBCUTANEOUS
Status: DISCONTINUED | OUTPATIENT
Start: 2022-12-11 | End: 2022-12-16

## 2022-12-11 RX ORDER — INSULIN LISPRO 100 [IU]/ML
0-16 INJECTION, SOLUTION INTRAVENOUS; SUBCUTANEOUS
Status: DISCONTINUED | OUTPATIENT
Start: 2022-12-11 | End: 2022-12-11

## 2022-12-11 RX ADMIN — TICAGRELOR 90 MG: 90 TABLET ORAL at 09:22

## 2022-12-11 RX ADMIN — LISINOPRIL 40 MG: 20 TABLET ORAL at 09:22

## 2022-12-11 RX ADMIN — INSULIN GLARGINE 22 UNITS: 100 INJECTION, SOLUTION SUBCUTANEOUS at 09:24

## 2022-12-11 RX ADMIN — PALIPERIDONE 9 MG: 3 TABLET, EXTENDED RELEASE ORAL at 09:22

## 2022-12-11 RX ADMIN — CLONAZEPAM 0.5 MG: 0.5 TABLET ORAL at 14:09

## 2022-12-11 RX ADMIN — SODIUM CHLORIDE, PRESERVATIVE FREE 10 ML: 5 INJECTION INTRAVENOUS at 09:24

## 2022-12-11 RX ADMIN — INSULIN LISPRO 2 UNITS: 100 INJECTION, SOLUTION INTRAVENOUS; SUBCUTANEOUS at 09:24

## 2022-12-11 RX ADMIN — FERROUS SULFATE TAB 325 MG (65 MG ELEMENTAL FE) 325 MG: 325 (65 FE) TAB at 09:22

## 2022-12-11 RX ADMIN — OXYCODONE AND ACETAMINOPHEN 1 TABLET: 7.5; 325 TABLET ORAL at 09:22

## 2022-12-11 RX ADMIN — TICAGRELOR 90 MG: 90 TABLET ORAL at 19:42

## 2022-12-11 RX ADMIN — INSULIN LISPRO 4 UNITS: 100 INJECTION, SOLUTION INTRAVENOUS; SUBCUTANEOUS at 12:39

## 2022-12-11 RX ADMIN — TRAZODONE HYDROCHLORIDE 200 MG: 50 TABLET ORAL at 19:42

## 2022-12-11 RX ADMIN — PANTOPRAZOLE SODIUM 40 MG: 40 TABLET, DELAYED RELEASE ORAL at 09:22

## 2022-12-11 RX ADMIN — ENOXAPARIN SODIUM 40 MG: 100 INJECTION SUBCUTANEOUS at 09:23

## 2022-12-11 RX ADMIN — INSULIN LISPRO 16 UNITS: 100 INJECTION, SOLUTION INTRAVENOUS; SUBCUTANEOUS at 14:45

## 2022-12-11 RX ADMIN — ATORVASTATIN CALCIUM 20 MG: 10 TABLET, FILM COATED ORAL at 09:22

## 2022-12-11 RX ADMIN — MAGNESIUM SULFATE HEPTAHYDRATE 1000 MG: 1 INJECTION, SOLUTION INTRAVENOUS at 09:34

## 2022-12-11 RX ADMIN — SODIUM CHLORIDE, PRESERVATIVE FREE 1 ML: 5 INJECTION INTRAVENOUS at 19:43

## 2022-12-11 RX ADMIN — OXYCODONE AND ACETAMINOPHEN 1 TABLET: 7.5; 325 TABLET ORAL at 23:38

## 2022-12-11 RX ADMIN — ASPIRIN 81 MG: 81 TABLET, COATED ORAL at 09:22

## 2022-12-11 ASSESSMENT — PAIN SCALES - GENERAL: PAINLEVEL_OUTOF10: 8

## 2022-12-11 NOTE — PROGRESS NOTES
4 Eyes Skin Assessment     The patient is being assess for  Admission    I agree that 2 RN's have performed a thorough Head to Toe Skin Assessment on the patient. ALL assessment sites listed below have been assessed. Areas assessed by both nurses: Alvin Stack RN  [x]   Head, Face, and Ears   [x]   Shoulders, Back, and Chest  [x]   Arms, Elbows, and Hands   [x]   Coccyx, Sacrum, and IschIum  [x]   Legs, Feet, and Heels        Does the Patient have Skin Breakdown?   No         Maco Prevention initiated:  No   Wound Care Orders initiated:  No      [facility] nurse consulted for Pressure Injury (Stage 3,4, Unstageable, DTI, NWPT, and Complex wounds), New and Established Ostomies:  No      Nurse 1 eSignature: Electronically signed by Víctor Cárdenas RN on 12/11/22 at 12:39 AM EST    **SHARE this note so that the co-signing nurse is able to place an eSignature**    Nurse 2 eSignature: Electronically signed by Manuel Parkinson RN on 12/11/22 at 1:59 AM EST

## 2022-12-11 NOTE — PROGRESS NOTES
Physical Therapy  Facility/Department: [place] [address] TELE/MED SURG/ONC  Physical Therapy Initial Assessment    Name: Magy Freeman  : 1960  MRN: [id number]  Date of Service: 2022    Discharge Recommendations:  Subacute/Skilled Nursing Facility   PT Equipment Recommendations  Equipment Needed: No  Other: defer      Patient Diagnosis(es): The primary encounter diagnosis was Abnormal CT of the chest. A diagnosis of Bilateral calf pain was also pertinent to this visit. Past Medical History:  has a past medical history of Abnormal brain MRI, Acute bilateral low back pain without sciatica, SHARRON (acute kidney injury) ([address] Arthritis, Bipolar disorder ([address] CAD (coronary artery disease), Cancer ([address] Carotid stenosis, bilateral:<50%:per [place] 2016, Carpal tunnel syndrome, Cervical cancer screening, Coronary artery disease of native artery of native heart with stable angina pectoris ([address] DDD (degenerative disc disease), lumbar, Depression, Depression/anxiety, Depression/anxiety, Diabetes mellitus ([address] Gout, History of mammogram, History of therapeutic radiation, Hyperlipidemia, Hypertension, 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 Past Surgical History:  has a past surgical history that includes Kidney removal; Hysterectomy; Breast lumpectomy (2015); Tubal ligation; other surgical history (Right); Cardiac catheterization (2020); Upper gastrointestinal endoscopy (N/A, 2021); Colonoscopy (N/A, 2021); CT BIOPSY BONE MARROW (2/3/2021); and Temporal Artery Biopsy (Right, 2021).     Assessment   Body Structures, Functions, Activity Limitations Requiring Skilled Therapeutic Intervention: Decreased functional mobility ; Decreased endurance;Decreased coordination;Decreased balance;Decreased strength;Decreased safe awareness;Decreased posture  Assessment: Pt is 61 yo female who presents with diagnosis of dyspnea and generalized weakness. Pt indep with mobility at baseline. Min-mod A x 2 for mobility with RW for safety and balance today. Decreased bilateral knee stability noted. Co-tx collaboration this date to safely meet goals and will have better occupational performance outcomes with in a co-treatment than 1:1 session. Pt would benefit from continued skilled therapy to address deficits. Recommend SNF at d/c due to frequent falls, 3 flights of stairs to apartment, and current deficits. Treatment Diagnosis: impaired functional mobility  Specific Instructions for Next Treatment: progress mobility as tolerated  Therapy Prognosis: Good  Decision Making: Low Complexity  Requires PT Follow-Up: Yes  Activity Tolerance  Activity Tolerance: Patient tolerated treatment well;Patient limited by fatigue;Patient limited by endurance; Patient limited by pain     Plan   Physcial Therapy Plan  General Plan: 3-5 times per week  Specific Instructions for Next Treatment: progress mobility as tolerated  Current Treatment Recommendations: Strengthening, Balance training, Gait training, Functional mobility training, Stair training, Neuromuscular re-education, Transfer training, Endurance training, Equipment evaluation, education, & procurement, Patient/Caregiver education & training, Safety education & training, Therapeutic activities, Home exercise program  Safety Devices  Type of Devices:  All fall risk precautions in place, Gait belt, Chair alarm in place, Call light within reach, Nurse notified, Left in chair     Restrictions  Restrictions/Precautions  Restrictions/Precautions: General Precautions, Fall Risk  Position Activity Restriction  Other position/activity restrictions: up with assist, purewick     Subjective   General  Chart Reviewed: Yes  Patient assessed for rehabilitation services?: Yes  Response To Previous Treatment: Not applicable  Family / Caregiver Present: No  Referring Practitioner: Dr. Tonja Mcmahan MD  Referral Date : 12/11/22  Diagnosis: Dyspnea  Follows Commands: Within Functional Limits  General Comment  Comments: Pt seated EOB with OT on approach  Subjective  Subjective: Pt agreeable to therapy. Reports generalized pain throughout session.  Did not rate         Social/Functional History  Social/Functional History  Lives With: Alone  Type of Home: Apartment  Home Layout: One level  Home Access: Stairs to enter with rails  Entrance Stairs - Number of Steps: 3 flights  Entrance Stairs - Rails: Right  Bathroom Shower/Tub: Tub/Shower unit, Shower chair with back  Bathroom Toilet: Standard  Bathroom Equipment: Toilet raiser, Shower chair  Home Equipment: Linda , rolling  Has the patient had two or more falls in the past year or any fall with injury in the past year?: Yes (10 falls in past year, pt reports without injury)  Receives Help From: Family, Friend(s)  ADL Assistance: Independent  Homemaking Responsibilities: Yes  Meal Prep Responsibility: Primary  Laundry Responsibility: Primary  Cleaning Responsibility: Primary  Shopping Responsibility: Primary  Ambulation Assistance:  (no AD when ambulating in home, in community uses a scooter)  Transfer Assistance: Independent (states hx of difficulty with transfers, when unable to pt waits until she \"feels better\")  Active : No  Patient's  Info: Friends or transportation  Leisure & Hobbies: go to [employer]  Vision/Hearing  Vision  Vision: Impaired  Vision Exceptions: Wears glasses at all times  Hearing  Hearing: Within functional limits    Cognition   Orientation  Overall Orientation Status: Within Functional Limits  Orientation Level: Oriented X4     Objective   Heart Rate: 55  Heart Rate Source: Monitor  BP: (!) 149/69  BP Location: Right upper arm  BP Method: Automatic  Patient Position: Semi fowlers  MAP (Calculated): 96  Resp: 16  SpO2: 98 %  O2 Device: None (Room air)        Gross Assessment  AROM: Generally decreased, functional  Strength: Generally decreased, functional  Coordination: Generally decreased, functional                 Bed Mobility Training  Bed Mobility Training: No (pt seated at start and end of session)    Balance  Sitting: Impaired (posterior lean)  Standing: Impaired  Standing - Static: Constant support  Standing - Dynamic: Constant support    Transfer Training  Transfer Training: Yes  Sit to Stand: Minimum assistance;Assist X2;Additional time; Adaptive equipment (cues for safety and technique. Pt stood from EOB and chair to RW. BLE buckling initially when attempting to stand from chair)  Stand to Sit: Minimum assistance;Assist X2;Additional time; Adaptive equipment    Gait Training  Gait Training: Yes  Gait  Overall Level of Assistance: Minimum assistance; Moderate assistance;Assist X2;Adaptive equipment; Additional time  Interventions: Safety awareness training; Tactile cues; Verbal cues  Base of Support: Widened  Speed/Olga: Shuffled;Pace decreased (< 100 feet/min)  Step Length: Left shortened;Right shortened  Gait Abnormalities: Shuffling gait; Decreased step clearance (Cues for posture and to maintain YOSSI within RW. Cues for increased bilateral step length with some improvement noted.  Assist for balance and to negotiate RW)  Distance (ft): 15 Feet  Assistive Device: Walker, rolling;Gait belt                 Exercise Treatment: x 10 BLE seated : ankle pumps, LAQ, marches, clamshell -- cues for technique and full ROM                          AM-PAC Score  AM-PAC Inpatient Mobility Raw Score : 14 (12/11/22 1400)  AM-PAC Inpatient T-Scale Score : 38.1 (12/11/22 1400)  Mobility Inpatient CMS 0-100% Score: 61.29 (12/11/22 1400)  Mobility Inpatient CMS G-Code Modifier : CL (12/11/22 1400) Goals  Short Term Goals  Time Frame for Short Term Goals: 1 week (12/18) unless otherwise specified  Short Term Goal 1: Pt will be CGA with bed mobility. Short Term Goal 2: Pt will be CGA with transfers with RW. Short Term Goal 3: Pt will ambulate 25 ft with min A and RW. Short Term Goal 4: 12/14: Pt will participate in 12-15 reps of BLE exercises to promote strength and activity tolerance. Patient Goals   Patient Goals : \"to get better\"       Education  Patient Education  Education Given To: Patient  Education Provided: Role of Therapy;Plan of Care;Home Exercise Program;Transfer Training; Fall Prevention Strategies  Education Method: Verbal  Barriers to Learning: None  Education Outcome: Verbalized understanding;Continued education needed      Therapy Time   Individual Concurrent Group Co-treatment   Time In 1234         Time Out 1257         Minutes 23         Timed Code Treatment Minutes: [id number] S Veronika Pratt, PT, DPT  If pt is unable to be seen after this session, please let this note serve as discharge summary. Please see case management note for discharge disposition. Thank you.

## 2022-12-11 NOTE — PROGRESS NOTES
Pt admitted to room 322 from ER. VSS. HR runs kari. Admission and shift assessment charted and completed. Pt oriented to room, call light, and telephone. Lung sounds clear. Active bowel sounds. Pt states she had previous fall at home today. Purewick applied. Bed alarm on. Prn percocet given for back pain. 4 eye skin assessment completed. BS was 69. Snacks provided. BS came back up. Bed alarm on. Bedside table and call light within reach. Pt verbalized understanding when calling out for bathroom. Nonskid socks on. Pt denies further needs or questions. Will continue to monitor.

## 2022-12-11 NOTE — PROGRESS NOTES
Occupational Therapy  Facility/Department: [facility] C3 TELE/MED SURG/ONC  Occupational Therapy Initial Assessment and Treatment Session    Name: Mimi Hawkins  : 1960  MRN: [id number]  Date of Service: 2022    Discharge Recommendations:  Subacute/Skilled Nursing Facility  OT Equipment Recommendations  Equipment Needed:  (defer to facilitiy)       Patient Diagnosis(es): The primary encounter diagnosis was Abnormal CT of the chest. A diagnosis of Bilateral calf pain was also pertinent to this visit. Past Medical History:  has a past medical history of Abnormal brain MRI, Acute bilateral low back pain without sciatica, SHARRON (acute kidney injury) ([address] Arthritis, Bipolar disorder ([address] CAD (coronary artery disease), Cancer ([address] Carotid stenosis, bilateral:<50%:per [place] 2016, Carpal tunnel syndrome, Cervical cancer screening, Coronary artery disease of native artery of native heart with stable angina pectoris ([address] DDD (degenerative disc disease), lumbar, Depression, Depression/anxiety, Depression/anxiety, Diabetes mellitus ([address] Gout, History of mammogram, History of therapeutic radiation, Hyperlipidemia, Hypertension, 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 Past Surgical History:  has a past surgical history that includes Kidney removal; Hysterectomy; Breast lumpectomy (2015); Tubal ligation; other surgical history (Right); Cardiac catheterization (2020); Upper gastrointestinal endoscopy (N/A, 2021); Colonoscopy (N/A, 2021); CT BIOPSY BONE MARROW (2/3/2021); and Temporal Artery Biopsy (Right, 2021).          Assessment   Performance deficits / Impairments: Decreased functional mobility ; Decreased safe awareness;Decreased balance;Decreased ADL status; Decreased strength;Decreased high-level IADLs  Assessment: Pt is a 59 y/o F admitted to [facility] with dx of weakness and dyspnea. Pt reports independence in ADLs/IADLs prior to admission. However, pt has hx of falls, reporting ~10 in the past year. Pt lives alone and reported a hx of difficulty transfering to/from the toilet and chairs. Co-tx collaboration this date to safely meet goals and will have better occupational performance outcomes with in a co-treatment than 1:1 session. On this date pt required minx2 for sit<>stand transfers and minx1 modx1 with use of RW to perform functional ambulation from bed to chair. Pt's knees buckled during one stand attempt, though able to perform subsequent stand with minAx2. Pt able to complete seated ADLs with setup A. D/t pt functioning below baseline, recommend SNF at d/c to support independence in ADLs. Cont skilled OT while in acute care. Prognosis: Good  Decision Making: Low Complexity  REQUIRES OT FOLLOW-UP: Yes  Activity Tolerance  Activity Tolerance: Patient Tolerated treatment well        Plan   Occupational Therapy Plan  Times Per Week: 3-5x  Current Treatment Recommendations: Strengthening, Functional mobility training, Endurance training, Safety education & training, Self-Care / ADL     Restrictions  Restrictions/Precautions  Restrictions/Precautions: General Precautions, Fall Risk  Required Braces or Orthoses?: No  Position Activity Restriction  Other position/activity restrictions: up with assist, purewick    Subjective   General  Chart Reviewed: Yes  Patient assessed for rehabilitation services?: Yes  Family / Caregiver Present: No  Referring Practitioner: Eva Maciel MD  Diagnosis: dyspnea  Subjective  Subjective: Pt agreeable to therapy. /71, HR 69. Denies pain.        Social/Functional History  Social/Functional History  Lives With: Alone  Type of Home: Apartment  Home Layout: One level  Home Access: Stairs to enter with rails  Entrance Stairs - Number of Steps: 3 flights  Entrance Stairs - Rails: Right  Bathroom Shower/Tub: Tub/Shower unit, Shower chair with back  Bathroom Toilet: Standard  Bathroom Equipment: Toilet raiser, Shower chair  Home Equipment: Ulice Bimler, rolling  Has the patient had two or more falls in the past year or any fall with injury in the past year?: Yes (10 falls in past year, pt reports without injury)  Receives Help From: Family, Friend(s)  ADL Assistance: Independent  Homemaking Responsibilities: Yes  Meal Prep Responsibility: Primary  Laundry Responsibility: Primary  Cleaning Responsibility: Primary  Shopping Responsibility: Primary  Ambulation Assistance:  (no AD when ambulating in home, in community uses a scooter)  Transfer Assistance: Independent (states hx of difficulty with transfers, when unable to pt waits until she \"feels better\")  Active : No  Patient's  Info: Friends or transportation  Leisure & Hobbies: go to [employer]       Objective   Heart Rate: 55  Heart Rate Source: Monitor  BP: (!) 149/69  BP Location: Right upper arm  BP Method: Automatic  Patient Position: Semi fowlers  MAP (Calculated): 96  Resp: 16  SpO2: 98 %  O2 Device: None (Room air)            Observation/Palpation  Posture: Fair  Safety Devices  Type of Devices: All fall risk precautions in place;Gait belt; Chair alarm in place;Call light within reach;Nurse notified; Left in chair  Bed Mobility Training  Bed Mobility Training: Yes  Overall Level of Assistance: Supervision;Stand-by assistance  Interventions: Verbal cues  Rolling: Stand-by assistance  Supine to Sit: Modified independent; Adaptive equipment; Additional time;Stand-by assistance (use of hand rails, HOB elevated)  Sit to Supine:  (pt in chair at end of session)  Scooting:  Additional time;Stand-by assistance (to scoot towards EOB)  Balance  Sitting: Impaired (post lean)  Sitting - Static: Fair (occasional)  Sitting - Dynamic: Poor (constant support); Occasional  Standing: Impaired  Standing - Static: Constant support  Standing - Dynamic: Constant support  Transfer Training  Transfer Training: Yes  Sit to Stand: Minimum assistance;Assist X2;Additional time; Adaptive equipment (cues for safety and technique. Pt stood from EOB and chair to RW. BLE buckling initially when attempting to stand from chair)  Stand to Sit: Minimum assistance;Assist X2;Additional time; Adaptive equipment (from RW to chair)       AROM: Generally decreased, functional  PROM: Generally decreased, functional  Strength: Generally decreased, functional (grossly 4/5 BUE)  Coordination: Generally decreased, functional  Tone: Normal  Sensation: Intact (though reports occasional bilateral hand/finger numbness)  ADL  Grooming: Setup  Grooming Skilled Clinical Factors: washing face, brushing gums/dentures seated in chair  LE Dressing: Stand by assistance  LE Dressing Skilled Clinical Factors: donning socks EOB  Toileting: Dependent/Total  Toileting Skilled Clinical Factors: Purwick       Activity Tolerance  Activity Tolerance: Patient tolerated treatment well;Patient limited by fatigue;Patient limited by endurance; Patient limited by pain          Vision  Vision: Impaired  Vision Exceptions: Wears glasses at all times  Hearing  Hearing: Within functional limits  Cognition  Overall Cognitive Status: Exceptions  Arousal/Alertness: Appropriate responses to stimuli  Following Commands: Follows one step commands with repetition; Follows one step commands with increased time  Attention Span: Attends with cues to redirect  Memory: Decreased short term memory  Safety Judgement: Decreased awareness of need for assistance  Problem Solving: Assistance required to identify errors made  Insights: Decreased awareness of deficits  Initiation: Does not require cues  Sequencing: Does not require cues  Cognition Comment: Pt with low arousal throughout session, requiring cues to redirect  Orientation  Overall Orientation Status: Within Functional Limits  Orientation Level: Oriented X4                 A/AROM Exercises: BUE therex: x10 shoulder flex/exten, elbow flex/exten, wrist flex/exten, gross grasp seated EOB, required cues for upright posture  Education Given To: Patient  Education Provided: Role of Therapy; ADL Adaptive Strategies; Plan of Care;Transfer Training  Education Provided Comments: Disease specific: importance of OOB  Education Method: Demonstration;Verbal  Barriers to Learning: None  Education Outcome: Verbalized understanding;Demonstrated understanding;Continued education needed                    AM-PAC Score   AM-PAC Inpatient Daily Activity Raw Score: 15 (12/11/22 1448)  AM-PAC Inpatient ADL T-Scale Score : 34.69 (12/11/22 1448)  ADL Inpatient CMS 0-100% Score: 56.46 (12/11/22 1448)  ADL Inpatient CMS G-Code Modifier : CK (12/11/22 1448)      Goals  Short Term Goals  Time Frame for Short Term Goals: 1 week (12/18/22) unless otherwise specified  Short Term Goal 1: Pt will perform toilet transfer with CGAx1 with use of RW by 12/16/22  Short Term Goal 2: Pt will perform LE dressing with CGA  Short Term Goal 3: Pt will perform 1-2 standing level ADLs for >5mins with SBA  Short Term Goal 4: Pt will perform 15 reps BUE therex to support independence in ADLs  Patient Goals   Patient goals :  To get better       Therapy Time   Individual Concurrent Group Co-treatment   Time In [id number]   Time Out 1237     1258   Minutes 30     21   Timed Code Treatment Minutes: 41 Minutes (10 min eval)       ELENA Covington/REG

## 2022-12-11 NOTE — PROGRESS NOTES
Hospitalist Progress Note      PCP: Simone Alan    Date of Admission: 12/10/2022    Chief Complaint:  generalized body aches     Hospital Course:  H & P reviewed      Subjective:        Pt complains of generalized body aches with generalized weakness. Denies any chest pain, fever or chills     Medications:  Reviewed    Infusion Medications    sodium chloride      dextrose       Scheduled Medications    magnesium sulfate  1,000 mg IntraVENous Once    aspirin  81 mg Oral Daily    atorvastatin  20 mg Oral Daily    ferrous sulfate  325 mg Oral Daily with breakfast    insulin glargine  22 Units SubCUTAneous QAM    Liraglutide  1.8 mg SubCUTAneous Daily    lisinopril  40 mg Oral Daily    paliperidone  9 mg Oral QAM    pantoprazole  40 mg Oral Daily    ticagrelor  90 mg Oral BID    traZODone  200 mg Oral Nightly    sodium chloride flush  5-40 mL IntraVENous 2 times per day    enoxaparin  40 mg SubCUTAneous Daily    insulin lispro  0-4 Units SubCUTAneous TID WC    insulin lispro  0-4 Units SubCUTAneous Nightly     PRN Meds: acetaminophen, iopamidol, clonazePAM, oxyCODONE-acetaminophen, sodium chloride flush, sodium chloride, ondansetron **OR** ondansetron, polyethylene glycol, acetaminophen **OR** acetaminophen, glucose, dextrose bolus **OR** dextrose bolus, glucagon (rDNA), dextrose      Intake/Output Summary (Last 24 hours) at 12/11/2022 0926  Last data filed at 12/11/2022 0546  Gross per 24 hour   Intake --   Output 400 ml   Net -400 ml       Physical Exam Performed:    BP (!) 148/73   Pulse 61   Temp 97.8 °F (36.6 °C) (Oral)   Resp 16   Ht 5' 3\" (1.6 m)   Wt 119 lb (54 kg)   SpO2 100%   BMI 21.08 kg/m²     General appearance: No apparent distress, appears stated age and cooperative. HEENT: Pupils equal, roundConjunctivae/corneas clear. Neck: Supple, with full range of motion. No jugular venous distention. Trachea midline. Respiratory:  Normal respiratory effort.  Clear to auscultation, bilaterally without Rales/Wheezes/Rhonchi. Cardiovascular: Regular rate and rhythm with normal S1/S2 without murmurs, rubs or gallops. Abdomen: Soft, non-tender, non-distended with normal bowel sounds. Musculoskeletal: No clubbing, cyanosis or edema bilaterally. Skin: Warm and dry  Neurologic: Alert, speech clear with no overt facial droop  Psychiatric: Alert and oriented, not anxious appearing    Labs:   Recent Labs     12/10/22  1606 12/11/22  0445   WBC 4.8 4.4   HGB 10.7* 10.1*   HCT 32.5* 30.9*    151     Recent Labs     12/10/22  1606 12/11/22  0445    138   K 3.9 4.3    104   CO2 27 27   BUN 13 11   CREATININE 0.9 1.1   CALCIUM 9.3 8.7     Recent Labs     12/10/22  1606   AST 45*   ALT 77*   BILITOT <0.2   ALKPHOS 191*     Recent Labs     12/11/22  0445   INR 1.07     Recent Labs     12/10/22  2239 12/11/22  0100 12/11/22  0445   TROPONINI <0.01 <0.01 <0.01       Urinalysis:      Lab Results   Component Value Date/Time    NITRU Negative 12/10/2022 01:42 PM    WBCUA 3-5 12/10/2022 01:42 PM    BACTERIA 2+ 12/10/2022 01:42 PM    RBCUA 5-10 12/10/2022 01:42 PM    BLOODU MODERATE 12/10/2022 01:42 PM    SPECGRAV >=1.030 12/10/2022 01:42 PM    GLUCOSEU >=1000 12/10/2022 01:42 PM    GLUCOSEU >=1000 mg/dL 06/07/2010 03:38 PM       Radiology:  CT CHEST PULMONARY EMBOLISM W CONTRAST   Final Result   1. No pulmonary embolism identified. 2.  Apparent linear filling defect within the mid descending thoracic aorta   is thought to be artifactual, rather than due to intimal tear/focal   dissection. If there is clinical concern for acute aortic dissection, CTA   chest dissection protocol would be recommended for further evaluation. 3.  Abnormal extensive mixed sclerosis/lucency throughout the spine. Differential considerations include myeloma versus metastases. Clinical   correlation recommended. 4.  Additional nonemergent findings, as above.          XR CHEST PORTABLE   Final Result   No acute process. IP CONSULT TO CARDIOTHORACIC SURGERY  IP CONSULT TO HOSPITALIST  IP CONSULT TO HEART FAILURE NURSE/COORDINATOR  IP CONSULT TO DIETITIAN    Assessment/Plan:    Active Hospital Problems    Diagnosis     Chronic anemia [D64.9]      Priority: High    Dyspnea [R06.00]      Priority: Medium    Hyperglycemia due to type 2 diabetes mellitus (HCC) [E11.65]     Hypertension, uncontrolled [I10]      Generalized Weakness with body aches  - rapid flu and covid tests negative   - check CK, TSH. PT/ OT consult     Abnormal CT:   - CTPA showed possible aortic irregularity. CT surgery consulted in ER  and thought it is likely liner artifact in mid dissecting aorta with aortic dissection ruled out. No further eval needed. Hyperglycemia/Type 2 DM  - Last A1c 9/2022 > 12  -Hypoglycemic last night. Blood glucose elevated today in 400-500's. Got 22 units of Lantus this AM.  On low dose SSI which was switched to high dose. On Victoza daily at home but not available here per pharmacist -family to bring home supply if possible. Increase Lantus to 30 units  daily for now and to give 16 units Humalog per sliding scale  and repeat blood sugar. Check stat BMP to ensure not in DKA. Monitor and adjust insulin doses as needed  -Continue to hold Actos and Amaryl     Hypertension  - Continue home antihypertensives     CAD  - No active CP  - EKG unchanged, troponin negative  - Continue DAPT     Chronic Anemia  - Continue Fe supplementation      Hypomagnesemia: Mag 1.7, replace IV           DVT Prophylaxis: Lovenox      Diet: ADULT DIET; Regular; 4 carb choices (60 gm/meal); Low Fat/Low Chol/High Fiber/2 gm Na  Code Status: Full Code  PT/OT Eval Status: Ordered    Dispo -pending clinical course, 1-3 days.   Discussed with RN plan of care    Appropriate for A1 Discharge Unit: No      Henrik Holcomb MD

## 2022-12-11 NOTE — PROGRESS NOTES
CVTS    Paged regarding possible aortic dissection. Discussed with family med resident in ED. 61 yo female, aches, chest pain. CT abd, actually chest, concern for ao dissection, location not specified. Inquired about bp, in the 150s, suggested esmolol or cardene to achieve sbp<120. Cr 0.9. Repeat study with contrast timed for dissection protocol. I have since reviewed the images and agree with the radiologist's interpretation - linear artifact in mid-descending aorta. I do not see any other indication of dissection - periao hematoma, effusion, etc.  sbp on admission actually 179. No need for further CT surgery input. Spoke to hospitalist admitting the pt who already has appropriate plan in place.     Abdoulaye Reed MD  12/10/2022  10:21 PM

## 2022-12-11 NOTE — H&P
Hospital Medicine History & Physical      PCP: Mayela Magallanes    Date of Admission: 12/10/2022    Time of Service: 2140    Date of Service: Pt seen/examined on 12/10/2022 and placed in observation. Historian: Self, ED documentation    Chief Concern:    Chief Complaint   Patient presents with    Generalized Body Aches     Pt to ED via EMS from home with generalized body aches since yesterday. Pt denies cough, reports SOB and chills       History Of Present Illness:      Tita Owusu is a 79-year-old female with significant past medical history of hypertension, hyperlipidemia, chronic kidney disease, and CAD who presents to [facility] emergency department with a constellation of vague symptoms such as generalized body pains and chronic upper back pain. She is unable to define an onset of these pains, she states back pain has been for many years, and the body aches have been coming and going for \"long time\". She notes activity intolerance but otherwise, no newly developed symptoms such as shortness of breath, chest pain, palpitations, lower extremity swelling, fevers, or cough. Her evaluation here included laboratory studies, EKG, CT PE protocol, and chest x-ray. Though the CT PE protocol was negative for any acute pulmonary emboli, it did show a linear filling defect within the mid descending thoracic aorta that appeared to be artifactual versus true finding. Pertinent abnormal lab values include blood sugar 213, alkaline phosphatase 191, ALT 77, AST 45, hemoglobin 10.7. D-dimer was also elevated at 1.36.  CT surgeon was consulted due to CT findings, did not agree that it was consistent with aortic dissection. Hospital team was consulted to place patient observation for generalized weakness and body pain.     Past Medical History:          Diagnosis Date    Abnormal brain MRI 7/20/2017    Partially empty sella and minimal chronic small vessel ischemic disease    Acute bilateral low back pain without sciatica 11/2/2016    SHARRON (acute kidney injury) ([facility] [address] 7/5/2017    Arthritis     back    Bipolar disorder ([facility] [address] 10/18/2008    CAD (coronary artery disease)     stent placed 6/8/20    Cancer [facility]) 2015    bilateral breast:s/p lumpectomy/radiation:under care care of breast specialist:Dr. Boone     Carotid stenosis, bilateral:<50%:per US 7/2016 7/15/2016    Carpal tunnel syndrome 10/18/2008    Cervical cancer screening 2014    Nml per pt'. Coronary artery disease of native artery of native heart with stable angina pectoris ([facility] [address] 6/9/2020    DDD (degenerative disc disease), lumbar 7/18/2018    Depression     under care of pschiatrist:Dr. 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 myelopathy or radiculopathy 3/10/2017    Transient cerebral ischemia 07/15/2016    TIA:7/10/16    Unspecified cerebral artery occlusion with cerebral infarction     TIA     Past Surgical History:          Procedure Laterality Date    BREAST LUMPECTOMY  2015    Bilateral:breast cancer    CARDIAC CATHETERIZATION  06/08/2020    Dr. Raphael Segura), DAYANA to Diag 1    COLONOSCOPY N/A 2/1/2021    COLONOSCOPY DIAGNOSTIC performed by Pk Erickson MD at [address]  2/3/2021    CT BONE MARROW BIOPSY 2/3/2021 Lorie Franco MD [address] SCAN    HYSTERECTOMY (CERVIX STATUS UNKNOWN)      Benign:no cervical cancer per pt'    KIDNEY REMOVAL      right    OTHER SURGICAL HISTORY Right     orif right ankle    TEMPORAL ARTERY BIOPSY Right 8/9/2021    RIGHT TEMPORAL ARTERY BIOPSY LIGATION performed by Chino Santo MD at [facility] N/A 1/29/2021    EGD BIOPSY performed by Yasmany Carter MD at [address] [place] [address]     Medications Prior to Admission:      Prior to Admission medications    Medication Sig Start Date End Date Taking? Authorizing Provider   Liraglutide (VICTOZA) 18 MG/3ML SOPN SC injection INJECT 1.8 MG UNDER THE SKIN ONCE DAILY 10/27/22   Tanika Florence MD   insulin lispro, 1 Unit Dial, (HUMALOG/ADMELOG) 100 UNIT/ML SOPN With meals                   - at bedtime    < 150 ---   5 units            0 units  151-200 -- 6 units            0 units  201-250 :  7 units            1 units  251-300:   8 units            2 units  301-350:   9 units            3 units  >350 :       10 units          4 units  Upto 30 units/day 10/27/22   Tanika Florence MD   glimepiride (AMARYL) 4 MG tablet One tab daily with breakfast 10/27/22   Tanika Florence MD   insulin glargine (LANTUS SOLOSTAR) 100 UNIT/ML injection pen Inject 22 Units into the skin every morning  Patient taking differently: Inject 26 Units into the skin every morning 10/27/22   Tanika Florence MD   pioglitazone (ACTOS) 30 MG tablet Take 30 mg by mouth daily 9/7/22   Historical Provider, MD   lisinopril (PRINIVIL;ZESTRIL) 40 MG tablet TAKE 1 TABLET BY MOUTH EVERY DAY 7/22/22   Tanika Florence MD   atorvastatin (LIPITOR) 20 MG tablet TAKE 1 TABLET BY MOUTH EVERY DAY 7/22/22   Tanika Florence MD   blood glucose test strips (TRUE METRIX BLOOD GLUCOSE TEST) strip As needed.   Patient taking differently: 3 each daily 7/22/22   Tanika Florence MD   Insulin Pen Needle 32G X 4 MM MISC 1 each by Does not apply route daily 7/22/22   [facility] Dan MD   Blood Glucose Monitoring Suppl (TRUE METRIX METER) w/Device KIT Used to  check blood sugar 3 times eyad 7/22/22   Dylan Upton MD   ticagrelor (BRILINTA) 90 MG TABS tablet TAKE 1 TABLET BY MOUTH TWICE A DAY 4/18/22   Lisa Adkins MD   nitroGLYCERIN (NITROSTAT) 0.4 MG SL tablet up to max of 3 total doses. If no relief after 1 dose, call 911. 9/29/21   Mandi Disla MD   gabapentin (NEURONTIN) 600 MG tablet Take 0.5 tablets by mouth 2 times daily for 3 days. Patient taking differently: Take 600 mg by mouth 3 times daily. 6/4/21 11/22/21  Ag Sweeney MD   paliperidone (INVEGA) 9 MG extended release tablet Take 9 mg by mouth every morning 3/15/21   Historical Provider, MD   pantoprazole (PROTONIX) 40 MG tablet Take 40 mg by mouth daily  4/3/21   Historical Provider, MD   ferrous sulfate (IRON 325) 325 (65 Fe) MG tablet Take 325 mg by mouth daily (with breakfast)    Historical Provider, MD   oxyCODONE-acetaminophen (PERCOCET) 7.5-325 MG per tablet TAKE 1 TABLET BY MOUTH EVERY 6 (SIX) HOURS AS NEEDED FOR UP TO 28 DAYS. 3/11/21   Historical Provider, MD   clonazePAM (KLONOPIN) 0.5 MG tablet Take 0.5 mg by mouth 3 times daily as needed. 3/15/21   Historical Provider, MD   calcium carbonate-vitamin D (CALTRATE) 600-400 MG-UNIT TABS per tab Take 1 tablet by mouth daily  12/30/19   Historical Provider, MD   aspirin 81 MG tablet Take 81 mg by mouth daily    Historical Provider, MD   traZODone (DESYREL) 100 MG tablet Take 200 mg by mouth nightly     Historical Provider, MD       Allergies:  Morphine, Penicillins, Codeine, and Penicillin g    Social History:      The patient currently lives at home. TOBACCO:   reports that she quit smoking about 8 years ago. Her smoking use included cigarettes and cigars. She has a 10.00 pack-year smoking history. She has never used smokeless tobacco.  ETOH:   reports no history of alcohol use.   E-cigarette/Vaping       Questions Responses E-cigarette/Vaping Use Never User    Start Date     Passive Exposure     Quit Date     Counseling Given     Comments           Family History:      Reviewed in detail at bedside with patient; positive as follows:        Problem Relation Age of Onset    Cancer Mother         breast    Cancer Father     Heart Failure Neg Hx     High Cholesterol Neg Hx     Hypertension Neg Hx     Migraines Neg Hx     Rashes/Skin Problems Neg Hx     Seizures Neg Hx     Stroke Neg Hx     Thyroid Disease Neg Hx     Diabetes Neg Hx        REVIEW OF SYSTEMS COMPLETED:   Pertinent positives as noted in the HPI. All other systems reviewed and negative. PHYSICAL EXAM PERFORMED:    BP (!) 151/68   Pulse 54   Temp 98.6 °F (37 °C) (Oral)   Resp 16   Ht 5' 3\" (1.6 m)   Wt 119 lb (54 kg)   SpO2 99%   BMI 21.08 kg/m²     General appearance:  No apparent distress, appears stated age and cooperative. HEENT:  Normal cephalic, atraumatic without obvious deformity. Pupils equal, round, and reactive to light. Extra ocular muscles intact. Conjunctivae/corneas clear. Neck: Supple, with full range of motion. No jugular venous distention. Trachea midline. Respiratory:  Normal respiratory effort. Clear to auscultation, bilaterally without Rales/Wheezes/Rhonchi. Cardiovascular:  Regular rate and rhythm with normal S1/S2 without murmurs, rubs or gallops. Abdomen: Soft, non-tender, non-distended with normal bowel sounds. Musculoskeletal:  No clubbing, cyanosis or edema bilaterally. Full range of motion without deformity. Skin: Skin color, texture, turgor normal.  No rashes or lesions. Neurologic:  Neurovascularly intact without any focal sensory/motor deficits.  Cranial nerves: II-XII intact, grossly non-focal.  Psychiatric:  Alert and oriented, thought content appropriate, normal insight  Capillary Refill: Brisk,3 seconds, normal  Peripheral Pulses: +2 palpable, equal bilaterally in all extremities       Labs:     Recent Labs 12/10/22  1606   WBC 4.8   HGB 10.7*   HCT 32.5*        Recent Labs     12/10/22  1606      K 3.9      CO2 27   BUN 13   CREATININE 0.9   CALCIUM 9.3     Recent Labs     12/10/22  1606   AST 45*   ALT 77*   BILITOT <0.2   ALKPHOS 191*     No results for input(s): INR in the last 72 hours. Recent Labs     12/10/22  1606 12/10/22  2100 12/10/22  2239   TROPONINI <0.01 <0.01 <0.01       Urinalysis:      Lab Results   Component Value Date/Time    NITRU Negative 12/10/2022 01:42 PM    WBCUA 3-5 12/10/2022 01:42 PM    BACTERIA 2+ 12/10/2022 01:42 PM    RBCUA 5-10 12/10/2022 01:42 PM    BLOODU MODERATE 12/10/2022 01:42 PM    SPECGRAV >=1.030 12/10/2022 01:42 PM    GLUCOSEU >=1000 12/10/2022 01:42 PM    GLUCOSEU >=1000 mg/dL 06/07/2010 03:38 PM     Radiology:     I have reviewed the radiographic results for this encounter with the following interpretation(s); see report(s) below:    CT CHEST PULMONARY EMBOLISM W CONTRAST   Final Result   1. No pulmonary embolism identified. 2.  Apparent linear filling defect within the mid descending thoracic aorta   is thought to be artifactual, rather than due to intimal tear/focal   dissection. If there is clinical concern for acute aortic dissection, CTA   chest dissection protocol would be recommended for further evaluation. 3.  Abnormal extensive mixed sclerosis/lucency throughout the spine. Differential considerations include myeloma versus metastases. Clinical   correlation recommended. 4.  Additional nonemergent findings, as above. XR CHEST PORTABLE   Final Result   No acute process.          CTA CHEST ABDOMEN PELVIS W CONTRAST    (Results Pending)       EKG:  I have reviewed the EKG with the following interpretation in the absence of a cardiologist: SB, 51 bpm; no acute ST-segment or T-wave deviations otherwise; non-acute EKG    Consults:    IP CONSULT TO CARDIOTHORACIC SURGERY  IP CONSULT TO HOSPITALIST  IP CONSULT TO HEART FAILURE NURSE/COORDINATOR  IP CONSULT TO DIETITIAN    ASSESSMENT:    Active Hospital Problems    Diagnosis Date Noted    Dyspnea [R06.00] 12/10/2022     Priority: Medium    Chronic anemia [D64.9] 02/05/2021     Priority: Medium    Hyperglycemia due to type 2 diabetes mellitus ([facility] [address] [E11.65] 10/07/2020     Priority: Medium    Hypertension, uncontrolled [I10] 06/17/2016     Priority: Medium     PLAN:    Generalized Weakness  - Place in observation  - CTPA should possible aortic irregularity; CT surgery does not agree    Hyperglycemia/Type 2 DM  - Last A1c 9/2022 > 12  - Continue insulin therapies, Victoza  - Hold Actos and Amaryl    Hypertension  - Continue home antihypertensives    CAD  - No active CP  - EKG unchanged, troponin negative  - Continue DAPT    Chronic Anemia  - Continue Fe supplementation      DVT Prophylaxis: Lovenox PPx  SRMB Prophylaxis: Protonix  Diet: ADULT DIET; Regular; 4 carb choices (60 gm/meal); Low Fat/Low Chol/High Fiber/2 gm Na  Code Status: Full Code    PT/OT Eval Status: N/A    Dispo - 1-2 days per clinical improvement    Jeffery Noyola, APRN - CNP    Thank you Moreno Sheth for the opportunity to be involved in this patient's care.  If you have any questions or concerns please feel free to contact me at [phone].---Anticipated co-signer, Dr. Shama Hidalgo    (Please note that portions of this note were completed with a voice recognition program. Efforts were made to edit the dictations but occasionally words are mis-transcribed.)

## 2022-12-11 NOTE — CONSULTS
Anesthesia Pre Eval Note    Anesthesia ROS/Med Hx        Anesthetic Complication History:  Patient does not have a history of anesthetic complications      Pulmonary Review:    The patient is a current smoker.     Neuro/Psych Review:  Patient does not have a neuro/psych history       Cardiovascular Review:  Patient does not have a cardiovascular history       GI/HEPATIC/RENAL Review:  Patient does not have a GI/hepatic/renalhistory       End/Other Review:    Positive for obesity class I - 30.00 - 34.99    Overall Review of Systems Comments:    Osgood-Schlatter's disease, right                             Right knee pain                                               Obesity (BMI 30.0-34.9)                                       Additional Results:     ALLERGIES:  No Known Allergies       Lab Results       Component                Value               Date                       WBC                      5.6                 01/20/2021                 RBC                      4.76                01/20/2021                 HGB                      15.0                01/20/2021                 HCT                      43.3                01/20/2021                 MCHC                     34.6                01/20/2021                 SODIUM                   138                 01/20/2021                 POTASSIUM                4.2                 01/20/2021                 CHLORIDE                 104                 01/20/2021                 CO2                      28                  01/20/2021                 GLUCOSE                  97                  01/20/2021                 BUN                      21 (H)              01/20/2021                 CREATININE               0.88                01/20/2021                 GFRESTIMATE              >90                 01/20/2021                 CALCIUM                  9.2                 01/20/2021                 PLT                      163                  Dr. Prakash Briceno reached out to Dr. Jostin Reza for cardiothoracic consult.    Dr. Jostin Reza called back @ [phone] 01/20/2021             Past Medical History:  No date: Obesity (BMI 30.0-34.9)  No date: Osgood-Schlatter's disease, right  No date: Right knee pain    History reviewed. No pertinent surgical history.       Prior to Admission medications :  Medication chlorhexidine (Hibiclens) 4 % topical liquid, Sig Follow directions on letter provided by physicians office, Start Date 4/6/21, End Date , Taking? Yes, Authorizing Provider La Alexander NP    Medication HYDROcodone-acetaminophen (NORCO) 5-325 MG per tablet, Sig 1-2 tablets orally; every 4-6 hours as needed for pain; Maximum 10 tablets per day, Start Date 4/6/21, End Date , Taking? , Authorizing Provider La Alexander NP            Relevant Problems   No relevant active problems       Physical Exam     Airway   Mallampati: II  TM Distance: >3 FB  Neck ROM: Full  Neck: Non-tender and Able to place in sniff position  TMJ Mobility: Good    Cardiovascular  Cardiovascular exam normal  Cardio Rhythm: Regular  Cardio Rate: Normal    Head Assessment  Head assessment: Normocephalic    General Assessment  General Assessment: Alert and oriented and No acute distress    Dental Exam  Dental exam normal    Pulmonary Exam  Pulmonary exam normal  Breath sounds clear to auscultation:  Yes    Abdominal Exam  Abdominal exam normal  Patient Demonstrates:  Obese      Anesthesia Plan:  Anesthesia Plan    ASA Status: 2    Anesthesia Type: General    Induction: Intravenous  Preferred Airway Type: LMA  Maintenance: Inhalational      Post-op Pain Management: Per Surgeon and Single Shot Block      Checklist  Reviewed: Nursing Notes, Consultations, Patient Summary, Medications, Allergies, NPO Status, DNR Status, Care Everywhere, Outside Records, Beta Blocker Status, Problem list and Past Med History  Consent/Risks Discussed Statement:  The proposed anesthetic plan, including its risks and benefits, have been discussed with the Patient along with the risks and benefits of alternatives.  Questions were encouraged and answered and the patient and/or representative understands and agrees to proceed.    I have discussed elements of the patient's history or examination, as noted above and/or as follows, that place the patient at higher risk of complications; smoking.    I discussed with the patient (and/or patient's legal representative) the risks and benefits of the proposed anesthesia plan, General, which may include services performed by other anesthesia providers.    Alternative anesthesia plans, if available, were reviewed with the patient (and/or patient's legal representative). Discussion has been held with the patient (and/or patient's legal representative) regarding risks of anesthesia, which include Nausea, Vomiting, Sore Throat, Dental Injury, Allergic Reaction, Hypotension and Intra-operative Awareness and emergent situations that may require change in anesthesia plan.    The patient (and/or patient's legal representative) has indicated understanding, his/her questions have been answered, and he/she wishes to proceed with the planned anesthetic.  Blood Consent    Blood Products: Not Anticipated    Comments  Plan Comments: Discussed with Alexander Vásquez the risks, benefits and options of general anesthesia for the procedure, as well as utilizing a(n) adductor canal nerve block for post-operative pain relief.  Questions were answered and Alexander wishes to proceed with the proposed plan.

## 2022-12-11 NOTE — PROGRESS NOTES
Pt a/o. Pt medicated per MAR with PO pain medication for chronic back pain and pt stated that it helped. Pt stated no nausea; no emesis. Writer informed pt that [facility] does not carry Roise Nares and asked pt if somebody could bring the medication to the hospital. Pt stated she \"will work on it. \" Writer informed pharmacy and Dr. Leana Smith. POC Glucose 400s/500s and writer notified Dr. Leana Smith. See new orders. Writer gave pt 16 units per MAR and POC glucose back down to 254. Pt is weak and unsteady. Assist x1 with walker. Pt up to chair for a few hours this morning. Bed locked in lowest position; side rails 2/4; call light and bedside table within reach of pt.

## 2022-12-12 ENCOUNTER — APPOINTMENT (OUTPATIENT)
Dept: NUCLEAR MEDICINE | Age: 62
DRG: 092 | End: 2022-12-12
Payer: COMMERCIAL

## 2022-12-12 LAB
GLUCOSE BLD-MCNC: 112 MG/DL (ref 70–99)
GLUCOSE BLD-MCNC: 228 MG/DL (ref 70–99)
GLUCOSE BLD-MCNC: 239 MG/DL (ref 70–99)
GLUCOSE BLD-MCNC: 334 MG/DL (ref 70–99)
MAGNESIUM: 2 MG/DL (ref 1.8–2.4)
PERFORMED ON: ABNORMAL

## 2022-12-12 PROCEDURE — 6370000000 HC RX 637 (ALT 250 FOR IP): Performed by: INTERNAL MEDICINE

## 2022-12-12 PROCEDURE — 6360000002 HC RX W HCPCS: Performed by: NURSE PRACTITIONER

## 2022-12-12 PROCEDURE — 2580000003 HC RX 258: Performed by: NURSE PRACTITIONER

## 2022-12-12 PROCEDURE — 78306 BONE IMAGING WHOLE BODY: CPT | Performed by: INTERNAL MEDICINE

## 2022-12-12 PROCEDURE — A9503 TC99M MEDRONATE: HCPCS | Performed by: INTERNAL MEDICINE

## 2022-12-12 PROCEDURE — 83735 ASSAY OF MAGNESIUM: CPT

## 2022-12-12 PROCEDURE — 36415 COLL VENOUS BLD VENIPUNCTURE: CPT

## 2022-12-12 PROCEDURE — G0378 HOSPITAL OBSERVATION PER HR: HCPCS

## 2022-12-12 PROCEDURE — 3430000000 HC RX DIAGNOSTIC RADIOPHARMACEUTICAL: Performed by: INTERNAL MEDICINE

## 2022-12-12 PROCEDURE — 6370000000 HC RX 637 (ALT 250 FOR IP): Performed by: NURSE PRACTITIONER

## 2022-12-12 PROCEDURE — 1200000000 HC SEMI PRIVATE

## 2022-12-12 RX ORDER — TC 99M MEDRONATE 20 MG/10ML
22 INJECTION, POWDER, LYOPHILIZED, FOR SOLUTION INTRAVENOUS
Status: COMPLETED | OUTPATIENT
Start: 2022-12-12 | End: 2022-12-12

## 2022-12-12 RX ADMIN — INSULIN LISPRO 4 UNITS: 100 INJECTION, SOLUTION INTRAVENOUS; SUBCUTANEOUS at 20:33

## 2022-12-12 RX ADMIN — SODIUM CHLORIDE, PRESERVATIVE FREE 10 ML: 5 INJECTION INTRAVENOUS at 08:29

## 2022-12-12 RX ADMIN — INSULIN LISPRO 4 UNITS: 100 INJECTION, SOLUTION INTRAVENOUS; SUBCUTANEOUS at 11:44

## 2022-12-12 RX ADMIN — OXYCODONE AND ACETAMINOPHEN 1 TABLET: 7.5; 325 TABLET ORAL at 18:44

## 2022-12-12 RX ADMIN — LISINOPRIL 40 MG: 20 TABLET ORAL at 08:28

## 2022-12-12 RX ADMIN — TC 99M MEDRONATE 22 MILLICURIE: 20 INJECTION, POWDER, LYOPHILIZED, FOR SOLUTION INTRAVENOUS at 10:00

## 2022-12-12 RX ADMIN — INSULIN LISPRO 4 UNITS: 100 INJECTION, SOLUTION INTRAVENOUS; SUBCUTANEOUS at 08:29

## 2022-12-12 RX ADMIN — PALIPERIDONE 9 MG: 3 TABLET, EXTENDED RELEASE ORAL at 08:28

## 2022-12-12 RX ADMIN — TRAZODONE HYDROCHLORIDE 200 MG: 50 TABLET ORAL at 20:30

## 2022-12-12 RX ADMIN — FERROUS SULFATE TAB 325 MG (65 MG ELEMENTAL FE) 325 MG: 325 (65 FE) TAB at 08:28

## 2022-12-12 RX ADMIN — TICAGRELOR 90 MG: 90 TABLET ORAL at 20:30

## 2022-12-12 RX ADMIN — PANTOPRAZOLE SODIUM 40 MG: 40 TABLET, DELAYED RELEASE ORAL at 08:28

## 2022-12-12 RX ADMIN — ATORVASTATIN CALCIUM 20 MG: 10 TABLET, FILM COATED ORAL at 08:28

## 2022-12-12 RX ADMIN — TICAGRELOR 90 MG: 90 TABLET ORAL at 08:28

## 2022-12-12 RX ADMIN — CLONAZEPAM 0.5 MG: 0.5 TABLET ORAL at 14:10

## 2022-12-12 RX ADMIN — ENOXAPARIN SODIUM 40 MG: 100 INJECTION SUBCUTANEOUS at 08:28

## 2022-12-12 RX ADMIN — INSULIN GLARGINE 30 UNITS: 100 INJECTION, SOLUTION SUBCUTANEOUS at 08:29

## 2022-12-12 RX ADMIN — OXYCODONE AND ACETAMINOPHEN 1 TABLET: 7.5; 325 TABLET ORAL at 08:34

## 2022-12-12 RX ADMIN — ASPIRIN 81 MG: 81 TABLET, COATED ORAL at 08:28

## 2022-12-12 ASSESSMENT — PAIN SCALES - GENERAL
PAINLEVEL_OUTOF10: 9

## 2022-12-12 ASSESSMENT — PAIN DESCRIPTION - DESCRIPTORS: DESCRIPTORS: SHARP

## 2022-12-12 ASSESSMENT — PAIN DESCRIPTION - LOCATION: LOCATION: BACK

## 2022-12-12 NOTE — CONSULTS
[facility]    Consult Note      Requesting Physician:  Dr. Soni Lomeli:  history of breast cancer      HISTORY OF PRESENT ILLNESS:    Ms. Loi Gracia  is a 60 y.o. female we are seeing in consultation for a history of breast cancer. She is admitted with weight loss and back pain. CT of the chest showed abnormal extensive mixed sclerotic and lucent lesions throughout the spine. She is supposed to be taking Femara for a history of breast cancer, but states she has not done this for some time. She has been relatively noncompliant with her breast cancer treatment and follow-up. Also, there is an apparent linear filling defect of the mid descending thoracic aorta. CT surgery was consulted who did not feel this was an aortic dissection. ICD-10-CM    1. Abnormal CT of the chest  R93.89       2. Bilateral calf pain  M79.661     M79.662              Past Medical History:  Past Medical History:   Diagnosis Date    Abnormal brain MRI 7/20/2017    Partially empty sella and minimal chronic small vessel ischemic disease    Acute bilateral low back pain without sciatica 11/2/2016    SHARRON (acute kidney injury) ([address] 7/5/2017    Arthritis     back    Bipolar disorder ([address] 10/18/2008    CAD (coronary artery disease)     stent placed 6/8/20    Cancer ([address] 2015    bilateral breast:s/p lumpectomy/radiation:under care care of breast specialist:Dr. Boone     Carotid stenosis, bilateral:<50%:per US 7/2016 7/15/2016    Carpal tunnel syndrome 10/18/2008    Cervical cancer screening 2014    Nml per pt'.     Coronary artery disease of native artery of native heart with stable angina pectoris ([address] 6/9/2020    DDD (degenerative disc disease), lumbar 7/18/2018    Depression     under care of pschiatrist:Dr. Guillermo Shen    Depression/anxiety 7/5/2017    Depression/anxiety     Diabetes mellitus ([address] Gout     History of mammogram 10/28/2016;8/14/17    Negative    History of therapeutic radiation     Hyperlipidemia     Hypertension     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 stage 3 chronic kidney disease ([address] 9/17/2017    Microalbuminuria 7/1/2016    Neuropathy in diabetes [facility])     Non morbid obesity 7/1/2016    Pancreatitis 5/12/16    [facility] hospitalization 5/12/16-5/16/16:under care of GI:chronic pancreatitis    S/P endoscopy 6/14/2016    [place]:per pt' & her family member was nml.     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 myelopathy or radiculopathy 3/10/2017    Transient cerebral ischemia 07/15/2016    TIA:7/10/16    Unspecified cerebral artery occlusion with cerebral infarction     TIA       Past Surgical History:  Past Surgical History:   Procedure Laterality Date    BREAST LUMPECTOMY  2015    Bilateral:breast cancer    CARDIAC CATHETERIZATION  06/08/2020    Dr. Ana Maria Vasquez), DAYANA to Diag 1    COLONOSCOPY N/A 2/1/2021    COLONOSCOPY DIAGNOSTIC performed by Rosalee Cheng MD at [address]  2/3/2021    CT BONE MARROW BIOPSY 2/3/2021 Ildefonso Shen MD [facility] CT SCAN    HYSTERECTOMY (CERVIX STATUS UNKNOWN)      Benign:no cervical cancer per pt'    KIDNEY REMOVAL      right    OTHER SURGICAL HISTORY Right     orif right ankle    TEMPORAL ARTERY BIOPSY Right 8/9/2021    RIGHT TEMPORAL ARTERY BIOPSY LIGATION performed by Chary Alexandra MD at [facility] N/A 1/29/2021    EGD BIOPSY performed by Rosalee Cheng MD at [address] [place] [address]       Current Medications:  Current Facility-Administered Medications   Medication Dose Route Frequency Provider Last Rate Last Admin    insulin lispro (HUMALOG) injection vial 0-4 Units  0-4 Units SubCUTAneous Nightly David Vickers MD        insulin lispro (HUMALOG) injection vial 0-16 Units  0-16 Units SubCUTAneous TID  David MD Alee   4 Units at 12/12/22 0829    insulin glargine (LANTUS) injection vial 30 Units  30 Units SubCUTAneous QAM Franciscamila MD Alee   30 Units at 12/12/22 0829    acetaminophen (TYLENOL) tablet 650 mg  650 mg Oral Q4H PRN Javid Olivares MD   650 mg at 12/10/22 1529    iopamidol (ISOVUE-370) 76 % injection 75 mL  75 mL IntraVENous ONCE PRN Javid Olivares MD        aspirin EC tablet 81 mg  81 mg Oral Daily SARTHAK Vann CNP   81 mg at 12/12/22 0838    atorvastatin (LIPITOR) tablet 20 mg  20 mg Oral Daily SARTHAK Vann CNP   20 mg at 12/12/22 0970    clonazePAM (KLONOPIN) tablet 0.5 mg  0.5 mg Oral TID PRN SARTHAK Vann CNP   0.5 mg at 12/11/22 1409    ferrous sulfate (IRON 325) tablet 325 mg  325 mg Oral Daily with breakfast SARTHAK Vann CNP   325 mg at 12/12/22 9444    Liraglutide (VICTOZA) SC injection 1.8 mg  1.8 mg SubCUTAneous Daily SARTHAK Vann CNP        lisinopril (PRINIVIL;ZESTRIL) tablet 40 mg  40 mg Oral Daily SARTHAK Vann CNP   40 mg at 12/12/22 0828    oxyCODONE-acetaminophen (PERCOCET) 7.5-325 MG per tablet 1 tablet  1 tablet Oral Q6H PRN SARTHAK Vann CNP   1 tablet at 12/12/22 0834    paliperidone (INVEGA) extended release tablet 9 mg  9 mg Oral QAM SARTHAK Vann CNP   9 mg at 12/12/22 0828    pantoprazole (PROTONIX) tablet 40 mg  40 mg Oral Daily SARTHAK Vann CNP   40 mg at 12/12/22 3280    ticagrelor (BRILINTA) tablet 90 mg  90 mg Oral BID SARTHAK Vann CNP   90 mg at 12/12/22 4423    traZODone (DESYREL) tablet 200 mg  200 mg Oral Nightly SARTHAK Vann CNP   200 mg at 12/11/22 1942    sodium chloride flush 0.9 % injection 5-40 mL  5-40 mL IntraVENous 2 times per day SARTHAK Vann CNP   10 mL at 12/12/22 0829    sodium chloride flush 0.9 % injection 5-40 mL  5-40 mL IntraVENous PRN SARTHAK Vann CNP        0.9 % sodium chloride infusion   IntraVENous PRN Huizar Bumpsonny, APRN - CNP        enoxaparin (LOVENOX) injection 40 mg  40 mg SubCUTAneous Daily Huizar Bumpers, APRN - CNP   40 mg at 22 0828    ondansetron (ZOFRAN-ODT) disintegrating tablet 4 mg  4 mg Oral Q8H PRN Huizar Bumpers, APRN - CNP        Or    ondansetron (ZOFRAN) injection 4 mg  4 mg IntraVENous Q6H PRN Huizar Bumpers, APRN - CNP        polyethylene glycol (GLYCOLAX) packet 17 g  17 g Oral Daily PRN Huizar Bumpers, APRN - CNP        acetaminophen (TYLENOL) tablet 650 mg  650 mg Oral Q6H PRN Huizar Bumpers, APRN - CNP        Or    acetaminophen (TYLENOL) suppository 650 mg  650 mg Rectal Q6H PRN Huizar Bumpers, APRN - CNP        glucose chewable tablet 16 g  4 tablet Oral PRN Huizar Bumpers, APRN - CNP        dextrose bolus 10% 125 mL  125 mL IntraVENous PRN Huizar Bumpers, APRN - CNP        Or    dextrose bolus 10% 250 mL  250 mL IntraVENous PRN Huizar Bumpers, APRN - CNP        glucagon (rDNA) injection 1 mg  1 mg SubCUTAneous PRN Huizar Bumpers, APRN - CNP        dextrose 10 % infusion   IntraVENous Continuous PRN Huizar Bumpers, APRN - CNP           Allergies:   Allergies   Allergen Reactions    Morphine Anaphylaxis and Hives     feels like throat is closing    Penicillins Hives and Swelling    Codeine Hives and Rash    Penicillin G Rash       Social History:  Social History     Socioeconomic History    Marital status:      Spouse name: Not on file    Number of children: 1    Years of education: Not on file    Highest education level: Not on file   Occupational History    Occupation:    Tobacco Use    Smoking status: Former     Packs/day: 0.50     Years: 20.00     Pack years: 10.00     Types: Cigarettes, Cigars     Quit date: 7/3/2014     Years since quittin.4    Smokeless tobacco: Never   Vaping Use    Vaping Use: Never used   Substance and Sexual Activity    Alcohol use: No     Alcohol/week: 0.0 standard drinks    Drug use: No    Sexual activity: Not on file Other Topics Concern    Not on file   Social History Narrative    Not on file     Social Determinants of Health     Financial Resource Strain: Not on file   Food Insecurity: Not on file   Transportation Needs: Not on file   Physical Activity: Not on file   Stress: Not on file   Social Connections: Not on file   Intimate Partner Violence: Not on file   Housing Stability: Not on file          Family History:  Family History   Problem Relation Age of Onset    Cancer Mother         breast    Cancer Father     Heart Failure Neg Hx     High Cholesterol Neg Hx     Hypertension Neg Hx     Migraines Neg Hx     Rashes/Skin Problems Neg Hx     Seizures Neg Hx     Stroke Neg Hx     Thyroid Disease Neg Hx     Diabetes Neg Hx        REVIEW OF SYSTEMS:      Constitutional: Denies fever, sweats, weight loss  Eyes: No visual changes or diplopia. No scleral icterus. Ent: No headaches, hearing loss or vertigo. No mouth sores or sore throat. Cardiovascular: No chest pain, dyspnea on exertion, palpitations or loss of consciousness. Respiratory: No cough or wheezing, no sputum production. No hemoptysis. Gastrointestinal: No abdominal pain, appetite loss, blood in stools. No change in bowel habits. Genitourinary: No dysuria, trouble voiding, or hematuria. Musculoskeletal: No generalized weakness. No joint complaints. Integumentary: No rash or pruritus. Neurological: No headache, diplopia. No change in gait, balance, or coordination. No paresthesias. Endocrine: No temperature intolerance. No excessive thirst, fluid intake, urination. Hematologic/lymphatic: No abnormal bruising or ecchymosis, blood clots or swollen lymph nodes. Allergic/immunologic: No nasal congestion or hives.         PHYSICAL EXAM:      Vitals:  Patient Vitals for the past 24 hrs:   BP Temp Temp src Pulse Resp SpO2 Weight   12/12/22 0826 (!) 177/63 98.3 °F (36.8 °C) Oral 67 16 100 % --   12/12/22 0615 -- -- -- -- -- -- 113 lb (51.3 kg)   12/12/22 0506 (!) 145/68 98.4 °F (36.9 °C) Oral 58 16 97 % --   12/11/22 2332 (!) 152/67 98.6 °F (37 °C) Oral 65 16 98 % --   12/11/22 1937 128/67 99.5 °F (37.5 °C) Oral 63 16 97 % --   12/11/22 1550 (!) 145/77 97.7 °F (36.5 °C) Axillary 60 16 99 % --   12/11/22 1118 (!) 149/69 97.8 °F (36.6 °C) Oral 55 16 98 % --   12/11/22 0921 (!) 148/73 97.8 °F (36.6 °C) Oral 61 16 100 % --       Date 12/12/22 0000 - 12/12/22 2359   Shift 0780-3249 6427-2460 6029-8553 24 Hour Total   INTAKE   P.O.(mL/kg/hr)  240  240   Shift Total(mL/kg)  240(4.7)  240(4.7)   OUTPUT   Urine(mL/kg/hr) 300(0.7) 500  800   Emesis/NG output(mL/kg)  0(0)  0(0)   Other(mL/kg)  0(0)  0(0)   Stool(mL/kg)  0(0)  0(0)   Blood(mL/kg)  0(0)  0(0)   Shift Total(mL/kg) 300(5.9) 500(9.8)  800(15.6)   Weight (kg) 51.3 51.3 51.3 51.3       CONSTITUTIONAL: awake, alert, cooperative, no apparent distress   EYES: pupils equal, round and reactive to light, sclera clear and conjunctiva normal  ENT: Normocephalic, without obvious abnormality, atraumatic  NECK: supple, symmetrical, no jugular venous distension and no carotid bruits   HEMATOLOGIC/LYMPHATIC: no cervical, supraclavicular or axillary lymphadenopathy   LUNGS: no increased work of breathing and clear to auscultation   CARDIOVASCULAR: regular rate and rhythm, normal S1 and S2, no murmur noted  ABDOMEN: normal bowel sounds x 4, soft, non-distended, non-tender, no masses palpated, no hepatosplenomegaly   MUSCULOSKELETAL: full range of motion noted, tone is normal  NEUROLOGIC: awake, alert, oriented to name, place and time. Motor skills grossly intact. SKIN: Normal skin color, texture, turgor and no jaundice.  appears intact   EXTREMITIES: no LE edema       DATA:    PT/INR:    Recent Labs     12/11/22  0445 12/10/22  1606   PROT  --  6.6   INR 1.07  --      PTT:    Recent Labs     12/11/22  0445   APTT 24.8       CMP:    Recent Labs     12/11/22  1508 12/11/22  0445 12/10/22  1606   *   < > 140   K 4.5   < > 3.9   CL 100   < > 104   CO2 27   < > 27   BUN 12   < > 13   PROT  --   --  6.6    < > = values in this interval not displayed. Mg:    Recent Labs     12/12/22  0520 12/11/22  0445   MG 2.00 1.70*       Lab Results   Component Value Date    CALCIUM 8.6 12/11/2022    PHOS 3.8 03/31/2022       CBC:    Recent Labs     12/11/22  0445 12/10/22  1606   WBC 4.4 4.8   NEUTROABS 2.0 2.8   LYMPHOPCT 48.5 34.3   RBC 3.49* 3.66*   HGB 10.1* 10.7*   HCT 30.9* 32.5*   MCV 88.4 88.9   MCH 28.8 29.2   MCHC 32.6 32.9   RDW 12.0* 11.7*    156        LDH:No results for input(s): LDH in the last 720 hours. Radiology Review:  CT CHEST PULMONARY EMBOLISM W CONTRAST  Narrative: EXAMINATION:  CTA OF THE CHEST 12/10/2022 7:24 pm    TECHNIQUE:  CTA of the chest was performed after the administration of intravenous  contrast.  Multiplanar reformatted images are provided for review. MIP  images are provided for review. Automated exposure control, iterative  reconstruction, and/or weight based adjustment of the mA/kV was utilized to  reduce the radiation dose to as low as reasonably achievable. COMPARISON:  12/10/2022 and 02/01/2021    HISTORY:  ORDERING SYSTEM PROVIDED HISTORY: fatigue. bodyaches. elevate d dimer  TECHNOLOGIST PROVIDED HISTORY:  Reason for exam:->fatigue. bodyaches. elevate d dimer  Decision Support Exception - unselect if not a suspected or confirmed  emergency medical condition->Emergency Medical Condition (MA)  Reason for Exam: chest pain, sob and fatigue x few days. FINDINGS:  Pulmonary Arteries: Pulmonary arteries are adequately opacified for  evaluation. No pulmonary embolism identified. Main pulmonary artery is  mildly dilated which may reflect pulmonary arterial hypertension. Mediastinum: Mild nonspecific subcarinal adenopathy. Mild cardiomegaly. No  pericardial effusion. Mild coronary artery calcifications.   Apparent linear  filling defect within the mid descending thoracic aorta is thought to be  related to beam hardening artifact (series 2/image 126). No aneurysm. Lungs/pleura: The lungs are without acute process. Mild paraseptal emphysema  with possible honeycombing within the peripheral right lung base. No focal  consolidation or pulmonary edema. No evidence of pleural effusion or  pneumothorax. Upper Abdomen: Absent right kidney with compensatory hypertrophy of the left  kidney. Chronic pancreatitis. Soft Tissues/Bones: Ill ill-defined mixed sclerosis/lucency throughout the  spine. No acute fracture. Impression: 1. No pulmonary embolism identified. 2.  Apparent linear filling defect within the mid descending thoracic aorta  is thought to be artifactual, rather than due to intimal tear/focal  dissection. If there is clinical concern for acute aortic dissection, CTA  chest dissection protocol would be recommended for further evaluation. 3.  Abnormal extensive mixed sclerosis/lucency throughout the spine. Differential considerations include myeloma versus metastases. Clinical  correlation recommended. 4.  Additional nonemergent findings, as above. XR CHEST PORTABLE  Narrative: EXAMINATION:  ONE XRAY VIEW OF THE CHEST    12/10/2022 2:37 pm    COMPARISON:  06/30/2022    HISTORY:  ORDERING SYSTEM PROVIDED HISTORY: bodyaches  TECHNOLOGIST PROVIDED HISTORY:  Reason for exam:->bodyaches  Reason for Exam: Generalized Body Aches (Pt to ED via EMS from home with  generalized body aches since yesterday. Pt denies cough, reports SOB and  chills)    FINDINGS:  The lungs are without acute focal process. There is no effusion or  pneumothorax. The cardiomediastinal silhouette is stable. The osseous  structures are stable. Impression: No acute process. Problem List  Patient Active Problem List   Diagnosis    Acute hyperglycemia    Hypertension, uncontrolled    Bilateral malignant neoplasm of breast in female ([address]    Microalbuminuria    Obesity (BMI 30-39. 9)    Slurred speech    Hx of ischemic CVA    Brain metastases ([facility]    Carotid stenosis, bilateral:<50%:per US 7/2016    SHARRON (acute kidney injury) ([address]    Lactic acidosis    Frequent falls    Depression/anxiety    Abnormal brain MRI    Acute bilateral low back pain without sciatica    Closed fracture of right ankle, with routine healing, subsequent encounter    Stage 3a chronic kidney disease ([address]    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 ([address]    Dyslipidemia associated with type 2 diabetes mellitus ([address]    Bipolar disorder ([address]    Cardiomegaly    Cardiomyopathy ([address]    Carpal tunnel syndrome    Chronic systolic heart failure (HCC)    Diffuse cystic mastopathy    Edema    Gallstone pancreatitis    Gout    History of breast cancer    History of renal cell carcinoma    Cardiomyopathy in other diseases classified elsewhere    Mononeuritis    Myalgia and myositis    Nonspecific abnormal electrocardiogram (ECG) (EKG)    Patient in clinical research study    Peroneal muscular atrophy    Scoliosis (and kyphoscoliosis), idiopathic    SOBOE (shortness of breath on exertion)    Syncope and collapse    Chronic pain disorder    DDD (degenerative disc disease), lumbar    Diabetic polyneuropathy associated with type 2 diabetes mellitus ([address]    Other secondary scoliosis, lumbosacral region    Thoracic spondylosis without myelopathy    Morbid obesity due to excess calories ([facility])    Age-related nuclear cataract of both eyes    Hypermetropia, bilateral    Hypertensive retinopathy, bilateral    Vitreous degeneration, bilateral    Acute bilateral low back pain with left-sided sciatica    History of CVA (cerebrovascular accident) without residual deficits    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 stage 3 chronic kidney disease (HCC)    S/P mastectomy, right    Acute cystitis without hematuria    Hypomagnesemia    Chest pain    CAD S/P percutaneous coronary angioplasty    Hyperglycemia due to type 2 diabetes mellitus [facility]    Hx of heart artery stent    Nuclear senile cataract    Unintentional weight loss    Chronic anemia    Facial abscess    Asymptomatic bacteriuria    Acute encephalopathy    Tobacco use disorder    Severe malnutrition (HCC)    Acute right eye pain    Suspected condition    Bipolar affective disorder, currently depressed, moderate (HCC)    Seasonal allergies    Symptomatic bradycardia    Dyspnea       IMPRESSION/RECOMMENDATIONS:    Breast carcinoma:  -Diagnosis 2014  -T1c, N1, M0  -Tumor size 1.4 cm  -ER positive, MI positive and HER2/edward negative  -Oncotype DX score 8  -Status post lumpectomy and axillary node evaluation.  -Status post radiation  -The patient has not been on Femara since June 2020.  -It is questionable whether she actually has been taking her Femara properly    Back pain:  -CT scan is concerning for bone metastasis from breast cancer  -Bone scan is ordered  -CT directed needle biopsy is ordered      Thank you for asking me to see the patient.        Latrice Rangel MD  Please Contact Through [employer]

## 2022-12-12 NOTE — DISCHARGE INSTR - COC
Continuity of Care Form    Patient Name: Michael Mejia   :  1960  MRN:  [id number]    Admit date:  12/10/2022  Discharge date:  2022    Code Status Order: Full Code   Advance Directives:     Admitting Physician:  Yue Jain MD  PCP: Moreno Sheth    Discharging Nurse: [facility] Unit/Room#: 3748/0478-01  Discharging Unit Phone Number: [phone]    Emergency Contact:   Extended Emergency Contact Information  Primary Emergency Contact: [place] [address] [place] [address] Phone: [phone]  Mobile Phone: [phone]  Relation: Child  Secondary Emergency Contact: [facility] Phone: [phone]  Mobile Phone: [phone]  Relation: Other    Past Surgical History:  Past Surgical History:   Procedure Laterality Date    BREAST LUMPECTOMY      Bilateral:breast cancer    CARDIAC CATHETERIZATION  2020    Dr. Jonah Hoyos), DAYANA to Diag 1    COLONOSCOPY N/A 2021    COLONOSCOPY DIAGNOSTIC performed by Aysha Valiente MD at [address]  2/3/2021    CT BONE MARROW BIOPSY 2/3/2021 Mendez Cadet MD [facility] CT SCAN    HYSTERECTOMY (CERVIX STATUS UNKNOWN)      Benign:no cervical cancer per pt'    KIDNEY REMOVAL      right    OTHER SURGICAL HISTORY Right     orif right ankle    TEMPORAL ARTERY BIOPSY Right 2021    RIGHT TEMPORAL ARTERY BIOPSY LIGATION performed by Familia Todd MD at [address] 2021    EGD BIOPSY performed by Aysha Valiente MD at [address]       Immunization History:   Immunization History   Administered Date(s) Administered    COVID-19, MODERNA BLUE border, Primary or Immunocompromised, (age 12y+), IM, 100 mcg/0.5mL 2021, 2021    Influenza, FLUARIX, FLULAVAL, FLUZONE (age 10 mo+) AND AFLURIA, (age 1 y+), PF, 0.5mL 10/11/2016, 2020    Influenza, FLUCELVAX, (age 10 mo+), MDCK, PF, 0.5mL 2017    Tdap (Boostrix, Adacel) 10/07/2020       Active Problems:  Patient Active Problem List   Diagnosis Code    Acute hyperglycemia R73.9    Hypertension, uncontrolled I10    Bilateral malignant neoplasm of breast in female ([facility] C50.911, C50.912    Microalbuminuria R80.9    Obesity (BMI 30-39. 9) E66.9    Slurred speech R47.81    Hx of ischemic CVA I63.40    Brain metastases (HCC) C79.31    Carotid stenosis, bilateral:<50%:per US 7/2016 I65.23    SHARRON (acute kidney injury) (HCC) N17.9    Lactic acidosis E87.20    Frequent falls R29.6    Depression/anxiety F41.8    Abnormal brain MRI R90.89    Acute bilateral low back pain without sciatica M54.50    Closed fracture of right ankle, with routine healing, subsequent encounter S82.891D    Stage 3a chronic kidney disease (HCC) N18.31    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 (HCC) E11.59    Dyslipidemia associated with type 2 diabetes mellitus (HCC) E11.69, E78.5    Bipolar disorder (HCC) F31.9    Cardiomegaly I51.7    Cardiomyopathy ([facility] I42.9    Carpal tunnel syndrome G56.00    Chronic systolic heart failure (HCC) I50.22    Diffuse cystic mastopathy N60.19    Edema R60.9    Gallstone pancreatitis K85.10    Gout M10.9    History of breast cancer Z85.3    History of renal cell carcinoma Z85.528    Cardiomyopathy in other diseases classified elsewhere I43    Mononeuritis G58.9    Myalgia and myositis PEF0733    Nonspecific abnormal electrocardiogram (ECG) (EKG) R94.31    Patient in clinical research study Z00.6    Peroneal muscular atrophy G60.0    Scoliosis (and kyphoscoliosis), idiopathic M41.20    SOBOE (shortness of breath on exertion) R06.02    Syncope and collapse R55    Chronic pain disorder G89.4    DDD (degenerative disc disease), lumbar M51.36    Diabetic polyneuropathy associated with type 2 diabetes mellitus (HCC) E11.42    Other secondary scoliosis, lumbosacral region M41.57    Thoracic spondylosis without myelopathy M47.814    Morbid obesity due to excess calories ([facility] E66.01    Age-related nuclear cataract of both eyes H25.13    Hypermetropia, bilateral H52.03    Hypertensive retinopathy, bilateral H35.033    Vitreous degeneration, bilateral H43.813    Acute bilateral low back pain with left-sided sciatica M54.42    History of CVA (cerebrovascular accident) without residual deficits Z86.73    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 stage 3 chronic kidney disease (HCC) I13.0, I50.22, N18.30    S/P mastectomy, right Z90.11    Acute cystitis without hematuria N30.00    Hypomagnesemia E83.42    Chest pain R07.9    CAD S/P percutaneous coronary angioplasty I25.10, Z98.61    Hyperglycemia due to type 2 diabetes mellitus ([facility] E11.65    Hx of heart artery stent Z95.5    Nuclear senile cataract H25.10    Unintentional weight loss R63.4    Chronic anemia D64.9    Facial abscess L02.01    Asymptomatic bacteriuria R82.71    Acute encephalopathy G93.40    Tobacco use disorder F17.200    Severe malnutrition (HCC) E43    Acute right eye pain H57.11    Suspected condition R69    Bipolar affective disorder, currently depressed, moderate ([facility]) F31.32    Seasonal allergies J30.2    Symptomatic bradycardia R00.1    Dyspnea R06.00       Isolation/Infection:   Isolation            No Isolation          Patient Infection Status       Infection Onset Added Last Indicated Last Indicated By Review Planned Expiration Resolved Resolved By    None active    Resolved    COVID-19 (Rule Out) 12/10/22 12/10/22 12/10/22 COVID-19 & Influenza Combo (Ordered)   12/10/22 Rule-Out Test Resulted    COVID-19 21 COVID-19, Rapid   21     COVID-19 (Rule Out) 21 COVID-19, Rapid (Ordered)   21 Rule-Out Test Resulted    C-diff Rule Out 21 Clostridium difficile toxin/antigen (Ordered)   21 Angy Marker, RN    COVID-19 (Rule Out) 21 COVID-19, Rapid (Ordered) 09/28/21 Rule-Out Test Resulted    COVID-19 (Rule Out) 09/27/21 09/27/21 09/27/21 COVID-19, Rapid (Ordered)   09/27/21 Rule-Out Test Resulted    COVID-19 (Rule Out) 02/05/21 02/05/21 02/05/21 COVID-19 (Ordered)   02/05/21 Rule-Out Test Resulted    COVID-19 (Rule Out) 01/28/21 01/28/21 01/28/21 COVID-19 (Ordered)   01/28/21 Rule-Out Test Resulted    C-diff Rule Out 01/26/21 01/26/21 01/27/21 Clostridium difficile toxin/antigen (Ordered)   01/27/21 Rule-Out Test Resulted    COVID-19 (Rule Out) 11/11/20 11/11/20 11/11/20 COVID-19 (Ordered)   11/12/20 Rule-Out Test Resulted    COVID-19 (Rule Out) 06/07/20 06/07/20 06/07/20 COVID-19 (Ordered)   06/07/20 Marissa Wiggins RN            Nurse Assessment:  Last Vital Signs: BP (!) 168/61   Pulse 54   Temp 97.6 °F (36.4 °C) (Oral)   Resp 16   Ht 5' 3\" (1.6 m)   Wt 113 lb (51.3 kg)   SpO2 99%   BMI 20.02 kg/m²     Last documented pain score (0-10 scale): Pain Level: 9  Last Weight:   Wt Readings from Last 1 Encounters:   12/12/22 113 lb (51.3 kg)     Mental Status:  oriented and alert    IV Access:  - None    Nursing Mobility/ADLs:  Walking   Assisted  Transfer  Independent  Bathing  Assisted  Dressing  Assisted  Toileting  Independent  Feeding  Independent  Med Admin  Independent  Med Delivery   whole    Wound Care Documentation and Therapy:  Incision 08/09/21 Face Right (Active)   Number of days: 490        Elimination:  Continence: Bowel: Yes  Bladder: Yes  Urinary Catheter: None   Colostomy/Ileostomy/Ileal Conduit: No       Date of Last BM: 12/16/2022    Intake/Output Summary (Last 24 hours) at 12/12/2022 1447  Last data filed at 12/12/2022 0837  Gross per 24 hour   Intake 240 ml   Output 1150 ml   Net -910 ml     I/O last 3 completed shifts:  In: -   Out: 1050 [Urine:1050]    Safety Concerns:      At Risk for Falls    Impairments/Disabilities:      Vision    Nutrition Therapy:  Current Nutrition Therapy:   - Oral Diet:  Carb Control 3 carbs/meal (1500kcals/day)    Routes of Feeding: Oral  Liquids: No Restrictions  Daily Fluid Restriction: no  Last Modified Barium Swallow with Video (Video Swallowing Test): not done    Treatments at the Time of Hospital Discharge:   Respiratory Treatments: ***  Oxygen Therapy:  is not on home oxygen therapy. Ventilator:    - No ventilator support    Rehab Therapies: Physical Therapy and Occupational Therapy  Weight Bearing Status/Restrictions: No weight bearing restrictions  Other Medical Equipment (for information only, NOT a DME order):  walker  Other Treatments: ***    Patient's personal belongings (please select all that are sent with patient):  Glasses, Dentures upper, Purse, Cell Phone, . Henry Bell RN SIGNATURE:  Electronically signed by Ml Jin RN on 12/16/22 at 12:58 PM EST    CASE MANAGEMENT/SOCIAL WORK SECTION    Inpatient Status Date: 12/10/2022    Readmission Risk Assessment Score:  Readmission Risk              Risk of Unplanned Readmission:  0           Discharging to Facility/ [address] [place] [address]     / signature: Electronically signed by IBETH Suarez on 12/16/22 at 12:55 PM EST    PHYSICIAN SECTION    Prognosis: Fair    Condition at Discharge: Stable    Rehab Potential (if transferring to Rehab): Fair    Recommended Labs or Other Treatments After Discharge: Follow up with PCP within 1-2 weeks. Follow up with GI for consideration of another type of scope to make sure you don't have any cancer in your pancreas. Physician Certification: I certify the above information and transfer of Aristides Hansen  is necessary for the continuing treatment of the diagnosis listed and that she requires [facility] for less 30 days.      Update Admission H&P: No change in H&P    PHYSICIAN SIGNATURE:  Electronically signed by Keisha Clark MD on 12/16/22 at 1:24 PM EST

## 2022-12-12 NOTE — PROGRESS NOTES
Comprehensive Nutrition Assessment    Type and Reason for Visit:  Initial, Consult, Patient Education    Nutrition Recommendations/Plan:   Carb control, heart healthy diet regimen  Glucerna with meals  Will monitor nutritional adequacy, nutrition-related labs, weights, BMs, and clinical progress      Malnutrition Assessment:  Malnutrition Status: At risk for malnutrition (Comment) (12/12/22 1130)      Nutrition Assessment:    Patient admitted with dyspnea; extensive medical history noted. Currently on a carb control, heart healthy diet regimen. Patient reported eating very well at home but experienced unintentional weight loss over the past several months. Patient agreeable to Glucerna supplements, patient reported enjoying them. CHF diet education consult ordered; patient reported not having any issues with swelling at home, no water pill noted on home medication list.  Patient is on an extensive insulin/hypoglycemic regimen at home, history of elevated Hbg A1C greater than 14. Will monitor need for further education when appropriate. Patient very concerned about cancer returning and became tearful prior to leaving the room. Nutrition Related Findings:    upper dentures, no lower-able to chew without difficulty Wound Type: None       Current Nutrition Intake & Therapies:    Average Meal Intake: 51-75%, %  Average Supplements Intake: Unable to assess  ADULT DIET; Regular; 4 carb choices (60 gm/meal); Low Fat/Low Chol/High Fiber/2 gm Na  ADULT ORAL NUTRITION SUPPLEMENT; Breakfast, Lunch, Dinner; Diabetic Oral Supplement    Anthropometric Measures:  Height: 5' 3\" (160 cm)  Ideal Body Weight (IBW): 115 lbs (52 kg)       Current Body Weight: 113 lb (51.3 kg),   IBW. Weight Source: Standing Scale  Current BMI (kg/m2): 20                          BMI Categories: Normal Weight (BMI 18.5-24. 9)    Estimated Daily Nutrient Needs:  Energy Requirements Based On: Kcal/kg  Weight Used for Energy Requirements: Current  Energy (kcal/day): 9189-7874 (35-40 kcal/51.3 kg)  Weight Used for Protein Requirements: Current  Protein (g/day): 77-92 (1.5-1.8 g/51.3 kg)  Method Used for Fluid Requirements: 1 ml/kcal  Fluid (ml/day):      Nutrition Diagnosis:   Increased nutrient needs related to increase demand for energy/nutrients as evidenced by weight loss    Nutrition Interventions:   Food and/or Nutrient Delivery: Continue Current Diet, Start Oral Nutrition Supplement  Nutrition Education/Counseling: Education initiated (avoiding excess sodium)  Coordination of Nutrition Care: Continue to monitor while inpatient  Plan of Care discussed with: patient    Goals:     Goals: PO intake 75% or greater       Nutrition Monitoring and Evaluation:      Food/Nutrient Intake Outcomes: Food and Nutrient Intake, Supplement Intake  Physical Signs/Symptoms Outcomes: Nutrition Focused Physical Findings    Discharge Planning:     Too soon to determine     STEPHANY, [address] [facility], [address] [place] [address],   Contact: [phone]

## 2022-12-12 NOTE — PROGRESS NOTES
Hospitalist Progress Note      PCP: Kalani Butterfield    Date of Admission: 12/10/2022    Chief Complaint: pain all over       Subjective:  I see that she had bone lesions on CT from admission. The patient tells me that most of her pains were in her extremities. When asked about back pain, though, she strongly endorses this. Medications:  Reviewed    Infusion Medications    sodium chloride      dextrose       Scheduled Medications    insulin lispro  0-4 Units SubCUTAneous Nightly    insulin lispro  0-16 Units SubCUTAneous TID WC    insulin glargine  30 Units SubCUTAneous QAM    aspirin  81 mg Oral Daily    atorvastatin  20 mg Oral Daily    ferrous sulfate  325 mg Oral Daily with breakfast    Liraglutide  1.8 mg SubCUTAneous Daily    lisinopril  40 mg Oral Daily    paliperidone  9 mg Oral QAM    pantoprazole  40 mg Oral Daily    ticagrelor  90 mg Oral BID    traZODone  200 mg Oral Nightly    sodium chloride flush  5-40 mL IntraVENous 2 times per day    enoxaparin  40 mg SubCUTAneous Daily     PRN Meds: acetaminophen, iopamidol, clonazePAM, oxyCODONE-acetaminophen, sodium chloride flush, sodium chloride, ondansetron **OR** ondansetron, polyethylene glycol, acetaminophen **OR** acetaminophen, glucose, dextrose bolus **OR** dextrose bolus, glucagon (rDNA), dextrose      Intake/Output Summary (Last 24 hours) at 12/12/2022 0751  Last data filed at 12/12/2022 0506  Gross per 24 hour   Intake --   Output 650 ml   Net -650 ml       Physical Exam Performed:    BP (!) 145/68   Pulse 58   Temp 98.4 °F (36.9 °C) (Oral)   Resp 16   Ht 5' 3\" (1.6 m)   Wt 113 lb (51.3 kg)   SpO2 97%   BMI 20.02 kg/m²     General appearance: No apparent distress, appears stated age. HEENT: Pupils equal, round, and reactive to light. Conjunctivae/corneas clear. Neck: Supple, with full range of motion. No jugular venous distention. Trachea midline. Respiratory:  Normal respiratory effort.  Clear to auscultation, bilaterally without Rales/Wheezes/Rhonchi. Cardiovascular: Regular rate and rhythm with normal S1/S2 without murmurs, rubs or gallops. Abdomen: Soft, non-tender, non-distended with normal bowel sounds. Musculoskeletal: No clubbing, cyanosis or edema bilaterally. Full range of motion without deformity. Skin: Skin color, texture, turgor normal.  No rashes or lesions. Neurologic:  Neurovascularly intact without any focal sensory/motor deficits. Cranial nerves: II-XII intact, grossly non-focal.  Psychiatric: Alert and oriented, thought content appropriate, normal insight  Capillary Refill: Brisk, 3 seconds, normal   Peripheral Pulses: +2 palpable, equal bilaterally       Labs:   Recent Labs     12/10/22  1606 12/11/22  0445   WBC 4.8 4.4   HGB 10.7* 10.1*   HCT 32.5* 30.9*    151     Recent Labs     12/10/22  1606 12/11/22  0445 12/11/22  1508    138 135*   K 3.9 4.3 4.5    104 100   CO2 27 27 27   BUN 13 11 12   CREATININE 0.9 1.1 1.1   CALCIUM 9.3 8.7 8.6     Recent Labs     12/10/22  1606   AST 45*   ALT 77*   BILITOT <0.2   ALKPHOS 191*     Recent Labs     12/11/22  0445   INR 1.07     Recent Labs     12/10/22  2239 12/11/22  0100 12/11/22  0445   CKTOTAL  --  497*  --    TROPONINI <0.01 <0.01 <0.01       Urinalysis:      Lab Results   Component Value Date/Time    NITRU Negative 12/10/2022 01:42 PM    WBCUA 3-5 12/10/2022 01:42 PM    BACTERIA 2+ 12/10/2022 01:42 PM    RBCUA 5-10 12/10/2022 01:42 PM    BLOODU MODERATE 12/10/2022 01:42 PM    SPECGRAV >=1.030 12/10/2022 01:42 PM    GLUCOSEU >=1000 12/10/2022 01:42 PM    GLUCOSEU >=1000 mg/dL 06/07/2010 03:38 PM       Radiology:  CT CHEST PULMONARY EMBOLISM W CONTRAST   Final Result   1. No pulmonary embolism identified. 2.  Apparent linear filling defect within the mid descending thoracic aorta   is thought to be artifactual, rather than due to intimal tear/focal   dissection.   If there is clinical concern for acute aortic dissection, CTA   chest dissection protocol would be recommended for further evaluation. 3.  Abnormal extensive mixed sclerosis/lucency throughout the spine. Differential considerations include myeloma versus metastases. Clinical   correlation recommended. 4.  Additional nonemergent findings, as above. XR CHEST PORTABLE   Final Result   No acute process. IP CONSULT TO CARDIOTHORACIC SURGERY  IP CONSULT TO HOSPITALIST  IP CONSULT TO HEART FAILURE NURSE/COORDINATOR  IP CONSULT TO DIETITIAN  IP CONSULT TO ONCOLOGY    Assessment/Plan:    Active Hospital Problems    Diagnosis     Dyspnea [R06.00]      Priority: Medium    Chronic anemia [D64.9]     Hyperglycemia due to type 2 diabetes mellitus ([facility] [E11.65]     Hypertension, uncontrolled [I10]        Judge Severance is a 58-year-old female with significant past medical history of hypertension, hyperlipidemia, chronic kidney disease, and CAD who presents to [facility] emergency department with a constellation of vague symptoms such as generalized body pains and chronic upper back pain. She is unable to define an onset of these pains, she states back pain has been for many years, and the body aches have been coming and going for \"long time. \"      Lesions on thoracic spine. Concerning given her h/o breast cancer (bilateral resection and radiation in 2015) and renal cancer (R resection and radiation when she was 4years old). Also, patient says she has unintentionally lost 90 lbs in the last year. Oncology consulted. Muscular deconditioning. PT/OT rec'd SNF, but patient might refuse. Aortic dissection ruled out. CT showed artifact per CT surgery. DM2. Insulin regimen. A1c recently 14.9, doubtful compliance at home. HTN. Lisinopril.    CAD. Aspirin, ticagrelor, statin. She is on paliperidone. I am uncertain as to her exact psych diagnosis.   Also trazodone and clonazepam.          DVT Prophylaxis: enoxaparin  Diet: ADULT DIET; Regular; 4 carb choices (60 gm/meal); Low Fat/Low Chol/High Fiber/2 gm Na  Code Status: Full Code  PT/OT Eval Status: rec'd SNF    Dispo - perhaps 12/13 or later, pending oncology input. She lives at home and seems to want to refuse SNF.     Appropriate for A1 Discharge Unit: Amee Clark MD

## 2022-12-12 NOTE — CONSULTS
Consult placed  Doctor on floor will see patient  Who:Dr. Jacqui Dill  Date:12/12/2022,  Time:7:57 AM        Electronically signed by Bakari Pimentel on 12/12/2022 at 7:57 AM

## 2022-12-12 NOTE — ACP (ADVANCE CARE PLANNING)
Advance Care Planning     Advance Care Planning Inpatient Note  [facility] Department    Today's Date: 12/12/2022  Unit: [place] C3 TELE/MED SURG/ONC    Received request from IDT Member. Upon review of chart and communication with care team, patient's decision making abilities are not in question. . Patient was/were present in the room during visit. Goals of ACP Conversation:  Discuss advance care planning documents    Health Care Decision Makers:       Primary Decision Maker: Rober Brewster - [phone]    Secondary Decision Maker: CorkyMoi - Brother/Sister - [phone]    Supplemental (Other) Decision Maker: Cristi Dumontmer - [phone]  Summary:  [address]  Updated Healthcare Decision [facility]    Advance Care Planning Documents (Patient Wishes):  Healthcare Power of /Advance Directive Appointment of Health Care Agent     Assessment:  Progress notes reviewed. There is no indication that the patient lacks decisional capacity per the medical team.  Patient presently is able to communicate her wishes around decision making and verbalizes insight into her medical condition and upcoming test.      Interventions:  Provided education on documents for clarity and greater understanding.  was able to work with RN, Sotero Walters, to correct the ACP navigator to reflect decision maker as inactive and update patient's wishes for proxy decision makers. Care Preferences Communicated:   No    Outcomes/Plan:  ACP Discussion: Patient wanted to take an incoming phone call and postpone completion of documents for another time.  will return another time to continue ACP completion. Patient has an incomplete document scanned in her chart. The pages she does have do not reflect her current wishes, thus the value of completing a new document as soon as possible.     Electronically signed by Paloma Madrigal, [email or web address] on 12/12/2022 at 1:40 PM

## 2022-12-12 NOTE — CARE COORDINATION
Case Management Assessment  Initial Evaluation    Date/Time of Evaluation: 12/12/2022 2:38 PM  Assessment Completed by: IBETH Ta    If patient is discharged prior to next notation, then this note serves as note for discharge by case management. Patient Name: Peyton Schwartz                   YOB: 1960  Diagnosis: Dyspnea [R06.00]  Abnormal CT of the chest [R93.89]  Bilateral calf pain [M79.661, M79.662]                   Date / Time: 12/10/2022 12:50 PM    Patient Admission Status: Observation   Readmission Risk (Low < 19, Mod (19-27), High > 27): Readmission Risk Score: 15.5    Current PCP: Sung Hui  PCP verified by CM? Yes    Chart Reviewed: Yes      History Provided by: Patient  Patient Orientation: Alert and Oriented, Person, Place, Situation, Self    Patient Cognition: Alert    Hospitalization in the last 30 days (Readmission):  No        Advance Directives:      Code Status: Full Code   Patient's Primary Decision Maker is: Patient Declined (Legal Next of [address] [place] [address] as Decision Maker)    Primary Decision Maker: Ruel Mahmood - [phone]    Secondary Decision Maker: Moi Fried - Brother/Sister - [phone]    Supplemental (Other) Decision Maker: Lew Brewster - Other - [phone]    Discharge Planning:    Patient lives with: Alone Type of Home: Apartment  Primary Care Giver: Self  Patient Support Systems include: Children, Family Members, Friends/Neighbors   Current Financial resources: Medicaid  Current services prior to admission: Durable Medical Equipment            Current DME: Bedside Commode, Edyta Lyle Shower Chair            Type of Home Care services:  Skilled Therapy, Nursing Services    ADLS  Prior functional level: Independent in ADLs/IADLs  Current functional level: Assistance with the following:, Bathing, Toileting, Housework, Shopping, Mobility    PT AM-PAC: 14 /24  OT AM-PAC: 15 /24    Family can provide assistance at DC:  Other (comment)  Would you like Case Management to discuss the discharge plan with any other family members/significant others, and if so, who? Yes  Plans to Return to Present Housing: Yes  Potential Assistance needed at discharge: N/A  Patient expects to discharge to: [address] [place] [address] for transportation at discharge: Self    Financial    Payor: [address] [place] [address] / Plan: [address] [place] [address] / Product Type: *No Product type* /     Does insurance require precert for SNF: Yes    Potential assistance Purchasing Medications: No  Meds-to-Beds request: Yes      [id number] [address], [address]  [address] [facility] [address]  Phone: [phone] Fax: [phone]    [address]  800 [facility] [address]  Phone: [phone] Fax: [phone]    722 [facility] [address], [address] [phone] [place] [phone]  [address]  [facility] [address] [address]  Phone: [phone] Fax: [phone]    [facility] [address], [address]/ Deshaun Muniz [facility] [phone] - F [phone]  [address] [facility] [address]  Phone: [phone] Fax: [phone]      Notes:    Factors facilitating achievement of predicted outcomes: Friend support and Has needed Durable Medical Equipment at home    Barriers to discharge: Pain, Limited family support, Decreased endurance, Lower extremity weakness, and pending bone scan    Additional Case Management Notes: Patient from home alone, 3 flights of steps to enter. Refusing SNF, agreeable to [facility] reports used [facility] in the past, referral sent to [address] [place] [address] for [facility]. Patient reports friend \"Mama\" Seth aSnta [phone] will assist with cook cleaning etc. Okay for writer to call. Writer left HIPPA compliant VM return call requested.     The Plan for Transition of Care is related to the following treatment goals of Dyspnea [R06.00]  Abnormal CT of the chest [R93.89]  Bilateral calf pain [M79.661, M79.662]      The Patient and/or Patient Representative Agree with the Discharge Plan?  Yes    Erick Ramirez, [place]  Case Management Department  Ph: [phone] Fax: [phone]

## 2022-12-12 NOTE — DISCHARGE INSTRUCTIONS
Heart Failure Resources:    Heart Failure Interactive Workbook:   Go to [email or web address].com/aha-heartfailure for a Free Heart Failure Interactive Workbook provided by [employer]. This interactive workbook will provide information on Healthier Living with Heart Failure. Please copy and paste link into search bar. Use your mouse to scroll through the pages. HF [place] cecille:   Heart Failure Free smart phone cecille available for iPhone and Android download. Use your phone to track sodium intake, fluid intake, symptoms, and weight. Low Sodium Diet:  Go to www. [employer]. org website for eLong.com which is Low Sodium! [employer] is a dialysis company, but this website offers free seasonal cookbooks. Each quarter, they will release 25-30 new recipes with a breakdown of calories, sodium, and glucose. Recipes:   Go to www.[employer]/recipes website for free recipes. Follow up with PCP within 1-2 weeks. Follow up with GI for consideration of another type of scope to make sure you don't have any cancer in your pancreas.

## 2022-12-12 NOTE — ACP (ADVANCE CARE PLANNING)
Advance Care Planning     General Advance Care Planning (ACP) Conversation    Date of Conversation: 12/10/2022  Conducted with: Patient with Decision Making Capacity    Healthcare Decision Maker:  Primary- Abril Valladares son [phone]  Secondary- Gi Dillon friend [phone] [address] [facility] \"mama\" [phone]  Today we documented desired Decision Maker(s), who is (are) NOT Legal Next of [facility]. ACP documents are required for decision maker authority. Content/Action Overview:   Has ACP document(s) NOT on file - requested patient to provide  Reviewed DNR/DNI and patient elects Full Code (Attempt Resuscitation)    Referral made to spiritual care    Length of Voluntary ACP Conversation in minutes:  <16 minutes (Non-Billable)    IBETH De Oliveira

## 2022-12-12 NOTE — PROGRESS NOTES
Pt a/o. VSS. Shift assessment complete and charted. Pt upset this morning as hem. onc had just left and told her that her CA may be back. Pt c/o back katelin- admin prn analgesic as ordered and requested. Pt stable and denied needs when writer left room.

## 2022-12-13 ENCOUNTER — APPOINTMENT (OUTPATIENT)
Dept: CT IMAGING | Age: 62
DRG: 092 | End: 2022-12-13
Payer: COMMERCIAL

## 2022-12-13 PROBLEM — R52 DIFFUSE PAIN: Status: ACTIVE | Noted: 2022-12-13

## 2022-12-13 LAB
BUN BLDV-MCNC: 14 MG/DL (ref 7–20)
CREAT SERPL-MCNC: 1.1 MG/DL (ref 0.6–1.2)
GFR SERPL CREATININE-BSD FRML MDRD: 56 ML/MIN/{1.73_M2}
GLUCOSE BLD-MCNC: 184 MG/DL (ref 70–99)
GLUCOSE BLD-MCNC: 225 MG/DL (ref 70–99)
GLUCOSE BLD-MCNC: 244 MG/DL (ref 70–99)
GLUCOSE BLD-MCNC: 286 MG/DL (ref 70–99)
PERFORMED ON: ABNORMAL

## 2022-12-13 PROCEDURE — 1200000000 HC SEMI PRIVATE

## 2022-12-13 PROCEDURE — 6370000000 HC RX 637 (ALT 250 FOR IP): Performed by: NURSE PRACTITIONER

## 2022-12-13 PROCEDURE — 84520 ASSAY OF UREA NITROGEN: CPT

## 2022-12-13 PROCEDURE — 6360000004 HC RX CONTRAST MEDICATION: Performed by: EMERGENCY MEDICINE

## 2022-12-13 PROCEDURE — 6370000000 HC RX 637 (ALT 250 FOR IP): Performed by: INTERNAL MEDICINE

## 2022-12-13 PROCEDURE — G0378 HOSPITAL OBSERVATION PER HR: HCPCS

## 2022-12-13 PROCEDURE — 2580000003 HC RX 258: Performed by: NURSE PRACTITIONER

## 2022-12-13 PROCEDURE — 97110 THERAPEUTIC EXERCISES: CPT

## 2022-12-13 PROCEDURE — 36415 COLL VENOUS BLD VENIPUNCTURE: CPT

## 2022-12-13 PROCEDURE — 82565 ASSAY OF CREATININE: CPT

## 2022-12-13 PROCEDURE — 97116 GAIT TRAINING THERAPY: CPT

## 2022-12-13 PROCEDURE — 74177 CT ABD & PELVIS W/CONTRAST: CPT

## 2022-12-13 PROCEDURE — 97530 THERAPEUTIC ACTIVITIES: CPT

## 2022-12-13 RX ORDER — ASPIRIN 81 MG/1
81 TABLET ORAL DAILY
Status: DISCONTINUED | OUTPATIENT
Start: 2022-12-14 | End: 2022-12-16 | Stop reason: HOSPADM

## 2022-12-13 RX ORDER — ENOXAPARIN SODIUM 100 MG/ML
40 INJECTION SUBCUTANEOUS DAILY
Status: DISCONTINUED | OUTPATIENT
Start: 2022-12-14 | End: 2022-12-16 | Stop reason: HOSPADM

## 2022-12-13 RX ADMIN — OXYCODONE AND ACETAMINOPHEN 1 TABLET: 7.5; 325 TABLET ORAL at 17:18

## 2022-12-13 RX ADMIN — INSULIN GLARGINE 30 UNITS: 100 INJECTION, SOLUTION SUBCUTANEOUS at 08:35

## 2022-12-13 RX ADMIN — IOPAMIDOL 75 ML: 755 INJECTION, SOLUTION INTRAVENOUS at 12:44

## 2022-12-13 RX ADMIN — SODIUM CHLORIDE, PRESERVATIVE FREE 10 ML: 5 INJECTION INTRAVENOUS at 20:02

## 2022-12-13 RX ADMIN — SODIUM CHLORIDE, PRESERVATIVE FREE 5 ML: 5 INJECTION INTRAVENOUS at 05:02

## 2022-12-13 RX ADMIN — SODIUM CHLORIDE, PRESERVATIVE FREE 10 ML: 5 INJECTION INTRAVENOUS at 08:34

## 2022-12-13 RX ADMIN — FERROUS SULFATE TAB 325 MG (65 MG ELEMENTAL FE) 325 MG: 325 (65 FE) TAB at 08:34

## 2022-12-13 RX ADMIN — LISINOPRIL 40 MG: 20 TABLET ORAL at 08:34

## 2022-12-13 RX ADMIN — INSULIN LISPRO 4 UNITS: 100 INJECTION, SOLUTION INTRAVENOUS; SUBCUTANEOUS at 08:34

## 2022-12-13 RX ADMIN — INSULIN LISPRO 8 UNITS: 100 INJECTION, SOLUTION INTRAVENOUS; SUBCUTANEOUS at 16:57

## 2022-12-13 RX ADMIN — ATORVASTATIN CALCIUM 20 MG: 10 TABLET, FILM COATED ORAL at 08:34

## 2022-12-13 RX ADMIN — OXYCODONE AND ACETAMINOPHEN 1 TABLET: 7.5; 325 TABLET ORAL at 08:43

## 2022-12-13 RX ADMIN — TRAZODONE HYDROCHLORIDE 200 MG: 50 TABLET ORAL at 20:02

## 2022-12-13 RX ADMIN — PALIPERIDONE 9 MG: 3 TABLET, EXTENDED RELEASE ORAL at 08:36

## 2022-12-13 RX ADMIN — PANTOPRAZOLE SODIUM 40 MG: 40 TABLET, DELAYED RELEASE ORAL at 08:34

## 2022-12-13 ASSESSMENT — PAIN SCALES - GENERAL
PAINLEVEL_OUTOF10: 7
PAINLEVEL_OUTOF10: 8
PAINLEVEL_OUTOF10: 0

## 2022-12-13 ASSESSMENT — PAIN DESCRIPTION - ORIENTATION: ORIENTATION: MID

## 2022-12-13 ASSESSMENT — PAIN DESCRIPTION - DESCRIPTORS
DESCRIPTORS: DISCOMFORT;CRAMPING
DESCRIPTORS: CRAMPING;DISCOMFORT;ACHING

## 2022-12-13 ASSESSMENT — PAIN DESCRIPTION - LOCATION
LOCATION: BACK
LOCATION: BACK

## 2022-12-13 NOTE — CARE COORDINATION
Hospital day 1: Patient on C3 re Dyspnea care managed by IM and Hem/Onc. Patient from home alone with 3 flight of steps to enter. Patient exploring SNF with writer citlali referral to [address] choice and [facility] [place] 2nd choice. Referrals sent. Patient has expressed desire to dc home prior to [place] edu could dc prior to completing rehab if wishes, but staff with concerns on level of function. Of note: Patient had to be staff assist back to bed and used as support to need for placement. SW will follow. Tanna Story, [address]: Spoke with Patient aware both facilities accepting request [facility] [place] start [address] [facility] alerted to pull cert. SW will follow. IBETH Storey

## 2022-12-13 NOTE — PROGRESS NOTES
Physical Therapy  Facility/Department: [facility] C3 TELE/MED SURG/ONC  Daily Treatment Note  NAME: Moni Gonzalez  : 1960  MRN: [id number]    Date of Service: 2022    Discharge Recommendations:  Subacute/Skilled Nursing Facility   PT Equipment Recommendations  Equipment Needed: No  Other: defer  [facility] 6 Clicks Inpatient Mobility:  AM-PAC Mobility Inpatient   How much difficulty turning over in bed?: None  How much difficulty sitting down on / standing up from a chair with arms?: A Little  How much difficulty moving from lying on back to sitting on side of bed?: A Little  How much help from another person moving to and from a bed to a chair?: A Little  How much help from another person needed to walk in hospital room?: A Little  How much help from another person for climbing 3-5 steps with a railing?: A Lot  AM-PAC Inpatient Mobility Raw Score : 18  AM-PAC Inpatient T-Scale Score : 43.63  Mobility Inpatient CMS 0-100% Score: 46.58  Mobility Inpatient CMS G-Code Modifier : CK      Patient Diagnosis(es): The primary encounter diagnosis was Abnormal CT of the chest. A diagnosis of Bilateral calf pain was also pertinent to this visit. Assessment   Assessment: Pt tolerated session well, improved functional mobility to CGA x1 with transfers and AMB up to 100ft x1 and 30ft x1. Pt notes at end of session she was too tired to participate in stair tranining but would benefit from future session to focus on stairs due to ammount pt has at home. Pt notes she does not have someone nearby who would be able to stay with her, states she has a friend who might be able to check on her. Continue to recomend SNF if pt does not have 24/7 hour assist.  Activity Tolerance: Patient tolerated treatment well;Patient limited by fatigue;Patient limited by endurance; Patient limited by pain  Equipment Needed: No  Other: defer     Plan    Physcial Therapy Plan  General Plan: 3-5 times per week  Specific Instructions for Next Treatment: progress mobility as tolerated  Current Treatment Recommendations: Strengthening;Balance training;Gait training;Functional mobility training;Stair training;Neuromuscular re-education;Transfer training; Endurance training;Equipment evaluation, education, & procurement;Patient/Caregiver education & training; Safety education & training; Therapeutic activities; Home exercise program     Restrictions  Restrictions/Precautions  Restrictions/Precautions: General Precautions, Fall Risk  Required Braces or Orthoses?: No  Position Activity Restriction  Other position/activity restrictions: up with assist, purewick     Subjective    Subjective  Subjective: Pt supine in bed at start of session, agreeable to therapy  Pain: Pt denies pain  Orientation  Overall Orientation Status: Within Functional Limits  Orientation Level: Oriented X4  Cognition  Overall Cognitive Status: Exceptions  Arousal/Alertness: Appropriate responses to stimuli  Attention Span: Attends with cues to redirect  Memory: Decreased short term memory  Safety Judgement: Decreased awareness of need for assistance  Problem Solving: Assistance required to identify errors made  Insights: Decreased awareness of deficits  Initiation: Does not require cues  Sequencing: Does not require cues  Cognition Comment: Pt with low arousal throughout session, requiring cues to redirect     Objective   Vitals  Heart Rate: 58  BP: 134/68  BP Location: Right upper arm  MAP (Calculated): 90  SpO2: 97 %  O2 Device: None (Room air)  Bed Mobility Training  Bed Mobility Training: Yes  Overall Level of Assistance: Supervision;Stand-by assistance  Interventions: Verbal cues  Rolling: Stand-by assistance  Supine to Sit: Supervision; Adaptive equipment; Additional time  Scooting:  Additional time;Stand-by assistance  Balance  Sitting: Intact  Standing: Impaired  Standing - Static: Fair  Standing - Dynamic: Good  Transfer Training  Transfer Training: Yes  Overall Level of Assistance: Contact-guard assistance;Assist X1  Sit to Stand: Contact-guard assistance;Assist X1  Stand to Sit: Contact-guard assistance;Assist X1  Bed to Chair: Contact-guard assistance;Assist X1;Additional time  Toilet Transfer: Contact-guard assistance;Assist X1  Gait Training  Gait Training: Yes  Gait  Overall Level of Assistance: Contact-guard assistance;Assist X1;Additional time; Adaptive equipment  Interventions: Safety awareness training; Tactile cues; Verbal cues  Base of Support: Widened  Speed/Olga: Shuffled;Pace decreased (< 100 feet/min)  Step Length: Left shortened;Right shortened  Gait Abnormalities: Shuffling gait; Decreased step clearance  Distance (ft):  (30x2, 100x1)  Assistive Device: Walker, rolling;Gait belt     PT Exercises  Exercise Treatment: x 10 BLE seated : ankle pumps, LAQ, marches, clamshell -- cues for technique and full ROM     Safety Devices  Type of Devices: All fall risk precautions in place;Gait belt; Chair alarm in place;Call light within reach;Nurse notified; Left in chair       Goals  Short Term Goals  Time Frame for Short Term Goals: 1 week (12/18) unless otherwise specified  Short Term Goal 1: Pt will be CGA with bed mobility. - goal met 12/13  Short Term Goal 2: Pt will be CGA with transfers with RW. - goal met 12/13  Short Term Goal 3: Pt will ambulate 25 ft with min A and RW. - goal met 12/13  Short Term Goal 4: 12/14: Pt will participate in 12-15 reps of BLE exercises to promote strength and activity tolerance. - goal met 12/13  Short Term Goal 5: by 12/15 pt will ascend and decend 3 flights of stairs with CGA  and (B) HR to simulate home set up  Patient Goals   Patient Goals : \"to get better\"    Education  Patient Education  Education Given To: Patient  Education Provided: Role of Therapy;Plan of Care;Home Exercise Program;Transfer Training; Fall Prevention Strategies  Education Method: Verbal  Barriers to Learning: None  Education Outcome: Verbalized understanding;Continued education needed PHYSICAL THERAPY HEART FAILURE EDUCATION:  PHYSICAL THERAPY HEART FAILURE EDUCATION GOALS:  1. Identifying HF Activity Zone with the Self Check Plan prior to exercises or walking    Patient educated in and demonstrated/verbalized understanding Identifying HF activity zone with the Self Check Plan referencing Red/Yellow/Green Light Symbol prior to exercises or walking  to appropriately determine daily activity level. 2.Rating self on RICK scale of perceived exertion   Patient educated in and demonstrated and/or verbalized understanding Rating self on RICK scale of perceived exertion  3. HF Therapeutic Exercises  Pt educated in and completed Therapeutic exercises (Supine, Seated ,Standing, walking) as indicated below for 1 set) to promote circulation and prevent complications of bedrest with patient verbalizes understanding of employing green zone, yellow zone and red zone to seek provider input and evaluation. 4. Daily Weight check/Stepping on Scale  Pt educated in importance of checking body weight daily. Barriers to completion of Daily weight check are identified with recommendations made or Patient demonstrates and/or verbalizes safety in:stepping up to weight self and complete downward gaze/head nod to read scale on standard scale  and safety stepping off scale. 5. Teach back of Elements of PT HF Education  Pt voices and demonstrates appropriate teach back of elements of Physical Therapy Heart Failure Education Program                        1)Heart Failure Zones Self Check Plan referencing Red/Yellow/Green Light Symbol with:  []Green Zone/All Clear:   physical activity is normal for you,   No new swelling in feet, ankles, legs or stomach,   No weight gain of more than 2-3 pounds,   No chest pain or worsening of shortness of breath.    (Continue daily: weight check, meds as directed, low salt eating, monitoring of fluid intake, balance activity, follow up visits)  [x]Yellow Zone/Caution:   Increased cough or shortness of breath with activity  Increased swelling in your feet, ankles, legs or stomach from baseline  Weight gain or loss of more than 2-3 pounds in 1 day. Increase in the number of pillows needed while sleeping  (Check In!: You need to contact your doctor or provider as soon as possible)  []Red Zone/Medical Alert:  Unrelieved chest pain or shortness of breath, especially while resting  Increased Discomfort or swelling in the abdomen or lower body  Sudden weight gain of more than 5 pounds in a week  Increased cough with bubbly and/or pink sputum  (Warning: You need to be seen right away . If you cannot reach your physician, call 911)    2)Elpidio Rating of Perceived Exertion (RPE) Scale     The Elpidio rating scale ranges from 6 to 20, where 6 means \"no exertion at all\" and 20 means \"maximal exertion. \" The patient chooses the number that best describes their level of exertion. This will objectify the intensity level of the patient's activity, and the therapist can use this information to accelerate or decelerate activity levels to reach the desired range. The patient should appraise their feeling of exertion as honestly as possible, without thinking about what the actual physical load is. Their feeling of effort and exertion is important, and it should not be compared to the level of others. Patient's should target exercises for very light to moderate (9-11/20) for this phase of their rehab. Elpidio RPE Scale     Activity Completed:  AMB 30ft     []6  No exertion at all  []7  Extremely light  []8  [x] 9  Very light (For a healthy person, it is like walking slowly at his or her own pace for some minutes)  []10  []11  Light  []12  []13  Somewhat hard (exercise, but it still feels OK to continue)  []14  []15  Hard (heavy)  []16  []17  Very hard (can still go on, but really has to push himself. It feels very heavy, and the person is very tired. )[]18  []19  Extremely hard (For most people this is the most strenuous exercise they have ever experienced.)  []20  Maximal exertion. 3) Pt educated in and completed Therapeutic exercise (as indicated below for 1 set) to promote circulation and prevent complications of bedrest with patient verbalizes understanding of employing green zone, yellow zone and red zone to seek provider input and evaluation. Educated patient in :  []Supine    Level 1: Bed Exercise 10-15 reps, 2x/day  []Ankle Pumps  []Heel Slides  []Hip Abduction  []Buttocks Squeeze  []Diaphragmatic Breathing with TA set  []Shoulder Shrugs  []Bicep Curl  []Hands open/close                          [x]Sitting    Level 2: Seated Exercises 10-15 reps, 2x/day  [x]Toe raise/heel raise  [x]Long Arc Quad  [x]Seated March  [x]Seated Clamshell  [x]Diaphragmatic Breathing with TA set and BUE ER and IR  []Shoulder Shrugs  []Bicep Curls  []Hands open/close                         []Standing   Level 3: Standing Exercises 10-15 reps, 2x/day     []Sit to/from stand  []Standing march  []Standing side steps  []Standing bilateral heel raise  []Diaphragmatic breathing with TA set and BUE flex/ext  []Shoulder Shrugs  []Bicep Curls  []Hands open/close                        []WalkingLevel 1: Educated patient in initiating walking when in the Green zone and to Start with 2-5 minutes daily and work up to 10 minutes per day. Walk at a slow, comfortable pace with your exertion level Very Light (RICK 9) to Light (RICK 11)   You should be able to comfortably hold a conversation while walking. 4)Daily Weight check/Stepping on Scale  [x]Pt educated in importance of checking body weight daily and []demonstrate/[x]verbalizes safety in:stepping up to weigh self and complete downward gaze/head nod to read scale on standard scale  and safety stepping off scale. []Barriers to completion of Daily weight check:       5)Pt voices and demonstrates appropriate teach back of Heart Failure Education Program with : use of the   [x]1.  Identifying Appropriate Zone from HF Zone Self-Check Plan                          [x]2. Rating self on RICK. [x]3. Therapeutic exercise/walking program      []4. Importance of taking daily weight measurement             []5. Safely stepping on and off scale    []Pt will benefit from reinforcement of education due to   []Readiness to learn                               []Decreased cognition  []Language barrier                                  []Decreased vision/hearing  []First introduction to new information    [x]Requires intermittent cues  []Other:    Education provided via:  [x]Oral instruction  []Demonstration  [x]Written handout             Therapy Time   Individual Concurrent Group Co-treatment   Time In 1038         Time Out 1118         Minutes 40         Timed Code Treatment Minutes: 40 Minutes     If pt is unable to be seen after this session, please let this note serve as discharge summary. Please see case management note for discharge disposition. Thank you.     Karen Garcia, PT

## 2022-12-13 NOTE — PROGRESS NOTES
ONCOLOGY HEMATOLOGY CARE PROGRESS NOTE      SUBJECTIVE:    The patient states that her pain is under much better control. ROS:     Constitutional:  No weight loss, No fever, No chills, No night sweats. Energy level good.   Eyes:  No impairment or change in vision  ENT / Mouth:  No pain, abnormal ulceration, bleeding, nasal drip or change in voice or hearing  Cardiovascular:  No chest pain, palpitations, new edema, or calf discomfort  Respiratory:  No pain, hemoptysis, change to breathing  Breast:  No pain, discharge, change in appearance or texture  Gastrointestinal:  No pain, cramping, jaundice, change to eating and bowel habits  Urinary:  No pain, bleeding or change in continence  Genitalia: No pain, bleeding or discharge  Musculoskeletal:  No redness, pain, edema or weakness  Skin:  No pruritus, rash, change to nodules or lesions  Neurologic:  No discomfort, change in mental status, speech, sensory or motor activity  Psychiatric:  No change in concentration or change to affect or mood  Endocrine:  No hot flashes, increased thirst, or change to urine production  Hematologic: No petechiae, ecchymosis or bleeding  Lymphatic:  No lymphadenopathy or lymphedema  Allergy / Immunologic:  No eczema, hives, frequent or recurrent infections    OBJECTIVE        Physical    VITALS:  Patient Vitals for the past 24 hrs:   BP Temp Temp src Pulse Resp SpO2   12/13/22 0345 (!) 133/50 99.1 °F (37.3 °C) Oral 54 20 99 %   12/13/22 0145 138/65 98.6 °F (37 °C) Oral 53 18 99 %   12/12/22 1945 (!) 161/65 98.5 °F (36.9 °C) Oral 62 18 97 %   12/12/22 1323 (!) 168/61 97.6 °F (36.4 °C) Oral 54 16 99 %   12/12/22 1145 (!) 167/60 97.6 °F (36.4 °C) Oral 50 16 100 %   12/12/22 0826 (!) 177/63 98.3 °F (36.8 °C) Oral 67 16 100 %       24HR INTAKE/OUTPUT:    Intake/Output Summary (Last 24 hours) at 12/13/2022 0814  Last data filed at 12/12/2022 0837  Gross per 24 hour   Intake 240 ml   Output 500 ml   Net -260 ml       CONSTITUTIONAL: awake, alert, cooperative, no apparent distress   EYES: pupils equal, round and reactive to light, sclera clear and conjunctiva normal  ENT: Normocephalic, without obvious abnormality, atraumatic  NECK: supple, symmetrical, no jugular venous distension and no carotid bruits   HEMATOLOGIC/LYMPHATIC: no cervical, supraclavicular or axillary lymphadenopathy   LUNGS: no increased work of breathing and clear to auscultation   CARDIOVASCULAR: regular rate and rhythm, normal S1 and S2, no murmur noted  ABDOMEN: normal bowel sounds x 4, soft, non-distended, non-tender, no masses palpated, no hepatosplenomgaly   MUSCULOSKELETAL: full range of motion noted, tone is normal  NEUROLOGIC: awake, alert, oriented to name, place and time. Motor skills grossly intact. SKIN: Normal skin color, texture, turgor and no jaundice.  appears intact   EXTREMITIES: no LE edema     DATA:  CBC:    Recent Labs     12/11/22  0445 12/10/22  1606   WBC 4.4 4.8   NEUTROABS 2.0 2.8   LYMPHOPCT 48.5 34.3   RBC 3.49* 3.66*   HGB 10.1* 10.7*   HCT 30.9* 32.5*   MCV 88.4 88.9   MCH 28.8 29.2   MCHC 32.6 32.9   RDW 12.0* 11.7*    156       PT/INR:    Recent Labs     12/11/22  0445 12/10/22  1606   PROT  --  6.6   INR 1.07  --      PTT:    Recent Labs     12/11/22 0445   APTT 24.8       CMP:    Recent Labs     12/12/22  0520 12/11/22  1508 12/11/22  0445 12/10/22  1606   NA  --  135* 138 140   K  --  4.5 4.3 3.9   CL  --  100 104 104   CO2  --  27 27 27   GLUCOSE  --  369* 254* 213*   BUN  --  12 11 13   CREATININE  --  1.1 1.1 0.9   LABGLOM  --  56* 56* >60   CALCIUM  --  8.6 8.7 9.3   PROT  --   --   --  6.6   LABALBU  --   --   --  3.9   AGRATIO  --   --   --  1.4   BILITOT  --   --   --  <0.2   ALKPHOS  --   --   --  191*   ALT  --   --   --  77*   AST  --   --   --  45*   MG 2.00  --  1.70*  --        Lab Results   Component Value Date    CALCIUM 8.6 12/11/2022    PHOS 3.8 03/31/2022       LDH:No results for input(s): LDH in the last 720 hours. Radiology Review:  NM BONE SCAN WHOLE BODY  Narrative: EXAMINATION:  WHOLE BODY BONE SCAN  12/12/2022    TECHNIQUE:  The patient was injected intravenously with 22 mCi of 99 mTc MDP and  scintigraphy of the entire skeleton was performed approximately three hours  later. COMPARISON:  06/02/2021, CT 05/25/2022. HISTORY:  ORDERING SYSTEM PROVIDED HISTORY: breast cancer with likely bone metastasis  TECHNOLOGIST PROVIDED HISTORY:  Reason for exam:->breast cancer with likely bone metastasis    FINDINGS:  Radiotracer accumulation is present in the anterior right iliac bone and in  right superior pubic ramus extending to the pubic symphysis concerning for  metastatic disease. No correlate is noted on the CT from 2021. Physiologic uptake in the calvarium without focal lesion on the MRI of  05/25/2022. Uptake in the sternum and manubrium is likely degenerative. Uptake in the  right distal femur, left patella and both feet is likely degenerative. Solitary left kidney. Impression: Findings suspicious for osseous metastatic disease in the anterior right  iliac bone and right superior pubic ramus. Radiographic correlate  recommended. Problem List  Patient Active Problem List   Diagnosis    Acute hyperglycemia    Hypertension, uncontrolled    Bilateral malignant neoplasm of breast in female ([address]    Microalbuminuria    Obesity (BMI 30-39. 9)    Slurred speech    Hx of ischemic CVA    Brain metastases (HCC)    Carotid stenosis, bilateral:<50%:per US 7/2016    SHARRON (acute kidney injury) ([address]    Lactic acidosis    Frequent falls    Depression/anxiety    Abnormal brain MRI    Acute bilateral low back pain without sciatica    Closed fracture of right ankle, with routine healing, subsequent encounter    Stage 3a chronic kidney disease ([address]    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 ([address]    Dyslipidemia associated with type 2 diabetes mellitus ([address]    Bipolar disorder ([address]    Cardiomegaly    Cardiomyopathy ([address]    Carpal tunnel syndrome    Chronic systolic heart failure (HCC)    Diffuse cystic mastopathy    Edema    Gallstone pancreatitis    Gout    History of breast cancer    History of renal cell carcinoma    Cardiomyopathy in other diseases classified elsewhere    Mononeuritis    Myalgia and myositis    Nonspecific abnormal electrocardiogram (ECG) (EKG)    Patient in clinical research study    Peroneal muscular atrophy    Scoliosis (and kyphoscoliosis), idiopathic    SOBOE (shortness of breath on exertion)    Syncope and collapse    Chronic pain disorder    DDD (degenerative disc disease), lumbar    Diabetic polyneuropathy associated with type 2 diabetes mellitus ([address]    Other secondary scoliosis, lumbosacral region    Thoracic spondylosis without myelopathy    Morbid obesity due to excess calories (HCC)    Age-related nuclear cataract of both eyes    Hypermetropia, bilateral    Hypertensive retinopathy, bilateral    Vitreous degeneration, bilateral    Acute bilateral low back pain with left-sided sciatica    History of CVA (cerebrovascular accident) without residual deficits    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 stage 3 chronic kidney disease (HCC)    S/P mastectomy, right    Acute cystitis without hematuria    Hypomagnesemia    Chest pain    CAD S/P percutaneous coronary angioplasty    Hyperglycemia due to type 2 diabetes mellitus ([address]    Hx of heart artery stent    Nuclear senile cataract    Unintentional weight loss    Chronic anemia    Facial abscess    Asymptomatic bacteriuria    Acute encephalopathy    Tobacco use disorder    Severe malnutrition ([address]    Acute right eye pain    Suspected condition    Bipolar affective disorder, currently depressed, moderate ([address]    Seasonal allergies    Symptomatic bradycardia    Dyspnea       ASSESSMENT AND PLAN:    Breast carcinoma:  -Diagnosis 2014  -T1c, N1, M0  -Tumor size 1.4 cm  -ER positive, NH positive and HER2/edward negative  -Oncotype DX score 8  -Status post lumpectomy and axillary node evaluation.  -Status post radiation  -The patient has not been on Femara since June 2020.  -It is questionable whether she actually has been taking her Femara properly    Back pain:  -CT scan is concerning for bone metastasis  -Bone scan was ordered and demonstrates areas of concern in the pelvis  -Bone biopsy has been performed and pathology is pending  -Interestingly, spine lesions did not show up on the bone scan and these could be lytic lesions which do not show up as well.       ONCOLOGIC DISPOSITION:    -Oncology issues can be worked up as an outpatient    Kalani Jo MD  Please contact through [employer]

## 2022-12-13 NOTE — PROGRESS NOTES
Hospitalist Progress Note      PCP: Irina Conway    Date of Admission: 12/10/2022    Chief Complaint: pain all over       Subjective:  She feels about the same. Medications:  Reviewed    Infusion Medications    sodium chloride      dextrose       Scheduled Medications    [START ON 12/14/2022] enoxaparin  40 mg SubCUTAneous Daily    [START ON 12/14/2022] aspirin  81 mg Oral Daily    [START ON 12/14/2022] ticagrelor  90 mg Oral BID    insulin lispro  0-4 Units SubCUTAneous Nightly    insulin lispro  0-16 Units SubCUTAneous TID WC    insulin glargine  30 Units SubCUTAneous QAM    atorvastatin  20 mg Oral Daily    ferrous sulfate  325 mg Oral Daily with breakfast    Liraglutide  1.8 mg SubCUTAneous Daily    lisinopril  40 mg Oral Daily    paliperidone  9 mg Oral QAM    pantoprazole  40 mg Oral Daily    traZODone  200 mg Oral Nightly    sodium chloride flush  5-40 mL IntraVENous 2 times per day     PRN Meds: acetaminophen, iopamidol, clonazePAM, oxyCODONE-acetaminophen, sodium chloride flush, sodium chloride, ondansetron **OR** ondansetron, polyethylene glycol, acetaminophen **OR** acetaminophen, glucose, dextrose bolus **OR** dextrose bolus, glucagon (rDNA), dextrose      Intake/Output Summary (Last 24 hours) at 12/13/2022 1022  Last data filed at 12/13/2022 0838  Gross per 24 hour   Intake 200 ml   Output --   Net 200 ml       Physical Exam Performed:    BP (!) 148/70   Pulse 54   Temp 99.1 °F (37.3 °C) (Oral)   Resp 20   Ht 5' 3\" (1.6 m)   Wt 113 lb (51.3 kg)   SpO2 99%   BMI 20.02 kg/m²     General appearance: No apparent distress, appears stated age. HEENT: Pupils equal, round, and reactive to light. Conjunctivae/corneas clear. Neck: Supple, with full range of motion. No jugular venous distention. Trachea midline. Respiratory:  Normal respiratory effort. Clear to auscultation, bilaterally without Rales/Wheezes/Rhonchi.   Cardiovascular: Regular rate and rhythm with normal S1/S2 without murmurs, rubs or gallops. Abdomen: Soft, non-tender, non-distended with normal bowel sounds. Musculoskeletal: No clubbing, cyanosis or edema bilaterally. Full range of motion without deformity. Skin: Skin color, texture, turgor normal.  No rashes or lesions. Neurologic:  Neurovascularly intact without any focal sensory/motor deficits. Cranial nerves: II-XII intact, grossly non-focal.  Psychiatric: Alert and oriented, thought content appropriate, normal insight  Capillary Refill: Brisk, 3 seconds, normal   Peripheral Pulses: +2 palpable, equal bilaterally       Labs:   Recent Labs     12/10/22  1606 12/11/22  0445   WBC 4.8 4.4   HGB 10.7* 10.1*   HCT 32.5* 30.9*    151     Recent Labs     12/10/22  1606 12/11/22  0445 12/11/22  1508    138 135*   K 3.9 4.3 4.5    104 100   CO2 27 27 27   BUN 13 11 12   CREATININE 0.9 1.1 1.1   CALCIUM 9.3 8.7 8.6     Recent Labs     12/10/22  1606   AST 45*   ALT 77*   BILITOT <0.2   ALKPHOS 191*     Recent Labs     12/11/22  0445   INR 1.07     Recent Labs     12/10/22  2239 12/11/22  0100 12/11/22  0445   CKTOTAL  --  497*  --    TROPONINI <0.01 <0.01 <0.01       Urinalysis:      Lab Results   Component Value Date/Time    NITRU Negative 12/10/2022 01:42 PM    WBCUA 3-5 12/10/2022 01:42 PM    BACTERIA 2+ 12/10/2022 01:42 PM    RBCUA 5-10 12/10/2022 01:42 PM    BLOODU MODERATE 12/10/2022 01:42 PM    SPECGRAV >=1.030 12/10/2022 01:42 PM    GLUCOSEU >=1000 12/10/2022 01:42 PM    GLUCOSEU >=1000 mg/dL 06/07/2010 03:38 PM       Radiology:  NM BONE SCAN WHOLE BODY   Preliminary Result   Findings suspicious for osseous metastatic disease in the anterior right   iliac bone and right superior pubic ramus. Radiographic correlate   recommended. CT CHEST PULMONARY EMBOLISM W CONTRAST   Final Result   1. No pulmonary embolism identified.       2.  Apparent linear filling defect within the mid descending thoracic aorta   is thought to be artifactual, rather than due to intimal tear/focal   dissection. If there is clinical concern for acute aortic dissection, CTA   chest dissection protocol would be recommended for further evaluation. 3.  Abnormal extensive mixed sclerosis/lucency throughout the spine. Differential considerations include myeloma versus metastases. Clinical   correlation recommended. 4.  Additional nonemergent findings, as above. XR CHEST PORTABLE   Final Result   No acute process. IR FLUORO GUIDED NEEDLE PLACEMENT    (Results Pending)   CT ABDOMEN PELVIS W IV CONTRAST Additional Contrast? None    (Results Pending)       IP CONSULT TO CARDIOTHORACIC SURGERY  IP CONSULT TO HOSPITALIST  IP CONSULT TO HEART FAILURE NURSE/COORDINATOR  IP CONSULT TO DIETITIAN  IP CONSULT TO ONCOLOGY  IP CONSULT TO SPIRITUAL SERVICES    Assessment/Plan:    Active Hospital Problems    Diagnosis     Dyspnea [R06.00]      Priority: Medium    Chronic anemia [D64.9]     Hyperglycemia due to type 2 diabetes mellitus ([facility] [E11.65]     Hypertension, uncontrolled [I10]        Judge Severance is a 59-year-old female with significant past medical history of hypertension, hyperlipidemia, chronic kidney disease, and CAD who presents to [facility] emergency department with a constellation of vague symptoms such as generalized body pains and chronic upper back pain. She is unable to define an onset of these pains, she states back pain has been for many years, and the body aches have been coming and going for \"long time. \"      Lesions on thoracic spine and R pelvic bones (as seen on CT and bone scan). Concerning given her h/o breast cancer (bilateral resection and radiation in 2015, had not been compliant with femara) and R renal cancer (resection and radiation when she was 4years old). Also, patient says she has unintentionally lost 90 lbs in the last year. Oncology ordered a pelvic CT and bone biopsy. Muscular deconditioning.   PT/OT rec'd SNF, but patient might refuse. Aortic dissection ruled out. CT showed artifact per CT surgery. DM2. Insulin regimen. A1c recently 14.9, doubtful compliance at home. HTN. Lisinopril.    CAD. Aspirin, ticagrelor, statin. She is on paliperidone. I am uncertain as to her exact psych diagnosis, she says it is for bipolar disorder. Also trazodone and clonazepam.          DVT Prophylaxis: enoxaparin  Diet: ADULT DIET; Regular; 4 carb choices (60 gm/meal); Low Fat/Low Chol/High Fiber/2 gm Na  ADULT ORAL NUTRITION SUPPLEMENT; Breakfast, Lunch, Dinner; Diabetic Oral Supplement  Code Status: Full Code  PT/OT Eval Status: rec'd SNF    Dispo - likely ready for discharge after biopsy is completed, perhaps 12/13 - 7047 (f/u results as outpatient). She lives at home and might refuse SNF.     Appropriate for A1 Discharge Unit: Aeme Lemos MD

## 2022-12-13 NOTE — ACP (ADVANCE CARE PLANNING)
Advance Care Planning     Advance Care Planning Inpatient Note  [facility] Department    Today's Date: 12/13/2022  Unit: [place] C3 TELE/MED SURG/ONC    Received request from IDT Member. Upon review of chart and communication with care team, patient's decision making abilities are not in question. . Patient was/were present in the room during visit. Goals of ACP Conversation:  Discuss advance care planning documents    Health Care Decision Makers:       Primary Decision Maker: Yehuda Echavarria - [phone]    Secondary Decision Maker: Moi Fried - Brother/Sister - [phone]    Supplemental (Other) Decision Maker: Kerri Clifflis - [phone]  Summary:  Completed [address]    Advance Care Planning Documents (Patient Wishes):  Healthcare Power of /Advance Directive Appointment of [address]   Patient did not want to complete a living will at this time. Assessment:  Patient confirmed her choices for proxy decision making if she is unable to make her own medical decisions. We discussed the importance of having a discussion with those she listed about her wishes to continue attempts at resuscitation even if the situation looks dire. Interventions:  Provided education on documents for clarity and greater understanding  Assisted in the completion of documents according to patient's wishes at this time  Encouraged ongoing ACP conversation with future decision makers and loved ones    Care Preferences Communicated:   Ventilation:   If the patient, in their present state of health, suddenly became very ill and unable to breathe on their own,     the patient would desire the use of a ventilator (breathing machine). If their health worsens and it becomes clear that the change of recovery is unlikely,     the patient would desire the use of a ventilator (breathing machine).     Resuscitation:  In the event the patient's heart stopped as a result of an underlying serious health condition, the patient communicates a preference for      resuscitative attempts (CPR). Outcomes/Plan:  ACP Discussion: Completed  New advance directive completed.     Electronically signed by Nargis Jo, [employer] on 12/13/2022 at 1:11 PM

## 2022-12-13 NOTE — PLAN OF CARE
Problem: Pain  Goal: Verbalizes/displays adequate comfort level or baseline comfort level  Outcome: Progressing  Flowsheets (Taken 12/13/2022 1034)  Verbalizes/displays adequate comfort level or baseline comfort level:   Encourage patient to monitor pain and request assistance   Assess pain using appropriate pain scale   Administer analgesics based on type and severity of pain and evaluate response   Implement non-pharmacological measures as appropriate and evaluate response     Problem: Safety - Adult  Goal: Free from fall injury  Outcome: Progressing  Flowsheets (Taken 12/13/2022 1034)  Free From Fall Injury: Instruct family/caregiver on patient safety     Problem: Skin/Tissue Integrity  Goal: Absence of new skin breakdown  Description: 1. Monitor for areas of redness and/or skin breakdown  2. Assess vascular access sites hourly  3. Every 4-6 hours minimum:  Change oxygen saturation probe site  4. Every 4-6 hours:  If on nasal continuous positive airway pressure, respiratory therapy assess nares and determine need for appliance change or resting period.   Outcome: Progressing

## 2022-12-13 NOTE — PROGRESS NOTES
Reviewed bone biopsy with Dr Opal Bermudez he would like CT abd pelvis with contrast first. Perfect serve sent to Dr Grabiel Medina.  Nurse made aware

## 2022-12-13 NOTE — PROGRESS NOTES
A&Ox4. No complaints of /SOB, chest pain or heaviness. No cyanosis or pallor. No use of accessory muscles whem breathing. Will continue to monitor.

## 2022-12-13 NOTE — CARE COORDINATION
[facility] out of network    Patient was a nonadmit 2021  Was Active with [facility]  Contacted loretta gomez to see if able to accept back; unable to accept out of network with [address]    Referral sent to [facility] awaiting response    [address], [place]  [facility] [phone]

## 2022-12-14 ENCOUNTER — ANESTHESIA EVENT (OUTPATIENT)
Dept: ENDOSCOPY | Age: 62
End: 2022-12-14
Payer: COMMERCIAL

## 2022-12-14 LAB
ALBUMIN SERPL-MCNC: 2.9 G/DL (ref 3.4–5)
ALP BLD-CCNC: 167 U/L (ref 40–129)
ALT SERPL-CCNC: 44 U/L (ref 10–40)
AST SERPL-CCNC: 29 U/L (ref 15–37)
BILIRUB SERPL-MCNC: 0.5 MG/DL (ref 0–1)
BILIRUBIN DIRECT: <0.2 MG/DL (ref 0–0.3)
BILIRUBIN, INDIRECT: ABNORMAL MG/DL (ref 0–1)
CEA: 20.5 NG/ML (ref 0–5)
GLUCOSE BLD-MCNC: 124 MG/DL (ref 70–99)
GLUCOSE BLD-MCNC: 178 MG/DL (ref 70–99)
GLUCOSE BLD-MCNC: 359 MG/DL (ref 70–99)
GLUCOSE BLD-MCNC: 369 MG/DL (ref 70–99)
HCT VFR BLD CALC: 27.7 % (ref 36–48)
HEMOGLOBIN: 8.8 G/DL (ref 12–16)
IGA: 357 MG/DL (ref 70–400)
IGG: 1129 MG/DL (ref 700–1600)
IGM: 61 MG/DL (ref 40–230)
INR BLD: 0.97 (ref 0.87–1.14)
LIPASE: 5 U/L (ref 13–60)
LIPASE: 5 U/L (ref 13–60)
MCH RBC QN AUTO: 28.9 PG (ref 26–34)
MCHC RBC AUTO-ENTMCNC: 31.9 G/DL (ref 31–36)
MCV RBC AUTO: 90.5 FL (ref 80–100)
PDW BLD-RTO: 11.7 % (ref 12.4–15.4)
PERFORMED ON: ABNORMAL
PLATELET # BLD: 150 K/UL (ref 135–450)
PMV BLD AUTO: 9 FL (ref 5–10.5)
PROTHROMBIN TIME: 12.8 SEC (ref 11.7–14.5)
RBC # BLD: 3.06 M/UL (ref 4–5.2)
TOTAL PROTEIN: 5.7 G/DL (ref 6.4–8.2)
WBC # BLD: 4.7 K/UL (ref 4–11)

## 2022-12-14 PROCEDURE — 2580000003 HC RX 258: Performed by: NURSE PRACTITIONER

## 2022-12-14 PROCEDURE — 6370000000 HC RX 637 (ALT 250 FOR IP): Performed by: NURSE PRACTITIONER

## 2022-12-14 PROCEDURE — 85027 COMPLETE CBC AUTOMATED: CPT

## 2022-12-14 PROCEDURE — 86301 IMMUNOASSAY TUMOR CA 19-9: CPT

## 2022-12-14 PROCEDURE — 84155 ASSAY OF PROTEIN SERUM: CPT

## 2022-12-14 PROCEDURE — 1200000000 HC SEMI PRIVATE

## 2022-12-14 PROCEDURE — 83690 ASSAY OF LIPASE: CPT

## 2022-12-14 PROCEDURE — 82784 ASSAY IGA/IGD/IGG/IGM EACH: CPT

## 2022-12-14 PROCEDURE — 85610 PROTHROMBIN TIME: CPT

## 2022-12-14 PROCEDURE — 84165 PROTEIN E-PHORESIS SERUM: CPT

## 2022-12-14 PROCEDURE — 97530 THERAPEUTIC ACTIVITIES: CPT

## 2022-12-14 PROCEDURE — 83883 ASSAY NEPHELOMETRY NOT SPEC: CPT

## 2022-12-14 PROCEDURE — 6370000000 HC RX 637 (ALT 250 FOR IP): Performed by: INTERNAL MEDICINE

## 2022-12-14 PROCEDURE — 82378 CARCINOEMBRYONIC ANTIGEN: CPT

## 2022-12-14 PROCEDURE — 80076 HEPATIC FUNCTION PANEL: CPT

## 2022-12-14 PROCEDURE — 36415 COLL VENOUS BLD VENIPUNCTURE: CPT

## 2022-12-14 PROCEDURE — 97535 SELF CARE MNGMENT TRAINING: CPT

## 2022-12-14 RX ORDER — 0.9 % SODIUM CHLORIDE 0.9 %
500 INTRAVENOUS SOLUTION INTRAVENOUS ONCE
Status: DISCONTINUED | OUTPATIENT
Start: 2022-12-14 | End: 2022-12-16 | Stop reason: HOSPADM

## 2022-12-14 RX ADMIN — LISINOPRIL 40 MG: 20 TABLET ORAL at 08:57

## 2022-12-14 RX ADMIN — PANTOPRAZOLE SODIUM 40 MG: 40 TABLET, DELAYED RELEASE ORAL at 08:57

## 2022-12-14 RX ADMIN — INSULIN GLARGINE 30 UNITS: 100 INJECTION, SOLUTION SUBCUTANEOUS at 09:07

## 2022-12-14 RX ADMIN — OXYCODONE AND ACETAMINOPHEN 1 TABLET: 7.5; 325 TABLET ORAL at 08:57

## 2022-12-14 RX ADMIN — INSULIN LISPRO 16 UNITS: 100 INJECTION, SOLUTION INTRAVENOUS; SUBCUTANEOUS at 16:45

## 2022-12-14 RX ADMIN — PALIPERIDONE 9 MG: 3 TABLET, EXTENDED RELEASE ORAL at 08:57

## 2022-12-14 RX ADMIN — INSULIN LISPRO 4 UNITS: 100 INJECTION, SOLUTION INTRAVENOUS; SUBCUTANEOUS at 20:25

## 2022-12-14 RX ADMIN — SODIUM CHLORIDE, PRESERVATIVE FREE 10 ML: 5 INJECTION INTRAVENOUS at 08:58

## 2022-12-14 RX ADMIN — SODIUM CHLORIDE, PRESERVATIVE FREE 10 ML: 5 INJECTION INTRAVENOUS at 20:24

## 2022-12-14 RX ADMIN — ATORVASTATIN CALCIUM 20 MG: 10 TABLET, FILM COATED ORAL at 08:57

## 2022-12-14 RX ADMIN — TRAZODONE HYDROCHLORIDE 200 MG: 50 TABLET ORAL at 20:24

## 2022-12-14 RX ADMIN — OXYCODONE AND ACETAMINOPHEN 1 TABLET: 7.5; 325 TABLET ORAL at 23:13

## 2022-12-14 RX ADMIN — FERROUS SULFATE TAB 325 MG (65 MG ELEMENTAL FE) 325 MG: 325 (65 FE) TAB at 08:57

## 2022-12-14 RX ADMIN — CLONAZEPAM 0.5 MG: 0.5 TABLET ORAL at 15:30

## 2022-12-14 ASSESSMENT — PAIN SCALES - GENERAL
PAINLEVEL_OUTOF10: 0
PAINLEVEL_OUTOF10: 8
PAINLEVEL_OUTOF10: 9

## 2022-12-14 ASSESSMENT — PAIN DESCRIPTION - LOCATION
LOCATION: HEAD;BACK
LOCATION: GENERALIZED
LOCATION: HEAD;BACK;GENERALIZED

## 2022-12-14 ASSESSMENT — PAIN DESCRIPTION - DESCRIPTORS: DESCRIPTORS: ACHING

## 2022-12-14 NOTE — PROGRESS NOTES
Hospitalist Progress Note      PCP: Chen Amezcua    Date of Admission: 12/10/2022    Chief Complaint: pain all over       Subjective:  She feels about the same. Medications:  Reviewed    Infusion Medications    sodium chloride      dextrose       Scheduled Medications    sodium chloride  500 mL IntraVENous Once    enoxaparin  40 mg SubCUTAneous Daily    aspirin  81 mg Oral Daily    ticagrelor  90 mg Oral BID    insulin lispro  0-4 Units SubCUTAneous Nightly    insulin lispro  0-16 Units SubCUTAneous TID WC    insulin glargine  30 Units SubCUTAneous QAM    atorvastatin  20 mg Oral Daily    Liraglutide  1.8 mg SubCUTAneous Daily    lisinopril  40 mg Oral Daily    paliperidone  9 mg Oral QAM    pantoprazole  40 mg Oral Daily    traZODone  200 mg Oral Nightly    sodium chloride flush  5-40 mL IntraVENous 2 times per day     PRN Meds: acetaminophen, clonazePAM, oxyCODONE-acetaminophen, sodium chloride flush, sodium chloride, ondansetron **OR** ondansetron, polyethylene glycol, acetaminophen **OR** acetaminophen, glucose, dextrose bolus **OR** dextrose bolus, glucagon (rDNA), dextrose      Intake/Output Summary (Last 24 hours) at 12/14/2022 1131  Last data filed at 12/13/2022 1300  Gross per 24 hour   Intake 140 ml   Output --   Net 140 ml       Physical Exam Performed:    /64   Pulse (!) 49   Temp 98.6 °F (37 °C) (Oral)   Resp 18   Ht 5' 3\" (1.6 m)   Wt 113 lb (51.3 kg)   SpO2 100%   BMI 20.02 kg/m²     General appearance: No apparent distress, appears stated age. HEENT: Pupils equal, round, and reactive to light. Conjunctivae/corneas clear. Neck: Supple, with full range of motion. No jugular venous distention. Trachea midline. Respiratory:  Normal respiratory effort. Clear to auscultation, bilaterally without Rales/Wheezes/Rhonchi. Cardiovascular: Regular rate and rhythm with normal S1/S2 without murmurs, rubs or gallops.   Abdomen: Soft, non-tender, non-distended with normal bowel sounds. Musculoskeletal: No clubbing, cyanosis or edema bilaterally. Full range of motion without deformity. Skin: Skin color, texture, turgor normal.  No rashes or lesions. Neurologic:  Neurovascularly intact without any focal sensory/motor deficits. Cranial nerves: II-XII intact, grossly non-focal.  Psychiatric: Alert and oriented, thought content appropriate, normal insight  Capillary Refill: Brisk, 3 seconds, normal   Peripheral Pulses: +2 palpable, equal bilaterally       Labs:   Recent Labs     12/14/22 0512   WBC 4.7   HGB 8.8*   HCT 27.7*        Recent Labs     12/11/22  1508 12/13/22  1005   *  --    K 4.5  --      --    CO2 27  --    BUN 12 14   CREATININE 1.1 1.1   CALCIUM 8.6  --      Recent Labs     12/14/22 0512   AST 29   ALT 44*   BILIDIR <0.2   BILITOT 0.5   ALKPHOS 167*     Recent Labs     12/14/22 0512   INR 0.97     No results for input(s): Estefania Lopez in the last 72 hours. Urinalysis:      Lab Results   Component Value Date/Time    NITRU Negative 12/10/2022 01:42 PM    WBCUA 3-5 12/10/2022 01:42 PM    BACTERIA 2+ 12/10/2022 01:42 PM    RBCUA 5-10 12/10/2022 01:42 PM    BLOODU MODERATE 12/10/2022 01:42 PM    SPECGRAV >=1.030 12/10/2022 01:42 PM    GLUCOSEU >=1000 12/10/2022 01:42 PM    GLUCOSEU >=1000 mg/dL 06/07/2010 03:38 PM       Radiology:  CT ABDOMEN PELVIS W IV CONTRAST Additional Contrast? None   Final Result   Questionable asymmetric sclerosis of the anterior portion of the right iliac   wing compared to the left. This could correspond to the findings on bone   scan. However the finding is equivocal.  Consider noncontrast pelvic MRI for   further characterization given the equivocal findings on CT scan compared to   the bone scan      Mild pancreatic ductal dilatation and intrahepatic biliary ductal dilatation,   presumably secondary to chronic calcific pancreatitis with stones in the   pancreatic duct and possibly in the distal common duct. history of hypertension, hyperlipidemia, chronic kidney disease, and CAD who presents to [facility] emergency department with a constellation of vague symptoms such as generalized body pains and chronic upper back pain. She is unable to define an onset of these pains, she states back pain has been for many years, and the body aches have been coming and going for \"long time. \"      Lesions on thoracic spine and R pelvic bones. Concerning given her h/o breast cancer (bilateral resection and radiation in 2015, had not been compliant with femara). Also had R renal cancer (resection and radiation when she was 4years old). Also, the patient unintentionally lost 90 lbs in the last few years, but this might have been due to her previous diabetic medications and weight has been stable since 2/2022. Oncology ordered a bone biopsy, but first IR requested a pelvic CT, which then lead to an MRI of her T+L spine and pelvis, also an MRCP. Possible stones in the pancreatic duct on CT. GI consulted. EGD 12/15. F/u MRCP, Ca 19-9. Muscular deconditioning. PT/OT rec'd SNF, but patient might refuse. Aortic dissection ruled out. CT showed artifact per CT surgery. DM2. Insulin regimen. A1c recently 14.9, doubtful compliance at home. HTN. Lisinopril.    CAD. Aspirin, ticagrelor, statin. She is on paliperidone. I am uncertain as to her exact psych diagnosis, she says it is for bipolar disorder. Also trazodone and clonazepam.          DVT Prophylaxis: enoxaparin  Diet: Diet NPO Exceptions are: Sips of Water with Meds  ADULT DIET; Regular; 4 carb choices (60 gm/meal); Low Fat/Low Chol/High Fiber/2 gm Na  ADULT ORAL NUTRITION SUPPLEMENT; Breakfast, Lunch, Dinner; Diabetic Oral Supplement  Code Status: Full Code  PT/OT Eval Status: rec'd SNF vs home OT    Dispo - She needs MRI's, MRCP, and EGD.   After that she can discharge when either the biopsy is either completed (f/u results as outpatient) or perhaps when the biopsy is no longer planned. Maybe 12/16? She lives at home and is considering SNF.     Appropriate for A1 Discharge Unit: No      Lisbet Diallo MD

## 2022-12-14 NOTE — ADT AUTH CERT
Subscriber Details  Hospital Account [de-identified]  [facility] Subscriber Name/Sex/Relation Subscriber  Subscriber Address/Phone Subscriber Emp/Emp Phone   2. [address] [facility]   [id number] Shea Ferguson - Female   (Self) 1960 [facility] [address]   [address] [place] [address]   [phone](N) DISABLED      Utilization Reviews       pa recs inpt by Elder Brock RN       Review Status Review Entered   In Primary 2022 1134       Created By   Elder Brock RN      Criteria Review   obs list upgrade  We recommend that the following pt's current hospitalization under OBSERVATION status is upgraded to INPATIENT; if you agree, please place a new ADMIT order in [facility] as recommended. .       Name: Magy Freeman   : 1960   CSN: [id number]   INSURANCE: [address] [facility] Problems    Diagnosis    · Dyspnea [R06.00]        Priority: Medium  · Chronic anemia [D64.9]    · Hyperglycemia due to type 2 diabetes mellitus ([facility] [address] [E11.65]    · Hypertension, uncontrolled [I10]          Susan Penny is a 58-year-old female with significant past medical history of hypertension, hyperlipidemia, chronic kidney disease, and CAD who presents to [facility] emergency department with a constellation of vague symptoms such as generalized body pains and chronic upper back pain. She is unable to define an onset of these pains, she states back pain has been for many years, and the body aches have been coming and going for \"long time. \"        Lesions on thoracic spine. Concerning given her h/o breast cancer (bilateral resection and radiation in 2015) and renal cancer (R resection and radiation when she was 4years old). Also, patient says she has unintentionally lost 90 lbs in the last year. Oncology consulted. Muscular deconditioning. PT/OT rec'd SNF, but patient might refuse. Aortic dissection ruled out.  CT showed artifact per CT surgery. DM2. Insulin regimen. A1c recently 14.9, doubtful compliance at home. HTN. Lisinopril.     CAD. Aspirin, ticagrelor, statin. She is on paliperidone. I am uncertain as to her exact psych diagnosis. Also trazodone and clonazepam.     Vitals hypertensive  Labs and Imaging reviewed  MCG criteria applies yes,   Comments Rec upgrade to inpt      This chart was reviewed at 8:42 AM 12/13/2022    David Perrin MD   [address] [facility]   CELL : [phone]        Musculoskeletal Disease 8[address] [facility] [place] Emergency Department Day 2 (12/11/2022) by Shen Ward RN       Review Status Review Entered   Completed 12/12/2022 0951       Created By   Shen Ward RN      Criteria Review      Care Day: 2 Care Date: 12/11/2022 Level of Care: Inpatient Floor    Guideline Day 1    Clinical Status    ( ) * Clinical Indications met    Interventions    (X) Inpatient interventions as needed    12/12/2022 9:51 AM EST by Emeka Mccray      see additional notes    * Milestone   Additional Notes   DATE: 12/11/22         PERTINENT UPDATES:   Iv mag given 1000mg x 1 for low mag   Insulin adjustments made - see md plans below         VITALS:   T - 98.4   R - 16   P - 61   /69      98% on RA      ABNL/PERTINENT LABS/RADIOLOGY/DIAGNOSTIC STUDIES:   Mag 1.7         BS range 127-416   Troponin 0.010 x 2         PHYSICAL EXAM:   General appearance: No apparent distress, appears stated age and cooperative. HEENT: Pupils equal, roundConjunctivae/corneas clear. Neck: Supple, with full range of motion. No jugular venous distention. Trachea midline. Respiratory:  Normal respiratory effort. Clear to auscultation, bilaterally without Rales/Wheezes/Rhonchi. Cardiovascular: Regular rate and rhythm with normal S1/S2 without murmurs, rubs or gallops. Abdomen: Soft, non-tender, non-distended with normal bowel sounds. Musculoskeletal: No clubbing, cyanosis or edema bilaterally.      Skin: Warm and dry Neurologic: Alert, speech clear with no overt facial droop   Psychiatric: Alert and oriented, not anxious appearing      MD CONSULTS/ASSESSMENT AND PLAN:   IM:   Generalized Weakness with body aches   - rapid flu and covid tests negative    - check CK, TSH. PT/ OT consult        Abnormal CT:    - CTPA showed possible aortic irregularity. CT surgery consulted in ER  and thought it is likely liner artifact in mid dissecting aorta with aortic dissection ruled out. No further eval needed. Hyperglycemia/Type 2 DM   - Last A1c 9/2022 > 12   -Hypoglycemic last night. Blood glucose elevated today in 400-500's. Got 22 units of Lantus this AM.  On low dose SSI which was switched to high dose. On Victoza daily at home but not available here per pharmacist -family to bring home supply if possible. Increase Lantus to 30 units  daily for now and to give 16 units Humalog per sliding scale  and repeat blood sugar. Check stat BMP to ensure not in DKA. Monitor and adjust insulin doses as needed   -Continue to hold Actos and Amaryl       Hypertension   - Continue home antihypertensives       CAD   - No active CP   - EKG unchanged, troponin negative   - Continue DAPT       Chronic Anemia   - Continue Fe supplementation           Hypomagnesemia: Mag 1.7, replace IV                    DVT Prophylaxis: Lovenox           Diet: ADULT DIET; Regular; 4 carb choices (60 gm/meal); Low Fat/Low Chol/High Fiber/2 gm Na   Code Status: Full Code   PT/OT Eval Status: Ordered       Dispo -pending clinical course, 1-3 days.         MEDICATIONS:   Sc lovenox 40mg daily   Home meds         PRN   Po klonopin 0.5mg x 1   Po percocet 7.5/325mg  1 tab x 2         PT/OT/SLP/CM ASSESSMENT OR NOTES:   PT    Ampac score 14   General Plan: 3-5 times per week   Specific Instructions for Next Treatment: progress mobility as tolerated   Current Treatment Recommendations: Strengthening, Balance training, Gait training, Functional mobility training, Stair training, Neuromuscular re-education, Transfer training, Endurance training, Equipment evaluation, education, & procurement, Patient/Caregiver education & training, Safety education & training, Therapeutic activities, Home exercise program              Musculoskeletal Disease [address] Day 1 (12/10/2022) by Dm Kam RN       Review Status Review Entered   Completed 12/12/2022 0928       Created By   Dm Kam RN      Criteria Review      Care Day: 1 Care Date: 12/10/2022 Level of Care: Inpatient Floor    Guideline Day 1    Clinical Status    ( ) * Clinical Indications met    Interventions    (X) Inpatient interventions as needed    12/12/2022 9:28 AM EST by Garrick Nielsen      see additional notes    * Milestone   Additional Notes   DATE:   12/10/22         CHIEF COMPLAINT    See clinical indications         PMH:     Partially empty sella and minimal chronic small vessel ischemic disease   ·   · Arthritis       back   · Bipolar disorder ([address]   · CAD (coronary artery disease)       stent placed 6/8/20   · Cancer ([facility] [address] 2015     bilateral breast:s/p lumpectomy/radiation:   · Carotid stenosis, bilateral:<50%:per US 7/2016    ·   · Coronary artery disease of native artery of native heart with stable angina pectoris   · DDD (degenerative disc disease), lumbar    · Depression/anxiety     · Diabetes mellitus ([address]     · History of therapeutic radiation     · Hyperlipidemia     · Hypertension     · 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 Appearance: She is not diaphoretic. HENT:       Head: Normocephalic. Cardiovascular:       Rate and Rhythm: Normal rate and regular rhythm. Pulses: Normal pulses. Heart sounds: Normal heart sounds. Pulmonary:       Effort: Pulmonary effort is normal.       Breath sounds: Normal breath sounds. Abdominal:       General: Abdomen is flat. Palpations: Abdomen is soft. Musculoskeletal:       Cervical back: Neck supple. Skin:      General: Skin is warm. Neurological:       General: No focal deficit present. Mental Status: She is alert and oriented to person, place, and time. Psychiatric:          Mood and Affect: Mood normal.          Behavior: Behavior normal.       LABS/RADIOLOGY/TESTING/PROCEDURES:   CT CHEST PULMONARY EMBOLISM W CONTRAST   Final Result   1. No pulmonary embolism identified. 2.  Apparent linear filling defect within the mid descending thoracic aorta   is thought to be artifactual, rather than due to intimal tear/focal   dissection. If there is clinical concern for acute aortic dissection, CTA   chest dissection protocol would be recommended for further evaluation. 3.  Abnormal extensive mixed sclerosis/lucency throughout the spine. Differential considerations include myeloma versus metastases. Clinical   correlation recommended. 4.  Additional nonemergent findings, as above. XR CHEST PORTABLE   Final Result   No acute process.           Labs Reviewed   CBC WITH AUTO DIFFERENTIAL - Abnormal; Notable for the following components:       Result Value      RBC 3.66 (*)       Hemoglobin 10.7 (*)       Hematocrit 32.5 (*)       RDW 11.7 (*)       All other components within normal limits   COMPREHENSIVE METABOLIC PANEL - Abnormal; Notable for the following components:     Glucose 213 (*)       Alkaline Phosphatase 191 (*)       ALT 77 (*)       AST 45 (*)       All other components within normal limits   BRAIN NATRIURETIC PEPTIDE - Abnormal; Notable for the following components:     Pro- (*)       All other components within normal limits   D-DIMER, QUANTITATIVE - Abnormal; Notable for the following components:     D-Dimer, Quant 1.36 (*)       All other components within normal limits   BLOOD GAS, VENOUS - Abnormal; Notable for the following components:     pH, Kyle 7.346 (*)       pCO2, Kyle 51.2 (*)       Carboxyhemoglobin 2.1 (*)       All other components within normal limits   URINALYSIS WITH REFLEX TO CULTURE - Abnormal; Notable for the following components:     Glucose, Ur >=1000 (*)       Blood, Urine MODERATE (*)       Protein,  (*)       All other components within normal limits   MICROSCOPIC URINALYSIS - Abnormal; Notable for the following components:     RBC, UA 5-10 (*)       Epithelial Cells, UA 11-20 (*)       Bacteria, UA 2+ (*)       All other components within normal limits   POCT GLUCOSE - Abnormal; Notable for the following components:     POC Glucose 254 (*)       All other components within normal limits         Sinus bradycardiaOtherwise normal ECGWhen compared with ECG of 30-JUN-2022 19:29,Vent. rate has decreased BY  40       ED TX and RESPONSE:   Po tylenol 650mg x 1    Po advil 400mg x 1    Iv labetalol 10mg x 1            Patient's lab results came back with elevated D-dimer with normal lactic acid indicating possible PE. Work-up will be done in order to rule out for possible pulmonary embolism   CT PE did not indicate concern for PE however it did indicate concern for possible aortic dissection. Cardiothoracic surgery was consulted at this time. Patient was started on labetalol to lower blood pressure due to concerns of possible aortic dissection, with cardiothoracic recommendations to lower blood pressure to systolic less than 488. Cause of elevated D-dimer not identified, possibly DVT.   We will admit patient to hospital floors for further workup for DVT and rule out possible aortic dissection. ADMISSION DX:   Chest pain   Generalized body pain   Abnormal CT               MD PLAN/H/P   IM:       PLAN:       Generalized Weakness   - Place in observation   - CTPA should possible aortic irregularity; CT surgery does not agree       Hyperglycemia/Type 2 DM   - Last A1c 9/2022 > 12   - Continue insulin therapies, Victoza   - Hold Actos and Amaryl       Hypertension   - Continue home antihypertensives       CAD   - No active CP   - EKG unchanged, troponin negative   - Continue DAPT       Chronic Anemia   - Continue Fe supplementation           DVT Prophylaxis: Lovenox PPx   SRMB Prophylaxis: Protonix   Diet: ADULT DIET; Regular; 4 carb choices (60 gm/meal); Low Fat/Low Chol/High Fiber/2 gm Na   Code Status: Full Code       PT/OT Eval Status: N/A       Dispo - 1-2 days per clinical improvement                     ADDITIONAL CONSULTS:   CVTS       Paged regarding possible aortic dissection. Discussed with family med resident in ED. 61 yo female, aches, chest pain. CT abd, actually chest, concern for ao dissection, location not specified. Inquired about bp, in the 150s, suggested esmolol or cardene to achieve sbp<120. Cr 0.9. Repeat study with contrast timed for dissection protocol. I have since reviewed the images and agree with the radiologist's interpretation - linear artifact in mid-descending aorta. I do not see any other indication of dissection - periao hematoma, effusion, etc.   sbp on admission actually 179. No need for further CT surgery input. Spoke to hospitalist admitting the pt who already has appropriate plan in place.                 ADDITIONAL MEDS/ORDERS:   Resume home meds   Po percocet 7.5/325mg  1 tab x 1                             Musculoskeletal Disease GRG - Clinical Indications for Admission to Inpatient Care by Janelle Garvin RN       Review Status Review Entered   Completed 12/12/2022 7682       Created By   Janelle Garvin RN Criteria Review      Clinical Indications for Admission to Inpatient Care    Most Recent : Eric Cuenca Most Recent Date: 12/12/2022 9:28 AM EST     (X) Other Indication: Generalized Body Aches      Entered 12/12/2022 9:28 AM EST by Hussain Helga is a 60 y.o. female who presents to the emergency department due to generalized body aches. Patient is a 76-year-old female who is presenting to the emergency department due to generalized body aches. She states that these aches have been going on for the past 2 days. She rates the aches 8/10. She does not recall having any fever at home or chills. She states that she has not experienced any additional flulike symptoms. She also states that starting yesterday she has also been experiencing some chest pain. The history is provided by the patient.

## 2022-12-14 NOTE — CONSULTS
Kati Lechuga is a 60 y.o. female asked to see us in consultation by  Christina Callahan MD for evaluation of weight loss, possible pancreatic duct stones. Ms. Jesus Cornelius is a 60year old female with past medical history of DM, CKD, HTN, Wilm's tumor s/p nephrectomy, breast cancer 2014 s/p radiation and lumpectomy, CAD on Brilinta, depression, anxiety, and chronic anemia. She presented to the ER last week with generalized body aches, weakness and chest pain that has been intermittent for several years. Work-up included a CT PE protocol that disproved evidence of pulmonary emboli but showed a linear defect in the mid descending thoracic aorta. She was evaluated by CT surgery who did not feel this was consistent with aortic dissection. She was admitted for observation. CT of the chest also reported abnormal sclerosis/lucency throughout the spine. As she had reports of weight loss and concern for mets oncology was consulted. Bone scan raised concern for metastatic disease in the pelvis. Bone biopsy pending. Subsequent CT AP continued to show findings that correlate to prior imaging studies as well as mild pancreatic ductal dilation and intrahepatic biliary ductal dilation with calcified pancreatitis and stones in the pancreatic duct, as well as possibly the distal CBD. We have been consulted to evaluate. The patient does have a history of pancreatitis. CBD dilation is a chronic finding and has been noted on prior imaging studies including an MRCP in 2016 which was obtained for abnormal LFTs. 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 From 9102-2372 CT imaging showed mild increase in pancreatic ductal dilation. Her bilirubin is normal, alk phos and ALT chronically elevated. She is chronically anemic as well.   She was worked up with EGD and colonoscopy in 2021. Upper endoscopy was normal with unremarkable duodenal biopsies. Colonoscopy was incomplete due to poor prep. A CT A/P was obtained following that showed no evidence of active bleeding. The patient denies abdominal pain at present. No rectal bleeding, melena or hematemesis. She does report diarrhea over the past year, sometimes with incontinence. In regards to her weight loss, I reviewed her chart. It appears she did drop from 200 lbs to 113 pounds over the course of 2021. As of February 2022 her weight has maintained at 113 pounds. She says that her endocrinologist had stopped her diabetic medications in 2021 due to weight loss. Medications Prior to Admission: Liraglutide (VICTOZA) 18 MG/3ML SOPN SC injection, INJECT 1.8 MG UNDER THE SKIN ONCE DAILY  insulin lispro, 1 Unit Dial, (HUMALOG/ADMELOG) 100 UNIT/ML SOPN, With meals                   - at bedtime   < 150 ---   5 units            0 units 151-200 -- 6 units            0 units 201-250 :  7 units            1 units 251-300:   8 units            2 units 301-350:   9 units            3 units >350 :       10 units          4 units Upto 30 units/day  glimepiride (AMARYL) 4 MG tablet, One tab daily with breakfast  insulin glargine (LANTUS SOLOSTAR) 100 UNIT/ML injection pen, Inject 22 Units into the skin every morning (Patient taking differently: Inject 26 Units into the skin every morning)  pioglitazone (ACTOS) 30 MG tablet, Take 30 mg by mouth daily  lisinopril (PRINIVIL;ZESTRIL) 40 MG tablet, TAKE 1 TABLET BY MOUTH EVERY DAY  atorvastatin (LIPITOR) 20 MG tablet, TAKE 1 TABLET BY MOUTH EVERY DAY  blood glucose test strips (TRUE METRIX BLOOD GLUCOSE TEST) strip, As needed.  (Patient taking differently: 3 each daily)  Insulin Pen Needle 32G X 4 MM MISC, 1 each by Does not apply route daily  Blood Glucose Monitoring Suppl (TRUE METRIX METER) w/Device KIT, Used to  check blood sugar 3 times eyad  ticagrelor (BRILINTA) 90 MG TABS tablet, TAKE 1 TABLET BY MOUTH TWICE A DAY  nitroGLYCERIN (NITROSTAT) 0.4 MG SL tablet, up to max of 3 total doses. If no relief after 1 dose, call 911. gabapentin (NEURONTIN) 600 MG tablet, Take 0.5 tablets by mouth 2 times daily for 3 days. (Patient taking differently: Take 600 mg by mouth 3 times daily. )  paliperidone (INVEGA) 9 MG extended release tablet, Take 9 mg by mouth every morning  pantoprazole (PROTONIX) 40 MG tablet, Take 40 mg by mouth daily   ferrous sulfate (IRON 325) 325 (65 Fe) MG tablet, Take 325 mg by mouth daily (with breakfast)  oxyCODONE-acetaminophen (PERCOCET) 7.5-325 MG per tablet, TAKE 1 TABLET BY MOUTH EVERY 6 (SIX) HOURS AS NEEDED FOR UP TO 28 DAYS. clonazePAM (KLONOPIN) 0.5 MG tablet, Take 0.5 mg by mouth 3 times daily as needed. calcium carbonate-vitamin D (CALTRATE) 600-400 MG-UNIT TABS per tab, Take 1 tablet by mouth daily   aspirin 81 MG tablet, Take 81 mg by mouth daily  traZODone (DESYREL) 100 MG tablet, Take 200 mg by mouth nightly     Medication:   enoxaparin  40 mg SubCUTAneous Daily    aspirin  81 mg Oral Daily    ticagrelor  90 mg Oral BID    insulin lispro  0-4 Units SubCUTAneous Nightly    insulin lispro  0-16 Units SubCUTAneous TID WC    insulin glargine  30 Units SubCUTAneous QAM    atorvastatin  20 mg Oral Daily    ferrous sulfate  325 mg Oral Daily with breakfast    Liraglutide  1.8 mg SubCUTAneous Daily    lisinopril  40 mg Oral Daily    paliperidone  9 mg Oral QAM    pantoprazole  40 mg Oral Daily    traZODone  200 mg Oral Nightly    sodium chloride flush  5-40 mL IntraVENous 2 times per day      sodium chloride      dextrose         Allergies:   Allergies   Allergen Reactions    Morphine Anaphylaxis and Hives     feels like throat is closing    Penicillins Hives and Swelling    Codeine Hives and Rash    Penicillin G Rash       Immunizations:  Immunization History   Administered Date(s) Administered    COVID-19, MODERNA BLUE border, Primary or Immunocompromised, (age 12y+), IM, 100 mcg/0.5mL 05/04/2021, 06/01/2021    Influenza, FLUARIX, FLULAVAL, FLUZONE (age 10 mo+) AND AFLURIA, (age 1 y+), PF, 0.5mL 10/11/2016, 01/23/2020    Influenza, FLUCELVAX, (age 10 mo+), MDCK, PF, 0.5mL 09/21/2017    Tdap (Boostrix, Adacel) 10/07/2020       Family history, past medical history, and  social  history  are  reviewed as  below. Past Medical  History:  Past Medical History:   Diagnosis Date    Abnormal brain MRI 7/20/2017    Partially empty sella and minimal chronic small vessel ischemic disease    Acute bilateral low back pain without sciatica 11/2/2016    SHARRON (acute kidney injury) ([address] 7/5/2017    Arthritis     back    Bipolar disorder ([address] 10/18/2008    CAD (coronary artery disease)     stent placed 6/8/20    Cancer ([address] 2015    bilateral breast:s/p lumpectomy/radiation:under care care of breast specialist:Dr. Boone     Carotid stenosis, bilateral:<50%:per US 7/2016 7/15/2016    Carpal tunnel syndrome 10/18/2008    Cervical cancer screening 2014    Nml per pt'. Coronary artery disease of native artery of native heart with stable angina pectoris ([address] 6/9/2020    DDD (degenerative disc disease), lumbar 7/18/2018    Depression     under care of pschiatrist:Dr. 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 myelopathy or radiculopathy 3/10/2017    Transient cerebral ischemia 07/15/2016    TIA:7/10/16    Unspecified cerebral artery occlusion with cerebral infarction     TIA       Past  Surgical History:  Past Surgical History:   Procedure Laterality Date    BREAST LUMPECTOMY      Bilateral:breast cancer    CARDIAC CATHETERIZATION  2020    Dr. Eliza Waldron), DAYANA to Diag 1    COLONOSCOPY N/A 2021    COLONOSCOPY DIAGNOSTIC performed by Camille Reyes MD at [facility] BONE MARROW BIOPSY  2/3/2021    CT BONE MARROW BIOPSY 2/3/2021 Darrin Lechuga MD [facility] CT SCAN    HYSTERECTOMY (CERVIX STATUS UNKNOWN)      Benign:no cervical cancer per pt'    KIDNEY REMOVAL      right    OTHER SURGICAL HISTORY Right     orif right ankle    TEMPORAL ARTERY BIOPSY Right 2021    RIGHT TEMPORAL ARTERY BIOPSY LIGATION performed by Maite Giraldo MD at [address] [place] [address] N/A 2021    EGD BIOPSY performed by Camille Reyes MD at [address] [place] [address]       Family  History:  Family History   Problem Relation Age of Onset    Cancer Mother         breast    Cancer Father     Heart Failure Neg Hx     High Cholesterol Neg Hx     Hypertension Neg Hx     Migraines Neg Hx     Rashes/Skin Problems Neg Hx     Seizures Neg Hx     Stroke Neg Hx     Thyroid Disease Neg Hx     Diabetes Neg Hx        Social History:  Social History     Tobacco Use    Smoking status: Former     Packs/day: 0.50     Years: 20.00     Pack years: 10.00     Types: Cigarettes, Cigars     Quit date: 7/3/2014     Years since quittin.4    Smokeless tobacco: Never   Vaping Use    Vaping Use: Never used   Substance Use Topics    Alcohol use: No     Alcohol/week: 0.0 standard drinks    Drug use: No       ROS  Constitutional:  Denies fever,sweats, chills +  weight loss, weakness, generalized aches  Eyes:  Denies change in visual acuity   HENT:  Denies hearing loss or dizziness  Respiratory:  Denies cough or shortness of breath   Cardiovascular:  Denies edema + chest pain  :  Denies dysuria, hematuria, urgency or frequency  Musculoskeletal:  +  back pain or joint pain   Integument:  Denies rash   Neurologic:  Denies headache, previous stroke, TIA, confusion   Endocrine:  Denies polyuria or polydipsia   Lymphatic:  Denies swollen glands   Psychiatric:  +  depression or anxiety   Hematologic: +  previous anemia or easy bruising    All other review of systems negative, except for those noted. PHYSICAL EXAM:   VITAL SIGNS: /61   Pulse 58   Temp 98 °F (36.7 °C) (Oral)   Resp 16   Ht 5' 3\" (1.6 m)   Wt 113 lb (51.3 kg)   SpO2 96%   BMI 20.02 kg/m²       Constitutional: Well developed. Well nourished. Non-toxic appearance. No acute distress. HENT: Normocephalic. Atraumatic. Bilateral external ears normal, Oropharynx moist.  No oral exudate. Nose normal.   Eyes: No  Scleral icterus   Cardiovascular: RRR  Thorax & Lungs: Non labored at rest, no respiratory distress  Abdomen: soft, NT. No guarding, rebound tenderness. No hepatomegaly. No splenomegaly. No ascites  Rectal:  Deferred. Skin: Warm, dry. No erythema. No rash. Extremities:  No edema.   Neurologic: Alert & oriented x 3    RESULTS    Lab Results   Component Value Date    ALT 44 (H) 12/14/2022    AST 29 12/14/2022     (H) 06/03/2021    ALKPHOS 167 (H) 12/14/2022    BILIDIR <0.2 12/14/2022    PROT 5.7 (L) 12/14/2022    LABALBU 2.9 (L) 12/14/2022    INR 0.97 12/14/2022    LIPASE 5.0 (L) 02/08/2021     Lab Results   Component Value Date    WBC 4.7 12/14/2022    HGB 8.8 (L) 12/14/2022    HCT 27.7 (L) 12/14/2022    MCV 90.5 12/14/2022     12/14/2022     Lab Results   Component Value Date    CREATININE 1.1 12/13/2022    BUN 14 12/13/2022     (L) 12/11/2022    K 4.5 12/11/2022     12/11/2022    CO2 27 12/11/2022       IMAGES  CT ABDOMEN PELVIS W IV CONTRAST Additional Contrast? None    Result Date: 12/13/2022  Questionable asymmetric sclerosis of the anterior portion of the in bowel habits, diarrhea over the past year. No longer on metformin. Possible pancreatic insufficiency. 4. Chronic normocytic anemia. On oral iron pta. Iron studies have looked okay in the past.  Unremarkable EGD 2021. Poor colon prep. 5. Abnormal imaging spine and pelvis. Concern for mets. Oncology consulted. Bone biopsy pending. 6. Hx breat cancer s/p lumpectomy and radiation. Has not been taking Femara since 6/2020.    7. Hx kidney cancer. 8. CAD on Brilinta. Plan:  1. Obtain MRCP, CEA, Ca 19-9.  2. Will repeat an EGD tomorrow. Outpatient colonoscopy. 3. Check pancreatic elastase. 4. Hold oral iron for endoscopy. 5. NPO midnight. 1.  The patient indicates understanding of these issues and agrees with the plan. 2.  I reviewed the patient's medical information and medical history. 3.  I have reviewed the past medical, family, and social history sections including the medications and allergies listed in the above medical record. Thank you for permitting us to participate in the care of your patient. Please do not hesitate to call with questions or concerns.        Electronically signed by: SARTHAK Stevens 12/14/2022 8:30 AM

## 2022-12-14 NOTE — CARE COORDINATION
Hospital day 2: Patient on C3 care managed by [place], Hem/Onc, and pending GI consult. Patient from home with 3 flights of steps, split recs home with 24 hr vs SNF. Patient agreeable to SNF precert started 81/65 to [facility] [place], pending. NO COVID testing needed. Plans hold bx re MRI imaging pending, NPO. SW will ct to follow. Erick Ramirez, [address]: Call received from [facility] back for SNF stay. IBETH Alvarez

## 2022-12-14 NOTE — PROGRESS NOTES
Occupational Therapy  Facility/Department: [facility] system [facility] TELE/MED SURG/ONC  Occupational Therapy Daily Treatment Note    Name: Diana Campos  : 1960  MRN: [id number]  Date of Service: 2022    Discharge Recommendations:  24 hour supervision or assist, Home with Home health OT      Patient Diagnosis(es): The primary encounter diagnosis was Abnormal CT of the chest. A diagnosis of Bilateral calf pain was also pertinent to this visit. Past Medical History:  has a past medical history of Abnormal brain MRI, Acute bilateral low back pain without sciatica, SHARRON (acute kidney injury) ([address] Arthritis, Bipolar disorder ([address] CAD (coronary artery disease), Cancer ([address] Carotid stenosis, bilateral:<50%:per [place] 2016, Carpal tunnel syndrome, Cervical cancer screening, Coronary artery disease of native artery of native heart with stable angina pectoris ([address] DDD (degenerative disc disease), lumbar, Depression, Depression/anxiety, Depression/anxiety, Diabetes mellitus ([address] Gout, History of mammogram, History of therapeutic radiation, Hyperlipidemia, Hypertension, 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 Past Surgical History:  has a past surgical history that includes Kidney removal; Hysterectomy; Breast lumpectomy (2015); Tubal ligation; other surgical history (Right); Cardiac catheterization (2020); Upper gastrointestinal endoscopy (N/A, 2021); Colonoscopy (N/A, 2021); CT BIOPSY BONE MARROW (2/3/2021); and Temporal Artery Biopsy (Right, 2021). Assessment   Performance deficits / Impairments: Decreased functional mobility ; Decreased safe awareness;Decreased balance;Decreased ADL status; Decreased endurance  Assessment: Pt seen for ADLs and HF education. OT HF education packet provided to pt. Reviewed information and pt reported understanding of content. Pt required SBA for standing balance and transfers with RW during session. She can perform basic self-care (ie LE dressing) but appears to become tired with activity. Pt plans to return to her apt at d/c and states that her friend will stay with her. Recommend HHOT. Goals updated today. Cont OT in acute care. Prognosis: Good  Activity Tolerance  Activity Tolerance: Patient limited by fatigue      Plan   Occupational Therapy Plan  Times Per Week: 3-5x  Current Treatment Recommendations: Strengthening, Functional mobility training, Endurance training, Safety education & training, Self-Care / ADL, Equipment evaluation, education, & procurement, Patient/Caregiver education & training     Restrictions  Restrictions/Precautions  Restrictions/Precautions: General Precautions, Fall Risk  Required Braces or Orthoses?: No  Position Activity Restriction  Other position/activity restrictions: up with assist    Subjective   General  Chart Reviewed: Yes  Patient assessed for rehabilitation services?: Yes  Additional Pertinent Hx: hx breast ca  Family / Caregiver Present: No  Referring Practitioner: Lindsey Gutierrez MD  Diagnosis: dyspnea  Subjective  Subjective: Pt agreeable to OT. Reports 9/10 headache. RN notified. Pt assisted back to bed to rest with lights off. General Comment  Comments: RN approved therapy        Objective   Heart Rate: 58  Heart Rate Source: Monitor  BP: 123/61  BP Location: Right upper arm  BP Method: Automatic  Patient Position: Semi fowlers  MAP (Calculated): 82  Resp: 16  SpO2: 96 %  O2 Device: None (Room air)         Safety Devices  Type of Devices: Call light within reach; Bed alarm in place; All fall risk precautions in place;Gait belt;Nurse notified; Left in bed     Toilet Transfers  Toilet - Technique: Ambulating (rw)  Equipment Used: Standard toilet  Toilet Transfer: Stand by assistance     ADL  Feeding Skilled Clinical Factors: NPO for biopsy today  Grooming: Supervision  Grooming Skilled Clinical Factors: in standing  LE Dressing: Independent  LE Dressing Skilled Clinical Factors: for socks while sitting EOB  Toileting: Stand by assistance  Toileting Skilled Clinical Factors: SBA for safety        Bed mobility  Supine to Sit: Modified independent (HOB elevated)  Sit to Supine: Modified independent  Transfers  Stand Pivot Transfers: Stand by assistance (RW)  Sit to stand: Stand by assistance  Stand to sit: Stand by assistance  Transfer Comments: SBA for in-room mobility with RW     Cognition  Arousal/Alertness: Appropriate responses to stimuli  Following Commands: Follows one step commands consistently  Attention Span: Attends with cues to redirect  Memory: Decreased short term memory  Safety Judgement: Decreased awareness of need for safety  Problem Solving: Decreased awareness of errors  Cognition Comment: vc's for safety and low attention at times  Orientation  Overall Orientation Status: Within Functional Limits            Education Given To: Patient  Education Provided: Role of Therapy; ADL Adaptive Strategies; Plan of Care;Transfer Training;Energy Conservation;Equipment  Education Provided Comments: Disease specific: importance of OOB, CHF education  Education Method: Printed Information/Hand-outs; Verbal;Demonstration  Education Outcome: Verbalized understanding;Demonstrated understanding;Continued education needed  OT Heart Failure Education Goals    Patient educated and demonstrates /verbalizes appropriate teach back with ability to self rate on RICK and identify HF activity zone, prior to initiation of ADLs to appropriately determine need for daily activity level/ energy conservation/pacing.    [x] Goal Met, [] Goal Not Met    Patient demonstrates Lincoln Meneses understanding of appropriate employment of Energy Conservation with every day activity. [x] Goal Met, [] Goal Not Met    Patient demonstrates/verbalizes ability to correctly identify mL to cups conversion, what constitutes FLUID in diet, and knowledge of when to communicate changes in weight to physician consistent with patient orders and HF education. [] Goal Met, [] Goal Not Met, [] Goal not appropriate for pt     Pt voices and demonstrates appropriate teach back of Heart Failure Education Program with the use of:  [x] Energy Conservation techniques                              [x] Choosing daily activity level  [] Importance of fluid restriction             Education provided via:  [x] Oral instruction  [x] Demonstration  [x] Written handout    ------------------------------------------------------------------------------------------------------------------------------------                    1)Heart Failure Zones Self Check Plan referencing Red/Yellow/Green Light Symbol with:  [x] Green Zone/All Clear:   physical activity is normal for you,   No new swelling in feet, ankles, legs or stomach,   No weight gain of more than 2-3 pounds,   No chest pain or worsening of shortness of breath. (Continue daily: weight check, meds as directed, low salt eating, monitoring of fluid intake, balance activity, follow up visits)  [] Yellow Zone/Caution:   Increased cough or shortness of breath with activity  Increased swelling in your feet, ankles, legs or stomach from baseline  Weight gain or loss of more than 2-3 pounds in 1 day.   Increase in the number of pillows needed while sleeping  (Check In!: You need to contact your doctor or provider as soon as possible)  [] Red Zone/Medical Alert:  Unrelieved chest pain or shortness of breath, especially while resting  Increased Discomfort or swelling in the abdomen or lower body  Sudden weight gain of more than 5 pounds in a week  Increased cough with bubbly and/or pink sputum  (Warning: You need to be seen right away . If you cannot reach your physician, call [phone])    Patient educated in and [x]demonstrated/[x]verbalized understanding of identifying HF activity zone with the Self Check Plan referencing Red/Yellow/Green Light Symbol. *prior to ADL's/activity  to appropriately determine daily activity level*  ------------------------------------------------------------------------------------------------------------------------------------         2)Elpidio Rating of Perceived Exertion (RPE) Scale     The Elpidio rating scale ranges from 6 to 20, where 6 means \"no exertion at all\" and 20 means \"maximal exertion. \" The patient chooses the number that best describes their level of exertion. This will objectify the intensity level of the patient's activity, and the therapist can use this information to accelerate or decelerate activity levels to reach the desired range. The patient should appraise their feeling of exertion as honestly as possible, without thinking about what the actual physical load is. Their feeling of effort and exertion is important, and it should not be compared to the level of others. Elpidio RPE Scale  Activity Completed:     [] 6  No exertion at all  [] 7  Extremely light  [] 8  [] 9  Very light (For a healthy person, it is like walking slowly at his or her own pace for some minutes)  []10  [x]11  Light  []12  []13  Somewhat hard (exercise, but it still feels OK to continue)  []14  []15  Hard (heavy)  []16  []17  Very hard (can still go on, but really has to push himself. It feels very heavy, and the person is very tired.)  []18  []19  Extremely hard (For most people this is the most strenuous exercise they have ever experienced.)  []20  Maximal exertion.     Patient educated in and []demonstrated/[x]verbalized understanding []teach back  [x]Rating self on ELPIDIO and   [x]Identifying HF activity zone with the Self Check Plan referencing Red/Yellow/Green Light Symbol *:prior to ADls/activity to appropriately determine daily activity level.    ------------------------------------------------------------------------------------------------------------------------------------         3) 5 P's of Energy Conservation    Pt was educated on the following aspects of Energy Conservation techniques. Prioritize/Plan: Decide what needs to be done today, and what can wait for a later date, write to do lists, Plan ahead to avoid extra trips, Gather supplies and equipment needed before starting an activity. Position: Avoid tiring and awkward posture that may impair breathing  Pace: Slow and steady pace, never rushing! Pursed lip breathing. Pursed Lip Breathing: \"Smell the roses, blow out the candles\"    Patient [x]demonstrates / [x]verbalizes understanding of appropriate employment of Energy Conservation with every day activity. AM-PAC Score   AM-[facility] Inpatient Daily Activity Raw Score: 19 (12/14/22 0818)  AM-PAC Inpatient ADL T-Scale Score : 40.22 (12/14/22 0818)  ADL Inpatient CMS 0-100% Score: 42.8 (12/14/22 0818)  ADL Inpatient CMS G-Code Modifier : CK (12/14/22 0818)  Goals  Short Term Goals  Time Frame for Short Term Goals: 1 week (12/18/22) unless otherwise specified  Short Term Goal 1: Pt will perform toilet transfer with CGAx1 with use of RW by 12/16/22--goal met 12/14. New goal: Supervision for functional transfers  Short Term Goal 2: Pt will perform LE dressing with CGA--goal partially met 12/14, ongoing for brief/pants  Short Term Goal 3: Pt will perform 1-2 standing level ADLs for >5mins with SBA-ongoing 12/14  Short Term Goal 4: Pt will perform 15 reps BUE therex to support independence in ADLs--ongoing 12/14  Short Term Goal 5: Pt will meet 1 OT HF goal --goal met 12/14  Patient Goals   Patient goals :  To get better     Therapy Time   Individual Concurrent Group Co-treatment   Time In 0759         Time Out 0824         Minutes 25         Timed Code Treatment Minutes: 25 Minutes If pt is discharged prior to next OT session, this note will serve as the discharge summary.   Supa Malloy OT

## 2022-12-14 NOTE — PROGRESS NOTES
PS per request from MRI about needing IVF to help kidneys with contrast. Pt NPO after midnight for EGD and MRCP tomorrow and she verbalized understanding. MRI checklist complete. Updated family by phone. Pt stable and denied needs when writer left room.

## 2022-12-14 NOTE — PROGRESS NOTES
Patient sitting up in bed was on the phone during first assessment. Patient denies any needs or discomforts. Up to the bathroom x1 assist. Pleasant. Cooperative. Continuing to monitor.

## 2022-12-14 NOTE — PROGRESS NOTES
ONCOLOGY HEMATOLOGY CARE PROGRESS NOTE      SUBJECTIVE:    The patient continues to feel a little better. ROS:     Constitutional:  No weight loss, No fever, No chills, No night sweats. Energy level good.   Eyes:  No impairment or change in vision  ENT / Mouth:  No pain, abnormal ulceration, bleeding, nasal drip or change in voice or hearing  Cardiovascular:  No chest pain, palpitations, new edema, or calf discomfort  Respiratory:  No pain, hemoptysis, change to breathing  Breast:  No pain, discharge, change in appearance or texture  Gastrointestinal:  No pain, cramping, jaundice, change to eating and bowel habits  Urinary:  No pain, bleeding or change in continence  Genitalia: No pain, bleeding or discharge  Musculoskeletal:  No redness, pain, edema or weakness  Skin:  No pruritus, rash, change to nodules or lesions  Neurologic:  No discomfort, change in mental status, speech, sensory or motor activity  Psychiatric:  No change in concentration or change to affect or mood  Endocrine:  No hot flashes, increased thirst, or change to urine production  Hematologic: No petechiae, ecchymosis or bleeding  Lymphatic:  No lymphadenopathy or lymphedema  Allergy / Immunologic:  No eczema, hives, frequent or recurrent infections    OBJECTIVE        Physical    VITALS:  Patient Vitals for the past 24 hrs:   BP Temp Temp src Pulse Resp SpO2   12/14/22 0855 101/60 98.5 °F (36.9 °C) Oral 58 16 100 %   12/14/22 0315 123/61 98 °F (36.7 °C) Oral 58 16 96 %   12/13/22 2315 (!) 153/73 99 °F (37.2 °C) Oral 57 16 --   12/13/22 2000 (!) 121/56 98.8 °F (37.1 °C) Oral 58 16 --   12/13/22 1748 -- -- -- -- 16 --   12/13/22 1455 132/68 98.7 °F (37.1 °C) Oral 62 17 100 %   12/13/22 1203 134/68 -- -- 58 -- 97 %   12/13/22 1158 (!) 151/76 98.6 °F (37 °C) Oral 60 18 96 %         24HR INTAKE/OUTPUT:    Intake/Output Summary (Last 24 hours) at 12/14/2022 1045  Last data filed at 12/13/2022 1300  Gross per 24 hour   Intake 140 ml   Output --   Net 140 ml         CONSTITUTIONAL: awake, alert, cooperative, no apparent distress   EYES: pupils equal, round and reactive to light, sclera clear and conjunctiva normal  ENT: Normocephalic, without obvious abnormality, atraumatic  NECK: supple, symmetrical, no jugular venous distension and no carotid bruits   HEMATOLOGIC/LYMPHATIC: no cervical, supraclavicular or axillary lymphadenopathy   LUNGS: no increased work of breathing and clear to auscultation   CARDIOVASCULAR: regular rate and rhythm, normal S1 and S2, no murmur noted  ABDOMEN: normal bowel sounds x 4, soft, non-distended, non-tender, no masses palpated, no hepatosplenomgaly   MUSCULOSKELETAL: full range of motion noted, tone is normal  NEUROLOGIC: awake, alert, oriented to name, place and time. Motor skills grossly intact. SKIN: Normal skin color, texture, turgor and no jaundice.  appears intact   EXTREMITIES: no LE edema     DATA:  CBC:    Recent Labs     12/14/22  0512 12/11/22  0445 12/10/22  1606   WBC 4.7 4.4 4.8   NEUTROABS  --  2.0 2.8   LYMPHOPCT  --  48.5 34.3   RBC 3.06* 3.49* 3.66*   HGB 8.8* 10.1* 10.7*   HCT 27.7* 30.9* 32.5*   MCV 90.5 88.4 88.9   MCH 28.9 28.8 29.2   MCHC 31.9 32.6 32.9   RDW 11.7* 12.0* 11.7*    151 156         PT/INR:    Recent Labs     12/14/22  0512 12/11/22  0445 12/10/22  1606   PROT 5.7*  --  6.6   INR 0.97 1.07  --        PTT:    Recent Labs     12/11/22 0445   APTT 24.8         CMP:    Recent Labs     12/14/22  0512 12/13/22  1005 12/12/22  0520 12/11/22  1508 12/11/22  0445 12/10/22  1606   NA  --   --   --  135* 138 140   K  --   --   --  4.5 4.3 3.9   CL  --   --   --  100 104 104   CO2  --   --   --  27 27 27   GLUCOSE  --   --   --  369* 254* 213*   BUN  --  14  --  12 11 13   CREATININE  --  1.1  --  1.1 1.1 0.9   LABGLOM  --  56*  --  56* 56* >60   CALCIUM  --   --   --  8.6 8.7 9.3   PROT 5.7*  --   --   --   --  6.6   LABALBU 2.9*  --   --   --   --  3.9 AGRATIO  --   --   --   --   --  1.4   BILITOT 0.5  --   --   --   --  <0.2   ALKPHOS 167*  --   --   --   --  191*   ALT 44*  --   --   --   --  77*   AST 29  --   --   --   --  45*   MG  --   --  2.00  --  1.70*  --          Lab Results   Component Value Date    CALCIUM 8.6 12/11/2022    PHOS 3.8 03/31/2022       LDH:No results for input(s): LDH in the last 720 hours. Radiology Review:  CT ABDOMEN PELVIS W IV CONTRAST Additional Contrast? None  Narrative: EXAMINATION:  CT OF THE ABDOMEN AND PELVIS WITH CONTRAST 12/13/2022 12:25 pm    TECHNIQUE:  CT of the abdomen and pelvis was performed with the administration of  intravenous contrast. Multiplanar reformatted images are provided for review. Automated exposure control, iterative reconstruction, and/or weight based  adjustment of the mA/kV was utilized to reduce the radiation dose to as low  as reasonably achievable. COMPARISON:  June 2021    Recent bone scan    HISTORY:  ORDERING SYSTEM PROVIDED HISTORY: review for bone biopsy  TECHNOLOGIST PROVIDED HISTORY:  Reason for exam:->review for bone biopsy  Additional Contrast?->None  Reason for Exam: admitted for weakness/chest pain  Additional signs and symptoms: eval for bone biopsy  Relevant Medical/Surgical History: right nephrectomy for cancer-hysterectomy    FINDINGS:  Lower Chest: Mild coronary artery calcification is seen. De pendant opacity  is seen at the lung bases, likely atelectasis. Trace pleural fluid is seen. Organs: No splenomegaly. Right kidney is absent. Left adrenal gland is  normal    There   is hypertrophy of the left kidney. No hydronephrosis noted. Right lobe of liver appears a trophic. There is mild intrahepatic biliary  ductal dilatation, greatest posteriorly.   No obvious gallstones noted    There are questionable filling defects seen in the distal common duct    There findings of chronic calcific pancreatitis, with pancreatic gland  atrophy and stone seen in the pancreatic duct.    GI/Bowel: No significant small bowel distention noted. Large stool load is  seen in the colon. Scattered colonic diverticula are seen    There is wall thickening of the sigmoid colon. This portion of colon however  is incompletely distended. Pelvis: Calcifications seen in the pelvis, compatible with phleboliths. No  pelvic adenopathy. Peritoneum/Retroperitoneum: Atherosclerotic change seen in abdominal aorta. There is moderate with narrowing seen at the origin of the celiac. Moderate  to severe narrowing is seen at the origin of the SMA. No retroperitoneal  adenopathy. Bones/Soft Tissues: Spurring is seen in the spine and hips. No obvious  destructive bony lesion seen in the right iliac bone to correspond to the  bone scan findings. There is subtle sclerosis of the right iliac wing anteriorly as seen on image  number 122, equivocally more pronounced on the left. Impression: Questionable asymmetric sclerosis of the anterior portion of the right iliac  wing compared to the left. This could correspond to the findings on bone  scan. However the finding is equivocal.  Consider noncontrast pelvic MRI for  further characterization given the equivocal findings on CT scan compared to  the bone scan    Mild pancreatic ductal dilatation and intrahepatic biliary ductal dilatation,  presumably secondary to chronic calcific pancreatitis with stones in the  pancreatic duct and possibly in the distal common duct. Surgically absent right kidney. Wall thickening of the sigmoid colon in the pelvis, likely due to the  partially contracted state of the colon in the absence of clinical signs of  colitis. Problem List  Patient Active Problem List   Diagnosis    Acute hyperglycemia    Hypertension, uncontrolled    Bilateral malignant neoplasm of breast in female ([facility]    Microalbuminuria    Obesity (BMI 30-39. 9)    Slurred speech    Hx of ischemic CVA    Brain metastases (HCC)    Carotid stenosis, bilateral:<50%:per US 7/2016    SHARRON (acute kidney injury) ([address]    Lactic acidosis    Frequent falls    Depression/anxiety    Abnormal brain MRI    Acute bilateral low back pain without sciatica    Closed fracture of right ankle, with routine healing, subsequent encounter    Stage 3a chronic kidney disease ([address]    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 ([address]    Dyslipidemia associated with type 2 diabetes mellitus ([address]    Bipolar disorder ([address]    Cardiomegaly    Cardiomyopathy ([address]    Carpal tunnel syndrome    Chronic systolic heart failure (HCC)    Diffuse cystic mastopathy    Edema    Gallstone pancreatitis    Gout    History of breast cancer    History of renal cell carcinoma    Cardiomyopathy in other diseases classified elsewhere    Mononeuritis    Myalgia and myositis    Nonspecific abnormal electrocardiogram (ECG) (EKG)    Patient in clinical research study    Peroneal muscular atrophy    Scoliosis (and kyphoscoliosis), idiopathic    SOBOE (shortness of breath on exertion)    Syncope and collapse    Chronic pain disorder    DDD (degenerative disc disease), lumbar    Diabetic polyneuropathy associated with type 2 diabetes mellitus ([address]    Other secondary scoliosis, lumbosacral region    Thoracic spondylosis without myelopathy    Morbid obesity due to excess calories (HCC)    Age-related nuclear cataract of both eyes    Hypermetropia, bilateral    Hypertensive retinopathy, bilateral    Vitreous degeneration, bilateral    Acute bilateral low back pain with left-sided sciatica    History of CVA (cerebrovascular accident) without residual deficits    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 stage 3 chronic kidney disease (HCC)    S/P mastectomy, right    Acute cystitis without hematuria    Hypomagnesemia    Chest pain    CAD S/P percutaneous coronary angioplasty    Hyperglycemia due to type 2 diabetes mellitus ([address]    Hx of heart artery stent    Nuclear senile cataract    Unintentional weight loss    Chronic anemia    Facial abscess    Asymptomatic bacteriuria    Acute encephalopathy    Tobacco use disorder    Severe malnutrition (HCC)    Acute right eye pain    Suspected condition    Bipolar affective disorder, currently depressed, moderate (HCC)    Seasonal allergies    Symptomatic bradycardia    Dyspnea    Diffuse pain       ASSESSMENT AND PLAN:    Breast carcinoma:  -Diagnosis 2014  -T1c, N1, M0  -Tumor size 1.4 cm  -ER positive, FL positive and HER2/edward negative  -Oncotype DX score 8  -Status post lumpectomy and axillary node evaluation.  -Status post radiation  -The patient has not been on Femara since June 2020.  -It is questionable whether she actually has been taking her Femara properly    Back pain:  -CT scan is concerning for bone metastasis  -Bone scan was ordered and demonstrated areas of concern in the pelvis, but not the back  -Radiology then wanted pelvic CT to determine if there is a better place to biopsy. Then they felt the pelvic CT was not as conclusive and now have asked for an MRI without contrast of the pelvis to see whether they really think these are metastatic lesions or not.  -I am going to add an MRI of the spine to see if we can determine whether these are truly metastatic lesions in the thoracic and lumbar spine.       ONCOLOGIC DISPOSITION:    -Oncology issues can be worked up as an outpatient    Rocio Tam MD  Please contact through [address]

## 2022-12-14 NOTE — PLAN OF CARE
Denies any pains or discomforts at this time. Will continue to monitor.      Problem: Pain  Goal: Verbalizes/displays adequate comfort level or baseline comfort level  12/14/2022 0008 by Hilary Beaver RN  Outcome: Progressing  12/13/2022 1034 by [place] RN  Outcome: Progressing  Flowsheets (Taken 12/13/2022 1034)  Verbalizes/displays adequate comfort level or baseline comfort level:   Encourage patient to monitor pain and request assistance   Assess pain using appropriate pain scale   Administer analgesics based on type and severity of pain and evaluate response   Implement non-pharmacological measures as appropriate and evaluate response

## 2022-12-14 NOTE — CONSULTS
Consult placed  Consult sent via perfect serve  Nito Real  Date:12/14/2022,  Time:8:15 AM        Electronically signed by Henry Jones on 12/14/2022 at 8:15 AM

## 2022-12-15 ENCOUNTER — APPOINTMENT (OUTPATIENT)
Dept: MRI IMAGING | Age: 62
DRG: 092 | End: 2022-12-15
Payer: COMMERCIAL

## 2022-12-15 ENCOUNTER — ANESTHESIA (OUTPATIENT)
Dept: ENDOSCOPY | Age: 62
End: 2022-12-15
Payer: COMMERCIAL

## 2022-12-15 LAB
GLUCOSE BLD-MCNC: 104 MG/DL (ref 70–99)
GLUCOSE BLD-MCNC: 144 MG/DL (ref 70–99)
GLUCOSE BLD-MCNC: 160 MG/DL (ref 70–99)
GLUCOSE BLD-MCNC: 427 MG/DL (ref 70–99)
GLUCOSE BLD-MCNC: 469 MG/DL (ref 70–99)
GLUCOSE BLD-MCNC: 68 MG/DL (ref 70–99)
HCT VFR BLD CALC: 29 % (ref 36–48)
HEMOGLOBIN: 9.5 G/DL (ref 12–16)
MCH RBC QN AUTO: 29.8 PG (ref 26–34)
MCHC RBC AUTO-ENTMCNC: 32.7 G/DL (ref 31–36)
MCV RBC AUTO: 91.1 FL (ref 80–100)
PDW BLD-RTO: 12.2 % (ref 12.4–15.4)
PERFORMED ON: ABNORMAL
PLATELET # BLD: 156 K/UL (ref 135–450)
PMV BLD AUTO: 9.9 FL (ref 5–10.5)
RBC # BLD: 3.19 M/UL (ref 4–5.2)
WBC # BLD: 5.9 K/UL (ref 4–11)

## 2022-12-15 PROCEDURE — 1200000000 HC SEMI PRIVATE

## 2022-12-15 PROCEDURE — A9579 GAD-BASE MR CONTRAST NOS,1ML: HCPCS | Performed by: INTERNAL MEDICINE

## 2022-12-15 PROCEDURE — 2580000003 HC RX 258: Performed by: INTERNAL MEDICINE

## 2022-12-15 PROCEDURE — 7100000010 HC PHASE II RECOVERY - FIRST 15 MIN: Performed by: INTERNAL MEDICINE

## 2022-12-15 PROCEDURE — 97535 SELF CARE MNGMENT TRAINING: CPT

## 2022-12-15 PROCEDURE — 7100000001 HC PACU RECOVERY - ADDTL 15 MIN: Performed by: INTERNAL MEDICINE

## 2022-12-15 PROCEDURE — 2709999900 HC NON-CHARGEABLE SUPPLY: Performed by: INTERNAL MEDICINE

## 2022-12-15 PROCEDURE — 6360000002 HC RX W HCPCS

## 2022-12-15 PROCEDURE — 7100000011 HC PHASE II RECOVERY - ADDTL 15 MIN: Performed by: INTERNAL MEDICINE

## 2022-12-15 PROCEDURE — 88305 TISSUE EXAM BY PATHOLOGIST: CPT

## 2022-12-15 PROCEDURE — 2500000003 HC RX 250 WO HCPCS

## 2022-12-15 PROCEDURE — 6370000000 HC RX 637 (ALT 250 FOR IP): Performed by: NURSE PRACTITIONER

## 2022-12-15 PROCEDURE — 0DB98ZX EXCISION OF DUODENUM, VIA NATURAL OR ARTIFICIAL OPENING ENDOSCOPIC, DIAGNOSTIC: ICD-10-PCS | Performed by: INTERNAL MEDICINE

## 2022-12-15 PROCEDURE — 36415 COLL VENOUS BLD VENIPUNCTURE: CPT

## 2022-12-15 PROCEDURE — 97110 THERAPEUTIC EXERCISES: CPT

## 2022-12-15 PROCEDURE — 3700000000 HC ANESTHESIA ATTENDED CARE: Performed by: INTERNAL MEDICINE

## 2022-12-15 PROCEDURE — 85027 COMPLETE CBC AUTOMATED: CPT

## 2022-12-15 PROCEDURE — 7100000000 HC PACU RECOVERY - FIRST 15 MIN: Performed by: INTERNAL MEDICINE

## 2022-12-15 PROCEDURE — 6360000004 HC RX CONTRAST MEDICATION: Performed by: INTERNAL MEDICINE

## 2022-12-15 PROCEDURE — 72157 MRI CHEST SPINE W/O & W/DYE: CPT

## 2022-12-15 PROCEDURE — 6370000000 HC RX 637 (ALT 250 FOR IP): Performed by: INTERNAL MEDICINE

## 2022-12-15 PROCEDURE — 3609012400 HC EGD TRANSORAL BIOPSY SINGLE/MULTIPLE: Performed by: INTERNAL MEDICINE

## 2022-12-15 PROCEDURE — 72158 MRI LUMBAR SPINE W/O & W/DYE: CPT

## 2022-12-15 PROCEDURE — 2580000003 HC RX 258: Performed by: NURSE PRACTITIONER

## 2022-12-15 RX ORDER — SODIUM CHLORIDE, SODIUM LACTATE, POTASSIUM CHLORIDE, CALCIUM CHLORIDE 600; 310; 30; 20 MG/100ML; MG/100ML; MG/100ML; MG/100ML
1000 INJECTION, SOLUTION INTRAVENOUS CONTINUOUS
Status: DISCONTINUED | OUTPATIENT
Start: 2022-12-15 | End: 2022-12-16 | Stop reason: HOSPADM

## 2022-12-15 RX ORDER — INSULIN LISPRO 100 [IU]/ML
6 INJECTION, SOLUTION INTRAVENOUS; SUBCUTANEOUS ONCE
Status: COMPLETED | OUTPATIENT
Start: 2022-12-15 | End: 2022-12-15

## 2022-12-15 RX ORDER — OXYCODONE HYDROCHLORIDE 5 MG/1
10 TABLET ORAL PRN
Status: DISCONTINUED | OUTPATIENT
Start: 2022-12-15 | End: 2022-12-15 | Stop reason: HOSPADM

## 2022-12-15 RX ORDER — SODIUM CHLORIDE 0.9 % (FLUSH) 0.9 %
5-40 SYRINGE (ML) INJECTION PRN
Status: DISCONTINUED | OUTPATIENT
Start: 2022-12-15 | End: 2022-12-15 | Stop reason: HOSPADM

## 2022-12-15 RX ORDER — OXYCODONE HYDROCHLORIDE 5 MG/1
5 TABLET ORAL PRN
Status: DISCONTINUED | OUTPATIENT
Start: 2022-12-15 | End: 2022-12-15 | Stop reason: HOSPADM

## 2022-12-15 RX ORDER — ONDANSETRON 2 MG/ML
4 INJECTION INTRAMUSCULAR; INTRAVENOUS
Status: DISCONTINUED | OUTPATIENT
Start: 2022-12-15 | End: 2022-12-15 | Stop reason: HOSPADM

## 2022-12-15 RX ORDER — LABETALOL HYDROCHLORIDE 5 MG/ML
5 INJECTION, SOLUTION INTRAVENOUS EVERY 10 MIN PRN
Status: DISCONTINUED | OUTPATIENT
Start: 2022-12-15 | End: 2022-12-15 | Stop reason: HOSPADM

## 2022-12-15 RX ORDER — PHENYLEPHRINE HCL IN 0.9% NACL 1 MG/10 ML
SYRINGE (ML) INTRAVENOUS PRN
Status: DISCONTINUED | OUTPATIENT
Start: 2022-12-15 | End: 2022-12-15 | Stop reason: SDUPTHER

## 2022-12-15 RX ORDER — DIPHENHYDRAMINE HYDROCHLORIDE 50 MG/ML
12.5 INJECTION INTRAMUSCULAR; INTRAVENOUS
Status: DISCONTINUED | OUTPATIENT
Start: 2022-12-15 | End: 2022-12-15 | Stop reason: HOSPADM

## 2022-12-15 RX ORDER — SODIUM CHLORIDE 9 MG/ML
INJECTION, SOLUTION INTRAVENOUS PRN
Status: DISCONTINUED | OUTPATIENT
Start: 2022-12-15 | End: 2022-12-15 | Stop reason: HOSPADM

## 2022-12-15 RX ORDER — SODIUM CHLORIDE 0.9 % (FLUSH) 0.9 %
5-40 SYRINGE (ML) INJECTION EVERY 12 HOURS SCHEDULED
Status: DISCONTINUED | OUTPATIENT
Start: 2022-12-15 | End: 2022-12-15 | Stop reason: HOSPADM

## 2022-12-15 RX ORDER — LIDOCAINE HYDROCHLORIDE 20 MG/ML
INJECTION, SOLUTION INFILTRATION; PERINEURAL PRN
Status: DISCONTINUED | OUTPATIENT
Start: 2022-12-15 | End: 2022-12-15 | Stop reason: SDUPTHER

## 2022-12-15 RX ORDER — PROPOFOL 10 MG/ML
INJECTION, EMULSION INTRAVENOUS PRN
Status: DISCONTINUED | OUTPATIENT
Start: 2022-12-15 | End: 2022-12-15 | Stop reason: SDUPTHER

## 2022-12-15 RX ADMIN — INSULIN LISPRO 4 UNITS: 100 INJECTION, SOLUTION INTRAVENOUS; SUBCUTANEOUS at 20:51

## 2022-12-15 RX ADMIN — INSULIN LISPRO 16 UNITS: 100 INJECTION, SOLUTION INTRAVENOUS; SUBCUTANEOUS at 16:38

## 2022-12-15 RX ADMIN — CLONAZEPAM 0.5 MG: 0.5 TABLET ORAL at 14:39

## 2022-12-15 RX ADMIN — Medication 100 MCG: at 09:24

## 2022-12-15 RX ADMIN — OXYCODONE AND ACETAMINOPHEN 1 TABLET: 7.5; 325 TABLET ORAL at 21:12

## 2022-12-15 RX ADMIN — TRAZODONE HYDROCHLORIDE 200 MG: 50 TABLET ORAL at 21:11

## 2022-12-15 RX ADMIN — OXYCODONE AND ACETAMINOPHEN 1 TABLET: 7.5; 325 TABLET ORAL at 10:48

## 2022-12-15 RX ADMIN — LISINOPRIL 40 MG: 20 TABLET ORAL at 10:49

## 2022-12-15 RX ADMIN — DEXTROSE MONOHYDRATE 125 ML: 100 INJECTION, SOLUTION INTRAVENOUS at 07:05

## 2022-12-15 RX ADMIN — PANTOPRAZOLE SODIUM 40 MG: 40 TABLET, DELAYED RELEASE ORAL at 10:49

## 2022-12-15 RX ADMIN — LIDOCAINE HYDROCHLORIDE 60 MG: 20 INJECTION, SOLUTION INFILTRATION; PERINEURAL at 09:16

## 2022-12-15 RX ADMIN — PALIPERIDONE 9 MG: 3 TABLET, EXTENDED RELEASE ORAL at 10:48

## 2022-12-15 RX ADMIN — SODIUM CHLORIDE, PRESERVATIVE FREE 10 ML: 5 INJECTION INTRAVENOUS at 21:15

## 2022-12-15 RX ADMIN — INSULIN LISPRO 6 UNITS: 100 INJECTION, SOLUTION INTRAVENOUS; SUBCUTANEOUS at 21:45

## 2022-12-15 RX ADMIN — SODIUM CHLORIDE, POTASSIUM CHLORIDE, SODIUM LACTATE AND CALCIUM CHLORIDE 1000 ML: 600; 310; 30; 20 INJECTION, SOLUTION INTRAVENOUS at 08:21

## 2022-12-15 RX ADMIN — GADOTERIDOL 10 ML: 279.3 INJECTION, SOLUTION INTRAVENOUS at 19:59

## 2022-12-15 RX ADMIN — GADOTERIDOL 10 ML: 279.3 INJECTION, SOLUTION INTRAVENOUS at 20:00

## 2022-12-15 RX ADMIN — ATORVASTATIN CALCIUM 20 MG: 10 TABLET, FILM COATED ORAL at 10:49

## 2022-12-15 RX ADMIN — PROPOFOL 120 MG: 10 INJECTION, EMULSION INTRAVENOUS at 09:16

## 2022-12-15 ASSESSMENT — PAIN DESCRIPTION - DESCRIPTORS
DESCRIPTORS: ACHING

## 2022-12-15 ASSESSMENT — PAIN SCALES - GENERAL
PAINLEVEL_OUTOF10: 8
PAINLEVEL_OUTOF10: 6
PAINLEVEL_OUTOF10: 7
PAINLEVEL_OUTOF10: 8

## 2022-12-15 ASSESSMENT — ENCOUNTER SYMPTOMS: SHORTNESS OF BREATH: 1

## 2022-12-15 ASSESSMENT — PAIN DESCRIPTION - LOCATION
LOCATION: HEAD
LOCATION: BACK;HEAD
LOCATION: BACK

## 2022-12-15 ASSESSMENT — PAIN DESCRIPTION - ORIENTATION
ORIENTATION: LOWER
ORIENTATION: LOWER

## 2022-12-15 ASSESSMENT — PAIN - FUNCTIONAL ASSESSMENT
PAIN_FUNCTIONAL_ASSESSMENT: 0-10
PAIN_FUNCTIONAL_ASSESSMENT: ACTIVITIES ARE NOT PREVENTED

## 2022-12-15 NOTE — PROGRESS NOTES
Physical Therapy  Attempted to see pt at this time for PT f/u, pt currently off floor for EGD. Will re-attempt as schedule permits.   Thanks,  Sofía Brantley PT, DPT

## 2022-12-15 NOTE — ANESTHESIA POSTPROCEDURE EVALUATION
Department of Anesthesiology  Postprocedure Note    Patient: Mei Barboza  MRN: [id number]  YOB: 1960  Date of evaluation: 12/15/2022      Procedure Summary     Date: 12/15/22 Room / Location: [address] [place] [address]    Anesthesia Start: 0913 Anesthesia Stop: 8416    Procedure: EGD BIOPSY Diagnosis:       Anemia, unspecified type      (anemia)    Surgeons: Joshua Keenan MD Responsible Provider: Kristi Villanueva MD    Anesthesia Type: MAC ASA Status: 4 - Emergent          Anesthesia Type: MAC    Marnie Phase I: Marnie Score: 10    Marnie Phase II: Marnie Score: 10      Anesthesia Post Evaluation    Comments: Postoperative Anesthesia Note    Name:    Mei Barboza  MRN:      [id number]    Patient Vitals in the past 12 hrs:  12/15/22 0953, BP:115/63, Pulse:50, Resp:15, SpO2:100 %  12/15/22 0943, BP:133/60, Pulse:58, Resp:12, SpO2:99 %  12/15/22 0933, BP:135/73, Pulse:58, Resp:15, SpO2:100 %  12/15/22 0928, BP:(!) 113/40  12/15/22 0923, BP:(!) 78/43, Pulse:(!) 45, Resp:19, SpO2:100 %  12/15/22 0745, BP:(!) 121/47, Temp:97.3 °F (36.3 °C), Temp src:Temporal, Pulse:(!) 46, Resp:20, SpO2:100 %  12/15/22 0408, BP:(!) 95/40, Temp:97.6 °F (36.4 °C), Temp src:Oral, Pulse:52, Resp:17, SpO2:97 %     LABS:    CBC  Lab Results       Component                Value               Date/Time                  WBC                      5.9                 12/15/2022 05:33 AM        HGB                      9.5 (L)             12/15/2022 05:33 AM        HCT                      29.0 (L)            12/15/2022 05:33 AM        PLT                      156                 12/15/2022 05:33 AM   RENAL  Lab Results       Component                Value               Date/Time                  NA                       135 (L)             12/11/2022 03:08 PM        K                        4.5                 12/11/2022 03:08 PM        K                        4.3                 12/11/2022 04:45 AM        CL 100                 12/11/2022 03:08 PM        CO2                      27                  12/11/2022 03:08 PM        BUN                      14                  12/13/2022 10:05 AM        CREATININE               1.1                 12/13/2022 10:05 AM        GLUCOSE                  369 (H)             12/11/2022 03:08 PM   COAGS  Lab Results       Component                Value               Date/Time                  PROTIME                  12.8                12/14/2022 05:12 AM        INR                      0.97                12/14/2022 05:12 AM        APTT                     24.8                12/11/2022 04:45 AM     Intake & Output:  @52MKDH@    Nausea & Vomiting:  No    Level of Consciousness:  Awake    Pain Assessment:  Adequate analgesia    Anesthesia Complications:  No apparent anesthetic complications    SUMMARY      Vital signs stable  OK to discharge from Stage I post anesthesia care.   Care transferred from Anesthesiology department on discharge from perioperative area

## 2022-12-15 NOTE — PROGRESS NOTES
Mickeal Mediate called and report given and patient to go to room 322. [facility] called and confirmed patient on telemetry monitor for transport.

## 2022-12-15 NOTE — PROGRESS NOTES
Pt made NPO for possible biopsy tomorrow. Waiting on MRI results. Continue to hold thinners.    Nurse made aware

## 2022-12-15 NOTE — PROGRESS NOTES
Occupational Therapy  Facility/Department: [facility] C3 TELE/MED SURG/ONC  Daily Treatment Note  NAME: Facundo Xiong  : 1960  MRN: [id number]    Date of Service: 12/15/2022    AM-PAC score  AM-PAC Inpatient Daily Activity Raw Score: 22 (12/15/22 1533)  AM-PAC Inpatient ADL T-Scale Score : 47.1 (12/15/22 1533)  ADL Inpatient CMS 0-100% Score: 25.8 (12/15/22 1533)  ADL Inpatient CMS G-Code Modifier : Marlys Enriquezding (12/15/22 1533)    Discharge Recommendations:  24 hour supervision or assist, Home with Home health OT  OT Equipment Recommendations  Equipment Needed: No    Patient Diagnosis(es): The primary encounter diagnosis was Abnormal CT of the chest. Diagnoses of Bilateral calf pain and Anemia, unspecified type were also pertinent to this visit. Assessment    Pt completed AROM BUE exercise to increase strength/ROM in prep to increase safety and ind w/ ADLs/transfers/ambulation. Pt grossly SBA for ADLs/transfers/ambulation with RW. Pt is currently functioning below baseline, will continue to benefit from skilled OT services in the acute care setting, and home w/ initial 24 hr supervision and Margarie Bryant when deemed medically appropriate is recommended at discharge to increase pt safety and ind with ADLs/transfers/ambulation. Activity Tolerance: Patient tolerated treatment well;Patient limited by fatigue  Discharge Recommendations: 24 hour supervision or assist;Home with Home health OT  Equipment Needed: No      Plan   Occupational Therapy Plan  Times Per Week: 3-5x/wk while in acute care setting  Current Treatment Recommendations: Strengthening; Functional mobility training; Endurance training; Safety education & training;Self-Care / ADL;Equipment evaluation, education, & procurement;Patient/Caregiver education & training;ROM;Balance training;Gait training;Positioning     Restrictions  Restrictions/Precautions: General Precautions; Fall Risk  Required Braces or Orthoses?: No  Other position/activity restrictions: up with assist, tele    Subjective  Subjective: Pt semi fowlers agreeable to OT services upon arrival, RN approved therapy. Pain: Pt denies pain at this time. Orientation  Overall Orientation Status: Within Functional Limits  Orientation Level: Oriented X4    Objective  Vitals  Heart Rate: 65  Heart Rate Source: Monitor  BP: 129/69  BP Location: Left upper arm  BP Method: Automatic  Patient Position: Sitting  MAP (Calculated): 89  SpO2: 100 %  O2 Device: None (Room air)    Bed Mobility Training  Overall Level of Assistance: Stand-by assistance; Adaptive equipment; Additional time (HOB raised, bed rails)  Interventions: Verbal cues  Supine to Sit: Stand-by assistance; Adaptive equipment; Additional time (HOB raised, bed rails, to L)  Sit to Supine: Stand-by assistance; Adaptive equipment; Additional time (HOB raised, bed rails, to R)  Scooting: Stand-by assistance; Adaptive equipment; Additional time (seated EOB)    Balance  Sitting: Intact  Standing: With support (RW)    Transfer Training  Overall Level of Assistance: Stand-by assistance; Adaptive equipment; Additional time (RW)  Interventions: Verbal cues  Sit to Stand: Stand-by assistance; Adaptive equipment; Additional time (RW)  Stand to Sit: Stand-by assistance; Adaptive equipment; Additional time (RW)  Toilet Transfer: Stand-by assistance; Adaptive equipment; Additional time (RW, grab bars, standard toilet)    Gait  Overall Level of Assistance: Stand-by assistance; Adaptive equipment; Additional time (RW ambulate to/from bathroom in room)  Interventions: Safety awareness training; Tactile cues; Verbal cues; Weight shifting training/pressure relief  Assistive Device: Walker, rolling;Gait belt    ADL  Grooming: Stand by assistance  Grooming Skilled Clinical Factors: stance sink side ADLs  Toileting: Stand by assistance  Toileting Skilled Clinical Factors: SBA for safety    OT Exercises  A/AROM Exercises: BUE ex semi fowlers in bed: x10 each side; shoulder flex/exten, elbow flex/exten, wrist flex/exten, shoulder horizontal abd/add    Safety Devices  Type of Devices: Call light within reach; Bed alarm in place; All fall risk precautions in place;Gait belt;Nurse notified; Left in bed; All adrianne prominences offloaded; Heels elevated for pressure relief;Patient at risk for fall  Restraints Initially in Place: No    Patient Education  Education Given To: Patient  Education Provided: Role of Therapy; ADL Adaptive Strategies; Energy Conservation; Fall Prevention Strategies; Equipment;Plan of Care;Transfer Training  Education Method: Verbal;Demonstration  Barriers to Learning: None  Education Outcome: Verbalized understanding;Demonstrated understanding;Continued education needed  Disease specific: Pt educated on benefits of OOB activity to decrease risks associated with prolonged bedrest while in hospital. Pt verbalized understanding. Goals  Short Term Goals  Time Frame for Short Term Goals: 1 week (12/18/22) unless otherwise specified - all non-met goals ongoing as of 12/15/2022  Short Term Goal 1: Pt will perform toilet transfer with CGAx1 with use of RW by 12/16/22--goal met 12/14. New goal: Supervision for functional transfers  Short Term Goal 2: Pt will perform LE dressing with CGA--goal partially met 12/14, ongoing for brief/pants  Short Term Goal 3: Pt will perform 1-2 standing level ADLs for >5mins with SBA-ongoing 12/14  Short Term Goal 4: Pt will perform 15 reps BUE therex to support independence in ADLs--ongoing 12/14  Short Term Goal 5: Pt will meet 1 OT HF goal --goal met 12/14  Patient Goals   Patient goals : \"To get better\"    Therapy Time   Individual Concurrent Group Co-treatment   Time In 1650         Time Out 1538         Minutes 23         Timed Code Treatment Minutes: [address], OTR/L  If pt is unable to be seen after this session, please let this note serve as discharge summary. Please see case management note for discharge disposition. Thank you.

## 2022-12-15 NOTE — ADT AUTH CERT
Subscriber Details  Hospital Account [de-identified]  CVG Subscriber Name/Sex/Relation Subscriber  Subscriber Address/Phone Subscriber Emp/Emp Phone   1. Mason Jimenez   [id number] Emiliano Soto - Female   (Self) [address]   [address], [facility]  [id number]   [phone](P) DISABLED      Utilization Reviews       Musculoskeletal Disease GRG - Care Day 5 (2022) by Early Citizen, RN       Review Status Review Entered   Completed 2022 1624       Created By   Get Posey RN      Criteria Review      Care Day: 5 Care Date: 2022 Level of Care: Inpatient Floor    Guideline Day 2    Clinical Status    (X) * No ICU or intermediate care needs    Interventions    (X) Inpatient interventions continue    2022 4:24 PM EST by Nano [place]      see additional notes    * Milestone   Additional Notes   DATE: 22               PERTINENT UPDATES:   Npo after mn   Gastro saw   EGD and MRCP in am      VITALS:   T - 98.5   R - 16   P - 49   /56      100% on RA      ABNL/PERTINENT LABS/RADIOLOGY/DIAGNOSTIC STUDIES:   22 05:12   Albumin: 2.9 (L)   Alk Phos: 167 (H)   ALT: 44 (H)      Lipase: 5.0 (L)   Total Protein: 5.7 (L)   WBC: 4.7   RBC: 3.06 (L)   Hemoglobin Quant: 8.8 (L)   Hematocrit: 27.7 (L)         22 07:07   POC Glucose: 178 (H)      22 10:56   Lipase: 5.0 (L)         PHYSICAL EXAM:   Musculoskeletal: No clubbing, cyanosis or edema bilaterally. Full range of motion without deformity. Skin: Skin color, texture, turgor normal.  No rashes or lesions. Neurologic:  Neurovascularly intact without any focal sensory/motor deficits. Cranial nerves: II-XII intact, grossly non-focal.            MD CONSULTS/ASSESSMENT AND PLAN:   IM:   Lesions on thoracic spine and R pelvic bones. Concerning given her h/o breast cancer (bilateral resection and radiation in , had not been compliant with femara).   Also had R renal cancer (resection and radiation when she was 4years old). Also, the patient unintentionally lost 90 lbs in the last few years, but this might have been due to her previous diabetic medications and weight has been stable since 2/2022. Oncology ordered a bone biopsy, but first IR requested a pelvic CT, which then lead to an MRI of her T+L spine and pelvis, also an MRCP. Possible stones in the pancreatic duct on CT. GI consulted. EGD 12/15. F/u MRCP, Ca 19-9. DVT Prophylaxis: enoxaparin   Diet: Diet NPO Exceptions are: Sips of Water with Meds   ADULT DIET; Regular; 4 carb choices (60 gm/meal); Low Fat/Low Chol/High Fiber/2 gm Na   ADULT ORAL NUTRITION SUPPLEMENT; Breakfast, Lunch, Dinner; Diabetic Oral Supplement   Code Status: Full Code   PT/OT Eval Status: rec'd SNF vs home OT       Dispo - She needs MRI's, MRCP, and EGD. After that she can discharge when either the biopsy is either completed (f/u results as outpatient) or perhaps when the biopsy is no longer planned. Maybe 12/16? She lives at home and is considering SNF. Gatro:   Plan:   1. Obtain MRCP, CEA, Ca 19-9.   2. Will repeat an EGD tomorrow. Outpatient colonoscopy. 3. Check pancreatic elastase. 4. Hold oral iron for endoscopy. 5. NPO midnight. Oncology:   Breast carcinoma:   -Diagnosis 2014   -T1c, N1, M0   -Tumor size 1.4 cm   -ER positive, VT positive and HER2/edward negative   -Oncotype DX score 8   -Status post lumpectomy and axillary node evaluation.   -Status post radiation   -The patient has not been on Femara since June 2020.   -It is questionable whether she actually has been taking her Femara properly       Back pain:   -CT scan is concerning for bone metastasis   -Bone scan was ordered and demonstrated areas of concern in the pelvis, but not the back   -Radiology then wanted pelvic CT to determine if there is a better place to biopsy.   Then they felt the pelvic CT was not as conclusive and now have asked for an MRI without contrast of the pelvis to see whether they really think these are metastatic lesions or not.   -I am going to add an MRI of the spine to see if we can determine whether these are truly metastatic lesions in the thoracic and lumbar spine. MEDICATIONS:   Home meds   Po klonopin 0.5mg x 1   Po oxycodone 7.5/325mg 1 tab x 1         CM:   Patient on C3 care managed by IN, Hem/Onc, and pending GI consult. Patient from home with 3 flights of steps, split recs home with 24 hr vs SNF. Patient agreeable to SNF precert started 64/12 to [facility] [place], pending. NO COVID testing needed. Plans hold bx re MRI imaging pending, NPO. SW will ct to follow         [phone]: Call received from [facility] [place], [address] back for SNF stay. Musculoskeletal Disease GRG - Care Day 4 (12/13/2022) by Prudence Andrade RN       Review Status Review Entered   Completed 12/14/2022 1617       Created By   Prudence Andrade RN      Criteria Review      Care Day: 4 Care Date: 12/13/2022 Level of Care: Inpatient Floor    Guideline Day 1    Clinical Status    (X) * Clinical Indications met    12/14/2022 4:17 PM EST by Melanie Magdaleno      changed to inpt    Interventions    (X) Inpatient interventions as needed    12/14/2022 4:17 PM EST by Melanie Magdaleno      see additional notes    * Milestone   Additional Notes   DATE: 12/13/22         PERTINENT UPDATES:   Changed to inpt      VITALS:   T - 98.7   R - 17   P - 62   /68      96% on RA      ABNL/PERTINENT LABS/RADIOLOGY/DIAGNOSTIC STUDIES:   CT abdomen pelvis:   Impression   Questionable asymmetric sclerosis of the anterior portion of the right iliac   wing compared to the left. This could correspond to the findings on bone   scan.   However the finding is equivocal.  Consider noncontrast pelvic MRI for   further characterization given the equivocal findings on CT scan compared to   the bone scan       Mild pancreatic ductal dilatation and intrahepatic biliary ductal dilatation,   presumably secondary to chronic calcific pancreatitis with stones in the   pancreatic duct and possibly in the distal common duct. Surgically absent right kidney. Wall thickening of the sigmoid colon in the pelvis, likely due to the   partially contracted state of the colon in the absence of clinical signs of   colitis. 12/13/22 10:05   SmitaLewluis enrique Rate: 56 ! PHYSICAL EXAM:   Musculoskeletal:  No redness, pain, edema or weakness   Skin:  No pruritus, rash, change to nodules or lesions   Neurologic:  No discomfort, change in mental status, speech, sensory or motor activity   Psychiatric:  No change in concentration or change to affect or mood   Endocrine:  No hot flashes, increased thirst, or change to urine production   Hematologic: No petechiae, ecchymosis or bleeding   Lymphatic:  No lymphadenopathy or lymphedema   Allergy / Immunologic:  No eczema, hives, frequent or recurrent infections          MD CONSULTS/ASSESSMENT AND PLAN:   IM:   Lesions on thoracic spine and R pelvic bones (as seen on CT and bone scan). Concerning given her h/o breast cancer (bilateral resection and radiation in 2015, had not been compliant with femara) and R renal cancer (resection and radiation when she was 4years old). Also, patient says she has unintentionally lost 90 lbs in the last year. Oncology ordered a pelvic CT and bone biopsy. Muscular deconditioning. PT/OT rec'd SNF, but patient might refuse.        Dispo - likely ready for discharge after biopsy is completed      Oncology:   ASSESSMENT AND PLAN:       Breast carcinoma:   -Diagnosis 2014   -T1c, N1, M0   -Tumor size 1.4 cm   -ER positive, WA positive and HER2/edward negative   -Oncotype DX score 8   -Status post lumpectomy and axillary node evaluation.   -Status post radiation   -The patient has not been on Femara since June 2020.   -It is questionable whether she actually has been taking her Femara properly       Back pain:   -CT scan is concerning for bone metastasis   -Bone scan was ordered and demonstrates areas of concern in the pelvis   -Bone biopsy has been performed and pathology is pending   -Interestingly, spine lesions did not show up on the bone scan and these could be lytic lesions which do not show up as well. MEDICATIONS:   Home meds      Prn   Po percocet 7.5/325mg 1 tab x 2            CM: Hospital day 1: Patient on C3 re Dyspnea care managed by IM and Hem/Onc. Patient from home alone with 3 flight of steps to enter. Patient exploring SNF with writecorina godwin referral to [facility] 1st choice and [facility] [place] 2nd choice. Referrals sent. Patient has expressed desire to dc home prior to [place] edu could dc prior to completing rehab if wishes, but staff with concerns on level of function. Of note: Patient had to be staff assist back to bed and used as support to need for placement. SW will follow         [id number]: Spoke with Patient aware both facilities accepting request [facility] [place] start cert, [facility] alerted to pull cert.  SW will follow

## 2022-12-15 NOTE — PROGRESS NOTES
Shift assessment complete as charted. A/O x 4. VSS. NPO at midnight per order. Denies needs. Bed in low position, wheels locked, SR x 2, call light and bedside table within reach.   Electronically signed by Richie Vital RN on 12/15/2022 at 4:00 AM

## 2022-12-15 NOTE — CARE COORDINATION
Hospital day 2 as inpatient: Patient on C3 care managed by IM, Hem/Onc, and GI. Patient with EGD this am, path pending, per okay for outpatient fu. Plans for MRI and Bx. Therapy res split hoem with 24 assist vs SNF concerns 3 flights of steps at home and will not have 24 hr support. Patient accepted at [facility] obtained. SW following. IBETH Bates

## 2022-12-15 NOTE — ANESTHESIA PRE PROCEDURE
Department of Anesthesiology  Preprocedure Note       Name:  Geoff Vines   Age:  60 y.o.  :  1960                                          MRN:  [id number]         Date:  12/15/2022      Surgeon: Bashir Kirkland):  Marie Hernandez MD    Procedure: Procedure(s):  EGD DIAGNOSTIC ONLY    Medications prior to admission:   Prior to Admission medications    Medication Sig Start Date End Date Taking? Authorizing Provider   Liraglutide (VICTOZA) 18 MG/3ML SOPN SC injection INJECT 1.8 MG UNDER THE SKIN ONCE DAILY 10/27/22   Lauren Zuniga MD   insulin lispro, 1 Unit Dial, (HUMALOG/ADMELOG) 100 UNIT/ML SOPN With meals                   - at bedtime    < 150 ---   5 units            0 units  151-200 -- 6 units            0 units  201-250 :  7 units            1 units  251-300:   8 units            2 units  301-350:   9 units            3 units  >350 :       10 units          4 units  Upto 30 units/day 10/27/22   Lauren Zuniga MD   glimepiride (AMARYL) 4 MG tablet One tab daily with breakfast 10/27/22   Lauren Zuniga MD   insulin glargine (LANTUS SOLOSTAR) 100 UNIT/ML injection pen Inject 22 Units into the skin every morning  Patient taking differently: Inject 26 Units into the skin every morning 10/27/22   Lauren Zuniga MD   pioglitazone (ACTOS) 30 MG tablet Take 30 mg by mouth daily 22   Historical Provider, MD   lisinopril (PRINIVIL;ZESTRIL) 40 MG tablet TAKE 1 TABLET BY MOUTH EVERY DAY 22   Lauren Zuniga MD   atorvastatin (LIPITOR) 20 MG tablet TAKE 1 TABLET BY MOUTH EVERY DAY 22   Lauren Zuniga MD   blood glucose test strips (TRUE METRIX BLOOD GLUCOSE TEST) strip As needed.   Patient taking differently: 3 each daily 22   Lauren Zuniga MD   Insulin Pen Needle 32G X 4 MM MISC 1 each by Does not apply route daily 22   Lauren Zuniga MD   Blood Glucose Monitoring Suppl (TRUE METRIX METER) w/Device KIT Used to  check blood sugar 3 times eyad 7/22/22   Marlena Ulloa MD   ticagrelor (BRILINTA) 90 MG TABS tablet TAKE 1 TABLET BY MOUTH TWICE A DAY 4/18/22   Carlos A Wyatt MD   nitroGLYCERIN (NITROSTAT) 0.4 MG SL tablet up to max of 3 total doses. If no relief after 1 dose, call 911. 9/29/21   Damaris Armendariz MD   gabapentin (NEURONTIN) 600 MG tablet Take 0.5 tablets by mouth 2 times daily for 3 days. Patient taking differently: Take 600 mg by mouth 3 times daily. 6/4/21 11/22/21  Sahil Mistry MD   paliperidone (INVEGA) 9 MG extended release tablet Take 9 mg by mouth every morning 3/15/21   Historical Provider, MD   pantoprazole (PROTONIX) 40 MG tablet Take 40 mg by mouth daily  4/3/21   Historical Provider, MD   ferrous sulfate (IRON 325) 325 (65 Fe) MG tablet Take 325 mg by mouth daily (with breakfast)    Historical Provider, MD   oxyCODONE-acetaminophen (PERCOCET) 7.5-325 MG per tablet TAKE 1 TABLET BY MOUTH EVERY 6 (SIX) HOURS AS NEEDED FOR UP TO 28 DAYS. 3/11/21   Historical Provider, MD   clonazePAM (KLONOPIN) 0.5 MG tablet Take 0.5 mg by mouth 3 times daily as needed.  3/15/21   Historical Provider, MD   calcium carbonate-vitamin D (CALTRATE) 600-400 MG-UNIT TABS per tab Take 1 tablet by mouth daily  12/30/19   Historical Provider, MD   aspirin 81 MG tablet Take 81 mg by mouth daily    Historical Provider, MD   traZODone (DESYREL) 100 MG tablet Take 200 mg by mouth nightly     Historical Provider, MD       Current medications:    Current Facility-Administered Medications   Medication Dose Route Frequency Provider Last Rate Last Admin    lactated ringers infusion 1,000 mL  1,000 mL IntraVENous Continuous Bharat Taylor  mL/hr at 12/15/22 0821 1,000 mL at 12/15/22 0821    0.9 % sodium chloride bolus  500 mL IntraVENous Once Mario Price MD        enoxaparin (LOVENOX) injection 40 mg  40 mg SubCUTAneous Daily Mario Price MD        aspirin EC tablet 81 mg  81 mg Oral Daily Mario Price MD  ticagrelor (BRILINTA) tablet 90 mg  90 mg Oral BID Sakshi Son MD        insulin lispro (HUMALOG) injection vial 0-4 Units  0-4 Units SubCUTAneous Nightly David Vickers MD   4 Units at 12/14/22 2025    insulin lispro (HUMALOG) injection vial 0-16 Units  0-16 Units SubCUTAneous TID WC David Vickers MD   16 Units at 12/14/22 1645    insulin glargine (LANTUS) injection vial 30 Units  30 Units SubCUTAneous QAM David Vickers MD   30 Units at 12/14/22 0907    acetaminophen (TYLENOL) tablet 650 mg  650 mg Oral Q4H PRN Zhane Bermudez MD   650 mg at 12/10/22 1529    atorvastatin (LIPITOR) tablet 20 mg  20 mg Oral Daily SARTHAK Gan CNP   20 mg at 12/14/22 0857    clonazePAM (KLONOPIN) tablet 0.5 mg  0.5 mg Oral TID PRN SARTHAK Gan CNP   0.5 mg at 12/14/22 1530    Liraglutide (VICTOZA) SC injection 1.8 mg - PATIENT SUPPLIED (Patient Supplied)  1.8 mg SubCUTAneous Daily SARTHAK Gan CNP        lisinopril (PRINIVIL;ZESTRIL) tablet 40 mg  40 mg Oral Daily SARTHAK Gan CNP   40 mg at 12/14/22 0857    oxyCODONE-acetaminophen (PERCOCET) 7.5-325 MG per tablet 1 tablet  1 tablet Oral Q6H PRN SARTHAK Gan CNP   1 tablet at 12/14/22 2313    paliperidone (INVEGA) extended release tablet 9 mg  9 mg Oral QAM SARTHAK Gan CNP   9 mg at 12/14/22 0857    pantoprazole (PROTONIX) tablet 40 mg  40 mg Oral Daily SARTHAK Gan CNP   40 mg at 12/14/22 0857    traZODone (DESYREL) tablet 200 mg  200 mg Oral Nightly SARTHAK Gan CNP   200 mg at 12/14/22 2024    sodium chloride flush 0.9 % injection 5-40 mL  5-40 mL IntraVENous 2 times per day SARTHAK Gan CNP   10 mL at 12/14/22 2024    sodium chloride flush 0.9 % injection 5-40 mL  5-40 mL IntraVENous PRN SARTHAK Gan CNP        0.9 % sodium chloride infusion   IntraVENous PRN SARTHAK Gan CNP        ondansetron (ZOFRAN-ODT) disintegrating tablet 4 mg  4 mg Oral Q8H PRN Acie Chasten, APRN - CNP        Or    ondansetron (ZOFRAN) injection 4 mg  4 mg IntraVENous Q6H PRN Acie Chasten, APRN - CNP        polyethylene glycol (GLYCOLAX) packet 17 g  17 g Oral Daily PRN Acie Chasten, APRN - CNP        acetaminophen (TYLENOL) tablet 650 mg  650 mg Oral Q6H PRN Acie Chasten, APRN - CNP        Or    acetaminophen (TYLENOL) suppository 650 mg  650 mg Rectal Q6H PRN Acie Chasten, APRN - CNP        glucose chewable tablet 16 g  4 tablet Oral PRN Acie Chasten, APRN - CNP        dextrose bolus 10% 125 mL  125 mL IntraVENous PRN Acie Chasten, APRN - CNP   Stopped at 12/15/22 5280    Or    dextrose bolus 10% 250 mL  250 mL IntraVENous PRN Acie Chasten, APRN - CNP        glucagon (rDNA) injection 1 mg  1 mg SubCUTAneous PRN Acie Chasten, APRN - CNP        dextrose 10 % infusion   IntraVENous Continuous PRN Acie Chasten, APRN - CNP           Allergies: Allergies   Allergen Reactions    Morphine Anaphylaxis and Hives     feels like throat is closing    Penicillins Hives and Swelling    Codeine Hives and Rash    Penicillin G Rash       Problem List:    Patient Active Problem List   Diagnosis Code    Acute hyperglycemia R73.9    Hypertension, uncontrolled I10    Bilateral malignant neoplasm of breast in female ([facility] [address] C50.911, C50.912    Microalbuminuria R80.9    Obesity (BMI 30-39. 9) E66.9    Slurred speech R47.81    Hx of ischemic CVA I63.40    Brain metastases (HCC) C79.31    Carotid stenosis, bilateral:<50%:per US 7/2016 I65.23    SHARRON (acute kidney injury) (HCC) N17.9    Lactic acidosis E87.20    Frequent falls R29.6    Depression/anxiety F41.8    Abnormal brain MRI R90.89    Acute bilateral low back pain without sciatica M54.50    Closed fracture of right ankle, with routine healing, subsequent encounter S82.891D    Stage 3a chronic kidney disease ([facility] [address] N18.31    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 ([facility] [address] E11.59    Dyslipidemia associated with type 2 diabetes mellitus ([facility] [address] E11.69, E78.5    Bipolar disorder [facility] F31.9    Cardiomegaly I51.7    Cardiomyopathy ([facility] I42.9    Carpal tunnel syndrome G56.00    Chronic systolic heart failure (HCC) I50.22    Diffuse cystic mastopathy N60.19    Edema R60.9    Gallstone pancreatitis K85.10    Gout M10.9    History of breast cancer Z85.3    History of renal cell carcinoma Z85.528    Cardiomyopathy in other diseases classified elsewhere I43    Mononeuritis G58.9    Myalgia and myositis [id number]    Nonspecific abnormal electrocardiogram (ECG) (EKG) R94.31    Patient in clinical research study Z00.6    Peroneal muscular atrophy G60.0    Scoliosis (and kyphoscoliosis), idiopathic M41.20    SOBOE (shortness of breath on exertion) R06.02    Syncope and collapse R55    Chronic pain disorder G89.4    DDD (degenerative disc disease), lumbar M51.36    Diabetic polyneuropathy associated with type 2 diabetes mellitus (HCC) E11.42    Other secondary scoliosis, lumbosacral region M41.57    Thoracic spondylosis without myelopathy M47.814    Morbid obesity due to excess calories ([facility]) E66.01    Age-related nuclear cataract of both eyes H25.13    Hypermetropia, bilateral H52.03    Hypertensive retinopathy, bilateral H35.033    Vitreous degeneration, bilateral H43.813    Acute bilateral low back pain with left-sided sciatica M54.42    History of CVA (cerebrovascular accident) without residual deficits Z86.73    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 stage 3 chronic kidney disease (HCC) I13.0, I50.22, N18.30    S/P mastectomy, right Z90.11    Acute cystitis without hematuria N30.00    Hypomagnesemia E83.42    Chest pain R07.9    CAD S/P percutaneous coronary angioplasty I25.10, Z98.61    Hyperglycemia due to type 2 diabetes mellitus (HCC) E11.65    Hx of heart artery stent Z95.5    Nuclear senile cataract H25.10    Unintentional weight loss R63.4    Chronic anemia D64.9    Facial abscess L02.01    Asymptomatic bacteriuria R82.71    Acute encephalopathy G93.40    Tobacco use disorder F17.200    Severe malnutrition (HCC) E43    Acute right eye pain H57.11    Suspected condition R69    Bipolar affective disorder, currently depressed, moderate (HCC) F31.32    Seasonal allergies J30.2    Symptomatic bradycardia R00.1    Dyspnea R06.00    Diffuse pain R52       Past Medical History:        Diagnosis Date    Abnormal brain MRI 7/20/2017    Partially empty sella and minimal chronic small vessel ischemic disease    Acute bilateral low back pain without sciatica 11/2/2016    SHARRON (acute kidney injury) ([facility] [address] 7/5/2017    Arthritis     back    Bipolar disorder ([facility] [address] 10/18/2008    CAD (coronary artery disease)     stent placed 6/8/20    Cancer [facility]) 2015    bilateral breast:s/p lumpectomy/radiation:under care care of breast specialist:Dr. Boone     Carotid stenosis, bilateral:<50%:per US 7/2016 7/15/2016    Carpal tunnel syndrome 10/18/2008    Cervical cancer screening 2014    Nml per pt'.     Coronary artery disease of native artery of native heart with stable angina pectoris ([facility] [address] 6/9/2020    DDD (degenerative disc disease), lumbar 7/18/2018    Depression     under care of pschiatrist:Dr. Zunilda Reddy    Depression/anxiety 7/5/2017    Depression/anxiety     Diabetes mellitus ([facility] [address]     Gout     History of mammogram 10/28/2016;8/14/17    Negative    History of therapeutic radiation     Hyperlipidemia     Hypertension     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 stage 3 chronic kidney disease ([address] 9/17/2017    Microalbuminuria 7/1/2016    Neuropathy in diabetes ([facility] [address]     Non morbid obesity 7/1/2016    Pancreatitis 5/12/16    [facility] hospitalization 5/12/16-5/16/16:under care of GI:chronic pancreatitis    S/P endoscopy 6/14/2016 [facility]:per pt' & her family member was nml.     Scoliosis     Spondylosis of lumbar region without myelopathy or radiculopathy 3/10/2017    Transient cerebral ischemia 07/15/2016    TIA:7/10/16    Unspecified cerebral artery occlusion with cerebral infarction     TIA       Past Surgical History:        Procedure Laterality Date    BREAST LUMPECTOMY      Bilateral:breast cancer    CARDIAC CATHETERIZATION  2020    Dr. Ignacia Denis), DAYANA to Diag 1    COLONOSCOPY N/A 2021    COLONOSCOPY DIAGNOSTIC performed by Angy Pino MD at [place] [address]  2/3/2021    CT BONE MARROW BIOPSY 2/3/2021 Alphonse Haywood MD [facility] CT SCAN    HYSTERECTOMY (CERVIX STATUS UNKNOWN)      Benign:no cervical cancer per pt'    KIDNEY REMOVAL      right    OTHER SURGICAL HISTORY Right     orif right ankle    TEMPORAL ARTERY BIOPSY Right 2021    RIGHT TEMPORAL ARTERY BIOPSY LIGATION performed by Branden Warner MD at [facility] ENDOSCOPY N/A 2021    EGD BIOPSY performed by Angy Pino MD at [address] [place] [address] History:    Social History     Tobacco Use    Smoking status: Former     Packs/day: 0.50     Years: 20.00     Pack years: 10.00     Types: Cigarettes, Cigars     Quit date: 7/3/2014     Years since quittin.4    Smokeless tobacco: Never   Substance Use Topics    Alcohol use: No     Alcohol/week: 0.0 standard drinks                                Counseling given: Not Answered      Vital Signs (Current):   Vitals:    222 22 2313 12/15/22 0408 12/15/22 0745   BP: 123/65 (!) 108/45 (!) 95/40 (!) 121/47   Pulse: 56 54 52 (!) 46   Resp: 18 17 17 20   Temp: 98.9 °F (37.2 °C) 98.6 °F (37 °C) 97.6 °F (36.4 °C) 97.3 °F (36.3 °C)   TempSrc: Oral Oral Oral Temporal   SpO2: 97% 96% 97% 100%   Weight:       Height:                                                  BP Readings from Last 3 Encounters:   12/15/22 (!) 121/47   11/11/22 (!) 161/73   09/27/22 (!) 162/78       NPO Status: Time of last liquid consumption: 1930                        Time of last solid consumption: 1930                        Date of last liquid consumption: 12/14/22                        Date of last solid food consumption: 12/14/22    BMI:   Wt Readings from Last 3 Encounters:   12/12/22 113 lb (51.3 kg)   11/11/22 113 lb 3.2 oz (51.3 kg)   09/27/22 111 lb 9.6 oz (50.6 kg)     Body mass index is 20.02 kg/m².     CBC:   Lab Results   Component Value Date/Time    WBC 5.9 12/15/2022 05:33 AM    RBC 3.19 12/15/2022 05:33 AM    HGB 9.5 12/15/2022 05:33 AM    HCT 29.0 12/15/2022 05:33 AM    MCV 91.1 12/15/2022 05:33 AM    RDW 12.2 12/15/2022 05:33 AM     12/15/2022 05:33 AM       CMP:   Lab Results   Component Value Date/Time     12/11/2022 03:08 PM    K 4.5 12/11/2022 03:08 PM    K 4.3 12/11/2022 04:45 AM     12/11/2022 03:08 PM    CO2 27 12/11/2022 03:08 PM    BUN 14 12/13/2022 10:05 AM    CREATININE 1.1 12/13/2022 10:05 AM    GFRAA >60 09/27/2022 09:41 AM    GFRAA >60 05/29/2013 01:53 PM    AGRATIO 1.4 12/10/2022 04:06 PM    LABGLOM 56 12/13/2022 10:05 AM    GLUCOSE 369 12/11/2022 03:08 PM    PROT 5.7 12/14/2022 05:12 AM    PROT 8.1 01/05/2013 10:34 PM    CALCIUM 8.6 12/11/2022 03:08 PM    BILITOT 0.5 12/14/2022 05:12 AM    ALKPHOS 167 12/14/2022 05:12 AM    AST 29 12/14/2022 05:12 AM    ALT 44 12/14/2022 05:12 AM       POC Tests:   Recent Labs     12/15/22  0755   POCGLU 104*       Coags:   Lab Results   Component Value Date/Time    PROTIME 12.8 12/14/2022 05:12 AM    INR 0.97 12/14/2022 05:12 AM    APTT 24.8 12/11/2022 04:45 AM       HCG (If Applicable): No results found for: PREGTESTUR, PREGSERUM, HCG, HCGQUANT     ABGs:   Lab Results   Component Value Date/Time    PHART 7.414 06/26/2015 11:10 AM    PO2ART 66.8 06/26/2015 11:10 AM    ORO8GOC 42.0 06/26/2015 11:10 AM    TSS8NRO 26.3 06/26/2015 11:10 AM    BEART 1.5 06/26/2015 11:10 AM    H9ZGTVVS 93.4 06/26/2015 11:10 AM        Type & Screen (If Applicable):  No results found for: LABABO, LABRH    Drug/Infectious Status (If Applicable):  No results found for: HIV, HEPCAB    COVID-19 Screening (If Applicable):   Lab Results   Component Value Date/Time    COVID19 NOT DETECTED 12/10/2022 12:57 PM    COVID19 Not Detected 01/28/2021 12:41 PM           Anesthesia Evaluation    Airway: Mallampati: II  TM distance: >3 FB   Neck ROM: full  Mouth opening: > = 3 FB   Dental:    (+) upper dentures      Pulmonary:normal exam  breath sounds clear to auscultation  (+) shortness of breath:                             Cardiovascular:    (+) hypertension:, angina: no interval change, CAD: obstructive, CABG/stent: no interval change, CHF:, CHAMBERS:,         Rhythm: regular  Rate: normal                    Neuro/Psych:   (+) CVA:, neuromuscular disease:, TIA, psychiatric history:            GI/Hepatic/Renal:   (+) GERD: well controlled,           Endo/Other:    (+) Diabetes, . Abdominal:             Vascular: Other Findings:           Anesthesia Plan      MAC     ASA 4 - emergent     (I discussed with the patient the risks and benefits of PIV, anesthesia, IV Narcotics, PACU. All questions were answered the patient agrees with the plan and wishes to proceed)  Induction: intravenous.                             Shikha Chaney MD   12/15/2022

## 2022-12-15 NOTE — PROGRESS NOTES
Hospitalist Progress Note      PCP: Araceli Leyva    Date of Admission: 12/10/2022    Chief Complaint: pain all over       Subjective:  She complains of back pain. Medications:  Reviewed    Infusion Medications    lactated ringers 100 mL/hr at 12/15/22 0913    sodium chloride      dextrose       Scheduled Medications    sodium chloride  500 mL IntraVENous Once    enoxaparin  40 mg SubCUTAneous Daily    aspirin  81 mg Oral Daily    ticagrelor  90 mg Oral BID    insulin lispro  0-4 Units SubCUTAneous Nightly    insulin lispro  0-16 Units SubCUTAneous TID WC    insulin glargine  30 Units SubCUTAneous QAM    atorvastatin  20 mg Oral Daily    Liraglutide  1.8 mg SubCUTAneous Daily    lisinopril  40 mg Oral Daily    paliperidone  9 mg Oral QAM    pantoprazole  40 mg Oral Daily    traZODone  200 mg Oral Nightly    sodium chloride flush  5-40 mL IntraVENous 2 times per day     PRN Meds: acetaminophen, clonazePAM, oxyCODONE-acetaminophen, sodium chloride flush, sodium chloride, ondansetron **OR** ondansetron, polyethylene glycol, acetaminophen **OR** acetaminophen, glucose, dextrose bolus **OR** dextrose bolus, glucagon (rDNA), dextrose      Intake/Output Summary (Last 24 hours) at 12/15/2022 1148  Last data filed at 12/15/2022 1022  Gross per 24 hour   Intake 520 ml   Output --   Net 520 ml       Physical Exam Performed:    /63   Pulse 50   Temp 97.3 °F (36.3 °C) (Temporal)   Resp 15   Ht 5' 3\" (1.6 m)   Wt 113 lb (51.3 kg)   SpO2 100%   BMI 20.02 kg/m²     General appearance: No apparent distress, appears stated age. HEENT: Pupils equal, round, and reactive to light. Conjunctivae/corneas clear. Neck: Supple, with full range of motion. No jugular venous distention. Trachea midline. Respiratory:  Normal respiratory effort. Clear to auscultation, bilaterally without Rales/Wheezes/Rhonchi. Cardiovascular: Regular rate and rhythm with normal S1/S2 without murmurs, rubs or gallops.   Abdomen: Soft, non-tender, non-distended with normal bowel sounds. Musculoskeletal: No clubbing, cyanosis or edema bilaterally. Full range of motion without deformity. Skin: Skin color, texture, turgor normal.  No rashes or lesions. Neurologic:  Neurovascularly intact without any focal sensory/motor deficits. Cranial nerves: II-XII intact, grossly non-focal.  Psychiatric: Alert and oriented, thought content appropriate, normal insight  Capillary Refill: Brisk, 3 seconds, normal   Peripheral Pulses: +2 palpable, equal bilaterally       Labs:   Recent Labs     12/14/22  0512 12/15/22  0533   WBC 4.7 5.9   HGB 8.8* 9.5*   HCT 27.7* 29.0*    156     Recent Labs     12/13/22  1005   BUN 14   CREATININE 1.1     Recent Labs     12/14/22  0512   AST 29   ALT 44*   BILIDIR <0.2   BILITOT 0.5   ALKPHOS 167*     Recent Labs     12/14/22  0512   INR 0.97     No results for input(s): Lucia Maciel in the last 72 hours. Urinalysis:      Lab Results   Component Value Date/Time    NITRU Negative 12/10/2022 01:42 PM    WBCUA 3-5 12/10/2022 01:42 PM    BACTERIA 2+ 12/10/2022 01:42 PM    RBCUA 5-10 12/10/2022 01:42 PM    BLOODU MODERATE 12/10/2022 01:42 PM    SPECGRAV >=1.030 12/10/2022 01:42 PM    GLUCOSEU >=1000 12/10/2022 01:42 PM    GLUCOSEU >=1000 mg/dL 06/07/2010 03:38 PM       Radiology:  CT ABDOMEN PELVIS W IV CONTRAST Additional Contrast? None   Final Result   Questionable asymmetric sclerosis of the anterior portion of the right iliac   wing compared to the left. This could correspond to the findings on bone   scan. However the finding is equivocal.  Consider noncontrast pelvic MRI for   further characterization given the equivocal findings on CT scan compared to   the bone scan      Mild pancreatic ductal dilatation and intrahepatic biliary ductal dilatation,   presumably secondary to chronic calcific pancreatitis with stones in the   pancreatic duct and possibly in the distal common duct.       Surgically absent right kidney. Wall thickening of the sigmoid colon in the pelvis, likely due to the   partially contracted state of the colon in the absence of clinical signs of   colitis. NM BONE SCAN WHOLE BODY   Preliminary Result   Findings suspicious for osseous metastatic disease in the anterior right   iliac bone and right superior pubic ramus. Radiographic correlate   recommended. CT CHEST PULMONARY EMBOLISM W CONTRAST   Final Result   1. No pulmonary embolism identified. 2.  Apparent linear filling defect within the mid descending thoracic aorta   is thought to be artifactual, rather than due to intimal tear/focal   dissection. If there is clinical concern for acute aortic dissection, CTA   chest dissection protocol would be recommended for further evaluation. 3.  Abnormal extensive mixed sclerosis/lucency throughout the spine. Differential considerations include myeloma versus metastases. Clinical   correlation recommended. 4.  Additional nonemergent findings, as above. XR CHEST PORTABLE   Final Result   No acute process.          IR FLUORO GUIDED NEEDLE PLACEMENT    (Results Pending)   MRI PELVIS WO CONTRAST    (Results Pending)   MRI THORACIC SPINE W WO CONTRAST    (Results Pending)   MRI LUMBAR SPINE W [address]    (Results Pending)   MRI ABDOMEN WO CONTRAST MRCP    (Results Pending)       IP CONSULT TO CARDIOTHORACIC SURGERY  IP CONSULT TO HOSPITALIST  IP CONSULT TO HEART FAILURE NURSE/COORDINATOR  IP CONSULT TO DIETITIAN  IP CONSULT TO ONCOLOGY  IP CONSULT TO SPIRITUAL SERVICES  IP CONSULT TO GI    Assessment/Plan:    Active Hospital Problems    Diagnosis     Diffuse pain [R52]      Priority: Medium    Dyspnea [R06.00]      Priority: Medium    Chronic anemia [D64.9]     Hyperglycemia due to type 2 diabetes mellitus ([facility] [address] [E11.65]     Hypertension, uncontrolled [I10]        Queenie Fang is a 40-year-old female with significant past medical history of hypertension, hyperlipidemia, chronic kidney disease, and CAD who presents to [facility] emergency department with a constellation of vague symptoms such as generalized body pains and chronic upper back pain. She is unable to define an onset of these pains, she states back pain has been for many years, and the body aches have been coming and going for \"long time. \"      Lesions on thoracic spine and R pelvic bones. Concerning given her h/o breast cancer (bilateral resection and radiation in 2015, had not been compliant with femara). Also had R renal cancer (resection and radiation when she was 4years old). Also, the patient unintentionally lost 90 lbs in the last few years, but this might have been due to her previous diabetic medications and weight has been stable since 2/2022. Oncology ordered a bone biopsy, but first IR requested a pelvic CT, then an MRI of her T+L spine and pelvis (currently awaiting). Possible stones in the pancreatic duct on CT. GI consulted. EGD unremarkable 12/15. F/u MRCP, Ca 19-9. I see that her CEA was ordered and was elevated, defer further management of this to GI. Muscular deconditioning. PT/OT rec'd SNF, but patient might refuse. DM2. Insulin regimen. A1c recently 14.9, doubtful compliance at home. HTN. Lisinopril.    CAD. Aspirin, ticagrelor, statin. She is on paliperidone. I am uncertain as to her exact psych diagnosis, she says it is for bipolar disorder. Also trazodone and clonazepam.      Aortic dissection ruled out. CT showed artifact per CT surgery. DVT Prophylaxis: enoxaparin  Diet: ADULT DIET; Regular; 5 carb choices (75 gm/meal); Low Fat/Low Chol/High Fiber/FAUZIA; No Added Salt (3-4 gm)  Code Status: Full Code  PT/OT Eval Status: rec'd SNF vs home OT    Dispo - After her MRI's she can discharge when either the biopsy is either completed (f/u results as outpatient) or perhaps when the biopsy is no longer planned. Maybe 12/16?   She lives at home and is considering SNF.     Appropriate for A1 Discharge Unit: Amee Dinh MD

## 2022-12-15 NOTE — OP NOTE
Esophagogastroduodenoscopy Note    Patient:   Moni Gonzalez    YOB: 1960    Facility:   [facility] [Inpatient]   Referring/PCP: Van Irwin    Procedure:   Esophagogastroduodenoscopy --diagnostic  Date:     12/15/2022   Endoscopist:  Suyapa Yoo MD     Preoperative Diagnosis: Worsening in her chronic Fe def anemia  Postoperative Diagnosis:  normal    Anesthesia:  MAC  Estimated blood loss: Minimal    Complications: None    Description of Procedure:  Informed consent was obtained from the patient after explanation of the procedure including indications, description of the procedure,  benefits and possible risks and complications of the procedure, and alternatives. Questions were answered. The patient's history was reviewed and a directed physical examination was performed prior to the procedure. Patient was monitored throughout the procedure with pulse oximetry and periodic assessment of vital signs. Patient was sedated as noted above. The Nursing staff and I performed a time out. With the patient in the left lateral decubitus position, the Olympus videoendoscope was placed in the patient's mouth and under direct visualization passed into the esophagus. The scope was ultimately passed to the third portion of the duodenum. Visualization was performed during both introduction and withdrawal of the endoscope and retroflexed view of the proximal stomach was obtained. Findings[de-identified]   Esophagus: normal. The findings do not support a diagnosis of Gabriel's Esophagus. Stomach: normal  Duodenum: normal, biopsied to R/O celiac    Recommendations:   -Await pathology. ,   -Will schedule an outpatient colonoscopy upon discharge  -OK to D/C from GI standpoint    Suyapa Yoo MD       O) [phone]        Suyapa Yoo MD, MD

## 2022-12-15 NOTE — H&P
Pre-sedation Assessment    History and Physical / Pre-Sedation Assessment  Patient:  Jeanne Fernando   :   1960     Intended Procedure: EGD      HPI:   1. Chronic CBD and pancreatic ductal dilation. Ct imaging now questioning distal CBD stones and pancreatic duct stones. Bilirubin normal, alk phos chronically elevated. 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 2. Weight loss. Objective data shows 90 lbs weight loss in . Diabetic medications adjusted for this reason, and weight has been stable since 2022. 3. Change in bowel habits, diarrhea over the past year. No longer on metformin. Possible pancreatic insufficiency. 4. Chronic normocytic anemia. On oral iron pta. Iron studies have looked okay in the past. Unremarkable EGD . Poor colon prep. 5. Abnormal imaging spine and pelvis. Concern for mets. Oncology consulted. Bone biopsy pending. 6. Hx breat cancer s/p lumpectomy and radiation. Has not been taking Femara since 2020.   7. Hx kidney cancer. 8. CAD on Brilinta. Plan:  1. Obtain MRCP, CEA, Ca 19-9.  2. Will repeat an EGD. Outpatient colonoscopy. 3. Check pancreatic elastase.     Current Facility-Administered Medications   Medication Dose Route Frequency Provider Last Rate Last Admin    lactated ringers infusion 1,000 mL  1,000 mL IntraVENous Continuous Nurys Mena  mL/hr at 12/15/22 0821 1,000 mL at 12/15/22 0821    0.9 % sodium chloride bolus  500 mL IntraVENous Once Devendra Truong MD        enoxaparin (LOVENOX) injection 40 mg  40 mg SubCUTAneous Daily Devendra Truong MD        aspirin EC tablet 81 mg  81 mg Oral Daily Devendra Truong MD        ticagrelor (BRILINTA) tablet 90 mg  90 mg Oral BID Devendra Truong MD        insulin lispro (HUMALOG) injection vial 0-4 Units  0-4 Units SubCUTAneous Nightly David Vickers MD   4 Units at 22    insulin lispro (HUMALOG) injection vial 0-16 Units  0-16 Units SubCUTAneous TID  Luba Mack MD 16 Units at 12/14/22 1645    insulin glargine (LANTUS) injection vial 30 Units  30 Units SubCUTAneous QAM David Vickers MD   30 Units at 12/14/22 9808    acetaminophen (TYLENOL) tablet 650 mg  650 mg Oral Q4H PRN Javid Olivares MD   650 mg at 12/10/22 1529    atorvastatin (LIPITOR) tablet 20 mg  20 mg Oral Daily SARTHAK Vann - CNP   20 mg at 12/14/22 0857    clonazePAM (KLONOPIN) tablet 0.5 mg  0.5 mg Oral TID PRN SARTHAK Vann - CNP   0.5 mg at 12/14/22 1530    Liraglutide (VICTOZA) SC injection 1.8 mg - PATIENT SUPPLIED (Patient Supplied)  1.8 mg SubCUTAneous Daily SARTHAK Vann CNP        lisinopril (PRINIVIL;ZESTRIL) tablet 40 mg  40 mg Oral Daily Jcarlos Castellano APRN - CNP   40 mg at 12/14/22 0857    oxyCODONE-acetaminophen (PERCOCET) 7.5-325 MG per tablet 1 tablet  1 tablet Oral Q6H PRN Jcarlos Castellano APRN - CNP   1 tablet at 12/14/22 2313    paliperidone (INVEGA) extended release tablet 9 mg  9 mg Oral QAM Jcarlos Castellano, APRN - CNP   9 mg at 12/14/22 0857    pantoprazole (PROTONIX) tablet 40 mg  40 mg Oral Daily Jcarlos Gray, APRN - CNP   40 mg at 12/14/22 0857    traZODone (DESYREL) tablet 200 mg  200 mg Oral Nightly Jcarlos MISHEL CastellanoN - CNP   200 mg at 12/14/22 2024    sodium chloride flush 0.9 % injection 5-40 mL  5-40 mL IntraVENous 2 times per day Jcarlos SARTHAK Castellano - CNP   10 mL at 12/14/22 2024    sodium chloride flush 0.9 % injection 5-40 mL  5-40 mL IntraVENous PRN Jcarlos Castellano APRNALINI - CNP        0.9 % sodium chloride infusion   IntraVENous PRN Jcarlos SARTHAK Castellano - CNP        ondansetron (ZOFRAN-ODT) disintegrating tablet 4 mg  4 mg Oral Q8H PRN Jcarlos [place], APRN - CNP        Or    ondansetron (ZOFRAN) injection 4 mg  4 mg IntraVENous Q6H PRN SARTHAK Vann - PIETER        polyethylene glycol (GLYCOLAX) packet 17 g  17 g Oral Daily PRN SARTHAK Vann - PIETER        acetaminophen (TYLENOL) tablet 650 mg  650 mg Oral Q6H PRN Clay TARIQ Shi Daveyer, APRN - CNP        Or    acetaminophen (TYLENOL) suppository 650 mg  650 mg Rectal Q6H PRN Chely Pitcher, APRN - CNP        glucose chewable tablet 16 g  4 tablet Oral PRN Chely Pitcher, APRN - CNP        dextrose bolus 10% 125 mL  125 mL IntraVENous PRN Chely Pitcher, APRN - CNP   Stopped at 12/15/22 7924    Or    dextrose bolus 10% 250 mL  250 mL IntraVENous PRN Chely Pitcher, APRN - CNP        glucagon (rDNA) injection 1 mg  1 mg SubCUTAneous PRN Chely Pitcher, APRN - CNP        dextrose 10 % infusion   IntraVENous Continuous PRN Chely Pitcher, APRN - CNP         Past Medical History:   Diagnosis Date    Abnormal brain MRI 7/20/2017    Partially empty sella and minimal chronic small vessel ischemic disease    Acute bilateral low back pain without sciatica 11/2/2016    SHARRON (acute kidney injury) ([facility] [address] 7/5/2017    Arthritis     back    Bipolar disorder ([facility] [address] 10/18/2008    CAD (coronary artery disease)     stent placed 6/8/20    Cancer ([facility] [address] 2015    bilateral breast:s/p lumpectomy/radiation:under care care of breast specialist:Dr. Boone     Carotid stenosis, bilateral:<50%:per US 7/2016 7/15/2016    Carpal tunnel syndrome 10/18/2008    Cervical cancer screening 2014    Nml per pt'.     Coronary artery disease of native artery of native heart with stable angina pectoris ([facility] [address] 6/9/2020    DDD (degenerative disc disease), lumbar 7/18/2018    Depression     under care of pschiatrist:Dr. 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 myelopathy or radiculopathy 3/10/2017    Transient cerebral ischemia 07/15/2016    TIA:7/10/16    Unspecified cerebral artery occlusion with cerebral infarction     TIA     Past Surgical History:   Procedure Laterality Date    BREAST LUMPECTOMY  2015    Bilateral:breast cancer    CARDIAC CATHETERIZATION  06/08/2020    Dr. Julieth Anaya), DAYANA to Diag 1    COLONOSCOPY N/A 2/1/2021    COLONOSCOPY DIAGNOSTIC performed by Connye Mohs, MD at [address]  2/3/2021    CT BONE MARROW BIOPSY 2/3/2021 Clabe Schlatter, MD [facility] CT SCAN    HYSTERECTOMY (CERVIX STATUS UNKNOWN)      Benign:no cervical cancer per pt'    KIDNEY REMOVAL      right    OTHER SURGICAL HISTORY Right     orif right ankle    TEMPORAL ARTERY BIOPSY Right 8/9/2021    RIGHT TEMPORAL ARTERY BIOPSY LIGATION performed by Lu Garduno MD at [address] [place] [address] 1/29/2021    EGD BIOPSY performed by Connye Mohs, MD at [place] [address] notes reviewed and agreed. Medications reviewed  Allergies: Allergies   Allergen Reactions    Morphine Anaphylaxis and Hives     feels like throat is closing    Penicillins Hives and Swelling    Codeine Hives and Rash    Penicillin G Rash           Physical Exam:  Vital Signs: BP (!) 121/47   Pulse (!) 46   Temp 97.3 °F (36.3 °C) (Temporal)   Resp 20   Ht 5' 3\" (1.6 m)   Wt 113 lb (51.3 kg)   SpO2 100%   BMI 20.02 kg/m²  Body mass index is 20.02 kg/m².   Airway:Normal  Cardiac:Normal  Pulmonary:Normal  Abdomen:Normal  Specific to procedure: none      Pre-Procedure Assessment/Plan:  ASA 3 - Patient with moderate systemic disease with functional limitations  Mallampati II  Level of Sedation Plan:Deep sedation    Post Procedure plan: Return to same level of care    I assessed the patient and find that the patient is in satisfactory condition to proceed with the planned procedure and sedation plan. I have explained the risk, benefits, and alternatives to the procedure. The patient understands and agrees to proceed.   Yes    Bharat Taylor MD       (O) [phone]        Bharat Taylor MD  9:03 AM 12/15/2022

## 2022-12-16 ENCOUNTER — APPOINTMENT (OUTPATIENT)
Dept: MRI IMAGING | Age: 62
DRG: 092 | End: 2022-12-16
Payer: COMMERCIAL

## 2022-12-16 VITALS
BODY MASS INDEX: 20.02 KG/M2 | HEIGHT: 63 IN | OXYGEN SATURATION: 98 % | TEMPERATURE: 98.5 F | WEIGHT: 113 LBS | HEART RATE: 56 BPM | DIASTOLIC BLOOD PRESSURE: 75 MMHG | RESPIRATION RATE: 16 BRPM | SYSTOLIC BLOOD PRESSURE: 178 MMHG

## 2022-12-16 LAB
ALBUMIN SERPL-MCNC: 2.5 G/DL (ref 3.1–4.9)
ALPHA-1-GLOBULIN: 0.2 G/DL (ref 0.2–0.4)
ALPHA-2-GLOBULIN: 0.6 G/DL (ref 0.4–1.1)
BETA GLOBULIN: 1.4 G/DL (ref 0.9–1.6)
CA 19-9: 34 U/ML (ref 0–35)
GAMMA GLOBULIN: 1 G/DL (ref 0.6–1.8)
GLUCOSE BLD-MCNC: 167 MG/DL (ref 70–99)
GLUCOSE BLD-MCNC: 174 MG/DL (ref 70–99)
GLUCOSE BLD-MCNC: 175 MG/DL (ref 70–99)
GLUCOSE BLD-MCNC: 182 MG/DL (ref 70–99)
GLUCOSE BLD-MCNC: 265 MG/DL (ref 70–99)
GLUCOSE BLD-MCNC: 458 MG/DL (ref 70–99)
KAPPA, FREE LIGHT CHAINS, SERUM: 66.23 MG/L (ref 3.3–19.4)
KAPPA/LAMBDA RATIO: 1.99 (ref 0.26–1.65)
KAPPA/LAMBDA TEST COMMENT: ABNORMAL
LAMBDA, FREE LIGHT CHAINS, SERUM: 33.27 MG/L (ref 5.71–26.3)
PERFORMED ON: ABNORMAL

## 2022-12-16 PROCEDURE — 74181 MRI ABDOMEN W/O CONTRAST: CPT

## 2022-12-16 PROCEDURE — 6370000000 HC RX 637 (ALT 250 FOR IP): Performed by: INTERNAL MEDICINE

## 2022-12-16 PROCEDURE — 72195 MRI PELVIS W/O DYE: CPT

## 2022-12-16 PROCEDURE — 2580000003 HC RX 258: Performed by: INTERNAL MEDICINE

## 2022-12-16 PROCEDURE — 2580000003 HC RX 258: Performed by: NURSE PRACTITIONER

## 2022-12-16 PROCEDURE — 6370000000 HC RX 637 (ALT 250 FOR IP): Performed by: NURSE PRACTITIONER

## 2022-12-16 RX ORDER — CLONAZEPAM 0.5 MG/1
0.5 TABLET ORAL 3 TIMES DAILY PRN
Qty: 20 TABLET | Refills: 3 | Status: SHIPPED | OUTPATIENT
Start: 2022-12-16 | End: 2022-12-26

## 2022-12-16 RX ORDER — SODIUM CHLORIDE, SODIUM LACTATE, POTASSIUM CHLORIDE, AND CALCIUM CHLORIDE .6; .31; .03; .02 G/100ML; G/100ML; G/100ML; G/100ML
250 INJECTION, SOLUTION INTRAVENOUS ONCE
Status: COMPLETED | OUTPATIENT
Start: 2022-12-16 | End: 2022-12-16

## 2022-12-16 RX ORDER — OXYCODONE AND ACETAMINOPHEN 7.5; 325 MG/1; MG/1
1 TABLET ORAL EVERY 6 HOURS PRN
Qty: 25 TABLET | Refills: 0 | Status: SHIPPED | OUTPATIENT
Start: 2022-12-16 | End: 2022-12-26

## 2022-12-16 RX ORDER — GABAPENTIN 600 MG/1
600 TABLET ORAL 3 TIMES DAILY
Qty: 30 TABLET | Refills: 0 | Status: SHIPPED | OUTPATIENT
Start: 2022-12-16 | End: 2022-12-26

## 2022-12-16 RX ORDER — INSULIN GLARGINE 100 [IU]/ML
30 INJECTION, SOLUTION SUBCUTANEOUS EVERY MORNING
Qty: 10 ML | Refills: 3 | Status: SHIPPED | OUTPATIENT
Start: 2022-12-17

## 2022-12-16 RX ORDER — INSULIN LISPRO 100 [IU]/ML
0-8 INJECTION, SOLUTION INTRAVENOUS; SUBCUTANEOUS EVERY 4 HOURS
Status: DISCONTINUED | OUTPATIENT
Start: 2022-12-16 | End: 2022-12-16 | Stop reason: HOSPADM

## 2022-12-16 RX ADMIN — PANTOPRAZOLE SODIUM 40 MG: 40 TABLET, DELAYED RELEASE ORAL at 08:34

## 2022-12-16 RX ADMIN — ATORVASTATIN CALCIUM 20 MG: 10 TABLET, FILM COATED ORAL at 08:33

## 2022-12-16 RX ADMIN — SODIUM CHLORIDE, PRESERVATIVE FREE 10 ML: 5 INJECTION INTRAVENOUS at 08:34

## 2022-12-16 RX ADMIN — INSULIN LISPRO 8 UNITS: 100 INJECTION, SOLUTION INTRAVENOUS; SUBCUTANEOUS at 16:18

## 2022-12-16 RX ADMIN — SODIUM CHLORIDE, POTASSIUM CHLORIDE, SODIUM LACTATE AND CALCIUM CHLORIDE 1000 ML: 600; 310; 30; 20 INJECTION, SOLUTION INTRAVENOUS at 09:50

## 2022-12-16 RX ADMIN — TICAGRELOR 90 MG: 90 TABLET ORAL at 09:52

## 2022-12-16 RX ADMIN — ASPIRIN 81 MG: 81 TABLET, COATED ORAL at 08:40

## 2022-12-16 RX ADMIN — SODIUM CHLORIDE, POTASSIUM CHLORIDE, SODIUM LACTATE AND CALCIUM CHLORIDE 1000 ML: 600; 310; 30; 20 INJECTION, SOLUTION INTRAVENOUS at 01:18

## 2022-12-16 RX ADMIN — OXYCODONE AND ACETAMINOPHEN 1 TABLET: 7.5; 325 TABLET ORAL at 10:00

## 2022-12-16 RX ADMIN — SODIUM CHLORIDE, POTASSIUM CHLORIDE, SODIUM LACTATE AND CALCIUM CHLORIDE 250 ML: 600; 310; 30; 20 INJECTION, SOLUTION INTRAVENOUS at 01:40

## 2022-12-16 RX ADMIN — LISINOPRIL 40 MG: 20 TABLET ORAL at 16:18

## 2022-12-16 RX ADMIN — INSULIN GLARGINE 30 UNITS: 100 INJECTION, SOLUTION SUBCUTANEOUS at 09:47

## 2022-12-16 RX ADMIN — PALIPERIDONE 9 MG: 3 TABLET, EXTENDED RELEASE ORAL at 09:48

## 2022-12-16 ASSESSMENT — PAIN SCALES - GENERAL
PAINLEVEL_OUTOF10: 0
PAINLEVEL_OUTOF10: 9

## 2022-12-16 ASSESSMENT — PAIN DESCRIPTION - DESCRIPTORS: DESCRIPTORS: ACHING

## 2022-12-16 ASSESSMENT — PAIN DESCRIPTION - ORIENTATION: ORIENTATION: LOWER

## 2022-12-16 ASSESSMENT — PAIN DESCRIPTION - LOCATION: LOCATION: BACK

## 2022-12-16 NOTE — PLAN OF CARE
Problem: Pain  Goal: Verbalizes/displays adequate comfort level or baseline comfort level  Outcome: Progressing  Flowsheets (Taken 12/14/2022 2313 by Peggy Vergara RN)  Verbalizes/displays adequate comfort level or baseline comfort level:   Encourage patient to monitor pain and request assistance   Assess pain using appropriate pain scale   Administer analgesics based on type and severity of pain and evaluate response   Implement non-pharmacological measures as appropriate and evaluate response   Consider cultural and social influences on pain and pain management   Notify Licensed Independent Practitioner if interventions unsuccessful or patient reports new pain  Note: Denies pain. Problem: Safety - Adult  Goal: Free from fall injury  Outcome: Progressing  Flowsheets (Taken 12/13/2022 1034 by [place] RN)  Free From Fall Injury: Instruct family/caregiver on patient safety  Note: Remains free from fall. Problem: Skin/Tissue Integrity  Goal: Absence of new skin breakdown  Description: 1. Monitor for areas of redness and/or skin breakdown  2. Assess vascular access sites hourly  3. Every 4-6 hours minimum:  Change oxygen saturation probe site  4. Every 4-6 hours:  If on nasal continuous positive airway pressure, respiratory therapy assess nares and determine need for appliance change or resting period. Outcome: Progressing  Note: Free from skin breakdown.

## 2022-12-16 NOTE — PROGRESS NOTES
Patient taken by bryce to [facility] [place] with all belongings.

## 2022-12-16 NOTE — CARE COORDINATION
CASE MANAGEMENT DISCHARGE SUMMARY    Discharge to: [facility] completed: yes, good thru 12/20 per rep  Lorri Ludwig [address] Notification (HENS) completed: yes    IMM given: (date) NA KATIE    New Durable Medical Equipment ordered/agency: [facility]    Transportation: Medical Transport explained to [employer]. Pt/family voice no agency preference. Agency used:[facility]   time:1600   Ambulance form completed: Yes    Confirmed discharge plan with: Patient     Facility/Agency, name:  [address] [place] [address] faxed [phone]    Phone number for report to facility: [phone]    RN, name: Bryanna Ragland RN    Note: Discharging nurse to complete CHINO, reconcile AVS, and place final copy with patient's discharge packet. RN to ensure that written prescriptions for  Level II medications are sent with patient to the facility as per protocol.   IBETH Daugherty

## 2022-12-16 NOTE — DISCHARGE SUMMARY
Hospital Medicine Discharge Summary    Patient ID: oMni Gonzalez      Patient's PCP: Marti Kemp Date: 12/10/2022     Discharge Date:   12/16/22     Admitting Provider: Luanne Vickers MD     Discharge Provider: Moe Hartman MD     Discharge Diagnoses: Active Hospital Problems    Diagnosis     Diffuse pain [R52]      Priority: Medium    Dyspnea [R06.00]      Priority: Medium    Chronic anemia [D64.9]     Hyperglycemia due to type 2 diabetes mellitus ([address] [E11.65]     Hypertension, uncontrolled [I10]        The patient was seen and examined on day of discharge and this discharge summary is in conjunction with any daily progress note from day of discharge. Hospital Course:  Mary Cardona is a 62-year-old female with significant past medical history of hypertension, hyperlipidemia, chronic kidney disease, and CAD who presents to [facility] emergency department with a constellation of vague symptoms such as generalized body pains and chronic upper back pain. She is unable to define an onset of these pains, she states back pain has been for many years, and the body aches have been coming and going for \"long time. \"        Ruled out lesions on thoracic spine and R pelvic bones. Initial imaging reports were concerning given her h/o breast cancer (bilateral resection and radiation in 2015, had not been compliant with femara). Also had R renal cancer (resection and radiation when she was 4years old). Also, the patient unintentionally lost 90 lbs in the last few years, but this might have been due to her previous diabetic medications and weight has been stable since 2/2022. Oncology ordered a bone biopsy, but first IR requested a pelvic CT, which lead to an MRCP and MRI of her T+L spine and pelvis. Ultimately there was not any convincing evidence of malignancy and no biopsy was pursued. Ruled out stones in the pancreatic duct.   MRCP showed \"an abrupt tapering of the distal common duct\" with proximal biliary dilation, but no apparent stone or malignancy. EGD unremarkable. Ca 19-9 still in process. I see that her CEA was ordered and was elevated, defer further management of this to GI. They are planning outpatient EUS. Muscular deconditioning. PT/OT rec'd SNF and patient eventually agreed. DM2. Insulin regimen. A1c recently 14.9, patient admits to poor compliance at home, encouraged her to focus on this more. HTN. Lisinopril.     CAD. Aspirin, ticagrelor, statin. She is on paliperidone. I am uncertain as to her exact psych diagnosis, she says it is for bipolar disorder. Also trazodone and clonazepam.       Aortic dissection ruled out. CT showed artifact per CT surgery. Physical Exam Performed:     BP (!) 100/53   Pulse 56   Temp 98.9 °F (37.2 °C) (Oral)   Resp 16   Ht 5' 3\" (1.6 m)   Wt 113 lb (51.3 kg)   SpO2 98%   BMI 20.02 kg/m²       General appearance:  No apparent distress, appears stated age and cooperative. HEENT:  Normal cephalic, atraumatic without obvious deformity. Pupils equal, round, and reactive to light. Extra ocular muscles intact. Conjunctivae/corneas clear. Neck: Supple, with full range of motion. No jugular venous distention. Trachea midline. Respiratory:  Normal respiratory effort. Clear to auscultation, bilaterally without Rales/Wheezes/Rhonchi. Cardiovascular:  Regular rate and rhythm with normal S1/S2 without murmurs, rubs or gallops. Abdomen: Soft, non-tender, non-distended with normal bowel sounds. Musculoskeletal:  No clubbing, cyanosis or edema bilaterally. Full range of motion without deformity. Skin: Skin color, texture, turgor normal.  No rashes or lesions. Neurologic:  Neurovascularly intact without any focal sensory/motor deficits.  Cranial nerves: II-XII intact, grossly non-focal.  Psychiatric:  Alert and oriented, thought content appropriate, has insight  Capillary Refill: Brisk,< 3 seconds   Peripheral Pulses: +2 palpable, equal bilaterally       Labs: For convenience and continuity at follow-up the following most recent labs are provided:      CBC:    Lab Results   Component Value Date/Time    WBC 5.9 12/15/2022 05:33 AM    HGB 9.5 12/15/2022 05:33 AM    HCT 29.0 12/15/2022 05:33 AM     12/15/2022 05:33 AM       Renal:    Lab Results   Component Value Date/Time     12/11/2022 03:08 PM    K 4.5 12/11/2022 03:08 PM    K 4.3 12/11/2022 04:45 AM     12/11/2022 03:08 PM    CO2 27 12/11/2022 03:08 PM    BUN 14 12/13/2022 10:05 AM    CREATININE 1.1 12/13/2022 10:05 AM    CALCIUM 8.6 12/11/2022 03:08 PM    PHOS 3.8 03/31/2022 01:25 PM         Significant Diagnostic Studies    Radiology:   MRI ABDOMEN WO CONTRAST MRCP   Final Result   Dilatation and irregularity of the pancreatic duct, compatible with sequelae   from chronic calcific pancreatitis. The suspected stones in the pancreatic   duct on CT are not clearly identified by MRI. Mild intra and extrahepatic biliary ductal dilatation is seen. There is   abrupt tapering of the distal common duct. No definite stone seen in the   distal common duct. Given the presence of both biliary ductal dilatation and   pancreatic ductal dilatation with no definite mass, consider endoscopic   ultrasound to rule out occult malignancy      RECOMMENDATIONS:   Unavailable         MRI PELVIS WO CONTRAST   Final Result   No evidence of malignancy or metastatic disease in the bony pelvis. There is a small focus of edema favoring subchondral cysts related to   osteoarthritis at the superior acetabulum, not corresponding to the nuclear   medicine bone scan finding. No corresponding marrow replacing lesion or   aggressive process. Previous nuclear medicine bone scan and older CTs are reviewed.   The uptake   in the right pelvis appears segmental and could be from chronic stress   reaction related to severe atrophy of the right lower quadrant soft tissues   and paraspinal musculature. Consider follow-up nuclear medicine bone scan in   3-6 months to confirm stability. MRI LUMBAR SPINE W WO CONTRAST   Final Result   1. Linear enhancement along the anterior epidural fat at L5-S1, likely due to   a prominent venous plexus. 2. No aggressive marrow signal abnormality or enhancement. 3. Multilevel degenerative change with moderate to severe spinal canal   stenosis at L4-5 and effacement of the thecal sac at L5-S1. There is mild   spinal canal narrowing at L3-4.   4. Multilevel neural foraminal narrowing. MRI THORACIC SPINE W WO CONTRAST   Final Result   1. No aggressive osseous or cord lesion to suggest metastasis. 2. No significant degenerative change. CT ABDOMEN PELVIS W IV CONTRAST Additional Contrast? None   Final Result   Questionable asymmetric sclerosis of the anterior portion of the right iliac   wing compared to the left. This could correspond to the findings on bone   scan. However the finding is equivocal.  Consider noncontrast pelvic MRI for   further characterization given the equivocal findings on CT scan compared to   the bone scan      Mild pancreatic ductal dilatation and intrahepatic biliary ductal dilatation,   presumably secondary to chronic calcific pancreatitis with stones in the   pancreatic duct and possibly in the distal common duct. Surgically absent right kidney. Wall thickening of the sigmoid colon in the pelvis, likely due to the   partially contracted state of the colon in the absence of clinical signs of   colitis. NM BONE SCAN WHOLE BODY   Preliminary Result   Findings suspicious for osseous metastatic disease in the anterior right   iliac bone and right superior pubic ramus. Radiographic correlate   recommended. CT CHEST PULMONARY EMBOLISM W CONTRAST   Final Result   1. No pulmonary embolism identified.       2.  Apparent linear filling defect within the mid descending thoracic aorta   is thought to be artifactual, rather than due to intimal tear/focal   dissection. If there is clinical concern for acute aortic dissection, CTA   chest dissection protocol would be recommended for further evaluation. 3.  Abnormal extensive mixed sclerosis/lucency throughout the spine. Differential considerations include myeloma versus metastases. Clinical   correlation recommended. 4.  Additional nonemergent findings, as above. XR CHEST PORTABLE   Final Result   No acute process. IR FLUORO GUIDED NEEDLE PLACEMENT    (Results Pending)          Consults:     IP CONSULT TO CARDIOTHORACIC SURGERY  IP CONSULT TO HOSPITALIST  IP CONSULT TO HEART FAILURE NURSE/COORDINATOR  IP CONSULT TO DIETITIAN  IP CONSULT TO ONCOLOGY  IP CONSULT TO SPIRITUAL SERVICES  IP CONSULT TO GI    Disposition:  SNF     Condition at Discharge: Stable    Discharge Instructions/Follow-up:  Follow up with PCP within 1-2 weeks. Follow up with GI for consideration of another type of scope to make sure you don't have any cancer in your pancreas. Code Status:  Full Code     Activity: activity as tolerated    Diet: diabetic diet      Discharge Medications:     Current Discharge Medication List             Details   insulin glargine (LANTUS) 100 UNIT/ML injection vial Inject 30 Units into the skin every morning  Qty: 10 mL, Refills: 3                Details   oxyCODONE-acetaminophen (PERCOCET) 7.5-325 MG per tablet Take 1 tablet by mouth every 6 hours as needed for Pain for up to 10 days. Qty: 25 tablet, Refills: 0    Comments: Reduce doses taken as pain becomes manageable  Associated Diagnoses: Abnormal CT of the chest      clonazePAM (KLONOPIN) 0.5 MG tablet Take 1 tablet by mouth 3 times daily as needed for Anxiety for up to 10 days.   Qty: 20 tablet, Refills: 3    Associated Diagnoses: Abnormal CT of the chest      gabapentin (NEURONTIN) 600 MG tablet Take 1 tablet by mouth 3 times daily for 10 days. Qty: 30 tablet, Refills: 0    Associated Diagnoses: Abnormal CT of the chest                Details   insulin lispro, 1 Unit Dial, (HUMALOG/ADMELOG) 100 UNIT/ML SOPN With meals                   - at bedtime    < 150 ---   5 units            0 units  151-200 -- 6 units            0 units  201-250 :  7 units            1 units  251-300:   8 units            2 units  301-350:   9 units            3 units  >350 :       10 units          4 units  Upto 30 units/day  Qty: 5 Adjustable Dose Pre-filled Pen Syringe, Refills: 2    Associated Diagnoses: Diabetic neuropathy with neurologic complication ([address] Type 2 diabetes mellitus with diabetic nephropathy, with long-term current use of insulin (HCC)      lisinopril (PRINIVIL;ZESTRIL) 40 MG tablet TAKE 1 TABLET BY MOUTH EVERY DAY  Qty: 30 tablet, Refills: 5    Associated Diagnoses: Uncontrolled type 2 diabetes mellitus with microalbuminuria, with long-term current use of insulin      atorvastatin (LIPITOR) 20 MG tablet TAKE 1 TABLET BY MOUTH EVERY DAY  Qty: 30 tablet, Refills: 5    Associated Diagnoses: Uncontrolled type 2 diabetes mellitus with microalbuminuria, with long-term current use of insulin      blood glucose test strips (TRUE METRIX BLOOD GLUCOSE TEST) strip As needed.   Qty: 200 strip, Refills: 5    Associated Diagnoses: Uncontrolled type 2 diabetes mellitus with microalbuminuria, with long-term current use of insulin      Insulin Pen Needle 32G X 4 MM MISC 1 each by Does not apply route daily  Qty: 100 each, Refills: 3    Associated Diagnoses: Uncontrolled type 2 diabetes mellitus with microalbuminuria, with long-term current use of insulin      Blood Glucose Monitoring Suppl (TRUE METRIX METER) w/Device KIT Used to  check blood sugar 3 times eyad  Qty: 1 kit, Refills: 1    Associated Diagnoses: Uncontrolled type 2 diabetes mellitus with microalbuminuria, with long-term current use of insulin      ticagrelor (BRILINTA) 90 MG TABS tablet TAKE 1 TABLET BY MOUTH TWICE A DAY  Qty: 60 tablet, Refills: 11      nitroGLYCERIN (NITROSTAT) 0.4 MG SL tablet up to max of 3 total doses. If no relief after 1 dose, call 911. Qty: 25 tablet, Refills: 0      paliperidone (INVEGA) 9 MG extended release tablet Take 9 mg by mouth every morning      pantoprazole (PROTONIX) 40 MG tablet Take 40 mg by mouth daily       ferrous sulfate (IRON 325) 325 (65 Fe) MG tablet Take 325 mg by mouth daily (with breakfast)      calcium carbonate-vitamin D (CALTRATE) 600-400 MG-UNIT TABS per tab Take 1 tablet by mouth daily       aspirin 81 MG tablet Take 81 mg by mouth daily      traZODone (DESYREL) 100 MG tablet Take 200 mg by mouth nightly                Time Spent on discharge: 33 mins in the examination, evaluation, counseling and review of medications and discharge plan. Signed:    Keisha Juarez MD   12/16/2022      Thank you Philipp Nunez for the opportunity to be involved in this patient's care. If you have any questions or concerns, please feel free to contact me at [phone].

## 2022-12-16 NOTE — CARE COORDINATION
Hospital day 3: Patient on C3 care managed by IM, Hem/Onc, and GI. Patient from home alone. Patient approved for SNF stay at [facility] thru 12/20. Plans for MRI/CT. SW following. IBETH Bates

## 2022-12-16 NOTE — PROGRESS NOTES
The patient is off the floor:  -initial CT of chest questioned spine mets. -bone scan negative in spine, but questioned pelvic lesions  -ct abomen/pelvis question sclerosis right iliac wing equivocal  -MRI of abdomen/pelvis pending  -CEA 20.5 and was 12.6 in 11/9/2020  -IgG, M and A were normal  -kappa/lambda 2021 were both slightly elevated and currently pending  -SPEP no monoclonal band    Waiting for MRI of abdomen/pelvis, but not convinced she has mets to the bone.   -Elevated CEA is very concerning and waiting for GI to complete work up.      Arnold Hall MD  May be reached through [facility]

## 2022-12-16 NOTE — PROGRESS NOTES
PROGRESS NOTE    HPI: Aristides Hansen is a(n)62 y.o. female admitted for work-up and treatment for Dyspnea [R06.00]  Abnormal CT of the chest [R93.89]  Bilateral calf pain [M79.661, M79.662]  Diffuse pain [R52]. We are following for weight loss, double ductal sign. Subjective:     No new complaints today or acute events overnight. Objective:     I/O last 3 completed shifts: In: 1477.5 [P.O.:1200; I.V.:277.5]  Out: -       BP (!) 100/53   Pulse 56   Temp 98.9 °F (37.2 °C) (Oral)   Resp 16   Ht 5' 3\" (1.6 m)   Wt 113 lb (51.3 kg)   SpO2 98%   BMI 20.02 kg/m²     Physical Exam:  HEENT: anicteric sclera, oropharyngeal membranes pink and moist.  Cor: RRR  Lungs: non-labored, no respiratory distress  Abdomen: soft, NT. No ascites. No hepatomegaly or splenomegaly  Extremities: no edema  Neuro: alert and oriented x 3, no asterixis  :    Results:   Lab Results   Component Value Date    ALT 44 (H) 12/14/2022    AST 29 12/14/2022     (H) 06/03/2021    ALKPHOS 167 (H) 12/14/2022    BILIDIR <0.2 12/14/2022    PROT 5.7 (L) 12/14/2022    LABALBU 2.9 (L) 12/14/2022    INR 0.97 12/14/2022    LIPASE 5.0 (L) 12/14/2022     Lab Results   Component Value Date    WBC 5.9 12/15/2022    HGB 9.5 (L) 12/15/2022    HCT 29.0 (L) 12/15/2022    MCV 91.1 12/15/2022     12/15/2022     BUN/Cr/glu/ALT/AST/amyl/lip:  --/--/--/--/--/--/5.0 (12/14 1056)  MRI THORACIC SPINE W WO CONTRAST    Result Date: 12/15/2022  1. No aggressive osseous or cord lesion to suggest metastasis. 2. No significant degenerative change. MRI LUMBAR SPINE W WO CONTRAST    Result Date: 12/15/2022  1. Linear enhancement along the anterior epidural fat at L5-S1, likely due to a prominent venous plexus. 2. No aggressive marrow signal abnormality or enhancement. 3. Multilevel degenerative change with moderate to severe spinal canal stenosis at L4-5 and effacement of the thecal sac at L5-S1. There is mild spinal canal narrowing at L3-4. 4. Multilevel neural foraminal narrowing. CT ABDOMEN PELVIS W IV CONTRAST Additional Contrast? None    Result Date: 12/13/2022  Questionable asymmetric sclerosis of the anterior portion of the right iliac wing compared to the left. This could correspond to the findings on bone scan. However the finding is equivocal.  Consider noncontrast pelvic MRI for further characterization given the equivocal findings on CT scan compared to the bone scan Mild pancreatic ductal dilatation and intrahepatic biliary ductal dilatation, presumably secondary to chronic calcific pancreatitis with stones in the pancreatic duct and possibly in the distal common duct. Surgically absent right kidney. Wall thickening of the sigmoid colon in the pelvis, likely due to the partially contracted state of the colon in the absence of clinical signs of colitis. XR CHEST PORTABLE    Result Date: 12/10/2022  No acute process. CT CHEST PULMONARY EMBOLISM W CONTRAST    Result Date: 12/10/2022  1. No pulmonary embolism identified. 2.  Apparent linear filling defect within the mid descending thoracic aorta is thought to be artifactual, rather than due to intimal tear/focal dissection. If there is clinical concern for acute aortic dissection, CTA chest dissection protocol would be recommended for further evaluation. 3.  Abnormal extensive mixed sclerosis/lucency throughout the spine. Differential considerations include myeloma versus metastases. Clinical correlation recommended. 4.  Additional nonemergent findings, as above. MRI ABDOMEN WO CONTRAST MRCP    Result Date: 12/16/2022  Dilatation and irregularity of the pancreatic duct, compatible with sequelae from chronic calcific pancreatitis. The suspected stones in the pancreatic duct on CT are not clearly identified by MRI. Mild intra and extrahepatic biliary ductal dilatation is seen. There is abrupt tapering of the distal common duct. No definite stone seen in the distal common duct. Given the presence of both biliary ductal dilatation and pancreatic ductal dilatation with no definite mass, consider endoscopic ultrasound to rule out occult malignancy RECOMMENDATIONS: Unavailable     NM BONE SCAN WHOLE BODY    Result Date: 12/12/2022  Findings suspicious for osseous metastatic disease in the anterior right iliac bone and right superior pubic ramus. Radiographic correlate recommended. MRI PELVIS WO CONTRAST    Result Date: 12/16/2022  No evidence of malignancy or metastatic disease in the bony pelvis. There is a small focus of edema favoring subchondral cysts related to osteoarthritis at the superior acetabulum, not corresponding to the nuclear medicine bone scan finding. No corresponding marrow replacing lesion or aggressive process. Previous nuclear medicine bone scan older CTs are reviewed. The uptake in the right pelvis appears segmental and could be from chronic stress reaction related to severe atrophy of the right lower quadrant soft tissues and paraspinal musculature. Consider follow-up nuclear medicine bone scan in 3-6 months to confirm stability. Impression:  1. Chronic CBD and pancreatic ductal dilation. Ct imaging now questioning distal CBD stones and pancreatic duct stones. Bilirubin normal, alk phos chronically elevated. 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 - Ca 19-9 pending.  - CEA 20.5 (12.6 2020). - MRCP 12/15 with no pancreatic stones, but double ductal signs with abrupt tapering distal CBD. Evidence of chronic pancreatitis again noted. 2. Weight loss. Objective data shows 90 lbs weight loss in 2021. Diabetic medications adjusted for this reason, and weight has been stable since 2/2022. 3. Change in bowel habits, diarrhea over the past year. No longer on metformin. Possible pancreatic insufficiency. 4. Chronic normocytic anemia. On oral iron pta.    Iron studies have looked okay in the past.  Unremarkable EGD 2021. Repeat 12/15 also unremarkable. Duodenal path pending. Poor colon prep 2021.     5. Abnormal imaging spine and pelvis. No evidence of mets on MRI imaging. Bone biopsy pending. 6. Hx breat cancer s/p lumpectomy and radiation. Has not been taking Femara since 6/2020.     7. Hx kidney cancer. 8. CAD on Brilinta. Plan:  Follow-up Ca 19-9. Outpatient EUS. 2. Outpatient colonoscopy. 3. Check pancreatic elastase if able to get stool sample. Office will coordinate procedures. Okay to discharge from GI standpoint. Please do not hesitate to call with questions or concerns.       Electronically signed by: SARTHAK Polo 12/16/2022 9:10 AM

## 2022-12-16 NOTE — CONSULTS
[address] 1960    History:  Past Medical History:   Diagnosis Date    Abnormal brain MRI 7/20/2017    Partially empty sella and minimal chronic small vessel ischemic disease    Acute bilateral low back pain without sciatica 11/2/2016    SHARRON (acute kidney injury) ([facility] [address] 7/5/2017    Arthritis     back    Bipolar disorder ([facility] [address] 10/18/2008    CAD (coronary artery disease)     stent placed 6/8/20    Cancer ([facility] [address] 2015    bilateral breast:s/p lumpectomy/radiation:under care care of breast specialist:Dr. Boone     Carotid stenosis, bilateral:<50%:per US 7/2016 7/15/2016    Carpal tunnel syndrome 10/18/2008    Cervical cancer screening 2014    Nml per pt'. Coronary artery disease of native artery of native heart with stable angina pectoris ([facility] [address] 6/9/2020    DDD (degenerative disc disease), lumbar 7/18/2018    Depression     under care of pschiatrist:Dr. Edgar Single    Depression/anxiety 7/5/2017    Depression/anxiety     Diabetes mellitus (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 [address] 9/17/2017    Microalbuminuria 7/1/2016    Neuropathy in diabetes [facility])     Non morbid obesity 7/1/2016    Pancreatitis 5/12/16    [facility] hospitalization 5/12/16-5/16/16:under care of GI:chronic pancreatitis    S/P endoscopy 6/14/2016    [facility]:per pt' & her family member was nml.     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 myelopathy or radiculopathy 3/10/2017    Transient cerebral ischemia 07/15/2016    TIA:7/10/16    Unspecified cerebral artery occlusion with cerebral infarction     TIA       ECHO:  8/22/22  EF 64-68% ([facility])  HgA1C: 9/27/22   14.9  Iron Saturation: 12/11/22  33  Ferritin:     ACE/ARB/ARNI: lisinopril 40 mg daily  BB: Spironolactone:  SGLT2:    Last Hospital Admission:  5/1/22  hyperglycemia  Discharge plans: [facility]     Advanced Directives: patient has advance directives scanned in the chart      Chart review completed. Patient a 60year old female, admitted for dyspnea. Hospital day 6, currently on C3 level of care. GI, Hem/Onc and hospitalist all following patient. GI planning for outpatient follow up. Oncology continuing to follow for testing. Plans are for patient to discharge to [facility] when medically stable. There they will monitor for diet and fluid restrictions as well as daily weights. Patient recent weights and intake/output reviewed:    No data found.       Intake/Output Summary (Last 24 hours) at 12/16/2022 1157  Last data filed at 12/16/2022 0656  Gross per 24 hour   Intake 1077.52 ml   Output --   Net 1077.52 ml         Education Time: chart review completed    Cely Mari RN     12/16/2022 11:57 AM

## 2022-12-16 NOTE — PROGRESS NOTES
BP 83/58. HR 58, Denies any dizziness or lightheadedness. Voiding without difficulty. New order noted for LR bolus of 250ml via WISAM Cadet NP.     7007: Bolus complete. /56, HR 52.

## 2022-12-16 NOTE — PROGRESS NOTES
Shift assessment updated. Patient a/ox4. VSS. RM air. Patient updated on plan of care. Will monitor. Call light in reach.

## 2022-12-16 NOTE — PROGRESS NOTES
Comprehensive Nutrition Assessment    Type and Reason for Visit:  Reassess    Nutrition Recommendations/Plan:   Continue CC4 diet and encourage PO intake  Monitor nutrition adequacy, pertinent labs, bowel habits, wt changes, and clinical progress     Malnutrition Assessment:  Malnutrition Status: At risk for malnutrition (Comment) (12/12/22 1130)      Nutrition Assessment:    Follow up: No evidence concerning for malignancy, no biopsy ordered. EGD unremarkable. CEA, GI plan for outpatient EUS. Plan for D/c today. Pt on CC4 diet w/ PO intakes of % of most meals. Pt reports good appetite and intake. Educated pt on carb control diet, pt seems compliant. Continue to encourage PO intake, will continue to monitor. Nutrition Related Findings:    -469 x 24 hours. On insulin. No updated labs to review. + BM today Wound Type: None       Current Nutrition Intake & Therapies:    Average Meal Intake: %  Average Supplements Intake: Unable to assess  ADULT DIET; Regular; 4 carb choices (60 gm/meal)    Anthropometric Measures:  Height: 5' 3\" (160 cm)  Ideal Body Weight (IBW): 115 lbs (52 kg)       Current Body Weight: 113 lb (51.3 kg),   IBW. Weight Source: Standing Scale  Current BMI (kg/m2): 20                          BMI Categories: Normal Weight (BMI 18.5-24. 9)    Estimated Daily Nutrient Needs:  Energy Requirements Based On: Kcal/kg  Weight Used for Energy Requirements: Current  Energy (kcal/day): 8469-1280 (35-40 kcal/51.3 kg)  Weight Used for Protein Requirements: Current  Protein (g/day): 77-92 (1.5-1.8 g/51.3 kg)  Method Used for Fluid Requirements: 1 ml/kcal    Nutrition Diagnosis:   Increased nutrient needs related to increase demand for energy/nutrients as evidenced by weight loss    Nutrition Interventions:   Food and/or Nutrient Delivery: Continue Current Diet  Nutrition Education/Counseling: Education completed  Coordination of Nutrition Care: Continue to monitor while inpatient  Plan of Care discussed with: patient    Goals:  Previous Goal Met: Progressing toward Goal(s)  Goals: PO intake 75% or greater       Nutrition Monitoring and Evaluation:   Behavioral-Environmental Outcomes: None Identified  Food/Nutrient Intake Outcomes: Food and Nutrient Intake, Supplement Intake  Physical Signs/Symptoms Outcomes: Nutrition Focused Physical Findings    Discharge Planning:    Continue current diet     Isidoro Lorenzo [address], [address] [place] [address],   Contact: Office: [phone]; [address] [place] [address] Road: [phone]

## 2022-12-19 LAB — SPE/IFE INTERPRETATION: NORMAL

## 2022-12-26 NOTE — PROGRESS NOTES
Physician Progress Note      PATIENT:               Arely Simons  CSN #:                  [id number]  :                       1960  ADMIT DATE:       12/10/2022 12:50 PM  [address] [place] DATE:        2022 4:57 PM  RESPONDING  PROVIDER #:        Sally Dodge MD          QUERY TEXT:    Pt admitted with back pain and reported reduced physical activity/mobility and   generalized weakness. If possible, please document in the progress notes and   discharge summary if you are evaluating and / or treating any of the   following: The medical record reflects the following:  Risk Factors: Diabetes, anemia, bipolar disorder, CHF, CKD, Anemia  Clinical Indicators: Per discharge summary \"Muscular deconditioning. PT/OT   rec'd SNF and patient eventually agreed\". Malignancy and stones ruled out. Treatment: PT/OT, SNF, serial labs, supportive care    Thank you,  Ian Coon RN BSN  Options provided:  -- Age Related Physical Debility  -- Other - I will add my own diagnosis  -- Disagree - Not applicable / Not valid  -- Disagree - Clinically unable to determine / Unknown  -- Refer to Clinical Documentation Reviewer    PROVIDER RESPONSE TEXT:    This patient has age related physical debility.     Query created by: Chad Burnett on 2022 8:50 AM      Electronically signed by:  Sally Dodge MD 2022 2:18 PM

## 2023-01-11 ENCOUNTER — HOSPITAL ENCOUNTER (EMERGENCY)
Age: 63
Discharge: HOME | End: 2023-01-11
Payer: COMMERCIAL

## 2023-01-11 VITALS
DIASTOLIC BLOOD PRESSURE: 59 MMHG | BODY MASS INDEX: 22.5 KG/M2 | WEIGHT: 127 LBS | HEIGHT: 63 IN | SYSTOLIC BLOOD PRESSURE: 186 MMHG | RESPIRATION RATE: 16 BRPM | HEART RATE: 58 BPM | TEMPERATURE: 98.5 F | OXYGEN SATURATION: 99 %

## 2023-01-11 DIAGNOSIS — S05.02XA ABRASION OF LEFT CORNEA, INITIAL ENCOUNTER: Primary | ICD-10-CM

## 2023-01-11 PROCEDURE — 99283 EMERGENCY DEPT VISIT LOW MDM: CPT

## 2023-01-11 PROCEDURE — 6370000000 HC RX 637 (ALT 250 FOR IP): Performed by: PHYSICIAN ASSISTANT

## 2023-01-11 RX ORDER — ERYTHROMYCIN 5 MG/G
OINTMENT OPHTHALMIC
Qty: 3.5 G | Refills: 0 | Status: SHIPPED | OUTPATIENT
Start: 2023-01-11

## 2023-01-11 RX ORDER — ACETAMINOPHEN 500 MG
500 TABLET ORAL 4 TIMES DAILY PRN
Qty: 120 TABLET | Refills: 0 | Status: SHIPPED | OUTPATIENT
Start: 2023-01-11

## 2023-01-11 RX ORDER — TETRACAINE HYDROCHLORIDE 5 MG/ML
1 SOLUTION OPHTHALMIC ONCE
Status: COMPLETED | OUTPATIENT
Start: 2023-01-11 | End: 2023-01-11

## 2023-01-11 RX ADMIN — FLUORESCEIN SODIUM 1 MG: 1 STRIP OPHTHALMIC at 09:59

## 2023-01-11 RX ADMIN — TETRACAINE HYDROCHLORIDE 1 DROP: 5 SOLUTION OPHTHALMIC at 09:59

## 2023-01-11 ASSESSMENT — VISUAL ACUITY
OD: 20/70
OU: 20/100

## 2023-01-11 NOTE — ED PROVIDER NOTES
[facility]  ED  EMERGENCY DEPARTMENT ENCOUNTER        Pt Name: Facundo Xiong  MRN: [id number]  [place] 1960  Date of evaluation: 1/11/2023  Provider: RANGEL Stockton  PCP: Anthony Deleon  Note Started: 10:16 AM EST 1/11/23      BETTY. I have evaluated this patient. My supervising physician was available for consultation. CHIEF COMPLAINT       Chief Complaint   Patient presents with    Eye Pain     Pt to er with eye pain in left eye. Started yesterday. Had lasix 4-5 months ago       HISTORY OF PRESENT ILLNESS: 1 or more Elements     History From: patient  Limitations to history : None    Facundo Xiong is a 60 y.o. female who presents to the emergency department for evaluation of left eye pain. Patient was watching TV this morning when she developed left eye pain. She states that she had LASEK done bilaterally about 4 to 5 months ago and since then she has had blurry vision and she denies any changes from this baseline blurriness. She rates her pain as severe, worse with opening her eye. Denies severe headache, nausea, vomiting. Denies foreign body sensation or drainage. Nursing Notes were all reviewed and agreed with or any disagreements were addressed in the HPI. REVIEW OF SYSTEMS :      Review of Systems   All other systems reviewed and are negative. Positives and Pertinent negatives as per HPI.      SURGICAL HISTORY     Past Surgical History:   Procedure Laterality Date    BREAST LUMPECTOMY  2015    Bilateral:breast cancer    CARDIAC CATHETERIZATION  06/08/2020    Dr. Colten Lynn), DAYANA to Diag 1    COLONOSCOPY N/A 2/1/2021    COLONOSCOPY DIAGNOSTIC performed by Good Rodríguez MD at [address]  2/3/2021    CT BONE MARROW BIOPSY 2/3/2021 Lion Rodriguez MD [facility] CT SCAN    HYSTERECTOMY (CERVIX STATUS UNKNOWN)      Benign:no cervical cancer per pt'    KIDNEY REMOVAL      right    OTHER SURGICAL HISTORY Right     orif right ankle TEMPORAL ARTERY BIOPSY Right 8/9/2021    RIGHT TEMPORAL ARTERY BIOPSY LIGATION performed by Lin Dickerson MD at [address]  N/A 1/29/2021    EGD BIOPSY performed by Mike Santos MD at [address] 12/15/2022    EGD BIOPSY performed by Mike Santos MD at [address] [facility] [address]       Discharge Medication List as of 1/11/2023 10:54 AM        CONTINUE these medications which have NOT CHANGED    Details   clonazePAM (KLONOPIN) 0.5 MG tablet Take 1 tablet by mouth 3 times daily as needed for Anxiety for up to 10 days. , Disp-20 tablet, R-3Print      gabapentin (NEURONTIN) 600 MG tablet Take 1 tablet by mouth 3 times daily for 10 days. , Disp-30 tablet, R-0Print      insulin glargine (LANTUS) 100 UNIT/ML injection vial Inject 30 Units into the skin every morning, Disp-10 mL, R-3Normal      insulin lispro, 1 Unit Dial, (HUMALOG/ADMELOG) 100 UNIT/ML SOPN With meals                   - at bedtime    < 150 ---   5 units            0 units  151-200 -- 6 units            0 units  201-250 :  7 units            1 units  251-300:   8 units            2 units  301-350:   9 units            3 units  >350 :        10 units          4 units  Upto 30 units/day, Disp-5 Adjustable Dose Pre-filled Pen Syringe, R-2Normal      lisinopril (PRINIVIL;ZESTRIL) 40 MG tablet TAKE 1 TABLET BY MOUTH EVERY DAY, Disp-30 tablet, R-5Normal      atorvastatin (LIPITOR) 20 MG tablet TAKE 1 TABLET BY MOUTH EVERY DAY, Disp-30 tablet, R-5Normal      blood glucose test strips (TRUE METRIX BLOOD GLUCOSE TEST) strip As needed. , Disp-200 strip, R-5Normal      Insulin Pen Needle 32G X 4 MM MISC DAILY Starting Fri 7/22/2022, Disp-100 each, R-3, Normal      Blood Glucose Monitoring Suppl (TRUE METRIX METER) w/Device KIT Used to  check blood sugar 3 times eyad, Disp-1 kit, R-1Normal      ticagrelor (BRILINTA) 90 MG TABS tablet TAKE 1 TABLET BY MOUTH TWICE A DAY, Disp-60 tablet, R-11Normal      nitroGLYCERIN (NITROSTAT) 0.4 MG SL tablet up to max of 3 total doses.  If no relief after 1 dose, call 911., Disp-25 tablet, R-0Normal      paliperidone (INVEGA) 9 MG extended release tablet Take 9 mg by mouth every morningHistorical Med      pantoprazole (PROTONIX) 40 MG tablet Take 40 mg by mouth daily Historical Med      ferrous sulfate (IRON 325) 325 (65 Fe) MG tablet Take 325 mg by mouth daily (with breakfast)Historical Med      calcium carbonate-vitamin D (CALTRATE) 600-400 MG-UNIT TABS per tab Take 1 tablet by mouth daily Historical Med      aspirin 81 MG tablet Take 81 mg by mouth dailyHistorical Med      traZODone (DESYREL) 100 MG tablet Take 200 mg by mouth nightly Historical Med             ALLERGIES     Morphine, Penicillins, Codeine, and Penicillin g    FAMILYHISTORY       Family History   Problem Relation Age of Onset    Cancer Mother         breast    Cancer Father     Heart Failure Neg Hx     High Cholesterol Neg Hx     Hypertension Neg Hx     Migraines Neg Hx     Rashes/Skin Problems Neg Hx     Seizures Neg Hx     Stroke Neg Hx     Thyroid Disease Neg Hx     Diabetes Neg Hx         SOCIAL HISTORY       Social History     Tobacco Use    Smoking status: Former     Packs/day: 0.50     Years: 20.00     Pack years: 10.00     Types: Cigarettes, Cigars     Quit date: 7/3/2014     Years since quittin.5    Smokeless tobacco: Never   Vaping Use    Vaping Use: Never used   Substance Use Topics    Alcohol use: No     Alcohol/week: 0.0 standard drinks    Drug use: No       SCREENINGS        Miller Coma Scale  Eye Opening: Spontaneous  Best Verbal Response: Oriented  Best Motor Response: Obeys commands  Alberto Coma Scale Score: 15                CIWA Assessment  BP: (!) 186/59  Heart Rate: 58           PHYSICAL EXAM  1 or more Elements     ED Triage Vitals [23 0937]   BP Temp Temp Source Heart Rate Resp SpO2 Height Weight   (!) 186/59 98.5 °F (36.9 °C) Oral 58 16 99 % 5' 3\" (1.6 m) 127 lb (57.6 kg)       Physical Exam  Vitals and nursing note reviewed. Constitutional:       General: She is not in acute distress. Appearance: She is well-developed. She is not diaphoretic. HENT:      Head: Normocephalic and atraumatic. Eyes:      General: Lids are normal.         Right eye: No foreign body or discharge. Left eye: No discharge. Extraocular Movements: Extraocular movements intact. Conjunctiva/sclera: Conjunctivae normal.      Pupils: Pupils are equal, round, and reactive to light. Comments: Flouroscein uptake in 7:00 position. No corneal ulcer   Pulmonary:      Effort: Pulmonary effort is normal. No respiratory distress. Breath sounds: No stridor. Musculoskeletal:         General: Normal range of motion. Cervical back: Normal range of motion and neck supple. Skin:     General: Skin is warm and dry. Coloration: Skin is not pale. Neurological:      Mental Status: She is alert and oriented to person, place, and time. Comments: No gross facial drooping. Moves all 4 extremities spontaneously. Psychiatric:         Mood and Affect: Mood normal.         Behavior: Behavior normal.           DIAGNOSTIC RESULTS   LABS:    Labs Reviewed - No data to display    When ordered only abnormal lab results are displayed. All other labs were within normal range or not returned as of this dictation. EKG: When ordered, EKG's are interpreted by the Emergency Department Physician in the absence of a cardiologist.  Please see their note for interpretation of EKG. RADIOLOGY:   Non-plain film images such as CT, Ultrasound and MRI are read by the radiologist. Plain radiographic images are visualized and preliminarily interpreted by the ED Provider with the below findings:        Interpretation per the Radiologist below, if available at the time of this note:    No orders to display     No results found.     No results found.    PROCEDURES   Unless otherwise noted below, none     Procedures    CRITICAL CARE TIME (.cctime)       PAST MEDICAL HISTORY      has a past medical history of Abnormal brain MRI (7/20/2017), Acute bilateral low back pain without sciatica (11/2/2016), SHARRON (acute kidney injury) ([facility] (7/5/2017), Arthritis, Bipolar disorder ([facility] (10/18/2008), CAD (coronary artery disease), Cancer ([facility] (2015), Carotid stenosis, bilateral:<50%:per US 7/2016 (7/15/2016), Carpal tunnel syndrome (10/18/2008), Cervical cancer screening (2014), Coronary artery disease of native artery of native heart with stable angina pectoris 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 Non morbid obesity (7/1/2016), Pancreatitis (5/12/16), S/P endoscopy (6/14/2016), Scoliosis, Spondylosis of lumbar region without myelopathy or radiculopathy (3/10/2017), Transient cerebral ischemia (07/15/2016), and Unspecified cerebral artery occlusion with cerebral infarction.      EMERGENCY DEPARTMENT COURSE and DIFFERENTIAL DIAGNOSIS/MDM:   Vitals:    Vitals:    01/11/23 0937   BP: (!) 186/59   Pulse: 58   Resp: 16   Temp: 98.5 °F (36.9 °C)   TempSrc: Oral   SpO2: 99%   Weight: 127 lb (57.6 kg)   Height: 5' 3\" (1.6 m)       Patient was given the following medications:  Medications   fluorescein ophthalmic strip 1 mg (1 mg Ophthalmic Given 1/11/23 0959)   tetracaine (TETRAVISC) 0.5 % ophthalmic solution 1 drop (1 drop Ophthalmic Given 1/11/23 0959)             Is this patient to be included in the SEP-1 Core Measure due to severe sepsis or septic shock? No   Exclusion criteria - the patient is NOT to be included for SEP-1 Core Measure due to: Infection is not suspected    Chronic Conditions affecting care:    has a past medical history of Abnormal brain MRI (7/20/2017), Acute bilateral low back pain without sciatica (11/2/2016), SHARRON (acute kidney injury) ([facility] (7/5/2017), Arthritis, Bipolar disorder ([address] (10/18/2008), CAD (coronary artery disease), Cancer ([address] (2015), Carotid stenosis, bilateral:<50%:per US 7/2016 (7/15/2016), Carpal tunnel syndrome (10/18/2008), Cervical cancer screening (2014), Coronary artery disease of native artery of native heart with stable angina pectoris ([facility] (6/9/2020), DDD (degenerative disc disease), lumbar (7/18/2018), Depression, Depression/anxiety (7/5/2017), Depression/anxiety, Diabetes mellitus ([address] Gout, History of mammogram (10/28/2016;8/14/17), History of therapeutic radiation, Hyperlipidemia, Hypertension, 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 CONSULTS: (Who and What was discussed)  None      Social Determinants : None    Records Reviewed (Source):     CC/HPI Summary, DDx, ED Course, and Reassessment: Differential diagnosis: Iritis, Uveitis, Conjunctivitis, Herpes Keratitis, Corneal Ulcer, Corneal Abrasion, Acute Angle Glaucoma, Orbital Cellulitis/Abscess, Periorbital Cellulitis, other. Patient seen and examined today for acute left eye pain. See HPI for patient presentation. Patient is nontoxic, afebrile with unremarkable vital signs.      Right eye 20/70, bilateral 20/100, left eye unable to identify anything correctly on eye chart. Patient states that she has had blurry vision that is unchanged for the past several months ever since her Lasik surgery despite continuing to use artificial tears. Had relief of her pain with tetracaine. Fluorescein stain revealed uptake in the 7 o'clock position indicating a corneal abrasion. There was no corneal ulcer, penetrating injury. Considered acute glaucoma however there is no lateral red eye with pain, nausea and vomiting to suggest this, I have low suspicion. At this time I believe patient's presentation does not warrant further workup with labs or imaging in the emergency department and is stable for discharge home. the pupil reacts, there is no objective foreign body sensation or photophobia, and there is no corneal opacity, hyphema or hypopyon. There is no unilateral reg eye with pain, nausea and vomiting to suggest angle closure glaucoma. Patient will be discharged with medications as listed below and referral to follow up with the opthalmologist for reevaluation within the next 24 hours, and to return to the emergency department for any worsening symptoms or further concerns. They verbalized understanding and were discharged in stable condition. I estimate there is LOW risk for CORNEAL ULCER, PENETRATING GLOBE INJURY, CENTRAL RETINAL ARTERY OCCLUSION, ACUTE GLAUCOMA, RETINAL VEIN THROMBOSIS, OR HERPETIC KERATITIS, thus I consider the discharge disposition reasonable. I am the Primary Clinician of Record. FINAL IMPRESSION      1. Abrasion of left cornea, initial encounter          DISPOSITION/PLAN     DISPOSITION Decision To Discharge 01/11/2023 11:38:28 AM      PATIENT REFERRED TO:    call your eye dr and follow up in 24-48 hours.   [facility] off [address] is [phone]        [facility]  ED  [address] [place] [address] [address]  [address]  [phone]    If symptoms worsen    DISCHARGE MEDICATIONS:  Discharge Medication List as of 1/11/2023 10:54 AM        START taking these medications    Details   erythromycin (ROMYCIN) 5 MG/GM ophthalmic ointment Apply 1/2 inch strip 4-6 times daily for 7 days, Disp-3.5 g, R-0, Normal      acetaminophen (TYLENOL) 500 MG tablet Take 1 tablet by mouth 4 times daily as needed for Pain, Disp-120 tablet, R-0Normal             DISCONTINUED MEDICATIONS:  Discharge Medication List as of 1/11/2023 10:54 AM                 (Please note that portions of this note were completed with a voice recognition program.  Efforts were made to edit the dictations but occasionally words are mis-transcribed.)    Jorge Parikh (electronically signed)       RANGEL Parikh  01/11/23 [address] [place] [address],[place], [place]  01/12/23 6877

## 2023-02-10 ENCOUNTER — APPOINTMENT (OUTPATIENT)
Dept: GENERAL RADIOLOGY | Age: 63
End: 2023-02-10
Payer: COMMERCIAL

## 2023-02-10 ENCOUNTER — HOSPITAL ENCOUNTER (EMERGENCY)
Age: 63
Discharge: HOME | End: 2023-02-11
Attending: EMERGENCY MEDICINE
Payer: COMMERCIAL

## 2023-02-10 ENCOUNTER — APPOINTMENT (OUTPATIENT)
Dept: CT IMAGING | Age: 63
End: 2023-02-10
Payer: COMMERCIAL

## 2023-02-10 DIAGNOSIS — I10 PRIMARY HYPERTENSION: Primary | ICD-10-CM

## 2023-02-10 DIAGNOSIS — R07.9 CHEST PAIN, UNSPECIFIED TYPE: ICD-10-CM

## 2023-02-10 LAB
ALBUMIN SERPL-MCNC: 3.6 G/DL (ref 3.4–5)
ALP BLD-CCNC: 155 U/L (ref 40–129)
ALT SERPL-CCNC: 25 U/L (ref 10–40)
ANION GAP SERPL CALCULATED.3IONS-SCNC: 6 MMOL/L (ref 3–16)
AST SERPL-CCNC: 21 U/L (ref 15–37)
BASE EXCESS VENOUS: -6.3 MMOL/L (ref -3–3)
BASOPHILS ABSOLUTE: 0.1 K/UL (ref 0–0.2)
BASOPHILS RELATIVE PERCENT: 1.2 %
BILIRUB SERPL-MCNC: <0.2 MG/DL (ref 0–1)
BILIRUBIN DIRECT: <0.2 MG/DL (ref 0–0.3)
BILIRUBIN, INDIRECT: ABNORMAL MG/DL (ref 0–1)
BUN BLDV-MCNC: 22 MG/DL (ref 7–20)
CALCIUM SERPL-MCNC: 8.9 MG/DL (ref 8.3–10.6)
CARBOXYHEMOGLOBIN: 1.7 % (ref 0–1.5)
CHLORIDE BLD-SCNC: 99 MMOL/L (ref 99–110)
CO2: 26 MMOL/L (ref 21–32)
CREAT SERPL-MCNC: 1.1 MG/DL (ref 0.6–1.2)
EOSINOPHILS ABSOLUTE: 0.1 K/UL (ref 0–0.6)
EOSINOPHILS RELATIVE PERCENT: 1.3 %
GFR SERPL CREATININE-BSD FRML MDRD: 56 ML/MIN/{1.73_M2}
GLUCOSE BLD-MCNC: 368 MG/DL (ref 70–99)
HCO3 VENOUS: 17.6 MMOL/L (ref 23–29)
HCT VFR BLD CALC: 30.2 % (ref 36–48)
HEMOGLOBIN: 9.2 G/DL (ref 12–16)
LIPASE: 5 U/L (ref 13–60)
LYMPHOCYTES ABSOLUTE: 1.8 K/UL (ref 1–5.1)
LYMPHOCYTES RELATIVE PERCENT: 35.4 %
MCH RBC QN AUTO: 27 PG (ref 26–34)
MCHC RBC AUTO-ENTMCNC: 30.6 G/DL (ref 31–36)
MCV RBC AUTO: 88.3 FL (ref 80–100)
METHEMOGLOBIN VENOUS: 0.9 %
MONOCYTES ABSOLUTE: 0.3 K/UL (ref 0–1.3)
MONOCYTES RELATIVE PERCENT: 6 %
NEUTROPHILS ABSOLUTE: 2.9 K/UL (ref 1.7–7.7)
NEUTROPHILS RELATIVE PERCENT: 56.1 %
O2 SAT, VEN: 99 %
O2 THERAPY: ABNORMAL
PCO2, VEN: 28.9 MMHG (ref 40–50)
PDW BLD-RTO: 14.1 % (ref 12.4–15.4)
PH VENOUS: 7.4 (ref 7.35–7.45)
PLATELET # BLD: 171 K/UL (ref 135–450)
PMV BLD AUTO: 8.7 FL (ref 5–10.5)
PO2, VEN: 191.7 MMHG (ref 25–40)
POTASSIUM REFLEX MAGNESIUM: 4.8 MMOL/L (ref 3.5–5.1)
RBC # BLD: 3.42 M/UL (ref 4–5.2)
SODIUM BLD-SCNC: 131 MMOL/L (ref 136–145)
TCO2 CALC VENOUS: 19 MMOL/L
TOTAL PROTEIN: 7 G/DL (ref 6.4–8.2)
TROPONIN: <0.01 NG/ML
WBC # BLD: 5.1 K/UL (ref 4–11)

## 2023-02-10 PROCEDURE — 71260 CT THORAX DX C+: CPT | Performed by: EMERGENCY MEDICINE

## 2023-02-10 PROCEDURE — 6360000002 HC RX W HCPCS: Performed by: EMERGENCY MEDICINE

## 2023-02-10 PROCEDURE — 83690 ASSAY OF LIPASE: CPT

## 2023-02-10 PROCEDURE — 96374 THER/PROPH/DIAG INJ IV PUSH: CPT

## 2023-02-10 PROCEDURE — 96372 THER/PROPH/DIAG INJ SC/IM: CPT

## 2023-02-10 PROCEDURE — 99285 EMERGENCY DEPT VISIT HI MDM: CPT

## 2023-02-10 PROCEDURE — 93005 ELECTROCARDIOGRAM TRACING: CPT | Performed by: EMERGENCY MEDICINE

## 2023-02-10 PROCEDURE — 85025 COMPLETE CBC W/AUTO DIFF WBC: CPT

## 2023-02-10 PROCEDURE — 84484 ASSAY OF TROPONIN QUANT: CPT

## 2023-02-10 PROCEDURE — 6360000004 HC RX CONTRAST MEDICATION: Performed by: EMERGENCY MEDICINE

## 2023-02-10 PROCEDURE — 80076 HEPATIC FUNCTION PANEL: CPT

## 2023-02-10 PROCEDURE — 6370000000 HC RX 637 (ALT 250 FOR IP): Performed by: EMERGENCY MEDICINE

## 2023-02-10 PROCEDURE — 82803 BLOOD GASES ANY COMBINATION: CPT

## 2023-02-10 PROCEDURE — 80048 BASIC METABOLIC PNL TOTAL CA: CPT

## 2023-02-10 PROCEDURE — 71045 X-RAY EXAM CHEST 1 VIEW: CPT

## 2023-02-10 RX ORDER — LISINOPRIL 20 MG/1
40 TABLET ORAL ONCE
Status: COMPLETED | OUTPATIENT
Start: 2023-02-10 | End: 2023-02-10

## 2023-02-10 RX ORDER — FENTANYL CITRATE 50 UG/ML
50 INJECTION, SOLUTION INTRAMUSCULAR; INTRAVENOUS ONCE
Status: COMPLETED | OUTPATIENT
Start: 2023-02-10 | End: 2023-02-10

## 2023-02-10 RX ADMIN — FENTANYL CITRATE 50 MCG: 50 INJECTION, SOLUTION INTRAMUSCULAR; INTRAVENOUS at 21:37

## 2023-02-10 RX ADMIN — INSULIN HUMAN 10 UNITS: 100 INJECTION, SOLUTION PARENTERAL at 22:58

## 2023-02-10 RX ADMIN — NITROGLYCERIN 0.5 INCH: 20 OINTMENT TOPICAL at 21:34

## 2023-02-10 RX ADMIN — IOPAMIDOL 75 ML: 755 INJECTION, SOLUTION INTRAVENOUS at 22:44

## 2023-02-10 RX ADMIN — LISINOPRIL 40 MG: 20 TABLET ORAL at 23:40

## 2023-02-10 ASSESSMENT — PAIN SCALES - GENERAL
PAINLEVEL_OUTOF10: 6
PAINLEVEL_OUTOF10: 9

## 2023-02-10 ASSESSMENT — PAIN DESCRIPTION - DESCRIPTORS
DESCRIPTORS: CRAMPING
DESCRIPTORS: CRAMPING

## 2023-02-10 ASSESSMENT — PAIN DESCRIPTION - ORIENTATION
ORIENTATION: OTHER (COMMENT)
ORIENTATION: MID

## 2023-02-10 ASSESSMENT — PAIN DESCRIPTION - PAIN TYPE: TYPE: ACUTE PAIN

## 2023-02-10 ASSESSMENT — PAIN DESCRIPTION - LOCATION
LOCATION: CHEST

## 2023-02-10 ASSESSMENT — PAIN DESCRIPTION - FREQUENCY: FREQUENCY: INTERMITTENT

## 2023-02-11 VITALS
SYSTOLIC BLOOD PRESSURE: 164 MMHG | WEIGHT: 119 LBS | OXYGEN SATURATION: 98 % | TEMPERATURE: 98.4 F | DIASTOLIC BLOOD PRESSURE: 68 MMHG | HEIGHT: 63 IN | HEART RATE: 57 BPM | RESPIRATION RATE: 14 BRPM | BODY MASS INDEX: 21.09 KG/M2

## 2023-02-11 LAB
EKG ATRIAL RATE: 55 BPM
EKG DIAGNOSIS: NORMAL
EKG P AXIS: 31 DEGREES
EKG P-R INTERVAL: 162 MS
EKG Q-T INTERVAL: 464 MS
EKG QRS DURATION: 86 MS
EKG QTC CALCULATION (BAZETT): 443 MS
EKG R AXIS: 35 DEGREES
EKG T AXIS: 70 DEGREES
EKG VENTRICULAR RATE: 55 BPM
GLUCOSE BLD-MCNC: 268 MG/DL (ref 70–99)
PERFORMED ON: ABNORMAL
TROPONIN: <0.01 NG/ML

## 2023-02-11 PROCEDURE — 84484 ASSAY OF TROPONIN QUANT: CPT

## 2023-02-11 PROCEDURE — 93010 ELECTROCARDIOGRAM REPORT: CPT | Performed by: INTERNAL MEDICINE

## 2023-02-11 NOTE — DISCHARGE INSTRUCTIONS
Make sure you are taking your Lisinopril 40mg daily for your blood pressure. Also, as discussed, try to cut back on salt intake. Eating too much salt can make your blood pressure high.

## 2023-02-11 NOTE — ED NOTES
This RN discussed discharge instructions with pt including taking her prescribed bp medication and follow up. Pt verbalized understanding. IV removed with no complications. Pt left stable and ambulatory by self with all belongings.       Ivania Ness RN  02/11/23 0109

## 2023-02-11 NOTE — ED PROVIDER NOTES
Emergency Department Provider Note  Location: [address] [place] [address]  ED  2/10/2023     Patient Identification  Jaxon Gaviria is a 60 y.o. female    Chief Complaint  Chest Pain (C/o chest pain on and off all day. Took full ASA at home and 1 nitro without relief. States headache now from nitro. Per EMS NSR on monitor )    Mode of arrival  EMS    HPI  (History provided by patient)  This is a 60 y.o. female with a PMH significant for CAD, h/o breast cancer and wilms tumor  presented today for chest pain. Patient was at a GI clinic this morning when she was told her blood pressure was elevated, 200/75. Patient said that got her upset and she developed chest pain that she described as cramping pain in the center of the chest.  It radiates more to the right. She rates the pain 9/10 intensity. Taking a deep breath makes the pain somewhat worse. She took 1 nitro and 324 mg of aspirin for the chest pain. She has no relief of the chest pain. However she subsequently developed headache from the nitroglycerin. Patient states her chest pain remained about the same throughout the day. She does not think exertion makes the pain worse. Minimal SOB. No nausea or vomiting. She also denies fever or acute URI symptoms. Per EMS, EKG on scene showed normal sinus rhythm. No other complaints, modifying factors or associated symptoms. I have reviewed the following nursing documentation:  Allergies:    Allergies   Allergen Reactions    Morphine Anaphylaxis and Hives     feels like throat is closing    Penicillins Hives and Swelling    Codeine Hives and Rash    Penicillin G Rash       Past medical history:  has a past medical history of Abnormal brain MRI (7/20/2017), Acute bilateral low back pain without sciatica (11/2/2016), SHARRON (acute kidney injury) ([facility] [address] (7/5/2017), Arthritis, Bipolar disorder ([facility] [address] [facility] (10/18/2008), CAD (coronary artery disease), Cancer ([facility] [address] [facility] (2015), Carotid stenosis, bilateral:<50%:per US 7/2016 (7/15/2016), Carpal tunnel syndrome (10/18/2008), Cervical cancer screening (2014), Coronary artery disease of native artery of native heart with stable angina pectoris ([facility] [address] (6/9/2020), DDD (degenerative disc disease), lumbar (7/18/2018), Depression, Depression/anxiety (7/5/2017), Depression/anxiety, Diabetes mellitus (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 [address] (9/17/2017), Microalbuminuria (7/1/2016), Neurop[facility] diabetes [facility] Non morbid obesity (7/1/2016), Pancreatitis (5/12/16), S/P endoscopy (6/14/2016), Scoliosis, Spondylosis of lumbar region without myelopathy or radiculopathy (3/10/2017), Transient cerebral ischemia (07/15/2016), and Unspecified cerebral artery occlusion with cerebral infarction. Past surgical history:  has a past surgical history that includes Kidney removal; Hysterectomy; Breast lumpectomy (2015); Tubal ligation; other surgical history (Right); Cardiac catheterization (06/08/2020); Upper gastrointestinal endoscopy (N/A, 1/29/2021); Colonoscopy (N/A, 2/1/2021); CT BIOPSY BONE MARROW (2/3/2021); Temporal Artery Biopsy (Right, 8/9/2021); and Upper gastrointestinal endoscopy (N/A, 12/15/2022). Home medications:   Prior to Admission medications    Medication Sig Start Date End Date Taking? Authorizing Provider   clonazePAM (KLONOPIN) 0.5 MG tablet Take 1 tablet by mouth 3 times daily as needed for Anxiety for up to 10 days. 12/16/22 12/26/22  Mariano Calhoun MD   gabapentin (NEURONTIN) 600 MG tablet Take 1 tablet by mouth 3 times daily for 10 days.  12/16/22 12/26/22  Mariano Calhoun MD   insulin glargine (LANTUS) 100 UNIT/ML injection vial Inject 30 Units into the skin every morning 12/17/22   Mariano Calhoun MD   insulin lispro, 1 Unit Dial, (HUMALOG/ADMELOG) 100 UNIT/ML SOPN With meals                   - at bedtime    < 150 ---   5 units            0 units  151-200 -- 6 units            0 units  201-250 :  7 units            1 units  251-300:   8 units            2 units  301-350:   9 units            3 units  >350 :       10 units          4 units  Upto 30 units/day 10/27/22   Adarsh Jaime MD   lisinopril (PRINIVIL;ZESTRIL) 40 MG tablet TAKE 1 TABLET BY MOUTH EVERY DAY 7/22/22   Adarsh Jaime MD   atorvastatin (LIPITOR) 20 MG tablet TAKE 1 TABLET BY MOUTH EVERY DAY 7/22/22   Adarsh Jaime MD   blood glucose test strips (TRUE METRIX BLOOD GLUCOSE TEST) strip As needed. Patient taking differently: 3 each daily 7/22/22   Adarsh Jaime MD   Insulin Pen Needle 32G X 4 MM MISC 1 each by Does not apply route daily 7/22/22   Adarsh Jaime MD   Blood Glucose Monitoring Suppl (TRUE METRIX METER) w/Device KIT Used to  check blood sugar 3 times eyad 7/22/22   Adarsh Jaime MD   ticagrelor (BRILINTA) 90 MG TABS tablet TAKE 1 TABLET BY MOUTH TWICE A DAY 4/18/22   Vincent Guo MD   nitroGLYCERIN (NITROSTAT) 0.4 MG SL tablet up to max of 3 total doses. If no relief after 1 dose, call 911. 9/29/21   Jamir Thakur MD   paliperidone (INVEGA) 9 MG extended release tablet Take 9 mg by mouth every morning 3/15/21   Historical Provider, MD   pantoprazole (PROTONIX) 40 MG tablet Take 40 mg by mouth daily  4/3/21   Historical Provider, MD   ferrous sulfate (IRON 325) 325 (65 Fe) MG tablet Take 325 mg by mouth daily (with breakfast)    Historical Provider, MD   calcium carbonate-vitamin D (CALTRATE) 600-400 MG-UNIT TABS per tab Take 1 tablet by mouth daily  12/30/19   Historical Provider, MD   aspirin 81 MG tablet Take 81 mg by mouth daily    Historical Provider, MD   traZODone (DESYREL) 100 MG tablet Take 200 mg by mouth nightly     Historical Provider, MD       Social history:  reports that she quit smoking about 8 years ago.  Her smoking use included cigarettes and cigars. She has a 10.00 pack-year smoking history. She has never used smokeless tobacco. She reports that she does not drink alcohol and does not use drugs. Family history:    Family History   Problem Relation Age of Onset    Cancer Mother         breast    Cancer Father     Heart Failure Neg Hx     High Cholesterol Neg Hx     Hypertension Neg Hx     Migraines Neg Hx     Rashes/Skin Problems Neg Hx     Seizures Neg Hx     Stroke Neg Hx     Thyroid Disease Neg Hx     Diabetes Neg Hx        Exam  ED Triage Vitals   BP Temp Temp Source Heart Rate Resp SpO2 Height Weight   02/10/23 1945 02/10/23 1945 02/10/23 1945 02/10/23 1945 02/10/23 1945 -- 02/10/23 2024 02/10/23 2024   (!) 202/75 98.4 °F (36.9 °C) Oral 60 16  5' 3\" (1.6 m) 119 lb (54 kg)   Physical Exam  Vitals and nursing note reviewed. Constitutional:       General: She is not in acute distress. Appearance: Normal appearance. She is well-developed. She is not diaphoretic. HENT:      Head: Normocephalic and atraumatic. Eyes:      General: Lids are normal. No scleral icterus. Right eye: No discharge. Left eye: No discharge. Conjunctiva/sclera: Conjunctivae normal.      Pupils: Pupils are equal, round, and reactive to light. Neck:      Trachea: No tracheal deviation. Cardiovascular:      Rate and Rhythm: Normal rate and regular rhythm. Pulses: Normal pulses. Pulmonary:      Effort: Pulmonary effort is normal. No respiratory distress. Breath sounds: Normal breath sounds. No stridor. No wheezing. Chest:      Chest wall: No tenderness. Abdominal:      General: There is no distension. Palpations: Abdomen is soft. Tenderness: There is no abdominal tenderness. There is no guarding or rebound. Musculoskeletal:         General: No deformity. Cervical back: Neck supple. Right lower leg: No edema. Left lower leg: No edema. Skin:     General: Skin is warm and dry. Capillary Refill: Capillary refill takes less than 2 seconds. Neurological:      Mental Status: She is alert and oriented to person, place, and time. Cranial Nerves: No dysarthria or facial asymmetry. Motor: No weakness or tremor. Coordination: Coordination normal.   Psychiatric:         Mood and Affect: Mood normal.         Speech: Speech normal.         MDM/ED Course  EKG  The Ekg interpreted by me in the absence of a cardiologist shows. sinus bradycardia, rate=55     Axis is   Normal  QTc is  normal  Intervals and Durations are unremarkable. No specific ST-T wave changes appreciated. No evidence of acute ischemia. No significant change from prior EKG dated 12/10/22    Radiology  XR CHEST PORTABLE    Result Date: 2/10/2023  EXAMINATION: ONE XRAY VIEW OF THE CHEST 2/10/2023 9:13 pm COMPARISON: None. HISTORY: ORDERING SYSTEM PROVIDED HISTORY: chest pain TECHNOLOGIST PROVIDED HISTORY: Reason for exam:->chest pain Reason for Exam: Chest Pain FINDINGS: Heart size is normal  Aorta is tortuous. Mediastiu=num is not widened. . Lungs are normally expanded and clear. No pleural effusions. Mild spondylosis     Lungs are clear     CT CHEST PULMONARY EMBOLISM W CONTRAST    Result Date: 2/10/2023  EXAMINATION: CTA OF THE CHEST 2/10/2023 10:36 pm TECHNIQUE: CTA of the chest was performed after the administration of intravenous contrast.  Multiplanar reformatted images are provided for review. MIP images are provided for review. Automated exposure control, iterative reconstruction, and/or weight based adjustment of the mA/kV was utilized to reduce the radiation dose to as low as reasonably achievable. COMPARISON: Chest radiograph 02/10/2023. CT chest angiogram 12/10/2022.  HISTORY: ORDERING SYSTEM PROVIDED HISTORY: sharp chest pain, h/o cancern TECHNOLOGIST PROVIDED HISTORY: Reason for exam:->sharp chest pain, h/o cancern Decision Support Exception - unselect if not a suspected or confirmed emergency medical condition->Emergency Medical Condition (MA) Reason for Exam: CP and upper abd pain with fever per pt, HTN FINDINGS: Pulmonary Arteries: Pulmonary arteries are adequately opacified for evaluation. No evidence of intraluminal filling defect to suggest pulmonary embolism. Main pulmonary artery is normal in caliber. Mediastinum: The thoracic aorta is unremarkable. Mild coronary artery atherosclerotic vascular calcifications are seen. No acute abnormality in the branch vessels of the superior mediastinum and lower neck. The heart is normal in size. No pericardial effusion. The mediastinal esophagus and visualized aspects of the thyroid gland are unremarkable. No lymphadenopathy or pneumomediastinum. Lungs/pleura: The central airways are patent. Mild emphysematous changes. No consolidation or interlobular septal thickening. Upper Abdomen: Limited images of the upper abdomen are unremarkable. Soft Tissues/Bones: No acute osseous or soft tissue abnormality. 1. No pulmonary embolism or other acute process in the chest. 2. Mild emphysematous changes.          Labs  Results for orders placed or performed during the hospital encounter of 02/10/23   CBC with Auto Differential   Result Value Ref Range    WBC 5.1 4.0 - 11.0 K/uL    RBC 3.42 (L) 4.00 - 5.20 M/uL    Hemoglobin 9.2 (L) 12.0 - 16.0 g/dL    Hematocrit 30.2 (L) 36.0 - 48.0 %    MCV 88.3 80.0 - 100.0 fL    MCH 27.0 26.0 - 34.0 pg    MCHC 30.6 (L) 31.0 - 36.0 g/dL    RDW 14.1 12.4 - 15.4 %    Platelets 015 256 - 595 K/uL    MPV 8.7 5.0 - 10.5 fL    Neutrophils % 56.1 %    Lymphocytes % 35.4 %    Monocytes % 6.0 %    Eosinophils % 1.3 %    Basophils % 1.2 %    Neutrophils Absolute 2.9 1.7 - 7.7 K/uL    Lymphocytes Absolute 1.8 1.0 - 5.1 K/uL    Monocytes Absolute 0.3 0.0 - 1.3 K/uL    Eosinophils Absolute 0.1 0.0 - 0.6 K/uL    Basophils Absolute 0.1 0.0 - 0.2 K/uL   BMP w/ Reflex to MG   Result Value Ref Range    Sodium 131 (L) 136 - 145 mmol/L Potassium reflex Magnesium 4.8 3.5 - 5.1 mmol/L    Chloride 99 99 - 110 mmol/L    CO2 26 21 - 32 mmol/L    Anion Gap 6 3 - 16    Glucose 368 (H) 70 - 99 mg/dL    BUN 22 (H) 7 - 20 mg/dL    Creatinine 1.1 0.6 - 1.2 mg/dL    Est, Glom Filt Rate 56 (A) >60    Calcium 8.9 8.3 - 10.6 mg/dL   Lipase   Result Value Ref Range    Lipase 5.0 (L) 13.0 - 60.0 U/L   Troponin   Result Value Ref Range    Troponin <0.01 <0.01 ng/mL   Hepatic Function Panel   Result Value Ref Range    Total Protein 7.0 6.4 - 8.2 g/dL    Albumin 3.6 3.4 - 5.0 g/dL    Alkaline Phosphatase 155 (H) 40 - 129 U/L    ALT 25 10 - 40 U/L    AST 21 15 - 37 U/L    Total Bilirubin <0.2 0.0 - 1.0 mg/dL    Bilirubin, Direct <0.2 0.0 - 0.3 mg/dL    Bilirubin, Indirect see below 0.0 - 1.0 mg/dL   Blood gas, venous   Result Value Ref Range    pH, Kyle 7.402 7.350 - 7.450    pCO2, Kyle 28.9 (L) 40.0 - 50.0 mmHg    pO2, Kyle 191.7 (H) 25.0 - 40.0 mmHg    HCO3, Venous 17.6 (L) 23.0 - 29.0 mmol/L    Base Excess, Kyle -6.3 (L) -3.0 - 3.0 mmol/L    O2 Sat, Kyle 99 Not Established %    Carboxyhemoglobin 1.7 (H) 0.0 - 1.5 %    MetHgb, Kyle 0.9 <1.5 %    TC02 (Calc), Kyle 19 Not Established mmol/L    O2 Therapy Unknown    Troponin   Result Value Ref Range    Troponin <0.01 <0.01 ng/mL   POCT Glucose   Result Value Ref Range    POC Glucose 268 (H) 70 - 99 mg/dl    Performed on [facility]    EKG 12 Lead   Result Value Ref Range    Ventricular Rate 55 BPM    Atrial Rate 55 BPM    P-R Interval 162 ms    QRS Duration 86 ms    Q-T Interval 464 ms    QTc Calculation (Bazett) 443 ms    P Axis 31 degrees    R Axis 35 degrees    T Axis 70 degrees    Diagnosis       Sinus bradycardiaConfirmed by Brent Jaimes MD, Talia Krishna ([id number]) on 2/11/2023 2:43:37 PM       - Patient seen and evaluated in room 9.  60 y.o. female presented for chest pain that is substernal and radiates to the right. It is somewhat pleuritic in nature.   It was not reproducible on exam.  Patient noted to have significantly elevated blood pressure in triage. She reported similarly elevated blood pressure earlier today in GI clinic. She initially told me she is compliant with her medicine but when I asked her what does she take for blood pressure, patient cannot tell me. She also does not know if she has taken her blood pressure medicine or if she even has it. I looked into patient's medical record and she is supposed to be on lisinopril 40 mg daily. Her creatinine is normal today and she is not in acute renal failure and therefore it is safe to give her dose. Prior to receiving her medicine however, blood pressure already improved to systolic of 387V. Once blood pressure improved, chest pain also resolved. -Given that pain is somewhat pleuritic in nature, PE is also on the differential especially considering that patient has a history of cancer. Although on exam, there was no unilateral leg swelling and patient also specifically denies lower leg swelling.  - Patient did not appear fluid lower noted on exam.  She also specifically denies orthopnea. Low concern for CHF.  - Patient was placed on telemetry during his/her ED stay and no malignant dysrhythmia observed.     - Patient was given    ED Medication Orders (From admission, onward)      Start Ordered     Status Ordering Provider    02/10/23 2345 02/10/23 2333  lisinopril (PRINIVIL;ZESTRIL) tablet 40 mg  ONCE         Last MAR action: Given - by Yen Hernandez on 02/10/23 at [address] [employer]    02/10/23 2235 02/10/23 2236  iopamidol (ISOVUE-370) 76 % injection 75 mL  IMG ONCE PRN         Last MAR action: Given - by Leah Page on 02/10/23 at [address] [place] [facility]    02/10/23 2230 02/10/23 2229  insulin regular (HUMULIN R;NOVOLIN R) injection 10 Units  ONCE         Last MAR action: Given - by Yen Hernandez on 02/10/23 at [facility]    02/10/23 2115 02/10/23 2110  fentaNYL (SUBLIMAZE) injection 50 mcg  ONCE         Last MAR action: Given - by Yen Hernandez on 02/10/23 at [address] [facility]    02/10/23 2115 02/10/23 2111  nitroglycerin (NITRO-BID) 2 % ointment 0.5 inch  ONCE         Last MAR action: Given - by Kamryn Nix on 02/10/23 at [facility] [place] [facility]              -Glucose elevated but normal bicarb and VBG showed normal pH. No concern for DKA. - Anemia noted. Not significantly different compared to prior lab.  -No acute renal failure. No significant metabolic derangement. EKG did not show acute ST segment changes and 2 sets of troponin negative. - I discussed the results with patient. We agreed to discharge home and she will monitor her blood pressure closely. She will also go home and make sure she has her lisinopril. If not, she will call her pharmacy to get refills. I advised her to follow-up with her PCP closely for her BP management. - Return precautions also discussed. patient verbalized understanding of care plan and agreed to follow-up with PCP as advised. - Is this patient to be included in the SEP-1 Core Measure due to severe sepsis or septic shock? No   Exclusion criteria - the patient is NOT to be included for SEP-1 Core Measure due to: Infection is not suspected    I estimate there is LOW risk for ACUTE RESPIRATORY FAILURE, ACUTE CORONARY SYNDROME, SEVERE COPD/ASTHMA EXACERBATION, ACUTE EXACERBATION OF CONGESTIVE HEART FAILURE, PERICARDIAL TAMPONADE, PNEUMONIA WITH HYPOXIA, PNEUMOTHORAX, PULMONARY EMBOLISM WITH RIGHT HEART STRAIN, SEPSIS, and THORACIC DISSECTION,  thus I consider the discharge disposition reasonable. Bernice Barber and I have discussed the diagnosis and risks, and we agree with discharging home to follow-up with PCP. We also discussed returning to the Emergency Department immediately if new or worsening symptoms occur. We have discussed the symptoms which are most concerning (e.g., bloody sputum, fever, worsening pain or shortness of breath) that necessitate immediate return. Clinical Impression:  1. Primary hypertension    2. Chest pain, unspecified type          Disposition:  Discharge to home in good condition. Blood pressure (!) 164/68, pulse 57, temperature 98.4 °F (36.9 °C), temperature source Oral, resp. rate 14, height 5' 3\" (1.6 m), weight 119 lb (54 kg), SpO2 98 %, not currently breastfeeding. Disposition referral (if applicable):  [place] [id number]  [phone]    Schedule an appointment as soon as possible for a visit in 3 days      Ivette AZUL am the primary attending of record and contributed the majority of evaluation and treatment of emergent care for this encounter. Total critical care time is 0 minutes, which excludes separately billable procedures and updating family. Time spent is specifically for management of the presenting complaint and symptoms initially, direct bedside care, reevaluation, review of records, and consultation. There was a high probability of clinically significant life-threatening deterioration in the patient's condition, which required my urgent intervention. This chart was generated in part by using Dragon Dictation system and may contain errors related to that system including errors in grammar, punctuation, and spelling, as well as words and phrases that may be inappropriate. If there are any questions or concerns please feel free to contact the dictating provider for clarification.      Ivette An MD  [id number] [facility] Lawyer Sary MD  02/12/23 0627

## 2023-02-11 NOTE — Clinical Note
60 F here w/ CP. Patient states she was at GI doctor today and BP was 200/100. She went home pain began around [facility] clock. 9/10 Cramping pain in the center of the chest that sometimes hurts more on the right. Patient sates she feels SOB, has headaches (exacerbated by her nitroglycerin( she took one this am and another before she came in. Hx of stent placement veto, htn, hld, family hitory in mother and father (bad hearts). Hx of cancer in her right breast and a wilms tumor for which she has one kidney. History of smoking cigard for 8-9 years quit 8 years ago. Denies any drug or alcohol use. Was currently at GI for workup of cancer she states she has lost a  lot of weight over the last year and trouble with loose stools. Denies any stoool color change. PE nothing, Vitals hypertensive 200/75 sating in the 90s, bradycardia 60s.  Heart score 5

## 2023-02-20 ENCOUNTER — HOSPITAL ENCOUNTER (OUTPATIENT)
Dept: CT IMAGING | Age: 63
Discharge: HOME | End: 2023-02-20

## 2023-02-20 DIAGNOSIS — C50.919 MALIGNANT NEOPLASM OF FEMALE BREAST, UNSPECIFIED ESTROGEN RECEPTOR STATUS, UNSPECIFIED LATERALITY, UNSPECIFIED SITE OF BREAST (HCC): ICD-10-CM

## 2023-02-26 ENCOUNTER — HOSPITAL ENCOUNTER (INPATIENT)
Age: 63
LOS: 17 days | Discharge: NURSING, REHAB OR LONG-TERM CARE | DRG: 329 | End: 2023-03-15
Attending: STUDENT IN AN ORGANIZED HEALTH CARE EDUCATION/TRAINING PROGRAM | Admitting: INTERNAL MEDICINE
Payer: COMMERCIAL

## 2023-02-26 ENCOUNTER — APPOINTMENT (OUTPATIENT)
Dept: CT IMAGING | Age: 63
DRG: 329 | End: 2023-02-26
Payer: COMMERCIAL

## 2023-02-26 DIAGNOSIS — Z12.11 SCREENING FOR COLON CANCER: ICD-10-CM

## 2023-02-26 DIAGNOSIS — R41.0 CONFUSION: ICD-10-CM

## 2023-02-26 DIAGNOSIS — E16.2 HYPOGLYCEMIA: ICD-10-CM

## 2023-02-26 DIAGNOSIS — C18.3 MALIGNANT NEOPLASM OF HEPATIC FLEXURE (HCC): ICD-10-CM

## 2023-02-26 DIAGNOSIS — M51.36 DDD (DEGENERATIVE DISC DISEASE), LUMBAR: Primary | ICD-10-CM

## 2023-02-26 PROBLEM — T38.3X5A HYPOGLYCEMIA DUE TO INSULIN: Status: ACTIVE | Noted: 2023-02-26

## 2023-02-26 PROBLEM — E16.0 HYPOGLYCEMIA DUE TO INSULIN: Status: ACTIVE | Noted: 2023-02-26

## 2023-02-26 LAB
A/G RATIO: 0.9 (ref 1.1–2.2)
ALBUMIN SERPL-MCNC: 3.3 G/DL (ref 3.4–5)
ALP BLD-CCNC: 162 U/L (ref 40–129)
ALT SERPL-CCNC: 40 U/L (ref 10–40)
ANION GAP SERPL CALCULATED.3IONS-SCNC: 13 MMOL/L (ref 3–16)
AST SERPL-CCNC: 39 U/L (ref 15–37)
BASOPHILS ABSOLUTE: 0.1 K/UL (ref 0–0.2)
BASOPHILS RELATIVE PERCENT: 0.6 %
BILIRUB SERPL-MCNC: 0.3 MG/DL (ref 0–1)
BUN BLDV-MCNC: 13 MG/DL (ref 7–20)
CALCIUM SERPL-MCNC: 8.5 MG/DL (ref 8.3–10.6)
CHLORIDE BLD-SCNC: 103 MMOL/L (ref 99–110)
CO2: 19 MMOL/L (ref 21–32)
CREAT SERPL-MCNC: 0.9 MG/DL (ref 0.6–1.2)
EOSINOPHILS ABSOLUTE: 0.1 K/UL (ref 0–0.6)
EOSINOPHILS RELATIVE PERCENT: 0.7 %
GFR SERPL CREATININE-BSD FRML MDRD: >60 ML/MIN/{1.73_M2}
GLUCOSE BLD-MCNC: 108 MG/DL (ref 70–99)
GLUCOSE BLD-MCNC: 143 MG/DL (ref 70–99)
GLUCOSE BLD-MCNC: 41 MG/DL (ref 70–99)
HCT VFR BLD CALC: 33 % (ref 36–48)
HEMOGLOBIN: 10.1 G/DL (ref 12–16)
LYMPHOCYTES ABSOLUTE: 1.7 K/UL (ref 1–5.1)
LYMPHOCYTES RELATIVE PERCENT: 17.9 %
MCH RBC QN AUTO: 27.5 PG (ref 26–34)
MCHC RBC AUTO-ENTMCNC: 30.5 G/DL (ref 31–36)
MCV RBC AUTO: 90 FL (ref 80–100)
MONOCYTES ABSOLUTE: 0.5 K/UL (ref 0–1.3)
MONOCYTES RELATIVE PERCENT: 5.5 %
NEUTROPHILS ABSOLUTE: 7 K/UL (ref 1.7–7.7)
NEUTROPHILS RELATIVE PERCENT: 75.3 %
PDW BLD-RTO: 14.1 % (ref 12.4–15.4)
PERFORMED ON: ABNORMAL
PERFORMED ON: ABNORMAL
PLATELET # BLD: 161 K/UL (ref 135–450)
PMV BLD AUTO: 9.2 FL (ref 5–10.5)
POTASSIUM REFLEX MAGNESIUM: 3.6 MMOL/L (ref 3.5–5.1)
RBC # BLD: 3.66 M/UL (ref 4–5.2)
SODIUM BLD-SCNC: 135 MMOL/L (ref 136–145)
TOTAL PROTEIN: 7.1 G/DL (ref 6.4–8.2)
WBC # BLD: 9.3 K/UL (ref 4–11)

## 2023-02-26 PROCEDURE — 70450 CT HEAD/BRAIN W/O DYE: CPT

## 2023-02-26 PROCEDURE — 80053 COMPREHEN METABOLIC PANEL: CPT

## 2023-02-26 PROCEDURE — 93005 ELECTROCARDIOGRAM TRACING: CPT | Performed by: STUDENT IN AN ORGANIZED HEALTH CARE EDUCATION/TRAINING PROGRAM

## 2023-02-26 PROCEDURE — 2580000003 HC RX 258: Performed by: INTERNAL MEDICINE

## 2023-02-26 PROCEDURE — 2580000003 HC RX 258: Performed by: STUDENT IN AN ORGANIZED HEALTH CARE EDUCATION/TRAINING PROGRAM

## 2023-02-26 PROCEDURE — 6370000000 HC RX 637 (ALT 250 FOR IP): Performed by: STUDENT IN AN ORGANIZED HEALTH CARE EDUCATION/TRAINING PROGRAM

## 2023-02-26 PROCEDURE — 2000000000 HC ICU R&B

## 2023-02-26 PROCEDURE — 99285 EMERGENCY DEPT VISIT HI MDM: CPT

## 2023-02-26 PROCEDURE — 85025 COMPLETE CBC W/AUTO DIFF WBC: CPT

## 2023-02-26 RX ORDER — ONDANSETRON 2 MG/ML
4 INJECTION INTRAMUSCULAR; INTRAVENOUS EVERY 6 HOURS PRN
Status: DISCONTINUED | OUTPATIENT
Start: 2023-02-26 | End: 2023-03-15 | Stop reason: HOSPADM

## 2023-02-26 RX ORDER — PALIPERIDONE 3 MG/1
9 TABLET, EXTENDED RELEASE ORAL EVERY MORNING
Status: DISCONTINUED | OUTPATIENT
Start: 2023-02-27 | End: 2023-03-15 | Stop reason: HOSPADM

## 2023-02-26 RX ORDER — ACETAMINOPHEN 650 MG/1
650 SUPPOSITORY RECTAL EVERY 6 HOURS PRN
Status: DISCONTINUED | OUTPATIENT
Start: 2023-02-26 | End: 2023-03-08

## 2023-02-26 RX ORDER — ATORVASTATIN CALCIUM 10 MG/1
20 TABLET, FILM COATED ORAL DAILY
Status: DISCONTINUED | OUTPATIENT
Start: 2023-02-27 | End: 2023-03-15 | Stop reason: HOSPADM

## 2023-02-26 RX ORDER — LISINOPRIL 20 MG/1
40 TABLET ORAL DAILY
Status: DISCONTINUED | OUTPATIENT
Start: 2023-02-27 | End: 2023-03-09

## 2023-02-26 RX ORDER — GABAPENTIN 300 MG/1
600 CAPSULE ORAL 3 TIMES DAILY
Status: DISCONTINUED | OUTPATIENT
Start: 2023-02-26 | End: 2023-03-14

## 2023-02-26 RX ORDER — ENOXAPARIN SODIUM 100 MG/ML
40 INJECTION SUBCUTANEOUS DAILY
Status: DISCONTINUED | OUTPATIENT
Start: 2023-02-27 | End: 2023-03-08

## 2023-02-26 RX ORDER — TRAZODONE HYDROCHLORIDE 50 MG/1
200 TABLET ORAL NIGHTLY
Status: DISCONTINUED | OUTPATIENT
Start: 2023-02-26 | End: 2023-03-14

## 2023-02-26 RX ORDER — CLONAZEPAM 0.5 MG/1
0.5 TABLET ORAL 3 TIMES DAILY PRN
Status: DISCONTINUED | OUTPATIENT
Start: 2023-02-26 | End: 2023-03-15 | Stop reason: HOSPADM

## 2023-02-26 RX ORDER — SODIUM CHLORIDE 9 MG/ML
INJECTION, SOLUTION INTRAVENOUS PRN
Status: DISCONTINUED | OUTPATIENT
Start: 2023-02-26 | End: 2023-03-15 | Stop reason: HOSPADM

## 2023-02-26 RX ORDER — DEXTROSE MONOHYDRATE 50 MG/ML
1000 INJECTION, SOLUTION INTRAVENOUS CONTINUOUS
Status: DISCONTINUED | OUTPATIENT
Start: 2023-02-26 | End: 2023-02-26

## 2023-02-26 RX ORDER — ACETAMINOPHEN 325 MG/1
650 TABLET ORAL EVERY 6 HOURS PRN
Status: DISCONTINUED | OUTPATIENT
Start: 2023-02-26 | End: 2023-03-08

## 2023-02-26 RX ORDER — ACETAMINOPHEN 500 MG
500 TABLET ORAL 4 TIMES DAILY PRN
Status: DISCONTINUED | OUTPATIENT
Start: 2023-02-26 | End: 2023-02-26 | Stop reason: SDUPTHER

## 2023-02-26 RX ORDER — POLYETHYLENE GLYCOL 3350 17 G/17G
17 POWDER, FOR SOLUTION ORAL DAILY PRN
Status: DISCONTINUED | OUTPATIENT
Start: 2023-02-26 | End: 2023-03-15 | Stop reason: HOSPADM

## 2023-02-26 RX ORDER — SODIUM CHLORIDE 0.9 % (FLUSH) 0.9 %
5-40 SYRINGE (ML) INJECTION PRN
Status: DISCONTINUED | OUTPATIENT
Start: 2023-02-26 | End: 2023-03-15 | Stop reason: HOSPADM

## 2023-02-26 RX ORDER — PANTOPRAZOLE SODIUM 40 MG/1
40 TABLET, DELAYED RELEASE ORAL DAILY
Status: DISCONTINUED | OUTPATIENT
Start: 2023-02-27 | End: 2023-03-15 | Stop reason: HOSPADM

## 2023-02-26 RX ORDER — CALCIUM CARBONATE-CHOLECALCIFEROL TAB 250 MG-125 UNIT 250-125 MG-UNIT
1 TAB ORAL DAILY
Status: DISCONTINUED | OUTPATIENT
Start: 2023-02-27 | End: 2023-03-15 | Stop reason: HOSPADM

## 2023-02-26 RX ORDER — ONDANSETRON 4 MG/1
4 TABLET, ORALLY DISINTEGRATING ORAL EVERY 8 HOURS PRN
Status: DISCONTINUED | OUTPATIENT
Start: 2023-02-26 | End: 2023-03-15 | Stop reason: HOSPADM

## 2023-02-26 RX ORDER — SODIUM CHLORIDE 0.9 % (FLUSH) 0.9 %
5-40 SYRINGE (ML) INJECTION EVERY 12 HOURS SCHEDULED
Status: DISCONTINUED | OUTPATIENT
Start: 2023-02-26 | End: 2023-03-15 | Stop reason: HOSPADM

## 2023-02-26 RX ORDER — DEXTROSE MONOHYDRATE 100 MG/ML
INJECTION, SOLUTION INTRAVENOUS CONTINUOUS
Status: DISCONTINUED | OUTPATIENT
Start: 2023-02-26 | End: 2023-02-27

## 2023-02-26 RX ADMIN — DEXTROSE MONOHYDRATE: 100 INJECTION, SOLUTION INTRAVENOUS at 22:24

## 2023-02-26 RX ADMIN — Medication 16 G: at 22:21

## 2023-02-26 RX ADMIN — DEXTROSE MONOHYDRATE 1000 ML: 50 INJECTION, SOLUTION INTRAVENOUS at 22:08

## 2023-02-26 ASSESSMENT — ENCOUNTER SYMPTOMS
DIARRHEA: 1
NAUSEA: 0
ABDOMINAL PAIN: 0
SHORTNESS OF BREATH: 0
VOMITING: 0

## 2023-02-26 ASSESSMENT — PAIN - FUNCTIONAL ASSESSMENT
PAIN_FUNCTIONAL_ASSESSMENT: NONE - DENIES PAIN
PAIN_FUNCTIONAL_ASSESSMENT: NONE - DENIES PAIN

## 2023-02-26 ASSESSMENT — PAIN SCALES - GENERAL: PAINLEVEL_OUTOF10: 10

## 2023-02-26 ASSESSMENT — PAIN DESCRIPTION - DESCRIPTORS: DESCRIPTORS: STABBING

## 2023-02-26 ASSESSMENT — PAIN DESCRIPTION - ORIENTATION: ORIENTATION: LOWER

## 2023-02-26 ASSESSMENT — PAIN DESCRIPTION - LOCATION: LOCATION: BACK

## 2023-02-27 ENCOUNTER — ANESTHESIA EVENT (OUTPATIENT)
Dept: ENDOSCOPY | Age: 63
End: 2023-02-27
Payer: COMMERCIAL

## 2023-02-27 ENCOUNTER — ANESTHESIA (OUTPATIENT)
Dept: ENDOSCOPY | Age: 63
End: 2023-02-27
Payer: COMMERCIAL

## 2023-02-27 LAB
ANION GAP SERPL CALCULATED.3IONS-SCNC: 10 MMOL/L (ref 3–16)
BUN BLDV-MCNC: 13 MG/DL (ref 7–20)
CALCIUM SERPL-MCNC: 8.9 MG/DL (ref 8.3–10.6)
CHLORIDE BLD-SCNC: 106 MMOL/L (ref 99–110)
CO2: 23 MMOL/L (ref 21–32)
CREAT SERPL-MCNC: 0.9 MG/DL (ref 0.6–1.2)
EKG ATRIAL RATE: 62 BPM
EKG DIAGNOSIS: NORMAL
EKG P AXIS: 46 DEGREES
EKG P-R INTERVAL: 146 MS
EKG Q-T INTERVAL: 464 MS
EKG QRS DURATION: 76 MS
EKG QTC CALCULATION (BAZETT): 470 MS
EKG R AXIS: 21 DEGREES
EKG T AXIS: 53 DEGREES
EKG VENTRICULAR RATE: 62 BPM
GFR SERPL CREATININE-BSD FRML MDRD: >60 ML/MIN/{1.73_M2}
GLUCOSE BLD-MCNC: 106 MG/DL (ref 70–99)
GLUCOSE BLD-MCNC: 144 MG/DL (ref 70–99)
GLUCOSE BLD-MCNC: 153 MG/DL (ref 70–99)
GLUCOSE BLD-MCNC: 157 MG/DL (ref 70–99)
GLUCOSE BLD-MCNC: 157 MG/DL (ref 70–99)
GLUCOSE BLD-MCNC: 167 MG/DL (ref 70–99)
GLUCOSE BLD-MCNC: 184 MG/DL (ref 70–99)
GLUCOSE BLD-MCNC: 197 MG/DL (ref 70–99)
GLUCOSE BLD-MCNC: 214 MG/DL (ref 70–99)
GLUCOSE BLD-MCNC: 233 MG/DL (ref 70–99)
GLUCOSE BLD-MCNC: 236 MG/DL (ref 70–99)
GLUCOSE BLD-MCNC: 239 MG/DL (ref 70–99)
GLUCOSE BLD-MCNC: 72 MG/DL (ref 70–99)
GLUCOSE BLD-MCNC: 93 MG/DL (ref 70–99)
GLUCOSE BLD-MCNC: 96 MG/DL (ref 70–99)
PERFORMED ON: ABNORMAL
PERFORMED ON: NORMAL
POTASSIUM SERPL-SCNC: 3.9 MMOL/L (ref 3.5–5.1)
SODIUM BLD-SCNC: 139 MMOL/L (ref 136–145)

## 2023-02-27 PROCEDURE — 2580000003 HC RX 258: Performed by: INTERNAL MEDICINE

## 2023-02-27 PROCEDURE — 88305 TISSUE EXAM BY PATHOLOGIST: CPT

## 2023-02-27 PROCEDURE — 3700000001 HC ADD 15 MINUTES (ANESTHESIA): Performed by: INTERNAL MEDICINE

## 2023-02-27 PROCEDURE — 2500000003 HC RX 250 WO HCPCS

## 2023-02-27 PROCEDURE — 3700000000 HC ANESTHESIA ATTENDED CARE: Performed by: INTERNAL MEDICINE

## 2023-02-27 PROCEDURE — 6360000002 HC RX W HCPCS

## 2023-02-27 PROCEDURE — 3609010300 HC COLONOSCOPY W/BIOPSY SINGLE/MULTIPLE: Performed by: INTERNAL MEDICINE

## 2023-02-27 PROCEDURE — 2709999900 HC NON-CHARGEABLE SUPPLY: Performed by: INTERNAL MEDICINE

## 2023-02-27 PROCEDURE — 94761 N-INVAS EAR/PLS OXIMETRY MLT: CPT

## 2023-02-27 PROCEDURE — 0DBL8ZX EXCISION OF TRANSVERSE COLON, VIA NATURAL OR ARTIFICIAL OPENING ENDOSCOPIC, DIAGNOSTIC: ICD-10-PCS | Performed by: INTERNAL MEDICINE

## 2023-02-27 PROCEDURE — 6360000002 HC RX W HCPCS: Performed by: INTERNAL MEDICINE

## 2023-02-27 PROCEDURE — 2700000000 HC OXYGEN THERAPY PER DAY

## 2023-02-27 PROCEDURE — 36415 COLL VENOUS BLD VENIPUNCTURE: CPT

## 2023-02-27 PROCEDURE — 2580000003 HC RX 258

## 2023-02-27 PROCEDURE — 80048 BASIC METABOLIC PNL TOTAL CA: CPT

## 2023-02-27 PROCEDURE — 93010 ELECTROCARDIOGRAM REPORT: CPT | Performed by: INTERNAL MEDICINE

## 2023-02-27 PROCEDURE — 2000000000 HC ICU R&B

## 2023-02-27 PROCEDURE — 6370000000 HC RX 637 (ALT 250 FOR IP): Performed by: INTERNAL MEDICINE

## 2023-02-27 RX ORDER — INSULIN LISPRO 100 [IU]/ML
0-4 INJECTION, SOLUTION INTRAVENOUS; SUBCUTANEOUS NIGHTLY
Status: DISCONTINUED | OUTPATIENT
Start: 2023-02-27 | End: 2023-03-04

## 2023-02-27 RX ORDER — ASPIRIN 81 MG/1
81 TABLET ORAL DAILY
Status: DISCONTINUED | OUTPATIENT
Start: 2023-02-28 | End: 2023-03-15 | Stop reason: HOSPADM

## 2023-02-27 RX ORDER — DEXTROSE MONOHYDRATE 100 MG/ML
INJECTION, SOLUTION INTRAVENOUS CONTINUOUS PRN
Status: DISCONTINUED | OUTPATIENT
Start: 2023-02-27 | End: 2023-03-15 | Stop reason: HOSPADM

## 2023-02-27 RX ORDER — POLYETHYLENE GLYCOL 3350 17 G/17G
17 POWDER, FOR SOLUTION ORAL DAILY
Status: DISCONTINUED | OUTPATIENT
Start: 2023-02-27 | End: 2023-03-04

## 2023-02-27 RX ORDER — LIDOCAINE HYDROCHLORIDE 20 MG/ML
INJECTION, SOLUTION INFILTRATION; PERINEURAL PRN
Status: DISCONTINUED | OUTPATIENT
Start: 2023-02-27 | End: 2023-02-27 | Stop reason: SDUPTHER

## 2023-02-27 RX ORDER — INSULIN LISPRO 100 [IU]/ML
0-4 INJECTION, SOLUTION INTRAVENOUS; SUBCUTANEOUS
Status: DISCONTINUED | OUTPATIENT
Start: 2023-02-27 | End: 2023-03-04

## 2023-02-27 RX ORDER — PROPOFOL 10 MG/ML
INJECTION, EMULSION INTRAVENOUS PRN
Status: DISCONTINUED | OUTPATIENT
Start: 2023-02-27 | End: 2023-02-27 | Stop reason: SDUPTHER

## 2023-02-27 RX ORDER — OXYCODONE AND ACETAMINOPHEN 7.5; 325 MG/1; MG/1
1 TABLET ORAL EVERY 8 HOURS PRN
Status: DISCONTINUED | OUTPATIENT
Start: 2023-02-27 | End: 2023-03-08

## 2023-02-27 RX ADMIN — GABAPENTIN 600 MG: 300 CAPSULE ORAL at 08:17

## 2023-02-27 RX ADMIN — ACETAMINOPHEN 325MG 650 MG: 325 TABLET ORAL at 08:27

## 2023-02-27 RX ADMIN — SODIUM CHLORIDE, PRESERVATIVE FREE 10 ML: 5 INJECTION INTRAVENOUS at 08:18

## 2023-02-27 RX ADMIN — GABAPENTIN 600 MG: 300 CAPSULE ORAL at 00:10

## 2023-02-27 RX ADMIN — TRAZODONE HYDROCHLORIDE 200 MG: 50 TABLET ORAL at 00:11

## 2023-02-27 RX ADMIN — SODIUM CHLORIDE, PRESERVATIVE FREE 10 ML: 5 INJECTION INTRAVENOUS at 20:32

## 2023-02-27 RX ADMIN — TRAZODONE HYDROCHLORIDE 200 MG: 50 TABLET ORAL at 20:32

## 2023-02-27 RX ADMIN — POLYETHYLENE GLYCOL 3350, SODIUM SULFATE ANHYDROUS, SODIUM BICARBONATE, SODIUM CHLORIDE, POTASSIUM CHLORIDE 2000 ML: 236; 22.74; 6.74; 5.86; 2.97 POWDER, FOR SOLUTION ORAL at 00:23

## 2023-02-27 RX ADMIN — GABAPENTIN 600 MG: 300 CAPSULE ORAL at 15:52

## 2023-02-27 RX ADMIN — MUPIROCIN: 20 OINTMENT TOPICAL at 08:17

## 2023-02-27 RX ADMIN — LIDOCAINE HYDROCHLORIDE 100 MG: 20 INJECTION, SOLUTION INFILTRATION; PERINEURAL at 14:52

## 2023-02-27 RX ADMIN — MUPIROCIN: 20 OINTMENT TOPICAL at 20:32

## 2023-02-27 RX ADMIN — OXYCODONE AND ACETAMINOPHEN 1 TABLET: 7.5; 325 TABLET ORAL at 00:31

## 2023-02-27 RX ADMIN — PANTOPRAZOLE SODIUM 40 MG: 40 TABLET, DELAYED RELEASE ORAL at 08:18

## 2023-02-27 RX ADMIN — PROPOFOL 120 MG: 10 INJECTION, EMULSION INTRAVENOUS at 14:52

## 2023-02-27 RX ADMIN — DEXMEDETOMIDINE HYDROCHLORIDE 8 MCG: 100 INJECTION, SOLUTION INTRAVENOUS at 14:47

## 2023-02-27 RX ADMIN — PALIPERIDONE 9 MG: 3 TABLET, EXTENDED RELEASE ORAL at 08:50

## 2023-02-27 RX ADMIN — ENOXAPARIN SODIUM 40 MG: 100 INJECTION SUBCUTANEOUS at 08:18

## 2023-02-27 RX ADMIN — GABAPENTIN 600 MG: 300 CAPSULE ORAL at 20:32

## 2023-02-27 RX ADMIN — SODIUM CHLORIDE, PRESERVATIVE FREE 10 ML: 5 INJECTION INTRAVENOUS at 00:12

## 2023-02-27 RX ADMIN — OXYCODONE AND ACETAMINOPHEN 1 TABLET: 7.5; 325 TABLET ORAL at 11:06

## 2023-02-27 RX ADMIN — DEXTROSE MONOHYDRATE: 100 INJECTION, SOLUTION INTRAVENOUS at 01:07

## 2023-02-27 RX ADMIN — POLYETHYLENE GLYCOL 3350 17 G: 17 POWDER, FOR SOLUTION ORAL at 16:00

## 2023-02-27 RX ADMIN — OXYCODONE AND ACETAMINOPHEN 1 TABLET: 7.5; 325 TABLET ORAL at 19:43

## 2023-02-27 RX ADMIN — Medication 1 TABLET: at 08:17

## 2023-02-27 RX ADMIN — ONDANSETRON 4 MG: 2 INJECTION INTRAMUSCULAR; INTRAVENOUS at 10:35

## 2023-02-27 RX ADMIN — LISINOPRIL 40 MG: 20 TABLET ORAL at 08:18

## 2023-02-27 RX ADMIN — ACETAMINOPHEN 325MG 650 MG: 325 TABLET ORAL at 00:10

## 2023-02-27 RX ADMIN — ATORVASTATIN CALCIUM 20 MG: 10 TABLET, FILM COATED ORAL at 08:17

## 2023-02-27 ASSESSMENT — PAIN DESCRIPTION - LOCATION
LOCATION: ABDOMEN
LOCATION: BACK
LOCATION: BACK
LOCATION: HEAD
LOCATION: BACK;ABDOMEN
LOCATION: BACK
LOCATION: BACK

## 2023-02-27 ASSESSMENT — PAIN DESCRIPTION - DESCRIPTORS
DESCRIPTORS: SHARP;STABBING
DESCRIPTORS: DISCOMFORT
DESCRIPTORS: STABBING;SQUEEZING
DESCRIPTORS: DISCOMFORT
DESCRIPTORS: ACHING
DESCRIPTORS: CRAMPING

## 2023-02-27 ASSESSMENT — PAIN SCALES - GENERAL
PAINLEVEL_OUTOF10: 10
PAINLEVEL_OUTOF10: 7
PAINLEVEL_OUTOF10: 10
PAINLEVEL_OUTOF10: 9
PAINLEVEL_OUTOF10: 7
PAINLEVEL_OUTOF10: 0
PAINLEVEL_OUTOF10: 10
PAINLEVEL_OUTOF10: 10

## 2023-02-27 ASSESSMENT — PAIN DESCRIPTION - ORIENTATION
ORIENTATION: LOWER

## 2023-02-27 ASSESSMENT — ENCOUNTER SYMPTOMS: SHORTNESS OF BREATH: 1

## 2023-02-27 NOTE — ED PROVIDER NOTES
[facility]  ED  EMERGENCY DEPARTMENT ENCOUNTER        Patient Name: Michael Mejia  MRN: [id number]  [place] 1960  Date of evaluation: 2/26/2023  Attending Provider: Dr. Shameka Sanchez  Resident Provider: Tianna Velasco MD  PCP: Moreno Sheth  Note Started: 7:37 PM EST 2/26/23    CHIEF COMPLAINT       Hypoglycemia (Pt found on toilet unresponsive while doing bowel prep. Colonoscopy tomorrow am.  BG 40 per squad. D10 given, .  at time of arrival )      HISTORY OF PRESENT ILLNESS: 1 or more Elements     History from : Patient and EMS    Limitations to history : None    Michael Mejia is a 60 y.o. female who was found unresponsive on toilet while doing bowel prep for screening colonoscopy tomorrow with [facility]. History of type 2 diabetes, on lantus 4 units at night and lispro 8 units TID with meals. Patient states she has not eaten anything today due to bowel prep but still took 8 units of short acting insulin in AM and at 4pm. BG 40 at scene per EMS, given D10 with  afterwards. On arrival, . She does not recall events around the time she was found. Currently, she states she feels confused, shaky, and has chills. Denies changes in urination, recent illness, abdominal pain, nausea, emesis, fevers. Nursing Notes were all reviewed and agreed with or any disagreements were addressed in the HPI. REVIEW OF SYSTEMS :      Review of Systems   Constitutional:  Positive for chills. Negative for fever. Respiratory:  Negative for shortness of breath. Cardiovascular:  Negative for chest pain. Gastrointestinal:  Positive for diarrhea. Negative for abdominal pain, nausea and vomiting. Neurological:  Positive for light-headedness. Negative for weakness and numbness. Psychiatric/Behavioral:  Positive for confusion. Positives and Pertinent negatives as per HPI.      SURGICAL HISTORY     Past Surgical History:   Procedure Laterality Date    BREAST LUMPECTOMY  2015    Bilateral:breast cancer    CARDIAC CATHETERIZATION  06/08/2020    Dr. Marissa Vela), DAYANA to Diag 1    COLONOSCOPY N/A 2/1/2021    COLONOSCOPY DIAGNOSTIC performed by Marina Mauro MD at [address]  2/3/2021    CT BONE MARROW BIOPSY 2/3/2021 Morgan Sousa MD [facility] CT SCAN    HYSTERECTOMY (CERVIX STATUS UNKNOWN)      Benign:no cervical cancer per pt'    KIDNEY REMOVAL      right    OTHER SURGICAL HISTORY Right     orif right ankle    TEMPORAL ARTERY BIOPSY Right 8/9/2021    RIGHT TEMPORAL ARTERY BIOPSY LIGATION performed by Vonda Sandifer, MD at [facility] 1/29/2021    EGD BIOPSY performed by Marina [facility]uro MD at [facility] 12/15/2022    EGD BIOPSY performed by Marina Mauro MD at [address] [place] [address]       Previous Medications    ACETAMINOPHEN (TYLENOL) 500 MG TABLET    Take 1 tablet by mouth 4 times daily as needed for Pain    ASPIRIN 81 MG TABLET    Take 81 mg by mouth daily    ATORVASTATIN (LIPITOR) 20 MG TABLET    TAKE 1 TABLET BY MOUTH EVERY DAY    BLOOD GLUCOSE MONITORING SUPPL (TRUE METRIX METER) W/DEVICE KIT    Used to  check blood sugar 3 times eyad    BLOOD GLUCOSE TEST STRIPS (TRUE METRIX BLOOD GLUCOSE TEST) STRIP    As needed. CALCIUM CARBONATE-VITAMIN D (CALTRATE) 600-400 MG-UNIT TABS PER TAB    Take 1 tablet by mouth daily     CLONAZEPAM (KLONOPIN) 0.5 MG TABLET    Take 1 tablet by mouth 3 times daily as needed for Anxiety for up to 10 days. ERYTHROMYCIN (ROMYCIN) 5 MG/GM OPHTHALMIC OINTMENT    Apply 1/2 inch strip 4-6 times daily for 7 days    FERROUS SULFATE (IRON 325) 325 (65 FE) MG TABLET    Take 325 mg by mouth daily (with breakfast)    GABAPENTIN (NEURONTIN) 600 MG TABLET    Take 1 tablet by mouth 3 times daily for 10 days.     INSULIN GLARGINE (LANTUS) 100 UNIT/ML INJECTION VIAL    Inject 30 Units into the skin every morning    INSULIN LISPRO, 1 UNIT DIAL, (HUMALOG/ADMELOG) 100 UNIT/ML SOPN    With meals                   - at bedtime    < 150 ---   5 units            0 units  151-200 -- 6 units            0 units  201-250 :  7 units            1 units  251-300:   8 units            2 units  301-350:   9 units            3 units  >350 :       10 units          4 units  Upto 30 units/day    INSULIN PEN NEEDLE 32G X 4 MM MISC    1 each by Does not apply route daily    LISINOPRIL (PRINIVIL;ZESTRIL) 40 MG TABLET    TAKE 1 TABLET BY MOUTH EVERY DAY    NITROGLYCERIN (NITROSTAT) 0.4 MG SL TABLET    up to max of 3 total doses. If no relief after 1 dose, call 911.     PALIPERIDONE (INVEGA) 9 MG EXTENDED RELEASE TABLET    Take 9 mg by mouth every morning    PANTOPRAZOLE (PROTONIX) 40 MG TABLET    Take 40 mg by mouth daily     TICAGRELOR (BRILINTA) 90 MG TABS TABLET    TAKE 1 TABLET BY MOUTH TWICE A DAY    TRAZODONE (DESYREL) 100 MG TABLET    Take 200 mg by mouth nightly        ALLERGIES     Morphine, Penicillins, Codeine, and Penicillin g    FAMILYHISTORY       Family History   Problem Relation Age of Onset    Cancer Mother         breast    Cancer Father     Heart Failure Neg Hx     High Cholesterol Neg Hx     Hypertension Neg Hx     Migraines Neg Hx     Rashes/Skin Problems Neg Hx     Seizures Neg Hx     Stroke Neg Hx     Thyroid Disease Neg Hx     Diabetes Neg Hx         SOCIAL HISTORY       Social History     Tobacco Use    Smoking status: Former     Packs/day: 0.50     Years: 20.00     Pack years: 10.00     Types: Cigarettes, Cigars     Quit date: 7/3/2014     Years since quittin.6    Smokeless tobacco: Never   Vaping Use    Vaping Use: Never used   Substance Use Topics    Alcohol use: No     Alcohol/week: 0.0 standard drinks    Drug use: No       SCREENINGS        [place] Coma Scale  Eye Opening: Spontaneous  Best Verbal Response: Confused  Best Motor Response: Obeys commands  [place] Coma Scale Score: 14 [facility] Assessment  BP: (!) 164/101  Heart Rate: 68           PHYSICAL EXAM  1 or more Elements     ED Triage Vitals   BP Temp Temp Source Heart Rate Resp SpO2 Height Weight   02/26/23 1923 02/26/23 1925 02/26/23 1925 02/26/23 1923 02/26/23 1923 02/26/23 1923 02/26/23 1921 02/26/23 1921   (!) 164/101 97.1 °F (36.2 °C) Oral 68 14 100 % 5' 3\" (1.6 m) 123 lb (55.8 kg)       General: Mild distress. Somewhat confused but Oriented. Appears stated age. HEENT: No midline cervical spine tenderness. Full ROM of the neck. There is no significant cervical lympadenopathy. No difficulty tolerating oral secretions. Cardiac: Regular rate and rhythm. Radial pulses are intact bilaterally. Chest: No respiratory distress. Clear breath sounds bilaterally. No increased work of breathing. No use of accessory muscles for respiration. Abdomen: Soft, nontender, nondistended, non-peritonitic. Extremities: No significant lower extremity edema. Lower extremities are symmetric. Neuro: Moving all extremities. No focal deficits. Speech is clear. Skin: No rash, no erythema  Psych: Calm and cooperative. DIAGNOSTIC RESULTS   LABS:    Labs Reviewed   CBC WITH AUTO DIFFERENTIAL - Abnormal; Notable for the following components:       Result Value    RBC 3.66 (*)     Hemoglobin 10.1 (*)     Hematocrit 33.0 (*)     MCHC 30.5 (*)     All other components within normal limits   COMPREHENSIVE METABOLIC PANEL W/ REFLEX TO MG FOR LOW K - Abnormal; Notable for the following components:    Sodium 135 (*)     CO2 19 (*)     Glucose 108 (*)     Albumin 3.3 (*)     Albumin/Globulin Ratio 0.9 (*)     Alkaline Phosphatase 162 (*)     AST 39 (*)     All other components within normal limits   URINALYSIS WITH REFLEX TO CULTURE   POCT GLUCOSE   POCT GLUCOSE   POCT GLUCOSE       When ordered only abnormal lab results are displayed. All other labs were within normal range or not returned as of this dictation.     EKG  The EKG, as interpreted by myself, in the emergency department in the absence of a cardiologist.  normal sinus rhythm with a rate of 62  Axis is   Normal  QTc is   470  Junctional T wave abnormality      RADIOLOGY:   Non-plain film images such as CT, Ultrasound and MRI are read by the radiologist. Plain radiographic images are visualized and preliminarily interpreted by the ED Provider with the below findings:      Interpretation per the Radiologist below, if available at the time of this note:    CT HEAD WO CONTRAST   Final Result   No acute intracranial abnormality. Mild cerebral white matter chronic microvascular ischemic disease. Chronic right maxillary sinus opacification in setting of chronic sinusitis. No results found. Bedside Ultrasound, as interpreted by me, if performed:    No results found. PROCEDURES     Unless otherwise noted below, none     Procedures    CRITICAL CARE TIME     I personally spent a total of 30 minutes of critical care time in obtaining history, performing a physical exam, bedside monitoring of interventions, collecting and interpreting tests and discussion with consultants but excluding time spent performing procedures, treating other patients and teaching time.                                                                                                            PAST MEDICAL HISTORY      has a past medical history of Abnormal brain MRI (7/20/2017), Acute bilateral low back pain without sciatica (11/2/2016), SHARRON (acute kidney injury) ([address] (7/5/2017), Arthritis, Bipolar disorder ([address] (10/18/2008), CAD (coronary artery disease), Cancer ([address] (2015), Carotid stenosis, bilateral:<50%:per US 7/2016 (7/15/2016), Carpal tunnel syndrome (10/18/2008), Cervical cancer screening (2014), Coronary artery disease of native artery of native heart with stable angina pectoris ([address] (6/9/2020), DDD (degenerative disc disease), lumbar (7/18/2018), Depression, Depression/anxiety (7/5/2017), Depression/anxiety, Diabetes mellitus ([facility] Gout, History of mammogram (10/28/2016;8/14/17), History of therapeutic radiation, Hyperlipidemia, Hypertension, 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 EMERGENCY DEPARTMENT COURSE and DIFFERENTIAL DIAGNOSIS/MDM:     Vitals:    Vitals:    02/26/23 1921 02/26/23 1923 02/26/23 1925   BP:  (!) 164/101    Pulse:  68    Resp:  14    Temp:   97.1 °F (36.2 °C)   TempSrc:   Oral   SpO2:  100%    Weight: 123 lb (55.8 kg)     Height: 5' 3\" (1.6 m)         Patient was treated with and given the following medications:  Medications - No data to display          [unfilled]    [facility] Summary, DDx, ED Course, and Reassessment:     Diana Campos is a 60year old female with PMH of T2DM on insulin who presents for hypoglycemia.     The differential diagnosis associated with the patient's presentation includes: hypoglycemia secondary to poor po intake in setting of continued insulin use, orthostatic hypotension, syncopal episode, MI, PE    Hypoglycemia  - Suspect secondary to continued insulin use despite no po intake and bowel prep  - BG 40 at scene, given D10 with improvement to 355  -  on arrival  - CBC, CMP remarkable for Hgb 10.1, Alk phos 162  - BG q 1 hour performed in ED  - UA to be collected  - Decision to admit given labile BG at home and for stabilization of BG    Unwitnessed fall  - Given current confusion in ED, will do CT head  - CT head: no acute abnormalities  - Will continue to monitor confusion/symptoms    CONSULTS: (Who and What was discussed)  IP CONSULT TO GI    Discussion with Other Professionals :      Management of the patient was discussed with Dr. Phuong Laird    I discussed with [address] [place] [address] GI regarding colonoscopy scheduled for tomorrow. Will admit for observation prior to colonoscopy. Social Determinants : None    Patient's care impacted by chronic condition(s): Type 2 diabetes mellitus    Records Reviewed : Inpatient Notes   and Outpatient Notes      Patient's external medical records from prior ED visits, prior hospitalization, endocrinology notes on [id number] which revealed: history of poor compliance with medication, last A1c 14.9, hx breast cancer with possible recurrence    Disposition Considerations (include 1 Tests not done, Shared Decision Making, Pt Expectation of Test or Tx.):     I considered performing VBG but did not after shared decision making with Dr. Phuong Laird due reassuring lab work and improved BG    Decision to admit    Escalation of care including admission/observation considered due to poor medication management at home and labile blood glucose. Shared decision making with Dr. Phuong Laird was employed    I am the Primary Clinician of Record. FINAL IMPRESSION      1. Hypoglycemia    2. Confusion          DISPOSITION/PLAN     DISPOSITION Decision To Admit 02/26/2023 08:14:18 PM      PATIENT REFERRED TO:  No follow-up provider specified. DISCHARGE MEDICATIONS:  Patient was given scripts for the following medications. I counseled patient how to take these medications:  New Prescriptions    No medications on file       DISCONTINUED MEDICATIONS:  Discontinued Medications    No medications on file            (This chart was generated in part by using Dragon Dictation system and may contain errors related to that system including errors in grammar, punctuation, and spelling, as well as words and phrases that may be inappropriate.  If there are any questions or concerns please feel free to contact the dictating provider for clarification.)    Balaji Thomas Zunilda Morton MD, PGY-1  [address] [facility]   2/26/2023      Dipti Bacon MD  Resident  02/26/23 [id number]

## 2023-02-27 NOTE — PROGRESS NOTES
Patient received in ICU. Bathed with Chlorohexidine wipes, gown changed and leads placed. Patient VSS. Patient is Alert and oriented. Messaged Dr. Sudeep Bartlett regarding D10 drip. Patient will be on continuous D10 drip at a fixed dose of 100ml/hr with Q1 accuchecks. Patient is also to be placed NPO with sips of water because of scheduled EGD in the morning.     Zan Smith RN

## 2023-02-27 NOTE — CARE COORDINATION
Case Management Assessment  Initial Evaluation    Date/Time of Evaluation: 2/27/2023 10:03 AM  Assessment Completed by: Shery Najjar, RN    If patient is discharged prior to next notation, then this note serves as note for discharge by case management. Patient Name: Scott Moreno                   YOB: 1960  Diagnosis: Confusion [R41.0]  Hypoglycemia [E16.2]  Hypoglycemia due to insulin [E16.0, T38.3X5A]                   Date / Time: 2/26/2023  7:19 PM    Patient Admission Status: Inpatient   Readmission Risk (Low < 19, Mod (19-27), High > 27): Readmission Risk Score: 23.5    Current PCP: Cato Scheuermann  PCP verified by CM? Yes    Chart Reviewed: Yes      History Provided by: Patient  Patient Orientation: Alert and Oriented    Patient Cognition: Alert    Hospitalization in the last 30 days (Readmission):  No    If yes, Readmission Assessment in CM Navigator will be completed. Advance Directives:      Code Status: Full Code   Patient's Primary Decision Maker is: Legal Next of Kin    Primary Decision Maker: Roe Butler - [phone]    Secondary Decision Maker: Moi Fried - Brother/Sister - [phone]    Supplemental (Other) Decision Maker: Toya Hicks  Other - [phone]    Discharge Planning:    Patient lives with: Alone Type of Home: Apartment  Primary Care Giver: Self  Patient Support Systems include: Children   Current Financial resources: Medicaid  Current community resources: None  Current services prior to admission: None            Current DME:              Type of Home Care services:  None    ADLS  Prior functional level: Independent in ADLs/IADLs  Current functional level: Independent in ADLs/IADLs    PT AM-PAC:   /24  OT AM-PAC:   /24    Family can provide assistance at DC: Yes  Would you like Case Management to discuss the discharge plan with any other family members/significant others, and if so, who?  No  Plans to Return to Present Housing: Yes  Other Identified Issues/Barriers to RETURNING to current housing: 3rd floor apt  Potential Assistance needed at discharge: Other (Comment) (Follow for needs)            Potential DME:    Patient expects to discharge to: [facility] for transportation at discharge: Family    Financial    Payor: [address] / Plan: Vance Kayser / Product Type: *No Product type* /     Does insurance require precert for SNF: Yes    Potential assistance Purchasing Medications: No  Meds-to-Beds request: Yes      [id number] [id number] [facility],[address], [address]  [address] [facility] [address]  Phone: [phone] Fax: [phone]      Notes:    Factors facilitating achievement of predicted outcomes: Family support    Barriers to discharge: lives alone in 3rd floor apt    Additional Case Management Notes: Plan DC home- denies needs at present but CM following    The Plan for Transition of Care is related to the following treatment goals of Confusion [R41.0]  Hypoglycemia [E16.2]  Hypoglycemia due to insulin [E16.0, Z89.2T0Q]    IF APPLICABLE: The Patient and/or patient representative Kirsten Juarez and her family were provided with a choice of provider and agrees with the discharge plan. Freedom of choice list with basic dialogue that supports the patient's individualized plan of care/goals and shares the quality data associated with the providers was provided to:  (NA at present)   Patient Representative Name:       The Patient and/or Patient Representative Agree with the Discharge Plan?       Tyrese aHmlin RN  Case Management Department  Ph: [phone] Fax: [phone]

## 2023-02-27 NOTE — CONSULTS
[address] [facility] [address]  Per   RE planned colonoscopy, hypoglycemia  [address] [employer] returned page

## 2023-02-27 NOTE — H&P
Pre-sedation Assessment    History and Physical / Pre-Sedation Assessment  Patient:  Leonela Bhatt   :   1960     Intended Procedure: Colonoscopy      HPI: 59 yo with PMH of DM 2, HTN, CAD s/p PCI, bipolar disorder who has diarrhea, mild anemia and weight loss and abnormal CEA and abnormal PET scan in hepatic flexure, but poor prep in 2023. Current Facility-Administered Medications   Medication Dose Route Frequency Provider Last Rate Last Admin    oxyCODONE-acetaminophen (PERCOCET) 7.5-325 MG per tablet 1 tablet  1 tablet Oral Q8H PRN Dee Baxter MD   1 tablet at 23 1106    insulin lispro (HUMALOG) injection vial 0-4 Units  0-4 Units SubCUTAneous TID WC Dee Baxter MD        insulin lispro (HUMALOG) injection vial 0-4 Units  0-4 Units SubCUTAneous Nightly Dee Baxter MD        glucose chewable tablet 16 g  4 tablet Oral PRN Dee Baxter MD        dextrose bolus 10% 125 mL  125 mL IntraVENous PRN Dee Baxter MD        Or    dextrose bolus 10% 250 mL  250 mL IntraVENous PRN Dee Baxter MD        glucagon (rDNA) injection 1 mg  1 mg IntraMUSCular PRN Dee Baxter MD        dextrose 10 % infusion   IntraVENous Continuous PRN Dee Baxter MD        mupirocin (BACTROBAN) 2 % ointment   Nasal BID Oma Wright MD   Given at 23 0817    atorvastatin (LIPITOR) tablet 20 mg  20 mg Oral Daily Dee Baxter MD   20 mg at 23 0817    clonazePAM (KLONOPIN) tablet 0.5 mg  0.5 mg Oral TID PRN Dee Baxter MD        calcium carb-cholecalciferol 250-3. 125 MG-MCG per tab 1 tablet  1 tablet Oral Daily Dee Baxter MD   1 tablet at 23 0817    gabapentin (NEURONTIN) capsule 600 mg  600 mg Oral TID Dee Baxter MD   600 mg at 23 0817    lisinopril (PRINIVIL;ZESTRIL) tablet 40 mg  40 mg Oral Daily Dee Baxter MD   40 mg at 23 0818    paliperidone (INVEGA) extended release tablet 9 mg 9 mg Oral QAM Giles Perez MD   9 mg at 02/27/23 0850    pantoprazole (PROTONIX) tablet 40 mg  40 mg Oral Daily Giles Perez MD   40 mg at 02/27/23 0818    traZODone (DESYREL) tablet 200 mg  200 mg Oral Nightly Giles Perez MD   200 mg at 02/27/23 0011    sodium chloride flush 0.9 % injection 5-40 mL  5-40 mL IntraVENous 2 times per day Giles Perez MD   10 mL at 02/27/23 0818    sodium chloride flush 0.9 % injection 5-40 mL  5-40 mL IntraVENous PRN Giles Perez MD        0.9 % sodium chloride infusion   IntraVENous PRN Giles Perez MD        enoxaparin (LOVENOX) injection 40 mg  40 mg SubCUTAneous Daily Giles Perez MD   40 mg at 02/27/23 0818    ondansetron (ZOFRAN-ODT) disintegrating tablet 4 mg  4 mg Oral Q8H PRN Giles Perez MD        Or    ondansetron (ZOFRAN) injection 4 mg  4 mg IntraVENous Q6H PRN Giles Perez MD   4 mg at 02/27/23 1035    polyethylene glycol (GLYCOLAX) packet 17 g  17 g Oral Daily PRN Giles Perez MD        acetaminophen (TYLENOL) tablet 650 mg  650 mg Oral Q6H PRN Giles Perez MD   650 mg at 02/27/23 0827    Or    acetaminophen (TYLENOL) suppository 650 mg  650 mg Rectal Q6H PRN Giles Perez MD         Past Medical History:   Diagnosis Date    Abnormal brain MRI 7/20/2017    Partially empty sella and minimal chronic small vessel ischemic disease    Acute bilateral low back pain without sciatica 11/2/2016    SHARRON (acute kidney injury) ([facility] [address] 7/5/2017    Arthritis     back    Bipolar disorder ([facility] [address] 10/18/2008    CAD (coronary artery disease)     stent placed 6/8/20    Cancer ([facility] [address] 2015    bilateral breast:s/p lumpectomy/radiation:under care care of breast specialist:Dr. Boone     Carotid stenosis, bilateral:<50%:per US 7/2016 7/15/2016    Carpal tunnel syndrome 10/18/2008    Cervical cancer screening 2014    Nml per pt'.     Coronary artery disease of native artery of native heart with stable angina pectoris ([facility] 6/9/2020    DDD (degenerative disc disease), lumbar 7/18/2018    Depression     under care of pschiatrist:Dr. 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 myelopathy or radiculopathy 3/10/2017    Transient cerebral ischemia 07/15/2016    TIA:7/10/16    Unspecified cerebral artery occlusion with cerebral infarction     TIA     Past Surgical History:   Procedure Laterality Date    BREAST LUMPECTOMY  2015    Bilateral:breast cancer    CARDIAC CATHETERIZATION  06/08/2020    Dr. Antonietta Cooley), DAYANA to Diag 1    COLONOSCOPY N/A 2/1/2021    COLONOSCOPY DIAGNOSTIC performed by Zachary Carias MD at [address]  2/3/2021    CT BONE MARROW BIOPSY 2/3/2021 Sarah Harris MD [address] SCAN    HYSTERECTOMY (CERVIX STATUS UNKNOWN)      Benign:no cervical cancer per pt'    KIDNEY REMOVAL      right    OTHER SURGICAL HISTORY Right     orif right ankle    TEMPORAL ARTERY BIOPSY Right 8/9/2021    RIGHT TEMPORAL ARTERY BIOPSY LIGATION performed by Neal Loyola MD at [address] N/A 1/29/2021    EGD BIOPSY performed by Zachary Carias MD at [address] 12/15/2022    EGD BIOPSY performed by Zachary Carias MD at [facility] notes reviewed and agreed. Medications reviewed  Allergies: Allergies   Allergen Reactions    Morphine Anaphylaxis and Hives     feels like throat is closing    Penicillins Hives and Swelling    Codeine Hives and Rash    Penicillin G Rash           Physical Exam:  Vital Signs: /64   Pulse 68   Temp 98.4 °F (36.9 °C) (Oral)   Resp (!) 9   Ht 5' 3\" (1.6 m)   Wt 116 lb 13.5 oz (53 kg)   SpO2 100%   BMI 20.70 kg/m²  Body mass index is 20.7 kg/m². Airway:Normal  Cardiac:Normal  Pulmonary:Normal  Abdomen:Normal  Specific to procedure: none      Pre-Procedure Assessment/Plan:  ASA 3 - Patient with moderate systemic disease with functional limitations  Mallampati II  Level of Sedation Plan:Deep sedation    Post Procedure plan: Return to same level of care    I assessed the patient and find that the patient is in satisfactory condition to proceed with the planned procedure and sedation plan. I have explained the risk, benefits, and alternatives to the procedure. The patient understands and agrees to proceed.   Yes    Nurys Mena MD       (O) [phone]  Cristi Norton MD  2:18 PM 2/27/2023

## 2023-02-27 NOTE — H&P
Hospital Medicine History & Physical      PCP: Jessica Alexander    Date of Admission: 2/26/2023    Date of Service: Pt seen/examined on 2/26/2023 and Admitted to Inpatient with expected LOS greater than two midnights due to medical therapy. Chief Complaint: Hypoglycemia      History Of Present Illness:    60 y.o. female who presented to [facility] with above complaints  Patient with PMH of DM 2, HTN, CAD s/p PCI, bipolar disorder presented to the ED today for unresponsive state. Patient was apparently found unresponsive on the toilet while doing her bowel prep for a colonoscopy procedure scheduled tomorrow with [facility]. She also supposed to get an EGD. Patient reports she remembers getting diaphoretic and weak just prior to using the bathroom. And after she sat on the toilet she became very lightheaded and lost her consciousness. She reports she takes 22 units of Lantus in the morning which she did. Was instructed not to eat anything so she drank some water and Gatorade in the morning. She also proceeded to take 8 units of the short acting in the morning. Patient reports she is not aware that she is not supposed to take the short acting insulin with a meal if she was not going to eat anything for the meal.  She has been on insulin and a diabetic for about 10 years. EMS was called, and they reported her blood sugar was 40 at the scene. Given D10 and blood sugar came up. In the ED she continued to drop her blood sugars and we started on a D10 drip.      Past Medical History:          Diagnosis Date    Abnormal brain MRI 7/20/2017    Partially empty sella and minimal chronic small vessel ischemic disease    Acute bilateral low back pain without sciatica 11/2/2016    SHARRON (acute kidney injury) ([facility] [address] 7/5/2017    Arthritis     back    Bipolar disorder ([facility] [address] 10/18/2008    CAD (coronary artery disease)     stent placed 6/8/20    Cancer ([facility] [address] 2015    bilateral breast:s/p lumpectomy/radiation:under care care of breast specialist:Dr. Boone     Carotid stenosis, bilateral:<50%:per US 7/2016 7/15/2016    Carpal tunnel syndrome 10/18/2008    Cervical cancer screening 2014    Nml per pt'. Coronary artery disease of native artery of native heart with stable angina pectoris ([facility] [address] 6/9/2020    DDD (degenerative disc disease), lumbar 7/18/2018    Depression     under care of pschiatrist:Dr. 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 myelopathy or radiculopathy 3/10/2017    Transient cerebral ischemia 07/15/2016    TIA:7/10/16    Unspecified cerebral artery occlusion with cerebral infarction     TIA       Past Surgical History:          Procedure Laterality Date    BREAST LUMPECTOMY  2015    Bilateral:breast cancer    CARDIAC CATHETERIZATION  06/08/2020    Dr. Mike Degroot), DAYANA to Diag 1    COLONOSCOPY N/A 2/1/2021    COLONOSCOPY DIAGNOSTIC performed by Rudy Hennessy MD at [address]  2/3/2021    CT BONE MARROW BIOPSY 2/3/2021 Janie Arroyo MD [facility] CT SCAN    HYSTERECTOMY (CERVIX STATUS UNKNOWN)      Benign:no cervical cancer per pt'    KIDNEY REMOVAL      right    OTHER SURGICAL HISTORY Right     orif right ankle    TEMPORAL ARTERY BIOPSY Right 8/9/2021    RIGHT TEMPORAL ARTERY BIOPSY LIGATION performed by Melly Mcneal MD at [address] LIGATION      UPPER GASTROINTESTINAL ENDOSCOPY N/A 1/29/2021    EGD BIOPSY performed by Good Rodríguez MD at [address] 12/15/2022    EGD BIOPSY performed by Good Rodríguez MD at [address] [facility]       Medications Prior to Admission:      Prior to Admission medications    Medication Sig Start Date End Date Taking? Authorizing Provider   erythromycin [facility]) 5 MG/GM ophthalmic ointment Apply 1/2 inch strip 4-6 times daily for 7 days 1/11/23   RANGEL Stockton   acetaminophen (TYLENOL) 500 MG tablet Take 1 tablet by mouth 4 times daily as needed for Pain 1/11/23   RANGEL Stockton   clonazePAM (KLONOPIN) 0.5 MG tablet Take 1 tablet by mouth 3 times daily as needed for Anxiety for up to 10 days. 12/16/22 12/26/22  Elsi Arizmendi MD   gabapentin (NEURONTIN) 600 MG tablet Take 1 tablet by mouth 3 times daily for 10 days. 12/16/22 12/26/22  Elsi Arizmendi MD   insulin glargine (LANTUS) 100 UNIT/ML injection vial Inject 30 Units into the skin every morning 12/17/22   Elsi Arizmendi MD   insulin lispro, 1 Unit Dial, (HUMALOG/ADMELOG) 100 UNIT/ML SOPN With meals                   - at bedtime    < 150 ---   5 units            0 units  151-200 -- 6 units            0 units  201-250 :  7 units            1 units  251-300:   8 units            2 units  301-350:   9 units            3 units  >350 :       10 units          4 units  Upto 30 units/day 10/27/22   Nadja Salomon MD   lisinopril (PRINIVIL;ZESTRIL) 40 MG tablet TAKE 1 TABLET BY MOUTH EVERY DAY 7/22/22   Nadja Salomon MD   atorvastatin (LIPITOR) 20 MG tablet TAKE 1 TABLET BY MOUTH EVERY DAY 7/22/22   Nadja Salomon MD   blood glucose test strips (TRUE METRIX BLOOD GLUCOSE TEST) strip As needed.   Patient taking differently: 3 each daily 7/22/22   Nadja Salomon MD   Insulin Pen Needle 32G X 4 MM MISC 1 each by Does not apply route daily 7/22/22   Nadja Salomon MD Blood Glucose Monitoring Suppl (TRUE METRIX METER) w/Device KIT Used to  check blood sugar 3 times eyad 7/22/22   Carlos Cook MD   ticagrelor (BRILINTA) 90 MG TABS tablet TAKE 1 TABLET BY MOUTH TWICE A DAY 4/18/22   Briana Biggs MD   nitroGLYCERIN (NITROSTAT) 0.4 MG SL tablet up to max of 3 total doses. If no relief after 1 dose, call 911. 9/29/21   Gissel Alcantar MD   paliperidone (INVEGA) 9 MG extended release tablet Take 9 mg by mouth every morning 3/15/21   Historical Provider, MD   pantoprazole (PROTONIX) 40 MG tablet Take 40 mg by mouth daily  4/3/21   Historical Provider, MD   ferrous sulfate (IRON 325) 325 (65 Fe) MG tablet Take 325 mg by mouth daily (with breakfast)    Historical Provider, MD   calcium carbonate-vitamin D (CALTRATE) 600-400 MG-UNIT TABS per tab Take 1 tablet by mouth daily  12/30/19   Historical Provider, MD   aspirin 81 MG tablet Take 81 mg by mouth daily    Historical Provider, MD   traZODone (DESYREL) 100 MG tablet Take 200 mg by mouth nightly     Historical Provider, MD       Allergies:  Morphine, Penicillins, Codeine, and Penicillin g    Social History:      The patient currently lives at home    TOBACCO:   reports that she quit smoking about 8 years ago. Her smoking use included cigarettes and cigars. She has a 10.00 pack-year smoking history. She has never used smokeless tobacco.  ETOH:   reports no history of alcohol use. E-cigarette/Vaping       Questions Responses    E-cigarette/Vaping Use Never User    Start Date     Passive Exposure     Quit Date     Counseling Given     Comments               Family History:      Reviewed and negative in regards to presenting illness/complaint.         Problem Relation Age of Onset    Cancer Mother         breast    Cancer Father     Heart Failure Neg Hx     High Cholesterol Neg Hx     Hypertension Neg Hx     Migraines Neg Hx     Rashes/Skin Problems Neg Hx     Seizures Neg Hx     Stroke Neg Hx     Thyroid Disease Neg Hx    Diabetes Neg Hx        REVIEW OF SYSTEMS COMPLETED:   Pertinent positives as noted in the HPI. All other systems reviewed and negative.    PHYSICAL EXAM PERFORMED:    BP (!) 166/67   Pulse 62   Temp 97.3 °F (36.3 °C) (Oral)   Resp 19   Ht 5' 3\" (1.6 m)   Wt 116 lb 13.5 oz (53 kg)   SpO2 98%   BMI 20.70 kg/m²     General appearance:  No apparent distress, appears stated age and cooperative.  HEENT:  Normal cephalic, atraumatic without obvious deformity. Pupils equal, round, and reactive to light.  Extra ocular muscles intact. Conjunctivae/corneas clear.  Neck: Supple, with full range of motion. No jugular venous distention. Trachea midline.  Respiratory:  Normal respiratory effort. Clear to auscultation, bilaterally without Rales/Wheezes/Rhonchi.  Cardiovascular:  Regular rate and rhythm with normal S1/S2 without murmurs, rubs or gallops.  Abdomen: Soft, non-tender, non-distended with normal bowel sounds.  Musculoskeletal:  No clubbing, cyanosis or edema bilaterally.  Full range of motion without deformity.  Skin: Skin color, texture, turgor normal.  No rashes or lesions.  Neurologic:  Neurovascularly intact without any focal sensory/motor deficits. Cranial nerves: II-XII intact, grossly non-focal.  Psychiatric:  Alert and oriented, thought content appropriate, normal insight  Capillary Refill: Brisk,3 seconds, normal  Peripheral Pulses: +2 palpable, equal bilaterally       Labs:     Recent Labs     02/26/23 1930   WBC 9.3   HGB 10.1*   HCT 33.0*        Recent Labs     02/26/23 1930   *   K 3.6      CO2 19*   BUN 13   CREATININE 0.9   CALCIUM 8.5     Recent Labs     02/26/23 1930   AST 39*   ALT 40   BILITOT 0.3   ALKPHOS 162*     No results for input(s): INR in the last 72 hours.  No results for input(s): CKTOTAL, TROPONINI in the last 72 hours.    Urinalysis:      Lab Results   Component Value Date/Time    NITRU Negative 12/10/2022 01:42 PM    WBCUA 3-5 12/10/2022 01:42 PM     BACTERIA 2+ 12/10/2022 01:42 PM    RBCUA 5-10 12/10/2022 01:42 PM    BLOODU MODERATE 12/10/2022 01:42 PM    SPECGRAV >=1.030 12/10/2022 01:42 PM    GLUCOSEU >=1000 12/10/2022 01:42 PM    GLUCOSEU >=1000 mg/dL 06/07/2010 03:38 PM       Radiology:     CT head showed no acute intracranial abnormality  EKG:  I have reviewed the EKG with the following interpretation: NSR, rate 62    CT HEAD WO CONTRAST   Final Result   No acute intracranial abnormality. Mild cerebral white matter chronic microvascular ischemic disease. Chronic right maxillary sinus opacification in setting of chronic sinusitis. 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 POC glucose at scene 40 per EMS. Temporarily improved with D10 and p.o. glucose. Then dropped to 41 again in the ED. iatrogenic due to insulin administration without any p.o. intake of food.  -We will start patient on D10 drip at 100 cc/h  -Every hour Accu-Cheks  -We will continue D10 until at least 2 Accu-Cheks are >200, and hold D10 drip  -Then switch to frequent POC glucose and low-dose SSI.   We will continue to hold long-acting insulin  -Patient will be n.p.o. in anticipation of EGD/colonoscopy planned by [facility] in the a.m.  -Educated patient to cut her long-acting insulin by 50%, and discontinue short acting insulin when she is n.p.o.    DM2 -was previously uncontrolled A1c 14.9 in September 2022. Now with severe hypoglycemic spell  Recheck A1c. Continue to hold bolus/basal insulin regimen for now    EGD/colonoscopy-scheduled for in the a.mZoila Harding Complete GoLytely prep, ordered. N.p.o.    HTN-controlled-resume lisinopril and monitor BP    GERD-resume Protonix p.o. HLD-resume statin    Hx of breast cancer s/p lumpectomy and radiation    CAD-stable and asymptomatic, hold antiplatelets for now    DVT Prophylaxis: Lovenox  Diet: Diet NPO Exceptions are: Sips of Water with Meds  Code Status: Full Code    PT/OT Eval Status: Ambulatory    Dispo -IP stay, admit to ICU    Total critical care time caring for this patient with life threatening, unstable organ failure, including direct patient contact, management of life support systems, review of data including imaging and labs, discussions with other team members and physicians at least 35 min so far today, excluding procedures. Cari Starks MD    Thank you Anthony Deleon for the opportunity to be involved in this patient's care. If you have any questions or concerns please feel free to contact me at [phone].

## 2023-02-27 NOTE — OP NOTE
Colonoscopy Note (2 day prep)    Patient:   Conrad Landeros    YOB: 1960    Facility:   [facility] [Inpatient]  Referring/PCP: Mary Ambriz  Procedure:   Colonoscopy --diagnostic  Date:     2/27/2023  Endoscopist:  Felicia Colorado MD, MD    Preoperative Diagnosis: diarrhea, mild anemia and weight loss and abnormal CEA and abnormal PET scan in hepatic flexure, but poor prep in 1/2023 and neg. EGD 12/2022    Postoperative Diagnosis: Suboptimal prep and a narrowing at 70 cm (proximal transverse colon or hepatic flexure), too narrow to traverse with the scope but I was able to blindly introduce a biopsy forceps and took some biopsies    Anesthesia: MAC  Estimated blood loss: < 5 ml  Complications:  None    Description of Procedure:  Informed consent was obtained from the patient after explanation of the procedure including indications, description of the procedure,  benefits and possible risks and complications of the procedure, and alternatives. Questions were answered. The patient's history was reviewed and a directed physical examination was performed prior to the procedure. Patient was monitored throughout the procedure with pulse oximetry and periodic assessment of vital signs. Patient was sedated as noted above. The Nursing staff and I performed a time out. With the patient initially in the left lateral decubitus position, a digital rectal examination was performed and revealed negative without mass, lesions or tenderness. The Olympus video colonoscope was placed in the patient's rectum and advanced without difficulty  to the hepatic flexure. Photographs were taken. The prep was fair. Examination of the mucosa was performed during both introduction and withdrawal of the colonoscope. Retroflexed view of the rectum was performed.       Findings:     Suboptimal prep and a narrowing at 70 cm (proximal transverse colon or hepatic flexure), too narrow to traverse with the scope but I was able to blindly introduce a biopsy forceps and took some biopsies     Recommendations:  Await pathology.   Consider surgical resection even if the blind biopsy is not diagnostic  Will keep her for now on daily Nohemi Jenkins MD       O) [phone]        Delmer Mortimer, MD, MD

## 2023-02-27 NOTE — CONSULTS
GI Consult Note    History and Physical / Pre-Sedation Assessment  Patient:  Burgess Jones   :   1960     Intended Procedure: Colonoscopy      HPI: 61 yo with PMH of DM 2, HTN, CAD s/p PCI, bipolar disorder who has diarrhea, mild anemia and weight loss and abnormal CEA and abnormal PET scan in hepatic flexure, but poor prep in 2023. Current Facility-Administered Medications   Medication Dose Route Frequency Provider Last Rate Last Admin    oxyCODONE-acetaminophen (PERCOCET) 7.5-325 MG per tablet 1 tablet  1 tablet Oral Q8H PRN Codi Pedraza MD   1 tablet at 23 1106    insulin lispro (HUMALOG) injection vial 0-4 Units  0-4 Units SubCUTAneous TID WC Codi Pedraza MD        insulin lispro (HUMALOG) injection vial 0-4 Units  0-4 Units SubCUTAneous Nightly Codi Pedraza MD        glucose chewable tablet 16 g  4 tablet Oral PRN Codi Pedraza MD        dextrose bolus 10% 125 mL  125 mL IntraVENous PRN Codi Pedraza MD        Or    dextrose bolus 10% 250 mL  250 mL IntraVENous PRN Codi Pedraza MD        glucagon (rDNA) injection 1 mg  1 mg IntraMUSCular PRN Codi Pedraza MD        dextrose 10 % infusion   IntraVENous Continuous PRN Codi Pedraza MD        mupirocin (BACTROBAN) 2 % ointment   Nasal BID Lisa Sánchez MD   Given at 23 0817    atorvastatin (LIPITOR) tablet 20 mg  20 mg Oral Daily Codi Pedraza MD   20 mg at 23 0817    clonazePAM (KLONOPIN) tablet 0.5 mg  0.5 mg Oral TID PRN Codi Pedraza MD        calcium carb-cholecalciferol 250-3. 125 MG-MCG per tab 1 tablet  1 tablet Oral Daily Codi Pedraza MD   1 tablet at 23 0817    gabapentin (NEURONTIN) capsule 600 mg  600 mg Oral TID Codi Pedraza MD   600 mg at 23 0817    lisinopril (PRINIVIL;ZESTRIL) tablet 40 mg  40 mg Oral Daily Codi Pedraza MD   40 mg at 23 0818    paliperidone (INVEGA) extended release tablet 9 mg  9 mg Pt's daughter stated that her aunt referred her mother to [facility] but the one [place] [address] and has an appt in two weeks. Oral QAM Tj Garcia MD   9 mg at 02/27/23 0850    pantoprazole (PROTONIX) tablet 40 mg  40 mg Oral Daily Tj Garcia MD   40 mg at 02/27/23 0818    traZODone (DESYREL) tablet 200 mg  200 mg Oral Nightly Tj Garcia MD   200 mg at 02/27/23 0011    sodium chloride flush 0.9 % injection 5-40 mL  5-40 mL IntraVENous 2 times per day Tj Garcia MD   10 mL at 02/27/23 0818    sodium chloride flush 0.9 % injection 5-40 mL  5-40 mL IntraVENous PRN Tj Garcia MD        0.9 % sodium chloride infusion   IntraVENous PRN Tj Garcia MD        enoxaparin (LOVENOX) injection 40 mg  40 mg SubCUTAneous Daily Tj Garcia MD   40 mg at 02/27/23 0818    ondansetron (ZOFRAN-ODT) disintegrating tablet 4 mg  4 mg Oral Q8H PRN Tj Garcia MD        Or    ondansetron (ZOFRAN) injection 4 mg  4 mg IntraVENous Q6H PRN Tj Garcia MD   4 mg at 02/27/23 1035    polyethylene glycol (GLYCOLAX) packet 17 g  17 g Oral Daily PRN Tj Garcia MD        acetaminophen (TYLENOL) tablet 650 mg  650 mg Oral Q6H PRN Tj Garcia MD   650 mg at 02/27/23 0827    Or    acetaminophen (TYLENOL) suppository 650 mg  650 mg Rectal Q6H PRN Tj Garcia MD         Past Medical History:   Diagnosis Date    Abnormal brain MRI 7/20/2017    Partially empty sella and minimal chronic small vessel ischemic disease    Acute bilateral low back pain without sciatica 11/2/2016    SHARRON (acute kidney injury) ([facility] 7/5/2017    Arthritis     back    Bipolar disorder ([facility] 10/18/2008    CAD (coronary artery disease)     stent placed 6/8/20    Cancer ([facility] 2015    bilateral breast:s/p lumpectomy/radiation:under care care of breast specialist:Dr. Boone     Carotid stenosis, bilateral:<50%:per US 7/2016 7/15/2016    Carpal tunnel syndrome 10/18/2008    Cervical cancer screening 2014    Nml per pt'.     Coronary artery disease of native artery of native heart with stable angina pectoris ([facility] 6/9/2020    DDD (degenerative disc disease), lumbar 7/18/2018    Depression     under care of pschiatrist:Dr. 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 myelopathy or radiculopathy 3/10/2017    Transient cerebral ischemia 07/15/2016    TIA:7/10/16    Unspecified cerebral artery occlusion with cerebral infarction     TIA     Past Surgical History:   Procedure Laterality Date    BREAST LUMPECTOMY  2015    Bilateral:breast cancer    CARDIAC CATHETERIZATION  06/08/2020    Dr. Mike Degroot), DAYANA to Diag 1    COLONOSCOPY N/A 2/1/2021    COLONOSCOPY DIAGNOSTIC performed by Rudy Hennessy MD at [address]  2/3/2021    CT BONE MARROW BIOPSY 2/3/2021 Janie Arroyo MD [facility] AT [place] CT SCAN    HYSTERECTOMY (CERVIX STATUS UNKNOWN)      Benign:no cervical cancer per pt'    KIDNEY REMOVAL      right    OTHER SURGICAL HISTORY Right     orif right ankle    TEMPORAL ARTERY BIOPSY Right 8/9/2021    RIGHT TEMPORAL ARTERY BIOPSY LIGATION performed by Melly Mcneal MD at [address]  N/A 1/29/2021    EGD BIOPSY performed by Rudy Hennessy MD at [address] 12/15/2022    EGD BIOPSY performed by Rudy Hennessy MD at [facility] 4189 notes reviewed and agreed. Medications reviewed  Allergies: Allergies   Allergen Reactions    Morphine Anaphylaxis and Hives     feels like throat is closing    Penicillins Hives and Swelling    Codeine Hives and Rash    Penicillin G Rash           Physical Exam:  Vital Signs: /64   Pulse 68   Temp 98.4 °F (36.9 °C) (Oral)   Resp (!) 9   Ht 5' 3\" (1.6 m)   Wt 116 lb 13.5 oz (53 kg)   SpO2 100%   BMI 20.70 kg/m²  Body mass index is 20.7 kg/m². Airway:Normal  Cardiac:Normal  Pulmonary:Normal  Abdomen:Normal  Specific to procedure: none      Pre-Procedure Assessment/Plan:  ASA 3 - Patient with moderate systemic disease with functional limitations  Mallampati II  Level of Sedation Plan:Deep sedation    Post Procedure plan: Return to same level of care    I assessed the patient and find that the patient is in satisfactory condition to proceed with the planned procedure and sedation plan. I have explained the risk, benefits, and alternatives to the procedure. The patient understands and agrees to proceed.   Yes    Felicia Colorado MD       (O) [phone]  Brandon Lee MD  2:20 PM 2/27/2023

## 2023-02-27 NOTE — ACP (ADVANCE CARE PLANNING)
Advance Care Planning     General Advance Care Planning (ACP) Conversation    Date of Conversation: 2/26/2023  Conducted with: Patient with Decision Making Capacity    Healthcare Decision Maker:    Primary Decision Maker: Luis Chatman - [phone]    Secondary Decision Maker: Moi Fried - Brother/Sister - [phone]    Supplemental (Other) Decision Maker: Kevin Perez - Other - [phone]  Click here to complete Healthcare Decision Makers including selection of the Healthcare Decision Maker Relationship (ie \"Primary\"). Today we documented Decision Maker(s) consistent with Legal Next of Kin hierarchy.     Content/Action Overview:  Has NO ACP documents/care preferences - information provided, considering goals and options  Reviewed DNR/DNI and patient elects Full Code (Attempt Resuscitation)        Length of Voluntary ACP Conversation in minutes:  <16 minutes (Non-Billable)    Camden Clemons RN

## 2023-02-27 NOTE — PLAN OF CARE
VSS. NSR/SB on monitor. Patient is alert and oriented, complains of some pain at times, medication given with some relief. Patient had colonoscopy today at bedside. Patient NPO this AM for colonoscopy but started on regular card control diet after colonoscopy, tolerating well. Blood sugars stable off D10 drip. Turns self well for pressure ulcer prevention. Medication for DVT prophylaxis. Free from any injury. Patient and family updated on plan of care. Will continue plan of care.       Problem: Discharge Planning  Goal: Discharge to home or other facility with appropriate resources  Outcome: Progressing  Flowsheets (Taken 2/27/2023 0830)  Discharge to home or other facility with appropriate resources:   Identify barriers to discharge with patient and caregiver   Arrange for needed discharge resources and transportation as appropriate   Identify discharge learning needs (meds, wound care, etc)     Problem: Pain  Goal: Verbalizes/displays adequate comfort level or baseline comfort level  Outcome: Progressing  Flowsheets (Taken 2/27/2023 0400 by Lois Cross RN)  Verbalizes/displays adequate comfort level or baseline comfort level: Encourage patient to monitor pain and request assistance     Problem: Chronic Conditions and Co-morbidities  Goal: Patient's chronic conditions and co-morbidity symptoms are monitored and maintained or improved  Outcome: Progressing  Flowsheets (Taken 2/27/2023 0830)  Care Plan - Patient's Chronic Conditions and Co-Morbidity Symptoms are Monitored and Maintained or Improved: Monitor and assess patient's chronic conditions and comorbid symptoms for stability, deterioration, or improvement     Problem: Gastrointestinal - Adult  Goal: Minimal or absence of nausea and vomiting  Outcome: Progressing  Goal: Maintains or returns to baseline bowel function  Outcome: Progressing  Goal: Maintains adequate nutritional intake  Outcome: Progressing     Problem: Metabolic/Fluid and Electrolytes - Adult  Goal: Glucose maintained within prescribed range  Outcome: Progressing

## 2023-02-27 NOTE — ANESTHESIA PRE PROCEDURE
Department of Anesthesiology  Preprocedure Note       Name:  Evita Lucio   Age:  60 y.o.  :  1960                                          MRN:  [id number]         Date:  2023      Surgeon: Momo Xiong):  Jefry Skinner MD    Procedure: Procedure(s):  COLONOSCOPY DIAGNOSTIC    Medications prior to admission:   Prior to Admission medications    Medication Sig Start Date End Date Taking? Authorizing Provider   erythromycin [facility]) 5 MG/GM ophthalmic ointment Apply 1/2 inch strip 4-6 times daily for 7 days 23RANGEL Zuniga   acetaminophen (TYLENOL) 500 MG tablet Take 1 tablet by mouth 4 times daily as needed for Pain 23 RANGEL Hammonds   clonazePAM (KLONOPIN) 0.5 MG tablet Take 1 tablet by mouth 3 times daily as needed for Anxiety for up to 10 days. 22  Jake Minaya MD   gabapentin (NEURONTIN) 600 MG tablet Take 1 tablet by mouth 3 times daily for 10 days. 22  Jake Minaya MD   insulin glargine (LANTUS) 100 UNIT/ML injection vial Inject 30 Units into the skin every morning 22   Jake Minaya MD   insulin lispro, 1 Unit Dial, (HUMALOG/ADMELOG) 100 UNIT/ML SOPN With meals                   - at bedtime    < 150 ---   5 units            0 units  151-200 -- 6 units            0 units  201-250 :  7 units            1 units  251-300:   8 units            2 units  301-350:   9 units            3 units  >350 :       10 units          4 units  Upto 30 units/day 10/27/22   Adarsh Jaime MD   lisinopril (PRINIVIL;ZESTRIL) 40 MG tablet TAKE 1 TABLET BY MOUTH EVERY DAY 22   Adarsh Jaime MD   atorvastatin (LIPITOR) 20 MG tablet TAKE 1 TABLET BY MOUTH EVERY DAY 22   Adarsh Jaime MD   blood glucose test strips (TRUE METRIX BLOOD GLUCOSE TEST) strip As needed.   Patient taking differently: 3 each daily 22   Adarsh Jaime MD   Insulin Pen Needle 32G X 4 MM MISC 1 each by Does not apply route daily 7/22/22   Susana Cummings MD   Blood Glucose Monitoring Suppl (TRUE METRIX METER) w/Device KIT Used to  check blood sugar 3 times eyad 7/22/22   Susana Cummings MD   ticagrelor (BRILINTA) 90 MG TABS tablet TAKE 1 TABLET BY MOUTH TWICE A DAY 4/18/22   Meme Pollard MD   nitroGLYCERIN (NITROSTAT) 0.4 MG SL tablet up to max of 3 total doses.  If no relief after 1 dose, call 911. 9/29/21   Gena Sepulveda MD   paliperidone (INVEGA) 9 MG extended release tablet Take 9 mg by mouth every morning 3/15/21   Historical Provider, MD   pantoprazole (PROTONIX) 40 MG tablet Take 40 mg by mouth daily  4/3/21   Historical Provider, MD   ferrous sulfate (IRON 325) 325 (65 Fe) MG tablet Take 325 mg by mouth daily (with breakfast)    Historical Provider, MD   calcium carbonate-vitamin D (CALTRATE) 600-400 MG-UNIT TABS per tab Take 1 tablet by mouth daily  12/30/19   Historical Provider, MD   aspirin 81 MG tablet Take 81 mg by mouth daily    Historical Provider, MD   traZODone (DESYREL) 100 MG tablet Take 200 mg by mouth nightly     Historical Provider, MD       Current medications:    Current Facility-Administered Medications   Medication Dose Route Frequency Provider Last Rate Last Admin    oxyCODONE-acetaminophen (PERCOCET) 7.5-325 MG per tablet 1 tablet  1 tablet Oral Q8H PRN Lucia Singh MD   1 tablet at 02/27/23 1106    insulin lispro (HUMALOG) injection vial 0-4 Units  0-4 Units SubCUTAneous TID WC Lucia Singh MD        insulin lispro (HUMALOG) injection vial 0-4 Units  0-4 Units SubCUTAneous Nightly Lucia Singh MD        glucose chewable tablet 16 g  4 tablet Oral PRN Lucia Singh MD        dextrose bolus 10% 125 mL  125 mL IntraVENous PRN Lucia Singh MD        Or    dextrose bolus 10% 250 mL  250 mL IntraVENous PRN Lucia Singh MD        glucagon (rDNA) injection 1 mg  1 mg IntraMUSCular PRN Lucia Singh MD        dextrose 10 % infusion IntraVENous Continuous PRN MD Noble Caceres [facility]) 2 % ointment   Nasal BID Rachel Page MD   Given at 02/27/23 0817    atorvastatin (LIPITOR) tablet 20 mg  20 mg Oral Daily Chrystal Almeida MD   20 mg at 02/27/23 0817    clonazePAM (KLONOPIN) tablet 0.5 mg  0.5 mg Oral TID PRN Chrystal Almeida MD        calcium carb-cholecalciferol 250-3. 125 MG-MCG per tab 1 tablet  1 tablet Oral Daily Chrystal Almeida MD   1 tablet at 02/27/23 0817    gabapentin (NEURONTIN) capsule 600 mg  600 mg Oral TID Chrystal Almeida MD   600 mg at 02/27/23 0817    lisinopril (PRINIVIL;ZESTRIL) tablet 40 mg  40 mg Oral Daily Chrystal Almeida MD   40 mg at 02/27/23 0818    paliperidone (INVEGA) extended release tablet 9 mg  9 mg Oral QAM Chrystal Almeida MD   9 mg at 02/27/23 0850    pantoprazole (PROTONIX) tablet 40 mg  40 mg Oral Daily Chrystal Almeida MD   40 mg at 02/27/23 0818    traZODone (DESYREL) tablet 200 mg  200 mg Oral Nightly Chrystal Almeida MD   200 mg at 02/27/23 0011    sodium chloride flush 0.9 % injection 5-40 mL  5-40 mL IntraVENous 2 times per day Chrystal Almeida MD   10 mL at 02/27/23 0818    sodium chloride flush 0.9 % injection 5-40 mL  5-40 mL IntraVENous PRN Chrystal Almeida MD        0.9 % sodium chloride infusion   IntraVENous PRN Chrystal Almeida MD        enoxaparin (LOVENOX) injection 40 mg  40 mg SubCUTAneous Daily Chrystal Almeida MD   40 mg at 02/27/23 0818    ondansetron (ZOFRAN-ODT) disintegrating tablet 4 mg  4 mg Oral Q8H PRN Chrystal lAmeida MD        Or    ondansetron [facility]) injection 4 mg  4 mg IntraVENous Q6H PRN Chrystal Almeida MD   4 mg at 02/27/23 1035    polyethylene glycol (GLYCOLAX) packet 17 g  17 g Oral Daily PRN Chrystal Almeida MD        acetaminophen (TYLENOL) tablet 650 mg  650 mg Oral Q6H PRN Chrystal Almeida MD   650 mg at 02/27/23 0827    Or    acetaminophen (TYLENOL) suppository 650 mg  650 mg Rectal Q6H PRN Marina Sheridan MD           Allergies: Allergies   Allergen Reactions    Morphine Anaphylaxis and Hives     feels like throat is closing    Penicillins Hives and Swelling    Codeine Hives and Rash    Penicillin G Rash       Problem List:    Patient Active Problem List   Diagnosis Code    Acute hyperglycemia R73.9    Hypertension, uncontrolled I10    Bilateral malignant neoplasm of breast in female ([facility] C50.911, C50.912    Microalbuminuria R80.9    Obesity (BMI 30-39. 9) E66.9    Slurred speech R47.81    Hx of ischemic CVA I63.40    Brain metastases (HCC) C79.31    Carotid stenosis, bilateral:<50%:per US 7/2016 I65.23    SHARRON (acute kidney injury) ([facility]) N17.9    Lactic acidosis E87.20    Frequent falls R29.6    Depression/anxiety F41.8    Abnormal brain MRI R90.89    Acute bilateral low back pain without sciatica M54.50    Closed fracture of right ankle, with routine healing, subsequent encounter S82.891D    Stage 3a chronic kidney disease ([facility] N18.31    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 ([facility] E11.59    Dyslipidemia associated with type 2 diabetes mellitus ([facility] E11.69, E78.5    Bipolar disorder ([facility] F31.9    Cardiomegaly I51.7    Cardiomyopathy ([facility] I42.9    Carpal tunnel syndrome G56.00    Chronic systolic heart failure (HCC) I50.22    Diffuse cystic mastopathy N60.19    Edema R60.9    Gallstone pancreatitis K85.10    Gout M10.9    History of breast cancer Z85.3    History of renal cell carcinoma Z85.528    Cardiomyopathy in other diseases classified elsewhere I43    Mononeuritis G58.9    Myalgia and myositis [id number]    Nonspecific abnormal electrocardiogram (ECG) (EKG) R94.31    Patient in clinical research study Z00.6    Peroneal muscular atrophy G60.0    Scoliosis (and kyphoscoliosis), idiopathic M41.20    SOBOE (shortness of breath on exertion) R06.02    Syncope and collapse R55    Chronic pain disorder G89.4   • DDD (degenerative disc disease), lumbar M51.36   • Diabetic polyneuropathy associated with type 2 diabetes mellitus ([facility]) E11.42   • Other secondary scoliosis, lumbosacral region M41.57   • Thoracic spondylosis without myelopathy M47.814   • Morbid obesity due to excess calories ([facility]) E66.01   • Age-related nuclear cataract of both eyes H25.13   • Hypermetropia, bilateral H52.03   • Hypertensive retinopathy, bilateral H35.033   • Vitreous degeneration, bilateral H43.813   • Acute bilateral low back pain with left-sided sciatica M54.42   • History of CVA (cerebrovascular accident) without residual deficits Z86.73   • 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 stage 3 chronic kidney disease ([facility]) I13.0, I50.22, N18.30   • S/P mastectomy, right Z90.11   • Acute cystitis without hematuria N30.00   • Hypomagnesemia E83.42   • Chest pain R07.9   • CAD S/P percutaneous coronary angioplasty I25.10, Z98.61   • Hyperglycemia due to type 2 diabetes mellitus ([facility]) E11.65   • Hx of heart artery stent Z95.5   • Nuclear senile cataract H25.10   • Unintentional weight loss R63.4   • Chronic anemia D64.9   • Facial abscess L02.01   • Asymptomatic bacteriuria R82.71   • Acute encephalopathy G93.40   • Tobacco use disorder F17.200   • Severe malnutrition ([facility]) E43   • Acute right eye pain H57.11   • Suspected condition R69   • Bipolar affective disorder, currently depressed, moderate ([facility]) F31.32   • Seasonal allergies J30.2   • Symptomatic bradycardia R00.1   • Dyspnea R06.00   • Diffuse pain R52   • Hypoglycemia due to insulin E16.0, T38.3X5A       Past Medical History:        Diagnosis Date   • Abnormal brain MRI 7/20/2017    Partially empty sella and minimal chronic small vessel ischemic disease   • Acute bilateral low back pain without sciatica 11/2/2016   • SHARRON (acute kidney injury) ([facility]) 7/5/2017   • Arthritis     back   • Bipolar disorder ([facility]) 10/18/2008   • CAD  (coronary artery disease)     stent placed 6/8/20    Cancer [facility]) 2015    bilateral breast:s/p lumpectomy/radiation:under care care of breast specialist:Dr. Boone     Carotid stenosis, bilateral:<50%:per US 7/2016 7/15/2016    Carpal tunnel syndrome 10/18/2008    Cervical cancer screening 2014    Nml per pt'.  Coronary artery disease of native artery of native heart with stable angina pectoris ([facility] 6/9/2020    DDD (degenerative disc disease), lumbar 7/18/2018    Depression     under care of pschiatrist:Dr. Losi Brownlee    Depression/anxiety 7/5/2017    Depression/anxiety     Diabetes mellitus ([facility]     Gout     History of mammogram 10/28/2016;8/14/17    Negative    History of therapeutic radiation     Hyperlipidemia     Hypertension     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 stage 3 chronic kidney disease ([address] 9/17/2017    Microalbuminuria 7/1/2016    Neuropathy in diabetes ([facility]     Non morbid obesity 7/1/2016    Pancreatitis 5/12/16    [facility] hospitalization 5/12/16-5/16/16:under care of GI:chronic pancreatitis    S/P endoscopy 6/14/2016    [facility]:per pt' & her family member was nml.     Scoliosis     Spondylosis of lumbar region without myelopathy or radiculopathy 3/10/2017    Transient cerebral ischemia 07/15/2016    TIA:7/10/16    Unspecified cerebral artery occlusion with cerebral infarction     TIA       Past Surgical History:        Procedure Laterality Date    BREAST LUMPECTOMY  2015    Bilateral:breast cancer    CARDIAC CATHETERIZATION  06/08/2020    Dr. Yuli Hubbard), DAYANA to Diag 1    COLONOSCOPY N/A 2/1/2021    COLONOSCOPY DIAGNOSTIC performed by Noemí Padilla MD at [place] [address]  2/3/2021    CT BONE MARROW BIOPSY 2/3/2021 Gordon Howard MD [facility] CT SCAN    HYSTERECTOMY (CERVIX STATUS UNKNOWN)      Benign:no cervical cancer per pt'    KIDNEY REMOVAL      right    OTHER SURGICAL HISTORY Right orif right ankle    TEMPORAL ARTERY BIOPSY Right 2021    RIGHT TEMPORAL ARTERY BIOPSY LIGATION performed by Gemma Pelayo MD at [facility] ENDOSCOPY N/A 2021    EGD BIOPSY performed by Ayaan Burgess MD at [address] [facility] N/A 12/15/2022    EGD BIOPSY performed by Ayaan Burgess MD at [address] [place] [address] History:    Social History     Tobacco Use    Smoking status: Former     Packs/day: 0.50     Years: 20.00     Pack years: 10.00     Types: Cigarettes, Cigars     Quit date: 7/3/2014     Years since quittin.6    Smokeless tobacco: Never   Substance Use Topics    Alcohol use: No     Alcohol/week: 0.0 standard drinks                                Counseling given: Not Answered      Vital Signs (Current):   Vitals:    23 1200 23 1202 23 1300 23 1400   BP: 139/62 139/62 (!) 124/51 136/64   Pulse: 67 69 69 68   Resp: 14 (!) 9     Temp:       TempSrc:       SpO2:  100%     Weight:       Height:                                                  BP Readings from Last 3 Encounters:   23 136/64   23 (!) 164/68   23 (!) 186/59       NPO Status: Time of last liquid consumption: 200                        Time of last solid consumption:                         Date of last liquid consumption: 23                        Date of last solid food consumption: 23    BMI:   Wt Readings from Last 3 Encounters:   23 116 lb 13.5 oz (53 kg)   02/10/23 119 lb (54 kg)   23 127 lb (57.6 kg)     Body mass index is 20.7 kg/m².     CBC:   Lab Results   Component Value Date/Time    WBC 9.3 2023 07:30 PM    RBC 3.66 2023 07:30 PM    HGB 10.1 2023 07:30 PM    HCT 33.0 2023 07:30 PM    MCV 90.0 2023 07:30 PM    RDW 14.1 2023 07:30 PM     2023 07:30 PM       CMP:   Lab Results   Component Value Date/Time     2023 07:10 AM    K 3.9 02/27/2023 07:10 AM    K 3.6 02/26/2023 07:30 PM     02/27/2023 07:10 AM    CO2 23 02/27/2023 07:10 AM    BUN 13 02/27/2023 07:10 AM    CREATININE 0.9 02/27/2023 07:10 AM    GFRAA >60 09/27/2022 09:41 AM    GFRAA >60 05/29/2013 01:53 PM    AGRATIO 0.9 02/26/2023 07:30 PM    LABGLOM >60 02/27/2023 07:10 AM    GLUCOSE 144 02/27/2023 07:10 AM    PROT 7.1 02/26/2023 07:30 PM    PROT 8.1 01/05/2013 10:34 PM    CALCIUM 8.9 02/27/2023 07:10 AM    BILITOT 0.3 02/26/2023 07:30 PM    ALKPHOS 162 02/26/2023 07:30 PM    AST 39 02/26/2023 07:30 PM    ALT 40 02/26/2023 07:30 PM       POC Tests:   Recent Labs     02/27/23  1156   POCGLU 96       Coags:   Lab Results   Component Value Date/Time    PROTIME 12.8 12/14/2022 05:12 AM    INR 0.97 12/14/2022 05:12 AM    APTT 24.8 12/11/2022 04:45 AM       HCG (If Applicable): No results found for: PREGTESTUR, PREGSERUM, HCG, HCGQUANT     ABGs:   Lab Results   Component Value Date/Time    PHART 7.414 06/26/2015 11:10 AM    PO2ART 66.8 06/26/2015 11:10 AM    LTE2WSR 42.0 06/26/2015 11:10 AM    DPF6CWG 26.3 06/26/2015 11:10 AM    BEART 1.5 06/26/2015 11:10 AM    B3FGZWWW 93.4 06/26/2015 11:10 AM        Type & Screen (If Applicable):  No results found for: LABABO, LABRH    Drug/Infectious Status (If Applicable):  No results found for: HIV, HEPCAB    COVID-19 Screening (If Applicable):   Lab Results   Component Value Date/Time    COVID19 NOT DETECTED 12/10/2022 12:57 PM    COVID19 Not Detected 01/28/2021 12:41 PM           Anesthesia Evaluation  Patient summary reviewed and Nursing notes reviewed no history of anesthetic complications:   Airway: Mallampati: II     Neck ROM: full     Dental:          Pulmonary:   (+) shortness of breath:                             Cardiovascular:    (+) hypertension:, angina:, CAD:, CHF:, CHAMBERS:,                   Neuro/Psych:   (+) CVA:, neuromuscular disease:, psychiatric history:            GI/Hepatic/Renal:             Endo/Other: (+) Diabetes, . Abdominal:             Vascular: Other Findings:           Anesthesia Plan      MAC     ASA 4     (Medications & allergies reviewed  All available lab & EKG data reviewed)  Induction: intravenous. Anesthetic plan and risks discussed with patient. Plan discussed with CRNA.                     Bijan Smith MD   2/27/2023

## 2023-02-27 NOTE — PROGRESS NOTES
Patient had 2 BS >200. D10 stopped as per provider request and sliding scale will be started at 0700. Patient continues on [facility] Accuchecks.

## 2023-02-28 ENCOUNTER — APPOINTMENT (OUTPATIENT)
Dept: ULTRASOUND IMAGING | Age: 63
DRG: 329 | End: 2023-02-28
Payer: COMMERCIAL

## 2023-02-28 LAB
ANION GAP SERPL CALCULATED.3IONS-SCNC: 10 MMOL/L (ref 3–16)
BASOPHILS ABSOLUTE: 0 K/UL (ref 0–0.2)
BASOPHILS RELATIVE PERCENT: 0.3 %
BUN BLDV-MCNC: 13 MG/DL (ref 7–20)
CALCIUM SERPL-MCNC: 8.5 MG/DL (ref 8.3–10.6)
CHLORIDE BLD-SCNC: 108 MMOL/L (ref 99–110)
CO2: 18 MMOL/L (ref 21–32)
CREAT SERPL-MCNC: 1.6 MG/DL (ref 0.6–1.2)
EOSINOPHILS ABSOLUTE: 0 K/UL (ref 0–0.6)
EOSINOPHILS RELATIVE PERCENT: 1 %
ESTIMATED AVERAGE GLUCOSE: 185.8 MG/DL
GFR SERPL CREATININE-BSD FRML MDRD: 36 ML/MIN/{1.73_M2}
GLUCOSE BLD-MCNC: 172 MG/DL (ref 70–99)
GLUCOSE BLD-MCNC: 232 MG/DL (ref 70–99)
GLUCOSE BLD-MCNC: 274 MG/DL (ref 70–99)
GLUCOSE BLD-MCNC: 277 MG/DL (ref 70–99)
GLUCOSE BLD-MCNC: 312 MG/DL (ref 70–99)
GLUCOSE BLD-MCNC: 316 MG/DL (ref 70–99)
GLUCOSE BLD-MCNC: 330 MG/DL (ref 70–99)
GLUCOSE BLD-MCNC: 346 MG/DL (ref 70–99)
HBA1C MFR BLD: 8.1 %
HCT VFR BLD CALC: 27.4 % (ref 36–48)
HEMOGLOBIN: 8.5 G/DL (ref 12–16)
LYMPHOCYTES ABSOLUTE: 1.5 K/UL (ref 1–5.1)
LYMPHOCYTES RELATIVE PERCENT: 34.1 %
MCH RBC QN AUTO: 28 PG (ref 26–34)
MCHC RBC AUTO-ENTMCNC: 31.3 G/DL (ref 31–36)
MCV RBC AUTO: 89.5 FL (ref 80–100)
MONOCYTES ABSOLUTE: 0.4 K/UL (ref 0–1.3)
MONOCYTES RELATIVE PERCENT: 8.4 %
NEUTROPHILS ABSOLUTE: 2.4 K/UL (ref 1.7–7.7)
NEUTROPHILS RELATIVE PERCENT: 56.2 %
PDW BLD-RTO: 14.1 % (ref 12.4–15.4)
PERFORMED ON: ABNORMAL
PLATELET # BLD: 143 K/UL (ref 135–450)
PMV BLD AUTO: 8.6 FL (ref 5–10.5)
POTASSIUM SERPL-SCNC: 5.6 MMOL/L (ref 3.5–5.1)
RBC # BLD: 3.06 M/UL (ref 4–5.2)
SODIUM BLD-SCNC: 136 MMOL/L (ref 136–145)
WBC # BLD: 4.3 K/UL (ref 4–11)

## 2023-02-28 PROCEDURE — 76770 US EXAM ABDO BACK WALL COMP: CPT

## 2023-02-28 PROCEDURE — 97116 GAIT TRAINING THERAPY: CPT

## 2023-02-28 PROCEDURE — 97110 THERAPEUTIC EXERCISES: CPT

## 2023-02-28 PROCEDURE — 2500000003 HC RX 250 WO HCPCS: Performed by: INTERNAL MEDICINE

## 2023-02-28 PROCEDURE — 2580000003 HC RX 258: Performed by: INTERNAL MEDICINE

## 2023-02-28 PROCEDURE — 6370000000 HC RX 637 (ALT 250 FOR IP): Performed by: INTERNAL MEDICINE

## 2023-02-28 PROCEDURE — 36415 COLL VENOUS BLD VENIPUNCTURE: CPT

## 2023-02-28 PROCEDURE — 2000000000 HC ICU R&B

## 2023-02-28 PROCEDURE — 97530 THERAPEUTIC ACTIVITIES: CPT

## 2023-02-28 PROCEDURE — 6360000002 HC RX W HCPCS: Performed by: INTERNAL MEDICINE

## 2023-02-28 PROCEDURE — 83036 HEMOGLOBIN GLYCOSYLATED A1C: CPT

## 2023-02-28 PROCEDURE — 97166 OT EVAL MOD COMPLEX 45 MIN: CPT

## 2023-02-28 PROCEDURE — 80048 BASIC METABOLIC PNL TOTAL CA: CPT

## 2023-02-28 PROCEDURE — 85025 COMPLETE CBC W/AUTO DIFF WBC: CPT

## 2023-02-28 PROCEDURE — 97162 PT EVAL MOD COMPLEX 30 MIN: CPT

## 2023-02-28 RX ORDER — HYDRALAZINE HYDROCHLORIDE 20 MG/ML
5 INJECTION INTRAMUSCULAR; INTRAVENOUS EVERY 4 HOURS PRN
Status: DISCONTINUED | OUTPATIENT
Start: 2023-02-28 | End: 2023-03-15 | Stop reason: HOSPADM

## 2023-02-28 RX ADMIN — ATORVASTATIN CALCIUM 20 MG: 10 TABLET, FILM COATED ORAL at 08:54

## 2023-02-28 RX ADMIN — OXYCODONE AND ACETAMINOPHEN 1 TABLET: 7.5; 325 TABLET ORAL at 20:42

## 2023-02-28 RX ADMIN — PALIPERIDONE 9 MG: 3 TABLET, EXTENDED RELEASE ORAL at 08:54

## 2023-02-28 RX ADMIN — POLYETHYLENE GLYCOL 3350 17 G: 17 POWDER, FOR SOLUTION ORAL at 08:55

## 2023-02-28 RX ADMIN — SODIUM CHLORIDE, PRESERVATIVE FREE 10 ML: 5 INJECTION INTRAVENOUS at 20:34

## 2023-02-28 RX ADMIN — INSULIN LISPRO 4 UNITS: 100 INJECTION, SOLUTION INTRAVENOUS; SUBCUTANEOUS at 20:35

## 2023-02-28 RX ADMIN — MUPIROCIN: 20 OINTMENT TOPICAL at 08:56

## 2023-02-28 RX ADMIN — SODIUM ZIRCONIUM CYCLOSILICATE 10 G: 10 POWDER, FOR SUSPENSION ORAL at 20:34

## 2023-02-28 RX ADMIN — INSULIN LISPRO 2 UNITS: 100 INJECTION, SOLUTION INTRAVENOUS; SUBCUTANEOUS at 11:27

## 2023-02-28 RX ADMIN — Medication 1 TABLET: at 08:54

## 2023-02-28 RX ADMIN — SODIUM BICARBONATE: 84 INJECTION, SOLUTION INTRAVENOUS at 15:02

## 2023-02-28 RX ADMIN — INSULIN LISPRO 3 UNITS: 100 INJECTION, SOLUTION INTRAVENOUS; SUBCUTANEOUS at 16:06

## 2023-02-28 RX ADMIN — GABAPENTIN 600 MG: 300 CAPSULE ORAL at 13:34

## 2023-02-28 RX ADMIN — INSULIN LISPRO 3 UNITS: 100 INJECTION, SOLUTION INTRAVENOUS; SUBCUTANEOUS at 04:58

## 2023-02-28 RX ADMIN — TRAZODONE HYDROCHLORIDE 200 MG: 50 TABLET ORAL at 20:34

## 2023-02-28 RX ADMIN — GABAPENTIN 600 MG: 300 CAPSULE ORAL at 08:54

## 2023-02-28 RX ADMIN — ASPIRIN 81 MG: 81 TABLET, COATED ORAL at 08:54

## 2023-02-28 RX ADMIN — TICAGRELOR 90 MG: 90 TABLET ORAL at 08:54

## 2023-02-28 RX ADMIN — OXYCODONE AND ACETAMINOPHEN 1 TABLET: 7.5; 325 TABLET ORAL at 10:45

## 2023-02-28 RX ADMIN — SODIUM CHLORIDE, PRESERVATIVE FREE 10 ML: 5 INJECTION INTRAVENOUS at 08:55

## 2023-02-28 RX ADMIN — PANTOPRAZOLE SODIUM 40 MG: 40 TABLET, DELAYED RELEASE ORAL at 08:54

## 2023-02-28 RX ADMIN — CLONAZEPAM 0.5 MG: 0.5 TABLET ORAL at 16:57

## 2023-02-28 RX ADMIN — TICAGRELOR 90 MG: 90 TABLET ORAL at 20:34

## 2023-02-28 RX ADMIN — GABAPENTIN 600 MG: 300 CAPSULE ORAL at 20:34

## 2023-02-28 RX ADMIN — LISINOPRIL 40 MG: 20 TABLET ORAL at 08:54

## 2023-02-28 RX ADMIN — ENOXAPARIN SODIUM 40 MG: 100 INJECTION SUBCUTANEOUS at 08:55

## 2023-02-28 RX ADMIN — HYDRALAZINE HYDROCHLORIDE 5 MG: 20 INJECTION INTRAMUSCULAR; INTRAVENOUS at 16:06

## 2023-02-28 RX ADMIN — SODIUM ZIRCONIUM CYCLOSILICATE 10 G: 10 POWDER, FOR SUSPENSION ORAL at 14:37

## 2023-02-28 ASSESSMENT — PAIN SCALES - GENERAL
PAINLEVEL_OUTOF10: 8
PAINLEVEL_OUTOF10: 7
PAINLEVEL_OUTOF10: 0
PAINLEVEL_OUTOF10: 9
PAINLEVEL_OUTOF10: 0
PAINLEVEL_OUTOF10: 0

## 2023-02-28 ASSESSMENT — PAIN DESCRIPTION - DESCRIPTORS
DESCRIPTORS: ACHING
DESCRIPTORS: ACHING

## 2023-02-28 ASSESSMENT — PAIN DESCRIPTION - LOCATION
LOCATION: BACK

## 2023-02-28 ASSESSMENT — PAIN DESCRIPTION - ORIENTATION: ORIENTATION: LOWER

## 2023-02-28 NOTE — PLAN OF CARE
VSS. NSR on monitor. Patient is alert and oriented, percocet for generalized back pain with releif. Patient on regular carb control diet, tolerating well, great appetite. Patient given lo Marie Nasuti today for hyperkalemia. Started on sodium bicarbonate drip today. Turns self well for pressure ulcer prevention. Medication for DVT prophylaxis. Free from any injury. Patient and family updated on plan of care. Will continue plan of care.      Problem: Discharge Planning  Goal: Discharge to home or other facility with appropriate resources  Outcome: Progressing  Flowsheets (Taken 2/28/2023 0800)  Discharge to home or other facility with appropriate resources:   Identify barriers to discharge with patient and caregiver   Arrange for needed discharge resources and transportation as appropriate   Identify discharge learning needs (meds, wound care, etc)     Problem: Pain  Goal: Verbalizes/displays adequate comfort level or baseline comfort level  Outcome: Progressing     Problem: Chronic Conditions and Co-morbidities  Goal: Patient's chronic conditions and co-morbidity symptoms are monitored and maintained or improved  Outcome: Progressing  Flowsheets (Taken 2/28/2023 0800)  Care Plan - Patient's Chronic Conditions and Co-Morbidity Symptoms are Monitored and Maintained or Improved:   Monitor and assess patient's chronic conditions and comorbid symptoms for stability, deterioration, or improvement   Collaborate with multidisciplinary team to address chronic and comorbid conditions and prevent exacerbation or deterioration     Problem: Gastrointestinal - Adult  Goal: Minimal or absence of nausea and vomiting  Outcome: Progressing  Flowsheets (Taken 2/28/2023 1600)  Minimal or absence of nausea and vomiting:   Administer ordered antiemetic medications as needed   Advance diet as tolerated, if ordered   Administer IV fluids as ordered to ensure adequate hydration  Goal: Maintains or returns to baseline bowel function  Outcome: Progressing  Flowsheets (Taken 2/28/2023 1600)  Maintains or returns to baseline bowel function:   Assess bowel function   Encourage oral fluids to ensure adequate hydration   Administer IV fluids as ordered to ensure adequate hydration  Goal: Maintains adequate nutritional intake  Outcome: Progressing  Flowsheets (Taken 2/28/2023 1600)  Maintains adequate nutritional intake:   Monitor percentage of each meal consumed   Identify factors contributing to decreased intake, treat as appropriate     Problem: Metabolic/Fluid and Electrolytes - Adult  Goal: Glucose maintained within prescribed range  Outcome: Progressing  Flowsheets (Taken 2/28/2023 0800)  Glucose maintained within prescribed range:   Monitor blood glucose as ordered   Assess for signs and symptoms of hyperglycemia and hypoglycemia  Goal: Electrolytes maintained within normal limits  Outcome: Progressing

## 2023-02-28 NOTE — PROGRESS NOTES
Hospitalist Progress Note      PCP: Viridiana Blanco    Date of Admission: 2/26/2023    Chief Complaint: less responsive       Subjective: she was worn out when I saw her this AM.  Hungry.  Thinking clearly.         Medications:  Reviewed    Infusion Medications    dextrose      sodium chloride       Scheduled Medications    insulin lispro  0-4 Units SubCUTAneous TID WC    insulin lispro  0-4 Units SubCUTAneous Nightly    mupirocin   Nasal BID    polyethylene glycol  17 g Oral Daily    atorvastatin  20 mg Oral Daily    calcium carb-cholecalciferol  1 tablet Oral Daily    gabapentin  600 mg Oral TID    lisinopril  40 mg Oral Daily    paliperidone  9 mg Oral QAM    pantoprazole  40 mg Oral Daily    traZODone  200 mg Oral Nightly    sodium chloride flush  5-40 mL IntraVENous 2 times per day    enoxaparin  40 mg SubCUTAneous Daily     PRN Meds: oxyCODONE-acetaminophen, glucose, dextrose bolus **OR** dextrose bolus, glucagon (rDNA), dextrose, clonazePAM, sodium chloride flush, sodium chloride, ondansetron **OR** ondansetron, polyethylene glycol, acetaminophen **OR** acetaminophen      Intake/Output Summary (Last 24 hours) at 2/27/2023 2026  Last data filed at 2/27/2023 1844  Gross per 24 hour   Intake 930 ml   Output --   Net 930 ml       Physical Exam Performed:    /69   Pulse 66   Temp 98.4 °F (36.9 °C) (Oral)   Resp 17   Ht 5' 3\" (1.6 m)   Wt 116 lb 13.5 oz (53 kg)   SpO2 100%   BMI 20.70 kg/m²     General appearance: No apparent distress, appears stated age and cooperative.  HEENT: Pupils equal, round, and reactive to light. Conjunctivae/corneas clear.  Neck: Supple, with full range of motion. No jugular venous distention. Trachea midline.  Respiratory:  Normal respiratory effort. Clear to auscultation, bilaterally without Rales/Wheezes/Rhonchi.  Cardiovascular: Regular rate and rhythm with normal S1/S2 without murmurs, rubs or gallops.  Abdomen: Soft, non-tender, non-distended with normal bowel  sounds. Musculoskeletal: No clubbing, cyanosis or edema bilaterally. Full range of motion without deformity. Skin: Skin color, texture, turgor normal.  No rashes or lesions. Neurologic:  Neurovascularly intact without any focal sensory/motor deficits. Cranial nerves: II-XII intact, grossly non-focal.  Psychiatric: Alert and oriented, thought content appropriate, normal insight  Capillary Refill: Brisk, 3 seconds, normal   Peripheral Pulses: +2 palpable, equal bilaterally       Labs:   Recent Labs     02/26/23 1930   WBC 9.3   HGB 10.1*   HCT 33.0*        Recent Labs     02/26/23 1930 02/27/23  0710   * 139   K 3.6 3.9    106   CO2 19* 23   BUN 13 13   CREATININE 0.9 0.9   CALCIUM 8.5 8.9     Recent Labs     02/26/23 1930   AST 39*   ALT 40   BILITOT 0.3   ALKPHOS 162*     No results for input(s): INR in the last 72 hours. No results for input(s): Rc Doss in the last 72 hours. Urinalysis:      Lab Results   Component Value Date/Time    NITRU Negative 12/10/2022 01:42 PM    WBCUA 3-5 12/10/2022 01:42 PM    BACTERIA 2+ 12/10/2022 01:42 PM    RBCUA 5-10 12/10/2022 01:42 PM    BLOODU MODERATE 12/10/2022 01:42 PM    SPECGRAV >=1.030 12/10/2022 01:42 PM    GLUCOSEU >=1000 12/10/2022 01:42 PM    GLUCOSEU >=1000 mg/dL 06/07/2010 03:38 PM       Radiology:  CT HEAD WO CONTRAST   Final Result   No acute intracranial abnormality. Mild cerebral white matter chronic microvascular ischemic disease. Chronic right maxillary sinus opacification in setting of chronic sinusitis.              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 Patient was apparently found unresponsive on the toilet while doing her bowel prep for a colonoscopy procedure scheduled tomorrow with [facility]. She also supposed to get an EGD. Patient reports she remembers getting diaphoretic and weak just prior to using the bathroom. And after she sat on the toilet she became very lightheaded and lost her consciousness. She reports she takes 22 units of Lantus in the morning which she did. Was instructed not to eat anything so she drank some water and Gatorade in the morning. She also proceeded to take 8 units of the short acting in the morning. Patient reports she is not aware that she is not supposed to take the short acting insulin with a meal if she was not going to eat anything for the meal.  She has been on insulin and a diabetic for about 10 years. EMS was called, and they reported her blood sugar was 40 at the scene. \"        Hypoglycemia, with acute metabolic encephalopathy present on admission. Required D10 gtt, then weaned off.     DM2. SSI for now. F/u A1c. Unexplained weight loss, with elevated CEA and abnormal PET-CT at the hepatic flexure of the colon. This is why she had the outpatient endoscopy scheduled. Per GI regarding the colonoscopy: \"narrowing at 70 cm (proximal transverse colon or hepatic flexure), too narrow to traverse with the scope but I was able to blindly introduce a biopsy forceps and took some biopsies. Await pathology. Consider surgical resection even if the blind biopsy is not diagnostic. Will keep her for now on daily Miralax. \"    HTN. Lisinopril. GERD.  PPI. Hx of breast cancer s/p lumpectomy and radiation     CAD-stable and asymptomatic. DAPT, statin.          DVT Prophylaxis: enoxaparin  Diet: ADULT DIET; Regular; 5 carb choices (75 gm/meal)  Code Status: Full Code  PT/OT Eval Status: eval ordered    Dispo - likely home 2/28, pending PT/OT and GI input.       Appropriate for A1 Discharge Unit: Amee Juarez MD

## 2023-02-28 NOTE — PROGRESS NOTES
Physical Therapy  Facility/Department: [facility] C2 CARD TELEMETRY  Physical Therapy Initial Assessment/Treatment    Name: Gabriela Fox  : 1960  MRN: [id number]  Date of Service: 2023    Discharge Recommendations:  24 hour supervision or assist, Home with Home health PT   PT Equipment Recommendations  Equipment Needed: No      Patient Diagnosis(es): The primary encounter diagnosis was Hypoglycemia. Diagnoses of Confusion and Screening for colon cancer were also pertinent to this visit. Past Medical History:  has a past medical history of Abnormal brain MRI, Acute bilateral low back pain without sciatica, SHARRON (acute kidney injury) ([address] Arthritis, Bipolar disorder ([address] CAD (coronary artery disease), Cancer ([address] Carotid stenosis, bilateral:<50%:per [place] 2016, Carpal tunnel syndrome, Cervical cancer screening, Coronary artery disease of native artery of native heart with stable angina pectoris ([address] DDD (degenerative disc disease), lumbar, Depression, Depression/anxiety, Depression/anxiety, Diabetes mellitus ([address] Gout, History of mammogram, History of therapeutic radiation, Hyperlipidemia, Hypertension, 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 Past Surgical History:  has a past surgical history that includes Kidney removal; Hysterectomy; Breast lumpectomy (2015); Tubal ligation; other surgical history (Right); Cardiac catheterization (2020); Upper gastrointestinal endoscopy (N/A, 2021); Colonoscopy (N/A, 2021); CT BIOPSY BONE MARROW (2/3/2021); Temporal Artery Biopsy (Right, 2021);  Upper gastrointestinal endoscopy (N/A, 12/15/2022); and Colonoscopy (N/A, 2/27/2023). Assessment   Body Structures, Functions, Activity Limitations Requiring Skilled Therapeutic Intervention: Decreased functional mobility ; Decreased strength;Decreased safe awareness;Decreased balance; Increased pain  Assessment: Pt [facility] to [facility] with diagnosis of hypoglycemia and confusion. Pt was A&Ox4 but very lethargic and required constant cueing to remain alert and keep her eyes open throughout the evaluation. Pt reported that she lives alone in an apartment that is on the 3rd floor and has 12 steps with R handrail to get to her apartment. Prior to admission, pt states that she was IND with ADLs and IND with mobility without AD at baseline. Currently, pt is performing bed mobility with SBA, STS transfers with SBA and bed to chair transfer with CGA and no AD. Pt was able to ambulate 100ft with CGA and no AD this date. Pt had multiple bouts of instability with ambulation but pt was able to self-correct with CGA. Pt reported 9/10 chronic LBP this date that appears to be limiting mobility and activity participation at home and currently. Pt appears to be functioning below baseline and would continue to benefit from skilled therapy in the acute setting in order to address functional deficits and enhance pt independence. Recommend home with 24hr assist and HHPT upon d/c.   Treatment Diagnosis: Impaired functional mobility  Therapy Prognosis: Fair  Decision Making: Medium Complexity  Requires PT Follow-Up: Yes  Activity Tolerance  Activity Tolerance: Patient limited by fatigue;Patient tolerated evaluation without incident     Plan   Physcial Therapy Plan  General Plan: 3-5 times per week  Current Treatment Recommendations: Strengthening, Balance training, Functional mobility training, Transfer training, Endurance training, Gait training, Stair training, Home exercise program, Safety education & training, Therapeutic activities  Safety Devices  Type of Devices: Gait belt, Nurse notified, Left in bed, Bed alarm in place, Call light within reach, Patient at risk for falls, All fall risk precautions in place  Restraints  Restraints Initially in Place: No     Restrictions  Restrictions/Precautions  Restrictions/Precautions: Fall Risk, General Precautions  Position Activity Restriction  Other position/activity restrictions: IV, tele     Subjective   Pain: Pt reports LBP 9/10  General  Chart Reviewed: Yes  Patient assessed for rehabilitation services?: Yes  Family / Caregiver Present: No  Referring Practitioner: Melinda Landa MD  Referral Date : 02/27/23  Diagnosis: Hypoglycemia due to insulin  Follows Commands: Within Functional Limits  General Comment  Comments: RN cleared pt for PT eval  Subjective  Subjective: Arrived and pt reported that she was tired but agreed to participate in PT eval     Social/Functional History  Social/Functional History  Lives With: Alone  Type of Home: [address] [place] [address] Drive: One level (pt apartment the 3rd floor)  Home Access: Stairs to enter with rails  Entrance Stairs - Number of Steps: 12  Entrance Stairs - Rails: Right  Bathroom Shower/Tub: Tub/Shower unit, Shower chair with back  Bathroom Toilet: Standard  Home Equipment: Yuliana Fragmahsa yang, [address] [place] [address], [place]  Has the patient had two or more falls in the past year or any fall with injury in the past year?: No  ADL Assistance: Independent  Homemaking Assistance: Independent  Homemaking Responsibilities: Yes  Ambulation Assistance: Independent  Transfer Assistance: Independent  Active : No  Patient's  Info: [id number]: go to [employer]  Additional Comments: Pt reports has friends nearby to assist when needed  Vision/Hearing  Vision  Vision: Impaired  Vision Exceptions: Cataracts  Hearing  Hearing: Within functional limits    Cognition   Orientation  Overall Orientation Status: Within Normal Limits  Orientation Level: Oriented X4     Objective   Vitals:  Sitting: BP: 156/72, HR: 66, SpO2: 98%  Standing: BP: 144/68     Gross Assessment  AROM: Within functional limits  Strength: Generally decreased, functional (BLE hip, knee, ankle grossly 4/5)  Coordination: Within functional limits  Sensation: Intact     Bed Mobility Training  Bed Mobility Training: Yes  Overall Level of Assistance: Stand-by assistance  Interventions: Verbal cues  Supine to Sit: Stand-by assistance; Additional time (HOB elevated)  Sit to Supine: Stand-by assistance; Additional time (HOB lowered)  Scooting: Stand-by assistance; Additional time (towards EOB)  Balance  Sitting: Intact  Standing: Impaired  Standing - Static: Fair (with CGA no AD)  Standing - Dynamic: Fair (with CGA no AD during ambulation)  Transfer Training  Transfer Training: Yes  Overall Level of Assistance: Stand-by assistance; Additional time;Contact-guard assistance  Interventions: Verbal cues; Safety awareness training  Sit to Stand: Stand-by assistance; Additional time  Stand to Sit: Stand-by assistance; Additional time  Bed to Chair: Contact-guard assistance; Additional time (with no AD)  Gait Training  Gait Training: Yes  Gait  Overall Level of Assistance: Contact-guard assistance; Additional time (CGA with no AD)  Interventions: Verbal cues; Safety awareness training  Base of Support: Widened  Speed/Olga: Shuffled; Slow  Step Length: Right shortened;Left shortened  Gait Abnormalities: Shuffling gait; Decreased step clearance; Path deviations  Multiple bouts of instability with ambulation, pt able to demo proper stepping strategy and self-correction to maintain balance  Distance (ft): 100 Feet  Assistive Device: Gait belt (no AD)    Exercise Treatment: Pt performed sitting ankle pumps, LAQ, marches x10 reps BLE     AM-PAC Score  AM-PAC Inpatient Mobility Raw Score : 21 (02/28/23 1242)  AM-PAC Inpatient T-Scale Score : 50.25 (02/28/23 1242)  Mobility Inpatient CMS 0-100% Score: 28.97 (02/28/23 1242)  Mobility Inpatient CMS G-Code Modifier : Toma Babin (02/28/23 1242) Goals  Short Term Goals  Time Frame for Short Term Goals: 7 days (3/7/23)  Short Term Goal 1: Pt will perform bed mobility with supervision  Short Term Goal 2: Pt will perform transfers with no AD and supervision  Short Term Goal 3: Pt will ambulate 200ft with no AD and SBA  Short Term Goal 4: Pt will negotiate 12 steps with R handrail and SBA  Short Term Goal 5: Pt will perform 12-15 reps BLE exercises by 3/3/23  Patient Goals   Patient Goals : \"to go home\"     Education  Patient Education  Education Given To: Patient  Education Provided: Role of Therapy;Plan of Care;Home Exercise Program;Transfer Training  Education Method: Verbal  Barriers to Learning: None  Education Outcome: Verbalized understanding;Demonstrated understanding;Continued education needed      Therapy Time   Individual Concurrent Group Co-treatment   Time In 1007         Time Out 1041         Minutes 34         Timed Code Treatment Minutes: 24 Minutes (10 min eval)     If pt is unable to be seen after this session, please let this note serve as discharge summary. Please see case management note for discharge disposition. Thank you.    Dipti Carreon,SPT

## 2023-02-28 NOTE — PROGRESS NOTES
Occupational Therapy  Facility/Department: [facility] C2 CARD TELEMETRY  Occupational Therapy Initial/discharge Assessment  1 x only       Name: Olman Woodard  : 1960  MRN: [id number]  Date of Service: 2023    Discharge Recommendations:  Home with assist PRN          Patient Diagnosis(es): The primary encounter diagnosis was Hypoglycemia. Diagnoses of Confusion and Screening for colon cancer were also pertinent to this visit. Past Medical History:  has a past medical history of Abnormal brain MRI, Acute bilateral low back pain without sciatica, SHARRON (acute kidney injury) ([address] Arthritis, Bipolar disorder ([address] CAD (coronary artery disease), Cancer ([address] Carotid stenosis, bilateral:<50%:per [place] 2016, Carpal tunnel syndrome, Cervical cancer screening, Coronary artery disease of native artery of native heart with stable angina pectoris ([address] DDD (degenerative disc disease), lumbar, Depression, Depression/anxiety, Depression/anxiety, Diabetes mellitus ([address] Gout, History of mammogram, History of therapeutic radiation, Hyperlipidemia, Hypertension, 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 Past Surgical History:  has a past surgical history that includes Kidney removal; Hysterectomy; Breast lumpectomy (); Tubal ligation; other surgical history (Right); Cardiac catheterization (2020); Upper gastrointestinal endoscopy (N/A, 2021); Colonoscopy (N/A, 2021); CT BIOPSY BONE MARROW (2/3/2021); Temporal Artery Biopsy (Right, 2021); Upper gastrointestinal endoscopy (N/A, 12/15/2022); and Colonoscopy (N/A, 2023).            Assessment No Skilled OT: At baseline function;Patient refusal  REQUIRES OT FOLLOW-UP: No  Activity Tolerance  Activity Tolerance: Patient Tolerated treatment well  Activity Tolerance Comments: semi-whiteside's on RA:  BP = 156/72, 98 % O 2 sats, HR = 65; sitting EOB:  BP = 144/68, HR = 76        Plan   OT  eval only     Restrictions  Restrictions/Precautions  Restrictions/Precautions: Fall Risk, General Precautions  Position Activity Restriction  Other position/activity restrictions: telemetry    Subjective   General  Chart Reviewed: Yes  Patient assessed for rehabilitation services?: Yes  Family / Caregiver Present: No  Referring Practitioner: Dr. Orquidea Marc  Diagnosis: confusion, hypoglycemia  Subjective  Subjective: pt reports choninc back pain at rest in bed & with activity, requested pain meds; reports fatigue; \"I just got out of rehab., I am not going back\"  General Comment  Comments: RN cleared pt for OT eval; pt resting in bed, minimally agreeable to therapy eval     Social/Functional History  Social/Functional History  Lives With: Alone  Type of Home: Apartment  Home Layout: One level (pt apartment the 3rd floor)  Home Access: Stairs to enter with rails  Entrance Stairs - Number of Steps: 12  Entrance Stairs - Rails: Right  Bathroom Shower/Tub: Tub/Shower unit, Shower chair with back  Bathroom Toilet: Standard  Home Equipment: Vance Alskodyom, mahsa, Cane  Has the patient had two or more falls in the past year or any fall with injury in the past year?: Yes  Receives Help From: Friend(s)  ADL Assistance: Independent  Homemaking Assistance: Independent  Homemaking Responsibilities: Yes  Ambulation Assistance: Independent  Transfer Assistance: Needs assistance (\"friends stays with me when I get in/out of tub\")  Active : No  Patient's  Info: Friends  Leisure & Hobbies: go to [employer]  Additional Comments: Pt reports has friends nearby to assist when needed       Objective   Heart Rate: 62  Heart Rate Source: Monitor  BP: (!) 150/74  BP Location: Right upper arm  BP Method: Automatic  Patient Position: Semi fowlers  MAP (Calculated): 99  Resp: 13  SpO2: 100 %  O2 Device: None (Room air)             Safety Devices  Type of Devices: Gait belt;Nurse notified; Left in bed;Bed alarm in place;Call light within reach; Patient at risk for falls; All fall risk precautions in place  Restraints  Restraints Initially in Place: No       AROM: Within functional limits  Strength: Generally decreased, functional  Coordination: Within functional limits  Tone: Normal    ADL  Feeding: Independent  Grooming: Supervision (standing at sink to wash hands and face)     Activity Tolerance  Activity Tolerance: Patient limited by fatigue;Patient tolerated evaluation without incident  Bed mobility  Supine to Sit: Modified independent  Sit to Supine: Stand by assistance  Transfers  Sit to stand: Stand by assistance  Stand to sit: Stand by assistance  Transfer Comments: pt lethargic, cues to keep eyes open when walking with RW    Vision  Vision: Impaired  Vision Exceptions: Cataracts    Hearing  Hearing: Within functional limits    Cognition  Overall Cognitive Status: Exceptions  Arousal/Alertness: Delayed responses to stimuli  Following Commands:  Follows one step commands with increased time  Attention Span: Attends with cues to redirect  Memory: Decreased recall of recent events  Safety Judgement: Decreased awareness of need for safety  Insights: Fully aware of deficits  Initiation: Requires cues for some  Sequencing: Does not require cues  Orientation  Overall Orientation Status: Within Normal Limits  Orientation Level: Oriented X4               Exercise Treatment: BUE AROM seated in chair       AM-PAC Score  Self care score = 20; G-code = CJ     Tinneti Score       Goals  Patient Goals   Patient goals : \"go home\"       Therapy Time   Individual Concurrent Group Co-treatment   Time In  1006         Time Out  1041         Minutes  35                 Sergio Franco [address], [facility]

## 2023-02-28 NOTE — PROGRESS NOTES
PROGRESS NOTE    HPI: Geoff Vines is a(n)62 y.o. female admitted for work-up and treatment for Confusion [R41.0]  Hypoglycemia [E16.2]  Hypoglycemia due to insulin [E16.0, T38.3X5A]. We are following for abnormal colonoscopy findings. Subjective:     Complains of some abdominal pain today. No nausea or vomiting. Says she had a BM this morning. Objective:     I/O last 3 completed shifts: In: 930 [P.O.:480; I.V.:450]  Out: -       /60   Pulse 63   Temp 99 °F (37.2 °C) (Oral)   Resp 15   Ht 5' 3\" (1.6 m)   Wt 116 lb 13.5 oz (53 kg)   SpO2 99%   BMI 20.70 kg/m²     Physical Exam:  HEENT: anicteric sclera, oropharyngeal membranes pink and moist.  Cor: RRR  Lungs: non-labored, no respiratory distress  Abdomen: distended but soft, + mild mid abdominal tenderness  Extremities: no edema  Neuro: alert and oriented x 3      Results:   Lab Results   Component Value Date    ALT 40 02/26/2023    AST 39 (H) 02/26/2023     (H) 06/03/2021    ALKPHOS 162 (H) 02/26/2023    BILIDIR <0.2 02/10/2023    PROT 7.1 02/26/2023    LABALBU 3.3 (L) 02/26/2023    INR 0.97 12/14/2022    LIPASE 5.0 (L) 02/10/2023     Lab Results   Component Value Date    WBC 4.3 02/28/2023    HGB 8.5 (L) 02/28/2023    HCT 27.4 (L) 02/28/2023    MCV 89.5 02/28/2023     02/28/2023     BUN/Cr/glu/ALT/AST/amyl/lip:  13/1.6/--/--/--/--/-- (02/28 0338)  CT HEAD WO CONTRAST    Result Date: 2/26/2023  No acute intracranial abnormality. Mild cerebral white matter chronic microvascular ischemic disease. Chronic right maxillary sinus opacification in setting of chronic sinusitis. Impression:  60year old female with diarrhea, anemia, weight loss and abnormal PET scan of the colon, elevated CEA. Admitted with hypoglycemia following bowel prep for outpatient colonoscopy.   Underwent inpatient scope yesterday with findings of narrowing at around the proximal transverse colon or hepatic flexure. Unable to traverse with scope and poor prep prevented optimal evaluation. Her biopsies are pending. Plan:  Follow-up pathology. May need surgical evaluation of she has signs of obstruction despite path results. 3. Continue daily miralax. 4. Low fiber diet. Please do not hesitate to call with questions or concerns.       Electronically signed by: SARTHAK Johnson 2/28/2023 10:39 AM

## 2023-02-28 NOTE — CARE COORDINATION
Chart reviewed, PT recommending home with HH PT and 24hr supervision/assist, OT only recommending home w assist PRN. Writer met with pt, she has had home care in the past and is agreeable again, does not remember name of agency and has no preference. Pt does not have 24hr assist but has friends/neighbors who can help as needed. Writer gave referral to [facility], will see if can service. CM will continue to follow pt's progress and coordinate discharge arrangements as appropriate.   MANINDER Chavira-RN

## 2023-02-28 NOTE — PROGRESS NOTES
Hospitalist Progress Note      PCP: Obedus Stewart    Date of Admission: 2/26/2023    Chief Complaint: less responsive       Subjective:  She is more alert and energetic today. Tolerating PO. Medications:  Reviewed    Infusion Medications    dextrose      sodium chloride       Scheduled Medications    sodium zirconium cyclosilicate  10 g Oral TID    insulin lispro  0-4 Units SubCUTAneous TID WC    insulin lispro  0-4 Units SubCUTAneous Nightly    mupirocin   Nasal BID    polyethylene glycol  17 g Oral Daily    ticagrelor  90 mg Oral BID    aspirin  81 mg Oral Daily    atorvastatin  20 mg Oral Daily    calcium carb-cholecalciferol  1 tablet Oral Daily    gabapentin  600 mg Oral TID    [Held by provider] lisinopril  40 mg Oral Daily    paliperidone  9 mg Oral QAM    pantoprazole  40 mg Oral Daily    traZODone  200 mg Oral Nightly    sodium chloride flush  5-40 mL IntraVENous 2 times per day    enoxaparin  40 mg SubCUTAneous Daily     PRN Meds: hydrALAZINE, oxyCODONE-acetaminophen, glucose, dextrose bolus **OR** dextrose bolus, glucagon (rDNA), dextrose, clonazePAM, sodium chloride flush, sodium chloride, ondansetron **OR** ondansetron, polyethylene glycol, acetaminophen **OR** acetaminophen      Intake/Output Summary (Last 24 hours) at 2/28/2023 1416  Last data filed at 2/28/2023 0859  Gross per 24 hour   Intake 720 ml   Output 750 ml   Net -30 ml       Physical Exam Performed:    BP (!) 163/63   Pulse 61   Temp 98.6 °F (37 °C) (Oral)   Resp 13   Ht 5' 3\" (1.6 m)   Wt 116 lb 13.5 oz (53 kg)   SpO2 100%   BMI 20.70 kg/m²     General appearance: No apparent distress, appears stated age and cooperative. HEENT: Pupils equal, round, and reactive to light. Conjunctivae/corneas clear. Neck: Supple, with full range of motion. No jugular venous distention. Trachea midline. Respiratory:  Normal respiratory effort. Clear to auscultation, bilaterally without Rales/Wheezes/Rhonchi.   Cardiovascular: Regular rate and rhythm with normal S1/S2 without murmurs, rubs or gallops. Abdomen: Soft, non-tender, non-distended with normal bowel sounds. Musculoskeletal: No clubbing, cyanosis or edema bilaterally. Full range of motion without deformity. Skin: Skin color, texture, turgor normal.  No rashes or lesions. Neurologic:  Neurovascularly intact without any focal sensory/motor deficits. Cranial nerves: II-XII intact, grossly non-focal.  Psychiatric: Alert and oriented, thought content appropriate, normal insight  Capillary Refill: Brisk, 3 seconds, normal   Peripheral Pulses: +2 palpable, equal bilaterally       Labs:   Recent Labs     02/26/23 1930 02/28/23  0338   WBC 9.3 4.3   HGB 10.1* 8.5*   HCT 33.0* 27.4*    143     Recent Labs     02/26/23 1930 02/27/23  0710 02/28/23  0338   * 139 136   K 3.6 3.9 5.6*    106 108   CO2 19* 23 18*   BUN 13 13 13   CREATININE 0.9 0.9 1.6*   CALCIUM 8.5 8.9 8.5     Recent Labs     02/26/23 1930   AST 39*   ALT 40   BILITOT 0.3   ALKPHOS 162*     No results for input(s): INR in the last 72 hours. No results for input(s): [place] in the last 72 hours. Urinalysis:      Lab Results   Component Value Date/Time    NITRU Negative 12/10/2022 01:42 PM    WBCUA 3-5 12/10/2022 01:42 PM    BACTERIA 2+ 12/10/2022 01:42 PM    RBCUA 5-10 12/10/2022 01:42 PM    BLOODU MODERATE 12/10/2022 01:42 PM    SPECGRAV >=1.030 12/10/2022 01:42 PM    GLUCOSEU >=1000 12/10/2022 01:42 PM    GLUCOSEU >=1000 mg/dL 06/07/2010 03:38 PM       Radiology:  CT HEAD WO CONTRAST   Final Result   No acute intracranial abnormality. Mild cerebral white matter chronic microvascular ischemic disease. Chronic right maxillary sinus opacification in setting of chronic sinusitis.              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 Patient was apparently found unresponsive on the toilet while doing her bowel prep for a colonoscopy procedure scheduled tomorrow with [facility]. She also supposed to get an EGD. Patient reports she remembers getting diaphoretic and weak just prior to using the bathroom. And after she sat on the toilet she became very lightheaded and lost her consciousness. She reports she takes 22 units of Lantus in the morning which she did. Was instructed not to eat anything so she drank some water and Gatorade in the morning. She also proceeded to take 8 units of the short acting in the morning. Patient reports she is not aware that she is not supposed to take the short acting insulin with a meal if she was not going to eat anything for the meal.  She has been on insulin and a diabetic for about 10 years. EMS was called, and they reported her blood sugar was 40 at the scene. \"        Hypoglycemia, with acute metabolic encephalopathy present on admission. Required D10 gtt, then weaned off.     DM2. SSI for now. F/u F0O.    SHARRON, complicated by acidosis and hyperkalemia. Monitor response to IVFs with bicarb. Gave Lokelma. Of note, patient says she had a nephrectomy because of a renal cancer when she was 4years old. Unexplained weight loss, with elevated CEA and abnormal PET-CT at the hepatic flexure of the colon. This is why she had the outpatient endoscopy scheduled.   Per GI regarding the colonoscopy: \"narrowing at 70 cm (proximal transverse colon or hepatic flexure), too narrow to traverse with the scope but I was able to blindly introduce a biopsy forceps and took some biopsies. Await pathology. Consider surgical resection even if the blind biopsy is not diagnostic. Will keep her for now on daily Miralax. \"    HTN. Lisinopril held due to SHARRON. GERD.  PPI. Hx of breast cancer s/p lumpectomy and radiation     CAD-stable and asymptomatic. DAPT, statin. DVT Prophylaxis: enoxaparin  Diet: ADULT DIET; Regular; 4 carb choices (60 gm/meal); Low Potassium (Less than 3000 mg/day); Low Fiber  Code Status: Full Code  PT/OT Eval Status: rec'd home PT    Dispo - home when her renal function improves. Perhaps 3/1 - 3/2.     Appropriate for A1 Discharge Unit: Amee Garcia MD

## 2023-02-28 NOTE — CARE COORDINATION
[facility]    Referral received from  to follow for home care services. I will follow for needs, and speak with patient to verify demos.     Previously active w [facility] they said no longer taking payor spoke w Alethea Arias RN, BSN CTN  [facility] [phone]

## 2023-02-28 NOTE — PROGRESS NOTES
Physician Progress Note      Raheem Palacios  CSN #:                  [id number]  :                       1960  ADMIT DATE:       2023 7:19 PM  [address] [place] DATE:  Sigifredo Veralety  PROVIDER #:        Lissett Booker MD          QUERY TEXT:    Pt admitted with hypoglycemia. Pt noted to have \"current confusion in ED\" with   H&P charting patient is alert and oriented. If possible, please document in   the progress notes and discharge summary if you are evaluating and / or   treating any of the following: The medical record reflects the following:  Risk Factors: DM 2- BG 40 per EMS, insulin given in fasted state  Clinical Indicators: per ED provider- \"BG 40 at scene per EMS, given D10 with    afterwards. On arrival, . She does not recall events around the   time she was found. Currently, she states she feels confused, shaky, and has   chills. Harden Beech Harden Beech Harden Beech Given current confusion in ED, will do CT head\"  per H&P- \"Severe hypoglycemia With LOC. POC glucose at scene 40 per EMS. Temporarily improved with D10 and p.o. glucose. Then dropped to 41 again in   the ED. iatrogenic due to insulin administration without any p.o. intake of   food.-We will start patient on D10 drip\"  Treatment: Glucose, D10 IV gtt, labs, CT scan, ICU supportive level care    thank you, Daniela Jeffries RN BSN  Options provided:  -- Metabolic encephalopathy d/t hypoglycemia, treated and resolved  -- Other - I will add my own diagnosis  -- Disagree - Not applicable / Not valid  -- Disagree - Clinically unable to determine / Unknown  -- Refer to Clinical Documentation Reviewer    PROVIDER RESPONSE TEXT:    This patient has metabolic encephalopathy d/t hypoglycemia treated and   resolved.     Query created by: Elena Nguyen on 2023 9:05 AM      Electronically signed by:  Lissett Booker MD 2023 2:59 PM

## 2023-03-01 LAB
ANION GAP SERPL CALCULATED.3IONS-SCNC: 10 MMOL/L (ref 3–16)
ANION GAP SERPL CALCULATED.3IONS-SCNC: 9 MMOL/L (ref 3–16)
BUN BLDV-MCNC: 16 MG/DL (ref 7–20)
BUN BLDV-MCNC: 16 MG/DL (ref 7–20)
CALCIUM SERPL-MCNC: 8.6 MG/DL (ref 8.3–10.6)
CALCIUM SERPL-MCNC: 8.7 MG/DL (ref 8.3–10.6)
CHLORIDE BLD-SCNC: 101 MMOL/L (ref 99–110)
CHLORIDE BLD-SCNC: 105 MMOL/L (ref 99–110)
CO2: 24 MMOL/L (ref 21–32)
CO2: 27 MMOL/L (ref 21–32)
CREAT SERPL-MCNC: 1.2 MG/DL (ref 0.6–1.2)
CREAT SERPL-MCNC: 1.3 MG/DL (ref 0.6–1.2)
GFR SERPL CREATININE-BSD FRML MDRD: 46 ML/MIN/{1.73_M2}
GFR SERPL CREATININE-BSD FRML MDRD: 51 ML/MIN/{1.73_M2}
GLUCOSE BLD-MCNC: 183 MG/DL (ref 70–99)
GLUCOSE BLD-MCNC: 194 MG/DL (ref 70–99)
GLUCOSE BLD-MCNC: 223 MG/DL (ref 70–99)
GLUCOSE BLD-MCNC: 225 MG/DL (ref 70–99)
GLUCOSE BLD-MCNC: 315 MG/DL (ref 70–99)
GLUCOSE BLD-MCNC: 346 MG/DL (ref 70–99)
GLUCOSE BLD-MCNC: 347 MG/DL (ref 70–99)
GLUCOSE BLD-MCNC: 434 MG/DL (ref 70–99)
HCT VFR BLD CALC: 27.8 % (ref 36–48)
HEMOGLOBIN: 8.5 G/DL (ref 12–16)
MCH RBC QN AUTO: 27.2 PG (ref 26–34)
MCHC RBC AUTO-ENTMCNC: 30.5 G/DL (ref 31–36)
MCV RBC AUTO: 89.4 FL (ref 80–100)
PDW BLD-RTO: 14 % (ref 12.4–15.4)
PERFORMED ON: ABNORMAL
PLATELET # BLD: 147 K/UL (ref 135–450)
PMV BLD AUTO: 8.9 FL (ref 5–10.5)
POTASSIUM REFLEX MAGNESIUM: 4.7 MMOL/L (ref 3.5–5.1)
POTASSIUM SERPL-SCNC: 4.2 MMOL/L (ref 3.5–5.1)
RBC # BLD: 3.11 M/UL (ref 4–5.2)
SODIUM BLD-SCNC: 138 MMOL/L (ref 136–145)
SODIUM BLD-SCNC: 138 MMOL/L (ref 136–145)
WBC # BLD: 4.7 K/UL (ref 4–11)

## 2023-03-01 PROCEDURE — 36415 COLL VENOUS BLD VENIPUNCTURE: CPT

## 2023-03-01 PROCEDURE — 6360000002 HC RX W HCPCS: Performed by: INTERNAL MEDICINE

## 2023-03-01 PROCEDURE — 6370000000 HC RX 637 (ALT 250 FOR IP): Performed by: INTERNAL MEDICINE

## 2023-03-01 PROCEDURE — 2580000003 HC RX 258: Performed by: INTERNAL MEDICINE

## 2023-03-01 PROCEDURE — 1200000000 HC SEMI PRIVATE

## 2023-03-01 PROCEDURE — 2500000003 HC RX 250 WO HCPCS: Performed by: INTERNAL MEDICINE

## 2023-03-01 PROCEDURE — 97116 GAIT TRAINING THERAPY: CPT

## 2023-03-01 PROCEDURE — 80048 BASIC METABOLIC PNL TOTAL CA: CPT

## 2023-03-01 PROCEDURE — 85027 COMPLETE CBC AUTOMATED: CPT

## 2023-03-01 PROCEDURE — 97110 THERAPEUTIC EXERCISES: CPT

## 2023-03-01 RX ORDER — INSULIN GLARGINE 100 [IU]/ML
16 INJECTION, SOLUTION SUBCUTANEOUS NIGHTLY
Status: DISCONTINUED | OUTPATIENT
Start: 2023-03-01 | End: 2023-03-02

## 2023-03-01 RX ADMIN — INSULIN HUMAN 8 UNITS: 100 INJECTION, SUSPENSION SUBCUTANEOUS at 12:39

## 2023-03-01 RX ADMIN — ATORVASTATIN CALCIUM 20 MG: 10 TABLET, FILM COATED ORAL at 07:59

## 2023-03-01 RX ADMIN — OXYCODONE AND ACETAMINOPHEN 1 TABLET: 7.5; 325 TABLET ORAL at 21:10

## 2023-03-01 RX ADMIN — PANTOPRAZOLE SODIUM 40 MG: 40 TABLET, DELAYED RELEASE ORAL at 08:00

## 2023-03-01 RX ADMIN — INSULIN LISPRO 4 UNITS: 100 INJECTION, SOLUTION INTRAVENOUS; SUBCUTANEOUS at 21:05

## 2023-03-01 RX ADMIN — INSULIN LISPRO 3 UNITS: 100 INJECTION, SOLUTION INTRAVENOUS; SUBCUTANEOUS at 16:36

## 2023-03-01 RX ADMIN — ASPIRIN 81 MG: 81 TABLET, COATED ORAL at 07:59

## 2023-03-01 RX ADMIN — TRAZODONE HYDROCHLORIDE 200 MG: 50 TABLET ORAL at 21:02

## 2023-03-01 RX ADMIN — GABAPENTIN 600 MG: 300 CAPSULE ORAL at 14:31

## 2023-03-01 RX ADMIN — INSULIN LISPRO 4 UNITS: 100 INJECTION, SOLUTION INTRAVENOUS; SUBCUTANEOUS at 12:32

## 2023-03-01 RX ADMIN — GABAPENTIN 600 MG: 300 CAPSULE ORAL at 07:59

## 2023-03-01 RX ADMIN — ENOXAPARIN SODIUM 40 MG: 100 INJECTION SUBCUTANEOUS at 08:00

## 2023-03-01 RX ADMIN — TICAGRELOR 90 MG: 90 TABLET ORAL at 21:03

## 2023-03-01 RX ADMIN — GABAPENTIN 600 MG: 300 CAPSULE ORAL at 21:03

## 2023-03-01 RX ADMIN — Medication 1 TABLET: at 08:00

## 2023-03-01 RX ADMIN — INSULIN GLARGINE 16 UNITS: 100 INJECTION, SOLUTION SUBCUTANEOUS at 21:05

## 2023-03-01 RX ADMIN — SODIUM CHLORIDE, PRESERVATIVE FREE 10 ML: 5 INJECTION INTRAVENOUS at 08:01

## 2023-03-01 RX ADMIN — PALIPERIDONE 9 MG: 3 TABLET, EXTENDED RELEASE ORAL at 08:03

## 2023-03-01 RX ADMIN — SODIUM CHLORIDE, PRESERVATIVE FREE 10 ML: 5 INJECTION INTRAVENOUS at 21:19

## 2023-03-01 RX ADMIN — CLONAZEPAM 0.5 MG: 0.5 TABLET ORAL at 14:31

## 2023-03-01 RX ADMIN — TICAGRELOR 90 MG: 90 TABLET ORAL at 07:59

## 2023-03-01 RX ADMIN — MUPIROCIN: 20 OINTMENT TOPICAL at 08:01

## 2023-03-01 RX ADMIN — SODIUM BICARBONATE: 84 INJECTION, SOLUTION INTRAVENOUS at 02:04

## 2023-03-01 ASSESSMENT — PAIN SCALES - GENERAL
PAINLEVEL_OUTOF10: 0
PAINLEVEL_OUTOF10: 8
PAINLEVEL_OUTOF10: 0
PAINLEVEL_OUTOF10: 10
PAINLEVEL_OUTOF10: 0

## 2023-03-01 ASSESSMENT — PAIN DESCRIPTION - LOCATION
LOCATION: BACK;ABDOMEN
LOCATION: BACK

## 2023-03-01 NOTE — PROGRESS NOTES
Called [facility] pharmacy to verify med list, pt doesn't know what meds she takes. She gets her klonopin from [facility] ([address] A lot of the meds that were on her home medication list haven't been filled since July 2022.

## 2023-03-01 NOTE — PROGRESS NOTES
4 Eyes Skin Assessment     The patient is being assess for   Transfer to New Unit    I agree that 2 RN's have performed a thorough Head to Toe Skin Assessment on the patient. ALL assessment sites listed below have been assessed. Areas assessed for pressure by both nurses:   [x]   Head, Face, and Ears   [x]   Shoulders, Back, and Chest, Abdomen  [x]   Arms, Elbows, and Hands   [x]   Coccyx, Sacrum, and Ischium  [x]   Legs, Feet, and Heels        Skin Assessed Under all Medical Devices by both nurses:  none               All Mepilex Borders were peeled back and area peeked at by both nurses:  Yes  Please list where Mepilex Borders are located:  n/a             **SHARE this note so that the co-signing nurse is able to place an eSignature**    Co-signer eSignature: Electronically signed by Deshawn Roe RN on 3/1/23 at 10:32 AM EST    Does the Patient have Skin Breakdown related to pressure?   No              Maco Prevention initiated:  NA   Wound Care Orders initiated:  NA      [facility] nurse consulted for Pressure Injury (Stage 3,4, Unstageable, DTI, NWPT, Complex wounds)and New or Established Ostomies:  NA      Primary Nurse eSignature: Electronically signed by Pamella Bocanegra RN on 3/1/23 at 10:31 AM EST

## 2023-03-01 NOTE — PROGRESS NOTES
Patient admitted to room 361 from 231. Patient oriented to room, call light, bed rails, phone, lights and bathroom. Patient instructed about the schedule of the day including: vital sign frequency, lab draws, possible tests, frequency of MD and staff rounds, including RN/MD rounding together at bedside, daily weights, and I &O's. Patient instructed about prescribed diet, how to use 8MENU, and television. Bed alarm in place, patient aware of placement and reason. Bed locked, in lowest position, side rails up 2/4, call light within reach. Will continue to monitor.

## 2023-03-01 NOTE — PROGRESS NOTES
Hospitalist Progress Note      PCP: Obedus Stewart    Date of Admission: 2/26/2023    Chief Complaint:   Hypoglycemia     Subjective:    She is more alert and energetic today. Tolerating PO. No shortness of breath. No chest pain      Medications:  Reviewed    Infusion Medications    dextrose      sodium chloride       Scheduled Medications    insulin lispro  0-4 Units SubCUTAneous TID     insulin lispro  0-4 Units SubCUTAneous Nightly    polyethylene glycol  17 g Oral Daily    ticagrelor  90 mg Oral BID    aspirin  81 mg Oral Daily    atorvastatin  20 mg Oral Daily    calcium carb-cholecalciferol  1 tablet Oral Daily    gabapentin  600 mg Oral TID    [Held by provider] lisinopril  40 mg Oral Daily    paliperidone  9 mg Oral QAM    pantoprazole  40 mg Oral Daily    traZODone  200 mg Oral Nightly    sodium chloride flush  5-40 mL IntraVENous 2 times per day    enoxaparin  40 mg SubCUTAneous Daily     PRN Meds: hydrALAZINE, oxyCODONE-acetaminophen, glucose, dextrose bolus **OR** dextrose bolus, glucagon (rDNA), dextrose, clonazePAM, sodium chloride flush, sodium chloride, ondansetron **OR** ondansetron, polyethylene glycol, acetaminophen **OR** acetaminophen      Intake/Output Summary (Last 24 hours) at 3/1/2023 1205  Last data filed at 2/28/2023 2000  Gross per 24 hour   Intake 480 ml   Output --   Net 480 ml         Physical Exam Performed:    BP (!) 172/73   Pulse 62   Temp 98 °F (36.7 °C) (Oral)   Resp 18   Ht 5' 3\" (1.6 m)   Wt 120 lb 8 oz (54.7 kg)   SpO2 100%   BMI 21.35 kg/m²     General appearance: No apparent distress, appears stated age and cooperative. HEENT: Pupils equal, round, and reactive to light. Conjunctivae/corneas clear. Neck: Supple, with full range of motion. No jugular venous distention. Trachea midline. Respiratory:  Normal respiratory effort. Clear to auscultation, bilaterally without Rales/Wheezes/Rhonchi.   Cardiovascular: Regular rate and rhythm with normal S1/S2 without murmurs, rubs or gallops. Abdomen: Soft, non-tender, non-distended with normal bowel sounds. Musculoskeletal: No clubbing, cyanosis or edema bilaterally. Full range of motion without deformity. Skin: Skin color, texture, turgor normal.  No rashes or lesions. Neurologic:  Neurovascularly intact without any focal sensory/motor deficits. Cranial nerves: II-XII intact, grossly non-focal.  Psychiatric: Alert and oriented x3. Capillary Refill: Brisk, 3 seconds, normal   Peripheral Pulses: +2 palpable, equal bilaterally       Labs:   Recent Labs     02/26/23 1930 02/28/23 0338 03/01/23 0428   WBC 9.3 4.3 4.7   HGB 10.1* 8.5* 8.5*   HCT 33.0* 27.4* 27.8*    143 147       Recent Labs     02/27/23  0710 02/28/23 0338 03/01/23 0428    136 138   K 3.9 5.6* 4.7    108 105   CO2 23 18* 24   BUN 13 13 16   CREATININE 0.9 1.6* 1.2   CALCIUM 8.9 8.5 8.6       Recent Labs     02/26/23 1930   AST 39*   ALT 40   BILITOT 0.3   ALKPHOS 162*       No results for input(s): INR in the last 72 hours. No results for input(s): [place] in the last 72 hours. Urinalysis:      Lab Results   Component Value Date/Time    NITRU Negative 12/10/2022 01:42 PM    WBCUA 3-5 12/10/2022 01:42 PM    BACTERIA 2+ 12/10/2022 01:42 PM    RBCUA 5-10 12/10/2022 01:42 PM    BLOODU MODERATE 12/10/2022 01:42 PM    SPECGRAV >=1.030 12/10/2022 01:42 PM    GLUCOSEU >=1000 12/10/2022 01:42 PM    GLUCOSEU >=1000 mg/dL 06/07/2010 03:38 PM       Radiology:  [address]   Final Result   Left kidney is unremarkable         CT HEAD WO CONTRAST   Final Result   No acute intracranial abnormality. Mild cerebral white matter chronic microvascular ischemic disease. Chronic right maxillary sinus opacification in setting of chronic sinusitis.              IP CONSULT TO GI    Assessment/Plan:    Active Hospital Problems    Diagnosis     Hypoglycemia due to insulin [E16.0, T38.3X5A]      Priority: Medium 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 (HCC) [E11.59]     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 stage 3 chronic kidney disease (HCC) [I13.0, I50.22, N18.30]     Hx of ischemic CVA [I63.40]     Bilateral malignant neoplasm of breast in female ([facility] [C50.911, C50.912]     Tobacco use disorder [F17.200]          \"Patient with PMH of DM 2, HTN, CAD s/p PCI, bipolar disorder presented to the ED today for unresponsive state. Patient was apparently found unresponsive on the toilet while doing her bowel prep for a colonoscopy procedure scheduled tomorrow with [facility] She also supposed to get an EGD. Patient reports she remembers getting diaphoretic and weak just prior to using the bathroom. And after she sat on the toilet she became very lightheaded and lost her consciousness. She reports she takes 22 units of Lantus in the morning which she did. Was instructed not to eat anything so she drank some water and Gatorade in the morning. She also proceeded to take 8 units of the short acting in the morning. Patient reports she is not aware that she is not supposed to take the short acting insulin with a meal if she was not going to eat anything for the meal.  She has been on insulin and a diabetic for about 10 years. EMS was called, and they reported her blood sugar was 40 at the scene. \"      1. Hypoglycemia due to insulin use with decreased p.o. intake. Blood sugars have stabilized. We will continue to monitor. Patient on insulin sliding scale. We will reinstitute basal bolus regimen. 2.  SHARRON with acidosis. Patient was on a bicarb drip. We will discontinue drip and recheck BMP in afternoon. 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 3.  Weight loss. Patient with abnormal CEA. Patient will follow with GI for abnormal C-scope. 4.  Hypertension. Lisinopril on hold due to SHARRON. Blood pressure elevated.     Patient with 1 or more chronic illnesses with severe exacerbation or side effects of treatment and/or 1 acute or chronic illness that poses threat to life or bodily function. Decision regarding hospitalization or escalation  Patient on drug therapy that requires intensive monitoring. DVT Prophylaxis: Lovenox  Diet: ADULT DIET; Regular; 4 carb choices (60 gm/meal); Low Potassium (Less than 3000 mg/day); Low Fiber  ADULT ORAL NUTRITION SUPPLEMENT; Breakfast, Lunch, Dinner; Standard High Calorie/High Protein Oral Supplement  Code Status: Full Code  PT/OT Eval Status: patient ambulatory at home    [address] [place] [address] when renal function improves.     Appropriate for A1 Discharge Unit: Yes      Fran Estrada MD

## 2023-03-01 NOTE — PROGRESS NOTES
PROGRESS NOTE    HPI: Mindy Valdes is a(n)62 y.o. female admitted for work-up and treatment for Confusion [R41.0]  Hypoglycemia [E16.2]  Hypoglycemia due to insulin [E16.0, T38.3X5A]. We are following for abnormal colonoscopy findings. Subjective:     Complains of abdominal pain. She is tolerating a low fiber diet. Says she is eating a lot. Had a solid BM yesterday. Objective:     I/O last 3 completed shifts: In: 960 [P.O.:960]  Out: 750 [Urine:750]      /63   Pulse 52   Temp 98.5 °F (36.9 °C) (Oral)   Resp 14   Ht 5' 3\" (1.6 m)   Wt 120 lb 8 oz (54.7 kg)   SpO2 99%   BMI 21.35 kg/m²     Physical Exam:  HEENT: anicteric sclera, oropharyngeal membranes pink and moist.  Cor: RRR  Lungs: non-labored, no respiratory distress  Abdomen: distended but soft, non tympanic, + mid abdominal tenderness. Extremities: no edema  Neuro: alert and oriented x 3,      Results:   Lab Results   Component Value Date    ALT 40 02/26/2023    AST 39 (H) 02/26/2023     (H) 06/03/2021    ALKPHOS 162 (H) 02/26/2023    BILIDIR <0.2 02/10/2023    PROT 7.1 02/26/2023    LABALBU 3.3 (L) 02/26/2023    INR 0.97 12/14/2022    LIPASE 5.0 (L) 02/10/2023     Lab Results   Component Value Date    WBC 4.7 03/01/2023    HGB 8.5 (L) 03/01/2023    HCT 27.8 (L) 03/01/2023    MCV 89.4 03/01/2023     03/01/2023     BUN/Cr/glu/ALT/AST/amyl/lip:  16/1.2/--/--/--/--/-- (03/01 0428)  CT HEAD WO CONTRAST    Result Date: 2/26/2023  No acute intracranial abnormality. Mild cerebral white matter chronic microvascular ischemic disease. Chronic right maxillary sinus opacification in setting of chronic sinusitis. US RETROPERITONEAL COMPLETE    Result Date: 2/28/2023  Left kidney is unremarkable         Impression:  60year old female with diarrhea, anemia, weight loss and abnormal PET scan of the colon, elevated CEA.   Admitted with hypoglycemia following bowel prep for outpatient colonoscopy. Underwent inpatient scope yesterday with findings of narrowing at around the proximal transverse colon or hepatic flexure. Unable to traverse with scope and poor prep prevented optimal evaluation. Her biopsies are pending. Plan:  Follow-up pathology. Will need surgical evaluation of she has signs of obstruction despite path results. 3. Continue daily miralax. 4. Low fiber diet. 5. AXR in AM.    Please do not hesitate to call with questions or concerns.       Electronically signed by: SARTHAK Rock 3/1/2023 7:19 AM

## 2023-03-01 NOTE — PLAN OF CARE
Problem: Discharge Planning  Goal: Discharge to home or other facility with appropriate resources  2/28/2023 2158 by Ridge Hernandez RN  Outcome: Progressing  2/28/2023 1650 by Eliza Castillo RN  Outcome: Progressing  Flowsheets (Taken 2/28/2023 0800)  Discharge to home or other facility with appropriate resources:   Identify barriers to discharge with patient and caregiver   Arrange for needed discharge resources and transportation as appropriate   Identify discharge learning needs (meds, wound care, etc)     Problem: Pain  Goal: Verbalizes/displays adequate comfort level or baseline comfort level  2/28/2023 2158 by Ridge Hernandez RN  Outcome: Progressing  2/28/2023 1650 by Eliza Castillo RN  Outcome: Progressing     Problem: Chronic Conditions and Co-morbidities  Goal: Patient's chronic conditions and co-morbidity symptoms are monitored and maintained or improved  2/28/2023 2158 by Ridge Hernandez RN  Outcome: Progressing  Flowsheets (Taken 2/28/2023 2000)  Care Plan - Patient's Chronic Conditions and Co-Morbidity Symptoms are Monitored and Maintained or Improved:   Monitor and assess patient's chronic conditions and comorbid symptoms for stability, deterioration, or improvement   Collaborate with multidisciplinary team to address chronic and comorbid conditions and prevent exacerbation or deterioration   Update acute care plan with appropriate goals if chronic or comorbid symptoms are exacerbated and prevent overall improvement and discharge  2/28/2023 1650 by Eliza Castillo RN  Outcome: Progressing  Flowsheets (Taken 2/28/2023 0800)  Care Plan - Patient's Chronic Conditions and Co-Morbidity Symptoms are Monitored and Maintained or Improved:   Monitor and assess patient's chronic conditions and comorbid symptoms for stability, deterioration, or improvement   Collaborate with multidisciplinary team to address chronic and comorbid conditions and prevent exacerbation or deterioration     Problem: Gastrointestinal - Adult  Goal: Minimal or absence of nausea and vomiting  2/28/2023 2158 by Leslie Baldwin RN  Outcome: Progressing  2/28/2023 1650 by Yoselyn Byers RN  Outcome: Progressing  Flowsheets (Taken 2/28/2023 1600)  Minimal or absence of nausea and vomiting:   Administer ordered antiemetic medications as needed   Advance diet as tolerated, if ordered   Administer IV fluids as ordered to ensure adequate hydration  Goal: Maintains or returns to baseline bowel function  2/28/2023 2158 by Leslie Baldwin RN  Outcome: Progressing  2/28/2023 1650 by Yoselyn Byers RN  Outcome: Progressing  Flowsheets (Taken 2/28/2023 1600)  Maintains or returns to baseline bowel function:   Assess bowel function   Encourage oral fluids to ensure adequate hydration   Administer IV fluids as ordered to ensure adequate hydration  Goal: Maintains adequate nutritional intake  2/28/2023 2158 by Leslie Baldwin RN  Outcome: Progressing  2/28/2023 1650 by Yoselyn Byers RN  Outcome: Progressing  Flowsheets (Taken 2/28/2023 1600)  Maintains adequate nutritional intake:   Monitor percentage of each meal consumed   Identify factors contributing to decreased intake, treat as appropriate     Problem: Metabolic/Fluid and Electrolytes - Adult  Goal: Glucose maintained within prescribed range  2/28/2023 2158 by Leslie Baldwin RN  Outcome: Progressing  Flowsheets (Taken 2/28/2023 2000)  Glucose maintained within prescribed range:   Monitor blood glucose as ordered   Assess for signs and symptoms of hyperglycemia and hypoglycemia   Administer ordered medications to maintain glucose within target range   Assess barriers to adequate nutritional intake and initiate nutrition consult as needed   Instruct patient on self management of diabetes and initiate consult as needed  2/28/2023 1650 by Yoselyn Byers RN  Outcome: Progressing  Flowsheets (Taken 2/28/2023 0800)  Glucose maintained within prescribed range:   Monitor blood glucose as ordered   Assess for signs and symptoms of hyperglycemia and hypoglycemia  Goal: Electrolytes maintained within normal limits  2/28/2023 2158 by Douglas Carter RN  Outcome: Progressing  Flowsheets (Taken 2/28/2023 2000)  Electrolytes maintained within normal limits:   Monitor labs and assess patient for signs and symptoms of electrolyte imbalances   Administer electrolyte replacement as ordered   Monitor response to electrolyte replacements, including repeat lab results as appropriate  2/28/2023 1650 by Olene Lanes, RN  Outcome: Progressing     Problem: Safety - Adult  Goal: Free from fall injury  Outcome: Progressing

## 2023-03-01 NOTE — PROGRESS NOTES
Physical Therapy  Facility/Department: [facility] B3 - MED SURG  Daily Treatment Note/Discharge Summary  NAME: Burgess Jones  : 1960  MRN: [id number]    Date of Service: 3/1/2023    Discharge Recommendations:  Home with Home health PT, 24 hour supervision or assist   PT Equipment Recommendations  Equipment Needed: No  Other: Pt owns SPC and RW    Patient Diagnosis(es): The primary encounter diagnosis was Hypoglycemia. Diagnoses of Confusion and Screening for colon cancer were also pertinent to this visit. Assessment   Assessment: Pt seen for PT treatment follow up this date. Pt alert and energetic this date. Pt completed bed mobility with supervision, STS transfers without AD and supervision and ambulated 300ft continuously with SBA. Pt did demo a few bouts of instability with ambulation, but able to self-correct just requiring CGA. Pt successfully negotiated 12 steps with R handrail and a reciprocal gait pattern and with SBA. No LOB/instability observed with stair negotiation. Pt reports that she does own a SPC and RW that she uses PRN when her balance is feeling off at home and stated that she would use SPC initally when home. Pt appears to be functioning at her baseline and no longer needs skilled therapy in the acute setting. Recommend home with 24hr assist for home and step negotiation and HHPT upon d/c.   Activity Tolerance: Patient tolerated treatment well  Equipment Needed: No  Other: Pt owns SPC and RW     Plan    Physcial Therapy Plan  General Plan: Discharge     Restrictions  Restrictions/Precautions  Restrictions/Precautions: Fall Risk, General Precautions  Position Activity Restriction  Other position/activity restrictions: telemetry     Subjective    Subjective  Subjective: Arrived and pt supine in bed, alert and agreed to participate in therapy  Pain: Pt denies pain     Objective   Vitals   Sitting: BP: 172/73, HR: 62, SpO2: 100%    Bed Mobility Training  Bed Mobility Training: Yes  Overall Level of Assistance: Supervision  Supine to Sit: Supervision (HOB elevated, use of bedrails)  Sit to Supine: Supervision (HOB elevated)  Scooting: Supervision (towards EOB)  Balance  Sitting: Intact  Standing: Impaired  Standing - Static: Good (SBA with no AD)  Standing - Dynamic: Fair (CGA mostly SBA with ambulation without AD)  Transfer Training  Transfer Training: Yes  Overall Level of Assistance: Supervision  Sit to Stand: Supervision  Stand to Sit: Supervision  Gait Training  Gait Training: Yes  Gait  Overall Level of Assistance: Additional time;Contact-guard assistance;Stand-by assistance (CGA mostly SBA with no AD)  Interventions: Verbal cues; Safety awareness training  Base of Support: Widened  Speed/Olga: Slow;Shuffled  Step Length: Right shortened;Left shortened  Gait Abnormalities: Shuffling gait; Decreased step clearance; Path deviations  Few bouts of instability with ambulation, pt able to self-correct requiring CGA  Distance (ft): 300 Feet  Assistive Device: Gait belt (no AD)  Rail Use: Right  Stairs - Level of Assistance: Stand-by assistance (with no AD, reciprocal pattern ascending/descending, no LOB/instability)  Number of Stairs Trained: 12     PT Exercises  Exercise Treatment: Pt performed standing ankle DF & PF, hip abduction, hip extension, marches, knee flexion with BUE support of bedrails x15 reps BLE     Safety Devices  Type of Devices: Gait belt;Nurse notified; Left in bed;Bed alarm in place;Call light within reach; Patient at risk for falls; All fall risk precautions in place  Restraints  Restraints Initially in Place: No     Goals  Short Term Goals  Time Frame for Short Term Goals: 7 days (3/7/23)  Short Term Goal 1: Pt will perform bed mobility with supervision-- 3/1 GOAL MET  Short Term Goal 2: Pt will perform transfers with no AD and supervision-- 3/1 GOAL MET  Short Term Goal 3: Pt will ambulate 200ft with no AD and SBA-- 3/1 300ft CGA/SBA no AD  Short Term Goal 4: Pt will negotiate 12 steps with R handrail and SBA-- 3/1 GOAL MET  Short Term Goal 5: Pt will perform 12-15 reps BLE exercises by 3/3/23--3/1 GOAL MET  Patient Goals   Patient Goals : \"to go home\"    Education  Patient Education  Education Given To: Patient  Education Provided: Role of Therapy;Plan of Care;Home Exercise Program;Transfer Training  Education Method: Verbal  Barriers to Learning: None  Education Outcome: Verbalized understanding;Demonstrated understanding    Therapy Time   Individual Concurrent Group Co-treatment   Time In 1057         Time Out 1127         Minutes 30         Timed Code Treatment Minutes: [address] Lauri,SPT

## 2023-03-02 ENCOUNTER — APPOINTMENT (OUTPATIENT)
Dept: CT IMAGING | Age: 63
DRG: 329 | End: 2023-03-02
Payer: COMMERCIAL

## 2023-03-02 ENCOUNTER — APPOINTMENT (OUTPATIENT)
Dept: GENERAL RADIOLOGY | Age: 63
DRG: 329 | End: 2023-03-02
Payer: COMMERCIAL

## 2023-03-02 LAB
GLUCOSE BLD-MCNC: 118 MG/DL (ref 70–99)
GLUCOSE BLD-MCNC: 194 MG/DL (ref 70–99)
GLUCOSE BLD-MCNC: 262 MG/DL (ref 70–99)
GLUCOSE BLD-MCNC: 400 MG/DL (ref 70–99)
GLUCOSE BLD-MCNC: 416 MG/DL (ref 70–99)
GLUCOSE BLD-MCNC: 481 MG/DL (ref 70–99)
GLUCOSE BLD-MCNC: 52 MG/DL (ref 70–99)
GLUCOSE BLD-MCNC: 73 MG/DL (ref 70–99)
GLUCOSE BLD-MCNC: 75 MG/DL (ref 70–99)
PERFORMED ON: ABNORMAL
PERFORMED ON: NORMAL
PERFORMED ON: NORMAL

## 2023-03-02 PROCEDURE — 6370000000 HC RX 637 (ALT 250 FOR IP): Performed by: INTERNAL MEDICINE

## 2023-03-02 PROCEDURE — 6370000000 HC RX 637 (ALT 250 FOR IP): Performed by: NURSE PRACTITIONER

## 2023-03-02 PROCEDURE — 74176 CT ABD & PELVIS W/O CONTRAST: CPT

## 2023-03-02 PROCEDURE — 2580000003 HC RX 258: Performed by: INTERNAL MEDICINE

## 2023-03-02 PROCEDURE — 1200000000 HC SEMI PRIVATE

## 2023-03-02 PROCEDURE — 74018 RADEX ABDOMEN 1 VIEW: CPT

## 2023-03-02 PROCEDURE — 6360000002 HC RX W HCPCS: Performed by: INTERNAL MEDICINE

## 2023-03-02 RX ORDER — INSULIN GLARGINE 100 [IU]/ML
8 INJECTION, SOLUTION SUBCUTANEOUS NIGHTLY
Status: DISCONTINUED | OUTPATIENT
Start: 2023-03-02 | End: 2023-03-03

## 2023-03-02 RX ADMIN — TRAZODONE HYDROCHLORIDE 200 MG: 50 TABLET ORAL at 20:55

## 2023-03-02 RX ADMIN — CLONAZEPAM 0.5 MG: 0.5 TABLET ORAL at 17:13

## 2023-03-02 RX ADMIN — GABAPENTIN 600 MG: 300 CAPSULE ORAL at 20:55

## 2023-03-02 RX ADMIN — ENOXAPARIN SODIUM 40 MG: 100 INJECTION SUBCUTANEOUS at 08:39

## 2023-03-02 RX ADMIN — INSULIN LISPRO 4 UNITS: 100 INJECTION, SOLUTION INTRAVENOUS; SUBCUTANEOUS at 17:05

## 2023-03-02 RX ADMIN — GABAPENTIN 600 MG: 300 CAPSULE ORAL at 08:39

## 2023-03-02 RX ADMIN — POLYETHYLENE GLYCOL 3350, SODIUM SULFATE ANHYDROUS, SODIUM BICARBONATE, SODIUM CHLORIDE, POTASSIUM CHLORIDE 4000 ML: 236; 22.74; 6.74; 5.86; 2.97 POWDER, FOR SOLUTION ORAL at 17:04

## 2023-03-02 RX ADMIN — TICAGRELOR 90 MG: 90 TABLET ORAL at 20:55

## 2023-03-02 RX ADMIN — INSULIN GLARGINE 8 UNITS: 100 INJECTION, SOLUTION SUBCUTANEOUS at 20:59

## 2023-03-02 RX ADMIN — ATORVASTATIN CALCIUM 20 MG: 10 TABLET, FILM COATED ORAL at 08:39

## 2023-03-02 RX ADMIN — PALIPERIDONE 9 MG: 3 TABLET, EXTENDED RELEASE ORAL at 10:53

## 2023-03-02 RX ADMIN — PANTOPRAZOLE SODIUM 40 MG: 40 TABLET, DELAYED RELEASE ORAL at 08:39

## 2023-03-02 RX ADMIN — OXYCODONE AND ACETAMINOPHEN 1 TABLET: 7.5; 325 TABLET ORAL at 20:54

## 2023-03-02 RX ADMIN — INSULIN LISPRO 2 UNITS: 100 INJECTION, SOLUTION INTRAVENOUS; SUBCUTANEOUS at 12:00

## 2023-03-02 RX ADMIN — Medication 1 TABLET: at 08:40

## 2023-03-02 RX ADMIN — TICAGRELOR 90 MG: 90 TABLET ORAL at 08:39

## 2023-03-02 RX ADMIN — ACETAMINOPHEN 325MG 650 MG: 325 TABLET ORAL at 12:43

## 2023-03-02 RX ADMIN — GABAPENTIN 600 MG: 300 CAPSULE ORAL at 14:03

## 2023-03-02 RX ADMIN — OXYCODONE AND ACETAMINOPHEN 1 TABLET: 7.5; 325 TABLET ORAL at 06:49

## 2023-03-02 RX ADMIN — ASPIRIN 81 MG: 81 TABLET, COATED ORAL at 08:39

## 2023-03-02 RX ADMIN — ACETAMINOPHEN 325MG 650 MG: 325 TABLET ORAL at 04:18

## 2023-03-02 RX ADMIN — SODIUM CHLORIDE, PRESERVATIVE FREE 10 ML: 5 INJECTION INTRAVENOUS at 21:41

## 2023-03-02 RX ADMIN — INSULIN LISPRO 4 UNITS: 100 INJECTION, SOLUTION INTRAVENOUS; SUBCUTANEOUS at 20:59

## 2023-03-02 RX ADMIN — SODIUM CHLORIDE, PRESERVATIVE FREE 10 ML: 5 INJECTION INTRAVENOUS at 08:41

## 2023-03-02 ASSESSMENT — PAIN DESCRIPTION - LOCATION
LOCATION: BACK
LOCATION: BACK
LOCATION: ABDOMEN
LOCATION: HEAD
LOCATION: BACK

## 2023-03-02 ASSESSMENT — PAIN SCALES - GENERAL
PAINLEVEL_OUTOF10: 7
PAINLEVEL_OUTOF10: 9
PAINLEVEL_OUTOF10: 6
PAINLEVEL_OUTOF10: 7
PAINLEVEL_OUTOF10: 8

## 2023-03-02 NOTE — PLAN OF CARE
4 Eyes Skin Assessment     NAME:  Burgess Jones  YOB: 1960  MEDICAL RECORD NUMBER:  [id number]    The patient is being assessed for  Transfer to New Unit    I agree that One RN has performed a thorough Head to Toe Skin Assessment on the patient. ALL assessment sites listed below have been assessed. Areas assessed by both nurses:    Head, Face, Ears, Shoulders, Back, Chest, Arms, Elbows, Hands, Sacrum. Buttock, Coccyx, Ischium, and Legs. Feet and Heels        Does the Patient have a Wound?  No noted wound(s)       Maco Prevention initiated by RN: NA   Wound Care Orders initiated by RN: NA    Pressure Injury (Stage 3,4, Unstageable, DTI, NWPT, and Complex wounds) if present, place referral order by RN under : NA    New and Established Ostomies, if present place, referral order under : NA      Nurse 1 eSignature: Electronically signed by Kameron Galeas RN on 3/2/23 at 2:41 PM EST    **SHARE this note so that the co-signing nurse can place an eSignature**    Nurse 2 eSignature: Electronically signed by Melisa Barillas RN on 3/2/23 at 2:43 PM EST

## 2023-03-02 NOTE — PLAN OF CARE
Problem: Chronic Conditions and Co-morbidities  Goal: Patient's chronic conditions and co-morbidity symptoms are monitored and maintained or improved  Outcome: Progressing   The patient will demonstrate an understanding of s/s & treatment of hyperglycemia, by verbalization,  with the goal of completion at discharge.

## 2023-03-02 NOTE — PROGRESS NOTES
Comprehensive Nutrition Assessment    Type and Reason for Visit:  Initial, Positive Nutrition Screen    Nutrition Recommendations/Plan:   Continue CC4, low potassium diet, low fiber diet and encourage PO intake   Modify ONS to strawberry glucerna  Monitor nutrition adequacy, pertinent labs, bowel habits, wt changes, and clinical progress     Malnutrition Assessment:  Malnutrition Status: At risk for malnutrition (Comment) (03/02/23 3701)    Context:  Acute Illness     Findings of the 6 clinical characteristics of malnutrition:  Energy Intake:  Mild decrease in energy intake (Comment)  Muscle Mass Loss: Moderate muscle mass loss Clavicles (pectoralis & deltoids), Temples (temporalis)    Nutrition Assessment:    Positive nutrition screen for wt loss and poor appetite/intake: 60 y.o f admitted w/ diarrhea, anemia, wt loss admitted w/ hypoglycemia after bowel prep for outpatient colonoscopy. S/p scope yesterday, narrowing around proximal transverse colon or hepatic flexure. Biopsies pending. CT imaging, large amount of stool in colon. Pt currently on CC4, low potassium, low fiber diet w/ PO intakes of % of meals. Pt reports good appetite eating most meals. Endorses 100 lb wt loss over the past year, no significant wt loss noted in EMR. Pt likes strawberry ensure, will modify to glucerna d/t elevated BG. Provided carb control handout at [facility]. Continue to encourage PO intake, will continue to monitor. Nutrition Related Findings:    + BM today. -434 x 24 hours. Wound Type: None       Current Nutrition Intake & Therapies:    Average Meal Intake: %  Average Supplements Intake: %  ADULT DIET; Regular; 4 carb choices (60 gm/meal); Low Potassium (Less than 3000 mg/day);  Low Fiber  ADULT ORAL NUTRITION SUPPLEMENT; Breakfast, Lunch, Dinner; Standard High Calorie/High Protein Oral Supplement    Anthropometric Measures:  Height: 5' 3\" (160 cm)  Ideal Body Weight (IBW): 115 lbs (52 kg)       Current Body Weight: 121 lb (54.9 kg), 105.2 % IBW. Weight Source: Standing Scale  Current BMI (kg/m2): 21.4                    BMI Categories: Normal Weight (BMI 18.5-24. 9)    Estimated Daily Nutrient Needs:  Energy Requirements Based On: Kcal/kg (25-30 kcals/kg)  Weight Used for Energy Requirements: Current (55 kg)  Energy (kcal/day): 2122-1992  Weight Used for Protein Requirements: Current (1-1.2 g/kg)  Protein (g/day): 55-66 g  Method Used for Fluid Requirements: 1 ml/kcal    Nutrition Diagnosis:   Increased nutrient needs related to increase demand for energy/nutrients as evidenced by poor intake prior to admission, moderate muscle loss    Nutrition Interventions:   Food and/or Nutrient Delivery: Continue Current Diet, Modify Oral Nutrition Supplement  Nutrition Education/Counseling: Education initiated  Coordination of Nutrition Care: Continue to monitor while inpatient       Goals:     Goals: PO intake 50% or greater, prior to discharge       Nutrition Monitoring and Evaluation:   Behavioral-Environmental Outcomes: None Identified  Food/Nutrient Intake Outcomes: Food and Nutrient Intake, Supplement Intake  Physical Signs/Symptoms Outcomes: Weight, Nutrition Focused Physical Findings, Biochemical Data, GI Status    Discharge Planning:    Continue current diet, Continue Oral Nutrition Supplement     Sil Malloy MS, [address] [place] [address],   Contact: Office: [phone]; [address]: [phone]

## 2023-03-02 NOTE — PROGRESS NOTES
Hospitalist Progress Note      PCP: Jesús Forman    Date of Admission: 2/26/2023    Chief Complaint:   Hypoglycemia     Subjective:    Patient states she is having some abdominal pain. Patient states she is having loose stools. Medications:  Reviewed    Infusion Medications    dextrose      sodium chloride       Scheduled Medications    insulin glargine  8 Units SubCUTAneous Nightly    polyethylene glycol  4,000 mL Oral Once    insulin lispro  0-4 Units SubCUTAneous TID WC    insulin lispro  0-4 Units SubCUTAneous Nightly    polyethylene glycol  17 g Oral Daily    ticagrelor  90 mg Oral BID    aspirin  81 mg Oral Daily    atorvastatin  20 mg Oral Daily    calcium carb-cholecalciferol  1 tablet Oral Daily    gabapentin  600 mg Oral TID    [Held by provider] lisinopril  40 mg Oral Daily    paliperidone  9 mg Oral QAM    pantoprazole  40 mg Oral Daily    traZODone  200 mg Oral Nightly    sodium chloride flush  5-40 mL IntraVENous 2 times per day    enoxaparin  40 mg SubCUTAneous Daily     PRN Meds: hydrALAZINE, oxyCODONE-acetaminophen, glucose, dextrose bolus **OR** dextrose bolus, glucagon (rDNA), dextrose, clonazePAM, sodium chloride flush, sodium chloride, ondansetron **OR** ondansetron, polyethylene glycol, acetaminophen **OR** acetaminophen      Intake/Output Summary (Last 24 hours) at 3/2/2023 1542  Last data filed at 3/2/2023 1155  Gross per 24 hour   Intake 240 ml   Output 400 ml   Net -160 ml         Physical Exam Performed:    BP (!) 154/67   Pulse 68   Temp 98.5 °F (36.9 °C) (Oral)   Resp 17   Ht 5' 3\" (1.6 m)   Wt 121 lb 8 oz (55.1 kg)   SpO2 96%   BMI 21.52 kg/m²     General appearance: No apparent distress, appears stated age and cooperative. HEENT: Pupils equal, round, and reactive to light. Conjunctivae/corneas clear. Neck: Supple, with full range of motion. No jugular venous distention. Trachea midline. Respiratory:  Normal respiratory effort.  Clear to auscultation, bilaterally without Rales/Wheezes/Rhonchi. Cardiovascular: Regular rate and rhythm with normal S1/S2 without murmurs, rubs or gallops. Abdomen: Slightly tympanic positive bowel sounds noted. Musculoskeletal: No clubbing, cyanosis or edema bilaterally. Full range of motion without deformity. Skin: Skin color, texture, turgor normal.  No rashes or lesions. Neurologic:  Neurovascularly intact without any focal sensory/motor deficits. Cranial nerves: II-XII intact, grossly non-focal.  Psychiatric: Alert and oriented x3. Capillary Refill: Brisk, 3 seconds, normal   Peripheral Pulses: +2 palpable, equal bilaterally       Labs:   Recent Labs     02/28/23 0338 03/01/23 0428   WBC 4.3 4.7   HGB 8.5* 8.5*   HCT 27.4* 27.8*    147       Recent Labs     02/28/23 0338 03/01/23 0428 03/01/23  1600    138 138   K 5.6* 4.7 4.2    105 101   CO2 18* 24 27   BUN 13 16 16   CREATININE 1.6* 1.2 1.3*   CALCIUM 8.5 8.6 8.7       No results for input(s): AST, ALT, BILIDIR, BILITOT, ALKPHOS in the last 72 hours. No results for input(s): INR in the last 72 hours. No results for input(s): [place] Bounds in the last 72 hours. Urinalysis:      Lab Results   Component Value Date/Time    NITRU Negative 12/10/2022 01:42 PM    WBCUA 3-5 12/10/2022 01:42 PM    BACTERIA 2+ 12/10/2022 01:42 PM    RBCUA 5-10 12/10/2022 01:42 PM    BLOODU MODERATE 12/10/2022 01:42 PM    SPECGRAV >=1.030 12/10/2022 01:42 PM    GLUCOSEU >=1000 12/10/2022 01:42 PM    GLUCOSEU >=1000 mg/dL 06/07/2010 03:38 PM       Radiology:  CT ABDOMEN PELVIS WO CONTRAST Additional Contrast? None   Final Result   1. No acute abdominal or pelvic findings. 2.  Large amount of formed stool throughout the colon. Correlate with any   evidence of constipation. XR ABDOMEN (KUB) (SINGLE AP VIEW)   Final Result   1. Moderate stool in the colon, correlate with signs of constipation.          US RETROPERITONEAL COMPLETE   Final Result   Left kidney is unremarkable         CT HEAD WO CONTRAST   Final Result   No acute intracranial abnormality. Mild cerebral white matter chronic microvascular ischemic disease. Chronic right maxillary sinus opacification in setting of chronic sinusitis. 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 Patient was apparently found unresponsive on the toilet while doing her bowel prep for a colonoscopy procedure scheduled tomorrow with [facility]. She also supposed to get an EGD. Patient reports she remembers getting diaphoretic and weak just prior to using the bathroom. And after she sat on the toilet she became very lightheaded and lost her consciousness. She reports she takes 22 units of Lantus in the morning which she did. Was instructed not to eat anything so she drank some water and Gatorade in the morning. She also proceeded to take 8 units of the short acting in the morning. Patient reports she is not aware that she is not supposed to take the short acting insulin with a meal if she was not going to eat anything for the meal.  She has been on insulin and a diabetic for about 10 years. EMS was called, and they reported her blood sugar was 40 at the scene. \"      1. Hypoglycemia due to insulin use with decreased p.o. intake. Blood sugars are labile. We will continue sliding scale.   Patient on long-lasting insulin but had some hypoglycemia. We will reduce the dose tonight. 2.  SHARRON with acidosis. Patient was on a bicarb drip. We will discontinue drip and recheck BMP in afternoon. 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 Continue to monitor renal function. 3.  Weight loss. Patient with abnormal CEA. Patient will follow with GI for abnormal C-scope. CT scan did show constipation. GI following. Patient will receive prep. She continues to have obstructive picture she may need surgery evaluation. 4.  Hypertension. Lisinopril on hold due to SHARRON. Blood pressure elevated. Patient with 1 or more chronic illnesses with severe exacerbation or side effects of treatment and/or 1 acute or chronic illness that poses threat to life or bodily function. Decision regarding hospitalization or escalation  Patient on drug therapy that requires intensive monitoring. DVT Prophylaxis: Lovenox  Diet: ADULT DIET; Regular; 4 carb choices (60 gm/meal); Low Potassium (Less than 3000 mg/day); Low Fiber  ADULT ORAL NUTRITION SUPPLEMENT; Breakfast, Lunch, Dinner; Diabetic Oral Supplement  Code Status: Full Code  PT/OT Eval Status: patient ambulatory at home    [address] [place] [address] when renal function improves.     Appropriate for A1 Discharge Unit: Yes      Carmen Fowler MD

## 2023-03-02 NOTE — CARE COORDINATION
Patient plans discharging home alone with [facility]. States friends and neighbors available to assist as needed. Update per Sr Cancino. Patient still experiencing abdominal pain. Wants GI to see her again to evaluate further.       Mary Jane Daly RN

## 2023-03-02 NOTE — PROGRESS NOTES
MD made aware of CT results. RN notified him that patient has had 2 diarrhea-like bowel movements this am and is still complaining of abdominal pain.     Kaykay Ramirez RN

## 2023-03-02 NOTE — PROGRESS NOTES
PROGRESS NOTE    HPI: Geoff Vines is a(n)62 y.o. female admitted for work-up and treatment for Confusion [R41.0]  Hypoglycemia [E16.2]  Hypoglycemia due to insulin [E16.0, T38.3X5A]. We are following for abdominal pain, constipation. Subjective:     Continues with abdominal pain. However, tolerating diet. Loose stools. Objective:     I/O last 3 completed shifts: In: 240 [P.O.:240]  Out: -       BP (!) 182/76   Pulse 54   Temp 98.2 °F (36.8 °C) (Oral)   Resp 18   Ht 5' 3\" (1.6 m)   Wt 121 lb 8 oz (55.1 kg)   SpO2 99%   BMI 21.52 kg/m²     Physical Exam:  HEENT: anicteric sclera, oropharyngeal membranes pink and moist.  Cor: RRR  Lungs: non-labored, no respiratory distress  Abdomen: distended but soft, mild mid to lower abdominal tenderness  Extremities: no edema  Neuro: alert and oriented x 3      Results:   Lab Results   Component Value Date    ALT 40 02/26/2023    AST 39 (H) 02/26/2023     (H) 06/03/2021    ALKPHOS 162 (H) 02/26/2023    BILIDIR <0.2 02/10/2023    PROT 7.1 02/26/2023    LABALBU 3.3 (L) 02/26/2023    INR 0.97 12/14/2022    LIPASE 5.0 (L) 02/10/2023     Lab Results   Component Value Date    WBC 4.7 03/01/2023    HGB 8.5 (L) 03/01/2023    HCT 27.8 (L) 03/01/2023    MCV 89.4 03/01/2023     03/01/2023     BUN/Cr/glu/ALT/AST/amyl/lip:  16/1.3/--/--/--/--/-- (03/01 1600)  CT ABDOMEN PELVIS WO CONTRAST Additional Contrast? None    Result Date: 3/2/2023  1. No acute abdominal or pelvic findings. 2.  Large amount of formed stool throughout the colon. Correlate with any evidence of constipation. XR ABDOMEN (KUB) (SINGLE AP VIEW)    Result Date: 3/2/2023  1. Moderate stool in the colon, correlate with signs of constipation. CT HEAD WO CONTRAST    Result Date: 2/26/2023  No acute intracranial abnormality. Mild cerebral white matter chronic microvascular ischemic disease.  Chronic right maxillary sinus opacification in setting of chronic sinusitis. US RETROPERITONEAL COMPLETE    Result Date: 2/28/2023  Left kidney is unremarkable         Impression:  60year old female with diarrhea, anemia, weight loss and abnormal PET scan of the colon, elevated CEA. Admitted with hypoglycemia following bowel prep for outpatient colonoscopy. Underwent inpatient scope yesterday with findings of narrowing at around the proximal transverse colon or hepatic flexure. Unable to traverse with scope and poor prep prevented optimal evaluation. - CT imaging this morning with large amount of stool throughout the colon. No stricture noted. Her biopsies are pending. Plan:  Continued abdominal pain likely secondary to significant constipation as noted on CT today. Will give bowel prep. Low threshold to consult surgery if she does not tolerate prep/ has worsening pain given colonoscopy findings. 2. Follow-up biopsy results. Please do not hesitate to call with questions or concerns.       Electronically signed by: SARTHAK Calderon 3/2/2023 11:45 AM

## 2023-03-02 NOTE — PROGRESS NOTES
Patient admitted to room 102 from 361.  Patient oriented to room, call light, bed rails, phone, lights and bathroom.  Patient instructed about the schedule of the day including: vital sign frequency, lab draws, possible tests, frequency of MD and staff rounds, including RN/MD rounding together at bedside, daily weights, and I &O's.  Patient instructed about prescribed diet, how to use 8MENU, and television.  Bed alarm in place, patient aware of placement and reason.  Bed locked, in lowest position, side rails up 2/4, call light within reach.  Will continue to monitor.      Sri Means, RN

## 2023-03-03 LAB
GLUCOSE BLD-MCNC: 293 MG/DL (ref 70–99)
GLUCOSE BLD-MCNC: 401 MG/DL (ref 70–99)
GLUCOSE BLD-MCNC: 412 MG/DL (ref 70–99)
GLUCOSE BLD-MCNC: 61 MG/DL (ref 70–99)
GLUCOSE BLD-MCNC: 93 MG/DL (ref 70–99)
PERFORMED ON: ABNORMAL
PERFORMED ON: NORMAL

## 2023-03-03 PROCEDURE — 6360000002 HC RX W HCPCS: Performed by: INTERNAL MEDICINE

## 2023-03-03 PROCEDURE — 6370000000 HC RX 637 (ALT 250 FOR IP): Performed by: INTERNAL MEDICINE

## 2023-03-03 PROCEDURE — 2580000003 HC RX 258: Performed by: INTERNAL MEDICINE

## 2023-03-03 PROCEDURE — 1200000000 HC SEMI PRIVATE

## 2023-03-03 RX ADMIN — INSULIN HUMAN 4 UNITS: 100 INJECTION, SUSPENSION SUBCUTANEOUS at 13:31

## 2023-03-03 RX ADMIN — GABAPENTIN 600 MG: 300 CAPSULE ORAL at 20:05

## 2023-03-03 RX ADMIN — INSULIN LISPRO 4 UNITS: 100 INJECTION, SOLUTION INTRAVENOUS; SUBCUTANEOUS at 21:24

## 2023-03-03 RX ADMIN — TICAGRELOR 90 MG: 90 TABLET ORAL at 08:43

## 2023-03-03 RX ADMIN — ATORVASTATIN CALCIUM 20 MG: 10 TABLET, FILM COATED ORAL at 08:43

## 2023-03-03 RX ADMIN — TRAZODONE HYDROCHLORIDE 200 MG: 50 TABLET ORAL at 20:04

## 2023-03-03 RX ADMIN — ASPIRIN 81 MG: 81 TABLET, COATED ORAL at 08:43

## 2023-03-03 RX ADMIN — CLONAZEPAM 0.5 MG: 0.5 TABLET ORAL at 14:54

## 2023-03-03 RX ADMIN — INSULIN LISPRO 4 UNITS: 100 INJECTION, SOLUTION INTRAVENOUS; SUBCUTANEOUS at 12:37

## 2023-03-03 RX ADMIN — ENOXAPARIN SODIUM 40 MG: 100 INJECTION SUBCUTANEOUS at 08:44

## 2023-03-03 RX ADMIN — INSULIN LISPRO 2 UNITS: 100 INJECTION, SOLUTION INTRAVENOUS; SUBCUTANEOUS at 17:27

## 2023-03-03 RX ADMIN — TICAGRELOR 90 MG: 90 TABLET ORAL at 20:05

## 2023-03-03 RX ADMIN — SODIUM CHLORIDE, PRESERVATIVE FREE 10 ML: 5 INJECTION INTRAVENOUS at 08:44

## 2023-03-03 RX ADMIN — Medication 1 TABLET: at 08:44

## 2023-03-03 RX ADMIN — INSULIN HUMAN 4 UNITS: 100 INJECTION, SUSPENSION SUBCUTANEOUS at 21:25

## 2023-03-03 RX ADMIN — POLYETHYLENE GLYCOL 3350 17 G: 17 POWDER, FOR SOLUTION ORAL at 08:43

## 2023-03-03 RX ADMIN — PALIPERIDONE 9 MG: 3 TABLET, EXTENDED RELEASE ORAL at 08:43

## 2023-03-03 RX ADMIN — GABAPENTIN 600 MG: 300 CAPSULE ORAL at 14:12

## 2023-03-03 RX ADMIN — OXYCODONE AND ACETAMINOPHEN 1 TABLET: 7.5; 325 TABLET ORAL at 08:51

## 2023-03-03 RX ADMIN — GABAPENTIN 600 MG: 300 CAPSULE ORAL at 08:43

## 2023-03-03 RX ADMIN — PANTOPRAZOLE SODIUM 40 MG: 40 TABLET, DELAYED RELEASE ORAL at 08:43

## 2023-03-03 RX ADMIN — SODIUM CHLORIDE, PRESERVATIVE FREE 10 ML: 5 INJECTION INTRAVENOUS at 20:06

## 2023-03-03 ASSESSMENT — PAIN SCALES - GENERAL: PAINLEVEL_OUTOF10: 7

## 2023-03-03 ASSESSMENT — PAIN DESCRIPTION - DESCRIPTORS: DESCRIPTORS: ACHING

## 2023-03-03 ASSESSMENT — PAIN DESCRIPTION - LOCATION: LOCATION: BACK

## 2023-03-03 NOTE — PROGRESS NOTES
Hospitalist Progress Note      PCP: Bruna Chen    Date of Admission: 2/26/2023    Chief Complaint:   Hypoglycemia     Subjective:    Patient states she did have bowel movement. She states abdominal pain has improved. She is concerned about her labile blood sugars. She did have blood sugars down in the 60s this morning. In the evening blood sugars may be up to 400s. Medications:  Reviewed    Infusion Medications    dextrose      sodium chloride       Scheduled Medications    insulin NPH  4 Units SubCUTAneous Nightly    [START ON 3/4/2023] insulin NPH  8 Units SubCUTAneous QAM    insulin regular  10 Units SubCUTAneous Once    insulin lispro  0-4 Units SubCUTAneous TID WC    insulin lispro  0-4 Units SubCUTAneous Nightly    polyethylene glycol  17 g Oral Daily    ticagrelor  90 mg Oral BID    aspirin  81 mg Oral Daily    atorvastatin  20 mg Oral Daily    calcium carb-cholecalciferol  1 tablet Oral Daily    gabapentin  600 mg Oral TID    [Held by provider] lisinopril  40 mg Oral Daily    paliperidone  9 mg Oral QAM    pantoprazole  40 mg Oral Daily    traZODone  200 mg Oral Nightly    sodium chloride flush  5-40 mL IntraVENous 2 times per day    enoxaparin  40 mg SubCUTAneous Daily     PRN Meds: hydrALAZINE, oxyCODONE-acetaminophen, glucose, dextrose bolus **OR** dextrose bolus, glucagon (rDNA), dextrose, clonazePAM, sodium chloride flush, sodium chloride, ondansetron **OR** ondansetron, polyethylene glycol, acetaminophen **OR** acetaminophen      Intake/Output Summary (Last 24 hours) at 3/3/2023 1521  Last data filed at 3/3/2023 1322  Gross per 24 hour   Intake 360 ml   Output --   Net 360 ml         Physical Exam Performed:    BP (!) 146/74   Pulse 63   Temp 97.1 °F (36.2 °C) (Oral)   Resp 17   Ht 5' 3\" (1.6 m)   Wt 121 lb 8 oz (55.1 kg)   SpO2 100%   BMI 21.52 kg/m²     General appearance: No apparent distress, appears stated age and cooperative.   HEENT: Pupils equal, round, and reactive to light. Conjunctivae/corneas clear. Neck: Supple, with full range of motion. No jugular venous distention. Trachea midline. Respiratory:  Normal respiratory effort. Clear to auscultation, bilaterally without Rales/Wheezes/Rhonchi. Cardiovascular: Regular rate and rhythm with normal S1/S2 without murmurs, rubs or gallops. Abdomen: Slightly tympanic positive bowel sounds noted. Musculoskeletal: No clubbing, cyanosis or edema bilaterally. Full range of motion without deformity. Skin: Skin color, texture, turgor normal.  No rashes or lesions. Neurologic:  Neurovascularly intact without any focal sensory/motor deficits. Cranial nerves: II-XII intact, grossly non-focal.  Psychiatric: Alert and oriented x3. Capillary Refill: Brisk, 3 seconds, normal   Peripheral Pulses: +2 palpable, equal bilaterally       Labs:   Recent Labs     03/01/23  0428   WBC 4.7   HGB 8.5*   HCT 27.8*          Recent Labs     03/01/23  0428 03/01/23  1600    138   K 4.7 4.2    101   CO2 24 27   BUN 16 16   CREATININE 1.2 1.3*   CALCIUM 8.6 8.7       No results for input(s): AST, ALT, BILIDIR, BILITOT, ALKPHOS in the last 72 hours. No results for input(s): INR in the last 72 hours. No results for input(s): [place]ounds in the last 72 hours. Urinalysis:      Lab Results   Component Value Date/Time    NITRU Negative 12/10/2022 01:42 PM    WBCUA 3-5 12/10/2022 01:42 PM    BACTERIA 2+ 12/10/2022 01:42 PM    RBCUA 5-10 12/10/2022 01:42 PM    BLOODU MODERATE 12/10/2022 01:42 PM    SPECGRAV >=1.030 12/10/2022 01:42 PM    GLUCOSEU >=1000 12/10/2022 01:42 PM    GLUCOSEU >=1000 mg/dL 06/07/2010 03:38 PM       Radiology:  CT ABDOMEN PELVIS WO CONTRAST Additional Contrast? None   Final Result   1. No acute abdominal or pelvic findings. 2.  Large amount of formed stool throughout the colon. Correlate with any   evidence of constipation. XR ABDOMEN (KUB) (SINGLE AP VIEW)   Final Result   1.  Moderate stool in the colon, correlate with signs of constipation. US RETROPERITONEAL COMPLETE   Final Result   Left kidney is unremarkable         CT HEAD WO CONTRAST   Final Result   No acute intracranial abnormality. Mild cerebral white matter chronic microvascular ischemic disease. Chronic right maxillary sinus opacification in setting of chronic sinusitis. 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 [address] [C50.911, C50.912]     Tobacco use disorder [F17.200]          \"Patient with PMH of DM 2, HTN, CAD s/p PCI, bipolar disorder presented to the ED today for unresponsive state. Patient was apparently found unresponsive on the toilet while doing her bowel prep for a colonoscopy procedure scheduled tomorrow with [facility]. She also supposed to get an EGD. Patient reports she remembers getting diaphoretic and weak just prior to using the bathroom. And after she sat on the toilet she became very lightheaded and lost her consciousness. She reports she takes 22 units of Lantus in the morning which she did. Was instructed not to eat anything so she drank some water and Gatorade in the morning. She also proceeded to take 8 units of the short acting in the morning. Patient reports she is not aware that she is not supposed to take the short acting insulin with a meal if she was not going to eat anything for the meal.  She has been on insulin and a diabetic for about 10 years. EMS was called, and they reported her blood sugar was 40 at the scene. \"      1.   Hypoglycemia due to insulin use with decreased p.o. intake. Blood sugars are labile. We will switch patient to NPH and give her more insulin in the a.m. and less in the p.m. to avoid hypoglycemia in AM.    2.  SHARRON with acidosis. Patient was on a bicarb drip. We will discontinue drip and recheck BMP in afternoon. 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 Continue to monitor renal function. Creatinine 1.3  3. Weight loss. Patient with abnormal CEA. Patient will follow with GI for abnormal C-scope. CT scan did show constipation. GI following. Patient having bowel movements and has improved. Awaiting biopsy report. 4.  Hypertension. Lisinopril on hold due to SHARRON. Blood pressure elevated. Blood pressure labile. Patient with 1 or more chronic illnesses with severe exacerbation or side effects of treatment and/or 1 acute or chronic illness that poses threat to life or bodily function. Decision regarding hospitalization or escalation  Patient on drug therapy that requires intensive monitoring. DVT Prophylaxis: Lovenox  Diet: ADULT DIET; Regular; 4 carb choices (60 gm/meal); Low Potassium (Less than 3000 mg/day); Low Fiber  ADULT ORAL NUTRITION SUPPLEMENT; Breakfast, Lunch, Dinner; Diabetic Oral Supplement  Code Status: Full Code  PT/OT Eval Status: patient ambulatory at home    [address] [place] [address] when renal function improves.     Appropriate for A1 Discharge Unit: Yes      Gretel Laureano MD

## 2023-03-03 NOTE — PLAN OF CARE
Problem: Discharge Planning  Goal: Discharge to home or other facility with appropriate resources  Outcome: Progressing     Problem: Pain  Goal: Verbalizes/displays adequate comfort level or baseline comfort level  Outcome: Progressing     Problem: Chronic Conditions and Co-morbidities  Goal: Patient's chronic conditions and co-morbidity symptoms are monitored and maintained or improved  Outcome: Progressing     Problem: Gastrointestinal - Adult  Goal: Minimal or absence of nausea and vomiting  Outcome: Progressing  Goal: Maintains or returns to baseline bowel function  3/3/2023 0926 by Christian Rothman RN  Outcome: Progressing  3/2/2023 2259 by Miriam Rodriguez RN  Flowsheets (Taken 2/28/2023 1600 by Tim Zafar RN)  Maintains or returns to baseline bowel function:   Assess bowel function   Encourage oral fluids to ensure adequate hydration   Administer IV fluids as ordered to ensure adequate hydration  Goal: Maintains adequate nutritional intake  Outcome: Progressing     Problem: Metabolic/Fluid and Electrolytes - Adult  Goal: Glucose maintained within prescribed range  Outcome: Progressing  Goal: Electrolytes maintained within normal limits  Outcome: Progressing     Problem: Safety - Adult  Goal: Free from fall injury  Outcome: Progressing     Problem: Nutrition Deficit:  Goal: Optimize nutritional status  Outcome: Progressing

## 2023-03-03 NOTE — PLAN OF CARE
Problem: Gastrointestinal - Adult  Goal: Maintains or returns to baseline bowel function  Flowsheets (Taken 2/28/2023 1600 by Yoselyn Byers RN)  Maintains or returns to baseline bowel function:   Assess bowel function   Encourage oral fluids to ensure adequate hydration   Administer IV fluids as ordered to ensure adequate hydration

## 2023-03-03 NOTE — PROGRESS NOTES
GI Progress Note      SUBJECTIVE:  Having BMs. Abdominal discomfort improved. Denies emesis, melena, hematochezia. Tolerating oral intake. OBJECTIVE      Medications    Current Facility-Administered Medications: insulin glargine (LANTUS) injection vial 8 Units, 8 Units, SubCUTAneous, Nightly  insulin regular (HUMULIN R;NOVOLIN R) injection 10 Units, 10 Units, SubCUTAneous, Once  hydrALAZINE (APRESOLINE) injection 5 mg, 5 mg, IntraVENous, Q4H PRN  oxyCODONE-acetaminophen (PERCOCET) 7.5-325 MG per tablet 1 tablet, 1 tablet, Oral, Q8H PRN  insulin lispro (HUMALOG) injection vial 0-4 Units, 0-4 Units, SubCUTAneous, TID WC  insulin lispro (HUMALOG) injection vial 0-4 Units, 0-4 Units, SubCUTAneous, Nightly  glucose chewable tablet 16 g, 4 tablet, Oral, PRN  dextrose bolus 10% 125 mL, 125 mL, IntraVENous, PRN **OR** dextrose bolus 10% 250 mL, 250 mL, IntraVENous, PRN  glucagon (rDNA) injection 1 mg, 1 mg, IntraMUSCular, PRN  dextrose 10 % infusion, , IntraVENous, Continuous PRN  polyethylene glycol (GLYCOLAX) packet 17 g, 17 g, Oral, Daily  ticagrelor (BRILINTA) tablet 90 mg, 90 mg, Oral, BID  aspirin EC tablet 81 mg, 81 mg, Oral, Daily  atorvastatin (LIPITOR) tablet 20 mg, 20 mg, Oral, Daily  clonazePAM (KLONOPIN) tablet 0.5 mg, 0.5 mg, Oral, TID PRN  calcium carb-cholecalciferol 250-3. 125 MG-MCG per tab 1 tablet, 1 tablet, Oral, Daily  gabapentin (NEURONTIN) capsule 600 mg, 600 mg, Oral, TID  [Held by provider] lisinopril (PRINIVIL;ZESTRIL) tablet 40 mg, 40 mg, Oral, Daily  paliperidone (INVEGA) extended release tablet 9 mg, 9 mg, Oral, QAM  pantoprazole (PROTONIX) tablet 40 mg, 40 mg, Oral, Daily  traZODone (DESYREL) tablet 200 mg, 200 mg, Oral, Nightly  sodium chloride flush 0.9 % injection 5-40 mL, 5-40 mL, IntraVENous, 2 times per day  sodium chloride flush 0.9 % injection 5-40 mL, 5-40 mL, IntraVENous, PRN  0.9 % sodium chloride infusion, , IntraVENous, PRN  enoxaparin (LOVENOX) injection 40 mg, 40 mg, SubCUTAneous, Daily  ondansetron (ZOFRAN-ODT) disintegrating tablet 4 mg, 4 mg, Oral, Q8H PRN **OR** ondansetron (ZOFRAN) injection 4 mg, 4 mg, IntraVENous, Q6H PRN  polyethylene glycol (GLYCOLAX) packet 17 g, 17 g, Oral, Daily PRN  acetaminophen (TYLENOL) tablet 650 mg, 650 mg, Oral, Q6H PRN **OR** acetaminophen (TYLENOL) suppository 650 mg, 650 mg, Rectal, Q6H PRN  Physical    VITALS:  BP (!) 146/74   Pulse 63   Temp 97.1 °F (36.2 °C) (Oral)   Resp 17   Ht 5' 3\" (1.6 m)   Wt 121 lb 8 oz (55.1 kg)   SpO2 100%   BMI 21.52 kg/m²   ABD: soft, mild epigastric tenderness, NABS, ND  Data    CBC with Differential:    Lab Results   Component Value Date/Time    WBC 4.7 03/01/2023 04:28 AM    RBC 3.11 03/01/2023 04:28 AM    HGB 8.5 03/01/2023 04:28 AM    HCT 27.8 03/01/2023 04:28 AM     03/01/2023 04:28 AM    MCV 89.4 03/01/2023 04:28 AM    MCH 27.2 03/01/2023 04:28 AM    MCHC 30.5 03/01/2023 04:28 AM    RDW 14.0 03/01/2023 04:28 AM    SEGSPCT 71.2 05/29/2013 01:53 PM    BANDSPCT 2 02/05/2021 06:07 AM    LYMPHOPCT 34.1 02/28/2023 03:38 AM    MONOPCT 8.4 02/28/2023 03:38 AM    EOSPCT 0.4 09/05/2011 11:35 PM    BASOPCT 0.3 02/28/2023 03:38 AM    MONOSABS 0.4 02/28/2023 03:38 AM    LYMPHSABS 1.5 02/28/2023 03:38 AM    EOSABS 0.0 02/28/2023 03:38 AM    BASOSABS 0.0 02/28/2023 03:38 AM    DIFFTYPE Auto 05/29/2013 01:53 PM     CMP:    Lab Results   Component Value Date/Time     03/01/2023 04:00 PM    K 4.2 03/01/2023 04:00 PM    K 4.7 03/01/2023 04:28 AM     03/01/2023 04:00 PM    CO2 27 03/01/2023 04:00 PM    BUN 16 03/01/2023 04:00 PM    CREATININE 1.3 03/01/2023 04:00 PM    GFRAA >60 09/27/2022 09:41 AM    GFRAA >60 05/29/2013 01:53 PM    AGRATIO 0.9 02/26/2023 07:30 PM    LABGLOM 46 03/01/2023 04:00 PM    GLUCOSE 346 03/01/2023 04:00 PM    PROT 7.1 02/26/2023 07:30 PM    PROT 8.1 01/05/2013 10:34 PM    LABALBU 3.3 02/26/2023 07:30 PM    CALCIUM 8.7 03/01/2023 04:00 PM    BILITOT 0.3 02/26/2023 07:30 PM    ALKPHOS 162 02/26/2023 07:30 PM    AST 39 02/26/2023 07:30 PM    ALT 40 02/26/2023 07:30 PM     CT(3/2):   1. No acute abdominal or pelvic findings. 2.  Large amount of formed stool throughout the colon. Correlate with any   evidence of constipation. ASSESSMENT AND PLAN  59 yo F with h/o DM, HTN, CAD and bipolar DO who has diarrhea, anemia, weight loss and abnormal PET scan of the colon in the setting of an elevated CEA. Admission was prompted due to hypoglycemia following bowel prep for outpatient colonoscopy. Underwent inpatient colonoscopy (2/27)with findings of narrowing at around the proximal transverse colon or hepatic flexure. Path pending. Unable to traverse with scope and poor prep prevented optimal evaluation. CT yesterday demonstrated a large amount of stool throughout the colon. No stricture noted. She is having BMs and tolerating oral intake. Plan:  Continue daily MiraLax  2.     Await biopsy results

## 2023-03-04 ENCOUNTER — APPOINTMENT (OUTPATIENT)
Dept: GENERAL RADIOLOGY | Age: 63
DRG: 329 | End: 2023-03-04
Payer: COMMERCIAL

## 2023-03-04 LAB
ANION GAP SERPL CALCULATED.3IONS-SCNC: 11 MMOL/L (ref 3–16)
BUN BLDV-MCNC: 11 MG/DL (ref 7–20)
CALCIUM SERPL-MCNC: 9.1 MG/DL (ref 8.3–10.6)
CHLORIDE BLD-SCNC: 96 MMOL/L (ref 99–110)
CO2: 26 MMOL/L (ref 21–32)
CREAT SERPL-MCNC: 1 MG/DL (ref 0.6–1.2)
EKG ATRIAL RATE: 59 BPM
EKG DIAGNOSIS: NORMAL
EKG P AXIS: 53 DEGREES
EKG P-R INTERVAL: 174 MS
EKG Q-T INTERVAL: 418 MS
EKG QRS DURATION: 90 MS
EKG QTC CALCULATION (BAZETT): 413 MS
EKG R AXIS: -7 DEGREES
EKG T AXIS: 28 DEGREES
EKG VENTRICULAR RATE: 59 BPM
GFR SERPL CREATININE-BSD FRML MDRD: >60 ML/MIN/{1.73_M2}
GLUCOSE BLD-MCNC: 169 MG/DL (ref 70–99)
GLUCOSE BLD-MCNC: 252 MG/DL (ref 70–99)
GLUCOSE BLD-MCNC: 354 MG/DL (ref 70–99)
GLUCOSE BLD-MCNC: 381 MG/DL (ref 70–99)
GLUCOSE BLD-MCNC: 456 MG/DL (ref 70–99)
HCT VFR BLD CALC: 28.7 % (ref 36–48)
HEMOGLOBIN: 9.2 G/DL (ref 12–16)
MCH RBC QN AUTO: 28.4 PG (ref 26–34)
MCHC RBC AUTO-ENTMCNC: 32.1 G/DL (ref 31–36)
MCV RBC AUTO: 88.5 FL (ref 80–100)
PDW BLD-RTO: 13.9 % (ref 12.4–15.4)
PERFORMED ON: ABNORMAL
PLATELET # BLD: 173 K/UL (ref 135–450)
PMV BLD AUTO: 8.7 FL (ref 5–10.5)
POTASSIUM REFLEX MAGNESIUM: 4.5 MMOL/L (ref 3.5–5.1)
RBC # BLD: 3.24 M/UL (ref 4–5.2)
SODIUM BLD-SCNC: 133 MMOL/L (ref 136–145)
TROPONIN: <0.01 NG/ML
WBC # BLD: 5 K/UL (ref 4–11)

## 2023-03-04 PROCEDURE — 36415 COLL VENOUS BLD VENIPUNCTURE: CPT

## 2023-03-04 PROCEDURE — 6370000000 HC RX 637 (ALT 250 FOR IP): Performed by: INTERNAL MEDICINE

## 2023-03-04 PROCEDURE — 80048 BASIC METABOLIC PNL TOTAL CA: CPT

## 2023-03-04 PROCEDURE — 6360000002 HC RX W HCPCS: Performed by: INTERNAL MEDICINE

## 2023-03-04 PROCEDURE — 1200000000 HC SEMI PRIVATE

## 2023-03-04 PROCEDURE — 2580000003 HC RX 258: Performed by: INTERNAL MEDICINE

## 2023-03-04 PROCEDURE — 71045 X-RAY EXAM CHEST 1 VIEW: CPT

## 2023-03-04 PROCEDURE — 85027 COMPLETE CBC AUTOMATED: CPT

## 2023-03-04 PROCEDURE — 93005 ELECTROCARDIOGRAM TRACING: CPT | Performed by: INTERNAL MEDICINE

## 2023-03-04 PROCEDURE — 84484 ASSAY OF TROPONIN QUANT: CPT

## 2023-03-04 RX ORDER — INSULIN LISPRO 100 [IU]/ML
0-4 INJECTION, SOLUTION INTRAVENOUS; SUBCUTANEOUS NIGHTLY
Status: DISCONTINUED | OUTPATIENT
Start: 2023-03-04 | End: 2023-03-12

## 2023-03-04 RX ORDER — LACTULOSE 10 G/15ML
30 SOLUTION ORAL 2 TIMES DAILY
Status: DISCONTINUED | OUTPATIENT
Start: 2023-03-04 | End: 2023-03-05 | Stop reason: SDUPTHER

## 2023-03-04 RX ORDER — INSULIN LISPRO 100 [IU]/ML
0-8 INJECTION, SOLUTION INTRAVENOUS; SUBCUTANEOUS
Status: DISCONTINUED | OUTPATIENT
Start: 2023-03-04 | End: 2023-03-12

## 2023-03-04 RX ADMIN — LACTULOSE 30 G: 20 SOLUTION ORAL at 08:28

## 2023-03-04 RX ADMIN — PALIPERIDONE 9 MG: 3 TABLET, EXTENDED RELEASE ORAL at 08:27

## 2023-03-04 RX ADMIN — ATORVASTATIN CALCIUM 20 MG: 10 TABLET, FILM COATED ORAL at 08:10

## 2023-03-04 RX ADMIN — LISINOPRIL 40 MG: 20 TABLET ORAL at 13:48

## 2023-03-04 RX ADMIN — Medication 1 TABLET: at 08:16

## 2023-03-04 RX ADMIN — TICAGRELOR 90 MG: 90 TABLET ORAL at 08:10

## 2023-03-04 RX ADMIN — HYDRALAZINE HYDROCHLORIDE 5 MG: 20 INJECTION INTRAMUSCULAR; INTRAVENOUS at 09:04

## 2023-03-04 RX ADMIN — GABAPENTIN 600 MG: 300 CAPSULE ORAL at 20:21

## 2023-03-04 RX ADMIN — ENOXAPARIN SODIUM 40 MG: 100 INJECTION SUBCUTANEOUS at 08:11

## 2023-03-04 RX ADMIN — GABAPENTIN 600 MG: 300 CAPSULE ORAL at 13:48

## 2023-03-04 RX ADMIN — INSULIN HUMAN 4 UNITS: 100 INJECTION, SUSPENSION SUBCUTANEOUS at 17:20

## 2023-03-04 RX ADMIN — ASPIRIN 81 MG: 81 TABLET, COATED ORAL at 08:10

## 2023-03-04 RX ADMIN — TICAGRELOR 90 MG: 90 TABLET ORAL at 20:21

## 2023-03-04 RX ADMIN — OXYCODONE AND ACETAMINOPHEN 1 TABLET: 7.5; 325 TABLET ORAL at 20:21

## 2023-03-04 RX ADMIN — OXYCODONE AND ACETAMINOPHEN 1 TABLET: 7.5; 325 TABLET ORAL at 00:08

## 2023-03-04 RX ADMIN — CLONAZEPAM 0.5 MG: 0.5 TABLET ORAL at 10:01

## 2023-03-04 RX ADMIN — SODIUM CHLORIDE, PRESERVATIVE FREE 5 ML: 5 INJECTION INTRAVENOUS at 08:30

## 2023-03-04 RX ADMIN — TRAZODONE HYDROCHLORIDE 200 MG: 50 TABLET ORAL at 20:21

## 2023-03-04 RX ADMIN — GABAPENTIN 600 MG: 300 CAPSULE ORAL at 08:09

## 2023-03-04 RX ADMIN — INSULIN LISPRO 8 UNITS: 100 INJECTION, SOLUTION INTRAVENOUS; SUBCUTANEOUS at 17:22

## 2023-03-04 RX ADMIN — Medication 10 ML: at 09:04

## 2023-03-04 RX ADMIN — INSULIN LISPRO 4 UNITS: 100 INJECTION, SOLUTION INTRAVENOUS; SUBCUTANEOUS at 12:44

## 2023-03-04 RX ADMIN — PANTOPRAZOLE SODIUM 40 MG: 40 TABLET, DELAYED RELEASE ORAL at 08:11

## 2023-03-04 RX ADMIN — INSULIN HUMAN 8 UNITS: 100 INJECTION, SUSPENSION SUBCUTANEOUS at 08:32

## 2023-03-04 RX ADMIN — LACTULOSE 30 G: 20 SOLUTION ORAL at 20:20

## 2023-03-04 RX ADMIN — OXYCODONE AND ACETAMINOPHEN 1 TABLET: 7.5; 325 TABLET ORAL at 07:54

## 2023-03-04 RX ADMIN — SODIUM CHLORIDE, PRESERVATIVE FREE 10 ML: 5 INJECTION INTRAVENOUS at 20:21

## 2023-03-04 ASSESSMENT — PAIN SCALES - GENERAL
PAINLEVEL_OUTOF10: 7
PAINLEVEL_OUTOF10: 0
PAINLEVEL_OUTOF10: 7
PAINLEVEL_OUTOF10: 10
PAINLEVEL_OUTOF10: 8

## 2023-03-04 ASSESSMENT — PAIN DESCRIPTION - ORIENTATION
ORIENTATION: UPPER
ORIENTATION: UPPER

## 2023-03-04 ASSESSMENT — PAIN DESCRIPTION - LOCATION
LOCATION: BACK

## 2023-03-04 ASSESSMENT — PAIN DESCRIPTION - DESCRIPTORS: DESCRIPTORS: ACHING

## 2023-03-04 ASSESSMENT — PAIN - FUNCTIONAL ASSESSMENT: PAIN_FUNCTIONAL_ASSESSMENT: ACTIVITIES ARE NOT PREVENTED

## 2023-03-04 NOTE — PROGRESS NOTES
Hospitalist Progress Note      PCP: Km Stewart    Date of Admission: 2/26/2023    Chief Complaint:   Hypoglycemia     Subjective:    Hypoglycemia improved. Patient denies any problems overnight. She states she has not had a large bowel movement yet. She denies any increase in abdominal pain. She states her abdominal pain has improved. No fevers or chills. Medications:  Reviewed    Infusion Medications    dextrose      sodium chloride       Scheduled Medications    lactulose  30 g Oral BID    insulin lispro  0-8 Units SubCUTAneous TID WC    insulin lispro  0-4 Units SubCUTAneous Nightly    insulin NPH  4 Units SubCUTAneous Nightly    insulin NPH  8 Units SubCUTAneous QAM    insulin regular  10 Units SubCUTAneous Once    ticagrelor  90 mg Oral BID    aspirin  81 mg Oral Daily    atorvastatin  20 mg Oral Daily    calcium carb-cholecalciferol  1 tablet Oral Daily    gabapentin  600 mg Oral TID    lisinopril  40 mg Oral Daily    paliperidone  9 mg Oral QAM    pantoprazole  40 mg Oral Daily    traZODone  200 mg Oral Nightly    sodium chloride flush  5-40 mL IntraVENous 2 times per day    enoxaparin  40 mg SubCUTAneous Daily     PRN Meds: hydrALAZINE, oxyCODONE-acetaminophen, glucose, dextrose bolus **OR** dextrose bolus, glucagon (rDNA), dextrose, clonazePAM, sodium chloride flush, sodium chloride, ondansetron **OR** ondansetron, polyethylene glycol, acetaminophen **OR** acetaminophen      Intake/Output Summary (Last 24 hours) at 3/4/2023 1501  Last data filed at 3/4/2023 1311  Gross per 24 hour   Intake 600 ml   Output --   Net 600 ml         Physical Exam Performed:    BP (!) 160/78   Pulse 59   Temp 98.3 °F (36.8 °C) (Oral)   Resp 16   Ht 5' 3\" (1.6 m)   Wt 121 lb 8 oz (55.1 kg)   SpO2 100%   BMI 21.52 kg/m²     General appearance: No apparent distress, appears stated age and cooperative. HEENT: Pupils equal, round, and reactive to light. Conjunctivae/corneas clear.   Neck: Supple, with full range of motion. No jugular venous distention. Trachea midline. Respiratory:  Normal respiratory effort. Clear to auscultation, bilaterally without Rales/Wheezes/Rhonchi. Cardiovascular: Regular rate and rhythm with normal S1/S2 without murmurs, rubs or gallops. Abdomen: Soft nontender. Positive bowel sounds. Musculoskeletal: No clubbing, cyanosis or edema bilaterally. Full range of motion without deformity. Skin: Skin color, texture, turgor normal.  No rashes or lesions. Neurologic:  Neurovascularly intact without any focal sensory/motor deficits. Cranial nerves: II-XII intact, grossly non-focal.  Psychiatric: Alert and oriented x3. Capillary Refill: Brisk, 3 seconds, normal   Peripheral Pulses: +2 palpable, equal bilaterally       Labs:   Recent Labs     03/04/23  1034   WBC 5.0   HGB 9.2*   HCT 28.7*          Recent Labs     03/01/23  1600 03/04/23  1012    133*   K 4.2 4.5    96*   CO2 27 26   BUN 16 11   CREATININE 1.3* 1.0   CALCIUM 8.7 9.1       No results for input(s): AST, ALT, BILIDIR, BILITOT, ALKPHOS in the last 72 hours. No results for input(s): INR in the last 72 hours. Recent Labs     03/04/23  1039   TROPONINI <0.01       Urinalysis:      Lab Results   Component Value Date/Time    NITRU Negative 12/10/2022 01:42 PM    WBCUA 3-5 12/10/2022 01:42 PM    BACTERIA 2+ 12/10/2022 01:42 PM    RBCUA 5-10 12/10/2022 01:42 PM    BLOODU MODERATE 12/10/2022 01:42 PM    SPECGRAV >=1.030 12/10/2022 01:42 PM    GLUCOSEU >=1000 12/10/2022 01:42 PM    GLUCOSEU >=1000 mg/dL 06/07/2010 03:38 PM       Radiology:  XR CHEST PORTABLE   Preliminary Result   No evidence of edema or pneumonia. Mild cardiac silhouette enlargement. CT ABDOMEN PELVIS WO CONTRAST Additional Contrast? None   Final Result   1. No acute abdominal or pelvic findings. 2.  Large amount of formed stool throughout the colon. Correlate with any   evidence of constipation.          XR ABDOMEN (KUB) (SINGLE AP VIEW)   Final Result   1. Moderate stool in the colon, correlate with signs of constipation. US RETROPERITONEAL COMPLETE   Final Result   Left kidney is unremarkable         CT HEAD WO CONTRAST   Final Result   No acute intracranial abnormality. Mild cerebral white matter chronic microvascular ischemic disease. Chronic right maxillary sinus opacification in setting of chronic sinusitis. 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 Patient was apparently found unresponsive on the toilet while doing her bowel prep for a colonoscopy procedure scheduled tomorrow with [facility]. She also supposed to get an EGD. Patient reports she remembers getting diaphoretic and weak just prior to using the bathroom. And after she sat on the toilet she became very lightheaded and lost her consciousness. She reports she takes 22 units of Lantus in the morning which she did. Was instructed not to eat anything so she drank some water and Gatorade in the morning. She also proceeded to take 8 units of the short acting in the morning. Patient reports she is not aware that she is not supposed to take the short acting insulin with a meal if she was not going to eat anything for the meal.  She has been on insulin and a diabetic for about 10 years.   EMS was called, and they reported her blood sugar was 40 at the scene.\"      1.  Hypoglycemia due to insulin use with decreased p.o. intake.  Blood sugars are labile.  Patient switched to NPH.  Sliding scale increased.  Will monitor closely.    2.  SHARRON with acidosis.  Patient was on a bicarb drip.  We will discontinue drip and recheck BMP in afternoon.  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  Continue to monitor renal function.  Creatinine 1.0  3.  Weight loss.  Patient with abnormal CEA.  Patient will follow with GI for abnormal C-scope.  CT scan did show constipation.  GI following.  Patient having bowel movements and has improved.  Awaiting biopsy report.  4.  Hypertension.  Blood pressure labile.  Will restart lisinopril.    Patient with 1 or more chronic illnesses with severe exacerbation or side effects of treatment and/or 1 acute or chronic illness that poses threat to life or bodily function.    Decision regarding hospitalization or escalation  Patient on drug therapy that requires intensive monitoring.      DVT Prophylaxis: Lovenox  Diet: ADULT DIET; Regular; 4 carb choices (60 gm/meal); Low Potassium (Less than 3000 mg/day); Low Fiber  ADULT ORAL NUTRITION SUPPLEMENT; Breakfast, Lunch, Dinner; Diabetic Oral Supplement  Code Status: Full Code  PT/OT Eval Status: patient ambulatory at home    Dispo - Home when renal function improves.    Appropriate for A1 Discharge Unit: Yes      MAXWELL MONTE MD

## 2023-03-04 NOTE — PROGRESS NOTES
Pt awake a/o x4 verbal and able to make needs known, Requested pain meds for c/o abd pain/back pain. See flow sheet for details. Call light within reach will cont to monitor.

## 2023-03-04 NOTE — PROGRESS NOTES
Pt c/o difficultly breathing. RR 16, o2sat 100% on room air and no s/s of resp distress observed. Pt medicated with prn Clonazepam and MD aware of complaints. New orders received see flow sheet. No further complaints noted.

## 2023-03-04 NOTE — PROGRESS NOTES
GI Progress Note      SUBJECTIVE:  Pt c/o increasing epigastric pain exacerbated by PO intake. No BMs for several days. Denies nausea or emesis. OBJECTIVE      Medications    Current Facility-Administered Medications: insulin NPH (HumuLIN N;NovoLIN N) injection vial 4 Units, 4 Units, SubCUTAneous, Nightly  insulin NPH (HumuLIN N;NovoLIN N) injection vial 8 Units, 8 Units, SubCUTAneous, QAM  insulin regular (HUMULIN R;NOVOLIN R) injection 10 Units, 10 Units, SubCUTAneous, Once  hydrALAZINE (APRESOLINE) injection 5 mg, 5 mg, IntraVENous, Q4H PRN  oxyCODONE-acetaminophen (PERCOCET) 7.5-325 MG per tablet 1 tablet, 1 tablet, Oral, Q8H PRN  insulin lispro (HUMALOG) injection vial 0-4 Units, 0-4 Units, SubCUTAneous, TID WC  insulin lispro (HUMALOG) injection vial 0-4 Units, 0-4 Units, SubCUTAneous, Nightly  glucose chewable tablet 16 g, 4 tablet, Oral, PRN  dextrose bolus 10% 125 mL, 125 mL, IntraVENous, PRN **OR** dextrose bolus 10% 250 mL, 250 mL, IntraVENous, PRN  glucagon (rDNA) injection 1 mg, 1 mg, IntraMUSCular, PRN  dextrose 10 % infusion, , IntraVENous, Continuous PRN  polyethylene glycol (GLYCOLAX) packet 17 g, 17 g, Oral, Daily  ticagrelor (BRILINTA) tablet 90 mg, 90 mg, Oral, BID  aspirin EC tablet 81 mg, 81 mg, Oral, Daily  atorvastatin (LIPITOR) tablet 20 mg, 20 mg, Oral, Daily  clonazePAM (KLONOPIN) tablet 0.5 mg, 0.5 mg, Oral, TID PRN  calcium carb-cholecalciferol 250-3. 125 MG-MCG per tab 1 tablet, 1 tablet, Oral, Daily  gabapentin (NEURONTIN) capsule 600 mg, 600 mg, Oral, TID  [Held by provider] lisinopril (PRINIVIL;ZESTRIL) tablet 40 mg, 40 mg, Oral, Daily  paliperidone (INVEGA) extended release tablet 9 mg, 9 mg, Oral, QAM  pantoprazole (PROTONIX) tablet 40 mg, 40 mg, Oral, Daily  traZODone (DESYREL) tablet 200 mg, 200 mg, Oral, Nightly  sodium chloride flush 0.9 % injection 5-40 mL, 5-40 mL, IntraVENous, 2 times per day  sodium chloride flush 0.9 % injection 5-40 mL, 5-40 mL, IntraVENous, PRN  0.9 % sodium chloride infusion, , IntraVENous, PRN  enoxaparin (LOVENOX) injection 40 mg, 40 mg, SubCUTAneous, Daily  ondansetron (ZOFRAN-ODT) disintegrating tablet 4 mg, 4 mg, Oral, Q8H PRN **OR** ondansetron (ZOFRAN) injection 4 mg, 4 mg, IntraVENous, Q6H PRN  polyethylene glycol (GLYCOLAX) packet 17 g, 17 g, Oral, Daily PRN  acetaminophen (TYLENOL) tablet 650 mg, 650 mg, Oral, Q6H PRN **OR** acetaminophen (TYLENOL) suppository 650 mg, 650 mg, Rectal, Q6H PRN  Physical    VITALS:  BP (!) 163/65   Pulse 78   Temp 97.8 °F (36.6 °C) (Oral)   Resp 16   Ht 5' 3\" (1.6 m)   Wt 121 lb 8 oz (55.1 kg)   SpO2 98%   BMI 21.52 kg/m²   ABD: soft with mild epigastric tenderness, NABs, mild non tympanic distention  Data    Path pending    ASSESSMENT AND PLAN  59 yo F with h/o DM, HTN, CAD and bipolar DO who has diarrhea, anemia, weight loss and abnormal PET scan of the colon in the setting of an elevated CEA. Admission was prompted due to hypoglycemia following bowel prep for outpatient colonoscopy. Underwent inpatient colonoscopy (2/27)with findings of narrowing at around the proximal transverse colon or hepatic flexure. Path pending. Unable to traverse with scope and poor prep prevented optimal evaluation. CT (3/2) demonstrated a large amount of stool throughout the colon. No stricture noted. Will change from MiraLax to twice daily Lactulose as we continue to await biopsy results.

## 2023-03-04 NOTE — PLAN OF CARE
Problem: Discharge Planning  Goal: Discharge to home or other facility with appropriate resources  Outcome: Progressing     Problem: Pain  Goal: Verbalizes/displays adequate comfort level or baseline comfort level  Outcome: Progressing     Problem: Chronic Conditions and Co-morbidities  Goal: Patient's chronic conditions and co-morbidity symptoms are monitored and maintained or improved  Outcome: Progressing     Problem: Gastrointestinal - Adult  Goal: Minimal or absence of nausea and vomiting  Outcome: Progressing  Goal: Maintains or returns to baseline bowel function  Outcome: Progressing  Goal: Maintains adequate nutritional intake  Outcome: Progressing     Problem: Metabolic/Fluid and Electrolytes - Adult  Goal: Glucose maintained within prescribed range  Outcome: Progressing  Goal: Electrolytes maintained within normal limits  Outcome: Progressing     Problem: Safety - Adult  Goal: Free from fall injury  Outcome: Progressing     Problem: Nutrition Deficit:  Goal: Optimize nutritional status  Outcome: Progressing

## 2023-03-04 NOTE — PLAN OF CARE
Problem: Gastrointestinal - Adult  Goal: Minimal or absence of nausea and vomiting  3/3/2023 2202 by Aleta Chiu RN  Outcome: Progressing  Flowsheets (Taken 3/3/2023 2202)  Minimal or absence of nausea and vomiting:   Administer IV fluids as ordered to ensure adequate hydration   Maintain NPO status until nausea and vomiting are resolved   Administer ordered antiemetic medications as needed     Problem: Metabolic/Fluid and Electrolytes - Adult  Goal: Glucose maintained within prescribed range  3/3/2023 2202 by Aleta Chiu RN  Outcome: Progressing  Flowsheets (Taken 3/3/2023 2202)  Glucose maintained within prescribed range:   Monitor blood glucose as ordered   Assess for signs and symptoms of hyperglycemia and hypoglycemia   Administer ordered medications to maintain glucose within target range   Assess barriers to adequate nutritional intake and initiate nutrition consult as needed

## 2023-03-05 LAB
ALBUMIN SERPL-MCNC: 2.9 G/DL (ref 3.4–5)
ANION GAP SERPL CALCULATED.3IONS-SCNC: 4 MMOL/L (ref 3–16)
BUN BLDV-MCNC: 14 MG/DL (ref 7–20)
CALCIUM SERPL-MCNC: 9 MG/DL (ref 8.3–10.6)
CHLORIDE BLD-SCNC: 105 MMOL/L (ref 99–110)
CO2: 30 MMOL/L (ref 21–32)
CREAT SERPL-MCNC: 1.1 MG/DL (ref 0.6–1.2)
GFR SERPL CREATININE-BSD FRML MDRD: 56 ML/MIN/{1.73_M2}
GLUCOSE BLD-MCNC: 126 MG/DL (ref 70–99)
GLUCOSE BLD-MCNC: 139 MG/DL (ref 70–99)
GLUCOSE BLD-MCNC: 166 MG/DL (ref 70–99)
GLUCOSE BLD-MCNC: 184 MG/DL (ref 70–99)
GLUCOSE BLD-MCNC: 264 MG/DL (ref 70–99)
GLUCOSE BLD-MCNC: 296 MG/DL (ref 70–99)
GLUCOSE BLD-MCNC: 410 MG/DL (ref 70–99)
PERFORMED ON: ABNORMAL
PHOSPHORUS: 4.3 MG/DL (ref 2.5–4.9)
POTASSIUM SERPL-SCNC: 4.3 MMOL/L (ref 3.5–5.1)
SODIUM BLD-SCNC: 139 MMOL/L (ref 136–145)

## 2023-03-05 PROCEDURE — 36415 COLL VENOUS BLD VENIPUNCTURE: CPT

## 2023-03-05 PROCEDURE — 6360000002 HC RX W HCPCS: Performed by: INTERNAL MEDICINE

## 2023-03-05 PROCEDURE — 1200000000 HC SEMI PRIVATE

## 2023-03-05 PROCEDURE — 6370000000 HC RX 637 (ALT 250 FOR IP): Performed by: INTERNAL MEDICINE

## 2023-03-05 PROCEDURE — 80069 RENAL FUNCTION PANEL: CPT

## 2023-03-05 PROCEDURE — 2580000003 HC RX 258: Performed by: INTERNAL MEDICINE

## 2023-03-05 RX ORDER — AMLODIPINE BESYLATE 5 MG/1
5 TABLET ORAL DAILY
Status: DISCONTINUED | OUTPATIENT
Start: 2023-03-05 | End: 2023-03-09

## 2023-03-05 RX ORDER — LACTULOSE 10 G/15ML
30 SOLUTION ORAL 2 TIMES DAILY
Status: DISCONTINUED | OUTPATIENT
Start: 2023-03-05 | End: 2023-03-10

## 2023-03-05 RX ADMIN — Medication 1 TABLET: at 10:33

## 2023-03-05 RX ADMIN — PALIPERIDONE 9 MG: 3 TABLET, EXTENDED RELEASE ORAL at 10:30

## 2023-03-05 RX ADMIN — ASPIRIN 81 MG: 81 TABLET, COATED ORAL at 10:30

## 2023-03-05 RX ADMIN — TRAZODONE HYDROCHLORIDE 200 MG: 50 TABLET ORAL at 20:47

## 2023-03-05 RX ADMIN — INSULIN HUMAN 4 UNITS: 100 INJECTION, SUSPENSION SUBCUTANEOUS at 12:34

## 2023-03-05 RX ADMIN — TICAGRELOR 90 MG: 90 TABLET ORAL at 10:29

## 2023-03-05 RX ADMIN — TICAGRELOR 90 MG: 90 TABLET ORAL at 20:47

## 2023-03-05 RX ADMIN — AMLODIPINE BESYLATE 5 MG: 5 TABLET ORAL at 10:32

## 2023-03-05 RX ADMIN — INSULIN LISPRO 8 UNITS: 100 INJECTION, SOLUTION INTRAVENOUS; SUBCUTANEOUS at 12:29

## 2023-03-05 RX ADMIN — GABAPENTIN 600 MG: 300 CAPSULE ORAL at 10:31

## 2023-03-05 RX ADMIN — ENOXAPARIN SODIUM 40 MG: 100 INJECTION SUBCUTANEOUS at 10:34

## 2023-03-05 RX ADMIN — INSULIN LISPRO 4 UNITS: 100 INJECTION, SOLUTION INTRAVENOUS; SUBCUTANEOUS at 18:13

## 2023-03-05 RX ADMIN — SODIUM CHLORIDE, PRESERVATIVE FREE 10 ML: 5 INJECTION INTRAVENOUS at 20:47

## 2023-03-05 RX ADMIN — GABAPENTIN 600 MG: 300 CAPSULE ORAL at 20:47

## 2023-03-05 RX ADMIN — ATORVASTATIN CALCIUM 20 MG: 10 TABLET, FILM COATED ORAL at 10:33

## 2023-03-05 RX ADMIN — SODIUM CHLORIDE, PRESERVATIVE FREE 10 ML: 5 INJECTION INTRAVENOUS at 10:36

## 2023-03-05 RX ADMIN — PANTOPRAZOLE SODIUM 40 MG: 40 TABLET, DELAYED RELEASE ORAL at 10:32

## 2023-03-05 RX ADMIN — LACTULOSE 30 G: 10 SOLUTION ORAL at 10:27

## 2023-03-05 RX ADMIN — ACETAMINOPHEN 325MG 650 MG: 325 TABLET ORAL at 16:44

## 2023-03-05 RX ADMIN — LACTULOSE 30 G: 10 SOLUTION ORAL at 20:53

## 2023-03-05 RX ADMIN — OXYCODONE AND ACETAMINOPHEN 1 TABLET: 7.5; 325 TABLET ORAL at 10:30

## 2023-03-05 RX ADMIN — GABAPENTIN 600 MG: 300 CAPSULE ORAL at 15:01

## 2023-03-05 RX ADMIN — OXYCODONE AND ACETAMINOPHEN 1 TABLET: 7.5; 325 TABLET ORAL at 20:47

## 2023-03-05 ASSESSMENT — PAIN SCALES - GENERAL
PAINLEVEL_OUTOF10: 8
PAINLEVEL_OUTOF10: 10
PAINLEVEL_OUTOF10: 10

## 2023-03-05 ASSESSMENT — PAIN DESCRIPTION - ORIENTATION: ORIENTATION: MID

## 2023-03-05 ASSESSMENT — PAIN DESCRIPTION - DESCRIPTORS: DESCRIPTORS: ACHING

## 2023-03-05 ASSESSMENT — PAIN DESCRIPTION - LOCATION
LOCATION: HEAD
LOCATION: BACK

## 2023-03-05 NOTE — PLAN OF CARE
Problem: Discharge Planning  Goal: Discharge to home or other facility with appropriate resources  3/5/2023 0121 by Ildefonso Nettles RN  Outcome: Progressing  3/4/2023 1610 by Bernadine Morales RN  Outcome: Progressing     Problem: Pain  Goal: Verbalizes/displays adequate comfort level or baseline comfort level  3/5/2023 0121 by Ildefonso Nettles RN  Outcome: Progressing  3/4/2023 1610 by Bernadine Morales RN  Outcome: Progressing     Problem: Chronic Conditions and Co-morbidities  Goal: Patient's chronic conditions and co-morbidity symptoms are monitored and maintained or improved  3/5/2023 0121 by Ildefonso Nettles RN  Outcome: Progressing  Flowsheets (Taken 3/4/2023 2021)  Care Plan - Patient's Chronic Conditions and Co-Morbidity Symptoms are Monitored and Maintained or Improved: Monitor and assess patient's chronic conditions and comorbid symptoms for stability, deterioration, or improvement  3/4/2023 1610 by Bernadine Morales RN  Outcome: Progressing     Problem: Gastrointestinal - Adult  Goal: Minimal or absence of nausea and vomiting  3/5/2023 0121 by Ildefonso Nettles RN  Outcome: Progressing  Flowsheets (Taken 3/4/2023 2021)  Minimal or absence of nausea and vomiting: Provide nonpharmacologic comfort measures as appropriate  3/4/2023 1610 by Bernadine Morales RN  Outcome: Progressing  Goal: Maintains or returns to baseline bowel function  3/5/2023 0121 by Ildefonso Nettles RN  Outcome: Progressing  Flowsheets (Taken 3/4/2023 2021)  Maintains or returns to baseline bowel function: Assess bowel function  3/4/2023 1610 by Bernadine Morales RN  Outcome: Progressing  Goal: Maintains adequate nutritional intake  3/5/2023 0121 by Ildefonso Nettles RN  Outcome: Progressing  3/4/2023 1610 by Bernadine Morales RN  Outcome: Progressing     Problem: Metabolic/Fluid and Electrolytes - Adult  Goal: Glucose maintained within prescribed range  3/5/2023 0121 by Ildefonso Nettles RN  Outcome: Progressing  3/4/2023 1610 by Bernadine Morales RN  Outcome: Progressing  Goal: Electrolytes maintained within normal limits  3/5/2023 0121 by Abi Montiel RN  Outcome: Progressing  3/4/2023 1610 by Polly Watson RN  Outcome: Progressing     Problem: Safety - Adult  Goal: Free from fall injury  3/5/2023 0121 by Abi Montiel RN  Outcome: Progressing  3/4/2023 1610 by Polly Watson RN  Outcome: Progressing     Problem: Nutrition Deficit:  Goal: Optimize nutritional status  3/5/2023 0121 by Abi Montiel RN  Outcome: Progressing  3/4/2023 1610 by Polly Watson RN  Outcome: Progressing     Problem: Cardiovascular - Adult  Goal: Maintains optimal cardiac output and hemodynamic stability  Outcome: Progressing  Goal: Absence of cardiac dysrhythmias or at baseline  Outcome: Progressing     Problem: Hematologic - Adult  Goal: Maintains hematologic stability  Outcome: Progressing

## 2023-03-05 NOTE — PROGRESS NOTES
Hospitalist Progress Note      PCP: Anusha Collier    Date of Admission: 2/26/2023    Chief Complaint:   Hypoglycemia     Subjective:    Patient states she had a good BM this AM.  No nausea vomiting. No fevers or chills. Home blood sugars remain labile. Medications:  Reviewed    Infusion Medications    dextrose      sodium chloride       Scheduled Medications    lactulose  30 g Oral BID    amLODIPine  5 mg Oral Daily    [START ON 3/6/2023] insulin NPH  14 Units SubCUTAneous QAM    insulin lispro  0-8 Units SubCUTAneous TID WC    insulin lispro  0-4 Units SubCUTAneous Nightly    insulin NPH  6 Units SubCUTAneous Nightly    insulin regular  10 Units SubCUTAneous Once    ticagrelor  90 mg Oral BID    aspirin  81 mg Oral Daily    atorvastatin  20 mg Oral Daily    calcium carb-cholecalciferol  1 tablet Oral Daily    gabapentin  600 mg Oral TID    lisinopril  40 mg Oral Daily    paliperidone  9 mg Oral QAM    pantoprazole  40 mg Oral Daily    traZODone  200 mg Oral Nightly    sodium chloride flush  5-40 mL IntraVENous 2 times per day    enoxaparin  40 mg SubCUTAneous Daily     PRN Meds: hydrALAZINE, oxyCODONE-acetaminophen, glucose, dextrose bolus **OR** dextrose bolus, glucagon (rDNA), dextrose, clonazePAM, sodium chloride flush, sodium chloride, ondansetron **OR** ondansetron, polyethylene glycol, acetaminophen **OR** acetaminophen      Intake/Output Summary (Last 24 hours) at 3/5/2023 1247  Last data filed at 3/5/2023 1002  Gross per 24 hour   Intake 555 ml   Output --   Net 555 ml         Physical Exam Performed:    BP (!) 165/75   Pulse 60   Temp 98.2 °F (36.8 °C) (Oral)   Resp 16   Ht 5' 3\" (1.6 m)   Wt 121 lb 8 oz (55.1 kg)   SpO2 100%   BMI 21.52 kg/m²     General appearance: No apparent distress, appears stated age and cooperative. HEENT: Pupils equal, round, and reactive to light. Conjunctivae/corneas clear. Neck: Supple, with full range of motion. No jugular venous distention.  Trachea midline.  Respiratory:  Normal respiratory effort. Clear to auscultation, bilaterally without Rales/Wheezes/Rhonchi.  Cardiovascular: Regular rate and rhythm with normal S1/S2 without murmurs, rubs or gallops.  Abdomen: Soft nontender.  Positive bowel sounds.  Musculoskeletal: No clubbing, cyanosis or edema bilaterally.  Full range of motion without deformity.  Skin: Skin color, texture, turgor normal.  No rashes or lesions.  Neurologic:  Neurovascularly intact without any focal sensory/motor deficits. Cranial nerves: II-XII intact, grossly non-focal.  Psychiatric: Alert and oriented x3.    Capillary Refill: Brisk, 3 seconds, normal   Peripheral Pulses: +2 palpable, equal bilaterally       Labs:   Recent Labs     03/04/23  1034   WBC 5.0   HGB 9.2*   HCT 28.7*          Recent Labs     03/04/23  1012 03/05/23  0618   * 139   K 4.5 4.3   CL 96* 105   CO2 26 30   BUN 11 14   CREATININE 1.0 1.1   CALCIUM 9.1 9.0   PHOS  --  4.3       No results for input(s): AST, ALT, BILIDIR, BILITOT, ALKPHOS in the last 72 hours.    No results for input(s): INR in the last 72 hours.  Recent Labs     03/04/23  1039   TROPONINI <0.01         Urinalysis:      Lab Results   Component Value Date/Time    NITRU Negative 12/10/2022 01:42 PM    WBCUA 3-5 12/10/2022 01:42 PM    BACTERIA 2+ 12/10/2022 01:42 PM    RBCUA 5-10 12/10/2022 01:42 PM    BLOODU MODERATE 12/10/2022 01:42 PM    SPECGRAV >=1.030 12/10/2022 01:42 PM    GLUCOSEU >=1000 12/10/2022 01:42 PM    GLUCOSEU >=1000 mg/dL 06/07/2010 03:38 PM       Radiology:  XR CHEST PORTABLE   Preliminary Result   No evidence of edema or pneumonia.      Mild cardiac silhouette enlargement.         CT ABDOMEN PELVIS WO CONTRAST Additional Contrast? None   Final Result   1.  No acute abdominal or pelvic findings.      2.  Large amount of formed stool throughout the colon.  Correlate with any   evidence of constipation.         XR ABDOMEN (KUB) (SINGLE AP VIEW)   Final Result   1.  Moderate stool in the colon, correlate with signs of constipation. US RETROPERITONEAL COMPLETE   Final Result   Left kidney is unremarkable         CT HEAD WO CONTRAST   Final Result   No acute intracranial abnormality. Mild cerebral white matter chronic microvascular ischemic disease. Chronic right maxillary sinus opacification in setting of chronic sinusitis. 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 Patient was apparently found unresponsive on the toilet while doing her bowel prep for a colonoscopy procedure scheduled tomorrow with [facility]. She also supposed to get an EGD. Patient reports she remembers getting diaphoretic and weak just prior to using the bathroom. And after she sat on the toilet she became very lightheaded and lost her consciousness. She reports she takes 22 units of Lantus in the morning which she did. Was instructed not to eat anything so she drank some water and Gatorade in the morning. She also proceeded to take 8 units of the short acting in the morning. Patient reports she is not aware that she is not supposed to take the short acting insulin with a meal if she was not going to eat anything for the meal.  She has been on insulin and a diabetic for about 10 years. EMS was called, and they reported her blood sugar was 40 at the scene. \"      1.   Hypoglycemia due to insulin use with decreased p.o. intake. Blood sugars are labile. Patient switched to NPH. Sliding scale increased. Will monitor closely. NPH adjusted for elevated blood sugars during day. 2.  SHARRON with acidosis. Patient was on a bicarb drip. We will discontinue drip and recheck BMP in afternoon. 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 Continue to monitor renal function. Creatinine 1.0  3. Weight loss. Patient with abnormal CEA. Patient will follow with GI for abnormal C-scope. CT scan did show constipation. GI following. Patient having bowel movements and has improved. Awaiting biopsy report. 4.  Hypertension. Blood pressure labile. Will restart lisinopril. Amlodipine started. Patient with 1 or more chronic illnesses with severe exacerbation or side effects of treatment and/or 1 acute or chronic illness that poses threat to life or bodily function. Decision regarding hospitalization or escalation  Patient on drug therapy that requires intensive monitoring. DVT Prophylaxis: Lovenox  Diet: ADULT DIET; Regular; 4 carb choices (60 gm/meal); Low Potassium (Less than 3000 mg/day); Low Fiber  ADULT ORAL NUTRITION SUPPLEMENT; Breakfast, Lunch, Dinner; Diabetic Oral Supplement  Code Status: Full Code  PT/OT Eval Status: patient ambulatory at home    [address] [place] [address] when renal function improves.     Appropriate for A1 Discharge Unit: Yes      Kevin Alvarenga MD

## 2023-03-05 NOTE — PROGRESS NOTES
GI Progress Note      SUBJECTIVE:  Feels better today. Denies nausea, emesis. Having BMs. Abd discomfort improved. Tolerating oral intake. OBJECTIVE      Medications    Current Facility-Administered Medications: lactulose (CHRONULAC) 10 GM/15ML solution 30 g, 30 g, Oral, BID  amLODIPine (NORVASC) tablet 5 mg, 5 mg, Oral, Daily  [START ON 3/6/2023] insulin NPH (HumuLIN N;NovoLIN N) injection vial 14 Units, 14 Units, SubCUTAneous, QAM  insulin lispro (HUMALOG) injection vial 0-8 Units, 0-8 Units, SubCUTAneous, TID WC  insulin lispro (HUMALOG) injection vial 0-4 Units, 0-4 Units, SubCUTAneous, Nightly  insulin NPH (HumuLIN N;NovoLIN N) injection vial 6 Units, 6 Units, SubCUTAneous, Nightly  insulin regular (HUMULIN R;NOVOLIN R) injection 10 Units, 10 Units, SubCUTAneous, Once  hydrALAZINE (APRESOLINE) injection 5 mg, 5 mg, IntraVENous, Q4H PRN  oxyCODONE-acetaminophen (PERCOCET) 7.5-325 MG per tablet 1 tablet, 1 tablet, Oral, Q8H PRN  glucose chewable tablet 16 g, 4 tablet, Oral, PRN  dextrose bolus 10% 125 mL, 125 mL, IntraVENous, PRN **OR** dextrose bolus 10% 250 mL, 250 mL, IntraVENous, PRN  glucagon (rDNA) injection 1 mg, 1 mg, IntraMUSCular, PRN  dextrose 10 % infusion, , IntraVENous, Continuous PRN  ticagrelor (BRILINTA) tablet 90 mg, 90 mg, Oral, BID  aspirin EC tablet 81 mg, 81 mg, Oral, Daily  atorvastatin (LIPITOR) tablet 20 mg, 20 mg, Oral, Daily  clonazePAM (KLONOPIN) tablet 0.5 mg, 0.5 mg, Oral, TID PRN  calcium carb-cholecalciferol 250-3. 125 MG-MCG per tab 1 tablet, 1 tablet, Oral, Daily  gabapentin (NEURONTIN) capsule 600 mg, 600 mg, Oral, TID  lisinopril (PRINIVIL;ZESTRIL) tablet 40 mg, 40 mg, Oral, Daily  paliperidone (INVEGA) extended release tablet 9 mg, 9 mg, Oral, QAM  pantoprazole (PROTONIX) tablet 40 mg, 40 mg, Oral, Daily  traZODone (DESYREL) tablet 200 mg, 200 mg, Oral, Nightly  sodium chloride flush 0.9 % injection 5-40 mL, 5-40 mL, IntraVENous, 2 times per day  sodium chloride flush 0.9 % injection 5-40 mL, 5-40 mL, IntraVENous, PRN  0.9 % sodium chloride infusion, , IntraVENous, PRN  enoxaparin (LOVENOX) injection 40 mg, 40 mg, SubCUTAneous, Daily  ondansetron (ZOFRAN-ODT) disintegrating tablet 4 mg, 4 mg, Oral, Q8H PRN **OR** ondansetron (ZOFRAN) injection 4 mg, 4 mg, IntraVENous, Q6H PRN  polyethylene glycol (GLYCOLAX) packet 17 g, 17 g, Oral, Daily PRN  acetaminophen (TYLENOL) tablet 650 mg, 650 mg, Oral, Q6H PRN **OR** acetaminophen (TYLENOL) suppository 650 mg, 650 mg, Rectal, Q6H PRN  Physical    VITALS:  BP (!) 165/75   Pulse 60   Temp 98.2 °F (36.8 °C) (Oral)   Resp 16   Ht 5' 3\" (1.6 m)   Wt 121 lb 8 oz (55.1 kg)   SpO2 100%   BMI 21.52 kg/m²   ABD: soft with mild epigastric tenderness, NABs, ND  Data    CBC with Differential:    Lab Results   Component Value Date/Time    WBC 5.0 03/04/2023 10:34 AM    RBC 3.24 03/04/2023 10:34 AM    HGB 9.2 03/04/2023 10:34 AM    HCT 28.7 03/04/2023 10:34 AM     03/04/2023 10:34 AM    MCV 88.5 03/04/2023 10:34 AM    MCH 28.4 03/04/2023 10:34 AM    MCHC 32.1 03/04/2023 10:34 AM    RDW 13.9 03/04/2023 10:34 AM    SEGSPCT 71.2 05/29/2013 01:53 PM    BANDSPCT 2 02/05/2021 06:07 AM    LYMPHOPCT 34.1 02/28/2023 03:38 AM    MONOPCT 8.4 02/28/2023 03:38 AM    EOSPCT 0.4 09/05/2011 11:35 PM    BASOPCT 0.3 02/28/2023 03:38 AM    MONOSABS 0.4 02/28/2023 03:38 AM    LYMPHSABS 1.5 02/28/2023 03:38 AM    EOSABS 0.0 02/28/2023 03:38 AM    BASOSABS 0.0 02/28/2023 03:38 AM    DIFFTYPE Auto 05/29/2013 01:53 PM       ASSESSMENT AND PLAN  59 yo F with h/o DM, HTN, CAD and bipolar DO who has diarrhea, anemia, weight loss and abnormal PET scan of the colon in the setting of an elevated CEA. Admission was prompted due to hypoglycemia following bowel prep for outpatient colonoscopy. Underwent inpatient colonoscopy (2/27)with findings of narrowing at around the proximal transverse colon or hepatic flexure. Path pending.  Unable to traverse with scope and poor prep prevented optimal evaluation. CT (3/2) demonstrated a large amount of stool throughout the colon. No stricture noted. Lactulose has improved the passage of stool and associated sxs.

## 2023-03-06 LAB
GLUCOSE BLD-MCNC: 148 MG/DL (ref 70–99)
GLUCOSE BLD-MCNC: 189 MG/DL (ref 70–99)
GLUCOSE BLD-MCNC: 221 MG/DL (ref 70–99)
GLUCOSE BLD-MCNC: 319 MG/DL (ref 70–99)
GLUCOSE BLD-MCNC: 325 MG/DL (ref 70–99)
GLUCOSE BLD-MCNC: 367 MG/DL (ref 70–99)
PERFORMED ON: ABNORMAL

## 2023-03-06 PROCEDURE — 6370000000 HC RX 637 (ALT 250 FOR IP): Performed by: INTERNAL MEDICINE

## 2023-03-06 PROCEDURE — 1200000000 HC SEMI PRIVATE

## 2023-03-06 PROCEDURE — 6370000000 HC RX 637 (ALT 250 FOR IP): Performed by: NURSE PRACTITIONER

## 2023-03-06 PROCEDURE — 6360000002 HC RX W HCPCS: Performed by: INTERNAL MEDICINE

## 2023-03-06 PROCEDURE — 2580000003 HC RX 258: Performed by: INTERNAL MEDICINE

## 2023-03-06 RX ADMIN — GABAPENTIN 600 MG: 300 CAPSULE ORAL at 08:37

## 2023-03-06 RX ADMIN — POLYETHYLENE GLYCOL-3350 AND ELECTROLYTES 2000 ML: 236; 6.74; 5.86; 2.97; 22.74 POWDER, FOR SOLUTION ORAL at 14:10

## 2023-03-06 RX ADMIN — INSULIN HUMAN 5 UNITS: 100 INJECTION, SOLUTION PARENTERAL at 01:47

## 2023-03-06 RX ADMIN — TRAZODONE HYDROCHLORIDE 200 MG: 50 TABLET ORAL at 20:34

## 2023-03-06 RX ADMIN — TICAGRELOR 90 MG: 90 TABLET ORAL at 08:37

## 2023-03-06 RX ADMIN — ASPIRIN 81 MG: 81 TABLET, COATED ORAL at 08:37

## 2023-03-06 RX ADMIN — AMLODIPINE BESYLATE 5 MG: 5 TABLET ORAL at 08:37

## 2023-03-06 RX ADMIN — CLONAZEPAM 0.5 MG: 0.5 TABLET ORAL at 16:44

## 2023-03-06 RX ADMIN — Medication 1 TABLET: at 08:40

## 2023-03-06 RX ADMIN — LISINOPRIL 40 MG: 20 TABLET ORAL at 08:37

## 2023-03-06 RX ADMIN — GABAPENTIN 600 MG: 300 CAPSULE ORAL at 14:10

## 2023-03-06 RX ADMIN — TICAGRELOR 90 MG: 90 TABLET ORAL at 20:34

## 2023-03-06 RX ADMIN — OXYCODONE AND ACETAMINOPHEN 1 TABLET: 7.5; 325 TABLET ORAL at 07:05

## 2023-03-06 RX ADMIN — PALIPERIDONE 9 MG: 3 TABLET, EXTENDED RELEASE ORAL at 08:37

## 2023-03-06 RX ADMIN — OXYCODONE AND ACETAMINOPHEN 1 TABLET: 7.5; 325 TABLET ORAL at 20:34

## 2023-03-06 RX ADMIN — GABAPENTIN 600 MG: 300 CAPSULE ORAL at 20:34

## 2023-03-06 RX ADMIN — SODIUM CHLORIDE, PRESERVATIVE FREE 10 ML: 5 INJECTION INTRAVENOUS at 08:38

## 2023-03-06 RX ADMIN — INSULIN LISPRO 6 UNITS: 100 INJECTION, SOLUTION INTRAVENOUS; SUBCUTANEOUS at 12:39

## 2023-03-06 RX ADMIN — INSULIN LISPRO 4 UNITS: 100 INJECTION, SOLUTION INTRAVENOUS; SUBCUTANEOUS at 20:40

## 2023-03-06 RX ADMIN — INSULIN HUMAN 14 UNITS: 100 INJECTION, SUSPENSION SUBCUTANEOUS at 08:37

## 2023-03-06 RX ADMIN — ATORVASTATIN CALCIUM 20 MG: 10 TABLET, FILM COATED ORAL at 08:38

## 2023-03-06 RX ADMIN — PANTOPRAZOLE SODIUM 40 MG: 40 TABLET, DELAYED RELEASE ORAL at 08:38

## 2023-03-06 RX ADMIN — LACTULOSE 30 G: 10 SOLUTION ORAL at 08:51

## 2023-03-06 RX ADMIN — ENOXAPARIN SODIUM 40 MG: 100 INJECTION SUBCUTANEOUS at 08:37

## 2023-03-06 ASSESSMENT — PAIN SCALES - GENERAL: PAINLEVEL_OUTOF10: 7

## 2023-03-06 NOTE — PROGRESS NOTES
Hospitalist Progress Note      PCP: Lissett Harper    Date of Admission: 2/26/2023    Chief Complaint:   Hypoglycemia     Subjective:    BS labile. Minimal appetite. Ongoing abdominal pain/cramping. No BMs today. Medications:  Reviewed    Infusion Medications    dextrose      sodium chloride       Scheduled Medications    lactulose  30 g Oral BID    amLODIPine  5 mg Oral Daily    insulin NPH  14 Units SubCUTAneous QAM    insulin lispro  0-8 Units SubCUTAneous TID WC    insulin lispro  0-4 Units SubCUTAneous Nightly    insulin NPH  6 Units SubCUTAneous Nightly    insulin regular  10 Units SubCUTAneous Once    ticagrelor  90 mg Oral BID    aspirin  81 mg Oral Daily    atorvastatin  20 mg Oral Daily    calcium carb-cholecalciferol  1 tablet Oral Daily    gabapentin  600 mg Oral TID    lisinopril  40 mg Oral Daily    paliperidone  9 mg Oral QAM    pantoprazole  40 mg Oral Daily    traZODone  200 mg Oral Nightly    sodium chloride flush  5-40 mL IntraVENous 2 times per day    enoxaparin  40 mg SubCUTAneous Daily     PRN Meds: hydrALAZINE, oxyCODONE-acetaminophen, glucose, dextrose bolus **OR** dextrose bolus, glucagon (rDNA), dextrose, clonazePAM, sodium chloride flush, sodium chloride, ondansetron **OR** ondansetron, polyethylene glycol, acetaminophen **OR** acetaminophen      Intake/Output Summary (Last 24 hours) at 3/6/2023 0956  Last data filed at 3/5/2023 2000  Gross per 24 hour   Intake 960 ml   Output --   Net 960 ml         Physical Exam Performed:    BP (!) 158/72   Pulse 53   Temp 98.1 °F (36.7 °C) (Oral)   Resp 18   Ht 5' 3\" (1.6 m)   Wt 121 lb 8 oz (55.1 kg)   SpO2 100%   BMI 21.52 kg/m²     General appearance: No apparent distress, appears stated age and cooperative. HEENT: Pupils equal, round, and reactive to light. Conjunctivae/corneas clear. Neck: Supple, with full range of motion. No jugular venous distention. Trachea midline. Respiratory:  Normal respiratory effort.  Clear to auscultation, bilaterally without Rales/Wheezes/Rhonchi. Cardiovascular: Regular rate and rhythm with normal S1/S2 without murmurs, rubs or gallops. Abdomen: Soft nontender. Positive bowel sounds. Musculoskeletal: No clubbing, cyanosis or edema bilaterally. Full range of motion without deformity. Skin: Skin color, texture, turgor normal.  No rashes or lesions. Neurologic:  Neurovascularly intact without any focal sensory/motor deficits. Cranial nerves: II-XII intact, grossly non-focal.  Psychiatric: Alert and oriented x3. Capillary Refill: Brisk, 3 seconds, normal   Peripheral Pulses: +2 palpable, equal bilaterally       Labs:   Recent Labs     03/04/23  1034   WBC 5.0   HGB 9.2*   HCT 28.7*          Recent Labs     03/04/23  1012 03/05/23  0618   * 139   K 4.5 4.3   CL 96* 105   CO2 26 30   BUN 11 14   CREATININE 1.0 1.1   CALCIUM 9.1 9.0   PHOS  --  4.3       No results for input(s): AST, ALT, BILIDIR, BILITOT, ALKPHOS in the last 72 hours. No results for input(s): INR in the last 72 hours. Recent Labs     03/04/23  1039   TROPONINI <0.01         Urinalysis:      Lab Results   Component Value Date/Time    NITRU Negative 12/10/2022 01:42 PM    WBCUA 3-5 12/10/2022 01:42 PM    BACTERIA 2+ 12/10/2022 01:42 PM    RBCUA 5-10 12/10/2022 01:42 PM    BLOODU MODERATE 12/10/2022 01:42 PM    SPECGRAV >=1.030 12/10/2022 01:42 PM    GLUCOSEU >=1000 12/10/2022 01:42 PM    GLUCOSEU >=1000 mg/dL 06/07/2010 03:38 PM       Radiology:  XR CHEST PORTABLE   Final Result   No evidence of edema or pneumonia. Mild cardiac silhouette enlargement. CT ABDOMEN PELVIS WO CONTRAST Additional Contrast? None   Final Result   1. No acute abdominal or pelvic findings. 2.  Large amount of formed stool throughout the colon. Correlate with any   evidence of constipation. XR ABDOMEN (KUB) (SINGLE AP VIEW)   Final Result   1. Moderate stool in the colon, correlate with signs of constipation. US RETROPERITONEAL COMPLETE   Final Result   Left kidney is unremarkable         CT HEAD WO CONTRAST   Final Result   No acute intracranial abnormality. Mild cerebral white matter chronic microvascular ischemic disease. Chronic right maxillary sinus opacification in setting of chronic sinusitis. 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 Patient was apparently found unresponsive on the toilet while doing her bowel prep for a colonoscopy procedure scheduled tomorrow with [place] [facility]. She also supposed to get an EGD. Patient reports she remembers getting diaphoretic and weak just prior to using the bathroom. And after she sat on the toilet she became very lightheaded and lost her consciousness. She reports she takes 22 units of Lantus in the morning which she did. Was instructed not to eat anything so she drank some water and Gatorade in the morning. She also proceeded to take 8 units of the short acting in the morning. Patient reports she is not aware that she is not supposed to take the short acting insulin with a meal if she was not going to eat anything for the meal.  She has been on insulin and a diabetic for about 10 years. EMS was called, and they reported her blood sugar was 40 at the scene. \"    Colon Cancer: New diagnosis. Weight loss, abdominal pain, constipation and elevated CEA.  Colonoscopy 2/27, pathology report now finalized and showed invasive Adenocarcinoma. Discussed with GI. General Surgery consulted. Continue bowel regimen; increased to 1/2 gallon of Miralax per GI. Hypoglycemia due to insulin use with decreased p.o. intake. Blood sugars are labile. Patient switched to NPH. Sliding scale increased. Will monitor closely. NPH adjusted for elevated blood sugars during day. SHARRON with acidosis. 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 Patient was on a bicarb drip. Improved. Continue to monitor renal function. Hypertension. Blood pressure labile. Restarted lisinopril. Amlodipine started. DVT Prophylaxis: Lovenox  Diet: ADULT DIET; Regular; 4 carb choices (60 gm/meal); Low Potassium (Less than 3000 mg/day);  Low Fiber  ADULT ORAL NUTRITION SUPPLEMENT; Breakfast, Lunch, Dinner; Diabetic Oral Supplement  Code Status: Full Code  PT/OT Eval Status: patient ambulatory at home    Dispo - Pending surgery evaluation    Jennifer Golden MD

## 2023-03-06 NOTE — PROGRESS NOTES
Lab here to draw labs. 3 techs have tried and unable to get sample.  Pt refused to be stuck a 4th time

## 2023-03-06 NOTE — PROGRESS NOTES
Pt awake a/o x4, verbal and able to make needs. No known. No concerns voiced at this time.  Call light with reach and will cont to monitor,

## 2023-03-06 NOTE — PROGRESS NOTES
GI Progress Note      SUBJECTIVE:  Feels better today. Denies nausea, emesis. Having BMs. Abd discomfort improved. Tolerating oral intake. OBJECTIVE      Medications    Current Facility-Administered Medications: lactulose (CHRONULAC) 10 GM/15ML solution 30 g, 30 g, Oral, BID  amLODIPine (NORVASC) tablet 5 mg, 5 mg, Oral, Daily  insulin NPH (HumuLIN N;NovoLIN N) injection vial 14 Units, 14 Units, SubCUTAneous, QAM  insulin lispro (HUMALOG) injection vial 0-8 Units, 0-8 Units, SubCUTAneous, TID WC  insulin lispro (HUMALOG) injection vial 0-4 Units, 0-4 Units, SubCUTAneous, Nightly  insulin NPH (HumuLIN N;NovoLIN N) injection vial 6 Units, 6 Units, SubCUTAneous, Nightly  insulin regular (HUMULIN R;NOVOLIN R) injection 10 Units, 10 Units, SubCUTAneous, Once  hydrALAZINE (APRESOLINE) injection 5 mg, 5 mg, IntraVENous, Q4H PRN  oxyCODONE-acetaminophen (PERCOCET) 7.5-325 MG per tablet 1 tablet, 1 tablet, Oral, Q8H PRN  glucose chewable tablet 16 g, 4 tablet, Oral, PRN  dextrose bolus 10% 125 mL, 125 mL, IntraVENous, PRN **OR** dextrose bolus 10% 250 mL, 250 mL, IntraVENous, PRN  glucagon (rDNA) injection 1 mg, 1 mg, IntraMUSCular, PRN  dextrose 10 % infusion, , IntraVENous, Continuous PRN  ticagrelor (BRILINTA) tablet 90 mg, 90 mg, Oral, BID  aspirin EC tablet 81 mg, 81 mg, Oral, Daily  atorvastatin (LIPITOR) tablet 20 mg, 20 mg, Oral, Daily  clonazePAM (KLONOPIN) tablet 0.5 mg, 0.5 mg, Oral, TID PRN  calcium carb-cholecalciferol 250-3. 125 MG-MCG per tab 1 tablet, 1 tablet, Oral, Daily  gabapentin (NEURONTIN) capsule 600 mg, 600 mg, Oral, TID  lisinopril (PRINIVIL;ZESTRIL) tablet 40 mg, 40 mg, Oral, Daily  paliperidone (INVEGA) extended release tablet 9 mg, 9 mg, Oral, QAM  pantoprazole (PROTONIX) tablet 40 mg, 40 mg, Oral, Daily  traZODone (DESYREL) tablet 200 mg, 200 mg, Oral, Nightly  sodium chloride flush 0.9 % injection 5-40 mL, 5-40 mL, IntraVENous, 2 times per day  sodium chloride flush 0.9 % injection 5-40 mL, 5-40 mL, IntraVENous, PRN  0.9 % sodium chloride infusion, , IntraVENous, PRN  enoxaparin (LOVENOX) injection 40 mg, 40 mg, SubCUTAneous, Daily  ondansetron (ZOFRAN-ODT) disintegrating tablet 4 mg, 4 mg, Oral, Q8H PRN **OR** ondansetron (ZOFRAN) injection 4 mg, 4 mg, IntraVENous, Q6H PRN  polyethylene glycol (GLYCOLAX) packet 17 g, 17 g, Oral, Daily PRN  acetaminophen (TYLENOL) tablet 650 mg, 650 mg, Oral, Q6H PRN **OR** acetaminophen (TYLENOL) suppository 650 mg, 650 mg, Rectal, Q6H PRN  Physical    VITALS:  /64   Pulse 61   Temp 98.6 °F (37 °C) (Oral)   Resp 17   Ht 5' 3\" (1.6 m)   Wt 121 lb 8 oz (55.1 kg)   SpO2 97%   BMI 21.52 kg/m²   ABD: soft with mild epigastric tenderness, NABs, ND  Data    CBC with Differential:    Lab Results   Component Value Date/Time    WBC 5.0 03/04/2023 10:34 AM    RBC 3.24 03/04/2023 10:34 AM    HGB 9.2 03/04/2023 10:34 AM    HCT 28.7 03/04/2023 10:34 AM     03/04/2023 10:34 AM    MCV 88.5 03/04/2023 10:34 AM    MCH 28.4 03/04/2023 10:34 AM    MCHC 32.1 03/04/2023 10:34 AM    RDW 13.9 03/04/2023 10:34 AM    SEGSPCT 71.2 05/29/2013 01:53 PM    BANDSPCT 2 02/05/2021 06:07 AM    LYMPHOPCT 34.1 02/28/2023 03:38 AM    MONOPCT 8.4 02/28/2023 03:38 AM    EOSPCT 0.4 09/05/2011 11:35 PM    BASOPCT 0.3 02/28/2023 03:38 AM    MONOSABS 0.4 02/28/2023 03:38 AM    LYMPHSABS 1.5 02/28/2023 03:38 AM    EOSABS 0.0 02/28/2023 03:38 AM    BASOSABS 0.0 02/28/2023 03:38 AM    DIFFTYPE Auto 05/29/2013 01:53 PM       ASSESSMENT AND PLAN    59 yo F with h/o DM, HTN, CAD and bipolar DO who has diarrhea, anemia, weight loss and abnormal PET scan of the colon in the setting of an elevated CEA. Admission was prompted due to hypoglycemia following bowel prep for outpatient colonoscopy. Underwent inpatient colonoscopy (2/27)with findings of narrowing at around the proximal transverse colon or hepatic flexure. Path pending.  Unable to traverse with scope and poor prep prevented optimal evaluation. CT (3/2) demonstrated a large amount of stool throughout the colon. No stricture noted. Lactulose has improved the passage of stool and associated sxs until now. - Will start 1/2 gallon og Gol[place]  - I just called the path Lab and received the biopsy result: Invasive Adenocarcinoma  - Needs resection (inpatient vs outpatient).      Rudy Hennessy MD       (O) [phone]    GI and Hepatologist

## 2023-03-06 NOTE — PLAN OF CARE
Problem: Discharge Planning  Goal: Discharge to home or other facility with appropriate resources  Outcome: Progressing     Problem: Pain  Goal: Verbalizes/displays adequate comfort level or baseline comfort level  Outcome: Progressing     Problem: Chronic Conditions and Co-morbidities  Goal: Patient's chronic conditions and co-morbidity symptoms are monitored and maintained or improved  Outcome: Progressing     Problem: Gastrointestinal - Adult  Goal: Minimal or absence of nausea and vomiting  Outcome: Progressing  Goal: Maintains or returns to baseline bowel function  Outcome: Progressing  Goal: Maintains adequate nutritional intake  Outcome: Progressing     Problem: Metabolic/Fluid and Electrolytes - Adult  Goal: Glucose maintained within prescribed range  Outcome: Progressing  Goal: Electrolytes maintained within normal limits  Outcome: Progressing     Problem: Safety - Adult  Goal: Free from fall injury  Outcome: Progressing     Problem: Nutrition Deficit:  Goal: Optimize nutritional status  Outcome: Progressing     Problem: Cardiovascular - Adult  Goal: Maintains optimal cardiac output and hemodynamic stability  Outcome: Progressing  Goal: Absence of cardiac dysrhythmias or at baseline  Outcome: Progressing     Problem: Hematologic - Adult  Goal: Maintains hematologic stability  Outcome: Progressing

## 2023-03-06 NOTE — PLAN OF CARE
Problem: Discharge Planning  Goal: Discharge to home or other facility with appropriate resources  3/6/2023 0015 by Yady Reveles RN  Outcome: Progressing  3/5/2023 1953 by Jacky Galicia RN  Outcome: Progressing     Problem: Pain  Goal: Verbalizes/displays adequate comfort level or baseline comfort level  3/6/2023 0015 by Yady Reveles RN  Outcome: Progressing  3/5/2023 1953 by Jacky Galicia RN  Outcome: Progressing     Problem: Chronic Conditions and Co-morbidities  Goal: Patient's chronic conditions and co-morbidity symptoms are monitored and maintained or improved  3/6/2023 0015 by Yady Reveles RN  Outcome: Progressing  Flowsheets (Taken 3/5/2023 2000)  Care Plan - Patient's Chronic Conditions and Co-Morbidity Symptoms are Monitored and Maintained or Improved: Monitor and assess patient's chronic conditions and comorbid symptoms for stability, deterioration, or improvement  3/5/2023 1953 by Jacky Galicia RN  Outcome: Progressing     Problem: Gastrointestinal - Adult  Goal: Minimal or absence of nausea and vomiting  3/6/2023 0015 by Yady Reveles RN  Outcome: Progressing  3/5/2023 1953 by Jacky Galicia RN  Outcome: Progressing  Goal: Maintains or returns to baseline bowel function  3/6/2023 0015 by Yady Reveles RN  Outcome: Progressing  3/5/2023 1953 by Jacky Galicia RN  Outcome: Progressing  Goal: Maintains adequate nutritional intake  3/6/2023 0015 by Yady Reveles RN  Outcome: Progressing  3/5/2023 1953 by Jacky Galicia RN  Outcome: Progressing     Problem: Metabolic/Fluid and Electrolytes - Adult  Goal: Glucose maintained within prescribed range  3/6/2023 0015 by Yady Reveles RN  Outcome: Progressing  3/5/2023 1953 by Jacky Galicia RN  Outcome: Progressing  Goal: Electrolytes maintained within normal limits  3/6/2023 0015 by Yady Reveles RN  Outcome: Progressing  3/5/2023 1953 by Jacky Galicia RN  Outcome: Progressing     Problem: Safety - Adult  Goal: Free from fall injury  3/6/2023 0015 by Adela Reddy RN  Outcome: Progressing  3/5/2023 1953 by Kartik Ford RN  Outcome: Progressing     Problem: Nutrition Deficit:  Goal: Optimize nutritional status  3/6/2023 0015 by Adela Reddy RN  Outcome: Progressing  3/5/2023 1953 by Kartik Ford RN  Outcome: Progressing     Problem: Cardiovascular - Adult  Goal: Maintains optimal cardiac output and hemodynamic stability  3/6/2023 0015 by Adela Reddy RN  Outcome: Progressing  Flowsheets (Taken 3/5/2023 2000)  Maintains optimal cardiac output and hemodynamic stability: Monitor blood pressure and heart rate  3/5/2023 1953 by Kartik Ford RN  Outcome: Progressing  Goal: Absence of cardiac dysrhythmias or at baseline  3/6/2023 0015 by Adela Reddy RN  Outcome: Progressing  Flowsheets (Taken 3/5/2023 2000)  Absence of cardiac dysrhythmias or at baseline: Monitor cardiac rate and rhythm  3/5/2023 1953 by Kartik Ford RN  Outcome: Progressing     Problem: Hematologic - Adult  Goal: Maintains hematologic stability  3/6/2023 0015 by Adela Reddy RN  Outcome: Progressing  Flowsheets (Taken 3/5/2023 2000)  Maintains hematologic stability: Assess for signs and symptoms of bleeding or hemorrhage  3/5/2023 1953 by Kartik Ford RN  Outcome: Progressing

## 2023-03-07 ENCOUNTER — ANESTHESIA EVENT (OUTPATIENT)
Dept: OPERATING ROOM | Age: 63
End: 2023-03-07
Payer: COMMERCIAL

## 2023-03-07 ENCOUNTER — ANESTHESIA (OUTPATIENT)
Dept: OPERATING ROOM | Age: 63
End: 2023-03-07
Payer: COMMERCIAL

## 2023-03-07 PROBLEM — Z12.11 SCREENING FOR COLON CANCER: Status: ACTIVE | Noted: 2023-03-07

## 2023-03-07 LAB
ABO/RH: NORMAL
ANION GAP SERPL CALCULATED.3IONS-SCNC: 7 MMOL/L (ref 3–16)
ANTIBODY SCREEN: NORMAL
BASOPHILS ABSOLUTE: 0 K/UL (ref 0–0.2)
BASOPHILS RELATIVE PERCENT: 1.2 %
BUN BLDV-MCNC: 12 MG/DL (ref 7–20)
CALCIUM SERPL-MCNC: 8.8 MG/DL (ref 8.3–10.6)
CHLORIDE BLD-SCNC: 102 MMOL/L (ref 99–110)
CO2: 29 MMOL/L (ref 21–32)
CREAT SERPL-MCNC: 1.1 MG/DL (ref 0.6–1.2)
EOSINOPHILS ABSOLUTE: 0.1 K/UL (ref 0–0.6)
EOSINOPHILS RELATIVE PERCENT: 1.9 %
GFR SERPL CREATININE-BSD FRML MDRD: 56 ML/MIN/{1.73_M2}
GLUCOSE BLD-MCNC: 237 MG/DL (ref 70–99)
GLUCOSE BLD-MCNC: 266 MG/DL (ref 70–99)
GLUCOSE BLD-MCNC: 313 MG/DL (ref 70–99)
GLUCOSE BLD-MCNC: 335 MG/DL (ref 70–99)
GLUCOSE BLD-MCNC: 370 MG/DL (ref 70–99)
GLUCOSE BLD-MCNC: 378 MG/DL (ref 70–99)
HCT VFR BLD CALC: 27.8 % (ref 36–48)
HEMOGLOBIN: 8.9 G/DL (ref 12–16)
INR BLD: 0.93 (ref 0.87–1.14)
LYMPHOCYTES ABSOLUTE: 1.3 K/UL (ref 1–5.1)
LYMPHOCYTES RELATIVE PERCENT: 34.5 %
MCH RBC QN AUTO: 28.2 PG (ref 26–34)
MCHC RBC AUTO-ENTMCNC: 31.9 G/DL (ref 31–36)
MCV RBC AUTO: 88.5 FL (ref 80–100)
MONOCYTES ABSOLUTE: 0.4 K/UL (ref 0–1.3)
MONOCYTES RELATIVE PERCENT: 9.9 %
NEUTROPHILS ABSOLUTE: 2 K/UL (ref 1.7–7.7)
NEUTROPHILS RELATIVE PERCENT: 52.5 %
PDW BLD-RTO: 14.4 % (ref 12.4–15.4)
PERFORMED ON: ABNORMAL
PLATELET # BLD: 164 K/UL (ref 135–450)
PMV BLD AUTO: 8.9 FL (ref 5–10.5)
POTASSIUM REFLEX MAGNESIUM: 5 MMOL/L (ref 3.5–5.1)
PROTHROMBIN TIME: 12.4 SEC (ref 11.7–14.5)
RBC # BLD: 3.14 M/UL (ref 4–5.2)
SODIUM BLD-SCNC: 138 MMOL/L (ref 136–145)
WBC # BLD: 3.7 K/UL (ref 4–11)

## 2023-03-07 PROCEDURE — 86850 RBC ANTIBODY SCREEN: CPT

## 2023-03-07 PROCEDURE — 86900 BLOOD TYPING SEROLOGIC ABO: CPT

## 2023-03-07 PROCEDURE — 6370000000 HC RX 637 (ALT 250 FOR IP): Performed by: INTERNAL MEDICINE

## 2023-03-07 PROCEDURE — 88305 TISSUE EXAM BY PATHOLOGIST: CPT

## 2023-03-07 PROCEDURE — 3600000019 HC SURGERY ROBOT ADDTL 15MIN: Performed by: SURGERY

## 2023-03-07 PROCEDURE — 7100000001 HC PACU RECOVERY - ADDTL 15 MIN: Performed by: SURGERY

## 2023-03-07 PROCEDURE — 2709999900 HC NON-CHARGEABLE SUPPLY: Performed by: SURGERY

## 2023-03-07 PROCEDURE — 6360000002 HC RX W HCPCS: Performed by: SURGERY

## 2023-03-07 PROCEDURE — 2720000010 HC SURG SUPPLY STERILE: Performed by: SURGERY

## 2023-03-07 PROCEDURE — 93005 ELECTROCARDIOGRAM TRACING: CPT | Performed by: ANESTHESIOLOGY

## 2023-03-07 PROCEDURE — 88309 TISSUE EXAM BY PATHOLOGIST: CPT

## 2023-03-07 PROCEDURE — 2580000003 HC RX 258: Performed by: INTERNAL MEDICINE

## 2023-03-07 PROCEDURE — 88342 IMHCHEM/IMCYTCHM 1ST ANTB: CPT

## 2023-03-07 PROCEDURE — A4217 STERILE WATER/SALINE, 500 ML: HCPCS | Performed by: SURGERY

## 2023-03-07 PROCEDURE — 2500000003 HC RX 250 WO HCPCS: Performed by: SURGERY

## 2023-03-07 PROCEDURE — 88341 IMHCHEM/IMCYTCHM EA ADD ANTB: CPT

## 2023-03-07 PROCEDURE — 7100000000 HC PACU RECOVERY - FIRST 15 MIN: Performed by: SURGERY

## 2023-03-07 PROCEDURE — 2500000003 HC RX 250 WO HCPCS: Performed by: NURSE ANESTHETIST, CERTIFIED REGISTERED

## 2023-03-07 PROCEDURE — 8E0W0CZ ROBOTIC ASSISTED PROCEDURE OF TRUNK REGION, OPEN APPROACH: ICD-10-PCS | Performed by: SURGERY

## 2023-03-07 PROCEDURE — 0DJD4ZZ INSPECTION OF LOWER INTESTINAL TRACT, PERCUTANEOUS ENDOSCOPIC APPROACH: ICD-10-PCS | Performed by: SURGERY

## 2023-03-07 PROCEDURE — 86901 BLOOD TYPING SEROLOGIC RH(D): CPT

## 2023-03-07 PROCEDURE — 2580000003 HC RX 258: Performed by: SURGERY

## 2023-03-07 PROCEDURE — 6360000002 HC RX W HCPCS: Performed by: NURSE ANESTHETIST, CERTIFIED REGISTERED

## 2023-03-07 PROCEDURE — 80048 BASIC METABOLIC PNL TOTAL CA: CPT

## 2023-03-07 PROCEDURE — 2580000003 HC RX 258: Performed by: NURSE ANESTHETIST, CERTIFIED REGISTERED

## 2023-03-07 PROCEDURE — 3700000000 HC ANESTHESIA ATTENDED CARE: Performed by: SURGERY

## 2023-03-07 PROCEDURE — 85610 PROTHROMBIN TIME: CPT

## 2023-03-07 PROCEDURE — 3700000001 HC ADD 15 MINUTES (ANESTHESIA): Performed by: SURGERY

## 2023-03-07 PROCEDURE — S2900 ROBOTIC SURGICAL SYSTEM: HCPCS | Performed by: SURGERY

## 2023-03-07 PROCEDURE — 3600000009 HC SURGERY ROBOT BASE: Performed by: SURGERY

## 2023-03-07 PROCEDURE — 0DTF0ZZ RESECTION OF RIGHT LARGE INTESTINE, OPEN APPROACH: ICD-10-PCS | Performed by: SURGERY

## 2023-03-07 PROCEDURE — 36415 COLL VENOUS BLD VENIPUNCTURE: CPT

## 2023-03-07 PROCEDURE — 85025 COMPLETE CBC W/AUTO DIFF WBC: CPT

## 2023-03-07 PROCEDURE — 6360000002 HC RX W HCPCS: Performed by: ANESTHESIOLOGY

## 2023-03-07 PROCEDURE — 1200000000 HC SEMI PRIVATE

## 2023-03-07 RX ORDER — SODIUM CHLORIDE, SODIUM LACTATE, POTASSIUM CHLORIDE, CALCIUM CHLORIDE 600; 310; 30; 20 MG/100ML; MG/100ML; MG/100ML; MG/100ML
INJECTION, SOLUTION INTRAVENOUS CONTINUOUS PRN
Status: DISCONTINUED | OUTPATIENT
Start: 2023-03-07 | End: 2023-03-07 | Stop reason: SDUPTHER

## 2023-03-07 RX ORDER — ONDANSETRON 2 MG/ML
4 INJECTION INTRAMUSCULAR; INTRAVENOUS
Status: DISCONTINUED | OUTPATIENT
Start: 2023-03-07 | End: 2023-03-07 | Stop reason: HOSPADM

## 2023-03-07 RX ORDER — DEXAMETHASONE SODIUM PHOSPHATE 4 MG/ML
INJECTION, SOLUTION INTRA-ARTICULAR; INTRALESIONAL; INTRAMUSCULAR; INTRAVENOUS; SOFT TISSUE PRN
Status: DISCONTINUED | OUTPATIENT
Start: 2023-03-07 | End: 2023-03-07 | Stop reason: SDUPTHER

## 2023-03-07 RX ORDER — CEFAZOLIN SODIUM IN 0.9 % NACL 2 G/100 ML
2000 PLASTIC BAG, INJECTION (ML) INTRAVENOUS
Status: COMPLETED | OUTPATIENT
Start: 2023-03-07 | End: 2023-03-07

## 2023-03-07 RX ORDER — LIDOCAINE HYDROCHLORIDE 20 MG/ML
INJECTION, SOLUTION INFILTRATION; PERINEURAL PRN
Status: DISCONTINUED | OUTPATIENT
Start: 2023-03-07 | End: 2023-03-07 | Stop reason: SDUPTHER

## 2023-03-07 RX ORDER — EPHEDRINE SULFATE 50 MG/ML
50 INJECTION, SOLUTION INTRAVENOUS ONCE
Status: CANCELLED | OUTPATIENT
Start: 2023-03-07 | End: 2023-03-07

## 2023-03-07 RX ORDER — SODIUM CHLORIDE 0.9 % (FLUSH) 0.9 %
5-40 SYRINGE (ML) INJECTION EVERY 12 HOURS SCHEDULED
Status: DISCONTINUED | OUTPATIENT
Start: 2023-03-07 | End: 2023-03-07 | Stop reason: HOSPADM

## 2023-03-07 RX ORDER — PROPOFOL 10 MG/ML
INJECTION, EMULSION INTRAVENOUS PRN
Status: DISCONTINUED | OUTPATIENT
Start: 2023-03-07 | End: 2023-03-07 | Stop reason: SDUPTHER

## 2023-03-07 RX ORDER — OXYCODONE HYDROCHLORIDE 5 MG/1
5 TABLET ORAL PRN
Status: DISCONTINUED | OUTPATIENT
Start: 2023-03-07 | End: 2023-03-07 | Stop reason: HOSPADM

## 2023-03-07 RX ORDER — LABETALOL HYDROCHLORIDE 5 MG/ML
5 INJECTION, SOLUTION INTRAVENOUS EVERY 10 MIN PRN
Status: DISCONTINUED | OUTPATIENT
Start: 2023-03-07 | End: 2023-03-07 | Stop reason: HOSPADM

## 2023-03-07 RX ORDER — MAGNESIUM HYDROXIDE 1200 MG/15ML
LIQUID ORAL CONTINUOUS PRN
Status: DISCONTINUED | OUTPATIENT
Start: 2023-03-07 | End: 2023-03-07 | Stop reason: HOSPADM

## 2023-03-07 RX ORDER — SODIUM CHLORIDE 0.9 % (FLUSH) 0.9 %
5-40 SYRINGE (ML) INJECTION PRN
Status: DISCONTINUED | OUTPATIENT
Start: 2023-03-07 | End: 2023-03-07 | Stop reason: HOSPADM

## 2023-03-07 RX ORDER — SODIUM CHLORIDE 9 MG/ML
INJECTION, SOLUTION INTRAVENOUS PRN
Status: DISCONTINUED | OUTPATIENT
Start: 2023-03-07 | End: 2023-03-07 | Stop reason: HOSPADM

## 2023-03-07 RX ORDER — FENTANYL CITRATE 50 UG/ML
INJECTION, SOLUTION INTRAMUSCULAR; INTRAVENOUS PRN
Status: DISCONTINUED | OUTPATIENT
Start: 2023-03-07 | End: 2023-03-07 | Stop reason: SDUPTHER

## 2023-03-07 RX ORDER — PHENYLEPHRINE HCL IN 0.9% NACL 1 MG/10 ML
SYRINGE (ML) INTRAVENOUS PRN
Status: DISCONTINUED | OUTPATIENT
Start: 2023-03-07 | End: 2023-03-07 | Stop reason: SDUPTHER

## 2023-03-07 RX ORDER — EPHEDRINE SULFATE 50 MG/ML
INJECTION INTRAVENOUS
Status: DISPENSED
Start: 2023-03-07 | End: 2023-03-08

## 2023-03-07 RX ORDER — LABETALOL HYDROCHLORIDE 5 MG/ML
INJECTION, SOLUTION INTRAVENOUS PRN
Status: DISCONTINUED | OUTPATIENT
Start: 2023-03-07 | End: 2023-03-07 | Stop reason: SDUPTHER

## 2023-03-07 RX ORDER — GLYCOPYRROLATE 0.2 MG/ML
INJECTION INTRAMUSCULAR; INTRAVENOUS PRN
Status: DISCONTINUED | OUTPATIENT
Start: 2023-03-07 | End: 2023-03-07 | Stop reason: SDUPTHER

## 2023-03-07 RX ORDER — ROCURONIUM BROMIDE 10 MG/ML
INJECTION, SOLUTION INTRAVENOUS PRN
Status: DISCONTINUED | OUTPATIENT
Start: 2023-03-07 | End: 2023-03-07 | Stop reason: SDUPTHER

## 2023-03-07 RX ORDER — ONDANSETRON 2 MG/ML
INJECTION INTRAMUSCULAR; INTRAVENOUS PRN
Status: DISCONTINUED | OUTPATIENT
Start: 2023-03-07 | End: 2023-03-07 | Stop reason: SDUPTHER

## 2023-03-07 RX ORDER — NALOXONE HYDROCHLORIDE 0.4 MG/ML
INJECTION, SOLUTION INTRAMUSCULAR; INTRAVENOUS; SUBCUTANEOUS PRN
Status: DISCONTINUED | OUTPATIENT
Start: 2023-03-07 | End: 2023-03-07 | Stop reason: SDUPTHER

## 2023-03-07 RX ORDER — METRONIDAZOLE 500 MG/100ML
500 INJECTION, SOLUTION INTRAVENOUS
Status: COMPLETED | OUTPATIENT
Start: 2023-03-07 | End: 2023-03-07

## 2023-03-07 RX ORDER — BUPIVACAINE HYDROCHLORIDE AND EPINEPHRINE 5; 5 MG/ML; UG/ML
INJECTION, SOLUTION PERINEURAL PRN
Status: DISCONTINUED | OUTPATIENT
Start: 2023-03-07 | End: 2023-03-07 | Stop reason: HOSPADM

## 2023-03-07 RX ORDER — DIPHENHYDRAMINE HYDROCHLORIDE 50 MG/ML
12.5 INJECTION INTRAMUSCULAR; INTRAVENOUS
Status: DISCONTINUED | OUTPATIENT
Start: 2023-03-07 | End: 2023-03-07 | Stop reason: HOSPADM

## 2023-03-07 RX ORDER — NALOXONE HYDROCHLORIDE 1 MG/ML
INJECTION INTRAMUSCULAR; INTRAVENOUS; SUBCUTANEOUS
Status: DISCONTINUED
Start: 2023-03-07 | End: 2023-03-07 | Stop reason: WASHOUT

## 2023-03-07 RX ORDER — OXYCODONE HYDROCHLORIDE 5 MG/1
10 TABLET ORAL PRN
Status: DISCONTINUED | OUTPATIENT
Start: 2023-03-07 | End: 2023-03-07 | Stop reason: HOSPADM

## 2023-03-07 RX ADMIN — FENTANYL CITRATE 50 MCG: 50 INJECTION, SOLUTION INTRAMUSCULAR; INTRAVENOUS at 14:34

## 2023-03-07 RX ADMIN — GABAPENTIN 600 MG: 300 CAPSULE ORAL at 23:53

## 2023-03-07 RX ADMIN — METRONIDAZOLE 500 MG: 500 INJECTION, SOLUTION INTRAVENOUS at 14:53

## 2023-03-07 RX ADMIN — Medication 100 MCG: at 16:43

## 2023-03-07 RX ADMIN — GLYCOPYRROLATE 0.4 MG: 0.2 INJECTION, SOLUTION INTRAMUSCULAR; INTRAVENOUS at 14:45

## 2023-03-07 RX ADMIN — LABETALOL HYDROCHLORIDE 5 MG: 5 INJECTION, SOLUTION INTRAVENOUS at 15:08

## 2023-03-07 RX ADMIN — ROCURONIUM BROMIDE 20 MG: 10 SOLUTION INTRAVENOUS at 15:47

## 2023-03-07 RX ADMIN — OXYCODONE AND ACETAMINOPHEN 1 TABLET: 7.5; 325 TABLET ORAL at 23:54

## 2023-03-07 RX ADMIN — SODIUM CHLORIDE, PRESERVATIVE FREE 10 ML: 5 INJECTION INTRAVENOUS at 23:55

## 2023-03-07 RX ADMIN — OXYCODONE AND ACETAMINOPHEN 1 TABLET: 7.5; 325 TABLET ORAL at 09:44

## 2023-03-07 RX ADMIN — TRAZODONE HYDROCHLORIDE 200 MG: 50 TABLET ORAL at 23:54

## 2023-03-07 RX ADMIN — SODIUM CHLORIDE, PRESERVATIVE FREE 10 ML: 5 INJECTION INTRAVENOUS at 09:35

## 2023-03-07 RX ADMIN — ROCURONIUM BROMIDE 50 MG: 10 SOLUTION INTRAVENOUS at 14:34

## 2023-03-07 RX ADMIN — ONDANSETRON 4 MG: 2 INJECTION INTRAMUSCULAR; INTRAVENOUS at 16:43

## 2023-03-07 RX ADMIN — DEXAMETHASONE SODIUM PHOSPHATE 8 MG: 4 INJECTION, SOLUTION INTRAMUSCULAR; INTRAVENOUS at 14:42

## 2023-03-07 RX ADMIN — SODIUM CHLORIDE, SODIUM LACTATE, POTASSIUM CHLORIDE, AND CALCIUM CHLORIDE: .6; .31; .03; .02 INJECTION, SOLUTION INTRAVENOUS at 14:28

## 2023-03-07 RX ADMIN — LISINOPRIL 40 MG: 20 TABLET ORAL at 09:35

## 2023-03-07 RX ADMIN — Medication 150 MCG: at 16:18

## 2023-03-07 RX ADMIN — NALOXONE HYDROCHLORIDE 0.1 MG: 0.4 INJECTION, SOLUTION INTRAMUSCULAR; INTRAVENOUS; SUBCUTANEOUS at 17:11

## 2023-03-07 RX ADMIN — PALIPERIDONE 9 MG: 3 TABLET, EXTENDED RELEASE ORAL at 09:44

## 2023-03-07 RX ADMIN — Medication 1 TABLET: at 09:35

## 2023-03-07 RX ADMIN — Medication 100 MCG: at 16:57

## 2023-03-07 RX ADMIN — FENTANYL CITRATE 50 MCG: 50 INJECTION, SOLUTION INTRAMUSCULAR; INTRAVENOUS at 14:55

## 2023-03-07 RX ADMIN — GABAPENTIN 600 MG: 300 CAPSULE ORAL at 09:35

## 2023-03-07 RX ADMIN — Medication 2000 MG: at 14:38

## 2023-03-07 RX ADMIN — AMLODIPINE BESYLATE 5 MG: 5 TABLET ORAL at 09:35

## 2023-03-07 RX ADMIN — ONDANSETRON 4 MG: 2 INJECTION INTRAMUSCULAR; INTRAVENOUS at 17:53

## 2023-03-07 RX ADMIN — PANTOPRAZOLE SODIUM 40 MG: 40 TABLET, DELAYED RELEASE ORAL at 09:34

## 2023-03-07 RX ADMIN — SODIUM CHLORIDE, SODIUM LACTATE, POTASSIUM CHLORIDE, AND CALCIUM CHLORIDE: .6; .31; .03; .02 INJECTION, SOLUTION INTRAVENOUS at 15:33

## 2023-03-07 RX ADMIN — FENTANYL CITRATE 50 MCG: 50 INJECTION, SOLUTION INTRAMUSCULAR; INTRAVENOUS at 15:02

## 2023-03-07 RX ADMIN — PROPOFOL 120 MG: 10 INJECTION, EMULSION INTRAVENOUS at 14:34

## 2023-03-07 RX ADMIN — LIDOCAINE HYDROCHLORIDE 60 MG: 20 INJECTION, SOLUTION INFILTRATION; PERINEURAL at 14:34

## 2023-03-07 RX ADMIN — SUGAMMADEX 200 MG: 100 INJECTION, SOLUTION INTRAVENOUS at 16:50

## 2023-03-07 RX ADMIN — ATORVASTATIN CALCIUM 20 MG: 10 TABLET, FILM COATED ORAL at 09:35

## 2023-03-07 ASSESSMENT — PAIN - FUNCTIONAL ASSESSMENT: PAIN_FUNCTIONAL_ASSESSMENT: 0-10

## 2023-03-07 ASSESSMENT — PAIN DESCRIPTION - LOCATION
LOCATION: BACK
LOCATION: ABDOMEN

## 2023-03-07 ASSESSMENT — PAIN DESCRIPTION - ORIENTATION: ORIENTATION: LOWER

## 2023-03-07 ASSESSMENT — PAIN SCALES - GENERAL
PAINLEVEL_OUTOF10: 9
PAINLEVEL_OUTOF10: 10

## 2023-03-07 ASSESSMENT — PAIN DESCRIPTION - DESCRIPTORS: DESCRIPTORS: ACHING

## 2023-03-07 NOTE — PLAN OF CARE
Problem: Discharge Planning  Goal: Discharge to home or other facility with appropriate resources  3/7/2023 0022 by Adela Reddy RN  Outcome: Progressing  3/7/2023 0022 by Adela Reddy RN  Outcome: Progressing     Problem: Pain  Goal: Verbalizes/displays adequate comfort level or baseline comfort level  3/7/2023 0022 by Adela Reddy RN  Outcome: Progressing  3/7/2023 0022 by Adela Reddy RN  Outcome: Progressing     Problem: Chronic Conditions and Co-morbidities  Goal: Patient's chronic conditions and co-morbidity symptoms are monitored and maintained or improved  3/7/2023 0022 by Adela Reddy RN  Outcome: Progressing  Flowsheets (Taken 3/6/2023 2000)  Care Plan - Patient's Chronic Conditions and Co-Morbidity Symptoms are Monitored and Maintained or Improved: Monitor and assess patient's chronic conditions and comorbid symptoms for stability, deterioration, or improvement  3/7/2023 0022 by Adela Reddy RN  Outcome: Progressing  Flowsheets (Taken 3/6/2023 2000)  Care Plan - Patient's Chronic Conditions and Co-Morbidity Symptoms are Monitored and Maintained or Improved: Monitor and assess patient's chronic conditions and comorbid symptoms for stability, deterioration, or improvement     Problem: Gastrointestinal - Adult  Goal: Minimal or absence of nausea and vomiting  3/7/2023 0022 by Adela Reddy RN  Outcome: Progressing  3/7/2023 0022 by Adela Reddy RN  Outcome: Progressing  Goal: Maintains or returns to baseline bowel function  3/7/2023 0022 by Adela Reddy RN  Outcome: Progressing  3/7/2023 0022 by Adela Reddy RN  Outcome: Progressing  Goal: Maintains adequate nutritional intake  3/7/2023 0022 by Adela Reddy RN  Outcome: Progressing  3/7/2023 0022 by Adela Reddy RN  Outcome: Progressing     Problem: Metabolic/Fluid and Electrolytes - Adult  Goal: Glucose maintained within prescribed range  3/7/2023 0022 by Adela Reddy RN  Outcome: Progressing  3/7/2023 0022 by Yasmeen Jenkins Shilpi Anaya RN  Outcome: Progressing  Goal: Electrolytes maintained within normal limits  3/7/2023 0022 by Fidencio Rucker RN  Outcome: Progressing  3/7/2023 0022 by Fidencio Rucker RN  Outcome: Progressing     Problem: Safety - Adult  Goal: Free from fall injury  3/7/2023 0022 by Fidencio Rucker RN  Outcome: Progressing  3/7/2023 0022 by Fidencio Rucker RN  Outcome: Progressing     Problem: Nutrition Deficit:  Goal: Optimize nutritional status  3/7/2023 0022 by Fidencio Rucker RN  Outcome: Progressing  3/7/2023 0022 by Fidencio Rucker RN  Outcome: Progressing     Problem: Cardiovascular - Adult  Goal: Maintains optimal cardiac output and hemodynamic stability  3/7/2023 0022 by Fidencio Rucker RN  Outcome: Progressing  Flowsheets (Taken 3/6/2023 2000)  Maintains optimal cardiac output and hemodynamic stability: Monitor blood pressure and heart rate  3/7/2023 0022 by Fidencio Rucker RN  Outcome: Progressing  Flowsheets (Taken 3/6/2023 2000)  Maintains optimal cardiac output and hemodynamic stability: Monitor blood pressure and heart rate  Goal: Absence of cardiac dysrhythmias or at baseline  3/7/2023 0022 by Fidencio Rucker RN  Outcome: Progressing  3/7/2023 0022 by Fidencio Rucker RN  Outcome: Progressing     Problem: Hematologic - Adult  Goal: Maintains hematologic stability  3/7/2023 0022 by Fdiencio Rucker RN  Outcome: Progressing  Flowsheets (Taken 3/6/2023 2000)  Maintains hematologic stability: Assess for signs and symptoms of bleeding or hemorrhage  3/7/2023 0022 by Fidencio Rucker RN  Outcome: Progressing  Flowsheets (Taken 3/6/2023 2000)  Maintains hematologic stability: Assess for signs and symptoms of bleeding or hemorrhage

## 2023-03-07 NOTE — PROGRESS NOTES
Patient in the OR during rounds. Will follow-up tomorrow. Please call with any questions or concerns prior to then.     Electronically signed by: Jettie Gottron, APRN 3/7/2023 8:51 AM

## 2023-03-07 NOTE — CARE COORDINATION
Chart reviewed. Hypoglycemia (resolved), diarrhea., abnormal CEA/PET scan of hepatic flexure. Management per GI and IM. Blood sugars are uncontrolled. General surgery consulted for new colon cancer diagnosis. Pt from home, alone. Has friends who can assist. PTOT recs for [facility] AT [facility]. [facility] following. CM following.  [place] An, RN

## 2023-03-07 NOTE — ANESTHESIA PRE PROCEDURE
Department of Anesthesiology  Preprocedure Note       Name:  Patricia Arana   Age:  60 y.o.  :  1960                                          MRN:  [id number]         Date:  3/7/2023      Surgeon: Sadia Dunbar):  Harriet Cadet MD    Procedure: Procedure(s):  ROBOTIC RIGHT COLECTOMY    Medications prior to admission:   Prior to Admission medications    Medication Sig Start Date End Date Taking? Authorizing Provider   acetaminophen (TYLENOL) 500 MG tablet Take 1 tablet by mouth 4 times daily as needed for Pain 23   RANGEL Gutierrez   clonazePAM (KLONOPIN) 0.5 MG tablet Take 1 tablet by mouth 3 times daily as needed for Anxiety for up to 10 days. 22  Dhruv Sigala MD   gabapentin (NEURONTIN) 600 MG tablet Take 1 tablet by mouth 3 times daily for 10 days. 22  Dhruv Sigala MD   insulin glargine (LANTUS) 100 UNIT/ML injection vial Inject 30 Units into the skin every morning 22   Dhruv Sigala MD   insulin lispro, 1 Unit Dial, (HUMALOG/ADMELOG) 100 UNIT/ML SOPN With meals                   - at bedtime    < 150 ---   5 units            0 units  151-200 -- 6 units            0 units  201-250 :  7 units            1 units  251-300:   8 units            2 units  301-350:   9 units            3 units  >350 :       10 units          4 units  Upto 30 units/day 10/27/22   Lorna Izaguirre MD   lisinopril (PRINIVIL;ZESTRIL) 40 MG tablet TAKE 1 TABLET BY MOUTH EVERY DAY 22   Lorna Izaguirre MD   atorvastatin (LIPITOR) 20 MG tablet TAKE 1 TABLET BY MOUTH EVERY DAY 22   Lorna Izaguirre MD   blood glucose test strips (TRUE METRIX BLOOD GLUCOSE TEST) strip As needed.   Patient taking differently: 3 each daily 22   Lorna Izaguirre MD   Insulin Pen Needle 32G X 4 MM MISC 1 each by Does not apply route daily 22   Lorna Izaguirre MD   Blood Glucose Monitoring Suppl (TRUE METRIX METER) w/Device KIT Used to  check blood sugar 3 times eyad 7/22/22   Gray Hernández MD   ticagrelor (BRILINTA) 90 MG TABS tablet TAKE 1 TABLET BY MOUTH TWICE A DAY 4/18/22   Joe Mccabe MD   paliperidone (INVEGA) 9 MG extended release tablet Take 9 mg by mouth every morning 3/15/21   Historical Provider, MD   pantoprazole (PROTONIX) 40 MG tablet Take 40 mg by mouth daily  4/3/21   Historical Provider, MD   ferrous sulfate (IRON 325) 325 (65 Fe) MG tablet Take 325 mg by mouth daily (with breakfast)    Historical Provider, MD   calcium carbonate-vitamin D (CALTRATE) 600-400 MG-UNIT TABS per tab Take 1 tablet by mouth daily  12/30/19   Historical Provider, MD   aspirin 81 MG tablet Take 81 mg by mouth daily    Historical Provider, MD   traZODone (DESYREL) 100 MG tablet Take 200 mg by mouth nightly     Historical Provider, MD       Current medications:    Current Facility-Administered Medications   Medication Dose Route Frequency Provider Last Rate Last Admin    ceFAZolin (ANCEF) 2000 mg in 0.9% sodium chloride 100 mL IVPB  2,000 mg IntraVENous On Call to 1968[address] Hayley Kasper MD        metronidazole (FLAGYL) 500 mg in 0.9% NaCl 100 mL IVPB premix  500 mg IntraVENous On Call to [address] Hayley Kasper MD        lactulose (CHRONULAC) 10 GM/15ML solution 30 g  30 g Oral BID Obdulio Last MD   30 g at 03/06/23 0851    amLODIPine (NORVASC) tablet 5 mg  5 mg Oral Daily Obdulio Last MD   5 mg at 03/07/23 0935    insulin NPH (HumuLIN N;NovoLIN N) injection vial 14 Units  14 Units SubCUTAneous QAM Obdulio Last MD   14 Units at 03/06/23 0837    insulin lispro (HUMALOG) injection vial 0-8 Units  0-8 Units SubCUTAneous TID WC Obdulio Last MD   6 Units at 03/06/23 1239    insulin lispro (HUMALOG) injection vial 0-4 Units  0-4 Units SubCUTAneous Nightly Obdulio Last MD   4 Units at 03/06/23 2040    insulin NPH (HumuLIN N;NovoLIN N) injection vial 6 Units  6 Units SubCUTAneous Nightly Obdulio Last MD   6 Units at 03/06/23 2041    insulin regular (HUMULIN R;NOVOLIN R) injection 10 Units  10 Units SubCUTAneous Once Jannette Cervantes MD        hydrALAZINE (APRESOLINE) injection 5 mg  5 mg IntraVENous Q4H PRN Tamara Dumont MD   5 mg at 03/04/23 0904    oxyCODONE-acetaminophen (PERCOCET) 7.5-325 MG per tablet 1 tablet  1 tablet Oral Q8H PRN Polina Laird MD   1 tablet at 03/07/23 0944    glucose chewable tablet 16 g  4 tablet Oral PRN Polina Laird MD        dextrose bolus 10% 125 mL  125 mL IntraVENous PRN Polina Laird MD        Or    dextrose bolus 10% 250 mL  250 mL IntraVENous PRN Polina Laird MD        glucagon (rDNA) injection 1 mg  1 mg IntraMUSCular PRN Polina Laird MD        dextrose 10 % infusion   IntraVENous Continuous PRN Polina Laird MD        [Held by provider] [facility]) tablet 90 mg  90 mg Oral BID Tamara Dumont MD   90 mg at 03/06/23 2034    aspirin EC tablet 81 mg  81 mg Oral Daily Tamara Dumont MD   81 mg at 03/06/23 0837    atorvastatin (LIPITOR) tablet 20 mg  20 mg Oral Daily Polina Laird MD   20 mg at 03/07/23 0935    clonazePAM (KLONOPIN) tablet 0.5 mg  0.5 mg Oral TID PRN Polina Laird MD   0.5 mg at 03/06/23 1644    calcium carb-cholecalciferol 250-3. 125 MG-MCG per tab 1 tablet  1 tablet Oral Daily Polina Laird MD   1 tablet at 03/07/23 0935    gabapentin (NEURONTIN) capsule 600 mg  600 mg Oral TID Polina Laird MD   600 mg at 03/07/23 0935    lisinopril (PRINIVIL;ZESTRIL) tablet 40 mg  40 mg Oral Daily Polina Laird MD   40 mg at 03/07/23 0935    paliperidone (INVEGA) extended release tablet 9 mg  9 mg Oral QAM Polina Laird MD   9 mg at 03/07/23 0944    pantoprazole (PROTONIX) tablet 40 mg  40 mg Oral Daily Polina Laird MD   40 mg at 03/07/23 0934    traZODone (DESYREL) tablet 200 mg  200 mg Oral Nightly Polina Laird MD   200 mg at 03/06/23 2034    sodium chloride flush 0.9 % injection 5-40 mL  5-40 mL IntraVENous 2 times per day Maggie Hinojosa MD   10 mL at 03/07/23 0935    sodium chloride flush 0.9 % injection 5-40 mL  5-40 mL IntraVENous PRN Maggie Hinojosa MD   10 mL at 03/04/23 0904    0.9 % sodium chloride infusion   IntraVENous PRN Maggie Hinojosa MD        enoxaparin (LOVENOX) injection 40 mg  40 mg SubCUTAneous Daily Maggie Hinojosa MD   40 mg at 03/06/23 0837    ondansetron (ZOFRAN-ODT) disintegrating tablet 4 mg  4 mg Oral Q8H PRN Maggie Hinojosa MD        Or    ondansetron [facility]) injection 4 mg  4 mg IntraVENous Q6H PRN Maggie Hinojosa MD   4 mg at 02/27/23 1035    polyethylene glycol (GLYCOLAX) packet 17 g  17 g Oral Daily PRN Maggie Hinojosa MD        acetaminophen (TYLENOL) tablet 650 mg  650 mg Oral Q6H PRN Maggie Hinojosa MD   650 mg at 03/05/23 1644    Or    acetaminophen (TYLENOL) suppository 650 mg  650 mg Rectal Q6H PRN Maggie Hinojosa MD           Allergies: Allergies   Allergen Reactions    Morphine Anaphylaxis and Hives     feels like throat is closing    Penicillins Hives and Swelling    Codeine Hives and Rash    Penicillin G Rash       Problem List:    Patient Active Problem List   Diagnosis Code    Acute hyperglycemia R73.9    Hypertension, uncontrolled I10    Bilateral malignant neoplasm of breast in female ([facility] C50.911, C50.912    Microalbuminuria R80.9    Obesity (BMI 30-39. 9) E66.9    Slurred speech R47.81    Hx of ischemic CVA I63.40    Brain metastases (HCC) C79.31    Carotid stenosis, bilateral:<50%:per US 7/2016 I65.23    SHARRON (acute kidney injury) (HCC) N17.9    Lactic acidosis E87.20    Frequent falls R29.6    Depression/anxiety F41.8    Abnormal brain MRI R90.89    Acute bilateral low back pain without sciatica M54.50    Closed fracture of right ankle, with routine healing, subsequent encounter S82.891D    Stage 3a chronic kidney disease ([facility] N18.31    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 ([facility] E11.59    Dyslipidemia associated with type 2 diabetes mellitus (HCC) E11.69, E78.5    Bipolar disorder ([facility] F31.9    Cardiomegaly I51.7    Cardiomyopathy ([facility] I42.9    Carpal tunnel syndrome G56.00    Chronic systolic heart failure (HCC) I50.22    Diffuse cystic mastopathy N60.19    Edema R60.9    Gallstone pancreatitis K85.10    Gout M10.9    History of breast cancer Z85.3    History of renal cell carcinoma Z85.528    Cardiomyopathy in other diseases classified elsewhere I43    Mononeuritis G58.9    Myalgia and myositis [id number]    Nonspecific abnormal electrocardiogram (ECG) (EKG) R94.31    Patient in clinical research study Z00.6    Peroneal muscular atrophy G60.0    Scoliosis (and kyphoscoliosis), idiopathic M41.20    SOBOE (shortness of breath on exertion) R06.02    Syncope and collapse R55    Chronic pain disorder G89.4    DDD (degenerative disc disease), lumbar M51.36    Diabetic polyneuropathy associated with type 2 diabetes mellitus (HCC) E11.42    Other secondary scoliosis, lumbosacral region M41.57    Thoracic spondylosis without myelopathy M47.814    Morbid obesity due to excess calories ([facility]) E66.01    Age-related nuclear cataract of both eyes H25.13    Hypermetropia, bilateral H52.03    Hypertensive retinopathy, bilateral H35.033    Vitreous degeneration, bilateral H43.813    Acute bilateral low back pain with left-sided sciatica M54.42    History of CVA (cerebrovascular accident) without residual deficits Z86.73    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 stage 3 chronic kidney disease (HCC) I13.0, I50.22, N18.30    S/P mastectomy, right Z90.11    Acute cystitis without hematuria N30.00    Hypomagnesemia E83.42    Chest pain R07.9    CAD S/P percutaneous coronary angioplasty I25.10, Z98.61    Hyperglycemia due to type 2 diabetes mellitus ([facility] E11.65  Hx of heart artery stent Z95.5    Nuclear senile cataract H25.10    Unintentional weight loss R63.4    Chronic anemia D64.9    Facial abscess L02.01    Asymptomatic bacteriuria R82.71    Acute encephalopathy G93.40    Tobacco use disorder F17.200    Severe malnutrition (HCC) E43    Acute right eye pain H57.11    Suspected condition R69    Bipolar affective disorder, currently depressed, moderate (HCC) F31.32    Seasonal allergies J30.2    Symptomatic bradycardia R00.1    Dyspnea R06.00    Diffuse pain R52    Hypoglycemia due to insulin E16.0, T38.3X5A    Screening for colon cancer Z12.11       Past Medical History:        Diagnosis Date    Abnormal brain MRI 7/20/2017    Partially empty sella and minimal chronic small vessel ischemic disease    Acute bilateral low back pain without sciatica 11/2/2016    SHARRON (acute kidney injury) ([facility] 7/5/2017    Arthritis     back    Bipolar disorder ([facility] 10/18/2008    CAD (coronary artery disease)     stent placed 6/8/20    Cancer [facility]) 2015    bilateral breast:s/p lumpectomy/radiation:under care care of breast specialist:Dr. Boone     Carotid stenosis, bilateral:<50%:per US 7/2016 7/15/2016    Carpal tunnel syndrome 10/18/2008    Cervical cancer screening 2014    Nml per pt'.     Coronary artery disease of native artery of native heart with stable angina pectoris ([facility] 6/9/2020    DDD (degenerative disc disease), lumbar 7/18/2018    Depression     under care of pschiatrist:Dr. Rai Lim    Depression/anxiety 7/5/2017    Depression/anxiety     Diabetes mellitus ([address]     Gout     History of mammogram 10/28/2016;8/14/17    Negative    History of therapeutic radiation     Hyperlipidemia     Hypertension     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 stage 3 chronic kidney disease ([address] 9/17/2017    Microalbuminuria 7/1/2016    Neuropathy in diabetes ([address]     Non morbid obesity 7/1/2016    Pancreatitis 16    [facility] hospitalization 16-16:under care of GI:chronic pancreatitis    S/P endoscopy 2016    [facility]:per pt' & her family member was nml.     Scoliosis     Spondylosis of lumbar region without myelopathy or radiculopathy 3/10/2017    Transient cerebral ischemia 07/15/2016    TIA:7/10/16    Unspecified cerebral artery occlusion with cerebral infarction     TIA       Past Surgical History:        Procedure Laterality Date    BREAST LUMPECTOMY      Bilateral:breast cancer    CARDIAC CATHETERIZATION  2020    Dr. Dhaval Alatorre), DAYANA to Dia [address]    COLONOSCOPY N/A 2021    COLONOSCOPY DIAGNOSTIC performed by Merle Tompkins MD at [place] [address] 2023    COLONOSCOPY WITH BIOPSY performed by Merle Tompkins MD at [address] [place] [address]  2/3/2021    CT BONE MARROW BIOPSY 2/3/2021 Ej Mcclellan MD [facility] CT SCAN    HYSTERECTOMY (CERVIX STATUS UNKNOWN)      Benign:no cervical cancer per pt'    KIDNEY REMOVAL      right    OTHER SURGICAL HISTORY Right     orif right ankle    TEMPORAL ARTERY BIOPSY Right 2021    RIGHT TEMPORAL ARTERY BIOPSY LIGATION performed by Malathi Diego MD at [address] N/A 2021    EGD BIOPSY performed by Merle Tompkins MD at [address] 12/15/2022    EGD BIOPSY performed by Merle Tompkins MD at [address] [place] [address] History:    Social History     Tobacco Use    Smoking status: Former     Packs/day: 0.50     Years: 20.00     Pack years: 10.00     Types: Cigarettes, Cigars     Quit date: 7/3/2014     Years since quittin.6    Smokeless tobacco: Never   Substance Use Topics    Alcohol use: No     Alcohol/week: 0.0 standard drinks                                Counseling given: Not Answered      Vital Signs (Current):   Vitals:    23 1625 23 2000 23 2345 23 0815   BP: (!) 156/68 (!) 163/77 (!) 156/65 (!) 156/71   Pulse: 58 62 66 58   Resp: 15 16 17 16   Temp: 97 °F (36.1 °C) 97.5 °F (36.4 °C) 98.1 °F (36.7 °C)    TempSrc: Axillary Oral Oral    SpO2: 100%  99% 96%   Weight:       Height:                                                  BP Readings from Last 3 Encounters:   03/07/23 (!) 156/71   02/11/23 (!) 164/68   01/11/23 (!) 186/59       NPO Status: Time of last liquid consumption: 0200                        Time of last solid consumption: 2200                        Date of last liquid consumption: 02/27/23                        Date of last solid food consumption: 02/24/23    BMI:   Wt Readings from Last 3 Encounters:   03/01/23 121 lb 8 oz (55.1 kg)   02/10/23 119 lb (54 kg)   01/11/23 127 lb (57.6 kg)     Body mass index is 21.52 kg/m².     CBC:   Lab Results   Component Value Date/Time    WBC 3.7 03/07/2023 08:24 AM    RBC 3.14 03/07/2023 08:24 AM    HGB 8.9 03/07/2023 08:24 AM    HCT 27.8 03/07/2023 08:24 AM    MCV 88.5 03/07/2023 08:24 AM    RDW 14.4 03/07/2023 08:24 AM     03/07/2023 08:24 AM       CMP:   Lab Results   Component Value Date/Time     03/07/2023 06:29 AM    K 5.0 03/07/2023 06:29 AM     03/07/2023 06:29 AM    CO2 29 03/07/2023 06:29 AM    BUN 12 03/07/2023 06:29 AM    CREATININE 1.1 03/07/2023 06:29 AM    GFRAA >60 09/27/2022 09:41 AM    GFRAA >60 05/29/2013 01:53 PM    AGRATIO 0.9 02/26/2023 07:30 PM    LABGLOM 56 03/07/2023 06:29 AM    GLUCOSE 335 03/07/2023 06:29 AM    PROT 7.1 02/26/2023 07:30 PM    PROT 8.1 01/05/2013 10:34 PM    CALCIUM 8.8 03/07/2023 06:29 AM    BILITOT 0.3 02/26/2023 07:30 PM    ALKPHOS 162 02/26/2023 07:30 PM    AST 39 02/26/2023 07:30 PM    ALT 40 02/26/2023 07:30 PM       POC Tests:   Recent Labs     03/07/23  0815   POCGLU 266*       Coags:   Lab Results   Component Value Date/Time    PROTIME 12.4 03/07/2023 08:24 AM    INR 0.93 03/07/2023 08:24 AM    APTT 24.8 12/11/2022 04:45 AM       HCG (If Applicable): No results found for: PREGTESTUR, PREGSERUM, HCG, HCGQUANT     ABGs:   Lab Results   Component Value Date/Time    PHART 7.414 06/26/2015 11:10 AM    PO2ART 66.8 06/26/2015 11:10 AM    QPO1RBS 42.0 06/26/2015 11:10 AM    RNY3WDV 26.3 06/26/2015 11:10 AM    BEART 1.5 06/26/2015 11:10 AM    P1NQJFDU 93.4 06/26/2015 11:10 AM        Type & Screen (If Applicable):  No results found for: LABABO, LABRH    Drug/Infectious Status (If Applicable):  No results found for: HIV, HEPCAB    COVID-19 Screening (If Applicable):   Lab Results   Component Value Date/Time    COVID19 NOT DETECTED 12/10/2022 12:57 PM    COVID19 Not Detected 01/28/2021 12:41 PM           Anesthesia Evaluation    Airway: Mallampati: I  TM distance: >3 FB   Neck ROM: full  Mouth opening: > = 3 FB   Dental:    (+) edentulous      Pulmonary: breath sounds clear to auscultation  (+) COPD:            Patient smoked on day of surgery. Cardiovascular:            Rhythm: regular  Rate: normal                    Neuro/Psych:                ROS comment: No residual p cva GI/Hepatic/Renal:             Endo/Other:    (+) malignancy/cancer. Abdominal:             Vascular: Other Findings:           Anesthesia Plan      general     ASA 3       Induction: intravenous. MIPS: Postoperative opioids intended.                         Lana Cm MD   3/7/2023

## 2023-03-07 NOTE — PROGRESS NOTES
Called for low blood pressure and bradycardia given 15mg Ephedrine IV and 35 mg IM. Blood pressure and HR improved, question of possible changes in tele waveform. Resolved with lead placement and scaling on monitor.   Will obtain EKG to verify

## 2023-03-07 NOTE — PROGRESS NOTES
Patient arrived to PACU bay 2, placed on bedside monitor. VSS. Report obtained from OR RN and anesthesia. Patient on O2 100% on RA. Sinus bradycardia on monitor. NG tube @ 54 line, connected to low wall suction. No s/s pain/discomfort at this time.

## 2023-03-07 NOTE — PROGRESS NOTES
Family/friends remain in Pt's room, asking for updates regarding Pt's procedure. No call received from PACU yet. Primary RN called down to PACU. Informed that Pt. is out of surgery, experiencing low BPs in PACU. Will remain in PACU, while Pt continues to recover. Pt's visitors notified. Charge RN called Clinical Leader to see if a physician is able to update visitors.    Charge RN told by Clinical Leader that provider or nurse would be able to update visitors, when available, at [facility]. Visitors escorted to [facility] by primary RN.

## 2023-03-07 NOTE — PROGRESS NOTES
State EKG ordered for rhythm changes seen in PACU.  at bedside for patients blood pressure, MAP of 49. IM ephedrine given. Patient alert and responding. IVF ongoing.

## 2023-03-07 NOTE — PROGRESS NOTES
Patient vital signs now stable. Patient more alert. No c/o pain/discomfort. Fluid bolus complete. Family member to visit at bedside at this time.

## 2023-03-07 NOTE — CARE COORDINATION
[facility] out of network w [employer]; previously thought pt payor was [address]    Referral sent to [place] [facility]; declined    [place] [facility] declined    [facility] declined    [facility] declined    [facility] declined    Kirill Warren RN, BSN CTN  [facility] [phone]

## 2023-03-07 NOTE — PROGRESS NOTES
Hospitalist Progress Note      PCP: Hanna Lucero    Date of Admission: 2/26/2023    Chief Complaint:   Hypoglycemia     Subjective:    Discussed with patient and daughter in law. Plan for OR today. Medications:  Reviewed    Infusion Medications    dextrose      sodium chloride       Scheduled Medications    ceFAZolin  2,000 mg IntraVENous On Call to OR    metroNIDAZOLE  500 mg IntraVENous On Call to OR    lactulose  30 g Oral BID    amLODIPine  5 mg Oral Daily    insulin NPH  14 Units SubCUTAneous QAM    insulin lispro  0-8 Units SubCUTAneous TID WC    insulin lispro  0-4 Units SubCUTAneous Nightly    insulin NPH  6 Units SubCUTAneous Nightly    insulin regular  10 Units SubCUTAneous Once    [Held by provider] ticagrelor  90 mg Oral BID    aspirin  81 mg Oral Daily    atorvastatin  20 mg Oral Daily    calcium carb-cholecalciferol  1 tablet Oral Daily    gabapentin  600 mg Oral TID    lisinopril  40 mg Oral Daily    paliperidone  9 mg Oral QAM    pantoprazole  40 mg Oral Daily    traZODone  200 mg Oral Nightly    sodium chloride flush  5-40 mL IntraVENous 2 times per day    enoxaparin  40 mg SubCUTAneous Daily     PRN Meds: hydrALAZINE, oxyCODONE-acetaminophen, glucose, dextrose bolus **OR** dextrose bolus, glucagon (rDNA), dextrose, clonazePAM, sodium chloride flush, sodium chloride, ondansetron **OR** ondansetron, polyethylene glycol, acetaminophen **OR** acetaminophen      Intake/Output Summary (Last 24 hours) at 3/7/2023 0911  Last data filed at 3/6/2023 2000  Gross per 24 hour   Intake 2840 ml   Output --   Net 2840 ml         Physical Exam Performed:    BP (!) 156/71   Pulse 58   Temp 98.1 °F (36.7 °C) (Oral)   Resp 16   Ht 5' 3\" (1.6 m)   Wt 121 lb 8 oz (55.1 kg)   SpO2 96%   BMI 21.52 kg/m²     General appearance: No apparent distress, appears stated age and cooperative. HEENT: Pupils equal, round, and reactive to light. Conjunctivae/corneas clear.   Neck: Supple, with full range of motion. No jugular venous distention. Trachea midline. Respiratory:  Normal respiratory effort. Clear to auscultation, bilaterally without Rales/Wheezes/Rhonchi. Cardiovascular: Regular rate and rhythm with normal S1/S2 without murmurs, rubs or gallops. Abdomen: Soft nontender. Positive bowel sounds. Musculoskeletal: No clubbing, cyanosis or edema bilaterally. Full range of motion without deformity. Skin: Skin color, texture, turgor normal.  No rashes or lesions. Neurologic:  Neurovascularly intact without any focal sensory/motor deficits. Cranial nerves: II-XII intact, grossly non-focal.  Psychiatric: Alert and oriented x3. Capillary Refill: Brisk, 3 seconds, normal   Peripheral Pulses: +2 palpable, equal bilaterally       Labs:   Recent Labs     03/04/23  1034 03/07/23  0824   WBC 5.0 3.7*   HGB 9.2* 8.9*   HCT 28.7* 27.8*    164       Recent Labs     03/04/23  1012 03/05/23  0618 03/07/23  0629   * 139 138   K 4.5 4.3 5.0   CL 96* 105 102   CO2 26 30 29   BUN 11 14 12   CREATININE 1.0 1.1 1.1   CALCIUM 9.1 9.0 8.8   PHOS  --  4.3  --        No results for input(s): AST, ALT, BILIDIR, BILITOT, ALKPHOS in the last 72 hours. Recent Labs     03/07/23  0824   INR 0.93     Recent Labs     03/04/23  1039   TROPONINI <0.01         Urinalysis:      Lab Results   Component Value Date/Time    NITRU Negative 12/10/2022 01:42 PM    WBCUA 3-5 12/10/2022 01:42 PM    BACTERIA 2+ 12/10/2022 01:42 PM    RBCUA 5-10 12/10/2022 01:42 PM    BLOODU MODERATE 12/10/2022 01:42 PM    SPECGRAV >=1.030 12/10/2022 01:42 PM    GLUCOSEU >=1000 12/10/2022 01:42 PM    GLUCOSEU >=1000 mg/dL 06/07/2010 03:38 PM       Radiology:  XR CHEST PORTABLE   Final Result   No evidence of edema or pneumonia. Mild cardiac silhouette enlargement. CT ABDOMEN PELVIS WO CONTRAST Additional Contrast? None   Final Result   1. No acute abdominal or pelvic findings. 2.  Large amount of formed stool throughout the colon. Correlate with any   evidence of constipation. XR ABDOMEN (KUB) (SINGLE AP VIEW)   Final Result   1. Moderate stool in the colon, correlate with signs of constipation. US RETROPERITONEAL COMPLETE   Final Result   Left kidney is unremarkable         CT HEAD WO CONTRAST   Final Result   No acute intracranial abnormality. Mild cerebral white matter chronic microvascular ischemic disease. Chronic right maxillary sinus opacification in setting of chronic sinusitis. 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 Patient was apparently found unresponsive on the toilet while doing her bowel prep for a colonoscopy procedure scheduled tomorrow with [facility]. She also supposed to get an EGD. Patient reports she remembers getting diaphoretic and weak just prior to using the bathroom. And after she sat on the toilet she became very lightheaded and lost her consciousness. She reports she takes 22 units of Lantus in the morning which she did. Was instructed not to eat anything so she drank some water and Gatorade in the morning. She also proceeded to take 8 units of the short acting in the morning.   Patient reports she is not aware that she is not supposed to take the short acting insulin with a meal if she was not going to eat anything for the meal.  She has been on insulin and a diabetic for about 10 years. EMS was called, and they reported her blood sugar was 40 at the scene. \"    Colon Cancer: New diagnosis. Weight loss, abdominal pain, constipation and elevated CEA. Colonoscopy 2/27, pathology report now finalized and showed invasive Adenocarcinoma. Discussed with General Surgery, plan for OR today. Hypoglycemia due to insulin use with decreased p.o. intake. Blood sugars are labile. Patient switched to NPH. Sliding scale increased. Will monitor closely. NPH adjusted for elevated blood sugars during day. SHARRON with acidosis. 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 Patient was on a bicarb drip. Improved. Continue to monitor renal function. Hypertension. Blood pressure labile. Restarted lisinopril. Amlodipine started.     DVT Prophylaxis: Lovenox  Diet: Diet NPO Exceptions are: Sips of Water with Meds  Code Status: Full Code  PT/OT Eval Status: patient ambulatory at home    Dispo - ?2-3 days    Carson Godwin MD

## 2023-03-07 NOTE — OP NOTE
Date of Surgery: 3/7/23    Preop Dx:  Right sided colon cancer    Postop Dx:  Same    Procedure:  Robotic converted to open right colectomy    Surgeon:  Natalie Ernst    Assistant:      Anesthesia:  GETA    EBL:   50ml    Specimen:  Right colon, distal ileum, appendix    Complications: none    Drains/Lines:  15f channel drain    Indications:  59 yo with right sided colon cancer    Description:  Patient was given adequate description of the risks and rewards of the procedure, including bleeding, infection, possible injury to surrounding structures, and freely consented. Patient was given appropriate antibiotics and brought to the OR where GETA anesthesia was induced. Patient was placed in supine position. Prepped and draped in usual sterile fashion. Using optiview technique 5mm trocar inserted in left side. Abdomen insufflated to 15mmhg pressure. Three 8mm trocasrs inserted and robot docked. Right colon was then mobilized along white line of toldt. The proximal transverse colon was mobilized as well. The corner of the hepatic flexure was fixed and difficult to see and mobilized so I converted ot an open procedure  robot undocked and trocars removed. Incision was made from just below umbilicus to mid upper abdomen in the midline. The incision was carried down through the dermis, subcutaneous fat, and linea alba using electrocautery. Preperitoneal fat was incised using electrocautery. Peritoneum was grasped with pickups. Small incision was made in the peritoneal cavity. The preperitoneal fat and peritoneum were then incised along the length of the incision. The ascending colon was futher freed from its lateral attachments using electrocautery to allow for adequate mobilization. The right ureter was identified and preserved without injury. The hepatic flexure was then mobilized using cautery and sharp dissection. The tumor was at this apex.   It was adherent to the under side of the right lobe of the liver but did not appear to invade it. Several metallic clips were placed here as reference. In its mobilization the colon did open slightly in this location. It was further opened on the back table later to identify the tumor. A site was chosen for distal margin along the transverse colon and a blue load on SERGIO stapler used to transect the colon. A site was chosen in the distal ileum for proximal margin. The mesentery was dissected here with electrocautery allowing for passage of a SERGIO stapler with blue load, which was fired. Using enseal device, the mesentery of the specimen was ligated. The specimen was then sent to pathology. The ileum and colon were then aligned in a tension free manner and a side-to-side functional end-to-end anastomosis performed. The antimesenteric corners of each were cut with curved ford scissors and the anvils of the SERGIO stapler with blue load passed and fired. The resultant enterotomy was closed with another blue load on the SERGIO stapler. The anastomosis was palpated and felt to be of sufficient size. The mesenteric defect was closed with 3-0 silk sutures. The abdomen was the lavaged with normal saline. Hemostasis was assured using cautery. Through a stab incision in right abdomen a 15F channel drain was inserted an dpositioned in right abdomen. It was secured with silk suture to the skin. .   The midline incision was then closed at the fascial level with looped 0-0 PDS suture x 2. Staples were used to close the epidermis. Trocar sites closed with 3-0 vicryl sutures. Sterile dressing placed. All suture, sponge and instrument count correct times two at end of case. Transferred to PACU in stable condition.     Carla Hills MD

## 2023-03-07 NOTE — CONSULTS
Department of General Surgery Consult    PATIENT NAME: Mimi Hawkins   YOB: 1960    ADMISSION DATE: 2/26/2023  7:19 PM      TODAY'S DATE: 3/7/2023    Reason for Consult:  colon cancer    Chief Complaint: hypoglycemia    Requesting Physician:  Israel Espana    HISTORY OF PRESENT ILLNESS:              The patient is a 60 y.o. female who presented with hypoglycemia while doing bowel prep for colonoscopy. Had prior elevated CEA and PET with abnormality in hepatic flexure. Colonoscopy done in January but poor prep. Underwent colonoscopy on 2/27 with poor prep but biopsy done of lesion in hepatic flexure came back positive for colon cancer. Reports progressive weight loss, fatigue and abdominal pain with constipation the past year. Denies blood in stool. Past Medical History:        Diagnosis Date    Abnormal brain MRI 7/20/2017    Partially empty sella and minimal chronic small vessel ischemic disease    Acute bilateral low back pain without sciatica 11/2/2016    SHARRON (acute kidney injury) ([address] 7/5/2017    Arthritis     back    Bipolar disorder ([address] 10/18/2008    CAD (coronary artery disease)     stent placed 6/8/20    Cancer ([address] 2015    bilateral breast:s/p lumpectomy/radiation:under care care of breast specialist:Dr. Boone     Carotid stenosis, bilateral:<50%:per US 7/2016 7/15/2016    Carpal tunnel syndrome 10/18/2008    Cervical cancer screening 2014    Nml per pt'.     Coronary artery disease of native artery of native heart with stable angina pectoris ([address] 6/9/2020    DDD (degenerative disc disease), lumbar 7/18/2018    Depression     under care of pschiatrist:Dr. 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 myelopathy or radiculopathy 3/10/2017    Transient cerebral ischemia 07/15/2016    TIA:7/10/16    Unspecified cerebral artery occlusion with cerebral infarction     TIA       Past Surgical History:        Procedure Laterality Date    BREAST LUMPECTOMY  2015    Bilateral:breast cancer    CARDIAC CATHETERIZATION  06/08/2020    Dr. Rome Velasquez), DAYANA to Diag 1    COLONOSCOPY N/A 2/1/2021    COLONOSCOPY DIAGNOSTIC performed by Ana Corey MD at [facility] N/A 2/27/2023    COLONOSCOPY WITH BIOPSY performed by Ana Corey MD at [place] [address]  2/3/2021    CT BONE MARROW BIOPSY 2/3/2021 Nilton Wild MD [facility] CT SCAN    HYSTERECTOMY (CERVIX STATUS UNKNOWN)      Benign:no cervical cancer per pt'    KIDNEY REMOVAL      right    OTHER SURGICAL HISTORY Right     orif right ankle    TEMPORAL ARTERY BIOPSY Right 8/9/2021    RIGHT TEMPORAL ARTERY BIOPSY LIGATION performed by Chuy Leach MD at [address] N/A 1/29/2021    EGD BIOPSY performed by Ana [facility]y MD at [facility] 12/15/2022    EGD BIOPSY performed by Ana Corey MD at [address]       Current Medications:   Current Facility-Administered Medications: ceFAZolin (ANCEF) 2000 mg in 0.9% sodium chloride 100 mL IVPB, 2,000 mg, IntraVENous, On Call to OR  metronidazole (FLAGYL) 500 mg in 0.9% NaCl 100 mL IVPB premix, 500 mg, IntraVENous, On Call to OR  lactulose (CHRONULAC) 10 GM/15ML solution 30 g, 30 g, Oral, BID  amLODIPine (NORVASC) tablet 5 mg, 5 mg, Oral, Daily  insulin NPH (HumuLIN N;NovoLIN N) injection vial 14 Units, 14 Units, SubCUTAneous, QAM  insulin lispro (HUMALOG) injection vial 0-8 Units, 0-8 Units, SubCUTAneous, TID WC  insulin lispro (HUMALOG) injection vial 0-4 Units, 0-4 Units, SubCUTAneous, Nightly  insulin NPH (HumuLIN N;NovoLIN N) injection vial 6 Units, 6 Units, SubCUTAneous, Nightly  insulin regular (HUMULIN R;NOVOLIN R) injection 10 Units, 10 Units, SubCUTAneous, Once  hydrALAZINE (APRESOLINE) injection 5 mg, 5 mg, IntraVENous, Q4H PRN  oxyCODONE-acetaminophen (PERCOCET) 7.5-325 MG per tablet 1 tablet, 1 tablet, Oral, Q8H PRN  glucose chewable tablet 16 g, 4 tablet, Oral, PRN  dextrose bolus 10% 125 mL, 125 mL, IntraVENous, PRN **OR** dextrose bolus 10% 250 mL, 250 mL, IntraVENous, PRN  glucagon (rDNA) injection 1 mg, 1 mg, IntraMUSCular, PRN  dextrose 10 % infusion, , IntraVENous, Continuous PRN  ticagrelor (BRILINTA) tablet 90 mg, 90 mg, Oral, BID  aspirin EC tablet 81 mg, 81 mg, Oral, Daily  atorvastatin (LIPITOR) tablet 20 mg, 20 mg, Oral, Daily  clonazePAM (KLONOPIN) tablet 0.5 mg, 0.5 mg, Oral, TID PRN  calcium carb-cholecalciferol 250-3. 125 MG-MCG per tab 1 tablet, 1 tablet, Oral, Daily  gabapentin (NEURONTIN) capsule 600 mg, 600 mg, Oral, TID  lisinopril (PRINIVIL;ZESTRIL) tablet 40 mg, 40 mg, Oral, Daily  paliperidone (INVEGA) extended release tablet 9 mg, 9 mg, Oral, QAM  pantoprazole (PROTONIX) tablet 40 mg, 40 mg, Oral, Daily  traZODone (DESYREL) tablet 200 mg, 200 mg, Oral, Nightly  sodium chloride flush 0.9 % injection 5-40 mL, 5-40 mL, IntraVENous, 2 times per day  sodium chloride flush 0.9 % injection 5-40 mL, 5-40 mL, IntraVENous, PRN  0.9 % sodium chloride infusion, , IntraVENous, PRN  enoxaparin (LOVENOX) injection 40 mg, 40 mg, SubCUTAneous, Daily  ondansetron (ZOFRAN-ODT) disintegrating tablet 4 mg, 4 mg, Oral, Q8H PRN **OR** ondansetron (ZOFRAN) injection 4 mg, 4 mg, IntraVENous, Q6H PRN  polyethylene glycol (GLYCOLAX) packet 17 g, 17 g, Oral, Daily PRN  acetaminophen (TYLENOL) tablet 650 mg, 650 mg, Oral, Q6H PRN **OR** acetaminophen (TYLENOL) suppository 650 mg, 650 mg, Rectal, Q6H PRN  Prior to Admission medications    Medication Sig Start Date End Date Taking? Authorizing Provider   acetaminophen (TYLENOL) 500 MG tablet Take 1 tablet by mouth 4 times daily as needed for Pain 1/11/23   RANGEL Valdivia   clonazePAM (KLONOPIN) 0.5 MG tablet Take 1 tablet by mouth 3 times daily as needed for Anxiety for up to 10 days. 12/16/22 12/26/22  Jennifer Gandara MD   gabapentin (NEURONTIN) 600 MG tablet Take 1 tablet by mouth 3 times daily for 10 days. 12/16/22 12/26/22  Jennifer Gandara MD   insulin glargine (LANTUS) 100 UNIT/ML injection vial Inject 30 Units into the skin every morning 12/17/22   Jennifer Gandara MD   insulin lispro, 1 Unit Dial, (HUMALOG/ADMELOG) 100 UNIT/ML SOPN With meals                   - at bedtime    < 150 ---   5 units            0 units  151-200 -- 6 units            0 units  201-250 :  7 units            1 units  251-300:   8 units            2 units  301-350:   9 units            3 units  >350 :       10 units          4 units  Upto 30 units/day 10/27/22   Delfino Truong MD   lisinopril (PRINIVIL;ZESTRIL) 40 MG tablet TAKE 1 TABLET BY MOUTH EVERY DAY 7/22/22   Delfino Truong MD   atorvastatin (LIPITOR) 20 MG tablet TAKE 1 TABLET BY MOUTH EVERY DAY 7/22/22   Delfino Truong MD   blood glucose test strips (TRUE METRIX BLOOD GLUCOSE TEST) strip As needed.   Patient taking differently: 3 each daily 7/22/22   Delfino Truong MD   Insulin Pen Needle 32G X 4 MM MISC 1 each by Does not apply route daily 7/22/22   Delfino Truong MD   Blood Glucose Monitoring Suppl (TRUE METRIX METER) w/Device KIT Used to  check blood sugar 3 times eyad 7/22/22   Delfino Truong MD   ticagrelor (BRILINTA) 90 MG TABS tablet TAKE 1 TABLET BY MOUTH TWICE A DAY 4/18/22   Nivia Gutierrez, MD   paliperidone (INVEGA) 9 MG extended release tablet Take 9 mg by mouth every morning 3/15/21   Historical Provider, MD   pantoprazole (PROTONIX) 40 MG tablet Take 40 mg by mouth daily  4/3/21   Historical Provider, MD   ferrous sulfate (IRON 325) 325 (65 Fe) MG tablet Take 325 mg by mouth daily (with breakfast)    Historical Provider, MD   calcium carbonate-vitamin D (CALTRATE) 600-400 MG-UNIT TABS per tab Take 1 tablet by mouth daily  19   Historical Provider, MD   aspirin 81 MG tablet Take 81 mg by mouth daily    Historical Provider, MD   traZODone (DESYREL) 100 MG tablet Take 200 mg by mouth nightly     Historical Provider, MD        Allergies:  Morphine, Penicillins, Codeine, and Penicillin g    Social History:   Social History     Socioeconomic History    Marital status:      Spouse name: Not on file    Number of children: 1    Years of education: Not on file    Highest education level: Not on file   Occupational History    Occupation:    Tobacco Use    Smoking status: Former     Packs/day: 0.50     Years: 20.00     Pack years: 10.00     Types: Cigarettes, Cigars     Quit date: 7/3/2014     Years since quittin.6    Smokeless tobacco: Never   Vaping Use    Vaping Use: Never used   Substance and Sexual Activity    Alcohol use: No     Alcohol/week: 0.0 standard drinks    Drug use: No    Sexual activity: Not on file   Other Topics Concern    Not on file   Social History Narrative    Not on file     Social Determinants of Health     Financial Resource Strain: Not on file   Food Insecurity: Not on file   Transportation Needs: Not on file   Physical Activity: Not on file   Stress: Not on file   Social Connections: Not on file   Intimate Partner Violence: Not on file   Housing Stability: Not on file         Family History:        Problem Relation Age of Onset    Cancer Mother         breast    Cancer Father     Heart Failure Neg Hx     High Cholesterol Neg Hx     Hypertension Neg Hx Migraines Neg Hx     Rashes/Skin Problems Neg Hx     Seizures Neg Hx     Stroke Neg Hx     Thyroid Disease Neg Hx     Diabetes Neg Hx        REVIEW OF SYSTEMS:  CONSTITUTIONAL:  positive for  fatigue and weight loss  HEENT:  negative  RESPIRATORY:  negative  CARDIOVASCULAR:  negative  GASTROINTESTINAL:  negative except for constipation and abdominal pain  GENITOURINARY:  negative  HEMATOLOGIC/LYMPHATIC:  negative  NEUROLOGICAL:  Negative  * All other ROS reviewed and negative. PHYSICAL EXAM:  VITALS:  BP (!) 156/71   Pulse 58   Temp 98.1 °F (36.7 °C) (Oral)   Resp 16   Ht 5' 3\" (1.6 m)   Wt 121 lb 8 oz (55.1 kg)   SpO2 96%   BMI 21.52 kg/m²   24HR INTAKE/OUTPUT:    I/O last 3 completed shifts: In: 2960 [P.O.:2960]  Out: -   No intake/output data recorded. CONSTITUTIONAL:  alert, no apparent distress and normal weight  EYES:  PERRL, sclera clear  ENT:  Normocephalic,atraumatic, without obvious abnormality  NECK:  supple, symmetrical, trachea midline  LUNGS: Resp effort easy and unlabored, no crackles or wheezing  CARDIOVASCULAR:  NO JVD, regular rate   ABDOMEN:  , normal bowel sounds, soft, non-distended, minimal tender  MUSCULOSKELETAL: No clubbing or cyanosis, 0+ pitting edema lower extremities  NEUROLOGIC:  Mental Status Exam:  Level of Alertness:   awake  PSYCHIATRIC:   person, place, time  SKIN:  no jaundice    DATA:    CBC:   Recent Labs     03/04/23  1034   WBC 5.0   HGB 9.2*   HCT 28.7*        BMP:    Recent Labs     03/04/23  1012 03/05/23  0618 03/07/23  0629   * 139 138   K 4.5 4.3 5.0   CL 96* 105 102   CO2 26 30 29   BUN 11 14 12   CREATININE 1.0 1.1 1.1   GLUCOSE 354* 139* 335*     Hepatic: No results for input(s): AST, ALT, ALB, BILITOT, ALKPHOS in the last 72 hours. Mag:    No results for input(s): MG in the last 72 hours. Phos:     Recent Labs     03/05/23  0618   PHOS 4.3      INR: No results for input(s): INR in the last 72 hours.     Radiology Review: Images personally reviewed by me. CT - constipation, no evidence of metastases      IMPRESSION/RECOMMENDATIONS:    61 yo with right sided colon cancer  I discussed her pathology results with her as well as recommendation for partial colectomy to remove tumor and establish stage. Risks of operation and typical recovery were reviewed.   Plan for OR today    Electronically signed by Francesca Martino, [address] [place], [place] [id number]

## 2023-03-08 LAB
ANION GAP SERPL CALCULATED.3IONS-SCNC: 14 MMOL/L (ref 3–16)
ANION GAP SERPL CALCULATED.3IONS-SCNC: 19 MMOL/L (ref 3–16)
BASOPHILS ABSOLUTE: 0 K/UL (ref 0–0.2)
BASOPHILS RELATIVE PERCENT: 0.1 %
BUN BLDV-MCNC: 19 MG/DL (ref 7–20)
BUN BLDV-MCNC: 24 MG/DL (ref 7–20)
CALCIUM SERPL-MCNC: 8.3 MG/DL (ref 8.3–10.6)
CALCIUM SERPL-MCNC: 8.8 MG/DL (ref 8.3–10.6)
CHLORIDE BLD-SCNC: 98 MMOL/L (ref 99–110)
CHLORIDE BLD-SCNC: 99 MMOL/L (ref 99–110)
CO2: 18 MMOL/L (ref 21–32)
CO2: 24 MMOL/L (ref 21–32)
CREAT SERPL-MCNC: 1.7 MG/DL (ref 0.6–1.2)
CREAT SERPL-MCNC: 2.3 MG/DL (ref 0.6–1.2)
EKG ATRIAL RATE: 68 BPM
EKG DIAGNOSIS: NORMAL
EKG P AXIS: 78 DEGREES
EKG P-R INTERVAL: 146 MS
EKG Q-T INTERVAL: 468 MS
EKG QRS DURATION: 80 MS
EKG QTC CALCULATION (BAZETT): 497 MS
EKG R AXIS: 50 DEGREES
EKG T AXIS: 38 DEGREES
EKG VENTRICULAR RATE: 68 BPM
EOSINOPHILS ABSOLUTE: 0 K/UL (ref 0–0.6)
EOSINOPHILS RELATIVE PERCENT: 0 %
GFR SERPL CREATININE-BSD FRML MDRD: 23 ML/MIN/{1.73_M2}
GFR SERPL CREATININE-BSD FRML MDRD: 33 ML/MIN/{1.73_M2}
GLUCOSE BLD-MCNC: 178 MG/DL (ref 70–99)
GLUCOSE BLD-MCNC: 197 MG/DL (ref 70–99)
GLUCOSE BLD-MCNC: 273 MG/DL (ref 70–99)
GLUCOSE BLD-MCNC: 304 MG/DL (ref 70–99)
GLUCOSE BLD-MCNC: 360 MG/DL (ref 70–99)
GLUCOSE BLD-MCNC: 368 MG/DL (ref 70–99)
GLUCOSE BLD-MCNC: 372 MG/DL (ref 70–99)
GLUCOSE BLD-MCNC: 415 MG/DL (ref 70–99)
HCT VFR BLD CALC: 29.4 % (ref 36–48)
HEMOGLOBIN: 9 G/DL (ref 12–16)
LYMPHOCYTES ABSOLUTE: 0.8 K/UL (ref 1–5.1)
LYMPHOCYTES RELATIVE PERCENT: 7.4 %
MCH RBC QN AUTO: 28 PG (ref 26–34)
MCHC RBC AUTO-ENTMCNC: 30.8 G/DL (ref 31–36)
MCV RBC AUTO: 90.8 FL (ref 80–100)
MONOCYTES ABSOLUTE: 0.6 K/UL (ref 0–1.3)
MONOCYTES RELATIVE PERCENT: 5 %
NEUTROPHILS ABSOLUTE: 9.8 K/UL (ref 1.7–7.7)
NEUTROPHILS RELATIVE PERCENT: 87.5 %
PDW BLD-RTO: 15.2 % (ref 12.4–15.4)
PERFORMED ON: ABNORMAL
PLATELET # BLD: 166 K/UL (ref 135–450)
PMV BLD AUTO: 8.9 FL (ref 5–10.5)
POTASSIUM REFLEX MAGNESIUM: 5.8 MMOL/L (ref 3.5–5.1)
POTASSIUM SERPL-SCNC: 4.9 MMOL/L (ref 3.5–5.1)
RBC # BLD: 3.24 M/UL (ref 4–5.2)
SODIUM BLD-SCNC: 135 MMOL/L (ref 136–145)
SODIUM BLD-SCNC: 137 MMOL/L (ref 136–145)
WBC # BLD: 11.2 K/UL (ref 4–11)

## 2023-03-08 PROCEDURE — 2500000003 HC RX 250 WO HCPCS: Performed by: SURGERY

## 2023-03-08 PROCEDURE — 1200000000 HC SEMI PRIVATE

## 2023-03-08 PROCEDURE — 2580000003 HC RX 258: Performed by: SURGERY

## 2023-03-08 PROCEDURE — 6360000002 HC RX W HCPCS: Performed by: SURGERY

## 2023-03-08 PROCEDURE — 36415 COLL VENOUS BLD VENIPUNCTURE: CPT

## 2023-03-08 PROCEDURE — 6370000000 HC RX 637 (ALT 250 FOR IP): Performed by: SURGERY

## 2023-03-08 PROCEDURE — 6370000000 HC RX 637 (ALT 250 FOR IP): Performed by: INTERNAL MEDICINE

## 2023-03-08 PROCEDURE — 80048 BASIC METABOLIC PNL TOTAL CA: CPT

## 2023-03-08 PROCEDURE — APPSS45 APP SPLIT SHARED TIME 31-45 MINUTES: Performed by: CLINICAL NURSE SPECIALIST

## 2023-03-08 PROCEDURE — 85025 COMPLETE CBC W/AUTO DIFF WBC: CPT

## 2023-03-08 PROCEDURE — 2580000003 HC RX 258: Performed by: CLINICAL NURSE SPECIALIST

## 2023-03-08 RX ORDER — CEFAZOLIN SODIUM IN 0.9 % NACL 2 G/100 ML
2000 PLASTIC BAG, INJECTION (ML) INTRAVENOUS EVERY 8 HOURS
Status: DISCONTINUED | OUTPATIENT
Start: 2023-03-08 | End: 2023-03-08

## 2023-03-08 RX ORDER — SODIUM CHLORIDE 9 MG/ML
INJECTION, SOLUTION INTRAVENOUS CONTINUOUS
Status: DISCONTINUED | OUTPATIENT
Start: 2023-03-08 | End: 2023-03-12

## 2023-03-08 RX ORDER — METRONIDAZOLE 500 MG/100ML
500 INJECTION, SOLUTION INTRAVENOUS EVERY 8 HOURS
Status: COMPLETED | OUTPATIENT
Start: 2023-03-08 | End: 2023-03-13

## 2023-03-08 RX ORDER — CEFAZOLIN SODIUM IN 0.9 % NACL 2 G/100 ML
2000 PLASTIC BAG, INJECTION (ML) INTRAVENOUS EVERY 12 HOURS
Status: COMPLETED | OUTPATIENT
Start: 2023-03-08 | End: 2023-03-12

## 2023-03-08 RX ORDER — ACETAMINOPHEN 325 MG/1
650 TABLET ORAL EVERY 4 HOURS PRN
Status: DISCONTINUED | OUTPATIENT
Start: 2023-03-08 | End: 2023-03-15 | Stop reason: HOSPADM

## 2023-03-08 RX ORDER — POLYETHYLENE GLYCOL 3350 17 G/17G
17 POWDER, FOR SOLUTION ORAL DAILY PRN
Status: DISCONTINUED | OUTPATIENT
Start: 2023-03-08 | End: 2023-03-08

## 2023-03-08 RX ORDER — INSULIN LISPRO 100 [IU]/ML
5 INJECTION, SOLUTION INTRAVENOUS; SUBCUTANEOUS ONCE
Status: COMPLETED | OUTPATIENT
Start: 2023-03-08 | End: 2023-03-08

## 2023-03-08 RX ORDER — 0.9 % SODIUM CHLORIDE 0.9 %
500 INTRAVENOUS SOLUTION INTRAVENOUS ONCE
Status: COMPLETED | OUTPATIENT
Start: 2023-03-08 | End: 2023-03-08

## 2023-03-08 RX ORDER — ACETAMINOPHEN 650 MG/1
650 SUPPOSITORY RECTAL EVERY 4 HOURS PRN
Status: DISCONTINUED | OUTPATIENT
Start: 2023-03-08 | End: 2023-03-15 | Stop reason: HOSPADM

## 2023-03-08 RX ORDER — ENOXAPARIN SODIUM 100 MG/ML
30 INJECTION SUBCUTANEOUS DAILY
Status: DISCONTINUED | OUTPATIENT
Start: 2023-03-09 | End: 2023-03-10

## 2023-03-08 RX ORDER — NALOXONE HYDROCHLORIDE 0.4 MG/ML
INJECTION, SOLUTION INTRAMUSCULAR; INTRAVENOUS; SUBCUTANEOUS PRN
Status: DISCONTINUED | OUTPATIENT
Start: 2023-03-08 | End: 2023-03-08

## 2023-03-08 RX ADMIN — INSULIN LISPRO 5 UNITS: 100 INJECTION, SOLUTION INTRAVENOUS; SUBCUTANEOUS at 06:46

## 2023-03-08 RX ADMIN — METRONIDAZOLE 500 MG: 500 INJECTION, SOLUTION INTRAVENOUS at 09:50

## 2023-03-08 RX ADMIN — INSULIN LISPRO 4 UNITS: 100 INJECTION, SOLUTION INTRAVENOUS; SUBCUTANEOUS at 00:06

## 2023-03-08 RX ADMIN — SODIUM CHLORIDE 500 ML: 9 INJECTION, SOLUTION INTRAVENOUS at 09:23

## 2023-03-08 RX ADMIN — SODIUM CHLORIDE: 9 INJECTION, SOLUTION INTRAVENOUS at 20:27

## 2023-03-08 RX ADMIN — INSULIN LISPRO 6 UNITS: 100 INJECTION, SOLUTION INTRAVENOUS; SUBCUTANEOUS at 11:19

## 2023-03-08 RX ADMIN — PALIPERIDONE 9 MG: 3 TABLET, EXTENDED RELEASE ORAL at 07:45

## 2023-03-08 RX ADMIN — SODIUM CHLORIDE 500 ML: 9 INJECTION, SOLUTION INTRAVENOUS at 13:53

## 2023-03-08 RX ADMIN — PHENOL 1 SPRAY: 1.5 LIQUID ORAL at 17:29

## 2023-03-08 RX ADMIN — GABAPENTIN 600 MG: 300 CAPSULE ORAL at 20:26

## 2023-03-08 RX ADMIN — ACETAMINOPHEN 325MG 650 MG: 325 TABLET ORAL at 13:53

## 2023-03-08 RX ADMIN — ENOXAPARIN SODIUM 40 MG: 100 INJECTION SUBCUTANEOUS at 07:42

## 2023-03-08 RX ADMIN — ATORVASTATIN CALCIUM 20 MG: 10 TABLET, FILM COATED ORAL at 07:44

## 2023-03-08 RX ADMIN — ASPIRIN 81 MG: 81 TABLET, COATED ORAL at 07:44

## 2023-03-08 RX ADMIN — ACETAMINOPHEN 325MG 650 MG: 325 TABLET ORAL at 06:44

## 2023-03-08 RX ADMIN — Medication 2000 MG: at 20:32

## 2023-03-08 RX ADMIN — Medication 2000 MG: at 09:08

## 2023-03-08 RX ADMIN — ACETAMINOPHEN 325MG 650 MG: 325 TABLET ORAL at 17:44

## 2023-03-08 RX ADMIN — PANTOPRAZOLE SODIUM 40 MG: 40 TABLET, DELAYED RELEASE ORAL at 07:44

## 2023-03-08 RX ADMIN — INSULIN LISPRO 8 UNITS: 100 INJECTION, SOLUTION INTRAVENOUS; SUBCUTANEOUS at 09:16

## 2023-03-08 RX ADMIN — SODIUM CHLORIDE: 9 INJECTION, SOLUTION INTRAVENOUS at 07:40

## 2023-03-08 RX ADMIN — TRAZODONE HYDROCHLORIDE 200 MG: 50 TABLET ORAL at 20:26

## 2023-03-08 RX ADMIN — LACTULOSE 30 G: 10 SOLUTION ORAL at 20:41

## 2023-03-08 RX ADMIN — PHENOL 1 SPRAY: 1.5 LIQUID ORAL at 20:25

## 2023-03-08 RX ADMIN — INSULIN HUMAN 14 UNITS: 100 INJECTION, SUSPENSION SUBCUTANEOUS at 09:16

## 2023-03-08 RX ADMIN — METRONIDAZOLE 500 MG: 500 INJECTION, SOLUTION INTRAVENOUS at 17:34

## 2023-03-08 ASSESSMENT — PAIN DESCRIPTION - DESCRIPTORS
DESCRIPTORS: ACHING
DESCRIPTORS: SORE
DESCRIPTORS: ACHING

## 2023-03-08 ASSESSMENT — PAIN SCALES - GENERAL
PAINLEVEL_OUTOF10: 0
PAINLEVEL_OUTOF10: 4
PAINLEVEL_OUTOF10: 10
PAINLEVEL_OUTOF10: 10
PAINLEVEL_OUTOF10: 9
PAINLEVEL_OUTOF10: 10
PAINLEVEL_OUTOF10: 9

## 2023-03-08 ASSESSMENT — PAIN DESCRIPTION - LOCATION
LOCATION: THROAT
LOCATION: ABDOMEN
LOCATION: THROAT
LOCATION: ABDOMEN

## 2023-03-08 ASSESSMENT — PAIN - FUNCTIONAL ASSESSMENT
PAIN_FUNCTIONAL_ASSESSMENT: ACTIVITIES ARE NOT PREVENTED
PAIN_FUNCTIONAL_ASSESSMENT: PREVENTS OR INTERFERES SOME ACTIVE ACTIVITIES AND ADLS

## 2023-03-08 ASSESSMENT — PAIN DESCRIPTION - ORIENTATION
ORIENTATION: MID
ORIENTATION: RIGHT;LEFT;UPPER;MID
ORIENTATION: MID
ORIENTATION: MID;LEFT

## 2023-03-08 NOTE — CONSULTS
[facility]    Consult Note      Requesting Physician:  Dr. Sasha Simons, Patients [facility] jefferson    CHIEF COMPLAINT:  probable colon cancer      HISTORY OF PRESENT ILLNESS:    Ms. Joycelyn Daly  is a 65-year-old initially admitted for back pain. CT scan questions metastatic lesions in the spine that turned out to be negative. It was noted that her CEA was over 20 and rising. Myeloma work-up was negative a recent colonoscopy was negative, but it was a poor prep. A follow-up colonoscopy on 1/23/2023 also was not a great prep but there is no evidence of colon cancer. The patient elected not to do an ERCP. PET scan was eventually done which lit up at the hepatic flexure. She was sent back for a colonoscopy which showed a near obstructing tumor. She is now status post surgery. She is doing relatively well. She has mild dementia, but knows where she is. She does not give a great history. ICD-10-CM    1. Hypoglycemia  E16.2       2. Confusion  R41.0       3. Screening for colon cancer  Z12.11 Surgical Pathology     Surgical Pathology      4.  Malignant neoplasm of hepatic flexure [facility])  C18.3 Surgical Pathology     Surgical Pathology     Surgical Pathology     Surgical Pathology             Past Medical History:  Past Medical History:   Diagnosis Date    Abnormal brain MRI 7/20/2017    Partially empty sella and minimal chronic small vessel ischemic disease    Acute bilateral low back pain without sciatica 11/2/2016    SHARRON (acute kidney injury) ([address] 7/5/2017    Arthritis     back    Bipolar disorder ([address] 10/18/2008    CAD (coronary artery disease)     stent placed 6/8/20    Cancer ([address] 2015    bilateral breast:s/p lumpectomy/radiation:under care care of breast specialist:Dr. Boone     Carotid stenosis, bilateral:<50%:per US 7/2016 7/15/2016    Carpal tunnel syndrome 10/18/2008 Cervical cancer screening 2014    Nml per pt'. Coronary artery disease of native artery of native heart with stable angina pectoris ([facility] 6/9/2020    DDD (degenerative disc disease), lumbar 7/18/2018    Depression     under care of pschiatrist:Dr. Jason Lawler    Depression/anxiety 7/5/2017    Depression/anxiety     Diabetes mellitus ([facility]     Gout     History of mammogram 10/28/2016;8/14/17    Negative    History of therapeutic radiation     Hyperlipidemia     Hypertension     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 stage 3 chronic kidney disease ([facility] 9/17/2017    Microalbuminuria 7/1/2016    Neuropathy in diabetes [facility])     Non morbid obesity 7/1/2016    Pancreatitis 5/12/16    [facility] hospitalization 5/12/16-5/16/16:under care of GI:chronic pancreatitis    S/P endoscopy 6/14/2016    [facility]:per pt' & her family member was nml.     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 myelopathy or radiculopathy 3/10/2017    Transient cerebral ischemia 07/15/2016    TIA:7/10/16    Unspecified cerebral artery occlusion with cerebral infarction     TIA       Past Surgical History:  Past Surgical History:   Procedure Laterality Date    BREAST LUMPECTOMY  2015    Bilateral:breast cancer    CARDIAC CATHETERIZATION  06/08/2020    Dr. Raphael Segura), DAYANA to Diag 1    COLONOSCOPY N/A 2/1/2021    COLONOSCOPY DIAGNOSTIC performed by Pk Erickson MD at [facility] 2/27/2023    COLONOSCOPY WITH BIOPSY performed by Pk Erickson MD at [place] [address]  2/3/2021    CT BONE MARROW BIOPSY 2/3/2021 Lorie Franco MD [facility] CT SCAN    HYSTERECTOMY (CERVIX STATUS UNKNOWN)      Benign:no cervical cancer per pt'    KIDNEY REMOVAL      right    OTHER SURGICAL HISTORY Right     orif right ankle    TEMPORAL ARTERY BIOPSY Right 8/9/2021    RIGHT TEMPORAL ARTERY BIOPSY LIGATION performed by Harry Fabian MD at [address] [place] [address] UPPER GASTROINTESTINAL ENDOSCOPY N/A 1/29/2021    EGD BIOPSY performed by Rudy Hennessy MD at [address] 12/15/2022    EGD BIOPSY performed by Rudy Hennessy MD at [address]       Current Medications:  Current Facility-Administered Medications   Medication Dose Route Frequency Provider Last Rate Last Admin    ceFAZolin (ANCEF) 2000 mg in 0.9% sodium chloride 100 mL IVPB  2,000 mg IntraVENous Q8H Tiburcio Eduardo MD        metronidazole (FLAGYL) 500 mg in 0.9% NaCl 100 mL IVPB premix  500 mg IntraVENous Q8H Tiburcio Eduardo MD        naloxone 0.4 mg in 10 mL sodium chloride syringe   IntraVENous PRN Tiburcio Eduardo MD        polyethylene glycol (GLYCOLAX) packet 17 g  17 g Oral Daily PRN Tiburcio Eduardo MD        HYDROmorphone (DILAUDID) 1 mg/mL PCA   IntraVENous Continuous Tiburcio Eduardo MD        0.9 % sodium chloride infusion   IntraVENous Continuous Tiburcio Eduardo  mL/hr at 03/08/23 0740 New Bag at 03/08/23 0740    phenol 1.4 % mouth spray 1 spray  1 spray Mouth/Throat Q2H PRN Vale Fuller,         lactulose (CHRONULAC) 10 GM/15ML solution 30 g  30 g Oral BID Tiburcio Eduardo MD   30 g at 03/06/23 0851    amLODIPine (NORVASC) tablet 5 mg  5 mg Oral Daily Tiburcio Eduardo MD   5 mg at 03/07/23 0935    insulin NPH (HumuLIN N;NovoLIN N) injection vial 14 Units  14 Units SubCUTAneous QAM Tiburcio Eduardo MD   14 Units at 03/06/23 0837    insulin lispro (HUMALOG) injection vial 0-8 Units  0-8 Units SubCUTAneous TID WC Tiburcio Eduardo MD   6 Units at 03/06/23 1239    insulin lispro (HUMALOG) injection vial 0-4 Units  0-4 Units SubCUTAneous Nightly Tiburcio Eduardo MD   4 Units at 03/08/23 0006    insulin NPH (HumuLIN N;NovoLIN N) injection vial 6 Units  6 Units SubCUTAneous Nightly Tiburcio Eduardo MD   6 Units at 03/06/23 2041    insulin regular (HUMULIN R;NOVOLIN R) injection 10 Units  10 Units SubCUTAneous Once Ilah Cipro Dipika Pickens MD        hydrALAZINE (APRESOLINE) injection 5 mg  5 mg IntraVENous Q4H PRN John Frankel MD   5 mg at 03/04/23 0904    glucose chewable tablet 16 g  4 tablet Oral PRN John Frankel MD        dextrose bolus 10% 125 mL  125 mL IntraVENous PRN John Frankel MD        Or    dextrose bolus 10% 250 mL  250 mL IntraVENous PRN John Frankel MD        glucagon (rDNA) injection 1 mg  1 mg IntraMUSCular PRN John Frankel MD        dextrose 10 % infusion   IntraVENous Continuous PRN John Frankel MD        aspirin EC tablet 81 mg  81 mg Oral Daily John Frankel MD   81 mg at 03/08/23 0744    atorvastatin (LIPITOR) tablet 20 mg  20 mg Oral Daily John Frankel MD   20 mg at 03/08/23 0744    clonazePAM (KLONOPIN) tablet 0.5 mg  0.5 mg Oral TID PRN John Frankel MD   0.5 mg at 03/06/23 1644    calcium carb-cholecalciferol 250-3. 125 MG-MCG per tab 1 tablet  1 tablet Oral Daily John Frankel MD   1 tablet at 03/07/23 0935    gabapentin (NEURONTIN) capsule 600 mg  600 mg Oral TID John Frankel MD   600 mg at 03/07/23 2353    lisinopril (PRINIVIL;ZESTRIL) tablet 40 mg  40 mg Oral Daily John Frankel MD   40 mg at 03/07/23 0935    paliperidone (INVEGA) extended release tablet 9 mg  9 mg Oral QAM John Frankel MD   9 mg at 03/08/23 0745    pantoprazole (PROTONIX) tablet 40 mg  40 mg Oral Daily John Frankel MD   40 mg at 03/08/23 0744    traZODone (DESYREL) tablet 200 mg  200 mg Oral Nightly John Frankel MD   200 mg at 03/07/23 2354    sodium chloride flush 0.9 % injection 5-40 mL  5-40 mL IntraVENous 2 times per day John Frankel MD   10 mL at 03/07/23 2355    sodium chloride flush 0.9 % injection 5-40 mL  5-40 mL IntraVENous PRN John Frankel MD   10 mL at 03/04/23 0904    0.9 % sodium chloride infusion   IntraVENous PRN John Frankel MD        enoxaparin (LOVENOX) injection 40 mg  40 mg SubCUTAneous Daily John Frankel MD   09 mg at 23 0742    ondansetron (ZOFRAN-ODT) disintegrating tablet 4 mg  4 mg Oral Q8H PRN Tiburcio Eduardo MD        Or    ondansetron [facility] injection 4 mg  4 mg IntraVENous Q6H PRN Tiburcio Eduardo MD   4 mg at 23 1035    polyethylene glycol (GLYCOLAX) packet 17 g  17 g Oral Daily PRN Tiburcio Eduardo MD        acetaminophen (TYLENOL) tablet 650 mg  650 mg Oral Q6H PRN Tbiurcio Eduardo MD   650 mg at 23 0644    Or    acetaminophen (TYLENOL) suppository 650 mg  650 mg Rectal Q6H PRN Tiburcio Eduardo MD           Allergies:   Allergies   Allergen Reactions    Morphine Anaphylaxis and Hives     feels like throat is closing    Penicillins Hives and Swelling    Codeine Hives and Rash    Penicillin G Rash       Social History:  Social History     Socioeconomic History    Marital status:      Spouse name: Not on file    Number of children: 1    Years of education: Not on file    Highest education level: Not on file   Occupational History    Occupation:    Tobacco Use    Smoking status: Former     Packs/day: 0.50     Years: 20.00     Pack years: 10.00     Types: Cigarettes, Cigars     Quit date: 7/3/2014     Years since quittin.6    Smokeless tobacco: Never   Vaping Use    Vaping Use: Never used   Substance and Sexual Activity    Alcohol use: No     Alcohol/week: 0.0 standard drinks    Drug use: No    Sexual activity: Not on file   Other Topics Concern    Not on file   Social History Narrative    Not on file     Social Determinants of Health     Financial Resource Strain: Not on file   Food Insecurity: Not on file   Transportation Needs: Not on file   Physical Activity: Not on file   Stress: Not on file   Social Connections: Not on file   Intimate Partner Violence: Not on file   Housing Stability: Not on file          Family History:  Family History   Problem Relation Age of Onset    Cancer Mother         breast    Cancer Father     Heart Failure Neg Hx     High Cholesterol Neg Hx     Hypertension Neg Hx     Migraines Neg Hx     Rashes/Skin Problems Neg Hx     Seizures Neg Hx     Stroke Neg Hx     Thyroid Disease Neg Hx     Diabetes Neg Hx        REVIEW OF SYSTEMS:      Constitutional: Denies fever, sweats, weight loss  Eyes: No visual changes or diplopia. No scleral icterus. Ent: No headaches, hearing loss or vertigo. No mouth sores or sore throat. Cardiovascular: No chest pain, dyspnea on exertion, palpitations or loss of consciousness. Respiratory: No cough or wheezing, no sputum production. No hemoptysis. Gastrointestinal: No abdominal pain, appetite loss, blood in stools. No change in bowel habits. Genitourinary: No dysuria, trouble voiding, or hematuria. Musculoskeletal: No generalized weakness. No joint complaints. Integumentary: No rash or pruritus. Neurological: There are some memory issues. Endocrine: No temperature intolerance. No excessive thirst, fluid intake, urination. Hematologic/lymphatic: No abnormal bruising or ecchymosis, blood clots or swollen lymph nodes. Allergic/immunologic: No nasal congestion or hives.         PHYSICAL EXAM:      Vitals:  Patient Vitals for the past 24 hrs:   BP Temp Temp src Pulse Resp SpO2   03/08/23 0747 (!) 92/57 98.1 °F (36.7 °C) Oral (!) 104 16 97 %   03/08/23 0744 (!) 92/57 -- -- -- -- --   03/08/23 0530 (!) 94/57 98 °F (36.7 °C) Oral 100 18 96 %   03/07/23 2349 109/68 97.3 °F (36.3 °C) Oral 97 18 98 %   03/07/23 2330 108/65 97.1 °F (36.2 °C) Oral 93 18 --   03/07/23 2127 101/62 -- -- 81 18 --   03/07/23 2115 (!) 95/59 (!) 94 °F (34.4 °C) Rectal 80 18 --   03/07/23 2102 96/60 -- -- 78 -- 97 %   03/07/23 2045 (!) 86/55 -- -- 76 -- 99 %   03/07/23 2015 101/62 (!) 94.4 °F (34.7 °C) Oral 76 18 95 %   03/07/23 1955 -- -- -- 78 -- 98 %   03/07/23 1950 (!) 126/51 -- -- 76 -- 97 %   03/07/23 1945 (!) 125/49 -- -- 75 -- 98 %   03/07/23 1940 (!) 122/50 -- -- 73 -- 98 %   03/07/23 1935 (!) 114/48 -- -- 71 -- 98 %   03/07/23 1930 (!) 106/46 -- -- 70 -- 99 %   03/07/23 1925 (!) 95/47 -- -- 73 -- 99 %   03/07/23 1920 (!) 93/46 -- -- 74 -- 99 %   03/07/23 1915 (!) 92/45 -- -- 73 -- 99 %   03/07/23 1910 (!) 91/47 -- -- 72 -- 100 %   03/07/23 1905 (!) 99/47 -- -- 74 -- 100 %   03/07/23 1900 (!) 105/46 -- -- 73 -- 99 %   03/07/23 1855 (!) 106/49 -- -- 73 -- 100 %   03/07/23 1850 (!) 116/53 -- -- 74 -- 100 %   03/07/23 1845 (!) 123/57 -- -- 72 -- 100 %   03/07/23 1840 (!) 125/55 -- -- 72 -- 100 %   03/07/23 1835 (!) 130/54 -- -- 69 -- 100 %   03/07/23 1830 (!) 129/49 -- -- 68 -- 100 %   03/07/23 1825 -- -- -- 64 -- 100 %   03/07/23 1820 (!) 110/51 -- -- 60 -- 100 %   03/07/23 1815 -- -- -- 50 -- 98 %   03/07/23 1810 (!) 78/39 -- -- (!) 49 -- 100 %   03/07/23 1805 (!) 80/40 -- -- 51 -- 100 %   03/07/23 1800 (!) 88/42 -- -- 53 -- 100 %   03/07/23 1755 (!) 112/45 96.8 °F (36 °C) Temporal 57 15 98 %   03/07/23 1750 (!) 103/54 -- -- 51 -- 97 %   03/07/23 1745 -- -- -- 52 -- 97 %   03/07/23 1740 (!) 100/50 -- -- 55 -- 96 %   03/07/23 1735 93/61 -- -- 57 -- 97 %   03/07/23 1730 (!) 116/51 -- -- 58 -- 95 %   03/07/23 1725 (!) 113/56 -- -- 59 -- 100 %   03/07/23 1720 (!) 126/55 (!) 96 °F (35.6 °C) Temporal 59 15 100 %   03/07/23 1253 (!) 178/55 98 °F (36.7 °C) Temporal 53 16 99 %       Date 03/08/23 0000 - 03/08/23 2359   Shift 2810-1605 9664-6933 1905-8485 24 Hour Total   INTAKE   Shift Total(mL/kg)       OUTPUT   Emesis/NG output(mL/kg) 400(7.3) 200(3.6)  600(10.9)   Shift Total(mL/kg) 400(7.3) 200(3.6)  600(10.9)   Weight (kg) 55.1 55.1 55.1 55.1       CONSTITUTIONAL: awake, alert, cooperative, no apparent distress   EYES: pupils equal, round and reactive to light, sclera clear and conjunctiva normal  ENT: Normocephalic, without obvious abnormality, atraumatic. NG tube in place.   NECK: supple, symmetrical, no jugular venous distension and no carotid bruits   HEMATOLOGIC/LYMPHATIC: no cervical, supraclavicular or axillary lymphadenopathy   LUNGS: no increased work of breathing and clear to auscultation   CARDIOVASCULAR: regular rate and rhythm, normal S1 and S2, no murmur noted  ABDOMEN: normal bowel sounds x 4, soft, non-distended, non-tender, no masses palpated, no hepatosplenomegaly   MUSCULOSKELETAL: full range of motion noted, tone is normal  NEUROLOGIC: awake, alert, oriented to name, place and time. Motor skills grossly intact. SKIN: Normal skin color, texture, turgor and no jaundice. appears intact   EXTREMITIES: no LE edema       DATA:    PT/INR:    Recent Labs     03/07/23  0824 02/26/23  1930 02/10/23  2123   PROT  --  7.1 7.0   INR 0.93  --   --      PTT:  No results for input(s): APTT in the last 720 hours. CMP:    Recent Labs     03/08/23  0533 02/27/23  0710 02/26/23 1930   *   < > 135*   K 5.8*   < > 3.6   CL 98*   < > 103   CO2 18*   < > 19*   BUN 19   < > 13   PROT  --   --  7.1    < > = values in this interval not displayed. Mg:  No results for input(s): MG in the last 720 hours. Lab Results   Component Value Date    CALCIUM 8.8 03/08/2023    PHOS 4.3 03/05/2023       CBC:    Recent Labs     03/07/23  0824 03/04/23  1034 03/01/23  0428 02/28/23  0338 02/26/23  1930 02/10/23  2123   WBC 3.7* 5.0 4.7 4.3 9.3 5.1   NEUTROABS 2.0  --   --  2.4 7.0 2.9   LYMPHOPCT 34.5  --   --  34.1 17.9 35.4   RBC 3.14* 3.24* 3.11* 3.06* 3.66* 3.42*   HGB 8.9* 9.2* 8.5* 8.5* 10.1* 9.2*   HCT 27.8* 28.7* 27.8* 27.4* 33.0* 30.2*   MCV 88.5 88.5 89.4 89.5 90.0 88.3   MCH 28.2 28.4 27.2 28.0 27.5 27.0   MCHC 31.9 32.1 30.5* 31.3 30.5* 30.6*   RDW 14.4 13.9 14.0 14.1 14.1 14.1    173 147 143 161 171        LDH:No results for input(s): LDH in the last 720 hours.       Radiology Review:  XR CHEST PORTABLE  Narrative: EXAMINATION:  ONE XRAY VIEW OF THE CHEST    3/4/2023 10:40 am    COMPARISON:  2/10/2023    HISTORY:  ORDERING SYSTEM PROVIDED HISTORY: shortness of breath  TECHNOLOGIST PROVIDED HISTORY:  Reason for exam:->shortness of breath  Reason for Exam: SOB    FINDINGS:  The cardiac silhouette is mildly enlarged. There is no pneumothorax, edema  or focal consolidation. Clips are noted in the upper abdomen. There is levocurvature of the upper  thoracic spine. Impression: No evidence of edema or pneumonia. Mild cardiac silhouette enlargement. Problem List  Patient Active Problem List   Diagnosis    Acute hyperglycemia    Hypertension, uncontrolled    Bilateral malignant neoplasm of breast in female ([address]    Microalbuminuria    Obesity (BMI 30-39. 9)    Slurred speech    Hx of ischemic CVA    Brain metastases (HCC)    Carotid stenosis, bilateral:<50%:per US 7/2016    SHARRON (acute kidney injury) ([address]    Lactic acidosis    Frequent falls    Depression/anxiety    Abnormal brain MRI    Acute bilateral low back pain without sciatica    Closed fracture of right ankle, with routine healing, subsequent encounter    Stage 3a chronic kidney disease ([address]    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 ([address]    Dyslipidemia associated with type 2 diabetes mellitus ([address]    Bipolar disorder ([address]    Cardiomegaly    Cardiomyopathy ([address]    Carpal tunnel syndrome    Chronic systolic heart failure (HCC)    Diffuse cystic mastopathy    Edema    Gallstone pancreatitis    Gout    History of breast cancer    History of renal cell carcinoma    Cardiomyopathy in other diseases classified elsewhere    Mononeuritis    Myalgia and myositis    Nonspecific abnormal electrocardiogram (ECG) (EKG)    Patient in clinical research study    Peroneal muscular atrophy    Scoliosis (and kyphoscoliosis), idiopathic    SOBOE (shortness of breath on exertion)    Syncope and collapse    Chronic pain disorder    DDD (degenerative disc disease), lumbar    Diabetic polyneuropathy associated with type 2 diabetes mellitus ([address]    Other secondary scoliosis, lumbosacral region    Thoracic spondylosis without myelopathy    Morbid obesity due to excess calories ([address] Age-related nuclear cataract of both eyes    Hypermetropia, bilateral    Hypertensive retinopathy, bilateral    Vitreous degeneration, bilateral    Acute bilateral low back pain with left-sided sciatica    History of CVA (cerebrovascular accident) without residual deficits    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 stage 3 chronic kidney disease (HCC)    S/P mastectomy, right    Acute cystitis without hematuria    Hypomagnesemia    Chest pain    CAD S/P percutaneous coronary angioplasty    Hyperglycemia due to type 2 diabetes mellitus ([address]    Hx of heart artery stent    Nuclear senile cataract    Unintentional weight loss    Chronic anemia    Facial abscess    Asymptomatic bacteriuria    Acute encephalopathy    Tobacco use disorder    Severe malnutrition (HCC)    Acute right eye pain    Suspected condition    Bipolar affective disorder, currently depressed, moderate (HCC)    Seasonal allergies    Symptomatic bradycardia    Dyspnea    Diffuse pain    Hypoglycemia due to insulin    Screening for colon cancer       IMPRESSION/RECOMMENDATIONS:    Probable colon carcinoma:  -Less likely metastatic breast cancer  -Patient with a rising CEA  -Colonoscopy done several times with a poor prep  -PET scan demonstrated FDG avidity at the hepatic flexure  -Status post surgery with pathology pending  -She has an NG in place but appears to be doing well    Wilms tumor:  -Surgery at age 4  -Pathology not available    Breast carcinoma:  -Diagnosis 9/24/2014  -Right breast  -T1c, N1A, M0, grade 1  -ER positive, PA positive and HER2/edward negative  -Oncotype Dx of 8  -Patient stopped letrozole prematurely due to hot flashes    Dementia:  -Mild per granddaughter  -She does know where she is today    Bipolar disorder:  -Stable    Type 2 diabetes  -Managed by the hospitalist      Thank you for asking me to see the patient.   -Further recommendations when the pathology returns      Charissa Green Sekou Soto MD  Please Contact Through [employer]

## 2023-03-08 NOTE — PROGRESS NOTES
Note from Pharmacy    Reply: The lactulose is in the drop off bin and this should be in the fridge. Unless they left her NPH on B3 since that was where she was at the start of the day yesterday.    Status: Done Today 0019 by Alexis Console

## 2023-03-08 NOTE — CONSULTS
Consult placed    Who:butt  Date:3/8/2023,  Time:3:04 PM        Electronically signed by Alex Jauregui on 3/8/2023 at 3:04 PM

## 2023-03-08 NOTE — ANESTHESIA POSTPROCEDURE EVALUATION
Department of Anesthesiology  Postprocedure Note    Patient: Gilmer Alejandro  MRN: [id number]  YOB: 1960  Date of evaluation: 3/7/2023      Procedure Summary     Date: 03/07/23 Room / Location: [address] [place] [address]    Anesthesia Start: 1683 Anesthesia Stop: 8355    Procedure: ROBOTIC CONVERTED TO OPEN RIGHT COLECTOMY (Abdomen) Diagnosis:       Malignant neoplasm of hepatic flexure ([address]      (COLON CANCER)    Surgeons: Dhiraj Meier MD Responsible Provider: Stephanie Estrada MD    Anesthesia Type: general ASA Status: 3          Anesthesia Type: No value filed.     Marnie Phase I: Marnie Score: 10    Marnie Phase II:        Anesthesia Post Evaluation    Comments: Postoperative Anesthesia Note    Name:    Gilmer Alejandro  MRN:      [id number]    Patient Vitals in the past 12 hrs:  03/07/23 1955, Pulse:78, SpO2:98 %  03/07/23 1950, BP:(!) 126/51, Pulse:76, SpO2:97 %  03/07/23 1945, BP:(!) 125/49, Pulse:75, SpO2:98 %  03/07/23 1940, BP:(!) 122/50, Pulse:73, SpO2:98 %  03/07/23 1935, BP:(!) 114/48, Pulse:71, SpO2:98 %  03/07/23 1930, BP:(!) 106/46, Pulse:70, SpO2:99 %  03/07/23 1925, BP:(!) 95/47, Pulse:73, SpO2:99 %  03/07/23 1920, BP:(!) 93/46, Pulse:74, SpO2:99 %  03/07/23 1915, BP:(!) 92/45, Pulse:73, SpO2:99 %  03/07/23 1910, BP:(!) 91/47, Pulse:72, SpO2:100 %  03/07/23 1905, BP:(!) 99/47, Pulse:74, SpO2:100 %  03/07/23 1900, BP:(!) 105/46, Pulse:73, SpO2:99 %  03/07/23 1855, BP:(!) 106/49, Pulse:73, SpO2:100 %  03/07/23 1850, BP:(!) 116/53, Pulse:74, SpO2:100 %  03/07/23 1845, BP:(!) 123/57, Pulse:72, SpO2:100 %  03/07/23 1840, BP:(!) 125/55, Pulse:72, SpO2:100 %  03/07/23 1835, BP:(!) 130/54, Pulse:69, SpO2:100 %  03/07/23 1830, BP:(!) 129/49, Pulse:68, SpO2:100 %  03/07/23 1825, Pulse:64, SpO2:100 %  03/07/23 1820, BP:(!) 110/51, Pulse:60, SpO2:100 %  03/07/23 1815, Pulse:50, SpO2:98 %  03/07/23 1810, BP:(!) 78/39, Pulse:(!) 49, SpO2:100 %  03/07/23 1805, BP:(!) 80/40, Pulse:51, SpO2:100 %  03/07/23 1800, BP:(!) 88/42, Pulse:53, SpO2:100 %  03/07/23 1755, BP:(!) 112/45, Temp:96.8 °F (36 °C), Temp src:Temporal, Pulse:57, Resp:15, SpO2:98 %  03/07/23 1750, BP:(!) 103/54, Pulse:51, SpO2:97 %  03/07/23 1745, Pulse:52, SpO2:97 %  03/07/23 1740, BP:(!) 100/50, Pulse:55, SpO2:96 %  03/07/23 1735, BP:93/61, Pulse:57, SpO2:97 %  03/07/23 1730, BP:(!) 116/51, Pulse:58, SpO2:95 %  03/07/23 1725, BP:(!) 113/56, Pulse:59, SpO2:100 %  03/07/23 1720, BP:(!) 126/55, Temp:(!) 96 °F (35.6 °C), Temp src:Temporal, Pulse:59, Resp:15, SpO2:100 %  03/07/23 1253, BP:(!) 178/55, Temp:98 °F (36.7 °C), Temp src:Temporal, Pulse:53, Resp:16, SpO2:99 %     LABS:    CBC  Lab Results       Component                Value               Date/Time                  WBC                      3.7 (L)             03/07/2023 08:24 AM        HGB                      8.9 (L)             03/07/2023 08:24 AM        HCT                      27.8 (L)            03/07/2023 08:24 AM        PLT                      164                 03/07/2023 08:24 AM   RENAL  Lab Results       Component                Value               Date/Time                  NA                       138                 03/07/2023 06:29 AM        K                        5.0                 03/07/2023 06:29 AM        CL                       102                 03/07/2023 06:29 AM        CO2                      29                  03/07/2023 06:29 AM        BUN                      12                  03/07/2023 06:29 AM        CREATININE               1.1                 03/07/2023 06:29 AM        GLUCOSE                  335 (H)             03/07/2023 06:29 AM   COAGS  Lab Results       Component                Value               Date/Time                  PROTIME                  12.4                03/07/2023 08:24 AM        INR                      0.93                03/07/2023 08:24 AM        APTT                     24.8                12/11/2022 04:45 AM Intake & Output:  @24HRIO@    Nausea & Vomiting:  No    Level of Consciousness:  Awake    Pain Assessment:  Adequate analgesia    Anesthesia Complications:  No apparent anesthetic complications    SUMMARY      Vital signs stable  OK to discharge from Stage I post anesthesia care.   Care transferred from Anesthesiology department on discharge from perioperative area

## 2023-03-08 NOTE — PROGRESS NOTES
[facility]    SURGERY ADDENDUM  3/8/2023 3:00 PM     Labs reviewed:     Nephrology consulted for further management of worsening SHARRON. Repeat labs ordered for AM.   Additional fluid bolus already ordered earlier by Dr. Nette Bertrand. Discussed with Dr. Nette Bertrand.      CBC:   Lab Results   Component Value Date/Time    WBC 11.2 03/08/2023 01:53 PM    RBC 3.24 03/08/2023 01:53 PM    HGB 9.0 03/08/2023 01:53 PM    HCT 29.4 03/08/2023 01:53 PM    MCV 90.8 03/08/2023 01:53 PM    MCH 28.0 03/08/2023 01:53 PM    MCHC 30.8 03/08/2023 01:53 PM    RDW 15.2 03/08/2023 01:53 PM     03/08/2023 01:53 PM    MPV 8.9 03/08/2023 01:53 PM     BMP:    Lab Results   Component Value Date/Time     03/08/2023 01:53 PM    K 4.9 03/08/2023 01:53 PM    K 5.8 03/08/2023 05:33 AM    CL 99 03/08/2023 01:53 PM    CO2 24 03/08/2023 01:53 PM    BUN 24 03/08/2023 01:53 PM    LABALBU 2.9 03/05/2023 06:18 AM    CREATININE 2.3 03/08/2023 01:53 PM    CALCIUM 8.3 03/08/2023 01:53 PM    GFRAA >60 09/27/2022 09:41 AM    GFRAA >60 05/29/2013 01:53 PM    LABGLOM 23 03/08/2023 01:53 PM    GLUCOSE 273 03/08/2023 01:53 PM         SARTHAK Mendoza - CNP

## 2023-03-08 NOTE — PROGRESS NOTES
Assessment completed and documented. VSS,see flowsheets. A/ox4, drowsy/lethargic this morning. This morning patient states pain was 2/10 after moving up in bed and repositioning patient she stated her pain was \"higher and hurt\", but did not specify number, fell asleep shortly after this conversation. Patient c/o 9/10 pain in her throat from NGT and abd pain, tylenol given at this time due to BP and throat spray. This morning tylenol worked and patient was satisfied. Gave tylenol again this afternoon, patient requested something stronger, but agreed tylenol would be best to try first. This afternoon emptied catheter and HEATHER drain, see flowsheet. Paged surgery about patient status during morning rounds and this afternoon findings. @1400 rounds, patient more awake and this RN helped patient call her son and her sibling from emergency contact list to update them herself. NGT to CLWS. Takes pills whole with water, refusing some due to the size and ngt. R HEATHER drain with bloody drainage and dressing is C/D/I. Dooley cath, patent, new securement device placed on patient, don care provided. CHG bath completed by PCA Tamika Lora. Midline incisions C/D/I with new dressing from surgery rounds this morning. X2 assist to get patients standing scale weight, see flowsheet. SCDs on. Q2turn with pillow support. Bed locked and in lowest position. Bedside table and call light within reach. Denies further needs at this time.

## 2023-03-08 NOTE — PLAN OF CARE
Problem: Discharge Planning  Goal: Discharge to home or other facility with appropriate resources  3/8/2023 0211 by Mechelle Tobar RN  Outcome: Progressing     Problem: Pain  Goal: Verbalizes/displays adequate comfort level or baseline comfort level  3/8/2023 0211 by Mechelle Tobar RN  Outcome: Progressing     Problem: Chronic Conditions and Co-morbidities  Goal: Patient's chronic conditions and co-morbidity symptoms are monitored and maintained or improved  3/8/2023 0211 by Mechelle Tobar RN  Outcome: Progressing

## 2023-03-08 NOTE — PROGRESS NOTES
After obtaining consent from patient discussed indications for operation, operative findings and need for open procedure, as well as plan moving forwards with Agustina Ellis MD

## 2023-03-08 NOTE — PROGRESS NOTES
Comprehensive Nutrition Assessment    Type and Reason for Visit:  Reassess    Nutrition Recommendations/Plan:   Diet advancement vs need for alternative methods of nutrition per general surgery  Obtain updated wt  Monitor nutrition adequacy, pertinent labs, bowel habits, wt changes, and clinical progress     Malnutrition Assessment:  Malnutrition Status:  At risk for malnutrition (Comment) (03/02/23 1441)    Context:  Acute Illness     Findings of the 6 clinical characteristics of malnutrition:  Energy Intake:  Mild decrease in energy intake (Comment)  Muscle Mass Loss:  Moderate muscle mass loss Clavicles (pectoralis & deltoids), Temples (temporalis)      Nutrition Assessment:    Follow up: New colon cancer found form biopsies from scope on 3/1. S/p right colectomy on 3/7. NGT to suction in place. Pt currently NPO, previously on regular 5 carb choice low fiber diet, PO intakes % per EMR. Pt reports no appetite since surgery, but having a good appetite before surgery. Updated wt ordered. Up +6.2L since admission. No c/o nausea. Pt did not have any nutrition related questions at this time. Will continue to monitor.    Nutrition Related Findings:    Na 135, K+ 5.8, Cr 1.7. -372 x 24 hr. A1C 8.1 2/28. NGT for suction 600 ml output on 3/8. Wound Type: Surgical Incision       Current Nutrition Intake & Therapies:    Average Meal Intake: NPO  Average Supplements Intake: NPO  Diet NPO Exceptions are: Sips of Water with Meds    Anthropometric Measures:  Height: 5' 3\" (160 cm)  Ideal Body Weight (IBW): 115 lbs (52 kg)       Current Body Weight: 121 lb (54.9 kg), 105.2 % IBW. Weight Source: Standing Scale  Current BMI (kg/m2): 21.4        Weight Adjustment For: No Adjustment                 BMI Categories: Normal Weight (BMI 18.5-24.9)    Estimated Daily Nutrient Needs:  Energy Requirements Based On: Kcal/kg (25-30 kcals/kg)  Weight Used for Energy Requirements: Current (55 kg)  Energy (kcal/day):  9080-9754  Weight Used for Protein Requirements: Current (1-1.2 g/kg)  Protein (g/day): 55-66 g  Method Used for Fluid Requirements: 1 ml/kcal  Fluid (ml/day):      Nutrition Diagnosis:   Inadequate oral intake related to inadequate protein-energy intake as evidenced by poor intake prior to admission, moderate muscle loss, NPO or clear liquid status due to medical condition    Nutrition Interventions:   Food and/or Nutrient Delivery:  (Initiate most appropriate form of nutrition per General Surgery)  Nutrition Education/Counseling: No recommendation at this time  Coordination of Nutrition Care: Continue to monitor while inpatient       Goals:  Previous Goal Met: No Progress toward Goal(s)  Goals: other (specify)  Specify Other Goals: Initiate most appropriate form of nutrition per General Surgery    Nutrition Monitoring and Evaluation:   Behavioral-Environmental Outcomes: None Identified  Food/Nutrient Intake Outcomes: Diet Advancement/Tolerance  Physical Signs/Symptoms Outcomes: Weight, Nutrition Focused Physical Findings, Biochemical Data, GI Status    Discharge Planning:     Too soon to determine     Larrie Ego: Office: [phone]; [place]: [phone]

## 2023-03-08 NOTE — PROGRESS NOTES
PS sent to Dr. Jesus Horan:     When the patient arrived her temperature was 94.4. I applied 3 warm blankets and turned the heat up in the room at that time. Checking an hour later rectally her temperature is 94.0. Can I have an order for a damari hugger? This woman was never given any pain medication in PACU because the concerns with her BP. I do want to let you know her that her BPs has ranged from 86/55 to 102/61 since being on the unit and fluctuating between. I know they did give her two 500ml boluses while in PACU as an FYI. I don't mind to give her pain medications however I am closely monitoring her BP prior to. Also, just an update. Up to this time I have received 300ml of blood from her HEATHER drain.

## 2023-03-08 NOTE — PROGRESS NOTES
MD made aware of dropping BP. Order for 500mL fluid bolus. Order noted for PCA pump to be placed. Unable to place PCA pump at this time due to patient BP and pressure unable to support.

## 2023-03-08 NOTE — PLAN OF CARE
Discussed pain management and BP. Pain medication given per MD orders.      Problem: Pain  Goal: Verbalizes/displays adequate comfort level or baseline comfort level  Outcome: Progressing

## 2023-03-08 NOTE — PROGRESS NOTES
[facility] GENERAL SURGERY    Surgery Progress Note           POD # 1    PATIENT NAME: Gabriela Fox     TODAY'S DATE: 3/8/2023    INTERVAL HISTORY:    Pt sleepy, but awakens - having some incisional pain. OBJECTIVE:   VITALS:  BP (!) 92/57   Pulse (!) 104   Temp 98.1 °F (36.7 °C) (Oral)   Resp 16   Ht 5' 3\" (1.6 m)   Wt 121 lb 8 oz (55.1 kg)   SpO2 97%   BMI 21.52 kg/m²     INTAKE/OUTPUT:    I/O last 3 completed shifts: In: 3463 [P.O.:120; I.V.:1400; IV Piggyback:200]  Out: 775 [Urine:325; Drains:450]  I/O this shift:  In: -   Out: 600 [Emesis/NG output:600]              CONSTITUTIONAL:  fatigued   LUNGS:  no crackles or wheezing  ABDOMEN:   hypoactive bowel sounds, soft, non-distended, tenderness noted midline incision, bryanna drain sanguinous   INCISION: scant amt drainage on dressing. Data:  CBC:   Recent Labs     03/07/23  0824   WBC 3.7*   HGB 8.9*   HCT 27.8*        BMP:    Recent Labs     03/07/23  0629 03/08/23  0533    135*   K 5.0 5.8*    98*   CO2 29 18*   BUN 12 19   CREATININE 1.1 1.7*   GLUCOSE 335* 415*     Hepatic: No results for input(s): AST, ALT, ALB, BILITOT, ALKPHOS in the last 72 hours. Mag:    No results for input(s): MG in the last 72 hours. Phos:   No results for input(s): PHOS in the last 72 hours. INR:   Recent Labs     03/07/23  0824   INR 0.93         ASSESSMENT AND PLAN:  60 y.o. female status post Robotic converted to open right colectomy  Unfortunately the pt's orders were not released by nursing last night, therefore the patient's post op orders were not initiated, she had no IVF, PCA was not started, antibiotics were not given.      GI: continue with ngt to suction today  : continue with petersen to monitor I/O  SHARRON: will give 500ml fluid bolus this AM, discuss with primary team.   CVD: mildly hypotensive, will see how responds with fluid bolus  Pain: PCA was not started, and with mild hypotension, will just give PRN dilaudid for now  Activity: OOB to chair  Path: pending      Electronically signed by SARTHAK Cain CNP     Patient seen and agree with above and more than half of the total time was spent by me on the encounter. Reportedly didn't receive new postop orders including fluids and abx. Continue resuscitation today. NG to stay in   Discussed operative findings and what was done with Benita Cat postop last night and will contact family again today.   Honey Bower MD

## 2023-03-08 NOTE — PROGRESS NOTES
4 Eyes Skin Assessment     The patient is being assess for  Post-Op Surgical    I agree that 2 RN's have performed a thorough Head to Toe Skin Assessment on the patient. ALL assessment sites listed below have been assessed. Areas assessed by both nurses: Yaniv Dewitt RN  [x]   Head, Face, and Ears   [x]   Shoulders, Back, and Chest  [x]   Arms, Elbows, and Hands   [x]   Coccyx, Sacrum, and Ischum  [x]   Legs, Feet, and Heels        Does the Patient have Skin Breakdown?   No         Maco Prevention initiated:  Yes   Wound Care Orders initiated:  NA      [facility] nurse consulted for Pressure Injury (Stage 3,4, Unstageable, DTI, NWPT, and Complex wounds):  NA      Nurse 1 eSignature: Electronically signed by Chaim Suazo RN on 3/8/23 at 11:28 AM EST    **SHARE this note so that the co-signing nurse is able to place an eSignature**    Nurse 2 eSignature: Electronically signed by Elham Fernandez RN on 3/8/23 at 3:32 PM EST

## 2023-03-08 NOTE — PROGRESS NOTES
Dr. Crystal Jay completed sx on this patient that started off as robotic and was changed to midline incision. Visitors in room expressing she would like to see the doctor to have a follow up discussion as to what happened in surgery and why the change. I am not sure if Dr. Vivi Davenport is still present however perfect serve sent me to you.

## 2023-03-08 NOTE — PROGRESS NOTES
PROGRESS NOTE    HPI: Mable Jerry is a(n)62 y.o. female admitted for work-up and treatment for Confusion [R41.0]  Hypoglycemia [E16.2]  Hypoglycemia due to insulin [E16.0, T38.3X5A]. We are following for colon adenocarcinoma. Subjective:     S/p colon resection yesterday. Reports surgical tenderness pain this morning. Tired. Hypotensive this morning. Objective:     I/O last 3 completed shifts: In: 4646 [P.O.:120; I.V.:1400; IV Piggyback:200]  Out: 775 [Urine:325; Drains:450]      BP (!) 91/52   Pulse (!) 106   Temp 98 °F (36.7 °C) (Oral)   Resp 16   Ht 5' 3\" (1.6 m)   Wt 121 lb 8 oz (55.1 kg)   SpO2 96%   BMI 21.52 kg/m²     Physical Exam:  HEENT: anicteric sclera, oropharyngeal membranes pink and moist. + NGT to suction  Cor: RRR  Lungs: non-labored, no respiratory distress  Abdomen: non distended, + midline surgical incision, incisional tenderness, Rt HEATHER drain with sanguinous output  Extremities: no edema  Neuro: alert and oriented x 3      Results:   Lab Results   Component Value Date    ALT 40 02/26/2023    AST 39 (H) 02/26/2023     (H) 06/03/2021    ALKPHOS 162 (H) 02/26/2023    BILIDIR <0.2 02/10/2023    PROT 7.1 02/26/2023    LABALBU 2.9 (L) 03/05/2023    INR 0.93 03/07/2023    LIPASE 5.0 (L) 02/10/2023     Lab Results   Component Value Date    WBC 3.7 (L) 03/07/2023    HGB 8.9 (L) 03/07/2023    HCT 27.8 (L) 03/07/2023    MCV 88.5 03/07/2023     03/07/2023     BUN/Cr/glu/ALT/AST/amyl/lip:  19/1.7/--/--/--/--/-- (03/08 0533)  CT ABDOMEN PELVIS WO CONTRAST Additional Contrast? None    Result Date: 3/2/2023  1. No acute abdominal or pelvic findings. 2.  Large amount of formed stool throughout the colon. Correlate with any evidence of constipation. XR ABDOMEN (KUB) (SINGLE AP VIEW)    Result Date: 3/2/2023  1. Moderate stool in the colon, correlate with signs of constipation.      XR CHEST PORTABLE    Result Date: 3/6/2023  No evidence of edema or pneumonia. Mild cardiac silhouette enlargement.         Impression:  Invasive adenocarcinoma of the colon.  S/p open right colectomy.    Plan:  Awaiting surgical path. Oncology consulted and following. Post op care per surgery.  Will follow peripherally.     Please do not hesitate to call with questions or concerns.      Electronically signed by: SARTHAK Bruce 3/8/2023 11:52 AM

## 2023-03-08 NOTE — CARE COORDINATION
Hospital day 10, POD1: Patient transferred from B3 to C3 post op. Patient care managed by IM, General Surgery, and GI. Patient from home alone 3rd floor apartment, current rec home with 24 hr and HHC. Patient declined by [facility], [place] [facility], [facility], [facility], [facility], and [facility]. Writer sent referral to [facility]- pending. Patient with NGT to suction, IV ATB, IV fluids. Monitor BP soft. Following. IBETH Briggs  0958: [facility] declined referral, pending now with [facility]. IBETH rBiggs  4224: [facility] declined re INS provider. IBETH Briggs  18: Spoke with son via phone update on issues with [address] re payer barrier, open to SNF if recs and patient agreeable. IBETH Briggs

## 2023-03-08 NOTE — PROGRESS NOTES
Hospitalist Progress Note      PCP: Karrie Ann    Date of Admission: 2/26/2023    Chief Complaint:   Hypoglycemia     Subjective:    Drowsy. Pain controlled. Medications:  Reviewed    Infusion Medications    sodium chloride 100 mL/hr at 03/08/23 0740    dextrose      sodium chloride       Scheduled Medications    metroNIDAZOLE  500 mg IntraVENous Q8H    sodium chloride  500 mL IntraVENous Once    [START ON 3/9/2023] enoxaparin  30 mg SubCUTAneous Daily    ceFAZolin  2,000 mg IntraVENous Q12H    lactulose  30 g Oral BID    amLODIPine  5 mg Oral Daily    insulin NPH  14 Units SubCUTAneous QAM    insulin lispro  0-8 Units SubCUTAneous TID WC    insulin lispro  0-4 Units SubCUTAneous Nightly    insulin NPH  6 Units SubCUTAneous Nightly    insulin regular  10 Units SubCUTAneous Once    aspirin  81 mg Oral Daily    atorvastatin  20 mg Oral Daily    calcium carb-cholecalciferol  1 tablet Oral Daily    gabapentin  600 mg Oral TID    lisinopril  40 mg Oral Daily    paliperidone  9 mg Oral QAM    pantoprazole  40 mg Oral Daily    traZODone  200 mg Oral Nightly    sodium chloride flush  5-40 mL IntraVENous 2 times per day     PRN Meds: phenol, hydrALAZINE, glucose, dextrose bolus **OR** dextrose bolus, glucagon (rDNA), dextrose, clonazePAM, sodium chloride flush, sodium chloride, ondansetron **OR** ondansetron, polyethylene glycol, acetaminophen **OR** acetaminophen      Intake/Output Summary (Last 24 hours) at 3/8/2023 1030  Last data filed at 3/8/2023 0802  Gross per 24 hour   Intake 1600 ml   Output 1375 ml   Net 225 ml         Physical Exam Performed:    BP (!) 92/57   Pulse (!) 104   Temp 98.1 °F (36.7 °C) (Oral)   Resp 16   Ht 5' 3\" (1.6 m)   Wt 121 lb 8 oz (55.1 kg)   SpO2 97%   BMI 21.52 kg/m²     General appearance: No apparent distress, appears stated age and cooperative. HEENT: Pupils equal, round, and reactive to light. Conjunctivae/corneas clear. Neck: Supple, with full range of motion.  No jugular venous distention. Trachea midline. Respiratory:  Normal respiratory effort. Clear to auscultation, bilaterally without Rales/Wheezes/Rhonchi. Cardiovascular: Regular rate and rhythm with normal S1/S2 without murmurs, rubs or gallops. Abdomen: Soft nontender. Positive bowel sounds. Musculoskeletal: No clubbing, cyanosis or edema bilaterally. Full range of motion without deformity. Skin: Skin color, texture, turgor normal.  No rashes or lesions. Neurologic:  Neurovascularly intact without any focal sensory/motor deficits. Cranial nerves: II-XII intact, grossly non-focal.  Psychiatric: Drowsy but oriented x3. Capillary Refill: Brisk, 3 seconds, normal   Peripheral Pulses: +2 palpable, equal bilaterally       Labs:   Recent Labs     03/07/23  0824   WBC 3.7*   HGB 8.9*   HCT 27.8*          Recent Labs     03/07/23  0629 03/08/23  0533    135*   K 5.0 5.8*    98*   CO2 29 18*   BUN 12 19   CREATININE 1.1 1.7*   CALCIUM 8.8 8.8       No results for input(s): AST, ALT, BILIDIR, BILITOT, ALKPHOS in the last 72 hours. Recent Labs     03/07/23  0824   INR 0.93       No results for input(s): Wisam Beets in the last 72 hours. Urinalysis:      Lab Results   Component Value Date/Time    NITRU Negative 12/10/2022 01:42 PM    WBCUA 3-5 12/10/2022 01:42 PM    BACTERIA 2+ 12/10/2022 01:42 PM    RBCUA 5-10 12/10/2022 01:42 PM    BLOODU MODERATE 12/10/2022 01:42 PM    SPECGRAV >=1.030 12/10/2022 01:42 PM    GLUCOSEU >=1000 12/10/2022 01:42 PM    GLUCOSEU >=1000 mg/dL 06/07/2010 03:38 PM       Radiology:  XR CHEST PORTABLE   Final Result   No evidence of edema or pneumonia. Mild cardiac silhouette enlargement. CT ABDOMEN PELVIS WO CONTRAST Additional Contrast? None   Final Result   1. No acute abdominal or pelvic findings. 2.  Large amount of formed stool throughout the colon. Correlate with any   evidence of constipation.          XR ABDOMEN (KUB) (SINGLE AP VIEW)   Final Result   1. Moderate stool in the colon, correlate with signs of constipation.         US RETROPERITONEAL COMPLETE   Final Result   Left kidney is unremarkable         CT HEAD WO CONTRAST   Final Result   No acute intracranial abnormality.      Mild cerebral white matter chronic microvascular ischemic disease.      Chronic right maxillary sinus opacification in setting of chronic sinusitis.             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  Patient was apparently found unresponsive on the toilet while doing her bowel prep for a colonoscopy procedure scheduled tomorrow with [facility].  She also supposed to get an EGD.  Patient reports she remembers getting diaphoretic and weak just prior to using the bathroom.  And after she sat on the toilet she became very lightheaded and lost her consciousness.  She reports she takes 22 units of Lantus in the morning which she did.  Was instructed not to eat anything so she drank some water and Gatorade in the morning.  She also proceeded to take 8 units of the short acting in the morning.  Patient reports she is not aware that she is not supposed to take the short acting insulin with a meal if she was not going to eat anything for the meal.  She has been on insulin and a diabetic for about 10  years.  EMS was called, and they reported her blood sugar was 40 at the scene. \"    Colon Cancer: New diagnosis. Weight loss, abdominal pain, constipation and elevated CEA. Colonoscopy 2/27, pathology report now finalized and showed invasive Adenocarcinoma. S/p Colectomy 3/7. Continue NGT, post-op care. Hypoglycemia due to insulin use with decreased p.o. intake. Blood sugars are labile. Patient switched to NPH. Sliding scale increased. Will monitor closely while NPO. SHARRON with acidosis. 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 Patient was on a bicarb drip. Improved. Continue to monitor renal function. Hypertension. Blood pressure labile. PO meds on hold while NPO. Monitor.      DVT Prophylaxis: Lovenox  Diet: Diet NPO Exceptions are: Sips of Water with Meds  Code Status: Full Code  PT/OT Eval Status: patient ambulatory at home    Dispo - ?2-3 days    Render Boxer, MD

## 2023-03-08 NOTE — PROGRESS NOTES
4 Eyes Skin Assessment     The patient is being assess for   Transfer to New Unit    I agree that 2 RN's have performed a thorough Head to Toe Skin Assessment on the patient. ALL assessment sites listed below have been assessed. Areas assessed for pressure by both nurses:   [x]   Head, Face, and Ears   [x]   Shoulders, Back, and Chest, Abdomen  [x]   Arms, Elbows, and Hands   [x]   Coccyx, Sacrum, and Ischium  [x]   Legs, Feet, and Heels        Skin Assessed Under all Medical Devices by both nurses:  NG and SCD's              All Mepilex Borders were peeled back and area peeked at by both nurses:  Yes  Please list where Mepilex Borders are located:  coccyx             **SHARE this note so that the co-signing nurse is able to place an eSignature**    Co-signer eSignature: Electronically signed by Josemanuel Cabral RN on 3/7/23 at 8:24 PM EST    Does the Patient have Skin Breakdown related to pressure?   No     3 trochanter surgical incision sites to left side of abdomen  Midline surgical incision noted to abdomen           Maco Prevention initiated:  NA   Wound Care Orders initiated:  NA      WOC nurse consulted for Pressure Injury (Stage 3,4, Unstageable, DTI, NWPT, Complex wounds)and New or Established Ostomies:  NA      Primary Nurse eSignature: Electronically signed by Laurel Gipson RN on 3/8/23 at 2:07 AM EST

## 2023-03-08 NOTE — PROGRESS NOTES
Message sent to the Pharmacy    Can you please send this insulin and the lactulose to the unit please? Thank you.

## 2023-03-08 NOTE — PROGRESS NOTES
Message sent to Pharmacy     I called [facility] because I do not see it on this unit. They are looking on there unit.

## 2023-03-08 NOTE — PROGRESS NOTES
Patient arrived to the unit at approx 2015. On arrival female visitor was already in the room expressing that she was the patients POA. She voiced that nobody had called and given them updates opposed to the son near [phone]. It was expressed family/ visitor who voices that she is the POA saying that nobody updated them on her no having laparoscopic surgery opposed to having a midline incision. I did notify the MD of the concerns and looked up the patients POA paper. There are 3 people listed. The primary and 2 secondaries. The woman In the room was taking pictures of the patients abdomen. She apparently sent these pictures to the patients family and was facetiming. Later on in the evening the patients son's gf called and expressed that her son was to upset to talk. I expressed that due to HIPAA I could not provide information to the girlfriend only the son. The son picked up the call and an update was given. I did message Dr. Jazmin Weiner, please see previous notes. Dr. Jazmin Weiner did call back later on. Vitals are now stable, temperature normothermic, more blood from HEATHER drain. Please see I/O. Pain medication was given per MD orders. Insulin being given. There was a request placed to pharmacy for additional medications. See notes and requests.

## 2023-03-09 PROBLEM — E16.2 HYPOGLYCEMIA: Status: ACTIVE | Noted: 2023-02-26

## 2023-03-09 LAB
ANION GAP SERPL CALCULATED.3IONS-SCNC: 9 MMOL/L (ref 3–16)
BUN BLDV-MCNC: 33 MG/DL (ref 7–20)
CALCIUM SERPL-MCNC: 8.2 MG/DL (ref 8.3–10.6)
CHLORIDE BLD-SCNC: 105 MMOL/L (ref 99–110)
CO2: 25 MMOL/L (ref 21–32)
CREAT SERPL-MCNC: 2 MG/DL (ref 0.6–1.2)
GFR SERPL CREATININE-BSD FRML MDRD: 27 ML/MIN/{1.73_M2}
GLUCOSE BLD-MCNC: 129 MG/DL (ref 70–99)
GLUCOSE BLD-MCNC: 168 MG/DL (ref 70–99)
GLUCOSE BLD-MCNC: 174 MG/DL (ref 70–99)
GLUCOSE BLD-MCNC: 82 MG/DL (ref 70–99)
GLUCOSE BLD-MCNC: 88 MG/DL (ref 70–99)
HCT VFR BLD CALC: 25.4 % (ref 36–48)
HEMOGLOBIN: 8.1 G/DL (ref 12–16)
MAGNESIUM: 1.6 MG/DL (ref 1.8–2.4)
MCH RBC QN AUTO: 28.6 PG (ref 26–34)
MCHC RBC AUTO-ENTMCNC: 31.9 G/DL (ref 31–36)
MCV RBC AUTO: 89.4 FL (ref 80–100)
OSMOLALITY URINE: 443 MOSM/KG (ref 390–1070)
PDW BLD-RTO: 14.8 % (ref 12.4–15.4)
PERFORMED ON: ABNORMAL
PERFORMED ON: ABNORMAL
PERFORMED ON: NORMAL
PERFORMED ON: NORMAL
PHOSPHORUS: 3.9 MG/DL (ref 2.5–4.9)
PLATELET # BLD: 142 K/UL (ref 135–450)
PMV BLD AUTO: 9.4 FL (ref 5–10.5)
POTASSIUM SERPL-SCNC: 5.2 MMOL/L (ref 3.5–5.1)
RBC # BLD: 2.84 M/UL (ref 4–5.2)
SODIUM BLD-SCNC: 139 MMOL/L (ref 136–145)
SODIUM URINE: 59 MMOL/L
WBC # BLD: 10.5 K/UL (ref 4–11)

## 2023-03-09 PROCEDURE — 6360000002 HC RX W HCPCS: Performed by: INTERNAL MEDICINE

## 2023-03-09 PROCEDURE — 6370000000 HC RX 637 (ALT 250 FOR IP): Performed by: SURGERY

## 2023-03-09 PROCEDURE — 36415 COLL VENOUS BLD VENIPUNCTURE: CPT

## 2023-03-09 PROCEDURE — 83735 ASSAY OF MAGNESIUM: CPT

## 2023-03-09 PROCEDURE — 83935 ASSAY OF URINE OSMOLALITY: CPT

## 2023-03-09 PROCEDURE — 2500000003 HC RX 250 WO HCPCS: Performed by: SURGERY

## 2023-03-09 PROCEDURE — 6360000002 HC RX W HCPCS: Performed by: SURGERY

## 2023-03-09 PROCEDURE — 1200000000 HC SEMI PRIVATE

## 2023-03-09 PROCEDURE — 84300 ASSAY OF URINE SODIUM: CPT

## 2023-03-09 PROCEDURE — APPSS30 APP SPLIT SHARED TIME 16-30 MINUTES: Performed by: CLINICAL NURSE SPECIALIST

## 2023-03-09 PROCEDURE — 2580000003 HC RX 258: Performed by: SURGERY

## 2023-03-09 PROCEDURE — 6370000000 HC RX 637 (ALT 250 FOR IP): Performed by: NURSE PRACTITIONER

## 2023-03-09 PROCEDURE — 85027 COMPLETE CBC AUTOMATED: CPT

## 2023-03-09 PROCEDURE — 80048 BASIC METABOLIC PNL TOTAL CA: CPT

## 2023-03-09 PROCEDURE — 84100 ASSAY OF PHOSPHORUS: CPT

## 2023-03-09 RX ORDER — MAGNESIUM SULFATE IN WATER 40 MG/ML
4000 INJECTION, SOLUTION INTRAVENOUS ONCE
Status: COMPLETED | OUTPATIENT
Start: 2023-03-09 | End: 2023-03-09

## 2023-03-09 RX ORDER — TRAMADOL HYDROCHLORIDE 50 MG/1
50 TABLET ORAL EVERY 6 HOURS PRN
Status: DISCONTINUED | OUTPATIENT
Start: 2023-03-09 | End: 2023-03-15 | Stop reason: HOSPADM

## 2023-03-09 RX ADMIN — ATORVASTATIN CALCIUM 20 MG: 10 TABLET, FILM COATED ORAL at 08:59

## 2023-03-09 RX ADMIN — ACETAMINOPHEN 325MG 650 MG: 325 TABLET ORAL at 02:35

## 2023-03-09 RX ADMIN — ASPIRIN 81 MG: 81 TABLET, COATED ORAL at 08:59

## 2023-03-09 RX ADMIN — TRAMADOL HYDROCHLORIDE 50 MG: 50 TABLET, COATED ORAL at 20:48

## 2023-03-09 RX ADMIN — Medication 2000 MG: at 23:06

## 2023-03-09 RX ADMIN — Medication 2000 MG: at 09:07

## 2023-03-09 RX ADMIN — MAGNESIUM SULFATE HEPTAHYDRATE 4000 MG: 40 INJECTION, SOLUTION INTRAVENOUS at 12:03

## 2023-03-09 RX ADMIN — CLONAZEPAM 0.5 MG: 0.5 TABLET ORAL at 02:35

## 2023-03-09 RX ADMIN — PANTOPRAZOLE SODIUM 40 MG: 40 TABLET, DELAYED RELEASE ORAL at 08:59

## 2023-03-09 RX ADMIN — LACTULOSE 30 G: 10 SOLUTION ORAL at 09:09

## 2023-03-09 RX ADMIN — LACTULOSE 30 G: 10 SOLUTION ORAL at 23:04

## 2023-03-09 RX ADMIN — SODIUM CHLORIDE: 9 INJECTION, SOLUTION INTRAVENOUS at 20:43

## 2023-03-09 RX ADMIN — SODIUM CHLORIDE, PRESERVATIVE FREE 10 ML: 5 INJECTION INTRAVENOUS at 09:00

## 2023-03-09 RX ADMIN — ENOXAPARIN SODIUM 30 MG: 100 INJECTION SUBCUTANEOUS at 08:59

## 2023-03-09 RX ADMIN — GABAPENTIN 600 MG: 300 CAPSULE ORAL at 13:28

## 2023-03-09 RX ADMIN — ACETAMINOPHEN 325MG 650 MG: 325 TABLET ORAL at 18:44

## 2023-03-09 RX ADMIN — TRAZODONE HYDROCHLORIDE 200 MG: 50 TABLET ORAL at 20:48

## 2023-03-09 RX ADMIN — SODIUM CHLORIDE, PRESERVATIVE FREE 10 ML: 5 INJECTION INTRAVENOUS at 20:52

## 2023-03-09 RX ADMIN — AMLODIPINE BESYLATE 5 MG: 5 TABLET ORAL at 08:59

## 2023-03-09 RX ADMIN — ACETAMINOPHEN 325MG 650 MG: 325 TABLET ORAL at 09:19

## 2023-03-09 RX ADMIN — GABAPENTIN 600 MG: 300 CAPSULE ORAL at 08:59

## 2023-03-09 RX ADMIN — GABAPENTIN 600 MG: 300 CAPSULE ORAL at 20:48

## 2023-03-09 RX ADMIN — METRONIDAZOLE 500 MG: 500 INJECTION, SOLUTION INTRAVENOUS at 18:44

## 2023-03-09 RX ADMIN — INSULIN HUMAN 14 UNITS: 100 INJECTION, SUSPENSION SUBCUTANEOUS at 09:12

## 2023-03-09 RX ADMIN — Medication 1 TABLET: at 08:59

## 2023-03-09 RX ADMIN — METRONIDAZOLE 500 MG: 500 INJECTION, SOLUTION INTRAVENOUS at 02:35

## 2023-03-09 RX ADMIN — METRONIDAZOLE 500 MG: 500 INJECTION, SOLUTION INTRAVENOUS at 10:03

## 2023-03-09 RX ADMIN — LISINOPRIL 40 MG: 20 TABLET ORAL at 08:59

## 2023-03-09 ASSESSMENT — PAIN SCALES - GENERAL
PAINLEVEL_OUTOF10: 10
PAINLEVEL_OUTOF10: 10
PAINLEVEL_OUTOF10: 0
PAINLEVEL_OUTOF10: 0
PAINLEVEL_OUTOF10: 10
PAINLEVEL_OUTOF10: 5
PAINLEVEL_OUTOF10: 10
PAINLEVEL_OUTOF10: 6

## 2023-03-09 ASSESSMENT — PAIN DESCRIPTION - LOCATION
LOCATION: ABDOMEN
LOCATION: ABDOMEN

## 2023-03-09 ASSESSMENT — PAIN DESCRIPTION - DESCRIPTORS
DESCRIPTORS: OTHER (COMMENT)
DESCRIPTORS: ACHING

## 2023-03-09 ASSESSMENT — PAIN DESCRIPTION - ORIENTATION
ORIENTATION: MID
ORIENTATION: MID

## 2023-03-09 NOTE — PROGRESS NOTES
Patient had been resting in bed. Voices now pain a 10/10. Comfort measures given. Pillow provided to lay across abdomen for comfort. Green fluid from NG. Urine output consistent up to this time. HEATHER drain slightly starting to changer colors to more lighter red. Otherwise, no additional needs noted at this time. Will continue to monitor. Call light in reach.

## 2023-03-09 NOTE — CONSULTS
Interval History and plan:      She is +6.9 L  No significant pedal edema  However  Made only 475 ml urine  yesterday  She has had 1.3 L of gastric output yesterday on NG tube  Plan:  SHARRON appears to be due to low blood pressure, fluid shift  High output from NG tube  Agree with IV fluids  Check urine for sodium  Her blood pressure is low  We will try to bring it up to close to 140 to help renal recovery  Stop amlodipine  Stop lisinopril  Avoid iv antihypertensives                     Assessment :     Acute Kidney Injury  Creatinine peaked to 2.3-2 on consult  Creatinine dfnxotjq-0-1.3  She is known to have CKD 2 baseline-likely due to nephrectomy, recurrent SHARRON, diabetes  She has a history of Wilms tumor and nephrectomy  Had SHARRON on 2/21-seen by me again. Her protein creatinine ratio was normal  She also has diabetes mellitus on insulin-which is a risk factor for CKD         Hypertension   BP: (109-115)/(55-65)  Heart Rate:  [102]   BP goal inpatient [id number] systolic inpatient  Blood pressure is relatively soft  She is also known to have atrial fibrillation        [facility] would like to thank Jenifer Guzmán MD   for opportunity to serve this patient      Please call with questions at-   24 Hrs Answering service [phone] or  7 am- 5 pm via Perfect serve or cell phone  Aaron Eduardo MD          CC/reason for consult :     SHARRON     HPI :     Geoff Vines is a 60 y.o. female presented to   the hospital on 2/26/2023 with altered mental status. She was unresponsive on presentation. She was getting bowel preparation for colonoscopy and fell in the bathroom preceded by dizziness weakness and swelling. She also lost her consciousness. She was taking short acting insulin in addition to Lantus despite of being NPO. She is known to have diabetes on insulin. EMS was called. She was found to have blood sugar of 40 which was treated in route and she was brought to the emergency room.   She had a colonoscopy done inpatient and had a biopsy done which showed lesion in the hepatic flexure came back positive for colon cancer. She had a colon surgery done this hospitalization  Her course of hospitalization is complicated by SHARRON. ROS:     Seen with-no family SHARRON    positives in bold   Constitutional:  fever, chills, weakness, weight change, fatigue  Skin:  rash, pruritus, hair loss, bruising, dry skin, petechiae  Head, Face, Neck   headaches, swelling,  cervical adenopathy  Respiratory: shortness of breath, cough, or wheezing  Cardiovascular: chest pain, palpitations, dizzy, edema  Gastrointestinal: nausea, vomiting, diarrhea, constipation,belly pain    Yellow skin, blood in stool  Musculoskeletal:  back pain, muscle weakness, gait problems,       joint pain or swelling. Genitourinary:  dysuria, poor urine flow, flank pain, blood in urine  Neurologic:  vertigo, TIA'S, syncope, seizures, focal weakness  Psychosocial:  insomnia, anxiety, or depression. Additional positive findings:                    All other remaining systems are negative or unable to obtain        PMH/PSH/SH/Family History:     Past Medical History:   Diagnosis Date    Abnormal brain MRI 7/20/2017    Partially empty sella and minimal chronic small vessel ischemic disease    Acute bilateral low back pain without sciatica 11/2/2016    SHARRON (acute kidney injury) ([facility] [address] 7/5/2017    Arthritis     back    Bipolar disorder ([address] 10/18/2008    CAD (coronary artery disease)     stent placed 6/8/20    Cancer ([address] 2015    bilateral breast:s/p lumpectomy/radiation:under care care of breast specialist:Dr. Boone     Carotid stenosis, bilateral:<50%:per US 7/2016 7/15/2016    Carpal tunnel syndrome 10/18/2008    Cervical cancer screening 2014    Nml per pt'.     Coronary artery disease of native artery of native heart with stable angina pectoris ([address] 6/9/2020    DDD (degenerative disc disease), lumbar 7/18/2018    Depression     under care of pschiatrist:Dr. 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)     Non morbid obesity 7/1/2016    Pancreatitis 5/12/16    [facility] hospitalization 5/12/16-5/16/16:under care of GI:chronic pancreatitis    S/P endoscopy 6/14/2016    [facility]:per pt' & her family member was nml.     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 myelopathy or radiculopathy 3/10/2017    Transient cerebral ischemia 07/15/2016    TIA:7/10/16    Unspecified cerebral artery occlusion with cerebral infarction     TIA       Past Surgical History:   Procedure Laterality Date    BREAST LUMPECTOMY  2015    Bilateral:breast cancer    CARDIAC CATHETERIZATION  06/08/2020    Dr. Lopez [facility]), DAYANA to Diag 1    COLONOSCOPY N/A 2/1/2021    COLONOSCOPY DIAGNOSTIC performed by Monster Ferraro MD at [facility] [place], operated by [facility] N/A 2/27/2023    COLONOSCOPY WITH BIOPSY performed by Monster Ferraro MD at [place] [address]  2/3/2021    CT BONE MARROW BIOPSY 2/3/2021 Prema Larios MD [facility] CT SCAN    HEMICOLECTOMY N/A 3/7/2023    ROBOTIC CONVERTED TO OPEN RIGHT COLECTOMY performed by Harriett Hicks MD at [address] [facility] ([address] [place] [address])      Benign:no cervical cancer per pt'    KIDNEY REMOVAL      right    OTHER SURGICAL HISTORY Right     orif right ankle    TEMPORAL ARTERY BIOPSY Right 8/9/2021    RIGHT TEMPORAL ARTERY BIOPSY LIGATION performed by Tahira Norris MD at [address]  1/29/2021    EGD BIOPSY performed by Monster Ferraro MD at [facility] ENDOSCOPY N/A 12/15/2022    EGD BIOPSY performed by Adalberto Mims MD at [address]        reports that she quit smoking about 8 years ago. Her smoking use included cigarettes and cigars. She has a 10.00 pack-year smoking history. She has never used smokeless tobacco. She reports that she does not drink alcohol and does not use drugs. family history includes Cancer in her father and mother.          Medication:     Current Facility-Administered Medications: magnesium sulfate 4000 mg in 100 mL IVPB premix, 4,000 mg, IntraVENous, Once  metronidazole (FLAGYL) 500 mg in 0.9% NaCl 100 mL IVPB premix, 500 mg, IntraVENous, Q8H  0.9 % sodium chloride infusion, , IntraVENous, Continuous  phenol 1.4 % mouth spray 1 spray, 1 spray, Mouth/Throat, Q2H PRN  enoxaparin Sodium (LOVENOX) injection 30 mg, 30 mg, SubCUTAneous, Daily  ceFAZolin (ANCEF) 2000 mg in 0.9% sodium chloride 100 mL IVPB, 2,000 mg, IntraVENous, Q12H  acetaminophen (TYLENOL) tablet 650 mg, 650 mg, Oral, Q4H PRN **OR** acetaminophen (TYLENOL) suppository 650 mg, 650 mg, Rectal, Q4H PRN  lactulose (CHRONULAC) 10 GM/15ML solution 30 g, 30 g, Oral, BID  amLODIPine (NORVASC) tablet 5 mg, 5 mg, Oral, Daily  insulin NPH (HumuLIN N;NovoLIN N) injection vial 14 Units, 14 Units, SubCUTAneous, QAM  insulin lispro (HUMALOG) injection vial 0-8 Units, 0-8 Units, SubCUTAneous, TID WC  insulin lispro (HUMALOG) injection vial 0-4 Units, 0-4 Units, SubCUTAneous, Nightly  insulin NPH (HumuLIN N;NovoLIN N) injection vial 6 Units, 6 Units, SubCUTAneous, Nightly  insulin regular (HUMULIN R;NOVOLIN R) injection 10 Units, 10 Units, SubCUTAneous, Once  hydrALAZINE (APRESOLINE) injection 5 mg, 5 mg, IntraVENous, Q4H PRN  glucose chewable tablet 16 g, 4 tablet, Oral, PRN  dextrose bolus 10% 125 mL, 125 mL, IntraVENous, PRN **OR** dextrose bolus 10% 250 mL, 250 mL, IntraVENous, PRN  glucagon (rDNA) injection 1 mg, 1 mg, IntraMUSCular, PRN  dextrose 10 % infusion, , IntraVENous, Continuous PRN  aspirin EC tablet 81 mg, 81 mg, Oral, Daily  atorvastatin (LIPITOR) tablet 20 mg, 20 mg, Oral, Daily  clonazePAM (KLONOPIN) tablet 0.5 mg, 0.5 mg, Oral, TID PRN  calcium carb-cholecalciferol 250-3. 125 MG-MCG per tab 1 tablet, 1 tablet, Oral, Daily  gabapentin (NEURONTIN) capsule 600 mg, 600 mg, Oral, TID  lisinopril (PRINIVIL;ZESTRIL) tablet 40 mg, 40 mg, Oral, Daily  paliperidone (INVEGA) extended release tablet 9 mg, 9 mg, Oral, QAM  pantoprazole (PROTONIX) tablet 40 mg, 40 mg, Oral, Daily  traZODone (DESYREL) tablet 200 mg, 200 mg, Oral, Nightly  sodium chloride flush 0.9 % injection 5-40 mL, 5-40 mL, IntraVENous, 2 times per day  sodium chloride flush 0.9 % injection 5-40 mL, 5-40 mL, IntraVENous, PRN  0.9 % sodium chloride infusion, , IntraVENous, PRN  ondansetron (ZOFRAN-ODT) disintegrating tablet 4 mg, 4 mg, Oral, Q8H PRN **OR** ondansetron (ZOFRAN) injection 4 mg, 4 mg, IntraVENous, Q6H PRN  polyethylene glycol (GLYCOLAX) packet 17 g, 17 g, Oral, Daily PRN       Vitals :     Vitals:    03/09/23 0858   BP: 109/65   Pulse: (!) 102   Resp: 15   Temp: 99.3 °F (37.4 °C)   SpO2: 95%       I & O :       Intake/Output Summary (Last 24 hours) at 3/9/2023 1244  Last data filed at 3/9/2023 0335  Gross per 24 hour   Intake 1264.99 ml   Output 1350 ml   Net -85.01 ml        Physical Examination :     General appearance: Has NG tube    Respiratory: normal  Cardiovascular: NAD, Edema none  Abdomen: -Soft has feeding tube   Other relevant findings: -       LABS:     Recent Labs     03/07/23  0824 03/08/23  1353 03/09/23  0520   WBC 3.7* 11.2* 10.5   HGB 8.9* 9.0* 8.1*   HCT 27.8* 29.4* 25.4*    166 142     Recent Labs     03/08/23  0533 03/08/23  1353 03/09/23  0520   * 137 139   K 5.8* 4.9 5.2*   CL 98* 99 105   CO2 18* 24 25   BUN 19 24* 33*   CREATININE 1.7* 2.3* 2.0*   GLUCOSE 415* 273* 174*   MG  --   --  1.60*   PHOS  --   --  3.9            Thanks,   [facility]  [address] [facility]  Office: [phone]  Fax: [phone]

## 2023-03-09 NOTE — CARE COORDINATION
Hospital day 11, POD 2: Patient on C3 re Hypoglycemia due insulin care managed by IM, General Surgery, GI, and Nephrology. Patient from home alone, with 3 flights of steps to enter. Patient may benefit from therapy re eval post op- will need new orders re RN 03/08 reports assisted x2 for care. Son reports open to return to [facility] if Patient agreeable. Patient NPO with NGT. Patient ct with IV ATB. SW following. IBETH Millard

## 2023-03-09 NOTE — PROGRESS NOTES
Hospitalist Progress Note      PCP: Uriel Carpio    Date of Admission: 2/26/2023    Chief Complaint:   Hypoglycemia     Subjective:    Drowsy. Pain controlled. Medications:  Reviewed    Infusion Medications    sodium chloride 100 mL/hr at 03/08/23 2027    dextrose      sodium chloride       Scheduled Medications    metroNIDAZOLE  500 mg IntraVENous Q8H    enoxaparin  30 mg SubCUTAneous Daily    ceFAZolin  2,000 mg IntraVENous Q12H    lactulose  30 g Oral BID    amLODIPine  5 mg Oral Daily    insulin NPH  14 Units SubCUTAneous QAM    insulin lispro  0-8 Units SubCUTAneous TID WC    insulin lispro  0-4 Units SubCUTAneous Nightly    insulin NPH  6 Units SubCUTAneous Nightly    insulin regular  10 Units SubCUTAneous Once    aspirin  81 mg Oral Daily    atorvastatin  20 mg Oral Daily    calcium carb-cholecalciferol  1 tablet Oral Daily    gabapentin  600 mg Oral TID    lisinopril  40 mg Oral Daily    paliperidone  9 mg Oral QAM    pantoprazole  40 mg Oral Daily    traZODone  200 mg Oral Nightly    sodium chloride flush  5-40 mL IntraVENous 2 times per day     PRN Meds: phenol, acetaminophen **OR** acetaminophen, hydrALAZINE, glucose, dextrose bolus **OR** dextrose bolus, glucagon (rDNA), dextrose, clonazePAM, sodium chloride flush, sodium chloride, ondansetron **OR** ondansetron, polyethylene glycol      Intake/Output Summary (Last 24 hours) at 3/9/2023 4200  Last data filed at 3/9/2023 0335  Gross per 24 hour   Intake 2155.35 ml   Output 1475 ml   Net 680.35 ml         Physical Exam Performed:    BP (!) 115/55   Pulse (!) 102   Temp (!) 100.5 °F (38.1 °C) (Oral)   Resp 16   Ht 5' 3\" (1.6 m)   Wt 123 lb (55.8 kg)   SpO2 94%   BMI 21.79 kg/m²     General appearance: No apparent distress, appears stated age and cooperative. HEENT: Pupils equal, round, and reactive to light. Conjunctivae/corneas clear. Neck: Supple, with full range of motion. No jugular venous distention. Trachea midline.   Respiratory: Normal respiratory effort. Clear to auscultation, bilaterally without Rales/Wheezes/Rhonchi. Cardiovascular: Regular rate and rhythm with normal S1/S2 without murmurs, rubs or gallops. Abdomen: Soft, nontender. Positive bowel sounds. Musculoskeletal: No clubbing, cyanosis or edema bilaterally. Full range of motion without deformity. Skin: Skin color, texture, turgor normal.  No rashes or lesions. Neurologic:  Neurovascularly intact without any focal sensory/motor deficits. Cranial nerves: II-XII intact, grossly non-focal.  Psychiatric: Alert and oriented  Capillary Refill: Brisk, 3 seconds, normal   Peripheral Pulses: +2 palpable, equal bilaterally       Labs:   Recent Labs     03/07/23  0824 03/08/23  1353 03/09/23  0520   WBC 3.7* 11.2* 10.5   HGB 8.9* 9.0* 8.1*   HCT 27.8* 29.4* 25.4*    166 142       Recent Labs     03/08/23  0533 03/08/23  1353 03/09/23  0520   * 137 139   K 5.8* 4.9 5.2*   CL 98* 99 105   CO2 18* 24 25   BUN 19 24* 33*   CREATININE 1.7* 2.3* 2.0*   CALCIUM 8.8 8.3 8.2*   PHOS  --   --  3.9       No results for input(s): AST, ALT, BILIDIR, BILITOT, ALKPHOS in the last 72 hours. Recent Labs     03/07/23  0824   INR 0.93       No results for input(s): Polly Wallis in the last 72 hours. Urinalysis:      Lab Results   Component Value Date/Time    NITRU Negative 12/10/2022 01:42 PM    WBCUA 3-5 12/10/2022 01:42 PM    BACTERIA 2+ 12/10/2022 01:42 PM    RBCUA 5-10 12/10/2022 01:42 PM    BLOODU MODERATE 12/10/2022 01:42 PM    SPECGRAV >=1.030 12/10/2022 01:42 PM    GLUCOSEU >=1000 12/10/2022 01:42 PM    GLUCOSEU >=1000 mg/dL 06/07/2010 03:38 PM       Radiology:  XR CHEST PORTABLE   Final Result   No evidence of edema or pneumonia. Mild cardiac silhouette enlargement. CT ABDOMEN PELVIS WO CONTRAST Additional Contrast? None   Final Result   1. No acute abdominal or pelvic findings. 2.  Large amount of formed stool throughout the colon.   Correlate with any   evidence of constipation. XR ABDOMEN (KUB) (SINGLE AP VIEW)   Final Result   1. Moderate stool in the colon, correlate with signs of constipation. US RETROPERITONEAL COMPLETE   Final Result   Left kidney is unremarkable         CT HEAD WO CONTRAST   Final Result   No acute intracranial abnormality. Mild cerebral white matter chronic microvascular ischemic disease. Chronic right maxillary sinus opacification in setting of chronic sinusitis. 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 [address] [C50.911, C50.912]     Tobacco use disorder [F17.200]      \"Patient with PMH of DM 2, HTN, CAD s/p PCI, bipolar disorder presented to the ED today for unresponsive state. Patient was apparently found unresponsive on the toilet while doing her bowel prep for a colonoscopy procedure scheduled tomorrow with [facility]. She also supposed to get an EGD. Patient reports she remembers getting diaphoretic and weak just prior to using the bathroom. And after she sat on the toilet she became very lightheaded and lost her consciousness. She reports she takes 22 units of Lantus in the morning which she did. Was instructed not to eat anything so she drank some water and Gatorade in the morning. She also proceeded to take 8 units of the short acting in the morning.   Patient reports she is not aware that she is not supposed to take the short acting insulin with a meal if she was not going to eat anything for the meal.  She has been on insulin and a diabetic for about 10 years. EMS was called, and they reported her blood sugar was 40 at the scene. \"    Colon Cancer: New diagnosis. Weight loss, abdominal pain, constipation and elevated CEA. Colonoscopy 2/27, pathology report now finalized and showed invasive Adenocarcinoma. S/p Colectomy 3/7. NGT clamping trial.     Hypoglycemia due to insulin use with decreased p.o. intake. Blood sugars are labile. Patient switched to NPH. Sliding scale increased. Will monitor closely while NPO. SHARRON with acidosis. 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 Patient was on a bicarb drip. Improved but then worsened again last few days. Resume hydration post-op. Monitor renal function. Hypertension. Blood pressure labile. PO meds on hold while NPO. Monitor.      DVT Prophylaxis: Lovenox  Diet: Diet NPO Exceptions are: Sips of Water with Meds  Code Status: Full Code  PT/OT Eval Status: patient ambulatory at home    Dispo - ?2-3 days    Amy Rodriguez MD

## 2023-03-09 NOTE — CONSULTS
Consult Call Back    Who:Dr. Juan Peters  Date:3/9/2023,  Time:1:07 PM    Electronically signed by Ariana Swanson on 3/9/23 at 1:07 PM EST

## 2023-03-09 NOTE — PLAN OF CARE
Pain mangagement.      Problem: Pain  Goal: Verbalizes/displays adequate comfort level or baseline comfort level  Outcome: Progressing

## 2023-03-09 NOTE — PROGRESS NOTES
[facility] GENERAL SURGERY    Surgery Progress Note           POD # 2    PATIENT NAME: Mei Barboza     TODAY'S DATE: 3/9/2023    INTERVAL HISTORY:    Pt awake and alert - complaining of ngt discomfort. Reports she is passing flatus     OBJECTIVE:   VITALS:  /65   Pulse (!) 102   Temp 99.3 °F (37.4 °C) (Oral)   Resp 15   Ht 5' 3\" (1.6 m)   Wt 123 lb (55.8 kg)   SpO2 95%   BMI 21.79 kg/m²     INTAKE/OUTPUT:    I/O last 3 completed shifts: In: 2155.4 [P.O.:330; I.V.:495.3; NG/GT:60; IV Piggyback:1270.1]  Out: 2850 [Urine:800; Emesis/NG output:1350; Drains:700]  No intake/output data recorded. CONSTITUTIONAL:  fatigued   LUNGS:  no crackles or wheezing  ABDOMEN:   hypoactive bowel sounds, soft, non-distended, tenderness noted midline incision, bryanna drain sanguinous   INCISION: scant amt drainage on dressing. Data:  CBC:   Recent Labs     03/07/23  0824 03/08/23  1353 03/09/23  0520   WBC 3.7* 11.2* 10.5   HGB 8.9* 9.0* 8.1*   HCT 27.8* 29.4* 25.4*    166 142       BMP:    Recent Labs     03/08/23  0533 03/08/23  1353 03/09/23  0520   * 137 139   K 5.8* 4.9 5.2*   CL 98* 99 105   CO2 18* 24 25   BUN 19 24* 33*   CREATININE 1.7* 2.3* 2.0*   GLUCOSE 415* 273* 174*       Hepatic: No results for input(s): AST, ALT, ALB, BILITOT, ALKPHOS in the last 72 hours.   Mag:      Recent Labs     03/09/23  0520   MG 1.60*        Phos:     Recent Labs     03/09/23  0520   PHOS 3.9        INR:   Recent Labs     03/07/23  0824   INR 0.93           ASSESSMENT AND PLAN:  60 y.o. female status post Robotic converted to open right colectomy      GI: continue with ngt to suction today - start clamping ngt  : continue with petersen to monitor I/O  SHARRON: nephrology managing  Pain: continue with current orders  Activity: OOB to chair  Path: pending      Electronically signed by SARTHAK Gutierrez - CNP     Surgery Staff    I have examined this patient, and read and agree with the note by Sandy Walker PIETER Cassidy from today; more than half of the total time was spent by me on the encounter. Should be able to remove NGT soon and begin PO diet, hopefully tomorrow.     Await Pathology results    Krysta Dejesus MD

## 2023-03-09 NOTE — PROGRESS NOTES
After having anxiety medication with tylenol midshift this patient looks to be resting more peacefully. Vitals have remained stable. Temperature 100.5.     Turned down the temperature in the room during the last vitals check.     Continuing to monitor.

## 2023-03-10 LAB
ANION GAP SERPL CALCULATED.3IONS-SCNC: 11 MMOL/L (ref 3–16)
BUN BLDV-MCNC: 33 MG/DL (ref 7–20)
CALCIUM SERPL-MCNC: 8.1 MG/DL (ref 8.3–10.6)
CHLORIDE BLD-SCNC: 106 MMOL/L (ref 99–110)
CO2: 25 MMOL/L (ref 21–32)
CREAT SERPL-MCNC: 1.5 MG/DL (ref 0.6–1.2)
GFR SERPL CREATININE-BSD FRML MDRD: 39 ML/MIN/{1.73_M2}
GLUCOSE BLD-MCNC: 265 MG/DL (ref 70–99)
GLUCOSE BLD-MCNC: 290 MG/DL (ref 70–99)
GLUCOSE BLD-MCNC: 307 MG/DL (ref 70–99)
GLUCOSE BLD-MCNC: 354 MG/DL (ref 70–99)
GLUCOSE BLD-MCNC: 55 MG/DL (ref 70–99)
GLUCOSE BLD-MCNC: 55 MG/DL (ref 70–99)
GLUCOSE BLD-MCNC: 64 MG/DL (ref 70–99)
GLUCOSE BLD-MCNC: 64 MG/DL (ref 70–99)
GLUCOSE BLD-MCNC: 68 MG/DL (ref 70–99)
GLUCOSE BLD-MCNC: 74 MG/DL (ref 70–99)
PERFORMED ON: ABNORMAL
PERFORMED ON: NORMAL
POTASSIUM REFLEX MAGNESIUM: 3.8 MMOL/L (ref 3.5–5.1)
SODIUM BLD-SCNC: 142 MMOL/L (ref 136–145)

## 2023-03-10 PROCEDURE — 2580000003 HC RX 258: Performed by: SURGERY

## 2023-03-10 PROCEDURE — 6370000000 HC RX 637 (ALT 250 FOR IP): Performed by: INTERNAL MEDICINE

## 2023-03-10 PROCEDURE — 80048 BASIC METABOLIC PNL TOTAL CA: CPT

## 2023-03-10 PROCEDURE — 1200000000 HC SEMI PRIVATE

## 2023-03-10 PROCEDURE — 6370000000 HC RX 637 (ALT 250 FOR IP): Performed by: SURGERY

## 2023-03-10 PROCEDURE — APPSS30 APP SPLIT SHARED TIME 16-30 MINUTES: Performed by: CLINICAL NURSE SPECIALIST

## 2023-03-10 PROCEDURE — 6360000002 HC RX W HCPCS: Performed by: INTERNAL MEDICINE

## 2023-03-10 PROCEDURE — 6360000002 HC RX W HCPCS: Performed by: SURGERY

## 2023-03-10 PROCEDURE — 2500000003 HC RX 250 WO HCPCS: Performed by: SURGERY

## 2023-03-10 PROCEDURE — 36415 COLL VENOUS BLD VENIPUNCTURE: CPT

## 2023-03-10 PROCEDURE — 2700000000 HC OXYGEN THERAPY PER DAY

## 2023-03-10 PROCEDURE — 94761 N-INVAS EAR/PLS OXIMETRY MLT: CPT

## 2023-03-10 RX ORDER — INSULIN GLARGINE 100 [IU]/ML
10 INJECTION, SOLUTION SUBCUTANEOUS DAILY
Status: DISCONTINUED | OUTPATIENT
Start: 2023-03-11 | End: 2023-03-13

## 2023-03-10 RX ORDER — ENOXAPARIN SODIUM 100 MG/ML
40 INJECTION SUBCUTANEOUS DAILY
Status: DISCONTINUED | OUTPATIENT
Start: 2023-03-10 | End: 2023-03-15 | Stop reason: HOSPADM

## 2023-03-10 RX ORDER — AMLODIPINE BESYLATE 5 MG/1
5 TABLET ORAL DAILY
Status: DISCONTINUED | OUTPATIENT
Start: 2023-03-10 | End: 2023-03-15 | Stop reason: HOSPADM

## 2023-03-10 RX ADMIN — PANTOPRAZOLE SODIUM 40 MG: 40 TABLET, DELAYED RELEASE ORAL at 09:36

## 2023-03-10 RX ADMIN — Medication 1 TABLET: at 09:36

## 2023-03-10 RX ADMIN — Medication 2000 MG: at 21:19

## 2023-03-10 RX ADMIN — METRONIDAZOLE 500 MG: 500 INJECTION, SOLUTION INTRAVENOUS at 09:38

## 2023-03-10 RX ADMIN — Medication 16 G: at 11:35

## 2023-03-10 RX ADMIN — Medication 2000 MG: at 11:41

## 2023-03-10 RX ADMIN — TRAZODONE HYDROCHLORIDE 200 MG: 50 TABLET ORAL at 21:20

## 2023-03-10 RX ADMIN — ENOXAPARIN SODIUM 40 MG: 100 INJECTION SUBCUTANEOUS at 09:35

## 2023-03-10 RX ADMIN — AMLODIPINE BESYLATE 5 MG: 5 TABLET ORAL at 11:52

## 2023-03-10 RX ADMIN — SODIUM CHLORIDE: 9 INJECTION, SOLUTION INTRAVENOUS at 07:03

## 2023-03-10 RX ADMIN — METRONIDAZOLE 500 MG: 500 INJECTION, SOLUTION INTRAVENOUS at 02:56

## 2023-03-10 RX ADMIN — LACTULOSE 30 G: 10 SOLUTION ORAL at 09:34

## 2023-03-10 RX ADMIN — INSULIN LISPRO 4 UNITS: 100 INJECTION, SOLUTION INTRAVENOUS; SUBCUTANEOUS at 21:26

## 2023-03-10 RX ADMIN — METRONIDAZOLE 500 MG: 500 INJECTION, SOLUTION INTRAVENOUS at 16:39

## 2023-03-10 RX ADMIN — ASPIRIN 81 MG: 81 TABLET, COATED ORAL at 09:36

## 2023-03-10 RX ADMIN — SODIUM CHLORIDE, PRESERVATIVE FREE 10 ML: 5 INJECTION INTRAVENOUS at 21:20

## 2023-03-10 RX ADMIN — PALIPERIDONE 9 MG: 3 TABLET, EXTENDED RELEASE ORAL at 09:35

## 2023-03-10 RX ADMIN — ATORVASTATIN CALCIUM 20 MG: 10 TABLET, FILM COATED ORAL at 09:36

## 2023-03-10 RX ADMIN — SODIUM CHLORIDE, PRESERVATIVE FREE 10 ML: 5 INJECTION INTRAVENOUS at 09:37

## 2023-03-10 RX ADMIN — GABAPENTIN 600 MG: 300 CAPSULE ORAL at 21:20

## 2023-03-10 RX ADMIN — GABAPENTIN 600 MG: 300 CAPSULE ORAL at 09:36

## 2023-03-10 RX ADMIN — GABAPENTIN 600 MG: 300 CAPSULE ORAL at 15:02

## 2023-03-10 ASSESSMENT — PAIN SCALES - GENERAL
PAINLEVEL_OUTOF10: 0
PAINLEVEL_OUTOF10: 2

## 2023-03-10 NOTE — PROGRESS NOTES
Patient's bld sugar was 55 when checked. She was just advanced to clear liquid diet. She was given the order for glucose tabs and apple juice. She was rechecked and continued to be 55. She was given more juice as ordered and rechecked and bld glucose was 68. MD duarte.

## 2023-03-10 NOTE — PLAN OF CARE
Problem: Discharge Planning  Goal: Discharge to home or other facility with appropriate resources  Outcome: Progressing  Flowsheets (Taken 3/10/2023 0306)  Discharge to home or other facility with appropriate resources:   Identify barriers to discharge with patient and caregiver   Identify discharge learning needs (meds, wound care, etc)   Arrange for needed discharge resources and transportation as appropriate   Arrange for interpreters to assist at discharge as needed   Refer to discharge planning if patient needs post-hospital services based on physician order or complex needs related to functional status, cognitive ability or social support system     Problem: Pain  Goal: Verbalizes/displays adequate comfort level or baseline comfort level  Outcome: Progressing  Flowsheets (Taken 3/10/2023 0306)  Verbalizes/displays adequate comfort level or baseline comfort level:   Encourage patient to monitor pain and request assistance   Assess pain using appropriate pain scale   Implement non-pharmacological measures as appropriate and evaluate response   Administer analgesics based on type and severity of pain and evaluate response   Consider cultural and social influences on pain and pain management   Notify Licensed Independent Practitioner if interventions unsuccessful or patient reports new pain

## 2023-03-10 NOTE — PROGRESS NOTES
Interval History and plan:      Her blood pressure is going up  On 100 mL/h of normal saline  Creatinine is getting bettter and electrolytes are stable  Plan:    Still has NG tube  Possible removal and she will be able to take p.o.  Till then will need to continue some fluids but will decrease to 50 ml per hour to avoid fluid overload and hypertension  Her blood pressure is getting better high now Will resume amlodipine                     Assessment :     Acute Kidney Injury  Creatinine peaked to 2.3-2 on consult  Creatinine mhlihmka-5-1.3  She is known to have CKD 2 baseline-likely due to nephrectomy, recurrent SHARRON, diabetes  She has a history of Wilms tumor and nephrectomy  Had SHARRON on 2/21-seen by me again.  Her protein creatinine ratio was normal  She also has diabetes mellitus on insulin-which is a risk factor for CKD         Hypertension   BP: (151)/(66)  Heart Rate:  [74]   BP goal inpatient 130-140 systolic inpatient  Blood pressure is relatively soft  She is also known to have atrial fibrillation        [facility] would like to thank Terell Perales MD   for opportunity to serve this patient      Please call with questions at-   24 Hrs Answering service [phone] or  7 am- 5 pm via Perfect serve or cell phone  Dr.Sudhir Temo MD          CC/reason for consult :     SHARRON     HPI :     Agustina Fried is a 62 y.o. female presented to   the hospital on 2/26/2023 with altered mental status.  She was unresponsive on presentation.  She was getting bowel preparation for colonoscopy and fell in the bathroom preceded by dizziness weakness and swelling.  She also lost her consciousness.  She was taking short acting insulin in addition to Lantus despite of being NPO.  She is known to have diabetes on insulin.  EMS was called.  She was found to have blood sugar of 40 which was treated in route and she was brought to the emergency room.  She had a colonoscopy done inpatient and had a biopsy done  which showed lesion in the hepatic flexure came back positive for colon cancer. She had a colon surgery done this hospitalization  Her course of hospitalization is complicated by SHARRON. ROS:     Seen with-no family SHARRON    positives in bold   Constitutional:  fever, chills, weakness, weight change, fatigue  Skin:  rash, pruritus, hair loss, bruising, dry skin, petechiae  Head, Face, Neck   headaches, swelling,  cervical adenopathy  Respiratory: shortness of breath, cough, or wheezing  Cardiovascular: chest pain, palpitations, dizzy, edema  Gastrointestinal: nausea, vomiting, diarrhea, constipation,belly pain    Yellow skin, blood in stool  Musculoskeletal:  back pain, muscle weakness, gait problems,       joint pain or swelling. Genitourinary:  dysuria, poor urine flow, flank pain, blood in urine  Neurologic:  vertigo, TIA'S, syncope, seizures, focal weakness  Psychosocial:  insomnia, anxiety, or depression. Additional positive findings:                    All other remaining systems are negative or unable to obtain        PMH/PSH/SH/Family History:     Past Medical History:   Diagnosis Date    Abnormal brain MRI 7/20/2017    Partially empty sella and minimal chronic small vessel ischemic disease    Acute bilateral low back pain without sciatica 11/2/2016    SHARRON (acute kidney injury) ([address] 7/5/2017    Arthritis     back    Bipolar disorder ([address] 10/18/2008    CAD (coronary artery disease)     stent placed 6/8/20    Cancer ([address] 2015    bilateral breast:s/p lumpectomy/radiation:under care care of breast specialist:Dr. Boone     Carotid stenosis, bilateral:<50%:per US 7/2016 7/15/2016    Carpal tunnel syndrome 10/18/2008    Cervical cancer screening 2014    Nml per pt'.     Coronary artery disease of native artery of native heart with stable angina pectoris ([address] 6/9/2020    DDD (degenerative disc disease), lumbar 7/18/2018    Depression     under care of pschiatrist:Dr. Eladio Rosario    Depression/anxiety 7/5/2017 Depression/anxiety     Diabetes mellitus ([facility] [address]     Gout     History of mammogram 10/28/2016;8/14/17    Negative    History of therapeutic radiation     Hyperlipidemia     Hypertension     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 stage 3 chronic kidney disease ([facility] [address] 9/17/2017    Microalbuminuria 7/1/2016    Neuropathy in diabetes [facility])     Non morbid obesity 7/1/2016    Pancreatitis 5/12/16    [facility] hospitalization 5/12/16-5/16/16:under care of GI:chronic pancreatitis    S/P endoscopy 6/14/2016    [place]:per pt' & her family member was nml.     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 myelopathy or radiculopathy 3/10/2017    Transient cerebral ischemia 07/15/2016    TIA:7/10/16    Unspecified cerebral artery occlusion with cerebral infarction     TIA       Past Surgical History:   Procedure Laterality Date    BREAST LUMPECTOMY  2015    Bilateral:breast cancer    CARDIAC CATHETERIZATION  06/08/2020    Dr. Felipe Burgess), DAYANA to Diag 1    COLONOSCOPY N/A 2/1/2021    COLONOSCOPY DIAGNOSTIC performed by Jorge Mejia MD at [address] N/A 2/27/2023    COLONOSCOPY WITH BIOPSY performed by Jorge Mejia MD at [place] [address]  2/3/2021    CT BONE MARROW BIOPSY 2/3/2021 Fiorella Rivera MD [facility] CT SCAN    HEMICOLECTOMY N/A 3/7/2023    ROBOTIC CONVERTED TO OPEN RIGHT COLECTOMY performed by Yulia Nava MD at [facility] ([facility])      Benign:no cervical cancer per pt'    KIDNEY REMOVAL      right    OTHER SURGICAL HISTORY Right     orif right ankle    TEMPORAL ARTERY BIOPSY Right 8/9/2021    RIGHT TEMPORAL ARTERY BIOPSY LIGATION performed by Maddie Eduardo MD at [address]  1/29/2021    EGD BIOPSY performed by Jorge Mejia MD at [address] 12/15/2022    EGD BIOPSY performed by Livier Hernandez Israel Espana MD at [address] [place] [address]        reports that she quit smoking about 8 years ago. Her smoking use included cigarettes and cigars. She has a 10.00 pack-year smoking history. She has never used smokeless tobacco. She reports that she does not drink alcohol and does not use drugs. family history includes Cancer in her father and mother.          Medication:     Current Facility-Administered Medications: enoxaparin (LOVENOX) injection 40 mg, 40 mg, SubCUTAneous, Daily  traMADol (ULTRAM) tablet 50 mg, 50 mg, Oral, Q6H PRN  metronidazole (FLAGYL) 500 mg in 0.9% NaCl 100 mL IVPB premix, 500 mg, IntraVENous, Q8H  0.9 % sodium chloride infusion, , IntraVENous, Continuous  phenol 1.4 % mouth spray 1 spray, 1 spray, Mouth/Throat, Q2H PRN  ceFAZolin (ANCEF) 2000 mg in 0.9% sodium chloride 100 mL IVPB, 2,000 mg, IntraVENous, Q12H  acetaminophen (TYLENOL) tablet 650 mg, 650 mg, Oral, Q4H PRN **OR** acetaminophen (TYLENOL) suppository 650 mg, 650 mg, Rectal, Q4H PRN  lactulose (CHRONULAC) 10 GM/15ML solution 30 g, 30 g, Oral, BID  insulin NPH (HumuLIN N;NovoLIN N) injection vial 14 Units, 14 Units, SubCUTAneous, QAM  insulin lispro (HUMALOG) injection vial 0-8 Units, 0-8 Units, SubCUTAneous, TID WC  insulin lispro (HUMALOG) injection vial 0-4 Units, 0-4 Units, SubCUTAneous, Nightly  insulin NPH (HumuLIN N;NovoLIN N) injection vial 6 Units, 6 Units, SubCUTAneous, Nightly  insulin regular (HUMULIN R;NOVOLIN R) injection 10 Units, 10 Units, SubCUTAneous, Once  hydrALAZINE (APRESOLINE) injection 5 mg, 5 mg, IntraVENous, Q4H PRN  glucose chewable tablet 16 g, 4 tablet, Oral, PRN  dextrose bolus 10% 125 mL, 125 mL, IntraVENous, PRN **OR** dextrose bolus 10% 250 mL, 250 mL, IntraVENous, PRN  glucagon (rDNA) injection 1 mg, 1 mg, IntraMUSCular, PRN  dextrose 10 % infusion, , IntraVENous, Continuous PRN  aspirin EC tablet 81 mg, 81 mg, Oral, Daily  atorvastatin (LIPITOR) tablet 20 mg, 20 mg, Oral, Daily  clonazePAM (Lisbet Lima) tablet 0.5 mg, 0.5 mg, Oral, TID PRN  calcium carb-cholecalciferol 250-3. 125 MG-MCG per tab 1 tablet, 1 tablet, Oral, Daily  gabapentin (NEURONTIN) capsule 600 mg, 600 mg, Oral, TID  paliperidone (INVEGA) extended release tablet 9 mg, 9 mg, Oral, QAM  pantoprazole (PROTONIX) tablet 40 mg, 40 mg, Oral, Daily  traZODone (DESYREL) tablet 200 mg, 200 mg, Oral, Nightly  sodium chloride flush 0.9 % injection 5-40 mL, 5-40 mL, IntraVENous, 2 times per day  sodium chloride flush 0.9 % injection 5-40 mL, 5-40 mL, IntraVENous, PRN  0.9 % sodium chloride infusion, , IntraVENous, PRN  ondansetron (ZOFRAN-ODT) disintegrating tablet 4 mg, 4 mg, Oral, Q8H PRN **OR** ondansetron (ZOFRAN) injection 4 mg, 4 mg, IntraVENous, Q6H PRN  polyethylene glycol (GLYCOLAX) packet 17 g, 17 g, Oral, Daily PRN       Vitals :     Vitals:    03/10/23 0800   BP: (!) 151/66   Pulse: 74   Resp: 18   Temp: 98.7 °F (37.1 °C)   SpO2:        I & O :       Intake/Output Summary (Last 24 hours) at 3/10/2023 1051  Last data filed at 3/10/2023 0555  Gross per 24 hour   Intake 2709 ml   Output 2055 ml   Net 654 ml          Physical Examination :     General appearance: Has NG tube    Respiratory: normal  Cardiovascular: NAD, Edema none  Abdomen: -Soft has feeding tube   Other relevant findings: -       LABS:     Recent Labs     03/08/23  1353 03/09/23  0520   WBC 11.2* 10.5   HGB 9.0* 8.1*   HCT 29.4* 25.4*    142       Recent Labs     03/08/23  1353 03/09/23  0520 03/10/23  0521    139 142   K 4.9 5.2* 3.8   CL 99 105 106   CO2 24 25 25   BUN 24* 33* 33*   CREATININE 2.3* 2.0* 1.5*   GLUCOSE 273* 174* 64*   MG  --  1.60*  --    PHOS  --  3.9  --               Thanks,   [facility]  [address]  Office: [phone]  Fax: [phone]

## 2023-03-10 NOTE — PROGRESS NOTES
[facility] GENERAL SURGERY    Surgery Progress Note           POD # 3    PATIENT NAME: Nelson Padgett     TODAY'S DATE: 3/10/2023    INTERVAL HISTORY:    Pt awake and alert - reports she is passing flatus, had BMs. Tolerating ngt clamping     OBJECTIVE:   VITALS:  BP (!) 151/66   Pulse 74   Temp 98.7 °F (37.1 °C) (Oral)   Resp 18   Ht 5' 3\" (1.6 m)   Wt 123 lb (55.8 kg)   SpO2 100%   BMI 21.79 kg/m²     INTAKE/OUTPUT:    I/O last 3 completed shifts: In: 2709 [I.V.:1853; IV Piggyback:856.1]  Out: 4176 [Urine:2125; Emesis/NG output:300; Drains:280]  No intake/output data recorded. CONSTITUTIONAL:  awake, alert  LUNGS:  no crackles or wheezing  ABDOMEN:   hypoactive bowel sounds, soft, non-distended, tenderness noted midline incision, bryanna drain sanguinous   INCISION: no erythema or drainage, staples intact    Data:  CBC:   Recent Labs     03/08/23  1353 03/09/23  0520   WBC 11.2* 10.5   HGB 9.0* 8.1*   HCT 29.4* 25.4*    142       BMP:    Recent Labs     03/08/23  1353 03/09/23  0520 03/10/23  0521    139 142   K 4.9 5.2* 3.8   CL 99 105 106   CO2 24 25 25   BUN 24* 33* 33*   CREATININE 2.3* 2.0* 1.5*   GLUCOSE 273* 174* 64*       Hepatic: No results for input(s): AST, ALT, ALB, BILITOT, ALKPHOS in the last 72 hours. Mag:      Recent Labs     03/09/23  0520   MG 1.60*        Phos:     Recent Labs     03/09/23  0520   PHOS 3.9        INR:   No results for input(s): INR in the last 72 hours.         ASSESSMENT AND PLAN:  60 y.o. female status post Robotic converted to open right colectomy      GI: remove ngt and start clear liquid diet  : continue with petersen to monitor I/O - removal per nephrology  SHARRON: nephrology managing, Cr trending down  Pain: continue with current orders  Activity: OOB to chair  Path: reviewed - oncology following      Electronically signed by SARTHAK Sahu CNP       Surgery Staff    I have examined this patient, and read and agree with the note by Onofre Bueno CNP from today; more than half of the total time was spent by me on the encounter.      Celestina Dakins, MD

## 2023-03-10 NOTE — PLAN OF CARE
Problem: Metabolic/Fluid and Electrolytes - Adult  Goal: Glucose maintained within prescribed range  Outcome: Progressing   MD made aware of low blood sugar and new order received.

## 2023-03-10 NOTE — PROGRESS NOTES
Hospitalist Progress Note      PCP: Irina Conway    Date of Admission: 2/26/2023    Chief Complaint:   Hypoglycemia     Subjective:    Alert. Pain better controlled. NGT out. Medications:  Reviewed    Infusion Medications    sodium chloride 100 mL/hr at 03/10/23 0703    dextrose      sodium chloride       Scheduled Medications    enoxaparin  40 mg SubCUTAneous Daily    metroNIDAZOLE  500 mg IntraVENous Q8H    ceFAZolin  2,000 mg IntraVENous Q12H    lactulose  30 g Oral BID    insulin NPH  14 Units SubCUTAneous QAM    insulin lispro  0-8 Units SubCUTAneous TID WC    insulin lispro  0-4 Units SubCUTAneous Nightly    insulin NPH  6 Units SubCUTAneous Nightly    insulin regular  10 Units SubCUTAneous Once    aspirin  81 mg Oral Daily    atorvastatin  20 mg Oral Daily    calcium carb-cholecalciferol  1 tablet Oral Daily    gabapentin  600 mg Oral TID    paliperidone  9 mg Oral QAM    pantoprazole  40 mg Oral Daily    traZODone  200 mg Oral Nightly    sodium chloride flush  5-40 mL IntraVENous 2 times per day     PRN Meds: traMADol, phenol, acetaminophen **OR** acetaminophen, hydrALAZINE, glucose, dextrose bolus **OR** dextrose bolus, glucagon (rDNA), dextrose, clonazePAM, sodium chloride flush, sodium chloride, ondansetron **OR** ondansetron, polyethylene glycol      Intake/Output Summary (Last 24 hours) at 3/10/2023 0954  Last data filed at 3/10/2023 0555  Gross per 24 hour   Intake 2709 ml   Output 2055 ml   Net 654 ml         Physical Exam Performed:    BP (!) 151/66   Pulse 74   Temp 98.7 °F (37.1 °C) (Oral)   Resp 18   Ht 5' 3\" (1.6 m)   Wt 123 lb (55.8 kg)   SpO2 100%   BMI 21.79 kg/m²     General appearance: No apparent distress, appears stated age and cooperative. HEENT: Pupils equal, round, and reactive to light. Conjunctivae/corneas clear. Neck: Supple, with full range of motion. No jugular venous distention. Trachea midline. Respiratory:  Normal respiratory effort.  Clear to auscultation, bilaterally without Rales/Wheezes/Rhonchi. Cardiovascular: Regular rate and rhythm with normal S1/S2 without murmurs, rubs or gallops. Abdomen: Soft, nontender. Positive bowel sounds. Musculoskeletal: No clubbing, cyanosis or edema bilaterally. Full range of motion without deformity. Skin: Skin color, texture, turgor normal.  No rashes or lesions. Neurologic:  Neurovascularly intact without any focal sensory/motor deficits. Cranial nerves: II-XII intact, grossly non-focal.  Psychiatric: Alert and oriented  Capillary Refill: Brisk, 3 seconds, normal   Peripheral Pulses: +2 palpable, equal bilaterally       Labs:   Recent Labs     03/08/23  1353 03/09/23  0520   WBC 11.2* 10.5   HGB 9.0* 8.1*   HCT 29.4* 25.4*    142       Recent Labs     03/08/23  1353 03/09/23  0520 03/10/23  0521    139 142   K 4.9 5.2* 3.8   CL 99 105 106   CO2 24 25 25   BUN 24* 33* 33*   CREATININE 2.3* 2.0* 1.5*   CALCIUM 8.3 8.2* 8.1*   PHOS  --  3.9  --        No results for input(s): AST, ALT, BILIDIR, BILITOT, ALKPHOS in the last 72 hours. No results for input(s): INR in the last 72 hours. No results for input(s): Marvie Forget in the last 72 hours. Urinalysis:      Lab Results   Component Value Date/Time    NITRU Negative 12/10/2022 01:42 PM    WBCUA 3-5 12/10/2022 01:42 PM    BACTERIA 2+ 12/10/2022 01:42 PM    RBCUA 5-10 12/10/2022 01:42 PM    BLOODU MODERATE 12/10/2022 01:42 PM    SPECGRAV >=1.030 12/10/2022 01:42 PM    GLUCOSEU >=1000 12/10/2022 01:42 PM    GLUCOSEU >=1000 mg/dL 06/07/2010 03:38 PM       Radiology:  XR CHEST PORTABLE   Final Result   No evidence of edema or pneumonia. Mild cardiac silhouette enlargement. CT ABDOMEN PELVIS WO CONTRAST Additional Contrast? None   Final Result   1. No acute abdominal or pelvic findings. 2.  Large amount of formed stool throughout the colon. Correlate with any   evidence of constipation.          XR ABDOMEN (KUB) (SINGLE AP VIEW) Final Result   1. Moderate stool in the colon, correlate with signs of constipation. US RETROPERITONEAL COMPLETE   Final Result   Left kidney is unremarkable         CT HEAD WO CONTRAST   Final Result   No acute intracranial abnormality. Mild cerebral white matter chronic microvascular ischemic disease. Chronic right maxillary sinus opacification in setting of chronic sinusitis. 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 Patient was apparently found unresponsive on the toilet while doing her bowel prep for a colonoscopy procedure scheduled tomorrow with [facility]. She also supposed to get an EGD. Patient reports she remembers getting diaphoretic and weak just prior to using the bathroom. And after she sat on the toilet she became very lightheaded and lost her consciousness. She reports she takes 22 units of Lantus in the morning which she did. Was instructed not to eat anything so she drank some water and Gatorade in the morning. She also proceeded to take 8 units of the short acting in the morning.   Patient reports she is not aware that she is not supposed to take the short acting insulin with a meal if she was not going to eat anything for the meal.  She has been on insulin and a diabetic for about 10 years.  EMS was called, and they reported her blood sugar was 40 at the scene. \"    Colon Cancer: New diagnosis. Weight loss, abdominal pain, constipation and elevated CEA. Colonoscopy 2/27, pathology report now finalized and showed invasive Adenocarcinoma. S/p Colectomy 3/7. Pathology suggested localized disease; per Oncology, may not need additional chemotherapy. NGT out. Advance diet as tolerated. Pain control. Hypoglycemia due to insulin use with decreased p.o. intake. Blood sugars were labile pre-op and patient switched to NPH. Now with low BS as diet being advanced slowly. Will resume lower dose Lantus with SSI and monitor. SHARRON with acidosis. 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 Patient was on a bicarb drip. Improved but then worsened again last few days post-op. Continue hydration. Nephrology following. Monitor renal function. Hypertension. Blood pressure labile. PO meds on hold while NPO. Monitor. DVT Prophylaxis: Lovenox  Diet: Diet NPO Exceptions are: Sips of Water with Meds  Code Status: Full Code  PT/OT Eval Status: patient ambulatory at home. Therapy re-ordered post-op. Dispo - ?2-3 days.  May need to consider SNF pending therapy    Render Boxer, MD

## 2023-03-10 NOTE — PROGRESS NOTES
ONCOLOGY HEMATOLOGY CARE PROGRESS NOTE      SUBJECTIVE:    The patient states she feels better this morning. She would like her NG tube out. ROS:     Constitutional:  No weight loss, No fever, No chills, No night sweats. Energy level good.   Eyes:  No impairment or change in vision  ENT / Mouth:  No pain, abnormal ulceration, bleeding, nasal drip or change in voice or hearing  Cardiovascular:  No chest pain, palpitations, new edema, or calf discomfort  Respiratory:  No pain, hemoptysis, change to breathing  Breast:  No pain, discharge, change in appearance or texture  Gastrointestinal:  No pain, cramping, jaundice, change to eating and bowel habits  Urinary:  No pain, bleeding or change in continence  Genitalia: No pain, bleeding or discharge  Musculoskeletal:  No redness, pain, edema or weakness  Skin:  No pruritus, rash, change to nodules or lesions  Neurologic:  No discomfort, change in mental status, speech, sensory or motor activity  Psychiatric:  No change in concentration or change to affect or mood  Endocrine:  No hot flashes, increased thirst, or change to urine production  Hematologic: No petechiae, ecchymosis or bleeding  Lymphatic:  No lymphadenopathy or lymphedema  Allergy / Immunologic:  No eczema, hives, frequent or recurrent infections    OBJECTIVE        Physical    VITALS:  Patient Vitals for the past 24 hrs:   BP Temp Temp src Pulse Resp SpO2   03/10/23 0800 (!) 151/66 98.7 °F (37.1 °C) Oral 74 18 --   03/10/23 0600 -- -- -- -- -- 100 %   03/10/23 0100 -- -- -- -- -- 92 %   03/10/23 0045 136/69 98.8 °F (37.1 °C) Oral 90 16 (!) 88 %   03/09/23 2048 -- -- -- -- 16 --   03/09/23 1840 (!) 144/68 100 °F (37.8 °C) Oral 89 16 92 %   03/09/23 0858 109/65 99.3 °F (37.4 °C) Oral (!) 102 15 95 %       24HR INTAKE/OUTPUT:    Intake/Output Summary (Last 24 hours) at 3/10/2023 0825  Last data filed at 3/10/2023 0555  Gross per 24 hour   Intake 2709 ml   Output 2055 ml Net 654 ml       CONSTITUTIONAL: awake, alert, cooperative, no apparent distress. NG tube in place  EYES: pupils equal, round and reactive to light, sclera clear and conjunctiva normal  ENT: Normocephalic, without obvious abnormality, atraumatic  NECK: supple, symmetrical, no jugular venous distension and no carotid bruits   HEMATOLOGIC/LYMPHATIC: no cervical, supraclavicular or axillary lymphadenopathy   LUNGS: no increased work of breathing and clear to auscultation   CARDIOVASCULAR: regular rate and rhythm, normal S1 and S2, no murmur noted  ABDOMEN: Bowel sounds are hypoactive, but there is no rebound or guarding. HEATHER drain is clear  MUSCULOSKELETAL: full range of motion noted, tone is normal  NEUROLOGIC: awake, alert, oriented to name, place and time. Motor skills grossly intact. SKIN: Normal skin color, texture, turgor and no jaundice. appears intact   EXTREMITIES: no LE edema     DATA:  CBC:    Recent Labs     03/09/23  0520 03/08/23  1353 03/07/23  0824 03/04/23  1034 03/01/23  0428 02/28/23  0338 02/26/23  1930   WBC 10.5 11.2* 3.7* 5.0   < > 4.3 9.3   NEUTROABS  --  9.8* 2.0  --   --  2.4 7.0   LYMPHOPCT  --  7.4 34.5  --   --  34.1 17.9   RBC 2.84* 3.24* 3.14* 3.24*   < > 3.06* 3.66*   HGB 8.1* 9.0* 8.9* 9.2*   < > 8.5* 10.1*   HCT 25.4* 29.4* 27.8* 28.7*   < > 27.4* 33.0*   MCV 89.4 90.8 88.5 88.5   < > 89.5 90.0   MCH 28.6 28.0 28.2 28.4   < > 28.0 27.5   MCHC 31.9 30.8* 31.9 32.1   < > 31.3 30.5*   RDW 14.8 15.2 14.4 13.9   < > 14.1 14.1    166 164 173   < > 143 161    < > = values in this interval not displayed. PT/INR:    Recent Labs     03/07/23  0824 02/26/23  1930 02/10/23  2123   PROT  --  7.1 7.0   INR 0.93  --   --      PTT:  No results for input(s): APTT in the last 720 hours.     CMP:    Recent Labs     03/10/23  0521 03/09/23  0520 03/08/23  1353 03/08/23  0533 03/07/23  0629 03/05/23  0618 02/27/23  0710 02/26/23 1930 02/10/23  2123    139 137 135*   < > 139   < > 135* 131*   K 3.8 5.2* 4.9 5.8*   < > 4.3   < > 3.6 4.8    105 99 98*   < > 105   < > 103 99   CO2 25 25 24 18*   < > 30   < > 19* 26   GLUCOSE 64* 174* 273* 415*   < > 139*   < > 108* 368*   BUN 33* 33* 24* 19   < > 14   < > 13 22*   CREATININE 1.5* 2.0* 2.3* 1.7*   < > 1.1   < > 0.9 1.1   LABGLOM 39* 27* 23* 33*   < > 56*   < > >60 56*   CALCIUM 8.1* 8.2* 8.3 8.8   < > 9.0   < > 8.5 8.9   PROT  --   --   --   --   --   --   --  7.1 7.0   LABALBU  --   --   --   --   --  2.9*  --  3.3* 3.6   AGRATIO  --   --   --   --   --   --   --  0.9*  --    BILITOT  --   --   --   --   --   --   --  0.3 <0.2   ALKPHOS  --   --   --   --   --   --   --  162* 155*   ALT  --   --   --   --   --   --   --  40 25   AST  --   --   --   --   --   --   --  39* 21   MG  --  1.60*  --   --   --   --   --   --   --     < > = values in this interval not displayed. Lab Results   Component Value Date    CALCIUM 8.1 (L) 03/10/2023    PHOS 3.9 03/09/2023       LDH:No results for input(s): LDH in the last 720 hours. Radiology Review:  XR CHEST PORTABLE  Narrative: EXAMINATION:  ONE XRAY VIEW OF THE CHEST    3/4/2023 10:40 am    COMPARISON:  2/10/2023    HISTORY:  ORDERING SYSTEM PROVIDED HISTORY: shortness of breath  TECHNOLOGIST PROVIDED HISTORY:  Reason for exam:->shortness of breath  Reason for Exam: SOB    FINDINGS:  The cardiac silhouette is mildly enlarged. There is no pneumothorax, edema  or focal consolidation. Clips are noted in the upper abdomen. There is levocurvature of the upper  thoracic spine. Impression: No evidence of edema or pneumonia. Mild cardiac silhouette enlargement. Problem List  Patient Active Problem List   Diagnosis    Acute hyperglycemia    Hypertension, uncontrolled    Bilateral malignant neoplasm of breast in female ([address]    Microalbuminuria    Obesity (BMI 30-39. 9)    Slurred speech    Hx of ischemic CVA    Brain metastases ([facility]    Carotid stenosis, bilateral:<50%:per US 7/2016 SHARRON (acute kidney injury) ([address]    Lactic acidosis    Frequent falls    Depression/anxiety    Abnormal brain MRI    Acute bilateral low back pain without sciatica    Closed fracture of right ankle, with routine healing, subsequent encounter    Stage 3a chronic kidney disease ([address]    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 ([address]    Dyslipidemia associated with type 2 diabetes mellitus ([address]    Bipolar disorder ([address]    Cardiomegaly    Cardiomyopathy ([address]    Carpal tunnel syndrome    Chronic systolic heart failure (HCC)    Diffuse cystic mastopathy    Edema    Gallstone pancreatitis    Gout    History of breast cancer    History of renal cell carcinoma    Cardiomyopathy in other diseases classified elsewhere    Mononeuritis    Myalgia and myositis    Nonspecific abnormal electrocardiogram (ECG) (EKG)    Patient in clinical research study    Peroneal muscular atrophy    Scoliosis (and kyphoscoliosis), idiopathic    SOBOE (shortness of breath on exertion)    Syncope and collapse    Chronic pain disorder    DDD (degenerative disc disease), lumbar    Diabetic polyneuropathy associated with type 2 diabetes mellitus ([address]    Other secondary scoliosis, lumbosacral region    Thoracic spondylosis without myelopathy    Morbid obesity due to excess calories (HCC)    Age-related nuclear cataract of both eyes    Hypermetropia, bilateral    Hypertensive retinopathy, bilateral    Vitreous degeneration, bilateral    Acute bilateral low back pain with left-sided sciatica    History of CVA (cerebrovascular accident) without residual deficits    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 stage 3 chronic kidney disease (HCC)    S/P mastectomy, right    Acute cystitis without hematuria    Hypomagnesemia    Chest pain    CAD S/P percutaneous coronary angioplasty    Hyperglycemia due to type 2 diabetes mellitus ([address]    Hx of heart artery stent    Nuclear senile cataract Unintentional weight loss    Chronic anemia    Facial abscess    Asymptomatic bacteriuria    Acute encephalopathy    Tobacco use disorder    Severe malnutrition (HCC)    Acute right eye pain    Suspected condition    Bipolar affective disorder, currently depressed, moderate (HCC)    Seasonal allergies    Symptomatic bradycardia    Dyspnea    Diffuse pain    Hypoglycemia    Screening for colon cancer       ASSESSMENT AND PLAN:    Colon cancer:  -Invasive adenocarcinoma  -T3, N0, M0  -No lymphatic or vascular invasion  -No perineural invasion  -Grade 2 or moderately differentiated  -No evidence of metastatic disease  -I would not offer her adjuvant chemotherapy based on the risk and benefit.   This also takes into consideration her mental status.  -Pre-op CEA 21.9     Wilms tumor:  -Surgery at age 4  -Pathology not available     Breast carcinoma:  -Diagnosis 9/24/2014  -Right breast  -T1c, N1A, M0, grade 1  -ER positive, NJ positive and HER2/edward negative  -Oncotype Dx of 8  -Patient stopped letrozole prematurely due to hot flashes     Dementia:  -Mild per granddaughter  -She does know where she is today     Bipolar disorder:  -Stable     Type 2 diabetes  -Managed by the hospitalist    Genetics:  -Now that she has had 3 cancers she will be referred to genetics for an evaluation          ONCOLOGIC DISPOSITION:      Daisy Amador MD  Please contact through [facility]

## 2023-03-10 NOTE — PROGRESS NOTES
Comprehensive Nutrition Assessment    Type and Reason for Visit:  Reassess    Nutrition Recommendations/Plan:   Continue clear liquid diet, ADAT per general surgery   RD to add ensure clear BID  If unable to advance diet in 24-48 hours, recommend PICC placement and TPN initiation. Parenteral nutrition recommendations:   Recommend check TG, Mg, Phos, CMP now if not done in last 24 hours. Bag 1: Clinimix 5/20 starting at 30 mL/hr  Physician/LIP to monitor closely and correct lytes (Phos,Mg,K+) d/t risk of refeeding syndrome  Bag 2: As long as electrolytes WNL, advance Clinimix 5/20 to rate of 45 mL/hr  Recommend 250 mL 20% lipids 65 times per week  Recommend FSBS, monitor glucose, need for insulin  Pharmacy to adjust MVI and Trace Elements as needed   When PN to be discontinued, cut rate by 50% and let current bag run out. Malnutrition Assessment:  Malnutrition Status: At risk for malnutrition (Comment) (03/02/23 1441)    Context:  Acute Illness     Findings of the 6 clinical characteristics of malnutrition:  Energy Intake:  Mild decrease in energy intake (Comment)  Muscle Mass Loss: Moderate muscle mass loss Clavicles (pectoralis & deltoids), Temples (temporalis)    Nutrition Assessment:    Follow up: Continues w/ with ngt to suction and plan to start clamping ngt. Pt NPO since 3/7, advanced to clear liquid diet today. Will add ensure clear. Recommend advancing diet per general surgery. If unable to advance diet in 24-48 hours, recommend PICC placement and TPN initiation. Recommendations included. Will continue to monitor. Nutrition Related Findings:    BG  x 24 hours. Labs reviewed. + 3 BM today. NGT to suction, +1.3 L of gastric output yesterday on NG tube Wound Type: Surgical Incision       Current Nutrition Intake & Therapies:    Average Meal Intake: Unable to assess  Average Supplements Intake: None Ordered  ADULT DIET;  Clear Liquid  Current Parenteral Nutrition Orders:  Type and Formula: Premix [facility]   Lipids: 250ml, Two times weekly  Duration: Continuous  Goal PN Orders Provides: Clinimix 5/20 at goal rate of 65 ml/hr to provide 1560 ml TV, 1373 kcals, 78 g protein and 312 g dextrose. + 250 bags of 20% lipids twice weekly to provide an additional 143 kcals from lipids. GIR= 3.9    Anthropometric Measures:  Height: 5' 3\" (160 cm)  Ideal Body Weight (IBW): 115 lbs (52 kg)       Current Body Weight: 121 lb (54.9 kg), 105.2 % IBW. Weight Source: Standing Scale  Current BMI (kg/m2): 21.4                       BMI Categories: Normal Weight (BMI 18.5-24. 9)    Estimated Daily Nutrient Needs:  Energy Requirements Based On: Kcal/kg (25-30 kcals/kg)  Weight Used for Energy Requirements: Current (55 kg)  Energy (kcal/day): 4968-4673  Weight Used for Protein Requirements: Current (1-1.2 g/kg)  Protein (g/day): 55-66 g  Method Used for Fluid Requirements: 1 ml/kcal    Nutrition Diagnosis:   Inadequate oral intake related to inadequate protein-energy intake, altered GI function as evidenced by poor intake prior to admission, moderate muscle loss, NPO or clear liquid status due to medical condition    Nutrition Interventions:   Food and/or Nutrient Delivery: Continue Current Diet, Start Oral Nutrition Supplement  Nutrition Education/Counseling: No recommendation at this time  Coordination of Nutrition Care: Continue to monitor while inpatient       Goals:  Previous Goal Met: Progress towards Goal(s) Declining  Goals: PO intake 50% or greater, prior to discharge  Specify Other Goals: Initiate most appropriate form of nutrition per General Surgery    Nutrition Monitoring and Evaluation:   Behavioral-Environmental Outcomes: None Identified  Food/Nutrient Intake Outcomes: Diet Advancement/Tolerance, Food and Nutrient Intake, Supplement Intake  Physical Signs/Symptoms Outcomes: Weight, Nutrition Focused Physical Findings, Biochemical Data, GI Status    Discharge Planning:     Too soon to determine     Matthew Massey MS, MIGUEL DOVER  Contact: Office: [phone]; [address] [place] [address] Road: [phone]

## 2023-03-10 NOTE — CARE COORDINATION
Hospital day 12/POD 3: Patient on C3 re hypoglycemia care managed by IM, General Surgery, GI, and Nephrology. Patient from home alone, 3 flight of steps to enter, needing much assistance from direct care givers, would benefit from re ordering PT/OT when medically appropriate. Placed on 02, new need. Patient with NGT removed per RN. Patient with petersen I/O Patient with HEATHER drain. Patient on IV ATB and Fluids. SW following. IBETH Hope  1320: Spoke with Patient edu on current need and request PT/OT eval is open to dc to SNF if recs, hopeful can dc home.  Referral sent to [facility] per request.IBETH Wu  21 [phone]: Spoke with [facility] able to accept, pending INS approval.IBETH Wu

## 2023-03-10 NOTE — PROGRESS NOTES
Head to toe completed and documented. Pt alert and oriented. Drowsy. SpO2 was in high 80s on RA, Pt placed on oxygen. Pt was able to stand and pivot to bedside commode. Pt was unable to stay awake while on commode, kept leaning back, struggling to stay away while writer was changing bedding. Second nurse was required to finish personal care, due to safety concerns. Pt in bed at this time. Bed in lowest position. Pain goal established with pt, pt at stated pain goal. Denies needs. Call bell in reach.

## 2023-03-11 ENCOUNTER — APPOINTMENT (OUTPATIENT)
Dept: GENERAL RADIOLOGY | Age: 63
DRG: 329 | End: 2023-03-11
Payer: COMMERCIAL

## 2023-03-11 LAB
ANION GAP SERPL CALCULATED.3IONS-SCNC: 9 MMOL/L (ref 3–16)
BASOPHILS ABSOLUTE: 0.1 K/UL (ref 0–0.2)
BASOPHILS RELATIVE PERCENT: 1.5 %
BILIRUBIN URINE: NEGATIVE
BLOOD, URINE: ABNORMAL
BUN BLDV-MCNC: 30 MG/DL (ref 7–20)
CALCIUM SERPL-MCNC: 8.1 MG/DL (ref 8.3–10.6)
CHLORIDE BLD-SCNC: 106 MMOL/L (ref 99–110)
CLARITY: CLEAR
CO2: 24 MMOL/L (ref 21–32)
COLOR: YELLOW
CREAT SERPL-MCNC: 1.4 MG/DL (ref 0.6–1.2)
EOSINOPHILS ABSOLUTE: 0 K/UL (ref 0–0.6)
EOSINOPHILS RELATIVE PERCENT: 0.2 %
EPITHELIAL CELLS, UA: NORMAL /HPF (ref 0–5)
GFR SERPL CREATININE-BSD FRML MDRD: 42 ML/MIN/{1.73_M2}
GLUCOSE BLD-MCNC: 252 MG/DL (ref 70–99)
GLUCOSE BLD-MCNC: 273 MG/DL (ref 70–99)
GLUCOSE BLD-MCNC: 281 MG/DL (ref 70–99)
GLUCOSE BLD-MCNC: 281 MG/DL (ref 70–99)
GLUCOSE BLD-MCNC: 307 MG/DL (ref 70–99)
GLUCOSE BLD-MCNC: 340 MG/DL (ref 70–99)
GLUCOSE BLD-MCNC: 403 MG/DL (ref 70–99)
GLUCOSE URINE: 250 MG/DL
HCT VFR BLD CALC: 23.8 % (ref 36–48)
HEMOGLOBIN: 7.1 G/DL (ref 12–16)
KETONES, URINE: NEGATIVE MG/DL
LACTIC ACID: 1.6 MMOL/L (ref 0.4–2)
LEUKOCYTE ESTERASE, URINE: ABNORMAL
LYMPHOCYTES ABSOLUTE: 1.1 K/UL (ref 1–5.1)
LYMPHOCYTES RELATIVE PERCENT: 16 %
MAGNESIUM: 2.5 MG/DL (ref 1.8–2.4)
MCH RBC QN AUTO: 27.5 PG (ref 26–34)
MCHC RBC AUTO-ENTMCNC: 29.9 G/DL (ref 31–36)
MCV RBC AUTO: 92.2 FL (ref 80–100)
MICROSCOPIC EXAMINATION: YES
MONOCYTES ABSOLUTE: 0.7 K/UL (ref 0–1.3)
MONOCYTES RELATIVE PERCENT: 9.6 %
NEUTROPHILS ABSOLUTE: 4.9 K/UL (ref 1.7–7.7)
NEUTROPHILS RELATIVE PERCENT: 72.7 %
NITRITE, URINE: NEGATIVE
PDW BLD-RTO: 15.3 % (ref 12.4–15.4)
PERFORMED ON: ABNORMAL
PH UA: 6 (ref 5–8)
PHOSPHORUS: 1.5 MG/DL (ref 2.5–4.9)
PLATELET # BLD: 196 K/UL (ref 135–450)
PMV BLD AUTO: 9.9 FL (ref 5–10.5)
POTASSIUM REFLEX MAGNESIUM: 3.7 MMOL/L (ref 3.5–5.1)
PROCALCITONIN: 20.02 NG/ML (ref 0–0.15)
PROTEIN UA: 30 MG/DL
RBC # BLD: 2.58 M/UL (ref 4–5.2)
RBC UA: NORMAL /HPF (ref 0–4)
SODIUM BLD-SCNC: 139 MMOL/L (ref 136–145)
SPECIFIC GRAVITY UA: 1.01 (ref 1–1.03)
URINE REFLEX TO CULTURE: ABNORMAL
URINE TYPE: ABNORMAL
UROBILINOGEN, URINE: 0.2 E.U./DL
WBC # BLD: 6.8 K/UL (ref 4–11)
WBC UA: NORMAL /HPF (ref 0–5)

## 2023-03-11 PROCEDURE — 6370000000 HC RX 637 (ALT 250 FOR IP): Performed by: SURGERY

## 2023-03-11 PROCEDURE — 36415 COLL VENOUS BLD VENIPUNCTURE: CPT

## 2023-03-11 PROCEDURE — 2580000003 HC RX 258: Performed by: SURGERY

## 2023-03-11 PROCEDURE — 97164 PT RE-EVAL EST PLAN CARE: CPT

## 2023-03-11 PROCEDURE — 83605 ASSAY OF LACTIC ACID: CPT

## 2023-03-11 PROCEDURE — 97535 SELF CARE MNGMENT TRAINING: CPT

## 2023-03-11 PROCEDURE — 97530 THERAPEUTIC ACTIVITIES: CPT

## 2023-03-11 PROCEDURE — 80048 BASIC METABOLIC PNL TOTAL CA: CPT

## 2023-03-11 PROCEDURE — 2500000003 HC RX 250 WO HCPCS: Performed by: SURGERY

## 2023-03-11 PROCEDURE — 6360000002 HC RX W HCPCS: Performed by: SURGERY

## 2023-03-11 PROCEDURE — 6370000000 HC RX 637 (ALT 250 FOR IP): Performed by: NURSE PRACTITIONER

## 2023-03-11 PROCEDURE — 84100 ASSAY OF PHOSPHORUS: CPT

## 2023-03-11 PROCEDURE — 6370000000 HC RX 637 (ALT 250 FOR IP): Performed by: INTERNAL MEDICINE

## 2023-03-11 PROCEDURE — 84145 PROCALCITONIN (PCT): CPT

## 2023-03-11 PROCEDURE — 1200000000 HC SEMI PRIVATE

## 2023-03-11 PROCEDURE — 6360000002 HC RX W HCPCS: Performed by: INTERNAL MEDICINE

## 2023-03-11 PROCEDURE — 2700000000 HC OXYGEN THERAPY PER DAY

## 2023-03-11 PROCEDURE — 71046 X-RAY EXAM CHEST 2 VIEWS: CPT

## 2023-03-11 PROCEDURE — 97168 OT RE-EVAL EST PLAN CARE: CPT

## 2023-03-11 PROCEDURE — 94761 N-INVAS EAR/PLS OXIMETRY MLT: CPT

## 2023-03-11 PROCEDURE — 97116 GAIT TRAINING THERAPY: CPT

## 2023-03-11 PROCEDURE — 81001 URINALYSIS AUTO W/SCOPE: CPT

## 2023-03-11 PROCEDURE — 83735 ASSAY OF MAGNESIUM: CPT

## 2023-03-11 PROCEDURE — 85025 COMPLETE CBC W/AUTO DIFF WBC: CPT

## 2023-03-11 RX ADMIN — METRONIDAZOLE 500 MG: 500 INJECTION, SOLUTION INTRAVENOUS at 17:40

## 2023-03-11 RX ADMIN — PANTOPRAZOLE SODIUM 40 MG: 40 TABLET, DELAYED RELEASE ORAL at 08:42

## 2023-03-11 RX ADMIN — ACETAMINOPHEN 325MG 650 MG: 325 TABLET ORAL at 06:36

## 2023-03-11 RX ADMIN — INSULIN LISPRO 4 UNITS: 100 INJECTION, SOLUTION INTRAVENOUS; SUBCUTANEOUS at 21:06

## 2023-03-11 RX ADMIN — METRONIDAZOLE 500 MG: 500 INJECTION, SOLUTION INTRAVENOUS at 02:01

## 2023-03-11 RX ADMIN — Medication 2000 MG: at 21:08

## 2023-03-11 RX ADMIN — ACETAMINOPHEN 325MG 650 MG: 325 TABLET ORAL at 21:04

## 2023-03-11 RX ADMIN — SODIUM CHLORIDE, PRESERVATIVE FREE 10 ML: 5 INJECTION INTRAVENOUS at 08:43

## 2023-03-11 RX ADMIN — ASPIRIN 81 MG: 81 TABLET, COATED ORAL at 08:43

## 2023-03-11 RX ADMIN — INSULIN LISPRO 4 UNITS: 100 INJECTION, SOLUTION INTRAVENOUS; SUBCUTANEOUS at 16:51

## 2023-03-11 RX ADMIN — ENOXAPARIN SODIUM 40 MG: 100 INJECTION SUBCUTANEOUS at 08:42

## 2023-03-11 RX ADMIN — POTASSIUM PHOSPHATE, MONOBASIC POTASSIUM PHOSPHATE, DIBASIC 30 MMOL: 224; 236 INJECTION, SOLUTION, CONCENTRATE INTRAVENOUS at 16:58

## 2023-03-11 RX ADMIN — SODIUM CHLORIDE, PRESERVATIVE FREE 10 ML: 5 INJECTION INTRAVENOUS at 21:00

## 2023-03-11 RX ADMIN — ATORVASTATIN CALCIUM 20 MG: 10 TABLET, FILM COATED ORAL at 08:43

## 2023-03-11 RX ADMIN — Medication 2000 MG: at 08:42

## 2023-03-11 RX ADMIN — INSULIN GLARGINE 10 UNITS: 100 INJECTION, SOLUTION SUBCUTANEOUS at 08:43

## 2023-03-11 RX ADMIN — GABAPENTIN 600 MG: 300 CAPSULE ORAL at 08:43

## 2023-03-11 RX ADMIN — INSULIN LISPRO 8 UNITS: 100 INJECTION, SOLUTION INTRAVENOUS; SUBCUTANEOUS at 11:23

## 2023-03-11 RX ADMIN — Medication 1 TABLET: at 08:43

## 2023-03-11 RX ADMIN — INSULIN LISPRO 4 UNITS: 100 INJECTION, SOLUTION INTRAVENOUS; SUBCUTANEOUS at 08:44

## 2023-03-11 RX ADMIN — METRONIDAZOLE 500 MG: 500 INJECTION, SOLUTION INTRAVENOUS at 09:52

## 2023-03-11 RX ADMIN — TRAMADOL HYDROCHLORIDE 50 MG: 50 TABLET, COATED ORAL at 16:57

## 2023-03-11 ASSESSMENT — PAIN DESCRIPTION - DESCRIPTORS
DESCRIPTORS: ACHING
DESCRIPTORS: ACHING

## 2023-03-11 ASSESSMENT — PAIN DESCRIPTION - FREQUENCY: FREQUENCY: INTERMITTENT

## 2023-03-11 ASSESSMENT — PAIN DESCRIPTION - LOCATION
LOCATION: ABDOMEN
LOCATION: ABDOMEN

## 2023-03-11 ASSESSMENT — PAIN SCALES - GENERAL
PAINLEVEL_OUTOF10: 7
PAINLEVEL_OUTOF10: 8
PAINLEVEL_OUTOF10: 5

## 2023-03-11 ASSESSMENT — PAIN DESCRIPTION - PAIN TYPE: TYPE: SURGICAL PAIN

## 2023-03-11 ASSESSMENT — PAIN DESCRIPTION - ORIENTATION
ORIENTATION: MID
ORIENTATION: MID

## 2023-03-11 NOTE — PLAN OF CARE
Problem: Pain  Goal: Verbalizes/displays adequate comfort level or baseline comfort level  3/11/2023 1005 by Leland Singleton RN  Outcome: Progressing  Flowsheets (Taken 3/10/2023 0306 by Nicole Craig)  Verbalizes/displays adequate comfort level or baseline comfort level:   Encourage patient to monitor pain and request assistance   Assess pain using appropriate pain scale   Implement non-pharmacological measures as appropriate and evaluate response   Administer analgesics based on type and severity of pain and evaluate response   Consider cultural and social influences on pain and pain management   Notify Licensed Independent Practitioner if interventions unsuccessful or patient reports new pain     Problem: Gastrointestinal - Adult  Goal: Minimal or absence of nausea and vomiting  Outcome: Progressing  Flowsheets (Taken 3/11/2023 1005)  Minimal or absence of nausea and vomiting: Administer IV fluids as ordered to ensure adequate hydration     Problem: Safety - Adult  Goal: Free from fall injury  Outcome: Progressing  Flowsheets (Taken 3/11/2023 1005)  Free From Fall Injury: Based on caregiver fall risk screen, instruct family/caregiver to ask for assistance with transferring infant if caregiver noted to have fall risk factors

## 2023-03-11 NOTE — PROGRESS NOTES
Occupational Therapy  Facility/Department: [facility] C3 TELE/MED SURG/ONC  Occupational Therapy Re-evaluation and Treatment Note    Name: Conrad Landeros  : 1960  MRN: [id number]  Date of Service: 3/11/2023    Discharge Recommendations:  [address]      Patient Diagnosis(es): The primary encounter diagnosis was Hypoglycemia. Diagnoses of Confusion, Screening for colon cancer, and Malignant neoplasm of hepatic flexure ([address] were also pertinent to this visit. Past Medical History:  has a past medical history of Abnormal brain MRI, Acute bilateral low back pain without sciatica, SHARRON (acute kidney injury) ([address] Arthritis, Bipolar disorder ([address] CAD (coronary artery disease), Cancer ([address] Carotid stenosis, bilateral:<50%:per [place] 2016, Carpal tunnel syndrome, Cervical cancer screening, Coronary artery disease of native artery of native heart with stable angina pectoris ([address] DDD (degenerative disc disease), lumbar, Depression, Depression/anxiety, Depression/anxiety, Diabetes mellitus ([address] Gout, History of mammogram, History of therapeutic radiation, Hyperlipidemia, Hypertension, 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 Past Surgical History:  has a past surgical history that includes Kidney removal; Hysterectomy; Breast lumpectomy (); Tubal ligation; other surgical history (Right); Cardiac catheterization (2020); Upper gastrointestinal endoscopy (N/A, 2021); Colonoscopy (N/A, 2021); CT BIOPSY BONE MARROW (2/3/2021); Temporal Artery Biopsy (Right, 2021); Upper gastrointestinal endoscopy (N/A, 12/15/2022);  Colonoscopy (N/A, 2/27/2023); and hemicolectomy (N/A, 3/7/2023). Assessment   Performance deficits / Impairments: Decreased functional mobility ; Decreased ADL status; Decreased safe awareness;Decreased cognition;Decreased balance;Decreased endurance  Assessment: OT re-eval and tx completed today. She is s/p open R colectomy d/t adenocarcinoma. Pt lives alone and is independent at baseline. She had recently been d/c'd from SNF. During OT session, pt required min A for standing balance, bathroom mobility and toilet transfer with RW. Pt's performance is significantly affected by low endurance. She often keeps eyes closed during activity. She reports feeling sleepy since admission. Extensive assist needed for LE ADLs. Recommend SNF for skilled OT services. Cont OT in acute care. Prognosis: Good  Decision Making: Medium Complexity  REQUIRES OT FOLLOW-UP: Yes  Activity Tolerance  Activity Tolerance: Patient limited by fatigue        Plan   Occupational Therapy Plan  Times Per Week: 3-5x     Restrictions  Restrictions/Precautions  Restrictions/Precautions: Fall Risk, General Precautions  Position Activity Restriction  Other position/activity restrictions: telemetry, petersen, IV, .5LO2, closed suction drain at abdomen    Subjective   General  Chart Reviewed: Yes  Patient assessed for rehabilitation services?: Yes  Additional Pertinent Hx: hx R breast ca  Family / Caregiver Present: No  Referring Practitioner: MARCIANO Cassidy  Diagnosis: adenocarcinoma s/p open R colectomy 3/7/23  Subjective  Subjective: Pt resting in bed, responsive to questions but did not keep eyes opened consistently.  No pain at rest  General Comment  Comments: RN approved therapy     Social/Functional History  Social/Functional History  Lives With: Alone  Type of Home: Apartment  Home Layout: One level (pt apartment the 3rd floor)  Home Access: Stairs to enter with rails  Entrance Stairs - Number of Steps: 12  Entrance Stairs - Rails: Right  Bathroom Shower/Tub: Tub/Shower unit, Shower chair with back  [employer]: Standard  Home Equipment: Melvia Crofts, rolling, Cane  Has the patient had two or more falls in the past year or any fall with injury in the past year?: Yes  Receives Help From: Friend(s)  ADL Assistance: [address]: Independent  Homemaking Responsibilities: Yes  Ambulation Assistance: Independent  Transfer Assistance: Needs assistance (\"friends stays with me when I get in/out of tub\")  Active : No  Patient's  Info: Friends  Leisure & Hobbies: go to [employer]  Additional Comments: Pt reports has friends nearby to assist when needed     Objective   Heart Rate: 77  Heart Rate Source: Monitor  BP: (!) 130/56  BP Location: Left upper arm  BP Method: Automatic  Patient Position: Semi fowlers  MAP (Calculated): 81  Resp: 16  SpO2: 95 %  O2 Device: Nasal cannula  Comment: on 0.5 L. post activity with patient back in bed: 122/60 84 BPM 92% on room air. 0.5 L of O2 reapplied and she was 93% on 0.5 L. Observation/Palpation  Posture: Fair  Safety Devices  Type of Devices: Gait belt;Nurse notified; Left in bed;Bed alarm in place;Call light within reach; Patient at risk for falls; All fall risk precautions in place    Toilet Transfers  Toilet - Technique: Ambulating (RW)  Equipment Used: Grab bars  Toilet Transfer: Minimal assistance  AROM: Within functional limits  Strength: Generally decreased, functional  Coordination: Within functional limits  Tone: Normal  Sensation: Intact  ADL  Grooming: Stand by assistance;Setup; Increased time to complete  Grooming Skilled Clinical Factors: Wash hands while seated  UE Dressing: Moderate assistance  UE Dressing Skilled Clinical Factors: gown  LE Dressing: Maximum assistance  Toileting: Moderate assistance  Toileting Skilled Clinical Factors: Mod A to manage brief. Pt able to perform hygiene after BM while seated on toilet. Additional Comments: Pt reported fatigue after ADL tasks.  Returned to bed to rest.        Bed mobility  Supine to Sit: Minimal assistance (HOB elevated. Pt instructed to log roll.)  Sit to Supine: Minimal assistance  Transfers  Stand Pivot Transfers: Minimal assistance (RW)  Sit to stand: Minimal assistance  Stand to sit: Minimal assistance  Transfer Comments: Min A for BR mobility with RW, vc's for safety. Demo's low standing tolerance  Vision  Vision: Impaired  Vision Exceptions: Cataracts  Hearing  Hearing: Within functional limits  Cognition  Overall Cognitive Status: Exceptions  Arousal/Alertness: Delayed responses to stimuli  Following Commands: Follows one step commands with increased time; Follows one step commands with repetition  Attention Span: Attends with cues to redirect  Memory: Decreased recall of recent events  Safety Judgement: Decreased awareness of need for safety  Problem Solving: Decreased awareness of errors  Insights: Decreased awareness of deficits  Initiation: Requires cues for some  Sequencing: Requires cues for some  Cognition Comment: patient was very lethargic during session. patient required cueing in order to keep her eyes open and focus on therapy task at hand. Orientation  Overall Orientation Status: Within Functional Limits  Orientation Level: Oriented to place;Oriented to situation;Oriented to person;Oriented to time (vc's for year \"4196\")       Education Given To: Patient  Education Provided: Role of Therapy;Plan of Care;Transfer Training;Equipment;ADL Adaptive Strategies; Energy Conservation; Fall Prevention Strategies  Education Method: Verbal;Demonstration  Barriers to Learning: Cognition  Education Outcome: Verbalized understanding;Continued education needed   Disease Specific Education: Pt educated on importance of OOB mobility, prevention of complications of bedrest, and general safety during hospitalization.  Pt verbalized understanding     AM-PAC Score      AM-PAC Inpatient Daily Activity Raw Score: 15 (03/11/23 7664)  AM-PAC Inpatient ADL T-Scale Score : 34.69 (03/11/23 1354)  ADL Inpatient CMS 0-100% Score: 56.46 (03/11/23 1354)  ADL Inpatient CMS G-Code Modifier : CK (03/11/23 1354)  Goals  Short Term Goals  Time Frame for Short Term Goals: 1 week (3/18) unless noted  Short Term Goal 1: Perform functional transfers with SBA and RW  Short Term Goal 2: Perform toileting with SBA by 3/16  Short Term Goal 3: Stand at sink for 1-2 ADL tasks with SBA  Patient Goals   Patient goals : Pt did not provide     Therapy Time   Individual Concurrent Group Co-treatment   Time In 0902         Time Out 0935         Minutes 33         Timed Code Treatment Minutes: 23 Minutes (10 min eval)   If pt is discharged prior to next OT session, this note will serve as the discharge summary.   Betsy Soulier OT

## 2023-03-11 NOTE — PROGRESS NOTES
Physical Therapy  Facility/Department: [place] [address] TELE/MED SURG/ONC  Physical Therapy Re-Evaluation and Treatment    Name: Michael Mejia  : 1960  MRN: [id number]  Date of Service: 3/11/2023    Discharge Recommendations:  Subacute/Skilled Nursing Facility   PT Equipment Recommendations  Equipment Needed: No  Other: defer to patient's facility      Patient Diagnosis(es): The primary encounter diagnosis was Hypoglycemia. Diagnoses of Confusion, Screening for colon cancer, and Malignant neoplasm of hepatic flexure ([address] were also pertinent to this visit. Past Medical History:  has a past medical history of Abnormal brain MRI, Acute bilateral low back pain without sciatica, SHARRON (acute kidney injury) ([address] Arthritis, Bipolar disorder ([address] CAD (coronary artery disease), Cancer ([address] Carotid stenosis, bilateral:<50%:per [place] 2016, Carpal tunnel syndrome, Cervical cancer screening, Coronary artery disease of native artery of native heart with stable angina pectoris ([address] DDD (degenerative disc disease), lumbar, Depression, Depression/anxiety, Depression/anxiety, Diabetes mellitus ([address] Gout, History of mammogram, History of therapeutic radiation, Hyperlipidemia, Hypertension, 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 Past Surgical History:  has a past surgical history that includes Kidney removal; Hysterectomy; Breast lumpectomy (); Tubal ligation; other surgical history (Right); Cardiac catheterization (2020); Upper gastrointestinal endoscopy (N/A, 2021); Colonoscopy (N/A, 2021); CT BIOPSY BONE MARROW (2/3/2021);  Temporal Artery Biopsy (Right, 8/9/2021); Upper gastrointestinal endoscopy (N/A, 12/15/2022); Colonoscopy (N/A, 2/27/2023); and hemicolectomy (N/A, 3/7/2023). If pt is unable to be seen after this session, please let this note serve as discharge summary. Please see case management note for discharge disposition. Thank you. Barriers to home discharge:   [x] Steps to access home entry or bed/bath: full flight of steps to enter her apartment   [x] Reported available assist at home upon discharge limited: patient lives alone  Assessment   Body Structures, Functions, Activity Limitations Requiring Skilled Therapeutic Intervention: Decreased functional mobility ; Decreased strength;Decreased safe awareness;Decreased balance; Increased pain;Decreased ADL status; Decreased endurance;Decreased cognition;Decreased high-level IADLs  Assessment: Patient last seen for PT treatment session on 3/1/23. On 3/1/23 the patient was ambulating with therapy with SBA level of assistance. Since then the patient received right colectomy on 3/7/23. Physical therapy re-evaluation was performed on 3/11/23 due to patient's change in medical status. Patient reports that she is normally independent with mobility. Today she required minimum assistance with bed mobility, min assist for transfers and min assist to ambulate 10 feet x 2 with a rolling walker. Patient was extremely lethargic during the session and needed frequent cueing for safety. RN aware. Patient is functioning below baseline and would benefit from skilled therapy to address current deficits mentioned above. Due to patient's significant change in her functional status, PT now recommends that this patient receive skilled PT in the SNF setting, when medically stable, in order to address her therapy deficits and to help her maximize her safety and independence with all functional mobility. PT to continue to follow.   Treatment Diagnosis: Decreased independence with functional mobility  Specific Instructions for Next Treatment: progress mobility as tolerated  Therapy Prognosis: Fair  Decision Making: Medium Complexity  Barriers to Learning: cognition  Requires PT Follow-Up: Yes  Activity Tolerance  Activity Tolerance: Patient limited by fatigue;Patient limited by endurance;Treatment limited secondary to decreased cognition  Activity Tolerance Comments: post activity with patient back in bed: 122/60 84 BPM 92% on room air. 0.5 L of O2 reapplied and she was 93% on 0.5 L. Plan   Physcial Therapy Plan  General Plan: 3-5 times per week  Therapy Duration: 1 Week (3/18/23)  Specific Instructions for Next Treatment: progress mobility as tolerated  Current Treatment Recommendations: Strengthening, Balance training, Functional mobility training, Transfer training, Endurance training, Gait training, Stair training, Home exercise program, Safety education & training, Therapeutic activities, Patient/Caregiver education & training, Equipment evaluation, education, & procurement, ADL/Self-care training  Safety Devices  Type of Devices: Gait belt, Nurse notified, Left in bed, Bed alarm in place, Call light within reach, Patient at risk for falls, All fall risk precautions in place  Restraints  Restraints Initially in Place: No     Restrictions  Restrictions/Precautions  Restrictions/Precautions: Fall Risk, General Precautions  Position Activity Restriction  Other position/activity restrictions: telemetry, petersen, IV, .5LO2, closed suction drain at abdomen     Subjective   Pain: Patient said she did not have any pain. General  Chart Reviewed: Yes  Patient assessed for rehabilitation services?: Yes  Additional Pertinent Hx: Patient last seen for PT treatment session on 3/1/23. On 3/1/23 the patient was ambulating with therapy with SBA level of assistance. Since then the patient received right colectomy on 3/7/23. Physical therapy re-evaluation was performed due to patient's change in medical status.   Response To Previous Treatment: Patient reporting fatigue but able to participate.  Family / Caregiver Present: No  Referring Practitioner: SARTHAK Landrum CNP  Referral Date : 03/10/23  Follows Commands: Impaired  General Comment  Comments: RN cleared pt for PT eval. Supine in bed upon entry of therapy staff.  Subjective  Subjective: Patient agreed to participate.         Social/Functional History  Social/Functional History  Lives With: Alone  Type of Home: Apartment  Home Layout: One level (pt apartment the 3rd floor)  Home Access: Stairs to enter with rails  Entrance Stairs - Number of Steps: 12  Entrance Stairs - Rails: Right  Bathroom Shower/Tub: Tub/Shower unit, Shower chair with back  Bathroom Toilet: Standard  Home Equipment: Walker, rolling, Cane  Has the patient had two or more falls in the past year or any fall with injury in the past year?: Yes  Receives Help From: Friend(s)  ADL Assistance: Independent  Homemaking Assistance: Independent  Homemaking Responsibilities: Yes  Ambulation Assistance: Independent  Transfer Assistance: Needs assistance (\"friends stays with me when I get in/out of tub\")  Active : No  Patient's  Info: Friends  Leisure & Hobbies: go to [employer]  Additional Comments: Pt reports has friends nearby to assist when needed  Vision/Hearing  Vision  Vision: Impaired  Vision Exceptions: Cataracts  Hearing  Hearing: Within functional limits    Cognition   Cognition  Overall Cognitive Status: Exceptions  Arousal/Alertness: Delayed responses to stimuli  Following Commands: Follows one step commands with increased time;Follows one step commands with repetition  Attention Span: Attends with cues to redirect  Memory: Decreased recall of recent events  Safety Judgement: Decreased awareness of need for safety  Problem Solving: Decreased awareness of errors  Insights: Decreased awareness of deficits  Initiation: Requires cues for some  Sequencing: Requires cues for some  Cognition Comment: patient was very  lethargic during session. patient required cueing in order to keep her eyes open and focus on therapy task at hand. Objective   Heart Rate: 77  Heart Rate Source: Monitor  BP: (!) 130/56  BP Location: Left upper arm  BP Method: Automatic  Patient Position: Semi fowlers  MAP (Calculated): 81  Resp: 16  SpO2: 95 %  O2 Device: Nasal cannula  Comment: on 0.5 L. post activity with patient back in bed: 122/60 84 BPM 92% on room air. 0.5 L of O2 reapplied and she was 93% on 0.5 L. Observation/Palpation  Posture: Fair  Gross Assessment  AROM: Within functional limits  PROM: Within functional limits  Strength: Generally decreased, functional (grossly 4/5 bilaterally)                 Bed Mobility Training  Bed Mobility Training: Yes  Overall Level of Assistance: Minimum assistance  Interventions: Safety awareness training; Tactile cues; Verbal cues (cueing for log roll technique)  Rolling: Contact-guard assistance  Supine to Sit: Minimum assistance  Sit to Supine: Minimum assistance  Scooting: Contact-guard assistance (to scoot to edge of the bed)  Balance  Sitting: Impaired  Sitting - Static: Fair (occasional)  Sitting - Dynamic: Fair (occasional)  Standing: Impaired  Standing - Static: Fair  Standing - Dynamic: Poor  Transfer Training  Transfer Training: Yes  Overall Level of Assistance: Minimum assistance  Interventions: Safety awareness training; Tactile cues; Verbal cues  Sit to Stand: Minimum assistance  Stand to Sit: Minimum assistance  Bed to Chair: Minimum assistance  Gait Training  Gait Training: Yes  Gait  Overall Level of Assistance: Minimum assistance  Interventions: Safety awareness training; Tactile cues; Verbal cues  Base of Support: Center of gravity altered;Narrowed  Speed/Olga: Pace decreased (< 100 feet/min); Slow  Step Length: Right shortened;Left shortened  Gait Abnormalities: Shuffling gait; Decreased step clearance; Path deviations  Distance (ft): 10 Feet (10 feet x 2 with RW and min assist. No complaints of shortness of breath, chest pain or dizziness. 1 loss of balance, min assist to correct. min assist for RW placement.)  Assistive Device: Walker, rolling;Gait belt  Number of Stairs Trained:  (patient declined to attempt due to patient's fatigue, lethargy)              Balance  Posture: Fair  Sitting - Static: Fair  Sitting - Dynamic: Fair  Standing - Static: Poor;+  Standing - Dynamic: Poor;+  Comments: with rolling walker         AM-PAC Score     AM-PAC Inpatient Mobility without Stair Climbing Raw Score : 15 (03/11/23 1554)  AM-PAC Inpatient without Stair Climbing T-Scale Score : 43.03 (03/11/23 1554)  Mobility Inpatient CMS 0-100% Score: 47.43 (03/11/23 1554)  Mobility Inpatient without Stair CMS G-Code Modifier : CK (03/11/23 1554)       Goals  Short Term Goals  Time Frame for Short Term Goals: 7 days (3/7/23) PT re-evaluation completed on 3/11/23. Goals extended to 3/18/23 due to patient's change of medical status and recent surgery on 3/7/23  Short Term Goal 1: Pt will perform bed mobility with supervision-- 3/11 min assist  Short Term Goal 2: Pt will perform transfers with no AD and supervision-- 3/11 min assist  Short Term Goal 3: Pt will ambulate 200ft with no AD and SBA-- 3/11 10 feet x 2 with RW and min assist.  Short Term Goal 4: Pt will negotiate 12 steps with R handrail and SBA-- 3/11 patient declined to attempt  Short Term Goal 5: Pt will perform 12-15 reps BLE exercises by 3/3/23--3/11 patient declined to attempt  Patient Goals   Patient Goals : \"to go home\"       Education  Patient Education  Education Given To: Patient  Education Provided: Role of Therapy;Plan of Care;Transfer Training; Fall Prevention Strategies; Equipment  Education Provided Comments: Disease Specific Education: Patient educated on importance of out of bed mobility, prevention of complications of bedrest, and general safety (importance of using call bell) during hospitalization.   Education Method: Demonstration;Verbal  Barriers to Learning: Cognition  Education Outcome: Verbalized understanding; Unable to demonstrate understanding;Continued education needed      Therapy Time   Individual Concurrent Group Co-treatment   Time In 0901         Time Out 0934         Minutes 33         Timed Code Treatment Minutes: 23 Minutes (10 minute evaluation)       Madison Canales PT

## 2023-03-11 NOTE — PROGRESS NOTES
Paged MD about 403 BS. Insulin given per mar. MD wants BS rechecked in and hour. BS rechecked and is now 340. Made MD aware and reported that pt is more lethargic. Head CT suggested. Spoke with MD at [phone]. MD awaiting lab results. MD reported assessing pt, pt able to respond. Continue to monitor and replace f/c, obtain UA sample.

## 2023-03-11 NOTE — PROGRESS NOTES
Sent secure message to Dr Duane Ventura, \" Pt is flagging sepsis. Do you want Blood cultures, CBC, and Lactic? \" Farshad Chang [phone]. [facility] at [facility]. Awaiting orders.

## 2023-03-11 NOTE — PROGRESS NOTES
Interval History and plan:      Patient was seen and examined at bedside. She complains of abdominal pain, asking for more pain meds, but was sleeping when I entered the room    /56  On 1 L NC  Some mild edema. UO 1425 cc past day   + 8.1 L for admission     Na 139 k 3.7 cr 1.4 bicar 24   Bg 400  Phos 1.5         Plan:  BP acceptable  Cr trending down. Good UO   PO intake low, continue NS infusion  Replacing phos                   Assessment :     Acute Kidney Injury  Creatinine peaked to 2.3-2 on consult  Creatinine [id number]-3-7.3  She is known to have CKD 2 baseline-likely due to nephrectomy, recurrent SHARRON, diabetes  She has a history of Wilms tumor and nephrectomy  Had SHARRON on 2/21-seen by me again. Her protein creatinine ratio was normal  She also has diabetes mellitus on insulin-which is a risk factor for CKD         Hypertension   BP: (130-132)/(52-56)  Heart Rate:  [77-82]   BP goal inpatient 360-327 systolic inpatient  Blood pressure is relatively soft  She is also known to have atrial fibrillation        [facility] would like to thank Myranda Resendiz DO   for opportunity to serve this patient      Please call with questions at-   24 Hrs Answering service [phone] or  7 am- 5 pm via Perfect serve or cell phone  Asaf Alex MD          CC/reason for consult :     SHARRON     HPI :     Michael Mejia is a 60 y.o. female presented to   the hospital on 2/26/2023 with altered mental status. She was unresponsive on presentation. She was getting bowel preparation for colonoscopy and fell in the bathroom preceded by dizziness weakness and swelling. She also lost her consciousness. She was taking short acting insulin in addition to Lantus despite of being NPO. She is known to have diabetes on insulin. EMS was called. She was found to have blood sugar of 40 which was treated in route and she was brought to the emergency room.   She had a colonoscopy done inpatient and had a biopsy done which showed lesion in the hepatic flexure came back positive for colon cancer. She had a colon surgery done this hospitalization  Her course of hospitalization is complicated by SHARRON. ROS:         positives in bold   Constitutional:  fever, chills, weakness, weight change, fatigue  Skin:  rash, pruritus, hair loss, bruising, dry skin, petechiae  Head, Face, Neck   headaches, swelling,  cervical adenopathy  Respiratory: shortness of breath, cough, or wheezing  Cardiovascular: chest pain, palpitations, dizzy, edema  Gastrointestinal: nausea, vomiting, diarrhea, constipation,belly pain    Yellow skin, blood in stool  Musculoskeletal:  back pain, muscle weakness, gait problems,       joint pain or swelling. Genitourinary:  dysuria, poor urine flow, flank pain, blood in urine  Neurologic:  vertigo, TIA'S, syncope, seizures, focal weakness  Psychosocial:  insomnia, anxiety, or depression. Additional positive findings:                    All other remaining systems are negative or unable to obtain        PMH/PSH/SH/Family History:     Past Medical History:   Diagnosis Date    Abnormal brain MRI 7/20/2017    Partially empty sella and minimal chronic small vessel ischemic disease    Acute bilateral low back pain without sciatica 11/2/2016    SHARRON (acute kidney injury) ([facility] [address] 7/5/2017    Arthritis     back    Bipolar disorder ([facility] [address] 10/18/2008    CAD (coronary artery disease)     stent placed 6/8/20    Cancer ([facility] [address] 2015    bilateral breast:s/p lumpectomy/radiation:under care care of breast specialist:Dr. Boone     Carotid stenosis, bilateral:<50%:per US 7/2016 7/15/2016    Carpal tunnel syndrome 10/18/2008    Cervical cancer screening 2014    Nml per pt'.     Coronary artery disease of native artery of native heart with stable angina pectoris ([facility] [address] 6/9/2020    DDD (degenerative disc disease), lumbar 7/18/2018    Depression     under care of pschiatrist:Dr. Sg Corado    Depression/anxiety 7/5/2017    Depression/anxiety Diabetes mellitus ([facility] [address]     Gout     History of mammogram 10/28/2016;8/14/17    Negative    History of therapeutic radiation     Hyperlipidemia     Hypertension     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 stage 3 chronic kidney disease ([facility] [address] 9/17/2017    Microalbuminuria 7/1/2016    Neuropathy in diabetes [facility])     Non morbid obesity 7/1/2016    Pancreatitis 5/12/16    [facility] hospitalization 5/12/16-5/16/16:under care of GI:chronic pancreatitis    S/P endoscopy 6/14/2016    [place]:per pt' & her family member was nml.     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 myelopathy or radiculopathy 3/10/2017    Transient cerebral ischemia 07/15/2016    TIA:7/10/16    Unspecified cerebral artery occlusion with cerebral infarction     TIA       Past Surgical History:   Procedure Laterality Date    BREAST LUMPECTOMY  2015    Bilateral:breast cancer    CARDIAC CATHETERIZATION  06/08/2020    Dr. Rajat Sanderson), DAYANA to Diag 1    COLONOSCOPY N/A 2/1/2021    COLONOSCOPY DIAGNOSTIC performed by Ankur Anaya MD at [facility] N/A 2/27/2023    COLONOSCOPY WITH BIOPSY performed by Ankur Anaya MD at [place] [address]  2/3/2021    CT BONE MARROW BIOPSY 2/3/2021 Liat Monroy MD [id number] [address] CT SCAN    HEMICOLECTOMY N/A 3/7/2023    ROBOTIC CONVERTED TO OPEN RIGHT COLECTOMY performed by Maria Esther Gill MD at [address] ([facility])      Benign:no cervical cancer per pt'    KIDNEY REMOVAL      right    OTHER SURGICAL HISTORY Right     orif right ankle    TEMPORAL ARTERY BIOPSY Right 8/9/2021    RIGHT TEMPORAL ARTERY BIOPSY LIGATION performed by Genny Roman MD at [address]  1/29/2021    EGD BIOPSY performed by Ankur Anaya MD at [address] 12/15/2022    EGD BIOPSY performed by Ankur Anaya MD at [address] [facility] ENDOSCOPY        reports that she quit smoking about 8 years ago. Her smoking use included cigarettes and cigars. She has a 10.00 pack-year smoking history. She has never used smokeless tobacco. She reports that she does not drink alcohol and does not use drugs. family history includes Cancer in her father and mother. Medication:     Current Facility-Administered Medications: potassium phosphate 30 mmol in sodium chloride 0.9 % 250 mL IVPB, 30 mmol, IntraVENous, Once  enoxaparin (LOVENOX) injection 40 mg, 40 mg, SubCUTAneous, Daily  amLODIPine (NORVASC) tablet 5 mg, 5 mg, Oral, Daily  insulin glargine (LANTUS) injection vial 10 Units, 10 Units, SubCUTAneous, Daily  traMADol (ULTRAM) tablet 50 mg, 50 mg, Oral, Q6H PRN  metronidazole (FLAGYL) 500 mg in 0.9% NaCl 100 mL IVPB premix, 500 mg, IntraVENous, Q8H  0.9 % sodium chloride infusion, , IntraVENous, Continuous  ceFAZolin (ANCEF) 2000 mg in 0.9% sodium chloride 100 mL IVPB, 2,000 mg, IntraVENous, Q12H  acetaminophen (TYLENOL) tablet 650 mg, 650 mg, Oral, Q4H PRN **OR** acetaminophen (TYLENOL) suppository 650 mg, 650 mg, Rectal, Q4H PRN  insulin lispro (HUMALOG) injection vial 0-8 Units, 0-8 Units, SubCUTAneous, TID WC  insulin lispro (HUMALOG) injection vial 0-4 Units, 0-4 Units, SubCUTAneous, Nightly  insulin regular (HUMULIN R;NOVOLIN R) injection 10 Units, 10 Units, SubCUTAneous, Once  hydrALAZINE (APRESOLINE) injection 5 mg, 5 mg, IntraVENous, Q4H PRN  glucose chewable tablet 16 g, 4 tablet, Oral, PRN  dextrose bolus 10% 125 mL, 125 mL, IntraVENous, PRN **OR** dextrose bolus 10% 250 mL, 250 mL, IntraVENous, PRN  glucagon (rDNA) injection 1 mg, 1 mg, IntraMUSCular, PRN  dextrose 10 % infusion, , IntraVENous, Continuous PRN  aspirin EC tablet 81 mg, 81 mg, Oral, Daily  atorvastatin (LIPITOR) tablet 20 mg, 20 mg, Oral, Daily  clonazePAM (KLONOPIN) tablet 0.5 mg, 0.5 mg, Oral, TID PRN  calcium carb-cholecalciferol 250-3. 125 MG-MCG per tab 1 tablet, 1 tablet, Oral, Daily  [Held by provider] gabapentin (NEURONTIN) capsule 600 mg, 600 mg, Oral, TID  paliperidone (INVEGA) extended release tablet 9 mg, 9 mg, Oral, QAM  pantoprazole (PROTONIX) tablet 40 mg, 40 mg, Oral, Daily  [Held by provider] traZODone (DESYREL) tablet 200 mg, 200 mg, Oral, Nightly  sodium chloride flush 0.9 % injection 5-40 mL, 5-40 mL, IntraVENous, 2 times per day  sodium chloride flush 0.9 % injection 5-40 mL, 5-40 mL, IntraVENous, PRN  0.9 % sodium chloride infusion, , IntraVENous, PRN  ondansetron (ZOFRAN-ODT) disintegrating tablet 4 mg, 4 mg, Oral, Q8H PRN **OR** ondansetron (ZOFRAN) injection 4 mg, 4 mg, IntraVENous, Q6H PRN  polyethylene glycol (GLYCOLAX) packet 17 g, 17 g, Oral, Daily PRN       Vitals :     Vitals:    03/11/23 1123   BP: (!) 130/56   Pulse: 77   Resp:    Temp:    SpO2: 95%       I & O :       Intake/Output Summary (Last 24 hours) at 3/11/2023 1546  Last data filed at 3/11/2023 1445  Gross per 24 hour   Intake 1260.65 ml   Output 885 ml   Net 375.65 ml        Physical Examination :     General appearance:  in NAD, fully alert and oriented. Comfortable. HEENT: EOM intact, no icterus. Trachea is midline. Neck : No mass, appears symmetrical, no JVD   Respiratory: Respiratory effort RR no edema  Abdomen: No visible mass or tenderness  Musculoskeletal:  No clubbing,cyanosis, joints with no swelling or deformity. Skin:no rashes, ulcers, induration, no jaundice. Neuro: face symmetric, no focal deficits. Appropriate responses.  CN 2-12 grossly intact         LABS:     Recent Labs     03/09/23  0520 03/11/23  1210   WBC 10.5 6.8   HGB 8.1* 7.1*   HCT 25.4* 23.8*    196     Recent Labs     03/09/23  0520 03/10/23  0521 03/11/23  0443 03/11/23  1210    142 139  --    K 5.2* 3.8 3.7  --     106 106  --    CO2 25 25 24  --    BUN 33* 33* 30*  --    CREATININE 2.0* 1.5* 1.4*  --    GLUCOSE 174* 64* 273*  --    MG 1.60*  --   --  2.50*   PHOS 3.9  --   --  1.5* Thanks,   [facility]  [address] [facility], [facility] [place] [facility]  Office: [phone]  Fax: [phone]

## 2023-03-11 NOTE — PROGRESS NOTES
Hospitalist Progress Note      PCP: Moreno Sheth    Date of Admission: 2/26/2023    Chief Complaint:   Hypoglycemia     Subjective:    Somnolent, arousable to verb stim, reports feeling tired. No specific complaints    Medications:  Reviewed    Infusion Medications    sodium chloride 50 mL/hr at 03/11/23 0332    dextrose      sodium chloride       Scheduled Medications    enoxaparin  40 mg SubCUTAneous Daily    amLODIPine  5 mg Oral Daily    insulin glargine  10 Units SubCUTAneous Daily    metroNIDAZOLE  500 mg IntraVENous Q8H    ceFAZolin  2,000 mg IntraVENous Q12H    insulin lispro  0-8 Units SubCUTAneous TID     insulin lispro  0-4 Units SubCUTAneous Nightly    insulin regular  10 Units SubCUTAneous Once    aspirin  81 mg Oral Daily    atorvastatin  20 mg Oral Daily    calcium carb-cholecalciferol  1 tablet Oral Daily    [Held by provider] gabapentin  600 mg Oral TID    paliperidone  9 mg Oral QAM    pantoprazole  40 mg Oral Daily    [Held by provider] traZODone  200 mg Oral Nightly    sodium chloride flush  5-40 mL IntraVENous 2 times per day     PRN Meds: traMADol, acetaminophen **OR** acetaminophen, hydrALAZINE, glucose, dextrose bolus **OR** dextrose bolus, glucagon (rDNA), dextrose, clonazePAM, sodium chloride flush, sodium chloride, ondansetron **OR** ondansetron, polyethylene glycol      Intake/Output Summary (Last 24 hours) at 3/11/2023 1522  Last data filed at 3/11/2023 1445  Gross per 24 hour   Intake 2074.72 ml   Output 1885 ml   Net 189.72 ml         Physical Exam Performed:    BP (!) 130/56   Pulse 77   Temp 99.6 °F (37.6 °C) (Oral)   Resp 16   Ht 5' 3\" (1.6 m)   Wt 123 lb (55.8 kg)   SpO2 95%   BMI 21.79 kg/m²     General appearance: No apparent distress, appears stated age and cooperative. HEENT: Pupils equal, round, and reactive to light. Conjunctivae/corneas clear. Neck: Supple, with full range of motion. No jugular venous distention. Trachea midline.   Respiratory:  Normal respiratory effort. Clear to auscultation, bilaterally without Rales/Wheezes/Rhonchi. Cardiovascular: Regular rate and rhythm with normal S1/S2 without murmurs, rubs or gallops. Abdomen: Soft, nontender. Positive bowel sounds. Musculoskeletal: No clubbing, cyanosis or edema bilaterally. Full range of motion without deformity. Skin: Skin color, texture, turgor normal.  No rashes or lesions. Neurologic:  Neurovascularly intact without any focal sensory/motor deficits. Cranial nerves: II-XII intact, grossly non-focal.  Psychiatric: Somnolent  Capillary Refill: Brisk, 3 seconds, normal   Peripheral Pulses: +2 palpable, equal bilaterally       Labs:   Recent Labs     03/09/23  0520 03/11/23  1210   WBC 10.5 6.8   HGB 8.1* 7.1*   HCT 25.4* 23.8*    196       Recent Labs     03/09/23  0520 03/10/23  0521 03/11/23  0443 03/11/23  1210    142 139  --    K 5.2* 3.8 3.7  --     106 106  --    CO2 25 25 24  --    BUN 33* 33* 30*  --    CREATININE 2.0* 1.5* 1.4*  --    CALCIUM 8.2* 8.1* 8.1*  --    PHOS 3.9  --   --  1.5*       No results for input(s): AST, ALT, BILIDIR, BILITOT, ALKPHOS in the last 72 hours. No results for input(s): INR in the last 72 hours. No results for input(s): Othelia Nutley in the last 72 hours. Urinalysis:      Lab Results   Component Value Date/Time    NITRU Negative 03/11/2023 01:23 PM    WBCUA 0-2 03/11/2023 01:23 PM    BACTERIA 2+ 12/10/2022 01:42 PM    RBCUA 0-2 03/11/2023 01:23 PM    BLOODU SMALL 03/11/2023 01:23 PM    SPECGRAV 1.015 03/11/2023 01:23 PM    GLUCOSEU 250 03/11/2023 01:23 PM    GLUCOSEU >=1000 mg/dL 06/07/2010 03:38 PM       Radiology:  XR CHEST (2 VW)   Final Result   Bibasilar pulmonary opacities could represent atelectasis or infection         XR CHEST PORTABLE   Final Result   No evidence of edema or pneumonia. Mild cardiac silhouette enlargement. CT ABDOMEN PELVIS WO CONTRAST Additional Contrast? None   Final Result   1.   No acute abdominal or pelvic findings. 2.  Large amount of formed stool throughout the colon. Correlate with any   evidence of constipation. XR ABDOMEN (KUB) (SINGLE AP VIEW)   Final Result   1. Moderate stool in the colon, correlate with signs of constipation. US RETROPERITONEAL COMPLETE   Final Result   Left kidney is unremarkable         CT HEAD WO CONTRAST   Final Result   No acute intracranial abnormality. Mild cerebral white matter chronic microvascular ischemic disease. Chronic right maxillary sinus opacification in setting of chronic sinusitis. 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 Patient was apparently found unresponsive on the toilet while doing her bowel prep for a colonoscopy procedure scheduled tomorrow with [facility]. She also supposed to get an EGD. Patient reports she remembers getting diaphoretic and weak just prior to using the bathroom. And after she sat on the toilet she became very lightheaded and lost her consciousness. She reports she takes 22 units of Lantus in the morning which she did. Was instructed not to eat anything so she drank some water and Gatorade in the morning. She also proceeded to take 8 units of the short acting in the morning.   Patient reports she is not aware that she is not supposed to take the short acting insulin with a meal if she was not going to eat anything for the meal.  She has been on insulin and a diabetic for about 10 years. EMS was called, and they reported her blood sugar was 40 at the scene. \"    Colon Cancer: New diagnosis. Weight loss, abdominal pain, constipation and elevated CEA. Colonoscopy 2/27, pathology report now finalized and showed invasive Adenocarcinoma. S/p Colectomy 3/7. Pathology suggested localized disease; per Oncology, may not need additional chemotherapy. NGT out. Advance diet as tolerated. Pain control. Hypoglycemia due to insulin use with decreased p.o. intake. Blood sugars were labile pre-op and patient switched to NPH. Now with low BS as diet being advanced slowly. Will resume lower dose Lantus with SSI and monitor. SHARRON with acidosis. 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 Patient was on a bicarb drip. Improved but then worsened again last few days post-op. Continue hydration. Nephrology following. Monitor renal function. Hypertension. Blood pressure labile. PO meds on hold while NPO. Monitor. Somnolent this morning, wbc 6.8, h&h 7.1/23.8, LA 1.6, PCT 20.02, CXR: Bibasilar pulmonary opacities could represent atelectasis or infection, temp 100.5. Check UA-has petersen, Current abx:Cefazolin/Flagyl. Hold gabapentin and trazodone. DVT Prophylaxis: Lovenox  Diet: ADULT DIET; Clear Liquid  ADULT ORAL NUTRITION SUPPLEMENT; Breakfast, Dinner; Clear Liquid Oral Supplement  Code Status: Full Code  PT/OT Eval Status: patient ambulatory at home. Therapy re-ordered post-op. Dispo - >2days.  May need to consider SNF pending therapy    Daniel Madrid, SARTHAK - CNP

## 2023-03-11 NOTE — PROGRESS NOTES
[facility] GENERAL SURGERY DAILY PROGRESS NOTE    SUBJECTIVE: Awakens but somnolent    OBJECTIVE: CURRENT VITALS:  BP (!) 132/52   Pulse 82   Temp 99.6 °F (37.6 °C) (Oral)   Resp 16   Ht 5' 3\" (1.6 m)   Wt 123 lb (55.8 kg)   SpO2 96%   BMI 21.79 kg/m²          ABD: Soft. Minimal tenderness. INCISION:  C/D/I  HEATHER serous.     LABS:    CBC:   Recent Labs     03/08/23  1353 03/09/23  0520   WBC 11.2* 10.5   RBC 3.24* 2.84*   HGB 9.0* 8.1*   HCT 29.4* 25.4*   MCV 90.8 89.4   RDW 15.2 14.8    142     BMP:   Recent Labs     03/09/23  0520 03/10/23  0521 03/11/23  0443    142 139   K 5.2* 3.8 3.7    106 106   CO2 25 25 24   PHOS 3.9  --   --    BUN 33* 33* 30*   CREATININE 2.0* 1.5* 1.4*     Recent Labs     03/09/23  0520   MG 1.60*             ASSESSMENT:   POD 4 R colectomy  Fever      PLAN:   CXR  UA  Labs  Continue clears for now         Michael Piedra MD

## 2023-03-11 NOTE — PROGRESS NOTES
Made MD aware that pt is being flagged for sepsis protocol. Also made MD aware that pt lethargic overnight and still is this morning. A/ox4 and VSS.

## 2023-03-11 NOTE — PROGRESS NOTES
Pt assessment completed and charted. VSS. Pt a/ox4. Pt has been in and out of being lethargic today. This morning pt was able to take pills on own, but then this afternoon couldn't stay awake for more then a minute. Then later today, pt held a conversation and used call light. Pt has f/c that was exchanged today for UA to be completed. Dooley care and CHG bath completed. Pt tolerating diet. Pt denies any other needs at this time. Pt calls out appropriately. Pt is a fall risk;  -Bed in lowest position and wheels locked. -Call light within reach.   -Bedside table within reach.   -Non-skid footwear in place.  -bed check in place.

## 2023-03-11 NOTE — PLAN OF CARE
Problem: Discharge Planning  Goal: Discharge to home or other facility with appropriate resources  3/10/2023 2229 by Reyes Daubs  Outcome: Progressing  Flowsheets (Taken 3/10/2023 2110)  Discharge to home or other facility with appropriate resources:   Identify barriers to discharge with patient and caregiver   Arrange for needed discharge resources and transportation as appropriate   Identify discharge learning needs (meds, wound care, etc)   Arrange for interpreters to assist at discharge as needed   Refer to discharge planning if patient needs post-hospital services based on physician order or complex needs related to functional status, cognitive ability or social support system  3/10/2023 1451 by Satya Cantu RN  Outcome: Progressing     Problem: Pain  Goal: Verbalizes/displays adequate comfort level or baseline comfort level  3/10/2023 2229 by Reyes Daubs  Outcome: Progressing  Flowsheets (Taken 3/10/2023 0306)  Verbalizes/displays adequate comfort level or baseline comfort level:   Encourage patient to monitor pain and request assistance   Assess pain using appropriate pain scale   Implement non-pharmacological measures as appropriate and evaluate response   Administer analgesics based on type and severity of pain and evaluate response   Consider cultural and social influences on pain and pain management   Notify Licensed Independent Practitioner if interventions unsuccessful or patient reports new pain  3/10/2023 1451 by Satya Cantu RN  Outcome: Progressing     Problem: Chronic Conditions and Co-morbidities  Goal: Patient's chronic conditions and co-morbidity symptoms are monitored and maintained or improved  3/10/2023 2229 by Reyes Daubs  Outcome: Progressing  Flowsheets (Taken 3/10/2023 2110)  Care Plan - Patient's Chronic Conditions and Co-Morbidity Symptoms are Monitored and Maintained or Improved:   Monitor and assess patient's chronic conditions and comorbid symptoms for stability, deterioration, or improvement   Collaborate with multidisciplinary team to address chronic and comorbid conditions and prevent exacerbation or deterioration   Update acute care plan with appropriate goals if chronic or comorbid symptoms are exacerbated and prevent overall improvement and discharge  3/10/2023 1451 by Juan Mcfadden RN  Outcome: Progressing     Problem: Gastrointestinal - Adult  Goal: Minimal or absence of nausea and vomiting  3/10/2023 1451 by Juan Mcfadden RN  Outcome: Progressing  Goal: Maintains or returns to baseline bowel function  3/10/2023 1451 by Juan Mcfadden RN  Outcome: Progressing  Goal: Maintains adequate nutritional intake  3/10/2023 1451 by Juan Mcfadden RN  Outcome: Progressing     Problem: Metabolic/Fluid and Electrolytes - Adult  Goal: Glucose maintained within prescribed range  Recent Flowsheet Documentation  Taken 3/10/2023 2110 by Shoshana Escobar  Glucose maintained within prescribed range: Monitor blood glucose as ordered  3/10/2023 1451 by Juan Mcfadden RN  Outcome: Progressing  Goal: Electrolytes maintained within normal limits  Recent Flowsheet Documentation  Taken 3/10/2023 2110 by Shoshana Escobar  Electrolytes maintained within normal limits:   Monitor labs and assess patient for signs and symptoms of electrolyte imbalances   Administer electrolyte replacement as ordered   Fluid restriction as ordered   Monitor response to electrolyte replacements, including repeat lab results as appropriate   Instruct patient on fluid and nutrition restrictions as appropriate  3/10/2023 1451 by Juan Mcfadden RN  Outcome: Progressing     Problem: Safety - Adult  Goal: Free from fall injury  3/10/2023 1451 by Juan Mcfadden RN  Outcome: Progressing     Problem: Nutrition Deficit:  Goal: Optimize nutritional status  3/10/2023 1451 by Juan Mcfadden RN  Outcome: Progressing  Flowsheets (Taken 3/10/2023 1031 by Jeyson Mcnamara, MS, RD, LD)  Nutrient intake appropriate for improving, restoring, or maintaining nutritional needs: Assess nutritional status and recommend course of action     Problem: Cardiovascular - Adult  Goal: Maintains optimal cardiac output and hemodynamic stability  Recent Flowsheet Documentation  Taken 3/10/2023 2110 by Alex Dewitt optimal cardiac output and hemodynamic stability: Monitor blood pressure and heart rate  3/10/2023 1451 by Jordan Saul RN  Outcome: Progressing  Goal: Absence of cardiac dysrhythmias or at baseline  3/10/2023 1451 by Jordan Saul RN  Outcome: Progressing     Problem: Hematologic - Adult  Goal: Maintains hematologic stability  Recent Flowsheet Documentation  Taken 3/10/2023 2110 by Alex Dewitt hematologic stability:   Assess for signs and symptoms of bleeding or hemorrhage   Monitor labs for bleeding or clotting disorders   Administer blood products/factors as ordered  3/10/2023 1451 by Jordan Saul RN  Outcome: Progressing     Problem: Skin/Tissue Integrity  Goal: Absence of new skin breakdown  Description: 1. Monitor for areas of redness and/or skin breakdown  2. Assess vascular access sites hourly  3. Every 4-6 hours minimum:  Change oxygen saturation probe site  4. Every 4-6 hours:  If on nasal continuous positive airway pressure, respiratory therapy assess nares and determine need for appliance change or resting period.   3/10/2023 1451 by Jordan Saul RN  Outcome: Progressing

## 2023-03-12 LAB
ABO/RH: NORMAL
ANION GAP SERPL CALCULATED.3IONS-SCNC: 6 MMOL/L (ref 3–16)
ANTIBODY SCREEN: NORMAL
BASOPHILS ABSOLUTE: 0 K/UL (ref 0–0.2)
BASOPHILS RELATIVE PERCENT: 0.9 %
BLOOD BANK DISPENSE STATUS: NORMAL
BLOOD BANK PRODUCT CODE: NORMAL
BPU ID: NORMAL
BUN BLDV-MCNC: 21 MG/DL (ref 7–20)
CALCIUM SERPL-MCNC: 7.5 MG/DL (ref 8.3–10.6)
CHLORIDE BLD-SCNC: 111 MMOL/L (ref 99–110)
CO2: 21 MMOL/L (ref 21–32)
CREAT SERPL-MCNC: 1.2 MG/DL (ref 0.6–1.2)
DESCRIPTION BLOOD BANK: NORMAL
EOSINOPHILS ABSOLUTE: 0 K/UL (ref 0–0.6)
EOSINOPHILS RELATIVE PERCENT: 1 %
GFR SERPL CREATININE-BSD FRML MDRD: 51 ML/MIN/{1.73_M2}
GLUCOSE BLD-MCNC: 201 MG/DL (ref 70–99)
GLUCOSE BLD-MCNC: 267 MG/DL (ref 70–99)
GLUCOSE BLD-MCNC: 315 MG/DL (ref 70–99)
GLUCOSE BLD-MCNC: 342 MG/DL (ref 70–99)
GLUCOSE BLD-MCNC: 347 MG/DL (ref 70–99)
GLUCOSE BLD-MCNC: 347 MG/DL (ref 70–99)
HCT VFR BLD CALC: 19.9 % (ref 36–48)
HCT VFR BLD CALC: 27.3 % (ref 36–48)
HEMOGLOBIN: 6.2 G/DL (ref 12–16)
HEMOGLOBIN: 8.8 G/DL (ref 12–16)
LYMPHOCYTES ABSOLUTE: 1.2 K/UL (ref 1–5.1)
LYMPHOCYTES RELATIVE PERCENT: 25 %
MCH RBC QN AUTO: 28.1 PG (ref 26–34)
MCHC RBC AUTO-ENTMCNC: 31.5 G/DL (ref 31–36)
MCV RBC AUTO: 89.4 FL (ref 80–100)
MONOCYTES ABSOLUTE: 0.6 K/UL (ref 0–1.3)
MONOCYTES RELATIVE PERCENT: 11.7 %
NEUTROPHILS ABSOLUTE: 2.9 K/UL (ref 1.7–7.7)
NEUTROPHILS RELATIVE PERCENT: 61.4 %
PDW BLD-RTO: 14.8 % (ref 12.4–15.4)
PERFORMED ON: ABNORMAL
PHOSPHORUS: 2.7 MG/DL (ref 2.5–4.9)
PLATELET # BLD: 164 K/UL (ref 135–450)
PMV BLD AUTO: 9 FL (ref 5–10.5)
POTASSIUM REFLEX MAGNESIUM: 4.5 MMOL/L (ref 3.5–5.1)
PROCALCITONIN: 13.06 NG/ML (ref 0–0.15)
RBC # BLD: 2.22 M/UL (ref 4–5.2)
SODIUM BLD-SCNC: 138 MMOL/L (ref 136–145)
WBC # BLD: 4.8 K/UL (ref 4–11)

## 2023-03-12 PROCEDURE — 36430 TRANSFUSION BLD/BLD COMPNT: CPT

## 2023-03-12 PROCEDURE — 6370000000 HC RX 637 (ALT 250 FOR IP): Performed by: SURGERY

## 2023-03-12 PROCEDURE — 6370000000 HC RX 637 (ALT 250 FOR IP): Performed by: NURSE PRACTITIONER

## 2023-03-12 PROCEDURE — 6370000000 HC RX 637 (ALT 250 FOR IP): Performed by: INTERNAL MEDICINE

## 2023-03-12 PROCEDURE — 84100 ASSAY OF PHOSPHORUS: CPT

## 2023-03-12 PROCEDURE — 86901 BLOOD TYPING SEROLOGIC RH(D): CPT

## 2023-03-12 PROCEDURE — 85014 HEMATOCRIT: CPT

## 2023-03-12 PROCEDURE — 2580000003 HC RX 258: Performed by: SURGERY

## 2023-03-12 PROCEDURE — 84145 PROCALCITONIN (PCT): CPT

## 2023-03-12 PROCEDURE — 2700000000 HC OXYGEN THERAPY PER DAY

## 2023-03-12 PROCEDURE — 6360000002 HC RX W HCPCS: Performed by: INTERNAL MEDICINE

## 2023-03-12 PROCEDURE — 2500000003 HC RX 250 WO HCPCS: Performed by: SURGERY

## 2023-03-12 PROCEDURE — 86923 COMPATIBILITY TEST ELECTRIC: CPT

## 2023-03-12 PROCEDURE — 1200000000 HC SEMI PRIVATE

## 2023-03-12 PROCEDURE — 94761 N-INVAS EAR/PLS OXIMETRY MLT: CPT

## 2023-03-12 PROCEDURE — 6360000002 HC RX W HCPCS: Performed by: SURGERY

## 2023-03-12 PROCEDURE — 85025 COMPLETE CBC W/AUTO DIFF WBC: CPT

## 2023-03-12 PROCEDURE — P9016 RBC LEUKOCYTES REDUCED: HCPCS

## 2023-03-12 PROCEDURE — 86900 BLOOD TYPING SEROLOGIC ABO: CPT

## 2023-03-12 PROCEDURE — 2580000003 HC RX 258: Performed by: INTERNAL MEDICINE

## 2023-03-12 PROCEDURE — 36415 COLL VENOUS BLD VENIPUNCTURE: CPT

## 2023-03-12 PROCEDURE — 80048 BASIC METABOLIC PNL TOTAL CA: CPT

## 2023-03-12 PROCEDURE — 85018 HEMOGLOBIN: CPT

## 2023-03-12 PROCEDURE — 86850 RBC ANTIBODY SCREEN: CPT

## 2023-03-12 RX ORDER — INSULIN LISPRO 100 [IU]/ML
0-4 INJECTION, SOLUTION INTRAVENOUS; SUBCUTANEOUS NIGHTLY
Status: DISCONTINUED | OUTPATIENT
Start: 2023-03-12 | End: 2023-03-15 | Stop reason: HOSPADM

## 2023-03-12 RX ORDER — INSULIN GLARGINE 100 [IU]/ML
10 INJECTION, SOLUTION SUBCUTANEOUS ONCE
Status: DISCONTINUED | OUTPATIENT
Start: 2023-03-12 | End: 2023-03-12

## 2023-03-12 RX ORDER — SODIUM CHLORIDE 9 MG/ML
INJECTION, SOLUTION INTRAVENOUS PRN
Status: DISCONTINUED | OUTPATIENT
Start: 2023-03-12 | End: 2023-03-15 | Stop reason: HOSPADM

## 2023-03-12 RX ORDER — INSULIN LISPRO 100 [IU]/ML
0-16 INJECTION, SOLUTION INTRAVENOUS; SUBCUTANEOUS
Status: DISCONTINUED | OUTPATIENT
Start: 2023-03-12 | End: 2023-03-15 | Stop reason: HOSPADM

## 2023-03-12 RX ADMIN — TRAMADOL HYDROCHLORIDE 50 MG: 50 TABLET, COATED ORAL at 06:41

## 2023-03-12 RX ADMIN — ATORVASTATIN CALCIUM 20 MG: 10 TABLET, FILM COATED ORAL at 09:58

## 2023-03-12 RX ADMIN — METRONIDAZOLE 500 MG: 500 INJECTION, SOLUTION INTRAVENOUS at 03:34

## 2023-03-12 RX ADMIN — METRONIDAZOLE 500 MG: 500 INJECTION, SOLUTION INTRAVENOUS at 14:22

## 2023-03-12 RX ADMIN — Medication 2000 MG: at 13:05

## 2023-03-12 RX ADMIN — ASPIRIN 81 MG: 81 TABLET, COATED ORAL at 09:58

## 2023-03-12 RX ADMIN — ACETAMINOPHEN 325MG 650 MG: 325 TABLET ORAL at 18:58

## 2023-03-12 RX ADMIN — SODIUM CHLORIDE, PRESERVATIVE FREE 10 ML: 5 INJECTION INTRAVENOUS at 09:59

## 2023-03-12 RX ADMIN — PALIPERIDONE 9 MG: 3 TABLET, EXTENDED RELEASE ORAL at 09:58

## 2023-03-12 RX ADMIN — INSULIN LISPRO 6 UNITS: 100 INJECTION, SOLUTION INTRAVENOUS; SUBCUTANEOUS at 11:50

## 2023-03-12 RX ADMIN — METRONIDAZOLE 500 MG: 500 INJECTION, SOLUTION INTRAVENOUS at 22:16

## 2023-03-12 RX ADMIN — INSULIN LISPRO 6 UNITS: 100 INJECTION, SOLUTION INTRAVENOUS; SUBCUTANEOUS at 09:49

## 2023-03-12 RX ADMIN — PANTOPRAZOLE SODIUM 40 MG: 40 TABLET, DELAYED RELEASE ORAL at 09:58

## 2023-03-12 RX ADMIN — SODIUM CHLORIDE, PRESERVATIVE FREE 10 ML: 5 INJECTION INTRAVENOUS at 22:23

## 2023-03-12 RX ADMIN — Medication 1 TABLET: at 09:58

## 2023-03-12 RX ADMIN — SODIUM CHLORIDE: 9 INJECTION, SOLUTION INTRAVENOUS at 01:27

## 2023-03-12 RX ADMIN — SODIUM CHLORIDE 25 ML: 9 INJECTION, SOLUTION INTRAVENOUS at 22:22

## 2023-03-12 RX ADMIN — ACETAMINOPHEN 325MG 650 MG: 325 TABLET ORAL at 22:19

## 2023-03-12 RX ADMIN — Medication 2000 MG: at 22:22

## 2023-03-12 RX ADMIN — ACETAMINOPHEN 325MG 650 MG: 325 TABLET ORAL at 10:19

## 2023-03-12 RX ADMIN — SODIUM CHLORIDE 25 ML: 9 INJECTION, SOLUTION INTRAVENOUS at 22:16

## 2023-03-12 RX ADMIN — INSULIN GLARGINE 10 UNITS: 100 INJECTION, SOLUTION SUBCUTANEOUS at 09:49

## 2023-03-12 RX ADMIN — ENOXAPARIN SODIUM 40 MG: 100 INJECTION SUBCUTANEOUS at 09:57

## 2023-03-12 RX ADMIN — INSULIN LISPRO 4 UNITS: 100 INJECTION, SOLUTION INTRAVENOUS; SUBCUTANEOUS at 16:52

## 2023-03-12 RX ADMIN — TRAMADOL HYDROCHLORIDE 50 MG: 50 TABLET, COATED ORAL at 16:56

## 2023-03-12 ASSESSMENT — PAIN DESCRIPTION - DESCRIPTORS
DESCRIPTORS: ACHING
DESCRIPTORS: ACHING;CRAMPING
DESCRIPTORS: CRAMPING;ACHING
DESCRIPTORS: ACHING
DESCRIPTORS: ACHING

## 2023-03-12 ASSESSMENT — PAIN DESCRIPTION - PAIN TYPE: TYPE: SURGICAL PAIN

## 2023-03-12 ASSESSMENT — PAIN DESCRIPTION - LOCATION
LOCATION: ABDOMEN
LOCATION: ABDOMEN
LOCATION: HEAD
LOCATION: ABDOMEN
LOCATION: ABDOMEN

## 2023-03-12 ASSESSMENT — PAIN - FUNCTIONAL ASSESSMENT
PAIN_FUNCTIONAL_ASSESSMENT: ACTIVITIES ARE NOT PREVENTED
PAIN_FUNCTIONAL_ASSESSMENT: ACTIVITIES ARE NOT PREVENTED

## 2023-03-12 ASSESSMENT — PAIN DESCRIPTION - FREQUENCY: FREQUENCY: INTERMITTENT

## 2023-03-12 ASSESSMENT — PAIN SCALES - GENERAL
PAINLEVEL_OUTOF10: 9
PAINLEVEL_OUTOF10: 8
PAINLEVEL_OUTOF10: 10
PAINLEVEL_OUTOF10: 3
PAINLEVEL_OUTOF10: 3

## 2023-03-12 ASSESSMENT — PAIN DESCRIPTION - ORIENTATION
ORIENTATION: MID

## 2023-03-12 NOTE — PROGRESS NOTES
Hospitalist Progress Note      PCP: Rosalie Nuñez    Date of Admission: 2/26/2023    Chief Complaint:   Hypoglycemia     Subjective:    More alert today, hgb 6.2    Medications:  Reviewed    Infusion Medications    sodium chloride      sodium chloride 50 mL/hr at 03/12/23 0446    dextrose      sodium chloride       Scheduled Medications    insulin lispro  0-16 Units SubCUTAneous TID WC    insulin lispro  0-4 Units SubCUTAneous Nightly    enoxaparin  40 mg SubCUTAneous Daily    amLODIPine  5 mg Oral Daily    insulin glargine  10 Units SubCUTAneous Daily    metroNIDAZOLE  500 mg IntraVENous Q8H    ceFAZolin  2,000 mg IntraVENous Q12H    insulin regular  10 Units SubCUTAneous Once    aspirin  81 mg Oral Daily    atorvastatin  20 mg Oral Daily    calcium carb-cholecalciferol  1 tablet Oral Daily    [Held by provider] gabapentin  600 mg Oral TID    paliperidone  9 mg Oral QAM    pantoprazole  40 mg Oral Daily    [Held by provider] traZODone  200 mg Oral Nightly    sodium chloride flush  5-40 mL IntraVENous 2 times per day     PRN Meds: sodium chloride, traMADol, acetaminophen **OR** acetaminophen, hydrALAZINE, glucose, dextrose bolus **OR** dextrose bolus, glucagon (rDNA), dextrose, clonazePAM, sodium chloride flush, sodium chloride, ondansetron **OR** ondansetron, polyethylene glycol      Intake/Output Summary (Last 24 hours) at 3/12/2023 1219  Last data filed at 3/12/2023 0930  Gross per 24 hour   Intake 1845.19 ml   Output 1560 ml   Net 285.19 ml         Physical Exam Performed:    BP (!) 135/58   Pulse 91   Temp 98.6 °F (37 °C) (Oral)   Resp 18   Ht 5' 3\" (1.6 m)   Wt 123 lb (55.8 kg)   SpO2 96%   BMI 21.79 kg/m²     General appearance: No apparent distress, appears stated age and cooperative. HEENT: Pupils equal, round, and reactive to light. Conjunctivae/corneas clear. Neck: Supple, with full range of motion. No jugular venous distention. Trachea midline. Respiratory:  Normal respiratory effort. Clear to auscultation, bilaterally without Rales/Wheezes/Rhonchi. Cardiovascular: Regular rate and rhythm with normal S1/S2 without murmurs, rubs or gallops. Abdomen: Soft, nontender. Midline incision intact Positive bowel sounds. Musculoskeletal: No clubbing, cyanosis or edema bilaterally. Full range of motion without deformity. Skin: Skin color, texture, turgor normal.  No rashes or lesions. Neurologic:  Neurovascularly intact without any focal sensory/motor deficits. Cranial nerves: II-XII intact, grossly non-focal.  Psychiatric: Alert, mood stable  Capillary Refill: Brisk, 3 seconds, normal   Peripheral Pulses: +2 palpable, equal bilaterally       Labs:   Recent Labs     03/11/23  1210 03/12/23  0515   WBC 6.8 4.8   HGB 7.1* 6.2*   HCT 23.8* 19.9*    164       Recent Labs     03/10/23  0521 03/11/23  0443 03/11/23  1210 03/12/23  0515    139  --  138   K 3.8 3.7  --  4.5    106  --  111*   CO2 25 24  --  21   BUN 33* 30*  --  21*   CREATININE 1.5* 1.4*  --  1.2   CALCIUM 8.1* 8.1*  --  7.5*   PHOS  --   --  1.5* 2.7       No results for input(s): AST, ALT, BILIDIR, BILITOT, ALKPHOS in the last 72 hours. No results for input(s): INR in the last 72 hours. No results for input(s): Serena Earnest in the last 72 hours. Urinalysis:      Lab Results   Component Value Date/Time    NITRU Negative 03/11/2023 01:23 PM    WBCUA 0-2 03/11/2023 01:23 PM    BACTERIA 2+ 12/10/2022 01:42 PM    RBCUA 0-2 03/11/2023 01:23 PM    BLOODU SMALL 03/11/2023 01:23 PM    SPECGRAV 1.015 03/11/2023 01:23 PM    GLUCOSEU 250 03/11/2023 01:23 PM    GLUCOSEU >=1000 mg/dL 06/07/2010 03:38 PM       Radiology:  XR CHEST (2 VW)   Final Result   Bibasilar pulmonary opacities could represent atelectasis or infection         XR CHEST PORTABLE   Final Result   No evidence of edema or pneumonia. Mild cardiac silhouette enlargement.          CT ABDOMEN PELVIS WO CONTRAST Additional Contrast? None   Final Result 1.  No acute abdominal or pelvic findings. 2.  Large amount of formed stool throughout the colon. Correlate with any   evidence of constipation. XR ABDOMEN (KUB) (SINGLE AP VIEW)   Final Result   1. Moderate stool in the colon, correlate with signs of constipation. US RETROPERITONEAL COMPLETE   Final Result   Left kidney is unremarkable         CT HEAD WO CONTRAST   Final Result   No acute intracranial abnormality. Mild cerebral white matter chronic microvascular ischemic disease. Chronic right maxillary sinus opacification in setting of chronic sinusitis. 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 Patient was apparently found unresponsive on the toilet while doing her bowel prep for a colonoscopy procedure scheduled tomorrow with [facility]. She also supposed to get an EGD. Patient reports she remembers getting diaphoretic and weak just prior to using the bathroom. And after she sat on the toilet she became very lightheaded and lost her consciousness. She reports she takes 22 units of Lantus in the morning which she did. Was instructed not to eat anything so she drank some water and Gatorade in the morning. She also proceeded to take 8 units of the short acting in the morning.   Patient reports she is not aware that she is not supposed to take the short acting insulin with a meal if she was not going to eat anything for the meal.  She has been on insulin and a diabetic for about 10 years. EMS was called, and they reported her blood sugar was 40 at the scene. \"    Colon Cancer: New diagnosis. Weight loss, abdominal pain, constipation and elevated CEA. Colonoscopy 2/27, pathology report now finalized and showed invasive Adenocarcinoma. S/p Colectomy 3/7. Pathology suggested localized disease; per Oncology, may not need additional chemotherapy. NGT out. Advance diet as tolerated. Pain control. Anemia 2/2 post op state, colon ca-transfuse PRBC's <7. 1 unit transfused 3/12    Hypoglycemia due to insulin use with decreased p.o. intake. Blood sugars were labile pre-op and patient switched to NPH. Now with low BS as diet being advanced slowly. Will resume lower dose Lantus with SSI and monitor. SHARRON with acidosis. 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 Patient was on a bicarb drip. Improved but then worsened again last few days post-op. Continue hydration. Nephrology following. Monitor renal function. Hypertension. Blood pressure labile. PO meds on hold while NPO. Monitor. Somnolent yest, more alert this am, wbc 4.8, h&h 7.1->6.2, LA 1.6, PCT 20.02->13.06, CXR: Bibasilar pulmonary opacities could represent atelectasis or infection, temp 100.5. repeat UA-stable, Current abx:Cefazolin/Flagyl. Hold gabapentin and trazodone. DVT Prophylaxis: Lovenox  Diet: ADULT DIET; Clear Liquid  ADULT ORAL NUTRITION SUPPLEMENT; Breakfast, Dinner; Clear Liquid Oral Supplement  Code Status: Full Code  PT/OT Eval Status: patient ambulatory at home. Therapy re-ordered post-op. Dispo - >2days.  May need to consider SNF pending therapy    Martinez Carlos, SARTHAK - CNP

## 2023-03-12 NOTE — PLAN OF CARE
Problem: Pain  Goal: Verbalizes/displays adequate comfort level or baseline comfort level  3/12/2023 1157 by Ruddy Gutierrez RN  Outcome: Progressing  Flowsheets (Taken 3/11/2023 2045 by Monie Robbins RN)  Verbalizes/displays adequate comfort level or baseline comfort level:   Encourage patient to monitor pain and request assistance   Assess pain using appropriate pain scale   Administer analgesics based on type and severity of pain and evaluate response   Implement non-pharmacological measures as appropriate and evaluate response   Consider cultural and social influences on pain and pain management     Problem: Gastrointestinal - Adult  Goal: Minimal or absence of nausea and vomiting  3/12/2023 1157 by Ruddy Gutierrez RN  Outcome: Progressing  Flowsheets (Taken 3/11/2023 2045 by Monie Robbins RN)  Minimal or absence of nausea and vomiting:   Administer IV fluids as ordered to ensure adequate hydration   Administer ordered antiemetic medications as needed   Provide nonpharmacologic comfort measures as appropriate     Problem: Safety - Adult  Goal: Free from fall injury  3/12/2023 1157 by Ruddy Gutierrez RN  Outcome: Progressing  Flowsheets (Taken 3/11/2023 1005)  Free From Fall Injury: Based on caregiver fall risk screen, instruct family/caregiver to ask for assistance with transferring infant if caregiver noted to have fall risk factors

## 2023-03-12 NOTE — PROGRESS NOTES
4 Eyes Skin Assessment     The patient is being assess for  Shift Handoff    I agree that 2 RN's have performed a thorough Head to Toe Skin Assessment on the patient. ALL assessment sites listed below have been assessed. Areas assessed by both nurses:   [x]   Head, Face, and Ears   [x]   Shoulders, Back, and Chest  [x]   Arms, Elbows, and Hands   [x]   Coccyx, Sacrum, and IschIum  [x]   Legs, Feet, and Heels        Does the Patient have Skin Breakdown?   No         Maco Prevention initiated:  Yes   Wound Care Orders initiated:  No      [facility] nurse consulted for Pressure Injury (Stage 3,4, Unstageable, DTI, NWPT, and Complex wounds), New and Established Ostomies:    NO    Nurse 1 eSignature: Electronically signed by Ruddy Gutierrez RN on 3/12/23 at 12:46 PM EDT    **SHARE this note so that the co-signing nurse is able to place an eSignature**    Nurse 2 eSignature: Electronically signed by Edwin Kaiser RN on 3/12/23 at 6:25 PM EDT

## 2023-03-12 NOTE — PLAN OF CARE
Problem: Pain  Goal: Verbalizes/displays adequate comfort level or baseline comfort level  Outcome: Progressing  Flowsheets (Taken 3/11/2023 2045)  Verbalizes/displays adequate comfort level or baseline comfort level:   Encourage patient to monitor pain and request assistance   Assess pain using appropriate pain scale   Administer analgesics based on type and severity of pain and evaluate response   Implement non-pharmacological measures as appropriate and evaluate response   Consider cultural and social influences on pain and pain management     Problem: Chronic Conditions and Co-morbidities  Goal: Patient's chronic conditions and co-morbidity symptoms are monitored and maintained or improved  Outcome: Progressing  Flowsheets (Taken 3/11/2023 2045)  Care Plan - Patient's Chronic Conditions and Co-Morbidity Symptoms are Monitored and Maintained or Improved: Monitor and assess patient's chronic conditions and comorbid symptoms for stability, deterioration, or improvement     Problem: Gastrointestinal - Adult  Goal: Minimal or absence of nausea and vomiting  Outcome: Progressing  Flowsheets (Taken 3/11/2023 2045)  Minimal or absence of nausea and vomiting:   Administer IV fluids as ordered to ensure adequate hydration   Administer ordered antiemetic medications as needed   Provide nonpharmacologic comfort measures as appropriate  Goal: Maintains or returns to baseline bowel function  Outcome: Progressing  Flowsheets (Taken 3/11/2023 2045)  Maintains or returns to baseline bowel function:   Assess bowel function   Encourage oral fluids to ensure adequate hydration   Administer IV fluids as ordered to ensure adequate hydration   Administer ordered medications as needed   Encourage mobilization and activity  Goal: Maintains adequate nutritional intake  Outcome: Progressing     Problem: Metabolic/Fluid and Electrolytes - Adult  Goal: Glucose maintained within prescribed range  Outcome: Progressing  Flowsheets (Taken 3/11/2023 2045)  Glucose maintained within prescribed range:   Monitor blood glucose as ordered   Assess for signs and symptoms of hyperglycemia and hypoglycemia   Administer ordered medications to maintain glucose within target range  Goal: Electrolytes maintained within normal limits  Outcome: Progressing  Flowsheets (Taken 3/11/2023 2045)  Electrolytes maintained within normal limits:   Monitor labs and assess patient for signs and symptoms of electrolyte imbalances   Administer electrolyte replacement as ordered   Monitor response to electrolyte replacements, including repeat lab results as appropriate     Problem: Safety - Adult  Goal: Free from fall injury  Outcome: Progressing  Flowsheets (Taken 3/11/2023 1005 by Aureliano Morales, RN)  Free From Fall Injury: Based on caregiver fall risk screen, instruct family/caregiver to ask for assistance with transferring infant if caregiver noted to have fall risk factors     Problem: Nutrition Deficit:  Goal: Optimize nutritional status  Outcome: Progressing  Flowsheets (Taken 3/10/2023 1031 by Silvana Morales, MS, RD, LD)  Nutrient intake appropriate for improving, restoring, or maintaining nutritional needs: Assess nutritional status and recommend course of action     Problem: Cardiovascular - Adult  Goal: Maintains optimal cardiac output and hemodynamic stability  Outcome: Progressing  Flowsheets (Taken 3/11/2023 2045)  Maintains optimal cardiac output and hemodynamic stability:   Monitor blood pressure and heart rate   Monitor urine output and notify Licensed Independent Practitioner for values outside of normal range   Assess for signs of decreased cardiac output   Administer fluid and/or volume expanders as ordered  Goal: Absence of cardiac dysrhythmias or at baseline  Outcome: Progressing  Flowsheets (Taken 3/11/2023 2045)  Absence of cardiac dysrhythmias or at baseline:   Monitor cardiac rate and rhythm   Assess for signs of decreased cardiac output   Administer antiarrhythmia medication and electrolyte replacement as ordered     Problem: Hematologic - Adult  Goal: Maintains hematologic stability  Outcome: Progressing  Flowsheets (Taken 3/11/2023 2045)  Maintains hematologic stability: Assess for signs and symptoms of bleeding or hemorrhage     Problem: Skin/Tissue Integrity  Goal: Absence of new skin breakdown  Description: 1. Monitor for areas of redness and/or skin breakdown  2. Assess vascular access sites hourly  3. Every 4-6 hours minimum:  Change oxygen saturation probe site  4. Every 4-6 hours:  If on nasal continuous positive airway pressure, respiratory therapy assess nares and determine need for appliance change or resting period.   Outcome: Progressing

## 2023-03-12 NOTE — PROGRESS NOTES
Pt assessment completed and charted. VSS. Pt a/ox4. Dooley care and CHG bath completed. Pt bathed and hair washed this morning. Pt more alert today and reports feeling better. Pt has midline w/ staples, x4 lpa incisions, and R HEATHER drain. Pt tolerating diet. pt educated on prn pain meds, meds given per mar. Pt rateing abd pain, surgical 8/10. Pt denies any other needs at this time. Pt calls out appropriately. Pt is a fall risk;  -Bed in lowest position and wheels locked. -Call light within reach.   -Bedside table within reach.   -Non-skid footwear in place.  -bed check in place.

## 2023-03-12 NOTE — PROGRESS NOTES
[facility] GENERAL SURGERY DAILY PROGRESS NOTE    SUBJECTIVE: Awake, alert    OBJECTIVE: CURRENT VITALS:  BP (!) 135/58   Pulse 91   Temp 98.6 °F (37 °C) (Oral)   Resp 18   Ht 5' 3\" (1.6 m)   Wt 123 lb (55.8 kg)   SpO2 96%   BMI 21.79 kg/m²          ABD: Soft. Mild tenderness.      LABS:    CBC:   Recent Labs     03/11/23  1210 03/12/23  0515   WBC 6.8 4.8   RBC 2.58* 2.22*   HGB 7.1* 6.2*   HCT 23.8* 19.9*   MCV 92.2 89.4   RDW 15.3 14.8    164     BMP:   Recent Labs     03/10/23  0521 03/11/23  0443 03/11/23  1210 03/12/23  0515    139  --  138   K 3.8 3.7  --  4.5    106  --  111*   CO2 25 24  --  21   PHOS  --   --  1.5* 2.7   BUN 33* 30*  --  21*   CREATININE 1.5* 1.4*  --  1.2     Recent Labs     03/11/23  1210   MG 2.50*       ASSESSMENT:   POD 5 R colectomy  Temperature curve improved   Altered mental status resolved   Anemia    PLAN:   Advance diet  PRBC      Cecilio Haynes MD

## 2023-03-12 NOTE — PROGRESS NOTES
Interval History and plan:      Patient was seen and examined at bedside. Has no complaints   Eating dinner. /71  On 1 L NC  No edema.  cc   + 8.5 L for admission     Na 138 K 4.5   Bicarb 21   Cr 1.2        Plan:  BP acceptable  Cr trending down. Cr 1.2   Stop IVFs. Taking PO                   Assessment :     Acute Kidney Injury  Creatinine peaked to 2.3-2 on consult  Creatinine hxguvxgm-0-8.3  She is known to have CKD 2 baseline-likely due to nephrectomy, recurrent SHARRON, diabetes  She has a history of Wilms tumor and nephrectomy  Had SHARRON on 2/21-seen by me again. Her protein creatinine ratio was normal  She also has diabetes mellitus on insulin-which is a risk factor for CKD         Hypertension   BP: (124-152)/(56-71)  Heart Rate:  [73-91]   BP goal inpatient [phone] systolic inpatient  Blood pressure is relatively soft  She is also known to have atrial fibrillation        [facility] would like to thank Garrick Bumpers, DO   for opportunity to serve this patient      Please call with questions at-   24 Hrs Answering service [phone] or  7 am- 5 pm via Perfect serve or cell phone  Leonides Bryson MD          CC/reason for consult :     SHARRON     HPI :     Carlos Oconnor is a 60 y.o. female presented to   the hospital on 2/26/2023 with altered mental status. She was unresponsive on presentation. She was getting bowel preparation for colonoscopy and fell in the bathroom preceded by dizziness weakness and swelling. She also lost her consciousness. She was taking short acting insulin in addition to Lantus despite of being NPO. She is known to have diabetes on insulin. EMS was called. She was found to have blood sugar of 40 which was treated in route and she was brought to the emergency room. She had a colonoscopy done inpatient and had a biopsy done which showed lesion in the hepatic flexure came back positive for colon cancer.   She had a colon surgery done this hospitalization  Her course of hospitalization is complicated by SHARRON. ROS:         positives in bold   Constitutional:  fever, chills, weakness, weight change, fatigue  Skin:  rash, pruritus, hair loss, bruising, dry skin, petechiae  Head, Face, Neck   headaches, swelling,  cervical adenopathy  Respiratory: shortness of breath, cough, or wheezing  Cardiovascular: chest pain, palpitations, dizzy, edema  Gastrointestinal: nausea, vomiting, diarrhea, constipation,belly pain    Yellow skin, blood in stool  Musculoskeletal:  back pain, muscle weakness, gait problems,       joint pain or swelling. Genitourinary:  dysuria, poor urine flow, flank pain, blood in urine  Neurologic:  vertigo, TIA'S, syncope, seizures, focal weakness  Psychosocial:  insomnia, anxiety, or depression. Additional positive findings:                    All other remaining systems are negative or unable to obtain        PMH/PSH/SH/Family History:     Past Medical History:   Diagnosis Date    Abnormal brain MRI 7/20/2017    Partially empty sella and minimal chronic small vessel ischemic disease    Acute bilateral low back pain without sciatica 11/2/2016    SHARRON (acute kidney injury) ([facility] 7/5/2017    Arthritis     back    Bipolar disorder ([address] 10/18/2008    CAD (coronary artery disease)     stent placed 6/8/20    Cancer ([facility] 2015    bilateral breast:s/p lumpectomy/radiation:under care care of breast specialist:Dr. Boone     Carotid stenosis, bilateral:<50%:per US 7/2016 7/15/2016    Carpal tunnel syndrome 10/18/2008    Cervical cancer screening 2014    Nml per pt'.     Coronary artery disease of native artery of native heart with stable angina pectoris ([facility] 6/9/2020    DDD (degenerative disc disease), lumbar 7/18/2018    Depression     under care of pschiatrist:Dr. 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 myelopathy or radiculopathy 3/10/2017    Transient cerebral ischemia 07/15/2016    TIA:7/10/16    Unspecified cerebral artery occlusion with cerebral infarction     TIA       Past Surgical History:   Procedure Laterality Date    BREAST LUMPECTOMY  2015    Bilateral:breast cancer    CARDIAC CATHETERIZATION  06/08/2020    Dr. Marissa Vela), DAYANA to Diag 1    COLONOSCOPY N/A 2/1/2021    COLONOSCOPY DIAGNOSTIC performed by Marina Mauro MD at [address] [facility] N/A 2/27/2023    COLONOSCOPY WITH BIOPSY performed by Marina Mauro MD at [place] [address]  2/3/2021    CT BONE MARROW BIOPSY 2/3/2021 Morgan Sousa MD [id number] [place] [address] CT SCAN    HEMICOLECTOMY N/A 3/7/2023    ROBOTIC CONVERTED TO OPEN RIGHT COLECTOMY performed by Loi Alarcon MD at [address] ([address] [facility])      Benign:no cervical cancer per pt'    KIDNEY REMOVAL      right    OTHER SURGICAL HISTORY Right     orif right ankle    TEMPORAL ARTERY BIOPSY Right 8/9/2021    RIGHT TEMPORAL ARTERY BIOPSY LIGATION performed by Vonda Sandifer, MD at [address] [facility] 1/29/2021    EGD BIOPSY performed by Marina Mauro MD at [facility] 12/15/2022    EGD BIOPSY performed by Marina Mauro MD at [address] [facility]        reports that she quit smoking about 8 years ago. Her smoking use included cigarettes and cigars.  She has a 10.00 pack-year smoking history. She has never used smokeless tobacco. She reports that she does not drink alcohol and does not use drugs. family history includes Cancer in her father and mother. Medication:     Current Facility-Administered Medications: 0.9 % sodium chloride infusion, , IntraVENous, PRN  insulin lispro (HUMALOG) injection vial 0-16 Units, 0-16 Units, SubCUTAneous, TID WC  insulin lispro (HUMALOG) injection vial 0-4 Units, 0-4 Units, SubCUTAneous, Nightly  enoxaparin (LOVENOX) injection 40 mg, 40 mg, SubCUTAneous, Daily  amLODIPine (NORVASC) tablet 5 mg, 5 mg, Oral, Daily  insulin glargine (LANTUS) injection vial 10 Units, 10 Units, SubCUTAneous, Daily  traMADol (ULTRAM) tablet 50 mg, 50 mg, Oral, Q6H PRN  metronidazole (FLAGYL) 500 mg in 0.9% NaCl 100 mL IVPB premix, 500 mg, IntraVENous, Q8H  0.9 % sodium chloride infusion, , IntraVENous, Continuous  ceFAZolin (ANCEF) 2000 mg in 0.9% sodium chloride 100 mL IVPB, 2,000 mg, IntraVENous, Q12H  acetaminophen (TYLENOL) tablet 650 mg, 650 mg, Oral, Q4H PRN **OR** acetaminophen (TYLENOL) suppository 650 mg, 650 mg, Rectal, Q4H PRN  insulin regular (HUMULIN R;NOVOLIN R) injection 10 Units, 10 Units, SubCUTAneous, Once  hydrALAZINE (APRESOLINE) injection 5 mg, 5 mg, IntraVENous, Q4H PRN  glucose chewable tablet 16 g, 4 tablet, Oral, PRN  dextrose bolus 10% 125 mL, 125 mL, IntraVENous, PRN **OR** dextrose bolus 10% 250 mL, 250 mL, IntraVENous, PRN  glucagon (rDNA) injection 1 mg, 1 mg, IntraMUSCular, PRN  dextrose 10 % infusion, , IntraVENous, Continuous PRN  aspirin EC tablet 81 mg, 81 mg, Oral, Daily  atorvastatin (LIPITOR) tablet 20 mg, 20 mg, Oral, Daily  clonazePAM (KLONOPIN) tablet 0.5 mg, 0.5 mg, Oral, TID PRN  calcium carb-cholecalciferol 250-3. 125 MG-MCG per tab 1 tablet, 1 tablet, Oral, Daily  [Held by provider] gabapentin (NEURONTIN) capsule 600 mg, 600 mg, Oral, TID  paliperidone (INVEGA) extended release tablet 9 mg, 9 mg, Oral, QAM  pantoprazole (PROTONIX) tablet 40 mg, 40 mg, Oral, Daily  [Held by provider] traZODone (DESYREL) tablet 200 mg, 200 mg, Oral, Nightly  sodium chloride flush 0.9 % injection 5-40 mL, 5-40 mL, IntraVENous, 2 times per day  sodium chloride flush 0.9 % injection 5-40 mL, 5-40 mL, IntraVENous, PRN  0.9 % sodium chloride infusion, , IntraVENous, PRN  ondansetron (ZOFRAN-ODT) disintegrating tablet 4 mg, 4 mg, Oral, Q8H PRN **OR** ondansetron (ZOFRAN) injection 4 mg, 4 mg, IntraVENous, Q6H PRN  polyethylene glycol (GLYCOLAX) packet 17 g, 17 g, Oral, Daily PRN       Vitals :     Vitals:    03/12/23 1337   BP: (!) 152/71   Pulse: 73   Resp: 16   Temp: 98 °F (36.7 °C)   SpO2: 97%       I & O :       Intake/Output Summary (Last 24 hours) at 3/12/2023 1638  Last data filed at 3/12/2023 1422  Gross per 24 hour   Intake 1978.52 ml   Output 1590 ml   Net 388.52 ml        Physical Examination :     General appearance:  in NAD, fully alert and oriented. Comfortable. HEENT: EOM intact, no icterus. Trachea is midline. Neck : No mass, appears symmetrical, no JVD   Respiratory: Respiratory effort RR no edema  Abdomen: No visible mass or tenderness  Musculoskeletal:  No clubbing,cyanosis, joints with no swelling or deformity. Skin:no rashes, ulcers, induration, no jaundice. Neuro: face symmetric, no focal deficits. Appropriate responses.  CN 2-12 grossly intact         LABS:     Recent Labs     03/11/23  1210 03/12/23  0515   WBC 6.8 4.8   HGB 7.1* 6.2*   HCT 23.8* 19.9*    164     Recent Labs     03/10/23  0521 03/11/23  0443 03/11/23  1210 03/12/23  0515    139  --  138   K 3.8 3.7  --  4.5    106  --  111*   CO2 25 24  --  21   BUN 33* 30*  --  21*   CREATININE 1.5* 1.4*  --  1.2   GLUCOSE 64* 273*  --  347*   MG  --   --  2.50*  --    PHOS  --   --  1.5* 2.7            Thanks,   [facility]  [address]  Office: [phone]  Fax: [phone]

## 2023-03-12 NOTE — PROGRESS NOTES
VSS, afebrile. Oxygen at 94-96% with 2L nasal cannula. Alert and oriented x4, drowsy at times. Denies nausea. Given PRN tylenol x1 for pain. Abdominal incisions C/D/I. Loose BM x1 this shift. Dooley with adequate output. Antibiotics infused without complications. Up x1, walker and gaitbelt to [facility]. Bed in lowest position, bed alarm in place, belongings within reach, non skid sock in place.

## 2023-03-13 LAB
ANION GAP SERPL CALCULATED.3IONS-SCNC: 5 MMOL/L (ref 3–16)
BASOPHILS ABSOLUTE: 0 K/UL (ref 0–0.2)
BASOPHILS RELATIVE PERCENT: 0.2 %
BUN BLDV-MCNC: 13 MG/DL (ref 7–20)
CALCIUM SERPL-MCNC: 7.7 MG/DL (ref 8.3–10.6)
CHLORIDE BLD-SCNC: 110 MMOL/L (ref 99–110)
CO2: 21 MMOL/L (ref 21–32)
CREAT SERPL-MCNC: 1 MG/DL (ref 0.6–1.2)
EOSINOPHILS ABSOLUTE: 0.1 K/UL (ref 0–0.6)
EOSINOPHILS RELATIVE PERCENT: 1.3 %
GFR SERPL CREATININE-BSD FRML MDRD: >60 ML/MIN/{1.73_M2}
GLUCOSE BLD-MCNC: 179 MG/DL (ref 70–99)
GLUCOSE BLD-MCNC: 248 MG/DL (ref 70–99)
GLUCOSE BLD-MCNC: 259 MG/DL (ref 70–99)
GLUCOSE BLD-MCNC: 263 MG/DL (ref 70–99)
GLUCOSE BLD-MCNC: 319 MG/DL (ref 70–99)
HCT VFR BLD CALC: 26 % (ref 36–48)
HEMOGLOBIN: 8.4 G/DL (ref 12–16)
LYMPHOCYTES ABSOLUTE: 1.1 K/UL (ref 1–5.1)
LYMPHOCYTES RELATIVE PERCENT: 21 %
MAGNESIUM: 1.9 MG/DL (ref 1.8–2.4)
MCH RBC QN AUTO: 29.1 PG (ref 26–34)
MCHC RBC AUTO-ENTMCNC: 32.4 G/DL (ref 31–36)
MCV RBC AUTO: 89.6 FL (ref 80–100)
MONOCYTES ABSOLUTE: 0.8 K/UL (ref 0–1.3)
MONOCYTES RELATIVE PERCENT: 14.8 %
NEUTROPHILS ABSOLUTE: 3.2 K/UL (ref 1.7–7.7)
NEUTROPHILS RELATIVE PERCENT: 62.7 %
PDW BLD-RTO: 14.7 % (ref 12.4–15.4)
PERFORMED ON: ABNORMAL
PHOSPHORUS: 1.5 MG/DL (ref 2.5–4.9)
PLATELET # BLD: 172 K/UL (ref 135–450)
PMV BLD AUTO: 8.8 FL (ref 5–10.5)
POTASSIUM REFLEX MAGNESIUM: 4.5 MMOL/L (ref 3.5–5.1)
RBC # BLD: 2.9 M/UL (ref 4–5.2)
SODIUM BLD-SCNC: 136 MMOL/L (ref 136–145)
WBC # BLD: 5.2 K/UL (ref 4–11)

## 2023-03-13 PROCEDURE — 6370000000 HC RX 637 (ALT 250 FOR IP): Performed by: SURGERY

## 2023-03-13 PROCEDURE — 6370000000 HC RX 637 (ALT 250 FOR IP): Performed by: NURSE PRACTITIONER

## 2023-03-13 PROCEDURE — 36415 COLL VENOUS BLD VENIPUNCTURE: CPT

## 2023-03-13 PROCEDURE — 6370000000 HC RX 637 (ALT 250 FOR IP): Performed by: INTERNAL MEDICINE

## 2023-03-13 PROCEDURE — 1200000000 HC SEMI PRIVATE

## 2023-03-13 PROCEDURE — 83735 ASSAY OF MAGNESIUM: CPT

## 2023-03-13 PROCEDURE — 6360000002 HC RX W HCPCS: Performed by: INTERNAL MEDICINE

## 2023-03-13 PROCEDURE — 80048 BASIC METABOLIC PNL TOTAL CA: CPT

## 2023-03-13 PROCEDURE — 85025 COMPLETE CBC W/AUTO DIFF WBC: CPT

## 2023-03-13 PROCEDURE — 84100 ASSAY OF PHOSPHORUS: CPT

## 2023-03-13 PROCEDURE — 97116 GAIT TRAINING THERAPY: CPT

## 2023-03-13 PROCEDURE — APPSS30 APP SPLIT SHARED TIME 16-30 MINUTES: Performed by: CLINICAL NURSE SPECIALIST

## 2023-03-13 PROCEDURE — 2580000003 HC RX 258: Performed by: SURGERY

## 2023-03-13 PROCEDURE — 2500000003 HC RX 250 WO HCPCS: Performed by: SURGERY

## 2023-03-13 PROCEDURE — 97110 THERAPEUTIC EXERCISES: CPT

## 2023-03-13 RX ORDER — INSULIN GLARGINE 100 [IU]/ML
15 INJECTION, SOLUTION SUBCUTANEOUS DAILY
Status: DISCONTINUED | OUTPATIENT
Start: 2023-03-14 | End: 2023-03-14

## 2023-03-13 RX ADMIN — ASPIRIN 81 MG: 81 TABLET, COATED ORAL at 08:32

## 2023-03-13 RX ADMIN — TRAMADOL HYDROCHLORIDE 50 MG: 50 TABLET, COATED ORAL at 08:32

## 2023-03-13 RX ADMIN — INSULIN LISPRO 4 UNITS: 100 INJECTION, SOLUTION INTRAVENOUS; SUBCUTANEOUS at 08:37

## 2023-03-13 RX ADMIN — PANTOPRAZOLE SODIUM 40 MG: 40 TABLET, DELAYED RELEASE ORAL at 08:32

## 2023-03-13 RX ADMIN — ACETAMINOPHEN 325MG 650 MG: 325 TABLET ORAL at 21:34

## 2023-03-13 RX ADMIN — SODIUM CHLORIDE, PRESERVATIVE FREE 10 ML: 5 INJECTION INTRAVENOUS at 21:28

## 2023-03-13 RX ADMIN — ACETAMINOPHEN 325MG 650 MG: 325 TABLET ORAL at 10:26

## 2023-03-13 RX ADMIN — INSULIN GLARGINE 10 UNITS: 100 INJECTION, SOLUTION SUBCUTANEOUS at 08:37

## 2023-03-13 RX ADMIN — METRONIDAZOLE 500 MG: 500 INJECTION, SOLUTION INTRAVENOUS at 06:33

## 2023-03-13 RX ADMIN — ENOXAPARIN SODIUM 40 MG: 100 INJECTION SUBCUTANEOUS at 08:34

## 2023-03-13 RX ADMIN — AMLODIPINE BESYLATE 5 MG: 5 TABLET ORAL at 08:33

## 2023-03-13 RX ADMIN — PALIPERIDONE 9 MG: 3 TABLET, EXTENDED RELEASE ORAL at 08:43

## 2023-03-13 RX ADMIN — SODIUM CHLORIDE 25 ML: 9 INJECTION, SOLUTION INTRAVENOUS at 06:32

## 2023-03-13 RX ADMIN — Medication 1 TABLET: at 08:32

## 2023-03-13 RX ADMIN — INSULIN LISPRO 12 UNITS: 100 INJECTION, SOLUTION INTRAVENOUS; SUBCUTANEOUS at 16:33

## 2023-03-13 RX ADMIN — ATORVASTATIN CALCIUM 20 MG: 10 TABLET, FILM COATED ORAL at 08:32

## 2023-03-13 RX ADMIN — TRAMADOL HYDROCHLORIDE 50 MG: 50 TABLET, COATED ORAL at 16:32

## 2023-03-13 RX ADMIN — TRAMADOL HYDROCHLORIDE 50 MG: 50 TABLET, COATED ORAL at 00:09

## 2023-03-13 ASSESSMENT — PAIN DESCRIPTION - LOCATION
LOCATION: HEAD
LOCATION: ABDOMEN

## 2023-03-13 ASSESSMENT — PAIN SCALES - GENERAL
PAINLEVEL_OUTOF10: 7
PAINLEVEL_OUTOF10: 6
PAINLEVEL_OUTOF10: 7
PAINLEVEL_OUTOF10: 10
PAINLEVEL_OUTOF10: 8

## 2023-03-13 ASSESSMENT — PAIN DESCRIPTION - ORIENTATION: ORIENTATION: MID

## 2023-03-13 ASSESSMENT — PAIN DESCRIPTION - DESCRIPTORS
DESCRIPTORS: ACHING

## 2023-03-13 ASSESSMENT — PAIN SCALES - WONG BAKER
WONGBAKER_NUMERICALRESPONSE: 0

## 2023-03-13 ASSESSMENT — PAIN - FUNCTIONAL ASSESSMENT: PAIN_FUNCTIONAL_ASSESSMENT: ACTIVITIES ARE NOT PREVENTED

## 2023-03-13 NOTE — PROGRESS NOTES
Interval History and plan: She was seen and examined in the bedside  Has no new complaints  Eating and drinking good  Has No edema  Blood pressure is good  Off of IV fluids  Her creatinine is 1 now and EGFR of more than 60%      Plan:  She has CKD stage II at baseline due to nephrectomy  Will need regular follow-up and optimize acute to avoid    Dialysis/progression of renal failure in the future  Explained that to her  We will follow-up in the office after discharge                     Assessment :     Acute Kidney Injury  Creatinine peaked to 2.3-2 on consult  Creatinine qnwsewas-9-9.3  She is known to have CKD 2 baseline-likely due to nephrectomy, recurrent SHARRON, diabetes  She has a history of Wilms tumor and nephrectomy  Had SHARRON on 2/21-seen by me again. Her protein creatinine ratio was normal  She also has diabetes mellitus on insulin-which is a risk factor for CKD         Hypertension   BP: (134)/(71)  Heart Rate:  [75]   BP goal inpatient [phone] systolic inpatient  Blood pressure is relatively soft  She is also known to have atrial fibrillation        [facility] would like to thank Bridger Murcia MD   for opportunity to serve this patient      Please call with questions at-   24 Hrs Answering service [phone] or  7 am- 5 pm via Perfect serve or cell phone  Michelle Russo MD          CC/reason for consult :     SHARRON     HPI :     Magy Freeman is a 60 y.o. female presented to   the hospital on 2/26/2023 with altered mental status. She was unresponsive on presentation. She was getting bowel preparation for colonoscopy and fell in the bathroom preceded by dizziness weakness and swelling. She also lost her consciousness. She was taking short acting insulin in addition to Lantus despite of being NPO. She is known to have diabetes on insulin. EMS was called. She was found to have blood sugar of 40 which was treated in route and she was brought to the emergency room.   She had a colonoscopy done inpatient and had a biopsy done which showed lesion in the hepatic flexure came back positive for colon cancer. She had a colon surgery done this hospitalization  Her course of hospitalization is complicated by SHARRON. ROS:         positives in bold   Constitutional:  fever, chills, weakness, weight change, fatigue  Skin:  rash, pruritus, hair loss, bruising, dry skin, petechiae  Head, Face, Neck   headaches, swelling,  cervical adenopathy  Respiratory: shortness of breath, cough, or wheezing  Cardiovascular: chest pain, palpitations, dizzy, edema  Gastrointestinal: nausea, vomiting, diarrhea, constipation,belly pain    Yellow skin, blood in stool  Musculoskeletal:  back pain, muscle weakness, gait problems,       joint pain or swelling. Genitourinary:  dysuria, poor urine flow, flank pain, blood in urine  Neurologic:  vertigo, TIA'S, syncope, seizures, focal weakness  Psychosocial:  insomnia, anxiety, or depression. Additional positive findings:                    All other remaining systems are negative or unable to obtain        PMH/PSH/SH/Family History:     Past Medical History:   Diagnosis Date    Abnormal brain MRI 7/20/2017    Partially empty sella and minimal chronic small vessel ischemic disease    Acute bilateral low back pain without sciatica 11/2/2016    SHARRON (acute kidney injury) ([address] 7/5/2017    Arthritis     back    Bipolar disorder ([address] 10/18/2008    CAD (coronary artery disease)     stent placed 6/8/20    Cancer ([address] 2015    bilateral breast:s/p lumpectomy/radiation:under care care of breast specialist:Dr. Boone     Carotid stenosis, bilateral:<50%:per US 7/2016 7/15/2016    Carpal tunnel syndrome 10/18/2008    Cervical cancer screening 2014    Nml per pt'.     Coronary artery disease of native artery of native heart with stable angina pectoris ([address] 6/9/2020    DDD (degenerative disc disease), lumbar 7/18/2018    Depression     under care of pschiatrist:Dr. 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 myelopathy or radiculopathy 3/10/2017    Transient cerebral ischemia 07/15/2016    TIA:7/10/16    Unspecified cerebral artery occlusion with cerebral infarction     TIA       Past Surgical History:   Procedure Laterality Date    BREAST LUMPECTOMY  2015    Bilateral:breast cancer    CARDIAC CATHETERIZATION  06/08/2020    Dr. Gregg Jaramillo), DAYANA to Diag 1    COLONOSCOPY N/A 2/1/2021    COLONOSCOPY DIAGNOSTIC performed by Marie Hernandez MD at [address] [facility] N/A 2/27/2023    COLONOSCOPY WITH BIOPSY performed by Marie Hernandez MD at [place] [address]  2/3/2021    CT BONE MARROW BIOPSY 2/3/2021 Lucia Sahni MD [address] [facility] CT SCAN    HEMICOLECTOMY N/A 3/7/2023    ROBOTIC CONVERTED TO OPEN RIGHT COLECTOMY performed by Elana Johnson MD at [address] [facility] ([address] [facility])      Benign:no cervical cancer per pt'    KIDNEY REMOVAL      right    OTHER SURGICAL HISTORY Right     orif right ankle    TEMPORAL ARTERY BIOPSY Right 8/9/2021    RIGHT TEMPORAL ARTERY BIOPSY LIGATION performed by Thom Tapia MD at [address] [place]  1/29/2021    EGD BIOPSY performed by Marie Hernandez MD at [address] [facility] N/A 12/15/2022 EGD BIOPSY performed by Bharat Taylor MD at [address]        reports that she quit smoking about 8 years ago. Her smoking use included cigarettes and cigars. She has a 10.00 pack-year smoking history. She has never used smokeless tobacco. She reports that she does not drink alcohol and does not use drugs. family history includes Cancer in her father and mother. Medication:     Current Facility-Administered Medications: 0.9 % sodium chloride infusion, , IntraVENous, PRN  insulin lispro (HUMALOG) injection vial 0-16 Units, 0-16 Units, SubCUTAneous, TID WC  insulin lispro (HUMALOG) injection vial 0-4 Units, 0-4 Units, SubCUTAneous, Nightly  enoxaparin (LOVENOX) injection 40 mg, 40 mg, SubCUTAneous, Daily  amLODIPine (NORVASC) tablet 5 mg, 5 mg, Oral, Daily  insulin glargine (LANTUS) injection vial 10 Units, 10 Units, SubCUTAneous, Daily  traMADol (ULTRAM) tablet 50 mg, 50 mg, Oral, Q6H PRN  acetaminophen (TYLENOL) tablet 650 mg, 650 mg, Oral, Q4H PRN **OR** acetaminophen (TYLENOL) suppository 650 mg, 650 mg, Rectal, Q4H PRN  insulin regular (HUMULIN R;NOVOLIN R) injection 10 Units, 10 Units, SubCUTAneous, Once  hydrALAZINE (APRESOLINE) injection 5 mg, 5 mg, IntraVENous, Q4H PRN  glucose chewable tablet 16 g, 4 tablet, Oral, PRN  dextrose bolus 10% 125 mL, 125 mL, IntraVENous, PRN **OR** dextrose bolus 10% 250 mL, 250 mL, IntraVENous, PRN  glucagon (rDNA) injection 1 mg, 1 mg, IntraMUSCular, PRN  dextrose 10 % infusion, , IntraVENous, Continuous PRN  aspirin EC tablet 81 mg, 81 mg, Oral, Daily  atorvastatin (LIPITOR) tablet 20 mg, 20 mg, Oral, Daily  clonazePAM (KLONOPIN) tablet 0.5 mg, 0.5 mg, Oral, TID PRN  calcium carb-cholecalciferol 250-3. 125 MG-MCG per tab 1 tablet, 1 tablet, Oral, Daily  [Held by provider] gabapentin (NEURONTIN) capsule 600 mg, 600 mg, Oral, TID  paliperidone (INVEGA) extended release tablet 9 mg, 9 mg, Oral, QAM  pantoprazole (PROTONIX) tablet 40 mg, 40 mg, Oral, Daily  [Held by provider] traZODone (DESYREL) tablet 200 mg, 200 mg, Oral, Nightly  sodium chloride flush 0.9 % injection 5-40 mL, 5-40 mL, IntraVENous, 2 times per day  sodium chloride flush 0.9 % injection 5-40 mL, 5-40 mL, IntraVENous, PRN  0.9 % sodium chloride infusion, , IntraVENous, PRN  ondansetron (ZOFRAN-ODT) disintegrating tablet 4 mg, 4 mg, Oral, Q8H PRN **OR** ondansetron (ZOFRAN) injection 4 mg, 4 mg, IntraVENous, Q6H PRN  polyethylene glycol (GLYCOLAX) packet 17 g, 17 g, Oral, Daily PRN       Vitals :     Vitals:    03/13/23 0833   BP: 134/71   Pulse:    Resp:    Temp:    SpO2:        I & O :       Intake/Output Summary (Last 24 hours) at 3/13/2023 0954  Last data filed at 3/13/2023 0819  Gross per 24 hour   Intake 2129.5 ml   Output 1460 ml   Net 669.5 ml          Physical Examination :     General appearance:  in NAD, fully alert and oriented. Comfortable. HEENT: EOM intact, no icterus. Trachea is midline. Neck : No mass, appears symmetrical, no JVD   Respiratory: Respiratory effort RR no edema  Abdomen: No visible mass or tenderness  Musculoskeletal:  No clubbing,cyanosis, joints with no swelling or deformity. Skin:no rashes, ulcers, induration, no jaundice. Neuro: face symmetric, no focal deficits. Appropriate responses.  CN 2-12 grossly intact         LABS:     Recent Labs     03/11/23  1210 03/12/23  0515 03/12/23  1732 03/13/23  0514   WBC 6.8 4.8  --  5.2   HGB 7.1* 6.2* 8.8* 8.4*   HCT 23.8* 19.9* 27.3* 26.0*    164  --  172       Recent Labs     03/11/23  0443 03/11/23  1210 03/12/23  0515 03/13/23  0514     --  138 136   K 3.7  --  4.5 4.5     --  111* 110   CO2 24  --  21 21   BUN 30*  --  21* 13   CREATININE 1.4*  --  1.2 1.0   GLUCOSE 273*  --  347* 259*   MG  --  2.50*  --  1.90   PHOS  --  1.5* 2.7 1.5*              Thanks,   [facility]  [address]  Office: [phone]  Fax: [phone]

## 2023-03-13 NOTE — PROGRESS NOTES
VSS, afebrile. Oxygen at 90-92 on room air. Alert and oriented x4. Denies nausea. PRN pain med given with some relief. Abdominal incisions C/D/I. No BM this shift. Dooley with adequate output. HEATHER with some output  Antibiotics infused without complications. Up x1, walker and gaitbelt to [facility]. Bed in lowest position, bed alarm in place, belongings within reach, non skid sock in place.

## 2023-03-13 NOTE — PROGRESS NOTES
Hospitalist Progress Note      PCP: Mayela Magallanes    Date of Admission: 2/26/2023    Chief Complaint:   Hypoglycemia     Subjective:    NAD, pain controlled, mentation improved, afebrile    Medications:  Reviewed    Infusion Medications    sodium chloride      dextrose      sodium chloride 25 mL (03/13/23 2176)     Scheduled Medications    insulin lispro  0-16 Units SubCUTAneous TID WC    insulin lispro  0-4 Units SubCUTAneous Nightly    enoxaparin  40 mg SubCUTAneous Daily    amLODIPine  5 mg Oral Daily    insulin glargine  10 Units SubCUTAneous Daily    insulin regular  10 Units SubCUTAneous Once    aspirin  81 mg Oral Daily    atorvastatin  20 mg Oral Daily    calcium carb-cholecalciferol  1 tablet Oral Daily    [Held by provider] gabapentin  600 mg Oral TID    paliperidone  9 mg Oral QAM    pantoprazole  40 mg Oral Daily    [Held by provider] traZODone  200 mg Oral Nightly    sodium chloride flush  5-40 mL IntraVENous 2 times per day     PRN Meds: sodium chloride, traMADol, acetaminophen **OR** acetaminophen, hydrALAZINE, glucose, dextrose bolus **OR** dextrose bolus, glucagon (rDNA), dextrose, clonazePAM, sodium chloride flush, sodium chloride, ondansetron **OR** ondansetron, polyethylene glycol      Intake/Output Summary (Last 24 hours) at 3/13/2023 1126  Last data filed at 3/13/2023 0819  Gross per 24 hour   Intake 1889.5 ml   Output 1460 ml   Net 429.5 ml         Physical Exam Performed:    /67   Pulse 81   Temp 98.1 °F (36.7 °C) (Oral)   Resp 18   Ht 5' 3\" (1.6 m)   Wt 123 lb (55.8 kg)   SpO2 95%   BMI 21.79 kg/m²     General appearance: No apparent distress, appears stated age and cooperative. HEENT: Pupils equal, round, and reactive to light. Conjunctivae/corneas clear. Neck: Supple, with full range of motion. No jugular venous distention. Trachea midline. Respiratory:  Normal respiratory effort. Clear to auscultation, bilaterally without Rales/Wheezes/Rhonchi.   Cardiovascular: Regular rate and rhythm with normal S1/S2 without murmurs, rubs or gallops. Abdomen: Soft, nontender. Midline incision intact Positive bowel sounds. Musculoskeletal: No clubbing, cyanosis or edema bilaterally. Full range of motion without deformity. Skin: Skin color, texture, turgor normal.  No rashes or lesions. Neurologic:  Neurovascularly intact without any focal sensory/motor deficits. Cranial nerves: II-XII intact, grossly non-focal.  Psychiatric: Alert, mood stable  Capillary Refill: Brisk, 3 seconds, normal   Peripheral Pulses: +2 palpable, equal bilaterally       Labs:   Recent Labs     03/11/23  1210 03/12/23  0515 03/12/23  1732 03/13/23  0514   WBC 6.8 4.8  --  5.2   HGB 7.1* 6.2* 8.8* 8.4*   HCT 23.8* 19.9* 27.3* 26.0*    164  --  172       Recent Labs     03/11/23  0443 03/11/23  1210 03/12/23  0515 03/13/23  0514     --  138 136   K 3.7  --  4.5 4.5     --  111* 110   CO2 24  --  21 21   BUN 30*  --  21* 13   CREATININE 1.4*  --  1.2 1.0   CALCIUM 8.1*  --  7.5* 7.7*   PHOS  --  1.5* 2.7 1.5*       No results for input(s): AST, ALT, BILIDIR, BILITOT, ALKPHOS in the last 72 hours. No results for input(s): INR in the last 72 hours. No results for input(s): Jennifer [place] in the last 72 hours. Urinalysis:      Lab Results   Component Value Date/Time    NITRU Negative 03/11/2023 01:23 PM    WBCUA 0-2 03/11/2023 01:23 PM    BACTERIA 2+ 12/10/2022 01:42 PM    RBCUA 0-2 03/11/2023 01:23 PM    BLOODU SMALL 03/11/2023 01:23 PM    SPECGRAV 1.015 03/11/2023 01:23 PM    GLUCOSEU 250 03/11/2023 01:23 PM    GLUCOSEU >=1000 mg/dL 06/07/2010 03:38 PM       Radiology:  XR CHEST (2 VW)   Final Result   Bibasilar pulmonary opacities could represent atelectasis or infection         XR CHEST PORTABLE   Final Result   No evidence of edema or pneumonia. Mild cardiac silhouette enlargement. CT ABDOMEN PELVIS WO CONTRAST Additional Contrast? None   Final Result   1.   No acute abdominal or pelvic findings. 2.  Large amount of formed stool throughout the colon. Correlate with any   evidence of constipation. XR ABDOMEN (KUB) (SINGLE AP VIEW)   Final Result   1. Moderate stool in the colon, correlate with signs of constipation. US RETROPERITONEAL COMPLETE   Final Result   Left kidney is unremarkable         CT HEAD WO CONTRAST   Final Result   No acute intracranial abnormality. Mild cerebral white matter chronic microvascular ischemic disease. Chronic right maxillary sinus opacification in setting of chronic sinusitis. 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 Patient was apparently found unresponsive on the toilet while doing her bowel prep for a colonoscopy procedure scheduled tomorrow with [facility]. She also supposed to get an EGD. Patient reports she remembers getting diaphoretic and weak just prior to using the bathroom. And after she sat on the toilet she became very lightheaded and lost her consciousness. She reports she takes 22 units of Lantus in the morning which she did. Was instructed not to eat anything so she drank some water and Gatorade in the morning. She also proceeded to take 8 units of the short acting in the morning.   Patient reports she is not aware that she is not supposed to take the short acting insulin with a meal if she was not going to eat anything for the meal.  She has been on insulin and a diabetic for about 10 years. EMS was called, and they reported her blood sugar was 40 at the scene. \"    Colon Cancer: New diagnosis. Weight loss, abdominal pain, constipation and elevated CEA. Colonoscopy 2/27, pathology report now finalized and showed invasive Adenocarcinoma. S/p Colectomy 3/7. Pathology suggested localized disease; per Oncology, may not need additional chemotherapy. NGT out. Advance diet as tolerated. Pain control. Anemia 2/2 post op state, colon ca-transfuse PRBC's <7. 1 unit transfused 3/12. Hgb 8.4 today    Hypoglycemia due to insulin use with decreased p.o. intake. Blood sugars were labile pre-op and patient switched to NPH. Now with low BS as diet being advanced slowly. Will resume lower dose Lantus with SSI and monitor. SHARRON with acidosis. 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 Patient was on a bicarb drip. Improved but then worsened again last few days post-op. Continue hydration. Nephrology following. Monitor renal function. Hypertension. Blood pressure labile. PO meds on hold while NPO. Monitor. Somnolent yest, more alert this am, wbc 4.8, h&h 7.1->6.2, LA 1.6, PCT 20.02->13.06, CXR: Bibasilar pulmonary opacities could represent atelectasis or infection, temp 100.5. repeat UA-stable, Current abx:Cefazolin/Flagyl. Hold gabapentin and trazodone. DVT Prophylaxis: Lovenox  Diet: ADULT DIET; Easy to Chew  ADULT ORAL NUTRITION SUPPLEMENT; Breakfast, Lunch, Dinner; Diabetic Oral Supplement  Code Status: Full Code  PT/OT Eval Status: patient ambulatory at home. Therapy re-ordered post-op. Dispo - >2days.  SNF recs, dc when ok with surgery    Janusz Morgan, SARTHAK - CNP

## 2023-03-13 NOTE — PROGRESS NOTES
[facility] GENERAL SURGERY    Surgery Progress Note           POD # 6    PATIENT NAME: Michael Mejia     TODAY'S DATE: 3/13/2023    INTERVAL HISTORY:    Pt awake and alert - tolerating diet. OBJECTIVE:   VITALS:  /67   Pulse 81   Temp 98.1 °F (36.7 °C) (Oral)   Resp 18   Ht 5' 3\" (1.6 m)   Wt 123 lb (55.8 kg)   SpO2 95%   BMI 21.79 kg/m²     INTAKE/OUTPUT:    I/O last 3 completed shifts: In: 3614.7 [P.O.:1260; I.V.:597.3; Blood:373.3; IV Piggyback:1384.1]  Out: 2680 [Urine:2225; Drains:455]  I/O this shift:  In: 120 [P.O.:120]  Out: 90 [Drains:90]              CONSTITUTIONAL:  awake, alert  LUNGS:  no crackles or wheezing  ABDOMEN:   + bowel sounds, soft, non-distended, tenderness noted midline incision, bryanna drain serous  INCISION: no erythema or drainage, staples intact    Data:  CBC:   Recent Labs     03/11/23  1210 03/12/23  0515 03/12/23  1732 03/13/23  0514   WBC 6.8 4.8  --  5.2   HGB 7.1* 6.2* 8.8* 8.4*   HCT 23.8* 19.9* 27.3* 26.0*    164  --  172       BMP:    Recent Labs     03/11/23  0443 03/12/23  0515 03/13/23  0514    138 136   K 3.7 4.5 4.5    111* 110   CO2 24 21 21   BUN 30* 21* 13   CREATININE 1.4* 1.2 1.0   GLUCOSE 273* 347* 259*       Hepatic: No results for input(s): AST, ALT, ALB, BILITOT, ALKPHOS in the last 72 hours. Mag:      Recent Labs     03/11/23  1210 03/13/23  0514   MG 2.50* 1.90        Phos:     Recent Labs     03/11/23  1210 03/12/23  0515 03/13/23  0514   PHOS 1.5* 2.7 1.5*        INR:   No results for input(s): INR in the last 72 hours.         ASSESSMENT AND PLAN:  60 y.o. female status post Robotic converted to open right colectomy      GI: continue with diet as adam  SHARRON: nephrology managing  Pain: continue with current orders  Activity: PT/OT      Electronically signed by SARTHAK Candelario CNP     I have personally performed a face to face diagnostic evaluation on this patient and agree with the progress note and care plan of [facility] PIETER Cassidy.      Sandoval Black MD

## 2023-03-13 NOTE — PLAN OF CARE
Problem: Pain  Goal: Verbalizes/displays adequate comfort level or baseline comfort level  Outcome: Progressing  Flowsheets (Taken 3/12/2023 2200)  Verbalizes/displays adequate comfort level or baseline comfort level: Encourage patient to monitor pain and request assistance     Problem: Chronic Conditions and Co-morbidities  Goal: Patient's chronic conditions and co-morbidity symptoms are monitored and maintained or improved  Outcome: Progressing  Flowsheets (Taken 3/11/2023 2045)  Care Plan - Patient's Chronic Conditions and Co-Morbidity Symptoms are Monitored and Maintained or Improved: Monitor and assess patient's chronic conditions and comorbid symptoms for stability, deterioration, or improvement     Problem: Gastrointestinal - Adult  Goal: Minimal or absence of nausea and vomiting  Outcome: Progressing  Flowsheets (Taken 3/11/2023 2045)  Minimal or absence of nausea and vomiting:   Administer IV fluids as ordered to ensure adequate hydration   Administer ordered antiemetic medications as needed   Provide nonpharmacologic comfort measures as appropriate  Goal: Maintains or returns to baseline bowel function  Outcome: Progressing  Flowsheets (Taken 3/11/2023 2045)  Maintains or returns to baseline bowel function:   Assess bowel function   Encourage oral fluids to ensure adequate hydration   Administer IV fluids as ordered to ensure adequate hydration   Administer ordered medications as needed   Encourage mobilization and activity  Goal: Maintains adequate nutritional intake  Outcome: Progressing  Flowsheets (Taken 2/28/2023 1600 by Abhijeet Goldstein RN)  Maintains adequate nutritional intake:   Monitor percentage of each meal consumed   Identify factors contributing to decreased intake, treat as appropriate     Problem: Metabolic/Fluid and Electrolytes - Adult  Goal: Glucose maintained within prescribed range  Outcome: Progressing  Flowsheets (Taken 3/11/2023 2045)  Glucose maintained within prescribed range: Monitor blood glucose as ordered   Assess for signs and symptoms of hyperglycemia and hypoglycemia   Administer ordered medications to maintain glucose within target range    Problem: Safety - Adult  Goal: Free from fall injury  Outcome: Progressing  Flowsheets (Taken 3/11/2023 1005 by Mahamed Jeter RN)  Free From Fall Injury: Based on caregiver fall risk screen, instruct family/caregiver to ask for assistance with transferring infant if caregiver noted to have fall risk factors     Problem: Nutrition Deficit:  Goal: Optimize nutritional status  Outcome: Progressing  Flowsheets (Taken 3/10/2023 1031 by Cheryl Charlton, MS, RD, LD)  Nutrient intake appropriate for improving, restoring, or maintaining nutritional needs: Assess nutritional status and recommend course of action     Problem: Cardiovascular - Adult  Goal: Maintains optimal cardiac output and hemodynamic stability  Outcome: Progressing  Flowsheets (Taken 3/11/2023 2045)  Maintains optimal cardiac output and hemodynamic stability:   Monitor blood pressure and heart rate   Monitor urine output and notify Licensed Independent Practitioner for values outside of normal range   Assess for signs of decreased cardiac output   Administer fluid and/or volume expanders as ordered  Goal: Absence of cardiac dysrhythmias or at baseline  Outcome: Progressing     Problem: Hematologic - Adult  Goal: Maintains hematologic stability  Outcome: Progressing  Flowsheets (Taken 3/11/2023 2045)  Maintains hematologic stability: Assess for signs and symptoms of bleeding or hemorrhage     Problem: Skin/Tissue Integrity  Goal: Absence of new skin breakdown  Description: 1. Monitor for areas of redness and/or skin breakdown  2. Assess vascular access sites hourly  3. Every 4-6 hours minimum:  Change oxygen saturation probe site  4.   Every 4-6 hours:  If on nasal continuous positive airway pressure, respiratory therapy assess nares and determine need for appliance change or resting period.  Outcome: Progressing

## 2023-03-13 NOTE — PROGRESS NOTES
Physical Therapy  Facility/Department: [facility] C3 TELE/MED SURG/ONC  Daily Treatment Note  NAME: Carmen Vincent  : 1960  MRN: [id number]    Date of Service: 3/13/2023    Discharge Recommendations:  Subacute/Skilled Nursing Facility   PT Equipment Recommendations  Equipment Needed: No  Other: defer to patient's facility    Patient Diagnosis(es): The primary encounter diagnosis was Hypoglycemia. Diagnoses of Confusion, Screening for colon cancer, and Malignant neoplasm of hepatic flexure ([address] were also pertinent to this visit. Assessment   Assessment: Pt tolerated therapy well this date. Grossly min A for mobility with increased time d/t pain. Cues for safety and posture throughout session. Improved tolerance to gait with RW this date and improved alert level. Pt would benefit from continued skilled therapy to address deficits. Continue to recommend SNF at d/c. Activity Tolerance: Patient tolerated treatment well;Patient limited by fatigue;Patient limited by pain  Equipment Needed: No  Other: defer to patient's facility     Plan    Physcial Therapy Plan  General Plan: 3-5 times per week  Specific Instructions for Next Treatment: progress mobility as tolerated  Current Treatment Recommendations: Strengthening;Balance training;Functional mobility training;Transfer training; Endurance training;Gait training;Stair training;Home exercise program;Safety education & training; Therapeutic activities; Patient/Caregiver education & training;Equipment evaluation, education, & procurement;ADL/Self-care training     Restrictions  Restrictions/Precautions  Restrictions/Precautions: Fall Risk, General Precautions  Position Activity Restriction  Other position/activity restrictions: telemetry, petersen, IV, .5LO2, closed suction drain at abdomen     Subjective    Subjective  Subjective: Pt agreeable to therapy  Pain: Reports 10/10 abdominal pain and 7/10 headache.  RN notified     Objective   Vitals  Heart Rate: 81  BP: 129/67  Patient Position: Up in chair  MAP (Calculated): 88  SpO2: 95 %  O2 Device: None (Room air)    Bed Mobility Training  Bed Mobility Training: Yes  Interventions: Safety awareness training; Tactile cues; Verbal cues  Rolling: Contact-guard assistance  Supine to Sit: Minimum assistance (HOB elevated, use of rails, cues for log rolling)  Sit to Supine: Other (comment) (pt up in chair at end of session)    Balance  Sitting: Intact  Standing: Impaired  Standing - Static: Fair  Standing - Dynamic: Fair    Transfer Training  Transfer Training: Yes  Overall Level of Assistance: Minimum assistance  Interventions: Safety awareness training; Tactile cues; Verbal cues  Sit to Stand: Minimum assistance  Stand to Sit: Contact-guard assistance    Gait Training  Gait Training: Yes  Gait  Overall Level of Assistance: Minimum assistance  Interventions: Safety awareness training; Tactile cues; Verbal cues  Base of Support: Center of gravity altered;Narrowed  Speed/Olga: Pace decreased (< 100 feet/min); Slow  Step Length: Right shortened;Left shortened  Gait Abnormalities: Shuffling gait; Decreased step clearance; Path deviations (lateral pelvic shift with forward flexion. Cues for posture and to maintain YOSSI within RW)  Distance (ft): 15 Feet  Assistive Device: Walker, rolling;Gait belt       PT Exercises  Exercise Treatment: x 10 BLE: ankle pumps, LAQ, marches, clamshell -- cues for technique, done as tolerated with rest breaks as needed       Safety Devices  Type of Devices: All fall risk precautions in place;Call light within reach; Chair alarm in place;Gait belt;Nurse notified; Left in chair       Goals  Short Term Goals  Time Frame for Short Term Goals: 7 days (3/7/23) PT re-evaluation completed on 3/11/23.  Goals extended to 3/18/23 due to patient's change of medical status and recent surgery on 3/7/23 -- PROGRESSING 3/13  Short Term Goal 1: Pt will perform bed mobility with supervision-- 3/11 min assist  Short Term Goal 2: Pt will perform transfers with no AD and supervision-- 3/11 min assist  Short Term Goal 3: Pt will ambulate 200ft with no AD and SBA-- 3/11 10 feet x 2 with RW and min assist.  Short Term Goal 4: Pt will negotiate 12 steps with R handrail and SBA-- 3/11 patient declined to attempt  Short Term Goal 5: Pt will perform 12-15 reps BLE exercises by 3/3/23--3/11 patient declined to attempt  Patient Goals   Patient Goals : \"to go home\"    Education  Patient Education  Education Given To: Patient  Education Provided Comments: Disease Specific Education: Patient educated on importance of out of bed mobility, prevention of complications of bedrest, and general safety (importance of using call bell) during hospitalization. Education Method: Demonstration;Verbal  Barriers to Learning: Cognition  Education Outcome: Verbalized understanding; Unable to demonstrate understanding;Continued education needed    [facility] 6 Clicks Inpatient Mobility:  AM-PAC Basic Mobility - Inpatient   How much help is needed turning from your back to your side while in a flat bed without using bedrails?: A Little  How much help is needed moving from lying on your back to sitting on the side of a flat bed without using bedrails?: A Little  How much help is needed moving to and from a bed to a chair?: A Little  How much help is needed standing up from a chair using your arms?: A Little  How much help is needed walking in hospital room?: A Little  How much help is needed climbing 3-5 steps with a railing?: A Lot  AM-PAC Inpatient Mobility Raw Score : 17  AM-PAC Inpatient T-Scale Score : 42.13  Mobility Inpatient CMS 0-100% Score: 50.57  Mobility Inpatient CMS G-Code Modifier : CK    Therapy Time   Individual Concurrent Group Co-treatment   Time In 1006         Time Out 1031         Minutes 25         Timed Code Treatment Minutes: 25 Minutes       Bob Yeager, PT, DPT  If pt is unable to be seen after this session, please let this note serve as discharge summary. Please see case management note for discharge disposition. Thank you.

## 2023-03-13 NOTE — PROGRESS NOTES
Perfect serve sent to Dr. Plascencia \"Does this pt still need her petersen now that her SHARRON is resolving, her urine output is good and she is eating and drinking. If possible could we remover her petersen today? Pt is getting up and walking to the bathroom. Thanks!\"    1430: Message back from MD \"Yes please. Good idea.\"     New order placed for petersen cath removal.

## 2023-03-13 NOTE — ADT AUTH CERT
Subscriber Details  Hospital Account [de-identified]  CVG Subscriber Name/Sex/Relation Subscriber  Subscriber Address/Phone Subscriber Emp/Emp Phone   1. [facility]   [id number] Grover Gold - Female   (Self) [address]   [address], [facility]   [phone](O) DISABLED    2.  [address]   [id number] Grover Gold - Female   (Self) 1960 [address] [facility]   [address] [place] [address]  [address]   [phone](Y)   [phone](C)      Utilization Reviews       Diabetes, Hypoglycemia - Care Day 16 (3/13/2023) by Rajesh Lopez RN       Review Status Review Entered   Completed 3/13/2023 1359       Created By   Rajesh Lopez RN      Criteria Review      Care Day: 16 Care Date: 3/13/2023 Level of Care: Inpatient Floor    Guideline Day 2    Level Of Care    (X) Floor to discharge    3/13/2023 1:59 PM EDT by Benita Her      medical acute    Clinical Status    ( ) * Hemodynamic stability    3/13/2023 1:59 PM EDT by Benita Her      pr 75  bp 134/71    (X) * Mental status at baseline    3/13/2023 1:59 PM EDT by Benita Her      awake, alert    (X) * Neurologic or other findings induced by hypoglycemia absent    3/13/2023 1:59 PM EDT by Adelita Jones    ( ) * Serum glucose acceptable without parenteral support    3/13/2023 1:59 PM EDT by Benita Her      POC Glucose: 248 (H) 179 (H)   Glucose, Random: 259 (H)  STIL WITH INSULIN MEDS    ( ) * Possible etiologies identified and outpatient follow-up acceptable    3/13/2023 1:59 PM EDT by Benita Her      known diabetec    ( ) * Discharge plans and education understood    Activity    ( ) * Ambulatory or acceptable for next level of care    3/13/2023 1:59 PM EDT by Benita Her      Improved tolerance to gait with RW, WITH MIN ASSIST    Routes    ( ) * Oral hydration    3/13/2023 1:59 PM EDT by Benita Her po hydration,   0.9nacl 100ml/hr prn iv x 1    ( ) * Oral medications or regimen acceptable for next level of care    3/13/2023 1:59 PM EDT by Yolis Patel      amlodipine 5mg qd po  aspirin 81mg qd po  lipitor 20mg qd po  ca carb-cholecalciferol 1 tablet qd po  lovenox 40mg qd sc  humalog 4 units qdhs sc x2  humalog 4 units tid sc  flagyl 500mg q8h iv  tylenol 650mg q4h prn po x 1  tramadol 50mg q6h prn po x 2    (X) * Oral diet or acceptable for next level of care    3/13/2023 1:59 PM EDT by Jose Montemayor, [address] DIET; Easy to Chew    Interventions    (X) Glucose monitoring    3/13/2023 1:59 PM EDT by Yolis Patel      q1h    Medications    ( ) * Outpatient diabetic medication regimen established       Definitions for Care Day 16    Hemodynamic stability    (X) Hemodynamic stability, as indicated by  1 or more  of the following :       (X) Patient hemodynamically stable, as indicated by  ALL  of the following  (1) (2) (3) (4) (5):          (X) Tachycardia absent          (X) Hypotension absent          (X) No evidence of inadequate perfusion (eg, no myocardial ischemia)          (X) No other hemodynamic abnormalities (eg, no Orthostatic hypotension)       Tachycardia absent    (X) Tachycardia absent, as indicated by  1 or more  of the following  (1) (2):       (X) Heart rate less than or equal to 100 beats per minute in adult or child 6 years or older       Hypotension absent    (X) Hypotension absent, as indicated by  1 or more  of the following  (1) (2) (3) (4):       (X) SBP greater than or equal to 90 mm Hg and without recent decrease greater than 40 mm Hg from       baseline in adult or child 10 years or older       * Milestone   Additional Notes   DATE: AVEL 3/13/23         PERTINENT UPDATES:   Still with pain, uncontrolled   Stil with high glucose levels, uncontrolled   Still on IV 0.9nacl         VITALS:   temp 98.1 (36.7)   rr 18   spo2 90 ra   pain 10         ABNL/PERTINENT LABS/RADIOLOGY/DIAGNOSTIC STUDIES:   CALCIUM, SERUM, 850500: 7.7 (L)   Phosphorus: 1.5 (L)         PHYSICAL EXAM:   CONSTITUTIONAL:     LUNGS:  no crackles or wheezing   ABDOMEN:   + bowel sounds, soft, non-distended, tenderness noted midline incision, bryanna drain serous   INCISION: no erythema or drainage, staples intact      MD CONSULTS/ASSESSMENT AND PLAN:      *general surgery   ASSESSMENT AND PLAN:   60 y.o. female status post Robotic converted to open right colectomy           GI: continue with diet as adam   SHARRON: nephrology managing   Pain: continue with current orders   Activity: PT/OT             *IM   Colon Cancer: New diagnosis. Weight loss, abdominal pain, constipation and elevated CEA. Colonoscopy 2/27, pathology report now finalized and showed invasive Adenocarcinoma. S/p Colectomy 3/7. Pathology suggested localized disease; per Oncology, may not need additional chemotherapy. NGT out. Advance diet as tolerated. Pain control. Anemia 2/2 post op state, colon ca-transfuse PRBC's <7. 1 unit transfused 3/12. Hgb 8.4 today       Hypoglycemia due to insulin use with decreased p.o. intake. Blood sugars were labile pre-op and patient switched to NPH. Now with low BS as diet being advanced slowly. Will resume lower dose Lantus with SSI and monitor. SHARRON with acidosis. 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 Patient was on a bicarb drip. Improved but then worsened again last few days post-op. Continue hydration. Nephrology following. Monitor renal function. Hypertension. Blood pressure labile. PO meds on hold while NPO. Monitor. Somnolent yest, more alert this am, wbc 4.8, h&h 7.1->6.2, LA 1.6, PCT 20.02->13.06, CXR: Bibasilar pulmonary opacities could represent atelectasis or infection, temp 100.5. repeat UA-stable, Current abx:Cefazolin/Flagyl. Hold gabapentin and trazodone.        DVT Prophylaxis: Lovenox   Diet: ADULT DIET; Easy to Chew   ADULT ORAL NUTRITION SUPPLEMENT; Breakfast, Lunch, Dinner; Diabetic Oral Supplement   Code Status: Full Code   PT/OT Eval Status: patient ambulatory at home. Therapy re-ordered post-op. Dispo - >2days. SNF recs, dc when ok with surgery         *nephrology   Assessment :       Acute Kidney Injury   Creatinine peaked to 2.3-2 on consult   Creatinine uizqnlhd-1-1.3   She is known to have CKD 2 baseline-likely due to nephrectomy, recurrent SHARRON, diabetes   She has a history of Wilms tumor and nephrectomy   Had SHARRON on 2/21-seen by me again. Her protein creatinine ratio was normal   She also has diabetes mellitus on insulin-which is a risk factor for CKD         Plan:   She has CKD stage II at baseline due to nephrectomy   Will need regular follow-up and optimize acute to avoid      Dialysis/progression of renal failure in the future   Explained that to her   We will follow-up in the office after discharge        Hypertension    BP: (134)/(71)  Heart Rate:  [75]    BP goal inpatient 190-547 systolic inpatient   Blood pressure is relatively soft   She is also known to have atrial fibrillation             PT/OT/SLP/CM ASSESSMENT OR NOTES:   *PT   Grossly min A for mobility with increased time d/t pain. Cues for safety and posture throughout session. Improved tolerance to gait with RW this date and improved alert level. Pt would benefit from continued skilled therapy to address deficits. Continue to recommend SNF at d/c.               Diabetes, Hypoglycemia - Care Day 15 (3/12/2023) by Tien Anaya RN       Review Status Review Entered   Completed 3/13/2023 1334       Created By   Tien Anaya RN      Criteria Review      Care Day: 15 Care Date: 3/12/2023 Level of Care: Inpatient Floor    Guideline Day 2    Level Of Care    (X) Floor to discharge    3/13/2023 1:34 PM EDT by Hermelindo Rodriguez      medical acute    Clinical Status    ( ) * Hemodynamic stability    3/13/2023 1:34 PM EDT by Hermelindo Rodriguez      pr 75-90  bp 152/71 147/63    (X) * Mental status at baseline    3/13/2023 1:34 PM EDT by Jose [address], [address]    (X) * Neurologic or other findings induced by hypoglycemia absent    3/13/2023 1:34 PM EDT by Natalie Harris      Neurologic:  Neurovascularly intact without any focal sensory/motor deficits.  Cranial nerves: II-XII intact, grossly non-focal.    ( ) * Serum glucose acceptable without parenteral support    3/13/2023 1:34 PM EDT by Natalie Harris      POC Glucose: 342 (H) 315 (H) 347 (H) 201 (H) 267 (H)  Glucose, Random: 347 (H)  ON insulin meds    ( ) * Possible etiologies identified and outpatient follow-up acceptable    3/13/2023 1:34 PM EDT by Natalie Harris      known diabetec - on insulin    ( ) * Discharge plans and education understood    Activity    ( ) * Ambulatory or acceptable for next level of care    3/13/2023 1:34 PM EDT by Natalie Harris      still needs assists    Routes    ( ) * Oral hydration    3/13/2023 1:34 PM EDT by Natalie Harris      po hydration    0.9nacl 50ml/hr cont iv   0.9nacl 100ml/hr prn iv x 2    ( ) * Oral medications or regimen acceptable for next level of care    3/13/2023 1:34 PM EDT by Natalie Harris      aspirin 81mg qd po  lipitor 20mg qd po  ca carb-cholecalciferol 1 tab qd po  ancef 2g q12h iv  lovenox 40mg qd sc  humalog 4 units qdhs sc  humalog 4 units tid sc  humalog 6 units tid sc  flagyl 500mg q8h iv  tylenol 650mg q4h prn pox3  toradol 50mg q6h prn po    (X) * Oral diet or acceptable for next level of care    3/13/2023 1:34 PM EDT by Jose 71, [address] DIET; Easy to Chew    Interventions    (X) Glucose monitoring    3/13/2023 1:34 PM EDT by Natalie Harris      q1h    Medications    ( ) * Outpatient diabetic medication regimen established       Definitions for Care Day 15    Hemodynamic stability    ( ) Hemodynamic stability, as indicated by  1 or more  of the following :       ( ) Patient hemodynamically stable, as indicated by  ALL  of the following  (1) (2) (3) (4) (5):          (X) Tachycardia absent          (X) Hypotension absent          (X) No evidence of inadequate perfusion (eg, no myocardial ischemia)       Tachycardia absent    (X) Tachycardia absent, as indicated by  1 or more  of the following  (1) (2):       (X) Heart rate less than or equal to 100 beats per minute in adult or child 6 years or older       Hypotension absent    (X) Hypotension absent, as indicated by  1 or more  of the following  (1) (2) (3) (4):       (X) SBP greater than or equal to 90 mm Hg and without recent decrease greater than 40 mm Hg from       baseline in adult or child 10 years or older       (X) Mean arterial pressure [A] greater than or equal to 70 mm Hg in adult or child 10 years or       older       * Milestone   Additional Notes   DATE: 3/12/26         PERTINENT UPDATES:   Still with ngt , bryanna drain   Still high bun   Low calcium, high procalcitonin   Low h&h   Hypertensive   Still on Ivf and IV ancef, flagyl,  po pain meds   Still on o2 1lpm nc   Transfused 1 unit prbc         VITALS:   temp 99.3 (37.4)   rr 16-19   spo2 92 1 lpm nc    pain 10         ABNL/PERTINENT LABS/RADIOLOGY/DIAGNOSTIC STUDIES:   Chloride: 111 (H)   BUN,BUNPL: 21 (H)   Est, Glom Filt Rate: 51 ! CALCIUM, SERUM, 406939: 7.5 (L)   Procalcitonin: 13.06 (H)      RBC: 2.22 (L)   Hemoglobin Quant: 6.2 (LL)  8.8 (L)   Hematocrit: 19.9 (LL)  27.3 (L)         PHYSICAL EXAM:   Abdomen: Soft, nontender. Midline incision intact Positive bowel sounds. MD CONSULTS/ASSESSMENT AND PLAN:      *IM   Colon Cancer: New diagnosis. Weight loss, abdominal pain, constipation and elevated CEA. Colonoscopy 2/27, pathology report now finalized and showed invasive Adenocarcinoma. S/p Colectomy 3/7. Pathology suggested localized disease; per Oncology, may not need additional chemotherapy. NGT out. Advance diet as tolerated. Pain control.         Anemia 2/2 post op state, colon ca-transfuse PRBC's <7. 1 unit transfused 3/12       Hypoglycemia due to insulin use with decreased p.o. intake. Blood sugars were labile pre-op and patient switched to NPH. Now with low BS as diet being advanced slowly. Will resume lower dose Lantus with SSI and monitor. SHARRON with acidosis. 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 Patient was on a bicarb drip. Improved but then worsened again last few days post-op. Continue hydration. Nephrology following. Monitor renal function. Hypertension. Blood pressure labile. PO meds on hold while NPO. Monitor. Somnolent yest, more alert this am, wbc 4.8, h&h 7.1->6.2, LA 1.6, PCT 20.02->13.06, CXR: Bibasilar pulmonary opacities could represent atelectasis or infection, temp 100.5. repeat UA-stable, Current abx:Cefazolin/Flagyl. Hold gabapentin and trazodone. DVT Prophylaxis: Lovenox   Diet: ADULT DIET; Clear Liquid   ADULT ORAL NUTRITION SUPPLEMENT; Breakfast, Dinner; Clear Liquid Oral Supplement   Code Status: Full Code   PT/OT Eval Status: patient ambulatory at home. Therapy re-ordered post-op. Dispo - >2days. May need to consider SNF pending therapy          *GENERAL SURGERY   ASSESSMENT:    POD 5 R colectomy   Temperature curve improved    Altered mental status resolved    Anemia       PLAN:    Advance diet   PRBC          *NEPHROLOGY    Assessment :       Acute Kidney Injury   Creatinine peaked to 2.3-2 on consult   Creatinine lkzhyfqc-6-1.3   She is known to have CKD 2 baseline-likely due to nephrectomy, recurrent SHARRON, diabetes   She has a history of Wilms tumor and nephrectomy   Had SHARRON on 2/21-seen by me again.   Her protein creatinine ratio was normal   She also has diabetes mellitus on insulin-which is a risk factor for CKD       Hypertension    BP: (124-152)/(56-71)  Heart Rate:  [73-91]    BP goal inpatient 482-726 systolic inpatient   Blood pressure is relatively soft   She is also known to have atrial fibrillation       Plan:   BP acceptable   Cr trending down. Cr 1.2    Stop IVFs. Taking PO           ORDERS:   ADULT ORAL NUTRITION SUPPLEMENT; Breakfast, Lunch, Dinner; Diabetic Oral Supplement     Transfuse RBC            Diabetes, Hypoglycemia - Care Day 14 (3/11/2023) by Iva Mroales RN       Review Status Review Entered   Completed 3/13/2023 1238       Created By   Iva Morales RN      Criteria Review      Care Day: 14 Care Date: 3/11/2023 Level of Care: Inpatient Floor    Guideline Day 2    Level Of Care    (X) Floor to discharge    3/13/2023 12:38 PM EDT by Sony Haddad      medical acute    Clinical Status    ( ) * Hemodynamic stability    3/13/2023 12:38 PM EDT by Sony Haddad      pr 79-85  bp 134/52 146/68    (X) * Mental status at baseline    3/13/2023 12:38 PM EDT by Magen Briones but somnolent    (X) * Neurologic or other findings induced by hypoglycemia absent    3/13/2023 12:38 PM EDT by Sony Haddad      Neurologic:  Neurovascularly intact without any focal sensory/motor deficits. Cranial nerves: II-XII intact, grossly non-focal.    ( ) * Serum glucose acceptable without parenteral support    3/13/2023 12:38 PM EDT by Sony Haddad      POC Glucose: 281 (H) 403 (H) 340 (H) 252 (H) 307 (H)  Glucose, Random: 273 (H)    ( ) * Possible etiologies identified and outpatient follow-up acceptable    3/13/2023 12:38 PM EDT by Sony Haddad      known to have diabetes on insulin.     ( ) * Discharge plans and education understood    Activity    ( ) * Ambulatory or acceptable for next level of care    3/13/2023 12:38 PM EDT by Drew Christine with assistance    Routes    ( ) * Oral hydration    3/13/2023 12:38 PM EDT by Sony Haddad      po hydration    0.9nacl 50ml/hr cont iv    ( ) * Oral medications or regimen acceptable for next level of care    3/13/2023 12:38 PM EDT by Sony Haddad      amlodipine 5mg qd po  aspirin 81mg qd po  lipitor 20mg qd po  ca carb-cholecalciferol 1 tablet qd po  ancef 2g q12h iv  lovenox 40mg qd sc  lantus 10 units qd sc  humalog 4 units tid sc  humalog 4 units qdhs sc  flagyl 500mg q8h iv  k phos 30mmol once iv    ( ) * Oral diet or acceptable for next level of care    3/13/2023 12:38 PM EDT by Jose Montemayor, [address] DIET; Easy to Chew    Interventions    (X) Glucose monitoring    3/13/2023 12:38 PM EDT by Anthony Pemberton      q1h    Medications    ( ) * Outpatient diabetic medication regimen established       Definitions for Care Day 14    Hemodynamic stability    ( ) Hemodynamic stability, as indicated by  1 or more  of the following :       ( ) Patient hemodynamically stable, as indicated by  ALL  of the following  (1) (2) (3) (4) (5):          (X) Tachycardia absent          (X) Hypotension absent          (X) No evidence of inadequate perfusion (eg, no myocardial ischemia)       Tachycardia absent    (X) Tachycardia absent, as indicated by  1 or more  of the following  (1) (2):       (X) Heart rate less than or equal to 100 beats per minute in adult or child 6 years or older       Hypotension absent    (X) Hypotension absent, as indicated by  1 or more  of the following  (1) (2) (3) (4):       (X) SBP greater than or equal to 90 mm Hg and without recent decrease greater than 40 mm Hg from       baseline in adult or child 10 years or older       * Milestone   Additional Notes   DATE: AVEL 3/11/23         PERTINENT UPDATES:   Somnolent, arousable to verb stim, reports feeling tired   Febrile, hypertensive   Pain, uncontrolled   High bun, crea   Low calcium levels   Low h&h   +uti   Still with ngt, bryanna drain   Still on Ivf and IV ancef, flagyl, IV k phos         VITALS:   temp 100.5 (38.1)   rr 16   spo2 91 nc 1 lpm   pain 8         ABNL/PERTINENT LABS/RADIOLOGY/DIAGNOSTIC STUDIES:   POC Glucose: 281 (H)      BUN,BUNPL: 30 (H)   Creatinine: 1.4 (H)   Est, Glom Filt Rate: 42 !    CALCIUM, SERUM, 376041: 8.1 (L)      Magnesium: 2.50 (H)   Phosphorus: 1.5 (L)   Procalcitonin: 20.02 (H)      RBC: 2.58 (L)   Hemoglobin Quant: 7.1 (L)   Hematocrit: 23.8 (L)      Glucose, UA: 250 ! Blood, Urine: SMALL ! Protein, UA: 30 ! Leukocyte Esterase, Urine: TRACE !         XRAY CHEST   Bibasilar pulmonary opacities could represent atelectasis or infection         PHYSICAL EXAM:   ABD: Soft. Minimal tenderness. INCISION:  C/D/I   HEATHER serous. MD CONSULTS/ASSESSMENT AND PLAN:      *general surgery   ASSESSMENT:    POD 4 R colectomy   Fever        PLAN:    CXR   UA   Labs   Continue clears for now              *IM   Assessment/Plan:   Colon Cancer: New diagnosis. Weight loss, abdominal pain, constipation and elevated CEA. Colonoscopy 2/27, pathology report now finalized and showed invasive Adenocarcinoma. S/p Colectomy 3/7. Pathology suggested localized disease; per Oncology, may not need additional chemotherapy. NGT out. Advance diet as tolerated. Pain control. Hypoglycemia due to insulin use with decreased p.o. intake. Blood sugars were labile pre-op and patient switched to NPH. Now with low BS as diet being advanced slowly. Will resume lower dose Lantus with SSI and monitor. SHARRON with acidosis. 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 Patient was on a bicarb drip. Improved but then worsened again last few days post-op. Continue hydration. Nephrology following. Monitor renal function. Hypertension. Blood pressure labile. PO meds on hold while NPO. Monitor. Somnolent this morning, wbc 6.8, h&h 7.1/23.8, LA 1.6, PCT 20.02, CXR: Bibasilar pulmonary opacities could represent atelectasis or infection, temp 100.5. Check UA-has trinity, Current abx:Cefazolin/Flagyl. Hold gabapentin and trazodone. DVT Prophylaxis: Lovenox   Diet: ADULT DIET;  Clear Liquid   ADULT ORAL NUTRITION SUPPLEMENT; Breakfast, Dinner; Clear Liquid Oral Supplement   Code Status: Full Code   PT/OT Eval Status: patient ambulatory at home. Therapy re-ordered post-op. Dispo - >2days. May need to consider SNF pending therapy             *NEPHROLOGY   Assessment :       Acute Kidney Injury   Creatinine peaked to 2.3-2 on consult   Creatinine eufzxrxy-4-4.3   She is known to have CKD 2 baseline-likely due to nephrectomy, recurrent SHARRON, diabetes   She has a history of Wilms tumor and nephrectomy   Had SHARRON on 2/21-seen by me again. Her protein creatinine ratio was normal   She also has diabetes mellitus on insulin-which is a risk factor for CKD        Hypertension    BP: (130-132)/(52-56)  Heart Rate:  [77-82]    BP goal inpatient 162-587 systolic inpatient   Blood pressure is relatively soft   She is also known to have atrial fibrillation        Plan:   BP acceptable   Cr trending down. Good UO    PO intake low, continue NS infusion   Replacing phos      MEDICATIONS:   tylenol 650mg q4h prn po x 2   tramadol 50mg q6h prn po x 1         PT/OT/SLP/CM ASSESSMENT OR NOTES:   *ot    pt required min A for standing balance, bathroom mobility and toilet transfer with RW. Pt's performance is significantly affected by low endurance. She often keeps eyes closed during activity. She reports feeling sleepy since admission. Extensive assist needed for LE ADLs. Recommend SNF for skilled OT services. Cont OT in acute care. *PT   Today she required minimum assistance with bed mobility, min assist for transfers and min assist to ambulate 10 feet x 2 with a rolling walker. Patient was extremely lethargic during the session and needed frequent cueing for safety. RN aware. Patient is functioning below baseline and would benefit from skilled therapy to address current deficits mentioned above.  Due to patient's significant change in her functional status, PT now recommends that this patient receive skilled PT in the SNF setting, when medically stable, in order to address her therapy deficits and to help her maximize her safety and independence with all functional mobility. PT to continue to follow. Diabetes, Hypoglycemia - Care Day 13 (3/10/2023) by Tien Anaya RN       Review Status Review Entered   Completed 3/13/2023 1204       Created By   Tien Anaya RN      Criteria Review      Care Day: 13 Care Date: 3/10/2023 Level of Care: Inpatient Floor    Guideline Day 2    Level Of Care    (X) Floor to discharge    3/13/2023 12:04 PM EDT by Hermelindo Rodriugez      medical acute    Clinical Status    ( ) * Hemodynamic stability    3/13/2023 12:04 PM EDT by Hermelindo Rodriguez      pr 74-90  bp 151/66 152/59    (X) * Mental status at baseline    (X) * Neurologic or other findings induced by hypoglycemia absent    3/13/2023 12:04 PM EDT by Hermelindo Rodriguez      awake and alert    ( ) * Serum glucose acceptable without parenteral support    3/13/2023 12:04 PM EDT by Hermelindo Rodriguez      POC Glucose: 64 (L) 55 (L) 55 (L): 68 (L) 265 (H) 307 (H) 354 (H) 290 (H)    ( ) * Possible etiologies identified and outpatient follow-up acceptable    3/13/2023 12:04 PM EDT by Hermelindo Rodriguez      known to have diabetes on insulin. ( ) * Discharge plans and education understood    Activity    ( ) * Ambulatory or acceptable for next level of care    3/13/2023 12:04 PM EDT by Jose 72, [address] [facility] with assistance    Routes    ( ) * Oral hydration    3/13/2023 12:04 PM EDT by Hermelindo Rodriguez      0.9nacl 50ml/hr cont iv  ADULT DIET; Clear Liquid    ( ) * Oral medications or regimen acceptable for next level of care    3/13/2023 12:04 PM EDT by Hermelindo Rodriguez      amlodipine 5mg qd po  aspirin 81mg qd po  lipitor 20mg qd po  ca carb-cholecalciferol 1 tablet qd po  ancef 2g q12h iv  lovenox 40mg qd sc  humalog 4 units qdhs sc  flagyl 500mg q8h iv    ( ) * Oral diet or acceptable for next level of care    3/13/2023 12:04 PM EDT by Hermelindo Rodriguez      ADULT DIET;  Clear Liquid    Interventions    (X) Glucose monitoring    3/13/2023 12:04 PM EDT by Daysi Brandt      q1h    Medications    ( ) * Outpatient diabetic medication regimen established       Definitions for Care Day 13    Hemodynamic stability    ( ) Hemodynamic stability, as indicated by  1 or more  of the following :       ( ) Patient hemodynamically stable, as indicated by  ALL  of the following  (1) (2) (3) (4) (5):          (X) Tachycardia absent          (X) Hypotension absent          (X) No evidence of inadequate perfusion (eg, no myocardial ischemia)       Tachycardia absent    (X) Tachycardia absent, as indicated by  1 or more  of the following  (1) (2):       (X) Heart rate less than or equal to 100 beats per minute in adult or child 6 years or older       Hypotension absent    (X) Hypotension absent, as indicated by  1 or more  of the following  (1) (2) (3) (4):       (X) SBP greater than or equal to 90 mm Hg and without recent decrease greater than 40 mm Hg from       baseline in adult or child 10 years or older       * Milestone   Additional Notes   DATE: [facility] [place] 3/10/23         RELEVANT BASELINES: (lab values, vitals, o2 amount/delivery, etc.)   Known to have diabetes - on insulin         PERTINENT UPDATES:   -with low grade fever   -Hypertensive   -with q1h glucose monitoring   -on IV fluids, IV flagyl, IV ancef   -with desaturation noted requiring 2 lpm nc   -with HEATHER drain         VITALS:   temp 100.2 (37.9)   rr 18   spo2 88 ra requiring 2 lpm nc         ABNL/PERTINENT LABS/RADIOLOGY/DIAGNOSTIC STUDIES:   BUN,BUNPL: 33 (H)   Est, Glom Filt Rate: 39 ! Glucose, Random: 64 (L)   CALCIUM, SERUM, 898806: 8.1 (L)         PHYSICAL EXAM:   ABDOMEN: Bowel sounds are hypoactive, but there is no rebound or guarding.   HEATHER drain is clear         MD CONSULTS/ASSESSMENT AND PLAN:     *ONCOLOGY   ASSESSMENT AND PLAN:    Colon cancer:   -Invasive adenocarcinoma   -T3, N0, M0   -No lymphatic or vascular invasion   -No perineural invasion   -Grade 2 or moderately differentiated   -No evidence of metastatic disease   -I would not offer her adjuvant chemotherapy based on the risk and benefit. This also takes into consideration her mental status.   -Pre-op CEA 21.9       Wilms tumor:   -Surgery at age 4   -Pathology not available       Breast carcinoma:   -Diagnosis 9/24/2014   -Right breast   -T1c, N1A, M0, grade 1   -ER positive, TX positive and HER2/edward negative   -Oncotype Dx of 8   -Patient stopped letrozole prematurely due to hot flashes       Dementia:   -Mild per granddaughter   -She does know where she is today       Bipolar disorder:   -Stable       Type 2 diabetes   -Managed by the hospitalist       Genetics:   -Now that she has had 3 cancers she will be referred to genetics for an evaluation             *general surgery    ASSESSMENT AND PLAN:   60 y.o. female status post Robotic converted to open right colectomy       GI: remove ngt and start clear liquid diet   : continue with petersen to monitor I/O - removal per nephrology   SHARRON: nephrology managing, Cr trending down   Pain: continue with current orders   Activity: OOB to chair   Path: reviewed - oncology following           *nephrology    Assessment :    Acute Kidney Injury   Creatinine peaked to 2.3-2 on consult   Creatinine [id number]-4-1.3   She is known to have CKD 2 baseline-likely due to nephrectomy, recurrent SHARRON, diabetes   She has a history of Wilms tumor and nephrectomy   Had SHARRON on 2/21-seen by me again. Her protein creatinine ratio was normal   She also has diabetes mellitus on insulin-which is a risk factor for CKD        Hypertension    BP: (151)/(66)  Heart Rate:  [74]    BP goal inpatient 039-161 systolic inpatient   Blood pressure is relatively soft   She is also known to have atrial fibrillation         *IM   Assessment/Plan:   \"Patient with PMH of DM 2, HTN, CAD s/p PCI, bipolar disorder presented to the ED today for unresponsive state.   Patient was apparently found unresponsive on the toilet while doing her bowel prep for a colonoscopy procedure scheduled tomorrow with Dulce KNOWLES. She also supposed to get an EGD. Patient reports she remembers getting diaphoretic and weak just prior to using the bathroom. And after she sat on the toilet she became very lightheaded and lost her consciousness. She reports she takes 22 units of Lantus in the morning which she did. Was instructed not to eat anything so she drank some water and Gatorade in the morning. She also proceeded to take 8 units of the short acting in the morning. Patient reports she is not aware that she is not supposed to take the short acting insulin with a meal if she was not going to eat anything for the meal.  She has been on insulin and a diabetic for about 10 years. EMS was called, and they reported her blood sugar was 40 at the scene. \"       Colon Cancer: New diagnosis. Weight loss, abdominal pain, constipation and elevated CEA. Colonoscopy 2/27, pathology report now finalized and showed invasive Adenocarcinoma. S/p Colectomy 3/7. Pathology suggested localized disease; per Oncology, may not need additional chemotherapy. NGT out. Advance diet as tolerated. Pain control. Hypoglycemia due to insulin use with decreased p.o. intake. Blood sugars were labile pre-op and patient switched to NPH. Now with low BS as diet being advanced slowly. Will resume lower dose Lantus with SSI and monitor. SHARRON with acidosis. 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 Patient was on a bicarb drip. Improved but then worsened again last few days post-op. Continue hydration. Nephrology following. Monitor renal function. Hypertension. Blood pressure labile. PO meds on hold while NPO. Monitor. DVT Prophylaxis: Lovenox   Diet: Diet NPO Exceptions are: Sips of Water with Meds   Code Status: Full Code   PT/OT Eval Status: patient ambulatory at home. Therapy re-ordered post-op.         Dispo - ?2-3 days. May need to consider SNF pending therapy         ORDERS:   Nasogastric Tube Removal    ADULT ORAL NUTRITION SUPPLEMENT; Breakfast, Dinner; Clear Liquid Oral Supplement    PT, OT eval and treat          PT/OT/SLP/CM ASSESSMENT OR NOTES:   *cm   Patient edu on current need and request PT/OT eval is open to dc to SNF if recs, hopeful can dc home. Referral sent to [facility] [place] , able to accept, pending INS approval              Diabetes, Hypoglycemia - Care Day 12 (3/9/2023) by Tiana Keller RN       Review Status Review Entered   Completed 3/10/2023 0843       Created By   Tiana Keller RN      Criteria Review      Care Day: 12 Care Date: 3/9/2023 Level of Care: Inpatient Floor    Guideline Day 2    Level Of Care    ( ) Floor to discharge    3/10/2023 8:43 AM EST by Antony العراقي      Tele/Med/Onc    Clinical Status    ( ) * Hemodynamic stability    3/10/2023 8:43 AM EST by Antony العراقي      FL: 104 102 102  BP: 133/56 115/55 144/68    (X) * Mental status at baseline    3/10/2023 8:43 AM EST by Kecia Jenkins and oriented x 4    (X) * Neurologic or other findings induced by hypoglycemia absent    3/10/2023 8:43 AM EST by Antony العراقي      Neurologic: Neurovascularly intact without any focal sensory/motor deficits.  Cranial nerves: II-XII intact, grossly non-focal.    ( ) * Serum glucose acceptable without parenteral support    3/10/2023 8:43 AM EST by Debora Knapp      Glucose, Random: 174 (H)    ( ) * Possible etiologies identified and outpatient follow-up acceptable    ( ) * Discharge plans and education understood    Activity    ( ) * Ambulatory or acceptable for next level of care    3/10/2023 8:43 AM EST by Antony العراقي      Activity: OOB to chair    Routes    ( ) * Oral hydration    ( ) * Oral medications or regimen acceptable for next level of care    3/10/2023 8:43 AM EST by Antony العراقي      Tylenol 650mg PO q4h PRN x 3  Klonopin 0.5mg PO TID PRN x 1  Ultram 50mg PO q6h PRN x 1    3/10/2023 8:43 AM EST by Salma Newell      Norvasc 5mg PO daily  Aspirin 81mg PO daily  Lipitor 20mg PO daily  Calcium Cab-Cholecalciferol 250-3.125mg-mcg 1 tablet PO daily  Neurontin 600mg PO TID  Chronulac 30g PO BID  Lisinopril 40mg PO daily  Protonix 40mg PO daily  Desyrel 200mg PO nightly    ( ) * Oral diet or acceptable for next level of care    3/10/2023 8:43 AM EST by Salma Newell      Diet NPO Exceptions are: Sips of Water with Meds    Interventions    (X) Glucose monitoring    3/10/2023 8:43 AM EST by Salma Newell      POC Glucose: 168 (H) 129 (H) 88 82    Medications    ( ) * Outpatient diabetic medication regimen established       Definitions for Care Day 12    Hypotension absent    (X) Hypotension absent, as indicated by  1 or more  of the following  (1) (2) (3) (4):       (X) SBP greater than or equal to 90 mm Hg and without recent decrease greater than 40 mm Hg from       baseline in adult or child 10 years or older       * Milestone   Additional Notes   DATE: 03/09/2023 Care Day 12      RELEVANT BASELINES:   Creatinine xfljpjbq-5-5.3   She is known to have CKD 2 baseline      PERTINENT UPDATES:   - Patient awake and alert - complaining of NGT discomfort. Reports she is passing flatus   - Patient NPO with NGT. - Creatinine 1.7 to 2.0 today   - Continues with IV Ancef and IV Flagyl   - On IV 0.9% Sodium Chloride Infusion 100ml/hr    - No significant pedal edema   - Made only 475ml urine yesterday   - Patient has had 1.3L of gastric output yesterday on NG tube      VITALS:   T: 100.5 RR: 16 AR: 104 102 102 BP: 133/56 115/55 144/68   SPO2: 92% on RA      ABNL/PERTINENT LABS/RADIOLOGY/DIAGNOSTIC STUDIES:   Sodium: 139   Potassium: 5.2 (H)   Chloride: 105   CO2: 25   BUN,BUNPL: 33 (H)   Creatinine: 2.0 (H)   Anion Gap: 9   Est, Glom Filt Rate: 27 !    Magnesium: 1.60 (L)   CALCIUM, SERUM, [id number]: 8.2 (L)      Osmolality, Ur: 443   Sodium, Ur: 59      WBC: 10.5   RBC: 2.84 (L)   Hemoglobin Quant: 8.1 (L)   Hematocrit: 25.4 (L)   Platelet Count: 357      PHYSICAL EXAM:   CONSTITUTIONAL: fatigued    LUNGS: no crackles or wheezing   CARDIOVASCULAR: NAD, Edema none   ABDOMEN: hypoactive bowel sounds, soft, non-distended, tenderness noted midline incision, bryanna drain sanguinous, has feeding tube    INCISION: scant amt drainage on dressing. MD CONSULTS/ASSESSMENT AND PLAN:   INTERNAL MEDICINE:   Assessment/Plan:   Colon Cancer: New diagnosis. Weight loss, abdominal pain, constipation and elevated CEA. NGT clamping trial.       SHARRON with acidosis. Improved but then worsened again last few days. Resume hydration post-op. Monitor renal function. NEPHROLOGY:   Assessment :   Acute Kidney Injury   Creatinine peaked to 2.3-2 on consult   Creatinine dncpcmon-7-7.3   She is known to have CKD 2 baseline-likely due to nephrectomy, recurrent SHARRON, diabetes   She has a history of Wilms tumor and nephrectomy   Had SHARRON on 2/21-seen by me again.  Her protein creatinine ratio was normal   She also has diabetes mellitus on insulin-which is a risk factor for CKD       Hypertension    BP: (109-115)/(55-65)  Heart Rate: [102]    BP goal inpatient 389-198 systolic inpatient   Blood pressure is relatively soft   She is also known to have atrial fibrillation      Plan:   SHARRON appears to be due to low blood pressure, fluid shift   High output from NG tube   Agree with IV fluids   Check urine for sodium   Her blood pressure is low   We will try to bring it up to close to 140 to help renal recovery   Stop amlodipine   Stop lisinopril   Avoid iv antihypertensives      GENERAL SURGERY:   ASSESSMENT AND PLAN:   Status post Robotic converted to open right colectomy       GI: continue with ngt to suction today - start clamping ngt   SHARRON: nephrology managing   Pain: continue with current orders      MEDICATIONS:   0.9% Sodium Chloride Infusion 100ml/hr IV   Ancef 2000mg IV q12h   Magnesium Sulfate 4000mg IV x 1   Flagyl 500mg IV q8h   Lovenox 30mg SC daily   Insulin NPH 14units SC daily      ORDERS:   POCT Glucose X2Z   Basic Metabolic Panel w/ Reflex to MG daily x 7 days   Clamp nasogastric tube        Clinical Review 03/02, 03/03, 03/04, 03/05, 03/06, 03/07, 03/08 by Aleta Collazo RN       Review Status Review Entered   In Primary 3/8/2023 0906       Created By   Aleta Collazo RN      Criteria Review   DATE: 03/02/2023 Care Day 5     PERTINENT UPDATES:  - Patient has had 2 diarrhea-like bowel movements this am and is still complaining of abdominal pain. However, tolerating diet. - Still with elevated blood pressure - BP: 182/76 154/67 162/73  - CT imaging this morning with large amount of stool throughout the colon. No stricture noted. - Still on Humalog SSI     VITALS:  T: 98.6, oral RR: 17 MS: 54 BP: 182/76 SPO2: 96% on RA     ABNL/PERTINENT LABS/RADIOLOGY/DIAGNOSTIC STUDIES:  POC Glucose: 400 (H) 481 (H) 416 (H) 262 (H) 194 (H) 73 52 (L) 118 (H) 75     CT ABDOMEN PELVIS WO CONTRAST:  1. No acute abdominal or pelvic findings. 2. Large amount of formed stool throughout the colon. Correlate with any  evidence of constipation. XR ABDOMEN (KUB):  1. Moderate stool in the colon, correlate with signs of constipation. PHYSICAL EXAM:  HEENT: anicteric sclera, oropharyngeal membranes pink and moist.  Cardio: RRR  Lungs: non-labored, no respiratory distress  Abdomen: distended but soft, mild mid to lower abdominal tenderness  Extremities: no edema  Neuro: alert and oriented x 3     MD CONSULTS/ASSESSMENT AND PLAN:  GASTROENTEROLOGY:  Impression:  - CT imaging this morning with large amount of stool throughout the colon. No stricture noted. Plan:  1. Continued abdominal pain likely secondary to significant constipation as noted on CT today. Will give bowel prep. Low threshold to consult surgery if she does not tolerate prep/ has worsening pain given colonoscopy findings.   2. Follow-up biopsy results. INTERNAL MEDICINE:  Assessment/Plan:  1. Hypoglycemia due to insulin use with decreased p.o. intake. Blood sugars are labile. We will continue sliding scale. Patient on long-lasting insulin but had some hypoglycemia. We will reduce the dose tonight. 2. SHARRON with acidosis. Continue to monitor renal function. 3. Weight loss. CT scan did show constipation. GI following. Patient will receive prep. She continues to have obstructive picture she may need surgery evaluation. MEDICATIONS:  Aspirin 81mg PO daily  Lipitor 20mg PO daily  Calcium Carb-Cholecalciferol 250-3.125mg-mcg 1 tablet PO daily  Neurontin 600mg PO TID  Invega 9mg PO daily  Protonix 40mg PO daily  GoLytely 4000ml PO x 1  Brilinta 90mg PO BID  Desyrel 200mg PO nightly  Tylenol 650mg PO q6h PRN x 2  Klonopin 0.5mg PO TID PRN x 1  Percocet 7.5-325mg 1 tablet PO q8h PRN x 2  Lovenox 40mg SC daily  Lantus 8units SC nightly  Humalog SSI     ORDERS:  ADULT DIET; Regular; 4 carb choices (60 gm/meal); Low Potassium (Less than 3000 mg/day); Low Fiber  ADULT ORAL NUTRITION SUPPLEMENT; Breakfast, Lunch, Dinner; Diabetic Oral Supplement  POCT Glucose q4h     PT/OT/SLP/CM ASSESSMENT OR NOTES:  CM:  Patient plans discharging home alone with [facility]         DATE: 03/03/2023 Care Day 6     PERTINENT UPDATES:  - Patient states she did have bowel movement. She states abdominal pain has improved. Having BMs.   - Denies emesis, melena, hematochezia. - Tolerating oral intake   - Labile blood sugars. Patient have blood sugars down in the 60s this morning. In the evening blood sugars may be up to 400s. - Still with elevated blood pressure - BP: 146/74 157/76  - Continues on Humalog SSI     VITALS:  T: 98.4, oral RR: 2 OR: 56 BP: 157/76 SPO2: 98% on RA     ABNL/PERTINENT LABS/RADIOLOGY/DIAGNOSTIC STUDIES:  POC Glucose: 61 (L), 93, 401 (H), 293 (H,) 412 (H)     PHYSICAL EXAM:  Respiratory: Normal respiratory effort.  Clear to auscultation, bilaterally without Rales/Wheezes/Rhonchi. Cardiovascular: Regular rate and rhythm with normal S1/S2 without murmurs, rubs or gallops. Abdomen: Soft, mild epigastric tenderness, NABS, ND   Musculoskeletal: No clubbing, cyanosis or edema bilaterally. Full range of motion without deformity. Skin: Skin color, texture, turgor normal. No rashes or lesions. Neurologic: Neurovascularly intact without any focal sensory/motor deficits. Cranial nerves: II-XII intact, grossly non-focal.  Psychiatric: Alert and oriented x3. MD CONSULTS/ASSESSMENT AND PLAN:  GASTROENTEROLOGY:  ASSESSMENT AND PLAN:  61 yo F with h/o DM, HTN, CAD and bipolar DO who has diarrhea, anemia, weight loss and abnormal PET scan of the colon in the setting of an elevated CEA. Admission was prompted due to hypoglycemia following bowel prep for outpatient colonoscopy. Underwent inpatient colonoscopy (2/27)with findings of narrowing at around the proximal transverse colon or hepatic flexure. Path pending. Unable to traverse with scope and poor prep prevented optimal evaluation. CT yesterday demonstrated a large amount of stool throughout the colon. No stricture noted. She is having BMs and tolerating oral intake. Plan:  1. Continue daily MiraLax  2. Await biopsy results     INTERNAL MEDICINE:  1. Hypoglycemia due to insulin use with decreased p.o. intake. We will switch patient to NPH and give her more insulin in the a.m. and less in the p.m. to avoid hypoglycemia in AM.    2. SHARRON with acidosis. Creatinine 1.3  3. Weight loss. Patient having bowel movements and has improved. Awaiting biopsy report. 4. Hypertension. Blood pressure labile.      MEDICATIONS:  Aspirin 81mg PO daily  Lipitor 20mg PO daily  Calcium Carb-Cholecalciferol 250-3.125mg-mcg 1 tablet PO daily  Neurontin 600mg PO TID  Invega 9mg PO daily  Protonix 40mg PO daily  Glycolax 17g PO daily  Brilinta 90mg PO BID  Desyrel 200mg PO nightly  Klonopin 0.5mg PO TID PRN x 1  Percocet 7.5-325mg 1 tablet PO q8h PRN x 1  Lovenox 40mg SC daily  Humalog SSI  Insulin NPH 4units SC x 1 changed to Insulin NPH 4units SC nightly     ORDERS:  ADULT DIET; Regular; 4 carb choices (60 gm/meal); Low Potassium (Less than 3000 mg/day); Low Fiber  ADULT ORAL NUTRITION SUPPLEMENT; Breakfast, Lunch, Dinner; Diabetic Oral Supplement  POCT Glucose q4h        DATE: 03/04/2023 Care Day 7      PERTINENT UPDATES:  - Hypoglycemia improved. - Still with elevated blood pressure - BP: 163/65 180/72 162/70  - Paitent c/o increasing epigastric pain exacerbated by PO intake. No BMs for several days. Denies nausea or emesis. - Patient c/o difficultly breathing. RR: 16, o2sat 100% on room air and no s/s of resp distress observed. Patient medicated with PRN Clonazepam   - Biopsy results pending     VITALS:  T: 98.7 RR: 16 NJ: 59 BP: 180/72 SPO2: 97% on RA     ABNL/PERTINENT LABS/RADIOLOGY/DIAGNOSTIC STUDIES:  Sodium: 133 (L)  Potassium: 4.5  Chloride: 96 (L)  Glucose, Random: 354 (H)  POC Glucose: 169 (H) 381 (H) 456 (H) 252 (H)     Troponin: <0.01     WBC: 5.0  RBC: 3.24 (L)  Hemoglobin Quant: 9.2 (L)  Hematocrit: 28.7 (L)  Platelet Count: 079     XR CHEST PORTABLE:  No evidence of edema or pneumonia. Mild cardiac silhouette enlargement. EKG 12 LEAD:  Sinus bradycardia  Nonspecific T wave abnormality      PHYSICAL EXAM:  Respiratory: Normal respiratory effort. Clear to auscultation, bilaterally without Rales/Wheezes/Rhonchi. Cardiovascular: Regular rate and rhythm with normal S1/S2 without murmurs, rubs or gallops. Abdomen: Soft with mild epigastric tenderness, NABs, mild non tympanic distention  Psychiatric: Alert and oriented x3. MD CONSULTS/ASSESSMENT AND PLAN:  GASTROENTEROLOGY:  ASSESSMENT AND PLAN:  Will change from MiraLax to twice daily Lactulose as we continue to await biopsy results. INTERNAL MEDICINE:  1. Hypoglycemia due to insulin use with decreased p.o. intake. Patient switched to NPH. Sliding scale increased.  Will monitor closely. 2. SHARRON with acidosis. Creatinine 1.0  3. Hypertension. Blood pressure labile. Will restart lisinopril. MEDICATIONS:  Aspirin 81mg PO daily  Lipitor 20mg PO daily  Calcium Carb-Cholecalciferol 250-3.125mg-mcg 1 tablet PO daily  Neurontin 600mg PO TID  Chronulac 30g PO BID  Lisinopril 40mg PO daily  Invega 9mg PO daily  Protonix 40mg PO daily  Brilinta 90mg PO BID  Desyrel 200mg PO nightly  Klonopin 0.5mg PO TID PRN x 1  Percocet 7.5-325mg 1 tablet PO q8h PRN x 3  Apresoline 5mg IV q4h PRN x 1  Lovenox 40mg SC daily  Humalog SSI  Insulin NPH 4units SC now x 1  Insulin NPH 6units SC nightly  Insulin NPH 8units SC daily     ORDERS:  ADULT DIET; Regular; 4 carb choices (60 gm/meal); Low Potassium (Less than 3000 mg/day); Low Fiber  ADULT ORAL NUTRITION SUPPLEMENT; Breakfast, Lunch, Dinner; Diabetic Oral Supplement  POCT Glucose q4h        DATE: 03/05/2023 Care Day 8     PERTINENT UPDATES:  - Patient states she had a good BM this AM. No nausea vomiting. No fevers or chills. - Abdominal discomfort improved  - Tolerating oral intake  - Blood sugars remain labile. - Blood pressure is still elevated - BP: 165/75 149/67 160/70     VITALS:  T: 98.3, oral RR: 16 IL: 65 BP: 165/75 149/67 160/70 SPO2: 96% on RA     ABNL/PERTINENT LABS/RADIOLOGY/DIAGNOSTIC STUDIES:  Glucose, Random: 139 (H)  POC Glucose: 184 (H) 166 (H) 126 (H) 410 (H) 296 (H) 264 (H)     PHYSICAL EXAM:  Respiratory: Normal respiratory effort. Clear to auscultation, bilaterally without Rales/Wheezes/Rhonchi. Cardiovascular: Regular rate and rhythm with normal S1/S2 without murmurs, rubs or gallops. Abdomen: Soft nontender. Positive bowel sounds. Psychiatric: Alert and oriented x 3. MD CONSULTS/ASSESSMENT AND PLAN:  INTERNAL MEDICINE:  Assessment/Plan:  1. Hypoglycemia due to insulin use with decreased p.o. intake. NPH adjusted for elevated blood sugars during day. 2. Hypertension. Amlodipine started. GASTROENTEROLOGY:  ASSESSMENT AND PLAN:  Lactulose has improved the passage of stool and associated sxs. MEDICATIONS:  Norvasc 5mg PO daily  Aspirin 81mg PO daily  Lipitor 20mg PO daily  Calcium Carb-Cholecalciferol 250-3.125mg-mcg 1 tablet PO daily  Neurontin 600mg PO TID  Lactulose 30g PO BID  Invega 9mg PO daily  Protonix 40mg PO daily  Brilinta 90mg Po BID  Desyrel 200mg PO nightly  Tylenol 650mg PO q6h PRN x 1  Percocet 7.5-325mg 1 tablet PO q8h PRN x 2  Lovenox 40mg SC daily  Humalog SSI  Insulin NPH 12units SC daily  Insulin NPH 4units SC x 1  Insulin NPH 6units SC nightly     ORDERS:  ADULT DIET; Regular; 4 carb choices (60 gm/meal); Low Potassium (Less than 3000 mg/day); Low Fiber  ADULT ORAL NUTRITION SUPPLEMENT; Breakfast, Lunch, Dinner; Diabetic Oral Supplement  POCT Glucose        DATE: 03/06/2023 Care Day 9     PERTINENT UPDATES:  - Blood sugar labile. Minimal appetite. - Ongoing abdominal pain/cramping.  - Renea had multiple extremely large BMs  - Golytely started  - Pathology report showed invasive Adenocarcinoma  - Inpatient consult to General Surgery pending     VITALS:  T: 98.6, oral RR: 15 OR: 62 BP: 158/72 160/70 163/77 SPO2: 97% on RA     ABNL/PERTINENT LABS/RADIOLOGY/DIAGNOSTIC STUDIES:  POC Glucose: 367 (H) 221 (H) 148 (H) 319 (H) 189 (H) 325 (H)     PHYSICAL EXAM:  Respiratory: Normal respiratory effort. Clear to auscultation, bilaterally without Rales/Wheezes/Rhonchi. Cardiovascular: Regular rate and rhythm with normal S1/S2 without murmurs, rubs or gallops. Abdomen: Soft nontender. Positive bowel sounds. Psychiatric: Alert and oriented x3. MD CONSULTS/ASSESSMENT AND PLAN:  INTERNAL MEDICINE:  Assessment/Plan:  Colon Cancer: New diagnosis. Weight loss, abdominal pain, constipation and elevated CEA. Colonoscopy 2/27, pathology report now finalized and showed invasive Adenocarcinoma. Discussed with GI. General Surgery consulted.  Continue bowel regimen; increased to 1/2 gallon of Miralax per GI. SHARRON with acidosis. Improved. Continue to monitor renal function. Hypertension. Restarted lisinopril. GASTROENTEROLOGY:  ASSESSMENT AND PLAN:  Lactulose has improved the passage of stool and associated sxs until now. - Will start 1/2 gallon of Golytely  - I just called the path Lab and received the biopsy result: Invasive Adenocarcinoma  - Needs resection (inpatient vs outpatient). MEDICATIONS:  Norvasc 5mg PO daily  Aspirin 81mg PO daily  Lipitor 20mg PO daily  Calcium Carb-Cholecalciferol 250-3.125mg-mcg 1 tablet PO daily  Neurontin 600mg PO TID  Lactulose 30g PO BID x 1  Lisinopril 40mg PO daily  Invega 9mg PO daily  Protonix 40mg PO daily  GoLytely 2000ml PO x 1  Brilinta 90mg Po BID  Desyrel 200mg PO nightly  Klonopin 0.5mg PO TID PRN x 1  Percocet 7.5-325mg 1 tablet PO q8h PRN x 2  Insulin Regular 5units  IV x 1  Lovenox 40mg SC daily  Humalog SSI  Insulin NPH 14units SC daily  Insulin NPH 6units SC nightly     ORDERS:  ADULT DIET; Regular; 4 carb choices (60 gm/meal); Low Potassium (Less than 3000 mg/day); Low Fiber  ADULT ORAL NUTRITION SUPPLEMENT; Breakfast, Lunch, Dinner; Diabetic Oral Supplement  Basic Metabolic Panel w/ Reflex to MG daily x 3 days  Inpatient consult to General Surgery        DATE: 03/07/2023 Care Day 10     PERTINENT UPDATES:  - Blood sugars are uncontrolled. - General Surgery completed surgery that started off as robotic and was changed to midline incision - Status post Robotic converted to open right colectomy     VITALS:  T: 94, rectal RR: 16 ME: 59 then drops at 49 back again to 68 BP: 156/71 178/55 then trends down to 100/50 80/40 78/39 SPO2: 95% on RA     ABNL/PERTINENT LABS/RADIOLOGY/DIAGNOSTIC STUDIES:  BUN,BUNPL: 12  Creatinine: 1.1  Anion Gap: 7  Est, Glom Filt Rate: 56 !   Glucose, Random: 335 (H)  POC Glucose: 370 (H) 378 (H) 266 (H) 237 (H) 313 (H) 368 (H)     WBC: 3.7 (L)  RBC: 3.14 (L)  Hemoglobin Quant: 8.9 (L)  Hematocrit: 27.8 (L)  Platelet Count: 751     EKG 12 LEAD:  Normal sinus rhythm  Nonspecific ST and T wave abnormality Prolonged      PHYSICAL EXAM:  LUNGS: Resp effort easy and unlabored, no crackles or wheezing  CARDIOVASCULAR: NO JVD, regular rate   ABDOMEN: Normal bowel sounds, soft, non-distended, minimal tender  NEUROLOGIC: Awake, oriented to person, place, time     MD CONSULTS/ASSESSMENT AND PLAN:  GENERAL SURGERY OP NOTE:  Preop Dx: Right sided colon cancer  Postop Dx: Right sided colon cancer  Procedure: Robotic converted to open right colectomy  ASA Status: 3  Complications: None  Indications: 59 yo with right sided colon cancer     INTERNAL MEDICINE:  Assessment/Plan:  Colon Cancer: New diagnosis. Weight loss, abdominal pain, constipation and elevated CEA. Colonoscopy 2/27, pathology report now finalized and showed invasive Adenocarcinoma. Discussed with General Surgery, plan for OR today. MEDICATIONS:  Norvasc 5mg PO daily  Lipitor 20mg PO daily  Calcium Carb-Cholecalciferol 250-3.125mg-mcg 1 tablet PO daily  Neurontin 600mg PO TID x 2  Lisinopril 40mg PO daily  Invega 9mg PO daily  Protonix 40mg PO daily  Desyrel 200mg PO nightly  Percocet 7.5-325mg 1 tablet PO q8h PRN x 2  Cefazolin 2000mg IV on call to O.R.  Flagyl 500mg IV on call to O.R.  Zofran 4mg IV q15min PRN x 1     ORDERS:  ADULT DIET; Regular; 4 carb choices (60 gm/meal); Low Potassium (Less than 3000 mg/day);  Low Fiber  ADULT ORAL NUTRITION SUPPLEMENT; Breakfast, Lunch, Dinner; Diabetic Oral Supplement   POCT Glucose  Incentive spirometry PRN        DATE: 03/08/2023 Care Day 11     PERTINENT UPDATES:  - Patient sleepy and tired but awakens  - Reports surgical tenderness pain this morning.  - Hypotensive this morning - BP: 92/57 88/48 91/52   - NGT to suction  - On IV Cefazolin and IV Flagyl  - On IV fluids     VITALS:  T: 98.1, oral RR: 18 ND: 104 104 106 BP: 92/57 88/48 91/52 SPO2: 96% on RA     ABNL/PERTINENT LABS/RADIOLOGY/DIAGNOSTIC STUDIES:  Sodium: 135 (L)  Potassium: 5.8 (H)  Chloride: 98 (L)  CO2: 18 (L)  BUN,BUNPL: 19  Creatinine: 1.7 (H)  Anion Gap: 19 (H)  Est, Glom Filt Rate: 33 ! Glucose, Random: 415 (H)  POC Glucose: 372 (H) 360 (H) 304 (H)     PHYSICAL EXAM:  CONSTITUTIONAL: fatigued   LUNGS: no crackles or wheezing  ABDOMEN: hypoactive bowel sounds, soft, non-distended, tenderness noted midline incision, bryanna drain sanguinous   INCISION: scant amt drainage on dressing. MD CONSULTS/ASSESSMENT AND PLAN:  ONCOLOGY:  IMPRESSION/RECOMMENDATIONS:  Probable colon carcinoma:  -Less likely metastatic breast cancer  -Patient with a rising CEA  -Colonoscopy done several times with a poor prep  -PET scan demonstrated FDG avidity at the hepatic flexure  -Status post surgery with pathology pending  -She has an NG in place but appears to be doing well     Wilms tumor:  -Surgery at age 4  -Pathology not available     Breast carcinoma:  -Diagnosis 9/24/2014  -Right breast  -T1c, N1A, M0, grade 1  -ER positive, RI positive and HER2/edward negative  -Oncotype Dx of 8  -Patient stopped letrozole prematurely due to hot flashes     Dementia:  -Mild per granddaughter  -She does know where she is today     Bipolar disorder:  -Stable     Type 2 diabetes  -Managed by the hospitalist     GENERAL SURGERY:  ASSESSMENT AND PLAN:  60 y.o. female status post Robotic converted to open right colectomy  Unfortunately the pt's orders were not released by nursing last night, therefore the patient's post op orders were not initiated, she had no IVF, PCA was not started, antibiotics were not given.       GI: continue with ngt to suction today  : continue with petersen to monitor I/O  SHARRON: will give 500ml fluid bolus this AM, discuss with primary team.   CVD: mildly hypotensive, will see how responds with fluid bolus  Pain: PCA was not started, and with mild hypotension, will just give PRN dilaudid for now  Activity: OOB to chair  Path: pending GASTROENTEROLOGY:  Impression:  1. Invasive adenocarcinoma of the colon. S/p open right colectomy. Plan:  1. Awaiting surgical path. Oncology consulted and following. Post op care per surgery. Will follow peripherally. MEDICATIONS:  0.9% Sodium Chloride Bolus 500ml IV  0.9% Sodium Chloride Infusion 100ml/hr IV  Cefazolin 2000mg IV q8h x 1 changed to Cefazolin 2000mg IV q12h  Flagyl 500mg IV q8h  Aspirin 81mg PO daily  Lipitor 20mg PO daily  Neurontin 600mg PO TID  Lactulose 30g PO BID  Invega 9mg PO daily  Protonix 40mg PO daily  Desyrel 200mg PO nightly  Tylenol 650mg PO q6h PRN x 2  Lovenox 40mg SC daily  Humalog 0-4units SC nightly  Humalog SSI  Humalog 5 units SC x 1  Insulin NPH 14units SC daily  Insulin NPH 6units SC nightly     ORDERS:  POCT Glucose  Basic Metabolic Panel w/ Reflex to MG daily x 3 days  Drain care q shift  Nasogastric tube maintenance      PT/OT/SLP/CM ASSESSMENT OR NOTES:  :  Patient from home alone 3rd floor apartment, current rec home with 24 hr and [facility]. Patient declined by Katelyn Manzanares [facility], [facility], [facility], [place], and [facility]. Writer sent referral to [facility] - pending.         Diabetes, Hypoglycemia - Care Day 4 (3/1/2023) by Candice Vasquez RN       Review Status Review Entered   Completed 3/8/2023 0844       Created By   Candice Vasquez RN      Criteria Review      Care Day: 4 Care Date: 3/1/2023 Level of Care: Inpatient Floor    Guideline Day 2    Level Of Care    ( ) Floor to discharge    3/8/2023 8:44 AM EST by Han Barragan      Med Surg    Clinical Status    ( ) * Hemodynamic stability    3/8/2023 8:44 AM EST by Han Barragan      WI: 57  BP: 167/64 172/73 160/68    (X) * Mental status at baseline    3/8/2023 8:44 AM EST by Han Barragan      Alert and oriented x 3    (X) * Neurologic or other findings induced by hypoglycemia absent    3/8/2023 8:44 AM EST by Tien Campos, Debora      Neurologic: Neurovascularly intact without any focal sensory/motor deficits. ( ) * Serum glucose acceptable without parenteral support    3/8/2023 8:44 AM EST by Debora Olguin      Glucose, Random: 346 (H)    ( ) * Possible etiologies identified and outpatient follow-up acceptable    ( ) * Discharge plans and education understood    Activity    (X) * Ambulatory or acceptable for next level of care    3/8/2023 8:44 AM EST by Jet Martinez      STS transfers without AD and supervision and ambulated 300ft continuously with SBA. Routes    (X) * Oral hydration    3/8/2023 8:44 AM EST by Jet Martinez      PO Hydration    ( ) * Oral medications or regimen acceptable for next level of care    3/8/2023 8:44 AM EST by Veto Saint 7.5-325mg 1 tablet PO q8h PRN x 1    3/8/2023 8:44 AM EST by Jet Martinez      Aspirin 81mg PO daily  Lipitor 20mg PO daily  Calcium Carb-Cholecalciferol 250-3.125mg-mcg 1 tablet PO daily  Neurontin 600mg PO TID  Invega 9mg PO daily  Protonix 40mg PO daily  Brilinta 90mg PO BID  Desyrel 200mg PO nightly  Klonopin 0.5mg PO TID PRN x 1    (X) * Oral diet or acceptable for next level of care    3/8/2023 8:44 AM EST by Jet Martinez      ADULT DIET; Regular; 4 carb choices (60 gm/meal); Low Potassium (Less than 3000 mg/day);  Low Fiber  ADULT ORAL NUTRITION SUPPLEMENT; Breakfast, Lunch, Dinner; Standard High Calorie/High Protein Oral Supplement    Interventions    (X) Glucose monitoring    3/8/2023 8:44 AM EST by Jet Martinez      POC Glucose: 223 (H) 434 (H) 315 (H) 347 (H)    Medications    ( ) * Outpatient diabetic medication regimen established       Definitions for Care Day 4    Tachycardia absent    (X) Tachycardia absent, as indicated by  1 or more  of the following  (1) (2):       (X) Heart rate less than or equal to 100 beats per minute in adult or child 6 years or older       Hypotension absent    (X) Hypotension absent, as indicated by  1 or more  of the following  (1) (2) (3) (4):       (X) SBP greater than or equal to 90 mm Hg and without recent decrease greater than 40 mm Hg from       baseline in adult or child 10 years or older       * Milestone   Additional Notes   DATE: 03/01/2023 Care Day 4      PERTINENT UPDATES:   - No shortness of breath. No chest pain   - Complains of abdominal pain. - Patient is tolerating a low fiber diet. Had a solid BM yesterday. - Blood pressure elevated - BP: 167/64 172/73 160/68   - Respiratory rate drops to 8-9 then back to 18   - On Humalog SSI   - On IV Sodium Bicarbonate 100ml/hr      VITALS:   T: 98.9, oral RR: 8 9  [place]: 57 BP: 167/64 172/73 160/68 SPO2: 98% on RA      ABNL/PERTINENT LABS/RADIOLOGY/DIAGNOSTIC STUDIES:   BUN,BUNPL: 16   Creatinine: 1.3 (H)   Anion Gap: 10   Est, Glom Filt Rate: 51 ! RBC: 3.11 (L)   Hemoglobin Quant: 8.5 (L)   Hematocrit: 27.8 (L)   MCHC: 30.5 (L)      PHYSICAL EXAM:   HEENT: anicteric sclera, oropharyngeal membranes pink and moist.   Cardio: RRR   Lungs: non-labored, no respiratory distress   Abdomen: distended but soft, non tympanic, + mid abdominal tenderness. Extremities: no edema   Neuro: alert and oriented x 3      MD CONSULTS/ASSESSMENT AND PLAN:   GASTROENTEROLOGY:   Plan:   1. Will need surgical evaluation of she has signs of obstruction despite path results. 2. AXR in AM.      INTERNAL MEDICINE:   Assessment/Plan:   1. Hypoglycemia due to insulin use with decreased p.o. intake. Blood sugars have stabilized. We will continue to monitor. Patient on insulin sliding scale. We will reinstitute basal bolus regimen. 2.  SHARRON with acidosis. Patient was on a bicarb drip. We will discontinue drip and recheck BMP in afternoon. 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 3.  Weight loss. Patient with abnormal CEA. Patient will follow with GI for abnormal C-scope. 4.  Hypertension. Lisinopril on hold due to SHARRON.  Blood pressure elevated. MEDICATIONS:   Lovenox 40mg SC daily   Lantus 16units SC nightly   Humalog SSI   Insulin NPH 8units SC x 1   Sodium Bicarbonate 100ml/hr IV   Bactroban 2% Ointment NA BID x 1      ORDERS:   POCT Glucose q4h      PT/OT/SLP/CM ASSESSMENT OR NOTES:   PT:   Patient reports that she does own a SPC and RW that she uses PRN when her balance is feeling off at home and stated that she would use SPC initally when home. Pt appears to be functioning at her baseline and no longer needs skilled therapy in the acute setting. Recommend home with 24hr assist for home and step negotiation and HHPT upon d/c.

## 2023-03-13 NOTE — PLAN OF CARE
Problem: Pain  Goal: Verbalizes/displays adequate comfort level or baseline comfort level  3/13/2023 0810 by Reuben Davis RN  Outcome: Progressing  Flowsheets (Taken 3/13/2023 0810)  Verbalizes/displays adequate comfort level or baseline comfort level:   Encourage patient to monitor pain and request assistance   Assess pain using appropriate pain scale   Administer analgesics based on type and severity of pain and evaluate response   Implement non-pharmacological measures as appropriate and evaluate response     Problem: Skin/Tissue Integrity  Goal: Absence of new skin breakdown  Description: 1. Monitor for areas of redness and/or skin breakdown  2. Assess vascular access sites hourly  3. Every 4-6 hours minimum:  Change oxygen saturation probe site  4. Every 4-6 hours:  If on nasal continuous positive airway pressure, respiratory therapy assess nares and determine need for appliance change or resting period.   3/13/2023 0810 by Reuben Davis RN  Outcome: Progressing

## 2023-03-13 NOTE — PROGRESS NOTES
Pt is alert and oriented. VSS. RA. SpO2 90% on RA. Pt rates pain 10/10. Pt given PRN pain medication per MAR. Pt with midline incisions with staples, CDI,TORSTEN. Pt with 4 lap incisions CDI, TORSTEN. HEATHER drain in place with serous drainage. Dooley cath in place at this time draining clear yellow urine. Shift assessment completed and documented. Call light within reach. Bed side table within reach. Wheels locked. Bed in lowest position. Bed check in place. Pt instructed to call out for assistance. Pt expressesed understanding & calls out appropritately. All care per orders.  Electronically signed by Maya Camara RN on 3/13/2023 at 10:14 AM

## 2023-03-13 NOTE — DISCHARGE INSTRUCTIONS
From Kidney doctor    Please do not take pain medicines without physician approval, since many of them can harm kidney. Acetaminophen or Tylenol is safe for kidney in recommended dosage, as well as Aspirin up to 325 mg a day. However, do not take Motrin, Aleve, Advil, Ibuprofen, celecoxib, meloxicam, high dose aspirin etc. The list is not all inclusive. Call us at [phone] if you have any questions. Please check your blood pressure regularly, three times a day if possible, and bring the diary with you during your next visit with us. Please call us at [phone] if your Blood pressure is too high, or too low or if you feels lightheaded. Your medicine dose may need to be adjusted    You were seen by Dr Amita Callaway MD, nephrologist for chronic kidney disease stage II. As of 3/13/23- your kidney is functioning at about 60 or higher %. We need to work together to save the remaining kidney function to avoid or delay need for dialysis.  Please call office at [phone] for follow up visits

## 2023-03-13 NOTE — PROGRESS NOTES
Petersen cath removed per order by student nurse and instructor at 0099. Pt has voided once since petersen cath removal that was unmeasurable.

## 2023-03-14 LAB
ANION GAP SERPL CALCULATED.3IONS-SCNC: 1 MMOL/L (ref 3–16)
BASOPHILS # BLD: 0 K/UL (ref 0–0.2)
BASOPHILS NFR BLD: 0.4 %
BUN SERPL-MCNC: 15 MG/DL (ref 7–20)
CALCIUM SERPL-MCNC: 7.8 MG/DL (ref 8.3–10.6)
CHLORIDE SERPL-SCNC: 109 MMOL/L (ref 99–110)
CO2 SERPL-SCNC: 22 MMOL/L (ref 21–32)
CREAT SERPL-MCNC: 1.3 MG/DL (ref 0.6–1.2)
DEPRECATED RDW RBC AUTO: 14.7 % (ref 12.4–15.4)
EOSINOPHIL # BLD: 0.1 K/UL (ref 0–0.6)
EOSINOPHIL NFR BLD: 0.8 %
GFR SERPLBLD CREATININE-BSD FMLA CKD-EPI: 46 ML/MIN/{1.73_M2}
GLUCOSE BLD-MCNC: 252 MG/DL (ref 70–99)
GLUCOSE BLD-MCNC: 323 MG/DL (ref 70–99)
GLUCOSE BLD-MCNC: 323 MG/DL (ref 70–99)
GLUCOSE BLD-MCNC: 419 MG/DL (ref 70–99)
GLUCOSE SERPL-MCNC: 443 MG/DL (ref 70–99)
HCT VFR BLD AUTO: 26 % (ref 36–48)
HGB BLD-MCNC: 8.3 G/DL (ref 12–16)
LYMPHOCYTES # BLD: 1.1 K/UL (ref 1–5.1)
LYMPHOCYTES NFR BLD: 17.5 %
MCH RBC QN AUTO: 29 PG (ref 26–34)
MCHC RBC AUTO-ENTMCNC: 32.1 G/DL (ref 31–36)
MCV RBC AUTO: 90.2 FL (ref 80–100)
MONOCYTES # BLD: 0.7 K/UL (ref 0–1.3)
MONOCYTES NFR BLD: 11.4 %
NEUTROPHILS # BLD: 4.5 K/UL (ref 1.7–7.7)
NEUTROPHILS NFR BLD: 69.9 %
PERFORMED ON: ABNORMAL
PLATELET # BLD AUTO: 194 K/UL (ref 135–450)
PMV BLD AUTO: 9.1 FL (ref 5–10.5)
POTASSIUM SERPL-SCNC: 5 MMOL/L (ref 3.5–5.1)
PROCALCITONIN SERPL IA-MCNC: 3.88 NG/ML (ref 0–0.15)
RBC # BLD AUTO: 2.88 M/UL (ref 4–5.2)
SODIUM SERPL-SCNC: 132 MMOL/L (ref 136–145)
WBC # BLD AUTO: 6.4 K/UL (ref 4–11)

## 2023-03-14 PROCEDURE — 6370000000 HC RX 637 (ALT 250 FOR IP): Performed by: SURGERY

## 2023-03-14 PROCEDURE — 97110 THERAPEUTIC EXERCISES: CPT

## 2023-03-14 PROCEDURE — 97530 THERAPEUTIC ACTIVITIES: CPT

## 2023-03-14 PROCEDURE — 6370000000 HC RX 637 (ALT 250 FOR IP): Performed by: NURSE PRACTITIONER

## 2023-03-14 PROCEDURE — 1200000000 HC SEMI PRIVATE

## 2023-03-14 PROCEDURE — 80048 BASIC METABOLIC PNL TOTAL CA: CPT

## 2023-03-14 PROCEDURE — 85025 COMPLETE CBC W/AUTO DIFF WBC: CPT

## 2023-03-14 PROCEDURE — 84145 PROCALCITONIN (PCT): CPT

## 2023-03-14 PROCEDURE — 97116 GAIT TRAINING THERAPY: CPT

## 2023-03-14 PROCEDURE — APPSS30 APP SPLIT SHARED TIME 16-30 MINUTES: Performed by: CLINICAL NURSE SPECIALIST

## 2023-03-14 PROCEDURE — 2580000003 HC RX 258: Performed by: SURGERY

## 2023-03-14 PROCEDURE — 36415 COLL VENOUS BLD VENIPUNCTURE: CPT

## 2023-03-14 PROCEDURE — 6370000000 HC RX 637 (ALT 250 FOR IP): Performed by: INTERNAL MEDICINE

## 2023-03-14 PROCEDURE — 6360000002 HC RX W HCPCS: Performed by: INTERNAL MEDICINE

## 2023-03-14 RX ORDER — TRAZODONE HYDROCHLORIDE 50 MG/1
50 TABLET ORAL NIGHTLY
Status: DISCONTINUED | OUTPATIENT
Start: 2023-03-14 | End: 2023-03-15 | Stop reason: HOSPADM

## 2023-03-14 RX ORDER — INSULIN GLARGINE 100 [IU]/ML
30 INJECTION, SOLUTION SUBCUTANEOUS DAILY
Status: DISCONTINUED | OUTPATIENT
Start: 2023-03-15 | End: 2023-03-15 | Stop reason: HOSPADM

## 2023-03-14 RX ORDER — GABAPENTIN 100 MG/1
200 CAPSULE ORAL 3 TIMES DAILY
Status: DISCONTINUED | OUTPATIENT
Start: 2023-03-14 | End: 2023-03-15 | Stop reason: HOSPADM

## 2023-03-14 RX ADMIN — Medication 1 TABLET: at 07:42

## 2023-03-14 RX ADMIN — ATORVASTATIN CALCIUM 20 MG: 10 TABLET, FILM COATED ORAL at 07:42

## 2023-03-14 RX ADMIN — GABAPENTIN 200 MG: 100 CAPSULE ORAL at 20:22

## 2023-03-14 RX ADMIN — ACETAMINOPHEN 325MG 650 MG: 325 TABLET ORAL at 17:17

## 2023-03-14 RX ADMIN — GABAPENTIN 200 MG: 100 CAPSULE ORAL at 14:23

## 2023-03-14 RX ADMIN — INSULIN LISPRO 8 UNITS: 100 INJECTION, SOLUTION INTRAVENOUS; SUBCUTANEOUS at 16:06

## 2023-03-14 RX ADMIN — INSULIN LISPRO 16 UNITS: 100 INJECTION, SOLUTION INTRAVENOUS; SUBCUTANEOUS at 07:37

## 2023-03-14 RX ADMIN — ENOXAPARIN SODIUM 40 MG: 100 INJECTION SUBCUTANEOUS at 07:41

## 2023-03-14 RX ADMIN — TRAMADOL HYDROCHLORIDE 50 MG: 50 TABLET, COATED ORAL at 17:16

## 2023-03-14 RX ADMIN — INSULIN LISPRO 12 UNITS: 100 INJECTION, SOLUTION INTRAVENOUS; SUBCUTANEOUS at 11:09

## 2023-03-14 RX ADMIN — ACETAMINOPHEN 325MG 650 MG: 325 TABLET ORAL at 22:53

## 2023-03-14 RX ADMIN — INSULIN LISPRO 4 UNITS: 100 INJECTION, SOLUTION INTRAVENOUS; SUBCUTANEOUS at 20:24

## 2023-03-14 RX ADMIN — ASPIRIN 81 MG: 81 TABLET, COATED ORAL at 07:42

## 2023-03-14 RX ADMIN — SODIUM CHLORIDE, PRESERVATIVE FREE 10 ML: 5 INJECTION INTRAVENOUS at 20:25

## 2023-03-14 RX ADMIN — PANTOPRAZOLE SODIUM 40 MG: 40 TABLET, DELAYED RELEASE ORAL at 07:42

## 2023-03-14 RX ADMIN — AMLODIPINE BESYLATE 5 MG: 5 TABLET ORAL at 07:42

## 2023-03-14 RX ADMIN — TRAZODONE HYDROCHLORIDE 50 MG: 50 TABLET ORAL at 20:22

## 2023-03-14 RX ADMIN — INSULIN GLARGINE 15 UNITS: 100 INJECTION, SOLUTION SUBCUTANEOUS at 07:38

## 2023-03-14 RX ADMIN — SODIUM CHLORIDE, PRESERVATIVE FREE 10 ML: 5 INJECTION INTRAVENOUS at 07:44

## 2023-03-14 RX ADMIN — TRAMADOL HYDROCHLORIDE 50 MG: 50 TABLET, COATED ORAL at 10:37

## 2023-03-14 RX ADMIN — PALIPERIDONE 9 MG: 3 TABLET, EXTENDED RELEASE ORAL at 07:42

## 2023-03-14 ASSESSMENT — PAIN DESCRIPTION - LOCATION
LOCATION: ABDOMEN
LOCATION: ABDOMEN
LOCATION: HEAD
LOCATION: ABDOMEN

## 2023-03-14 ASSESSMENT — PAIN DESCRIPTION - DESCRIPTORS
DESCRIPTORS: ACHING
DESCRIPTORS: ACHING

## 2023-03-14 ASSESSMENT — PAIN SCALES - WONG BAKER
WONGBAKER_NUMERICALRESPONSE: 0

## 2023-03-14 ASSESSMENT — PAIN SCALES - GENERAL
PAINLEVEL_OUTOF10: 7
PAINLEVEL_OUTOF10: 10
PAINLEVEL_OUTOF10: 7
PAINLEVEL_OUTOF10: 8

## 2023-03-14 ASSESSMENT — PAIN DESCRIPTION - ORIENTATION: ORIENTATION: MID

## 2023-03-14 NOTE — PLAN OF CARE
Problem: Pain  Goal: Verbalizes/displays adequate comfort level or baseline comfort level  3/14/2023 0815 by Sherri Luna, RN  Outcome: Progressing  Flowsheets (Taken 3/14/2023 0815)  Verbalizes/displays adequate comfort level or baseline comfort level:   Encourage patient to monitor pain and request assistance   Assess pain using appropriate pain scale   Administer analgesics based on type and severity of pain and evaluate response   Implement non-pharmacological measures as appropriate and evaluate response     Problem: Safety - Adult  Goal: Free from fall injury  3/14/2023 0815 by Sherri Luna RN  Outcome: Progressing  Flowsheets (Taken 3/14/2023 0815)  Free From Fall Injury:   Instruct family/caregiver on patient safety   Based on caregiver fall risk screen, instruct family/caregiver to ask for assistance with transferring infant if caregiver noted to have fall risk factors

## 2023-03-14 NOTE — PROGRESS NOTES
Pt is alert and oriented. VSS. RA. Pt c/o 8/10 pain. Pt given PRN pain medication per MAR. Midline incisions CDI, TORSTEN with staples. 4 lap incisions, CDI, TORSTEN. HEATHER drain in place. Shift assessment completed and documented. .Electronically signed by Shyanne Nieto RN on 3/14/2023 at 7:35 PM

## 2023-03-14 NOTE — PROGRESS NOTES
Interval History and plan:     Creatinine went up slightly to 1.3  Sodium low at 132 likely both due to hyperglycemia  Her creatinine is worse today likely due to hyperglycemia leading to dehydration and also low sodium due to pseudohyponatremia     Plan:  Asked to drink plenty of water  In the meantime we will touch base with primary team to continue to help control glucose that will help home numbers also  Bicarb is normal                       Assessment :     Acute Kidney Injury  Creatinine peaked to 2.3-2 on consult  Creatinine [id number]  She is known to have CKD 2 baseline-likely due to nephrectomy, recurrent SHARRON, diabetes  She has a history of Wilms tumor and nephrectomy  Had SHARRON on 2/21-seen by me again. Her protein creatinine ratio was normal  She also has diabetes mellitus on insulin-which is a risk factor for CKD         Hypertension   BP: (137)/(72)  Heart Rate:  [84]   BP goal inpatient [id number] systolic inpatient  Blood pressure is relatively soft  She is also known to have atrial fibrillation        [facility] would like to thank Bridger Murcia MD   for opportunity to serve this patient      Please call with questions at-   24 Hrs Answering service [phone] or  7 am- 5 pm via Perfect serve or cell phone  Michelle Russo MD          CC/reason for consult :     SHARRON     HPI :     Magy Freeman is a 60 y.o. female presented to   the hospital on 2/26/2023 with altered mental status. She was unresponsive on presentation. She was getting bowel preparation for colonoscopy and fell in the bathroom preceded by dizziness weakness and swelling. She also lost her consciousness. She was taking short acting insulin in addition to Lantus despite of being NPO. She is known to have diabetes on insulin. EMS was called. She was found to have blood sugar of 40 which was treated in route and she was brought to the emergency room.   She had a colonoscopy done inpatient and had a biopsy done which showed lesion in the hepatic flexure came back positive for colon cancer. She had a colon surgery done this hospitalization  Her course of hospitalization is complicated by SHARRON. ROS:         positives in bold   Constitutional:  fever, chills, weakness, weight change, fatigue  Skin:  rash, pruritus, hair loss, bruising, dry skin, petechiae  Head, Face, Neck   headaches, swelling,  cervical adenopathy  Respiratory: shortness of breath, cough, or wheezing  Cardiovascular: chest pain, palpitations, dizzy, edema  Gastrointestinal: nausea, vomiting, diarrhea, constipation,belly pain    Yellow skin, blood in stool  Musculoskeletal:  back pain, muscle weakness, gait problems,       joint pain or swelling. Genitourinary:  dysuria, poor urine flow, flank pain, blood in urine  Neurologic:  vertigo, TIA'S, syncope, seizures, focal weakness  Psychosocial:  insomnia, anxiety, or depression. Additional positive findings:                    All other remaining systems are negative or unable to obtain        PMH/PSH/SH/Family History:     Past Medical History:   Diagnosis Date    Abnormal brain MRI 7/20/2017    Partially empty sella and minimal chronic small vessel ischemic disease    Acute bilateral low back pain without sciatica 11/2/2016    SHARRON (acute kidney injury) ([facility] 7/5/2017    Arthritis     back    Bipolar disorder ([facility] 10/18/2008    CAD (coronary artery disease)     stent placed 6/8/20    Cancer ([facility] 2015    bilateral breast:s/p lumpectomy/radiation:under care care of breast specialist:Dr. Boone     Carotid stenosis, bilateral:<50%:per US 7/2016 7/15/2016    Carpal tunnel syndrome 10/18/2008    Cervical cancer screening 2014    Nml per pt'.     Coronary artery disease of native artery of native heart with stable angina pectoris ([facility] 6/9/2020    DDD (degenerative disc disease), lumbar 7/18/2018    Depression     under care of pschiatrist:Dr. 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 myelopathy or radiculopathy 3/10/2017    Transient cerebral ischemia 07/15/2016    TIA:7/10/16    Unspecified cerebral artery occlusion with cerebral infarction     TIA       Past Surgical History:   Procedure Laterality Date    BREAST LUMPECTOMY  2015    Bilateral:breast cancer    CARDIAC CATHETERIZATION  06/08/2020    Dr. Meseret Foote), DAYANA to Diag 1    COLONOSCOPY N/A 2/1/2021    COLONOSCOPY DIAGNOSTIC performed by Willi Mccabe MD at [facility] N/A 2/27/2023    COLONOSCOPY WITH BIOPSY performed by Willi Mccabe MD at [place] [address]  2/3/2021    CT BONE MARROW BIOPSY 2/3/2021 Candido Mendenhall MD [id number] [place] [address] CT SCAN    HEMICOLECTOMY N/A 3/7/2023    ROBOTIC CONVERTED TO OPEN RIGHT COLECTOMY performed by Janneth Marrufo MD at [facility] ([facility])      Benign:no cervical cancer per pt'    KIDNEY REMOVAL      right    OTHER SURGICAL HISTORY Right     orif right ankle    TEMPORAL ARTERY BIOPSY Right 8/9/2021    RIGHT TEMPORAL ARTERY BIOPSY LIGATION performed by Ritesh Villaseñor MD at [address]  1/29/2021    EGD BIOPSY performed by Willi Mccabe MD at [address] 12/15/2022    EGD BIOPSY performed by Willi Mccabe MD at [facility] ENDOSCOPY        reports that she quit smoking about 8 years ago. Her smoking use included cigarettes and cigars. She has a 10.00 pack-year smoking history. She has never used smokeless tobacco. She reports that she does not drink alcohol and does not use drugs. family history includes Cancer in her father and mother. Medication:     Current Facility-Administered Medications: insulin glargine (LANTUS) injection vial 15 Units, 15 Units, SubCUTAneous, Daily  0.9 % sodium chloride infusion, , IntraVENous, PRN  insulin lispro (HUMALOG) injection vial 0-16 Units, 0-16 Units, SubCUTAneous, TID WC  insulin lispro (HUMALOG) injection vial 0-4 Units, 0-4 Units, SubCUTAneous, Nightly  enoxaparin (LOVENOX) injection 40 mg, 40 mg, SubCUTAneous, Daily  amLODIPine (NORVASC) tablet 5 mg, 5 mg, Oral, Daily  traMADol (ULTRAM) tablet 50 mg, 50 mg, Oral, Q6H PRN  acetaminophen (TYLENOL) tablet 650 mg, 650 mg, Oral, Q4H PRN **OR** acetaminophen (TYLENOL) suppository 650 mg, 650 mg, Rectal, Q4H PRN  insulin regular (HUMULIN R;NOVOLIN R) injection 10 Units, 10 Units, SubCUTAneous, Once  hydrALAZINE (APRESOLINE) injection 5 mg, 5 mg, IntraVENous, Q4H PRN  glucose chewable tablet 16 g, 4 tablet, Oral, PRN  dextrose bolus 10% 125 mL, 125 mL, IntraVENous, PRN **OR** dextrose bolus 10% 250 mL, 250 mL, IntraVENous, PRN  glucagon (rDNA) injection 1 mg, 1 mg, IntraMUSCular, PRN  dextrose 10 % infusion, , IntraVENous, Continuous PRN  aspirin EC tablet 81 mg, 81 mg, Oral, Daily  atorvastatin (LIPITOR) tablet 20 mg, 20 mg, Oral, Daily  clonazePAM (KLONOPIN) tablet 0.5 mg, 0.5 mg, Oral, TID PRN  calcium carb-cholecalciferol 250-3. 125 MG-MCG per tab 1 tablet, 1 tablet, Oral, Daily  [Held by provider] gabapentin (NEURONTIN) capsule 600 mg, 600 mg, Oral, TID  paliperidone (INVEGA) extended release tablet 9 mg, 9 mg, Oral, QAM  pantoprazole (PROTONIX) tablet 40 mg, 40 mg, Oral, Daily  [Held by provider] traZODone (DESYREL) tablet 200 mg, 200 mg, Oral, Nightly  sodium chloride flush 0.9 % injection 5-40 mL, 5-40 mL, IntraVENous, 2 times per day  sodium chloride flush 0.9 % injection 5-40 mL, 5-40 mL, IntraVENous, PRN  0.9 % sodium chloride infusion, , IntraVENous, PRN  ondansetron (ZOFRAN-ODT) disintegrating tablet 4 mg, 4 mg, Oral, Q8H PRN **OR** ondansetron (ZOFRAN) injection 4 mg, 4 mg, IntraVENous, Q6H PRN  polyethylene glycol (GLYCOLAX) packet 17 g, 17 g, Oral, Daily PRN       Vitals :     Vitals:    03/14/23 0735   BP: 137/72   Pulse: 84   Resp: 16   Temp: 98.9 °F (37.2 °C)   SpO2: 93%       I & O :       Intake/Output Summary (Last 24 hours) at 3/14/2023 0935  Last data filed at 3/14/2023 0826  Gross per 24 hour   Intake 620 ml   Output 780 ml   Net -160 ml          Physical Examination :     General appearance:  in NAD, fully alert and oriented. Comfortable. HEENT: EOM intact, no icterus. Trachea is midline. Neck : No mass, appears symmetrical, no JVD   Respiratory: Respiratory effort RR no edema  Abdomen: No visible mass or tenderness  Musculoskeletal:  No clubbing,cyanosis, joints with no swelling or deformity. Skin:no rashes, ulcers, induration, no jaundice. Neuro: face symmetric, no focal deficits. Appropriate responses.  CN 2-12 grossly intact         LABS:     Recent Labs     03/12/23  0515 03/12/23  1732 03/13/23  0514 03/14/23  0721   WBC 4.8  --  5.2 6.4   HGB 6.2* 8.8* 8.4* 8.3*   HCT 19.9* 27.3* 26.0* 26.0*     --  172 194       Recent Labs     03/11/23  1210 03/12/23  0515 03/13/23  0514 03/14/23  0721   NA  --  138 136 132*   K  --  4.5 4.5 5.0   CL  --  111* 110 109   CO2  --  21 21 22   BUN  --  21* 13 15   CREATININE  --  1.2 1.0 1.3*   GLUCOSE  --  347* 259* 443*   MG 2.50*  --  1.90  --    PHOS 1.5* 2.7 1.5*  --               Thanks,   [facility]  [address] [place] [address]  Office: [phone]  Fax: [phone]

## 2023-03-14 NOTE — PLAN OF CARE
Problem: Discharge Planning  Goal: Discharge to home or other facility with appropriate resources  Outcome: Progressing     Problem: Pain  Goal: Verbalizes/displays adequate comfort level or baseline comfort level  Outcome: Progressing     Problem: Chronic Conditions and Co-morbidities  Goal: Patient's chronic conditions and co-morbidity symptoms are monitored and maintained or improved  Outcome: Progressing     Problem: Gastrointestinal - Adult  Goal: Minimal or absence of nausea and vomiting  Outcome: Progressing  Goal: Maintains or returns to baseline bowel function  Outcome: Progressing  Goal: Maintains adequate nutritional intake  Outcome: Progressing     Problem: Metabolic/Fluid and Electrolytes - Adult  Goal: Glucose maintained within prescribed range  Outcome: Progressing  Goal: Electrolytes maintained within normal limits  Outcome: Progressing     Problem: Safety - Adult  Goal: Free from fall injury  Outcome: Progressing     Problem: Nutrition Deficit:  Goal: Optimize nutritional status  Outcome: Progressing     Problem: Cardiovascular - Adult  Goal: Maintains optimal cardiac output and hemodynamic stability  Outcome: Progressing  Flowsheets (Taken 3/13/2023 2126)  Maintains optimal cardiac output and hemodynamic stability: Monitor blood pressure and heart rate  Goal: Absence of cardiac dysrhythmias or at baseline  Outcome: Progressing  Flowsheets (Taken 3/13/2023 2126)  Absence of cardiac dysrhythmias or at baseline: Monitor cardiac rate and rhythm     Problem: Hematologic - Adult  Goal: Maintains hematologic stability  Outcome: Progressing     Problem: Skin/Tissue Integrity  Goal: Absence of new skin breakdown  Description: 1. Monitor for areas of redness and/or skin breakdown  2. Assess vascular access sites hourly  3. Every 4-6 hours minimum:  Change oxygen saturation probe site  4.   Every 4-6 hours:  If on nasal continuous positive airway pressure, respiratory therapy assess nares and determine need for appliance change or resting period.   Outcome: Progressing

## 2023-03-14 NOTE — PROGRESS NOTES
Comprehensive Nutrition Assessment    Type and Reason for Visit:  Reassess    Nutrition Recommendations/Plan:   Modify diet to Easy to chew 4 carb choice diet  Continue Glucerna x 3 daily  Obtain updated wt  RD to provide nutrition education at next follow up  Encourage PO intake  Monitor nutrition adequacy, pertinent labs, bowel habits, wt changes, and clinical progress     Malnutrition Assessment:  Malnutrition Status: At risk for malnutrition (Comment) (03/02/23 1441)    Context:  Acute Illness     Findings of the 6 clinical characteristics of malnutrition:  Energy Intake:  Mild decrease in energy intake (Comment)  Muscle Mass Loss: Moderate muscle mass loss Clavicles (pectoralis & deltoids), Temples (temporalis)    Nutrition Assessment:    Follow up: Pt is nutritionally improving AEB increased PO intake. NGT to suction removed on 3/10. Diet advanced from clear liquids to easy to chew on 3/12. Pt reports tolerating diet well. Pt reports good appetite. Pt seen eating lunch at time of visit, was eating broccoli and chicken pot pie. PO intakes % per EMR. Pt stated \"loving glucerna\". Will continue to send on meal trays. Updated wt ordered. No c/o nausea or constipation. Blood sugar elevated with bouts of hyperglycemia, will modify diet to easy to chew 4 carb choice diet and continue pt with diabetic ONS. Encouraged PO intake. Will continue to monitor. Nutrition Related Findings:    Na 132. Phos 1.5 on 3/13. -419 x 24 hr. A1C 8.1% 2/28. Closed/suction drain medial RLQ bulb. + BM 3/13.  Wound Type: Surgical Incision       Current Nutrition Intake & Therapies:    Average Meal Intake: %, 51-75%  Average Supplements Intake: Unable to assess  ADULT ORAL NUTRITION SUPPLEMENT; Breakfast, Lunch, Dinner; Diabetic Oral Supplement  ADULT DIET; Easy to Chew; 4 carb choices (60 gm/meal)    Anthropometric Measures:  Height: 5' 3\" (160 cm)  Ideal Body Weight (IBW): 115 lbs (52 kg)       Current Body Weight: 123 lb (55.8 kg), 107 % IBW. Weight Source: Standing Scale  Current BMI (kg/m2): 21.8        Weight Adjustment For: No Adjustment                 BMI Categories: Normal Weight (BMI 18.5-24. 9)    Estimated Daily Nutrient Needs:  Energy Requirements Based On: Kcal/kg (25-30 kcals/kg)  Weight Used for Energy Requirements: Current (55 kg)  Energy (kcal/day): 8475-9646  Weight Used for Protein Requirements: Current (1-1.2 g/kg)  Protein (g/day): 55-66 g  Method Used for Fluid Requirements: 1 ml/kcal  Fluid (ml/day):      Nutrition Diagnosis:   Increased nutrient needs related to inadequate protein-energy intake as evidenced by moderate muscle loss, poor intake prior to admission, other (comment) (extended hospital stay)    Nutrition Interventions:   Food and/or Nutrient Delivery: Modify Current Diet, Continue Oral Nutrition Supplement  Nutrition Education/Counseling: Education needed  Coordination of Nutrition Care: Continue to monitor while inpatient       Goals:  Previous Goal Met: Progressing toward Goal(s)  Goals: PO intake 50% or greater, prior to discharge  Specify Other Goals: Initiate most appropriate form of nutrition per General Surgery    Nutrition Monitoring and Evaluation:   Behavioral-Environmental Outcomes: None Identified  Food/Nutrient Intake Outcomes: Diet Advancement/Tolerance, Food and Nutrient Intake, Supplement Intake  Physical Signs/Symptoms Outcomes: Weight, Nutrition Focused Physical Findings, Biochemical Data, GI Status    Discharge Planning:    Continue Oral Nutrition Supplement, Continue current diet     [address] Karla Aggarwal: Office: [phone]; [address]: [facility]

## 2023-03-14 NOTE — PROGRESS NOTES
[facility] GENERAL SURGERY    Surgery Progress Note           POD # 7    PATIENT NAME: Jasmina Walker     TODAY'S DATE: 3/14/2023    INTERVAL HISTORY:    Pt awake and alert - tolerating diet. Reports she is moving her bowels. OBJECTIVE:   VITALS:  /72   Pulse 84   Temp 98.9 °F (37.2 °C) (Oral)   Resp 16   Ht 5' 3\" (1.6 m)   Wt 123 lb (55.8 kg)   SpO2 93%   BMI 21.79 kg/m²     INTAKE/OUTPUT:    I/O last 3 completed shifts: In: 1466.2 [P.O.:840; I.V.:241.9; IV Piggyback:384.3]  Out: 4963 [Urine:1450; Drains:380]  I/O this shift:  In: 130 [P.O.:120; I.V.:10]  Out: 40 [Drains:40]              CONSTITUTIONAL:  awake, alert  LUNGS:  no crackles or wheezing  ABDOMEN:   + bowel sounds, soft, non-distended, tenderness noted midline incision mild, bryanna drain serous  INCISION: no erythema or drainage, staples intact    Data:  CBC:   Recent Labs     03/12/23  0515 03/12/23  1732 03/13/23  0514 03/14/23  0721   WBC 4.8  --  5.2 6.4   HGB 6.2* 8.8* 8.4* 8.3*   HCT 19.9* 27.3* 26.0* 26.0*     --  172 194       BMP:    Recent Labs     03/12/23  0515 03/13/23  0514 03/14/23  0721    136 132*   K 4.5 4.5 5.0   * 110 109   CO2 21 21 22   BUN 21* 13 15   CREATININE 1.2 1.0 1.3*   GLUCOSE 347* 259* 443*       Hepatic: No results for input(s): AST, ALT, ALB, BILITOT, ALKPHOS in the last 72 hours. Mag:      Recent Labs     03/11/23  1210 03/13/23  0514   MG 2.50* 1.90        Phos:     Recent Labs     03/11/23  1210 03/12/23  0515 03/13/23  0514   PHOS 1.5* 2.7 1.5*        INR:   No results for input(s): INR in the last 72 hours. ASSESSMENT AND PLAN:  60 y.o. female status post Robotic converted to open right colectomy      GI: continue with diet as adam  SHARRON: nephrology managing  Pain: continue with current orders  Activity: PT/OT    Dispo: per primary team - when medically ready.  [id number] Katarzyna Suarez from surgery standpoint    Electronically signed by SARTHAK Mccollum - CNP     Surgery Staff    I have examined this patient, and read and agree with the note by Olena Burdick CNP from today; more than half of the total time was spent by me on the encounter. Doing well from surgical perspective. Should be ready for D/C soon.     Will follow    Wild Reza MD

## 2023-03-14 NOTE — CARE COORDINATION
Hospital day 16: Patient on C3 care managed by IM, Nephrology and General Surgery. Patient rec SNF agreeable Accepted at [facility] [place] [facility], [address] obtained. Attempted to call rico VM not set up. Spoke with CNP will hold this date re BS anticipate dc 03/15. IBETH Clayton

## 2023-03-14 NOTE — PROGRESS NOTES
Hospitalist Progress Note      PCP: Anthony Deleon    Date of Admission: 2/26/2023    Chief Complaint:   Hypoglycemia     Subjective:    NAD, asking for pain med adjustment, mentation improved, afebrile    Medications:  Reviewed    Infusion Medications    sodium chloride      dextrose      sodium chloride 25 mL (03/13/23 8072)     Scheduled Medications    [START ON 3/15/2023] insulin glargine  30 Units SubCUTAneous Daily    gabapentin  200 mg Oral TID    insulin lispro  0-16 Units SubCUTAneous TID WC    insulin lispro  0-4 Units SubCUTAneous Nightly    enoxaparin  40 mg SubCUTAneous Daily    amLODIPine  5 mg Oral Daily    insulin regular  10 Units SubCUTAneous Once    aspirin  81 mg Oral Daily    atorvastatin  20 mg Oral Daily    calcium carb-cholecalciferol  1 tablet Oral Daily    paliperidone  9 mg Oral QAM    pantoprazole  40 mg Oral Daily    [Held by provider] traZODone  200 mg Oral Nightly    sodium chloride flush  5-40 mL IntraVENous 2 times per day     PRN Meds: sodium chloride, traMADol, acetaminophen **OR** acetaminophen, hydrALAZINE, glucose, dextrose bolus **OR** dextrose bolus, glucagon (rDNA), dextrose, clonazePAM, sodium chloride flush, sodium chloride, ondansetron **OR** ondansetron, polyethylene glycol      Intake/Output Summary (Last 24 hours) at 3/14/2023 1123  Last data filed at 3/14/2023 0826  Gross per 24 hour   Intake 620 ml   Output 780 ml   Net -160 ml         Physical Exam Performed:    /72   Pulse 84   Temp 98.9 °F (37.2 °C) (Oral)   Resp 16   Ht 5' 3\" (1.6 m)   Wt 123 lb (55.8 kg)   SpO2 93%   BMI 21.79 kg/m²     General appearance: No apparent distress, appears stated age and cooperative. HEENT: Pupils equal, round, and reactive to light. Conjunctivae/corneas clear. Neck: Supple, with full range of motion. No jugular venous distention. Trachea midline. Respiratory:  Normal respiratory effort.  Clear to auscultation, bilaterally without Rales/Wheezes/Rhonchi. Cardiovascular: Regular rate and rhythm with normal S1/S2 without murmurs, rubs or gallops. Abdomen: Soft, nontender. Midline incision intact Positive bowel sounds. Musculoskeletal: No clubbing, cyanosis or edema bilaterally. Full range of motion without deformity. Skin: Skin color, texture, turgor normal.  No rashes or lesions. Neurologic:  Neurovascularly intact without any focal sensory/motor deficits. Cranial nerves: II-XII intact, grossly non-focal.  Psychiatric: Alert, mood stable  Capillary Refill: Brisk, 3 seconds, normal   Peripheral Pulses: +2 palpable, equal bilaterally       Labs:   Recent Labs     03/12/23  0515 03/12/23  1732 03/13/23  0514 03/14/23  0721   WBC 4.8  --  5.2 6.4   HGB 6.2* 8.8* 8.4* 8.3*   HCT 19.9* 27.3* 26.0* 26.0*     --  172 194       Recent Labs     03/11/23  1210 03/12/23  0515 03/13/23  0514 03/14/23  0721   NA  --  138 136 132*   K  --  4.5 4.5 5.0   CL  --  111* 110 109   CO2  --  21 21 22   BUN  --  21* 13 15   CREATININE  --  1.2 1.0 1.3*   CALCIUM  --  7.5* 7.7* 7.8*   PHOS 1.5* 2.7 1.5*  --        No results for input(s): AST, ALT, BILIDIR, BILITOT, ALKPHOS in the last 72 hours. No results for input(s): INR in the last 72 hours. No results for input(s): [place]ounds in the last 72 hours. Urinalysis:      Lab Results   Component Value Date/Time    NITRU Negative 03/11/2023 01:23 PM    WBCUA 0-2 03/11/2023 01:23 PM    BACTERIA 2+ 12/10/2022 01:42 PM    RBCUA 0-2 03/11/2023 01:23 PM    BLOODU SMALL 03/11/2023 01:23 PM    SPECGRAV 1.015 03/11/2023 01:23 PM    GLUCOSEU 250 03/11/2023 01:23 PM    GLUCOSEU >=1000 mg/dL 06/07/2010 03:38 PM       Radiology:  XR CHEST (2 VW)   Final Result   Bibasilar pulmonary opacities could represent atelectasis or infection         XR CHEST PORTABLE   Final Result   No evidence of edema or pneumonia. Mild cardiac silhouette enlargement.          CT ABDOMEN PELVIS WO CONTRAST Additional Contrast? None   Final Result   1. No acute abdominal or pelvic findings. 2.  Large amount of formed stool throughout the colon. Correlate with any   evidence of constipation. XR ABDOMEN (KUB) (SINGLE AP VIEW)   Final Result   1. Moderate stool in the colon, correlate with signs of constipation. US RETROPERITONEAL COMPLETE   Final Result   Left kidney is unremarkable         CT HEAD WO CONTRAST   Final Result   No acute intracranial abnormality. Mild cerebral white matter chronic microvascular ischemic disease. Chronic right maxillary sinus opacification in setting of chronic sinusitis. 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 Patient was apparently found unresponsive on the toilet while doing her bowel prep for a colonoscopy procedure scheduled tomorrow with [facility]. She also supposed to get an EGD. Patient reports she remembers getting diaphoretic and weak just prior to using the bathroom. And after she sat on the toilet she became very lightheaded and lost her consciousness. She reports she takes 22 units of Lantus in the morning which she did. Was instructed not to eat anything so she drank some water and Gatorade in the morning.   She also proceeded to take 8 units of the short acting in the morning. Patient reports she is not aware that she is not supposed to take the short acting insulin with a meal if she was not going to eat anything for the meal.  She has been on insulin and a diabetic for about 10 years. EMS was called, and they reported her blood sugar was 40 at the scene. \"    Colon Cancer: New diagnosis. Weight loss, abdominal pain, constipation and elevated CEA. Colonoscopy 2/27, pathology report now finalized and showed invasive Adenocarcinoma. S/p Colectomy 3/7. Pathology suggested localized disease; per Oncology, may not need additional chemotherapy. NGT out. Advance diet as tolerated. Pain control. Anemia 2/2 post op state, colon ca-transfuse PRBC's <7. 1 unit transfused 3/12. Hgb 8.4 today    Hypoglycemia due to insulin use with decreased p.o. intake. Blood sugars were labile pre-op and patient switched to NPH. Now with low BS as diet being advanced slowly. Will resume lower dose Lantus with SSI and monitor. SHARRON with acidosis. 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 Patient was on a bicarb drip. Improved but then worsened again last few days post-op. Continue hydration. Nephrology following. Monitor renal function. Hypertension. Blood pressure labile. PO meds on hold while NPO. Monitor. Somnolent yest, more alert this am, wbc 4.8, h&h 7.1->6.2, LA 1.6, PCT 20.02->13.06, CXR: Bibasilar pulmonary opacities could represent atelectasis or infection, temp 100.5. repeat UA-stable, Current abx:Cefazolin/Flagyl. Hold gabapentin and trazodone. 3/14-> glucose >400, adjust lantus, resume gabapentin at lower dose, anticipate dc tomorrow 3/15    DVT Prophylaxis: Lovenox  Diet: ADULT DIET; Easy to Chew  ADULT ORAL NUTRITION SUPPLEMENT; Breakfast, Lunch, Dinner; Diabetic Oral Supplement  Code Status: Full Code  PT/OT Eval Status: patient ambulatory at home. Therapy re-ordered post-op.      Dispo - anticipate 3/15 to [facility]    SARTHAK Johnson - CNP

## 2023-03-14 NOTE — PROGRESS NOTES
Occupational Therapy    Attempted to see patient for therapy, pt working with PT this AM and request to come back this PM.  Attempted to see patient however in bed with covers over head, eating a snack and requesting no therapy this date after PT this morning. Educated on importance of OOB activities, still declined. Will attempt as schedule and patient allows.     Lalo Fears, OTR/L

## 2023-03-14 NOTE — PROGRESS NOTES
Physical Therapy  Facility/Department: [facility] C3 TELE/MED SURG/ONC  Daily Treatment Note  NAME: Nelson Padgett  : 1960  MRN: [id number]    Date of Service: 3/14/2023    Discharge Recommendations:  [facility]   PT Equipment Recommendations  Equipment Needed: No    Patient Diagnosis(es): The primary encounter diagnosis was Hypoglycemia. Diagnoses of Confusion, Screening for colon cancer, and Malignant neoplasm of hepatic flexure ([address] were also pertinent to this visit. Assessment   Assessment: Tolerance was fair but limited by incisional pain and feeling shaky when up. RN was made aware. Presented with poor trunk control during bed mobility; requiring assistance and sequencing cues to sit EOB safely. Transfers and gait training were mildly unsteady requiring sequencing cues throughout; no LOB noted. Equipment Needed: No     Plan    Physcial Therapy Plan  General Plan: 3-5 times per week     Restrictions  Restrictions/Precautions  Restrictions/Precautions: Fall Risk, General Precautions  Position Activity Restriction  Other position/activity restrictions: telemetry, petersen, IV, closed suction drain at abdomen     Subjective    Subjective  Subjective: Pt agreeable to therapy  Orientation  Overall Orientation Status: Within Functional Limits     Objective   Vitals   /77  SPo2 95% on RA   BPM (post ambulation)  Bed Mobility Training  Supine to Sit: Minimum assistance  Balance  Sitting - Static: Good (unsupported)  Standing - Static: Good (with a RW)  Transfer Training  Sit to Stand: Contact-guard assistance  Stand to Sit: Stand-by assistance    Gait  Overall Level of Assistance: SBA<> CGA  Gait Abnormalities: Shuffling gait; Decreased step clearance; Path deviations  Distance (ft): 20ft + 120 Feet  Assistive Device: Walker, rolling;Gait belt  Comments: cues for walker placement and upright posture.      PT Exercises  APs x20 BLE  GS 15x5\" BLE  LAQ x12 BLE   Sitting EOB for approx 5 mins     Safety Devices  Type of Devices: All fall risk precautions in place;Call light within reach; Chair alarm in place;Gait belt;Nurse notified; Left in chair       Goals  Short Term Goals  Time Frame for Short Term Goals: 7 days (3/7/23) PT re-evaluation completed on 3/11/23. Goals extended to 3/18/23 due to patient's change of medical status and recent surgery on 3/7/23  Short Term Goal 1: Pt will perform bed mobility with supervision-- 3/11 min assist  Short Term Goal 2: Pt will perform transfers with no AD and supervision-- 3/11 min assist  Short Term Goal 3: Pt will ambulate 200ft with no AD and SBA-- 3/11 10 feet x 2 with RW and min assist.  Short Term Goal 4: Pt will negotiate 12 steps with R handrail and SBA-- 3/11 patient declined to attempt  Short Term Goal 5: Pt will perform 12-15 reps BLE exercises by 3/3/23--3/11 patient declined to attempt  Patient Goals   Patient Goals : \"to go home\"    Education  Patient Education  Education Given To: Patient  Education Provided: Role of Therapy;Plan of Care;Transfer Training; Fall Prevention Strategies; Equipment  Education Provided Comments: Disease Specific Education: Patient educated on importance of out of bed mobility, prevention of complications of bedrest, and general safety (importance of using call bell) during hospitalization.   Education Method: Demonstration;Verbal    Therapy Time   Individual Concurrent Group Co-treatment   Time In 1010         Time Out [facility]

## 2023-03-15 VITALS
RESPIRATION RATE: 16 BRPM | WEIGHT: 123 LBS | DIASTOLIC BLOOD PRESSURE: 57 MMHG | HEART RATE: 87 BPM | HEIGHT: 63 IN | SYSTOLIC BLOOD PRESSURE: 120 MMHG | BODY MASS INDEX: 21.79 KG/M2 | TEMPERATURE: 99 F | OXYGEN SATURATION: 92 %

## 2023-03-15 LAB
ANION GAP SERPL CALCULATED.3IONS-SCNC: 5 MMOL/L (ref 3–16)
BASOPHILS # BLD: 0 K/UL (ref 0–0.2)
BASOPHILS NFR BLD: 0.5 %
BUN SERPL-MCNC: 12 MG/DL (ref 7–20)
CALCIUM SERPL-MCNC: 7.9 MG/DL (ref 8.3–10.6)
CHLORIDE SERPL-SCNC: 110 MMOL/L (ref 99–110)
CO2 SERPL-SCNC: 20 MMOL/L (ref 21–32)
CREAT SERPL-MCNC: 1.1 MG/DL (ref 0.6–1.2)
DEPRECATED RDW RBC AUTO: 15.7 % (ref 12.4–15.4)
EOSINOPHIL # BLD: 0.1 K/UL (ref 0–0.6)
EOSINOPHIL NFR BLD: 1.4 %
GFR SERPLBLD CREATININE-BSD FMLA CKD-EPI: 56 ML/MIN/{1.73_M2}
GLUCOSE BLD-MCNC: 254 MG/DL (ref 70–99)
GLUCOSE BLD-MCNC: 318 MG/DL (ref 70–99)
GLUCOSE SERPL-MCNC: 238 MG/DL (ref 70–99)
HCT VFR BLD AUTO: 26.7 % (ref 36–48)
HGB BLD-MCNC: 8.3 G/DL (ref 12–16)
LYMPHOCYTES # BLD: 2.1 K/UL (ref 1–5.1)
LYMPHOCYTES NFR BLD: 22.5 %
MCH RBC QN AUTO: 28.6 PG (ref 26–34)
MCHC RBC AUTO-ENTMCNC: 30.9 G/DL (ref 31–36)
MCV RBC AUTO: 92.5 FL (ref 80–100)
MONOCYTES # BLD: 0.9 K/UL (ref 0–1.3)
MONOCYTES NFR BLD: 9.9 %
NEUTROPHILS # BLD: 6.1 K/UL (ref 1.7–7.7)
NEUTROPHILS NFR BLD: 65.7 %
PERFORMED ON: ABNORMAL
PERFORMED ON: ABNORMAL
PLATELET # BLD AUTO: 221 K/UL (ref 135–450)
PMV BLD AUTO: 9.2 FL (ref 5–10.5)
POTASSIUM SERPL-SCNC: 5 MMOL/L (ref 3.5–5.1)
RBC # BLD AUTO: 2.89 M/UL (ref 4–5.2)
SODIUM SERPL-SCNC: 135 MMOL/L (ref 136–145)
WBC # BLD AUTO: 9.3 K/UL (ref 4–11)

## 2023-03-15 PROCEDURE — 2580000003 HC RX 258: Performed by: SURGERY

## 2023-03-15 PROCEDURE — 6360000002 HC RX W HCPCS: Performed by: INTERNAL MEDICINE

## 2023-03-15 PROCEDURE — 36415 COLL VENOUS BLD VENIPUNCTURE: CPT

## 2023-03-15 PROCEDURE — 85025 COMPLETE CBC W/AUTO DIFF WBC: CPT

## 2023-03-15 PROCEDURE — 6370000000 HC RX 637 (ALT 250 FOR IP): Performed by: INTERNAL MEDICINE

## 2023-03-15 PROCEDURE — 6370000000 HC RX 637 (ALT 250 FOR IP): Performed by: NURSE PRACTITIONER

## 2023-03-15 PROCEDURE — 6370000000 HC RX 637 (ALT 250 FOR IP): Performed by: SURGERY

## 2023-03-15 PROCEDURE — 80048 BASIC METABOLIC PNL TOTAL CA: CPT

## 2023-03-15 RX ORDER — GABAPENTIN 100 MG/1
200 CAPSULE ORAL 3 TIMES DAILY
Qty: 180 CAPSULE | Refills: 0
Start: 2023-03-15 | End: 2023-04-14

## 2023-03-15 RX ORDER — AMLODIPINE BESYLATE 5 MG/1
5 TABLET ORAL DAILY
Qty: 30 TABLET | Refills: 3
Start: 2023-03-16

## 2023-03-15 RX ORDER — OXYCODONE AND ACETAMINOPHEN 7.5; 325 MG/1; MG/1
1 TABLET ORAL EVERY 6 HOURS PRN
Qty: 6 TABLET | Refills: 0 | Status: SHIPPED | OUTPATIENT
Start: 2023-03-15 | End: 2023-03-17

## 2023-03-15 RX ORDER — TRAZODONE HYDROCHLORIDE 50 MG/1
50 TABLET ORAL NIGHTLY
Qty: 30 TABLET | Refills: 0
Start: 2023-03-15 | End: 2023-04-14

## 2023-03-15 RX ORDER — OXYCODONE AND ACETAMINOPHEN 7.5; 325 MG/1; MG/1
1 TABLET ORAL EVERY 6 HOURS PRN
Status: ON HOLD | COMMUNITY
End: 2023-03-15 | Stop reason: SDUPTHER

## 2023-03-15 RX ORDER — TRAZODONE HYDROCHLORIDE 150 MG/1
300 TABLET ORAL NIGHTLY
Status: ON HOLD | COMMUNITY
End: 2023-03-15 | Stop reason: HOSPADM

## 2023-03-15 RX ADMIN — ENOXAPARIN SODIUM 40 MG: 100 INJECTION SUBCUTANEOUS at 09:23

## 2023-03-15 RX ADMIN — ATORVASTATIN CALCIUM 20 MG: 10 TABLET, FILM COATED ORAL at 09:23

## 2023-03-15 RX ADMIN — AMLODIPINE BESYLATE 5 MG: 5 TABLET ORAL at 09:23

## 2023-03-15 RX ADMIN — ASPIRIN 81 MG: 81 TABLET, COATED ORAL at 09:23

## 2023-03-15 RX ADMIN — Medication 1 TABLET: at 09:24

## 2023-03-15 RX ADMIN — GABAPENTIN 200 MG: 100 CAPSULE ORAL at 13:59

## 2023-03-15 RX ADMIN — SODIUM CHLORIDE, PRESERVATIVE FREE 10 ML: 5 INJECTION INTRAVENOUS at 09:26

## 2023-03-15 RX ADMIN — PALIPERIDONE 9 MG: 3 TABLET, EXTENDED RELEASE ORAL at 09:32

## 2023-03-15 RX ADMIN — TRAMADOL HYDROCHLORIDE 50 MG: 50 TABLET, COATED ORAL at 05:45

## 2023-03-15 RX ADMIN — TRAMADOL HYDROCHLORIDE 50 MG: 50 TABLET, COATED ORAL at 13:59

## 2023-03-15 RX ADMIN — GABAPENTIN 200 MG: 100 CAPSULE ORAL at 09:24

## 2023-03-15 RX ADMIN — ACETAMINOPHEN 325MG 650 MG: 325 TABLET ORAL at 13:59

## 2023-03-15 RX ADMIN — INSULIN LISPRO 8 UNITS: 100 INJECTION, SOLUTION INTRAVENOUS; SUBCUTANEOUS at 09:29

## 2023-03-15 RX ADMIN — PANTOPRAZOLE SODIUM 40 MG: 40 TABLET, DELAYED RELEASE ORAL at 09:23

## 2023-03-15 RX ADMIN — INSULIN LISPRO 12 UNITS: 100 INJECTION, SOLUTION INTRAVENOUS; SUBCUTANEOUS at 11:50

## 2023-03-15 RX ADMIN — INSULIN GLARGINE 30 UNITS: 100 INJECTION, SOLUTION SUBCUTANEOUS at 09:29

## 2023-03-15 ASSESSMENT — PAIN DESCRIPTION - LOCATION
LOCATION: ABDOMEN
LOCATION: ABDOMEN

## 2023-03-15 ASSESSMENT — PAIN SCALES - GENERAL
PAINLEVEL_OUTOF10: 7
PAINLEVEL_OUTOF10: 8
PAINLEVEL_OUTOF10: 5

## 2023-03-15 ASSESSMENT — PAIN DESCRIPTION - ORIENTATION: ORIENTATION: MID

## 2023-03-15 NOTE — PROGRESS NOTES
Discharge: Pt discharged to [facility] [place] via transport as per order. IV removed. Scripts plus instructions given. Pt verbalized understanding. Denied questions. All belongings sent with pt.

## 2023-03-15 NOTE — CARE COORDINATION
CASE MANAGEMENT DISCHARGE SUMMARY    Discharge to: [facility] completed: Yes obtained by [facility] Exemption Notification (HENS) completed: PasRR complete for cert    IMM given: (date) NA KATIE    New Durable Medical Equipment ordered/agency: Differ to next level of care    Transportation: Medical Transport explained to pt/family. Pt/family voice no agency preference. Agency used: [employer] up time:1600   Ambulance form completed: Yes    Confirmed discharge plan with: Patient     Facility/Agency, name: [address] [place] [address] faxed  Phone number for report to facility: [phone]    RN, name: Andre Osborn RN    Note: Discharging nurse to complete CHINO, reconcile AVS, and place final copy with patient's discharge packet. RN to ensure that written prescriptions for  Level II medications are sent with patient to the facility as per protocol.   IEBTH Hope

## 2023-03-15 NOTE — PLAN OF CARE
Problem: Discharge Planning  Goal: Discharge to home or other facility with appropriate resources  3/15/2023 1022 by Dixie Bernabe RN  Outcome: Progressing

## 2023-03-15 NOTE — PLAN OF CARE
Problem: Discharge Planning  Goal: Discharge to home or other facility with appropriate resources  Outcome: Progressing     Problem: Pain  Goal: Verbalizes/displays adequate comfort level or baseline comfort level  3/14/2023 2035 by Otto Tineo RN  Outcome: Progressing  Flowsheets (Taken 3/14/2023 2015)  Verbalizes/displays adequate comfort level or baseline comfort level: Encourage patient to monitor pain and request assistance  3/14/2023 0815 by Andreas Cornelius RN  Outcome: Progressing  Flowsheets (Taken 3/14/2023 0815)  Verbalizes/displays adequate comfort level or baseline comfort level:   Encourage patient to monitor pain and request assistance   Assess pain using appropriate pain scale   Administer analgesics based on type and severity of pain and evaluate response   Implement non-pharmacological measures as appropriate and evaluate response     Problem: Chronic Conditions and Co-morbidities  Goal: Patient's chronic conditions and co-morbidity symptoms are monitored and maintained or improved  Outcome: Progressing     Problem: Gastrointestinal - Adult  Goal: Minimal or absence of nausea and vomiting  Outcome: Progressing  Goal: Maintains or returns to baseline bowel function  Outcome: Progressing  Goal: Maintains adequate nutritional intake  Outcome: Progressing     Problem: Metabolic/Fluid and Electrolytes - Adult  Goal: Glucose maintained within prescribed range  Outcome: Progressing  Goal: Electrolytes maintained within normal limits  Outcome: Progressing     Problem: Safety - Adult  Goal: Free from fall injury  3/14/2023 2035 by Otto Tineo RN  Outcome: Progressing  3/14/2023 0815 by Andreas Cornelius RN  Outcome: Progressing  Flowsheets (Taken 3/14/2023 0815)  Free From Fall Injury:   Instruct family/caregiver on patient safety   Based on caregiver fall risk screen, instruct family/caregiver to ask for assistance with transferring infant if caregiver noted to have fall risk factors     Problem: Nutrition Deficit:  Goal: Optimize nutritional status  Outcome: Progressing     Problem: Cardiovascular - Adult  Goal: Maintains optimal cardiac output and hemodynamic stability  Outcome: Progressing  Goal: Absence of cardiac dysrhythmias or at baseline  Outcome: Progressing     Problem: Hematologic - Adult  Goal: Maintains hematologic stability  Outcome: Progressing     Problem: Skin/Tissue Integrity  Goal: Absence of new skin breakdown  Description: 1. Monitor for areas of redness and/or skin breakdown  2. Assess vascular access sites hourly  3. Every 4-6 hours minimum:  Change oxygen saturation probe site  4. Every 4-6 hours:  If on nasal continuous positive airway pressure, respiratory therapy assess nares and determine need for appliance change or resting period.   Outcome: Progressing

## 2023-03-15 NOTE — PROGRESS NOTES
Shift assessment completed and charted. VSS. A&OX4. Dennis drain in place. Midline staples and 4 lap sx incisions. Transfer from C3. Bed locked and in lowest position. Call light within reach. Pt denies any other needs at this time. Will continue to monitor.

## 2023-03-15 NOTE — DISCHARGE INSTR - COC
Continuity of Care Form    Patient Name: Eneida Martinez   :  1960  MRN:  [id number]    Admit date:  2023  Discharge date:  3/15/2023    Code Status Order: Full Code   Advance Directives:   Advance Care Flowsheet Documentation       Date/Time Healthcare Directive Type of Healthcare Directive Copy in [address] Agent's Name Healthcare Agent's Phone Number    [phone] No, patient does not have an advance directive for healthcare treatment -- -- -- -- --            Admitting Physician:  Natalie Mars MD  PCP: Simone Alan    Discharging Nurse: Roman Morris 7010 Unit/Room#: 0113/0113-01  Discharging Unit Phone Number: [phone]    Emergency Contact:   Extended Emergency Contact Information  Primary Emergency Contact: Alonso Norton [address] Phone: [phone]  Mobile Phone: [phone]  Relation: Child  Secondary Emergency Contact: [facility] Phone: [phone]  Mobile Phone: [phone]  Relation: Other    Past Surgical History:  Past Surgical History:   Procedure Laterality Date    BREAST LUMPECTOMY      Bilateral:breast cancer    CARDIAC CATHETERIZATION  2020    Dr. Satish Fontana), DAYANA to Diag 1    COLONOSCOPY N/A 2021    COLONOSCOPY DIAGNOSTIC performed by Mike Santos MD at [address] [facility] N/A 2023    COLONOSCOPY WITH BIOPSY performed by Mike Santos MD at [place] [address]  2/3/2021    CT BONE MARROW BIOPSY 2/3/2021 Fouzia Munoz MD [facility] CT SCAN    HEMICOLECTOMY N/A 3/7/2023    ROBOTIC CONVERTED TO OPEN RIGHT COLECTOMY performed by Debora Dietz MD at [facility] ([facility])      Benign:no cervical cancer per pt'    KIDNEY REMOVAL      right    OTHER SURGICAL HISTORY Right     orif right ankle    TEMPORAL ARTERY BIOPSY Right 2021    RIGHT TEMPORAL ARTERY BIOPSY LIGATION performed by Lin Dickerson MD at [facility]    TUBAL LIGATION      UPPER GASTROINTESTINAL ENDOSCOPY N/A 1/29/2021    EGD BIOPSY performed by Adalberto Mims MD at [address] 12/15/2022    EGD BIOPSY performed by Adalberto Mims MD at [address]       Immunization History:   Immunization History   Administered Date(s) Administered    COVID-19, MODERNA BLUE border, Primary or Immunocompromised, (age 12y+), IM, 100 mcg/0.5mL 05/04/2021, 06/01/2021    Influenza, FLUARIX, FLULAVAL, FLUZONE (age 10 mo+) AND AFLURIA, (age 1 y+), PF, 0.5mL 10/11/2016, 01/23/2020    Influenza, FLUCELVAX, (age 10 mo+), MDCK, PF, 0.5mL 09/21/2017    Tdap (Boostrix, Adacel) 10/07/2020       Active Problems:  Patient Active Problem List   Diagnosis Code    Acute hyperglycemia R73.9    Hypertension, uncontrolled I10    Bilateral malignant neoplasm of breast in female ([facility] C50.911, C50.912    Microalbuminuria R80.9    Obesity (BMI 30-39. 9) E66.9    Slurred speech R47.81    Hx of ischemic CVA I63.40    Brain metastases (HCC) C79.31    Carotid stenosis, bilateral:<50%:per US 7/2016 I65.23    SHARRON (acute kidney injury) (HCC) N17.9    Lactic acidosis E87.20    Frequent falls R29.6    Depression/anxiety F41.8    Abnormal brain MRI R90.89    Acute bilateral low back pain without sciatica M54.50    Closed fracture of right ankle, with routine healing, subsequent encounter S82.891D    Stage 3a chronic kidney disease (HCC) N18.31    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 (HCC) E11.59    Dyslipidemia associated with type 2 diabetes mellitus (HCC) E11.69, E78.5    Bipolar disorder ([facility] F31.9    Cardiomegaly I51.7    Card[facility]omy[facility] I42.9    Carpal tunnel syndrome G56.00    Chronic systolic heart failure (HCC) I50.22    Diffuse cystic mastopathy N60.19    Edema R60.9    Gallstone pancreatitis K85.10    Gout M10.9    History of breast cancer Z85.3    History of renal cell carcinoma Z85.528    Cardiomyopathy in other diseases classified elsewhere I43    Mononeuritis G58.9    Myalgia and myositis IVG2107    Nonspecific abnormal electrocardiogram (ECG) (EKG) R94.31    Patient in clinical research study Z00.6    Peroneal muscular atrophy G60.0    Scoliosis (and kyphoscoliosis), idiopathic M41.20    SOBOE (shortness of breath on exertion) R06.02    Syncope and collapse R55    Chronic pain disorder G89.4    DDD (degenerative disc disease), lumbar M51.36    Diabetic polyneuropathy associated with type 2 diabetes mellitus (HCC) E11.42    Other secondary scoliosis, lumbosacral region M41.57    Thoracic spondylosis without myelopathy M47.814    Morbid obesity due to excess calories ([facility]) E66.01    Age-related nuclear cataract of both eyes H25.13    Hypermetropia, bilateral H52.03    Hypertensive retinopathy, bilateral H35.033    Vitreous degeneration, bilateral H43.813    Acute bilateral low back pain with left-sided sciatica M54.42    History of CVA (cerebrovascular accident) without residual deficits Z86.73    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 stage 3 chronic kidney disease (HCC) I13.0, I50.22, N18.30    S/P mastectomy, right Z90.11    Acute cystitis without hematuria N30.00    Hypomagnesemia E83.42    Chest pain R07.9    CAD S/P percutaneous coronary angioplasty I25.10, Z98.61    Hyperglycemia due to type 2 diabetes mellitus ([facility]) E11.65    Hx of heart artery stent Z95.5    Nuclear senile cataract H25.10    Unintentional weight loss R63.4    Chronic anemia D64.9    Facial abscess L02.01    Asymptomatic bacteriuria R82.71    Acute encephalopathy G93.40    Tobacco use disorder F17.200    Severe malnutrition (HCC) E43    Acute right eye pain H57.11    Suspected condition R69    Bipolar affective disorder, currently depressed, moderate ([facility]) F31.32    Seasonal allergies J30.2    Symptomatic bradycardia R00.1    Dyspnea R06.00    Diffuse pain R52    Hypoglycemia E16.2    Screening for colon cancer Z12.11       Isolation/Infection:   Isolation            No Isolation          Patient Infection Status       Infection Onset Added Last Indicated Last Indicated By Review Planned Expiration Resolved Resolved By    None active    Resolved    COVID-19 (Rule Out) 12/10/22 12/10/22 12/10/22 COVID-19 & Influenza Combo (Ordered)   12/10/22 Rule-Out Test Resulted    COVID-19 21 COVID-19, Rapid   21     COVID-19 (Rule Out) 21 COVID-19, Rapid (Ordered)   21 Rule-Out Test Resulted    C-diff Rule Out 21 Clostridium difficile toxin/antigen (Ordered)   21 Mary Jane Houston RN    COVID-19 (Rule Out) 21 COVID-19, Rapid (Ordered)   21 Rule-Out Test Resulted    COVID-19 (Rule Out) 21 COVID-19, Rapid (Ordered)   21 Rule-Out Test Resulted    COVID-19 (Rule Out) 21 COVID-19 (Ordered)   21 Rule-Out Test Resulted    COVID-19 (Rule Out) 21 COVID-19 (Ordered)   21 Rule-Out Test Resulted    C-diff Rule Out 21 Clostridium difficile toxin/antigen (Ordered)   21 Rule-Out Test Resulted    COVID-19 (Rule Out) 20 COVID-19 (Ordered)   20 Rule-Out Test Resulted    COVID-19 (Rule Out) 20 COVID-19 (Ordered)   20 Maryjane Vann RN            Nurse Assessment:  Last Vital Signs: BP (!) 120/57   Pulse 87   Temp 99 °F (37.2 °C) (Oral)   Resp 16   Ht 5' 3\" (1.6 m)   Wt 123 lb (55.8 kg)   SpO2 92%   BMI 21.79 kg/m²     Last documented pain score (0-10 scale): Pain Level: 8  Last Weight:   Wt Readings from Last 1 Encounters:   23 123 lb (55.8 kg)     Mental Status:  oriented and alert    IV Access:  - None    Nursing Mobility/ADLs:  Walking   Assisted  Transfer  Assisted  Bathing  Assisted  Dressing  Assisted  Toileting  Assisted  Feeding Independent  Med Admin  Independent  Med Delivery   whole    Wound Care Documentation and Therapy:  Incision 03/07/23 Abdomen Lower;Medial (Active)   Dressing Status Clean;Dry; Intact 03/15/23 1018   Incision Cleansed Not Cleansed 03/13/23 1216   Dressing/Treatment Open to air 03/15/23 1018   Closure Staples 03/15/23 1018   Margins Approximated 03/15/23 1018   Incision Assessment Other (Comment) 03/11/23 0850   Drainage Amount None 03/15/23 1018   Odor None 03/14/23 2015   Number of days: 7       Incision 03/07/23 Abdomen Lower;Medial (Active)   Dressing Status Clean;Dry; Intact 03/15/23 1018   Closure Surgical glue 03/15/23 1018   Odor None 03/14/23 2015   Number of days: 7        Elimination:  Continence: Bowel: Yes  Bladder: Yes  Urinary Catheter: None   Colostomy/Ileostomy/Ileal Conduit: No       Date of Last BM: 3/14    Intake/Output Summary (Last 24 hours) at 3/15/2023 1143  Last data filed at 3/15/2023 1018  Gross per 24 hour   Intake 250 ml   Output 430 ml   Net -180 ml     I/O last 3 completed shifts: In: 46 [P.O.:360; I.V.:30]  Out: 200 [Urine:800; Drains:180]    Safety Concerns: At Risk for Falls    Impairments/Disabilities:      None    Nutrition Therapy:  Current Nutrition Therapy:   - Oral Diet:  General    Routes of Feeding: Oral  Liquids: No Restrictions  Daily Fluid Restriction: no  Last Modified Barium Swallow with Video (Video Swallowing Test): not done    Treatments at the Time of Hospital Discharge:   Respiratory Treatments: n/a  Oxygen Therapy:  is not on home oxygen therapy.   Ventilator:    - No ventilator support    Rehab Therapies: Physical Therapy and Occupational Therapy  Weight Bearing Status/Restrictions: No weight bearing restrictions  Other Medical Equipment (for information only, NOT a DME order):  wheelchair  Other Treatments: n/a    Patient's personal belongings (please select all that are sent with patient):  None    RN SIGNATURE:  Electronically signed by Mikayla Way RN on 3/15/23 at 1:29 PM EDT    CASE MANAGEMENT/SOCIAL WORK SECTION    Inpatient Status Date: 02/26/2023    Readmission Risk Assessment Score:  Readmission Risk              Risk of Unplanned Readmission:  42         Discharging to Facility/ [facility]   [facility]     / signature: Electronically signed by IBETH Lorenzo on 3/15/23 at 11:44 AM EDT    PHYSICIAN SECTION    Prognosis: Fair    Condition at Discharge: Stable    Rehab Potential (if transferring to Rehab): Fair    Recommended Labs or Other Treatments After Discharge: PT/OT/SN, Follow up with General Surgery in one week, follow up with PCP. HEATHER drain management per surgery    Physician Certification: I certify the above information and transfer of Eneida Martinez  is necessary for the continuing treatment of the diagnosis listed and that she requires [place] for less 30 days.      Update Admission H&P: No change in H&P    PHYSICIAN SIGNATURE:  Electronically signed by SARTHAK Cisneros CNP on 3/15/23 at 1:16 PM EDT

## 2023-03-17 NOTE — DISCHARGE SUMMARY
Hospital Medicine Discharge Summary    [facility]: [facility]      Patient's PCP: Merlinda Kohler Date: 2/26/2023     Discharge Date: 3/15/2023      Admitting Provider: Jamel Arriaga MD     Discharge Provider: SARTHAK Hester CNP     Discharge Diagnoses: Active Hospital Problems    Diagnosis     Screening for colon cancer [Z12.11]      Priority: Medium    Hypoglycemia [E16.2]      Priority: Medium    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 ([address] [E11.59]     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 stage 3 chronic kidney disease (HCC) [I13.0, I50.22, N18.30]     Hx of ischemic CVA [I63.40]     Bilateral malignant neoplasm of breast in female [facility]) [C50.911, C50.912]     Tobacco use disorder [F17.200]        The patient was seen and examined on day of discharge and this discharge summary is in conjunction with any daily progress note from day of discharge. Hospital Course:     Patient with PMH of DM 2, HTN, CAD s/p PCI, bipolar disorder presented to the ED  for unresponsive state. Patient was apparently found unresponsive on the toilet while doing her bowel prep for a colonoscopy procedure scheduled tomorrow with [facility]. She also supposed to get an EGD. Patient reports she remembers getting diaphoretic and weak just prior to using the bathroom. And after she sat on the toilet she became very lightheaded and lost her consciousness. She reports she takes 22 units of Lantus in the morning which she did. Was instructed not to eat anything so she drank some water and Gatorade in the morning. She also proceeded to take 8 units of the short acting in the morning. Patient reports she is not aware that she is not supposed to take the short acting insulin with a meal if she was not going to eat anything for the meal.  She has been on insulin and a diabetic for about 10 years.   EMS was called, and they reported her blood sugar was 40 at the scene. Colon Cancer: New diagnosis. Weight loss, abdominal pain, constipation and elevated CEA. Colonoscopy 2/27, pathology report now finalized and showed invasive Adenocarcinoma. S/p Colectomy 3/7. Pathology suggested localized disease; per Oncology, may not need additional chemotherapy. NGT out. Advance diet as tolerated. Pain control. Anemia 2/2 post op state, colon ca-transfuse PRBC's <7. 1 unit transfused 3/12. Hgb 8.4 today     Hypoglycemia due to insulin use with decreased p.o. intake. Blood sugars were labile pre-op and patient switched to NPH. Now with low BS as diet being advanced slowly. Will resume lower dose Lantus with SSI and monitor. SHARRON with acidosis. 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 Patient was on a bicarb drip. Improved but then worsened again last few days post-op. Continue hydration. Nephrology following. Monitor renal function. Hypertension. Blood pressure labile. PO meds on hold while NPO. Monitor. Somnolent yest, more alert this am, wbc 4.8, h&h 7.1->6.2, LA 1.6, PCT 20.02->13.06, CXR: Bibasilar pulmonary opacities could represent atelectasis or infection, temp 100.5. repeat UA-stable, Current abx:Cefazolin/Flagyl. Hold gabapentin and trazodone. 3/14-> glucose >400, adjust lantus, resume gabapentin at lower dose, anticipate dc tomorrow 3/15     DC plan: SNF. PT/OT/SN, Follow up with General Surgery in one week, follow up with PCP. HEATHER drain management per surgery    Physical Exam Performed:     BP (!) 120/57   Pulse 87   Temp 99 °F (37.2 °C) (Oral)   Resp 16   Ht 5' 3\" (1.6 m)   Wt 123 lb (55.8 kg)   SpO2 92%   BMI 21.79 kg/m²     General appearance: No apparent distress, appears stated age and cooperative. HEENT: Pupils equal, round, and reactive to light. Conjunctivae/corneas clear. Neck: Supple, with full range of motion. No jugular venous distention. Trachea midline.   Respiratory:  Normal respiratory effort. Clear to auscultation, bilaterally without Rales/Wheezes/Rhonchi. Cardiovascular: Regular rate and rhythm with normal S1/S2 without murmurs, rubs or gallops. Abdomen: Soft, nontender. Midline incision intact Positive bowel sounds. Musculoskeletal: No clubbing, cyanosis or edema bilaterally. Full range of motion without deformity. Skin: Skin color, texture, turgor normal.  No rashes or lesions. Neurologic:  Neurovascularly intact without any focal sensory/motor deficits. Cranial nerves: II-XII intact, grossly non-focal.  Psychiatric: Alert, mood stable  Capillary Refill: Brisk, 3 seconds, normal   Peripheral Pulses: +2 palpable, equal bilaterally       Labs: For convenience and continuity at follow-up the following most recent labs are provided:      CBC:    Lab Results   Component Value Date/Time    WBC 9.3 03/15/2023 05:13 AM    HGB 8.3 03/15/2023 05:13 AM    HCT 26.7 03/15/2023 05:13 AM     03/15/2023 05:13 AM       Renal:    Lab Results   Component Value Date/Time     03/15/2023 05:13 AM    K 5.0 03/15/2023 05:13 AM     03/15/2023 05:13 AM    CO2 20 03/15/2023 05:13 AM    BUN 12 03/15/2023 05:13 AM    CREATININE 1.1 03/15/2023 05:13 AM    CALCIUM 7.9 03/15/2023 05:13 AM    PHOS 1.5 03/13/2023 05:14 AM         Significant Diagnostic Studies    Radiology:   XR CHEST (2 VW)   Final Result   Bibasilar pulmonary opacities could represent atelectasis or infection         XR CHEST PORTABLE   Final Result   No evidence of edema or pneumonia. Mild cardiac silhouette enlargement. CT ABDOMEN PELVIS WO CONTRAST Additional Contrast? None   Final Result   1. No acute abdominal or pelvic findings. 2.  Large amount of formed stool throughout the colon. Correlate with any   evidence of constipation. XR ABDOMEN (KUB) (SINGLE AP VIEW)   Final Result   1. Moderate stool in the colon, correlate with signs of constipation.          US RETROPERITONEAL COMPLETE Final Result   Left kidney is unremarkable         CT HEAD WO CONTRAST   Final Result   No acute intracranial abnormality. Mild cerebral white matter chronic microvascular ischemic disease. Chronic right maxillary sinus opacification in setting of chronic sinusitis. Consults:     IP CONSULT TO GI  IP CONSULT TO GENERAL SURGERY  IP CONSULT TO NEPHROLOGY    Disposition:  [facility]      Condition at Discharge: Stable    Discharge Instructions/Follow-up:  See AVS/CHINO    Code Status:  Prior     Activity: activity as tolerated    Diet: regular diet      Discharge Medications:     Discharge Medication List as of 3/15/2023  1:39 PM             Details   gabapentin (NEURONTIN) 100 MG capsule Take 2 capsules by mouth 3 times daily for 30 days. , Disp-180 capsule, R-0NO PRINT      amLODIPine (NORVASC) 5 MG tablet Take 1 tablet by mouth daily, Disp-30 tablet, R-3NO PRINT                Details   oxyCODONE-acetaminophen (PERCOCET) 7.5-325 MG per tablet Take 1 tablet by mouth every 6 hours as needed for Pain for up to 2 days.  Max Daily Amount: 4 tablets, Disp-6 tablet, R-0Print      traZODone (DESYREL) 50 MG tablet Take 1 tablet by mouth nightly, Disp-30 tablet, R-0NO PRINT                Details   acetaminophen (TYLENOL) 500 MG tablet Take 1 tablet by mouth 4 times daily as needed for Pain, Disp-120 tablet, R-0Normal      insulin glargine (LANTUS) 100 UNIT/ML injection vial Inject 30 Units into the skin every morning, Disp-10 mL, R-3Normal      insulin lispro, 1 Unit Dial, (HUMALOG/ADMELOG) 100 UNIT/ML SOPN With meals                   - at bedtime    < 150 ---   5 units            0 units  151-200 -- 6 units            0 units  201-250 :  7 units            1 units  251-300:   8 units            2 units  301-350:   9 units            3 units  >350 :        10 units          4 units  Upto 30 units/day, Disp-5 Adjustable Dose Pre-filled Pen Syringe, R-2Normal      atorvastatin (LIPITOR) 20 MG tablet TAKE 1 TABLET BY MOUTH EVERY DAY, Disp-30 tablet, R-5Normal      blood glucose test strips (TRUE METRIX BLOOD GLUCOSE TEST) strip As needed. , Disp-200 strip, R-5Normal      Insulin Pen Needle 32G X 4 MM MISC DAILY Starting Fri 7/22/2022, Disp-100 each, R-3, Normal      Blood Glucose Monitoring Suppl (TRUE METRIX METER) w/Device KIT Used to  check blood sugar 3 times eyad, Disp-1 kit, R-1Normal      ticagrelor (BRILINTA) 90 MG TABS tablet TAKE 1 TABLET BY MOUTH TWICE A DAY, Disp-60 tablet, R-11Normal      paliperidone (INVEGA) 9 MG extended release tablet Take 9 mg by mouth every morningHistorical Med      pantoprazole (PROTONIX) 40 MG tablet Take 40 mg by mouth daily Historical Med      ferrous sulfate (IRON 325) 325 (65 Fe) MG tablet Take 325 mg by mouth daily (with breakfast)Historical Med      calcium carbonate-vitamin D (CALTRATE) 600-400 MG-UNIT TABS per tab Take 1 tablet by mouth daily Historical Med      aspirin 81 MG tablet Take 81 mg by mouth dailyHistorical Med             Time Spent on discharge: 35min in the examination, evaluation, counseling and review of medications and discharge plan. Signed:    SARTHAK Hankins CNP   3/17/2023      Thank you Mary Ambriz for the opportunity to be involved in this patient's care. If you have any questions or concerns, please feel free to contact me at [phone].

## 2023-03-27 ENCOUNTER — OFFICE VISIT (OUTPATIENT)
Dept: SURGERY | Age: 63
End: 2023-03-27

## 2023-03-27 VITALS
SYSTOLIC BLOOD PRESSURE: 86 MMHG | DIASTOLIC BLOOD PRESSURE: 49 MMHG | HEART RATE: 108 BPM | BODY MASS INDEX: 20.2 KG/M2 | HEIGHT: 63 IN | WEIGHT: 114 LBS

## 2023-03-27 DIAGNOSIS — Z90.49 S/P RIGHT COLECTOMY: Primary | ICD-10-CM

## 2023-03-27 PROCEDURE — 99024 POSTOP FOLLOW-UP VISIT: CPT | Performed by: SURGERY

## 2023-03-27 NOTE — PROGRESS NOTES
HPI: Nursing notes reviewed. Patient reports bowels working. Some intermittent diarrhea. No fevers or chills. Mild pain. Appetite not great. Not much from drain. ROS:  10 point review of systems performed with pertinent positives in HPI    Phys:    Abd - soft, minimally tender, nondistended, drain serosanguinous   Incision - no erythema or drainage    Assesment: 57 yo s/p right colectomy    Plan: 1. Staples and drain removed   2. Diet and activity as tolerated   3. Follow up in two weeks to see if energy and oral intake are improving.

## 2023-04-06 PROBLEM — Z12.11 SCREENING FOR COLON CANCER: Status: RESOLVED | Noted: 2023-03-07 | Resolved: 2023-04-06

## 2023-04-17 ENCOUNTER — OFFICE VISIT (OUTPATIENT)
Dept: SURGERY | Age: 63
End: 2023-04-17

## 2023-04-17 VITALS
DIASTOLIC BLOOD PRESSURE: 63 MMHG | HEIGHT: 63 IN | HEART RATE: 78 BPM | WEIGHT: 117 LBS | BODY MASS INDEX: 20.73 KG/M2 | SYSTOLIC BLOOD PRESSURE: 111 MMHG

## 2023-04-17 DIAGNOSIS — Z90.49 S/P RIGHT COLECTOMY: Primary | ICD-10-CM

## 2023-04-17 PROCEDURE — 99024 POSTOP FOLLOW-UP VISIT: CPT | Performed by: SURGERY

## 2023-04-17 NOTE — PROGRESS NOTES
HPI: Nursing notes reviewed. Patient reports energy low but improving. Some mild diarrhea. No nausea or emesis. No fevers. ROS:  10 point review of systems performed with pertinent positives in HPI    Phys:    Abd - soft, nontender, nondistended,    Incision - no erythema    Assesment: 59 yo s/p right colectomy    Plan: 1. No dietary restrictions   2. Imodium if needed for diarrhea   3. No activity restrictions   4.   Discussed follow up with Oncology - contact info given

## 2023-05-04 NOTE — TELEPHONE ENCOUNTER
Fax refill request received from [facility] requesting refill for   Atorvastatin 20 mg and True Metrix Test Strips. Mrs. Agustín Acosta has cancelled her last two follow up appointments. Follow up scheduled for 5/10/2023.  [facility] at [facility]

## 2023-05-08 PROBLEM — R26.9 UNSPECIFIED ABNORMALITIES OF GAIT AND MOBILITY: Status: ACTIVE | Noted: 2023-03-15

## 2023-05-08 PROBLEM — R27.8 OTHER LACK OF COORDINATION: Status: ACTIVE | Noted: 2021-02-08

## 2023-05-08 PROBLEM — I25.10 ATHEROSCLEROTIC HEART DISEASE OF NATIVE CORONARY ARTERY WITHOUT ANGINA PECTORIS: Status: ACTIVE | Noted: 2022-12-16

## 2023-05-08 PROBLEM — Z90.49 ACQUIRED ABSENCE OF OTHER SPECIFIED PARTS OF DIGESTIVE TRACT: Status: ACTIVE | Noted: 2023-03-15

## 2023-05-08 PROBLEM — R29.6 REPEATED FALLS: Status: ACTIVE | Noted: 2021-02-05

## 2023-05-08 PROBLEM — M62.81 MUSCLE WEAKNESS (GENERALIZED): Status: ACTIVE | Noted: 2021-02-23

## 2023-05-08 PROBLEM — I13.0 HYPERTENSIVE HEART AND CHRONIC KIDNEY DISEASE WITH HEART FAILURE AND STAGE 1 THROUGH STAGE 4 CHRONIC KIDNEY DISEASE, OR UNSPECIFIED CHRONIC KIDNEY DISEASE (HCC): Status: ACTIVE | Noted: 2021-02-05

## 2023-05-08 PROBLEM — R41.841 COGNITIVE COMMUNICATION DEFICIT: Status: ACTIVE | Noted: 2021-02-08

## 2023-05-08 PROBLEM — I65.23 OCCLUSION AND STENOSIS OF BILATERAL CAROTID ARTERIES: Status: ACTIVE | Noted: 2023-03-15

## 2023-05-10 ENCOUNTER — OFFICE VISIT (OUTPATIENT)
Dept: ENDOCRINOLOGY | Age: 63
End: 2023-05-10
Payer: MEDICAID

## 2023-05-10 VITALS
DIASTOLIC BLOOD PRESSURE: 60 MMHG | HEART RATE: 70 BPM | HEIGHT: 63 IN | OXYGEN SATURATION: 100 % | BODY MASS INDEX: 21.09 KG/M2 | SYSTOLIC BLOOD PRESSURE: 125 MMHG | WEIGHT: 119 LBS

## 2023-05-10 DIAGNOSIS — E11.42 DIABETIC POLYNEUROPATHY ASSOCIATED WITH TYPE 2 DIABETES MELLITUS (HCC): ICD-10-CM

## 2023-05-10 DIAGNOSIS — E11.69 DYSLIPIDEMIA ASSOCIATED WITH TYPE 2 DIABETES MELLITUS (HCC): ICD-10-CM

## 2023-05-10 DIAGNOSIS — E11.29 TYPE 2 DIABETES MELLITUS WITH OTHER DIABETIC KIDNEY COMPLICATION (HCC): ICD-10-CM

## 2023-05-10 DIAGNOSIS — E11.40 DIABETIC NEUROPATHY WITH NEUROLOGIC COMPLICATION (HCC): ICD-10-CM

## 2023-05-10 DIAGNOSIS — Z79.4 TYPE 2 DIABETES MELLITUS WITH HYPERGLYCEMIA, WITH LONG-TERM CURRENT USE OF INSULIN (HCC): ICD-10-CM

## 2023-05-10 DIAGNOSIS — E11.59 TYPE 2 DIABETES MELLITUS WITH VASCULAR DISEASE (HCC): ICD-10-CM

## 2023-05-10 DIAGNOSIS — E11.65 TYPE 2 DIABETES MELLITUS WITH HYPERGLYCEMIA, WITH LONG-TERM CURRENT USE OF INSULIN (HCC): ICD-10-CM

## 2023-05-10 DIAGNOSIS — E11.49 DIABETIC NEUROPATHY WITH NEUROLOGIC COMPLICATION (HCC): Primary | ICD-10-CM

## 2023-05-10 DIAGNOSIS — E11.40 DIABETIC NEUROPATHY WITH NEUROLOGIC COMPLICATION (HCC): Primary | ICD-10-CM

## 2023-05-10 DIAGNOSIS — E78.5 DYSLIPIDEMIA ASSOCIATED WITH TYPE 2 DIABETES MELLITUS (HCC): ICD-10-CM

## 2023-05-10 DIAGNOSIS — E11.49 DIABETIC NEUROPATHY WITH NEUROLOGIC COMPLICATION (HCC): ICD-10-CM

## 2023-05-10 PROCEDURE — G8427 DOCREV CUR MEDS BY ELIG CLIN: HCPCS | Performed by: INTERNAL MEDICINE

## 2023-05-10 PROCEDURE — 3017F COLORECTAL CA SCREEN DOC REV: CPT | Performed by: INTERNAL MEDICINE

## 2023-05-10 PROCEDURE — G8420 CALC BMI NORM PARAMETERS: HCPCS | Performed by: INTERNAL MEDICINE

## 2023-05-10 PROCEDURE — 99214 OFFICE O/P EST MOD 30 MIN: CPT | Performed by: INTERNAL MEDICINE

## 2023-05-10 PROCEDURE — 3074F SYST BP LT 130 MM HG: CPT | Performed by: INTERNAL MEDICINE

## 2023-05-10 PROCEDURE — 1036F TOBACCO NON-USER: CPT | Performed by: INTERNAL MEDICINE

## 2023-05-10 PROCEDURE — 2022F DILAT RTA XM EVC RTNOPTHY: CPT | Performed by: INTERNAL MEDICINE

## 2023-05-10 PROCEDURE — 3078F DIAST BP <80 MM HG: CPT | Performed by: INTERNAL MEDICINE

## 2023-05-10 PROCEDURE — 3052F HG A1C>EQUAL 8.0%<EQUAL 9.0%: CPT | Performed by: INTERNAL MEDICINE

## 2023-05-10 RX ORDER — CLONAZEPAM 0.5 MG/1
TABLET ORAL
COMMUNITY
Start: 2023-05-01

## 2023-05-10 RX ORDER — DEUTETRABENAZINE 9 MG/1
TABLET, COATED ORAL
COMMUNITY
Start: 2023-05-01

## 2023-05-10 RX ORDER — PRAZOSIN HYDROCHLORIDE 2 MG/1
CAPSULE ORAL
COMMUNITY
Start: 2023-05-01

## 2023-05-10 RX ORDER — TRAZODONE HYDROCHLORIDE 100 MG/1
200 TABLET ORAL NIGHTLY
COMMUNITY

## 2023-05-10 RX ORDER — GLIMEPIRIDE 4 MG/1
4 TABLET ORAL
Qty: 90 TABLET | Refills: 1 | Status: SHIPPED | OUTPATIENT
Start: 2023-05-10

## 2023-05-10 NOTE — PROGRESS NOTES
high or low sugars in between the visit: go to ER     RTC in 3 months     EDUCATION:   Greater than 50% of this follow-up visit was spent in general counseling regarding   obesity, diet, exercise, importance of adherence to insulin regime, recognition and treatment of hypo and hyperglycemia,  glucose logging, proper diabetes management, diabetic complications with poor management and the importance of glycemic control in order to avoid the complications of diabetes. Risks and potential complications of diabetes were reviewed with the patient. Diabetes health maintenance plan and follow-up were discussed and understood by the patient. We reviewed the importance of medication compliance and regular follow-up. Aggressive lifestyle modification was encouraged. Exercise Counselling: This patient is a candidate for regular physical exercise. Instructions to perform the following types of exercise:  Swimming or water aerobic exercise  Brisk walking  Playing tennis  Stationary bicycle or elliptical indoor  Low impact aerobic exercise    Instructions given to exercise for the following duration:  30 minutes a day for five-seven days per week.     Following instructions for being active throughout the day in addition to formal exercise:  Walk instead of drive whenever possible  Take the stairs instead of the elevator  Work in the garden  Park to the far end of the parking lot to add more walking steps to destination      Electronically signed by Henrik Lama MD on 5/10/2023 at 11:33 AM

## 2023-05-10 NOTE — PATIENT INSTRUCTIONS
restart Glimepiride 4 mg with breakfast or first meal of the day     Change Lantus to 18 units daily in am.  Victoza 1.8 mg daily in AM    C/w Novolog SS                     With meals (during the day)   - at bedtime    < 150 ---     5 units                                 0 units  151-200 --  6 units                                 0 units  201-250 :    7 units                                 1 units  251-300:     8 units                                 2 units  301-350:     9 units                                 3 units  >350 :         10 units                               4 units

## 2023-05-11 LAB
EST. AVERAGE GLUCOSE BLD GHB EST-MCNC: 154.2 MG/DL
HBA1C MFR BLD: 7 %

## 2023-06-08 ENCOUNTER — TELEPHONE (OUTPATIENT)
Dept: ENDOCRINOLOGY | Age: 63
End: 2023-06-08

## 2023-06-08 NOTE — TELEPHONE ENCOUNTER
Call from pt's daughter Preethi Abrams stating the pt is only has one pen needle available to her while she's visiting her in [place] stated that they are wanting to know if Dr. Castro Ng could call in a 2 day script for Rx pen needles 0.25mm x 5mm to [facility] pharmacy on [address] stated that they are awaiting a full refill from [address] [place] [address] in 2 days     Please advise   CB# [phone]

## 2023-06-22 ENCOUNTER — TELEPHONE (OUTPATIENT)
Dept: ENDOCRINOLOGY | Age: 63
End: 2023-06-22

## 2023-06-22 DIAGNOSIS — E11.65 TYPE 2 DIABETES MELLITUS WITH HYPERGLYCEMIA, WITH LONG-TERM CURRENT USE OF INSULIN (HCC): Primary | ICD-10-CM

## 2023-06-22 DIAGNOSIS — Z79.4 TYPE 2 DIABETES MELLITUS WITH HYPERGLYCEMIA, WITH LONG-TERM CURRENT USE OF INSULIN (HCC): Primary | ICD-10-CM

## 2023-06-22 RX ORDER — INSULIN GLARGINE 100 [IU]/ML
INJECTION, SOLUTION SUBCUTANEOUS
Qty: 15 ML | Refills: 0 | Status: SHIPPED | OUTPATIENT
Start: 2023-06-22

## 2023-06-22 NOTE — TELEPHONE ENCOUNTER
Pt calling and states she needs refill on her Lantus.  Send to [facility] on [address] [facility]# [phone]

## 2023-06-29 ENCOUNTER — TELEPHONE (OUTPATIENT)
Dept: ENDOCRINOLOGY | Age: 63
End: 2023-06-29

## 2023-08-10 ENCOUNTER — OFFICE VISIT (OUTPATIENT)
Dept: ENDOCRINOLOGY | Age: 63
End: 2023-08-10

## 2023-08-10 VITALS
SYSTOLIC BLOOD PRESSURE: 147 MMHG | WEIGHT: 120 LBS | BODY MASS INDEX: 21.26 KG/M2 | HEIGHT: 63 IN | OXYGEN SATURATION: 100 % | HEART RATE: 70 BPM | DIASTOLIC BLOOD PRESSURE: 63 MMHG

## 2023-08-10 DIAGNOSIS — E11.65 TYPE 2 DIABETES MELLITUS WITH HYPERGLYCEMIA, WITH LONG-TERM CURRENT USE OF INSULIN (HCC): Primary | ICD-10-CM

## 2023-08-10 DIAGNOSIS — E11.59 TYPE 2 DIABETES MELLITUS WITH VASCULAR DISEASE (HCC): ICD-10-CM

## 2023-08-10 DIAGNOSIS — Z79.4 TYPE 2 DIABETES MELLITUS WITH HYPERGLYCEMIA, WITH LONG-TERM CURRENT USE OF INSULIN (HCC): ICD-10-CM

## 2023-08-10 DIAGNOSIS — E11.40 DIABETIC NEUROPATHY WITH NEUROLOGIC COMPLICATION (HCC): ICD-10-CM

## 2023-08-10 DIAGNOSIS — E11.42 DIABETIC POLYNEUROPATHY ASSOCIATED WITH TYPE 2 DIABETES MELLITUS (HCC): ICD-10-CM

## 2023-08-10 DIAGNOSIS — E11.49 DIABETIC NEUROPATHY WITH NEUROLOGIC COMPLICATION (HCC): ICD-10-CM

## 2023-08-10 DIAGNOSIS — E11.65 TYPE 2 DIABETES MELLITUS WITH HYPERGLYCEMIA, WITH LONG-TERM CURRENT USE OF INSULIN (HCC): ICD-10-CM

## 2023-08-10 DIAGNOSIS — Z79.4 TYPE 2 DIABETES MELLITUS WITH HYPERGLYCEMIA, WITH LONG-TERM CURRENT USE OF INSULIN (HCC): Primary | ICD-10-CM

## 2023-08-10 DIAGNOSIS — E11.69 DYSLIPIDEMIA ASSOCIATED WITH TYPE 2 DIABETES MELLITUS (HCC): ICD-10-CM

## 2023-08-10 DIAGNOSIS — E11.29 TYPE 2 DIABETES MELLITUS WITH OTHER DIABETIC KIDNEY COMPLICATION (HCC): ICD-10-CM

## 2023-08-10 DIAGNOSIS — I10 ESSENTIAL HYPERTENSION: ICD-10-CM

## 2023-08-10 DIAGNOSIS — E78.5 DYSLIPIDEMIA ASSOCIATED WITH TYPE 2 DIABETES MELLITUS (HCC): ICD-10-CM

## 2023-08-10 LAB — HBA1C MFR BLD: 8.6 %

## 2023-08-10 RX ORDER — INSULIN GLARGINE 100 [IU]/ML
INJECTION, SOLUTION SUBCUTANEOUS
Qty: 15 ML | Refills: 0 | Status: SHIPPED | OUTPATIENT
Start: 2023-08-10

## 2023-08-10 RX ORDER — GLIMEPIRIDE 4 MG/1
4 TABLET ORAL
Qty: 90 TABLET | Refills: 1 | Status: SHIPPED | OUTPATIENT
Start: 2023-08-10

## 2023-08-10 NOTE — PROGRESS NOTES
units            2 units 301-350:   9 units            3 units >350 :       10 units          4 units Upto 30 units/day, Disp: 5 Adjustable Dose Pre-filled Pen Syringe, Rfl: 2    atorvastatin (LIPITOR) 20 MG tablet, TAKE 1 TABLET BY MOUTH EVERY DAY, Disp: 30 tablet, Rfl: 5    blood glucose test strips (TRUE METRIX BLOOD GLUCOSE TEST) strip, As needed. (Patient taking differently: 3 each daily), Disp: 200 strip, Rfl: 5    Insulin Pen Needle 32G X 4 MM MISC, 1 each by Does not apply route daily, Disp: 100 each, Rfl: 3    Blood Glucose Monitoring Suppl (TRUE METRIX METER) w/Device KIT, Used to  check blood sugar 3 times eyad, Disp: 1 kit, Rfl: 1    ticagrelor (BRILINTA) 90 MG TABS tablet, TAKE 1 TABLET BY MOUTH TWICE A DAY, Disp: 60 tablet, Rfl: 11    paliperidone (INVEGA) 9 MG extended release tablet, Take 1 tablet by mouth every morning, Disp: , Rfl:     pantoprazole (PROTONIX) 40 MG tablet, Take 1 tablet by mouth daily, Disp: , Rfl:     ferrous sulfate (IRON 325) 325 (65 Fe) MG tablet, Take 1 tablet by mouth daily (with breakfast), Disp: , Rfl:     calcium carbonate-vitamin D (CALTRATE) 600-400 MG-UNIT TABS per tab, Take 1 tablet by mouth daily, Disp: , Rfl:     aspirin 81 MG tablet, Take 1 tablet by mouth daily, Disp: , Rfl:       Review of Systems:    Constitutional: Negative for fever, chills, and unexpected weight change. HENT: Negative for congestion, ear pain, rhinorrhea,  sore throat and trouble swallowing. Eyes: Negative for photophobia, redness, itching. Respiratory: Negative for cough, shortness of breath and sputum. Cardiovascular: Negative for chest pain, palpitations and leg swelling. Gastrointestinal: Negative for nausea, vomiting, abdominal pain, diarrhea, constipation. Endocrine: Negative for cold intolerance, heat intolerance, polydipsia, polyphagia and polyuria. Genitourinary: Negative for dysuria, urgency, frequency, hematuria and flank pain.    Musculoskeletal: Negative for myalgias,

## 2023-08-10 NOTE — PATIENT INSTRUCTIONS
Restart Glimepiride 4 mg with breakfast or first meal of the day     Change Lantus to 12 units daily in am.  Victoza 1.8 mg daily in AM    C/w Novolog SS                     With meals (during the day)   - at bedtime    < 150 ---     5 units                                 0 units  151-200 --  6 units                                 0 units  201-250 :    7 units                                 1 units  251-300:     8 units                                 2 units  301-350:     9 units                                 3 units  >350 :         10 units                               4 units

## 2023-08-11 RX ORDER — INSULIN GLARGINE 100 [IU]/ML
INJECTION, SOLUTION SUBCUTANEOUS
OUTPATIENT
Start: 2023-08-11

## 2023-08-11 NOTE — TELEPHONE ENCOUNTER
[address] [facility] called requesting Brilinta to be filled.  Sheba Gonzalez stated srj filled rx 04/18/2022

## 2023-08-11 NOTE — TELEPHONE ENCOUNTER
Patient unsure on who she see's, stated Sim Ramos wrote prescription a few years back but has not been seen in office, she followed with [facility] last year and will call them for refill/appt. Medication denied.

## 2023-08-11 NOTE — TELEPHONE ENCOUNTER
[address] [facility] called requesting Brilinta to be filled.  Angela Ring stated srj filled rx 04/18/2022 [facility] [phone]

## 2023-08-11 NOTE — TELEPHONE ENCOUNTER
Requested Refill:   Requested Prescriptions     Pending Prescriptions Disp Refills    insulin glargine (LANTUS SOLOSTAR) 100 UNIT/ML injection pen [Pharmacy Med Name: Oh Gordon 100 UNIT/ML]       Sig: INJECT 18 UNITS DAILY       Refill sent yesterday

## 2023-09-02 ENCOUNTER — HOSPITAL ENCOUNTER (INPATIENT)
Age: 63
LOS: 5 days | Discharge: HOME | End: 2023-09-07
Attending: STUDENT IN AN ORGANIZED HEALTH CARE EDUCATION/TRAINING PROGRAM | Admitting: SURGERY
Payer: MEDICAID

## 2023-09-02 ENCOUNTER — APPOINTMENT (OUTPATIENT)
Dept: CT IMAGING | Age: 63
End: 2023-09-02
Payer: MEDICAID

## 2023-09-02 DIAGNOSIS — N17.0 ACUTE KIDNEY FAILURE WITH TUBULAR NECROSIS (HCC): ICD-10-CM

## 2023-09-02 DIAGNOSIS — K52.9 ENTEROCOLITIS: Primary | ICD-10-CM

## 2023-09-02 LAB
ALBUMIN SERPL-MCNC: 3.6 G/DL (ref 3.4–5)
ALBUMIN/GLOB SERPL: 0.7 {RATIO} (ref 1.1–2.2)
ALP SERPL-CCNC: 313 U/L (ref 40–129)
ALT SERPL-CCNC: 56 U/L (ref 10–40)
ANION GAP SERPL CALCULATED.3IONS-SCNC: 10 MMOL/L (ref 3–16)
AST SERPL-CCNC: 29 U/L (ref 15–37)
BACTERIA URNS QL MICRO: ABNORMAL /HPF
BASOPHILS # BLD: 0.1 K/UL (ref 0–0.2)
BASOPHILS NFR BLD: 0.9 %
BILIRUB SERPL-MCNC: 0.3 MG/DL (ref 0–1)
BILIRUB UR QL STRIP.AUTO: NEGATIVE
BUN SERPL-MCNC: 26 MG/DL (ref 7–20)
CALCIUM SERPL-MCNC: 9.7 MG/DL (ref 8.3–10.6)
CASTS #/AREA URNS LPF: ABNORMAL /LPF
CHLORIDE SERPL-SCNC: 101 MMOL/L (ref 99–110)
CLARITY UR: CLEAR
CO2 SERPL-SCNC: 24 MMOL/L (ref 21–32)
COARSE GRAN CASTS #/AREA URNS LPF: ABNORMAL /LPF (ref 0–2)
COLOR UR: YELLOW
CREAT SERPL-MCNC: 1.9 MG/DL (ref 0.6–1.2)
DEPRECATED RDW RBC AUTO: 13.9 % (ref 12.4–15.4)
EOSINOPHIL # BLD: 0 K/UL (ref 0–0.6)
EOSINOPHIL NFR BLD: 0.5 %
EPI CELLS #/AREA URNS HPF: ABNORMAL /HPF (ref 0–5)
FINE GRAN CASTS #/AREA URNS HPF: ABNORMAL /LPF (ref 0–2)
GFR SERPLBLD CREATININE-BSD FMLA CKD-EPI: 29 ML/MIN/{1.73_M2}
GLUCOSE BLD-MCNC: 155 MG/DL (ref 70–99)
GLUCOSE SERPL-MCNC: 263 MG/DL (ref 70–99)
GLUCOSE UR STRIP.AUTO-MCNC: 500 MG/DL
HCT VFR BLD AUTO: 35.3 % (ref 36–48)
HGB BLD-MCNC: 11.3 G/DL (ref 12–16)
HGB UR QL STRIP.AUTO: ABNORMAL
HYALINE CASTS #/AREA URNS LPF: ABNORMAL /LPF (ref 0–2)
KETONES UR STRIP.AUTO-MCNC: ABNORMAL MG/DL
LEUKOCYTE ESTERASE UR QL STRIP.AUTO: NEGATIVE
LIPASE SERPL-CCNC: 6 U/L (ref 13–60)
LYMPHOCYTES # BLD: 1.6 K/UL (ref 1–5.1)
LYMPHOCYTES NFR BLD: 19.7 %
MCH RBC QN AUTO: 27.7 PG (ref 26–34)
MCHC RBC AUTO-ENTMCNC: 32.1 G/DL (ref 31–36)
MCV RBC AUTO: 86.4 FL (ref 80–100)
MONOCYTES # BLD: 0.5 K/UL (ref 0–1.3)
MONOCYTES NFR BLD: 6.1 %
NEUTROPHILS # BLD: 6 K/UL (ref 1.7–7.7)
NEUTROPHILS NFR BLD: 72.8 %
NITRITE UR QL STRIP.AUTO: NEGATIVE
PERFORMED ON: ABNORMAL
PH UR STRIP.AUTO: 5 [PH] (ref 5–8)
PLATELET # BLD AUTO: 183 K/UL (ref 135–450)
PMV BLD AUTO: 9.6 FL (ref 5–10.5)
POTASSIUM SERPL-SCNC: 4.5 MMOL/L (ref 3.5–5.1)
PROT SERPL-MCNC: 8.8 G/DL (ref 6.4–8.2)
PROT UR STRIP.AUTO-MCNC: >=300 MG/DL
RBC # BLD AUTO: 4.09 M/UL (ref 4–5.2)
RBC #/AREA URNS HPF: ABNORMAL /HPF (ref 0–4)
SODIUM SERPL-SCNC: 135 MMOL/L (ref 136–145)
SP GR UR STRIP.AUTO: >=1.03 (ref 1–1.03)
UA DIPSTICK W REFLEX MICRO PNL UR: YES
URN SPEC COLLECT METH UR: ABNORMAL
UROBILINOGEN UR STRIP-ACNC: 0.2 E.U./DL
WBC # BLD AUTO: 8.3 K/UL (ref 4–11)
WBC #/AREA URNS HPF: ABNORMAL /HPF (ref 0–5)

## 2023-09-02 PROCEDURE — 6370000000 HC RX 637 (ALT 250 FOR IP): Performed by: NURSE PRACTITIONER

## 2023-09-02 PROCEDURE — 2580000003 HC RX 258: Performed by: NURSE PRACTITIONER

## 2023-09-02 PROCEDURE — 96374 THER/PROPH/DIAG INJ IV PUSH: CPT

## 2023-09-02 PROCEDURE — 6360000002 HC RX W HCPCS: Performed by: NURSE PRACTITIONER

## 2023-09-02 PROCEDURE — 1200000000 HC SEMI PRIVATE

## 2023-09-02 PROCEDURE — 74176 CT ABD & PELVIS W/O CONTRAST: CPT

## 2023-09-02 PROCEDURE — 99285 EMERGENCY DEPT VISIT HI MDM: CPT

## 2023-09-02 PROCEDURE — 81001 URINALYSIS AUTO W/SCOPE: CPT

## 2023-09-02 PROCEDURE — 80053 COMPREHEN METABOLIC PANEL: CPT

## 2023-09-02 PROCEDURE — 83690 ASSAY OF LIPASE: CPT

## 2023-09-02 PROCEDURE — 85025 COMPLETE CBC W/AUTO DIFF WBC: CPT

## 2023-09-02 PROCEDURE — 96361 HYDRATE IV INFUSION ADD-ON: CPT

## 2023-09-02 RX ORDER — OXYCODONE AND ACETAMINOPHEN 7.5; 325 MG/1; MG/1
1 TABLET ORAL EVERY 4 HOURS PRN
COMMUNITY

## 2023-09-02 RX ORDER — TRAZODONE HYDROCHLORIDE 50 MG/1
200 TABLET ORAL NIGHTLY
Status: DISCONTINUED | OUTPATIENT
Start: 2023-09-02 | End: 2023-09-07 | Stop reason: HOSPADM

## 2023-09-02 RX ORDER — SODIUM CHLORIDE 9 MG/ML
INJECTION, SOLUTION INTRAVENOUS PRN
Status: DISCONTINUED | OUTPATIENT
Start: 2023-09-02 | End: 2023-09-07 | Stop reason: HOSPADM

## 2023-09-02 RX ORDER — AMLODIPINE BESYLATE 5 MG/1
5 TABLET ORAL DAILY
Status: DISCONTINUED | OUTPATIENT
Start: 2023-09-03 | End: 2023-09-03

## 2023-09-02 RX ORDER — ONDANSETRON 2 MG/ML
4 INJECTION INTRAMUSCULAR; INTRAVENOUS EVERY 30 MIN PRN
Status: DISCONTINUED | OUTPATIENT
Start: 2023-09-02 | End: 2023-09-02

## 2023-09-02 RX ORDER — ACETAMINOPHEN 650 MG/1
650 SUPPOSITORY RECTAL EVERY 6 HOURS PRN
Status: DISCONTINUED | OUTPATIENT
Start: 2023-09-02 | End: 2023-09-07 | Stop reason: HOSPADM

## 2023-09-02 RX ORDER — PRAZOSIN HYDROCHLORIDE 1 MG/1
2 CAPSULE ORAL NIGHTLY
Status: DISCONTINUED | OUTPATIENT
Start: 2023-09-02 | End: 2023-09-07 | Stop reason: HOSPADM

## 2023-09-02 RX ORDER — ENOXAPARIN SODIUM 100 MG/ML
30 INJECTION SUBCUTANEOUS DAILY
Status: DISCONTINUED | OUTPATIENT
Start: 2023-09-03 | End: 2023-09-05

## 2023-09-02 RX ORDER — ONDANSETRON 4 MG/1
4 TABLET, ORALLY DISINTEGRATING ORAL EVERY 8 HOURS PRN
Status: DISCONTINUED | OUTPATIENT
Start: 2023-09-02 | End: 2023-09-07 | Stop reason: HOSPADM

## 2023-09-02 RX ORDER — GABAPENTIN 600 MG/1
600 TABLET ORAL 3 TIMES DAILY
Status: ON HOLD | COMMUNITY
End: 2023-09-07 | Stop reason: HOSPADM

## 2023-09-02 RX ORDER — ONDANSETRON 2 MG/ML
4 INJECTION INTRAMUSCULAR; INTRAVENOUS EVERY 6 HOURS PRN
Status: DISCONTINUED | OUTPATIENT
Start: 2023-09-02 | End: 2023-09-07 | Stop reason: HOSPADM

## 2023-09-02 RX ORDER — INSULIN GLARGINE 100 [IU]/ML
12 INJECTION, SOLUTION SUBCUTANEOUS NIGHTLY
Status: DISCONTINUED | OUTPATIENT
Start: 2023-09-02 | End: 2023-09-07 | Stop reason: HOSPADM

## 2023-09-02 RX ORDER — INSULIN LISPRO 100 [IU]/ML
0-4 INJECTION, SOLUTION INTRAVENOUS; SUBCUTANEOUS NIGHTLY
Status: DISCONTINUED | OUTPATIENT
Start: 2023-09-02 | End: 2023-09-07 | Stop reason: HOSPADM

## 2023-09-02 RX ORDER — FENTANYL CITRATE 50 UG/ML
50 INJECTION, SOLUTION INTRAMUSCULAR; INTRAVENOUS ONCE
Status: COMPLETED | OUTPATIENT
Start: 2023-09-02 | End: 2023-09-02

## 2023-09-02 RX ORDER — SODIUM CHLORIDE 9 MG/ML
INJECTION, SOLUTION INTRAVENOUS CONTINUOUS
Status: DISCONTINUED | OUTPATIENT
Start: 2023-09-02 | End: 2023-09-05

## 2023-09-02 RX ORDER — ACETAMINOPHEN 325 MG/1
650 TABLET ORAL EVERY 6 HOURS PRN
Status: DISCONTINUED | OUTPATIENT
Start: 2023-09-02 | End: 2023-09-07 | Stop reason: HOSPADM

## 2023-09-02 RX ORDER — PALIPERIDONE 3 MG/1
9 TABLET, EXTENDED RELEASE ORAL EVERY MORNING
Status: DISCONTINUED | OUTPATIENT
Start: 2023-09-03 | End: 2023-09-07 | Stop reason: HOSPADM

## 2023-09-02 RX ORDER — SODIUM CHLORIDE 0.9 % (FLUSH) 0.9 %
5-40 SYRINGE (ML) INJECTION PRN
Status: DISCONTINUED | OUTPATIENT
Start: 2023-09-02 | End: 2023-09-07 | Stop reason: HOSPADM

## 2023-09-02 RX ORDER — DEXTROSE MONOHYDRATE 100 MG/ML
INJECTION, SOLUTION INTRAVENOUS CONTINUOUS PRN
Status: DISCONTINUED | OUTPATIENT
Start: 2023-09-02 | End: 2023-09-07 | Stop reason: HOSPADM

## 2023-09-02 RX ORDER — 0.9 % SODIUM CHLORIDE 0.9 %
1000 INTRAVENOUS SOLUTION INTRAVENOUS ONCE
Status: COMPLETED | OUTPATIENT
Start: 2023-09-02 | End: 2023-09-02

## 2023-09-02 RX ORDER — ASPIRIN 81 MG/1
81 TABLET ORAL DAILY
Status: DISCONTINUED | OUTPATIENT
Start: 2023-09-03 | End: 2023-09-07 | Stop reason: HOSPADM

## 2023-09-02 RX ORDER — SODIUM CHLORIDE 0.9 % (FLUSH) 0.9 %
5-40 SYRINGE (ML) INJECTION EVERY 12 HOURS SCHEDULED
Status: DISCONTINUED | OUTPATIENT
Start: 2023-09-02 | End: 2023-09-07 | Stop reason: HOSPADM

## 2023-09-02 RX ORDER — PANTOPRAZOLE SODIUM 40 MG/1
40 TABLET, DELAYED RELEASE ORAL DAILY
Status: DISCONTINUED | OUTPATIENT
Start: 2023-09-03 | End: 2023-09-07 | Stop reason: HOSPADM

## 2023-09-02 RX ORDER — ATORVASTATIN CALCIUM 10 MG/1
20 TABLET, FILM COATED ORAL DAILY
Status: DISCONTINUED | OUTPATIENT
Start: 2023-09-03 | End: 2023-09-07 | Stop reason: HOSPADM

## 2023-09-02 RX ORDER — INSULIN LISPRO 100 [IU]/ML
0-8 INJECTION, SOLUTION INTRAVENOUS; SUBCUTANEOUS
Status: DISCONTINUED | OUTPATIENT
Start: 2023-09-03 | End: 2023-09-07 | Stop reason: HOSPADM

## 2023-09-02 RX ADMIN — INSULIN GLARGINE 12 UNITS: 100 INJECTION, SOLUTION SUBCUTANEOUS at 23:59

## 2023-09-02 RX ADMIN — FENTANYL CITRATE 50 MCG: 50 INJECTION INTRAMUSCULAR; INTRAVENOUS at 20:38

## 2023-09-02 RX ADMIN — SODIUM CHLORIDE 1000 ML: 9 INJECTION, SOLUTION INTRAVENOUS at 20:39

## 2023-09-02 RX ADMIN — SODIUM CHLORIDE: 9 INJECTION, SOLUTION INTRAVENOUS at 23:13

## 2023-09-02 RX ADMIN — TICAGRELOR 90 MG: 90 TABLET ORAL at 23:59

## 2023-09-02 ASSESSMENT — PAIN SCALES - GENERAL
PAINLEVEL_OUTOF10: 5
PAINLEVEL_OUTOF10: 5
PAINLEVEL_OUTOF10: 9
PAINLEVEL_OUTOF10: 9

## 2023-09-02 ASSESSMENT — PAIN - FUNCTIONAL ASSESSMENT: PAIN_FUNCTIONAL_ASSESSMENT: 0-10

## 2023-09-02 ASSESSMENT — PAIN DESCRIPTION - LOCATION: LOCATION: ABDOMEN

## 2023-09-02 NOTE — ED PROVIDER NOTES
[address] [place] [address]  ED  EMERGENCY DEPARTMENT ENCOUNTER        Pt Name: Zeina Dillard  MRN: [id number]  [facility] 1960  Date of evaluation: 9/2/2023  Provider: SARTHAK Partida - PIETER  PCP: Summer Nowak  Note Started: 7:56 PM EDT 9/2/23     I have seen and evaluated this patient with my supervising physician Ozzie Herrera MD.      [address]       Chief Complaint   Patient presents with    Abdominal Pain     Pain after eating at the [address]: 1 or more Elements     History From: Patient  Limitations to history : None    Zeina Dillard is a 58 y.o. female who presents to ER with abdominal pain. Abdominal pain that started after eating at [facility], pain, vomiting and diarrhea started at the same time. Pain is in the middle of the abdomen and to the left side. Reports sweating, no fevers, chills. She was by herself. Reports prior abdominal surgeries, only has one kidney. Nursing Notes were all reviewed and agreed with or any disagreements were addressed in the HPI. REVIEW OF SYSTEMS :      Review of Systems    Positives and Pertinent negatives as per HPI. MEDICAL HISTORY     Past Medical History:   Diagnosis Date    Abnormal brain MRI 7/20/2017    Partially empty sella and minimal chronic small vessel ischemic disease    Acute bilateral low back pain without sciatica 11/2/2016    SHARRON (acute kidney injury) ([address]) 7/5/2017    Arthritis     back    Bipolar disorder ([address]) 10/18/2008    CAD (coronary artery disease)     stent placed 6/8/20    Cancer ([address]) 2015    bilateral breast:s/p lumpectomy/radiation:under care care of breast specialist:Dr. Boone     Carotid stenosis, bilateral:<50%:per US 7/2016 7/15/2016    Carpal tunnel syndrome 10/18/2008    Cervical cancer screening 2014    Nml per pt'.     Coronary artery disease of native artery of native heart with stable angina pectoris ([address]) 6/9/2020    DDD (degenerative disc disease), lumbar evaluation and management of her acute kidney injury. I feel that the enterocolitis is likely due to food poisoning based on patient's history. She was given Zofran here in the ED, there have been no reports of further episodes of vomiting or diarrhea. Disposition Considerations (tests considered but not done, Admit vs D/C, Shared Decision Making, Pt Expectation of Test or Tx.): Lia Nowak will be admitted for further observation and evaluation of Agustina Fried's SHARRON. As I have deemed necessary from their history, physical and studies, I have considered and evaluated Lia Nowak for the following diagnoses:  ANEMIA, ELECTROLYTE ABNORMALITY, DEHYDRATION, UTI, INFECTION, LIVER DYSFUNCTION, APPENDICITIS, CHOLECYSTITIS, PANCREATITIS, BOWEL OBSTRUCTION/PERFORATION, DIVERTICULITIS. I am the Primary Clinician of Record. FINAL IMPRESSION      1. Enterocolitis    2.  Acute kidney failure with tubular necrosis [facility])          DISPOSITION/PLAN     DISPOSITION Decision To Admit 09/02/2023 09:45:05 PM    (Please note that portions of this note were completed with a voice recognition program.  Efforts were made to edit the dictations but occasionally words are mis-transcribed.)    SARTHAK Haley CNP (electronically signed)        SARTHAK Haley CNP  09/02/23 2162

## 2023-09-03 PROBLEM — R19.7 ACUTE DIARRHEA: Status: ACTIVE | Noted: 2023-09-03

## 2023-09-03 LAB
ALBUMIN SERPL-MCNC: 2.5 G/DL (ref 3.4–5)
ALBUMIN/GLOB SERPL: 0.7 {RATIO} (ref 1.1–2.2)
ALP SERPL-CCNC: 201 U/L (ref 40–129)
ALT SERPL-CCNC: 32 U/L (ref 10–40)
ANION GAP SERPL CALCULATED.3IONS-SCNC: 5 MMOL/L (ref 3–16)
AST SERPL-CCNC: 16 U/L (ref 15–37)
BASOPHILS # BLD: 0 K/UL (ref 0–0.2)
BASOPHILS NFR BLD: 0.5 %
BILIRUB SERPL-MCNC: <0.2 MG/DL (ref 0–1)
BUN SERPL-MCNC: 29 MG/DL (ref 7–20)
C DIFF TOX A+B STL QL IA: ABNORMAL
CALCIUM SERPL-MCNC: 8.2 MG/DL (ref 8.3–10.6)
CHLORIDE SERPL-SCNC: 111 MMOL/L (ref 99–110)
CO2 SERPL-SCNC: 24 MMOL/L (ref 21–32)
CREAT SERPL-MCNC: 1.6 MG/DL (ref 0.6–1.2)
DEPRECATED RDW RBC AUTO: 13.7 % (ref 12.4–15.4)
EOSINOPHIL # BLD: 0.1 K/UL (ref 0–0.6)
EOSINOPHIL NFR BLD: 2.1 %
GFR SERPLBLD CREATININE-BSD FMLA CKD-EPI: 36 ML/MIN/{1.73_M2}
GLUCOSE BLD-MCNC: 168 MG/DL (ref 70–99)
GLUCOSE BLD-MCNC: 187 MG/DL (ref 70–99)
GLUCOSE BLD-MCNC: 224 MG/DL (ref 70–99)
GLUCOSE BLD-MCNC: 228 MG/DL (ref 70–99)
GLUCOSE SERPL-MCNC: 175 MG/DL (ref 70–99)
HCT VFR BLD AUTO: 27.2 % (ref 36–48)
HGB BLD-MCNC: 8.8 G/DL (ref 12–16)
LACTATE BLDV-SCNC: 0.9 MMOL/L (ref 0.4–2)
LYMPHOCYTES # BLD: 1.2 K/UL (ref 1–5.1)
LYMPHOCYTES NFR BLD: 27.5 %
MAGNESIUM SERPL-MCNC: 2 MG/DL (ref 1.8–2.4)
MCH RBC QN AUTO: 27.9 PG (ref 26–34)
MCHC RBC AUTO-ENTMCNC: 32.2 G/DL (ref 31–36)
MCV RBC AUTO: 86.6 FL (ref 80–100)
MONOCYTES # BLD: 0.4 K/UL (ref 0–1.3)
MONOCYTES NFR BLD: 9.6 %
NEUTROPHILS # BLD: 2.6 K/UL (ref 1.7–7.7)
NEUTROPHILS NFR BLD: 60.3 %
PERFORMED ON: ABNORMAL
PHOSPHATE SERPL-MCNC: 4.1 MG/DL (ref 2.5–4.9)
PLATELET # BLD AUTO: 145 K/UL (ref 135–450)
PMV BLD AUTO: 10 FL (ref 5–10.5)
POTASSIUM SERPL-SCNC: 4.6 MMOL/L (ref 3.5–5.1)
PROT SERPL-MCNC: 6.1 G/DL (ref 6.4–8.2)
RBC # BLD AUTO: 3.14 M/UL (ref 4–5.2)
REASON FOR REJECTION: NORMAL
REJECTED TEST: NORMAL
SODIUM SERPL-SCNC: 140 MMOL/L (ref 136–145)
WBC # BLD AUTO: 4.3 K/UL (ref 4–11)

## 2023-09-03 PROCEDURE — 36415 COLL VENOUS BLD VENIPUNCTURE: CPT

## 2023-09-03 PROCEDURE — 1200000000 HC SEMI PRIVATE

## 2023-09-03 PROCEDURE — 6370000000 HC RX 637 (ALT 250 FOR IP): Performed by: INTERNAL MEDICINE

## 2023-09-03 PROCEDURE — 87506 IADNA-DNA/RNA PROBE TQ 6-11: CPT

## 2023-09-03 PROCEDURE — 87324 CLOSTRIDIUM AG IA: CPT

## 2023-09-03 PROCEDURE — 6370000000 HC RX 637 (ALT 250 FOR IP): Performed by: HOSPITALIST

## 2023-09-03 PROCEDURE — 6370000000 HC RX 637 (ALT 250 FOR IP): Performed by: NURSE PRACTITIONER

## 2023-09-03 PROCEDURE — 83735 ASSAY OF MAGNESIUM: CPT

## 2023-09-03 PROCEDURE — 84100 ASSAY OF PHOSPHORUS: CPT

## 2023-09-03 PROCEDURE — 83605 ASSAY OF LACTIC ACID: CPT

## 2023-09-03 PROCEDURE — 80053 COMPREHEN METABOLIC PANEL: CPT

## 2023-09-03 PROCEDURE — 87449 NOS EACH ORGANISM AG IA: CPT

## 2023-09-03 PROCEDURE — 85025 COMPLETE CBC W/AUTO DIFF WBC: CPT

## 2023-09-03 PROCEDURE — 87425 ROTAVIRUS AG IA: CPT

## 2023-09-03 PROCEDURE — 6360000002 HC RX W HCPCS: Performed by: NURSE PRACTITIONER

## 2023-09-03 PROCEDURE — 2580000003 HC RX 258: Performed by: NURSE PRACTITIONER

## 2023-09-03 RX ORDER — DICYCLOMINE HCL 20 MG
20 TABLET ORAL ONCE
Status: COMPLETED | OUTPATIENT
Start: 2023-09-03 | End: 2023-09-03

## 2023-09-03 RX ORDER — LOSARTAN POTASSIUM 25 MG/1
50 TABLET ORAL DAILY
Status: DISCONTINUED | OUTPATIENT
Start: 2023-09-03 | End: 2023-09-07 | Stop reason: HOSPADM

## 2023-09-03 RX ORDER — GABAPENTIN 100 MG/1
200 CAPSULE ORAL 3 TIMES DAILY
Status: DISCONTINUED | OUTPATIENT
Start: 2023-09-03 | End: 2023-09-07 | Stop reason: HOSPADM

## 2023-09-03 RX ORDER — VANCOMYCIN HYDROCHLORIDE 50 MG/ML
125 KIT ORAL EVERY 6 HOURS SCHEDULED
Status: DISCONTINUED | OUTPATIENT
Start: 2023-09-03 | End: 2023-09-07 | Stop reason: HOSPADM

## 2023-09-03 RX ORDER — METRONIDAZOLE 500 MG/100ML
500 INJECTION, SOLUTION INTRAVENOUS EVERY 8 HOURS
Status: DISCONTINUED | OUTPATIENT
Start: 2023-09-03 | End: 2023-09-03

## 2023-09-03 RX ADMIN — SODIUM CHLORIDE: 9 INJECTION, SOLUTION INTRAVENOUS at 21:02

## 2023-09-03 RX ADMIN — TICAGRELOR 90 MG: 90 TABLET ORAL at 09:23

## 2023-09-03 RX ADMIN — SODIUM CHLORIDE: 9 INJECTION, SOLUTION INTRAVENOUS at 03:46

## 2023-09-03 RX ADMIN — Medication 125 MG: at 17:52

## 2023-09-03 RX ADMIN — TICAGRELOR 90 MG: 90 TABLET ORAL at 20:52

## 2023-09-03 RX ADMIN — SODIUM CHLORIDE, PRESERVATIVE FREE 10 ML: 5 INJECTION INTRAVENOUS at 20:52

## 2023-09-03 RX ADMIN — INSULIN LISPRO 2 UNITS: 100 INJECTION, SOLUTION INTRAVENOUS; SUBCUTANEOUS at 17:52

## 2023-09-03 RX ADMIN — GABAPENTIN 200 MG: 100 CAPSULE ORAL at 15:23

## 2023-09-03 RX ADMIN — TRAZODONE HYDROCHLORIDE 200 MG: 50 TABLET ORAL at 00:00

## 2023-09-03 RX ADMIN — INSULIN GLARGINE 12 UNITS: 100 INJECTION, SOLUTION SUBCUTANEOUS at 20:52

## 2023-09-03 RX ADMIN — Medication 125 MG: at 13:14

## 2023-09-03 RX ADMIN — DICYCLOMINE HYDROCHLORIDE 20 MG: 20 TABLET ORAL at 20:51

## 2023-09-03 RX ADMIN — PANTOPRAZOLE SODIUM 40 MG: 40 TABLET, DELAYED RELEASE ORAL at 09:23

## 2023-09-03 RX ADMIN — ACETAMINOPHEN 650 MG: 325 TABLET ORAL at 15:23

## 2023-09-03 RX ADMIN — INSULIN LISPRO 2 UNITS: 100 INJECTION, SOLUTION INTRAVENOUS; SUBCUTANEOUS at 13:14

## 2023-09-03 RX ADMIN — ATORVASTATIN CALCIUM 20 MG: 10 TABLET, FILM COATED ORAL at 09:23

## 2023-09-03 RX ADMIN — GABAPENTIN 200 MG: 100 CAPSULE ORAL at 09:23

## 2023-09-03 RX ADMIN — PRAZOSIN HYDROCHLORIDE 2 MG: 1 CAPSULE ORAL at 20:51

## 2023-09-03 RX ADMIN — GABAPENTIN 200 MG: 100 CAPSULE ORAL at 00:55

## 2023-09-03 RX ADMIN — SODIUM CHLORIDE: 9 INJECTION, SOLUTION INTRAVENOUS at 13:14

## 2023-09-03 RX ADMIN — PRAZOSIN HYDROCHLORIDE 2 MG: 1 CAPSULE ORAL at 00:00

## 2023-09-03 RX ADMIN — TRAZODONE HYDROCHLORIDE 200 MG: 50 TABLET ORAL at 20:51

## 2023-09-03 RX ADMIN — ASPIRIN 81 MG: 81 TABLET, COATED ORAL at 09:23

## 2023-09-03 RX ADMIN — GABAPENTIN 200 MG: 100 CAPSULE ORAL at 20:51

## 2023-09-03 RX ADMIN — LOSARTAN POTASSIUM 50 MG: 25 TABLET, FILM COATED ORAL at 09:22

## 2023-09-03 RX ADMIN — PALIPERIDONE 9 MG: 3 TABLET, EXTENDED RELEASE ORAL at 10:42

## 2023-09-03 RX ADMIN — ENOXAPARIN SODIUM 30 MG: 100 INJECTION SUBCUTANEOUS at 09:23

## 2023-09-03 ASSESSMENT — PAIN DESCRIPTION - LOCATION
LOCATION: ABDOMEN

## 2023-09-03 ASSESSMENT — PAIN DESCRIPTION - FREQUENCY: FREQUENCY: CONTINUOUS

## 2023-09-03 ASSESSMENT — PAIN DESCRIPTION - DESCRIPTORS
DESCRIPTORS: CRAMPING
DESCRIPTORS: ACHING

## 2023-09-03 ASSESSMENT — PAIN SCALES - GENERAL
PAINLEVEL_OUTOF10: 7

## 2023-09-03 ASSESSMENT — PAIN DESCRIPTION - ORIENTATION
ORIENTATION: LOWER;MID
ORIENTATION: MID;LOWER
ORIENTATION: LOWER

## 2023-09-03 ASSESSMENT — PAIN DESCRIPTION - ONSET: ONSET: ON-GOING

## 2023-09-03 NOTE — PROGRESS NOTES
4 Eyes Skin Assessment     NAME:  Refugio Mullins  YOB: 1960  MEDICAL RECORD NUMBER:  [id number]    The patient is being assessed for  Admission    I agree that at least one RN has performed a thorough Head to Toe Skin Assessment on the patient. ALL assessment sites listed below have been assessed. Areas assessed by both nurses:    Head, Face, Ears, Shoulders, Back, Chest, Arms, Elbows, Hands, Sacrum. Buttock, Coccyx, Ischium, and Legs. Feet and Heels        Does the Patient have a Wound?  No noted wound(s)       -scar in abdomen from previous surgery    Maco Prevention initiated by RN: No  Wound Care Orders initiated by RN: No    Pressure Injury (Stage 3,4, Unstageable, DTI, NWPT, and Complex wounds) if present, place Wound referral order by RN under : No    New Ostomies, if present place, Ostomy referral order under : No     Nurse 1 eSignature: Electronically signed by Magda Patrick RN on 9/3/23 at 4:35 AM EDT    **SHARE this note so that the co-signing nurse can place an eSignature**    Nurse 2 eSignature: Electronically signed by Evan Horner RN on 9/3/23 at 6:40 AM EDT

## 2023-09-03 NOTE — PROGRESS NOTES
Comprehensive Nutrition Assessment    Type and Reason for Visit:  Initial, Positive Nutrition Screen, Consult    Nutrition Recommendations/Plan:   Continue carb controlled, cardiac diet  Encourage po intakes  Glucerna ONS BID  Monitor po intakes, nutrition adequacy, weights, pertinent labs, BMs     Malnutrition Assessment:  Malnutrition Status: At risk for malnutrition (Comment) (09/03/23 1504)      Nutrition Assessment:    Consult for CHF diet education. Positive nutrition screen for weight loss and decreased appetite. Pt with PMHx of HTN, CAD, CKD, CHF, remote history of TIA, and solitary left kidney who presented with complaint of diffuse abdominal pain x1 day with concurrent nausea, vomiting, and diarrhea. Currently on a carb controlled, cardiac diet. One po intake of % recorded per EMR. Pt reports that her appetite is fair. Pt reports that she has been drinking about 2 Ensure plus eating three meals per day. Will add Glucerna to order. Pt denied having any weight changes. No weight loss noted over past six months per EMR. Provided pt with handout on CHF nutrition. Pt declined need for verbal review. Encouraged po intakes. Will monitor. Nutrition Related Findings:    -263 mg/dL x 24 hr Wound Type: None       Current Nutrition Intake & Therapies:    Average Meal Intake: % (one intake recorded)  Average Supplements Intake: Unable to assess, None Ordered  ADULT DIET; Regular; 4 carb choices (60 gm/meal); Low Fat/Low Chol/High Fiber/2 gm Na  ADULT ORAL NUTRITION SUPPLEMENT; Breakfast, Lunch; Diabetic Oral Supplement    Anthropometric Measures:  Height: 5' 3\" (160 cm)  Ideal Body Weight (IBW): 115 lbs (52 kg)       Current Body Weight: 120 lb 2.4 oz (54.5 kg),   IBW. Weight Source: Bed Scale  Current BMI (kg/m2): 21.3                          BMI Categories: Normal Weight (BMI 18.5-24. 9)    Estimated Daily Nutrient Needs:  Energy Requirements Based On: Kcal/kg  Weight Used for Energy

## 2023-09-03 NOTE — CONSULTS
NEPHROLOGY CONSULT NOTE  [facility]    ASSESSMENT and PLAN:    Renal failure: Solitary functioning left kidney  She presented with a creatinine 1.9  She apparently has history of renal failure, according to the information in the epic system, she had intermittent abnormal serum creatinine since May 2021. Her UA showed protein over 300 with glucose 500, specific gravity was also very high, over 1.03    Her most recent blood pressure was 133/90    CT scan of the abdomen from 9/2/2023 showed absent right kidney and adrenal gland. The patient's left kidney and adrenal gland are \"unremarkable\". We will hold off on any further testing  I have reviewed her medication list and did not see any concerning medications    Hypertension:  As of now, she is getting amlodipine 5 mg p.o. once daily and prazosin 2 mg p.o. nightly. In her case, an RAAS blocker would be a better option  So I will add low-dose starting at 50 mg p.o. once daily. Anemia:  The patient hemoglobin level is 11.3 which is much better. Back in March 2023, her anemia was much worse. S: Abdominal pain    HPI:  The patient is 58years old, initially presented to [facility] emergency room on 9/2/2023 for the evaluation of sudden onset of abdominal pain. Per patient, after she ate her lunch, she had developed sudden onset of severe abdominal pain and worsening diarrhea and nausea and vomiting. Because of that, she presented to the hospital for further evaluation. She has history of colon cancer, status post surgery in March 2023. Per patient, ever since her colon surgery, she has some diarrhea, but her diarrhea at this time was much worse than her usual diarrhea. She has no fever but she a lot of chills on a daily basis. Upon arrival to the emergency room her initial vital signs showed blood pressure 124/97 with heart rate of 74 and temperature 97.5.   She had a CT scan of her abdomen which showed fluid-filled small of bilateral carotid arteries    Other lack of coordination    Repeated falls    Unspecified abnormalities of gait and mobility    Acute diarrhea     Current Facility-Administered Medications   Medication Dose Route Frequency Provider Last Rate Last Admin    gabapentin (NEURONTIN) capsule 200 mg  200 mg Oral TID Linda Michael APRN - CNP   200 mg at 09/03/23 0055    amLODIPine (NORVASC) tablet 5 mg  5 mg Oral Daily Linda Michael APRN - CNP        aspirin EC tablet 81 mg  81 mg Oral Daily Linda Michael APRN - CNP        atorvastatin (LIPITOR) tablet 20 mg  20 mg Oral Daily Linda Michael, APRN - CNP        insulin glargine (LANTUS) injection vial 12 Units  12 Units SubCUTAneous Nightly Linda Michael APRN - CNP   12 Units at 09/02/23 2359    paliperidone (INVEGA) extended release tablet 9 mg  9 mg Oral QAM Linda Michael APRN - CNP        pantoprazole (PROTONIX) tablet 40 mg  40 mg Oral Daily Linda Michael APRN - CNP        prazosin (MINIPRESS) capsule 2 mg  2 mg Oral Nightly Linda Michael, APRN - CNP   2 mg at 09/03/23 0000    ticagrelor (BRILINTA) tablet 90 mg  90 mg Oral BID Linda Michael, APRN - CNP   90 mg at 09/02/23 2359    traZODone (DESYREL) tablet 200 mg  200 mg Oral Nightly Linda Michael, APRN - CNP   200 mg at 09/03/23 0000    sodium chloride flush 0.9 % injection 5-40 mL  5-40 mL IntraVENous 2 times per day Linda Michael, APRN - CNP        sodium chloride flush 0.9 % injection 5-40 mL  5-40 mL IntraVENous PRN Linda Michael, APRN - CNP        0.9 % sodium chloride infusion   IntraVENous PRN Linda Michael, APRN - CNP        enoxaparin Sodium (LOVENOX) injection 30 mg  30 mg SubCUTAneous Daily Linda Michael, APRN - CNP        ondansetron (ZOFRAN-ODT) disintegrating tablet 4 mg  4 mg Oral Q8H PRN Linda Michael, APRN - CNP        Or    ondansetron (ZOFRAN) injection 4 mg  4 mg IntraVENous Q6H PRN Linda Fernanda, APRN - CNP        acetaminophen (TYLENOL) tablet 650 mg  650 mg

## 2023-09-03 NOTE — PROGRESS NOTES
V2.0    [facility] Progress Note      Name:  Mica Hogan /Age/Sex: 1960  (58 y.o. female)   MRN & CSN:  [id number] & [id number] Encounter Date/Time: 9/3/2023 1:24 PM EDT   Location:  [address] [place] [address]- PCP: Darell Lugo     Attending:Álvaro Guido, [address] [place] [address] Day: 2    Assessment and Recommendations     Patient is a 50-year-old female past medical history of CAD, hypertension, CKD, congestive heart failure, TIA, solitary left kidney who admitted for diarrhea, acute kidney injury,    #. Acute diarrhea likely due to C. difficile enterocolitis  Discontinue metronidazole, ceftriaxone  Start p.o. vancomycin  Continue IV fluid hydration  Continue to monitor renal function, lites closely    #. Acute kidney injury in a patient with solitary left kidney status post right nephrectomy due to neoplasm  Creatinine improved  Avoid neurotoxic medication  Continue IV fluid hydration    #. Diabetes mellitus, type II with hyperglycemia  A1c  8.6  Continue current insulin regimen    #. History of systolic congestive heart failure: Appears euvolemic  Continue to hold lisinopril due to acute kidney injury  Continue IV fluid hydration  Avoid for volume overload    #. History of CAD  Continue dual antiplatelet, statins        DVT Prophylaxis: Heparin  Code Status: Total Support. Barrier to DC: Pending clinical improvement, nephrology clearance        Subjective:     Patient was seen and examined today. She states she is having diarrhea for the past few months. Tested positive for C. difficile infection. .  No acute events overnight. Patient mario Stable Vital Signs. Review of Systems:      Pertinent positives and negatives discussed in HPI    Objective:      Intake/Output Summary (Last 24 hours) at 9/3/2023 1324  Last data filed at 9/3/2023 1044  Gross per 24 hour   Intake 600 ml   Output --   Net 600 ml      Vitals:   Vitals:    23 0000 23 0345 23 0406 23 0900   BP: (!) 147/75 (!) 133/90

## 2023-09-03 NOTE — H&P
Hospital Medicine History & Physical        Date of Service: 09/02/2023    Time of Service: 2200    Disposition:    [x]Admitted to inpatient status with expected LOS greater than two midnights due to medical therapy. []Placed in observation status. Chief Admission Complaint:  abdominal pain, n/v/d at home x 1 day    Presenting Admission History:      Dub Dec is a/an 58 y.o. female with a significant past medical history of hypertension, CAD, CKD, CHF, remote history of TIA, and solitary left kidney who presents to [facility] emergency department with complaint of diffuse abdominal pain x1 day with concurrent nausea, vomiting, and diarrhea that all began today as well. She notes that she was feeling fine earlier in the morning, went to eat out had a large lunch to include meat loaf and pot roast and notes approximately an hour or so later she began having abdominal pain and shortly after that nausea vomiting and diarrhea. States that she vomited numerous times along with numerous bouts of diarrhea. Denies any visualization of blood. She notes that she did get weak and clammy at home which concerned her so she decided come here for an evaluation. Her evaluation here included laboratory studies and CT of the abdomen pelvis. CT showed probable mild infectious or inflammatory enterocolitis with associated ileus. Pertinent laboratory values include sodium 135, BUN 26, creatinine 1.9, GFR 29, blood sugar 263, alkaline phosphatase 313, ALT 56, normal lipase. Cell count showed WBC of 8.3, hemoglobin 10.3, and platelets 701. Urinalysis showed 400 glucose, trace ketones, moderate blood and greater than 300 protein without any nitrites or leukocyte esterase; microscopy showed 1-3 cell casts, 6-9 urinary WBCs, 5-10 urinary RBCs, 6-10 epithelial cells, 3+ bacteria. She received saline bolus and fentanyl in the emergency department.   Hospital team was consulted to admit this patient for acute Benign:no cervical cancer per pt'    KIDNEY REMOVAL      right    OTHER SURGICAL HISTORY Right     orif right ankle    TEMPORAL ARTERY BIOPSY Right 8/9/2021    RIGHT TEMPORAL ARTERY BIOPSY LIGATION performed by Antelmo Harry MD at [address] [facility] 1/29/2021    EGD BIOPSY performed by Christina Arellano MD at [facility] 12/15/2022    EGD BIOPSY performed by Christina Arellano MD at [address] [employer]       Medications Prior to Admission:   Prior to Admission medications    Medication Sig Start Date End Date Taking? Authorizing Provider   oxyCODONE-acetaminophen (PERCOCET) 7.5-325 MG per tablet Take 1 tablet by mouth every 4 hours as needed for Pain. Max Daily Amount: 6 tablets   Yes Historical Provider, MD   gabapentin (NEURONTIN) 600 MG tablet Take 1 tablet by mouth 3 times daily. Yes Historical Provider, MD   insulin glargine (LANTUS SOLOSTAR) 100 UNIT/ML injection pen Inject 12 units daily 8/10/23   Grant Chi MD   glimepiride (AMARYL) 4 MG tablet Take 1 tablet by mouth every morning (before breakfast) 8/10/23   Grant Chi MD   LISINOPRIL PO Take by mouth She is not sure of mg  Patient not taking: Reported on 8/10/2023    Historical Provider, MD   clonazePAM (KLONOPIN) 0.5 MG tablet as needed. 5/1/23   Historical Provider, MD   AUSTEDO 9 MG tablet  5/1/23   Historical Provider, MD   prazosin (MINIPRESS) 2 MG capsule Take 1 capsule by mouth nightly 5/1/23   Historical Provider, MD   traZODone (DESYREL) 100 MG tablet Take 2 tablets by mouth nightly    Historical Provider, MD   gabapentin (NEURONTIN) 100 MG capsule Take 2 capsules by mouth 3 times daily for 30 days.  3/15/23 4/14/23  Jennifer Allen APRN - CNP   amLODIPine (NORVASC) 5 MG tablet Take 1 tablet by mouth daily 3/16/23   Jennifer Allen APRN - CNP   insulin lispro, 1 Unit Dial, (HUMALOG/ADMELOG) 100 UNIT/ML SOPN With meals                   - at

## 2023-09-03 NOTE — PROGRESS NOTES
Patient admitted to room 362 from ED. Patient oriented to room, call light, bed rails, phone, lights and bathroom. Patient instructed about the schedule of the day including: vital sign frequency, lab draws, possible tests, frequency of MD and staff rounds, including RN/MD rounding together at bedside, daily weights, and I &O's. Patient instructed about prescribed diet, how to use 8MENU, and television. With bed alarm in place, patient aware of placement and reason. Bed locked, in lowest position, side rails up 2/4, call light within reach. Will continue to monitor.

## 2023-09-03 NOTE — PLAN OF CARE
Patient's EF (Ejection Fraction) is greater than 40% (2021)    Heart Failure Medications:  Diuretics[de-identified] None    (One of the following REQUIRED for EF </= 40%/SYSTOLIC FAILURE but MAY be used in EF% >40%/DIASTOLIC FAILURE)        ACE[de-identified] Lisinopril (on hold)        ARB[de-identified] None         ARNI[de-identified] None    (Beta Blockers)  NON- Evidenced Based Beta Blocker (for EF% >40%/DIASTOLIC FAILURE): None    Evidenced Based Beta Blocker::(REQUIRED for EF% <40%/SYSTOLIC FAILURE) None  . .................................................................................................................................................. Healthy Weight Tracking - BMI + Meds 3/14/2023 3/27/2023 4/17/2023 5/10/2023 8/10/2023 9/2/2023 9/3/2023   Weight - 114 lb 117 lb 119 lb 120 lb 119 lb 120 lb 2.4 oz   Height 5' 3\" 5' 3\" 5' 3\" 5' 3\" 5' 3\" - 5' 3\"   Body Mass Index - 20.19 kg/m2 20.72 kg/m2 21.08 kg/m2 21.26 kg/m2 - 21.28 kg/m2   Some recent data might be hidden         Patient's weights and intake/output reviewed: Yes    Daily Weight log at bedside, patient/family participation in use of log: \"yes    Patient's Last Weight: 54.5 kg obtained by bed scale.        Intake/Output Summary (Last 24 hours) at 9/3/2023 0527  Last data filed at 9/3/2023 0000  Gross per 24 hour   Intake 240 ml   Output --   Net 240 ml       Education Booklet Provided: yes    Comorbidities Reviewed Yes    Patient has a past medical history of Abnormal brain MRI, Acute bilateral low back pain without sciatica, SHARRON (acute kidney injury) ([address]), Arthritis, Bipolar disorder ([address]), CAD (coronary artery disease), Cancer ([address]), Carotid stenosis, bilateral:<50%:per US 7/2016, Carpal tunnel syndrome, Cervical cancer screening, Coronary artery disease of native artery of native heart with stable angina pectoris ([address]), DDD (degenerative disc disease), lumbar, Depression, Depression/anxiety, Depression/anxiety, Diabetes mellitus ([address]), Gout, History of mammogram, History of therapeutic

## 2023-09-03 NOTE — ED PROVIDER NOTES
Attending Supervisory Note/Shared Visit     I personally saw the patient and performed a substantive portion of the visit including all aspects of the medical decision making as addressed:      50-year-old female who presents with nausea vomiting, abdominal cramping after she ate at [employer] to have CT findings consistent with enteric colitis, likely secondary to food poisoning, also found to have SHARRON, she has a single kidney status post nephrectomy from prior renal cell carcinoma. She has increase in Cr from baseline. Given SHARRON int he setting of single kidney req admission for observation. GI panel sent and pt found to have Cdiff at the time of this documentation. Admitted to the hospitalist.      Medications      sodium chloride 0.9 % bolus 1,000 mL (0 mLs IntraVENous Stopped 9/2/23 2211)   fentaNYL (SUBLIMAZE) injection 50 mcg (50 mcg IntraVENous Given 9/2/23 2038)             I, Dr. Oralia Valles, am the primary clinician of record on the case. I otherwise agree with the documentation performed by the resident/BETTY. FINAL IMPRESSION      1. Enterocolitis    2.  Acute kidney failure with tubular necrosis [facility])          DISPOSITION/PLAN   DISPOSITION Decision To Admit 09/02/2023 09:45:05 PM        Nydia Fontaine MD  Attending Emergency Physician       Nydia Fontaine MD  09/03/23 9694

## 2023-09-03 NOTE — PROGRESS NOTES
Patient as complained of abdominal pain all day. Have reached out to provider on two different occasions. Attending stated he \"wanted to be on the cautious side, PO vancomycin was added and that is what she needs\". As does not want to start anything else.

## 2023-09-04 LAB
ALBUMIN SERPL-MCNC: 2.8 G/DL (ref 3.4–5)
ALBUMIN/GLOB SERPL: 0.7 {RATIO} (ref 1.1–2.2)
ALP SERPL-CCNC: 194 U/L (ref 40–129)
ALT SERPL-CCNC: 26 U/L (ref 10–40)
ANION GAP SERPL CALCULATED.3IONS-SCNC: 5 MMOL/L (ref 3–16)
AST SERPL-CCNC: 14 U/L (ref 15–37)
BASOPHILS # BLD: 0 K/UL (ref 0–0.2)
BASOPHILS NFR BLD: 0.2 %
BILIRUB SERPL-MCNC: 0.3 MG/DL (ref 0–1)
BUN SERPL-MCNC: 25 MG/DL (ref 7–20)
CALCIUM SERPL-MCNC: 8.6 MG/DL (ref 8.3–10.6)
CHLORIDE SERPL-SCNC: 113 MMOL/L (ref 99–110)
CO2 SERPL-SCNC: 18 MMOL/L (ref 21–32)
CREAT SERPL-MCNC: 1.5 MG/DL (ref 0.6–1.2)
DEPRECATED RDW RBC AUTO: 13.6 % (ref 12.4–15.4)
EOSINOPHIL # BLD: 0.1 K/UL (ref 0–0.6)
EOSINOPHIL NFR BLD: 1.6 %
G LAMBLIA AG STL QL IA: NORMAL
GFR SERPLBLD CREATININE-BSD FMLA CKD-EPI: 39 ML/MIN/{1.73_M2}
GLUCOSE BLD-MCNC: 139 MG/DL (ref 70–99)
GLUCOSE BLD-MCNC: 173 MG/DL (ref 70–99)
GLUCOSE BLD-MCNC: 214 MG/DL (ref 70–99)
GLUCOSE BLD-MCNC: 309 MG/DL (ref 70–99)
GLUCOSE SERPL-MCNC: 157 MG/DL (ref 70–99)
HCT VFR BLD AUTO: 27.1 % (ref 36–48)
HGB BLD-MCNC: 8.6 G/DL (ref 12–16)
LYMPHOCYTES # BLD: 1.6 K/UL (ref 1–5.1)
LYMPHOCYTES NFR BLD: 44.1 %
MCH RBC QN AUTO: 27.5 PG (ref 26–34)
MCHC RBC AUTO-ENTMCNC: 31.8 G/DL (ref 31–36)
MCV RBC AUTO: 86.6 FL (ref 80–100)
MONOCYTES # BLD: 0.3 K/UL (ref 0–1.3)
MONOCYTES NFR BLD: 9.3 %
NEUTROPHILS # BLD: 1.6 K/UL (ref 1.7–7.7)
NEUTROPHILS NFR BLD: 44.8 %
PERFORMED ON: ABNORMAL
PLATELET # BLD AUTO: 144 K/UL (ref 135–450)
PMV BLD AUTO: 9.3 FL (ref 5–10.5)
POTASSIUM SERPL-SCNC: 5.2 MMOL/L (ref 3.5–5.1)
PROT SERPL-MCNC: 6.6 G/DL (ref 6.4–8.2)
RBC # BLD AUTO: 3.13 M/UL (ref 4–5.2)
SODIUM SERPL-SCNC: 136 MMOL/L (ref 136–145)
WBC # BLD AUTO: 3.7 K/UL (ref 4–11)

## 2023-09-04 PROCEDURE — 1200000000 HC SEMI PRIVATE

## 2023-09-04 PROCEDURE — 85025 COMPLETE CBC W/AUTO DIFF WBC: CPT

## 2023-09-04 PROCEDURE — 6370000000 HC RX 637 (ALT 250 FOR IP): Performed by: NURSE PRACTITIONER

## 2023-09-04 PROCEDURE — 2500000003 HC RX 250 WO HCPCS: Performed by: HOSPITALIST

## 2023-09-04 PROCEDURE — 80053 COMPREHEN METABOLIC PANEL: CPT

## 2023-09-04 PROCEDURE — 6360000002 HC RX W HCPCS: Performed by: NURSE PRACTITIONER

## 2023-09-04 PROCEDURE — 2580000003 HC RX 258: Performed by: NURSE PRACTITIONER

## 2023-09-04 PROCEDURE — 6370000000 HC RX 637 (ALT 250 FOR IP): Performed by: INTERNAL MEDICINE

## 2023-09-04 PROCEDURE — 6370000000 HC RX 637 (ALT 250 FOR IP): Performed by: HOSPITALIST

## 2023-09-04 PROCEDURE — 36415 COLL VENOUS BLD VENIPUNCTURE: CPT

## 2023-09-04 RX ORDER — OXYCODONE AND ACETAMINOPHEN 7.5; 325 MG/1; MG/1
1 TABLET ORAL EVERY 4 HOURS PRN
Status: DISCONTINUED | OUTPATIENT
Start: 2023-09-04 | End: 2023-09-07 | Stop reason: HOSPADM

## 2023-09-04 RX ORDER — SODIUM BICARBONATE 650 MG/1
1300 TABLET ORAL 2 TIMES DAILY
Status: DISCONTINUED | OUTPATIENT
Start: 2023-09-04 | End: 2023-09-07 | Stop reason: HOSPADM

## 2023-09-04 RX ADMIN — Medication 125 MG: at 12:21

## 2023-09-04 RX ADMIN — LOSARTAN POTASSIUM 50 MG: 25 TABLET, FILM COATED ORAL at 07:45

## 2023-09-04 RX ADMIN — PANTOPRAZOLE SODIUM 40 MG: 40 TABLET, DELAYED RELEASE ORAL at 07:45

## 2023-09-04 RX ADMIN — PRAZOSIN HYDROCHLORIDE 2 MG: 1 CAPSULE ORAL at 20:38

## 2023-09-04 RX ADMIN — INSULIN LISPRO 6 UNITS: 100 INJECTION, SOLUTION INTRAVENOUS; SUBCUTANEOUS at 17:42

## 2023-09-04 RX ADMIN — SODIUM BICARBONATE 1300 MG: 650 TABLET ORAL at 20:38

## 2023-09-04 RX ADMIN — OXYCODONE HYDROCHLORIDE AND ACETAMINOPHEN 1 TABLET: 7.5; 325 TABLET ORAL at 21:53

## 2023-09-04 RX ADMIN — OXYCODONE HYDROCHLORIDE AND ACETAMINOPHEN 1 TABLET: 7.5; 325 TABLET ORAL at 07:45

## 2023-09-04 RX ADMIN — ATORVASTATIN CALCIUM 20 MG: 10 TABLET, FILM COATED ORAL at 07:45

## 2023-09-04 RX ADMIN — OXYCODONE HYDROCHLORIDE AND ACETAMINOPHEN 1 TABLET: 7.5; 325 TABLET ORAL at 00:46

## 2023-09-04 RX ADMIN — Medication 125 MG: at 06:33

## 2023-09-04 RX ADMIN — TICAGRELOR 90 MG: 90 TABLET ORAL at 20:38

## 2023-09-04 RX ADMIN — GABAPENTIN 200 MG: 100 CAPSULE ORAL at 20:38

## 2023-09-04 RX ADMIN — ENOXAPARIN SODIUM 30 MG: 100 INJECTION SUBCUTANEOUS at 07:47

## 2023-09-04 RX ADMIN — PALIPERIDONE 9 MG: 3 TABLET, EXTENDED RELEASE ORAL at 07:46

## 2023-09-04 RX ADMIN — ASPIRIN 81 MG: 81 TABLET, COATED ORAL at 07:45

## 2023-09-04 RX ADMIN — GABAPENTIN 200 MG: 100 CAPSULE ORAL at 07:45

## 2023-09-04 RX ADMIN — GABAPENTIN 200 MG: 100 CAPSULE ORAL at 14:02

## 2023-09-04 RX ADMIN — OXYCODONE HYDROCHLORIDE AND ACETAMINOPHEN 1 TABLET: 7.5; 325 TABLET ORAL at 14:02

## 2023-09-04 RX ADMIN — TICAGRELOR 90 MG: 90 TABLET ORAL at 07:45

## 2023-09-04 RX ADMIN — INSULIN GLARGINE 12 UNITS: 100 INJECTION, SOLUTION SUBCUTANEOUS at 20:40

## 2023-09-04 RX ADMIN — Medication 125 MG: at 00:03

## 2023-09-04 RX ADMIN — SODIUM CHLORIDE: 9 INJECTION, SOLUTION INTRAVENOUS at 06:39

## 2023-09-04 RX ADMIN — Medication 125 MG: at 17:42

## 2023-09-04 RX ADMIN — TRAZODONE HYDROCHLORIDE 200 MG: 50 TABLET ORAL at 20:38

## 2023-09-04 RX ADMIN — INSULIN LISPRO 2 UNITS: 100 INJECTION, SOLUTION INTRAVENOUS; SUBCUTANEOUS at 12:53

## 2023-09-04 ASSESSMENT — PAIN SCALES - GENERAL
PAINLEVEL_OUTOF10: 6
PAINLEVEL_OUTOF10: 0
PAINLEVEL_OUTOF10: 9
PAINLEVEL_OUTOF10: 7
PAINLEVEL_OUTOF10: 8
PAINLEVEL_OUTOF10: 7

## 2023-09-04 ASSESSMENT — PAIN DESCRIPTION - ONSET: ONSET: ON-GOING

## 2023-09-04 ASSESSMENT — PAIN DESCRIPTION - LOCATION
LOCATION: BACK
LOCATION: HEAD;BACK
LOCATION_2: ABDOMEN
LOCATION: BACK

## 2023-09-04 ASSESSMENT — PAIN - FUNCTIONAL ASSESSMENT
PAIN_FUNCTIONAL_ASSESSMENT_SITE2: ACTIVITIES ARE NOT PREVENTED
PAIN_FUNCTIONAL_ASSESSMENT: PREVENTS OR INTERFERES SOME ACTIVE ACTIVITIES AND ADLS

## 2023-09-04 ASSESSMENT — PAIN DESCRIPTION - DESCRIPTORS
DESCRIPTORS: ACHING
DESCRIPTORS: ACHING
DESCRIPTORS_2: CRAMPING

## 2023-09-04 ASSESSMENT — PAIN DESCRIPTION - FREQUENCY: FREQUENCY: CONTINUOUS

## 2023-09-04 ASSESSMENT — PAIN DESCRIPTION - PAIN TYPE: TYPE: CHRONIC PAIN

## 2023-09-04 ASSESSMENT — PAIN DESCRIPTION - INTENSITY: RATING_2: 2

## 2023-09-04 ASSESSMENT — PAIN DESCRIPTION - ORIENTATION: ORIENTATION_2: MID;LOWER

## 2023-09-04 NOTE — PLAN OF CARE
Patient's EF (Ejection Fraction) is greater than 40%    Heart Failure Medications:  Diuretics[de-identified] None    (One of the following REQUIRED for EF </= 40%/SYSTOLIC FAILURE but MAY be used in EF% >40%/DIASTOLIC FAILURE)        ACE[de-identified] Lisinopril (held for SHARRON)        ARB[de-identified] None         ARNI[de-identified] None    (Beta Blockers)  NON- Evidenced Based Beta Blocker (for EF% >40%/DIASTOLIC FAILURE): None    Evidenced Based Beta Blocker::(REQUIRED for EF% <40%/SYSTOLIC FAILURE) None  . .................................................................................................................................................. Healthy Weight Tracking - BMI + Meds 3/14/2023 3/27/2023 4/17/2023 5/10/2023 8/10/2023 9/2/2023 9/3/2023   Weight - 114 lb 117 lb 119 lb 120 lb 119 lb 120 lb 2.4 oz   Height 5' 3\" 5' 3\" 5' 3\" 5' 3\" 5' 3\" - 5' 3\"   Body Mass Index - 20.19 kg/m2 20.72 kg/m2 21.08 kg/m2 21.26 kg/m2 - 21.28 kg/m2   Some recent data might be hidden         Patient's weights and intake/output reviewed: Yes    Daily Weight log at bedside, patient/family participation in use of log: \"yes    Patient's Last Weight: 120 lbs obtained by standing scale. Difference of *** lbs {Blank:51798::\"more\",\"less\"} than last documented weight.       Intake/Output Summary (Last 24 hours) at 9/4/2023 0436  Last data filed at 9/4/2023 0159  Gross per 24 hour   Intake 840 ml   Output --   Net 840 ml       Education Booklet Provided: yes    Comorbidities Reviewed Yes    Patient has a past medical history of Abnormal brain MRI, Acute bilateral low back pain without sciatica, SHARRON (acute kidney injury) ([address]), Arthritis, Bipolar disorder ([address]), CAD (coronary artery disease), Cancer ([address]), Carotid stenosis, bilateral:<50%:per [place] 7/2016, Carpal tunnel syndrome, Cervical cancer screening, Coronary artery disease of native artery of native heart with stable angina pectoris ([address]), DDD (degenerative disc disease), lumbar, Depression, Depression/anxiety,

## 2023-09-04 NOTE — ACP (ADVANCE CARE PLANNING)
left AD documents with pt. Pt reviewing and acquiring addresses of agents.  to follow up.   Hyla Opitz

## 2023-09-04 NOTE — PROGRESS NOTES
Pt has chronic back pain and new abd cramping pain. Pt has lost 103 labs in last year per pt. Pt on everett and percocet at home for chronic pain. Pt given lower dose everett, heat and ice packs here. Pt reports no relief from tylenol. Md notified of pt pain and bentyl started. AM RN stated pt had 5x BM today and has had 3x Bms since 7pm. RN will continue to assess pain and monitor output. Pt otherwise without complaints.

## 2023-09-05 LAB
ALBUMIN SERPL-MCNC: 2.8 G/DL (ref 3.4–5)
ALBUMIN/GLOB SERPL: 0.7 {RATIO} (ref 1.1–2.2)
ALP SERPL-CCNC: 201 U/L (ref 40–129)
ALT SERPL-CCNC: 22 U/L (ref 10–40)
ANION GAP SERPL CALCULATED.3IONS-SCNC: 6 MMOL/L (ref 3–16)
AST SERPL-CCNC: 13 U/L (ref 15–37)
BASOPHILS # BLD: 0 K/UL (ref 0–0.2)
BASOPHILS NFR BLD: 0.8 %
BILIRUB SERPL-MCNC: 0.3 MG/DL (ref 0–1)
BUN SERPL-MCNC: 22 MG/DL (ref 7–20)
CALCIUM SERPL-MCNC: 8.7 MG/DL (ref 8.3–10.6)
CHLORIDE SERPL-SCNC: 116 MMOL/L (ref 99–110)
CO2 SERPL-SCNC: 18 MMOL/L (ref 21–32)
CREAT SERPL-MCNC: 1.4 MG/DL (ref 0.6–1.2)
DEPRECATED RDW RBC AUTO: 13.3 % (ref 12.4–15.4)
EOSINOPHIL # BLD: 0.1 K/UL (ref 0–0.6)
EOSINOPHIL NFR BLD: 2.2 %
GFR SERPLBLD CREATININE-BSD FMLA CKD-EPI: 42 ML/MIN/{1.73_M2}
GI PATHOGENS PNL STL NAA+PROBE: NORMAL
GLUCOSE BLD-MCNC: 141 MG/DL (ref 70–99)
GLUCOSE BLD-MCNC: 230 MG/DL (ref 70–99)
GLUCOSE BLD-MCNC: 244 MG/DL (ref 70–99)
GLUCOSE BLD-MCNC: 340 MG/DL (ref 70–99)
GLUCOSE SERPL-MCNC: 216 MG/DL (ref 70–99)
HCT VFR BLD AUTO: 27.1 % (ref 36–48)
HGB BLD-MCNC: 8.5 G/DL (ref 12–16)
INR PPP: 0.99 (ref 0.84–1.16)
LYMPHOCYTES # BLD: 1.7 K/UL (ref 1–5.1)
LYMPHOCYTES NFR BLD: 38.4 %
MCH RBC QN AUTO: 27.5 PG (ref 26–34)
MCHC RBC AUTO-ENTMCNC: 31.5 G/DL (ref 31–36)
MCV RBC AUTO: 87.5 FL (ref 80–100)
MONOCYTES # BLD: 0.4 K/UL (ref 0–1.3)
MONOCYTES NFR BLD: 8.5 %
NEUTROPHILS # BLD: 2.2 K/UL (ref 1.7–7.7)
NEUTROPHILS NFR BLD: 50.1 %
PERFORMED ON: ABNORMAL
PLATELET # BLD AUTO: 169 K/UL (ref 135–450)
PMV BLD AUTO: 9.6 FL (ref 5–10.5)
POTASSIUM SERPL-SCNC: 5.6 MMOL/L (ref 3.5–5.1)
PROT SERPL-MCNC: 6.6 G/DL (ref 6.4–8.2)
PROTHROMBIN TIME: 13.1 SEC (ref 11.5–14.8)
RBC # BLD AUTO: 3.1 M/UL (ref 4–5.2)
SODIUM SERPL-SCNC: 140 MMOL/L (ref 136–145)
WBC # BLD AUTO: 4.3 K/UL (ref 4–11)

## 2023-09-05 PROCEDURE — 6370000000 HC RX 637 (ALT 250 FOR IP): Performed by: HOSPITALIST

## 2023-09-05 PROCEDURE — 85025 COMPLETE CBC W/AUTO DIFF WBC: CPT

## 2023-09-05 PROCEDURE — 6370000000 HC RX 637 (ALT 250 FOR IP): Performed by: INTERNAL MEDICINE

## 2023-09-05 PROCEDURE — C1751 CATH, INF, PER/CENT/MIDLINE: HCPCS

## 2023-09-05 PROCEDURE — 85610 PROTHROMBIN TIME: CPT

## 2023-09-05 PROCEDURE — 6360000002 HC RX W HCPCS: Performed by: NURSE PRACTITIONER

## 2023-09-05 PROCEDURE — 36569 INSJ PICC 5 YR+ W/O IMAGING: CPT

## 2023-09-05 PROCEDURE — 6370000000 HC RX 637 (ALT 250 FOR IP): Performed by: NURSE PRACTITIONER

## 2023-09-05 PROCEDURE — 36415 COLL VENOUS BLD VENIPUNCTURE: CPT

## 2023-09-05 PROCEDURE — 1200000000 HC SEMI PRIVATE

## 2023-09-05 PROCEDURE — 80053 COMPREHEN METABOLIC PANEL: CPT

## 2023-09-05 PROCEDURE — 2500000003 HC RX 250 WO HCPCS: Performed by: HOSPITALIST

## 2023-09-05 PROCEDURE — 2580000003 HC RX 258: Performed by: INTERNAL MEDICINE

## 2023-09-05 RX ORDER — ENOXAPARIN SODIUM 100 MG/ML
40 INJECTION SUBCUTANEOUS DAILY
Status: DISCONTINUED | OUTPATIENT
Start: 2023-09-06 | End: 2023-09-07 | Stop reason: HOSPADM

## 2023-09-05 RX ORDER — SODIUM CHLORIDE, SODIUM GLUCONATE, SODIUM ACETATE, POTASSIUM CHLORIDE AND MAGNESIUM CHLORIDE 526; 502; 368; 37; 30 MG/100ML; MG/100ML; MG/100ML; MG/100ML; MG/100ML
100 INJECTION, SOLUTION INTRAVENOUS CONTINUOUS
Status: DISCONTINUED | OUTPATIENT
Start: 2023-09-05 | End: 2023-09-07 | Stop reason: HOSPADM

## 2023-09-05 RX ADMIN — Medication 125 MG: at 23:57

## 2023-09-05 RX ADMIN — PRAZOSIN HYDROCHLORIDE 2 MG: 1 CAPSULE ORAL at 20:50

## 2023-09-05 RX ADMIN — GABAPENTIN 200 MG: 100 CAPSULE ORAL at 20:50

## 2023-09-05 RX ADMIN — INSULIN LISPRO 4 UNITS: 100 INJECTION, SOLUTION INTRAVENOUS; SUBCUTANEOUS at 20:51

## 2023-09-05 RX ADMIN — SODIUM BICARBONATE 1300 MG: 650 TABLET ORAL at 20:49

## 2023-09-05 RX ADMIN — TICAGRELOR 90 MG: 90 TABLET ORAL at 07:58

## 2023-09-05 RX ADMIN — OXYCODONE HYDROCHLORIDE AND ACETAMINOPHEN 1 TABLET: 7.5; 325 TABLET ORAL at 07:57

## 2023-09-05 RX ADMIN — Medication 125 MG: at 12:52

## 2023-09-05 RX ADMIN — PALIPERIDONE 9 MG: 3 TABLET, EXTENDED RELEASE ORAL at 09:17

## 2023-09-05 RX ADMIN — INSULIN GLARGINE 12 UNITS: 100 INJECTION, SOLUTION SUBCUTANEOUS at 20:50

## 2023-09-05 RX ADMIN — Medication 125 MG: at 05:47

## 2023-09-05 RX ADMIN — SODIUM CHLORIDE, SODIUM GLUCONATE, SODIUM ACETATE, POTASSIUM CHLORIDE AND MAGNESIUM CHLORIDE 100 ML/HR: 526; 502; 368; 37; 30 INJECTION, SOLUTION INTRAVENOUS at 17:17

## 2023-09-05 RX ADMIN — Medication 125 MG: at 17:42

## 2023-09-05 RX ADMIN — ENOXAPARIN SODIUM 30 MG: 100 INJECTION SUBCUTANEOUS at 07:58

## 2023-09-05 RX ADMIN — LOSARTAN POTASSIUM 50 MG: 25 TABLET, FILM COATED ORAL at 07:57

## 2023-09-05 RX ADMIN — SODIUM BICARBONATE 1300 MG: 650 TABLET ORAL at 07:57

## 2023-09-05 RX ADMIN — SODIUM ZIRCONIUM CYCLOSILICATE 10 G: 10 POWDER, FOR SUSPENSION ORAL at 10:44

## 2023-09-05 RX ADMIN — PANTOPRAZOLE SODIUM 40 MG: 40 TABLET, DELAYED RELEASE ORAL at 07:58

## 2023-09-05 RX ADMIN — TRAZODONE HYDROCHLORIDE 200 MG: 50 TABLET ORAL at 20:50

## 2023-09-05 RX ADMIN — ASPIRIN 81 MG: 81 TABLET, COATED ORAL at 07:58

## 2023-09-05 RX ADMIN — TICAGRELOR 90 MG: 90 TABLET ORAL at 20:49

## 2023-09-05 RX ADMIN — INSULIN LISPRO 2 UNITS: 100 INJECTION, SOLUTION INTRAVENOUS; SUBCUTANEOUS at 09:00

## 2023-09-05 RX ADMIN — ATORVASTATIN CALCIUM 20 MG: 10 TABLET, FILM COATED ORAL at 07:57

## 2023-09-05 RX ADMIN — Medication 125 MG: at 00:26

## 2023-09-05 RX ADMIN — OXYCODONE HYDROCHLORIDE AND ACETAMINOPHEN 1 TABLET: 7.5; 325 TABLET ORAL at 21:06

## 2023-09-05 RX ADMIN — GABAPENTIN 200 MG: 100 CAPSULE ORAL at 07:58

## 2023-09-05 RX ADMIN — GABAPENTIN 200 MG: 100 CAPSULE ORAL at 15:00

## 2023-09-05 RX ADMIN — INSULIN LISPRO 2 UNITS: 100 INJECTION, SOLUTION INTRAVENOUS; SUBCUTANEOUS at 17:43

## 2023-09-05 ASSESSMENT — PAIN DESCRIPTION - LOCATION: LOCATION: BACK

## 2023-09-05 ASSESSMENT — PAIN SCALES - GENERAL
PAINLEVEL_OUTOF10: 7
PAINLEVEL_OUTOF10: 7

## 2023-09-05 NOTE — PROGRESS NOTES
Patient's IV went bad on day shift. Day shift tried three times for a new IV. Night shift charge tried twice and called clinical.   First ICU nurse got an IV but it infiltrated when fluids turned on. Second ICU nurse got IV in right forearm and it infiltrated once it was flushed. Patient has no IV access at this time. Overnight APRN notified. Okay to leave out IV, try for PICC tomorrow.

## 2023-09-05 NOTE — PLAN OF CARE
Patient's EF (Ejection Fraction) is greater than 40%    Heart Failure Medications:  Diuretics[de-identified] None    (One of the following REQUIRED for EF </= 40%/SYSTOLIC FAILURE but MAY be used in EF% >40%/DIASTOLIC FAILURE)        ACE[de-identified] None        ARB[de-identified] None         ARNI[de-identified] None    (Beta Blockers)  NON- Evidenced Based Beta Blocker (for EF% >40%/DIASTOLIC FAILURE): None    Evidenced Based Beta Blocker::(REQUIRED for EF% <40%/SYSTOLIC FAILURE) None  . .................................................................................................................................................. Healthy Weight Tracking - BMI + Meds 4/17/2023 5/10/2023 8/10/2023 9/2/2023 9/3/2023 9/4/2023 9/5/2023   Weight 117 lb 119 lb 120 lb 119 lb 120 lb 2.4 oz 118 lb 9.6 oz 123 lb 11.2 oz   Height 5' 3\" 5' 3\" 5' 3\" - 5' 3\" - -   Body Mass Index 20.72 kg/m2 21.08 kg/m2 21.26 kg/m2 - 21.28 kg/m2 21.01 kg/m2 21.91 kg/m2   Some recent data might be hidden         Patient's weights and intake/output reviewed: Yes    Daily Weight log at bedside, patient/family participation in use of log: \"yes    Patient's Last Weight: 123 lbs obtained by standing scale. Difference of 5 lbs more than last documented weight.       Intake/Output Summary (Last 24 hours) at 9/5/2023 1906  Last data filed at 9/4/2023 2240  Gross per 24 hour   Intake 480 ml   Output --   Net 480 ml       Education Booklet Provided: yes    Comorbidities Reviewed Yes    Patient has a past medical history of Abnormal brain MRI, Acute bilateral low back pain without sciatica, SHARRON (acute kidney injury) ([address]), Arthritis, Bipolar disorder ([address]), CAD (coronary artery disease), Cancer ([address]), Carotid stenosis, bilateral:<50%:per [place] 7/2016, Carpal tunnel syndrome, Cervical cancer screening, Coronary artery disease of native artery of native heart with stable angina pectoris ([address]), DDD (degenerative disc disease), lumbar, Depression, Depression/anxiety, Depression/anxiety, Diabetes mellitus

## 2023-09-05 NOTE — CARE COORDINATION
Housing: Yes  Other Identified Issues/Barriers to RETURNING to current housing: none noted  Potential Assistance needed at discharge: N/A            Potential DME:    Patient expects to discharge to: [address] [place] [address] for transportation at discharge: Self    Financial    Payor: [address] [place] [address] / Plan: Sharla Pipes / Product Type: *No Product type* /     Does insurance require precert for SNF: Yes    Potential assistance Purchasing Medications: No  Meds-to-Beds request: Yes      [address], [address]  [address] [facility] [address]  Phone: [phone] Fax: [address] - [address] [facility], [address] [facility] [phone] [facility] [phone]  [address] [place] [id number]  Phone: [phone] Fax: [phone]    [facility], [address] [place] [address] [phone] Gabriel John [phone]  [address] [place] [address]  Phone: [phone] Fax: [phone]      Notes:    Factors facilitating achievement of predicted outcomes: Family support, Friend support, Motivated, Cooperative, and Pleasant    Barriers to discharge: Medical complications    Additional Case Management Notes: Referred to patient for d/c planning. Spoke to patient. Patient is a 58year old female admitted for chest pain. Patient lives at home alone. Patient reports she is independent in ADLs. Patient states friends assist as needed. Patient denies d/c needs. The Plan for Transition of Care is related to the following treatment goals of Enterocolitis [K52.9]  SHARRON (acute kidney injury) ([address]) [N17.9]  Acute kidney failure with tubular necrosis ([address]) [W32.0]    IF APPLICABLE: The Patient and/or patient representative Ashley Hudson and her family were provided with a choice of provider and agrees with the discharge plan.  Freedom of choice list with basic dialogue that supports the patient's individualized plan of care/goals and shares the quality data associated with the providers was provided to:     Patient Representative Name:       The Patient and/or Patient Representative Agree with the Discharge Plan?       MAYELA Page, JAMESS  Case Management Department  Ph: [phone] Fax: [phone]

## 2023-09-05 NOTE — ACP (ADVANCE CARE PLANNING)
Advance Care Planning     Advance Care Planning Inpatient Note  [facility] Department    Today's Date: 9/5/2023  Unit: [facility] B3 - MED SURG    Received request from IDT Member. Upon review of chart and communication with care team, patient's decision making abilities are not in question. . Patient was/were present in the room during visit. Goals of ACP Conversation:  Discuss advance care planning documents    Health Care Decision Makers:       Primary Decision Maker: Erasto Cisneros - [phone]    Secondary Decision Maker: CorkyMoi - Brother/Sister - [phone]    Supplemental (Other) Decision Maker: Carli Ronnie - [phone]  Summary:  Completed [facility]    Advance Care Planning Documents (Patient Wishes):  Healthcare Power of /Advance Directive Appointment of Health Care Agent     Assessment:  Patient wanted to update her healthcare power of  as her son's phone number has changed. The designation are unchanged. She continue to want resuscitation, even if she is terminal.      Interventions:  Assisted in the completion of documents according to patient's wishes at this time    Care Preferences Communicated:   Ventilation:   If the patient, in their present state of health, suddenly became very ill and unable to breathe on their own,     the patient would desire the use of a ventilator (breathing machine). If their health worsens and it becomes clear that the change of recovery is unlikely,     the patient would desire the use of a ventilator (breathing machine). Resuscitation:  In the event the patient's heart stopped as a result of an underlying serious health condition, the patient communicates a preference for      resuscitative attempts (CPR). Outcomes/Plan:  Medical records was notified of the updated document.   They were sent the new document and a note from the patient authorizing voiding of the document  dated

## 2023-09-05 NOTE — PROGRESS NOTES
Arrived to place midline after chart review. Pre-procedure and timeout done with RN Catia Galvan. Discussed limitations of placement and allergies. Vessels easily collapsible upon assessment. No difficulty accessing R brachial vein. Blood free flowing and non-pulsatile. Guidewire, introducer, and catheter all easily inserted. OK to use midline. Patient tolerated sterile procedure well. Bed left in low position with belongings and call light within reach. Educated on line care. Handoff to bedside RN. Please call with any questions or concerns. The  will direct you to the PICC RN that is on call.       [phone]

## 2023-09-05 NOTE — PROGRESS NOTES
Pt bleeding at midline site, dsg. Changed and stat seal applied. Dsg saturated again 10 minutes later, applied new dressing, stat seal and pressure applied for approximately 20 minutes and no apparent bleeding was noted. No signs of further bleeding when assessed 10 minutes later.

## 2023-09-05 NOTE — PLAN OF CARE
Problem: Discharge Planning  Goal: Discharge to home or other facility with appropriate resources  Outcome: Progressing     Problem: Pain  Goal: Verbalizes/displays adequate comfort level or baseline comfort level  Outcome: Progressing     Problem: Safety - Adult  Goal: Free from fall injury  Outcome: Progressing     Problem: Nutrition Deficit:  Goal: Optimize nutritional status  Outcome: Progressing       Patient's EF (Ejection Fraction) is greater than 40%    Heart Failure Medications:  Diuretics[de-identified] None    (One of the following REQUIRED for EF </= 40%/SYSTOLIC FAILURE but MAY be used in EF% >40%/DIASTOLIC FAILURE)        ACE[de-identified] None        ARB[de-identified] None         ARNI[de-identified] None    (Beta Blockers)  NON- Evidenced Based Beta Blocker (for EF% >40%/DIASTOLIC FAILURE): None    Evidenced Based Beta Blocker::(REQUIRED for EF% <40%/SYSTOLIC FAILURE) None  . .................................................................................................................................................. Healthy Weight Tracking - BMI + Meds 4/17/2023 5/10/2023 8/10/2023 9/2/2023 9/3/2023 9/4/2023 9/5/2023   Weight 117 lb 119 lb 120 lb 119 lb 120 lb 2.4 oz 118 lb 9.6 oz 123 lb 11.2 oz   Height 5' 3\" 5' 3\" 5' 3\" - 5' 3\" - -   Body Mass Index 20.72 kg/m2 21.08 kg/m2 21.26 kg/m2 - 21.28 kg/m2 21.01 kg/m2 21.91 kg/m2   Some recent data might be hidden         Patient's weights and intake/output reviewed: Yes    Daily Weight log at bedside, patient/family participation in use of log: \"yes    Patient's Last Weight: 123 lbs obtained by standing scale. Difference of 5 lbs more than last documented weight.       Intake/Output Summary (Last 24 hours) at 9/5/2023 0603  Last data filed at 9/4/2023 2240  Gross per 24 hour   Intake 720 ml   Output --   Net 720 ml       Education Booklet Provided: yes    Comorbidities Reviewed Yes    Patient has a past medical history of Abnormal brain MRI, Acute bilateral low back pain without sciatica, SHARRON

## 2023-09-06 LAB
ALBUMIN SERPL-MCNC: 2.4 G/DL (ref 3.4–5)
ANION GAP SERPL CALCULATED.3IONS-SCNC: 12 MMOL/L (ref 3–16)
BUN SERPL-MCNC: 18 MG/DL (ref 7–20)
CALCIUM SERPL-MCNC: 8.8 MG/DL (ref 8.3–10.6)
CHLORIDE SERPL-SCNC: 109 MMOL/L (ref 99–110)
CO2 SERPL-SCNC: 16 MMOL/L (ref 21–32)
CREAT SERPL-MCNC: 1.2 MG/DL (ref 0.6–1.2)
GFR SERPLBLD CREATININE-BSD FMLA CKD-EPI: 50 ML/MIN/{1.73_M2}
GLUCOSE BLD-MCNC: 134 MG/DL (ref 70–99)
GLUCOSE BLD-MCNC: 258 MG/DL (ref 70–99)
GLUCOSE BLD-MCNC: 286 MG/DL (ref 70–99)
GLUCOSE BLD-MCNC: 59 MG/DL (ref 70–99)
GLUCOSE BLD-MCNC: 72 MG/DL (ref 70–99)
GLUCOSE SERPL-MCNC: 65 MG/DL (ref 70–99)
NT-PROBNP SERPL-MCNC: 448 PG/ML (ref 0–124)
PERFORMED ON: ABNORMAL
PERFORMED ON: NORMAL
PHOSPHATE SERPL-MCNC: 4 MG/DL (ref 2.5–4.9)
POTASSIUM SERPL-SCNC: 5.4 MMOL/L (ref 3.5–5.1)
RV AG STL QL IA: NEGATIVE
SODIUM SERPL-SCNC: 137 MMOL/L (ref 136–145)

## 2023-09-06 PROCEDURE — 36415 COLL VENOUS BLD VENIPUNCTURE: CPT

## 2023-09-06 PROCEDURE — 2580000003 HC RX 258: Performed by: NURSE PRACTITIONER

## 2023-09-06 PROCEDURE — 83880 ASSAY OF NATRIURETIC PEPTIDE: CPT

## 2023-09-06 PROCEDURE — 6370000000 HC RX 637 (ALT 250 FOR IP): Performed by: NURSE PRACTITIONER

## 2023-09-06 PROCEDURE — 02HV33Z INSERTION OF INFUSION DEVICE INTO SUPERIOR VENA CAVA, PERCUTANEOUS APPROACH: ICD-10-PCS | Performed by: HOSPITALIST

## 2023-09-06 PROCEDURE — 2580000003 HC RX 258: Performed by: INTERNAL MEDICINE

## 2023-09-06 PROCEDURE — 6360000002 HC RX W HCPCS: Performed by: HOSPITALIST

## 2023-09-06 PROCEDURE — 80048 BASIC METABOLIC PNL TOTAL CA: CPT

## 2023-09-06 PROCEDURE — 82040 ASSAY OF SERUM ALBUMIN: CPT

## 2023-09-06 PROCEDURE — 6370000000 HC RX 637 (ALT 250 FOR IP): Performed by: HOSPITALIST

## 2023-09-06 PROCEDURE — 84100 ASSAY OF PHOSPHORUS: CPT

## 2023-09-06 PROCEDURE — 6370000000 HC RX 637 (ALT 250 FOR IP): Performed by: INTERNAL MEDICINE

## 2023-09-06 PROCEDURE — 1200000000 HC SEMI PRIVATE

## 2023-09-06 RX ADMIN — OXYCODONE HYDROCHLORIDE AND ACETAMINOPHEN 1 TABLET: 7.5; 325 TABLET ORAL at 03:06

## 2023-09-06 RX ADMIN — ASPIRIN 81 MG: 81 TABLET, COATED ORAL at 09:18

## 2023-09-06 RX ADMIN — SODIUM BICARBONATE 1300 MG: 650 TABLET ORAL at 20:55

## 2023-09-06 RX ADMIN — SODIUM CHLORIDE, PRESERVATIVE FREE 10 ML: 5 INJECTION INTRAVENOUS at 20:56

## 2023-09-06 RX ADMIN — PANTOPRAZOLE SODIUM 40 MG: 40 TABLET, DELAYED RELEASE ORAL at 09:18

## 2023-09-06 RX ADMIN — INSULIN LISPRO 4 UNITS: 100 INJECTION, SOLUTION INTRAVENOUS; SUBCUTANEOUS at 17:46

## 2023-09-06 RX ADMIN — OXYCODONE HYDROCHLORIDE AND ACETAMINOPHEN 1 TABLET: 7.5; 325 TABLET ORAL at 15:26

## 2023-09-06 RX ADMIN — SODIUM CHLORIDE, SODIUM GLUCONATE, SODIUM ACETATE, POTASSIUM CHLORIDE AND MAGNESIUM CHLORIDE 100 ML/HR: 526; 502; 368; 37; 30 INJECTION, SOLUTION INTRAVENOUS at 03:07

## 2023-09-06 RX ADMIN — SODIUM BICARBONATE 1300 MG: 650 TABLET ORAL at 09:18

## 2023-09-06 RX ADMIN — PALIPERIDONE 9 MG: 3 TABLET, EXTENDED RELEASE ORAL at 09:18

## 2023-09-06 RX ADMIN — GABAPENTIN 200 MG: 100 CAPSULE ORAL at 09:18

## 2023-09-06 RX ADMIN — TRAZODONE HYDROCHLORIDE 200 MG: 50 TABLET ORAL at 20:55

## 2023-09-06 RX ADMIN — PRAZOSIN HYDROCHLORIDE 2 MG: 1 CAPSULE ORAL at 20:55

## 2023-09-06 RX ADMIN — SODIUM CHLORIDE, SODIUM GLUCONATE, SODIUM ACETATE, POTASSIUM CHLORIDE AND MAGNESIUM CHLORIDE 100 ML/HR: 526; 502; 368; 37; 30 INJECTION, SOLUTION INTRAVENOUS at 16:45

## 2023-09-06 RX ADMIN — Medication 125 MG: at 05:41

## 2023-09-06 RX ADMIN — ENOXAPARIN SODIUM 40 MG: 100 INJECTION SUBCUTANEOUS at 09:18

## 2023-09-06 RX ADMIN — TICAGRELOR 90 MG: 90 TABLET ORAL at 09:18

## 2023-09-06 RX ADMIN — INSULIN GLARGINE 12 UNITS: 100 INJECTION, SOLUTION SUBCUTANEOUS at 20:55

## 2023-09-06 RX ADMIN — ATORVASTATIN CALCIUM 20 MG: 10 TABLET, FILM COATED ORAL at 09:18

## 2023-09-06 RX ADMIN — GABAPENTIN 200 MG: 100 CAPSULE ORAL at 20:55

## 2023-09-06 RX ADMIN — Medication 125 MG: at 17:46

## 2023-09-06 RX ADMIN — OXYCODONE HYDROCHLORIDE AND ACETAMINOPHEN 1 TABLET: 7.5; 325 TABLET ORAL at 22:46

## 2023-09-06 RX ADMIN — TICAGRELOR 90 MG: 90 TABLET ORAL at 20:55

## 2023-09-06 RX ADMIN — Medication 125 MG: at 12:49

## 2023-09-06 RX ADMIN — GABAPENTIN 200 MG: 100 CAPSULE ORAL at 15:26

## 2023-09-06 ASSESSMENT — PAIN SCALES - GENERAL
PAINLEVEL_OUTOF10: 8
PAINLEVEL_OUTOF10: 8
PAINLEVEL_OUTOF10: 0
PAINLEVEL_OUTOF10: 5
PAINLEVEL_OUTOF10: 7

## 2023-09-06 ASSESSMENT — PAIN DESCRIPTION - LOCATION: LOCATION: BACK

## 2023-09-06 NOTE — PROGRESS NOTES
Plasmalyte stopped per Nephrologist Dr. Toro Bess note and secondary confirmation from [facility].

## 2023-09-06 NOTE — PLAN OF CARE
Patient's EF (Ejection Fraction) is greater than 40%    Heart Failure Medications:  Diuretics[de-identified] None    (One of the following REQUIRED for EF </= 40%/SYSTOLIC FAILURE but MAY be used in EF% >40%/DIASTOLIC FAILURE)        ACE[de-identified] None        ARB[de-identified] None         ARNI[de-identified] None    (Beta Blockers)  NON- Evidenced Based Beta Blocker (for EF% >40%/DIASTOLIC FAILURE): None    Evidenced Based Beta Blocker::(REQUIRED for EF% <40%/SYSTOLIC FAILURE) None  . .................................................................................................................................................. Healthy Weight Tracking - BMI + Meds 5/10/2023 8/10/2023 9/2/2023 9/3/2023 9/4/2023 9/5/2023 9/6/2023   Weight 119 lb 120 lb 119 lb 120 lb 2.4 oz 118 lb 9.6 oz 123 lb 11.2 oz 123 lb 11.2 oz   Height 5' 3\" 5' 3\" - 5' 3\" - - -   Body Mass Index 21.08 kg/m2 21.26 kg/m2 - 21.28 kg/m2 21.01 kg/m2 21.91 kg/m2 21.91 kg/m2   Some recent data might be hidden         Patient's weights and intake/output reviewed: Yes    Daily Weight log at bedside, patient/family participation in use of log: \"yes    Patient's Last Weight: 123 lbs obtained by standing scale. Difference of 0 lbs  equal to  last documented weight.       Intake/Output Summary (Last 24 hours) at 9/6/2023 8051  Last data filed at 9/6/2023 5387  Gross per 24 hour   Intake 720 ml   Output 0 ml   Net 720 ml       Education Booklet Provided: yes    Comorbidities Reviewed Yes    Patient has a past medical history of Abnormal brain MRI, Acute bilateral low back pain without sciatica, SHARRON (acute kidney injury) ([address]), Arthritis, Bipolar disorder ([address]), CAD (coronary artery disease), Cancer ([address]), Carotid stenosis, bilateral:<50%:per [place] 7/2016, Carpal tunnel syndrome, Cervical cancer screening, Coronary artery disease of native artery of native heart with stable angina pectoris ([address]), DDD (degenerative disc disease), lumbar, Depression, Depression/anxiety, Depression/anxiety, Diabetes

## 2023-09-06 NOTE — CARE COORDINATION
Chart reviewed day 4. Care provided by nephro and IM. Pt lives at home alone and is IPTA. K 5.2 today, BG 59 this morning, treated and improved. Will continue to follow course for needs.  Kenia Ulrich RN

## 2023-09-06 NOTE — PLAN OF CARE
Problem: Discharge Planning  Goal: Discharge to home or other facility with appropriate resources  Outcome: Progressing     Problem: Pain  Goal: Verbalizes/displays adequate comfort level or baseline comfort level  Outcome: Progressing     Problem: Safety - Adult  Goal: Free from fall injury  Outcome: Progressing     Problem: Metabolic/Fluid and Electrolytes - Adult  Goal: Electrolytes maintained within normal limits  Outcome: Progressing  Goal: Hemodynamic stability and optimal renal function maintained  Outcome: Progressing  Goal: Glucose maintained within prescribed range  Outcome: Progressing     Problem: Nutrition Deficit:  Goal: Optimize nutritional status  Outcome: Progressing     Problem: Cardiovascular - Adult  Goal: Maintains optimal cardiac output and hemodynamic stability  Outcome: Progressing  Goal: Absence of cardiac dysrhythmias or at baseline  Outcome: Progressing

## 2023-09-06 NOTE — PLAN OF CARE
Patient's EF (Ejection Fraction) is greater than 40%    Heart Failure Medications:  Diuretics[de-identified] None    (One of the following REQUIRED for EF </= 40%/SYSTOLIC FAILURE but MAY be used in EF% >40%/DIASTOLIC FAILURE)        ACE[de-identified] None        ARB[de-identified] None         ARNI[de-identified] None    (Beta Blockers)  NON- Evidenced Based Beta Blocker (for EF% >40%/DIASTOLIC FAILURE): None    Evidenced Based Beta Blocker::(REQUIRED for EF% <40%/SYSTOLIC FAILURE) None  . .................................................................................................................................................. Healthy Weight Tracking - BMI + Meds 5/10/2023 8/10/2023 9/2/2023 9/3/2023 9/4/2023 9/5/2023 9/6/2023   Weight 119 lb 120 lb 119 lb 120 lb 2.4 oz 118 lb 9.6 oz 123 lb 11.2 oz 123 lb 11.2 oz   Height 5' 3\" 5' 3\" - 5' 3\" - - -   Body Mass Index 21.08 kg/m2 21.26 kg/m2 - 21.28 kg/m2 21.01 kg/m2 21.91 kg/m2 21.91 kg/m2   Some recent data might be hidden         Patient's weights and intake/output reviewed: Yes    Daily Weight log at bedside, patient/family participation in use of log: \"yes    Patient's Last Weight: 123 lbs obtained by standing scale. Difference of 0 lbs  than last documented weight.       Intake/Output Summary (Last 24 hours) at 9/6/2023 0557  Last data filed at 9/6/2023 0542  Gross per 24 hour   Intake 720 ml   Output --   Net 720 ml       Education Booklet Provided: yes    Comorbidities Reviewed Yes    Patient has a past medical history of Abnormal brain MRI, Acute bilateral low back pain without sciatica, SHARRON (acute kidney injury) ([address]), Arthritis, Bipolar disorder ([address]), CAD (coronary artery disease), Cancer ([address]), Carotid stenosis, bilateral:<50%:per [place] 7/2016, Carpal tunnel syndrome, Cervical cancer screening, Coronary artery disease of native artery of native heart with stable angina pectoris ([address]), DDD (degenerative disc disease), lumbar, Depression, Depression/anxiety, Depression/anxiety, Diabetes mellitus

## 2023-09-07 VITALS
RESPIRATION RATE: 16 BRPM | BODY MASS INDEX: 22.25 KG/M2 | HEART RATE: 52 BPM | TEMPERATURE: 98.4 F | DIASTOLIC BLOOD PRESSURE: 47 MMHG | OXYGEN SATURATION: 98 % | SYSTOLIC BLOOD PRESSURE: 145 MMHG | HEIGHT: 63 IN | WEIGHT: 125.6 LBS

## 2023-09-07 LAB
ANION GAP SERPL CALCULATED.3IONS-SCNC: 7 MMOL/L (ref 3–16)
BUN SERPL-MCNC: 18 MG/DL (ref 7–20)
CALCIUM SERPL-MCNC: 8.7 MG/DL (ref 8.3–10.6)
CHLORIDE SERPL-SCNC: 105 MMOL/L (ref 99–110)
CO2 SERPL-SCNC: 25 MMOL/L (ref 21–32)
CREAT SERPL-MCNC: 1.3 MG/DL (ref 0.6–1.2)
GFR SERPLBLD CREATININE-BSD FMLA CKD-EPI: 46 ML/MIN/{1.73_M2}
GLUCOSE BLD-MCNC: 183 MG/DL (ref 70–99)
GLUCOSE BLD-MCNC: 279 MG/DL (ref 70–99)
GLUCOSE SERPL-MCNC: 253 MG/DL (ref 70–99)
PERFORMED ON: ABNORMAL
PERFORMED ON: ABNORMAL
POTASSIUM SERPL-SCNC: 5 MMOL/L (ref 3.5–5.1)
SODIUM SERPL-SCNC: 137 MMOL/L (ref 136–145)

## 2023-09-07 PROCEDURE — 97166 OT EVAL MOD COMPLEX 45 MIN: CPT

## 2023-09-07 PROCEDURE — 6370000000 HC RX 637 (ALT 250 FOR IP): Performed by: HOSPITALIST

## 2023-09-07 PROCEDURE — 97162 PT EVAL MOD COMPLEX 30 MIN: CPT

## 2023-09-07 PROCEDURE — 2580000003 HC RX 258: Performed by: NURSE PRACTITIONER

## 2023-09-07 PROCEDURE — 36415 COLL VENOUS BLD VENIPUNCTURE: CPT

## 2023-09-07 PROCEDURE — 6360000002 HC RX W HCPCS: Performed by: HOSPITALIST

## 2023-09-07 PROCEDURE — 97530 THERAPEUTIC ACTIVITIES: CPT

## 2023-09-07 PROCEDURE — 97535 SELF CARE MNGMENT TRAINING: CPT

## 2023-09-07 PROCEDURE — 97110 THERAPEUTIC EXERCISES: CPT

## 2023-09-07 PROCEDURE — 80048 BASIC METABOLIC PNL TOTAL CA: CPT

## 2023-09-07 PROCEDURE — 6370000000 HC RX 637 (ALT 250 FOR IP): Performed by: NURSE PRACTITIONER

## 2023-09-07 PROCEDURE — 6370000000 HC RX 637 (ALT 250 FOR IP): Performed by: INTERNAL MEDICINE

## 2023-09-07 RX ORDER — SODIUM BICARBONATE 650 MG/1
1300 TABLET ORAL 2 TIMES DAILY
Qty: 120 TABLET | Refills: 0 | Status: SHIPPED | OUTPATIENT
Start: 2023-09-07 | End: 2023-10-07

## 2023-09-07 RX ORDER — VANCOMYCIN HYDROCHLORIDE 125 MG/1
125 CAPSULE ORAL 4 TIMES DAILY
Qty: 40 CAPSULE | Refills: 0 | Status: SHIPPED | OUTPATIENT
Start: 2023-09-07 | End: 2023-09-17

## 2023-09-07 RX ADMIN — PALIPERIDONE 9 MG: 3 TABLET, EXTENDED RELEASE ORAL at 09:12

## 2023-09-07 RX ADMIN — Medication 125 MG: at 00:15

## 2023-09-07 RX ADMIN — ATORVASTATIN CALCIUM 20 MG: 10 TABLET, FILM COATED ORAL at 09:10

## 2023-09-07 RX ADMIN — GABAPENTIN 200 MG: 100 CAPSULE ORAL at 13:59

## 2023-09-07 RX ADMIN — ENOXAPARIN SODIUM 40 MG: 100 INJECTION SUBCUTANEOUS at 09:10

## 2023-09-07 RX ADMIN — INSULIN LISPRO 4 UNITS: 100 INJECTION, SOLUTION INTRAVENOUS; SUBCUTANEOUS at 12:48

## 2023-09-07 RX ADMIN — GABAPENTIN 200 MG: 100 CAPSULE ORAL at 09:10

## 2023-09-07 RX ADMIN — TICAGRELOR 90 MG: 90 TABLET ORAL at 09:10

## 2023-09-07 RX ADMIN — SODIUM BICARBONATE 1300 MG: 650 TABLET ORAL at 09:10

## 2023-09-07 RX ADMIN — Medication 125 MG: at 06:30

## 2023-09-07 RX ADMIN — Medication 125 MG: at 12:48

## 2023-09-07 RX ADMIN — OXYCODONE HYDROCHLORIDE AND ACETAMINOPHEN 1 TABLET: 7.5; 325 TABLET ORAL at 09:10

## 2023-09-07 RX ADMIN — ASPIRIN 81 MG: 81 TABLET, COATED ORAL at 09:10

## 2023-09-07 RX ADMIN — PANTOPRAZOLE SODIUM 40 MG: 40 TABLET, DELAYED RELEASE ORAL at 09:10

## 2023-09-07 RX ADMIN — SODIUM CHLORIDE, PRESERVATIVE FREE 10 ML: 5 INJECTION INTRAVENOUS at 09:10

## 2023-09-07 ASSESSMENT — PAIN SCALES - GENERAL
PAINLEVEL_OUTOF10: 7
PAINLEVEL_OUTOF10: 3

## 2023-09-07 ASSESSMENT — PAIN DESCRIPTION - LOCATION: LOCATION: BACK

## 2023-09-07 NOTE — DISCHARGE SUMMARY
Metrix Blood Glucose Test strip  Generic drug: blood glucose test strips  As needed.   What changed:   how much to take  when to take this  additional instructions            CONTINUE taking these medications      amLODIPine 5 MG tablet  Commonly known as: NORVASC  Take 1 tablet by mouth daily     aspirin 81 MG tablet     atorvastatin 20 MG tablet  Commonly known as: LIPITOR  TAKE 1 TABLET BY MOUTH EVERY DAY     Austedo 9 MG tablet  Generic drug: Deutetrabenazine     calcium carbonate-vitamin D 600-400 MG-UNIT Tabs per tab  Commonly known as: CALTRATE     clonazePAM 0.5 MG tablet  Commonly known as: KLONOPIN     ferrous sulfate 325 (65 Fe) MG tablet  Commonly known as: IRON 325     glimepiride 4 MG tablet  Commonly known as: AMARYL  Take 1 tablet by mouth every morning (before breakfast)     insulin lispro (1 Unit Dial) 100 UNIT/ML Sopn  Commonly known as: HUMALOG/ADMELOG  With meals                   - at bedtime    < 150 ---   5 units            0 units  151-200 -- 6 units            0 units  201-250 :  7 units            1 units  251-300:   8 units            2 units  301-350:   9 units            3 units  >350 :       10 units          4 units  Upto 30 units/day     Insulin Pen Needle 32G X 4 MM Misc  1 each by Does not apply route daily     Lantus SoloStar 100 UNIT/ML injection pen  Generic drug: insulin glargine  Inject 12 units daily     oxyCODONE-acetaminophen 7.5-325 MG per tablet  Commonly known as: PERCOCET     paliperidone 9 MG extended release tablet  Commonly known as: INVEGA     prazosin 2 MG capsule  Commonly known as: MINIPRESS     ticagrelor 90 MG Tabs tablet  Commonly known as: Brilinta  TAKE 1 TABLET BY MOUTH TWICE A DAY     traZODone 100 MG tablet  Commonly known as: DESYREL     True Metrix Meter w/Device Kit  Used to  check blood sugar 3 times eyad            STOP taking these medications      LISINOPRIL PO            ASK your doctor about these medications      pantoprazole 40 MG

## 2023-09-07 NOTE — PROGRESS NOTES
Patient discharged via wheelchair with [employer]. No c/o pain or discomfort. Discharge instructions reviewed with patient. Medications delivered to patient at bedside. No questions at this time.

## 2023-09-07 NOTE — PLAN OF CARE
Problem: Discharge Planning  Goal: Discharge to home or other facility with appropriate resources  4/1/2418 7561 by Dwayne Sorto RN  Outcome: Adequate for Discharge  3/2/5654 2828 by Dwayne Sorto RN  Outcome: Adequate for Discharge     Problem: Pain  Goal: Verbalizes/displays adequate comfort level or baseline comfort level  5/2/9633 3266 by Dwayne Sorto RN  Outcome: Adequate for Discharge  8/1/7146 3540 by Dwayne Sorto RN  Outcome: Adequate for Discharge  Flowsheets (Taken 9/7/2023 0907 by Theodore Vilchis RN)  Verbalizes/displays adequate comfort level or baseline comfort level: Encourage patient to monitor pain and request assistance     Problem: Safety - Adult  Goal: Free from fall injury  3/2/1166 0879 by Dwayne Sorto RN  Outcome: Adequate for Discharge  5/0/7559 8621 by Dwayne Sorto RN  Outcome: Adequate for Discharge     Problem: Metabolic/Fluid and Electrolytes - Adult  Goal: Electrolytes maintained within normal limits  1/5/4338 8416 by Dwayne Sorto RN  Outcome: Adequate for Discharge  9/2/9344 1760 by Dwayne Sorto RN  Outcome: Adequate for Discharge  Goal: Hemodynamic stability and optimal renal function maintained  0/4/5181 3868 by Dwayne Sorto RN  Outcome: Adequate for Discharge  6/7/2036 9304 by Dwayne Sorto RN  Outcome: Adequate for Discharge  Goal: Glucose maintained within prescribed range  8/2/5541 1470 by Dwayne Sorto RN  Outcome: Adequate for Discharge  1/0/0631 1500 by Dwayne Sorto RN  Outcome: Adequate for Discharge     Problem: Nutrition Deficit:  Goal: Optimize nutritional status  9/8/6488 1192 by Dwayne Sorto RN  Outcome: Adequate for Discharge  0/0/6696 5682 by Dwayne Sorto RN  Outcome: Adequate for Discharge     Problem: Cardiovascular - Adult  Goal: Maintains optimal cardiac output and hemodynamic stability  6/0/0148 4569 by Dwayne Sorto RN  Outcome: Adequate for Discharge  6/3/6020 0345 by Dwayne Sorto RN  Outcome: Adequate for Discharge  Goal: Absence of cardiac dysrhythmias or at baseline  4/9/6093 8663 by Flakita Johnson RN  Outcome: Adequate for Discharge  4/6/0332 9430 by Flakita Johnson RN  Outcome: Adequate for Discharge

## 2023-09-07 NOTE — PROGRESS NOTES
Interval History and plan:       Renal function is stable  Potassium is now 5  Holding ARB and would hold for now  We will have to assess outpatient to restart ARB  She might also require SGLT2 inhibitor expected as an outpatient  Okay to discharge from renal perspective  Needs follow-up on the   long-term basis to avoid the need for dialysis in the future explained to her and will set up appointment     Assessment :     CKDII/IIIa with SHARRON  Cr 1.9 on consult  SHARRON likely due to hemodynamic changes due to acute on chronic onset of diarrhea/GI loss  Creatinine qtbfkngm-2-9.3  She is known to have CKD 2 baseline-likely due to nephrectomy, recurrent SHARRON, diabetes  She has a history of Wilms tumor and nephrectomy  Had SHARRON on 2/21-seen by me again. Her protein creatinine ratio was normal  She also has diabetes mellitus on insulin-which is a risk factor for CKD     UA-300 mg protein, glucose 500  CT scan of the abdomen 9/23-absent right kidney and adrenal gland, left kidney and adrenal gland are unremarkable    Hypertension   BP: (143-166)/(69-71)  Pulse:  [66-75]   BP goal inpatient [phone] systolic inpatient  Also has atrial fibrillation  On amlodipine 5 mg once a day and prazosin 2 mg p.o. nightly at home      Anemia     Please call with questions at-  24 hrs Answering service [phone]   7 am-5 pm at Perfect [employer], or cell phone  Dr Romulo Hollingsworth MD    HPI:     Patient is a 58 y.o. female  presented to  the hospital on 9/2/2023 with sudden abdominal pain. Associated with diarrhea and nausea and vomiting. Has a history of colon cancer cancer is status post surgery March 2023.   She has chronic diarrhea since the surgery    Vitals:     Vitals:    09/07/23 0957   BP: (!) 143/69   Pulse: 66   Resp:    Temp:    SpO2: 99%         Physical Examination:     General appearance: Alert, orientated  Respiratory: no distress  Cardiovascular: no raised JVD, Edema none  Abdomen: -  soft  Other relevant findings: pleasant    Meds:      enoxaparin  40 mg SubCUTAneous Daily    sodium bicarbonate  1,300 mg Oral BID    gabapentin  200 mg Oral TID    [Held by provider] losartan  50 mg Oral Daily    vancomycin  125 mg Oral 4 times per day    aspirin  81 mg Oral Daily    atorvastatin  20 mg Oral Daily    insulin glargine  12 Units SubCUTAneous Nightly    paliperidone  9 mg Oral QAM    pantoprazole  40 mg Oral Daily    prazosin  2 mg Oral Nightly    ticagrelor  90 mg Oral BID    traZODone  200 mg Oral Nightly    sodium chloride flush  5-40 mL IntraVENous 2 times per day    insulin lispro  0-8 Units SubCUTAneous TID     insulin lispro  0-4 Units SubCUTAneous Nightly           Thanks,   [facility]  [facility], [address] [place] [address]  Office: [phone]  Fax: [phone]

## 2023-09-07 NOTE — PROGRESS NOTES
Physical Therapy  Facility/Department: [place] [id number] - MED SURG  Physical Therapy Initial Assessment/Treatment     Name: Herminia Benjamin  : 1960  MRN: [id number]  Date of Service: 2023    Discharge Recommendations:  24 hour supervision or assist, Home with Home health PT   PT Equipment Recommendations  Equipment Needed: No  Other: Has cane and RW at home      Patient Diagnosis(es): The primary encounter diagnosis was Enterocolitis. A diagnosis of Acute kidney failure with tubular necrosis [facility]) was also pertinent to this visit. Past Medical History:  has a past medical history of Abnormal brain MRI, Acute bilateral low back pain without sciatica, SHARRON (acute kidney injury) ([address]), Arthritis, Bipolar disorder ([address]), CAD (coronary artery disease), Cancer ([address]), Carotid stenosis, bilateral:<50%:per [place] 2016, Carpal tunnel syndrome, Cervical cancer screening, Coronary artery disease of native artery of native heart with stable angina pectoris ([address]), DDD (degenerative disc disease), lumbar, Depression, Depression/anxiety, Depression/anxiety, Diabetes mellitus ([address]), Gout, History of mammogram, History of therapeutic radiation, Hyperlipidemia, Hypertension, 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 Past Surgical History:  has a past surgical history that includes Kidney removal; Hysterectomy; Breast lumpectomy (); Tubal ligation; other surgical history (Right); Cardiac catheterization (2020); Upper gastrointestinal endoscopy (N/A, 2021); Colonoscopy (N/A, 2021); CT BIOPSY BONE MARROW (2/3/2021); Temporal Artery Biopsy (Right, 2021);  Upper

## 2023-09-07 NOTE — PROGRESS NOTES
Occupational Therapy  Facility/Department: [place] [address] - MED SURG  Occupational Therapy Initial Assessment and Treatment Note     Name: Kalani Sands  : 1960  MRN: [id number]  Date of Service: 2023    Discharge Recommendations:  Home with assist PRN, Home with Home health OT  OT Equipment Recommendations  Other: Recommend RTS for home. Pt reports that her RTS is broken. Pt has difficulty standing from a standard toilet height. Patient Diagnosis(es): The primary encounter diagnosis was Enterocolitis. A diagnosis of Acute kidney failure with tubular necrosis [facility]) was also pertinent to this visit. Past Medical History:  has a past medical history of Abnormal brain MRI, Acute bilateral low back pain without sciatica, SHARRON (acute kidney injury) ([address]), Arthritis, Bipolar disorder ([address]), CAD (coronary artery disease), Cancer ([address]), Carotid stenosis, bilateral:<50%:per [place] 2016, Carpal tunnel syndrome, Cervical cancer screening, Coronary artery disease of native artery of native heart with stable angina pectoris ([address]), DDD (degenerative disc disease), lumbar, Depression, Depression/anxiety, Depression/anxiety, Diabetes mellitus ([address]), Gout, History of mammogram, History of therapeutic radiation, Hyperlipidemia, Hypertension, 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 Past Surgical History:  has a past surgical history that includes Kidney removal; Hysterectomy; Breast lumpectomy (); Tubal ligation; other surgical history (Right); Cardiac catheterization (2020); Upper gastrointestinal endoscopy (N/A, 2021);  Colonoscopy (N/A, 2021); CT BIOPSY management note for discharge disposition. Thank you.   Lucia Baxter OT

## 2023-09-07 NOTE — PLAN OF CARE
Problem: Discharge Planning  Goal: Discharge to home or other facility with appropriate resources  Outcome: Adequate for Discharge     Problem: Pain  Goal: Verbalizes/displays adequate comfort level or baseline comfort level  Outcome: Adequate for Discharge  Flowsheets (Taken 9/7/2023 0907 by Linda Rogers RN)  Verbalizes/displays adequate comfort level or baseline comfort level: Encourage patient to monitor pain and request assistance     Problem: Safety - Adult  Goal: Free from fall injury  Outcome: Adequate for Discharge     Problem: Metabolic/Fluid and Electrolytes - Adult  Goal: Electrolytes maintained within normal limits  Outcome: Adequate for Discharge  Goal: Hemodynamic stability and optimal renal function maintained  Outcome: Adequate for Discharge  Goal: Glucose maintained within prescribed range  Outcome: Adequate for Discharge     Problem: Nutrition Deficit:  Goal: Optimize nutritional status  Outcome: Adequate for Discharge     Problem: Cardiovascular - Adult  Goal: Maintains optimal cardiac output and hemodynamic stability  Outcome: Adequate for Discharge  Goal: Absence of cardiac dysrhythmias or at baseline  Outcome: Adequate for Discharge

## 2023-09-07 NOTE — CARE COORDINATION
CASE MANAGEMENT DISCHARGE SUMMARY      Discharge to: home    Transportation:      Agency used: [employer] up time: 1600      Confirmed discharge plan with:     Patient: yes     Family:  yes per pt     RN, name: Frida Moore RN

## 2023-09-08 ENCOUNTER — FOLLOWUP TELEPHONE ENCOUNTER (OUTPATIENT)
Dept: TELEMETRY | Age: 63
End: 2023-09-08

## 2023-09-08 NOTE — TELEPHONE ENCOUNTER
Care Transitions Initial Follow Up Call    Call within 2 business days of discharge: Yes     Patient: Mica Hogan Patient : 1960 MRN: [id number]    [unfilled]    RARS: Readmission Risk Score: 17.3       Spoke with: patient     Discharge department/facility: home    Non-face-to-face services provided:  Scheduled appointment with PCP-23 with cardiology    Spoke to patient for hospital follow up. Denies any needs. Has not weighted self today. Reviewed parameters to guide fluid.        Follow Up  Future Appointments   Date Time Provider [address] [place] [address]   2023 11:40 AM Kelly Hurtado MD Deerf Endo AGATA Arevalo RN

## 2023-09-21 ENCOUNTER — OFFICE VISIT (OUTPATIENT)
Dept: ENDOCRINOLOGY | Age: 63
End: 2023-09-21
Payer: MEDICAID

## 2023-09-21 VITALS
BODY MASS INDEX: 21.23 KG/M2 | WEIGHT: 119.8 LBS | HEIGHT: 63 IN | SYSTOLIC BLOOD PRESSURE: 172 MMHG | OXYGEN SATURATION: 98 % | DIASTOLIC BLOOD PRESSURE: 79 MMHG | HEART RATE: 78 BPM

## 2023-09-21 DIAGNOSIS — E11.69 DYSLIPIDEMIA ASSOCIATED WITH TYPE 2 DIABETES MELLITUS (HCC): ICD-10-CM

## 2023-09-21 DIAGNOSIS — Z79.4 TYPE 2 DIABETES MELLITUS WITH HYPERGLYCEMIA, WITH LONG-TERM CURRENT USE OF INSULIN (HCC): ICD-10-CM

## 2023-09-21 DIAGNOSIS — E78.5 DYSLIPIDEMIA ASSOCIATED WITH TYPE 2 DIABETES MELLITUS (HCC): ICD-10-CM

## 2023-09-21 DIAGNOSIS — E11.40 DIABETIC NEUROPATHY WITH NEUROLOGIC COMPLICATION (HCC): ICD-10-CM

## 2023-09-21 DIAGNOSIS — E11.49 DIABETIC NEUROPATHY WITH NEUROLOGIC COMPLICATION (HCC): ICD-10-CM

## 2023-09-21 DIAGNOSIS — I10 ESSENTIAL HYPERTENSION: ICD-10-CM

## 2023-09-21 DIAGNOSIS — E11.59 TYPE 2 DIABETES MELLITUS WITH VASCULAR DISEASE (HCC): ICD-10-CM

## 2023-09-21 DIAGNOSIS — Z79.4 TYPE 2 DIABETES MELLITUS WITH HYPERGLYCEMIA, WITH LONG-TERM CURRENT USE OF INSULIN (HCC): Primary | ICD-10-CM

## 2023-09-21 DIAGNOSIS — E11.65 TYPE 2 DIABETES MELLITUS WITH HYPERGLYCEMIA, WITH LONG-TERM CURRENT USE OF INSULIN (HCC): ICD-10-CM

## 2023-09-21 DIAGNOSIS — E11.65 TYPE 2 DIABETES MELLITUS WITH HYPERGLYCEMIA, WITH LONG-TERM CURRENT USE OF INSULIN (HCC): Primary | ICD-10-CM

## 2023-09-21 LAB
CHOLEST SERPL-MCNC: 159 MG/DL (ref 0–199)
HDLC SERPL-MCNC: 75 MG/DL (ref 40–60)
LDLC SERPL CALC-MCNC: 66 MG/DL
TRIGL SERPL-MCNC: 90 MG/DL (ref 0–150)
VLDLC SERPL CALC-MCNC: 18 MG/DL

## 2023-09-21 PROCEDURE — 1036F TOBACCO NON-USER: CPT | Performed by: INTERNAL MEDICINE

## 2023-09-21 PROCEDURE — 3017F COLORECTAL CA SCREEN DOC REV: CPT | Performed by: INTERNAL MEDICINE

## 2023-09-21 PROCEDURE — 99214 OFFICE O/P EST MOD 30 MIN: CPT | Performed by: INTERNAL MEDICINE

## 2023-09-21 PROCEDURE — 3077F SYST BP >= 140 MM HG: CPT | Performed by: INTERNAL MEDICINE

## 2023-09-21 PROCEDURE — G8420 CALC BMI NORM PARAMETERS: HCPCS | Performed by: INTERNAL MEDICINE

## 2023-09-21 PROCEDURE — 3052F HG A1C>EQUAL 8.0%<EQUAL 9.0%: CPT | Performed by: INTERNAL MEDICINE

## 2023-09-21 PROCEDURE — 2022F DILAT RTA XM EVC RTNOPTHY: CPT | Performed by: INTERNAL MEDICINE

## 2023-09-21 PROCEDURE — 1111F DSCHRG MED/CURRENT MED MERGE: CPT | Performed by: INTERNAL MEDICINE

## 2023-09-21 PROCEDURE — 3079F DIAST BP 80-89 MM HG: CPT | Performed by: INTERNAL MEDICINE

## 2023-09-21 PROCEDURE — G8427 DOCREV CUR MEDS BY ELIG CLIN: HCPCS | Performed by: INTERNAL MEDICINE

## 2023-09-21 RX ORDER — CLOPIDOGREL BISULFATE 75 MG/1
75 TABLET ORAL DAILY
COMMUNITY
Start: 2023-08-18

## 2023-09-21 RX ORDER — PIOGLITAZONEHYDROCHLORIDE 30 MG/1
TABLET ORAL
COMMUNITY
Start: 2023-08-25

## 2023-09-21 NOTE — PROGRESS NOTES
Patient ID:   Lois Roman is a 58 y.o. female  Chief Complaint:   Lois Roman presents for an evaluation of Type 2 Diabetes Mellitus , Hyperlipidemia and hypertension. Subjective:   Type 2 Diabetes Mellitus diagnosed around 2010  On insulin since 2012  Previous regimen:Taking Admelog 15 units tidac and 5 units for snacks. Basaglar 40 units once a day at night. VGO stopped due to hypoglycemias     Multiple hospitalizations in 2021 due to weakness, anemia   She is recently released from a nursing home     Not taking:   Synjardy 12.5-1000mg, two tabs in am. Last pick ups: Aug 2020 and Feb 2021. Admits making poor dietary choices, trying to cut down fried. colon cancer , sp surgery - right colectomy March 2023. No chemo needed as per the patient. Currently on   Getting more tests for colon cancer (Imaging and labs)    Lantus 12 units daily in am. Denies missing it now. Previously she was missing it 4-5 days a week    Victoza 1.8 mg daily in AM  Glimepiride 4 mg with breakfast . She is not sure about the medicine     Humalog or Novolog SS                      With meals (during the day)   - at bedtime    < 150 ---     5 units                                 0 units  151-200 --  6 units                                 0 units  201-250 :    7 units                                 1 units  251-300:     8 units                                 2 units  301-350:     9 units                                 3 units  >350 :         10 units                               4 units    Multiple cancellations and no shows   She has work on keeping the appointments so insulin regimen can be adjusted     She thinks she is eating too much bread, pasta     Using [place] 2   Did not bring meter or readings     Checks blood sugars 2-3 times per day. Reportedly 199-400 . AM:   Lunch:   Supper:    HS:        Hypoglycemias: Once in last 4 weeks      Meals: 3, dinner is big.  Snacks (jellos, fruits, pretzels) after

## 2023-09-22 LAB
EST. AVERAGE GLUCOSE BLD GHB EST-MCNC: 205.9 MG/DL
HBA1C MFR BLD: 8.8 %

## 2023-09-23 DIAGNOSIS — E11.65 TYPE 2 DIABETES MELLITUS WITH HYPERGLYCEMIA, WITH LONG-TERM CURRENT USE OF INSULIN (HCC): ICD-10-CM

## 2023-09-23 DIAGNOSIS — Z79.4 TYPE 2 DIABETES MELLITUS WITH HYPERGLYCEMIA, WITH LONG-TERM CURRENT USE OF INSULIN (HCC): ICD-10-CM

## 2023-09-25 RX ORDER — INSULIN GLARGINE 100 [IU]/ML
INJECTION, SOLUTION SUBCUTANEOUS
Qty: 15 ADJUSTABLE DOSE PRE-FILLED PEN SYRINGE | Refills: 2 | Status: SHIPPED | OUTPATIENT
Start: 2023-09-25

## 2023-09-25 NOTE — TELEPHONE ENCOUNTER
Requested Refill:   Requested Prescriptions     Pending Prescriptions Disp Refills    LANTUS SOLOSTAR 100 UNIT/ML injection pen [Pharmacy Med Name: Naomi Harvey 100 UNIT/ML]       Sig: INJECT 12 UNITS DAILY       Last refilled: 8/10/2023 # 15 ml no refills     Last Appt: 9/21/2023  Next Appt: 11/2/2023

## 2023-10-21 DIAGNOSIS — E11.65 TYPE 2 DIABETES MELLITUS WITH HYPERGLYCEMIA, WITH LONG-TERM CURRENT USE OF INSULIN (HCC): ICD-10-CM

## 2023-10-21 DIAGNOSIS — Z79.4 TYPE 2 DIABETES MELLITUS WITH HYPERGLYCEMIA, WITH LONG-TERM CURRENT USE OF INSULIN (HCC): ICD-10-CM

## 2023-10-23 RX ORDER — INSULIN GLARGINE 100 [IU]/ML
INJECTION, SOLUTION SUBCUTANEOUS
Qty: 15 ML | Refills: 0 | Status: SHIPPED | OUTPATIENT
Start: 2023-10-23

## 2023-10-23 NOTE — TELEPHONE ENCOUNTER
Medication:   Requested Prescriptions     Pending Prescriptions Disp Refills    insulin glargine (LANTUS SOLOSTAR) 100 UNIT/ML injection pen [Pharmacy Med Name: Talya Eneas 100 UNIT/ML] 15 mL 0     Sig: INJECT 18 UNITS DAILY       Last Filled:  9/25/23    Patient Phone Number: [phone] (home)     Last appt: 9/21/2023   Next appt: 11/2/2023    Last Labs DM:   Lab Results   Component Value Date/Time    LABA1C 8.8 09/21/2023 12:41 PM

## 2023-11-02 ENCOUNTER — OFFICE VISIT (OUTPATIENT)
Dept: ENDOCRINOLOGY | Age: 63
End: 2023-11-02

## 2023-11-02 ENCOUNTER — APPOINTMENT (OUTPATIENT)
Dept: CT IMAGING | Age: 63
DRG: 199 | End: 2023-11-02
Payer: MEDICAID

## 2023-11-02 ENCOUNTER — HOSPITAL ENCOUNTER (INPATIENT)
Age: 63
LOS: 1 days | Discharge: HOME | DRG: 199 | End: 2023-11-07
Attending: EMERGENCY MEDICINE | Admitting: INTERNAL MEDICINE
Payer: MEDICAID

## 2023-11-02 VITALS
HEIGHT: 63 IN | OXYGEN SATURATION: 100 % | WEIGHT: 128.4 LBS | BODY MASS INDEX: 22.75 KG/M2 | HEART RATE: 70 BPM | SYSTOLIC BLOOD PRESSURE: 175 MMHG | DIASTOLIC BLOOD PRESSURE: 76 MMHG

## 2023-11-02 DIAGNOSIS — E11.69 DYSLIPIDEMIA ASSOCIATED WITH TYPE 2 DIABETES MELLITUS (HCC): ICD-10-CM

## 2023-11-02 DIAGNOSIS — I10 ESSENTIAL HYPERTENSION: ICD-10-CM

## 2023-11-02 DIAGNOSIS — R73.9 HYPERGLYCEMIA: ICD-10-CM

## 2023-11-02 DIAGNOSIS — N17.9 ACUTE KIDNEY INJURY SUPERIMPOSED ON CKD (HCC): ICD-10-CM

## 2023-11-02 DIAGNOSIS — E11.59 TYPE 2 DIABETES MELLITUS WITH VASCULAR DISEASE (HCC): ICD-10-CM

## 2023-11-02 DIAGNOSIS — R74.8 ELEVATED LIVER ENZYMES: ICD-10-CM

## 2023-11-02 DIAGNOSIS — N18.9 ACUTE KIDNEY INJURY SUPERIMPOSED ON CKD (HCC): ICD-10-CM

## 2023-11-02 DIAGNOSIS — E11.40 DIABETIC NEUROPATHY WITH NEUROLOGIC COMPLICATION (HCC): ICD-10-CM

## 2023-11-02 DIAGNOSIS — R51.9 NONINTRACTABLE HEADACHE, UNSPECIFIED CHRONICITY PATTERN, UNSPECIFIED HEADACHE TYPE: ICD-10-CM

## 2023-11-02 DIAGNOSIS — I10 UNCONTROLLED HYPERTENSION: ICD-10-CM

## 2023-11-02 DIAGNOSIS — E11.49 DIABETIC NEUROPATHY WITH NEUROLOGIC COMPLICATION (HCC): ICD-10-CM

## 2023-11-02 DIAGNOSIS — Z79.4 TYPE 2 DIABETES MELLITUS WITH HYPERGLYCEMIA, WITH LONG-TERM CURRENT USE OF INSULIN (HCC): Primary | ICD-10-CM

## 2023-11-02 DIAGNOSIS — E78.5 DYSLIPIDEMIA ASSOCIATED WITH TYPE 2 DIABETES MELLITUS (HCC): ICD-10-CM

## 2023-11-02 DIAGNOSIS — I16.0 HYPERTENSIVE URGENCY: Primary | ICD-10-CM

## 2023-11-02 DIAGNOSIS — E11.65 TYPE 2 DIABETES MELLITUS WITH HYPERGLYCEMIA, WITH LONG-TERM CURRENT USE OF INSULIN (HCC): Primary | ICD-10-CM

## 2023-11-02 LAB
ALBUMIN SERPL-MCNC: 2.9 G/DL (ref 3.4–5)
ALBUMIN/GLOB SERPL: 0.7 {RATIO} (ref 1.1–2.2)
ALP SERPL-CCNC: 357 U/L (ref 40–129)
ALT SERPL-CCNC: 63 U/L (ref 10–40)
ANION GAP SERPL CALCULATED.3IONS-SCNC: 11 MMOL/L (ref 3–16)
AST SERPL-CCNC: 54 U/L (ref 15–37)
BASOPHILS # BLD: 0 K/UL (ref 0–0.2)
BASOPHILS NFR BLD: 0.4 %
BILIRUB SERPL-MCNC: <0.2 MG/DL (ref 0–1)
BUN SERPL-MCNC: 24 MG/DL (ref 7–20)
CALCIUM SERPL-MCNC: 8.3 MG/DL (ref 8.3–10.6)
CHLORIDE SERPL-SCNC: 103 MMOL/L (ref 99–110)
CO2 SERPL-SCNC: 20 MMOL/L (ref 21–32)
CREAT SERPL-MCNC: 1.6 MG/DL (ref 0.6–1.2)
DEPRECATED RDW RBC AUTO: 13.8 % (ref 12.4–15.4)
EKG ATRIAL RATE: 59 BPM
EKG DIAGNOSIS: NORMAL
EKG P AXIS: 8 DEGREES
EKG P-R INTERVAL: 158 MS
EKG Q-T INTERVAL: 438 MS
EKG QRS DURATION: 76 MS
EKG QTC CALCULATION (BAZETT): 433 MS
EKG R AXIS: -14 DEGREES
EKG T AXIS: 24 DEGREES
EKG VENTRICULAR RATE: 59 BPM
EOSINOPHIL # BLD: 0.1 K/UL (ref 0–0.6)
EOSINOPHIL NFR BLD: 1.4 %
FLUAV RNA RESP QL NAA+PROBE: NOT DETECTED
FLUBV RNA RESP QL NAA+PROBE: NOT DETECTED
GFR SERPLBLD CREATININE-BSD FMLA CKD-EPI: 36 ML/MIN/{1.73_M2}
GLUCOSE SERPL-MCNC: 455 MG/DL (ref 70–99)
HCT VFR BLD AUTO: 28.1 % (ref 36–48)
HGB BLD-MCNC: 8.6 G/DL (ref 12–16)
LYMPHOCYTES # BLD: 1.7 K/UL (ref 1–5.1)
LYMPHOCYTES NFR BLD: 33.1 %
MCH RBC QN AUTO: 26.6 PG (ref 26–34)
MCHC RBC AUTO-ENTMCNC: 30.7 G/DL (ref 31–36)
MCV RBC AUTO: 86.7 FL (ref 80–100)
MONOCYTES # BLD: 0.4 K/UL (ref 0–1.3)
MONOCYTES NFR BLD: 7.5 %
NEUTROPHILS # BLD: 3 K/UL (ref 1.7–7.7)
NEUTROPHILS NFR BLD: 57.6 %
PLATELET # BLD AUTO: 165 K/UL (ref 135–450)
PMV BLD AUTO: 9 FL (ref 5–10.5)
POTASSIUM SERPL-SCNC: 4.8 MMOL/L (ref 3.5–5.1)
PROT SERPL-MCNC: 7.3 G/DL (ref 6.4–8.2)
RBC # BLD AUTO: 3.24 M/UL (ref 4–5.2)
SARS-COV-2 RNA RESP QL NAA+PROBE: NOT DETECTED
SODIUM SERPL-SCNC: 134 MMOL/L (ref 136–145)
WBC # BLD AUTO: 5.2 K/UL (ref 4–11)

## 2023-11-02 PROCEDURE — 93005 ELECTROCARDIOGRAM TRACING: CPT | Performed by: EMERGENCY MEDICINE

## 2023-11-02 PROCEDURE — 85025 COMPLETE CBC W/AUTO DIFF WBC: CPT

## 2023-11-02 PROCEDURE — 93010 ELECTROCARDIOGRAM REPORT: CPT | Performed by: INTERNAL MEDICINE

## 2023-11-02 PROCEDURE — 70450 CT HEAD/BRAIN W/O DYE: CPT

## 2023-11-02 PROCEDURE — 80053 COMPREHEN METABOLIC PANEL: CPT

## 2023-11-02 PROCEDURE — 96372 THER/PROPH/DIAG INJ SC/IM: CPT

## 2023-11-02 PROCEDURE — 99223 1ST HOSP IP/OBS HIGH 75: CPT | Performed by: HOSPITALIST

## 2023-11-02 PROCEDURE — 87636 SARSCOV2 & INF A&B AMP PRB: CPT

## 2023-11-02 PROCEDURE — 99285 EMERGENCY DEPT VISIT HI MDM: CPT

## 2023-11-02 PROCEDURE — 6360000002 HC RX W HCPCS: Performed by: EMERGENCY MEDICINE

## 2023-11-02 PROCEDURE — 6370000000 HC RX 637 (ALT 250 FOR IP): Performed by: EMERGENCY MEDICINE

## 2023-11-02 RX ORDER — PRAZOSIN HYDROCHLORIDE 1 MG/1
4 CAPSULE ORAL 2 TIMES DAILY
Status: DISCONTINUED | OUTPATIENT
Start: 2023-11-02 | End: 2023-11-07 | Stop reason: HOSPADM

## 2023-11-02 RX ORDER — SODIUM CHLORIDE 0.9 % (FLUSH) 0.9 %
5-40 SYRINGE (ML) INJECTION EVERY 12 HOURS SCHEDULED
Status: DISCONTINUED | OUTPATIENT
Start: 2023-11-03 | End: 2023-11-07 | Stop reason: HOSPADM

## 2023-11-02 RX ORDER — BUTALBITAL, ACETAMINOPHEN AND CAFFEINE 50; 325; 40 MG/1; MG/1; MG/1
1 TABLET ORAL ONCE
Status: COMPLETED | OUTPATIENT
Start: 2023-11-02 | End: 2023-11-02

## 2023-11-02 RX ORDER — ONDANSETRON 4 MG/1
4 TABLET, ORALLY DISINTEGRATING ORAL EVERY 8 HOURS PRN
Status: DISCONTINUED | OUTPATIENT
Start: 2023-11-02 | End: 2023-11-07 | Stop reason: HOSPADM

## 2023-11-02 RX ORDER — ACETAMINOPHEN 325 MG/1
650 TABLET ORAL EVERY 6 HOURS PRN
Status: DISCONTINUED | OUTPATIENT
Start: 2023-11-02 | End: 2023-11-07 | Stop reason: HOSPADM

## 2023-11-02 RX ORDER — ACETAMINOPHEN 650 MG/1
650 SUPPOSITORY RECTAL EVERY 6 HOURS PRN
Status: DISCONTINUED | OUTPATIENT
Start: 2023-11-02 | End: 2023-11-07 | Stop reason: HOSPADM

## 2023-11-02 RX ORDER — ONDANSETRON 2 MG/ML
4 INJECTION INTRAMUSCULAR; INTRAVENOUS EVERY 6 HOURS PRN
Status: DISCONTINUED | OUTPATIENT
Start: 2023-11-02 | End: 2023-11-07 | Stop reason: HOSPADM

## 2023-11-02 RX ORDER — HYDRALAZINE HYDROCHLORIDE 20 MG/ML
20 INJECTION INTRAMUSCULAR; INTRAVENOUS ONCE
Status: DISCONTINUED | OUTPATIENT
Start: 2023-11-02 | End: 2023-11-02

## 2023-11-02 RX ORDER — METHYLPREDNISOLONE SODIUM SUCCINATE 125 MG/2ML
125 INJECTION, POWDER, LYOPHILIZED, FOR SOLUTION INTRAMUSCULAR; INTRAVENOUS ONCE
Status: DISCONTINUED | OUTPATIENT
Start: 2023-11-02 | End: 2023-11-02

## 2023-11-02 RX ORDER — METOCLOPRAMIDE 10 MG/1
10 TABLET ORAL ONCE
Status: COMPLETED | OUTPATIENT
Start: 2023-11-02 | End: 2023-11-02

## 2023-11-02 RX ORDER — METOCLOPRAMIDE HYDROCHLORIDE 5 MG/ML
10 INJECTION INTRAMUSCULAR; INTRAVENOUS ONCE
Status: DISCONTINUED | OUTPATIENT
Start: 2023-11-02 | End: 2023-11-02

## 2023-11-02 RX ORDER — HYDRALAZINE HYDROCHLORIDE 25 MG/1
50 TABLET, FILM COATED ORAL ONCE
Status: COMPLETED | OUTPATIENT
Start: 2023-11-02 | End: 2023-11-02

## 2023-11-02 RX ORDER — POLYETHYLENE GLYCOL 3350 17 G/17G
17 POWDER, FOR SOLUTION ORAL DAILY PRN
Status: DISCONTINUED | OUTPATIENT
Start: 2023-11-02 | End: 2023-11-07 | Stop reason: HOSPADM

## 2023-11-02 RX ORDER — SODIUM CHLORIDE 0.9 % (FLUSH) 0.9 %
5-40 SYRINGE (ML) INJECTION PRN
Status: DISCONTINUED | OUTPATIENT
Start: 2023-11-02 | End: 2023-11-07 | Stop reason: HOSPADM

## 2023-11-02 RX ORDER — ORPHENADRINE CITRATE 30 MG/ML
60 INJECTION INTRAMUSCULAR; INTRAVENOUS ONCE
Status: COMPLETED | OUTPATIENT
Start: 2023-11-02 | End: 2023-11-02

## 2023-11-02 RX ORDER — SODIUM CHLORIDE 9 MG/ML
INJECTION, SOLUTION INTRAVENOUS PRN
Status: DISCONTINUED | OUTPATIENT
Start: 2023-11-02 | End: 2023-11-07 | Stop reason: HOSPADM

## 2023-11-02 RX ADMIN — INSULIN HUMAN 10 UNITS: 100 INJECTION, SOLUTION PARENTERAL at 21:33

## 2023-11-02 RX ADMIN — ORPHENADRINE CITRATE 60 MG: 30 INJECTION INTRAMUSCULAR; INTRAVENOUS at 21:34

## 2023-11-02 RX ADMIN — PREDNISONE 50 MG: 20 TABLET ORAL at 20:36

## 2023-11-02 RX ADMIN — BUTALBITAL, ACETAMINOPHEN, AND CAFFEINE 1 TABLET: 50; 325; 40 TABLET ORAL at 20:24

## 2023-11-02 RX ADMIN — HYDRALAZINE HYDROCHLORIDE 50 MG: 25 TABLET, FILM COATED ORAL at 20:35

## 2023-11-02 RX ADMIN — PRAZOSIN HYDROCHLORIDE 4 MG: 1 CAPSULE ORAL at 22:17

## 2023-11-02 RX ADMIN — METOCLOPRAMIDE HYDROCHLORIDE 10 MG: 10 TABLET ORAL at 20:35

## 2023-11-02 ASSESSMENT — ENCOUNTER SYMPTOMS
ABDOMINAL PAIN: 0
DIARRHEA: 0
BACK PAIN: 0
COUGH: 0
VOMITING: 0
RHINORRHEA: 0
CHEST TIGHTNESS: 0
SHORTNESS OF BREATH: 0

## 2023-11-02 ASSESSMENT — PAIN DESCRIPTION - DESCRIPTORS: DESCRIPTORS: ACHING

## 2023-11-02 ASSESSMENT — PAIN SCALES - GENERAL
PAINLEVEL_OUTOF10: 7
PAINLEVEL_OUTOF10: 10

## 2023-11-02 ASSESSMENT — PAIN - FUNCTIONAL ASSESSMENT: PAIN_FUNCTIONAL_ASSESSMENT: 0-10

## 2023-11-02 ASSESSMENT — PAIN DESCRIPTION - LOCATION: LOCATION: HEAD

## 2023-11-02 NOTE — PROGRESS NOTES
encouraged. Exercise Counselling: This patient is a candidate for regular physical exercise. Instructions to perform the following types of exercise:  Swimming or water aerobic exercise  Brisk walking  Playing tennis  Stationary bicycle or elliptical indoor  Low impact aerobic exercise    Instructions given to exercise for the following duration:  30 minutes a day for five-seven days per week.     Following instructions for being active throughout the day in addition to formal exercise:  Walk instead of drive whenever possible  Take the stairs instead of the elevator  Work in the garden  Park to the far end of the parking lot to add more walking steps to destination      Electronically signed by Jeremie Hicks MD on 11/2/2023 at 3:07 PM

## 2023-11-02 NOTE — PATIENT INSTRUCTIONS
C/w Glimepiride 4 mg with breakfast or first meal of the day     Change Lantus to 8 units daily in am.  Victoza 1.8 mg daily in AM    Change Novolog SS                     With meals (during the day)   - at bedtime    < 150 ---    3 units                                 0 units  151-200 --  4 units                                 0 units  201-250 :    5 units                                 1 units  251-300:     6 units                                 2 units  301-350:     7 units                                 3 units  >350 :         8   units                               4 units

## 2023-11-03 LAB
ANION GAP SERPL CALCULATED.3IONS-SCNC: 10 MMOL/L (ref 3–16)
BUN SERPL-MCNC: 25 MG/DL (ref 7–20)
C DIFF TOX A+B STL QL IA: NORMAL
CALCIUM SERPL-MCNC: 8.7 MG/DL (ref 8.3–10.6)
CHLORIDE SERPL-SCNC: 106 MMOL/L (ref 99–110)
CO2 SERPL-SCNC: 21 MMOL/L (ref 21–32)
CREAT SERPL-MCNC: 1.5 MG/DL (ref 0.6–1.2)
CREAT UR-MCNC: 108.6 MG/DL (ref 28–259)
GFR SERPLBLD CREATININE-BSD FMLA CKD-EPI: 39 ML/MIN/{1.73_M2}
GLUCOSE BLD-MCNC: 116 MG/DL (ref 70–99)
GLUCOSE BLD-MCNC: 194 MG/DL (ref 70–99)
GLUCOSE BLD-MCNC: 209 MG/DL (ref 70–99)
GLUCOSE BLD-MCNC: 313 MG/DL (ref 70–99)
GLUCOSE BLD-MCNC: 410 MG/DL (ref 70–99)
GLUCOSE BLD-MCNC: 92 MG/DL (ref 70–99)
GLUCOSE SERPL-MCNC: 296 MG/DL (ref 70–99)
MICROALBUMIN UR DL<=1MG/L-MCNC: 207.9 MG/DL
MICROALBUMIN/CREAT UR: 1914.4 MG/G (ref 0–30)
PERFORMED ON: ABNORMAL
PERFORMED ON: NORMAL
POTASSIUM SERPL-SCNC: 5 MMOL/L (ref 3.5–5.1)
SODIUM SERPL-SCNC: 137 MMOL/L (ref 136–145)

## 2023-11-03 PROCEDURE — 87506 IADNA-DNA/RNA PROBE TQ 6-11: CPT

## 2023-11-03 PROCEDURE — G0378 HOSPITAL OBSERVATION PER HR: HCPCS

## 2023-11-03 PROCEDURE — 87324 CLOSTRIDIUM AG IA: CPT

## 2023-11-03 PROCEDURE — 87493 C DIFF AMPLIFIED PROBE: CPT

## 2023-11-03 PROCEDURE — 2580000003 HC RX 258: Performed by: HOSPITALIST

## 2023-11-03 PROCEDURE — 80048 BASIC METABOLIC PNL TOTAL CA: CPT

## 2023-11-03 PROCEDURE — 6370000000 HC RX 637 (ALT 250 FOR IP): Performed by: STUDENT IN AN ORGANIZED HEALTH CARE EDUCATION/TRAINING PROGRAM

## 2023-11-03 PROCEDURE — 6370000000 HC RX 637 (ALT 250 FOR IP): Performed by: EMERGENCY MEDICINE

## 2023-11-03 PROCEDURE — 36415 COLL VENOUS BLD VENIPUNCTURE: CPT

## 2023-11-03 PROCEDURE — 6370000000 HC RX 637 (ALT 250 FOR IP): Performed by: HOSPITALIST

## 2023-11-03 PROCEDURE — 87449 NOS EACH ORGANISM AG IA: CPT

## 2023-11-03 RX ORDER — OXYCODONE AND ACETAMINOPHEN 7.5; 325 MG/1; MG/1
1 TABLET ORAL EVERY 4 HOURS PRN
Status: DISCONTINUED | OUTPATIENT
Start: 2023-11-03 | End: 2023-11-07 | Stop reason: HOSPADM

## 2023-11-03 RX ORDER — ASPIRIN 81 MG/1
81 TABLET ORAL DAILY
Status: DISCONTINUED | OUTPATIENT
Start: 2023-11-03 | End: 2023-11-07 | Stop reason: HOSPADM

## 2023-11-03 RX ORDER — AMLODIPINE BESYLATE 5 MG/1
10 TABLET ORAL DAILY
Status: DISCONTINUED | OUTPATIENT
Start: 2023-11-04 | End: 2023-11-07 | Stop reason: HOSPADM

## 2023-11-03 RX ORDER — LOSARTAN POTASSIUM 25 MG/1
25 TABLET ORAL DAILY
Status: DISCONTINUED | OUTPATIENT
Start: 2023-11-03 | End: 2023-11-07 | Stop reason: HOSPADM

## 2023-11-03 RX ORDER — INSULIN GLARGINE 100 [IU]/ML
12 INJECTION, SOLUTION SUBCUTANEOUS EVERY MORNING
Status: DISCONTINUED | OUTPATIENT
Start: 2023-11-03 | End: 2023-11-07 | Stop reason: HOSPADM

## 2023-11-03 RX ORDER — INSULIN LISPRO 100 [IU]/ML
0-4 INJECTION, SOLUTION INTRAVENOUS; SUBCUTANEOUS NIGHTLY
Status: DISCONTINUED | OUTPATIENT
Start: 2023-11-03 | End: 2023-11-07 | Stop reason: HOSPADM

## 2023-11-03 RX ORDER — CLONAZEPAM 1 MG/1
1 TABLET ORAL EVERY 12 HOURS PRN
Status: DISCONTINUED | OUTPATIENT
Start: 2023-11-03 | End: 2023-11-07 | Stop reason: HOSPADM

## 2023-11-03 RX ORDER — INSULIN LISPRO 100 [IU]/ML
4 INJECTION, SOLUTION INTRAVENOUS; SUBCUTANEOUS
Status: DISCONTINUED | OUTPATIENT
Start: 2023-11-03 | End: 2023-11-07 | Stop reason: HOSPADM

## 2023-11-03 RX ORDER — TRAZODONE HYDROCHLORIDE 50 MG/1
200 TABLET ORAL NIGHTLY
Status: DISCONTINUED | OUTPATIENT
Start: 2023-11-03 | End: 2023-11-07 | Stop reason: HOSPADM

## 2023-11-03 RX ORDER — DEXTROSE MONOHYDRATE 100 MG/ML
INJECTION, SOLUTION INTRAVENOUS CONTINUOUS PRN
Status: DISCONTINUED | OUTPATIENT
Start: 2023-11-03 | End: 2023-11-07 | Stop reason: HOSPADM

## 2023-11-03 RX ORDER — PANTOPRAZOLE SODIUM 40 MG/1
40 TABLET, DELAYED RELEASE ORAL DAILY
Status: DISCONTINUED | OUTPATIENT
Start: 2023-11-03 | End: 2023-11-07 | Stop reason: HOSPADM

## 2023-11-03 RX ORDER — AMLODIPINE BESYLATE 5 MG/1
5 TABLET ORAL DAILY
Status: DISCONTINUED | OUTPATIENT
Start: 2023-11-03 | End: 2023-11-03

## 2023-11-03 RX ORDER — GABAPENTIN 100 MG/1
200 CAPSULE ORAL 3 TIMES DAILY
Status: DISCONTINUED | OUTPATIENT
Start: 2023-11-03 | End: 2023-11-07 | Stop reason: HOSPADM

## 2023-11-03 RX ORDER — INSULIN LISPRO 100 [IU]/ML
0-16 INJECTION, SOLUTION INTRAVENOUS; SUBCUTANEOUS
Status: DISCONTINUED | OUTPATIENT
Start: 2023-11-03 | End: 2023-11-07 | Stop reason: HOSPADM

## 2023-11-03 RX ORDER — INSULIN LISPRO 100 [IU]/ML
0-4 INJECTION, SOLUTION INTRAVENOUS; SUBCUTANEOUS EVERY 4 HOURS
Status: DISCONTINUED | OUTPATIENT
Start: 2023-11-03 | End: 2023-11-03

## 2023-11-03 RX ADMIN — LOSARTAN POTASSIUM 25 MG: 25 TABLET, FILM COATED ORAL at 09:45

## 2023-11-03 RX ADMIN — GABAPENTIN 200 MG: 100 CAPSULE ORAL at 13:10

## 2023-11-03 RX ADMIN — Medication 10 ML: at 09:48

## 2023-11-03 RX ADMIN — INSULIN LISPRO 3 UNITS: 100 INJECTION, SOLUTION INTRAVENOUS; SUBCUTANEOUS at 09:47

## 2023-11-03 RX ADMIN — PANTOPRAZOLE SODIUM 40 MG: 40 TABLET, DELAYED RELEASE ORAL at 09:45

## 2023-11-03 RX ADMIN — OXYCODONE HYDROCHLORIDE AND ACETAMINOPHEN 1 TABLET: 7.5; 325 TABLET ORAL at 20:29

## 2023-11-03 RX ADMIN — AMLODIPINE BESYLATE 5 MG: 5 TABLET ORAL at 09:45

## 2023-11-03 RX ADMIN — INSULIN LISPRO 4 UNITS: 100 INJECTION, SOLUTION INTRAVENOUS; SUBCUTANEOUS at 13:07

## 2023-11-03 RX ADMIN — PRAZOSIN HYDROCHLORIDE 4 MG: 1 CAPSULE ORAL at 09:45

## 2023-11-03 RX ADMIN — ACETAMINOPHEN 650 MG: 325 TABLET ORAL at 09:46

## 2023-11-03 RX ADMIN — TRAZODONE HYDROCHLORIDE 200 MG: 50 TABLET ORAL at 01:26

## 2023-11-03 RX ADMIN — OXYCODONE HYDROCHLORIDE AND ACETAMINOPHEN 1 TABLET: 7.5; 325 TABLET ORAL at 13:09

## 2023-11-03 RX ADMIN — GABAPENTIN 200 MG: 100 CAPSULE ORAL at 20:29

## 2023-11-03 RX ADMIN — TICAGRELOR 90 MG: 90 TABLET ORAL at 20:30

## 2023-11-03 RX ADMIN — Medication 10 ML: at 20:30

## 2023-11-03 RX ADMIN — TICAGRELOR 90 MG: 90 TABLET ORAL at 09:47

## 2023-11-03 RX ADMIN — INSULIN GLARGINE 12 UNITS: 100 INJECTION, SOLUTION SUBCUTANEOUS at 09:47

## 2023-11-03 RX ADMIN — TRAZODONE HYDROCHLORIDE 200 MG: 50 TABLET ORAL at 20:29

## 2023-11-03 RX ADMIN — INSULIN LISPRO 4 UNITS: 100 INJECTION, SOLUTION INTRAVENOUS; SUBCUTANEOUS at 18:03

## 2023-11-03 RX ADMIN — PRAZOSIN HYDROCHLORIDE 4 MG: 1 CAPSULE ORAL at 21:14

## 2023-11-03 RX ADMIN — GABAPENTIN 200 MG: 100 CAPSULE ORAL at 09:45

## 2023-11-03 RX ADMIN — INSULIN LISPRO 16 UNITS: 100 INJECTION, SOLUTION INTRAVENOUS; SUBCUTANEOUS at 13:08

## 2023-11-03 RX ADMIN — ASPIRIN 81 MG: 81 TABLET, COATED ORAL at 09:45

## 2023-11-03 ASSESSMENT — PAIN DESCRIPTION - LOCATION
LOCATION: HEAD
LOCATION: BACK
LOCATION: HEAD;BACK
LOCATION: HEAD

## 2023-11-03 ASSESSMENT — PAIN SCALES - GENERAL
PAINLEVEL_OUTOF10: 4
PAINLEVEL_OUTOF10: 6
PAINLEVEL_OUTOF10: 5
PAINLEVEL_OUTOF10: 0
PAINLEVEL_OUTOF10: 7
PAINLEVEL_OUTOF10: 4
PAINLEVEL_OUTOF10: 7

## 2023-11-03 ASSESSMENT — PAIN DESCRIPTION - FREQUENCY: FREQUENCY: CONTINUOUS

## 2023-11-03 ASSESSMENT — PAIN DESCRIPTION - ORIENTATION: ORIENTATION: LOWER

## 2023-11-03 ASSESSMENT — PAIN DESCRIPTION - PAIN TYPE: TYPE: CHRONIC PAIN

## 2023-11-03 ASSESSMENT — PAIN - FUNCTIONAL ASSESSMENT: PAIN_FUNCTIONAL_ASSESSMENT: PREVENTS OR INTERFERES SOME ACTIVE ACTIVITIES AND ADLS

## 2023-11-03 ASSESSMENT — PAIN DESCRIPTION - DESCRIPTORS
DESCRIPTORS: ACHING
DESCRIPTORS: ACHING
DESCRIPTORS: ACHING;THROBBING

## 2023-11-03 ASSESSMENT — PAIN DESCRIPTION - ONSET: ONSET: ON-GOING

## 2023-11-03 NOTE — CONSULTS
Oncology Hematology Care    Consult Note      Requesting Physician:  Patients family    CHIEF COMPLAINT:  History of colon cancer      HISTORY OF PRESENT ILLNESS:    Ms. Marty Degroot  is a 58 y.o. female we are seeing in consultation for history of colon cancer diagnosed 3/7/2023. She did well post operatively and did not receive chemotx. She was admitted with hypertensive urgency. I was asked to see her because she has foul smelling stool, weight loss and a lot of gas. ICD-10-CM    1. Hypertensive urgency  I16.0       2. Uncontrolled hypertension  I10       3. Hyperglycemia  R73.9       4. Nonintractable headache, unspecified chronicity pattern, unspecified headache type  R51.9       5. Elevated liver enzymes  R74.8       6. Acute kidney injury superimposed on CKD ([address])  N17.9     N18.9              Past Medical History:  Past Medical History:   Diagnosis Date    Abnormal brain MRI 7/20/2017    Partially empty sella and minimal chronic small vessel ischemic disease    Acute bilateral low back pain without sciatica 11/2/2016    SHARRON (acute kidney injury) ([address]) 7/5/2017    Arthritis     back    Bipolar disorder ([address]) 10/18/2008    CAD (coronary artery disease)     stent placed 6/8/20    Cancer ([address]) 2015    bilateral breast:s/p lumpectomy/radiation:under care care of breast specialist:Dr. Boone     Carotid stenosis, bilateral:<50%:per [place] 7/2016 7/15/2016    Carpal tunnel syndrome 10/18/2008    Cervical cancer screening 2014    Nml per pt'.     Coronary artery disease of native artery of native heart with stable angina pectoris ([address]) 6/9/2020    DDD (degenerative disc disease), lumbar 7/18/2018    Depression     under care of pschiatrist:Dr. Emmy Shaver    Depression/anxiety 7/5/2017    Depression/anxiety     Diabetes mellitus ([address])     Gout     History of mammogram 10/28/2016;8/14/17    Negative    History of

## 2023-11-03 NOTE — ED NOTES
RN to attempt IV start x3, no success. Bloodwork obtained. Provider aware.      West Garcia RN  11/02/23 2012

## 2023-11-03 NOTE — H&P
Podiatry Consult Note      History of Present Illness: The patient is a pleasant 58year old woman with DM2 who we were asked to see for a possible wound at the left foot. The patient has had some pain under the left first MTPJ for several weeks. She usually sees her Podiatrist (Dr. Scot Lea) for care, but she has had difficulty making it to appointments. She has no other pedal complaints. She was admitted with hypertensive urgency. Past Medical History:        Diagnosis Date    Abnormal brain MRI 7/20/2017    Partially empty sella and minimal chronic small vessel ischemic disease    Acute bilateral low back pain without sciatica 11/2/2016    SHARRON (acute kidney injury) ([address]) 7/5/2017    Arthritis     back    Bipolar disorder ([address]) 10/18/2008    CAD (coronary artery disease)     stent placed 6/8/20    Cancer ([address]) 2015    bilateral breast:s/p lumpectomy/radiation:under care care of breast specialist:Dr. Boone     Carotid stenosis, bilateral:<50%:per US 7/2016 7/15/2016    Carpal tunnel syndrome 10/18/2008    Cervical cancer screening 2014    Nml per pt'. Coronary artery disease of native artery of native heart with stable angina pectoris ([address]) 6/9/2020    DDD (degenerative disc disease), lumbar 7/18/2018    Depression     under care of pschiatrist:Dr. 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     Scoliosis     Spondylosis
pectoris ([facility]) 6/9/2020    DDD (degenerative disc disease), lumbar 7/18/2018    Depression     under care of pschiatrist:Dr. 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)     Non morbid obesity 7/1/2016    Pancreatitis 5/12/16    [facility] hospitalization 5/12/16-5/16/16:under care of GI:chronic pancreatitis    S/P endoscopy 6/14/2016    [facility]:per pt' & her family member was nml. 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 myelopathy or radiculopathy 3/10/2017    Transient cerebral ischemia 07/15/2016    TIA:7/10/16    Unspecified cerebral artery occlusion with cerebral infarction     TIA     PSHX:  has a past surgical history that includes Kidney removal; Hysterectomy; Breast lumpectomy (2015); Tubal ligation; other surgical history (Right); Cardiac catheterization (06/08/2020); Upper gastrointestinal endoscopy (N/A, 1/29/2021); Colonoscopy (N/A, 2/1/2021); CT BIOPSY BONE MARROW (2/3/2021); Temporal Artery Biopsy (Right, 8/9/2021); Upper gastrointestinal endoscopy (N/A, 12/15/2022); Colonoscopy (N/A, 2/27/2023); and hemicolectomy (N/A, 3/7/2023). Allergies: Allergies   Allergen Reactions    Morphine Anaphylaxis and Hives     feels like throat is closing    Penicillins Hives and Swelling    Codeine Hives and Rash    Penicillin G Rash     Fam HX:  family history includes Cancer in her father and mother.   Soc HX:   Social History     Socioeconomic History    Marital status:      Spouse name: None    Number of children: 1    Years of education: None    Highest education level: None   Occupational History    Occupation:

## 2023-11-03 NOTE — CONSULTS
Consult Placed     Who: James Gentile  Date:11/03/23  [id number]     Electronically signed by Savanna Levi on 11/3/2023 at 1:26 PM

## 2023-11-03 NOTE — CONSULTS
[facility] Continence Nurse  Consult Note       NAME:  Barry Burch  MEDICAL RECORD NUMBER:  [id number]  AGE: 58 y.o. GENDER: female  : 1960  TODAY'S DATE:  11/3/2023    Subjective; Hello, O,=potassium. (Patient kept head covered with blanket)   Reason for WOCN Evaluation and Assessment:    L foot wound   Barry Burch is a 58 y.o. female referred by:   [] Physician  [x] Nursing  [] Other:     Wound Identification:  Wound Type: diabetic and pressure  Contributing Factors: diabetes, chronic pressure, and shear force    Wound History: 58 y.o. female with a pmh of Colon cancer s/p right hemicolectomy, CAD, PVD, stage 3a CKD, IDDM2,   who presents with Hypertensive urgency. Patient does follow a Podiatrist.  Next apt . Current Wound Care Treatment:  TORSTEN    Patient Goal of Care:  [x] Wound Healing  [] Odor Control  [] Palliative Care  [x] Pain Control   [] Other:         PAST MEDICAL HISTORY        Diagnosis Date    Abnormal brain MRI 2017    Partially empty sella and minimal chronic small vessel ischemic disease    Acute bilateral low back pain without sciatica 2016    SHARRON (acute kidney injury) ([address]) 2017    Arthritis     back    Bipolar disorder ([address]) 10/18/2008    CAD (coronary artery disease)     stent placed 20    Cancer ([address])     bilateral breast:s/p lumpectomy/radiation:under care care of breast specialist:Dr. Boone     Carotid stenosis, bilateral:<50%:per [place] 2016 7/15/2016    Carpal tunnel syndrome 10/18/2008    Cervical cancer screening 2014    Nml per pt'.     Coronary artery disease of native artery of native heart with stable angina pectoris ([address]) 2020    DDD (degenerative disc disease), lumbar 2018    Depression     under care of pschiatrist:Dr. Aj Zamora    Depression/anxiety 2017    Depression/anxiety     Diabetes mellitus ([address])     Gout     History of mammogram 10/28/2016;17    Negative    History of therapeutic radiation     Hyperlipidemia

## 2023-11-03 NOTE — ED PROVIDER NOTES
Emergency Department Attending Physician Note  Location: [address] [place] [address]  ED  11/2/2023       Pt Name: Sahil Elias  MRN: [id number]  [facility] 1960    Date of evaluation: 11/2/2023  Provider: Aime Gutierrez DO  PCP: SARTHAK Vazquez NP    Note Started: 9:02 PM EDT 11/2/23    CHIEF COMPLAINT  Chief Complaint   Patient presents with    Headache     Patient states that she has had a headache for two days. HISTORY OF PRESENT ILLNESS:  History obtained by patient. Limitations to history : None. Sahil Elias is a 58 y.o. female with a significant PMHx of CKD, CAD, hypertension, on multiple therapeutic agents, and multiple other comorbidities as listed below, presenting emergency department today with what she describes as a frontal headache for 2 days. Not sudden in onset, not worst headache of life, but she is quite concerned because she also saw her cardiologist today and her blood pressure was elevated. In the office, it was 180s, but here she arrives emergency department at 008 systolic over 88. No numbness, weakness, tingling, vision changes. No n nausea, facial pain, difficulty with speech. Otherwise, no falls or injuries. Has been very compliant with her medication. On chart review, patient takes Minipress, Norvasc, and multiple other medications, at risk for polypharmacy. She takes Brilinta, Plavix, but also Invega, trazodone, gabapentin, and Klonopin. Nursing Notes were all reviewed and agreed with or any disagreements were addressed in the HPI.       MEDICAL HISTORY  Past Medical History:   Diagnosis Date    Abnormal brain MRI 7/20/2017    Partially empty sella and minimal chronic small vessel ischemic disease    Acute bilateral low back pain without sciatica 11/2/2016    SHARRON (acute kidney injury) ([address]) 7/5/2017    Arthritis     back    Bipolar disorder ([address]) 10/18/2008    CAD (coronary artery disease)     stent placed 6/8/20    Cancer ([address]) 2015    bilateral

## 2023-11-03 NOTE — CARE COORDINATION
Case Management Assessment  Initial Evaluation    Date/Time of Evaluation: 11/3/2023 2:36 PM  Assessment Completed by: Benita Servin RN    If patient is discharged prior to next notation, then this note serves as note for discharge by case management. Patient Name: Mica Hogan                   YOB: 1960  Diagnosis: Hyperglycemia [R73.9]  Hypertensive urgency [I16.0]  Elevated liver enzymes [R74.8]  Uncontrolled hypertension [I10]  Nonintractable headache, unspecified chronicity pattern, unspecified headache type [R51.9]  Acute kidney injury superimposed on CKD ([address]) [N17.9, N18.9]                   Date / Time: 11/2/2023  7:01 PM    Patient Admission Status: Observation   Readmission Risk (Low < 19, Mod (19-27), High > 27): Readmission Risk Score: 17.3    Current PCP: SARTHAK Felder NP  PCP verified by CM? Chart Reviewed: Yes      History Provided by:    Patient Orientation:      Patient Cognition:      Hospitalization in the last 30 days (Readmission):  No    If yes, Readmission Assessment in CM Navigator will be completed. Advance Directives:      Code Status: Full Code   Patient's Primary Decision Maker is:      Primary Decision Maker: Pacheco Anaya - [phone]    Secondary Decision Maker: Moi Fried - Brother/Sister - [phone]    Supplemental (Other) Decision Maker: Jeanette Fuller - Other - [phone]    Discharge Planning:    Patient lives with:   Type of Home:    Primary Care Giver:    Patient Support Systems include:     Current Financial resources:    Current community resources:    Current services prior to admission:              Current DME:              Type of Home Care services:       ADLS  Prior functional level:    Current functional level:      PT AM-PAC:   /24  OT AM-PAC:   /24    Family can provide assistance at DC:     Would you like Case Management to discuss the discharge plan with any other family members/significant others, and if so,

## 2023-11-03 NOTE — PLAN OF CARE
Problem: Discharge Planning  Goal: Discharge to home or other facility with appropriate resources  Outcome: Progressing  Flowsheets (Taken 11/3/2023 0115)  Discharge to home or other facility with appropriate resources: Identify barriers to discharge with patient and caregiver     Problem: Pain  Goal: Verbalizes/displays adequate comfort level or baseline comfort level  Outcome: Progressing  Flowsheets (Taken 11/3/2023 0115)  Verbalizes/displays adequate comfort level or baseline comfort level: Encourage patient to monitor pain and request assistance     Problem: Safety - Adult  Goal: Free from fall injury  Outcome: Progressing

## 2023-11-03 NOTE — ACP (ADVANCE CARE PLANNING)
Advance Care Planning     General Advance Care Planning (ACP) Conversation    Date of Conversation: 11/2/2023  Conducted with: Patient with [address]: Next of Kin by law (only applies in absence of above) (name) Eric Gentile (son) [phone]    Healthcare Decision Maker:    Primary Decision Maker: Jayne Boudreaux - [phone]    Secondary Decision Maker: Moi Fried - Brother/Sister - [phone]    Supplemental (Other) Decision Maker: Cody President - Other - [phone]  Click here to complete Healthcare Decision Makers including selection of the Healthcare Decision Maker Relationship (ie \"Primary\"). Today we documented Decision Maker(s) consistent with Legal Next of Kin hierarchy.     Content/Action Overview:  Has NO ACP documents/care preferences - information provided, considering goals and options  Reviewed DNR/DNI and patient elects Full Code (Attempt Resuscitation)      Length of Voluntary ACP Conversation in minutes:  <16 minutes (Non-Billable)    Mitch Braxton RN

## 2023-11-04 LAB
GLUCOSE BLD-MCNC: 157 MG/DL (ref 70–99)
GLUCOSE BLD-MCNC: 210 MG/DL (ref 70–99)
GLUCOSE BLD-MCNC: 242 MG/DL (ref 70–99)
GLUCOSE BLD-MCNC: 297 MG/DL (ref 70–99)
GLUCOSE BLD-MCNC: 69 MG/DL (ref 70–99)
PERFORMED ON: ABNORMAL

## 2023-11-04 PROCEDURE — 6370000000 HC RX 637 (ALT 250 FOR IP): Performed by: HOSPITALIST

## 2023-11-04 PROCEDURE — 0HBNXZZ EXCISION OF LEFT FOOT SKIN, EXTERNAL APPROACH: ICD-10-PCS | Performed by: INTERNAL MEDICINE

## 2023-11-04 PROCEDURE — 6370000000 HC RX 637 (ALT 250 FOR IP): Performed by: EMERGENCY MEDICINE

## 2023-11-04 PROCEDURE — 2580000003 HC RX 258: Performed by: HOSPITALIST

## 2023-11-04 PROCEDURE — G0378 HOSPITAL OBSERVATION PER HR: HCPCS

## 2023-11-04 PROCEDURE — 6370000000 HC RX 637 (ALT 250 FOR IP): Performed by: STUDENT IN AN ORGANIZED HEALTH CARE EDUCATION/TRAINING PROGRAM

## 2023-11-04 RX ORDER — VANCOMYCIN HYDROCHLORIDE 50 MG/ML
125 KIT ORAL EVERY 6 HOURS SCHEDULED
Status: DISCONTINUED | OUTPATIENT
Start: 2023-11-04 | End: 2023-11-07 | Stop reason: HOSPADM

## 2023-11-04 RX ADMIN — TICAGRELOR 90 MG: 90 TABLET ORAL at 07:45

## 2023-11-04 RX ADMIN — OXYCODONE HYDROCHLORIDE AND ACETAMINOPHEN 1 TABLET: 7.5; 325 TABLET ORAL at 15:27

## 2023-11-04 RX ADMIN — ASPIRIN 81 MG: 81 TABLET, COATED ORAL at 07:45

## 2023-11-04 RX ADMIN — Medication 10 ML: at 21:16

## 2023-11-04 RX ADMIN — INSULIN LISPRO 4 UNITS: 100 INJECTION, SOLUTION INTRAVENOUS; SUBCUTANEOUS at 09:01

## 2023-11-04 RX ADMIN — TICAGRELOR 90 MG: 90 TABLET ORAL at 21:15

## 2023-11-04 RX ADMIN — PRAZOSIN HYDROCHLORIDE 4 MG: 1 CAPSULE ORAL at 21:22

## 2023-11-04 RX ADMIN — LOSARTAN POTASSIUM 25 MG: 25 TABLET, FILM COATED ORAL at 07:46

## 2023-11-04 RX ADMIN — GABAPENTIN 200 MG: 100 CAPSULE ORAL at 07:46

## 2023-11-04 RX ADMIN — INSULIN GLARGINE 12 UNITS: 100 INJECTION, SOLUTION SUBCUTANEOUS at 09:01

## 2023-11-04 RX ADMIN — TRAZODONE HYDROCHLORIDE 200 MG: 50 TABLET ORAL at 21:15

## 2023-11-04 RX ADMIN — AMLODIPINE BESYLATE 10 MG: 5 TABLET ORAL at 07:46

## 2023-11-04 RX ADMIN — GABAPENTIN 200 MG: 100 CAPSULE ORAL at 13:59

## 2023-11-04 RX ADMIN — INSULIN LISPRO 4 UNITS: 100 INJECTION, SOLUTION INTRAVENOUS; SUBCUTANEOUS at 12:34

## 2023-11-04 RX ADMIN — GABAPENTIN 200 MG: 100 CAPSULE ORAL at 21:15

## 2023-11-04 RX ADMIN — INSULIN LISPRO 4 UNITS: 100 INJECTION, SOLUTION INTRAVENOUS; SUBCUTANEOUS at 12:35

## 2023-11-04 RX ADMIN — PRAZOSIN HYDROCHLORIDE 4 MG: 1 CAPSULE ORAL at 07:45

## 2023-11-04 RX ADMIN — Medication 125 MG: at 21:15

## 2023-11-04 RX ADMIN — INSULIN LISPRO 4 UNITS: 100 INJECTION, SOLUTION INTRAVENOUS; SUBCUTANEOUS at 09:02

## 2023-11-04 RX ADMIN — Medication 10 ML: at 07:45

## 2023-11-04 RX ADMIN — PANTOPRAZOLE SODIUM 40 MG: 40 TABLET, DELAYED RELEASE ORAL at 07:45

## 2023-11-04 RX ADMIN — OXYCODONE HYDROCHLORIDE AND ACETAMINOPHEN 1 TABLET: 7.5; 325 TABLET ORAL at 07:45

## 2023-11-04 RX ADMIN — OXYCODONE HYDROCHLORIDE AND ACETAMINOPHEN 1 TABLET: 7.5; 325 TABLET ORAL at 23:23

## 2023-11-04 ASSESSMENT — PAIN SCALES - GENERAL
PAINLEVEL_OUTOF10: 7
PAINLEVEL_OUTOF10: 6
PAINLEVEL_OUTOF10: 6
PAINLEVEL_OUTOF10: 7

## 2023-11-04 ASSESSMENT — PAIN DESCRIPTION - PAIN TYPE: TYPE: CHRONIC PAIN

## 2023-11-04 ASSESSMENT — PAIN DESCRIPTION - LOCATION
LOCATION: BACK
LOCATION: BACK

## 2023-11-04 NOTE — PLAN OF CARE
Problem: Discharge Planning  Goal: Discharge to home or other facility with appropriate resources  11/4/2023 0156 by Rupert Saleh RN  Outcome: Progressing  11/3/2023 7518 by Zafar Obrien RN  Outcome: Progressing     Problem: Pain  Goal: Verbalizes/displays adequate comfort level or baseline comfort level  11/4/2023 0156 by Rupert Saleh RN  Outcome: Progressing  11/3/2023 2728 by Zafar Obrien RN  Outcome: Progressing     Problem: Safety - Adult  Goal: Free from fall injury  11/4/2023 0156 by Rupert Saleh RN  Outcome: Progressing  11/3/2023 6055 by Zafar Obrien RN  Outcome: Progressing     Problem: Chronic Conditions and Co-morbidities  Goal: Patient's chronic conditions and co-morbidity symptoms are monitored and maintained or improved  11/4/2023 0156 by Rupert Saleh RN  Outcome: Progressing  11/3/2023 2800 by Zafar Obrien RN  Outcome: Progressing     Problem: Infection - Adult  Goal: Absence of infection at discharge  Outcome: Progressing     Problem: Gastrointestinal - Adult  Goal: Maintains or returns to baseline bowel function  Outcome: Progressing     Problem: Skin/Tissue Integrity - Adult  Goal: Skin integrity remains intact  Outcome: Progressing

## 2023-11-04 NOTE — DISCHARGE INSTRUCTIONS
Podiatry Discharge Instructions:  - Apply betadine to your left foot wound and change your bandage at the left foot daily  - Wear your surgical shoe when up an about  - Stay off your left foot as much as possible  - Do not get your left foot wet  - Follow up with Dr. Diana Allison within a week of discharge from the hospital  - Call Dr. Dona Field with any other questions [phone]

## 2023-11-05 LAB
C DIFF TOX GENS STL QL NAA+PROBE: ABNORMAL
GI PATHOGENS PNL STL NAA+PROBE: NORMAL
GLUCOSE BLD-MCNC: 115 MG/DL (ref 70–99)
GLUCOSE BLD-MCNC: 211 MG/DL (ref 70–99)
GLUCOSE BLD-MCNC: 234 MG/DL (ref 70–99)
GLUCOSE BLD-MCNC: 292 MG/DL (ref 70–99)
ORGANISM: ABNORMAL
PERFORMED ON: ABNORMAL

## 2023-11-05 PROCEDURE — 6370000000 HC RX 637 (ALT 250 FOR IP): Performed by: HOSPITALIST

## 2023-11-05 PROCEDURE — G0378 HOSPITAL OBSERVATION PER HR: HCPCS

## 2023-11-05 PROCEDURE — 6370000000 HC RX 637 (ALT 250 FOR IP): Performed by: EMERGENCY MEDICINE

## 2023-11-05 PROCEDURE — 2580000003 HC RX 258: Performed by: HOSPITALIST

## 2023-11-05 PROCEDURE — 6370000000 HC RX 637 (ALT 250 FOR IP): Performed by: STUDENT IN AN ORGANIZED HEALTH CARE EDUCATION/TRAINING PROGRAM

## 2023-11-05 RX ADMIN — PRAZOSIN HYDROCHLORIDE 4 MG: 1 CAPSULE ORAL at 19:50

## 2023-11-05 RX ADMIN — TICAGRELOR 90 MG: 90 TABLET ORAL at 19:51

## 2023-11-05 RX ADMIN — Medication 10 ML: at 19:52

## 2023-11-05 RX ADMIN — INSULIN GLARGINE 12 UNITS: 100 INJECTION, SOLUTION SUBCUTANEOUS at 07:52

## 2023-11-05 RX ADMIN — Medication 125 MG: at 04:09

## 2023-11-05 RX ADMIN — TRAZODONE HYDROCHLORIDE 200 MG: 50 TABLET ORAL at 19:50

## 2023-11-05 RX ADMIN — ASPIRIN 81 MG: 81 TABLET, COATED ORAL at 07:53

## 2023-11-05 RX ADMIN — INSULIN LISPRO 4 UNITS: 100 INJECTION, SOLUTION INTRAVENOUS; SUBCUTANEOUS at 18:24

## 2023-11-05 RX ADMIN — GABAPENTIN 200 MG: 100 CAPSULE ORAL at 07:52

## 2023-11-05 RX ADMIN — GABAPENTIN 200 MG: 100 CAPSULE ORAL at 19:50

## 2023-11-05 RX ADMIN — Medication 125 MG: at 19:51

## 2023-11-05 RX ADMIN — Medication 125 MG: at 15:26

## 2023-11-05 RX ADMIN — INSULIN LISPRO 8 UNITS: 100 INJECTION, SOLUTION INTRAVENOUS; SUBCUTANEOUS at 18:24

## 2023-11-05 RX ADMIN — LOSARTAN POTASSIUM 25 MG: 25 TABLET, FILM COATED ORAL at 07:52

## 2023-11-05 RX ADMIN — INSULIN LISPRO 4 UNITS: 100 INJECTION, SOLUTION INTRAVENOUS; SUBCUTANEOUS at 12:33

## 2023-11-05 RX ADMIN — TICAGRELOR 90 MG: 90 TABLET ORAL at 07:53

## 2023-11-05 RX ADMIN — GABAPENTIN 200 MG: 100 CAPSULE ORAL at 13:23

## 2023-11-05 RX ADMIN — AMLODIPINE BESYLATE 10 MG: 5 TABLET ORAL at 07:53

## 2023-11-05 RX ADMIN — PANTOPRAZOLE SODIUM 40 MG: 40 TABLET, DELAYED RELEASE ORAL at 07:52

## 2023-11-05 RX ADMIN — PRAZOSIN HYDROCHLORIDE 4 MG: 1 CAPSULE ORAL at 07:53

## 2023-11-05 RX ADMIN — Medication 125 MG: at 08:49

## 2023-11-05 RX ADMIN — Medication 10 ML: at 07:53

## 2023-11-05 RX ADMIN — OXYCODONE HYDROCHLORIDE AND ACETAMINOPHEN 1 TABLET: 7.5; 325 TABLET ORAL at 19:51

## 2023-11-05 ASSESSMENT — PAIN SCALES - GENERAL: PAINLEVEL_OUTOF10: 7

## 2023-11-05 NOTE — PLAN OF CARE
Problem: Discharge Planning  Goal: Discharge to home or other facility with appropriate resources  Outcome: Progressing     Problem: Pain  Goal: Verbalizes/displays adequate comfort level or baseline comfort level  Outcome: Progressing     Problem: Safety - Adult  Goal: Free from fall injury  Outcome: Progressing     Problem: Cardiovascular - Adult  Goal: Absence of cardiac dysrhythmias or at baseline  Outcome: Progressing

## 2023-11-06 LAB
GLUCOSE BLD-MCNC: 161 MG/DL (ref 70–99)
GLUCOSE BLD-MCNC: 280 MG/DL (ref 70–99)
GLUCOSE BLD-MCNC: 286 MG/DL (ref 70–99)
GLUCOSE BLD-MCNC: 292 MG/DL (ref 70–99)
PERFORMED ON: ABNORMAL

## 2023-11-06 PROCEDURE — G0378 HOSPITAL OBSERVATION PER HR: HCPCS

## 2023-11-06 PROCEDURE — 2580000003 HC RX 258: Performed by: HOSPITALIST

## 2023-11-06 PROCEDURE — 6370000000 HC RX 637 (ALT 250 FOR IP): Performed by: HOSPITALIST

## 2023-11-06 PROCEDURE — 2500000003 HC RX 250 WO HCPCS: Performed by: HOSPITALIST

## 2023-11-06 PROCEDURE — 6370000000 HC RX 637 (ALT 250 FOR IP): Performed by: EMERGENCY MEDICINE

## 2023-11-06 PROCEDURE — 2700000000 HC OXYGEN THERAPY PER DAY

## 2023-11-06 PROCEDURE — 94761 N-INVAS EAR/PLS OXIMETRY MLT: CPT

## 2023-11-06 PROCEDURE — 6370000000 HC RX 637 (ALT 250 FOR IP): Performed by: STUDENT IN AN ORGANIZED HEALTH CARE EDUCATION/TRAINING PROGRAM

## 2023-11-06 RX ADMIN — TICAGRELOR 90 MG: 90 TABLET ORAL at 19:49

## 2023-11-06 RX ADMIN — INSULIN GLARGINE 12 UNITS: 100 INJECTION, SOLUTION SUBCUTANEOUS at 09:10

## 2023-11-06 RX ADMIN — Medication 10 ML: at 19:49

## 2023-11-06 RX ADMIN — ASPIRIN 81 MG: 81 TABLET, COATED ORAL at 09:11

## 2023-11-06 RX ADMIN — Medication 125 MG: at 03:30

## 2023-11-06 RX ADMIN — AMLODIPINE BESYLATE 10 MG: 5 TABLET ORAL at 09:10

## 2023-11-06 RX ADMIN — Medication 125 MG: at 21:51

## 2023-11-06 RX ADMIN — OXYCODONE HYDROCHLORIDE AND ACETAMINOPHEN 1 TABLET: 7.5; 325 TABLET ORAL at 20:30

## 2023-11-06 RX ADMIN — Medication 125 MG: at 11:51

## 2023-11-06 RX ADMIN — TICAGRELOR 90 MG: 90 TABLET ORAL at 09:11

## 2023-11-06 RX ADMIN — GABAPENTIN 200 MG: 100 CAPSULE ORAL at 19:48

## 2023-11-06 RX ADMIN — Medication 125 MG: at 18:01

## 2023-11-06 RX ADMIN — OXYCODONE HYDROCHLORIDE AND ACETAMINOPHEN 1 TABLET: 7.5; 325 TABLET ORAL at 09:36

## 2023-11-06 RX ADMIN — TRAZODONE HYDROCHLORIDE 200 MG: 50 TABLET ORAL at 19:48

## 2023-11-06 RX ADMIN — ACETAMINOPHEN 650 MG: 325 TABLET ORAL at 19:47

## 2023-11-06 RX ADMIN — PRAZOSIN HYDROCHLORIDE 4 MG: 1 CAPSULE ORAL at 21:51

## 2023-11-06 RX ADMIN — GABAPENTIN 200 MG: 100 CAPSULE ORAL at 15:18

## 2023-11-06 RX ADMIN — Medication 10 ML: at 09:35

## 2023-11-06 RX ADMIN — GABAPENTIN 200 MG: 100 CAPSULE ORAL at 09:11

## 2023-11-06 RX ADMIN — LOSARTAN POTASSIUM 25 MG: 25 TABLET, FILM COATED ORAL at 09:11

## 2023-11-06 RX ADMIN — INSULIN LISPRO 4 UNITS: 100 INJECTION, SOLUTION INTRAVENOUS; SUBCUTANEOUS at 12:41

## 2023-11-06 RX ADMIN — INSULIN LISPRO 8 UNITS: 100 INJECTION, SOLUTION INTRAVENOUS; SUBCUTANEOUS at 12:41

## 2023-11-06 RX ADMIN — PANTOPRAZOLE SODIUM 40 MG: 40 TABLET, DELAYED RELEASE ORAL at 09:11

## 2023-11-06 RX ADMIN — PRAZOSIN HYDROCHLORIDE 4 MG: 1 CAPSULE ORAL at 09:11

## 2023-11-06 ASSESSMENT — PAIN DESCRIPTION - ORIENTATION
ORIENTATION: LOWER
ORIENTATION: MID
ORIENTATION: MID;LOWER

## 2023-11-06 ASSESSMENT — PAIN SCALES - WONG BAKER
WONGBAKER_NUMERICALRESPONSE: 0
WONGBAKER_NUMERICALRESPONSE: 0

## 2023-11-06 ASSESSMENT — PAIN DESCRIPTION - DESCRIPTORS
DESCRIPTORS: ACHING
DESCRIPTORS: ACHING
DESCRIPTORS: ACHING;DISCOMFORT

## 2023-11-06 ASSESSMENT — PAIN SCALES - GENERAL
PAINLEVEL_OUTOF10: 3
PAINLEVEL_OUTOF10: 0
PAINLEVEL_OUTOF10: 7
PAINLEVEL_OUTOF10: 0
PAINLEVEL_OUTOF10: 6

## 2023-11-06 ASSESSMENT — PAIN DESCRIPTION - LOCATION
LOCATION: HEAD
LOCATION: BACK
LOCATION: BACK

## 2023-11-06 ASSESSMENT — PAIN - FUNCTIONAL ASSESSMENT
PAIN_FUNCTIONAL_ASSESSMENT: PREVENTS OR INTERFERES SOME ACTIVE ACTIVITIES AND ADLS
PAIN_FUNCTIONAL_ASSESSMENT: ACTIVITIES ARE NOT PREVENTED
PAIN_FUNCTIONAL_ASSESSMENT: PREVENTS OR INTERFERES WITH MANY ACTIVE NOT PASSIVE ACTIVITIES

## 2023-11-06 NOTE — PLAN OF CARE
Problem: Discharge Planning  Goal: Discharge to home or other facility with appropriate resources  Outcome: Progressing     Problem: Pain  Goal: Verbalizes/displays adequate comfort level or baseline comfort level  Outcome: Progressing     Problem: Safety - Adult  Goal: Free from fall injury  Outcome: Progressing     Problem: Cardiovascular - Adult  Goal: Absence of cardiac dysrhythmias or at baseline  Outcome: Progressing     Problem: Metabolic/Fluid and Electrolytes - Adult  Goal: Glucose maintained within prescribed range  Outcome: Progressing

## 2023-11-07 VITALS
DIASTOLIC BLOOD PRESSURE: 66 MMHG | HEIGHT: 63 IN | SYSTOLIC BLOOD PRESSURE: 163 MMHG | OXYGEN SATURATION: 99 % | HEART RATE: 59 BPM | WEIGHT: 125.8 LBS | RESPIRATION RATE: 18 BRPM | TEMPERATURE: 97.3 F | BODY MASS INDEX: 22.29 KG/M2

## 2023-11-07 PROBLEM — N17.9 ACUTE RENAL FAILURE SUPERIMPOSED ON CHRONIC KIDNEY DISEASE, UNSPECIFIED ACUTE RENAL FAILURE TYPE, UNSPECIFIED CKD STAGE (HCC): Status: ACTIVE | Noted: 2023-11-07

## 2023-11-07 PROBLEM — N18.9 ACUTE RENAL FAILURE SUPERIMPOSED ON CHRONIC KIDNEY DISEASE, UNSPECIFIED ACUTE RENAL FAILURE TYPE, UNSPECIFIED CKD STAGE (HCC): Status: ACTIVE | Noted: 2023-11-07

## 2023-11-07 LAB
ALBUMIN SERPL-MCNC: 2.8 G/DL (ref 3.4–5)
ANION GAP SERPL CALCULATED.3IONS-SCNC: 7 MMOL/L (ref 3–16)
BUN SERPL-MCNC: 38 MG/DL (ref 7–20)
CALCIUM SERPL-MCNC: 8.5 MG/DL (ref 8.3–10.6)
CHLORIDE SERPL-SCNC: 110 MMOL/L (ref 99–110)
CO2 SERPL-SCNC: 21 MMOL/L (ref 21–32)
CREAT SERPL-MCNC: 2 MG/DL (ref 0.6–1.2)
DEPRECATED RDW RBC AUTO: 13.8 % (ref 12.4–15.4)
GFR SERPLBLD CREATININE-BSD FMLA CKD-EPI: 27 ML/MIN/{1.73_M2}
GLUCOSE BLD-MCNC: 188 MG/DL (ref 70–99)
GLUCOSE BLD-MCNC: 196 MG/DL (ref 70–99)
GLUCOSE BLD-MCNC: 302 MG/DL (ref 70–99)
GLUCOSE BLD-MCNC: 40 MG/DL (ref 70–99)
GLUCOSE BLD-MCNC: 43 MG/DL (ref 70–99)
GLUCOSE BLD-MCNC: 91 MG/DL (ref 70–99)
GLUCOSE SERPL-MCNC: 351 MG/DL (ref 70–99)
HCT VFR BLD AUTO: 26.4 % (ref 36–48)
HGB BLD-MCNC: 8.3 G/DL (ref 12–16)
MCH RBC QN AUTO: 27 PG (ref 26–34)
MCHC RBC AUTO-ENTMCNC: 31.5 G/DL (ref 31–36)
MCV RBC AUTO: 85.8 FL (ref 80–100)
PERFORMED ON: ABNORMAL
PERFORMED ON: NORMAL
PHOSPHATE SERPL-MCNC: 3.8 MG/DL (ref 2.5–4.9)
PLATELET # BLD AUTO: 162 K/UL (ref 135–450)
PMV BLD AUTO: 9.2 FL (ref 5–10.5)
POTASSIUM SERPL-SCNC: 5.7 MMOL/L (ref 3.5–5.1)
RBC # BLD AUTO: 3.08 M/UL (ref 4–5.2)
SODIUM SERPL-SCNC: 138 MMOL/L (ref 136–145)
WBC # BLD AUTO: 5 K/UL (ref 4–11)

## 2023-11-07 PROCEDURE — G0378 HOSPITAL OBSERVATION PER HR: HCPCS

## 2023-11-07 PROCEDURE — 36415 COLL VENOUS BLD VENIPUNCTURE: CPT

## 2023-11-07 PROCEDURE — 97116 GAIT TRAINING THERAPY: CPT

## 2023-11-07 PROCEDURE — 2580000003 HC RX 258: Performed by: HOSPITALIST

## 2023-11-07 PROCEDURE — 97535 SELF CARE MNGMENT TRAINING: CPT

## 2023-11-07 PROCEDURE — 6370000000 HC RX 637 (ALT 250 FOR IP): Performed by: HOSPITALIST

## 2023-11-07 PROCEDURE — 6370000000 HC RX 637 (ALT 250 FOR IP): Performed by: STUDENT IN AN ORGANIZED HEALTH CARE EDUCATION/TRAINING PROGRAM

## 2023-11-07 PROCEDURE — 1200000000 HC SEMI PRIVATE

## 2023-11-07 PROCEDURE — 97162 PT EVAL MOD COMPLEX 30 MIN: CPT

## 2023-11-07 PROCEDURE — 97166 OT EVAL MOD COMPLEX 45 MIN: CPT

## 2023-11-07 PROCEDURE — 97530 THERAPEUTIC ACTIVITIES: CPT

## 2023-11-07 PROCEDURE — 80069 RENAL FUNCTION PANEL: CPT

## 2023-11-07 PROCEDURE — 85027 COMPLETE CBC AUTOMATED: CPT

## 2023-11-07 RX ORDER — PRAZOSIN HYDROCHLORIDE 2 MG/1
4 CAPSULE ORAL 2 TIMES DAILY
Qty: 120 CAPSULE | Refills: 0 | Status: SHIPPED | OUTPATIENT
Start: 2023-11-07 | End: 2023-12-07

## 2023-11-07 RX ORDER — VANCOMYCIN HYDROCHLORIDE 50 MG/ML
125 KIT ORAL 4 TIMES DAILY
Qty: 140 ML | Refills: 0 | Status: SHIPPED | OUTPATIENT
Start: 2023-11-07 | End: 2023-11-19

## 2023-11-07 RX ORDER — AMLODIPINE BESYLATE 10 MG/1
10 TABLET ORAL DAILY
Qty: 30 TABLET | Refills: 0 | Status: SHIPPED | OUTPATIENT
Start: 2023-11-08 | End: 2023-12-08

## 2023-11-07 RX ORDER — LOSARTAN POTASSIUM 25 MG/1
25 TABLET ORAL DAILY
Qty: 30 TABLET | Refills: 0 | Status: SHIPPED | OUTPATIENT
Start: 2023-11-08 | End: 2023-12-08

## 2023-11-07 RX ADMIN — TICAGRELOR 90 MG: 90 TABLET ORAL at 08:43

## 2023-11-07 RX ADMIN — INSULIN LISPRO 12 UNITS: 100 INJECTION, SOLUTION INTRAVENOUS; SUBCUTANEOUS at 08:44

## 2023-11-07 RX ADMIN — Medication 125 MG: at 03:19

## 2023-11-07 RX ADMIN — Medication 125 MG: at 14:22

## 2023-11-07 RX ADMIN — DEXTROSE MONOHYDRATE 250 ML: 100 INJECTION, SOLUTION INTRAVENOUS at 12:18

## 2023-11-07 RX ADMIN — ASPIRIN 81 MG: 81 TABLET, COATED ORAL at 08:43

## 2023-11-07 RX ADMIN — Medication 125 MG: at 08:44

## 2023-11-07 RX ADMIN — INSULIN LISPRO 4 UNITS: 100 INJECTION, SOLUTION INTRAVENOUS; SUBCUTANEOUS at 08:47

## 2023-11-07 RX ADMIN — Medication 10 ML: at 08:49

## 2023-11-07 RX ADMIN — GABAPENTIN 200 MG: 100 CAPSULE ORAL at 14:22

## 2023-11-07 RX ADMIN — GABAPENTIN 200 MG: 100 CAPSULE ORAL at 08:43

## 2023-11-07 RX ADMIN — PANTOPRAZOLE SODIUM 40 MG: 40 TABLET, DELAYED RELEASE ORAL at 08:43

## 2023-11-07 RX ADMIN — INSULIN GLARGINE 12 UNITS: 100 INJECTION, SOLUTION SUBCUTANEOUS at 08:45

## 2023-11-07 RX ADMIN — OXYCODONE HYDROCHLORIDE AND ACETAMINOPHEN 1 TABLET: 7.5; 325 TABLET ORAL at 14:23

## 2023-11-07 ASSESSMENT — PAIN SCALES - GENERAL: PAINLEVEL_OUTOF10: 8

## 2023-11-07 ASSESSMENT — PAIN DESCRIPTION - LOCATION: LOCATION: BACK

## 2023-11-07 ASSESSMENT — PAIN DESCRIPTION - ORIENTATION: ORIENTATION: MID

## 2023-11-07 ASSESSMENT — PAIN DESCRIPTION - DESCRIPTORS: DESCRIPTORS: ACHING

## 2023-11-07 NOTE — PLAN OF CARE
Problem: Discharge Planning  Goal: Discharge to home or other facility with appropriate resources  11/7/2023 1000 by Rhys Zambrano RN  Outcome: Progressing  Flowsheets (Taken 11/7/2023 1000)  Discharge to home or other facility with appropriate resources:   Identify barriers to discharge with patient and caregiver   Arrange for needed discharge resources and transportation as appropriate  11/6/2023 2148 by Ofelia Ram RN  Outcome: Progressing  Flowsheets (Taken 11/6/2023 1946)  Discharge to home or other facility with appropriate resources: Identify barriers to discharge with patient and caregiver     Problem: Pain  Goal: Verbalizes/displays adequate comfort level or baseline comfort level  11/7/2023 1000 by Rhys Zambrano RN  Outcome: Progressing  Flowsheets  Taken 11/7/2023 1000 by Rhys Zambrano RN  Verbalizes/displays adequate comfort level or baseline comfort level:   Encourage patient to monitor pain and request assistance   Assess pain using appropriate pain scale  Taken 11/7/2023 0318 by Ofelia Ram RN  Verbalizes/displays adequate comfort level or baseline comfort level: Encourage patient to monitor pain and request assistance  Note: No pain reported by patient. 11/6/2023 2148 by Ofelia Ram RN  Outcome: Progressing  Flowsheets (Taken 11/6/2023 1946)  Verbalizes/displays adequate comfort level or baseline comfort level: Encourage patient to monitor pain and request assistance     Problem: Safety - Adult  Goal: Free from fall injury  Outcome: Progressing  Flowsheets (Taken 11/7/2023 1000)  Free From Fall Injury: Instruct family/caregiver on patient safety  Note: Patient has remained free from falls this shift with standard safety measures in place.       Problem: Chronic Conditions and Co-morbidities  Goal: Patient's chronic conditions and co-morbidity symptoms are monitored and maintained or improved  Outcome: Progressing  Flowsheets (Taken 11/7/2023 1000)  Care Plan - Patient's

## 2023-11-07 NOTE — PLAN OF CARE
Problem: Discharge Planning  Goal: Discharge to home or other facility with appropriate resources  Outcome: Progressing  Flowsheets (Taken 11/6/2023 1946)  Discharge to home or other facility with appropriate resources: Identify barriers to discharge with patient and caregiver     Problem: Pain  Goal: Verbalizes/displays adequate comfort level or baseline comfort level  Outcome: Progressing  Flowsheets (Taken 11/6/2023 1946)  Verbalizes/displays adequate comfort level or baseline comfort level: Encourage patient to monitor pain and request assistance

## 2023-11-08 NOTE — DISCHARGE SUMMARY
Hospital Medicine Discharge Summary    Patient: Kalani Sands   : 1960     Admit Date: 2023   Discharge Date: 2023    Disposition:  [x]Home   []HHC  []SNF  []Acute Rehab  []LTAC  []Hospice  Code status:  [x]Full  []DNR/CCA  []Limited (DNR/CCA with Do Not Intubate)  []DNRCC  Condition at Discharge: Stable  Primary Care Provider: SARTHAK Evans NP    Admitting Provider: Miguel Mccarthy MD  Discharge Provider: Miguel Mccarthy MD     Discharge Diagnoses: Active Hospital Problems    Diagnosis     Acute renal failure superimposed on chronic kidney disease, unspecified acute renal failure type, unspecified CKD stage (HCC) [N17.9, N18.9]     Hypertensive urgency [I16.0]        Presenting Admission History:      Kalani Sands is a 58 y.o. female with pmh of  Colon cancer s/p right hemicolectomy, CAD, PVD, stage 3a CKD, IDDM2, who presents with Hypertensive urgency\"        Assessment/Plan:      Current Principal Problem:  Hypertensive urgency        This 27-year-old female admitted with hypertensive urgency continue with home medication currently on nondistended p.o. daily restarted on losartan which was on hold due to acute kidney injury on recent hospitalization 2023, continue with as needed hydralazine. Frontal headaches suspect tension type headache CT of the head negative for intracranial pathology today patient denies any headache. Stage IIIa chronic kidney disease solitary kidney(history of Wilm's Tumor as child) Cr 1.6. approximates baseline (Cr is labile). Monitor closely avoid nephrotoxic agent. Diabetes mellitus type 2 uncontrolled hemoglobin A1c= 8.8 on 2023     Chronic heart failure with reduced EF continue with home medication. Coronary artery disease continue with DAPT. Colon cancer status post right hemicolectomy 3/2023. Chronic diarrhea since March GI PCR panel and C. difficile testing ordered per oncology and positive started on p.o.

## 2023-11-22 DIAGNOSIS — E78.5 DYSLIPIDEMIA ASSOCIATED WITH TYPE 2 DIABETES MELLITUS (HCC): ICD-10-CM

## 2023-11-22 DIAGNOSIS — E11.65 TYPE 2 DIABETES MELLITUS WITH HYPERGLYCEMIA, WITH LONG-TERM CURRENT USE OF INSULIN (HCC): ICD-10-CM

## 2023-11-22 DIAGNOSIS — E11.49 DIABETIC NEUROPATHY WITH NEUROLOGIC COMPLICATION (HCC): ICD-10-CM

## 2023-11-22 DIAGNOSIS — Z79.4 TYPE 2 DIABETES MELLITUS WITH HYPERGLYCEMIA, WITH LONG-TERM CURRENT USE OF INSULIN (HCC): ICD-10-CM

## 2023-11-22 DIAGNOSIS — E11.42 DIABETIC POLYNEUROPATHY ASSOCIATED WITH TYPE 2 DIABETES MELLITUS (HCC): ICD-10-CM

## 2023-11-22 DIAGNOSIS — E11.69 DYSLIPIDEMIA ASSOCIATED WITH TYPE 2 DIABETES MELLITUS (HCC): ICD-10-CM

## 2023-11-22 DIAGNOSIS — E11.59 TYPE 2 DIABETES MELLITUS WITH VASCULAR DISEASE (HCC): ICD-10-CM

## 2023-11-22 DIAGNOSIS — E11.29 TYPE 2 DIABETES MELLITUS WITH OTHER DIABETIC KIDNEY COMPLICATION (HCC): ICD-10-CM

## 2023-11-22 DIAGNOSIS — E11.40 DIABETIC NEUROPATHY WITH NEUROLOGIC COMPLICATION (HCC): ICD-10-CM

## 2023-11-24 RX ORDER — GLIMEPIRIDE 4 MG/1
4 TABLET ORAL
Qty: 90 TABLET | Refills: 1 | Status: SHIPPED | OUTPATIENT
Start: 2023-11-24

## 2023-11-24 NOTE — TELEPHONE ENCOUNTER
Medication:   Requested Prescriptions     Pending Prescriptions Disp Refills    glimepiride (AMARYL) 4 MG tablet [Pharmacy Med Name: GLIMEPIRIDE 4 MG TABLET] 90 tablet 1     Sig: TAKE 1 TABLET BY MOUTH EVERY DAY BEFORE BREAKFAST       Last Filled:      Patient Phone Number: [phone] (home)     Last appt: 11/2/2023   Next appt: 1/11/2024    Last Labs DM:   Lab Results   Component Value Date/Time    LABA1C 8.8 09/21/2023 12:41 PM

## 2023-11-25 ENCOUNTER — TELEPHONE (OUTPATIENT)
Dept: ENDOCRINOLOGY | Age: 63
End: 2023-11-25

## 2023-11-27 ENCOUNTER — APPOINTMENT (OUTPATIENT)
Dept: GENERAL RADIOLOGY | Age: 63
End: 2023-11-27
Payer: MEDICAID

## 2023-11-27 ENCOUNTER — TELEPHONE (OUTPATIENT)
Dept: ENDOCRINOLOGY | Age: 63
End: 2023-11-27

## 2023-11-27 ENCOUNTER — HOSPITAL ENCOUNTER (OUTPATIENT)
Age: 63
Setting detail: OBSERVATION
Discharge: HOME | End: 2023-12-01
Attending: EMERGENCY MEDICINE
Payer: MEDICAID

## 2023-11-27 DIAGNOSIS — D64.9 CHRONIC ANEMIA: ICD-10-CM

## 2023-11-27 DIAGNOSIS — R07.2 PRECORDIAL PAIN: Primary | ICD-10-CM

## 2023-11-27 DIAGNOSIS — N18.9 CHRONIC KIDNEY DISEASE, UNSPECIFIED CKD STAGE: ICD-10-CM

## 2023-11-27 DIAGNOSIS — E87.5 HYPERKALEMIA: ICD-10-CM

## 2023-11-27 PROBLEM — R07.89 ATYPICAL CHEST PAIN: Status: ACTIVE | Noted: 2023-11-27

## 2023-11-27 LAB
ALBUMIN SERPL-MCNC: 3.1 G/DL (ref 3.4–5)
ALBUMIN/GLOB SERPL: 0.7 {RATIO} (ref 1.1–2.2)
ALP SERPL-CCNC: 358 U/L (ref 40–129)
ALT SERPL-CCNC: 66 U/L (ref 10–40)
ANION GAP SERPL CALCULATED.3IONS-SCNC: 8 MMOL/L (ref 3–16)
AST SERPL-CCNC: 73 U/L (ref 15–37)
BASOPHILS # BLD: 0.1 K/UL (ref 0–0.2)
BASOPHILS NFR BLD: 0.9 %
BILIRUB DIRECT SERPL-MCNC: <0.2 MG/DL (ref 0–0.3)
BILIRUB SERPL-MCNC: <0.2 MG/DL (ref 0–1)
BUN SERPL-MCNC: 30 MG/DL (ref 7–20)
CALCIUM SERPL-MCNC: 9.7 MG/DL (ref 8.3–10.6)
CHLORIDE SERPL-SCNC: 108 MMOL/L (ref 99–110)
CO2 SERPL-SCNC: 23 MMOL/L (ref 21–32)
CREAT SERPL-MCNC: 1.8 MG/DL (ref 0.6–1.2)
DEPRECATED RDW RBC AUTO: 14.3 % (ref 12.4–15.4)
EOSINOPHIL # BLD: 0.1 K/UL (ref 0–0.6)
EOSINOPHIL NFR BLD: 0.9 %
GFR SERPLBLD CREATININE-BSD FMLA CKD-EPI: 31 ML/MIN/{1.73_M2}
GLUCOSE BLD-MCNC: 109 MG/DL (ref 70–99)
GLUCOSE BLD-MCNC: 266 MG/DL (ref 70–99)
GLUCOSE BLD-MCNC: 273 MG/DL (ref 70–99)
GLUCOSE SERPL-MCNC: 176 MG/DL (ref 70–99)
HCT VFR BLD AUTO: 30.9 % (ref 36–48)
HGB BLD-MCNC: 9.7 G/DL (ref 12–16)
LYMPHOCYTES # BLD: 1.7 K/UL (ref 1–5.1)
LYMPHOCYTES NFR BLD: 20 %
MCH RBC QN AUTO: 27.1 PG (ref 26–34)
MCHC RBC AUTO-ENTMCNC: 31.5 G/DL (ref 31–36)
MCV RBC AUTO: 85.9 FL (ref 80–100)
MONOCYTES # BLD: 0.6 K/UL (ref 0–1.3)
MONOCYTES NFR BLD: 7.5 %
NEUTROPHILS # BLD: 6 K/UL (ref 1.7–7.7)
NEUTROPHILS NFR BLD: 70.7 %
PERFORMED ON: ABNORMAL
PLATELET # BLD AUTO: 189 K/UL (ref 135–450)
PMV BLD AUTO: 9.3 FL (ref 5–10.5)
POTASSIUM SERPL-SCNC: 5.4 MMOL/L (ref 3.5–5.1)
POTASSIUM SERPL-SCNC: 5.9 MMOL/L (ref 3.5–5.1)
PROT SERPL-MCNC: 7.5 G/DL (ref 6.4–8.2)
RBC # BLD AUTO: 3.59 M/UL (ref 4–5.2)
SODIUM SERPL-SCNC: 139 MMOL/L (ref 136–145)
TROPONIN, HIGH SENSITIVITY: 57 NG/L (ref 0–14)
TROPONIN, HIGH SENSITIVITY: 66 NG/L (ref 0–14)
TROPONIN, HIGH SENSITIVITY: 68 NG/L (ref 0–14)
WBC # BLD AUTO: 8.4 K/UL (ref 4–11)

## 2023-11-27 PROCEDURE — 2580000003 HC RX 258: Performed by: EMERGENCY MEDICINE

## 2023-11-27 PROCEDURE — 2580000003 HC RX 258: Performed by: STUDENT IN AN ORGANIZED HEALTH CARE EDUCATION/TRAINING PROGRAM

## 2023-11-27 PROCEDURE — G0378 HOSPITAL OBSERVATION PER HR: HCPCS

## 2023-11-27 PROCEDURE — 80061 LIPID PANEL: CPT

## 2023-11-27 PROCEDURE — 93005 ELECTROCARDIOGRAM TRACING: CPT | Performed by: EMERGENCY MEDICINE

## 2023-11-27 PROCEDURE — 6370000000 HC RX 637 (ALT 250 FOR IP): Performed by: STUDENT IN AN ORGANIZED HEALTH CARE EDUCATION/TRAINING PROGRAM

## 2023-11-27 PROCEDURE — 84484 ASSAY OF TROPONIN QUANT: CPT

## 2023-11-27 PROCEDURE — 2500000003 HC RX 250 WO HCPCS: Performed by: STUDENT IN AN ORGANIZED HEALTH CARE EDUCATION/TRAINING PROGRAM

## 2023-11-27 PROCEDURE — 82977 ASSAY OF GGT: CPT

## 2023-11-27 PROCEDURE — 82248 BILIRUBIN DIRECT: CPT

## 2023-11-27 PROCEDURE — 99285 EMERGENCY DEPT VISIT HI MDM: CPT

## 2023-11-27 PROCEDURE — 6370000000 HC RX 637 (ALT 250 FOR IP): Performed by: EMERGENCY MEDICINE

## 2023-11-27 PROCEDURE — 6360000002 HC RX W HCPCS: Performed by: STUDENT IN AN ORGANIZED HEALTH CARE EDUCATION/TRAINING PROGRAM

## 2023-11-27 PROCEDURE — 71046 X-RAY EXAM CHEST 2 VIEWS: CPT

## 2023-11-27 PROCEDURE — 84132 ASSAY OF SERUM POTASSIUM: CPT

## 2023-11-27 PROCEDURE — 83036 HEMOGLOBIN GLYCOSYLATED A1C: CPT

## 2023-11-27 PROCEDURE — 85025 COMPLETE CBC W/AUTO DIFF WBC: CPT

## 2023-11-27 PROCEDURE — 96372 THER/PROPH/DIAG INJ SC/IM: CPT

## 2023-11-27 PROCEDURE — 96365 THER/PROPH/DIAG IV INF INIT: CPT

## 2023-11-27 PROCEDURE — 36415 COLL VENOUS BLD VENIPUNCTURE: CPT

## 2023-11-27 PROCEDURE — 80053 COMPREHEN METABOLIC PANEL: CPT

## 2023-11-27 PROCEDURE — 96361 HYDRATE IV INFUSION ADD-ON: CPT

## 2023-11-27 RX ORDER — ONDANSETRON 2 MG/ML
4 INJECTION INTRAMUSCULAR; INTRAVENOUS EVERY 6 HOURS PRN
Status: DISCONTINUED | OUTPATIENT
Start: 2023-11-27 | End: 2023-12-01 | Stop reason: HOSPADM

## 2023-11-27 RX ORDER — GLUCAGON 1 MG/ML
1 KIT INJECTION PRN
Status: DISCONTINUED | OUTPATIENT
Start: 2023-11-27 | End: 2023-12-01 | Stop reason: HOSPADM

## 2023-11-27 RX ORDER — ACETAMINOPHEN 325 MG/1
650 TABLET ORAL EVERY 6 HOURS PRN
Status: DISCONTINUED | OUTPATIENT
Start: 2023-11-27 | End: 2023-12-01 | Stop reason: HOSPADM

## 2023-11-27 RX ORDER — CALCIUM CITRATE/VITAMIN D3 200MG-6.25
4 TABLET ORAL DAILY
Qty: 200 EACH | Refills: 3 | Status: SHIPPED | OUTPATIENT
Start: 2023-11-27

## 2023-11-27 RX ORDER — DEXTROSE MONOHYDRATE 100 MG/ML
INJECTION, SOLUTION INTRAVENOUS CONTINUOUS PRN
Status: DISCONTINUED | OUTPATIENT
Start: 2023-11-27 | End: 2023-12-01 | Stop reason: HOSPADM

## 2023-11-27 RX ORDER — SODIUM CHLORIDE 0.9 % (FLUSH) 0.9 %
5-40 SYRINGE (ML) INJECTION PRN
Status: DISCONTINUED | OUTPATIENT
Start: 2023-11-27 | End: 2023-12-01 | Stop reason: HOSPADM

## 2023-11-27 RX ORDER — 0.9 % SODIUM CHLORIDE 0.9 %
1000 INTRAVENOUS SOLUTION INTRAVENOUS ONCE
Status: COMPLETED | OUTPATIENT
Start: 2023-11-27 | End: 2023-11-27

## 2023-11-27 RX ORDER — TRAZODONE HYDROCHLORIDE 50 MG/1
200 TABLET ORAL NIGHTLY
Status: DISCONTINUED | OUTPATIENT
Start: 2023-11-27 | End: 2023-12-01 | Stop reason: HOSPADM

## 2023-11-27 RX ORDER — BLOOD PRESSURE TEST KIT
4 KIT MISCELLANEOUS DAILY
Qty: 200 EACH | Refills: 11 | Status: SHIPPED | OUTPATIENT
Start: 2023-11-27

## 2023-11-27 RX ORDER — DEXTROSE MONOHYDRATE 100 MG/ML
INJECTION, SOLUTION INTRAVENOUS CONTINUOUS PRN
Status: DISCONTINUED | OUTPATIENT
Start: 2023-11-27 | End: 2023-11-29 | Stop reason: SDUPTHER

## 2023-11-27 RX ORDER — OXYCODONE AND ACETAMINOPHEN 7.5; 325 MG/1; MG/1
1 TABLET ORAL EVERY 4 HOURS PRN
Status: DISCONTINUED | OUTPATIENT
Start: 2023-11-27 | End: 2023-12-01 | Stop reason: HOSPADM

## 2023-11-27 RX ORDER — ONDANSETRON 4 MG/1
4 TABLET, ORALLY DISINTEGRATING ORAL EVERY 8 HOURS PRN
Status: DISCONTINUED | OUTPATIENT
Start: 2023-11-27 | End: 2023-12-01 | Stop reason: HOSPADM

## 2023-11-27 RX ORDER — POLYETHYLENE GLYCOL 3350 17 G/17G
17 POWDER, FOR SOLUTION ORAL DAILY PRN
Status: DISCONTINUED | OUTPATIENT
Start: 2023-11-27 | End: 2023-12-01 | Stop reason: HOSPADM

## 2023-11-27 RX ORDER — CALCIUM GLUCONATE 20 MG/ML
1000 INJECTION, SOLUTION INTRAVENOUS ONCE
Status: COMPLETED | OUTPATIENT
Start: 2023-11-27 | End: 2023-11-27

## 2023-11-27 RX ORDER — INSULIN LISPRO 100 [IU]/ML
0-4 INJECTION, SOLUTION INTRAVENOUS; SUBCUTANEOUS NIGHTLY
Status: DISCONTINUED | OUTPATIENT
Start: 2023-11-27 | End: 2023-11-28

## 2023-11-27 RX ORDER — SODIUM CHLORIDE 9 MG/ML
INJECTION, SOLUTION INTRAVENOUS PRN
Status: DISCONTINUED | OUTPATIENT
Start: 2023-11-27 | End: 2023-12-01 | Stop reason: HOSPADM

## 2023-11-27 RX ORDER — ASPIRIN 81 MG/1
81 TABLET, CHEWABLE ORAL DAILY
Status: DISCONTINUED | OUTPATIENT
Start: 2023-11-27 | End: 2023-12-01 | Stop reason: HOSPADM

## 2023-11-27 RX ORDER — INSULIN LISPRO 100 [IU]/ML
0-4 INJECTION, SOLUTION INTRAVENOUS; SUBCUTANEOUS
Status: DISCONTINUED | OUTPATIENT
Start: 2023-11-27 | End: 2023-11-28

## 2023-11-27 RX ORDER — ENOXAPARIN SODIUM 100 MG/ML
30 INJECTION SUBCUTANEOUS DAILY
Status: DISCONTINUED | OUTPATIENT
Start: 2023-11-27 | End: 2023-11-28 | Stop reason: ALTCHOICE

## 2023-11-27 RX ORDER — GLUCAGON 1 MG/ML
1 KIT INJECTION PRN
Status: DISCONTINUED | OUTPATIENT
Start: 2023-11-27 | End: 2023-11-29 | Stop reason: SDUPTHER

## 2023-11-27 RX ORDER — ATORVASTATIN CALCIUM 40 MG/1
40 TABLET, FILM COATED ORAL NIGHTLY
Status: DISCONTINUED | OUTPATIENT
Start: 2023-11-27 | End: 2023-12-01 | Stop reason: HOSPADM

## 2023-11-27 RX ORDER — ACETAMINOPHEN 650 MG/1
650 SUPPOSITORY RECTAL EVERY 6 HOURS PRN
Status: DISCONTINUED | OUTPATIENT
Start: 2023-11-27 | End: 2023-12-01 | Stop reason: HOSPADM

## 2023-11-27 RX ORDER — PRAZOSIN HYDROCHLORIDE 1 MG/1
4 CAPSULE ORAL 2 TIMES DAILY
Status: DISCONTINUED | OUTPATIENT
Start: 2023-11-27 | End: 2023-12-01 | Stop reason: HOSPADM

## 2023-11-27 RX ORDER — PANTOPRAZOLE SODIUM 40 MG/1
40 TABLET, DELAYED RELEASE ORAL DAILY
Status: DISCONTINUED | OUTPATIENT
Start: 2023-11-27 | End: 2023-12-01 | Stop reason: HOSPADM

## 2023-11-27 RX ORDER — AMLODIPINE BESYLATE 5 MG/1
10 TABLET ORAL DAILY
Status: DISCONTINUED | OUTPATIENT
Start: 2023-11-27 | End: 2023-12-01 | Stop reason: HOSPADM

## 2023-11-27 RX ORDER — SODIUM CHLORIDE 0.9 % (FLUSH) 0.9 %
5-40 SYRINGE (ML) INJECTION EVERY 12 HOURS SCHEDULED
Status: DISCONTINUED | OUTPATIENT
Start: 2023-11-27 | End: 2023-12-01 | Stop reason: HOSPADM

## 2023-11-27 RX ADMIN — INSULIN HUMAN 10 UNITS: 100 INJECTION, SOLUTION PARENTERAL at 20:50

## 2023-11-27 RX ADMIN — DEXTROSE MONOHYDRATE 250 ML: 100 INJECTION, SOLUTION INTRAVENOUS at 20:54

## 2023-11-27 RX ADMIN — ASPIRIN 81 MG: 81 TABLET, CHEWABLE ORAL at 19:23

## 2023-11-27 RX ADMIN — PRAZOSIN HYDROCHLORIDE 4 MG: 1 CAPSULE ORAL at 19:23

## 2023-11-27 RX ADMIN — TICAGRELOR 90 MG: 90 TABLET ORAL at 19:24

## 2023-11-27 RX ADMIN — NITROGLYCERIN 0.5 INCH: 20 OINTMENT TOPICAL at 13:49

## 2023-11-27 RX ADMIN — ATORVASTATIN CALCIUM 40 MG: 40 TABLET, FILM COATED ORAL at 19:29

## 2023-11-27 RX ADMIN — ENOXAPARIN SODIUM 30 MG: 100 INJECTION SUBCUTANEOUS at 19:23

## 2023-11-27 RX ADMIN — Medication 10 ML: at 19:24

## 2023-11-27 RX ADMIN — SODIUM CHLORIDE 1000 ML: 9 INJECTION, SOLUTION INTRAVENOUS at 14:51

## 2023-11-27 RX ADMIN — CALCIUM GLUCONATE 1000 MG: 20 INJECTION, SOLUTION INTRAVENOUS at 19:41

## 2023-11-27 RX ADMIN — AMLODIPINE BESYLATE 10 MG: 5 TABLET ORAL at 19:23

## 2023-11-27 RX ADMIN — PANTOPRAZOLE SODIUM 40 MG: 40 TABLET, DELAYED RELEASE ORAL at 19:23

## 2023-11-27 RX ADMIN — TRAZODONE HYDROCHLORIDE 200 MG: 50 TABLET ORAL at 19:23

## 2023-11-27 RX ADMIN — OXYCODONE AND ACETAMINOPHEN 1 TABLET: 7.5; 325 TABLET ORAL at 19:30

## 2023-11-27 ASSESSMENT — PAIN - FUNCTIONAL ASSESSMENT
PAIN_FUNCTIONAL_ASSESSMENT: 0-10
PAIN_FUNCTIONAL_ASSESSMENT: 0-10

## 2023-11-27 ASSESSMENT — PAIN SCALES - GENERAL
PAINLEVEL_OUTOF10: 7
PAINLEVEL_OUTOF10: 7

## 2023-11-27 ASSESSMENT — PAIN DESCRIPTION - LOCATION: LOCATION: CHEST

## 2023-11-27 NOTE — ED PROVIDER NOTES
Emergency Department Provider Note  Location: [facility]  ED  11/27/2023     Patient Identification  Lorene Wasserman is a 58 y.o. female    Chief Complaint  Chest Pain (Per EMS report pt c/o chest pain that started yesterday. Pt given 324 mg of aspirin in route to the ED )          HPI  (History provided by patient)  Patient is 68-year-old female with a history of bipolar hypertension hyperlipidemia, Wilms tumor status post nephrectomy solitary kidney, CAD on Brilinta presents with substernal chest pain and pressure since yesterday. She reports that she feels intermittently weak in her knees. She reports that she did fall once she denies hitting her head or any loss of consciousness. Able to get back up and ambulate immediately after. Developed a pressure sensation center of her chest does not radiate and is not particularly exertional.  No pleurisy or shortness of breath no associated nausea or diaphoresis. Called EMS and was given 324 aspirin en route. Denies any back pain numbness or weakness. Nursing Notes were all reviewed and agreed with, or any disagreements were addressed in the HPI:  Allergies:    Allergies   Allergen Reactions    Morphine Anaphylaxis and Hives     feels like throat is closing    Penicillins Hives and Swelling    Codeine Hives and Rash    Penicillin G Rash       Past medical history:  has a past medical history of Abnormal brain MRI (7/20/2017), Acute bilateral low back pain without sciatica (11/2/2016), SHARRON (acute kidney injury) ([address]) (7/5/2017), Arthritis, Bipolar disorder ([address]) (10/18/2008), CAD (coronary artery disease), Cancer ([address]) (2015), Carotid stenosis, bilateral:<50%:per US 7/2016 (7/15/2016), Carpal tunnel syndrome (10/18/2008), Cervical cancer screening (2014), Coronary artery disease of native artery of native heart with stable angina pectoris ([address]) (6/9/2020), DDD (degenerative disc disease), lumbar (7/18/2018), Depression, Depression/anxiety Labs  Results for orders placed or performed during the hospital encounter of 11/27/23   CBC with Auto Differential   Result Value Ref Range    WBC 8.4 4.0 - 11.0 K/uL    RBC 3.59 (L) 4.00 - 5.20 M/uL    Hemoglobin 9.7 (L) 12.0 - 16.0 g/dL    Hematocrit 30.9 (L) 36.0 - 48.0 %    MCV 85.9 80.0 - 100.0 fL    MCH 27.1 26.0 - 34.0 pg    MCHC 31.5 31.0 - 36.0 g/dL    RDW 14.3 12.4 - 15.4 %    Platelets 739 830 - 351 K/uL    MPV 9.3 5.0 - 10.5 fL    Neutrophils % 70.7 %    Lymphocytes % 20.0 %    Monocytes % 7.5 %    Eosinophils % 0.9 %    Basophils % 0.9 %    Neutrophils Absolute 6.0 1.7 - 7.7 K/uL    Lymphocytes Absolute 1.7 1.0 - 5.1 K/uL    Monocytes Absolute 0.6 0.0 - 1.3 K/uL    Eosinophils Absolute 0.1 0.0 - 0.6 K/uL    Basophils Absolute 0.1 0.0 - 0.2 K/uL   Comprehensive Metabolic Panel w/ Reflex to MG   Result Value Ref Range    Sodium 139 136 - 145 mmol/L    Potassium reflex Magnesium 5.4 (H) 3.5 - 5.1 mmol/L    Chloride 108 99 - 110 mmol/L    CO2 23 21 - 32 mmol/L    Anion Gap 8 3 - 16    Glucose 176 (H) 70 - 99 mg/dL    BUN 30 (H) 7 - 20 mg/dL    Creatinine 1.8 (H) 0.6 - 1.2 mg/dL    Est, Glom Filt Rate 31 (A) >60    Calcium 9.7 8.3 - 10.6 mg/dL    Total Protein 7.5 6.4 - 8.2 g/dL    Albumin 3.1 (L) 3.4 - 5.0 g/dL    Albumin/Globulin Ratio 0.7 (L) 1.1 - 2.2    Total Bilirubin <0.2 0.0 - 1.0 mg/dL    Alkaline Phosphatase 358 (H) 40 - 129 U/L    ALT 66 (H) 10 - 40 U/L    AST 73 (H) 15 - 37 U/L   Troponin   Result Value Ref Range    Troponin, High Sensitivity 68 (H) 0 - 14 ng/L   Potassium   Result Value Ref Range    Potassium 5.9 (H) 3.5 - 5.1 mmol/L   Troponin   Result Value Ref Range    Troponin, High Sensitivity 66 (H) 0 - 14 ng/L   EKG 12 Lead   Result Value Ref Range    Ventricular Rate 67 BPM    Atrial Rate 67 BPM    P-R Interval 152 ms    QRS Duration 80 ms    Q-T Interval 394 ms    QTc Calculation (Bazett) 416 ms    P Axis 35 degrees    R Axis -17 degrees    T Axis 55 degrees    Diagnosis

## 2023-11-27 NOTE — H&P
Normal breath sounds. No wheezing, rhonchi or rales. Abdominal:      General: Abdomen is flat. Bowel sounds are normal. There is no distension. Palpations: Abdomen is soft. Tenderness: There is no abdominal tenderness. Musculoskeletal:         General: No deformity. Normal range of motion. Cervical back: Normal range of motion and neck supple. Right lower leg: No edema. Left lower leg: No edema. Skin:     Coloration: Skin is not jaundiced or pale. Neurological:      General: No focal deficit present. Mental Status: She is alert and oriented to person, place, and time. Mental status is at baseline. Motor: Weakness present. Psychiatric:         Mood and Affect: Mood normal.         Behavior: Behavior normal.            Past History:   PMHx   Past Medical History:   Diagnosis Date    Abnormal brain MRI 7/20/2017    Partially empty sella and minimal chronic small vessel ischemic disease    Acute bilateral low back pain without sciatica 11/2/2016    SHARRON (acute kidney injury) ([address]) 7/5/2017    Arthritis     back    Bipolar disorder ([address]) 10/18/2008    CAD (coronary artery disease)     stent placed 6/8/20    Cancer ([address]) 2015    bilateral breast:s/p lumpectomy/radiation:under care care of breast specialist:Dr. Boone     Carotid stenosis, bilateral:<50%:per US 7/2016 7/15/2016    Carpal tunnel syndrome 10/18/2008    Cervical cancer screening 2014    Nml per pt'.     Coronary artery disease of native artery of native heart with stable angina pectoris ([address]) 6/9/2020    DDD (degenerative disc disease), lumbar 7/18/2018    Depression     under care of pschiatrist:Dr. 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 BNP: No results for input(s): \"PROBNP\" in the last 72 hours. UA:  Lab Results   Component Value Date/Time    NITRU Negative 09/02/2023 08:23 PM    COLORU Yellow 09/02/2023 08:23 PM    PHUR 5.0 09/02/2023 08:23 PM    WBCUA 6-9 09/02/2023 08:23 PM    RBCUA 5-10 09/02/2023 08:23 PM    YEAST Present 01/26/2021 04:09 PM    BACTERIA 3+ 09/02/2023 08:23 PM    CLARITYU Clear 09/02/2023 08:23 PM    SPECGRAV >=1.030 09/02/2023 08:23 PM    LEUKOCYTESUR Negative 09/02/2023 08:23 PM    UROBILINOGEN 0.2 09/02/2023 08:23 PM    BILIRUBINUR Negative 09/02/2023 08:23 PM    BILIRUBINUR negative 01/06/2013 12:11 AM    BILIRUBINUR Negative 06/07/2010 03:38 PM    BLOODU MODERATE 09/02/2023 08:23 PM    GLUCOSEU 500 09/02/2023 08:23 PM    GLUCOSEU >=1000 mg/dL 06/07/2010 03:38 PM    KETUA TRACE 09/02/2023 08:23 PM    AMORPHOUS Rare 09/27/2021 11:26 PM     Urine Cultures:   Lab Results   Component Value Date/Time    LABURIN  11/02/2021 01:53 PM     <10,000 CFU/ml mixed skin/urogenital katherine. No further workup     Blood Cultures:   Lab Results   Component Value Date/Time    BC No Growth after 4 days of incubation. 03/05/2021 11:13 PM     Lab Results   Component Value Date/Time    BLOODCULT2 No Growth after 4 days of incubation.  03/05/2021 11:13 PM     Organism:   Lab Results   Component Value Date/Time    ORG C. difficile toxin B gene detected 11/03/2023 09:31 AM       Imaging/Diagnostics Last 24 Hours   XR CHEST (2 VW)    Result Date:

## 2023-11-27 NOTE — TELEPHONE ENCOUNTER
Call from Dawna pt's god-daughter stating that the pt BS is high and she just called the life squad today    Dawna  stated the pt hasn't been using her sensors and she has been giving herself 8 units of insulin without checking her BS and hasn't been using her sliding scale     Dawna is requesting to speak with April or Dr. Franklin to further discuss     God daughter stated that she needs a monitor, lancets,test strips and alcohol pads sent to the pharmacy     Please advise   CB# [phone]

## 2023-11-27 NOTE — TELEPHONE ENCOUNTER
Dawna stated Mrs. Fried is on her way to the ER via squad due to high BS.  Mrs. Fried threw her meter away and has been using her SS based on sensor reading   without doing  finger stick. Her sensor has been reading high a lot so she just goes by the sensor reading of high and uses insulin based on scale.    Meter has been sent to he pharmacy, Dawna is aware.

## 2023-11-28 ENCOUNTER — APPOINTMENT (OUTPATIENT)
Dept: NUCLEAR MEDICINE | Age: 63
End: 2023-11-28
Payer: MEDICAID

## 2023-11-28 ENCOUNTER — APPOINTMENT (OUTPATIENT)
Dept: ULTRASOUND IMAGING | Age: 63
End: 2023-11-28
Payer: MEDICAID

## 2023-11-28 LAB
BACTERIA URNS QL MICRO: ABNORMAL /HPF
BILIRUB UR QL STRIP.AUTO: NEGATIVE
CHOLEST SERPL-MCNC: 154 MG/DL (ref 0–199)
CLARITY UR: CLEAR
COLOR UR: YELLOW
EKG ATRIAL RATE: 67 BPM
EKG DIAGNOSIS: NORMAL
EKG P AXIS: 35 DEGREES
EKG P-R INTERVAL: 152 MS
EKG Q-T INTERVAL: 394 MS
EKG QRS DURATION: 80 MS
EKG QTC CALCULATION (BAZETT): 416 MS
EKG R AXIS: -17 DEGREES
EKG T AXIS: 55 DEGREES
EKG VENTRICULAR RATE: 67 BPM
EPI CELLS #/AREA URNS HPF: ABNORMAL /HPF (ref 0–5)
EST. AVERAGE GLUCOSE BLD GHB EST-MCNC: 243.2 MG/DL
GGT SERPL-CCNC: 371 U/L (ref 5–36)
GLUCOSE BLD-MCNC: 270 MG/DL (ref 70–99)
GLUCOSE BLD-MCNC: 312 MG/DL (ref 70–99)
GLUCOSE BLD-MCNC: 416 MG/DL (ref 70–99)
GLUCOSE BLD-MCNC: 437 MG/DL (ref 70–99)
GLUCOSE UR STRIP.AUTO-MCNC: >=1000 MG/DL
HBA1C MFR BLD: 10.1 %
HDLC SERPL-MCNC: 75 MG/DL (ref 40–60)
HGB UR QL STRIP.AUTO: ABNORMAL
KETONES UR STRIP.AUTO-MCNC: NEGATIVE MG/DL
LDLC SERPL CALC-MCNC: 61 MG/DL
LEUKOCYTE ESTERASE UR QL STRIP.AUTO: NEGATIVE
NITRITE UR QL STRIP.AUTO: POSITIVE
PERFORMED ON: ABNORMAL
PH UR STRIP.AUTO: 5.5 [PH] (ref 5–8)
POTASSIUM SERPL-SCNC: 5 MMOL/L (ref 3.5–5.1)
PROT UR STRIP.AUTO-MCNC: 100 MG/DL
RBC #/AREA URNS HPF: ABNORMAL /HPF (ref 0–4)
SP GR UR STRIP.AUTO: 1.02 (ref 1–1.03)
TRIGL SERPL-MCNC: 89 MG/DL (ref 0–150)
TROPONIN, HIGH SENSITIVITY: 55 NG/L (ref 0–14)
TROPONIN, HIGH SENSITIVITY: 56 NG/L (ref 0–14)
TROPONIN, HIGH SENSITIVITY: 66 NG/L (ref 0–14)
TROPONIN, HIGH SENSITIVITY: 67 NG/L (ref 0–14)
UA COMPLETE W REFLEX CULTURE PNL UR: YES
UA DIPSTICK W REFLEX MICRO PNL UR: YES
URN SPEC COLLECT METH UR: ABNORMAL
UROBILINOGEN UR STRIP-ACNC: 0.2 E.U./DL
VLDLC SERPL CALC-MCNC: 18 MG/DL
WBC #/AREA URNS HPF: ABNORMAL /HPF (ref 0–5)

## 2023-11-28 PROCEDURE — 6360000002 HC RX W HCPCS: Performed by: STUDENT IN AN ORGANIZED HEALTH CARE EDUCATION/TRAINING PROGRAM

## 2023-11-28 PROCEDURE — 87086 URINE CULTURE/COLONY COUNT: CPT

## 2023-11-28 PROCEDURE — 84484 ASSAY OF TROPONIN QUANT: CPT

## 2023-11-28 PROCEDURE — 93306 TTE W/DOPPLER COMPLETE: CPT

## 2023-11-28 PROCEDURE — 3430000000 HC RX DIAGNOSTIC RADIOPHARMACEUTICAL: Performed by: INTERNAL MEDICINE

## 2023-11-28 PROCEDURE — 2580000003 HC RX 258: Performed by: STUDENT IN AN ORGANIZED HEALTH CARE EDUCATION/TRAINING PROGRAM

## 2023-11-28 PROCEDURE — A9502 TC99M TETROFOSMIN: HCPCS | Performed by: INTERNAL MEDICINE

## 2023-11-28 PROCEDURE — 93017 CV STRESS TEST TRACING ONLY: CPT

## 2023-11-28 PROCEDURE — 99215 OFFICE O/P EST HI 40 MIN: CPT | Performed by: INTERNAL MEDICINE

## 2023-11-28 PROCEDURE — 96361 HYDRATE IV INFUSION ADD-ON: CPT

## 2023-11-28 PROCEDURE — 76705 ECHO EXAM OF ABDOMEN: CPT

## 2023-11-28 PROCEDURE — 2580000003 HC RX 258: Performed by: INTERNAL MEDICINE

## 2023-11-28 PROCEDURE — 87077 CULTURE AEROBIC IDENTIFY: CPT

## 2023-11-28 PROCEDURE — 78452 HT MUSCLE IMAGE SPECT MULT: CPT

## 2023-11-28 PROCEDURE — G0378 HOSPITAL OBSERVATION PER HR: HCPCS

## 2023-11-28 PROCEDURE — 81001 URINALYSIS AUTO W/SCOPE: CPT

## 2023-11-28 PROCEDURE — 93010 ELECTROCARDIOGRAM REPORT: CPT | Performed by: INTERNAL MEDICINE

## 2023-11-28 PROCEDURE — 6370000000 HC RX 637 (ALT 250 FOR IP)

## 2023-11-28 PROCEDURE — 36415 COLL VENOUS BLD VENIPUNCTURE: CPT

## 2023-11-28 PROCEDURE — 6370000000 HC RX 637 (ALT 250 FOR IP): Performed by: INTERNAL MEDICINE

## 2023-11-28 PROCEDURE — 6360000002 HC RX W HCPCS: Performed by: INTERNAL MEDICINE

## 2023-11-28 PROCEDURE — 51798 US URINE CAPACITY MEASURE: CPT

## 2023-11-28 PROCEDURE — 6370000000 HC RX 637 (ALT 250 FOR IP): Performed by: STUDENT IN AN ORGANIZED HEALTH CARE EDUCATION/TRAINING PROGRAM

## 2023-11-28 PROCEDURE — 84132 ASSAY OF SERUM POTASSIUM: CPT

## 2023-11-28 PROCEDURE — 87186 SC STD MICRODIL/AGAR DIL: CPT

## 2023-11-28 RX ORDER — HEPARIN SODIUM 5000 [USP'U]/ML
5000 INJECTION, SOLUTION INTRAVENOUS; SUBCUTANEOUS 2 TIMES DAILY
Status: DISCONTINUED | OUTPATIENT
Start: 2023-11-29 | End: 2023-12-01 | Stop reason: HOSPADM

## 2023-11-28 RX ORDER — INSULIN LISPRO 100 [IU]/ML
0-4 INJECTION, SOLUTION INTRAVENOUS; SUBCUTANEOUS NIGHTLY
Status: DISCONTINUED | OUTPATIENT
Start: 2023-11-28 | End: 2023-11-30

## 2023-11-28 RX ORDER — ACETAMINOPHEN 650 MG
TABLET, EXTENDED RELEASE ORAL DAILY
Status: DISCONTINUED | OUTPATIENT
Start: 2023-11-28 | End: 2023-12-01 | Stop reason: HOSPADM

## 2023-11-28 RX ORDER — ISOSORBIDE MONONITRATE 30 MG/1
30 TABLET, EXTENDED RELEASE ORAL DAILY
Status: DISCONTINUED | OUTPATIENT
Start: 2023-11-28 | End: 2023-12-01 | Stop reason: HOSPADM

## 2023-11-28 RX ORDER — INSULIN LISPRO 100 [IU]/ML
0-16 INJECTION, SOLUTION INTRAVENOUS; SUBCUTANEOUS
Status: DISCONTINUED | OUTPATIENT
Start: 2023-11-28 | End: 2023-11-30

## 2023-11-28 RX ORDER — REGADENOSON 0.08 MG/ML
0.4 INJECTION, SOLUTION INTRAVENOUS
Status: COMPLETED | OUTPATIENT
Start: 2023-11-28 | End: 2023-11-28

## 2023-11-28 RX ORDER — INSULIN LISPRO 100 [IU]/ML
INJECTION, SOLUTION INTRAVENOUS; SUBCUTANEOUS
Status: COMPLETED
Start: 2023-11-28 | End: 2023-11-28

## 2023-11-28 RX ORDER — SODIUM CHLORIDE, SODIUM GLUCONATE, SODIUM ACETATE, POTASSIUM CHLORIDE AND MAGNESIUM CHLORIDE 526; 502; 368; 37; 30 MG/100ML; MG/100ML; MG/100ML; MG/100ML; MG/100ML
60 INJECTION, SOLUTION INTRAVENOUS CONTINUOUS
Status: DISCONTINUED | OUTPATIENT
Start: 2023-11-28 | End: 2023-11-29

## 2023-11-28 RX ADMIN — OXYCODONE AND ACETAMINOPHEN 1 TABLET: 7.5; 325 TABLET ORAL at 20:33

## 2023-11-28 RX ADMIN — INSULIN LISPRO 4 UNITS: 100 INJECTION, SOLUTION INTRAVENOUS; SUBCUTANEOUS at 08:18

## 2023-11-28 RX ADMIN — TICAGRELOR 90 MG: 90 TABLET ORAL at 20:33

## 2023-11-28 RX ADMIN — Medication 10 ML: at 20:33

## 2023-11-28 RX ADMIN — ASPIRIN 81 MG: 81 TABLET, CHEWABLE ORAL at 08:10

## 2023-11-28 RX ADMIN — ATORVASTATIN CALCIUM 40 MG: 40 TABLET, FILM COATED ORAL at 20:33

## 2023-11-28 RX ADMIN — AMLODIPINE BESYLATE 10 MG: 5 TABLET ORAL at 08:10

## 2023-11-28 RX ADMIN — Medication 10 ML: at 08:10

## 2023-11-28 RX ADMIN — OXYCODONE AND ACETAMINOPHEN 1 TABLET: 7.5; 325 TABLET ORAL at 08:10

## 2023-11-28 RX ADMIN — REGADENOSON 0.4 MG: 0.08 INJECTION, SOLUTION INTRAVENOUS at 11:50

## 2023-11-28 RX ADMIN — INSULIN LISPRO 16 UNITS: 100 INJECTION, SOLUTION INTRAVENOUS; SUBCUTANEOUS at 16:40

## 2023-11-28 RX ADMIN — SODIUM CHLORIDE, SODIUM GLUCONATE, SODIUM ACETATE, POTASSIUM CHLORIDE AND MAGNESIUM CHLORIDE 60 ML/HR: 526; 502; 368; 37; 30 INJECTION, SOLUTION INTRAVENOUS at 14:55

## 2023-11-28 RX ADMIN — PRAZOSIN HYDROCHLORIDE 4 MG: 1 CAPSULE ORAL at 20:33

## 2023-11-28 RX ADMIN — ISOSORBIDE MONONITRATE 30 MG: 30 TABLET, EXTENDED RELEASE ORAL at 14:56

## 2023-11-28 RX ADMIN — PANTOPRAZOLE SODIUM 40 MG: 40 TABLET, DELAYED RELEASE ORAL at 08:10

## 2023-11-28 RX ADMIN — PRAZOSIN HYDROCHLORIDE 4 MG: 1 CAPSULE ORAL at 14:56

## 2023-11-28 RX ADMIN — TRAZODONE HYDROCHLORIDE 200 MG: 50 TABLET ORAL at 20:33

## 2023-11-28 RX ADMIN — TICAGRELOR 90 MG: 90 TABLET ORAL at 08:10

## 2023-11-28 RX ADMIN — TETROFOSMIN 10.4 MILLICURIE: 1.38 INJECTION, POWDER, LYOPHILIZED, FOR SOLUTION INTRAVENOUS at 10:13

## 2023-11-28 RX ADMIN — ACETAMINOPHEN 650 MG: 325 TABLET ORAL at 15:02

## 2023-11-28 RX ADMIN — TETROFOSMIN 32 MILLICURIE: 1.38 INJECTION, POWDER, LYOPHILIZED, FOR SOLUTION INTRAVENOUS at 11:50

## 2023-11-28 ASSESSMENT — PAIN DESCRIPTION - LOCATION: LOCATION: HEAD

## 2023-11-28 ASSESSMENT — PAIN SCALES - GENERAL
PAINLEVEL_OUTOF10: 8
PAINLEVEL_OUTOF10: 5
PAINLEVEL_OUTOF10: 8

## 2023-11-28 ASSESSMENT — PAIN DESCRIPTION - DESCRIPTORS: DESCRIPTORS: ACHING

## 2023-11-28 NOTE — PROGRESS NOTES
Occupational Therapy & Physical Therapy    Attempted to see pt for OT/PT evaluation in AM. However, pt off floor. Re-attempted to see pt again for evaluation in PM, but pt still off floor. Will re-attempt as pt able and schedule allows. Thanks.      Pooja Pickering OTR/REG Holder PT, DPT

## 2023-11-28 NOTE — CONSULTS
Consult to nephrology for SHARRON/ Wilms Tumor.  Called office on 11/28/2023 @ 1034am. Electronically signed by Dara Sanchez RN on 11/28/2023 at 10:35 AM

## 2023-11-28 NOTE — PROGRESS NOTES
Pt admitted to room 116 from ER. VSS. Admission and shift assessment charted and completed. Pt oriented to room, call light, and telephone. Potassium is 5.9- calcium gluconate, insulin, and dextrose given. BS was 109-266 after dinner. Prn pain medication given for back pain. 4 eye skin assessment completed. Pt has wound to left foot. Guaze applied. Purewick on. Pt will be NPO for US of gallbladder. Active bowel sounds in all 4 quadrants. Bedside table and call light within reach. Pt verbalized understanding when calling out for bathroom. Nonskid socks on. Pt denies further needs or questions.

## 2023-11-28 NOTE — CONSULTS
Podiatry Consult Note      Reason for Consult:  Left foot ulcer  Requesting Physician:  Hospitalist    Chief Complaint:  Left foot ulcer    History of Present Illness: The patient is a pleasant 58year old woman with DM2 who we were asked to see for a possible wound at the left foot. The patient has had some pain under the left first MTPJ for several weeks. She usually sees her Podiatrist (Dr. Isabella Vasquez) for care, just saw him last week and had the left foot areas debrided. She has no other pedal complaints. She was admitted with hypertensive urgency. Past Medical History:        Diagnosis Date    Abnormal brain MRI 7/20/2017    Partially empty sella and minimal chronic small vessel ischemic disease    Acute bilateral low back pain without sciatica 11/2/2016    SHARRON (acute kidney injury) ([address]) 7/5/2017    Arthritis     back    Bipolar disorder ([address]) 10/18/2008    CAD (coronary artery disease)     stent placed 6/8/20    Cancer ([address]) 2015    bilateral breast:s/p lumpectomy/radiation:under care care of breast specialist:Dr. Boone     Carotid stenosis, bilateral:<50%:per US 7/2016 7/15/2016    Carpal tunnel syndrome 10/18/2008    Cervical cancer screening 2014    Nml per pt'.     Coronary artery disease of native artery of native heart with stable angina pectoris ([address]) 6/9/2020    DDD (degenerative disc disease), lumbar 7/18/2018    Depression     under care of pschiatrist:Dr. 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 stage 3 chronic kidney disease ([address]) 9/17/2017    Microalbuminuria 7/1/2016    Neuropathy in diabetes [facility])     Non morbid obesity 7/1/2016    Pancreatitis 5/12/16    [facility] hospitalization 5/12/16-5/16/16:under care of GI:chronic TID WC  insulin lispro (HUMALOG) injection vial 0-4 Units, 0-4 Units, SubCUTAneous, Nightly  atorvastatin (LIPITOR) tablet 40 mg, 40 mg, Oral, Nightly  aspirin chewable tablet 81 mg, 81 mg, Oral, Daily  oxyCODONE-acetaminophen (PERCOCET) 7.5-325 MG per tablet 1 tablet, 1 tablet, Oral, Q4H PRN  pantoprazole (PROTONIX) tablet 40 mg, 40 mg, Oral, Daily  glucose chewable tablet 16 g, 4 tablet, Oral, PRN  dextrose bolus 10% 125 mL, 125 mL, IntraVENous, PRN **OR** dextrose bolus 10% 250 mL, 250 mL, IntraVENous, PRN  glucagon injection 1 mg, 1 mg, SubCUTAneous, PRN  dextrose 10 % infusion, , IntraVENous, Continuous PRN  traZODone (DESYREL) tablet 200 mg, 200 mg, Oral, Nightly  ticagrelor (BRILINTA) tablet 90 mg, 90 mg, Oral, BID  prazosin (MINIPRESS) capsule 4 mg, 4 mg, Oral, BID  amLODIPine (NORVASC) tablet 10 mg, 10 mg, Oral, Daily  sodium chloride flush 0.9 % injection 5-40 mL, 5-40 mL, IntraVENous, 2 times per day  sodium chloride flush 0.9 % injection 5-40 mL, 5-40 mL, IntraVENous, PRN  0.9 % sodium chloride infusion, , IntraVENous, PRN  ondansetron (ZOFRAN-ODT) disintegrating tablet 4 mg, 4 mg, Oral, Q8H PRN **OR** ondansetron (ZOFRAN) injection 4 mg, 4 mg, IntraVENous, Q6H PRN  polyethylene glycol (GLYCOLAX) packet 17 g, 17 g, Oral, Daily PRN  acetaminophen (TYLENOL) tablet 650 mg, 650 mg, Oral, Q6H PRN **OR** acetaminophen (TYLENOL) suppository 650 mg, 650 mg, Rectal, Q6H PRN  glucose chewable tablet 16 g, 4 tablet, Oral, PRN  dextrose bolus 10% 125 mL, 125 mL, IntraVENous, PRN **OR** dextrose bolus 10% 250 mL, 250 mL, IntraVENous, PRN  glucagon injection 1 mg, 1 mg, SubCUTAneous, PRN  dextrose 10 % infusion, , IntraVENous, Continuous PRN    Allergies:  Morphine, Penicillins, Codeine, and Penicillin g    Social History:      Family History:     Review of Systems:    CONSTITUTIONAL:  negative for n/v/f/c  RESPIRATORY:  negative for  dry cough and wheezing    Physical Exam:    Vitals:    BP (!) 162/66   Pulse 60

## 2023-11-28 NOTE — CONSULTS
[facility] Continence Nurse  Consult Note       NAME:  Rafal Hernandez  MEDICAL RECORD NUMBER:  [id number]  AGE: 58 y.o. GENDER: female  : 1960  TODAY'S DATE:  2023    Subjective; I don't know how this started. Reason for WOCN Evaluation and Assessment:     left foot wound     Patient has appointment with Dr. Sapphire Sabillon. Rafal Hernandez is a 58 y.o. female referred by:   [] Physician  [x] Nursing  [] Other:     Wound Identification:  Wound Type: diabetic and pressure  Contributing Factors: diabetes, poor glucose control, chronic pressure, decreased mobility, and shear force    Wound History:     left foot wound     Current Wound Care Treatment:  TORSTEN gauze    Patient Goal of Care:  [x] Wound Healing  [] Odor Control  [] Palliative Care  [x] Pain Control   [] Other:         PAST MEDICAL HISTORY        Diagnosis Date    Abnormal brain MRI 2017    Partially empty sella and minimal chronic small vessel ischemic disease    Acute bilateral low back pain without sciatica 2016    SHARRON (acute kidney injury) ([address]) 2017    Arthritis     back    Bipolar disorder ([address]) 10/18/2008    CAD (coronary artery disease)     stent placed 20    Cancer ([address])     bilateral breast:s/p lumpectomy/radiation:under care care of breast specialist:Dr. Boone     Carotid stenosis, bilateral:<50%:per US 2016 7/15/2016    Carpal tunnel syndrome 10/18/2008    Cervical cancer screening 2014    Nml per pt'.     Coronary artery disease of native artery of native heart with stable angina pectoris ([address]) 2020    DDD (degenerative disc disease), lumbar 2018    Depression     under care of pschiatrist:Dr. Geoff Garcia    Depression/anxiety 2017    Depression/anxiety     Diabetes mellitus ([address])     Gout     History of mammogram 10/28/2016;17    Negative    History of therapeutic radiation     Hyperlipidemia     Hypertension     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 heart

## 2023-11-28 NOTE — PROGRESS NOTES
Verbal orders from Cardiology to reorder diet.  Electronically signed by Sofía Sanchez RN on 11/28/2023 at 3:05 PM

## 2023-11-28 NOTE — PROGRESS NOTES
4 Eyes Skin Assessment     The patient is being assess for  Admission    I agree that 2 RN's have performed a thorough Head to Toe Skin Assessment on the patient. ALL assessment sites listed below have been assessed. Areas assessed by both nurses: Nikole Parody, /  [x]   Head, Face, and Ears   []   Shoulders, Back, and Chest  [x]   Arms, Elbows, and Hands   [x]   Coccyx, Sacrum, and IschIum  [x]   Legs, Feet, and Heels        Does the Patient have Skin Breakdown?  Yes          Maco Prevention initiated:  Yes   Wound Care Orders initiated:  No      [facility] nurse consulted for Pressure Injury (Stage 3,4, Unstageable, DTI, NWPT, and Complex wounds), New and Established Ostomies:  yes      Nurse 1 eSignature: Electronically signed by Harsha Mcadams RN on 11/27/23 at 9:33 PM EST    **SHARE this note so that the co-signing nurse is able to place an eSignature**    Nurse 2 eSignature: {Esignature:[id number]}

## 2023-11-28 NOTE — PROGRESS NOTES
being discharged tomorrow and is appropriate for A1 Discharge Unit in AM pending clinical course overnight: []Yes  [x]No    ------------------------------------------------------------------------------------------------------------------------------------------------------------------------    MDM      [x] High (any 2)    A. Problems (any 1)  [x] Acute/Chronic Illness/injury posing threat to life or bodily function:    [] Severe exacerbation of chronic illness:    ---------------------------------------------------------------------  B. Risk of Treatment (any 1)   [x] Drugs/treatments that require intensive monitoring for toxicity include:    [] Change in code status:    [] Decision to escalate care:    [] Major surgery/procedure with associated risk factors:    ----------------------------------------------------------------------  C. Data (any 2)  [] Discussed current management and discharge planning options with Case Management. [] Discussed management of the case with:    [x] Telemetry personally reviewed and interpreted as documented above    [] Imaging personally reviewed and interpreted, includes:    [x] Data Review (any 3)  [] Collateral history obtained from:    [x] All available Consultant notes from yesterday/today were reviewed  [x] All current labs were reviewed and interpreted for clinical significance   [x] Appropriate follow-up labs were ordered    Medications:  Personally reviewed in detail in conjunction w/ labs as documented for evidence of drug toxicity.      Infusion Medications    electrolyte-A 60 mL/hr (11/28/23 1455)    dextrose      sodium chloride      dextrose       Scheduled Medications    isosorbide mononitrate  30 mg Oral Daily    povidone-iodine   Topical Daily    [START ON 11/29/2023] heparin (porcine)  5,000 Units SubCUTAneous BID    insulin lispro  0-16 Units SubCUTAneous TID WC    insulin lispro  0-4 Units SubCUTAneous Nightly    atorvastatin  40 mg Oral Nightly    aspirin  81 mg

## 2023-11-28 NOTE — CONSULTS
[address] [place] [address]  [phone]      Attending Physician: Nica Roe MD  Reason for Consultation/Chief Complaint: pain all over    Subjective   History of Present Illness:  Lia Nowak is a 58 y.o. patient who presented to the hospital with complaints of patient who presented to the hospital with complaints of pain all over, she says she feels like she got run over by a [employer] truck, she says she has pain in her back, chest but somewhat all over, she denies shortness of breath, nausea, vomiting, palpitations, dizziness or loss of consciousness. She presented to the hospital yesterday and was admitted overnight, she was noted to have uncontrolled hypertension, high-sensitivity troponin levels were noted to be mildly elevated at 66, then 57 and 56. Patient states she been compliant with her medications. She gets her usual cardiac care through [facility], her cardiologist there is Dr. Cindy Zavala. Her cardiac history dates back to 2010, she was evaluated in the hospital for syncope, she had an echocardiogram which showed mild diffuse hypokinesis with ejection fraction of 45 to 50%. At that time, plans were made for outpatient follow-up with heart monitor. She has continued to follow-up with doctors at [facility], her last visit at [address] being on September 13, 2019, at that time she was felt to be stable with regard to chronic conditions which included hypertension, cardiomyopathy, history of syncope and TIA. She did ultimately transition to our practice in 2020, last saw our nurse practitioner after her cardiac catheterization was performed with diagonal branch PCI in 2020 with Dr. Le Campos although she has since transition back to [facility]. Patient chronically also states she has diabetes, follows with endocrinology, she says her blood sugar is not well controlled.      She was evaluated consult service in 2021, and at that time she underwent catheterization which showed troponin, it would be reasonable to risk ratified further with follow-up stress testing. Would like to potentially manage medically given hypertension that is uncontrolled as well as chronic kidney disease and anemia. It is likely that further management will be in the outpatient setting. CAD/ASHD, continue dual antiplatelet therapy    Hypertension, continue calcium channel blocker, no beta-blocker due to history of bradycardia. Will add long-acting nitrates. Hypercholesterolemia, continue statin    Diabetes, anemia, as per primary service    I expect stress test may be moderately abnormal, however, plan is only for med mgtment as noted above and findings will be used for long term risk stratification. Pt [facility] and understands she may need ht cath and she would like to defer that presently and d/w her doctors at [address] No further inpatient cardiac workup or treatment planned, will sign off, please call with questions. Thank you for allowing us to participate in the care of Rosa Maria Edwards. Please call me with any questions [phone].     Marolyn Lefort, MD, [facility]   Interventional Cardiologist  [address] [place] [address]  [phone] [facility]  [phone] [address] [facility]  11/28/2023 8:13 AM

## 2023-11-28 NOTE — PROGRESS NOTES
Hospitalist made aware of increased BS at 437. Will watch for new orders.  Electronically signed by Ruthie Howard RN on 11/28/2023 at 4:14 PM

## 2023-11-28 NOTE — PROGRESS NOTES
A chrissy stress test was completed on this patient as ordered. The patient tolerated the procedure well. Awaiting stress imaging at this time.

## 2023-11-28 NOTE — CARE COORDINATION
Case Management Assessment  Initial Evaluation    Date/Time of Evaluation: 11/28/2023 10:44 AM  Assessment Completed by: Guillermo De Guzman RN    If patient is discharged prior to next notation, then this note serves as note for discharge by case management. Patient Name: Lorene Wasserman                   YOB: 1960  Diagnosis: Precordial pain [R07.2]  Hyperkalemia [E87.5]  Atypical chest pain [R07.89]  Chronic anemia [D64.9]  Chronic kidney disease, unspecified CKD stage [N18.9]                   Date / Time: 11/27/2023 11:52 AM    Patient Admission Status: Observation   Readmission Risk (Low < 19, Mod (19-27), High > 27): Readmission Risk Score: 23.4    Current PCP: SARTHAK Purcell NP  PCP verified by CM? (P) Yes    Chart Reviewed: Yes      History Provided by: (P) Patient  Patient Orientation: (P) Alert and Oriented    Patient Cognition: (P) Alert    Hospitalization in the last 30 days (Readmission):  Yes    If yes, Readmission Assessment in  Navigator will be completed.   Readmission Assessment  Number of Days since last admission?: 8-30 days  Previous Disposition: Home Alone  Who is being Interviewed: Patient  What was the patient's/caregiver's perception as to why they think they needed to return back to the hospital?: Other (Comment) (had chest pain and came to the ER)  Did you visit your Primary Care Physician after you left the hospital, before you returned this time?: No  Why weren't you able to visit your PCP?: Other (Comment) (seen pcp in october)  Did you see a specialist, such as Cardiac, Pulmonary, Orthopedic Physician, etc. after you left the hospital?: Yes (seen endo on 11/2)  Who advised the patient to return to the hospital?: Self-referral  Does the patient report anything that got in the way of taking their medications?: No  In our efforts to provide the best possible care to you and others like you, can you think of anything that we could have done to help you after you left the hospital the first time, so that you might not have needed to return so soon?: Identify patient's health literacy needs, Improved written discharge instructions, Teaching during hospitalization regarding your illness, Discharge instructions that are concise, clear, and non contradictory   Advance Directives:      Code Status: Full Code   Patient's Primary Decision Maker is: (P) Legal Next of Kin    Primary Decision Maker: Latoya Kellogg - [phone]    Secondary Decision Maker:  CorkyMoi - Brother/Sister - [phone]    Supplemental (Other) Decision Maker: Valdo Luke - Other - [phone]    Discharge Planning:    Patient lives with: (P) Alone Type of Home: (P) Apartment  Primary Care Giver: (P) Self  Patient Support Systems include: (P) Children   Current Financial resources: (P) Medicaid  Current community resources: (P) None  Current services prior to admission: (P) None, Durable Medical Equipment            Current DME: (P) Walker, Shower Chair            Type of Home Care services:  (P) PT, OT, Nursing Services    ADLS  Prior functional level: (P) Independent in ADLs/IADLs  Current functional level: (P) Independent in ADLs/IADLs    PT AM-PAC:   /24  OT AM-PAC:   /24    Family can provide assistance at DC: (P) Yes  Would you like Case Management to discuss the discharge plan with any other family members/significant others, and if so, who? (P) Yes  Plans to Return to Present Housing: (P) Yes  Other Identified Issues/Barriers to RETURNING to current housing: Lives alone, but has family support  Potential Assistance needed at discharge: (P) Home Care            Potential DME:    Patient expects to discharge to: (P)  [address] for transportation at discharge: (P) Self    Financial    Payor: [address] [place] [address] / Plan: Rogelio Mosley / Product Type: *No Product type* /     Does insurance require precert for SNF: Yes    Potential assistance Purchasing Medications:

## 2023-11-28 NOTE — CONSULTS
Consult called into podiatry office on 11/28/2023 @ 1039am. Electronically signed by Katlin Haider RN on 11/28/2023 at 10:40 AM

## 2023-11-29 LAB
ALBUMIN SERPL-MCNC: 2.9 G/DL (ref 3.4–5)
ALBUMIN/GLOB SERPL: 0.7 {RATIO} (ref 1.1–2.2)
ALP SERPL-CCNC: 305 U/L (ref 40–129)
ALT SERPL-CCNC: 44 U/L (ref 10–40)
ANION GAP SERPL CALCULATED.3IONS-SCNC: 13 MMOL/L (ref 3–16)
AST SERPL-CCNC: 34 U/L (ref 15–37)
BILIRUB SERPL-MCNC: 0.4 MG/DL (ref 0–1)
BUN SERPL-MCNC: 27 MG/DL (ref 7–20)
CALCIUM SERPL-MCNC: 8.8 MG/DL (ref 8.3–10.6)
CHLORIDE SERPL-SCNC: 104 MMOL/L (ref 99–110)
CO2 SERPL-SCNC: 18 MMOL/L (ref 21–32)
CREAT SERPL-MCNC: 1.6 MG/DL (ref 0.6–1.2)
DEPRECATED RDW RBC AUTO: 14 % (ref 12.4–15.4)
GFR SERPLBLD CREATININE-BSD FMLA CKD-EPI: 36 ML/MIN/{1.73_M2}
GLUCOSE BLD-MCNC: 162 MG/DL (ref 70–99)
GLUCOSE BLD-MCNC: 214 MG/DL (ref 70–99)
GLUCOSE BLD-MCNC: 312 MG/DL (ref 70–99)
GLUCOSE BLD-MCNC: 331 MG/DL (ref 70–99)
GLUCOSE SERPL-MCNC: 281 MG/DL (ref 70–99)
HCT VFR BLD AUTO: 27.8 % (ref 36–48)
HGB BLD-MCNC: 8.9 G/DL (ref 12–16)
MCH RBC QN AUTO: 27.2 PG (ref 26–34)
MCHC RBC AUTO-ENTMCNC: 32 G/DL (ref 31–36)
MCV RBC AUTO: 85.2 FL (ref 80–100)
PERFORMED ON: ABNORMAL
PLATELET # BLD AUTO: 149 K/UL (ref 135–450)
PMV BLD AUTO: 9.4 FL (ref 5–10.5)
POTASSIUM SERPL-SCNC: 5.2 MMOL/L (ref 3.5–5.1)
PROT SERPL-MCNC: 7 G/DL (ref 6.4–8.2)
RBC # BLD AUTO: 3.27 M/UL (ref 4–5.2)
SODIUM SERPL-SCNC: 135 MMOL/L (ref 136–145)
TROPONIN, HIGH SENSITIVITY: 65 NG/L (ref 0–14)
TROPONIN, HIGH SENSITIVITY: 76 NG/L (ref 0–14)
WBC # BLD AUTO: 4.9 K/UL (ref 4–11)

## 2023-11-29 PROCEDURE — 36415 COLL VENOUS BLD VENIPUNCTURE: CPT

## 2023-11-29 PROCEDURE — 96361 HYDRATE IV INFUSION ADD-ON: CPT

## 2023-11-29 PROCEDURE — G0378 HOSPITAL OBSERVATION PER HR: HCPCS

## 2023-11-29 PROCEDURE — 6370000000 HC RX 637 (ALT 250 FOR IP): Performed by: STUDENT IN AN ORGANIZED HEALTH CARE EDUCATION/TRAINING PROGRAM

## 2023-11-29 PROCEDURE — 97161 PT EVAL LOW COMPLEX 20 MIN: CPT

## 2023-11-29 PROCEDURE — 2580000003 HC RX 258: Performed by: INTERNAL MEDICINE

## 2023-11-29 PROCEDURE — 84484 ASSAY OF TROPONIN QUANT: CPT

## 2023-11-29 PROCEDURE — 2580000003 HC RX 258: Performed by: STUDENT IN AN ORGANIZED HEALTH CARE EDUCATION/TRAINING PROGRAM

## 2023-11-29 PROCEDURE — 80053 COMPREHEN METABOLIC PANEL: CPT

## 2023-11-29 PROCEDURE — 96372 THER/PROPH/DIAG INJ SC/IM: CPT

## 2023-11-29 PROCEDURE — 97116 GAIT TRAINING THERAPY: CPT

## 2023-11-29 PROCEDURE — 6360000002 HC RX W HCPCS: Performed by: NURSE PRACTITIONER

## 2023-11-29 PROCEDURE — 97535 SELF CARE MNGMENT TRAINING: CPT

## 2023-11-29 PROCEDURE — 6370000000 HC RX 637 (ALT 250 FOR IP): Performed by: INTERNAL MEDICINE

## 2023-11-29 PROCEDURE — 97530 THERAPEUTIC ACTIVITIES: CPT

## 2023-11-29 PROCEDURE — 97165 OT EVAL LOW COMPLEX 30 MIN: CPT

## 2023-11-29 PROCEDURE — 6370000000 HC RX 637 (ALT 250 FOR IP): Performed by: NURSE PRACTITIONER

## 2023-11-29 PROCEDURE — 85027 COMPLETE CBC AUTOMATED: CPT

## 2023-11-29 RX ADMIN — TRAZODONE HYDROCHLORIDE 200 MG: 50 TABLET ORAL at 20:41

## 2023-11-29 RX ADMIN — ASPIRIN 81 MG: 81 TABLET, CHEWABLE ORAL at 09:36

## 2023-11-29 RX ADMIN — Medication 10 ML: at 20:43

## 2023-11-29 RX ADMIN — PRAZOSIN HYDROCHLORIDE 4 MG: 1 CAPSULE ORAL at 20:41

## 2023-11-29 RX ADMIN — SODIUM CHLORIDE, SODIUM GLUCONATE, SODIUM ACETATE, POTASSIUM CHLORIDE AND MAGNESIUM CHLORIDE 60 ML/HR: 526; 502; 368; 37; 30 INJECTION, SOLUTION INTRAVENOUS at 12:36

## 2023-11-29 RX ADMIN — INSULIN LISPRO 12 UNITS: 100 INJECTION, SOLUTION INTRAVENOUS; SUBCUTANEOUS at 09:35

## 2023-11-29 RX ADMIN — HEPARIN SODIUM 5000 UNITS: 5000 INJECTION INTRAVENOUS; SUBCUTANEOUS at 20:41

## 2023-11-29 RX ADMIN — HEPARIN SODIUM 5000 UNITS: 5000 INJECTION INTRAVENOUS; SUBCUTANEOUS at 09:36

## 2023-11-29 RX ADMIN — PRAZOSIN HYDROCHLORIDE 4 MG: 1 CAPSULE ORAL at 09:35

## 2023-11-29 RX ADMIN — TICAGRELOR 90 MG: 90 TABLET ORAL at 20:41

## 2023-11-29 RX ADMIN — TICAGRELOR 90 MG: 90 TABLET ORAL at 09:36

## 2023-11-29 RX ADMIN — PANTOPRAZOLE SODIUM 40 MG: 40 TABLET, DELAYED RELEASE ORAL at 09:36

## 2023-11-29 RX ADMIN — ATORVASTATIN CALCIUM 40 MG: 40 TABLET, FILM COATED ORAL at 20:41

## 2023-11-29 RX ADMIN — AMLODIPINE BESYLATE 10 MG: 5 TABLET ORAL at 09:36

## 2023-11-29 RX ADMIN — INSULIN LISPRO 4 UNITS: 100 INJECTION, SOLUTION INTRAVENOUS; SUBCUTANEOUS at 12:36

## 2023-11-29 RX ADMIN — ISOSORBIDE MONONITRATE 30 MG: 30 TABLET, EXTENDED RELEASE ORAL at 09:36

## 2023-11-29 RX ADMIN — OXYCODONE AND ACETAMINOPHEN 1 TABLET: 7.5; 325 TABLET ORAL at 16:38

## 2023-11-29 ASSESSMENT — PAIN DESCRIPTION - LOCATION: LOCATION: BACK

## 2023-11-29 ASSESSMENT — PAIN - FUNCTIONAL ASSESSMENT: PAIN_FUNCTIONAL_ASSESSMENT: PREVENTS OR INTERFERES SOME ACTIVE ACTIVITIES AND ADLS

## 2023-11-29 ASSESSMENT — PAIN DESCRIPTION - DESCRIPTORS: DESCRIPTORS: ACHING;DISCOMFORT

## 2023-11-29 ASSESSMENT — PAIN SCALES - GENERAL
PAINLEVEL_OUTOF10: 5
PAINLEVEL_OUTOF10: 2

## 2023-11-29 NOTE — PROGRESS NOTES
Occupational Therapy  Facility/Department: [address] [place] [address]  Occupational Therapy Initial Assessment    Name: Shirin Montana  : 1960  MRN: [id number]  Date of Service: 2023    Discharge Recommendations:  24 hour supervision or assist, Home with Home health OT  OT Equipment Recommendations  Equipment Needed: Yes  Mobility Devices: ADL Assistive Devices  ADL Assistive Devices: Grab Bars - toilet;Grab Bars - tub       Patient Diagnosis(es): The primary encounter diagnosis was Precordial pain. Diagnoses of Hyperkalemia, Chronic kidney disease, unspecified CKD stage, and Chronic anemia were also pertinent to this visit. Past Medical History:  has a past medical history of Abnormal brain MRI, Acute bilateral low back pain without sciatica, SHARRON (acute kidney injury) ([address]), Arthritis, Bipolar disorder ([address]), CAD (coronary artery disease), Cancer ([address]), Carotid stenosis, bilateral:<50%:per US 2016, Carpal tunnel syndrome, Cervical cancer screening, Coronary artery disease of native artery of native heart with stable angina pectoris ([address]), DDD (degenerative disc disease), lumbar, Depression, Depression/anxiety, Depression/anxiety, Diabetes mellitus ([address]), Gout, History of mammogram, History of therapeutic radiation, Hyperlipidemia, Hypertension, 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 Past Surgical History:  has a past surgical history that includes Kidney removal; Hysterectomy; Breast lumpectomy (); Tubal ligation; other surgical history (Right); Cardiac catheterization (2020); Upper gastrointestinal endoscopy (N/A, 2021);  Colonoscopy

## 2023-11-29 NOTE — PROGRESS NOTES
Reason for admission:                 Atypical chest pain    Brief Summary :     Sahil Elias is being seen by nephrology for CKD/hyperkalemia. She is known to have right hemicolectomy, nephrectomy for Wilms tumor presented with atypical chest pain. Has chronic diarrhea.   She is found to have hyperkalemia and rising creatinine      Interval History and plan:      Creatinine 1.6  Potassium 5.2  Renal function getting better   Has no diarrhea  Bicarb on the lower side at 18 likely due to chloride containing IV fluids  hold off on IV fluids and monitor  Okay to discharge from renal is perspective --- once okay with other specialty                     Assessment :     Hyperkalemia  Creatinine was high on admission getting better  Counseled about low potassium diet she was taking a lot of bananas and also was taking a lot of tomoto  Sauce etc, she is aware of high potassium content in those food now  Also high K is due to SHARRON    CKD Stage III a with SHARRON  Chronic kidney disease stage IIIa  Creatinine baseline about 1-1.3  Has history of frequent SHARRON due to chronic diarrhea and due to colectomy  Also has reduced renal mass-had nephrectomy due to Wilms tumor  Diabetes also contributing to CKD  History of proteinuria  Renal imaging-absent right kidney  2D echo: 9/21-if EF normal, no mention of diastolic dysfunction    Hypertension   BP: (127-161)/(70-76)  Pulse:  [64-78]   BP goal inpatient [phone] systolic inpatient        [facility] would like to thank Vivienne Sweet MD   for opportunity to serve this patient      Please call with questions at-   24 Hrs Answering service [phone] or  7 am- 5 pm via Perfect serve or cell phone  Kirby Miles MD       HPI :     Sahil Elias is a 58 y.o. female presented to   the hospital on 11/27/2023 with known chronic kidney disease stage IIIa, colon cancer status post right hemicolectomy CAD diabetes hypertension and known chronic diarrhea presented cerebral artery occlusion with cerebral infarction     TIA          Medication:     Current Facility-Administered Medications: isosorbide mononitrate (IMDUR) extended release tablet 30 mg, 30 mg, Oral, Daily  povidone-iodine (BETADINE) 10 % external solution, , Topical, Daily  electrolyte-A (PLASMALYTE-A) solution, 60 mL/hr, IntraVENous, Continuous  heparin (porcine) injection 5,000 Units, 5,000 Units, SubCUTAneous, BID  insulin lispro (HUMALOG) injection vial 0-16 Units, 0-16 Units, SubCUTAneous, TID WC  insulin lispro (HUMALOG) injection vial 0-4 Units, 0-4 Units, SubCUTAneous, Nightly  atorvastatin (LIPITOR) tablet 40 mg, 40 mg, Oral, Nightly  aspirin chewable tablet 81 mg, 81 mg, Oral, Daily  oxyCODONE-acetaminophen (PERCOCET) 7.5-325 MG per tablet 1 tablet, 1 tablet, Oral, Q4H PRN  pantoprazole (PROTONIX) tablet 40 mg, 40 mg, Oral, Daily  traZODone (DESYREL) tablet 200 mg, 200 mg, Oral, Nightly  ticagrelor (BRILINTA) tablet 90 mg, 90 mg, Oral, BID  prazosin (MINIPRESS) capsule 4 mg, 4 mg, Oral, BID  amLODIPine (NORVASC) tablet 10 mg, 10 mg, Oral, Daily  sodium chloride flush 0.9 % injection 5-40 mL, 5-40 mL, IntraVENous, 2 times per day  sodium chloride flush 0.9 % injection 5-40 mL, 5-40 mL, IntraVENous, PRN  0.9 % sodium chloride infusion, , IntraVENous, PRN  ondansetron (ZOFRAN-ODT) disintegrating tablet 4 mg, 4 mg, Oral, Q8H PRN **OR** ondansetron (ZOFRAN) injection 4 mg, 4 mg, IntraVENous, Q6H PRN  polyethylene glycol (GLYCOLAX) packet 17 g, 17 g, Oral, Daily PRN  acetaminophen (TYLENOL) tablet 650 mg, 650 mg, Oral, Q6H PRN **OR** acetaminophen (TYLENOL) suppository 650 mg, 650 mg, Rectal, Q6H PRN  glucose chewable tablet 16 g, 4 tablet, Oral, PRN  dextrose bolus 10% 125 mL, 125 mL, IntraVENous, PRN **OR** dextrose bolus 10% 250 mL, 250 mL, IntraVENous, PRN  glucagon injection 1 mg, 1 mg, SubCUTAneous, PRN  dextrose 10 % infusion, , IntraVENous, Continuous PRN    Vitals :     Vitals:    11/29/23 1244   BP:

## 2023-11-29 NOTE — PROGRESS NOTES
Bedside report received from day shift RN. Patient resting comfortably in bed. No signs of discomfort or distress. Bed is in lowest position, wheels locked, 2/2 side rails up. Bed alarm in place for patient safety. Bedside table and call light within reach. White board updated. Will continue to monitor patient. Kahlil Dangelo RN    9:37 PM  Shift assessment complete. (See findings in flowsheet). Med pass complete. (See MAR). VSS. Patient with no complaints at this time. Kahlil Dangelo RN    4:09 AM  No new events overnight. Patient resting quietly in bed. VSS, no pain. No additional needs at this time.  Kahlil Dangelo RN

## 2023-11-29 NOTE — PROGRESS NOTES
[x]No    ------------------------------------------------------------------------------------------------------------------------------------------------------------------------    MDM      [x] High (any 2)    A. Problems (any 1)  [x] Acute/Chronic Illness/injury posing threat to life or bodily function:    [] Severe exacerbation of chronic illness:    ---------------------------------------------------------------------  B. Risk of Treatment (any 1)   [] Drugs/treatments that require intensive monitoring for toxicity include:    [] Change in code status:    [] Decision to escalate care:    [] Major surgery/procedure with associated risk factors:    ----------------------------------------------------------------------  C. Data (any 2)  [x] Discussed current management and discharge planning options with Case Management. [] Discussed management of the case with:    [] Telemetry personally reviewed and interpreted as documented above    [] Imaging personally reviewed and interpreted, includes:    [x] Data Review (any 3)  [] Collateral history obtained from:    [x] All available Consultant notes from yesterday/today were reviewed  [x] All current labs were reviewed and interpreted for clinical significance   [x] Appropriate follow-up labs were ordered    Medications:  Personally reviewed in detail in conjunction w/ labs as documented for evidence of drug toxicity.      Infusion Medications    electrolyte-A 60 mL/hr (11/29/23 1236)    sodium chloride      dextrose       Scheduled Medications    isosorbide mononitrate  30 mg Oral Daily    povidone-iodine   Topical Daily    heparin (porcine)  5,000 Units SubCUTAneous BID    insulin lispro  0-16 Units SubCUTAneous TID WC    insulin lispro  0-4 Units SubCUTAneous Nightly    atorvastatin  40 mg Oral Nightly    aspirin  81 mg Oral Daily    pantoprazole  40 mg Oral Daily    traZODone  200 mg Oral Nightly    ticagrelor  90 mg Oral BID    prazosin  4 mg Oral BID    amLODIPine 10 mg Oral Daily    sodium chloride flush  5-40 mL IntraVENous 2 times per day     PRN Meds: oxyCODONE-acetaminophen, sodium chloride flush, sodium chloride, ondansetron **OR** ondansetron, polyethylene glycol, acetaminophen **OR** acetaminophen, glucose, dextrose bolus **OR** dextrose bolus, glucagon (rDNA), dextrose     Labs:  Personally reviewed and interpreted for clinical significance. Recent Labs     11/27/23  1204 11/29/23  0718   WBC 8.4 4.9   HGB 9.7* 8.9*   HCT 30.9* 27.8*    149       Recent Labs     11/27/23  1204 11/27/23  1324 11/28/23  0646 11/29/23  0718     --   --  135*   K 5.4* 5.9* 5.0 5.2*     --   --  104   CO2 23  --   --  18*   BUN 30*  --   --  27*   CREATININE 1.8*  --   --  1.6*   CALCIUM 9.7  --   --  8.8       Recent Labs     11/28/23  1926 11/29/23  0023 11/29/23  0717   TROPHS 67* 65* 76*       Recent Labs     11/27/23  1900   LABA1C 10.1       Recent Labs     11/27/23  1204 11/27/23  1900 11/29/23  0718   AST 73*  --  34   ALT 66*  --  44*   BILIDIR  --  <0.2  --    BILITOT <0.2  --  0.4   ALKPHOS 358*  --  305*       No results for input(s): \"INR\", \"LACTA\", \"TSH\" in the last 72 hours. Urine Cultures:   Lab Results   Component Value Date/Time    LABURIN  11/02/2021 01:53 PM     <10,000 CFU/ml mixed skin/urogenital katherine. No further workup     Blood Cultures:   Lab Results   Component Value Date/Time    BC No Growth after 4 days of incubation. 03/05/2021 11:13 PM     Lab Results   Component Value Date/Time    BLOODCULT2 No Growth after 4 days of incubation.  03/05/2021 11:13 PM     Organism:   Lab Results   Component Value Date/Time    ORG C. difficile toxin B gene detected 11/03/2023 09:31 AM         SARTHAK Sapp - CNP

## 2023-11-29 NOTE — PROGRESS NOTES
Pt laying awake in bed. Gets up with x 1 assist. Left foot dressing intact with dry dressing. Tolerates a regular diet. VSS. Heat packs applied to knees due to discomfort. IVF's infusing at 60/hr in left arm. Instructed to call out for needs. Call light in reach, will continue to monitor.

## 2023-11-30 LAB
ALBUMIN SERPL-MCNC: 2.9 G/DL (ref 3.4–5)
ALBUMIN/GLOB SERPL: 0.8 {RATIO} (ref 1.1–2.2)
ALP SERPL-CCNC: 257 U/L (ref 40–129)
ALT SERPL-CCNC: 36 U/L (ref 10–40)
AMORPH SED URNS QL MICRO: ABNORMAL /HPF
ANION GAP SERPL CALCULATED.3IONS-SCNC: 7 MMOL/L (ref 3–16)
AST SERPL-CCNC: 22 U/L (ref 15–37)
BACTERIA UR CULT: ABNORMAL
BACTERIA URNS QL MICRO: ABNORMAL /HPF
BILIRUB SERPL-MCNC: 0.5 MG/DL (ref 0–1)
BILIRUB UR QL STRIP.AUTO: NEGATIVE
BUN SERPL-MCNC: 30 MG/DL (ref 7–20)
CALCIUM SERPL-MCNC: 8.7 MG/DL (ref 8.3–10.6)
CHLORIDE SERPL-SCNC: 104 MMOL/L (ref 99–110)
CLARITY UR: CLEAR
CO2 SERPL-SCNC: 25 MMOL/L (ref 21–32)
COLOR UR: YELLOW
CREAT SERPL-MCNC: 1.9 MG/DL (ref 0.6–1.2)
DEPRECATED RDW RBC AUTO: 13.9 % (ref 12.4–15.4)
EPI CELLS #/AREA URNS HPF: ABNORMAL /HPF (ref 0–5)
GFR SERPLBLD CREATININE-BSD FMLA CKD-EPI: 29 ML/MIN/{1.73_M2}
GLUCOSE BLD-MCNC: 119 MG/DL (ref 70–99)
GLUCOSE BLD-MCNC: 324 MG/DL (ref 70–99)
GLUCOSE BLD-MCNC: 351 MG/DL (ref 70–99)
GLUCOSE BLD-MCNC: 391 MG/DL (ref 70–99)
GLUCOSE BLD-MCNC: 42 MG/DL (ref 70–99)
GLUCOSE BLD-MCNC: 92 MG/DL (ref 70–99)
GLUCOSE SERPL-MCNC: 471 MG/DL (ref 70–99)
GLUCOSE UR STRIP.AUTO-MCNC: >=1000 MG/DL
HCT VFR BLD AUTO: 25.4 % (ref 36–48)
HGB BLD-MCNC: 8.1 G/DL (ref 12–16)
HGB UR QL STRIP.AUTO: ABNORMAL
KETONES UR STRIP.AUTO-MCNC: NEGATIVE MG/DL
LEUKOCYTE ESTERASE UR QL STRIP.AUTO: NEGATIVE
MCH RBC QN AUTO: 27.5 PG (ref 26–34)
MCHC RBC AUTO-ENTMCNC: 32 G/DL (ref 31–36)
MCV RBC AUTO: 86 FL (ref 80–100)
NITRITE UR QL STRIP.AUTO: POSITIVE
ORGANISM: ABNORMAL
PERFORMED ON: ABNORMAL
PERFORMED ON: NORMAL
PH UR STRIP.AUTO: 5.5 [PH] (ref 5–8)
PLATELET # BLD AUTO: 153 K/UL (ref 135–450)
PMV BLD AUTO: 9.8 FL (ref 5–10.5)
POTASSIUM SERPL-SCNC: 5.3 MMOL/L (ref 3.5–5.1)
PROT SERPL-MCNC: 6.7 G/DL (ref 6.4–8.2)
PROT UR STRIP.AUTO-MCNC: 100 MG/DL
RBC # BLD AUTO: 2.95 M/UL (ref 4–5.2)
RBC #/AREA URNS HPF: ABNORMAL /HPF (ref 0–4)
SODIUM SERPL-SCNC: 136 MMOL/L (ref 136–145)
SP GR UR STRIP.AUTO: 1.02 (ref 1–1.03)
UA DIPSTICK W REFLEX MICRO PNL UR: YES
URN SPEC COLLECT METH UR: ABNORMAL
UROBILINOGEN UR STRIP-ACNC: 0.2 E.U./DL
WBC # BLD AUTO: 5.1 K/UL (ref 4–11)
WBC #/AREA URNS HPF: ABNORMAL /HPF (ref 0–5)

## 2023-11-30 PROCEDURE — 87077 CULTURE AEROBIC IDENTIFY: CPT

## 2023-11-30 PROCEDURE — 86038 ANTINUCLEAR ANTIBODIES: CPT

## 2023-11-30 PROCEDURE — 87086 URINE CULTURE/COLONY COUNT: CPT

## 2023-11-30 PROCEDURE — 2580000003 HC RX 258: Performed by: STUDENT IN AN ORGANIZED HEALTH CARE EDUCATION/TRAINING PROGRAM

## 2023-11-30 PROCEDURE — 6360000002 HC RX W HCPCS: Performed by: NURSE PRACTITIONER

## 2023-11-30 PROCEDURE — 2580000003 HC RX 258: Performed by: NURSE PRACTITIONER

## 2023-11-30 PROCEDURE — 96367 TX/PROPH/DG ADDL SEQ IV INF: CPT

## 2023-11-30 PROCEDURE — 85027 COMPLETE CBC AUTOMATED: CPT

## 2023-11-30 PROCEDURE — G0378 HOSPITAL OBSERVATION PER HR: HCPCS

## 2023-11-30 PROCEDURE — 36415 COLL VENOUS BLD VENIPUNCTURE: CPT

## 2023-11-30 PROCEDURE — 84075 ASSAY ALKALINE PHOSPHATASE: CPT

## 2023-11-30 PROCEDURE — 6370000000 HC RX 637 (ALT 250 FOR IP): Performed by: STUDENT IN AN ORGANIZED HEALTH CARE EDUCATION/TRAINING PROGRAM

## 2023-11-30 PROCEDURE — 87186 SC STD MICRODIL/AGAR DIL: CPT

## 2023-11-30 PROCEDURE — 80053 COMPREHEN METABOLIC PANEL: CPT

## 2023-11-30 PROCEDURE — 83516 IMMUNOASSAY NONANTIBODY: CPT

## 2023-11-30 PROCEDURE — 81001 URINALYSIS AUTO W/SCOPE: CPT

## 2023-11-30 PROCEDURE — 96372 THER/PROPH/DIAG INJ SC/IM: CPT

## 2023-11-30 PROCEDURE — 6370000000 HC RX 637 (ALT 250 FOR IP): Performed by: NURSE PRACTITIONER

## 2023-11-30 PROCEDURE — 6370000000 HC RX 637 (ALT 250 FOR IP): Performed by: INTERNAL MEDICINE

## 2023-11-30 PROCEDURE — 84080 ASSAY ALKALINE PHOSPHATASES: CPT

## 2023-11-30 RX ORDER — DIPHENHYDRAMINE HYDROCHLORIDE 50 MG/ML
25 INJECTION INTRAMUSCULAR; INTRAVENOUS EVERY 6 HOURS PRN
Status: DISCONTINUED | OUTPATIENT
Start: 2023-11-30 | End: 2023-12-01 | Stop reason: HOSPADM

## 2023-11-30 RX ORDER — LACTOBACILLUS RHAMNOSUS GG 10B CELL
1 CAPSULE ORAL
Status: DISCONTINUED | OUTPATIENT
Start: 2023-12-01 | End: 2023-12-01 | Stop reason: HOSPADM

## 2023-11-30 RX ORDER — INSULIN LISPRO 100 [IU]/ML
0-4 INJECTION, SOLUTION INTRAVENOUS; SUBCUTANEOUS NIGHTLY
Status: DISCONTINUED | OUTPATIENT
Start: 2023-11-30 | End: 2023-12-01 | Stop reason: HOSPADM

## 2023-11-30 RX ORDER — INSULIN GLARGINE 100 [IU]/ML
10 INJECTION, SOLUTION SUBCUTANEOUS DAILY
Status: DISCONTINUED | OUTPATIENT
Start: 2023-11-30 | End: 2023-12-01 | Stop reason: HOSPADM

## 2023-11-30 RX ORDER — INSULIN LISPRO 100 [IU]/ML
0-8 INJECTION, SOLUTION INTRAVENOUS; SUBCUTANEOUS
Status: DISCONTINUED | OUTPATIENT
Start: 2023-11-30 | End: 2023-12-01 | Stop reason: HOSPADM

## 2023-11-30 RX ORDER — INSULIN GLARGINE 100 [IU]/ML
15 INJECTION, SOLUTION SUBCUTANEOUS DAILY
Status: DISCONTINUED | OUTPATIENT
Start: 2023-11-30 | End: 2023-11-30

## 2023-11-30 RX ADMIN — TICAGRELOR 90 MG: 90 TABLET ORAL at 08:07

## 2023-11-30 RX ADMIN — INSULIN LISPRO 16 UNITS: 100 INJECTION, SOLUTION INTRAVENOUS; SUBCUTANEOUS at 08:08

## 2023-11-30 RX ADMIN — INSULIN LISPRO 4 UNITS: 100 INJECTION, SOLUTION INTRAVENOUS; SUBCUTANEOUS at 20:33

## 2023-11-30 RX ADMIN — ATORVASTATIN CALCIUM 40 MG: 40 TABLET, FILM COATED ORAL at 20:32

## 2023-11-30 RX ADMIN — INSULIN GLARGINE 10 UNITS: 100 INJECTION, SOLUTION SUBCUTANEOUS at 10:43

## 2023-11-30 RX ADMIN — HEPARIN SODIUM 5000 UNITS: 5000 INJECTION INTRAVENOUS; SUBCUTANEOUS at 20:33

## 2023-11-30 RX ADMIN — Medication 10 ML: at 08:08

## 2023-11-30 RX ADMIN — OXYCODONE AND ACETAMINOPHEN 1 TABLET: 7.5; 325 TABLET ORAL at 23:23

## 2023-11-30 RX ADMIN — TICAGRELOR 90 MG: 90 TABLET ORAL at 20:32

## 2023-11-30 RX ADMIN — TRAZODONE HYDROCHLORIDE 200 MG: 50 TABLET ORAL at 20:33

## 2023-11-30 RX ADMIN — AMLODIPINE BESYLATE 10 MG: 5 TABLET ORAL at 08:07

## 2023-11-30 RX ADMIN — Medication 10 ML: at 20:35

## 2023-11-30 RX ADMIN — HEPARIN SODIUM 5000 UNITS: 5000 INJECTION INTRAVENOUS; SUBCUTANEOUS at 08:07

## 2023-11-30 RX ADMIN — Medication: at 23:02

## 2023-11-30 RX ADMIN — PANTOPRAZOLE SODIUM 40 MG: 40 TABLET, DELAYED RELEASE ORAL at 08:07

## 2023-11-30 RX ADMIN — CEFAZOLIN 1000 MG: 1 INJECTION, POWDER, FOR SOLUTION INTRAMUSCULAR; INTRAVENOUS at 23:15

## 2023-11-30 RX ADMIN — ISOSORBIDE MONONITRATE 30 MG: 30 TABLET, EXTENDED RELEASE ORAL at 08:07

## 2023-11-30 RX ADMIN — ASPIRIN 81 MG: 81 TABLET, CHEWABLE ORAL at 08:07

## 2023-11-30 RX ADMIN — PRAZOSIN HYDROCHLORIDE 4 MG: 1 CAPSULE ORAL at 08:07

## 2023-11-30 RX ADMIN — INSULIN LISPRO 16 UNITS: 100 INJECTION, SOLUTION INTRAVENOUS; SUBCUTANEOUS at 12:03

## 2023-11-30 RX ADMIN — PRAZOSIN HYDROCHLORIDE 4 MG: 1 CAPSULE ORAL at 20:33

## 2023-11-30 ASSESSMENT — PAIN DESCRIPTION - ORIENTATION: ORIENTATION: OTHER (COMMENT)

## 2023-11-30 ASSESSMENT — PAIN SCALES - GENERAL
PAINLEVEL_OUTOF10: 2
PAINLEVEL_OUTOF10: 3
PAINLEVEL_OUTOF10: 0
PAINLEVEL_OUTOF10: 0

## 2023-11-30 ASSESSMENT — PAIN DESCRIPTION - LOCATION: LOCATION: BACK

## 2023-11-30 ASSESSMENT — PAIN DESCRIPTION - DESCRIPTORS: DESCRIPTORS: ACHING

## 2023-11-30 NOTE — PLAN OF CARE
Problem: Safety - Adult  Goal: Free from fall injury  Outcome: Progressing  Flowsheets (Taken 11/29/2023 2340)  Free From Fall Injury: Instruct family/caregiver on patient safety     Problem: Cardiovascular - Adult  Goal: Maintains optimal cardiac output and hemodynamic stability  Outcome: Progressing  Flowsheets (Taken 11/29/2023 2340)  Maintains optimal cardiac output and hemodynamic stability:   Monitor blood pressure and heart rate   Monitor urine output and notify Licensed Independent Practitioner for values outside of normal range   Assess for signs of decreased cardiac output  Goal: Absence of cardiac dysrhythmias or at baseline  Outcome: Progressing  Flowsheets (Taken 11/29/2023 2340)  Absence of cardiac dysrhythmias or at baseline:   Monitor cardiac rate and rhythm   Assess for signs of decreased cardiac output     Problem: Skin/Tissue Integrity - Adult  Goal: Skin integrity remains intact  Outcome: Progressing  Flowsheets (Taken 11/29/2023 2340)  Skin Integrity Remains Intact: Monitor for areas of redness and/or skin breakdown  Goal: Incisions, wounds, or drain sites healing without S/S of infection  Outcome: Progressing

## 2023-11-30 NOTE — CARE COORDINATION
Case Management/Follow up:    Chart reviewed for length of stay. Hospital day # 3   Unit: A1  Diagnosis and current status as per MD progress:Atypical chest pain   Anticipated d/c date:1-2 days  Expected plan for discharge:Home with home care  Potential barriers:GI, Neph, cards, podiatry recs    Comments: Patient here for CP. Cards, Neph, PT/OT and Podiatry following. Stress test ordered. Therapy recommendations are 24 hr sup/assist w/HHC. CM discussed therapy recs and if she would be open for these services. Patient is agreeable to [facility]. Refuses SNF option as well. Patient indep PLOF and lives alone in an apartment with 16 steps to apt. Patient owns rolling walker and shower chair w/back. Patient stated that her son can help with care if needed. Transport provided by her insurance. Patient stated only need is home care. Cm will continue to support.

## 2023-11-30 NOTE — PROGRESS NOTES
Hospital Medicine Progress Note      Date of Admission: 11/27/2023  Hospital Day: 4    Chief Admission Complaint:  CP     Subjective:  no cp/sob/abd pain, no events overnight    Presenting Admission History:       Patient reports having symptoms of chest pain going on since yesterday which got worse to the point she wanted to come to the hospital. Patient has not been eating well for the last 2 to 3 days feeling like having nausea. She denies shortness of breath, leg pain, leg swelling dizziness, lightheadedness, diarrhea, and constipation. Patient did have chronic episodes of diarrhea going on since last month , CT scan 9/2023-showed mild infectious or inflammatory enterocolitis with associated ileus at that time patient was treated with course of vanco po. She denies  episodes of diarrhea right now admitted for atypical chest pain, ACS rule out on top of that hyperkalemia needs to be managed. Admitted to the observation status    Assessment/Plan:      Current Principal Problem:  Atypical chest pain    Atypical chest pain ACS rule out: troponin 39-17-73. EKG showed signs of nonspecific ST and T changes. Cardiac risk factors include nonspecific ST and T changes. Heart score of 5. Echocardiogram EF 55-60%, no wma. Continue aspirin/statin/brilinta. Placed on telemetry. Lexiscan myoview: No definitive evidence of myocardial ischemia or scar. There is a small-moderate sized inferior defect, favored to be secondary to subdiaphragmatic attenuation artifact. Low normal left ventricular systolic function with calculated LVEF of 54%. Overall findings represent a low risk scan. Hyperkalemia-in the setting of SHARRON on CKD IIIa: Nephrology has been consulted. Continue to monitor with telemetry in place. Monitor BMP. Creatinine baseline ~1-1.3. Plasmalyte IV-dc'd    Abnl UA-st cath, nitrite +, no uti s/s. prev clean catch ua + staph lugdunensis-possibly contaminant. Will start rocephin, probiotic.  Hx of recent aspirin  81 mg Oral Daily    pantoprazole  40 mg Oral Daily    traZODone  200 mg Oral Nightly    ticagrelor  90 mg Oral BID    prazosin  4 mg Oral BID    amLODIPine  10 mg Oral Daily    sodium chloride flush  5-40 mL IntraVENous 2 times per day     PRN Meds: oxyCODONE-acetaminophen, sodium chloride flush, sodium chloride, ondansetron **OR** ondansetron, polyethylene glycol, acetaminophen **OR** acetaminophen, glucose, dextrose bolus **OR** dextrose bolus, glucagon (rDNA), dextrose     Labs:  Personally reviewed and interpreted for clinical significance. Recent Labs     11/29/23 0718 11/30/23  0648   WBC 4.9 5.1   HGB 8.9* 8.1*   HCT 27.8* 25.4*    153       Recent Labs     11/28/23  0646 11/29/23  0718 11/30/23  0648   NA  --  135* 136   K 5.0 5.2* 5.3*   CL  --  104 104   CO2  --  18* 25   BUN  --  27* 30*   CREATININE  --  1.6* 1.9*   CALCIUM  --  8.8 8.7       Recent Labs     11/28/23  1926 11/29/23  0023 11/29/23  0717   TROPHS 67* 65* 76*       Recent Labs     11/27/23  1900   LABA1C 10.1       Recent Labs     11/27/23  1900 11/29/23 0718 11/30/23  0648   AST  --  34 22   ALT  --  44* 36   BILIDIR <0.2  --   --    BILITOT  --  0.4 0.5   ALKPHOS  --  305* 257*       No results for input(s): \"INR\", \"LACTA\", \"TSH\" in the last 72 hours. Urine Cultures:   Lab Results   Component Value Date/Time    LABURIN >100,000 CFU/ml  Sensitivity to follow   11/28/2023 10:35 PM     Blood Cultures:   Lab Results   Component Value Date/Time    BC No Growth after 4 days of incubation. 03/05/2021 11:13 PM     Lab Results   Component Value Date/Time    BLOODCULT2 No Growth after 4 days of incubation.  03/05/2021 11:13 PM     Organism:   Lab Results   Component Value Date/Time    ORG Staphylococcus lugdunensis 11/28/2023 10:35 PM         Deadra [place], APRN - CNP

## 2023-11-30 NOTE — PROGRESS NOTES
Reason for admission:                 Atypical chest pain    Brief Summary :     Ashlee Camara is being seen by nephrology for CKD/hyperkalemia. She is known to have right hemicolectomy, nephrectomy for Wilms tumor presented with atypical chest pain. Has chronic diarrhea.   She is found to have hyperkalemia and rising creatinine      Interval History and plan:      Creatinine tends to be variable now went up to 1.9 again  Likely due to hyperglycemia with glucose more than 470 this morning  Came down to 391  Also high potassium is contributed by hyperglycemia  Correction of glucose will help the potassium so [facility] at [facility]  She is also asked to decrease the amount of potassium in her diet  Her blood pressure was controlled yesterday but getting better now  Not sure what it went up  Will put her on low sodium diet which will help blood pressure also                     Assessment :     Hyperkalemia  Creatinine was high on admission getting better  Counseled about low potassium diet she was taking a lot of bananas and also was taking a lot of tomoto  Sauce etc, she is aware of high potassium content in those food now  Also high K is due to SHARRON    CKD Stage III a with SHARRON  Chronic kidney disease stage IIIa  Creatinine baseline about 1-1.3  Has history of frequent SHARRON due to chronic diarrhea and due to colectomy  Also has reduced renal mass-had nephrectomy due to Wilms tumor  Diabetes also contributing to CKD  History of proteinuria  Renal imaging-absent right kidney  2D echo: 9/21-if EF normal, no mention of diastolic dysfunction    Hypertension   BP: (155-159)/(63-66)  Pulse:  [65-70]   BP goal inpatient [phone] systolic inpatient        [facility] would like to thank Mayo Causey MD   for opportunity to serve this patient      Please call with questions at-   24 Hrs Answering service [phone] or  7 am- 5 pm via Perfect serve or cell phone  Dr.Sudhir Zaira Doll,

## 2023-11-30 NOTE — CONSULTS
(CALTRATE) 600-400 MG-UNIT TABS per tab, Take 1 tablet by mouth daily  aspirin 81 MG tablet, Take 1 tablet by mouth daily    Medication:   insulin glargine  10 Units SubCUTAneous Daily    isosorbide mononitrate  30 mg Oral Daily    povidone-iodine   Topical Daily    heparin (porcine)  5,000 Units SubCUTAneous BID    insulin lispro  0-16 Units SubCUTAneous TID WC    insulin lispro  0-4 Units SubCUTAneous Nightly    atorvastatin  40 mg Oral Nightly    aspirin  81 mg Oral Daily    pantoprazole  40 mg Oral Daily    traZODone  200 mg Oral Nightly    ticagrelor  90 mg Oral BID    prazosin  4 mg Oral BID    amLODIPine  10 mg Oral Daily    sodium chloride flush  5-40 mL IntraVENous 2 times per day      sodium chloride      dextrose         Allergies: Allergies   Allergen Reactions    Morphine Anaphylaxis and Hives     feels like throat is closing    Penicillins Hives and Swelling    Codeine Hives and Rash    Penicillin G Rash       Immunizations:  Immunization History   Administered Date(s) Administered    COVID-19, MODERNA BLUE border, Primary or Immunocompromised, (age 12y+), IM, 100 mcg/0.5mL 05/04/2021, 06/01/2021    Influenza, FLUARIX, FLULAVAL, FLUZONE (age 10 mo+) AND AFLURIA, (age 1 y+), PF, 0.5mL 10/11/2016, 01/23/2020    Influenza, FLUCELVAX, (age 10 mo+), MDCK, PF, 0.5mL 09/21/2017    TDaP, ADACEL (age 6y-58y), [facility] (age 10y+), IM, 0.5mL 10/07/2020       Family history, past medical history, and  social  history  are  reviewed as  below.   Past Medical  History:  Past Medical History:   Diagnosis Date    Abnormal brain MRI 7/20/2017    Partially empty sella and minimal chronic small vessel ischemic disease    Acute bilateral low back pain without sciatica 11/2/2016    SHARRON (acute kidney injury) ([address]) 7/5/2017    Arthritis     back    Bipolar disorder ([address]) 10/18/2008    CAD (coronary artery disease)     stent placed 6/8/20    Cancer ([address]) 2015    bilateral breast:s/p lumpectomy/radiation:under care care of with a nuclear medicine HIDA scan. 2. Patient status post right nephrectomy. 3. Poor visualization of the pancreas. 4. Unremarkable sonographic appearance of the liver, common bile duct, and spleen. XR CHEST (2 VW)    Result Date: 11/27/2023  No radiographic evidence of acute pulmonary disease. Assessment:  60year old female with past medical history of DM, CKD, HTN, Wilm's tumor s/p right nephrectomy, breast cancer 2014 s/p radiation and lumpectomy, CAD on Brilinta/ASA, depression, anxiety, chronic anemia and invasive adenocarcinoma of the right colon s/p rt colectomy 3/2023, and C. Diff 11/2023. Epigastric pain. Worse with eating. Cardiac work-up shows nothing acute. US equivocal for cholecystitis. EGD 1/2022 unremarkable. 2. Chronic elevated LFTs, isolated elevated alk phos. 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 Recent CT and US show no biliary dilation. Could be related to her epigastric pain. Elevated GGT indicates hepatic origin - infiltrative liver disease, PSC, PBC. Although chronic, given her hx of breast and colon cancer metastatic disease possible. Plan:  1. Do not feel HIDA scan necessary. 2. Check alk phos isoenzymes, RHONDA, M2ab. Based on imaging studies prior to September, would have been reasonable to pursue endoscopic US, especially given her history of multiple cancers. Although double ductal sign not appreciated on US will obtain MRI abdomen/MRCP. If ductal dilation is seen she needs EUS. Thank you for permitting us to participate in the care of your patient. Please do not hesitate to call with questions or concerns. 1.  The patient indicates understanding of these issues and agrees with the plan. 2.  I reviewed the patient's medical information and medical history.    3.  I have

## 2023-12-01 ENCOUNTER — APPOINTMENT (OUTPATIENT)
Dept: MRI IMAGING | Age: 63
End: 2023-12-01
Payer: MEDICAID

## 2023-12-01 VITALS
BODY MASS INDEX: 19.67 KG/M2 | DIASTOLIC BLOOD PRESSURE: 63 MMHG | SYSTOLIC BLOOD PRESSURE: 104 MMHG | RESPIRATION RATE: 16 BRPM | HEIGHT: 63 IN | TEMPERATURE: 99.2 F | OXYGEN SATURATION: 98 % | WEIGHT: 111 LBS | HEART RATE: 74 BPM

## 2023-12-01 LAB
ALBUMIN SERPL-MCNC: 2.9 G/DL (ref 3.4–5)
ALBUMIN/GLOB SERPL: 0.7 {RATIO} (ref 1.1–2.2)
ALP SERPL-CCNC: 248 U/L (ref 40–129)
ALT SERPL-CCNC: 31 U/L (ref 10–40)
ANA SER QL IA: NEGATIVE
ANION GAP SERPL CALCULATED.3IONS-SCNC: 8 MMOL/L (ref 3–16)
AST SERPL-CCNC: 22 U/L (ref 15–37)
BILIRUB SERPL-MCNC: 0.4 MG/DL (ref 0–1)
BUN SERPL-MCNC: 30 MG/DL (ref 7–20)
CALCIUM SERPL-MCNC: 8.6 MG/DL (ref 8.3–10.6)
CHLORIDE SERPL-SCNC: 106 MMOL/L (ref 99–110)
CO2 SERPL-SCNC: 22 MMOL/L (ref 21–32)
CREAT SERPL-MCNC: 1.7 MG/DL (ref 0.6–1.2)
DEPRECATED RDW RBC AUTO: 13.7 % (ref 12.4–15.4)
GFR SERPLBLD CREATININE-BSD FMLA CKD-EPI: 33 ML/MIN/{1.73_M2}
GLUCOSE BLD-MCNC: 198 MG/DL (ref 70–99)
GLUCOSE BLD-MCNC: 219 MG/DL (ref 70–99)
GLUCOSE BLD-MCNC: 281 MG/DL (ref 70–99)
GLUCOSE SERPL-MCNC: 225 MG/DL (ref 70–99)
HCT VFR BLD AUTO: 25.9 % (ref 36–48)
HGB BLD-MCNC: 8.3 G/DL (ref 12–16)
MCH RBC QN AUTO: 27.3 PG (ref 26–34)
MCHC RBC AUTO-ENTMCNC: 31.8 G/DL (ref 31–36)
MCV RBC AUTO: 86 FL (ref 80–100)
PERFORMED ON: ABNORMAL
PLATELET # BLD AUTO: 186 K/UL (ref 135–450)
PMV BLD AUTO: 10.2 FL (ref 5–10.5)
POTASSIUM SERPL-SCNC: 5 MMOL/L (ref 3.5–5.1)
PROT SERPL-MCNC: 6.8 G/DL (ref 6.4–8.2)
RBC # BLD AUTO: 3.02 M/UL (ref 4–5.2)
SODIUM SERPL-SCNC: 136 MMOL/L (ref 136–145)
WBC # BLD AUTO: 6.2 K/UL (ref 4–11)

## 2023-12-01 PROCEDURE — 6370000000 HC RX 637 (ALT 250 FOR IP): Performed by: NURSE PRACTITIONER

## 2023-12-01 PROCEDURE — 36415 COLL VENOUS BLD VENIPUNCTURE: CPT

## 2023-12-01 PROCEDURE — 97535 SELF CARE MNGMENT TRAINING: CPT

## 2023-12-01 PROCEDURE — 74181 MRI ABDOMEN W/O CONTRAST: CPT

## 2023-12-01 PROCEDURE — 80053 COMPREHEN METABOLIC PANEL: CPT

## 2023-12-01 PROCEDURE — G0378 HOSPITAL OBSERVATION PER HR: HCPCS

## 2023-12-01 PROCEDURE — 2580000003 HC RX 258: Performed by: STUDENT IN AN ORGANIZED HEALTH CARE EDUCATION/TRAINING PROGRAM

## 2023-12-01 PROCEDURE — 6360000002 HC RX W HCPCS: Performed by: NURSE PRACTITIONER

## 2023-12-01 PROCEDURE — 85027 COMPLETE CBC AUTOMATED: CPT

## 2023-12-01 PROCEDURE — 96372 THER/PROPH/DIAG INJ SC/IM: CPT

## 2023-12-01 PROCEDURE — 96366 THER/PROPH/DIAG IV INF ADDON: CPT

## 2023-12-01 PROCEDURE — 6370000000 HC RX 637 (ALT 250 FOR IP): Performed by: INTERNAL MEDICINE

## 2023-12-01 PROCEDURE — 97116 GAIT TRAINING THERAPY: CPT

## 2023-12-01 PROCEDURE — 97530 THERAPEUTIC ACTIVITIES: CPT

## 2023-12-01 PROCEDURE — 2580000003 HC RX 258: Performed by: NURSE PRACTITIONER

## 2023-12-01 PROCEDURE — 6370000000 HC RX 637 (ALT 250 FOR IP): Performed by: STUDENT IN AN ORGANIZED HEALTH CARE EDUCATION/TRAINING PROGRAM

## 2023-12-01 RX ORDER — CEFDINIR 300 MG/1
300 CAPSULE ORAL DAILY
Qty: 5 CAPSULE | Refills: 0 | Status: SHIPPED | OUTPATIENT
Start: 2023-12-01 | End: 2023-12-06

## 2023-12-01 RX ORDER — LACTULOSE 10 G/15ML
20 SOLUTION ORAL 2 TIMES DAILY
Status: DISCONTINUED | OUTPATIENT
Start: 2023-12-01 | End: 2023-12-01 | Stop reason: HOSPADM

## 2023-12-01 RX ORDER — LACTOBACILLUS RHAMNOSUS GG 10B CELL
1 CAPSULE ORAL
Qty: 30 CAPSULE | Refills: 0 | Status: ON HOLD | OUTPATIENT
Start: 2023-12-02

## 2023-12-01 RX ORDER — PSEUDOEPHEDRINE HCL 30 MG
100 TABLET ORAL DAILY
Qty: 30 CAPSULE | Refills: 0 | Status: ON HOLD | OUTPATIENT
Start: 2023-12-02

## 2023-12-01 RX ORDER — BISACODYL 10 MG
10 SUPPOSITORY, RECTAL RECTAL DAILY
Status: DISCONTINUED | OUTPATIENT
Start: 2023-12-01 | End: 2023-12-01 | Stop reason: HOSPADM

## 2023-12-01 RX ORDER — ATORVASTATIN CALCIUM 40 MG/1
40 TABLET, FILM COATED ORAL NIGHTLY
Qty: 30 TABLET | Refills: 3 | Status: ON HOLD | OUTPATIENT
Start: 2023-12-01

## 2023-12-01 RX ORDER — LACTULOSE 10 G/15ML
20 SOLUTION ORAL 2 TIMES DAILY
Qty: 1800 ML | Refills: 0 | Status: ON HOLD | OUTPATIENT
Start: 2023-12-01 | End: 2023-12-31

## 2023-12-01 RX ORDER — ISOSORBIDE MONONITRATE 30 MG/1
30 TABLET, EXTENDED RELEASE ORAL DAILY
Qty: 30 TABLET | Refills: 3 | Status: ON HOLD | OUTPATIENT
Start: 2023-12-02

## 2023-12-01 RX ORDER — DOCUSATE SODIUM 100 MG/1
100 CAPSULE, LIQUID FILLED ORAL DAILY
Status: DISCONTINUED | OUTPATIENT
Start: 2023-12-01 | End: 2023-12-01 | Stop reason: HOSPADM

## 2023-12-01 RX ADMIN — ISOSORBIDE MONONITRATE 30 MG: 30 TABLET, EXTENDED RELEASE ORAL at 10:06

## 2023-12-01 RX ADMIN — HEPARIN SODIUM 5000 UNITS: 5000 INJECTION INTRAVENOUS; SUBCUTANEOUS at 10:06

## 2023-12-01 RX ADMIN — ASPIRIN 81 MG: 81 TABLET, CHEWABLE ORAL at 10:06

## 2023-12-01 RX ADMIN — INSULIN GLARGINE 10 UNITS: 100 INJECTION, SOLUTION SUBCUTANEOUS at 10:07

## 2023-12-01 RX ADMIN — BISACODYL 10 MG: 10 SUPPOSITORY RECTAL at 11:44

## 2023-12-01 RX ADMIN — TICAGRELOR 90 MG: 90 TABLET ORAL at 10:06

## 2023-12-01 RX ADMIN — PRAZOSIN HYDROCHLORIDE 4 MG: 1 CAPSULE ORAL at 10:06

## 2023-12-01 RX ADMIN — DOCUSATE SODIUM 100 MG: 100 CAPSULE, LIQUID FILLED ORAL at 11:44

## 2023-12-01 RX ADMIN — INSULIN LISPRO 2 UNITS: 100 INJECTION, SOLUTION INTRAVENOUS; SUBCUTANEOUS at 16:42

## 2023-12-01 RX ADMIN — PANTOPRAZOLE SODIUM 40 MG: 40 TABLET, DELAYED RELEASE ORAL at 10:06

## 2023-12-01 RX ADMIN — AMLODIPINE BESYLATE 10 MG: 5 TABLET ORAL at 10:06

## 2023-12-01 RX ADMIN — CEFAZOLIN 1000 MG: 1 INJECTION, POWDER, FOR SOLUTION INTRAMUSCULAR; INTRAVENOUS at 10:09

## 2023-12-01 RX ADMIN — INSULIN LISPRO 4 UNITS: 100 INJECTION, SOLUTION INTRAVENOUS; SUBCUTANEOUS at 10:07

## 2023-12-01 RX ADMIN — LACTULOSE 20 G: 20 SOLUTION ORAL at 11:44

## 2023-12-01 RX ADMIN — Medication 1 CAPSULE: at 10:05

## 2023-12-01 RX ADMIN — Medication 10 ML: at 10:07

## 2023-12-01 NOTE — PROGRESS NOTES
Reason for admission:                 Atypical chest pain    Brief Summary :     Gene Watson is being seen by nephrology for CKD/hyperkalemia. She is known to have right hemicolectomy, nephrectomy for Wilms tumor presented with atypical chest pain. Has chronic diarrhea. She is found to have hyperkalemia and rising creatinine. Known to have chronic pancreatitis. Also ampulla stenosis.   Followed by GI      Interval History and plan:      Very high blood sugars yesterday coming down now potassium coming down is printed  Correction of blood sugar  Creatinine is coming down encourage hydration  GI planning to do workup as an outpatient  Potassium 5, CO2 22  Blood pressure is overall good                   Assessment :     Hyperkalemia  Creatinine was high on admission getting better  Counseled about low potassium diet she was taking a lot of bananas and also was taking a lot of tomoto  Sauce etc, she is aware of high potassium content in those food now  Also high K is due to SHARRON    CKD Stage III a with SHARRON  Chronic kidney disease stage IIIa  Creatinine baseline about 1-1.3  Has history of frequent SHARRON due to chronic diarrhea and due to colectomy  Also has reduced renal mass-had nephrectomy due to Wilms tumor  Diabetes also contributing to CKD  History of proteinuria  Renal imaging-absent right kidney  2D echo: 9/21-if EF normal, no mention of diastolic dysfunction    Hypertension   BP: (144)/(82)  Pulse:  [63]   BP goal inpatient 005-414 systolic inpatient        [facility] would like to thank Sophia Taylor MD   for opportunity to serve this patient      Please call with questions at-   24 Hrs Answering service [phone] or  7 am- 5 pm via Perfect serve or cell phone  Maria Elena Galaviz MD       HPI :     Gene Watson is a 58 y.o. female presented to   the hospital on 11/27/2023 with known chronic kidney disease stage IIIa, colon cancer status post right hemicolectomy CAD diabetes hypertension and known chronic diarrhea presented with pain in the chest.  No radiation to neck or arm, no shortness of breath. Also having nausea. Did not eat very well for last several days. Having episodes of diarrhea. Came to the hospital.  Being ruled out for ACS and in the meantime we are consulted for CKD and hyperkalemia. PMH/PSH/SH/Family History:     Past Medical History:   Diagnosis Date    Abnormal brain MRI 7/20/2017    Partially empty sella and minimal chronic small vessel ischemic disease    Acute bilateral low back pain without sciatica 11/2/2016    SHARRON (acute kidney injury) ([address]) 7/5/2017    Arthritis     back    Bipolar disorder ([address]) 10/18/2008    CAD (coronary artery disease)     stent placed 6/8/20    Cancer ([address]) 2015    bilateral breast:s/p lumpectomy/radiation:under care care of breast specialist:Dr. Boone     Carotid stenosis, bilateral:<50%:per US 7/2016 7/15/2016    Carpal tunnel syndrome 10/18/2008    Cervical cancer screening 2014    Nml per pt'. Coronary artery disease of native artery of native heart with stable angina pectoris ([address]) 6/9/2020    DDD (degenerative disc disease), lumbar 7/18/2018    Depression     under care of pschiatrist:Dr. Shields Shows    Depression/anxiety 7/5/2017    Depression/anxiety     Diabetes mellitus ([address])     Gout     History of mammogram 10/28/2016;8/14/17    Negative    History of therapeutic radiation     Hyperlipidemia     Hypertension     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 stage 3 chronic kidney disease ([address]) 9/17/2017    Microalbuminuria 7/1/2016    Neuropathy in diabetes [facility])     Non morbid obesity 7/1/2016    Pancreatitis 5/12/16    [facility] hospitalization 5/12/16-5/16/16:under care of GI:chronic pancreatitis    S/P endoscopy 6/14/2016    [facility]:per pt' & her family member was nml.     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 myelopathy or radiculopathy 3/10/2017    Transient cerebral

## 2023-12-01 NOTE — CARE COORDINATION
CASE MANAGEMENT DISCHARGE SUMMARY      Discharge to: home w/[facility]            Transportation:    Family/car:private      Confirmed discharge plan with:     Patient: yes     Family:  yes:per patient     Facility/Agency, name:   [place] [address]  [phone]  CHINO/AVS faxed        RN, name: Cecily Bernabe      Note: Discharging nurse to complete CHINO, reconcile AVS, and place final copy with patient's discharge packet. RN to ensure that written prescriptions for  Level II medications are sent with patient to the facility as per protocol.

## 2023-12-01 NOTE — DISCHARGE SUMMARY
Hospital Medicine Discharge Summary    Patient: Olvin Lewis   : 1960     Admit Date: 2023   Discharge Date: 23  Disposition:  [x]Home   []HHC  []SNF  []ECF  []Acute Rehab  []LTAC  []Hospice  Code status:  [x]Full  []DNR/CCA  []Limited (DNR/CCA with Do Not Intubate)  []DNRCC  Condition at Discharge: Stable  Primary Care Provider: SARTHAK Avitia NP    Admitting Provider: No admitting provider for patient encounter. Discharge Provider: SARTHAK Leon CNP     Discharge Diagnoses: Active Hospital Problems    Diagnosis     Atypical chest pain [R07.89]        Presenting Admission History:  58 y.o. female with a pmh of colon cancer s/p right hemicolectomy CAD PVD stage IIIa CKD and diabetes mellitus type 2 hypertension hyperlipidemia chronic diarrhea and bipolar disorder who presents with Atypical chest pain . Symptoms of chest pain going on for yesterday it got worse to the point patient wanted to come to the hospital patient has not been eating well for the last 2 to 3 days as well feeling like having nausea. Denies any shortness of breath leg pain leg swelling dizziness lightheadedness diarrhea and constipation. Patient did have chronic episodes of diarrhea going on since last month and was suspected to have CT scan at that time patient was treated. Denies any episodes of diarrhea right now admitted for atypical chest pain ACS rule out on top of that hyperkalemia needs to be managed. Assessment/Plan:      Atypical chest pain in setting of known CAD. HS trop appears to have peaked at 68. EKG w/ non-specific ST-T wave changes. Echo with normal EF and no WMAs:  continue DAPT, statin, isosorbide. Stress test this admission: low-risk scan; small-mod inferior defect favored to be 2/2 attenuation artifact. .      Acute kidney injury on CKD stage 3a. Likely due to chronic diarrhea, history of Wilms tumor. DM likely contributing to CKD.   Baseline creat 1-1.3, on

## 2023-12-01 NOTE — PROGRESS NOTES
Occupational Therapy  Facility/Department: [place] [address] [facility]  Daily Treatment Note  NAME: Tyree Postal  : 1960  MRN: [id number]  Date of Service: 2023    Discharge Recommendations:  24 hour supervision or assist, Home with Home health OT  OT Equipment Recommendations  Equipment Needed: Yes  Mobility Devices: ADL Assistive Devices  ADL Assistive Devices: Grab Bars - toilet;Grab Bars - tub    AM-PAC score  AM-PAC Inpatient Daily Activity Raw Score: 19 (23 1325)  AM-PAC Inpatient ADL T-Scale Score : 40.22 (23 132)  ADL Inpatient CMS 0-100% Score: 42.8 (23)  ADL Inpatient CMS G-Code Modifier : CK (23)    If pt is unable to be seen after this session, please let this note serve as discharge summary. Please see case management note for discharge disposition. Thank you. Patient Diagnosis(es): The primary encounter diagnosis was Precordial pain. Diagnoses of Hyperkalemia, Chronic kidney disease, unspecified CKD stage, and Chronic anemia were also pertinent to this visit. Assessment    Assessment: Pt received for OT session resting in bed on phone to address current functional deficits that inhibit independence and safety with ADLs and functional mobility. Pt agreeable to session, reporting no pain but that she would like to attempt having a BM on toilet. During session, pt completed bed mobility w/ SPV and increased time, functional mobility w/ SBA and RW, transfers w/ SBA and RW, LB dressing w/ modA, and toileting w/ SPV. Pt will continue to benefit from skilled OT while in acute setting to increase functional activity tolerance, safety and independence w/ ADLs, and increase strength. Pt making good progress towards personalized OT goals. Continued recs for home w/ 24hr SPV & HHOT. Continue POC.      Activity Tolerance: Patient tolerated treatment well  Discharge Recommendations: 24 hour supervision or assist;Home with Home health OT  Equipment Needed:

## 2023-12-01 NOTE — CARE COORDINATION
[facility]    Referral received from  to follow for home care services.      Critical access hospital unable to accept  Possible dc this weekend    Referral sent to care connections declined  Referral sent to [facility] accepted by [facility]; office will call patient and pull referral when notified of discharge    Keaton Wheat RN, BSN [phone]  [facility]   [phone] fax [phone]  [facility] [place] [address] [facility] [place] [address] [phone]

## 2023-12-01 NOTE — CARE COORDINATION
Case Management/Follow up:     Chart reviewed for length of stay. Hospital day # 4   Unit: A1  Diagnosis and current status as per MD progress:Atypical chest pain   Anticipated d/c date:1-2 days  Expected plan for discharge:Home with home care  Potential barriers:GI, Neph, cards, podiatry recs     Comments: Patient here for CP. Cards, Neph, PT/OT and Podiatry following. Stress test on 11/28>neg. MRCP. Therapy recommendations are 24 hr sup/assist w/HHC. CM discussed therapy recs and if she would be open for these services. Patient is agreeable to [address]. Refuses SNF option as well. Patient indep PLOF and lives alone in an apartment with 16 steps to apt. Patient owns rolling walker and shower chair w/back. Patient stated that her son can help with care if needed. Transport provided by her insurance. Patient stated only need is home care. Cm will continue to support. Referral to [facility] not take but will help find agency.      Sujey Mullen RN

## 2023-12-01 NOTE — PROGRESS NOTES
Discharge instructions reviewed with client. Client verbalized understanding. Prescriptions at bedside. Waiting on son to pick patient up.

## 2023-12-01 NOTE — PROGRESS NOTES
Physical Therapy  Facility/Department: [place] [address] [facility]  Daily Treatment Note  NAME: Barry Burch  : 1960  MRN: [id number]    Date of Service: 2023    Discharge Recommendations:  24 hour supervision or assist, Home with Home health PT   PT Equipment Recommendations  Equipment Needed: No  Other: pt owns RW    If pt is unable to be seen after this session, please let this note serve as discharge summary. Please see case management note for discharge disposition. Thank you. Patient Diagnosis(es): The primary encounter diagnosis was Precordial pain. Diagnoses of Hyperkalemia, Chronic kidney disease, unspecified CKD stage, and Chronic anemia were also pertinent to this visit. Assessment   Assessment: Pt presents to [facility] for atypical chest pain. Pt presents below baseline for PT treatment with decreased strength, decreased endurance, and decreased mobility. Pt CGA with gait with  ft. Pt did not want to attempt standing without AD. Pt very lathargic this date and had slurred speech upon arrival (pt woke up from nap). Pt would continue to benefit from skilled PT to aid in the above deficits and a safe DC Home with 24 hr A and HHPT when medically appropriate. Activity Tolerance: Patient tolerated evaluation without incident  Equipment Needed: No  Other: pt owns RW            Plan    Physical Therapy Plan  General Plan: 2-3 times per week  Current Treatment Recommendations: Strengthening;Balance training;Functional mobility training;Transfer training; Endurance training;Pain management; Neuromuscular re-education;Stair training;Gait training;Home exercise program;Safety education & training; Therapeutic activities; Patient/Caregiver education & training     Restrictions  Restrictions/Precautions  Restrictions/Precautions: General Precautions, Fall Risk  Lower Extremity Weight Bearing Restrictions  Left Lower Extremity Weight Bearing: Weight Bearing As Tolerated  Partial Weight Bearing

## 2023-12-01 NOTE — PLAN OF CARE
Problem: Discharge Planning  Goal: Discharge to home or other facility with appropriate resources  Outcome: Adequate for Discharge     Problem: Chronic Conditions and Co-morbidities  Goal: Patient's chronic conditions and co-morbidity symptoms are monitored and maintained or improved  Outcome: Adequate for Discharge     Problem: Pain  Goal: Verbalizes/displays adequate comfort level or baseline comfort level  Outcome: Adequate for Discharge  Flowsheets (Taken 12/1/2023 0800)  Verbalizes/displays adequate comfort level or baseline comfort level: Encourage patient to monitor pain and request assistance     Problem: Safety - Adult  Goal: Free from fall injury  Outcome: Adequate for Discharge     Problem: Cardiovascular - Adult  Goal: Maintains optimal cardiac output and hemodynamic stability  Outcome: Adequate for Discharge  Goal: Absence of cardiac dysrhythmias or at baseline  Outcome: Adequate for Discharge     Problem: Skin/Tissue Integrity - Adult  Goal: Skin integrity remains intact  Outcome: Adequate for Discharge  Goal: Incisions, wounds, or drain sites healing without S/S of infection  Outcome: Adequate for Discharge     Problem: Skin/Tissue Integrity  Goal: Absence of new skin breakdown  Description: 1. Monitor for areas of redness and/or skin breakdown  2. Assess vascular access sites hourly  3. Every 4-6 hours minimum:  Change oxygen saturation probe site  4. Every 4-6 hours:  If on nasal continuous positive airway pressure, respiratory therapy assess nares and determine need for appliance change or resting period.   Outcome: Adequate for Discharge

## 2023-12-02 LAB
BACTERIA UR CULT: ABNORMAL
ORGANISM: ABNORMAL

## 2023-12-04 LAB
ALP BONE SERPL-CCNC: 72 U/L (ref 0–55)
ALP ISOS SERPL HS-CCNC: 0 U/L
ALP LIVER SERPL-CCNC: 186 U/L (ref 0–94)
ALP SERPL-CCNC: 258 U/L (ref 40–120)

## 2023-12-08 LAB — MITOCHONDRIA M2 AB SER IA-ACNC: 1 U/ML (ref 0–4)

## 2023-12-10 ENCOUNTER — APPOINTMENT (OUTPATIENT)
Dept: CT IMAGING | Age: 63
DRG: 420 | End: 2023-12-10
Payer: MEDICAID

## 2023-12-10 ENCOUNTER — APPOINTMENT (OUTPATIENT)
Dept: GENERAL RADIOLOGY | Age: 63
DRG: 420 | End: 2023-12-10
Payer: MEDICAID

## 2023-12-10 ENCOUNTER — HOSPITAL ENCOUNTER (INPATIENT)
Age: 63
LOS: 3 days | Discharge: HOME | DRG: 420 | End: 2023-12-13
Attending: EMERGENCY MEDICINE | Admitting: INTERNAL MEDICINE
Payer: MEDICAID

## 2023-12-10 DIAGNOSIS — J18.9 PNEUMONIA DUE TO INFECTIOUS ORGANISM, UNSPECIFIED LATERALITY, UNSPECIFIED PART OF LUNG: ICD-10-CM

## 2023-12-10 DIAGNOSIS — R62.7 FAILURE TO THRIVE IN ADULT: ICD-10-CM

## 2023-12-10 DIAGNOSIS — G89.4 CHRONIC PAIN DISORDER: ICD-10-CM

## 2023-12-10 DIAGNOSIS — G93.40 ACUTE ENCEPHALOPATHY: Primary | ICD-10-CM

## 2023-12-10 PROBLEM — R41.0 CONFUSION: Status: ACTIVE | Noted: 2023-12-10

## 2023-12-10 LAB
ALBUMIN SERPL-MCNC: 3.2 G/DL (ref 3.4–5)
ALBUMIN/GLOB SERPL: 0.8 {RATIO} (ref 1.1–2.2)
ALP SERPL-CCNC: 265 U/L (ref 40–129)
ALT SERPL-CCNC: 34 U/L (ref 10–40)
AMORPH SED URNS QL MICRO: ABNORMAL /HPF
ANION GAP SERPL CALCULATED.3IONS-SCNC: 6 MMOL/L (ref 3–16)
AST SERPL-CCNC: 21 U/L (ref 15–37)
BACTERIA URNS QL MICRO: ABNORMAL /HPF
BASE EXCESS BLDV CALC-SCNC: 1.1 MMOL/L (ref -3–3)
BASOPHILS # BLD: 0 K/UL (ref 0–0.2)
BASOPHILS NFR BLD: 0.1 %
BILIRUB SERPL-MCNC: <0.2 MG/DL (ref 0–1)
BILIRUB UR QL STRIP.AUTO: NEGATIVE
BUN SERPL-MCNC: 25 MG/DL (ref 7–20)
CALCIUM SERPL-MCNC: 8.6 MG/DL (ref 8.3–10.6)
CHLORIDE SERPL-SCNC: 98 MMOL/L (ref 99–110)
CK SERPL-CCNC: 208 U/L (ref 26–192)
CLARITY UR: CLEAR
CO2 BLDV-SCNC: 28 MMOL/L
CO2 SERPL-SCNC: 26 MMOL/L (ref 21–32)
COHGB MFR BLDV: 3.1 % (ref 0–1.5)
COLOR UR: YELLOW
CREAT SERPL-MCNC: 1.8 MG/DL (ref 0.6–1.2)
DEPRECATED RDW RBC AUTO: 15 % (ref 12.4–15.4)
EOSINOPHIL # BLD: 0 K/UL (ref 0–0.6)
EOSINOPHIL NFR BLD: 0.6 %
EPI CELLS #/AREA URNS HPF: ABNORMAL /HPF (ref 0–5)
FLUAV RNA RESP QL NAA+PROBE: NOT DETECTED
FLUBV RNA RESP QL NAA+PROBE: NOT DETECTED
GFR SERPLBLD CREATININE-BSD FMLA CKD-EPI: 31 ML/MIN/{1.73_M2}
GLUCOSE BLD-MCNC: 357 MG/DL (ref 70–99)
GLUCOSE BLD-MCNC: 396 MG/DL (ref 70–99)
GLUCOSE BLD-MCNC: 409 MG/DL (ref 70–99)
GLUCOSE SERPL-MCNC: 400 MG/DL (ref 70–99)
GLUCOSE UR STRIP.AUTO-MCNC: >=1000 MG/DL
HCO3 BLDV-SCNC: 26.8 MMOL/L (ref 23–29)
HCT VFR BLD AUTO: 26.7 % (ref 36–48)
HGB BLD-MCNC: 8.4 G/DL (ref 12–16)
HGB UR QL STRIP.AUTO: ABNORMAL
HYALINE CASTS #/AREA URNS LPF: ABNORMAL /LPF (ref 0–2)
KETONES UR STRIP.AUTO-MCNC: NEGATIVE MG/DL
LACTATE BLDV-SCNC: 1.3 MMOL/L (ref 0.4–2)
LEUKOCYTE ESTERASE UR QL STRIP.AUTO: NEGATIVE
LYMPHOCYTES # BLD: 1.8 K/UL (ref 1–5.1)
LYMPHOCYTES NFR BLD: 27.7 %
MCH RBC QN AUTO: 27.4 PG (ref 26–34)
MCHC RBC AUTO-ENTMCNC: 31.4 G/DL (ref 31–36)
MCV RBC AUTO: 87.4 FL (ref 80–100)
METHGB MFR BLDV: 0.4 %
MONOCYTES # BLD: 0.3 K/UL (ref 0–1.3)
MONOCYTES NFR BLD: 5 %
NEUTROPHILS # BLD: 4.4 K/UL (ref 1.7–7.7)
NEUTROPHILS NFR BLD: 66.6 %
NITRITE UR QL STRIP.AUTO: NEGATIVE
O2 THERAPY: ABNORMAL
PCO2 BLDV: 47.8 MMHG (ref 40–50)
PERFORMED ON: ABNORMAL
PH BLDV: 7.37 [PH] (ref 7.35–7.45)
PH UR STRIP.AUTO: 5.5 [PH] (ref 5–8)
PLATELET # BLD AUTO: 174 K/UL (ref 135–450)
PMV BLD AUTO: 10.1 FL (ref 5–10.5)
PO2 BLDV: 55.5 MMHG (ref 25–40)
POTASSIUM SERPL-SCNC: 4.6 MMOL/L (ref 3.5–5.1)
PROCALCITONIN SERPL IA-MCNC: 0.14 NG/ML (ref 0–0.15)
PROT SERPL-MCNC: 7 G/DL (ref 6.4–8.2)
PROT UR STRIP.AUTO-MCNC: 100 MG/DL
RBC # BLD AUTO: 3.05 M/UL (ref 4–5.2)
RBC #/AREA URNS HPF: ABNORMAL /HPF (ref 0–4)
SAO2 % BLDV: 87 %
SARS-COV-2 RNA RESP QL NAA+PROBE: NOT DETECTED
SODIUM SERPL-SCNC: 130 MMOL/L (ref 136–145)
SP GR UR STRIP.AUTO: >=1.03 (ref 1–1.03)
TROPONIN, HIGH SENSITIVITY: 76 NG/L (ref 0–14)
TROPONIN, HIGH SENSITIVITY: 82 NG/L (ref 0–14)
UA DIPSTICK W REFLEX MICRO PNL UR: YES
URN SPEC COLLECT METH UR: ABNORMAL
UROBILINOGEN UR STRIP-ACNC: 0.2 E.U./DL
WBC # BLD AUTO: 6.6 K/UL (ref 4–11)
WBC #/AREA URNS HPF: ABNORMAL /HPF (ref 0–5)
YEAST URNS QL MICRO: PRESENT /HPF

## 2023-12-10 PROCEDURE — 6370000000 HC RX 637 (ALT 250 FOR IP): Performed by: INTERNAL MEDICINE

## 2023-12-10 PROCEDURE — 82803 BLOOD GASES ANY COMBINATION: CPT

## 2023-12-10 PROCEDURE — 2580000003 HC RX 258: Performed by: INTERNAL MEDICINE

## 2023-12-10 PROCEDURE — 80053 COMPREHEN METABOLIC PANEL: CPT

## 2023-12-10 PROCEDURE — 6360000002 HC RX W HCPCS: Performed by: EMERGENCY MEDICINE

## 2023-12-10 PROCEDURE — 1200000000 HC SEMI PRIVATE

## 2023-12-10 PROCEDURE — 84145 PROCALCITONIN (PCT): CPT

## 2023-12-10 PROCEDURE — 87636 SARSCOV2 & INF A&B AMP PRB: CPT

## 2023-12-10 PROCEDURE — 87040 BLOOD CULTURE FOR BACTERIA: CPT

## 2023-12-10 PROCEDURE — 85025 COMPLETE CBC W/AUTO DIFF WBC: CPT

## 2023-12-10 PROCEDURE — 71045 X-RAY EXAM CHEST 1 VIEW: CPT

## 2023-12-10 PROCEDURE — 82550 ASSAY OF CK (CPK): CPT

## 2023-12-10 PROCEDURE — 6370000000 HC RX 637 (ALT 250 FOR IP): Performed by: NURSE PRACTITIONER

## 2023-12-10 PROCEDURE — 70450 CT HEAD/BRAIN W/O DYE: CPT

## 2023-12-10 PROCEDURE — 2580000003 HC RX 258: Performed by: EMERGENCY MEDICINE

## 2023-12-10 PROCEDURE — 96374 THER/PROPH/DIAG INJ IV PUSH: CPT

## 2023-12-10 PROCEDURE — 6360000002 HC RX W HCPCS: Performed by: INTERNAL MEDICINE

## 2023-12-10 PROCEDURE — 99285 EMERGENCY DEPT VISIT HI MDM: CPT

## 2023-12-10 PROCEDURE — 81001 URINALYSIS AUTO W/SCOPE: CPT

## 2023-12-10 PROCEDURE — 84484 ASSAY OF TROPONIN QUANT: CPT

## 2023-12-10 PROCEDURE — 83605 ASSAY OF LACTIC ACID: CPT

## 2023-12-10 RX ORDER — SODIUM CHLORIDE 9 MG/ML
INJECTION, SOLUTION INTRAVENOUS PRN
Status: DISCONTINUED | OUTPATIENT
Start: 2023-12-10 | End: 2023-12-13 | Stop reason: HOSPADM

## 2023-12-10 RX ORDER — SODIUM CHLORIDE 0.9 % (FLUSH) 0.9 %
5-40 SYRINGE (ML) INJECTION EVERY 12 HOURS SCHEDULED
Status: DISCONTINUED | OUTPATIENT
Start: 2023-12-10 | End: 2023-12-13 | Stop reason: HOSPADM

## 2023-12-10 RX ORDER — DEXTROSE MONOHYDRATE 100 MG/ML
INJECTION, SOLUTION INTRAVENOUS CONTINUOUS PRN
Status: DISCONTINUED | OUTPATIENT
Start: 2023-12-10 | End: 2023-12-13 | Stop reason: HOSPADM

## 2023-12-10 RX ORDER — PANTOPRAZOLE SODIUM 40 MG/1
40 TABLET, DELAYED RELEASE ORAL DAILY
Status: DISCONTINUED | OUTPATIENT
Start: 2023-12-10 | End: 2023-12-13 | Stop reason: HOSPADM

## 2023-12-10 RX ORDER — BISACODYL 5 MG/1
5 TABLET, DELAYED RELEASE ORAL DAILY PRN
Status: DISCONTINUED | OUTPATIENT
Start: 2023-12-10 | End: 2023-12-13 | Stop reason: HOSPADM

## 2023-12-10 RX ORDER — ACETAMINOPHEN 325 MG/1
650 TABLET ORAL EVERY 6 HOURS PRN
Status: DISCONTINUED | OUTPATIENT
Start: 2023-12-10 | End: 2023-12-13 | Stop reason: HOSPADM

## 2023-12-10 RX ORDER — ASPIRIN 81 MG/1
81 TABLET ORAL DAILY
Status: DISCONTINUED | OUTPATIENT
Start: 2023-12-10 | End: 2023-12-13 | Stop reason: HOSPADM

## 2023-12-10 RX ORDER — HEPARIN SODIUM 5000 [USP'U]/ML
5000 INJECTION, SOLUTION INTRAVENOUS; SUBCUTANEOUS 2 TIMES DAILY
Status: DISCONTINUED | OUTPATIENT
Start: 2023-12-10 | End: 2023-12-13 | Stop reason: HOSPADM

## 2023-12-10 RX ORDER — SODIUM CHLORIDE 9 MG/ML
INJECTION, SOLUTION INTRAVENOUS CONTINUOUS
Status: DISCONTINUED | OUTPATIENT
Start: 2023-12-10 | End: 2023-12-13 | Stop reason: HOSPADM

## 2023-12-10 RX ORDER — GABAPENTIN 100 MG/1
200 CAPSULE ORAL 3 TIMES DAILY
Status: DISCONTINUED | OUTPATIENT
Start: 2023-12-10 | End: 2023-12-13 | Stop reason: HOSPADM

## 2023-12-10 RX ORDER — CALCIUM CARBONATE-CHOLECALCIFEROL TAB 250 MG-125 UNIT 250-125 MG-UNIT
1 TAB ORAL DAILY
Status: DISCONTINUED | OUTPATIENT
Start: 2023-12-10 | End: 2023-12-13 | Stop reason: HOSPADM

## 2023-12-10 RX ORDER — MAGNESIUM HYDROXIDE/ALUMINUM HYDROXICE/SIMETHICONE 120; 1200; 1200 MG/30ML; MG/30ML; MG/30ML
30 SUSPENSION ORAL EVERY 6 HOURS PRN
Status: DISCONTINUED | OUTPATIENT
Start: 2023-12-10 | End: 2023-12-13 | Stop reason: HOSPADM

## 2023-12-10 RX ORDER — 0.9 % SODIUM CHLORIDE 0.9 %
1000 INTRAVENOUS SOLUTION INTRAVENOUS ONCE
Status: COMPLETED | OUTPATIENT
Start: 2023-12-10 | End: 2023-12-10

## 2023-12-10 RX ORDER — ISOSORBIDE MONONITRATE 30 MG/1
30 TABLET, EXTENDED RELEASE ORAL DAILY
Status: DISCONTINUED | OUTPATIENT
Start: 2023-12-10 | End: 2023-12-13 | Stop reason: HOSPADM

## 2023-12-10 RX ORDER — AMLODIPINE BESYLATE 5 MG/1
10 TABLET ORAL DAILY
Status: DISCONTINUED | OUTPATIENT
Start: 2023-12-11 | End: 2023-12-13 | Stop reason: HOSPADM

## 2023-12-10 RX ORDER — OXYCODONE HYDROCHLORIDE AND ACETAMINOPHEN 5; 325 MG/1; MG/1
1 TABLET ORAL ONCE
Status: COMPLETED | OUTPATIENT
Start: 2023-12-11 | End: 2023-12-11

## 2023-12-10 RX ORDER — ONDANSETRON 4 MG/1
4 TABLET, ORALLY DISINTEGRATING ORAL EVERY 8 HOURS PRN
Status: DISCONTINUED | OUTPATIENT
Start: 2023-12-10 | End: 2023-12-13 | Stop reason: HOSPADM

## 2023-12-10 RX ORDER — INSULIN LISPRO 100 [IU]/ML
0-4 INJECTION, SOLUTION INTRAVENOUS; SUBCUTANEOUS
Status: DISCONTINUED | OUTPATIENT
Start: 2023-12-10 | End: 2023-12-11

## 2023-12-10 RX ORDER — TRAZODONE HYDROCHLORIDE 50 MG/1
200 TABLET ORAL NIGHTLY PRN
Status: DISCONTINUED | OUTPATIENT
Start: 2023-12-10 | End: 2023-12-13 | Stop reason: HOSPADM

## 2023-12-10 RX ORDER — ACETAMINOPHEN 650 MG/1
650 SUPPOSITORY RECTAL EVERY 6 HOURS PRN
Status: DISCONTINUED | OUTPATIENT
Start: 2023-12-10 | End: 2023-12-13 | Stop reason: HOSPADM

## 2023-12-10 RX ORDER — ONDANSETRON 2 MG/ML
4 INJECTION INTRAMUSCULAR; INTRAVENOUS EVERY 6 HOURS PRN
Status: DISCONTINUED | OUTPATIENT
Start: 2023-12-10 | End: 2023-12-13 | Stop reason: HOSPADM

## 2023-12-10 RX ORDER — INSULIN LISPRO 100 [IU]/ML
0-4 INJECTION, SOLUTION INTRAVENOUS; SUBCUTANEOUS NIGHTLY
Status: DISCONTINUED | OUTPATIENT
Start: 2023-12-10 | End: 2023-12-11

## 2023-12-10 RX ORDER — LACTOBACILLUS RHAMNOSUS GG 10B CELL
1 CAPSULE ORAL
Status: DISCONTINUED | OUTPATIENT
Start: 2023-12-11 | End: 2023-12-13 | Stop reason: HOSPADM

## 2023-12-10 RX ORDER — ATORVASTATIN CALCIUM 40 MG/1
40 TABLET, FILM COATED ORAL NIGHTLY
Status: DISCONTINUED | OUTPATIENT
Start: 2023-12-10 | End: 2023-12-13 | Stop reason: HOSPADM

## 2023-12-10 RX ORDER — SODIUM CHLORIDE 0.9 % (FLUSH) 0.9 %
5-40 SYRINGE (ML) INJECTION PRN
Status: DISCONTINUED | OUTPATIENT
Start: 2023-12-10 | End: 2023-12-13 | Stop reason: HOSPADM

## 2023-12-10 RX ADMIN — Medication 10 ML: at 19:40

## 2023-12-10 RX ADMIN — ATORVASTATIN CALCIUM 40 MG: 40 TABLET, FILM COATED ORAL at 20:24

## 2023-12-10 RX ADMIN — PANTOPRAZOLE SODIUM 40 MG: 40 TABLET, DELAYED RELEASE ORAL at 20:24

## 2023-12-10 RX ADMIN — GABAPENTIN 200 MG: 100 CAPSULE ORAL at 22:40

## 2023-12-10 RX ADMIN — INSULIN LISPRO 4 UNITS: 100 INJECTION, SOLUTION INTRAVENOUS; SUBCUTANEOUS at 18:53

## 2023-12-10 RX ADMIN — CEFEPIME 2000 MG: 2 INJECTION, POWDER, FOR SOLUTION INTRAVENOUS at 15:10

## 2023-12-10 RX ADMIN — VANCOMYCIN HYDROCHLORIDE 1250 MG: 10 INJECTION, POWDER, LYOPHILIZED, FOR SOLUTION INTRAVENOUS at 15:46

## 2023-12-10 RX ADMIN — INSULIN LISPRO 4 UNITS: 100 INJECTION, SOLUTION INTRAVENOUS; SUBCUTANEOUS at 20:25

## 2023-12-10 RX ADMIN — CALCIUM CARBONATE-CHOLECALCIFEROL TAB 250 MG-125 UNIT 1 TABLET: 250-125 TAB at 20:24

## 2023-12-10 RX ADMIN — SODIUM CHLORIDE 1000 ML: 9 INJECTION, SOLUTION INTRAVENOUS at 15:02

## 2023-12-10 RX ADMIN — SODIUM CHLORIDE: 9 INJECTION, SOLUTION INTRAVENOUS at 18:40

## 2023-12-10 RX ADMIN — ISOSORBIDE MONONITRATE 30 MG: 30 TABLET, EXTENDED RELEASE ORAL at 20:24

## 2023-12-10 RX ADMIN — TRAZODONE HYDROCHLORIDE 200 MG: 50 TABLET ORAL at 22:40

## 2023-12-10 RX ADMIN — Medication 10 ML: at 18:39

## 2023-12-10 RX ADMIN — TICAGRELOR 90 MG: 90 TABLET ORAL at 20:24

## 2023-12-10 RX ADMIN — ASPIRIN 81 MG: 81 TABLET, COATED ORAL at 20:24

## 2023-12-10 RX ADMIN — HEPARIN SODIUM 5000 UNITS: 5000 INJECTION INTRAVENOUS; SUBCUTANEOUS at 20:26

## 2023-12-10 ASSESSMENT — PAIN SCALES - GENERAL: PAINLEVEL_OUTOF10: 9

## 2023-12-10 ASSESSMENT — PAIN DESCRIPTION - LOCATION: LOCATION: BACK

## 2023-12-10 ASSESSMENT — PAIN DESCRIPTION - PAIN TYPE: TYPE: CHRONIC PAIN

## 2023-12-10 ASSESSMENT — PAIN - FUNCTIONAL ASSESSMENT: PAIN_FUNCTIONAL_ASSESSMENT: NONE - DENIES PAIN

## 2023-12-10 NOTE — ED NOTES
Ms. Skyler Kemp is a 58 y.o. female who had concerns including Hyperglycemia (Family states that pt was not returning phone calls or answering the door so the fire department checked on her. Family states that she typically has low blood sugar but this morning was in the 400s. Family states that speech seems more slurred. ) and Aphasia. Chief Complaint   Patient presents with    Hyperglycemia     Family states that pt was not returning phone calls or answering the door so the fire department checked on her. Family states that she typically has low blood sugar but this morning was in the 400s. Family states that speech seems more slurred. Aphasia       She is being admitted for:    Confusion    Her ED problem list included:    1. Acute encephalopathy    2. Pneumonia due to infectious organism, unspecified laterality, unspecified part of lung    3. Failure to thrive in adult        Past Medical History:   Diagnosis Date    Abnormal brain MRI 7/20/2017    Partially empty sella and minimal chronic small vessel ischemic disease    Acute bilateral low back pain without sciatica 11/2/2016    SHARRON (acute kidney injury) ([address]) 7/5/2017    Arthritis     back    Bipolar disorder ([address]) 10/18/2008    CAD (coronary artery disease)     stent placed 6/8/20    Cancer ([address]) 2015    bilateral breast:s/p lumpectomy/radiation:under care care of breast specialist:Dr. Boone     Carotid stenosis, bilateral:<50%:per US 7/2016 7/15/2016    Carpal tunnel syndrome 10/18/2008    Cervical cancer screening 2014    Nml per pt'.     Coronary artery disease of native artery of native heart with stable angina pectoris ([address]) 6/9/2020    DDD (degenerative disc disease), lumbar 7/18/2018    Depression     under care of pschiatrist:Dr. Brunilda Morrison    Depression/anxiety 7/5/2017    Depression/anxiety     Diabetes mellitus ([address])     Gout     History of mammogram 10/28/2016;8/14/17    Negative    History of therapeutic radiation     Hyperlipidemia     Hypertension

## 2023-12-10 NOTE — H&P
infiltrate noted in the the left lower lobe medially. This could represent pneumonia and requires clinical correlation.          Electronically signed by Jessika Yusuf MD on 12/10/2023 at 3:44 PM

## 2023-12-10 NOTE — ED NOTES
Pt able to stand with assistance to the bedside commode. Urine sent per order. Will continue to monitor.       Lorena Fried RN  12/10/23 1808

## 2023-12-11 ENCOUNTER — TELEPHONE (OUTPATIENT)
Dept: ENDOCRINOLOGY | Age: 63
End: 2023-12-11

## 2023-12-11 LAB
ALBUMIN SERPL-MCNC: 2.8 G/DL (ref 3.4–5)
ALBUMIN/GLOB SERPL: 0.8 {RATIO} (ref 1.1–2.2)
ALP SERPL-CCNC: 208 U/L (ref 40–129)
ALT SERPL-CCNC: 26 U/L (ref 10–40)
ANION GAP SERPL CALCULATED.3IONS-SCNC: 7 MMOL/L (ref 3–16)
AST SERPL-CCNC: 18 U/L (ref 15–37)
BASOPHILS # BLD: 0 K/UL (ref 0–0.2)
BASOPHILS NFR BLD: 0.4 %
BILIRUB SERPL-MCNC: 0.3 MG/DL (ref 0–1)
BUN SERPL-MCNC: 24 MG/DL (ref 7–20)
CALCIUM SERPL-MCNC: 8.3 MG/DL (ref 8.3–10.6)
CHLORIDE SERPL-SCNC: 108 MMOL/L (ref 99–110)
CO2 SERPL-SCNC: 23 MMOL/L (ref 21–32)
CREAT SERPL-MCNC: 1.5 MG/DL (ref 0.6–1.2)
DEPRECATED RDW RBC AUTO: 15 % (ref 12.4–15.4)
EOSINOPHIL # BLD: 0.1 K/UL (ref 0–0.6)
EOSINOPHIL NFR BLD: 1.5 %
GFR SERPLBLD CREATININE-BSD FMLA CKD-EPI: 38 ML/MIN/{1.73_M2}
GLUCOSE BLD-MCNC: 103 MG/DL (ref 70–99)
GLUCOSE BLD-MCNC: 258 MG/DL (ref 70–99)
GLUCOSE BLD-MCNC: 360 MG/DL (ref 70–99)
GLUCOSE BLD-MCNC: 364 MG/DL (ref 70–99)
GLUCOSE SERPL-MCNC: 202 MG/DL (ref 70–99)
HCT VFR BLD AUTO: 31.6 % (ref 36–48)
HGB BLD-MCNC: 9.8 G/DL (ref 12–16)
LYMPHOCYTES # BLD: 1.4 K/UL (ref 1–5.1)
LYMPHOCYTES NFR BLD: 32.8 %
MCH RBC QN AUTO: 27.3 PG (ref 26–34)
MCHC RBC AUTO-ENTMCNC: 30.9 G/DL (ref 31–36)
MCV RBC AUTO: 88.5 FL (ref 80–100)
MONOCYTES # BLD: 0.2 K/UL (ref 0–1.3)
MONOCYTES NFR BLD: 5.5 %
NEUTROPHILS # BLD: 2.6 K/UL (ref 1.7–7.7)
NEUTROPHILS NFR BLD: 59.8 %
PERFORMED ON: ABNORMAL
PLATELET # BLD AUTO: 114 K/UL (ref 135–450)
PMV BLD AUTO: 10.1 FL (ref 5–10.5)
POTASSIUM SERPL-SCNC: 4.5 MMOL/L (ref 3.5–5.1)
PROT SERPL-MCNC: 6.3 G/DL (ref 6.4–8.2)
RBC # BLD AUTO: 3.57 M/UL (ref 4–5.2)
SODIUM SERPL-SCNC: 138 MMOL/L (ref 136–145)
WBC # BLD AUTO: 4.3 K/UL (ref 4–11)

## 2023-12-11 PROCEDURE — 36415 COLL VENOUS BLD VENIPUNCTURE: CPT

## 2023-12-11 PROCEDURE — 97535 SELF CARE MNGMENT TRAINING: CPT

## 2023-12-11 PROCEDURE — 97530 THERAPEUTIC ACTIVITIES: CPT

## 2023-12-11 PROCEDURE — 6360000002 HC RX W HCPCS: Performed by: INTERNAL MEDICINE

## 2023-12-11 PROCEDURE — 1200000000 HC SEMI PRIVATE

## 2023-12-11 PROCEDURE — 85025 COMPLETE CBC W/AUTO DIFF WBC: CPT

## 2023-12-11 PROCEDURE — 80053 COMPREHEN METABOLIC PANEL: CPT

## 2023-12-11 PROCEDURE — 97162 PT EVAL MOD COMPLEX 30 MIN: CPT

## 2023-12-11 PROCEDURE — 83036 HEMOGLOBIN GLYCOSYLATED A1C: CPT

## 2023-12-11 PROCEDURE — 6370000000 HC RX 637 (ALT 250 FOR IP): Performed by: INTERNAL MEDICINE

## 2023-12-11 PROCEDURE — 99255 IP/OBS CONSLTJ NEW/EST HI 80: CPT | Performed by: PSYCHIATRY & NEUROLOGY

## 2023-12-11 PROCEDURE — 2580000003 HC RX 258: Performed by: INTERNAL MEDICINE

## 2023-12-11 PROCEDURE — 97166 OT EVAL MOD COMPLEX 45 MIN: CPT

## 2023-12-11 PROCEDURE — 6370000000 HC RX 637 (ALT 250 FOR IP): Performed by: STUDENT IN AN ORGANIZED HEALTH CARE EDUCATION/TRAINING PROGRAM

## 2023-12-11 PROCEDURE — 6370000000 HC RX 637 (ALT 250 FOR IP): Performed by: NURSE PRACTITIONER

## 2023-12-11 PROCEDURE — 97116 GAIT TRAINING THERAPY: CPT

## 2023-12-11 PROCEDURE — 6370000000 HC RX 637 (ALT 250 FOR IP): Performed by: PSYCHIATRY & NEUROLOGY

## 2023-12-11 RX ORDER — INSULIN LISPRO 100 [IU]/ML
0-16 INJECTION, SOLUTION INTRAVENOUS; SUBCUTANEOUS
Status: DISCONTINUED | OUTPATIENT
Start: 2023-12-11 | End: 2023-12-13 | Stop reason: HOSPADM

## 2023-12-11 RX ORDER — INSULIN LISPRO 100 [IU]/ML
0-4 INJECTION, SOLUTION INTRAVENOUS; SUBCUTANEOUS NIGHTLY
Status: DISCONTINUED | OUTPATIENT
Start: 2023-12-11 | End: 2023-12-13 | Stop reason: HOSPADM

## 2023-12-11 RX ORDER — PALIPERIDONE 3 MG/1
9 TABLET, EXTENDED RELEASE ORAL DAILY
Status: DISCONTINUED | OUTPATIENT
Start: 2023-12-11 | End: 2023-12-13 | Stop reason: HOSPADM

## 2023-12-11 RX ADMIN — HEPARIN SODIUM 5000 UNITS: 5000 INJECTION INTRAVENOUS; SUBCUTANEOUS at 08:58

## 2023-12-11 RX ADMIN — PANTOPRAZOLE SODIUM 40 MG: 40 TABLET, DELAYED RELEASE ORAL at 08:59

## 2023-12-11 RX ADMIN — GABAPENTIN 200 MG: 100 CAPSULE ORAL at 12:58

## 2023-12-11 RX ADMIN — TICAGRELOR 90 MG: 90 TABLET ORAL at 08:59

## 2023-12-11 RX ADMIN — ACETAMINOPHEN 650 MG: 325 TABLET ORAL at 13:02

## 2023-12-11 RX ADMIN — ASPIRIN 81 MG: 81 TABLET, COATED ORAL at 08:59

## 2023-12-11 RX ADMIN — AMLODIPINE BESYLATE 10 MG: 5 TABLET ORAL at 08:59

## 2023-12-11 RX ADMIN — ATORVASTATIN CALCIUM 40 MG: 40 TABLET, FILM COATED ORAL at 20:19

## 2023-12-11 RX ADMIN — ISOSORBIDE MONONITRATE 30 MG: 30 TABLET, EXTENDED RELEASE ORAL at 08:59

## 2023-12-11 RX ADMIN — INSULIN LISPRO 4 UNITS: 100 INJECTION, SOLUTION INTRAVENOUS; SUBCUTANEOUS at 21:22

## 2023-12-11 RX ADMIN — GABAPENTIN 200 MG: 100 CAPSULE ORAL at 20:19

## 2023-12-11 RX ADMIN — HEPARIN SODIUM 5000 UNITS: 5000 INJECTION INTRAVENOUS; SUBCUTANEOUS at 20:19

## 2023-12-11 RX ADMIN — Medication 10 ML: at 19:29

## 2023-12-11 RX ADMIN — Medication 1 CAPSULE: at 08:59

## 2023-12-11 RX ADMIN — CEFEPIME 2000 MG: 2 INJECTION, POWDER, FOR SOLUTION INTRAVENOUS at 03:06

## 2023-12-11 RX ADMIN — Medication 10 ML: at 08:59

## 2023-12-11 RX ADMIN — PALIPERIDONE 9 MG: 3 TABLET, EXTENDED RELEASE ORAL at 12:48

## 2023-12-11 RX ADMIN — OXYCODONE AND ACETAMINOPHEN 1 TABLET: 5; 325 TABLET ORAL at 00:08

## 2023-12-11 RX ADMIN — ACETAMINOPHEN 650 MG: 325 TABLET ORAL at 20:22

## 2023-12-11 RX ADMIN — INSULIN LISPRO 2 UNITS: 100 INJECTION, SOLUTION INTRAVENOUS; SUBCUTANEOUS at 09:00

## 2023-12-11 RX ADMIN — CALCIUM CARBONATE-CHOLECALCIFEROL TAB 250 MG-125 UNIT 1 TABLET: 250-125 TAB at 08:59

## 2023-12-11 RX ADMIN — INSULIN LISPRO 16 UNITS: 100 INJECTION, SOLUTION INTRAVENOUS; SUBCUTANEOUS at 12:54

## 2023-12-11 RX ADMIN — GABAPENTIN 200 MG: 100 CAPSULE ORAL at 08:59

## 2023-12-11 RX ADMIN — TICAGRELOR 90 MG: 90 TABLET ORAL at 20:19

## 2023-12-11 ASSESSMENT — PAIN DESCRIPTION - ORIENTATION
ORIENTATION: LOWER

## 2023-12-11 ASSESSMENT — PAIN DESCRIPTION - LOCATION
LOCATION: BACK

## 2023-12-11 ASSESSMENT — PAIN SCALES - GENERAL
PAINLEVEL_OUTOF10: 5
PAINLEVEL_OUTOF10: 0
PAINLEVEL_OUTOF10: 8
PAINLEVEL_OUTOF10: 8
PAINLEVEL_OUTOF10: 5
PAINLEVEL_OUTOF10: 9
PAINLEVEL_OUTOF10: 0
PAINLEVEL_OUTOF10: 3
PAINLEVEL_OUTOF10: 8
PAINLEVEL_OUTOF10: 0

## 2023-12-11 ASSESSMENT — PAIN DESCRIPTION - DESCRIPTORS
DESCRIPTORS: ACHING

## 2023-12-11 ASSESSMENT — PAIN DESCRIPTION - PAIN TYPE
TYPE: ACUTE PAIN

## 2023-12-11 NOTE — CONSULTS
Consult Call Back    Who: Dr. Rasheed Males  Date:12/11/2023,  Time:12:56 PM    Electronically signed by Gerson Cook on 12/11/23 at 12:56 PM EST

## 2023-12-11 NOTE — CONSULTS
Pre-Operative H & P     CC:  Preoperative exam to assess for increased cardiopulmonary risk while undergoing surgery and anesthesia.    Date of Encounter: 7/24/2023  Primary Care Physician:  Selena Bella     Reason for visit:   Encounter Diagnoses   Name Primary?     Preop examination Yes     Gastroesophageal reflux disease without esophagitis      Screen for colon cancer      Hiatal hernia        HPI  Gudelia Dong is a 59 year old female who presents for pre-operative H & P in preparation for  Procedure Information     Case: [id number] Date/Time: 08/08/23 0830    Procedures:       Esophagoscopy, gastroscopy, duodenoscopy (EGD), combined (Esophagus) - Screen for colon cancer [Z12.11]  Gastroesophageal reflux disease without esophagitis [K21.9]  Hiatal hernia [K44.9]  Class 3 severe obesity with body mass index (BMI) of 40.0 to 44.9 in adult, unsp...      Colonoscopy (Anus) - Screen for colon cancer [Z12.11]  Gastroesophageal reflux disease without esophagitis [K21.9]  Hiatal hernia [K44.9]  Class 3 severe obesity with body mass index (BMI) of 40.0 to 44.9 in adult, unsp...    Anesthesia type: General    Diagnosis:       Screen for colon cancer [Z12.11]      Gastroesophageal reflux disease without esophagitis [K21.9]      Hiatal hernia [K44.9]      Class 3 severe obesity with body mass index (BMI) of 40.0 to 44.9 in adult, unspecified obesity type, unspecified whether serious comorbidity present (H) [E66.01, Z68.41]    Pre-op diagnosis:       Screen for colon cancer [Z12.11]      Gastroesophageal reflux disease without esophagitis [K21.9]      Hiatal hernia [K44.9]      Class 3 severe obesity with body mass index (BMI) of 40.0 to 44.9 in adult, unspecified obesity type, unspecified whether serious comorbidity present (H) [E66.01, Z68.41]    Location:  [facility] /  GI    Providers: René Rodriguez DO          The patient was recently seen by PADMINI Villegas in new patient Gastroenterology consultation for evaluation  Full note dictated. Dx;  bipolar disorder    Rec:  I will restart her invega 9 mgs daily. Her mood seems stable enough, probably impacted by poor compliance with health care. No need for inpatient psychiatry and can be discharged when determined to be medically stable.       Jacinto Neely MD of CRC screening, GERD and hiatal hernia with a chief complaint of heartburn/regurgiation/dysphagia and belching.  The above procedures have been scheduled for further evaluation of patient's symptoms.     The patient presents to the PAC virtually in preparation for the above procedure with comorbid conditions including ROMEO on CPAP, GERD, morbid obesity, prediabetes, hypothyroidism, h/o hyponatrima, iron deficiency anemia, MRSA, hidradentis suppurativa, bipolar depression, ADHD, insomnia, h/o psychosis, nicotine dependence, and h/o alcohol use disorder.     History is obtained from the patient and chart review    Hx of abnormal bleeding or anti-platelet use: denies    Menstrual history: Patient's last menstrual period was 07/20/2006.:      Past Medical History  Past Medical History:   Diagnosis Date     Depression      GERD (gastroesophageal reflux disease)      Hemorrhoid      Menstrual disorder     s/p D&C -12/2012     ROMEO on CPAP      Other bipolar disorders     followed by Dr Collazo     Post menopausal syndrome      Tobacco abuse        Past Surgical History  Past Surgical History:   Procedure Laterality Date     COLONOSCOPY  12/24/2013    Procedure: COLONOSCOPY;;  Surgeon: Guy Grant MD;  Location:  GI     COLONOSCOPY  1/9/2014    Procedure: COMBINED COLONOSCOPY, SINGLE BIOPSY/POLYPECTOMY BY BIOPSY;;  Surgeon: Guy Grant MD;  Location:  GI     ESOPHAGOSCOPY, GASTROSCOPY, DUODENOSCOPY (EGD), COMBINED  12/24/2013    Procedure: COMBINED ESOPHAGOSCOPY, GASTROSCOPY, DUODENOSCOPY (EGD), BIOPSY SINGLE OR MULTIPLE;  ESOPHAGOSCOPY, GASTROSCOPY, DUODENOSCOPY, COLONOSCOPY;  Surgeon: Guy Grant MD;  Location:  GI     GENITOURINARY SURGERY      urethra stretched     GYN SURGERY      d&c      LAPAROSCOPIC ASSISTED RECTOPEXY  3/14/2014    Procedure: LAPAROSCOPIC ASSISTED RECTOPEXY;  LAPAROSCOPIC  VENTRAL RECTOPEXY ;  Surgeon: Jennifer Connolly MD;  Location:  OR     ORTHOPEDIC SURGERY      surgery on  clavicle,surgery for left leg fracture       Prior to Admission Medications  Current Outpatient Medications   Medication Sig Dispense Refill     ammonium lactate (AMLACTIN) 12 % external cream Apply topically daily as needed for dry skin 385 g 1     bacitracin 500 UNIT/GM external ointment APPLY EXTERNALLY TO THE AFFECTED AREA TWICE DAILY FOR 7 DAYS 28.4 g 1     bacitracin 500 UNIT/GM OINT Apply 353 g topically 2 times daily       buPROPion (WELLBUTRIN XL) 150 MG 24 hr tablet 300 mg every morning       busPIRone HCl (BUSPAR) 30 MG tablet Take 1 tablet by mouth 2 times daily       clotrimazole (LOTRIMIN) 1 % external cream Apply topically 2 times daily 60 g 11     doxepin (SINEQUAN) 100 MG capsule Take 100 mg by mouth At Bedtime       econazole nitrate 1 % external cream Apply topically daily To feet and toenails. 85 g 5     escitalopram (LEXAPRO) 10 MG tablet Take 10 mg by mouth every morning       estradiol (ESTRACE) 0.1 MG/GM vaginal cream Place 2 g vaginally twice a week 42.5 g 1     gabapentin (NEURONTIN) 300 MG capsule Take 300 mg by mouth At Bedtime (per psychiatrist)       ketoconazole (NIZORAL) 2 % external cream Apply topically 2 times daily 60 g 0     lamoTRIgine (LAMICTAL) 100 MG tablet Take 100 mg by mouth every morning       levothyroxine (SYNTHROID/LEVOTHROID) 150 MCG tablet Take 1 tablet (150 mcg) by mouth daily (Patient taking differently: Take 150 mcg by mouth every morning) 90 tablet 2     nicotine (NICODERM CQ) 21 MG/24HR 24 hr patch Place 1 patch onto the skin every 24 hours 30 patch 1     nystatin (MYCOSTATIN) 961219 UNIT/GM external powder Apply topically daily 60 g 11     pantoprazole (PROTONIX) 40 MG EC tablet Take 1 tablet (40 mg) by mouth 2 times daily 180 tablet 2     REXULTI 2 MG tablet Take 2 mg by mouth every morning       triamcinolone (KENALOG) 0.1 % external cream Apply topically 2 times daily Only for 2 weeks. 30 g 0     busPIRone (BUSPAR) 10 MG tablet Take 10 mg by mouth every  morning       Doxepin HCl 150 MG CAPS Take 300 mg by mouth At Bedtime (Patient taking differently: Take 300 mg by mouth At Bedtime) 30 capsule 0     order for DME Equipment being ordered: CPAP  Please dispense Cpap and its supply 1 Units prn       Allergies  Allergies   Allergen Reactions     Contrast Dye Shortness Of Breath     Methylpyrrolidone Swelling     Iodine Swelling     Other reaction(s): Angioedema  Facial swelling.     Morphine Anxiety and Nausea and Vomiting     Other reaction(s): Behavioral Disturbances  Other reaction(s): Anxiety, vomiting       Social History  Social History     Socioeconomic History     Marital status:      Spouse name: Not on file     Number of children: Not on file     Years of education: Not on file     Highest education level: Not on file   Occupational History     Not on file   Tobacco Use     Smoking status: Some Days     Packs/day: 0.25     Types: Cigarettes     Passive exposure: Current     Smokeless tobacco: Never     Tobacco comments:     quite for year, restarted 2022.       2023: Now smokes a couple cigarettes per day - trying to quit.    Substance and Sexual Activity     Alcohol use: No     Comment: Severe alcohol use disorder for 20 years.  2023:  Reports sober for past five years     Drug use: No     Sexual activity: Yes     Partners: Male   Other Topics Concern     Parent/sibling w/ CABG, MI or angioplasty before 65F 55M? Not Asked   Social History Narrative    8/2013    /single/    Children-    Work-    Tobacco-    ETOH-    Exercise-         Social Determinants of Health     Financial Resource Strain: Not on file   Food Insecurity: Not on file   Transportation Needs: Not on file   Physical Activity: Not on file   Stress: Not on file   Social Connections: Not on file   Intimate Partner Violence: Not on file   Housing Stability: Not on file       Family History  Family History   Problem Relation Age of Onset     Cancer Mother         ovarian cancer      Hypertension Father      Breast Cancer No family hx of      Cancer - colorectal No family hx of        Review of Systems  The complete review of systems is negative other than noted in the HPI or here.   Anesthesia Evaluation   Pt has had prior anesthetic. Type: General and MAC.    History of anesthetic complications (Difficult to stay asleep. )   ROMEO.    ROS/MED HX  ENT/Pulmonary:     (+) sleep apnea, uses CPAP, tobacco use (Trying to quit smoking.  ), Current use,     Neurologic:  - neg neurologic ROS     Cardiovascular: Comment: Denies cardiac symptoms including chest pain, SOB, palpitations, syncope, SANCHEZ, orthopnea, or PND.      (+) -----Previous cardiac testing  (-) taking anticoagulants/antiplatelets   METS/Exercise Tolerance: >4 METS Comment: Able to walk 1/2 mile.    Lives in an apartment and does all errands either by train, bus or walking.    Hematologic:     (+) anemia,     Musculoskeletal: Comment: S/p left LE surgery with hardware.       GI/Hepatic: Comment: Dysphagia and belching    (+) GERD, Asymptomatic on medication, hiatal hernia,     Renal/Genitourinary:  - neg Renal ROS     Endo: Comment: Prediabetes     (+) thyroid problem, hypothyroidism, Obesity,     Psychiatric/Substance Use:     (+) psychiatric history depression, bipolar and other (comment) (ADHD.  Reports being stable. Follows with Psychiatrist. ) alcohol abuse (Alcohol use disorder - sober for 5 years. )  (-) chronic opioid use history   Infectious Disease:     (+) MRSA,     Malignancy:  - neg malignancy ROS     Other:  - neg other ROS          Virtual visit -  No vitals were obtained    Physical Exam  Constitutional: Awake, alert, cooperative, no apparent distress, and appears stated age.  Eyes: Pupils equal  HENT: Normocephalic  Respiratory: non labored breathing   Neurologic: Awake, alert, oriented to name, place and time.   Neuropsychiatric: Calm, cooperative. Normal affect.      Prior Labs/Diagnostic Studies   All labs and  imaging personally reviewed    Latest Reference Range & Units 03/28/23 09:11   Sodium 136 - 145 mmol/L 145   Potassium 3.4 - 5.3 mmol/L 4.1   Chloride 98 - 107 mmol/L 105   Carbon Dioxide (CO2) 22 - 29 mmol/L 26   Urea Nitrogen 8.0 - 23.0 mg/dL 15.4   Creatinine 0.51 - 0.95 mg/dL 0.83   GFR Estimate >60 mL/min/1.73m2 80   Calcium 8.8 - 10.2 mg/dL 9.3   Anion Gap 7 - 15 mmol/L 14   Albumin 3.5 - 5.2 g/dL 4.3   Protein Total 6.4 - 8.3 g/dL 7.6   Alkaline Phosphatase 35 - 104 U/L 123 (H)   ALT 10 - 35 U/L 21   AST 10 - 35 U/L 21   Bilirubin Total <=1.2 mg/dL 0.2   Cholesterol <200 mg/dL 174   Glucose 70 - 99 mg/dL 94   HDL Cholesterol >=50 mg/dL 69   Hemoglobin A1C 0.0 - 5.6 % 5.8 (H)   LDL Cholesterol Calculated <=100 mg/dL 89   Non HDL Cholesterol <130 mg/dL 105   Triglycerides <150 mg/dL 82   TSH 0.30 - 4.20 uIU/mL 0.85   WBC 4.0 - 11.0 10e3/uL 5.1   Hemoglobin 11.7 - 15.7 g/dL 11.7   Hematocrit 35.0 - 47.0 % 37.5   Platelet Count 150 - 450 10e3/uL 298   RBC Count 3.80 - 5.20 10e6/uL 4.23   MCV 78 - 100 fL 89   MCH 26.5 - 33.0 pg 27.7   MCHC 31.5 - 36.5 g/dL 31.2 (L)   RDW 10.0 - 15.0 % 15.7 (H)   % Neutrophils % 50   % Lymphocytes % 36   % Monocytes % 8   % Eosinophils % 5   % Basophils % 1   Absolute Basophils 0.0 - 0.2 10e3/uL 0.0   Absolute Eosinophils 0.0 - 0.7 10e3/uL 0.3   Absolute Immature Granulocytes <=0.4 10e3/uL 0.0   Absolute Lymphocytes 0.8 - 5.3 10e3/uL 1.8   Absolute Monocytes 0.0 - 1.3 10e3/uL 0.4   % Immature Granulocytes % 0   Absolute Neutrophils 1.6 - 8.3 10e3/uL 2.6   (H): Data is abnormally high  (L): Data is abnormally low    PROCEDURES     Echocardiogram 2015  Narrative & Impression     Interpretation Summary  Global and regional left ventricular function is normal with an EF of 60-65%.   Right ventricular function, chamber size, wall motion, and thickness are   normal. Pulmonary artery systolic pressure cannot be assessed. The inferior   vena cava  is normal. No pericardial effusion is  present.  PatientHeight: 68 in  PatientWeight: 208 lbs  BSA 2.1 m^2    EKG 2015  NSR      The patient's records and results personally reviewed by this provider.     Outside records reviewed from: Care Everywhere      Assessment  Gudelia Dong is a 59 year old female seen as a PAC referral for risk assessment and optimization for anesthesia.    Plan/Recommendations  Pt will be optimized for the proposed procedure.  See below for details on the assessment, risk, and preoperative recommendations    NEUROLOGY  - No history of TIA, CVA or seizure    -Post Op delirium risk factors:  No risk identified    ENT  - Reports recent chipped right back upper molar   Mallampati: Unable to assess  TM: Unable to assess    CARDIAC  - Denies known coronary artery disease.  Denies cardiac symptoms.    - METS (Metabolic Equivalents)  Patient performs 4 or more METS exercise without symptoms            Total Score: 0       - RCRI-Very low risk: Class 1 0.4% complication rate            Total Score: 0        PULMONARY  - Obstructive Sleep Apnea  ROMEO with home CPAP.  Patient will be instructed to bring their home CPAP device to the hospital with them.  ROMEO Medium Risk            Total Score: 3    ROMEO: BMI over 35 kg/m2    ROMEO: Over 50 ys old    ROMEO: Neck Circum >16 in      - Denies asthma or inhaler use     - Tobacco History   ~ Attempting to quit.  Down to ~ 2 cigarettes per day.   ~ Encouraged on going smoking cessation.      History   Smoking Status     Some Days     Packs/day: 0.25     Types: Cigarettes   Smokeless Tobacco     Never       GI  - GERD, dysphagia and belching with known hiatal hernia   ~ taking Protonix 40 mg twice day and  Not controlled on medications    ~ above procedure scheduled    PONV Low Risk  Total Score: 1           1 AN PONV: Pt is Female        /RENAL  - Baseline Creatinine  See above     ENDOCRINE    - BMI: Estimated body mass index is 44.51 kg/m  as calculated from the following:    Height as of 5/19/23:  "1.715 m (5' 7.52\").    Weight as of 5/19/23: 130.9 kg (288 lb 9.6 oz).  Class 3 Obesity (BMI > 40)    - Elevated A1C 5.8    HEME  - VTE Low Risk 0.26%            Total Score: 1    VTE: BMI greater than 39      - No history of abnormal bleeding or antiplatelet use.    - Iron deficient anemia   ~ hgb 11.7    PSYCH  - Bipolar depression and ADHD   ~ reports doing well on current medications and followed by psychiatry.  Continue mental health medications as prescribed DOS    - h/o Alcohol use disorder for >20 years with many admission for detoxification.  Now in remission for past five years. Congratulated her on her sobriety.     - Record indicates a h/o psychosis but patient does not recall this.    ID  - MRSA (+)   ~ contact precautions    Different anesthesia methods/types have been discussed with the patient, but they are aware that the final plan will be decided by the assigned anesthesia provider on the date of service.      The patient is optimized for their procedure. Information on surgery time/arrival time, preparation for procedure and NPO status will be provided by  GI nursing staff. PAC nursing staff to provide medication instructions. No further diagnostic testing indicated.    Please refer to the physical examination documented by the anesthesiologist in the anesthesia record on the day of surgery.    Video-Visit Details    Type of service:  Video Visit    Provider received verbal consent for a Video Visit from the patient? Yes     Originating Location (pt. Location): Home  Distant Location (provider location):  Off-site  Mode of Communication:  Video Conference via [employer]  On the day of service:     Prep time: 8 minutes  Visit time: 19 minutes  Documentation time: 13 minutes  ------------------------------------------  Total time: 40 minutes      INEZ Kaur CNP  Preoperative Assessment Center  [facility]  Phone: [phone]  Fax: [phone]  "

## 2023-12-11 NOTE — ACP (ADVANCE CARE PLANNING)
Advance Care Planning     General Advance Care Planning (ACP) Conversation    Date of Conversation: 12/10/2023  Conducted with: Patient with Decision Making Capacity    Healthcare Decision Maker:    Primary Decision Maker: Latoya Kellogg - [phone]    Secondary Decision Maker: CorkyMoi - Brother/Sister - [phone]    Secondary Decision Maker: Justen Shi - Daughter-in-Law - [phone]    Supplemental (Other) Decision Maker: Valdo Luke - Other - [phone]  Click here to complete Healthcare Decision Makers including selection of the Healthcare Decision Maker Relationship (ie \"Primary\"). Today we documented Decision Maker(s) consistent with ACP documents on file. Content/Action Overview:   Has ACP document(s) on file - reflects the patient's care preferences  Reviewed DNR/DNI and patient elects Full Code (Attempt Resuscitation)        Length of Voluntary ACP Conversation in minutes:  <16 minutes (Non-Billable)    Osiel Erickson RN

## 2023-12-12 LAB
ALBUMIN SERPL-MCNC: 3.4 G/DL (ref 3.4–5)
ALBUMIN/GLOB SERPL: 0.8 {RATIO} (ref 1.1–2.2)
ALP SERPL-CCNC: 266 U/L (ref 40–129)
ALT SERPL-CCNC: 30 U/L (ref 10–40)
ANION GAP SERPL CALCULATED.3IONS-SCNC: 10 MMOL/L (ref 3–16)
AST SERPL-CCNC: 21 U/L (ref 15–37)
BASOPHILS # BLD: 0 K/UL (ref 0–0.2)
BASOPHILS NFR BLD: 0.4 %
BILIRUB SERPL-MCNC: 0.4 MG/DL (ref 0–1)
BUN SERPL-MCNC: 21 MG/DL (ref 7–20)
CALCIUM SERPL-MCNC: 9.1 MG/DL (ref 8.3–10.6)
CHLORIDE SERPL-SCNC: 108 MMOL/L (ref 99–110)
CO2 SERPL-SCNC: 17 MMOL/L (ref 21–32)
CREAT SERPL-MCNC: 1.7 MG/DL (ref 0.6–1.2)
DEPRECATED RDW RBC AUTO: 15.2 % (ref 12.4–15.4)
EOSINOPHIL # BLD: 0.1 K/UL (ref 0–0.6)
EOSINOPHIL NFR BLD: 0.9 %
EST. AVERAGE GLUCOSE BLD GHB EST-MCNC: 205.9 MG/DL
GFR SERPLBLD CREATININE-BSD FMLA CKD-EPI: 33 ML/MIN/{1.73_M2}
GLUCOSE BLD-MCNC: 191 MG/DL (ref 70–99)
GLUCOSE BLD-MCNC: 254 MG/DL (ref 70–99)
GLUCOSE BLD-MCNC: 265 MG/DL (ref 70–99)
GLUCOSE BLD-MCNC: 292 MG/DL (ref 70–99)
GLUCOSE SERPL-MCNC: 300 MG/DL (ref 70–99)
HBA1C MFR BLD: 8.8 %
HCT VFR BLD AUTO: 31 % (ref 36–48)
HGB BLD-MCNC: 9.8 G/DL (ref 12–16)
LYMPHOCYTES # BLD: 1.5 K/UL (ref 1–5.1)
LYMPHOCYTES NFR BLD: 21.8 %
MCH RBC QN AUTO: 28.4 PG (ref 26–34)
MCHC RBC AUTO-ENTMCNC: 31.6 G/DL (ref 31–36)
MCV RBC AUTO: 89.8 FL (ref 80–100)
MONOCYTES # BLD: 0.3 K/UL (ref 0–1.3)
MONOCYTES NFR BLD: 4.3 %
NEUTROPHILS # BLD: 5.1 K/UL (ref 1.7–7.7)
NEUTROPHILS NFR BLD: 72.6 %
PERFORMED ON: ABNORMAL
PLATELET # BLD AUTO: 152 K/UL (ref 135–450)
PMV BLD AUTO: 9.9 FL (ref 5–10.5)
POTASSIUM SERPL-SCNC: 5.3 MMOL/L (ref 3.5–5.1)
PROT SERPL-MCNC: 7.8 G/DL (ref 6.4–8.2)
RBC # BLD AUTO: 3.45 M/UL (ref 4–5.2)
SODIUM SERPL-SCNC: 135 MMOL/L (ref 136–145)
WBC # BLD AUTO: 7 K/UL (ref 4–11)

## 2023-12-12 PROCEDURE — 85025 COMPLETE CBC W/AUTO DIFF WBC: CPT

## 2023-12-12 PROCEDURE — 6370000000 HC RX 637 (ALT 250 FOR IP): Performed by: INTERNAL MEDICINE

## 2023-12-12 PROCEDURE — 80053 COMPREHEN METABOLIC PANEL: CPT

## 2023-12-12 PROCEDURE — 1200000000 HC SEMI PRIVATE

## 2023-12-12 PROCEDURE — 6370000000 HC RX 637 (ALT 250 FOR IP): Performed by: NURSE PRACTITIONER

## 2023-12-12 PROCEDURE — 2580000003 HC RX 258: Performed by: INTERNAL MEDICINE

## 2023-12-12 PROCEDURE — 6370000000 HC RX 637 (ALT 250 FOR IP): Performed by: STUDENT IN AN ORGANIZED HEALTH CARE EDUCATION/TRAINING PROGRAM

## 2023-12-12 PROCEDURE — 36415 COLL VENOUS BLD VENIPUNCTURE: CPT

## 2023-12-12 PROCEDURE — 6360000002 HC RX W HCPCS: Performed by: INTERNAL MEDICINE

## 2023-12-12 PROCEDURE — 6370000000 HC RX 637 (ALT 250 FOR IP): Performed by: PSYCHIATRY & NEUROLOGY

## 2023-12-12 RX ORDER — OXYCODONE HYDROCHLORIDE AND ACETAMINOPHEN 5; 325 MG/1; MG/1
1 TABLET ORAL EVERY 8 HOURS PRN
Status: DISCONTINUED | OUTPATIENT
Start: 2023-12-12 | End: 2023-12-13 | Stop reason: HOSPADM

## 2023-12-12 RX ORDER — OXYCODONE HYDROCHLORIDE AND ACETAMINOPHEN 5; 325 MG/1; MG/1
1 TABLET ORAL ONCE
Status: COMPLETED | OUTPATIENT
Start: 2023-12-12 | End: 2023-12-12

## 2023-12-12 RX ADMIN — CALCIUM CARBONATE-CHOLECALCIFEROL TAB 250 MG-125 UNIT 1 TABLET: 250-125 TAB at 10:10

## 2023-12-12 RX ADMIN — PANTOPRAZOLE SODIUM 40 MG: 40 TABLET, DELAYED RELEASE ORAL at 10:10

## 2023-12-12 RX ADMIN — GABAPENTIN 200 MG: 100 CAPSULE ORAL at 20:12

## 2023-12-12 RX ADMIN — GABAPENTIN 200 MG: 100 CAPSULE ORAL at 15:38

## 2023-12-12 RX ADMIN — INSULIN LISPRO 8 UNITS: 100 INJECTION, SOLUTION INTRAVENOUS; SUBCUTANEOUS at 10:09

## 2023-12-12 RX ADMIN — TICAGRELOR 90 MG: 90 TABLET ORAL at 10:10

## 2023-12-12 RX ADMIN — GABAPENTIN 200 MG: 100 CAPSULE ORAL at 10:10

## 2023-12-12 RX ADMIN — INSULIN LISPRO 8 UNITS: 100 INJECTION, SOLUTION INTRAVENOUS; SUBCUTANEOUS at 12:40

## 2023-12-12 RX ADMIN — OXYCODONE AND ACETAMINOPHEN 1 TABLET: 5; 325 TABLET ORAL at 17:08

## 2023-12-12 RX ADMIN — ASPIRIN 81 MG: 81 TABLET, COATED ORAL at 10:10

## 2023-12-12 RX ADMIN — PALIPERIDONE 9 MG: 3 TABLET, EXTENDED RELEASE ORAL at 10:10

## 2023-12-12 RX ADMIN — HEPARIN SODIUM 5000 UNITS: 5000 INJECTION INTRAVENOUS; SUBCUTANEOUS at 20:12

## 2023-12-12 RX ADMIN — Medication 1 CAPSULE: at 10:10

## 2023-12-12 RX ADMIN — ACETAMINOPHEN 650 MG: 325 TABLET ORAL at 05:43

## 2023-12-12 RX ADMIN — HEPARIN SODIUM 5000 UNITS: 5000 INJECTION INTRAVENOUS; SUBCUTANEOUS at 10:10

## 2023-12-12 RX ADMIN — AMLODIPINE BESYLATE 10 MG: 5 TABLET ORAL at 10:10

## 2023-12-12 RX ADMIN — SODIUM CHLORIDE: 9 INJECTION, SOLUTION INTRAVENOUS at 05:41

## 2023-12-12 RX ADMIN — ACETAMINOPHEN 650 MG: 325 TABLET ORAL at 16:16

## 2023-12-12 RX ADMIN — ATORVASTATIN CALCIUM 40 MG: 40 TABLET, FILM COATED ORAL at 20:12

## 2023-12-12 RX ADMIN — TICAGRELOR 90 MG: 90 TABLET ORAL at 20:12

## 2023-12-12 RX ADMIN — ISOSORBIDE MONONITRATE 30 MG: 30 TABLET, EXTENDED RELEASE ORAL at 10:10

## 2023-12-12 ASSESSMENT — PAIN DESCRIPTION - DESCRIPTORS: DESCRIPTORS: ACHING

## 2023-12-12 ASSESSMENT — PAIN DESCRIPTION - ORIENTATION
ORIENTATION: LOWER
ORIENTATION: LOWER

## 2023-12-12 ASSESSMENT — PAIN DESCRIPTION - LOCATION
LOCATION: BACK

## 2023-12-12 ASSESSMENT — PAIN SCALES - GENERAL
PAINLEVEL_OUTOF10: 7
PAINLEVEL_OUTOF10: 8
PAINLEVEL_OUTOF10: 8

## 2023-12-13 VITALS
DIASTOLIC BLOOD PRESSURE: 66 MMHG | BODY MASS INDEX: 19.67 KG/M2 | HEART RATE: 76 BPM | WEIGHT: 111 LBS | SYSTOLIC BLOOD PRESSURE: 160 MMHG | OXYGEN SATURATION: 99 % | TEMPERATURE: 98.5 F | RESPIRATION RATE: 18 BRPM | HEIGHT: 63 IN

## 2023-12-13 LAB
ALBUMIN SERPL-MCNC: 2.9 G/DL (ref 3.4–5)
ALBUMIN/GLOB SERPL: 0.8 {RATIO} (ref 1.1–2.2)
ALP SERPL-CCNC: 190 U/L (ref 40–129)
ALT SERPL-CCNC: 18 U/L (ref 10–40)
ANION GAP SERPL CALCULATED.3IONS-SCNC: 5 MMOL/L (ref 3–16)
AST SERPL-CCNC: 10 U/L (ref 15–37)
BASOPHILS # BLD: 0.1 K/UL (ref 0–0.2)
BASOPHILS NFR BLD: 1.1 %
BILIRUB SERPL-MCNC: 0.5 MG/DL (ref 0–1)
BUN SERPL-MCNC: 21 MG/DL (ref 7–20)
CALCIUM SERPL-MCNC: 8.2 MG/DL (ref 8.3–10.6)
CHLORIDE SERPL-SCNC: 112 MMOL/L (ref 99–110)
CO2 SERPL-SCNC: 16 MMOL/L (ref 21–32)
CREAT SERPL-MCNC: 1.5 MG/DL (ref 0.6–1.2)
DEPRECATED RDW RBC AUTO: 14.8 % (ref 12.4–15.4)
EOSINOPHIL # BLD: 0 K/UL (ref 0–0.6)
EOSINOPHIL NFR BLD: 0.9 %
GFR SERPLBLD CREATININE-BSD FMLA CKD-EPI: 38 ML/MIN/{1.73_M2}
GLUCOSE BLD-MCNC: 337 MG/DL (ref 70–99)
GLUCOSE BLD-MCNC: 349 MG/DL (ref 70–99)
GLUCOSE BLD-MCNC: 392 MG/DL (ref 70–99)
GLUCOSE SERPL-MCNC: 331 MG/DL (ref 70–99)
HCT VFR BLD AUTO: 23.2 % (ref 36–48)
HGB BLD-MCNC: 7.3 G/DL (ref 12–16)
LYMPHOCYTES # BLD: 1.4 K/UL (ref 1–5.1)
LYMPHOCYTES NFR BLD: 26 %
MCH RBC QN AUTO: 28 PG (ref 26–34)
MCHC RBC AUTO-ENTMCNC: 31.5 G/DL (ref 31–36)
MCV RBC AUTO: 88.7 FL (ref 80–100)
MONOCYTES # BLD: 0.4 K/UL (ref 0–1.3)
MONOCYTES NFR BLD: 6.9 %
NEUTROPHILS # BLD: 3.6 K/UL (ref 1.7–7.7)
NEUTROPHILS NFR BLD: 65.1 %
PERFORMED ON: ABNORMAL
PLATELET # BLD AUTO: 109 K/UL (ref 135–450)
PMV BLD AUTO: 10.2 FL (ref 5–10.5)
POTASSIUM SERPL-SCNC: 5.1 MMOL/L (ref 3.5–5.1)
PROT SERPL-MCNC: 6.5 G/DL (ref 6.4–8.2)
RBC # BLD AUTO: 2.61 M/UL (ref 4–5.2)
SODIUM SERPL-SCNC: 133 MMOL/L (ref 136–145)
WBC # BLD AUTO: 5.4 K/UL (ref 4–11)

## 2023-12-13 PROCEDURE — 97535 SELF CARE MNGMENT TRAINING: CPT

## 2023-12-13 PROCEDURE — 6370000000 HC RX 637 (ALT 250 FOR IP): Performed by: PSYCHIATRY & NEUROLOGY

## 2023-12-13 PROCEDURE — 6370000000 HC RX 637 (ALT 250 FOR IP): Performed by: INTERNAL MEDICINE

## 2023-12-13 PROCEDURE — 36415 COLL VENOUS BLD VENIPUNCTURE: CPT

## 2023-12-13 PROCEDURE — 97116 GAIT TRAINING THERAPY: CPT

## 2023-12-13 PROCEDURE — 6370000000 HC RX 637 (ALT 250 FOR IP): Performed by: STUDENT IN AN ORGANIZED HEALTH CARE EDUCATION/TRAINING PROGRAM

## 2023-12-13 PROCEDURE — 2580000003 HC RX 258: Performed by: INTERNAL MEDICINE

## 2023-12-13 PROCEDURE — 85025 COMPLETE CBC W/AUTO DIFF WBC: CPT

## 2023-12-13 PROCEDURE — 97110 THERAPEUTIC EXERCISES: CPT

## 2023-12-13 PROCEDURE — 6370000000 HC RX 637 (ALT 250 FOR IP): Performed by: NURSE PRACTITIONER

## 2023-12-13 PROCEDURE — 80053 COMPREHEN METABOLIC PANEL: CPT

## 2023-12-13 PROCEDURE — 6360000002 HC RX W HCPCS: Performed by: INTERNAL MEDICINE

## 2023-12-13 RX ORDER — OXYCODONE HYDROCHLORIDE AND ACETAMINOPHEN 5; 325 MG/1; MG/1
1 TABLET ORAL EVERY 8 HOURS PRN
Qty: 9 TABLET | Refills: 0 | Status: SHIPPED | OUTPATIENT
Start: 2023-12-13 | End: 2023-12-16

## 2023-12-13 RX ORDER — SODIUM BICARBONATE 650 MG/1
650 TABLET ORAL 2 TIMES DAILY
Status: DISCONTINUED | OUTPATIENT
Start: 2023-12-13 | End: 2023-12-13 | Stop reason: HOSPADM

## 2023-12-13 RX ORDER — BISACODYL 5 MG/1
5 TABLET, DELAYED RELEASE ORAL DAILY PRN
DISCHARGE
Start: 2023-12-13

## 2023-12-13 RX ORDER — SODIUM BICARBONATE 650 MG/1
650 TABLET ORAL 2 TIMES DAILY
DISCHARGE
Start: 2023-12-13

## 2023-12-13 RX ADMIN — HEPARIN SODIUM 5000 UNITS: 5000 INJECTION INTRAVENOUS; SUBCUTANEOUS at 09:21

## 2023-12-13 RX ADMIN — GABAPENTIN 200 MG: 100 CAPSULE ORAL at 14:10

## 2023-12-13 RX ADMIN — TICAGRELOR 90 MG: 90 TABLET ORAL at 09:19

## 2023-12-13 RX ADMIN — ISOSORBIDE MONONITRATE 30 MG: 30 TABLET, EXTENDED RELEASE ORAL at 09:19

## 2023-12-13 RX ADMIN — SODIUM BICARBONATE 650 MG: 650 TABLET ORAL at 14:10

## 2023-12-13 RX ADMIN — OXYCODONE AND ACETAMINOPHEN 1 TABLET: 5; 325 TABLET ORAL at 09:16

## 2023-12-13 RX ADMIN — ACETAMINOPHEN 650 MG: 325 TABLET ORAL at 12:30

## 2023-12-13 RX ADMIN — PANTOPRAZOLE SODIUM 40 MG: 40 TABLET, DELAYED RELEASE ORAL at 09:18

## 2023-12-13 RX ADMIN — PALIPERIDONE 9 MG: 3 TABLET, EXTENDED RELEASE ORAL at 09:20

## 2023-12-13 RX ADMIN — AMLODIPINE BESYLATE 10 MG: 5 TABLET ORAL at 09:18

## 2023-12-13 RX ADMIN — SODIUM CHLORIDE: 9 INJECTION, SOLUTION INTRAVENOUS at 06:15

## 2023-12-13 RX ADMIN — CALCIUM CARBONATE-CHOLECALCIFEROL TAB 250 MG-125 UNIT 1 TABLET: 250-125 TAB at 09:18

## 2023-12-13 RX ADMIN — Medication 1 CAPSULE: at 09:18

## 2023-12-13 RX ADMIN — INSULIN LISPRO 16 UNITS: 100 INJECTION, SOLUTION INTRAVENOUS; SUBCUTANEOUS at 12:27

## 2023-12-13 RX ADMIN — GABAPENTIN 200 MG: 100 CAPSULE ORAL at 09:19

## 2023-12-13 RX ADMIN — ASPIRIN 81 MG: 81 TABLET, COATED ORAL at 09:19

## 2023-12-13 RX ADMIN — INSULIN LISPRO 12 UNITS: 100 INJECTION, SOLUTION INTRAVENOUS; SUBCUTANEOUS at 09:20

## 2023-12-13 ASSESSMENT — PAIN SCALES - GENERAL
PAINLEVEL_OUTOF10: 3
PAINLEVEL_OUTOF10: 8

## 2023-12-13 ASSESSMENT — PAIN DESCRIPTION - ORIENTATION: ORIENTATION: LOWER

## 2023-12-13 ASSESSMENT — PAIN DESCRIPTION - LOCATION
LOCATION: HEAD
LOCATION: BACK

## 2023-12-13 NOTE — CONSULTS
12/13/23 1045   Wound 11/03/23 Foot Left   Date First Assessed/Time First Assessed: 11/03/23 0202   Present on Original Admission: Yes  Primary Wound Type: Diabetic Ulcer  Location: Foot  Wound Location Orientation: Left   Wound Image    (small marked in error is Left foot)   Wound Etiology Diabetic Watkins 1   Dressing/Treatment Open to air   Wound Length (cm) 3 cm   Wound Width (cm) 3.5 cm   Wound Depth (cm) 0 cm   Wound Surface Area (cm^2) 10.5 cm^2   Change in Wound Size % (l*w) -180   Wound Volume (cm^3) 0 cm^3   Wound Healing % 100   Distance Tunneling (cm) 0 cm   Tunneling Position ___ O'Clock 0   Undermining Starts ___ O'Clock 0   Undermining Ends___ O'Clock 0   Undermining Maxium Distance (cm) 0   Wound Assessment Dry   Drainage Amount None (dry)   Odor None   Adrienne-wound Assessment Dry/flaky   Margins Unattached edges   Wound Thickness Description not for Pressure Injury Full thickness   Wound Care evaluated left foot diabetic ulcer dry callous'. Patient needs to have Podiatrist shave areas. No open areas noted within callous. Perfect served Dr. Juliet Castro for consult for Podiatry. Wound Care to sign off.

## 2023-12-13 NOTE — PROGRESS NOTES
4 Eyes Skin Assessment     NAME:  Estefany Manley  YOB: 1960  MEDICAL RECORD NUMBER:  [id number]    The patient is being assessed for  Admission    I agree that at least one RN has performed a thorough Head to Toe Skin Assessment on the patient. ALL assessment sites listed below have been assessed. Areas assessed by both nurses:    Head, Face, Ears, Shoulders, Back, Chest, Arms, Elbows, Hands, Sacrum. Buttock, Coccyx, Ischium, and Legs. Feet and Heels        Does the Patient have a Wound? Yes wound(s) were present on assessment.  LDA wound assessment was Initiated and completed by RN Healing fissure between buttocks, L foot wound/open to air, small round callous on right foot       Maco Prevention initiated by RN: Yes  Wound Care Orders initiated by RN: No    Pressure Injury (Stage 3,4, Unstageable, DTI, NWPT, and Complex wounds) if present, place Wound referral order by RN under : No    New Ostomies, if present place, Ostomy referral order under : No     Nurse 1 eSignature: Electronically signed by Wilfredo Middleton RN on 12/10/23 at 6:18 PM EST        Nurse 2 eSignature: Electronically signed by Margareth Wallace RN on 12/10/23 at 6:34 PM EST
Perfect serve message to MD  Blood sugar 360.  order is to notify MD >349    Awaiting response
Physical Therapy  Facility/Department: [facility] C5 - MED SURG/ORTHO  Physical Therapy Initial Assessment    Name: Edwar Starr  : 1960  MRN: [id number]  Date of Service: 2023    Discharge Recommendations:  Subacute/Skilled Nursing Facility   PT Equipment Recommendations  Equipment Needed: No  Other: TBD at SNF    Therapy discharge recommendations take into account each patient's current medical complexities and are subject to input/oversight from the patient's healthcare team.     Barriers to Home Discharge:   [x] Steps to access home entry or bed/bath: [address] building, 16 steps up to apartment building (no elevator access)   [x] Unable to transfer, ambulate, or propel wheelchair household distances without assist   [x] Limited available assist at home upon discharge (pt lives alone)   [x] Patient or family requests d/c to post-acute facility    [] Poor cognition/safety awareness for d/c to home alone    [] Unable to maintain ordered weight bearing status    [] Patient with salient signs of long-standing immobility   [x] Decreased independence with ADLs   [x] Increased risk for falls    If pt is unable to be seen after this session, please let this note serve as discharge summary. Please see case management note for discharge disposition. Thank you. Patient Diagnosis(es): The primary encounter diagnosis was Acute encephalopathy. Diagnoses of Pneumonia due to infectious organism, unspecified laterality, unspecified part of lung and Failure to thrive in adult were also pertinent to this visit.   Past Medical History:  has a past medical history of Abnormal brain MRI, Acute bilateral low back pain without sciatica, SHARRON (acute kidney injury) ([address]), Arthritis, Bipolar disorder ([address]), CAD (coronary artery disease), Cancer ([address]), Carotid stenosis, bilateral:<50%:per [place] 2016, Carpal tunnel syndrome, Cervical cancer screening, Coronary artery disease of native artery of native heart with stable angina
Pt arrived to unit C5 via transport.
RN sent down patient's own medications to pharmacy department.      Atorvastatin calcium 40mg tablet    Isosorbide mononitrate  ER 30mg tablet    Brilinta 90mg tablet    Calcium/D3 600mg tablet: 3 bottles    Melatonin 1mg tablet    Cetirizine HCL 10mg tablet    Glimiperide 4mg tablet    Austedo 12mg tablet    Aspirin 81mg tablet    Pioglitazone 30mg tablet    Aspirin 325mg tablet     Docusate Sodium 100mg tablet    Amlodipine Besylate 10mg tablet    Trazodone 150mg tablet    Gabapentin 600 mg tablet: 48 tablet : counted and counterchecked with [employer]
Secure message to MD regarding patients pain. Patient takes percocet at home for chronic back pain. No pain medication is currently ordered. Asked MD to please put this in for patient.
TODAY'S DATE:  12/10/2023      Current NIHSS 2      Discussed personal risk factors for Stroke/TIA with patient/family, and ways to reduce the risk for a recurrent stroke. Patient's personal risk factors which were identified are:   []   Alcohol Abuse: check with your physician before any alcohol consumption. []   Atrial fibrillation: may cause blood clots  []   Drug Abuse: Seek help, talk with your doctor  []   Clotting Disorder  [x]   Diabetes  []   Family history of stroke or heart disease  [x]   High Blood Pressure/Hypertension: work with your physician  [x]   High cholesterol: monitor cholesterol levels with your physician  []   Overweight/Obesity: work with your physician for your ideal body weight  []   Physical Inactivity: get regular exercise as directed by your physician  []   Personal history of previous TIA or stroke  []   Poor Diet: decrease salt (sodium) in your diet, follow diet directed by physician  []   Smoking: stop smoking      Reviewed the Following Education with Patient and/or Family:   - Signs and Symptoms of Stroke  - Personal risk factors   - How to activate EMS (911)   - Importance of Follow Up Appointments at Discharge   - Importance of Compliance with Medications Prescribed at Discharge  - Available community resources and stroke advocacy groups if needed    Patient and/or family member: verbalized understanding. Stroke Education booklet given to patient/family (or verified, if given already), which reviews above information.  yes         Electronically signed by Wilfredo Middleton RN on 12/10/2023 at 7:39 PM
This RN attempted twice to call in patient report to [facility], no answer. IV and tele removed per protocol. Patient home medications were picked up from pharmacy and sent with patient, along with paper prescription. Patient transported off the unit with transport with all belongings.
V2.0  [facility] [place]      Name:  Chelita Navarro /Age/Sex: 1960  (58 y.o. female)   MRN & CSN:  [id number] & [id number] Encounter Date/Time: 2023 1:10 PM EST    Location:  [facility]/9120-20 PCP: SARTHAK Jamison NP       Hospital Day: 3    Assessment and Plan:   Chelita Navarro is a 58 y.o. female with pmh of colon cancer s/p right hemicolectomy, hypertension, hyperlipidemia, bipolar disorder, anxiety, depression, type 2 diabetes, CAD, gout, CKD who presents with Confusion      Plan:  Metabolic encephalopathy: No clear evidence of infection at this point, monitor off antibiotics, family unable to take care of patient at home, PT OT has been consulted, mentation improved hold narcotics and sedating medications  Bipolar disorder: Psychiatry on board, Invega 9 mg daily has been started  Type 2 diabetes: Blood glucose elevated, currently on high-dose sliding scale with hypoglycemia treatment ordered to maintain blood glucose between 140 to 180 mg per DL while inpatient, adjust insulin as needed, does not have glucometer at home per pt  Hypertension: Continue home meds  GERD: Continue pantoprazole  Left sole ulcer - wound care     Diet ADULT DIET; Regular; 3 carb choices (45 gm/meal); Low Sodium (2 gm)   DVT Prophylaxis [] Lovenox, []  Heparin, [] SCDs, [] Ambulation,  [] Eliquis, [] Xarelto  [] Coumadin   Code Status Full Code   Disposition From: SNF  Expected Disposition: pending precert   Surrogate Decision Maker/ POA      Subjective:     Chief Complaint: Hyperglycemia (Family states that pt was not returning phone calls or answering the door so the fire department checked on her. Family states that she typically has low blood sugar but this morning was in the 400s.  Family states that speech seems more slurred. ) and Aphasia        MS improved , ans questions appropriately  C/o chronic  loose  stool   No N , V or ab pain          Review of Systems:    Review of Systems as
Depression/anxiety, Diabetes mellitus ([facility]), Gout, History of mammogram, History of therapeutic radiation, Hyperlipidemia, Hypertension, 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 stage 3 chronic kidney disease ([facility]), Microalbuminuria, Neuropathy in diabetes ([facility]), Non morbid obesity, Pancreatitis, S/P endoscopy, Scoliosis, Spondylosis of lumbar region without myelopathy or radiculopathy, Transient cerebral ischemia, and Unspecified cerebral artery occlusion with cerebral infarction. Past Surgical History:  has a past surgical history that includes Kidney removal; Hysterectomy; Breast lumpectomy (2015); Tubal ligation; other surgical history (Right); Cardiac catheterization (06/08/2020); Upper gastrointestinal endoscopy (N/A, 1/29/2021); Colonoscopy (N/A, 2/1/2021); CT BIOPSY BONE MARROW (2/3/2021); Temporal Artery Biopsy (Right, 8/9/2021); Upper gastrointestinal endoscopy (N/A, 12/15/2022); Colonoscopy (N/A, 2/27/2023); and hemicolectomy (N/A, 3/7/2023). Assessment   Performance deficits / Impairments: Decreased functional mobility ; Decreased safe awareness;Decreased balance;Decreased coordination;Decreased ADL status; Decreased endurance;Decreased high-level IADLs;Decreased strength;Decreased cognition  Assessment: Pt presents to [facility] [place] with confusion and hyperglycemia. Pt PLOF IND with ADLs and functional mobility, pt with 16 steps to get into apartment, family assists with IADLs. Pt this date functioning below baseline, required CGA for functional mobility with RW (without RW pt required Min A for sit>stand). Pt CGA for functional tasks including toileting and grooming. Pt would continue to benefit from acute OT at this time to improve functional mobility and ADL independence. D/c recs for SNF when medically ready.      Decision Making: Medium Complexity  REQUIRES OT FOLLOW-UP: Yes  Activity Tolerance  Activity Tolerance: Patient limited by fatigue        Plan
WFL  Arousal/Alertness: Delayed responses to stimuli  Following Commands: Follows one step commands with repetition  Attention Span: Attends with cues to redirect  Problem Solving: Assistance required to identify errors made;Assistance required to correct errors made  Insights: Decreased awareness of deficits  Initiation: Requires cues for some  Sequencing: Requires cues for some     Objective   Vitals     Vitals:    12/13/23    BP: (!) 160/66   Pulse: 76   Resp:    Temp:    SpO2: 99%       Bed Mobility Training  Bed Mobility Training: No  Interventions: Safety awareness training;Verbal cues; Tactile cues;Manual cues  Rolling: Stand-by assistance;Setup  Supine to Sit: Stand-by assistance; Additional time (HOB elevated, use of unilateral bedrail w/ LUE)  Scooting: Supervision; Additional time (w/ unilateral handrail w/ LUE)  Balance  Sitting: Intact  Standing: Impaired  Standing - Static: Fair  Standing - Dynamic: Fair (RW)  Transfer Training  Transfer Training: Yes  Interventions: Verbal cues; Safety awareness training  Sit to Stand: Contact-guard assistance; Adaptive equipment; Additional time (RW)  Stand to Sit: Contact-guard assistance; Adaptive equipment; Additional time (RW)  Stand Pivot Transfers: Contact-guard assistance; Additional time (w/ RW)  Bed to Chair: Contact-guard assistance; Adaptive equipment; Additional time (w/ RW)  Toilet Transfer:  (pt declining toileting needs this date.)  Gait Training  Gait Training: Yes  Gait  Overall Level of Assistance: Contact-guard assistance  Distance (ft): 100 Feet  Assistive Device: Gait belt;Walker, rolling  Interventions: Safety awareness training;Verbal cues; Visual cues  Step Length: Left shortened;Right shortened  Gait Abnormalities: Decreased step clearance     PT Exercises  Exercise Treatment: Seated BLE exercises 10x each: AP, LAQ, marches, hip abduction, glut sets     Safety Devices  Type of Devices: Nurse notified; Left in chair;Chair alarm in place;Call light within
LUE)  Balance  Sitting: Intact  Standing: Impaired  Standing - Static: Occasional;Good  Standing - Dynamic: Occasional;Fair  Transfer Training  Transfer Training: Yes  Interventions: Verbal cues; Safety awareness training  Sit to Stand: Contact-guard assistance; Adaptive equipment; Additional time (w/ RW)  Stand to Sit: Contact-guard assistance; Adaptive equipment; Additional time (w/ RW)  Stand Pivot Transfers: Contact-guard assistance; Additional time (w/ RW)  Bed to Chair: Contact-guard assistance; Adaptive equipment; Additional time (w/ RW)  Toilet Transfer:  (pt declining toileting needs this date.)     ADL  Feeding: Independent  Feeding Skilled Clinical Factors: pt finishing up eating breakfast upon arrival  Grooming: Supervision  Grooming Skilled Clinical Factors: seated in chair, pt politely declining household mobility to bathroom d/t self-reported inc lower back pain and fatigue this date. LE Dressing Skilled Clinical Factors: pt B socks donned prior to OT arrival  Toileting Skilled Clinical Factors: pt declining toileting needs this date  Functional Mobility Skilled Clinical Factors: pt declining functional mobility this AM d/t inc LB pain and fatigue -- pt also requested to sit in chair immediately to finish eating breakfast  Skin Care: Soap and water  OT Exercises  A/AROM Exercises: seated BUE therex x15 reps: bicep curls, shoulder ABD, wrist flex/ ext, forearm sup/pronation. Pt also completed additional BLE therex per request in prep for STS d/t pt reporting increased weakness initially after sitting EOB (x15 marches, B knee ext, and heel pumps)     Safety Devices  Type of Devices: Nurse notified; Left in chair;Chair alarm in place;Call light within reach; All fall risk precautions in place;Gait belt  Restraints  Restraints Initially in Place: No     Patient Education  Education Given To: Patient  Education Provided: Role of Therapy;Transfer Training;Equipment;Precautions;Orientation;Plan of Care  Education
THE HEAD WITHOUT CONTRAST  12/10/2023 1:44 pm TECHNIQUE: CT of the head was performed without the administration of intravenous contrast. Automated exposure control, iterative reconstruction, and/or weight based adjustment of the mA/kV was utilized to reduce the radiation dose to as low as reasonably achievable. COMPARISON: 11/02/2023 HISTORY: ORDERING SYSTEM PROVIDED HISTORY: AMS TECHNOLOGIST PROVIDED HISTORY: Has a \"code stroke\" or \"stroke alert\" been called? ->No Reason for exam:->AMS Decision Support Exception - unselect if not a suspected or confirmed emergency medical condition->Emergency Medical Condition (MA) Reason for Exam: Ams, with headaches x 1 week Relevant Medical/Surgical History: H/o mini strokes FINDINGS: BRAIN/VENTRICLES: There is no acute intracranial hemorrhage, mass effect or midline shift. No abnormal extra-axial fluid collection. The gray-white differentiation is maintained without evidence of an acute infarct. There is no evidence of hydrocephalus. ORBITS: The visualized portion of the orbits demonstrate no acute abnormality. SINUSES: The visualized paranasal sinuses and mastoid air cells demonstrate no acute abnormality. SOFT TISSUES/SKULL:  No acute abnormality of the visualized skull or soft tissues. No acute intracranial abnormality. XR CHEST PORTABLE    Result Date: 12/10/2023  EXAMINATION: ONE XRAY VIEW OF THE CHEST 12/10/2023 1:21 pm COMPARISON: 11/27/2023 HISTORY: ORDERING SYSTEM PROVIDED HISTORY: AMS TECHNOLOGIST PROVIDED HISTORY: Reason for exam:->AMS Reason for Exam: AMS FINDINGS: There is a mild infiltrate noted in the the left lower lobe medially. The lungs otherwise are clear. Heart and mediastinal structures appear normal. Bony thorax appears normal.     Mild infiltrate noted in the the left lower lobe medially. This could represent pneumonia and requires clinical correlation.        Electronically signed by Martina Davies MD on 12/11/2023 at 1:10 PM

## 2023-12-13 NOTE — DISCHARGE SUMMARY
Consults:     IP CONSULT TO HOSPITALIST  IP CONSULT TO PSYCHIATRY  IP CONSULT TO SOCIAL WORK    Labs:     Recent Labs     12/11/23  0538 12/12/23  0554 12/13/23  0604   WBC 4.3 7.0 5.4   HGB 9.8* 9.8* 7.3*   HCT 31.6* 31.0* 23.2*   * 152 109*     Recent Labs     12/11/23  0538 12/12/23  0554 12/13/23  0604    135* 133*   K 4.5 5.3* 5.1    108 112*   CO2 23 17* 16*   BUN 24* 21* 21*   CREATININE 1.5* 1.7* 1.5*   CALCIUM 8.3 9.1 8.2*     Recent Labs     12/10/23  1549 12/10/23  1710   TROPHS 82* 76*     Recent Labs     12/11/23  0538   LABA1C 8.8     Recent Labs     12/11/23  0538 12/12/23  0554 12/13/23  0604   AST 18 21 10*   ALT 26 30 18   BILITOT 0.3 0.4 0.5   ALKPHOS 208* 266* 190*     No results for input(s): \"INR\", \"LACTA\", \"TSH\" in the last 72 hours. Urine Cultures:   Lab Results   Component Value Date/Time    LABURIN >100,000 CFU/ml 11/30/2023 02:47 PM     Blood Cultures:   Lab Results   Component Value Date/Time    [facility]  12/10/2023 01:15 PM     No Growth to date. Any change in status will be called. Lab Results   Component Value Date/Time    BLOODCULT2  12/10/2023 01:15 PM     No Growth to date. Any change in status will be called.      Organism:   Lab Results   Component Value Date/Time    ORG Staphylococcus lugdunensis 11/30/2023 02:47 PM       Signed:    Esme Delacruz MD

## 2023-12-13 NOTE — CONSULTS
[address] [place] [address]                                  CONSULTATION    PATIENT NAME: Otto Meneses                      :        1960  MED REC NO:   [id number]                          ROOM:       8950  ACCOUNT NO:   [de-identified]                           ADMIT DATE: 12/10/2023  PROVIDER:     Gisel Shepherd MD    CONSULT DATE:  2023    PSYCHIATRY CONSULTATION    REQUESTING PROVIDER:  Frieda De Anda MD.    HISTORY OF PRESENT ILLNESS:  The patient is a 59-year-old female with a  past medical history of hyperglycemia, poor compliance with healthcare  issues, hypertension, hyperlipidemia, and apparently a diagnosis of  bipolar disorder, who presented to the hospital because of worsening  confusion and hypoglycemia. Psychiatry was asked to evaluate the  patient given her history of bipolar disorder to help sort through  medications related to that. The patient tells me that she came to the hospital and stated that she  has been given recent glucometer but had already lost it, so she has  gone a week or so without checking blood sugars. She also states she  has been having a hard time getting around and so she has been eating  soup more or less for the last week and as her blood sugars have been  out of whack, she has been feeling weak and one thing led to another and  she simply could not continue at home and really does not remember too  much about how she actually presented to the hospital.    She does report a history of bipolar disorder and has been hospitalized  psychiatrically many years ago. I searched her Epic for records and did  not identify anything back to , although I think, I had the patient  on the inpatient unit of [facility] many years ago.   She has felt  most of her mood problems are stemming from depression, although did  describe a history of jose and with that, how that presented she

## 2023-12-13 NOTE — CARE COORDINATION
CASE MANAGEMENT DISCHARGE SUMMARY      Discharge to: [facility] completed: [facility] Exemption Notification (HENS) completed: YES    New Durable Medical Equipment ordered/agency: defer to SNF    Transportation:    Medical Transport explained to pt/family. Pt/family voice no agency preference. Agency used:[facility]   time:4pm   Ambulance form completed: Yes    Confirmed discharge plan with:     Patient: yes. Family:  yes. Debbie Gaspar [phone]     Facility/Agency, name:  [facility]    Phone number for report to facility: [phone]      RN, name: Eli Sharp    Note: Discharging nurse to complete [facility], reconcile [facility], and place final copy with patient's discharge packet. RN to ensure that written prescriptions for  Level II medications are sent with patient to the facility as per protocol.
Chart reviewed day 2. Pt is followed by IM and Psych. Pt admitted for treatment of metabolic encephalopathy. Per Psych pt can d/c once medically stable. Awaiting IM recs. Cert placed for [facility] [place] per pt preference. PASS completed per [facility] [place] request. Pt IPTA alone, pt is unsafe to return home alone at this time. Will follow for needs as they arise. Electronically signed by Jeaneth Lawrence RN on 12/12/2023 at 9:12 AM    Called placed to Cherri Maurer r/t cert placement.  Auth # is [id number]
Transport scheduled with [facility] at 1600. CM is aware.
asap.    Pt is followed by and Psych. Pt admitted w/metabolic encephalopathy. IV abx stopped. Per psych pt started on Invega. Will follow for needs as they arise. The Plan for Transition of Care is related to the following treatment goals of Confusion [R41.0]  Failure to thrive in adult [R62.7]  Acute encephalopathy [G93.40]  Pneumonia due to infectious organism, unspecified laterality, unspecified part of lung [E93.3]    IF APPLICABLE: The Patient and/or patient representative Regina Dawn and her family were provided with a choice of provider and agrees with the discharge plan. Freedom of choice list with basic dialogue that supports the patient's individualized plan of care/goals and shares the quality data associated with the providers was provided to:     Patient Representative Name:       The Patient and/or Patient Representative Agree with the Discharge Plan?       Gina Lou RN  Case Management Department

## 2023-12-13 NOTE — DISCHARGE INSTR - COC
Open to air 12/13/23 1045   Offloading for Diabetic Foot Ulcers Offloading ordered 11/28/23 0909   Dressing Change Due 12/02/23 12/01/23 0800   Wound Length (cm) 3 cm 12/13/23 1045   Wound Width (cm) 3.5 cm 12/13/23 1045   Wound Depth (cm) 0 cm 12/13/23 1045   Wound Surface Area (cm^2) 10.5 cm^2 12/13/23 1045   Change in Wound Size % (l*w) -180 12/13/23 1045   Wound Volume (cm^3) 0 cm^3 12/13/23 1045   Wound Healing % 100 12/13/23 1045   Distance Tunneling (cm) 0 cm 12/13/23 1045   Tunneling Position ___ O'Clock 0 12/13/23 1045   Undermining Starts ___ O'Clock 0 12/13/23 1045   Undermining Ends___ O'Clock 0 12/13/23 1045   Undermining Maxium Distance (cm) 0 12/13/23 1045   Wound Assessment Dry 12/13/23 1045   Drainage Amount None (dry) 12/13/23 1045   Odor None 12/13/23 1045   Adrienne-wound Assessment Dry/flaky 12/13/23 1045   Margins Unattached edges 12/13/23 1045   Wound Thickness Description not for Pressure Injury Full thickness 12/13/23 1045   Number of days: 40       Incision 03/07/23 Abdomen Lower;Medial (Active)   Number of days: 280       Incision 03/07/23 Abdomen Lower;Medial (Active)   Number of days: 280        Elimination:  Continence: Bowel: Yes  Bladder: Yes  Urinary Catheter: None   Colostomy/Ileostomy/Ileal Conduit: No       Date of Last BM: 12/12/23  No intake or output data in the 24 hours ending 12/13/23 1333  No intake/output data recorded. Safety Concerns: At Risk for Falls    Impairments/Disabilities:      None    Nutrition Therapy:  Current Nutrition Therapy:   - Oral Diet:  Carb Control 5 carbs/meal (2000kcals/day)    Routes of Feeding: Oral  Liquids: No Restrictions  Daily Fluid Restriction: no  Last Modified Barium Swallow with Video (Video Swallowing Test): not done    Treatments at the Time of Hospital Discharge:   Respiratory Treatments:   Oxygen Therapy:  is not on home oxygen therapy.   Ventilator:    - No ventilator support    Rehab Therapies: Physical Therapy and Occupational

## 2023-12-14 ENCOUNTER — HOSPITAL ENCOUNTER (EMERGENCY)
Age: 63
Discharge: HOME | End: 2023-12-14
Attending: STUDENT IN AN ORGANIZED HEALTH CARE EDUCATION/TRAINING PROGRAM
Payer: MEDICAID

## 2023-12-14 VITALS
TEMPERATURE: 98.5 F | DIASTOLIC BLOOD PRESSURE: 60 MMHG | OXYGEN SATURATION: 100 % | SYSTOLIC BLOOD PRESSURE: 143 MMHG | HEART RATE: 61 BPM | RESPIRATION RATE: 13 BRPM | BODY MASS INDEX: 20.73 KG/M2 | WEIGHT: 117 LBS

## 2023-12-14 DIAGNOSIS — R73.9 HYPERGLYCEMIA: Primary | ICD-10-CM

## 2023-12-14 LAB
BACTERIA BLD CULT ORG #2: NORMAL
BACTERIA BLD CULT: NORMAL
BASE EXCESS BLDV CALC-SCNC: -7.9 MMOL/L (ref -3–3)
BASOPHILS # BLD: 0.1 K/UL (ref 0–0.2)
BASOPHILS NFR BLD: 0.9 %
CO2 BLDV-SCNC: 17 MMOL/L
COHGB MFR BLDV: 7.8 % (ref 0–1.5)
DEPRECATED RDW RBC AUTO: 15.3 % (ref 12.4–15.4)
EOSINOPHIL # BLD: 0.1 K/UL (ref 0–0.6)
EOSINOPHIL NFR BLD: 0.8 %
GLUCOSE BLD-MCNC: 329 MG/DL (ref 70–99)
GLUCOSE BLD-MCNC: 369 MG/DL (ref 70–99)
GLUCOSE BLD-MCNC: 405 MG/DL (ref 70–99)
HCO3 BLDV-SCNC: 16 MMOL/L (ref 23–29)
HCT VFR BLD AUTO: 27.1 % (ref 36–48)
HGB BLD-MCNC: 8.4 G/DL (ref 12–16)
LYMPHOCYTES # BLD: 1.2 K/UL (ref 1–5.1)
LYMPHOCYTES NFR BLD: 19.6 %
MCH RBC QN AUTO: 28 PG (ref 26–34)
MCHC RBC AUTO-ENTMCNC: 31.1 G/DL (ref 31–36)
MCV RBC AUTO: 90.2 FL (ref 80–100)
METHGB MFR BLDV: 0.3 %
MONOCYTES # BLD: 0.4 K/UL (ref 0–1.3)
MONOCYTES NFR BLD: 6.5 %
NEUTROPHILS # BLD: 4.6 K/UL (ref 1.7–7.7)
NEUTROPHILS NFR BLD: 72.2 %
O2 THERAPY: ABNORMAL
PCO2 BLDV: 27.3 MMHG (ref 40–50)
PERFORMED ON: ABNORMAL
PH BLDV: 7.38 [PH] (ref 7.35–7.45)
PLATELET # BLD AUTO: 138 K/UL (ref 135–450)
PMV BLD AUTO: 10.3 FL (ref 5–10.5)
PO2 BLDV: 122.4 MMHG (ref 25–40)
RBC # BLD AUTO: 3 M/UL (ref 4–5.2)
REASON FOR REJECTION: NORMAL
REJECTED TEST: NORMAL
SAO2 % BLDV: 99 %
WBC # BLD AUTO: 6.4 K/UL (ref 4–11)

## 2023-12-14 PROCEDURE — 82803 BLOOD GASES ANY COMBINATION: CPT

## 2023-12-14 PROCEDURE — 85025 COMPLETE CBC W/AUTO DIFF WBC: CPT

## 2023-12-14 PROCEDURE — 96372 THER/PROPH/DIAG INJ SC/IM: CPT

## 2023-12-14 PROCEDURE — 6370000000 HC RX 637 (ALT 250 FOR IP): Performed by: NURSE PRACTITIONER

## 2023-12-14 PROCEDURE — 99284 EMERGENCY DEPT VISIT MOD MDM: CPT

## 2023-12-14 RX ORDER — INSULIN LISPRO 100 [IU]/ML
8 INJECTION, SOLUTION INTRAVENOUS; SUBCUTANEOUS ONCE
Status: COMPLETED | OUTPATIENT
Start: 2023-12-14 | End: 2023-12-14

## 2023-12-14 RX ADMIN — INSULIN LISPRO 8 UNITS: 100 INJECTION, SOLUTION INTRAVENOUS; SUBCUTANEOUS at 11:18

## 2023-12-14 ASSESSMENT — PAIN DESCRIPTION - LOCATION: LOCATION: BACK

## 2023-12-14 ASSESSMENT — PAIN DESCRIPTION - PAIN TYPE: TYPE: CHRONIC PAIN

## 2023-12-14 ASSESSMENT — PAIN SCALES - GENERAL: PAINLEVEL_OUTOF10: 9

## 2023-12-14 ASSESSMENT — PAIN - FUNCTIONAL ASSESSMENT: PAIN_FUNCTIONAL_ASSESSMENT: 0-10

## 2023-12-14 NOTE — ED NOTES
Lab unable to obtain repeat CMP. States she attempted x1, unsuccessful and patient refused second attempt.       Sol Blackburn RN  12/14/23 3258

## 2023-12-14 NOTE — ED NOTES
Report called to [id number] [place] [address] at [facility].       Chris Alan RN  12/14/23 2260

## 2023-12-14 NOTE — ED NOTES
Report given to [address] [place] [address] team. Patient discharged with all belongings and discharge paperwork. Patient and her nurse Michelle Roth verbalized understanding of discharge instructions and follow up. Patient taken by stretcher out of ED back to [facility].       Harjinder Lovell RN  12/14/23 3119

## 2023-12-26 ENCOUNTER — APPOINTMENT (OUTPATIENT)
Dept: GENERAL RADIOLOGY | Age: 63
DRG: 420 | End: 2023-12-26
Payer: MEDICAID

## 2023-12-26 ENCOUNTER — HOSPITAL ENCOUNTER (INPATIENT)
Age: 63
LOS: 8 days | Discharge: NURSING, REHAB OR LONG-TERM CARE | DRG: 420 | End: 2024-01-03
Attending: STUDENT IN AN ORGANIZED HEALTH CARE EDUCATION/TRAINING PROGRAM | Admitting: INTERNAL MEDICINE
Payer: MEDICAID

## 2023-12-26 ENCOUNTER — APPOINTMENT (OUTPATIENT)
Dept: CT IMAGING | Age: 63
DRG: 420 | End: 2023-12-26
Payer: MEDICAID

## 2023-12-26 DIAGNOSIS — G89.4 CHRONIC PAIN DISORDER: ICD-10-CM

## 2023-12-26 DIAGNOSIS — E16.2 HYPOGLYCEMIA: ICD-10-CM

## 2023-12-26 DIAGNOSIS — R07.9 CHEST PAIN, UNSPECIFIED TYPE: ICD-10-CM

## 2023-12-26 DIAGNOSIS — R41.82 ALTERED MENTAL STATUS, UNSPECIFIED ALTERED MENTAL STATUS TYPE: Primary | ICD-10-CM

## 2023-12-26 DIAGNOSIS — D64.9 NORMOCYTIC ANEMIA: ICD-10-CM

## 2023-12-26 LAB
ABO + RH BLD: NORMAL
ALBUMIN SERPL-MCNC: 3.2 G/DL (ref 3.4–5)
ALBUMIN/GLOB SERPL: 0.8 {RATIO} (ref 1.1–2.2)
ALP SERPL-CCNC: 223 U/L (ref 40–129)
ALT SERPL-CCNC: 39 U/L (ref 10–40)
ANION GAP SERPL CALCULATED.3IONS-SCNC: 7 MMOL/L (ref 3–16)
APAP SERPL-MCNC: <5 UG/ML (ref 10–30)
AST SERPL-CCNC: 49 U/L (ref 15–37)
BASE EXCESS BLDV CALC-SCNC: 0.4 MMOL/L (ref -3–3)
BASOPHILS # BLD: 0 K/UL (ref 0–0.2)
BASOPHILS NFR BLD: 0.5 %
BILIRUB SERPL-MCNC: <0.2 MG/DL (ref 0–1)
BILIRUB UR QL STRIP.AUTO: NEGATIVE
BLD GP AB SCN SERPL QL: NORMAL
BLOOD BANK DISPENSE STATUS: NORMAL
BLOOD BANK PRODUCT CODE: NORMAL
BPU ID: NORMAL
BUN SERPL-MCNC: 19 MG/DL (ref 7–20)
CALCIUM SERPL-MCNC: 8.5 MG/DL (ref 8.3–10.6)
CHLORIDE SERPL-SCNC: 107 MMOL/L (ref 99–110)
CLARITY UR: CLEAR
CO2 BLDV-SCNC: 27 MMOL/L
CO2 SERPL-SCNC: 25 MMOL/L (ref 21–32)
COHGB MFR BLDV: 3.3 % (ref 0–1.5)
COLOR UR: ABNORMAL
CREAT SERPL-MCNC: 1.8 MG/DL (ref 0.6–1.2)
DEPRECATED RDW RBC AUTO: 16.1 % (ref 12.4–15.4)
DESCRIPTION BLOOD BANK: NORMAL
EKG ATRIAL RATE: 67 BPM
EKG DIAGNOSIS: NORMAL
EKG P AXIS: 49 DEGREES
EKG P-R INTERVAL: 152 MS
EKG Q-T INTERVAL: 432 MS
EKG QRS DURATION: 94 MS
EKG QTC CALCULATION (BAZETT): 456 MS
EKG R AXIS: -9 DEGREES
EKG T AXIS: 51 DEGREES
EKG VENTRICULAR RATE: 67 BPM
EOSINOPHIL # BLD: 0 K/UL (ref 0–0.6)
EOSINOPHIL NFR BLD: 0.7 %
EPI CELLS #/AREA URNS HPF: NORMAL /HPF (ref 0–5)
ETHANOLAMINE SERPL-MCNC: NORMAL MG/DL (ref 0–0.08)
GFR SERPLBLD CREATININE-BSD FMLA CKD-EPI: 31 ML/MIN/{1.73_M2}
GLUCOSE BLD-MCNC: 100 MG/DL (ref 70–99)
GLUCOSE BLD-MCNC: 105 MG/DL (ref 70–99)
GLUCOSE BLD-MCNC: 107 MG/DL (ref 70–99)
GLUCOSE BLD-MCNC: 109 MG/DL (ref 70–99)
GLUCOSE BLD-MCNC: 112 MG/DL (ref 70–99)
GLUCOSE BLD-MCNC: 113 MG/DL (ref 70–99)
GLUCOSE BLD-MCNC: 151 MG/DL (ref 70–99)
GLUCOSE BLD-MCNC: 229 MG/DL (ref 70–99)
GLUCOSE BLD-MCNC: 32 MG/DL (ref 70–99)
GLUCOSE BLD-MCNC: 34 MG/DL (ref 70–99)
GLUCOSE BLD-MCNC: 40 MG/DL (ref 70–99)
GLUCOSE BLD-MCNC: 85 MG/DL (ref 70–99)
GLUCOSE SERPL-MCNC: 67 MG/DL (ref 70–99)
GLUCOSE UR STRIP.AUTO-MCNC: NEGATIVE MG/DL
HCO3 BLDV-SCNC: 25.4 MMOL/L (ref 23–29)
HCT VFR BLD AUTO: 20 % (ref 36–48)
HCT VFR BLD AUTO: 27.8 % (ref 36–48)
HEMOCCULT SP1 STL QL: ABNORMAL
HGB BLD-MCNC: 6.2 G/DL (ref 12–16)
HGB BLD-MCNC: 8.8 G/DL (ref 12–16)
HGB UR QL STRIP.AUTO: ABNORMAL
KETONES UR STRIP.AUTO-MCNC: NEGATIVE MG/DL
LACTATE BLDV-SCNC: 1.3 MMOL/L (ref 0.4–1.9)
LEUKOCYTE ESTERASE UR QL STRIP.AUTO: NEGATIVE
LIPASE SERPL-CCNC: 5 U/L (ref 13–60)
LYMPHOCYTES # BLD: 0.8 K/UL (ref 1–5.1)
LYMPHOCYTES NFR BLD: 15.4 %
MCH RBC QN AUTO: 28.8 PG (ref 26–34)
MCHC RBC AUTO-ENTMCNC: 30.9 G/DL (ref 31–36)
MCV RBC AUTO: 93.1 FL (ref 80–100)
METHGB MFR BLDV: 0.6 %
MONOCYTES # BLD: 0.3 K/UL (ref 0–1.3)
MONOCYTES NFR BLD: 6.5 %
NEUTROPHILS # BLD: 3.9 K/UL (ref 1.7–7.7)
NEUTROPHILS NFR BLD: 76.9 %
NITRITE UR QL STRIP.AUTO: NEGATIVE
NT-PROBNP SERPL-MCNC: 378 PG/ML (ref 0–124)
O2 THERAPY: ABNORMAL
PCO2 BLDV: 42.5 MMHG (ref 40–50)
PERFORMED ON: ABNORMAL
PERFORMED ON: NORMAL
PH BLDV: 7.39 [PH] (ref 7.35–7.45)
PH UR STRIP.AUTO: 6 [PH] (ref 5–8)
PLATELET # BLD AUTO: 140 K/UL (ref 135–450)
PMV BLD AUTO: 9.4 FL (ref 5–10.5)
PO2 BLDV: 85.3 MMHG (ref 25–40)
POTASSIUM SERPL-SCNC: 5.5 MMOL/L (ref 3.5–5.1)
PROT SERPL-MCNC: 7 G/DL (ref 6.4–8.2)
PROT UR STRIP.AUTO-MCNC: 30 MG/DL
RBC # BLD AUTO: 2.15 M/UL (ref 4–5.2)
RBC #/AREA URNS HPF: NORMAL /HPF (ref 0–4)
SALICYLATES SERPL-MCNC: <0.3 MG/DL (ref 15–30)
SAO2 % BLDV: 96 %
SODIUM SERPL-SCNC: 139 MMOL/L (ref 136–145)
SP GR UR STRIP.AUTO: 1.01 (ref 1–1.03)
TROPONIN, HIGH SENSITIVITY: 62 NG/L (ref 0–14)
TROPONIN, HIGH SENSITIVITY: 67 NG/L (ref 0–14)
UA COMPLETE W REFLEX CULTURE PNL UR: ABNORMAL
UA DIPSTICK W REFLEX MICRO PNL UR: YES
URN SPEC COLLECT METH UR: ABNORMAL
UROBILINOGEN UR STRIP-ACNC: 0.2 E.U./DL
WBC # BLD AUTO: 5 K/UL (ref 4–11)
WBC #/AREA URNS HPF: NORMAL /HPF (ref 0–5)

## 2023-12-26 PROCEDURE — 83880 ASSAY OF NATRIURETIC PEPTIDE: CPT

## 2023-12-26 PROCEDURE — 80053 COMPREHEN METABOLIC PANEL: CPT

## 2023-12-26 PROCEDURE — 6370000000 HC RX 637 (ALT 250 FOR IP): Performed by: NURSE PRACTITIONER

## 2023-12-26 PROCEDURE — 30233N1 TRANSFUSION OF NONAUTOLOGOUS RED BLOOD CELLS INTO PERIPHERAL VEIN, PERCUTANEOUS APPROACH: ICD-10-PCS | Performed by: INTERNAL MEDICINE

## 2023-12-26 PROCEDURE — 86923 COMPATIBILITY TEST ELECTRIC: CPT

## 2023-12-26 PROCEDURE — 96374 THER/PROPH/DIAG INJ IV PUSH: CPT

## 2023-12-26 PROCEDURE — 82270 OCCULT BLOOD FECES: CPT

## 2023-12-26 PROCEDURE — 83605 ASSAY OF LACTIC ACID: CPT

## 2023-12-26 PROCEDURE — 83690 ASSAY OF LIPASE: CPT

## 2023-12-26 PROCEDURE — 85025 COMPLETE CBC W/AUTO DIFF WBC: CPT

## 2023-12-26 PROCEDURE — 85014 HEMATOCRIT: CPT

## 2023-12-26 PROCEDURE — 86850 RBC ANTIBODY SCREEN: CPT

## 2023-12-26 PROCEDURE — 86901 BLOOD TYPING SEROLOGIC RH(D): CPT

## 2023-12-26 PROCEDURE — 71045 X-RAY EXAM CHEST 1 VIEW: CPT

## 2023-12-26 PROCEDURE — 99285 EMERGENCY DEPT VISIT HI MDM: CPT

## 2023-12-26 PROCEDURE — 82077 ASSAY SPEC XCP UR&BREATH IA: CPT

## 2023-12-26 PROCEDURE — 36430 TRANSFUSION BLD/BLD COMPNT: CPT

## 2023-12-26 PROCEDURE — 93005 ELECTROCARDIOGRAM TRACING: CPT | Performed by: STUDENT IN AN ORGANIZED HEALTH CARE EDUCATION/TRAINING PROGRAM

## 2023-12-26 PROCEDURE — 70450 CT HEAD/BRAIN W/O DYE: CPT

## 2023-12-26 PROCEDURE — 80143 DRUG ASSAY ACETAMINOPHEN: CPT

## 2023-12-26 PROCEDURE — 84484 ASSAY OF TROPONIN QUANT: CPT

## 2023-12-26 PROCEDURE — 2580000003 HC RX 258: Performed by: NURSE PRACTITIONER

## 2023-12-26 PROCEDURE — 82746 ASSAY OF FOLIC ACID SERUM: CPT

## 2023-12-26 PROCEDURE — 81001 URINALYSIS AUTO W/SCOPE: CPT

## 2023-12-26 PROCEDURE — 85018 HEMOGLOBIN: CPT

## 2023-12-26 PROCEDURE — 82607 VITAMIN B-12: CPT

## 2023-12-26 PROCEDURE — P9016 RBC LEUKOCYTES REDUCED: HCPCS

## 2023-12-26 PROCEDURE — 2060000000 HC ICU INTERMEDIATE R&B

## 2023-12-26 PROCEDURE — 82803 BLOOD GASES ANY COMBINATION: CPT

## 2023-12-26 PROCEDURE — 36415 COLL VENOUS BLD VENIPUNCTURE: CPT

## 2023-12-26 PROCEDURE — 51702 INSERT TEMP BLADDER CATH: CPT

## 2023-12-26 PROCEDURE — 6360000002 HC RX W HCPCS: Performed by: STUDENT IN AN ORGANIZED HEALTH CARE EDUCATION/TRAINING PROGRAM

## 2023-12-26 PROCEDURE — 2500000003 HC RX 250 WO HCPCS: Performed by: STUDENT IN AN ORGANIZED HEALTH CARE EDUCATION/TRAINING PROGRAM

## 2023-12-26 PROCEDURE — 80179 DRUG ASSAY SALICYLATE: CPT

## 2023-12-26 PROCEDURE — 93010 ELECTROCARDIOGRAM REPORT: CPT | Performed by: INTERNAL MEDICINE

## 2023-12-26 PROCEDURE — 96375 TX/PRO/DX INJ NEW DRUG ADDON: CPT

## 2023-12-26 PROCEDURE — 86900 BLOOD TYPING SEROLOGIC ABO: CPT

## 2023-12-26 PROCEDURE — 2580000003 HC RX 258: Performed by: STUDENT IN AN ORGANIZED HEALTH CARE EDUCATION/TRAINING PROGRAM

## 2023-12-26 RX ORDER — ACETAMINOPHEN 325 MG/1
650 TABLET ORAL EVERY 6 HOURS PRN
Status: DISCONTINUED | OUTPATIENT
Start: 2023-12-26 | End: 2024-01-03 | Stop reason: HOSPADM

## 2023-12-26 RX ORDER — NALOXONE HYDROCHLORIDE 1 MG/ML
2 INJECTION INTRAMUSCULAR; INTRAVENOUS; SUBCUTANEOUS ONCE
Status: COMPLETED | OUTPATIENT
Start: 2023-12-26 | End: 2023-12-26

## 2023-12-26 RX ORDER — DEXTROSE MONOHYDRATE 100 MG/ML
INJECTION, SOLUTION INTRAVENOUS CONTINUOUS PRN
Status: DISCONTINUED | OUTPATIENT
Start: 2023-12-26 | End: 2024-01-03 | Stop reason: HOSPADM

## 2023-12-26 RX ORDER — POLYETHYLENE GLYCOL 3350 17 G/17G
17 POWDER, FOR SOLUTION ORAL DAILY PRN
Status: DISCONTINUED | OUTPATIENT
Start: 2023-12-26 | End: 2024-01-03 | Stop reason: HOSPADM

## 2023-12-26 RX ORDER — MECOBALAMIN 5000 MCG
5 TABLET,DISINTEGRATING ORAL NIGHTLY
Status: DISCONTINUED | OUTPATIENT
Start: 2023-12-26 | End: 2024-01-03 | Stop reason: HOSPADM

## 2023-12-26 RX ORDER — SODIUM CHLORIDE 9 MG/ML
INJECTION, SOLUTION INTRAVENOUS PRN
Status: COMPLETED | OUTPATIENT
Start: 2023-12-26 | End: 2023-12-26

## 2023-12-26 RX ORDER — DEXTROSE MONOHYDRATE 25 G/50ML
25 INJECTION, SOLUTION INTRAVENOUS ONCE
Status: COMPLETED | OUTPATIENT
Start: 2023-12-26 | End: 2023-12-26

## 2023-12-26 RX ORDER — ACETAMINOPHEN 650 MG/1
650 SUPPOSITORY RECTAL EVERY 6 HOURS PRN
Status: DISCONTINUED | OUTPATIENT
Start: 2023-12-26 | End: 2024-01-03 | Stop reason: HOSPADM

## 2023-12-26 RX ORDER — ONDANSETRON 4 MG/1
4 TABLET, ORALLY DISINTEGRATING ORAL EVERY 8 HOURS PRN
Status: DISCONTINUED | OUTPATIENT
Start: 2023-12-26 | End: 2023-12-30

## 2023-12-26 RX ORDER — GLUCAGON 1 MG/ML
1 KIT INJECTION PRN
Status: DISCONTINUED | OUTPATIENT
Start: 2023-12-26 | End: 2024-01-03 | Stop reason: HOSPADM

## 2023-12-26 RX ORDER — ONDANSETRON 2 MG/ML
4 INJECTION INTRAMUSCULAR; INTRAVENOUS EVERY 6 HOURS PRN
Status: DISCONTINUED | OUTPATIENT
Start: 2023-12-26 | End: 2023-12-30

## 2023-12-26 RX ORDER — SODIUM CHLORIDE 0.9 % (FLUSH) 0.9 %
5-40 SYRINGE (ML) INJECTION EVERY 12 HOURS SCHEDULED
Status: DISCONTINUED | OUTPATIENT
Start: 2023-12-26 | End: 2024-01-03 | Stop reason: HOSPADM

## 2023-12-26 RX ORDER — SODIUM CHLORIDE 9 MG/ML
INJECTION, SOLUTION INTRAVENOUS PRN
Status: DISCONTINUED | OUTPATIENT
Start: 2023-12-26 | End: 2024-01-03 | Stop reason: HOSPADM

## 2023-12-26 RX ORDER — SODIUM CHLORIDE 0.9 % (FLUSH) 0.9 %
5-40 SYRINGE (ML) INJECTION PRN
Status: DISCONTINUED | OUTPATIENT
Start: 2023-12-26 | End: 2024-01-03 | Stop reason: HOSPADM

## 2023-12-26 RX ADMIN — SODIUM CHLORIDE: 9 INJECTION, SOLUTION INTRAVENOUS at 12:07

## 2023-12-26 RX ADMIN — DEXTROSE MONOHYDRATE 25 G: 25 INJECTION, SOLUTION INTRAVENOUS at 07:54

## 2023-12-26 RX ADMIN — DEXTROSE MONOHYDRATE 250 ML: 100 INJECTION, SOLUTION INTRAVENOUS at 14:41

## 2023-12-26 RX ADMIN — ACETAMINOPHEN 650 MG: 325 TABLET ORAL at 20:16

## 2023-12-26 RX ADMIN — Medication 10 ML: at 20:20

## 2023-12-26 RX ADMIN — Medication 5 MG: at 22:25

## 2023-12-26 RX ADMIN — NALOXONE HYDROCHLORIDE 2 MG: 1 INJECTION PARENTERAL at 06:46

## 2023-12-26 ASSESSMENT — PAIN - FUNCTIONAL ASSESSMENT
PAIN_FUNCTIONAL_ASSESSMENT: NONE - DENIES PAIN

## 2023-12-26 ASSESSMENT — PAIN SCALES - GENERAL: PAINLEVEL_OUTOF10: 9

## 2023-12-26 NOTE — H&P
Hospital Medicine History & Physical      Date of Admission: 12/26/2023    Date of Service:  Pt seen/examined on 12/26/23     [x]Admitted to Inpatient with expected LOS greater than two midnights due to medical therapy.  []Placed in Observation status.    Chief Admission Complaint:  unresponsive    Presenting Admission History:      63 y.o. female with a PMH of colon cancer s/p right hemicolectomy, CAD, PVD, stage IIIa CKD, diabetes mellitus type 2, hypertension hyperlipidemia, chronic diarrhea, and bipolar disorder who presented to [facility] due to being unresponsive. Patient was found unresponsive by staff at [facility], FSBS was low. EMS was called. She was given dextrose for glucose of 21 and Narcan 4mg.     In ED, H&H 6.2/20, VBG with no significant acidosis or alkalosis  CMP with normal sodium, mildly elevated potassium at 5.5-hemolyzed, baseline renal function with a BUN of 19, creatinine of 1.8 and GFR of 31, elevated alk phos to 223, elevated AST to 49 with a normal ALT and bilirubin, Lipase was normal  Ethanol not detected, Negative acetaminophen and salicylate. 1 unit PRBC's transfused.     Assessment/Plan:      Current Principal Problem:  Hypoglycemia    Acute metabolic encephalopathy due to hypoglycemia, Diabetes type II:   A1C noted to be 8.8 on 12/11/23.  Hold home regimen (Amaryl, Actos). Hypoglycemic protocol. Check UA, CXR: Cardiomegaly, blunting of the right costophrenic angle which could be due to a small pleural effusion. Trop 72-90-rbqwon due to CKD, ECG NSR. Hold gabapentin. Check head CT.    Essential hypertension.  Continue amlodipine, isosorbide, prazosin.    Acute on chronic anemia 2/2 CKD 3B- 1 unit transfused in ED for hgb  6.2, previous 8.4.  check FOBT.     CAD, Chronic diastolic heart failure.  Likely due to hypertensive heart disease.  No evidence of exacerbation at this time.  Echo this admission: EF 55-60%; no regional WMAs; normal LV filling pressure.  HF order set

## 2023-12-26 NOTE — ED NOTES
313 @ 5729  
527 @ 6703  
Attempted call to Maurice (Patient's Son) for verbal consent for blood products over the phone with no success. Left message for him to call back at his earliest convenience.   
Bare hugger therapy initiated d/t pt being hypothermic.   
Blood consent witnessed by 2 nurses and placed in pt rounder.   
Called lab regarding 1 unit PRBCs. Blood bank preparing blood now. Plan of care continues.   
D/t loss of IV access blood transfusion was paused at 1032 and restarted at 1059. Plan of care continues.   
Dooley emptied. 825mL output.   
Family of pt called. Updated on pt status and plan of care. No further questions at this time.  
Patient requesting something to eat, upon checking blood sugar before meal, patient was 34 and 32mg/dL. Patient was started on 250mL bolus of D10%. Patient responding and is coherent. Patient fed meal tray at the bedside, she ate all of the meatloaf, and all of her mashed potatoes and has drank 8oz of apple juice. Blood sugar rechecked and 151mg/dL. Patient alert and oriented at the end of eating and transport here to take her up.     
Perfect serve sent to hospitalist to get patient upgraded to PCU d/t hypothermia and bear hugger requirement. Plan of care continues.   
Pt starting to become more alert. Pt eyes open, groaning but no audible words. Pt placed in fall precautions. Plan of care continues.   
Report called, Transport placed.  
Vitals charted at 15 min jonel for blood transfusion. Pt tolerating blood transfusion well. VSS. Transfusion rate increased to 200mL/HR. MD updated on patient status.   
ultrasound CT scan 2023 December 2022 HISTORY: ORDERING SYSTEM PROVIDED HISTORY: elevated LFTs, hx double ductal sign, abdominal pain TECHNOLOGIST PROVIDED HISTORY: Pt GFR is too low for contrast. Reason for exam:->elevated LFTs, hx double ductal sign, abdominal pain What is the sedation requirement?-> Release to patient - Note: Delayed release will only apply to this order. Orders that are changed or resulted outside of the EHR will not respect a delayed release to [facility].-> Reason for Exam: elevated LFTs, hx double ductal sign, abdominal pain FINDINGS: Motion artifact degrades the study.  This decreases sensitivity and specificity of the exam Trace pleural fluid is seen. Gallbladder: Gallbladder is contracted.  No obvious gallstones noted. Bile Ducts: There is mild biliary ductal dilatation seen.  Extrahepatic common duct measures 9 mm..  No definite distal common duct stone.  Dilated ducts appears similar in caliber compared to 2022 Pancreatic Duct: Pancreatic duct is dilated, measuring up to 5 mm..  Caliber appears similar compared to 2022. Other:  Right kidney is absent.  Left adrenal gland unremarkable.  No hydronephrosis on the left.  Colon is diffusely distended with a large amount of stool.  No significant small bowel distention Spurring is seen in the spine     Limited by motion.  Mild biliary ductal dilatation persists, similar compared to 2022.  No definite retained common duct stone Persistent pancreatic ductal dilatation, also similar compared to 2022.. There is known chronic calcific pancreatitis Large stool load in colon     NM Cardiac Stress Test Nuclear Imaging    Result Date: 11/28/2023  Cardiac Perfusion Imaging  Demographics   Patient Name       KEV PRADO   Date of Study      11/28/2023         Gender              Female   Patient Number     [id number]         Date of Birth       1960   Visit Number       [id number]          Age                 63 year(s)   Accession Number

## 2023-12-26 NOTE — DISCHARGE INSTR - COC
Continuity of Care Form    Patient Name: Agustina Fried   :  1960  MRN:  [id number]    Admit date:  2023  Discharge date:  1/3/2023    Code Status Order: Prior   Advance Directives:     Admitting Physician:  No admitting provider for patient encounter.  PCP: Viridiana Blanco, APRN - NP    Discharging Nurse: PONCHO Medley  Discharging Hospital Unit/Room#:   Discharging Unit Phone Number: ***    Emergency Contact:   Extended Emergency Contact Information  Primary Emergency Contact: Maurice Fried   [facility]  Home Phone: [phone]  Relation: Child  Secondary Emergency Contact: Dawna Jon   [place]  Mobile Phone: [phone]  Relation: Other    Past Surgical History:  Past Surgical History:   Procedure Laterality Date    BREAST LUMPECTOMY      Bilateral:breast cancer    CARDIAC CATHETERIZATION  2020    Dr. Marcano([facility]), DAYANA to Diag 1    COLONOSCOPY N/A 2021    COLONOSCOPY DIAGNOSTIC performed by Silviano Pro MD at [facility] ENDOSCOPY    COLONOSCOPY N/A 2023    COLONOSCOPY WITH BIOPSY performed by Silviano Pro MD at [facility]    CT BONE MARROW BIOPSY  2/3/2021    CT BONE MARROW BIOPSY 2/3/2021 Jamila Cooney MD [facility] CT SCAN    HEMICOLECTOMY N/A 3/7/2023    ROBOTIC CONVERTED TO OPEN RIGHT COLECTOMY performed by Jeffrey Quintero MD at [facility] OR    HYSTERECTOMY (CERVIX STATUS UNKNOWN)      Benign:no cervical cancer per pt'    KIDNEY REMOVAL      right    OTHER SURGICAL HISTORY Right     orif right ankle    TEMPORAL ARTERY BIOPSY Right 2021    RIGHT TEMPORAL ARTERY BIOPSY LIGATION performed by Byron Landry MD at [facility] OR    TUBAL LIGATION      UPPER GASTROINTESTINAL ENDOSCOPY N/A 2021    EGD BIOPSY performed by Silviano Pro MD at [facility] ENDOSCOPY    UPPER GASTROINTESTINAL ENDOSCOPY N/A 12/15/2022    EGD BIOPSY performed by Silviano Pro MD at [facility] ENDOSCOPY       Immunization History:   Immunization History   Administered

## 2023-12-26 NOTE — ED PROVIDER NOTES
MD   pantoprazole (PROTONIX) 40 MG tablet Take 1 tablet by mouth daily 4/3/21   Imani Brady MD   calcium carbonate-vitamin D (CALTRATE) 600-400 MG-UNIT TABS per tab Take 1 tablet by mouth daily 12/30/19   Imani Brady MD   aspirin 81 MG tablet Take 1 tablet by mouth daily    ProviderImani MD       Social history:  reports that she quit smoking about 9 years ago. Her smoking use included cigarettes and cigars. She started smoking about 29 years ago. She has a 10.0 pack-year smoking history. She has never used smokeless tobacco. She reports that she does not drink alcohol and does not use drugs.    Family history:    Family History   Problem Relation Age of Onset    Cancer Mother         breast    Cancer Father     Heart Failure Neg Hx     High Cholesterol Neg Hx     Hypertension Neg Hx     Migraines Neg Hx     Rashes/Skin Problems Neg Hx     Seizures Neg Hx     Stroke Neg Hx     Thyroid Disease Neg Hx     Diabetes Neg Hx        Exam  ED Triage Vitals   BP Temp Temp src Pulse Resp SpO2 Height Weight   -- -- -- -- -- -- -- --       Physical Exam  Vitals and nursing note reviewed.   Constitutional:       General: She is not in acute distress.  HENT:      Head: Normocephalic and atraumatic.      Mouth/Throat:      Pharynx: Oropharynx is clear.   Eyes:      Pupils: Pupils are equal, round, and reactive to light.   Cardiovascular:      Rate and Rhythm: Normal rate and regular rhythm.   Pulmonary:      Effort: Pulmonary effort is normal.      Breath sounds: No wheezing or rhonchi.   Abdominal:      General: There is no distension.      Palpations: Abdomen is soft.      Tenderness: There is no abdominal tenderness.   Musculoskeletal:      Cervical back: Neck supple.   Skin:     General: Skin is warm.      Capillary Refill: Capillary refill takes less than 2 seconds.   Neurological:      Mental Status: She is lethargic.      Cranial Nerves: No facial asymmetry.      Comments: Ranging her extremities

## 2023-12-27 LAB
ANION GAP SERPL CALCULATED.3IONS-SCNC: 6 MMOL/L (ref 3–16)
BASOPHILS # BLD: 0 K/UL (ref 0–0.2)
BASOPHILS NFR BLD: 0.5 %
BUN SERPL-MCNC: 22 MG/DL (ref 7–20)
CALCIUM SERPL-MCNC: 8.2 MG/DL (ref 8.3–10.6)
CHLORIDE SERPL-SCNC: 108 MMOL/L (ref 99–110)
CO2 SERPL-SCNC: 25 MMOL/L (ref 21–32)
CREAT SERPL-MCNC: 1.3 MG/DL (ref 0.6–1.2)
DEPRECATED RDW RBC AUTO: 17.3 % (ref 12.4–15.4)
EOSINOPHIL # BLD: 0.1 K/UL (ref 0–0.6)
EOSINOPHIL NFR BLD: 1.1 %
GFR SERPLBLD CREATININE-BSD FMLA CKD-EPI: 46 ML/MIN/{1.73_M2}
GLUCOSE BLD-MCNC: 112 MG/DL (ref 70–99)
GLUCOSE BLD-MCNC: 200 MG/DL (ref 70–99)
GLUCOSE BLD-MCNC: 229 MG/DL (ref 70–99)
GLUCOSE BLD-MCNC: 233 MG/DL (ref 70–99)
GLUCOSE BLD-MCNC: 241 MG/DL (ref 70–99)
GLUCOSE BLD-MCNC: 264 MG/DL (ref 70–99)
GLUCOSE SERPL-MCNC: 143 MG/DL (ref 70–99)
HCT VFR BLD AUTO: 26.9 % (ref 36–48)
HGB BLD-MCNC: 8.3 G/DL (ref 12–16)
IRON SATN MFR SERPL: 20 % (ref 15–50)
IRON SERPL-MCNC: 58 UG/DL (ref 37–145)
LYMPHOCYTES # BLD: 1.3 K/UL (ref 1–5.1)
LYMPHOCYTES NFR BLD: 22.4 %
MCH RBC QN AUTO: 27.8 PG (ref 26–34)
MCHC RBC AUTO-ENTMCNC: 31 G/DL (ref 31–36)
MCV RBC AUTO: 89.9 FL (ref 80–100)
MONOCYTES # BLD: 0.3 K/UL (ref 0–1.3)
MONOCYTES NFR BLD: 5.7 %
NEUTROPHILS # BLD: 3.9 K/UL (ref 1.7–7.7)
NEUTROPHILS NFR BLD: 70.3 %
PERFORMED ON: ABNORMAL
PLATELET # BLD AUTO: 141 K/UL (ref 135–450)
PMV BLD AUTO: 9.3 FL (ref 5–10.5)
POTASSIUM SERPL-SCNC: 5.2 MMOL/L (ref 3.5–5.1)
RBC # BLD AUTO: 2.99 M/UL (ref 4–5.2)
SODIUM SERPL-SCNC: 139 MMOL/L (ref 136–145)
TIBC SERPL-MCNC: 297 UG/DL (ref 260–445)
WBC # BLD AUTO: 5.6 K/UL (ref 4–11)

## 2023-12-27 PROCEDURE — 80048 BASIC METABOLIC PNL TOTAL CA: CPT

## 2023-12-27 PROCEDURE — 83550 IRON BINDING TEST: CPT

## 2023-12-27 PROCEDURE — 2060000000 HC ICU INTERMEDIATE R&B

## 2023-12-27 PROCEDURE — 97116 GAIT TRAINING THERAPY: CPT

## 2023-12-27 PROCEDURE — 6370000000 HC RX 637 (ALT 250 FOR IP): Performed by: NURSE PRACTITIONER

## 2023-12-27 PROCEDURE — 85025 COMPLETE CBC W/AUTO DIFF WBC: CPT

## 2023-12-27 PROCEDURE — 36415 COLL VENOUS BLD VENIPUNCTURE: CPT

## 2023-12-27 PROCEDURE — 97166 OT EVAL MOD COMPLEX 45 MIN: CPT

## 2023-12-27 PROCEDURE — 97530 THERAPEUTIC ACTIVITIES: CPT

## 2023-12-27 PROCEDURE — 97162 PT EVAL MOD COMPLEX 30 MIN: CPT

## 2023-12-27 PROCEDURE — 83540 ASSAY OF IRON: CPT

## 2023-12-27 PROCEDURE — 2580000003 HC RX 258: Performed by: NURSE PRACTITIONER

## 2023-12-27 PROCEDURE — 87324 CLOSTRIDIUM AG IA: CPT

## 2023-12-27 PROCEDURE — 87449 NOS EACH ORGANISM AG IA: CPT

## 2023-12-27 RX ORDER — OXYCODONE HYDROCHLORIDE 5 MG/1
5 TABLET ORAL EVERY 6 HOURS PRN
Status: ON HOLD | COMMUNITY
End: 2024-01-03 | Stop reason: HOSPADM

## 2023-12-27 RX ORDER — PANTOPRAZOLE SODIUM 40 MG/1
40 TABLET, DELAYED RELEASE ORAL DAILY
Status: DISCONTINUED | OUTPATIENT
Start: 2023-12-27 | End: 2024-01-03 | Stop reason: HOSPADM

## 2023-12-27 RX ORDER — GABAPENTIN 100 MG/1
200 CAPSULE ORAL 3 TIMES DAILY
COMMUNITY

## 2023-12-27 RX ORDER — PALIPERIDONE 3 MG/1
9 TABLET, EXTENDED RELEASE ORAL EVERY MORNING
Status: DISCONTINUED | OUTPATIENT
Start: 2023-12-27 | End: 2024-01-03 | Stop reason: HOSPADM

## 2023-12-27 RX ORDER — AMLODIPINE BESYLATE 5 MG/1
10 TABLET ORAL DAILY
Status: DISCONTINUED | OUTPATIENT
Start: 2023-12-27 | End: 2024-01-03 | Stop reason: HOSPADM

## 2023-12-27 RX ORDER — ATORVASTATIN CALCIUM 40 MG/1
40 TABLET, FILM COATED ORAL NIGHTLY
Status: DISCONTINUED | OUTPATIENT
Start: 2023-12-27 | End: 2024-01-03 | Stop reason: HOSPADM

## 2023-12-27 RX ORDER — GABAPENTIN 100 MG/1
200 CAPSULE ORAL 3 TIMES DAILY
Status: DISCONTINUED | OUTPATIENT
Start: 2023-12-27 | End: 2024-01-03 | Stop reason: HOSPADM

## 2023-12-27 RX ADMIN — Medication 10 ML: at 20:02

## 2023-12-27 RX ADMIN — AMLODIPINE BESYLATE 10 MG: 5 TABLET ORAL at 10:06

## 2023-12-27 RX ADMIN — GABAPENTIN 200 MG: 100 CAPSULE ORAL at 20:02

## 2023-12-27 RX ADMIN — GABAPENTIN 200 MG: 100 CAPSULE ORAL at 14:29

## 2023-12-27 RX ADMIN — ATORVASTATIN CALCIUM 40 MG: 40 TABLET, FILM COATED ORAL at 20:02

## 2023-12-27 RX ADMIN — PANTOPRAZOLE SODIUM 40 MG: 40 TABLET, DELAYED RELEASE ORAL at 10:06

## 2023-12-27 RX ADMIN — PALIPERIDONE 9 MG: 3 TABLET, EXTENDED RELEASE ORAL at 14:29

## 2023-12-27 RX ADMIN — Medication 10 ML: at 10:06

## 2023-12-27 RX ADMIN — Medication 5 MG: at 20:02

## 2023-12-27 RX ADMIN — ACETAMINOPHEN 650 MG: 325 TABLET ORAL at 10:05

## 2023-12-27 ASSESSMENT — PAIN DESCRIPTION - PAIN TYPE: TYPE: ACUTE PAIN

## 2023-12-27 ASSESSMENT — PAIN SCALES - GENERAL
PAINLEVEL_OUTOF10: 8

## 2023-12-27 ASSESSMENT — PAIN DESCRIPTION - ORIENTATION
ORIENTATION: RIGHT;LEFT
ORIENTATION: RIGHT;LEFT

## 2023-12-27 ASSESSMENT — PAIN DESCRIPTION - LOCATION
LOCATION: OTHER (COMMENT)
LOCATION: OTHER (COMMENT)
LOCATION: FLANK

## 2023-12-27 ASSESSMENT — PAIN DESCRIPTION - DESCRIPTORS
DESCRIPTORS: SORE
DESCRIPTORS: SORE

## 2023-12-27 NOTE — ACP (ADVANCE CARE PLANNING)
Advance Care Planning     General Advance Care Planning (ACP) Conversation    Date of Conversation: 12/26/2023  Conducted with: Patient with Decision Making Capacity    Healthcare Decision Maker:    Primary Decision Maker: Maurice Fried - Child - [phone]    Secondary Decision Maker: Moi Fried - Brother/Sister - [phone]    Secondary Decision Maker: Marlyn Fried - Daughter-in-Law - [phone]    Supplemental (Other) Decision Maker: PEMA DIANE - Other - [phone]  Click here to complete Healthcare Decision Makers including selection of the Healthcare Decision Maker Relationship (ie \"Primary\").   Today we documented Decision Maker(s) consistent with Legal Next of Kin hierarchy.    Content/Action Overview:  Has ACP document(s) on file - reflects the patient's care preferences  Reviewed DNR/DNI and patient elects Full Code (Attempt Resuscitation)        Length of Voluntary ACP Conversation in minutes:  <16 minutes (Non-Billable)    Benita Mandujano RN

## 2023-12-27 NOTE — CONSULTS
CONSULTATION  Agustina Fried is a 63 y.o. female asked to see us in consultation by  Viridiana Blanco APRN - NP & Arslan Oden MD for evaluation of rectal bleeding.    Ms. Fried is a 62 year old female with past medical history of DM, CKD, HTN, Wilm's tumor s/p right nephrectomy, breast cancer 2014 s/p radiation and lumpectomy, CAD on Brilinta/ASA, depression, anxiety, chronic anemia and invasive adenocarcinoma of the right colon s/p rt colectomy 3/2023 and C. Diff 11/2023.  She brought to [facility] from local SNF with AMS. She was given dextrose as her BS was 21, and narcan.    She was anemic with Hgb of 6.2 down from 8.4 on admission. Following improvement in her mental status, she reports a bloody bowel movement. This occurred several days ago.  We have been consulted to evaluate.    She denies abdominal pain.  She has not followed up with [facility] (has missed several appointments), nor us.  She was admitted earlier this month and seen by our service for chronically elevated LFTs, and at that time isolated elevated alk phos. MRI/MRCP showed double ductal sign and chronic pancreatic calcifications.  M2Ab normal, RHONDA negative, GGT elevated, alk phos isoenzymes consistent with hepatic source.           Medications Prior to Admission: oxyCODONE (ROXICODONE) 5 MG immediate release tablet, Take 1 tablet by mouth every 6 hours as needed for Pain. Max Daily Amount: 20 mg  gabapentin (NEURONTIN) 100 MG capsule, Take 2 capsules by mouth 3 times daily.  sodium bicarbonate 650 MG tablet, Take 1 tablet by mouth 2 times daily  bisacodyl (DULCOLAX) 5 MG EC tablet, Take 1 tablet by mouth daily as needed for Constipation  atorvastatin (LIPITOR) 40 MG tablet, Take 1 tablet by mouth nightly  isosorbide mononitrate (IMDUR) 30 MG extended release tablet, Take 1 tablet by mouth daily (Patient not taking: Reported on 12/27/2023)  Blood Glucose

## 2023-12-28 LAB
ANION GAP SERPL CALCULATED.3IONS-SCNC: 6 MMOL/L (ref 3–16)
BASOPHILS # BLD: 0 K/UL (ref 0–0.2)
BASOPHILS NFR BLD: 0.4 %
BUN SERPL-MCNC: 21 MG/DL (ref 7–20)
C DIFF TOX A+B STL QL IA: ABNORMAL
CALCIUM SERPL-MCNC: 8.4 MG/DL (ref 8.3–10.6)
CHLORIDE SERPL-SCNC: 106 MMOL/L (ref 99–110)
CO2 SERPL-SCNC: 23 MMOL/L (ref 21–32)
CREAT SERPL-MCNC: 1.4 MG/DL (ref 0.6–1.2)
DEPRECATED RDW RBC AUTO: 17.1 % (ref 12.4–15.4)
EOSINOPHIL # BLD: 0 K/UL (ref 0–0.6)
EOSINOPHIL NFR BLD: 0.6 %
FOLATE SERPL-MCNC: 16.18 NG/ML (ref 4.78–24.2)
GFR SERPLBLD CREATININE-BSD FMLA CKD-EPI: 42 ML/MIN/{1.73_M2}
GLUCOSE BLD-MCNC: 191 MG/DL (ref 70–99)
GLUCOSE BLD-MCNC: 336 MG/DL (ref 70–99)
GLUCOSE BLD-MCNC: 380 MG/DL (ref 70–99)
GLUCOSE BLD-MCNC: 383 MG/DL (ref 70–99)
GLUCOSE BLD-MCNC: 425 MG/DL (ref 70–99)
GLUCOSE SERPL-MCNC: 310 MG/DL (ref 70–99)
HCT VFR BLD AUTO: 28.2 % (ref 36–48)
HGB BLD-MCNC: 8.7 G/DL (ref 12–16)
LYMPHOCYTES # BLD: 1.3 K/UL (ref 1–5.1)
LYMPHOCYTES NFR BLD: 22.9 %
MCH RBC QN AUTO: 28.2 PG (ref 26–34)
MCHC RBC AUTO-ENTMCNC: 31 G/DL (ref 31–36)
MCV RBC AUTO: 91.1 FL (ref 80–100)
MONOCYTES # BLD: 0.4 K/UL (ref 0–1.3)
MONOCYTES NFR BLD: 7.1 %
NEUTROPHILS # BLD: 3.9 K/UL (ref 1.7–7.7)
NEUTROPHILS NFR BLD: 69 %
PERFORMED ON: ABNORMAL
PLATELET # BLD AUTO: 143 K/UL (ref 135–450)
PMV BLD AUTO: 9.5 FL (ref 5–10.5)
POTASSIUM SERPL-SCNC: 4.6 MMOL/L (ref 3.5–5.1)
RBC # BLD AUTO: 3.09 M/UL (ref 4–5.2)
SODIUM SERPL-SCNC: 135 MMOL/L (ref 136–145)
VIT B12 SERPL-MCNC: 726 PG/ML (ref 211–911)
WBC # BLD AUTO: 5.7 K/UL (ref 4–11)

## 2023-12-28 PROCEDURE — 2580000003 HC RX 258: Performed by: NURSE PRACTITIONER

## 2023-12-28 PROCEDURE — 80048 BASIC METABOLIC PNL TOTAL CA: CPT

## 2023-12-28 PROCEDURE — 36415 COLL VENOUS BLD VENIPUNCTURE: CPT

## 2023-12-28 PROCEDURE — 85025 COMPLETE CBC W/AUTO DIFF WBC: CPT

## 2023-12-28 PROCEDURE — 6370000000 HC RX 637 (ALT 250 FOR IP): Performed by: NURSE PRACTITIONER

## 2023-12-28 PROCEDURE — 2060000000 HC ICU INTERMEDIATE R&B

## 2023-12-28 PROCEDURE — 2500000003 HC RX 250 WO HCPCS: Performed by: NURSE PRACTITIONER

## 2023-12-28 PROCEDURE — 6360000002 HC RX W HCPCS: Performed by: NURSE PRACTITIONER

## 2023-12-28 RX ORDER — VANCOMYCIN HYDROCHLORIDE 50 MG/ML
125 KIT ORAL EVERY 6 HOURS SCHEDULED
Status: DISCONTINUED | OUTPATIENT
Start: 2023-12-28 | End: 2023-12-28

## 2023-12-28 RX ORDER — INSULIN LISPRO 100 [IU]/ML
0-4 INJECTION, SOLUTION INTRAVENOUS; SUBCUTANEOUS EVERY 4 HOURS
Status: DISCONTINUED | OUTPATIENT
Start: 2023-12-28 | End: 2023-12-29

## 2023-12-28 RX ORDER — VANCOMYCIN HYDROCHLORIDE 50 MG/ML
250 KIT ORAL EVERY 6 HOURS SCHEDULED
Status: DISCONTINUED | OUTPATIENT
Start: 2023-12-28 | End: 2024-01-03 | Stop reason: HOSPADM

## 2023-12-28 RX ORDER — LABETALOL HYDROCHLORIDE 5 MG/ML
10 INJECTION, SOLUTION INTRAVENOUS EVERY 4 HOURS PRN
Status: DISCONTINUED | OUTPATIENT
Start: 2023-12-28 | End: 2024-01-03 | Stop reason: HOSPADM

## 2023-12-28 RX ADMIN — GABAPENTIN 200 MG: 100 CAPSULE ORAL at 19:54

## 2023-12-28 RX ADMIN — PANTOPRAZOLE SODIUM 40 MG: 40 TABLET, DELAYED RELEASE ORAL at 09:46

## 2023-12-28 RX ADMIN — Medication 5 MG: at 19:54

## 2023-12-28 RX ADMIN — LABETALOL HYDROCHLORIDE 10 MG: 5 INJECTION INTRAVENOUS at 20:05

## 2023-12-28 RX ADMIN — AMLODIPINE BESYLATE 10 MG: 5 TABLET ORAL at 09:46

## 2023-12-28 RX ADMIN — PALIPERIDONE 9 MG: 3 TABLET, EXTENDED RELEASE ORAL at 09:47

## 2023-12-28 RX ADMIN — GABAPENTIN 200 MG: 100 CAPSULE ORAL at 15:09

## 2023-12-28 RX ADMIN — Medication 10 ML: at 09:46

## 2023-12-28 RX ADMIN — LABETALOL HYDROCHLORIDE 5 MG: 5 INJECTION INTRAVENOUS at 04:26

## 2023-12-28 RX ADMIN — ATORVASTATIN CALCIUM 40 MG: 40 TABLET, FILM COATED ORAL at 19:54

## 2023-12-28 RX ADMIN — GABAPENTIN 200 MG: 100 CAPSULE ORAL at 09:46

## 2023-12-28 RX ADMIN — Medication 10 ML: at 19:55

## 2023-12-28 RX ADMIN — Medication 250 MG: at 19:27

## 2023-12-28 RX ADMIN — INSULIN LISPRO 4 UNITS: 100 INJECTION, SOLUTION INTRAVENOUS; SUBCUTANEOUS at 23:38

## 2023-12-28 ASSESSMENT — PAIN DESCRIPTION - PAIN TYPE: TYPE: CHRONIC PAIN

## 2023-12-28 ASSESSMENT — PAIN DESCRIPTION - LOCATION: LOCATION: BACK

## 2023-12-28 ASSESSMENT — PAIN SCALES - GENERAL
PAINLEVEL_OUTOF10: 0
PAINLEVEL_OUTOF10: 8

## 2023-12-29 LAB
ANION GAP SERPL CALCULATED.3IONS-SCNC: 8 MMOL/L (ref 3–16)
BASOPHILS # BLD: 0 K/UL (ref 0–0.2)
BASOPHILS NFR BLD: 0.6 %
BUN SERPL-MCNC: 19 MG/DL (ref 7–20)
CALCIUM SERPL-MCNC: 8.5 MG/DL (ref 8.3–10.6)
CHLORIDE SERPL-SCNC: 105 MMOL/L (ref 99–110)
CO2 SERPL-SCNC: 24 MMOL/L (ref 21–32)
CREAT SERPL-MCNC: 1.5 MG/DL (ref 0.6–1.2)
DEPRECATED RDW RBC AUTO: 16.6 % (ref 12.4–15.4)
EOSINOPHIL # BLD: 0.1 K/UL (ref 0–0.6)
EOSINOPHIL NFR BLD: 1 %
GFR SERPLBLD CREATININE-BSD FMLA CKD-EPI: 38 ML/MIN/{1.73_M2}
GLUCOSE BLD-MCNC: 241 MG/DL (ref 70–99)
GLUCOSE BLD-MCNC: 266 MG/DL (ref 70–99)
GLUCOSE BLD-MCNC: 266 MG/DL (ref 70–99)
GLUCOSE BLD-MCNC: 276 MG/DL (ref 70–99)
GLUCOSE BLD-MCNC: 282 MG/DL (ref 70–99)
GLUCOSE BLD-MCNC: 300 MG/DL (ref 70–99)
GLUCOSE SERPL-MCNC: 211 MG/DL (ref 70–99)
HCT VFR BLD AUTO: 26.7 % (ref 36–48)
HGB BLD-MCNC: 8.5 G/DL (ref 12–16)
LYMPHOCYTES # BLD: 1.8 K/UL (ref 1–5.1)
LYMPHOCYTES NFR BLD: 25.6 %
MCH RBC QN AUTO: 28.2 PG (ref 26–34)
MCHC RBC AUTO-ENTMCNC: 31.6 G/DL (ref 31–36)
MCV RBC AUTO: 89.2 FL (ref 80–100)
MONOCYTES # BLD: 0.6 K/UL (ref 0–1.3)
MONOCYTES NFR BLD: 8.3 %
NEUTROPHILS # BLD: 4.5 K/UL (ref 1.7–7.7)
NEUTROPHILS NFR BLD: 64.5 %
PERFORMED ON: ABNORMAL
PLATELET # BLD AUTO: 156 K/UL (ref 135–450)
PMV BLD AUTO: 9.7 FL (ref 5–10.5)
POTASSIUM SERPL-SCNC: 4.5 MMOL/L (ref 3.5–5.1)
RBC # BLD AUTO: 3 M/UL (ref 4–5.2)
SODIUM SERPL-SCNC: 137 MMOL/L (ref 136–145)
WBC # BLD AUTO: 7 K/UL (ref 4–11)

## 2023-12-29 PROCEDURE — 85025 COMPLETE CBC W/AUTO DIFF WBC: CPT

## 2023-12-29 PROCEDURE — 2580000003 HC RX 258: Performed by: NURSE PRACTITIONER

## 2023-12-29 PROCEDURE — 36415 COLL VENOUS BLD VENIPUNCTURE: CPT

## 2023-12-29 PROCEDURE — 6370000000 HC RX 637 (ALT 250 FOR IP): Performed by: NURSE PRACTITIONER

## 2023-12-29 PROCEDURE — 80048 BASIC METABOLIC PNL TOTAL CA: CPT

## 2023-12-29 PROCEDURE — 97535 SELF CARE MNGMENT TRAINING: CPT

## 2023-12-29 PROCEDURE — 2060000000 HC ICU INTERMEDIATE R&B

## 2023-12-29 PROCEDURE — 83036 HEMOGLOBIN GLYCOSYLATED A1C: CPT

## 2023-12-29 PROCEDURE — 97110 THERAPEUTIC EXERCISES: CPT

## 2023-12-29 PROCEDURE — 2500000003 HC RX 250 WO HCPCS: Performed by: NURSE PRACTITIONER

## 2023-12-29 PROCEDURE — 97116 GAIT TRAINING THERAPY: CPT

## 2023-12-29 PROCEDURE — 6360000002 HC RX W HCPCS: Performed by: NURSE PRACTITIONER

## 2023-12-29 RX ORDER — INSULIN LISPRO 100 [IU]/ML
0-4 INJECTION, SOLUTION INTRAVENOUS; SUBCUTANEOUS
Status: DISCONTINUED | OUTPATIENT
Start: 2023-12-29 | End: 2024-01-03 | Stop reason: HOSPADM

## 2023-12-29 RX ORDER — OXYCODONE HYDROCHLORIDE AND ACETAMINOPHEN 5; 325 MG/1; MG/1
1 TABLET ORAL EVERY 6 HOURS PRN
Status: DISCONTINUED | OUTPATIENT
Start: 2023-12-29 | End: 2024-01-03 | Stop reason: HOSPADM

## 2023-12-29 RX ORDER — INSULIN LISPRO 100 [IU]/ML
0-4 INJECTION, SOLUTION INTRAVENOUS; SUBCUTANEOUS NIGHTLY
Status: DISCONTINUED | OUTPATIENT
Start: 2023-12-29 | End: 2024-01-03 | Stop reason: HOSPADM

## 2023-12-29 RX ORDER — INSULIN LISPRO 100 [IU]/ML
0.05 INJECTION, SOLUTION INTRAVENOUS; SUBCUTANEOUS
Status: DISCONTINUED | OUTPATIENT
Start: 2023-12-29 | End: 2023-12-30

## 2023-12-29 RX ADMIN — AMLODIPINE BESYLATE 10 MG: 5 TABLET ORAL at 08:32

## 2023-12-29 RX ADMIN — GABAPENTIN 200 MG: 100 CAPSULE ORAL at 15:47

## 2023-12-29 RX ADMIN — INSULIN LISPRO 4 UNITS: 100 INJECTION, SOLUTION INTRAVENOUS; SUBCUTANEOUS at 21:16

## 2023-12-29 RX ADMIN — PANTOPRAZOLE SODIUM 40 MG: 40 TABLET, DELAYED RELEASE ORAL at 08:32

## 2023-12-29 RX ADMIN — GABAPENTIN 200 MG: 100 CAPSULE ORAL at 08:32

## 2023-12-29 RX ADMIN — ACETAMINOPHEN 650 MG: 325 TABLET ORAL at 05:55

## 2023-12-29 RX ADMIN — Medication 250 MG: at 05:36

## 2023-12-29 RX ADMIN — INSULIN LISPRO 1 UNITS: 100 INJECTION, SOLUTION INTRAVENOUS; SUBCUTANEOUS at 12:10

## 2023-12-29 RX ADMIN — INSULIN LISPRO 2 UNITS: 100 INJECTION, SOLUTION INTRAVENOUS; SUBCUTANEOUS at 04:06

## 2023-12-29 RX ADMIN — INSULIN LISPRO 3 UNITS: 100 INJECTION, SOLUTION INTRAVENOUS; SUBCUTANEOUS at 17:16

## 2023-12-29 RX ADMIN — LABETALOL HYDROCHLORIDE 10 MG: 5 INJECTION INTRAVENOUS at 21:17

## 2023-12-29 RX ADMIN — Medication 250 MG: at 12:15

## 2023-12-29 RX ADMIN — PALIPERIDONE 9 MG: 3 TABLET, EXTENDED RELEASE ORAL at 10:38

## 2023-12-29 RX ADMIN — Medication 250 MG: at 00:18

## 2023-12-29 RX ADMIN — INSULIN LISPRO 2 UNITS: 100 INJECTION, SOLUTION INTRAVENOUS; SUBCUTANEOUS at 17:15

## 2023-12-29 RX ADMIN — Medication 250 MG: at 17:15

## 2023-12-29 RX ADMIN — ATORVASTATIN CALCIUM 40 MG: 40 TABLET, FILM COATED ORAL at 21:16

## 2023-12-29 RX ADMIN — OXYCODONE AND ACETAMINOPHEN 1 TABLET: 5; 325 TABLET ORAL at 21:15

## 2023-12-29 RX ADMIN — INSULIN LISPRO 3 UNITS: 100 INJECTION, SOLUTION INTRAVENOUS; SUBCUTANEOUS at 12:10

## 2023-12-29 RX ADMIN — Medication 5 MG: at 21:15

## 2023-12-29 RX ADMIN — OXYCODONE AND ACETAMINOPHEN 1 TABLET: 5; 325 TABLET ORAL at 12:14

## 2023-12-29 RX ADMIN — Medication 10 ML: at 08:32

## 2023-12-29 RX ADMIN — INSULIN LISPRO 2 UNITS: 100 INJECTION, SOLUTION INTRAVENOUS; SUBCUTANEOUS at 08:32

## 2023-12-29 RX ADMIN — Medication 10 ML: at 21:16

## 2023-12-29 RX ADMIN — GABAPENTIN 200 MG: 100 CAPSULE ORAL at 21:15

## 2023-12-29 ASSESSMENT — PAIN - FUNCTIONAL ASSESSMENT: PAIN_FUNCTIONAL_ASSESSMENT: ACTIVITIES ARE NOT PREVENTED

## 2023-12-29 ASSESSMENT — PAIN SCALES - GENERAL
PAINLEVEL_OUTOF10: 7
PAINLEVEL_OUTOF10: 6
PAINLEVEL_OUTOF10: 4
PAINLEVEL_OUTOF10: 9
PAINLEVEL_OUTOF10: 0
PAINLEVEL_OUTOF10: 8

## 2023-12-29 ASSESSMENT — PAIN DESCRIPTION - LOCATION
LOCATION: HEAD
LOCATION: BACK
LOCATION: BACK

## 2023-12-29 ASSESSMENT — PAIN DESCRIPTION - ORIENTATION: ORIENTATION: LOWER

## 2023-12-29 ASSESSMENT — PAIN DESCRIPTION - DESCRIPTORS
DESCRIPTORS: ACHING
DESCRIPTORS: ACHING

## 2023-12-29 NOTE — SIGNIFICANT EVENT
BG > 400, here for encephalopathy 2/2 low BG, now hyperglycemic.  Will put POCT q4h with low-dose SSI q4h.

## 2023-12-30 LAB
ANION GAP SERPL CALCULATED.3IONS-SCNC: 7 MMOL/L (ref 3–16)
BASOPHILS # BLD: 0 K/UL (ref 0–0.2)
BASOPHILS NFR BLD: 0.7 %
BUN SERPL-MCNC: 20 MG/DL (ref 7–20)
CALCIUM SERPL-MCNC: 8.4 MG/DL (ref 8.3–10.6)
CHLORIDE SERPL-SCNC: 107 MMOL/L (ref 99–110)
CO2 SERPL-SCNC: 24 MMOL/L (ref 21–32)
CREAT SERPL-MCNC: 1.5 MG/DL (ref 0.6–1.2)
DEPRECATED RDW RBC AUTO: 16.9 % (ref 12.4–15.4)
EOSINOPHIL # BLD: 0.1 K/UL (ref 0–0.6)
EOSINOPHIL NFR BLD: 1.2 %
EST. AVERAGE GLUCOSE BLD GHB EST-MCNC: 119.8 MG/DL
GFR SERPLBLD CREATININE-BSD FMLA CKD-EPI: 38 ML/MIN/{1.73_M2}
GLUCOSE BLD-MCNC: 102 MG/DL (ref 70–99)
GLUCOSE BLD-MCNC: 139 MG/DL (ref 70–99)
GLUCOSE BLD-MCNC: 263 MG/DL (ref 70–99)
GLUCOSE BLD-MCNC: 456 MG/DL (ref 70–99)
GLUCOSE BLD-MCNC: 86 MG/DL (ref 70–99)
GLUCOSE SERPL-MCNC: 252 MG/DL (ref 70–99)
HBA1C MFR BLD: 5.8 %
HCT VFR BLD AUTO: 28.9 % (ref 36–48)
HGB BLD-MCNC: 8.9 G/DL (ref 12–16)
LYMPHOCYTES # BLD: 1.8 K/UL (ref 1–5.1)
LYMPHOCYTES NFR BLD: 29.3 %
MCH RBC QN AUTO: 28.1 PG (ref 26–34)
MCHC RBC AUTO-ENTMCNC: 30.8 G/DL (ref 31–36)
MCV RBC AUTO: 91.1 FL (ref 80–100)
MONOCYTES # BLD: 0.5 K/UL (ref 0–1.3)
MONOCYTES NFR BLD: 8.6 %
NEUTROPHILS # BLD: 3.6 K/UL (ref 1.7–7.7)
NEUTROPHILS NFR BLD: 60.2 %
PERFORMED ON: ABNORMAL
PERFORMED ON: NORMAL
PLATELET # BLD AUTO: 158 K/UL (ref 135–450)
PMV BLD AUTO: 9.1 FL (ref 5–10.5)
POTASSIUM SERPL-SCNC: 5.3 MMOL/L (ref 3.5–5.1)
RBC # BLD AUTO: 3.17 M/UL (ref 4–5.2)
SODIUM SERPL-SCNC: 138 MMOL/L (ref 136–145)
WBC # BLD AUTO: 6.1 K/UL (ref 4–11)

## 2023-12-30 PROCEDURE — 80048 BASIC METABOLIC PNL TOTAL CA: CPT

## 2023-12-30 PROCEDURE — 6370000000 HC RX 637 (ALT 250 FOR IP): Performed by: INTERNAL MEDICINE

## 2023-12-30 PROCEDURE — 2580000003 HC RX 258: Performed by: NURSE PRACTITIONER

## 2023-12-30 PROCEDURE — 6370000000 HC RX 637 (ALT 250 FOR IP): Performed by: NURSE PRACTITIONER

## 2023-12-30 PROCEDURE — 85025 COMPLETE CBC W/AUTO DIFF WBC: CPT

## 2023-12-30 PROCEDURE — 2500000003 HC RX 250 WO HCPCS: Performed by: NURSE PRACTITIONER

## 2023-12-30 PROCEDURE — 2060000000 HC ICU INTERMEDIATE R&B

## 2023-12-30 PROCEDURE — 36415 COLL VENOUS BLD VENIPUNCTURE: CPT

## 2023-12-30 RX ORDER — ASPIRIN 81 MG/1
81 TABLET ORAL DAILY
Status: DISCONTINUED | OUTPATIENT
Start: 2023-12-30 | End: 2024-01-03 | Stop reason: HOSPADM

## 2023-12-30 RX ORDER — INSULIN LISPRO 100 [IU]/ML
10 INJECTION, SOLUTION INTRAVENOUS; SUBCUTANEOUS ONCE
Status: COMPLETED | OUTPATIENT
Start: 2023-12-30 | End: 2023-12-30

## 2023-12-30 RX ORDER — TRAZODONE HYDROCHLORIDE 50 MG/1
200 TABLET ORAL NIGHTLY
Status: DISCONTINUED | OUTPATIENT
Start: 2023-12-30 | End: 2024-01-03 | Stop reason: HOSPADM

## 2023-12-30 RX ORDER — PRAZOSIN HYDROCHLORIDE 1 MG/1
4 CAPSULE ORAL 2 TIMES DAILY
Status: DISCONTINUED | OUTPATIENT
Start: 2023-12-30 | End: 2024-01-03 | Stop reason: HOSPADM

## 2023-12-30 RX ORDER — INSULIN GLARGINE 100 [IU]/ML
12 INJECTION, SOLUTION SUBCUTANEOUS ONCE
Status: COMPLETED | OUTPATIENT
Start: 2023-12-30 | End: 2023-12-30

## 2023-12-30 RX ORDER — INSULIN GLARGINE 100 [IU]/ML
12 INJECTION, SOLUTION SUBCUTANEOUS NIGHTLY
Status: DISCONTINUED | OUTPATIENT
Start: 2023-12-31 | End: 2024-01-01

## 2023-12-30 RX ORDER — CALCIUM CARBONATE-CHOLECALCIFEROL TAB 250 MG-125 UNIT 250-125 MG-UNIT
2 TAB ORAL DAILY
Status: DISCONTINUED | OUTPATIENT
Start: 2023-12-30 | End: 2024-01-03 | Stop reason: HOSPADM

## 2023-12-30 RX ADMIN — CALCIUM CARBONATE-CHOLECALCIFEROL TAB 250 MG-125 UNIT 2 TABLET: 250-125 TAB at 16:06

## 2023-12-30 RX ADMIN — Medication 250 MG: at 13:24

## 2023-12-30 RX ADMIN — INSULIN LISPRO 10 UNITS: 100 INJECTION, SOLUTION INTRAVENOUS; SUBCUTANEOUS at 13:24

## 2023-12-30 RX ADMIN — GABAPENTIN 200 MG: 100 CAPSULE ORAL at 20:46

## 2023-12-30 RX ADMIN — INSULIN LISPRO 2 UNITS: 100 INJECTION, SOLUTION INTRAVENOUS; SUBCUTANEOUS at 08:15

## 2023-12-30 RX ADMIN — PANTOPRAZOLE SODIUM 40 MG: 40 TABLET, DELAYED RELEASE ORAL at 09:27

## 2023-12-30 RX ADMIN — Medication 250 MG: at 00:55

## 2023-12-30 RX ADMIN — TRAZODONE HYDROCHLORIDE 200 MG: 50 TABLET ORAL at 20:46

## 2023-12-30 RX ADMIN — OXYCODONE AND ACETAMINOPHEN 1 TABLET: 5; 325 TABLET ORAL at 09:27

## 2023-12-30 RX ADMIN — PRAZOSIN HYDROCHLORIDE 4 MG: 1 CAPSULE ORAL at 20:46

## 2023-12-30 RX ADMIN — Medication 5 MG: at 20:46

## 2023-12-30 RX ADMIN — Medication 10 ML: at 20:49

## 2023-12-30 RX ADMIN — Medication 250 MG: at 23:39

## 2023-12-30 RX ADMIN — ATORVASTATIN CALCIUM 40 MG: 40 TABLET, FILM COATED ORAL at 20:47

## 2023-12-30 RX ADMIN — PALIPERIDONE 9 MG: 3 TABLET, EXTENDED RELEASE ORAL at 09:27

## 2023-12-30 RX ADMIN — INSULIN LISPRO 3 UNITS: 100 INJECTION, SOLUTION INTRAVENOUS; SUBCUTANEOUS at 08:15

## 2023-12-30 RX ADMIN — GABAPENTIN 200 MG: 100 CAPSULE ORAL at 09:27

## 2023-12-30 RX ADMIN — ASPIRIN 81 MG: 81 TABLET, COATED ORAL at 16:06

## 2023-12-30 RX ADMIN — OXYCODONE AND ACETAMINOPHEN 1 TABLET: 5; 325 TABLET ORAL at 20:45

## 2023-12-30 RX ADMIN — AMLODIPINE BESYLATE 10 MG: 5 TABLET ORAL at 09:27

## 2023-12-30 RX ADMIN — GABAPENTIN 200 MG: 100 CAPSULE ORAL at 15:15

## 2023-12-30 RX ADMIN — INSULIN GLARGINE 12 UNITS: 100 INJECTION, SOLUTION SUBCUTANEOUS at 16:06

## 2023-12-30 RX ADMIN — Medication 250 MG: at 17:32

## 2023-12-30 ASSESSMENT — PAIN DESCRIPTION - LOCATION
LOCATION: BACK

## 2023-12-30 ASSESSMENT — PAIN SCALES - GENERAL
PAINLEVEL_OUTOF10: 4
PAINLEVEL_OUTOF10: 6
PAINLEVEL_OUTOF10: 8
PAINLEVEL_OUTOF10: 4
PAINLEVEL_OUTOF10: 3
PAINLEVEL_OUTOF10: 4
PAINLEVEL_OUTOF10: 3

## 2023-12-30 ASSESSMENT — PAIN DESCRIPTION - ORIENTATION
ORIENTATION: LOWER
ORIENTATION: LOWER

## 2023-12-31 LAB
ANION GAP SERPL CALCULATED.3IONS-SCNC: 7 MMOL/L (ref 3–16)
BASOPHILS # BLD: 0 K/UL (ref 0–0.2)
BASOPHILS NFR BLD: 0.6 %
BUN SERPL-MCNC: 17 MG/DL (ref 7–20)
CALCIUM SERPL-MCNC: 8.2 MG/DL (ref 8.3–10.6)
CHLORIDE SERPL-SCNC: 104 MMOL/L (ref 99–110)
CO2 SERPL-SCNC: 22 MMOL/L (ref 21–32)
CREAT SERPL-MCNC: 1.7 MG/DL (ref 0.6–1.2)
DEPRECATED RDW RBC AUTO: 16.8 % (ref 12.4–15.4)
EOSINOPHIL # BLD: 0.1 K/UL (ref 0–0.6)
EOSINOPHIL NFR BLD: 1.6 %
GFR SERPLBLD CREATININE-BSD FMLA CKD-EPI: 33 ML/MIN/{1.73_M2}
GLUCOSE BLD-MCNC: 137 MG/DL (ref 70–99)
GLUCOSE BLD-MCNC: 142 MG/DL (ref 70–99)
GLUCOSE BLD-MCNC: 174 MG/DL (ref 70–99)
GLUCOSE BLD-MCNC: 295 MG/DL (ref 70–99)
GLUCOSE BLD-MCNC: 333 MG/DL (ref 70–99)
GLUCOSE SERPL-MCNC: 390 MG/DL (ref 70–99)
HCT VFR BLD AUTO: 28.3 % (ref 36–48)
HGB BLD-MCNC: 8.7 G/DL (ref 12–16)
LYMPHOCYTES # BLD: 1.4 K/UL (ref 1–5.1)
LYMPHOCYTES NFR BLD: 29.8 %
MCH RBC QN AUTO: 27.9 PG (ref 26–34)
MCHC RBC AUTO-ENTMCNC: 30.7 G/DL (ref 31–36)
MCV RBC AUTO: 90.8 FL (ref 80–100)
MONOCYTES # BLD: 0.3 K/UL (ref 0–1.3)
MONOCYTES NFR BLD: 6.8 %
NEUTROPHILS # BLD: 2.8 K/UL (ref 1.7–7.7)
NEUTROPHILS NFR BLD: 61.2 %
PERFORMED ON: ABNORMAL
PLATELET # BLD AUTO: 150 K/UL (ref 135–450)
PMV BLD AUTO: 9.4 FL (ref 5–10.5)
POTASSIUM SERPL-SCNC: 5.9 MMOL/L (ref 3.5–5.1)
RBC # BLD AUTO: 3.12 M/UL (ref 4–5.2)
SODIUM SERPL-SCNC: 133 MMOL/L (ref 136–145)
WBC # BLD AUTO: 4.6 K/UL (ref 4–11)

## 2023-12-31 PROCEDURE — 36415 COLL VENOUS BLD VENIPUNCTURE: CPT

## 2023-12-31 PROCEDURE — 6370000000 HC RX 637 (ALT 250 FOR IP): Performed by: NURSE PRACTITIONER

## 2023-12-31 PROCEDURE — 1200000000 HC SEMI PRIVATE

## 2023-12-31 PROCEDURE — 85025 COMPLETE CBC W/AUTO DIFF WBC: CPT

## 2023-12-31 PROCEDURE — 6370000000 HC RX 637 (ALT 250 FOR IP): Performed by: INTERNAL MEDICINE

## 2023-12-31 PROCEDURE — 2500000003 HC RX 250 WO HCPCS: Performed by: NURSE PRACTITIONER

## 2023-12-31 PROCEDURE — 80048 BASIC METABOLIC PNL TOTAL CA: CPT

## 2023-12-31 PROCEDURE — 2580000003 HC RX 258: Performed by: NURSE PRACTITIONER

## 2023-12-31 RX ADMIN — TRAZODONE HYDROCHLORIDE 200 MG: 50 TABLET ORAL at 22:17

## 2023-12-31 RX ADMIN — ATORVASTATIN CALCIUM 40 MG: 40 TABLET, FILM COATED ORAL at 22:17

## 2023-12-31 RX ADMIN — CALCIUM CARBONATE-CHOLECALCIFEROL TAB 250 MG-125 UNIT 2 TABLET: 250-125 TAB at 10:50

## 2023-12-31 RX ADMIN — ASPIRIN 81 MG: 81 TABLET, COATED ORAL at 10:46

## 2023-12-31 RX ADMIN — OXYCODONE AND ACETAMINOPHEN 1 TABLET: 5; 325 TABLET ORAL at 22:28

## 2023-12-31 RX ADMIN — PALIPERIDONE 9 MG: 3 TABLET, EXTENDED RELEASE ORAL at 10:51

## 2023-12-31 RX ADMIN — Medication 250 MG: at 05:59

## 2023-12-31 RX ADMIN — Medication 10 ML: at 22:18

## 2023-12-31 RX ADMIN — PRAZOSIN HYDROCHLORIDE 4 MG: 1 CAPSULE ORAL at 10:50

## 2023-12-31 RX ADMIN — PANTOPRAZOLE SODIUM 40 MG: 40 TABLET, DELAYED RELEASE ORAL at 10:50

## 2023-12-31 RX ADMIN — OXYCODONE AND ACETAMINOPHEN 1 TABLET: 5; 325 TABLET ORAL at 13:30

## 2023-12-31 RX ADMIN — GABAPENTIN 200 MG: 100 CAPSULE ORAL at 10:46

## 2023-12-31 RX ADMIN — GABAPENTIN 200 MG: 100 CAPSULE ORAL at 22:17

## 2023-12-31 RX ADMIN — Medication 250 MG: at 17:34

## 2023-12-31 RX ADMIN — INSULIN LISPRO 3 UNITS: 100 INJECTION, SOLUTION INTRAVENOUS; SUBCUTANEOUS at 17:34

## 2023-12-31 RX ADMIN — AMLODIPINE BESYLATE 10 MG: 5 TABLET ORAL at 10:46

## 2023-12-31 RX ADMIN — GABAPENTIN 200 MG: 100 CAPSULE ORAL at 14:27

## 2023-12-31 RX ADMIN — PRAZOSIN HYDROCHLORIDE 4 MG: 1 CAPSULE ORAL at 22:17

## 2023-12-31 RX ADMIN — Medication 5 MG: at 22:17

## 2023-12-31 RX ADMIN — Medication 250 MG: at 13:30

## 2023-12-31 RX ADMIN — INSULIN GLARGINE 12 UNITS: 100 INJECTION, SOLUTION SUBCUTANEOUS at 22:17

## 2023-12-31 ASSESSMENT — PAIN SCALES - GENERAL
PAINLEVEL_OUTOF10: 7
PAINLEVEL_OUTOF10: 3
PAINLEVEL_OUTOF10: 8
PAINLEVEL_OUTOF10: 0
PAINLEVEL_OUTOF10: 0
PAINLEVEL_OUTOF10: 7
PAINLEVEL_OUTOF10: 2

## 2023-12-31 ASSESSMENT — PAIN DESCRIPTION - LOCATION
LOCATION: BACK
LOCATION: BACK

## 2023-12-31 ASSESSMENT — PAIN DESCRIPTION - ORIENTATION: ORIENTATION: LOWER

## 2024-01-01 LAB
GLUCOSE BLD-MCNC: 263 MG/DL (ref 70–99)
GLUCOSE BLD-MCNC: 296 MG/DL (ref 70–99)
GLUCOSE BLD-MCNC: 317 MG/DL (ref 70–99)
GLUCOSE BLD-MCNC: 60 MG/DL (ref 70–99)
GLUCOSE BLD-MCNC: 92 MG/DL (ref 70–99)
PERFORMED ON: ABNORMAL
PERFORMED ON: NORMAL

## 2024-01-01 PROCEDURE — 6370000000 HC RX 637 (ALT 250 FOR IP): Performed by: NURSE PRACTITIONER

## 2024-01-01 PROCEDURE — 1200000000 HC SEMI PRIVATE

## 2024-01-01 PROCEDURE — 2580000003 HC RX 258: Performed by: NURSE PRACTITIONER

## 2024-01-01 PROCEDURE — 2500000003 HC RX 250 WO HCPCS: Performed by: NURSE PRACTITIONER

## 2024-01-01 PROCEDURE — 6370000000 HC RX 637 (ALT 250 FOR IP): Performed by: INTERNAL MEDICINE

## 2024-01-01 PROCEDURE — 97110 THERAPEUTIC EXERCISES: CPT

## 2024-01-01 RX ORDER — INSULIN GLARGINE 100 [IU]/ML
10 INJECTION, SOLUTION SUBCUTANEOUS NIGHTLY
Status: DISCONTINUED | OUTPATIENT
Start: 2024-01-01 | End: 2024-01-02

## 2024-01-01 RX ADMIN — PALIPERIDONE 9 MG: 3 TABLET, EXTENDED RELEASE ORAL at 09:04

## 2024-01-01 RX ADMIN — GABAPENTIN 200 MG: 100 CAPSULE ORAL at 13:53

## 2024-01-01 RX ADMIN — PANTOPRAZOLE SODIUM 40 MG: 40 TABLET, DELAYED RELEASE ORAL at 09:04

## 2024-01-01 RX ADMIN — ATORVASTATIN CALCIUM 40 MG: 40 TABLET, FILM COATED ORAL at 21:09

## 2024-01-01 RX ADMIN — Medication 250 MG: at 13:53

## 2024-01-01 RX ADMIN — GABAPENTIN 200 MG: 100 CAPSULE ORAL at 21:09

## 2024-01-01 RX ADMIN — Medication 250 MG: at 17:56

## 2024-01-01 RX ADMIN — Medication 5 MG: at 21:09

## 2024-01-01 RX ADMIN — INSULIN GLARGINE 10 UNITS: 100 INJECTION, SOLUTION SUBCUTANEOUS at 21:09

## 2024-01-01 RX ADMIN — Medication 250 MG: at 00:17

## 2024-01-01 RX ADMIN — INSULIN LISPRO 4 UNITS: 100 INJECTION, SOLUTION INTRAVENOUS; SUBCUTANEOUS at 21:10

## 2024-01-01 RX ADMIN — SODIUM ZIRCONIUM CYCLOSILICATE 5 G: 5 POWDER, FOR SUSPENSION ORAL at 17:52

## 2024-01-01 RX ADMIN — PRAZOSIN HYDROCHLORIDE 4 MG: 1 CAPSULE ORAL at 21:09

## 2024-01-01 RX ADMIN — GABAPENTIN 200 MG: 100 CAPSULE ORAL at 09:04

## 2024-01-01 RX ADMIN — Medication 10 ML: at 21:10

## 2024-01-01 RX ADMIN — INSULIN LISPRO 2 UNITS: 100 INJECTION, SOLUTION INTRAVENOUS; SUBCUTANEOUS at 09:04

## 2024-01-01 RX ADMIN — ASPIRIN 81 MG: 81 TABLET, COATED ORAL at 09:04

## 2024-01-01 RX ADMIN — INSULIN LISPRO 2 UNITS: 100 INJECTION, SOLUTION INTRAVENOUS; SUBCUTANEOUS at 17:52

## 2024-01-01 RX ADMIN — TRAZODONE HYDROCHLORIDE 200 MG: 50 TABLET ORAL at 21:09

## 2024-01-01 RX ADMIN — OXYCODONE AND ACETAMINOPHEN 1 TABLET: 5; 325 TABLET ORAL at 21:08

## 2024-01-01 RX ADMIN — SODIUM ZIRCONIUM CYCLOSILICATE 5 G: 5 POWDER, FOR SUSPENSION ORAL at 21:16

## 2024-01-01 RX ADMIN — Medication 250 MG: at 05:57

## 2024-01-01 RX ADMIN — CALCIUM CARBONATE-CHOLECALCIFEROL TAB 250 MG-125 UNIT 2 TABLET: 250-125 TAB at 09:08

## 2024-01-01 RX ADMIN — AMLODIPINE BESYLATE 10 MG: 5 TABLET ORAL at 09:04

## 2024-01-01 ASSESSMENT — PAIN DESCRIPTION - LOCATION: LOCATION: BACK

## 2024-01-01 ASSESSMENT — PAIN DESCRIPTION - PAIN TYPE: TYPE: CHRONIC PAIN

## 2024-01-01 ASSESSMENT — PAIN DESCRIPTION - ORIENTATION: ORIENTATION: LOWER

## 2024-01-01 ASSESSMENT — PAIN SCALES - GENERAL: PAINLEVEL_OUTOF10: 7

## 2024-01-01 ASSESSMENT — PAIN DESCRIPTION - DESCRIPTORS: DESCRIPTORS: ACHING

## 2024-01-01 ASSESSMENT — PAIN - FUNCTIONAL ASSESSMENT: PAIN_FUNCTIONAL_ASSESSMENT: PREVENTS OR INTERFERES SOME ACTIVE ACTIVITIES AND ADLS

## 2024-01-02 LAB
ANION GAP SERPL CALCULATED.3IONS-SCNC: 5 MMOL/L (ref 3–16)
BUN SERPL-MCNC: 17 MG/DL (ref 7–20)
CALCIUM SERPL-MCNC: 8.1 MG/DL (ref 8.3–10.6)
CHLORIDE SERPL-SCNC: 103 MMOL/L (ref 99–110)
CO2 SERPL-SCNC: 27 MMOL/L (ref 21–32)
CREAT SERPL-MCNC: 1.9 MG/DL (ref 0.6–1.2)
GFR SERPLBLD CREATININE-BSD FMLA CKD-EPI: 29 ML/MIN/{1.73_M2}
GLUCOSE BLD-MCNC: 193 MG/DL (ref 70–99)
GLUCOSE BLD-MCNC: 204 MG/DL (ref 70–99)
GLUCOSE BLD-MCNC: 305 MG/DL (ref 70–99)
GLUCOSE BLD-MCNC: 346 MG/DL (ref 70–99)
GLUCOSE SERPL-MCNC: 364 MG/DL (ref 70–99)
PERFORMED ON: ABNORMAL
POTASSIUM SERPL-SCNC: 5.4 MMOL/L (ref 3.5–5.1)
SODIUM SERPL-SCNC: 135 MMOL/L (ref 136–145)

## 2024-01-02 PROCEDURE — 6370000000 HC RX 637 (ALT 250 FOR IP): Performed by: NURSE PRACTITIONER

## 2024-01-02 PROCEDURE — 6370000000 HC RX 637 (ALT 250 FOR IP): Performed by: INTERNAL MEDICINE

## 2024-01-02 PROCEDURE — 2500000003 HC RX 250 WO HCPCS: Performed by: NURSE PRACTITIONER

## 2024-01-02 PROCEDURE — 2580000003 HC RX 258: Performed by: INTERNAL MEDICINE

## 2024-01-02 PROCEDURE — 80048 BASIC METABOLIC PNL TOTAL CA: CPT

## 2024-01-02 PROCEDURE — 36415 COLL VENOUS BLD VENIPUNCTURE: CPT

## 2024-01-02 PROCEDURE — 1200000000 HC SEMI PRIVATE

## 2024-01-02 PROCEDURE — 2580000003 HC RX 258: Performed by: NURSE PRACTITIONER

## 2024-01-02 RX ORDER — 0.9 % SODIUM CHLORIDE 0.9 %
1000 INTRAVENOUS SOLUTION INTRAVENOUS ONCE
Status: COMPLETED | OUTPATIENT
Start: 2024-01-02 | End: 2024-01-02

## 2024-01-02 RX ORDER — INSULIN GLARGINE 100 [IU]/ML
15 INJECTION, SOLUTION SUBCUTANEOUS NIGHTLY
Status: DISCONTINUED | OUTPATIENT
Start: 2024-01-02 | End: 2024-01-03 | Stop reason: HOSPADM

## 2024-01-02 RX ADMIN — GABAPENTIN 200 MG: 100 CAPSULE ORAL at 14:45

## 2024-01-02 RX ADMIN — PANTOPRAZOLE SODIUM 40 MG: 40 TABLET, DELAYED RELEASE ORAL at 09:23

## 2024-01-02 RX ADMIN — GABAPENTIN 200 MG: 100 CAPSULE ORAL at 09:23

## 2024-01-02 RX ADMIN — Medication 10 ML: at 21:15

## 2024-01-02 RX ADMIN — Medication 250 MG: at 00:01

## 2024-01-02 RX ADMIN — Medication 5 MG: at 21:14

## 2024-01-02 RX ADMIN — ATORVASTATIN CALCIUM 40 MG: 40 TABLET, FILM COATED ORAL at 21:14

## 2024-01-02 RX ADMIN — Medication 10 ML: at 09:24

## 2024-01-02 RX ADMIN — PALIPERIDONE 9 MG: 3 TABLET, EXTENDED RELEASE ORAL at 09:34

## 2024-01-02 RX ADMIN — AMLODIPINE BESYLATE 10 MG: 5 TABLET ORAL at 09:23

## 2024-01-02 RX ADMIN — OXYCODONE AND ACETAMINOPHEN 1 TABLET: 5; 325 TABLET ORAL at 14:45

## 2024-01-02 RX ADMIN — TRAZODONE HYDROCHLORIDE 200 MG: 50 TABLET ORAL at 21:14

## 2024-01-02 RX ADMIN — CALCIUM CARBONATE-CHOLECALCIFEROL TAB 250 MG-125 UNIT 2 TABLET: 250-125 TAB at 09:25

## 2024-01-02 RX ADMIN — Medication 250 MG: at 17:41

## 2024-01-02 RX ADMIN — Medication 250 MG: at 05:04

## 2024-01-02 RX ADMIN — SODIUM CHLORIDE 1000 ML: 9 INJECTION, SOLUTION INTRAVENOUS at 09:37

## 2024-01-02 RX ADMIN — Medication 250 MG: at 12:32

## 2024-01-02 RX ADMIN — PRAZOSIN HYDROCHLORIDE 4 MG: 1 CAPSULE ORAL at 21:14

## 2024-01-02 RX ADMIN — ASPIRIN 81 MG: 81 TABLET, COATED ORAL at 09:23

## 2024-01-02 RX ADMIN — INSULIN LISPRO 3 UNITS: 100 INJECTION, SOLUTION INTRAVENOUS; SUBCUTANEOUS at 09:24

## 2024-01-02 RX ADMIN — PRAZOSIN HYDROCHLORIDE 4 MG: 1 CAPSULE ORAL at 09:23

## 2024-01-02 RX ADMIN — INSULIN LISPRO 1 UNITS: 100 INJECTION, SOLUTION INTRAVENOUS; SUBCUTANEOUS at 17:40

## 2024-01-02 RX ADMIN — INSULIN LISPRO 3 UNITS: 100 INJECTION, SOLUTION INTRAVENOUS; SUBCUTANEOUS at 12:32

## 2024-01-02 RX ADMIN — INSULIN GLARGINE 15 UNITS: 100 INJECTION, SOLUTION SUBCUTANEOUS at 21:18

## 2024-01-02 RX ADMIN — SODIUM ZIRCONIUM CYCLOSILICATE 5 G: 5 POWDER, FOR SUSPENSION ORAL at 21:15

## 2024-01-02 RX ADMIN — SODIUM ZIRCONIUM CYCLOSILICATE 5 G: 5 POWDER, FOR SUSPENSION ORAL at 14:45

## 2024-01-02 RX ADMIN — SODIUM ZIRCONIUM CYCLOSILICATE 5 G: 5 POWDER, FOR SUSPENSION ORAL at 10:09

## 2024-01-02 RX ADMIN — GABAPENTIN 200 MG: 100 CAPSULE ORAL at 21:14

## 2024-01-02 ASSESSMENT — PAIN SCALES - GENERAL
PAINLEVEL_OUTOF10: 0

## 2024-01-02 NOTE — FLOWSHEET NOTE
01/01/24 2107   Vital Signs   Temp 98.4 °F (36.9 °C)   Temp Source Oral   Pulse 63   Heart Rate Source Monitor   Respirations 16   BP (!) 161/66   MAP (Calculated) 98   BP Location Right upper arm   BP Method Automatic   Patient Position High fowlers   Pain Assessment   Pain Assessment 0-10   Pain Level 7   Pain Location Back   Pain Orientation Lower   Pain Descriptors Aching   Functional Pain Assessment Prevents or interferes some active activities and ADLs   Pain Type Chronic pain   Oxygen Therapy   SpO2 93 %   O2 Device None (Room air)     PM assessment complete. Medications given as ordered. Vital signs stable. Plan of care reviewed. Pt assisted to bathroom and back, 1 episode of BM, loose stool. New gown and depend supplied. Bed alarm set. Drink provided, pt ordered door dash of chinese food. Fsbs 317, scheduled night time insulin given. No other needs identified at this time.

## 2024-01-03 VITALS
SYSTOLIC BLOOD PRESSURE: 160 MMHG | HEIGHT: 63 IN | WEIGHT: 138.23 LBS | BODY MASS INDEX: 24.49 KG/M2 | DIASTOLIC BLOOD PRESSURE: 64 MMHG | OXYGEN SATURATION: 92 % | HEART RATE: 60 BPM | RESPIRATION RATE: 16 BRPM | TEMPERATURE: 98.4 F

## 2024-01-03 LAB
ANION GAP SERPL CALCULATED.3IONS-SCNC: 5 MMOL/L (ref 3–16)
BUN SERPL-MCNC: 17 MG/DL (ref 7–20)
CALCIUM SERPL-MCNC: 8.5 MG/DL (ref 8.3–10.6)
CHLORIDE SERPL-SCNC: 105 MMOL/L (ref 99–110)
CO2 SERPL-SCNC: 28 MMOL/L (ref 21–32)
CREAT SERPL-MCNC: 1.5 MG/DL (ref 0.6–1.2)
GFR SERPLBLD CREATININE-BSD FMLA CKD-EPI: 38 ML/MIN/{1.73_M2}
GLUCOSE BLD-MCNC: 153 MG/DL (ref 70–99)
GLUCOSE BLD-MCNC: 287 MG/DL (ref 70–99)
GLUCOSE SERPL-MCNC: 161 MG/DL (ref 70–99)
PERFORMED ON: ABNORMAL
PERFORMED ON: ABNORMAL
POTASSIUM SERPL-SCNC: 5 MMOL/L (ref 3.5–5.1)
SODIUM SERPL-SCNC: 138 MMOL/L (ref 136–145)

## 2024-01-03 PROCEDURE — 6370000000 HC RX 637 (ALT 250 FOR IP): Performed by: NURSE PRACTITIONER

## 2024-01-03 PROCEDURE — 80048 BASIC METABOLIC PNL TOTAL CA: CPT

## 2024-01-03 PROCEDURE — 2500000003 HC RX 250 WO HCPCS: Performed by: NURSE PRACTITIONER

## 2024-01-03 PROCEDURE — 6370000000 HC RX 637 (ALT 250 FOR IP): Performed by: INTERNAL MEDICINE

## 2024-01-03 PROCEDURE — 2580000003 HC RX 258: Performed by: NURSE PRACTITIONER

## 2024-01-03 PROCEDURE — 97110 THERAPEUTIC EXERCISES: CPT

## 2024-01-03 PROCEDURE — 97530 THERAPEUTIC ACTIVITIES: CPT

## 2024-01-03 RX ORDER — OXYCODONE HYDROCHLORIDE AND ACETAMINOPHEN 5; 325 MG/1; MG/1
1 TABLET ORAL EVERY 6 HOURS PRN
Qty: 12 TABLET | Refills: 0 | Status: SHIPPED | OUTPATIENT
Start: 2024-01-03 | End: 2024-01-06

## 2024-01-03 RX ORDER — INSULIN LISPRO 100 [IU]/ML
0-4 INJECTION, SOLUTION INTRAVENOUS; SUBCUTANEOUS
DISCHARGE
Start: 2024-01-03

## 2024-01-03 RX ORDER — INSULIN GLARGINE 100 [IU]/ML
15 INJECTION, SOLUTION SUBCUTANEOUS NIGHTLY
Qty: 10 ML | Refills: 3 | DISCHARGE
Start: 2024-01-03

## 2024-01-03 RX ORDER — INSULIN LISPRO 100 [IU]/ML
0-4 INJECTION, SOLUTION INTRAVENOUS; SUBCUTANEOUS NIGHTLY
DISCHARGE
Start: 2024-01-03

## 2024-01-03 RX ORDER — VANCOMYCIN HYDROCHLORIDE 50 MG/ML
KIT ORAL
Qty: 140 ML | DISCHARGE
Start: 2024-01-03

## 2024-01-03 RX ADMIN — Medication 250 MG: at 06:25

## 2024-01-03 RX ADMIN — OXYCODONE AND ACETAMINOPHEN 1 TABLET: 5; 325 TABLET ORAL at 12:41

## 2024-01-03 RX ADMIN — Medication 250 MG: at 11:59

## 2024-01-03 RX ADMIN — ASPIRIN 81 MG: 81 TABLET, COATED ORAL at 10:28

## 2024-01-03 RX ADMIN — Medication 250 MG: at 00:30

## 2024-01-03 RX ADMIN — CALCIUM CARBONATE-CHOLECALCIFEROL TAB 250 MG-125 UNIT 2 TABLET: 250-125 TAB at 10:29

## 2024-01-03 RX ADMIN — Medication 10 ML: at 10:30

## 2024-01-03 RX ADMIN — PRAZOSIN HYDROCHLORIDE 4 MG: 1 CAPSULE ORAL at 10:27

## 2024-01-03 RX ADMIN — INSULIN LISPRO 2 UNITS: 100 INJECTION, SOLUTION INTRAVENOUS; SUBCUTANEOUS at 11:59

## 2024-01-03 RX ADMIN — AMLODIPINE BESYLATE 10 MG: 5 TABLET ORAL at 10:28

## 2024-01-03 RX ADMIN — GABAPENTIN 200 MG: 100 CAPSULE ORAL at 10:27

## 2024-01-03 RX ADMIN — PALIPERIDONE 9 MG: 3 TABLET, EXTENDED RELEASE ORAL at 10:27

## 2024-01-03 RX ADMIN — PANTOPRAZOLE SODIUM 40 MG: 40 TABLET, DELAYED RELEASE ORAL at 10:28

## 2024-01-03 ASSESSMENT — PAIN SCALES - GENERAL
PAINLEVEL_OUTOF10: 5
PAINLEVEL_OUTOF10: 0
PAINLEVEL_OUTOF10: 7

## 2024-01-03 NOTE — PROGRESS NOTES
PROGRESS NOTE    HPI: Agustina Fried is a(n)63 y.o. female admitted for work-up and treatment for Normocytic anemia [D64.9]  Hypoglycemia [E16.2]  Altered mental status, unspecified altered mental status type [R41.82].     We are following for rectal bleeding.    Subjective:     Apparently she had mucous appearing diarrhea last night.  C. Diff was tested and positive.  There has been no further rectal bleeding.       Objective:     I/O last 3 completed shifts:  In: 30 [P.O.:30]  Out: 1700 [Urine:1700]      BP (!) 148/62   Pulse 60   Temp 98.8 °F (37.1 °C) (Oral)   Resp 17   Ht 1.6 m (5' 3\")   Wt 67.6 kg (149 lb 0.5 oz)   SpO2 97%   BMI 26.40 kg/m²     Physical Exam:  HEENT: anicteric sclera, oropharyngeal membranes pink and moist.  Cor: RRR  Lungs: non-labored, no respiratory distress  Abdomen: soft, NT. No ascites.  No hepatomegaly or splenomegaly  Extremities: no edema  Neuro: alert and oriented x 3      Results:   Lab Results   Component Value Date    ALT 39 12/26/2023    AST 49 (H) 12/26/2023     (H) 11/27/2023    ALKPHOS 223 (H) 12/26/2023    BILIDIR <0.2 11/27/2023    PROT 7.0 12/26/2023    LABALBU 3.2 (L) 12/26/2023    INR 0.99 09/05/2023    LIPASE 5.0 (L) 12/26/2023     Lab Results   Component Value Date    WBC 5.7 12/28/2023    HGB 8.7 (L) 12/28/2023    HCT 28.2 (L) 12/28/2023    MCV 91.1 12/28/2023     12/28/2023     BUN/Cr/glu/ALT/AST/amyl/lip:  21/1.4/--/--/--/--/-- (12/28 1002)  CT HEAD WO CONTRAST    Result Date: 12/26/2023  No acute intracranial abnormality.     XR CHEST PORTABLE    Result Date: 12/26/2023  1. Cardiomegaly. 2. Blunting of the right costophrenic angle which could be due to a small pleural effusion.         Impression:  62 year old female with past medical history of DM, CKD, HTN, Wilm's tumor s/p right nephrectomy, breast cancer 2014 s/p radiation and lumpectomy, CAD on Brilinta/ASA, depression, anxiety, chronic 
                                     PROGRESS NOTE  S:63 yrs Patient  admitted on 12/26/2023 with Normocytic anemia [D64.9]  Hypoglycemia [E16.2]  Altered mental status, unspecified altered mental status type [R41.82] .    Current Hospital [place] Meds   insulin lispro  0.05 Units/kg SubCUTAneous TID WC    insulin lispro  0-4 Units SubCUTAneous TID WC    insulin lispro  0-4 Units SubCUTAneous Nightly    vancomycin  250 mg Oral 4 times per day    atorvastatin  40 mg Oral Nightly    pantoprazole  40 mg Oral Daily    amLODIPine  10 mg Oral Daily    paliperidone  9 mg Oral QAM    gabapentin  200 mg Oral TID    sodium chloride flush  5-40 mL IntraVENous 2 times per day    melatonin  5 mg Oral Nightly     Current Hospital IV Meds   dextrose      sodium chloride       Current Hospital PRN Meds  oxyCODONE-acetaminophen, labetalol, glucose, dextrose bolus **OR** dextrose bolus, glucagon (rDNA), dextrose, sodium chloride flush, sodium chloride, ondansetron **OR** ondansetron, polyethylene glycol, acetaminophen **OR** acetaminophen    Exam:   Vitals:    12/29/23 1545   BP: (!) 153/67   Pulse: 60   Resp: 16   Temp: 98.5 °F (36.9 °C)   SpO2: 95%     I/O last 3 completed shifts:  In: 1600 [P.O.:1600]  Out: 1600 [Urine:1600]   General appearance: alert, appears stated age, and cooperative  HEENT: PERRLA  Neck: no adenopathy, no carotid bruit, no JVD, supple, symmetrical, trachea midline, and thyroid not enlarged, symmetric, no tenderness/mass/nodules  Lungs: clear to auscultation bilaterally  Heart: regular rate and rhythm, S1, S2 normal, no murmur, click, rub or gallop  Abdomen: soft, non-tender; bowel sounds normal; no masses,  no organomegaly  Extremities: extremities normal, atraumatic, no cyanosis or edema     Labs:  CBC:   Recent Labs     12/27/23  0603 12/28/23  1002 12/29/23  0611   WBC 5.6 5.7 7.0   HGB 8.3* 8.7* 8.5*   HCT 26.9* 28.2* 26.7*   MCV 89.9 91.1 89.2    143 156     BMP:   Recent Labs     12/27/23  0603 
  Hospital Medicine Progress Note      Date of Admission: 12/26/2023  Hospital Day: 2    Chief Admission Complaint:    Chief Complaint   Patient presents with    Altered Mental Status     Pt from [place] to ED for AMS found unresponsive. FSBS low and gave 25g of D10. Pt FSBS then went to 235. EMS then gave 4mg of narcan. Pt became more arousable but still altered.      Subjective:    Patient alert and oriented today, able to converse with me. She does not recall all of the events from the past few days, but she does tell me she had a bright red bloody bowel movement a few days ago. GI consult is pending, appreciate their input    Presenting Admission History:       63 y.o. female with a PMH of colon cancer s/p right hemicolectomy, CAD, PVD, stage IIIa CKD, diabetes mellitus type 2, hypertension hyperlipidemia, chronic diarrhea, and bipolar disorder who [facility] to [facility] due to being unresponsive. Patient was found unresponsive by staff at [facility], FSBS was low. EMS was called. She was given dextrose for glucose of 21 and Narcan 4mg.      In ED, H&H 6.2/20, VBG with no significant acidosis or alkalosis  CMP with normal sodium, mildly elevated potassium at 5.5-hemolyzed, baseline renal function with a BUN of 19, creatinine of 1.8 and GFR of 31, elevated alk phos to 223, elevated AST to 49 with a normal ALT and bilirubin, Lipase was normal  Ethanol not detected, Negative acetaminophen and salicylate. 1 unit PRBC's transfused.      Assessment/Plan:       Current Principal Problem:  Hypoglycemia     Acute metabolic encephalopathy due to hypoglycemia, Diabetes type II:   A1C noted to be 8.8 on 12/11/23.  Hold home regimen (Amaryl, Actos). Hypoglycemic protocol. Check UA, CXR: Cardiomegaly, blunting of the right costophrenic angle which could be due to a small pleural effusion. Trop 83-81-[id number] due to CKD, ECG NSR. Hold gabapentin. Check head CT.     Essential hypertension.  Continue amlodipine, 
  Hospital Medicine Progress Note      Date of Admission: 12/26/2023  Hospital Day: 9    Chief Admission Complaint:  ***     Subjective:  ***    Telemetry (reviewed by me):  ***    Presenting Admission History:       The patient is a pleasant 63 Y F with a h/o former smoking, HTN, HLD, DM2, CAD s/p stenting, CHF, CKD3, CVA, bipolar affective disorder, and anxiety.  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  She appears chronically ill and it looks like this is her 5th admission in the last 4 months.  C.diff, SHARRON, and confusion are recurring themes.  This time her [facility] SNF found her unresponsive.  Her sugar was 21 and her Hb was 6.2 (baseline in the 8's).       Assessment/Plan:      Current Principal Problem:  Hypoglycemia    Hypoglycemia, in the setting of DM2.  Sugars became quite elevated here when we stopped her meds.  A1c misleadingly low at 5.8, in the setting of ABLA.  Stopped her glimepiride and pioglitazone.  She is in a wasting state and low-dose insulin regimen would be better, will plan to discharge with this.     Recurrence of C.diff colitis. Chart/lab review notes positive C.diff testing 9/3/23, 11/3/23 and 12/27/23. PO vancomycin was started on 12/28 and seemed to be effective, so did not change to Fidaxomicin. Will complete 14 day course with taper. Ideally would stop PP, but with active GI bleed, benefits likely outweigh risks.      ABLA due to lower GIB, probably from the c.diff.  Also underlying anemia of chronic disease.  Required 1u pRBCs here, Hb stabilized.  GI managed conservatively without endoscopy, but will reassess as outpatient.  PPI.     Acute metabolic encephalopathy, due to above issues.  Improved.  OK to continue gabapentin.      Hyperkalemia.  K tends to be a little high chronically.  Asked her to stop eating 2-3 bananas per day.  Gave lokelma here.      CAD, h/o CVA.  Restarted 
Applied pneumatic compression stockings per order   
BP noted of 183/76 an 173/68 on recheck. PRN labetalol given, /64 on 0000 recheck, no further issues at this time, pt denies any symptoms.   
Called [facility], pt's discharging facility, to give report. Line kept ringing, no option to leave voicemail.   
Called Mel and left VM  
Called [facility] again to give report.  transferred me to Unit manager. Attempted to leave voicemail with callback number but voicemail was full.   
Hospital Medicine Progress Note        Subjective:  She is feeling much better.  Sugars too high.  She says she has had two loose BMs today, brown.       General appearance: No apparent distress, appears stated age and cooperative.  HEENT: Pupils equal, round.  Conjunctivae/corneas clear.  Neck: No jugular venous distention.   Respiratory:  Normal respiratory effort.  Bilaterally without Rales/Wheezes/Rhonchi.  Cardiovascular: Normal rate and regular rhythm with normal S1/S2 without murmurs, rubs or gallops.  Abdomen: Soft, non-tender, non-distended with normal bowel sounds.  Musculoskeletal: No cyanosis bilaterally.  No edema.  Without deformity.  Skin: No jaundice.  No rashes or lesions.  Neurologic:  Neurovascularly intact without any focal sensory/motor deficits. Cranial nerves: II-XII intact, grossly non-focal.  Psychiatric: Alert and oriented, limited insight.      Assessment and Plan:       The patient is a pleasant 63 Y F with a h/o former smoking, HTN, HLD, DM2, CAD s/p stenting, CHF, CKD3, CVA, bipolar affective disorder, and anxiety.  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  She appears chronically ill and it looks like this is her 5th admission in the last 4 months.  C.diff, HSARRON, and confusion are recurring themes.  This time her [facility] SNF found her unresponsive.  Her sugar was 21 and her Hb was 6.2 (baseline in the 8's).        Hypoglycemia, in the setting of DM2.  Sugars became quite elevated here when we stopped her meds.  A1c misleadingly low at 5.8, in the setting of ABLA.  Stopped her glimepiride and pioglitazone.  She is in a wasting state and insulin regimen would be better, will discharge with this.    Recurrence of c.diff colitis.  PO vancomycin was started on 12/28 and seemed to be effective, so did not change to fidaxomicin.      ABLA due to lower GIB, probably from the c.diff.  Also 
Hospital Medicine Progress Note        Subjective:  She is feeling tired.        General appearance: No apparent distress, appears stated age and cooperative.  HEENT: Pupils equal, round.  Conjunctivae/corneas clear.  Neck: No jugular venous distention.   Respiratory:  Normal respiratory effort.  Bilaterally without Rales/Wheezes/Rhonchi.  Cardiovascular: Normal rate and regular rhythm with normal S1/S2 without rubs or gallops.  2/6 systolic murmur.  Abdomen: Soft, non-tender, non-distended with normal bowel sounds.  Musculoskeletal: No cyanosis bilaterally.  No edema.  Without deformity.  Skin: No jaundice.  No rashes or lesions.  Neurologic:  Neurovascularly intact without any focal sensory/motor deficits. Cranial nerves: II-XII intact, grossly non-focal.  Psychiatric: Alert and oriented, limited insight.      Assessment and Plan:       The patient is a pleasant 63 Y F with a h/o former smoking, HTN, HLD, DM2, CAD s/p stenting, CHF, CKD3, CVA, bipolar affective disorder, and anxiety.  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  She appears chronically ill and it looks like this is her 5th admission in the last 4 months.  C.diff, SHARRON, and confusion are recurring themes.  This time her [facility] SNF found her unresponsive.  Her sugar was 21 and her Hb was 6.2 (baseline in the 8's).        Hypoglycemia, in the setting of DM2.  Sugars became quite elevated here when we stopped her meds.  A1c misleadingly low at 5.8, in the setting of ABLA.  Stopped her glimepiride and pioglitazone.  She is in a wasting state and low-dose insulin regimen would be better, will discharge with this.    Recurrence of c.diff colitis.  PO vancomycin was started on 12/28 and seemed to be effective, so did not change to fidaxomicin.      ABLA due to lower GIB, probably from the c.diff.  Also underlying anemia of chronic disease.  Required 1u pRBCs 
Hospital Medicine Progress Note        Subjective:  She is feeling tired.  Very high risk for readmission, still needs some tuning up.  Cr trending up, K still high, sugars 300's.  Diarrhea has improved, had two loose, brown BM's yesterday, not watery.      General appearance: No apparent distress, appears stated age and cooperative.  HEENT: Pupils equal, round.  Conjunctivae/corneas clear.  Neck: No jugular venous distention.   Respiratory:  Normal respiratory effort.  Bilaterally without Rales/Wheezes/Rhonchi.  Cardiovascular: Normal rate and regular rhythm with normal S1/S2 without rubs or gallops.  2/6 systolic murmur.  Abdomen: Soft, non-tender, non-distended with normal bowel sounds.  Musculoskeletal: No cyanosis bilaterally.  No edema.  Without deformity.  Skin: No jaundice.  No rashes or lesions.  Neurologic:  Neurovascularly intact without any focal sensory/motor deficits. Cranial nerves: II-XII intact, grossly non-focal.  Psychiatric: Alert and oriented, limited insight.      Assessment and Plan:       The patient is a pleasant 63 Y F with a h/o former smoking, HTN, HLD, DM2, CAD s/p stenting, CHF, CKD3, CVA, bipolar affective disorder, and anxiety.  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  She appears chronically ill and it looks like this is her 5th admission in the last 4 months.  C.diff, SHARRON, and confusion are recurring themes.  This time her [facility] SNF found her unresponsive.  Her sugar was 21 and her Hb was 6.2 (baseline in the 8's).        Hypoglycemia, in the setting of DM2.  Sugars became quite elevated here when we stopped her meds.  A1c misleadingly low at 5.8, in the setting of ABLA.  Stopped her glimepiride and pioglitazone.  She is in a wasting state and low-dose insulin regimen would be better, will plan to discharge with this.    Recurrence of c.diff colitis.  PO vancomycin was started on 
Latest . Patient now awake in bed eating dinner, much more alert. Oral temp 97.7, Bare Hugger turned off. Patient transferred to BS with standby assist and had large BM. Sample sent to the lab. Will continue to monitor. Call light in reach.   
Occult stool sample came back positive. MD notified. Awaiting response  
Occupational Therapy  Facility/Department: [facility] C4 U  Occupational Therapy Initial Assessment    Name: Agustina Fried  : 1960  MRN: [id number]  Date of Service: 2023    Discharge Recommendations:  Subacute/Skilled Nursing Facility (return to SNF)  OT Equipment Recommendations  Equipment Needed: No       Patient Diagnosis(es): The primary encounter diagnosis was Altered mental status, unspecified altered mental status type. Diagnoses of Hypoglycemia and Normocytic anemia were also pertinent to this visit.  Past Medical History:  has a past medical history of Abnormal brain MRI, Acute bilateral low back pain without sciatica, SHARRON (acute kidney injury) ([facility]), Arthritis, Bipolar disorder ([facility]), CAD (coronary artery disease), Cancer ([facility]), Carotid stenosis, bilateral:<50%:per [place] 2016, Carpal tunnel syndrome, Cervical cancer screening, Coronary artery disease of native artery of native heart with stable angina pectoris ([facility]), DDD (degenerative disc disease), lumbar, Depression, Depression/anxiety, Depression/anxiety, Diabetes mellitus ([facility]), Gout, History of mammogram, History of therapeutic radiation, Hyperlipidemia, Hypertension, 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 stage 3 chronic kidney disease ([facility]), Microalbuminuria, Neuropathy in diabetes ([facility]), Non morbid obesity, Pancreatitis, S/P endoscopy, Scoliosis, Spondylosis of lumbar region without myelopathy or radiculopathy, Transient cerebral ischemia, and Unspecified cerebral artery occlusion with cerebral infarction.  Past Surgical History:  has a past surgical history that includes Kidney removal; Hysterectomy; Breast lumpectomy (); Tubal ligation; other surgical history (Right); Cardiac catheterization (2020); Upper gastrointestinal endoscopy (N/A, 2021); Colonoscopy (N/A, 2021); CT BIOPSY BONE MARROW (2/3/2021); Temporal Artery Biopsy (Right, 2021); Upper gastrointestinal 
Patient admitted from ED to room 441 unstable, rectal temp 93.6, /68. Patient able to state who she is, what year it is, and what city, but unaware of her location and why she is in the hospital. Blood sugar recheck and 105. Glucometer and bag of D10 primed and at bedside. Bear hugger placed on patient. CLWR  
Patient speaking to [place] on the phone   
Patient to be transferred to room 114, report given to PONCHO Garcia, cmu aware.     RN called primary decision maker, Maurice, to update on transfer, no answer.  RN spoke with Marlyn GILES, to update on transfer to room 114.  
Physical Therapy    PT order received, chart reviewed.  Per RN, hold therapy evaluation today due to unstable blood sugar levels.  Will re-attempt PT on 12/27/23.    Thank you.    Haydee Del Toro  PT, DPT #[id number]      
Physical Therapy  [address]  Physical Therapy Initial Assessment    Name: Agustina Fried  : 1960  MRN: [id number]  Date of Service: 2023    Discharge Recommendations:  Long Term Care with PT   PT Equipment Recommendations  Equipment Needed: No      Patient Diagnosis(es): The primary encounter diagnosis was Altered mental status, unspecified altered mental status type. Diagnoses of Hypoglycemia and Normocytic anemia were also pertinent to this visit.  Past Medical History:  has a past medical history of Abnormal brain MRI, Acute bilateral low back pain without sciatica, SHARRON (acute kidney injury) ([facility]), Arthritis, Bipolar disorder ([facility]), CAD (coronary artery disease), Cancer (HCC), Carotid stenosis, bilateral:<50%:per [place] 2016, Carpal tunnel syndrome, Cervical cancer screening, Coronary artery disease of native artery of native heart with stable angina pectoris ([facility]), DDD (degenerative disc disease), lumbar, Depression, Depression/anxiety, Depression/anxiety, Diabetes mellitus (HCC), Gout, History of mammogram, History of therapeutic radiation, Hyperlipidemia, Hypertension, 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 stage 3 chronic kidney disease (HCC), Microalbuminuria, Neuropathy in diabetes ([facility]), Non morbid obesity, Pancreatitis, S/P endoscopy, Scoliosis, Spondylosis of lumbar region without myelopathy or radiculopathy, Transient cerebral ischemia, and Unspecified cerebral artery occlusion with cerebral infarction.  Past Surgical History:  has a past surgical history that includes Kidney removal; Hysterectomy; Breast lumpectomy (); Tubal ligation; other surgical history (Right); Cardiac catheterization (2020); Upper gastrointestinal endoscopy (N/A, 2021); Colonoscopy (N/A, 2021); CT BIOPSY BONE MARROW (2/3/2021); Temporal Artery Biopsy (Right, 2021); Upper gastrointestinal endoscopy (N/A, 12/15/2022); 
Physical Therapy  Facility/Department: [facility]  Daily Treatment Note  NAME: Agustina Fried  : 1960  MRN: [id number]    Date of Service: 2023    Discharge Recommendations:  Subacute/Skilled Nursing Facility   PT Equipment Recommendations  Equipment Needed: No    Therapy discharge recommendations take into account each patient's current medical complexities and are subject to input/oversight from the patient's healthcare team.   Barriers to Home Discharge:   [] Steps to access home entry or bed/bath:   [x] Unable to transfer, ambulate, or propel wheelchair household distances without assist   [x] Limited available assist at home upon discharge    [x] Patient or family requests d/c to post-acute facility    [] Poor cognition/safety awareness for d/c to home alone    []Unable to maintain ordered weight bearing status    [] Patient with salient signs of long-standing immobility   [x] Patient is at risk for falls   [] Other:    If pt is unable to be seen after this session, please let this note serve as discharge summary.  Please see case management note for discharge disposition.  Thank you.    Patient Diagnosis(es): The primary encounter diagnosis was Altered mental status, unspecified altered mental status type. Diagnoses of Hypoglycemia and Normocytic anemia were also pertinent to this visit.    Assessment   Assessment: Pt demonstrating good improvement this session with transfers and ambulation. Pt grossly SBA/CGA for all functional mobility. Pt does require intermittent cues for safety and has limited assistance available at home. Recommend SNF for continued therapy at discharge.  Activity Tolerance: Patient tolerated treatment well  Equipment Needed: No     Plan    Physical Therapy Plan  General Plan: 3-5 times per week  Current Treatment Recommendations: Strengthening;ROM;Balance training;Functional mobility training;Transfer training;ADL/Self-care training;IADL training;Neuromuscular 
Pt A&Ox 4 on RA. AM assessment and vitals completed and put into flowsheets. AM medications given with no s/s of aspiration. Pt with no questions or concerns voiced to RN at this time. Fall precautions in place and call light within reach.     
Pt Aox4   
Pt voided post petersen removal   
Pt with active discharge order. RN went over discharge instructions with patient, states understanding. IV and telemetry removed. Pt with no questions or concerns voiced to RN at this time. Transportation here to get patient, pt agreeable with current plan of care.    
Pt's BS . NP Mone notified. New orders are to check blood sugar every 4 hours and a sliding scale was also ordered.  
Received patient on bed, alert and oriented x4. Vitals are stable. Denies pain nor dizziness at the time of assessment.     Call bell within reach. Walker x 1 to the bathroom. Maintained a calm and comfortable environment. Shift assessment updated and documented.  
Removed petersen catheter per protocol. Pt tolerated well. Applied purewick  
Report called PONCHO Marie at [facility]  
Spoke to patient's sister and provided an update   
This nurse notified Susi Black NP who is taking messages for Arslan Oden MD regarding blood sugar is 383, no SSI coverage ordered. Per GI they are canceling the Colon for in the morning due to + C-diff results and rescheduling for outpatient procedure so the patient is allowed to eat per GI.   Message was read, no response. No new orders as of 1908  
GIB, probably from the c.diff.  Also underlying anemia of chronic disease.  Required 1u pRBCs here, Hb stabilized.  GI managed conservatively without endoscopy, but will reassess as outpatient.  PPI.    Acute metabolic encephalopathy, due to above issues.  Improved.  OK to continue gabapentin.     CAD, h/o CVA.  Restarted aspirin.  Her last coronary stent was in 2020, risk of ticagrelor outweighs benefit, stopped this med.  Continue statin.      HTN.  Amlodipine, prazosin.  HR tends to be a bit low, not on BB despite CAD hx.     CKD3.  Baseline Cr perhaps about 1.5, monitor. .     Bipolar affective disorder.  Paliperidone and trazodone.  She is on deutetrabenazine for tardive dyskinesia, resume this as outpatient.       Diet: ADULT DIET; Easy to Chew; 4 carb choices (60 gm/meal); Low Potassium (Less than 3000 mg/day)  DVT Prophylaxis: SCDs  Appropriate for A1: no  Disposition: PT/OT rec'd SNF.  Likely ready 1/1.  She will return to [facility] SNF.      -----------------------------------------------------------------------                              PCP: Viridiana Blanco APRN - NP    Date of Admission: 12/26/2023  Current Hospital Day: 6    Chief Admission Complaint: Altered Mental Status (Pt from [place] to ED for AMS found unresponsive. FSBS low and gave 25g of D10. Pt FSBS then went to 235. EMS then gave 4mg of narcan. Pt became more arousable but still altered. )       Hypoglycemia    Medications:  Personally reviewed in detail in conjunction w/ labs as documented for evidence of drug toxicity.     Infusion Medications    dextrose      sodium chloride       Scheduled Medications    aspirin  81 mg Oral Daily    calcium carb-cholecalciferol  2 tablet Oral Daily    prazosin  4 mg Oral BID    traZODone  200 mg Oral Nightly    insulin glargine  12 Units SubCUTAneous Nightly    insulin lispro  0-4 Units SubCUTAneous TID WC    insulin lispro  0-4 Units SubCUTAneous Nightly    vancomycin  250 mg Oral 4 times per day 
Transfer: Stand-by assistance (without grab bars, up to RW)  Gait Training  Gait Training: Yes  Gait  Overall Level of Assistance: Stand-by assistance;Contact-guard assistance  Distance (ft): 50 Feet  Assistive Device: Gait belt;Walker, rolling  Interventions: Safety awareness training;Verbal cues  Base of Support: Narrowed  Speed/Olga: Pace decreased (< 100 feet/min);Shuffled;Slow  Step Length: Left shortened;Right shortened  Gait Abnormalities: Decreased step clearance;Other (comment) (forward flexed posture with RW away from YOSSI)     PT Exercises  Exercise Treatment: Seated BLE exercises x15: AP, LAQ, marches; 15x sit-stands from EOB without use of UEs     Safety Devices  Type of Devices: Gait belt;Patient at risk for falls;Call light within reach;All fall risk precautions in place;Nurse notified;Left in bed;Bed alarm in place  Restraints  Restraints Initially in Place: No       Goals  Short Term Goals  Time Frame for Short Term Goals: 1 week, 1/3/24  Short Term Goal 1: Pt will perform bed mobility with AUSTIN  Short Term Goal 2: Pt will perform functional tf with LRAD and AUSTIN  Short Term Goal 3: Pt will tolerate 50 ft of amb without path deviations/cues to direct attention, use of RW, and SBA; goal met 1/1  Short Term Goal 4: Pt will perform 12-15 reps of BLE ex to improve function towards goals by 1/1/24; Goal met 12/29  Patient Goals   Patient Goals : \"to go home\"    Education  Patient Education  Education Given To: Patient  Education Provided: Role of Therapy;Plan of Care;Home Exercise Program;Transfer Training  Education Provided Comments: safety with mobility and calling for assist  Education Method: Verbal  Barriers to Learning: Cognition  Education Outcome: Verbalized understanding;Continued education needed    AM-PAC - Mobility    AM-PAC Basic Mobility - Inpatient   How much help is needed turning from your back to your side while in a flat bed without using bedrails?: None  How much help is needed 
[x]No    ------------------------------------------------------------------------------------------------------------------------------------------------------------------------    MDM      [x] High (any 2)    A. Problems (any 1)  [x] Acute/Chronic Illness/injury posing threat to life or bodily function:  bloody stools, anemia  [] Severe exacerbation of chronic illness:    ---------------------------------------------------------------------  B. Risk of Treatment (any 1)   [x] Drugs/treatments that require intensive monitoring for toxicity include:    [] IV ABX requiring serial renal monitoring for nephrotoxicity:     [] Post-Cath/Contrast study requiring serial renal monitoring for Contrast Induced Nephropathy  [] IV Narcotic analgesia for ADR   [] Aggressive IV diuresis requiring serial monitoring for renal impairment and electrolyte derangements  [] Hypertonic Saline requiring serial renal monitoring for appropriate electrolyte correction rate   [] Critical electrolyte abnormalities requiring IV replacement and close serial monitoring  [] Insulin - monitoring FSBS for Hypoglycemic ADR  [x] Other - monitoring S/S bleeding and need for Blood transfusion due to critically low h/H  [] Change in code status:    [] Decision to escalate care:    [] Major surgery/procedure with associated risk factors:    ----------------------------------------------------------------------  C. Data (any 2)  [x] Discussed management of the case with:  GI  [x] Discussed the discharge plan in detail with case mgt including timing/barriers to discharge, need for support services and placement decision   [] Imaging personally reviewed and interpreted, includes:   [] Telemetry monitoring w/ NSR   [x] Data Review (any 3)  [x] Collateral history obtained from:  pt ,review of emr  [x] All available Consultant notes from yesterday/today were reviewed  [x] All current labs were reviewed and interpreted for clinical significance   [x] Appropriate 
limited by fatigue.       10 consecutive reps of STS from arm chair with 1 UE pushing up from armrest, 1 UE on RW, min cues for safe hand placement    1x 15 BLE LAQ  1x 15 BLE seated marches  1x 15 yen seated 3 sec adduction holds against pillow    1x 10 BLE standing marches at RW with CGA  1x 10 BLE standing hamstring curls at RW with CGA  1x 10 BLE standing calf raises at RW with CGA        Safety Devices  Type of Devices: Gait belt;Patient at risk for falls;Call light within reach;All fall risk precautions in place;Left in chair;Nurse notified (no alarm needed per RN)  Restraints  Restraints Initially in Place: No       Goals  Short Term Goals  Time Frame for Short Term Goals: 1 week, 1/3/24  Short Term Goal 1: Pt will perform bed mobility with A[place]IN  Short Term Goal 2: Pt will perform functional tf with LRAD and AUSTIN  Short Term Goal 3: Pt will tolerate 50 ft of amb without path deviations/cues to direct attention, use of RW, and SBA; goal met 1/1  Short Term Goal 4: Pt will perform 12-15 reps of BLE ex to improve function towards goals by 1/1/24; Goal met 12/29  Patient Goals   Patient Goals : \"to go home\"    Education  Patient Education  Education Given To: Patient  Education Provided: Role of Therapy;Plan of Care;Home Exercise Program;Transfer Training  Education Provided Comments: safety with mobility and calling for assist  Education Method: Verbal  Barriers to Learning: Cognition  Education Outcome: Verbalized understanding;Continued education needed    AM-PAC - Mobility    AM-PAC Basic Mobility - Inpatient   How much help is needed turning from your back to your side while in a flat bed without using bedrails?: None  How much help is needed moving from lying on your back to sitting on the side of a flat bed without using bedrails?: None  How much help is needed moving to and from a bed to a chair?: A Little  How much help is needed standing up from a chair using your arms?: A Little  How much help is 
  Exercise Treatment: BUE AROM seated in chair  Hand flex/ext: x   20 Reps  Wrist flex/ext:  X  15 Reps  Elbow flex/ext:  x  15  Reps  Forearm sup/pron:  x   15 Reps  Shld flex/ext:  x  15  Reps             Education Given To: Patient  Education Provided: Role of Therapy;Plan of Care;Precautions  Education Provided Comments: disease specific: importance of use of RED/nurse call light for assist with ADL needs, transfers, positioning  Education Method: Demonstration;Verbal  Barriers to Learning: None  Education Outcome: Demonstrated understanding;Verbalized understanding                             AM-PAC - ADL  AM-PAC Daily Activity - Inpatient   How much help is needed for putting on and taking off regular lower body clothing?: A Lot  How much help is needed for bathing (which includes washing, rinsing, drying)?: A Lot  How much help is needed for toileting (which includes using toilet, bedpan, or urinal)?: None  How much help is needed for putting on and taking off regular upper body clothing?: A Little  How much help is needed for taking care of personal grooming?: A Little  How much help for eating meals?: A Little  AM-[facility] Inpatient Daily Activity Raw Score: 17  AM-PAC Inpatient ADL T-Scale Score : 37.26  ADL Inpatient CMS 0-100% Score: 50.11  ADL Inpatient CMS G-Code Modifier : CK    Tinneti Score       Goals  Short Term Goals  Time Frame for Short Term Goals: 01/02, unless otherwise noted  Short Term Goal 1: Pt will complete functional transfers/toilet transfer wtih SVP; 12/29 STG met; New STG: Modified independent with functional/toilet tansfers by 01-04-23  Short Term Goal 2: Pt will complete toileting with SVP; 12/29 STG  met, pt independent  Short Term Goal 3: Pt will complete UE HEP 15x reps 3-5 exer with SVP by 12/31; 12/29 STG met, continue for light strengthening  Short Term Goal 4: Pt will complete standing grooming tasks 2-3 with SVP; 12/29 SBA standing for 1 grooming task       Therapy Time   
  [x] Appropriate follow-up labs were ordered    Medications:  Personally reviewed in detail in conjunction w/ labs as documented for evidence of drug toxicity.     Infusion Medications    dextrose      sodium chloride       Scheduled Medications    vancomycin  250 mg Oral 4 times per day    atorvastatin  40 mg Oral Nightly    pantoprazole  40 mg Oral Daily    amLODIPine  10 mg Oral Daily    paliperidone  9 mg Oral QAM    gabapentin  200 mg Oral TID    sodium chloride flush  5-40 mL IntraVENous 2 times per day    melatonin  5 mg Oral Nightly     PRN Meds: labetalol, glucose, dextrose bolus **OR** dextrose bolus, glucagon (rDNA), dextrose, sodium chloride flush, sodium chloride, ondansetron **OR** ondansetron, polyethylene glycol, acetaminophen **OR** acetaminophen     Labs:  Personally reviewed and interpreted for clinical significance.     Recent Labs     12/26/23  0709 12/26/23 2224 12/27/23  0603 12/28/23  1002   WBC 5.0  --  5.6 5.7   HGB 6.2* 8.8* 8.3* 8.7*   HCT 20.0* 27.8* 26.9* 28.2*     --  141 143     Recent Labs     12/26/23  0709 12/27/23  0603 12/28/23  1002    139 135*   K 5.5* 5.2* 4.6    108 106   CO2 25 25 23   BUN 19 22* 21*   CREATININE 1.8* 1.3* 1.4*   CALCIUM 8.5 8.2* 8.4     Recent Labs     12/26/23  0709 12/26/23  0848   PROBNP 378*  --    TROPHS 62* 67*     No results for input(s): \"LABA1C\" in the last 72 hours.  Recent Labs     12/26/23  0709   AST 49*   ALT 39   BILITOT <0.2   ALKPHOS 223*     No results for input(s): \"INR\", \"LACTA\", \"TSH\" in the last 72 hours.    Urine Cultures:   Lab Results   Component Value Date/Time    LABURIN >100,000 CFU/ml 11/30/2023 02:47 PM     Blood Cultures:   Lab Results   Component Value Date/Time    BC No Growth after 4 days of incubation. 12/10/2023 01:15 PM     Lab Results   Component Value Date/Time    BLOODCULT2 No Growth after 4 days of incubation. 12/10/2023 01:15 PM     Organism:   Lab Results   Component Value Date/Time    ORG

## 2024-01-03 NOTE — CARE COORDINATION
CASE MANAGEMENT DISCHARGE SUMMARY      Discharge to: return to [facility] SN[facility]    Precertification completed: NA  Hospital Exemption Notification (HENS) completed: NA    IMM given: NA       Transportation:    Medical Transport explained to pt/family. Pt/family voice no agency preference.    Agency used:[facility]   time:1400   Ambulance form completed: Yes    Confirmed discharge plan with:     Patient: yes     Family:  yes, message left for reuben Richards     Facility/Agency, name:  [facility] to obtain from Epic   Phone number for report to facility: [phone]     RN, name: Jasmyne    Note: Discharging nurse to complete [facility], reconcile [facility], and place final copy with patient's discharge packet. RN to ensure that written prescriptions for  Level II medications are sent with patient to the facility as per protocol.        
Attempted to see patient.  Patient currently sleeping and RN has been unable to contact family.  Electronically signed by MAYELA Arias on 12/26/2023 at 8:55 AM   
Chart review day 3- pt from [facility] skilled. Plan is to return. [facility] started precert on 12/28. At this time precert is still pending. Cm will follow for DCP needs.   
Chart review day 7- from [facility]. Precert for SNF for [facility] good through tomorrow 1/3/24, Cm confirmed with Shoshana at [facility]. Pt will  need transport arranged.   
Natalia spoke with Vinicio at [facility]. Pt is skilled at their facility and will need precert to return. She does not have access to EPIC and needs documents emailed to her.Natalia asked for precert to be started today as pt may be ready for dc tomorrow.  NATALIA emailed documents to [email or web address]  
Home Care services:  (P) None    ADLS  Prior functional level: (P) Independent in ADLs/IADLs  Current functional level: (P) Assistance with the following:, Cooking, Housework, Shopping, Mobility, Bathing    PT AM-PAC: 19 /24  OT AM-PAC: 14 /24    Family can provide assistance at DC: (P) No  Would you like Case Management to discuss the discharge plan with any other family members/significant others, and if so, who? (P) Yes (Dtr)  Plans to Return to Present Housing: (P) Yes  Other Identified Issues/Barriers to RETURNING to current housing: none  Potential Assistance needed at discharge: (P) Skilled Nursing Facility            Potential DME:  none  Patient expects to discharge to: (P) Skilled nursing facility  Plan for transportation at discharge: (P) Self    Financial    Payor: [employer] [place] MEDICAID / Plan: [employer] [place] / Product Type: *No Product type* /     Does insurance require precert for SNF: Yes    Potential assistance Purchasing Medications: (P) No  Meds-to-Beds request:        [facility] - [address] -  [phone] - F [phone]  [address] [facility] [address]  Phone: [phone] Fax: [phone]    [address] - [address] [facility] [phone] - F [phone]  [address] [address]  Phone: [phone] Fax: [phone]    [facility] #[id number] - [place], [place] - [address] - P [phone] - F [phone]  [address] [facility] [address]  Phone: [phone] Fax: [phone]      Notes:    Factors facilitating achievement of predicted outcomes: Cooperative, Pleasant, and Good insight into deficits    Barriers to discharge: Limited family support     Additional Case Management Notes: Met with the Pt at the bedside. She is from [facility] SN[facility] and plans to return. Multiple re admits.     The Plan for Transition of Care is related to the

## 2024-01-03 NOTE — PLAN OF CARE
Problem: Discharge Planning  Goal: Discharge to home or other facility with appropriate resources  1/2/2024 2123 by Jason Gonzalez RN  Outcome: Progressing  Flowsheets (Taken 12/31/2023 2000 by Belinda Lopez RN)  Discharge to home or other facility with appropriate resources: Identify barriers to discharge with patient and caregiver     Problem: Safety - Adult  Goal: Free from fall injury  1/2/2024 2123 by Jason Gonzalez RN  Outcome: Progressing  Flowsheets (Taken 12/29/2023 0750 by Sol Monreal, RN)  Free From Fall Injury: Instruct family/caregiver on patient safety     Problem: Skin/Tissue Integrity  Goal: Absence of new skin breakdown  Description: 1.  Monitor for areas of redness and/or skin breakdown  2.  Assess vascular access sites hourly  3.  Every 4-6 hours minimum:  Change oxygen saturation probe site  4.  Every 4-6 hours:  If on nasal continuous positive airway pressure, respiratory therapy assess nares and determine need for appliance change or resting period.  1/2/2024 2123 by Jason Gonzalez RN  Outcome: Progressing     Problem: Chronic Conditions and Co-morbidities  Goal: Patient's chronic conditions and co-morbidity symptoms are monitored and maintained or improved  1/2/2024 2123 by Jason Gonzalez RN  Outcome: Progressing  Flowsheets (Taken 12/30/2023 2043 by Valeria Arizmendi, RN)  Care Plan - Patient's Chronic Conditions and Co-Morbidity Symptoms are Monitored and Maintained or Improved: Collaborate with multidisciplinary team to address chronic and comorbid conditions and prevent exacerbation or deterioration     Problem: Pain  Goal: Verbalizes/displays adequate comfort level or baseline comfort level  Outcome: Progressing  Flowsheets (Taken 12/29/2023 1545 by Sol Monreal, RN)  Verbalizes/displays adequate comfort level or baseline comfort level: Encourage patient to monitor pain and request assistance     Problem: Skin/Tissue Integrity - Adult  Goal: Skin integrity remains intact  Outcome: 
  Problem: Discharge Planning  Goal: Discharge to home or other facility with appropriate resources  12/28/2023 1733 by Sol Monreal RN  Outcome: Progressing     Problem: Safety - Adult  Goal: Free from fall injury  12/28/2023 1733 by Sol Monreal RN  Outcome: Progressing     Problem: Skin/Tissue Integrity  Goal: Absence of new skin breakdown  Description: 1.  Monitor for areas of redness and/or skin breakdown  2.  Assess vascular access sites hourly  3.  Every 4-6 hours minimum:  Change oxygen saturation probe site  4.  Every 4-6 hours:  If on nasal continuous positive airway pressure, respiratory therapy assess nares and determine need for appliance change or resting period.  12/28/2023 1733 by Sol Monreal RN  Outcome: Progressing     Problem: Chronic Conditions and Co-morbidities  Goal: Patient's chronic conditions and co-morbidity symptoms are monitored and maintained or improved  12/28/2023 1733 by Sol Monreal RN  Outcome: Progressing     Problem: Pain  Goal: Verbalizes/displays adequate comfort level or baseline comfort level  12/28/2023 1733 by Sol Monreal RN  Outcome: Progressing     
  Problem: Discharge Planning  Goal: Discharge to home or other facility with appropriate resources  12/29/2023 1353 by Sol Monreal, RN  Outcome: Progressing  Flowsheets (Taken 12/29/2023 0750)  Discharge to home or other facility with appropriate resources: Identify barriers to discharge with patient and caregiver     Problem: Safety - Adult  Goal: Free from fall injury  12/29/2023 1353 by Sol Monreal, RN  Outcome: Progressing  Flowsheets (Taken 12/29/2023 0750)  Free From Fall Injury: Instruct family/caregiver on patient safety     Problem: Skin/Tissue Integrity  Goal: Absence of new skin breakdown  Description: 1.  Monitor for areas of redness and/or skin breakdown  2.  Assess vascular access sites hourly  3.  Every 4-6 hours minimum:  Change oxygen saturation probe site  4.  Every 4-6 hours:  If on nasal continuous positive airway pressure, respiratory therapy assess nares and determine need for appliance change or resting period.  Outcome: Progressing     Problem: Chronic Conditions and Co-morbidities  Goal: Patient's chronic conditions and co-morbidity symptoms are monitored and maintained or improved  Outcome: Progressing  Flowsheets (Taken 12/29/2023 0750)  Care Plan - Patient's Chronic Conditions and Co-Morbidity Symptoms are Monitored and Maintained or Improved: Monitor and assess patient's chronic conditions and comorbid symptoms for stability, deterioration, or improvement     Problem: Pain  Goal: Verbalizes/displays adequate comfort level or baseline comfort level  Outcome: Progressing  Flowsheets  Taken 12/29/2023 1200  Verbalizes/displays adequate comfort level or baseline comfort level: Encourage patient to monitor pain and request assistance  Taken 12/29/2023 0755  Verbalizes/displays adequate comfort level or baseline comfort level: Encourage patient to monitor pain and request assistance     
  Problem: Discharge Planning  Goal: Discharge to home or other facility with appropriate resources  12/30/2023 0337 by Galina Middleton RN  Outcome: Progressing  12/29/2023 1353 by Sol Monreal RN  Outcome: Progressing  Flowsheets (Taken 12/29/2023 0750)  Discharge to home or other facility with appropriate resources: Identify barriers to discharge with patient and caregiver     Problem: Safety - Adult  Goal: Free from fall injury  12/30/2023 0337 by Galina Middleton RN  Outcome: Progressing  12/29/2023 1353 by Sol Monreal RN  Outcome: Progressing  Flowsheets (Taken 12/29/2023 0750)  Free From Fall Injury: Instruct family/caregiver on patient safety     Problem: Skin/Tissue Integrity  Goal: Absence of new skin breakdown  Description: 1.  Monitor for areas of redness and/or skin breakdown  2.  Assess vascular access sites hourly  3.  Every 4-6 hours minimum:  Change oxygen saturation probe site  4.  Every 4-6 hours:  If on nasal continuous positive airway pressure, respiratory therapy assess nares and determine need for appliance change or resting period.  12/30/2023 0337 by Galina Middleton RN  Outcome: Progressing  12/29/2023 1353 by Sol Monreal RN  Outcome: Progressing     Problem: Chronic Conditions and Co-morbidities  Goal: Patient's chronic conditions and co-morbidity symptoms are monitored and maintained or improved  12/30/2023 0337 by Galina Middleton RN  Outcome: Progressing  12/29/2023 1353 by Sol Monreal RN  Outcome: Progressing  Flowsheets (Taken 12/29/2023 0750)  Care Plan - Patient's Chronic Conditions and Co-Morbidity Symptoms are Monitored and Maintained or Improved: Monitor and assess patient's chronic conditions and comorbid symptoms for stability, deterioration, or improvement     Problem: Pain  Goal: Verbalizes/displays adequate comfort level or baseline comfort level  12/30/2023 0337 by Galina Middleton RN  Outcome: Progressing  Flowsheets (Taken 12/29/2023 1545 by 
  Problem: Discharge Planning  Goal: Discharge to home or other facility with appropriate resources  12/31/2023 0035 by Valeria Arizmendi RN  Outcome: Progressing  Flowsheets (Taken 12/30/2023 2043)  Discharge to home or other facility with appropriate resources:   Identify barriers to discharge with patient and caregiver   Identify discharge learning needs (meds, wound care, etc)     Problem: Safety - Adult  Goal: Free from fall injury  12/31/2023 0035 by Valeria Arizmendi RN  Outcome: Progressing     Problem: Skin/Tissue Integrity  Goal: Absence of new skin breakdown  Description: 1.  Monitor for areas of redness and/or skin breakdown  2.  Assess vascular access sites hourly  3.  Every 4-6 hours minimum:  Change oxygen saturation probe site  4.  Every 4-6 hours:  If on nasal continuous positive airway pressure, respiratory therapy assess nares and determine need for appliance change or resting period.  12/31/2023 0035 by Valeria Arizmendi RN  Outcome: Progressing     Problem: Chronic Conditions and Co-morbidities  Goal: Patient's chronic conditions and co-morbidity symptoms are monitored and maintained or improved  12/31/2023 0035 by Valeria Arizmendi RN  Outcome: Progressing  Flowsheets (Taken 12/30/2023 2043)  Care Plan - Patient's Chronic Conditions and Co-Morbidity Symptoms are Monitored and Maintained or Improved: Collaborate with multidisciplinary team to address chronic and comorbid conditions and prevent exacerbation or deterioration     Problem: Pain  Goal: Verbalizes/displays adequate comfort level or baseline comfort level  12/31/2023 0035 by Valeria Arizmendi RN  Outcome: Progressing     Problem: Neurosensory - Adult  Goal: Achieves stable or improved neurological status  12/31/2023 0035 by Valeria Arizmendi RN  Outcome: Progressing  Flowsheets (Taken 12/30/2023 2043)  Achieves stable or improved neurological status: Assess for and report changes in neurological status     Problem: Neurosensory - Adult  Goal: Remains free of 
  Problem: Discharge Planning  Goal: Discharge to home or other facility with appropriate resources  12/31/2023 1157 by Lorin Tellez RN  Outcome: Progressing  12/31/2023 0035 by Valeria Arizmendi RN  Outcome: Progressing  Flowsheets (Taken 12/30/2023 2043)  Discharge to home or other facility with appropriate resources:   Identify barriers to discharge with patient and caregiver   Identify discharge learning needs (meds, wound care, etc)     Problem: Safety - Adult  Goal: Free from fall injury  12/31/2023 1157 by Lorin Tellez RN  Outcome: Progressing  12/31/2023 0035 by Valeria Arizmendi RN  Outcome: Progressing     Problem: Skin/Tissue Integrity  Goal: Absence of new skin breakdown  Description: 1.  Monitor for areas of redness and/or skin breakdown  2.  Assess vascular access sites hourly  3.  Every 4-6 hours minimum:  Change oxygen saturation probe site  4.  Every 4-6 hours:  If on nasal continuous positive airway pressure, respiratory therapy assess nares and determine need for appliance change or resting period.  12/31/2023 1157 by Lorin Tellez RN  Outcome: Progressing  12/31/2023 0035 by Valeria Arizmendi RN  Outcome: Progressing     Problem: Chronic Conditions and Co-morbidities  Goal: Patient's chronic conditions and co-morbidity symptoms are monitored and maintained or improved  12/31/2023 1157 by Lorin Tellez RN  Outcome: Progressing  12/31/2023 0035 by Valeria Arizmendi RN  Outcome: Progressing  Flowsheets (Taken 12/30/2023 2043)  Care Plan - Patient's Chronic Conditions and Co-Morbidity Symptoms are Monitored and Maintained or Improved: Collaborate with multidisciplinary team to address chronic and comorbid conditions and prevent exacerbation or deterioration     Problem: Pain  Goal: Verbalizes/displays adequate comfort level or baseline comfort level  12/31/2023 1157 by Lorin Tellez RN  Outcome: Progressing  12/31/2023 0035 by Valeria Arizmendi RN  Outcome: Progressing     Problem: 
  Problem: Discharge Planning  Goal: Discharge to home or other facility with appropriate resources  Outcome: Progressing     Problem: Safety - Adult  Goal: Free from fall injury  Outcome: Progressing     Problem: Skin/Tissue Integrity  Goal: Absence of new skin breakdown  Description: 1.  Monitor for areas of redness and/or skin breakdown  2.  Assess vascular access sites hourly  3.  Every 4-6 hours minimum:  Change oxygen saturation probe site  4.  Every 4-6 hours:  If on nasal continuous positive airway pressure, respiratory therapy assess nares and determine need for appliance change or resting period.  Outcome: Progressing     
  Problem: Discharge Planning  Goal: Discharge to home or other facility with appropriate resources  Outcome: Progressing     Problem: Safety - Adult  Goal: Free from fall injury  Outcome: Progressing     Problem: Skin/Tissue Integrity  Goal: Absence of new skin breakdown  Description: 1.  Monitor for areas of redness and/or skin breakdown  2.  Assess vascular access sites hourly  3.  Every 4-6 hours minimum:  Change oxygen saturation probe site  4.  Every 4-6 hours:  If on nasal continuous positive airway pressure, respiratory therapy assess nares and determine need for appliance change or resting period.  Outcome: Progressing     Problem: Chronic Conditions and Co-morbidities  Goal: Patient's chronic conditions and co-morbidity symptoms are monitored and maintained or improved  Outcome: Progressing     
  Problem: Discharge Planning  Goal: Discharge to home or other facility with appropriate resources  Outcome: Progressing     Problem: Safety - Adult  Goal: Free from fall injury  Outcome: Progressing     Problem: Skin/Tissue Integrity  Goal: Absence of new skin breakdown  Description: 1.  Monitor for areas of redness and/or skin breakdown  2.  Assess vascular access sites hourly  3.  Every 4-6 hours minimum:  Change oxygen saturation probe site  4.  Every 4-6 hours:  If on nasal continuous positive airway pressure, respiratory therapy assess nares and determine need for appliance change or resting period.  Outcome: Progressing     Problem: Chronic Conditions and Co-morbidities  Goal: Patient's chronic conditions and co-morbidity symptoms are monitored and maintained or improved  Outcome: Progressing     
Increase function to baseline.    
Increase patients ADLs/functional status to baseline.    
PLAN OF CARE    Received request for inpatient admission. Admitting provider Roland Bowens NP and Dr. Oden notified.    MAXWELL MONTE MD  12/26/2023  9:06 AM    
Adult  Goal: Achieves optimal ventilation and oxygenation  Outcome: Progressing     Problem: Skin/Tissue Integrity - Adult  Goal: Skin integrity remains intact  Outcome: Progressing  Goal: Incisions, wounds, or drain sites healing without S/S of infection  Outcome: Progressing  Goal: Oral mucous membranes remain intact  Outcome: Progressing     Problem: Musculoskeletal - Adult  Goal: Return mobility to safest level of function  Outcome: Progressing  Goal: Maintain proper alignment of affected body part  Outcome: Progressing  Goal: Return ADL status to a safe level of function  Outcome: Progressing     Problem: Gastrointestinal - Adult  Goal: Minimal or absence of nausea and vomiting  Outcome: Progressing  Goal: Maintains or returns to baseline bowel function  Outcome: Progressing  Goal: Maintains adequate nutritional intake  Outcome: Progressing  Goal: Establish and maintain optimal ostomy function  Outcome: Progressing     Problem: Genitourinary - Adult  Goal: Absence of urinary retention  Outcome: Progressing  Goal: Urinary catheter remains patent  Outcome: Progressing     Problem: Infection - Adult  Goal: Absence of infection at discharge  Outcome: Progressing  Goal: Absence of infection during hospitalization  Outcome: Progressing  Goal: Absence of fever/infection during anticipated neutropenic period  Outcome: Progressing     Problem: Metabolic/Fluid and Electrolytes - Adult  Goal: Electrolytes maintained within normal limits  Outcome: Progressing  Goal: Hemodynamic stability and optimal renal function maintained  Outcome: Progressing  Goal: Glucose maintained within prescribed range  Outcome: Progressing     Problem: Hematologic - Adult  Goal: Maintains hematologic stability  Outcome: Progressing     
Maintains or returns to baseline bowel function  Outcome: Progressing  Goal: Maintains adequate nutritional intake  Outcome: Progressing  Goal: Establish and maintain optimal ostomy function  Outcome: Progressing     Problem: Genitourinary - Adult  Goal: Absence of urinary retention  Outcome: Progressing  Goal: Urinary catheter remains patent  Outcome: Progressing     Problem: Infection - Adult  Goal: Absence of infection at discharge  Outcome: Progressing  Goal: Absence of infection during hospitalization  Outcome: Progressing  Goal: Absence of fever/infection during anticipated neutropenic period  Outcome: Progressing     Problem: Metabolic/Fluid and Electrolytes - Adult  Goal: Electrolytes maintained within normal limits  Outcome: Progressing  Goal: Hemodynamic stability and optimal renal function maintained  Outcome: Progressing  Goal: Glucose maintained within prescribed range  Outcome: Progressing     Problem: Hematologic - Adult  Goal: Maintains hematologic stability  Outcome: Progressing     
baseline comfort level  12/28/2023 1733 by Sol Monreal, RN  Outcome: Progressing  12/28/2023 1733 by Sol Monreal, RN  Outcome: Progressing  Flowsheets  Taken 12/28/2023 1630  Verbalizes/displays adequate comfort level or baseline comfort level: Encourage patient to monitor pain and request assistance  Taken 12/28/2023 1130  Verbalizes/displays adequate comfort level or baseline comfort level: Encourage patient to monitor pain and request assistance  Taken 12/28/2023 0750  Verbalizes/displays adequate comfort level or baseline comfort level: Encourage patient to monitor pain and request assistance

## 2024-01-18 ENCOUNTER — APPOINTMENT (OUTPATIENT)
Dept: CT IMAGING | Age: 64
DRG: 204 | End: 2024-01-18
Payer: MEDICAID

## 2024-01-18 ENCOUNTER — APPOINTMENT (OUTPATIENT)
Dept: GENERAL RADIOLOGY | Age: 64
DRG: 204 | End: 2024-01-18
Payer: MEDICAID

## 2024-01-18 ENCOUNTER — HOSPITAL ENCOUNTER (INPATIENT)
Age: 64
LOS: 7 days | Discharge: NURSING, REHAB OR LONG-TERM CARE | DRG: 204 | End: 2024-01-25
Attending: EMERGENCY MEDICINE | Admitting: INTERNAL MEDICINE
Payer: MEDICAID

## 2024-01-18 DIAGNOSIS — N18.9 CHRONIC KIDNEY DISEASE, UNSPECIFIED CKD STAGE: ICD-10-CM

## 2024-01-18 DIAGNOSIS — R55 SYNCOPE AND COLLAPSE: Primary | ICD-10-CM

## 2024-01-18 DIAGNOSIS — N30.00 ACUTE CYSTITIS WITHOUT HEMATURIA: ICD-10-CM

## 2024-01-18 LAB
ALBUMIN SERPL-MCNC: 3.4 G/DL (ref 3.4–5)
ALBUMIN/GLOB SERPL: 0.7 {RATIO} (ref 1.1–2.2)
ALP SERPL-CCNC: 583 U/L (ref 40–129)
ALT SERPL-CCNC: 52 U/L (ref 10–40)
AMPHETAMINES UR QL SCN>1000 NG/ML: ABNORMAL
ANION GAP SERPL CALCULATED.3IONS-SCNC: 10 MMOL/L (ref 3–16)
AST SERPL-CCNC: 24 U/L (ref 15–37)
BACTERIA URNS QL MICRO: ABNORMAL /HPF
BARBITURATES UR QL SCN>200 NG/ML: ABNORMAL
BASE EXCESS BLDV CALC-SCNC: -1.1 MMOL/L (ref -3–3)
BASOPHILS # BLD: 0 K/UL (ref 0–0.2)
BASOPHILS NFR BLD: 0.4 %
BENZODIAZ UR QL SCN>200 NG/ML: ABNORMAL
BETA-HYDROXYBUTYRATE: 0.04 MMOL/L (ref 0–0.27)
BILIRUB SERPL-MCNC: <0.2 MG/DL (ref 0–1)
BILIRUB UR QL STRIP.AUTO: NEGATIVE
BUN SERPL-MCNC: 32 MG/DL (ref 7–20)
CALCIUM SERPL-MCNC: 9.4 MG/DL (ref 8.3–10.6)
CANNABINOIDS UR QL SCN>50 NG/ML: ABNORMAL
CHLORIDE SERPL-SCNC: 103 MMOL/L (ref 99–110)
CLARITY UR: ABNORMAL
CO2 BLDV-SCNC: 27 MMOL/L
CO2 SERPL-SCNC: 26 MMOL/L (ref 21–32)
COCAINE UR QL SCN: ABNORMAL
COHGB MFR BLDV: 2.1 % (ref 0–1.5)
COLOR UR: ABNORMAL
CREAT SERPL-MCNC: 1.7 MG/DL (ref 0.6–1.2)
DEPRECATED RDW RBC AUTO: 15.3 % (ref 12.4–15.4)
DRUG SCREEN COMMENT UR-IMP: ABNORMAL
EKG ATRIAL RATE: 66 BPM
EKG ATRIAL RATE: 72 BPM
EKG DIAGNOSIS: NORMAL
EKG DIAGNOSIS: NORMAL
EKG P AXIS: 42 DEGREES
EKG P-R INTERVAL: 162 MS
EKG Q-T INTERVAL: 378 MS
EKG Q-T INTERVAL: 386 MS
EKG QRS DURATION: 64 MS
EKG QRS DURATION: 70 MS
EKG QTC CALCULATION (BAZETT): 396 MS
EKG QTC CALCULATION (BAZETT): 422 MS
EKG R AXIS: -9 DEGREES
EKG R AXIS: 4 DEGREES
EKG T AXIS: -32 DEGREES
EKG T AXIS: 25 DEGREES
EKG VENTRICULAR RATE: 66 BPM
EKG VENTRICULAR RATE: 72 BPM
EOSINOPHIL # BLD: 0 K/UL (ref 0–0.6)
EOSINOPHIL NFR BLD: 0.6 %
EPI CELLS #/AREA URNS HPF: ABNORMAL /HPF (ref 0–5)
ETHANOLAMINE SERPL-MCNC: NORMAL MG/DL (ref 0–0.08)
FENTANYL SCREEN, URINE: ABNORMAL
GFR SERPLBLD CREATININE-BSD FMLA CKD-EPI: 33 ML/MIN/{1.73_M2}
GLUCOSE BLD-MCNC: 176 MG/DL (ref 70–99)
GLUCOSE BLD-MCNC: 298 MG/DL (ref 70–99)
GLUCOSE SERPL-MCNC: 190 MG/DL (ref 70–99)
GLUCOSE UR STRIP.AUTO-MCNC: >=1000 MG/DL
HCO3 BLDV-SCNC: 25.6 MMOL/L (ref 23–29)
HCT VFR BLD AUTO: 34.2 % (ref 36–48)
HGB BLD-MCNC: 10.7 G/DL (ref 12–16)
HGB UR QL STRIP.AUTO: ABNORMAL
KETONES UR STRIP.AUTO-MCNC: NEGATIVE MG/DL
LACTATE BLDV-SCNC: 0.9 MMOL/L (ref 0.4–1.9)
LACTATE BLDV-SCNC: 2.1 MMOL/L (ref 0.4–1.9)
LEUKOCYTE ESTERASE UR QL STRIP.AUTO: ABNORMAL
LIPASE SERPL-CCNC: 5 U/L (ref 13–60)
LYMPHOCYTES # BLD: 1.4 K/UL (ref 1–5.1)
LYMPHOCYTES NFR BLD: 20.1 %
MCH RBC QN AUTO: 27 PG (ref 26–34)
MCHC RBC AUTO-ENTMCNC: 31.2 G/DL (ref 31–36)
MCV RBC AUTO: 86.5 FL (ref 80–100)
METHADONE UR QL SCN>300 NG/ML: ABNORMAL
METHGB MFR BLDV: 0 %
MONOCYTES # BLD: 0.4 K/UL (ref 0–1.3)
MONOCYTES NFR BLD: 5.3 %
NEUTROPHILS # BLD: 5.1 K/UL (ref 1.7–7.7)
NEUTROPHILS NFR BLD: 73.6 %
NITRITE UR QL STRIP.AUTO: POSITIVE
O2 THERAPY: ABNORMAL
OPIATES UR QL SCN>300 NG/ML: ABNORMAL
OXYCODONE UR QL SCN: POSITIVE
PCO2 BLDV: 51.6 MMHG (ref 40–50)
PCP UR QL SCN>25 NG/ML: ABNORMAL
PERFORMED ON: ABNORMAL
PERFORMED ON: ABNORMAL
PH BLDV: 7.31 [PH] (ref 7.35–7.45)
PH UR STRIP.AUTO: 6.5 [PH] (ref 5–8)
PH UR STRIP: 6.5 [PH]
PLATELET # BLD AUTO: 169 K/UL (ref 135–450)
PMV BLD AUTO: 8.9 FL (ref 5–10.5)
PO2 BLDV: 30.7 MMHG (ref 25–40)
POTASSIUM SERPL-SCNC: 4.1 MMOL/L (ref 3.5–5.1)
PROT SERPL-MCNC: 8.1 G/DL (ref 6.4–8.2)
PROT UR STRIP.AUTO-MCNC: >=300 MG/DL
RBC # BLD AUTO: 3.95 M/UL (ref 4–5.2)
RBC #/AREA URNS HPF: ABNORMAL /HPF (ref 0–4)
SAO2 % BLDV: 57 %
SODIUM SERPL-SCNC: 139 MMOL/L (ref 136–145)
SP GR UR STRIP.AUTO: 1.02 (ref 1–1.03)
SPECIMEN STATUS: NORMAL
TROPONIN, HIGH SENSITIVITY: 52 NG/L (ref 0–14)
TROPONIN, HIGH SENSITIVITY: 57 NG/L (ref 0–14)
UA COMPLETE W REFLEX CULTURE PNL UR: YES
UA DIPSTICK W REFLEX MICRO PNL UR: YES
URN SPEC COLLECT METH UR: ABNORMAL
UROBILINOGEN UR STRIP-ACNC: 0.2 E.U./DL
WBC # BLD AUTO: 6.9 K/UL (ref 4–11)
WBC #/AREA URNS HPF: >100 /HPF (ref 0–5)

## 2024-01-18 PROCEDURE — 81001 URINALYSIS AUTO W/SCOPE: CPT

## 2024-01-18 PROCEDURE — 96374 THER/PROPH/DIAG INJ IV PUSH: CPT

## 2024-01-18 PROCEDURE — 87086 URINE CULTURE/COLONY COUNT: CPT

## 2024-01-18 PROCEDURE — 80307 DRUG TEST PRSMV CHEM ANLYZR: CPT

## 2024-01-18 PROCEDURE — 82077 ASSAY SPEC XCP UR&BREATH IA: CPT

## 2024-01-18 PROCEDURE — 85025 COMPLETE CBC W/AUTO DIFF WBC: CPT

## 2024-01-18 PROCEDURE — 82803 BLOOD GASES ANY COMBINATION: CPT

## 2024-01-18 PROCEDURE — 87186 SC STD MICRODIL/AGAR DIL: CPT

## 2024-01-18 PROCEDURE — 6360000002 HC RX W HCPCS: Performed by: EMERGENCY MEDICINE

## 2024-01-18 PROCEDURE — 83605 ASSAY OF LACTIC ACID: CPT

## 2024-01-18 PROCEDURE — 6370000000 HC RX 637 (ALT 250 FOR IP): Performed by: INTERNAL MEDICINE

## 2024-01-18 PROCEDURE — 82010 KETONE BODYS QUAN: CPT

## 2024-01-18 PROCEDURE — 99285 EMERGENCY DEPT VISIT HI MDM: CPT

## 2024-01-18 PROCEDURE — 2580000003 HC RX 258: Performed by: INTERNAL MEDICINE

## 2024-01-18 PROCEDURE — 2580000003 HC RX 258: Performed by: EMERGENCY MEDICINE

## 2024-01-18 PROCEDURE — 87040 BLOOD CULTURE FOR BACTERIA: CPT

## 2024-01-18 PROCEDURE — 1200000000 HC SEMI PRIVATE

## 2024-01-18 PROCEDURE — 70450 CT HEAD/BRAIN W/O DYE: CPT

## 2024-01-18 PROCEDURE — 80053 COMPREHEN METABOLIC PANEL: CPT

## 2024-01-18 PROCEDURE — 84484 ASSAY OF TROPONIN QUANT: CPT

## 2024-01-18 PROCEDURE — 93005 ELECTROCARDIOGRAM TRACING: CPT | Performed by: EMERGENCY MEDICINE

## 2024-01-18 PROCEDURE — 93010 ELECTROCARDIOGRAM REPORT: CPT | Performed by: INTERNAL MEDICINE

## 2024-01-18 PROCEDURE — 87077 CULTURE AEROBIC IDENTIFY: CPT

## 2024-01-18 PROCEDURE — 6370000000 HC RX 637 (ALT 250 FOR IP): Performed by: EMERGENCY MEDICINE

## 2024-01-18 PROCEDURE — 96361 HYDRATE IV INFUSION ADD-ON: CPT

## 2024-01-18 PROCEDURE — 87088 URINE BACTERIA CULTURE: CPT

## 2024-01-18 PROCEDURE — 83690 ASSAY OF LIPASE: CPT

## 2024-01-18 PROCEDURE — 6360000002 HC RX W HCPCS: Performed by: INTERNAL MEDICINE

## 2024-01-18 PROCEDURE — 71045 X-RAY EXAM CHEST 1 VIEW: CPT

## 2024-01-18 RX ORDER — POLYETHYLENE GLYCOL 3350 17 G/17G
17 POWDER, FOR SOLUTION ORAL DAILY PRN
Status: DISCONTINUED | OUTPATIENT
Start: 2024-01-18 | End: 2024-01-25 | Stop reason: HOSPADM

## 2024-01-18 RX ORDER — ONDANSETRON 4 MG/1
4 TABLET, ORALLY DISINTEGRATING ORAL EVERY 8 HOURS PRN
Status: DISCONTINUED | OUTPATIENT
Start: 2024-01-18 | End: 2024-01-25 | Stop reason: HOSPADM

## 2024-01-18 RX ORDER — SODIUM CHLORIDE 0.9 % (FLUSH) 0.9 %
5-40 SYRINGE (ML) INJECTION PRN
Status: DISCONTINUED | OUTPATIENT
Start: 2024-01-18 | End: 2024-01-25 | Stop reason: HOSPADM

## 2024-01-18 RX ORDER — 0.9 % SODIUM CHLORIDE 0.9 %
1000 INTRAVENOUS SOLUTION INTRAVENOUS ONCE
Status: COMPLETED | OUTPATIENT
Start: 2024-01-18 | End: 2024-01-18

## 2024-01-18 RX ORDER — ATORVASTATIN CALCIUM 40 MG/1
40 TABLET, FILM COATED ORAL NIGHTLY
Status: DISCONTINUED | OUTPATIENT
Start: 2024-01-18 | End: 2024-01-25 | Stop reason: HOSPADM

## 2024-01-18 RX ORDER — GABAPENTIN 100 MG/1
200 CAPSULE ORAL 3 TIMES DAILY
Status: DISCONTINUED | OUTPATIENT
Start: 2024-01-18 | End: 2024-01-25 | Stop reason: HOSPADM

## 2024-01-18 RX ORDER — INSULIN GLARGINE 100 [IU]/ML
15 INJECTION, SOLUTION SUBCUTANEOUS NIGHTLY
Status: DISCONTINUED | OUTPATIENT
Start: 2024-01-18 | End: 2024-01-22

## 2024-01-18 RX ORDER — DEXTROSE MONOHYDRATE 100 MG/ML
INJECTION, SOLUTION INTRAVENOUS CONTINUOUS PRN
Status: DISCONTINUED | OUTPATIENT
Start: 2024-01-18 | End: 2024-01-25 | Stop reason: HOSPADM

## 2024-01-18 RX ORDER — ASPIRIN 81 MG/1
81 TABLET, CHEWABLE ORAL DAILY
Status: DISCONTINUED | OUTPATIENT
Start: 2024-01-19 | End: 2024-01-25 | Stop reason: HOSPADM

## 2024-01-18 RX ORDER — SODIUM CHLORIDE 9 MG/ML
INJECTION, SOLUTION INTRAVENOUS CONTINUOUS
Status: ACTIVE | OUTPATIENT
Start: 2024-01-18 | End: 2024-01-19

## 2024-01-18 RX ORDER — ONDANSETRON 2 MG/ML
4 INJECTION INTRAMUSCULAR; INTRAVENOUS EVERY 6 HOURS PRN
Status: DISCONTINUED | OUTPATIENT
Start: 2024-01-18 | End: 2024-01-25 | Stop reason: HOSPADM

## 2024-01-18 RX ORDER — HEPARIN SODIUM 5000 [USP'U]/ML
5000 INJECTION, SOLUTION INTRAVENOUS; SUBCUTANEOUS 2 TIMES DAILY
Status: DISCONTINUED | OUTPATIENT
Start: 2024-01-18 | End: 2024-01-25 | Stop reason: HOSPADM

## 2024-01-18 RX ORDER — GLUCAGON 1 MG/ML
1 KIT INJECTION PRN
Status: DISCONTINUED | OUTPATIENT
Start: 2024-01-18 | End: 2024-01-25 | Stop reason: HOSPADM

## 2024-01-18 RX ORDER — SODIUM CHLORIDE 9 MG/ML
INJECTION, SOLUTION INTRAVENOUS PRN
Status: DISCONTINUED | OUTPATIENT
Start: 2024-01-18 | End: 2024-01-25 | Stop reason: HOSPADM

## 2024-01-18 RX ORDER — SODIUM CHLORIDE 0.9 % (FLUSH) 0.9 %
5-40 SYRINGE (ML) INJECTION EVERY 12 HOURS SCHEDULED
Status: DISCONTINUED | OUTPATIENT
Start: 2024-01-18 | End: 2024-01-25 | Stop reason: HOSPADM

## 2024-01-18 RX ORDER — PANTOPRAZOLE SODIUM 40 MG/1
40 TABLET, DELAYED RELEASE ORAL DAILY
Status: DISCONTINUED | OUTPATIENT
Start: 2024-01-19 | End: 2024-01-25 | Stop reason: HOSPADM

## 2024-01-18 RX ORDER — ACETAMINOPHEN 325 MG/1
650 TABLET ORAL EVERY 6 HOURS PRN
Status: DISCONTINUED | OUTPATIENT
Start: 2024-01-18 | End: 2024-01-25 | Stop reason: HOSPADM

## 2024-01-18 RX ORDER — NALOXONE HYDROCHLORIDE 1 MG/ML
0.4 INJECTION INTRAMUSCULAR; INTRAVENOUS; SUBCUTANEOUS ONCE
Status: COMPLETED | OUTPATIENT
Start: 2024-01-18 | End: 2024-01-18

## 2024-01-18 RX ORDER — PALIPERIDONE 3 MG/1
9 TABLET, EXTENDED RELEASE ORAL EVERY MORNING
Status: DISCONTINUED | OUTPATIENT
Start: 2024-01-19 | End: 2024-01-25 | Stop reason: HOSPADM

## 2024-01-18 RX ORDER — CIPROFLOXACIN 500 MG/1
500 TABLET, FILM COATED ORAL ONCE
Status: COMPLETED | OUTPATIENT
Start: 2024-01-18 | End: 2024-01-18

## 2024-01-18 RX ORDER — TRAZODONE HYDROCHLORIDE 50 MG/1
200 TABLET ORAL NIGHTLY
Status: DISCONTINUED | OUTPATIENT
Start: 2024-01-18 | End: 2024-01-25 | Stop reason: HOSPADM

## 2024-01-18 RX ORDER — ACETAMINOPHEN 650 MG/1
650 SUPPOSITORY RECTAL EVERY 6 HOURS PRN
Status: DISCONTINUED | OUTPATIENT
Start: 2024-01-18 | End: 2024-01-25 | Stop reason: HOSPADM

## 2024-01-18 RX ORDER — AMLODIPINE BESYLATE 5 MG/1
10 TABLET ORAL DAILY
Status: DISCONTINUED | OUTPATIENT
Start: 2024-01-19 | End: 2024-01-25 | Stop reason: HOSPADM

## 2024-01-18 RX ADMIN — ATORVASTATIN CALCIUM 40 MG: 40 TABLET, FILM COATED ORAL at 20:26

## 2024-01-18 RX ADMIN — SODIUM CHLORIDE 1000 ML: 9 INJECTION, SOLUTION INTRAVENOUS at 14:08

## 2024-01-18 RX ADMIN — GABAPENTIN 200 MG: 100 CAPSULE ORAL at 20:27

## 2024-01-18 RX ADMIN — SODIUM CHLORIDE, PRESERVATIVE FREE 10 ML: 5 INJECTION INTRAVENOUS at 20:16

## 2024-01-18 RX ADMIN — CIPROFLOXACIN HYDROCHLORIDE 500 MG: 500 TABLET, FILM COATED ORAL at 16:57

## 2024-01-18 RX ADMIN — TRAZODONE HYDROCHLORIDE 200 MG: 50 TABLET ORAL at 20:27

## 2024-01-18 RX ADMIN — NALOXONE HYDROCHLORIDE 0.4 MG: 1 INJECTION PARENTERAL at 14:10

## 2024-01-18 RX ADMIN — INSULIN GLARGINE 15 UNITS: 100 INJECTION, SOLUTION SUBCUTANEOUS at 20:28

## 2024-01-18 RX ADMIN — SODIUM CHLORIDE: 9 INJECTION, SOLUTION INTRAVENOUS at 21:16

## 2024-01-18 RX ADMIN — HEPARIN SODIUM 5000 UNITS: 5000 INJECTION INTRAVENOUS; SUBCUTANEOUS at 21:18

## 2024-01-18 RX ADMIN — FIDAXOMICIN 200 MG: 200 TABLET, FILM COATED ORAL at 20:26

## 2024-01-18 ASSESSMENT — LIFESTYLE VARIABLES
HOW MANY STANDARD DRINKS CONTAINING ALCOHOL DO YOU HAVE ON A TYPICAL DAY: PATIENT DOES NOT DRINK
HOW OFTEN DO YOU HAVE A DRINK CONTAINING ALCOHOL: NEVER

## 2024-01-18 ASSESSMENT — PAIN - FUNCTIONAL ASSESSMENT: PAIN_FUNCTIONAL_ASSESSMENT: 0-10

## 2024-01-18 ASSESSMENT — PAIN SCALES - GENERAL: PAINLEVEL_OUTOF10: 0

## 2024-01-18 NOTE — H&P
Hepatic:   Recent Labs     01/18/24  1430   AST 24   ALT 52*   BILITOT <0.2   ALKPHOS 583*     Lipids:   Lab Results   Component Value Date/Time    CHOL 154 11/27/2023 07:00 PM    HDL 75 11/27/2023 07:00 PM    TRIG 89 11/27/2023 07:00 PM     Hemoglobin A1C:   Lab Results   Component Value Date/Time    LABA1C 5.8 12/29/2023 06:11 AM     TSH:   Lab Results   Component Value Date/Time    TSH 0.75 02/16/2022 07:05 AM     Troponin: No results found for: \"TROPONINT\"  Lactic Acid: No results for input(s): \"LACTA\" in the last 72 hours.  BNP: No results for input(s): \"PROBNP\" in the last 72 hours.  UA:  Lab Results   Component Value Date/Time    NITRU POSITIVE 01/18/2024 01:42 PM    COLORU Straw 01/18/2024 01:42 PM    PHUR 6.5 01/18/2024 01:42 PM    WBCUA >100 01/18/2024 01:42 PM    RBCUA 3-4 01/18/2024 01:42 PM    YEAST Present 12/10/2023 02:53 PM    BACTERIA 3+ 01/18/2024 01:42 PM    CLARITYU CLOUDY 01/18/2024 01:42 PM    SPECGRAV 1.025 01/18/2024 01:42 PM    LEUKOCYTESUR SMALL 01/18/2024 01:42 PM    UROBILINOGEN 0.2 01/18/2024 01:42 PM    BILIRUBINUR Negative 01/18/2024 01:42 PM    BILIRUBINUR negative 01/06/2013 12:11 AM    BILIRUBINUR Negative 06/07/2010 03:38 PM    BLOODU MODERATE 01/18/2024 01:42 PM    GLUCOSEU >=1000 01/18/2024 01:42 PM    GLUCOSEU >=1000 mg/dL 06/07/2010 03:38 PM    KETUA Negative 01/18/2024 01:42 PM    AMORPHOUS 1+ 12/10/2023 02:53 PM     Urine Cultures:   Lab Results   Component Value Date/Time    LABURIN >100,000 CFU/ml 11/30/2023 02:47 PM     Blood Cultures:   Lab Results   Component Value Date/Time    BC No Growth after 4 days of incubation. 12/10/2023 01:15 PM     Lab Results   Component Value Date/Time    BLOODCULT2 No Growth after 4 days of incubation. 12/10/2023 01:15 PM     Organism:   Lab Results   Component Value Date/Time    ORG Staphylococcus lugdunensis 11/30/2023 02:47 PM       Imaging/Diagnostics Last 24 Hours   CT HEAD WO CONTRAST    Result Date: 1/18/2024  EXAMINATION: CT OF

## 2024-01-18 NOTE — ED PROVIDER NOTES
Emergency Department Provider Note  Location: [facility]  ED  1/18/2024     Patient Identification  Agustina Fried is a 63 y.o. female    Chief Complaint  Altered Mental Status (Patient found face down by family member, attempted to move her and she lost control of her bowels and vomited, was minimally responsive but able to communicate with EMS, hx of diabetes, no hx of seizures)          HPI  (History provided by patient and EMS)    The patient is a pleasant 63 Y F with a h/o former smoking, HTN, HLD, DM2, CAD s/p stenting, CHF, CKD3, CVA, bipolar affective disorder, and anxiety. 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  Patient presents by EMS after being found down and altered by family member.  She was seen 20 minutes prior by the nursing service.  Patient was helped up by family member but then defecated on herself and was lowered back to the floor.  EMS reports that she was sluggish and lethargic on arrival tended to perk up and rude.  She is now answering questions and is oriented.  I was asked to evaluate this patient for a stroke eval at triage station.  She has no unilateral findings and is oriented.  NIH stroke score of 0.      Nursing Notes were all reviewed and agreed with, or any disagreements were addressed in the HPI:  Allergies:   Allergies   Allergen Reactions    Morphine Anaphylaxis and Hives     feels like throat is closing    Penicillins Hives and Swelling    Codeine Hives and Rash    Penicillin G Rash       Past medical history:  has a past medical history of Abnormal brain MRI (7/20/2017), Acute bilateral low back pain without sciatica (11/2/2016), SHARRON (acute kidney injury) ([facility]) (7/5/2017), Arthritis, Bipolar disorder ([facility]) (10/18/2008), CAD (coronary artery disease), Cancer (Former[facility]) (2015), Carotid stenosis, bilateral:<50%:per US 7/2016 (7/15/2016), Carpal tunnel syndrome

## 2024-01-19 ENCOUNTER — APPOINTMENT (OUTPATIENT)
Dept: VASCULAR LAB | Age: 64
DRG: 204 | End: 2024-01-19
Payer: MEDICAID

## 2024-01-19 ENCOUNTER — TELEPHONE (OUTPATIENT)
Dept: CARDIOLOGY | Age: 64
End: 2024-01-19

## 2024-01-19 PROBLEM — A04.72 C. DIFFICILE COLITIS: Status: ACTIVE | Noted: 2024-01-19

## 2024-01-19 PROBLEM — N18.9 CHRONIC KIDNEY DISEASE: Status: ACTIVE | Noted: 2024-01-19

## 2024-01-19 LAB
ALBUMIN SERPL-MCNC: 3.2 G/DL (ref 3.4–5)
ALP SERPL-CCNC: 510 U/L (ref 40–129)
ALT SERPL-CCNC: 39 U/L (ref 10–40)
ANION GAP SERPL CALCULATED.3IONS-SCNC: 7 MMOL/L (ref 3–16)
AST SERPL-CCNC: 22 U/L (ref 15–37)
BASOPHILS # BLD: 0 K/UL (ref 0–0.2)
BASOPHILS NFR BLD: 0.4 %
BILIRUB DIRECT SERPL-MCNC: <0.2 MG/DL (ref 0–0.3)
BILIRUB INDIRECT SERPL-MCNC: ABNORMAL MG/DL (ref 0–1)
BILIRUB SERPL-MCNC: <0.2 MG/DL (ref 0–1)
BUN SERPL-MCNC: 29 MG/DL (ref 7–20)
CALCIUM SERPL-MCNC: 9 MG/DL (ref 8.3–10.6)
CHLORIDE SERPL-SCNC: 110 MMOL/L (ref 99–110)
CO2 SERPL-SCNC: 24 MMOL/L (ref 21–32)
CREAT SERPL-MCNC: 1.7 MG/DL (ref 0.6–1.2)
DEPRECATED RDW RBC AUTO: 15.6 % (ref 12.4–15.4)
EKG ATRIAL RATE: 61 BPM
EKG DIAGNOSIS: NORMAL
EKG P AXIS: 36 DEGREES
EKG P-R INTERVAL: 156 MS
EKG Q-T INTERVAL: 430 MS
EKG QRS DURATION: 86 MS
EKG QTC CALCULATION (BAZETT): 432 MS
EKG R AXIS: -14 DEGREES
EKG T AXIS: 44 DEGREES
EKG VENTRICULAR RATE: 61 BPM
EOSINOPHIL # BLD: 0.1 K/UL (ref 0–0.6)
EOSINOPHIL NFR BLD: 0.9 %
GFR SERPLBLD CREATININE-BSD FMLA CKD-EPI: 33 ML/MIN/{1.73_M2}
GLUCOSE BLD-MCNC: 213 MG/DL (ref 70–99)
GLUCOSE BLD-MCNC: 333 MG/DL (ref 70–99)
GLUCOSE BLD-MCNC: 430 MG/DL (ref 70–99)
GLUCOSE BLD-MCNC: 544 MG/DL (ref 70–99)
GLUCOSE SERPL-MCNC: 197 MG/DL (ref 70–99)
HCT VFR BLD AUTO: 35.4 % (ref 36–48)
HGB BLD-MCNC: 10.9 G/DL (ref 12–16)
LYMPHOCYTES # BLD: 2.3 K/UL (ref 1–5.1)
LYMPHOCYTES NFR BLD: 32.9 %
MAGNESIUM SERPL-MCNC: 2.1 MG/DL (ref 1.8–2.4)
MCH RBC QN AUTO: 26.7 PG (ref 26–34)
MCHC RBC AUTO-ENTMCNC: 30.9 G/DL (ref 31–36)
MCV RBC AUTO: 86.6 FL (ref 80–100)
MONOCYTES # BLD: 0.3 K/UL (ref 0–1.3)
MONOCYTES NFR BLD: 3.7 %
NEUTROPHILS # BLD: 4.4 K/UL (ref 1.7–7.7)
NEUTROPHILS NFR BLD: 62.1 %
PERFORMED ON: ABNORMAL
PHOSPHATE SERPL-MCNC: 3 MG/DL (ref 2.5–4.9)
PLATELET # BLD AUTO: 197 K/UL (ref 135–450)
PMV BLD AUTO: 9.1 FL (ref 5–10.5)
POTASSIUM SERPL-SCNC: 4.7 MMOL/L (ref 3.5–5.1)
PROT SERPL-MCNC: 7.8 G/DL (ref 6.4–8.2)
RBC # BLD AUTO: 4.09 M/UL (ref 4–5.2)
SODIUM SERPL-SCNC: 141 MMOL/L (ref 136–145)
WBC # BLD AUTO: 7 K/UL (ref 4–11)

## 2024-01-19 PROCEDURE — 6370000000 HC RX 637 (ALT 250 FOR IP): Performed by: NURSE PRACTITIONER

## 2024-01-19 PROCEDURE — 6370000000 HC RX 637 (ALT 250 FOR IP): Performed by: INTERNAL MEDICINE

## 2024-01-19 PROCEDURE — 84100 ASSAY OF PHOSPHORUS: CPT

## 2024-01-19 PROCEDURE — 80048 BASIC METABOLIC PNL TOTAL CA: CPT

## 2024-01-19 PROCEDURE — 85025 COMPLETE CBC W/AUTO DIFF WBC: CPT

## 2024-01-19 PROCEDURE — 80076 HEPATIC FUNCTION PANEL: CPT

## 2024-01-19 PROCEDURE — 36415 COLL VENOUS BLD VENIPUNCTURE: CPT

## 2024-01-19 PROCEDURE — 99223 1ST HOSP IP/OBS HIGH 75: CPT | Performed by: NURSE PRACTITIONER

## 2024-01-19 PROCEDURE — 83735 ASSAY OF MAGNESIUM: CPT

## 2024-01-19 PROCEDURE — 2580000003 HC RX 258: Performed by: INTERNAL MEDICINE

## 2024-01-19 PROCEDURE — 99222 1ST HOSP IP/OBS MODERATE 55: CPT | Performed by: INTERNAL MEDICINE

## 2024-01-19 PROCEDURE — 6360000002 HC RX W HCPCS: Performed by: INTERNAL MEDICINE

## 2024-01-19 PROCEDURE — 1200000000 HC SEMI PRIVATE

## 2024-01-19 PROCEDURE — 93005 ELECTROCARDIOGRAM TRACING: CPT | Performed by: INTERNAL MEDICINE

## 2024-01-19 PROCEDURE — 93010 ELECTROCARDIOGRAM REPORT: CPT | Performed by: INTERNAL MEDICINE

## 2024-01-19 PROCEDURE — 95816 EEG AWAKE AND DROWSY: CPT | Performed by: PSYCHIATRY & NEUROLOGY

## 2024-01-19 PROCEDURE — 93880 EXTRACRANIAL BILAT STUDY: CPT

## 2024-01-19 PROCEDURE — 95819 EEG AWAKE AND ASLEEP: CPT

## 2024-01-19 PROCEDURE — 93306 TTE W/DOPPLER COMPLETE: CPT

## 2024-01-19 RX ORDER — INSULIN LISPRO 100 [IU]/ML
0-4 INJECTION, SOLUTION INTRAVENOUS; SUBCUTANEOUS NIGHTLY
Status: DISCONTINUED | OUTPATIENT
Start: 2024-01-19 | End: 2024-01-25 | Stop reason: HOSPADM

## 2024-01-19 RX ORDER — OXYCODONE HYDROCHLORIDE AND ACETAMINOPHEN 5; 325 MG/1; MG/1
1 TABLET ORAL EVERY 4 HOURS PRN
Status: DISCONTINUED | OUTPATIENT
Start: 2024-01-19 | End: 2024-01-25 | Stop reason: HOSPADM

## 2024-01-19 RX ORDER — INSULIN LISPRO 100 [IU]/ML
0-8 INJECTION, SOLUTION INTRAVENOUS; SUBCUTANEOUS
Status: DISCONTINUED | OUTPATIENT
Start: 2024-01-19 | End: 2024-01-25 | Stop reason: HOSPADM

## 2024-01-19 RX ADMIN — GABAPENTIN 200 MG: 100 CAPSULE ORAL at 15:13

## 2024-01-19 RX ADMIN — PALIPERIDONE 9 MG: 3 TABLET, EXTENDED RELEASE ORAL at 08:23

## 2024-01-19 RX ADMIN — GABAPENTIN 200 MG: 100 CAPSULE ORAL at 21:29

## 2024-01-19 RX ADMIN — INSULIN LISPRO 4 UNITS: 100 INJECTION, SOLUTION INTRAVENOUS; SUBCUTANEOUS at 21:29

## 2024-01-19 RX ADMIN — OXYCODONE AND ACETAMINOPHEN 1 TABLET: 5; 325 TABLET ORAL at 16:42

## 2024-01-19 RX ADMIN — INSULIN LISPRO 8 UNITS: 100 INJECTION, SOLUTION INTRAVENOUS; SUBCUTANEOUS at 18:11

## 2024-01-19 RX ADMIN — CEFTRIAXONE SODIUM 1000 MG: 1 INJECTION, POWDER, FOR SOLUTION INTRAMUSCULAR; INTRAVENOUS at 05:47

## 2024-01-19 RX ADMIN — ATORVASTATIN CALCIUM 40 MG: 40 TABLET, FILM COATED ORAL at 21:28

## 2024-01-19 RX ADMIN — INSULIN HUMAN 10 UNITS: 100 INJECTION, SOLUTION PARENTERAL at 21:44

## 2024-01-19 RX ADMIN — INSULIN GLARGINE 15 UNITS: 100 INJECTION, SOLUTION SUBCUTANEOUS at 21:30

## 2024-01-19 RX ADMIN — TRAZODONE HYDROCHLORIDE 200 MG: 50 TABLET ORAL at 21:28

## 2024-01-19 RX ADMIN — HEPARIN SODIUM 5000 UNITS: 5000 INJECTION INTRAVENOUS; SUBCUTANEOUS at 21:29

## 2024-01-19 RX ADMIN — AMLODIPINE BESYLATE 10 MG: 5 TABLET ORAL at 08:22

## 2024-01-19 RX ADMIN — SODIUM CHLORIDE: 9 INJECTION, SOLUTION INTRAVENOUS at 07:34

## 2024-01-19 RX ADMIN — SODIUM CHLORIDE: 9 INJECTION, SOLUTION INTRAVENOUS at 18:44

## 2024-01-19 RX ADMIN — FIDAXOMICIN 200 MG: 200 TABLET, FILM COATED ORAL at 08:23

## 2024-01-19 RX ADMIN — ASPIRIN 81 MG 81 MG: 81 TABLET ORAL at 08:23

## 2024-01-19 RX ADMIN — FIDAXOMICIN 200 MG: 200 TABLET, FILM COATED ORAL at 21:29

## 2024-01-19 RX ADMIN — GABAPENTIN 200 MG: 100 CAPSULE ORAL at 08:22

## 2024-01-19 RX ADMIN — HEPARIN SODIUM 5000 UNITS: 5000 INJECTION INTRAVENOUS; SUBCUTANEOUS at 08:23

## 2024-01-19 RX ADMIN — PANTOPRAZOLE SODIUM 40 MG: 40 TABLET, DELAYED RELEASE ORAL at 08:23

## 2024-01-19 ASSESSMENT — PAIN DESCRIPTION - DESCRIPTORS
DESCRIPTORS: ACHING
DESCRIPTORS: ACHING

## 2024-01-19 ASSESSMENT — PAIN - FUNCTIONAL ASSESSMENT: PAIN_FUNCTIONAL_ASSESSMENT: ACTIVITIES ARE NOT PREVENTED

## 2024-01-19 ASSESSMENT — PAIN SCALES - GENERAL
PAINLEVEL_OUTOF10: 7
PAINLEVEL_OUTOF10: 7

## 2024-01-19 ASSESSMENT — PAIN DESCRIPTION - ORIENTATION
ORIENTATION: LOWER
ORIENTATION: LOWER

## 2024-01-19 ASSESSMENT — PAIN DESCRIPTION - LOCATION
LOCATION: BACK
LOCATION: BACK

## 2024-01-19 NOTE — PLAN OF CARE
Problem: Discharge Planning  Goal: Discharge to home or other facility with appropriate resources  Outcome: Progressing  Flowsheets (Taken 1/18/2024 1930)  Discharge to home or other facility with appropriate resources: Identify barriers to discharge with patient and caregiver     Problem: Pain  Goal: Verbalizes/displays adequate comfort level or baseline comfort level  Outcome: Progressing     Problem: Safety - Adult  Goal: Free from fall injury  Outcome: Progressing

## 2024-01-19 NOTE — CARE COORDINATION
Case Management Assessment  Initial Evaluation    Date/Time of Evaluation: 1/19/2024 11:29 AM  Assessment Completed by: Katharina Garcia RN    If patient is discharged prior to next notation, then this note serves as note for discharge by case management.    Patient Name: Agustina Fried                   YOB: 1960  Diagnosis: Syncope and collapse [R55]  Acute cystitis without hematuria [N30.00]  Chronic kidney disease, unspecified CKD stage [N18.9]                   Date / Time: 1/18/2024  1:08 PM    Patient Admission Status: Inpatient   Readmission Risk (Low < 19, Mod (19-27), High > 27): Readmission Risk Score: 30.2    Current PCP: Viridiana Blanco, SARTHAK - NP  PCP verified by CM? Yes    Chart Reviewed: Yes      History Provided by: Patient  Patient Orientation: Alert and Oriented, Person, Place, Situation    Patient Cognition: Alert    Hospitalization in the last 30 days (Readmission):  Yes    If yes, Readmission Assessment in  Navigator will be completed.    Advance Directives:      Code Status: Full Code   Patient's Primary Decision Maker is: Legal Next of Kin (Rico)    Primary Decision Maker: Maurice Fried - Child - [phone]    Secondary Decision Maker: Moi Fried - Brother/Sister - [phone]    Secondary Decision Maker: Marlyn Fried - Daughter-in-Law - [phone]    Supplemental (Other) Decision Maker: PEMA DIANE - Other - [phone]    Discharge Planning:    Patient lives with: Alone Type of Home: Apartment  Primary Care Giver: Self  Patient Support Systems include: Family Members   Current Financial resources: Medicaid  Current community resources: None  Current services prior to admission: Durable Medical Equipment            Current DME: Walker, Cane            Type of Home Care services:  None    ADLS  Prior functional level: Assistance with the following:, Mobility  Current functional level: Assistance with the following:, Mobility    PT AM-PAC:   /24  OT AM-PAC:

## 2024-01-19 NOTE — ACP (ADVANCE CARE PLANNING)
Advance Care Planning     General Advance Care Planning (ACP) Conversation    Date of Conversation: 1/18/2024  Conducted with: Patient with Decision Making Capacity   Healthcare Decision Maker: Next of Kin by law (only applies in absence of above) (name) Maurice    Healthcare Decision Maker:    Primary Decision Maker: Maurice Fried - Child - [phone]    Secondary Decision Maker: Moi Fried - Brother/Sister - [phone]    Secondary Decision Maker: Marlyn Fried - Daughter-in-Law - [phone]    Supplemental (Other) Decision Maker: PEMA DIANE - Other - [phone]  Click here to complete Healthcare Decision Makers including selection of the Healthcare Decision Maker Relationship (ie \"Primary\").  Today we documented Decision Maker(s) consistent with Legal Next of Kin hierarchy.    Content/Action Overview:  Has ACP document(s) on file - reflects the patient's care preferences  Reviewed DNR/DNI and patient elects Full Code (Attempt Resuscitation)      Length of Voluntary ACP Conversation in minutes:  <16 minutes (Non-Billable)    Katharina Garcia RN

## 2024-01-19 NOTE — TELEPHONE ENCOUNTER
Monitor company [employer]  Length of monitor 14 days  Monitor ordered by Dr. Jose De La Vega   Patch ID [id number]  Device number [id number]  Monitor given to Sandra Golden  Nurse to apply at the time of discharge.

## 2024-01-19 NOTE — CONSULTS
In patient Neurology consult        [facility] Neurology      MD Agustina Moulton  1960    Date of Service: 1/19/2024    Referring Physician: Rubi Atwood MD      Reason for the consult and CC: Syncope and collapse    HPI:   The patient is a 63 y.o.   female, with a PMH of HTN, HLD, DM2, CKD, remote CVA, presented to [facility] following a syncopal episode.  The patient was talking on the phone and bent over to reach for something.  She remembers feeling dizzy at that time and the next thing she knew, she was on the ground.  Her friend was there and she remembers her friend yelling at her to wake up.  She did vomit and defecate.  She remembers feeling tired and groogy after the event.  No witnessed seizure-like activity.  No tongue biting.  No history of seizures.  Today she feels much better.  She does complain of feeling weak in her legs, left > right.         Constitutional:   Vitals:    01/18/24 1930 01/19/24 0000 01/19/24 0400 01/19/24 0820   BP: (!) 150/68 (!) 154/57 (!) 151/61 (!) 140/59   Pulse: 62 61 64 58   Resp: 15 16 17 16   Temp: 98.1 °F (36.7 °C) 98 °F (36.7 °C) 98.1 °F (36.7 °C) 98.3 °F (36.8 °C)   TempSrc: Oral Axillary Axillary Axillary   SpO2: 98%  95% 96%   Weight:       Height:             I personally reviewed and updated social history, past medical history, medications, allergy, surgical history, and family history as documented in the patient's electronic health records.       ROS: 10-14 ROS reviewed with the patient/nurse/family which were unremarkable except mentioned in H&P.    General appearance:  Normal development and appear in no acute distress.   Mental Status:   Oriented to person, place, problem, and time.    Memory: Good immediate recall.  Intact remote memory  Normal attention span and concentration.  Language: intact naming, repeating and fluency   Good fund of Knowledge. Aware of current events and vocabulary   Cranial Nerves:   II: Visual fields: Full. 
 [facility] Nurse  Consult Note       NAME:  Agustina Fried  MEDICAL RECORD NUMBER:  [id number]  AGE: 63 y.o.   GENDER: female  : 1960  TODAY'S DATE:  2024    Subjective   Reason for WOCN Evaluation and Assessment: DTI in coccyx and unstageable pressure injury of left foot      Agustina Fried is a 63 y.o. female referred by:   [] Physician  [x] Nursing  [] Other:     Wound Identification:  Wound Type:  callous and shearing/ITD  Contributing Factors: diabetes, poor glucose control, chronic pressure, and shear force    Wound History:   H&P from Rubi Atwood MD: Agustina Fried is a 63 y.o. female with pmh of hypertension, hyperlipidemia, insulin-dependent diabetes mellitus, CAD, CHF, CKD stage III, CVA, anxiety/depression, solitary kidney, colonic adenocarcinoma s/p right hemicolectomy, who presents with syncope and collapse.  Patient was reportedly found down by family member-facedown, attempted to move her, she lost control of her bowels, vomited, minimally responsive.  Patient seen and evaluated in the ED-reports that she was talking over the phone, then bent down to reach for something then she does not remember anything.  It was not witnessed.  Last well-known time was 20 minutes prior to the event-patient was seen by nursing service.  Patient does not report any prior history of seizure disorder or similar events.  Does not report having any chest pain or heaviness prior to the episode.  Does not have any weakness or numbness.  But patient is significantly fatigued/drowsy but answering questions appropriately.     According to patient she has had the spots on her feet for a while. She is seeing a podiatrist, but hasn't recently as she was in a SNF. She was unaware of any wounds on her buttocks.     Current Wound Care Treatment:  foam    Patient Goal of Care:  [x] Wound Healing  [] Odor Control  [] Palliative Care  [] Pain Control   [] Other:         PAST MEDICAL HISTORY      
Consult Placed     Who: CARDIOLOGY, [facility]   Date:01/19/2024  Time:0736     Electronically signed by Juan Francisco Watts on 1/19/2024 at 7:36 AM    
Consult Placed     Who: HEMANT PARKER   Date:01/19/24  Time:0820     Electronically signed by Juan Francisco Watts on 1/19/2024 at 8:19 AM     
Consult Placed     Who: JET OG   Date:01/19/24  Time:0902     Electronically signed by Juan Francisco Watts on 1/19/2024 at 9:02 AM    
bilateral    Hypertensive retinopathy, bilateral    Vitreous degeneration, bilateral    Acute bilateral low back pain with left-sided sciatica    History of CVA (cerebrovascular accident) without residual deficits    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 stage 3 chronic kidney disease (HCC)    S/P mastectomy, right    Acute cystitis without hematuria    Hypomagnesemia    Chest pain    CAD S/P percutaneous coronary angioplasty    Hyperglycemia due to type 2 diabetes mellitus (HCC)    Hx of heart artery stent    Nuclear senile cataract    Unintentional weight loss    Chronic anemia    Facial abscess    Asymptomatic bacteriuria    Acute encephalopathy    Tobacco use disorder    Severe malnutrition (HCC)    Acute right eye pain    Suspected condition    Bipolar affective disorder, currently depressed, moderate (HCC)    Seasonal allergies    Symptomatic bradycardia    Dyspnea    Diffuse pain    Hypoglycemia    Acquired absence of other specified parts of digestive tract    Atherosclerotic heart disease of native coronary artery without angina pectoris    Cognitive communication deficit    Hypertensive heart and chronic kidney disease with heart failure and stage 1 through stage 4 chronic kidney disease, or unspecified chronic kidney disease (HCC)    Muscle weakness (generalized)    Occlusion and stenosis of bilateral carotid arteries    Other lack of coordination    Repeated falls    Unspecified abnormalities of gait and mobility    Acute diarrhea    Hypertensive urgency    Acute renal failure superimposed on chronic kidney disease, unspecified acute renal failure type, unspecified CKD stage (HCC)    Atypical chest pain    Confusion     Plan:     Recurrent C dif:  - C dif toxin ag: positive on 12/27/23, 11/3/23 and 9/3/23  - patient was treated in the past with po vanco, now on fidaxomicin  - pending repeat c dif testing  - continues to have diarrhea, may be an issue 
Acquired absence of other specified parts of digestive tract    Atherosclerotic heart disease of native coronary artery without angina pectoris    Cognitive communication deficit    Hypertensive heart and chronic kidney disease with heart failure and stage 1 through stage 4 chronic kidney disease, or unspecified chronic kidney disease (HCC)    Muscle weakness (generalized)    Occlusion and stenosis of bilateral carotid arteries    Other lack of coordination    Repeated falls    Unspecified abnormalities of gait and mobility    Acute diarrhea    Hypertensive urgency    Acute renal failure superimposed on chronic kidney disease, unspecified acute renal failure type, unspecified CKD stage (HCC)    Atypical chest pain    Confusion       Elevated troponin, likely supply/manage medical management, no plans for additional ischemia evaluation, patient had 2 recent stress tests and both were reassuring, program today was reassuring as well.  No plan for cardiac catheterization    Possible LOC, as biomarkers are stable and w/ nml ef, will plan event monitor at [place] for further eval of this along with outpt f/u.       CAD/ASHD, continue aspirin     Hypertension, continue calcium channel blocker, no beta-blocker due to history of bradycardia.  Hemodynamics are reasonable.  Continue current meds.      Hypercholesterolemia, continue statin     Diabetes, anemia, renal insuffic, as per primary service     No further inpatient cardiac workup or treatment planned, will sign off, please call with questions.           Thank you for allowing us to participate in the care of Agustina Fried. Please call me with any questions [phone].    Jose De La Vega MD, [facility]   Interventional Cardiologist  [facility]  [phone] [place] Office  [phone] [place] Office  1/19/2024 1:03 PM

## 2024-01-19 NOTE — PROCEDURES
Patient: Agustina Fried    MR Number: [id number]  YOB: 1960  Date of Visit: 1/19/2024    Clinical History:  The patient is a 63 y.o. years old female with new onset syncopal versus seizure      Method:  The EEG was performed utilizing the international 10/20 of electrode placements of both referential and bipolar montages. The patient was awake and drowsy through out the recording.  Photic stimulation was performed.    Findings:  The background of the EEG showed normal alpha posterior background of 9-10  HZ and amplitude of 20-40 UV. This background was symmetric, waxing and waning, and reactive with eye opening and closure. As the patient became drowsy, generalized diffuse slowing was seen through recording at 6-7 HZ.  This generalized slowing was symmetric, non rhythmical, and continuous. No spike or sharp waves were seen.  Photic stimulation did not activate EEG.     Impression:  This EEG  is within normal limits. There is no evidence of epileptiform discharges, focal, or lateralizing abnormalities.      Arlin Head MD      Board certified in clinical neurophysiology

## 2024-01-19 NOTE — DISCHARGE INSTRUCTIONS
[employer] MONITOR PATCH PATIENT INSTRUCTIONS    Remove Patch in 1 week from application.  Recalibrate and apply next patch.  Call  [employer] CUSTOMER SUPPORT: [phone] and they will assist if needed.    o PIN: [id number]    o Keep the phone with you as much as possible. Be sure to charge the phone overnight and when the battery gets low. If the phone dies completely, be sure to restart the phone and enter the PIN number to confirm the patch is connected to your phone.    o If you are away from the phone for more than 8 hours, please stay within a 30 foot range of the phone for 3 hours to ensure your data fully uploads.    o If you go somewhere with no cellphone service, still keep the phone with you and charged. Your data will upload when you reach cellphone service. You can stay outside of cellphone range for up to 10 days.    o This phone cannot make calls, take pictures, etc. It is solely for medical use and will only Bluetooth connect to your patch.    · Patch Instructions:    o Wait to exercise or shower for 30 minutes after application.    o Shower with the water stream to your back and avoid putting the patch directly under the water stream.    o Do not bathe, swim or fully submerge the patch.    o If the patch starts to lift off after showering or sweating, dry the patch with a towel and allow the adhesive to dry. It should re-adhere to your skin. If it continues to lift, use the adhesive overlay to re-adhere the patch. Video instructions for the adhesive overlay are on the phone under the Menu Tab in the right hand corner - “Help- [employer] Adhesive Overlay”    o One patch lasts for up to 7 days. If you need to wear an additional patch or replace a patch, utilize the included application instructions or the video instructions - “Menu - Help - [employer] Application”    · End of Wear - Returning the Phone    o You are responsible for returning the phone. You will be financially responsible for an

## 2024-01-20 ENCOUNTER — APPOINTMENT (OUTPATIENT)
Dept: ULTRASOUND IMAGING | Age: 64
DRG: 204 | End: 2024-01-20
Payer: MEDICAID

## 2024-01-20 PROBLEM — A41.9 SEPTICEMIA (HCC): Status: ACTIVE | Noted: 2024-01-20

## 2024-01-20 PROBLEM — E13.8 DM (DIABETES MELLITUS), SECONDARY, WITH COMPLICATIONS (HCC): Status: ACTIVE | Noted: 2024-01-20

## 2024-01-20 PROBLEM — G93.41 ENCEPHALOPATHY, METABOLIC: Status: ACTIVE | Noted: 2024-01-20

## 2024-01-20 LAB
ALBUMIN SERPL-MCNC: 3 G/DL (ref 3.4–5)
ALP SERPL-CCNC: 470 U/L (ref 40–129)
ALT SERPL-CCNC: 38 U/L (ref 10–40)
ANION GAP SERPL CALCULATED.3IONS-SCNC: 9 MMOL/L (ref 3–16)
AST SERPL-CCNC: 28 U/L (ref 15–37)
BACTERIA UR CULT: ABNORMAL
BASOPHILS # BLD: 0.1 K/UL (ref 0–0.2)
BASOPHILS NFR BLD: 1.1 %
BILIRUB DIRECT SERPL-MCNC: <0.2 MG/DL (ref 0–0.3)
BILIRUB INDIRECT SERPL-MCNC: ABNORMAL MG/DL (ref 0–1)
BILIRUB SERPL-MCNC: <0.2 MG/DL (ref 0–1)
BUN SERPL-MCNC: 22 MG/DL (ref 7–20)
CALCIUM SERPL-MCNC: 8.8 MG/DL (ref 8.3–10.6)
CHLORIDE SERPL-SCNC: 105 MMOL/L (ref 99–110)
CO2 SERPL-SCNC: 22 MMOL/L (ref 21–32)
CREAT SERPL-MCNC: 1.5 MG/DL (ref 0.6–1.2)
DEPRECATED RDW RBC AUTO: 15.3 % (ref 12.4–15.4)
EOSINOPHIL # BLD: 0.1 K/UL (ref 0–0.6)
EOSINOPHIL NFR BLD: 1.5 %
GFR SERPLBLD CREATININE-BSD FMLA CKD-EPI: 38 ML/MIN/{1.73_M2}
GLUCOSE BLD-MCNC: 199 MG/DL (ref 70–99)
GLUCOSE BLD-MCNC: 219 MG/DL (ref 70–99)
GLUCOSE BLD-MCNC: 287 MG/DL (ref 70–99)
GLUCOSE BLD-MCNC: 334 MG/DL (ref 70–99)
GLUCOSE BLD-MCNC: 44 MG/DL (ref 70–99)
GLUCOSE BLD-MCNC: 85 MG/DL (ref 70–99)
GLUCOSE SERPL-MCNC: 201 MG/DL (ref 70–99)
HCT VFR BLD AUTO: 33.8 % (ref 36–48)
HGB BLD-MCNC: 10.6 G/DL (ref 12–16)
LYMPHOCYTES # BLD: 1.7 K/UL (ref 1–5.1)
LYMPHOCYTES NFR BLD: 37.1 %
MAGNESIUM SERPL-MCNC: 2.1 MG/DL (ref 1.8–2.4)
MCH RBC QN AUTO: 27.3 PG (ref 26–34)
MCHC RBC AUTO-ENTMCNC: 31.2 G/DL (ref 31–36)
MCV RBC AUTO: 87.4 FL (ref 80–100)
MONOCYTES # BLD: 0.3 K/UL (ref 0–1.3)
MONOCYTES NFR BLD: 5.5 %
NEUTROPHILS # BLD: 2.5 K/UL (ref 1.7–7.7)
NEUTROPHILS NFR BLD: 54.8 %
ORGANISM: ABNORMAL
PERFORMED ON: ABNORMAL
PERFORMED ON: NORMAL
PHOSPHATE SERPL-MCNC: 3.9 MG/DL (ref 2.5–4.9)
PLATELET # BLD AUTO: 152 K/UL (ref 135–450)
PMV BLD AUTO: 10.3 FL (ref 5–10.5)
POTASSIUM SERPL-SCNC: 4.7 MMOL/L (ref 3.5–5.1)
PROT SERPL-MCNC: 7.6 G/DL (ref 6.4–8.2)
RBC # BLD AUTO: 3.86 M/UL (ref 4–5.2)
SODIUM SERPL-SCNC: 136 MMOL/L (ref 136–145)
WBC # BLD AUTO: 4.6 K/UL (ref 4–11)

## 2024-01-20 PROCEDURE — 97530 THERAPEUTIC ACTIVITIES: CPT

## 2024-01-20 PROCEDURE — 2580000003 HC RX 258: Performed by: INTERNAL MEDICINE

## 2024-01-20 PROCEDURE — 83735 ASSAY OF MAGNESIUM: CPT

## 2024-01-20 PROCEDURE — APPSS45 APP SPLIT SHARED TIME 31-45 MINUTES: Performed by: NURSE PRACTITIONER

## 2024-01-20 PROCEDURE — 80048 BASIC METABOLIC PNL TOTAL CA: CPT

## 2024-01-20 PROCEDURE — 6370000000 HC RX 637 (ALT 250 FOR IP): Performed by: INTERNAL MEDICINE

## 2024-01-20 PROCEDURE — 1200000000 HC SEMI PRIVATE

## 2024-01-20 PROCEDURE — 6360000002 HC RX W HCPCS: Performed by: INTERNAL MEDICINE

## 2024-01-20 PROCEDURE — 85025 COMPLETE CBC W/AUTO DIFF WBC: CPT

## 2024-01-20 PROCEDURE — 84100 ASSAY OF PHOSPHORUS: CPT

## 2024-01-20 PROCEDURE — 97535 SELF CARE MNGMENT TRAINING: CPT

## 2024-01-20 PROCEDURE — 36415 COLL VENOUS BLD VENIPUNCTURE: CPT

## 2024-01-20 PROCEDURE — 97162 PT EVAL MOD COMPLEX 30 MIN: CPT

## 2024-01-20 PROCEDURE — 97166 OT EVAL MOD COMPLEX 45 MIN: CPT

## 2024-01-20 PROCEDURE — 99223 1ST HOSP IP/OBS HIGH 75: CPT | Performed by: PSYCHIATRY & NEUROLOGY

## 2024-01-20 PROCEDURE — 99232 SBSQ HOSP IP/OBS MODERATE 35: CPT | Performed by: INTERNAL MEDICINE

## 2024-01-20 PROCEDURE — 80076 HEPATIC FUNCTION PANEL: CPT

## 2024-01-20 PROCEDURE — 76770 US EXAM ABDO BACK WALL COMP: CPT

## 2024-01-20 RX ADMIN — GABAPENTIN 200 MG: 100 CAPSULE ORAL at 20:07

## 2024-01-20 RX ADMIN — INSULIN LISPRO 6 UNITS: 100 INJECTION, SOLUTION INTRAVENOUS; SUBCUTANEOUS at 11:25

## 2024-01-20 RX ADMIN — GABAPENTIN 200 MG: 100 CAPSULE ORAL at 09:00

## 2024-01-20 RX ADMIN — FIDAXOMICIN 200 MG: 200 TABLET, FILM COATED ORAL at 09:07

## 2024-01-20 RX ADMIN — INSULIN LISPRO 2 UNITS: 100 INJECTION, SOLUTION INTRAVENOUS; SUBCUTANEOUS at 17:11

## 2024-01-20 RX ADMIN — INSULIN GLARGINE 15 UNITS: 100 INJECTION, SOLUTION SUBCUTANEOUS at 20:06

## 2024-01-20 RX ADMIN — AMLODIPINE BESYLATE 10 MG: 5 TABLET ORAL at 09:00

## 2024-01-20 RX ADMIN — TRAZODONE HYDROCHLORIDE 200 MG: 50 TABLET ORAL at 20:07

## 2024-01-20 RX ADMIN — GABAPENTIN 200 MG: 100 CAPSULE ORAL at 14:38

## 2024-01-20 RX ADMIN — PALIPERIDONE 9 MG: 3 TABLET, EXTENDED RELEASE ORAL at 09:05

## 2024-01-20 RX ADMIN — HEPARIN SODIUM 5000 UNITS: 5000 INJECTION INTRAVENOUS; SUBCUTANEOUS at 20:07

## 2024-01-20 RX ADMIN — ATORVASTATIN CALCIUM 40 MG: 40 TABLET, FILM COATED ORAL at 20:07

## 2024-01-20 RX ADMIN — OXYCODONE AND ACETAMINOPHEN 1 TABLET: 5; 325 TABLET ORAL at 14:38

## 2024-01-20 RX ADMIN — ASPIRIN 81 MG 81 MG: 81 TABLET ORAL at 09:00

## 2024-01-20 RX ADMIN — PANTOPRAZOLE SODIUM 40 MG: 40 TABLET, DELAYED RELEASE ORAL at 09:00

## 2024-01-20 RX ADMIN — OXYCODONE AND ACETAMINOPHEN 1 TABLET: 5; 325 TABLET ORAL at 02:08

## 2024-01-20 RX ADMIN — FIDAXOMICIN 200 MG: 200 TABLET, FILM COATED ORAL at 20:08

## 2024-01-20 RX ADMIN — SODIUM CHLORIDE, PRESERVATIVE FREE 10 ML: 5 INJECTION INTRAVENOUS at 09:01

## 2024-01-20 RX ADMIN — HEPARIN SODIUM 5000 UNITS: 5000 INJECTION INTRAVENOUS; SUBCUTANEOUS at 09:00

## 2024-01-20 RX ADMIN — CEFTRIAXONE SODIUM 1000 MG: 1 INJECTION, POWDER, FOR SOLUTION INTRAMUSCULAR; INTRAVENOUS at 05:04

## 2024-01-20 RX ADMIN — SODIUM CHLORIDE, PRESERVATIVE FREE 10 ML: 5 INJECTION INTRAVENOUS at 20:11

## 2024-01-20 ASSESSMENT — PAIN DESCRIPTION - DESCRIPTORS: DESCRIPTORS: ACHING

## 2024-01-20 ASSESSMENT — PAIN SCALES - GENERAL
PAINLEVEL_OUTOF10: 6
PAINLEVEL_OUTOF10: 7

## 2024-01-20 ASSESSMENT — PAIN DESCRIPTION - ORIENTATION: ORIENTATION: LOWER

## 2024-01-20 ASSESSMENT — PAIN DESCRIPTION - LOCATION
LOCATION: BACK
LOCATION: BACK

## 2024-01-20 ASSESSMENT — PAIN DESCRIPTION - PAIN TYPE: TYPE: CHRONIC PAIN

## 2024-01-20 NOTE — PLAN OF CARE
Problem: Discharge Planning  Goal: Discharge to home or other facility with appropriate resources  1/20/2024 0441 by Elias Womack RN  Outcome: Progressing     Problem: Pain  Goal: Verbalizes/displays adequate comfort level or baseline comfort level  1/20/2024 0441 by Elias Womack RN  Outcome: Progressing     Problem: Safety - Adult  Goal: Free from fall injury  1/20/2024 0441 by Elias Womack RN  Outcome: Progressing

## 2024-01-21 LAB
ALBUMIN SERPL-MCNC: 2.7 G/DL (ref 3.4–5)
ALP SERPL-CCNC: 383 U/L (ref 40–129)
ALT SERPL-CCNC: 27 U/L (ref 10–40)
AMPHETAMINES SERPL QL SCN: NEGATIVE NG/ML
ANION GAP SERPL CALCULATED.3IONS-SCNC: 8 MMOL/L (ref 3–16)
ANNOTATION COMMENT IMP: NORMAL
AST SERPL-CCNC: 18 U/L (ref 15–37)
BARBITURATES SERPL QL SCN: NEGATIVE NG/ML
BASOPHILS # BLD: 0 K/UL (ref 0–0.2)
BASOPHILS NFR BLD: 0.4 %
BENZODIAZ SERPL QL SCN: NEGATIVE NG/ML
BILIRUB DIRECT SERPL-MCNC: <0.2 MG/DL (ref 0–0.3)
BILIRUB INDIRECT SERPL-MCNC: ABNORMAL MG/DL (ref 0–1)
BILIRUB SERPL-MCNC: <0.2 MG/DL (ref 0–1)
BUN SERPL-MCNC: 20 MG/DL (ref 7–20)
BUPRENORPHINE SERPL-MCNC: NEGATIVE NG/ML
CALCIUM SERPL-MCNC: 8.6 MG/DL (ref 8.3–10.6)
CANNABINOIDS SERPL QL SCN: NEGATIVE NG/ML
CHLORIDE SERPL-SCNC: 106 MMOL/L (ref 99–110)
CO2 SERPL-SCNC: 24 MMOL/L (ref 21–32)
COCAINE SERPL QL SCN: NEGATIVE NG/ML
CREAT SERPL-MCNC: 1.5 MG/DL (ref 0.6–1.2)
DEPRECATED RDW RBC AUTO: 14.8 % (ref 12.4–15.4)
EOSINOPHIL # BLD: 0.1 K/UL (ref 0–0.6)
EOSINOPHIL NFR BLD: 1.4 %
GFR SERPLBLD CREATININE-BSD FMLA CKD-EPI: 38 ML/MIN/{1.73_M2}
GLUCOSE BLD-MCNC: 170 MG/DL (ref 70–99)
GLUCOSE BLD-MCNC: 222 MG/DL (ref 70–99)
GLUCOSE BLD-MCNC: 289 MG/DL (ref 70–99)
GLUCOSE BLD-MCNC: 370 MG/DL (ref 70–99)
GLUCOSE SERPL-MCNC: 268 MG/DL (ref 70–99)
HCT VFR BLD AUTO: 29.4 % (ref 36–48)
HGB BLD-MCNC: 9.2 G/DL (ref 12–16)
LYMPHOCYTES # BLD: 2.1 K/UL (ref 1–5.1)
LYMPHOCYTES NFR BLD: 48.1 %
MAGNESIUM SERPL-MCNC: 2 MG/DL (ref 1.8–2.4)
MCH RBC QN AUTO: 27 PG (ref 26–34)
MCHC RBC AUTO-ENTMCNC: 31.4 G/DL (ref 31–36)
MCV RBC AUTO: 86.1 FL (ref 80–100)
METHADONE SERPL QL SCN: NEGATIVE NG/ML
METHAMPHET SERPL QL: NEGATIVE NG/ML
MONOCYTES # BLD: 0.2 K/UL (ref 0–1.3)
MONOCYTES NFR BLD: 5.2 %
NEUTROPHILS # BLD: 2 K/UL (ref 1.7–7.7)
NEUTROPHILS NFR BLD: 44.9 %
OPIATES SERPL QL SCN: NEGATIVE NG/ML
OXYCODONE SERPL QL: NEGATIVE NG/ML
PCP SERPL QL SCN: NEGATIVE NG/ML
PERFORMED ON: ABNORMAL
PHOSPHATE SERPL-MCNC: 3.9 MG/DL (ref 2.5–4.9)
PLATELET # BLD AUTO: 143 K/UL (ref 135–450)
PMV BLD AUTO: 10.5 FL (ref 5–10.5)
POTASSIUM SERPL-SCNC: 4.4 MMOL/L (ref 3.5–5.1)
PROT SERPL-MCNC: 6.4 G/DL (ref 6.4–8.2)
RBC # BLD AUTO: 3.41 M/UL (ref 4–5.2)
SODIUM SERPL-SCNC: 138 MMOL/L (ref 136–145)
WBC # BLD AUTO: 4.3 K/UL (ref 4–11)

## 2024-01-21 PROCEDURE — 2580000003 HC RX 258: Performed by: INTERNAL MEDICINE

## 2024-01-21 PROCEDURE — 83735 ASSAY OF MAGNESIUM: CPT

## 2024-01-21 PROCEDURE — 6370000000 HC RX 637 (ALT 250 FOR IP): Performed by: INTERNAL MEDICINE

## 2024-01-21 PROCEDURE — 85025 COMPLETE CBC W/AUTO DIFF WBC: CPT

## 2024-01-21 PROCEDURE — 80048 BASIC METABOLIC PNL TOTAL CA: CPT

## 2024-01-21 PROCEDURE — 36415 COLL VENOUS BLD VENIPUNCTURE: CPT

## 2024-01-21 PROCEDURE — 84100 ASSAY OF PHOSPHORUS: CPT

## 2024-01-21 PROCEDURE — 80076 HEPATIC FUNCTION PANEL: CPT

## 2024-01-21 PROCEDURE — 1200000000 HC SEMI PRIVATE

## 2024-01-21 PROCEDURE — 6360000002 HC RX W HCPCS: Performed by: INTERNAL MEDICINE

## 2024-01-21 RX ORDER — INSULIN LISPRO 100 [IU]/ML
4 INJECTION, SOLUTION INTRAVENOUS; SUBCUTANEOUS
Status: DISCONTINUED | OUTPATIENT
Start: 2024-01-21 | End: 2024-01-22

## 2024-01-21 RX ADMIN — INSULIN LISPRO 2 UNITS: 100 INJECTION, SOLUTION INTRAVENOUS; SUBCUTANEOUS at 08:05

## 2024-01-21 RX ADMIN — PANTOPRAZOLE SODIUM 40 MG: 40 TABLET, DELAYED RELEASE ORAL at 08:05

## 2024-01-21 RX ADMIN — INSULIN LISPRO 4 UNITS: 100 INJECTION, SOLUTION INTRAVENOUS; SUBCUTANEOUS at 16:30

## 2024-01-21 RX ADMIN — ASPIRIN 81 MG 81 MG: 81 TABLET ORAL at 08:05

## 2024-01-21 RX ADMIN — TRAZODONE HYDROCHLORIDE 200 MG: 50 TABLET ORAL at 21:21

## 2024-01-21 RX ADMIN — SODIUM CHLORIDE: 9 INJECTION, SOLUTION INTRAVENOUS at 05:41

## 2024-01-21 RX ADMIN — FIDAXOMICIN 200 MG: 200 TABLET, FILM COATED ORAL at 08:06

## 2024-01-21 RX ADMIN — OXYCODONE AND ACETAMINOPHEN 1 TABLET: 5; 325 TABLET ORAL at 12:04

## 2024-01-21 RX ADMIN — SODIUM CHLORIDE, PRESERVATIVE FREE 10 ML: 5 INJECTION INTRAVENOUS at 08:06

## 2024-01-21 RX ADMIN — INSULIN LISPRO 4 UNITS: 100 INJECTION, SOLUTION INTRAVENOUS; SUBCUTANEOUS at 17:04

## 2024-01-21 RX ADMIN — HEPARIN SODIUM 5000 UNITS: 5000 INJECTION INTRAVENOUS; SUBCUTANEOUS at 08:06

## 2024-01-21 RX ADMIN — GABAPENTIN 200 MG: 100 CAPSULE ORAL at 08:05

## 2024-01-21 RX ADMIN — INSULIN GLARGINE 15 UNITS: 100 INJECTION, SOLUTION SUBCUTANEOUS at 21:17

## 2024-01-21 RX ADMIN — ATORVASTATIN CALCIUM 40 MG: 40 TABLET, FILM COATED ORAL at 21:21

## 2024-01-21 RX ADMIN — GABAPENTIN 200 MG: 100 CAPSULE ORAL at 12:53

## 2024-01-21 RX ADMIN — OXYCODONE AND ACETAMINOPHEN 1 TABLET: 5; 325 TABLET ORAL at 23:10

## 2024-01-21 RX ADMIN — CEFTRIAXONE SODIUM 1000 MG: 1 INJECTION, POWDER, FOR SOLUTION INTRAMUSCULAR; INTRAVENOUS at 05:42

## 2024-01-21 RX ADMIN — FIDAXOMICIN 200 MG: 200 TABLET, FILM COATED ORAL at 21:22

## 2024-01-21 RX ADMIN — SODIUM CHLORIDE, PRESERVATIVE FREE 10 ML: 5 INJECTION INTRAVENOUS at 21:19

## 2024-01-21 RX ADMIN — INSULIN LISPRO 4 UNITS: 100 INJECTION, SOLUTION INTRAVENOUS; SUBCUTANEOUS at 21:17

## 2024-01-21 RX ADMIN — HEPARIN SODIUM 5000 UNITS: 5000 INJECTION INTRAVENOUS; SUBCUTANEOUS at 21:18

## 2024-01-21 RX ADMIN — PALIPERIDONE 9 MG: 3 TABLET, EXTENDED RELEASE ORAL at 08:06

## 2024-01-21 RX ADMIN — AMLODIPINE BESYLATE 10 MG: 5 TABLET ORAL at 08:05

## 2024-01-21 RX ADMIN — GABAPENTIN 200 MG: 100 CAPSULE ORAL at 21:21

## 2024-01-21 ASSESSMENT — PAIN - FUNCTIONAL ASSESSMENT: PAIN_FUNCTIONAL_ASSESSMENT: ACTIVITIES ARE NOT PREVENTED

## 2024-01-21 ASSESSMENT — PAIN SCALES - WONG BAKER
WONGBAKER_NUMERICALRESPONSE: 0
WONGBAKER_NUMERICALRESPONSE: 0

## 2024-01-21 ASSESSMENT — PAIN DESCRIPTION - DESCRIPTORS: DESCRIPTORS: ACHING

## 2024-01-21 ASSESSMENT — PAIN SCALES - GENERAL
PAINLEVEL_OUTOF10: 7
PAINLEVEL_OUTOF10: 7

## 2024-01-21 ASSESSMENT — PAIN DESCRIPTION - LOCATION
LOCATION: BACK
LOCATION: BACK

## 2024-01-21 ASSESSMENT — PAIN DESCRIPTION - ORIENTATION: ORIENTATION: LOWER

## 2024-01-21 ASSESSMENT — PAIN DESCRIPTION - PAIN TYPE: TYPE: CHRONIC PAIN

## 2024-01-21 NOTE — PLAN OF CARE
Problem: Discharge Planning  Goal: Discharge to home or other facility with appropriate resources  1/21/2024 1043 by Deyanira Cardoza RN  Outcome: Progressing  Flowsheets (Taken 1/21/2024 0813)  Discharge to home or other facility with appropriate resources: Identify barriers to discharge with patient and caregiver  1/20/2024 2301 by Rosemary Gutierrez RN  Outcome: Progressing  Flowsheets (Taken 1/20/2024 2000)  Discharge to home or other facility with appropriate resources: Identify barriers to discharge with patient and caregiver     Problem: Pain  Goal: Verbalizes/displays adequate comfort level or baseline comfort level  1/21/2024 1043 by Deyanira Cardoza RN  Outcome: Progressing  Flowsheets (Taken 1/21/2024 0803)  Verbalizes/displays adequate comfort level or baseline comfort level: Encourage patient to monitor pain and request assistance  1/20/2024 2301 by Rosemary Gutierrez RN  Outcome: Progressing     Problem: Safety - Adult  Goal: Free from fall injury  1/21/2024 1043 by Deyanira Cardoza RN  Outcome: Progressing  1/20/2024 2301 by Rosemary Gutierrez RN  Outcome: Progressing     Problem: Chronic Conditions and Co-morbidities  Goal: Patient's chronic conditions and co-morbidity symptoms are monitored and maintained or improved  1/21/2024 1043 by Deyanira Cardoza RN  Outcome: Progressing  Flowsheets (Taken 1/21/2024 0813)  Care Plan - Patient's Chronic Conditions and Co-Morbidity Symptoms are Monitored and Maintained or Improved: Monitor and assess patient's chronic conditions and comorbid symptoms for stability, deterioration, or improvement  1/20/2024 2301 by Rosemary Gutierrez RN  Outcome: Progressing  Flowsheets (Taken 1/20/2024 2000)  Care Plan - Patient's Chronic Conditions and Co-Morbidity Symptoms are Monitored and Maintained or Improved: Monitor and assess patient's chronic conditions and comorbid symptoms for stability, deterioration, or improvement     Problem: Nutrition Deficit:  Goal: Optimize nutritional status  1/21/2024 1043 by Nani

## 2024-01-21 NOTE — PLAN OF CARE
Problem: Discharge Planning  Goal: Discharge to home or other facility with appropriate resources  Outcome: Progressing  Flowsheets (Taken 1/20/2024 2000)  Discharge to home or other facility with appropriate resources: Identify barriers to discharge with patient and caregiver     Problem: Pain  Goal: Verbalizes/displays adequate comfort level or baseline comfort level  Outcome: Progressing     Problem: Safety - Adult  Goal: Free from fall injury  Outcome: Progressing     Problem: Chronic Conditions and Co-morbidities  Goal: Patient's chronic conditions and co-morbidity symptoms are monitored and maintained or improved  Outcome: Progressing  Flowsheets (Taken 1/20/2024 2000)  Care Plan - Patient's Chronic Conditions and Co-Morbidity Symptoms are Monitored and Maintained or Improved: Monitor and assess patient's chronic conditions and comorbid symptoms for stability, deterioration, or improvement     Problem: Nutrition Deficit:  Goal: Optimize nutritional status  Outcome: Progressing

## 2024-01-22 ENCOUNTER — APPOINTMENT (OUTPATIENT)
Dept: MRI IMAGING | Age: 64
DRG: 204 | End: 2024-01-22
Payer: MEDICAID

## 2024-01-22 PROBLEM — A41.9 SEPTICEMIA (HCC): Status: RESOLVED | Noted: 2024-01-20 | Resolved: 2024-01-22

## 2024-01-22 LAB
ANION GAP SERPL CALCULATED.3IONS-SCNC: 6 MMOL/L (ref 3–16)
BACTERIA BLD CULT ORG #2: NORMAL
BACTERIA BLD CULT: NORMAL
BUN SERPL-MCNC: 19 MG/DL (ref 7–20)
CALCIUM SERPL-MCNC: 8.4 MG/DL (ref 8.3–10.6)
CHLORIDE SERPL-SCNC: 109 MMOL/L (ref 99–110)
CO2 SERPL-SCNC: 25 MMOL/L (ref 21–32)
CREAT SERPL-MCNC: 1.8 MG/DL (ref 0.6–1.2)
GFR SERPLBLD CREATININE-BSD FMLA CKD-EPI: 31 ML/MIN/{1.73_M2}
GLUCOSE BLD-MCNC: 143 MG/DL (ref 70–99)
GLUCOSE BLD-MCNC: 191 MG/DL (ref 70–99)
GLUCOSE BLD-MCNC: 285 MG/DL (ref 70–99)
GLUCOSE BLD-MCNC: 290 MG/DL (ref 70–99)
GLUCOSE BLD-MCNC: 292 MG/DL (ref 70–99)
GLUCOSE SERPL-MCNC: 262 MG/DL (ref 70–99)
MAGNESIUM SERPL-MCNC: 2.1 MG/DL (ref 1.8–2.4)
PERFORMED ON: ABNORMAL
PHOSPHATE SERPL-MCNC: 4.5 MG/DL (ref 2.5–4.9)
POTASSIUM SERPL-SCNC: 4.7 MMOL/L (ref 3.5–5.1)
SODIUM SERPL-SCNC: 140 MMOL/L (ref 136–145)

## 2024-01-22 PROCEDURE — 97535 SELF CARE MNGMENT TRAINING: CPT

## 2024-01-22 PROCEDURE — 99232 SBSQ HOSP IP/OBS MODERATE 35: CPT | Performed by: INTERNAL MEDICINE

## 2024-01-22 PROCEDURE — 84100 ASSAY OF PHOSPHORUS: CPT

## 2024-01-22 PROCEDURE — APPSS45 APP SPLIT SHARED TIME 31-45 MINUTES: Performed by: NURSE PRACTITIONER

## 2024-01-22 PROCEDURE — 2580000003 HC RX 258: Performed by: INTERNAL MEDICINE

## 2024-01-22 PROCEDURE — 6370000000 HC RX 637 (ALT 250 FOR IP): Performed by: INTERNAL MEDICINE

## 2024-01-22 PROCEDURE — 99233 SBSQ HOSP IP/OBS HIGH 50: CPT | Performed by: PSYCHIATRY & NEUROLOGY

## 2024-01-22 PROCEDURE — 80048 BASIC METABOLIC PNL TOTAL CA: CPT

## 2024-01-22 PROCEDURE — 70551 MRI BRAIN STEM W/O DYE: CPT

## 2024-01-22 PROCEDURE — 6360000002 HC RX W HCPCS: Performed by: INTERNAL MEDICINE

## 2024-01-22 PROCEDURE — 1200000000 HC SEMI PRIVATE

## 2024-01-22 PROCEDURE — 97530 THERAPEUTIC ACTIVITIES: CPT

## 2024-01-22 PROCEDURE — 36415 COLL VENOUS BLD VENIPUNCTURE: CPT

## 2024-01-22 PROCEDURE — 83735 ASSAY OF MAGNESIUM: CPT

## 2024-01-22 RX ORDER — INSULIN LISPRO 100 [IU]/ML
6 INJECTION, SOLUTION INTRAVENOUS; SUBCUTANEOUS
Status: DISCONTINUED | OUTPATIENT
Start: 2024-01-22 | End: 2024-01-24

## 2024-01-22 RX ORDER — INSULIN GLARGINE 100 [IU]/ML
20 INJECTION, SOLUTION SUBCUTANEOUS NIGHTLY
Status: DISCONTINUED | OUTPATIENT
Start: 2024-01-22 | End: 2024-01-25 | Stop reason: HOSPADM

## 2024-01-22 RX ADMIN — PALIPERIDONE 9 MG: 3 TABLET, EXTENDED RELEASE ORAL at 09:18

## 2024-01-22 RX ADMIN — INSULIN LISPRO 6 UNITS: 100 INJECTION, SOLUTION INTRAVENOUS; SUBCUTANEOUS at 17:55

## 2024-01-22 RX ADMIN — INSULIN LISPRO 4 UNITS: 100 INJECTION, SOLUTION INTRAVENOUS; SUBCUTANEOUS at 11:51

## 2024-01-22 RX ADMIN — INSULIN GLARGINE 20 UNITS: 100 INJECTION, SOLUTION SUBCUTANEOUS at 20:48

## 2024-01-22 RX ADMIN — HEPARIN SODIUM 5000 UNITS: 5000 INJECTION INTRAVENOUS; SUBCUTANEOUS at 20:47

## 2024-01-22 RX ADMIN — OXYCODONE AND ACETAMINOPHEN 1 TABLET: 5; 325 TABLET ORAL at 09:19

## 2024-01-22 RX ADMIN — SODIUM CHLORIDE, PRESERVATIVE FREE 10 ML: 5 INJECTION INTRAVENOUS at 09:21

## 2024-01-22 RX ADMIN — SODIUM CHLORIDE, PRESERVATIVE FREE 10 ML: 5 INJECTION INTRAVENOUS at 20:48

## 2024-01-22 RX ADMIN — HEPARIN SODIUM 5000 UNITS: 5000 INJECTION INTRAVENOUS; SUBCUTANEOUS at 09:20

## 2024-01-22 RX ADMIN — GABAPENTIN 200 MG: 100 CAPSULE ORAL at 09:20

## 2024-01-22 RX ADMIN — OXYCODONE AND ACETAMINOPHEN 1 TABLET: 5; 325 TABLET ORAL at 22:19

## 2024-01-22 RX ADMIN — PANTOPRAZOLE SODIUM 40 MG: 40 TABLET, DELAYED RELEASE ORAL at 09:20

## 2024-01-22 RX ADMIN — SODIUM CHLORIDE: 9 INJECTION, SOLUTION INTRAVENOUS at 05:40

## 2024-01-22 RX ADMIN — ATORVASTATIN CALCIUM 40 MG: 40 TABLET, FILM COATED ORAL at 20:47

## 2024-01-22 RX ADMIN — GABAPENTIN 200 MG: 100 CAPSULE ORAL at 20:47

## 2024-01-22 RX ADMIN — INSULIN LISPRO 4 UNITS: 100 INJECTION, SOLUTION INTRAVENOUS; SUBCUTANEOUS at 09:20

## 2024-01-22 RX ADMIN — FIDAXOMICIN 200 MG: 200 TABLET, FILM COATED ORAL at 09:18

## 2024-01-22 RX ADMIN — ASPIRIN 81 MG 81 MG: 81 TABLET ORAL at 09:20

## 2024-01-22 RX ADMIN — INSULIN LISPRO 4 UNITS: 100 INJECTION, SOLUTION INTRAVENOUS; SUBCUTANEOUS at 11:50

## 2024-01-22 RX ADMIN — FIDAXOMICIN 200 MG: 200 TABLET, FILM COATED ORAL at 20:51

## 2024-01-22 RX ADMIN — CEFTRIAXONE SODIUM 1000 MG: 1 INJECTION, POWDER, FOR SOLUTION INTRAMUSCULAR; INTRAVENOUS at 05:41

## 2024-01-22 RX ADMIN — AMLODIPINE BESYLATE 10 MG: 5 TABLET ORAL at 09:19

## 2024-01-22 RX ADMIN — TRAZODONE HYDROCHLORIDE 200 MG: 50 TABLET ORAL at 20:47

## 2024-01-22 RX ADMIN — GABAPENTIN 200 MG: 100 CAPSULE ORAL at 13:41

## 2024-01-22 ASSESSMENT — PAIN DESCRIPTION - DESCRIPTORS
DESCRIPTORS: ACHING;DISCOMFORT
DESCRIPTORS: ACHING

## 2024-01-22 ASSESSMENT — PAIN DESCRIPTION - LOCATION
LOCATION: BACK
LOCATION: BACK

## 2024-01-22 ASSESSMENT — PAIN SCALES - GENERAL
PAINLEVEL_OUTOF10: 7
PAINLEVEL_OUTOF10: 7
PAINLEVEL_OUTOF10: 6

## 2024-01-22 NOTE — CARE COORDINATION
Chart reviewed day 4. Care provided by neuro, ID and IM. Pt is from home alone and has 16 steps to get into her apartment. Pt has recs for SNF and pt is agreeable to a referral to [facility]. Pt states she has been there in the past. Referral was made and awaiting call back. Will continue to follow course for needs. Katharina Garcia RN     [facility] can accept and they will start cert. Katharina Garcia RN

## 2024-01-22 NOTE — PLAN OF CARE
Problem: Discharge Planning  Goal: Discharge to home or other facility with appropriate resources  Outcome: Progressing  Flowsheets (Taken 1/21/2024 2115)  Discharge to home or other facility with appropriate resources: Identify barriers to discharge with patient and caregiver     Problem: Pain  Goal: Verbalizes/displays adequate comfort level or baseline comfort level  Outcome: Progressing  Flowsheets (Taken 1/21/2024 1142 by Deyanira Cardoza RN)  Verbalizes/displays adequate comfort level or baseline comfort level: Encourage patient to monitor pain and request assistance     Problem: Safety - Adult  Goal: Free from fall injury  Outcome: Progressing     Problem: Chronic Conditions and Co-morbidities  Goal: Patient's chronic conditions and co-morbidity symptoms are monitored and maintained or improved  Outcome: Progressing  Flowsheets (Taken 1/21/2024 2115)  Care Plan - Patient's Chronic Conditions and Co-Morbidity Symptoms are Monitored and Maintained or Improved: Monitor and assess patient's chronic conditions and comorbid symptoms for stability, deterioration, or improvement     Problem: Nutrition Deficit:  Goal: Optimize nutritional status  Outcome: Progressing

## 2024-01-23 LAB
ANION GAP SERPL CALCULATED.3IONS-SCNC: 8 MMOL/L (ref 3–16)
BUN SERPL-MCNC: 17 MG/DL (ref 7–20)
CALCIUM SERPL-MCNC: 8.4 MG/DL (ref 8.3–10.6)
CHLORIDE SERPL-SCNC: 109 MMOL/L (ref 99–110)
CO2 SERPL-SCNC: 26 MMOL/L (ref 21–32)
CREAT SERPL-MCNC: 1.7 MG/DL (ref 0.6–1.2)
GFR SERPLBLD CREATININE-BSD FMLA CKD-EPI: 33 ML/MIN/{1.73_M2}
GLUCOSE BLD-MCNC: 112 MG/DL (ref 70–99)
GLUCOSE BLD-MCNC: 235 MG/DL (ref 70–99)
GLUCOSE BLD-MCNC: 375 MG/DL (ref 70–99)
GLUCOSE BLD-MCNC: 40 MG/DL (ref 70–99)
GLUCOSE BLD-MCNC: 65 MG/DL (ref 70–99)
GLUCOSE BLD-MCNC: 83 MG/DL (ref 70–99)
GLUCOSE SERPL-MCNC: 145 MG/DL (ref 70–99)
MAGNESIUM SERPL-MCNC: 2.1 MG/DL (ref 1.8–2.4)
PERFORMED ON: ABNORMAL
PERFORMED ON: NORMAL
PHOSPHATE SERPL-MCNC: 4 MG/DL (ref 2.5–4.9)
POTASSIUM SERPL-SCNC: 4.4 MMOL/L (ref 3.5–5.1)
SODIUM SERPL-SCNC: 143 MMOL/L (ref 136–145)

## 2024-01-23 PROCEDURE — 83735 ASSAY OF MAGNESIUM: CPT

## 2024-01-23 PROCEDURE — 6370000000 HC RX 637 (ALT 250 FOR IP): Performed by: INTERNAL MEDICINE

## 2024-01-23 PROCEDURE — 6360000002 HC RX W HCPCS: Performed by: INTERNAL MEDICINE

## 2024-01-23 PROCEDURE — 97530 THERAPEUTIC ACTIVITIES: CPT

## 2024-01-23 PROCEDURE — 97535 SELF CARE MNGMENT TRAINING: CPT

## 2024-01-23 PROCEDURE — 36415 COLL VENOUS BLD VENIPUNCTURE: CPT

## 2024-01-23 PROCEDURE — 2580000003 HC RX 258: Performed by: INTERNAL MEDICINE

## 2024-01-23 PROCEDURE — 84100 ASSAY OF PHOSPHORUS: CPT

## 2024-01-23 PROCEDURE — 1200000000 HC SEMI PRIVATE

## 2024-01-23 PROCEDURE — 80048 BASIC METABOLIC PNL TOTAL CA: CPT

## 2024-01-23 RX ADMIN — HEPARIN SODIUM 5000 UNITS: 5000 INJECTION INTRAVENOUS; SUBCUTANEOUS at 21:08

## 2024-01-23 RX ADMIN — GABAPENTIN 200 MG: 100 CAPSULE ORAL at 09:12

## 2024-01-23 RX ADMIN — ASPIRIN 81 MG 81 MG: 81 TABLET ORAL at 09:12

## 2024-01-23 RX ADMIN — SODIUM CHLORIDE, PRESERVATIVE FREE 10 ML: 5 INJECTION INTRAVENOUS at 21:07

## 2024-01-23 RX ADMIN — GABAPENTIN 200 MG: 100 CAPSULE ORAL at 21:07

## 2024-01-23 RX ADMIN — TRAZODONE HYDROCHLORIDE 200 MG: 50 TABLET ORAL at 21:07

## 2024-01-23 RX ADMIN — HEPARIN SODIUM 5000 UNITS: 5000 INJECTION INTRAVENOUS; SUBCUTANEOUS at 09:12

## 2024-01-23 RX ADMIN — FIDAXOMICIN 200 MG: 200 TABLET, FILM COATED ORAL at 21:07

## 2024-01-23 RX ADMIN — FIDAXOMICIN 200 MG: 200 TABLET, FILM COATED ORAL at 09:12

## 2024-01-23 RX ADMIN — PALIPERIDONE 9 MG: 3 TABLET, EXTENDED RELEASE ORAL at 09:12

## 2024-01-23 RX ADMIN — OXYCODONE AND ACETAMINOPHEN 1 TABLET: 5; 325 TABLET ORAL at 15:52

## 2024-01-23 RX ADMIN — PANTOPRAZOLE SODIUM 40 MG: 40 TABLET, DELAYED RELEASE ORAL at 09:12

## 2024-01-23 RX ADMIN — ATORVASTATIN CALCIUM 40 MG: 40 TABLET, FILM COATED ORAL at 21:07

## 2024-01-23 RX ADMIN — INSULIN LISPRO 8 UNITS: 100 INJECTION, SOLUTION INTRAVENOUS; SUBCUTANEOUS at 17:17

## 2024-01-23 RX ADMIN — OXYCODONE AND ACETAMINOPHEN 1 TABLET: 5; 325 TABLET ORAL at 21:07

## 2024-01-23 RX ADMIN — INSULIN LISPRO 6 UNITS: 100 INJECTION, SOLUTION INTRAVENOUS; SUBCUTANEOUS at 09:13

## 2024-01-23 RX ADMIN — GABAPENTIN 200 MG: 100 CAPSULE ORAL at 15:52

## 2024-01-23 RX ADMIN — AMLODIPINE BESYLATE 10 MG: 5 TABLET ORAL at 09:12

## 2024-01-23 RX ADMIN — INSULIN LISPRO 6 UNITS: 100 INJECTION, SOLUTION INTRAVENOUS; SUBCUTANEOUS at 17:16

## 2024-01-23 RX ADMIN — INSULIN GLARGINE 20 UNITS: 100 INJECTION, SOLUTION SUBCUTANEOUS at 21:07

## 2024-01-23 ASSESSMENT — PAIN SCALES - GENERAL
PAINLEVEL_OUTOF10: 7
PAINLEVEL_OUTOF10: 8
PAINLEVEL_OUTOF10: 0

## 2024-01-23 ASSESSMENT — PAIN DESCRIPTION - DESCRIPTORS: DESCRIPTORS: ACHING

## 2024-01-23 ASSESSMENT — PAIN DESCRIPTION - LOCATION: LOCATION: BACK

## 2024-01-23 ASSESSMENT — PAIN DESCRIPTION - ORIENTATION: ORIENTATION: LOWER

## 2024-01-23 NOTE — CARE COORDINATION
Chart reviewed day 5. Care provided by IM. Pt is from home alone. She has been accepted into [facility] and cert is pending. JAYLA completed [id number]. Will continue to follow course for needs. Katharina Garcia RN

## 2024-01-23 NOTE — PLAN OF CARE
Problem: Discharge Planning  Goal: Discharge to home or other facility with appropriate resources  Outcome: Progressing     Problem: Pain  Goal: Verbalizes/displays adequate comfort level or baseline comfort level  Outcome: Progressing     Problem: Safety - Adult  Goal: Free from fall injury  Outcome: Progressing     Problem: Chronic Conditions and Co-morbidities  Goal: Patient's chronic conditions and co-morbidity symptoms are monitored and maintained or improved  Outcome: Progressing     Problem: Nutrition Deficit:  Goal: Optimize nutritional status  Outcome: Progressing

## 2024-01-23 NOTE — DISCHARGE INSTR - COC
M41.20    SOBOE (shortness of breath on exertion) R06.02    Chronic pain disorder G89.4    DDD (degenerative disc disease), lumbar M51.36    Diabetic polyneuropathy associated with type 2 diabetes mellitus ([facility]) E11.42    Other secondary scoliosis, lumbosacral region M41.57    Thoracic spondylosis without myelopathy M47.814    Morbid obesity due to excess calories ([facility]) E66.01    Age-related nuclear cataract of both eyes H25.13    Hypermetropia, bilateral H52.03    Hypertensive retinopathy, bilateral H35.033    Vitreous degeneration, bilateral H43.813    Acute bilateral low back pain with left-sided sciatica M54.42    History of CVA (cerebrovascular accident) without residual deficits Z86.73    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 stage 3 chronic kidney disease (HCC) I13.0, I50.22, N18.30    S/P mastectomy, right Z90.11    Acute cystitis without hematuria N30.00    Hypomagnesemia E83.42    Chest pain R07.9    CAD S/P percutaneous coronary angioplasty I25.10, Z98.61    Hyperglycemia due to type 2 diabetes mellitus ([facility]) E11.65    Hx of heart artery stent Z95.5    Nuclear senile cataract H25.10    Unintentional weight loss R63.4    Chronic anemia D64.9    Facial abscess L02.01    Asymptomatic bacteriuria R82.71    Acute encephalopathy G93.40    Tobacco use disorder F17.200    Severe malnutrition ([facility]) E43    Acute right eye pain H57.11    Suspected condition R69    Bipolar affective disorder, currently depressed, moderate ([facility]) F31.32    Seasonal allergies J30.2    Symptomatic bradycardia R00.1    Dyspnea R06.00    Diffuse pain R52    Hypoglycemia E16.2    Acquired absence of other specified parts of digestive tract Z90.49    Atherosclerotic heart disease of native coronary artery without angina pectoris I25.10    Cognitive communication deficit R41.841    Hypertensive heart and chronic kidney disease with heart failure and stage 1 through stage 4 chronic kidney

## 2024-01-23 NOTE — DISCHARGE INSTR - DIET

## 2024-01-24 LAB
GLUCOSE BLD-MCNC: 117 MG/DL (ref 70–99)
GLUCOSE BLD-MCNC: 212 MG/DL (ref 70–99)
GLUCOSE BLD-MCNC: 285 MG/DL (ref 70–99)
GLUCOSE BLD-MCNC: 427 MG/DL (ref 70–99)
PERFORMED ON: ABNORMAL

## 2024-01-24 PROCEDURE — 6370000000 HC RX 637 (ALT 250 FOR IP): Performed by: STUDENT IN AN ORGANIZED HEALTH CARE EDUCATION/TRAINING PROGRAM

## 2024-01-24 PROCEDURE — 97530 THERAPEUTIC ACTIVITIES: CPT

## 2024-01-24 PROCEDURE — 97535 SELF CARE MNGMENT TRAINING: CPT

## 2024-01-24 PROCEDURE — 1200000000 HC SEMI PRIVATE

## 2024-01-24 PROCEDURE — 97116 GAIT TRAINING THERAPY: CPT

## 2024-01-24 PROCEDURE — 6370000000 HC RX 637 (ALT 250 FOR IP): Performed by: INTERNAL MEDICINE

## 2024-01-24 PROCEDURE — 2580000003 HC RX 258: Performed by: INTERNAL MEDICINE

## 2024-01-24 PROCEDURE — 6360000002 HC RX W HCPCS: Performed by: INTERNAL MEDICINE

## 2024-01-24 RX ORDER — INSULIN LISPRO 100 [IU]/ML
2 INJECTION, SOLUTION INTRAVENOUS; SUBCUTANEOUS
Status: DISCONTINUED | OUTPATIENT
Start: 2024-01-24 | End: 2024-01-25

## 2024-01-24 RX ADMIN — FIDAXOMICIN 200 MG: 200 TABLET, FILM COATED ORAL at 09:19

## 2024-01-24 RX ADMIN — INSULIN LISPRO 2 UNITS: 100 INJECTION, SOLUTION INTRAVENOUS; SUBCUTANEOUS at 09:18

## 2024-01-24 RX ADMIN — INSULIN GLARGINE 20 UNITS: 100 INJECTION, SOLUTION SUBCUTANEOUS at 20:25

## 2024-01-24 RX ADMIN — GABAPENTIN 200 MG: 100 CAPSULE ORAL at 20:25

## 2024-01-24 RX ADMIN — HEPARIN SODIUM 5000 UNITS: 5000 INJECTION INTRAVENOUS; SUBCUTANEOUS at 09:19

## 2024-01-24 RX ADMIN — INSULIN LISPRO 2 UNITS: 100 INJECTION, SOLUTION INTRAVENOUS; SUBCUTANEOUS at 13:10

## 2024-01-24 RX ADMIN — FIDAXOMICIN 200 MG: 200 TABLET, FILM COATED ORAL at 20:27

## 2024-01-24 RX ADMIN — TRAZODONE HYDROCHLORIDE 200 MG: 50 TABLET ORAL at 20:25

## 2024-01-24 RX ADMIN — INSULIN LISPRO 4 UNITS: 100 INJECTION, SOLUTION INTRAVENOUS; SUBCUTANEOUS at 20:25

## 2024-01-24 RX ADMIN — GABAPENTIN 200 MG: 100 CAPSULE ORAL at 15:51

## 2024-01-24 RX ADMIN — OXYCODONE AND ACETAMINOPHEN 1 TABLET: 5; 325 TABLET ORAL at 20:25

## 2024-01-24 RX ADMIN — SODIUM CHLORIDE, PRESERVATIVE FREE 10 ML: 5 INJECTION INTRAVENOUS at 20:25

## 2024-01-24 RX ADMIN — INSULIN LISPRO 2 UNITS: 100 INJECTION, SOLUTION INTRAVENOUS; SUBCUTANEOUS at 17:36

## 2024-01-24 RX ADMIN — PALIPERIDONE 9 MG: 3 TABLET, EXTENDED RELEASE ORAL at 09:19

## 2024-01-24 RX ADMIN — GABAPENTIN 200 MG: 100 CAPSULE ORAL at 09:19

## 2024-01-24 RX ADMIN — INSULIN LISPRO 4 UNITS: 100 INJECTION, SOLUTION INTRAVENOUS; SUBCUTANEOUS at 17:36

## 2024-01-24 RX ADMIN — ATORVASTATIN CALCIUM 40 MG: 40 TABLET, FILM COATED ORAL at 20:25

## 2024-01-24 RX ADMIN — ASPIRIN 81 MG 81 MG: 81 TABLET ORAL at 09:19

## 2024-01-24 RX ADMIN — AMLODIPINE BESYLATE 10 MG: 5 TABLET ORAL at 09:19

## 2024-01-24 RX ADMIN — PANTOPRAZOLE SODIUM 40 MG: 40 TABLET, DELAYED RELEASE ORAL at 09:19

## 2024-01-24 RX ADMIN — HEPARIN SODIUM 5000 UNITS: 5000 INJECTION INTRAVENOUS; SUBCUTANEOUS at 20:26

## 2024-01-24 ASSESSMENT — PAIN SCALES - GENERAL: PAINLEVEL_OUTOF10: 7

## 2024-01-24 NOTE — CARE COORDINATION
Chart reviewed day 6. Care provided by IM. Pt is from home alone. She has recs for SNF and has been accepted into [facility]. Her cert has been approved. Her glucose has been low today at 40. MD is making adjustments to her insulin. Pt may be ready to go tomorrow. Will continue to follow course for needs. Katharina Garcia RN

## 2024-01-25 VITALS
SYSTOLIC BLOOD PRESSURE: 125 MMHG | HEIGHT: 63 IN | OXYGEN SATURATION: 98 % | RESPIRATION RATE: 17 BRPM | HEART RATE: 57 BPM | TEMPERATURE: 98.3 F | WEIGHT: 125.5 LBS | BODY MASS INDEX: 22.24 KG/M2 | DIASTOLIC BLOOD PRESSURE: 68 MMHG

## 2024-01-25 LAB
GLUCOSE BLD-MCNC: 217 MG/DL (ref 70–99)
GLUCOSE BLD-MCNC: 227 MG/DL (ref 70–99)
PERFORMED ON: ABNORMAL
PERFORMED ON: ABNORMAL

## 2024-01-25 PROCEDURE — 6370000000 HC RX 637 (ALT 250 FOR IP): Performed by: INTERNAL MEDICINE

## 2024-01-25 PROCEDURE — 2580000003 HC RX 258: Performed by: INTERNAL MEDICINE

## 2024-01-25 PROCEDURE — 97530 THERAPEUTIC ACTIVITIES: CPT

## 2024-01-25 PROCEDURE — 97535 SELF CARE MNGMENT TRAINING: CPT

## 2024-01-25 PROCEDURE — 6360000002 HC RX W HCPCS: Performed by: INTERNAL MEDICINE

## 2024-01-25 RX ORDER — VANCOMYCIN HYDROCHLORIDE 125 MG/1
125 CAPSULE ORAL 4 TIMES DAILY
Qty: 40 CAPSULE | Refills: 0 | Status: SHIPPED | OUTPATIENT
Start: 2024-01-25 | End: 2024-02-04

## 2024-01-25 RX ORDER — INSULIN LISPRO 100 [IU]/ML
5 INJECTION, SOLUTION INTRAVENOUS; SUBCUTANEOUS
Status: DISCONTINUED | OUTPATIENT
Start: 2024-01-25 | End: 2024-01-25 | Stop reason: HOSPADM

## 2024-01-25 RX ORDER — VANCOMYCIN HYDROCHLORIDE 50 MG/ML
125 KIT ORAL EVERY 8 HOURS SCHEDULED
Qty: 75 ML | Refills: 0 | Status: SHIPPED | OUTPATIENT
Start: 2024-01-25 | End: 2024-01-25 | Stop reason: HOSPADM

## 2024-01-25 RX ORDER — CEFDINIR 300 MG/1
300 CAPSULE ORAL DAILY
Qty: 5 CAPSULE | Refills: 0 | Status: SHIPPED | OUTPATIENT
Start: 2024-01-25 | End: 2024-01-30

## 2024-01-25 RX ADMIN — SODIUM CHLORIDE, PRESERVATIVE FREE 10 ML: 5 INJECTION INTRAVENOUS at 08:43

## 2024-01-25 RX ADMIN — OXYCODONE AND ACETAMINOPHEN 1 TABLET: 5; 325 TABLET ORAL at 10:59

## 2024-01-25 RX ADMIN — HEPARIN SODIUM 5000 UNITS: 5000 INJECTION INTRAVENOUS; SUBCUTANEOUS at 08:43

## 2024-01-25 RX ADMIN — FIDAXOMICIN 200 MG: 200 TABLET, FILM COATED ORAL at 08:42

## 2024-01-25 RX ADMIN — GABAPENTIN 200 MG: 100 CAPSULE ORAL at 08:43

## 2024-01-25 RX ADMIN — ASPIRIN 81 MG 81 MG: 81 TABLET ORAL at 08:42

## 2024-01-25 RX ADMIN — INSULIN LISPRO 2 UNITS: 100 INJECTION, SOLUTION INTRAVENOUS; SUBCUTANEOUS at 08:42

## 2024-01-25 RX ADMIN — AMLODIPINE BESYLATE 10 MG: 5 TABLET ORAL at 08:43

## 2024-01-25 RX ADMIN — PANTOPRAZOLE SODIUM 40 MG: 40 TABLET, DELAYED RELEASE ORAL at 08:43

## 2024-01-25 RX ADMIN — PALIPERIDONE 9 MG: 3 TABLET, EXTENDED RELEASE ORAL at 08:42

## 2024-01-25 ASSESSMENT — PAIN SCALES - GENERAL: PAINLEVEL_OUTOF10: 7

## 2024-01-25 NOTE — PROGRESS NOTES
Agustina Fried  Neurology Follow-up  [facility] Neurology    Date of Service: 1/20/2024    Subjective:   CC: Follow up today regarding: Syncope and collapse    Events noted. Chart and lab reviewed. Feels tired today but no new symptoms. MRI pending.        ROS : A 10-12 system review obtained and updated today and is unremarkable except as mentioned  in my interval history.     Past medical history, social history, medication and family history reviewed.       Objective:  Exam:   Constitutional:   Vitals:    01/19/24 1640 01/19/24 2044 01/20/24 0045 01/20/24 0500   BP: (!) 179/72 (!) 111/58 (!) 116/56 (!) 155/71   Pulse: 62 83 60 57   Resp: 15 17 17 18   Temp: 98.1 °F (36.7 °C) 98.1 °F (36.7 °C) 97.5 °F (36.4 °C) 98 °F (36.7 °C)   TempSrc: Axillary Oral Axillary Axillary   SpO2: 99% 96% 100% 99%   Weight:       Height:         General appearance:  Normal development and appear in no acute distress.   Mental Status:   Oriented to person, place, problem, and time.    Memory: Good immediate recall.  Intact remote memory  Normal attention span and concentration.  Language: intact naming, repeating and fluency   Good fund of Knowledge.   Cranial Nerves:   II:   Pupils: equal, round, reactive to light  III,IV,VI: Extra Ocular Movements are intact. No nystagmus  V: Facial sensation is intact  VII: Facial strength and movements: intact and symmetric  XII: Tongue movements are normal  Musculoskeletal: 5/5 in all 4 extremities. Generalized weakness  Tone: Normal tone.   Reflexes: Symmetric 2+ in both arms and legs.  Coordination: no pronator drift, no dysmetria with FNF  Sensation: normal.  Gait/Posture: steady gait    MDM:      A. Problems (any 1)    High:    [x] Acute/Chronic Illness/injury posing threat to life or bodily function:    [] Severe exacerbation of chronic illness:      Moderate:    []     1 or more chronic illness with exacerbation, progression or side effect of treatment or  []     2 or more stable chronic 
    Agustina Fried  Neurology Follow-up  [facility] Neurology    Date of Service: 1/22/2024    Subjective:   CC: Follow up today regarding: Syncope and collapse    Events noted. Chart and lab reviewed. Feeling better today.  Hoping for MRI today.      ROS : A 10-12 system review obtained and updated today and is unremarkable except as mentioned  in my interval history.     Past medical history, social history, medication and family history reviewed.       Objective:  Exam:   Constitutional:   Vitals:    01/21/24 2340 01/22/24 0500 01/22/24 0919 01/22/24 1043   BP:  (!) 136/58 (!) 152/76 (!) 149/67   Pulse:  56 77 58   Resp: 16 17 18    Temp:  97.7 °F (36.5 °C) 97.2 °F (36.2 °C)    TempSrc:  Oral Oral    SpO2:  99% 99% 100%   Weight:       Height:         General appearance:  Normal development and appear in no acute distress.   Mental Status:   Oriented to person, place, problem, and time.    Memory: Good immediate recall.  Intact remote memory  Normal attention span and concentration.  Language: intact naming, repeating and fluency   Good fund of Knowledge.   Cranial Nerves:   II:   Pupils: equal, round, reactive to light  III,IV,VI: Extra Ocular Movements are intact. No nystagmus  V: Facial sensation is intact  VII: Facial strength and movements: intact and symmetric  XII: Tongue movements are normal  Musculoskeletal: 5/5 in all 4 extremities. Generalized weakness  Tone: Normal tone.   Reflexes: Symmetric 2+ in both arms and legs.  Coordination: no pronator drift, no dysmetria with FNF  Sensation: normal.  Gait/Posture: steady gait    MDM:      A. Problems (any 1)    High:    [x] Acute/Chronic Illness/injury posing threat to life or bodily function:    [] Severe exacerbation of chronic illness:      Moderate:    []     1 or more chronic illness with exacerbation, progression or side effect of treatment or  []     2 or more stable chronic illnesses or  []     1 acute illness with systemic symptoms   
    In-Patient Progress Note    Patient:  Agustina Fried 63 y.o. female MRN: [id number]     Date of Service: 1/22/2024    Hospital Day: 5      Chief complaint: had concerns including Altered Mental Status (Patient found face down by family member, attempted to move her and she lost control of her bowels and vomited, was minimally responsive but able to communicate with EMS, hx of diabetes, no hx of seizures).      Assessment and Plan   Agustina Fried, a 63 y.o. female, with a history of  hypertension, hyperlipidemia, insulin-dependent diabetes mellitus, CAD, CHF, CKD stage III, CVA, anxiety/depression, solitary kidney, colonic adenocarcinoma s/p right hemicolectomy, was admitted on 1/18/2024 with complaints of had concerns including Altered Mental Status (Patient found face down by family member, attempted to move her and she lost control of her bowels and vomited, was minimally responsive but able to communicate with EMS, hx of diabetes, no hx of seizures).    Assessment and plan    Syncope and collapse, likely 2/2 orthostatic hypotension  -Bilateral carotid artery US done - <50% stenosis  -Chronically elevated troponin stable  -Consulted cardiology - echo w/o significant abnormality - no further workup planned  -Patient was significantly drowsy raising concern for seizure as well; neurology consulted - EEG negative for any epileptogenic focus.  -orthostatic hypotension has resolved with fluids  -Continuous telemetry monitoring continued  -Fall precautions continued  -PT/OT team on board-recommending subacute/skilled nursing facility - patient agreeable to placement. Patient wishes to eventual transition to assisted living     Orthostatic hypotension, resolved  -Orthostatic blood pressure was positive-started IV fluid - rechecked - negative     UTI, symptomatic; proteus mirabilis  -Continue on ceftriaxone-can be discharged on oral cefdinir to complete a total of 5-day course     Chronic Cdiff  -continue on 
4 Eyes Skin Assessment     The patient is being assess for   Admission    I agree that 2 RN's have performed a thorough Head to Toe Skin Assessment on the patient. ALL assessment sites listed below have been assessed.      Areas assessed by both nurses:   [x]   Head, Face, and Ears   [x]   Shoulders, Back, and Chest, Abdomen  [x]   Arms, Elbows, and Hands   [x]   Coccyx, Sacrum, and Ischium  [x]   Legs, Feet, and Heels            **SHARE this note so that the co-signing nurse is able to place an eSignature**    Co-signer eSignature: Electronically signed by David Strauss RN on 1/18/24 at 10:04 PM EST    Does the Patient have Skin Breakdown?  Yes LDA WOUND CARE was Initiated documentation include the Adrienne-wound, Wound Assessment, Measurements, Dressing Treatment, Drainage, and Color\",          Maco Prevention initiated:  Yes   Wound Care Orders initiated:  Yes      WOC nurse consulted for Pressure Injury (Stage 3,4, Unstageable, DTI, NWPT, Complex wounds)and New or Established Ostomies:  Yes , unstageable    Primary Nurse eSignature: Electronically signed by Rosemary Gutierrez RN on 1/18/24 at 10:01 PM EST       Coccyx     Left foot  
Attempted to call report to number given by , left a message with a call back number on a secure line. Transport arrived to take pt to [facility], IV removed, event monitor initiated, instructions reviewed with pt, pt left with belongings with [facility]  
Comprehensive Nutrition Assessment    Type and Reason for Visit:  Initial, Wound    Nutrition Recommendations/Plan:   Modify diet to carb control   Add Glucerna BID   Encourage PO and protein intakes for wound healing   Obtain updated weight   Monitor nutrition adequacy, pertinent labs, bowel habits, wt changes, and clinical progress     Malnutrition Assessment:  Malnutrition Status:  At risk for malnutrition (Comment) (01/19/24 6486)      Nutrition Assessment:    Pt admitted with syncope and collapse. Hx of colon cancer s/p hemicolectomy, CAD, CHF, and DM. Positive nutrition screen for wounds, wound care consulted. Currently ordered general diet, recommend carb controlled diet. PO intakes of % since admission. Pt drinking ONS on previous admission, RD to add for wound healing. No weight obtained this admission, will order. Will continue to monitor further nutritional needs.    Nutrition Related Findings:    Active BS. BM on 1/18. Labs reviewed. Wound Type: Pressure Injury, Unstageable, Deep Tissue Injury       Current Nutrition Intake & Therapies:    Average Meal Intake: %  Average Supplements Intake: None Ordered  ADULT DIET; Regular    Anthropometric Measures:  Height: 160 cm (5' 3\")  Ideal Body Weight (IBW): 115 lbs (52 kg)       Current Body Weight: 50.8 kg (112 lb), 97.4 % IBW. Weight Source: Not Specified  Current BMI (kg/m2): 19.8  Usual Body Weight:  (120-140 over past year)                       BMI Categories: Normal Weight (BMI 18.5-24.9)    Estimated Daily Nutrient Needs:  Energy Requirements Based On: Kcal/kg (30-35)  Weight Used for Energy Requirements: Ideal  Energy (kcal/day): 8912-8975 kcal  Weight Used for Protein Requirements: Ideal (1.2-1.5 g/kg)  Protein (g/day): 62-78 g     Fluid (ml/day): Less than 2000 mL per CHF recommendations    Nutrition Diagnosis:   Increased nutrient needs related to increase demand for energy/nutrients as evidenced by wounds    Nutrition Interventions: 
ID Follow-up NOTE    CC:   Hx c dif, UTI   Antibiotics: Ceftriaxone, fidaxomicin    Admit Date: 1/18/2024  Hospital Day: 3    Subjective:     Patient states dyuria improved, no BM since admission so diarrhea improved. No fevers or chills.       Objective:     Patient Vitals for the past 8 hrs:   BP Temp Temp src Pulse Resp SpO2   01/20/24 0850 (!) 156/66 98 °F (36.7 °C) Oral 57 16 98 %   01/20/24 0500 (!) 155/71 98 °F (36.7 °C) Axillary 57 18 99 %     I/O last 3 completed shifts:  In: 1262 [P.O.:1262]  Out: 2200 [Urine:2200]  No intake/output data recorded.    EXAM:  GENERAL: No apparent distress.    HEENT: Membranes moist, no oral lesion  NECK:  Supple, no lymphadenopathy  LUNGS: Clear b/l, no rales, no dullness  CARDIAC: RRR, no murmur appreciated  ABD:  + BS, soft / NT, no CVA or SP tenderness   EXT:  No rash, no edema, no lesions  NEURO: No focal neurologic findings  PSYCH: Orientation, sensorium, mood normal  LINES:  Peripheral iv       Data Review:  Lab Results   Component Value Date    WBC 7.0 01/19/2024    HGB 10.9 (L) 01/19/2024    HCT 35.4 (L) 01/19/2024    MCV 86.6 01/19/2024     01/19/2024     Lab Results   Component Value Date    CREATININE 1.7 (H) 01/19/2024    BUN 29 (H) 01/19/2024     01/19/2024    K 4.7 01/19/2024     01/19/2024    CO2 24 01/19/2024       Hepatic Function Panel:   Lab Results   Component Value Date/Time    ALKPHOS 510 01/19/2024 07:38 AM    ALT 39 01/19/2024 07:38 AM    AST 22 01/19/2024 07:38 AM    PROT 7.8 01/19/2024 07:38 AM    PROT 8.1 01/05/2013 10:34 PM    BILITOT <0.2 01/19/2024 07:38 AM    BILIDIR <0.2 01/19/2024 07:38 AM    IBILI see below 01/19/2024 07:38 AM    LABALBU 3.2 01/19/2024 07:38 AM       MICRO:  Ucx 1/18: proteus mirabilis  Susceptibility    Proteus mirabilis (1)    Antibiotic Interpretation Microscan  Method Status    ampicillin Sensitive <=8 mcg/mL BACTERIAL SUSCEPTIBILITY PANEL BY ALFREDO     ampicillin-sulbactam Sensitive <=4/2 mcg/mL 
Occupational / Physical Therapy    OT/PT orders received and pt chart reviewed. RN reported pt to prepping for transport off floor shortly for doppler of carotid. Will hold pt this date and reattempt pt tomorrow if medically appropriate. Thank you.    Abdirashid Golden, OTR/REG Quiñones, PT, DPT   
Occupational Therapy  Facility/Department: [facility] B3 - MED SURG  Daily Treatment Note  NAME: Agustina Fried  : 1960  MRN: [id number]  Date of Service: 2024    Discharge Recommendations:  Subacute/Skilled Nursing Facility  OT Equipment Recommendations  Equipment Needed: No    AM-PAC score  AM-PAC Inpatient Daily Activity Raw Score: 18 (24 1204)  AM-PAC Inpatient ADL T-Scale Score : 38.66 (24 1204)  ADL Inpatient CMS 0-100% Score: 46.65 (24 1204)  ADL Inpatient CMS G-Code Modifier : CK (24)    Therapy discharge recommendations take into account each patient's current medical complexities and are subject to input/oversight from the patient's healthcare team.   Barriers to Home Discharge:   [] Steps to access home entry or bed/bath:   [] Unable to transfer, ambulate, or propel wheelchair household distances without assist   [x] Limited available assist at home upon discharge    [x] Patient or family requests d/c to post-acute facility    [x] Poor cognition/safety awareness for d/c to home alone    []Unable to maintain ordered weight bearing status    [] Patient with salient signs of long-standing immobility   [x] Patient is at risk for falls due to:   [x] Other: Decreased safety and independence w/ ADLs.     If pt is unable to be seen after this session, please let this note serve as discharge summary.  Please see case management note for discharge disposition.  Thank you.    Patient Diagnosis(es): The primary encounter diagnosis was Syncope and collapse. Diagnoses of Chronic kidney disease, unspecified CKD stage and Acute cystitis without hematuria were also pertinent to this visit.     Assessment    Assessment: Pt received for OT session resting in bed to address current functional deficits that inhibit independence and safety with ADLs and functional mobility. Pt agreeable to session, reporting no pain. During session, pt completed bed mobility w/ increased time and SBA, 
Occupational Therapy  Facility/Department: [facility] B3 - MED SURG  Daily Treatment Note  NAME: Agustina Fried  : 1960  MRN: [id number]    Date of Service: 2024    Discharge Recommendations:  Subacute/Skilled Nursing Facility  OT Equipment Recommendations  Equipment Needed: No (defer)    Therapy discharge recommendations take into account each patient's current medical complexities and are subject to input/oversight from the patient's healthcare team.   Barriers to Home Discharge:   [] Steps to access home entry or bed/bath:   [] Unable to transfer, ambulate, or propel wheelchair household distances without assist   [x] Limited available assist at home upon discharge    [x] Patient or family requests d/c to post-acute facility    [x] Poor cognition/safety awareness for d/c to home alone    []Unable to maintain ordered weight bearing status    [] Patient with salient signs of long-standing immobility   [x] Patient is at risk for falls due to:   [x] Other: Decreased safety and independence w/ ADLs.      If pt is unable to be seen after this session, please let this note serve as discharge summary.  Please see case management note for discharge disposition.  Thank you.    Patient Diagnosis(es): The primary encounter diagnosis was Syncope and collapse. Diagnoses of Chronic kidney disease, unspecified CKD stage and Acute cystitis without hematuria were also pertinent to this visit.     Assessment    Assessment: Pt supine in bed upon arrival, reported fatigue but agreeable to participate in therapy. Pt tolerated session fair this date still remaining limited by fatigue, weakness, and decreased activity tolerance and endurance. Pt required Min A for bed mobility, CG-Min A for seated and standing balance, and intermittent min A for standing ADLs d/t unsteadiness noted w/ stance. Pt also required mod vc's for safety awareness and to maintain / correct balance w/ participation in functional tasks. Pt still functioning 
Occupational Therapy  Facility/Department: [facility] B3 - MED SURG  Daily Treatment Note  NAME: Agustina Fried  : 1960  MRN: [id number]    Date of Service: 2024    Discharge Recommendations:  Subacute/Skilled Nursing Facility  OT Equipment Recommendations  Equipment Needed: No (defer)    Therapy discharge recommendations take into account each patient's current medical complexities and are subject to input/oversight from the patient's healthcare team.   Barriers to Home Discharge:   [] Steps to access home entry or bed/bath:   [] Unable to transfer, ambulate, or propel wheelchair household distances without assist   [x] Limited available assist at home upon discharge    [x] Patient or family requests d/c to post-acute facility    [x] Poor cognition/safety awareness for d/c to home alone    []Unable to maintain ordered weight bearing status    [] Patient with salient signs of long-standing immobility   [x] Patient is at risk for falls due to:   [x] Other: Decreased safety and independence w/ ADLs.      If pt is unable to be seen after this session, please let this note serve as discharge summary.  Please see case management note for discharge disposition.  Thank you.    Patient Diagnosis(es): The primary encounter diagnosis was Syncope and collapse. Diagnoses of Chronic kidney disease, unspecified CKD stage and Acute cystitis without hematuria were also pertinent to this visit.     Assessment    Assessment: Pt lethargic during treatment and requires verbal cues to keep eyes open. Co-tx collaboration this date to safely meet goals and will have better occupational performance outcomes with in a co-treatment than 1:1 session. Pt requires min A for STS, min A x 2 for fxl ambulation with RW, CGA for toilet transfer, min A for toileting, min A x 2 for oral hygiene. Pt with decreased balance during session and requires verbal cues to take bigger steps. Pt requires assist for RW management during fxl amb. BP 
Patient admitted to room 359 from ED.  Patient oriented to room, call light, bed rails, phone, lights and bathroom.  Patient instructed about the schedule of the day including: vital sign frequency, lab draws, possible tests, frequency of MD and staff rounds, including RN/MD rounding together at bedside, daily weights, and I &O's.  Patient instructed about prescribed diet, how to use 8MENU, and television. bed alarm in place, patient aware of placement and reason.   Telemetry box  in place, patient aware of placement and reason.  Bed locked, in lowest position, side rails up 2/4, call light within reach.  Will continue to monitor.     
Patient had a bowel movement but was unable to send for cdiff rule out due to being formed.  
Phlebotomist called and was unable to get blood from patient. New phlebotomist coming in at 1230 and will come to draw labs at that time.   
Physical Therapy  Facility/Department: [facility] B3 - MED SURG  Daily Treatment Note  NAME: Agustina Fried  : 1960  MRN: [id number]    Date of Service: 2024    Discharge Recommendations:  Subacute/Skilled Nursing Facility   PT Equipment Recommendations  Equipment Needed: No  Other: defer to facility    Patient Diagnosis(es): The primary encounter diagnosis was Syncope and collapse. Diagnoses of Chronic kidney disease, unspecified CKD stage and Acute cystitis without hematuria were also pertinent to this visit.    Therapy discharge recommendations take into account each patient's current medical complexities and are subject to input/oversight from the patient's healthcare team.   Barriers to Home Discharge:   [x] Steps to access home entry or bed/bath: 16   [x] Unable to transfer, ambulate, or propel wheelchair household distances without assist   [] Limited available assist at home upon discharge    [] Patient or family requests d/c to post-acute facility    [x] Poor cognition/safety awareness for d/c to home alone    []Unable to maintain ordered weight bearing status    [x] Patient with salient signs of long-standing immobility   [] Patient is at risk for falls due to:   [x] Other: lives alone     If pt is unable to be seen after this session, please let this note serve as discharge summary.  Please see case management note for discharge disposition.  Thank you.    Assessment   Assessment: Patient seen for gait and transfers training as co-treat with OT d/t level of lethargy to increase safety.  Patient cleared by RN for therapy participation this date.  Patient agreeable to therapy upon wakening. Patient completed transfers with min A. Pt ambulates with RW and min A x2 with max cues for safety and maintaining YOSSI within RW. BP stable throughout session. Pt endorses fatigue. Pt requires min A x2 for standing balance at sink while completing ADL's. P.T .will continue to follow throughout LOS.  Recommending DC 
myelopathy or radiculopathy, Transient cerebral ischemia, and Unspecified cerebral artery occlusion with cerebral infarction.  Past Surgical History:  has a past surgical history that includes Kidney removal; Hysterectomy; Breast lumpectomy (2015); Tubal ligation; other surgical history (Right); Cardiac catheterization (06/08/2020); Upper gastrointestinal endoscopy (N/A, 1/29/2021); Colonoscopy (N/A, 2/1/2021); CT BIOPSY BONE MARROW (2/3/2021); Temporal Artery Biopsy (Right, 8/9/2021); Upper gastrointestinal endoscopy (N/A, 12/15/2022); Colonoscopy (N/A, 2/27/2023); and hemicolectomy (N/A, 3/7/2023).           Assessment   Performance deficits / Impairments: Decreased functional mobility ;Decreased safe awareness;Decreased balance;Decreased ADL status;Decreased endurance;Decreased high-level IADLs;Decreased strength  Assessment: pt from home alone, admitted after found down at home ; pt requiring CGA/min assist with bathroom mobility/standing ADL's at sink; fatigues easily with minimal exertion; pt to benefit from skilled OT services;  REQUIRES OT FOLLOW-UP: Yes  Activity Tolerance  Activity Tolerance: Patient Tolerated treatment well  Activity Tolerance Comments: sitting in chair after bathroom mobility/standing ~ 3 minutes: BP = 153/72, HR = 70        Plan   Occupational Therapy Plan  Times Per Week: 2-4x week  Current Treatment Recommendations: Strengthening, Balance training, Functional mobility training, Endurance training, Safety education & training, Positioning, Self-Care / ADL     Restrictions  Restrictions/Precautions  Restrictions/Precautions: Fall Risk, General Precautions, Contact Precautions  Position Activity Restriction  Other position/activity restrictions: up with assist, telemetry, c-diff rule out,    Subjective   General  Chart Reviewed: Yes  Patient assessed for rehabilitation services?: Yes  Family / Caregiver Present: No  Referring Practitioner: Rubi Atwood MD  Diagnosis: found down, ? 
  Timed Code Treatment Minutes: 16 Minutes       Heidi Marcus, PT           
Hernia: No hernia is present.   Genitourinary:     Vagina: Normal.   Musculoskeletal:         General: No tenderness or deformity. Normal range of motion.      Cervical back: Normal range of motion and neck supple.   Lymphadenopathy:      Cervical: No cervical adenopathy.   Skin:     General: Skin is warm and dry.      Capillary Refill: Capillary refill takes less than 2 seconds.      Coloration: Skin is not pale.      Findings: No erythema or rash.   Neurological:      Mental Status: She is alert and oriented to person, place, and time.      Cranial Nerves: No cranial nerve deficit.      Motor: No abnormal muscle tone.      Coordination: Coordination normal.      Deep Tendon Reflexes: Reflexes normal.   Psychiatric:         Behavior: Behavior normal.         Thought Content: Thought content normal.         Judgment: Judgment normal.           Current Medications      insulin lispro  6 Units SubCUTAneous TID WC    insulin glargine  20 Units SubCUTAneous Nightly    insulin lispro  0-8 Units SubCUTAneous TID WC    insulin lispro  0-4 Units SubCUTAneous Nightly    amLODIPine  10 mg Oral Daily    aspirin  81 mg Oral Daily    atorvastatin  40 mg Oral Nightly    gabapentin  200 mg Oral TID    paliperidone  9 mg Oral QAM    pantoprazole  40 mg Oral Daily    traZODone  200 mg Oral Nightly    sodium chloride flush  5-40 mL IntraVENous 2 times per day    heparin (porcine)  5,000 Units SubCUTAneous BID    Fidaxomicin  200 mg Oral BID         Labs and Imaging Studies   Laboratory findings:  VL DUP CAROTID BILATERAL    Result Date: 1/19/2024  Carotid Duplex Study  Demographics   Patient Name       KEV PRADO   Date of Study      01/19/2024       Gender               Female   Patient Number     [id number]       Date of Birth        1960   Visit Number       [id number]        Age                  63 year(s)   Accession Number   [id number]       Room Number          0359   Corporate ID       [id number]         Sonographer     
Conditions  Formatting of this note might be different from the original.  SKNVL - Skin - very low  Added by RAMP Suspected Conditions      Acute right eye pain 08/08/2021    Severe malnutrition (HCC) 06/03/2021    Acute encephalopathy 03/05/2021    Muscle weakness (generalized) 02/23/2021    Cognitive communication deficit 02/08/2021    Other lack of coordination 02/08/2021    Chronic anemia 02/05/2021    Facial abscess 02/05/2021    Asymptomatic bacteriuria 02/05/2021    Hypertensive heart and chronic kidney disease with heart failure and stage 1 through stage 4 chronic kidney disease, or unspecified chronic kidney disease (HCC) 02/05/2021    Repeated falls 02/05/2021    Unintentional weight loss 11/05/2020    Hyperglycemia due to type 2 diabetes mellitus ([facility]) 10/07/2020    Hx of heart artery stent 06/19/2020    CAD S/P percutaneous coronary angioplasty 06/09/2020    Chest pain 06/05/2020    Hypomagnesemia 01/23/2020    Acute cystitis without hematuria 01/22/2020    History of CVA (cerebrovascular accident) without residual deficits 07/08/2019     Overview Note:     Last Assessment & Plan:   Condition: stable    Follow up in: one year      S/P mastectomy, right 07/08/2019     Overview Note:     Last Assessment & Plan:   Condition: stable    Follow up in: one year      Age-related nuclear cataract of both eyes 04/29/2019    Hypermetropia, bilateral 04/29/2019    Hypertensive retinopathy, bilateral 04/29/2019    Vitreous degeneration, bilateral 04/29/2019    Morbid obesity due to excess calories ([facility]) 02/19/2019    Chronic pain disorder 07/18/2018    DDD (degenerative disc disease), lumbar 07/18/2018    Diabetic polyneuropathy associated with type 2 diabetes mellitus (HCC) 07/18/2018    Other secondary scoliosis, lumbosacral region 07/18/2018    Thoracic spondylosis without myelopathy 07/18/2018    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 ([facility]) 12/19/2017    Dyslipidemia associated with type 2 diabetes 
assistance;Additional time;Adaptive equipment (using RW, bed<>BSC. Pt with LOB x 1 posteriorly requiring min A to correct. Pt requests use of RW d/t weakness)  Gait Training  Gait Training: No (not safe to attempt and pt unable to tolerate d/t increased fatigue and weakness with stand pivot transfer)                          AM-PAC - Mobility    AM-PAC Basic Mobility - Inpatient   How much help is needed turning from your back to your side while in a flat bed without using bedrails?: A Little  How much help is needed moving from lying on your back to sitting on the side of a flat bed without using bedrails?: A Little  How much help is needed moving to and from a bed to a chair?: A Little  How much help is needed standing up from a chair using your arms?: A Little  How much help is needed walking in hospital room?: A Lot  How much help is needed climbing 3-5 steps with a railing?: Total  AM-PAC Inpatient Mobility Raw Score : 15  AM-PAC Inpatient T-Scale Score : 39.45  Mobility Inpatient CMS 0-100% Score: 57.7  Mobility Inpatient CMS G-Code Modifier : CK           Goals  Short Term Goals  Time Frame for Short Term Goals: 1 week- 1/27/24  Short Term Goal 1: Pt will complete bed mobility with mod I  Short Term Goal 2: Pt will complete functional transfers with SBA using RW  Short Term Goal 3: Pt will ambulate 50ft with SBA using RW  Short Term Goal 4: Pt will participate in 10-15 reps BLE exercises for strengthening by 1/24/24  Patient Goals   Patient Goals : \"go home\"       Education  Patient Education  Education Given To: Patient  Education Provided: Role of Therapy;Plan of Care;Transfer Training;Energy Conservation;Fall Prevention Strategies;Equipment  Education Method: Verbal  Barriers to Learning: None  Education Outcome: Verbalized understanding      Therapy Time   Individual Concurrent Group Co-treatment   Time In 1120         Time Out 1208         Minutes 48         Timed Code Treatment Minutes: 38 Minutes (10 
on and taking off regular upper body clothing?: A Little  How much help is needed for taking care of personal grooming?: A Little  How much help for eating meals?: None  AM-PAC Inpatient Daily Activity Raw Score: 18  AM-PAC Inpatient ADL T-Scale Score : 38.66  ADL Inpatient CMS 0-100% Score: 46.65  ADL Inpatient CMS G-Code Modifier : CK    Therapy Time   Individual Concurrent Group Co-treatment   Time In 0946         Time Out 1027         Minutes 41         Timed Code Treatment Minutes: 41 Minutes       Abdirashid Golden OT     
reveal a <50% diameter reducing  stenosis.  The bilateral vertebral arteries demonstrate antegrade flow.   Signature   ------------------------------------------------------------------  Electronically signed by Damian Jones MD (Interpreting  physician) on 01/19/2024 at 01:52 PM  ------------------------------------------------------------------  Patient Status:ER. Study Location:[place] - [facility]. Technical Quality:Limited visualization due to depth of vessels. Risk Factors History +---------+----------+---------------------------------+ !Diagnosis!Date      !Comments                         ! +---------+----------+---------------------------------+ !Other    !07/11/2016!patient was experiencing aphasia.! !         !          !No BP due to IV placement.       ! +---------+----------+---------------------------------+ !Other    !07/06/2017!Patient had episodes of falling  ! +---------+----------+---------------------------------+   - The patient's risk factor(s) include: diabetes mellitus, dyslipidemia and     arterial hypertension.   - The patient has a former tobacco history(hasn't smoked in the past 10     years).   - The patient has breast cancer. Plaque   - A plaque was found in the Right Prox ICA. The plaque characteristics are: medium echogenicity, minimal severity and homogeneous texture. Velocities are measured in cm/s ; Diameters are measured in mm Carotid Right Measurements +---------------+----+----+-----+----+ !Location       !PSV !EDV !Angle!RI  ! +---------------+----+----+-----+----+ !Prox CCA       !96.4!10.6!52   !0.89! +---------------+----+----+-----+----+ !Mid CCA        !84.5!12.4!60   !0.85! +---------------+----+----+-----+----+ !Dist CCA       !72.7!15.5!60   !0.79! +---------------+----+----+-----+----+ !Prox ICA       !50.3!14.3!60   !0.72! +---------------+----+----+-----+----+ !Mid ICA        !66.5!11.2!60   !0.83! +---------------+----+----+-----+----+ !Dist ICA  
SYSTEM PROVIDED HISTORY: AMS TECHNOLOGIST PROVIDED HISTORY: Reason for exam:->AMS Reason for Exam: ams FINDINGS: Heart size is normal.  Mediastinal contours are normal.  Pulmonary vascularity is normal.  No focal lung consolidation noted Clips are seen on the right, from prior surgery.     No acute disease       Recent Results (from the past 24 hour(s))   EKG 12 Lead    Collection Time: 01/18/24  4:40 PM   Result Value Ref Range    Ventricular Rate 66 BPM    Atrial Rate 66 BPM    P-R Interval 162 ms    QRS Duration 70 ms    Q-T Interval 378 ms    QTc Calculation (Bazett) 396 ms    P Axis 42 degrees    R Axis -9 degrees    T Axis 25 degrees    Diagnosis       Normal sinus rhythmMinimal voltage criteria for LVH, may be normal variantNonspecific T wave abnormalityAbnormal ECGWhen compared with ECG of 18-JAN-2024 13:54,Nonspecific T wave abnormality has replaced inverted T waves in Inferior leadsConfirmed by JAVAD LEE MD ([id number]) on 1/18/2024 5:56:34 PM     Lactate, Sepsis    Collection Time: 01/18/24  4:50 PM   Result Value Ref Range    Lactic Acid, Sepsis 0.9 0.4 - 1.9 mmol/L   POCT Glucose    Collection Time: 01/18/24  7:42 PM   Result Value Ref Range    POC Glucose 176 (H) 70 - 99 mg/dl    Performed on ACCU-CHEK    Basic Metabolic Panel    Collection Time: 01/19/24  7:38 AM   Result Value Ref Range    Sodium 141 136 - 145 mmol/L    Potassium 4.7 3.5 - 5.1 mmol/L    Chloride 110 99 - 110 mmol/L    CO2 24 21 - 32 mmol/L    Anion Gap 7 3 - 16    Glucose 197 (H) 70 - 99 mg/dL    BUN 29 (H) 7 - 20 mg/dL    Creatinine 1.7 (H) 0.6 - 1.2 mg/dL    Est, Glom Filt Rate 33 (A) >60    Calcium 9.0 8.3 - 10.6 mg/dL   CBC with Auto Differential    Collection Time: 01/19/24  7:38 AM   Result Value Ref Range    WBC 7.0 4.0 - 11.0 K/uL    RBC 4.09 4.00 - 5.20 M/uL    Hemoglobin 10.9 (L) 12.0 - 16.0 g/dL    Hematocrit 35.4 (L) 36.0 - 48.0 %    MCV 86.6 80.0 - 100.0 fL    MCH 26.7 26.0 - 34.0 pg    MCHC 30.9 (L) 31.0 - 36.0 
report has been produced using speech recognition software and may contain errors related to that system including errors in grammar, punctuation, and spelling, as well as words and phrases that may be inappropriate. If there are any questions or concerns please feel free to contact the dictating provider for clarification.    
   Neutrophils Absolute 2.0 1.7 - 7.7 K/uL    Lymphocytes Absolute 2.1 1.0 - 5.1 K/uL    Monocytes Absolute 0.2 0.0 - 1.3 K/uL    Eosinophils Absolute 0.1 0.0 - 0.6 K/uL    Basophils Absolute 0.0 0.0 - 0.2 K/uL   Hepatic Function Panel    Collection Time: 01/21/24  6:36 AM   Result Value Ref Range    Total Protein 6.4 6.4 - 8.2 g/dL    Albumin 2.7 (L) 3.4 - 5.0 g/dL    Alkaline Phosphatase 383 (H) 40 - 129 U/L    ALT 27 10 - 40 U/L    AST 18 15 - 37 U/L    Total Bilirubin <0.2 0.0 - 1.0 mg/dL    Bilirubin, Direct <0.2 0.0 - 0.3 mg/dL    Bilirubin, Indirect see below 0.0 - 1.0 mg/dL   Magnesium    Collection Time: 01/21/24  6:36 AM   Result Value Ref Range    Magnesium 2.00 1.80 - 2.40 mg/dL   Phosphorus    Collection Time: 01/21/24  6:36 AM   Result Value Ref Range    Phosphorus 3.9 2.5 - 4.9 mg/dL   POCT Glucose    Collection Time: 01/21/24  7:11 AM   Result Value Ref Range    POC Glucose 222 (H) 70 - 99 mg/dl    Performed on ACCU-CHEK    POCT Glucose    Collection Time: 01/21/24 11:09 AM   Result Value Ref Range    POC Glucose 170 (H) 70 - 99 mg/dl    Performed on ACCU-CHEK            Electronically signed by Rubi Atwood MD on 1/21/2024 at 1:53 PM      Comment: Please note this report has been produced using speech recognition software and may contain errors related to that system including errors in grammar, punctuation, and spelling, as well as words and phrases that may be inappropriate. If there are any questions or concerns please feel free to contact the dictating provider for clarification.

## 2024-01-25 NOTE — DISCHARGE SUMMARY
V2.0  Discharge Summary    Name:  Agustina Fried /Age/Sex: 1960 (63 y.o. female)   Admit Date: 2024  Discharge Date: 24    MRN & CSN:  [id number] & [id number] Encounter Date and Time 24 11:20 AM EST    Attending:  Charlie Gunn MD Discharging Provider: Charlie Gunn MD       Hospital Course:     Brief HPI: Agustina Fried, a 63 y.o. female, with a history of  hypertension, hyperlipidemia, insulin-dependent diabetes mellitus, CAD, CHF, CKD stage III, CVA, anxiety/depression, solitary kidney, colonic adenocarcinoma s/p right hemicolectomy, was admitted on 2024 with complaints of had concerns including Altered Mental Status (Patient found face down by family member, attempted to move her and she lost control of her bowels and vomited, was minimally responsive but able to communicate with EMS, hx of diabetes, no hx of seizures).     Assessment and plan     Syncope and collapse, likely 2/2 orthostatic hypotension  -Bilateral carotid artery US done - <50% stenosis  -Chronically elevated troponin stable  -Consulted cardiology - echo w/o significant abnormality - no further workup planned  -Patient was significantly drowsy raising concern for seizure as well; neurology consulted - EEG negative for any epileptogenic focus.  -orthostatic hypotension has resolved with fluids  -Continuous telemetry monitoring continued  -Fall precautions continued  -PT/OT team on board-recommending subacute/skilled nursing facility - patient agreeable to placement. Patient wishes to eventual transition to assisted living  -Medically stable for discharge to SNF today, monitored yesterday because of labile blood sugars.  Apparently patient does not tolerate Humalog a lot.  Will resume the home dose of Lantus 15 units nightly along with sliding scale insulin.  Patient is eating well no episodes of hypoglycemias from poor eating but likely overreaction to Humalog.  Medically stable for discharge to SNF

## 2024-01-25 NOTE — CARE COORDINATION
CASE MANAGEMENT DISCHARGE SUMMARY      Discharge to: LTAC, located within [facility]    Precertification completed: 1/24/2024  Hospital Exemption Notification (HENS) completed: yes completed yesterday for cert      Transportation:       Medical Transport explained to pt/family. Pt/family voice no agency preference.    Agency used: [facility]   time: 1200   Ambulance form completed: Yes    Confirmed discharge plan with:     Patient: yes     Family:  yes, per pt     Facility/Agency, name:  [facility] faxed   Phone number for report to facility: [phone]     RN, name: Marlo    Note: Discharging nurse to complete [facility], reconcile [facility], and place final copy with patient's discharge packet. RN to ensure that written prescriptions for  Level II medications are sent with patient to the facility as per protocol.  Katharina Garcia RN

## 2024-02-09 ENCOUNTER — TELEPHONE (OUTPATIENT)
Dept: ENDOCRINOLOGY | Age: 64
End: 2024-02-09

## 2024-02-15 ENCOUNTER — OFFICE VISIT (OUTPATIENT)
Dept: CARDIOLOGY CLINIC | Age: 64
End: 2024-02-15
Payer: MEDICAID

## 2024-02-15 VITALS
HEART RATE: 70 BPM | BODY MASS INDEX: 22.95 KG/M2 | OXYGEN SATURATION: 96 % | HEIGHT: 63 IN | DIASTOLIC BLOOD PRESSURE: 52 MMHG | WEIGHT: 129.5 LBS | SYSTOLIC BLOOD PRESSURE: 148 MMHG

## 2024-02-15 DIAGNOSIS — Z86.73 HISTORY OF CVA (CEREBROVASCULAR ACCIDENT) WITHOUT RESIDUAL DEFICITS: ICD-10-CM

## 2024-02-15 DIAGNOSIS — E13.8 DM (DIABETES MELLITUS), SECONDARY, WITH COMPLICATIONS (HCC): ICD-10-CM

## 2024-02-15 DIAGNOSIS — I13.0 HYPERTENSIVE HEART AND KIDNEY DISEASE WITH CHRONIC SYSTOLIC CONGESTIVE HEART FAILURE AND STAGE 3B CHRONIC KIDNEY DISEASE (HCC): ICD-10-CM

## 2024-02-15 DIAGNOSIS — N18.32 HYPERTENSIVE HEART AND KIDNEY DISEASE WITH CHRONIC SYSTOLIC CONGESTIVE HEART FAILURE AND STAGE 3B CHRONIC KIDNEY DISEASE (HCC): ICD-10-CM

## 2024-02-15 DIAGNOSIS — I50.22 HYPERTENSIVE HEART AND KIDNEY DISEASE WITH CHRONIC SYSTOLIC CONGESTIVE HEART FAILURE AND STAGE 3B CHRONIC KIDNEY DISEASE (HCC): ICD-10-CM

## 2024-02-15 DIAGNOSIS — E11.69 DYSLIPIDEMIA ASSOCIATED WITH TYPE 2 DIABETES MELLITUS (HCC): ICD-10-CM

## 2024-02-15 DIAGNOSIS — Z85.3 HISTORY OF BREAST CANCER: ICD-10-CM

## 2024-02-15 DIAGNOSIS — I50.22 CHRONIC SYSTOLIC HEART FAILURE (HCC): ICD-10-CM

## 2024-02-15 DIAGNOSIS — I25.5 ISCHEMIC CARDIOMYOPATHY: ICD-10-CM

## 2024-02-15 DIAGNOSIS — I25.10 ATHEROSCLEROSIS OF NATIVE CORONARY ARTERY OF NATIVE HEART WITHOUT ANGINA PECTORIS: Primary | ICD-10-CM

## 2024-02-15 DIAGNOSIS — N18.32 STAGE 3B CHRONIC KIDNEY DISEASE (HCC): ICD-10-CM

## 2024-02-15 DIAGNOSIS — Z85.528 HISTORY OF RENAL CELL CARCINOMA: ICD-10-CM

## 2024-02-15 DIAGNOSIS — E78.5 DYSLIPIDEMIA ASSOCIATED WITH TYPE 2 DIABETES MELLITUS (HCC): ICD-10-CM

## 2024-02-15 PROCEDURE — G8427 DOCREV CUR MEDS BY ELIG CLIN: HCPCS | Performed by: NURSE PRACTITIONER

## 2024-02-15 PROCEDURE — G8484 FLU IMMUNIZE NO ADMIN: HCPCS | Performed by: NURSE PRACTITIONER

## 2024-02-15 PROCEDURE — 1111F DSCHRG MED/CURRENT MED MERGE: CPT | Performed by: NURSE PRACTITIONER

## 2024-02-15 PROCEDURE — 2022F DILAT RTA XM EVC RTNOPTHY: CPT | Performed by: NURSE PRACTITIONER

## 2024-02-15 PROCEDURE — 3077F SYST BP >= 140 MM HG: CPT | Performed by: NURSE PRACTITIONER

## 2024-02-15 PROCEDURE — 3046F HEMOGLOBIN A1C LEVEL >9.0%: CPT | Performed by: NURSE PRACTITIONER

## 2024-02-15 PROCEDURE — G8420 CALC BMI NORM PARAMETERS: HCPCS | Performed by: NURSE PRACTITIONER

## 2024-02-15 PROCEDURE — 1036F TOBACCO NON-USER: CPT | Performed by: NURSE PRACTITIONER

## 2024-02-15 PROCEDURE — 99213 OFFICE O/P EST LOW 20 MIN: CPT | Performed by: NURSE PRACTITIONER

## 2024-02-15 PROCEDURE — 3017F COLORECTAL CA SCREEN DOC REV: CPT | Performed by: NURSE PRACTITIONER

## 2024-02-15 PROCEDURE — 3078F DIAST BP <80 MM HG: CPT | Performed by: NURSE PRACTITIONER

## 2024-02-15 RX ORDER — OXYCODONE AND ACETAMINOPHEN 7.5; 325 MG/1; MG/1
1 TABLET ORAL EVERY 6 HOURS PRN
COMMUNITY
Start: 2024-01-18

## 2024-02-15 NOTE — PROGRESS NOTES
[facility]   Cardiac Evaluation    Primary Care Doctor:  Viridiana [place], APRN - NP    Chief Complaint   Patient presents with    Follow-Up from Hospital    Ambulatory Cardiac Monitoring    Cardiomyopathy    Hypertension    Fatigue    Loss of Consciousness          Assessment:    1. Atherosclerosis of native coronary artery of native heart without angina pectoris    2. Ischemic cardiomyopathy    3. Stage 3b chronic kidney disease (HCC)    4. Chronic systolic heart failure (HCC)    5. DM (diabetes mellitus), secondary, with complications (HCC)    6. Dyslipidemia associated with type 2 diabetes mellitus (HCC)    7. History of breast cancer    8. History of CVA (cerebrovascular accident) without residual deficits    9. History of renal cell carcinoma    10. 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 stage 3b chronic kidney disease (HCC)          Plan:   No change in the current heart/ BP medicines  Consider compression socks or \"calf sleeves\" for leg swelling and dizziness with standing up.  No other heart testing at this time  Follow up with Dr. Bill as planned      Vitals:    02/15/24 1041 02/15/24 1104   BP: (!) 150/50 (!) 148/52   Site: Left Upper Arm Left Upper Arm   Position: Sitting Sitting   Cuff Size: Medium Adult Medium Adult   Pulse: 70    SpO2: 96%    Weight: 58.7 kg (129 lb 8 oz)    Height: 1.6 m (5' 3\")        History of Present Illness:   I had the pleasure of seeing Agustina Fried in follow up for recent hospitalization.  Agustina Fried was hospitalized 1/18/2024 to 1/25/2024 with altered mental status.  Cardiology consulted for elevated troponin.  She was noted to have severe anemia with a hemoglobin of 6.2.  She was also treated for orthostatic hypotension which resolved after IV fluids.  Echocardiogram was completed which was reassuring.  She has a known history of CAD with prior PCI, hypertension, hyperlipidemia, T2DM, ischemic cardiomyopathy,

## 2024-02-15 NOTE — PATIENT INSTRUCTIONS
No change in the current heart/ BP medicines  Consider compression socks or \"calf sleeves\" for leg swelling and dizziness with standing up.  No other heart testing at this time  Follow up with Dr. Bill as planned

## 2024-02-16 ENCOUNTER — TELEPHONE (OUTPATIENT)
Dept: ENDOCRINOLOGY | Age: 64
End: 2024-02-16

## 2024-02-16 ENCOUNTER — HOSPITAL ENCOUNTER (EMERGENCY)
Age: 64
Discharge: HOME | End: 2024-02-16
Payer: MEDICAID

## 2024-02-16 VITALS
DIASTOLIC BLOOD PRESSURE: 65 MMHG | BODY MASS INDEX: 21.26 KG/M2 | RESPIRATION RATE: 18 BRPM | OXYGEN SATURATION: 95 % | WEIGHT: 120 LBS | TEMPERATURE: 98.4 F | HEART RATE: 67 BPM | HEIGHT: 63 IN | SYSTOLIC BLOOD PRESSURE: 109 MMHG

## 2024-02-16 DIAGNOSIS — R73.9 HYPERGLYCEMIA: Primary | ICD-10-CM

## 2024-02-16 LAB
GLUCOSE BLD-MCNC: 358 MG/DL (ref 70–99)
PERFORMED ON: ABNORMAL

## 2024-02-16 PROCEDURE — 6370000000 HC RX 637 (ALT 250 FOR IP): Performed by: NURSE PRACTITIONER

## 2024-02-16 PROCEDURE — 99284 EMERGENCY DEPT VISIT MOD MDM: CPT

## 2024-02-16 PROCEDURE — 96372 THER/PROPH/DIAG INJ SC/IM: CPT

## 2024-02-16 RX ORDER — INSULIN LISPRO 100 [IU]/ML
0-4 INJECTION, SOLUTION INTRAVENOUS; SUBCUTANEOUS
Qty: 1 ADJUSTABLE DOSE PRE-FILLED PEN SYRINGE | Refills: 0 | Status: SHIPPED | OUTPATIENT
Start: 2024-02-16 | End: 2024-02-20 | Stop reason: SDUPTHER

## 2024-02-16 RX ORDER — INSULIN LISPRO 100 [IU]/ML
15 INJECTION, SOLUTION INTRAVENOUS; SUBCUTANEOUS NIGHTLY
Qty: 1 ADJUSTABLE DOSE PRE-FILLED PEN SYRINGE | Refills: 0 | Status: SHIPPED | OUTPATIENT
Start: 2024-02-16

## 2024-02-16 RX ORDER — INSULIN GLARGINE 100 [IU]/ML
15 INJECTION, SOLUTION SUBCUTANEOUS ONCE
Status: COMPLETED | OUTPATIENT
Start: 2024-02-16 | End: 2024-02-16

## 2024-02-16 RX ADMIN — INSULIN GLARGINE 15 UNITS: 100 INJECTION, SOLUTION SUBCUTANEOUS at 21:10

## 2024-02-16 ASSESSMENT — PAIN - FUNCTIONAL ASSESSMENT: PAIN_FUNCTIONAL_ASSESSMENT: NONE - DENIES PAIN

## 2024-02-16 NOTE — TELEPHONE ENCOUNTER
I received a phone call on Friday from a [facility] physician Dr. Drea Rodriguez.  She stated that patient has vision problems and would be easier for her to use insulin pens instead of vials.  I advised that I we will send a message for Dr. Franklin regarding this request.  Dr. Rodriguez also will contact care coordinator to schedule a follow-up appointment.

## 2024-02-18 NOTE — ED PROVIDER NOTES
prescription for a flex pen.  Patient return the ED for emergent worsening symptoms her vital signs are stable and with no other complaints I do not feel that further workup necessary.  Patient agreed with plan for discharge home.          I am the Primary Clinician of Record.  FINAL IMPRESSION      1. Hyperglycemia          DISPOSITION/PLAN     DISPOSITION Decision to Discharge    PATIENT REFERRED TO:  Viridiana [place] APRN - NP  [address] [place], [place] [id number]  [phone]    Call   For follow up      DISCHARGE MEDICATIONS:  Discharge Medication List as of 2/16/2024  9:05 PM        START taking these medications    Details   !! insulin lispro, 1 Unit Dial, (HUMALOG KWIKPEN) 100 UNIT/ML SOPN Inject 0-4 Units into the skin 3 times daily (before meals), Disp-1 Adjustable Dose Pre-filled Pen Syringe, R-0Normal      !! insulin lispro, 1 Unit Dial, (HUMALOG KWIKPEN) 100 UNIT/ML SOPN Inject 15 Units into the skin nightly, Disp-1 Adjustable Dose Pre-filled Pen Syringe, R-0Normal       !! - Potential duplicate medications found. Please discuss with provider.          DISCONTINUED MEDICATIONS:  Discharge Medication List as of 2/16/2024  9:05 PM                 (Please note that portions of this note were completed with a voice recognition program.  Efforts were made to edit the dictations but occasionally words are mis-transcribed.)    SARTHAK Lentz CNP (electronically signed)       Ang Petersen APRN - CNP  02/18/24 0050

## 2024-02-19 NOTE — TELEPHONE ENCOUNTER
LMOM to contact the office.  Is she ok with RX for insulin pens instead of vials?  Also need to schedule follow up.

## 2024-02-20 DIAGNOSIS — R55 SYNCOPE AND COLLAPSE: ICD-10-CM

## 2024-02-20 RX ORDER — INSULIN GLARGINE 100 [IU]/ML
15 INJECTION, SOLUTION SUBCUTANEOUS NIGHTLY
Qty: 5 ADJUSTABLE DOSE PRE-FILLED PEN SYRINGE | Refills: 3 | Status: SHIPPED | OUTPATIENT
Start: 2024-02-20

## 2024-02-20 RX ORDER — INSULIN LISPRO 100 [IU]/ML
0-4 INJECTION, SOLUTION INTRAVENOUS; SUBCUTANEOUS
Qty: 1 ADJUSTABLE DOSE PRE-FILLED PEN SYRINGE | Refills: 0 | Status: SHIPPED | OUTPATIENT
Start: 2024-02-20

## 2024-02-21 ENCOUNTER — TELEPHONE (OUTPATIENT)
Dept: CARDIOLOGY CLINIC | Age: 64
End: 2024-02-21

## 2024-02-21 NOTE — TELEPHONE ENCOUNTER
----- Message from Jose De La Vega MD sent at 2/20/2024  4:23 PM EST -----  See scanned report to communicate findings/plans to patient. Thanks.

## 2024-02-22 NOTE — TELEPHONE ENCOUNTER
Spoke with Agustina, relayed message. She states she sees Avinash cardio tomorrow. She will also have him look at the results.

## 2024-02-28 ENCOUNTER — OFFICE VISIT (OUTPATIENT)
Dept: ENDOCRINOLOGY | Age: 64
End: 2024-02-28
Payer: MEDICAID

## 2024-02-28 VITALS
OXYGEN SATURATION: 99 % | SYSTOLIC BLOOD PRESSURE: 142 MMHG | DIASTOLIC BLOOD PRESSURE: 64 MMHG | HEIGHT: 63 IN | WEIGHT: 127 LBS | HEART RATE: 68 BPM | BODY MASS INDEX: 22.5 KG/M2

## 2024-02-28 DIAGNOSIS — E11.42 DIABETIC POLYNEUROPATHY ASSOCIATED WITH TYPE 2 DIABETES MELLITUS (HCC): ICD-10-CM

## 2024-02-28 DIAGNOSIS — E11.49 DIABETIC NEUROPATHY WITH NEUROLOGIC COMPLICATION (HCC): ICD-10-CM

## 2024-02-28 DIAGNOSIS — E11.65 TYPE 2 DIABETES MELLITUS WITH HYPERGLYCEMIA, WITH LONG-TERM CURRENT USE OF INSULIN (HCC): Primary | ICD-10-CM

## 2024-02-28 DIAGNOSIS — E11.40 DIABETIC NEUROPATHY WITH NEUROLOGIC COMPLICATION (HCC): ICD-10-CM

## 2024-02-28 DIAGNOSIS — E11.29 TYPE 2 DIABETES MELLITUS WITH OTHER DIABETIC KIDNEY COMPLICATION (HCC): ICD-10-CM

## 2024-02-28 DIAGNOSIS — E11.69 DYSLIPIDEMIA ASSOCIATED WITH TYPE 2 DIABETES MELLITUS (HCC): ICD-10-CM

## 2024-02-28 DIAGNOSIS — E78.5 DYSLIPIDEMIA ASSOCIATED WITH TYPE 2 DIABETES MELLITUS (HCC): ICD-10-CM

## 2024-02-28 DIAGNOSIS — Z79.4 TYPE 2 DIABETES MELLITUS WITH HYPERGLYCEMIA, WITH LONG-TERM CURRENT USE OF INSULIN (HCC): Primary | ICD-10-CM

## 2024-02-28 DIAGNOSIS — E11.59 TYPE 2 DIABETES MELLITUS WITH VASCULAR DISEASE (HCC): ICD-10-CM

## 2024-02-28 PROCEDURE — 3078F DIAST BP <80 MM HG: CPT | Performed by: INTERNAL MEDICINE

## 2024-02-28 PROCEDURE — 99214 OFFICE O/P EST MOD 30 MIN: CPT | Performed by: INTERNAL MEDICINE

## 2024-02-28 PROCEDURE — 1036F TOBACCO NON-USER: CPT | Performed by: INTERNAL MEDICINE

## 2024-02-28 PROCEDURE — 3046F HEMOGLOBIN A1C LEVEL >9.0%: CPT | Performed by: INTERNAL MEDICINE

## 2024-02-28 PROCEDURE — 2022F DILAT RTA XM EVC RTNOPTHY: CPT | Performed by: INTERNAL MEDICINE

## 2024-02-28 PROCEDURE — G8420 CALC BMI NORM PARAMETERS: HCPCS | Performed by: INTERNAL MEDICINE

## 2024-02-28 PROCEDURE — G8484 FLU IMMUNIZE NO ADMIN: HCPCS | Performed by: INTERNAL MEDICINE

## 2024-02-28 PROCEDURE — 3077F SYST BP >= 140 MM HG: CPT | Performed by: INTERNAL MEDICINE

## 2024-02-28 PROCEDURE — G8427 DOCREV CUR MEDS BY ELIG CLIN: HCPCS | Performed by: INTERNAL MEDICINE

## 2024-02-28 PROCEDURE — 3017F COLORECTAL CA SCREEN DOC REV: CPT | Performed by: INTERNAL MEDICINE

## 2024-02-28 RX ORDER — CEFDINIR 300 MG/1
300 CAPSULE ORAL DAILY
COMMUNITY
Start: 2024-02-21

## 2024-02-28 NOTE — PROGRESS NOTES
Patient ID:   Agustina Fried is a 63 y.o. female  Chief Complaint:   Agustina Fried presents for an evaluation of Type 2 Diabetes Mellitus , Hyperlipidemia and hypertension.     Subjective:   Type 2 Diabetes Mellitus diagnosed around 2010  On insulin since 2012  Previous regimen:Taking Admelog 15 units tidac and 5 units for snacks. Basaglar 40 units once a day at night.   VGO stopped due to hypoglycemias     Multiple hospitalizations in 2021 due to weakness, anemia   She is recently released from a nursing home     Not taking:   Synjardy 12.5-1000mg, two tabs in am. Last pick ups: Aug 2020 and Feb 2021.     Admits making poor dietary choices, trying to cut down fried.     colon cancer , sp surgery - right colectomy March 2023. No chemo needed as per the patient.     Hypoglycemia in Jan 2024. Stopped Victoza 1.8 mg daily in AM  Glimepiride 4 mg with breakfast .      Currently on   Getting more tests for colon cancer (Imaging and labs)    Lantus 8 units daily in am. Denies missing it now. Previously she was missing it 4-5 days a week    Novolog 4 units with each meal   Not using following scale                   With meals (during the day)   - at bedtime    < 150 ---    3 units                                 0 units  151-200 --  4 units                                 0 units  201-250 :    5 units                                 1 units  251-300:     6 units                                 2 units  301-350:     7 units                                 3 units  >350 :         8   units                               4 units      Multiple cancellations and no shows   She has work on keeping the appointments so insulin regimen can be adjusted     She thinks she is eating too much bread, pasta     Using Joan 2    She forgot meter     Checks blood sugars 2-3 times per day.       AM:   Lunch:   Supper:    HS:        Hypoglycemias: Once in last 4 weeks      Meals: 3, dinner is big. Snacks (jellos, fruits, pretzels) after

## 2024-03-07 NOTE — TELEPHONE ENCOUNTER
Refill request received from [facility] for Glimepiride 4mg.  Fax sent back stating medication has been discontinued.

## 2024-04-12 ENCOUNTER — HOSPITAL ENCOUNTER (INPATIENT)
Age: 64
LOS: 13 days | Discharge: OTHER INSTITUTION | End: 2024-04-25
Attending: EMERGENCY MEDICINE | Admitting: INTERNAL MEDICINE
Payer: MEDICAID

## 2024-04-12 DIAGNOSIS — R73.9 HYPERGLYCEMIA: Primary | ICD-10-CM

## 2024-04-12 DIAGNOSIS — D64.9 ANEMIA, UNSPECIFIED TYPE: ICD-10-CM

## 2024-04-12 DIAGNOSIS — G89.4 CHRONIC PAIN SYNDROME: ICD-10-CM

## 2024-04-12 DIAGNOSIS — F41.9 ANXIETY: ICD-10-CM

## 2024-04-12 DIAGNOSIS — R11.2 NAUSEA AND VOMITING, UNSPECIFIED VOMITING TYPE: ICD-10-CM

## 2024-04-12 PROBLEM — E11.00 HYPEROSMOLAR HYPERGLYCEMIC STATE (HHS) (HCC): Status: ACTIVE | Noted: 2024-04-12

## 2024-04-12 LAB
ALBUMIN SERPL-MCNC: 2.7 G/DL (ref 3.4–5)
ALBUMIN SERPL-MCNC: 3.1 G/DL (ref 3.4–5)
ALBUMIN/GLOB SERPL: 0.6 {RATIO} (ref 1.1–2.2)
ALBUMIN/GLOB SERPL: 0.6 {RATIO} (ref 1.1–2.2)
ALP SERPL-CCNC: 214 U/L (ref 40–129)
ALP SERPL-CCNC: 247 U/L (ref 40–129)
ALT SERPL-CCNC: 21 U/L (ref 10–40)
ALT SERPL-CCNC: 24 U/L (ref 10–40)
AMORPH SED URNS QL MICRO: ABNORMAL /HPF
ANION GAP SERPL CALCULATED.3IONS-SCNC: 11 MMOL/L (ref 3–16)
ANION GAP SERPL CALCULATED.3IONS-SCNC: 12 MMOL/L (ref 3–16)
AST SERPL-CCNC: 24 U/L (ref 15–37)
AST SERPL-CCNC: 25 U/L (ref 15–37)
BACTERIA URNS QL MICRO: ABNORMAL /HPF
BASE EXCESS BLDV CALC-SCNC: -0.6 MMOL/L (ref -3–3)
BASE EXCESS BLDV CALC-SCNC: -2.8 MMOL/L (ref -3–3)
BASOPHILS # BLD: 0 K/UL (ref 0–0.2)
BASOPHILS NFR BLD: 0.3 %
BILIRUB SERPL-MCNC: 0.3 MG/DL (ref 0–1)
BILIRUB SERPL-MCNC: <0.2 MG/DL (ref 0–1)
BILIRUB UR QL STRIP.AUTO: NEGATIVE
BUN SERPL-MCNC: 23 MG/DL (ref 7–20)
BUN SERPL-MCNC: 27 MG/DL (ref 7–20)
CALCIUM SERPL-MCNC: 8.6 MG/DL (ref 8.3–10.6)
CALCIUM SERPL-MCNC: 8.8 MG/DL (ref 8.3–10.6)
CHLORIDE SERPL-SCNC: 84 MMOL/L (ref 99–110)
CHLORIDE SERPL-SCNC: 93 MMOL/L (ref 99–110)
CLARITY UR: CLEAR
CO2 BLDV-SCNC: 26 MMOL/L
CO2 BLDV-SCNC: 27 MMOL/L
CO2 SERPL-SCNC: 25 MMOL/L (ref 21–32)
CO2 SERPL-SCNC: 25 MMOL/L (ref 21–32)
COHGB MFR BLDV: 2.2 % (ref 0–1.5)
COHGB MFR BLDV: 3.2 % (ref 0–1.5)
COLOR UR: ABNORMAL
CREAT SERPL-MCNC: 1.4 MG/DL (ref 0.6–1.2)
CREAT SERPL-MCNC: 1.6 MG/DL (ref 0.6–1.2)
DEPRECATED RDW RBC AUTO: 13.6 % (ref 12.4–15.4)
EOSINOPHIL # BLD: 0 K/UL (ref 0–0.6)
EOSINOPHIL NFR BLD: 0.3 %
EPI CELLS #/AREA URNS HPF: ABNORMAL /HPF (ref 0–5)
GFR SERPLBLD CREATININE-BSD FMLA CKD-EPI: 36 ML/MIN/{1.73_M2}
GFR SERPLBLD CREATININE-BSD FMLA CKD-EPI: 42 ML/MIN/{1.73_M2}
GLUCOSE BLD-MCNC: 403 MG/DL (ref 70–99)
GLUCOSE BLD-MCNC: 523 MG/DL (ref 70–99)
GLUCOSE BLD-MCNC: 593 MG/DL (ref 70–99)
GLUCOSE BLD-MCNC: >600 MG/DL (ref 70–99)
GLUCOSE SERPL-MCNC: 1011 MG/DL (ref 70–99)
GLUCOSE SERPL-MCNC: 557 MG/DL (ref 70–99)
GLUCOSE UR STRIP.AUTO-MCNC: >=1000 MG/DL
HCO3 BLDV-SCNC: 24 MMOL/L (ref 23–29)
HCO3 BLDV-SCNC: 25.8 MMOL/L (ref 23–29)
HCT VFR BLD AUTO: 33.5 % (ref 36–48)
HGB BLD-MCNC: 10.3 G/DL (ref 12–16)
HGB UR QL STRIP.AUTO: ABNORMAL
HYALINE CASTS #/AREA URNS LPF: ABNORMAL /LPF (ref 0–2)
KETONES UR STRIP.AUTO-MCNC: NEGATIVE MG/DL
LACTATE BLDV-SCNC: 2.1 MMOL/L (ref 0.4–1.9)
LACTATE BLDV-SCNC: 3.7 MMOL/L (ref 0.4–1.9)
LEUKOCYTE ESTERASE UR QL STRIP.AUTO: NEGATIVE
LIPASE SERPL-CCNC: 4 U/L (ref 13–60)
LYMPHOCYTES # BLD: 0.7 K/UL (ref 1–5.1)
LYMPHOCYTES NFR BLD: 14.4 %
MAGNESIUM SERPL-MCNC: 2.1 MG/DL (ref 1.8–2.4)
MCH RBC QN AUTO: 27.2 PG (ref 26–34)
MCHC RBC AUTO-ENTMCNC: 30.7 G/DL (ref 31–36)
MCV RBC AUTO: 88.9 FL (ref 80–100)
METHGB MFR BLDV: 0 %
METHGB MFR BLDV: 0.1 %
MONOCYTES # BLD: 0.3 K/UL (ref 0–1.3)
MONOCYTES NFR BLD: 6.1 %
NEUTROPHILS # BLD: 4.1 K/UL (ref 1.7–7.7)
NEUTROPHILS NFR BLD: 78.9 %
NITRITE UR QL STRIP.AUTO: NEGATIVE
O2 THERAPY: ABNORMAL
O2 THERAPY: ABNORMAL
PCO2 BLDV: 50.6 MMHG (ref 40–50)
PCO2 BLDV: 51 MMHG (ref 40–50)
PERFORMED ON: ABNORMAL
PH BLDV: 7.29 [PH] (ref 7.35–7.45)
PH BLDV: 7.33 [PH] (ref 7.35–7.45)
PH UR STRIP.AUTO: 6 [PH] (ref 5–8)
PHOSPHATE SERPL-MCNC: 2.7 MG/DL (ref 2.5–4.9)
PHOSPHATE SERPL-MCNC: 3.5 MG/DL (ref 2.5–4.9)
PLATELET # BLD AUTO: 187 K/UL (ref 135–450)
PMV BLD AUTO: 10.7 FL (ref 5–10.5)
PO2 BLDV: 28.2 MMHG (ref 25–40)
PO2 BLDV: 40.7 MMHG (ref 25–40)
POTASSIUM SERPL-SCNC: 4 MMOL/L (ref 3.5–5.1)
POTASSIUM SERPL-SCNC: 4.3 MMOL/L (ref 3.5–5.1)
PROT SERPL-MCNC: 6.9 G/DL (ref 6.4–8.2)
PROT SERPL-MCNC: 8.1 G/DL (ref 6.4–8.2)
PROT UR STRIP.AUTO-MCNC: 100 MG/DL
RBC # BLD AUTO: 3.77 M/UL (ref 4–5.2)
RBC #/AREA URNS HPF: ABNORMAL /HPF (ref 0–4)
SAO2 % BLDV: 50 %
SAO2 % BLDV: 76 %
SODIUM SERPL-SCNC: 121 MMOL/L (ref 136–145)
SODIUM SERPL-SCNC: 129 MMOL/L (ref 136–145)
SP GR UR STRIP.AUTO: 1.01 (ref 1–1.03)
UA COMPLETE W REFLEX CULTURE PNL UR: ABNORMAL
UA DIPSTICK W REFLEX MICRO PNL UR: YES
URN SPEC COLLECT METH UR: ABNORMAL
UROBILINOGEN UR STRIP-ACNC: 0.2 E.U./DL
WBC # BLD AUTO: 5.1 K/UL (ref 4–11)
WBC #/AREA URNS HPF: ABNORMAL /HPF (ref 0–5)

## 2024-04-12 PROCEDURE — 82803 BLOOD GASES ANY COMBINATION: CPT

## 2024-04-12 PROCEDURE — 96374 THER/PROPH/DIAG INJ IV PUSH: CPT

## 2024-04-12 PROCEDURE — 83735 ASSAY OF MAGNESIUM: CPT

## 2024-04-12 PROCEDURE — 6360000002 HC RX W HCPCS: Performed by: EMERGENCY MEDICINE

## 2024-04-12 PROCEDURE — 99285 EMERGENCY DEPT VISIT HI MDM: CPT

## 2024-04-12 PROCEDURE — 2060000000 HC ICU INTERMEDIATE R&B

## 2024-04-12 PROCEDURE — 84100 ASSAY OF PHOSPHORUS: CPT

## 2024-04-12 PROCEDURE — 36415 COLL VENOUS BLD VENIPUNCTURE: CPT

## 2024-04-12 PROCEDURE — 83605 ASSAY OF LACTIC ACID: CPT

## 2024-04-12 PROCEDURE — 6360000002 HC RX W HCPCS: Performed by: INTERNAL MEDICINE

## 2024-04-12 PROCEDURE — 93005 ELECTROCARDIOGRAM TRACING: CPT | Performed by: EMERGENCY MEDICINE

## 2024-04-12 PROCEDURE — 81001 URINALYSIS AUTO W/SCOPE: CPT

## 2024-04-12 PROCEDURE — 96375 TX/PRO/DX INJ NEW DRUG ADDON: CPT

## 2024-04-12 PROCEDURE — 2580000003 HC RX 258: Performed by: EMERGENCY MEDICINE

## 2024-04-12 PROCEDURE — 6370000000 HC RX 637 (ALT 250 FOR IP): Performed by: INTERNAL MEDICINE

## 2024-04-12 PROCEDURE — 6370000000 HC RX 637 (ALT 250 FOR IP): Performed by: EMERGENCY MEDICINE

## 2024-04-12 PROCEDURE — 2580000003 HC RX 258: Performed by: INTERNAL MEDICINE

## 2024-04-12 PROCEDURE — 83690 ASSAY OF LIPASE: CPT

## 2024-04-12 PROCEDURE — 96361 HYDRATE IV INFUSION ADD-ON: CPT

## 2024-04-12 PROCEDURE — 85025 COMPLETE CBC W/AUTO DIFF WBC: CPT

## 2024-04-12 PROCEDURE — 83036 HEMOGLOBIN GLYCOSYLATED A1C: CPT

## 2024-04-12 PROCEDURE — 80053 COMPREHEN METABOLIC PANEL: CPT

## 2024-04-12 RX ORDER — ASPIRIN 81 MG/1
81 TABLET, CHEWABLE ORAL DAILY
Status: DISCONTINUED | OUTPATIENT
Start: 2024-04-12 | End: 2024-04-18

## 2024-04-12 RX ORDER — POTASSIUM CHLORIDE 7.45 MG/ML
10 INJECTION INTRAVENOUS PRN
Status: DISCONTINUED | OUTPATIENT
Start: 2024-04-12 | End: 2024-04-25 | Stop reason: HOSPADM

## 2024-04-12 RX ORDER — PANTOPRAZOLE SODIUM 40 MG/1
40 TABLET, DELAYED RELEASE ORAL DAILY
Status: DISCONTINUED | OUTPATIENT
Start: 2024-04-12 | End: 2024-04-25 | Stop reason: HOSPADM

## 2024-04-12 RX ORDER — ISOSORBIDE MONONITRATE 30 MG/1
30 TABLET, EXTENDED RELEASE ORAL DAILY
Status: DISCONTINUED | OUTPATIENT
Start: 2024-04-12 | End: 2024-04-25

## 2024-04-12 RX ORDER — OXYCODONE AND ACETAMINOPHEN 7.5; 325 MG/1; MG/1
1 TABLET ORAL EVERY 6 HOURS PRN
Status: DISCONTINUED | OUTPATIENT
Start: 2024-04-12 | End: 2024-04-25 | Stop reason: HOSPADM

## 2024-04-12 RX ORDER — POLYETHYLENE GLYCOL 3350 17 G/17G
17 POWDER, FOR SOLUTION ORAL DAILY PRN
Status: DISCONTINUED | OUTPATIENT
Start: 2024-04-12 | End: 2024-04-25 | Stop reason: HOSPADM

## 2024-04-12 RX ORDER — ONDANSETRON 2 MG/ML
4 INJECTION INTRAMUSCULAR; INTRAVENOUS ONCE
Status: COMPLETED | OUTPATIENT
Start: 2024-04-12 | End: 2024-04-12

## 2024-04-12 RX ORDER — 0.9 % SODIUM CHLORIDE 0.9 %
1000 INTRAVENOUS SOLUTION INTRAVENOUS ONCE
Status: DISCONTINUED | OUTPATIENT
Start: 2024-04-12 | End: 2024-04-19

## 2024-04-12 RX ORDER — INSULIN GLARGINE 100 [IU]/ML
15 INJECTION, SOLUTION SUBCUTANEOUS NIGHTLY
Status: DISCONTINUED | OUTPATIENT
Start: 2024-04-12 | End: 2024-04-17

## 2024-04-12 RX ORDER — SODIUM CHLORIDE, SODIUM LACTATE, POTASSIUM CHLORIDE, AND CALCIUM CHLORIDE .6; .31; .03; .02 G/100ML; G/100ML; G/100ML; G/100ML
1000 INJECTION, SOLUTION INTRAVENOUS ONCE
Status: COMPLETED | OUTPATIENT
Start: 2024-04-12 | End: 2024-04-12

## 2024-04-12 RX ORDER — INSULIN LISPRO 100 [IU]/ML
0-8 INJECTION, SOLUTION INTRAVENOUS; SUBCUTANEOUS
Status: DISCONTINUED | OUTPATIENT
Start: 2024-04-12 | End: 2024-04-25 | Stop reason: HOSPADM

## 2024-04-12 RX ORDER — HYDRALAZINE HYDROCHLORIDE 20 MG/ML
10 INJECTION INTRAMUSCULAR; INTRAVENOUS EVERY 6 HOURS PRN
Status: DISCONTINUED | OUTPATIENT
Start: 2024-04-12 | End: 2024-04-25 | Stop reason: HOSPADM

## 2024-04-12 RX ORDER — MAGNESIUM SULFATE IN WATER 40 MG/ML
2000 INJECTION, SOLUTION INTRAVENOUS PRN
Status: DISCONTINUED | OUTPATIENT
Start: 2024-04-12 | End: 2024-04-25 | Stop reason: HOSPADM

## 2024-04-12 RX ORDER — 0.9 % SODIUM CHLORIDE 0.9 %
15 INTRAVENOUS SOLUTION INTRAVENOUS ONCE
Status: DISCONTINUED | OUTPATIENT
Start: 2024-04-12 | End: 2024-04-19

## 2024-04-12 RX ORDER — SODIUM CHLORIDE 9 MG/ML
INJECTION, SOLUTION INTRAVENOUS CONTINUOUS
Status: DISCONTINUED | OUTPATIENT
Start: 2024-04-12 | End: 2024-04-19

## 2024-04-12 RX ORDER — ATORVASTATIN CALCIUM 40 MG/1
40 TABLET, FILM COATED ORAL NIGHTLY
Status: DISCONTINUED | OUTPATIENT
Start: 2024-04-12 | End: 2024-04-25 | Stop reason: HOSPADM

## 2024-04-12 RX ORDER — SODIUM CHLORIDE 9 MG/ML
INJECTION, SOLUTION INTRAVENOUS CONTINUOUS
Status: DISCONTINUED | OUTPATIENT
Start: 2024-04-12 | End: 2024-04-18

## 2024-04-12 RX ORDER — ENOXAPARIN SODIUM 100 MG/ML
30 INJECTION SUBCUTANEOUS DAILY
Status: DISCONTINUED | OUTPATIENT
Start: 2024-04-12 | End: 2024-04-14

## 2024-04-12 RX ORDER — INSULIN LISPRO 100 [IU]/ML
0-4 INJECTION, SOLUTION INTRAVENOUS; SUBCUTANEOUS NIGHTLY
Status: DISCONTINUED | OUTPATIENT
Start: 2024-04-12 | End: 2024-04-25 | Stop reason: HOSPADM

## 2024-04-12 RX ORDER — DEXTROSE AND SODIUM CHLORIDE 5; .45 G/100ML; G/100ML
INJECTION, SOLUTION INTRAVENOUS CONTINUOUS PRN
Status: DISCONTINUED | OUTPATIENT
Start: 2024-04-12 | End: 2024-04-12

## 2024-04-12 RX ORDER — PALIPERIDONE 3 MG/1
9 TABLET, EXTENDED RELEASE ORAL EVERY MORNING
Status: DISCONTINUED | OUTPATIENT
Start: 2024-04-13 | End: 2024-04-16

## 2024-04-12 RX ORDER — TRAZODONE HYDROCHLORIDE 50 MG/1
300 TABLET ORAL NIGHTLY
Status: DISCONTINUED | OUTPATIENT
Start: 2024-04-12 | End: 2024-04-25 | Stop reason: HOSPADM

## 2024-04-12 RX ORDER — AMLODIPINE BESYLATE 5 MG/1
10 TABLET ORAL DAILY
Status: DISCONTINUED | OUTPATIENT
Start: 2024-04-12 | End: 2024-04-22

## 2024-04-12 RX ADMIN — OXYCODONE AND ACETAMINOPHEN 1 TABLET: 7.5; 325 TABLET ORAL at 21:19

## 2024-04-12 RX ADMIN — ASPIRIN 81 MG 81 MG: 81 TABLET ORAL at 19:24

## 2024-04-12 RX ADMIN — ENOXAPARIN SODIUM 30 MG: 100 INJECTION SUBCUTANEOUS at 19:24

## 2024-04-12 RX ADMIN — TRAZODONE HYDROCHLORIDE 300 MG: 50 TABLET ORAL at 20:19

## 2024-04-12 RX ADMIN — SODIUM CHLORIDE: 9 INJECTION, SOLUTION INTRAVENOUS at 20:12

## 2024-04-12 RX ADMIN — INSULIN LISPRO 4 UNITS: 100 INJECTION, SOLUTION INTRAVENOUS; SUBCUTANEOUS at 20:19

## 2024-04-12 RX ADMIN — ATORVASTATIN CALCIUM 40 MG: 40 TABLET, FILM COATED ORAL at 20:19

## 2024-04-12 RX ADMIN — AMLODIPINE BESYLATE 10 MG: 5 TABLET ORAL at 19:24

## 2024-04-12 RX ADMIN — INSULIN HUMAN 10 UNITS: 100 INJECTION, SOLUTION PARENTERAL at 14:27

## 2024-04-12 RX ADMIN — INSULIN GLARGINE 15 UNITS: 100 INJECTION, SOLUTION SUBCUTANEOUS at 20:20

## 2024-04-12 RX ADMIN — SODIUM CHLORIDE, POTASSIUM CHLORIDE, SODIUM LACTATE AND CALCIUM CHLORIDE 1000 ML: 600; 310; 30; 20 INJECTION, SOLUTION INTRAVENOUS at 14:25

## 2024-04-12 RX ADMIN — ONDANSETRON 4 MG: 2 INJECTION INTRAMUSCULAR; INTRAVENOUS at 14:25

## 2024-04-12 ASSESSMENT — PAIN SCALES - GENERAL
PAINLEVEL_OUTOF10: 7
PAINLEVEL_OUTOF10: 0

## 2024-04-12 ASSESSMENT — PAIN DESCRIPTION - ORIENTATION: ORIENTATION: LOWER

## 2024-04-12 ASSESSMENT — PAIN - FUNCTIONAL ASSESSMENT
PAIN_FUNCTIONAL_ASSESSMENT: ACTIVITIES ARE NOT PREVENTED
PAIN_FUNCTIONAL_ASSESSMENT: 0-10

## 2024-04-12 ASSESSMENT — PAIN DESCRIPTION - LOCATION: LOCATION: BACK

## 2024-04-12 ASSESSMENT — LIFESTYLE VARIABLES
HOW OFTEN DO YOU HAVE A DRINK CONTAINING ALCOHOL: NEVER
HOW MANY STANDARD DRINKS CONTAINING ALCOHOL DO YOU HAVE ON A TYPICAL DAY: PATIENT DOES NOT DRINK

## 2024-04-12 ASSESSMENT — PAIN DESCRIPTION - DESCRIPTORS: DESCRIPTORS: ACHING

## 2024-04-12 NOTE — H&P
Corky is a 64 y.o. female with pmh of type II DM, CAD, stage III CKD, who presents with not feeling well last several dose.  Her glucometer was reading high.  She gives history of excessive thirst and urination for last 2 days.  She also states her tongue started flailing and was not able to talk straight.  Per patient, she has been compliant with her insulin regimen at home.    When seen in the emergency room, she appears extremely dry.  She is not a very good historian.  Denies any abdominal pain, chest pain, dizziness or lightheadedness.    Workup in the emergency room revealed very high blood glucose of 1011 mg/DL, negative ketones and normal anion gap.     Review of Systems: Need 10 Elements   Review of Systems    Negative if not mentioned above    Objective:   No intake or output data in the 24 hours ending 04/12/24 1719   Vitals:   Vitals:    04/12/24 1528 04/12/24 1529 04/12/24 1545 04/12/24 1617   BP: (!) 176/67 (!) 176/67  (!) 182/76   Pulse: 67 64 56 57   Resp: 16 18 18   Temp:       TempSrc:       SpO2: 99% 99%  100%   Weight:       Height:           Medications Prior to Admission     Prior to Admission medications    Medication Sig Start Date End Date Taking? Authorizing Provider   cefdinir (OMNICEF) 300 MG capsule Take 1 capsule by mouth daily 2/21/24   ProviderImani MD   Continuous Blood Gluc Sensor (FREESTYLE CHRISTY 2 SENSOR) MISC Replace every 2 weeks 2/28/24   Anny Franklin MD   insulin lispro, 1 Unit Dial, (HUMALOG KWIKPEN) 100 UNIT/ML SOPN Inject 0-4 Units into the skin 3 times daily (before meals) 2/20/24   Anny Franklin MD   insulin glargine (LANTUS SOLOSTAR) 100 UNIT/ML injection pen Inject 15 Units into the skin nightly  Patient taking differently: Inject 8 Units into the skin nightly 2/20/24   Anny Franklin MD   oxyCODONE-acetaminophen (PERCOCET) 7.5-325 MG per tablet Take 1 tablet by mouth every 6 hours as needed for Pain. 1/18/24   Provider  auscultation  Gastrointestinal: Soft, non tender  Genitourinary: no suprapubic tenderness  Musculoskeletal: No edema  Skin: warm, dry  Neuro: Alert.  Psych: Mood appropriate.       Past Medical History:   PMHx   Past Medical History:   Diagnosis Date    Abnormal brain MRI 7/20/2017    Partially empty sella and minimal chronic small vessel ischemic disease    Acute bilateral low back pain without sciatica 11/2/2016    SHARRON (acute kidney injury) ([facility]) 7/5/2017    Arthritis     back    Bipolar disorder ([facility]) 10/18/2008    CAD (coronary artery disease)     stent placed 6/8/20    Cancer ([facility]) 2015    bilateral breast:s/p lumpectomy/radiation:under care care of breast specialist:Dr. Boone     Carotid stenosis, bilateral:<50%:per US 7/2016 7/15/2016    Carpal tunnel syndrome 10/18/2008    Cervical cancer screening 2014    Nml per pt'.    Coronary artery disease of native artery of native heart with stable angina pectoris ([facility]) 6/9/2020    DDD (degenerative disc disease), lumbar 7/18/2018    Depression     under care of pschiatrist:Dr. Rojas    Depression/anxiety 7/5/2017    Depression/anxiety     Diabetes mellitus ([facility])     Gout     History of mammogram 10/28/2016;8/14/17    Negative    History of therapeutic radiation     Hyperlipidemia     Hypertension     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 stage 3 chronic kidney disease ([facility]) 9/17/2017    Microalbuminuria 7/1/2016    Neuropathy in diabetes ([facility])     Non morbid obesity 7/1/2016    Pancreatitis 5/12/16    [facility] hospitalization 5/12/16-5/16/16:under care of GI:chronic pancreatitis    S/P endoscopy 6/14/2016    [facility]:per pt' & her family member was nml.    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 myelopathy or radiculopathy 3/10/2017    Transient cerebral ischemia 07/15/2016    TIA:7/10/16    Unspecified cerebral artery occlusion with cerebral infarction     TIA     PSHX:  has a past surgical history that includes

## 2024-04-12 NOTE — ED PROVIDER NOTES
[facility] C4 PCU  EMERGENCY DEPARTMENT ENCOUNTER        Patient Name: Agustina Fried  MRN: [id number]  Birthdate 1960  Date of evaluation: 4/12/2024  Provider: Yung Cabezas MD  PCP: Viridiana Blanco, APRN - NP  Note Started: 2:50 PM EDT 4/12/24    CHIEF COMPLAINT       Hyperglycemia (EMS arrived for High blood sugar, and feels like she has noodles on her tongue. Pt is alert and oriented.  Glucose is reading HI)      HISTORY OF PRESENT ILLNESS: 1 or more Elements     History from : Patient    Limitations to history : None    Agustina Fried is a 64 y.o. female who presents for evaluation of high blood sugar.  Patient arrived per EMS.  Patient states that she is not felt well for the past several days.  She states that she been taking her medicines.  She is a diabetic insulin-dependent.  She has had nausea, emesis.  No fevers.  No abdominal pain.    Nursing Notes were all reviewed and agreed with or any disagreements were addressed in the HPI.    REVIEW OF SYSTEMS :      Review of Systems    Positives and Pertinent negatives as per HPI.     SURGICAL HISTORY     Past Surgical History:   Procedure Laterality Date    BREAST LUMPECTOMY  2015    Bilateral:breast cancer    CARDIAC CATHETERIZATION  06/08/2020    Dr. Marcano([facility]), DAYANA to Diag 1    COLONOSCOPY N/A 2/1/2021    COLONOSCOPY DIAGNOSTIC performed by Silviano Pro MD at [facility] ASC ENDOSCOPY    COLONOSCOPY N/A 2/27/2023    COLONOSCOPY WITH BIOPSY performed by Silviano Pro MD at [facility] SSU ENDOSCOPY    CT BONE MARROW BIOPSY  2/3/2021    CT BONE MARROW BIOPSY 2/3/2021 Jamila Cooney MD [facility] CT SCAN    HEMICOLECTOMY N/A 3/7/2023    ROBOTIC CONVERTED TO OPEN RIGHT COLECTOMY performed by Jeffrey Quintero MD at [facility] OR    HYSTERECTOMY (CERVIX STATUS UNKNOWN)      Benign:no cervical cancer per pt'    KIDNEY REMOVAL      right    OTHER SURGICAL HISTORY Right     orif right ankle    TEMPORAL ARTERY BIOPSY Right 8/9/2021    RIGHT TEMPORAL ARTERY BIOPSY LIGATION  I did discuss with the admitting hospitalist whether to initiate insulin infusion and this will be left up to the hospitalist.  There is no evidence of urinary tract infection on urinalysis.    Social Determinants : None    Patient's care impacted by chronic condition(s):   Past Medical History:   Diagnosis Date    Abnormal brain MRI 7/20/2017    Partially empty sella and minimal chronic small vessel ischemic disease    Acute bilateral low back pain without sciatica 11/2/2016    SHARRON (acute kidney injury) ([facility]) 7/5/2017    Arthritis     back    Bipolar disorder ([facility]) 10/18/2008    CAD (coronary artery disease)     stent placed 6/8/20    Cancer ([facility]) 2015    bilateral breast:s/p lumpectomy/radiation:under care care of breast specialist:Dr. Boone     Carotid stenosis, bilateral:<50%:per US 7/2016 7/15/2016    Carpal tunnel syndrome 10/18/2008    Cervical cancer screening 2014    Nml per pt'.    Coronary artery disease of native artery of native heart with stable angina pectoris ([facility]) 6/9/2020    DDD (degenerative disc disease), lumbar 7/18/2018    Depression     under care of pschiatrist:Dr. Rojas    Depression/anxiety 7/5/2017    Depression/anxiety     Diabetes mellitus (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) 9/17/2017    Microalbuminuria 7/1/2016    Neuropathy in diabetes ([facility])     Non morbid obesity 7/1/2016    Pancreatitis 5/12/16    [facility] hospitalization 5/12/16-5/16/16:under care of GI:chronic pancreatitis    S/P endoscopy 6/14/2016    [facility]-[place]:per pt' & her family member was nml.    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 myelopathy or radiculopathy 3/10/2017    Transient cerebral ischemia 07/15/2016    TIA:7/10/16    Unspecified cerebral artery occlusion with cerebral

## 2024-04-13 LAB
ANION GAP SERPL CALCULATED.3IONS-SCNC: 13 MMOL/L (ref 3–16)
BUN SERPL-MCNC: 20 MG/DL (ref 7–20)
CALCIUM SERPL-MCNC: 8.6 MG/DL (ref 8.3–10.6)
CHLORIDE SERPL-SCNC: 96 MMOL/L (ref 99–110)
CO2 SERPL-SCNC: 20 MMOL/L (ref 21–32)
CREAT SERPL-MCNC: 1.3 MG/DL (ref 0.6–1.2)
EKG ATRIAL RATE: 57 BPM
EKG DIAGNOSIS: NORMAL
EKG P AXIS: 64 DEGREES
EKG P-R INTERVAL: 144 MS
EKG Q-T INTERVAL: 446 MS
EKG QRS DURATION: 84 MS
EKG QTC CALCULATION (BAZETT): 434 MS
EKG R AXIS: 23 DEGREES
EKG T AXIS: 63 DEGREES
EKG VENTRICULAR RATE: 57 BPM
GFR SERPLBLD CREATININE-BSD FMLA CKD-EPI: 46 ML/MIN/{1.73_M2}
GLUCOSE BLD-MCNC: 228 MG/DL (ref 70–99)
GLUCOSE BLD-MCNC: 288 MG/DL (ref 70–99)
GLUCOSE BLD-MCNC: 298 MG/DL (ref 70–99)
GLUCOSE BLD-MCNC: 315 MG/DL (ref 70–99)
GLUCOSE BLD-MCNC: 438 MG/DL (ref 70–99)
GLUCOSE SERPL-MCNC: 364 MG/DL (ref 70–99)
LACTATE BLDV-SCNC: 1.6 MMOL/L (ref 0.4–2)
LACTATE BLDV-SCNC: 2.5 MMOL/L (ref 0.4–2)
PERFORMED ON: ABNORMAL
PHOSPHATE SERPL-MCNC: 2.5 MG/DL (ref 2.5–4.9)
PHOSPHATE SERPL-MCNC: 2.5 MG/DL (ref 2.5–4.9)
PHOSPHATE SERPL-MCNC: 2.6 MG/DL (ref 2.5–4.9)
PHOSPHATE SERPL-MCNC: 2.7 MG/DL (ref 2.5–4.9)
PHOSPHATE SERPL-MCNC: 3 MG/DL (ref 2.5–4.9)
POTASSIUM SERPL-SCNC: 4.4 MMOL/L (ref 3.5–5.1)
SODIUM SERPL-SCNC: 129 MMOL/L (ref 136–145)

## 2024-04-13 PROCEDURE — 84100 ASSAY OF PHOSPHORUS: CPT

## 2024-04-13 PROCEDURE — 83605 ASSAY OF LACTIC ACID: CPT

## 2024-04-13 PROCEDURE — 2060000000 HC ICU INTERMEDIATE R&B

## 2024-04-13 PROCEDURE — 6360000002 HC RX W HCPCS: Performed by: INTERNAL MEDICINE

## 2024-04-13 PROCEDURE — 36415 COLL VENOUS BLD VENIPUNCTURE: CPT

## 2024-04-13 PROCEDURE — 93010 ELECTROCARDIOGRAM REPORT: CPT | Performed by: INTERNAL MEDICINE

## 2024-04-13 PROCEDURE — 80048 BASIC METABOLIC PNL TOTAL CA: CPT

## 2024-04-13 PROCEDURE — 6370000000 HC RX 637 (ALT 250 FOR IP): Performed by: INTERNAL MEDICINE

## 2024-04-13 RX ADMIN — OXYCODONE AND ACETAMINOPHEN 1 TABLET: 7.5; 325 TABLET ORAL at 19:37

## 2024-04-13 RX ADMIN — ASPIRIN 81 MG 81 MG: 81 TABLET ORAL at 08:44

## 2024-04-13 RX ADMIN — INSULIN LISPRO 2 UNITS: 100 INJECTION, SOLUTION INTRAVENOUS; SUBCUTANEOUS at 13:21

## 2024-04-13 RX ADMIN — ENOXAPARIN SODIUM 30 MG: 100 INJECTION SUBCUTANEOUS at 08:44

## 2024-04-13 RX ADMIN — AMLODIPINE BESYLATE 10 MG: 5 TABLET ORAL at 08:44

## 2024-04-13 RX ADMIN — INSULIN LISPRO 8 UNITS: 100 INJECTION, SOLUTION INTRAVENOUS; SUBCUTANEOUS at 08:44

## 2024-04-13 RX ADMIN — INSULIN LISPRO 4 UNITS: 100 INJECTION, SOLUTION INTRAVENOUS; SUBCUTANEOUS at 20:37

## 2024-04-13 RX ADMIN — TRAZODONE HYDROCHLORIDE 300 MG: 50 TABLET ORAL at 19:37

## 2024-04-13 RX ADMIN — ATORVASTATIN CALCIUM 40 MG: 40 TABLET, FILM COATED ORAL at 19:37

## 2024-04-13 RX ADMIN — PANTOPRAZOLE SODIUM 40 MG: 40 TABLET, DELAYED RELEASE ORAL at 08:44

## 2024-04-13 RX ADMIN — INSULIN LISPRO 4 UNITS: 100 INJECTION, SOLUTION INTRAVENOUS; SUBCUTANEOUS at 17:49

## 2024-04-13 RX ADMIN — ISOSORBIDE MONONITRATE 30 MG: 30 TABLET, EXTENDED RELEASE ORAL at 08:44

## 2024-04-13 RX ADMIN — INSULIN GLARGINE 15 UNITS: 100 INJECTION, SOLUTION SUBCUTANEOUS at 19:38

## 2024-04-13 ASSESSMENT — PAIN - FUNCTIONAL ASSESSMENT: PAIN_FUNCTIONAL_ASSESSMENT: ACTIVITIES ARE NOT PREVENTED

## 2024-04-13 ASSESSMENT — PAIN DESCRIPTION - DESCRIPTORS: DESCRIPTORS: ACHING

## 2024-04-13 ASSESSMENT — PAIN DESCRIPTION - FREQUENCY: FREQUENCY: CONTINUOUS

## 2024-04-13 ASSESSMENT — PAIN DESCRIPTION - ONSET: ONSET: ON-GOING

## 2024-04-13 ASSESSMENT — PAIN SCALES - GENERAL
PAINLEVEL_OUTOF10: 0
PAINLEVEL_OUTOF10: 7
PAINLEVEL_OUTOF10: 0
PAINLEVEL_OUTOF10: 0

## 2024-04-13 ASSESSMENT — PAIN DESCRIPTION - ORIENTATION: ORIENTATION: LOWER

## 2024-04-13 ASSESSMENT — PAIN DESCRIPTION - PAIN TYPE: TYPE: ACUTE PAIN

## 2024-04-13 ASSESSMENT — PAIN DESCRIPTION - LOCATION: LOCATION: BACK

## 2024-04-13 NOTE — ED NOTES
C4 RN notified pt  prior to transfer. Sliding scale insulin not given in ED as pt had already been transferred from ED, medication unavailable in omni cell. C4 RN to notify hospitalist per orders of BS. Pt transferred via stretcher by transport, all belongings sent with pt.

## 2024-04-13 NOTE — PROGRESS NOTES
Hospital Medicine Progress Note      Date of Admission: 4/12/2024  Hospital Day: 2    Chief Admission Complaint:  elevated BS, not feeling well     Subjective:  no c/o like change in MS, N , V , ab pain or diarrhea     Presenting Admission History:       Agustina Fried is a 64 y.o. female with pmh of type II DM, CAD, stage III CKD, who presents with not feeling well last several dose.  Her glucometer was reading high.  She gives history of excessive thirst and urination for last 2 days.  She also states her tongue started flailing and was not able to talk straight.  Per patient, she has been compliant with her insulin regimen at home.     When seen in the emergency room, she appears extremely dry.  She is not a very good historian.  Denies any abdominal pain, chest pain, dizziness or lightheadedness.     Workup in the emergency room revealed very high blood glucose of 1011 mg/DL, negative ketones and normal anion gap.    Assessment/Plan:      Current Principal Problem:  Hyperosmolar hyperglycemic state (HHS) (HCC)    HHS  Type II DM  Presented with blood glucose of 1011 mg/DL  No anion gap, Negative ketones in urine  Received regular insulin 10 units in the emergency room  Has been taking 8 units nightly insulin and states she has been taking 4 units Premeal.  Questionable compliance  Obtain hemoglobin A1c in the morning.  Plan was to initiate on insulin drip and admitted to ICU.  However on repeat blood sugar check in the ED, sugars improving and currently in 400s.    started on Lantus 15 units nightly ( home dose )and medium dose sliding scale.    Admitted  to stepdown.  BS is better controlled   Diabetic diet         Translocational hyponatremia  Due to hyperglycemia  Sodium on presentation 121 mmol/liter  129 now      Lactic acidosis  Likely due to intravascular depletion  Lactic acid 2.1 mmol/liter on presentation, repeat 3.7  Received IVF bolus  Will recheck- normalized      CKD stage III  Creatinine on

## 2024-04-14 ENCOUNTER — APPOINTMENT (OUTPATIENT)
Dept: ULTRASOUND IMAGING | Age: 64
End: 2024-04-14
Payer: MEDICAID

## 2024-04-14 LAB
ANION GAP SERPL CALCULATED.3IONS-SCNC: 10 MMOL/L (ref 3–16)
ANION GAP SERPL CALCULATED.3IONS-SCNC: 10 MMOL/L (ref 3–16)
BUN SERPL-MCNC: 28 MG/DL (ref 7–20)
BUN SERPL-MCNC: 30 MG/DL (ref 7–20)
CALCIUM SERPL-MCNC: 8 MG/DL (ref 8.3–10.6)
CALCIUM SERPL-MCNC: 8.3 MG/DL (ref 8.3–10.6)
CHLORIDE SERPL-SCNC: 98 MMOL/L (ref 99–110)
CHLORIDE SERPL-SCNC: 98 MMOL/L (ref 99–110)
CO2 SERPL-SCNC: 23 MMOL/L (ref 21–32)
CO2 SERPL-SCNC: 24 MMOL/L (ref 21–32)
CREAT SERPL-MCNC: 2.8 MG/DL (ref 0.6–1.2)
CREAT SERPL-MCNC: 2.9 MG/DL (ref 0.6–1.2)
EST. AVERAGE GLUCOSE BLD GHB EST-MCNC: 312.1 MG/DL
GFR SERPLBLD CREATININE-BSD FMLA CKD-EPI: 17 ML/MIN/{1.73_M2}
GFR SERPLBLD CREATININE-BSD FMLA CKD-EPI: 18 ML/MIN/{1.73_M2}
GLUCOSE BLD-MCNC: 204 MG/DL (ref 70–99)
GLUCOSE BLD-MCNC: 206 MG/DL (ref 70–99)
GLUCOSE BLD-MCNC: 314 MG/DL (ref 70–99)
GLUCOSE BLD-MCNC: 426 MG/DL (ref 70–99)
GLUCOSE SERPL-MCNC: 198 MG/DL (ref 70–99)
GLUCOSE SERPL-MCNC: 215 MG/DL (ref 70–99)
HBA1C MFR BLD: 12.5 %
PERFORMED ON: ABNORMAL
POTASSIUM SERPL-SCNC: 5.1 MMOL/L (ref 3.5–5.1)
POTASSIUM SERPL-SCNC: 5.2 MMOL/L (ref 3.5–5.1)
SODIUM SERPL-SCNC: 131 MMOL/L (ref 136–145)
SODIUM SERPL-SCNC: 132 MMOL/L (ref 136–145)

## 2024-04-14 PROCEDURE — 2580000003 HC RX 258: Performed by: INTERNAL MEDICINE

## 2024-04-14 PROCEDURE — 76770 US EXAM ABDO BACK WALL COMP: CPT

## 2024-04-14 PROCEDURE — 97530 THERAPEUTIC ACTIVITIES: CPT

## 2024-04-14 PROCEDURE — 97116 GAIT TRAINING THERAPY: CPT

## 2024-04-14 PROCEDURE — 6370000000 HC RX 637 (ALT 250 FOR IP): Performed by: INTERNAL MEDICINE

## 2024-04-14 PROCEDURE — 80048 BASIC METABOLIC PNL TOTAL CA: CPT

## 2024-04-14 PROCEDURE — 97535 SELF CARE MNGMENT TRAINING: CPT

## 2024-04-14 PROCEDURE — 6360000002 HC RX W HCPCS: Performed by: INTERNAL MEDICINE

## 2024-04-14 PROCEDURE — 97162 PT EVAL MOD COMPLEX 30 MIN: CPT

## 2024-04-14 PROCEDURE — 2060000000 HC ICU INTERMEDIATE R&B

## 2024-04-14 PROCEDURE — 97165 OT EVAL LOW COMPLEX 30 MIN: CPT

## 2024-04-14 RX ORDER — SODIUM CHLORIDE 9 MG/ML
INJECTION, SOLUTION INTRAVENOUS CONTINUOUS
Status: DISCONTINUED | OUTPATIENT
Start: 2024-04-14 | End: 2024-04-18

## 2024-04-14 RX ORDER — HEPARIN SODIUM 5000 [USP'U]/ML
5000 INJECTION, SOLUTION INTRAVENOUS; SUBCUTANEOUS EVERY 8 HOURS SCHEDULED
Status: DISCONTINUED | OUTPATIENT
Start: 2024-04-15 | End: 2024-04-18

## 2024-04-14 RX ORDER — INSULIN GLARGINE 100 [IU]/ML
8 INJECTION, SOLUTION SUBCUTANEOUS DAILY
Status: DISCONTINUED | OUTPATIENT
Start: 2024-04-14 | End: 2024-04-25 | Stop reason: HOSPADM

## 2024-04-14 RX ORDER — HEPARIN SODIUM 5000 [USP'U]/ML
5000 INJECTION, SOLUTION INTRAVENOUS; SUBCUTANEOUS EVERY 8 HOURS SCHEDULED
Status: DISCONTINUED | OUTPATIENT
Start: 2024-04-14 | End: 2024-04-14

## 2024-04-14 RX ORDER — DEXTROSE MONOHYDRATE 100 MG/ML
INJECTION, SOLUTION INTRAVENOUS CONTINUOUS PRN
Status: DISCONTINUED | OUTPATIENT
Start: 2024-04-14 | End: 2024-04-25 | Stop reason: HOSPADM

## 2024-04-14 RX ADMIN — PALIPERIDONE 9 MG: 3 TABLET, EXTENDED RELEASE ORAL at 09:41

## 2024-04-14 RX ADMIN — INSULIN GLARGINE 8 UNITS: 100 INJECTION, SOLUTION SUBCUTANEOUS at 12:45

## 2024-04-14 RX ADMIN — INSULIN LISPRO 2 UNITS: 100 INJECTION, SOLUTION INTRAVENOUS; SUBCUTANEOUS at 09:41

## 2024-04-14 RX ADMIN — ASPIRIN 81 MG 81 MG: 81 TABLET ORAL at 09:40

## 2024-04-14 RX ADMIN — ENOXAPARIN SODIUM 30 MG: 100 INJECTION SUBCUTANEOUS at 09:42

## 2024-04-14 RX ADMIN — ATORVASTATIN CALCIUM 40 MG: 40 TABLET, FILM COATED ORAL at 20:34

## 2024-04-14 RX ADMIN — OXYCODONE AND ACETAMINOPHEN 1 TABLET: 7.5; 325 TABLET ORAL at 09:40

## 2024-04-14 RX ADMIN — INSULIN LISPRO 8 UNITS: 100 INJECTION, SOLUTION INTRAVENOUS; SUBCUTANEOUS at 12:41

## 2024-04-14 RX ADMIN — INSULIN LISPRO 6 UNITS: 100 INJECTION, SOLUTION INTRAVENOUS; SUBCUTANEOUS at 17:35

## 2024-04-14 RX ADMIN — PANTOPRAZOLE SODIUM 40 MG: 40 TABLET, DELAYED RELEASE ORAL at 09:41

## 2024-04-14 RX ADMIN — TRAZODONE HYDROCHLORIDE 300 MG: 50 TABLET ORAL at 20:34

## 2024-04-14 RX ADMIN — SODIUM CHLORIDE: 9 INJECTION, SOLUTION INTRAVENOUS at 20:33

## 2024-04-14 RX ADMIN — SODIUM CHLORIDE: 9 INJECTION, SOLUTION INTRAVENOUS at 11:29

## 2024-04-14 RX ADMIN — OXYCODONE AND ACETAMINOPHEN 1 TABLET: 7.5; 325 TABLET ORAL at 20:34

## 2024-04-14 RX ADMIN — ISOSORBIDE MONONITRATE 30 MG: 30 TABLET, EXTENDED RELEASE ORAL at 09:40

## 2024-04-14 RX ADMIN — INSULIN GLARGINE 15 UNITS: 100 INJECTION, SOLUTION SUBCUTANEOUS at 20:34

## 2024-04-14 ASSESSMENT — PAIN DESCRIPTION - ORIENTATION
ORIENTATION: LOWER;MID
ORIENTATION: LOWER
ORIENTATION: LOWER;MID

## 2024-04-14 ASSESSMENT — PAIN DESCRIPTION - DESCRIPTORS
DESCRIPTORS: SHARP;SHOOTING
DESCRIPTORS: ACHING;STABBING
DESCRIPTORS: ACHING

## 2024-04-14 ASSESSMENT — PAIN SCALES - GENERAL
PAINLEVEL_OUTOF10: 3
PAINLEVEL_OUTOF10: 0
PAINLEVEL_OUTOF10: 9
PAINLEVEL_OUTOF10: 3
PAINLEVEL_OUTOF10: 9
PAINLEVEL_OUTOF10: 9
PAINLEVEL_OUTOF10: 4

## 2024-04-14 ASSESSMENT — PAIN DESCRIPTION - ONSET
ONSET: GRADUAL
ONSET: ON-GOING

## 2024-04-14 ASSESSMENT — PAIN DESCRIPTION - PAIN TYPE
TYPE: CHRONIC PAIN
TYPE: CHRONIC PAIN

## 2024-04-14 ASSESSMENT — PAIN DESCRIPTION - LOCATION
LOCATION: BACK

## 2024-04-14 ASSESSMENT — PAIN DESCRIPTION - FREQUENCY: FREQUENCY: INTERMITTENT

## 2024-04-14 NOTE — ACP (ADVANCE CARE PLANNING)
Advance Care Planning     General Advance Care Planning (ACP) Conversation    Date of Conversation: 4/14/2024  Conducted with: Patient with Decision Making Capacity    Healthcare Decision Maker:    Primary Decision Maker: Maurice Fried - Child - [phone]    Secondary Decision Maker: Moi Fried - Brother/Sister - [phone]    Secondary Decision Maker: CorkyMarlyn - Daughter-in-Law - [phone]    Supplemental (Other) Decision Maker: PEMA DIANE - Other - [phone]  Click here to complete Healthcare Decision Makers including selection of the Healthcare Decision Maker Relationship (ie \"Primary\").   Today we documented Decision Maker(s) consistent with Legal Next of Kin hierarchy.    Content/Action Overview:  Has ACP document(s) on file - reflects the patient's care preferences  Reviewed DNR/DNI and patient elects Full Code (Attempt Resuscitation)      Length of Voluntary ACP Conversation in minutes:  <16 minutes (Non-Billable)    Evelin García RN

## 2024-04-14 NOTE — PROGRESS NOTES
PIV to RFA infiltrated with NS running, pt does not want another PIV at this time, provider made aware, call light in reach

## 2024-04-14 NOTE — CARE COORDINATION
Case Management Assessment  Initial Evaluation    Date/Time of Evaluation: 4/14/2024 11:13 AM  Assessment Completed by: Evelin García RN    If patient is discharged prior to next notation, then this note serves as note for discharge by case management.    Patient Name: Agustina Fried                   YOB: 1960  Diagnosis: Hyperglycemia [R73.9]  Hyperosmolar hyperglycemic state (HHS) (HCC) [E11.00]  Nausea and vomiting, unspecified vomiting type [R11.2]                   Date / Time: 4/12/2024  1:01 PM    Patient Admission Status: Inpatient   Readmission Risk (Low < 19, Mod (19-27), High > 27): Readmission Risk Score: 29    Current PCP: Viridiana Blanco APRN - NP  PCP verified by CM? Yes    Chart Reviewed: Yes      History Provided by: Patient  Patient Orientation: Alert and Oriented, Person, Situation, Place, Self    Patient Cognition: Alert    Hospitalization in the last 30 days (Readmission):  No    If yes, Readmission Assessment in CM Navigator will be completed.    Advance Directives:      Code Status: Full Code   Patient's Primary Decision Maker is: Legal Next of Kin    Primary Decision Maker: Maurice Fried - Child - [phone]    Secondary Decision Maker: Moi Fried - Brother/Sister - [phone]    Secondary Decision Maker: Marlyn Fried - Daughter-in-Law - [phone]    Supplemental (Other) Decision Maker: BENEDICTOPEMA - Other - [phone]    Discharge Planning:    Patient lives with: Alone Type of Home: Apartment (alone, [address])  Primary Care Giver: Self  Patient Support Systems include: Children, [employer] [employer], Friends/Neighbors   Current Financial resources: Medicaid  Current community resources: None  Current services prior to admission: None            Current DME:              Type of Home Care services:  None    ADLS  Prior functional level: Independent in ADLs/IADLs (with cane prn)  Current functional level: Independent in ADLs/IADLs    PT AM-PAC: 17 /24  OT AM-PAC:

## 2024-04-14 NOTE — H&P
Nephrology note:  Received a message from the patient nurse about this consultation  Chart reviewed  The reason for the consultation SHARRON on CKD with history of nephrectomy    She is 64 years old admitted to the hospital for hyperosmolar hyperglycemia.    The patient's presenting glucose level was 438  On initial presentation, the patient's creatinine was 1.3    The patient's serum creatinine is now up to 2.9    The patient presenting, the patient blood pressure was 198/72 and her blood pressure is now down to 112/51  The patient neuropathy was not documented    Reviewed her medications, amlodipine is on hold.    Rapid drop in blood pressure is probably the main reason for her worsening kidney function.    Will get a bladder scan and will need strict intake and output.

## 2024-04-14 NOTE — PLAN OF CARE
Problem: Pain  Goal: Verbalizes/displays adequate comfort level or baseline comfort level  Outcome: Progressing  Note: Pt with complaints of chronic back pain this shift, medicated with prn percocet with effectiveness.       Problem: Safety - Adult  Goal: Free from fall injury  Outcome: Progressing  Note: Pt is free from falls thus far this shift.  Bed in lowest position and locked, side rails up x2, non skid footwear in place, avasys in place, bed alarm activated on bed.  Call light within reach.       Problem: Cardiovascular - Adult  Goal: Absence of cardiac dysrhythmias or at baseline  Outcome: Progressing  Note: NSR on tele     Problem: Musculoskeletal - Adult  Goal: Return mobility to safest level of function  Outcome: Progressing  Note: Pt worked with therapy this shift, up to chair and bathroom x1 with walker.       Problem: Metabolic/Fluid and Electrolytes - Adult  Goal: Electrolytes maintained within normal limits  Outcome: Not Progressing  Note: Creatinine rising 2.9, nephrology consulted today.  IVF's infusing at 100mL/hr.  Renal ultrasound ordered and completed this shift.    Goal: Glucose maintained within prescribed range  Outcome: Progressing  Note: Blood sugars 200-400's this shift.  Pt on medium SSI, lantus 8 units added daily.  Pt also has lantus 15 units nightly.       Problem: Metabolic/Fluid and Electrolytes - Adult  Goal: Glucose maintained within prescribed range  Outcome: Progressing  Note: Blood sugars 200-400's this shift.  Pt on medium SSI, lantus 8 units added daily.  Pt also has lantus 15 units nightly.

## 2024-04-14 NOTE — PROGRESS NOTES
Hospital Medicine Progress Note      Date of Admission: 4/12/2024  Hospital Day: 3    Chief Admission Complaint:  elevated BS, not feeling well     Subjective:  no c/o like change in MS, N , V , ab pain  Had one loose stool     Presenting Admission History:       Agustina Fried is a 64 y.o. female with pmh of type II DM, CAD, stage III CKD, who presents with not feeling well last several dose.  Her glucometer was reading high.  She gives history of excessive thirst and urination for last 2 days.  She also states her tongue started flailing and was not able to talk straight.  Per patient, she has been compliant with her insulin regimen at home.     When seen in the emergency room, she appears extremely dry.  She is not a very good historian.  Denies any abdominal pain, chest pain, dizziness or lightheadedness.     Workup in the emergency room revealed very high blood glucose of 1011 mg/DL, negative ketones and normal anion gap.    Assessment/Plan:      Current Principal Problem:  Hyperosmolar hyperglycemic state (HHS) (HCC)    HHS  Type II DM  Presented with blood glucose of 1011 mg/DL  No anion gap, Negative ketones in urine  Received regular insulin 10 units in the emergency room  Has been taking 8 units nightly insulin and states she has been taking 4 units Premeal.  Questionable compliance  Obtain hemoglobin A1c in the morning.  Plan was to initiate on insulin drip and admitted to ICU.  However on repeat blood sugar check in the ED, sugars improving and currently in 400s.    started on Lantus 15 units nightly ( home dose )and medium dose sliding scale.    Admitted  to stepdown.  BS is elevated   8 units lantus am , med SSI  Diabetic diet         Translocational hyponatremia  Due to hyperglycemia  Sodium on presentation 121 mmol/liter  129 now      Lactic acidosis  Likely due to intravascular depletion  Lactic acid 2.1 mmol/liter on presentation, repeat 3.7  Received IVF bolus  Will recheck- normalized      Chip

## 2024-04-14 NOTE — PROGRESS NOTES
Physical Therapy  [address]  Physical Therapy Initial Assessment & Treatment    Name: Agustina Fried  : 1960  MRN: [id number]  Date of Service: 2024    Discharge Recommendations:  Subacute/Skilled Nursing Facility   PT Equipment Recommendations  Equipment Needed: No  Other: defer    Therapy discharge recommendations are subject to collaboration from the patient’s interdisciplinary healthcare team, including MD and case management recommendations.    Barriers to Home Discharge:   [x] Steps to access home entry or bed/bath:   [x] Unable to transfer, ambulate, or propel wheelchair household distances without assist   [x] Limited available assist at home upon discharge    [] Patient or family requests d/c to post-acute facility    [x] Poor cognition/safety awareness for d/c to home alone    [] Unable to maintain ordered weight bearing status    [] Patient with salient signs of long-standing immobility   [] Decreased independence with ADLs   [x] Increased risk for falls   [] Other:    If pt is unable to be seen after this session, please let this note serve as discharge summary.  Please see case management note for discharge disposition.  Thank you.        Patient Diagnosis(es): The primary encounter diagnosis was Hyperglycemia. A diagnosis of Nausea and vomiting, unspecified vomiting type was also pertinent to this visit.  Past Medical History:  has a past medical history of Abnormal brain MRI, Acute bilateral low back pain without sciatica, SHARRON (acute kidney injury) ([facility]), Arthritis, Bipolar disorder ([facility]), CAD (coronary artery disease), Cancer ([facility]), Carotid stenosis, bilateral:<50%:per [place] 2016, Carpal tunnel syndrome, Cervical cancer screening, Coronary artery disease of native artery of native heart with stable angina pectoris (HCC), DDD (degenerative disc disease), lumbar, Depression, Depression/anxiety, Depression/anxiety, Diabetes mellitus (HCC), Gout, History of mammogram,  History of therapeutic radiation, Hyperlipidemia, Hypertension, 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  Past Surgical History:  has a past surgical history that includes Kidney removal; Hysterectomy; Breast lumpectomy (2015); Tubal ligation; other surgical history (Right); Cardiac catheterization (06/08/2020); Upper gastrointestinal endoscopy (N/A, 1/29/2021); Colonoscopy (N/A, 2/1/2021); CT BIOPSY BONE MARROW (2/3/2021); Temporal Artery Biopsy (Right, 8/9/2021); Upper gastrointestinal endoscopy (N/A, 12/15/2022); Colonoscopy (N/A, 2/27/2023); and hemicolectomy (N/A, 3/7/2023).    Assessment   Body Structures, Functions, Activity Limitations Requiring Skilled Therapeutic Intervention: Decreased functional mobility ;Decreased safe awareness;Decreased cognition;Decreased strength;Decreased balance;Increased pain;Decreased posture;Decreased coordination  Assessment: Pt is awake and agreeable to therapy session. Pt is limited by global weakness, balance deficits, and decreased cognition. She requires minimal assistance with overall mobility, including transfers and short distance ambulation (15') with and without RW. Pt demos decreased L step length with fatigue; attempted MMT but limited by pt cognition, LLE grossly 4-/5 but recommend follow up test w/ improved cognition. RN notified. Recommend SNF upon discharge to progress towards prior level of function and address remaining balance and mobility deficits.  Therapy Prognosis: Fair  Decision Making: Medium Complexity  Requires PT Follow-Up: Yes  Activity Tolerance  Activity Tolerance: Patient limited by

## 2024-04-14 NOTE — PROGRESS NOTES
redirect  Memory: Decreased recall of recent events  Safety Judgement: Decreased awareness of need for assistance;Decreased awareness of need for safety  Problem Solving: Decreased awareness of errors;Assistance required to implement solutions;Assistance required to identify errors made  Insights: Decreased awareness of deficits  Initiation: Requires cues for some  Sequencing: Requires cues for some  Orientation  Overall Orientation Status: Within Functional Limits  Orientation Level: Oriented X4 (+ time to answer orientation questions)                  Education Given To: Patient  Education Provided: Role of Therapy;Transfer Training;Equipment;Plan of Care;Orientation;Precautions  Education Method: Demonstration;Verbal  Barriers to Learning: None  Education Outcome: Verbalized understanding;Continued education needed        AM-PAC - ADL  AM-PAC Daily Activity - Inpatient   How much help is needed for putting on and taking off regular lower body clothing?: A Lot  How much help is needed for bathing (which includes washing, rinsing, drying)?: A Lot  How much help is needed for toileting (which includes using toilet, bedpan, or urinal)?: A Lot  How much help is needed for putting on and taking off regular upper body clothing?: A Lot  How much help is needed for taking care of personal grooming?: A Little  How much help for eating meals?: A Little  AM-[facility] Inpatient Daily Activity Raw Score: 14  AM-PAC Inpatient ADL T-Scale Score : 33.39  ADL Inpatient CMS 0-100% Score: 59.67  ADL Inpatient CMS G-Code Modifier : CK    Goals  Short Term Goals  Time Frame for Short Term Goals: 4/21, unless otherwise noted  Short Term Goal 1: Pt will complete functional transfer with LRAD and SBA  Short Term Goal 2: Pt will complete toileting with SBA  Short Term Goal 3: Pt will complete standing grooming tasks with SBA  Short Term Goal 4: Pt will complete LB dressing with SBA       Therapy Time   Individual Concurrent Group Co-treatment    Time In 0740         Time Out 0813         Minutes 33         Timed Code Treatment Minutes: 23 Minutes (10 minute eval)       Margo Arambula, OT

## 2024-04-15 LAB
ANION GAP SERPL CALCULATED.3IONS-SCNC: 11 MMOL/L (ref 3–16)
ANION GAP SERPL CALCULATED.3IONS-SCNC: 12 MMOL/L (ref 3–16)
BUN SERPL-MCNC: 29 MG/DL (ref 7–20)
BUN SERPL-MCNC: 29 MG/DL (ref 7–20)
CALCIUM SERPL-MCNC: 7.7 MG/DL (ref 8.3–10.6)
CALCIUM SERPL-MCNC: 7.8 MG/DL (ref 8.3–10.6)
CHLORIDE SERPL-SCNC: 106 MMOL/L (ref 99–110)
CHLORIDE SERPL-SCNC: 107 MMOL/L (ref 99–110)
CO2 SERPL-SCNC: 17 MMOL/L (ref 21–32)
CO2 SERPL-SCNC: 19 MMOL/L (ref 21–32)
CREAT SERPL-MCNC: 2.7 MG/DL (ref 0.6–1.2)
CREAT SERPL-MCNC: 2.8 MG/DL (ref 0.6–1.2)
GFR SERPLBLD CREATININE-BSD FMLA CKD-EPI: 18 ML/MIN/{1.73_M2}
GFR SERPLBLD CREATININE-BSD FMLA CKD-EPI: 19 ML/MIN/{1.73_M2}
GLUCOSE BLD-MCNC: 152 MG/DL (ref 70–99)
GLUCOSE BLD-MCNC: 213 MG/DL (ref 70–99)
GLUCOSE BLD-MCNC: 233 MG/DL (ref 70–99)
GLUCOSE BLD-MCNC: 60 MG/DL (ref 70–99)
GLUCOSE BLD-MCNC: 69 MG/DL (ref 70–99)
GLUCOSE BLD-MCNC: 79 MG/DL (ref 70–99)
GLUCOSE SERPL-MCNC: 187 MG/DL (ref 70–99)
GLUCOSE SERPL-MCNC: 210 MG/DL (ref 70–99)
PERFORMED ON: ABNORMAL
PERFORMED ON: NORMAL
POTASSIUM SERPL-SCNC: 5 MMOL/L (ref 3.5–5.1)
POTASSIUM SERPL-SCNC: 5.7 MMOL/L (ref 3.5–5.1)
SODIUM SERPL-SCNC: 135 MMOL/L (ref 136–145)
SODIUM SERPL-SCNC: 137 MMOL/L (ref 136–145)

## 2024-04-15 PROCEDURE — 2580000003 HC RX 258: Performed by: INTERNAL MEDICINE

## 2024-04-15 PROCEDURE — 6360000002 HC RX W HCPCS: Performed by: INTERNAL MEDICINE

## 2024-04-15 PROCEDURE — 36415 COLL VENOUS BLD VENIPUNCTURE: CPT

## 2024-04-15 PROCEDURE — 6370000000 HC RX 637 (ALT 250 FOR IP): Performed by: INTERNAL MEDICINE

## 2024-04-15 PROCEDURE — 97530 THERAPEUTIC ACTIVITIES: CPT

## 2024-04-15 PROCEDURE — 97535 SELF CARE MNGMENT TRAINING: CPT

## 2024-04-15 PROCEDURE — 2060000000 HC ICU INTERMEDIATE R&B

## 2024-04-15 PROCEDURE — 80048 BASIC METABOLIC PNL TOTAL CA: CPT

## 2024-04-15 RX ORDER — CLONAZEPAM 0.5 MG/1
0.5 TABLET ORAL DAILY PRN
Status: DISCONTINUED | OUTPATIENT
Start: 2024-04-15 | End: 2024-04-25 | Stop reason: HOSPADM

## 2024-04-15 RX ADMIN — SODIUM CHLORIDE: 9 INJECTION, SOLUTION INTRAVENOUS at 06:40

## 2024-04-15 RX ADMIN — HEPARIN SODIUM 5000 UNITS: 5000 INJECTION INTRAVENOUS; SUBCUTANEOUS at 15:52

## 2024-04-15 RX ADMIN — OXYCODONE AND ACETAMINOPHEN 1 TABLET: 7.5; 325 TABLET ORAL at 12:11

## 2024-04-15 RX ADMIN — ASPIRIN 81 MG 81 MG: 81 TABLET ORAL at 09:22

## 2024-04-15 RX ADMIN — ISOSORBIDE MONONITRATE 30 MG: 30 TABLET, EXTENDED RELEASE ORAL at 09:22

## 2024-04-15 RX ADMIN — INSULIN GLARGINE 8 UNITS: 100 INJECTION, SOLUTION SUBCUTANEOUS at 12:10

## 2024-04-15 RX ADMIN — HEPARIN SODIUM 5000 UNITS: 5000 INJECTION INTRAVENOUS; SUBCUTANEOUS at 05:31

## 2024-04-15 RX ADMIN — TRAZODONE HYDROCHLORIDE 300 MG: 50 TABLET ORAL at 21:27

## 2024-04-15 RX ADMIN — HEPARIN SODIUM 5000 UNITS: 5000 INJECTION INTRAVENOUS; SUBCUTANEOUS at 21:30

## 2024-04-15 RX ADMIN — Medication 16 G: at 07:39

## 2024-04-15 RX ADMIN — PALIPERIDONE 9 MG: 3 TABLET, EXTENDED RELEASE ORAL at 09:22

## 2024-04-15 RX ADMIN — CLONAZEPAM 0.5 MG: 0.5 TABLET ORAL at 16:04

## 2024-04-15 RX ADMIN — ATORVASTATIN CALCIUM 40 MG: 40 TABLET, FILM COATED ORAL at 21:27

## 2024-04-15 RX ADMIN — INSULIN LISPRO 2 UNITS: 100 INJECTION, SOLUTION INTRAVENOUS; SUBCUTANEOUS at 12:10

## 2024-04-15 RX ADMIN — SODIUM CHLORIDE: 9 INJECTION, SOLUTION INTRAVENOUS at 16:02

## 2024-04-15 RX ADMIN — PANTOPRAZOLE SODIUM 40 MG: 40 TABLET, DELAYED RELEASE ORAL at 09:22

## 2024-04-15 ASSESSMENT — PAIN SCALES - GENERAL
PAINLEVEL_OUTOF10: 1
PAINLEVEL_OUTOF10: 0
PAINLEVEL_OUTOF10: 2
PAINLEVEL_OUTOF10: 5
PAINLEVEL_OUTOF10: 10
PAINLEVEL_OUTOF10: 2

## 2024-04-15 ASSESSMENT — PAIN DESCRIPTION - LOCATION
LOCATION: BACK
LOCATION: BACK

## 2024-04-15 ASSESSMENT — PAIN DESCRIPTION - ORIENTATION: ORIENTATION: LOWER

## 2024-04-15 NOTE — PROGRESS NOTES
Patient upset upon report at shift change. Patient states \"I had more money than this, I gave it to that girl and now its gone\" Calmed pt with conversation, patient cont to state she had more than the $33 that was counted by myself and day RN. Upon giving patient her purse out of the closet the total amount of money counted was $160.00. Patient much calmer, agreeing to let security lock money up.

## 2024-04-15 NOTE — CONSULTS
Ph: [phone], Fax: [phone]           [facility]               Reason for admission:                 Uncontrolled diabetes with hyperosmolar state    Brief Summary :     Agustina Fried is being seen by nephrology for SHARRON on CKD.      Interval History and plan:      Alert and oriented  Made 1.3 L of urine yesterday  Slowly  Bicarb is low, anion gap 12  Creatinine slightly better however sample was hemolyzed causing high potassium  Will repeat labs stat and ordered management accordingly                       Assessment :        Acute Kidney Injury on CKD stage IIIa/  Creatinine 2.7 at the time of consult-down from peak of 2.9  Appears to be due to rapid lowering of blood pressure  Baseline creatinine 1-1.3  History of chronic diarrhea due to colectomy/recurrent SHARRON in the past  Has reduced renal mass-nephrectomy due to Wilms tumor in the past also CKD contributed by diabetes  Renal imaging-surgical absence of right kidney  Echo 9/21-no abnormality mentioned    Hypertension   BP: (144)/(70)  Pulse:  [92]   BP goal inpatient 130-140 systolic inpatient  Blood pressure as high as 200 systolic on presentation and went down to 112/51           [facility] would like to thank Ankur Douglas MD   for opportunity to serve this patient      Please call with questions at-   24 Hrs Answering service [phone] or  7 am- 5 pm via Perfect serve or cell phone  Dr.Sudhir Temo MD       HPI :     Agustina Fried is a 64 y.o. female presented to   the hospital on 4/12/2024 with known diabetes CAD CKD presented with feeling unwell for several days.  Also very high reading on the glucometer.  Also treated with increased thirst and urination for couple of days.  She was brought to the emergency room where she was found to have very high glucose, was treated for hyperosmolar state and now the glucose is getting better but the creatinine is going up because of which we are

## 2024-04-15 NOTE — PROGRESS NOTES
Pt refuses to have telemetry reapplied, states it \"aggravates me and I'm not here for my heart anyway\"

## 2024-04-15 NOTE — PLAN OF CARE
Problem: Discharge Planning  Goal: Discharge to home or other facility with appropriate resources  Outcome: Progressing  Flowsheets (Taken 4/14/2024 2204)  Discharge to home or other facility with appropriate resources:   Identify barriers to discharge with patient and caregiver   Identify discharge learning needs (meds, wound care, etc)   Refer to discharge planning if patient needs post-hospital services based on physician order or complex needs related to functional status, cognitive ability or social support system   Arrange for needed discharge resources and transportation as appropriate     Problem: Pain  Goal: Verbalizes/displays adequate comfort level or baseline comfort level  4/14/2024 2204 by Harjinder Fletcher, RN  Outcome: Progressing  Flowsheets (Taken 4/14/2024 2204)  Verbalizes/displays adequate comfort level or baseline comfort level:   Encourage patient to monitor pain and request assistance   Administer analgesics based on type and severity of pain and evaluate response   Consider cultural and social influences on pain and pain management   Assess pain using appropriate pain scale   Implement non-pharmacological measures as appropriate and evaluate response   Notify Licensed Independent Practitioner if interventions unsuccessful or patient reports new pain  4/14/2024 1849 by Cecily Thomas, RN  Outcome: Progressing  Note: Pt with complaints of chronic back pain this shift, medicated with prn percocet with effectiveness.       Problem: Safety - Adult  Goal: Free from fall injury  4/14/2024 2204 by Harjinder Fletcher RN  Outcome: Progressing  Flowsheets (Taken 4/14/2024 2204)  Free From Fall Injury:   Instruct family/caregiver on patient safety   Based on caregiver fall risk screen, instruct family/caregiver to ask for assistance with transferring infant if caregiver noted to have fall risk factors  4/14/2024 1849 by Cecily Thomas, RN  Outcome: Progressing  Note: Pt is free from falls thus far

## 2024-04-15 NOTE — CARE COORDINATION
LOS 3.  Care managed by [facility]  Here w DM, CKD. From home alone. Has SNF recs- Discussed dispo w pt at bedside-refused SNF yesterday and again today. States friend will assist. Plan HHC-prev active w [facility]- they are unable to accept.   Seeking agency that can accept payer and service area.  [facility] tent[facility]-  will follow up w CM tomorrow. Nasrin Delaney RN    Ok to approve

## 2024-04-15 NOTE — PROGRESS NOTES
Security at bedside, money counted with patient and security. Security form completed and money sealed in envelope. Patient cont to be ok with security locking money up until discharge.

## 2024-04-15 NOTE — PLAN OF CARE
Problem: Discharge Planning  Goal: Discharge to home or other facility with appropriate resources  4/15/2024 1033 by Lorin Tellez RN  Outcome: Progressing  4/14/2024 2204 by Harjinder Fletcher RN  Outcome: Progressing  Flowsheets (Taken 4/14/2024 2204)  Discharge to home or other facility with appropriate resources:   Identify barriers to discharge with patient and caregiver   Identify discharge learning needs (meds, wound care, etc)   Refer to discharge planning if patient needs post-hospital services based on physician order or complex needs related to functional status, cognitive ability or social support system   Arrange for needed discharge resources and transportation as appropriate     Problem: Pain  Goal: Verbalizes/displays adequate comfort level or baseline comfort level  4/15/2024 1033 by Lorin Tellez RN  Outcome: Progressing  4/14/2024 2204 by Harjinder Fletcher RN  Outcome: Progressing  Flowsheets (Taken 4/14/2024 2204)  Verbalizes/displays adequate comfort level or baseline comfort level:   Encourage patient to monitor pain and request assistance   Administer analgesics based on type and severity of pain and evaluate response   Consider cultural and social influences on pain and pain management   Assess pain using appropriate pain scale   Implement non-pharmacological measures as appropriate and evaluate response   Notify Licensed Independent Practitioner if interventions unsuccessful or patient reports new pain     Problem: Safety - Adult  Goal: Free from fall injury  4/15/2024 1033 by Lorin Tellez RN  Outcome: Progressing  4/14/2024 2204 by Harjinder Fletcher RN  Outcome: Progressing  Flowsheets (Taken 4/14/2024 2204)  Free From Fall Injury:   Instruct family/caregiver on patient safety   Based on caregiver fall risk screen, instruct family/caregiver to ask for assistance with transferring infant if caregiver noted to have fall risk factors     Problem: ABCDS Injury Assessment  Goal:  rhythm   Administer antiarrhythmia medication and electrolyte replacement as ordered   Assess for signs of decreased cardiac output     Problem: Musculoskeletal - Adult  Goal: Return mobility to safest level of function  4/15/2024 1033 by Lorin Tellez RN  Outcome: Progressing  4/14/2024 2204 by Harjinder Fletcher RN  Outcome: Progressing  Flowsheets (Taken 4/14/2024 2204)  Return Mobility to Safest Level of Function:   Assess patient stability and activity tolerance for standing, transferring and ambulating with or without assistive devices   Assist with transfers and ambulation using safe patient handling equipment as needed   Ensure adequate protection for wounds/incisions during mobilization   Obtain physical therapy/occupational therapy consults as needed   Instruct patient/family in ordered activity level     Problem: Metabolic/Fluid and Electrolytes - Adult  Goal: Electrolytes maintained within normal limits  4/15/2024 1033 by Lorin Tellez RN  Outcome: Progressing  4/14/2024 2204 by Harjinder Fletcher RN  Outcome: Progressing  Flowsheets (Taken 4/14/2024 2204)  Electrolytes maintained within normal limits:   Monitor labs and assess patient for signs and symptoms of electrolyte imbalances   Monitor response to electrolyte replacements, including repeat lab results as appropriate   Administer electrolyte replacement as ordered  Goal: Glucose maintained within prescribed range  4/15/2024 1033 by Lorin Tellez RN  Outcome: Progressing  4/14/2024 2204 by Harjinder Fletcher RN  Outcome: Progressing  Flowsheets (Taken 4/14/2024 2204)  Glucose maintained within prescribed range:   Monitor blood glucose as ordered   Administer ordered medications to maintain glucose within target range   Instruct patient on self management of diabetes and initiate consult as needed   Assess for signs and symptoms of hyperglycemia and hypoglycemia   Assess barriers to adequate nutritional intake and initiate nutrition

## 2024-04-15 NOTE — PROGRESS NOTES
Hospital Medicine Progress Note      Date of Admission: 4/12/2024  Hospital Day: 4    Chief Admission Complaint:  elevated BS, not feeling well     Subjective:  no c/o like change in MS, N , V , ab pain  BS low this am    Presenting Admission History:       Agustina Fried is a 64 y.o. female with pmh of type II DM, CAD, stage III CKD, who presents with not feeling well last several dose.  Her glucometer was reading high.  She gives history of excessive thirst and urination for last 2 days.  She also states her tongue started flailing and was not able to talk straight.  Per patient, she has been compliant with her insulin regimen at home.     When seen in the emergency room, she appears extremely dry.  She is not a very good historian.  Denies any abdominal pain, chest pain, dizziness or lightheadedness.     Workup in the emergency room revealed very high blood glucose of 1011 mg/DL, negative ketones and normal anion gap.    Assessment/Plan:      Current Principal Problem:  Hyperosmolar hyperglycemic state (HHS) (HCC)    HHS  Type II DM  Presented with blood glucose of 1011 mg/DL  No anion gap, Negative ketones in urine  Received regular insulin 10 units in the emergency room  Has been taking 8 units nightly insulin and states she has been taking 4 units Premeal.  Questionable compliance  Obtain hemoglobin A1c in the morning.  Plan was to initiate on insulin drip and admitted to ICU.  However on repeat blood sugar check in the ED, sugars improving and currently in 400s.    started on Lantus 15 units nightly ( home dose )and medium dose sliding scale.    Admitted  to stepdown.  BS is elevated   8 units lantus am , med SSI  Diabetic diet   BS low this am s/p OJ  200 now - cont with 8 unit daily but hold pm lantus as of SHARRON and low blood sugar         Translocational hyponatremia  Due to hyperglycemia  Sodium on presentation 121 mmol/liter  improved     Lactic acidosis  Likely due to intravascular depletion  Lactic  acid 2.1 mmol/liter on presentation, repeat 3.7  Received IVF bolus  Will recheck- normalized      Chip on CKD stage III 2/2 drop in BP?  Creatinine  bumped to 2.8   IVF , avoid nephrotoxic meds   Norvasc on hold   IVF, I/O  Given nephrectomy Nephrology eval appreciated   Renal US - defer to nephro     History of anxiety/depression on Invega, continue     History of CAD  On aspirin and Lipitor, continue  Last echocardiogram 1/18/2024, EF 50 to 55%    HTN -  mildly elevated  -  Norvasc  on hold     Physical Exam Performed:      General appearance:  No apparent distress  Respiratory:  Normal respiratory effort.   Cardiovascular:  Regular rate and rhythm.  Abdomen:  Soft, non-tender, non-distended.  Musculoskeletal:  No edema  Neurologic:  Non-focal  Psychiatric:  Alert and oriented    BP (!) 155/69   Pulse 72   Temp 98.5 °F (36.9 °C)   Resp 18   Ht 1.575 m (5' 2\")   Wt 56.6 kg (124 lb 12.8 oz)   SpO2 99%   BMI 22.83 kg/m²     Diet: ADULT DIET; Regular; 4 carb choices (60 gm/meal); Low Potassium (Less than 3000 mg/day)  DVT Prophylaxis: []PPx LMWH  [x]SQ Heparin  []IPC/SCDs  []Eliquis  []Xarelto  []Coumadin  []Other -      Code status: Full Code  PT/OT Eval Status:   []NOT yet ordered  []Ordered and Pending   [x]Seen with Recommendations for:  []Home independently  []Home w/ assist  []HHC  [x]SNF  []Acute Rehab  Pt declined SNF    Anticipated Discharge Day/Date:  pending progress      Anticipated Discharge Location: []Home  []HHC  []SNF  []Acute Rehab  []ECF  []LTAC  []Hospice  []Other -      Consults:      IP CONSULT TO DIABETES EDUCATOR  IP CONSULT TO NEPHROLOGY      This patient has a high likelihood of being discharged tomorrow and is appropriate for A1 Discharge Unit in AM pending clinical course overnight: []Yes  []No    ------------------------------------------------------------------------------------------------------------------------------------------------------------------------    MDM      [x] High

## 2024-04-15 NOTE — PROGRESS NOTES
New orders to d/c telemetry, per POONAM Condon as pt refuses to have reapplied. Patient is SR with no edema noted, denies palpitations, shortness of breath, dizziness etc.

## 2024-04-15 NOTE — PROGRESS NOTES
Occupational Therapy  Facility/Department: [facility] C4 PCU  Daily Treatment Note  NAME: Agustina Fried  : 1960  MRN: [id number]    Date of Service: 4/15/2024    Discharge Recommendations:  Subacute/Skilled Nursing Facility  OT Equipment Recommendations  Equipment Needed: No (defer)    Patient Diagnosis(es): The primary encounter diagnosis was Hyperglycemia. A diagnosis of Nausea and vomiting, unspecified vomiting type was also pertinent to this visit.     Assessment    Assessment: Pt w/ fair tolerance to session remaining limited by cognition, fatigue, pain and endurance/ activity tolerance. Pt completed bed mobility SUPV-SBA, CGA progressing to SBA functional mobility RW, SUPV seated grooming ADLs and total A toileting ADLs. Pt required significant cueing for safety, task initiation/ sequencing, and inc time during all functional tasks as pt w/ poor attention span when engaged in conversation and intermittently emotional regarding \"being unable to contact her sister\" requiring reorientation to situation. Pt completed seated grooming ADLs EOB as pt declining mobility to bathroom to complete in stance at sink. Pt then stood to RW and immediately reported dizziness though BP WFL seated and in stance (143/69 + 147/67). Pt eventually agreeable to short mobility to door when PCA arrived to measure BS and pt stood in doorway for x3 additional min w/ SB-CGA. Pt returned to chair and immediately requesting to return supine despite education for pain relief and repositioning for musculoskeletal benefits but pt unreceptive to education. OT placed pillow under R hip for pain mgmt and positioned w/ needs met and within reach. OT rec d/c SNF, cont acute OT.   Activity Tolerance: Patient limited by fatigue;Treatment limited secondary to decreased cognition  Discharge Recommendations: Subacute/Skilled Nursing Facility  Equipment Needed: No (defer)      Plan   Occupational Therapy Plan  Times Per Week: 3-5x/week  Current  Impaired  Standing - Static: Fair;Occasional  Standing - Dynamic: Fair;Occasional  Transfer Training  Transfer Training: Yes  Overall Level of Assistance: Contact-guard assistance;Adaptive equipment;Additional time  Interventions: Verbal cues;Safety awareness training  Sit to Stand: Contact-guard assistance;Stand-by assistance;Adaptive equipment;Additional time (CGA progressing to SBA w/ RW)  Stand to Sit: Stand-by assistance;Adaptive equipment;Additional time (RW, cues for hand placement)  Stand Pivot Transfers: Adaptive equipment;Additional time;Contact-guard assistance (RW, cues for sequencing / RW mgmt)  Bed to Chair: Adaptive equipment;Additional time;Stand-by assistance;Contact-guard assistance (RW, cues for sequencing/ safety / RW mgmt)     ADL  Grooming: Supervision  Grooming Skilled Clinical Factors: hair care seated EOB, pt adamantly declining to complete in stance at sink despite encouragment  Toileting: Dependent/Total  Toileting Skilled Clinical Factors: +purewick, pt declining transfer to toilet or additional toileting needs.  Functional Mobility: Contact guard assistance;Stand by assistance;Increased time to complete;Adaptive equipment  Functional Mobility Skilled Clinical Factors: CGA progressing to SBA w/ RW in room  Skin Care: Soap and water        Safety Devices  Type of Devices: Left in bed;Bed alarm in place;Nurse notified;Heels elevated for pressure relief;All fall risk precautions in place;Patient at risk for falls;Call light within reach     Patient Education  Education Given To: Patient  Education Provided: Role of Therapy;Transfer Training;Equipment;Plan of Care;Orientation;Precautions  Education Method: Demonstration;Verbal  Barriers to Learning: None  Education Outcome: Verbalized understanding;Continued education needed  Disease Specific Education: Pt educated on importance of OOB mobility, prevention of complications of bedrest, and general safety during hospitalization. Pt verbalized

## 2024-04-16 LAB
ANION GAP SERPL CALCULATED.3IONS-SCNC: 10 MMOL/L (ref 3–16)
BASOPHILS # BLD: 0 K/UL (ref 0–0.2)
BASOPHILS NFR BLD: 0.2 %
BUN SERPL-MCNC: 29 MG/DL (ref 7–20)
CALCIUM SERPL-MCNC: 7.6 MG/DL (ref 8.3–10.6)
CHLORIDE SERPL-SCNC: 110 MMOL/L (ref 99–110)
CO2 SERPL-SCNC: 20 MMOL/L (ref 21–32)
CREAT SERPL-MCNC: 3.1 MG/DL (ref 0.6–1.2)
DEPRECATED RDW RBC AUTO: 13.6 % (ref 12.4–15.4)
EKG ATRIAL RATE: 76 BPM
EKG DIAGNOSIS: NORMAL
EKG P AXIS: 39 DEGREES
EKG P-R INTERVAL: 142 MS
EKG Q-T INTERVAL: 400 MS
EKG QRS DURATION: 74 MS
EKG QTC CALCULATION (BAZETT): 450 MS
EKG R AXIS: -14 DEGREES
EKG T AXIS: 63 DEGREES
EKG VENTRICULAR RATE: 76 BPM
EOSINOPHIL # BLD: 0.1 K/UL (ref 0–0.6)
EOSINOPHIL NFR BLD: 0.9 %
GFR SERPLBLD CREATININE-BSD FMLA CKD-EPI: 16 ML/MIN/{1.73_M2}
GLUCOSE BLD-MCNC: 113 MG/DL (ref 70–99)
GLUCOSE BLD-MCNC: 188 MG/DL (ref 70–99)
GLUCOSE BLD-MCNC: 265 MG/DL (ref 70–99)
GLUCOSE BLD-MCNC: 296 MG/DL (ref 70–99)
GLUCOSE SERPL-MCNC: 86 MG/DL (ref 70–99)
HCT VFR BLD AUTO: 25.3 % (ref 36–48)
HGB BLD-MCNC: 7.9 G/DL (ref 12–16)
LYMPHOCYTES # BLD: 2 K/UL (ref 1–5.1)
LYMPHOCYTES NFR BLD: 24.9 %
MCH RBC QN AUTO: 27.3 PG (ref 26–34)
MCHC RBC AUTO-ENTMCNC: 31.3 G/DL (ref 31–36)
MCV RBC AUTO: 87.2 FL (ref 80–100)
MONOCYTES # BLD: 0.6 K/UL (ref 0–1.3)
MONOCYTES NFR BLD: 8.2 %
NEUTROPHILS # BLD: 5.2 K/UL (ref 1.7–7.7)
NEUTROPHILS NFR BLD: 65.8 %
PERFORMED ON: ABNORMAL
PLATELET # BLD AUTO: 202 K/UL (ref 135–450)
PMV BLD AUTO: 8.8 FL (ref 5–10.5)
POTASSIUM SERPL-SCNC: 4.6 MMOL/L (ref 3.5–5.1)
RBC # BLD AUTO: 2.9 M/UL (ref 4–5.2)
SODIUM SERPL-SCNC: 140 MMOL/L (ref 136–145)
WBC # BLD AUTO: 7.9 K/UL (ref 4–11)

## 2024-04-16 PROCEDURE — 85025 COMPLETE CBC W/AUTO DIFF WBC: CPT

## 2024-04-16 PROCEDURE — 93010 ELECTROCARDIOGRAM REPORT: CPT | Performed by: INTERNAL MEDICINE

## 2024-04-16 PROCEDURE — 2580000003 HC RX 258: Performed by: INTERNAL MEDICINE

## 2024-04-16 PROCEDURE — 97116 GAIT TRAINING THERAPY: CPT

## 2024-04-16 PROCEDURE — 2060000000 HC ICU INTERMEDIATE R&B

## 2024-04-16 PROCEDURE — 97110 THERAPEUTIC EXERCISES: CPT

## 2024-04-16 PROCEDURE — 80048 BASIC METABOLIC PNL TOTAL CA: CPT

## 2024-04-16 PROCEDURE — 36415 COLL VENOUS BLD VENIPUNCTURE: CPT

## 2024-04-16 PROCEDURE — 93005 ELECTROCARDIOGRAM TRACING: CPT | Performed by: INTERNAL MEDICINE

## 2024-04-16 PROCEDURE — 97530 THERAPEUTIC ACTIVITIES: CPT

## 2024-04-16 PROCEDURE — 51798 US URINE CAPACITY MEASURE: CPT

## 2024-04-16 PROCEDURE — 6370000000 HC RX 637 (ALT 250 FOR IP): Performed by: INTERNAL MEDICINE

## 2024-04-16 PROCEDURE — 6360000002 HC RX W HCPCS: Performed by: INTERNAL MEDICINE

## 2024-04-16 RX ORDER — PALIPERIDONE 3 MG/1
3 TABLET, EXTENDED RELEASE ORAL EVERY MORNING
Status: DISCONTINUED | OUTPATIENT
Start: 2024-04-17 | End: 2024-04-25 | Stop reason: HOSPADM

## 2024-04-16 RX ORDER — CLONAZEPAM 0.5 MG/1
0.5 TABLET ORAL DAILY PRN
Status: ON HOLD | COMMUNITY
End: 2024-04-25

## 2024-04-16 RX ADMIN — HEPARIN SODIUM 5000 UNITS: 5000 INJECTION INTRAVENOUS; SUBCUTANEOUS at 22:22

## 2024-04-16 RX ADMIN — OXYCODONE AND ACETAMINOPHEN 1 TABLET: 7.5; 325 TABLET ORAL at 11:28

## 2024-04-16 RX ADMIN — SODIUM CHLORIDE: 9 INJECTION, SOLUTION INTRAVENOUS at 12:28

## 2024-04-16 RX ADMIN — CLONAZEPAM 0.5 MG: 0.5 TABLET ORAL at 17:41

## 2024-04-16 RX ADMIN — ISOSORBIDE MONONITRATE 30 MG: 30 TABLET, EXTENDED RELEASE ORAL at 09:46

## 2024-04-16 RX ADMIN — SODIUM CHLORIDE: 9 INJECTION, SOLUTION INTRAVENOUS at 02:43

## 2024-04-16 RX ADMIN — TRAZODONE HYDROCHLORIDE 300 MG: 50 TABLET ORAL at 22:21

## 2024-04-16 RX ADMIN — INSULIN GLARGINE 8 UNITS: 100 INJECTION, SOLUTION SUBCUTANEOUS at 09:46

## 2024-04-16 RX ADMIN — ASPIRIN 81 MG 81 MG: 81 TABLET ORAL at 09:46

## 2024-04-16 RX ADMIN — SODIUM CHLORIDE: 9 INJECTION, SOLUTION INTRAVENOUS at 22:28

## 2024-04-16 RX ADMIN — PALIPERIDONE 9 MG: 3 TABLET, EXTENDED RELEASE ORAL at 09:46

## 2024-04-16 RX ADMIN — PANTOPRAZOLE SODIUM 40 MG: 40 TABLET, DELAYED RELEASE ORAL at 09:46

## 2024-04-16 RX ADMIN — HEPARIN SODIUM 5000 UNITS: 5000 INJECTION INTRAVENOUS; SUBCUTANEOUS at 14:14

## 2024-04-16 RX ADMIN — HEPARIN SODIUM 5000 UNITS: 5000 INJECTION INTRAVENOUS; SUBCUTANEOUS at 06:09

## 2024-04-16 RX ADMIN — ATORVASTATIN CALCIUM 40 MG: 40 TABLET, FILM COATED ORAL at 22:21

## 2024-04-16 RX ADMIN — INSULIN LISPRO 4 UNITS: 100 INJECTION, SOLUTION INTRAVENOUS; SUBCUTANEOUS at 17:41

## 2024-04-16 ASSESSMENT — PAIN DESCRIPTION - LOCATION: LOCATION: BACK

## 2024-04-16 ASSESSMENT — PAIN SCALES - GENERAL: PAINLEVEL_OUTOF10: 8

## 2024-04-16 ASSESSMENT — PAIN DESCRIPTION - ORIENTATION: ORIENTATION: LOWER

## 2024-04-16 ASSESSMENT — PAIN DESCRIPTION - DESCRIPTORS: DESCRIPTORS: ACHING

## 2024-04-16 NOTE — PLAN OF CARE
Problem: Discharge Planning  Goal: Discharge to home or other facility with appropriate resources  Outcome: Progressing     Problem: Pain  Goal: Verbalizes/displays adequate comfort level or baseline comfort level  Outcome: Progressing     Problem: Safety - Adult  Goal: Free from fall injury  Outcome: Progressing     Problem: ABCDS Injury Assessment  Goal: Absence of physical injury  Outcome: Progressing     Problem: Skin/Tissue Integrity  Goal: Absence of new skin breakdown  Outcome: Progressing     Problem: Chronic Conditions and Co-morbidities  Goal: Patient's chronic conditions and co-morbidity symptoms are monitored and maintained or improved  Outcome: Progressing     Problem: Cardiovascular - Adult  Goal: Absence of cardiac dysrhythmias or at baseline  Outcome: Progressing  Goal: Maintains optimal cardiac output and hemodynamic stability  Outcome: Progressing     Problem: Musculoskeletal - Adult  Goal: Return mobility to safest level of function  Outcome: Progressing     Problem: Metabolic/Fluid and Electrolytes - Adult  Goal: Electrolytes maintained within normal limits  Outcome: Progressing

## 2024-04-16 NOTE — CARE COORDINATION
Patient has uncontrolled diabetes with hyperosmolar state. BP has been very high per nephrology note. SNF recommendations noted and patient is currently refusing SNF stating her friend will assist her and she will go home with [facility] through [facility]. Family is concerned about patient going home.Will re-visit SNF with patient to see if she is willing to reconsider. If so will need pre-cert. SNF VS home with [facility].

## 2024-04-16 NOTE — PLAN OF CARE
Problem: Discharge Planning  Goal: Discharge to home or other facility with appropriate resources  Outcome: Progressing     Problem: Pain  Goal: Verbalizes/displays adequate comfort level or baseline comfort level  Outcome: Progressing     Problem: Safety - Adult  Goal: Free from fall injury  Outcome: Progressing     Problem: ABCDS Injury Assessment  Goal: Absence of physical injury  Outcome: Progressing     Problem: Skin/Tissue Integrity  Goal: Absence of new skin breakdown  Description: 1.  Monitor for areas of redness and/or skin breakdown  2.  Assess vascular access sites hourly  3.  Every 4-6 hours minimum:  Change oxygen saturation probe site  4.  Every 4-6 hours:  If on nasal continuous positive airway pressure, respiratory therapy assess nares and determine need for appliance change or resting period.  Outcome: Progressing     Problem: Chronic Conditions and Co-morbidities  Goal: Patient's chronic conditions and co-morbidity symptoms are monitored and maintained or improved  Outcome: Progressing     Problem: Cardiovascular - Adult  Goal: Absence of cardiac dysrhythmias or at baseline  Outcome: Progressing  Goal: Maintains optimal cardiac output and hemodynamic stability  Outcome: Progressing     Problem: Musculoskeletal - Adult  Goal: Return mobility to safest level of function  Outcome: Progressing     Problem: Metabolic/Fluid and Electrolytes - Adult  Goal: Electrolytes maintained within normal limits  Outcome: Progressing  Goal: Glucose maintained within prescribed range  Outcome: Progressing     Problem: Neurosensory - Adult  Goal: Achieves stable or improved neurological status  Outcome: Progressing     Problem: Respiratory - Adult  Goal: Achieves optimal ventilation and oxygenation  Outcome: Progressing     Problem: Skin/Tissue Integrity - Adult  Goal: Incisions, wounds, or drain sites healing without S/S of infection  Outcome: Progressing     Problem: Gastrointestinal - Adult  Goal: Maintains or  returns to baseline bowel function  Outcome: Progressing     Problem: Genitourinary - Adult  Goal: Absence of urinary retention  Outcome: Progressing     Problem: Infection - Adult  Goal: Absence of infection at discharge  Outcome: Progressing     Problem: Hematologic - Adult  Goal: Maintains hematologic stability  Outcome: Progressing

## 2024-04-16 NOTE — PROGRESS NOTES
Physical Therapy  Facility/Department: [facility] C4 PCU  Daily Treatment Note  NAME: Agustina Fried  : 1960  MRN: [id number]    Date of Service: 2024    Discharge Recommendations:  Subacute/Skilled Nursing Facility   PT Equipment Recommendations  Equipment Needed: No  Other: defer    Patient Diagnosis(es): The primary encounter diagnosis was Hyperglycemia. A diagnosis of Nausea and vomiting, unspecified vomiting type was also pertinent to this visit.    Assessment   Assessment: Patient completed transfers with CGA. Pt ambulates with RW and min A x1 with max cues for safety and maintaining YOSSI within RW. BP stable throughout session. Pt endorses fatigue. P.T .will continue to follow throughout LOS.  Recommending DC to SNF given current deficits.  Activity Tolerance: Patient limited by endurance;Patient tolerated treatment well  Equipment Needed: No  Other: defer     Plan    Physical Therapy Plan  General Plan: 3-5 times per week  Current Treatment Recommendations: Strengthening;Balance training;Functional mobility training;Transfer training;Wheelchair mobility training;Stair training;Neuromuscular re-education;Endurance training;Home exercise program;Cognitive reorientation;Pain management;Safety education & training;Therapeutic activities     Restrictions  Restrictions/Precautions  Restrictions/Precautions: Up as Tolerated, Fall Risk  Required Braces or Orthoses?: No  Position Activity Restriction  Other position/activity restrictions: Tele, purewick     Subjective    Subjective  Subjective: pt agreeable to therapy; sister and brother present  Pain: c/o buttocks pain, unrated  Orientation  Overall Orientation Status: Within Functional Limits     Objective   Vitals  Vitals:    24 1135   BP: (!) 150/64   Pulse: 95   Resp: 16   Temp: 97.6 °F (36.4 °C)   SpO2: 97%      Bed Mobility Training  Bed Mobility Training: Yes  Interventions: Safety awareness training;Verbal cues;Visual cues  Supine to Sit: Adaptive

## 2024-04-16 NOTE — PROGRESS NOTES
Occupational Therapy  Facility/Department: [facility] C4 PCU  Daily Treatment Note  NAME: Agustina Fried  : 1960  MRN: [id number]    Date of Service: 2024    Discharge Recommendations:  Subacute/Skilled Nursing Facility  OT Equipment Recommendations  Equipment Needed: No (defer)    Patient Diagnosis(es): The primary encounter diagnosis was Hyperglycemia. A diagnosis of Nausea and vomiting, unspecified vomiting type was also pertinent to this visit.     Assessment    Assessment: Co-tx collaboration this date to safely meet goals and will have better occupational performance outcomes with in a co-treatment than 1:1 session. Pt w/ fair tolerance to session this date remaining limited by endurance, activity tolerance, cognition, impaired attention span and fatigue. Pt family members present t/o session to provide additional encouragement for pt participation in therapy this date. Pt completing bed mobility SBA, functional transfers CGA, functional mobility min A and toileting total A via purewick. Pt required inc cueing / assist for safety, sequencing and RW mgmt during mobility and transfers this date and pt w/ dec attention span noted t/o session. Pt positioned in chair for comfort after mobility and agreeable to complete BUE/LE therex prior to OT exit for inc strengthening in prep for transfers and dynamic ADLs. OT rec d/c SNF, cont acute OT.   Activity Tolerance: Patient limited by endurance;Patient tolerated treatment well  Discharge Recommendations: Subacute/Skilled Nursing Facility  Equipment Needed: No (defer)      Plan   Occupational Therapy Plan  Times Per Week: 3-5x/week  Current Treatment Recommendations: Strengthening;ROM;Neuromuscular re-education;Patient/Caregiver education & training;Self-Care / ADL;Functional mobility training;Safety education & training;Equipment evaluation, education, & procurement;Cognitive reorientation     Restrictions  Restrictions/Precautions  Restrictions/Precautions: Up

## 2024-04-16 NOTE — PROGRESS NOTES
Ph: [phone], Fax: [phone]           [facility]               Reason for admission:                 Uncontrolled diabetes with hyperosmolar state    Brief Summary :     Agustina Fried is being seen by nephrology for SHARRON on CKD.      Interval History and plan:      Blood pressure is very high  Made 1.2 L of urine yesterday  Potassium was high yesterday but was hemolyzed-repeat potassium was much better at 5  Also bicarb better at 19     Plan:  Start back on p.o. amlodipine  Check bladder scan  Continue low potassium diet for now  Hold IV as needed hydralazine-due to risks of further rising creatinine with rapid blood pressure control  Adjusted dose of invega according to degree of renal failure                   Assessment :        Acute Kidney Injury on CKD stage IIIa/  Creatinine 2.7 at the time of consult-down from peak of 2.9  Appears to be due to rapid lowering of blood pressure  Baseline creatinine 1-1.3  History of chronic diarrhea due to colectomy/recurrent SHARRON in the past  Has reduced renal mass-nephrectomy due to Wilms tumor in the past also CKD contributed by diabetes  Renal imaging-surgical absence of right kidney  Echo 9/21-no abnormality mentioned    Hypertension   BP: (144-167)/(67-73)  Pulse:  [75-88]   BP goal inpatient 130-140 systolic inpatient  Blood pressure as high as 200 systolic on presentation and went down to 112/51           [facility] would like to thank Ankur Douglas MD   for opportunity to serve this patient      Please call with questions at-   24 Hrs Answering service [phone] or  7 am- 5 pm via Perfect serve or cell phone  Dr.Sudhir Temo MD       HPI :     Agustina Fried is a 64 y.o. female presented to   the hospital on 4/12/2024 with known diabetes CAD CKD presented with feeling unwell for several days.  Also very high reading on the glucometer.  Also treated with increased thirst and urination for couple of days.  She was brought

## 2024-04-16 NOTE — PROGRESS NOTES
Spoke to pt's family member, Viola, over the phone. Family member insists that patient needs home healthcare. Informed her that this would be passed along to day shift and case management. She states she will be here tomorrow morning.

## 2024-04-16 NOTE — PROGRESS NOTES
Hospital Medicine Progress Note      Date of Admission: 4/12/2024  Hospital Day: 5    Chief Admission Complaint:  elevated BS, not feeling well     Subjective:    Having BF  c/p CP , told was upset in am and started with CP , right sided , still has but was able to have BF with no trouble      Presenting Admission History:       Agustina Fried is a 64 y.o. female with pmh of type II DM, CAD, stage III CKD, who presents with not feeling well last several dose.  Her glucometer was reading high.  She gives history of excessive thirst and urination for last 2 days.  She also states her tongue started flailing and was not able to talk straight.  Per patient, she has been compliant with her insulin regimen at home.     When seen in the emergency room, she appears extremely dry.  She is not a very good historian.  Denies any abdominal pain, chest pain, dizziness or lightheadedness.     Workup in the emergency room revealed very high blood glucose of 1011 mg/DL, negative ketones and normal anion gap.    Assessment/Plan:      Current Principal Problem:  Hyperosmolar hyperglycemic state (HHS) (HCC)    HHS  Type II DM  Presented with blood glucose of 1011 mg/DL  No anion gap, Negative ketones in urine  Received regular insulin 10 units in the emergency room  Has been taking 8 units nightly insulin and states she has been taking 4 units Premeal.  Questionable compliance  Obtain hemoglobin A1c in the morning.  Plan was to initiate on insulin drip and admitted to ICU.  However on repeat blood sugar check in the ED, sugars improving and currently in 400s.    started on Lantus 15 units nightly ( home dose )and medium dose sliding scale.- MD ed as of hypoglycemia .  8 units lantus am , med SSI  Diabetic diet   BS reviewed - WNL        Translocational hyponatremia  Due to hyperglycemia  Sodium on presentation 121 mmol/liter  improved     Lactic acidosis  Likely due to intravascular depletion  Lactic acid 2.1 mmol/liter on

## 2024-04-17 ENCOUNTER — APPOINTMENT (OUTPATIENT)
Dept: GENERAL RADIOLOGY | Age: 64
End: 2024-04-17
Payer: MEDICAID

## 2024-04-17 LAB
ANION GAP SERPL CALCULATED.3IONS-SCNC: 9 MMOL/L (ref 3–16)
BASOPHILS # BLD: 0 K/UL (ref 0–0.2)
BASOPHILS NFR BLD: 0 %
BUN SERPL-MCNC: 32 MG/DL (ref 7–20)
CALCIUM SERPL-MCNC: 7.8 MG/DL (ref 8.3–10.6)
CHLORIDE SERPL-SCNC: 108 MMOL/L (ref 99–110)
CO2 SERPL-SCNC: 18 MMOL/L (ref 21–32)
CREAT SERPL-MCNC: 2.8 MG/DL (ref 0.6–1.2)
DEPRECATED RDW RBC AUTO: 14 % (ref 12.4–15.4)
EOSINOPHIL # BLD: 0 K/UL (ref 0–0.6)
EOSINOPHIL NFR BLD: 0 %
FLUAV RNA RESP QL NAA+PROBE: NOT DETECTED
FLUBV RNA RESP QL NAA+PROBE: NOT DETECTED
GFR SERPLBLD CREATININE-BSD FMLA CKD-EPI: 18 ML/MIN/{1.73_M2}
GLUCOSE BLD-MCNC: 111 MG/DL (ref 70–99)
GLUCOSE BLD-MCNC: 214 MG/DL (ref 70–99)
GLUCOSE BLD-MCNC: 219 MG/DL (ref 70–99)
GLUCOSE BLD-MCNC: 238 MG/DL (ref 70–99)
GLUCOSE SERPL-MCNC: 251 MG/DL (ref 70–99)
HCT VFR BLD AUTO: 24.7 % (ref 36–48)
HGB BLD-MCNC: 7.5 G/DL (ref 12–16)
IMMATURE RETIC FRACT: 0.58 (ref 0.21–0.37)
LYMPHOCYTES # BLD: 1 K/UL (ref 1–5.1)
LYMPHOCYTES NFR BLD: 19 %
MCH RBC QN AUTO: 26.9 PG (ref 26–34)
MCHC RBC AUTO-ENTMCNC: 30.3 G/DL (ref 31–36)
MCV RBC AUTO: 88.9 FL (ref 80–100)
MONOCYTES # BLD: 0.4 K/UL (ref 0–1.3)
MONOCYTES NFR BLD: 7 %
MYELOCYTES NFR BLD MANUAL: 2 %
NEUTROPHILS # BLD: 3.8 K/UL (ref 1.7–7.7)
NEUTROPHILS NFR BLD: 67 %
NEUTS BAND NFR BLD MANUAL: 5 % (ref 0–7)
PERFORMED ON: ABNORMAL
PLATELET # BLD AUTO: 223 K/UL (ref 135–450)
PLATELET BLD QL SMEAR: ADEQUATE
PMV BLD AUTO: 12 FL (ref 5–10.5)
POTASSIUM SERPL-SCNC: 4.8 MMOL/L (ref 3.5–5.1)
RBC # BLD AUTO: 2.78 M/UL (ref 4–5.2)
RETICS # AUTO: 0.07 M/UL (ref 0.02–0.1)
RETICS/RBC NFR AUTO: 2.23 % (ref 0.5–2.18)
SARS-COV-2 RNA RESP QL NAA+PROBE: NOT DETECTED
SLIDE REVIEW: ABNORMAL
SODIUM SERPL-SCNC: 135 MMOL/L (ref 136–145)
WBC # BLD AUTO: 5.1 K/UL (ref 4–11)

## 2024-04-17 PROCEDURE — 6370000000 HC RX 637 (ALT 250 FOR IP): Performed by: STUDENT IN AN ORGANIZED HEALTH CARE EDUCATION/TRAINING PROGRAM

## 2024-04-17 PROCEDURE — 6370000000 HC RX 637 (ALT 250 FOR IP): Performed by: INTERNAL MEDICINE

## 2024-04-17 PROCEDURE — 6360000002 HC RX W HCPCS: Performed by: STUDENT IN AN ORGANIZED HEALTH CARE EDUCATION/TRAINING PROGRAM

## 2024-04-17 PROCEDURE — 82728 ASSAY OF FERRITIN: CPT

## 2024-04-17 PROCEDURE — 83550 IRON BINDING TEST: CPT

## 2024-04-17 PROCEDURE — 6360000002 HC RX W HCPCS: Performed by: INTERNAL MEDICINE

## 2024-04-17 PROCEDURE — 2580000003 HC RX 258: Performed by: INTERNAL MEDICINE

## 2024-04-17 PROCEDURE — 83540 ASSAY OF IRON: CPT

## 2024-04-17 PROCEDURE — 85025 COMPLETE CBC W/AUTO DIFF WBC: CPT

## 2024-04-17 PROCEDURE — 83010 ASSAY OF HAPTOGLOBIN QUANT: CPT

## 2024-04-17 PROCEDURE — 87636 SARSCOV2 & INF A&B AMP PRB: CPT

## 2024-04-17 PROCEDURE — 71045 X-RAY EXAM CHEST 1 VIEW: CPT

## 2024-04-17 PROCEDURE — 87040 BLOOD CULTURE FOR BACTERIA: CPT

## 2024-04-17 PROCEDURE — 2060000000 HC ICU INTERMEDIATE R&B

## 2024-04-17 PROCEDURE — 2580000003 HC RX 258: Performed by: STUDENT IN AN ORGANIZED HEALTH CARE EDUCATION/TRAINING PROGRAM

## 2024-04-17 PROCEDURE — 80048 BASIC METABOLIC PNL TOTAL CA: CPT

## 2024-04-17 PROCEDURE — 85045 AUTOMATED RETICULOCYTE COUNT: CPT

## 2024-04-17 RX ORDER — INSULIN GLARGINE 100 [IU]/ML
8 INJECTION, SOLUTION SUBCUTANEOUS NIGHTLY
Status: DISCONTINUED | OUTPATIENT
Start: 2024-04-17 | End: 2024-04-18

## 2024-04-17 RX ORDER — ACETAMINOPHEN 325 MG/1
650 TABLET ORAL EVERY 6 HOURS PRN
Status: DISCONTINUED | OUTPATIENT
Start: 2024-04-17 | End: 2024-04-25 | Stop reason: HOSPADM

## 2024-04-17 RX ADMIN — VANCOMYCIN HYDROCHLORIDE 500 MG: 500 INJECTION, POWDER, LYOPHILIZED, FOR SOLUTION INTRAVENOUS at 10:22

## 2024-04-17 RX ADMIN — INSULIN LISPRO 2 UNITS: 100 INJECTION, SOLUTION INTRAVENOUS; SUBCUTANEOUS at 17:33

## 2024-04-17 RX ADMIN — ATORVASTATIN CALCIUM 40 MG: 40 TABLET, FILM COATED ORAL at 21:29

## 2024-04-17 RX ADMIN — OXYCODONE AND ACETAMINOPHEN 1 TABLET: 7.5; 325 TABLET ORAL at 21:29

## 2024-04-17 RX ADMIN — ISOSORBIDE MONONITRATE 30 MG: 30 TABLET, EXTENDED RELEASE ORAL at 08:28

## 2024-04-17 RX ADMIN — AMLODIPINE BESYLATE 10 MG: 5 TABLET ORAL at 08:28

## 2024-04-17 RX ADMIN — HEPARIN SODIUM 5000 UNITS: 5000 INJECTION INTRAVENOUS; SUBCUTANEOUS at 05:44

## 2024-04-17 RX ADMIN — INSULIN GLARGINE 8 UNITS: 100 INJECTION, SOLUTION SUBCUTANEOUS at 08:29

## 2024-04-17 RX ADMIN — CEFEPIME 1000 MG: 1 INJECTION, POWDER, FOR SOLUTION INTRAMUSCULAR; INTRAVENOUS at 17:33

## 2024-04-17 RX ADMIN — CEFEPIME 2000 MG: 2 INJECTION, POWDER, FOR SOLUTION INTRAVENOUS at 06:22

## 2024-04-17 RX ADMIN — ASPIRIN 81 MG 81 MG: 81 TABLET ORAL at 08:31

## 2024-04-17 RX ADMIN — HEPARIN SODIUM 5000 UNITS: 5000 INJECTION INTRAVENOUS; SUBCUTANEOUS at 21:29

## 2024-04-17 RX ADMIN — PANTOPRAZOLE SODIUM 40 MG: 40 TABLET, DELAYED RELEASE ORAL at 08:28

## 2024-04-17 RX ADMIN — TRAZODONE HYDROCHLORIDE 300 MG: 50 TABLET ORAL at 21:29

## 2024-04-17 RX ADMIN — HEPARIN SODIUM 5000 UNITS: 5000 INJECTION INTRAVENOUS; SUBCUTANEOUS at 13:04

## 2024-04-17 RX ADMIN — INSULIN LISPRO 2 UNITS: 100 INJECTION, SOLUTION INTRAVENOUS; SUBCUTANEOUS at 08:28

## 2024-04-17 RX ADMIN — VANCOMYCIN HYDROCHLORIDE 1000 MG: 1 INJECTION, POWDER, LYOPHILIZED, FOR SOLUTION INTRAVENOUS at 06:59

## 2024-04-17 RX ADMIN — INSULIN LISPRO 2 UNITS: 100 INJECTION, SOLUTION INTRAVENOUS; SUBCUTANEOUS at 13:03

## 2024-04-17 RX ADMIN — ACETAMINOPHEN 650 MG: 325 TABLET ORAL at 05:44

## 2024-04-17 RX ADMIN — INSULIN GLARGINE 8 UNITS: 100 INJECTION, SOLUTION SUBCUTANEOUS at 21:29

## 2024-04-17 ASSESSMENT — PAIN SCALES - GENERAL: PAINLEVEL_OUTOF10: 9

## 2024-04-17 ASSESSMENT — PAIN DESCRIPTION - LOCATION: LOCATION: BACK;HEAD

## 2024-04-17 NOTE — PROGRESS NOTES
Pt's temperature 102.7. Pt was bundled up and thermostat set on 77 degrees. Turned temp down and will re-evaluate. NP aware.    0512:  MD cross-cover notified pt's temp now 102.8. Tylenol, abx, and labs ordered. Pt now in COVID rule out.

## 2024-04-17 NOTE — CARE COORDINATION
Patient refused therapy today stating she was too tired per OT note. She refuses SNF. [facility] tentatively accepted [facility] referral and plan is home when [facility] when stable. Kidney function worsening and labs being monitored daily. Getting IV Vanc per pharmacy dosing. Per RN patient was too weak to stand today and very sleepy and lethargic and had two episodes of incontinence. Call placed to Maurice Fried, adult child listed as primary contact. Call placed to son to discuss SNF recommendations. Per RN patient seems more confused. Son is going to visit her this evening after work and talk to her about rehab. She has been to [facility] in past he believes.He will let CM know if patient will now agree and if son CM will initiate a referral and start pre-cert process for SNF if they can take her back at [facility].

## 2024-04-17 NOTE — PROGRESS NOTES
Pt very tired today. She is responsive but demands to go back to sleep with blanket over head.  Pt did not want to have vital signs taken or take morning medicine but eventually did.     Pt eating less than 50% of meals with little urine output. Fluids are ordered. [facility] culture was ordered .

## 2024-04-17 NOTE — CONSULTS
Pharmacy to Dose Vancomycin    Dx: Sepsis  Goal trough = 15-20  Pt wt = 55.6 kg  Recent Labs     04/15/24  1139 04/15/24  1352 04/16/24  0937   CREATININE 2.7* 2.8* 3.1*     Recent Labs     04/16/24  0937   WBC 7.9     Give Vancomycin 1000 mg x1  Pulse dose  Vancomycin random 4/17 1800  Matthew Davenport [facility] 4/17/2024 5:47 AM    -------------------------------------------------------------------------------------                                       Vancomycin Progress Note  Day: 1 Indication: Sepsis Other Antibiotics: Cefepime     No results for input(s): \"VANCOTROUGH\" in the last 72 hours.  No results for input(s): \"VANCORANDOM\" in the last 72 hours.  Recent Labs     04/15/24  1352 04/16/24  0937 04/17/24  0508   CREATININE 2.8* 3.1* 2.8*     Recent Labs     04/16/24  0937   WBC 7.9     Estimated Creatinine Clearance: 17 mL/min (A) (based on SCr of 2.8 mg/dL (H)).  Date Culture Results   4/17  Blood x2 Ordered; not collected yet   [facility] has been updated with administration times, serum creatinine levels, and vancomycin blood levels  Current Dosing: Pulse dosing (SHARRON on CKD)   Therapeutic Goal: Trough ~ 15 mg/L     Give another dose of Vancomycin 500 mg x1 dose for a total loading dose of 1500 mg (~25 mg/kg)  Level ordered for tomorrow (4/18) at 0600   Rupinder Goldberg PharmD 4/17/2024 8:24 AM    -------------------------------------------------------------------------------------    Vancomycin Day 2    No results for input(s): \"VANCOTROUGH\" in the last 72 hours.  Recent Labs     04/18/24  0430   VANCORANDOM 15.3     Recent Labs     04/16/24  0937 04/17/24  0508 04/18/24  0430   CREATININE 3.1* 2.8* 3.1*     Estimated Creatinine Clearance: 16 mL/min (A) (based on SCr of 3.1 mg/dL (H)).  Recent Labs     04/16/24  0937 04/17/24  0508   WBC 7.9 5.1     Plan: Will give vancomycin 500mg x1 dose  Recheck vancomycin random level 4/19 0700  Harpreet Manzo [facility] 4/18/2024 5:38 AM

## 2024-04-17 NOTE — PROGRESS NOTES
Hospital Medicine Progress Note      Date of Admission: 4/12/2024  Hospital Day: 6    Chief Admission Complaint:  elevated BS, not feeling well     Subjective:    In the bed ,  Tmax 103 last  night , no cough , SOB or hypoxia   Denies urinary c/o  but over all weak   Presenting Admission History:       Agustina Fried is a 64 y.o. female with pmh of type II DM, CAD, stage III CKD, who presents with not feeling well last several dose.  Her glucometer was reading high.  She gives history of excessive thirst and urination for last 2 days.  She also states her tongue started flailing and was not able to talk straight.  Per patient, she has been compliant with her insulin regimen at home.     When seen in the emergency room, she appears extremely dry.  She is not a very good historian.  Denies any abdominal pain, chest pain, dizziness or lightheadedness.     Workup in the emergency room revealed very high blood glucose of 1011 mg/DL, negative ketones and normal anion gap.    Assessment/Plan:      Current Principal Problem:  Hyperosmolar hyperglycemic state (HHS) (HCC)    HHS  Type II DM  Presented with blood glucose of 1011 mg/DL  No anion gap, Negative ketones in urine  Received regular insulin 10 units in the emergency room  Has been taking 8 units nightly insulin and states she has been taking 4 units Premeal.  Questionable compliance  Obtain hemoglobin A1c in the morning.  Plan was to initiate on insulin drip and admitted to ICU.  However on repeat blood sugar check in the ED, sugars improving and currently in 400s.    started on Lantus 15 units nightly ( home dose )and medium dose sliding scale.- TX ed as of hypoglycemia .  8 units lantus am , med SSI  Diabetic diet   BS reviewed - elevated   Started pm lantus   Used to take 15 unts at home         Translocational hyponatremia  Due to hyperglycemia  Sodium on presentation 121 mmol/liter  improved     Lactic acidosis  Likely due to intravascular depletion  Lactic  SubCUTAneous Nightly    cefepime  1,000 mg IntraVENous Q12H    paliperidone  3 mg Oral QAM    insulin glargine  8 Units SubCUTAneous Daily    heparin (porcine)  5,000 Units SubCUTAneous 3 times per day    amLODIPine  10 mg Oral Daily    aspirin  81 mg Oral Daily    atorvastatin  40 mg Oral Nightly    isosorbide mononitrate  30 mg Oral Daily    pantoprazole  40 mg Oral Daily    traZODone  300 mg Oral Nightly    sodium chloride  15 mL/kg IntraVENous Once    sodium chloride  1,000 mL IntraVENous Once    insulin lispro  0-8 Units SubCUTAneous TID WC    insulin lispro  0-4 Units SubCUTAneous Nightly     PRN Meds: acetaminophen, clonazePAM, glucose, dextrose bolus **OR** dextrose bolus, glucagon (rDNA), dextrose, oxyCODONE-acetaminophen, potassium chloride, magnesium sulfate, sodium phosphate 15 mmol in sodium chloride 0.9 % 250 mL IVPB, polyethylene glycol, [Held by provider] hydrALAZINE     Labs:  Personally reviewed and interpreted for clinical significance.     Recent Labs     04/16/24  0937 04/17/24  0508   WBC 7.9 5.1   HGB 7.9* 7.5*   HCT 25.3* 24.7*    223       Recent Labs     04/15/24  1352 04/16/24  0937 04/17/24  0508    140 135*   K 5.0 4.6 4.8    110 108   CO2 19* 20* 18*   BUN 29* 29* 32*   CREATININE 2.8* 3.1* 2.8*   CALCIUM 7.8* 7.6* 7.8*       No results for input(s): \"PROBNP\", \"TROPHS\" in the last 72 hours.  No results for input(s): \"LABA1C\" in the last 72 hours.    No results for input(s): \"AST\", \"ALT\", \"BILIDIR\", \"BILITOT\", \"ALKPHOS\" in the last 72 hours.    No results for input(s): \"INR\", \"LACTA\", \"TSH\" in the last 72 hours.      Urine Cultures:   Lab Results   Component Value Date/Time    LABURIN >100,000 CFU/ml 01/18/2024 04:29 PM     Blood Cultures:   Lab Results   Component Value Date/Time    BC No Growth after 4 days of incubation. 01/18/2024 02:29 PM     Lab Results   Component Value Date/Time    BLOODCULT2 No Growth after 4 days of incubation. 01/18/2024 04:50 PM

## 2024-04-17 NOTE — PROGRESS NOTES
Pt too weak to stand onto scale this morning. Seems very sleepy and lethargic. Also has had two episodes of incontinence this morning. Klever esteban MD notified.

## 2024-04-17 NOTE — PROGRESS NOTES
Ph: [phone], Fax: [phone]           [facility]               Reason for admission:                 Uncontrolled diabetes with hyperosmolar state    Brief Summary :     Agustina Fried is being seen by nephrology for SHARRON on CKD.      Interval History and plan:      Blood pressure usually around 140s to 150 systolic  On fluids  Made at least 500 mL of urine yesterday  Creatinine down to 2.8 from peak of 3.1  CO2 low  Started back on p.o. amlodipine yesterday  Urine output is not great   Has no significant pedal edema    Plan:  Continue IV fluids, supportive treatment  Avoid IV antihypertensives                     Assessment :        Acute Kidney Injury on CKD stage IIIa/  Creatinine 2.7 at the time of consult-down from peak of 2.9  Appears to be due to rapid lowering of blood pressure  Baseline creatinine 1-1.3  History of chronic diarrhea due to colectomy/recurrent SHARRON in the past  Has reduced renal mass-nephrectomy due to Wilms tumor in the past also CKD contributed by diabetes  Renal imaging-surgical absence of right kidney  Echo 9/21-no abnormality mentioned    Hypertension   BP: (156-161)/(56-67)  Pulse:  [85-86]   BP goal inpatient 130-140 systolic inpatient  Blood pressure as high as 200 systolic on presentation and went down to 112/51           [facility] would like to thank Ankur Douglas MD   for opportunity to serve this patient      Please call with questions at-   24 Hrs Answering service [phone] or  7 am- 5 pm via Perfect serve or cell phone  Dr.Sudhir Temo MD       HPI :     Agustina Fried is a 64 y.o. female presented to   the hospital on 4/12/2024 with known diabetes CAD CKD presented with feeling unwell for several days.  Also very high reading on the glucometer.  Also treated with increased thirst and urination for couple of days.  She was brought to the emergency room where she was found to have very high glucose, was treated for hyperosmolar  0.9 % 100 mL IVPB (mini-bag), 2,000 mg, IntraVENous, Q12H  acetaminophen (TYLENOL) tablet 650 mg, 650 mg, Oral, Q6H PRN  vancomycin (VANCOCIN) intermittent dosing (placeholder), , Other, RX Placeholder  paliperidone (INVEGA) extended release tablet 3 mg, 3 mg, Oral, QAM  clonazePAM (KLONOPIN) tablet 0.5 mg, 0.5 mg, Oral, Daily PRN  0.9 % sodium chloride infusion, , IntraVENous, Continuous  insulin glargine (LANTUS) injection vial 8 Units, 8 Units, SubCUTAneous, Daily  glucose chewable tablet 16 g, 4 tablet, Oral, PRN  dextrose bolus 10% 125 mL, 125 mL, IntraVENous, PRN **OR** dextrose bolus 10% 250 mL, 250 mL, IntraVENous, PRN  glucagon (rDNA) injection 1 mg, 1 mg, SubCUTAneous, PRN  dextrose 10 % infusion, , IntraVENous, Continuous PRN  heparin (porcine) injection 5,000 Units, 5,000 Units, SubCUTAneous, 3 times per day  amLODIPine (NORVASC) tablet 10 mg, 10 mg, Oral, Daily  aspirin chewable tablet 81 mg, 81 mg, Oral, Daily  atorvastatin (LIPITOR) tablet 40 mg, 40 mg, Oral, Nightly  isosorbide mononitrate (IMDUR) extended release tablet 30 mg, 30 mg, Oral, Daily  oxyCODONE-acetaminophen (PERCOCET) 7.5-325 MG per tablet 1 tablet, 1 tablet, Oral, Q6H PRN  pantoprazole (PROTONIX) tablet 40 mg, 40 mg, Oral, Daily  traZODone (DESYREL) tablet 300 mg, 300 mg, Oral, Nightly  potassium chloride 10 mEq/100 mL IVPB (Peripheral Line), 10 mEq, IntraVENous, PRN  magnesium sulfate 2000 mg in 50 mL IVPB premix, 2,000 mg, IntraVENous, PRN  sodium phosphate 15 mmol in sodium chloride 0.9 % 250 mL IVPB, 15 mmol, IntraVENous, PRN  polyethylene glycol (GLYCOLAX) packet 17 g, 17 g, Oral, Daily PRN  sodium chloride 0.9 % bolus 776 mL, 15 mL/kg, IntraVENous, Once **FOLLOWED BY** 0.9 % sodium chloride infusion, , IntraVENous, Continuous  sodium chloride 0.9 % bolus 1,000 mL, 1,000 mL, IntraVENous, Once  0.9 % sodium chloride infusion, , IntraVENous, Continuous  [Held by provider] insulin glargine (LANTUS) injection vial 15 Units, 15 Units,

## 2024-04-17 NOTE — PROGRESS NOTES
Occupational Therapy    Attempted to see pt this date for OT treatment session. Pt in bed with covers over face, reporting too tired for therapy. Educated pt on importance of participating in therapy while in hospital. Continues to decline. Will re-attempt when able. Thanks.     Pooja Pickering OTR/L

## 2024-04-17 NOTE — CARE COORDINATION
Per MD notes T max 103 last night . Hemoglobin trending down . Stool for guiac ordered and daily labs. Son to speak with patient when he visits tonight regarding SNF to see if she will consider. She previously has refused this admit. She has however been to [facility] in past. Will follow up with both son and patient in AM to see if agreeable to rehab and will start referral process if son. Will need pre-cert if agrees when medically stable. Current plan is for home with [facility]. SNF VS [facility] . CM following for discharge planning.

## 2024-04-18 ENCOUNTER — APPOINTMENT (OUTPATIENT)
Dept: ULTRASOUND IMAGING | Age: 64
End: 2024-04-18
Payer: MEDICAID

## 2024-04-18 LAB
ABO + RH BLD: NORMAL
AMORPH SED URNS QL MICRO: ABNORMAL /HPF
ANION GAP SERPL CALCULATED.3IONS-SCNC: 9 MMOL/L (ref 3–16)
ANISOCYTOSIS BLD QL SMEAR: ABNORMAL
BACTERIA URNS QL MICRO: ABNORMAL /HPF
BASOPHILS # BLD: 0 K/UL (ref 0–0.2)
BASOPHILS NFR BLD: 0 %
BILIRUB UR QL STRIP.AUTO: NEGATIVE
BLD GP AB SCN SERPL QL: NORMAL
BLOOD BANK DISPENSE STATUS: NORMAL
BLOOD BANK DISPENSE STATUS: NORMAL
BLOOD BANK PRODUCT CODE: NORMAL
BLOOD BANK PRODUCT CODE: NORMAL
BPU ID: NORMAL
BPU ID: NORMAL
BUN SERPL-MCNC: 42 MG/DL (ref 7–20)
BURR CELLS BLD QL SMEAR: ABNORMAL
CALCIUM SERPL-MCNC: 7.4 MG/DL (ref 8.3–10.6)
CHLORIDE SERPL-SCNC: 114 MMOL/L (ref 99–110)
CLARITY UR: ABNORMAL
CO2 SERPL-SCNC: 17 MMOL/L (ref 21–32)
COARSE GRAN CASTS #/AREA URNS LPF: ABNORMAL /LPF (ref 0–2)
COLOR UR: YELLOW
CREAT SERPL-MCNC: 3.1 MG/DL (ref 0.6–1.2)
DEPRECATED RDW RBC AUTO: 14.9 % (ref 12.4–15.4)
DESCRIPTION BLOOD BANK: NORMAL
DESCRIPTION BLOOD BANK: NORMAL
EOSINOPHIL # BLD: 0 K/UL (ref 0–0.6)
EOSINOPHIL NFR BLD: 0 %
EPI CELLS #/AREA URNS HPF: ABNORMAL /HPF (ref 0–5)
FERRITIN SERPL IA-MCNC: 344.3 NG/ML (ref 15–150)
GFR SERPLBLD CREATININE-BSD FMLA CKD-EPI: 16 ML/MIN/{1.73_M2}
GLUCOSE BLD-MCNC: 257 MG/DL (ref 70–99)
GLUCOSE BLD-MCNC: 269 MG/DL (ref 70–99)
GLUCOSE BLD-MCNC: 307 MG/DL (ref 70–99)
GLUCOSE BLD-MCNC: 316 MG/DL (ref 70–99)
GLUCOSE SERPL-MCNC: 262 MG/DL (ref 70–99)
GLUCOSE UR STRIP.AUTO-MCNC: 250 MG/DL
HAPTOGLOB SERPL-MCNC: 184 MG/DL (ref 30–200)
HCT VFR BLD AUTO: 17.7 % (ref 36–48)
HCT VFR BLD AUTO: 19.5 % (ref 36–48)
HCT VFR BLD AUTO: 20.2 % (ref 36–48)
HGB BLD-MCNC: 5.4 G/DL (ref 12–16)
HGB BLD-MCNC: 5.8 G/DL (ref 12–16)
HGB BLD-MCNC: 6.3 G/DL (ref 12–16)
HGB UR QL STRIP.AUTO: ABNORMAL
IRON SATN MFR SERPL: 59 % (ref 15–50)
IRON SERPL-MCNC: 107 UG/DL (ref 37–145)
KETONES UR STRIP.AUTO-MCNC: NEGATIVE MG/DL
LEUKOCYTE ESTERASE UR QL STRIP.AUTO: NEGATIVE
LYMPHOCYTES # BLD: 2.1 K/UL (ref 1–5.1)
LYMPHOCYTES NFR BLD: 29 %
MACROCYTES BLD QL SMEAR: ABNORMAL
MCH RBC QN AUTO: 27.6 PG (ref 26–34)
MCHC RBC AUTO-ENTMCNC: 29.8 G/DL (ref 31–36)
MCV RBC AUTO: 92.8 FL (ref 80–100)
MICROCYTES BLD QL SMEAR: ABNORMAL
MONOCYTES # BLD: 0.8 K/UL (ref 0–1.3)
MONOCYTES NFR BLD: 11 %
NEUTROPHILS # BLD: 4.3 K/UL (ref 1.7–7.7)
NEUTROPHILS NFR BLD: 18 %
NEUTS BAND NFR BLD MANUAL: 42 % (ref 0–7)
NEUTS VAC BLD QL SMEAR: PRESENT
NITRITE UR QL STRIP.AUTO: NEGATIVE
NRBC BLD-RTO: 1 /100 WBC
OVALOCYTES BLD QL SMEAR: ABNORMAL
PERFORMED ON: ABNORMAL
PH UR STRIP.AUTO: 5.5 [PH] (ref 5–8)
PLATELET # BLD AUTO: 231 K/UL (ref 135–450)
PLATELET BLD QL SMEAR: ADEQUATE
PMV BLD AUTO: 11.7 FL (ref 5–10.5)
POIKILOCYTOSIS BLD QL SMEAR: ABNORMAL
POLYCHROMASIA BLD QL SMEAR: ABNORMAL
POTASSIUM SERPL-SCNC: 4.9 MMOL/L (ref 3.5–5.1)
PROT UR STRIP.AUTO-MCNC: 100 MG/DL
RBC # BLD AUTO: 2.1 M/UL (ref 4–5.2)
RBC #/AREA URNS HPF: ABNORMAL /HPF (ref 0–4)
SLIDE REVIEW: ABNORMAL
SODIUM SERPL-SCNC: 140 MMOL/L (ref 136–145)
SP GR UR STRIP.AUTO: 1.02 (ref 1–1.03)
TARGETS BLD QL SMEAR: ABNORMAL
TIBC SERPL-MCNC: 181 UG/DL (ref 260–445)
UA DIPSTICK W REFLEX MICRO PNL UR: YES
URN SPEC COLLECT METH UR: ABNORMAL
UROBILINOGEN UR STRIP-ACNC: 0.2 E.U./DL
VANCOMYCIN SERPL-MCNC: 15.3 UG/ML
WBC # BLD AUTO: 7.2 K/UL (ref 4–11)
WBC #/AREA URNS HPF: ABNORMAL /HPF (ref 0–5)

## 2024-04-18 PROCEDURE — 2580000003 HC RX 258: Performed by: INTERNAL MEDICINE

## 2024-04-18 PROCEDURE — 81001 URINALYSIS AUTO W/SCOPE: CPT

## 2024-04-18 PROCEDURE — 2060000000 HC ICU INTERMEDIATE R&B

## 2024-04-18 PROCEDURE — 86923 COMPATIBILITY TEST ELECTRIC: CPT

## 2024-04-18 PROCEDURE — 76770 US EXAM ABDO BACK WALL COMP: CPT

## 2024-04-18 PROCEDURE — 6370000000 HC RX 637 (ALT 250 FOR IP): Performed by: INTERNAL MEDICINE

## 2024-04-18 PROCEDURE — 80202 ASSAY OF VANCOMYCIN: CPT

## 2024-04-18 PROCEDURE — 97530 THERAPEUTIC ACTIVITIES: CPT

## 2024-04-18 PROCEDURE — 30233N1 TRANSFUSION OF NONAUTOLOGOUS RED BLOOD CELLS INTO PERIPHERAL VEIN, PERCUTANEOUS APPROACH: ICD-10-PCS | Performed by: INTERNAL MEDICINE

## 2024-04-18 PROCEDURE — 86900 BLOOD TYPING SEROLOGIC ABO: CPT

## 2024-04-18 PROCEDURE — 6360000002 HC RX W HCPCS: Performed by: INTERNAL MEDICINE

## 2024-04-18 PROCEDURE — 97110 THERAPEUTIC EXERCISES: CPT

## 2024-04-18 PROCEDURE — 86901 BLOOD TYPING SEROLOGIC RH(D): CPT

## 2024-04-18 PROCEDURE — 36430 TRANSFUSION BLD/BLD COMPNT: CPT

## 2024-04-18 PROCEDURE — 2500000003 HC RX 250 WO HCPCS: Performed by: INTERNAL MEDICINE

## 2024-04-18 PROCEDURE — P9016 RBC LEUKOCYTES REDUCED: HCPCS

## 2024-04-18 PROCEDURE — 85018 HEMOGLOBIN: CPT

## 2024-04-18 PROCEDURE — 86850 RBC ANTIBODY SCREEN: CPT

## 2024-04-18 PROCEDURE — 36415 COLL VENOUS BLD VENIPUNCTURE: CPT

## 2024-04-18 PROCEDURE — 85014 HEMATOCRIT: CPT

## 2024-04-18 PROCEDURE — 85025 COMPLETE CBC W/AUTO DIFF WBC: CPT

## 2024-04-18 PROCEDURE — 80048 BASIC METABOLIC PNL TOTAL CA: CPT

## 2024-04-18 RX ORDER — SODIUM CHLORIDE 9 MG/ML
INJECTION, SOLUTION INTRAVENOUS PRN
Status: COMPLETED | OUTPATIENT
Start: 2024-04-18 | End: 2024-04-23

## 2024-04-18 RX ORDER — INSULIN GLARGINE 100 [IU]/ML
10 INJECTION, SOLUTION SUBCUTANEOUS NIGHTLY
Status: DISCONTINUED | OUTPATIENT
Start: 2024-04-18 | End: 2024-04-24

## 2024-04-18 RX ORDER — SODIUM CHLORIDE 9 MG/ML
INJECTION, SOLUTION INTRAVENOUS PRN
Status: DISCONTINUED | OUTPATIENT
Start: 2024-04-18 | End: 2024-04-25 | Stop reason: HOSPADM

## 2024-04-18 RX ADMIN — INSULIN GLARGINE 10 UNITS: 100 INJECTION, SOLUTION SUBCUTANEOUS at 21:50

## 2024-04-18 RX ADMIN — INSULIN LISPRO 4 UNITS: 100 INJECTION, SOLUTION INTRAVENOUS; SUBCUTANEOUS at 12:36

## 2024-04-18 RX ADMIN — VANCOMYCIN HYDROCHLORIDE 500 MG: 500 INJECTION, POWDER, LYOPHILIZED, FOR SOLUTION INTRAVENOUS at 08:42

## 2024-04-18 RX ADMIN — ATORVASTATIN CALCIUM 40 MG: 40 TABLET, FILM COATED ORAL at 19:50

## 2024-04-18 RX ADMIN — INSULIN GLARGINE 8 UNITS: 100 INJECTION, SOLUTION SUBCUTANEOUS at 08:39

## 2024-04-18 RX ADMIN — INSULIN LISPRO 6 UNITS: 100 INJECTION, SOLUTION INTRAVENOUS; SUBCUTANEOUS at 08:39

## 2024-04-18 RX ADMIN — PALIPERIDONE 3 MG: 3 TABLET, EXTENDED RELEASE ORAL at 08:39

## 2024-04-18 RX ADMIN — HEPARIN SODIUM 5000 UNITS: 5000 INJECTION INTRAVENOUS; SUBCUTANEOUS at 06:00

## 2024-04-18 RX ADMIN — PANTOPRAZOLE SODIUM 40 MG: 40 TABLET, DELAYED RELEASE ORAL at 08:39

## 2024-04-18 RX ADMIN — OXYCODONE AND ACETAMINOPHEN 1 TABLET: 7.5; 325 TABLET ORAL at 19:27

## 2024-04-18 RX ADMIN — AMLODIPINE BESYLATE 10 MG: 5 TABLET ORAL at 08:39

## 2024-04-18 RX ADMIN — SODIUM BICARBONATE: 84 INJECTION, SOLUTION INTRAVENOUS at 12:37

## 2024-04-18 RX ADMIN — SODIUM CHLORIDE: 9 INJECTION, SOLUTION INTRAVENOUS at 01:00

## 2024-04-18 RX ADMIN — INSULIN LISPRO 6 UNITS: 100 INJECTION, SOLUTION INTRAVENOUS; SUBCUTANEOUS at 16:41

## 2024-04-18 RX ADMIN — ISOSORBIDE MONONITRATE 30 MG: 30 TABLET, EXTENDED RELEASE ORAL at 08:39

## 2024-04-18 RX ADMIN — HEPARIN SODIUM 5000 UNITS: 5000 INJECTION INTRAVENOUS; SUBCUTANEOUS at 12:36

## 2024-04-18 RX ADMIN — OXYCODONE AND ACETAMINOPHEN 1 TABLET: 7.5; 325 TABLET ORAL at 12:37

## 2024-04-18 RX ADMIN — TRAZODONE HYDROCHLORIDE 300 MG: 50 TABLET ORAL at 19:50

## 2024-04-18 RX ADMIN — CEFEPIME 1000 MG: 1 INJECTION, POWDER, FOR SOLUTION INTRAMUSCULAR; INTRAVENOUS at 06:00

## 2024-04-18 RX ADMIN — CEFEPIME 1000 MG: 1 INJECTION, POWDER, FOR SOLUTION INTRAMUSCULAR; INTRAVENOUS at 16:28

## 2024-04-18 RX ADMIN — ASPIRIN 81 MG 81 MG: 81 TABLET ORAL at 08:39

## 2024-04-18 ASSESSMENT — PAIN DESCRIPTION - ORIENTATION
ORIENTATION: MID
ORIENTATION: MID

## 2024-04-18 ASSESSMENT — PAIN DESCRIPTION - LOCATION
LOCATION: BACK

## 2024-04-18 ASSESSMENT — PAIN DESCRIPTION - FREQUENCY: FREQUENCY: INTERMITTENT

## 2024-04-18 ASSESSMENT — PAIN SCALES - GENERAL
PAINLEVEL_OUTOF10: 10
PAINLEVEL_OUTOF10: 10
PAINLEVEL_OUTOF10: 0
PAINLEVEL_OUTOF10: 0
PAINLEVEL_OUTOF10: 10

## 2024-04-18 ASSESSMENT — PAIN DESCRIPTION - DESCRIPTORS
DESCRIPTORS: ACHING

## 2024-04-18 ASSESSMENT — PAIN DESCRIPTION - PAIN TYPE: TYPE: CHRONIC PAIN

## 2024-04-18 ASSESSMENT — PAIN - FUNCTIONAL ASSESSMENT: PAIN_FUNCTIONAL_ASSESSMENT: ACTIVITIES ARE NOT PREVENTED

## 2024-04-18 NOTE — CARE COORDINATION
Patient refused to work with PT today. She continues to lay in bed with covers over her face and states she is too tired. She did work with OT who recommend SNF. Patient continues to refuse stating she will go home with [facility]. [facility] accepted referral per previous CM note.

## 2024-04-18 NOTE — PROGRESS NOTES
Comprehensive Nutrition Assessment    Type and Reason for Visit:  Initial, RD Nutrition Re-Screen/LOS    Nutrition Recommendations/Plan:   Modify to CC4 diet and encourage po intakes  Add glucerna with meals   Monitor nutrition adequacy, pertinent labs, bowel habits, wt changes, and clinical progress     Malnutrition Assessment:  Malnutrition Status:  No malnutrition (04/18/24 1431)      Nutrition Assessment:    LOS: 65 yo F w pmh CAD, breast cancer, DM, HLD, HTN, CKD 3 admitted with hyperosmolar hyperglycemia. Nephrololgy following. Currently on CC4 low potassium diet, po intakes % per EMR. Pt reports having an appetite and being able to eat her meals. Denies recent weight loss, EMR reflects this. Reports drinking three protein drinks/day in addition to her meals. Requesting ONS to be added to meals while here. No further questions at time of visit. Recommend liberalizing diet to promote meal options. Encourage intakes as tolerated, will monitor.    Nutrition Related Findings:    -316 x24 hrs. x1 BM 4/17. K 4.9. Wound Type: None       Current Nutrition Intake & Therapies:    Average Meal Intake: %  Average Supplements Intake: None Ordered  ADULT DIET; Regular; 4 carb choices (60 gm/meal); Low Potassium (Less than 3000 mg/day)    Anthropometric Measures:  Height: 157.5 cm (5' 2\")  Ideal Body Weight (IBW): 110 lbs (50 kg)       Current Body Weight: 60.5 kg (133 lb 6.1 oz),   IBW. Weight Source: Standing Scale  Current BMI (kg/m2): 24.4  Usual Body Weight: 56.9 kg (125 lb 7.1 oz) (1/25/24 standing scale)  % Weight Change (Calculated): 6.3  Weight Adjustment For: No Adjustment                 BMI Categories: Normal Weight (BMI 22.0 to 24.9) age over 65    Estimated Daily Nutrient Needs:  Energy Requirements Based On: Kcal/kg (25-30)  Weight Used for Energy Requirements: Current (61)  Energy (kcal/day): 8257-9770 kcals  Weight Used for Protein Requirements: Current (1-1.2)  Protein (g/day): 61-73

## 2024-04-18 NOTE — PROGRESS NOTES
Hospital Medicine Progress Note      Date of Admission: 4/12/2024  Hospital Day: 7    Chief Admission Complaint:  elevated BS, not feeling well     Subjective:    In the bed ,  Tmax 103 last  night , no cough , SOB or hypoxia   Denies urinary c/o  but over all weak   Presenting Admission History:       Agustina Fried is a 64 y.o. female with pmh of type II DM, CAD, stage III CKD, who presents with not feeling well last several dose.  Her glucometer was reading high.  She gives history of excessive thirst and urination for last 2 days.  She also states her tongue started flailing and was not able to talk straight.  Per patient, she has been compliant with her insulin regimen at home.     When seen in the emergency room, she appears extremely dry.  She is not a very good historian.  Denies any abdominal pain, chest pain, dizziness or lightheadedness.     Workup in the emergency room revealed very high blood glucose of 1011 mg/DL, negative ketones and normal anion gap.    Assessment/Plan:      Current Principal Problem:  Hyperosmolar hyperglycemic state (HHS) (HCC)    HHS  Type II DM  Presented with blood glucose of 1011 mg/DL  No anion gap, Negative ketones in urine  Received regular insulin 10 units in the emergency room  Has been taking 8 units nightly insulin and states she has been taking 4 units Premeal.  Questionable compliance  Obtain hemoglobin A1c in the morning.  Plan was to initiate on insulin drip and admitted to ICU.  However on repeat blood sugar check in the ED, sugars improving and currently in 400s.    started on Lantus 15 units nightly ( home dose )and medium dose sliding scale.- CA ed as of hypoglycemia .  8 units lantus am , med SSI  Diabetic diet   BS reviewed - elevated   Started pm lantus  8 units   Used to take 15 unts at home   BS reviewed - in upper 100s or 200 s             Translocational hyponatremia  Due to hyperglycemia  Sodium on presentation 121 mmol/liter  improved     Lactic      No Growth to date.  Any change in status will be called.     Lab Results   Component Value Date/Time    BLOODCULT2  04/17/2024 06:19 AM     No Growth to date.  Any change in status will be called.     Organism:   Lab Results   Component Value Date/Time    ORG Proteus mirabilis 01/18/2024 04:29 PM         Ankur Douglas MD

## 2024-04-18 NOTE — PROGRESS NOTES
Ph: [phone], Fax: [phone]           [facility]               Reason for admission:                 Uncontrolled diabetes with hyperosmolar state    Brief Summary :     Agustina Fried is being seen by nephrology for SHARRON on CKD.      Interval History and plan:     Blood pressure is now normal  Had temperature yesterday and also significant drop in blood pressure  Got a dose of vancomycin  Creatinine going up  On fluids  Bicarb going down    Plan:  Continue current medications including amlodipine  Switch fluids to bicarbonate drip  Renal ultrasound  Agree with plan to stop vancomycin due to rising creatinine   however  SHARRON unlikely to be from vancomycin and due to variable blood pressure and spiking temperature leading to vasodilatation and poor renal flow                     Assessment :        Acute Kidney Injury on CKD stage IIIa/  Creatinine 2.7 at the time of consult-down from peak of 2.9  Appears to be due to rapid lowering of blood pressure  Baseline creatinine 1-1.3  History of chronic diarrhea due to colectomy/recurrent SHARRON in the past  Has reduced renal mass-nephrectomy due to Wilms tumor in the past also CKD contributed by diabetes  Renal imaging-surgical absence of right kidney  Echo 9/21-no abnormality mentioned    Hypertension   BP: (130-148)/(60-61)  Pulse:  [80-88]   BP goal inpatient 130-140 systolic inpatient  Blood pressure as high as 200 systolic on presentation and went down to 112/51           [facility] would like to thank Ankur Douglas MD   for opportunity to serve this patient      Please call with questions at-   24 Hrs Answering service [phone] or  7 am- 5 pm via Perfect serve or cell phone  Dr.Sudhir Temo MD       HPI :     Agustina Fried is a 64 y.o. female presented to   the hospital on 4/12/2024 with known diabetes CAD CKD presented with feeling unwell for several days.  Also very high reading on the glucometer.  Also treated with

## 2024-04-18 NOTE — PROGRESS NOTES
Occupational Therapy  Facility/Department: [facility] C4 PCU  Daily Treatment Note  NAME: Agustina Fried  : 1960  MRN: [id number]    Date of Service: 2024    Discharge Recommendations:  Subacute/Skilled Nursing Facility  OT Equipment Recommendations  Equipment Needed: No (defer)    Therapy discharge recommendations are subject to collaboration from the patient’s interdisciplinary healthcare team, including MD and case management recommendations.    Barriers to Home Discharge:   [x] Steps to access home entry or bed/bath:   [x] Unable to transfer, ambulate, or propel wheelchair household distances without assist   [x] Limited available assist at home upon discharge    [] Patient or family requests d/c to post-acute facility    [x] Poor cognition/safety awareness for d/c to home alone    [] Unable to maintain ordered weight bearing status    [] Patient with salient signs of long-standing immobility   [x] Decreased independence with ADLs   [x] Increased risk for falls   [] Other:    If pt is unable to be seen after this session, please let this note serve as discharge summary.  Please see case management note for discharge disposition.  Thank you.  Patient Diagnosis(es): The primary encounter diagnosis was Hyperglycemia. A diagnosis of Nausea and vomiting, unspecified vomiting type was also pertinent to this visit.     Assessment    Assessment: Pt w/ fair tolerance to session remaining limited by endurance, fatigue and decreased balance. Pt agreeable to session w/ encouragement but inc time required once pt seated EOB as pt perseverating on \"being cold\" and \"my hands hurting so bad.\" OT provided therex to complete to reduce muscle tightness and inc blood flow and pt completed x5 reps grasp/release w/ max encouragement/ cues. Pt eventually agreeable to complete mobility to other side of room to chair and OT assisted w/ ordering pt breakfast tray as pt had not yet received tray at time of session. While awaiting

## 2024-04-18 NOTE — PROGRESS NOTES
Physical Therapy    PT attempted to see pt for follow up PT session this morning however pt refusing to participate. Pt in bed with blankets covering head and reports fatigue. Educated pt on the importance of mobility however pt continues to decline.   Will follow up with pt as schedule allows and per POC.  Thank you,  Margoth Haley, PT, DPT

## 2024-04-19 ENCOUNTER — APPOINTMENT (OUTPATIENT)
Dept: CT IMAGING | Age: 64
End: 2024-04-19
Payer: MEDICAID

## 2024-04-19 LAB
ANION GAP SERPL CALCULATED.3IONS-SCNC: 9 MMOL/L (ref 3–16)
BASOPHILS # BLD: 0 K/UL (ref 0–0.2)
BASOPHILS NFR BLD: 0.4 %
BETA-HYDROXYBUTYRATE: 0.07 MMOL/L (ref 0–0.27)
BUN SERPL-MCNC: 44 MG/DL (ref 7–20)
CALCIUM SERPL-MCNC: 7.9 MG/DL (ref 8.3–10.6)
CHLORIDE SERPL-SCNC: 113 MMOL/L (ref 99–110)
CO2 SERPL-SCNC: 19 MMOL/L (ref 21–32)
CREAT SERPL-MCNC: 2.9 MG/DL (ref 0.6–1.2)
DEPRECATED RDW RBC AUTO: 15.4 % (ref 12.4–15.4)
EOSINOPHIL # BLD: 0.1 K/UL (ref 0–0.6)
EOSINOPHIL NFR BLD: 1.2 %
GFR SERPLBLD CREATININE-BSD FMLA CKD-EPI: 17 ML/MIN/{1.73_M2}
GLUCOSE BLD-MCNC: 195 MG/DL (ref 70–99)
GLUCOSE BLD-MCNC: 213 MG/DL (ref 70–99)
GLUCOSE BLD-MCNC: 221 MG/DL (ref 70–99)
GLUCOSE BLD-MCNC: 222 MG/DL (ref 70–99)
GLUCOSE SERPL-MCNC: 212 MG/DL (ref 70–99)
HAPTOGLOB SERPL-MCNC: 138 MG/DL (ref 30–200)
HCT VFR BLD AUTO: 25.2 % (ref 36–48)
HCT VFR BLD AUTO: 25.7 % (ref 36–48)
HCT VFR BLD AUTO: 28 % (ref 36–48)
HEMOCCULT SP1 STL QL: NORMAL
HGB BLD-MCNC: 8 G/DL (ref 12–16)
HGB BLD-MCNC: 8.1 G/DL (ref 12–16)
HGB BLD-MCNC: 8.8 G/DL (ref 12–16)
IMMATURE RETIC FRACT: 0.55 (ref 0.21–0.37)
LYMPHOCYTES # BLD: 2.3 K/UL (ref 1–5.1)
LYMPHOCYTES NFR BLD: 23.5 %
MCH RBC QN AUTO: 28.2 PG (ref 26–34)
MCHC RBC AUTO-ENTMCNC: 31.7 G/DL (ref 31–36)
MCV RBC AUTO: 88.9 FL (ref 80–100)
MONOCYTES # BLD: 0.9 K/UL (ref 0–1.3)
MONOCYTES NFR BLD: 9.2 %
NEUTROPHILS # BLD: 6.3 K/UL (ref 1.7–7.7)
NEUTROPHILS NFR BLD: 65.7 %
PERFORMED ON: ABNORMAL
PLATELET # BLD AUTO: 248 K/UL (ref 135–450)
PMV BLD AUTO: 8.9 FL (ref 5–10.5)
POTASSIUM SERPL-SCNC: 4.9 MMOL/L (ref 3.5–5.1)
POTASSIUM SERPL-SCNC: 4.9 MMOL/L (ref 3.5–5.1)
RBC # BLD AUTO: 2.89 M/UL (ref 4–5.2)
RETICS # AUTO: 0.08 M/UL (ref 0.02–0.1)
RETICS/RBC NFR AUTO: 2.63 % (ref 0.5–2.18)
SODIUM SERPL-SCNC: 141 MMOL/L (ref 136–145)
WBC # BLD AUTO: 9.6 K/UL (ref 4–11)

## 2024-04-19 PROCEDURE — 6370000000 HC RX 637 (ALT 250 FOR IP): Performed by: INTERNAL MEDICINE

## 2024-04-19 PROCEDURE — 6360000002 HC RX W HCPCS: Performed by: INTERNAL MEDICINE

## 2024-04-19 PROCEDURE — 82010 KETONE BODYS QUAN: CPT

## 2024-04-19 PROCEDURE — 82270 OCCULT BLOOD FECES: CPT

## 2024-04-19 PROCEDURE — 36415 COLL VENOUS BLD VENIPUNCTURE: CPT

## 2024-04-19 PROCEDURE — 2060000000 HC ICU INTERMEDIATE R&B

## 2024-04-19 PROCEDURE — 83010 ASSAY OF HAPTOGLOBIN QUANT: CPT

## 2024-04-19 PROCEDURE — 85018 HEMOGLOBIN: CPT

## 2024-04-19 PROCEDURE — 80048 BASIC METABOLIC PNL TOTAL CA: CPT

## 2024-04-19 PROCEDURE — 85014 HEMATOCRIT: CPT

## 2024-04-19 PROCEDURE — 2580000003 HC RX 258: Performed by: INTERNAL MEDICINE

## 2024-04-19 PROCEDURE — 2500000003 HC RX 250 WO HCPCS: Performed by: INTERNAL MEDICINE

## 2024-04-19 PROCEDURE — 85045 AUTOMATED RETICULOCYTE COUNT: CPT

## 2024-04-19 PROCEDURE — 85025 COMPLETE CBC W/AUTO DIFF WBC: CPT

## 2024-04-19 PROCEDURE — 36430 TRANSFUSION BLD/BLD COMPNT: CPT

## 2024-04-19 PROCEDURE — 74176 CT ABD & PELVIS W/O CONTRAST: CPT

## 2024-04-19 RX ORDER — GLUCAGON 1 MG/ML
1 KIT INJECTION PRN
Status: DISCONTINUED | OUTPATIENT
Start: 2024-04-19 | End: 2024-04-25 | Stop reason: HOSPADM

## 2024-04-19 RX ADMIN — PANTOPRAZOLE SODIUM 40 MG: 40 TABLET, DELAYED RELEASE ORAL at 08:51

## 2024-04-19 RX ADMIN — SODIUM BICARBONATE: 84 INJECTION, SOLUTION INTRAVENOUS at 08:52

## 2024-04-19 RX ADMIN — INSULIN LISPRO 2 UNITS: 100 INJECTION, SOLUTION INTRAVENOUS; SUBCUTANEOUS at 16:14

## 2024-04-19 RX ADMIN — PALIPERIDONE 3 MG: 3 TABLET, EXTENDED RELEASE ORAL at 08:51

## 2024-04-19 RX ADMIN — INSULIN LISPRO 2 UNITS: 100 INJECTION, SOLUTION INTRAVENOUS; SUBCUTANEOUS at 12:04

## 2024-04-19 RX ADMIN — ISOSORBIDE MONONITRATE 30 MG: 30 TABLET, EXTENDED RELEASE ORAL at 08:50

## 2024-04-19 RX ADMIN — INSULIN GLARGINE 8 UNITS: 100 INJECTION, SOLUTION SUBCUTANEOUS at 08:51

## 2024-04-19 RX ADMIN — INSULIN GLARGINE 10 UNITS: 100 INJECTION, SOLUTION SUBCUTANEOUS at 20:13

## 2024-04-19 RX ADMIN — AMLODIPINE BESYLATE 10 MG: 5 TABLET ORAL at 08:51

## 2024-04-19 RX ADMIN — OXYCODONE AND ACETAMINOPHEN 1 TABLET: 7.5; 325 TABLET ORAL at 17:51

## 2024-04-19 RX ADMIN — CEFEPIME 1000 MG: 1 INJECTION, POWDER, FOR SOLUTION INTRAMUSCULAR; INTRAVENOUS at 05:51

## 2024-04-19 RX ADMIN — ATORVASTATIN CALCIUM 40 MG: 40 TABLET, FILM COATED ORAL at 20:12

## 2024-04-19 ASSESSMENT — PAIN DESCRIPTION - LOCATION
LOCATION: BACK
LOCATION: BACK

## 2024-04-19 ASSESSMENT — PAIN SCALES - GENERAL
PAINLEVEL_OUTOF10: 0
PAINLEVEL_OUTOF10: 8
PAINLEVEL_OUTOF10: 5

## 2024-04-19 ASSESSMENT — PAIN DESCRIPTION - ORIENTATION: ORIENTATION: MID

## 2024-04-19 ASSESSMENT — PAIN DESCRIPTION - DESCRIPTORS
DESCRIPTORS: ACHING
DESCRIPTORS: ACHING

## 2024-04-19 ASSESSMENT — PAIN DESCRIPTION - PAIN TYPE: TYPE: CHRONIC PAIN

## 2024-04-19 NOTE — PROGRESS NOTES
Physical Therapy    PT attempted to see pt for follow up PT session this morning however pt being transported off the floor at time of attempt. Will follow up with pt as schedule allows and per POC.  Thank you,  Margoth Haley, PT, DPT

## 2024-04-19 NOTE — PROGRESS NOTES
Ph: [phone], Fax: [phone]           [facility]               Reason for admission:                 Uncontrolled diabetes with hyperosmolar state    Brief Summary :     Agustina Fried is being seen by nephrology for SHARRON on CKD.  Initially admitted with uncontrolled diabetes and hyperosmolar state being admitted to ICU since got better in the ER itself  However course of hospitalization is complicated by variable blood pressure and SHARRON suspected to be ATN.  Also has significant drop of hemoglobin      Interval History and plan:     Her blood pressure has dropped significantly to as low as 112 systolic and 58 diastolic yesterday  Urine output also dropped to 600 mL  Has significant drop in hemoglobin but no evidence of GI bleed and platelets are normal no suspicion of TTP/HUS  Made 800 ml/urine    Cr slightly better  Bicarb getting better      Plan:  Again creatinine is rising likely due to acute drop in blood pressure and also significant loss of hemoglobin  Agree with CT scan of the abdomen with contrast to look for retroperitoneal hematoma  Also check for hemolysis  She is lethargic-agree with stopping cefepime which is associated with encephalopathy in patient with advanced SHARRON  Stop Imdur and amlodipine  Dose of paliperidone has been from 9 mg to 3 mg/day due to SHARRON- hold for now due to AMS  Discussed with Dr Douglas    Cr slightly better but has anasarca  Hold off on iv fluids  Give iv albumin 12.5 twice for now  Will need diuretics at some point  But no sob, and on RA, at this time                    Assessment :        Acute Kidney Injury on CKD stage IIIa/  Creatinine 2.7 at the time of consult-down from peak of 2.9  Appears to be due to rapid lowering of blood pressure  Baseline creatinine 1-1.3  History of chronic diarrhea due to colectomy/recurrent SHARRON in the past  Has reduced renal mass-nephrectomy due to Wilms tumor in the past also CKD contributed by diabetes  Renal

## 2024-04-19 NOTE — PROGRESS NOTES
Hospital Medicine Progress Note      Date of Admission: 4/12/2024  Hospital Day: 8    Chief Admission Complaint:  elevated BS, not feeling well     Subjective:    In the bed ,  no cough , SOB or hypoxia   Denies urinary c/o  but over all weak   No melena , ab pain , N or V  Presenting Admission History:       Agustina Fried is a 64 y.o. female with pmh of type II DM, CAD, stage III CKD, who presents with not feeling well last several dose.  Her glucometer was reading high.  She gives history of excessive thirst and urination for last 2 days.  She also states her tongue started flailing and was not able to talk straight.  Per patient, she has been compliant with her insulin regimen at home.     When seen in the emergency room, she appears extremely dry.  She is not a very good historian.  Denies any abdominal pain, chest pain, dizziness or lightheadedness.     Workup in the emergency room revealed very high blood glucose of 1011 mg/DL, negative ketones and normal anion gap.    Assessment/Plan:      Current Principal Problem:  Hyperosmolar hyperglycemic state (HHS) ([facility])    HHS  Type II DM  Presented with blood glucose of 1011 mg/DL  No anion gap, Negative ketones in urine  Received regular insulin 10 units in the emergency room  Has been taking 8 units nightly insulin and states she has been taking 4 units Premeal.  Questionable compliance  Obtain hemoglobin A1c in the morning.  Plan was to initiate on insulin drip and admitted to ICU.  However on repeat blood sugar check in the ED, sugars improving and currently in 400s.    started on Lantus 15 units nightly ( home dose )and medium dose sliding scale.- DE ed as of hypoglycemia .  8 units lantus am , med SSI  Diabetic diet   BS reviewed - elevated   Started pm lantus  8 units   Used to take 15 unts at home   BS reviewed - in upper 100s or 200 s -       Translocational hyponatremia  Due to hyperglycemia  Sodium on presentation 121 mmol/liter  improved     Lactic  acidosis  Likely due to intravascular depletion  Lactic acid 2.1 mmol/liter on presentation, repeat 3.7  Received IVF bolus  Will recheck- normalized     Fever ?HCAP-currently on broad-spectrum antibiotics follow-up septic workup flu and COVID-negative  Maxipime , vanco    Septic w/u neg   Dced abx vanco and maxipime as of worsening renal function   No fever now   Rocephin and Zithromax to finish the course      Chip on CKD stage III 2/2 drop in BP?  Creatinine  bumped to 2.8   IVF , avoid nephrotoxic meds   IVF, I/O  Given nephrectomy Nephrology eval appreciated    Following   Renal US - lft WNL  H/o nephrectomy   Given A-a gap - acidosis 2/2 CHIP- - Hco3 was started   A-a gap resolved        History of anxiety/depression on Invega,  hold as of renal failure      History of CAD  On aspirin and Lipitor, continue  Last echocardiogram 1/18/2024, EF 50 to 55%  Aspirin on hold as of anemia     HTN -    elevated  -  Norvasc  resumed , hold hydralazine to avoid  rapid fluctuations    As of soft BP , Norvasc o  hold     Chest pain - atypical- 12 EKG - WNL , no new changes    Anxiety - klonopin     Hyperkalemia -hemolyzed? -  rpt  WNL     Anemia-hemoglobin is trending down   Big drop 10 to 5 on 4/18   S/p prbc 4/18   Consent obtained   Stool guaiac - neg   Iron studies reticulocyte count-WNL  Monitor H&H  CT a / pelvis - no evidence of retroperitoneal bleed   GI eval     Physical Exam Performed:      General appearance:  No apparent distress  Respiratory:  Normal respiratory effort.   Cardiovascular:  Regular rate and rhythm.  Abdomen:  Soft, non-tender, non-distended.  Musculoskeletal:  No edema  Neurologic:  Non-focal  Psychiatric:  Alert and oriented    /63   Pulse 69   Temp 98.1 °F (36.7 °C) (Oral)   Resp 20   Ht 1.575 m (5' 2\")   Wt 58.9 kg (129 lb 12.8 oz)   SpO2 96%   BMI 23.74 kg/m²     Diet: ADULT DIET; Regular; 4 carb choices (60 gm/meal)  ADULT ORAL NUTRITION SUPPLEMENT; Breakfast, Lunch, Dinner;

## 2024-04-19 NOTE — PROGRESS NOTES
04/19/24 1218   Encounter Summary   Encounter Overview/Reason  Volunteer Encounter   Service Provided For: Patient   Last Encounter  04/19/24  (Pastoral Care Volunteer)   Assessment/Intervention/Outcome   Intervention Prayer (assurance of)/[place]

## 2024-04-19 NOTE — CARE COORDINATION
Call placed to intake at [facility] to verify they have accepted and are following per previous cm note. Patient has refused SNF several times and states her friend will help her and her son. Antibiotics discontinued due to worsening renal function. Received blood transfusion yesterday for drop in hemoglobin from 10 to 5. GI consult pending. Will continue to follow for d/c planning.

## 2024-04-19 NOTE — CONSULTS
Called consult to [facility] yousif 4:15 pm. 4/19/24. They stated they could not release the information on who the provider would be??

## 2024-04-20 LAB
ANION GAP SERPL CALCULATED.3IONS-SCNC: 10 MMOL/L (ref 3–16)
BASOPHILS # BLD: 0 K/UL (ref 0–0.2)
BASOPHILS NFR BLD: 0.6 %
BUN SERPL-MCNC: 42 MG/DL (ref 7–20)
CALCIUM SERPL-MCNC: 7.6 MG/DL (ref 8.3–10.6)
CHLORIDE SERPL-SCNC: 110 MMOL/L (ref 99–110)
CO2 SERPL-SCNC: 19 MMOL/L (ref 21–32)
CREAT SERPL-MCNC: 2.9 MG/DL (ref 0.6–1.2)
DEPRECATED RDW RBC AUTO: 15.7 % (ref 12.4–15.4)
EOSINOPHIL # BLD: 0.1 K/UL (ref 0–0.6)
EOSINOPHIL NFR BLD: 1.2 %
GFR SERPLBLD CREATININE-BSD FMLA CKD-EPI: 17 ML/MIN/{1.73_M2}
GLUCOSE BLD-MCNC: 195 MG/DL (ref 70–99)
GLUCOSE BLD-MCNC: 209 MG/DL (ref 70–99)
GLUCOSE BLD-MCNC: 272 MG/DL (ref 70–99)
GLUCOSE SERPL-MCNC: 229 MG/DL (ref 70–99)
HCT VFR BLD AUTO: 24.5 % (ref 36–48)
HCT VFR BLD AUTO: 25.5 % (ref 36–48)
HGB BLD-MCNC: 7.9 G/DL (ref 12–16)
HGB BLD-MCNC: 8 G/DL (ref 12–16)
LYMPHOCYTES # BLD: 2 K/UL (ref 1–5.1)
LYMPHOCYTES NFR BLD: 25.5 %
MCH RBC QN AUTO: 29 PG (ref 26–34)
MCHC RBC AUTO-ENTMCNC: 32.2 G/DL (ref 31–36)
MCV RBC AUTO: 90 FL (ref 80–100)
MONOCYTES # BLD: 0.8 K/UL (ref 0–1.3)
MONOCYTES NFR BLD: 9.8 %
NEUTROPHILS # BLD: 5 K/UL (ref 1.7–7.7)
NEUTROPHILS NFR BLD: 62.9 %
PERFORMED ON: ABNORMAL
PLATELET # BLD AUTO: 221 K/UL (ref 135–450)
PMV BLD AUTO: 9.9 FL (ref 5–10.5)
POTASSIUM SERPL-SCNC: 5.2 MMOL/L (ref 3.5–5.1)
RBC # BLD AUTO: 2.73 M/UL (ref 4–5.2)
SODIUM SERPL-SCNC: 139 MMOL/L (ref 136–145)
WBC # BLD AUTO: 7.9 K/UL (ref 4–11)

## 2024-04-20 PROCEDURE — 85025 COMPLETE CBC W/AUTO DIFF WBC: CPT

## 2024-04-20 PROCEDURE — 36415 COLL VENOUS BLD VENIPUNCTURE: CPT

## 2024-04-20 PROCEDURE — 80048 BASIC METABOLIC PNL TOTAL CA: CPT

## 2024-04-20 PROCEDURE — 85014 HEMATOCRIT: CPT

## 2024-04-20 PROCEDURE — 6370000000 HC RX 637 (ALT 250 FOR IP): Performed by: STUDENT IN AN ORGANIZED HEALTH CARE EDUCATION/TRAINING PROGRAM

## 2024-04-20 PROCEDURE — 6370000000 HC RX 637 (ALT 250 FOR IP): Performed by: INTERNAL MEDICINE

## 2024-04-20 PROCEDURE — 2060000000 HC ICU INTERMEDIATE R&B

## 2024-04-20 PROCEDURE — 6360000002 HC RX W HCPCS: Performed by: INTERNAL MEDICINE

## 2024-04-20 PROCEDURE — 85018 HEMOGLOBIN: CPT

## 2024-04-20 PROCEDURE — 2580000003 HC RX 258: Performed by: INTERNAL MEDICINE

## 2024-04-20 RX ORDER — ONDANSETRON 4 MG/1
4 TABLET, ORALLY DISINTEGRATING ORAL EVERY 8 HOURS PRN
Status: DISCONTINUED | OUTPATIENT
Start: 2024-04-20 | End: 2024-04-25 | Stop reason: HOSPADM

## 2024-04-20 RX ORDER — ONDANSETRON 2 MG/ML
4 INJECTION INTRAMUSCULAR; INTRAVENOUS EVERY 8 HOURS PRN
Status: DISCONTINUED | OUTPATIENT
Start: 2024-04-20 | End: 2024-04-25 | Stop reason: HOSPADM

## 2024-04-20 RX ORDER — CITRIC ACID/SODIUM CITRATE 334-500MG
30 SOLUTION, ORAL ORAL ONCE
Status: COMPLETED | OUTPATIENT
Start: 2024-04-20 | End: 2024-04-20

## 2024-04-20 RX ADMIN — INSULIN LISPRO 2 UNITS: 100 INJECTION, SOLUTION INTRAVENOUS; SUBCUTANEOUS at 08:45

## 2024-04-20 RX ADMIN — INSULIN GLARGINE 8 UNITS: 100 INJECTION, SOLUTION SUBCUTANEOUS at 08:45

## 2024-04-20 RX ADMIN — SODIUM CITRATE AND CITRIC ACID MONOHYDRATE 30 ML: 500; 334 SOLUTION ORAL at 13:26

## 2024-04-20 RX ADMIN — OXYCODONE AND ACETAMINOPHEN 1 TABLET: 7.5; 325 TABLET ORAL at 02:50

## 2024-04-20 RX ADMIN — OXYCODONE AND ACETAMINOPHEN 1 TABLET: 7.5; 325 TABLET ORAL at 19:57

## 2024-04-20 RX ADMIN — PANTOPRAZOLE SODIUM 40 MG: 40 TABLET, DELAYED RELEASE ORAL at 08:45

## 2024-04-20 RX ADMIN — ATORVASTATIN CALCIUM 40 MG: 40 TABLET, FILM COATED ORAL at 19:57

## 2024-04-20 RX ADMIN — AZITHROMYCIN MONOHYDRATE 500 MG: 500 INJECTION, POWDER, LYOPHILIZED, FOR SOLUTION INTRAVENOUS at 15:37

## 2024-04-20 RX ADMIN — INSULIN GLARGINE 10 UNITS: 100 INJECTION, SOLUTION SUBCUTANEOUS at 19:57

## 2024-04-20 RX ADMIN — INSULIN LISPRO 4 UNITS: 100 INJECTION, SOLUTION INTRAVENOUS; SUBCUTANEOUS at 17:28

## 2024-04-20 RX ADMIN — CEFTRIAXONE SODIUM 1000 MG: 1 INJECTION, POWDER, FOR SOLUTION INTRAMUSCULAR; INTRAVENOUS at 13:26

## 2024-04-20 ASSESSMENT — PAIN DESCRIPTION - LOCATION
LOCATION: KNEE;BACK
LOCATION: BACK

## 2024-04-20 ASSESSMENT — PAIN DESCRIPTION - DESCRIPTORS: DESCRIPTORS: ACHING

## 2024-04-20 ASSESSMENT — PAIN SCALES - GENERAL
PAINLEVEL_OUTOF10: 9
PAINLEVEL_OUTOF10: 9
PAINLEVEL_OUTOF10: 4

## 2024-04-20 ASSESSMENT — PAIN DESCRIPTION - PAIN TYPE: TYPE: CHRONIC PAIN

## 2024-04-20 ASSESSMENT — PAIN DESCRIPTION - ORIENTATION: ORIENTATION: RIGHT;LEFT;LOWER;MID

## 2024-04-20 NOTE — CONSULTS
Penicillins Hives and Swelling    Codeine Hives and Rash       ROS: Constitutional: negative for chills, fevers and sweats  Eyes: negative for cataracts, icterus and redness  Ears, nose, mouth, throat, and face: negative for epistaxis, hearing loss and sore throat  Respiratory: negative for cough, hemoptysis and sputum  Cardiovascular: negative for chest pain, dyspnea and lower extremity edema  Gastrointestinal: as per HPI  Genitourinary:negative for dysuria, frequency and hematuria  Neurological: negative for coordination problems, dizziness and gait problems  Behavioral/Psych: negative for anxiety, depression and mood swings    Physical Exam   /60   Pulse 62   Temp 97.8 °F (36.6 °C) (Oral)   Resp 18   Ht 1.575 m (5' 2\")   Wt 64.5 kg (142 lb 4.8 oz)   SpO2 95%   BMI 26.03 kg/m²     General: alert, appears older than stated age, and cachectic  Head: Normocephalic, without obvious abnormality  Eyes:  no icterus  Neck: supple, symmetrical, trachea midline  Heart: regular rate and rhythm  Abdomen:  soft, NT, ND  Extremities: no edema    Lab and Imaging Review   Labs:  CBC:   Recent Labs     04/18/24  0430 04/18/24  1511 04/19/24  1203 04/19/24  1807 04/20/24  0500   WBC 7.2  --  9.6  --  7.9   HGB 5.8*   < > 8.1* 8.8* 7.9*   HCT 19.5*   < > 25.7* 28.0* 24.5*   MCV 92.8  --  88.9  --  90.0     --  248  --  221    < > = values in this interval not displayed.     BMP:   Recent Labs     04/18/24  0430 04/19/24  1203 04/20/24  0500    141 139   K 4.9 4.9  4.9 5.2*   * 113* 110   CO2 17* 19* 19*   BUN 42* 44* 42*   CREATININE 3.1* 2.9* 2.9*       Iron panel:  Lab Results   Component Value Date/Time    FERRITIN 344.3 04/17/2024 05:08 AM    IRON 107 04/17/2024 05:08 AM    [id number] 726 12/26/2023 07:09 AM    FOLATE 16.18 12/26/2023 07:09 AM     CT abd/pelvis:  Limited noncontrast examination of the abdomen and pelvis with no findings to explain provided history of anemia.     Moderate right  and small left pleural effusion    Assessment:     64 year old female with history of DM, HTN, HLD, CAD s/p stent, CKD, CVA, TIA, anxiety, depression, bipolar do, DDD, gout, wilm's tumor s/p R nephrectomy, breast cancer s/p radiation and lumpectomy, colon cancer s/p R colectomy (03/23) admitted with hyperosmolar hypergylcemic state. Acute on chronic anemia secondary to multifactorial causes including anemia of chronic disease, hemolysisGI bleed and malignancy.    Plan:   Continue supportive care  Monitor Hgb  Observe for signs of bleeding  PPI daily  Broad spectrum antibiotics for pneumonia  EGD and colonoscopy once cleared from medical standpoint  Carb control diet  Outpatient EUS for chronic elevated Alk Phos  Check GGT to r/o bone etiology    Juancarlos Davis MD, MD  11:02 AM 4/20/2024

## 2024-04-20 NOTE — PROGRESS NOTES
Ph: [phone], Fax: [phone]           [facility]               Reason for admission:                 Uncontrolled diabetes with hyperosmolar state    Brief Summary :     Agustina Fried is being seen by nephrology for SHARRON on CKD.  Initially admitted with uncontrolled diabetes and hyperosmolar state being admitted to ICU since got better in the ER itself  However course of hospitalization is complicated by variable blood pressure and SHARRON suspected to be ATN.  Also has significant drop of hemoglobin      Interval History and plan:     Seen at bedside  Blood pressure 112/60, overall stable over the last 24 hours  2 L urine +1 unmeasured occurrence yesterday  Creatinine unchanged at 2.9 today  Bicarb unchanged at 19 today  Potassium slightly elevated at 5.2  Got albumin 12.5 g twice daily yesterday  Amlodipine, imdur on hold due to softer BP     Plan:  Electrolytes overall stable  Renal function stable over the last 24 hours  Good urine output  No acute indications for dialysis at this time  Will give 1 dose of Bicitra 30 mL today to address acidosis  Should help with the hyperkalemia also.                   Assessment :        Acute Kidney Injury on CKD stage IIIa/  Creatinine 2.7 at the time of consult-down from peak of 2.9  Appears to be due to rapid lowering of blood pressure  Baseline creatinine 1-1.3  History of chronic diarrhea due to colectomy/recurrent SHARRON in the past  Has reduced renal mass-nephrectomy due to Wilms tumor in the past also CKD contributed by diabetes  Renal imaging-surgical absence of right kidney  Echo 9/21-no abnormality mentioned    Hypertension   BP: (112)/(60)  Pulse:  [62]   BP goal inpatient 130-140 systolic inpatient  Blood pressure as high as 200 systolic on presentation and went down to 112/51           [facility] would like to thank Ankur Douglas MD   for opportunity to serve this patient      Please call with questions at-   24 Hrs Answering  service [phone] or  7 am- 5 pm via Perfect serve or cell phone  Dr.Firas MARCIANO Evans MD       HPI :     Agustina Fried is a 64 y.o. female presented to   the hospital on 4/12/2024 with known diabetes CAD CKD presented with feeling unwell for several days.  Also very high reading on the glucometer.  Also treated with increased thirst and urination for couple of days.  She was brought to the emergency room where she was found to have very high glucose, was treated for hyperosmolar state and now the glucose is getting better but the creatinine is going up because of which we are consulted.      PMH/PSH/SH/Family History:     Past Medical History:   Diagnosis Date    Abnormal brain MRI 7/20/2017    Partially empty sella and minimal chronic small vessel ischemic disease    Acute bilateral low back pain without sciatica 11/2/2016    SHARRON (acute kidney injury) ([facility]) 7/5/2017    Arthritis     back    Bipolar disorder ([facility]) 10/18/2008    CAD (coronary artery disease)     stent placed 6/8/20    Cancer ([facility]) 2015    bilateral breast:s/p lumpectomy/radiation:under care care of breast specialist:Dr. Boone     Carotid stenosis, bilateral:<50%:per US 7/2016 7/15/2016    Carpal tunnel syndrome 10/18/2008    Cervical cancer screening 2014    Nml per pt'.    Coronary artery disease of native artery of native heart with stable angina pectoris ([facility]) 6/9/2020    DDD (degenerative disc disease), lumbar 7/18/2018    Depression     under care of pschiatrist:Dr. 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

## 2024-04-20 NOTE — PLAN OF CARE
Problem: Discharge Planning  Goal: Discharge to home or other facility with appropriate resources  Outcome: Progressing  Flowsheets (Taken 4/19/2024 2040)  Discharge to home or other facility with appropriate resources:   Identify barriers to discharge with patient and caregiver   Arrange for needed discharge resources and transportation as appropriate   Identify discharge learning needs (meds, wound care, etc)   Refer to discharge planning if patient needs post-hospital services based on physician order or complex needs related to functional status, cognitive ability or social support system     Problem: Pain  Goal: Verbalizes/displays adequate comfort level or baseline comfort level  Outcome: Progressing  Flowsheets (Taken 4/19/2024 2220)  Verbalizes/displays adequate comfort level or baseline comfort level:   Encourage patient to monitor pain and request assistance   Assess pain using appropriate pain scale   Administer analgesics based on type and severity of pain and evaluate response   Implement non-pharmacological measures as appropriate and evaluate response   Consider cultural and social influences on pain and pain management   Notify Licensed Independent Practitioner if interventions unsuccessful or patient reports new pain     Problem: Safety - Adult  Goal: Free from fall injury  Outcome: Progressing  Flowsheets (Taken 4/19/2024 2220)  Free From Fall Injury:   Based on caregiver fall risk screen, instruct family/caregiver to ask for assistance with transferring infant if caregiver noted to have fall risk factors   Instruct family/caregiver on patient safety     Problem: ABCDS Injury Assessment  Goal: Absence of physical injury  Outcome: Progressing  Flowsheets (Taken 4/19/2024 2220)  Absence of Physical Injury: Implement safety measures based on patient assessment     Problem: Skin/Tissue Integrity  Goal: Absence of new skin breakdown  Description: 1.  Monitor for areas of redness and/or skin  Patient is a 68y old  Female who presents with a chief complaint of SOB (23 Oct 2017 11:06)  doing ok  no SOB at rest  + cough    Any change in ROS:     MEDICATIONS  (STANDING):  ALBUTerol    90 MICROgram(s) HFA Inhaler 2 Puff(s) Inhalation every 6 hours  aspirin enteric coated 81 milliGRAM(s) Oral daily  atorvastatin 80 milliGRAM(s) Oral at bedtime  buDESOnide 160 MICROgram(s)/formoterol 4.5 MICROgram(s) Inhaler 2 Puff(s) Inhalation two times a day  calcium acetate 667 milliGRAM(s) Oral four times a day with meals  cefepime  IVPB 2000 milliGRAM(s) IV Intermittent every 24 hours  darbepoetin Injectable ViaL 100 MICROGram(s) SubCutaneous every 7 days  dextrose 5%. 1000 milliLiter(s) (50 mL/Hr) IV Continuous <Continuous>  dextrose 50% Injectable 12.5 Gram(s) IV Push once  dextrose 50% Injectable 25 Gram(s) IV Push once  dextrose 50% Injectable 25 Gram(s) IV Push once  docusate sodium 100 milliGRAM(s) Oral two times a day  ferrous    sulfate 325 milliGRAM(s) Oral daily  furosemide   Injectable 40 milliGRAM(s) IV Push two times a day  heparin  Injectable 5000 Unit(s) SubCutaneous every 8 hours  hydrALAZINE 25 milliGRAM(s) Oral two times a day  influenza   Vaccine 0.5 milliLiter(s) IntraMuscular once  insulin glargine Injectable (LANTUS) 15 Unit(s) SubCutaneous at bedtime  insulin lispro (HumaLOG) corrective regimen sliding scale   SubCutaneous Before meals and at bedtime  insulin lispro Injectable (HumaLOG) 5 Unit(s) SubCutaneous three times a day before meals  metoprolol 100 milliGRAM(s) Oral daily  metoprolol 50 milliGRAM(s) Oral at bedtime  Nephrocaps 1 Capsule(s) Oral daily  senna 2 Tablet(s) Oral at bedtime  sodium bicarbonate 1300 milliGRAM(s) Oral two times a day  tiotropium 18 MICROgram(s) Capsule 1 Capsule(s) Inhalation daily    MEDICATIONS  (PRN):  acetaminophen   Tablet. 650 milliGRAM(s) Oral every 6 hours PRN Moderate Pain (4 - 6)  AQUAPHOR (petrolatum Ointment) 1 Application(s) Topical three times a day PRN dry, itchy skin  benzonatate 100 milliGRAM(s) Oral every 6 hours PRN Cough  dextrose Gel 1 Dose(s) Oral once PRN Blood Glucose LESS THAN 70 milliGRAM(s)/deciliter  glucagon  Injectable 1 milliGRAM(s) IntraMuscular once PRN Glucose LESS THAN 70 milligrams/deciliter  guaiFENesin   Syrup  (Sugar-Free) 200 milliGRAM(s) Oral every 6 hours PRN Cough  HYDROcodone/homatropine Syrup 5 milliLiter(s) Oral four times a day PRN Cough  sodium chloride 0.65% Nasal 1 Spray(s) Both Nostrils two times a day PRN Dryness    Vital Signs Last 24 Hrs  T(C): 36.8 (31 Oct 2017 07:56), Max: 36.8 (30 Oct 2017 16:26)  T(F): 98.2 (31 Oct 2017 07:56), Max: 98.2 (30 Oct 2017 16:26)  HR: 57 (31 Oct 2017 07:56) (57 - 73)  BP: 119/71 (31 Oct 2017 07:56) (119/71 - 150/71)  BP(mean): --  RR: 19 (31 Oct 2017 07:56) (18 - 20)  SpO2: 100% (31 Oct 2017 07:56) (97% - 100%)    I&O's Summary    30 Oct 2017 07:01  -  31 Oct 2017 07:00  --------------------------------------------------------  IN: 1490 mL / OUT: 700 mL / NET: 790 mL          Physical Exam:   GENERAL: NAD, well-groomed, well-developed  HEENT: THIERRY/   Atraumatic, Normocephalic  ENMT: No tonsillar erythema, exudates, or enlargement; Moist mucous membranes, Good dentition, No lesions  NECK: Supple, No JVD, Normal thyroid  CHEST/LUNG: Crackles    CVS: Regular rate and rhythm; No murmurs, rubs, or gallops  GI: : Soft, Nontender, Nondistended; Bowel sounds present  NERVOUS SYSTEM:  Alert & Oriented X3  EXTREMITIES:  +edema  LYMPH: No lymphadenopathy noted  SKIN: No rashes or lesions  ENDOCRINOLOGY: No Thyromegaly  PSYCH: Appropriate    Labs:                              8.3    15.27 )-----------( 425      ( 31 Oct 2017 07:30 )             27.0                         8.0    15.15 )-----------( 429      ( 30 Oct 2017 07:40 )             26.3                         8.0    14.51 )-----------( 447      ( 29 Oct 2017 08:28 )             25.2                         8.3    14.71 )-----------( 464      ( 28 Oct 2017 08:24 )             26.9     10-31    137  |  93<L>  |  85<H>  ----------------------------<  128<H>  4.2   |  28  |  4.45<H>  10-30    136  |  95<L>  |  83<H>  ----------------------------<  130<H>  4.3   |  27  |  4.54<H>  10-29    138  |  98  |  82<H>  ----------------------------<  115<H>  4.4   |  24  |  4.50<H>  10-28    137  |  98  |  82<H>  ----------------------------<  130<H>  4.8   |  21<L>  |  4.36<H>    Ca    9.9      31 Oct 2017 07:42  Ca    10.1      30 Oct 2017 07:35    TPro  6.9  /  Alb  3.3  /  TBili  0.2  /  DBili  x   /  AST  32  /  ALT  32  /  AlkPhos  111  10-28    CAPILLARY BLOOD GLUCOSE      POCT Blood Glucose.: 169 mg/dL (31 Oct 2017 07:28)  POCT Blood Glucose.: 157 mg/dL (30 Oct 2017 21:03)  POCT Blood Glucose.: 162 mg/dL (30 Oct 2017 16:47)  POCT Blood Glucose.: 161 mg/dL (30 Oct 2017 11:15)            Cultures:       Blood Culture:           10-26 @ 16:33  Organism --  Gram stain   No polymorphonuclear cells seen per low power field  Rare Squamous epithelial cells per low power field  Few Gram positive cocci in pairs per oil power field  Few Gram Positive Rods per oil power field        Wound culture:                10-26 @ 16:33  Organism --  Culture w/ gram stain --  Specimen Source .Broncial Bronchoalveolar Washings      Abscess culture:             10-26 @ 16:33  Organism --  Gram Stain --  Specimen Source .Broncial Bronchoalveolar Washings      CSF:              10-26 @ 16:33  Organism --  Gram Stain   No polymorphonuclear cells seen per low power field  Rare Squamous epithelial cells per low power field  Few Gram positive cocci in pairs per oil power field  Few Gram Positive Rods per oil power field      Tissue culture:           10-26 @ 16:33  Organism --  Gram Stain   No polymorphonuclear cells seen per low power field  Rare Squamous epithelial cells per low power field  Few Gram positive cocci in pairs per oil power field  Few Gram Positive Rods per oil power field  Specimen Source .Broncial Bronchoalveolar Washings      Body Fluid Smear & Culture:                        10-26 @ 16:33  AFB Smear    No acid fast bacilli seen by fluorochrome stain  Culture Acid Fast Body Fluid w/ Smear  --  Culture Acid Fast Smear Concentrated   --    Culture Results:       Normal Respiratory Elyssa present  Specimen Source .Broncial Bronchoalveolar Washings              Studies  Chest X-RAY  CT SCAN Chest   Venous Dopplers: LE:   CT Abdomen  Others      < from: Xray Chest 1 View AP- PORTABLE-Urgent (10.26.17 @ 14:00) >  INTERPRETATION:  CLINICAL INDICATION: Shortness of breath    TECHNIQUE: Frontal view of the chest dated 10/26/2017    COMPARISON: X-Ray of the chest dated 10/23/2017    IMPRESSION:  Small bilateral pleural effusions. Increased lung markings in bilateral   lungs are predominantly unchanged. Degenerative changes of the thoracic   spine.    .                MUNDO UMANZOR M.D., RADIOLOGY RESIDENT  Thisdocument has been electronically signed.  SHRUTHI GONZALEZ M.D. ATTENDING RADIOLOGIST  This document has been electronically signed. Oct 27 2017 11:45AM    < end of copied text >

## 2024-04-20 NOTE — PLAN OF CARE
Problem: Discharge Planning  Goal: Discharge to home or other facility with appropriate resources  4/20/2024 0934 by Shraddha Stewart RN  Outcome: Progressing  Flowsheets (Taken 4/20/2024 0800)  Discharge to home or other facility with appropriate resources: Identify barriers to discharge with patient and caregiver  4/19/2024 2220 by Mary Jane Gilbert RN  Outcome: Progressing  Flowsheets (Taken 4/19/2024 2040)  Discharge to home or other facility with appropriate resources:   Identify barriers to discharge with patient and caregiver   Arrange for needed discharge resources and transportation as appropriate   Identify discharge learning needs (meds, wound care, etc)   Refer to discharge planning if patient needs post-hospital services based on physician order or complex needs related to functional status, cognitive ability or social support system     Problem: Pain  Goal: Verbalizes/displays adequate comfort level or baseline comfort level  4/20/2024 0934 by Shraddha Stewart RN  Outcome: Progressing  Flowsheets (Taken 4/20/2024 0800)  Verbalizes/displays adequate comfort level or baseline comfort level: Encourage patient to monitor pain and request assistance  4/19/2024 2220 by Mary Jane Gilbert RN  Outcome: Progressing  Flowsheets (Taken 4/19/2024 2220)  Verbalizes/displays adequate comfort level or baseline comfort level:   Encourage patient to monitor pain and request assistance   Assess pain using appropriate pain scale   Administer analgesics based on type and severity of pain and evaluate response   Implement non-pharmacological measures as appropriate and evaluate response   Consider cultural and social influences on pain and pain management   Notify Licensed Independent Practitioner if interventions unsuccessful or patient reports new pain     Problem: Safety - Adult  Goal: Free from fall injury  4/20/2024 0934 by Shraddha Stewart RN  Outcome: Progressing  4/19/2024 2220 by Mary Jane Gilbert RN  Outcome:  to use good hand hygiene technique     Problem: Hematologic - Adult  Goal: Maintains hematologic stability  Recent Flowsheet Documentation  Taken 4/20/2024 0800 by Shraddha Stewart, RN  Maintains hematologic stability: Assess for signs and symptoms of bleeding or hemorrhage  4/19/2024 2220 by Mary Jane Gilbert, RN  Outcome: Progressing  Flowsheets (Taken 4/19/2024 2040)  Maintains hematologic stability:   Assess for signs and symptoms of bleeding or hemorrhage   Monitor labs for bleeding or clotting disorders

## 2024-04-20 NOTE — PROGRESS NOTES
Hospital Medicine Progress Note      Date of Admission: 4/12/2024  Hospital Day: 9    Chief Admission Complaint:  elevated BS, not feeling well     Subjective:    In the bed ,  no cough , SOB or hypoxia   Denies urinary c/o  but over all weak   No melena , ab pain , N or V  Presenting Admission History:       Agustina Fried is a 64 y.o. female with pmh of type II DM, CAD, stage III CKD, who presents with not feeling well last several dose.  Her glucometer was reading high.  She gives history of excessive thirst and urination for last 2 days.  She also states her tongue started flailing and was not able to talk straight.  Per patient, she has been compliant with her insulin regimen at home.     When seen in the emergency room, she appears extremely dry.  She is not a very good historian.  Denies any abdominal pain, chest pain, dizziness or lightheadedness.     Workup in the emergency room revealed very high blood glucose of 1011 mg/DL, negative ketones and normal anion gap.    Assessment/Plan:      Current Principal Problem:  Hyperosmolar hyperglycemic state (HHS) ([facility])    HHS  Type II DM  Presented with blood glucose of 1011 mg/DL  No anion gap, Negative ketones in urine  Received regular insulin 10 units in the emergency room  Has been taking 8 units nightly insulin and states she has been taking 4 units Premeal.  Questionable compliance  Obtain hemoglobin A1c in the morning.  Plan was to initiate on insulin drip and admitted to ICU.  However on repeat blood sugar check in the ED, sugars improving and currently in 400s.    started on Lantus 15 units nightly ( home dose )and medium dose sliding scale.- [facility] ed as of hypoglycemia .  8 units lantus am , med SSI  Diabetic diet   BS reviewed - elevated   Started pm lantus  8 units   Used to take 15 unts at home   BS reviewed - in upper 100s or 200 s -       Translocational hyponatremia  Due to hyperglycemia  Sodium on presentation 121 mmol/liter  improved     Lactic  Collateral history obtained from:    [x] All available Consultant notes from yesterday/today were reviewed  [x] All current labs were reviewed and interpreted for clinical significance   [x] Appropriate follow-up labs were ordered    Medications:  Personally reviewed in detail in conjunction w/ labs as documented for evidence of drug toxicity.     Infusion Medications    sodium chloride      sodium chloride      dextrose       Scheduled Medications    insulin glargine  10 Units SubCUTAneous Nightly    [Held by provider] paliperidone  3 mg Oral QAM    insulin glargine  8 Units SubCUTAneous Daily    [Held by provider] amLODIPine  10 mg Oral Daily    atorvastatin  40 mg Oral Nightly    [Held by provider] isosorbide mononitrate  30 mg Oral Daily    pantoprazole  40 mg Oral Daily    [Held by provider] traZODone  300 mg Oral Nightly    insulin lispro  0-8 Units SubCUTAneous TID WC    insulin lispro  0-4 Units SubCUTAneous Nightly     PRN Meds: glucagon, sodium chloride, sodium chloride, acetaminophen, clonazePAM, glucose, dextrose bolus **OR** dextrose bolus, dextrose, oxyCODONE-acetaminophen, potassium chloride, magnesium sulfate, sodium phosphate 15 mmol in sodium chloride 0.9 % 250 mL IVPB, polyethylene glycol, [Held by provider] hydrALAZINE     Labs:  Personally reviewed and interpreted for clinical significance.     Recent Labs     04/18/24  0430 04/18/24  1511 04/19/24  1203 04/19/24  1807 04/20/24  0500   WBC 7.2  --  9.6  --  7.9   HGB 5.8*   < > 8.1* 8.8* 7.9*   HCT 19.5*   < > 25.7* 28.0* 24.5*     --  248  --  221    < > = values in this interval not displayed.       Recent Labs     04/18/24  0430 04/19/24  1203 04/20/24  0500    141 139   K 4.9 4.9  4.9 5.2*   * 113* 110   CO2 17* 19* 19*   BUN 42* 44* 42*   CREATININE 3.1* 2.9* 2.9*   CALCIUM 7.4* 7.9* 7.6*       No results for input(s): \"PROBNP\", \"TROPHS\" in the last 72 hours.  No results for input(s): \"LABA1C\" in the last 72 hours.    No

## 2024-04-21 LAB
ALBUMIN SERPL-MCNC: 1.7 G/DL (ref 3.4–5)
ALP SERPL-CCNC: 160 U/L (ref 40–129)
ALT SERPL-CCNC: 17 U/L (ref 10–40)
ANION GAP SERPL CALCULATED.3IONS-SCNC: 9 MMOL/L (ref 3–16)
AST SERPL-CCNC: 16 U/L (ref 15–37)
BACTERIA BLD CULT ORG #2: NORMAL
BACTERIA BLD CULT: NORMAL
BASOPHILS # BLD: 0 K/UL (ref 0–0.2)
BASOPHILS NFR BLD: 0.5 %
BILIRUB DIRECT SERPL-MCNC: <0.2 MG/DL (ref 0–0.3)
BILIRUB INDIRECT SERPL-MCNC: ABNORMAL MG/DL (ref 0–1)
BILIRUB SERPL-MCNC: <0.2 MG/DL (ref 0–1)
BUN SERPL-MCNC: 37 MG/DL (ref 7–20)
CALCIUM SERPL-MCNC: 7.6 MG/DL (ref 8.3–10.6)
CHLORIDE SERPL-SCNC: 109 MMOL/L (ref 99–110)
CO2 SERPL-SCNC: 21 MMOL/L (ref 21–32)
CREAT SERPL-MCNC: 2.8 MG/DL (ref 0.6–1.2)
DEPRECATED RDW RBC AUTO: 15.7 % (ref 12.4–15.4)
EOSINOPHIL # BLD: 0.1 K/UL (ref 0–0.6)
EOSINOPHIL NFR BLD: 1 %
GFR SERPLBLD CREATININE-BSD FMLA CKD-EPI: 18 ML/MIN/{1.73_M2}
GGT SERPL-CCNC: 135 U/L (ref 5–36)
GLUCOSE BLD-MCNC: 113 MG/DL (ref 70–99)
GLUCOSE BLD-MCNC: 159 MG/DL (ref 70–99)
GLUCOSE BLD-MCNC: 220 MG/DL (ref 70–99)
GLUCOSE BLD-MCNC: 311 MG/DL (ref 70–99)
GLUCOSE SERPL-MCNC: 111 MG/DL (ref 70–99)
HCT VFR BLD AUTO: 25.6 % (ref 36–48)
HCT VFR BLD AUTO: 29.7 % (ref 36–48)
HGB BLD-MCNC: 8.2 G/DL (ref 12–16)
HGB BLD-MCNC: 9 G/DL (ref 12–16)
LYMPHOCYTES # BLD: 2.3 K/UL (ref 1–5.1)
LYMPHOCYTES NFR BLD: 28.6 %
MCH RBC QN AUTO: 28.8 PG (ref 26–34)
MCHC RBC AUTO-ENTMCNC: 32.1 G/DL (ref 31–36)
MCV RBC AUTO: 89.7 FL (ref 80–100)
MONOCYTES # BLD: 0.7 K/UL (ref 0–1.3)
MONOCYTES NFR BLD: 8.5 %
NEUTROPHILS # BLD: 5 K/UL (ref 1.7–7.7)
NEUTROPHILS NFR BLD: 61.4 %
PERFORMED ON: ABNORMAL
PLATELET # BLD AUTO: 238 K/UL (ref 135–450)
PMV BLD AUTO: 10 FL (ref 5–10.5)
POTASSIUM SERPL-SCNC: 5.1 MMOL/L (ref 3.5–5.1)
PROT SERPL-MCNC: 5.9 G/DL (ref 6.4–8.2)
RBC # BLD AUTO: 2.85 M/UL (ref 4–5.2)
SODIUM SERPL-SCNC: 139 MMOL/L (ref 136–145)
WBC # BLD AUTO: 8.2 K/UL (ref 4–11)

## 2024-04-21 PROCEDURE — 85014 HEMATOCRIT: CPT

## 2024-04-21 PROCEDURE — 2060000000 HC ICU INTERMEDIATE R&B

## 2024-04-21 PROCEDURE — 36415 COLL VENOUS BLD VENIPUNCTURE: CPT

## 2024-04-21 PROCEDURE — 82977 ASSAY OF GGT: CPT

## 2024-04-21 PROCEDURE — 80076 HEPATIC FUNCTION PANEL: CPT

## 2024-04-21 PROCEDURE — 80048 BASIC METABOLIC PNL TOTAL CA: CPT

## 2024-04-21 PROCEDURE — 6370000000 HC RX 637 (ALT 250 FOR IP): Performed by: INTERNAL MEDICINE

## 2024-04-21 PROCEDURE — 6360000002 HC RX W HCPCS: Performed by: INTERNAL MEDICINE

## 2024-04-21 PROCEDURE — 85025 COMPLETE CBC W/AUTO DIFF WBC: CPT

## 2024-04-21 PROCEDURE — 85018 HEMOGLOBIN: CPT

## 2024-04-21 PROCEDURE — 2580000003 HC RX 258: Performed by: INTERNAL MEDICINE

## 2024-04-21 RX ADMIN — OXYCODONE AND ACETAMINOPHEN 1 TABLET: 7.5; 325 TABLET ORAL at 17:21

## 2024-04-21 RX ADMIN — ATORVASTATIN CALCIUM 40 MG: 40 TABLET, FILM COATED ORAL at 20:40

## 2024-04-21 RX ADMIN — AZITHROMYCIN MONOHYDRATE 500 MG: 500 INJECTION, POWDER, LYOPHILIZED, FOR SOLUTION INTRAVENOUS at 16:22

## 2024-04-21 RX ADMIN — PANTOPRAZOLE SODIUM 40 MG: 40 TABLET, DELAYED RELEASE ORAL at 09:40

## 2024-04-21 RX ADMIN — INSULIN LISPRO 2 UNITS: 100 INJECTION, SOLUTION INTRAVENOUS; SUBCUTANEOUS at 17:19

## 2024-04-21 RX ADMIN — INSULIN GLARGINE 10 UNITS: 100 INJECTION, SOLUTION SUBCUTANEOUS at 20:40

## 2024-04-21 RX ADMIN — INSULIN LISPRO 4 UNITS: 100 INJECTION, SOLUTION INTRAVENOUS; SUBCUTANEOUS at 20:39

## 2024-04-21 RX ADMIN — CEFTRIAXONE SODIUM 1000 MG: 1 INJECTION, POWDER, FOR SOLUTION INTRAMUSCULAR; INTRAVENOUS at 14:38

## 2024-04-21 RX ADMIN — INSULIN GLARGINE 8 UNITS: 100 INJECTION, SOLUTION SUBCUTANEOUS at 09:40

## 2024-04-21 NOTE — PLAN OF CARE
Problem: Discharge Planning  Goal: Discharge to home or other facility with appropriate resources  Outcome: Progressing  Flowsheets (Taken 4/21/2024 0800)  Discharge to home or other facility with appropriate resources: Identify barriers to discharge with patient and caregiver     Problem: Pain  Goal: Verbalizes/displays adequate comfort level or baseline comfort level  Outcome: Progressing  Flowsheets (Taken 4/21/2024 0745)  Verbalizes/displays adequate comfort level or baseline comfort level: Encourage patient to monitor pain and request assistance     Problem: Safety - Adult  Goal: Free from fall injury  Outcome: Progressing     Problem: ABCDS Injury Assessment  Goal: Absence of physical injury  Outcome: Progressing     Problem: Skin/Tissue Integrity  Goal: Absence of new skin breakdown  Description: 1.  Monitor for areas of redness and/or skin breakdown  2.  Assess vascular access sites hourly  3.  Every 4-6 hours minimum:  Change oxygen saturation probe site  4.  Every 4-6 hours:  If on nasal continuous positive airway pressure, respiratory therapy assess nares and determine need for appliance change or resting period.  Outcome: Progressing     Problem: Chronic Conditions and Co-morbidities  Goal: Patient's chronic conditions and co-morbidity symptoms are monitored and maintained or improved  Outcome: Progressing  Flowsheets (Taken 4/21/2024 0800)  Care Plan - Patient's Chronic Conditions and Co-Morbidity Symptoms are Monitored and Maintained or Improved: Monitor and assess patient's chronic conditions and comorbid symptoms for stability, deterioration, or improvement     Problem: Cardiovascular - Adult  Goal: Absence of cardiac dysrhythmias or at baseline  Outcome: Progressing  Flowsheets (Taken 4/21/2024 0800)  Absence of cardiac dysrhythmias or at baseline: Monitor cardiac rate and rhythm  Goal: Maintains optimal cardiac output and hemodynamic stability  Outcome: Progressing  Flowsheets (Taken 4/21/2024

## 2024-04-21 NOTE — PLAN OF CARE
Problem: Discharge Planning  Goal: Discharge to home or other facility with appropriate resources  4/20/2024 2058 by Mary Jane Gilbert RN  Outcome: Progressing  Flowsheets (Taken 4/20/2024 2058)  Discharge to home or other facility with appropriate resources:   Identify barriers to discharge with patient and caregiver   Arrange for needed discharge resources and transportation as appropriate   Identify discharge learning needs (meds, wound care, etc)   Refer to discharge planning if patient needs post-hospital services based on physician order or complex needs related to functional status, cognitive ability or social support system    Problem: Pain  Goal: Verbalizes/displays adequate comfort level or baseline comfort level  4/20/2024 2058 by Mary Jane Gilbert RN  Outcome: Progressing  Flowsheets  Taken 4/20/2024 1941 by Mary Jane Gilbert RN  Verbalizes/displays adequate comfort level or baseline comfort level:   Encourage patient to monitor pain and request assistance   Assess pain using appropriate pain scale   Administer analgesics based on type and severity of pain and evaluate response   Implement non-pharmacological measures as appropriate and evaluate response   Consider cultural and social influences on pain and pain management   Notify Licensed Independent Practitioner if interventions unsuccessful or patient reports new pain     Problem: Safety - Adult  Goal: Free from fall injury  4/20/2024 2058 by Mary Jane Gilbert RN  Outcome: Progressing  Flowsheets (Taken 4/20/2024 2058)  Free From Fall Injury:   Based on caregiver fall risk screen, instruct family/caregiver to ask for assistance with transferring infant if caregiver noted to have fall risk factors   Instruct family/caregiver on patient safety     Problem: ABCDS Injury Assessment  Goal: Absence of physical injury  4/20/2024 2058 by Mary Jane Gilbert RN  Outcome: Progressing  Flowsheets (Taken 4/20/2024 2058)  Absence of Physical Injury: Implement safety measures based on patient

## 2024-04-21 NOTE — PROGRESS NOTES
Ph: [phone], Fax: [phone]           [facility]               Reason for admission:                 Uncontrolled diabetes with hyperosmolar state    Brief Summary :     Agustina Fried is being seen by nephrology for SHARRON on CKD.  Initially admitted with uncontrolled diabetes and hyperosmolar state being admitted to ICU since got better in the ER itself  However course of hospitalization is complicated by variable blood pressure and SHARRON suspected to be ATN.  Also has significant drop of hemoglobin      Interval History and plan:     Seen at bedside, sound asleep  Blood pressure 124/66, BP stronger since holding antihypertensives.  8 unmeasured urine occurrences yesterday  Creatinine unchanged at 2.9 today  Bicarb improved ot 21 today after 30 mL bicitra yesterday  Potassium 5.1, stable.  Hb stable.  Amlodipine, imdur on hold due to softer BP     Plan:  Electrolytes overall stable  Renal function stable over the last 24 hours  Good urine output  No acute indications for dialysis at this time  Sustained ATN. Renal function might take a few days to begin improving. Has excellent urine output and is managing electrolytes well.                   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

## 2024-04-21 NOTE — PROGRESS NOTES
Hospital Medicine Progress Note      Date of Admission: 4/12/2024  Hospital Day: 10    Chief Admission Complaint:  elevated BS, not feeling well     Subjective:    In the bed ,  no cough , SOB or hypoxia   Denies urinary c/o  but over all weak   No melena , ab pain , N or V  Has N last gnuyen   Presenting Admission History:       Agustina Fried is a 64 y.o. female with pmh of type II DM, CAD, stage III CKD, who presents with not feeling well last several dose.  Her glucometer was reading high.  She gives history of excessive thirst and urination for last 2 days.  She also states her tongue started flailing and was not able to talk straight.  Per patient, she has been compliant with her insulin regimen at home.     When seen in the emergency room, she appears extremely dry.  She is not a very good historian.  Denies any abdominal pain, chest pain, dizziness or lightheadedness.     Workup in the emergency room revealed very high blood glucose of 1011 mg/DL, negative ketones and normal anion gap.    Assessment/Plan:      Current Principal Problem:  Hyperosmolar hyperglycemic state (HHS) ([facility])    HHS  Type II DM  Presented with blood glucose of 1011 mg/DL  No anion gap, Negative ketones in urine  Received regular insulin 10 units in the emergency room  Has been taking 8 units nightly insulin and states she has been taking 4 units Premeal.  Questionable compliance  Obtain hemoglobin A1c in the morning.  Plan was to initiate on insulin drip and admitted to ICU.  However on repeat blood sugar check in the ED, sugars improving and currently in 400s.    started on Lantus 15 units nightly ( home dose )and medium dose sliding scale.- [place] ed as of hypoglycemia .  8 units lantus am , med SSI  Diabetic diet   BS reviewed - elevated   Started pm lantus  8 units   Used to take 15 unts at home   BS reviewed -  better controlled       Translocational hyponatremia  Due to hyperglycemia  Sodium on presentation 121 mmol/liter  improved

## 2024-04-21 NOTE — PROGRESS NOTES
PROGRESS NOTE    HPI: Agustina Fried is a(n)64 y.o. female admitted for work-up and treatment for Hyperglycemia [R73.9]  Hyperosmolar hyperglycemic state (HHS) (HCC) [E11.00]  Nausea and vomiting, unspecified vomiting type [R11.2].     We are following for anemia.    Subjective:     She has no GI complaints today.      Objective:     I/O last 3 completed shifts:  In: 2150 [P.O.:2100; IV Piggyback:50]  Out: 1200 [Urine:1200]      /66   Pulse 60   Temp 97.5 °F (36.4 °C) (Oral)   Resp 18   Ht 1.575 m (5' 2\")   Wt 64 kg (141 lb 1.6 oz)   SpO2 96%   BMI 25.81 kg/m²     Physical Exam:  HEENT: anicteric sclera, oropharyngeal membranes pink and moist.  Cor: RRR  Lungs: non-labored, no respiratory distress  Abdomen: soft,  NT. No ascites.  No hepatomegaly or splenomegaly  Extremities: no edema  Neuro: alert and oriented x 3      Results:   Lab Results   Component Value Date    ALT 17 04/21/2024    AST 16 04/21/2024     (H) 11/27/2023    ALKPHOS 160 (H) 04/21/2024    BILIDIR <0.2 04/21/2024    PROT 5.9 (L) 04/21/2024    INR 0.99 09/05/2023    LIPASE 4.0 (L) 04/12/2024     Lab Results   Component Value Date    WBC 8.2 04/21/2024    HGB 8.2 (L) 04/21/2024    HCT 25.6 (L) 04/21/2024    MCV 89.7 04/21/2024     04/21/2024     BUN/Cr/glu/ALT/AST/amyl/lip:  37/2.8/--/17/16/--/-- (04/21 0429)  CT ABDOMEN PELVIS WO CONTRAST Additional Contrast? None    Result Date: 4/19/2024  Limited noncontrast examination of the abdomen and pelvis with no findings to explain provided history of anemia. Moderate right and small left pleural effusions.     US RENAL COMPLETE    Result Date: 4/18/2024  1. No evidence of hydronephrosis.     XR CHEST PORTABLE    Result Date: 4/17/2024  New right basilar opacity.  Atelectasis is favored over pneumonia given the appearance.     US RENAL COMPLETE    Result Date: 4/14/2024  Status post right nephrectomy.  The left kidney is  unremarkable.         Impression:  62 year old female with past medical history of DM, CKD, HTN, Wilm's tumor s/p right nephrectomy, breast cancer 2014 s/p radiation and lumpectomy, CAD on Brilinta/ASA, depression, anxiety, chronic anemia and invasive adenocarcinoma of the right colon s/p rt colectomy 3/2023 and C. Diff 11/2023 admitted with hyperosmolar hypergylcemic state.     Normocytic anemia.  5 g drop in Hgb requiring transfusion on 4/18.  No overt signs of GI bleeding.  Stool heme negative.  CTap without source.  Suspect multifactorial - chronic disease.  Iron studies WNL.    2. Pneumonia.    3. Acute on chronic kidney disease    4. Elevated alk phos.   Double ductal sign and pancreatic calcifications on recent US.  M2Ab normal, RHONDA negative, GGT elevated, alk phos isoenzymes consistent with hepatic source.     5. HHS. Initially on insulin drip, now off.    Plan:  Monitor CBC daily.   PPI daily/  EGD and colonoscopy once cleared from medical standpoint.  Needs outpatient EUS.    Please do not hesitate to call with questions or concerns.      Electronically signed by: SARTHAK Bruce 4/21/2024 1:39 PM     (Office) [phone]  (Fax) [phone]  Available via perfect serve

## 2024-04-22 LAB
ANION GAP SERPL CALCULATED.3IONS-SCNC: 8 MMOL/L (ref 3–16)
BUN SERPL-MCNC: 29 MG/DL (ref 7–20)
CALCIUM SERPL-MCNC: 7.7 MG/DL (ref 8.3–10.6)
CHLORIDE SERPL-SCNC: 107 MMOL/L (ref 99–110)
CO2 SERPL-SCNC: 22 MMOL/L (ref 21–32)
CREAT SERPL-MCNC: 2.4 MG/DL (ref 0.6–1.2)
GFR SERPLBLD CREATININE-BSD FMLA CKD-EPI: 22 ML/MIN/{1.73_M2}
GLUCOSE BLD-MCNC: 109 MG/DL (ref 70–99)
GLUCOSE BLD-MCNC: 128 MG/DL (ref 70–99)
GLUCOSE BLD-MCNC: 276 MG/DL (ref 70–99)
GLUCOSE BLD-MCNC: 278 MG/DL (ref 70–99)
GLUCOSE SERPL-MCNC: 296 MG/DL (ref 70–99)
HCT VFR BLD AUTO: 27.1 % (ref 36–48)
HGB BLD-MCNC: 8.4 G/DL (ref 12–16)
PERFORMED ON: ABNORMAL
POTASSIUM SERPL-SCNC: 5.8 MMOL/L (ref 3.5–5.1)
SODIUM SERPL-SCNC: 137 MMOL/L (ref 136–145)

## 2024-04-22 PROCEDURE — 2060000000 HC ICU INTERMEDIATE R&B

## 2024-04-22 PROCEDURE — 6370000000 HC RX 637 (ALT 250 FOR IP): Performed by: INTERNAL MEDICINE

## 2024-04-22 PROCEDURE — 6360000002 HC RX W HCPCS: Performed by: INTERNAL MEDICINE

## 2024-04-22 PROCEDURE — 85018 HEMOGLOBIN: CPT

## 2024-04-22 PROCEDURE — 97110 THERAPEUTIC EXERCISES: CPT

## 2024-04-22 PROCEDURE — 2580000003 HC RX 258: Performed by: INTERNAL MEDICINE

## 2024-04-22 PROCEDURE — 97116 GAIT TRAINING THERAPY: CPT

## 2024-04-22 PROCEDURE — 85014 HEMATOCRIT: CPT

## 2024-04-22 PROCEDURE — 36415 COLL VENOUS BLD VENIPUNCTURE: CPT

## 2024-04-22 PROCEDURE — 80048 BASIC METABOLIC PNL TOTAL CA: CPT

## 2024-04-22 PROCEDURE — 1200000000 HC SEMI PRIVATE

## 2024-04-22 PROCEDURE — 97535 SELF CARE MNGMENT TRAINING: CPT

## 2024-04-22 RX ORDER — AMLODIPINE BESYLATE 5 MG/1
5 TABLET ORAL DAILY
Qty: 30 TABLET | Refills: 3 | Status: SHIPPED | OUTPATIENT
Start: 2024-04-23

## 2024-04-22 RX ORDER — INSULIN GLARGINE 100 [IU]/ML
10 INJECTION, SOLUTION SUBCUTANEOUS NIGHTLY
Qty: 10 ML | Refills: 0
Start: 2024-04-22 | End: 2024-04-25 | Stop reason: HOSPADM

## 2024-04-22 RX ORDER — INSULIN GLARGINE 100 [IU]/ML
8 INJECTION, SOLUTION SUBCUTANEOUS DAILY
Qty: 10 ML | Refills: 0
Start: 2024-04-23

## 2024-04-22 RX ORDER — TRAZODONE HYDROCHLORIDE 100 MG/1
100 TABLET ORAL NIGHTLY
Qty: 14 TABLET | Refills: 0
Start: 2024-04-22

## 2024-04-22 RX ORDER — AMLODIPINE BESYLATE 5 MG/1
5 TABLET ORAL DAILY
Status: DISCONTINUED | OUTPATIENT
Start: 2024-04-22 | End: 2024-04-23

## 2024-04-22 RX ORDER — CEFUROXIME AXETIL 250 MG/1
250 TABLET ORAL 2 TIMES DAILY
Qty: 4 TABLET | Refills: 0 | Status: SHIPPED | OUTPATIENT
Start: 2024-04-22 | End: 2024-04-25 | Stop reason: HOSPADM

## 2024-04-22 RX ADMIN — OXYCODONE AND ACETAMINOPHEN 1 TABLET: 7.5; 325 TABLET ORAL at 15:54

## 2024-04-22 RX ADMIN — OXYCODONE AND ACETAMINOPHEN 1 TABLET: 7.5; 325 TABLET ORAL at 23:23

## 2024-04-22 RX ADMIN — CEFTRIAXONE SODIUM 1000 MG: 1 INJECTION, POWDER, FOR SOLUTION INTRAMUSCULAR; INTRAVENOUS at 14:53

## 2024-04-22 RX ADMIN — SODIUM ZIRCONIUM CYCLOSILICATE 10 G: 10 POWDER, FOR SUSPENSION ORAL at 20:59

## 2024-04-22 RX ADMIN — AMLODIPINE BESYLATE 5 MG: 5 TABLET ORAL at 10:47

## 2024-04-22 RX ADMIN — AZITHROMYCIN MONOHYDRATE 500 MG: 500 INJECTION, POWDER, LYOPHILIZED, FOR SOLUTION INTRAVENOUS at 15:48

## 2024-04-22 RX ADMIN — PANTOPRAZOLE SODIUM 40 MG: 40 TABLET, DELAYED RELEASE ORAL at 08:24

## 2024-04-22 RX ADMIN — ATORVASTATIN CALCIUM 40 MG: 40 TABLET, FILM COATED ORAL at 20:59

## 2024-04-22 RX ADMIN — INSULIN GLARGINE 8 UNITS: 100 INJECTION, SOLUTION SUBCUTANEOUS at 08:45

## 2024-04-22 RX ADMIN — INSULIN GLARGINE 10 UNITS: 100 INJECTION, SOLUTION SUBCUTANEOUS at 20:59

## 2024-04-22 RX ADMIN — INSULIN LISPRO 4 UNITS: 100 INJECTION, SOLUTION INTRAVENOUS; SUBCUTANEOUS at 17:46

## 2024-04-22 ASSESSMENT — PAIN DESCRIPTION - LOCATION
LOCATION: BACK
LOCATION: GENERALIZED
LOCATION: BACK

## 2024-04-22 ASSESSMENT — PAIN SCALES - GENERAL
PAINLEVEL_OUTOF10: 8
PAINLEVEL_OUTOF10: 1
PAINLEVEL_OUTOF10: 6
PAINLEVEL_OUTOF10: 9
PAINLEVEL_OUTOF10: 5
PAINLEVEL_OUTOF10: 5
PAINLEVEL_OUTOF10: 6

## 2024-04-22 ASSESSMENT — PAIN SCALES - WONG BAKER
WONGBAKER_NUMERICALRESPONSE: HURTS A LITTLE BIT
WONGBAKER_NUMERICALRESPONSE: NO HURT

## 2024-04-22 ASSESSMENT — PAIN DESCRIPTION - DESCRIPTORS
DESCRIPTORS: ACHING

## 2024-04-22 ASSESSMENT — PAIN DESCRIPTION - ORIENTATION: ORIENTATION: MID

## 2024-04-22 NOTE — PROGRESS NOTES
PROGRESS NOTE    HPI: Agustina Fried is a(n)64 y.o. female admitted for work-up and treatment for Hyperglycemia [R73.9]  Hyperosmolar hyperglycemic state (HHS) (HCC) [E11.00]  Nausea and vomiting, unspecified vomiting type [R11.2].     We are following for anemia.    Subjective:     Says she had a bad night.   Does not think people are listening to her.  Refused morning labs.       Objective:     I/O last 3 completed shifts:  In: 1710 [P.O.:1660; IV Piggyback:50]  Out: -       BP (!) 148/75   Pulse 86   Temp 98.2 °F (36.8 °C) (Oral)   Resp 18   Ht 1.575 m (5' 2\")   Wt 64 kg (141 lb 1.6 oz)   SpO2 100%   BMI 25.81 kg/m²     Physical Exam:  HEENT: anicteric sclera, oropharyngeal membranes pink and moist.  Cor: RRR  Lungs: non-labored, no respiratory distress  Abdomen: soft,  NT. No ascites.  No hepatomegaly or splenomegaly  Extremities: no edema  Neuro: alert and oriented x 3      Results:   Lab Results   Component Value Date    ALT 17 04/21/2024    AST 16 04/21/2024     (H) 04/21/2024    ALKPHOS 160 (H) 04/21/2024    BILIDIR <0.2 04/21/2024    PROT 5.9 (L) 04/21/2024    INR 0.99 09/05/2023    LIPASE 4.0 (L) 04/12/2024     Lab Results   Component Value Date    WBC 8.2 04/21/2024    HGB 9.0 (L) 04/21/2024    HCT 29.7 (L) 04/21/2024    MCV 89.7 04/21/2024     04/21/2024     BUN/Cr/glu/ALT/AST/amyl/lip:  37/2.8/--/17/16/--/-- (04/21 0429)  CT ABDOMEN PELVIS WO CONTRAST Additional Contrast? None    Result Date: 4/19/2024  Limited noncontrast examination of the abdomen and pelvis with no findings to explain provided history of anemia. Moderate right and small left pleural effusions.     US RENAL COMPLETE    Result Date: 4/18/2024  1. No evidence of hydronephrosis.     XR CHEST PORTABLE    Result Date: 4/17/2024  New right basilar opacity.  Atelectasis is favored over pneumonia given the appearance.     US RENAL COMPLETE    Result Date:

## 2024-04-22 NOTE — PLAN OF CARE
Problem: Discharge Planning  Goal: Discharge to home or other facility with appropriate resources  4/21/2024 2044 by Felipe Cooper RN  Outcome: Progressing  4/21/2024 1141 by Shraddha Stewart RN  Outcome: Progressing  Flowsheets (Taken 4/21/2024 0800)  Discharge to home or other facility with appropriate resources: Identify barriers to discharge with patient and caregiver     Problem: Pain  Goal: Verbalizes/displays adequate comfort level or baseline comfort level  4/21/2024 2044 by Felipe Cooper RN  Outcome: Progressing  Flowsheets  Taken 4/21/2024 1615 by Shraddha Stewart RN  Verbalizes/displays adequate comfort level or baseline comfort level: Encourage patient to monitor pain and request assistance  Taken 4/21/2024 1200 by Shraddha Stewart RN  Verbalizes/displays adequate comfort level or baseline comfort level: Encourage patient to monitor pain and request assistance  4/21/2024 1141 by Shraddha Stewart RN  Outcome: Progressing  Flowsheets (Taken 4/21/2024 0745)  Verbalizes/displays adequate comfort level or baseline comfort level: Encourage patient to monitor pain and request assistance     Problem: Safety - Adult  Goal: Free from fall injury  4/21/2024 2044 by Felipe Cooper RN  Outcome: Progressing  4/21/2024 1141 by Shraddha Stewart RN  Outcome: Progressing     Problem: ABCDS Injury Assessment  Goal: Absence of physical injury  4/21/2024 2044 by Felipe Cooper RN  Outcome: Progressing  4/21/2024 1141 by Shraddha Stewart RN  Outcome: Progressing     Problem: Skin/Tissue Integrity  Goal: Absence of new skin breakdown  Description: 1.  Monitor for areas of redness and/or skin breakdown  2.  Assess vascular access sites hourly  3.  Every 4-6 hours minimum:  Change oxygen saturation probe site  4.  Every 4-6 hours:  If on nasal continuous positive airway pressure, respiratory therapy assess nares and determine need for appliance change or resting period.  4/21/2024 2044 by  Electrolytes maintained within normal limits  4/21/2024 2044 by Felipe Cooper RN  Outcome: Progressing  4/21/2024 1141 by Shraddha Stewart RN  Outcome: Progressing  Flowsheets (Taken 4/21/2024 0800)  Electrolytes maintained within normal limits: Monitor labs and assess patient for signs and symptoms of electrolyte imbalances  Goal: Glucose maintained within prescribed range  Outcome: Progressing  Flowsheets (Taken 4/21/2024 0800 by Shraddha Stewart RN)  Glucose maintained within prescribed range: Monitor blood glucose as ordered     Problem: Neurosensory - Adult  Goal: Achieves stable or improved neurological status  Outcome: Progressing     Problem: Respiratory - Adult  Goal: Achieves optimal ventilation and oxygenation  Outcome: Progressing     Problem: Skin/Tissue Integrity - Adult  Goal: Incisions, wounds, or drain sites healing without S/S of infection  Outcome: Progressing  Flowsheets (Taken 4/21/2024 0800 by Shraddha Stewart RN)  Incisions, Wounds, or Drain Sites Healing Without Sign and Symptoms of Infection: ADMISSION and DAILY: Assess and document risk factors for pressure ulcer development     Problem: Gastrointestinal - Adult  Goal: Maintains or returns to baseline bowel function  Outcome: Progressing     Problem: Genitourinary - Adult  Goal: Absence of urinary retention  Outcome: Progressing     Problem: Infection - Adult  Goal: Absence of infection at discharge  Outcome: Progressing  Flowsheets (Taken 4/21/2024 0800 by Shraddha Stewart RN)  Absence of infection at discharge:   Assess and monitor for signs and symptoms of infection   Monitor lab/diagnostic results     Problem: Hematologic - Adult  Goal: Maintains hematologic stability  Outcome: Progressing  Flowsheets (Taken 4/21/2024 0800 by Shraddha Stewart RN)  Maintains hematologic stability: Assess for signs and symptoms of bleeding or hemorrhage

## 2024-04-22 NOTE — PROGRESS NOTES
Physical Therapy  Facility/Department: [facility] C4 PCU  Daily Treatment Note  NAME: Agustina Fried  : 1960  MRN: [id number]    Date of Service: 2024    Discharge Recommendations:  Subacute/Skilled Nursing Facility   PT Equipment Recommendations  Equipment Needed: No  Other: defer    Therapy discharge recommendations are subject to collaboration from the patient’s interdisciplinary healthcare team, including MD and case management recommendations.    Barriers to Home Discharge:   [x] Steps to access home entry or bed/bath:   [x] Unable to transfer, ambulate, or propel wheelchair household distances without assist   [x] Limited available assist at home upon discharge    [] Patient or family requests d/c to post-acute facility    [x] Poor cognition/safety awareness for d/c to home alone    [] Unable to maintain ordered weight bearing status    [] Patient with salient signs of long-standing immobility   [] Decreased independence with ADLs   [x] Increased risk for falls   [] Other:    If pt is unable to be seen after this session, please let this note serve as discharge summary.  Please see case management note for discharge disposition.  Thank you.    Patient Diagnosis(es): The primary encounter diagnosis was Hyperglycemia. A diagnosis of Nausea and vomiting, unspecified vomiting type was also pertinent to this visit.    Assessment   Assessment: Pt reports fatigue, but agreeable to PT tx with encouragement. Pt performed bed mobility without assist, but required CGA with RW for mobility within room due to poor safety awareness. Pt performed BLE exercises in chair with encouragement. Pt states \"I don't know how I'm going to do all those stairs at home\". Offered stair training, but pt adamently declined. Continue to recommend SNF upon d/c, although if pt continues to decline recommend home with 24hr supervision and HHPT.  Activity Tolerance: Patient limited by endurance;Patient limited by fatigue  Equipment Needed:  No  Other: defer     Plan    Physical Therapy Plan  General Plan: 3-5 times per week  Current Treatment Recommendations: Strengthening;Balance training;Functional mobility training;Transfer training;Wheelchair mobility training;Stair training;Neuromuscular re-education;Endurance training;Home exercise program;Cognitive reorientation;Pain management;Safety education & training;Therapeutic activities     Restrictions  Restrictions/Precautions  Restrictions/Precautions: Up as Tolerated, Fall Risk  Required Braces or Orthoses?: No  Position Activity Restriction  Other position/activity restrictions: Tele, IV     Subjective    Subjective  Subjective: Pt R sidelying upon arrival, agreeable to PT tx with encouragement  Pain: denies pain     Objective   Vitals  Pulse: 62  Heart Rate Source: Monitor  BP: 129/71  BP Location: Left upper arm  BP Method: Automatic  MAP (Calculated): 90  SpO2: 100 %  O2 Device: None (Room air)    Bed Mobility Training  Bed Mobility Training: Yes  Supine to Sit: Supervision;Adaptive equipment;Additional time  Balance  Sitting: Intact  Standing: Impaired (grossly CGA with RW)  Transfer Training  Transfer Training: Yes  Interventions: Verbal cues;Safety awareness training  Sit to Stand: Contact-guard assistance;Adaptive equipment;Additional time  Stand to Sit: Adaptive equipment;Additional time;Contact-guard assistance (poor safety awareness; pt sitting prior to completely turning in from of  chair)  Gait  Gait Training: Yes  Overall Level of Assistance: Contact-guard assistance  Distance (ft): 20 Feet (declines further ambulation)  Assistive Device: Gait belt;Walker, rolling  Interventions: Safety awareness training;Verbal cues  Base of Support: Narrowed  Speed/Olga: Slow  Gait Abnormalities: Decreased step clearance  Number of Stairs Trained:  (Pt declined stair training this date)    PT Exercises  Exercise Treatment: Pt performed seated ankle pumps, LAQ, marches, and repeated STS Bx10 reps.

## 2024-04-22 NOTE — CARE COORDINATION
D/C order noted. Now patient states she wants to go to SNF and she has adamantly refused daily until now. Son agrees she needs to go to a facility and she has been to [facility] in past. Son states it is up to his mother and patient told writer she needed to go and wanted to go back to [facility]. Referral initiated to [facility].Chriss Anna in admissions they have had her before and they will start pre-cert as soon as OT sees her again.

## 2024-04-22 NOTE — PROGRESS NOTES
Occupational Therapy  Facility/Department: [facility] C4 PCU  Daily Treatment Note  NAME: Agustina Fried  : 1960  MRN: [id number]    Date of Service: 2024    Discharge Recommendations:  Subacute/Skilled Nursing Facility  OT Equipment Recommendations  Equipment Needed: No  Other: defer    Therapy discharge recommendations are subject to collaboration from the patient’s interdisciplinary healthcare team, including MD and case management recommendations.     Barriers to Home Discharge:   [x] Steps to access home entry or bed/bath:   [x] Unable to transfer, ambulate, or propel wheelchair household distances without assist   [x] Limited available assist at home upon discharge    [] Patient or family requests d/c to post-acute facility    [x] Poor cognition/safety awareness for d/c to home alone    [] Unable to maintain ordered weight bearing status    [] Patient with salient signs of long-standing immobility   [x] Decreased independence with ADLs   [x] Increased risk for falls   [] Other:     If pt is unable to be seen after this session, please let this note serve as discharge summary.  Please see case management note for discharge disposition.  Thank you.    Patient Diagnosis(es): The primary encounter diagnosis was Hyperglycemia. A diagnosis of Nausea and vomiting, unspecified vomiting type was also pertinent to this visit.     Assessment    Assessment: Pt supine in bed at start of session. Pt tolerates OT session well and requires encouragement throughout session. Pt completes bed mobility with supervision and functional mobility with CGA and RW to/from bathroom. Pt requires frequent cues for RW management and safety. Pt completes toileting needs and requires Sanjuana for brief management and CGA for hygiene in stance. Pt is limited by endurance, pain and fatigue - OT recommends SNF to increase pt's independence with ADLs/ mobility. Pt will continue to benefit from continued skilled OT services at this time.

## 2024-04-22 NOTE — DISCHARGE INSTR - COC
M41.20    SOBOE (shortness of breath on exertion) R06.02    Chronic pain disorder G89.4    DDD (degenerative disc disease), lumbar M51.36    Diabetic polyneuropathy associated with type 2 diabetes mellitus ([facility]) E11.42    Other secondary scoliosis, lumbosacral region M41.57    Thoracic spondylosis without myelopathy M47.814    Morbid obesity due to excess calories ([facility]) E66.01    Age-related nuclear cataract of both eyes H25.13    Hypermetropia, bilateral H52.03    Hypertensive retinopathy, bilateral H35.033    Vitreous degeneration, bilateral H43.813    Acute bilateral low back pain with left-sided sciatica M54.42    History of CVA (cerebrovascular accident) without residual deficits Z86.73    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 stage 3 chronic kidney disease (HCC) I13.0, I50.22, N18.30    S/P mastectomy, right Z90.11    Acute cystitis without hematuria N30.00    Hypomagnesemia E83.42    Chest pain R07.9    CAD S/P percutaneous coronary angioplasty I25.10, Z98.61    Hyperglycemia due to type 2 diabetes mellitus ([facility]) E11.65    Hx of heart artery stent Z95.5    Nuclear senile cataract H25.10    Unintentional weight loss R63.4    Chronic anemia D64.9    Facial abscess L02.01    Asymptomatic bacteriuria R82.71    Acute encephalopathy G93.40    Tobacco use disorder F17.200    Severe malnutrition ([facility]) E43    Acute right eye pain H57.11    Suspected condition R69    Bipolar affective disorder, currently depressed, moderate ([facility]) F31.32    Seasonal allergies J30.2    Symptomatic bradycardia R00.1    Dyspnea R06.00    Diffuse pain R52    Hypoglycemia E16.2    Acquired absence of other specified parts of digestive tract Z90.49    Atherosclerotic heart disease of native coronary artery without angina pectoris I25.10    Cognitive communication deficit R41.841    Hypertensive heart and chronic kidney disease with heart failure and stage 1 through stage 4 chronic kidney  Center  Address:  Phone:[phone]  Fax:[phone]        / signature: Electronically signed by Comfort Khan RN on 4/23/24 at 9:24 AM EDT    PHYSICIAN SECTION    Prognosis: Fair    Condition at Discharge: Stable    Rehab Potential (if transferring to Rehab): Fair    Recommended Labs or Other Treatments After Discharge: monitor BS/ BP , f/u with nephro / GI , Hb amd renal function need to be monitored , hold aspirin for 2 weeks    For Kidney, Blood pressure edema, and electrolyte Problems :  Please get BMP on 426/24, and weekly for three weeks  and fax to [phone], Dr Glen Plascencia, nephrologist at [facility] - also fax to Primary care Physician, and other specialties if appropriate. Nephrology was following the patient at the hospital for the following condition- SHARRON on CKD , hyperkalemia.  . Please call  Phone no. [phone] with above mentioned issues.     Thanks           Physician Certification: I certify the above information and transfer of Agustina Fried  is necessary for the continuing treatment of the diagnosis listed and that she requires skilled rehab for less 30 days.    Update Admission H&P: No change in H&P    PHYSICIAN SIGNATURE:  Electronically signed by Ankur Douglas MD on 4/22/24 at 1:30 PM EDT

## 2024-04-22 NOTE — PROGRESS NOTES
Hospital Medicine Progress Note      Date of Admission: 4/12/2024  Hospital Day: 11    Chief Admission Complaint:  elevated BS, not feeling well     Subjective:    In the bed ,  no cough , SOB or hypoxia   Denies urinary c/o  but over all weak   No melena , ab pain , N or V  Po intake is good    Presenting Admission History:       Agustina Fried is a 64 y.o. female with pmh of type II DM, CAD, stage III CKD, who presents with not feeling well last several dose.  Her glucometer was reading high.  She gives history of excessive thirst and urination for last 2 days.  She also states her tongue started flailing and was not able to talk straight.  Per patient, she has been compliant with her insulin regimen at home.     When seen in the emergency room, she appears extremely dry.  She is not a very good historian.  Denies any abdominal pain, chest pain, dizziness or lightheadedness.     Workup in the emergency room revealed very high blood glucose of 1011 mg/DL, negative ketones and normal anion gap.    Assessment/Plan:      Current Principal Problem:  Hyperosmolar hyperglycemic state (HHS) (HCC)    HHS  Type II DM  Presented with blood glucose of 1011 mg/DL  No anion gap, Negative ketones in urine  Received regular insulin 10 units in the emergency room  Has been taking 8 units nightly insulin and states she has been taking 4 units Premeal.  Questionable compliance  Obtain hemoglobin A1c in the morning.  Plan was to initiate on insulin drip and admitted to ICU.  However on repeat blood sugar check in the ED, sugars improving and currently in 400s.    started on Lantus 15 units nightly ( home dose )and medium dose sliding scale.- VT ed as of hypoglycemia .  8 units lantus am , med SSI  Diabetic diet   BS reviewed - elevated   Started pm lantus  8 units   Used to take 15 unts at home   BS reviewed -  better controlled       Translocational hyponatremia  Due to hyperglycemia  Sodium on presentation 121  8.0* 8.2* 9.0*   HCT 24.5* 25.5* 25.6* 29.7*     --  238  --      Recent Labs     04/20/24  0500 04/21/24  0429    139   K 5.2* 5.1    109   CO2 19* 21   BUN 42* 37*   CREATININE 2.9* 2.8*   CALCIUM 7.6* 7.6*     No results for input(s): \"PROBNP\", \"TROPHS\" in the last 72 hours.  No results for input(s): \"LABA1C\" in the last 72 hours.    Recent Labs     04/21/24 0429   AST 16   ALT 17   BILIDIR <0.2   BILITOT <0.2   ALKPHOS 160*     No results for input(s): \"INR\", \"LACTA\", \"TSH\" in the last 72 hours.      Urine Cultures:   Lab Results   Component Value Date/Time    LABURIN >100,000 CFU/ml 01/18/2024 04:29 PM     Blood Cultures:   Lab Results   Component Value Date/Time    BC No Growth after 4 days of incubation. 04/17/2024 06:19 AM     Lab Results   Component Value Date/Time    BLOODCULT2 No Growth after 4 days of incubation. 04/17/2024 06:19 AM     Organism:   Lab Results   Component Value Date/Time    ORG Proteus mirabilis 01/18/2024 04:29 PM         Ankur Douglas MD

## 2024-04-22 NOTE — PROGRESS NOTES
Ph: [phone], Fax: [phone]           [facility].[employer]               Reason for admission:                 Uncontrolled diabetes with hyperosmolar state    Brief Summary :     Agustina Fried is being seen by nephrology for SHARRON on CKD.  Initially admitted with uncontrolled diabetes and hyperosmolar state being admitted to ICU since got better in the ER itself  However course of hospitalization is complicated by variable blood pressure and SHARRON suspected to be ATN.  Also has significant drop of hemoglobin      Interval History and plan:     Creatinine down to 2.8  Repeat pending  Blood pressure high again  Alert and oriented but very weak    Plan:  Started back on amlodipine 5 mg a day  Okay to hold nitrates  Today's labs were not back at the time of assessment.I will follow labs  and update orders as appropriate, in the meantime please do not  Hesitate to call us with questions                     Assessment :        Acute Kidney Injury on CKD stage IIIa/  Creatinine 2.7 at the time of consult-down from peak of 2.9  Appears to be due to rapid lowering of blood pressure  Baseline creatinine 1-1.3  History of chronic diarrhea due to colectomy/recurrent SHARRON in the past  Has reduced renal mass-nephrectomy due to Wilms tumor in the past also CKD contributed by diabetes  Renal imaging-surgical absence of right kidney  Echo 9/21-no abnormality mentioned    Hypertension   BP: (162-176)/(68-78)  Pulse:  [64-66]   BP goal inpatient 130-140 systolic inpatient  Blood pressure as high as 200 systolic on presentation and went down to 112/51           [facility] would like to thank Ankur Douglas MD   for opportunity to serve this patient      Please call with questions at-   24 Hrs Answering service [phone] or  7 am- 5 pm via Perfect serve or cell phone  Dr.Sudhir Temo MD       HPI :     Agustina Fried is a 64 y.o. female presented to   the hospital on 4/12/2024 with known diabetes CAD

## 2024-04-22 NOTE — PLAN OF CARE
Problem: Discharge Planning  Goal: Discharge to home or other facility with appropriate resources  Outcome: Progressing     Problem: Chronic Conditions and Co-morbidities  Goal: Patient's chronic conditions and co-morbidity symptoms are monitored and maintained or improved  Outcome: Adequate for Discharge

## 2024-04-22 NOTE — CARE COORDINATION
Patient refused AM labs per RN documentation. Needs colonoscopy outpt and EGD once medically stable.She has not worked with therapy for a couple days and she refuses SNF. Plan is for home with [facility].

## 2024-04-23 LAB
ALBUMIN SERPL-MCNC: 1.6 G/DL (ref 3.4–5)
ANION GAP SERPL CALCULATED.3IONS-SCNC: 7 MMOL/L (ref 3–16)
BUN SERPL-MCNC: 28 MG/DL (ref 7–20)
CALCIUM SERPL-MCNC: 7.6 MG/DL (ref 8.3–10.6)
CHLORIDE SERPL-SCNC: 109 MMOL/L (ref 99–110)
CO2 SERPL-SCNC: 22 MMOL/L (ref 21–32)
CREAT SERPL-MCNC: 2.2 MG/DL (ref 0.6–1.2)
GFR SERPLBLD CREATININE-BSD FMLA CKD-EPI: 24 ML/MIN/{1.73_M2}
GLUCOSE BLD-MCNC: 135 MG/DL (ref 70–99)
GLUCOSE BLD-MCNC: 185 MG/DL (ref 70–99)
GLUCOSE BLD-MCNC: 318 MG/DL (ref 70–99)
GLUCOSE BLD-MCNC: 82 MG/DL (ref 70–99)
GLUCOSE SERPL-MCNC: 123 MG/DL (ref 70–99)
INR PPP: 1.03 (ref 0.85–1.15)
PERFORMED ON: ABNORMAL
PERFORMED ON: NORMAL
PHOSPHATE SERPL-MCNC: 3.2 MG/DL (ref 2.5–4.9)
POTASSIUM SERPL-SCNC: 5.1 MMOL/L (ref 3.5–5.1)
PROTHROMBIN TIME: 13.7 SEC (ref 11.9–14.9)
SODIUM SERPL-SCNC: 138 MMOL/L (ref 136–145)

## 2024-04-23 PROCEDURE — 97116 GAIT TRAINING THERAPY: CPT

## 2024-04-23 PROCEDURE — 2060000000 HC ICU INTERMEDIATE R&B

## 2024-04-23 PROCEDURE — 80069 RENAL FUNCTION PANEL: CPT

## 2024-04-23 PROCEDURE — 85610 PROTHROMBIN TIME: CPT

## 2024-04-23 PROCEDURE — 97110 THERAPEUTIC EXERCISES: CPT

## 2024-04-23 PROCEDURE — 2580000003 HC RX 258: Performed by: NURSE PRACTITIONER

## 2024-04-23 PROCEDURE — 6370000000 HC RX 637 (ALT 250 FOR IP): Performed by: INTERNAL MEDICINE

## 2024-04-23 PROCEDURE — 6360000002 HC RX W HCPCS: Performed by: INTERNAL MEDICINE

## 2024-04-23 PROCEDURE — 1200000000 HC SEMI PRIVATE

## 2024-04-23 PROCEDURE — 2580000003 HC RX 258: Performed by: INTERNAL MEDICINE

## 2024-04-23 PROCEDURE — 36415 COLL VENOUS BLD VENIPUNCTURE: CPT

## 2024-04-23 RX ORDER — AMLODIPINE BESYLATE 5 MG/1
10 TABLET ORAL DAILY
Status: DISCONTINUED | OUTPATIENT
Start: 2024-04-24 | End: 2024-04-25 | Stop reason: HOSPADM

## 2024-04-23 RX ORDER — AMLODIPINE BESYLATE 5 MG/1
5 TABLET ORAL ONCE
Status: COMPLETED | OUTPATIENT
Start: 2024-04-23 | End: 2024-04-23

## 2024-04-23 RX ADMIN — PANTOPRAZOLE SODIUM 40 MG: 40 TABLET, DELAYED RELEASE ORAL at 10:02

## 2024-04-23 RX ADMIN — ATORVASTATIN CALCIUM 40 MG: 40 TABLET, FILM COATED ORAL at 20:14

## 2024-04-23 RX ADMIN — INSULIN GLARGINE 8 UNITS: 100 INJECTION, SOLUTION SUBCUTANEOUS at 10:02

## 2024-04-23 RX ADMIN — AMLODIPINE BESYLATE 5 MG: 5 TABLET ORAL at 11:32

## 2024-04-23 RX ADMIN — AZITHROMYCIN MONOHYDRATE 500 MG: 500 INJECTION, POWDER, LYOPHILIZED, FOR SOLUTION INTRAVENOUS at 12:43

## 2024-04-23 RX ADMIN — AMLODIPINE BESYLATE 5 MG: 5 TABLET ORAL at 10:02

## 2024-04-23 RX ADMIN — INSULIN LISPRO 4 UNITS: 100 INJECTION, SOLUTION INTRAVENOUS; SUBCUTANEOUS at 20:13

## 2024-04-23 RX ADMIN — SODIUM CHLORIDE: 9 INJECTION, SOLUTION INTRAVENOUS at 11:38

## 2024-04-23 RX ADMIN — CEFTRIAXONE SODIUM 1000 MG: 1 INJECTION, POWDER, FOR SOLUTION INTRAMUSCULAR; INTRAVENOUS at 11:39

## 2024-04-23 RX ADMIN — INSULIN GLARGINE 10 UNITS: 100 INJECTION, SOLUTION SUBCUTANEOUS at 20:13

## 2024-04-23 ASSESSMENT — PAIN SCALES - WONG BAKER
WONGBAKER_NUMERICALRESPONSE: NO HURT

## 2024-04-23 ASSESSMENT — PAIN SCALES - GENERAL: PAINLEVEL_OUTOF10: 0

## 2024-04-23 NOTE — PROGRESS NOTES
Ph: [phone], Fax: [phone]           [email or web address]               Reason for admission:                 Uncontrolled diabetes with hyperosmolar state    Brief Summary :     Agustina Fried is being seen by nephrology for SHARRON on CKD.  Initially admitted with uncontrolled diabetes and hyperosmolar state being admitted to ICU since got better in the ER itself  However course of hospitalization is complicated by variable blood pressure and SHARRON suspected to be ATN.  Also has significant drop of hemoglobin      Interval History and plan:     Blood pressure is good  On room air  Creatinine coming down  Potassium very high yesterday at 5.8 given Lokelma   also likely due to hyperglycemia  Blood pressure coming down  Creatinine coming down to 2.2  Potassium down to 5.1    Plan:  Continue on amlodipine  Continue on low potassium diet  Increase the dose of amlodipine to 10 mg a day                     Assessment :        Acute Kidney Injury on CKD stage IIIa/  Creatinine 2.7 at the time of consult-down from peak of 2.9  Appears to be due to rapid lowering of blood pressure  Baseline creatinine 1-1.3  History of chronic diarrhea due to colectomy/recurrent SHARRON in the past  Has reduced renal mass-nephrectomy due to Wilms tumor in the past also CKD contributed by diabetes  Renal imaging-surgical absence of right kidney  Echo 9/21-no abnormality mentioned    Hypertension   BP: (166)/(78)  Pulse:  [57]   BP goal inpatient 130-140 systolic inpatient  Blood pressure as high as 200 systolic on presentation and went down to 112/51           [facility] would like to thank Ankur Douglas MD   for opportunity to serve this patient      Please call with questions at-   24 Hrs Answering service [phone] or  7 am- 5 pm via Perfect serve or cell phone  Dr.Sudhir Temo MD       HPI :     gAustina Fried is a 64 y.o. female presented to   the hospital on 4/12/2024 with known diabetes CAD CKD presented  radiculopathy 3/10/2017    Transient cerebral ischemia 07/15/2016    TIA:7/10/16    Unspecified cerebral artery occlusion with cerebral infarction     TIA          Medication:     Current Facility-Administered Medications: [START ON 4/24/2024] amLODIPine (NORVASC) tablet 10 mg, 10 mg, Oral, Daily  amLODIPine (NORVASC) tablet 5 mg, 5 mg, Oral, Once  cefTRIAXone (ROCEPHIN) 1,000 mg in sodium chloride 0.9 % 50 mL IVPB (mini-bag), 1,000 mg, IntraVENous, Q24H  azithromycin (ZITHROMAX) 500 mg in 250 mL addavial, 500 mg, IntraVENous, Q24H  ondansetron (ZOFRAN) injection 4 mg, 4 mg, IntraVENous, Q8H PRN  ondansetron (ZOFRAN-ODT) disintegrating tablet 4 mg, 4 mg, Oral, Q8H PRN  glucagon injection 1 mg, 1 mg, SubCUTAneous, PRN  insulin glargine (LANTUS) injection vial 10 Units, 10 Units, SubCUTAneous, Nightly  0.9 % sodium chloride infusion, , IntraVENous, PRN  0.9 % sodium chloride infusion, , IntraVENous, PRN  acetaminophen (TYLENOL) tablet 650 mg, 650 mg, Oral, Q6H PRN  [Held by provider] paliperidone (INVEGA) extended release tablet 3 mg, 3 mg, Oral, QAM  clonazePAM (KLONOPIN) tablet 0.5 mg, 0.5 mg, Oral, Daily PRN  insulin glargine (LANTUS) injection vial 8 Units, 8 Units, SubCUTAneous, Daily  glucose chewable tablet 16 g, 4 tablet, Oral, PRN  dextrose bolus 10% 125 mL, 125 mL, IntraVENous, PRN **OR** dextrose bolus 10% 250 mL, 250 mL, IntraVENous, PRN  dextrose 10 % infusion, , IntraVENous, Continuous PRN  atorvastatin (LIPITOR) tablet 40 mg, 40 mg, Oral, Nightly  [Held by provider] isosorbide mononitrate (IMDUR) extended release tablet 30 mg, 30 mg, Oral, Daily  oxyCODONE-acetaminophen (PERCOCET) 7.5-325 MG per tablet 1 tablet, 1 tablet, Oral, Q6H PRN  pantoprazole (PROTONIX) tablet 40 mg, 40 mg, Oral, Daily  [Held by provider] traZODone (DESYREL) tablet 300 mg, 300 mg, Oral, Nightly  potassium chloride 10 mEq/100 mL IVPB (Peripheral Line), 10 mEq, IntraVENous, PRN  magnesium sulfate 2000 mg in 50 mL IVPB premix, 2,000

## 2024-04-23 NOTE — PROGRESS NOTES
non-distended.  Musculoskeletal:  No edema  Neurologic:  Non-focal  Psychiatric:  Alert and oriented    /82   Pulse 62   Temp 98.2 °F (36.8 °C) (Oral)   Resp 16   Ht 1.575 m (5' 2\")   Wt 65.5 kg (144 lb 4.8 oz)   SpO2 96%   BMI 26.39 kg/m²     Diet: ADULT ORAL NUTRITION SUPPLEMENT; Breakfast, Lunch, Dinner; Diabetic Oral Supplement  ADULT DIET; Regular; 4 carb choices (60 gm/meal); Low Potassium (Less than 3000 mg/day)  DVT Prophylaxis: []PPx LMWH  []SQ Heparin  [x]IPC/SCDs  []Eliquis  []Xarelto  []Coumadin  []Other -      Code status: Full Code  PT/OT Eval Status:   []NOT yet ordered  []Ordered and Pending   [x]Seen with Recommendations for:  []Home independently  []Home w/ assist  []HHC  [x]SNF  []Acute Rehab  Pt  agreeable to  SNF  now     Anticipated Discharge Day/Date:  after scope -,pending GI    Anticipated Discharge Location: []Home  []HHC  [x]SNF  []Acute Rehab  []ECF  []LTAC  []Hospice  []Other -      Consults:      IP CONSULT TO DIABETES EDUCATOR  IP CONSULT TO NEPHROLOGY  IP CONSULT TO PHARMACY  IP CONSULT TO PHARMACY  IP CONSULT TO GI  IP CONSULT TO HOME CARE NEEDS      This patient has a high likelihood of being discharged tomorrow and is appropriate for A1 Discharge Unit in AM pending clinical course overnight: [x]Yes  []No    ------------------------------------------------------------------------------------------------------------------------------------------------------------------------    MDM      [x] High (any 2)    A. Problems (any 1)  [x] Acute/Chronic Illness/injury posing threat to life or bodily function:    [] Severe exacerbation of chronic illness:    ---------------------------------------------------------------------  B. Risk of Treatment (any 1)   [] Drugs/treatments that require intensive monitoring for toxicity include:    [] Change in code status:    [] Decision to escalate care:    [] Major surgery/procedure with associated risk factors:   29.7* 27.1*     --   --        Recent Labs     04/21/24  0429 04/22/24  1901    137   K 5.1 5.8*    107   CO2 21 22   BUN 37* 29*   CREATININE 2.8* 2.4*   CALCIUM 7.6* 7.7*       No results for input(s): \"PROBNP\", \"TROPHS\" in the last 72 hours.  No results for input(s): \"LABA1C\" in the last 72 hours.    Recent Labs     04/21/24 0429   AST 16   ALT 17   BILIDIR <0.2   BILITOT <0.2   ALKPHOS 160*       No results for input(s): \"INR\", \"LACTA\", \"TSH\" in the last 72 hours.      Urine Cultures:   Lab Results   Component Value Date/Time    LABURIN >100,000 CFU/ml 01/18/2024 04:29 PM     Blood Cultures:   Lab Results   Component Value Date/Time    BC No Growth after 4 days of incubation. 04/17/2024 06:19 AM     Lab Results   Component Value Date/Time    BLOODCULT2 No Growth after 4 days of incubation. 04/17/2024 06:19 AM     Organism:   Lab Results   Component Value Date/Time    ORG Proteus mirabilis 01/18/2024 04:29 PM         Ankur Douglas MD

## 2024-04-23 NOTE — PROGRESS NOTES
Occupational Therapy  Attempted to be seen this day. Upon arrival pt had blankets over her head. When speaking with writer pt did not take the blankets off of her head and was refusing therapy stating \"I guess you aren't going to let me sleep. I want to sleep\"    Will attempt at a later time/date as schedule allows and pt is agreeable.    Drea Bullard, OTR/L

## 2024-04-23 NOTE — PROGRESS NOTES
Physical Therapy  Facility/Department: [facility] C4 PCU  Daily Treatment Note  NAME: Agustina Fried  : 1960  MRN: [id number]    Date of Service: 2024    Discharge Recommendations:  Subacute/Skilled Nursing Facility   PT Equipment Recommendations  Equipment Needed: No  Other: defer    Therapy discharge recommendations take into account each patient's current medical complexities and are subject to input/oversight from the patient's healthcare team.   Barriers to Home Discharge:   [x] Steps to access home entry or bed/bath: 16 MIKEY    [x] Unable to transfer, ambulate, or propel wheelchair household distances without assist   [x] Limited available assist at home upon discharge - lives alone    [] Patient or family requests d/c to post-acute facility    [x] Poor safety awareness for d/c to home alone    []Unable to maintain ordered weight bearing status    [] Patient with salient signs of long-standing immobility   [x] Patient is at risk for falls   [] Other:    If pt is unable to be seen after this session, please let this note serve as discharge summary.  Please see case management note for discharge disposition.  Thank you.      Patient Diagnosis(es): The primary encounter diagnosis was Hyperglycemia. A diagnosis of Nausea and vomiting, unspecified vomiting type was also pertinent to this visit.    Assessment   Assessment: Pt tolerated treatment session fairly well this date, able to ambulate up to 45 ft with CGA using RW. Pt performed bed mobility with supv and SBA for transfers. Pt demonstrates decreased activity tolerance and inability to navigate household distances, and would continue to benefit from skilled therapy during LOS to progress mobility and independence as tolerated. Continue to recommend SNF at d/c due to current deficits.  Activity Tolerance: Patient limited by endurance;Patient limited by fatigue  Equipment Needed: No  Other: defer     Plan    Physical Therapy Plan  General Plan: 3-5 times  per week  Current Treatment Recommendations: Strengthening;Balance training;Functional mobility training;Transfer training;Wheelchair mobility training;Stair training;Neuromuscular re-education;Endurance training;Home exercise program;Cognitive reorientation;Pain management;Safety education & training;Therapeutic activities     Restrictions  Restrictions/Precautions  Restrictions/Precautions: Up as Tolerated, Fall Risk  Required Braces or Orthoses?: No  Position Activity Restriction  Other position/activity restrictions: Tele     Subjective    Subjective  Subjective: pt found in bed with RN on arrival, agreeable to therapy  Pain: denies pain at rest  Orientation  Overall Orientation Status: Within Functional Limits  Orientation Level: Oriented X4  Cognition  Overall Cognitive Status: Exceptions  Arousal/Alertness: Delayed responses to stimuli  Following Commands: Follows one step commands with increased time  Attention Span: Attends with cues to redirect  Memory: Decreased recall of recent events  Safety Judgement: Decreased awareness of need for assistance;Decreased awareness of need for safety  Problem Solving: Decreased awareness of errors;Assistance required to implement solutions;Assistance required to identify errors made  Insights: Decreased awareness of deficits  Initiation: Requires cues for some  Sequencing: Requires cues for some     Objective   Vitals  Assessed by RN just prior to entry:   148/71, 99% on RA, 65 bpm        Bed Mobility Training  Bed Mobility Training: Yes  Overall Level of Assistance: Supervision;Additional time  Interventions: Safety awareness training;Verbal cues;Visual cues  Supine to Sit: Supervision;Additional time  Sit to Supine: Supervision;Additional time  Scooting: Supervision (EOB)  Balance  Sitting: Intact  Standing: Impaired (with RW)  Standing - Static: Constant support;Good  Standing - Dynamic: Constant support;Fair  Transfer Training  Transfer Training: Yes  Overall Level

## 2024-04-23 NOTE — PROGRESS NOTES
Writer received call from Dr. Douglas, per MD speaking with nephro, if needed pt to have non tunneled CVC placed.  Writer informed MD that specials attempting again to place ultrasound guided IV.      1645 ultrasound guided IV was placed in pt's left forearm.  Cecily Thomas RN  4/23/2024

## 2024-04-23 NOTE — PROGRESS NOTES
BP (!) 166/78   Pulse 57   Temp 98.7 °F (37.1 °C) (Oral)   Resp 16   Ht 1.575 m (5' 2\")   Wt 65.5 kg (144 lb 4.8 oz)   SpO2 98%   BMI 26.39 kg/m²  on room air.  Pt woken by writer, pt states is \"tired\" but allowed writer to obtain vitals, most of assessment and a.m medications.  Pt denies pain, discomfort or shortness of breath at this time.  Lungs clear, diminished bases.  Pt is non tele.  Pt denies any needs at this time.  Bedside table, call light, fluids within reach.  Bed alarm activated on bed.  Avasys in place.  Pt instructed to call out for any needs and assistance.  Will continue to monitor.  Cecily Thomas RN  4/23/2024

## 2024-04-23 NOTE — PLAN OF CARE
Problem: Metabolic/Fluid and Electrolytes - Adult  Goal: Electrolytes maintained within normal limits  Outcome: Progressing  Note: Creatinine trending downward 2.2, as well as K 5.1 today.  Nephrology following.    Goal: Glucose maintained within prescribed range  Outcome: Progressing  Note: Blood sugar 's this shift.  Pt on medium SSI with meals and bedtime.  Pt on lantus 8 units daily as well as 10 units of lantus nightly.       Problem: Hematologic - Adult  Goal: Maintains hematologic stability  Outcome: Progressing  Note: H&H stable.  GI following, plans for EGD/colonoscopy on Thursday, 4/25/24.

## 2024-04-23 NOTE — CARE COORDINATION
Patient had refused D/C and wanted home care up until yesterday when she was medically ready for discharge . She then stated she now wanted to go to SNF. She wanted a referral to [facility] which was initiated and Shoshana in admissions accepted patient. She stated pre-cert today after HENS was completed and it is now pending.

## 2024-04-23 NOTE — PROGRESS NOTES
Spoke with primary RN regarding tunneled CVC. Santino with DIT agreeable to attempt us IV, if unsuccessful plan to place tunneled cvc Wednesday morning.

## 2024-04-23 NOTE — PLAN OF CARE
Problem: Discharge Planning  Goal: Discharge to home or other facility with appropriate resources  4/22/2024 2111 by Felipe Cooper RN  Outcome: Progressing  4/22/2024 1133 by Debora Aaron RN  Outcome: Progressing     Problem: Pain  Goal: Verbalizes/displays adequate comfort level or baseline comfort level  Outcome: Progressing     Problem: Safety - Adult  Goal: Free from fall injury  Outcome: Progressing     Problem: ABCDS Injury Assessment  Goal: Absence of physical injury  Outcome: Progressing     Problem: Skin/Tissue Integrity  Goal: Absence of new skin breakdown  Description: 1.  Monitor for areas of redness and/or skin breakdown  2.  Assess vascular access sites hourly  3.  Every 4-6 hours minimum:  Change oxygen saturation probe site  4.  Every 4-6 hours:  If on nasal continuous positive airway pressure, respiratory therapy assess nares and determine need for appliance change or resting period.  Outcome: Progressing     Problem: Chronic Conditions and Co-morbidities  Goal: Patient's chronic conditions and co-morbidity symptoms are monitored and maintained or improved  4/22/2024 2111 by Felipe Cooper RN  Outcome: Progressing  4/22/2024 1133 by Debora Aaron RN  Outcome: Adequate for Discharge     Problem: Cardiovascular - Adult  Goal: Absence of cardiac dysrhythmias or at baseline  Outcome: Progressing  Goal: Maintains optimal cardiac output and hemodynamic stability  Outcome: Progressing     Problem: Musculoskeletal - Adult  Goal: Return mobility to safest level of function  Outcome: Progressing     Problem: Metabolic/Fluid and Electrolytes - Adult  Goal: Electrolytes maintained within normal limits  Outcome: Progressing  Goal: Glucose maintained within prescribed range  Outcome: Progressing     Problem: Neurosensory - Adult  Goal: Achieves stable or improved neurological status  Outcome: Progressing     Problem: Respiratory - Adult  Goal: Achieves optimal ventilation and oxygenation  Outcome:

## 2024-04-23 NOTE — PROGRESS NOTES
PROGRESS NOTE    HPI: Agustina Fried is a(n)64 y.o. female admitted for work-up and treatment for Hyperglycemia [R73.9]  Hyperosmolar hyperglycemic state (HHS) (HCC) [E11.00]  Nausea and vomiting, unspecified vomiting type [R11.2].     We are following for anemia.    Subjective:     She did not refuse morning labs or meds today.  No new complaints. Is tired.       Objective:     I/O last 3 completed shifts:  In: 1929.2 [P.O.:1640; IV Piggyback:289.2]  Out: 300 [Urine:300]      BP (!) 183/75 Comment: pt was up to bathroom, will monitor  Pulse 65   Temp 98.3 °F (36.8 °C) (Oral)   Resp 16   Ht 1.575 m (5' 2\")   Wt 65.5 kg (144 lb 4.8 oz)   SpO2 100%   BMI 26.39 kg/m²     Physical Exam:  HEENT: anicteric sclera, oropharyngeal membranes pink and moist.  Cor: RRR  Lungs: non-labored, no respiratory distress  Abdomen: non distended, soft, NT  Neuro: alert and oriented x 3      Results:   Lab Results   Component Value Date    ALT 17 04/21/2024    AST 16 04/21/2024     (H) 04/21/2024    ALKPHOS 160 (H) 04/21/2024    BILIDIR <0.2 04/21/2024    PROT 5.9 (L) 04/21/2024    INR 0.99 09/05/2023    LIPASE 4.0 (L) 04/12/2024     Lab Results   Component Value Date    WBC 8.2 04/21/2024    HGB 8.4 (L) 04/22/2024    HCT 27.1 (L) 04/22/2024    MCV 89.7 04/21/2024     04/21/2024     BUN/Cr/glu/ALT/AST/amyl/lip:  28/2.2/--/--/--/--/-- (04/23 0939)  CT ABDOMEN PELVIS WO CONTRAST Additional Contrast? None    Result Date: 4/19/2024  Limited noncontrast examination of the abdomen and pelvis with no findings to explain provided history of anemia. Moderate right and small left pleural effusions.     US RENAL COMPLETE    Result Date: 4/18/2024  1. No evidence of hydronephrosis.     XR CHEST PORTABLE    Result Date: 4/17/2024  New right basilar opacity.  Atelectasis is favored over pneumonia given the appearance.     US RENAL COMPLETE    Result Date: 4/14/2024  Status post  right nephrectomy.  The left kidney is unremarkable.         Impression:  62 year old female with past medical history of DM, CKD, HTN, Wilm's tumor s/p right nephrectomy, breast cancer 2014 s/p radiation and lumpectomy, CAD on Brilinta/ASA, depression, anxiety, chronic anemia and invasive adenocarcinoma of the right colon s/p rt colectomy 3/2023 and C. Diff 11/2023 admitted with hyperosmolar hypergylcemic state.     Normocytic anemia.  5 g drop in Hgb requiring transfusion on 4/18.  No overt signs of GI bleeding.  Stool heme negative.  CTap without source.  Suspect multifactorial  with anemia of chronic disease playing a role.  Iron studies WNL.    2. Pneumonia.    3. Acute on chronic kidney disease.    4. Elevated alk phos.   Double ductal sign and pancreatic calcifications on recent US.  M2Ab normal, RHONDA negative, GGT elevated, alk phos isoenzymes consistent with hepatic source.     5. HHS. Initially on insulin drip, now off.    Plan:  Patient is now agreeable to SNF and getting EGD and colonoscopy done. Plan clear liquid diet tomorrow and procedures on Thursday morning.   She also needs an EUS outpatient in evaluation of elevated alk phos and biliary ductal dilation.    Please do not hesitate to call with questions or concerns.      Electronically signed by: SARTHAK Bruce 4/23/2024 12:31 PM     (Office) [phone]  (Fax) [phone]  Available via perfect serve

## 2024-04-23 NOTE — PROGRESS NOTES
Pt lost IV access, unable to obtain IV access with ultrasound guidance.  Writer sent message to nephrology regarding.  Per Dr. Plascencia pt would need a tunneled picc placed by IR.  Writer sent message to Dr. Douglas regarding.  Cecily Thomas RN  4/23/2024

## 2024-04-24 LAB
ANION GAP SERPL CALCULATED.3IONS-SCNC: 8 MMOL/L (ref 3–16)
BASOPHILS # BLD: 0 K/UL (ref 0–0.2)
BASOPHILS NFR BLD: 0.6 %
BUN SERPL-MCNC: 27 MG/DL (ref 7–20)
CALCIUM SERPL-MCNC: 8.2 MG/DL (ref 8.3–10.6)
CHLORIDE SERPL-SCNC: 107 MMOL/L (ref 99–110)
CO2 SERPL-SCNC: 23 MMOL/L (ref 21–32)
CREAT SERPL-MCNC: 2.2 MG/DL (ref 0.6–1.2)
DEPRECATED RDW RBC AUTO: 16.3 % (ref 12.4–15.4)
EOSINOPHIL # BLD: 0.1 K/UL (ref 0–0.6)
EOSINOPHIL NFR BLD: 0.9 %
GFR SERPLBLD CREATININE-BSD FMLA CKD-EPI: 24 ML/MIN/{1.73_M2}
GLUCOSE BLD-MCNC: 130 MG/DL (ref 70–99)
GLUCOSE BLD-MCNC: 150 MG/DL (ref 70–99)
GLUCOSE BLD-MCNC: 264 MG/DL (ref 70–99)
GLUCOSE BLD-MCNC: 57 MG/DL (ref 70–99)
GLUCOSE BLD-MCNC: 70 MG/DL (ref 70–99)
GLUCOSE SERPL-MCNC: 66 MG/DL (ref 70–99)
HCT VFR BLD AUTO: 26.5 % (ref 36–48)
HGB BLD-MCNC: 8.4 G/DL (ref 12–16)
LYMPHOCYTES # BLD: 2.7 K/UL (ref 1–5.1)
LYMPHOCYTES NFR BLD: 32.7 %
MCH RBC QN AUTO: 29.2 PG (ref 26–34)
MCHC RBC AUTO-ENTMCNC: 31.8 G/DL (ref 31–36)
MCV RBC AUTO: 91.6 FL (ref 80–100)
MONOCYTES # BLD: 0.5 K/UL (ref 0–1.3)
MONOCYTES NFR BLD: 5.6 %
NEUTROPHILS # BLD: 4.9 K/UL (ref 1.7–7.7)
NEUTROPHILS NFR BLD: 60.2 %
PERFORMED ON: ABNORMAL
PERFORMED ON: NORMAL
PLATELET # BLD AUTO: 235 K/UL (ref 135–450)
PMV BLD AUTO: 9.9 FL (ref 5–10.5)
POTASSIUM SERPL-SCNC: 5.2 MMOL/L (ref 3.5–5.1)
RBC # BLD AUTO: 2.89 M/UL (ref 4–5.2)
SODIUM SERPL-SCNC: 138 MMOL/L (ref 136–145)
WBC # BLD AUTO: 8.1 K/UL (ref 4–11)

## 2024-04-24 PROCEDURE — 2580000003 HC RX 258: Performed by: INTERNAL MEDICINE

## 2024-04-24 PROCEDURE — 6370000000 HC RX 637 (ALT 250 FOR IP): Performed by: INTERNAL MEDICINE

## 2024-04-24 PROCEDURE — 6360000002 HC RX W HCPCS: Performed by: INTERNAL MEDICINE

## 2024-04-24 PROCEDURE — 80048 BASIC METABOLIC PNL TOTAL CA: CPT

## 2024-04-24 PROCEDURE — 97116 GAIT TRAINING THERAPY: CPT

## 2024-04-24 PROCEDURE — 36415 COLL VENOUS BLD VENIPUNCTURE: CPT

## 2024-04-24 PROCEDURE — 6370000000 HC RX 637 (ALT 250 FOR IP): Performed by: NURSE PRACTITIONER

## 2024-04-24 PROCEDURE — 97530 THERAPEUTIC ACTIVITIES: CPT

## 2024-04-24 PROCEDURE — 1200000000 HC SEMI PRIVATE

## 2024-04-24 PROCEDURE — 85025 COMPLETE CBC W/AUTO DIFF WBC: CPT

## 2024-04-24 RX ORDER — BISACODYL 5 MG/1
10 TABLET, DELAYED RELEASE ORAL ONCE
Status: COMPLETED | OUTPATIENT
Start: 2024-04-24 | End: 2024-04-24

## 2024-04-24 RX ORDER — POLYETHYLENE GLYCOL 3350 17 G/17G
238 POWDER, FOR SOLUTION ORAL ONCE
Status: COMPLETED | OUTPATIENT
Start: 2024-04-24 | End: 2024-04-24

## 2024-04-24 RX ADMIN — ATORVASTATIN CALCIUM 40 MG: 40 TABLET, FILM COATED ORAL at 19:35

## 2024-04-24 RX ADMIN — POLYETHYLENE GLYCOL 3350 238 G: 17 POWDER, FOR SOLUTION ORAL at 18:16

## 2024-04-24 RX ADMIN — ONDANSETRON 4 MG: 2 INJECTION INTRAMUSCULAR; INTRAVENOUS at 19:35

## 2024-04-24 RX ADMIN — AZITHROMYCIN MONOHYDRATE 500 MG: 500 INJECTION, POWDER, LYOPHILIZED, FOR SOLUTION INTRAVENOUS at 13:53

## 2024-04-24 RX ADMIN — AMLODIPINE BESYLATE 10 MG: 5 TABLET ORAL at 08:08

## 2024-04-24 RX ADMIN — PANTOPRAZOLE SODIUM 40 MG: 40 TABLET, DELAYED RELEASE ORAL at 08:08

## 2024-04-24 RX ADMIN — Medication 16 G: at 08:09

## 2024-04-24 RX ADMIN — CEFTRIAXONE SODIUM 1000 MG: 1 INJECTION, POWDER, FOR SOLUTION INTRAMUSCULAR; INTRAVENOUS at 12:56

## 2024-04-24 RX ADMIN — BISACODYL 10 MG: 5 TABLET, COATED ORAL at 16:48

## 2024-04-24 RX ADMIN — OXYCODONE AND ACETAMINOPHEN 1 TABLET: 7.5; 325 TABLET ORAL at 13:00

## 2024-04-24 RX ADMIN — INSULIN GLARGINE 8 UNITS: 100 INJECTION, SOLUTION SUBCUTANEOUS at 12:50

## 2024-04-24 RX ADMIN — INSULIN LISPRO 4 UNITS: 100 INJECTION, SOLUTION INTRAVENOUS; SUBCUTANEOUS at 12:51

## 2024-04-24 ASSESSMENT — PAIN SCALES - GENERAL
PAINLEVEL_OUTOF10: 7
PAINLEVEL_OUTOF10: 0
PAINLEVEL_OUTOF10: 9

## 2024-04-24 ASSESSMENT — PAIN DESCRIPTION - LOCATION: LOCATION: BACK

## 2024-04-24 ASSESSMENT — PAIN DESCRIPTION - DESCRIPTORS: DESCRIPTORS: ACHING

## 2024-04-24 ASSESSMENT — PAIN DESCRIPTION - ORIENTATION: ORIENTATION: LOWER;MID;UPPER

## 2024-04-24 NOTE — CARE COORDINATION
Informed by RN, that patient had finally agreed to colonoscopy and EGD - planned now for tomorrow 4/25. Is beginning prep.   Pre cert approved for [facility] thru 4/26.    Following.      Lorin Lewis RN

## 2024-04-24 NOTE — PLAN OF CARE
Problem: Pain  Goal: Verbalizes/displays adequate comfort level or baseline comfort level  Outcome: Progressing  Note: Pt with complaints of chronic back pain this shift, medicated with prn percocet with some effectiveness.       Problem: Metabolic/Fluid and Electrolytes - Adult  Goal: Electrolytes maintained within normal limits  Outcome: Progressing  Note: Creatinine stabilized 2.2 today, nephro still following.  K 5.2.  Goal: Glucose maintained within prescribed range  Outcome: Progressing  Note: Blood sugars 's this shift.  Pt with given prn glucose chewable tablets this a.m.  pt on SSI with meal and at bed time.  Pt given lantus 8 units daily.  Nightly lantus was discontinued by MD.      Problem: Hematologic - Adult  Goal: Maintains hematologic stability  Outcome: Progressing  Note: H&H stable 8.4/26.5 today.  Pt NPO at midnight with plans for EGD/colonoscopy tomorrow, April 25th.

## 2024-04-24 NOTE — PLAN OF CARE
2005)  Electrolytes maintained within normal limits:   Monitor labs and assess patient for signs and symptoms of electrolyte imbalances   Administer electrolyte replacement as ordered   Monitor response to electrolyte replacements, including repeat lab results as appropriate  Goal: Glucose maintained within prescribed range  4/23/2024 2254 by Mary Jane Gilbert, RN  Outcome: Progressing  Flowsheets (Taken 4/23/2024 2005)  Glucose maintained within prescribed range:   Monitor blood glucose as ordered   Assess for signs and symptoms of hyperglycemia and hypoglycemia   Administer ordered medications to maintain glucose within target range     Problem: Neurosensory - Adult  Goal: Achieves stable or improved neurological status  Outcome: Progressing  Flowsheets (Taken 4/23/2024 2005)  Achieves stable or improved neurological status: Assess for and report changes in neurological status     Problem: Respiratory - Adult  Goal: Achieves optimal ventilation and oxygenation  Outcome: Progressing  Flowsheets (Taken 4/23/2024 2005)  Achieves optimal ventilation and oxygenation:   Assess for changes in respiratory status   Assess for changes in mentation and behavior   Position to facilitate oxygenation and minimize respiratory effort     Problem: Skin/Tissue Integrity - Adult  Goal: Incisions, wounds, or drain sites healing without S/S of infection  Outcome: Progressing     Problem: Gastrointestinal - Adult  Goal: Maintains or returns to baseline bowel function  Outcome: Progressing  Flowsheets (Taken 4/23/2024 2005)  Maintains or returns to baseline bowel function:   Assess bowel function   Encourage oral fluids to ensure adequate hydration   Administer ordered medications as needed   Encourage mobilization and activity     Problem: Genitourinary - Adult  Goal: Absence of urinary retention  Outcome: Progressing  Flowsheets (Taken 4/23/2024 2005)  Absence of urinary retention:   Assess patient’s ability to void and empty bladder

## 2024-04-24 NOTE — PROGRESS NOTES
BP (!) 151/62   Pulse 60   Temp 98.2 °F (36.8 °C) (Oral)   Resp 16   Ht 1.575 m (5' 2\")   Wt 63.2 kg (139 lb 4.8 oz)   SpO2 100%   BMI 25.48 kg/m²  on room air.  Pt resting quietly in bed, denies pain, discomfort or shortness of breath. Lungs clear, diminished.  Pt is non tele.  Pt denies any needs at this time.  Bedside table, call light, clear liquid tray within reach.  Bed alarm activated on bed.  Avasys in place.  Pt instructed to call out for any needs and assistance.  Will continue to monitor.  Cecily Thomas, RN  4/24/2024

## 2024-04-24 NOTE — PROGRESS NOTES
Hospital Medicine Progress Note      Date of Admission: 4/12/2024  Hospital Day: 13    Chief Admission Complaint:  elevated BS, not feeling well     Subjective:    In the bed ,  no cough , SOB or hypoxia   Denies urinary c/o  but over all weak   No melena , ab pain , N or V  Po intake is good    Agreeable to scope   Awaiting for scope tomorrow  Presenting Admission History:       Agustina Fried is a 64 y.o. female with pmh of type II DM, CAD, stage III CKD, who presents with not feeling well last several dose.  Her glucometer was reading high.  She gives history of excessive thirst and urination for last 2 days.  She also states her tongue started flailing and was not able to talk straight.  Per patient, she has been compliant with her insulin regimen at home.     When seen in the emergency room, she appears extremely dry.  She is not a very good historian.  Denies any abdominal pain, chest pain, dizziness or lightheadedness.     Workup in the emergency room revealed very high blood glucose of 1011 mg/DL, negative ketones and normal anion gap.    Assessment/Plan:      Current Principal Problem:  Hyperosmolar hyperglycemic state (HHS) (HCC)    HHS  Type II DM  Presented with blood glucose of 1011 mg/DL  No anion gap, Negative ketones in urine  Received regular insulin 10 units in the emergency room  Has been taking 8 units nightly insulin and states she has been taking 4 units Premeal.  Questionable compliance  Obtain hemoglobin A1c in the morning.  Plan was to initiate on insulin drip and admitted to ICU.  However on repeat blood sugar check in the ED, sugars improving and currently in 400s.    started on Lantus 15 units nightly ( home dose )and medium dose sliding scale.- dc ed as of hypoglycemia .  8 units lantus am , and p.m. med SSI  Diabetic diet   BS reviewed -no this morning   Hypoglycemia possibly secondary to liquid diet  May DC the p.m. Lantus    Translocational hyponatremia  Due to hyperglycemia  Sodium        Recent Labs     04/22/24  1901 04/23/24  0939 04/24/24  0514    138 138   K 5.8* 5.1 5.2*    109 107   CO2 22 22 23   BUN 29* 28* 27*   CREATININE 2.4* 2.2* 2.2*   CALCIUM 7.7* 7.6* 8.2*   PHOS  --  3.2  --        No results for input(s): \"PROBNP\", \"TROPHS\" in the last 72 hours.  No results for input(s): \"LABA1C\" in the last 72 hours.    No results for input(s): \"AST\", \"ALT\", \"BILIDIR\", \"BILITOT\", \"ALKPHOS\" in the last 72 hours.    Recent Labs     04/23/24  1622   INR 1.03         Urine Cultures:   Lab Results   Component Value Date/Time    LABURIN >100,000 CFU/ml 01/18/2024 04:29 PM     Blood Cultures:   Lab Results   Component Value Date/Time    BC No Growth after 4 days of incubation. 04/17/2024 06:19 AM     Lab Results   Component Value Date/Time    BLOODCULT2 No Growth after 4 days of incubation. 04/17/2024 06:19 AM     Organism:   Lab Results   Component Value Date/Time    ORG Proteus mirabilis 01/18/2024 04:29 PM         Ankur Douglas MD

## 2024-04-24 NOTE — CARE COORDINATION
This writer spoke with Shoshana (liaison) [facility] regarding pre cert status. Ref # [id number]. Still waiting for approval.     Following.      Lorin Lewis RN

## 2024-04-24 NOTE — PROGRESS NOTES
PROGRESS NOTE    HPI: Agustina Fried is a(n)64 y.o. female admitted for work-up and treatment for Hyperglycemia [R73.9]  Hyperosmolar hyperglycemic state (HHS) (HCC) [E11.00]  Nausea and vomiting, unspecified vomiting type [R11.2].     We are following for anemia.    Subjective:     No new complaints today.  She is feeling well.      Objective:     I/O last 3 completed shifts:  In: 1920 [P.O.:1920]  Out: 1500 [Urine:1500]      BP (!) 121/58   Pulse 66   Temp 97.9 °F (36.6 °C) (Oral)   Resp 16   Ht 1.575 m (5' 2\")   Wt 63.2 kg (139 lb 4.8 oz)   SpO2 100%   BMI 25.48 kg/m²     Physical Exam:  HEENT: anicteric sclera, oropharyngeal membranes pink and moist.  Cor: RRR  Lungs: non-labored, no respiratory distress  Abdomen: non distended, soft, NT  Neuro: alert and oriented x 3      Results:   Lab Results   Component Value Date    ALT 17 04/21/2024    AST 16 04/21/2024     (H) 04/21/2024    ALKPHOS 160 (H) 04/21/2024    BILIDIR <0.2 04/21/2024    PROT 5.9 (L) 04/21/2024    INR 1.03 04/23/2024    LIPASE 4.0 (L) 04/12/2024     Lab Results   Component Value Date    WBC 8.1 04/24/2024    HGB 8.4 (L) 04/24/2024    HCT 26.5 (L) 04/24/2024    MCV 91.6 04/24/2024     04/24/2024     BUN/Cr/glu/ALT/AST/amyl/lip:  27/2.2/--/--/--/--/-- (04/24 0514)  CT ABDOMEN PELVIS WO CONTRAST Additional Contrast? None    Result Date: 4/19/2024  Limited noncontrast examination of the abdomen and pelvis with no findings to explain provided history of anemia. Moderate right and small left pleural effusions.     US RENAL COMPLETE    Result Date: 4/18/2024  1. No evidence of hydronephrosis.     XR CHEST PORTABLE    Result Date: 4/17/2024  New right basilar opacity.  Atelectasis is favored over pneumonia given the appearance.     US RENAL COMPLETE    Result Date: 4/14/2024  Status post right nephrectomy.  The left kidney is unremarkable.         Impression:  62 year old female

## 2024-04-24 NOTE — PROGRESS NOTES
Physical Therapy  Facility/Department: [facility] C[facility]  Daily Treatment Note  NAME: Agustina Fried  : 1960  MRN: [id number]    Date of Service: 2024    Discharge Recommendations:  Subacute/Skilled Nursing Facility   PT Equipment Recommendations  Equipment Needed: No  Other: defer    Therapy discharge recommendations take into account each patient's current medical complexities and are subject to input/oversight from the patient's healthcare team.   Barriers to Home Discharge:   [x] Steps to access home entry or bed/bath: 16 MIKEY    [x] Unable to transfer, ambulate, or propel wheelchair household distances without assist   [x] Limited available assist at home upon discharge - lives alone    [] Patient or family requests d/c to post-acute facility    [x] Poor safety awareness for d/c to home alone    []Unable to maintain ordered weight bearing status    [] Patient with salient signs of long-standing immobility   [x] Patient is at risk for falls   [] Other:     If pt is unable to be seen after this session, please let this note serve as discharge summary.  Please see case management note for discharge disposition.  Thank you.    Patient Diagnosis(es): The primary encounter diagnosis was Hyperglycemia. A diagnosis of Nausea and vomiting, unspecified vomiting type was also pertinent to this visit.    Assessment   Assessment: Pt tolerated treatment session fairly well this date, able to ambulate up to 50ft with CGA using RW. Pt performed bed mobility and transfers with SBA. Pt continues to demonstrate decreaed activity tolerance, balance and functional mobility  Pt would continue to benefit from skilled therapy during LOS to progress mobility and independence as tolerated. Continue to recommend SNF at d/c due to current deficits.  Activity Tolerance: Patient limited by endurance;Patient limited by fatigue  Equipment Needed: No  Other: defer     Plan    Physical Therapy Plan  General Plan: 3-5 times per  Term Goals  Time Frame for Short Term Goals: 4/21--Goals extended to 4/29 due to inc length of stay and slow progress towards goals--4/24 all unmet goals continue  Short Term Goal 1: Pt will transfer from sit to stand with supervision and LRAD--4/24 MET  Short Term Goal 2: Pt will ambulate 50' w/ LRAD and SBA  Short Term Goal 3: Pt will ascend/descend 10 steps with SBA and LRAD  Short Term Goal 4: Pt will tolerate formal balance assessment by 4/23 - extend to 4/27  Patient Goals   Patient Goals : \"to go home\"    Education  Patient Education  Education Given To: Patient  Education Provided: Role of Therapy;Fall Prevention Strategies;Orientation;Transfer Training;Energy Conservation;Equipment;Precautions;Plan of Care  Education Provided Comments: Hand placement with transfers, importance of participating in therapy  Education Method: Demonstration;Verbal  Barriers to Learning: Cognition  Education Outcome: Continued education needed    AM-PAC - Mobility    AM-PAC Basic Mobility - Inpatient   How much help is needed turning from your back to your side while in a flat bed without using bedrails?: A Little  How much help is needed moving from lying on your back to sitting on the side of a flat bed without using bedrails?: A Little  How much help is needed moving to and from a bed to a chair?: A Little  How much help is needed standing up from a chair using your arms?: A Little  How much help is needed walking in hospital room?: A Little  How much help is needed climbing 3-5 steps with a railing?: A Lot  AM-PAC Inpatient Mobility Raw Score : 17  AM-PAC Inpatient T-Scale Score : 42.13  Mobility Inpatient CMS 0-100% Score: 50.57  Mobility Inpatient CMS G-Code Modifier : CK    Therapy Time   Individual Concurrent Group Co-treatment   Time In 1003         Time Out 1026         Minutes 23         Timed Code Treatment Minutes: 23 Minutes       Melanie Baugh PT

## 2024-04-24 NOTE — PROGRESS NOTES
Ph: [phone], Fax: [phone]           [facility]               Reason for admission:                 Uncontrolled diabetes with hyperosmolar state    Brief Summary :     Agustina Fried is being seen by nephrology for SHARRON on CKD.  Initially admitted with uncontrolled diabetes and hyperosmolar state being admitted to ICU since got better in the ER itself  However course of hospitalization is complicated by variable blood pressure and SHARRON suspected to be ATN.  Also has significant drop of hemoglobin      Interval History and plan:     Blood pressure is variable  Amlodipine dose increased to 10 mg yesterday  Potassium 5.2  Creatinine down to 2.2 and remains same  Plan for EGD, colonoscopy tomorrow     Plan:  Continue on low-dose potassium diet  Amlodipine also                Assessment :        Acute Kidney Injury on CKD stage IIIa/  Creatinine 2.7 at the time of consult-down from peak of 2.9  Appears to be due to rapid lowering of blood pressure  Baseline creatinine 1-1.3  History of chronic diarrhea due to colectomy/recurrent SHARRON in the past  Has reduced renal mass-nephrectomy due to Wilms tumor in the past also CKD contributed by diabetes  Renal imaging-surgical absence of right kidney  Echo 9/21-no abnormality mentioned    Hypertension   BP: (173)/(66)  Pulse:  [63]   BP goal inpatient 130-140 systolic inpatient  Blood pressure as high as 200 systolic on presentation and went down to 112/51           [facility] would like to thank Ankur Douglas MD   for opportunity to serve this patient      Please call with questions at-   24 Hrs Answering service [phone] or  7 am- 5 pm via Perfect serve or cell phone  Dr.Sudhir Temo MD       HPI :     Agustina Fried is a 64 y.o. female presented to   the hospital on 4/12/2024 with known diabetes CAD CKD presented with feeling unwell for several days.  Also very high reading on the glucometer.  Also treated with increased thirst

## 2024-04-24 NOTE — PROGRESS NOTES
Blood sugar 57, prn chewable glucose tablets given.  Will monitor blood sugar.  Cecily Thomas RN  4/24/2024    0837 repeat blood sugar 70, pt having clear liquid breakfast tray. Cecily Thomas RN  4/24/2024

## 2024-04-25 ENCOUNTER — ANESTHESIA EVENT (OUTPATIENT)
Dept: ENDOSCOPY | Age: 64
End: 2024-04-25
Payer: MEDICAID

## 2024-04-25 ENCOUNTER — ANESTHESIA (OUTPATIENT)
Dept: ENDOSCOPY | Age: 64
End: 2024-04-25
Payer: MEDICAID

## 2024-04-25 VITALS
OXYGEN SATURATION: 100 % | HEIGHT: 62 IN | TEMPERATURE: 97.6 F | RESPIRATION RATE: 16 BRPM | SYSTOLIC BLOOD PRESSURE: 146 MMHG | HEART RATE: 60 BPM | BODY MASS INDEX: 25.45 KG/M2 | WEIGHT: 138.3 LBS | DIASTOLIC BLOOD PRESSURE: 72 MMHG

## 2024-04-25 LAB
ANION GAP SERPL CALCULATED.3IONS-SCNC: 9 MMOL/L (ref 3–16)
BASOPHILS # BLD: 0.1 K/UL (ref 0–0.2)
BASOPHILS NFR BLD: 1.1 %
BUN SERPL-MCNC: 22 MG/DL (ref 7–20)
CALCIUM SERPL-MCNC: 8.4 MG/DL (ref 8.3–10.6)
CHLORIDE SERPL-SCNC: 105 MMOL/L (ref 99–110)
CO2 SERPL-SCNC: 25 MMOL/L (ref 21–32)
CREAT SERPL-MCNC: 1.9 MG/DL (ref 0.6–1.2)
DEPRECATED RDW RBC AUTO: 17.1 % (ref 12.4–15.4)
EOSINOPHIL # BLD: 0.1 K/UL (ref 0–0.6)
EOSINOPHIL NFR BLD: 1 %
GFR SERPLBLD CREATININE-BSD FMLA CKD-EPI: 29 ML/MIN/{1.73_M2}
GLUCOSE BLD-MCNC: 137 MG/DL (ref 70–99)
GLUCOSE BLD-MCNC: 152 MG/DL (ref 70–99)
GLUCOSE BLD-MCNC: 43 MG/DL (ref 70–99)
GLUCOSE BLD-MCNC: 59 MG/DL (ref 70–99)
GLUCOSE BLD-MCNC: 77 MG/DL (ref 70–99)
GLUCOSE SERPL-MCNC: 40 MG/DL (ref 70–99)
HCT VFR BLD AUTO: 25.9 % (ref 36–48)
HGB BLD-MCNC: 8.2 G/DL (ref 12–16)
LYMPHOCYTES # BLD: 2.7 K/UL (ref 1–5.1)
LYMPHOCYTES NFR BLD: 29.4 %
MCH RBC QN AUTO: 28.5 PG (ref 26–34)
MCHC RBC AUTO-ENTMCNC: 31.8 G/DL (ref 31–36)
MCV RBC AUTO: 89.7 FL (ref 80–100)
MONOCYTES # BLD: 0.6 K/UL (ref 0–1.3)
MONOCYTES NFR BLD: 6.3 %
NEUTROPHILS # BLD: 5.6 K/UL (ref 1.7–7.7)
NEUTROPHILS NFR BLD: 62.2 %
PERFORMED ON: ABNORMAL
PERFORMED ON: NORMAL
PLATELET # BLD AUTO: 254 K/UL (ref 135–450)
PMV BLD AUTO: 9.8 FL (ref 5–10.5)
POTASSIUM SERPL-SCNC: 4.3 MMOL/L (ref 3.5–5.1)
RBC # BLD AUTO: 2.89 M/UL (ref 4–5.2)
SODIUM SERPL-SCNC: 139 MMOL/L (ref 136–145)
WBC # BLD AUTO: 9.1 K/UL (ref 4–11)

## 2024-04-25 PROCEDURE — 80048 BASIC METABOLIC PNL TOTAL CA: CPT

## 2024-04-25 PROCEDURE — 2709999900 HC NON-CHARGEABLE SUPPLY: Performed by: INTERNAL MEDICINE

## 2024-04-25 PROCEDURE — 7100000010 HC PHASE II RECOVERY - FIRST 15 MIN: Performed by: INTERNAL MEDICINE

## 2024-04-25 PROCEDURE — 0DB68ZX EXCISION OF STOMACH, VIA NATURAL OR ARTIFICIAL OPENING ENDOSCOPIC, DIAGNOSTIC: ICD-10-PCS | Performed by: INTERNAL MEDICINE

## 2024-04-25 PROCEDURE — 3609012400 HC EGD TRANSORAL BIOPSY SINGLE/MULTIPLE: Performed by: INTERNAL MEDICINE

## 2024-04-25 PROCEDURE — 0DB98ZX EXCISION OF DUODENUM, VIA NATURAL OR ARTIFICIAL OPENING ENDOSCOPIC, DIAGNOSTIC: ICD-10-PCS | Performed by: INTERNAL MEDICINE

## 2024-04-25 PROCEDURE — 3700000000 HC ANESTHESIA ATTENDED CARE: Performed by: INTERNAL MEDICINE

## 2024-04-25 PROCEDURE — 7100000011 HC PHASE II RECOVERY - ADDTL 15 MIN: Performed by: INTERNAL MEDICINE

## 2024-04-25 PROCEDURE — 2500000003 HC RX 250 WO HCPCS: Performed by: NURSE ANESTHETIST, CERTIFIED REGISTERED

## 2024-04-25 PROCEDURE — 3609027000 HC COLONOSCOPY: Performed by: INTERNAL MEDICINE

## 2024-04-25 PROCEDURE — 6370000000 HC RX 637 (ALT 250 FOR IP): Performed by: INTERNAL MEDICINE

## 2024-04-25 PROCEDURE — 88305 TISSUE EXAM BY PATHOLOGIST: CPT

## 2024-04-25 PROCEDURE — 0DB78ZX EXCISION OF STOMACH, PYLORUS, VIA NATURAL OR ARTIFICIAL OPENING ENDOSCOPIC, DIAGNOSTIC: ICD-10-PCS | Performed by: INTERNAL MEDICINE

## 2024-04-25 PROCEDURE — 3700000001 HC ADD 15 MINUTES (ANESTHESIA): Performed by: INTERNAL MEDICINE

## 2024-04-25 PROCEDURE — 85025 COMPLETE CBC W/AUTO DIFF WBC: CPT

## 2024-04-25 PROCEDURE — 2580000003 HC RX 258: Performed by: INTERNAL MEDICINE

## 2024-04-25 PROCEDURE — 6360000002 HC RX W HCPCS: Performed by: NURSE ANESTHETIST, CERTIFIED REGISTERED

## 2024-04-25 PROCEDURE — 2580000003 HC RX 258: Performed by: NURSE ANESTHETIST, CERTIFIED REGISTERED

## 2024-04-25 RX ORDER — PROPOFOL 10 MG/ML
INJECTION, EMULSION INTRAVENOUS PRN
Status: DISCONTINUED | OUTPATIENT
Start: 2024-04-25 | End: 2024-04-25 | Stop reason: SDUPTHER

## 2024-04-25 RX ORDER — SODIUM CHLORIDE, SODIUM LACTATE, POTASSIUM CHLORIDE, CALCIUM CHLORIDE 600; 310; 30; 20 MG/100ML; MG/100ML; MG/100ML; MG/100ML
1000 INJECTION, SOLUTION INTRAVENOUS CONTINUOUS
Status: DISCONTINUED | OUTPATIENT
Start: 2024-04-25 | End: 2024-04-25

## 2024-04-25 RX ORDER — SODIUM CHLORIDE 9 MG/ML
INJECTION, SOLUTION INTRAVENOUS PRN
Status: DISCONTINUED | OUTPATIENT
Start: 2024-04-25 | End: 2024-04-25 | Stop reason: HOSPADM

## 2024-04-25 RX ORDER — CLONAZEPAM 0.5 MG/1
0.5 TABLET ORAL DAILY PRN
Qty: 3 TABLET | Refills: 0 | Status: SHIPPED | OUTPATIENT
Start: 2024-04-25 | End: 2024-04-28

## 2024-04-25 RX ORDER — NALOXONE HYDROCHLORIDE 0.4 MG/ML
INJECTION, SOLUTION INTRAMUSCULAR; INTRAVENOUS; SUBCUTANEOUS PRN
Status: DISCONTINUED | OUTPATIENT
Start: 2024-04-25 | End: 2024-04-25 | Stop reason: HOSPADM

## 2024-04-25 RX ORDER — AMLODIPINE BESYLATE 10 MG/1
10 TABLET ORAL DAILY
Qty: 30 TABLET | Refills: 3 | DISCHARGE
Start: 2024-04-26

## 2024-04-25 RX ORDER — IPRATROPIUM BROMIDE AND ALBUTEROL SULFATE 2.5; .5 MG/3ML; MG/3ML
1 SOLUTION RESPIRATORY (INHALATION)
Status: DISCONTINUED | OUTPATIENT
Start: 2024-04-25 | End: 2024-04-25 | Stop reason: HOSPADM

## 2024-04-25 RX ORDER — SODIUM CHLORIDE 0.9 % (FLUSH) 0.9 %
5-40 SYRINGE (ML) INJECTION PRN
Status: DISCONTINUED | OUTPATIENT
Start: 2024-04-25 | End: 2024-04-25 | Stop reason: HOSPADM

## 2024-04-25 RX ORDER — ONDANSETRON 2 MG/ML
4 INJECTION INTRAMUSCULAR; INTRAVENOUS
Status: DISCONTINUED | OUTPATIENT
Start: 2024-04-25 | End: 2024-04-25 | Stop reason: HOSPADM

## 2024-04-25 RX ORDER — SODIUM CHLORIDE, SODIUM LACTATE, POTASSIUM CHLORIDE, CALCIUM CHLORIDE 600; 310; 30; 20 MG/100ML; MG/100ML; MG/100ML; MG/100ML
INJECTION, SOLUTION INTRAVENOUS CONTINUOUS PRN
Status: DISCONTINUED | OUTPATIENT
Start: 2024-04-25 | End: 2024-04-25 | Stop reason: SDUPTHER

## 2024-04-25 RX ORDER — PANTOPRAZOLE SODIUM 40 MG/1
40 TABLET, DELAYED RELEASE ORAL 2 TIMES DAILY
Qty: 30 TABLET | Refills: 0 | DISCHARGE
Start: 2024-04-25

## 2024-04-25 RX ORDER — LIDOCAINE HYDROCHLORIDE 20 MG/ML
INJECTION, SOLUTION INFILTRATION; PERINEURAL PRN
Status: DISCONTINUED | OUTPATIENT
Start: 2024-04-25 | End: 2024-04-25 | Stop reason: SDUPTHER

## 2024-04-25 RX ORDER — OXYCODONE AND ACETAMINOPHEN 7.5; 325 MG/1; MG/1
1 TABLET ORAL EVERY 6 HOURS PRN
Qty: 12 TABLET | Refills: 0 | Status: SHIPPED | OUTPATIENT
Start: 2024-04-25 | End: 2024-04-28

## 2024-04-25 RX ORDER — SODIUM CHLORIDE 0.9 % (FLUSH) 0.9 %
5-40 SYRINGE (ML) INJECTION EVERY 12 HOURS SCHEDULED
Status: DISCONTINUED | OUTPATIENT
Start: 2024-04-25 | End: 2024-04-25 | Stop reason: HOSPADM

## 2024-04-25 RX ADMIN — PROPOFOL 100 MCG/KG/MIN: 10 INJECTION, EMULSION INTRAVENOUS at 08:45

## 2024-04-25 RX ADMIN — PROPOFOL 40 MG: 10 INJECTION, EMULSION INTRAVENOUS at 08:44

## 2024-04-25 RX ADMIN — AMLODIPINE BESYLATE 10 MG: 5 TABLET ORAL at 10:46

## 2024-04-25 RX ADMIN — SODIUM CHLORIDE, SODIUM LACTATE, POTASSIUM CHLORIDE, AND CALCIUM CHLORIDE: .6; .31; .03; .02 INJECTION, SOLUTION INTRAVENOUS at 08:33

## 2024-04-25 RX ADMIN — SODIUM CHLORIDE, POTASSIUM CHLORIDE, SODIUM LACTATE AND CALCIUM CHLORIDE 1000 ML: 600; 310; 30; 20 INJECTION, SOLUTION INTRAVENOUS at 07:15

## 2024-04-25 RX ADMIN — OXYCODONE AND ACETAMINOPHEN 1 TABLET: 7.5; 325 TABLET ORAL at 12:42

## 2024-04-25 RX ADMIN — LIDOCAINE HYDROCHLORIDE 60 MG: 20 INJECTION, SOLUTION INFILTRATION; PERINEURAL at 08:44

## 2024-04-25 RX ADMIN — PANTOPRAZOLE SODIUM 40 MG: 40 TABLET, DELAYED RELEASE ORAL at 10:46

## 2024-04-25 RX ADMIN — DEXTROSE MONOHYDRATE 250 ML: 100 INJECTION, SOLUTION INTRAVENOUS at 06:03

## 2024-04-25 ASSESSMENT — PAIN - FUNCTIONAL ASSESSMENT: PAIN_FUNCTIONAL_ASSESSMENT: 0-10

## 2024-04-25 ASSESSMENT — PAIN DESCRIPTION - ORIENTATION: ORIENTATION: LOWER

## 2024-04-25 ASSESSMENT — PAIN SCALES - GENERAL
PAINLEVEL_OUTOF10: 0
PAINLEVEL_OUTOF10: 0
PAINLEVEL_OUTOF10: 9
PAINLEVEL_OUTOF10: 0

## 2024-04-25 ASSESSMENT — PAIN DESCRIPTION - LOCATION: LOCATION: BACK

## 2024-04-25 ASSESSMENT — PAIN DESCRIPTION - DESCRIPTORS: DESCRIPTORS: ACHING

## 2024-04-25 NOTE — H&P
Gastroenterology Preop Assessment    Patient:   Agustina Fried   :    1960   Facility:   [facility]  Referring/PCP: Viridiana Blanco, APRN - NP  Date:     2024    Subjective:   Procedure:acute blood loss anemia    HPI/Reason for procedure:  egd/colonoscopy    Past Medical History:   Diagnosis Date    Abnormal brain MRI 2017    Partially empty sella and minimal chronic small vessel ischemic disease    Acute bilateral low back pain without sciatica 2016    SHARRON (acute kidney injury) ([facility]) 2017    Arthritis     back    Bipolar disorder ([facility]) 10/18/2008    CAD (coronary artery disease)     stent placed 20    Cancer ([facility])     bilateral breast:s/p lumpectomy/radiation:under care care of breast specialist:Dr. Boone     Carotid stenosis, bilateral:<50%:per US 2016 7/15/2016    Carpal tunnel syndrome 10/18/2008    Cervical cancer screening 2014    Nml per pt'.    Coronary artery disease of native artery of native heart with stable angina pectoris ([facility]) 2020    DDD (degenerative disc disease), lumbar 2018    Depression     under care of pschiatrist:Dr. 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 myelopathy or radiculopathy 3/10/2017    Transient cerebral ischemia 07/15/2016    TIA:7/10/16    Unspecified cerebral artery occlusion with  mg, Oral, Daily PRN, Ankur Douglas MD, 0.5 mg at 04/16/24 1741    insulin glargine (LANTUS) injection vial 8 Units, 8 Units, SubCUTAneous, Daily, Ankur Douglas MD, 8 Units at 04/24/24 1250    glucose chewable tablet 16 g, 4 tablet, Oral, PRN, Ankur Douglas MD, 16 g at 04/24/24 0809    dextrose bolus 10% 125 mL, 125 mL, IntraVENous, PRN **OR** dextrose bolus 10% 250 mL, 250 mL, IntraVENous, PRN, Ankur Douglas MD, Stopped at 04/25/24 0622    dextrose 10 % infusion, , IntraVENous, Continuous PRN, Ankur Douglas MD    atorvastatin (LIPITOR) tablet 40 mg, 40 mg, Oral, Nightly, Coty Schmitt MD, 40 mg at 04/24/24 1935    [Held by provider] isosorbide mononitrate (IMDUR) extended release tablet 30 mg, 30 mg, Oral, Daily, Glen Plascencia MD, 30 mg at 04/19/24 0850    oxyCODONE-acetaminophen (PERCOCET) 7.5-325 MG per tablet 1 tablet, 1 tablet, Oral, Q6H PRN, Coty Schmitt MD, 1 tablet at 04/24/24 1300    pantoprazole (PROTONIX) tablet 40 mg, 40 mg, Oral, Daily, Coty Schmitt MD, 40 mg at 04/24/24 0808    [Held by provider] traZODone (DESYREL) tablet 300 mg, 300 mg, Oral, Nightly, Coty Schmitt MD, 300 mg at 04/18/24 1950    potassium chloride 10 mEq/100 mL IVPB (Peripheral Line), 10 mEq, IntraVENous, PRN, Coty Schmitt MD    magnesium sulfate 2000 mg in 50 mL IVPB premix, 2,000 mg, IntraVENous, PRN, Coty Schmitt MD    sodium phosphate 15 mmol in sodium chloride 0.9 % 250 mL IVPB, 15 mmol, IntraVENous, PRN, Coty Schmitt MD    polyethylene glycol (GLYCOLAX) packet 17 g, 17 g, Oral, Daily PRN, Coty Schmitt MD    insulin lispro (HUMALOG) injection vial 0-8 Units, 0-8 Units, SubCUTAneous, TID WC, Coty Schmitt MD, 4 Units at 04/24/24 1251    insulin lispro (HUMALOG) injection vial 0-4 Units, 0-4 Units, SubCUTAneous, Nightly, Coty Schmitt MD, 4 Units at 04/23/24 2013    [Held by provider] hydrALAZINE (APRESOLINE) injection 10 mg, 10 mg, IntraVENous,

## 2024-04-25 NOTE — CARE COORDINATION
[facility] tentatively arranged for 3:35 PM. If patient is able to tolerate diet and BP better she can go. If not will cancel d/c. Pre-cert expires for [facility] 4/26.

## 2024-04-25 NOTE — PERIOP NOTE
Report obtained from inpatient RN - Updated VS/ POCT blood sugar (if applicable)  requested prior to procedure.   Return contact # provided to Rn for any changes or updates prior to procedure

## 2024-04-25 NOTE — PROGRESS NOTES
Dr Pedersen informed of glucose of 77. Patient is awake and alert with no complaints. She place her denture back in her mouth.

## 2024-04-25 NOTE — CARE COORDINATION
CASE MANAGEMENT DISCHARGE SUMMARY      Discharge to: [facility]     Precertification completed: yes  Hospital Exemption Notification (HENS) completed: yes    IMM given: (date) na    New Durable Medical Equipment ordered/agency: defer to SNF    Transportation:       Medical Transport explained to pt/family. Pt/family voice no agency preference.    Agency used:[facility]   time:3:35 PM   Ambulance form completed: Yes    Confirmed discharge plan with:md/rn/[facility]     Patient: yes     Family:  yes  rebuen Richards     Facility/Agency, name:  [facility]INO/SHERINE Anna in admissions at [facility] pulled from [employer]   Phone number for report to facility: [phone]     RN, name: Willis

## 2024-04-25 NOTE — PROGRESS NOTES
Pt back from procedures.  Pt BG is 49.  Snacks given and will continue to monitor.  Pt has no complaints at this time.

## 2024-04-25 NOTE — PROGRESS NOTES
Pt ready for discharge with squad.  Pt has no complaints at this time.  Pt's money obtained from security and forms signed.  Report called to [facility].  Pt is stable.

## 2024-04-25 NOTE — ANESTHESIA PRE PROCEDURE
abnormalities of gait and mobility R26.9   • Acute diarrhea R19.7   • Hypertensive urgency I16.0   • Acute renal failure superimposed on chronic kidney disease, unspecified acute renal failure type, unspecified CKD stage ([facility]) N17.9, N18.9   • Atypical chest pain R07.89   • Confusion R41.0   • Chronic kidney disease N18.9   • C. difficile colitis A04.72   • Encephalopathy, metabolic G93.41   • DM (diabetes mellitus), secondary, with complications ([facility]) E13.8   • Hyperosmolar hyperglycemic state (HHS) ([facility]) E11.00       Past Medical History:        Diagnosis Date   • Abnormal brain MRI 7/20/2017    Partially empty sella and minimal chronic small vessel ischemic disease   • Acute bilateral low back pain without sciatica 11/2/2016   • SHARRON (acute kidney injury) ([facility]) 7/5/2017   • Arthritis     back   • Bipolar disorder ([facility]) 10/18/2008   • CAD (coronary artery disease)     stent placed 6/8/20   • Cancer ([facility]) 2015    bilateral breast:s/p lumpectomy/radiation:under care care of breast specialist:Dr. Boone    • Carotid stenosis, bilateral:<50%:per US 7/2016 7/15/2016   • Carpal tunnel syndrome 10/18/2008   • Cervical cancer screening 2014    Nml per pt'.   • Coronary artery disease of native artery of native heart with stable angina pectoris ([facility]) 6/9/2020   • DDD (degenerative disc disease), lumbar 7/18/2018   • Depression     under care of pschiatrist:Dr. 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

## 2024-04-25 NOTE — DISCHARGE SUMMARY
Hospital Medicine Discharge Summary    Patient: Agustina Fried   : 1960     Admit Date: 2024   Discharge Date:    2024 =- provided tolerating diet / stable BS  BP  Disposition:  []Home   []HHC  [x]SNF  []ECF  []Acute Rehab  []LTAC  []Hospice  Code status:  [x]Full  []DNR/CCA  []Limited (DNR/CCA with Do Not Intubate)  []DNRCC  Condition at Discharge: Stable  Primary Care Provider: Viridiana Blanco, APRN - NP    Admitting Provider: Coty Schmitt MD  Discharge Provider: Ankur Douglas MD     Discharge Diagnoses:      Active Hospital Problems    Diagnosis     Hyperosmolar hyperglycemic state (HHS) (HCC) [E11.00]        Presenting Admission History:      Agustina Fried is a 64 y.o. female with pmh of type II DM, CAD, stage III CKD, who presents with not feeling well last several dose.  Her glucometer was reading high.  She gives history of excessive thirst and urination for last 2 days.  She also states her tongue started flailing and was not able to talk straight.  Per patient, she has been compliant with her insulin regimen at home.     When seen in the emergency room, she appears extremely dry.  She is not a very good historian.  Denies any abdominal pain, chest pain, dizziness or lightheadedness.     Workup in the emergency room revealed very high blood glucose of 1011 mg/DL, negative ketones and normal anion gap.     Assessment/Plan:      Type II DM  Presented with blood glucose of 1011 mg/DL  No anion gap, Negative ketones in urine  Received regular insulin 10 units in the emergency room  Has been taking 8 units nightly insulin and states she has been taking 4 units Premeal.  Questionable compliance  Obtain hemoglobin A1c in the morning.  Plan was to initiate on insulin drip and admitted to ICU.  However on repeat blood sugar check in the ED, sugars improving and currently in 400s.    started on Lantus 15 units nightly ( home dose )and medium dose sliding scale.- dc ed as of hypoglycemia  apply route daily  Qty: 100 each, Refills: 3      Insulin Pen Needle 32G X 4 MM MISC 1 each by Does not apply route daily  Qty: 100 each, Refills: 3      prazosin (MINIPRESS) 2 MG capsule Take 2 capsules by mouth 2 times daily  Qty: 120 capsule, Refills: 0      AUSTEDO 9 MG tablet Take 1 tablet by mouth daily      paliperidone (INVEGA) 9 MG extended release tablet Take 1 tablet by mouth every morning      calcium carbonate-vitamin D (CALTRATE) 600-400 MG-UNIT TABS per tab Take 1 tablet by mouth daily           Current Discharge Medication List        STOP taking these medications       cefdinir (OMNICEF) 300 MG capsule Comments:   Reason for Stopping:         insulin glargine (LANTUS SOLOSTAR) 100 UNIT/ML injection pen Comments:   Reason for Stopping:         gabapentin (NEURONTIN) 100 MG capsule Comments:   Reason for Stopping:         isosorbide mononitrate (IMDUR) 30 MG extended release tablet Comments:   Reason for Stopping:         aspirin 81 MG tablet Comments:   Reason for Stopping:               Significant Test Results    EGD    Result Date: 2024  [facility] Patient: JOHAN ANGULO MRN: [id number] : 1960 Account: [id number] Sex at Birth: Female Age: 64 Years Procedure: Upper GI endoscopy Date: 2024 Attending Physician: Damian Riley Indications:        -  Iron deficiency anemia Medications:        -  Monitored Anesthesia Care Complications:        -  No immediate complications. Estimated Blood Loss:        -  Estimated blood loss was minimal.        -  Estimated blood loss: none. Procedure:        - The gastroscope was introduced through the mouth and advanced to the second           part of the duodenum.        -  The patient tolerated the procedure well. Findings:        - One cratered duodenal ulcer was found in the second portion of the duodenum.            The lesion was 10 mm in largest dimension.  Biopsies were taken with a cold           forceps for histology.  The lesion

## 2024-04-25 NOTE — PROGRESS NOTES
Awake and alert with no complaints. VS stable. Meets PACU phase 2 discharge criteria. Bed and buttocks checked - incontinent of light brown stool - patient cleaned and linens changed. Ready to go to room.

## 2024-04-25 NOTE — ANESTHESIA POSTPROCEDURE EVALUATION
Department of Anesthesiology  Postprocedure Note    Patient: Agustina Fried  MRN: [id number]  YOB: 1960  Date of evaluation: 4/25/2024    Procedure Summary       Date: 04/25/24 Room / Location: [place] [address] / [facility]    Anesthesia Start: 0834 Anesthesia Stop: 0903    Procedures:       ESOPHAGOGASTRODUODENOSCOPY BIOPSY      COLONOSCOPY DIAGNOSTIC Diagnosis:       Anemia, unspecified type      (Anemia, unspecified type [D64.9])    Surgeons: Damian Riley DO Responsible Provider: Tyrell Elmore MD    Anesthesia Type: MAC ASA Status: 3            Anesthesia Type: No value filed.    Marnie Phase I: Marnie Score: 10    Marnie Phase II: Marnie Score: 10    Anesthesia Post Evaluation    Patient location during evaluation: PACU  Patient participation: complete - patient participated  Level of consciousness: awake and alert  Airway patency: patent  Nausea & Vomiting: no nausea and no vomiting  Cardiovascular status: hemodynamically stable  Respiratory status: acceptable  Hydration status: euvolemic  Pain management: adequate    No notable events documented.

## 2024-04-25 NOTE — PROGRESS NOTES
Ph: [phone], Fax: [phone]           [facility].[email or web address]               Reason for admission:                 Uncontrolled diabetes with hyperosmolar state    Brief Summary :     Agustina Fried is being seen by nephrology for SHARRON on CKD.  Initially admitted with uncontrolled diabetes and hyperosmolar state being admitted to ICU since got better in the ER itself  However course of hospitalization is complicated by variable blood pressure and SHARRON suspected to be ATN.  Also has significant drop of hemoglobin      Interval History and plan:     She has a tendency to have variable blood pressure  Today stable  On room air  Creatinine is significantly down to 1.9, EGFR 2020 mill per minute now  Her glucose was very low this morning  Hemoglobin 8.2  Getting EGD colonoscopy today  Potassium is now normal  Plan:  Okay to resume invega from renal perspective and may need to adjust the dose   At the time of discharge               Assessment :        Acute Kidney Injury on CKD stage IIIa/  Creatinine 2.7 at the time of consult-down from peak of 2.9  Appears to be due to rapid lowering of blood pressure  Baseline creatinine 1-1.3  History of chronic diarrhea due to colectomy/recurrent SHARRON in the past  Has reduced renal mass-nephrectomy due to Wilms tumor in the past also CKD contributed by diabetes  Renal imaging-surgical absence of right kidney  Echo 9/21-no abnormality mentioned    Hypertension   BP: (111-155)/(51-92)  Pulse:  [51-62]   BP goal inpatient 130-140 systolic inpatient  Blood pressure as high as 200 systolic on presentation and went down to 112/51           [facility] would like to thank Ankur Douglas MD   for opportunity to serve this patient      Please call with questions at-   24 Hrs Answering service [phone] or  7 am- 5 pm via Perfect serve or cell phone  Dr.Sudhir Temo MD       HPI :     Agustina Fried is a 64 y.o. female presented to   the hospital on 4/12/2024

## 2024-04-25 NOTE — PROGRESS NOTES
Received in PACU. Report received from endo nurse and CRNA. Sleeping with no response to verbal stimuli. Respirations easy. IV fluid rate increased for low BP - CRNA aware of BP.

## 2024-07-01 ENCOUNTER — HOSPITAL ENCOUNTER (INPATIENT)
Age: 64
LOS: 11 days | Discharge: NURSING, REHAB OR LONG-TERM CARE | DRG: 194 | End: 2024-07-12
Attending: STUDENT IN AN ORGANIZED HEALTH CARE EDUCATION/TRAINING PROGRAM | Admitting: STUDENT IN AN ORGANIZED HEALTH CARE EDUCATION/TRAINING PROGRAM
Payer: MEDICAID

## 2024-07-01 ENCOUNTER — TELEPHONE (OUTPATIENT)
Dept: ENDOCRINOLOGY | Age: 64
End: 2024-07-01

## 2024-07-01 ENCOUNTER — APPOINTMENT (OUTPATIENT)
Dept: GENERAL RADIOLOGY | Age: 64
DRG: 194 | End: 2024-07-01
Payer: MEDICAID

## 2024-07-01 DIAGNOSIS — I50.9 CONGESTIVE HEART FAILURE, UNSPECIFIED HF CHRONICITY, UNSPECIFIED HEART FAILURE TYPE (HCC): ICD-10-CM

## 2024-07-01 DIAGNOSIS — I50.9 ACUTE ON CHRONIC CONGESTIVE HEART FAILURE, UNSPECIFIED HEART FAILURE TYPE (HCC): Primary | ICD-10-CM

## 2024-07-01 DIAGNOSIS — N18.30 STAGE 3 CHRONIC KIDNEY DISEASE, UNSPECIFIED WHETHER STAGE 3A OR 3B CKD (HCC): ICD-10-CM

## 2024-07-01 DIAGNOSIS — R73.9 HYPERGLYCEMIA: ICD-10-CM

## 2024-07-01 DIAGNOSIS — R06.02 SHORTNESS OF BREATH: ICD-10-CM

## 2024-07-01 PROBLEM — I50.33 ACUTE ON CHRONIC HEART FAILURE WITH PRESERVED EJECTION FRACTION (HFPEF) (HCC): Status: ACTIVE | Noted: 2024-07-01

## 2024-07-01 LAB
ALBUMIN SERPL-MCNC: 2 G/DL (ref 3.4–5)
ALBUMIN/GLOB SERPL: 0.4 {RATIO} (ref 1.1–2.2)
ALP SERPL-CCNC: 204 U/L (ref 40–129)
ALT SERPL-CCNC: 23 U/L (ref 10–40)
ANION GAP SERPL CALCULATED.3IONS-SCNC: 11 MMOL/L (ref 3–16)
AST SERPL-CCNC: 25 U/L (ref 15–37)
BACTERIA URNS QL MICRO: ABNORMAL /HPF
BASE EXCESS BLDV CALC-SCNC: -5.2 MMOL/L (ref -3–3)
BASOPHILS # BLD: 0 K/UL (ref 0–0.2)
BASOPHILS NFR BLD: 0.4 %
BILIRUB SERPL-MCNC: <0.2 MG/DL (ref 0–1)
BILIRUB UR QL STRIP.AUTO: NEGATIVE
BUN SERPL-MCNC: 23 MG/DL (ref 7–20)
CALCIUM SERPL-MCNC: 7.2 MG/DL (ref 8.3–10.6)
CHLORIDE SERPL-SCNC: 100 MMOL/L (ref 99–110)
CLARITY UR: CLEAR
CO2 BLDV-SCNC: 19 MMOL/L
CO2 SERPL-SCNC: 18 MMOL/L (ref 21–32)
COHGB MFR BLDV: 3.4 % (ref 0–1.5)
COLOR UR: ABNORMAL
CREAT SERPL-MCNC: 2 MG/DL (ref 0.6–1.2)
DEPRECATED RDW RBC AUTO: 15.6 % (ref 12.4–15.4)
EOSINOPHIL # BLD: 0 K/UL (ref 0–0.6)
EOSINOPHIL NFR BLD: 0.3 %
GFR SERPLBLD CREATININE-BSD FMLA CKD-EPI: 27 ML/MIN/{1.73_M2}
GLUCOSE BLD-MCNC: 433 MG/DL (ref 70–99)
GLUCOSE BLD-MCNC: >600 MG/DL (ref 70–99)
GLUCOSE SERPL-MCNC: 636 MG/DL (ref 70–99)
GLUCOSE UR STRIP.AUTO-MCNC: >=1000 MG/DL
HCO3 BLDV-SCNC: 18.4 MMOL/L (ref 23–29)
HCT VFR BLD AUTO: 27.7 % (ref 36–48)
HGB BLD-MCNC: 8.4 G/DL (ref 12–16)
HGB UR QL STRIP.AUTO: ABNORMAL
KETONES UR STRIP.AUTO-MCNC: NEGATIVE MG/DL
LACTATE BLDV-SCNC: 2.3 MMOL/L (ref 0.4–1.9)
LACTATE BLDV-SCNC: 4.2 MMOL/L (ref 0.4–1.9)
LEUKOCYTE ESTERASE UR QL STRIP.AUTO: NEGATIVE
LIPASE SERPL-CCNC: 5 U/L (ref 13–60)
LYMPHOCYTES # BLD: 0.9 K/UL (ref 1–5.1)
LYMPHOCYTES NFR BLD: 13.3 %
MCH RBC QN AUTO: 28.1 PG (ref 26–34)
MCHC RBC AUTO-ENTMCNC: 30.4 G/DL (ref 31–36)
MCV RBC AUTO: 92.2 FL (ref 80–100)
METHGB MFR BLDV: 0.1 %
MONOCYTES # BLD: 0.4 K/UL (ref 0–1.3)
MONOCYTES NFR BLD: 5.2 %
NEUTROPHILS # BLD: 5.7 K/UL (ref 1.7–7.7)
NEUTROPHILS NFR BLD: 80.8 %
NITRITE UR QL STRIP.AUTO: NEGATIVE
NT-PROBNP SERPL-MCNC: 2076 PG/ML (ref 0–124)
O2 THERAPY: ABNORMAL
PCO2 BLDV: 29.2 MMHG (ref 40–50)
PERFORMED ON: ABNORMAL
PERFORMED ON: ABNORMAL
PH BLDV: 7.42 [PH] (ref 7.35–7.45)
PH UR STRIP.AUTO: 6 [PH] (ref 5–8)
PLATELET # BLD AUTO: 177 K/UL (ref 135–450)
PMV BLD AUTO: 9.4 FL (ref 5–10.5)
PO2 BLDV: 104.3 MMHG (ref 25–40)
POTASSIUM SERPL-SCNC: 4.4 MMOL/L (ref 3.5–5.1)
PROT SERPL-MCNC: 6.8 G/DL (ref 6.4–8.2)
PROT UR STRIP.AUTO-MCNC: 30 MG/DL
RBC # BLD AUTO: 3 M/UL (ref 4–5.2)
RBC #/AREA URNS HPF: ABNORMAL /HPF (ref 0–4)
SAO2 % BLDV: 98 %
SODIUM SERPL-SCNC: 129 MMOL/L (ref 136–145)
SP GR UR STRIP.AUTO: 1.01 (ref 1–1.03)
TROPONIN, HIGH SENSITIVITY: 75 NG/L (ref 0–14)
TROPONIN, HIGH SENSITIVITY: 81 NG/L (ref 0–14)
UA COMPLETE W REFLEX CULTURE PNL UR: YES
UA DIPSTICK W REFLEX MICRO PNL UR: YES
URN SPEC COLLECT METH UR: ABNORMAL
UROBILINOGEN UR STRIP-ACNC: 0.2 E.U./DL
WBC # BLD AUTO: 7.1 K/UL (ref 4–11)
WBC #/AREA URNS HPF: ABNORMAL /HPF (ref 0–5)

## 2024-07-01 PROCEDURE — 6370000000 HC RX 637 (ALT 250 FOR IP): Performed by: STUDENT IN AN ORGANIZED HEALTH CARE EDUCATION/TRAINING PROGRAM

## 2024-07-01 PROCEDURE — 2060000000 HC ICU INTERMEDIATE R&B

## 2024-07-01 PROCEDURE — 6360000002 HC RX W HCPCS: Performed by: STUDENT IN AN ORGANIZED HEALTH CARE EDUCATION/TRAINING PROGRAM

## 2024-07-01 PROCEDURE — 81001 URINALYSIS AUTO W/SCOPE: CPT

## 2024-07-01 PROCEDURE — 82010 KETONE BODYS QUAN: CPT

## 2024-07-01 PROCEDURE — 1200000000 HC SEMI PRIVATE

## 2024-07-01 PROCEDURE — 83690 ASSAY OF LIPASE: CPT

## 2024-07-01 PROCEDURE — 87186 SC STD MICRODIL/AGAR DIL: CPT

## 2024-07-01 PROCEDURE — 83880 ASSAY OF NATRIURETIC PEPTIDE: CPT

## 2024-07-01 PROCEDURE — 82803 BLOOD GASES ANY COMBINATION: CPT

## 2024-07-01 PROCEDURE — 83605 ASSAY OF LACTIC ACID: CPT

## 2024-07-01 PROCEDURE — 80053 COMPREHEN METABOLIC PANEL: CPT

## 2024-07-01 PROCEDURE — 84484 ASSAY OF TROPONIN QUANT: CPT

## 2024-07-01 PROCEDURE — 93005 ELECTROCARDIOGRAM TRACING: CPT | Performed by: STUDENT IN AN ORGANIZED HEALTH CARE EDUCATION/TRAINING PROGRAM

## 2024-07-01 PROCEDURE — 85025 COMPLETE CBC W/AUTO DIFF WBC: CPT

## 2024-07-01 PROCEDURE — 99285 EMERGENCY DEPT VISIT HI MDM: CPT

## 2024-07-01 PROCEDURE — 87088 URINE BACTERIA CULTURE: CPT

## 2024-07-01 PROCEDURE — 71045 X-RAY EXAM CHEST 1 VIEW: CPT

## 2024-07-01 PROCEDURE — 87086 URINE CULTURE/COLONY COUNT: CPT

## 2024-07-01 RX ORDER — FUROSEMIDE 10 MG/ML
40 INJECTION INTRAMUSCULAR; INTRAVENOUS 2 TIMES DAILY
Status: DISCONTINUED | OUTPATIENT
Start: 2024-07-02 | End: 2024-07-02

## 2024-07-01 RX ORDER — SODIUM CHLORIDE 9 MG/ML
INJECTION, SOLUTION INTRAVENOUS PRN
Status: DISCONTINUED | OUTPATIENT
Start: 2024-07-01 | End: 2024-07-08 | Stop reason: SDUPTHER

## 2024-07-01 RX ORDER — POLYETHYLENE GLYCOL 3350 17 G/17G
17 POWDER, FOR SOLUTION ORAL DAILY PRN
Status: DISCONTINUED | OUTPATIENT
Start: 2024-07-01 | End: 2024-07-12 | Stop reason: HOSPADM

## 2024-07-01 RX ORDER — INSULIN LISPRO 100 [IU]/ML
3 INJECTION, SOLUTION INTRAVENOUS; SUBCUTANEOUS
Status: DISCONTINUED | OUTPATIENT
Start: 2024-07-02 | End: 2024-07-02

## 2024-07-01 RX ORDER — ACETAMINOPHEN 650 MG/1
650 SUPPOSITORY RECTAL EVERY 6 HOURS PRN
Status: DISCONTINUED | OUTPATIENT
Start: 2024-07-01 | End: 2024-07-12 | Stop reason: HOSPADM

## 2024-07-01 RX ORDER — ATORVASTATIN CALCIUM 40 MG/1
40 TABLET, FILM COATED ORAL NIGHTLY
Status: DISCONTINUED | OUTPATIENT
Start: 2024-07-02 | End: 2024-07-12 | Stop reason: HOSPADM

## 2024-07-01 RX ORDER — 0.9 % SODIUM CHLORIDE 0.9 %
500 INTRAVENOUS SOLUTION INTRAVENOUS ONCE
Status: COMPLETED | OUTPATIENT
Start: 2024-07-02 | End: 2024-07-02

## 2024-07-01 RX ORDER — FUROSEMIDE 10 MG/ML
20 INJECTION INTRAMUSCULAR; INTRAVENOUS ONCE
Status: COMPLETED | OUTPATIENT
Start: 2024-07-01 | End: 2024-07-01

## 2024-07-01 RX ORDER — ONDANSETRON 2 MG/ML
4 INJECTION INTRAMUSCULAR; INTRAVENOUS EVERY 6 HOURS PRN
Status: DISCONTINUED | OUTPATIENT
Start: 2024-07-01 | End: 2024-07-12 | Stop reason: HOSPADM

## 2024-07-01 RX ORDER — ONDANSETRON 4 MG/1
4 TABLET, ORALLY DISINTEGRATING ORAL EVERY 8 HOURS PRN
Status: DISCONTINUED | OUTPATIENT
Start: 2024-07-01 | End: 2024-07-12 | Stop reason: HOSPADM

## 2024-07-01 RX ORDER — SODIUM CHLORIDE 0.9 % (FLUSH) 0.9 %
5-40 SYRINGE (ML) INJECTION EVERY 12 HOURS SCHEDULED
Status: DISCONTINUED | OUTPATIENT
Start: 2024-07-02 | End: 2024-07-12 | Stop reason: HOSPADM

## 2024-07-01 RX ORDER — SODIUM CHLORIDE 0.9 % (FLUSH) 0.9 %
5-40 SYRINGE (ML) INJECTION PRN
Status: DISCONTINUED | OUTPATIENT
Start: 2024-07-01 | End: 2024-07-12 | Stop reason: HOSPADM

## 2024-07-01 RX ORDER — INSULIN LISPRO 100 [IU]/ML
0-4 INJECTION, SOLUTION INTRAVENOUS; SUBCUTANEOUS NIGHTLY
Status: DISCONTINUED | OUTPATIENT
Start: 2024-07-02 | End: 2024-07-02

## 2024-07-01 RX ORDER — ENOXAPARIN SODIUM 100 MG/ML
30 INJECTION SUBCUTANEOUS DAILY
Status: DISCONTINUED | OUTPATIENT
Start: 2024-07-02 | End: 2024-07-12 | Stop reason: HOSPADM

## 2024-07-01 RX ORDER — PANTOPRAZOLE SODIUM 40 MG/1
40 TABLET, DELAYED RELEASE ORAL 2 TIMES DAILY
Status: DISCONTINUED | OUTPATIENT
Start: 2024-07-02 | End: 2024-07-08

## 2024-07-01 RX ORDER — INSULIN LISPRO 100 [IU]/ML
0-4 INJECTION, SOLUTION INTRAVENOUS; SUBCUTANEOUS
Status: DISCONTINUED | OUTPATIENT
Start: 2024-07-02 | End: 2024-07-02

## 2024-07-01 RX ORDER — ACETAMINOPHEN 325 MG/1
650 TABLET ORAL EVERY 6 HOURS PRN
Status: DISCONTINUED | OUTPATIENT
Start: 2024-07-01 | End: 2024-07-12 | Stop reason: HOSPADM

## 2024-07-01 RX ORDER — INSULIN GLARGINE 100 [IU]/ML
8 INJECTION, SOLUTION SUBCUTANEOUS DAILY
Status: DISCONTINUED | OUTPATIENT
Start: 2024-07-02 | End: 2024-07-03

## 2024-07-01 RX ORDER — AMLODIPINE BESYLATE 5 MG/1
10 TABLET ORAL DAILY
Status: DISCONTINUED | OUTPATIENT
Start: 2024-07-02 | End: 2024-07-03

## 2024-07-01 RX ORDER — TRAZODONE HYDROCHLORIDE 50 MG/1
100 TABLET ORAL NIGHTLY
Status: DISCONTINUED | OUTPATIENT
Start: 2024-07-02 | End: 2024-07-12 | Stop reason: HOSPADM

## 2024-07-01 RX ORDER — PALIPERIDONE 3 MG/1
9 TABLET, EXTENDED RELEASE ORAL EVERY MORNING
Status: DISCONTINUED | OUTPATIENT
Start: 2024-07-02 | End: 2024-07-12 | Stop reason: HOSPADM

## 2024-07-01 RX ADMIN — FUROSEMIDE 20 MG: 10 INJECTION, SOLUTION INTRAMUSCULAR; INTRAVENOUS at 22:47

## 2024-07-01 RX ADMIN — INSULIN HUMAN 7 UNITS: 100 INJECTION, SOLUTION PARENTERAL at 22:48

## 2024-07-01 ASSESSMENT — PAIN - FUNCTIONAL ASSESSMENT: PAIN_FUNCTIONAL_ASSESSMENT: NONE - DENIES PAIN

## 2024-07-01 NOTE — TELEPHONE ENCOUNTER
Call from [facility] on [facility]  Requesting refills on pen needles    CB# [phone] - Cassandra @ [facility]

## 2024-07-02 ENCOUNTER — APPOINTMENT (OUTPATIENT)
Age: 64
DRG: 194 | End: 2024-07-02
Attending: STUDENT IN AN ORGANIZED HEALTH CARE EDUCATION/TRAINING PROGRAM
Payer: MEDICAID

## 2024-07-02 LAB
ALBUMIN SERPL-MCNC: 2.2 G/DL (ref 3.4–5)
ALBUMIN/GLOB SERPL: 0.5 {RATIO} (ref 1.1–2.2)
ALP SERPL-CCNC: 185 U/L (ref 40–129)
ALT SERPL-CCNC: 20 U/L (ref 10–40)
ANION GAP SERPL CALCULATED.3IONS-SCNC: 7 MMOL/L (ref 3–16)
AST SERPL-CCNC: 19 U/L (ref 15–37)
BASOPHILS # BLD: 0.1 K/UL (ref 0–0.2)
BASOPHILS NFR BLD: 0.8 %
BETA-HYDROXYBUTYRATE: 0.14 MMOL/L (ref 0–0.27)
BILIRUB SERPL-MCNC: <0.2 MG/DL (ref 0–1)
BUN SERPL-MCNC: 23 MG/DL (ref 7–20)
CALCIUM SERPL-MCNC: 7.4 MG/DL (ref 8.3–10.6)
CHLORIDE SERPL-SCNC: 103 MMOL/L (ref 99–110)
CO2 SERPL-SCNC: 25 MMOL/L (ref 21–32)
CREAT SERPL-MCNC: 1.9 MG/DL (ref 0.6–1.2)
DEPRECATED RDW RBC AUTO: 14.5 % (ref 12.4–15.4)
ECHO BSA: 1.67 M2
ECHO EST RA PRESSURE: 3 MMHG
ECHO IVC PROX: 1.5 CM
ECHO LV EDV A4C: 95 ML
ECHO LV EDV INDEX A4C: 57 ML/M2
ECHO LV EJECTION FRACTION A4C: 64 %
ECHO LV ESV A4C: 34 ML
ECHO LV ESV INDEX A4C: 20 ML/M2
ECHO PV MAX VELOCITY: 1.2 M/S
ECHO PV PEAK GRADIENT: 6 MMHG
ECHO RA AREA 4C: 14.1 CM2
ECHO RA END SYSTOLIC VOLUME APICAL 4 CHAMBER INDEX BSA: 23 ML/M2
ECHO RA VOLUME: 38 ML
ECHO RIGHT VENTRICULAR SYSTOLIC PRESSURE (RVSP): 30 MMHG
ECHO RV BASAL DIMENSION: 4.1 CM
ECHO RV FREE WALL PEAK S': 11 CM/S
ECHO RV LONGITUDINAL DIMENSION: 7.1 CM
ECHO RV MID DIMENSION: 3.6 CM
ECHO RV TAPSE: 1.9 CM (ref 1.7–?)
ECHO TV REGURGITANT MAX VELOCITY: 2.58 M/S
ECHO TV REGURGITANT PEAK GRADIENT: 27 MMHG
EKG ATRIAL RATE: 84 BPM
EKG DIAGNOSIS: NORMAL
EKG P AXIS: 68 DEGREES
EKG P-R INTERVAL: 138 MS
EKG Q-T INTERVAL: 394 MS
EKG QRS DURATION: 76 MS
EKG QTC CALCULATION (BAZETT): 465 MS
EKG R AXIS: 1 DEGREES
EKG T AXIS: 49 DEGREES
EKG VENTRICULAR RATE: 84 BPM
EOSINOPHIL # BLD: 0.1 K/UL (ref 0–0.6)
EOSINOPHIL NFR BLD: 1.2 %
GFR SERPLBLD CREATININE-BSD FMLA CKD-EPI: 29 ML/MIN/{1.73_M2}
GLUCOSE BLD-MCNC: 190 MG/DL (ref 70–99)
GLUCOSE BLD-MCNC: 210 MG/DL (ref 70–99)
GLUCOSE BLD-MCNC: 255 MG/DL (ref 70–99)
GLUCOSE BLD-MCNC: 266 MG/DL (ref 70–99)
GLUCOSE BLD-MCNC: 304 MG/DL (ref 70–99)
GLUCOSE SERPL-MCNC: 280 MG/DL (ref 70–99)
HCT VFR BLD AUTO: 22.5 % (ref 36–48)
HGB BLD-MCNC: 7.4 G/DL (ref 12–16)
LACTATE BLDV-SCNC: 1 MMOL/L (ref 0.4–2)
LYMPHOCYTES # BLD: 1.5 K/UL (ref 1–5.1)
LYMPHOCYTES NFR BLD: 21.5 %
MCH RBC QN AUTO: 28.6 PG (ref 26–34)
MCHC RBC AUTO-ENTMCNC: 32.7 G/DL (ref 31–36)
MCV RBC AUTO: 87.3 FL (ref 80–100)
MONOCYTES # BLD: 0.6 K/UL (ref 0–1.3)
MONOCYTES NFR BLD: 8.7 %
NEUTROPHILS # BLD: 4.8 K/UL (ref 1.7–7.7)
NEUTROPHILS NFR BLD: 67.8 %
NT-PROBNP SERPL-MCNC: 1345 PG/ML (ref 0–124)
PERFORMED ON: ABNORMAL
PLATELET # BLD AUTO: 178 K/UL (ref 135–450)
PMV BLD AUTO: 10 FL (ref 5–10.5)
POTASSIUM SERPL-SCNC: 4.3 MMOL/L (ref 3.5–5.1)
PROT SERPL-MCNC: 6.5 G/DL (ref 6.4–8.2)
RBC # BLD AUTO: 2.58 M/UL (ref 4–5.2)
SODIUM SERPL-SCNC: 135 MMOL/L (ref 136–145)
TROPONIN, HIGH SENSITIVITY: 76 NG/L (ref 0–14)
WBC # BLD AUTO: 7.1 K/UL (ref 4–11)

## 2024-07-02 PROCEDURE — 6370000000 HC RX 637 (ALT 250 FOR IP): Performed by: NURSE PRACTITIONER

## 2024-07-02 PROCEDURE — 93308 TTE F-UP OR LMTD: CPT

## 2024-07-02 PROCEDURE — 83036 HEMOGLOBIN GLYCOSYLATED A1C: CPT

## 2024-07-02 PROCEDURE — 2580000003 HC RX 258: Performed by: STUDENT IN AN ORGANIZED HEALTH CARE EDUCATION/TRAINING PROGRAM

## 2024-07-02 PROCEDURE — 99254 IP/OBS CNSLTJ NEW/EST MOD 60: CPT | Performed by: INTERNAL MEDICINE

## 2024-07-02 PROCEDURE — 80053 COMPREHEN METABOLIC PANEL: CPT

## 2024-07-02 PROCEDURE — 2060000000 HC ICU INTERMEDIATE R&B

## 2024-07-02 PROCEDURE — 36415 COLL VENOUS BLD VENIPUNCTURE: CPT

## 2024-07-02 PROCEDURE — 6370000000 HC RX 637 (ALT 250 FOR IP): Performed by: STUDENT IN AN ORGANIZED HEALTH CARE EDUCATION/TRAINING PROGRAM

## 2024-07-02 PROCEDURE — 84484 ASSAY OF TROPONIN QUANT: CPT

## 2024-07-02 PROCEDURE — 6360000002 HC RX W HCPCS: Performed by: STUDENT IN AN ORGANIZED HEALTH CARE EDUCATION/TRAINING PROGRAM

## 2024-07-02 PROCEDURE — 93325 DOPPLER ECHO COLOR FLOW MAPG: CPT | Performed by: INTERNAL MEDICINE

## 2024-07-02 PROCEDURE — 6360000002 HC RX W HCPCS: Performed by: INTERNAL MEDICINE

## 2024-07-02 PROCEDURE — 93321 DOPPLER ECHO F-UP/LMTD STD: CPT | Performed by: INTERNAL MEDICINE

## 2024-07-02 PROCEDURE — 93308 TTE F-UP OR LMTD: CPT | Performed by: INTERNAL MEDICINE

## 2024-07-02 PROCEDURE — 83605 ASSAY OF LACTIC ACID: CPT

## 2024-07-02 PROCEDURE — 6370000000 HC RX 637 (ALT 250 FOR IP): Performed by: INTERNAL MEDICINE

## 2024-07-02 PROCEDURE — 83880 ASSAY OF NATRIURETIC PEPTIDE: CPT

## 2024-07-02 PROCEDURE — 93010 ELECTROCARDIOGRAM REPORT: CPT | Performed by: INTERNAL MEDICINE

## 2024-07-02 PROCEDURE — 85025 COMPLETE CBC W/AUTO DIFF WBC: CPT

## 2024-07-02 RX ORDER — INSULIN LISPRO 100 [IU]/ML
4 INJECTION, SOLUTION INTRAVENOUS; SUBCUTANEOUS
Status: DISCONTINUED | OUTPATIENT
Start: 2024-07-02 | End: 2024-07-08

## 2024-07-02 RX ORDER — OXYCODONE AND ACETAMINOPHEN 7.5; 325 MG/1; MG/1
1 TABLET ORAL EVERY 4 HOURS PRN
Status: DISCONTINUED | OUTPATIENT
Start: 2024-07-02 | End: 2024-07-12 | Stop reason: HOSPADM

## 2024-07-02 RX ORDER — DEXTROSE MONOHYDRATE 100 MG/ML
INJECTION, SOLUTION INTRAVENOUS CONTINUOUS PRN
Status: DISCONTINUED | OUTPATIENT
Start: 2024-07-02 | End: 2024-07-08 | Stop reason: SDUPTHER

## 2024-07-02 RX ORDER — INSULIN LISPRO 100 [IU]/ML
0-4 INJECTION, SOLUTION INTRAVENOUS; SUBCUTANEOUS NIGHTLY
Status: DISCONTINUED | OUTPATIENT
Start: 2024-07-02 | End: 2024-07-02

## 2024-07-02 RX ORDER — GLUCAGON 1 MG/ML
1 KIT INJECTION PRN
Status: DISCONTINUED | OUTPATIENT
Start: 2024-07-02 | End: 2024-07-08 | Stop reason: SDUPTHER

## 2024-07-02 RX ORDER — GABAPENTIN 300 MG/1
300 CAPSULE ORAL 2 TIMES DAILY
Status: DISCONTINUED | OUTPATIENT
Start: 2024-07-02 | End: 2024-07-08

## 2024-07-02 RX ORDER — LIDOCAINE 4 G/G
1 PATCH TOPICAL DAILY
Status: DISCONTINUED | OUTPATIENT
Start: 2024-07-02 | End: 2024-07-12 | Stop reason: HOSPADM

## 2024-07-02 RX ORDER — INSULIN LISPRO 100 [IU]/ML
0-16 INJECTION, SOLUTION INTRAVENOUS; SUBCUTANEOUS
Status: DISCONTINUED | OUTPATIENT
Start: 2024-07-02 | End: 2024-07-12 | Stop reason: HOSPADM

## 2024-07-02 RX ORDER — FUROSEMIDE 10 MG/ML
40 INJECTION INTRAMUSCULAR; INTRAVENOUS ONCE
Status: COMPLETED | OUTPATIENT
Start: 2024-07-02 | End: 2024-07-02

## 2024-07-02 RX ADMIN — INSULIN LISPRO 4 UNITS: 100 INJECTION, SOLUTION INTRAVENOUS; SUBCUTANEOUS at 17:19

## 2024-07-02 RX ADMIN — SODIUM CHLORIDE, PRESERVATIVE FREE 10 ML: 5 INJECTION INTRAVENOUS at 20:32

## 2024-07-02 RX ADMIN — INSULIN LISPRO 2 UNITS: 100 INJECTION, SOLUTION INTRAVENOUS; SUBCUTANEOUS at 12:14

## 2024-07-02 RX ADMIN — OXYCODONE AND ACETAMINOPHEN 1 TABLET: 7.5; 325 TABLET ORAL at 20:32

## 2024-07-02 RX ADMIN — INSULIN LISPRO 2 UNITS: 100 INJECTION, SOLUTION INTRAVENOUS; SUBCUTANEOUS at 09:33

## 2024-07-02 RX ADMIN — SODIUM CHLORIDE 500 ML: 9 INJECTION, SOLUTION INTRAVENOUS at 00:10

## 2024-07-02 RX ADMIN — ATORVASTATIN CALCIUM 40 MG: 40 TABLET, FILM COATED ORAL at 00:11

## 2024-07-02 RX ADMIN — TRAZODONE HYDROCHLORIDE 100 MG: 50 TABLET ORAL at 00:11

## 2024-07-02 RX ADMIN — PALIPERIDONE 9 MG: 3 TABLET, EXTENDED RELEASE ORAL at 09:35

## 2024-07-02 RX ADMIN — PANTOPRAZOLE SODIUM 40 MG: 40 TABLET, DELAYED RELEASE ORAL at 12:15

## 2024-07-02 RX ADMIN — INSULIN LISPRO 3 UNITS: 100 INJECTION, SOLUTION INTRAVENOUS; SUBCUTANEOUS at 09:34

## 2024-07-02 RX ADMIN — ATORVASTATIN CALCIUM 40 MG: 40 TABLET, FILM COATED ORAL at 20:32

## 2024-07-02 RX ADMIN — ENOXAPARIN SODIUM 30 MG: 100 INJECTION SUBCUTANEOUS at 09:33

## 2024-07-02 RX ADMIN — FUROSEMIDE 40 MG: 10 INJECTION, SOLUTION INTRAMUSCULAR; INTRAVENOUS at 12:15

## 2024-07-02 RX ADMIN — INSULIN GLARGINE 8 UNITS: 100 INJECTION, SOLUTION SUBCUTANEOUS at 09:33

## 2024-07-02 RX ADMIN — PANTOPRAZOLE SODIUM 40 MG: 40 TABLET, DELAYED RELEASE ORAL at 09:33

## 2024-07-02 RX ADMIN — TRAZODONE HYDROCHLORIDE 100 MG: 50 TABLET ORAL at 20:32

## 2024-07-02 RX ADMIN — INSULIN LISPRO 3 UNITS: 100 INJECTION, SOLUTION INTRAVENOUS; SUBCUTANEOUS at 12:15

## 2024-07-02 RX ADMIN — SODIUM CHLORIDE, PRESERVATIVE FREE 10 ML: 5 INJECTION INTRAVENOUS at 09:35

## 2024-07-02 RX ADMIN — GABAPENTIN 300 MG: 300 CAPSULE ORAL at 20:32

## 2024-07-02 RX ADMIN — AMLODIPINE BESYLATE 10 MG: 5 TABLET ORAL at 09:33

## 2024-07-02 ASSESSMENT — PAIN DESCRIPTION - PAIN TYPE
TYPE: CHRONIC PAIN
TYPE: CHRONIC PAIN

## 2024-07-02 ASSESSMENT — PAIN - FUNCTIONAL ASSESSMENT: PAIN_FUNCTIONAL_ASSESSMENT: PREVENTS OR INTERFERES SOME ACTIVE ACTIVITIES AND ADLS

## 2024-07-02 ASSESSMENT — PAIN DESCRIPTION - LOCATION
LOCATION: BACK

## 2024-07-02 ASSESSMENT — PAIN DESCRIPTION - DESCRIPTORS
DESCRIPTORS: ACHING
DESCRIPTORS: ACHING;DISCOMFORT

## 2024-07-02 ASSESSMENT — PAIN SCALES - GENERAL
PAINLEVEL_OUTOF10: 10

## 2024-07-02 ASSESSMENT — PAIN DESCRIPTION - ORIENTATION: ORIENTATION: MID;LOWER

## 2024-07-02 NOTE — ED PROVIDER NOTES
Emergency Department Provider Note  Location: [facility]  ED  7/1/2024     Patient Identification  Agustina Fried is a 64 y.o. female    Chief Complaint  Shortness of Breath and Hyperglycemia (Pt called EMS for sob, EMS checked BG and it was reading too high for the monitor. According to pt she took 10units of insulin around an hr ago. Type 2 diabetic )      Mode of Arrival  EMS    HPI  (History provided by patient)  This is a 64 y.o. female with a PMH significant for DM, CKD III, HTN, CHF  presented today for shortness of breath and hyperglycemia.  Patient called EMS for shortness of breath.  When they checked her blood sugar it was reading too high.  According to patient she took 10 units of insulin roughly an hour prior to arrival.  She is endorsing polyuria, polydipsia.  She denies any cough or fever.  Denies any nausea or vomiting.  Denies any abdominal pain.  She denies any dysuria or hematuria.  Denies any diarrhea or bloody stool.  She reports she has been compliant on her medications.    ROS  Review of Systems   Respiratory:  Positive for shortness of breath.    Endocrine: Positive for polydipsia and polyuria.   All other systems reviewed and are negative.        I have reviewed the following nursing documentation:  Allergies:   Allergies   Allergen Reactions    Morphine Anaphylaxis and Hives     feels like throat is closing    Penicillins Hives and Swelling    Codeine Hives and Rash       Past medical history:  has a past medical history of Abnormal brain MRI (7/20/2017), Acute bilateral low back pain without sciatica (11/2/2016), SHARRON (acute kidney injury) ([facility]) (7/5/2017), Arthritis, Bipolar disorder ([facility]) (10/18/2008), CAD (coronary artery disease), Cancer ([facility]) (2015), Carotid stenosis, bilateral:<50%:per US 7/2016 (7/15/2016), Carpal tunnel syndrome (10/18/2008), Cervical cancer screening (2014), Coronary artery disease of native artery of native heart with stable angina pectoris ([facility])  rhythm with a rate of 84, normal QRS, QTc of 465, PACs no STEMI   - CXR increasing small right pleural effusion with mild right basilar atelectasis   - Lab:   CBC within normal white blood cell count, normocytic anemia which is stable with a hemoglobin of 8.4, hematocrit of 27.7, normal platelets  VBG with normal pH, low pCO2 at 29.2  CMP with significant hyperglycemia to 636, pseudohyponatremia to 129, low CO2 at 18, elevated BUN at 23, creatinine of 2, GFR of 27, elevated alk phos to 204 with normal ALT, AST and bilirubin, renal function baseline  Elevated proBNP to 2076  Elevated troponin to 81    Exclusion criteria - the patient is NOT to be included for SEP-1 Core Measure due to: 2+ SIRS criteria are not met      - I discussed the results with patient.  We agreed to admit for acute CHF exacerbation and hyperglycemia        Clinical Impression:  1. Acute on chronic congestive heart failure, unspecified heart failure type (HCC)    2. Shortness of breath    3. Hyperglycemia    4. Stage 3 chronic kidney disease, unspecified whether stage 3a or 3b CKD (HCC)          Disposition:  Admit to med/surg floor in stable condition.    Blood pressure (!) 164/60, pulse 77, temperature 98.1 °F (36.7 °C), temperature source Oral, resp. rate 18, height 1.575 m (5' 2\"), weight 63.5 kg (140 lb), SpO2 99 %, not currently breastfeeding.    Patient was given scripts for the following medications. I counseled patient how to take these medications.   New Prescriptions    No medications on file       Disposition referral (if applicable):  No follow-up provider specified.      Total critical care time is 39 minutes, which excludes separately billable procedures and updating family. Time spent is specifically for management of the presenting complaint and symptoms initially, direct bedside care, reevaluation, review of records, and consultation.  There was a high probability of clinically significant life-threatening deterioration in the

## 2024-07-02 NOTE — PLAN OF CARE
Problem: Skin/Tissue Integrity  Goal: Absence of new skin breakdown  Description: 1.  Monitor for areas of redness and/or skin breakdown  2.  Assess vascular access sites hourly  3.  Every 4-6 hours minimum:  Change oxygen saturation probe site  4.  Every 4-6 hours:  If on nasal continuous positive airway pressure, respiratory therapy assess nares and determine need for appliance change or resting period.  Outcome: Progressing     Problem: Pain  Goal: Verbalizes/displays adequate comfort level or baseline comfort level  Outcome: Progressing     Problem: Chronic Conditions and Co-morbidities  Goal: Patient's chronic conditions and co-morbidity symptoms are monitored and maintained or improved  Outcome: Progressing

## 2024-07-02 NOTE — CONSULTS
Ph: [phone], Fax: [phone]           [facility]               Reason for admission:                 Hyperglycemia, fluid overload       Interval History and plan:      Has high creatinine  However is stable since last hospitalization  Has edema and got IV Lasix yesterday and today  Glucose coming down  proBNP was 2076  Watch electrolytes while on IV Lasix  Continue antihypertensives                Assessment :     CKD  1.9 at the time of consult  1.9 on 4/25/2024 at the time of last hospital discharge  Creatinine peaked to 3.1 last hospitalization    Hypertension   BP: (157-169)/(66-76)  Pulse:  [83-89]   BP goal inpatient 130-140 systolic inpatient        Hyponatremia  Sodium 129 on presentation 130  Going up appropriately  Has history of recurrent hyponatremia                  [facility] would like to thank Roger Lubin MD   for opportunity to serve this patient      Please call with questions at-   24 Hrs Answering service [phone] or  7 am- 5 pm via Perfect serve or cell phone  Dr.Sudhir Temo MD       HPI :     Agustina Fried is a 64 y.o. female presented to   the hospital on 7/1/2024 with severe hyperglycemia, also complaining of the legs.  She got a dose of Lasix at the time of hospitalization.  She was actually sent to the hospital because of very high glucose.  She is known to us for CKD .  Consulted for the same        PMH/PSH/SH/Family History:     Past Medical History:   Diagnosis Date    Abnormal brain MRI 7/20/2017    Partially empty sella and minimal chronic small vessel ischemic disease    Acute bilateral low back pain without sciatica 11/2/2016    SHARRON (acute kidney injury) ([facility]) 7/5/2017    Arthritis     back    Bipolar disorder ([facility]) 10/18/2008    CAD (coronary artery disease)     stent placed 6/8/20    Cancer ([facility]) 2015    bilateral breast:s/p lumpectomy/radiation:under care care of breast specialist:Dr. Boone     Carotid stenosis, bilateral:<50%:per  day  sodium chloride flush 0.9 % injection 5-40 mL, 5-40 mL, IntraVENous, PRN  0.9 % sodium chloride infusion, , IntraVENous, PRN  enoxaparin Sodium (LOVENOX) injection 30 mg, 30 mg, SubCUTAneous, Daily  ondansetron (ZOFRAN-ODT) disintegrating tablet 4 mg, 4 mg, Oral, Q8H PRN **OR** ondansetron (ZOFRAN) injection 4 mg, 4 mg, IntraVENous, Q6H PRN  polyethylene glycol (GLYCOLAX) packet 17 g, 17 g, Oral, Daily PRN  acetaminophen (TYLENOL) tablet 650 mg, 650 mg, Oral, Q6H PRN **OR** acetaminophen (TYLENOL) suppository 650 mg, 650 mg, Rectal, Q6H PRN  perflutren lipid microspheres (DEFINITY) injection 1.5 mL, 1.5 mL, IntraVENous, ONCE PRN  insulin lispro (HUMALOG,ADMELOG) injection vial 3 Units, 3 Units, SubCUTAneous, TID WC    Vitals :     Vitals:    07/02/24 0830   BP: (!) 169/76   Pulse: 83   Resp: 18   Temp: 98.4 °F (36.9 °C)   SpO2: 98%          Physical Examination :     appearance: Alert, orientated  Respiratory: no distress  Cardiovascular: no visibly  raised JVD, Edema 2- 3+   Abdomen: -  soft  Other relevant findings: -      Labs :     CBC:   Recent Labs     07/01/24 2111 07/02/24  0423   WBC 7.1 7.1   HGB 8.4* 7.4*   HCT 27.7* 22.5*    178     BMP:    Recent Labs     07/01/24 2111 07/02/24  0423   * 135*   K 4.4 4.3    103   CO2 18* 25   BUN 23* 23*   CREATININE 2.0* 1.9*   GLUCOSE 636* 280*     Lab Results   Component Value Date/Time    COLORU Straw 07/01/2024 11:04 PM    NITRU Negative 07/01/2024 11:04 PM    GLUCOSEU >=1000 07/01/2024 11:04 PM    GLUCOSEU >=1000 mg/dL 06/07/2010 03:38 PM    KETUA Negative 07/01/2024 11:04 PM    UROBILINOGEN 0.2 07/01/2024 11:04 PM    BILIRUBINUR Negative 07/01/2024 11:04 PM        ----------------------------------------------------------  Please call with questions at      24 Hrs Answering service [phone]  Perfect serve, or cell phone 7 am - 5pm  Glen Plascencia MD   [email or web address]

## 2024-07-02 NOTE — ED NOTES
Primary RN and charge RN both attempted to place peripheral IV access with three unsuccessful attempts. MD notified

## 2024-07-02 NOTE — PLAN OF CARE
CHF Care Plan      Patient's EF (Ejection Fraction) is greater than 40%    Heart Failure Medications:  Diuretics:: Furosemide    (One of the following REQUIRED for EF </= 40%/SYSTOLIC FAILURE but MAY be used in EF% >40%/DIASTOLIC FAILURE)        ACE:: None        ARB:: None         ARNI:: None    (Beta Blockers)  NON- Evidenced Based Beta Blocker (for EF% >40%/DIASTOLIC FAILURE): None    Evidenced Based Beta Blocker::(REQUIRED for EF% <40%/SYSTOLIC FAILURE) None  ...................................................................................................................................................    Failed to redirect to the Timeline version of the [employer] SmartLink.      Patient's weights and intake/output reviewed    Daily Weight log at bedside, patient/family participation in use of log: \"yes    Patient's current weight today shows a difference of 4 lbs more than last documented weight.      Intake/Output Summary (Last 24 hours) at 7/2/2024 1701  Last data filed at 7/2/2024 1649  Gross per 24 hour   Intake 680 ml   Output 800 ml   Net -120 ml       Education Booklet Provided: yes    Comorbidities Reviewed Yes    Patient has a past medical history of Abnormal brain MRI, Acute bilateral low back pain without sciatica, SHARRON (acute kidney injury) ([facility]), Arthritis, Bipolar disorder ([facility]), CAD (coronary artery disease), Cancer (HCC), Carotid stenosis, bilateral:<50%:per US 7/2016, Carpal tunnel syndrome, Cervical cancer screening, Coronary artery disease of native artery of native heart with stable angina pectoris (HCC), DDD (degenerative disc disease), lumbar, Depression, Depression/anxiety, Depression/anxiety, Diabetes mellitus (HCC), Gout, History of mammogram, History of therapeutic radiation, Hyperlipidemia, Hypertension, 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     >>For CHF and Comorbidity documentation on Education Time and Topics, please see Education Tab      Pt resting in bed at this time on room air. Pt with complaints of shortness of breath. Pt with pitting lower extremity edema.     Patient and/or Family's stated Goal of Care this Admission: reduce shortness of breath, increase activity tolerance, better understand heart failure and disease management, be more comfortable, and reduce lower extremity edema prior to discharge        :

## 2024-07-02 NOTE — PROGRESS NOTES
V2.0    [facility] Progress Note      Name:  Agustina Fried /Age/Sex: 1960  (64 y.o. female)   MRN & CSN:  [id number] & [id number] Encounter Date/Time: 2024 12:50 PM EDT   Location:   PCP: Viridiana Blanco APRN - NP     Attending:Roger Lubin MD       Hospital Day: 2    Assessment and Recommendations   Agustina Fried is a 64 y.o. female with a pmh of hypertension, diabetes mellitus, GERD, hyperlipidemia, who presents with Hyperglycemia due to diabetes mellitus (HCC)     Acute on chronic diastolic heart failure  DM 2, uncontrolled with hyperglycemia  Lactic acidosis  Hypertension  Hyperlipidemia  GERD  Chronic anemia  CKD stage IIIb    Plan:   Patient received a dose of Lasix yesterday 20 mg IV, renal functions are stable will give another dose of 40 mg IV daily and will consult nephrology to monitor his CKD while being on diuresis and closely monitor electrolytes and renal functions  Daily weight is  Strict I's and O's  Will adjust insulin continue Lantus but will add mealtime bolus of 5 unit 3 times daily along with high sliding scale  Continue other home medication for her comorbidities  Labs in a.m.              Comment: Please note this report has been produced using speech recognition software and may contain errors related to that system including errors in grammar, punctuation, and spelling, as well as words and phrases that may be inappropriate. If there are any questions or concerns please feel free to contact the dictating provider for clarification.   Diet ADULT DIET; Regular; 4 carb choices (60 gm/meal); No Added Salt (3-4 gm); 2000 ml   DVT Prophylaxis [x] Lovenox, []  Heparin, [x] SCDs, [] Ambulation,  [] Eliquis, [] Xarelto   Code Status Full Code   Disposition From: Home  Expected Disposition:  Home  Estimated Date of Discharge:      Surrogate Decision Maker/ POA       Personally reviewed Lab Studies and Imaging     Discussed management of the case with     Imaging that was  Negative 07/01/2024 11:04 PM    COLORU Straw 07/01/2024 11:04 PM    PHUR 6.0 07/01/2024 11:04 PM    PHUR 5.5 04/18/2024 04:40 PM    WBCUA 21-50 07/01/2024 11:04 PM    RBCUA 11-20 07/01/2024 11:04 PM    YEAST Present 12/10/2023 02:53 PM    BACTERIA Rare 07/01/2024 11:04 PM    CLARITYU Clear 07/01/2024 11:04 PM    SPECGRAV 1.010 01/06/2013 12:11 AM    LEUKOCYTESUR Negative 07/01/2024 11:04 PM    UROBILINOGEN 0.2 07/01/2024 11:04 PM    BILIRUBINUR Negative 07/01/2024 11:04 PM    BLOODU MODERATE 07/01/2024 11:04 PM    GLUCOSEU >=1000 07/01/2024 11:04 PM    GLUCOSEU >=1000 mg/dL 06/07/2010 03:38 PM    KETUA Negative 07/01/2024 11:04 PM    AMORPHOUS 3+ 04/18/2024 04:40 PM     Urine Cultures:   Lab Results   Component Value Date/Time    LABURIN >100,000 CFU/ml 01/18/2024 04:29 PM     Blood Cultures:   Lab Results   Component Value Date/Time    BC No Growth after 4 days of incubation. 04/17/2024 06:19 AM     Lab Results   Component Value Date/Time    BLOODCULT2 No Growth after 4 days of incubation. 04/17/2024 06:19 AM     Organism:   Lab Results   Component Value Date/Time    ORG Proteus mirabilis 01/18/2024 04:29 PM         Electronically signed by Roger Lubin MD on 7/2/2024 at 12:50 PM

## 2024-07-02 NOTE — DISCHARGE INSTRUCTIONS
Heart Failure Resources:  Heart Failure Interactive Workbook:  Go to [email or web address]/publication/?x=987243 for a Free Heart Failure Interactive Workbook provided by The American Heart Association. This interactive workbook will provide information on Healthier Living with Heart Failure. Please copy and paste link into search bar. Use your mouse to scroll through the pages.    HF Remus cecille:   Heart Failure Free smart phone cecille available for iPhone and Android download. Use your phone to track sodium intake, fluid intake, symptoms, and weight.     Low Sodium Diet / Recipes:  Go to www.[employer] website for “renal” diet which is Low Sodium! [employer] is a dialysis company, but this website offers free seasonal cookbooks. Each quarter, they will release 25-30 new recipes with a breakdown of calories, sodium, and glucose. You can also go to www.[employer]/recipes website for free recipes.     Home Exercise Program:   Identification of Green/Yellow/Red zones:  You should be able to identify when you feel good (green zone), if you have 1-2 symptoms of HF (yellow zone), or if you are in need of medical attention (red zone).  In your CHF education folder you were provided a “stop light tool” to outline this information.     We want to you to rate your exertion levels:    Our therapy team has discussed means of identification with you such as the \"Elpidio scale.\"  The Elpidio rating scale ranges from 6 to 20, where 6 means \"no exertion at all\" and 20 means \"maximal exertion.\" The goal is to use this to gauge how much effort it is taking for you to do your normal daily tasks.   You should be able to recognize when too much exertion is being expended.    Elements of Energy Conservation:   Prioritize/Plan: Decide what needs to be done today, and what can wait for a later date, write to do lists, plan ahead to avoid extra trips, and gather supplies and equipment needed before starting an activity.   Position:

## 2024-07-02 NOTE — H&P
V2.0  History and Physical      Name:  Agustina Fried /Age/Sex: 1960  (64 y.o. female)   MRN & CSN:  [id number] & [id number] Encounter Date/Time: 2024 10:31 PM EDT   Location:   PCP: Viridiana Blanco APRN - NP       Hospital Day: 1    Assessment and Plan:   Agustina Fried is a 64 y.o. female with a pmh of hypertension, diabetes mellitus, GERD, hyperlipidemia, who presents with Hyperglycemia due to diabetes mellitus ([facility])    Hospital Problems             Last Modified POA    * (Principal) Acute on chronic heart failure with preserved ejection fraction (HFpEF) ([facility]) 2024 Yes       # Hyperglycemia secondary to uncontrolled diabetes mellitus: Presented with fatigue, tiredness since couple of days, blood glucose on presentation greater than 600, anion gap normal, started on IV fluids, as boluses Lantus, lispro, sliding scale insulin, hypoglycemic protocol and diabetic diet, last A1c 2412.5%.    # Acute on chronic diastolic heart failure: Pedal edema, last echo  showed EF 50 to 55%, received Lasix 1 dose, due to hyper glycemia discontinue Lasix monitor closely for fluids continue continue to monitor.    # Lactic acidosis continue IV fluids as boluses and repeat lactic acid    # Hypertension  # Hyperlipidemia  # GERD  # Chronic anemia stable  -Continue home medication if no contraindication    Comment: Please note this report has been produced using speech recognition software and may contain errors related to that system including errors in grammar, punctuation, and spelling, as well as words and phrases that may be inappropriate. If there are any questions or concerns please feel free to contact the dictating provider for clarification.       Disposition:   Current Living situation: Home  Expected Disposition: Home  Estimated D/C: TBD    Diet ADULT DIET; Regular; 4 carb choices (60 gm/meal); No Added Salt (3-4 gm); 2000 ml   DVT Prophylaxis [x] Lovenox, []  Heparin, [] SCDs, [] Ambulation,  []

## 2024-07-02 NOTE — CONSULTS
[facility]   Cardiovascular Evaluation    PATIENT: Agustina Fried  DATE: 2024  MRN: [id number]  CSN: [id number]  : 1960    Primary Care Doctor/Referring provider: Viridiana Blanco APRN - POONAM, Regis Hahn MD     Reason for evaluation/Chief complaint:   Shortness of Breath and Hyperglycemia (Pt called EMS for sob, EMS checked BG and it was reading too high for the monitor. According to pt she took 10units of insulin around an hr ago. Type 2 diabetic )      Subjective:    History of present illness on initial date of evaluation:   Agustina Fried is a 64 y.o. patient who presents for evaluation of shortness of breath.  The patient is a poor historian and cannot provide history of present illness.  Majority information is taken from reviewing the chart and medical staff.  The patient was admitted to the hospital yesterday with uncontrolled hyperglycemia and acute on chronic diastolic heart failure.  Cardiology has been asked to provide further management of the patient's cardiovascular status with concerns of elevated troponin levels.   The patient denies any current chest pain.  She states that she does get short of breath with exertion.  She does complain of overall generalized pain and requesting pain medication.         Patient Active Problem List   Diagnosis    Acute hyperglycemia    Elevated troponin    HTN (hypertension), benign    Bilateral malignant neoplasm of breast in female (HCC)    Microalbuminuria    Obesity (BMI 30-39.9)    Slurred speech    Hx of ischemic CVA    Brain metastases    Carotid stenosis, bilateral:<50%:per US 2016    SHARRON (acute kidney injury) (HCC)    Lactic acidosis    Frequent falls    Depression/anxiety    Abnormal brain MRI    Acute bilateral low back pain without sciatica    Closed fracture of right ankle, with routine healing, subsequent encounter    Stage 3a chronic kidney disease (HCC)    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

## 2024-07-02 NOTE — PROGRESS NOTES
Chiqui Shore NP    Patient admitted for Hyperglycemia. Patient has chronic back pain and is requesting her home medicaitons of Percocet 7.5-325 mg Q 6 hrs and Gabapentin 600 mg TID.  Thank you, Cinthya

## 2024-07-03 LAB
ANION GAP SERPL CALCULATED.3IONS-SCNC: 10 MMOL/L (ref 3–16)
BUN SERPL-MCNC: 20 MG/DL (ref 7–20)
CALCIUM SERPL-MCNC: 7.6 MG/DL (ref 8.3–10.6)
CHLORIDE SERPL-SCNC: 102 MMOL/L (ref 99–110)
CO2 SERPL-SCNC: 21 MMOL/L (ref 21–32)
CREAT SERPL-MCNC: 1.8 MG/DL (ref 0.6–1.2)
EST. AVERAGE GLUCOSE BLD GHB EST-MCNC: 240.3 MG/DL
GFR SERPLBLD CREATININE-BSD FMLA CKD-EPI: 31 ML/MIN/{1.73_M2}
GLUCOSE BLD-MCNC: 116 MG/DL (ref 70–99)
GLUCOSE BLD-MCNC: 55 MG/DL (ref 70–99)
GLUCOSE BLD-MCNC: 61 MG/DL (ref 70–99)
GLUCOSE BLD-MCNC: 89 MG/DL (ref 70–99)
GLUCOSE SERPL-MCNC: 104 MG/DL (ref 70–99)
HBA1C MFR BLD: 10 %
NT-PROBNP SERPL-MCNC: 989 PG/ML (ref 0–124)
PERFORMED ON: ABNORMAL
PERFORMED ON: NORMAL
POTASSIUM SERPL-SCNC: 3.6 MMOL/L (ref 3.5–5.1)
SODIUM SERPL-SCNC: 133 MMOL/L (ref 136–145)

## 2024-07-03 PROCEDURE — 6370000000 HC RX 637 (ALT 250 FOR IP): Performed by: INTERNAL MEDICINE

## 2024-07-03 PROCEDURE — 2580000003 HC RX 258: Performed by: STUDENT IN AN ORGANIZED HEALTH CARE EDUCATION/TRAINING PROGRAM

## 2024-07-03 PROCEDURE — 2580000003 HC RX 258: Performed by: INTERNAL MEDICINE

## 2024-07-03 PROCEDURE — 6370000000 HC RX 637 (ALT 250 FOR IP): Performed by: STUDENT IN AN ORGANIZED HEALTH CARE EDUCATION/TRAINING PROGRAM

## 2024-07-03 PROCEDURE — 99232 SBSQ HOSP IP/OBS MODERATE 35: CPT | Performed by: STUDENT IN AN ORGANIZED HEALTH CARE EDUCATION/TRAINING PROGRAM

## 2024-07-03 PROCEDURE — 6370000000 HC RX 637 (ALT 250 FOR IP): Performed by: NURSE PRACTITIONER

## 2024-07-03 PROCEDURE — 2060000000 HC ICU INTERMEDIATE R&B

## 2024-07-03 PROCEDURE — 80048 BASIC METABOLIC PNL TOTAL CA: CPT

## 2024-07-03 PROCEDURE — 36415 COLL VENOUS BLD VENIPUNCTURE: CPT

## 2024-07-03 PROCEDURE — 83880 ASSAY OF NATRIURETIC PEPTIDE: CPT

## 2024-07-03 PROCEDURE — 6360000002 HC RX W HCPCS: Performed by: STUDENT IN AN ORGANIZED HEALTH CARE EDUCATION/TRAINING PROGRAM

## 2024-07-03 PROCEDURE — 6360000002 HC RX W HCPCS: Performed by: INTERNAL MEDICINE

## 2024-07-03 RX ORDER — INSULIN GLARGINE 100 [IU]/ML
5 INJECTION, SOLUTION SUBCUTANEOUS DAILY
Status: DISCONTINUED | OUTPATIENT
Start: 2024-07-04 | End: 2024-07-05

## 2024-07-03 RX ORDER — AMLODIPINE BESYLATE 5 MG/1
5 TABLET ORAL DAILY
Status: DISCONTINUED | OUTPATIENT
Start: 2024-07-04 | End: 2024-07-11

## 2024-07-03 RX ORDER — FUROSEMIDE 10 MG/ML
40 INJECTION INTRAMUSCULAR; INTRAVENOUS ONCE
Status: COMPLETED | OUTPATIENT
Start: 2024-07-03 | End: 2024-07-03

## 2024-07-03 RX ADMIN — INSULIN LISPRO 4 UNITS: 100 INJECTION, SOLUTION INTRAVENOUS; SUBCUTANEOUS at 12:11

## 2024-07-03 RX ADMIN — SODIUM CHLORIDE, PRESERVATIVE FREE 10 ML: 5 INJECTION INTRAVENOUS at 22:15

## 2024-07-03 RX ADMIN — GABAPENTIN 300 MG: 300 CAPSULE ORAL at 22:14

## 2024-07-03 RX ADMIN — INSULIN GLARGINE 8 UNITS: 100 INJECTION, SOLUTION SUBCUTANEOUS at 08:39

## 2024-07-03 RX ADMIN — ENOXAPARIN SODIUM 30 MG: 100 INJECTION SUBCUTANEOUS at 08:38

## 2024-07-03 RX ADMIN — PANTOPRAZOLE SODIUM 40 MG: 40 TABLET, DELAYED RELEASE ORAL at 08:39

## 2024-07-03 RX ADMIN — FUROSEMIDE 40 MG: 10 INJECTION, SOLUTION INTRAMUSCULAR; INTRAVENOUS at 12:11

## 2024-07-03 RX ADMIN — AMLODIPINE BESYLATE 10 MG: 5 TABLET ORAL at 08:39

## 2024-07-03 RX ADMIN — GLUCAGON 1 MG: 1 INJECTION, POWDER, LYOPHILIZED, FOR SOLUTION INTRAMUSCULAR; INTRAVENOUS at 16:22

## 2024-07-03 RX ADMIN — ATORVASTATIN CALCIUM 40 MG: 40 TABLET, FILM COATED ORAL at 22:14

## 2024-07-03 RX ADMIN — GABAPENTIN 300 MG: 300 CAPSULE ORAL at 08:39

## 2024-07-03 RX ADMIN — TRAZODONE HYDROCHLORIDE 100 MG: 50 TABLET ORAL at 22:14

## 2024-07-03 RX ADMIN — OXYCODONE AND ACETAMINOPHEN 1 TABLET: 7.5; 325 TABLET ORAL at 12:18

## 2024-07-03 RX ADMIN — OXYCODONE AND ACETAMINOPHEN 1 TABLET: 7.5; 325 TABLET ORAL at 19:56

## 2024-07-03 RX ADMIN — PANTOPRAZOLE SODIUM 40 MG: 40 TABLET, DELAYED RELEASE ORAL at 12:18

## 2024-07-03 RX ADMIN — SODIUM CHLORIDE, PRESERVATIVE FREE 10 ML: 5 INJECTION INTRAVENOUS at 08:40

## 2024-07-03 RX ADMIN — PALIPERIDONE 9 MG: 3 TABLET, EXTENDED RELEASE ORAL at 08:39

## 2024-07-03 RX ADMIN — INSULIN LISPRO 4 UNITS: 100 INJECTION, SOLUTION INTRAVENOUS; SUBCUTANEOUS at 08:39

## 2024-07-03 RX ADMIN — MEROPENEM 1000 MG: 1 INJECTION, POWDER, FOR SOLUTION INTRAVENOUS at 22:41

## 2024-07-03 ASSESSMENT — PAIN DESCRIPTION - DESCRIPTORS: DESCRIPTORS: ACHING

## 2024-07-03 ASSESSMENT — PAIN DESCRIPTION - ORIENTATION: ORIENTATION: MID;LOWER;RIGHT

## 2024-07-03 ASSESSMENT — PAIN SCALES - GENERAL
PAINLEVEL_OUTOF10: 8
PAINLEVEL_OUTOF10: 0
PAINLEVEL_OUTOF10: 7

## 2024-07-03 ASSESSMENT — PAIN DESCRIPTION - LOCATION: LOCATION: BACK

## 2024-07-03 NOTE — PLAN OF CARE
CHF Care Plan      Patient's EF (Ejection Fraction) is greater than 40%    Heart Failure Medications:  Diuretics:: Furosemide    (One of the following REQUIRED for EF </= 40%/SYSTOLIC FAILURE but MAY be used in EF% >40%/DIASTOLIC FAILURE)        ACE:: None        ARB:: None         ARNI:: None    (Beta Blockers)  NON- Evidenced Based Beta Blocker (for EF% >40%/DIASTOLIC FAILURE): None    Evidenced Based Beta Blocker::(REQUIRED for EF% <40%/SYSTOLIC FAILURE) None  ...................................................................................................................................................    Failed to redirect to the Timeline version of the [employer] SmartLink.      Patient's weights and intake/output reviewed    Daily Weight log at bedside, patient/family participation in use of log: \"yes    Patient's current weight today shows a difference of 5 lbs less than last documented weight.      Intake/Output Summary (Last 24 hours) at 7/3/2024 1428  Last data filed at 7/3/2024 0929  Gross per 24 hour   Intake 726 ml   Output 300 ml   Net 426 ml       Education Booklet Provided: yes    Comorbidities Reviewed Yes    Patient has a past medical history of Abnormal brain MRI, Acute bilateral low back pain without sciatica, SHARRON (acute kidney injury) ([facility]), Arthritis, Bipolar disorder ([facility]), CAD (coronary artery disease), Cancer (HCC), Carotid stenosis, bilateral:<50%:per US 7/2016, Carpal tunnel syndrome, Cervical cancer screening, Coronary artery disease of native artery of native heart with stable angina pectoris (HCC), DDD (degenerative disc disease), lumbar, Depression, Depression/anxiety, Depression/anxiety, Diabetes mellitus (HCC), Gout, History of mammogram, History of therapeutic radiation, Hyperlipidemia, Hypertension, 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

## 2024-07-03 NOTE — PROGRESS NOTES
Assessment completed and medications given. Patient is A&Ox4 and denies any needs at this time. Call light and personal belongings are within reach. Standard safety measures in place.

## 2024-07-03 NOTE — PROGRESS NOTES
chloride flush  5-40 mL IntraVENous 2 times per day    enoxaparin  30 mg SubCUTAneous Daily      Infusions:    dextrose      sodium chloride       PRN Meds: glucose, 4 tablet, PRN  dextrose bolus, 125 mL, PRN   Or  dextrose bolus, 250 mL, PRN  glucagon (rDNA), 1 mg, PRN  dextrose, , Continuous PRN  oxyCODONE-acetaminophen, 1 tablet, Q4H PRN  sodium chloride flush, 5-40 mL, PRN  sodium chloride, , PRN  ondansetron, 4 mg, Q8H PRN   Or  ondansetron, 4 mg, Q6H PRN  polyethylene glycol, 17 g, Daily PRN  acetaminophen, 650 mg, Q6H PRN   Or  acetaminophen, 650 mg, Q6H PRN  perflutren lipid microspheres, 1.5 mL, ONCE PRN        Labs and Imaging   XR CHEST PORTABLE    Result Date: 7/1/2024  EXAMINATION: ONE XRAY VIEW OF THE CHEST 7/1/2024 9:25 pm COMPARISON: 04/17/2024 HISTORY: ORDERING SYSTEM PROVIDED HISTORY: shortness of breath TECHNOLOGIST PROVIDED HISTORY: Reason for exam:->shortness of breath Reason for Exam: sob FINDINGS: Increasing small right pleural effusion with right basilar atelectasis.  No pneumothorax.  Left lung clear. Heart size is normal.     Increasing small right pleural effusion with mild right basilar atelectasis.       CBC:   Recent Labs     07/01/24 2111 07/02/24 0423   WBC 7.1 7.1   HGB 8.4* 7.4*    178       BMP:    Recent Labs     07/01/24 2111 07/02/24 0423 07/03/24  0853   * 135* 133*   K 4.4 4.3 3.6    103 102   CO2 18* 25 21   BUN 23* 23* 20   CREATININE 2.0* 1.9* 1.8*   GLUCOSE 636* 280* 104*       Hepatic:   Recent Labs     07/01/24 2111 07/02/24 0423   AST 25 19   ALT 23 20   BILITOT <0.2 <0.2   ALKPHOS 204* 185*       Lipids:   Lab Results   Component Value Date/Time    CHOL 154 11/27/2023 07:00 PM    HDL 75 11/27/2023 07:00 PM    TRIG 89 11/27/2023 07:00 PM     Hemoglobin A1C:   Lab Results   Component Value Date/Time    LABA1C 10.0 07/02/2024 10:37 AM     TSH:   Lab Results   Component Value Date/Time    TSH 0.86 12/11/2022 04:45 AM     Troponin: No results found  for: \"TROPONINT\"  Lactic Acid:   Recent Labs     07/02/24  0826   LACTA 1.0       BNP:   Recent Labs     07/01/24  2111 07/02/24  1040 07/03/24  0853   PROBNP 2,076* 1,345* 989*       UA:  Lab Results   Component Value Date/Time    NITRU Negative 07/01/2024 11:04 PM    COLORU Straw 07/01/2024 11:04 PM    PHUR 6.0 07/01/2024 11:04 PM    PHUR 5.5 04/18/2024 04:40 PM    WBCUA 21-50 07/01/2024 11:04 PM    RBCUA 11-20 07/01/2024 11:04 PM    YEAST Present 12/10/2023 02:53 PM    BACTERIA Rare 07/01/2024 11:04 PM    CLARITYU Clear 07/01/2024 11:04 PM    SPECGRAV 1.010 01/06/2013 12:11 AM    LEUKOCYTESUR Negative 07/01/2024 11:04 PM    UROBILINOGEN 0.2 07/01/2024 11:04 PM    BILIRUBINUR Negative 07/01/2024 11:04 PM    BLOODU MODERATE 07/01/2024 11:04 PM    GLUCOSEU >=1000 07/01/2024 11:04 PM    GLUCOSEU >=1000 mg/dL 06/07/2010 03:38 PM    KETUA Negative 07/01/2024 11:04 PM    AMORPHOUS 3+ 04/18/2024 04:40 PM     Urine Cultures:   Lab Results   Component Value Date/Time    LABURIN  07/01/2024 11:04 PM     >100,000 CFU/ml  Additional Sensitivity to follow  CONTACT PRECAUTIONS INDICATED  Carbapenem antibiotics are the best option for infections caused  by ESBL producing organisms.  Other antibiotic classes are  likely to result in treatment failure, even for organisms  demonstrating in vitro susceptibility.      LABURIN <10,000 CFU/ml  No further workup   07/01/2024 11:04 PM     Blood Cultures:   Lab Results   Component Value Date/Time    BC No Growth after 4 days of incubation. 04/17/2024 06:19 AM     Lab Results   Component Value Date/Time    BLOODCULT2 No Growth after 4 days of incubation. 04/17/2024 06:19 AM     Organism:   Lab Results   Component Value Date/Time    ORG Escherichia coli ESBL 07/01/2024 11:04 PM    ORG Proteus species 07/01/2024 11:04 PM         Electronically signed by Roger Lubin MD on 7/3/2024 at 4:56 PM

## 2024-07-03 NOTE — PLAN OF CARE
Problem: Discharge Planning  Goal: Discharge to home or other facility with appropriate resources  Outcome: Progressing     Problem: Safety - Adult  Goal: Free from fall injury  Outcome: Progressing     Problem: ABCDS Injury Assessment  Goal: Absence of physical injury  Outcome: Progressing     Problem: Skin/Tissue Integrity  Goal: Absence of new skin breakdown  Description: 1.  Monitor for areas of redness and/or skin breakdown  2.  Assess vascular access sites hourly  3.  Every 4-6 hours minimum:  Change oxygen saturation probe site  4.  Every 4-6 hours:  If on nasal continuous positive airway pressure, respiratory therapy assess nares and determine need for appliance change or resting period.  7/3/2024 0000 by Cinthya Gresham, RN  Outcome: Progressing  7/2/2024 1701 by Karen Chan RN  Outcome: Progressing     Problem: Pain  Goal: Verbalizes/displays adequate comfort level or baseline comfort level  7/3/2024 0000 by Cinthya Gresham, RN  Outcome: Progressing  7/2/2024 1701 by Karen Chan RN  Outcome: Progressing     Problem: Chronic Conditions and Co-morbidities  Goal: Patient's chronic conditions and co-morbidity symptoms are monitored and maintained or improved  7/3/2024 0000 by Cinthya Gresham, RN  Outcome: Progressing  7/2/2024 1701 by Karen Chan RN  Outcome: Progressing

## 2024-07-03 NOTE — PROGRESS NOTES
CARDIOLOGY Progress Note         Patient Name: Agustina Fried  Date of admission: 7/1/2024  9:05 PM  Admission Dx: Shortness of breath [R06.02]  Hyperglycemia [R73.9]  Acute on chronic congestive heart failure, unspecified heart failure type (HCC) [I50.9]  Congestive heart failure, unspecified HF chronicity, unspecified heart failure type (HCC) [I50.9]  Acute on chronic heart failure with preserved ejection fraction (HFpEF) (HCC) [I50.33]  Stage 3 chronic kidney disease, unspecified whether stage 3a or 3b CKD (HCC) [N18.30]  Requesting Physician: No admitting provider for patient encounter.  Primary Care physician: Viridiana Blanco, SARTHAK - NP    Reason for Consultation/Chief Complaint: acute on chronic HFpEF    Subjective:  Patient seen and examined.  Patient laying in bed appears comfortable.  Reports still short of breath but improving reports she still has lower extremity edema.  She is up approximately 10 pounds since we saw her previously.  Her dry weight appears to be around 129 pounds.  She has been receiving IV Lasix 40 mg daily.  I's and O's not been accurate as her weight yesterday was 144 pounds.    Denies chest pain.  No other complaints.    History of Present Illness:     Agustina Fried is a 64 y.o. patient with past medical history of CAD, ischemic cardiomyopathy, HFrEF, stage III chronic kidney disease, dyslipidemia, history of CVA, history of renal cell carcinoma, history of breast cancer status post right mastectomy, history of colon cancer status post right hemicolectomy hypertension, diabetes, orthostatic hypotension who presented to the hospital with complaints of several days of weakness and shortness of breath.  Patient admitted for acute on chronic decompensated HFpEF.  Cardiology also consulted for elevated troponin.  Patient seen in consultation by interventional cardiology who did not recommend ischemic evaluation if echocardiogram revealed normal LV and RV function.  Echocardiogram  revealed preserved LV function with normal wall motion and normal RV size and function.  ED course/Vital signs on presentation:        Past Medical History:   has a past medical history of Abnormal brain MRI, Acute bilateral low back pain without sciatica, SHARRON (acute kidney injury) ([facility]), Arthritis, Bipolar disorder ([facility]), CAD (coronary artery disease), Cancer ([facility]), Carotid stenosis, bilateral:<50%:per US 7/2016, Carpal tunnel syndrome, Cervical cancer screening, Coronary artery disease of native artery of native heart with stable angina pectoris ([facility]), DDD (degenerative disc disease), lumbar, Depression, Depression/anxiety, Depression/anxiety, Diabetes mellitus ([facility]), Gout, History of mammogram, History of therapeutic radiation, Hyperlipidemia, Hypertension, 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    Surgical History:   has a past surgical history that includes Kidney removal; Hysterectomy; Breast lumpectomy (2015); Tubal ligation; other surgical history (Right); Cardiac catheterization (06/08/2020); Upper gastrointestinal endoscopy (N/A, 1/29/2021); Colonoscopy (N/A, 2/1/2021); CT BIOPSY BONE MARROW (2/3/2021); Temporal Artery Biopsy (Right, 8/9/2021); Upper gastrointestinal endoscopy (N/A, 12/15/2022); Colonoscopy (N/A, 2/27/2023); hemicolectomy (N/A, 3/7/2023); Upper gastrointestinal endoscopy (N/A, 4/25/2024); and Colonoscopy (N/A, 4/25/2024).     Social History:   reports that she quit smoking about 10 years ago. Her smoking use included cigarettes and cigars. She started smoking about 30 years ago. She has a 10.0 pack-year smoking history. She has never

## 2024-07-03 NOTE — PROGRESS NOTES
07/03/24 1040   Encounter Summary   Encounter Overview/Reason Attempted Encounter  (Patient was sleeping)   Last Encounter  07/03/24  ( left a card and said a silent prayer)   Assessment/Intervention/Outcome   Assessment Unable to assess   Intervention Prayer (assurance of)/Cavalier

## 2024-07-03 NOTE — PROGRESS NOTES
Patient c/o chronic back pain 10/10 on pain scale. PRN Percocet 7.5-325 mg given per MAR.  Will continue to monitor.

## 2024-07-03 NOTE — ACP (ADVANCE CARE PLANNING)
Advance Care Planning     Advance Care Planning Activator (Inpatient)  Conversation Note      Date of ACP Conversation: 7/3/2024     Conversation Conducted with: patient     ACP Activator: Trinity Triplett RN        Health Care Decision Maker:     Current Designated Health Care Decision Maker:     Primary Decision Maker: Maurice Fried - Child - [phone]    Secondary Decision Maker: Moi Fried - Brother/Sister - [phone]    Secondary Decision Maker: Marlyn Fried - Daughter-in-Law - [phone]    Supplemental (Other) Decision Maker: PEMA DIANE - Other - [phone]        Care Preferences    Ventilation:  \"If you were in your present state of health and suddenly became very ill and were unable to breathe on your own, what would your preference be about the use of a ventilator (breathing machine) if it were available to you?\"      Would the patient desire the use of ventilator (breathing machine)?: yes        Resuscitation  \"CPR works best to restart the heart when there is a sudden event, like a heart attack, in someone who is otherwise healthy. Unfortunately, CPR does not typically restart the heart for people who have serious health conditions or who are very sick.\"    \"In the event your heart stopped as a result of an underlying serious health condition, would you want attempts to be made to restart your heart (answer \"yes\" for attempt to resuscitate) or would you prefer a natural death (answer \"no\" for do not attempt to resuscitate)?\" yes       [x] Yes   [] No   Educated Patient / Decision Maker regarding differences between Advance Directives and portable DNR orders.    Length of ACP Conversation in minutes:  5    Conversation Outcomes:  ACP discussion completed    Follow-up plan:    [] Schedule follow-up conversation to continue planning  [x] Referred individual to Provider for additional questions/concerns   [] Advised patient/agent/surrogate to review completed ACP document and update if needed

## 2024-07-03 NOTE — PROGRESS NOTES
RN notified of patients low blood sugar of 55. Patient attempting to eat peanut butter and crackers. Rechecked sugar, 61. Patient has no IV access and requested glucagon injection in place of tablets. MD notified. Will recheck blood sugar is 15 min.    Patient next blood sugar @ 1650, 89

## 2024-07-03 NOTE — PROGRESS NOTES
Ph: [phone], Fax: [phone]           [facility]               Reason for admission:                 Hyperglycemia, fluid overload       Interval History and plan:      Edema still significantly present  Creatinine is 1.8 within her baseline range  Potassium 3.6  Lying flat  Potassium on the low side-left supplement  Got a dose of Lasix today   May need another dose tomorrow  Blood pressure is coming down significantly with Lasix   decrease the dose of amlodipine to 5 mg a day                Assessment :     CKD  1.9 at the time of consult  1.9 on 4/25/2024 at the time of last hospital discharge  Creatinine peaked to 3.1 last hospitalization    Hypertension   BP: (124-157)/(52-66)  Pulse:  [61-69]   BP goal inpatient 130-140 systolic inpatient        Hyponatremia  Sodium 129 on presentation 130  Going up appropriately  Has history of recurrent hyponatremia                  [facility] would like to thank Roger Lubin MD   for opportunity to serve this patient      Please call with questions at-   24 Hrs Answering service [phone] or  7 am- 5 pm via Perfect [employer] or cell phone  Dr.Sudhir Temo MD       HPI :     Agustina Fried is a 64 y.o. female presented to   the hospital on 7/1/2024 with severe hyperglycemia, also complaining of the legs.  She got a dose of Lasix at the time of hospitalization.  She was actually sent to the hospital because of very high glucose.  She is known to us for CKD .  Consulted for the same        PMH/PSH/SH/Family History:     Past Medical History:   Diagnosis Date    Abnormal brain MRI 7/20/2017    Partially empty sella and minimal chronic small vessel ischemic disease    Acute bilateral low back pain without sciatica 11/2/2016    SHARRON (acute kidney injury) (HCC) 7/5/2017    Arthritis     back    Bipolar disorder (HCC) 10/18/2008    CAD (coronary artery disease)     stent placed 6/8/20    Cancer ([facility]) 2015    bilateral breast:s/p

## 2024-07-03 NOTE — PROGRESS NOTES
HEART FAILURE CARE PLAN:    Comorbidities Reviewed: Yes   Patient has a past medical history of Abnormal brain MRI, Acute bilateral low back pain without sciatica, SHARRON (acute kidney injury) ([facility]), Arthritis, Bipolar disorder ([facility]), CAD (coronary artery disease), Cancer ([facility]), Carotid stenosis, bilateral:<50%:per US 7/2016, Carpal tunnel syndrome, Cervical cancer screening, Coronary artery disease of native artery of native heart with stable angina pectoris ([facility]), DDD (degenerative disc disease), lumbar, Depression, Depression/anxiety, Depression/anxiety, Diabetes mellitus ([facility]), Gout, History of mammogram, History of therapeutic radiation, Hyperlipidemia, Hypertension, 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     Weights Reviewed: Yes   Admission weight: 63.5 kg (140 lb)   Wt Readings from Last 3 Encounters:   07/02/24 65.5 kg (144 lb 6.4 oz)   04/25/24 62.7 kg (138 lb 4.8 oz)   02/28/24 57.6 kg (127 lb)     Intake & Output Reviewed: Yes     Intake/Output Summary (Last 24 hours) at 7/3/2024 0004  Last data filed at 7/2/2024 2032  Gross per 24 hour   Intake 930 ml   Output 1100 ml   Net -170 ml       ECHOCARDIOGRAM Reviewed: Yes   Patient's Ejection Fraction (EF) is greater than 40%     Medications Reviewed: Yes   SCHEDULED HOSPITAL MEDICATIONS:   insulin lispro  4 Units SubCUTAneous TID WC    insulin lispro  0-16 Units SubCUTAneous TID     lidocaine  1 patch TransDERmal Daily    gabapentin  300 mg Oral BID    paliperidone  9 mg Oral QAM    atorvastatin  40 mg Oral Nightly    traZODone  100 mg Oral Nightly    insulin glargine  8 Units SubCUTAneous Daily

## 2024-07-03 NOTE — CARE COORDINATION
Case Management Assessment  Initial Evaluation    Date/Time of Evaluation: 7/3/2024 8:29 AM  Assessment Completed by: Trinity Triplett RN    If patient is discharged prior to next notation, then this note serves as note for discharge by case management.    Patient Name: Agustina Fried                   YOB: 1960  Diagnosis: Shortness of breath [R06.02]  Hyperglycemia [R73.9]  Acute on chronic congestive heart failure, unspecified heart failure type (HCC) [I50.9]  Congestive heart failure, unspecified HF chronicity, unspecified heart failure type (HCC) [I50.9]  Acute on chronic heart failure with preserved ejection fraction (HFpEF) (HCC) [I50.33]  Stage 3 chronic kidney disease, unspecified whether stage 3a or 3b CKD (HCC) [N18.30]                   Date / Time: 7/1/2024  9:05 PM    Patient Admission Status: Inpatient   Readmission Risk (Low < 19, Mod (19-27), High > 27): Readmission Risk Score: 31.3    Current PCP: Viridiana Blanco APRN - NP  PCP verified by CM? Yes    Chart Reviewed: Yes      History Provided by: Patient  Patient Orientation: Alert and Oriented    Patient Cognition: Alert    Hospitalization in the last 30 days (Readmission):  No    If yes, Readmission Assessment in CM Navigator will be completed.    Advance Directives:      Code Status: Full Code   Patient's Primary Decision Maker is: Legal Next of Kin    Primary Decision Maker: CorkyMaurice - Child - [phone]    Secondary Decision Maker: Moi Fried - Brother/Sister - [phone]    Secondary Decision Maker: Marlyn Fried - Daughter-in-Law - [phone]    Supplemental (Other) Decision Maker: PEMA DIANE - Other - [phone]    Discharge Planning:    Patient lives with: Alone Type of Home: Apartment  Primary Care Giver: Self  Patient Support Systems include: Children   Current Financial resources: Medicaid  Current community resources: ECF/Home Care  Current services prior to admission: Durable Medical Equipment, Home Care  (active with [facility])            Current DME: Cane            Type of Home Care services:  Nursing Services    ADLS  Prior functional level: Independent in ADLs/IADLs  Current functional level: Independent in ADLs/IADLs    PT AM-PAC:   /24  OT AM-PAC:   /24    Family can provide assistance at DC: Yes  Would you like Case Management to discuss the discharge plan with any other family members/significant others, and if so, who? Yes (son)  Plans to Return to Present Housing: Yes  Potential Assistance needed at discharge: Other (Comment) (following for needs)            Potential DME:    Patient expects to discharge to: Apartment  Plan for transportation at discharge:      Financial    Payor: [employer] [place] MED[place] / Plan: [employer] [place] / Product Type: *No Product type* /     Does insurance require precert for SNF: Yes    Potential assistance Purchasing Medications: No  Meds-to-Beds request: Yes      [facility] [id number] - [place], [place] - [address] -  [phone] - F [phone]  [address]  [facility] [address]  Phone: [phone] Fax: [phone]    [id number] - [place], [place] - [address] - P [phone] - F [phone]  [address] [facility] [address]  Phone: [phone] Fax: [phone]      Notes:    Factors facilitating achievement of predicted outcomes: Family support, Motivated, Cooperative, and Pleasant    Additional Case Management Notes: Spoke with patient at bedside.   She lives at home alone but she states she has a good support system of children, friends and neighbors.  She is independent at home.  She is active with [facility] and will resume at discharge.        IF APPLICABLE: The Patient and/or patient representative Agustina and her family were provided with a choice of provider and agrees with the discharge plan. Freedom of choice list with basic dialogue that supports the patient's

## 2024-07-04 LAB
ALBUMIN SERPL-MCNC: 1.7 G/DL (ref 3.4–5)
ALBUMIN/GLOB SERPL: 0.4 {RATIO} (ref 1.1–2.2)
ALP SERPL-CCNC: 288 U/L (ref 40–129)
ALT SERPL-CCNC: 25 U/L (ref 10–40)
ANION GAP SERPL CALCULATED.3IONS-SCNC: 6 MMOL/L (ref 3–16)
AST SERPL-CCNC: 18 U/L (ref 15–37)
BACTERIA UR CULT: ABNORMAL
BACTERIA UR CULT: ABNORMAL
BASOPHILS # BLD: 0 K/UL (ref 0–0.2)
BASOPHILS NFR BLD: 0.5 %
BILIRUB SERPL-MCNC: 0.3 MG/DL (ref 0–1)
BUN SERPL-MCNC: 23 MG/DL (ref 7–20)
CALCIUM SERPL-MCNC: 7 MG/DL (ref 8.3–10.6)
CHLORIDE SERPL-SCNC: 106 MMOL/L (ref 99–110)
CO2 SERPL-SCNC: 25 MMOL/L (ref 21–32)
CREAT SERPL-MCNC: 2.2 MG/DL (ref 0.6–1.2)
DEPRECATED RDW RBC AUTO: 14.9 % (ref 12.4–15.4)
EOSINOPHIL # BLD: 0.1 K/UL (ref 0–0.6)
EOSINOPHIL NFR BLD: 0.9 %
GFR SERPLBLD CREATININE-BSD FMLA CKD-EPI: 24 ML/MIN/{1.73_M2}
GLUCOSE BLD-MCNC: 183 MG/DL (ref 70–99)
GLUCOSE BLD-MCNC: 187 MG/DL (ref 70–99)
GLUCOSE BLD-MCNC: 204 MG/DL (ref 70–99)
GLUCOSE BLD-MCNC: 241 MG/DL (ref 70–99)
GLUCOSE SERPL-MCNC: 194 MG/DL (ref 70–99)
HCT VFR BLD AUTO: 22.6 % (ref 36–48)
HGB BLD-MCNC: 7.1 G/DL (ref 12–16)
LYMPHOCYTES # BLD: 1.4 K/UL (ref 1–5.1)
LYMPHOCYTES NFR BLD: 23.1 %
MCH RBC QN AUTO: 27.8 PG (ref 26–34)
MCHC RBC AUTO-ENTMCNC: 31.5 G/DL (ref 31–36)
MCV RBC AUTO: 88.4 FL (ref 80–100)
MONOCYTES # BLD: 0.6 K/UL (ref 0–1.3)
MONOCYTES NFR BLD: 10.1 %
NEUTROPHILS # BLD: 4 K/UL (ref 1.7–7.7)
NEUTROPHILS NFR BLD: 65.4 %
NT-PROBNP SERPL-MCNC: 719 PG/ML (ref 0–124)
ORGANISM: ABNORMAL
ORGANISM: ABNORMAL
PERFORMED ON: ABNORMAL
PLATELET # BLD AUTO: 162 K/UL (ref 135–450)
PMV BLD AUTO: 9.2 FL (ref 5–10.5)
POTASSIUM SERPL-SCNC: 4.5 MMOL/L (ref 3.5–5.1)
PROT SERPL-MCNC: 5.9 G/DL (ref 6.4–8.2)
RBC # BLD AUTO: 2.56 M/UL (ref 4–5.2)
SODIUM SERPL-SCNC: 137 MMOL/L (ref 136–145)
WBC # BLD AUTO: 6.1 K/UL (ref 4–11)

## 2024-07-04 PROCEDURE — 85025 COMPLETE CBC W/AUTO DIFF WBC: CPT

## 2024-07-04 PROCEDURE — 6370000000 HC RX 637 (ALT 250 FOR IP): Performed by: NURSE PRACTITIONER

## 2024-07-04 PROCEDURE — 36415 COLL VENOUS BLD VENIPUNCTURE: CPT

## 2024-07-04 PROCEDURE — 80053 COMPREHEN METABOLIC PANEL: CPT

## 2024-07-04 PROCEDURE — 6370000000 HC RX 637 (ALT 250 FOR IP): Performed by: INTERNAL MEDICINE

## 2024-07-04 PROCEDURE — 99232 SBSQ HOSP IP/OBS MODERATE 35: CPT | Performed by: INTERNAL MEDICINE

## 2024-07-04 PROCEDURE — 6370000000 HC RX 637 (ALT 250 FOR IP): Performed by: STUDENT IN AN ORGANIZED HEALTH CARE EDUCATION/TRAINING PROGRAM

## 2024-07-04 PROCEDURE — 6360000002 HC RX W HCPCS: Performed by: INTERNAL MEDICINE

## 2024-07-04 PROCEDURE — 2060000000 HC ICU INTERMEDIATE R&B

## 2024-07-04 PROCEDURE — 83880 ASSAY OF NATRIURETIC PEPTIDE: CPT

## 2024-07-04 PROCEDURE — 2580000003 HC RX 258: Performed by: INTERNAL MEDICINE

## 2024-07-04 PROCEDURE — 2580000003 HC RX 258: Performed by: STUDENT IN AN ORGANIZED HEALTH CARE EDUCATION/TRAINING PROGRAM

## 2024-07-04 PROCEDURE — 6360000002 HC RX W HCPCS: Performed by: STUDENT IN AN ORGANIZED HEALTH CARE EDUCATION/TRAINING PROGRAM

## 2024-07-04 RX ADMIN — INSULIN LISPRO 4 UNITS: 100 INJECTION, SOLUTION INTRAVENOUS; SUBCUTANEOUS at 08:55

## 2024-07-04 RX ADMIN — PANTOPRAZOLE SODIUM 40 MG: 40 TABLET, DELAYED RELEASE ORAL at 13:37

## 2024-07-04 RX ADMIN — INSULIN GLARGINE 5 UNITS: 100 INJECTION, SOLUTION SUBCUTANEOUS at 08:53

## 2024-07-04 RX ADMIN — INSULIN LISPRO 4 UNITS: 100 INJECTION, SOLUTION INTRAVENOUS; SUBCUTANEOUS at 08:54

## 2024-07-04 RX ADMIN — ATORVASTATIN CALCIUM 40 MG: 40 TABLET, FILM COATED ORAL at 21:27

## 2024-07-04 RX ADMIN — SODIUM CHLORIDE, PRESERVATIVE FREE 10 ML: 5 INJECTION INTRAVENOUS at 08:45

## 2024-07-04 RX ADMIN — GABAPENTIN 300 MG: 300 CAPSULE ORAL at 21:27

## 2024-07-04 RX ADMIN — MEROPENEM 500 MG: 500 INJECTION, POWDER, FOR SOLUTION INTRAVENOUS at 08:44

## 2024-07-04 RX ADMIN — GABAPENTIN 300 MG: 300 CAPSULE ORAL at 08:53

## 2024-07-04 RX ADMIN — PALIPERIDONE 9 MG: 3 TABLET, EXTENDED RELEASE ORAL at 08:53

## 2024-07-04 RX ADMIN — TRAZODONE HYDROCHLORIDE 100 MG: 50 TABLET ORAL at 21:27

## 2024-07-04 RX ADMIN — MEROPENEM 1000 MG: 1 INJECTION INTRAVENOUS at 21:34

## 2024-07-04 RX ADMIN — SODIUM CHLORIDE, PRESERVATIVE FREE 10 ML: 5 INJECTION INTRAVENOUS at 21:28

## 2024-07-04 RX ADMIN — OXYCODONE AND ACETAMINOPHEN 1 TABLET: 7.5; 325 TABLET ORAL at 13:36

## 2024-07-04 RX ADMIN — ENOXAPARIN SODIUM 30 MG: 100 INJECTION SUBCUTANEOUS at 08:56

## 2024-07-04 RX ADMIN — AMLODIPINE BESYLATE 5 MG: 5 TABLET ORAL at 08:53

## 2024-07-04 RX ADMIN — OXYCODONE AND ACETAMINOPHEN 1 TABLET: 7.5; 325 TABLET ORAL at 08:53

## 2024-07-04 RX ADMIN — PANTOPRAZOLE SODIUM 40 MG: 40 TABLET, DELAYED RELEASE ORAL at 08:53

## 2024-07-04 ASSESSMENT — PAIN SCALES - WONG BAKER
WONGBAKER_NUMERICALRESPONSE: NO HURT

## 2024-07-04 ASSESSMENT — PAIN SCALES - GENERAL
PAINLEVEL_OUTOF10: 0
PAINLEVEL_OUTOF10: 5
PAINLEVEL_OUTOF10: 5
PAINLEVEL_OUTOF10: 4
PAINLEVEL_OUTOF10: 0
PAINLEVEL_OUTOF10: 5

## 2024-07-04 ASSESSMENT — PAIN DESCRIPTION - LOCATION: LOCATION: BACK

## 2024-07-04 NOTE — PROGRESS NOTES
CARDIOLOGY Progress Note         Patient Name: Agustina Fried  Date of admission: 7/1/2024  9:05 PM  Admission Dx: Shortness of breath [R06.02]  Hyperglycemia [R73.9]  Acute on chronic congestive heart failure, unspecified heart failure type (HCC) [I50.9]  Congestive heart failure, unspecified HF chronicity, unspecified heart failure type (HCC) [I50.9]  Acute on chronic heart failure with preserved ejection fraction (HFpEF) ([facility]) [I50.33]  Stage 3 chronic kidney disease, unspecified whether stage 3a or 3b CKD ([facility]) [N18.30]  Requesting Physician: No admitting provider for patient encounter.  Primary Care physician: Viridiana Blanco APRN - NP    Reason for Consultation/Chief Complaint: acute on chronic HFpEF    Subjective:  Patient seen and examined.  Patient laying in bed appears comfortable.  Complains of chest pain No other complaints.    History of Present Illness:     Agustina Fried is a 64 y.o. patient with past medical history of CAD, ischemic cardiomyopathy, HFrEF, stage III chronic kidney disease, dyslipidemia, history of CVA, history of renal cell carcinoma, history of breast cancer status post right mastectomy, history of colon cancer status post right hemicolectomy hypertension, diabetes, orthostatic hypotension who presented to the hospital with complaints of several days of weakness and shortness of breath.  Patient admitted for acute on chronic decompensated HFpEF.  Cardiology also consulted for elevated troponin.  Patient seen in consultation by interventional cardiology who did not recommend ischemic evaluation if echocardiogram revealed normal LV and RV function.  Echocardiogram revealed preserved LV function with normal wall motion and normal RV size and function.  ED course/Vital signs on presentation:        Past Medical History:   has a past medical history of Abnormal brain MRI, Acute bilateral low back pain without sciatica, SHARRON (acute kidney injury) ([facility]), Arthritis, Bipolar disorder

## 2024-07-04 NOTE — PROGRESS NOTES
V2.0    [facility] [place]      Name:  Agustina Fried /Age/Sex: 1960  (64 y.o. female)   MRN & CSN:  [id number] & [id number] Encounter Date/Time: 2024 12:50 PM EDT   Location:   PCP: Viridiana Blanco, APRN - NP     Attending:Roger Lubin MD       Hospital Day: 4    Assessment and Recommendations   Agustina Fried is a 64 y.o. female with a pmh of hypertension, diabetes mellitus, GERD, hyperlipidemia, who presents with Hyperglycemia due to diabetes mellitus (HCC)     Acute on chronic diastolic heart failure  ESBL Ecoli UTI  DM 2, uncontrolled with hyperglycemia  Hyperglycemia, resolved  Lactic acidosis  Hypertension  Hyperlipidemia  GERD  Chronic anemia  CKD stage IIIb    Plan:   Stat labs ordered, reviewed shows mild trend up of serum creatinine to 2.2 from 1.8 but NT proBNP continues to trend down  Continue IV Merrem day 2 of day 5  Appreciates Nephrology input  Will start patient on Invanz x 5 days  Daily weight is  Strict I's and O's  Will adjust insulin Lantus to 5 unit daily from 8 unit daily and mealtime bolus to 4 unit Tid meals instead of  5 unit 3 times daily along with high sliding scale  Continue other home medication for her comorbidities  Labs in a.m.              Comment: Please note this report has been produced using speech recognition software and may contain errors related to that system including errors in grammar, punctuation, and spelling, as well as words and phrases that may be inappropriate. If there are any questions or concerns please feel free to contact the dictating provider for clarification.   Diet ADULT DIET; Regular; 4 carb choices (60 gm/meal); No Added Salt (3-4 gm); 2000 ml   DVT Prophylaxis [x] Lovenox, []  Heparin, [x] SCDs, [] Ambulation,  [] Eliquis, [] Xarelto   Code Status Full Code   Disposition From: Home  Expected Disposition:  Home  Estimated Date of Discharge:      Surrogate Decision Maker/ POA       Personally reviewed Lab Studies and Imaging      Discussed management of the case with     Imaging that was interpreted personally     Drugs that require monitoring for toxicity include      Barrier for Discharge:      Heparin drip    Cardizem drip/Amiodarone drip    Pressors gtt    Protonix gtt   x IV Lasix/Bumex   x Pending nephro consult Eval    Pending Imaging/CT/MRI/Nuclear scan/Stress Test    IV Abx      Pending Cultures    Requiring BIPAP    Intubated/Mechanical Ventilation    Pending Placement    Pending Safe discharge plan   x Pending Ongoing evaluation and improvement   x Pending Clearance by Consult Service for discharge           Subjective:     CC: F/U for acute on chronic diastolic heart failure with hyperglycemia      Review of Systems:      Denies chest pain or shortness of breath orthopnea nausea or vomiting or diarrhea    Objective:     Intake/Output Summary (Last 24 hours) at 7/4/2024 1257  Last data filed at 7/3/2024 1922  Gross per 24 hour   Intake 680 ml   Output --   Net 680 ml        Vitals:   Vitals:    07/04/24 0555 07/04/24 0745 07/04/24 0923 07/04/24 1242   BP:  (!) 136/51  (!) 118/49   Pulse:  63  61   Resp:  16 16 14   Temp:  98.2 °F (36.8 °C)  98.6 °F (37 °C)   TempSrc:  Axillary  Oral   SpO2:  100%  100%   Weight: 63 kg (139 lb)      Height:             Physical Exam:      General: NAD  Eyes: EOMI  ENT: neck supple  Cardiovascular: Regular rate.  Respiratory: Clear to auscultation  Gastrointestinal: Soft, non tender  Genitourinary: no suprapubic tenderness  Musculoskeletal: Trace pedal edema  Skin: warm, dry  Neuro: Alert.  Psych: Mood appropriate.         Medications:   Medications:    meropenem  1,000 mg IntraVENous Q12H    amLODIPine  5 mg Oral Daily    insulin glargine  5 Units SubCUTAneous Daily    insulin lispro  4 Units SubCUTAneous TID WC    insulin lispro  0-16 Units SubCUTAneous TID WC    lidocaine  1 patch TransDERmal Daily    gabapentin  300 mg Oral BID    paliperidone  9 mg Oral QAM    atorvastatin  40 mg Oral

## 2024-07-04 NOTE — PLAN OF CARE
Problem: Chronic Conditions and Co-morbidities  Goal: Patient's chronic conditions and co-morbidity symptoms are monitored and maintained or improved  Outcome: Progressing     Problem: Discharge Planning  Goal: Discharge to home or other facility with appropriate resources  Outcome: Progressing     CHF Care Plan      Patient's EF (Ejection Fraction) is greater than 40%    Heart Failure Medications:  Diuretics:: Furosemide    (One of the following REQUIRED for EF </= 40%/SYSTOLIC FAILURE but MAY be used in EF% >40%/DIASTOLIC FAILURE)        ACE:: None        ARB:: None         ARNI:: None    (Beta Blockers)  NON- Evidenced Based Beta Blocker (for EF% >40%/DIASTOLIC FAILURE): None    Evidenced Based Beta Blocker::(REQUIRED for EF% <40%/SYSTOLIC FAILURE) None  ...................................................................................................................................................    Failed to redirect to the Timeline version of the [employer] SmartLink.      Patient's weights and intake/output reviewed    Daily Weight log at bedside, patient/family participation in use of log: \"yes    Patient's current weight today shows a difference of 5 lbs less than last documented weight.      Intake/Output Summary (Last 24 hours) at 7/4/2024 0526  Last data filed at 7/3/2024 1922  Gross per 24 hour   Intake 916 ml   Output --   Net 916 ml       Education Booklet Provided: yes    Comorbidities Reviewed Yes    Patient has a past medical history of Abnormal brain MRI, Acute bilateral low back pain without sciatica, SHARRON (acute kidney injury) (HCC), Arthritis, Bipolar disorder (HCC), CAD (coronary artery disease), Cancer (HCC), Carotid stenosis, bilateral:<50%:per US 7/2016, Carpal tunnel syndrome, Cervical cancer screening, Coronary artery disease of native artery of native heart with stable angina pectoris (HCC), DDD (degenerative disc disease), lumbar, Depression, Depression/anxiety, Depression/anxiety,

## 2024-07-04 NOTE — PROGRESS NOTES
Ph: [phone], Fax: [phone]           [facility]               Reason for admission:                 Hyperglycemia, fluid overload       Interval History and plan:      No lab work for this morning  The patient's serum creatinine was 1.8 yesterday  Sodium was 133  No leg edema  Most recent blood pressure 136/51 pulse of 100% on room air  Reviewed her medication list and I did not see any concerning medications  She is on amlodipine 5    She had normal EF between 55 to 60% and a normal right ventricle size.    As of now, there is no need for any further diuresis  The patient blood pressure control decent    The patient has proteinuria on UA, the protein was between 30 -300  Therefore, this patient would benefit from use of ACE inhibitor or ARB.  Which could also help patient's to reduce water retention in her legs from the use of 4 amlodipine    The patient was seen and examined in her room  She was resting calmly  She denies of any new discomfort  She still has some neck pain  Her most recent set of vital signs showed temperature 90.2 heart rate 63 and blood pressure 136/51                    Assessment :     CKD  1.9 at the time of consult  1.9 on 4/25/2024 at the time of last hospital discharge  Creatinine peaked to 3.1 last hospitalization    Hypertension   BP: (117-136)/(51-60)  Pulse:  [58-71]   BP goal inpatient 130-140 systolic inpatient        Hyponatremia  Sodium 129 on presentation 130  Going up appropriately  Has history of recurrent hyponatremia                  [facility] would like to thank Roger Lubin MD   for opportunity to serve this patient      Please call with questions at-   24 Hrs Answering service [phone] or  7 am- 5 pm via Perfect serve or cell phone  Dr.Shaoming Nghia MD       HPI :     Agustina Fried is a 64 y.o. female presented to   the hospital on 7/1/2024 with severe hyperglycemia, also complaining of the legs.  She got a dose of Lasix at  the time of hospitalization.  She was actually sent to the hospital because of very high glucose.  She is known to us for CKD .  Consulted for the same        PMH/PSH/SH/Family History:     Past Medical History:   Diagnosis Date    Abnormal brain MRI 7/20/2017    Partially empty sella and minimal chronic small vessel ischemic disease    Acute bilateral low back pain without sciatica 11/2/2016    SHARRON (acute kidney injury) ([facility]) 7/5/2017    Arthritis     back    Bipolar disorder ([facility]) 10/18/2008    CAD (coronary artery disease)     stent placed 6/8/20    Cancer ([facility]) 2015    bilateral breast:s/p lumpectomy/radiation:under care care of breast specialist:Dr. Boone     Carotid stenosis, bilateral:<50%:per US 7/2016 7/15/2016    Carpal tunnel syndrome 10/18/2008    Cervical cancer screening 2014    Nml per pt'.    Coronary artery disease of native artery of native heart with stable angina pectoris ([facility]) 6/9/2020    DDD (degenerative disc disease), lumbar 7/18/2018    Depression     under care of pschiatrist:Dr. 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 myelopathy or radiculopathy 3/10/2017    Transient cerebral ischemia 07/15/2016    TIA:7/10/16    Unspecified cerebral artery occlusion with cerebral infarction     TIA          Medication:     Current Facility-Administered Medications:

## 2024-07-04 NOTE — PROGRESS NOTES
07/04/24 1254   Encounter Summary   Encounter Overview/Reason Follow-up   Service Provided For Patient   Referral/Consult From Nurse   Support System Children   Last Encounter  07/04/24  ( visited with patient - confirmed status of ACP in system.  Provided her a copy.)

## 2024-07-05 LAB
ABO + RH BLD: NORMAL
ABO + RH BLD: NORMAL
ANION GAP SERPL CALCULATED.3IONS-SCNC: 4 MMOL/L (ref 3–16)
BASOPHILS # BLD: 0 K/UL (ref 0–0.2)
BASOPHILS NFR BLD: 0.5 %
BLD GP AB SCN SERPL QL: NORMAL
BLOOD BANK DISPENSE STATUS: NORMAL
BLOOD BANK PRODUCT CODE: NORMAL
BPU ID: NORMAL
BUN SERPL-MCNC: 23 MG/DL (ref 7–20)
CALCIUM SERPL-MCNC: 7.5 MG/DL (ref 8.3–10.6)
CHLORIDE SERPL-SCNC: 104 MMOL/L (ref 99–110)
CO2 SERPL-SCNC: 26 MMOL/L (ref 21–32)
CREAT SERPL-MCNC: 2.2 MG/DL (ref 0.6–1.2)
DEPRECATED RDW RBC AUTO: 14.9 % (ref 12.4–15.4)
DESCRIPTION BLOOD BANK: NORMAL
EOSINOPHIL # BLD: 0 K/UL (ref 0–0.6)
EOSINOPHIL NFR BLD: 0.8 %
FOLATE SERPL-MCNC: 13.45 NG/ML (ref 4.78–24.2)
GFR SERPLBLD CREATININE-BSD FMLA CKD-EPI: 24 ML/MIN/{1.73_M2}
GLUCOSE BLD-MCNC: 150 MG/DL (ref 70–99)
GLUCOSE BLD-MCNC: 238 MG/DL (ref 70–99)
GLUCOSE BLD-MCNC: 265 MG/DL (ref 70–99)
GLUCOSE BLD-MCNC: 83 MG/DL (ref 70–99)
GLUCOSE SERPL-MCNC: 270 MG/DL (ref 70–99)
HCT VFR BLD AUTO: 21.5 % (ref 36–48)
HCT VFR BLD AUTO: 23.9 % (ref 36–48)
HCT VFR BLD AUTO: 28.8 % (ref 36–48)
HGB BLD-MCNC: 6.9 G/DL (ref 12–16)
HGB BLD-MCNC: 9.3 G/DL (ref 12–16)
IMMATURE RETIC FRACT: 0.47 (ref 0.21–0.37)
LYMPHOCYTES # BLD: 1.5 K/UL (ref 1–5.1)
LYMPHOCYTES NFR BLD: 25.7 %
MAGNESIUM SERPL-MCNC: 1.4 MG/DL (ref 1.8–2.4)
MAGNESIUM SERPL-MCNC: 2.2 MG/DL (ref 1.8–2.4)
MCH RBC QN AUTO: 28.9 PG (ref 26–34)
MCHC RBC AUTO-ENTMCNC: 32.3 G/DL (ref 31–36)
MCV RBC AUTO: 89.4 FL (ref 80–100)
MONOCYTES # BLD: 0.5 K/UL (ref 0–1.3)
MONOCYTES NFR BLD: 9.3 %
NEUTROPHILS # BLD: 3.6 K/UL (ref 1.7–7.7)
NEUTROPHILS NFR BLD: 63.7 %
PERFORMED ON: ABNORMAL
PERFORMED ON: NORMAL
PLATELET # BLD AUTO: 163 K/UL (ref 135–450)
PMV BLD AUTO: 9.7 FL (ref 5–10.5)
POTASSIUM SERPL-SCNC: 5 MMOL/L (ref 3.5–5.1)
RBC # BLD AUTO: 2.4 M/UL (ref 4–5.2)
RETICS # AUTO: 0.06 M/UL (ref 0.02–0.1)
RETICS/RBC NFR AUTO: 2.24 % (ref 0.5–2.18)
SODIUM SERPL-SCNC: 134 MMOL/L (ref 136–145)
VIT B12 SERPL-MCNC: 1182 PG/ML (ref 211–911)
WBC # BLD AUTO: 5.7 K/UL (ref 4–11)

## 2024-07-05 PROCEDURE — 2580000003 HC RX 258: Performed by: STUDENT IN AN ORGANIZED HEALTH CARE EDUCATION/TRAINING PROGRAM

## 2024-07-05 PROCEDURE — 85025 COMPLETE CBC W/AUTO DIFF WBC: CPT

## 2024-07-05 PROCEDURE — 6370000000 HC RX 637 (ALT 250 FOR IP): Performed by: INTERNAL MEDICINE

## 2024-07-05 PROCEDURE — 86901 BLOOD TYPING SEROLOGIC RH(D): CPT

## 2024-07-05 PROCEDURE — 85018 HEMOGLOBIN: CPT

## 2024-07-05 PROCEDURE — P9016 RBC LEUKOCYTES REDUCED: HCPCS

## 2024-07-05 PROCEDURE — 85014 HEMATOCRIT: CPT

## 2024-07-05 PROCEDURE — 86923 COMPATIBILITY TEST ELECTRIC: CPT

## 2024-07-05 PROCEDURE — 6370000000 HC RX 637 (ALT 250 FOR IP): Performed by: NURSE PRACTITIONER

## 2024-07-05 PROCEDURE — 83735 ASSAY OF MAGNESIUM: CPT

## 2024-07-05 PROCEDURE — 6360000002 HC RX W HCPCS: Performed by: INTERNAL MEDICINE

## 2024-07-05 PROCEDURE — 36415 COLL VENOUS BLD VENIPUNCTURE: CPT

## 2024-07-05 PROCEDURE — 36430 TRANSFUSION BLD/BLD COMPNT: CPT

## 2024-07-05 PROCEDURE — 85045 AUTOMATED RETICULOCYTE COUNT: CPT

## 2024-07-05 PROCEDURE — 6370000000 HC RX 637 (ALT 250 FOR IP): Performed by: STUDENT IN AN ORGANIZED HEALTH CARE EDUCATION/TRAINING PROGRAM

## 2024-07-05 PROCEDURE — 82746 ASSAY OF FOLIC ACID SERUM: CPT

## 2024-07-05 PROCEDURE — 6360000002 HC RX W HCPCS: Performed by: STUDENT IN AN ORGANIZED HEALTH CARE EDUCATION/TRAINING PROGRAM

## 2024-07-05 PROCEDURE — 82607 VITAMIN B-12: CPT

## 2024-07-05 PROCEDURE — 80048 BASIC METABOLIC PNL TOTAL CA: CPT

## 2024-07-05 PROCEDURE — 2060000000 HC ICU INTERMEDIATE R&B

## 2024-07-05 PROCEDURE — 86900 BLOOD TYPING SEROLOGIC ABO: CPT

## 2024-07-05 PROCEDURE — 2580000003 HC RX 258: Performed by: INTERNAL MEDICINE

## 2024-07-05 PROCEDURE — 30233N1 TRANSFUSION OF NONAUTOLOGOUS RED BLOOD CELLS INTO PERIPHERAL VEIN, PERCUTANEOUS APPROACH: ICD-10-PCS | Performed by: STUDENT IN AN ORGANIZED HEALTH CARE EDUCATION/TRAINING PROGRAM

## 2024-07-05 PROCEDURE — 86850 RBC ANTIBODY SCREEN: CPT

## 2024-07-05 RX ORDER — SODIUM CHLORIDE 9 MG/ML
INJECTION, SOLUTION INTRAVENOUS PRN
Status: DISCONTINUED | OUTPATIENT
Start: 2024-07-05 | End: 2024-07-12 | Stop reason: HOSPADM

## 2024-07-05 RX ORDER — TORSEMIDE 20 MG/1
20 TABLET ORAL DAILY
Status: DISCONTINUED | OUTPATIENT
Start: 2024-07-05 | End: 2024-07-06

## 2024-07-05 RX ORDER — INSULIN GLARGINE 100 [IU]/ML
7 INJECTION, SOLUTION SUBCUTANEOUS DAILY
Status: DISCONTINUED | OUTPATIENT
Start: 2024-07-06 | End: 2024-07-08

## 2024-07-05 RX ORDER — MAGNESIUM SULFATE IN WATER 40 MG/ML
4000 INJECTION, SOLUTION INTRAVENOUS ONCE
Status: COMPLETED | OUTPATIENT
Start: 2024-07-05 | End: 2024-07-05

## 2024-07-05 RX ADMIN — MEROPENEM 1000 MG: 1 INJECTION INTRAVENOUS at 21:53

## 2024-07-05 RX ADMIN — TRAZODONE HYDROCHLORIDE 100 MG: 50 TABLET ORAL at 21:51

## 2024-07-05 RX ADMIN — PANTOPRAZOLE SODIUM 40 MG: 40 TABLET, DELAYED RELEASE ORAL at 12:40

## 2024-07-05 RX ADMIN — ATORVASTATIN CALCIUM 40 MG: 40 TABLET, FILM COATED ORAL at 21:51

## 2024-07-05 RX ADMIN — INSULIN GLARGINE 5 UNITS: 100 INJECTION, SOLUTION SUBCUTANEOUS at 08:16

## 2024-07-05 RX ADMIN — INSULIN LISPRO 4 UNITS: 100 INJECTION, SOLUTION INTRAVENOUS; SUBCUTANEOUS at 08:25

## 2024-07-05 RX ADMIN — OXYCODONE AND ACETAMINOPHEN 1 TABLET: 7.5; 325 TABLET ORAL at 21:55

## 2024-07-05 RX ADMIN — PALIPERIDONE 9 MG: 3 TABLET, EXTENDED RELEASE ORAL at 08:25

## 2024-07-05 RX ADMIN — GABAPENTIN 300 MG: 300 CAPSULE ORAL at 08:15

## 2024-07-05 RX ADMIN — TORSEMIDE 20 MG: 20 TABLET ORAL at 14:46

## 2024-07-05 RX ADMIN — INSULIN LISPRO 4 UNITS: 100 INJECTION, SOLUTION INTRAVENOUS; SUBCUTANEOUS at 12:39

## 2024-07-05 RX ADMIN — ENOXAPARIN SODIUM 30 MG: 100 INJECTION SUBCUTANEOUS at 08:16

## 2024-07-05 RX ADMIN — MAGNESIUM SULFATE IN WATER FOR 4000 MG: 40 INJECTION INTRAVENOUS at 11:09

## 2024-07-05 RX ADMIN — MEROPENEM 1000 MG: 1 INJECTION INTRAVENOUS at 08:17

## 2024-07-05 RX ADMIN — SODIUM CHLORIDE, PRESERVATIVE FREE 10 ML: 5 INJECTION INTRAVENOUS at 08:16

## 2024-07-05 RX ADMIN — ONDANSETRON 4 MG: 4 TABLET, ORALLY DISINTEGRATING ORAL at 14:45

## 2024-07-05 RX ADMIN — AMLODIPINE BESYLATE 5 MG: 5 TABLET ORAL at 08:16

## 2024-07-05 RX ADMIN — GABAPENTIN 300 MG: 300 CAPSULE ORAL at 21:51

## 2024-07-05 RX ADMIN — INSULIN LISPRO 8 UNITS: 100 INJECTION, SOLUTION INTRAVENOUS; SUBCUTANEOUS at 08:25

## 2024-07-05 RX ADMIN — OXYCODONE AND ACETAMINOPHEN 1 TABLET: 7.5; 325 TABLET ORAL at 14:46

## 2024-07-05 RX ADMIN — PANTOPRAZOLE SODIUM 40 MG: 40 TABLET, DELAYED RELEASE ORAL at 08:15

## 2024-07-05 RX ADMIN — OXYCODONE AND ACETAMINOPHEN 1 TABLET: 7.5; 325 TABLET ORAL at 00:51

## 2024-07-05 ASSESSMENT — PAIN SCALES - GENERAL
PAINLEVEL_OUTOF10: 6
PAINLEVEL_OUTOF10: 0
PAINLEVEL_OUTOF10: 6
PAINLEVEL_OUTOF10: 0

## 2024-07-05 ASSESSMENT — PAIN DESCRIPTION - FREQUENCY
FREQUENCY: CONTINUOUS
FREQUENCY: CONTINUOUS

## 2024-07-05 ASSESSMENT — PAIN DESCRIPTION - LOCATION
LOCATION: GENERALIZED
LOCATION: GENERALIZED

## 2024-07-05 ASSESSMENT — PAIN DESCRIPTION - PAIN TYPE
TYPE: CHRONIC PAIN
TYPE: CHRONIC PAIN

## 2024-07-05 ASSESSMENT — PAIN DESCRIPTION - ONSET
ONSET: ON-GOING
ONSET: ON-GOING

## 2024-07-05 ASSESSMENT — PAIN DESCRIPTION - DESCRIPTORS
DESCRIPTORS: ACHING
DESCRIPTORS: ACHING

## 2024-07-05 NOTE — CARE COORDINATION
Spoke with RN who states patient's hemoglobin low today and patient is getting blood at this time.  Patient is from home alone but has a good support system.  Patient is active with [facility] and will resume at discharge.

## 2024-07-05 NOTE — PROGRESS NOTES
CHF Care Plan      Patient's EF (Ejection Fraction) is greater than 40%    Heart Failure Medications:  Diuretics:: None    (One of the following REQUIRED for EF </= 40%/SYSTOLIC FAILURE but MAY be used in EF% >40%/DIASTOLIC FAILURE)        ACE:: None        ARB:: None         ARNI:: None    (Beta Blockers)  NON- Evidenced Based Beta Blocker (for EF% >40%/DIASTOLIC FAILURE): None    Evidenced Based Beta Blocker::(REQUIRED for EF% <40%/SYSTOLIC FAILURE) None  ...................................................................................................................................................    Failed to redirect to the Timeline version of the [employer] SmartLink.      Patient's weights and intake/output reviewed    Daily Weight log at bedside, patient/family participation in use of log: \"yes    Patient's current weight today shows a difference of 2 lbs more than last documented weight.      Intake/Output Summary (Last 24 hours) at 7/5/2024 0603  Last data filed at 7/5/2024 0544  Gross per 24 hour   Intake 240 ml   Output --   Net 240 ml       Education Booklet Provided: yes    Comorbidities Reviewed Yes    Patient has a past medical history of Abnormal brain MRI, Acute bilateral low back pain without sciatica, SHARRON (acute kidney injury) ([facility]), Arthritis, Bipolar disorder ([facility]), CAD (coronary artery disease), Cancer (HCC), Carotid stenosis, bilateral:<50%:per US 7/2016, Carpal tunnel syndrome, Cervical cancer screening, Coronary artery disease of native artery of native heart with stable angina pectoris (HCC), DDD (degenerative disc disease), lumbar, Depression, Depression/anxiety, Depression/anxiety, Diabetes mellitus ([facility]), Gout, History of mammogram, History of therapeutic radiation, Hyperlipidemia, Hypertension, 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     >>For CHF and Comorbidity documentation on Education Time and Topics, please see Education Tab      Pt resting in bed at this time on room air. Pt denies shortness of breath. Pt with pitting lower extremity edema.     Patient and/or Family's stated Goal of Care this Admission: reduce shortness of breath, increase activity tolerance, better understand heart failure and disease management, be more comfortable, and reduce lower extremity edema prior to discharge        :

## 2024-07-05 NOTE — PROGRESS NOTES
Shift Summary    Admitting Dx:  SOB, Hyperglycemia - CHF    Shift Events:  Patient rested well overnight   PRN Percocet given for generalized pain.    Hgb 6.9 this morning - no new orders at this time     Vitals:  Vitals:    07/04/24 1940 07/05/24 0050 07/05/24 0051 07/05/24 0544   BP: 130/61 (!) 130/57  (!) 118/58   Pulse: 62 64  63   Resp: 16 16 16 16   Temp: 97.9 °F (36.6 °C) 97.8 °F (36.6 °C)  98.1 °F (36.7 °C)   TempSrc: Oral Oral  Oral   SpO2: 100% 97%  100%   Weight:    64 kg (141 lb 1.6 oz)   Height:            Wt Readings from Last 3 Encounters:   07/05/24 64 kg (141 lb 1.6 oz)   04/25/24 62.7 kg (138 lb 4.8 oz)   02/28/24 57.6 kg (127 lb)        Tele:  SR    IV/Line:  R AC PIV     Drains/Dooley:  N/A     Neuro:   A&Ox4     I/O:   I/O last 3 completed shifts:  In: 916 [P.O.:916]  Out: -     I/O this shift:  In: 240 [P.O.:240]  Out: -

## 2024-07-05 NOTE — TELEPHONE ENCOUNTER
Pharmacy called about this prescription. Explained that we cannot reach patient to make an appt, which is needed in order to process refill. They will attempt to contact patient.

## 2024-07-05 NOTE — PROGRESS NOTES
Ph: [phone], Fax: [phone]           [facility]               Reason for admission:                 Hyperglycemia, fluid overload       Interval History and plan:     She is off of diuretics  Currently creatinine 2.2 which is about her baseline  Potassium on high side at 5  Very edematous again  Off of IV Lasix  Will start p.o. torsemide since potassium is 5 no indication for potassium supplement  However will supplement magnesium since it is only 1.4                  Assessment :     CKD  1.9 at the time of consult  1.9 on 4/25/2024 at the time of last hospital discharge  Creatinine peaked to 3.1 last hospitalization    Hypertension   BP: (118-123)/(58-67)  Pulse:  [63-70]   BP goal inpatient 130-140 systolic inpatient        Hyponatremia  Sodium 129 on presentation 130  Going up appropriately  Has history of recurrent hyponatremia                  [facility] would like to thank Herbie Randolph DO   for opportunity to serve this patient      Please call with questions at-   24 Hrs Answering service [phone] or  7 am- 5 pm via Perfect serve or cell phone  Dr.Sudhir Temo MD       HPI :     Agustina Fried is a 64 y.o. female presented to   the hospital on 7/1/2024 with severe hyperglycemia, also complaining of the legs.  She got a dose of Lasix at the time of hospitalization.  She was actually sent to the hospital because of very high glucose.  She is known to us for CKD .  Consulted for the same        PMH/PSH/SH/Family History:     Past Medical History:   Diagnosis Date    Abnormal brain MRI 7/20/2017    Partially empty sella and minimal chronic small vessel ischemic disease    Acute bilateral low back pain without sciatica 11/2/2016    SHARR[facility] (acute kidney injury) ([facility]) 7/5/2017    Arthritis     back    Bipolar disorder ([facility]) 10/18/2008    CAD (coronary artery disease)     stent placed 6/8/20    Cancer ([facility]) 2015    bilateral breast:s/p lumpectomy/radiation:under care    HCT 22.6* 21.5* 23.9*    163  --        BMP:    Recent Labs     07/03/24  0853 07/04/24  1022 07/05/24  0453   * 137 134*   K 3.6 4.5 5.0    106 104   CO2 21 25 26   BUN 20 23* 23*   CREATININE 1.8* 2.2* 2.2*   GLUCOSE 104* 194* 270*   MG  --   --  1.40*       Lab Results   Component Value Date/Time    COLORU Straw 07/01/2024 11:04 PM    NITRU Negative 07/01/2024 11:04 PM    GLUCOSEU >=1000 07/01/2024 11:04 PM    GLUCOSEU >=1000 mg/dL 06/07/2010 03:38 PM    KETUA Negative 07/01/2024 11:04 PM    UROBILINOGEN 0.2 07/01/2024 11:04 PM    BILIRUBINUR Negative 07/01/2024 11:04 PM        ----------------------------------------------------------  Please call with questions at      24 Hrs Answering service [phone]  Perfect serve, or cell phone 7 am - 5pm  Glen Plascencia MD   [facility]

## 2024-07-05 NOTE — PROGRESS NOTES
V2.0    [facility] Progress Note      Name:  Agustina Fried /Age/Sex: 1960  (64 y.o. female)   MRN & CSN:  [id number] & [id number] Encounter Date/Time: 2024 12:50 PM EDT   Location:   PCP: Viridiana Blanco APRN - NP     Attending:Herbie Randolph DO       Hospital Day: 5    Assessment and Recommendations   Agustina Fried is a 64 y.o. female with a pmh of hypertension, diabetes mellitus, GERD, hyperlipidemia, who presents with Hyperglycemia due to diabetes mellitus (HCC)     Acute on chronic diastolic heart failure  ESBL Ecoli UTI  Acute on chronic normocytic anemia  DM 2, uncontrolled with hyperglycemia  Hyperglycemia, resolved  Lactic acidosis  Hypertension  Hyperlipidemia  GERD  Chronic anemia  CKD stage IIIb    Plan:     Appreciates Nephrology input  Patient received 2-day course of meropenem however given lack of systemic signs of infection or evidence of pyelonephritis despite having ESBL I will de-escalate patient to Bactrim which is a reasonable choice at reduced dose given her creatinine clearance.  Daily weight is  Strict I's and O's  Increase Lantus to 7 units  Patient required 1 unit of blood transfusion.  No evidence of blood loss.  I believe this is most likely due to CKD 3B.  She shows evidence of hypoproliferation based on reticulocyte count.  Will measure EPO level tomorrow iron studies was done recently and showed ferritin of about 300 I am also checking B12 and folate level.  Continue other home medication for her comorbidities  Labs in a.m.              Comment: Please note this report has been produced using speech recognition software and may contain errors related to that system including errors in grammar, punctuation, and spelling, as well as words and phrases that may be inappropriate. If there are any questions or concerns please feel free to contact the dictating provider for clarification.   Diet ADULT DIET; Regular; 4 carb choices (60 gm/meal); No Added Salt (3-4  7/6/2024] insulin glargine  7 Units SubCUTAneous Daily    torsemide  20 mg Oral Daily    meropenem  1,000 mg IntraVENous Q12H    amLODIPine  5 mg Oral Daily    insulin lispro  4 Units SubCUTAneous TID WC    insulin lispro  0-16 Units SubCUTAneous TID WC    lidocaine  1 patch TransDERmal Daily    gabapentin  300 mg Oral BID    paliperidone  9 mg Oral QAM    atorvastatin  40 mg Oral Nightly    traZODone  100 mg Oral Nightly    pantoprazole  40 mg Oral BID    sodium chloride flush  5-40 mL IntraVENous 2 times per day    enoxaparin  30 mg SubCUTAneous Daily      Infusions:    sodium chloride      dextrose      sodium chloride       PRN Meds: sodium chloride, , PRN  glucose, 4 tablet, PRN  dextrose bolus, 125 mL, PRN   Or  dextrose bolus, 250 mL, PRN  glucagon (rDNA), 1 mg, PRN  dextrose, , Continuous PRN  oxyCODONE-acetaminophen, 1 tablet, Q4H PRN  sodium chloride flush, 5-40 mL, PRN  sodium chloride, , PRN  ondansetron, 4 mg, Q8H PRN   Or  ondansetron, 4 mg, Q6H PRN  polyethylene glycol, 17 g, Daily PRN  acetaminophen, 650 mg, Q6H PRN   Or  acetaminophen, 650 mg, Q6H PRN  perflutren lipid microspheres, 1.5 mL, ONCE PRN        Labs and Imaging   XR CHEST PORTABLE    Result Date: 7/1/2024  EXAMINATION: ONE XRAY VIEW OF THE CHEST 7/1/2024 9:25 pm COMPARISON: 04/17/2024 HISTORY: ORDERING SYSTEM PROVIDED HISTORY: shortness of breath TECHNOLOGIST PROVIDED HISTORY: Reason for exam:->shortness of breath Reason for Exam: sob FINDINGS: Increasing small right pleural effusion with right basilar atelectasis.  No pneumothorax.  Left lung clear. Heart size is normal.     Increasing small right pleural effusion with mild right basilar atelectasis.       CBC:   Recent Labs     07/04/24  1022 07/05/24  0452   WBC 6.1 5.7   HGB 7.1* 6.9*    163       BMP:    Recent Labs     07/03/24  0853 07/04/24  1022 07/05/24  0453   * 137 134*   K 3.6 4.5 5.0    106 104   CO2 21 25 26   BUN 20 23* 23*   CREATININE 1.8* 2.2* 2.2*

## 2024-07-05 NOTE — CONSENT
Informed Consent for Blood Component Transfusion Note    I have discussed with the patient the rationale for blood component transfusion; its benefits in treating or preventing fatigue, organ damage, or death; and its risk which includes mild transfusion reactions, rare risk of blood borne infection, or more serious but rare reactions. I have discussed the alternatives to transfusion, including the risk and consequences of not receiving transfusion. The patient had an opportunity to ask questions and had agreed to proceed with transfusion of blood components.    Electronically signed by Herbie Randolph DO on 7/5/24 at 10:59 AM EDT

## 2024-07-06 LAB
ANION GAP SERPL CALCULATED.3IONS-SCNC: 5 MMOL/L (ref 3–16)
BASOPHILS # BLD: 0 K/UL (ref 0–0.2)
BASOPHILS NFR BLD: 0.5 %
BUN SERPL-MCNC: 23 MG/DL (ref 7–20)
CALCIUM SERPL-MCNC: 7.5 MG/DL (ref 8.3–10.6)
CHLORIDE SERPL-SCNC: 102 MMOL/L (ref 99–110)
CO2 SERPL-SCNC: 26 MMOL/L (ref 21–32)
CREAT SERPL-MCNC: 2.2 MG/DL (ref 0.6–1.2)
DEPRECATED RDW RBC AUTO: 15.2 % (ref 12.4–15.4)
EKG ATRIAL RATE: 76 BPM
EKG DIAGNOSIS: NORMAL
EKG P AXIS: 38 DEGREES
EKG P-R INTERVAL: 142 MS
EKG Q-T INTERVAL: 374 MS
EKG QRS DURATION: 76 MS
EKG QTC CALCULATION (BAZETT): 420 MS
EKG R AXIS: 0 DEGREES
EKG T AXIS: 40 DEGREES
EKG VENTRICULAR RATE: 76 BPM
EOSINOPHIL # BLD: 0.1 K/UL (ref 0–0.6)
EOSINOPHIL NFR BLD: 0.8 %
GFR SERPLBLD CREATININE-BSD FMLA CKD-EPI: 24 ML/MIN/{1.73_M2}
GLUCOSE BLD-MCNC: 102 MG/DL (ref 70–99)
GLUCOSE BLD-MCNC: 149 MG/DL (ref 70–99)
GLUCOSE BLD-MCNC: 169 MG/DL (ref 70–99)
GLUCOSE BLD-MCNC: 183 MG/DL (ref 70–99)
GLUCOSE BLD-MCNC: 258 MG/DL (ref 70–99)
GLUCOSE SERPL-MCNC: 148 MG/DL (ref 70–99)
HCT VFR BLD AUTO: 25.5 % (ref 36–48)
HCT VFR BLD AUTO: 26.5 % (ref 36–48)
HGB BLD-MCNC: 8.4 G/DL (ref 12–16)
HGB BLD-MCNC: 8.7 G/DL (ref 12–16)
LYMPHOCYTES # BLD: 1.9 K/UL (ref 1–5.1)
LYMPHOCYTES NFR BLD: 24.2 %
MAGNESIUM SERPL-MCNC: 2.2 MG/DL (ref 1.8–2.4)
MCH RBC QN AUTO: 29.9 PG (ref 26–34)
MCHC RBC AUTO-ENTMCNC: 32.9 G/DL (ref 31–36)
MCV RBC AUTO: 90.8 FL (ref 80–100)
MONOCYTES # BLD: 0.8 K/UL (ref 0–1.3)
MONOCYTES NFR BLD: 10.3 %
NEUTROPHILS # BLD: 5 K/UL (ref 1.7–7.7)
NEUTROPHILS NFR BLD: 64.2 %
PERFORMED ON: ABNORMAL
PLATELET # BLD AUTO: 188 K/UL (ref 135–450)
PMV BLD AUTO: 9.9 FL (ref 5–10.5)
POTASSIUM SERPL-SCNC: 4.9 MMOL/L (ref 3.5–5.1)
POTASSIUM SERPL-SCNC: 5.4 MMOL/L (ref 3.5–5.1)
RBC # BLD AUTO: 2.92 M/UL (ref 4–5.2)
SODIUM SERPL-SCNC: 133 MMOL/L (ref 136–145)
WBC # BLD AUTO: 7.8 K/UL (ref 4–11)

## 2024-07-06 PROCEDURE — 97166 OT EVAL MOD COMPLEX 45 MIN: CPT

## 2024-07-06 PROCEDURE — 97110 THERAPEUTIC EXERCISES: CPT

## 2024-07-06 PROCEDURE — 6360000002 HC RX W HCPCS: Performed by: STUDENT IN AN ORGANIZED HEALTH CARE EDUCATION/TRAINING PROGRAM

## 2024-07-06 PROCEDURE — 97162 PT EVAL MOD COMPLEX 30 MIN: CPT

## 2024-07-06 PROCEDURE — 97530 THERAPEUTIC ACTIVITIES: CPT

## 2024-07-06 PROCEDURE — 84132 ASSAY OF SERUM POTASSIUM: CPT

## 2024-07-06 PROCEDURE — 82668 ASSAY OF ERYTHROPOIETIN: CPT

## 2024-07-06 PROCEDURE — 85018 HEMOGLOBIN: CPT

## 2024-07-06 PROCEDURE — 36415 COLL VENOUS BLD VENIPUNCTURE: CPT

## 2024-07-06 PROCEDURE — 2580000003 HC RX 258: Performed by: STUDENT IN AN ORGANIZED HEALTH CARE EDUCATION/TRAINING PROGRAM

## 2024-07-06 PROCEDURE — 83735 ASSAY OF MAGNESIUM: CPT

## 2024-07-06 PROCEDURE — 6370000000 HC RX 637 (ALT 250 FOR IP): Performed by: INTERNAL MEDICINE

## 2024-07-06 PROCEDURE — 6370000000 HC RX 637 (ALT 250 FOR IP): Performed by: NURSE PRACTITIONER

## 2024-07-06 PROCEDURE — 80048 BASIC METABOLIC PNL TOTAL CA: CPT

## 2024-07-06 PROCEDURE — 93005 ELECTROCARDIOGRAM TRACING: CPT | Performed by: STUDENT IN AN ORGANIZED HEALTH CARE EDUCATION/TRAINING PROGRAM

## 2024-07-06 PROCEDURE — 97116 GAIT TRAINING THERAPY: CPT

## 2024-07-06 PROCEDURE — 85014 HEMATOCRIT: CPT

## 2024-07-06 PROCEDURE — 85025 COMPLETE CBC W/AUTO DIFF WBC: CPT

## 2024-07-06 PROCEDURE — 93010 ELECTROCARDIOGRAM REPORT: CPT | Performed by: INTERNAL MEDICINE

## 2024-07-06 PROCEDURE — 97535 SELF CARE MNGMENT TRAINING: CPT

## 2024-07-06 PROCEDURE — 6370000000 HC RX 637 (ALT 250 FOR IP): Performed by: STUDENT IN AN ORGANIZED HEALTH CARE EDUCATION/TRAINING PROGRAM

## 2024-07-06 PROCEDURE — 2060000000 HC ICU INTERMEDIATE R&B

## 2024-07-06 PROCEDURE — 51798 US URINE CAPACITY MEASURE: CPT

## 2024-07-06 RX ORDER — TORSEMIDE 20 MG/1
40 TABLET ORAL DAILY
Status: DISCONTINUED | OUTPATIENT
Start: 2024-07-06 | End: 2024-07-08

## 2024-07-06 RX ORDER — GLUCAGON 1 MG/ML
1 KIT INJECTION PRN
Status: DISCONTINUED | OUTPATIENT
Start: 2024-07-06 | End: 2024-07-08 | Stop reason: SDUPTHER

## 2024-07-06 RX ORDER — DEXTROSE MONOHYDRATE 100 MG/ML
INJECTION, SOLUTION INTRAVENOUS CONTINUOUS PRN
Status: DISCONTINUED | OUTPATIENT
Start: 2024-07-06 | End: 2024-07-08 | Stop reason: SDUPTHER

## 2024-07-06 RX ADMIN — INSULIN GLARGINE 7 UNITS: 100 INJECTION, SOLUTION SUBCUTANEOUS at 10:04

## 2024-07-06 RX ADMIN — TORSEMIDE 40 MG: 20 TABLET ORAL at 09:59

## 2024-07-06 RX ADMIN — AMLODIPINE BESYLATE 5 MG: 5 TABLET ORAL at 09:59

## 2024-07-06 RX ADMIN — OXYCODONE AND ACETAMINOPHEN 1 TABLET: 7.5; 325 TABLET ORAL at 21:30

## 2024-07-06 RX ADMIN — PALIPERIDONE 9 MG: 3 TABLET, EXTENDED RELEASE ORAL at 09:59

## 2024-07-06 RX ADMIN — GABAPENTIN 300 MG: 300 CAPSULE ORAL at 21:30

## 2024-07-06 RX ADMIN — MEROPENEM 500 MG: 500 INJECTION, POWDER, FOR SOLUTION INTRAVENOUS at 10:24

## 2024-07-06 RX ADMIN — ATORVASTATIN CALCIUM 40 MG: 40 TABLET, FILM COATED ORAL at 21:30

## 2024-07-06 RX ADMIN — GABAPENTIN 300 MG: 300 CAPSULE ORAL at 09:59

## 2024-07-06 RX ADMIN — SODIUM CHLORIDE, PRESERVATIVE FREE 10 ML: 5 INJECTION INTRAVENOUS at 21:39

## 2024-07-06 RX ADMIN — PANTOPRAZOLE SODIUM 40 MG: 40 TABLET, DELAYED RELEASE ORAL at 09:59

## 2024-07-06 RX ADMIN — MEROPENEM 500 MG: 500 INJECTION, POWDER, FOR SOLUTION INTRAVENOUS at 21:39

## 2024-07-06 RX ADMIN — PANTOPRAZOLE SODIUM 40 MG: 40 TABLET, DELAYED RELEASE ORAL at 14:33

## 2024-07-06 RX ADMIN — INSULIN LISPRO 8 UNITS: 100 INJECTION, SOLUTION INTRAVENOUS; SUBCUTANEOUS at 16:38

## 2024-07-06 RX ADMIN — SODIUM CHLORIDE, PRESERVATIVE FREE 10 ML: 5 INJECTION INTRAVENOUS at 10:05

## 2024-07-06 RX ADMIN — DEXTROSE MONOHYDRATE 250 ML: 100 INJECTION, SOLUTION INTRAVENOUS at 10:03

## 2024-07-06 RX ADMIN — ENOXAPARIN SODIUM 30 MG: 100 INJECTION SUBCUTANEOUS at 09:58

## 2024-07-06 RX ADMIN — INSULIN HUMAN 10 UNITS: 100 INJECTION, SOLUTION PARENTERAL at 10:00

## 2024-07-06 RX ADMIN — SODIUM ZIRCONIUM CYCLOSILICATE 10 G: 10 POWDER, FOR SUSPENSION ORAL at 09:58

## 2024-07-06 RX ADMIN — TRAZODONE HYDROCHLORIDE 100 MG: 50 TABLET ORAL at 21:30

## 2024-07-06 RX ADMIN — INSULIN LISPRO 4 UNITS: 100 INJECTION, SOLUTION INTRAVENOUS; SUBCUTANEOUS at 16:38

## 2024-07-06 ASSESSMENT — PAIN DESCRIPTION - FREQUENCY: FREQUENCY: CONTINUOUS

## 2024-07-06 ASSESSMENT — PAIN SCALES - WONG BAKER
WONGBAKER_NUMERICALRESPONSE: NO HURT

## 2024-07-06 ASSESSMENT — PAIN DESCRIPTION - LOCATION: LOCATION: BACK

## 2024-07-06 ASSESSMENT — PAIN DESCRIPTION - DESCRIPTORS: DESCRIPTORS: ACHING;DISCOMFORT

## 2024-07-06 ASSESSMENT — PAIN DESCRIPTION - ONSET: ONSET: ON-GOING

## 2024-07-06 ASSESSMENT — PAIN SCALES - GENERAL
PAINLEVEL_OUTOF10: 8
PAINLEVEL_OUTOF10: 0
PAINLEVEL_OUTOF10: 0

## 2024-07-06 ASSESSMENT — PAIN DESCRIPTION - PAIN TYPE: TYPE: CHRONIC PAIN

## 2024-07-06 ASSESSMENT — PAIN DESCRIPTION - ORIENTATION: ORIENTATION: LOWER

## 2024-07-06 ASSESSMENT — PAIN - FUNCTIONAL ASSESSMENT: PAIN_FUNCTIONAL_ASSESSMENT: PREVENTS OR INTERFERES SOME ACTIVE ACTIVITIES AND ADLS

## 2024-07-06 NOTE — PROGRESS NOTES
Ph: [phone], Fax: [phone]           [facility]               Reason for admission:                 Hyperglycemia, fluid overload       Interval History and plan:     The patient's lab work from this morning showed a potassium 5.4 and the potassium was 5 yesterday  The patient's serum creatinine was 2.2 and it was 2.2 yesterday as well  The patient's urine output for yesterday was 800  The patient has some leg edema  The patient most recent blood pressure was 146/50  I have reviewed her medications, she is on torsemide 20 mg p.o. once daily so I will increase her torsemide to 40 mg p.o. once daily  No other intervention needed from renal aspect.    The patient hyperkalemia could be from the use of low molecular weight heparin.    The patient was seen and examined in her room  She was sleeping  Arousable  She looked chronically ill but under no acute distress  She denies any pain or discomfort  Her most recent set of vital signs showed temperature 99 pulse rate 74 and blood pressure 146/50 pulse of 99%  Lab work from this morning showed sodium 133 potassium 5.4 bicarb 26 BUN 23 creatinine was 2.2 glucose was 148 calcium 7.5    WBC 7.8 hemoglobin 8.7 and the platelet 188.                  Assessment :     CKD  1.9 at the time of consult  1.9 on 4/25/2024 at the time of last hospital discharge  Creatinine peaked to 3.1 last hospitalization    Hypertension   BP: (120-146)/(50-53)  Pulse:  [74-81]   BP goal inpatient 130-140 systolic inpatient        Hyponatremia  Sodium 129 on presentation 130  Going up appropriately  Has history of recurrent hyponatremia                  [facility] would like to thank Herbie Randolph DO   for opportunity to serve this patient      Please call with questions at-   24 Hrs Answering service [phone] or  7 am- 5 pm via Perfect serve or cell phone  Dr.Shaoming Nghia MD       HPI :     Agustina Fried is a 64 y.o. female presented to   the  with cerebral infarction     TIA          Medication:     Current Facility-Administered Medications: [COMPLETED] meropenem (MERREM) 1,000 mg in sodium chloride 0.9 % 100 mL IVPB (mini-bag), 1,000 mg, IntraVENous, Once **FOLLOWED BY** meropenem (MERREM) 500 mg in sodium chloride 0.9 % 100 mL IVPB (mini-bag), 500 mg, IntraVENous, Q12H  insulin glargine (LANTUS) injection vial 7 Units, 7 Units, SubCUTAneous, Daily  0.9 % sodium chloride infusion, , IntraVENous, PRN  torsemide (DEMADEX) tablet 20 mg, 20 mg, Oral, Daily  amLODIPine (NORVASC) tablet 5 mg, 5 mg, Oral, Daily  glucose chewable tablet 16 g, 4 tablet, Oral, PRN  dextrose bolus 10% 125 mL, 125 mL, IntraVENous, PRN **OR** dextrose bolus 10% 250 mL, 250 mL, IntraVENous, PRN  glucagon injection 1 mg, 1 mg, SubCUTAneous, PRN  dextrose 10 % infusion, , IntraVENous, Continuous PRN  insulin lispro (HUMALOG,ADMELOG) injection vial 4 Units, 4 Units, SubCUTAneous, TID WC  insulin lispro (HUMALOG,ADMELOG) injection vial 0-16 Units, 0-16 Units, SubCUTAneous, TID WC  lidocaine 4 % external patch 1 patch, 1 patch, TransDERmal, Daily  gabapentin (NEURONTIN) capsule 300 mg, 300 mg, Oral, BID  oxyCODONE-acetaminophen (PERCOCET) 7.5-325 MG per tablet 1 tablet, 1 tablet, Oral, Q4H PRN  paliperidone (INVEGA) extended release tablet 9 mg, 9 mg, Oral, QAM  atorvastatin (LIPITOR) tablet 40 mg, 40 mg, Oral, Nightly  traZODone (DESYREL) tablet 100 mg, 100 mg, Oral, Nightly  pantoprazole (PROTONIX) tablet 40 mg, 40 mg, Oral, BID  sodium chloride flush 0.9 % injection 5-40 mL, 5-40 mL, IntraVENous, 2 times per day  sodium chloride flush 0.9 % injection 5-40 mL, 5-40 mL, IntraVENous, PRN  0.9 % sodium chloride infusion, , IntraVENous, PRN  enoxaparin Sodium (LOVENOX) injection 30 mg, 30 mg, SubCUTAneous, Daily  ondansetron (ZOFRAN-ODT) disintegrating tablet 4 mg, 4 mg, Oral, Q8H PRN **OR** ondansetron (ZOFRAN) injection 4 mg, 4 mg, IntraVENous, Q6H PRN  polyethylene glycol (GLYCOLAX)

## 2024-07-06 NOTE — PROGRESS NOTES
Position: Semi fowlers         Safety Devices  Type of Devices: All fall risk precautions in place;Bed alarm in place;Call light within reach;Left in bed;Patient at risk for falls;Nurse notified;Gait belt     Toilet Transfers  Toilet - Technique: Ambulating (RW)  Equipment Used: Raised toilet seat with rails  Toilet Transfer: Minimal assistance;2 Person assistance  Toilet Transfers Comments: Poor standing balance d/t BLE instability  AROM: Generally decreased, functional  Strength: Generally decreased, functional (3/5-3+/5)  Coordination: Generally decreased, functional (Tremors and jerking movements. Impaired fine and gross motor coord in BUE during ADLs.)  Tone: Normal  Sensation: Intact  ADL  Feeding: Independent  UE Dressing: Moderate assistance;Verbal cueing  UE Dressing Skilled Clinical Factors: for gown (Noted UE tremors)  LE Dressing: Maximum assistance  LE Dressing Skilled Clinical Factors: brief  Toileting: Dependent/Total  Toileting Skilled Clinical Factors: Purewick. Assist x2 for pericare after BM and assist for standing balance.  Additional Comments: B knees buckling as pt was standing at toilet for pericare.  Skin Care: Bath wipes     Activity Tolerance  Activity Tolerance: Patient tolerated evaluation without incident;Patient tolerated treatment well;Patient limited by fatigue;Patient limited by endurance  Bed mobility  Supine to Sit: Stand by assistance  Sit to Supine: Contact guard assistance  Transfers  Stand Pivot Transfers: Minimal assistance;2 Person assistance (Min x2 to transfer to chair d/t pt unsteady and attempting to sit too soon. BLE was shaking and buckling at knees.)  Sit to stand: Minimal assistance  Stand to sit: Minimal assistance  Vision  Vision: Impaired  Vision Exceptions:  (Has glasses but does not wear them)  Hearing  Hearing: Within functional limits  Cognition  Overall Cognitive Status: Exceptions  Arousal/Alertness: Appropriate responses to stimuli  Following Commands:  Follows one step commands with increased time;Follows one step commands with repetition  Attention Span: Attends with cues to redirect  Safety Judgement: Decreased awareness of need for safety  Problem Solving: Decreased awareness of errors  Insights: Decreased awareness of deficits  Sequencing: Requires cues for some  Orientation  Overall Orientation Status: Within Functional Limits  Orientation Level: Oriented X4      Education Given To: Patient  Education Provided: Role of Therapy;Plan of Care;Transfer Training;Equipment;Fall Prevention Strategies;Mobility Training;ADL Adaptive Strategies  Education Provided Comments: Pt educated on importance of OOB mobility, prevention of complications of bedrest, and general safety during hospitalization  Education Method: Verbal;Demonstration  Barriers to Learning: Cognition  Education Outcome: Verbalized understanding;Continued education needed   Attempted Therapy Heart Failure Education this date.   Pt inappropriate for education at this time due to :  [x]Cognition   []Medical Status   [x]Readiness to learn  []Refusal   []Language barrier  []Decreased vision/hearing    []No family present     Should this change during treatment, education will be initiated. If family/ pt's support system is present at subsequent therapy session, will attempt to initiate education.   AM-[facility] - ADL  AM-PAC Daily Activity - Inpatient   How much help is needed for putting on and taking off regular lower body clothing?: Total  How much help is needed for bathing (which includes washing, rinsing, drying)?: A Lot  How much help is needed for toileting (which includes using toilet, bedpan, or urinal)?: Total  How much help is needed for putting on and taking off regular upper body clothing?: A Lot  How much help is needed for taking care of personal grooming?: A Little  How much help for eating meals?: None  AM-[facility] Inpatient Daily Activity Raw Score: 13  AM-PAC Inpatient ADL T-Scale Score :

## 2024-07-06 NOTE — PROGRESS NOTES
Shift Summary    Admitting Dx:  SOB, Hyperglycemia - CHF    Shift Events:  Patient rested well overnight   PRN Percocet given for generalized pain.    Patient very unsteady walking to the bathroom - would benefit from PT/OT eval     Vitals:  Vitals:    07/05/24 2155 07/05/24 2344 07/06/24 0458 07/06/24 0557   BP:  (!) 120/53  (!) 146/50   Pulse:  81  81   Resp: 18 18  18   Temp:  98.1 °F (36.7 °C)  99.5 °F (37.5 °C)   TempSrc:  Axillary  Oral   SpO2:  94%  97%   Weight:   64.3 kg (141 lb 11.2 oz)    Height:            Wt Readings from Last 3 Encounters:   07/06/24 64.3 kg (141 lb 11.2 oz)   04/25/24 62.7 kg (138 lb 4.8 oz)   02/28/24 57.6 kg (127 lb)        Tele:  SR    IV/Line:  R AC PIV     Drains/Dooley:  N/A     Neuro:   A&Ox4     I/O:   I/O last 3 completed shifts:  In: 900 [P.O.:900]  Out: -     I/O this shift:  In: 593.9 [P.O.:240; Blood:353.9]  Out: 800 [Urine:800]

## 2024-07-06 NOTE — CARE COORDINATION
Per PT/OT patient will need SNF on d/c.  Spoke to patient.  She is agreeable.  She prefers [facility], unsure if they are in network, referral made.  List of in network SNF providers given and reviewed.  Referrals to [facility] also made.  Await response.  Patient will need precert. Electronically signed by MAYELA Arias on 7/6/2024 at 2:30 PM

## 2024-07-06 NOTE — PLAN OF CARE
CHF Care Plan      Patient's EF (Ejection Fraction) is greater than 40%    Heart Failure Medications:  Diuretics:: Torsemide    (One of the following REQUIRED for EF </= 40%/SYSTOLIC FAILURE but MAY be used in EF% >40%/DIASTOLIC FAILURE)        ACE:: None        ARB:: None         ARNI:: None    (Beta Blockers)  NON- Evidenced Based Beta Blocker (for EF% >40%/DIASTOLIC FAILURE): None    Evidenced Based Beta Blocker::(REQUIRED for EF% <40%/SYSTOLIC FAILURE) None  ...................................................................................................................................................    Failed to redirect to the Timeline version of the [employer] SmartLink.      Patient's weights and intake/output reviewed    Daily Weight log at bedside, patient/family participation in use of log: \"yes    Patient's current weight today shows a difference of 0 lbs less than last documented weight.      Intake/Output Summary (Last 24 hours) at 7/6/2024 1620  Last data filed at 7/6/2024 1306  Gross per 24 hour   Intake 1193.92 ml   Output 800 ml   Net 393.92 ml       Education Booklet Provided: yes    Comorbidities Reviewed Yes    Patient has a past medical history of Abnormal brain MRI, Acute bilateral low back pain without sciatica, SHARRON (acute kidney injury) ([facility]), Arthritis, Bipolar disorder ([facility]), CAD (coronary artery disease), Cancer (HCC), Carotid stenosis, bilateral:<50%:per [place] 7/2016, Carpal tunnel syndrome, Cervical cancer screening, Coronary artery disease of native artery of native heart with stable angina pectoris (HCC), DDD (degenerative disc disease), lumbar, Depression, Depression/anxiety, Depression/anxiety, Diabetes mellitus (HCC), Gout, History of mammogram, History of therapeutic radiation, Hyperlipidemia, Hypertension, 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     >>For CHF and Comorbidity documentation on Education Time and Topics, please see Education Tab      Pt resting in bed at this time on room air. Pt denies shortness of breath. Pt without lower extremity edema.     Patient and/or Family's stated Goal of Care this Admission: better understand heart failure and disease management prior to discharge    Pt will have accuchecks before meals and at bedtime with sliding scale insulin in place for coverage. Will continue to monitor for signs and symptoms of hypoglycemia and hyperglycemia throughout shift.     :

## 2024-07-06 NOTE — PROGRESS NOTES
CHF Care Plan      Patient's EF (Ejection Fraction) is greater than 40%    Heart Failure Medications:  Diuretics:: Torsemide    (One of the following REQUIRED for EF </= 40%/SYSTOLIC FAILURE but MAY be used in EF% >40%/DIASTOLIC FAILURE)        ACE:: None        ARB:: None         ARNI:: None    (Beta Blockers)  NON- Evidenced Based Beta Blocker (for EF% >40%/DIASTOLIC FAILURE): None    Evidenced Based Beta Blocker::(REQUIRED for EF% <40%/SYSTOLIC FAILURE) None  ...................................................................................................................................................    Failed to redirect to the Timeline version of the [employer] SmartLink.      Patient's weights and intake/output reviewed    Daily Weight log at bedside, patient/family participation in use of log: \"yes    Patient's current weight today shows a difference of 0 lbs more than last documented weight.      Intake/Output Summary (Last 24 hours) at 7/6/2024 0637  Last data filed at 7/6/2024 0557  Gross per 24 hour   Intake 1253.92 ml   Output 800 ml   Net 453.92 ml       Education Booklet Provided: yes    Comorbidities Reviewed Yes    Patient has a past medical history of Abnormal brain MRI, Acute bilateral low back pain without sciatica, SHARRON (acute kidney injury) ([facility]), Arthritis, Bipolar disorder ([facility]), CAD (coronary artery disease), Cancer (HCC), Carotid stenosis, bilateral:<50%:per [place] 7/2016, Carpal tunnel syndrome, Cervical cancer screening, Coronary artery disease of native artery of native heart with stable angina pectoris (HCC), DDD (degenerative disc disease), lumbar, Depression, Depression/anxiety, Depression/anxiety, Diabetes mellitus (HCC), Gout, History of mammogram, History of therapeutic radiation, Hyperlipidemia, Hypertension, 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     >>For CHF and Comorbidity documentation on Education Time and Topics, please see Education Tab      Pt resting in bed at this time on room air. Pt denies shortness of breath. Pt with pitting lower extremity edema.     Patient and/or Family's stated Goal of Care this Admission: reduce shortness of breath, increase activity tolerance, better understand heart failure and disease management, be more comfortable, and reduce lower extremity edema prior to discharge        :

## 2024-07-06 NOTE — PROGRESS NOTES
V2.0    [facility] Progress Note      Name:  Agustina Fried /Age/Sex: 1960  (64 y.o. female)   MRN & CSN:  [id number] & [id number] Encounter Date/Time: 2024 12:50 PM EDT   Location:   PCP: Viridiana Blanco APRN - NP     Attending:Herbie Randolph DO       Hospital Day: 6    Assessment and Recommendations   Agustina Fried is a 64 y.o. female with a pmh of hypertension, diabetes mellitus, GERD, hyperlipidemia, who presents with Hyperglycemia due to diabetes mellitus (HCC)     Acute on chronic diastolic heart failure  ESBL Ecoli UTI  Acute on chronic normocytic anemia  DM 2, uncontrolled with hyperglycemia  Hyperglycemia, resolved  Lactic acidosis  Hypertension  Hyperlipidemia  GERD  Chronic anemia  CKD stage IIIb    Plan:     Appreciates Nephrology input  Patient received 3-day course of meropenem however given lack of systemic signs of infection or evidence of pyelonephritis despite having ESBL I will de-escalate patient to Bactrim which is a reasonable choice at reduced dose given her creatinine clearance upon DC.  Patient medically ready to be discharged. Awaiting PT/OT recs, patient reports she feels week not safe to go home today.   Daily weight is  Strict I's and O's  Cont Lantus  7 units  Patient required 1 unit of blood transfusion yesterday, HGB has remained relatively stable today.  No evidence of blood loss.  I believe this is most likely due to CKD 3B.  She shows evidence of hypoproliferation based on reticulocyte count.  Will measure EPO level tomorrow iron studies was done recently and showed ferritin of about 300. B12 and folate adequate   Continue other home medication for her comorbidities  Labs in a.m.              Comment: Please note this report has been produced using speech recognition software and may contain errors related to that system including errors in grammar, punctuation, and spelling, as well as words and phrases that may be inappropriate. If there are any questions

## 2024-07-06 NOTE — PLAN OF CARE
Problem: Discharge Planning  Goal: Discharge to home or other facility with appropriate resources  Outcome: Progressing     Problem: Safety - Adult  Goal: Free from fall injury  Outcome: Progressing     Problem: ABCDS Injury Assessment  Goal: Absence of physical injury  Outcome: Progressing     Problem: Skin/Tissue Integrity  Goal: Absence of new skin breakdown  Description: 1.  Monitor for areas of redness and/or skin breakdown  2.  Assess vascular access sites hourly  3.  Every 4-6 hours minimum:  Change oxygen saturation probe site  4.  Every 4-6 hours:  If on nasal continuous positive airway pressure, respiratory therapy assess nares and determine need for appliance change or resting period.  Outcome: Progressing     Problem: Pain  Goal: Verbalizes/displays adequate comfort level or baseline comfort level  Outcome: Progressing     Problem: Chronic Conditions and Co-morbidities  Goal: Patient's chronic conditions and co-morbidity symptoms are monitored and maintained or improved  7/6/2024 0206 by Belinda Aquino, RN  Outcome: Progressing  7/5/2024 1253 by Jennifer Hobson, RN  Outcome: Progressing

## 2024-07-06 NOTE — PROGRESS NOTES
cerebral artery occlusion with cerebral infarction.  Family / Caregiver Present: No  Referring Practitioner: Peter  Referral Date : 07/06/24  Diagnosis: Hypergylcemia, Hyponatremia,chronic CKD  Follows Commands: Within Functional Limits  General Comment  Comments: RN cleared pt for therapy, pt resting in bed on approach  Subjective  Subjective: Pt agrees to therapy. Pt states she is tired and frequently closes eyes. Pain: pt denies.         Social/Functional History  Social/Functional History  Lives With: Alone  Type of Home: Apartment  Home Layout: One level  Home Access: Stairs to enter with rails  Entrance Stairs - Number of Steps: 3 flights of steps  Bathroom Shower/Tub: Tub/Shower unit  Bathroom Equipment: Tub transfer bench  Has the patient had two or more falls in the past year or any fall with injury in the past year?: Yes (Fell 2x in one day. Reports that she scraped her knees.)  ADL Assistance: Independent  Homemaking Assistance: Needs assistance (Friend helps)  Ambulation Assistance: Independent (no device)  Transfer Assistance: Independent  Active : No  Patient's  Info: Friend  Leisure & Hobbies: Sleep and going to doctors  Vision/Hearing  Vision  Vision: Impaired  Hearing  Hearing: Within functional limits    Cognition   Orientation  Overall Orientation Status: Within Functional Limits  Orientation Level: Oriented X4  Cognition  Overall Cognitive Status: Exceptions  Arousal/Alertness: Appropriate responses to stimuli  Following Commands: Follows one step commands with increased time;Follows one step commands with repetition  Attention Span: Attends with cues to redirect  Safety Judgement: Decreased awareness of need for safety  Problem Solving: Decreased awareness of errors  Insights: Decreased awareness of deficits  Sequencing: Requires cues for some     Objective   Temp: 98.2 °F (36.8 °C)  Pulse: 90  Heart Rate Source: Monitor  Respirations: 18  SpO2: 100 %  O2 Device: None (Room  mobility modified indep  Short Term Goal 2: pt to perform transfers SBA  Short Term Goal 3: pt to amb with RW at least 50 ft with CG  Short Term Goal 4: pt to participate in therapeutic ex 12-15 reps by 7/10  Short Term Goal 5: Pt to meet at least 3/5 HF program goals  Patient Goals   Patient Goals : \"I don't know what's wrong with me, I hope I get stronger\"       Education  Patient Education  Education Given To: Patient  Education Provided: Role of Therapy;Plan of Care;Transfer Training;Fall Prevention Strategies;Energy Conservation  Education Provided Comments: general safety, prevention of complications of bedrest, initiated CHF education with ther ex and discussion of importance of daily weighing  Education Method: Verbal  Barriers to Learning: None  Education Outcome: Verbalized understanding;Continued education needed      Therapy Time   Individual Concurrent Group Co-treatment   Time In 1330         Time Out 1405         Minutes 35         Timed Code Treatment Minutes: 23 Minutes       Cecily Hess, PT

## 2024-07-07 LAB
ANION GAP SERPL CALCULATED.3IONS-SCNC: 4 MMOL/L (ref 3–16)
BASOPHILS # BLD: 0 K/UL (ref 0–0.2)
BASOPHILS NFR BLD: 0.3 %
BUN SERPL-MCNC: 27 MG/DL (ref 7–20)
CALCIUM SERPL-MCNC: 7.4 MG/DL (ref 8.3–10.6)
CHLORIDE SERPL-SCNC: 103 MMOL/L (ref 99–110)
CO2 SERPL-SCNC: 29 MMOL/L (ref 21–32)
CREAT SERPL-MCNC: 2.6 MG/DL (ref 0.6–1.2)
DEPRECATED RDW RBC AUTO: 14.9 % (ref 12.4–15.4)
EOSINOPHIL # BLD: 0.1 K/UL (ref 0–0.6)
EOSINOPHIL NFR BLD: 0.8 %
EPO SERPL-ACNC: 18 MU/ML (ref 4–27)
GFR SERPLBLD CREATININE-BSD FMLA CKD-EPI: 20 ML/MIN/{1.73_M2}
GLUCOSE BLD-MCNC: 126 MG/DL (ref 70–99)
GLUCOSE BLD-MCNC: 128 MG/DL (ref 70–99)
GLUCOSE BLD-MCNC: 142 MG/DL (ref 70–99)
GLUCOSE BLD-MCNC: 176 MG/DL (ref 70–99)
GLUCOSE BLD-MCNC: 75 MG/DL (ref 70–99)
GLUCOSE SERPL-MCNC: 66 MG/DL (ref 70–99)
HCT VFR BLD AUTO: 27 % (ref 36–48)
HGB BLD-MCNC: 8.8 G/DL (ref 12–16)
LYMPHOCYTES # BLD: 1.9 K/UL (ref 1–5.1)
LYMPHOCYTES NFR BLD: 22 %
MAGNESIUM SERPL-MCNC: 2 MG/DL (ref 1.8–2.4)
MCH RBC QN AUTO: 29.8 PG (ref 26–34)
MCHC RBC AUTO-ENTMCNC: 32.7 G/DL (ref 31–36)
MCV RBC AUTO: 90.9 FL (ref 80–100)
MONOCYTES # BLD: 0.9 K/UL (ref 0–1.3)
MONOCYTES NFR BLD: 10.8 %
NEUTROPHILS # BLD: 5.6 K/UL (ref 1.7–7.7)
NEUTROPHILS NFR BLD: 66.1 %
PERFORMED ON: ABNORMAL
PERFORMED ON: NORMAL
PLATELET # BLD AUTO: 215 K/UL (ref 135–450)
PMV BLD AUTO: 8.8 FL (ref 5–10.5)
POTASSIUM SERPL-SCNC: 5.8 MMOL/L (ref 3.5–5.1)
RBC # BLD AUTO: 2.97 M/UL (ref 4–5.2)
SODIUM SERPL-SCNC: 136 MMOL/L (ref 136–145)
WBC # BLD AUTO: 8.5 K/UL (ref 4–11)

## 2024-07-07 PROCEDURE — 6360000002 HC RX W HCPCS: Performed by: STUDENT IN AN ORGANIZED HEALTH CARE EDUCATION/TRAINING PROGRAM

## 2024-07-07 PROCEDURE — 2500000003 HC RX 250 WO HCPCS: Performed by: STUDENT IN AN ORGANIZED HEALTH CARE EDUCATION/TRAINING PROGRAM

## 2024-07-07 PROCEDURE — 2060000000 HC ICU INTERMEDIATE R&B

## 2024-07-07 PROCEDURE — 2580000003 HC RX 258: Performed by: STUDENT IN AN ORGANIZED HEALTH CARE EDUCATION/TRAINING PROGRAM

## 2024-07-07 PROCEDURE — 6370000000 HC RX 637 (ALT 250 FOR IP): Performed by: INTERNAL MEDICINE

## 2024-07-07 PROCEDURE — 80048 BASIC METABOLIC PNL TOTAL CA: CPT

## 2024-07-07 PROCEDURE — 83735 ASSAY OF MAGNESIUM: CPT

## 2024-07-07 PROCEDURE — 85025 COMPLETE CBC W/AUTO DIFF WBC: CPT

## 2024-07-07 PROCEDURE — 6370000000 HC RX 637 (ALT 250 FOR IP): Performed by: STUDENT IN AN ORGANIZED HEALTH CARE EDUCATION/TRAINING PROGRAM

## 2024-07-07 PROCEDURE — 36415 COLL VENOUS BLD VENIPUNCTURE: CPT

## 2024-07-07 PROCEDURE — 6370000000 HC RX 637 (ALT 250 FOR IP): Performed by: NURSE PRACTITIONER

## 2024-07-07 RX ORDER — TAMSULOSIN HYDROCHLORIDE 0.4 MG/1
0.4 CAPSULE ORAL DAILY
Status: DISCONTINUED | OUTPATIENT
Start: 2024-07-07 | End: 2024-07-12 | Stop reason: HOSPADM

## 2024-07-07 RX ORDER — CALCIUM GLUCONATE 20 MG/ML
1000 INJECTION, SOLUTION INTRAVENOUS ONCE
Status: COMPLETED | OUTPATIENT
Start: 2024-07-07 | End: 2024-07-07

## 2024-07-07 RX ORDER — DEXTROSE MONOHYDRATE 100 MG/ML
INJECTION, SOLUTION INTRAVENOUS CONTINUOUS PRN
Status: DISCONTINUED | OUTPATIENT
Start: 2024-07-07 | End: 2024-07-12 | Stop reason: HOSPADM

## 2024-07-07 RX ORDER — GLUCAGON 1 MG/ML
1 KIT INJECTION PRN
Status: DISCONTINUED | OUTPATIENT
Start: 2024-07-07 | End: 2024-07-12 | Stop reason: HOSPADM

## 2024-07-07 RX ADMIN — GABAPENTIN 300 MG: 300 CAPSULE ORAL at 20:16

## 2024-07-07 RX ADMIN — PALIPERIDONE 9 MG: 3 TABLET, EXTENDED RELEASE ORAL at 10:03

## 2024-07-07 RX ADMIN — ATORVASTATIN CALCIUM 40 MG: 40 TABLET, FILM COATED ORAL at 20:16

## 2024-07-07 RX ADMIN — ONDANSETRON 4 MG: 2 INJECTION INTRAMUSCULAR; INTRAVENOUS at 14:26

## 2024-07-07 RX ADMIN — PANTOPRAZOLE SODIUM 40 MG: 40 TABLET, DELAYED RELEASE ORAL at 10:03

## 2024-07-07 RX ADMIN — INSULIN HUMAN 10 UNITS: 100 INJECTION, SOLUTION PARENTERAL at 10:00

## 2024-07-07 RX ADMIN — CALCIUM GLUCONATE 1000 MG: 20 INJECTION, SOLUTION INTRAVENOUS at 10:02

## 2024-07-07 RX ADMIN — TRAZODONE HYDROCHLORIDE 100 MG: 50 TABLET ORAL at 20:17

## 2024-07-07 RX ADMIN — OXYCODONE AND ACETAMINOPHEN 1 TABLET: 7.5; 325 TABLET ORAL at 14:26

## 2024-07-07 RX ADMIN — ENOXAPARIN SODIUM 30 MG: 100 INJECTION SUBCUTANEOUS at 10:03

## 2024-07-07 RX ADMIN — TAMSULOSIN HYDROCHLORIDE 0.4 MG: 0.4 CAPSULE ORAL at 10:12

## 2024-07-07 RX ADMIN — SODIUM CHLORIDE, PRESERVATIVE FREE 10 ML: 5 INJECTION INTRAVENOUS at 10:09

## 2024-07-07 RX ADMIN — TORSEMIDE 40 MG: 20 TABLET ORAL at 10:03

## 2024-07-07 RX ADMIN — MEROPENEM 500 MG: 500 INJECTION, POWDER, FOR SOLUTION INTRAVENOUS at 10:14

## 2024-07-07 RX ADMIN — MEROPENEM 500 MG: 500 INJECTION, POWDER, FOR SOLUTION INTRAVENOUS at 20:29

## 2024-07-07 RX ADMIN — DEXTROSE MONOHYDRATE 250 ML: 100 INJECTION, SOLUTION INTRAVENOUS at 09:32

## 2024-07-07 RX ADMIN — PANTOPRAZOLE SODIUM 40 MG: 40 TABLET, DELAYED RELEASE ORAL at 14:26

## 2024-07-07 RX ADMIN — GABAPENTIN 300 MG: 300 CAPSULE ORAL at 10:03

## 2024-07-07 RX ADMIN — AMLODIPINE BESYLATE 5 MG: 5 TABLET ORAL at 10:03

## 2024-07-07 ASSESSMENT — PAIN SCALES - GENERAL
PAINLEVEL_OUTOF10: 0
PAINLEVEL_OUTOF10: 6
PAINLEVEL_OUTOF10: 6
PAINLEVEL_OUTOF10: 0

## 2024-07-07 ASSESSMENT — PAIN SCALES - WONG BAKER
WONGBAKER_NUMERICALRESPONSE: NO HURT

## 2024-07-07 ASSESSMENT — PAIN DESCRIPTION - LOCATION: LOCATION: BACK

## 2024-07-07 NOTE — PLAN OF CARE
CHF Care Plan      Patient's EF (Ejection Fraction) is greater than 40%    Heart Failure Medications:  Diuretics:: Torsemide    (One of the following REQUIRED for EF </= 40%/SYSTOLIC FAILURE but MAY be used in EF% >40%/DIASTOLIC FAILURE)        ACE:: None        ARB:: None         ARNI:: None    (Beta Blockers)  NON- Evidenced Based Beta Blocker (for EF% >40%/DIASTOLIC FAILURE): None    Evidenced Based Beta Blocker::(REQUIRED for EF% <40%/SYSTOLIC FAILURE) None  ...................................................................................................................................................    Failed to redirect to the Timeline version of the Not iT SmartLink.      Patient's weights and intake/output reviewed    Daily Weight log at bedside, patient/family participation in use of log: \"yes    Patient's current weight today shows a difference of 5 lbs less than last documented weight.      Intake/Output Summary (Last 24 hours) at 7/7/2024 1040  Last data filed at 7/7/2024 0700  Gross per 24 hour   Intake 720 ml   Output 1100 ml   Net -380 ml       Education Booklet Provided: yes    Comorbidities Reviewed Yes    Patient has a past medical history of Abnormal brain MRI, Acute bilateral low back pain without sciatica, SHARRON (acute kidney injury) ([facility]), Arthritis, Bipolar disorder ([facility]), CAD (coronary artery disease), Cancer (HCC), Carotid stenosis, bilateral:<50%:per [place] 7/2016, Carpal tunnel syndrome, Cervical cancer screening, Coronary artery disease of native artery of native heart with stable angina pectoris (HCC), DDD (degenerative disc disease), lumbar, Depression, Depression/anxiety, Depression/anxiety, Diabetes mellitus (HCC), Gout, History of mammogram, History of therapeutic radiation, Hyperlipidemia, Hypertension, 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     >>For CHF and Comorbidity documentation on Education Time and Topics, please see Education Tab      Pt resting in bed at this time on room air. Pt denies shortness of breath. Pt without lower extremity edema.     Patient and/or Family's stated Goal of Care this Admission: better understand heart failure and disease management prior to discharge     Pt will have accuchecks before meals and at bedtime with sliding scale insulin in place for coverage. Will continue to monitor for signs and symptoms of hypoglycemia and hyperglycemia throughout shift.     :

## 2024-07-07 NOTE — PROGRESS NOTES
Ph: [phone], Fax: [phone]           [facility]               Reason for admission:                 Hyperglycemia, fluid overload       Interval History and plan:     The patient's potassium level was 5.8 this morning and bicarb 29 creatinine was 2.6, the patient's creatinine was 2.2 for few days so this is an increase in serum creatinine  The patient's blood pressure was 117/49  No leg edema  She is retaining some urine, the bladder scan showed a volume of 809 and straight cath with drainage of 600 mL    The postvoid residual was 285      Reviewed her medication list and I did not see any obvious nephrotoxic medication  I will request bladder scan    The patient got 1 dose of Lokelma yesterday and also today    So I will give her another dose of Lokelma this afternoon    I will add Flomax    The patient was seen and examined in her room  She was resting in her bed  She looked up pale and frail but she was not under any acute distress  She complains of some abdominal discomfort  On exam, her abdomen was benign she had no leg edema    Her most recent set of vital signs showed temperature 97.9 heart rate 50 and blood pressure 170/49 pulse was 97% on room air    Lab work from this morning showed sodium 136 potassium 5.8 bicarb 29 BUN 27 creatinine 2.6 glucose 66 calcium 7.4                  Assessment :     CKD  1.9 at the time of consult  1.9 on 4/25/2024 at the time of last hospital discharge  Creatinine peaked to 3.1 last hospitalization    Hypertension   BP: (117)/(49)  Pulse:  [59]   BP goal inpatient 130-140 systolic inpatient        Hyponatremia  Sodium 129 on presentation 130  Going up appropriately  Has history of recurrent hyponatremia                  [facility] would like to thank Herbie Randolph DO   for opportunity to serve this patient      Please call with questions at-   24 Hrs Answering service [phone] or  7 am- 5 pm via Perfect serve or cell  Units, 4 Units, SubCUTAneous, TID WC  insulin lispro (HUMALOG,ADMELOG) injection vial 0-16 Units, 0-16 Units, SubCUTAneous, TID WC  lidocaine 4 % external patch 1 patch, 1 patch, TransDERmal, Daily  gabapentin (NEURONTIN) capsule 300 mg, 300 mg, Oral, BID  oxyCODONE-acetaminophen (PERCOCET) 7.5-325 MG per tablet 1 tablet, 1 tablet, Oral, Q4H PRN  paliperidone (INVEGA) extended release tablet 9 mg, 9 mg, Oral, QAM  atorvastatin (LIPITOR) tablet 40 mg, 40 mg, Oral, Nightly  traZODone (DESYREL) tablet 100 mg, 100 mg, Oral, Nightly  pantoprazole (PROTONIX) tablet 40 mg, 40 mg, Oral, BID  sodium chloride flush 0.9 % injection 5-40 mL, 5-40 mL, IntraVENous, 2 times per day  sodium chloride flush 0.9 % injection 5-40 mL, 5-40 mL, IntraVENous, PRN  0.9 % sodium chloride infusion, , IntraVENous, PRN  enoxaparin Sodium (LOVENOX) injection 30 mg, 30 mg, SubCUTAneous, Daily  ondansetron (ZOFRAN-ODT) disintegrating tablet 4 mg, 4 mg, Oral, Q8H PRN **OR** ondansetron (ZOFRAN) injection 4 mg, 4 mg, IntraVENous, Q6H PRN  polyethylene glycol (GLYCOLAX) packet 17 g, 17 g, Oral, Daily PRN  acetaminophen (TYLENOL) tablet 650 mg, 650 mg, Oral, Q6H PRN **OR** acetaminophen (TYLENOL) suppository 650 mg, 650 mg, Rectal, Q6H PRN  perflutren lipid microspheres (DEFINITY) injection 1.5 mL, 1.5 mL, IntraVENous, ONCE PRN    Vitals :     Vitals:    07/07/24 0730   BP: (!) 117/49   Pulse: 59   Resp: 18   Temp:    SpO2: 97%          Physical Examination :     appearance: Alert, orientated  Respiratory: no distress  Cardiovascular: no visibly  raised JVD, Edema 2- 3+   Abdomen: -  soft  Other relevant findings: -      Labs :     CBC:   Recent Labs     07/05/24  0452 07/05/24  1041 07/06/24  0548 07/06/24  1123 07/07/24  0549   WBC 5.7  --  7.8  --  8.5   HGB 6.9*   < > 8.7* 8.4* 8.8*   HCT 21.5*   < > 26.5* 25.5* 27.0*     --  188  --  215    < > = values in this interval not displayed.       BMP:    Recent Labs     07/05/24  0453

## 2024-07-07 NOTE — PROGRESS NOTES
V2.0    [facility] [place]      Name:  Agustina Fried /Age/Sex: 1960  (64 y.o. female)   MRN & CSN:  [id number] & [id number] Encounter Date/Time: 2024 12:50 PM EDT   Location:   PCP: Viridiana Blanco, APRN - NP     Attending:Herbie Randolph DO       Hospital Day: 7    Assessment and Recommendations   Agustina Fried is a 64 y.o. female with a pmh of hypertension, diabetes mellitus, GERD, hyperlipidemia, who presents with Hyperglycemia due to diabetes mellitus (HCC)     Acute on chronic diastolic heart failure  ESBL Ecoli UTI  Acute on chronic normocytic anemia  DM 2, uncontrolled with hyperglycemia  Hyperglycemia, resolved  Lactic acidosis  Hypertension  Hyperlipidemia  GERD  Chronic anemia  CKD stage IIIb    Plan:     Appreciates Nephrology input  Patient received 4-day course of meropenem however given lack of systemic signs of infection or evidence of pyelonephritis despite having ESBL I will de-escalate patient to Bactrim which is a reasonable choice at reduced dose given her creatinine clearance upon DC. How ever tomorrow is the last day of Abx.   Treated Hyper K.  SHARRON worsened today, most likely degree of pos,Obstructive with urinary retention, had to be straight cathed. Started Flomax by Nephro, will monitor.    Daily weight is  Strict I's and O's  Cont Lantus  7 units  Patient required 1 unit of blood transfusion yesterday, HGB has remained relatively stable today.  No evidence of blood loss.  I believe this is most likely due to CKD 3B.  She shows evidence of hypoproliferation based on reticulocyte count.   iron studies was done recently and showed ferritin of about 300. B12 and folate adequate EPO abnormally low, Hgvb continues to drop would likely benefit from EPO. But would defer to nephro.   Continue other home medication for her comorbidities  Labs in a.m.  PT recommend rehab, awaiting placement.               Comment: Please note this report has been produced using speech

## 2024-07-08 LAB
ANION GAP SERPL CALCULATED.3IONS-SCNC: 6 MMOL/L (ref 3–16)
BASOPHILS # BLD: 0 K/UL (ref 0–0.2)
BASOPHILS NFR BLD: 0.4 %
BUN SERPL-MCNC: 30 MG/DL (ref 7–20)
CALCIUM SERPL-MCNC: 7.8 MG/DL (ref 8.3–10.6)
CHLORIDE SERPL-SCNC: 99 MMOL/L (ref 99–110)
CO2 SERPL-SCNC: 28 MMOL/L (ref 21–32)
CREAT SERPL-MCNC: 2.6 MG/DL (ref 0.6–1.2)
DEPRECATED RDW RBC AUTO: 15.6 % (ref 12.4–15.4)
EOSINOPHIL # BLD: 0.1 K/UL (ref 0–0.6)
EOSINOPHIL NFR BLD: 0.6 %
GFR SERPLBLD CREATININE-BSD FMLA CKD-EPI: 20 ML/MIN/{1.73_M2}
GLUCOSE BLD-MCNC: 123 MG/DL (ref 70–99)
GLUCOSE BLD-MCNC: 126 MG/DL (ref 70–99)
GLUCOSE BLD-MCNC: 207 MG/DL (ref 70–99)
GLUCOSE BLD-MCNC: 303 MG/DL (ref 70–99)
GLUCOSE BLD-MCNC: 43 MG/DL (ref 70–99)
GLUCOSE BLD-MCNC: 44 MG/DL (ref 70–99)
GLUCOSE BLD-MCNC: 49 MG/DL (ref 70–99)
GLUCOSE BLD-MCNC: 57 MG/DL (ref 70–99)
GLUCOSE BLD-MCNC: 82 MG/DL (ref 70–99)
GLUCOSE SERPL-MCNC: 224 MG/DL (ref 70–99)
HCT VFR BLD AUTO: 26.9 % (ref 36–48)
HGB BLD-MCNC: 8.7 G/DL (ref 12–16)
LYMPHOCYTES # BLD: 1.8 K/UL (ref 1–5.1)
LYMPHOCYTES NFR BLD: 22 %
MAGNESIUM SERPL-MCNC: 1.8 MG/DL (ref 1.8–2.4)
MCH RBC QN AUTO: 29.6 PG (ref 26–34)
MCHC RBC AUTO-ENTMCNC: 32.2 G/DL (ref 31–36)
MCV RBC AUTO: 91.9 FL (ref 80–100)
MONOCYTES # BLD: 0.8 K/UL (ref 0–1.3)
MONOCYTES NFR BLD: 9 %
NEUTROPHILS # BLD: 5.7 K/UL (ref 1.7–7.7)
NEUTROPHILS NFR BLD: 68 %
PERFORMED ON: ABNORMAL
PERFORMED ON: NORMAL
PLATELET # BLD AUTO: 167 K/UL (ref 135–450)
PMV BLD AUTO: 9.9 FL (ref 5–10.5)
POTASSIUM SERPL-SCNC: 5.4 MMOL/L (ref 3.5–5.1)
RBC # BLD AUTO: 2.93 M/UL (ref 4–5.2)
SODIUM SERPL-SCNC: 133 MMOL/L (ref 136–145)
WBC # BLD AUTO: 8.3 K/UL (ref 4–11)

## 2024-07-08 PROCEDURE — 6370000000 HC RX 637 (ALT 250 FOR IP): Performed by: INTERNAL MEDICINE

## 2024-07-08 PROCEDURE — 6360000002 HC RX W HCPCS: Performed by: STUDENT IN AN ORGANIZED HEALTH CARE EDUCATION/TRAINING PROGRAM

## 2024-07-08 PROCEDURE — 83735 ASSAY OF MAGNESIUM: CPT

## 2024-07-08 PROCEDURE — 97116 GAIT TRAINING THERAPY: CPT

## 2024-07-08 PROCEDURE — 97110 THERAPEUTIC EXERCISES: CPT

## 2024-07-08 PROCEDURE — 80048 BASIC METABOLIC PNL TOTAL CA: CPT

## 2024-07-08 PROCEDURE — 6370000000 HC RX 637 (ALT 250 FOR IP): Performed by: STUDENT IN AN ORGANIZED HEALTH CARE EDUCATION/TRAINING PROGRAM

## 2024-07-08 PROCEDURE — 83550 IRON BINDING TEST: CPT

## 2024-07-08 PROCEDURE — 36415 COLL VENOUS BLD VENIPUNCTURE: CPT

## 2024-07-08 PROCEDURE — 2060000000 HC ICU INTERMEDIATE R&B

## 2024-07-08 PROCEDURE — 2580000003 HC RX 258: Performed by: STUDENT IN AN ORGANIZED HEALTH CARE EDUCATION/TRAINING PROGRAM

## 2024-07-08 PROCEDURE — 6370000000 HC RX 637 (ALT 250 FOR IP): Performed by: NURSE PRACTITIONER

## 2024-07-08 PROCEDURE — 97530 THERAPEUTIC ACTIVITIES: CPT

## 2024-07-08 PROCEDURE — 85025 COMPLETE CBC W/AUTO DIFF WBC: CPT

## 2024-07-08 PROCEDURE — 83540 ASSAY OF IRON: CPT

## 2024-07-08 RX ORDER — TORSEMIDE 20 MG/1
20 TABLET ORAL DAILY
Status: DISCONTINUED | OUTPATIENT
Start: 2024-07-08 | End: 2024-07-11

## 2024-07-08 RX ORDER — INSULIN LISPRO 100 [IU]/ML
6 INJECTION, SOLUTION INTRAVENOUS; SUBCUTANEOUS
Status: DISCONTINUED | OUTPATIENT
Start: 2024-07-08 | End: 2024-07-11

## 2024-07-08 RX ORDER — INSULIN GLARGINE 100 [IU]/ML
9 INJECTION, SOLUTION SUBCUTANEOUS DAILY
Status: DISCONTINUED | OUTPATIENT
Start: 2024-07-09 | End: 2024-07-12 | Stop reason: HOSPADM

## 2024-07-08 RX ORDER — GABAPENTIN 100 MG/1
200 CAPSULE ORAL 2 TIMES DAILY
Status: DISCONTINUED | OUTPATIENT
Start: 2024-07-08 | End: 2024-07-12 | Stop reason: HOSPADM

## 2024-07-08 RX ADMIN — SODIUM CHLORIDE, PRESERVATIVE FREE 10 ML: 5 INJECTION INTRAVENOUS at 21:21

## 2024-07-08 RX ADMIN — INSULIN GLARGINE 7 UNITS: 100 INJECTION, SOLUTION SUBCUTANEOUS at 09:15

## 2024-07-08 RX ADMIN — INSULIN LISPRO 12 UNITS: 100 INJECTION, SOLUTION INTRAVENOUS; SUBCUTANEOUS at 11:54

## 2024-07-08 RX ADMIN — OXYCODONE AND ACETAMINOPHEN 1 TABLET: 7.5; 325 TABLET ORAL at 16:18

## 2024-07-08 RX ADMIN — MEROPENEM 500 MG: 500 INJECTION, POWDER, FOR SOLUTION INTRAVENOUS at 21:21

## 2024-07-08 RX ADMIN — PALIPERIDONE 9 MG: 3 TABLET, EXTENDED RELEASE ORAL at 09:13

## 2024-07-08 RX ADMIN — SODIUM ZIRCONIUM CYCLOSILICATE 10 G: 10 POWDER, FOR SUSPENSION ORAL at 09:13

## 2024-07-08 RX ADMIN — INSULIN LISPRO 4 UNITS: 100 INJECTION, SOLUTION INTRAVENOUS; SUBCUTANEOUS at 11:54

## 2024-07-08 RX ADMIN — TRAZODONE HYDROCHLORIDE 100 MG: 50 TABLET ORAL at 21:21

## 2024-07-08 RX ADMIN — INSULIN LISPRO 4 UNITS: 100 INJECTION, SOLUTION INTRAVENOUS; SUBCUTANEOUS at 09:14

## 2024-07-08 RX ADMIN — TORSEMIDE 20 MG: 20 TABLET ORAL at 09:13

## 2024-07-08 RX ADMIN — MEROPENEM 500 MG: 500 INJECTION, POWDER, FOR SOLUTION INTRAVENOUS at 09:21

## 2024-07-08 RX ADMIN — ATORVASTATIN CALCIUM 40 MG: 40 TABLET, FILM COATED ORAL at 21:21

## 2024-07-08 RX ADMIN — ENOXAPARIN SODIUM 30 MG: 100 INJECTION SUBCUTANEOUS at 09:13

## 2024-07-08 RX ADMIN — TAMSULOSIN HYDROCHLORIDE 0.4 MG: 0.4 CAPSULE ORAL at 09:13

## 2024-07-08 RX ADMIN — GABAPENTIN 200 MG: 100 CAPSULE ORAL at 21:21

## 2024-07-08 RX ADMIN — INSULIN LISPRO 4 UNITS: 100 INJECTION, SOLUTION INTRAVENOUS; SUBCUTANEOUS at 09:15

## 2024-07-08 RX ADMIN — GABAPENTIN 200 MG: 100 CAPSULE ORAL at 09:13

## 2024-07-08 ASSESSMENT — PAIN SCALES - WONG BAKER
WONGBAKER_NUMERICALRESPONSE: NO HURT

## 2024-07-08 ASSESSMENT — PAIN DESCRIPTION - ORIENTATION: ORIENTATION: MID

## 2024-07-08 ASSESSMENT — PAIN DESCRIPTION - DESCRIPTORS: DESCRIPTORS: ACHING

## 2024-07-08 ASSESSMENT — PAIN DESCRIPTION - LOCATION: LOCATION: BACK

## 2024-07-08 ASSESSMENT — PAIN SCALES - GENERAL
PAINLEVEL_OUTOF10: 0
PAINLEVEL_OUTOF10: 0
PAINLEVEL_OUTOF10: 7
PAINLEVEL_OUTOF10: 0

## 2024-07-08 NOTE — PROGRESS NOTES
chloride flush 0.9 % injection 5-40 mL, 5-40 mL, IntraVENous, 2 times per day  sodium chloride flush 0.9 % injection 5-40 mL, 5-40 mL, IntraVENous, PRN  0.9 % sodium chloride infusion, , IntraVENous, PRN  enoxaparin Sodium (LOVENOX) injection 30 mg, 30 mg, SubCUTAneous, Daily  ondansetron (ZOFRAN-ODT) disintegrating tablet 4 mg, 4 mg, Oral, Q8H PRN **OR** ondansetron (ZOFRAN) injection 4 mg, 4 mg, IntraVENous, Q6H PRN  polyethylene glycol (GLYCOLAX) packet 17 g, 17 g, Oral, Daily PRN  acetaminophen (TYLENOL) tablet 650 mg, 650 mg, Oral, Q6H PRN **OR** acetaminophen (TYLENOL) suppository 650 mg, 650 mg, Rectal, Q6H PRN  perflutren lipid microspheres (DEFINITY) injection 1.5 mL, 1.5 mL, IntraVENous, ONCE PRN    Vitals :     Vitals:    07/08/24 0420   BP: (!) 102/43   Pulse: 65   Resp: 18   Temp: 97.9 °F (36.6 °C)   SpO2: 96%          Physical Examination :     appearance: Alert, orientated  Respiratory: no distress  Cardiovascular: no visibly  raised JVD, Edema 2- 3+   Abdomen: -  soft  Other relevant findings: -      Labs :     CBC:   Recent Labs     07/06/24  0548 07/06/24  1123 07/07/24  0549 07/08/24  0441   WBC 7.8  --  8.5 8.3   HGB 8.7* 8.4* 8.8* 8.7*   HCT 26.5* 25.5* 27.0* 26.9*     --  215 167       BMP:    Recent Labs     07/06/24  0548 07/06/24  1123 07/07/24  0549 07/08/24  0441   *  --  136 133*   K 5.4* 4.9 5.8* 5.4*     --  103 99   CO2 26  --  29 28   BUN 23*  --  27* 30*   CREATININE 2.2*  --  2.6* 2.6*   GLUCOSE 148*  --  66* 224*   MG 2.20  --  2.00 1.80       Lab Results   Component Value Date/Time    COLORU Straw 07/01/2024 11:04 PM    NITRU Negative 07/01/2024 11:04 PM    GLUCOSEU >=1000 07/01/2024 11:04 PM    GLUCOSEU >=1000 mg/dL 06/07/2010 03:38 PM    KETUA Negative 07/01/2024 11:04 PM    UROBILINOGEN 0.2 07/01/2024 11:04 PM    BILIRUBINUR Negative 07/01/2024 11:04 PM        ----------------------------------------------------------  Please call with questions at      24  [facility] Answering service [phone]  Perfect serve, or cell phone 7 am - 5pm  Glen Plascencia MD   [email or web address]

## 2024-07-08 NOTE — CARE COORDINATION
Received call from Huma with [facility] who states she received referral but they are out of network with patient's insurance.  Placed calls to Shoshana with [facility] and Belinda with [facility] as these were patient other preferences.   Left message for Shoshana for call back.  Was able to reach [facility] who states they should be able to accept as soon as they verity one of patient's bipolar meds. Patient will require a precert.

## 2024-07-08 NOTE — PLAN OF CARE
Problem: Discharge Planning  Goal: Discharge to home or other facility with appropriate resources  7/7/2024 2005 by Jennifer Schwartz RN  Outcome: Progressing  7/7/2024 1041 by Dbei Villalobos, RN  Outcome: Progressing     Problem: Safety - Adult  Goal: Free from fall injury  7/7/2024 2005 by Jennifer Schwartz, RN  Outcome: Progressing  7/7/2024 1041 by Debi Villalobos, RN  Outcome: Progressing     Problem: ABCDS Injury Assessment  Goal: Absence of physical injury  Outcome: Progressing     Problem: Skin/Tissue Integrity  Goal: Absence of new skin breakdown  Description: 1.  Monitor for areas of redness and/or skin breakdown  2.  Assess vascular access sites hourly  3.  Every 4-6 hours minimum:  Change oxygen saturation probe site  4.  Every 4-6 hours:  If on nasal continuous positive airway pressure, respiratory therapy assess nares and determine need for appliance change or resting period.  7/7/2024 2005 by Jennifer Schwartz RN  Outcome: Progressing  7/7/2024 1041 by Debi Villalobos, RN  Outcome: Progressing     Problem: Pain  Goal: Verbalizes/displays adequate comfort level or baseline comfort level  Outcome: Progressing

## 2024-07-08 NOTE — PROGRESS NOTES
V2.0    [facility] [place]      Name:  Agustina Fried /Age/Sex: 1960  (64 y.o. female)   MRN & CSN:  [id number] & [id number] Encounter Date/Time: 2024 12:50 PM EDT   Location:   PCP: Viridiana Blanco, SARTHAK - NP     Attending:Herbie Randolph DO       Hospital Day: 8    Assessment and Recommendations   Agustina Fried is a 64 y.o. female with a pmh of hypertension, diabetes mellitus, GERD, hyperlipidemia, who presents with Hyperglycemia due to diabetes mellitus (HCC)     Acute on chronic diastolic heart failure  ESBL Ecoli UTI  Acute on chronic normocytic anemia  DM 2, uncontrolled with hyperglycemia  Hyperglycemia, resolved  Lactic acidosis  Hypertension  Hyperlipidemia  GERD  Chronic anemia  CKD stage IIIb    Plan:     Appreciates Nephrology input  Today's last day of antibiotic.  Completed 5-day course of meropenem.  Treated Hyper K.  SHARRON worsened yesterday and now is stable.  Probably multifactorial patient has shown evidence of urinary retention we will continue to monitor and if continues to retain will obtain renal ultrasound and CT abdomen and pelvis to assess for hydronephrosis and cause of obstruction.  Patient is likely also having some prerenal azotemia which noted nephrology plan for decreasing diuresis.  Appreciate nephrology adjusting dose of gabapentin.  Daily weight is  Strict I's and O's  I increase Lantus to 9 units and mealtime insulin to 6 units due to persistent hyperglycemia.  Anemia is most likely due to CKD 3B baseline EPO level and reticulocyte count.  Will defer to nephrology for Aranesp if hemoglobin drops however currently hemoglobin is adequate.  Continue other home medication for her comorbidities  Labs in a.m.  PT recommend rehab, awaiting placement.               Comment: Please note this report has been produced using speech recognition software and may contain errors related to that system including errors in grammar, punctuation, and spelling, as well as

## 2024-07-08 NOTE — PROGRESS NOTES
CHF Care Plan      Patient's EF (Ejection Fraction) is greater than 40%    Heart Failure Medications:  Diuretics::    and Torsemide    (One of the following REQUIRED for EF </= 40%/SYSTOLIC FAILURE but MAY be used in EF% >40%/DIASTOLIC FAILURE)        ACE:: None        ARB:: None         ARNI:: None    (Beta Blockers)  NON- Evidenced Based Beta Blocker (for EF% >40%/DIASTOLIC FAILURE): None    Evidenced Based Beta Blocker::(REQUIRED for EF% <40%/SYSTOLIC FAILURE) None  ...................................................................................................................................................    Failed to redirect to the Timeline version of the [employer] SmartLink.      Patient's weights and intake/output reviewed    Daily Weight log at bedside, patient/family participation in use of log: \"yes    Patient's current weight today shows a difference of 5 lbs less than last documented weight.      Intake/Output Summary (Last 24 hours) at 7/8/2024 0305  Last data filed at 7/7/2024 1506  Gross per 24 hour   Intake 240 ml   Output 600 ml   Net -360 ml       Education Booklet Provided: yes    Comorbidities Reviewed Yes    Patient has a past medical history of Abnormal brain MRI, Acute bilateral low back pain without sciatica, SHARRON (acute kidney injury) ([facility]), Arthritis, Bipolar disorder ([facility]), CAD (coronary artery disease), Cancer (HCC), Carotid stenosis, bilateral:<50%:per [place] 7/2016, Carpal tunnel syndrome, Cervical cancer screening, Coronary artery disease of native artery of native heart with stable angina pectoris (HCC), DDD (degenerative disc disease), lumbar, Depression, Depression/anxiety, Depression/anxiety, Diabetes mellitus (HCC), Gout, History of mammogram, History of therapeutic radiation, Hyperlipidemia, Hypertension, 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

## 2024-07-08 NOTE — PROGRESS NOTES
Physical Therapy  Facility/Department: [facility] A2 CARD TELEMETRY  Daily Treatment Note  NAME: Agustina Fried  : 1960  MRN: [id number]    Date of Service: 2024    Discharge Recommendations:  Subacute/Skilled Nursing Facility        Patient Diagnosis(es): The primary encounter diagnosis was Acute on chronic congestive heart failure, unspecified heart failure type (HCC). Diagnoses of Shortness of breath, Hyperglycemia, Stage 3 chronic kidney disease, unspecified whether stage 3a or 3b CKD (HCC), and Congestive heart failure, unspecified HF chronicity, unspecified heart failure type (HCC) were also pertinent to this visit.    Assessment   Assessment: Pt tolerated treatment session fairly well this date, limited by fatigue, able to ambulate up to 12ft X 2 with min A using RW. Pt performed bed mobility and transfers with CGA/Sanjuana. Pt continues to demonstrate decreased activity tolerance, balance and functional mobility  Pt would continue to benefit from skilled therapy during LOS to progress mobility and independence as tolerated. Continue to recommend SNF at d/c due to current deficits.  Activity Tolerance: Patient tolerated evaluation without incident;Patient tolerated treatment well;Patient limited by fatigue;Patient limited by endurance     Plan    Physical Therapy Plan  General Plan: 3-5 times per week  Current Treatment Recommendations: Strengthening;Transfer training;Gait training;Home exercise program;Therapeutic activities;Endurance training;Safety education & training;Functional mobility training;Balance training     Restrictions  Restrictions/Precautions  Restrictions/Precautions: Up as Tolerated, General Precautions, Fall Risk     Subjective    Subjective  Subjective: sleeping, hard to arouse  Pain: back pain, not rated  Orientation  Overall Orientation Status:  (diffiuclt to assess, pt speech difficult to understand)  Orientation Level: Oriented X4  Cognition  Overall Cognitive Status:  goals  Short Term Goal 1: pt to perform bed mobility modified indep  Short Term Goal 2: pt to perform transfers SBA  Short Term Goal 3: pt to amb with RW at least 50 ft with CG  Short Term Goal 4: pt to participate in therapeutic ex 12-15 reps by 7/10  Short Term Goal 5: Pt to meet at least 3/5 HF program goals  Patient Goals   Patient Goals : \"I don't know what's wrong with me, I hope I get stronger\"    Education  Patient Education  Education Given To: Patient  Education Provided: Role of Therapy;Plan of Care;Transfer Training;Fall Prevention Strategies;Energy Conservation  Education Provided Comments: general safety, prevention of complications of bedrest, initiated CHF education with ther ex and discussion of importance of daily weighing  Education Method: Verbal  Barriers to Learning: None  Education Outcome: Verbalized understanding;Continued education needed    AM-PAC - Mobility    AM-PAC Basic Mobility - Inpatient   How much help is needed turning from your back to your side while in a flat bed without using bedrails?: None  How much help is needed moving from lying on your back to sitting on the side of a flat bed without using bedrails?: A Little  How much help is needed moving to and from a bed to a chair?: A Little  How much help is needed standing up from a chair using your arms?: A Little  How much help is needed walking in hospital room?: A Little  How much help is needed climbing 3-5 steps with a railing?: A Lot  AM-PAC Inpatient Mobility Raw Score : 18  AM-PAC Inpatient T-Scale Score : 43.63  Mobility Inpatient CMS 0-100% Score: 46.58  Mobility Inpatient CMS G-Code Modifier : CK         Therapy Time   Individual Concurrent Group Co-treatment   Time In 0810         Time Out 0850         Minutes 40                 Nasrin Jones, PTA#[id number]

## 2024-07-08 NOTE — PLAN OF CARE
CHF Care Plan      Patient's EF (Ejection Fraction) is greater than 40%    Heart Failure Medications:  Diuretics:: Torsemide  (One of the following REQUIRED for EF </= 40%/SYSTOLIC FAILURE but MAY be used in EF% >40%/DIASTOLIC FAILURE)        ACE:: None        ARB:: None         ARNI:: None    (Beta Blockers)  NON- Evidenced Based Beta Blocker (for EF% >40%/DIASTOLIC FAILURE): None    Evidenced Based Beta Blocker::(REQUIRED for EF% <40%/SYSTOLIC FAILURE) None  ...................................................................................................................................................    Failed to redirect to the Timeline version of the [employer] SmartLink.      Patient's weights and intake/output reviewed    Daily Weight log at bedside, patient/family participation in use of log: \"yes    Patient's current weight today shows a difference of 5 lbs less than last documented weight.      Intake/Output Summary (Last 24 hours) at 7/8/2024 0942  Last data filed at 7/8/2024 0921  Gross per 24 hour   Intake 800 ml   Output 250 ml   Net 550 ml       Education Booklet Provided: yes    Comorbidities Reviewed Yes    Patient has a past medical history of Abnormal brain MRI, Acute bilateral low back pain without sciatica, SHARRON (acute kidney injury) ([facility]), Arthritis, Bipolar disorder ([facility]), CAD (coronary artery disease), Cancer (HCC), Carotid stenosis, bilateral:<50%:per US 7/2016, Carpal tunnel syndrome, Cervical cancer screening, Coronary artery disease of native artery of native heart with stable angina pectoris (HCC), DDD (degenerative disc disease), lumbar, Depression, Depression/anxiety, Depression/anxiety, Diabetes mellitus (HCC), Gout, History of mammogram, History of therapeutic radiation, Hyperlipidemia, Hypertension, 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

## 2024-07-09 LAB
ANION GAP SERPL CALCULATED.3IONS-SCNC: 4 MMOL/L (ref 3–16)
BUN SERPL-MCNC: 29 MG/DL (ref 7–20)
CALCIUM SERPL-MCNC: 7.6 MG/DL (ref 8.3–10.6)
CHLORIDE SERPL-SCNC: 97 MMOL/L (ref 99–110)
CO2 SERPL-SCNC: 34 MMOL/L (ref 21–32)
CREAT SERPL-MCNC: 2.8 MG/DL (ref 0.6–1.2)
GFR SERPLBLD CREATININE-BSD FMLA CKD-EPI: 18 ML/MIN/{1.73_M2}
GLUCOSE BLD-MCNC: 110 MG/DL (ref 70–99)
GLUCOSE BLD-MCNC: 143 MG/DL (ref 70–99)
GLUCOSE BLD-MCNC: 173 MG/DL (ref 70–99)
GLUCOSE BLD-MCNC: 233 MG/DL (ref 70–99)
GLUCOSE SERPL-MCNC: 131 MG/DL (ref 70–99)
IRON SATN MFR SERPL: 38 % (ref 15–50)
IRON SERPL-MCNC: 59 UG/DL (ref 37–145)
MAGNESIUM SERPL-MCNC: 1.6 MG/DL (ref 1.8–2.4)
PERFORMED ON: ABNORMAL
POTASSIUM SERPL-SCNC: 5.2 MMOL/L (ref 3.5–5.1)
SODIUM SERPL-SCNC: 135 MMOL/L (ref 136–145)
TIBC SERPL-MCNC: 154 UG/DL (ref 260–445)

## 2024-07-09 PROCEDURE — 2580000003 HC RX 258: Performed by: STUDENT IN AN ORGANIZED HEALTH CARE EDUCATION/TRAINING PROGRAM

## 2024-07-09 PROCEDURE — 2060000000 HC ICU INTERMEDIATE R&B

## 2024-07-09 PROCEDURE — 83735 ASSAY OF MAGNESIUM: CPT

## 2024-07-09 PROCEDURE — 6370000000 HC RX 637 (ALT 250 FOR IP): Performed by: STUDENT IN AN ORGANIZED HEALTH CARE EDUCATION/TRAINING PROGRAM

## 2024-07-09 PROCEDURE — 36415 COLL VENOUS BLD VENIPUNCTURE: CPT

## 2024-07-09 PROCEDURE — 6360000002 HC RX W HCPCS: Performed by: STUDENT IN AN ORGANIZED HEALTH CARE EDUCATION/TRAINING PROGRAM

## 2024-07-09 PROCEDURE — 6370000000 HC RX 637 (ALT 250 FOR IP): Performed by: INTERNAL MEDICINE

## 2024-07-09 PROCEDURE — 6370000000 HC RX 637 (ALT 250 FOR IP): Performed by: NURSE PRACTITIONER

## 2024-07-09 PROCEDURE — 80048 BASIC METABOLIC PNL TOTAL CA: CPT

## 2024-07-09 PROCEDURE — 97535 SELF CARE MNGMENT TRAINING: CPT

## 2024-07-09 RX ADMIN — PALIPERIDONE 9 MG: 3 TABLET, EXTENDED RELEASE ORAL at 09:30

## 2024-07-09 RX ADMIN — ATORVASTATIN CALCIUM 40 MG: 40 TABLET, FILM COATED ORAL at 20:43

## 2024-07-09 RX ADMIN — SODIUM ZIRCONIUM CYCLOSILICATE 10 G: 10 POWDER, FOR SUSPENSION ORAL at 09:07

## 2024-07-09 RX ADMIN — GABAPENTIN 200 MG: 100 CAPSULE ORAL at 09:07

## 2024-07-09 RX ADMIN — INSULIN LISPRO 6 UNITS: 100 INJECTION, SOLUTION INTRAVENOUS; SUBCUTANEOUS at 09:07

## 2024-07-09 RX ADMIN — OXYCODONE AND ACETAMINOPHEN 1 TABLET: 7.5; 325 TABLET ORAL at 11:36

## 2024-07-09 RX ADMIN — INSULIN LISPRO 6 UNITS: 100 INJECTION, SOLUTION INTRAVENOUS; SUBCUTANEOUS at 16:28

## 2024-07-09 RX ADMIN — ENOXAPARIN SODIUM 30 MG: 100 INJECTION SUBCUTANEOUS at 09:06

## 2024-07-09 RX ADMIN — INSULIN LISPRO 6 UNITS: 100 INJECTION, SOLUTION INTRAVENOUS; SUBCUTANEOUS at 11:29

## 2024-07-09 RX ADMIN — SODIUM CHLORIDE, PRESERVATIVE FREE 10 ML: 5 INJECTION INTRAVENOUS at 20:43

## 2024-07-09 RX ADMIN — TRAZODONE HYDROCHLORIDE 100 MG: 50 TABLET ORAL at 20:43

## 2024-07-09 RX ADMIN — TAMSULOSIN HYDROCHLORIDE 0.4 MG: 0.4 CAPSULE ORAL at 09:07

## 2024-07-09 RX ADMIN — GABAPENTIN 200 MG: 100 CAPSULE ORAL at 20:43

## 2024-07-09 RX ADMIN — INSULIN LISPRO 4 UNITS: 100 INJECTION, SOLUTION INTRAVENOUS; SUBCUTANEOUS at 11:29

## 2024-07-09 RX ADMIN — INSULIN GLARGINE 9 UNITS: 100 INJECTION, SOLUTION SUBCUTANEOUS at 09:07

## 2024-07-09 ASSESSMENT — PAIN SCALES - WONG BAKER
WONGBAKER_NUMERICALRESPONSE: NO HURT

## 2024-07-09 ASSESSMENT — PAIN SCALES - GENERAL
PAINLEVEL_OUTOF10: 7
PAINLEVEL_OUTOF10: 3

## 2024-07-09 NOTE — CONSULTS
Nutrition Assessment     Type and Reason for Visit: Initial, RD Nutrition Re-Screen/LOS, Consult    Nutrition Recommendations/Plan:   Continue carb control, FAUZIA, low potassium diet with 2000 mL FR   Monitor nutrition adequacy, pertinent labs, bowel habits, wt changes, and clinical progress     Malnutrition Assessment:  Malnutrition Status: No malnutrition    Nutrition Assessment:  Pt seen for LOS and consulted for CHF nutrition education. Pt admitted with hyperglycemia. Hx of DM, colon CA, CAD, and CHF. Pt nutritionally stable AEB good appetite and PO intakes of % this admission. Weight changes difficult to assess d/t variable weight hx and hx of CHF. Encouraged continued good PO intakes. Consulted for CHF diet education, pt declined diet education needs on CHF and DM diet. Will monitor.    Nutrition Related Findings:   Active BS. BM on 7/7. +1 pitting BLE edema. K 5.2. BG  past 24 hrs. Wound Type: Pressure Injury, Stage I    Current Nutrition Therapies:    ADULT DIET; Regular; 4 carb choices (60 gm/meal); No Added Salt (3-4 gm); Low Potassium (Less than 3000 mg/day); 2000 ml    Anthropometric Measures:  Height: 157.5 cm (5' 2\")  Current Body Wt: 60.8 kg (134 lb)   BMI: 24.5    Nutrition Diagnosis:   No nutrition diagnosis at this time     Nutrition Interventions:   Food and/or Nutrient Delivery: Continue Current Diet  Nutrition Education/Counseling: Education declined  Coordination of Nutrition Care: Continue to monitor while inpatient       Goals:     Goals: PO intake 50% or greater, prior to discharge       Discharge Planning:    Continue current diet     Mary Jane Aguilar, MS, RD, LD  Contact: [phone]

## 2024-07-09 NOTE — PROGRESS NOTES
V2.0    [facility] [place]      Name:  Agustina Fried /Age/Sex: 1960  (64 y.o. female)   MRN & CSN:  [id number] & [id number] Encounter Date/Time: 2024 12:50 PM EDT   Location:   PCP: Viridiana Blanco, APRN - NP     Attending:Charlie Gunn MD       Hospital Day: 9    Assessment and Recommendations   Agustina Fried is a 64 y.o. female with a pmh of hypertension, diabetes mellitus, GERD, hyperlipidemia, who presents with Hyperglycemia due to diabetes mellitus (HCC)     Acute on chronic diastolic heart failure  ESBL Ecoli UTI  Acute on chronic normocytic anemia  DM 2, uncontrolled with hyperglycemia  Hyperglycemia, resolved  Lactic acidosis  Hypertension  Hyperlipidemia  GERD  Chronic anemia  CKD stage IIIb    Plan:     Appreciates Nephrology input creatinine is still going up it is 2.8 from 2.6 today pending placement will need to work on the creatinine before the patient can go out nephrology on board medically stable patient is making some amount of urine  Today's last day of antibiotic.  Completed 5-day course of meropenem.  Treated Hyper K.  SHARRON worsened yesterday and now is stable.  Probably multifactorial patient has shown evidence of urinary retention we will continue to monitor and if continues to retain will obtain renal ultrasound and CT abdomen and pelvis to assess for hydronephrosis and cause of obstruction.  Patient is likely also having some prerenal azotemia which noted nephrology plan for decreasing diuresis.  Appreciate nephrology adjusting dose of gabapentin.  Daily weight is  Strict I's and O's  I increase Lantus to 9 units and mealtime insulin to 6 units due to persistent hyperglycemia.  Anemia is most likely due to CKD 3B baseline EPO level and reticulocyte count.  Will defer to nephrology for Aranesp if hemoglobin drops however currently hemoglobin is adequate.  Continue other home medication for her comorbidities  Labs in a.m.  PT recommend rehab, awaiting placement.    Intake 1000 ml   Output 375 ml   Net 625 ml        Vitals:   Vitals:    07/08/24 2349 07/09/24 0453 07/09/24 0455 07/09/24 0845   BP: (!) 110/44 (!) 116/48  (!) 132/45   Pulse: 62 68  67   Resp: 16 16  16   Temp: 98.1 °F (36.7 °C) 98.2 °F (36.8 °C)  98.2 °F (36.8 °C)   TempSrc: Oral Oral  Oral   SpO2: 98% 97%  98%   Weight:   60.8 kg (134 lb 0.6 oz)    Height:             Physical Exam:      General: NAD  Eyes: EOMI  ENT: neck supple  Cardiovascular: Regular rate.  Respiratory: Clear to auscultation  Gastrointestinal: Soft, non tender  Genitourinary: no suprapubic tenderness  Musculoskeletal: Trace pedal edema  Skin: warm, dry  Neuro: Alert.  Psych: Mood appropriate.         Medications:   Medications:    gabapentin  200 mg Oral BID    [Held by provider] torsemide  20 mg Oral Daily    insulin glargine  9 Units SubCUTAneous Daily    insulin lispro  6 Units SubCUTAneous TID WC    tamsulosin  0.4 mg Oral Daily    [Held by provider] amLODIPine  5 mg Oral Daily    insulin lispro  0-16 Units SubCUTAneous TID WC    lidocaine  1 patch TransDERmal Daily    paliperidone  9 mg Oral QAM    atorvastatin  40 mg Oral Nightly    traZODone  100 mg Oral Nightly    sodium chloride flush  5-40 mL IntraVENous 2 times per day    enoxaparin  30 mg SubCUTAneous Daily      Infusions:    dextrose      sodium chloride       PRN Meds: glucose, 4 tablet, PRN  dextrose bolus, 125 mL, PRN   Or  dextrose bolus, 250 mL, PRN  glucagon (rDNA), 1 mg, PRN  dextrose, , Continuous PRN  sodium chloride, , PRN  oxyCODONE-acetaminophen, 1 tablet, Q4H PRN  sodium chloride flush, 5-40 mL, PRN  ondansetron, 4 mg, Q8H PRN   Or  ondansetron, 4 mg, Q6H PRN  polyethylene glycol, 17 g, Daily PRN  acetaminophen, 650 mg, Q6H PRN   Or  acetaminophen, 650 mg, Q6H PRN  perflutren lipid microspheres, 1.5 mL, ONCE PRN        Labs and Imaging   XR CHEST PORTABLE    Result Date: 7/1/2024  EXAMINATION: ONE XRAY VIEW OF THE CHEST 7/1/2024 9:25 pm COMPARISON: 04/17/2024

## 2024-07-09 NOTE — PROGRESS NOTES
Patient's morning assessment has been completed.  Patient is alert and oriented and vital signs are stable.  Patient voiced no complaints or concerns at this time.  Medications administered as ordered.  Patient's bed is in the lowest position and call light is within reach.  Will continue to monitor      Patient's EF (Ejection Fraction) is greater than 40%    Heart Failure Medications:  Diuretics:: Torsemide    (One of the following REQUIRED for EF </= 40%/SYSTOLIC FAILURE but MAY be used in EF% >40%/DIASTOLIC FAILURE)        ACE:: None        ARB:: None         ARNI:: None    (Beta Blockers)  NON- Evidenced Based Beta Blocker (for EF% >40%/DIASTOLIC FAILURE): None    Evidenced Based Beta Blocker::(REQUIRED for EF% <40%/SYSTOLIC FAILURE) None  ...................................................................................................................................................  Patient's weights and intake/output reviewed: Yes    Patient's Last Weight: 134 lbs obtained by bed scale. Difference of 6 lbs less than last documented weight.      Intake/Output Summary (Last 24 hours) at 7/9/2024 1032  Last data filed at 7/9/2024 0456  Gross per 24 hour   Intake 1000 ml   Output 375 ml   Net 625 ml       Comorbidities Reviewed Yes    Patient has a past medical history of Abnormal brain MRI, Acute bilateral low back pain without sciatica, SHARRON (acute kidney injury) ([facility]), Arthritis, Bipolar disorder ([facility]), CAD (coronary artery disease), Cancer ([facility]), Carotid stenosis, bilateral:<50%:per US 7/2016, Carpal tunnel syndrome, Cervical cancer screening, Coronary artery disease of native artery of native heart with stable angina pectoris ([facility]), DDD (degenerative disc disease), lumbar, Depression, Depression/anxiety, Depression/anxiety, Diabetes mellitus (HCC), Gout, History of mammogram, History of therapeutic radiation, Hyperlipidemia, Hypertension, 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

## 2024-07-09 NOTE — PROGRESS NOTES
HEART FAILURE CARE PLAN:    Comorbidities Reviewed: Yes   Patient has a past medical history of Abnormal brain MRI, Acute bilateral low back pain without sciatica, SHARRON (acute kidney injury) ([facility]), Arthritis, Bipolar disorder ([facility]), CAD (coronary artery disease), Cancer ([facility]), Carotid stenosis, bilateral:<50%:per US 7/2016, Carpal tunnel syndrome, Cervical cancer screening, Coronary artery disease of native artery of native heart with stable angina pectoris ([facility]), DDD (degenerative disc disease), lumbar, Depression, Depression/anxiety, Depression/anxiety, Diabetes mellitus ([facility]), Gout, History of mammogram, History of therapeutic radiation, Hyperlipidemia, Hypertension, 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     Weights Reviewed: Yes   Admission weight: 63.5 kg (140 lb)   Wt Readings from Last 3 Encounters:   07/08/24 59.9 kg (132 lb 1.6 oz)   04/25/24 62.7 kg (138 lb 4.8 oz)   02/28/24 57.6 kg (127 lb)     Intake & Output Reviewed: Yes     Intake/Output Summary (Last 24 hours) at 7/9/2024 0105  Last data filed at 7/8/2024 2239  Gross per 24 hour   Intake 1440 ml   Output 625 ml   Net 815 ml       ECHOCARDIOGRAM Reviewed: Yes   Patient's Ejection Fraction (EF) is greater than 40%     Medications Reviewed: Yes   SCHEDULED HOSPITAL MEDICATIONS:   gabapentin  200 mg Oral BID    torsemide  20 mg Oral Daily    insulin glargine  9 Units SubCUTAneous Daily    insulin lispro  6 Units SubCUTAneous TID WC    tamsulosin  0.4 mg Oral Daily    [Held by provider] amLODIPine  5 mg Oral Daily    insulin lispro  0-16 Units SubCUTAneous TID WC    lidocaine  1 patch TransDERmal Daily  Pen Needle 32G X 4 MM MISC 1 each by Does not apply route daily 11/25/23   Deisi Vigil MD   prazosin (MINIPRESS) 2 MG capsule Take 2 capsules by mouth 2 times daily 11/7/23 2/15/24  Yonatan Coates MD   AUSTEDO 9 MG tablet Take 1 tablet by mouth daily 5/1/23   Imani Brady MD   paliperidone (INVEGA) 9 MG extended release tablet Take 1 tablet by mouth every morning 3/15/21   Imani Brady MD   calcium carbonate-vitamin D (CALTRATE) 600-400 MG-UNIT TABS per tab Take 1 tablet by mouth daily 12/30/19   Imani Brady MD      Diet Reviewed: Yes   ADULT DIET; Regular; 4 carb choices (60 gm/meal); No Added Salt (3-4 gm); Low Potassium (Less than 3000 mg/day); 2000 ml    Goal of Care Reviewed: Yes   Patient and/or Family's stated Goal of Care this Admission: Reduce shortness of breath, increase activity tolerance, better understand heart failure and disease management, be more comfortable, and reduce lower extremity edema prior to discharge.     Electronically signed by Cinthya Gresham RN on 7/9/2024 at 1:05 AM

## 2024-07-09 NOTE — PROGRESS NOTES
Ph: [phone], Fax: [phone]           [facility]               Reason for admission:                 Hyperglycemia, fluid overload       Interval History and plan:     Edema not much less than before  Creatinine continues to go up  Potassium is getting better with Lokelma  But is still on the high side continue with Lokelma for today  CO2 is 34 due to diuretic  Hold off on torsemide                Assessment :     CKD  1.9 at the time of consult  1.9 on 4/25/2024 at the time of last hospital discharge  Creatinine peaked to 3.1 last hospitalization    Hypertension   BP: (116)/(48)  Pulse:  [68]   BP goal inpatient 130-140 systolic inpatient        Hyponatremia  Sodium 129 on presentation 130  Going up appropriately  Has history of recurrent hyponatremia                  [facility] would like to thank Charlie Gunn MD   for opportunity to serve this patient      Please call with questions at-   24 Hrs Answering service [phone] or  7 am- 5 pm via Perfect serve or cell phone  Dr.Sudhir Temo MD       HPI :     Agustina Fried is a 64 y.o. female presented to   the hospital on 7/1/2024 with severe hyperglycemia, also complaining of the legs.  She got a dose of Lasix at the time of hospitalization.  She was actually sent to the hospital because of very high glucose.  She is known to us for CKD .  Consulted for the same        PMH/PSH/SH/Family History:     Past Medical History:   Diagnosis Date    Abnormal brain MRI 7/20/2017    Partially empty sella and minimal chronic small vessel ischemic disease    Acute bilateral low back pain without sciatica 11/2/2016    SHARRON (acute kidney injury) ([facility]) 7/5/2017    Arthritis     back    Bipolar disorder ([facility]) 10/18/2008    CAD (coronary artery disease)     stent placed 6/8/20    Cancer ([facility]) 2015    bilateral breast:s/p lumpectomy/radiation:under care care of breast specialist:Dr. Boone     Carotid stenosis, bilateral:<50%:per US 7/2016  Daily  insulin lispro (HUMALOG,ADMELOG) injection vial 0-16 Units, 0-16 Units, SubCUTAneous, TID WC  lidocaine 4 % external patch 1 patch, 1 patch, TransDERmal, Daily  oxyCODONE-acetaminophen (PERCOCET) 7.5-325 MG per tablet 1 tablet, 1 tablet, Oral, Q4H PRN  paliperidone (INVEGA) extended release tablet 9 mg, 9 mg, Oral, QAM  atorvastatin (LIPITOR) tablet 40 mg, 40 mg, Oral, Nightly  traZODone (DESYREL) tablet 100 mg, 100 mg, Oral, Nightly  sodium chloride flush 0.9 % injection 5-40 mL, 5-40 mL, IntraVENous, 2 times per day  sodium chloride flush 0.9 % injection 5-40 mL, 5-40 mL, IntraVENous, PRN  enoxaparin Sodium (LOVENOX) injection 30 mg, 30 mg, SubCUTAneous, Daily  ondansetron (ZOFRAN-ODT) disintegrating tablet 4 mg, 4 mg, Oral, Q8H PRN **OR** ondansetron (ZOFRAN) injection 4 mg, 4 mg, IntraVENous, Q6H PRN  polyethylene glycol (GLYCOLAX) packet 17 g, 17 g, Oral, Daily PRN  acetaminophen (TYLENOL) tablet 650 mg, 650 mg, Oral, Q6H PRN **OR** acetaminophen (TYLENOL) suppository 650 mg, 650 mg, Rectal, Q6H PRN  perflutren lipid microspheres (DEFINITY) injection 1.5 mL, 1.5 mL, IntraVENous, ONCE PRN    Vitals :     Vitals:    07/09/24 0453   BP: (!) 116/48   Pulse: 68   Resp: 16   Temp: 98.2 °F (36.8 °C)   SpO2: 97%          Physical Examination :     appearance: Alert, orientated  Respiratory: no distress  Cardiovascular: no visibly  raised JVD, Edema 2- 3+   Abdomen: -  soft  Other relevant findings: -      Labs :     CBC:   Recent Labs     07/06/24  1123 07/07/24  0549 07/08/24  0441   WBC  --  8.5 8.3   HGB 8.4* 8.8* 8.7*   HCT 25.5* 27.0* 26.9*   PLT  --  215 167       BMP:    Recent Labs     07/07/24  0549 07/08/24  0441 07/09/24  0513    133* 135*   K 5.8* 5.4* 5.2*    99 97*   CO2 29 28 34*   BUN 27* 30* 29*   CREATININE 2.6* 2.6* 2.8*   GLUCOSE 66* 224* 131*   MG 2.00 1.80 1.60*       Lab Results   Component Value Date/Time    COLORU Straw 07/01/2024 11:04 PM    NITRU Negative 07/01/2024 11:04

## 2024-07-09 NOTE — FLOWSHEET NOTE
07/02/24 1956   Assessment   Charting Type Shift assessment   Psychosocial   Psychosocial (WDL) X   Neurological   Neuro (WDL) X   Level of Consciousness 0   Orientation Level Oriented X4   [place]a Coma Scale   Eye Opening 4   Best Verbal Response 5   Best Motor Response 6   Alberto Coma Scale Score 15   HEENT (Head, Ears, Eyes, Nose, & Throat)   HEENT (WDL) X   Teeth Missing teeth   Respiratory   Respiratory (WDL) WDL   Cardiac   Cardiac (WDL) WDL   Gastrointestinal   Abdominal (WDL) WDL   Genitourinary   Genitourinary (WDL) WDL   Peripheral Vascular   Peripheral Vascular (WDL) X   Edema Right lower extremity;Left lower extremity   RLE Edema +3;Pitting   LLE Edema +3;Pitting   Skin Integumentary    Skin Integumentary (WDL) WDL   Musculoskeletal   Musculoskeletal (WDL) X  (unsteady and generalized weakness)       
   07/05/24 0533   Treatment Team Notification   Reason for Communication Critical results   Type of Critical Result Laboratory   Critical Lab Information Hgb 6.9   Person Result Received From Lab   Critical Lab Result Type Hemoglobin and hematocrit   Name of Team Member Notified Dr Whitney   Treatment Team Role Attending Provider   Method of Communication Secure Message   Response Waiting for response   Notification Time 0552       
   07/07/24 2004   Assessment   Charting Type Shift assessment   Psychosocial   Psychosocial (WDL) X   Patient Behaviors Lethargic   Neurological   Neuro (WDL) X   Level of Consciousness 0   Orientation Level Oriented X4   Cognition Follows commands   Speech Clear;Delayed responses   Tremors   Tremor Location Hands;Legs   Tremor Severity Moderate   Tremor Duration occasional   Alberto Coma Scale   Eye Opening 4   Best Verbal Response 5   Best Motor Response 6   Alberto Coma Scale Score 15   HEENT (Head, Ears, Eyes, Nose, & Throat)   HEENT (WDL) X   Teeth Edentulous   Right Eye Impaired vision   Left Eye Impaired vision   Respiratory   Respiratory (WDL) WDL   Respiratory Interventions H.O.B. elevated   Respiratory Pattern Regular   Respiratory Depth Normal   Respiratory Quality/Effort Unlabored   Chest Assessment Chest expansion symmetrical;Trachea midline   L Breath Sounds Diminished   R Breath Sounds Diminished   Level of Activity/Mobility 1   Cardiac   Cardiac (WDL) WDL   Cardiac Regularity Regular   Heart Sounds S1, S2   Cardiac Rhythm Sinus rhythm   Cardiac Monitor   Cardiac/Telemetry Monitor On Portable telemetry pack applied   Alarm Audible Yes   Alarms Set Yes   Gastrointestinal   Abdominal (WDL) WDL   Last BM (including prior to admit) 07/07/24   Abdomen Inspection Rounded;Soft   RUQ Bowel Sounds Active   LUQ Bowel Sounds Active   RLQ Bowel Sounds Active   LLQ Bowel Sounds Active   GI Symptoms Diarrhea   Peripheral Vascular   Peripheral Vascular (WDL) WDL   Edema Right lower extremity;Left lower extremity   Edema Generalized +1   RLE Edema +2;Pitting   LLE Edema +2;Pitting   Anti-Embolism   Anti-Embolism Intervention Medication  (Lovenox)   Skin Integumentary    Skin Integumentary (WDL) X   Skin Condition/Temp Dry;Warm   Skin Integrity Wound (see LDA)   Location coccyx   Skin Fold Management No   Nails WDL   Multiple Skin Integrity Sites Yes   Wound Prevention and Protection Methods   Location of Wound 
Extremity Weakness   LL Extremity Weakness   Wound 01/18/24 Coccyx Mid   Date First Assessed/Time First Assessed: 01/18/24 1930   Primary Wound Type: Pressure Injury  Location: Coccyx  Wound Location Orientation: Mid   Wound Etiology Pressure Stage 1   Dressing Status Clean;Dry;Intact   Wound Cleansed Not Cleansed   Dressing/Treatment Foam  (refused)   Wound Assessment Non-blanchable erythema   Drainage Amount None (dry)

## 2024-07-09 NOTE — CONSULTS
[facility]  HEART FAILURE PROGRAM      Agustina Fried 1960    History:  Past Medical History:   Diagnosis Date    Abnormal brain MRI 7/20/2017    Partially empty sella and minimal chronic small vessel ischemic disease    Acute bilateral low back pain without sciatica 11/2/2016    SHARRON (acute kidney injury) ([facility]) 7/5/2017    Arthritis     back    Bipolar disorder ([facility]) 10/18/2008    CAD (coronary artery disease)     stent placed 6/8/20    Cancer ([facility]) 2015    bilateral breast:s/p lumpectomy/radiation:under care care of breast specialist:Dr. Boone     Carotid stenosis, bilateral:<50%:per US 7/2016 7/15/2016    Carpal tunnel syndrome 10/18/2008    Cervical cancer screening 2014    Nml per pt'.    Coronary artery disease of native artery of native heart with stable angina pectoris ([facility]) 6/9/2020    DDD (degenerative disc disease), lumbar 7/18/2018    Depression     under care of pschiatrist:Dr. Rojas    Depression/anxiety 7/5/2017    Depression/anxiety     Diabetes mellitus ([facility])     Gout     History of mammogram 10/28/2016;8/14/17    Negative    History of therapeutic radiation     Hyperlipidemia     Hypertension     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 stage 3 chronic kidney disease ([facility]) 9/17/2017    Microalbuminuria 7/1/2016    Neuropathy in diabetes ([facility])     Non morbid obesity 7/1/2016    Pancreatitis 5/12/16    [facility] hospitalization 5/12/16-5/16/16:under care of GI:chronic pancreatitis    S/P endoscopy 6/14/2016    [facility]:per pt' & her family member was nml.    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 myelopathy or radiculopathy 3/10/2017    Transient cerebral ischemia 07/15/2016    TIA:7/10/16    Unspecified cerebral artery occlusion with cerebral infarction     TIA       ECHO:  1/19/24   EF 50-55%  HgA1C: 4/12/24  12.5  Iron Saturation: 4/17/24  59  Ferritin: 4/17/24  344.3    ACE/ARB/ARNI:   BB:   Spironolactone:  Amlodipine 5mg

## 2024-07-09 NOTE — CARE COORDINATION
Spoke to Belinda with [facility] who states they are able to accept and she is initiating precert at this time.  Updated the patient on the above.  She verbalized understanding.

## 2024-07-09 NOTE — PROGRESS NOTES
Physical Therapy  Facility/Department: [facility] A2 CARD TELEMETRY  Daily Treatment Note  NAME: Agustina Fried  : 1960  MRN: [id number]    Date of Service: 2024    Discharge Recommendations:  Subacute/Skilled Nursing Facility   PT Equipment Recommendations  Equipment Needed: No    Therapy discharge recommendations take into account each patient's current medical complexities and are subject to input/oversight from the patient's healthcare team.   Barriers to Home Discharge:   [x] Steps to access home entry or bed/bath:   [x] Unable to transfer, ambulate, or propel wheelchair household distances without assist   [x] Limited available assist at home upon discharge    [] Patient or family requests d/c to post-acute facility    [] Poor cognition/safety awareness for d/c to home alone    []Unable to maintain ordered weight bearing status    [] Patient with salient signs of long-standing immobility   [x] Patient is at risk for falls   [] Other:    If pt is unable to be seen after this session, please let this note serve as discharge summary.  Please see case management note for discharge disposition.  Thank you.    Patient Diagnosis(es): The primary encounter diagnosis was Acute on chronic congestive heart failure, unspecified heart failure type (HCC). Diagnoses of Shortness of breath, Hyperglycemia, Stage 3 chronic kidney disease, unspecified whether stage 3a or 3b CKD (HCC), and Congestive heart failure, unspecified HF chronicity, unspecified heart failure type (HCC) were also pertinent to this visit.    Assessment   Assessment: Pt grossly CGA for transfers and ambulation with RW. Pt limited by back pain. Will continue to progress mobility as pt tolerates.  Activity Tolerance: Patient tolerated evaluation without incident;Patient tolerated treatment well;Patient limited by fatigue;Patient limited by endurance  Equipment Needed: No   Pt seen for cotreatment this date due to patient safety and patient  endurance    Plan    Physical Therapy Plan  General Plan: 3-5 times per week  Current Treatment Recommendations: Strengthening;Transfer training;Gait training;Home exercise program;Therapeutic activities;Endurance training;Safety education & training;Functional mobility training;Balance training     Restrictions  Restrictions/Precautions  Restrictions/Precautions: Up as Tolerated, General Precautions, Fall Risk     Subjective    Subjective  Subjective: Pt agreeable to therapy.  Pain: 7/10 pain in back     Objective   Vitals  /44, HR 68     Bed Mobility Training  Bed Mobility Training: Yes  Supine to Sit: Stand-by assistance  Balance  Sitting: Intact  Sitting - Static: Good (unsupported)  Sitting - Dynamic: Good (unsupported)  Standing: Impaired  Standing - Static: Fair  Standing - Dynamic: Fair  Transfer Training  Transfer Training: Yes  Sit to Stand: Contact-guard assistance  Stand to Sit: Contact-guard assistance  Gait  Gait Training: Yes  Overall Level of Assistance: Contact-guard assistance  Distance (ft): 10 Feet (x2)  Assistive Device: Gait belt;Walker, rolling  Interventions: Safety awareness training;Verbal cues  Base of Support: Narrowed  Speed/Olga: Slow  Gait Abnormalities: Decreased step clearance;Trunk sway increased     PT Exercises  Exercise Treatment: Seated ther ex: ankle pumps, LAQ and hip flexion  Other Specialty Interventions  Other Treatments/Modalities: Pt able to complete toileting with CGA  Safety Devices  Type of Devices: All fall risk precautions in place;Call light within reach;Patient at risk for falls;Nurse notified;Gait belt;Chair alarm in place;Left in chair       Goals  Short Term Goals  Time Frame for Short Term Goals: 1 week (7/06) unless otherwise specified  Short Term Goal 1: pt to perform bed mobility modified indep  Short Term Goal 2: pt to perform transfers SBA  Short Term Goal 3: pt to amb with RW at least 50 ft with CG  Short Term Goal 4: pt to participate in

## 2024-07-09 NOTE — PROGRESS NOTES
Occupational Therapy  Facility/Department: [facility] A2 CARD TELEMETRY  Daily Treatment Note  NAME: Agustina Fried  : 1960  MRN: [id number]    Date of Service: 2024    Discharge Recommendations:  Subacute/Skilled Nursing Facility  OT Equipment Recommendations  Equipment Needed: No  Therapy discharge recommendations are subject to collaboration from the patient’s interdisciplinary healthcare team, including MD and case management recommendations.    Barriers to Home Discharge:   [x] Steps to access home entry or bed/bath:   [x] Unable to transfer, ambulate, or propel wheelchair household distances without assist   [x] Limited available assist at home upon discharge    [] Patient or family requests d/c to post-acute facility    [] Poor cognition/safety awareness for d/c to home alone    [] Unable to maintain ordered weight bearing status    [] Patient with salient signs of long-standing immobility   [] Decreased independence with ADLs   [x] Increased risk for falls   [] Other:    If pt is unable to be seen after this session, please let this note serve as discharge summary.  Please see case management note for discharge disposition.  Thank you.      Patient Diagnosis(es): The primary encounter diagnosis was Acute on chronic congestive heart failure, unspecified heart failure type (HCC). Diagnoses of Shortness of breath, Hyperglycemia, Stage 3 chronic kidney disease, unspecified whether stage 3a or 3b CKD (HCC), and Congestive heart failure, unspecified HF chronicity, unspecified heart failure type (HCC) were also pertinent to this visit.     Assessment    Assessment: Pt seen for follow up OT session this date. Improved functional transfers this date, requiring CGA with RW. Pt completed toileting and standing grooming with CGA. Pt would continue to benefit from acute OT at this time to improve functional mobility and ADL independence. D/c recs for SNF when medically ready.     Activity Tolerance: Patient  Inpatient CMS 0-100% Score: 63.03  ADL Inpatient CMS G-Code Modifier : CL    Therapy Time   Individual Concurrent Group Co-treatment   Time In 0845         Time Out 0910         Minutes 25         Timed Code Treatment Minutes: 25 Minutes       Margo Arambula, OT

## 2024-07-10 LAB
ANION GAP SERPL CALCULATED.3IONS-SCNC: 5 MMOL/L (ref 3–16)
BUN SERPL-MCNC: 29 MG/DL (ref 7–20)
CALCIUM SERPL-MCNC: 7.4 MG/DL (ref 8.3–10.6)
CHLORIDE SERPL-SCNC: 98 MMOL/L (ref 99–110)
CO2 SERPL-SCNC: 31 MMOL/L (ref 21–32)
CREAT SERPL-MCNC: 2.4 MG/DL (ref 0.6–1.2)
GFR SERPLBLD CREATININE-BSD FMLA CKD-EPI: 22 ML/MIN/{1.73_M2}
GLUCOSE BLD-MCNC: 175 MG/DL (ref 70–99)
GLUCOSE BLD-MCNC: 176 MG/DL (ref 70–99)
GLUCOSE BLD-MCNC: 178 MG/DL (ref 70–99)
GLUCOSE BLD-MCNC: 247 MG/DL (ref 70–99)
GLUCOSE BLD-MCNC: 333 MG/DL (ref 70–99)
GLUCOSE SERPL-MCNC: 202 MG/DL (ref 70–99)
MAGNESIUM SERPL-MCNC: 1.7 MG/DL (ref 1.8–2.4)
PERFORMED ON: ABNORMAL
POTASSIUM SERPL-SCNC: 4.9 MMOL/L (ref 3.5–5.1)
SODIUM SERPL-SCNC: 134 MMOL/L (ref 136–145)

## 2024-07-10 PROCEDURE — 6360000002 HC RX W HCPCS

## 2024-07-10 PROCEDURE — 2060000000 HC ICU INTERMEDIATE R&B

## 2024-07-10 PROCEDURE — 6360000002 HC RX W HCPCS: Performed by: INTERNAL MEDICINE

## 2024-07-10 PROCEDURE — 6370000000 HC RX 637 (ALT 250 FOR IP): Performed by: STUDENT IN AN ORGANIZED HEALTH CARE EDUCATION/TRAINING PROGRAM

## 2024-07-10 PROCEDURE — 83735 ASSAY OF MAGNESIUM: CPT

## 2024-07-10 PROCEDURE — 80048 BASIC METABOLIC PNL TOTAL CA: CPT

## 2024-07-10 PROCEDURE — 97116 GAIT TRAINING THERAPY: CPT

## 2024-07-10 PROCEDURE — 6370000000 HC RX 637 (ALT 250 FOR IP): Performed by: NURSE PRACTITIONER

## 2024-07-10 PROCEDURE — 6370000000 HC RX 637 (ALT 250 FOR IP): Performed by: INTERNAL MEDICINE

## 2024-07-10 PROCEDURE — 97530 THERAPEUTIC ACTIVITIES: CPT

## 2024-07-10 PROCEDURE — 6360000002 HC RX W HCPCS: Performed by: STUDENT IN AN ORGANIZED HEALTH CARE EDUCATION/TRAINING PROGRAM

## 2024-07-10 PROCEDURE — 97110 THERAPEUTIC EXERCISES: CPT

## 2024-07-10 PROCEDURE — 2580000003 HC RX 258: Performed by: STUDENT IN AN ORGANIZED HEALTH CARE EDUCATION/TRAINING PROGRAM

## 2024-07-10 PROCEDURE — 36415 COLL VENOUS BLD VENIPUNCTURE: CPT

## 2024-07-10 RX ADMIN — EPOETIN ALFA-EPBX 5000 UNITS: 10000 INJECTION, SOLUTION INTRAVENOUS; SUBCUTANEOUS at 09:38

## 2024-07-10 RX ADMIN — GABAPENTIN 200 MG: 100 CAPSULE ORAL at 20:16

## 2024-07-10 RX ADMIN — INSULIN LISPRO 6 UNITS: 100 INJECTION, SOLUTION INTRAVENOUS; SUBCUTANEOUS at 09:39

## 2024-07-10 RX ADMIN — ENOXAPARIN SODIUM 30 MG: 100 INJECTION SUBCUTANEOUS at 09:38

## 2024-07-10 RX ADMIN — ATORVASTATIN CALCIUM 40 MG: 40 TABLET, FILM COATED ORAL at 20:16

## 2024-07-10 RX ADMIN — SODIUM CHLORIDE, PRESERVATIVE FREE 10 ML: 5 INJECTION INTRAVENOUS at 20:17

## 2024-07-10 RX ADMIN — TRAZODONE HYDROCHLORIDE 100 MG: 50 TABLET ORAL at 20:16

## 2024-07-10 RX ADMIN — OXYCODONE AND ACETAMINOPHEN 1 TABLET: 7.5; 325 TABLET ORAL at 16:59

## 2024-07-10 RX ADMIN — PALIPERIDONE 9 MG: 3 TABLET, EXTENDED RELEASE ORAL at 09:38

## 2024-07-10 RX ADMIN — INSULIN LISPRO 6 UNITS: 100 INJECTION, SOLUTION INTRAVENOUS; SUBCUTANEOUS at 16:58

## 2024-07-10 RX ADMIN — SODIUM CHLORIDE, PRESERVATIVE FREE 10 ML: 5 INJECTION INTRAVENOUS at 09:40

## 2024-07-10 RX ADMIN — TAMSULOSIN HYDROCHLORIDE 0.4 MG: 0.4 CAPSULE ORAL at 09:39

## 2024-07-10 RX ADMIN — INSULIN LISPRO 6 UNITS: 100 INJECTION, SOLUTION INTRAVENOUS; SUBCUTANEOUS at 12:37

## 2024-07-10 RX ADMIN — GABAPENTIN 200 MG: 100 CAPSULE ORAL at 09:38

## 2024-07-10 RX ADMIN — INSULIN GLARGINE 9 UNITS: 100 INJECTION, SOLUTION SUBCUTANEOUS at 09:40

## 2024-07-10 ASSESSMENT — PAIN SCALES - WONG BAKER
WONGBAKER_NUMERICALRESPONSE: NO HURT

## 2024-07-10 ASSESSMENT — PAIN DESCRIPTION - LOCATION: LOCATION: BACK

## 2024-07-10 ASSESSMENT — PAIN SCALES - GENERAL: PAINLEVEL_OUTOF10: 6

## 2024-07-10 NOTE — CARE COORDINATION
Received call from Belinda with [facility] and she states she received authorization for patient to discharge to them.  Patient updated.

## 2024-07-10 NOTE — PROGRESS NOTES
V2.0    [facility] [place] [place]      Name:  Agustina Fried /Age/Sex: 1960  (64 y.o. female)   MRN & CSN:  [id number] & [id number] Encounter Date/Time: 7/10/2024 12:50 PM EDT   Location:   PCP: Viridiana Blanco, APRN - NP     Attending:Charlie Gunn MD       Hospital Day: 10    Assessment and Recommendations   Agustina Fried is a 64 y.o. female with a pmh of hypertension, diabetes mellitus, GERD, hyperlipidemia, who presents with Hyperglycemia due to diabetes mellitus (HCC)     Acute on chronic diastolic heart failure  ESBL Ecoli UTI  Acute on chronic normocytic anemia  DM 2, uncontrolled with hyperglycemia  Hyperglycemia, resolved  Lactic acidosis  Hypertension  Hyperlipidemia  GERD  Chronic anemia  CKD stage IIIb    Plan:     Appreciated Nephrology input creatinine went up as high as 2.8 currently down to 2.4 continue to monitor the creatinine and follow-up with nephrology recommendations medically stable patient is making some amount of urine  Today's last day of antibiotic.  Completed 5-day course of meropenem.  Treated Hyper K.  SHARRON worsened yesterday and now is stable.  Probably multifactorial patient has shown evidence of urinary retention we will continue to monitor and if continues to retain will obtain renal ultrasound and CT abdomen and pelvis to assess for hydronephrosis and cause of obstruction.  Patient is likely also having some prerenal azotemia which noted nephrology plan for decreasing diuresis.  Appreciate nephrology adjusting dose of gabapentin.  Daily weight is  Strict I's and O's  I increase Lantus to 9 units and mealtime insulin to 6 units due to persistent hyperglycemia.  Anemia is most likely due to CKD 3B baseline EPO level and reticulocyte count.  Will defer to nephrology for Aranesp if hemoglobin drops however currently hemoglobin is adequate.  Continue other home medication for her comorbidities  Labs in a.m.  PT recommend rehab, awaiting placement.     Comment: Please

## 2024-07-10 NOTE — PROGRESS NOTES
specialist:Dr. Boone     Carotid stenosis, bilateral:<50%:per US 7/2016 7/15/2016    Carpal tunnel syndrome 10/18/2008    Cervical cancer screening 2014    Nml per pt'.    Coronary artery disease of native artery of native heart with stable angina pectoris (HCC) 6/9/2020    DDD (degenerative disc disease), lumbar 7/18/2018    Depression     under care of pschiatrist:Dr. 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 myelopathy or radiculopathy 3/10/2017    Transient cerebral ischemia 07/15/2016    TIA:7/10/16    Unspecified cerebral artery occlusion with cerebral infarction     TIA          Medication:     Current Facility-Administered Medications: gabapentin (NEURONTIN) capsule 200 mg, 200 mg, Oral, BID  [Held by provider] torsemide (DEMADEX) tablet 20 mg, 20 mg, Oral, Daily  insulin glargine (LANTUS) injection vial 9 Units, 9 Units, SubCUTAneous, Daily  insulin lispro (HUMALOG,ADMELOG) injection vial 6 Units, 6 Units, SubCUTAneous, TID WC  glucose chewable tablet 16 g, 4 tablet, Oral, PRN  dextrose bolus 10% 125 mL, 125 mL, IntraVENous, PRN **OR** dextrose bolus 10% 250 mL, 250 mL, IntraVENous, PRN  glucagon injection 1 mg, 1 mg, SubCUTAneous, PRN  dextrose 10 % infusion, , IntraVENous, Continuous PRN  tamsulosin (FLOMAX) capsule 0.4 mg, 0.4 mg, Oral, Daily  0.9 % sodium chloride  Negative 07/01/2024 11:04 PM    GLUCOSEU >=1000 07/01/2024 11:04 PM    KETUA Negative 07/01/2024 11:04 PM    UROBILINOGEN 0.2 07/01/2024 11:04 PM    BILIRUBINUR Negative 07/01/2024 11:04 PM        ----------------------------------------------------------  Please call with questions at      24 Hrs Answering service [phone]  Perfect serve, or cell phone 7 am - 5pm  Glen Plascencia MD   [facility]

## 2024-07-10 NOTE — PROGRESS NOTES
Occupational Therapy  Facility/Department: [facility] A2 CARD TELEMETRY  Daily Treatment Note  NAME: Agustina Fried  : 1960  MRN: [id number]    Date of Service: 7/10/2024    Discharge Recommendations:  Subacute/Skilled Nursing Facility  OT Equipment Recommendations  Equipment Needed: No (defer)    Therapy discharge recommendations take into account each patient's current medical complexities and are subject to input/oversight from the patient's healthcare team.   Barriers to Home Discharge:   [x] Steps to access home entry or bed/bath:   [x] Unable to transfer, ambulate, or propel wheelchair household distances without assist   [x] Limited available assist at home upon discharge    [] Patient or family requests d/c to post-acute facility    [] Poor cognition/safety awareness for d/c to home alone    []Unable to maintain ordered weight bearing status    [] Patient with salient signs of long-standing immobility   [x] Patient is at risk for falls    [x] Other: Decreased safety/IND with ADLs    If pt is unable to be seen after this session, please let this note serve as discharge summary.  Please see case management note for discharge disposition.  Thank you.    Patient Diagnosis(es): The primary encounter diagnosis was Acute on chronic congestive heart failure, unspecified heart failure type (HCC). Diagnoses of Shortness of breath, Hyperglycemia, Stage 3 chronic kidney disease, unspecified whether stage 3a or 3b CKD (HCC), and Congestive heart failure, unspecified HF chronicity, unspecified heart failure type (HCC) were also pertinent to this visit.     Assessment     CHF education provided to pt this session. Pt verbalized understanding of education but would benefit from reinforcement. Pt demo'd what appeared to be slightly decreased LUE coordination during UB therex this session, but unsure if this was more 2/2 fatigue. Pt completed stand step transfer EOB>chair with CGA and UB dressing with Min A. Pt continues to  7/10  Short Term Goal 2: Perform toileting with CGA  Short Term Goal 3: Perform UE exercises 15x each for endurance by 7/11-MET 7/10  Short Term Goal 4: Perform 2 standing level ADL tasks at sink with CGA  Short Term Goal 5: Pt will meet 2 OT HF goals.  Patient Goals   Patient goals : \"Go home\"    AM-PAC - ADL  AM-PAC Daily Activity - Inpatient   How much help is needed for putting on and taking off regular lower body clothing?: A Lot  How much help is needed for bathing (which includes washing, rinsing, drying)?: A Lot  How much help is needed for toileting (which includes using toilet, bedpan, or urinal)?: A Lot  How much help is needed for putting on and taking off regular upper body clothing?: A Little  How much help is needed for taking care of personal grooming?: A Little  How much help for eating meals?: None  AM-PAC Inpatient Daily Activity Raw Score: 16  AM-PAC Inpatient ADL T-Scale Score : 35.96  ADL Inpatient CMS 0-100% Score: 53.32  ADL Inpatient CMS G-Code Modifier : CK    Therapy Time   Individual Concurrent Group Co-treatment   Time In 0850         Time Out 0928         Minutes 38         Timed Code Treatment Minutes: 38 Minutes       Estrella Benavides OT

## 2024-07-10 NOTE — PROGRESS NOTES
Physical Therapy  Facility/Department: [facility] A2 CARD TELEMETRY  Daily Treatment Note  NAME: Agustina Fried  : 1960  MRN: [id number]    Date of Service: 7/10/2024    Discharge Recommendations:  Subacute/Skilled Nursing Facility   PT Equipment Recommendations  Equipment Needed: No    Patient Diagnosis(es): The primary encounter diagnosis was Acute on chronic congestive heart failure, unspecified heart failure type (HCC). Diagnoses of Shortness of breath, Hyperglycemia, Stage 3 chronic kidney disease, unspecified whether stage 3a or 3b CKD (HCC), and Congestive heart failure, unspecified HF chronicity, unspecified heart failure type (HCC) were also pertinent to this visit.    Assessment   Assessment: Pt seen for PT tx this pm; agreeable to participate. Pt making good progress toward goals as she is able to ambulate further distances this session. Pt completes bed mobility with supv, STS transfers with SBA and RW, and ambulates 80 ft with RW and CGA. Pt continues to be limited by decreased safety awareness, strength, and activity tolerance and would benefit from continued skilled PT in SNF setting for dc.  Activity Tolerance: Patient tolerated treatment well  Equipment Needed: No     Plan    Physical Therapy Plan  General Plan: 3-5 times per week  Current Treatment Recommendations: Strengthening;Transfer training;Gait training;Home exercise program;Therapeutic activities;Endurance training;Safety education & training;Functional mobility training;Balance training     Restrictions  Restrictions/Precautions  Restrictions/Precautions: Up as Tolerated, General Precautions, Fall Risk     Subjective    Subjective  Subjective: Pt supine in bed; agreeable to PT tx  Pain: reports back pain, does not rate  Orientation  Overall Orientation Status: Within Functional Limits  Orientation Level: Oriented X4  Cognition  Overall Cognitive Status: Exceptions  Arousal/Alertness: Appropriate responses to stimuli  Following Commands:  flexion. Cueing for safety with RW)     PT Exercises  Exercise Treatment: Seated Ther Ex: x20 reps of ankle pumps, LAQ, alt marches, hip abduction, hip adduction with pillow, glute squeezes  Static Standing Balance Exercises: Pt stands without AD x1 minute while adjusting bed sheets with SBA and no LOB     Safety Devices  Type of Devices: All fall risk precautions in place;Call light within reach;Patient at risk for falls;Nurse notified;Left in bed;Bed alarm in place     Goals  Short Term Goals  Time Frame for Short Term Goals: 1 week (7/06) unless otherwise specified  Short Term Goal 1: pt to perform bed mobility modified indep  Short Term Goal 2: pt to perform transfers SBA  Short Term Goal 3: pt to amb with RW at least 50 ft with CG  Short Term Goal 4: pt to participate in therapeutic ex 12-15 reps by 7/10  Short Term Goal 5: Pt to meet at least 3/5 HF program goals  Patient Goals   Patient Goals : \"I don't know what's wrong with me, I hope I get stronger\"    Education  Patient Education  Education Given To: Patient  Education Provided: Role of Therapy;Plan of Care;Transfer Training;Fall Prevention Strategies;Energy Conservation  Education Provided Comments: safety with mobility, importance of mobility  Education Method: Verbal  Barriers to Learning: None  Education Outcome: Verbalized understanding;Continued education needed    AM-PAC - Mobility  AM-PAC Basic Mobility - Inpatient   How much help is needed turning from your back to your side while in a flat bed without using bedrails?: None  How much help is needed moving from lying on your back to sitting on the side of a flat bed without using bedrails?: None  How much help is needed moving to and from a bed to a chair?: A Little  How much help is needed standing up from a chair using your arms?: A Little  How much help is needed walking in hospital room?: A Little  How much help is needed climbing 3-5 steps with a railing?: A Lot  AM-PAC Inpatient Mobility Raw

## 2024-07-11 LAB
ANION GAP SERPL CALCULATED.3IONS-SCNC: 4 MMOL/L (ref 3–16)
BUN SERPL-MCNC: 29 MG/DL (ref 7–20)
CALCIUM SERPL-MCNC: 7.4 MG/DL (ref 8.3–10.6)
CHLORIDE SERPL-SCNC: 102 MMOL/L (ref 99–110)
CO2 SERPL-SCNC: 33 MMOL/L (ref 21–32)
CREAT SERPL-MCNC: 2.2 MG/DL (ref 0.6–1.2)
GFR SERPLBLD CREATININE-BSD FMLA CKD-EPI: 24 ML/MIN/{1.73_M2}
GLUCOSE BLD-MCNC: 139 MG/DL (ref 70–99)
GLUCOSE BLD-MCNC: 166 MG/DL (ref 70–99)
GLUCOSE BLD-MCNC: 231 MG/DL (ref 70–99)
GLUCOSE BLD-MCNC: 265 MG/DL (ref 70–99)
GLUCOSE SERPL-MCNC: 177 MG/DL (ref 70–99)
MAGNESIUM SERPL-MCNC: 1.7 MG/DL (ref 1.8–2.4)
NT-PROBNP SERPL-MCNC: 818 PG/ML (ref 0–124)
PERFORMED ON: ABNORMAL
POTASSIUM SERPL-SCNC: 4.8 MMOL/L (ref 3.5–5.1)
SODIUM SERPL-SCNC: 139 MMOL/L (ref 136–145)

## 2024-07-11 PROCEDURE — 2580000003 HC RX 258: Performed by: STUDENT IN AN ORGANIZED HEALTH CARE EDUCATION/TRAINING PROGRAM

## 2024-07-11 PROCEDURE — 6370000000 HC RX 637 (ALT 250 FOR IP): Performed by: STUDENT IN AN ORGANIZED HEALTH CARE EDUCATION/TRAINING PROGRAM

## 2024-07-11 PROCEDURE — 36415 COLL VENOUS BLD VENIPUNCTURE: CPT

## 2024-07-11 PROCEDURE — 6370000000 HC RX 637 (ALT 250 FOR IP): Performed by: INTERNAL MEDICINE

## 2024-07-11 PROCEDURE — 6370000000 HC RX 637 (ALT 250 FOR IP): Performed by: NURSE PRACTITIONER

## 2024-07-11 PROCEDURE — 80048 BASIC METABOLIC PNL TOTAL CA: CPT

## 2024-07-11 PROCEDURE — 1200000000 HC SEMI PRIVATE

## 2024-07-11 PROCEDURE — 83735 ASSAY OF MAGNESIUM: CPT

## 2024-07-11 PROCEDURE — 83880 ASSAY OF NATRIURETIC PEPTIDE: CPT

## 2024-07-11 PROCEDURE — 6360000002 HC RX W HCPCS: Performed by: STUDENT IN AN ORGANIZED HEALTH CARE EDUCATION/TRAINING PROGRAM

## 2024-07-11 RX ORDER — INSULIN LISPRO 100 [IU]/ML
4 INJECTION, SOLUTION INTRAVENOUS; SUBCUTANEOUS
Status: DISCONTINUED | OUTPATIENT
Start: 2024-07-11 | End: 2024-07-12 | Stop reason: HOSPADM

## 2024-07-11 RX ORDER — TORSEMIDE 20 MG/1
10 TABLET ORAL DAILY
Status: DISCONTINUED | OUTPATIENT
Start: 2024-07-11 | End: 2024-07-12 | Stop reason: HOSPADM

## 2024-07-11 RX ADMIN — INSULIN LISPRO 4 UNITS: 100 INJECTION, SOLUTION INTRAVENOUS; SUBCUTANEOUS at 11:58

## 2024-07-11 RX ADMIN — ENOXAPARIN SODIUM 30 MG: 100 INJECTION SUBCUTANEOUS at 08:55

## 2024-07-11 RX ADMIN — ATORVASTATIN CALCIUM 40 MG: 40 TABLET, FILM COATED ORAL at 21:17

## 2024-07-11 RX ADMIN — TAMSULOSIN HYDROCHLORIDE 0.4 MG: 0.4 CAPSULE ORAL at 08:55

## 2024-07-11 RX ADMIN — INSULIN LISPRO 4 UNITS: 100 INJECTION, SOLUTION INTRAVENOUS; SUBCUTANEOUS at 16:26

## 2024-07-11 RX ADMIN — TORSEMIDE 10 MG: 20 TABLET ORAL at 12:37

## 2024-07-11 RX ADMIN — SODIUM CHLORIDE, PRESERVATIVE FREE 10 ML: 5 INJECTION INTRAVENOUS at 08:55

## 2024-07-11 RX ADMIN — OXYCODONE AND ACETAMINOPHEN 1 TABLET: 7.5; 325 TABLET ORAL at 22:53

## 2024-07-11 RX ADMIN — PALIPERIDONE 9 MG: 3 TABLET, EXTENDED RELEASE ORAL at 09:05

## 2024-07-11 RX ADMIN — GABAPENTIN 200 MG: 100 CAPSULE ORAL at 08:56

## 2024-07-11 RX ADMIN — GABAPENTIN 200 MG: 100 CAPSULE ORAL at 21:17

## 2024-07-11 RX ADMIN — INSULIN GLARGINE 9 UNITS: 100 INJECTION, SOLUTION SUBCUTANEOUS at 08:56

## 2024-07-11 RX ADMIN — TRAZODONE HYDROCHLORIDE 100 MG: 50 TABLET ORAL at 21:17

## 2024-07-11 RX ADMIN — INSULIN LISPRO 4 UNITS: 100 INJECTION, SOLUTION INTRAVENOUS; SUBCUTANEOUS at 08:57

## 2024-07-11 ASSESSMENT — PAIN SCALES - GENERAL
PAINLEVEL_OUTOF10: 0
PAINLEVEL_OUTOF10: 6
PAINLEVEL_OUTOF10: 0

## 2024-07-11 ASSESSMENT — PAIN SCALES - WONG BAKER
WONGBAKER_NUMERICALRESPONSE: NO HURT

## 2024-07-11 ASSESSMENT — PAIN DESCRIPTION - DESCRIPTORS: DESCRIPTORS: ACHING

## 2024-07-11 ASSESSMENT — PAIN DESCRIPTION - ORIENTATION: ORIENTATION: MID

## 2024-07-11 ASSESSMENT — PAIN DESCRIPTION - LOCATION: LOCATION: BACK

## 2024-07-11 ASSESSMENT — PAIN - FUNCTIONAL ASSESSMENT: PAIN_FUNCTIONAL_ASSESSMENT: PREVENTS OR INTERFERES SOME ACTIVE ACTIVITIES AND ADLS

## 2024-07-11 NOTE — PLAN OF CARE
Problem: Discharge Planning  Goal: Discharge to home or other facility with appropriate resources  Outcome: Progressing     Problem: Safety - Adult  Goal: Free from fall injury  Outcome: Progressing     Problem: ABCDS Injury Assessment  Goal: Absence of physical injury  Outcome: Progressing     Problem: Skin/Tissue Integrity  Goal: Absence of new skin breakdown  Description: 1.  Monitor for areas of redness and/or skin breakdown  2.  Assess vascular access sites hourly  3.  Every 4-6 hours minimum:  Change oxygen saturation probe site  4.  Every 4-6 hours:  If on nasal continuous positive airway pressure, respiratory therapy assess nares and determine need for appliance change or resting period.  Outcome: Progressing     Problem: Pain  Goal: Verbalizes/displays adequate comfort level or baseline comfort level  Outcome: Progressing     Problem: Chronic Conditions and Co-morbidities  Goal: Patient's chronic conditions and co-morbidity symptoms are monitored and maintained or improved  7/11/2024 0145 by Pino Womack, RN  Outcome: Progressing  7/10/2024 1403 by Margo Ulrich, RN  Outcome: Progressing

## 2024-07-11 NOTE — PROGRESS NOTES
Ph: [phone], Fax: [phone]           [facility]               Reason for admission:                 Hyperglycemia, fluid overload       Interval History and plan:     In good spirits  Has no complaints  Creatinine coming down to 2.2  Getting closer to her baseline  Has significant edema is still    She does not tolerate diuretics very well  Will start on 10 mg of torsemide hopefully that will be tolerated better                    Assessment :     CKD  1.9 at the time of consult  1.9 on 4/25/2024 at the time of last hospital discharge  Creatinine peaked to 3.1 last hospitalization    Hypertension   BP: (101-131)/(54-88)  Pulse:  [61-71]   BP goal inpatient 130-140 systolic inpatient        Hyponatremia  Sodium 129 on presentation 130  Going up appropriately  Has history of recurrent hyponatremia                  [facility] would like to thank Charlie Gunn MD   for opportunity to serve this patient      Please call with questions at-   24 Hrs Answering service [phone] or  7 am- 5 pm via Perfect serve or cell phone  Dr.Sudhir Temo MD       HPI :     Agustina Fried is a 64 y.o. female presented to   the hospital on 7/1/2024 with severe hyperglycemia, also complaining of the legs.  She got a dose of Lasix at the time of hospitalization.  She was actually sent to the hospital because of very high glucose.  She is known to us for CKD .  Consulted for the same        PMH/PSH/SH/Family History:     Past Medical History:   Diagnosis Date    Abnormal brain MRI 7/20/2017    Partially empty sella and minimal chronic small vessel ischemic disease    Acute bilateral low back pain without sciatica 11/2/2016    SHARRON (acute kidney injury) ([facility]) 7/5/2017    Arthritis     back    Bipolar disorder ([facility]) 10/18/2008    CAD (coronary artery disease)     stent placed 6/8/20    Cancer ([facility]) 2015    bilateral breast:s/p lumpectomy/radiation:under care care of breast specialist:Dr. Boone      PRN  [Held by provider] amLODIPine (NORVASC) tablet 5 mg, 5 mg, Oral, Daily  insulin lispro (HUMALOG,ADMELOG) injection vial 0-16 Units, 0-16 Units, SubCUTAneous, TID WC  lidocaine 4 % external patch 1 patch, 1 patch, TransDERmal, Daily  oxyCODONE-acetaminophen (PERCOCET) 7.5-325 MG per tablet 1 tablet, 1 tablet, Oral, Q4H PRN  paliperidone (INVEGA) extended release tablet 9 mg, 9 mg, Oral, QAM  atorvastatin (LIPITOR) tablet 40 mg, 40 mg, Oral, Nightly  traZODone (DESYREL) tablet 100 mg, 100 mg, Oral, Nightly  sodium chloride flush 0.9 % injection 5-40 mL, 5-40 mL, IntraVENous, 2 times per day  sodium chloride flush 0.9 % injection 5-40 mL, 5-40 mL, IntraVENous, PRN  enoxaparin Sodium (LOVENOX) injection 30 mg, 30 mg, SubCUTAneous, Daily  ondansetron (ZOFRAN-ODT) disintegrating tablet 4 mg, 4 mg, Oral, Q8H PRN **OR** ondansetron (ZOFRAN) injection 4 mg, 4 mg, IntraVENous, Q6H PRN  polyethylene glycol (GLYCOLAX) packet 17 g, 17 g, Oral, Daily PRN  acetaminophen (TYLENOL) tablet 650 mg, 650 mg, Oral, Q6H PRN **OR** acetaminophen (TYLENOL) suppository 650 mg, 650 mg, Rectal, Q6H PRN  perflutren lipid microspheres (DEFINITY) injection 1.5 mL, 1.5 mL, IntraVENous, ONCE PRN    Vitals :     Vitals:    07/11/24 1150   BP: (!) 131/57   Pulse: 66   Resp:    Temp: 98.6 °F (37 °C)   SpO2: 98%          Physical Examination :     appearance: Alert, orientated  Respiratory: no distress  Cardiovascular: no visibly  raised JVD, Edema 2- 3+   Abdomen: -  soft  Other relevant findings: -      Labs :     CBC:   No results for input(s): \"WBC\", \"HGB\", \"HCT\", \"PLT\" in the last 72 hours.    BMP:    Recent Labs     07/09/24  0513 07/10/24  0447 07/11/24  0458   * 134* 139   K 5.2* 4.9 4.8   CL 97* 98* 102   CO2 34* 31 33*   BUN 29* 29* 29*   CREATININE 2.8* 2.4* 2.2*   GLUCOSE 131* 202* 177*   MG 1.60* 1.70* 1.70*       Lab Results   Component Value Date/Time    COLORU Straw 07/01/2024 11:04 PM    NITRU Negative 07/01/2024 11:04 PM

## 2024-07-11 NOTE — PROGRESS NOTES
V2.0    [facility] [place]      Name:  Agustina Fried /Age/Sex: 1960  (64 y.o. female)   MRN & CSN:  [id number] & [id number] Encounter Date/Time: 2024 12:50 PM EDT   Location:   PCP: Viridiana Blanco, APRN - NP     Attending:Charlie Gunn MD       Hospital Day: 11    Assessment and Recommendations   Agustina Fried is a 64 y.o. female with a pmh of hypertension, diabetes mellitus, GERD, hyperlipidemia, who presents with Hyperglycemia due to diabetes mellitus (HCC)     Acute on chronic diastolic heart failure  ESBL Ecoli UTI  Acute on chronic normocytic anemia  DM 2, uncontrolled with hyperglycemia  Hyperglycemia, resolved  Lactic acidosis  Hypertension  Hyperlipidemia  GERD  Chronic anemia  CKD stage IIIb    Plan:     Appreciated Nephrology input creatinine went up as high as 2.8 currently down to 2.2 continue to monitor the creatinine and follow-up with nephrology recommendations medically stable patient is making some amount of urine  Today's last day of antibiotic.  Completed 5-day course of meropenem.  Treated Hyper K.  SHARRON worsened yesterday and now is stable.  Probably multifactorial patient has shown evidence of urinary retention we will continue to monitor and if continues to retain will obtain renal ultrasound and CT abdomen and pelvis to assess for hydronephrosis and cause of obstruction.  Patient is likely also having some prerenal azotemia which noted nephrology plan for decreasing diuresis.  Appreciate nephrology adjusting dose of gabapentin.  Daily weight is  Strict I's and O's  I increased Lantus to 9 units and mealtime insulin to 4 units due to persistent hyperglycemia.  Anemia is most likely due to CKD 3B baseline EPO level and reticulocyte count.  Will defer to nephrology for Aranesp if hemoglobin drops however currently hemoglobin is adequate.  Continue other home medication for her comorbidities  Labs in a.m.  PT recommend rehab, awaiting placement.     Comment: Please  116/54  101/88 (!) 131/57   Pulse: 61  71 66   Resp: 18  17    Temp: 97.9 °F (36.6 °C)  98.6 °F (37 °C) 98.6 °F (37 °C)   TempSrc: Oral  Oral Oral   SpO2: 98%  97% 98%   Weight:  59.6 kg (131 lb 4.8 oz)     Height:             Physical Exam:      General: NAD  Eyes: EOMI  ENT: neck supple  Cardiovascular: Regular rate.  Respiratory: Clear to auscultation  Gastrointestinal: Soft, non tender  Genitourinary: no suprapubic tenderness  Musculoskeletal: Trace pedal edema  Skin: warm, dry  Neuro: Alert.  Psych: Mood appropriate.         Medications:   Medications:    insulin lispro  4 Units SubCUTAneous TID WC    torsemide  10 mg Oral Daily    gabapentin  200 mg Oral BID    insulin glargine  9 Units SubCUTAneous Daily    tamsulosin  0.4 mg Oral Daily    insulin lispro  0-16 Units SubCUTAneous TID WC    lidocaine  1 patch TransDERmal Daily    paliperidone  9 mg Oral QAM    atorvastatin  40 mg Oral Nightly    traZODone  100 mg Oral Nightly    sodium chloride flush  5-40 mL IntraVENous 2 times per day    enoxaparin  30 mg SubCUTAneous Daily      Infusions:    dextrose      sodium chloride       PRN Meds: glucose, 4 tablet, PRN  dextrose bolus, 125 mL, PRN   Or  dextrose bolus, 250 mL, PRN  glucagon (rDNA), 1 mg, PRN  dextrose, , Continuous PRN  sodium chloride, , PRN  oxyCODONE-acetaminophen, 1 tablet, Q4H PRN  sodium chloride flush, 5-40 mL, PRN  ondansetron, 4 mg, Q8H PRN   Or  ondansetron, 4 mg, Q6H PRN  polyethylene glycol, 17 g, Daily PRN  acetaminophen, 650 mg, Q6H PRN   Or  acetaminophen, 650 mg, Q6H PRN  perflutren lipid microspheres, 1.5 mL, ONCE PRN        Labs and Imaging   XR CHEST PORTABLE    Result Date: 7/1/2024  EXAMINATION: ONE XRAY VIEW OF THE CHEST 7/1/2024 9:25 pm COMPARISON: 04/17/2024 HISTORY: ORDERING SYSTEM PROVIDED HISTORY: shortness of breath TECHNOLOGIST PROVIDED HISTORY: Reason for exam:->shortness of breath Reason for Exam: sob FINDINGS: Increasing small right pleural effusion with right basilar

## 2024-07-11 NOTE — PLAN OF CARE
CHF Care Plan      Patient's EF (Ejection Fraction) is greater than 40%    Heart Failure Medications:  Diuretics:: Torsemide    (One of the following REQUIRED for EF </= 40%/SYSTOLIC FAILURE but MAY be used in EF% >40%/DIASTOLIC FAILURE)        ACE:: None        ARB:: None         ARNI:: None    (Beta Blockers)  NON- Evidenced Based Beta Blocker (for EF% >40%/DIASTOLIC FAILURE): None    Evidenced Based Beta Blocker::(REQUIRED for EF% <40%/SYSTOLIC FAILURE) None  ...................................................................................................................................................    Failed to redirect to the Timeline version of the [employer] SmartLink.      Patient's weights and intake/output reviewed    Daily Weight log at bedside, patient/family participation in use of log: \"yes    Patient's current weight today shows a difference of 4 lbs less than last documented weight.      Intake/Output Summary (Last 24 hours) at 7/11/2024 0253  Last data filed at 7/10/2024 1818  Gross per 24 hour   Intake 390 ml   Output --   Net 390 ml       Education Booklet Provided: yes    Comorbidities Reviewed Yes    Patient has a past medical history of Abnormal brain MRI, Acute bilateral low back pain without sciatica, SHARRON (acute kidney injury) ([facility]), Arthritis, Bipolar disorder ([facility]), CAD (coronary artery disease), Cancer (HCC), Carotid stenosis, bilateral:<50%:per US 7/2016, Carpal tunnel syndrome, Cervical cancer screening, Coronary artery disease of native artery of native heart with stable angina pectoris (HCC), DDD (degenerative disc disease), lumbar, Depression, Depression/anxiety, Depression/anxiety, Diabetes mellitus (HCC), Gout, History of mammogram, History of therapeutic radiation, Hyperlipidemia, Hypertension, 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

## 2024-07-11 NOTE — PROGRESS NOTES
SBAR called to Mariely RN for patient transfer to room 120, all questions answered. Patient is Aox4, VSS, Nsr on RA, and all belongings transferred with patient via bed. Family called with updated room move.

## 2024-07-11 NOTE — PLAN OF CARE
Problem: Discharge Planning  Goal: Discharge to home or other facility with appropriate resources  7/11/2024 1039 by Sheri Rojas RN  Outcome: Progressing  Flowsheets (Taken 7/11/2024 0800)  Discharge to home or other facility with appropriate resources:   Identify barriers to discharge with patient and caregiver   Arrange for needed discharge resources and transportation as appropriate     Problem: Safety - Adult  Goal: Free from fall injury  7/11/2024 1039 by Sheri Rojas RN  Outcome: Progressing     Problem: ABCDS Injury Assessment  Goal: Absence of physical injury  7/11/2024 1039 by Sheri Rojas RN  Outcome: Progressing     Problem: Skin/Tissue Integrity  Goal: Absence of new skin breakdown  Description: 1.  Monitor for areas of redness and/or skin breakdown  2.  Assess vascular access sites hourly  3.  Every 4-6 hours minimum:  Change oxygen saturation probe site  4.  Every 4-6 hours:  If on nasal continuous positive airway pressure, respiratory therapy assess nares and determine need for appliance change or resting period.  7/11/2024 1039 by Sheri Rojas RN  Outcome: Progressing    CHF Care Plan      Patient's EF (Ejection Fraction) is greater than 40%    Heart Failure Medications:  Diuretics:: None    (One of the following REQUIRED for EF </= 40%/SYSTOLIC FAILURE but MAY be used in EF% >40%/DIASTOLIC FAILURE)        ACE:: None        ARB:: None         ARNI:: None    (Beta Blockers)  NON- Evidenced Based Beta Blocker (for EF% >40%/DIASTOLIC FAILURE): None    Evidenced Based Beta Blocker::(REQUIRED for EF% <40%/SYSTOLIC FAILURE) None  ...................................................................................................................................................    Failed to redirect to the Timeline version of the [employer] SmartLink.      Patient's weights and intake/output reviewed    Daily Weight log at bedside, patient/family participation in use of log: \"yes    Patient's  of hypo and hyperglycemia and when to seek medical attention if blood sugar is too high or too low.

## 2024-07-11 NOTE — CARE COORDINATION
Spoke with MD who states Nephrology is still following patient and adjusting some medications.  He states patient will likely be able to discharge tomorrow.  Plan is for patient to discharge to [facility] and authorization has been received and good through the 16th.  Placed call to Belinda with [facility] to update her on the above.  She states they will be ready for her tomorrow.  Also updated patient and RN.

## 2024-07-12 VITALS
HEART RATE: 75 BPM | DIASTOLIC BLOOD PRESSURE: 57 MMHG | RESPIRATION RATE: 16 BRPM | BODY MASS INDEX: 23.87 KG/M2 | TEMPERATURE: 97.5 F | OXYGEN SATURATION: 98 % | SYSTOLIC BLOOD PRESSURE: 121 MMHG | HEIGHT: 62 IN | WEIGHT: 129.7 LBS

## 2024-07-12 LAB
ANION GAP SERPL CALCULATED.3IONS-SCNC: 7 MMOL/L (ref 3–16)
BUN SERPL-MCNC: 28 MG/DL (ref 7–20)
CALCIUM SERPL-MCNC: 7.4 MG/DL (ref 8.3–10.6)
CHLORIDE SERPL-SCNC: 100 MMOL/L (ref 99–110)
CO2 SERPL-SCNC: 30 MMOL/L (ref 21–32)
CREAT SERPL-MCNC: 2 MG/DL (ref 0.6–1.2)
GFR SERPLBLD CREATININE-BSD FMLA CKD-EPI: 27 ML/MIN/{1.73_M2}
GLUCOSE BLD-MCNC: 332 MG/DL (ref 70–99)
GLUCOSE SERPL-MCNC: 309 MG/DL (ref 70–99)
MAGNESIUM SERPL-MCNC: 1.7 MG/DL (ref 1.8–2.4)
PERFORMED ON: ABNORMAL
POTASSIUM SERPL-SCNC: 4.9 MMOL/L (ref 3.5–5.1)
SODIUM SERPL-SCNC: 137 MMOL/L (ref 136–145)

## 2024-07-12 PROCEDURE — 6370000000 HC RX 637 (ALT 250 FOR IP): Performed by: INTERNAL MEDICINE

## 2024-07-12 PROCEDURE — 36415 COLL VENOUS BLD VENIPUNCTURE: CPT

## 2024-07-12 PROCEDURE — 97110 THERAPEUTIC EXERCISES: CPT

## 2024-07-12 PROCEDURE — 6360000002 HC RX W HCPCS: Performed by: STUDENT IN AN ORGANIZED HEALTH CARE EDUCATION/TRAINING PROGRAM

## 2024-07-12 PROCEDURE — 6370000000 HC RX 637 (ALT 250 FOR IP): Performed by: STUDENT IN AN ORGANIZED HEALTH CARE EDUCATION/TRAINING PROGRAM

## 2024-07-12 PROCEDURE — 97530 THERAPEUTIC ACTIVITIES: CPT

## 2024-07-12 PROCEDURE — 6370000000 HC RX 637 (ALT 250 FOR IP): Performed by: NURSE PRACTITIONER

## 2024-07-12 PROCEDURE — 97116 GAIT TRAINING THERAPY: CPT

## 2024-07-12 PROCEDURE — 80048 BASIC METABOLIC PNL TOTAL CA: CPT

## 2024-07-12 PROCEDURE — 97535 SELF CARE MNGMENT TRAINING: CPT

## 2024-07-12 PROCEDURE — 83735 ASSAY OF MAGNESIUM: CPT

## 2024-07-12 RX ORDER — TORSEMIDE 10 MG/1
10 TABLET ORAL DAILY
Qty: 30 TABLET | Refills: 3 | Status: SHIPPED | OUTPATIENT
Start: 2024-07-13

## 2024-07-12 RX ORDER — GABAPENTIN 100 MG/1
200 CAPSULE ORAL 2 TIMES DAILY
Qty: 120 CAPSULE | Refills: 0 | Status: SHIPPED | OUTPATIENT
Start: 2024-07-12 | End: 2024-08-11

## 2024-07-12 RX ADMIN — TORSEMIDE 10 MG: 20 TABLET ORAL at 08:18

## 2024-07-12 RX ADMIN — ENOXAPARIN SODIUM 30 MG: 100 INJECTION SUBCUTANEOUS at 08:18

## 2024-07-12 RX ADMIN — PALIPERIDONE 9 MG: 3 TABLET, EXTENDED RELEASE ORAL at 08:34

## 2024-07-12 RX ADMIN — OXYCODONE AND ACETAMINOPHEN 1 TABLET: 7.5; 325 TABLET ORAL at 08:18

## 2024-07-12 RX ADMIN — INSULIN LISPRO 12 UNITS: 100 INJECTION, SOLUTION INTRAVENOUS; SUBCUTANEOUS at 08:19

## 2024-07-12 RX ADMIN — GABAPENTIN 200 MG: 100 CAPSULE ORAL at 08:18

## 2024-07-12 RX ADMIN — INSULIN GLARGINE 9 UNITS: 100 INJECTION, SOLUTION SUBCUTANEOUS at 08:19

## 2024-07-12 RX ADMIN — TAMSULOSIN HYDROCHLORIDE 0.4 MG: 0.4 CAPSULE ORAL at 08:18

## 2024-07-12 RX ADMIN — INSULIN LISPRO 4 UNITS: 100 INJECTION, SOLUTION INTRAVENOUS; SUBCUTANEOUS at 08:20

## 2024-07-12 ASSESSMENT — PAIN DESCRIPTION - ORIENTATION: ORIENTATION: MID

## 2024-07-12 ASSESSMENT — PAIN DESCRIPTION - LOCATION: LOCATION: BACK

## 2024-07-12 ASSESSMENT — PAIN SCALES - GENERAL: PAINLEVEL_OUTOF10: 6

## 2024-07-12 ASSESSMENT — PAIN DESCRIPTION - DESCRIPTORS: DESCRIPTORS: ACHING

## 2024-07-12 NOTE — PROGRESS NOTES
Pt  Aox4 VSS. Pt stated no pain SOB. Pt NSR to SB on the monitor. Call light in reach. Bed alarm on.

## 2024-07-12 NOTE — PROGRESS NOTES
Physical Therapy  Facility/Department: [place] [address] [facility]  Daily Treatment Note  NAME: Agustina Fried  : 1960  MRN: [id number]    Date of Service: 2024    Discharge Recommendations:  Subacute/Skilled Nursing Facility        Patient Diagnosis(es): The primary encounter diagnosis was Acute on chronic congestive heart failure, unspecified heart failure type (HCC). Diagnoses of Shortness of breath, Hyperglycemia, Stage 3 chronic kidney disease, unspecified whether stage 3a or 3b CKD (HCC), and Congestive heart failure, unspecified HF chronicity, unspecified heart failure type (HCC) were also pertinent to this visit.    Assessment   Assessment: Pt seen for PT tx this am; agreeable to participate. Pt making good progress toward goals as she is able to ambulate further distances the last couple sessions. Pt completes bed mobility with supv, STS transfers with SBA and RW, and ambulates 80 ft with RW and CGA. Pt continues to be limited by decreased safety awareness, strength, and activity tolerance and would benefit from continued skilled PT in SNF setting for dc.  Activity Tolerance: Patient tolerated treatment well     Plan    Physical Therapy Plan  General Plan: 3-5 times per week  Current Treatment Recommendations: Strengthening;Transfer training;Gait training;Home exercise program;Therapeutic activities;Endurance training;Safety education & training;Functional mobility training;Balance training     Restrictions  Restrictions/Precautions  Restrictions/Precautions: Up as Tolerated, Fall Risk, Contact Precautions  Position Activity Restriction  Other position/activity restrictions: Contact for MDRO and ESBL     Subjective    Subjective  Subjective: Pt supine in bed; agreeable to PT tx  Pain: Pt denies pain.  Orientation  Overall Orientation Status: Within Functional Limits (off on date by 2 weeks)  Cognition  Overall Cognitive Status: Exceptions  Attention Span: Difficulty dividing attention  Safety  Judgement: Decreased awareness of need for safety     Objective   Vitals  Vitals:    07/12/24    BP: (!) 121/57   Pulse: 75   Resp:    Temp:    SpO2: 98%          Bed Mobility Training  Bed Mobility Training: Yes  Supine to Sit: Supervision (to R with HOB elevated and use of bed rail)  Scooting: Stand-by assistance (to EOB)  Balance  Sitting: Intact  Standing: Impaired (RW)  Standing - Static: Good;Constant support  Standing - Dynamic: Fair;Constant support  Transfer Training  Transfer Training: Yes (RW)  Interventions: Verbal cues;Safety awareness training  Sit to Stand: Stand-by assistance  Stand to Sit: Stand-by assistance  Toilet Transfer: Stand-by assistance (yen grab bars)  Gait  Gait Training: Yes  Overall Level of Assistance: Contact-guard assistance;Additional time;Adaptive equipment  Distance (ft): 10 Feet (+ 80')  Assistive Device: Gait belt;Walker, rolling  Interventions: Safety awareness training;Verbal cues  Base of Support: Narrowed  Speed/Olga: Slow  Gait Abnormalities: Decreased step clearance     PT Exercises  Exercise Treatment: Ther ex per CHF edu X 10 in seated postition.  Pt rates exs between 6-11 on RICK and amb 13 today.     Safety Devices  Type of Devices: Left in chair;Chair alarm in place;Call light within reach;Nurse notified;Gait belt       Goals  Short Term Goals  Time Frame for Short Term Goals: 1 week (7/06) unless otherwise specified; 7.11 con't goals  Short Term Goal 1: pt to perform bed mobility modified indep  Short Term Goal 2: pt to perform transfers SBA  Short Term Goal 3: pt to amb with RW at least 50 ft with CG  Short Term Goal 4: pt to participate in therapeutic ex 12-15 reps by 7/10  Short Term Goal 5: Pt to meet at least 3/5 HF program goals  Patient Goals   Patient Goals : \"I don't know what's wrong with me, I hope I get stronger\"    Education  Patient Education  Education Given To: Patient  Education Provided: Role of Therapy;Plan of Care;Transfer Training;Fall

## 2024-07-12 NOTE — PROGRESS NOTES
Ph: [phone], Fax: [phone]           [facility]               Reason for admission:                 Hyperglycemia, fluid overload       Interval History and plan:     Creatinine is 2  Other electrolytes are overall stable  In good spirits  On 10 mg of torsemide  Continue on  10 mg of torsemide okay to discharge from renal perspective  Will follow-up outpatient                  Assessment :     CKD  1.9 at the time of consult  1.9 on 4/25/2024 at the time of last hospital discharge  Creatinine peaked to 3.1 last hospitalization    Hypertension   BP: (121-144)/(57-67)  Pulse:  [62-75]   BP goal inpatient 130-140 systolic inpatient        Hyponatremia  Sodium 129 on presentation 130  Going up appropriately  Has history of recurrent hyponatremia                  [facility] would like to thank Charlie Gunn MD   for opportunity to serve this patient      Please call with questions at-   24 Hrs Answering service [phone] or  7 am- 5 pm via Perfect serve or cell phone  Dr.Sudhir Temo MD       HPI :     Agustina Fried is a 64 y.o. female presented to   the hospital on 7/1/2024 with severe hyperglycemia, also complaining of the legs.  She got a dose of Lasix at the time of hospitalization.  She was actually sent to the hospital because of very high glucose.  She is known to us for CKD .  Consulted for the same        PMH/PSH/SH/Family History:     Past Medical History:   Diagnosis Date    Abnormal brain MRI 7/20/2017    Partially empty sella and minimal chronic small vessel ischemic disease    Acute bilateral low back pain without sciatica 11/2/2016    SHARRON (acute kidney injury) ([facility]) 7/5/2017    Arthritis     back    Bipolar disorder ([facility]) 10/18/2008    CAD (coronary artery disease)     stent placed 6/8/20    Cancer ([facility]) 2015    bilateral breast:s/p lumpectomy/radiation:under care care of breast specialist:Dr. Boone     Carotid stenosis, bilateral:<50%:per US 7/2016 7/15/2016     BILIRUBINUR Negative 07/01/2024 11:04 PM        ----------------------------------------------------------  Please call with questions at      24 Hrs Answering service [phone]  Perfect serve, or cell phone 7 am - 5pm  MD betty [email or web address]

## 2024-07-12 NOTE — PLAN OF CARE
Problem: Discharge Planning  Goal: Discharge to home or other facility with appropriate resources  7/12/2024 1111 by Lauren Adams RN  Outcome: Completed     Problem: Safety - Adult  Goal: Free from fall injury  7/12/2024 1111 by Lauren Adams RN  Outcome: Completed     Problem: ABCDS Injury Assessment  Goal: Absence of physical injury  7/12/2024 1111 by Lauren Adams RN  Outcome: Completed     Problem: Skin/Tissue Integrity  Goal: Absence of new skin breakdown  Description: 1.  Monitor for areas of redness and/or skin breakdown  2.  Assess vascular access sites hourly  3.  Every 4-6 hours minimum:  Change oxygen saturation probe site  4.  Every 4-6 hours:  If on nasal continuous positive airway pressure, respiratory therapy assess nares and determine need for appliance change or resting period.  7/12/2024 1111 by Lauren Adams RN  Outcome: Completed     Problem: Pain  Goal: Verbalizes/displays adequate comfort level or baseline comfort level  7/12/2024 1111 by Lauren Adams RN  Outcome: Completed     Problem: Chronic Conditions and Co-morbidities  Goal: Patient's chronic conditions and co-morbidity symptoms are monitored and maintained or improved  Outcome: Completed

## 2024-07-12 NOTE — PROGRESS NOTES
Patient to discharge to [facility], patient transferred to [facility], patient with personal belongings including purse and clothing. Patient paperwork provided to transport company, patient telemetry removed. Attempted to call report to [facility] at [phone], no answer at the number. Will attempt to call again. Patient discharge complete.

## 2024-07-12 NOTE — DISCHARGE INSTR - COC
tract Z90.49    Atherosclerotic heart disease of native coronary artery without angina pectoris I25.10    Cognitive communication deficit R41.841    Hypertensive heart and chronic kidney disease with heart failure and stage 1 through stage 4 chronic kidney disease, or unspecified chronic kidney disease (HCC) I13.0    Muscle weakness (generalized) M62.81    Occlusion and stenosis of bilateral carotid arteries I65.23    Other lack of coordination R27.8    Repeated falls R29.6    Unspecified abnormalities of gait and mobility R26.9    Acute diarrhea R19.7    Hypertensive urgency I16.0    Acute renal failure superimposed on chronic kidney disease, unspecified acute renal failure type, unspecified CKD stage ([facility]) N17.9, N18.9    Atypical chest pain R07.89    Confusion R41.0    Chronic kidney disease N18.9    C. difficile colitis A04.72    Encephalopathy, metabolic G93.41    DM (diabetes mellitus), secondary, with complications ([facility]) E13.8    Hyperosmolar hyperglycemic state (HHS) ([facility]) E11.00    Acute on chronic heart failure with preserved ejection fraction (HFpEF) ([facility]) I50.33       Isolation/Infection:   Isolation            Contact          Patient Infection Status       Infection Onset Added Last Indicated Last Indicated By Review Planned Expiration Resolved Resolved By    MDRO (multi-drug resistant organism) 24 Alma Rosa Bhandari RN        ESBL (Extended Spectrum Beta Lactamase) 24 Culture, Urine        Resolved    C-diff (Clostridium difficile) 23 Alma Rosa Bhandari RN   24 Infection     Still receiving treatment for previous infection.    C-diff (Clostridium difficile) 23 Clostridium difficile toxin/antigen   24 Infection     C-diff (Clostridium difficile) 23 C. difficile toxin Molecular   23 Infection     C-diff (Clostridium difficile) 23  patient):      RN SIGNATURE:  Electronically signed by Lauren Adams RN on 7/12/24 at 10:43 AM EDT    CASE MANAGEMENT/SOCIAL WORK SECTION    Inpatient Status Date: 7/1/24    Readmission Risk Assessment Score:  Readmission Risk              Risk of Unplanned Readmission:  54           Discharging to Facility/ Agency   [facility]  Skilled Nursing  [address] [facility] [facility] [place] [address]  [phone]     / signature: Electronically signed by Carolyn Grimes RN on 7/12/24 at 10:49 AM EDT    PHYSICIAN SECTION    Prognosis: Fair    Condition at Discharge: Stable    Rehab Potential (if transferring to Rehab): Fair    Recommended Labs or Other Treatments After Discharge:     Physician Certification: I certify the above information and transfer of Agustina Fried  is necessary for the continuing treatment of the diagnosis listed and that she requires Skilled Nursing Facility for greater 30 days.     Update Admission H&P: No change in H&P    PHYSICIAN SIGNATURE:  Electronically signed by Charlie Gunn MD on 7/12/24 at 10:34 AM EDT

## 2024-07-12 NOTE — PROGRESS NOTES
07/12/24 1032   Encounter Summary   Encounter Overview/Reason Follow-up   Service Provided For Patient   Referral/Consult From [facility]   Support System Children   Last Encounter  07/12/24  ( briefly visited)

## 2024-07-12 NOTE — DISCHARGE SUMMARY
input(s): \"AST\", \"ALT\", \"BILITOT\", \"ALKPHOS\" in the last 72 hours.    Invalid input(s): \"ALB\"  Lipids:   Lab Results   Component Value Date/Time    CHOL 154 11/27/2023 07:00 PM    HDL 75 11/27/2023 07:00 PM    TRIG 89 11/27/2023 07:00 PM     Hemoglobin A1C:   Lab Results   Component Value Date/Time    LABA1C 10.0 07/02/2024 10:37 AM     TSH:   Lab Results   Component Value Date/Time    TSH 0.86 12/11/2022 04:45 AM     Troponin: No results found for: \"TROPONINT\"  Lactic Acid: No results for input(s): \"LACTA\" in the last 72 hours.  BNP:   Recent Labs     07/11/24  0458   PROBNP 818*     UA:  Lab Results   Component Value Date/Time    NITRU Negative 07/01/2024 11:04 PM    COLORU Straw 07/01/2024 11:04 PM    PHUR 6.0 07/01/2024 11:04 PM    PHUR 5.5 04/18/2024 04:40 PM    WBCUA 21-50 07/01/2024 11:04 PM    RBCUA 11-20 07/01/2024 11:04 PM    YEAST Present 12/10/2023 02:53 PM    BACTERIA Rare 07/01/2024 11:04 PM    CLARITYU Clear 07/01/2024 11:04 PM    SPECGRAV 1.010 01/06/2013 12:11 AM    LEUKOCYTESUR Negative 07/01/2024 11:04 PM    UROBILINOGEN 0.2 07/01/2024 11:04 PM    BILIRUBINUR Negative 07/01/2024 11:04 PM    BLOODU MODERATE 07/01/2024 11:04 PM    GLUCOSEU >=1000 07/01/2024 11:04 PM    GLUCOSEU >=1000 mg/dL 06/07/2010 03:38 PM    KETUA Negative 07/01/2024 11:04 PM    AMORPHOUS 3+ 04/18/2024 04:40 PM     Urine Cultures:   Lab Results   Component Value Date/Time    LABURIN  07/01/2024 11:04 PM     >100,000 CFU/ml  CONTACT PRECAUTIONS INDICATED  Carbapenem antibiotics are the best option for infections caused  by ESBL producing organisms.  Other antibiotic classes are  likely to result in treatment failure, even for organisms  demonstrating in vitro susceptibility.      LABURIN <10,000 CFU/ml  No further workup   07/01/2024 11:04 PM     Blood Cultures:   Lab Results   Component Value Date/Time    BC No Growth after 4 days of incubation. 04/17/2024 06:19 AM     Lab Results   Component Value Date/Time    BLOODCULT2 No

## 2024-07-12 NOTE — DISCHARGE INSTR - DIET

## 2024-07-12 NOTE — PROGRESS NOTES
Occupational Therapy  Facility/Department: [place] [address] REMOTE TELEMETRY  Daily Treatment Note  NAME: Agustina Fried  : 1960  MRN: [id number]    Date of Service: 2024    Discharge Recommendations:  Subacute/Skilled Nursing Facility       Therapy discharge recommendations take into account each patient's current medical complexities and are subject to input/oversight from the patient's healthcare team.   Barriers to Home Discharge:   [x] Steps to access home entry or bed/bath   [x] Unable to transfer, ambulate, or propel wheelchair household distances without assist   [x] Limited available assist at home upon discharge    [] Patient or family requests d/c to post-acute facility    [] Poor cognition/safety awareness for d/c to home alone    [] Unable to maintain ordered weight bearing status    [] Patient with salient signs of long-standing immobility   [x] Patient is at risk for falls    [x] Other: Decreased safety/IND with ADLs    Patient Diagnosis(es): The primary encounter diagnosis was Acute on chronic congestive heart failure, unspecified heart failure type (HCC). Diagnoses of Shortness of breath, Hyperglycemia, Stage 3 chronic kidney disease, unspecified whether stage 3a or 3b CKD (HCC), and Congestive heart failure, unspecified HF chronicity, unspecified heart failure type (HCC) were also pertinent to this visit.     Assessment    Assessment: Pt tolerated session well, pleasant and motivated to participate in activity. Pt verbalizes good understanding of previous CHF education. Pt grossly SBA for functional transfers and mobility, does require VCs for safety and to avoid running into objects with RW on R side. Co-tx collaboration this date with PT staff to safely progress pt toward goals. Pt will have better occupational performance outcomes within a co-treatment than 1:1 session. Pt requiring min A for LB ADLs. Pt functioning below her baseline, recommend SNF at d/c.   Activity Tolerance: Patient  tolerated treatment well  Discharge Recommendations: Subacute/Skilled Nursing Facility      Plan   Occupational Therapy Plan  Times Per Week: 3-5x     Restrictions  Restrictions/Precautions  Restrictions/Precautions: Up as Tolerated;Fall Risk;Contact Precautions  Position Activity Restriction  Other position/activity restrictions: Contact for MDRO and ESBL    Subjective   Subjective  Subjective: Pt in bed at approach, pleasant and agreeable to OT treatment.    Orientation  Overall Orientation Status: Within Functional Limits (off on date by 2 weeks)    Pain: Pt denies pain.    Cognition  Overall Cognitive Status: Exceptions  Attention Span: Difficulty dividing attention  Safety Judgement: Decreased awareness of need for safety        Objective    Vitals  Vitals  Pulse: 75  SpO2: 98 %  O2 Device: None (Room air)  BP: (!) 121/57  MAP (Calculated): 78  BP Location: Right upper arm  BP Method: Automatic  Patient Position: Sitting;Up in chair    Bed Mobility Training  Bed Mobility Training: Yes  Supine to Sit: Supervision (to R with HOB elevated and use of bed rail)  Scooting: Stand-by assistance (to EOB)    Balance  Sitting: Intact  Standing: Impaired (RW)  Standing - Static: Good;Constant support  Standing - Dynamic: Fair;Constant support    Transfer Training  Transfer Training: Yes (RW)  Interventions: Verbal cues;Safety awareness training  Sit to Stand: Stand-by assistance  Stand to Sit: Stand-by assistance  Toilet Transfer: Stand-by assistance (yen grab bars)       ADL  Grooming: Stand by assistance  Grooming Skilled Clinical Factors: handwashing in stance  LE Dressing: Minimal assistance  LE Dressing Skilled Clinical Factors: briefs and socks  Toileting: Contact guard assistance  Toileting Skilled Clinical Factors: VCs for safety and thorough hygiene  Functional Mobility: Contact guard assistance  Functional Mobility Skilled Clinical Factors: RW to/from bathroom, pt running into objects on R side with RW    OT

## 2024-07-12 NOTE — CARE COORDINATION
CASE MANAGEMENT DISCHARGE SUMMARY      Discharge to: [facility]    Precertification completed: yes  Hospital Exemption Notification (HENS) completed: PASSAR completed by the facility    IMM given: (date) N/A    New Durable Medical Equipment ordered/agency: Provided by facility    Transportation:       Medical Transport explained to pt/family. Pt/family voice no agency preference.    Agency used: [facility]   time: 1200   Ambulance form completed: Yes    Confirmed discharge plan with: Patient going to [facility] for rehab     Patient: yes     Family:  yes    Name: Maurice Contact number: [phone]  left for return call with all contact numbers provided     Facility/Agency, name: Arielle brown can pull from [facility] CHINO/AVS Will accompany patient in packet no need to be faxed   Phone number for report to facility: [phone]     RN, name: Lauren    Note: Discharging nurse to complete CHINO, reconcile AVS, and place final copy with patient's discharge packet. RN to ensure that written prescriptions for  Level II medications are sent with patient to the facility as per protocol.  .Carolyn Grimes RN

## 2024-07-17 ENCOUNTER — TELEPHONE (OUTPATIENT)
Dept: ENDOCRINOLOGY | Age: 64
End: 2024-07-17

## 2024-07-17 NOTE — TELEPHONE ENCOUNTER
ADS sending fax requesting CN has been scanned in media     I have efaxed Chart notes to number provided

## 2024-07-29 ENCOUNTER — TELEPHONE (OUTPATIENT)
Dept: CARDIOLOGY CLINIC | Age: 64
End: 2024-07-29

## 2024-07-30 ENCOUNTER — APPOINTMENT (OUTPATIENT)
Dept: CT IMAGING | Age: 64
DRG: 247 | End: 2024-07-30
Payer: MEDICAID

## 2024-07-30 ENCOUNTER — HOSPITAL ENCOUNTER (INPATIENT)
Age: 64
LOS: 3 days | Discharge: ANOTHER HOSPITAL | DRG: 247 | End: 2024-08-02
Attending: EMERGENCY MEDICINE | Admitting: INTERNAL MEDICINE
Payer: MEDICAID

## 2024-07-30 ENCOUNTER — APPOINTMENT (OUTPATIENT)
Dept: GENERAL RADIOLOGY | Age: 64
DRG: 247 | End: 2024-07-30
Payer: MEDICAID

## 2024-07-30 DIAGNOSIS — E16.2 HYPOGLYCEMIA: ICD-10-CM

## 2024-07-30 DIAGNOSIS — K56.7 ILEUS (HCC): Primary | ICD-10-CM

## 2024-07-30 DIAGNOSIS — R11.2 NAUSEA VOMITING AND DIARRHEA: ICD-10-CM

## 2024-07-30 DIAGNOSIS — R19.7 NAUSEA VOMITING AND DIARRHEA: ICD-10-CM

## 2024-07-30 DIAGNOSIS — E86.0 DEHYDRATION: ICD-10-CM

## 2024-07-30 DIAGNOSIS — K31.1 GASTRIC OUTLET OBSTRUCTION: ICD-10-CM

## 2024-07-30 DIAGNOSIS — K31.89 GASTRIC DISTENTION: ICD-10-CM

## 2024-07-30 PROBLEM — K31.0 ACUTE DISTENTION OF STOMACH: Status: ACTIVE | Noted: 2024-07-30

## 2024-07-30 LAB
ALBUMIN SERPL-MCNC: 2.6 G/DL (ref 3.4–5)
ALBUMIN/GLOB SERPL: 0.5 {RATIO} (ref 1.1–2.2)
ALP SERPL-CCNC: 265 U/L (ref 40–129)
ALT SERPL-CCNC: 19 U/L (ref 10–40)
ANION GAP SERPL CALCULATED.3IONS-SCNC: 9 MMOL/L (ref 3–16)
AST SERPL-CCNC: 18 U/L (ref 15–37)
BASOPHILS # BLD: 0.1 K/UL (ref 0–0.2)
BASOPHILS NFR BLD: 0.8 %
BILIRUB SERPL-MCNC: 0.3 MG/DL (ref 0–1)
BUN SERPL-MCNC: 26 MG/DL (ref 7–20)
CALCIUM SERPL-MCNC: 8.3 MG/DL (ref 8.3–10.6)
CHLORIDE SERPL-SCNC: 100 MMOL/L (ref 99–110)
CO2 SERPL-SCNC: 29 MMOL/L (ref 21–32)
CREAT SERPL-MCNC: 2.3 MG/DL (ref 0.6–1.2)
DEPRECATED RDW RBC AUTO: 15.1 % (ref 12.4–15.4)
EKG ATRIAL RATE: 78 BPM
EKG DIAGNOSIS: NORMAL
EKG P AXIS: 77 DEGREES
EKG P-R INTERVAL: 150 MS
EKG Q-T INTERVAL: 390 MS
EKG QRS DURATION: 84 MS
EKG QTC CALCULATION (BAZETT): 444 MS
EKG R AXIS: -13 DEGREES
EKG T AXIS: 68 DEGREES
EKG VENTRICULAR RATE: 78 BPM
EOSINOPHIL # BLD: 0.1 K/UL (ref 0–0.6)
EOSINOPHIL NFR BLD: 0.5 %
EST. AVERAGE GLUCOSE BLD GHB EST-MCNC: 108.3 MG/DL
GFR SERPLBLD CREATININE-BSD FMLA CKD-EPI: 23 ML/MIN/{1.73_M2}
GLUCOSE BLD-MCNC: 147 MG/DL (ref 70–99)
GLUCOSE BLD-MCNC: 159 MG/DL (ref 70–99)
GLUCOSE BLD-MCNC: 73 MG/DL (ref 70–99)
GLUCOSE BLD-MCNC: 92 MG/DL (ref 70–99)
GLUCOSE SERPL-MCNC: 50 MG/DL (ref 70–99)
HBA1C MFR BLD: 5.4 %
HCT VFR BLD AUTO: 25.3 % (ref 36–48)
HGB BLD-MCNC: 8.1 G/DL (ref 12–16)
LIPASE SERPL-CCNC: 5 U/L (ref 13–60)
LYMPHOCYTES # BLD: 1.9 K/UL (ref 1–5.1)
LYMPHOCYTES NFR BLD: 17.4 %
MCH RBC QN AUTO: 29.3 PG (ref 26–34)
MCHC RBC AUTO-ENTMCNC: 32 G/DL (ref 31–36)
MCV RBC AUTO: 91.5 FL (ref 80–100)
MONOCYTES # BLD: 0.4 K/UL (ref 0–1.3)
MONOCYTES NFR BLD: 4 %
NEUTROPHILS # BLD: 8.4 K/UL (ref 1.7–7.7)
NEUTROPHILS NFR BLD: 77.3 %
PERFORMED ON: ABNORMAL
PERFORMED ON: ABNORMAL
PERFORMED ON: NORMAL
PERFORMED ON: NORMAL
PLATELET # BLD AUTO: 297 K/UL (ref 135–450)
PMV BLD AUTO: 8.8 FL (ref 5–10.5)
POTASSIUM SERPL-SCNC: 4 MMOL/L (ref 3.5–5.1)
PROT SERPL-MCNC: 7.8 G/DL (ref 6.4–8.2)
RBC # BLD AUTO: 2.76 M/UL (ref 4–5.2)
SODIUM SERPL-SCNC: 138 MMOL/L (ref 136–145)
TROPONIN, HIGH SENSITIVITY: 107 NG/L (ref 0–14)
WBC # BLD AUTO: 10.8 K/UL (ref 4–11)

## 2024-07-30 PROCEDURE — 6360000002 HC RX W HCPCS: Performed by: INTERNAL MEDICINE

## 2024-07-30 PROCEDURE — 2580000003 HC RX 258: Performed by: EMERGENCY MEDICINE

## 2024-07-30 PROCEDURE — 99254 IP/OBS CNSLTJ NEW/EST MOD 60: CPT | Performed by: SURGERY

## 2024-07-30 PROCEDURE — 84484 ASSAY OF TROPONIN QUANT: CPT

## 2024-07-30 PROCEDURE — 83690 ASSAY OF LIPASE: CPT

## 2024-07-30 PROCEDURE — 93005 ELECTROCARDIOGRAM TRACING: CPT | Performed by: PHYSICIAN ASSISTANT

## 2024-07-30 PROCEDURE — 2580000003 HC RX 258: Performed by: INTERNAL MEDICINE

## 2024-07-30 PROCEDURE — 99285 EMERGENCY DEPT VISIT HI MDM: CPT

## 2024-07-30 PROCEDURE — 83036 HEMOGLOBIN GLYCOSYLATED A1C: CPT

## 2024-07-30 PROCEDURE — 74018 RADEX ABDOMEN 1 VIEW: CPT

## 2024-07-30 PROCEDURE — 93010 ELECTROCARDIOGRAM REPORT: CPT | Performed by: INTERNAL MEDICINE

## 2024-07-30 PROCEDURE — 80053 COMPREHEN METABOLIC PANEL: CPT

## 2024-07-30 PROCEDURE — APPNB60 APP NON BILLABLE TIME 46-60 MINS: Performed by: CLINICAL NURSE SPECIALIST

## 2024-07-30 PROCEDURE — 36415 COLL VENOUS BLD VENIPUNCTURE: CPT

## 2024-07-30 PROCEDURE — 85025 COMPLETE CBC W/AUTO DIFF WBC: CPT

## 2024-07-30 PROCEDURE — 74176 CT ABD & PELVIS W/O CONTRAST: CPT

## 2024-07-30 PROCEDURE — 1200000000 HC SEMI PRIVATE

## 2024-07-30 PROCEDURE — 71045 X-RAY EXAM CHEST 1 VIEW: CPT

## 2024-07-30 PROCEDURE — 87493 C DIFF AMPLIFIED PROBE: CPT

## 2024-07-30 RX ORDER — ONDANSETRON 4 MG/1
4 TABLET, ORALLY DISINTEGRATING ORAL EVERY 8 HOURS PRN
Status: DISCONTINUED | OUTPATIENT
Start: 2024-07-30 | End: 2024-08-02 | Stop reason: HOSPADM

## 2024-07-30 RX ORDER — SODIUM CHLORIDE, SODIUM LACTATE, POTASSIUM CHLORIDE, CALCIUM CHLORIDE 600; 310; 30; 20 MG/100ML; MG/100ML; MG/100ML; MG/100ML
INJECTION, SOLUTION INTRAVENOUS CONTINUOUS
Status: DISCONTINUED | OUTPATIENT
Start: 2024-07-30 | End: 2024-08-02 | Stop reason: HOSPADM

## 2024-07-30 RX ORDER — SODIUM CHLORIDE 0.9 % (FLUSH) 0.9 %
5-40 SYRINGE (ML) INJECTION EVERY 12 HOURS SCHEDULED
Status: DISCONTINUED | OUTPATIENT
Start: 2024-07-30 | End: 2024-08-02 | Stop reason: HOSPADM

## 2024-07-30 RX ORDER — INSULIN LISPRO 100 [IU]/ML
0-4 INJECTION, SOLUTION INTRAVENOUS; SUBCUTANEOUS NIGHTLY
Status: DISCONTINUED | OUTPATIENT
Start: 2024-07-30 | End: 2024-08-02 | Stop reason: HOSPADM

## 2024-07-30 RX ORDER — ONDANSETRON 2 MG/ML
4 INJECTION INTRAMUSCULAR; INTRAVENOUS EVERY 6 HOURS PRN
Status: DISCONTINUED | OUTPATIENT
Start: 2024-07-30 | End: 2024-08-02 | Stop reason: HOSPADM

## 2024-07-30 RX ORDER — SODIUM CHLORIDE 9 MG/ML
INJECTION, SOLUTION INTRAVENOUS PRN
Status: DISCONTINUED | OUTPATIENT
Start: 2024-07-30 | End: 2024-08-02 | Stop reason: HOSPADM

## 2024-07-30 RX ORDER — GLUCAGON 1 MG/ML
1 KIT INJECTION PRN
Status: DISCONTINUED | OUTPATIENT
Start: 2024-07-30 | End: 2024-08-02 | Stop reason: HOSPADM

## 2024-07-30 RX ORDER — 0.9 % SODIUM CHLORIDE 0.9 %
1000 INTRAVENOUS SOLUTION INTRAVENOUS ONCE
Status: DISCONTINUED | OUTPATIENT
Start: 2024-07-30 | End: 2024-08-02 | Stop reason: HOSPADM

## 2024-07-30 RX ORDER — LOPERAMIDE HYDROCHLORIDE 2 MG/1
2 CAPSULE ORAL 4 TIMES DAILY PRN
Status: ON HOLD | COMMUNITY

## 2024-07-30 RX ORDER — DEXTROSE MONOHYDRATE 100 MG/ML
1000 INJECTION, SOLUTION INTRAVENOUS CONTINUOUS
Status: DISCONTINUED | OUTPATIENT
Start: 2024-07-30 | End: 2024-08-02

## 2024-07-30 RX ORDER — MAGNESIUM HYDROXIDE/ALUMINUM HYDROXICE/SIMETHICONE 120; 1200; 1200 MG/30ML; MG/30ML; MG/30ML
30 SUSPENSION ORAL EVERY 6 HOURS PRN
Status: DISCONTINUED | OUTPATIENT
Start: 2024-07-30 | End: 2024-08-02 | Stop reason: HOSPADM

## 2024-07-30 RX ORDER — INSULIN LISPRO 100 [IU]/ML
0-4 INJECTION, SOLUTION INTRAVENOUS; SUBCUTANEOUS
Status: DISCONTINUED | OUTPATIENT
Start: 2024-07-30 | End: 2024-08-02 | Stop reason: HOSPADM

## 2024-07-30 RX ORDER — SODIUM CHLORIDE 0.9 % (FLUSH) 0.9 %
5-40 SYRINGE (ML) INJECTION PRN
Status: DISCONTINUED | OUTPATIENT
Start: 2024-07-30 | End: 2024-08-02 | Stop reason: HOSPADM

## 2024-07-30 RX ORDER — SENNOSIDES A AND B 8.6 MG/1
1 TABLET, FILM COATED ORAL DAILY PRN
Status: DISCONTINUED | OUTPATIENT
Start: 2024-07-30 | End: 2024-08-02 | Stop reason: HOSPADM

## 2024-07-30 RX ORDER — OXYCODONE HYDROCHLORIDE AND ACETAMINOPHEN 5; 325 MG/1; MG/1
1 TABLET ORAL EVERY 8 HOURS PRN
Status: ON HOLD | COMMUNITY

## 2024-07-30 RX ORDER — ENOXAPARIN SODIUM 100 MG/ML
30 INJECTION SUBCUTANEOUS DAILY
Status: DISCONTINUED | OUTPATIENT
Start: 2024-07-30 | End: 2024-08-02 | Stop reason: HOSPADM

## 2024-07-30 RX ORDER — ACETAMINOPHEN 325 MG/1
650 TABLET ORAL EVERY 6 HOURS PRN
Status: ON HOLD | COMMUNITY

## 2024-07-30 RX ORDER — ACETAMINOPHEN 650 MG/1
650 SUPPOSITORY RECTAL EVERY 6 HOURS PRN
Status: DISCONTINUED | OUTPATIENT
Start: 2024-07-30 | End: 2024-08-02 | Stop reason: HOSPADM

## 2024-07-30 RX ORDER — DEXTROSE MONOHYDRATE 100 MG/ML
INJECTION, SOLUTION INTRAVENOUS CONTINUOUS PRN
Status: DISCONTINUED | OUTPATIENT
Start: 2024-07-30 | End: 2024-08-02 | Stop reason: HOSPADM

## 2024-07-30 RX ORDER — ACETAMINOPHEN 325 MG/1
650 TABLET ORAL EVERY 6 HOURS PRN
Status: DISCONTINUED | OUTPATIENT
Start: 2024-07-30 | End: 2024-08-02 | Stop reason: HOSPADM

## 2024-07-30 RX ADMIN — SODIUM CHLORIDE, POTASSIUM CHLORIDE, SODIUM LACTATE AND CALCIUM CHLORIDE: 600; 310; 30; 20 INJECTION, SOLUTION INTRAVENOUS at 23:30

## 2024-07-30 RX ADMIN — SODIUM CHLORIDE, POTASSIUM CHLORIDE, SODIUM LACTATE AND CALCIUM CHLORIDE: 600; 310; 30; 20 INJECTION, SOLUTION INTRAVENOUS at 15:27

## 2024-07-30 RX ADMIN — HYDROMORPHONE HYDROCHLORIDE 0.5 MG: 1 INJECTION, SOLUTION INTRAMUSCULAR; INTRAVENOUS; SUBCUTANEOUS at 15:32

## 2024-07-30 RX ADMIN — HYDROMORPHONE HYDROCHLORIDE 0.5 MG: 1 INJECTION, SOLUTION INTRAMUSCULAR; INTRAVENOUS; SUBCUTANEOUS at 18:47

## 2024-07-30 RX ADMIN — DEXTROSE MONOHYDRATE 250 ML: 100 INJECTION, SOLUTION INTRAVENOUS at 11:34

## 2024-07-30 ASSESSMENT — PAIN SCALES - GENERAL
PAINLEVEL_OUTOF10: 7

## 2024-07-30 ASSESSMENT — PAIN - FUNCTIONAL ASSESSMENT: PAIN_FUNCTIONAL_ASSESSMENT: NONE - DENIES PAIN

## 2024-07-30 ASSESSMENT — PAIN DESCRIPTION - LOCATION: LOCATION: ABDOMEN;BACK

## 2024-07-30 NOTE — PROGRESS NOTES
Pt admitted from ED to R335. A&Ox4. VSS. Pt oriented to room and asked to call for assistance. Verbalizes understanding. Admission completed. NG to ILWS with yellow output. Dilaudid given for pain per orders. Pt's cash 433 dollars given to security for safekeeping. Pt will get it back upon discharge. Call light within reach, bed in lowest position, wheels locked. Bed alarm on and audible.

## 2024-07-30 NOTE — H&P
disorder (HCC) 10/18/2008    CAD (coronary artery disease)     stent placed 6/8/20    Cancer ([facility]) 2015    bilateral breast:s/p lumpectomy/radiation:under care care of breast specialist:Dr. Boone     Carotid stenosis, bilateral:<50%:per US 7/2016 7/15/2016    Carpal tunnel syndrome 10/18/2008    Cervical cancer screening 2014    Nml per pt'.    Coronary artery disease of native artery of native heart with stable angina pectoris ([facility]) 6/9/2020    DDD (degenerative disc disease), lumbar 7/18/2018    Depression     under care of pschiatrist:Dr. Rojas    Depression/anxiety 7/5/2017    Depression/anxiety     Diabetes mellitus (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) 9/17/2017    Microalbuminuria 7/1/2016    Neuropathy in diabetes ([facility])     Non morbid obesity 7/1/2016    Pancreatitis 5/12/16    A hospitalization 5/12/16-5/16/16:under care of GI:chronic pancreatitis    S/P endoscopy 6/14/2016    [facility]:per pt' & her family member was nml.    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 myelopathy or radiculopathy 3/10/2017    Transient cerebral ischemia 07/15/2016    TIA:7/10/16    Unspecified cerebral artery occlusion with cerebral infarction     TIA       Past Surgical History:          Procedure Laterality Date    BREAST LUMPECTOMY  2015    Bilateral:breast cancer    CARDIAC CATHETERIZATION  06/08/2020    Dr. Marcano([facility]), DAYANA to Diag 1    COLONOSCOPY N/A 2/1/2021    COLONOSCOPY DIAGNOSTIC performed by Silviano Pro MD at [facility] ENDOSCOPY    COLONOSCOPY N/A 2/27/2023    COLONOSCOPY WITH BIOPSY performed by Silviano Pro MD at [facility] ENDOSCOPY    COLONOSCOPY N/A 4/25/2024    COLONOSCOPY DIAGNOSTIC performed by Damian Riley DO at [facility] ENDOSCOPY    CT BONE MARROW      Recent Labs     07/30/24  1003   WBC 10.8   HGB 8.1*   HCT 25.3*        Recent Labs     07/30/24  1003      K 4.0      CO2 29   BUN 26*   CREATININE 2.3*   CALCIUM 8.3     Recent Labs     07/30/24  1003   AST 18   ALT 19   BILITOT 0.3   ALKPHOS 265*     No results for input(s): \"INR\" in the last 72 hours.  Recent Labs     07/30/24  1003 07/30/24  1217   TROPHS 115* 107*       Urinalysis:      Lab Results   Component Value Date/Time    NITRU Negative 07/01/2024 11:04 PM    WBCUA 21-50 07/01/2024 11:04 PM    BACTERIA Rare 07/01/2024 11:04 PM    RBCUA 11-20 07/01/2024 11:04 PM    BLOODU MODERATE 07/01/2024 11:04 PM    SPECGRAV 1.010 01/06/2013 12:11 AM    GLUCOSEU >=1000 07/01/2024 11:04 PM    GLUCOSEU >=1000 mg/dL 06/07/2010 03:38 PM       Radiology:     CXR: I have reviewed the CXR.  EKG:  I have reviewed the EKG.     XR ABDOMEN (KUB) (SINGLE AP VIEW)   Final Result   Enteric tube is positioned in the stomach.  Distal tip is below field of view   of this exam.         CT ABDOMEN PELVIS WO CONTRAST Additional Contrast? None   Final Result   Moderate ileus with severe gastric distension.         XR CHEST PORTABLE   Final Result   Hazy opacity at the lung bases, either atelectasis or pneumonia.             Consults:    IP CONSULT TO GENERAL SURGERY      ASSESSMENT:    Active Hospital Problems    Diagnosis Date Noted    Acute distention of stomach [K31.0] 07/30/2024     Abdominal pain, nausea and vomiting  Severe gastric distention  Ileus  COVID-19 infection  Diabetes mellitus type 2 poorly controlled  Hypertension is hyperlipidemia  GERD  Chronic anemia  Acute on chronic kidney disease, CKD stage IIIb  Chronic diastolic heart failure  Chronic normocytic anemia  COVID-19 infection with no upper respiratory symptoms    PLAN:    Admit to medical surgical floor with telemetry    24-hour cardiac monitoring  Telemetry  COVID-19 isolation protocol    General surgery consult recommendations as  follows:  Due to severe gastric distention of unclear etiology patient is to be admitted  NG tube insertion  Patient will be kept n.p.o.  IV fluid hydration after IV fluid boluses will be maintained  GI consult for further workup and upper GI endoscopy if indicated    Appreciate general surgery input    Electrolytes monitoring replacement as needed  Pain management as needed  Resume significant home meds  Monitoring I's and O's      Elevated troponin level  Chronically elevated troponin in the range of 67-80s-  Initial troponin level 115  Repeat troponin 107  Continue trending high-sensitivity troponin level  Twelve-lead EKG as needed  Chest pain protocol  Nitroglycerin sublingual as needed for chest pain  Resume aspirin, statin, beta-blockers    Morphine IV for pain management      O2 supplement therapy via nasal cannula to keep saturation above 92%  Respiratory protocol  Duo nebs  Incentive spirometry    Hypertension  Resume home dose antihypertensives  Since patient is n.p.o. we will continue with IV hydralazine as needed for uncontrolled hypertension  Continue vital signs monitoring    Hyperlipidemia  Resume home dose of statins after patient's n.p.o. status is changed    Diabetes mellitus type 2  Fingerstick monitoring before every meal and at bedtime  Sliding scale regular insulin coverage  Hemoglobin A1c  Diabetic diet  Diabetic management team consult      CKD stage IIIb  IV fluid hydration  Avoid nephrotoxic drugs  Avoid hypotension, IV contrast, NSAIDs,  Repeat BMP a.m. labs  Renal dosing of medications    Increase ambulation with assistance as tolerated  PT OT evaluation for improvement of functional level and discharge planning    DVT Prophylaxis: Heparin subcu  Diet: Diet NPO  Code Status: Full Code    Dispo -pending upon clinical improvement  I reviewed and evaluated all imaging studies and test results discussed with patient and ED physician.  They agree with current management.  Further plan of care

## 2024-07-30 NOTE — PROGRESS NOTES
4 Eyes Skin Assessment     The patient is being assess for  Admission    I agree that 2 RN's have performed a thorough Head to Toe Skin Assessment on the patient. ALL assessment sites listed below have been assessed.       Areas assessed by both nurses:   [x]   Head, Face, and Ears   [x]   Shoulders, Back, and Chest  [x]   Arms, Elbows, and Hands   [x]   Coccyx, Sacrum, and IschIum  [x]   Legs, Feet, and Heels        Does the Patient have Skin Breakdown?  Old healed wound coccyx- mepilex on         Maco Prevention initiated:  Yes   Wound Care Orders initiated:  No      WOC nurse consulted for Pressure Injury (Stage 3,4, Unstageable, DTI, NWPT, and Complex wounds), New and Established Ostomies:  No      Nurse 1 eSignature: Electronically signed by Lynda Mishra RN on 7/30/24 at 4:21 PM EDT    **SHARE this note so that the co-signing nurse is able to place an eSignature**    Nurse 2 eSignature: Electronically signed by Mary Jane Blevins RN on 7/30/24 at 7:10 PM EDT

## 2024-07-30 NOTE — ED NOTES
Patient Name: Agustina Fried  :  1960  64 y.o.  MRN:  [id number]  Preferred Name  agustina  ED Room #:    Family/Caregiver Present no  Restraints no  Sitter no  Sepsis Risk Score      Situation  Code Status: Full Code No additional code details.    Allergies: Morphine, Penicillins, and Codeine  Weight: Patient Vitals for the past 96 hrs (Last 3 readings):   Weight   24 0950 59 kg (130 lb)     Arrived from: nursing home  Chief Complaint:   Chief Complaint   Patient presents with    Diarrhea     Patient in from [facility] [place], operated by [facility] for nausea, emesis, and diarrhea for several days. Patient denies any complaints but says she says she's been having rectal bleeding     Hospital Problem/Diagnosis:  Principal Problem:    Acute distention of stomach  Resolved Problems:    * No resolved hospital problems. *    Imaging:   XR ABDOMEN (KUB) (SINGLE AP VIEW)   Final Result   Enteric tube is positioned in the stomach.  Distal tip is below field of view   of this exam.         CT ABDOMEN PELVIS WO CONTRAST Additional Contrast? None   Final Result   Moderate ileus with severe gastric distension.         XR CHEST PORTABLE   Final Result   Hazy opacity at the lung bases, either atelectasis or pneumonia.           Abnormal labs:   Abnormal Labs Reviewed   CBC WITH AUTO DIFFERENTIAL - Abnormal; Notable for the following components:       Result Value    RBC 2.76 (*)     Hemoglobin 8.1 (*)     Hematocrit 25.3 (*)     Neutrophils Absolute 8.4 (*)     All other components within normal limits   COMPREHENSIVE METABOLIC PANEL W/ REFLEX TO MG FOR LOW K - Abnormal; Notable for the following components:    Glucose 50 (*)     BUN 26 (*)     Creatinine 2.3 (*)     Est, Glom Filt Rate 23 (*)     Albumin 2.6 (*)     Albumin/Globulin Ratio 0.5 (*)     Alkaline Phosphatase 265 (*)     All other components within normal limits   TROPONIN - Abnormal; Notable for the following components:    Troponin, High Sensitivity 115 (*)     All other  components within normal limits   LIPASE - Abnormal; Notable for the following components:    Lipase 5.0 (*)     All other components within normal limits   TROPONIN - Abnormal; Notable for the following components:    Troponin, High Sensitivity 107 (*)     All other components within normal limits     Critical values: no  Intervention for critical value(s): low bld glucose, rebound with d10 drip      Abnormal Assessment Findings: see notes    Background  History:   Past Medical History:   Diagnosis Date    Abnormal brain MRI 7/20/2017    Partially empty sella and minimal chronic small vessel ischemic disease    Acute bilateral low back pain without sciatica 11/2/2016    SHARRON (acute kidney injury) ([facility]) 7/5/2017    Arthritis     back    Bipolar disorder ([facility]) 10/18/2008    CAD (coronary artery disease)     stent placed 6/8/20    Cancer ([facility]) 2015    bilateral breast:s/p lumpectomy/radiation:under care care of breast specialist:Dr. Boone     Carotid stenosis, bilateral:<50%:per US 7/2016 7/15/2016    Carpal tunnel syndrome 10/18/2008    Cervical cancer screening 2014    Nml per pt'.    Coronary artery disease of native artery of native heart with stable angina pectoris ([facility]) 6/9/2020    DDD (degenerative disc disease), lumbar 7/18/2018    Depression     under care of pschiatrist:Dr. 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    Scoliosis      Spondylosis of lumbar region without myelopathy or radiculopathy 3/10/2017    Transient cerebral ischemia 07/15/2016    TIA:7/10/16    Unspecified cerebral artery occlusion with cerebral infarction     TIA       Assessment    Vitals/MEWS:    Level of Consciousness: Alert (0)   Vitals:    07/30/24 0950 07/30/24 0952 07/30/24 1219   BP:  130/62 (!) 127/50   Pulse:  81 79   Resp:  16 16   Temp:  98.8 °F (37.1 °C)    SpO2:  97% 94%   Weight: 59 kg (130 lb)     Height: 1.6 m (5' 3\")       FiO2 (%): na  O2 Flow Rate: O2 Device: None (Room air)    Cardiac Rhythm:    Pain Assessment: 0 [ ] Verbal [ ] Arguelles Caicedo Scale  Pain Scale: Pain Assessment  Pain Assessment: None - Denies Pain  Last documented pain score (0-10 scale)    Last documented pain medication administered: na  Mental Status: oriented, coherent, logical, thought processes intact, and able to concentrate and follow conversation  NIH Score:    C-SSRS: Risk of Suicide: No Risk  Bedside swallow:    Alberto Coma Scale (GCS): Alberto Coma Scale  Eye Opening: Spontaneous  Best Verbal Response: Oriented  Best Motor Response: Obeys commands  [place] Coma Scale Score: 15  Active LDA's:   Peripheral IV 07/30/24 Proximal;Right Forearm (Active)     PO Status: Nothing by Mouth  Pertinent or High Risk Medications/Drips: no  oIf Yes, please provide details: na  Pending Blood Product Administration: no    You may also review the ED PT Care Timeline found under the Summary Nursing Index tab.    Recommendation    Pending orders floor orders placed  Plan for Discharge (if known):  Baseline ambulation: Walker.  Voiding: External catheter.  Belongings documented: no    Additional Comments: pt ng in place 400 ml fluid removed. Ileus found on ct.. watch blood glucose. Was 50 on arrival, rebounded with d10% drip  If any further questions, please call Sending RN at [phone]    Electronically signed by: Electronically signed by Santi Ye RN on 7/30/2024 at 1:59 PM

## 2024-07-30 NOTE — ED PROVIDER NOTES
[facility]  ED     EMERGENCY DEPARTMENT ENCOUNTER     Location: [facility]  ED  7/30/2024  Note Started: 2:41 PM EDT 7/30/24      Patient Identification  Agustina Fried is a 64 y.o. female      HPI:Agustina Fried was evaluated in the Emergency Department for patient is here because of vomiting and diarrhea for the last few days at her nursing home.  She is quite sleepy on my evaluation but denies any other complaints.. Although initial history and physical exam information was obtained by BETTY/NPP/MD/DO (who also dictated a record of this visit), I personally saw the patient and performed and made/approved the management plan and take responsibility for the patient management.      PHYSICAL EXAM:  Heart regular rate and rhythm and abdomen benign.    EKG Interpreted by me  Normal sinus rhythm with a rate of 78, no ischemic findings, normal intervals, 2 PACs noted.  No significant change from EKG dated 6 July 2024    Patient seen and evaluated.  Relevant records reviewed.  MDM     Labs and imaging significant for an ileus on CT scan as well as significant dehydration and hypoglycemia clinically.  Patient is being treated for all of these things.  We are giving her fluids and some D10.  We also placed an NG tube for decompression of the stomach.  I believe patient will benefit from hospitalization for further treatment of all these conditions.  She is stable for admission in my opinion.    CLINICAL IMPRESSION  1. Ileus (HCC)    2. Hypoglycemia    3. Dehydration    4. Nausea vomiting and diarrhea    5. Gastric distention          IBrendon MD, am the primary clinician of record.   I personally saw the patient and independently provided 40 minutes of non-concurrent critical care out of the total shared critical care time excluding separately billable procedures.    This chart was generated in part by using Dragon Dictation system and may contain errors related to that system

## 2024-07-30 NOTE — CONSULTS
Department of General Surgery Consult    PATIENT NAME: Agustina Fried   YOB: 1960    ADMISSION DATE: 7/30/2024  9:45 AM      TODAY'S DATE: 7/30/2024    Reason for Consult:  gastric distention, ileus    Chief Complaint: diarrhea, abd pain.     Historian: patient, RME    Requesting Practitioner:  Alvaro    HISTORY OF PRESENT ILLNESS:              The patient is a 64 y.o. female who presented from SNF with profuse diarrhea over several days and complaints of abd pain. The pt also says she was having some rectal bleeding. She denies vomiting, but does state her stomach felt swollen. Currently, the pt is complaining of ngt discomfort.   CT scab revealed marked gastric distention and signs of ileus.     Past Medical History:        Diagnosis Date    Abnormal brain MRI 7/20/2017    Partially empty sella and minimal chronic small vessel ischemic disease    Acute bilateral low back pain without sciatica 11/2/2016    SHARRON (acute kidney injury) ([facility]) 7/5/2017    Arthritis     back    Bipolar disorder ([facility]) 10/18/2008    CAD (coronary artery disease)     stent placed 6/8/20    Cancer ([facility]) 2015    bilateral breast:s/p lumpectomy/radiation:under care care of breast specialist:Dr. Boone     Carotid stenosis, bilateral:<50%:per US 7/2016 7/15/2016    Carpal tunnel syndrome 10/18/2008    Cervical cancer screening 2014    Nml per pt'.    Coronary artery disease of native artery of native heart with stable angina pectoris ([facility]) 6/9/2020    DDD (degenerative disc disease), lumbar 7/18/2018    Depression     under care of pschiatrist:Dr. 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  The right kidney is surgically absent.     GI/Bowel: There is severe distension of the stomach with extensive retained  ingested material.  There is moderate fluid-filled/gaseous distention of both  large and small bowel throughout the abdomen and pelvis.  There is no obvious  bowel wall thickening or     Pelvis: The uterus is surgically absent.  The bladder is grossly unremarkable.     Peritoneum/Retroperitoneum: There is no free air, free fluid or  intraperitoneal inflammatory change.  There is no adenopathy.     Bones/Soft Tissues: There is no acute fracture or aggressive osseous lesion.     IMPRESSION:  Moderate ileus with severe gastric distension.      IMPRESSION/RECOMMENDATIONS:    Severe gastric distention of uncertain etiology:     Ngt inserted, NPO, IVF.     Recommend GI consult for further workup, ie UGI and endoscopy.     Electronically signed by SARTHAK Landrum - CNP     [facility]  [id number]

## 2024-07-30 NOTE — ANESTHESIA POSTPROCEDURE EVALUATION
Department of Anesthesiology  Postprocedure Note    Patient: Amilcar Snow  MRN: [id number]  YOB: 1960  Date of evaluation: 2/27/2023      Procedure Summary     Date: 02/27/23 Room / Location: [facility] ROOM / [facility]    Anesthesia Start: 6286 Anesthesia Stop: 6251    Procedure: COLONOSCOPY WITH BIOPSY Diagnosis:       Screening for colon cancer      (Screening for colon cancer [Z12.11])    Surgeons: Monie Avelar MD Responsible Provider: Jovany Singh MD    Anesthesia Type: MAC ASA Status: 4          Anesthesia Type: No value filed.     Marnie Phase I:      Marnie Phase II:        Anesthesia Post Evaluation    Comments: Postoperative Anesthesia Note    Name:    Amilcar Snow  MRN:      [id number]    Patient Vitals in the past 12 hrs:  02/27/23 1547, Temp:98 °F (36.7 °C), Temp src:Oral  02/27/23 1400, BP:136/64, Pulse:68  02/27/23 1300, BP:(!) 124/51, Pulse:69  02/27/23 1202, BP:139/62, Pulse:69, Resp:(!) 9, SpO2:100 %  02/27/23 1200, BP:139/62, Pulse:67, Resp:14  02/27/23 1156, Temp:98.4 °F (36.9 °C), Temp src:Oral  02/27/23 1136, Resp:14  02/27/23 1000, BP:(!) 128/54, Pulse:60, Resp:14, SpO2:99 %  02/27/23 0900, BP:(!) 147/58, Pulse:57, Resp:14, SpO2:100 %  02/27/23 0820, Temp:97.9 °F (36.6 °C), Temp src:Oral  02/27/23 0800, BP:(!) 161/67, Pulse:59, Resp:14, SpO2:100 %  02/27/23 0700, BP:(!) 169/62, Pulse:60, Resp:18  02/27/23 0600, BP:(!) 149/64, Pulse:64, Resp:11, SpO2:100 %  02/27/23 0500, BP:(!) 149/59, Pulse:62, Resp:12, SpO2:99 %     LABS:    CBC  Lab Results       Component                Value               Date/Time                  WBC                      9.3                 02/26/2023 07:30 PM        HGB                      10.1 (L)            02/26/2023 07:30 PM        HCT                      33.0 (L)            02/26/2023 07:30 PM        PLT                      161                 02/26/2023 07:30 PM   RENAL  Lab Results       Component                Value Date/Time                  NA                       139                 02/27/2023 07:10 AM        K                        3.9                 02/27/2023 07:10 AM        K                        3.6                 02/26/2023 07:30 PM        CL                       106                 02/27/2023 07:10 AM        CO2                      23                  02/27/2023 07:10 AM        BUN                      13                  02/27/2023 07:10 AM        CREATININE               0.9                 02/27/2023 07:10 AM        GLUCOSE                  144 (H)             02/27/2023 07:10 AM   COAGS  Lab Results       Component                Value               Date/Time                  PROTIME                  12.8                12/14/2022 05:12 AM        INR                      0.97                12/14/2022 05:12 AM        APTT                     24.8                12/11/2022 04:45 AM     Intake & Output: In: 450 (I.V.:450)  Out: -     Nausea & Vomiting:  No    Level of Consciousness:  Awake    Pain Assessment:  Adequate analgesia    Anesthesia Complications:  No apparent anesthetic complications    SUMMARY      Vital signs stable  OK to discharge from Stage I post anesthesia care.   Care transferred from Anesthesiology department on discharge from perioperative area [de-identified] : 40-year-old woman presenting for evaluation of incidental findings of gallstones on an outpatient abdominal ultrasound.  The patient was having lower abdominal discomfort and was sent for complete abdominal ultrasonography.  Findings demonstrating no abnormalities of her uterus and ovaries but incidentally found gallstones.  The patient denies any symptoms of biliary colic.

## 2024-07-31 ENCOUNTER — APPOINTMENT (OUTPATIENT)
Dept: CT IMAGING | Age: 64
DRG: 247 | End: 2024-07-31
Payer: MEDICAID

## 2024-07-31 ENCOUNTER — ANESTHESIA (OUTPATIENT)
Dept: ENDOSCOPY | Age: 64
End: 2024-07-31
Payer: MEDICAID

## 2024-07-31 ENCOUNTER — ANESTHESIA EVENT (OUTPATIENT)
Dept: ENDOSCOPY | Age: 64
End: 2024-07-31
Payer: MEDICAID

## 2024-07-31 LAB
ABO + RH BLD: NORMAL
ANION GAP SERPL CALCULATED.3IONS-SCNC: 11 MMOL/L (ref 3–16)
BACTERIA URNS QL MICRO: ABNORMAL /HPF
BASOPHILS # BLD: 0.1 K/UL (ref 0–0.2)
BASOPHILS NFR BLD: 0.7 %
BILIRUB UR QL STRIP.AUTO: NEGATIVE
BLD GP AB SCN SERPL QL: NORMAL
BLOOD BANK DISPENSE STATUS: NORMAL
BLOOD BANK PRODUCT CODE: NORMAL
BPU ID: NORMAL
BUN SERPL-MCNC: 22 MG/DL (ref 7–20)
CALCIUM SERPL-MCNC: 7.7 MG/DL (ref 8.3–10.6)
CHLORIDE SERPL-SCNC: 102 MMOL/L (ref 99–110)
CLARITY UR: ABNORMAL
CO2 SERPL-SCNC: 25 MMOL/L (ref 21–32)
COLOR UR: YELLOW
CREAT SERPL-MCNC: 1.6 MG/DL (ref 0.6–1.2)
DEPRECATED RDW RBC AUTO: 14.9 % (ref 12.4–15.4)
DESCRIPTION BLOOD BANK: NORMAL
EOSINOPHIL # BLD: 0 K/UL (ref 0–0.6)
EOSINOPHIL NFR BLD: 0.4 %
EPI CELLS #/AREA URNS HPF: ABNORMAL /HPF (ref 0–5)
GFR SERPLBLD CREATININE-BSD FMLA CKD-EPI: 35 ML/MIN/{1.73_M2}
GLUCOSE BLD-MCNC: 112 MG/DL (ref 70–99)
GLUCOSE BLD-MCNC: 115 MG/DL (ref 70–99)
GLUCOSE BLD-MCNC: 124 MG/DL (ref 70–99)
GLUCOSE BLD-MCNC: 137 MG/DL (ref 70–99)
GLUCOSE BLD-MCNC: 137 MG/DL (ref 70–99)
GLUCOSE SERPL-MCNC: 115 MG/DL (ref 70–99)
GLUCOSE UR STRIP.AUTO-MCNC: NEGATIVE MG/DL
HCT VFR BLD AUTO: 21.3 % (ref 36–48)
HCT VFR BLD AUTO: 30.5 % (ref 36–48)
HGB BLD-MCNC: 6.9 G/DL (ref 12–16)
HGB BLD-MCNC: 9.9 G/DL (ref 12–16)
HGB UR QL STRIP.AUTO: ABNORMAL
KETONES UR STRIP.AUTO-MCNC: NEGATIVE MG/DL
LACTATE BLDV-SCNC: 3.5 MMOL/L (ref 0.4–2)
LEUKOCYTE ESTERASE UR QL STRIP.AUTO: ABNORMAL
LYMPHOCYTES # BLD: 1.8 K/UL (ref 1–5.1)
LYMPHOCYTES NFR BLD: 21.2 %
MCH RBC QN AUTO: 29.8 PG (ref 26–34)
MCHC RBC AUTO-ENTMCNC: 32.3 G/DL (ref 31–36)
MCV RBC AUTO: 92.3 FL (ref 80–100)
MONOCYTES # BLD: 0.4 K/UL (ref 0–1.3)
MONOCYTES NFR BLD: 5 %
NEUTROPHILS # BLD: 6.3 K/UL (ref 1.7–7.7)
NEUTROPHILS NFR BLD: 72.7 %
NITRITE UR QL STRIP.AUTO: NEGATIVE
PERFORMED ON: ABNORMAL
PH UR STRIP.AUTO: 6 [PH] (ref 5–8)
PLATELET # BLD AUTO: 251 K/UL (ref 135–450)
PMV BLD AUTO: 9.1 FL (ref 5–10.5)
POTASSIUM SERPL-SCNC: 4.2 MMOL/L (ref 3.5–5.1)
PROT UR STRIP.AUTO-MCNC: 100 MG/DL
RBC # BLD AUTO: 2.31 M/UL (ref 4–5.2)
RBC #/AREA URNS HPF: ABNORMAL /HPF (ref 0–4)
SODIUM SERPL-SCNC: 138 MMOL/L (ref 136–145)
SP GR UR STRIP.AUTO: 1.02 (ref 1–1.03)
UA COMPLETE W REFLEX CULTURE PNL UR: YES
UA DIPSTICK W REFLEX MICRO PNL UR: YES
URN SPEC COLLECT METH UR: ABNORMAL
UROBILINOGEN UR STRIP-ACNC: 0.2 E.U./DL
WBC # BLD AUTO: 8.6 K/UL (ref 4–11)
WBC #/AREA URNS HPF: ABNORMAL /HPF (ref 0–5)

## 2024-07-31 PROCEDURE — 1200000000 HC SEMI PRIVATE

## 2024-07-31 PROCEDURE — 36415 COLL VENOUS BLD VENIPUNCTURE: CPT

## 2024-07-31 PROCEDURE — 85014 HEMATOCRIT: CPT

## 2024-07-31 PROCEDURE — 51798 US URINE CAPACITY MEASURE: CPT

## 2024-07-31 PROCEDURE — 85025 COMPLETE CBC W/AUTO DIFF WBC: CPT

## 2024-07-31 PROCEDURE — 6360000002 HC RX W HCPCS: Performed by: NURSE ANESTHETIST, CERTIFIED REGISTERED

## 2024-07-31 PROCEDURE — 86901 BLOOD TYPING SEROLOGIC RH(D): CPT

## 2024-07-31 PROCEDURE — 71250 CT THORAX DX C-: CPT

## 2024-07-31 PROCEDURE — 86923 COMPATIBILITY TEST ELECTRIC: CPT

## 2024-07-31 PROCEDURE — 3700000001 HC ADD 15 MINUTES (ANESTHESIA): Performed by: INTERNAL MEDICINE

## 2024-07-31 PROCEDURE — 2580000003 HC RX 258: Performed by: NURSE ANESTHETIST, CERTIFIED REGISTERED

## 2024-07-31 PROCEDURE — 7100000010 HC PHASE II RECOVERY - FIRST 15 MIN: Performed by: INTERNAL MEDICINE

## 2024-07-31 PROCEDURE — 81001 URINALYSIS AUTO W/SCOPE: CPT

## 2024-07-31 PROCEDURE — 6360000002 HC RX W HCPCS: Performed by: INTERNAL MEDICINE

## 2024-07-31 PROCEDURE — 3609012400 HC EGD TRANSORAL BIOPSY SINGLE/MULTIPLE: Performed by: INTERNAL MEDICINE

## 2024-07-31 PROCEDURE — 7100000011 HC PHASE II RECOVERY - ADDTL 15 MIN: Performed by: INTERNAL MEDICINE

## 2024-07-31 PROCEDURE — 30233N1 TRANSFUSION OF NONAUTOLOGOUS RED BLOOD CELLS INTO PERIPHERAL VEIN, PERCUTANEOUS APPROACH: ICD-10-PCS | Performed by: INTERNAL MEDICINE

## 2024-07-31 PROCEDURE — APPNB45 APP NON BILLABLE 31-45 MINUTES: Performed by: CLINICAL NURSE SPECIALIST

## 2024-07-31 PROCEDURE — 3700000000 HC ANESTHESIA ATTENDED CARE: Performed by: INTERNAL MEDICINE

## 2024-07-31 PROCEDURE — 83605 ASSAY OF LACTIC ACID: CPT

## 2024-07-31 PROCEDURE — 88305 TISSUE EXAM BY PATHOLOGIST: CPT

## 2024-07-31 PROCEDURE — 80048 BASIC METABOLIC PNL TOTAL CA: CPT

## 2024-07-31 PROCEDURE — P9016 RBC LEUKOCYTES REDUCED: HCPCS

## 2024-07-31 PROCEDURE — 87086 URINE CULTURE/COLONY COUNT: CPT

## 2024-07-31 PROCEDURE — 87186 SC STD MICRODIL/AGAR DIL: CPT

## 2024-07-31 PROCEDURE — 86850 RBC ANTIBODY SCREEN: CPT

## 2024-07-31 PROCEDURE — 2709999900 HC NON-CHARGEABLE SUPPLY: Performed by: INTERNAL MEDICINE

## 2024-07-31 PROCEDURE — 87088 URINE BACTERIA CULTURE: CPT

## 2024-07-31 PROCEDURE — 86900 BLOOD TYPING SEROLOGIC ABO: CPT

## 2024-07-31 PROCEDURE — 51701 INSERT BLADDER CATHETER: CPT

## 2024-07-31 PROCEDURE — 85018 HEMOGLOBIN: CPT

## 2024-07-31 PROCEDURE — 36430 TRANSFUSION BLD/BLD COMPNT: CPT

## 2024-07-31 PROCEDURE — 0DB98ZX EXCISION OF DUODENUM, VIA NATURAL OR ARTIFICIAL OPENING ENDOSCOPIC, DIAGNOSTIC: ICD-10-PCS | Performed by: INTERNAL MEDICINE

## 2024-07-31 RX ORDER — PROCHLORPERAZINE EDISYLATE 5 MG/ML
5 INJECTION INTRAMUSCULAR; INTRAVENOUS
Status: CANCELLED | OUTPATIENT
Start: 2024-07-31 | End: 2024-08-01

## 2024-07-31 RX ORDER — SODIUM CHLORIDE 9 MG/ML
INJECTION, SOLUTION INTRAVENOUS PRN
Status: DISCONTINUED | OUTPATIENT
Start: 2024-07-31 | End: 2024-08-02 | Stop reason: HOSPADM

## 2024-07-31 RX ORDER — ONDANSETRON 2 MG/ML
4 INJECTION INTRAMUSCULAR; INTRAVENOUS
Status: CANCELLED | OUTPATIENT
Start: 2024-07-31 | End: 2024-08-01

## 2024-07-31 RX ORDER — PROPOFOL 10 MG/ML
INJECTION, EMULSION INTRAVENOUS PRN
Status: DISCONTINUED | OUTPATIENT
Start: 2024-07-31 | End: 2024-07-31 | Stop reason: SDUPTHER

## 2024-07-31 RX ORDER — DIPHENHYDRAMINE HYDROCHLORIDE 50 MG/ML
12.5 INJECTION INTRAMUSCULAR; INTRAVENOUS
Status: CANCELLED | OUTPATIENT
Start: 2024-07-31 | End: 2024-08-01

## 2024-07-31 RX ORDER — SODIUM CHLORIDE, SODIUM LACTATE, POTASSIUM CHLORIDE, CALCIUM CHLORIDE 600; 310; 30; 20 MG/100ML; MG/100ML; MG/100ML; MG/100ML
INJECTION, SOLUTION INTRAVENOUS CONTINUOUS PRN
Status: DISCONTINUED | OUTPATIENT
Start: 2024-07-31 | End: 2024-07-31 | Stop reason: SDUPTHER

## 2024-07-31 RX ORDER — SODIUM CHLORIDE 9 MG/ML
INJECTION, SOLUTION INTRAVENOUS PRN
Status: CANCELLED | OUTPATIENT
Start: 2024-07-31

## 2024-07-31 RX ORDER — LABETALOL HYDROCHLORIDE 5 MG/ML
10 INJECTION, SOLUTION INTRAVENOUS
Status: CANCELLED | OUTPATIENT
Start: 2024-07-31

## 2024-07-31 RX ORDER — LORAZEPAM 2 MG/ML
0.5 INJECTION INTRAMUSCULAR
Status: CANCELLED | OUTPATIENT
Start: 2024-07-31 | End: 2024-08-01

## 2024-07-31 RX ORDER — NALOXONE HYDROCHLORIDE 0.4 MG/ML
INJECTION, SOLUTION INTRAMUSCULAR; INTRAVENOUS; SUBCUTANEOUS PRN
Status: CANCELLED | OUTPATIENT
Start: 2024-07-31

## 2024-07-31 RX ORDER — OXYCODONE HYDROCHLORIDE 5 MG/1
5 TABLET ORAL
Status: CANCELLED | OUTPATIENT
Start: 2024-07-31 | End: 2024-08-01

## 2024-07-31 RX ORDER — SODIUM CHLORIDE 0.9 % (FLUSH) 0.9 %
5-40 SYRINGE (ML) INJECTION EVERY 12 HOURS SCHEDULED
Status: CANCELLED | OUTPATIENT
Start: 2024-07-31

## 2024-07-31 RX ORDER — MEPERIDINE HYDROCHLORIDE 50 MG/ML
12.5 INJECTION INTRAMUSCULAR; INTRAVENOUS; SUBCUTANEOUS EVERY 5 MIN PRN
Status: CANCELLED | OUTPATIENT
Start: 2024-07-31

## 2024-07-31 RX ORDER — BISACODYL 10 MG
10 SUPPOSITORY, RECTAL RECTAL 2 TIMES DAILY
Status: DISCONTINUED | OUTPATIENT
Start: 2024-07-31 | End: 2024-08-02 | Stop reason: HOSPADM

## 2024-07-31 RX ORDER — SODIUM CHLORIDE 0.9 % (FLUSH) 0.9 %
5-40 SYRINGE (ML) INJECTION PRN
Status: CANCELLED | OUTPATIENT
Start: 2024-07-31

## 2024-07-31 RX ADMIN — HYDROMORPHONE HYDROCHLORIDE 0.5 MG: 1 INJECTION, SOLUTION INTRAMUSCULAR; INTRAVENOUS; SUBCUTANEOUS at 01:54

## 2024-07-31 RX ADMIN — PROPOFOL 40 MG: 10 INJECTION, EMULSION INTRAVENOUS at 14:17

## 2024-07-31 RX ADMIN — PROPOFOL 20 MG: 10 INJECTION, EMULSION INTRAVENOUS at 14:19

## 2024-07-31 RX ADMIN — HYDROMORPHONE HYDROCHLORIDE 0.5 MG: 1 INJECTION, SOLUTION INTRAMUSCULAR; INTRAVENOUS; SUBCUTANEOUS at 20:02

## 2024-07-31 RX ADMIN — SODIUM CHLORIDE, SODIUM LACTATE, POTASSIUM CHLORIDE, AND CALCIUM CHLORIDE: .6; .31; .03; .02 INJECTION, SOLUTION INTRAVENOUS at 14:13

## 2024-07-31 RX ADMIN — HYDROMORPHONE HYDROCHLORIDE 0.5 MG: 1 INJECTION, SOLUTION INTRAMUSCULAR; INTRAVENOUS; SUBCUTANEOUS at 11:29

## 2024-07-31 RX ADMIN — PROPOFOL 20 MG: 10 INJECTION, EMULSION INTRAVENOUS at 14:22

## 2024-07-31 ASSESSMENT — PAIN DESCRIPTION - ORIENTATION
ORIENTATION: LOWER

## 2024-07-31 ASSESSMENT — PAIN SCALES - GENERAL
PAINLEVEL_OUTOF10: 0
PAINLEVEL_OUTOF10: 7
PAINLEVEL_OUTOF10: 0
PAINLEVEL_OUTOF10: 7
PAINLEVEL_OUTOF10: 7
PAINLEVEL_OUTOF10: 0
PAINLEVEL_OUTOF10: 7

## 2024-07-31 ASSESSMENT — PAIN DESCRIPTION - DESCRIPTORS
DESCRIPTORS: ACHING
DESCRIPTORS: DISCOMFORT
DESCRIPTORS: ACHING;DISCOMFORT
DESCRIPTORS: ACHING;DISCOMFORT

## 2024-07-31 ASSESSMENT — PAIN DESCRIPTION - LOCATION
LOCATION: ABDOMEN
LOCATION: ABDOMEN;BACK
LOCATION: ABDOMEN
LOCATION: ABDOMEN

## 2024-07-31 ASSESSMENT — ENCOUNTER SYMPTOMS: SHORTNESS OF BREATH: 1

## 2024-07-31 NOTE — CONSULTS
CONSULTATION  Agustina Fried is a 64 y.o. female asked to see us in consultation by  Viridiana Blanco APRN - NP & Yonatan Coates MD for evaluation of gastric distension.    Ms. 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    She presents to the ER from [facility] with nausea, vomiting, abdominal pain and diarrhea.  She says symptoms began about 2 weeks ago.  She says staff told her she had some rectal bleeding as well.  CTap reports severe distension of the stomach and ileus of both the small and large bowel.  She is on percocet chronically.   NGT placed in the ER but it has only drained about 200 mL. It is in appropriate position.  Labs also noteworthy for anemia with drop in Hgb from 8.1 yesterday to 6.9 today.  No overt bleeding has been reported here.  Possibly some dilutional component.  She is currently receiving a unit of blood.    She follows at [facility] but  has not seen them since 8/2023.  It has been recommended she undergo a colonoscopy for anemia, as well as EUS for evaluation of elevated alk phos and double ductal sign on imaging, but she has failed to follow-up numerous times.    Plan:  EGD today.  Continue NGT to DENIS HIDALGO.  Notified [facility] of pathology results from endoscopy in April. They will set up a follow-up with her. Will update them on any pertinent endoscopy findings as well.  Start dulcolax suppositories bid for ileus.  Surgery was also consulted. Case was discussed wit  them.          Thank you for permitting us to participate in the care of your patient. Please do not hesitate to call with questions or concerns.     1.  The patient indicates understanding of these issues and agrees with the plan.  2.  I reviewed the patient's medical information and medical history.   3.  I have reviewed the past medical, family, and social history sections including the medications and allergies listed in the above medical record.        Electronically signed by: SARTHAK Bruce 7/31/2024 11:09 AM           (Office) [phone]  (Fax) [phone]  Available via perfect serve            29.2

## 2024-07-31 NOTE — CONSULTS
Consult Placed   Consult sent via perfect serve  Who: Carla Rodriguez  Date: 7/31/2024  Time: 9:33 AM       Electronically signed by Nichelle Max on 7/31/2024 at 9:33 AM

## 2024-07-31 NOTE — ANESTHESIA PRE PROCEDURE
Department of Anesthesiology  Preprocedure Note       Name:  Agustina Fried   Age:  64 y.o.  :  1960                                          MRN:  [id number]         Date:  2024      Surgeon: Surgeon(s):  Donny Grant MD    Procedure: Procedure(s):  ESOPHAGOGASTRODUODENOSCOPY DIAGNOSTIC ONLY    Medications prior to admission:   Prior to Admission medications    Medication Sig Start Date End Date Taking? Authorizing Provider   oxyCODONE-acetaminophen (PERCOCET) 5-325 MG per tablet Take 1 tablet by mouth every 8 hours as needed for Pain. Max Daily Amount: 3 tablets   Yes Imani Brady MD   loperamide (IMODIUM) 2 MG capsule Take 1 capsule by mouth 4 times daily as needed for Diarrhea   Yes Imani Brady MD   acetaminophen (TYLENOL) 325 MG tablet Take 2 tablets by mouth every 6 hours as needed for Pain   Yes Imani Brady MD   torsemide (DEMADEX) 10 MG tablet Take 1 tablet by mouth daily 24   Charlie Gunn MD   gabapentin (NEURONTIN) 100 MG capsule Take 2 capsules by mouth 2 times daily for 30 days. 24  Charlie Gunn MD   pantoprazole (PROTONIX) 40 MG tablet Take 1 tablet by mouth 2 times daily at 0800 and 1400 24   Ankur Douglas MD   traZODone (DESYREL) 100 MG tablet Take 1 tablet by mouth nightly 24   Ankur Douglas MD   insulin glargine (LANTUS) 100 UNIT/ML injection vial Inject 8 Units into the skin daily 24   Ankur Douglas MD   amLODIPine (NORVASC) 5 MG tablet Take 1 tablet by mouth daily 24   Ankur Douglas MD   Continuous Blood Gluc Sensor (FREESTYLE CHRISTY 2 SENSOR) MISC Replace every 2 weeks 24   Anny Franklin MD   insulin lispro, 1 Unit Dial, (HUMALOG KWIKPEN) 100 UNIT/ML SOPN Inject 0-4 Units into the skin 3 times daily (before meals) 24   Anny Franklin MD   atorvastatin (LIPITOR) 40 MG tablet Take 1 tablet by mouth nightly 23   Margoth Corado, SARTHAK - CNP   Blood Glucose

## 2024-07-31 NOTE — PROGRESS NOTES
Occupational Therapy/Physical Therapy  OT/PT orders received. Pt initially agreeable to therapy session and answered all questions for PLOF, but once time to complete OOB activities, pt refusing OOB activity, not opening eyes. 10 non billable units spent with pt this date. Will follow up as pt allows.    Margo Arambula, OTR/L  Yolanda Conway, PT

## 2024-07-31 NOTE — ANESTHESIA POSTPROCEDURE EVALUATION
Department of Anesthesiology  Postprocedure Note    Patient: Agustina Fried  MRN: [id number]  YOB: 1960  Date of evaluation: 7/31/2024    Procedure Summary       Date: 07/31/24 Room / Location: [place] [address] / [facility]    Anesthesia Start: 1413 Anesthesia Stop: 1431    Procedure: ESOPHAGOGASTRODUODENOSCOPY BIOPSY Diagnosis:       Gastric outlet obstruction      (Gastric outlet obstruction [K31.1])    Surgeons: Donny Grant MD Responsible Provider: Maico Chino MD    Anesthesia Type: MAC ASA Status: 3            Anesthesia Type: No value filed.    Marnie Phase I: Marnie Score: 10    Marnie Phase II: Marnie Score: 9    Anesthesia Post Evaluation    Patient location during evaluation: PACU  Patient participation: complete - patient participated  Level of consciousness: awake and alert  Airway patency: patent  Nausea & Vomiting: no vomiting and no nausea  Cardiovascular status: hemodynamically stable and blood pressure returned to baseline  Respiratory status: acceptable  Hydration status: euvolemic  Comments: --------------------            07/31/24               1440     --------------------   BP:     (!) 140/60   Pulse:      66       Resp:       18       Temp:                SpO2:      97%      --------------------    Pain management: adequate    No notable events documented.

## 2024-07-31 NOTE — ACP (ADVANCE CARE PLANNING)
Advance Care Planning     General Advance Care Planning (ACP) Conversation    Date of Conversation: 7/31/2024  Conducted with: Patient with Decision Making Capacity  Other persons present: None    Healthcare Decision Maker:   Primary Decision Maker: Maurice Fried - Child - [phone]    Secondary Decision Maker: Moi Fried - Brother/Sister - [phone]    Secondary Decision Maker: CorkyMarlyn - Daughter-in-Law - [phone]    Supplemental (Other) Decision Maker: PEMA DIANE - Other - [phone]  Click here to complete Healthcare Decision Makers including selection of the Healthcare Decision Maker Relationship (ie \"Primary\").       Content/Action Overview:  Has ACP document(s) on file - reflects the patient's care preferences  Reviewed DNR/DNI and patient elects Full Code (Attempt Resuscitation)      Length of Voluntary ACP Conversation in minutes:  <16 minutes (Non-Billable)    Evelin García RN

## 2024-07-31 NOTE — PROGRESS NOTES
[facility]    PATIENT NAME: Agustina Fried     TODAY'S DATE: 7/31/2024    CHIEF COMPLAINT: none    INTERVAL HISTORY/HPI:    Pt resting comfortably. Blood transfusing. EGD today     OBJECTIVE:  VITALS:  BP (!) 153/65   Pulse 63   Temp 98.5 °F (36.9 °C)   Resp 18   Ht 1.6 m (5' 3\")   Wt 55 kg (121 lb 4.1 oz)   SpO2 98%   BMI 21.48 kg/m²     INTAKE/OUTPUT:    I/O last 3 completed shifts:  In: -   Out: 700 [Urine:600; Emesis/NG output:100]  I/O this shift:  In: 176 [Blood:176]  Out: 0     CONSTITUTIONAL:  fatigued and alert  LUNGS:  no crackles or wheezing  ABDOMEN:   soft, non-distended, non-tender     Data:  CBC:   Recent Labs     07/30/24  1003 07/31/24  0550   WBC 10.8 8.6   HGB 8.1* 6.9*   HCT 25.3* 21.3*    251     BMP:    Recent Labs     07/30/24  1003 07/31/24  0550    138   K 4.0 4.2    102   CO2 29 25   BUN 26* 22*   CREATININE 2.3* 1.6*   GLUCOSE 50* 115*     Hepatic:   Recent Labs     07/30/24  1003   AST 18   ALT 19   BILITOT 0.3   ALKPHOS 265*           ASSESSMENT AND PLAN:  Gastric distention - possible GOO - EGD today  Continue with ngt decompression.   Possible UGI study.       Electronically signed by SARTHAK Landrum - PIETER

## 2024-07-31 NOTE — PROGRESS NOTES
Phone consent obtained from Maurice Fried,son, decision maker for esophagogastroduodenoscopy and and anesthesia. Witnessed by Roland Brooks RN and Dr Chino anesthesiologist.

## 2024-07-31 NOTE — PLAN OF CARE
Problem: Safety - Adult  Goal: Free from fall injury  7/31/2024 0844 by Cece Wells, RN  Outcome: Progressing  7/30/2024 2242 by Nallely Stewart, RN  Outcome: Progressing

## 2024-07-31 NOTE — CARE COORDINATION
Case Management Assessment  Initial Evaluation    Date/Time of Evaluation: 7/31/2024 12:04 PM  Assessment Completed by: Evelin García RN    If patient is discharged prior to next notation, then this note serves as note for discharge by case management.    Patient Name: Agustina Fried                   YOB: 1960  Diagnosis: Dehydration [E86.0]  Acute distention of stomach [K31.0]  Ileus (HCC) [K56.7]  Hypoglycemia [E16.2]  Gastric distention [K31.89]  Nausea vomiting and diarrhea [R11.2, R19.7]                   Date / Time: 7/30/2024  9:45 AM    Patient Admission Status: Inpatient   Readmission Risk (Low < 19, Mod (19-27), High > 27): Readmission Risk Score: 34.6    Current PCP: Viridiana Blanco APRN - NP  PCP verified by CM? Yes    Chart Reviewed: Yes      History Provided by: Patient  Patient Orientation: Alert and Oriented, Person, Place, Situation, Self    Patient Cognition: Alert    Hospitalization in the last 30 days (Readmission):  Yes    If yes, Readmission Assessment in CM Navigator will be completed.    Advance Directives:      Code Status: Full Code   Patient's Primary Decision Maker is: Legal Next of Kin    Primary Decision Maker: Maurice Fried - Child - [phone]    Secondary Decision Maker: Moi Fried - Brother/Sister - [phone]    Secondary Decision Maker: Marlyn Fried - Daughter-in-Law - [phone]    Supplemental (Other) Decision Maker: PEMA DIANE - Other - [phone]    Discharge Planning:    Patient lives with: Alone Type of Home: Apartment  Primary Care Giver: Self  Patient Support Systems include: Children, Family Members   Current Financial resources: Medicaid  Current community resources: None  Current services prior to admission: Skilled Nursing Facility            Current DME: Walker            Type of Home Care services:  None    ADLS  Prior functional level: Independent in ADLs/IADLs  Current functional level: Assistance with the following:, Mobility,  [R11.2, R19.7]    IF APPLICABLE: The Patient and/or patient representative Agustina and her family were provided with a choice of provider and agrees with the discharge plan. Freedom of choice list with basic dialogue that supports the patient's individualized plan of care/goals and shares the quality data associated with the providers was provided to:     Patient Representative Name:       The Patient and/or Patient Representative Agree with the Discharge Plan?      Evelin García RN  Case Management Department

## 2024-07-31 NOTE — BRIEF OP NOTE
Post-Sedation Assessment  Agustina Fried   7/31/2024  A pre-sedation re-evaluation was performed immediately prior to beginning the procedure.  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

## 2024-07-31 NOTE — PROGRESS NOTES
Hospital Medicine Progress Note      Date of Admission: 7/30/2024  Hospital Day: 2    Chief Admission Complaint:  \"Abdominal pain nausea and vomiting gastric\"     Subjective:  no new c/o    Presenting Admission History:         \"This is a 64-year-old female who was sent from [facility] where she resides with chief complaint of ongoing nausea and vomiting with diarrhea over the last several days.  Patient has been complaining of abdominal pain and cramps.  As per nursing home report patient was noticed to have some blood per rectum.  Patient denies any coffee-ground vomiting and she feels like her stomach is swollen.     The ED workup notable for:  NG tube was placed in the emergency room to decompress status stomach.  General surgery consult requested by ED physician  As per general surgery recommendation:  Due to severe gastric distention of unclear etiology patient is to be admitted  NG tube inserted     Patient denies any shortness of breath no palpitation orthopnea dyspnea with exertion no diaphoresis.     Patient denies any muscle weakness paralysis or loss of sensation no blurry vision no double vision no neck stiffness.  No recent traveling no history of exposure to sick contacts     The workup in the emergency room notable for:  Laboratory workup significant for elevated creatinine 2.3 decreased GFR 23 patient CKD 3B     Glucose 50  Elevated troponin of 115, repeat troponin 107 slightly trending down  Elevated alk phos  CBC overall unremarkable except for chronic anemia H&H 8.1 25.3  History of recent E. coli positive UTI\"       Assessment/Plan:      Current Principal Problem:  Acute distention of stomach      General surgery consult recommendations as follows:  Due to severe gastric distention of unclear etiology patient is to be admitted  NG tube insertion  Patient will be kept n.p.o.  IV fluid hydration after IV fluid boluses will be maintained  GI consult for further workup and upper GI endoscopy if

## 2024-07-31 NOTE — ED PROVIDER NOTES
[facility] C3 TELE/MED SURG/ONC  Emergency Department Encounter    Patient Name: Agustina Fried  MRN: [id number]  YOB: 1960  Date of Evaluation: 7/30/2024  Provider: Viridiana Blanco APRN - NP  Note Started: 8:47 PM EDT 7/30/24    CHIEF COMPLAINT  Diarrhea (Patient in from [facility] [place] operated by [facility] for nausea, emesis, and diarrhea for several days. Patient denies any complaints but says she says she's been having rectal bleeding)    SHARED SERVICE VISIT  I have seen and evaluated this patient with my supervising physician, Dr. John.     HISTORY OF PRESENT ILLNESS  Agustina Fried is a 64 y.o. female who presents to the ED for evaluation of abdominal discomfort with diarrhea as well as nausea and vomiting.  Patient brought in by squad.  Symptoms began earlier today.  She denies chest pain or shortness of breath.  No cough or congestion.  Unsure fevers or chills.  No urinary symptoms..    No other complaints, modifying factors or associated symptoms.     Nursing notes reviewed were all reviewed and agreed with or any disagreements were addressed in the HPI.    PMH:  Past Medical History:   Diagnosis Date    Abnormal brain MRI 7/20/2017    Partially empty sella and minimal chronic small vessel ischemic disease    Acute bilateral low back pain without sciatica 11/2/2016    SHARRON (acute kidney injury) ([facility]) 7/5/2017    Arthritis     back    Bipolar disorder ([facility]) 10/18/2008    CAD (coronary artery disease)     stent placed 6/8/20    Cancer ([facility]) 2015    bilateral breast:s/p lumpectomy/radiation:under care care of breast specialist:Dr. Boone     Carotid stenosis, bilateral:<50%:per US 7/2016 7/15/2016    Carpal tunnel syndrome 10/18/2008    Cervical cancer screening 2014    Nml per pt'.    Coronary artery disease of native artery of native heart with stable angina pectoris ([facility]) 6/9/2020    DDD (degenerative disc disease), lumbar 7/18/2018    Depression     under care of pschiatrist:Dr. Rojas    Depression/anxiety  insulin lispro (HUMALOG,ADMELOG) injection vial 0-4 Units  0-4 Units SubCUTAneous Nightly Marina Oliva MD        glucose chewable tablet 16 g  4 tablet Oral PRN Marina Oliva MD        dextrose bolus 10% 125 mL  125 mL IntraVENous PRN Marina Oliva MD        Or    dextrose bolus 10% 250 mL  250 mL IntraVENous PRN Marina Oliva MD        glucagon injection 1 mg  1 mg SubCUTAneous PRN Marina Oliva MD        dextrose 10 % infusion   IntraVENous Continuous PRN Marina Oliva MD        HYDROmorphone (DILAUDID) injection 0.5 mg  0.5 mg IntraVENous Q3H PRN Marina Oliva MD   0.5 mg at 07/30/24 1847     Allergies:  Allergies   Allergen Reactions    Morphine Anaphylaxis and Hives     feels like throat is closing    Penicillins Hives and Swelling    Codeine Hives and Rash     Screenings:     Alberto Coma Scale  Eye Opening: Spontaneous  Best Verbal Response: Oriented  Best Motor Response: Obeys commands  [place] Coma Scale Score: 15           CIWA Assessment  BP: (!) 152/60  Pulse: 64          REVIEW OF SYSTEMS  Positives and Pertinent Negatives as per HPI.    PHYSICAL EXAM  BP (!) 152/60   Pulse 64   Temp 98.6 °F (37 °C) (Oral)   Resp 17   Ht 1.6 m (5' 3\")   Wt 59 kg (130 lb)   SpO2 97%   BMI 23.03 kg/m²     GENERAL APPEARANCE: Awake and alert. Cooperative.  No acute distress.  HEAD: Normocephalic. Atraumatic.  EYES:  EOM's grossly intact.   ENT: Mucous membranes are moist.   NECK: Supple.   HEART: RRR. No murmurs, rubs, or gallops.  LUNGS: CTAB. Respirations unlabored. Good air exchange.   ABDOMEN: Soft. Non-distended.  Mild generalized nonfocal abdominal tenderness without rigidity, guarding or rebound.  No CVA tenderness.  Bowel sounds present.  EXTREMITIES: No peripheral edema. Moves all extremities equally. All extremities neurovascularly intact.   SKIN: Warm and dry. No acute rashes.   NEUROLOGICAL: Alert and oriented. No gross facial drooping. 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    Social Determinants:   Patient on long-term resident of the local nursing facility.  Does appeared to have limited transportation to and from appointments.    Consults:   Case discussed with general surgeon and hospitalist regarding admission for CT findings of ileus  Gastric distention.    Reassessment:      Patient had an NG tube placed Without acute complications.  Vitals have remained stable.    MDM/Disposition Considerations:   Briefly Agustina Fried presents for evaluation of abdominal pain with nausea vomiting and diarrhea.  CBC with H&H of 8.1 and 25.3.  Which appears fairly baseline for patient.  CMP with BUN and creatinine 26 and 2.3.  According to chart review this also appears baseline.  Troponin was somewhat elevated however from patient's apparent baseline of 115.  Repeat of 107.  EKG consistent with sinus rhythm with PACs.  Baseline appears in the 70s to 50s.  Lipase was normal.  Chest x-ray stable.  CT abdomen pelvis with moderate ileus with severe gastric distention.  NG tube placed for decompression.  Patient was hypoglycemic at 51.  Started on D10.  Improvement to 92.  Case was discussed with general surgeon and hospitalist and admission orders have been placed.    Critical Care Time:   45 Minutes of critical care time spent not including

## 2024-07-31 NOTE — H&P
Pre-sedation Assessment    History and Physical / Pre-Sedation Assessment  Patient:  Agustina Fried   :   1960     Intended Procedure: EGD      HPI: 65yo F with h/o malignant CU presents with N/V and abd pain in the setting of a CT that demonstrates gastric distention and ileus.  Anemia is present.    Past Medical History:   Diagnosis Date    Abnormal brain MRI 2017    Partially empty sella and minimal chronic small vessel ischemic disease    Acute bilateral low back pain without sciatica 2016    SHARRON (acute kidney injury) ([facility]) 2017    Arthritis     back    Bipolar disorder ([facility]) 10/18/2008    CAD (coronary artery disease)     stent placed 20    Cancer ([facility])     bilateral breast:s/p lumpectomy/radiation:under care care of breast specialist:Dr. Boone     Carotid stenosis, bilateral:<50%:per US 2016 7/15/2016    Carpal tunnel syndrome 10/18/2008    Cervical cancer screening     Nml per pt'.    Coronary artery disease of native artery of native heart with stable angina pectoris ([facility]) 2020    DDD (degenerative disc disease), lumbar 2018    Depression     under care of pschiatrist:Dr. 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 myelopathy or radiculopathy 3/10/2017    Transient cerebral ischemia 07/15/2016    TIA:7/10/16    Unspecified cerebral  Take 2 capsules by mouth 2 times daily 120 capsule 0    AUSTEDO 9 MG tablet Take 1 tablet by mouth daily      paliperidone (INVEGA) 9 MG extended release tablet Take 1 tablet by mouth every morning      calcium carbonate-vitamin D (CALTRATE) 600-400 MG-UNIT TABS per tab Take 1 tablet by mouth daily         Nurses notes reviewed and agreed.  Medications reviewed  Allergies:   Allergies   Allergen Reactions    Morphine Anaphylaxis and Hives     feels like throat is closing    Penicillins Hives and Swelling    Codeine Hives and Rash           Physical Exam:  Vital Signs: BP (!) 153/65   Pulse 63   Temp 98.5 °F (36.9 °C)   Resp 18   Ht 1.6 m (5' 3\")   Wt 55 kg (121 lb 4.1 oz)   SpO2 98%   BMI 21.48 kg/m²  Body mass index is 21.48 kg/m².  Airway:Normal  Cardiac:Normal  Pulmonary:Normal  Abdomen:Normal  Specific to procedure: Mallampati 3      Pre-Procedure Assessment/Plan:  ASA 3 - Patient with moderate systemic disease with functional limitations    Level of Sedation Plan:Deep sedation    Post Procedure plan: Return to same level of care    I assessed the patient and find that the patient is in satisfactory condition to proceed with the planned procedure and sedation plan.    I have explained the risk, benefits, and alternatives to the procedure. The patient understands and agrees to proceed.  Yes    Donny Grant MD  2:13 PM 7/31/2024

## 2024-08-01 LAB
ALBUMIN SERPL-MCNC: 2.5 G/DL (ref 3.4–5)
ANION GAP SERPL CALCULATED.3IONS-SCNC: 11 MMOL/L (ref 3–16)
BUN SERPL-MCNC: 22 MG/DL (ref 7–20)
CALCIUM SERPL-MCNC: 8.4 MG/DL (ref 8.3–10.6)
CHLORIDE SERPL-SCNC: 101 MMOL/L (ref 99–110)
CO2 SERPL-SCNC: 27 MMOL/L (ref 21–32)
CREAT SERPL-MCNC: 1.6 MG/DL (ref 0.6–1.2)
DEPRECATED RDW RBC AUTO: 15.2 % (ref 12.4–15.4)
GFR SERPLBLD CREATININE-BSD FMLA CKD-EPI: 35 ML/MIN/{1.73_M2}
GLUCOSE BLD-MCNC: 103 MG/DL (ref 70–99)
GLUCOSE BLD-MCNC: 103 MG/DL (ref 70–99)
GLUCOSE BLD-MCNC: 107 MG/DL (ref 70–99)
GLUCOSE BLD-MCNC: 94 MG/DL (ref 70–99)
GLUCOSE BLD-MCNC: 97 MG/DL (ref 70–99)
GLUCOSE BLD-MCNC: 97 MG/DL (ref 70–99)
GLUCOSE SERPL-MCNC: 101 MG/DL (ref 70–99)
HCT VFR BLD AUTO: 30.8 % (ref 36–48)
HGB BLD-MCNC: 10 G/DL (ref 12–16)
MCH RBC QN AUTO: 29.2 PG (ref 26–34)
MCHC RBC AUTO-ENTMCNC: 32.3 G/DL (ref 31–36)
MCV RBC AUTO: 90.3 FL (ref 80–100)
PERFORMED ON: ABNORMAL
PERFORMED ON: NORMAL
PHOSPHATE SERPL-MCNC: 3.3 MG/DL (ref 2.5–4.9)
PLATELET # BLD AUTO: 270 K/UL (ref 135–450)
PMV BLD AUTO: 9.5 FL (ref 5–10.5)
POTASSIUM SERPL-SCNC: 4 MMOL/L (ref 3.5–5.1)
RBC # BLD AUTO: 3.41 M/UL (ref 4–5.2)
SODIUM SERPL-SCNC: 139 MMOL/L (ref 136–145)
WBC # BLD AUTO: 10.8 K/UL (ref 4–11)

## 2024-08-01 PROCEDURE — 2580000003 HC RX 258: Performed by: INTERNAL MEDICINE

## 2024-08-01 PROCEDURE — 87324 CLOSTRIDIUM AG IA: CPT

## 2024-08-01 PROCEDURE — 6360000002 HC RX W HCPCS: Performed by: INTERNAL MEDICINE

## 2024-08-01 PROCEDURE — 36415 COLL VENOUS BLD VENIPUNCTURE: CPT

## 2024-08-01 PROCEDURE — 1200000000 HC SEMI PRIVATE

## 2024-08-01 PROCEDURE — 6370000000 HC RX 637 (ALT 250 FOR IP): Performed by: NURSE PRACTITIONER

## 2024-08-01 PROCEDURE — 85027 COMPLETE CBC AUTOMATED: CPT

## 2024-08-01 PROCEDURE — 87449 NOS EACH ORGANISM AG IA: CPT

## 2024-08-01 PROCEDURE — 80069 RENAL FUNCTION PANEL: CPT

## 2024-08-01 RX ORDER — MECOBALAMIN 5000 MCG
10 TABLET,DISINTEGRATING ORAL NIGHTLY
Status: DISCONTINUED | OUTPATIENT
Start: 2024-08-01 | End: 2024-08-02 | Stop reason: HOSPADM

## 2024-08-01 RX ADMIN — MEROPENEM 1000 MG: 1 INJECTION, POWDER, FOR SOLUTION INTRAVENOUS at 16:11

## 2024-08-01 RX ADMIN — ENOXAPARIN SODIUM 30 MG: 100 INJECTION SUBCUTANEOUS at 10:06

## 2024-08-01 RX ADMIN — SODIUM CHLORIDE, POTASSIUM CHLORIDE, SODIUM LACTATE AND CALCIUM CHLORIDE: 600; 310; 30; 20 INJECTION, SOLUTION INTRAVENOUS at 01:02

## 2024-08-01 RX ADMIN — HYDROMORPHONE HYDROCHLORIDE 0.5 MG: 1 INJECTION, SOLUTION INTRAMUSCULAR; INTRAVENOUS; SUBCUTANEOUS at 19:36

## 2024-08-01 RX ADMIN — HYDROMORPHONE HYDROCHLORIDE 0.5 MG: 1 INJECTION, SOLUTION INTRAMUSCULAR; INTRAVENOUS; SUBCUTANEOUS at 14:04

## 2024-08-01 RX ADMIN — SODIUM CHLORIDE, POTASSIUM CHLORIDE, SODIUM LACTATE AND CALCIUM CHLORIDE: 600; 310; 30; 20 INJECTION, SOLUTION INTRAVENOUS at 09:59

## 2024-08-01 RX ADMIN — Medication 10 MG: at 21:25

## 2024-08-01 RX ADMIN — SODIUM CHLORIDE, POTASSIUM CHLORIDE, SODIUM LACTATE AND CALCIUM CHLORIDE: 600; 310; 30; 20 INJECTION, SOLUTION INTRAVENOUS at 19:38

## 2024-08-01 RX ADMIN — HYDROMORPHONE HYDROCHLORIDE 0.5 MG: 1 INJECTION, SOLUTION INTRAMUSCULAR; INTRAVENOUS; SUBCUTANEOUS at 01:08

## 2024-08-01 RX ADMIN — SODIUM CHLORIDE, PRESERVATIVE FREE 10 ML: 5 INJECTION INTRAVENOUS at 21:25

## 2024-08-01 RX ADMIN — BISACODYL 10 MG: 10 SUPPOSITORY RECTAL at 10:06

## 2024-08-01 ASSESSMENT — PAIN DESCRIPTION - LOCATION
LOCATION: ABDOMEN
LOCATION: BACK

## 2024-08-01 ASSESSMENT — PAIN DESCRIPTION - DESCRIPTORS: DESCRIPTORS: ACHING

## 2024-08-01 ASSESSMENT — PAIN SCALES - GENERAL
PAINLEVEL_OUTOF10: 8
PAINLEVEL_OUTOF10: 8

## 2024-08-01 NOTE — PROGRESS NOTES
Physical Therapy/Occupational [employer]  Order received, chart reviewed, RN cleared pt for PT, pt unable to be roused except to adamantly state \"no\" when inquiries made, pt not agreeable to out of be mobility  No charge   Thank you  Sol Villela, PT  Yuko Maldonado, OT

## 2024-08-01 NOTE — CARE COORDINATION
Chart review day 2- pt [facility]. Would need precert to return. SBO. NGT to LIWS; new duodenal mass. ONC consult today; Unsure of plan at this time. Cm will continue to follow for DCP needs

## 2024-08-01 NOTE — PROGRESS NOTES
PROGRESS NOTE    HPI: Agustina Fried is a(n)64 y.o. female admitted for work-up and treatment for Dehydration [E86.0]  Acute distention of stomach [K31.0]  Ileus (HCC) [K56.7]  Hypoglycemia [E16.2]  Gastric distention [K31.89]  Nausea vomiting and diarrhea [R11.2, R19.7].     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    Subjective:     No changes.  NGT to suction.  She would not open her eyes for me but did answer questions.   Reports abdominal pain.      Objective:     I/O last 3 completed shifts:  In: 376 [I.V.:200; Blood:176]  Out: 1700 [Urine:1200; Emesis/NG output:500]      /72   Pulse 88   Temp 97.6 °F (36.4 °C) (Oral)   Resp 18   Ht 1.6 m (5' 3\")   Wt 51.3 kg (113 lb)   SpO2 100%   BMI 20.02 kg/m²     Physical Exam:  HEENT: anicteric sclera, oropharyngeal membranes pink and moist. + NGT  Cor: RRR  Lungs: non-labored, no respiratory distress  Abdomen: mildly distended but soft, mild diffuse tenderness  Extremities: no edema  Neuro: won't open eyes, oriented x 3      Results:   Lab Results   Component Value Date    ALT 19 07/30/2024    AST 18 07/30/2024     (H) 04/21/2024    ALKPHOS 265 (H) 07/30/2024    BILIDIR <0.2 04/21/2024    INR 1.03 04/23/2024    LIPASE 5.0 (L) 07/30/2024     Lab Results   Component Value Date    WBC 10.8 08/01/2024    HGB 10.0 (L) 08/01/2024    HCT 30.8 (L) 08/01/2024    MCV 90.3 08/01/2024     08/01/2024     BUN/Cr/glu/ALT/AST/amyl/lip:  22/1.6/--/--/--/--/-- (08/01 0509)  CT CHEST WO CONTRAST    Result Date: 7/31/2024  Trace bilateral pleural effusions. Patchy ground-glass opacity within the left lower lobe with superimposed micronodularity; correlate for pneumonitis.  Metastatic disease cannot be entirely excluded; continued attention on follow-up is suggested. Atherosclerosis, including coronary artery calcification. 1.5 cm hypodense right thyroid nodule, for which follow-up recommendations are as below. Mild  for our conversation.  I told her aggressive treatment would include surgery and possible chemotherapy, with concern for her ability to tolerate chemo as she has poor functional status. She says she would want to move forward with aggressive management.  I have consulted [facility] and did discuss the case with them.   I was able to reach her family today. They plan to come to the hospital later. Will re discuss the above with the patient when her family arrives.    Continue NGT to [facility].      Addendum 1346- Discussed above with patient and family including concerns for the patient's ability to tolerate chemotherapy. All parties want aggressive treatment.  Will await pathology result and meet with family and patient again with oncology team tomorrow.  If plan is to move forward with treatment, would need transferred to [facility] for surgery-oncology input. At that time would also recommend starting TPN.  If palliative route pursued, then would recommend venting peg tube which we could place here.              Please do not hesitate to call with questions or concerns.      Electronically signed by: SARTHAK Bruce 8/1/2024 12:27 PM     (Office) [phone]  (Fax) [phone]  Available via perfect serve

## 2024-08-01 NOTE — CONSULTS
Consult Placed     Who: JOCELYN  Date:8/1/24  Time:1120     Electronically signed by Hue Barker on 8/1/2024 at 11:21 AM

## 2024-08-01 NOTE — PROGRESS NOTES
Hospital Medicine Progress Note      Date of Admission: 7/30/2024  Hospital Day: 3    Chief Admission Complaint:  \"Abdominal pain nausea and vomiting gastric\"     Subjective:  no new c/o    Presenting Admission History:         \"This is a 64-year-old female who was sent from  where she resides with chief complaint of ongoing nausea and vomiting with diarrhea over the last several days.  Patient has been complaining of abdominal pain and cramps.  As per nursing home report patient was noticed to have some blood per rectum.  Patient denies any coffee-ground vomiting and she feels like her stomach is swollen.     The ED workup notable for:  NG tube was placed in the emergency room to decompress status stomach.  General surgery consult requested by ED physician  As per general surgery recommendation:  Due to severe gastric distention of unclear etiology patient is to be admitted  NG tube inserted     Patient denies any shortness of breath no palpitation orthopnea dyspnea with exertion no diaphoresis.     Patient denies any muscle weakness paralysis or loss of sensation no blurry vision no double vision no neck stiffness.  No recent traveling no history of exposure to sick contacts     The workup in the emergency room notable for:  Laboratory workup significant for elevated creatinine 2.3 decreased GFR 23 patient CKD 3B     Glucose 50  Elevated troponin of 115, repeat troponin 107 slightly trending down  Elevated alk phos  CBC overall unremarkable except for chronic anemia H&H 8.1 25.3  History of recent E. coli positive UTI\"       Assessment/Plan:      Current Principal Problem:  Acute distention of stomach      General surgery consult recommendations as follows:  Due to severe gastric distention of unclear etiology patient is to be admitted  NG tube insertion  Patient will be kept n.p.o.  IV fluid hydration after IV fluid boluses will be maintained  GI consult for further workup and upper GI endoscopy if  for clinical significance.     Recent Labs     07/30/24  1003 07/31/24  0550 07/31/24  1624 08/01/24  0509   WBC 10.8 8.6  --  10.8   HGB 8.1* 6.9* 9.9* 10.0*   HCT 25.3* 21.3* 30.5* 30.8*    251  --  270       Recent Labs     07/30/24  1003 07/31/24  0550 08/01/24  0509    138 139   K 4.0 4.2 4.0    102 101   CO2 29 25 27   BUN 26* 22* 22*   CREATININE 2.3* 1.6* 1.6*   CALCIUM 8.3 7.7* 8.4   PHOS  --   --  3.3       Recent Labs     07/30/24  1003 07/30/24  1217   TROPHS 115* 107*       Recent Labs     07/30/24  1003   LABA1C 5.4       Recent Labs     07/30/24  1003   AST 18   ALT 19   BILITOT 0.3   ALKPHOS 265*       Recent Labs     07/31/24  0550   LACTA 3.5*         Urine Cultures:   Lab Results   Component Value Date/Time    LABURIN >100,000 CFU/ml  Sensitivity to follow   07/31/2024 04:34 AM     Blood Cultures:   Lab Results   Component Value Date/Time    BC No Growth after 4 days of incubation. 04/17/2024 06:19 AM     Lab Results   Component Value Date/Time    BLOODCULT2 No Growth after 4 days of incubation. 04/17/2024 06:19 AM     Organism:   Lab Results   Component Value Date/Time    ORG Escherichia coli 07/31/2024 04:34 AM         Yonatan Coates MD

## 2024-08-01 NOTE — PROGRESS NOTES
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  There does not appear to be a Gen Surg role in her care at this time.  Agree w/ GI plan for Oncology consult/input.  I will defer to GI the decision on whether any form of gastric decompression should be offered (ie PEG).    If further surgical input on the duodenal tumor is needed, then would suggest referral to Surg Onc at [facility].    We will sign off.  Please call w/ further questions.    DTW

## 2024-08-01 NOTE — PROGRESS NOTES
4 Eyes Skin Assessment     The patient is being assess for  Low Maco    I agree that 2 RN's have performed a thorough Head to Toe Skin Assessment on the patient. ALL assessment sites listed below have been assessed.       Areas assessed by both nurses: Madalyn RN, Magnolia RN    [x]   Head, Face, and Ears   [x]   Shoulders, Back, and Chest  [x]   Arms, Elbows, and Hands   [x]   Coccyx, Sacrum, and IschIum  [x]   Legs, Feet, and Heels        Does the Patient have Skin Breakdown?  Calluses on bilat feet, R heel callus        Maco Prevention initiated:  Yes   Wound Care Orders initiated:  Yes      WOC nurse consulted for Pressure Injury (Stage 3,4, Unstageable, DTI, NWPT, and Complex wounds), New and Established Ostomies:  Yes      Nurse 1 eSignature: Electronically signed by MADALYN DIAZ RN on 8/1/24 at 10:11 AM EDT    **SHARE this note so that the co-signing nurse is able to place an eSignature**    Nurse 2 eSignature: Electronically signed by Magnolia Lindsey RN on 8/1/24 at 10:16 AM EDT

## 2024-08-01 NOTE — PLAN OF CARE
Problem: Pain  Goal: Verbalizes/displays adequate comfort level or baseline comfort level  Outcome: Progressing  Flowsheets (Taken 7/31/2024 1545 by Cece Wells, RN)  Verbalizes/displays adequate comfort level or baseline comfort level: Encourage patient to monitor pain and request assistance     Problem: Safety - Adult  Goal: Free from fall injury  Outcome: Progressing     Problem: Chronic Conditions and Co-morbidities  Goal: Patient's chronic conditions and co-morbidity symptoms are monitored and maintained or improved  Outcome: Progressing  Flowsheets (Taken 7/31/2024 1926)  Care Plan - Patient's Chronic Conditions and Co-Morbidity Symptoms are Monitored and Maintained or Improved:   Monitor and assess patient's chronic conditions and comorbid symptoms for stability, deterioration, or improvement   Collaborate with multidisciplinary team to address chronic and comorbid conditions and prevent exacerbation or deterioration   Update acute care plan with appropriate goals if chronic or comorbid symptoms are exacerbated and prevent overall improvement and discharge

## 2024-08-02 ENCOUNTER — APPOINTMENT (OUTPATIENT)
Dept: GENERAL RADIOLOGY | Age: 64
DRG: 247 | End: 2024-08-02
Payer: MEDICAID

## 2024-08-02 ENCOUNTER — HOSPITAL ENCOUNTER (INPATIENT)
Age: 64
LOS: 20 days | Discharge: NURSING, REHAB OR LONG-TERM CARE | End: 2024-08-22
Attending: SURGERY | Admitting: INTERNAL MEDICINE
Payer: MEDICAID

## 2024-08-02 VITALS
SYSTOLIC BLOOD PRESSURE: 162 MMHG | HEART RATE: 70 BPM | BODY MASS INDEX: 20.02 KG/M2 | HEIGHT: 63 IN | DIASTOLIC BLOOD PRESSURE: 63 MMHG | WEIGHT: 113 LBS | TEMPERATURE: 98.1 F | OXYGEN SATURATION: 98 % | RESPIRATION RATE: 18 BRPM

## 2024-08-02 DIAGNOSIS — K62.5 RECTAL BLEEDING: ICD-10-CM

## 2024-08-02 DIAGNOSIS — K31.5 DUODENAL OBSTRUCTION: ICD-10-CM

## 2024-08-02 DIAGNOSIS — C17.0 DUODENAL CANCER (HCC): Primary | ICD-10-CM

## 2024-08-02 DIAGNOSIS — K56.609 SMALL BOWEL OBSTRUCTION (HCC): ICD-10-CM

## 2024-08-02 PROBLEM — K31.89 DUODENAL MASS: Status: ACTIVE | Noted: 2024-08-02

## 2024-08-02 LAB
ALBUMIN SERPL-MCNC: 2.3 G/DL (ref 3.4–5)
ANION GAP SERPL CALCULATED.3IONS-SCNC: 16 MMOL/L (ref 3–16)
BUN SERPL-MCNC: 24 MG/DL (ref 7–20)
C DIFF TOX A+B STL QL IA: NORMAL
CALCIUM SERPL-MCNC: 8.1 MG/DL (ref 8.3–10.6)
CEA SERPL-MCNC: 50.5 NG/ML (ref 0–5)
CHLORIDE SERPL-SCNC: 102 MMOL/L (ref 99–110)
CO2 SERPL-SCNC: 22 MMOL/L (ref 21–32)
CREAT SERPL-MCNC: 1.8 MG/DL (ref 0.6–1.2)
DEPRECATED RDW RBC AUTO: 15.1 % (ref 12.4–15.4)
GFR SERPLBLD CREATININE-BSD FMLA CKD-EPI: 31 ML/MIN/{1.73_M2}
GLUCOSE BLD-MCNC: 131 MG/DL (ref 70–99)
GLUCOSE BLD-MCNC: 134 MG/DL (ref 70–99)
GLUCOSE BLD-MCNC: 134 MG/DL (ref 70–99)
GLUCOSE BLD-MCNC: 165 MG/DL (ref 70–99)
GLUCOSE SERPL-MCNC: 140 MG/DL (ref 70–99)
HCT VFR BLD AUTO: 27.2 % (ref 36–48)
HGB BLD-MCNC: 8.8 G/DL (ref 12–16)
MCH RBC QN AUTO: 29.4 PG (ref 26–34)
MCHC RBC AUTO-ENTMCNC: 32.3 G/DL (ref 31–36)
MCV RBC AUTO: 91 FL (ref 80–100)
PERFORMED ON: ABNORMAL
PHOSPHATE SERPL-MCNC: 4 MG/DL (ref 2.5–4.9)
PLATELET # BLD AUTO: 263 K/UL (ref 135–450)
PMV BLD AUTO: 8.7 FL (ref 5–10.5)
POTASSIUM SERPL-SCNC: 4.5 MMOL/L (ref 3.5–5.1)
RBC # BLD AUTO: 2.98 M/UL (ref 4–5.2)
SODIUM SERPL-SCNC: 140 MMOL/L (ref 136–145)
WBC # BLD AUTO: 12.7 K/UL (ref 4–11)

## 2024-08-02 PROCEDURE — 6370000000 HC RX 637 (ALT 250 FOR IP): Performed by: INTERNAL MEDICINE

## 2024-08-02 PROCEDURE — 2500000003 HC RX 250 WO HCPCS: Performed by: HOSPITALIST

## 2024-08-02 PROCEDURE — 2580000003 HC RX 258: Performed by: INTERNAL MEDICINE

## 2024-08-02 PROCEDURE — 80069 RENAL FUNCTION PANEL: CPT

## 2024-08-02 PROCEDURE — 99223 1ST HOSP IP/OBS HIGH 75: CPT | Performed by: SURGERY

## 2024-08-02 PROCEDURE — 1200000000 HC SEMI PRIVATE

## 2024-08-02 PROCEDURE — 36415 COLL VENOUS BLD VENIPUNCTURE: CPT

## 2024-08-02 PROCEDURE — 85027 COMPLETE CBC AUTOMATED: CPT

## 2024-08-02 PROCEDURE — 6360000002 HC RX W HCPCS: Performed by: INTERNAL MEDICINE

## 2024-08-02 PROCEDURE — 74018 RADEX ABDOMEN 1 VIEW: CPT

## 2024-08-02 PROCEDURE — 82378 CARCINOEMBRYONIC ANTIGEN: CPT

## 2024-08-02 RX ORDER — DEXTROSE MONOHYDRATE, SODIUM CHLORIDE, AND POTASSIUM CHLORIDE 50; 1.49; 4.5 G/1000ML; G/1000ML; G/1000ML
INJECTION, SOLUTION INTRAVENOUS CONTINUOUS
Status: DISCONTINUED | OUTPATIENT
Start: 2024-08-02 | End: 2024-08-03

## 2024-08-02 RX ORDER — ACETAMINOPHEN 650 MG/1
650 SUPPOSITORY RECTAL EVERY 6 HOURS PRN
Status: DISCONTINUED | OUTPATIENT
Start: 2024-08-02 | End: 2024-08-22 | Stop reason: HOSPADM

## 2024-08-02 RX ORDER — SODIUM CHLORIDE 9 MG/ML
INJECTION, SOLUTION INTRAVENOUS PRN
Status: DISCONTINUED | OUTPATIENT
Start: 2024-08-02 | End: 2024-08-22 | Stop reason: HOSPADM

## 2024-08-02 RX ORDER — POLYETHYLENE GLYCOL 3350 17 G/17G
17 POWDER, FOR SOLUTION ORAL DAILY PRN
Status: DISCONTINUED | OUTPATIENT
Start: 2024-08-02 | End: 2024-08-22 | Stop reason: HOSPADM

## 2024-08-02 RX ORDER — MAGNESIUM SULFATE IN WATER 40 MG/ML
2000 INJECTION, SOLUTION INTRAVENOUS PRN
Status: DISCONTINUED | OUTPATIENT
Start: 2024-08-02 | End: 2024-08-02

## 2024-08-02 RX ORDER — SODIUM CHLORIDE 0.9 % (FLUSH) 0.9 %
5-40 SYRINGE (ML) INJECTION PRN
Status: DISCONTINUED | OUTPATIENT
Start: 2024-08-02 | End: 2024-08-22 | Stop reason: HOSPADM

## 2024-08-02 RX ORDER — ONDANSETRON 4 MG/1
4 TABLET, ORALLY DISINTEGRATING ORAL EVERY 8 HOURS PRN
Status: DISCONTINUED | OUTPATIENT
Start: 2024-08-02 | End: 2024-08-22 | Stop reason: HOSPADM

## 2024-08-02 RX ORDER — SODIUM CHLORIDE 0.9 % (FLUSH) 0.9 %
5-40 SYRINGE (ML) INJECTION EVERY 12 HOURS SCHEDULED
Status: DISCONTINUED | OUTPATIENT
Start: 2024-08-02 | End: 2024-08-22 | Stop reason: HOSPADM

## 2024-08-02 RX ORDER — DEXTROSE MONOHYDRATE 100 MG/ML
INJECTION, SOLUTION INTRAVENOUS CONTINUOUS PRN
Status: DISCONTINUED | OUTPATIENT
Start: 2024-08-02 | End: 2024-08-22 | Stop reason: HOSPADM

## 2024-08-02 RX ORDER — ONDANSETRON 2 MG/ML
4 INJECTION INTRAMUSCULAR; INTRAVENOUS EVERY 6 HOURS PRN
Status: DISCONTINUED | OUTPATIENT
Start: 2024-08-02 | End: 2024-08-22 | Stop reason: HOSPADM

## 2024-08-02 RX ORDER — ACETAMINOPHEN 325 MG/1
650 TABLET ORAL EVERY 6 HOURS PRN
Status: DISCONTINUED | OUTPATIENT
Start: 2024-08-02 | End: 2024-08-22 | Stop reason: HOSPADM

## 2024-08-02 RX ORDER — ENOXAPARIN SODIUM 100 MG/ML
30 INJECTION SUBCUTANEOUS DAILY
Status: DISCONTINUED | OUTPATIENT
Start: 2024-08-03 | End: 2024-08-04

## 2024-08-02 RX ORDER — POTASSIUM CHLORIDE 1500 MG/1
40 TABLET, EXTENDED RELEASE ORAL PRN
Status: DISCONTINUED | OUTPATIENT
Start: 2024-08-02 | End: 2024-08-02

## 2024-08-02 RX ORDER — POTASSIUM CHLORIDE 7.45 MG/ML
10 INJECTION INTRAVENOUS PRN
Status: DISCONTINUED | OUTPATIENT
Start: 2024-08-02 | End: 2024-08-02

## 2024-08-02 RX ORDER — GLUCAGON 1 MG/ML
1 KIT INJECTION PRN
Status: DISCONTINUED | OUTPATIENT
Start: 2024-08-02 | End: 2024-08-22 | Stop reason: HOSPADM

## 2024-08-02 RX ADMIN — SODIUM CHLORIDE, POTASSIUM CHLORIDE, SODIUM LACTATE AND CALCIUM CHLORIDE: 600; 310; 30; 20 INJECTION, SOLUTION INTRAVENOUS at 13:42

## 2024-08-02 RX ADMIN — PHENOL 1 SPRAY: 1.5 LIQUID ORAL at 00:49

## 2024-08-02 RX ADMIN — MEROPENEM 1000 MG: 1 INJECTION, POWDER, FOR SOLUTION INTRAVENOUS at 03:39

## 2024-08-02 RX ADMIN — ENOXAPARIN SODIUM 30 MG: 100 INJECTION SUBCUTANEOUS at 09:50

## 2024-08-02 RX ADMIN — HYDROMORPHONE HYDROCHLORIDE 0.5 MG: 1 INJECTION, SOLUTION INTRAMUSCULAR; INTRAVENOUS; SUBCUTANEOUS at 21:28

## 2024-08-02 RX ADMIN — MEROPENEM 500 MG: 500 INJECTION, POWDER, FOR SOLUTION INTRAVENOUS at 16:12

## 2024-08-02 RX ADMIN — POTASSIUM CHLORIDE, DEXTROSE MONOHYDRATE AND SODIUM CHLORIDE: 150; 5; 450 INJECTION, SOLUTION INTRAVENOUS at 23:52

## 2024-08-02 RX ADMIN — HYDROMORPHONE HYDROCHLORIDE 0.5 MG: 1 INJECTION, SOLUTION INTRAMUSCULAR; INTRAVENOUS; SUBCUTANEOUS at 13:46

## 2024-08-02 ASSESSMENT — PAIN SCALES - GENERAL
PAINLEVEL_OUTOF10: 0
PAINLEVEL_OUTOF10: 7
PAINLEVEL_OUTOF10: 7
PAINLEVEL_OUTOF10: 8

## 2024-08-02 ASSESSMENT — PAIN DESCRIPTION - LOCATION
LOCATION: BACK
LOCATION: BACK
LOCATION: THROAT

## 2024-08-02 ASSESSMENT — PAIN DESCRIPTION - ORIENTATION
ORIENTATION: MID
ORIENTATION: MID

## 2024-08-02 ASSESSMENT — PAIN DESCRIPTION - DESCRIPTORS
DESCRIPTORS: ACHING
DESCRIPTORS: DISCOMFORT

## 2024-08-02 ASSESSMENT — PAIN SCALES - WONG BAKER: WONGBAKER_NUMERICALRESPONSE: NO HURT

## 2024-08-02 NOTE — PLAN OF CARE
Transfer to [facility] from [place] approved.    64 yof n/v, ileus. CT with large duodenal mass. Ulcer in same area in the past that was positive for adenocarcinoma. Dr. Valle aware and requesting transfer to [facility] for further evaluation (please place consult as though surgery is not aware)    Hx of Wilms tumor s/p R nephrectomy  E coli UTI on Merrem  CKD at baseline

## 2024-08-02 NOTE — CARE COORDINATION
Chart reviewed day 3. Care managed by GI, onc and IM.  Path pending. Per GI note, patient and family are seeking aggressive treatment of colon mass. IF so, will likely need transfer to [facility] for surgical intervention and TPN. From skilled at [facility] plan to return when medically ready. Evelin García RN

## 2024-08-02 NOTE — CONSULTS
Consult for evaluate R heel callous.  Spoke to bed side RN (Sophia) and she reports no open wound just a callous.  NO NEED for wound care to see.  Will sign off.     Female

## 2024-08-02 NOTE — PROGRESS NOTES
A&Ox4. Denied chest pain/heaviness, /SOB. With patent and intact NGT at level 65, draining to brownish to greenish output.   SR x 2.   Call light within reach.   Bed on lowest position. Bed alarm on.

## 2024-08-02 NOTE — PLAN OF CARE
Problem: Safety - Adult  Goal: Free from fall injury  8/2/2024 1249 by Sophia Elkins RN  Outcome: Progressing    Problem: Chronic Conditions and Co-morbidities  Goal: Patient's chronic conditions and co-morbidity symptoms are monitored and maintained or improved  Outcome: Progressing

## 2024-08-02 NOTE — PROGRESS NOTES
Physical and Occupational Therapy    Attempted to see patient for therapy. Unable to see patient at this time. Pt refusing to participate. States her throat is sore and does not want to get up. Will continue to follow.  Ale Gonsalves, PT [phone]  Estrella Benavides, OTR/L

## 2024-08-02 NOTE — PROGRESS NOTES
PROGRESS NOTE    HPI: Agustina Fried is a(n)64 y.o. female admitted for work-up and treatment for Dehydration [E86.0]  Acute distention of stomach [K31.0]  Ileus (HCC) [K56.7]  Hypoglycemia [E16.2]  Gastric distention [K31.89]  Nausea vomiting and diarrhea [R11.2, R19.7].     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    Subjective:     No clinical changes.  Abdominal pain improved.  850 mL from NGT since yesterday.      Objective:     I/O last 3 completed shifts:  In: 0   Out: 850 [Emesis/NG output:850]      /67   Pulse 69   Temp 97.8 °F (36.6 °C) (Axillary)   Resp 16   Ht 1.6 m (5' 3\")   Wt 51.3 kg (113 lb)   SpO2 97%   BMI 20.02 kg/m²     Physical Exam:  HEENT: anicteric sclera, oropharyngeal membranes pink and moist. + NGT  Cor: RRR  Lungs: non-labored, no respiratory distress  Abdomen: non distended, soft, + mild mid abdominal tenderness  Extremities: no edema  Neuro: lethargic but easy to arouse, oriented x 3.      Results:   Lab Results   Component Value Date    ALT 19 07/30/2024    AST 18 07/30/2024     (H) 04/21/2024    ALKPHOS 265 (H) 07/30/2024    BILIDIR <0.2 04/21/2024    INR 1.03 04/23/2024    LIPASE 5.0 (L) 07/30/2024     Lab Results   Component Value Date    WBC 12.7 (H) 08/02/2024    HGB 8.8 (L) 08/02/2024    HCT 27.2 (L) 08/02/2024    MCV 91.0 08/02/2024     08/02/2024     BUN/Cr/glu/ALT/AST/amyl/lip:  24/1.8/--/--/--/--/-- (08/02 0518)  CT CHEST WO CONTRAST    Result Date: 7/31/2024  Trace bilateral pleural effusions. Patchy ground-glass opacity within the left lower lobe with superimposed micronodularity; correlate for pneumonitis.  Metastatic disease cannot be entirely excluded; continued attention on follow-up is suggested. Atherosclerosis, including coronary artery calcification. 1.5 cm hypodense right thyroid nodule, for which follow-up recommendations are as below. Mild fibrosis about the periphery of the upper lobes.

## 2024-08-02 NOTE — PROGRESS NOTES
Shift assessment completed.  Pt A&O, VSS. NG tube to CLWS. Pt had pulled NG partially out, NG advanced to 65cm, KUB ordered to verify placement. Bed alarm active for safety. Bed locked and in lowest position. Call light and bedside table within reach. Will continue to monitor.

## 2024-08-02 NOTE — CONSULTS
Oncology Hematology Care    Consult Note      Requesting Physician:  Carla Rodriguez CNP     CHIEF COMPLAINT:  duodenal mass      HISTORY OF PRESENT ILLNESS:    Agustina Fried is a 64 year old female with PMHX of colon cancer s/p colectomy 2023, bipolar disorder, CAD, HTN, HLD, T2DM, CHF, History of breast cancer, history of CVA who presented to [facility] ED on 07/30/2024 via EMS from [facility] for nausea, emesis, diarrhea for several days with rectal bleeding.   Admitted for further workup/evaluation. Found to have duodenal mass with obstruction.     Hem/onc consulted for evaluation of malignancy. History of noncompliance with malignancy follow up at [facility] outpatient.     Ms. Fried  is a 64 y.o. female we are seeing in consultation for     ICD-10-CM    1. Ileus (HCC)  K56.7       2. Hypoglycemia  E16.2       3. Dehydration  E86.0       4. Nausea vomiting and diarrhea  R11.2     R19.7       5. Gastric distention  K31.89       6. Gastric outlet obstruction  K31.1 Surgical Pathology     Surgical Pathology             Past Medical History:  Past Medical History:   Diagnosis Date    Abnormal brain MRI 7/20/2017    Partially empty sella and minimal chronic small vessel ischemic disease    Acute bilateral low back pain without sciatica 11/2/2016    SHARRON (acute kidney injury) ([facility]) 7/5/2017    Arthritis     back    Bipolar disorder ([facility]) 10/18/2008    CAD (coronary artery disease)     stent placed 6/8/20    Cancer ([facility]) 2015    bilateral breast:s/p lumpectomy/radiation:under care care of breast specialist:Dr. Boone     Carotid stenosis, bilateral:<50%:per US 7/2016 7/15/2016    Carpal tunnel syndrome 10/18/2008    Cervical cancer screening 2014    Nml per pt'.    Coronary artery disease of native artery of native heart with stable angina pectoris ([facility]) 6/9/2020    DDD (degenerative disc disease), lumbar 7/18/2018     OR    TUBAL LIGATION      UPPER GASTROINTESTINAL ENDOSCOPY N/A 1/29/2021    EGD BIOPSY performed by Silviano Pro MD at [facility] ENDOSCOPY    UPPER GASTROINTESTINAL ENDOSCOPY N/A 12/15/2022    EGD BIOPSY performed by Silviano Pro MD at [facility] ENDOSCOPY    UPPER GASTROINTESTINAL ENDOSCOPY N/A 4/25/2024    ESOPHAGOGASTRODUODENOSCOPY BIOPSY performed by Damian Riley DO at [facility] ENDOSCOPY    UPPER GASTROINTESTINAL ENDOSCOPY N/A 7/31/2024    ESOPHAGOGASTRODUODENOSCOPY BIOPSY performed by Donny Grant MD at [facility] ENDOSCOPY       Current Medications:  Current Facility-Administered Medications   Medication Dose Route Frequency Provider Last Rate Last Admin    meropenem (MERREM) 500 mg in sodium chloride 0.9 % 100 mL IVPB (mini-bag)  500 mg IntraVENous Q12H Yonatan Coates MD        phenol 1.4 % mouth spray 1 spray  1 spray Mouth/Throat Q2H PRN Yonatan Coates MD   1 spray at 08/02/24 0049    melatonin disintegrating tablet 10 mg  10 mg Oral Nightly Jamila Ernst, APRN - CNP   10 mg at 08/01/24 2125    0.9 % sodium chloride infusion   IntraVENous PRN Vale Fuller DO        bisacodyl (DULCOLAX) suppository 10 mg  10 mg Rectal BID Carla Rodriguez, APRN - CNP   10 mg at 08/01/24 1006    sodium chloride 0.9 % bolus 1,000 mL  1,000 mL IntraVENous Once Marlo Martin PA   Held at 07/30/24 1220    dextrose 10 % infusion  1,000 mL IntraVENous Continuous Brendon John  mL/hr at 07/30/24 1134 250 mL at 07/30/24 1134    lactated ringers IV soln infusion   IntraVENous Continuous Marina Oliva  mL/hr at 08/01/24 1938 New Bag at 08/01/24 1938    sodium chloride flush 0.9 % injection 5-40 mL  5-40 mL IntraVENous 2 times per day Marina Oliva MD   10 mL at 08/01/24 2125    sodium chloride flush 0.9 % injection 5-40 mL  5-40 mL IntraVENous PRN Marina Oliva MD        0.9 % sodium chloride infusion   IntraVENous PRN Marina Oliva MD        enoxaparin Sodium (LOVENOX)  not  definitively seen on prior.  Subtle clustered nodularity is also seen within  the superior segment of the left lower lobe.  A 4 mm anterior right upper  lobe pulmonary nodule is not significantly changed.  Trace bilateral pleural  effusions.  No pneumothorax.    Upper Abdomen: Nasogastric tube extends to the stomach where heterogeneous  intraluminal content is seen.  Streak artifact is seen from clip near the  right costophrenic angle.  Calcified granulomas within the liver.  Intrahepatic biliary ductal prominence again noted.    Soft Tissues/Bones: Nonspecific heterogeneous attenuation is again  demonstrated of lower thoracic vertebral bodies which appears chronic.  This  is seen as well within the lower cervical spine.  Mild heterogeneous  attenuation of the sternum is again seen.  Impression: Trace bilateral pleural effusions.    Patchy ground-glass opacity within the left lower lobe with superimposed  micronodularity; correlate for pneumonitis.  Metastatic disease cannot be  entirely excluded; continued attention on follow-up is suggested.    Atherosclerosis, including coronary artery calcification.    1.5 cm hypodense right thyroid nodule, for which follow-up recommendations  are as below.    Mild fibrosis about the periphery of the upper lobes.    Chronic heterogeneous attenuation of thoracic and cervical vertebral bodies,  as well as sternum, which could reflect metabolic bone disease or chronic  metastatic disease.    RECOMMENDATIONS:  Incidental right thyroid nodule measuring 1.5 cm. Recommend non-emergent  thyroid ultrasound.    Reference: J Am Tim Radiol. 2015 Feb;12(2): 143-50  [facility]  Patient: JOHAN ANGULO  MRN: [id number]  : 1960  Account: [id number]  Sex at Birth: Female  Age: 64 Years  Procedure: Upper GI endoscopy  Date: 2024  Attending Physician: SUSSY BRIZUELA  Indications:         - 65yo F with h/o malignant duodenal ulcer presents with N/V and gastric

## 2024-08-02 NOTE — PROGRESS NOTES
Hospital Medicine Progress Note      Date of Admission: 7/30/2024  Hospital Day: 4    Chief Admission Complaint:  \"Abdominal pain nausea and vomiting gastric\"     Subjective:  no new c/o    Presenting Admission History:         \"This is a 64-year-old female who was sent from SNF where she resides with chief complaint of ongoing nausea and vomiting with diarrhea over the last several days.  Patient has been complaining of abdominal pain and cramps.  As per nursing home report patient was noticed to have some blood per rectum.  Patient denies any coffee-ground vomiting and she feels like her stomach is swollen.     The ED workup notable for:  NG tube was placed in the emergency room to decompress status stomach.  General surgery consult requested by ED physician  As per general surgery recommendation:  Due to severe gastric distention of unclear etiology patient is to be admitted  NG tube inserted     Patient denies any shortness of breath no palpitation orthopnea dyspnea with exertion no diaphoresis.     Patient denies any muscle weakness paralysis or loss of sensation no blurry vision no double vision no neck stiffness.  No recent traveling no history of exposure to sick contacts     The workup in the emergency room notable for:  Laboratory workup significant for elevated creatinine 2.3 decreased GFR 23 patient CKD 3B     Glucose 50  Elevated troponin of 115, repeat troponin 107 slightly trending down  Elevated alk phos  CBC overall unremarkable except for chronic anemia H&H 8.1 25.3  History of recent E. coli positive UTI\"       Assessment/Plan:      Current Principal Problem:  Acute distention of stomach      Gastric Distention - likely 2nd to Duodenal Mass seen on EGD 31 July.  Heme/Onc consulted and appreciated - Plan to transfer to [facility] for Surg-Onc input. GI assistance appreciated.       UTI - admission U/A c/w acute cystitis.  Infection evidenced by U/A, Cx results and/or signs/sxs of

## 2024-08-03 ENCOUNTER — APPOINTMENT (OUTPATIENT)
Dept: GENERAL RADIOLOGY | Age: 64
End: 2024-08-03
Attending: SURGERY
Payer: MEDICAID

## 2024-08-03 LAB
ALBUMIN SERPL-MCNC: 2.2 G/DL (ref 3.4–5)
ANION GAP SERPL CALCULATED.3IONS-SCNC: 9 MMOL/L (ref 3–16)
BACTERIA UR CULT: ABNORMAL
BASOPHILS # BLD: 0.1 K/UL (ref 0–0.2)
BASOPHILS NFR BLD: 0.8 %
BUN SERPL-MCNC: 16 MG/DL (ref 7–20)
CALCIUM SERPL-MCNC: 8 MG/DL (ref 8.3–10.6)
CHLORIDE SERPL-SCNC: 104 MMOL/L (ref 99–110)
CO2 SERPL-SCNC: 25 MMOL/L (ref 21–32)
CREAT SERPL-MCNC: 1.3 MG/DL (ref 0.6–1.2)
DEPRECATED RDW RBC AUTO: 14.7 % (ref 12.4–15.4)
EOSINOPHIL # BLD: 0.1 K/UL (ref 0–0.6)
EOSINOPHIL NFR BLD: 0.6 %
GFR SERPLBLD CREATININE-BSD FMLA CKD-EPI: 46 ML/MIN/{1.73_M2}
GLUCOSE BLD-MCNC: 166 MG/DL (ref 70–99)
GLUCOSE BLD-MCNC: 180 MG/DL (ref 70–99)
GLUCOSE SERPL-MCNC: 237 MG/DL (ref 70–99)
HCT VFR BLD AUTO: 30 % (ref 36–48)
HGB BLD-MCNC: 9.6 G/DL (ref 12–16)
LYMPHOCYTES # BLD: 1.2 K/UL (ref 1–5.1)
LYMPHOCYTES NFR BLD: 12.2 %
MCH RBC QN AUTO: 29.1 PG (ref 26–34)
MCHC RBC AUTO-ENTMCNC: 32 G/DL (ref 31–36)
MCV RBC AUTO: 91.1 FL (ref 80–100)
MONOCYTES # BLD: 0.7 K/UL (ref 0–1.3)
MONOCYTES NFR BLD: 7.1 %
NEUTROPHILS # BLD: 7.7 K/UL (ref 1.7–7.7)
NEUTROPHILS NFR BLD: 79.3 %
ORGANISM: ABNORMAL
PERFORMED ON: ABNORMAL
PERFORMED ON: ABNORMAL
PHOSPHATE SERPL-MCNC: 2.3 MG/DL (ref 2.5–4.9)
PLATELET # BLD AUTO: 248 K/UL (ref 135–450)
PMV BLD AUTO: 9.7 FL (ref 5–10.5)
POTASSIUM SERPL-SCNC: 4.4 MMOL/L (ref 3.5–5.1)
RBC # BLD AUTO: 3.29 M/UL (ref 4–5.2)
SODIUM SERPL-SCNC: 138 MMOL/L (ref 136–145)
WBC # BLD AUTO: 9.7 K/UL (ref 4–11)

## 2024-08-03 PROCEDURE — 6360000002 HC RX W HCPCS: Performed by: STUDENT IN AN ORGANIZED HEALTH CARE EDUCATION/TRAINING PROGRAM

## 2024-08-03 PROCEDURE — 2580000003 HC RX 258: Performed by: HOSPITALIST

## 2024-08-03 PROCEDURE — 86301 IMMUNOASSAY TUMOR CA 19-9: CPT

## 2024-08-03 PROCEDURE — 36415 COLL VENOUS BLD VENIPUNCTURE: CPT

## 2024-08-03 PROCEDURE — 6360000002 HC RX W HCPCS: Performed by: HOSPITALIST

## 2024-08-03 PROCEDURE — 1200000000 HC SEMI PRIVATE

## 2024-08-03 PROCEDURE — 6370000000 HC RX 637 (ALT 250 FOR IP): Performed by: HOSPITALIST

## 2024-08-03 PROCEDURE — 6360000002 HC RX W HCPCS: Performed by: NURSE PRACTITIONER

## 2024-08-03 PROCEDURE — 99233 SBSQ HOSP IP/OBS HIGH 50: CPT | Performed by: SURGERY

## 2024-08-03 PROCEDURE — 85025 COMPLETE CBC W/AUTO DIFF WBC: CPT

## 2024-08-03 PROCEDURE — 74018 RADEX ABDOMEN 1 VIEW: CPT

## 2024-08-03 PROCEDURE — 80069 RENAL FUNCTION PANEL: CPT

## 2024-08-03 PROCEDURE — 2580000003 HC RX 258: Performed by: STUDENT IN AN ORGANIZED HEALTH CARE EDUCATION/TRAINING PROGRAM

## 2024-08-03 RX ORDER — INSULIN LISPRO 100 [IU]/ML
0-8 INJECTION, SOLUTION INTRAVENOUS; SUBCUTANEOUS EVERY 6 HOURS
Status: DISCONTINUED | OUTPATIENT
Start: 2024-08-03 | End: 2024-08-05

## 2024-08-03 RX ORDER — SODIUM CHLORIDE 9 MG/ML
INJECTION, SOLUTION INTRAVENOUS CONTINUOUS
Status: DISCONTINUED | OUTPATIENT
Start: 2024-08-03 | End: 2024-08-04

## 2024-08-03 RX ORDER — SODIUM CHLORIDE 0.9 % (FLUSH) 0.9 %
5-40 SYRINGE (ML) INJECTION PRN
Status: DISCONTINUED | OUTPATIENT
Start: 2024-08-03 | End: 2024-08-04

## 2024-08-03 RX ORDER — PANTOPRAZOLE SODIUM 40 MG/1
40 TABLET, DELAYED RELEASE ORAL
Status: DISCONTINUED | OUTPATIENT
Start: 2024-08-03 | End: 2024-08-04

## 2024-08-03 RX ORDER — CLONIDINE 0.1 MG/24H
1 PATCH, EXTENDED RELEASE TRANSDERMAL WEEKLY
Status: DISCONTINUED | OUTPATIENT
Start: 2024-08-03 | End: 2024-08-05

## 2024-08-03 RX ORDER — SODIUM CHLORIDE 9 MG/ML
INJECTION, SOLUTION INTRAVENOUS PRN
Status: DISCONTINUED | OUTPATIENT
Start: 2024-08-03 | End: 2024-08-04

## 2024-08-03 RX ORDER — INSULIN LISPRO 100 [IU]/ML
0-4 INJECTION, SOLUTION INTRAVENOUS; SUBCUTANEOUS NIGHTLY
Status: DISCONTINUED | OUTPATIENT
Start: 2024-08-03 | End: 2024-08-03 | Stop reason: DRUGHIGH

## 2024-08-03 RX ORDER — INSULIN LISPRO 100 [IU]/ML
0-8 INJECTION, SOLUTION INTRAVENOUS; SUBCUTANEOUS
Status: DISCONTINUED | OUTPATIENT
Start: 2024-08-03 | End: 2024-08-03

## 2024-08-03 RX ORDER — SODIUM CHLORIDE 0.9 % (FLUSH) 0.9 %
5-40 SYRINGE (ML) INJECTION EVERY 12 HOURS SCHEDULED
Status: DISCONTINUED | OUTPATIENT
Start: 2024-08-03 | End: 2024-08-04

## 2024-08-03 RX ADMIN — MEROPENEM 1000 MG: 1 INJECTION INTRAVENOUS at 17:24

## 2024-08-03 RX ADMIN — HYDROMORPHONE HYDROCHLORIDE 0.5 MG: 1 INJECTION, SOLUTION INTRAMUSCULAR; INTRAVENOUS; SUBCUTANEOUS at 05:14

## 2024-08-03 RX ADMIN — MEROPENEM 500 MG: 500 INJECTION, POWDER, FOR SOLUTION INTRAVENOUS at 04:56

## 2024-08-03 RX ADMIN — ENOXAPARIN SODIUM 30 MG: 100 INJECTION SUBCUTANEOUS at 07:52

## 2024-08-03 RX ADMIN — SODIUM CHLORIDE: 9 INJECTION, SOLUTION INTRAVENOUS at 13:13

## 2024-08-03 RX ADMIN — INSULIN LISPRO 6 UNITS: 100 INJECTION, SOLUTION INTRAVENOUS; SUBCUTANEOUS at 13:11

## 2024-08-03 ASSESSMENT — PAIN - FUNCTIONAL ASSESSMENT: PAIN_FUNCTIONAL_ASSESSMENT: ACTIVITIES ARE NOT PREVENTED

## 2024-08-03 ASSESSMENT — PAIN SCALES - GENERAL
PAINLEVEL_OUTOF10: 0
PAINLEVEL_OUTOF10: 9
PAINLEVEL_OUTOF10: 0

## 2024-08-03 ASSESSMENT — PAIN SCALES - WONG BAKER
WONGBAKER_NUMERICALRESPONSE: NO HURT
WONGBAKER_NUMERICALRESPONSE: NO HURT

## 2024-08-03 ASSESSMENT — PAIN DESCRIPTION - LOCATION: LOCATION: ABDOMEN;BACK

## 2024-08-03 ASSESSMENT — PAIN DESCRIPTION - DESCRIPTORS: DESCRIPTORS: SORE;DISCOMFORT

## 2024-08-03 ASSESSMENT — PAIN DESCRIPTION - ORIENTATION: ORIENTATION: LOWER;MID

## 2024-08-03 NOTE — PROGRESS NOTES
V2.0    [facility] Progress Note      Name:  Agustina Fried /Age/Sex: 1960  (64 y.o. female)   MRN & CSN:  [id number] & [id number] Encounter Date/Time: 8/3/2024 11:32 AM EDT   Location:  5319/5319-01 PCP: Viridiana Blanco APRN - NP     Attending:Victor M Almaguer MD       Hospital Day: 2    Assessment and Recommendations   Agustina Fried is a 64 y.o. 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      It does not look like she followed after her EGD results and she did not follow with oncology since . She was also recommended to have an EUS for evaluation of elevated alk phos and double ductal sign on imaging, but she has failed to follow-up numerous times      Pt was admitted - with acute CHF exacerbation and ESBL Ecoli UTI treated with meropenem. She presented to the ED  with  nausea, vomiting, diarrhea , abd pain and rectal bleeding. CT showed ileus with severe gastric distension. She was seen by general surgery and had NG. She was admitted to [facility]. Hb dropped from 8.1 to 6.9. She was seen by GI who did an EGD that showed duodenal malignancy resulting in obstruction . CT chest showed Patchy ground-glass opacity within the left lower lobe with superimposed  micronodularity; correlate for pneumonitis.  Metastatic disease cannot be entirely excluded. Chronic heterogeneous attenuation of thoracic and cervical vertebral bodies, as well as sternum, which could reflect metabolic bone disease or chronic metastatic disease.     Pt and family decided on

## 2024-08-03 NOTE — DISCHARGE SUMMARY
bladder is grossly unremarkable. Peritoneum/Retroperitoneum: There is no free air, free fluid or intraperitoneal inflammatory change.  There is no adenopathy. Bones/Soft Tissues: There is no acute fracture or aggressive osseous lesion.     Moderate ileus with severe gastric distension.     XR CHEST PORTABLE    Result Date: 7/30/2024  EXAMINATION: ONE XRAY VIEW OF THE CHEST 7/30/2024 10:33 am COMPARISON: 07/01/2024 HISTORY: ORDERING SYSTEM PROVIDED HISTORY: abd pain TECHNOLOGIST PROVIDED HISTORY: Reason for exam:->abd pain Reason for Exam: abd pain, diarrhea FINDINGS: Lung volumes are low.  Patient is rotated. Heart size normal.  Mediastinal contours normal.  Pulmonary vascularity is normal There is hazy opacity seen at the left lung base, and to a lesser extent the right lung base     Hazy opacity at the lung bases, either atelectasis or pneumonia.       Consults:     IP CONSULT TO GENERAL SURGERY  IP CONSULT TO GI  IP CONSULT TO ONCOLOGY  IP CONSULT TO PALLIATIVE CARE    Labs:     Recent Labs     08/01/24  0509 08/02/24  0518 08/03/24  0736   WBC 10.8 12.7* 9.7   HGB 10.0* 8.8* 9.6*   HCT 30.8* 27.2* 30.0*    263 248     Recent Labs     08/01/24  0509 08/02/24  0518 08/03/24  0736    140 138   K 4.0 4.5 4.4    102 104   CO2 27 22 25   BUN 22* 24* 16   CREATININE 1.6* 1.8* 1.3*   CALCIUM 8.4 8.1* 8.0*   PHOS 3.3 4.0 2.3*     No results for input(s): \"PROBNP\", \"TROPHS\" in the last 72 hours.  No results for input(s): \"LABA1C\" in the last 72 hours.  No results for input(s): \"AST\", \"ALT\", \"BILIDIR\", \"BILITOT\", \"ALKPHOS\" in the last 72 hours.  No results for input(s): \"INR\", \"LACTA\", \"TSH\" in the last 72 hours.    Urine Cultures:   Lab Results   Component Value Date/Time    LABURIN  07/31/2024 04:34 AM     >100,000 CFU/ml  CONTACT PRECAUTIONS INDICATED  Carbapenem antibiotics are the best option for infections caused  by ESBL producing organisms.  Other antibiotic classes are  likely to result in  treatment failure, even for organisms  demonstrating in vitro susceptibility.       Blood Cultures:   Lab Results   Component Value Date/Time    BC No Growth after 4 days of incubation. 04/17/2024 06:19 AM     Lab Results   Component Value Date/Time    BLOODCULT2 No Growth after 4 days of incubation. 04/17/2024 06:19 AM     Organism:   Lab Results   Component Value Date/Time    ORG Escherichia coli 07/31/2024 04:34 AM       Signed:    Yonatan Coates MD

## 2024-08-03 NOTE — CONSULTS
[facility]    Consult Note      Requesting Physician:      Victor M Almaguer MD    CHIEF COMPLAINT:      History of multiple cancers, complaints of generalized weakness and fatigue, lack of appetite.      HISTORY OF PRESENT ILLNESS:    Agustina Fried is a 64 year old female with PMHX of colon cancer s/p colectomy 2023, bipolar disorder, CAD, HTN, HLD, T2DM, CHF, History of breast cancer, history of CVA who presented to [facility] ED on 07/30/2024 via EMS from [facility] for nausea, emesis, diarrhea for several days with rectal bleeding.  EGD done on 8/1/2024 revealed large ulcerated mass in the second portion of the duodenum with retained food in stomach.  She is transferred to [facility] for surgical evaluation.      Past Medical History:  Past Medical History:   Diagnosis Date    Abnormal brain MRI 7/20/2017    Partially empty sella and minimal chronic small vessel ischemic disease    Acute bilateral low back pain without sciatica 11/2/2016    SHARRON (acute kidney injury) ([facility]) 7/5/2017    Arthritis     back    Bipolar disorder ([facility]) 10/18/2008    CAD (coronary artery disease)     stent placed 6/8/20    Cancer ([facility]) 2015    bilateral breast:s/p lumpectomy/radiation:under care care of breast specialist:Dr. Boone     Carotid stenosis, bilateral:<50%:per US 7/2016 7/15/2016    Carpal tunnel syndrome 10/18/2008    Cervical cancer screening 2014    Nml per pt'.    Coronary artery disease of native artery of native heart with stable angina pectoris (HCC) 6/9/2020    DDD (degenerative disc disease), lumbar 7/18/2018    Depression     under care of pschiatrist:Dr. Rojas    Depression/anxiety 7/5/2017    Depression/anxiety     Diabetes mellitus (HCC)     Gout     History of mammogram 10/28/2016;8/14/17    Negative    History of therapeutic radiation     Hyperlipidemia     Hypertension     Hypertensive heart and  mellitus (HCC)    Other secondary scoliosis, lumbosacral region    Thoracic spondylosis without myelopathy    Morbid obesity due to excess calories (HCC)    Age-related nuclear cataract of both eyes    Hypermetropia, bilateral    Hypertensive retinopathy, bilateral    Vitreous degeneration, bilateral    Acute bilateral low back pain with left-sided sciatica    History of CVA (cerebrovascular accident) without residual deficits    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 stage 3 chronic kidney disease (HCC)    S/P mastectomy, right    Acute cystitis without hematuria    Hypomagnesemia    Chest pain    CAD S/P percutaneous coronary angioplasty    Hyperglycemia due to diabetes mellitus (HCC)    Hx of heart artery stent    Nuclear senile cataract    Unintentional weight loss    Chronic anemia    Facial abscess    Asymptomatic bacteriuria    Acute encephalopathy    Tobacco use disorder    Severe malnutrition (HCC)    Acute right eye pain    Suspected condition    Bipolar affective disorder, currently depressed, moderate (HCC)    Seasonal allergies    Symptomatic bradycardia    Dyspnea    Diffuse pain    Hypoglycemia    Acquired absence of other specified parts of digestive tract    Atherosclerotic heart disease of native coronary artery without angina pectoris    Cognitive communication deficit    Hypertensive heart and chronic kidney disease with heart failure and stage 1 through stage 4 chronic kidney disease, or unspecified chronic kidney disease (HCC)    Muscle weakness (generalized)    Occlusion and stenosis of bilateral carotid arteries    Other lack of coordination    Repeated falls    Unspecified abnormalities of gait and mobility    Acute diarrhea    Hypertensive urgency    Acute renal failure superimposed on chronic kidney disease, unspecified acute renal failure type, unspecified CKD stage (HCC)    Atypical chest pain    Confusion    Chronic kidney disease    C.

## 2024-08-03 NOTE — CONSULTS
General Surgery   Resident Consult Note    Reason for Consult: \"obstructing duodenal cancer\"    History of Present Illness:   Agustina Fried is a 64 y.o. female with Hx of hypertension, diabetes, GERD, hyperlipidemia and CKD stage IIIb who presented as a direct admit from outside hospital with chief complaint of ongoing nausea, vomiting, diarrhea and abdominal pain/cramping over the last several days. Per notes, nursing home reported noticing some blood per rectum.    Initial ED workup was significant for moderate ileus with severe gastric distention on CT, dehydration and hypoglycemia.  ED placed NG tube decompression but only about 200 cc was removed. General surgery was consulted at outside hospital who recommended GI consult for further workup.    Surgical history is significant for Wilms tumor status post right nephrectomy (age 8), breast cancer status post radiation and lumpectomy (09/2014), invasive adenocarcinoma, moderately differentiation with negative margins and 24 benign LNs  status post right colectomy (03/2023) and anemia with EGD last performed April 2024 demonstrating a duodenal ulcer showing intramucosal adenocarcinoma with surface ulceration. She had been recommended that she undergo colonoscopy for anemia as well as EUS for evaluation of elevated alkaline phosphatase and double ductal sign on imaging but she has failed to follow-up numerous times.    EGD was performed on 731 by GI at outside hospital and found large ulcerated mass in the second portion of duodenum with large amount of retained food in the stomach, biopsies obtained.  Heme-onc was also consulted at this time.  CEA is elevated at 50.5 from 20.5 in December 2022.  CT chest on 731 showing patchy groundglass opacity within the left lower lobe with superimposed micro nodularity, metastatic disease cannot be excluded; chronic heterogeneous attenuation of thoracic and cervical vertebral bodies and sternum which could reflect metabolic  effort with no accessory muscle use on 2L NC  Cardiovascular: RRR, well perfused  Abdomen: Soft, mildly-distended, non-tender, no rebound, guarding, or rigidity. Well-healed midline ex lap scar; NGT w minimal gastric output  Skin: Warm and dry, no rashes  Extremities: No edema, no cyanosis  Neuro: A&Ox3, no focal deficits, sensation intact    Labs:    CBC:   Recent Labs     07/31/24  0550 07/31/24  1624 08/01/24  0509 08/02/24  0518   WBC 8.6  --  10.8 12.7*   HGB 6.9* 9.9* 10.0* 8.8*   HCT 21.3* 30.5* 30.8* 27.2*   MCV 92.3  --  90.3 91.0     --  270 263     BMP:   Recent Labs     07/31/24  0550 08/01/24  0509 08/02/24  0518    139 140   K 4.2 4.0 4.5    101 102   CO2 25 27 22   PHOS  --  3.3 4.0   BUN 22* 22* 24*   CREATININE 1.6* 1.6* 1.8*     PT/INR: No results for input(s): \"PROTIME\", \"INR\" in the last 72 hours.  APTT: No results for input(s): \"APTT\" in the last 72 hours.  Liver Profile:   Lab Results   Component Value Date/Time    AST 18 07/30/2024 10:03 AM    ALT 19 07/30/2024 10:03 AM    BILIDIR <0.2 04/21/2024 04:29 AM    BILITOT 0.3 07/30/2024 10:03 AM    ALKPHOS 265 07/30/2024 10:03 AM     04/21/2024 04:29 AM    PROTEIN 7.8 07/30/2024 10:03 AM    PROTEIN 6.9 07/01/2016 12:00 AM     Lab Results   Component Value Date/Time    CHOL 154 11/27/2023 07:00 PM    HDL 75 11/27/2023 07:00 PM    TRIG 89 11/27/2023 07:00 PM     UA:   Lab Results   Component Value Date/Time    NITRITE negative 01/06/2013 12:11 AM    COLORU Yellow 07/31/2024 04:34 AM    PHUR 6.0 07/31/2024 04:34 AM    PHUR 5.5 04/18/2024 04:40 PM    WBCUA 21-50 07/31/2024 04:34 AM    RBCUA 5-10 07/31/2024 04:34 AM    YEAST Present 12/10/2023 02:53 PM    BACTERIA 4+ 07/31/2024 04:34 AM    CLARITYU SL CLOUDY 07/31/2024 04:34 AM    SPECGRAV 1.010 01/06/2013 12:11 AM    LEUKOCYTESUR TRACE 07/31/2024 04:34 AM    UROBILINOGEN 0.2 07/31/2024 04:34 AM    BILIRUBINUR Negative 07/31/2024 04:34 AM    BLOODU MODERATE 07/31/2024 04:34 AM

## 2024-08-03 NOTE — CONSULTS
Comprehensive Nutrition Assessment    RECOMMENDATIONS:  Nutrition Support:  Day 1: Start PN Clinimix 5/15 at goal rate 41.7 ml/hr.     Day 2: Advance PN Clinimix 5/20 at goal rate 55 ml/hr.    Day 3: Advance PN Clinimix 5/20 at goal rate 70 ml/hr.   Start 250 ml of 20% lipids twice weekly on 8/5 if remains NPO  Obtain Labs: Daily Phos,Mg,K+. Weekly TG recommended.   Pharmacy to adjust electrolytes, MVI and Trace Elements as appropriate.  PO Diet: NPO  Nutrition Education: Education not indicated     NUTRITION ASSESSMENT:   Nutritional summary & status: Consult for TPN recommendations. Admitted from SNF then outside hospital with n/v/c/d for several days PTA. EGD showed a large ulcerated mass in the second portion of duodenum with gastric outlet obstruction symptoms including a large amount of retained food in the stomach. Significant 9% wt loss in 1 month (139#) PTA (CBW appears 127#, most recnet standing scale wt appears an outlier). Plan to f/u on EGD biopsies, NGT to suction, TPN for nutrition. TPN recommendations above. Will follow up and monitor ability to utilize post pylolric feeds via Jtube vs TPN vs PO intake.     Admission // PMH: Duodenal mass // hypertension, diabetes, GERD, hyperlipidemia and CKD stage IIIb    MALNUTRITION ASSESSMENT  Context of Malnutrition: Chronic Illness   Malnutrition Status: At risk for malnutrition (significant wt loss)  Findings of the 6 clinical characteristics of malnutrition (Minimum of 2 out of 6 clinical characteristics is required to make the diagnosis of moderate or severe Protein Calorie Malnutrition based on AND/ASPEN Guidelines):  Energy Intake:  Mild decrease in energy intake  (prior to admit, timeframe unknown)  Weight Loss:  Greater than 5% over 1 month (9.5% in 1 month)     Body Fat Loss:  Unable to assess     Muscle Mass Loss:  Unable to assess        NUTRITION DIAGNOSIS   Inadequate oral intake related to altered GI structure as evidenced by weight loss greater  than or equal to 5% in 1 month, nutrition support - parenteral nutrition, poor intake prior to admission (9% in 1 month)    Nutrition Monitoring and Evaluation:   Food/Nutrient Intake Outcomes:  Diet Advancement/Tolerance, Parenteral Nutrition Intake/Tolerance  Physical Signs/Symptoms Outcomes:  Biochemical Data, Nutrition Focused Physical Findings, Skin, Weight, GI Status     OBJECTIVE DATA: Significant to nutrition assessment  Nutrition Related Findings:   Wounds: None  Nutrition Goals: Initiate nutrition support, by next RD assessment     CURRENT NUTRITION THERAPIES  Current Parenteral Nutrition Orders:  Type and Formula: 2-in-1 Standard   Lipids: 250ml, Two times weekly  Duration: Continuous  Goal PN Orders Provides: Clinimix 5/20 @ 70 ml/hr [id number] kcal, 84 g protein, 1680 TV. GIR = 4.0  PO Intake: NPO   PO Supplement Intake:NPO  Additional Sources of Calories/IVF:d5 and NaCl w/ KCl providing 90 kcal     COMPARATIVE STANDARDS  Energy (kcal):  1523-1465 (27-29 kcal/kg CBW)     Protein (g):  75-87 (1.3-1.5 g/kg CBW)       Fluid (ml/day):  1 ml/kcal    ANTHROPOMETRICS  Current Height: 160 cm (5' 2.99\")  Current Weight - Scale: 58 kg (127 lb 13.9 oz)    Admission weight: 58 kg (127 lb 13.9 oz)    The patient will be monitored per nutrition standards of care. Consult dietitian if additional nutrition interventions are needed prior to RD reassessment.     Jossie Mullins RD  [place]:  [phone]

## 2024-08-03 NOTE — PROGRESS NOTES
Surgery Daily Progress Note      CC: Duodenal mass    SUBJECTIVE:  Patient denies pain this AM. Distension improved this AM. No acute complaints. States son is her POA and she would like to continue with care if there is other procedures or surgery to be had for the duodenal mass.    ROS:   A 14 point review of systems was conducted, significant findings as noted above. All other systems negative.    OBJECTIVE:    PHYSICAL EXAM:  Vitals:    08/03/24 0514 08/03/24 0544 08/03/24 0615 08/03/24 0754   BP:   (!) 186/72 (!) 184/66   Pulse:   64 63   Resp: 18 17 17 17   Temp:   97.9 °F (36.6 °C) 98.6 °F (37 °C)   TempSrc:   Oral Oral   SpO2:   97% 98%   Weight:       Height:           General appearance: Alert, no acute distress, grooming appropriate  Eyes: No scleral icterus, EOM grossly intact  Neck: Trachea midline, no JVD  Chest/Lungs: Normal effort with no accessory muscle use on 2L NC  Cardiovascular: RRR, well perfused  Abdomen: Soft, mildly-distended, non-tender, no rebound, guarding, or rigidity. Well-healed midline ex lap scar; NGT gastric output  Skin: Warm and dry, no rashes  Extremities: No edema, no cyanosis  Neuro: A&Ox3, no focal deficits, sensation intact    ASSESSMENT & PLAN:   This is a 64 y.o. female with Hx of HTN, DM, GERD, hyperlipidemia, CKD stage IIIb and surgical history significant for Wilms tumor s/p right nephrectomy (age 8), breast cancer s/p radiation and lumpectomy (09/2014), invasive adenocarcinoma s/p R colectomy (03/2023) who presented with gastric outlet obstruction symptoms due to large ulcerated mass in the second portion of duodenum.     - GI consult for evaluation for colonoscopy given rectal bleeding and possible need for duodenal stent  - Follow-up EGD biopsies  - Continue NGT decompression; recommend giving can of soda, clamping NGT for 2 hours then placing back to suction to break up retained food, may need kreon in future if retained food is not digested.  - Continue IVF  maintenance fluid, PPI  - Continue PICC/TPN  - Will review imaging and order further modalities as needed for further characterization of the mass  - Rest of care per primary team    Brent Carlos DO PGY-4  General Surgery  08/03/24  10:55 AM    I have personally performed the medical history, physical exam and medical decision making and agree with all pertinent clinical information unless otherwise noted.     Discussed with GI about EGD and colonoscopy  Overall difficulty with management discussed with family    Julio Valle MD  Surgery Attending

## 2024-08-03 NOTE — PROGRESS NOTES
Pt discharged via EMS, money from security accounted for and given to pt along with their belongings. Paperwork given to EMS and signed out by Jamila Ernst NP. Pt discharged in stable condition with no further needs. Electronically signed by Georgina Wise RN on 8/2/2024 at 10:07 PM

## 2024-08-03 NOTE — PROGRESS NOTES
Phone call received from transfer center regarding pt transfer to [facility].     Room# 4425  Call report to: [phone]  Transport Service: [facility] @ [id number]

## 2024-08-03 NOTE — CONSULTS
TPN has been discontinued. Pharmacy will sign off consult. If medication dosing is resumed, please re-consult pharmacy.    Daylin JeffersD., BCPS   8/3/2024 3:37 PM  Wireless: [phone]       Thalidomide Counseling: I discussed with the patient the risks of thalidomide including but not limited to birth defects, anxiety, weakness, chest pain, dizziness, cough and severe allergy.

## 2024-08-03 NOTE — PROGRESS NOTES
/69. Hospitalist Graciela made aware. No new orders at this time. Electronically signed by Kiara Gresham RN on 8/2/2024 at 11:57 PM

## 2024-08-03 NOTE — PROGRESS NOTES
Pharmacist Review and Automatic Dose Adjustment of Prophylactic Enoxaparin    The reviewing pharmacist has made an adjustment to the ordered enoxaparin dose or converted to UFH per the approved [facility] protocol and table as defined below.    Plan / Rationale: Based upon the patient's weight and renal function, the ordered dose of Enoxaparin 40 mg daily has been converted to Enoxaparin 30 mg daily.    Thank you,  Lauren Salmon, [facility]  8/2/2024, 11:08 PM      Agustina Freid is a 64 y.o. female.     Recent Labs     07/31/24  0550 08/01/24  0509 08/02/24  0518   CREATININE 1.6* 1.6* 1.8*       Estimated Creatinine Clearance: 25 mL/min (A) (based on SCr of 1.8 mg/dL (H)).    Recent Labs     08/01/24  0509 08/02/24  0518   HGB 10.0* 8.8*   HCT 30.8* 27.2*    263     No results for input(s): \"INR\" in the last 72 hours.    Height:   Ht Readings from Last 1 Encounters:   07/30/24 1.6 m (5' 3\")     Weight:  Wt Readings from Last 1 Encounters:   08/01/24 51.3 kg (113 lb)

## 2024-08-03 NOTE — PROGRESS NOTES
4 Eyes Skin Assessment     NAME:  Agustina Fried  YOB: 1960  MEDICAL RECORD NUMBER:  [id number]    The patient is being assessed for  Admission    I agree that at least one RN has performed a thorough Head to Toe Skin Assessment on the patient. ALL assessment sites listed below have been assessed.      Areas assessed by both nurses:    Head, Face, Ears, Shoulders, Back, Chest, Arms, Elbows, Hands, Sacrum. Buttock, Coccyx, Ischium, Legs. Feet and Heels, Under Medical Devices , and Other          Does the Patient have a Wound? No noted wound(s)       Maco Prevention initiated by RN: Yes  Wound Care Orders initiated by RN: No    Pressure Injury (Stage 3,4, Unstageable, DTI, NWPT, and Complex wounds) if present, place Wound referral order by RN under : No    New Ostomies, if present place, Ostomy referral order under : No     Nurse 1 eSignature: Electronically signed by Kiara Gresham RN on 8/3/24 at 2:28 AM EDT    **SHARE this note so that the co-signing nurse can place an eSignature**    Nurse 2 eSignature: Electronically signed by RENU MULLEN RN on 8/3/24 at 3:22 AM EDT

## 2024-08-03 NOTE — CONSULTS
[facility] [place]  GI Consultation                                                                 Patient: Agustina Fried  : 1960       Date:  8/3/2024    Subjective:       History of Present Illness  Patient is a 64 y.o.  female with medical history of heart failure with preserved ejection fraction, history of right breast cancer s/p lumpectomy and radiation in , CKD 3B, type II DM, hypertension, hyperlipidemia, wilms tumor status post right nephrectomy in , colon cancer s/p right colectomy in 2023  who was transferred from [facility] to [facility] on 24 for surgical oncology evaluation.      Patient presented from [facility] to [facility] on  with complaints of persistent nausea, nonbilious and nonbloody emesis and diarrhea, abdominal pain/cramps for about 2 weeks prior to presentation. [facility] reports some blood per rectum. Patient denies coffee ground emesis but reported distended abdomen.    CT abdomen and pelvis showed moderate ileus with severe gastric distention. NG tube was placed for decompression, Gen surg was consulted and recommended GI consult. She had an EGD on  that showed large ulcerated mass in the second portion of duodenum with large amount of retained food in the stomach, biopsies were obtained. She also had a drop in Hb from 8.1 to 6.9 and received 1 unit of pRBC. CT chest  showed chronic heterogeneous attenuation of thoracic and cervical vertebral bodies, as well as sternum, which could reflect metabolic bone disease or chronic metastatic disease.    She had been recommended to undergo endoscopic evaluation for anemia and elevation of ALP and double duct sign on imaging but failed to follow up multiple times.     She is seen in consult for \"new duodenal mass, hx of colon cancer, needs colonoscopy and eval for possible duodenal stent if needed in future\"     Family wants agressive care  and [facility] was consulted at [facility]. Patient is  member was nml.    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 myelopathy or radiculopathy 3/10/2017    Transient cerebral ischemia 07/15/2016    TIA:7/10/16    Unspecified cerebral artery occlusion with cerebral infarction     TIA      Past Surgical History:   Procedure Laterality Date    BREAST LUMPECTOMY  2015    Bilateral:breast cancer    CARDIAC CATHETERIZATION  06/08/2020    Dr. Marcano([facility]), DAYANA to Diag 1    COLONOSCOPY N/A 2/1/2021    COLONOSCOPY DIAGNOSTIC performed by Silviano Pro MD at [facility] ENDOSCOPY    COLONOSCOPY N/A 2/27/2023    COLONOSCOPY WITH BIOPSY performed by Silviano Pro MD at [facility] ENDOSCOPY    COLONOSCOPY N/A 4/25/2024    COLONOSCOPY DIAGNOSTIC performed by Damian Riley DO at [facility] ENDOSCOPY    CT BONE MARROW BIOPSY  2/3/2021    CT BONE MARROW BIOPSY 2/3/2021 Jamila Cooney MD [facility] CT SCAN    HEMICOLECTOMY N/A 3/7/2023    ROBOTIC CONVERTED TO OPEN RIGHT COLECTOMY performed by Jeffrey Quintero MD at [facility] OR    HYSTERECTOMY (CERVIX STATUS UNKNOWN)      Benign:no cervical cancer per pt'    KIDNEY REMOVAL      right    OTHER SURGICAL HISTORY Right     orif right ankle    TEMPORAL ARTERY BIOPSY Right 8/9/2021    RIGHT TEMPORAL ARTERY BIOPSY LIGATION performed by Byron Landry MD at [facility] OR    TUBAL LIGATION      UPPER GASTROINTESTINAL ENDOSCOPY N/A 1/29/2021    EGD BIOPSY performed by Silviano Pro MD at [facility] ENDOSCOPY    UPPER GASTROINTESTINAL ENDOSCOPY N/A 12/15/2022    EGD BIOPSY performed by Silviano Pro MD at [facility] ENDOSCOPY    UPPER GASTROINTESTINAL ENDOSCOPY N/A 4/25/2024    ESOPHAGOGASTRODUODENOSCOPY BIOPSY performed by Damian Riley DO at [facility] ENDOSCOPY    UPPER GASTROINTESTINAL ENDOSCOPY N/A 7/31/2024    ESOPHAGOGASTRODUODENOSCOPY BIOPSY performed by Donny Grant MD at [facility] ENDOSCOPY        Admission Meds  No current facility-administered medications on file prior to encounter.     Current Outpatient Medications on File  tablet by mouth every morning      calcium carbonate-vitamin D (CALTRATE) 600-400 MG-UNIT TABS per tab Take 1 tablet by mouth daily           Allergies  Allergies   Allergen Reactions    Morphine Anaphylaxis and Hives     feels like throat is closing    Penicillins Hives and Swelling    Codeine Hives and Rash      Social   Social History     Tobacco Use    Smoking status: Former     Current packs/day: 0.00     Average packs/day: 0.5 packs/day for 20.0 years (10.0 ttl pk-yrs)     Types: Cigarettes, Cigars     Start date: 7/3/1994     Quit date: 7/3/2014     Years since quitting: 10.0    Smokeless tobacco: Never   Substance Use Topics    Alcohol use: No     Alcohol/week: 0.0 standard drinks of alcohol        Family History   Problem Relation Age of Onset    Cancer Mother         breast    Cancer Father     Heart Failure Neg Hx     High Cholesterol Neg Hx     Hypertension Neg Hx     Migraines Neg Hx     Rashes/Skin Problems Neg Hx     Seizures Neg Hx     Stroke Neg Hx     Thyroid Disease Neg Hx     Diabetes Neg Hx         Review of Systems  Pertinent items are noted in HPI.       Physical Exam    BP (!) 184/66   Pulse 63   Temp 98.6 °F (37 °C) (Oral)   Resp 17   Ht 1.6 m (5' 2.99\")   Wt 58 kg (127 lb 13.9 oz)   SpO2 98%   BMI 22.66 kg/m²   General appearance: alert, cooperative, no distress, appears stated age, NG tube in place, oriented to self and place.   Anicteric, No Jaundice  Head: Normocephalic, without obvious abnormality  Lungs: clear to auscultation bilaterally, Normal Effort  Heart: regular rate and rhythm, normal S1 and S2, no murmurs or rubs  Abdomen: abnormal findings:  hypoactive bowel sounds , soft, non-tender, no guarding   Extremities: atraumatic, no cyanosis or edema  Skin: warm and dry  Neuro: intact  AAOX3      Data Review:    Recent Labs     08/01/24  0509 08/02/24  0518 08/03/24  0736   WBC 10.8 12.7* 9.7   HGB 10.0* 8.8* 9.6*   HCT 30.8* 27.2* 30.0*   MCV 90.3 91.0 91.1    263 248

## 2024-08-03 NOTE — H&P
History and Physical      Name:  Agustina Fried /Age/Sex: 1960  (64 y.o. female)   MRN & CSN:  [id number] & [id number] Encounter Date/Time: 2024 8:15 PM   Location:  5319/5319-01 PCP: Viridiana Blanco APRN - NP            Assessment and Plan:     Patient is a 64-year-old female who presented with diarrhea. Transferred from [facility].    # Duodenal adenocarcinoma  # Ileus   - Presented from SNF for persistent nausea, NBNB emesis and diarrhea for few days on . CT on admission showed moderate ileus with severe gastric distention. NTG placed on . EGD on  showed large ulcerated mass in second portion of duodenum with large amount of retained food in stomach. Transferred to [facility] on  for surgical evaluation.   - Surgical oncology, oncology and palliative care consulted, appreciate assistance. Consider placement of PICC for the TPN soon, gentle D5 gtt for now.      # Hx of right breast cancer in 2014  # Hx of Wilms tumor s/p right nephrectomy in     # Acute cystitis with hematuria  - UCx on  showing ESBL E coli.   - Started on Merrem on , continue.    # HFpEF, compensated  - Last TTE in 2024 with LVEF of 55-60%.   - Clinically hypovolemic.   - Hold Demadex, monitor volume status closely.    # Acute on chronic anemia  - Received x1u pRBC on  for Hg of 6.9 with appropriate response.   - Monitor labs.     # CKD3b  - Labs stable, avoid nephrotoxic medications.    # T2DM with hyperglycemia  - Last A1c 5.4% in 2024.  - Hold home Lantus for now.      Checklist:  Advanced care planning: full  Diet: NPO for now   DVT ppx: Lovenox  Sugar: BG goal of 140-180 while inpatient    Disposition: admit to inpatient.  Estimated discharge: 4-5 day(s).  Current living situation: SNF.  Expected disposition: SNF.    Personally reviewed lab studies and imaging.  EKG interpreted personally and results as stated above.  Imaging that was interpreted personally and results as stated above.    History of  Present Illness:     Chief Complaint: diarrhea    Patient is a 64-year-old female with a PMHx of HFpEF, CAD, HTN, HLD, T2DM, CKD3b, normocytic anemia, right breast cancer in 9/2014, Wilms tumor s/p right nephrectomy 1968, and anxiety who presented to the ED with SNF for persistent nausea, NBNB emesis and diarrhea for few days on 7/30. CT on admission showed moderate ileus with severe gastric distention. NTG placed on 7/30. EGD on 8/1 showed large ulcerated mass in second portion of duodenum with large amount of retained food in stomach. Transferred to [facility] on 8/2 for surgical evaluation. Hx otherwise is limited as patient did not provide significant details. No fevers, SOB, CP, active emesis or current abdominal pain.    History obtained from: patient.    ROS:     Pertinent positives and negatives discussed in HPI above.    Objective:       Intake/Output Summary (Last 24 hours) at 8/2/2024 2015  Last data filed at 8/2/2024 1613  Gross per 24 hour   Intake 7618.34 ml   Output 850 ml   Net 6768.34 ml        Vitals: There were no vitals filed for this visit.  BMI: There is no height or weight on file to calculate BMI.  General: Awake.     HEENT: PERRLA. Vision grossly intact. Normal hearing. Oropharynx clear. Dry MM.  Neck: Supple. No JVD.   CV: RRR. NL S1/S2. No BLE edema.   Pulm: NL effort on RA. CTAB.   GI: +BS x4. Soft. NT/ND. NTG in place, draining bilious output.  : No CVA tenderness. No Dooley catheter.  Skin: Intact, warm and dry.  MSK: No gross joint deformities. Full ROM.   Neuro: A&Ox place and time. CNs grossly intact. Normal speech. No focal deficit.   Psych: Calm.    Past History:     PMHx:   Past Medical History:   Diagnosis Date    Abnormal brain MRI 7/20/2017    Partially empty sella and minimal chronic small vessel ischemic disease    Acute bilateral low back pain without sciatica 11/2/2016    SHARRON (acute kidney injury) (HCC) 7/5/2017    Arthritis     back    Bipolar disorder ([facility]) 10/18/2008    CAD  ORG Escherichia coli ESBL 07/31/2024 04:34 AM       Radiology results:  No orders to display       Sal Quigley MD  08/02/24 8:15 PM

## 2024-08-03 NOTE — PROGRESS NOTES
/72. Pt NG moved out of place. Readvanced and securement device placed. NG clamped. Hospitalist Soraida made aware of assessment findings. KUB ordered.

## 2024-08-03 NOTE — CONSULTS
[facility] -  Clinical Pharmacy Note    TPN - Management by Pharmacy    Consult Date(s): 8/3  Consulting Provider(s): Dr CARLOS Owusu    Assessment / Plan  Gastric outlet obstruction 2/2 ulcerated mass - Parenteral Nutrition  Access: PICC team coming in to place access  Day of Therapy: 1  Formulation:  Clinimix E 5/15  Clinimix Rate:  41.7 mL/hr (Total volume = 1000 mL/day)  Lipids:  Start on Mon 8/5 per RD -- Lipids 20% 250mL over 12 hours on Mon & Thurs only (due to national shortage)  Appreciate Clinical Dietitian's recommendations for formulation and goal rate.  Will advance stepwise per clinical RD note today:   Day 2: advance to Clinimix 5/20 at 55 mL/hr  Day 3: advance to Clinimix 5/20 to goal 70 mL/hr  Electrolytes:  Patient with improving SHARRON.  UOP ~0.5 mL/kg/hr in the past 24h. Will proceed with E bag today, and watch electrolytes closely.    Clinimix-E includes standard electrolyte formulation.  Recommend further repletion with supplemental IVPBs as needed.  Maintenance IVF:  D5/0.45 with 20mEq/L KCl @ 75 mL/hr  PS sent to Hospitalist; now stopped  Glucose control:  BG had ranged 131-165, but after dextrose-containing fluids started, .  Will recommend SS to be added, and will monitor as TPN starts and advances.     Add MVI 10 mL/day in PN on Mon-Wed-Fri only (due to national shortage) & Trace Elements 1 mL/day in PN daily.  Daily renal panel, daily magnesium, and weekly triglycerides ordered per protocol.  PN will be re-ordered daily.    Thank you for consulting pharmacy,    Daylin Rojas PharmD., BCPS   8/3/2024 11:19 AM  Wireless: [phone]        Interval update:  therapy initiation     Subjective/Objective:   Agustina Fried is a 64 y.o. female with a PMHx significant for CAD, HTN, HLP, HFpEF, T2D, TIA, CKD stage 3, Wilms tumor s/p remote R nephrectomy, anemia, breast cancer, invasive adenocarcinoma s/p R colectomy (3/2023) bipolar dx, depression, anxiety, gout, hx pancreatitis, who is admitted  as a transfer from [facility] with gastric outlet obstruction 2/2 large ulcerated mass in duodenum.  No acute surgical intervention per Surgery at this time.  NG tube to decompression.      Pharmacy is consulted to initiate TPN.    Ht Readings from Last 1 Encounters:   08/02/24 1.6 m (5' 2.99\")     Wt Readings from Last 1 Encounters:   08/02/24 58 kg (127 lb 13.9 oz)     Recent Labs     08/02/24  0518 08/03/24  0736    138   K 4.5 4.4    104   CO2 22 25   PHOS 4.0 2.3*   GLUCOSE 140* 237*   BUN 24* 16   CREATININE 1.8* 1.3*   CALCIUM 8.1* 8.0*     Albumin   Date Value Ref Range Status   08/03/2024 2.2 (L) 3.4 - 5.0 g/dL Final     Corrected Calcium: 9.4 mg/dL    Recent Labs     08/02/24  0808 08/02/24  1153 08/02/24  1630 08/02/24  2128   POCGLU 131* 134* 165* 134*     Sliding scale insulin used since PN bag hung yesterday: N/A    Recent Labs     08/02/24  0518 08/03/24  0736   WBC 12.7* 9.7   HGB 8.8* 9.6*    248     Triglycerides   Date Value Ref Range Status   11/27/2023 89 0 - 150 mg/dL Final   09/21/2023 90 0 - 150 mg/dL Final   09/27/2022 249 (H) 0 - 150 mg/dL Final   11/03/2021 62 0 - 150 mg/dL Final   06/25/2020 118 0 - 150 mg/dL Final

## 2024-08-03 NOTE — PROGRESS NOTES
Patient admitted to room 5319 from [facility]. Patient is A&O x 4. VSS. Patient oriented to the room all safety measures in place. Patient given IS and SCDs at this time. Admission orders released and patient 4 eyes completed. Admission documentation completed. No other needs are noted at this time.    [x] Bed alarm on and cord plugged into wall  [x] Bed in lowest position  [x] Call light and bedside table within reach  [x] Patient educated on all safety measures  []Oxygen connected to wall (if applicable)     Nurse 1 Esignature: Electronically signed by Kiara Gersham, RN on 8/3/24 at 2:29 AM EDT  Nurse 2 Esignature: Electronically signed by RENU MULLEN, RN on 8/3/24 at 3:22 AM EDT

## 2024-08-04 ENCOUNTER — APPOINTMENT (OUTPATIENT)
Dept: GENERAL RADIOLOGY | Age: 64
End: 2024-08-04
Attending: SURGERY
Payer: MEDICAID

## 2024-08-04 ENCOUNTER — APPOINTMENT (OUTPATIENT)
Dept: CT IMAGING | Age: 64
End: 2024-08-04
Attending: SURGERY
Payer: MEDICAID

## 2024-08-04 LAB
ALBUMIN SERPL-MCNC: 1.7 G/DL (ref 3.4–5)
ANION GAP SERPL CALCULATED.3IONS-SCNC: 10 MMOL/L (ref 3–16)
ANION GAP SERPL CALCULATED.3IONS-SCNC: 10 MMOL/L (ref 3–16)
BASOPHILS # BLD: 0 K/UL (ref 0–0.2)
BASOPHILS NFR BLD: 0.4 %
BUN SERPL-MCNC: 10 MG/DL (ref 7–20)
BUN SERPL-MCNC: 9 MG/DL (ref 7–20)
C DIFF TOX GENS STL QL NAA+PROBE: ABNORMAL
CALCIUM SERPL-MCNC: 6.5 MG/DL (ref 8.3–10.6)
CALCIUM SERPL-MCNC: 6.6 MG/DL (ref 8.3–10.6)
CHLORIDE SERPL-SCNC: 108 MMOL/L (ref 99–110)
CHLORIDE SERPL-SCNC: 109 MMOL/L (ref 99–110)
CO2 SERPL-SCNC: 20 MMOL/L (ref 21–32)
CO2 SERPL-SCNC: 21 MMOL/L (ref 21–32)
CREAT SERPL-MCNC: 1 MG/DL (ref 0.6–1.2)
CREAT SERPL-MCNC: 1.1 MG/DL (ref 0.6–1.2)
DEPRECATED RDW RBC AUTO: 14.9 % (ref 12.4–15.4)
EOSINOPHIL # BLD: 0 K/UL (ref 0–0.6)
EOSINOPHIL NFR BLD: 0.4 %
GFR SERPLBLD CREATININE-BSD FMLA CKD-EPI: 56 ML/MIN/{1.73_M2}
GFR SERPLBLD CREATININE-BSD FMLA CKD-EPI: 62 ML/MIN/{1.73_M2}
GLUCOSE BLD-MCNC: 137 MG/DL (ref 70–99)
GLUCOSE BLD-MCNC: 142 MG/DL (ref 70–99)
GLUCOSE BLD-MCNC: 156 MG/DL (ref 70–99)
GLUCOSE SERPL-MCNC: 143 MG/DL (ref 70–99)
GLUCOSE SERPL-MCNC: 146 MG/DL (ref 70–99)
HCT VFR BLD AUTO: 32.1 % (ref 36–48)
HGB BLD-MCNC: 10.2 G/DL (ref 12–16)
INR PPP: 1.09 (ref 0.85–1.15)
LYMPHOCYTES # BLD: 1.3 K/UL (ref 1–5.1)
LYMPHOCYTES NFR BLD: 14.1 %
MAGNESIUM SERPL-MCNC: 1.2 MG/DL (ref 1.8–2.4)
MCH RBC QN AUTO: 29 PG (ref 26–34)
MCHC RBC AUTO-ENTMCNC: 31.9 G/DL (ref 31–36)
MCV RBC AUTO: 91.2 FL (ref 80–100)
MONOCYTES # BLD: 0.6 K/UL (ref 0–1.3)
MONOCYTES NFR BLD: 6.1 %
NEUTROPHILS # BLD: 7.1 K/UL (ref 1.7–7.7)
NEUTROPHILS NFR BLD: 79 %
ORGANISM: ABNORMAL
PERFORMED ON: ABNORMAL
PHOSPHATE SERPL-MCNC: 2 MG/DL (ref 2.5–4.9)
PHOSPHATE SERPL-MCNC: 2 MG/DL (ref 2.5–4.9)
PLATELET # BLD AUTO: 209 K/UL (ref 135–450)
PMV BLD AUTO: 9.4 FL (ref 5–10.5)
POTASSIUM SERPL-SCNC: 2.7 MMOL/L (ref 3.5–5.1)
POTASSIUM SERPL-SCNC: 2.8 MMOL/L (ref 3.5–5.1)
PROTHROMBIN TIME: 14.3 SEC (ref 11.9–14.9)
RBC # BLD AUTO: 3.53 M/UL (ref 4–5.2)
SODIUM SERPL-SCNC: 138 MMOL/L (ref 136–145)
SODIUM SERPL-SCNC: 140 MMOL/L (ref 136–145)
TRIGL SERPL-MCNC: 70 MG/DL (ref 0–150)
WBC # BLD AUTO: 9 K/UL (ref 4–11)

## 2024-08-04 PROCEDURE — 80069 RENAL FUNCTION PANEL: CPT

## 2024-08-04 PROCEDURE — 6360000004 HC RX CONTRAST MEDICATION

## 2024-08-04 PROCEDURE — 6360000002 HC RX W HCPCS: Performed by: HOSPITALIST

## 2024-08-04 PROCEDURE — 36569 INSJ PICC 5 YR+ W/O IMAGING: CPT

## 2024-08-04 PROCEDURE — 6360000002 HC RX W HCPCS: Performed by: STUDENT IN AN ORGANIZED HEALTH CARE EDUCATION/TRAINING PROGRAM

## 2024-08-04 PROCEDURE — 2580000003 HC RX 258: Performed by: STUDENT IN AN ORGANIZED HEALTH CARE EDUCATION/TRAINING PROGRAM

## 2024-08-04 PROCEDURE — 74019 RADEX ABDOMEN 2 VIEWS: CPT

## 2024-08-04 PROCEDURE — 85610 PROTHROMBIN TIME: CPT

## 2024-08-04 PROCEDURE — 85025 COMPLETE CBC W/AUTO DIFF WBC: CPT

## 2024-08-04 PROCEDURE — 02HV33Z INSERTION OF INFUSION DEVICE INTO SUPERIOR VENA CAVA, PERCUTANEOUS APPROACH: ICD-10-PCS | Performed by: INTERNAL MEDICINE

## 2024-08-04 PROCEDURE — 36415 COLL VENOUS BLD VENIPUNCTURE: CPT

## 2024-08-04 PROCEDURE — 84100 ASSAY OF PHOSPHORUS: CPT

## 2024-08-04 PROCEDURE — 2580000003 HC RX 258

## 2024-08-04 PROCEDURE — 1200000000 HC SEMI PRIVATE

## 2024-08-04 PROCEDURE — 74178 CT ABD&PLV WO CNTR FLWD CNTR: CPT

## 2024-08-04 PROCEDURE — 6360000002 HC RX W HCPCS

## 2024-08-04 PROCEDURE — 2500000003 HC RX 250 WO HCPCS

## 2024-08-04 PROCEDURE — 2580000003 HC RX 258: Performed by: HOSPITALIST

## 2024-08-04 PROCEDURE — 83735 ASSAY OF MAGNESIUM: CPT

## 2024-08-04 PROCEDURE — 99233 SBSQ HOSP IP/OBS HIGH 50: CPT | Performed by: SURGERY

## 2024-08-04 PROCEDURE — 84478 ASSAY OF TRIGLYCERIDES: CPT

## 2024-08-04 PROCEDURE — C1751 CATH, INF, PER/CENT/MIDLINE: HCPCS

## 2024-08-04 RX ORDER — SODIUM CHLORIDE, SODIUM LACTATE, POTASSIUM CHLORIDE, AND CALCIUM CHLORIDE .6; .31; .03; .02 G/100ML; G/100ML; G/100ML; G/100ML
500 INJECTION, SOLUTION INTRAVENOUS ONCE
Status: COMPLETED | OUTPATIENT
Start: 2024-08-04 | End: 2024-08-04

## 2024-08-04 RX ORDER — SODIUM CHLORIDE 0.9 % (FLUSH) 0.9 %
5-40 SYRINGE (ML) INJECTION PRN
Status: DISCONTINUED | OUTPATIENT
Start: 2024-08-04 | End: 2024-08-22 | Stop reason: HOSPADM

## 2024-08-04 RX ORDER — LIDOCAINE HYDROCHLORIDE 10 MG/ML
5 INJECTION, SOLUTION EPIDURAL; INFILTRATION; INTRACAUDAL; PERINEURAL ONCE
Status: COMPLETED | OUTPATIENT
Start: 2024-08-04 | End: 2024-08-04

## 2024-08-04 RX ORDER — DEXTROSE MONOHYDRATE, SODIUM CHLORIDE, AND POTASSIUM CHLORIDE 50; 1.49; 4.5 G/1000ML; G/1000ML; G/1000ML
INJECTION, SOLUTION INTRAVENOUS CONTINUOUS
Status: DISCONTINUED | OUTPATIENT
Start: 2024-08-04 | End: 2024-08-04

## 2024-08-04 RX ORDER — POTASSIUM CHLORIDE 7.45 MG/ML
10 INJECTION INTRAVENOUS
Status: COMPLETED | OUTPATIENT
Start: 2024-08-04 | End: 2024-08-05

## 2024-08-04 RX ORDER — IOPAMIDOL 755 MG/ML
75 INJECTION, SOLUTION INTRAVASCULAR
Status: COMPLETED | OUTPATIENT
Start: 2024-08-04 | End: 2024-08-04

## 2024-08-04 RX ORDER — THIAMINE HYDROCHLORIDE 100 MG/ML
100 INJECTION, SOLUTION INTRAMUSCULAR; INTRAVENOUS DAILY
Status: DISCONTINUED | OUTPATIENT
Start: 2024-08-04 | End: 2024-08-22 | Stop reason: HOSPADM

## 2024-08-04 RX ORDER — SODIUM CHLORIDE AND POTASSIUM CHLORIDE 150; 900 MG/100ML; MG/100ML
INJECTION, SOLUTION INTRAVENOUS CONTINUOUS
Status: DISCONTINUED | OUTPATIENT
Start: 2024-08-04 | End: 2024-08-07

## 2024-08-04 RX ORDER — SODIUM CHLORIDE 9 MG/ML
INJECTION, SOLUTION INTRAVENOUS PRN
Status: DISCONTINUED | OUTPATIENT
Start: 2024-08-04 | End: 2024-08-22 | Stop reason: HOSPADM

## 2024-08-04 RX ORDER — SODIUM CHLORIDE 0.9 % (FLUSH) 0.9 %
5-40 SYRINGE (ML) INJECTION EVERY 12 HOURS SCHEDULED
Status: DISCONTINUED | OUTPATIENT
Start: 2024-08-04 | End: 2024-08-22 | Stop reason: HOSPADM

## 2024-08-04 RX ORDER — ENOXAPARIN SODIUM 100 MG/ML
40 INJECTION SUBCUTANEOUS DAILY
Status: DISCONTINUED | OUTPATIENT
Start: 2024-08-05 | End: 2024-08-22 | Stop reason: HOSPADM

## 2024-08-04 RX ADMIN — POTASSIUM CHLORIDE 10 MEQ: 10 INJECTION, SOLUTION INTRAVENOUS at 20:21

## 2024-08-04 RX ADMIN — MEROPENEM 1000 MG: 1 INJECTION INTRAVENOUS at 04:30

## 2024-08-04 RX ADMIN — POTASSIUM CHLORIDE 10 MEQ: 10 INJECTION, SOLUTION INTRAVENOUS at 23:06

## 2024-08-04 RX ADMIN — DIATRIZOATE MEGLUMINE AND DIATRIZOATE SODIUM 30 ML: 660; 100 LIQUID ORAL; RECTAL at 16:08

## 2024-08-04 RX ADMIN — SODIUM CHLORIDE, PRESERVATIVE FREE 10 ML: 5 INJECTION INTRAVENOUS at 20:23

## 2024-08-04 RX ADMIN — VANCOMYCIN HYDROCHLORIDE 500 MG: 1 INJECTION, POWDER, LYOPHILIZED, FOR SOLUTION INTRAVENOUS at 23:23

## 2024-08-04 RX ADMIN — ASCORBIC ACID, VITAMIN A PALMITATE, CHOLECALCIFEROL, THIAMINE HYDROCHLORIDE, RIBOFLAVIN-5 PHOSPHATE SODIUM, PYRIDOXINE HYDROCHLORIDE, NIACINAMIDE, DEXPANTHENOL, ALPHA-TOCOPHEROL ACETATE, VITAMIN K1, FOLIC ACID, BIOTIN, CYANOCOBALAMIN: 200; 3300; 200; 6; 3.6; 6; 40; 15; 10; 150; 600; 60; 5 INJECTION, SOLUTION INTRAVENOUS at 17:54

## 2024-08-04 RX ADMIN — SODIUM CHLORIDE, POTASSIUM CHLORIDE, SODIUM LACTATE AND CALCIUM CHLORIDE 500 ML: 600; 310; 30; 20 INJECTION, SOLUTION INTRAVENOUS at 11:36

## 2024-08-04 RX ADMIN — HYDROMORPHONE HYDROCHLORIDE 0.5 MG: 1 INJECTION, SOLUTION INTRAMUSCULAR; INTRAVENOUS; SUBCUTANEOUS at 18:36

## 2024-08-04 RX ADMIN — SODIUM CHLORIDE, PRESERVATIVE FREE 40 MG: 5 INJECTION INTRAVENOUS at 12:38

## 2024-08-04 RX ADMIN — SODIUM CHLORIDE: 9 INJECTION, SOLUTION INTRAVENOUS at 02:18

## 2024-08-04 RX ADMIN — MEROPENEM 1000 MG: 1 INJECTION INTRAVENOUS at 18:52

## 2024-08-04 RX ADMIN — POTASSIUM CHLORIDE 10 MEQ: 10 INJECTION, SOLUTION INTRAVENOUS at 20:18

## 2024-08-04 RX ADMIN — LIDOCAINE HYDROCHLORIDE ANHYDROUS 5 ML: 10 INJECTION, SOLUTION INFILTRATION at 14:11

## 2024-08-04 RX ADMIN — IOPAMIDOL 75 ML: 755 INJECTION, SOLUTION INTRAVENOUS at 16:08

## 2024-08-04 RX ADMIN — THIAMINE HYDROCHLORIDE 100 MG: 100 INJECTION, SOLUTION INTRAMUSCULAR; INTRAVENOUS at 16:32

## 2024-08-04 RX ADMIN — POTASSIUM CHLORIDE AND SODIUM CHLORIDE: 900; 150 INJECTION, SOLUTION INTRAVENOUS at 23:19

## 2024-08-04 RX ADMIN — POTASSIUM CHLORIDE AND SODIUM CHLORIDE: 900; 150 INJECTION, SOLUTION INTRAVENOUS at 12:30

## 2024-08-04 RX ADMIN — POTASSIUM CHLORIDE 10 MEQ: 10 INJECTION, SOLUTION INTRAVENOUS at 18:50

## 2024-08-04 RX ADMIN — MAGNESIUM SULFATE HEPTAHYDRATE 6000 MG: 500 INJECTION, SOLUTION INTRAMUSCULAR; INTRAVENOUS at 12:29

## 2024-08-04 RX ADMIN — POTASSIUM PHOSPHATE, MONOBASIC AND POTASSIUM PHOSPHATE, DIBASIC 30 MMOL: 224; 236 INJECTION, SOLUTION, CONCENTRATE INTRAVENOUS at 12:32

## 2024-08-04 RX ADMIN — SODIUM CHLORIDE: 9 INJECTION, SOLUTION INTRAVENOUS at 18:51

## 2024-08-04 RX ADMIN — ENOXAPARIN SODIUM 30 MG: 100 INJECTION SUBCUTANEOUS at 09:58

## 2024-08-04 RX ADMIN — SODIUM CHLORIDE, PRESERVATIVE FREE 10 ML: 5 INJECTION INTRAVENOUS at 12:33

## 2024-08-04 ASSESSMENT — PAIN SCALES - WONG BAKER: WONGBAKER_NUMERICALRESPONSE: NO HURT

## 2024-08-04 ASSESSMENT — PAIN DESCRIPTION - PAIN TYPE: TYPE: CHRONIC PAIN

## 2024-08-04 ASSESSMENT — PAIN SCALES - GENERAL
PAINLEVEL_OUTOF10: 0
PAINLEVEL_OUTOF10: 4
PAINLEVEL_OUTOF10: 9
PAINLEVEL_OUTOF10: 0

## 2024-08-04 ASSESSMENT — PAIN DESCRIPTION - FREQUENCY: FREQUENCY: CONTINUOUS

## 2024-08-04 ASSESSMENT — PAIN DESCRIPTION - LOCATION: LOCATION: BACK

## 2024-08-04 ASSESSMENT — PAIN DESCRIPTION - ONSET: ONSET: ON-GOING

## 2024-08-04 ASSESSMENT — PAIN DESCRIPTION - ORIENTATION: ORIENTATION: MID

## 2024-08-04 ASSESSMENT — PAIN - FUNCTIONAL ASSESSMENT: PAIN_FUNCTIONAL_ASSESSMENT: PREVENTS OR INTERFERES SOME ACTIVE ACTIVITIES AND ADLS

## 2024-08-04 ASSESSMENT — PAIN DESCRIPTION - DESCRIPTORS: DESCRIPTORS: ACHING

## 2024-08-04 NOTE — CONSULTS
[facility] -  Clinical Pharmacy Note    TPN - Management by Pharmacy    Consult Date(s): 8/4  Consulting Provider(s): Dr Butler    Assessment / Plan  Gastric outlet obstruction 2/2 ulcerated mass, ileus - Parenteral Nutrition  Access: PICC team coming in to place access *confirmed with Charge RN  Day of Therapy: 1  Formulation:  Clinimix E 5/15  Clinimix Rate:  41.7 mL/hr (Total volume = 1000 mL/day)  Lipids:  Start on Mon 8/5 per RD -- Lipids 20% 250mL over 12 hours on Mon & Thurs only (due to national shortage)  Appreciate Clinical Dietitian's recommendations for formulation and goal rate.  Will advance stepwise per clinical RD note from 8/3:   Day 1: start with Clinimix 5/15 at 41.7 mL/hr  Day 2: advance to Clinimix 5/20 at 55 mL/hr  Day 3: advance to Clinimix 5/20 to goal 70 mL/hr  Electrolytes:  Patient with large drops in Phos, K, and Mag.  Will replete today with Mag sulfate 6g IVPB, KPhos 30mmol IVPB, and KCL 50 mEq IVPB total.    Concern for refeeding - PS sent to Dr Almaguer to recommend mor frequent RFP checks out of concern.  Will proceed with E bag today as patient able to receive repletion outside of TPN.   Clinimix-E includes standard electrolyte formulation.  Recommend further repletion with supplemental IVPBs as needed.  Maintenance IVF:  NS with 20 mEq KCl / L @ 100 mL/hr  Glucose control:  BG ranging 143-180 in the past 24h. Medium SS lispro ordered.  Will monitor as TPN starts and advances.     Add MVI 10 mL/day in PN on Mon-Wed-Fri only (due to national shortage) & Trace Elements 1 mL/day in PN daily.  Daily renal panel, daily magnesium, and weekly triglycerides ordered per protocol.  PN will be re-ordered daily.    Thank you for consulting pharmacy,    Daylin Rojas PharmD., BCPS   8/4/2024 12:21 PM  Wireless: [phone]        Interval update:  therapy initiation     Subjective/Objective:   Agustina Fried is a 64 y.o. female with a PMHx significant for CAD, HTN, HLP, HFpEF, T2D, TIA, CKD

## 2024-08-04 NOTE — PROGRESS NOTES
Progress Note    Patient Agustina Fried  MRN: [id number]  YOB: 1960 Age: 64 y.o. Sex: female  Room: [address] [place] [address]       Admitting Physician: Victor M Almaguer MD   Date of Admission: 8/2/2024 10:45 PM   Primary Care Physician: Viridiana Blanco, APRN - NP     Subjective:  Agustina Fried was seen and examined. We are following for small bowel obstruction due to obstructive, malignant mass in the duodenum.  Final biopsy results are still pending..  --   Patient has a large amount of debris in the stomach noted on prior endoscopy before transferred to [facility].  Surgery requested gastric debris removal with endoscopy and further assessment for possible duodenal stent placement.  Currently patient getting ready for CT abdomen and pelvis.  Denies any complaints.  Still has NG tube in place        Objective:  Vital Signs:   Vitals:    08/04/24 1125   BP: (!) 149/83   Pulse: 79   Resp: 18   Temp: 98.9 °F (37.2 °C)   SpO2: 98%         Physical Exam:  General appearance: Alert, no acute distress, grooming appropriate  Eyes: No scleral icterus, EOM grossly intact  Neck: Trachea midline, no JVD  Chest/Lungs: Normal effort with no accessory muscle use on RA  Cardiovascular: RRR, well perfused  Abdomen: Soft, mildly-distended, non-tender, no rebound, guarding, or rigidity. Well-healed midline ex lap scar; NGT gastric output with food  Skin: Warm and dry, no rashes  Extremities: No edema, no cyanosis  Neuro: A&Ox3, no focal deficits, sensation intact    Intake/Output:    Intake/Output Summary (Last 24 hours) at 8/4/2024 1154  Last data filed at 8/4/2024 0950  Gross per 24 hour   Intake 2310.68 ml   Output 2100 ml   Net 210.68 ml        Current Medications:  Current Facility-Administered Medications   Medication Dose Route Frequency Provider Last Rate Last Admin    sodium chloride flush 0.9 % injection 5-40 mL  5-40 mL IntraVENous 2 times per day Marlo Butler,         sodium chloride flush 0.9 % injection 5-40  post stenting, CVA, TIA, history of Wilms tumor as a child, history of breast cancer status postradiation and lumpectomy in 2014, history of right colon cancer status post right hemicolectomy in March 2023, does not appear to have had a follow-up colonoscopy since that time.  Chronic anemia.  EGD in April 2024 showing a duodenal ulcer with intramucosal adenocarcinoma on biopsies, patient appeared to have not followed up after the EGD.  MRCP December 2023 showed mild biliary ductal dilation and persistent pancreatic ductal dilation, patient was recommended to have a EUS and apparently did not follow-up.       Plan:  Surgery requesting upper endoscopy to help with removal of large amount of debris retained in her stomach.  Also to assess for duodenal stent placement.  Patient likely will get gastrojejunostomy based on further evaluation.  Surgery also requesting a colonoscopy given history of rectal bleeding, anemia and prior history of right colon cancer status post right hemicolectomy in March 2023.  Unable to prep from above, will give her multiple enemas, surgery only wants to rule out any large lesions.      Belkis Lamar MD    [facility]    [phone]. Also available via [employer]    Please note that some or all of this record was generated using voice recognition software. If there are any questions about the content of this document, please contact the author as some errors in translation may have occurred.

## 2024-08-04 NOTE — PROGRESS NOTES
V2.0    [facility] Progress Note      Name:  Agustina Fried /Age/Sex: 1960  (64 y.o. female)   MRN & CSN:  [id number] & [id number] Encounter Date/Time: 2024 11:32 AM EDT   Location:  5319/5319-01 PCP: Viridiana Blanco APRN - NP     Attending:Victor M Almaguer MD       Hospital Day: 3    Assessment and Recommendations   Agustina Fried is a 64 y.o. 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      It does not look like she followed after her EGD results and she did not follow with oncology since . She was also recommended to have an EUS for evaluation of elevated alk phos and double ductal sign on imaging, but she has failed to follow-up numerous times      Pt was admitted - with acute CHF exacerbation and ESBL Ecoli UTI treated with meropenem. She presented to the ED  with  nausea, vomiting, diarrhea , abd pain and rectal bleeding. CT showed ileus with severe gastric distension. She was seen by general surgery and had NG. She was admitted to [facility]. Hb dropped from 8.1 to 6.9. She was seen by GI who did an EGD that showed duodenal malignancy resulting in obstruction . CT chest showed Patchy ground-glass opacity within the left lower lobe with superimposed  micronodularity; correlate for pneumonitis.  Metastatic disease cannot be entirely excluded. Chronic heterogeneous attenuation of thoracic and cervical vertebral bodies, as well as sternum, which could reflect metabolic bone disease or chronic metastatic disease.     Pt and family decided on  PRN  glucagon (rDNA), 1 mg, PRN  dextrose, , Continuous PRN        Labs and Imaging   XR ABDOMEN (KUB) (SINGLE AP VIEW)    Result Date: 8/3/2024  XR ABDOMEN (KUB) (SINGLE AP VIEW) Indication: NG tube placement Findings: Comparison with abdominal radiograph from yesterday. Nasogastric tube overlies the epigastrium with side port beyond the gastroesophageal junction. Nonobstructive bowel gas pattern.     Impression: Satisfactory positioning of nasogastric tube. Electronically signed by Carlos Waterman MD    XR ABDOMEN (KUB) (SINGLE AP VIEW)    Result Date: 8/2/2024  EXAMINATION: ONE SUPINE XRAY VIEW(S) OF THE ABDOMEN 8/2/2024 10:36 am COMPARISON: 07/30/2024 radiograph HISTORY: ORDERING SYSTEM PROVIDED HISTORY: confirm NG placement TECHNOLOGIST PROVIDED HISTORY: Reason for exam:->confirm NG placement Reason for Exam: NGT placement FINDINGS: Appropriate positioning of enteric tube with tip and side holes confirmed in the lumen of the stomach.  Tube appears to have been slightly withdrawn in comparison to the prior radiograph.  Bowel gas pattern unremarkable in the upper abdomen.  There is no pneumoperitoneum.  Lung bases are clear.  No significant skeletal finding.     Appropriate positioning of enteric tube.     CT CHEST WO CONTRAST    Result Date: 7/31/2024  EXAMINATION: CT OF THE CHEST WITHOUT CONTRAST 7/31/2024 7:11 pm TECHNIQUE: CT of the chest was performed without the administration of intravenous contrast. Multiplanar reformatted images are provided for review. Automated exposure control, iterative reconstruction, and/or weight based adjustment of the mA/kV was utilized to reduce the radiation dose to as low as reasonably achievable. COMPARISON: 02/01/2021 HISTORY: ORDERING SYSTEM PROVIDED HISTORY: duodenal mass, evalaute for evidence mets TECHNOLOGIST PROVIDED HISTORY: Reason for exam:->duodenal mass, evalaute for evidence mets Reason for Exam: evaluate for mets FINDINGS: Mediastinum: Nasogastric tube extends to  hours.    Invalid input(s): \"ALB\"  Lipids:   Lab Results   Component Value Date/Time    CHOL 154 11/27/2023 07:00 PM    HDL 75 11/27/2023 07:00 PM    TRIG 89 11/27/2023 07:00 PM     Hemoglobin A1C:   Lab Results   Component Value Date/Time    LABA1C 5.4 07/30/2024 10:03 AM     TSH:   Lab Results   Component Value Date/Time    TSH 0.86 12/11/2022 04:45 AM     Troponin: No results found for: \"TROPONINT\"  Lactic Acid: No results for input(s): \"LACTA\" in the last 72 hours.  BNP: No results for input(s): \"PROBNP\" in the last 72 hours.  UA:  Lab Results   Component Value Date/Time    NITRU Negative 07/31/2024 04:34 AM    COLORU Yellow 07/31/2024 04:34 AM    PHUR 6.0 07/31/2024 04:34 AM    PHUR 5.5 04/18/2024 04:40 PM    WBCUA 21-50 07/31/2024 04:34 AM    RBCUA 5-10 07/31/2024 04:34 AM    YEAST Present 12/10/2023 02:53 PM    BACTERIA 4+ 07/31/2024 04:34 AM    CLARITYU SL CLOUDY 07/31/2024 04:34 AM    SPECGRAV 1.010 01/06/2013 12:11 AM    LEUKOCYTESUR TRACE 07/31/2024 04:34 AM    UROBILINOGEN 0.2 07/31/2024 04:34 AM    BILIRUBINUR Negative 07/31/2024 04:34 AM    BLOODU MODERATE 07/31/2024 04:34 AM    GLUCOSEU Negative 07/31/2024 04:34 AM    GLUCOSEU >=1000 mg/dL 06/07/2010 03:38 PM    KETUA Negative 07/31/2024 04:34 AM    AMORPHOUS 3+ 04/18/2024 04:40 PM     Urine Cultures:   Lab Results   Component Value Date/Time    LABURIN  07/31/2024 04:34 AM     >100,000 CFU/ml  CONTACT PRECAUTIONS INDICATED  Carbapenem antibiotics are the best option for infections caused  by ESBL producing organisms.  Other antibiotic classes are  likely to result in treatment failure, even for organisms  demonstrating in vitro susceptibility.       Blood Cultures:   Lab Results   Component Value Date/Time    BC No Growth after 4 days of incubation. 04/17/2024 06:19 AM     Lab Results   Component Value Date/Time    BLOODCULT2 No Growth after 4 days of incubation. 04/17/2024 06:19 AM     Organism:   Lab Results   Component Value Date/Time    ALLEN JOEL  difficile toxin B gene detected 08/01/2024 12:00 PM         Electronically signed by Victor M Almaguer MD on 8/4/2024 at 11:39 AM

## 2024-08-04 NOTE — PROGRESS NOTES
Patient alert and oriented x3. Patient denies any pain or nausea. Patient had multiple BM. NG in place and flushed with diet Pepsi. Assessment done.see flowsheet. Patient in bed lowest position call light and bedside table within reach. All needs are met at this time. Patient aware to call if any help needed.Plan of care ongoing  /76   Pulse 78   Temp 99.2 °F (37.3 °C) (Oral)   Resp 18   Ht 1.6 m (5' 2.99\")   Wt 58 kg (127 lb 13.9 oz)   SpO2 99%   BMI 22.66 kg/m²

## 2024-08-04 NOTE — PLAN OF CARE
Problem: Safety - Adult  Goal: Free from fall injury  Outcome: Progressing     Problem: Chronic Conditions and Co-morbidities  Goal: Patient's chronic conditions and co-morbidity symptoms are monitored and maintained or improved  Outcome: Progressing     Problem: Discharge Planning  Goal: Discharge to home or other facility with appropriate resources  Outcome: Progressing     Problem: ABCDS Injury Assessment  Goal: Absence of physical injury  Outcome: Progressing     Problem: Nutrition Deficit:  Goal: Optimize nutritional status  Outcome: Progressing

## 2024-08-04 NOTE — PROGRESS NOTES
General Surgery   Daily Progress Note  Patient: Agustina Fried      CC: Duodenal mass    SUBJECTIVE:   NAEON. Patient resting comfortably in bed. Pain controlled. Tolerating intermittent NGT clamping. Denies of nausea or vomiting. No other complaints.     ROS:   A 14 point review of systems was conducted, significant findings as noted above. All other systems negative.    OBJECTIVE:    PHYSICAL EXAM:    Vitals:    08/03/24 1455 08/03/24 1915 08/03/24 2349 08/04/24 0353   BP: (!) 151/73 (!) 147/71 (!) 143/74 138/76   Pulse: 60 73 82 78   Resp: 18 18 18 18   Temp: 98.6 °F (37 °C) 99 °F (37.2 °C) 98.8 °F (37.1 °C) 99.2 °F (37.3 °C)   TempSrc: Oral Oral Oral Oral   SpO2: 98% 99% 100% 99%   Weight:       Height:           General appearance: Alert, no acute distress, grooming appropriate  Eyes: No scleral icterus, EOM grossly intact  Neck: Trachea midline, no JVD  Chest/Lungs: Normal effort with no accessory muscle use on RA  Cardiovascular: RRR, well perfused  Abdomen: Soft, mildly-distended, non-tender, no rebound, guarding, or rigidity. Well-healed midline ex lap scar; NGT gastric output with food  Skin: Warm and dry, no rashes  Extremities: No edema, no cyanosis  Neuro: A&Ox3, no focal deficits, sensation intact      ASSESSMENT & PLAN:   This is a 64 y.o. female with Hx of HTN, DM, GERD, hyperlipidemia, CKD stage IIIb and surgical history significant for Wilms tumor s/p right nephrectomy (age 8), breast cancer s/p radiation and lumpectomy (09/2014), invasive adenocarcinoma s/p R colectomy (03/2023) who presented with gastric outlet obstruction symptoms due to large ulcerated mass in the second portion of duodenum.     - Continue to monitor NGT. Will continue to trial coke instillation for evacuation of retained food. OK for intermittent NGT tube clamping.  - PICC and TPN today.   - Will follow-up recommendations from GI  - Consult Nephrology on recommendations for PICC line placement and need for triple contrast CT  today for further characterization of duodenal mass  - Further care per primary.     Brigitte Nichols DO  PGY1, General Surgery  08/04/24  7:41 AM  [phone]

## 2024-08-04 NOTE — PROGRESS NOTES
Pt's CHAN Fried is listed as primary contact and was called by this RN, updated, and PICC consent was obtained. Consent is in chart.     Electronically signed by Solo Lutz RN on 8/4/2024 at 1:17 PM

## 2024-08-04 NOTE — CONSULTS
Ph: [phone], Fax: [phone]           [facility]               Reason for admission:                  pain abdomen nausea and vomiting    Brief Summary :     Agustina Fried is being seen by nephrology for  pain abdomen nausea and vomiting.      Interval History and plan:       she presented with abdominal distention  Has NG tube with suction  On IV fluids now  Last creatinine is-1.1  Potassium is very low and getting fluids      Plan:  Ok for picc line given cr < 1.,5  Continue iv fluids  Requested RN to bleed potassium phosphate for giving normal saline due to risks of further hypokalemia                       Assessment :       CKD Stage IIIa   creatinine has been variable in the past she has a history of recurrent SHARRON  Last creatinine was 2 upon discharge from 7/2 hospitalization   she has history of diabetes mellitus hypertension      Generalized Edema  She was discharged on 10 mg of torsemide last hospital stay   has diastolic dysfunction  Hypertension   BP: (138-143)/(74-76)  Pulse:  [78-82]   BP goal inpatient 130-140 systolic inpatient               [facility] would like to thank Victor M Almaguer MD   for opportunity to serve this patient      Please call with questions at-   24 Hrs Answering service [phone] or  7 am- 5 pm via Perfect serve or cell phone  Dr.Sudhir Temo MD       HPI :     Agustina Fried is a 64 y.o. female presented to   the hospital on 8/2/2024 with  pain abdomen nausea and vomiting.  She is known to have nausea vomiting diarrhea for several days because of which she came to the hospital.  She needed NG tube placement in the emergency room.  Found to have severe gastric distention.  She is known to have CKD and has had multiple hospitalization in the past        PMH/PSH/SH/Family History:     Past Medical History:   Diagnosis Date    Abnormal brain MRI 7/20/2017    Partially empty sella and minimal chronic small vessel ischemic disease    Acute  bilateral low back pain without sciatica 11/2/2016    SHARRON (acute kidney injury) ([facility]) 7/5/2017    Arthritis     back    Bipolar disorder ([facility]) 10/18/2008    CAD (coronary artery disease)     stent placed 6/8/20    Cancer ([facility]) 2015    bilateral breast:s/p lumpectomy/radiation:under care care of breast specialist:Dr. Boone     Carotid stenosis, bilateral:<50%:per US 7/2016 7/15/2016    Carpal tunnel syndrome 10/18/2008    Cervical cancer screening 2014    Nml per pt'.    Coronary artery disease of native artery of native heart with stable angina pectoris ([facility]) 6/9/2020    DDD (degenerative disc disease), lumbar 7/18/2018    Depression     under care of pschiatrist:Dr. Rojas    Depression/anxiety 7/5/2017    Depression/anxiety     Diabetes mellitus ([facility])     Gout     History of mammogram 10/28/2016;8/14/17    Negative    History of therapeutic radiation     Hyperlipidemia     Hypertension     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 stage 3 chronic kidney disease ([facility]) 9/17/2017    Microalbuminuria 7/1/2016    Neuropathy in diabetes ([facility])     Non morbid obesity 7/1/2016    Pancreatitis 5/12/16    [facility] hospitalization 5/12/16-5/16/16:under care of GI:chronic pancreatitis    S/P endoscopy 6/14/2016    [facility]:per pt' & her family member was nml.    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 myelopathy or radiculopathy 3/10/2017    Transient cerebral ischemia 07/15/2016    TIA:7/10/16    Unspecified cerebral artery occlusion with cerebral infarction     TIA          Medication:     Current Facility-Administered Medications: pantoprazole (PROTONIX) tablet 40 mg, 40 mg, Oral, QAM AC  sodium chloride flush 0.9 % injection 5-40 mL, 5-40 mL, IntraVENous, 2 times per day  sodium chloride flush 0.9 % injection 5-40 mL, 5-40 mL, IntraVENous, PRN  0.9 % sodium chloride infusion, , IntraVENous, PRN  cloNIDine (CATAPRES) 0.1 MG/24HR 1 patch, 1 patch, TransDERmal, Weekly  0.9 %

## 2024-08-04 NOTE — PROCEDURES
PROCEDURE NOTE  Date: 2024   Name: Agustina Fried  YOB: 1960    Procedures                                                                         DOUBLE PICC PROCEDURE NOTE  Chart reviewed for allergies, diagnosis, labs, known contraindications, reason for line placement and planned length of treatment.  Informed consent noted to be signed and on chart.  Insertion procedure discussed with patient/family member.  Three patient identifiers - Patient name,   and MRN -  completed &  confirmed verbally.         Time out performed.  Hat, mask and eye shield donned.  PICC site cleaned with chlorhexidine wipes then scrubbed with Chloraprep one-step applicator for 30 seconds x 1.   Hand Hygiene  performed with 3% Chlorhexidine surgical scrub x1 min prior to  sterile gloves, sterile gown being donned.  Patient draped using maximal sterile barrier technique ( head to toe ).  PICC site scrubbed a 2nd time with Chloraprep One-Step applicator x 30 sec. Modified Seldinger technique/ultrasound assisted and tip locating system utilized for insertion and 1% Lidocaine 5 ml injected intradermal pre-insertion.  PICC tip location in the SVC confirmed by ECG technology.   Positive brisk blood return obtained from all lumens.  Valves placed to all lumens and  flushed with 10 mls 0.9% Sterile Sodium Chloride.  All lumens flush easily with no resistance.  Skin prep applied to site.   Catheter secured with non-sutured locking device per hospital protocol. Bio-patch/CHG impregnated sterile tegaderm dressing applied.  Alcohol Swab Caps applied to each valve.  Sterile field maintained during procedure.  PICC insertion, rhythm and positioning wire (utilized prn) accounted  for post procedure and disposed of in sharps.  Appearance of site is Clean dry and intact without bleeding or edema. All edges of Tegaderm occlusive.   Site marked with date and initials of RN placing line. Teaching performed to pt/family and noted in  education section.   Bed placed in low position post-procedure. Top 2 side rails in up position call button in reach.Pt. Response to procedure tolerated well.  RN notified of all of the above.  Pt with limited vessels.  A Double Lumen Power Injectable PICC was trimmed at 40 CM and placed per KEMAL brachial vein, 0 cm showing from insertion site.

## 2024-08-04 NOTE — PROGRESS NOTES
Pharmacist Review and Automatic Dose Adjustment of Prophylactic Enoxaparin    Reviewed reason(s) for admission/hospital problem list    The reviewing pharmacist has made an adjustment to the ordered enoxaparin dose or converted to UFH per the approved [facility] protocol and table as identified below.        Agustina Fried is a 64 y.o. female.     Recent Labs     08/02/24  0518 08/03/24  0736 08/04/24  1051   CREATININE 1.8* 1.3* 1.1  1.0       Estimated Creatinine Clearance: 42 mL/min (based on SCr of 1.1 mg/dL).    Recent Labs     08/03/24  0736 08/04/24  1051   HGB 9.6* 10.2*   HCT 30.0* 32.1*    209     Recent Labs     08/04/24  1051   INR 1.09       Height:   Ht Readings from Last 1 Encounters:   08/02/24 1.6 m (5' 2.99\")     Weight:  Wt Readings from Last 1 Encounters:   08/04/24 59.4 kg (130 lb 15.3 oz)               Plan: Based upon the patient's weight and renal function    Ordered: Enoxaparin 30mg SUBQ Daily    Changed/converted to    New Order: Enoxaparin 40mg SUBQ Daily      Thank you,  Ying Qureshi [facility]  8/4/2024, 11:46 AM

## 2024-08-05 ENCOUNTER — ANESTHESIA (OUTPATIENT)
Dept: ENDOSCOPY | Age: 64
End: 2024-08-05
Payer: MEDICAID

## 2024-08-05 ENCOUNTER — ANESTHESIA EVENT (OUTPATIENT)
Dept: ENDOSCOPY | Age: 64
End: 2024-08-05
Payer: MEDICAID

## 2024-08-05 LAB
ALBUMIN SERPL-MCNC: 2.2 G/DL (ref 3.4–5)
ALP SERPL-CCNC: 164 U/L (ref 40–129)
ALT SERPL-CCNC: 8 U/L (ref 10–40)
ANION GAP SERPL CALCULATED.3IONS-SCNC: 11 MMOL/L (ref 3–16)
AST SERPL-CCNC: 14 U/L (ref 15–37)
BILIRUB DIRECT SERPL-MCNC: <0.2 MG/DL (ref 0–0.3)
BILIRUB INDIRECT SERPL-MCNC: ABNORMAL MG/DL (ref 0–1)
BILIRUB SERPL-MCNC: <0.2 MG/DL (ref 0–1)
BUN SERPL-MCNC: 11 MG/DL (ref 7–20)
CALCIUM SERPL-MCNC: 7.4 MG/DL (ref 8.3–10.6)
CHLORIDE SERPL-SCNC: 106 MMOL/L (ref 99–110)
CO2 SERPL-SCNC: 21 MMOL/L (ref 21–32)
CREAT SERPL-MCNC: 1.1 MG/DL (ref 0.6–1.2)
DEPRECATED RDW RBC AUTO: 14.5 % (ref 12.4–15.4)
GFR SERPLBLD CREATININE-BSD FMLA CKD-EPI: 56 ML/MIN/{1.73_M2}
GLUCOSE BLD-MCNC: 146 MG/DL (ref 70–99)
GLUCOSE BLD-MCNC: 188 MG/DL (ref 70–99)
GLUCOSE BLD-MCNC: 220 MG/DL (ref 70–99)
GLUCOSE BLD-MCNC: 251 MG/DL (ref 70–99)
GLUCOSE BLD-MCNC: 341 MG/DL (ref 70–99)
GLUCOSE SERPL-MCNC: 254 MG/DL (ref 70–99)
HCT VFR BLD AUTO: 32.2 % (ref 36–48)
HGB BLD-MCNC: 10.6 G/DL (ref 12–16)
MAGNESIUM SERPL-MCNC: 2.7 MG/DL (ref 1.8–2.4)
MCH RBC QN AUTO: 29.5 PG (ref 26–34)
MCHC RBC AUTO-ENTMCNC: 32.9 G/DL (ref 31–36)
MCV RBC AUTO: 89.5 FL (ref 80–100)
PERFORMED ON: ABNORMAL
PHOSPHATE SERPL-MCNC: 3.5 MG/DL (ref 2.5–4.9)
PLATELET # BLD AUTO: 240 K/UL (ref 135–450)
PMV BLD AUTO: 9.8 FL (ref 5–10.5)
POTASSIUM SERPL-SCNC: 3.7 MMOL/L (ref 3.5–5.1)
PROT SERPL-MCNC: 6.5 G/DL (ref 6.4–8.2)
RBC # BLD AUTO: 3.6 M/UL (ref 4–5.2)
SODIUM SERPL-SCNC: 138 MMOL/L (ref 136–145)
TRIGL SERPL-MCNC: 91 MG/DL (ref 0–150)
WBC # BLD AUTO: 9.3 K/UL (ref 4–11)

## 2024-08-05 PROCEDURE — 2709999900 HC NON-CHARGEABLE SUPPLY: Performed by: INTERNAL MEDICINE

## 2024-08-05 PROCEDURE — 2580000003 HC RX 258: Performed by: STUDENT IN AN ORGANIZED HEALTH CARE EDUCATION/TRAINING PROGRAM

## 2024-08-05 PROCEDURE — 99233 SBSQ HOSP IP/OBS HIGH 50: CPT | Performed by: SURGERY

## 2024-08-05 PROCEDURE — 84478 ASSAY OF TRIGLYCERIDES: CPT

## 2024-08-05 PROCEDURE — 2580000003 HC RX 258: Performed by: INTERNAL MEDICINE

## 2024-08-05 PROCEDURE — 6370000000 HC RX 637 (ALT 250 FOR IP)

## 2024-08-05 PROCEDURE — 3700000000 HC ANESTHESIA ATTENDED CARE: Performed by: INTERNAL MEDICINE

## 2024-08-05 PROCEDURE — 80076 HEPATIC FUNCTION PANEL: CPT

## 2024-08-05 PROCEDURE — 7100000011 HC PHASE II RECOVERY - ADDTL 15 MIN: Performed by: INTERNAL MEDICINE

## 2024-08-05 PROCEDURE — 2580000003 HC RX 258: Performed by: HOSPITALIST

## 2024-08-05 PROCEDURE — 2500000003 HC RX 250 WO HCPCS

## 2024-08-05 PROCEDURE — 6360000002 HC RX W HCPCS: Performed by: INTERNAL MEDICINE

## 2024-08-05 PROCEDURE — 6360000002 HC RX W HCPCS: Performed by: NURSE ANESTHETIST, CERTIFIED REGISTERED

## 2024-08-05 PROCEDURE — 80048 BASIC METABOLIC PNL TOTAL CA: CPT

## 2024-08-05 PROCEDURE — 1200000000 HC SEMI PRIVATE

## 2024-08-05 PROCEDURE — 88305 TISSUE EXAM BY PATHOLOGIST: CPT

## 2024-08-05 PROCEDURE — 83735 ASSAY OF MAGNESIUM: CPT

## 2024-08-05 PROCEDURE — 6360000002 HC RX W HCPCS

## 2024-08-05 PROCEDURE — 3609017100 HC EGD: Performed by: INTERNAL MEDICINE

## 2024-08-05 PROCEDURE — 0DJ08ZZ INSPECTION OF UPPER INTESTINAL TRACT, VIA NATURAL OR ARTIFICIAL OPENING ENDOSCOPIC: ICD-10-PCS | Performed by: INTERNAL MEDICINE

## 2024-08-05 PROCEDURE — 2500000003 HC RX 250 WO HCPCS: Performed by: NURSE ANESTHETIST, CERTIFIED REGISTERED

## 2024-08-05 PROCEDURE — 6370000000 HC RX 637 (ALT 250 FOR IP): Performed by: NURSE PRACTITIONER

## 2024-08-05 PROCEDURE — 2580000003 HC RX 258: Performed by: NURSE ANESTHETIST, CERTIFIED REGISTERED

## 2024-08-05 PROCEDURE — 6360000002 HC RX W HCPCS: Performed by: HOSPITALIST

## 2024-08-05 PROCEDURE — 84100 ASSAY OF PHOSPHORUS: CPT

## 2024-08-05 PROCEDURE — 3700000001 HC ADD 15 MINUTES (ANESTHESIA): Performed by: INTERNAL MEDICINE

## 2024-08-05 PROCEDURE — 6360000002 HC RX W HCPCS: Performed by: STUDENT IN AN ORGANIZED HEALTH CARE EDUCATION/TRAINING PROGRAM

## 2024-08-05 PROCEDURE — 0DBE8ZX EXCISION OF LARGE INTESTINE, VIA NATURAL OR ARTIFICIAL OPENING ENDOSCOPIC, DIAGNOSTIC: ICD-10-PCS | Performed by: INTERNAL MEDICINE

## 2024-08-05 PROCEDURE — 3609027000 HC COLONOSCOPY: Performed by: INTERNAL MEDICINE

## 2024-08-05 PROCEDURE — 6370000000 HC RX 637 (ALT 250 FOR IP): Performed by: HOSPITALIST

## 2024-08-05 PROCEDURE — 7100000010 HC PHASE II RECOVERY - FIRST 15 MIN: Performed by: INTERNAL MEDICINE

## 2024-08-05 PROCEDURE — 85027 COMPLETE CBC AUTOMATED: CPT

## 2024-08-05 RX ORDER — INSULIN GLARGINE 100 [IU]/ML
5 INJECTION, SOLUTION SUBCUTANEOUS DAILY
Status: DISCONTINUED | OUTPATIENT
Start: 2024-08-05 | End: 2024-08-06

## 2024-08-05 RX ORDER — CLONIDINE 0.2 MG/24H
1 PATCH, EXTENDED RELEASE TRANSDERMAL WEEKLY
Status: DISCONTINUED | OUTPATIENT
Start: 2024-08-05 | End: 2024-08-09

## 2024-08-05 RX ORDER — INSULIN LISPRO 100 [IU]/ML
0-16 INJECTION, SOLUTION INTRAVENOUS; SUBCUTANEOUS EVERY 6 HOURS SCHEDULED
Status: DISCONTINUED | OUTPATIENT
Start: 2024-08-05 | End: 2024-08-17

## 2024-08-05 RX ORDER — LIDOCAINE HYDROCHLORIDE 20 MG/ML
INJECTION, SOLUTION INFILTRATION; PERINEURAL PRN
Status: DISCONTINUED | OUTPATIENT
Start: 2024-08-05 | End: 2024-08-05 | Stop reason: SDUPTHER

## 2024-08-05 RX ORDER — METRONIDAZOLE 500 MG/100ML
500 INJECTION, SOLUTION INTRAVENOUS EVERY 8 HOURS
Status: DISCONTINUED | OUTPATIENT
Start: 2024-08-05 | End: 2024-08-15

## 2024-08-05 RX ORDER — PROPOFOL 10 MG/ML
INJECTION, EMULSION INTRAVENOUS PRN
Status: DISCONTINUED | OUTPATIENT
Start: 2024-08-05 | End: 2024-08-05 | Stop reason: SDUPTHER

## 2024-08-05 RX ORDER — GLYCOPYRROLATE 0.2 MG/ML
INJECTION INTRAMUSCULAR; INTRAVENOUS PRN
Status: DISCONTINUED | OUTPATIENT
Start: 2024-08-05 | End: 2024-08-05 | Stop reason: SDUPTHER

## 2024-08-05 RX ORDER — SODIUM CHLORIDE, SODIUM LACTATE, POTASSIUM CHLORIDE, CALCIUM CHLORIDE 600; 310; 30; 20 MG/100ML; MG/100ML; MG/100ML; MG/100ML
INJECTION, SOLUTION INTRAVENOUS CONTINUOUS PRN
Status: DISCONTINUED | OUTPATIENT
Start: 2024-08-05 | End: 2024-08-05 | Stop reason: SDUPTHER

## 2024-08-05 RX ADMIN — INSULIN LISPRO 4 UNITS: 100 INJECTION, SOLUTION INTRAVENOUS; SUBCUTANEOUS at 01:46

## 2024-08-05 RX ADMIN — GLYCOPYRROLATE 0.2 MG: 0.2 INJECTION INTRAMUSCULAR; INTRAVENOUS at 14:25

## 2024-08-05 RX ADMIN — INSULIN LISPRO 4 UNITS: 100 INJECTION, SOLUTION INTRAVENOUS; SUBCUTANEOUS at 05:47

## 2024-08-05 RX ADMIN — VANCOMYCIN HYDROCHLORIDE 500 MG: 1 INJECTION, POWDER, LYOPHILIZED, FOR SOLUTION INTRAVENOUS at 10:20

## 2024-08-05 RX ADMIN — SODIUM CHLORIDE, PRESERVATIVE FREE 10 ML: 5 INJECTION INTRAVENOUS at 21:40

## 2024-08-05 RX ADMIN — VANCOMYCIN HYDROCHLORIDE 500 MG: 1 INJECTION, POWDER, LYOPHILIZED, FOR SOLUTION INTRAVENOUS at 17:30

## 2024-08-05 RX ADMIN — HYDROMORPHONE HYDROCHLORIDE 0.5 MG: 1 INJECTION, SOLUTION INTRAMUSCULAR; INTRAVENOUS; SUBCUTANEOUS at 01:32

## 2024-08-05 RX ADMIN — PROPOFOL 50 MG: 10 INJECTION, EMULSION INTRAVENOUS at 14:29

## 2024-08-05 RX ADMIN — INSULIN GLARGINE 5 UNITS: 100 INJECTION, SOLUTION SUBCUTANEOUS at 18:27

## 2024-08-05 RX ADMIN — HYDROMORPHONE HYDROCHLORIDE 0.5 MG: 1 INJECTION, SOLUTION INTRAMUSCULAR; INTRAVENOUS; SUBCUTANEOUS at 06:46

## 2024-08-05 RX ADMIN — I.V. FAT EMULSION 250 ML: 20 EMULSION INTRAVENOUS at 17:54

## 2024-08-05 RX ADMIN — SODIUM CHLORIDE, PRESERVATIVE FREE 40 MG: 5 INJECTION INTRAVENOUS at 22:56

## 2024-08-05 RX ADMIN — MEROPENEM 1000 MG: 1 INJECTION INTRAVENOUS at 05:55

## 2024-08-05 RX ADMIN — VANCOMYCIN HYDROCHLORIDE 500 MG: 1 INJECTION, POWDER, LYOPHILIZED, FOR SOLUTION INTRAVENOUS at 22:02

## 2024-08-05 RX ADMIN — PROPOFOL 40 MG: 10 INJECTION, EMULSION INTRAVENOUS at 14:33

## 2024-08-05 RX ADMIN — POTASSIUM CHLORIDE 10 MEQ: 10 INJECTION, SOLUTION INTRAVENOUS at 01:39

## 2024-08-05 RX ADMIN — PHENOL 1 SPRAY: 1.5 LIQUID ORAL at 05:47

## 2024-08-05 RX ADMIN — THIAMINE HYDROCHLORIDE 100 MG: 100 INJECTION, SOLUTION INTRAMUSCULAR; INTRAVENOUS at 09:52

## 2024-08-05 RX ADMIN — SODIUM CHLORIDE, SODIUM LACTATE, POTASSIUM CHLORIDE, AND CALCIUM CHLORIDE: .6; .31; .03; .02 INJECTION, SOLUTION INTRAVENOUS at 14:21

## 2024-08-05 RX ADMIN — MEROPENEM 1000 MG: 1 INJECTION INTRAVENOUS at 17:18

## 2024-08-05 RX ADMIN — PHENOL 1 SPRAY: 1.5 LIQUID ORAL at 09:51

## 2024-08-05 RX ADMIN — LIDOCAINE HYDROCHLORIDE 100 MG: 20 INJECTION, SOLUTION INFILTRATION; PERINEURAL at 14:25

## 2024-08-05 RX ADMIN — SODIUM CHLORIDE, PRESERVATIVE FREE 40 MG: 5 INJECTION INTRAVENOUS at 09:52

## 2024-08-05 RX ADMIN — PROPOFOL 40 MG: 10 INJECTION, EMULSION INTRAVENOUS at 14:38

## 2024-08-05 RX ADMIN — SODIUM CHLORIDE, PRESERVATIVE FREE 10 ML: 5 INJECTION INTRAVENOUS at 09:55

## 2024-08-05 RX ADMIN — PROPOFOL 50 MG: 10 INJECTION, EMULSION INTRAVENOUS at 14:25

## 2024-08-05 RX ADMIN — METRONIDAZOLE 500 MG: 500 INJECTION, SOLUTION INTRAVENOUS at 06:50

## 2024-08-05 RX ADMIN — METRONIDAZOLE 500 MG: 500 INJECTION, SOLUTION INTRAVENOUS at 22:59

## 2024-08-05 RX ADMIN — VANCOMYCIN HYDROCHLORIDE 500 MG: 1 INJECTION, POWDER, LYOPHILIZED, FOR SOLUTION INTRAVENOUS at 05:57

## 2024-08-05 RX ADMIN — METRONIDAZOLE 500 MG: 500 INJECTION, SOLUTION INTRAVENOUS at 17:08

## 2024-08-05 RX ADMIN — ASCORBIC ACID, VITAMIN A PALMITATE, CHOLECALCIFEROL, THIAMINE HYDROCHLORIDE, RIBOFLAVIN-5 PHOSPHATE SODIUM, PYRIDOXINE HYDROCHLORIDE, NIACINAMIDE, DEXPANTHENOL, ALPHA-TOCOPHEROL ACETATE, VITAMIN K1, FOLIC ACID, BIOTIN, CYANOCOBALAMIN: 200; 3300; 200; 6; 3.6; 6; 40; 15; 10; 150; 600; 60; 5 INJECTION, SOLUTION INTRAVENOUS at 17:44

## 2024-08-05 ASSESSMENT — PAIN SCALES - GENERAL
PAINLEVEL_OUTOF10: 8
PAINLEVEL_OUTOF10: 0
PAINLEVEL_OUTOF10: 8
PAINLEVEL_OUTOF10: 0
PAINLEVEL_OUTOF10: 7
PAINLEVEL_OUTOF10: 0

## 2024-08-05 ASSESSMENT — PAIN DESCRIPTION - LOCATION
LOCATION: NECK
LOCATION: BACK
LOCATION: BACK
LOCATION: THROAT

## 2024-08-05 ASSESSMENT — PAIN DESCRIPTION - ORIENTATION
ORIENTATION: LOWER
ORIENTATION: MID;LOWER
ORIENTATION: INNER

## 2024-08-05 ASSESSMENT — PAIN - FUNCTIONAL ASSESSMENT
PAIN_FUNCTIONAL_ASSESSMENT: WONG-BAKER FACES
PAIN_FUNCTIONAL_ASSESSMENT: NONE - DENIES PAIN
PAIN_FUNCTIONAL_ASSESSMENT: NONE - DENIES PAIN

## 2024-08-05 ASSESSMENT — PAIN SCALES - WONG BAKER
WONGBAKER_NUMERICALRESPONSE: NO HURT

## 2024-08-05 ASSESSMENT — PAIN DESCRIPTION - DESCRIPTORS
DESCRIPTORS: ACHING

## 2024-08-05 ASSESSMENT — ENCOUNTER SYMPTOMS: SHORTNESS OF BREATH: 1

## 2024-08-05 NOTE — PLAN OF CARE
Problem: Safety - Adult  Goal: Free from fall injury  Note: Bed in lowest position. Bed alarm/brakes are on. Call light within reach. All patient's needs met at this time.      Problem: Chronic Conditions and Co-morbidities  Goal: Patient's chronic conditions and co-morbidity symptoms are monitored and maintained or improved  Note: BG controled, insulin glargine 5 units administered for JH=724. Sliding scale short acting on hold due to Patient's status NPO. Provider informed.      Problem: Nutrition Deficit:  Goal: Optimize nutritional status  Note: NPO     Problem: Skin/Tissue Integrity  Goal: Absence of new skin breakdown    Problem: Pain  Goal: Verbalizes/displays adequate comfort level or baseline comfort level  Note: Patient denies any pain.      Note: Patient self turn, specialty bed in place, no new skin issues.

## 2024-08-05 NOTE — PROGRESS NOTES
V2.0    [facility] Progress Note      Name:  Agustina Fried /Age/Sex: 1960  (64 y.o. female)   MRN & CSN:  [id number] & [id number] Encounter Date/Time: 2024 11:32 AM EDT   Location:  5319/5319-01 PCP: Viridiana Blanco APRN - NP     Attending:Victor M Almaguer MD       Hospital Day: 4    Assessment and Recommendations   Augstina Fried is a 64 y.o. 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      It does not look like she followed after her EGD results and she did not follow with oncology since . She was also recommended to have an EUS for evaluation of elevated alk phos and double ductal sign on imaging, but she has failed to follow-up numerous times      Pt was admitted - with acute CHF exacerbation and ESBL Ecoli UTI treated with meropenem. She presented to the ED  with  nausea, vomiting, diarrhea , abd pain and rectal bleeding. CT showed ileus with severe gastric distension. She was seen by general surgery and had NG. She was admitted to [facility]. Hb dropped from 8.1 to 6.9. She was seen by GI who did an EGD that showed duodenal malignancy resulting in obstruction . CT chest showed Patchy ground-glass opacity within the left lower lobe with superimposed  micronodularity; correlate for pneumonitis.  Metastatic disease cannot be entirely excluded. Chronic heterogeneous attenuation of thoracic and cervical vertebral bodies, as well as sternum, which could reflect metabolic bone disease or chronic metastatic disease.     Pt and family decided on  CHEST 7/30/2024 10:33 am COMPARISON: 07/01/2024 HISTORY: ORDERING SYSTEM PROVIDED HISTORY: abd pain TECHNOLOGIST PROVIDED HISTORY: Reason for exam:->abd pain Reason for Exam: abd pain, diarrhea FINDINGS: Lung volumes are low.  Patient is rotated. Heart size normal.  Mediastinal contours normal.  Pulmonary vascularity is normal There is hazy opacity seen at the left lung base, and to a lesser extent the right lung base     Hazy opacity at the lung bases, either atelectasis or pneumonia.       CBC:   Recent Labs     08/03/24  0736 08/04/24  1051 08/05/24  0547   WBC 9.7 9.0 9.3   HGB 9.6* 10.2* 10.6*    209 240       BMP:    Recent Labs     08/03/24 0736 08/04/24  1051 08/05/24  0547    140  138 138   K 4.4 2.8*  2.7* 3.7    109  108 106   CO2 25 21  20* 21   BUN 16 10  9 11   CREATININE 1.3* 1.1  1.0 1.1   GLUCOSE 237* 146*  143* 254*       Hepatic:   Recent Labs     08/05/24  0547   AST 14*   ALT 8*   BILITOT <0.2   ALKPHOS 164*     Lipids:   Lab Results   Component Value Date/Time    CHOL 154 11/27/2023 07:00 PM    HDL 75 11/27/2023 07:00 PM    TRIG 70 08/04/2024 10:51 AM     Hemoglobin A1C:   Lab Results   Component Value Date/Time    LABA1C 5.4 07/30/2024 10:03 AM     TSH:   Lab Results   Component Value Date/Time    TSH 0.86 12/11/2022 04:45 AM     Troponin: No results found for: \"TROPONINT\"  Lactic Acid: No results for input(s): \"LACTA\" in the last 72 hours.  BNP: No results for input(s): \"PROBNP\" in the last 72 hours.  UA:  Lab Results   Component Value Date/Time    NITRU Negative 07/31/2024 04:34 AM    COLORU Yellow 07/31/2024 04:34 AM    PHUR 6.0 07/31/2024 04:34 AM    PHUR 5.5 04/18/2024 04:40 PM    WBCUA 21-50 07/31/2024 04:34 AM    RBCUA 5-10 07/31/2024 04:34 AM    YEAST Present 12/10/2023 02:53 PM    BACTERIA 4+ 07/31/2024 04:34 AM    CLARITYU SL CLOUDY 07/31/2024 04:34 AM    SPECGRAV 1.010 01/06/2013 12:11 AM    LEUKOCYTESUR TRACE 07/31/2024 04:34 AM    UROBILINOGEN 0.2  07/31/2024 04:34 AM    BILIRUBINUR Negative 07/31/2024 04:34 AM    BLOODU MODERATE 07/31/2024 04:34 AM    GLUCOSEU Negative 07/31/2024 04:34 AM    GLUCOSEU >=1000 mg/dL 06/07/2010 03:38 PM    KETUA Negative 07/31/2024 04:34 AM    AMORPHOUS 3+ 04/18/2024 04:40 PM     Urine Cultures:   Lab Results   Component Value Date/Time    LABURIN  07/31/2024 04:34 AM     >100,000 CFU/ml  CONTACT PRECAUTIONS INDICATED  Carbapenem antibiotics are the best option for infections caused  by ESBL producing organisms.  Other antibiotic classes are  likely to result in treatment failure, even for organisms  demonstrating in vitro susceptibility.       Blood Cultures:   Lab Results   Component Value Date/Time    BC No Growth after 4 days of incubation. 04/17/2024 06:19 AM     Lab Results   Component Value Date/Time    BLOODCULT2 No Growth after 4 days of incubation. 04/17/2024 06:19 AM     Organism:   Lab Results   Component Value Date/Time    ORG C. difficile toxin B gene detected 08/01/2024 12:00 PM         Electronically signed by Victor M Almaguer MD on 8/5/2024 at 11:53 AM

## 2024-08-05 NOTE — PROCEDURES
EGD and colonoscopy report    Patient:  Agustina Fried                  1960    Referring Physician:  Rosina    Endoscopist: South     Indication:  duodenal mass; h/o colon surgery     Medications:  MAC      Pre-Anesthesia Assessment:  I have reviewed and am in agreement with patient history and medication, including previous response to sedation.    Prior to the procedure, a History and Physical was performed, and patient medications and allergies were reviewed. The patient is competent. The risks and benefits of the procedure and the sedation options and risks were discussed with the patient.   Risks discussed included but were not limited to infection, bleeding, perforation, death, and missed lesions.  All questions were answered and informed consent was obtained. Patient identification and proposed procedure were verified by the physician and the nurse in the pre-procedure area in the procedure room.     Mallampatti: II  ASA Grade Assessment: 3     After reviewing the risks and benefits, the patient was deemed in satisfactory condition to undergo the procedure. The anesthesia plan was to use MAC anesthesia. Immediately prior to administration of medications, the patient was re-assessed for adequacy to receive sedatives and a time out was performed. Patient and healthcare providers were in agreement it was the correct patient and procedure. The heart rate, respiratory rate, oxygen saturations, blood pressure, adequacy of pulmonary ventilation, and response to care were monitored throughout the procedure. The physical status of the patient was re-assessed after the procedure.     After obtaining informed consent, the endoscope was passed under direct vision.   The endoscope was introduced through the mouth, and advanced to the second part of duodenum. The EGD was accomplished without difficulty. The patient tolerated the procedure well.   The duodenum had external compression at the apex of the bulb; there

## 2024-08-05 NOTE — PLAN OF CARE
Problem: Nutrition Deficit:  Goal: Optimize nutritional status  8/5/2024 0044 by Sarika Xiong, RN  Outcome: Progressing  Flowsheets (Taken 8/5/2024 0044)  Nutrient intake appropriate for improving, restoring, or maintaining nutritional needs:   Assess nutritional status and recommend course of action   Monitor oral intake, labs, and treatment plans   Recommend appropriate diets, oral nutritional supplements, and vitamin/mineral supplements   Recommend, monitor, and adjust tube feedings and TPN/PPN based on assessed needs   Provide specific nutrition education to patient or family as appropriate  8/4/2024 1522 by Caitlin Rodriguez, RN  Outcome: Progressing     Problem: Skin/Tissue Integrity  Goal: Absence of new skin breakdown  Description: 1.  Monitor for areas of redness and/or skin breakdown  2.  Assess vascular access sites hourly  3.  Every 4-6 hours minimum:  Change oxygen saturation probe site  4.  Every 4-6 hours:  If on nasal continuous positive airway pressure, respiratory therapy assess nares and determine need for appliance change or resting period.  Outcome: Progressing

## 2024-08-05 NOTE — CONSULTS
----- Message from Virgilio Cristina DO sent at 12/18/2020  9:04 AM CST -----  INR is at goal.   Repeat in one month.  Please record coumadin dose in flow sheet.   Please let Violet know.    Thanks!   10.0 pack-year smoking history. She has never used smokeless tobacco.  ETOH:   reports no history of alcohol use.  Patient currently lives in a nursing home    Review of Systems -   Review of Systems: A 10 point review of systems was conducted, significant findings as notedin HPI.    Objective:          Physical Exam  Constitutional:       General: She is not in acute distress.     Appearance: She is not ill-appearing.   Cardiovascular:      Rate and Rhythm: Normal rate and regular rhythm.      Heart sounds: Normal heart sounds.   Pulmonary:      Breath sounds: Normal breath sounds.   Abdominal:      General: Bowel sounds are normal.      Palpations: Abdomen is soft.   Musculoskeletal:      Right lower leg: No edema.      Left lower leg: No edema.   Skin:     General: Skin is warm and dry.   Neurological:      Mental Status: She is alert and oriented to person, place, and time.          Palliative Performance Scale:  [] 60% Ambulation reduced; Significant disease; Can't do hobbies/housework; intake normal or reduced; occasional assist; LOC full/confusion  [x] 50% Mainly sit/lie; Extensive disease; Can't do any work; Considerable assist; intake normal  Or reduced; LOC full/confusion  [] 40% Mainly in bed; Extensive disease; Mainly assist; intake normal or reduced; occasional assist; LOC full/confusion  [] 30% Bed Bound; Extensive disease; Total care; intake reduced; LOC full/confusion  [] 20% Bed Bound; Extensive disease; Total care; intake minimal; Drowsy/coma  [] 10% Bed Bound; Extensive disease; Total care; Mouth care only; Drowsy/coma  [] 0% Death    PPS: 50    Vitals:    BP (!) 169/63   Pulse 62   Temp 97.5 °F (36.4 °C) (Axillary)   Resp 18   Ht 1.6 m (5' 2.99\")   Wt 59.4 kg (130 lb 15.3 oz)   SpO2 100%   BMI 23.20 kg/m²     Labs:    BMP:   Recent Labs     08/03/24  0736 08/04/24  1051 08/05/24  0547    140  138 138   K 4.4 2.8*  2.7* 3.7    109  108 106   CO2 25 21  20* 21   BUN 16 10  9  11   CREATININE 1.3* 1.1  1.0 1.1   GLUCOSE 237* 146*  143* 254*   PHOS 2.3* 2.0*  2.0* 3.5     CBC:   Recent Labs     08/03/24  0736 08/04/24  1051 08/05/24  0547   WBC 9.7 9.0 9.3   HGB 9.6* 10.2* 10.6*   HCT 30.0* 32.1* 32.2*    209 240       LFT's:   Recent Labs     08/05/24  0547   AST 14*   ALT 8*   BILITOT <0.2   ALKPHOS 164*     Troponin: No results for input(s): \"TROPONINI\" in the last 72 hours.  BNP: No results for input(s): \"BNP\" in the last 72 hours.  ABGs: No results for input(s): \"PHART\", \"LHD5QQF\", \"PO2ART\" in the last 72 hours.  INR:   Recent Labs     08/04/24  1051   INR 1.09       U/A:No results for input(s): \"NITRITE\", \"COLORU\", \"PHUR\", \"LABCAST\", \"WBCUA\", \"RBCUA\", \"MUCUS\", \"TRICHOMONAS\", \"YEAST\", \"BACTERIA\", \"CLARITYU\", \"SPECGRAV\", \"LEUKOCYTESUR\", \"UROBILINOGEN\", \"BILIRUBINUR\", \"BLOODU\", \"GLUCOSEU\", \"AMORPHOUS\" in the last 72 hours.    Invalid input(s): \"KETONESU\"    CT ABDOMEN PELVIS W WO CONTRAST Additional Contrast? Oral and Rectal (4 hour delay after oral contrast)   Final Result      1.  Interval development of moderate colitis along the left hemicolon which may   represent C. difficile colitis.   2.  Reported duodenal mass is not well evaluated with an ill-defined 6 cm area   of heterogeneous necrotic mass or abscess along the second portion of the   duodenum with moderate duodenal luminal narrowing. This abuts the hepatic   flexure of the colon and may represent sequelae of malignancy or infection with   possible fistula or abscess formation, not well evaluated. Recommend correlation   with EGD.         Electronically signed by Umer Tezel      XR ABDOMEN (2 VIEWS)   Final Result      Bowel gas pattern unremarkable. Sutures are seen in the right mid upper abdomen. There are surgical clips present. Enteric tube in the proximal stomach.      Electronically signed by Yonatan Duffy      XR ABDOMEN (KUB) (SINGLE AP VIEW)   Final Result   Impression: Satisfactory positioning of nasogastric  tube.      Electronically signed by Carlos Waterman MD            Conclusion/Time spent:         Recommendations see above    Time spent with patient and/or family: 20  Time reviewing records: 10 min   Time communicating with staff: 5 min     A total of 35 minutes spent with the patient and family on unit greater than 50% in counseling regarding palliative care and in goals of care for the patient.    Thank you to Dr. Isbell/SARTHAK Jaramillo for this consultation. We will continue to follow Ms. Fried's care as needed.      Sophia DE LEÓN  Inpatient Palliative Care  [phone]

## 2024-08-05 NOTE — ANESTHESIA PRE PROCEDURE
Department of Anesthesiology  Preprocedure Note       Name:  Agustina Fried   Age:  64 y.o.  :  1960                                          MRN:  [id number]         Date:  2024      Surgeon: Surgeon(s):  Guzman Guaman MD    Procedure: Procedure(s):  ESOPHAGOGASTRODUODENOSCOPY  COLONOSCOPY DIAGNOSTIC    Medications prior to admission:   Prior to Admission medications    Medication Sig Start Date End Date Taking? Authorizing Provider   oxyCODONE-acetaminophen (PERCOCET) 5-325 MG per tablet Take 1 tablet by mouth every 8 hours as needed for Pain. Max Daily Amount: 3 tablets    Provider, MD Imani   loperamide (IMODIUM) 2 MG capsule Take 1 capsule by mouth 4 times daily as needed for Diarrhea    Imani Brady MD   acetaminophen (TYLENOL) 325 MG tablet Take 2 tablets by mouth every 6 hours as needed for Pain    Imani Brady MD   torsemide (DEMADEX) 10 MG tablet Take 1 tablet by mouth daily 24   Charlie Gunn MD   gabapentin (NEURONTIN) 100 MG capsule Take 2 capsules by mouth 2 times daily for 30 days. 24  Charlie Gunn MD   pantoprazole (PROTONIX) 40 MG tablet Take 1 tablet by mouth 2 times daily at 0800 and 1400 24   Ankur Douglas MD   traZODone (DESYREL) 100 MG tablet Take 1 tablet by mouth nightly 24   Ankur Douglas MD   insulin glargine (LANTUS) 100 UNIT/ML injection vial Inject 8 Units into the skin daily 24   Ankur Douglas MD   amLODIPine (NORVASC) 5 MG tablet Take 1 tablet by mouth daily 24   Ankur Douglas MD   Continuous Blood Gluc Sensor (FREESTYLE CHRISTY 2 SENSOR) MISC Replace every 2 weeks 24   Anny Franklin MD   insulin lispro, 1 Unit Dial, (HUMALOG KWIKPEN) 100 UNIT/ML SOPN Inject 0-4 Units into the skin 3 times daily (before meals) 24   Anny Franklin MD   atorvastatin (LIPITOR) 40 MG tablet Take 1 tablet by mouth nightly 23   Margoth Corado SARTHAK - CNP   Blood Glucose

## 2024-08-05 NOTE — ANESTHESIA POSTPROCEDURE EVALUATION
Department of Anesthesiology  Postprocedure Note    Patient: Agustina Fried  MRN: [id number]  YOB: 1960  Date of evaluation: 8/5/2024    Procedure Summary       Date: 08/05/24 Room / Location: [place] [address] / [facility]    Anesthesia Start: 1421 Anesthesia Stop: 1447    Procedures:       ESOPHAGOGASTRODUODENOSCOPY      COLONOSCOPY DIAGNOSTIC Diagnosis:       Small bowel obstruction (HCC)      Rectal bleeding      (Small bowel obstruction (HCC) [K56.609])      (Rectal bleeding [K62.5])    Surgeons: Guzman Guaman MD Responsible Provider: Kiya White DO    Anesthesia Type: MAC ASA Status: 3            Anesthesia Type: MAC    Marnie Phase I: Marnie Score: 10    Marnie Phase II: Marnie Score: 10    Anesthesia Post Evaluation    Patient location during evaluation: PACU  Patient participation: complete - patient participated  Level of consciousness: awake and alert  Airway patency: patent  Nausea & Vomiting: no nausea and no vomiting  Cardiovascular status: blood pressure returned to baseline  Respiratory status: acceptable  Hydration status: euvolemic  Pain management: adequate    There were no known notable events for this encounter.

## 2024-08-05 NOTE — PROGRESS NOTES
[facility] -  Clinical Pharmacy Note    Parenteral Nutrition - Management by Pharmacy    Consult Date(s): 8/4/24  Consulting Provider(s): Dr Butler    Assessment / Plan  1)  Gastric outlet obstruction 2/2 ulcerated mass, ileus - Parenteral Nutrition  Day of Therapy: 2  Formulation:  Clinimix E 5/20  Clinimix Rate:  55 mL/hr (Total volume = 1320 mL/day) per day #2 RD recommendations  If electrolytes stable tomorrow, will advance to goal rate of 70 mL/hr per RD note 8/3/24.  Lipids:  20% 250mL over 12 hours on Mon & Thurs only (due to national shortage) - start today  Appreciate Clinical Dietitian's recommendations for formulation and goal rate.  Electrolytes:  Clinimix-E includes standard electrolyte formulation.  Recommend further repletion with supplemental IVPBs as needed.  Maintenance IVF:  NS with 20 mEq KCl / L @ 100 mL/hr  Glucose control:    Pt has h/o DM.  Takes Lantus 8 units daily + Humalog 4 units TID with meals at home.  Glucoses ranged 220-251 since TPN started last evening.  Used 8 units SSI.  Will add Regular insulin 10 units / 24 hrs to TPN bag for this evening.    Will monitor glucoses and adjust insulin in TPN bag as needed - will likely need increase tomorrow.     Add MVI 10 mL/day in PN on Mon-Wed-Fri only (due to national shortage) & Trace Elements 1 mL/day in PN daily.  Daily renal panel, daily magnesium, and weekly triglycerides ordered per protocol.  PN will be re-ordered daily.    Please call with questions--  Thanks--  Valorie Tam, PharmD, BCPS, BCGP  [id number] ([facility])   8/5/2024 10:54 AM      Interval update:  PICC placed yesterday, and TPN started last evening.  Monitoring NG output.  GI planning to attempt upper endoscopy / colonoscopy today.  Remains on Meropenem for ESBL E.coli UTI, and remains on Metronidazole IV + Vancomycin MN for C.diff colitis.    Subjective/Objective:   Agustina Fried is a 64 y.o. female with a PMHx significant for CAD, HTN, HLD, HFpEF, T2DM,

## 2024-08-05 NOTE — PROGRESS NOTES
General Surgery   Daily Progress Note  Patient: Agustina Fried      CC: Duodenal mass    SUBJECTIVE:   NAEON. Patient reports no flatulence, no n/v. Soft BM over night per nurse.    ROS:   A 14 point review of systems was conducted, significant findings as noted above. All other systems negative.    OBJECTIVE:    PHYSICAL EXAM:    Vitals:    08/05/24 0005 08/05/24 0202 08/05/24 0537 08/05/24 0725   BP: (!) 172/68  (!) 175/80 (!) 169/63   Pulse: 62  62 62   Resp: 16 16 18 18   Temp: 98.4 °F (36.9 °C)  98 °F (36.7 °C) 97.5 °F (36.4 °C)   TempSrc: Oral  Oral Axillary   SpO2: 98%  100% 100%   Weight:       Height:           General appearance: Alert, no acute distress  Eyes: No scleral icterus, EOM grossly intact  Neck: Trachea midline, no JVD  Chest/Lungs: Normal effort with no accessory muscle use on RA  Cardiovascular: RRR, well perfused  Abdomen: Soft, non-distended, non-tender, no rebound, guarding, or rigidity. Well-healed midline ex lap scar; NGT in place.  Skin: Warm and dry, no rashes  Extremities: No edema, no cyanosis  Neuro: A&Ox3, no focal deficits, sensation intact      ASSESSMENT & PLAN:   This is a 64 y.o. female with Hx of HTN, DM, GERD, hyperlipidemia, CKD stage IIIb and surgical history significant for Wilms tumor s/p right nephrectomy (age 8), breast cancer s/p radiation and lumpectomy (09/2014), invasive adenocarcinoma s/p R colectomy (03/2023) who presented with gastric outlet obstruction symptoms due to large ulcerated mass in the second portion of duodenum.     - Continue to monitor NGT output  - Continue TPN   - Continue Abx  - Reach out to primary team for management of C diff  - Further care per primary.       Cindy Owusu MD  PGY1, General Surgery  08/05/24  8:28 AM  [phone]

## 2024-08-05 NOTE — PROGRESS NOTES
V2.0    [facility] [place]      Name:  Agustina Fried /Age/Sex: 1960  (64 y.o. female)   MRN & CSN:  [id number] & [id number] Encounter Date/Time: 2024 11:32 AM EDT   Location:  5319/5319-01 PCP: Viridiana Blanco APRN - NP     Attending:Victor M Almaguer MD       Hospital Day: 4    Assessment and Recommendations   Agustina Fried is a 64 y.o. 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      It does not look like she followed after her EGD results and she did not follow with oncology since . She was also recommended to have an EUS for evaluation of elevated alk phos and double ductal sign on imaging, but she has failed to follow-up numerous times      Pt was admitted - with acute CHF exacerbation and ESBL Ecoli UTI treated with meropenem. She presented to the ED  with  nausea, vomiting, diarrhea , abd pain and rectal bleeding. CT showed ileus with severe gastric distension. She was seen by general surgery and had NG. She was admitted to [facility]. Hb dropped from 8.1 to 6.9. She was seen by GI who did an EGD that showed duodenal malignancy resulting in obstruction . CT chest showed Patchy ground-glass opacity within the left lower lobe with superimposed  micronodularity; correlate for pneumonitis.  Metastatic disease cannot be entirely excluded. Chronic heterogeneous attenuation of thoracic and cervical vertebral bodies, as well as sternum, which could reflect metabolic bone disease or chronic metastatic disease.     Pt and family decided on  kidney and adrenals are unremarkable aside from chronic dystrophic pancreatic calcification and a few bubbles of pneumobilia..  The right kidney is surgically absent. GI/Bowel: There is severe distension of the stomach with extensive retained ingested material.  There is moderate fluid-filled/gaseous distention of both large and small bowel throughout the abdomen and pelvis.  There is no obvious bowel wall thickening or Pelvis: The uterus is surgically absent.  The bladder is grossly unremarkable. Peritoneum/Retroperitoneum: There is no free air, free fluid or intraperitoneal inflammatory change.  There is no adenopathy. Bones/Soft Tissues: There is no acute fracture or aggressive osseous lesion.     Moderate ileus with severe gastric distension.     XR CHEST PORTABLE    Result Date: 7/30/2024  EXAMINATION: ONE XRAY VIEW OF THE CHEST 7/30/2024 10:33 am COMPARISON: 07/01/2024 HISTORY: ORDERING SYSTEM PROVIDED HISTORY: abd pain TECHNOLOGIST PROVIDED HISTORY: Reason for exam:->abd pain Reason for Exam: abd pain, diarrhea FINDINGS: Lung volumes are low.  Patient is rotated. Heart size normal.  Mediastinal contours normal.  Pulmonary vascularity is normal There is hazy opacity seen at the left lung base, and to a lesser extent the right lung base     Hazy opacity at the lung bases, either atelectasis or pneumonia.       CBC:   Recent Labs     08/03/24  0736 08/04/24  1051 08/05/24  0547   WBC 9.7 9.0 9.3   HGB 9.6* 10.2* 10.6*    209 240       BMP:    Recent Labs     08/03/24  0736 08/04/24  1051 08/05/24  0547    140  138 138   K 4.4 2.8*  2.7* 3.7    109  108 106   CO2 25 21  20* 21   BUN 16 10  9 11   CREATININE 1.3* 1.1  1.0 1.1   GLUCOSE 237* 146*  143* 254*       Hepatic:   Recent Labs     08/05/24  0547   AST 14*   ALT 8*   BILITOT <0.2   ALKPHOS 164*       Lipids:   Lab Results   Component Value Date/Time    CHOL 154 11/27/2023 07:00 PM    HDL 75 11/27/2023 07:00 PM    TRIG 70 08/04/2024

## 2024-08-05 NOTE — CARE COORDINATION
Case Management Assessment  Initial Evaluation    Date/Time of Evaluation: 8/5/2024 1:56 PM  Assessment Completed by: Elaina Rutherford RN    If patient is discharged prior to next notation, then this note serves as note for discharge by case management.    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 Yes    Chart Reviewed: Yes      History Provided by: Patient  Patient Orientation: Alert and Oriented, Person, Place, Situation, Self    Patient Cognition: Alert    Hospitalization in the last 30 days (Readmission):  No    If yes, Readmission Assessment in CM Navigator will be completed.    Advance Directives:      Code Status: Full Code   Patient's Primary Decision Maker is: Legal Next of Kin    Primary Decision Maker: Maurice Fried - Child - [phone]    Secondary Decision Maker: Moi Fried - Brother/Sister - [phone]    Supplemental (Other) Decision Maker: PEMA DIANE - Other - [phone]    Discharge Planning:    Patient lives with: Other (Comment) (snf) Type of Home: Skilled Nursing Facility  Primary Care Giver: Self  Patient Support Systems include: Children, Family Members   Current Financial resources: Medicaid  Current community resources: None  Current services prior to admission: Skilled Nursing Facility            Current DME:              Type of Home Care services:  None    ADLS  Prior functional level: Assistance with the following:, Housework, Cooking, Shopping, Mobility  Current functional level: Assistance with the following:, Cooking, Housework, Shopping, Mobility    PT AM-PAC:   /24  OT AM-PAC:   /24    Family can provide assistance at DC: Yes  Would you like Case  Management to discuss the discharge plan with any other family members/significant others, and if so, who? Yes (Son-Maurice)  Plans to Return to Present Housing: Unknown at present  Other Identified Issues/Barriers to RETURNING to current housing: TBD  Potential Assistance needed at discharge: Home Care, Skilled Nursing Facility            Potential DME:    Patient expects to discharge to: Skilled nursing facility  Plan for transportation at discharge: Self    Financial    Payor: [employer] MEDICA[place] / Plan: [facility] MEDICA / Product Type: *No Product type* /     Does insurance require precert for SNF: Yes    Potential assistance Purchasing Medications: No  Meds-to-Beds request:        [facility] - [address] - [place], [place] - [address] - P [phone] - F [phone]  [address]  [address]  [facility] [address]  Phone: [phone] Fax: [phone]    [id number] - [place], [place] - [address] - P [phone] - F [phone]  [address] [facility] [address]  Phone: [phone] Fax: [phone]      Notes:    Factors facilitating achievement of predicted outcomes: Family support, Motivated, Cooperative, and Pleasant    Barriers to discharge: Pain and NPO with NG to LCWS, to endo for EGD and colonoscopy, TPN, will need PT/OT evals for placement.     Additional Case Management Notes: Patient admitted from [facility] and would like to return there at d/c. They are able to accept as long as she is not getting chemo, radiation or on TPN. I called her son, Maurice to update him, per patient's request. She will need precert for placement.     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 Freedom of  choice list with basic dialogue that supports the patient's individualized plan of care/goals and shares the quality data associated with the providers was provided to: Patient   Patient Representative Name:       The Patient and/or Patient Representative Agree with the Discharge Plan? Yes    Elaina Rutherford RN  Case Management Department  Ph: [phone] Fax: [phone]

## 2024-08-05 NOTE — PROGRESS NOTES
Pre-operative History and Physical    Patient: Agustina Fried  : 1960     History Obtained From:  patient, electronic medical record    HISTORY OF PRESENT ILLNESS:    The patient is a 64 y.o. female who presents for an EGD and colonoscopy for duodenal mass and h/o colon lesion.   Past Medical History:        Diagnosis Date    Abnormal brain MRI 2017    Partially empty sella and minimal chronic small vessel ischemic disease    Acute bilateral low back pain without sciatica 2016    SHARRON (acute kidney injury) ([facility]) 2017    Arthritis     back    Bipolar disorder ([facility]) 10/18/2008    CAD (coronary artery disease)     stent placed 20    Cancer ([facility])     bilateral breast:s/p lumpectomy/radiation:under care care of breast specialist:Dr. Boone     Carotid stenosis, bilateral:<50%:per US 2016 7/15/2016    Carpal tunnel syndrome 10/18/2008    Cervical cancer screening     Nml per pt'.    Coronary artery disease of native artery of native heart with stable angina pectoris ([facility]) 2020    DDD (degenerative disc disease), lumbar 2018    Depression     under care of pschiatrist:Dr. Rojas    Depression/anxiety 2017    Depression/anxiety     Diabetes mellitus (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) 2017    Microalbuminuria 2016    Neuropathy in diabetes ([facility])     Non morbid obesity 2016    Pancreatitis 16    [facility] hospitalization 16-16:under care of GI:chronic pancreatitis    S/P endoscopy 2016    [facility]-[place]:per pt' & her family member was nml.    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 myelopathy or radiculopathy 3/10/2017    Transient cerebral ischemia 07/15/2016    TIA:7/10/16    Unspecified cerebral artery occlusion

## 2024-08-05 NOTE — PROGRESS NOTES
NP PAGE via [employer]:       Pt with NGT requesting spray for sore throat. Please advise. Thank you!

## 2024-08-05 NOTE — PROGRESS NOTES
Ph: [phone], Fax: [phone]           [facility]               Reason for admission:                 Pain abdomen nausea and vomiting    Brief Summary :     Agustina Fried is being seen by nephrology for  pain abdomen nausea and vomiting.      Interval History and plan:   Patient seen at bedside  Comfortable  /63  On room air and denied SOB  Urine output ?  NG aspirate 800 ml  Cr better 1.1  Mag 2.7        On IVF  Follow BP and PRN Hydralazine.   Replete lytes as needed                         Assessment :       CKD Stage IIIa  Creatinine has been variable in the past she has a history of recurrent SHARRON  Last creatinine was 2 upon discharge from 7/2 hospitalization  she has history of diabetes mellitus hypertension      Generalized Edema  She was discharged on 10 mg of torsemide last hospital stay   has diastolic dysfunction      Hypertension   BP: (169-175)/(63-80)  Pulse:  [62]   BP goal inpatient 130-140 systolic inpatient          [facility] would like to thank Victor M Almaguer MD   for opportunity to serve this patient      Please call with questions at-   24 Hrs Answering service [phone] or  7 am- 5 pm via Perfect serve or cell phone  Dr.Muhammad MARINA Trimble MD       HPI :     Agustina Fried is a 64 y.o. female presented to   the hospital on 8/2/2024 with  pain abdomen nausea and vomiting.  She is known to have nausea vomiting diarrhea for several days because of which she came to the hospital.  She needed NG tube placement in the emergency room.  Found to have severe gastric distention.  She is known to have CKD and has had multiple hospitalization in the past        PMH/PSH/SH/Family History:     Past Medical History:   Diagnosis Date    Abnormal brain MRI 7/20/2017    Partially empty sella and minimal chronic small vessel ischemic disease    Acute bilateral low back pain without sciatica 11/2/2016    SHARRON (acute kidney injury) (HCC) 7/5/2017    Arthritis     back  sodium chloride (PF) 0.9 % 10 mL injection, 40 mg, IntraVENous, Daily  [Held by provider] enoxaparin (LOVENOX) injection 40 mg, 40 mg, SubCUTAneous, Daily  thiamine (B-1) injection 100 mg, 100 mg, IntraVENous, Daily  PN-Adult Premix 5/15 - Standard Electrolytes - Central Line, , IntraVENous, Continuous TPN  diatrizoate meglumine-sodium (GASTROGRAFIN) 66-10 % solution 30 mL, 30 mL, Rectal, ONCE PRN  diatrizoate meglumine-sodium (GASTROGRAFIN) 66-10 % solution 30 mL, 30 mL, Per NG tube, ONCE PRN  HYDROmorphone (DILAUDID) injection 0.25 mg, 0.25 mg, IntraVENous, Q3H PRN **OR** HYDROmorphone (DILAUDID) injection 0.5 mg, 0.5 mg, IntraVENous, Q3H PRN  vancomycin 500 mg in sodium chloride 0.9% 100 mL enema, 500 mg, Rectal, Q6H  cloNIDine (CATAPRES) 0.1 MG/24HR 1 patch, 1 patch, TransDERmal, Weekly  insulin lispro (HUMALOG,ADMELOG) injection vial 0-8 Units, 0-8 Units, SubCUTAneous, Q6H  meropenem (MERREM) 1,000 mg in sodium chloride 0.9 % 100 mL IVPB (mini-bag), 1,000 mg, IntraVENous, Q12H  sodium chloride flush 0.9 % injection 5-40 mL, 5-40 mL, IntraVENous, 2 times per day  sodium chloride flush 0.9 % injection 5-40 mL, 5-40 mL, IntraVENous, PRN  0.9 % sodium chloride infusion, , IntraVENous, PRN  ondansetron (ZOFRAN-ODT) disintegrating tablet 4 mg, 4 mg, Oral, Q8H PRN **OR** ondansetron (ZOFRAN) injection 4 mg, 4 mg, IntraVENous, Q6H PRN  polyethylene glycol (GLYCOLAX) packet 17 g, 17 g, Oral, Daily PRN  acetaminophen (TYLENOL) tablet 650 mg, 650 mg, Oral, Q6H PRN **OR** acetaminophen (TYLENOL) suppository 650 mg, 650 mg, Rectal, Q6H PRN  glucose chewable tablet 16 g, 4 tablet, Oral, PRN  dextrose bolus 10% 125 mL, 125 mL, IntraVENous, PRN **OR** dextrose bolus 10% 250 mL, 250 mL, IntraVENous, PRN  glucagon injection 1 mg, 1 mg, SubCUTAneous, PRN  dextrose 10 % infusion, , IntraVENous, Continuous PRN    Vitals :     Vitals:    08/05/24 0725   BP: (!) 169/63   Pulse: 62   Resp: 18   Temp: 97.5 °F (36.4 °C)   SpO2: 100%

## 2024-08-06 LAB
ALBUMIN SERPL-MCNC: 2.1 G/DL (ref 3.4–5)
ALP SERPL-CCNC: 154 U/L (ref 40–129)
ALT SERPL-CCNC: 9 U/L (ref 10–40)
ANION GAP SERPL CALCULATED.3IONS-SCNC: 7 MMOL/L (ref 3–16)
ANION GAP SERPL CALCULATED.3IONS-SCNC: 8 MMOL/L (ref 3–16)
AST SERPL-CCNC: 12 U/L (ref 15–37)
BILIRUB DIRECT SERPL-MCNC: <0.2 MG/DL (ref 0–0.3)
BILIRUB INDIRECT SERPL-MCNC: ABNORMAL MG/DL (ref 0–1)
BILIRUB SERPL-MCNC: <0.2 MG/DL (ref 0–1)
BUN SERPL-MCNC: 14 MG/DL (ref 7–20)
CALCIUM SERPL-MCNC: 7.2 MG/DL (ref 8.3–10.6)
CANCER AG19-9 SERPL IA-ACNC: 39 U/ML (ref 0–35)
CHLORIDE SERPL-SCNC: 108 MMOL/L (ref 99–110)
CHLORIDE SERPL-SCNC: 108 MMOL/L (ref 99–110)
CO2 SERPL-SCNC: 21 MMOL/L (ref 21–32)
CO2 SERPL-SCNC: 21 MMOL/L (ref 21–32)
CREAT SERPL-MCNC: 1.2 MG/DL (ref 0.6–1.2)
CRP SERPL-MCNC: 28.4 MG/L (ref 0–5.1)
DEPRECATED RDW RBC AUTO: 14.6 % (ref 12.4–15.4)
GFR SERPLBLD CREATININE-BSD FMLA CKD-EPI: 50 ML/MIN/{1.73_M2}
GLUCOSE BLD-MCNC: 172 MG/DL (ref 70–99)
GLUCOSE BLD-MCNC: 219 MG/DL (ref 70–99)
GLUCOSE BLD-MCNC: 234 MG/DL (ref 70–99)
GLUCOSE BLD-MCNC: 270 MG/DL (ref 70–99)
GLUCOSE BLD-MCNC: 292 MG/DL (ref 70–99)
GLUCOSE BLD-MCNC: 307 MG/DL (ref 70–99)
GLUCOSE SERPL-MCNC: 240 MG/DL (ref 70–99)
HCT VFR BLD AUTO: 27.3 % (ref 36–48)
HGB BLD-MCNC: 9 G/DL (ref 12–16)
MAGNESIUM SERPL-MCNC: 2.3 MG/DL (ref 1.8–2.4)
MCH RBC QN AUTO: 29.3 PG (ref 26–34)
MCHC RBC AUTO-ENTMCNC: 32.9 G/DL (ref 31–36)
MCV RBC AUTO: 89 FL (ref 80–100)
PERFORMED ON: ABNORMAL
PHOSPHATE SERPL-MCNC: 2.4 MG/DL (ref 2.5–4.9)
PLATELET # BLD AUTO: 194 K/UL (ref 135–450)
PMV BLD AUTO: 8.9 FL (ref 5–10.5)
POTASSIUM SERPL-SCNC: 3.1 MMOL/L (ref 3.5–5.1)
POTASSIUM SERPL-SCNC: 3.2 MMOL/L (ref 3.5–5.1)
PREALB SERPL-MCNC: 6.1 MG/DL (ref 20–40)
PROT SERPL-MCNC: 6 G/DL (ref 6.4–8.2)
RBC # BLD AUTO: 3.07 M/UL (ref 4–5.2)
SODIUM SERPL-SCNC: 136 MMOL/L (ref 136–145)
SODIUM SERPL-SCNC: 137 MMOL/L (ref 136–145)
WBC # BLD AUTO: 8.4 K/UL (ref 4–11)

## 2024-08-06 PROCEDURE — 84134 ASSAY OF PREALBUMIN: CPT

## 2024-08-06 PROCEDURE — 6370000000 HC RX 637 (ALT 250 FOR IP)

## 2024-08-06 PROCEDURE — 86140 C-REACTIVE PROTEIN: CPT

## 2024-08-06 PROCEDURE — 2580000003 HC RX 258: Performed by: HOSPITALIST

## 2024-08-06 PROCEDURE — 6370000000 HC RX 637 (ALT 250 FOR IP): Performed by: INTERNAL MEDICINE

## 2024-08-06 PROCEDURE — 80076 HEPATIC FUNCTION PANEL: CPT

## 2024-08-06 PROCEDURE — 6360000002 HC RX W HCPCS: Performed by: INTERNAL MEDICINE

## 2024-08-06 PROCEDURE — 2580000003 HC RX 258: Performed by: INTERNAL MEDICINE

## 2024-08-06 PROCEDURE — 80069 RENAL FUNCTION PANEL: CPT

## 2024-08-06 PROCEDURE — 99233 SBSQ HOSP IP/OBS HIGH 50: CPT | Performed by: SURGERY

## 2024-08-06 PROCEDURE — 6370000000 HC RX 637 (ALT 250 FOR IP): Performed by: HOSPITALIST

## 2024-08-06 PROCEDURE — 80051 ELECTROLYTE PANEL: CPT

## 2024-08-06 PROCEDURE — 1200000000 HC SEMI PRIVATE

## 2024-08-06 PROCEDURE — 6360000002 HC RX W HCPCS: Performed by: HOSPITALIST

## 2024-08-06 PROCEDURE — 2580000003 HC RX 258: Performed by: STUDENT IN AN ORGANIZED HEALTH CARE EDUCATION/TRAINING PROGRAM

## 2024-08-06 PROCEDURE — 83735 ASSAY OF MAGNESIUM: CPT

## 2024-08-06 PROCEDURE — 2500000003 HC RX 250 WO HCPCS

## 2024-08-06 PROCEDURE — 84478 ASSAY OF TRIGLYCERIDES: CPT

## 2024-08-06 PROCEDURE — 85027 COMPLETE CBC AUTOMATED: CPT

## 2024-08-06 PROCEDURE — 6360000002 HC RX W HCPCS: Performed by: STUDENT IN AN ORGANIZED HEALTH CARE EDUCATION/TRAINING PROGRAM

## 2024-08-06 PROCEDURE — 2500000003 HC RX 250 WO HCPCS: Performed by: STUDENT IN AN ORGANIZED HEALTH CARE EDUCATION/TRAINING PROGRAM

## 2024-08-06 RX ORDER — INSULIN GLARGINE 100 [IU]/ML
10 INJECTION, SOLUTION SUBCUTANEOUS NIGHTLY
Status: DISCONTINUED | OUTPATIENT
Start: 2024-08-07 | End: 2024-08-09

## 2024-08-06 RX ORDER — POTASSIUM CHLORIDE 7.45 MG/ML
10 INJECTION INTRAVENOUS
Status: COMPLETED | OUTPATIENT
Start: 2024-08-06 | End: 2024-08-06

## 2024-08-06 RX ADMIN — POTASSIUM CHLORIDE 10 MEQ: 10 INJECTION, SOLUTION INTRAVENOUS at 10:02

## 2024-08-06 RX ADMIN — VANCOMYCIN HYDROCHLORIDE 500 MG: 1 INJECTION, POWDER, LYOPHILIZED, FOR SOLUTION INTRAVENOUS at 12:17

## 2024-08-06 RX ADMIN — HYDROMORPHONE HYDROCHLORIDE 0.5 MG: 1 INJECTION, SOLUTION INTRAMUSCULAR; INTRAVENOUS; SUBCUTANEOUS at 14:42

## 2024-08-06 RX ADMIN — HYDROMORPHONE HYDROCHLORIDE 0.5 MG: 1 INJECTION, SOLUTION INTRAMUSCULAR; INTRAVENOUS; SUBCUTANEOUS at 01:04

## 2024-08-06 RX ADMIN — SODIUM CHLORIDE, PRESERVATIVE FREE 10 ML: 5 INJECTION INTRAVENOUS at 08:18

## 2024-08-06 RX ADMIN — VANCOMYCIN HYDROCHLORIDE 500 MG: 1 INJECTION, POWDER, LYOPHILIZED, FOR SOLUTION INTRAVENOUS at 21:36

## 2024-08-06 RX ADMIN — SODIUM PHOSPHATE, MONOBASIC, MONOHYDRATE AND SODIUM PHOSPHATE, DIBASIC, ANHYDROUS 10 MMOL: 142; 276 INJECTION, SOLUTION INTRAVENOUS at 09:54

## 2024-08-06 RX ADMIN — POTASSIUM CHLORIDE 10 MEQ: 10 INJECTION, SOLUTION INTRAVENOUS at 09:08

## 2024-08-06 RX ADMIN — VANCOMYCIN HYDROCHLORIDE 500 MG: 1 INJECTION, POWDER, LYOPHILIZED, FOR SOLUTION INTRAVENOUS at 04:30

## 2024-08-06 RX ADMIN — METRONIDAZOLE 500 MG: 500 INJECTION, SOLUTION INTRAVENOUS at 06:23

## 2024-08-06 RX ADMIN — METRONIDAZOLE 500 MG: 500 INJECTION, SOLUTION INTRAVENOUS at 23:15

## 2024-08-06 RX ADMIN — SODIUM CHLORIDE, PRESERVATIVE FREE 40 MG: 5 INJECTION INTRAVENOUS at 21:35

## 2024-08-06 RX ADMIN — TRACE ELEMENTS INJECTION 4: 7.4; .75; 98; 151 INJECTION, SOLUTION INTRAVENOUS at 18:55

## 2024-08-06 RX ADMIN — THIAMINE HYDROCHLORIDE 100 MG: 100 INJECTION, SOLUTION INTRAMUSCULAR; INTRAVENOUS at 08:16

## 2024-08-06 RX ADMIN — SODIUM CHLORIDE, PRESERVATIVE FREE 40 MG: 5 INJECTION INTRAVENOUS at 08:16

## 2024-08-06 RX ADMIN — ACETAMINOPHEN 650 MG: 650 SUPPOSITORY RECTAL at 04:26

## 2024-08-06 RX ADMIN — VANCOMYCIN HYDROCHLORIDE 500 MG: 1 INJECTION, POWDER, LYOPHILIZED, FOR SOLUTION INTRAVENOUS at 16:45

## 2024-08-06 RX ADMIN — INSULIN GLARGINE 5 UNITS: 100 INJECTION, SOLUTION SUBCUTANEOUS at 08:15

## 2024-08-06 RX ADMIN — SODIUM CHLORIDE, PRESERVATIVE FREE 10 ML: 5 INJECTION INTRAVENOUS at 21:35

## 2024-08-06 RX ADMIN — INSULIN LISPRO 8 UNITS: 100 INJECTION, SOLUTION INTRAVENOUS; SUBCUTANEOUS at 00:59

## 2024-08-06 RX ADMIN — METRONIDAZOLE 500 MG: 500 INJECTION, SOLUTION INTRAVENOUS at 14:26

## 2024-08-06 RX ADMIN — INSULIN LISPRO 4 UNITS: 100 INJECTION, SOLUTION INTRAVENOUS; SUBCUTANEOUS at 06:18

## 2024-08-06 RX ADMIN — POTASSIUM CHLORIDE AND SODIUM CHLORIDE: 900; 150 INJECTION, SOLUTION INTRAVENOUS at 11:40

## 2024-08-06 RX ADMIN — INSULIN LISPRO 12 UNITS: 100 INJECTION, SOLUTION INTRAVENOUS; SUBCUTANEOUS at 19:54

## 2024-08-06 RX ADMIN — MEROPENEM 1000 MG: 1 INJECTION INTRAVENOUS at 04:15

## 2024-08-06 RX ADMIN — SODIUM CHLORIDE, PRESERVATIVE FREE 10 ML: 5 INJECTION INTRAVENOUS at 09:09

## 2024-08-06 RX ADMIN — MEROPENEM 1000 MG: 1 INJECTION INTRAVENOUS at 16:39

## 2024-08-06 ASSESSMENT — PAIN DESCRIPTION - ORIENTATION
ORIENTATION: LOWER;MID
ORIENTATION: MID

## 2024-08-06 ASSESSMENT — PAIN SCALES - WONG BAKER
WONGBAKER_NUMERICALRESPONSE: NO HURT
WONGBAKER_NUMERICALRESPONSE: NO HURT
WONGBAKER_NUMERICALRESPONSE: HURTS A LITTLE BIT
WONGBAKER_NUMERICALRESPONSE: NO HURT

## 2024-08-06 ASSESSMENT — PAIN DESCRIPTION - ONSET: ONSET: ON-GOING

## 2024-08-06 ASSESSMENT — PAIN SCALES - GENERAL
PAINLEVEL_OUTOF10: 0
PAINLEVEL_OUTOF10: 9
PAINLEVEL_OUTOF10: 0
PAINLEVEL_OUTOF10: 0
PAINLEVEL_OUTOF10: 9
PAINLEVEL_OUTOF10: 0

## 2024-08-06 ASSESSMENT — PAIN DESCRIPTION - PAIN TYPE: TYPE: CHRONIC PAIN

## 2024-08-06 ASSESSMENT — PAIN DESCRIPTION - LOCATION
LOCATION: BACK
LOCATION: ABDOMEN;BACK

## 2024-08-06 ASSESSMENT — PAIN DESCRIPTION - DESCRIPTORS
DESCRIPTORS: ACHING
DESCRIPTORS: ACHING

## 2024-08-06 ASSESSMENT — PAIN DESCRIPTION - FREQUENCY: FREQUENCY: INTERMITTENT

## 2024-08-06 ASSESSMENT — PAIN - FUNCTIONAL ASSESSMENT: PAIN_FUNCTIONAL_ASSESSMENT: PREVENTS OR INTERFERES SOME ACTIVE ACTIVITIES AND ADLS

## 2024-08-06 NOTE — PROGRESS NOTES
ONCOLOGY HEMATOLOGY CARE PROGRESS NOTE      SUBJECTIVE:    Today she is seen in bed, no family at bedside. She is sleeping but is arousable to voice. Her speech is somewhat garbled as she remains sleepy. She tells me that she lives at home and she comes to her MD appointments in a cab.       OBJECTIVE        Physical    VITALS:  Patient Vitals for the past 24 hrs:   BP Temp Temp src Pulse Resp SpO2 Weight   08/06/24 0722 (!) 164/64 98 °F (36.7 °C) Oral 65 18 97 % --   08/06/24 0538 -- -- -- -- -- -- 76 kg (167 lb 8.8 oz)   08/06/24 0418 (!) 163/71 (!) 100.6 °F (38.1 °C) Oral 69 18 99 % --   08/06/24 0104 -- -- -- -- 18 -- --   08/05/24 2345 (!) 185/79 98.8 °F (37.1 °C) Oral 83 18 98 % --   08/05/24 2047 (!) 176/86 98 °F (36.7 °C) Oral 70 18 97 % --   08/05/24 1534 (!) 165/71 97.3 °F (36.3 °C) Axillary 61 18 100 % --   08/05/24 1516 (!) 165/77 -- -- -- -- -- --   08/05/24 1511 (!) 146/70 -- -- 67 17 100 % --   08/05/24 1500 129/73 -- -- 79 17 100 % --   08/05/24 1450 (!) 99/51 97.2 °F (36.2 °C) Infrared 68 16 100 % --   08/05/24 1357 (!) 155/77 97.5 °F (36.4 °C) Infrared 59 18 99 % --   08/05/24 1331 (!) 165/70 97.4 °F (36.3 °C) Axillary 62 18 100 % --       24HR INTAKE/OUTPUT:    Intake/Output Summary (Last 24 hours) at 8/6/2024 0919  Last data filed at 8/6/2024 0539  Gross per 24 hour   Intake 1701 ml   Output 1150 ml   Net 551 ml       CONSTITUTIONAL: cooperative, no apparent distress   ENT: Normocephalic, without obvious abnormality, atraumatic. NG tube in place   NECK: supple, symmetrical, no jugular venous distension   LUNGS: no increased work of breathing   MUSCULOSKELETAL:  tone is normal  NEUROLOGIC: awake to stimuli, alert, oriented to name  SKIN: Normal skin color, texture, turgor and no jaundice. appears intact   EXTREMITIES: no LE edema     DATA:  CBC:    Recent Labs     08/06/24  0550 08/05/24  0547 08/04/24  1051 08/03/24  0736 07/31/24  1624 07/31/24  0550  heterogeneous attenuation of thoracic and cervical vertebral bodies as well as sternum which could reflect metabolic bone disease or chronic metastatic disease    - she underwent GI workup 8/5/24 which revealed obstructing mass on EGD and bleeding mass present on Cscope. The mucosa around these lesion were well preserved suggesting external growth into the colon rather than vice versa. Multiple biopsies obtained. Suspect this is a primary duodenal mass with subsequent growth into the colon based on appearance   - biopsies remain pending      History of colon cancer  -Status post hemicolectomy 3/7/2023 with pathology showing invasive adenocarcinoma, moderately diff, all margins negative for invasive carcinoma, 24 benign lymph nodes   - she was following with Dr. Olivares at [facility], last seen 8/2023  -CT CAP was ordered at that time as well but patient did not complete this. Long standing history of noncompliance with multiple providers     History of Breast carcinoma:  -Diagnosis 9/24/2014  -Right breast  -T1c, N1A, M0, grade 1  -ER positive, MT positive and HER2/edward negative  -Oncotype Dx of 8  -Patient stopped letrozole prematurely due to hot flashes      Wilms tumor  CKD  -Status post surgery at age 8  -Pathology not available  -She states she also had chemotherapy at that time    Anemia  Iron studies more consistent with anemia of chronic disease 7/2024  B12 and Folate ok   Her Hgb is stable     Palliative Care consulted--appreciate recommendations. She has a history of noncompliance. At her last oncology visit, she was unable to state why she was at the appointment and was unaccompanied. She did not follow up as directed.   At this point, she is not a candidate for any oncology directed treatment due to her poor performance status. I spoke with Palliative Care today. We will need to engage family to help with decision making     Jamila Garrido, SARTHAK - CNP  Please contact through Perfect Serve    35 mins total  time was spent on this visit today

## 2024-08-06 NOTE — PROGRESS NOTES
Patient alert and oriented to self. Patient denies  nausea. NG tube in place and functional. Patient complained back pain and managed with PRN dilaudid. Administrating iv abx, fluids and TPN per order. Patient had BM x2. Temp. Elevated and PRN Tylenol supp. Given. Assessment done.see flowsheet. Patient in bed lowest position call light and bedside table within reach. All needs are met at this time. Patient aware to call if any help needed.Plan of care ongoing. BP (!) 163/71   Pulse 69   Temp (!) 100.6 °F (38.1 °C) (Oral)   Resp 18   Ht 1.6 m (5' 2.99\")   Wt 76 kg (167 lb 8.8 oz)   SpO2 99%   BMI 29.69 kg/m²

## 2024-08-06 NOTE — PROGRESS NOTES
Palliative Care Chart Review  and Check in Note:     NAME:  Agustina Fried  Admit Date: 8/2/2024  Hospital Day:  Hospital Day: 5   Current Code status: Full Code    Palliative care is continuing to following Ms. Fried for symptom management,  and goals of care discussion as needed. Patient's chart reviewed today 8/6/24.      Pt is sleeping and no family at the bedside. Nurse reports no family has been by to visit today. Called pt's son Maurice and there was no answer nor ability to LVM. Called sister Rupinder and introduced palliative care. Asked Rupinder to help contact Maurice and set up a family meeting. She'll call back with a time to meet.    5 PM- Received a call back from Rupinder and Maurice, and they requested to have a phone conference tomorrow at 2 PM.    The following are the currently established goals/code status, and Symptom management.     Goals of care: Attempting to discuss.    Code status: Full Code    Discharge plan: CONSTANTIN Ardon NP  Palliative Care  [phone]

## 2024-08-06 NOTE — PROGRESS NOTES
Ph: [phone], Fax: [phone]           [facility]               Reason for admission:                 Pain abdomen nausea and vomiting    Brief Summary :     Agustina Fried is being seen by nephrology for  pain abdomen nausea and vomiting.      Interval History and plan:   Patient seen at bedside  Comfortable  /64  On room air and denied SOB  Urine output 900 ml charted  NG aspirate 250 ml  Cr stable 1.2  K 3.2  Phos 2.4  Mag 2.3        On IVF with KCL  On TPN  Follow BP and PRN Hydralazine.   Replete potassium and phos and monitor lytes.     D/W RN                       Assessment :       CKD Stage IIIa  Creatinine has been variable in the past she has a history of recurrent SHARRON  Last creatinine was 2 upon discharge from 7/2 hospitalization  she has history of diabetes mellitus hypertension      Generalized Edema  She was discharged on 10 mg of torsemide last hospital stay   has diastolic dysfunction      Hypertension   BP: (163-185)/(64-79)  Pulse:  [65-83]   BP goal inpatient 130-140 systolic inpatient          [facility] would like to thank Victor M Almaguer MD   for opportunity to serve this patient      Please call with questions at-   24 Hrs Answering service [phone] or  7 am- 5 pm via Perfect serve or cell phone  Dr.Muhammad MARINA Trimble MD       HPI :     Agustina Fried is a 64 y.o. female presented to   the hospital on 8/2/2024 with  pain abdomen nausea and vomiting.  She is known to have nausea vomiting diarrhea for several days because of which she came to the hospital.  She needed NG tube placement in the emergency room.  Found to have severe gastric distention.  She is known to have CKD and has had multiple hospitalization in the past        PMH/PSH/SH/Family History:     Past Medical History:   Diagnosis Date    Abnormal brain MRI 7/20/2017    Partially empty sella and minimal chronic small vessel ischemic disease    Acute bilateral low back pain without  PRN  dextrose 10 % infusion, , IntraVENous, Continuous PRN    Vitals :     Vitals:    08/06/24 0722   BP: (!) 164/64   Pulse: 65   Resp: 18   Temp: 98 °F (36.7 °C)   SpO2: 97%          Physical Examination :     Appearance: Alert, oriented. NAD.   Respiratory:  No RD on room air.   Cardiovascular: Edema none  Abdomen:  soft  Other relevant findings: NG tube placed.    Labs :     CBC:   Recent Labs     08/04/24  1051 08/05/24  0547 08/06/24  0550   WBC 9.0 9.3 8.4   HGB 10.2* 10.6* 9.0*   HCT 32.1* 32.2* 27.3*    240 194       BMP:    Recent Labs     08/04/24  1051 08/05/24  0547 08/06/24  0550     138 138 136  137   K 2.8*  2.7* 3.7 3.2*  3.1*     108 106 108  108   CO2 21  20* 21 21  21   BUN 10  9 11 14   CREATININE 1.1  1.0 1.1 1.2   GLUCOSE 146*  143* 254* 240*   MG 1.20* 2.70* 2.30   PHOS 2.0*  2.0* 3.5 2.4*       Lab Results   Component Value Date/Time    COLORU Yellow 07/31/2024 04:34 AM    NITRU Negative 07/31/2024 04:34 AM    GLUCOSEU Negative 07/31/2024 04:34 AM    GLUCOSEU >=1000 mg/dL 06/07/2010 03:38 PM    KETUA Negative 07/31/2024 04:34 AM    UROBILINOGEN 0.2 07/31/2024 04:34 AM    BILIRUBINUR Negative 07/31/2024 04:34 AM        ----------------------------------------------------------  Please call with questions at      24 Hrs Answering service [phone]  Perfect serve, or cell phone 7 am - 5pm  Glen Plascencia MD   [facility] [employer] [facility]

## 2024-08-06 NOTE — CARE COORDINATION
Patient still NPO, with NGT and TPN.  Surgery, Heme/Onc/ Pallative following    CM to follow up with discharge needs.

## 2024-08-06 NOTE — PROGRESS NOTES
[facility] -  Clinical Pharmacy Note    Parenteral Nutrition - Management by Pharmacy    Consult Date(s): 8/4/24  Consulting Provider(s): Dr Butler    Assessment / Plan  1)  Gastric outlet obstruction 2/2 ulcerated mass, ileus - Parenteral Nutrition  Day of Therapy: 3  Formulation:  Clinimix E 5/20  Clinimix Rate:  55 mL/hr (Total volume = 1320 mL/day)   Spoke with RD - will keep at 55 mL/hr today to allow further stabilization of electrolytes.  If electrolytes stable tomorrow without signs of refeeding, will advance to goal rate of 70 mL/hr per RD note 8/3/24.  Lipids:  20% 250mL over 12 hours on Mon & Thurs only (due to national shortage) - start today  Appreciate Clinical Dietitian's recommendations for formulation and goal rate.  Electrolytes:  Clinimix-E includes standard electrolyte formulation.  Recommend further repletion with supplemental IVPBs as needed.  Maintenance IVF:  NS with 20 mEq KCl / L @ 50 mL/hr  Glucose control:    Pt has h/o DM.  Takes Lantus 8 units daily + Humalog 4 units TID with meals at home.  Glucoses ranged 219-270 since TPN started last evening.  Used 12 units SSI.  Will increase Regular insulin to 25 units / 24 hrs in TPN bag for this evening.    Will monitor glucoses and adjust insulin in TPN bag as needed - will likely need increase tomorrow.     Add MVI 10 mL/day in PN on Mon-Wed-Fri only (due to national shortage) & Trace Elements 1 mL/day in PN daily.  Daily renal panel, daily magnesium, and weekly triglycerides ordered per protocol.  PN will be re-ordered daily.    Please call with questions--  Thanks--  Valorie Tam, PharmD, BCPS, BCGP  [id number] ([facility])   8/6/2024 10:50 AM      Interval update:  Colonoscopy yesterday - unable to place duodenal stent.  Remains on Meropenem for ESBL E.coli UTI, and remains on Metronidazole IV + Vancomycin ME for C.diff colitis.  Tolerating TPN.    Subjective/Objective:   Agustina Fried is a 64 y.o. female with a PMHx significant  for CAD, HTN, HLD, HFpEF, T2DM, TIA, CKD stage 3, Wilms tumor s/p remote R nephrectomy, anemia, breast cancer, invasive adenocarcinoma s/p R colectomy (3/2023) bipolar dx, depression, anxiety, gout, hx pancreatitis, who is admitted as a transfer from [facility] with gastric outlet obstruction 2/2 large ulcerated mass in duodenum.  PICC placed and TPN started 8/4/24.    Pharmacy is consulted to manage parenteral nutrition.    Ht Readings from Last 1 Encounters:   08/02/24 1.6 m (5' 2.99\")     Wt Readings from Last 1 Encounters:   08/06/24 76 kg (167 lb 8.8 oz)     Recent Labs     08/05/24  0547 08/06/24  0550    136  137   K 3.7 3.2*  3.1*    108  108   CO2 21 21  21   PHOS 3.5 2.4*   GLUCOSE 254* 240*   BUN 11 14   CREATININE 1.1 1.2   CALCIUM 7.4* 7.2*   MG 2.70* 2.30       Albumin   Date Value Ref Range Status   08/06/2024 2.1 (L) 3.4 - 5.0 g/dL Final     Corrected Calcium: 8.7 mg/dL    Recent Labs     08/05/24  1228 08/06/24  0041 08/06/24  0600 08/06/24  0717   POCGLU 188* 270* 219* 234*       Sliding scale insulin used since PN bag hung yesterday: 12 units    Recent Labs     08/05/24  0547 08/06/24  0550   WBC 9.3 8.4   HGB 10.6* 9.0*    194       Triglycerides   Date Value Ref Range Status   08/05/2024 91 0 - 150 mg/dL Final   08/04/2024 70 0 - 150 mg/dL Final   11/27/2023 89 0 - 150 mg/dL Final   09/21/2023 90 0 - 150 mg/dL Final   09/27/2022 249 (H) 0 - 150 mg/dL Final

## 2024-08-06 NOTE — CONSULTS
Comprehensive Nutrition Assessment    RECOMMENDATIONS:  Nutrition Support: PN Clinimix 5/20  Day 2 (8/6): holding at 55 ml/hr.    **Obtain Labs: Daily Phos, Mg, K+. Weekly TG recommended. Monitor lytes in am 8/7 prior to advancing to goal rate.   Day 3 (8/7): Advance PN Clinimix 5/20 to goal rate 70 ml/hr.   Continue lipids bi-weekly  Pharmacy to adjust electrolytes, MVI and Trace Elements as appropriate.  PO Diet: NPO  Nutrition Education: Education not indicated     NUTRITION ASSESSMENT:   Nutritional summary & status: Follow up. Patient remains NPO (sips of clears) and TPN started 8/5. Advanced to day 2 goal today and K+/phos low however being repleted. RD will monitor lytes tomorrow prior to recommending advancement to goal rate. BG elevated, NGT remains in place to LWS. Bowels moving, RD will order CBW. RD will continue to monitor nutritional status.     Admission // PMH: Duodenal mass // hypertension, diabetes, GERD, hyperlipidemia and CKD stage IIIb    MALNUTRITION ASSESSMENT  Context of Malnutrition: Chronic Illness   Malnutrition Status: At risk for malnutrition (significant wt loss)  Findings of the 6 clinical characteristics of malnutrition (Minimum of 2 out of 6 clinical characteristics is required to make the diagnosis of moderate or severe Protein Calorie Malnutrition based on AND/ASPEN Guidelines):  Energy Intake:  Mild decrease in energy intake  (prior to admit, timeframe unknown)  Weight Loss:  Greater than 5% over 1 month (9.5% in 1 month)     Body Fat Loss:  Unable to assess     Muscle Mass Loss:  Unable to assess        NUTRITION DIAGNOSIS   Inadequate oral intake related to altered GI structure as evidenced by weight loss greater than or equal to 5% in 1 month, nutrition support - parenteral nutrition, poor intake prior to admission (9% in 1 month)    Nutrition Monitoring and Evaluation:   Food/Nutrient Intake Outcomes:  Diet Advancement/Tolerance, Parenteral Nutrition  Intake/Tolerance  Physical Signs/Symptoms Outcomes:  Biochemical Data, Nutrition Focused Physical Findings, Skin, Weight, GI Status     OBJECTIVE DATA: Significant to nutrition assessment  Nutrition Related Findings: K+ 3.1, Phos 2.4- both repleting, , +BM 8/6  Wounds: None  Nutrition Goals: Tolerate nutrition support at goal rate     CURRENT NUTRITION THERAPIES  Current Parenteral Nutrition Orders:  Type and Formula: 2-in-1 Standard   Lipids: 250ml, Two times weekly  Duration: Continuous  Goal PN Orders Provides: Clinimix 5/20 @ 70 ml/hr [id number] kcal, 84 g protein, 1680 TV. GIR = 4.0  PO Intake: NPO   PO Supplement Intake:NPO  Additional Sources of Calories/IVF:d5 and NaCl w/ KCl providing 90 kcal     COMPARATIVE STANDARDS  Energy (kcal):  7854-9107 (27-29 kcal/kg CBW)     Protein (g):  75-87 (1.3-1.5 g/kg CBW)       Fluid (ml/day):  1 ml/kcal    ANTHROPOMETRICS  Current Height: 160 cm (5' 2.99\")  Current Weight - Scale: 76 kg (167 lb 8.8 oz)    Admission weight: 58 kg (127 lb 13.9 oz)    The patient will be monitored per nutrition standards of care. Consult dietitian if additional nutrition interventions are needed prior to RD reassessment.     Belinda Riggs RD  [place]:  [phone]

## 2024-08-06 NOTE — PROGRESS NOTES
General Surgery   Daily Progress Note  Patient: Agustina Fried      CC: Duodenal mass    SUBJECTIVE:   NAEON. Patient reports no flatulence, no n/v, no BM, no abdominal pain.  ROS:   A 14 point review of systems was conducted, significant findings as noted above. All other systems negative.    OBJECTIVE:    PHYSICAL EXAM:    Vitals:    08/06/24 0104 08/06/24 0418 08/06/24 0538 08/06/24 0722   BP:  (!) 163/71  (!) 164/64   Pulse:  69  65   Resp: 18 18  18   Temp:  (!) 100.6 °F (38.1 °C)  98 °F (36.7 °C)   TempSrc:  Oral  Oral   SpO2:  99%  97%   Weight:   76 kg (167 lb 8.8 oz)    Height:           General appearance: Alert, no acute distress  Eyes: No scleral icterus, EOM grossly intact  Neck: Trachea midline, no JVD  Chest/Lungs: Normal effort with no accessory muscle use on RA  Cardiovascular: RRR, well perfused  Abdomen: Soft, non-distended, non-tender, no rebound, guarding, or rigidity. Well-healed midline ex lap scar; NGT in place.  Skin: Warm and dry, no rashes  Extremities: No edema, no cyanosis  Neuro: A&Ox3, no focal deficits, sensation intact      ASSESSMENT & PLAN:   This is a 64 y.o. female with Hx of HTN, DM, GERD, hyperlipidemia, CKD stage IIIb and surgical history significant for Wilms tumor s/p right nephrectomy (age 8), breast cancer s/p radiation and lumpectomy (09/2014), invasive adenocarcinoma s/p R colectomy (03/2023) who presented with gastric outlet obstruction symptoms due to large ulcerated mass in the second portion of duodenum.     - Continue to monitor NGT output  - Continue PICC TPN  - Continue Abx  - Further care per primary.       Cindy Owusu MD  PGY1, General Surgery  08/06/24  7:42 AM  [phone]

## 2024-08-07 LAB
ALBUMIN SERPL-MCNC: 2 G/DL (ref 3.4–5)
ALBUMIN SERPL-MCNC: 2 G/DL (ref 3.4–5)
ALP SERPL-CCNC: 138 U/L (ref 40–129)
ALT SERPL-CCNC: 8 U/L (ref 10–40)
ANION GAP SERPL CALCULATED.3IONS-SCNC: 7 MMOL/L (ref 3–16)
ANION GAP SERPL CALCULATED.3IONS-SCNC: 8 MMOL/L (ref 3–16)
ANION GAP SERPL CALCULATED.3IONS-SCNC: 9 MMOL/L (ref 3–16)
AST SERPL-CCNC: 11 U/L (ref 15–37)
BILIRUB DIRECT SERPL-MCNC: <0.2 MG/DL (ref 0–0.3)
BILIRUB INDIRECT SERPL-MCNC: ABNORMAL MG/DL (ref 0–1)
BILIRUB SERPL-MCNC: <0.2 MG/DL (ref 0–1)
BUN SERPL-MCNC: 16 MG/DL (ref 7–20)
BUN SERPL-MCNC: 17 MG/DL (ref 7–20)
CALCIUM SERPL-MCNC: 7.3 MG/DL (ref 8.3–10.6)
CALCIUM SERPL-MCNC: 7.4 MG/DL (ref 8.3–10.6)
CEA SERPL-MCNC: 67.2 NG/ML (ref 0–5)
CHLORIDE SERPL-SCNC: 100 MMOL/L (ref 99–110)
CHLORIDE SERPL-SCNC: 108 MMOL/L (ref 99–110)
CHLORIDE SERPL-SCNC: 98 MMOL/L (ref 99–110)
CO2 SERPL-SCNC: 18 MMOL/L (ref 21–32)
CO2 SERPL-SCNC: 19 MMOL/L (ref 21–32)
CO2 SERPL-SCNC: 21 MMOL/L (ref 21–32)
CREAT SERPL-MCNC: 1.1 MG/DL (ref 0.6–1.2)
CREAT SERPL-MCNC: 1.3 MG/DL (ref 0.6–1.2)
DEPRECATED RDW RBC AUTO: 14.1 % (ref 12.4–15.4)
GFR SERPLBLD CREATININE-BSD FMLA CKD-EPI: 46 ML/MIN/{1.73_M2}
GFR SERPLBLD CREATININE-BSD FMLA CKD-EPI: 56 ML/MIN/{1.73_M2}
GLUCOSE BLD-MCNC: 125 MG/DL (ref 70–99)
GLUCOSE BLD-MCNC: 134 MG/DL (ref 70–99)
GLUCOSE BLD-MCNC: 140 MG/DL (ref 70–99)
GLUCOSE BLD-MCNC: 248 MG/DL (ref 70–99)
GLUCOSE BLD-MCNC: 272 MG/DL (ref 70–99)
GLUCOSE SERPL-MCNC: 114 MG/DL (ref 70–99)
GLUCOSE SERPL-MCNC: 1198 MG/DL (ref 70–99)
HCT VFR BLD AUTO: 26.8 % (ref 36–48)
HGB BLD-MCNC: 8.8 G/DL (ref 12–16)
MAGNESIUM SERPL-MCNC: 1.9 MG/DL (ref 1.8–2.4)
MCH RBC QN AUTO: 29.4 PG (ref 26–34)
MCHC RBC AUTO-ENTMCNC: 33 G/DL (ref 31–36)
MCV RBC AUTO: 89.1 FL (ref 80–100)
PERFORMED ON: ABNORMAL
PHOSPHATE SERPL-MCNC: 2.6 MG/DL (ref 2.5–4.9)
PHOSPHATE SERPL-MCNC: 5.3 MG/DL (ref 2.5–4.9)
PLATELET # BLD AUTO: 164 K/UL (ref 135–450)
PMV BLD AUTO: 9.9 FL (ref 5–10.5)
POTASSIUM SERPL-SCNC: 3.6 MMOL/L (ref 3.5–5.1)
POTASSIUM SERPL-SCNC: 5.3 MMOL/L (ref 3.5–5.1)
POTASSIUM SERPL-SCNC: 6 MMOL/L (ref 3.5–5.1)
PROT SERPL-MCNC: 6 G/DL (ref 6.4–8.2)
RBC # BLD AUTO: 3.01 M/UL (ref 4–5.2)
SODIUM SERPL-SCNC: 124 MMOL/L (ref 136–145)
SODIUM SERPL-SCNC: 128 MMOL/L (ref 136–145)
SODIUM SERPL-SCNC: 136 MMOL/L (ref 136–145)
WBC # BLD AUTO: 7.7 K/UL (ref 4–11)

## 2024-08-07 PROCEDURE — 2580000003 HC RX 258: Performed by: INTERNAL MEDICINE

## 2024-08-07 PROCEDURE — 82378 CARCINOEMBRYONIC ANTIGEN: CPT

## 2024-08-07 PROCEDURE — 80076 HEPATIC FUNCTION PANEL: CPT

## 2024-08-07 PROCEDURE — 87641 MR-STAPH DNA AMP PROBE: CPT

## 2024-08-07 PROCEDURE — 6370000000 HC RX 637 (ALT 250 FOR IP)

## 2024-08-07 PROCEDURE — 83735 ASSAY OF MAGNESIUM: CPT

## 2024-08-07 PROCEDURE — 6370000000 HC RX 637 (ALT 250 FOR IP): Performed by: HOSPITALIST

## 2024-08-07 PROCEDURE — 6370000000 HC RX 637 (ALT 250 FOR IP): Performed by: INTERNAL MEDICINE

## 2024-08-07 PROCEDURE — 6360000002 HC RX W HCPCS: Performed by: INTERNAL MEDICINE

## 2024-08-07 PROCEDURE — 97166 OT EVAL MOD COMPLEX 45 MIN: CPT

## 2024-08-07 PROCEDURE — 2500000003 HC RX 250 WO HCPCS

## 2024-08-07 PROCEDURE — 97530 THERAPEUTIC ACTIVITIES: CPT

## 2024-08-07 PROCEDURE — 97162 PT EVAL MOD COMPLEX 30 MIN: CPT

## 2024-08-07 PROCEDURE — 1200000000 HC SEMI PRIVATE

## 2024-08-07 PROCEDURE — 85027 COMPLETE CBC AUTOMATED: CPT

## 2024-08-07 PROCEDURE — 6360000002 HC RX W HCPCS: Performed by: HOSPITALIST

## 2024-08-07 PROCEDURE — 97535 SELF CARE MNGMENT TRAINING: CPT

## 2024-08-07 PROCEDURE — 2580000003 HC RX 258: Performed by: HOSPITALIST

## 2024-08-07 PROCEDURE — 97116 GAIT TRAINING THERAPY: CPT

## 2024-08-07 PROCEDURE — 80069 RENAL FUNCTION PANEL: CPT

## 2024-08-07 PROCEDURE — 99232 SBSQ HOSP IP/OBS MODERATE 35: CPT | Performed by: SURGERY

## 2024-08-07 PROCEDURE — 80051 ELECTROLYTE PANEL: CPT

## 2024-08-07 RX ORDER — FUROSEMIDE 10 MG/ML
20 INJECTION INTRAMUSCULAR; INTRAVENOUS ONCE
Status: COMPLETED | OUTPATIENT
Start: 2024-08-07 | End: 2024-08-07

## 2024-08-07 RX ADMIN — HYDROMORPHONE HYDROCHLORIDE 0.5 MG: 1 INJECTION, SOLUTION INTRAMUSCULAR; INTRAVENOUS; SUBCUTANEOUS at 23:54

## 2024-08-07 RX ADMIN — SODIUM CHLORIDE, PRESERVATIVE FREE 10 ML: 5 INJECTION INTRAVENOUS at 20:55

## 2024-08-07 RX ADMIN — METRONIDAZOLE 500 MG: 500 INJECTION, SOLUTION INTRAVENOUS at 14:19

## 2024-08-07 RX ADMIN — ASCORBIC ACID, VITAMIN A PALMITATE, CHOLECALCIFEROL, THIAMINE HYDROCHLORIDE, RIBOFLAVIN-5 PHOSPHATE SODIUM, PYRIDOXINE HYDROCHLORIDE, NIACINAMIDE, DEXPANTHENOL, ALPHA-TOCOPHEROL ACETATE, VITAMIN K1, FOLIC ACID, BIOTIN, CYANOCOBALAMIN: 200; 3300; 200; 6; 3.6; 6; 40; 15; 10; 150; 600; 60; 5 INJECTION, SOLUTION INTRAVENOUS at 17:19

## 2024-08-07 RX ADMIN — FUROSEMIDE 20 MG: 10 INJECTION, SOLUTION INTRAMUSCULAR; INTRAVENOUS at 15:43

## 2024-08-07 RX ADMIN — HYDROMORPHONE HYDROCHLORIDE 0.5 MG: 1 INJECTION, SOLUTION INTRAMUSCULAR; INTRAVENOUS; SUBCUTANEOUS at 09:52

## 2024-08-07 RX ADMIN — HYDROMORPHONE HYDROCHLORIDE 0.5 MG: 1 INJECTION, SOLUTION INTRAMUSCULAR; INTRAVENOUS; SUBCUTANEOUS at 18:02

## 2024-08-07 RX ADMIN — METRONIDAZOLE 500 MG: 500 INJECTION, SOLUTION INTRAVENOUS at 22:38

## 2024-08-07 RX ADMIN — SODIUM CHLORIDE, PRESERVATIVE FREE 10 ML: 5 INJECTION INTRAVENOUS at 20:56

## 2024-08-07 RX ADMIN — HYDROMORPHONE HYDROCHLORIDE 0.5 MG: 1 INJECTION, SOLUTION INTRAMUSCULAR; INTRAVENOUS; SUBCUTANEOUS at 00:19

## 2024-08-07 RX ADMIN — VANCOMYCIN HYDROCHLORIDE 500 MG: 1 INJECTION, POWDER, LYOPHILIZED, FOR SOLUTION INTRAVENOUS at 04:15

## 2024-08-07 RX ADMIN — SODIUM CHLORIDE: 9 INJECTION, SOLUTION INTRAVENOUS at 22:37

## 2024-08-07 RX ADMIN — VANCOMYCIN HYDROCHLORIDE 500 MG: 1 INJECTION, POWDER, LYOPHILIZED, FOR SOLUTION INTRAVENOUS at 15:43

## 2024-08-07 RX ADMIN — SODIUM CHLORIDE, PRESERVATIVE FREE 10 ML: 5 INJECTION INTRAVENOUS at 08:30

## 2024-08-07 RX ADMIN — THIAMINE HYDROCHLORIDE 100 MG: 100 INJECTION, SOLUTION INTRAMUSCULAR; INTRAVENOUS at 08:29

## 2024-08-07 RX ADMIN — INSULIN GLARGINE 10 UNITS: 100 INJECTION, SOLUTION SUBCUTANEOUS at 20:54

## 2024-08-07 RX ADMIN — MEROPENEM 1000 MG: 1 INJECTION INTRAVENOUS at 04:20

## 2024-08-07 RX ADMIN — VANCOMYCIN HYDROCHLORIDE 500 MG: 1 INJECTION, POWDER, LYOPHILIZED, FOR SOLUTION INTRAVENOUS at 22:41

## 2024-08-07 RX ADMIN — INSULIN LISPRO 8 UNITS: 100 INJECTION, SOLUTION INTRAVENOUS; SUBCUTANEOUS at 17:56

## 2024-08-07 RX ADMIN — MEROPENEM 1000 MG: 1 INJECTION INTRAVENOUS at 15:43

## 2024-08-07 RX ADMIN — SODIUM CHLORIDE, PRESERVATIVE FREE 40 MG: 5 INJECTION INTRAVENOUS at 08:29

## 2024-08-07 RX ADMIN — POTASSIUM CHLORIDE AND SODIUM CHLORIDE: 900; 150 INJECTION, SOLUTION INTRAVENOUS at 05:55

## 2024-08-07 RX ADMIN — SODIUM CHLORIDE, PRESERVATIVE FREE 40 MG: 5 INJECTION INTRAVENOUS at 22:30

## 2024-08-07 RX ADMIN — METRONIDAZOLE 500 MG: 500 INJECTION, SOLUTION INTRAVENOUS at 06:05

## 2024-08-07 RX ADMIN — VANCOMYCIN HYDROCHLORIDE 500 MG: 1 INJECTION, POWDER, LYOPHILIZED, FOR SOLUTION INTRAVENOUS at 08:29

## 2024-08-07 RX ADMIN — SODIUM CHLORIDE, PRESERVATIVE FREE 10 ML: 5 INJECTION INTRAVENOUS at 08:29

## 2024-08-07 ASSESSMENT — PAIN DESCRIPTION - LOCATION
LOCATION: BACK

## 2024-08-07 ASSESSMENT — PAIN DESCRIPTION - DESCRIPTORS
DESCRIPTORS: ACHING

## 2024-08-07 ASSESSMENT — PAIN - FUNCTIONAL ASSESSMENT
PAIN_FUNCTIONAL_ASSESSMENT: ACTIVITIES ARE NOT PREVENTED
PAIN_FUNCTIONAL_ASSESSMENT: PREVENTS OR INTERFERES SOME ACTIVE ACTIVITIES AND ADLS
PAIN_FUNCTIONAL_ASSESSMENT: ACTIVITIES ARE NOT PREVENTED
PAIN_FUNCTIONAL_ASSESSMENT: ACTIVITIES ARE NOT PREVENTED

## 2024-08-07 ASSESSMENT — PAIN DESCRIPTION - ORIENTATION
ORIENTATION: LOWER
ORIENTATION: ANTERIOR
ORIENTATION: ANTERIOR
ORIENTATION: POSTERIOR

## 2024-08-07 ASSESSMENT — PAIN DESCRIPTION - FREQUENCY
FREQUENCY: INTERMITTENT
FREQUENCY: INTERMITTENT

## 2024-08-07 ASSESSMENT — PAIN SCALES - WONG BAKER
WONGBAKER_NUMERICALRESPONSE: NO HURT
WONGBAKER_NUMERICALRESPONSE: HURTS A LITTLE BIT

## 2024-08-07 ASSESSMENT — PAIN SCALES - GENERAL
PAINLEVEL_OUTOF10: 7
PAINLEVEL_OUTOF10: 9
PAINLEVEL_OUTOF10: 9
PAINLEVEL_OUTOF10: 0
PAINLEVEL_OUTOF10: 8

## 2024-08-07 ASSESSMENT — PAIN DESCRIPTION - PAIN TYPE
TYPE: CHRONIC PAIN
TYPE: CHRONIC PAIN

## 2024-08-07 ASSESSMENT — PAIN DESCRIPTION - ONSET
ONSET: GRADUAL
ONSET: GRADUAL

## 2024-08-07 NOTE — PROGRESS NOTES
Palliative Medicine Progress Note    Admit Date: 8/2/2024  Hospital Day:  Hospital Day: 6     CC: diarrhea   HPI: Agustina Fried is a 64 y.o. female with PMH of  HFpEF, CAD, HTN, HLD, T2DM, CKD3b, normocytic anemia, right breast cancer in 9/2014, Wilms tumor s/p right nephrectomy 1968, and anxiety  who presented with nausea, emesis and diarrhea for three days prior to presentation from her nursing facility. CT on admission showed moderate ileus with severe gastric distention. NTG placed on 7/30. EGD on 8/1 showed large ulcerated mass in second portion of duodenum with large amount of retained food in stomach. Transferred to [facility] on 8/2 for surgical evaluation.     Spoke with pt's sister Rupinder. She reported pt was living in a third floor apartment by herself prior to going to [facility]. I brought up that pt seems to be forgetful, was missing appointments and seems to lack insight into her condition. Rupinder agreed that pt seems forgetful at times and has not been fully transparent regarding her condition. Rupinder does not drive and her son Maurice's car is broken down so she has not been able to see her to see what is really going on. Plan on family phone conference at 2pm today. Rupinder reported that multiple family members are/or have been dealing with cancer. They have lost multiple siblings to cancer in the past several years (only 6 out of 11 still living).     Spoke with pt's sister Rupinder and son Maurice over the phone along with SARTHAK Marino with oncology. Provided thorough update regarding pt's medical condition. They reported that pt would want to pursue aggressive management of her suspected cancer. Explained that pt's performance status currently precludes chemotherapy, and if she were to improve enough to be a candidate for chemotherapy she would need significant support from family for care coordination given her cognitive deficits. They agreed and still want to continue with aggressive  status 30      Objective:     Patient Vitals for the past 8 hrs:   BP Temp Temp src Pulse Resp SpO2 Weight   08/07/24 0826 (!) 166/73 98.1 °F (36.7 °C) Oral 74 17 99 % --   08/07/24 0459 -- -- -- -- -- -- 77.9 kg (171 lb 11.8 oz)   08/07/24 0352 (!) 167/80 97.9 °F (36.6 °C) Oral 64 18 99 % --     I/O last 3 completed shifts:  In: 2930 [P.O.:240; I.V.:1012; NG/GT:180; IV Piggyback:310]  Out: 2050 [Urine:900; Emesis/NG output:1150]  I/O this shift:  In: -   Out: 500 [Urine:500]    Physical Exam  Constitutional:       General: She is not in acute distress.     Appearance: She is ill-appearing.   Cardiovascular:      Rate and Rhythm: Normal rate and regular rhythm.      Heart sounds: Normal heart sounds.   Pulmonary:      Breath sounds: Normal breath sounds.   Abdominal:      General: Bowel sounds are normal.      Palpations: Abdomen is soft.   Musculoskeletal:      Right lower leg: No edema.      Left lower leg: No edema.   Skin:     General: Skin is warm and dry.   Neurological:      Mental Status: She is disoriented.          WBC/Hgb/Hct/Plts:  7.7/8.8/26.8/164 (08/07 0445)           Assessment:     Principal Problem:    Duodenal mass  Active Problems:    Duodenal cancer (HCC)  Resolved Problems:    * No resolved hospital problems. *      Time spent with patient and/or family: 45  Time reviewing records: 5  Time communicating with providers: 10    A total of 60 minutes spent with the patient and family on unit greater than 50% face to face time in counseling regarding palliative care and goals of care for the patient.     Sophia DE LEÓN  Inpatient Palliative Care  [phone]

## 2024-08-07 NOTE — PROGRESS NOTES
Patient alert and oriented x3. Patient complained chronic back pain and managed well with PRN dilaudid. NG tube in place and kept to suction overnight. Assessment done.see flowsheet. Patient in bed lowest position call light and bedside table within reach. All needs are met at this time. Patient aware to call if any help needed.Plan of care ongoing. BP (!) 167/80   Pulse 64   Temp 97.9 °F (36.6 °C) (Oral)   Resp 18   Ht 1.6 m (5' 2.99\")   Wt 77.9 kg (171 lb 11.8 oz)   SpO2 99%   BMI 30.43 kg/m²

## 2024-08-07 NOTE — PROGRESS NOTES
Ph: [phone], Fax: [phone]           [facility]               Reason for admission:                 Pain abdomen nausea and vomiting    Brief Summary :     Agustina Fried is being seen by nephrology for  pain abdomen nausea and vomiting.      Interval History and plan:   Patient seen at bedside  Comfortable  BP  elevated 166/73  On room air and denied SOB  Urine output ?   NG aspirate 900 ml  Cr 1.2  > 1.3  K 3.6  Phos 2.6  Mag 1.9  Wt going up.           On IVF with KCL. Consider stopping IVF as patient is on TPN  Replete lytes as needed  On Clonidine patch for hypertension. Follow BP  Check urine protein Cr ratio and albuminuria.     Will discuss with primary team.                        Assessment :       CKD Stage IIIa  Creatinine has been variable in the past she has a history of recurrent SHARRON  Last creatinine was 2 upon discharge from 7/2 hospitalization  she has history of diabetes mellitus hypertension    CHF  Echo (7/2/24)    Limited only for LVEF and RVF.    Image quality is adequate.    Left Ventricle: Normal left ventricular systolic function with a visually estimated EF of 55 - 60%. Normal wall motion.    Right ventricle size is normal. Normal systolic function.    Does not appear volume overloaded      Hypertension   BP: (167)/(80)  Pulse:  [64]   BP goal inpatient 130-140 systolic inpatient        [facility] would like to thank Victor M Almaguer MD   for opportunity to serve this patient      Please call with questions at-   24 Hrs Answering service [phone] or  7 am- 5 pm via Perfect serve or cell phone  Dr.Muhammad MARINA Trimble MD       HPI :     Agustina Fried is a 64 y.o. female presented to   the hospital on 8/2/2024 with  pain abdomen nausea and vomiting.  She is known to have nausea vomiting diarrhea for several days because of which she came to the hospital.  She needed NG tube placement in the emergency room.  Found to have severe gastric distention.  She  TPN  insulin glargine (LANTUS) injection vial 10 Units, 10 Units, SubCUTAneous, Nightly  phenol 1.4 % mouth spray 1 spray, 1 spray, Mouth/Throat, Q2H PRN  metroNIDAZOLE (FLAGYL) 500 mg in 0.9% NaCl 100 mL IVPB premix, 500 mg, IntraVENous, Q8H  cloNIDine (CATAPRES) 0.2 MG/24HR 1 patch, 1 patch, TransDERmal, Weekly  insulin lispro (HUMALOG,ADMELOG) injection vial 0-16 Units, 0-16 Units, SubCUTAneous, 4 times per day  pantoprazole (PROTONIX) 40 mg in sodium chloride (PF) 0.9 % 10 mL injection, 40 mg, IntraVENous, Q12H  sodium chloride flush 0.9 % injection 5-40 mL, 5-40 mL, IntraVENous, 2 times per day  sodium chloride flush 0.9 % injection 5-40 mL, 5-40 mL, IntraVENous, PRN  0.9 % sodium chloride infusion, , IntraVENous, PRN  0.9% NaCl with KCl 20 mEq infusion, , IntraVENous, Continuous  [Held by provider] enoxaparin (LOVENOX) injection 40 mg, 40 mg, SubCUTAneous, Daily  thiamine (B-1) injection 100 mg, 100 mg, IntraVENous, Daily  diatrizoate meglumine-sodium (GASTROGRAFIN) 66-10 % solution 30 mL, 30 mL, Rectal, ONCE PRN  diatrizoate meglumine-sodium (GASTROGRAFIN) 66-10 % solution 30 mL, 30 mL, Per NG tube, ONCE PRN  HYDROmorphone (DILAUDID) injection 0.25 mg, 0.25 mg, IntraVENous, Q3H PRN **OR** HYDROmorphone (DILAUDID) injection 0.5 mg, 0.5 mg, IntraVENous, Q3H PRN  vancomycin 500 mg in sodium chloride 0.9% 100 mL enema, 500 mg, Rectal, Q6H  meropenem (MERREM) 1,000 mg in sodium chloride 0.9 % 100 mL IVPB (mini-bag), 1,000 mg, IntraVENous, Q12H  sodium chloride flush 0.9 % injection 5-40 mL, 5-40 mL, IntraVENous, 2 times per day  sodium chloride flush 0.9 % injection 5-40 mL, 5-40 mL, IntraVENous, PRN  0.9 % sodium chloride infusion, , IntraVENous, PRN  ondansetron (ZOFRAN-ODT) disintegrating tablet 4 mg, 4 mg, Oral, Q8H PRN **OR** ondansetron (ZOFRAN) injection 4 mg, 4 mg, IntraVENous, Q6H PRN  polyethylene glycol (GLYCOLAX) packet 17 g, 17 g, Oral, Daily PRN  acetaminophen (TYLENOL) tablet 650 mg, 650 mg, Oral,  Q6H PRN **OR** acetaminophen (TYLENOL) suppository 650 mg, 650 mg, Rectal, Q6H PRN  glucose chewable tablet 16 g, 4 tablet, Oral, PRN  dextrose bolus 10% 125 mL, 125 mL, IntraVENous, PRN **OR** dextrose bolus 10% 250 mL, 250 mL, IntraVENous, PRN  glucagon injection 1 mg, 1 mg, SubCUTAneous, PRN  dextrose 10 % infusion, , IntraVENous, Continuous PRN    Vitals :     Vitals:    08/07/24 0352   BP: (!) 167/80   Pulse: 64   Resp: 18   Temp: 97.9 °F (36.6 °C)   SpO2: 99%          Physical Examination :     Appearance: Alert, oriented. NAD.   Respiratory:  No RD on room air.   Cardiovascular: Edema none  Abdomen:  soft  Other relevant findings: NG tube placed.    Labs :     CBC:   Recent Labs     08/05/24  0547 08/06/24  0550 08/07/24  0445   WBC 9.3 8.4 7.7   HGB 10.6* 9.0* 8.8*   HCT 32.2* 27.3* 26.8*    194 164       BMP:    Recent Labs     08/05/24  0547 08/06/24  0550 08/07/24  0445    136  137 136   K 3.7 3.2*  3.1* 3.6    108  108 108   CO2 21 21  21 21   BUN 11 14 17   CREATININE 1.1 1.2 1.3*   GLUCOSE 254* 240* 114*   MG 2.70* 2.30 1.90   PHOS 3.5 2.4* 2.6       Lab Results   Component Value Date/Time    COLORU Yellow 07/31/2024 04:34 AM    NITRU Negative 07/31/2024 04:34 AM    GLUCOSEU Negative 07/31/2024 04:34 AM    GLUCOSEU >=1000 mg/dL 06/07/2010 03:38 PM    KETUA Negative 07/31/2024 04:34 AM    UROBILINOGEN 0.2 07/31/2024 04:34 AM    BILIRUBINUR Negative 07/31/2024 04:34 AM        ----------------------------------------------------------  Please call with questions at      24 Hrs Answering service [phone]  Perfect serve, or cell phone 7 am - 5pm  Glen Plascencia MD   [email or web address]

## 2024-08-07 NOTE — PROGRESS NOTES
Physical Therapy  Facility/Department: [address]  Physical Therapy Initial Assessment/treatment note    Name: Agustina Fried  : 1960  MRN: [id number]  Date of Service: 2024    Discharge Recommendations:  Subacute/Skilled Nursing Facility   PT Equipment Recommendations  Equipment Needed:  (RW if home)      Patient Diagnosis(es): Diagnoses of Small bowel obstruction (HCC) and Rectal bleeding were pertinent to this visit.  Past Medical History:  has a past medical history of Abnormal brain MRI, Acute bilateral low back pain without sciatica, SHARRON (acute kidney injury) (HCC), Arthritis, Bipolar disorder (HCC), CAD (coronary artery disease), Cancer (HCC), Carotid stenosis, bilateral:<50%:per [place] 2016, Carpal tunnel syndrome, Cervical cancer screening, Coronary artery disease of native artery of native heart with stable angina pectoris (HCC), DDD (degenerative disc disease), lumbar, Depression, Depression/anxiety, Depression/anxiety, Diabetes mellitus (HCC), Gout, History of mammogram, History of therapeutic radiation, Hyperlipidemia, Hypertension, 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  Past Surgical History:  has a past surgical history that includes Kidney removal; Hysterectomy; Breast lumpectomy (); Tubal ligation; other surgical history (Right); Cardiac catheterization (2020); Upper gastrointestinal endoscopy (N/A, 2021); Colonoscopy (N/A, 2021); CT BIOPSY BONE MARROW (2/3/2021); Temporal Artery Biopsy (Right, 2021); Upper gastrointestinal endoscopy (N/A, 12/15/2022); Colonoscopy (N/A, 2023); hemicolectomy (N/A,  Toilet: Standard  Bathroom Equipment: Tub transfer bench  Has the patient had two or more falls in the past year or any fall with injury in the past year?: Yes (a couple falls, Pt reported none since previous admission in july)  ADL Assistance: Independent  Homemaking Responsibilities: No  Ambulation Assistance: Independent  Transfer Assistance: Independent  Active : No  Patient's  Info: uses a cab to get to appointments and a friend to go to the grocery store  Occupation: Retired  Additional Comments: Pt from home but was at SNF since last admission at beginning of month. Pt reports at rehab was mostly IND with ADLs    Vision/Hearing  Vision  Vision: Impaired  Vision Exceptions: Wears glasses for reading  Hearing  Hearing: Within functional limits      Cognition   Orientation  Overall Orientation Status: Within Normal Limits  Orientation Level: Oriented X4  Cognition  Overall Cognitive Status: WFL  Cognition Comment: flat affect     Objective                 AROM RLE (degrees)  RLE AROM: WFL  AROM LLE (degrees)  LLE AROM : WFL    Strength RLE  Strength RLE: WFL  Strength LLE  Strength LLE: WFL        Bed mobility  Supine to Sit: Stand by assistance (HOB up)  Scooting: Stand by assistance (to EOB)    Transfers  Sit to Stand: Contact guard assistance (x3 trials with vc for hand placement)  Stand to Sit: Contact guard assistance (x3 trials with vc for hand placement)    Ambulation  Device: Rolling Walker  Assistance: Contact guard assistance  Quality of Gait: slow mauro with decreased step length; no overt LOB noted  Distance: 10 ft, 25 ft  Stairs/Curb  Stairs?:  (unable to assess due to contact plus precautions)             AM-PAC - Mobility    AM-PAC Basic Mobility - Inpatient   How much help is needed turning from your back to your side while in a flat bed without using bedrails?: A Little  How much help is needed moving from lying on your back to sitting on the side of a flat bed without using

## 2024-08-07 NOTE — CARE COORDINATION
Patient is from [facility] and is able to return there but would need precert for placement. They cannot accept while patient is on TPN.     Palliative care has a family meeting today at 1400.     Electronically signed by Elaina Rutherford RN on 8/7/2024 at 12:16 PM  [phone]

## 2024-08-07 NOTE — PROGRESS NOTES
V2.0    [facility]      Name:  Agustina Fried /Age/Sex: 1960  (64 y.o. female)   MRN & CSN:  [id number] & [id number] Encounter Date/Time: 2024 11:32 AM EDT   Location:  5319/5319-01 PCP: Viridiana Blanco APRN - NP     Attending:Victor M Almaguer MD       Hospital Day: 6    Assessment and Recommendations   Agustina Fried is a 64 y.o. 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      It does not look like she followed after her EGD results and she did not follow with oncology since . She was also recommended to have an EUS for evaluation of elevated alk phos and double ductal sign on imaging, but she has failed to follow-up numerous times      Pt was admitted - with acute CHF exacerbation and ESBL Ecoli UTI treated with meropenem. She presented to the ED  with  nausea, vomiting, diarrhea , abd pain and rectal bleeding. CT showed ileus with severe gastric distension. She was seen by general surgery and had NG. She was admitted to [facility]. Hb dropped from 8.1 to 6.9. She was seen by GI who did an EGD that showed duodenal malignancy resulting in obstruction . CT chest showed Patchy ground-glass opacity within the left lower lobe with superimposed  micronodularity; correlate for pneumonitis.  Metastatic disease cannot be entirely excluded. Chronic heterogeneous attenuation of thoracic and cervical vertebral bodies, as well as sternum, which could reflect metabolic bone disease or chronic metastatic disease.     Pt and family decided on  suggestions       and were generated based on input data.  These codes are preliminary and upon        review may be revised to meet current compliance and payer requirements.       The provider is responsible for the final determination of appropriate codes,       and modifiers. SUSSY BRIZUELA MD This document has been electronically signed ELVA. Note Initiated:7/31/2024 Note Completed:7/31/2024 2:38 PM    XR ABDOMEN (KUB) (SINGLE AP VIEW)    Result Date: 7/30/2024  EXAMINATION: ONE SUPINE XRAY VIEW(S) OF THE ABDOMEN 7/30/2024 12:41 pm COMPARISON: CT on the same date HISTORY: ORDERING SYSTEM PROVIDED HISTORY: ng placement TECHNOLOGIST PROVIDED HISTORY: Reason for exam:->ng placement Reason for Exam: ng FINDINGS: Interval placement of enteric tube.  Side hole is confirmed in the lumen of the stomach with tip extending below field of view of this abdominal radiograph.  No pneumoperitoneum.  Unremarkable bowel gas pattern.  Heart and mediastinum are normal.  The lungs appear clear.  No skeletal finding.     Enteric tube is positioned in the stomach.  Distal tip is below field of view of this exam.     CT ABDOMEN PELVIS WO CONTRAST Additional Contrast? None    Result Date: 7/30/2024  EXAMINATION: CT OF THE ABDOMEN AND PELVIS WITHOUT CONTRAST 7/30/2024 11:06 am TECHNIQUE: CT of the abdomen and pelvis was performed without the administration of intravenous contrast. Multiplanar reformatted images are provided for review. Automated exposure control, iterative reconstruction, and/or weight based adjustment of the mA/kV was utilized to reduce the radiation dose to as low as reasonably achievable. COMPARISON: 04/19/2024 HISTORY: ORDERING SYSTEM PROVIDED HISTORY: abd pain TECHNOLOGIST PROVIDED HISTORY: Reason for exam:->abd pain Additional Contrast?->None Decision Support Exception - unselect if not a suspected or confirmed emergency medical condition->Emergency Medical Condition (MA) Reason for Exam: bleeding from  <0.2 <0.2 <0.2   ALKPHOS 164* 154* 138*       Lipids:   Lab Results   Component Value Date/Time    CHOL 154 11/27/2023 07:00 PM    HDL 75 11/27/2023 07:00 PM    TRIG 91 08/05/2024 05:47 AM     Hemoglobin A1C:   Lab Results   Component Value Date/Time    LABA1C 5.4 07/30/2024 10:03 AM     TSH:   Lab Results   Component Value Date/Time    TSH 0.86 12/11/2022 04:45 AM     Troponin: No results found for: \"TROPONINT\"  Lactic Acid: No results for input(s): \"LACTA\" in the last 72 hours.  BNP: No results for input(s): \"PROBNP\" in the last 72 hours.  UA:  Lab Results   Component Value Date/Time    NITRU Negative 07/31/2024 04:34 AM    COLORU Yellow 07/31/2024 04:34 AM    PHUR 6.0 07/31/2024 04:34 AM    PHUR 5.5 04/18/2024 04:40 PM    WBCUA 21-50 07/31/2024 04:34 AM    RBCUA 5-10 07/31/2024 04:34 AM    YEAST Present 12/10/2023 02:53 PM    BACTERIA 4+ 07/31/2024 04:34 AM    CLARITYU SL CLOUDY 07/31/2024 04:34 AM    SPECGRAV 1.010 01/06/2013 12:11 AM    LEUKOCYTESUR TRACE 07/31/2024 04:34 AM    UROBILINOGEN 0.2 07/31/2024 04:34 AM    BILIRUBINUR Negative 07/31/2024 04:34 AM    BLOODU MODERATE 07/31/2024 04:34 AM    GLUCOSEU Negative 07/31/2024 04:34 AM    GLUCOSEU >=1000 mg/dL 06/07/2010 03:38 PM    KETUA Negative 07/31/2024 04:34 AM    AMORPHOUS 3+ 04/18/2024 04:40 PM     Urine Cultures:   Lab Results   Component Value Date/Time    LABURIN  07/31/2024 04:34 AM     >100,000 CFU/ml  CONTACT PRECAUTIONS INDICATED  Carbapenem antibiotics are the best option for infections caused  by ESBL producing organisms.  Other antibiotic classes are  likely to result in treatment failure, even for organisms  demonstrating in vitro susceptibility.       Blood Cultures:   Lab Results   Component Value Date/Time    BC No Growth after 4 days of incubation. 04/17/2024 06:19 AM     Lab Results   Component Value Date/Time    BLOODCULT2 No Growth after 4 days of incubation. 04/17/2024 06:19 AM     Organism:   Lab Results   Component Value Date/Time

## 2024-08-07 NOTE — PROGRESS NOTES
Occupational Therapy  Facility/Department: [facility]  Occupational Therapy Initial Assessment/Treatment    Name: Agustina Fried  : 1960  MRN: [id number]  Date of Service: 2024    Discharge Recommendations:  Subacute/Skilled Nursing Facility  OT Equipment Recommendations  Equipment Needed: No (defer to next level of care)       Patient Diagnosis(es): Diagnoses of Small bowel obstruction (HCC) and Rectal bleeding were pertinent to this visit.  Past Medical History:  has a past medical history of Abnormal brain MRI, Acute bilateral low back pain without sciatica, SHARRON (acute kidney injury) (HCC), Arthritis, Bipolar disorder (HCC), CAD (coronary artery disease), Cancer (HCC), Carotid stenosis, bilateral:<50%:per [place] 2016, Carpal tunnel syndrome, Cervical cancer screening, Coronary artery disease of native artery of native heart with stable angina pectoris (HCC), DDD (degenerative disc disease), lumbar, Depression, Depression/anxiety, Depression/anxiety, Diabetes mellitus (HCC), Gout, History of mammogram, History of therapeutic radiation, Hyperlipidemia, Hypertension, 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  Past Surgical History:  has a past surgical history that includes Kidney removal; Hysterectomy; Breast lumpectomy (); Tubal ligation; other surgical history (Right); Cardiac catheterization (2020); Upper gastrointestinal endoscopy (N/A, 2021); Colonoscopy (N/A, 2021); CT BIOPSY BONE MARROW (2/3/2021); Temporal Artery Biopsy (Right, 2021); Upper gastrointestinal endoscopy (N/A, 12/15/2022); Colonoscopy (N/A, 2023);  in july)  ADL Assistance: Independent  Homemaking Responsibilities: No  Ambulation Assistance: Independent  Transfer Assistance: Independent  Active : No  Patient's  Info: uses a cab to get to appointments and a friend to go to the grocery store  Occupation: Retired  Additional Comments: Pt from home but was at SNF since last admission at beginning of month. Pt reports at rehab was mostly IND with ADLs       Objective   Temp: 98.1 °F (36.7 °C)  Pulse: 74  Heart Rate Source: Monitor  Respirations: 17  SpO2: 99 %  O2 Device: None (Room air)  BP: (!) 166/73  MAP (Calculated): 104  BP Location: Left upper arm  BP Method: Automatic  Patient Position: Semi fowlers             Safety Devices  Type of Devices: Left in chair;Call light within reach;Chair alarm in place;Nurse notified  Balance  Sitting: High guard (spvn= static; SBA= dynamic)  Standing: With support (SBA= static; CGA= dynamic)  Gait  Gait Training: Yes  Overall Level of Assistance: Contact-guard assistance (Pt demo functional mobility to/from bathroom using RW w/ CGA. pt w/ slow, steady gait)  Assistive Device: Gait belt;Walker, rolling  Toilet Transfers  Toilet - Technique: Ambulating (w/ RW)  Equipment Used: Standard toilet  Toilet Transfer: Contact guard assistance  AROM: Within functional limits  PROM: Within functional limits  Strength: Generally decreased, functional  Coordination: Generally decreased, functional  ADL  Feeding: Setup  Feeding Skilled Clinical Factors: Pt eating breakfast upon arrival and required assist to open all containers for clear liquid diet  Grooming: Stand by assistance  Grooming Skilled Clinical Factors: in stance at sink to complete hand hygiene w/ SBA for standing balance  LE Dressing: Contact guard assistance  LE Dressing Skilled Clinical Factors: pt threaded BLE into brief while seated on toilet s/ SBA for dynamic sitting balance. Pt completed clothing mgmt at hips in stance w/ CGA  Toileting: Contact guard  complete toileting w/ spvn  Short Term Goal 2: Pt will complete functional transfers w/ spvn  Short Term Goal 3: Pt will complete LE dressing w/ spvn       Therapy Time   Individual Concurrent Group Co-treatment   Time In 0925         Time Out 1003         Minutes 38         Timed Code Treatment Minutes: 23 Minutes     Total Treatment Minutes: 38    Lurdes dockery, OT

## 2024-08-07 NOTE — PROGRESS NOTES
ONCOLOGY HEMATOLOGY CARE PROGRESS NOTE      SUBJECTIVE:    Therapy stating patient is below functional baseline. She will need rehab following hospitalization. No BM. Remains on TPN. [facility] cannot accept her with TPN.       OBJECTIVE        Physical    VITALS:  Patient Vitals for the past 24 hrs:   BP Temp Temp src Pulse Resp SpO2 Weight   08/07/24 1537 (!) 147/73 97.8 °F (36.6 °C) Axillary 78 16 100 % --   08/07/24 1227 (!) 165/70 97.7 °F (36.5 °C) Axillary 59 15 100 % --   08/07/24 0826 (!) 166/73 98.1 °F (36.7 °C) Oral 74 17 99 % --   08/07/24 0459 -- -- -- -- -- -- 77.9 kg (171 lb 11.8 oz)   08/07/24 0352 (!) 167/80 97.9 °F (36.6 °C) Oral 64 18 99 % --   08/07/24 0019 -- -- -- -- 18 -- --   08/06/24 2325 (!) 169/79 98.3 °F (36.8 °C) Oral 71 18 100 % --   08/06/24 1946 (!) 147/71 98.3 °F (36.8 °C) Oral 69 18 95 % --       24HR INTAKE/OUTPUT:    Intake/Output Summary (Last 24 hours) at 8/7/2024 1556  Last data filed at 8/7/2024 1235  Gross per 24 hour   Intake 1969 ml   Output 1600 ml   Net 369 ml       DATA:  CBC:    Recent Labs     08/07/24  0445 08/06/24  0550 08/05/24  0547 08/04/24  1051 08/03/24  0736 07/31/24  1624 07/31/24  0550 07/30/24  1003   WBC 7.7 8.4 9.3 9.0 9.7   < > 8.6 10.8   NEUTROABS  --   --   --  7.1 7.7  --  6.3 8.4*   LYMPHOPCT  --   --   --  14.1 12.2  --  21.2 17.4   RBC 3.01* 3.07* 3.60* 3.53* 3.29*   < > 2.31* 2.76*   HGB 8.8* 9.0* 10.6* 10.2* 9.6*   < > 6.9* 8.1*   HCT 26.8* 27.3* 32.2* 32.1* 30.0*   < > 21.3* 25.3*   MCV 89.1 89.0 89.5 91.2 91.1   < > 92.3 91.5   MCH 29.4 29.3 29.5 29.0 29.1   < > 29.8 29.3   MCHC 33.0 32.9 32.9 31.9 32.0   < > 32.3 32.0   RDW 14.1 14.6 14.5 14.9 14.7   < > 14.9 15.1    194 240 209 248   < > 251 297    < > = values in this interval not displayed.       PT/INR:    Recent Labs     08/04/24  1051   INR 1.09     PTT:  No results for input(s): \"APTT\" in the last 720 hours.    CMP:    Recent Labs      before we can discuss a treatment plan. Discussed concerns that her performance status is poor and she will need rehab following her hospitalization. I doubt she is in a position to go home. Despite this, the patient's family wants to pursue treatment and aggressive care. We explained that she would need to be able to follow up in the office and have a caregiver present with her during appointments. Unsure how realistic this is given her past history of noncompliance. We will revisit the discussion once we have a diagnosis     History of colon cancer  -Status post hemicolectomy 3/7/2023 with pathology showing invasive adenocarcinoma, moderately diff, all margins negative for invasive carcinoma, 24 benign lymph nodes   - she was following with Dr. Olivares at [facility], last seen 8/2023  -CT CAP was ordered at that time as well but patient did not complete this. Long standing history of noncompliance with multiple providers   CEA elevated at 50.5     History of Breast carcinoma:  -Diagnosis 9/24/2014  -Right breast  -T1c, N1A, M0, grade 1  -ER positive, MI positive and HER2/edward negative  -Oncotype Dx of 8  -Patient stopped letrozole prematurely due to hot flashes      Wilms tumor  CKD  -Status post surgery at age 8  -Pathology not available  -She states she also had chemotherapy at that time    Anemia  Iron studies more consistent with anemia of chronic disease 7/2024  B12 and Folate ok   Her Hgb is stable         SARTHAK Jaramillo - CNP  Please contact through [employer]

## 2024-08-07 NOTE — PROGRESS NOTES
V2.0    [employer] [facility] [employer] Progress Note      Name:  Agustina Fried /Age/Sex: 1960  (64 y.o. female)   MRN & CSN:  [id number] & [id number] Encounter Date/Time: 2024 11:32 AM EDT   Location:  5319/5319-01 PCP: Viridiana Blanco APRN - NP     Attending:Victor M Almaguer MD       Hospital Day: 6    Assessment and Recommendations   Agustina Fried is a 64 y.o. 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      It does not look like she followed after her EGD results and she did not follow with oncology since . She was also recommended to have an EUS for evaluation of elevated alk phos and double ductal sign on imaging, but she has failed to follow-up numerous times      Pt was admitted - with acute CHF exacerbation and ESBL Ecoli UTI treated with meropenem. She presented to the ED  with  nausea, vomiting, diarrhea , abd pain and rectal bleeding. CT showed ileus with severe gastric distension. She was seen by general surgery and had NG. She was admitted to [facility]. Hb dropped from 8.1 to 6.9. She was seen by GI who did an EGD that showed duodenal malignancy resulting in obstruction . CT chest showed Patchy ground-glass opacity within the left lower lobe with superimposed  micronodularity; correlate for pneumonitis.  Metastatic disease cannot be entirely excluded. Chronic heterogeneous attenuation of thoracic and cervical vertebral bodies, as well as sternum, which could reflect metabolic bone disease or chronic metastatic disease.     Pt and family decided on  syndrome   Hypokalemia   Hypomagnesemia  Hypophosphatemia  -All electrolytes dropped after D5 suggestive possible refeeding syndrome  -correct electrolytes and monitor closely . Cont thiamine for now  -I suspect last night's labs are a lab error. Pending morning labs      Elevated trop - demand ischemia. Monitor      Elevated ALP - previous MRCP showed .mild intra and extrahepatic biliary ductal dilatation with abrupt tapering of the distal common duct, and pancreatic duct dilatation. EUS was recommended but she never followed up      Acute on chronic anemia - given a unit of blood at [facility]. Hb stable since then     ESBL UTI - last day of meropenem today (7 days)    HTN- PO meds on hold. Cont clonidine patch      Type II DM with uncontrolled hyperglycemia and hypoglycemia  -Last A1c: 5.4  -Home regimen: glargine 8 units, and SSI  -Hospital regimen: Cont SSI . Lantus 10 units.  Discussed with pharmacy and insulan regular is at 25 units with the TPN  -Monitor for hypoglycemia due to ISS with serial Accu-checks   -Hypoglycemia protocol     Chronic pancreatitis - per imaging   Obesity  Hx of CVA  Depression/Anxiety/ Bipolar disorder  DMII  CKDIII  HFrEF with recent admission for exacerbation  CAD  Chronic pain   Carotid stenosis  Hx of Cdiff infection  Hx of renal cell carcinoma/Wilms tumor   Hx of breast cancer  Hx of colon cancer diagnosed by colonoscopy s/p R colectomy (03/23)   -Oral meds on hold.     Medical Decision Making:  The following items were considered in medical decision making:  Discussion of patient care with other providers  Reviewed clinical lab tests if any  Reviewed radiology tests if any  Reviewed other diagnostic tests/interventions  Independent review of radiologic images if any  Microbiology cultures and other micro tests if any        Subjective:     Covering her face with the sheet. Stated she is ok. Did not want to talk much       Review of Systems:      Pertinent positives and negatives  08/06/24  0550 08/07/24 0445   WBC 9.3 8.4 7.7   HGB 10.6* 9.0* 8.8*    194 164       BMP:    Recent Labs     08/05/24  0547 08/06/24  0550 08/07/24 0445    136  137 136   K 3.7 3.2*  3.1* 3.6    108  108 108   CO2 21 21  21 21   BUN 11 14 17   CREATININE 1.1 1.2 1.3*   GLUCOSE 254* 240* 114*       Hepatic:   Recent Labs     08/05/24  0547 08/06/24  0550 08/07/24 0445   AST 14* 12* 11*   ALT 8* 9* 8*   BILITOT <0.2 <0.2 <0.2   ALKPHOS 164* 154* 138*       Lipids:   Lab Results   Component Value Date/Time    CHOL 154 11/27/2023 07:00 PM    HDL 75 11/27/2023 07:00 PM    TRIG 91 08/05/2024 05:47 AM     Hemoglobin A1C:   Lab Results   Component Value Date/Time    LABA1C 5.4 07/30/2024 10:03 AM     TSH:   Lab Results   Component Value Date/Time    TSH 0.86 12/11/2022 04:45 AM     Troponin: No results found for: \"TROPONINT\"  Lactic Acid: No results for input(s): \"LACTA\" in the last 72 hours.  BNP: No results for input(s): \"PROBNP\" in the last 72 hours.  UA:  Lab Results   Component Value Date/Time    NITRU Negative 07/31/2024 04:34 AM    COLORU Yellow 07/31/2024 04:34 AM    PHUR 6.0 07/31/2024 04:34 AM    PHUR 5.5 04/18/2024 04:40 PM    WBCUA 21-50 07/31/2024 04:34 AM    RBCUA 5-10 07/31/2024 04:34 AM    YEAST Present 12/10/2023 02:53 PM    BACTERIA 4+ 07/31/2024 04:34 AM    CLARITYU SL CLOUDY 07/31/2024 04:34 AM    SPECGRAV 1.010 01/06/2013 12:11 AM    LEUKOCYTESUR TRACE 07/31/2024 04:34 AM    UROBILINOGEN 0.2 07/31/2024 04:34 AM    BILIRUBINUR Negative 07/31/2024 04:34 AM    BLOODU MODERATE 07/31/2024 04:34 AM    GLUCOSEU Negative 07/31/2024 04:34 AM    GLUCOSEU >=1000 mg/dL 06/07/2010 03:38 PM    KETUA Negative 07/31/2024 04:34 AM    AMORPHOUS 3+ 04/18/2024 04:40 PM     Urine Cultures:   Lab Results   Component Value Date/Time    LABURIN  07/31/2024 04:34 AM     >100,000 CFU/ml  CONTACT PRECAUTIONS INDICATED  Carbapenem antibiotics are the best option for infections caused  by ESBL producing

## 2024-08-07 NOTE — PROGRESS NOTES
[facility] -  Clinical Pharmacy Note    Parenteral Nutrition - Management by Pharmacy    Consult Date(s): 8/4/24  Consulting Provider(s): Dr Butler    Assessment / Plan  1)  Gastric outlet obstruction 2/2 ulcerated mass, ileus - Parenteral Nutrition  Day of Therapy: 4  Formulation:  Clinimix E 5/20  Clinimix Rate:  55 mL/hr (Total volume = 1320 mL/day)   Spoke with RD - will keep at 55 mL/hr today to allow further stabilization of electrolytes.  If electrolytes stable tomorrow without signs of refeeding, will advance to goal rate of 70 mL/hr per RD note 8/3/24.  Lipids:  20% 250mL over 12 hours on Mon & Thurs only (due to national shortage)  Appreciate Clinical Dietitian's recommendations for formulation and goal rate.  Electrolytes:  Clinimix-E includes standard electrolyte formulation.  Recommend further repletion with supplemental IVPBs as needed.  Maintenance IVF:  n/a  Glucose control:    Pt has h/o DM.  Takes Lantus 8 units daily + Humalog 4 units TID with meals at home.  Glucoses ranged 114-140 since TPN started last evening.  Used 0 units SSI.  Much improved since increasing insulin in TPN last evening.  Will continue Regular insulin 25 units / 24 hrs in TPN bag for this evening.    Will monitor glucoses and adjust insulin in TPN bag as needed.     Add MVI 10 mL/day in PN on Mon-Wed-Fri only (due to national shortage) & Trace Elements 1 mL/day in PN daily.  Daily renal panel, daily magnesium, and weekly triglycerides ordered per protocol.  PN will be re-ordered daily.    Please call with questions--  Thanks--  Valorie Tam, PharmD, BCPS, BCGP  [id number] ([facility])   8/7/2024 12:41 PM      Interval update:   Clamping NG during the day; return to CLWS overnight.  Sips of clears started.  Remains on TPN.    Subjective/Objective:   Agustina Fried is a 64 y.o. female with a PMHx significant for CAD, HTN, HLD, HFpEF, T2DM, TIA, CKD stage 3, Wilms tumor s/p remote R nephrectomy, anemia, breast cancer,  invasive adenocarcinoma s/p R colectomy (3/2023) bipolar dx, depression, anxiety, gout, hx pancreatitis, who is admitted as a transfer from [facility] with gastric outlet obstruction 2/2 large ulcerated mass in duodenum.  PICC placed and TPN started 8/4/24.    Pharmacy is consulted to manage parenteral nutrition.    Ht Readings from Last 1 Encounters:   08/02/24 1.6 m (5' 2.99\")     Wt Readings from Last 1 Encounters:   08/07/24 77.9 kg (171 lb 11.8 oz)     Recent Labs     08/06/24  0550 08/07/24  0445     137 136   K 3.2*  3.1* 3.6     108 108   CO2 21  21 21   PHOS 2.4* 2.6   GLUCOSE 240* 114*   BUN 14 17   CREATININE 1.2 1.3*   CALCIUM 7.2* 7.3*   MG 2.30 1.90       Albumin   Date Value Ref Range Status   08/07/2024 2.0 (L) 3.4 - 5.0 g/dL Final     Corrected Calcium: 8.9 mg/dL    Recent Labs     08/06/24  1912 08/07/24  0015 08/07/24  0532 08/07/24  1231   POCGLU 307* 134* 125* 140*       Sliding scale insulin used since PN bag hung yesterday: 0 units    Recent Labs     08/06/24  0550 08/07/24  0445   WBC 8.4 7.7   HGB 9.0* 8.8*    164       Triglycerides   Date Value Ref Range Status   08/05/2024 91 0 - 150 mg/dL Final   08/04/2024 70 0 - 150 mg/dL Final   11/27/2023 89 0 - 150 mg/dL Final   09/21/2023 90 0 - 150 mg/dL Final   09/27/2022 249 (H) 0 - 150 mg/dL Final

## 2024-08-07 NOTE — PLAN OF CARE
Problem: Pain  Goal: Verbalizes/displays adequate comfort level or baseline comfort level    Pain controled with analgesics with benefit for the patient.      Problem: Skin/Tissue Integrity  Goal: Absence of new skin breakdown    Note: Incontinent care performed, patient self turned in the bed. No new skin issues noted.     Problem: Nutrition Deficit:  Goal: Optimize nutritional status  Note: Patient tolerated clear diet well, clamped for 2 hrs and unclamped for 1 hour since 1000 today per order. Total 200 ml of bile green NGT outcome for the day shift.      Problem: Chronic Conditions and Co-morbidities  Goal: Patient's chronic conditions and co-morbidity symptoms are monitored and maintained or improved  Note: BG controlled, insulin administered per order.      Problem: Safety - Adult  Goal: Free from fall injury  Note: Bed in lowest position. Bed brakes/alarm is on. Call light within reach. All patient's needs met at this time.

## 2024-08-07 NOTE — PROGRESS NOTES
General Surgery   Daily Progress Note  Patient: Agustina Fried      CC: Duodenal mass    SUBJECTIVE:   Denies pain. Continues with flatus but no BM. Tolerating sips of clears.     ROS:   A 14 point review of systems was conducted, significant findings as noted above. All other systems negative.    OBJECTIVE:    PHYSICAL EXAM:    Vitals:    08/07/24 0019 08/07/24 0352 08/07/24 0459 08/07/24 0826   BP:  (!) 167/80  (!) 166/73   Pulse:  64  74   Resp: 18 18  17   Temp:  97.9 °F (36.6 °C)  98.1 °F (36.7 °C)   TempSrc:  Oral  Oral   SpO2:  99%  99%   Weight:   77.9 kg (171 lb 11.8 oz)    Height:           General appearance: Alert, no acute distress  Chest/Lungs: Normal effort with no accessory muscle use on RA  Cardiovascular: well perfused  Abdomen: Soft, non-distended, non-tender, no rebound, guarding, or rigidity. Well-healed midline ex lap scar; NGT in place.  Skin: Warm and dry, no rashes  Extremities: No edema, no cyanosis  Neuro: Alert, sensation intact      ASSESSMENT & PLAN:   This is a 64 y.o. female with Hx of HTN, DM, GERD, hyperlipidemia, CKD stage IIIb and surgical history significant for Wilms tumor s/p right nephrectomy (age 8), breast cancer s/p radiation and lumpectomy (09/2014), invasive adenocarcinoma s/p R colectomy (03/2023) who presented with gastric outlet obstruction symptoms due to large ulcerated mass in the second portion of duodenum.     -NG to be clamped during the day and returned to suction this evening  -OK for sips of soda/supplements  - Continue to monitor NGT output  - Continue PICC TPN  - Continue Abx  - Remainder of care per primary.     Cecily Rojas, PIETER  General Surgery  08/07/24  10:54 AM

## 2024-08-07 NOTE — PROGRESS NOTES
VSS. C/o pain this am gave prn with positive pain control. TPN continues through PICC. Intermittent IV abx. NG tube clamped with sips of soda and supplements. Sba to restroom voiding yellow urine in a toilet and 1 small BM. Vanc enemas continue q 6 hours. Call light is within reach, fall precautions in place.     Electronically signed by Janae Jensen RN on 8/7/2024 at 5:39 PM

## 2024-08-08 LAB
ALBUMIN SERPL-MCNC: 1.8 G/DL (ref 3.4–5)
ALBUMIN SERPL-MCNC: 1.9 G/DL (ref 3.4–5)
ANION GAP SERPL CALCULATED.3IONS-SCNC: 6 MMOL/L (ref 3–16)
ANION GAP SERPL CALCULATED.3IONS-SCNC: 9 MMOL/L (ref 3–16)
BUN SERPL-MCNC: 17 MG/DL (ref 7–20)
BUN SERPL-MCNC: 17 MG/DL (ref 7–20)
CALCIUM SERPL-MCNC: 7.2 MG/DL (ref 8.3–10.6)
CALCIUM SERPL-MCNC: 7.3 MG/DL (ref 8.3–10.6)
CHLORIDE SERPL-SCNC: 107 MMOL/L (ref 99–110)
CHLORIDE SERPL-SCNC: 109 MMOL/L (ref 99–110)
CO2 SERPL-SCNC: 21 MMOL/L (ref 21–32)
CO2 SERPL-SCNC: 22 MMOL/L (ref 21–32)
CREAT SERPL-MCNC: 1.1 MG/DL (ref 0.6–1.2)
CREAT SERPL-MCNC: 1.1 MG/DL (ref 0.6–1.2)
DEPRECATED RDW RBC AUTO: 14.2 % (ref 12.4–15.4)
GFR SERPLBLD CREATININE-BSD FMLA CKD-EPI: 56 ML/MIN/{1.73_M2}
GFR SERPLBLD CREATININE-BSD FMLA CKD-EPI: 56 ML/MIN/{1.73_M2}
GLUCOSE BLD-MCNC: 110 MG/DL (ref 70–99)
GLUCOSE BLD-MCNC: 118 MG/DL (ref 70–99)
GLUCOSE BLD-MCNC: 125 MG/DL (ref 70–99)
GLUCOSE BLD-MCNC: 133 MG/DL (ref 70–99)
GLUCOSE BLD-MCNC: 224 MG/DL (ref 70–99)
GLUCOSE BLD-MCNC: 269 MG/DL (ref 70–99)
GLUCOSE SERPL-MCNC: 136 MG/DL (ref 70–99)
GLUCOSE SERPL-MCNC: 88 MG/DL (ref 70–99)
HCT VFR BLD AUTO: 26.7 % (ref 36–48)
HGB BLD-MCNC: 9 G/DL (ref 12–16)
MAGNESIUM SERPL-MCNC: 1.8 MG/DL (ref 1.8–2.4)
MCH RBC QN AUTO: 29.8 PG (ref 26–34)
MCHC RBC AUTO-ENTMCNC: 33.8 G/DL (ref 31–36)
MCV RBC AUTO: 88.2 FL (ref 80–100)
MRSA DNA SPEC QL NAA+PROBE: NORMAL
PERFORMED ON: ABNORMAL
PHOSPHATE SERPL-MCNC: 2.4 MG/DL (ref 2.5–4.9)
PHOSPHATE SERPL-MCNC: 2.5 MG/DL (ref 2.5–4.9)
PHOSPHATE SERPL-MCNC: 2.6 MG/DL (ref 2.5–4.9)
PLATELET # BLD AUTO: 153 K/UL (ref 135–450)
PMV BLD AUTO: 9.9 FL (ref 5–10.5)
POTASSIUM SERPL-SCNC: 3.8 MMOL/L (ref 3.5–5.1)
POTASSIUM SERPL-SCNC: 3.8 MMOL/L (ref 3.5–5.1)
RBC # BLD AUTO: 3.03 M/UL (ref 4–5.2)
SODIUM SERPL-SCNC: 137 MMOL/L (ref 136–145)
SODIUM SERPL-SCNC: 137 MMOL/L (ref 136–145)
TRIGL SERPL-MCNC: 88 MG/DL (ref 0–150)
WBC # BLD AUTO: 8.6 K/UL (ref 4–11)

## 2024-08-08 PROCEDURE — 2500000003 HC RX 250 WO HCPCS: Performed by: HOSPITALIST

## 2024-08-08 PROCEDURE — 6370000000 HC RX 637 (ALT 250 FOR IP): Performed by: HOSPITALIST

## 2024-08-08 PROCEDURE — 6360000002 HC RX W HCPCS: Performed by: INTERNAL MEDICINE

## 2024-08-08 PROCEDURE — 2580000003 HC RX 258: Performed by: HOSPITALIST

## 2024-08-08 PROCEDURE — 2500000003 HC RX 250 WO HCPCS

## 2024-08-08 PROCEDURE — 97110 THERAPEUTIC EXERCISES: CPT

## 2024-08-08 PROCEDURE — 2580000003 HC RX 258: Performed by: INTERNAL MEDICINE

## 2024-08-08 PROCEDURE — 97530 THERAPEUTIC ACTIVITIES: CPT

## 2024-08-08 PROCEDURE — 1200000000 HC SEMI PRIVATE

## 2024-08-08 PROCEDURE — 99233 SBSQ HOSP IP/OBS HIGH 50: CPT | Performed by: SURGERY

## 2024-08-08 PROCEDURE — 6370000000 HC RX 637 (ALT 250 FOR IP)

## 2024-08-08 PROCEDURE — 80069 RENAL FUNCTION PANEL: CPT

## 2024-08-08 PROCEDURE — 84100 ASSAY OF PHOSPHORUS: CPT

## 2024-08-08 PROCEDURE — 6360000002 HC RX W HCPCS: Performed by: HOSPITALIST

## 2024-08-08 PROCEDURE — 83735 ASSAY OF MAGNESIUM: CPT

## 2024-08-08 PROCEDURE — 85027 COMPLETE CBC AUTOMATED: CPT

## 2024-08-08 RX ADMIN — I.V. FAT EMULSION 250 ML: 20 EMULSION INTRAVENOUS at 17:45

## 2024-08-08 RX ADMIN — MEROPENEM 1000 MG: 1 INJECTION INTRAVENOUS at 03:02

## 2024-08-08 RX ADMIN — Medication 1 MG: at 09:34

## 2024-08-08 RX ADMIN — VANCOMYCIN HYDROCHLORIDE 500 MG: 1 INJECTION, POWDER, LYOPHILIZED, FOR SOLUTION INTRAVENOUS at 23:02

## 2024-08-08 RX ADMIN — INSULIN GLARGINE 10 UNITS: 100 INJECTION, SOLUTION SUBCUTANEOUS at 20:21

## 2024-08-08 RX ADMIN — METRONIDAZOLE 500 MG: 500 INJECTION, SOLUTION INTRAVENOUS at 23:00

## 2024-08-08 RX ADMIN — VANCOMYCIN HYDROCHLORIDE 500 MG: 1 INJECTION, POWDER, LYOPHILIZED, FOR SOLUTION INTRAVENOUS at 16:38

## 2024-08-08 RX ADMIN — HYDROMORPHONE HYDROCHLORIDE 0.5 MG: 1 INJECTION, SOLUTION INTRAMUSCULAR; INTRAVENOUS; SUBCUTANEOUS at 20:30

## 2024-08-08 RX ADMIN — VANCOMYCIN HYDROCHLORIDE 500 MG: 1 INJECTION, POWDER, LYOPHILIZED, FOR SOLUTION INTRAVENOUS at 09:21

## 2024-08-08 RX ADMIN — MEROPENEM 1000 MG: 1 INJECTION INTRAVENOUS at 16:38

## 2024-08-08 RX ADMIN — SODIUM CHLORIDE, PRESERVATIVE FREE 40 MG: 5 INJECTION INTRAVENOUS at 09:30

## 2024-08-08 RX ADMIN — METRONIDAZOLE 500 MG: 500 INJECTION, SOLUTION INTRAVENOUS at 15:05

## 2024-08-08 RX ADMIN — SODIUM CHLORIDE, PRESERVATIVE FREE 40 MG: 5 INJECTION INTRAVENOUS at 20:22

## 2024-08-08 RX ADMIN — THIAMINE HYDROCHLORIDE 100 MG: 100 INJECTION, SOLUTION INTRAMUSCULAR; INTRAVENOUS at 09:33

## 2024-08-08 RX ADMIN — METRONIDAZOLE 500 MG: 500 INJECTION, SOLUTION INTRAVENOUS at 06:44

## 2024-08-08 RX ADMIN — SODIUM PHOSPHATE, MONOBASIC, MONOHYDRATE AND SODIUM PHOSPHATE, DIBASIC, ANHYDROUS 10 MMOL: 142; 276 INJECTION, SOLUTION INTRAVENOUS at 16:37

## 2024-08-08 RX ADMIN — VANCOMYCIN HYDROCHLORIDE 500 MG: 1 INJECTION, POWDER, LYOPHILIZED, FOR SOLUTION INTRAVENOUS at 03:02

## 2024-08-08 RX ADMIN — TRACE ELEMENTS INJECTION 4: 7.4; .75; 98; 151 INJECTION, SOLUTION INTRAVENOUS at 17:42

## 2024-08-08 RX ADMIN — HYDROMORPHONE HYDROCHLORIDE 0.5 MG: 1 INJECTION, SOLUTION INTRAMUSCULAR; INTRAVENOUS; SUBCUTANEOUS at 15:04

## 2024-08-08 ASSESSMENT — PAIN SCALES - GENERAL
PAINLEVEL_OUTOF10: 3
PAINLEVEL_OUTOF10: 8
PAINLEVEL_OUTOF10: 3
PAINLEVEL_OUTOF10: 8

## 2024-08-08 NOTE — PROGRESS NOTES
Pt Ax0x4. VSS with the exception of elevated Bp (MD aware no new orders). Pt ambulated in room and in halls. Pt worked with pt/ot. Pt educated on IS. Pain controlled with prn meds. Pt had 1 BM. Pt given vanc enemax2. Ngt clamped

## 2024-08-08 NOTE — PROGRESS NOTES
Patient's PICC did not give blood return this morning. Purple lumen also is not able to be flushed. Provider made aware.     Electronically signed by RENU MULLEN RN on 8/8/2024 at 7:00 AM

## 2024-08-08 NOTE — PROGRESS NOTES
General Surgery   Daily Progress Note  Patient: Agustina Fried      CC: Duodenal mass    SUBJECTIVE:   No acute events overnight.     ROS:   A 14 point review of systems was conducted, significant findings as noted above. All other systems negative.    OBJECTIVE:    PHYSICAL EXAM:    Vitals:    08/07/24 2354 08/08/24 0024 08/08/24 0228 08/08/24 0600   BP:   (!) 170/68    Pulse:   64    Resp: 16 16     Temp:   98.4 °F (36.9 °C)    TempSrc:   Oral    SpO2:   100%    Weight:    77.8 kg (171 lb 8.3 oz)   Height:           General appearance: Alert, no acute distress  Chest/Lungs: Normal effort with no accessory muscle use on RA  Cardiovascular: well perfused  Abdomen: Soft, non-distended, non-tender, no rebound, guarding, or rigidity. Well-healed midline ex lap scar; NGT in place.  Skin: Warm and dry, no rashes  Extremities: No edema, no cyanosis  Neuro: Alert, sensation intact      ASSESSMENT & PLAN:   This is a 64 y.o. female with Hx of HTN, DM, GERD, hyperlipidemia, CKD stage IIIb and surgical history significant for Wilms tumor s/p right nephrectomy (age 8), breast cancer s/p radiation and lumpectomy (09/2014), invasive adenocarcinoma s/p R colectomy (03/2023) who presented with gastric outlet obstruction symptoms due to large ulcerated mass in the second portion of duodenum.     - Continue NGT and monitoring NGT output  - Continue PICC TPN  - Will reach out to son for consent  - Pre-op, NPO at midnight  - Remainder of care per primary.     Cindy Owusu MD  PGY1, General Surgery  08/08/24  8:00 AM  [phone]

## 2024-08-08 NOTE — PROGRESS NOTES
[facility] -  Clinical Pharmacy Note    Parenteral Nutrition - Management by Pharmacy    Consult Date(s): 8/4/24  Consulting Provider(s): Dr Butler    Assessment / Plan  1)  Gastric outlet obstruction 2/2 ulcerated mass, ileus - Parenteral Nutrition  Day of Therapy: 5  Formulation:  Clinimix E 5/20  Clinimix Rate:  70 mL/hr (Total volume = 1680 mL/day) - advanced to goal today per RD recs  Lipids:  20% 250mL over 12 hours on Mon & Thurs only (due to national shortage)  Appreciate Clinical Dietitian's recommendations for formulation and goal rate.  Electrolytes:  Clinimix-E includes standard electrolyte formulation.  Recommend further repletion with supplemental IVPBs as needed.  Maintenance IVF:  n/a  Glucose control:    Pt has h/o DM.  Takes Lantus 8 units daily + Humalog 4 units TID with meals at home.  Glucoses ranged 118-248 since TPN started last evening.  Used 8 units SSI, and got Lantus 10 units qhs (ordered by hospitalist).  Will increase Regular insulin administered to 30 units / 24 hrs in TPN bag for this evening since rate is being advanced to goal.    Will monitor glucoses and adjust insulin in TPN bag as needed.     Add MVI 10 mL/day in PN on Mon-Wed-Fri only (due to national shortage) & Trace Elements 1 mL/day in PN daily.  Daily renal panel, daily magnesium, and weekly triglycerides ordered per protocol.  PN will be re-ordered daily.    Please call with questions--  Thanks--  Valorie Tam, PharmD, BCPS, BCGP  [phone] ([facility])   8/8/2024 11:28 AM      Interval update:  NG remains in place and pt continues on TPN.  Per notes, NPO for OR tomorrow.    Subjective/Objective:   Agustina Fried is a 64 y.o. female with a PMHx significant for CAD, HTN, HLD, HFpEF, T2DM, TIA, CKD stage 3, Wilms tumor s/p remote R nephrectomy, anemia, breast cancer, invasive adenocarcinoma s/p R colectomy (3/2023) bipolar dx, depression, anxiety, gout, hx pancreatitis, who is admitted as a transfer from [facility] with  gastric outlet obstruction 2/2 large ulcerated mass in duodenum.  PICC placed and TPN started 8/4/24.    Pharmacy is consulted to manage parenteral nutrition.    Ht Readings from Last 1 Encounters:   08/02/24 1.6 m (5' 2.99\")     Wt Readings from Last 1 Encounters:   08/08/24 77.8 kg (171 lb 8.3 oz)     Recent Labs     08/07/24  0445 08/07/24  1720 08/08/24  0555 08/08/24  1040      < > 137 137   K 3.6   < > 3.8 3.8      < > 107 109   CO2 21   < > 21 22   PHOS 2.6   < > 2.6  2.5 2.4*   GLUCOSE 114*   < > 136* 88   BUN 17   < > 17 17   CREATININE 1.3*   < > 1.1 1.1   CALCIUM 7.3*   < > 7.2* 7.3*   MG 1.90  --  1.80  --     < > = values in this interval not displayed.       Albumin   Date Value Ref Range Status   08/08/2024 1.9 (L) 3.4 - 5.0 g/dL Final     Corrected Calcium: 9 mg/dL    Recent Labs     08/07/24  1731 08/07/24  1900 08/07/24  2359 08/08/24  0641   POCGLU 272* 248* 133* 118*       Sliding scale insulin used since PN bag hung yesterday: 8 units + Lantus 10 units qHS    Recent Labs     08/07/24  0445 08/08/24  0606   WBC 7.7 8.6   HGB 8.8* 9.0*    153       Triglycerides   Date Value Ref Range Status   08/06/2024 88 0 - 150 mg/dL Final   08/05/2024 91 0 - 150 mg/dL Final   08/04/2024 70 0 - 150 mg/dL Final   11/27/2023 89 0 - 150 mg/dL Final   09/21/2023 90 0 - 150 mg/dL Final

## 2024-08-08 NOTE — PROGRESS NOTES
Surgery progress note     Visited patient earlier this afternoon. Patient reports she has been walking to bathroom but not in the hallways. Asked patient why she has not walked in the halls and she asked why she would need to. Encouraged patient to walk in the halls to maintain her strength if she wants to proceed with surgical intervention, and she said she does want surgery and hesitantly agreed to walk in halls.    I called her son this afternoon to discuss plan of surgery. We discussed that given the location of her tumor, we cannot simply excise this and her best surgical option is to try to establish enteral access so that she is not reliant on TPN. We discussed she is higher risk than average given her prior surgical history and malnutrition. The son, Maurice, asked what else we can do to make her safer for surgery. We discussed that we would be continuing TPN for nutrition and also told him about our conversation with the patient about ambulation. He expressed understanding and wanted to proceed with surgery that is scheduled for monday.     Carla Wagner MD  PGY4, General Surgery  08/08/24  12:28 PM  [facility]  [phone]

## 2024-08-08 NOTE — CARE COORDINATION
Patient admitted from [facility] and can return as long as they have a bed and she is approved by insurance for SNF.     Patient is currently NPO with NG to LCWS and TPN running. Plan for OR on 8/12 for peg tube placement.     Electronically signed by Elaina Rutherford RN on 8/8/2024 at 2:57 PM  [phone]

## 2024-08-08 NOTE — PROGRESS NOTES
Occupational Therapy  Facility/Department: [address]  Daily Treatment    Name: Agustina Fried  : 1960  MRN: [id number]  Date of Service: 2024    Discharge Recommendations:  Subacute/Skilled Nursing Facility  OT Equipment Recommendations  Equipment Needed: No  Other: defer     Patient Diagnosis(es): Diagnoses of Small bowel obstruction (HCC) and Rectal bleeding were pertinent to this visit.  Past Medical History:  has a past medical history of Abnormal brain MRI, Acute bilateral low back pain without sciatica, SHARRON (acute kidney injury) (HCC), Arthritis, Bipolar disorder (HCC), CAD (coronary artery disease), Cancer (HCC), Carotid stenosis, bilateral:<50%:per US 2016, Carpal tunnel syndrome, Cervical cancer screening, Coronary artery disease of native artery of native heart with stable angina pectoris (HCC), DDD (degenerative disc disease), lumbar, Depression, Depression/anxiety, Depression/anxiety, Diabetes mellitus (HCC), Gout, History of mammogram, History of therapeutic radiation, Hyperlipidemia, Hypertension, 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  Past Surgical History:  has a past surgical history that includes Kidney removal; Hysterectomy; Breast lumpectomy (); Tubal ligation; other surgical history (Right); Cardiac catheterization (2020); Upper gastrointestinal endoscopy (N/A, 2021); Colonoscopy (N/A, 2021); CT BIOPSY BONE MARROW (2/3/2021); Temporal Artery Biopsy (Right, 2021); Upper gastrointestinal endoscopy (N/A, 12/15/2022); Colonoscopy (N/A, 2023); hemicolectomy (N/A, 3/7/2023); Upper gastrointestinal  Comments: Pt from home but was at SNF since last admission at beginning of month. Pt reports at rehab was mostly IND with ADLs       Objective            Safety Devices  Type of Devices: Left in chair;Call light within reach;Chair alarm in place;Nurse notified;Gait belt             ADL  LE Dressing: Supervision (to adjust B socks seated EOB)  Skin Care: Bath wipes;Soap and water;Chlorhexidine wipes;Incontinent cleanser          Bed mobility  Supine to Sit: Supervision (HOB at 35 degrees, to R)  Scooting: Stand by assistance (to EOB)    Transfers  Stand Step Transfers: Contact guard assistance (with RW bed to adjacent chair)  Sit to stand: Contact guard assistance  Stand to sit: Contact guard assistance  Transfer Comments: VCs for safe t/f technique and safe use of RW       Cognition  Overall Cognitive Status: Exceptions  Safety Judgement: Decreased awareness of need for safety  Problem Solving: Decreased awareness of errors;Able to problem solve independently;Assistance required to generate solutions;Assistance required to implement solutions;Assistance required to identify errors made  Cognition Comment: impulsive  Orientation  Overall Orientation Status: Impaired (off on date by 2 days, reoriented)  Orientation Level: Oriented to person;Oriented to place;Oriented to situation                                      Therapeutic exercise/activity:  Pt completed 2 sets of the following to build strength and functional activity tolerance for ADL, IADL, and t/fs as well as improve balance reactions. CGA to SBA with light UE balance support on RW prn:  10 consecutive sit to stands from recliner  10 calf raises  10 knee raises each leg    Therapeutic Exercise  Pt completed 1 set, 10-15 reps of B ankle, knee, hip, digit, elbow, and shoulder flex/ext, chest press, and shoulder ab/dduction while seated after education. Pt educated to complete at least 3x/day, 10 reps increasing resistance and repetitions as able. Pt stated and  demonstrated understanding.    Functional mobility: Pt walked bed to chair with RW, gait belt, and SBA.    Education (verbal/demo): Role of OT, safe t/f training, safe use of DME, awareness of deficits, discharge planning, ADL as therapeutic exercise, importance of OOB, fall prevention strategies, therex, cog orientation Pt verbalized/demo understanding.                AM-PAC - ADL  AM-PAC Daily Activity - Inpatient   How much help is needed for putting on and taking off regular lower body clothing?: A Little  How much help is needed for bathing (which includes washing, rinsing, drying)?: A Lot  How much help is needed for toileting (which includes using toilet, bedpan, or urinal)?: A Little  How much help is needed for putting on and taking off regular upper body clothing?: A Little  How much help is needed for taking care of personal grooming?: A Little  How much help for eating meals?: A Little  AM-[facility] Inpatient Daily Activity Raw Score: 17  AM-PAC Inpatient ADL T-Scale Score : 37.26  ADL Inpatient CMS 0-100% Score: 50.11  ADL Inpatient CMS G-Code Modifier : CK    Goals  Short Term Goals  Time Frame for Short Term Goals: by dc  Short Term Goal 1: Pt will complete toileting w/ spvn  Short Term Goal 2: Pt will complete functional transfers w/ spvn  Short Term Goal 3: Pt will complete LE dressing w/ spvn  Patient Goals   Patient goals : \"Get my strength back.\"       Therapy Time   Individual Concurrent Group Co-treatment   Time In 1535         Time Out 1602         Minutes 27         Timed Code Treatment Minutes: 27 Minutes       If patient is discharged prior to next treatment session, this note will serve as the discharge summary.  Anna Herrera, OTR/L #[id number]

## 2024-08-08 NOTE — PROGRESS NOTES
Ph: [phone], Fax: [phone]           [facility]               Reason for admission:                 Pain abdomen nausea and vomiting    Brief Summary :     Agustina Fried is being seen by nephrology for  pain abdomen nausea and vomiting.      Interval History and plan:   Patient seen at bedside  Comfortable  /76  On room air and denied SOB  Urine output 900  NG aspirate 300 ml  Cr 1.1  K 3.8  Na 137  Phos 2.4          On TPN  Follow volume status. Does not appear volume overloaded  Replete lytes as needed.  IV phos replacement is ordered.   On Clonidine patch for hypertension. Follow BP  Check urine protein Cr ratio and albuminuria.                          Assessment :       CKD Stage IIIa  Creatinine has been variable in the past she has a history of recurrent SHARRON  Last creatinine was 2 upon discharge from 7/2 hospitalization  she has history of diabetes mellitus hypertension    CHF  Echo (7/2/24)    Limited only for LVEF and RVF.    Image quality is adequate.    Left Ventricle: Normal left ventricular systolic function with a visually estimated EF of 55 - 60%. Normal wall motion.    Right ventricle size is normal. Normal systolic function.    Does not appear volume overloaded      Hypertension   BP: (170)/(68)  Pulse:  [64]   BP goal inpatient 130-140 systolic inpatient        [facility] would like to thank Victor M Almaguer MD   for opportunity to serve this patient      Please call with questions at-   24 Hrs Answering service [phone] or  7 am- 5 pm via Perfect serve or cell phone  Dr.Muhammad MARINA Trimble MD       HPI :     Agustina Fried is a 64 y.o. female presented to   the hospital on 8/2/2024 with  pain abdomen nausea and vomiting.  She is known to have nausea vomiting diarrhea for several days because of which she came to the hospital.  She needed NG tube placement in the emergency room.  Found to have severe gastric distention.  She is known to have CKD and  has had multiple hospitalization in the past        PMH/PSH/SH/Family History:     Past Medical History:   Diagnosis Date    Abnormal brain MRI 7/20/2017    Partially empty sella and minimal chronic small vessel ischemic disease    Acute bilateral low back pain without sciatica 11/2/2016    SHARRON (acute kidney injury) ([facility]) 7/5/2017    Arthritis     back    Bipolar disorder ([facility]) 10/18/2008    CAD (coronary artery disease)     stent placed 6/8/20    Cancer ([facility]) 2015    bilateral breast:s/p lumpectomy/radiation:under care care of breast specialist:Dr. Boone     Carotid stenosis, bilateral:<50%:per US 7/2016 7/15/2016    Carpal tunnel syndrome 10/18/2008    Cervical cancer screening 2014    Nml per pt'.    Coronary artery disease of native artery of native heart with stable angina pectoris ([facility]) 6/9/2020    DDD (degenerative disc disease), lumbar 7/18/2018    Depression     under care of pschiatrist:Dr. 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 myelopathy or radiculopathy 3/10/2017    Transient cerebral ischemia 07/15/2016    TIA:7/10/16    Unspecified cerebral artery occlusion with cerebral infarction     TIA          Medication:     Current Facility-Administered Medications: ALTEplase (CATHFLO) injection 1 mg, 1 mg, IntraCATHeter, Once  PN-Adult Premix 5/20 - Standard Electrolytes -  acetaminophen (TYLENOL) suppository 650 mg, 650 mg, Rectal, Q6H PRN  glucose chewable tablet 16 g, 4 tablet, Oral, PRN  dextrose bolus 10% 125 mL, 125 mL, IntraVENous, PRN **OR** dextrose bolus 10% 250 mL, 250 mL, IntraVENous, PRN  glucagon injection 1 mg, 1 mg, SubCUTAneous, PRN  dextrose 10 % infusion, , IntraVENous, Continuous PRN    Vitals :     Vitals:    08/08/24 0228   BP: (!) 170/68   Pulse: 64   Resp:    Temp: 98.4 °F (36.9 °C)   SpO2: 100%          Physical Examination :     Appearance: Alert, awake. NAD.   Respiratory:  No RD on room air.   Cardiovascular: Edema none  Abdomen:  soft  Other relevant findings: NG tube placed.    Labs :     CBC:   Recent Labs     08/06/24  0550 08/07/24  0445 08/08/24  0606   WBC 8.4 7.7 8.6   HGB 9.0* 8.8* 9.0*   HCT 27.3* 26.8* 26.7*    164 153       BMP:    Recent Labs     08/06/24  0550 08/07/24  0445 08/07/24  1720 08/07/24  1813 08/08/24  0555     137 136 124* 128*  --    K 3.2*  3.1* 3.6 6.0* 5.3*  --      108 108 98* 100  --    CO2 21  21 21 18* 19*  --    BUN 14 17  --  16  --    CREATININE 1.2 1.3*  --  1.1  --    GLUCOSE 240* 114*  --  1,198*  --    MG 2.30 1.90  --   --  1.80   PHOS 2.4* 2.6  --  5.3* 2.5       Lab Results   Component Value Date/Time    COLORU Yellow 07/31/2024 04:34 AM    NITRU Negative 07/31/2024 04:34 AM    GLUCOSEU Negative 07/31/2024 04:34 AM    GLUCOSEU >=1000 mg/dL 06/07/2010 03:38 PM    KETUA Negative 07/31/2024 04:34 AM    UROBILINOGEN 0.2 07/31/2024 04:34 AM    BILIRUBINUR Negative 07/31/2024 04:34 AM        ----------------------------------------------------------  Please call with questions at      24 Hrs Answering service [phone]  Perfect serve, or cell phone 7 am - 5pm  MD dave [email or web address]

## 2024-08-08 NOTE — PLAN OF CARE
Problem: Safety - Adult  Goal: Free from fall injury  8/8/2024 0959 by Valentin Van RN  Outcome: Progressing  8/7/2024 2124 by Cinthya Shankar RN  Outcome: Progressing     Problem: Chronic Conditions and Co-morbidities  Goal: Patient's chronic conditions and co-morbidity symptoms are monitored and maintained or improved  8/8/2024 0959 by Valentin Van RN  Outcome: Progressing  8/7/2024 2124 by Cinthya Shankar RN  Outcome: Progressing     Problem: Discharge Planning  Goal: Discharge to home or other facility with appropriate resources  8/8/2024 0959 by Valentin Van RN  Outcome: Progressing  8/7/2024 2124 by Cinthya Shankar RN  Outcome: Progressing     Problem: ABCDS Injury Assessment  Goal: Absence of physical injury  8/7/2024 2124 by Cinthya Shankar RN  Outcome: Progressing

## 2024-08-08 NOTE — PLAN OF CARE
Problem: Safety - Adult  Goal: Free from fall injury  Outcome: Progressing     Problem: Chronic Conditions and Co-morbidities  Goal: Patient's chronic conditions and co-morbidity symptoms are monitored and maintained or improved  8/7/2024 2124 by Cinthya Shankar, RN  Outcome: Progressing  8/7/2024 1007 by Janae Jensen, RN  Flowsheets (Taken 8/5/2024 2047 by Michael Redmond, PONCHO)  Care Plan - Patient's Chronic Conditions and Co-Morbidity Symptoms are Monitored and Maintained or Improved: Monitor and assess patient's chronic conditions and comorbid symptoms for stability, deterioration, or improvement     Problem: Discharge Planning  Goal: Discharge to home or other facility with appropriate resources  Outcome: Progressing     Problem: ABCDS Injury Assessment  Goal: Absence of physical injury  Outcome: Progressing     Problem: Skin/Tissue Integrity  Goal: Absence of new skin breakdown  Description: 1.  Monitor for areas of redness and/or skin breakdown  2.  Assess vascular access sites hourly  3.  Every 4-6 hours minimum:  Change oxygen saturation probe site  4.  Every 4-6 hours:  If on nasal continuous positive airway pressure, respiratory therapy assess nares and determine need for appliance change or resting period.  Outcome: Progressing

## 2024-08-08 NOTE — PROGRESS NOTES
ONCOLOGY HEMATOLOGY CARE PROGRESS NOTE      SUBJECTIVE:    Patient was undergoing physical therapy at the time of my evaluation.  Sitting comfortably in the chair.  Denies any abdominal pain.  However, appears to be confused and I am not sure if she is aware of her medical condition.  No family member was present at the time of my evaluation.  I asked simple questions but patient did not appear to be aware of her surroundings, continued lipsmacking movements and arm movements      ROS:     Denies any abdominal pain, fever, overt bleeding.    OBJECTIVE        Physical    VITALS:  Patient Vitals for the past 24 hrs:   BP Temp Temp src Pulse Resp SpO2 Weight   08/08/24 1554 (!) 173/74 98.7 °F (37.1 °C) Oral 65 18 100 % --   08/08/24 1241 (!) 156/76 97.8 °F (36.6 °C) Oral 57 16 -- --   08/08/24 1201 (!) 156/78 97.8 °F (36.6 °C) Axillary -- -- -- --   08/08/24 1144 (!) 161/80 98.7 °F (37.1 °C) Oral 57 -- 100 % --   08/08/24 0920 (!) 164/57 98.8 °F (37.1 °C) Oral 62 -- 99 % --   08/08/24 0600 -- -- -- -- -- -- 77.8 kg (171 lb 8.3 oz)   08/08/24 0228 (!) 170/68 98.4 °F (36.9 °C) Oral 64 -- 100 % --   08/08/24 0024 -- -- -- -- 16 -- --   08/07/24 2354 -- -- -- -- 16 -- --   08/07/24 2238 (!) 155/69 98 °F (36.7 °C) Oral 59 16 100 % --   08/07/24 2053 (!) 169/67 98.4 °F (36.9 °C) Oral 63 16 100 % --       24HR INTAKE/OUTPUT:    Intake/Output Summary (Last 24 hours) at 8/8/2024 1617  Last data filed at 8/8/2024 1340  Gross per 24 hour   Intake 2717.44 ml   Output 2700 ml   Net 17.44 ml       CONSTITUTIONAL: awake, alert, cooperative, no apparent distress   EYES: pupils equal, round and reactive to light, sclera clear and conjunctiva normal  ENT: Normocephalic, without obvious abnormality, atraumatic  NECK: supple, symmetrical, no jugular venous distension and no carotid bruits     LUNGS: no increased work of breathing and clear to auscultation   CARDIOVASCULAR: regular rate and rhythm,  Morbid obesity due to excess calories (HCC)    Age-related nuclear cataract of both eyes    Hypermetropia, bilateral    Hypertensive retinopathy, bilateral    Vitreous degeneration, bilateral    Acute bilateral low back pain with left-sided sciatica    History of CVA (cerebrovascular accident) without residual deficits    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 stage 3 chronic kidney disease (HCC)    S/P mastectomy, right    Acute cystitis without hematuria    Hypomagnesemia    Chest pain    CAD S/P percutaneous coronary angioplasty    Hyperglycemia due to diabetes mellitus (HCC)    Hx of heart artery stent    Nuclear senile cataract    Unintentional weight loss    Chronic anemia    Facial abscess    Asymptomatic bacteriuria    Acute encephalopathy    Tobacco use disorder    Severe malnutrition (HCC)    Acute right eye pain    Suspected condition    Bipolar affective disorder, currently depressed, moderate (HCC)    Seasonal allergies    Symptomatic bradycardia    Dyspnea    Diffuse pain    Hypoglycemia    Acquired absence of other specified parts of digestive tract    Atherosclerotic heart disease of native coronary artery without angina pectoris    Cognitive communication deficit    Hypertensive heart and chronic kidney disease with heart failure and stage 1 through stage 4 chronic kidney disease, or unspecified chronic kidney disease (HCC)    Muscle weakness (generalized)    Occlusion and stenosis of bilateral carotid arteries    Other lack of coordination    Repeated falls    Unspecified abnormalities of gait and mobility    Acute diarrhea    Hypertensive urgency    Acute renal failure superimposed on chronic kidney disease, unspecified acute renal failure type, unspecified CKD stage (HCC)    Atypical chest pain    Confusion    Chronic kidney disease    C. difficile colitis    Encephalopathy, metabolic    DM (diabetes mellitus), secondary, with complications ([facility])

## 2024-08-09 ENCOUNTER — ANESTHESIA EVENT (OUTPATIENT)
Dept: OPERATING ROOM | Age: 64
End: 2024-08-09
Payer: MEDICAID

## 2024-08-09 LAB
ALBUMIN SERPL-MCNC: 1.7 G/DL (ref 3.4–5)
ALBUMIN SERPL-MCNC: 1.8 G/DL (ref 3.4–5)
ALBUMIN SERPL-MCNC: 1.8 G/DL (ref 3.4–5)
ALP SERPL-CCNC: 128 U/L (ref 40–129)
ALT SERPL-CCNC: 6 U/L (ref 10–40)
ANION GAP SERPL CALCULATED.3IONS-SCNC: 7 MMOL/L (ref 3–16)
ANION GAP SERPL CALCULATED.3IONS-SCNC: 8 MMOL/L (ref 3–16)
AST SERPL-CCNC: 11 U/L (ref 15–37)
BILIRUB DIRECT SERPL-MCNC: <0.2 MG/DL (ref 0–0.3)
BILIRUB INDIRECT SERPL-MCNC: ABNORMAL MG/DL (ref 0–1)
BILIRUB SERPL-MCNC: <0.2 MG/DL (ref 0–1)
BUN SERPL-MCNC: 20 MG/DL (ref 7–20)
BUN SERPL-MCNC: 24 MG/DL (ref 7–20)
CALCIUM SERPL-MCNC: 7.2 MG/DL (ref 8.3–10.6)
CALCIUM SERPL-MCNC: 7.4 MG/DL (ref 8.3–10.6)
CHLORIDE SERPL-SCNC: 110 MMOL/L (ref 99–110)
CHLORIDE SERPL-SCNC: 111 MMOL/L (ref 99–110)
CO2 SERPL-SCNC: 19 MMOL/L (ref 21–32)
CO2 SERPL-SCNC: 22 MMOL/L (ref 21–32)
CREAT SERPL-MCNC: 1.2 MG/DL (ref 0.6–1.2)
CREAT SERPL-MCNC: 1.2 MG/DL (ref 0.6–1.2)
DEPRECATED RDW RBC AUTO: 14.1 % (ref 12.4–15.4)
GFR SERPLBLD CREATININE-BSD FMLA CKD-EPI: 50 ML/MIN/{1.73_M2}
GFR SERPLBLD CREATININE-BSD FMLA CKD-EPI: 50 ML/MIN/{1.73_M2}
GLUCOSE BLD-MCNC: 106 MG/DL (ref 70–99)
GLUCOSE BLD-MCNC: 114 MG/DL (ref 70–99)
GLUCOSE BLD-MCNC: 125 MG/DL (ref 70–99)
GLUCOSE BLD-MCNC: 143 MG/DL (ref 70–99)
GLUCOSE BLD-MCNC: 213 MG/DL (ref 70–99)
GLUCOSE BLD-MCNC: 270 MG/DL (ref 70–99)
GLUCOSE SERPL-MCNC: 152 MG/DL (ref 70–99)
GLUCOSE SERPL-MCNC: 161 MG/DL (ref 70–99)
HCT VFR BLD AUTO: 24.3 % (ref 36–48)
HGB BLD-MCNC: 8 G/DL (ref 12–16)
MAGNESIUM SERPL-MCNC: 1.6 MG/DL (ref 1.8–2.4)
MCH RBC QN AUTO: 29 PG (ref 26–34)
MCHC RBC AUTO-ENTMCNC: 32.7 G/DL (ref 31–36)
MCV RBC AUTO: 88.5 FL (ref 80–100)
PERFORMED ON: ABNORMAL
PHOSPHATE SERPL-MCNC: 2.7 MG/DL (ref 2.5–4.9)
PHOSPHATE SERPL-MCNC: 2.8 MG/DL (ref 2.5–4.9)
PHOSPHATE SERPL-MCNC: 2.8 MG/DL (ref 2.5–4.9)
PLATELET # BLD AUTO: 133 K/UL (ref 135–450)
PMV BLD AUTO: 9.5 FL (ref 5–10.5)
POTASSIUM SERPL-SCNC: 3.3 MMOL/L (ref 3.5–5.1)
POTASSIUM SERPL-SCNC: 3.4 MMOL/L (ref 3.5–5.1)
PROT SERPL-MCNC: 5.8 G/DL (ref 6.4–8.2)
RBC # BLD AUTO: 2.75 M/UL (ref 4–5.2)
SODIUM SERPL-SCNC: 138 MMOL/L (ref 136–145)
SODIUM SERPL-SCNC: 139 MMOL/L (ref 136–145)
WBC # BLD AUTO: 7.7 K/UL (ref 4–11)

## 2024-08-09 PROCEDURE — 36592 COLLECT BLOOD FROM PICC: CPT

## 2024-08-09 PROCEDURE — 97116 GAIT TRAINING THERAPY: CPT

## 2024-08-09 PROCEDURE — 99233 SBSQ HOSP IP/OBS HIGH 50: CPT | Performed by: SURGERY

## 2024-08-09 PROCEDURE — 2580000003 HC RX 258: Performed by: INTERNAL MEDICINE

## 2024-08-09 PROCEDURE — 36415 COLL VENOUS BLD VENIPUNCTURE: CPT

## 2024-08-09 PROCEDURE — 85027 COMPLETE CBC AUTOMATED: CPT

## 2024-08-09 PROCEDURE — 84100 ASSAY OF PHOSPHORUS: CPT

## 2024-08-09 PROCEDURE — 6360000002 HC RX W HCPCS: Performed by: INTERNAL MEDICINE

## 2024-08-09 PROCEDURE — 2500000003 HC RX 250 WO HCPCS

## 2024-08-09 PROCEDURE — 99222 1ST HOSP IP/OBS MODERATE 55: CPT | Performed by: INTERNAL MEDICINE

## 2024-08-09 PROCEDURE — 1200000000 HC SEMI PRIVATE

## 2024-08-09 PROCEDURE — 6360000002 HC RX W HCPCS: Performed by: HOSPITALIST

## 2024-08-09 PROCEDURE — 2580000003 HC RX 258: Performed by: HOSPITALIST

## 2024-08-09 PROCEDURE — 80069 RENAL FUNCTION PANEL: CPT

## 2024-08-09 PROCEDURE — 6370000000 HC RX 637 (ALT 250 FOR IP): Performed by: INTERNAL MEDICINE

## 2024-08-09 PROCEDURE — 80076 HEPATIC FUNCTION PANEL: CPT

## 2024-08-09 PROCEDURE — 97530 THERAPEUTIC ACTIVITIES: CPT

## 2024-08-09 PROCEDURE — 6370000000 HC RX 637 (ALT 250 FOR IP)

## 2024-08-09 PROCEDURE — 83735 ASSAY OF MAGNESIUM: CPT

## 2024-08-09 RX ORDER — CLONIDINE 0.3 MG/24H
1 PATCH, EXTENDED RELEASE TRANSDERMAL WEEKLY
Status: DISCONTINUED | OUTPATIENT
Start: 2024-08-09 | End: 2024-08-22 | Stop reason: HOSPADM

## 2024-08-09 RX ADMIN — HYDROMORPHONE HYDROCHLORIDE 0.5 MG: 1 INJECTION, SOLUTION INTRAMUSCULAR; INTRAVENOUS; SUBCUTANEOUS at 21:45

## 2024-08-09 RX ADMIN — ASCORBIC ACID, VITAMIN A PALMITATE, CHOLECALCIFEROL, THIAMINE HYDROCHLORIDE, RIBOFLAVIN-5 PHOSPHATE SODIUM, PYRIDOXINE HYDROCHLORIDE, NIACINAMIDE, DEXPANTHENOL, ALPHA-TOCOPHEROL ACETATE, VITAMIN K1, FOLIC ACID, BIOTIN, CYANOCOBALAMIN: 200; 3300; 200; 6; 3.6; 6; 40; 15; 10; 150; 600; 60; 5 INJECTION, SOLUTION INTRAVENOUS at 17:47

## 2024-08-09 RX ADMIN — VANCOMYCIN HYDROCHLORIDE 500 MG: 1 INJECTION, POWDER, LYOPHILIZED, FOR SOLUTION INTRAVENOUS at 14:40

## 2024-08-09 RX ADMIN — SODIUM CHLORIDE, PRESERVATIVE FREE 40 MG: 5 INJECTION INTRAVENOUS at 10:11

## 2024-08-09 RX ADMIN — SODIUM CHLORIDE, PRESERVATIVE FREE 40 MG: 5 INJECTION INTRAVENOUS at 21:47

## 2024-08-09 RX ADMIN — HYDROMORPHONE HYDROCHLORIDE 0.5 MG: 1 INJECTION, SOLUTION INTRAMUSCULAR; INTRAVENOUS; SUBCUTANEOUS at 14:39

## 2024-08-09 RX ADMIN — VANCOMYCIN HYDROCHLORIDE 500 MG: 1 INJECTION, POWDER, LYOPHILIZED, FOR SOLUTION INTRAVENOUS at 10:10

## 2024-08-09 RX ADMIN — INSULIN LISPRO 8 UNITS: 100 INJECTION, SOLUTION INTRAVENOUS; SUBCUTANEOUS at 00:50

## 2024-08-09 RX ADMIN — MEROPENEM 1000 MG: 1 INJECTION INTRAVENOUS at 03:38

## 2024-08-09 RX ADMIN — SODIUM CHLORIDE, PRESERVATIVE FREE 10 ML: 5 INJECTION INTRAVENOUS at 21:45

## 2024-08-09 RX ADMIN — VANCOMYCIN HYDROCHLORIDE 500 MG: 1 INJECTION, POWDER, LYOPHILIZED, FOR SOLUTION INTRAVENOUS at 21:55

## 2024-08-09 RX ADMIN — METRONIDAZOLE 500 MG: 500 INJECTION, SOLUTION INTRAVENOUS at 06:40

## 2024-08-09 RX ADMIN — VANCOMYCIN HYDROCHLORIDE 500 MG: 1 INJECTION, POWDER, LYOPHILIZED, FOR SOLUTION INTRAVENOUS at 04:38

## 2024-08-09 RX ADMIN — METRONIDAZOLE 500 MG: 500 INJECTION, SOLUTION INTRAVENOUS at 21:54

## 2024-08-09 RX ADMIN — THIAMINE HYDROCHLORIDE 100 MG: 100 INJECTION, SOLUTION INTRAMUSCULAR; INTRAVENOUS at 10:11

## 2024-08-09 RX ADMIN — METRONIDAZOLE 500 MG: 500 INJECTION, SOLUTION INTRAVENOUS at 14:39

## 2024-08-09 RX ADMIN — INSULIN LISPRO 4 UNITS: 100 INJECTION, SOLUTION INTRAVENOUS; SUBCUTANEOUS at 17:47

## 2024-08-09 ASSESSMENT — PAIN SCALES - GENERAL
PAINLEVEL_OUTOF10: 7
PAINLEVEL_OUTOF10: 7
PAINLEVEL_OUTOF10: 3

## 2024-08-09 NOTE — CONSULTS
[facility]  Cardiology Inpatient Consult Service                                                                                          Pt Name: Agustina Fried  Age: 64 y.o.  Sex: female  : 1960  Location: 5319/5319-01    Referring Physician: Narciso Jean-Baptiste MD  Primary cardiologist : Herbie Bill [facility]    Reason for Consult:     Reason for Consultation/Chief Complaint: Preop risk evaluation    HPI:      Agustina Fried is a 64 y.o. female with a past medical history of hypertension and CAD who presented to the hospital with nausea or vomiting.  Patient has history of hypertension, hyperlipidemia, diabetes mellitus, gastroesophageal reflux disease, CKD 3.  Has had right nephrectomy at the age of 8 for Wilms tumor.  Has had breast cancer status postradiation and lumpectomy.  Has had colectomy for adenocarcinoma.  Has a duodenal mass.  Patient denies any recent myocardial infarction.  No increasing shortness of breath.  No leg swelling.  No paroxysmal dyspnea.  No orthopnea.  Has reduced but stable exercise tolerance.      Histories     Past Medical History:   has a past medical history of Abnormal brain MRI, Acute bilateral low back pain without sciatica, SHARRON (acute kidney injury) (HCC), Arthritis, Bipolar disorder (HCC), CAD (coronary artery disease), Cancer (HCC), Carotid stenosis, bilateral:<50%:per [place] 2016, Carpal tunnel syndrome, Cervical cancer screening, Coronary artery disease of native artery of native heart with stable angina pectoris (HCC), DDD (degenerative disc disease), lumbar, Depression, Depression/anxiety, Depression/anxiety, Diabetes mellitus (HCC), Gout, History of mammogram, History of therapeutic radiation, Hyperlipidemia, Hypertension, 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  Neurologic: No focal deficits. A, O x 3       Labs:   Reviewed  Recent Labs     08/07/24  0445 08/07/24  1720 08/07/24  1813 08/08/24  0555 08/08/24  1040 08/09/24  0655    124* 128* 137 137 139   K 3.6 6.0* 5.3* 3.8 3.8 3.3*   BUN 17  --  16 17 17 20   CREATININE 1.3*  --  1.1 1.1 1.1 1.2    98* 100 107 109 110   CO2 21 18* 19* 21 22 22   GLUCOSE 114*  --  1,198* 136* 88 152*   CALCIUM 7.3*  --  7.4* 7.2* 7.3* 7.2*   MG 1.90  --   --  1.80  --  1.60*     Recent Labs     08/07/24  0445 08/08/24  0606 08/09/24  0655   WBC 7.7 8.6 7.7   HGB 8.8* 9.0* 8.0*   HCT 26.8* 26.7* 24.3*    153 133*   MCV 89.1 88.2 88.5   ]    Lab Results   Component Value Date    CKTOTAL 208 (H) 12/10/2023    TROPONINI <0.01 03/04/2023           Imaging:     I personally reviewed imaging studies including CXR, Stress test, TTE/BREEZY.    Last ECG (if available) - I have reviewed EKG with the following interpretation  Sinus rhythm with PAC    Telemetry: Personally interpreted  Sinus tachycardia    Last Stress (if available): 11/28/2023  No definitive evidence of myocardial ischemia or scar.   There is a small-moderate sized inferior defect, favored to be secondary to   subdiaphragmatic attenuation artifact.   Low normal left ventricular systolic function with calculated LVEF of 54%.   Overall findings represent a low risk scan.  Last TTE/BREEZY(if available): 1/19/2024  Left ventricular systolic function is low normal with a visually estimated   ejection fraction of 50-55%   The left ventricle is normal in size with mild concentric hypertrophy.   No obvious regional wall motion abnormalities noted.   Indeterminate diastolic function.   Mitral valve leaflets appear mildly thickened.   The left atrium appears normal in

## 2024-08-09 NOTE — PROGRESS NOTES
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  Electrolytes:  Clinimix-E includes standard electrolyte formulation.  Recommend further repletion with supplemental IVPBs as needed.  Maintenance IVF:  n/a  Glucose control:    Pt has h/o DM.  Takes Lantus 8 units daily + Humalog 4 units TID with meals at home.  Glucoses ranged 143-270 since TPN started last evening despite increasing insulin in TPN yesterday (likely due to advancement to goal rate).  Used 8 units high dose SSI + Lantus 10 units qhs (ordered by hospitalist).  Discussed with hospitalist this AM - since TPN is providing bulk of calories / carb, will continue adjusting regular insulin in TPN and d/c Lantus.  This will prevent multiple providers from adjusting insulin doses (potentially causing hypoglycemia).  Also safer if issues with line and TPN is stopped (insulin will then also stop vs Lantus that would be continuous).  Will increase Regular insulin administered to 45 units / 24 hrs in TPN bag for this evening (to account for elevated glucoses and discontinuation of Lantus).  Will monitor glucoses and adjust insulin in TPN bag as needed.     Add MVI 10 mL/day in PN on Mon-Wed-Fri only (due to national shortage) & Trace Elements 1 mL/day in PN daily.  Daily renal panel, daily magnesium, and weekly triglycerides ordered per protocol.  PN will be re-ordered daily.    Please call with questions--  Thanks--  Valorie Tam, PharmD, BCPS, BCGP  [phone] ([facility])

## 2024-08-09 NOTE — PROGRESS NOTES
General Surgery   Daily Progress Note  Patient: Agustina Fried      CC: Duodenal mass    SUBJECTIVE:   No acute events overnight. Tolerating sips of soda without nausea/vomiting. No ROBF.     ROS:   A 14 point review of systems was conducted, significant findings as noted above. All other systems negative.    OBJECTIVE:    PHYSICAL EXAM:    Vitals:    08/08/24 2354 08/09/24 0358 08/09/24 0600 08/09/24 0734   BP: (!) 132/58 (!) 151/72  (!) 151/57   Pulse: 98   58   Resp: 20 20 18   Temp: 99.1 °F (37.3 °C)   98.4 °F (36.9 °C)   TempSrc: Oral   Oral   SpO2: (!) 75% 100%  99%   Weight:   77.6 kg (171 lb)    Height:           General appearance: Alert, no acute distress  Chest/Lungs: Normal effort with no accessory muscle use on RA  Cardiovascular: well perfused  Abdomen: Soft, non-distended, non-tender, no rebound, guarding, or rigidity. Well-healed midline ex lap scar; NGT in place.  Skin: Warm and dry, no rashes  Extremities: No edema, no cyanosis  Neuro: Alert, sensation intact      ASSESSMENT & PLAN:   This is a 64 y.o. female with Hx of HTN, DM, GERD, hyperlipidemia, CKD stage IIIb and surgical history significant for Wilms tumor s/p right nephrectomy (age 8), breast cancer s/p radiation and lumpectomy (09/2014), invasive adenocarcinoma s/p R colectomy (03/2023) who presented with gastric outlet obstruction symptoms due to large ulcerated mass in the second portion of duodenum.     - Continue NGT and monitoring NGT output  - Continue sips with soda  - Continue PICC/TPN  - Consent obtained from POA son, will re-discuss with son today for further questions/concerns  - Plan for OR on Monday  - Remainder of care per primary      Emily Jeff DO, Violeta  PGY-3, General Surgery  08/09/24  8:48 AM  Heidi

## 2024-08-09 NOTE — PROGRESS NOTES
V2.0    [facility] [place]      Name:  Agustina Fried /Age/Sex: 1960  (64 y.o. female)   MRN & CSN:  [id number] & [id number] Encounter Date/Time: 2024 11:32 AM EDT   Location:  5319/5319-01 PCP: Viridiana Blanco, APRN - NP     Attending:Narciso Jean-Baptiste MD       Hospital Day: 8    Assessment and Recommendations   Agustina Fried is a 64 y.o. 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      It does not look like she followed after her EGD results and she did not follow with oncology since . She was also recommended to have an EUS for evaluation of elevated alk phos and double ductal sign on imaging, but she has failed to follow-up numerous times      Pt was admitted - with acute CHF exacerbation and ESBL Ecoli UTI treated with meropenem. She presented to the ED  with  nausea, vomiting, diarrhea , abd pain and rectal bleeding. CT showed ileus with severe gastric distension. She was seen by general surgery and had NG. She was admitted to [facility]. Hb dropped from 8.1 to 6.9. She was seen by GI who did an EGD that showed duodenal malignancy resulting in obstruction . CT chest showed Patchy ground-glass opacity within the left lower lobe with superimposed  micronodularity; correlate for pneumonitis.  Metastatic disease cannot be entirely excluded. Chronic heterogeneous attenuation of thoracic and cervical vertebral bodies, as well as sternum, which could reflect metabolic bone disease or chronic metastatic disease.     Pt and family decided on aggressive  of Cdiff infection  Hx of renal cell carcinoma/Wilms tumor   Hx of breast cancer  Hx of colon cancer diagnosed by colonoscopy s/p R colectomy (03/23)   -Oral meds on hold.       Medical Decision Making:  The following items were considered in medical decision making:  Discussion of patient care with other providers  Reviewed clinical lab tests if any  Reviewed radiology tests if any  Reviewed other diagnostic tests/interventions  Independent review of radiologic images if any  Microbiology cultures and other micro tests if any        Subjective:     Covering her face with the sheet. Stated she is ok. Did not want to talk much   Denies any abd pain, no n/v/d.       Review of Systems:      Pertinent positives and negatives discussed in HPI    Objective:     Intake/Output Summary (Last 24 hours) at 8/9/2024 1213  Last data filed at 8/9/2024 1027  Gross per 24 hour   Intake 280 ml   Output 1300 ml   Net -1020 ml      Vitals:   Vitals:    08/09/24 0358 08/09/24 0600 08/09/24 0734 08/09/24 1027   BP: (!) 151/72  (!) 151/57 (!) 148/65   Pulse:   58 64   Resp: 20  18 16   Temp:   98.4 °F (36.9 °C) 99 °F (37.2 °C)   TempSrc:   Oral Oral   SpO2: 100%  99% 99%   Weight:  77.6 kg (171 lb)     Height:             Physical Exam:      General: . Chronically ill   Eyes: EOMI  ENT: neck supple. NG  Cardiovascular: Regular rate.  Respiratory: Clear to auscultation  Gastrointestinal: Soft, non tender  Genitourinary: no suprapubic tenderness  Musculoskeletal: No edema  Skin: warm, dry  Neuro: Alert.  Psych: Mood appropriate.         Medications:   Medications:    cloNIDine  1 patch TransDERmal Weekly    metroNIDAZOLE  500 mg IntraVENous Q8H    insulin lispro  0-16 Units SubCUTAneous 4 times per day    pantoprazole (PROTONIX) 40 mg in sodium chloride (PF) 0.9 % 10 mL injection  40 mg IntraVENous Q12H    sodium chloride flush  5-40 mL IntraVENous 2 times per day    [Held by provider] enoxaparin  40 mg SubCUTAneous Daily    thiamine  100     UROBILINOGEN 0.2 07/31/2024 04:34 AM    BILIRUBINUR Negative 07/31/2024 04:34 AM    BLOODU MODERATE 07/31/2024 04:34 AM    GLUCOSEU Negative 07/31/2024 04:34 AM    GLUCOSEU >=1000 mg/dL 06/07/2010 03:38 PM    KETUA Negative 07/31/2024 04:34 AM    AMORPHOUS 3+ 04/18/2024 04:40 PM     Urine Cultures:   Lab Results   Component Value Date/Time    LABURIN  07/31/2024 04:34 AM     >100,000 CFU/ml  CONTACT PRECAUTIONS INDICATED  Carbapenem antibiotics are the best option for infections caused  by ESBL producing organisms.  Other antibiotic classes are  likely to result in treatment failure, even for organisms  demonstrating in vitro susceptibility.       Blood Cultures:   Lab Results   Component Value Date/Time    BC No Growth after 4 days of incubation. 04/17/2024 06:19 AM     Lab Results   Component Value Date/Time    BLOODCULT2 No Growth after 4 days of incubation. 04/17/2024 06:19 AM     Organism:   Lab Results   Component Value Date/Time    ORG C. difficile toxin B gene detected 08/01/2024 12:00 PM         Electronically signed by Narciso Jean-Baptiste MD on 8/9/2024 at 12:13 PM

## 2024-08-09 NOTE — PROGRESS NOTES
Comprehensive Nutrition Assessment    RECOMMENDATIONS:  PO Diet: Continue Clear Liquid; only cola or pepsi sips  Nutrition Supplement: Continue Ensure Clear tid  Nutrition Education: Education not indicated   Nutrition Support; Continue TPN    NUTRITION ASSESSMENT:   Nutritional summary & status: Follow up. Diet upgraded to Clear Liquids; only cola or pepsi sips. Ensure Clear tid ordered.  Continues on TPN, Day 6, now advanced to goal rate of 70 ml/hr. BG elevated; insulin adjusted per pharmacy. K+/mg decrease, repleted. Phos wnl. Noted plans for OR on Monday. Continue current nutrition interventions and monitor tolerance to po diet and TPN.   Admission // PMH: Duodenal mass // hypertension, diabetes, GERD, hyperlipidemia and CKD stage IIIb    MALNUTRITION ASSESSMENT  Context of Malnutrition: Chronic Illness   Malnutrition Status: At risk for malnutrition (Comment) (significant wt loss)  Findings of the 6 clinical characteristics of malnutrition (Minimum of 2 out of 6 clinical characteristics is required to make the diagnosis of moderate or severe Protein Calorie Malnutrition based on AND/ASPEN Guidelines):  Energy Intake:  Mild decrease in energy intake (Comment) (prior to admit, timeframe unknown)  Weight Loss:  Greater than 5% over 1 month (9.5% in 1 month)     Body Fat Loss:  Unable to assess     Muscle Mass Loss:  Unable to assess        NUTRITION DIAGNOSIS   Inadequate oral intake related to altered GI structure as evidenced by weight loss greater than or equal to 5% in 1 month, nutrition support - parenteral nutrition, poor intake prior to admission (9% in 1 month)    Nutrition Monitoring and Evaluation:   Food/Nutrient Intake Outcomes:  Diet Advancement/Tolerance, Food and Nutrient Intake, Supplement Intake, Parenteral Nutrition Intake/Tolerance  Physical Signs/Symptoms Outcomes:  Biochemical Data, Nutrition Focused Physical Findings, Skin, Weight, GI Status     OBJECTIVE DATA: Significant to nutrition  assessment  Nutrition Related Findings: K+ 3.3, Mg 1.7. No edema. LBM 8/09.  Wounds: Pressure Injury, Stage I  Nutrition Goals: Tolerate nutrition support at goal rate, PO intake 50% or greater, by next RD assessment     CURRENT NUTRITION THERAPIES  ADULT ORAL NUTRITION SUPPLEMENT; Breakfast, Lunch, Dinner; Clear Liquid Oral Supplement  ADULT DIET; Clear Liquid; only cola or pepsy, sips  PN-Adult Premix 5/20 - Standard Electrolytes - Central Line  PN-Adult Premix 5/20 - Standard Electrolytes - Central Line  Current Parenteral Nutrition Orders:  Type and Formula: 2-in-1 Standard   Lipids: 250ml, Two times weekly  Duration: Continuous  Current and Goal PN Orders Provides: Clinimix 5/20 @ 70 ml/hr [id number] kcal, 84 g protein, 1680 TV. GIR = 4.0  PO Intake: 0%, 1-25%   PO Supplement Intake:Unable to assess (no data)  Additional Sources of Calories/IVF:n/a     COMPARATIVE STANDARDS  Energy (kcal):  5076-7370 (27-29 kcal/kg CBW)     Protein (g):  75-87 (1.3-1.5 g/kg CBW)       Fluid (ml/day):  1 ml/kcal    ANTHROPOMETRICS  Current Height: 160 cm (5' 2.99\")  Current Weight - Scale: 77.6 kg (171 lb)    Admission weight: 58 kg (127 lb 13.9 oz)    The patient will be monitored per nutrition standards of care. Consult dietitian if additional nutrition interventions are needed prior to RD reassessment.     Mikey Holder RD  [place]:  [phone]

## 2024-08-09 NOTE — PROGRESS NOTES
Physical Therapy  Facility/Department: [address]  Daily Treatment Note  NAME: Agustina Fried  : 1960  MRN: [id number]    Date of Service: 2024    Discharge Recommendations:  Subacute/Skilled Nursing Facility   PT Equipment Recommendations  Equipment Needed:  (defer for now)    Patient Diagnosis(es): Diagnoses of Small bowel obstruction (HCC) and Rectal bleeding were pertinent to this visit.    Assessment   Assessment: Pt progressing with ambulation distance. Continues to require CGA & safety cues. Pt lives alone & has 3 flights of stairs to enter her apartment. Pt would benefit from continued inpt PT upon D/C. Will follow per plan of care.  Activity Tolerance: Patient tolerated treatment well  Equipment Needed:  (defer for now)     Plan    Physical Therapy Plan  General Plan:  (2-5)  Current Treatment Recommendations: Functional mobility training;Transfer training;Gait training;Endurance training;Safety education & training;Therapeutic activities     Restrictions  Position Activity Restriction  Other position/activity restrictions: up as tolerated     Subjective    Subjective  Subjective: Pt supine in bed, sleeping & slow to arouse. Pt agreeable to PT with encouragement.  Pain: pt c/o back pain, not rated. RN aware.     Objective     Bed Mobility Training  Bed Mobility Training: Yes  Supine to Sit: Stand-by assistance;Additional time (HOB elevated)  Balance  Sitting: Intact  Standing - Static:  (SBA with RW)  Standing - Dynamic:  (CGA with RW)  Transfer Training  Transfer Training: Yes (pt impulsive with all mobility)  Interventions: Verbal cues  Sit to Stand: Contact-guard assistance (from bed, chair)  Stand to Sit: Contact-guard assistance  Toilet Transfer: Contact-guard assistance (using grab bar)  Gait  Gait Training: Yes  Overall Level of Assistance: Contact-guard assistance  Distance (ft):  (10 ft x 2, 300 ft)  Assistive Device: Gait belt;Walker, rolling  Speed/Olga: Slow  Gait  Abnormalities: Decreased step clearance        Other Specialty Interventions  Other Treatments/Modalities: pt requesting to use toilet. incontinent of stool & urine on way to toilet. required extensive time for clean-up.  pt toileted & provided own seated don-care. pt dependent to change socks, gown & don brief.  Safety Devices  Type of Devices: Left in chair;Chair alarm in place;Call light within reach;Nurse notified       Goals  Short Term Goals  Time Frame for Short Term Goals: discharge  Short Term Goal 1: sit to/from supine supervision  Short Term Goal 2: sit to/from stand supervision  Short Term Goal 3: ambulate 100 ft with or without AD supervision  Patient Goals   Patient Goals : eventual return home    Education  Patient Education  Education Given To: Patient  Education Provided: Plan of Care;Transfer Training  Education Provided Comments: importance of OOB, mobility  Education Method: Verbal  Education Outcome: Continued education needed    AM-PAC - Mobility    AM-PAC Basic Mobility - Inpatient   How much help is needed turning from your back to your side while in a flat bed without using bedrails?: A Little  How much help is needed moving from lying on your back to sitting on the side of a flat bed without using bedrails?: A Little  How much help is needed moving to and from a bed to a chair?: A Little  How much help is needed standing up from a chair using your arms?: A Little  How much help is needed walking in hospital room?: A Little  How much help is needed climbing 3-5 steps with a railing?: A Lot  AM-PAC Inpatient Mobility Raw Score : 17  AM-PAC Inpatient T-Scale Score : 42.13  Mobility Inpatient CMS 0-100% Score: 50.57  Mobility Inpatient CMS G-Code Modifier : CK         Therapy Time   Individual Concurrent Group Co-treatment   Time In 1158         Time Out 1253         Minutes 55                 [address], PT

## 2024-08-09 NOTE — CARE COORDINATION
Patient admitted from [facility] and would like to return there at d/c. I called Huma who is covering for admissions to update her on this patient.     To OR on Monday for peg placement.   Electronically signed by Elaina Rutherford RN on 8/9/2024 at 3:18 PM  [phone]

## 2024-08-09 NOTE — PROGRESS NOTES
Ph: [phone], Fax: [phone]           [facility]               Reason for admission:                 Pain abdomen nausea and vomiting    Brief Summary :     Agustina Fried is being seen by nephrology for CKD       Interval History and plan:     Seen in room  Appears to be confused  On oxygen no pedal edema  On TPN  Has not been cooperative with care  Per PCA  Electrolytes are overall stable  Blood pressure good  Abdomen is soft        Increase of clonidine patch to 0.3 mg per 24 hours                     Assessment :       CKD Stage IIIa  Creatinine has been variable in the past she has a history of recurrent SHARRON  Last creatinine was 2 upon discharge from 7/2 hospitalization  she has history of diabetes mellitus hypertension    CHF  Echo (7/2/24)    Limited only for LVEF and RVF.    Image quality is adequate.    Left Ventricle: Normal left ventricular systolic function with a visually estimated EF of 55 - 60%. Normal wall motion.    Right ventricle size is normal. Normal systolic function.    Does not appear volume overloaded      Hypertension   BP: (132-151)/(58-72)  Pulse:  [98]   BP goal inpatient 130-140 systolic inpatient        [facility] would like to thank Victor M Almaguer MD   for opportunity to serve this patient      Please call with questions at-   24 Hrs Answering service [phone] or  7 am- 5 pm via Perfect serve or cell phone  Dr.Sudhir Temo MD       HPI :     Agustina Fried is a 64 y.o. female presented to   the hospital on 8/2/2024 with  pain abdomen nausea and vomiting.  She is known to have nausea vomiting diarrhea for several days because of which she came to the hospital.  She needed NG tube placement in the emergency room.  Found to have severe gastric distention.  She is known to have CKD and has had multiple hospitalization in the past        PMH/PSH/SH/Family History:     Past Medical History:   Diagnosis Date    Abnormal brain MRI 7/20/2017     Partially empty sella and minimal chronic small vessel ischemic disease    Acute bilateral low back pain without sciatica 11/2/2016    SHARRON (acute kidney injury) ([facility]) 7/5/2017    Arthritis     back    Bipolar disorder ([facility]) 10/18/2008    CAD (coronary artery disease)     stent placed 6/8/20    Cancer ([facility]) 2015    bilateral breast:s/p lumpectomy/radiation:under care care of breast specialist:Dr. Boone     Carotid stenosis, bilateral:<50%:per US 7/2016 7/15/2016    Carpal tunnel syndrome 10/18/2008    Cervical cancer screening 2014    Nml per pt'.    Coronary artery disease of native artery of native heart with stable angina pectoris ([facility]) 6/9/2020    DDD (degenerative disc disease), lumbar 7/18/2018    Depression     under care of pschiatrist:Dr. Rojas    Depression/anxiety 7/5/2017    Depression/anxiety     Diabetes mellitus ([facility])     Gout     History of mammogram 10/28/2016;8/14/17    Negative    History of therapeutic radiation     Hyperlipidemia     Hypertension     Hypertensive heart and kidney disease with chronic systolic congestive heart failure and stage 3 chronic kidney disease ([facility]) 9/17/2017    Microalbuminuria 7/1/2016    Neuropathy in diabetes ([facility])     Non morbid obesity 7/1/2016    Pancreatitis 5/12/16    [facility] hospitalization 5/12/16-5/16/16:under care of GI:chronic pancreatitis    S/P endoscopy 6/14/2016    [facility]:per pt' & her family member was nml.    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 myelopathy or radiculopathy 3/10/2017    Transient cerebral ischemia 07/15/2016    TIA:7/10/16    Unspecified cerebral artery occlusion with cerebral infarction     TIA          Medication:     Current Facility-Administered Medications: PN-Adult Premix 5/20 - Standard Electrolytes - Central Line, , IntraVENous, Continuous TPN  insulin glargine (LANTUS) injection vial 10 Units, 10 Units, SubCUTAneous, Nightly  phenol 1.4 % mouth spray 1 spray, 1 spray, Mouth/Throat, Q2H PRN  metroNIDAZOLE (FLAGYL) 500  PRN    Vitals :     Vitals:    08/09/24 0358   BP: (!) 151/72   Pulse:    Resp: 20   Temp:    SpO2: 100%          Physical Examination :     Appearance: Alert, awake. NAD.   Respiratory:  No RD on room air.   Cardiovascular: Edema none  Abdomen:  soft  Other relevant findings: NG tube placed.    Labs :     CBC:   Recent Labs     08/07/24  0445 08/08/24  0606   WBC 7.7 8.6   HGB 8.8* 9.0*   HCT 26.8* 26.7*    153       BMP:    Recent Labs     08/07/24  0445 08/07/24  1720 08/07/24  1813 08/08/24  0555 08/08/24  1040      < > 128* 137 137   K 3.6   < > 5.3* 3.8 3.8      < > 100 107 109   CO2 21   < > 19* 21 22   BUN 17  --  16 17 17   CREATININE 1.3*  --  1.1 1.1 1.1   GLUCOSE 114*  --  1,198* 136* 88   MG 1.90  --   --  1.80  --    PHOS 2.6  --  5.3* 2.6  2.5 2.4*    < > = values in this interval not displayed.       Lab Results   Component Value Date/Time    COLORU Yellow 07/31/2024 04:34 AM    NITRU Negative 07/31/2024 04:34 AM    GLUCOSEU Negative 07/31/2024 04:34 AM    GLUCOSEU >=1000 mg/dL 06/07/2010 03:38 PM    KETUA Negative 07/31/2024 04:34 AM    UROBILINOGEN 0.2 07/31/2024 04:34 AM    BILIRUBINUR Negative 07/31/2024 04:34 AM        ----------------------------------------------------------  Please call with questions at      24 Hrs Answering service [phone]  Perfect serve, or cell phone 7 am - 5pm  Glen Plascencia MD   [facility]

## 2024-08-09 NOTE — PROGRESS NOTES
I called son Maurice and talked to him about upcoming surgeries and it's complications: especially about the risk of leak in a setting of poor nutrition. He understands the risks and we will recheck nutritional parameters on Sunday 8/11/24. Maurice did not have any additional questions, I answered all of his question. He will stop by tonight. We are available, if he wants to talk more about the surgery

## 2024-08-09 NOTE — PROGRESS NOTES
ONCOLOGY HEMATOLOGY CARE PROGRESS NOTE      SUBJECTIVE:    She is resting quietly at the time of my evaluation. No family at bedside. Discussed case with Palliative Care NP.     OBJECTIVE        Physical    VITALS:  Patient Vitals for the past 24 hrs:   BP Temp Temp src Pulse Resp SpO2 Weight   08/09/24 1027 (!) 148/65 99 °F (37.2 °C) Oral 64 16 99 % --   08/09/24 0734 (!) 151/57 98.4 °F (36.9 °C) Oral 58 18 99 % --   08/09/24 0600 -- -- -- -- -- -- 77.6 kg (171 lb)   08/09/24 0358 (!) 151/72 -- -- -- 20 100 % --   08/08/24 2354 (!) 132/58 99.1 °F (37.3 °C) Oral 98 20 (!) 75 % --   08/08/24 2008 (!) 170/79 98 °F (36.7 °C) Oral 59 16 100 % --   08/08/24 1554 (!) 173/74 98.7 °F (37.1 °C) Oral 65 18 100 % --   08/08/24 1241 (!) 156/76 97.8 °F (36.6 °C) Oral 57 16 -- --   08/08/24 1201 (!) 156/78 97.8 °F (36.6 °C) Axillary -- -- -- --   08/08/24 1144 (!) 161/80 98.7 °F (37.1 °C) Oral 57 -- 100 % --       24HR INTAKE/OUTPUT:    Intake/Output Summary (Last 24 hours) at 8/9/2024 1100  Last data filed at 8/9/2024 1027  Gross per 24 hour   Intake 280 ml   Output 1300 ml   Net -1020 ml       CONSTITUTIONAL: awake, alert, cooperative, no apparent distress   EYES: pupils equal, round and reactive to light, sclera clear and conjunctiva normal  ENT: Normocephalic, without obvious abnormality, atraumatic  NECK: supple, symmetrical, no jugular venous distension and no carotid bruits     LUNGS: no increased work of breathing and clear to auscultation   CARDIOVASCULAR: regular rate and rhythm, normal S1 and S2, no murmur noted  y   MUSCULOSKELETAL: No gross musculoskeletal abnormality  NEUROLOGIC: awake, alert, pleasantly confused secondary to dementia.      SKIN: Normal skin color, texture, turgor and no jaundice. appears intact   EXTREMITIES: no LE edema     DATA:  CBC:    Recent Labs     08/09/24  0655 08/08/24  0606 08/07/24  0445 08/06/24  0550 08/05/24  0547 08/04/24  1051 08/03/24  0736

## 2024-08-09 NOTE — PROGRESS NOTES
VSS, A&O, Pt had x1 BM and voided. NGT hooked to suction. TPN, Lipid, and IV abx infused. Fall precaution in place. Call light within reach.

## 2024-08-10 ENCOUNTER — APPOINTMENT (OUTPATIENT)
Dept: GENERAL RADIOLOGY | Age: 64
End: 2024-08-10
Attending: SURGERY
Payer: MEDICAID

## 2024-08-10 LAB
ALBUMIN SERPL-MCNC: 1.9 G/DL (ref 3.4–5)
ALBUMIN SERPL-MCNC: 2 G/DL (ref 3.4–5)
ANION GAP SERPL CALCULATED.3IONS-SCNC: 8 MMOL/L (ref 3–16)
ANION GAP SERPL CALCULATED.3IONS-SCNC: 8 MMOL/L (ref 3–16)
BUN SERPL-MCNC: 24 MG/DL (ref 7–20)
BUN SERPL-MCNC: 25 MG/DL (ref 7–20)
CALCIUM SERPL-MCNC: 7.3 MG/DL (ref 8.3–10.6)
CALCIUM SERPL-MCNC: 7.4 MG/DL (ref 8.3–10.6)
CHLORIDE SERPL-SCNC: 109 MMOL/L (ref 99–110)
CHLORIDE SERPL-SCNC: 111 MMOL/L (ref 99–110)
CO2 SERPL-SCNC: 20 MMOL/L (ref 21–32)
CO2 SERPL-SCNC: 24 MMOL/L (ref 21–32)
CREAT SERPL-MCNC: 1.2 MG/DL (ref 0.6–1.2)
CREAT SERPL-MCNC: 1.2 MG/DL (ref 0.6–1.2)
DEPRECATED RDW RBC AUTO: 14.5 % (ref 12.4–15.4)
GFR SERPLBLD CREATININE-BSD FMLA CKD-EPI: 50 ML/MIN/{1.73_M2}
GFR SERPLBLD CREATININE-BSD FMLA CKD-EPI: 50 ML/MIN/{1.73_M2}
GLUCOSE BLD-MCNC: 103 MG/DL (ref 70–99)
GLUCOSE BLD-MCNC: 146 MG/DL (ref 70–99)
GLUCOSE BLD-MCNC: 49 MG/DL (ref 70–99)
GLUCOSE BLD-MCNC: 57 MG/DL (ref 70–99)
GLUCOSE BLD-MCNC: 76 MG/DL (ref 70–99)
GLUCOSE BLD-MCNC: 87 MG/DL (ref 70–99)
GLUCOSE BLD-MCNC: 98 MG/DL (ref 70–99)
GLUCOSE BLD-MCNC: 99 MG/DL (ref 70–99)
GLUCOSE SERPL-MCNC: 108 MG/DL (ref 70–99)
GLUCOSE SERPL-MCNC: 40 MG/DL (ref 70–99)
HCT VFR BLD AUTO: 24 % (ref 36–48)
HGB BLD-MCNC: 7.9 G/DL (ref 12–16)
MAGNESIUM SERPL-MCNC: 1.6 MG/DL (ref 1.8–2.4)
MCH RBC QN AUTO: 29.2 PG (ref 26–34)
MCHC RBC AUTO-ENTMCNC: 32.9 G/DL (ref 31–36)
MCV RBC AUTO: 88.8 FL (ref 80–100)
PERFORMED ON: ABNORMAL
PERFORMED ON: NORMAL
PHOSPHATE SERPL-MCNC: 2.6 MG/DL (ref 2.5–4.9)
PHOSPHATE SERPL-MCNC: 2.7 MG/DL (ref 2.5–4.9)
PLATELET # BLD AUTO: 149 K/UL (ref 135–450)
PMV BLD AUTO: 9.3 FL (ref 5–10.5)
POTASSIUM SERPL-SCNC: 3 MMOL/L (ref 3.5–5.1)
POTASSIUM SERPL-SCNC: 4 MMOL/L (ref 3.5–5.1)
RBC # BLD AUTO: 2.7 M/UL (ref 4–5.2)
SODIUM SERPL-SCNC: 137 MMOL/L (ref 136–145)
SODIUM SERPL-SCNC: 143 MMOL/L (ref 136–145)
WBC # BLD AUTO: 8 K/UL (ref 4–11)

## 2024-08-10 PROCEDURE — 6360000002 HC RX W HCPCS: Performed by: INTERNAL MEDICINE

## 2024-08-10 PROCEDURE — 2500000003 HC RX 250 WO HCPCS

## 2024-08-10 PROCEDURE — 83735 ASSAY OF MAGNESIUM: CPT

## 2024-08-10 PROCEDURE — 2580000003 HC RX 258: Performed by: INTERNAL MEDICINE

## 2024-08-10 PROCEDURE — 36415 COLL VENOUS BLD VENIPUNCTURE: CPT

## 2024-08-10 PROCEDURE — 6370000000 HC RX 637 (ALT 250 FOR IP)

## 2024-08-10 PROCEDURE — 74018 RADEX ABDOMEN 1 VIEW: CPT

## 2024-08-10 PROCEDURE — 80069 RENAL FUNCTION PANEL: CPT

## 2024-08-10 PROCEDURE — 85027 COMPLETE CBC AUTOMATED: CPT

## 2024-08-10 PROCEDURE — 99232 SBSQ HOSP IP/OBS MODERATE 35: CPT | Performed by: SURGERY

## 2024-08-10 PROCEDURE — 1200000000 HC SEMI PRIVATE

## 2024-08-10 RX ORDER — POTASSIUM CHLORIDE 7.45 MG/ML
10 INJECTION INTRAVENOUS
Status: COMPLETED | OUTPATIENT
Start: 2024-08-10 | End: 2024-08-10

## 2024-08-10 RX ORDER — DEXTROSE MONOHYDRATE 50 MG/ML
INJECTION, SOLUTION INTRAVENOUS CONTINUOUS
Status: DISCONTINUED | OUTPATIENT
Start: 2024-08-10 | End: 2024-08-11

## 2024-08-10 RX ADMIN — Medication 125 ML: at 00:24

## 2024-08-10 RX ADMIN — SODIUM CHLORIDE, PRESERVATIVE FREE 40 MG: 5 INJECTION INTRAVENOUS at 22:30

## 2024-08-10 RX ADMIN — VANCOMYCIN HYDROCHLORIDE 500 MG: 1 INJECTION, POWDER, LYOPHILIZED, FOR SOLUTION INTRAVENOUS at 04:08

## 2024-08-10 RX ADMIN — VANCOMYCIN HYDROCHLORIDE 500 MG: 1 INJECTION, POWDER, LYOPHILIZED, FOR SOLUTION INTRAVENOUS at 12:07

## 2024-08-10 RX ADMIN — METRONIDAZOLE 500 MG: 500 INJECTION, SOLUTION INTRAVENOUS at 15:51

## 2024-08-10 RX ADMIN — TRACE ELEMENTS INJECTION 4: 7.4; .75; 98; 151 INJECTION, SOLUTION INTRAVENOUS at 18:17

## 2024-08-10 RX ADMIN — POTASSIUM CHLORIDE 10 MEQ: 10 INJECTION, SOLUTION INTRAVENOUS at 12:08

## 2024-08-10 RX ADMIN — POTASSIUM CHLORIDE 10 MEQ: 10 INJECTION, SOLUTION INTRAVENOUS at 14:47

## 2024-08-10 RX ADMIN — POTASSIUM CHLORIDE 10 MEQ: 10 INJECTION, SOLUTION INTRAVENOUS at 10:34

## 2024-08-10 RX ADMIN — POTASSIUM CHLORIDE 10 MEQ: 10 INJECTION, SOLUTION INTRAVENOUS at 09:40

## 2024-08-10 RX ADMIN — HYDROMORPHONE HYDROCHLORIDE 0.5 MG: 1 INJECTION, SOLUTION INTRAMUSCULAR; INTRAVENOUS; SUBCUTANEOUS at 12:05

## 2024-08-10 RX ADMIN — HYDROMORPHONE HYDROCHLORIDE 0.5 MG: 1 INJECTION, SOLUTION INTRAMUSCULAR; INTRAVENOUS; SUBCUTANEOUS at 18:50

## 2024-08-10 RX ADMIN — THIAMINE HYDROCHLORIDE 100 MG: 100 INJECTION, SOLUTION INTRAMUSCULAR; INTRAVENOUS at 07:55

## 2024-08-10 RX ADMIN — SODIUM CHLORIDE, PRESERVATIVE FREE 40 MG: 5 INJECTION INTRAVENOUS at 09:40

## 2024-08-10 RX ADMIN — SODIUM CHLORIDE, PRESERVATIVE FREE 10 ML: 5 INJECTION INTRAVENOUS at 07:56

## 2024-08-10 RX ADMIN — DEXTROSE MONOHYDRATE: 50 INJECTION, SOLUTION INTRAVENOUS at 23:48

## 2024-08-10 RX ADMIN — VANCOMYCIN HYDROCHLORIDE 500 MG: 1 INJECTION, POWDER, LYOPHILIZED, FOR SOLUTION INTRAVENOUS at 22:35

## 2024-08-10 RX ADMIN — METRONIDAZOLE 500 MG: 500 INJECTION, SOLUTION INTRAVENOUS at 22:34

## 2024-08-10 RX ADMIN — DEXTROSE MONOHYDRATE: 50 INJECTION, SOLUTION INTRAVENOUS at 09:37

## 2024-08-10 RX ADMIN — POTASSIUM CHLORIDE 10 MEQ: 10 INJECTION, SOLUTION INTRAVENOUS at 13:15

## 2024-08-10 RX ADMIN — VANCOMYCIN HYDROCHLORIDE 500 MG: 1 INJECTION, POWDER, LYOPHILIZED, FOR SOLUTION INTRAVENOUS at 18:02

## 2024-08-10 RX ADMIN — METRONIDAZOLE 500 MG: 500 INJECTION, SOLUTION INTRAVENOUS at 07:55

## 2024-08-10 ASSESSMENT — PAIN DESCRIPTION - FREQUENCY: FREQUENCY: INTERMITTENT

## 2024-08-10 ASSESSMENT — PAIN SCALES - GENERAL
PAINLEVEL_OUTOF10: 7
PAINLEVEL_OUTOF10: 5
PAINLEVEL_OUTOF10: 9
PAINLEVEL_OUTOF10: 6
PAINLEVEL_OUTOF10: 7

## 2024-08-10 ASSESSMENT — PAIN DESCRIPTION - ORIENTATION
ORIENTATION: LOWER
ORIENTATION: LOWER
ORIENTATION: RIGHT;LEFT;MID

## 2024-08-10 ASSESSMENT — PAIN DESCRIPTION - LOCATION
LOCATION: ABDOMEN

## 2024-08-10 ASSESSMENT — PAIN DESCRIPTION - DESCRIPTORS
DESCRIPTORS: ACHING;DISCOMFORT
DESCRIPTORS: ACHING;DISCOMFORT;SORE
DESCRIPTORS: ACHING;DISCOMFORT

## 2024-08-10 ASSESSMENT — PAIN - FUNCTIONAL ASSESSMENT
PAIN_FUNCTIONAL_ASSESSMENT: PREVENTS OR INTERFERES SOME ACTIVE ACTIVITIES AND ADLS
PAIN_FUNCTIONAL_ASSESSMENT: ACTIVITIES ARE NOT PREVENTED
PAIN_FUNCTIONAL_ASSESSMENT: PREVENTS OR INTERFERES SOME ACTIVE ACTIVITIES AND ADLS

## 2024-08-10 ASSESSMENT — PAIN DESCRIPTION - ONSET: ONSET: GRADUAL

## 2024-08-10 ASSESSMENT — PAIN DESCRIPTION - PAIN TYPE: TYPE: CHRONIC PAIN

## 2024-08-10 NOTE — PROGRESS NOTES
V2.0    [facility] Progress Note      Name:  Agustina Fried /Age/Sex: 1960  (64 y.o. female)   MRN & CSN:  [id number] & [id number] Encounter Date/Time: 8/10/2024 11:32 AM EDT   Location:  5319/5319-01 PCP: Viridiana Blanco, APRN - NP     Attending:Charlie Gunn MD       Hospital Day: 9    Assessment and Recommendations   Agustina Fried is a 64 y.o. 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      It does not look like she followed after her EGD results and she did not follow with oncology since . She was also recommended to have an EUS for evaluation of elevated alk phos and double ductal sign on imaging, but she has failed to follow-up numerous times      Pt was admitted - with acute CHF exacerbation and ESBL Ecoli UTI treated with meropenem. She presented to the ED  with  nausea, vomiting, diarrhea , abd pain and rectal bleeding. CT showed ileus with severe gastric distension. She was seen by general surgery and had NG. She was admitted to [facility]. Hb dropped from 8.1 to 6.9. She was seen by GI who did an EGD that showed duodenal malignancy resulting in obstruction . CT chest showed Patchy ground-glass opacity within the left lower lobe with superimposed  micronodularity; correlate for pneumonitis.  Metastatic disease cannot be entirely excluded. Chronic heterogeneous attenuation of thoracic and cervical vertebral bodies, as well as sternum, which could reflect metabolic bone disease or chronic metastatic disease.     Pt and family decided on aggressive  change to oral Dificid     Possible refeeding syndrome   Hypokalemia   Hypomagnesemia  Hypophosphatemia  -correct electrolytes and monitor closely . Cont thiamine for now     Elevated trop - demand ischemia. Monitor      Elevated ALP - previous MRCP showed .mild intra and extrahepatic biliary ductal dilatation with abrupt tapering of the distal common duct, and pancreatic duct dilatation. EUS was recommended but she never followed up      Acute on chronic anemia - given a unit of blood at [facility]. Hb stable since then     ESBL UTI - last day of meropenem 8/9/2024 (7 days)    HTN- PO meds on hold. Cont clonidine patch      Type II DM with uncontrolled hyperglycemia and hypoglycemia  -Last A1c: 5.4  -Home regimen: glargine 8 units, and SSI  -Hospital regimen: defer to pharmacy to manage insulin w TPN   -Monitor for hypoglycemia due to ISS with serial Accu-checks   -Hypoglycemia protocol     Pre-Operative Risk assessment using 2014 ACC/AHA guidelines     Emergent procedure No  Active Cardiac Condition No (decompensated HF, Arrhythmia, MI <3 weeks, severe valve disease)  Risk Level of Procedure Intermediate Risk (intraperitoneal, intrathoracic, HENT, orthopedic, or carotid endarterectomy, etc.)  Revised Cardiac Risk Index Risk factors: History of heart failure  History of cerebrovascular disease  Diabetic treated with insulin  CKD Cr >2.0 mg/dL  Measurement of Exercise Tolerance before Surgery >4 No    According to the 2014 ACC/AHA pre-operative risk assessment guidelines Agustina Fried is a low risk for major cardiac complications during a intermediate risk procedure and may continue as planned. Specific medication recommendations are listed below. Medications recommended to continue should be taken with a sip of water even when NPO.     Further recommendations from consultants: Cardiology        Chronic pancreatitis - per imaging   Obesity  Hx of CVA  Depression/Anxiety/ Bipolar disorder  DMII  CKDIII  HFrEF with recent  exam:->ng placement Reason for Exam: ng FINDINGS: Interval placement of enteric tube.  Side hole is confirmed in the lumen of the stomach with tip extending below field of view of this abdominal radiograph.  No pneumoperitoneum.  Unremarkable bowel gas pattern.  Heart and mediastinum are normal.  The lungs appear clear.  No skeletal finding.     Enteric tube is positioned in the stomach.  Distal tip is below field of view of this exam.     CT ABDOMEN PELVIS WO CONTRAST Additional Contrast? None    Result Date: 7/30/2024  EXAMINATION: CT OF THE ABDOMEN AND PELVIS WITHOUT CONTRAST 7/30/2024 11:06 am TECHNIQUE: CT of the abdomen and pelvis was performed without the administration of intravenous contrast. Multiplanar reformatted images are provided for review. Automated exposure control, iterative reconstruction, and/or weight based adjustment of the mA/kV was utilized to reduce the radiation dose to as low as reasonably achievable. COMPARISON: 04/19/2024 HISTORY: ORDERING SYSTEM PROVIDED HISTORY: abd pain TECHNOLOGIST PROVIDED HISTORY: Reason for exam:->abd pain Additional Contrast?->None Decision Support Exception - unselect if not a suspected or confirmed emergency medical condition->Emergency Medical Condition (MA) Reason for Exam: bleeding from rectum x2-3 days, abd pain FINDINGS: Lower Chest: There is no consolidation or effusion. Organs: The liver, pancreas, spleen, left kidney and adrenals are unremarkable aside from chronic dystrophic pancreatic calcification and a few bubbles of pneumobilia..  The right kidney is surgically absent. GI/Bowel: There is severe distension of the stomach with extensive retained ingested material.  There is moderate fluid-filled/gaseous distention of both large and small bowel throughout the abdomen and pelvis.  There is no obvious bowel wall thickening or Pelvis: The uterus is surgically absent.  The bladder is grossly unremarkable. Peritoneum/Retroperitoneum: There is no free air,

## 2024-08-10 NOTE — PROGRESS NOTES
General Surgery   Daily Progress Note  Patient: Agustina Fried      CC: Duodenal mass    SUBJECTIVE:   NAEO. Tolerating sips of soda without nausea/vomiting.     ROS:   A 14 point review of systems was conducted, significant findings as noted above. All other systems negative.    OBJECTIVE:    PHYSICAL EXAM:    Vitals:    08/09/24 1928 08/10/24 0007 08/10/24 0428 08/10/24 0758   BP: (!) 161/73 (!) 117/55 (!) 137/101 (!) 147/66   Pulse: 63 55 60 70   Resp: 18 18 18 16   Temp: 98.9 °F (37.2 °C) 98.6 °F (37 °C) 98.7 °F (37.1 °C) 97.8 °F (36.6 °C)   TempSrc: Oral Oral Oral Oral   SpO2: 100% 97% 100% 97%   Weight:       Height:           General appearance: Alert, no acute distress  Chest/Lungs: Normal effort with no accessory muscle use on RA  ENT: NG tube in place with gastric contents  Cardiovascular: well perfused  Abdomen: Soft, non-distended, non-tender, no rebound, guarding, or rigidity. Well-healed midline ex lap scar; NGT in place.  Skin: Warm and dry, no rashes  Extremities: No edema, no cyanosis  Neuro: Alert, sensation intact      ASSESSMENT & PLAN:   This is a 64 y.o. female with Hx of HTN, DM, GERD, hyperlipidemia, CKD stage IIIb and surgical history significant for Wilms tumor s/p right nephrectomy (age 8), breast cancer s/p radiation and lumpectomy (09/2014), invasive adenocarcinoma s/p R colectomy (03/2023) who presented with gastric outlet obstruction symptoms due to large ulcerated mass in the second portion of duodenum.     - Continue NGT and monitoring NGT output  - Continue sips with soda  - Continue PICC/TPN  - Will re-obtain consent today   - Plan for OR on Monday  - Remainder of care per primary    Damian Souza DO  PGY2, General Surgery  08/10/24   8:23 AM   PerfectServe  [phone]

## 2024-08-10 NOTE — PROGRESS NOTES
[facility] -  Clinical Pharmacy Note    Parenteral Nutrition - Management by Pharmacy    Consult Date(s): 8/4/24  Consulting Provider(s): Dr Butler    Assessment / Plan  1)  Gastric outlet obstruction 2/2 ulcerated mass, ileus - Parenteral Nutrition  Day of Therapy: 7  Formulation:  Clinimix E 5/20  Clinimix Rate:  70 mL/hr (Total volume = 1680 mL/day) - now at goal  Lipids:  20% 250mL over 12 hours on Mon & Thurs only (due to national shortage)  Appreciate Clinical Dietitian's recommendations for formulation and goal rate.  Electrolytes:  Clinimix-E includes standard electrolyte formulation.  Recommend further repletion with supplemental IVPBs as needed.  Maintenance IVF:  n/a  Glucose control:    Pt has h/o DM.  Takes Lantus 8 units daily + Humalog 4 units TID with meals at home.  Glucoses ranged  since TPN started last evening    Used 4 units high dose SSI.  Will decrease Regular insulin administered to 35 units / 24 hrs in TPN bag for this evening (to account for low glucoses this morning)  Will monitor glucoses and adjust insulin in TPN bag as needed.     Add MVI 10 mL/day in PN on Mon-Wed-Fri only (due to national shortage) & Trace Elements 1 mL/day in PN daily.  Daily renal panel, daily magnesium, and weekly triglycerides ordered per protocol.  PN will be re-ordered daily.    Please call with questions--  Thanks--  Vladimir Knowles, PharmD  PGY1 Pharmacy Resident   Wireless: [phone]  08/10/24       Interval update:  NG remains in place and pt continues on TPN. Will decrease insulin in TPN bag due to low glucose levels this morning. Per notes, planning for OR on Monday.    Subjective/Objective:   Agustina Fried is a 64 y.o. female with a PMHx significant for CAD, HTN, HLD, HFpEF, T2DM, TIA, CKD stage 3, Wilms tumor s/p remote R nephrectomy, anemia, breast cancer, invasive adenocarcinoma s/p R colectomy (3/2023) bipolar dx, depression, anxiety, gout, hx pancreatitis, who is admitted as a transfer from [facility]

## 2024-08-10 NOTE — PROGRESS NOTES
VSS, A&O, pain medication was given with benefit. NGT was hooked back at 2200. Pt ambulated and voided to the bathroom, had very small BM. Fall precaution in place, camera for safety. Call light used for needs.

## 2024-08-10 NOTE — PROGRESS NOTES
Lab called with critical glucose of 40 but when taken on glucometer glucose is 49. MD Ferguson mad aware. MD Jeff with sx also made aware verbally, approved sips of juice for the patient.     Electronically signed by Ashok Brock RN on 8/10/2024 at 7:51 AM

## 2024-08-10 NOTE — PROGRESS NOTES
Ph: [phone], Fax: [phone]           [facility]               Reason for admission:                 Pain abdomen nausea and vomiting    Brief Summary :     Agustina Fried is being seen by nephrology for CKD       Interval History and plan:     Seen in room  Appears to be confused  On oxygen no pedal edema  On TPN  BP is better controlled   Urine is 1500 ml        Continue clonidine patch to 0.3 mg per 24 hours  Replace KCL for hypokalemia   Hold insulin and start D 5 for hypoglycemia   Stable GFR                     Assessment :       CKD Stage IIIa  Creatinine has been variable in the past she has a history of recurrent SHARRON  Last creatinine was 2 upon discharge from 7/2 hospitalization  she has history of diabetes mellitus hypertension    CHF  Echo (7/2/24)    Limited only for LVEF and RVF.    Image quality is adequate.    Left Ventricle: Normal left ventricular systolic function with a visually estimated EF of 55 - 60%. Normal wall motion.    Right ventricle size is normal. Normal systolic function.    Does not appear volume overloaded      Hypertension   BP: (117-147)/()  Pulse:  [55-70]   BP goal inpatient 130-140 systolic inpatient        [facility] would like to thank Charlie Gunn MD   for opportunity to serve this patient      Please call with questions at-   24 Hrs Answering service [phone] or  7 am- 5 pm via Perfect serve or cell phone  Dr.Muhammad Dayne Trimble MD       HPI :     Agustina Fried is a 64 y.o. female presented to   the hospital on 8/2/2024 with  pain abdomen nausea and vomiting.  She is known to have nausea vomiting diarrhea for several days because of which she came to the hospital.  She needed NG tube placement in the emergency room.  Found to have severe gastric distention.  She is known to have CKD and has had multiple hospitalization in the past        PMH/PSH/SH/Family History:     Past Medical History:   Diagnosis Date    Abnormal brain  500 mg in 0.9% NaCl 100 mL IVPB premix, 500 mg, IntraVENous, Q8H  insulin lispro (HUMALOG,ADMELOG) injection vial 0-16 Units, 0-16 Units, SubCUTAneous, 4 times per day  pantoprazole (PROTONIX) 40 mg in sodium chloride (PF) 0.9 % 10 mL injection, 40 mg, IntraVENous, Q12H  sodium chloride flush 0.9 % injection 5-40 mL, 5-40 mL, IntraVENous, 2 times per day  sodium chloride flush 0.9 % injection 5-40 mL, 5-40 mL, IntraVENous, PRN  0.9 % sodium chloride infusion, , IntraVENous, PRN  [Held by provider] enoxaparin (LOVENOX) injection 40 mg, 40 mg, SubCUTAneous, Daily  thiamine (B-1) injection 100 mg, 100 mg, IntraVENous, Daily  diatrizoate meglumine-sodium (GASTROGRAFIN) 66-10 % solution 30 mL, 30 mL, Rectal, ONCE PRN  diatrizoate meglumine-sodium (GASTROGRAFIN) 66-10 % solution 30 mL, 30 mL, Per NG tube, ONCE PRN  HYDROmorphone (DILAUDID) injection 0.25 mg, 0.25 mg, IntraVENous, Q3H PRN **OR** HYDROmorphone (DILAUDID) injection 0.5 mg, 0.5 mg, IntraVENous, Q3H PRN  vancomycin 500 mg in sodium chloride 0.9% 100 mL enema, 500 mg, Rectal, Q6H  sodium chloride flush 0.9 % injection 5-40 mL, 5-40 mL, IntraVENous, 2 times per day  sodium chloride flush 0.9 % injection 5-40 mL, 5-40 mL, IntraVENous, PRN  0.9 % sodium chloride infusion, , IntraVENous, PRN  ondansetron (ZOFRAN-ODT) disintegrating tablet 4 mg, 4 mg, Oral, Q8H PRN **OR** ondansetron (ZOFRAN) injection 4 mg, 4 mg, IntraVENous, Q6H PRN  polyethylene glycol (GLYCOLAX) packet 17 g, 17 g, Oral, Daily PRN  acetaminophen (TYLENOL) tablet 650 mg, 650 mg, Oral, Q6H PRN **OR** acetaminophen (TYLENOL) suppository 650 mg, 650 mg, Rectal, Q6H PRN  glucose chewable tablet 16 g, 4 tablet, Oral, PRN  dextrose bolus 10% 125 mL, 125 mL, IntraVENous, PRN **OR** dextrose bolus 10% 250 mL, 250 mL, IntraVENous, PRN  glucagon injection 1 mg, 1 mg, SubCUTAneous, PRN  dextrose 10 % infusion, , IntraVENous, Continuous PRN    Vitals :     Vitals:    08/10/24 0758   BP: (!) 147/66   Pulse: 70

## 2024-08-11 LAB
ALBUMIN SERPL-MCNC: 2 G/DL (ref 3.4–5)
ANION GAP SERPL CALCULATED.3IONS-SCNC: 7 MMOL/L (ref 3–16)
BUN SERPL-MCNC: 23 MG/DL (ref 7–20)
CALCIUM SERPL-MCNC: 7.5 MG/DL (ref 8.3–10.6)
CHLORIDE SERPL-SCNC: 106 MMOL/L (ref 99–110)
CO2 SERPL-SCNC: 22 MMOL/L (ref 21–32)
CREAT SERPL-MCNC: 1.2 MG/DL (ref 0.6–1.2)
CRP SERPL-MCNC: 4.7 MG/L (ref 0–5.1)
DEPRECATED RDW RBC AUTO: 14.9 % (ref 12.4–15.4)
GFR SERPLBLD CREATININE-BSD FMLA CKD-EPI: 50 ML/MIN/{1.73_M2}
GLUCOSE BLD-MCNC: 225 MG/DL (ref 70–99)
GLUCOSE BLD-MCNC: 238 MG/DL (ref 70–99)
GLUCOSE BLD-MCNC: 255 MG/DL (ref 70–99)
GLUCOSE BLD-MCNC: 334 MG/DL (ref 70–99)
GLUCOSE SERPL-MCNC: 218 MG/DL (ref 70–99)
HCT VFR BLD AUTO: 22.9 % (ref 36–48)
HGB BLD-MCNC: 7.6 G/DL (ref 12–16)
MAGNESIUM SERPL-MCNC: 1.7 MG/DL (ref 1.8–2.4)
MCH RBC QN AUTO: 29.3 PG (ref 26–34)
MCHC RBC AUTO-ENTMCNC: 33 G/DL (ref 31–36)
MCV RBC AUTO: 88.7 FL (ref 80–100)
PERFORMED ON: ABNORMAL
PHOSPHATE SERPL-MCNC: 2.5 MG/DL (ref 2.5–4.9)
PLATELET # BLD AUTO: 138 K/UL (ref 135–450)
PMV BLD AUTO: 9.4 FL (ref 5–10.5)
POTASSIUM SERPL-SCNC: 3.9 MMOL/L (ref 3.5–5.1)
PREALB SERPL-MCNC: 9.5 MG/DL (ref 20–40)
RBC # BLD AUTO: 2.58 M/UL (ref 4–5.2)
SODIUM SERPL-SCNC: 135 MMOL/L (ref 136–145)
TRANSFERRIN SERPL-MCNC: 118 MG/DL (ref 200–360)
TRIGL SERPL-MCNC: 20 MG/DL (ref 0–150)
WBC # BLD AUTO: 7.6 K/UL (ref 4–11)

## 2024-08-11 PROCEDURE — 6360000002 HC RX W HCPCS: Performed by: INTERNAL MEDICINE

## 2024-08-11 PROCEDURE — 84466 ASSAY OF TRANSFERRIN: CPT

## 2024-08-11 PROCEDURE — 99232 SBSQ HOSP IP/OBS MODERATE 35: CPT | Performed by: SURGERY

## 2024-08-11 PROCEDURE — 6370000000 HC RX 637 (ALT 250 FOR IP)

## 2024-08-11 PROCEDURE — 83735 ASSAY OF MAGNESIUM: CPT

## 2024-08-11 PROCEDURE — 84478 ASSAY OF TRIGLYCERIDES: CPT

## 2024-08-11 PROCEDURE — 2580000003 HC RX 258: Performed by: INTERNAL MEDICINE

## 2024-08-11 PROCEDURE — 84134 ASSAY OF PREALBUMIN: CPT

## 2024-08-11 PROCEDURE — 36592 COLLECT BLOOD FROM PICC: CPT

## 2024-08-11 PROCEDURE — 85027 COMPLETE CBC AUTOMATED: CPT

## 2024-08-11 PROCEDURE — 2500000003 HC RX 250 WO HCPCS

## 2024-08-11 PROCEDURE — 1200000000 HC SEMI PRIVATE

## 2024-08-11 PROCEDURE — 86140 C-REACTIVE PROTEIN: CPT

## 2024-08-11 PROCEDURE — 80069 RENAL FUNCTION PANEL: CPT

## 2024-08-11 RX ORDER — MAGNESIUM SULFATE IN WATER 40 MG/ML
2000 INJECTION, SOLUTION INTRAVENOUS ONCE
Status: COMPLETED | OUTPATIENT
Start: 2024-08-11 | End: 2024-08-11

## 2024-08-11 RX ORDER — CIPROFLOXACIN 2 MG/ML
400 INJECTION, SOLUTION INTRAVENOUS
Status: COMPLETED | OUTPATIENT
Start: 2024-08-12 | End: 2024-08-12

## 2024-08-11 RX ADMIN — SODIUM CHLORIDE, PRESERVATIVE FREE 40 MG: 5 INJECTION INTRAVENOUS at 20:06

## 2024-08-11 RX ADMIN — VANCOMYCIN HYDROCHLORIDE 500 MG: 1 INJECTION, POWDER, LYOPHILIZED, FOR SOLUTION INTRAVENOUS at 17:49

## 2024-08-11 RX ADMIN — METRONIDAZOLE 500 MG: 500 INJECTION, SOLUTION INTRAVENOUS at 12:18

## 2024-08-11 RX ADMIN — METRONIDAZOLE 500 MG: 500 INJECTION, SOLUTION INTRAVENOUS at 20:06

## 2024-08-11 RX ADMIN — VANCOMYCIN HYDROCHLORIDE 500 MG: 1 INJECTION, POWDER, LYOPHILIZED, FOR SOLUTION INTRAVENOUS at 12:16

## 2024-08-11 RX ADMIN — THIAMINE HYDROCHLORIDE 100 MG: 100 INJECTION, SOLUTION INTRAMUSCULAR; INTRAVENOUS at 09:19

## 2024-08-11 RX ADMIN — HYDROMORPHONE HYDROCHLORIDE 0.5 MG: 1 INJECTION, SOLUTION INTRAMUSCULAR; INTRAVENOUS; SUBCUTANEOUS at 09:19

## 2024-08-11 RX ADMIN — TRACE ELEMENTS INJECTION 4: 7.4; .75; 98; 151 INJECTION, SOLUTION INTRAVENOUS at 17:46

## 2024-08-11 RX ADMIN — SODIUM CHLORIDE, PRESERVATIVE FREE 40 MG: 5 INJECTION INTRAVENOUS at 09:18

## 2024-08-11 RX ADMIN — HYDROMORPHONE HYDROCHLORIDE 0.5 MG: 1 INJECTION, SOLUTION INTRAMUSCULAR; INTRAVENOUS; SUBCUTANEOUS at 14:21

## 2024-08-11 RX ADMIN — VANCOMYCIN HYDROCHLORIDE 500 MG: 1 INJECTION, POWDER, LYOPHILIZED, FOR SOLUTION INTRAVENOUS at 05:25

## 2024-08-11 RX ADMIN — MAGNESIUM SULFATE HEPTAHYDRATE 2000 MG: 40 INJECTION, SOLUTION INTRAVENOUS at 09:28

## 2024-08-11 RX ADMIN — HYDROMORPHONE HYDROCHLORIDE 0.5 MG: 1 INJECTION, SOLUTION INTRAMUSCULAR; INTRAVENOUS; SUBCUTANEOUS at 17:39

## 2024-08-11 ASSESSMENT — PAIN DESCRIPTION - ORIENTATION
ORIENTATION: RIGHT;LEFT

## 2024-08-11 ASSESSMENT — PAIN - FUNCTIONAL ASSESSMENT
PAIN_FUNCTIONAL_ASSESSMENT: ACTIVITIES ARE NOT PREVENTED

## 2024-08-11 ASSESSMENT — PAIN SCALES - GENERAL
PAINLEVEL_OUTOF10: 7
PAINLEVEL_OUTOF10: 4
PAINLEVEL_OUTOF10: 8
PAINLEVEL_OUTOF10: 7

## 2024-08-11 ASSESSMENT — PAIN DESCRIPTION - DESCRIPTORS
DESCRIPTORS: CRAMPING
DESCRIPTORS: ACHING;DISCOMFORT
DESCRIPTORS: ACHING;DISCOMFORT;SORE

## 2024-08-11 ASSESSMENT — PAIN DESCRIPTION - LOCATION
LOCATION: ABDOMEN
LOCATION: FACE;ABDOMEN
LOCATION: ABDOMEN

## 2024-08-11 NOTE — PROGRESS NOTES
Pt up to toilet, had large BM. Pt ambulated down figueredo and back, now up in chair. Pt had c/o pain, PRN pain meds given per orders.    Electronically signed by Ashok Brock RN on 8/11/2024 at 11:52 AM

## 2024-08-11 NOTE — PROGRESS NOTES
[facility] -  Clinical Pharmacy Note    Parenteral Nutrition - Management by Pharmacy    Consult Date(s): 8/4/24  Consulting Provider(s): Dr Butler    Assessment / Plan  1)  Gastric outlet obstruction 2/2 ulcerated mass, ileus - Parenteral Nutrition  Day of Therapy: 8  Formulation:  Clinimix E 5/20  Clinimix Rate:  70 mL/hr (Total volume = 1680 mL/day) - now at goal  Lipids:  20% 250mL over 12 hours on Mon & Thurs only (due to national shortage)  Appreciate Clinical Dietitian's recommendations for formulation and goal rate.  Electrolytes:  Clinimix-E includes standard electrolyte formulation.  Recommend further repletion with supplemental IVPBs as needed.  Maintenance IVF:  n/a  Glucose control:    Pt has h/o DM.  Takes Lantus 8 units daily + Humalog 4 units TID with meals at home.  Glucoses ranged 218-255 since TPN started last evening    Used 0 units high dose SSI. (Held by provider)  Will increase Regular insulin administered to 40 units / 24 hrs in TPN bag for this evening (to account for increase glucose levels this morning)  Will monitor glucoses and adjust insulin in TPN bag as needed.     Add MVI 10 mL/day in PN on Mon-Wed-Fri only (due to national shortage) & Trace Elements 1 mL/day in PN daily.  Daily renal panel, daily magnesium, and weekly triglycerides ordered per protocol.  PN will be re-ordered daily.    Please call with questions--  Thanks--  Vladimir Knowles, PharmD  PGY1 Pharmacy Resident   Wireless: [phone]  08/11/24       Interval update:  NG remains in place and pt continues on TPN. Will increase insulin in TPN bag due to high glucose levels this morning. Per notes, planning for OR on Monday.    Subjective/Objective:   Agustina Fried is a 64 y.o. female with a PMHx significant for CAD, HTN, HLD, HFpEF, T2DM, TIA, CKD stage 3, Wilms tumor s/p remote R nephrectomy, anemia, breast cancer, invasive adenocarcinoma s/p R colectomy (3/2023) bipolar dx, depression, anxiety, gout, hx pancreatitis, who is  admitted as a transfer from [facility] with gastric outlet obstruction 2/2 large ulcerated mass in duodenum.  PICC placed and TPN started 8/4/24.    Pharmacy is consulted to manage parenteral nutrition.    Ht Readings from Last 1 Encounters:   08/02/24 1.6 m (5' 2.99\")     Wt Readings from Last 1 Encounters:   08/09/24 77.6 kg (171 lb)     Recent Labs     08/10/24  0625 08/10/24  1641 08/11/24  0548    137 135*   K 3.0* 4.0 3.9   * 109 106   CO2 24 20* 22   PHOS 2.6 2.7 2.5   GLUCOSE 40* 108* 218*   BUN 24* 25* 23*   CREATININE 1.2 1.2 1.2   CALCIUM 7.3* 7.4* 7.5*   MG 1.60*  --  1.70*     Albumin   Date Value Ref Range Status   08/11/2024 2.0 (L) 3.4 - 5.0 g/dL Final     Corrected Calcium: 9.1 mg/dL    Recent Labs     08/10/24  1209 08/10/24  1544 08/11/24  0537 08/11/24  0853   POCGLU 99 146* 238* 255*     Sliding scale insulin used since PN bag hung yesterday: 0 units (held by provider)    Recent Labs     08/10/24  0625 08/11/24  0547   WBC 8.0 7.6   HGB 7.9* 7.6*    138     Triglycerides   Date Value Ref Range Status   08/06/2024 88 0 - 150 mg/dL Final   08/05/2024 91 0 - 150 mg/dL Final   08/04/2024 70 0 - 150 mg/dL Final   11/27/2023 89 0 - 150 mg/dL Final   09/21/2023 90 0 - 150 mg/dL Final

## 2024-08-11 NOTE — PROGRESS NOTES
Pt ambulated in halls multiple times this shift. Pt had c/o pain, PRN pain meds given per orders. No needs at this time.    Electronically signed by Ashok Brock RN on 8/11/2024 at 7:09 PM

## 2024-08-11 NOTE — PROGRESS NOTES
Pt blood sugar 334, MD Katey-Marker and pt night shift RN made aware.    Electronically signed by Ashok Brock RN on 8/11/2024 at 7:27 PM

## 2024-08-11 NOTE — PROGRESS NOTES
Family was spoken to at bedside and their concerns were addressed regarding the type of surgery that will be performed and its risks and benefits.    Cindy Owusu MD  PGY1, General Surgery  08/11/24  4:52 PM  [phone]

## 2024-08-11 NOTE — PROGRESS NOTES
Pt blood sugar 255, MD Gunn made aware at bedside, no new orders.     Electronically signed by Ashok Brock RN on 8/11/2024 at 9:31 AM

## 2024-08-11 NOTE — PROGRESS NOTES
Ph: [phone], Fax: [phone]           [facility]               Reason for admission:                 Pain abdomen nausea and vomiting    Brief Summary :     Agustina Fried is being seen by nephrology for CKD       Interval History and plan:     Seen in room  BP is  elevated but better    Urine is 400 ml ++  Creatinine is 1.2        Continue clonidine patch to 0.3 mg per 24 hours  Replace magnesium IV   IVF stopped as blood glucose improved  Stable GFR                     Assessment :       CKD Stage IIIa  Creatinine has been variable in the past she has a history of recurrent SHARRON  Last creatinine was 2 upon discharge from 7/2 hospitalization  she has history of diabetes mellitus hypertension    CHF  Echo (7/2/24)    Limited only for LVEF and RVF.    Image quality is adequate.    Left Ventricle: Normal left ventricular systolic function with a visually estimated EF of 55 - 60%. Normal wall motion.    Right ventricle size is normal. Normal systolic function.    Does not appear volume overloaded      Hypertension   BP: (158-165)/(70-85)  Pulse:  [56-64]   BP goal inpatient 130-140 systolic inpatient        [facility] would like to thank Charlie Gunn MD   for opportunity to serve this patient      Please call with questions at-   24 Hrs Answering service [phone] or  7 am- 5 pm via Perfect serve or cell phone  Dr.Muhammad Dayne Trimble MD       HPI :     Agustina Fried is a 64 y.o. female presented to   the hospital on 8/2/2024 with  pain abdomen nausea and vomiting.  She is known to have nausea vomiting diarrhea for several days because of which she came to the hospital.  She needed NG tube placement in the emergency room.  Found to have severe gastric distention.  She is known to have CKD and has had multiple hospitalization in the past        PMH/PSH/SH/Family History:     Past Medical History:   Diagnosis Date    Abnormal brain MRI 7/20/2017    Partially empty sella and    BP: (!) 158/70   Pulse: 64   Resp:    Temp: 98.1 °F (36.7 °C)   SpO2: 99%          Physical Examination :     Appearance: Alert, awake. NAD.   Respiratory:  No RD on room air.   Cardiovascular: Edema none  Abdomen:  soft  Other relevant findings: NG tube placed.    Labs :     CBC:   Recent Labs     08/09/24  0655 08/10/24  0625 08/11/24  0547   WBC 7.7 8.0 7.6   HGB 8.0* 7.9* 7.6*   HCT 24.3* 24.0* 22.9*   * 149 138     BMP:    Recent Labs     08/09/24  0655 08/09/24  1910 08/10/24  0625 08/10/24  1641 08/11/24  0548      < > 143 137 135*   K 3.3*   < > 3.0* 4.0 3.9      < > 111* 109 106   CO2 22   < > 24 20* 22   BUN 20   < > 24* 25* 23*   CREATININE 1.2   < > 1.2 1.2 1.2   GLUCOSE 152*   < > 40* 108* 218*   MG 1.60*  --  1.60*  --  1.70*   PHOS 2.8  2.7   < > 2.6 2.7 2.5    < > = values in this interval not displayed.     Lab Results   Component Value Date/Time    COLORU Yellow 07/31/2024 04:34 AM    NITRU Negative 07/31/2024 04:34 AM    GLUCOSEU Negative 07/31/2024 04:34 AM    GLUCOSEU >=1000 mg/dL 06/07/2010 03:38 PM    KETUA Negative 07/31/2024 04:34 AM    UROBILINOGEN 0.2 07/31/2024 04:34 AM    BILIRUBINUR Negative 07/31/2024 04:34 AM        ----------------------------------------------------------  Please call with questions at      24 Hrs Answering service [phone]  Perfect serve, or cell phone 7 am - 5pm  Glen Plascencia MD   [facility]

## 2024-08-11 NOTE — PROGRESS NOTES
General Surgery   Daily Progress Note  Patient: Agustina Fried      CC: Duodenal mass    SUBJECTIVE:   NAEO. Resting comfortably     ROS:   A 14 point review of systems was conducted, significant findings as noted above. All other systems negative.    OBJECTIVE:    PHYSICAL EXAM:    Vitals:    08/10/24 2000 08/10/24 2223 08/11/24 0348 08/11/24 0716   BP:  (!) 148/74 (!) 165/85 (!) 158/70   Pulse:  67 56 64   Resp:  16 17    Temp:  98.3 °F (36.8 °C) 98.3 °F (36.8 °C) 98.1 °F (36.7 °C)   TempSrc: (P) Temporal Oral Oral Oral   SpO2:  98% 100% 99%   Weight:       Height:           General appearance: Alert, no acute distress  Chest/Lungs: Normal effort with no accessory muscle use on RA  ENT: NG tube in place with gastric contents  Cardiovascular: well perfused  Abdomen: Soft, non-distended, non-tender, no rebound, guarding, or rigidity. Well-healed midline ex lap scar; NGT in place.  Skin: Warm and dry, no rashes  Extremities: No edema, no cyanosis  Neuro: Alert, sensation intact      ASSESSMENT & PLAN:   This is a 64 y.o. female with Hx of HTN, DM, GERD, hyperlipidemia, CKD stage IIIb and surgical history significant for Wilms tumor s/p right nephrectomy (age 8), breast cancer s/p radiation and lumpectomy (09/2014), invasive adenocarcinoma s/p R colectomy (03/2023) who presented with gastric outlet obstruction symptoms due to large ulcerated mass in the second portion of duodenum.     - Continue NGT and monitoring NGT output  - Continue sips with soda  - Continue PICC/TPN  - Plan for OR on Monday. Pre-op today. Consent obtained.   - Remainder of care per primary    Damian Souza DO  PGY2, General Surgery  08/11/24   8:56 AM   PerfectServe  [phone]

## 2024-08-11 NOTE — PROGRESS NOTES
V2.0    [facility] – [place], [place] Progress Note      Name:  Agustina Fried /Age/Sex: 1960  (64 y.o. female)   MRN & CSN:  [id number] & [id number] Encounter Date/Time: 2024 11:32 AM EDT   Location:  5319/5319-01 PCP: Viridiana Blanco, APRN - NP     Attending:Charlie Gunn MD       Hospital Day: 10    Assessment and Recommendations   Agustina Fried is a 64 y.o. 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      It does not look like she followed after her EGD results and she did not follow with oncology since . She was also recommended to have an EUS for evaluation of elevated alk phos and double ductal sign on imaging, but she has failed to follow-up numerous times      Pt was admitted - with acute CHF exacerbation and ESBL Ecoli UTI treated with meropenem. She presented to the ED  with  nausea, vomiting, diarrhea , abd pain and rectal bleeding. CT showed ileus with severe gastric distension. She was seen by general surgery and had NG. She was admitted to [facility]. Hb dropped from 8.1 to 6.9. She was seen by GI who did an EGD that showed duodenal malignancy resulting in obstruction . CT chest showed Patchy ground-glass opacity within the left lower lobe with superimposed  micronodularity; correlate for pneumonitis.  Metastatic disease cannot be entirely excluded. Chronic heterogeneous attenuation of thoracic and cervical vertebral bodies, as well as sternum, which could reflect metabolic bone disease or chronic metastatic disease.     Pt and family decided on  aggressive care. Pt was transferred here for surgery-oncology input. She was also started on meropenem for possible UTI again     Duodenal adenocarcinoma   SBO 2/2 above  Cognitive communication deficit   -Cont NG and TPN   -IV fluids stopped as she started to have some hypervolemia. Given alsix 20 x 1  -CT abd 8/4 showed :Interval development of moderate colitis along the left hemicolon . She did test positive for C. difficile . It also reported the duodenal mass is not well evaluated with an ill-defined 6 cm area  of heterogeneous necrotic mass or abscess along the second portion of the duodenum with moderate duodenal luminal narrowing. This abuts the hepatic flexure of the colon and may represent sequelae of malignancy or infection with possible fistula or abscess formation, not well evaluated.   -EGD 8/5 showed : duodenum had external compression at the apex of the bulb; there is narrowing through the duodenal sweep and then a large mass which is completely obstructing that was not bypassed by the scope. It also showed several gastric ulcers near the edge of the NG tube consistent with suction marks. and LA grade D erosive esophagitis. Stent could not be placed   -Colonoscopy 8/5 showed a bleeding mass. The mucosa around these lesion were well preserved suggesting external growth into the colon rather than vice versa    -Pathology showed ulcerated intestinal tissue with at least high-grade   dysplasia\adenocarcinoma in situ. Associated inflamed ulceration bed material with focal necrosis.   -Surgery, oncology , GI and palliative care on board .  -Per oncology she is not candidate for any oncology directed treatment due to her poor performance status   -Discussed with surgery - plan to go to OR on Monday   -Blood sugars are improving mentation is better  -Continue NG tube decompression with PICC/TPN    Cdiff infection -Cont IV flagyl and rectal vancomycin for now . When possible will change to oral Dificid  exacerbation  CAD  Chronic pain   Carotid stenosis  Hx of Cdiff infection  Hx of renal cell carcinoma/Wilms tumor   Hx of breast cancer  Hx of colon cancer diagnosed by colonoscopy s/p R colectomy (03/23)   -Oral meds on hold.       Medical Decision Making:  The following items were considered in medical decision making:  Discussion of patient care with other providers  Reviewed clinical lab tests if any  Reviewed radiology tests if any  Reviewed other diagnostic tests/interventions  Independent review of radiologic images if any  Microbiology cultures and other micro tests if any        Subjective:     The patient was seen at the bedside mentation is improving blood sugars better.  Likely plan for surgery tomorrow patient remembers me from previous admission    Review of Systems:      Pertinent positives and negatives discussed in HPI    Objective:     Intake/Output Summary (Last 24 hours) at 8/11/2024 1226  Last data filed at 8/11/2024 0209  Gross per 24 hour   Intake 240 ml   Output 1250 ml   Net -1010 ml      Vitals:   Vitals:    08/11/24 0348 08/11/24 0716 08/11/24 0949 08/11/24 1221   BP: (!) 165/85 (!) 158/70  (!) 143/66   Pulse: 56 64  51   Resp: 17  15    Temp: 98.3 °F (36.8 °C) 98.1 °F (36.7 °C)  98 °F (36.7 °C)   TempSrc: Oral Oral  Oral   SpO2: 100% 99%  100%   Weight:       Height:             Physical Exam:      General: . Chronically ill   Eyes: EOMI  ENT: neck supple. NG  Cardiovascular: Regular rate.  Respiratory: Clear to auscultation  Gastrointestinal: Soft, non tender  Genitourinary: no suprapubic tenderness  Musculoskeletal: No edema  Skin: warm, dry  Neuro: Alert.  Psych: Mood appropriate.         Medications:   Medications:    cloNIDine  1 patch TransDERmal Weekly    metroNIDAZOLE  500 mg IntraVENous Q8H    [Held by provider] insulin lispro  0-16 Units SubCUTAneous 4 times per day    pantoprazole (PROTONIX) 40 mg in sodium chloride (PF) 0.9 % 10 mL injection  40 mg IntraVENous Q12H    sodium

## 2024-08-11 NOTE — PROGRESS NOTES
Assumed care at 2220. VSS, A&O, IV abx infused, TPN infusing. NGT hooked overnight. Pt voided and ambulated to the bathroom. Fall precaution in place, call light was used for needs.

## 2024-08-12 ENCOUNTER — ANESTHESIA (OUTPATIENT)
Dept: OPERATING ROOM | Age: 64
End: 2024-08-12
Payer: MEDICAID

## 2024-08-12 LAB
ABO + RH BLD: NORMAL
ALBUMIN SERPL-MCNC: 1.8 G/DL (ref 3.4–5)
ALBUMIN SERPL-MCNC: 2 G/DL (ref 3.4–5)
ALBUMIN SERPL-MCNC: 2.1 G/DL (ref 3.4–5)
ALP SERPL-CCNC: 134 U/L (ref 40–129)
ALT SERPL-CCNC: <5 U/L (ref 10–40)
ANION GAP SERPL CALCULATED.3IONS-SCNC: 6 MMOL/L (ref 3–16)
ANION GAP SERPL CALCULATED.3IONS-SCNC: 9 MMOL/L (ref 3–16)
AST SERPL-CCNC: 12 U/L (ref 15–37)
BASOPHILS # BLD: 0.1 K/UL (ref 0–0.2)
BASOPHILS NFR BLD: 1.1 %
BILIRUB DIRECT SERPL-MCNC: <0.2 MG/DL (ref 0–0.3)
BILIRUB INDIRECT SERPL-MCNC: ABNORMAL MG/DL (ref 0–1)
BILIRUB SERPL-MCNC: <0.2 MG/DL (ref 0–1)
BLD GP AB SCN SERPL QL: NORMAL
BLOOD BANK DISPENSE STATUS: NORMAL
BLOOD BANK DISPENSE STATUS: NORMAL
BLOOD BANK PRODUCT CODE: NORMAL
BLOOD BANK PRODUCT CODE: NORMAL
BPU ID: NORMAL
BPU ID: NORMAL
BUN SERPL-MCNC: 25 MG/DL (ref 7–20)
BUN SERPL-MCNC: 27 MG/DL (ref 7–20)
CALCIUM SERPL-MCNC: 7.5 MG/DL (ref 8.3–10.6)
CALCIUM SERPL-MCNC: 7.6 MG/DL (ref 8.3–10.6)
CHLORIDE SERPL-SCNC: 103 MMOL/L (ref 99–110)
CHLORIDE SERPL-SCNC: 107 MMOL/L (ref 99–110)
CO2 SERPL-SCNC: 22 MMOL/L (ref 21–32)
CO2 SERPL-SCNC: 22 MMOL/L (ref 21–32)
CREAT SERPL-MCNC: 1.2 MG/DL (ref 0.6–1.2)
CREAT SERPL-MCNC: 1.2 MG/DL (ref 0.6–1.2)
DEPRECATED RDW RBC AUTO: 14.4 % (ref 12.4–15.4)
DESCRIPTION BLOOD BANK: NORMAL
DESCRIPTION BLOOD BANK: NORMAL
EOSINOPHIL # BLD: 0.1 K/UL (ref 0–0.6)
EOSINOPHIL NFR BLD: 1.7 %
GFR SERPLBLD CREATININE-BSD FMLA CKD-EPI: 50 ML/MIN/{1.73_M2}
GFR SERPLBLD CREATININE-BSD FMLA CKD-EPI: 50 ML/MIN/{1.73_M2}
GLUCOSE BLD-MCNC: 116 MG/DL (ref 70–99)
GLUCOSE BLD-MCNC: 166 MG/DL (ref 70–99)
GLUCOSE BLD-MCNC: 169 MG/DL (ref 70–99)
GLUCOSE BLD-MCNC: 235 MG/DL (ref 70–99)
GLUCOSE SERPL-MCNC: 178 MG/DL (ref 70–99)
GLUCOSE SERPL-MCNC: ABNORMAL MG/DL (ref 70–99)
HCT VFR BLD AUTO: 22.1 % (ref 36–48)
HGB BLD-MCNC: 7.1 G/DL (ref 12–16)
INR PPP: 1.3 (ref 0.85–1.15)
LYMPHOCYTES # BLD: 1.3 K/UL (ref 1–5.1)
LYMPHOCYTES NFR BLD: 20.7 %
MAGNESIUM SERPL-MCNC: 2.1 MG/DL (ref 1.8–2.4)
MCH RBC QN AUTO: 28.7 PG (ref 26–34)
MCHC RBC AUTO-ENTMCNC: 32.1 G/DL (ref 31–36)
MCV RBC AUTO: 89.4 FL (ref 80–100)
MONOCYTES # BLD: 0.7 K/UL (ref 0–1.3)
MONOCYTES NFR BLD: 10.3 %
NEUTROPHILS # BLD: 4.3 K/UL (ref 1.7–7.7)
NEUTROPHILS NFR BLD: 66.2 %
PERFORMED ON: ABNORMAL
PHOSPHATE SERPL-MCNC: 3 MG/DL (ref 2.5–4.9)
PHOSPHATE SERPL-MCNC: 6.4 MG/DL (ref 2.5–4.9)
PLATELET # BLD AUTO: 137 K/UL (ref 135–450)
PMV BLD AUTO: 9.6 FL (ref 5–10.5)
POTASSIUM SERPL-SCNC: 3.7 MMOL/L (ref 3.5–5.1)
POTASSIUM SERPL-SCNC: ABNORMAL MMOL/L (ref 3.5–5.1)
PROT SERPL-MCNC: 6.1 G/DL (ref 6.4–8.2)
PROTHROMBIN TIME: 16.4 SEC (ref 11.9–14.9)
RBC # BLD AUTO: 2.47 M/UL (ref 4–5.2)
SODIUM SERPL-SCNC: 131 MMOL/L (ref 136–145)
SODIUM SERPL-SCNC: 138 MMOL/L (ref 136–145)
WBC # BLD AUTO: 6.5 K/UL (ref 4–11)

## 2024-08-12 PROCEDURE — 6360000002 HC RX W HCPCS: Performed by: INTERNAL MEDICINE

## 2024-08-12 PROCEDURE — 86850 RBC ANTIBODY SCREEN: CPT

## 2024-08-12 PROCEDURE — 3700000001 HC ADD 15 MINUTES (ANESTHESIA): Performed by: SURGERY

## 2024-08-12 PROCEDURE — 2580000003 HC RX 258: Performed by: NURSE ANESTHETIST, CERTIFIED REGISTERED

## 2024-08-12 PROCEDURE — 6360000002 HC RX W HCPCS

## 2024-08-12 PROCEDURE — 2500000003 HC RX 250 WO HCPCS: Performed by: NURSE ANESTHETIST, CERTIFIED REGISTERED

## 2024-08-12 PROCEDURE — 2580000003 HC RX 258: Performed by: SURGERY

## 2024-08-12 PROCEDURE — 2580000003 HC RX 258: Performed by: INTERNAL MEDICINE

## 2024-08-12 PROCEDURE — 88307 TISSUE EXAM BY PATHOLOGIST: CPT

## 2024-08-12 PROCEDURE — 85025 COMPLETE CBC W/AUTO DIFF WBC: CPT

## 2024-08-12 PROCEDURE — 85610 PROTHROMBIN TIME: CPT

## 2024-08-12 PROCEDURE — 83735 ASSAY OF MAGNESIUM: CPT

## 2024-08-12 PROCEDURE — P9016 RBC LEUKOCYTES REDUCED: HCPCS

## 2024-08-12 PROCEDURE — 0FB00ZZ EXCISION OF LIVER, OPEN APPROACH: ICD-10-PCS | Performed by: SURGERY

## 2024-08-12 PROCEDURE — 47100 WEDGE BIOPSY OF LIVER: CPT | Performed by: SURGERY

## 2024-08-12 PROCEDURE — 7100000000 HC PACU RECOVERY - FIRST 15 MIN: Performed by: SURGERY

## 2024-08-12 PROCEDURE — 6370000000 HC RX 637 (ALT 250 FOR IP)

## 2024-08-12 PROCEDURE — 3700000000 HC ANESTHESIA ATTENDED CARE: Performed by: SURGERY

## 2024-08-12 PROCEDURE — 86901 BLOOD TYPING SEROLOGIC RH(D): CPT

## 2024-08-12 PROCEDURE — 43820 GASTROJEJUNOSTOMY WO VAGOTMY: CPT | Performed by: SURGERY

## 2024-08-12 PROCEDURE — 6360000002 HC RX W HCPCS: Performed by: SURGERY

## 2024-08-12 PROCEDURE — 80069 RENAL FUNCTION PANEL: CPT

## 2024-08-12 PROCEDURE — 0DHA0UZ INSERTION OF FEEDING DEVICE INTO JEJUNUM, OPEN APPROACH: ICD-10-PCS | Performed by: SURGERY

## 2024-08-12 PROCEDURE — 7100000001 HC PACU RECOVERY - ADDTL 15 MIN: Performed by: SURGERY

## 2024-08-12 PROCEDURE — A4217 STERILE WATER/SALINE, 500 ML: HCPCS | Performed by: SURGERY

## 2024-08-12 PROCEDURE — 2720000010 HC SURG SUPPLY STERILE: Performed by: SURGERY

## 2024-08-12 PROCEDURE — 3600000014 HC SURGERY LEVEL 4 ADDTL 15MIN: Performed by: SURGERY

## 2024-08-12 PROCEDURE — 0WBH0ZZ EXCISION OF RETROPERITONEUM, OPEN APPROACH: ICD-10-PCS | Performed by: SURGERY

## 2024-08-12 PROCEDURE — 86923 COMPATIBILITY TEST ELECTRIC: CPT

## 2024-08-12 PROCEDURE — 43830 GSTRST OPEN WO CONSTJ TUBE: CPT | Performed by: SURGERY

## 2024-08-12 PROCEDURE — 3E0H76Z INTRODUCTION OF NUTRITIONAL SUBSTANCE INTO LOWER GI, VIA NATURAL OR ARTIFICIAL OPENING: ICD-10-PCS | Performed by: SURGERY

## 2024-08-12 PROCEDURE — 6360000002 HC RX W HCPCS: Performed by: STUDENT IN AN ORGANIZED HEALTH CARE EDUCATION/TRAINING PROGRAM

## 2024-08-12 PROCEDURE — 86900 BLOOD TYPING SEROLOGIC ABO: CPT

## 2024-08-12 PROCEDURE — 2580000003 HC RX 258

## 2024-08-12 PROCEDURE — 2709999900 HC NON-CHARGEABLE SUPPLY: Performed by: SURGERY

## 2024-08-12 PROCEDURE — 88305 TISSUE EXAM BY PATHOLOGIST: CPT

## 2024-08-12 PROCEDURE — 1200000000 HC SEMI PRIVATE

## 2024-08-12 PROCEDURE — 44130 BOWEL TO BOWEL FUSION: CPT | Performed by: SURGERY

## 2024-08-12 PROCEDURE — 6360000002 HC RX W HCPCS: Performed by: NURSE ANESTHETIST, CERTIFIED REGISTERED

## 2024-08-12 PROCEDURE — 30233N1 TRANSFUSION OF NONAUTOLOGOUS RED BLOOD CELLS INTO PERIPHERAL VEIN, PERCUTANEOUS APPROACH: ICD-10-PCS | Performed by: INTERNAL MEDICINE

## 2024-08-12 PROCEDURE — 97530 THERAPEUTIC ACTIVITIES: CPT

## 2024-08-12 PROCEDURE — 0DN80ZZ RELEASE SMALL INTESTINE, OPEN APPROACH: ICD-10-PCS | Performed by: SURGERY

## 2024-08-12 PROCEDURE — 80076 HEPATIC FUNCTION PANEL: CPT

## 2024-08-12 PROCEDURE — 2500000003 HC RX 250 WO HCPCS

## 2024-08-12 PROCEDURE — 3600000004 HC SURGERY LEVEL 4 BASE: Performed by: SURGERY

## 2024-08-12 PROCEDURE — 0DBU0ZZ EXCISION OF OMENTUM, OPEN APPROACH: ICD-10-PCS | Performed by: SURGERY

## 2024-08-12 PROCEDURE — 0D160ZA BYPASS STOMACH TO JEJUNUM, OPEN APPROACH: ICD-10-PCS | Performed by: SURGERY

## 2024-08-12 PROCEDURE — 36415 COLL VENOUS BLD VENIPUNCTURE: CPT

## 2024-08-12 DEVICE — IMPLANTABLE DEVICE: Type: IMPLANTABLE DEVICE | Status: FUNCTIONAL

## 2024-08-12 RX ORDER — ROCURONIUM BROMIDE 10 MG/ML
INJECTION, SOLUTION INTRAVENOUS PRN
Status: DISCONTINUED | OUTPATIENT
Start: 2024-08-12 | End: 2024-08-12 | Stop reason: SDUPTHER

## 2024-08-12 RX ORDER — METOCLOPRAMIDE HYDROCHLORIDE 5 MG/ML
INJECTION INTRAMUSCULAR; INTRAVENOUS PRN
Status: DISCONTINUED | OUTPATIENT
Start: 2024-08-12 | End: 2024-08-12 | Stop reason: SDUPTHER

## 2024-08-12 RX ORDER — SODIUM CHLORIDE 9 MG/ML
INJECTION, SOLUTION INTRAVENOUS PRN
Status: DISCONTINUED | OUTPATIENT
Start: 2024-08-12 | End: 2024-08-22 | Stop reason: HOSPADM

## 2024-08-12 RX ORDER — SUCCINYLCHOLINE/SOD CL,ISO/PF 100 MG/5ML
SYRINGE (ML) INTRAVENOUS PRN
Status: DISCONTINUED | OUTPATIENT
Start: 2024-08-12 | End: 2024-08-12 | Stop reason: SDUPTHER

## 2024-08-12 RX ORDER — PROCHLORPERAZINE EDISYLATE 5 MG/ML
5 INJECTION INTRAMUSCULAR; INTRAVENOUS
Status: DISCONTINUED | OUTPATIENT
Start: 2024-08-12 | End: 2024-08-12 | Stop reason: HOSPADM

## 2024-08-12 RX ORDER — FENTANYL CITRATE 50 UG/ML
25 INJECTION, SOLUTION INTRAMUSCULAR; INTRAVENOUS EVERY 5 MIN PRN
Status: DISCONTINUED | OUTPATIENT
Start: 2024-08-12 | End: 2024-08-12 | Stop reason: HOSPADM

## 2024-08-12 RX ORDER — SODIUM CHLORIDE 0.9 % (FLUSH) 0.9 %
5-40 SYRINGE (ML) INJECTION PRN
Status: DISCONTINUED | OUTPATIENT
Start: 2024-08-12 | End: 2024-08-12 | Stop reason: HOSPADM

## 2024-08-12 RX ORDER — HYDRALAZINE HYDROCHLORIDE 20 MG/ML
10 INJECTION INTRAMUSCULAR; INTRAVENOUS
Status: DISCONTINUED | OUTPATIENT
Start: 2024-08-12 | End: 2024-08-12 | Stop reason: HOSPADM

## 2024-08-12 RX ORDER — MAGNESIUM HYDROXIDE 1200 MG/15ML
LIQUID ORAL CONTINUOUS PRN
Status: DISCONTINUED | OUTPATIENT
Start: 2024-08-12 | End: 2024-08-12 | Stop reason: HOSPADM

## 2024-08-12 RX ORDER — HALOPERIDOL 5 MG/ML
1 INJECTION INTRAMUSCULAR
Status: DISCONTINUED | OUTPATIENT
Start: 2024-08-12 | End: 2024-08-12 | Stop reason: HOSPADM

## 2024-08-12 RX ORDER — PROPOFOL 10 MG/ML
INJECTION, EMULSION INTRAVENOUS PRN
Status: DISCONTINUED | OUTPATIENT
Start: 2024-08-12 | End: 2024-08-12 | Stop reason: SDUPTHER

## 2024-08-12 RX ORDER — SODIUM CHLORIDE 9 MG/ML
INJECTION, SOLUTION INTRAVENOUS PRN
Status: DISCONTINUED | OUTPATIENT
Start: 2024-08-12 | End: 2024-08-12 | Stop reason: HOSPADM

## 2024-08-12 RX ORDER — FENTANYL CITRATE 50 UG/ML
INJECTION, SOLUTION INTRAMUSCULAR; INTRAVENOUS PRN
Status: DISCONTINUED | OUTPATIENT
Start: 2024-08-12 | End: 2024-08-12 | Stop reason: SDUPTHER

## 2024-08-12 RX ORDER — HYDROMORPHONE HYDROCHLORIDE 1 MG/ML
0.5 INJECTION, SOLUTION INTRAMUSCULAR; INTRAVENOUS; SUBCUTANEOUS EVERY 5 MIN PRN
Status: DISCONTINUED | OUTPATIENT
Start: 2024-08-12 | End: 2024-08-12 | Stop reason: HOSPADM

## 2024-08-12 RX ORDER — NALOXONE HYDROCHLORIDE 0.4 MG/ML
INJECTION, SOLUTION INTRAMUSCULAR; INTRAVENOUS; SUBCUTANEOUS PRN
Status: DISCONTINUED | OUTPATIENT
Start: 2024-08-12 | End: 2024-08-12 | Stop reason: HOSPADM

## 2024-08-12 RX ORDER — LIDOCAINE HYDROCHLORIDE 20 MG/ML
INJECTION, SOLUTION INTRAVENOUS PRN
Status: DISCONTINUED | OUTPATIENT
Start: 2024-08-12 | End: 2024-08-12 | Stop reason: SDUPTHER

## 2024-08-12 RX ORDER — SODIUM CHLORIDE 0.9 % (FLUSH) 0.9 %
5-40 SYRINGE (ML) INJECTION EVERY 12 HOURS SCHEDULED
Status: DISCONTINUED | OUTPATIENT
Start: 2024-08-12 | End: 2024-08-12 | Stop reason: HOSPADM

## 2024-08-12 RX ORDER — METHOCARBAMOL 100 MG/ML
INJECTION, SOLUTION INTRAMUSCULAR; INTRAVENOUS PRN
Status: DISCONTINUED | OUTPATIENT
Start: 2024-08-12 | End: 2024-08-12 | Stop reason: SDUPTHER

## 2024-08-12 RX ORDER — BUPIVACAINE HYDROCHLORIDE 5 MG/ML
INJECTION, SOLUTION EPIDURAL; INTRACAUDAL PRN
Status: DISCONTINUED | OUTPATIENT
Start: 2024-08-12 | End: 2024-08-12 | Stop reason: ALTCHOICE

## 2024-08-12 RX ADMIN — VANCOMYCIN HYDROCHLORIDE 500 MG: 1 INJECTION, POWDER, LYOPHILIZED, FOR SOLUTION INTRAVENOUS at 12:49

## 2024-08-12 RX ADMIN — DEXMEDETOMIDINE HYDROCHLORIDE 10 MCG: 100 INJECTION, SOLUTION INTRAVENOUS at 16:23

## 2024-08-12 RX ADMIN — THIAMINE HYDROCHLORIDE 100 MG: 100 INJECTION, SOLUTION INTRAMUSCULAR; INTRAVENOUS at 07:43

## 2024-08-12 RX ADMIN — FENTANYL CITRATE 50 MCG: 50 INJECTION, SOLUTION INTRAMUSCULAR; INTRAVENOUS at 14:14

## 2024-08-12 RX ADMIN — HYDROMORPHONE HYDROCHLORIDE 0.5 MG: 1 INJECTION, SOLUTION INTRAMUSCULAR; INTRAVENOUS; SUBCUTANEOUS at 07:43

## 2024-08-12 RX ADMIN — HYDROMORPHONE HYDROCHLORIDE 0.5 MG: 1 INJECTION, SOLUTION INTRAMUSCULAR; INTRAVENOUS; SUBCUTANEOUS at 12:27

## 2024-08-12 RX ADMIN — METOCLOPRAMIDE 10 MG: 5 INJECTION, SOLUTION INTRAMUSCULAR; INTRAVENOUS at 16:42

## 2024-08-12 RX ADMIN — SODIUM CHLORIDE: 9 INJECTION, SOLUTION INTRAVENOUS at 13:54

## 2024-08-12 RX ADMIN — ROCURONIUM BROMIDE 30 MG: 10 INJECTION, SOLUTION INTRAVENOUS at 14:42

## 2024-08-12 RX ADMIN — METHOCARBAMOL 500 MG: 100 INJECTION, SOLUTION INTRAMUSCULAR; INTRAVENOUS at 16:32

## 2024-08-12 RX ADMIN — VANCOMYCIN HYDROCHLORIDE 500 MG: 1 INJECTION, POWDER, LYOPHILIZED, FOR SOLUTION INTRAVENOUS at 06:30

## 2024-08-12 RX ADMIN — ASCORBIC ACID, VITAMIN A PALMITATE, CHOLECALCIFEROL, THIAMINE HYDROCHLORIDE, RIBOFLAVIN-5 PHOSPHATE SODIUM, PYRIDOXINE HYDROCHLORIDE, NIACINAMIDE, DEXPANTHENOL, ALPHA-TOCOPHEROL ACETATE, VITAMIN K1, FOLIC ACID, BIOTIN, CYANOCOBALAMIN: 200; 3300; 200; 6; 3.6; 6; 40; 15; 10; 150; 600; 60; 5 INJECTION, SOLUTION INTRAVENOUS at 19:05

## 2024-08-12 RX ADMIN — LIDOCAINE HYDROCHLORIDE 50 MG: 20 INJECTION, SOLUTION INTRAVENOUS at 14:04

## 2024-08-12 RX ADMIN — FENTANYL CITRATE 50 MCG: 50 INJECTION, SOLUTION INTRAMUSCULAR; INTRAVENOUS at 13:54

## 2024-08-12 RX ADMIN — ROCURONIUM BROMIDE 10 MG: 10 INJECTION, SOLUTION INTRAVENOUS at 14:07

## 2024-08-12 RX ADMIN — HYDROMORPHONE HYDROCHLORIDE 0.5 MG: 1 INJECTION, SOLUTION INTRAMUSCULAR; INTRAVENOUS; SUBCUTANEOUS at 00:34

## 2024-08-12 RX ADMIN — CIPROFLOXACIN 400 MG: 2 INJECTION, SOLUTION INTRAVENOUS at 00:30

## 2024-08-12 RX ADMIN — METRONIDAZOLE 500 MG: 500 INJECTION, SOLUTION INTRAVENOUS at 13:57

## 2024-08-12 RX ADMIN — VANCOMYCIN HYDROCHLORIDE 500 MG: 1 INJECTION, POWDER, LYOPHILIZED, FOR SOLUTION INTRAVENOUS at 19:44

## 2024-08-12 RX ADMIN — Medication 100 MG: at 14:11

## 2024-08-12 RX ADMIN — SODIUM CHLORIDE, PRESERVATIVE FREE 40 MG: 5 INJECTION INTRAVENOUS at 20:28

## 2024-08-12 RX ADMIN — I.V. FAT EMULSION 250 ML: 20 EMULSION INTRAVENOUS at 19:06

## 2024-08-12 RX ADMIN — SODIUM CHLORIDE, PRESERVATIVE FREE 40 MG: 5 INJECTION INTRAVENOUS at 12:31

## 2024-08-12 RX ADMIN — METRONIDAZOLE 500 MG: 500 INJECTION, SOLUTION INTRAVENOUS at 12:32

## 2024-08-12 RX ADMIN — METRONIDAZOLE 500 MG: 500 INJECTION, SOLUTION INTRAVENOUS at 20:26

## 2024-08-12 RX ADMIN — VANCOMYCIN HYDROCHLORIDE 500 MG: 1 INJECTION, POWDER, LYOPHILIZED, FOR SOLUTION INTRAVENOUS at 00:24

## 2024-08-12 RX ADMIN — ROCURONIUM BROMIDE 50 MG: 10 INJECTION, SOLUTION INTRAVENOUS at 14:20

## 2024-08-12 RX ADMIN — METRONIDAZOLE 500 MG: 500 INJECTION, SOLUTION INTRAVENOUS at 04:17

## 2024-08-12 RX ADMIN — PROPOFOL 80 MG: 10 INJECTION, EMULSION INTRAVENOUS at 14:04

## 2024-08-12 ASSESSMENT — PAIN SCALES - GENERAL
PAINLEVEL_OUTOF10: 3
PAINLEVEL_OUTOF10: 8
PAINLEVEL_OUTOF10: 9
PAINLEVEL_OUTOF10: 0
PAINLEVEL_OUTOF10: 7

## 2024-08-12 ASSESSMENT — PAIN DESCRIPTION - PAIN TYPE: TYPE: SURGICAL PAIN

## 2024-08-12 ASSESSMENT — PAIN DESCRIPTION - ONSET: ONSET: ON-GOING

## 2024-08-12 ASSESSMENT — PAIN DESCRIPTION - ORIENTATION
ORIENTATION: ANTERIOR
ORIENTATION: MID
ORIENTATION: ANTERIOR

## 2024-08-12 ASSESSMENT — PAIN DESCRIPTION - LOCATION
LOCATION: FACE;NOSE
LOCATION: NOSE

## 2024-08-12 ASSESSMENT — PAIN DESCRIPTION - DESCRIPTORS
DESCRIPTORS: ACHING

## 2024-08-12 ASSESSMENT — PAIN DESCRIPTION - FREQUENCY: FREQUENCY: CONTINUOUS

## 2024-08-12 NOTE — PROGRESS NOTES
Occupational Therapy  Facility/Department: [facility] [place]  Occupational Therapy Treatment    Name: Agustina Fried  : 1960  MRN: [id number]  Date of Service: 2024    Discharge Recommendations:  Subacute/Skilled Nursing Facility  OT Equipment Recommendations  Equipment Needed: No  Other: defer       Patient Diagnosis(es): Diagnoses of Small bowel obstruction (HCC) and Rectal bleeding were pertinent to this visit.  Past Medical History:  has a past medical history of Abnormal brain MRI, Acute bilateral low back pain without sciatica, SHARRON (acute kidney injury) (HCC), Arthritis, Bipolar disorder (HCC), CAD (coronary artery disease), Cancer (HCC), Carotid stenosis, bilateral:<50%:per [place] 2016, Carpal tunnel syndrome, Cervical cancer screening, Coronary artery disease of native artery of native heart with stable angina pectoris (HCC), DDD (degenerative disc disease), lumbar, Depression, Depression/anxiety, Depression/anxiety, Diabetes mellitus (HCC), Gout, History of mammogram, History of therapeutic radiation, Hyperlipidemia, Hypertension, 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  Past Surgical History:  has a past surgical history that includes Kidney removal; Hysterectomy; Breast lumpectomy (); Tubal ligation; other surgical history (Right); Cardiac catheterization (2020); Upper gastrointestinal endoscopy (N/A, 2021); Colonoscopy (N/A, 2021); CT BIOPSY BONE MARROW (2/3/2021); Temporal Artery Biopsy (Right, 2021); Upper gastrointestinal endoscopy (N/A, 12/15/2022); Colonoscopy (N/A, 2023); hemicolectomy (N/A, 3/7/2023);

## 2024-08-12 NOTE — PROGRESS NOTES
[facility] -  Clinical Pharmacy Note    Parenteral Nutrition - Management by Pharmacy    Consult Date(s): 8/4/24  Consulting Provider(s): Dr Butler    Assessment / Plan  1)  Gastric outlet obstruction 2/2 ulcerated mass, ileus - Parenteral Nutrition  Day of Therapy: 9  Formulation:  Clinimix E 5/20  Clinimix Rate:  70 mL/hr (Total volume = 1680 mL/day) - now at goal  Lipids:  20% 250mL over 12 hours on Mon & Thurs only (due to national shortage)  Appreciate Clinical Dietitian's recommendations for formulation and goal rate.  Electrolytes:  Clinimix-E includes standard electrolyte formulation.  Recommend further repletion with supplemental IVPBs as needed.  Maintenance IVF:  n/a  Glucose control:    Pt has h/o DM.  Takes Lantus 8 units daily + Humalog 4 units TID with meals at home.  Glucoses ranged 166-334 since current TPN bag hung last evening (with increased insulin). Level of 335 was shortly after bag hung, then glucose 235 overnight and 166 in am..   Used 0 units high dose SSI. (Held by provider)  Increase insulin slightly to 42 units/day  Will monitor glucoses and adjust insulin in TPN bag as needed.     Add MVI 10 mL/day in PN on Mon-Wed-Fri only (due to national shortage) & Trace Elements 1 mL/day in PN daily.  Daily renal panel, daily magnesium, and weekly triglycerides ordered per protocol.  PN will be re-ordered daily.    Please call with any questions    Rose Romero  Pharm.D. BCPS  [phone] (Main Pharmacy)        Interval update:  To OR today for gastrojejunostomy bypass, possible feeding tube placement .     Subjective/Objective:   Agustina Fried is a 64 y.o. female with a PMHx significant for CAD, HTN, HLD, HFpEF, T2DM, TIA, CKD stage 3, Wilms tumor s/p remote R nephrectomy, anemia, breast cancer, invasive adenocarcinoma s/p R colectomy (3/2023) bipolar dx, depression, anxiety, gout, hx pancreatitis, who is admitted as a transfer from [facility] with gastric outlet obstruction 2/2 large ulcerated mass in  duodenum.  PICC placed and TPN started 8/4/24.    Pharmacy is consulted to manage parenteral nutrition.    Ht Readings from Last 1 Encounters:   08/02/24 1.6 m (5' 2.99\")     Wt Readings from Last 1 Encounters:   08/09/24 77.6 kg (171 lb)     Recent Labs     08/11/24  0548 08/12/24  0452   * 138   K 3.9 3.7    107   CO2 22 22   PHOS 2.5 3.0   GLUCOSE 218* 178*   BUN 23* 27*   CREATININE 1.2 1.2   CALCIUM 7.5* 7.5*   MG 1.70* 2.10     Albumin   Date Value Ref Range Status   08/12/2024 2.0 (L) 3.4 - 5.0 g/dL Final   08/12/2024 2.1 (L) 3.4 - 5.0 g/dL Final     Corrected Calcium: 9.1 mg/dL    Recent Labs     08/11/24  1214 08/11/24  1900 08/12/24  0047 08/12/24  0655   POCGLU 225* 334* 235* 166*     Sliding scale insulin used since PN bag hung yesterday: 0 units (held by provider)    Recent Labs     08/11/24  0547 08/12/24  0452   WBC 7.6 6.5   HGB 7.6* 7.1*    137     Triglycerides   Date Value Ref Range Status   08/11/2024 20 0 - 150 mg/dL Final   08/06/2024 88 0 - 150 mg/dL Final   08/05/2024 91 0 - 150 mg/dL Final   08/04/2024 70 0 - 150 mg/dL Final   11/27/2023 89 0 - 150 mg/dL Final

## 2024-08-12 NOTE — ANESTHESIA PRE PROCEDURE
Department of Anesthesiology  Preprocedure Note       Name:  Agustina Fried   Age:  64 y.o.  :  1960                                          MRN:  [id number]         Date:  2024      Surgeon: Surgeon(s):  Julio aVlle MD    Procedure: Procedure(s):  diagnostic laparoscopy, possible exploratory laparotomy, gastrojejunostomy BYPASS, possible feeding tube placement  .  .  .    Medications prior to admission:   Prior to Admission medications    Medication Sig Start Date End Date Taking? Authorizing Provider   oxyCODONE-acetaminophen (PERCOCET) 5-325 MG per tablet Take 1 tablet by mouth every 8 hours as needed for Pain. Max Daily Amount: 3 tablets    Provider, MD Imani   loperamide (IMODIUM) 2 MG capsule Take 1 capsule by mouth 4 times daily as needed for Diarrhea    ProviderImani MD   acetaminophen (TYLENOL) 325 MG tablet Take 2 tablets by mouth every 6 hours as needed for Pain    ProviderImani MD   torsemide (DEMADEX) 10 MG tablet Take 1 tablet by mouth daily 24   Charlie Gunn MD   gabapentin (NEURONTIN) 100 MG capsule Take 2 capsules by mouth 2 times daily for 30 days. 24  Charlie Gunn MD   pantoprazole (PROTONIX) 40 MG tablet Take 1 tablet by mouth 2 times daily at 0800 and 1400 24   Ankur Douglas MD   traZODone (DESYREL) 100 MG tablet Take 1 tablet by mouth nightly 24   Ankur Douglas MD   insulin glargine (LANTUS) 100 UNIT/ML injection vial Inject 8 Units into the skin daily 24   Ankur Douglas MD   amLODIPine (NORVASC) 5 MG tablet Take 1 tablet by mouth daily 24   Ankur Douglas MD   Continuous Blood Gluc Sensor (FREESTYLE CHRISTY 2 SENSOR) MISC Replace every 2 weeks 24   Anny Franklin MD   insulin lispro, 1 Unit Dial, (HUMALOG KWIKPEN) 100 UNIT/ML SOPN Inject 0-4 Units into the skin 3 times daily (before meals) 24   Anny Franklin MD   atorvastatin (LIPITOR) 40 MG tablet Take 1 tablet

## 2024-08-12 NOTE — BRIEF OP NOTE
Brief Postoperative Note      Patient: Agustina rFied  YOB: 1960  MRN: [id number]    Date of Procedure: 8/12/2024    Pre-Op Diagnosis Codes:      * Duodenal obstruction [K31.5]    Post-Op Diagnosis: Post-Op Diagnosis Codes:     * Duodenal obstruction [K31.5]       Procedure(s):  diagnostic laparoscopy, exploratory laparotomy, jose-en-y gastrojejunostomy bypass, feeding tube placement, and extensive lysis of adhesions    Surgeon(s):  Julio Valle MD    Assistant:  Surgical Assistant: Yari Felder  Resident: Ysabel Tao MD    Anesthesia: General    Estimated Blood Loss (mL): less than 100     Complications: None    Specimens:   ID Type Source Tests Collected by Time Destination   A : PERITONEUM NODULE Tissue Tissue SURGICAL PATHOLOGY Julio Valle MD 8/12/2024 1443    B : LIVER NODULE Tissue Tissue SURGICAL PATHOLOGY Julio Valle MD 8/12/2024 1520    C : A) omentum nodule Tissue Tissue SURGICAL PATHOLOGY Julio Valle MD 8/12/2024 1535        Implants:  * No implants in log *      Drains:   NG/OG/NJ/NE Tube Nasogastric 18 fr Right nostril (Active)   Surrounding Skin Clean, dry & intact 08/12/24 1129   Securement device Tape 08/12/24 1129   Status Clamped 08/12/24 1129   Placement Verified External Catheter Length 08/08/24 0920   NG/OG/NJ/NE External Measurement (cm) 63 cm 08/08/24 0920   Drainage Appearance Green;Brown 08/12/24 1129   Free Water/Flush (mL) 30 mL 08/08/24 0920   Output (mL) 375 ml 08/12/24 1234   Action Taken Other (comment) 08/08/24 2031       Gastrostomy/Enterostomy/Jejunostomy Tube  LUQ 18 fr (Active)       Urinary Catheter 08/12/24 2 Way;Dooley-Temperature (Active)   Catheter Indications Perioperative use for selected surgical procedures 08/12/24 1439   Urine Color Yellow 08/12/24 1439   Urine Appearance Clear 08/12/24 1439   Collection Container Standard 08/12/24 1439   Securement Method Securing device (Describe) 08/12/24 1439    Catheter Best Practices  Drainage tube clipped to bed;Catheter secured to thigh;Tamper seal intact;Bag below bladder;Bag not on floor;Lack of dependent loop in tubing;Drainage bag less than half full 08/12/24 1439   Status Draining 08/12/24 1439       [REMOVED] External Urinary Catheter (Removed)   Site Assessment Clean,dry & intact 07/06/24 2115   Placement Repositioned 07/06/24 2115   Securement Method Securing device (Describe) 07/06/24 2115   Catheter Care Catheter/Wick replaced;Suction Canister/Tubing changed 07/06/24 1801   Perineal Care Yes 07/06/24 1801   Suction 40 mmgHg continuous 07/06/24 2115   Urine Color Yellow 07/06/24 1801   Urine Appearance Clear 07/06/24 1801       [REMOVED] External Urinary Catheter (Removed)   Site Assessment Clean,dry & intact 07/31/24 0833   Suction 40 mmgHg continuous 07/31/24 0833   Urine Color Yellow 07/31/24 0833   Urine Appearance Cloudy 07/31/24 0833   Output (mL) 0 mL 07/31/24 1142       Findings:  Infection Present At Time Of Surgery (PATOS) (choose all levels that have infection present):  No infection present  Other Findings: small peritoneal nodules, sent to pathology    Electronically signed by Ysabel Tao MD on 8/12/2024 at 5:06 PM

## 2024-08-12 NOTE — PROGRESS NOTES
PACU Transfer Note    Vitals:    08/12/24 1820   BP: (!) 113/49   Pulse: 58   Resp: 12   Temp: 97.7 °F (36.5 °C)   SpO2: 98%   Solo ISAAC reports has had soft DBP.   In: 2729.7 [I.V.:880]  Out: 1800 [Urine:825]    Pain assessment:  not receiving treatment  Pain Level: 0    Report given to Receiving unit PONCHO Banda.    8/12/2024 6:39 PM

## 2024-08-12 NOTE — PROGRESS NOTES
General Surgery   Daily Progress Note  Patient: Agustina Fried      CC: Duodenal mass    SUBJECTIVE:   NAEON. Patient is resting comfortably and ready for operation today. Reports no abdominal pain.    ROS:   A 14 point review of systems was conducted, significant findings as noted above. All other systems negative.    OBJECTIVE:    PHYSICAL EXAM:    Vitals:    08/11/24 2002 08/12/24 0021 08/12/24 0413 08/12/24 0741   BP: (!) 153/76 (!) 177/70 (!) 142/62 (!) 163/65   Pulse: 60 62 59 60   Resp: 16 16 16 16   Temp: 98.6 °F (37 °C) 98.2 °F (36.8 °C) 97.9 °F (36.6 °C) 98.3 °F (36.8 °C)   TempSrc: Oral Oral Axillary Axillary   SpO2: 90% 99% 99% 100%   Weight:       Height:           General appearance: Alert, no acute distress  Chest/Lungs: Normal effort with no accessory muscle use on RA  ENT: NG tube in place with gastric contents  Cardiovascular: well perfused  Abdomen: Soft, non-distended, non-tender, no rebound, guarding, or rigidity. Well-healed midline ex lap scar; NGT in place.  Skin: Warm and dry, no rashes  Extremities: No edema, no cyanosis  Neuro: Alert, sensation intact      ASSESSMENT & PLAN:   This is a 64 y.o. female with Hx of HTN, DM, GERD, hyperlipidemia, CKD stage IIIb and surgical history significant for Wilms tumor s/p right nephrectomy (age 8), breast cancer s/p radiation and lumpectomy (09/2014), invasive adenocarcinoma s/p R colectomy (03/2023) who presented with gastric outlet obstruction symptoms due to large ulcerated mass in the second portion of duodenum.     - Patient is preopped and consented for OR today  - Continue NGT  - Remainder of care per primary      Cindy Owusu MD  PGY1, General Surgery  08/12/24  8:10 AM  [phone]

## 2024-08-12 NOTE — PROGRESS NOTES
Pt is being transported to don-op with their transport team member, pt has been NPO since midnight and NG tube to suction as well. Pt's family members are here and were heading down to second floor waiting area    Electronically signed by Solo Lutz RN on 8/12/2024 at 1:00 PM

## 2024-08-12 NOTE — CARE COORDINATION
Patient admitted from [facility] and would like to return there. Will need PT/OT evals post-op for precert to return for rehab at d/c.     Electronically signed by Elaina Rutherford RN on 8/12/2024 at 1:46 PM  [phone]

## 2024-08-12 NOTE — PROGRESS NOTES
1722 Admitted to PACU from OR. Connected to monitor. Report at bedside. Instructed to connect G tube to gravity drainage - done. Instructed to apply binder to protect G/J tube - done. No sign of pain. Not communicative. . Left AC IV removed - could not flush.

## 2024-08-12 NOTE — PROGRESS NOTES
Ph: [phone], Fax: [phone]           [facility]               Reason for admission:                 Pain abdomen nausea and vomiting    Brief Summary :     Agustina Fried is being seen by nephrology for CKD       Interval History and plan:     Seen in room  BP is  elevated    Urine is ? Ml + Emesis 1650ml  Creatinine is 1.2  Labs are pending        Continue clonidine patch to 0.3 mg per 24 hours  IVF stopped as blood glucose improved  Stable GFR                     Assessment :       CKD Stage IIIa  Creatinine has been variable in the past she has a history of recurrent SHARRON  Last creatinine was 2 upon discharge from 7/2 hospitalization  she has history of diabetes mellitus hypertension    CHF  Echo (7/2/24)    Limited only for LVEF and RVF.    Image quality is adequate.    Left Ventricle: Normal left ventricular systolic function with a visually estimated EF of 55 - 60%. Normal wall motion.    Right ventricle size is normal. Normal systolic function.    Does not appear volume overloaded      Hypertension   BP: (142-163)/(62-65)  Pulse:  [59-60]   BP goal inpatient 130-140 systolic inpatient        [facility] would like to thank Zahida Koenig MD   for opportunity to serve this patient      Please call with questions at-   24 Hrs Answering service [phone] or  7 am- 5 pm via Perfect serve or cell phone  Dr.Muhammad Dayne Trimble MD       HPI :     Agustina Fried is a 64 y.o. female presented to   the hospital on 8/2/2024 with  pain abdomen nausea and vomiting.  She is known to have nausea vomiting diarrhea for several days because of which she came to the hospital.  She needed NG tube placement in the emergency room.  Found to have severe gastric distention.  She is known to have CKD and has had multiple hospitalization in the past        PMH/PSH/SH/Family History:     Past Medical History:   Diagnosis Date    Abnormal brain MRI 7/20/2017    Partially empty sella and  mg, IntraVENous, Q8H  [Held by provider] insulin lispro (HUMALOG,ADMELOG) injection vial 0-16 Units, 0-16 Units, SubCUTAneous, 4 times per day  pantoprazole (PROTONIX) 40 mg in sodium chloride (PF) 0.9 % 10 mL injection, 40 mg, IntraVENous, Q12H  sodium chloride flush 0.9 % injection 5-40 mL, 5-40 mL, IntraVENous, 2 times per day  sodium chloride flush 0.9 % injection 5-40 mL, 5-40 mL, IntraVENous, PRN  0.9 % sodium chloride infusion, , IntraVENous, PRN  [Held by provider] enoxaparin (LOVENOX) injection 40 mg, 40 mg, SubCUTAneous, Daily  thiamine (B-1) injection 100 mg, 100 mg, IntraVENous, Daily  diatrizoate meglumine-sodium (GASTROGRAFIN) 66-10 % solution 30 mL, 30 mL, Rectal, ONCE PRN  diatrizoate meglumine-sodium (GASTROGRAFIN) 66-10 % solution 30 mL, 30 mL, Per NG tube, ONCE PRN  HYDROmorphone (DILAUDID) injection 0.25 mg, 0.25 mg, IntraVENous, Q3H PRN **OR** HYDROmorphone (DILAUDID) injection 0.5 mg, 0.5 mg, IntraVENous, Q3H PRN  vancomycin 500 mg in sodium chloride 0.9% 100 mL enema, 500 mg, Rectal, Q6H  sodium chloride flush 0.9 % injection 5-40 mL, 5-40 mL, IntraVENous, 2 times per day  sodium chloride flush 0.9 % injection 5-40 mL, 5-40 mL, IntraVENous, PRN  0.9 % sodium chloride infusion, , IntraVENous, PRN  ondansetron (ZOFRAN-ODT) disintegrating tablet 4 mg, 4 mg, Oral, Q8H PRN **OR** ondansetron (ZOFRAN) injection 4 mg, 4 mg, IntraVENous, Q6H PRN  polyethylene glycol (GLYCOLAX) packet 17 g, 17 g, Oral, Daily PRN  acetaminophen (TYLENOL) tablet 650 mg, 650 mg, Oral, Q6H PRN **OR** acetaminophen (TYLENOL) suppository 650 mg, 650 mg, Rectal, Q6H PRN  glucose chewable tablet 16 g, 4 tablet, Oral, PRN  dextrose bolus 10% 125 mL, 125 mL, IntraVENous, PRN **OR** dextrose bolus 10% 250 mL, 250 mL, IntraVENous, PRN  glucagon injection 1 mg, 1 mg, SubCUTAneous, PRN  dextrose 10 % infusion, , IntraVENous, Continuous PRN    Vitals :     Vitals:    08/12/24 0741   BP: (!) 163/65   Pulse: 60   Resp: 16   Temp: 98.3  °F (36.8 °C)   SpO2: 100%          Physical Examination :     Appearance: Alert, awake. NAD.   Respiratory:  No RD on room air.   Cardiovascular: Edema none  Abdomen:  soft  Other relevant findings: NG tube placed.    Labs :     CBC:   Recent Labs     08/10/24  0625 08/11/24  0547 08/12/24  0452   WBC 8.0 7.6 6.5   HGB 7.9* 7.6* 7.1*   HCT 24.0* 22.9* 22.1*    138 137     BMP:    Recent Labs     08/10/24  0625 08/10/24  1641 08/11/24  0548    137 135*   K 3.0* 4.0 3.9   * 109 106   CO2 24 20* 22   BUN 24* 25* 23*   CREATININE 1.2 1.2 1.2   GLUCOSE 40* 108* 218*   MG 1.60*  --  1.70*   PHOS 2.6 2.7 2.5     Lab Results   Component Value Date/Time    COLORU Yellow 07/31/2024 04:34 AM    NITRU Negative 07/31/2024 04:34 AM    GLUCOSEU Negative 07/31/2024 04:34 AM    GLUCOSEU >=1000 mg/dL 06/07/2010 03:38 PM    KETUA Negative 07/31/2024 04:34 AM    UROBILINOGEN 0.2 07/31/2024 04:34 AM    BILIRUBINUR Negative 07/31/2024 04:34 AM        ----------------------------------------------------------  Please call with questions at      24 Hrs Answering service [phone]  Perfect serve, or cell phone 7 am - 5pm  Glen Plascencia MD   [facility]

## 2024-08-12 NOTE — PROGRESS NOTES
V2.0    [facility] Progress Note      Name:  Agustina Fried /Age/Sex: 1960  (64 y.o. female)   MRN & CSN:  [id number] & [id number] Encounter Date/Time: 2024 9:46 AM EDT   Location:  5319/5319-01 PCP: Viridiana Blanco APRN - NP     Attending:Zahida Koenig MD       Hospital Day: 11    Assessment and Recommendations   Agustina Fried is a 64 y.o. female with pmh of CVA, depression, anxiety, diabetes, hypertension, heart failure, CHF, renal, breast cancer, colon cancer who presents with nausea vomiting and rectal bleeding.  CT showed severe gastric distention.  She underwent a EGD which showed Mancy resulting in obstruction.  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  -CT showed a 6 cm area of necrotic mass along the duodenum which abuts the hepatic flexure of the colon  -EGD showed duodenal compression with a large mass obstructing that could not be bypassed with the scope  -Colonoscopy on  showed bleeding mass, suggestive external growth into the colon  -Pathology showed high-grade adenocarcinoma  -GI oncology surgery consulted  -Continue decompression  -Nutrition through TPN    Hypertension-blood pressure on the higher side      Possible refeeding syndrome  -Replace electrolytes aggressively    Elevated ALP  -Prior MRI showed mild intra and extrahepatic biliary dilatation with abrupt tapering of the CBD and pancreatic duct dilatation  -Patient never followed up for EUS    CKD stage IIIa-creatinine improved  -Nephrology following    C. difficile colitis  -On IV Flagyl and rectal vancomycin D8  -Unable to take p.o.  -Patient denies any diarrhea.  Will consult ID      Patient was admitted from  to  with CHF exacerbation with ESBL UTI treated with meropenem.  She     Diet PN-Adult Premix  - Standard Electrolytes - Central Line  Diet NPO   DVT Prophylaxis [] Lovenox, []  Heparin, [] SCDs, []  Ambulation,  [] Eliquis, [] Xarelto  [] Coumadin   Code Status Full Code   Disposition From: home  Expected Disposition: home  Estimated Date of Discharge: unclear  Patient requires continued admission due to    Surrogate Decision Maker/ POA       Personally reviewed Lab Studies and Imaging         Drugs that require monitoring for toxicity include IV pain medication method of monitoring vitals        Subjective:     Chief Complaint: Nausea vomiting    Agustina Fried is a 64 y.o. female who presents with duodenal obstruction    Patient complaining of a lot of pain.  Finds NG very uncomfortable      Review of Systems:      Pertinent positives and negatives discussed in HPI    Objective:     Intake/Output Summary (Last 24 hours) at 8/12/2024 0946  Last data filed at 8/12/2024 0750  Gross per 24 hour   Intake 1099.66 ml   Output 2350 ml   Net -1250.34 ml      Vitals:   Vitals:    08/11/24 2002 08/12/24 0021 08/12/24 0413 08/12/24 0741   BP: (!) 153/76 (!) 177/70 (!) 142/62 (!) 163/65   Pulse: 60 62 59 60   Resp: 16 16 16 16   Temp: 98.6 °F (37 °C) 98.2 °F (36.8 °C) 97.9 °F (36.6 °C) 98.3 °F (36.8 °C)   TempSrc: Oral Oral Axillary Axillary   SpO2: 90% 99% 99% 100%   Weight:       Height:             Physical Exam:      General: Thin built ill looking   Eyes: EOMI  ENT: neck supple  Cardiovascular: Regular rate.  Respiratory: Clear to auscultation  Gastrointestinal: Soft, non tender  Genitourinary: no suprapubic tenderness  Musculoskeletal: No edema  Skin: warm, dry  Neuro: Alert.  Psych: Mood appropriate.         Medications:   Medications:    cloNIDine  1 patch TransDERmal Weekly    metroNIDAZOLE  500 mg IntraVENous Q8H    [Held by provider] insulin lispro  0-16 Units SubCUTAneous 4 times per day    pantoprazole (PROTONIX) 40 mg in sodium chloride (PF) 0.9 % 10 mL injection  40 mg IntraVENous Q12H    sodium chloride flush  5-40 mL IntraVENous 2 times per day    [Held by provider] enoxaparin  40 mg SubCUTAneous Daily     thiamine  100 mg IntraVENous Daily    vancomycin 500 mg in sodium chloride 0.9% 100 mL enema  500 mg Rectal Q6H    sodium chloride flush  5-40 mL IntraVENous 2 times per day      Infusions:    PN-Adult Premix 5/20 - Standard Electrolytes - Central Line 70 mL/hr at 08/11/24 1746    sodium chloride 10 mL/hr at 08/07/24 2237    sodium chloride 5 mL/hr at 08/04/24 1851    dextrose       PRN Meds: phenol, 1 spray, Q2H PRN  sodium chloride flush, 5-40 mL, PRN  sodium chloride, , PRN  diatrizoate meglumine-sodium, 30 mL, ONCE PRN  diatrizoate meglumine-sodium, 30 mL, ONCE PRN  HYDROmorphone, 0.25 mg, Q3H PRN   Or  HYDROmorphone, 0.5 mg, Q3H PRN  sodium chloride flush, 5-40 mL, PRN  sodium chloride, , PRN  ondansetron, 4 mg, Q8H PRN   Or  ondansetron, 4 mg, Q6H PRN  polyethylene glycol, 17 g, Daily PRN  acetaminophen, 650 mg, Q6H PRN   Or  acetaminophen, 650 mg, Q6H PRN  glucose, 4 tablet, PRN  dextrose bolus, 125 mL, PRN   Or  dextrose bolus, 250 mL, PRN  glucagon (rDNA), 1 mg, PRN  dextrose, , Continuous PRN        Labs and Imaging   XR ABDOMEN (KUB) (SINGLE AP VIEW)    Result Date: 8/11/2024  EXAM: AP ABDOMEN INDICATION: NGT placement COMPARISON: 8/4/2024 FINDINGS: Nasogastric tube tip is in the left upper quadrant, projection of the body of the stomach.     Nasogastric tube tip in the stomach. Electronically signed by Lito Ellison MD    EGD    Result Date: 8/5/2024  No dictation       CBC:   Recent Labs     08/10/24  0625 08/11/24  0547 08/12/24  0452   WBC 8.0 7.6 6.5   HGB 7.9* 7.6* 7.1*    138 137     BMP:    Recent Labs     08/10/24  0625 08/10/24  1641 08/11/24  0548    137 135*   K 3.0* 4.0 3.9   * 109 106   CO2 24 20* 22   BUN 24* 25* 23*   CREATININE 1.2 1.2 1.2   GLUCOSE 40* 108* 218*     Hepatic:   Recent Labs     08/12/24  0452   AST 12*   ALT <5*   BILITOT <0.2   ALKPHOS 134*     Lipids:   Lab Results   Component Value Date/Time    CHOL 154 11/27/2023 07:00 PM    HDL 75 11/27/2023 07:00 PM

## 2024-08-12 NOTE — ANESTHESIA POSTPROCEDURE EVALUATION
Department of Anesthesiology  Postprocedure Note    Patient: Agustina Fried  MRN: [id number]  YOB: 1960  Date of evaluation: 8/12/2024    Procedure Summary       Date: 08/12/24 Room / Location: [place] [address] / [facility]    Anesthesia Start: 1354 Anesthesia Stop: 1728    Procedure: diagnostic laparoscopy, exploratory laparotomy, jose-en-y gastrojejunostomy bypass, feeding tube placement, and extensive lysis of adhesions (Abdomen) Diagnosis:       Duodenal obstruction      (Duodenal obstruction [K31.5])    Surgeons: Julio Valle MD Responsible Provider: Zhen Gonzales MD    Anesthesia Type: general ASA Status: 3            Anesthesia Type: No value filed.    Marnie Phase I: Marnie Score: 8    Marnie Phase II: Marnie Score: 10    Anesthesia Post Evaluation    Patient location during evaluation: PACU  Patient participation: complete - patient participated  Level of consciousness: awake  Pain score: 3  Airway patency: patent  Nausea & Vomiting: no nausea  Cardiovascular status: blood pressure returned to baseline  Respiratory status: acceptable  Hydration status: euvolemic  Pain management: adequate    No notable events documented.

## 2024-08-13 ENCOUNTER — APPOINTMENT (OUTPATIENT)
Dept: GENERAL RADIOLOGY | Age: 64
End: 2024-08-13
Attending: SURGERY
Payer: MEDICAID

## 2024-08-13 PROBLEM — K56.609 SMALL BOWEL OBSTRUCTION (HCC): Status: ACTIVE | Noted: 2024-08-13

## 2024-08-13 LAB
ALBUMIN SERPL-MCNC: 1.9 G/DL (ref 3.4–5)
ANION GAP SERPL CALCULATED.3IONS-SCNC: 7 MMOL/L (ref 3–16)
BUN SERPL-MCNC: 32 MG/DL (ref 7–20)
CALCIUM SERPL-MCNC: 7.2 MG/DL (ref 8.3–10.6)
CHLORIDE SERPL-SCNC: 109 MMOL/L (ref 99–110)
CO2 SERPL-SCNC: 22 MMOL/L (ref 21–32)
CREAT SERPL-MCNC: 1.4 MG/DL (ref 0.6–1.2)
GFR SERPLBLD CREATININE-BSD FMLA CKD-EPI: 42 ML/MIN/{1.73_M2}
GLUCOSE BLD-MCNC: 126 MG/DL (ref 70–99)
GLUCOSE BLD-MCNC: 180 MG/DL (ref 70–99)
GLUCOSE BLD-MCNC: 81 MG/DL (ref 70–99)
GLUCOSE BLD-MCNC: 87 MG/DL (ref 70–99)
GLUCOSE SERPL-MCNC: 154 MG/DL (ref 70–99)
MAGNESIUM SERPL-MCNC: 1.9 MG/DL (ref 1.8–2.4)
PERFORMED ON: ABNORMAL
PERFORMED ON: ABNORMAL
PERFORMED ON: NORMAL
PERFORMED ON: NORMAL
PHOSPHATE SERPL-MCNC: 3 MG/DL (ref 2.5–4.9)
POTASSIUM SERPL-SCNC: 3.9 MMOL/L (ref 3.5–5.1)
SODIUM SERPL-SCNC: 138 MMOL/L (ref 136–145)

## 2024-08-13 PROCEDURE — P9047 ALBUMIN (HUMAN), 25%, 50ML: HCPCS | Performed by: INTERNAL MEDICINE

## 2024-08-13 PROCEDURE — 80069 RENAL FUNCTION PANEL: CPT

## 2024-08-13 PROCEDURE — 6360000002 HC RX W HCPCS

## 2024-08-13 PROCEDURE — 2580000003 HC RX 258

## 2024-08-13 PROCEDURE — 6360000002 HC RX W HCPCS: Performed by: INTERNAL MEDICINE

## 2024-08-13 PROCEDURE — 1200000000 HC SEMI PRIVATE

## 2024-08-13 PROCEDURE — 2580000003 HC RX 258: Performed by: INTERNAL MEDICINE

## 2024-08-13 PROCEDURE — 2500000003 HC RX 250 WO HCPCS

## 2024-08-13 PROCEDURE — 6370000000 HC RX 637 (ALT 250 FOR IP)

## 2024-08-13 PROCEDURE — 83735 ASSAY OF MAGNESIUM: CPT

## 2024-08-13 PROCEDURE — 99231 SBSQ HOSP IP/OBS SF/LOW 25: CPT | Performed by: INTERNAL MEDICINE

## 2024-08-13 PROCEDURE — 74019 RADEX ABDOMEN 2 VIEWS: CPT

## 2024-08-13 PROCEDURE — 99255 IP/OBS CONSLTJ NEW/EST HI 80: CPT | Performed by: INTERNAL MEDICINE

## 2024-08-13 RX ORDER — ALBUMIN (HUMAN) 12.5 G/50ML
25 SOLUTION INTRAVENOUS EVERY 6 HOURS
Status: COMPLETED | OUTPATIENT
Start: 2024-08-13 | End: 2024-08-13

## 2024-08-13 RX ORDER — SODIUM CHLORIDE 9 MG/ML
INJECTION, SOLUTION INTRAVENOUS CONTINUOUS
Status: ACTIVE | OUTPATIENT
Start: 2024-08-13 | End: 2024-08-13

## 2024-08-13 RX ADMIN — HYDROMORPHONE HYDROCHLORIDE 0.5 MG: 1 INJECTION, SOLUTION INTRAMUSCULAR; INTRAVENOUS; SUBCUTANEOUS at 01:46

## 2024-08-13 RX ADMIN — HYDROMORPHONE HYDROCHLORIDE 0.5 MG: 1 INJECTION, SOLUTION INTRAMUSCULAR; INTRAVENOUS; SUBCUTANEOUS at 10:13

## 2024-08-13 RX ADMIN — TRACE ELEMENTS INJECTION 4: 7.4; .75; 98; 151 INJECTION, SOLUTION INTRAVENOUS at 18:47

## 2024-08-13 RX ADMIN — VANCOMYCIN HYDROCHLORIDE 500 MG: 1 INJECTION, POWDER, LYOPHILIZED, FOR SOLUTION INTRAVENOUS at 05:31

## 2024-08-13 RX ADMIN — VANCOMYCIN HYDROCHLORIDE 500 MG: 1 INJECTION, POWDER, LYOPHILIZED, FOR SOLUTION INTRAVENOUS at 14:19

## 2024-08-13 RX ADMIN — SODIUM CHLORIDE, PRESERVATIVE FREE 40 MG: 5 INJECTION INTRAVENOUS at 09:37

## 2024-08-13 RX ADMIN — METRONIDAZOLE 500 MG: 500 INJECTION, SOLUTION INTRAVENOUS at 20:30

## 2024-08-13 RX ADMIN — HYDROMORPHONE HYDROCHLORIDE 0.5 MG: 1 INJECTION, SOLUTION INTRAMUSCULAR; INTRAVENOUS; SUBCUTANEOUS at 21:55

## 2024-08-13 RX ADMIN — ENOXAPARIN SODIUM 40 MG: 100 INJECTION SUBCUTANEOUS at 09:36

## 2024-08-13 RX ADMIN — SODIUM CHLORIDE, PRESERVATIVE FREE 40 MG: 5 INJECTION INTRAVENOUS at 21:42

## 2024-08-13 RX ADMIN — METRONIDAZOLE 500 MG: 500 INJECTION, SOLUTION INTRAVENOUS at 04:41

## 2024-08-13 RX ADMIN — SODIUM CHLORIDE: 9 INJECTION, SOLUTION INTRAVENOUS at 10:12

## 2024-08-13 RX ADMIN — VANCOMYCIN HYDROCHLORIDE 500 MG: 1 INJECTION, POWDER, LYOPHILIZED, FOR SOLUTION INTRAVENOUS at 18:39

## 2024-08-13 RX ADMIN — METRONIDAZOLE 500 MG: 500 INJECTION, SOLUTION INTRAVENOUS at 10:55

## 2024-08-13 RX ADMIN — ALBUMIN (HUMAN) 25 G: 0.25 INJECTION, SOLUTION INTRAVENOUS at 09:33

## 2024-08-13 RX ADMIN — ALBUMIN (HUMAN) 25 G: 0.25 INJECTION, SOLUTION INTRAVENOUS at 14:18

## 2024-08-13 RX ADMIN — THIAMINE HYDROCHLORIDE 100 MG: 100 INJECTION, SOLUTION INTRAMUSCULAR; INTRAVENOUS at 09:35

## 2024-08-13 RX ADMIN — HYDROMORPHONE HYDROCHLORIDE 0.5 MG: 1 INJECTION, SOLUTION INTRAMUSCULAR; INTRAVENOUS; SUBCUTANEOUS at 16:17

## 2024-08-13 ASSESSMENT — PAIN SCALES - WONG BAKER: WONGBAKER_NUMERICALRESPONSE: NO HURT

## 2024-08-13 ASSESSMENT — PAIN DESCRIPTION - ONSET
ONSET: ON-GOING
ONSET: ON-GOING

## 2024-08-13 ASSESSMENT — PAIN DESCRIPTION - LOCATION
LOCATION: ABDOMEN

## 2024-08-13 ASSESSMENT — PAIN DESCRIPTION - ORIENTATION
ORIENTATION: MID
ORIENTATION: MID

## 2024-08-13 ASSESSMENT — PAIN SCALES - GENERAL
PAINLEVEL_OUTOF10: 3
PAINLEVEL_OUTOF10: 9
PAINLEVEL_OUTOF10: 9
PAINLEVEL_OUTOF10: 6
PAINLEVEL_OUTOF10: 7
PAINLEVEL_OUTOF10: 0
PAINLEVEL_OUTOF10: 9
PAINLEVEL_OUTOF10: 0
PAINLEVEL_OUTOF10: 7

## 2024-08-13 ASSESSMENT — PAIN - FUNCTIONAL ASSESSMENT
PAIN_FUNCTIONAL_ASSESSMENT: ACTIVITIES ARE NOT PREVENTED

## 2024-08-13 ASSESSMENT — PAIN DESCRIPTION - FREQUENCY
FREQUENCY: CONTINUOUS
FREQUENCY: INTERMITTENT

## 2024-08-13 ASSESSMENT — PAIN DESCRIPTION - PAIN TYPE
TYPE: SURGICAL PAIN
TYPE: SURGICAL PAIN

## 2024-08-13 ASSESSMENT — PAIN DESCRIPTION - DESCRIPTORS
DESCRIPTORS: ACHING
DESCRIPTORS: DISCOMFORT
DESCRIPTORS: CRAMPING
DESCRIPTORS: DISCOMFORT

## 2024-08-13 NOTE — CARE COORDINATION
Patient admitted from [facility] and per our conversation this AM, she would like to return there at d/c.     Patient will need PT/OT evals, Patient needs to be off TPN at d/c for [facility]. Precert needed for placement.     Per surgery plan to d/c TPN once patient can tolerate tube feeds.     Electronically signed by Elaina Rutherford RN on 8/13/2024 at 2:29 PM  [phone]

## 2024-08-13 NOTE — PROGRESS NOTES
V2.0    [facility] [place]      Name:  Agustina Fried /Age/Sex: 1960  (64 y.o. female)   MRN & CSN:  [id number] & [id number] Encounter Date/Time: 2024 9:46 AM EDT   Location:  5319/5319-01 PCP: Viridiana Blanco APRN - NP     Attending:Zahida Koenig MD       Hospital Day: 12    Assessment and Recommendations   Agustina Fried is a 64 y.o. female with pmh of CVA, depression, anxiety, diabetes, hypertension, heart failure, CHF, renal, breast cancer, colon cancer who presents with nausea vomiting and rectal bleeding.  CT showed severe gastric distention.  She underwent a EGD which showed duodenal mass resulting in obstruction.  Patient was transferred to [facility] for surgery oncology input.  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  -s/p diagnostic laparoscopy with exploratory laparotomy gastrojejunal bypass with feeding tube placement and lysis of additions on 2024   -CT showed a 6 cm area of necrotic mass along the duodenum which abuts the hepatic flexure of the colon  -EGD showed duodenal compression with a large mass obstructing that could not be bypassed with the scope  -Colonoscopy on  showed bleeding mass, suggestive external growth into the colon  -Pathology showed high-grade adenocarcinoma  -GI oncology surgery consulted  -Continue decompression  -Nutrition through TPN    Hypertension-blood pressure improved  -Continue clonidine patch       Possible refeeding syndrome  -Replace electrolytes aggressively    Elevated ALP  -Prior MRI showed mild intra and extrahepatic biliary dilatation with abrupt tapering of the CBD and pancreatic duct dilatation  -Patient never followed up for EUS    CKD stage IIIa-creatinine starting to trend up, getting albumin  -Nephrology following    C. difficile  colitis  -On IV Flagyl and rectal vancomycin D9  -Unable to take p.o.  -Patient denies any diarrhea.  Will consult ID      Patient was admitted from 7/1 to 7/12 with CHF exacerbation with ESBL UTI treated with meropenem.  She     Diet Diet NPO  PN-Adult Premix 5/20 - Standard Electrolytes - Central Line  PN-Adult Premix 5/20 - Standard Electrolytes - Central Line   DVT Prophylaxis [] Lovenox, []  Heparin, [] SCDs, [] Ambulation,  [] Eliquis, [] Xarelto  [] Coumadin   Code Status Full Code   Disposition From: home  Expected Disposition: home  Estimated Date of Discharge: unclear  Patient requires continued admission due to    Surrogate Decision Maker/ POA       Personally reviewed Lab Studies and Imaging         Drugs that require monitoring for toxicity include IV pain medication method of monitoring vitals.  Patient getting TPN BMP monitored        Subjective:     Chief Complaint: Nausea vomiting    Agustina Fried is a 64 y.o. female who presents with duodenal obstruction    Patient complaining of a lot of pain.  Poor historian  G-tube +    Review of Systems:      Pertinent positives and negatives discussed in HPI    Objective:     Intake/Output Summary (Last 24 hours) at 8/13/2024 1014  Last data filed at 8/13/2024 0243  Gross per 24 hour   Intake 1630 ml   Output 1725 ml   Net -95 ml      Vitals:   Vitals:    08/12/24 1850 08/12/24 2020 08/12/24 2312 08/13/24 0925   BP: (!) 108/50 136/78 (!) 152/64 (!) 128/52   Pulse: 59 66 73 68   Resp: 16 16 16 17   Temp: 97.6 °F (36.4 °C) 97.4 °F (36.3 °C) 98.7 °F (37.1 °C) 98 °F (36.7 °C)   TempSrc: Oral Axillary Oral Axillary   SpO2: 93% 100% 90% 99%   Weight:       Height:             Physical Exam:      General: Thin built ill looking   Eyes: EOMI  ENT: neck supple  Cardiovascular: Regular rate.  Respiratory: Clear to auscultation  Gastrointestinal: Soft, non tender  Genitourinary: no suprapubic tenderness  Musculoskeletal: No edema  Skin: warm, dry  Neuro: Alert.  Psych:  8/5/2024  No dictation       CBC:   Recent Labs     08/11/24  0547 08/12/24  0452   WBC 7.6 6.5   HGB 7.6* 7.1*    137     BMP:    Recent Labs     08/12/24  0452 08/12/24  1028 08/13/24  0455    131* 138   K 3.7 see below 3.9    103 109   CO2 22 22 22   BUN 27* 25* 32*   CREATININE 1.2 1.2 1.4*   GLUCOSE 178* see below 154*     Hepatic:   Recent Labs     08/12/24  0452   AST 12*   ALT <5*   BILITOT <0.2   ALKPHOS 134*     Lipids:   Lab Results   Component Value Date/Time    CHOL 154 11/27/2023 07:00 PM    HDL 75 11/27/2023 07:00 PM    TRIG 20 08/11/2024 05:30 AM     Hemoglobin A1C:   Lab Results   Component Value Date/Time    LABA1C 5.4 07/30/2024 10:03 AM     TSH:   Lab Results   Component Value Date/Time    TSH 0.86 12/11/2022 04:45 AM     Troponin: No results found for: \"TROPONINT\"  Lactic Acid: No results for input(s): \"LACTA\" in the last 72 hours.  BNP: No results for input(s): \"PROBNP\" in the last 72 hours.  UA:  Lab Results   Component Value Date/Time    NITRU Negative 07/31/2024 04:34 AM    COLORU Yellow 07/31/2024 04:34 AM    PHUR 6.0 07/31/2024 04:34 AM    PHUR 5.5 04/18/2024 04:40 PM    WBCUA 21-50 07/31/2024 04:34 AM    RBCUA 5-10 07/31/2024 04:34 AM    YEAST Present 12/10/2023 02:53 PM    BACTERIA 4+ 07/31/2024 04:34 AM    CLARITYU SL CLOUDY 07/31/2024 04:34 AM    SPECGRAV 1.010 01/06/2013 12:11 AM    LEUKOCYTESUR TRACE 07/31/2024 04:34 AM    UROBILINOGEN 0.2 07/31/2024 04:34 AM    BILIRUBINUR Negative 07/31/2024 04:34 AM    BLOODU MODERATE 07/31/2024 04:34 AM    GLUCOSEU Negative 07/31/2024 04:34 AM    GLUCOSEU >=1000 mg/dL 06/07/2010 03:38 PM    KETUA Negative 07/31/2024 04:34 AM    AMORPHOUS 3+ 04/18/2024 04:40 PM     Urine Cultures:   Lab Results   Component Value Date/Time    LABURIN  07/31/2024 04:34 AM     >100,000 CFU/ml  CONTACT PRECAUTIONS INDICATED  Carbapenem antibiotics are the best option for infections caused  by ESBL producing organisms.  Other antibiotic classes

## 2024-08-13 NOTE — PROGRESS NOTES
[facility] -  Clinical Pharmacy Note    Parenteral Nutrition - Management by Pharmacy    Consult Date(s): 8/4/24  Consulting Provider(s): Dr Butler    Assessment / Plan  1)  Gastric outlet obstruction 2/2 ulcerated mass, ileus - Parenteral Nutrition  Day of Therapy: 10  Formulation:  Clinimix E 5/20  Clinimix Rate:  70 mL/hr (Total volume = 1680 mL/day) - at goal  Lipids:  20% 250mL over 12 hours on Mon & Thurs only (due to national shortage)  Appreciate Clinical Dietitian's recommendations for formulation and goal rate.  Electrolytes:  Clinimix-E includes standard electrolyte formulation.  Recommend further repletion with supplemental IVPBs as needed.  Maintenance IVF:  n/a  Glucose control:    Pt has h/o DM.  Takes Lantus 8 units daily + Humalog 4 units TID with meals at home.  Glucoses ranged 126-180 since TPN bag hung last evening.  Used 0 units high dose SSI (order remains on hold by provider).  Continue with Regular insulin 42 units / day in TPN bag  Will monitor glucoses and adjust insulin in TPN bag as needed.     Add MVI 10 mL/day in PN on Mon-Wed-Fri only (due to national shortage) & Trace Elements 1 mL/day in PN daily.  Daily renal panel, daily magnesium, and weekly triglycerides ordered per protocol.  PN will be re-ordered daily.    Please call with questions--  Thanks--  Valorie Tam, PharmD, BCPS, BCGP  [id number] ([facility])   8/13/2024 9:12 AM      Interval update:  Pt is s/p Dx lap, ex lap, Viviana-en-Y gastrojejunostomy byapss, feeding tube placement, and extensive RADHA (done 8/12).  Remains NPO, venting GT.      Subjective/Objective:   Agustina Fried is a 64 y.o. female with a PMHx significant for CAD, HTN, HLD, HFpEF, T2DM, TIA, CKD stage 3, Wilms tumor s/p remote R nephrectomy, anemia, breast cancer, invasive adenocarcinoma s/p R colectomy (3/2023) bipolar dx, depression, anxiety, gout, hx pancreatitis, who is admitted as a transfer from [facility] with gastric outlet obstruction 2/2 large

## 2024-08-13 NOTE — PROGRESS NOTES
[facility]  Cardiology Inpatient Consult Service  Daily Progress Note        Admit Date:  8/2/2024    Referring Physician: Zahida Koenig MD    Reason for Consultation/Chief Complaint: Preop risk evaluation        Assessment & Plan:     Atherosclerotic heart disease-stable.  Tolerated surgery.  No angina.  Gastric outlet obstruction secondary to adenocarcinoma, status post exploratory laparotomy, Viviana-en-Y gastrojejunostomy bypass, feeding tube placement  No new cardiac recommendations.  Cardiology will follow on an as-needed basis.    Subjective:   Interval history:  No new cardiac complaints    Medications:   albumin human 25%  25 g IntraVENous Q6H    cloNIDine  1 patch TransDERmal Weekly    metroNIDAZOLE  500 mg IntraVENous Q8H    [Held by provider] insulin lispro  0-16 Units SubCUTAneous 4 times per day    pantoprazole (PROTONIX) 40 mg in sodium chloride (PF) 0.9 % 10 mL injection  40 mg IntraVENous Q12H    sodium chloride flush  5-40 mL IntraVENous 2 times per day    enoxaparin  40 mg SubCUTAneous Daily    thiamine  100 mg IntraVENous Daily    vancomycin 500 mg in sodium chloride 0.9% 100 mL enema  500 mg Rectal Q6H    sodium chloride flush  5-40 mL IntraVENous 2 times per day       IV drips:   sodium chloride 100 mL/hr at 08/13/24 1012    PN-Adult Premix 5/20 - Standard Electrolytes - Central Line      sodium chloride      PN-Adult Premix 5/20 - Standard Electrolytes - Central Line 70 mL/hr at 08/12/24 1905    sodium chloride      sodium chloride 10 mL/hr at 08/07/24 2237    sodium chloride 5 mL/hr at 08/04/24 1851    dextrose         PRN:  sodium chloride, sodium chloride, phenol, sodium chloride flush, sodium chloride, HYDROmorphone **OR** HYDROmorphone, sodium chloride flush, sodium chloride, ondansetron **OR** ondansetron, polyethylene glycol, acetaminophen **OR** acetaminophen, glucose, dextrose bolus **OR** dextrose bolus, glucagon (rDNA), dextrose      Objective:

## 2024-08-13 NOTE — PROGRESS NOTES
Ph: [phone], Fax: [phone]           [facility] [facility]               Reason for admission:                 Pain abdomen nausea and vomiting    Brief Summary :     Agustina Fried is being seen by nephrology for CKD       Interval History and plan:     Seen in room  BP is labile but better controlled    Urine is 1125 ml  + Emesis 1000 ml  Creatinine is 1.2 > 1.4  SP extensive surgery yesterday . ( Diagnostic laparoscopy, ex-lap, jose-en-y gastrojejunostomy bypass, feeding tube placement, and extensive lysis of adhesions )      Continue clonidine patch to 0.3 mg q weekly  Albumin X 3 dose  IVF x 10 hrs   GFR is lower post surgery and will give some volume back                      Assessment :       CKD Stage IIIa  Creatinine has been variable in the past she has a history of recurrent SHARRON  Last creatinine was 2 upon discharge from 7/2 hospitalization  she has history of diabetes mellitus hypertension    CHF  Echo (7/2/24)    Limited only for LVEF and RVF.    Image quality is adequate.    Left Ventricle: Normal left ventricular systolic function with a visually estimated EF of 55 - 60%. Normal wall motion.    Right ventricle size is normal. Normal systolic function.    Does not appear volume overloaded      Hypertension        BP goal inpatient 130-140 systolic inpatient        [facility] would like to thank Zahida Koenig MD   for opportunity to serve this patient      Please call with questions at-   24 Hrs Answering service [phone] or  7 am- 5 pm via Perfect serve or cell phone  Dr.Muhammad Dayne Trimble MD       HPI :     Agustina Fried is a 64 y.o. female presented to   the hospital on 8/2/2024 with  pain abdomen nausea and vomiting.  She is known to have nausea vomiting diarrhea for several days because of which she came to the hospital.  She needed NG tube placement in the emergency room.  Found to have severe gastric distention.  She is known to have CKD and has had  IntraVENous, Continuous TPN  0.9 % sodium chloride infusion, , IntraVENous, PRN  cloNIDine (CATAPRES) 0.3 MG/24HR 1 patch, 1 patch, TransDERmal, Weekly  phenol 1.4 % mouth spray 1 spray, 1 spray, Mouth/Throat, Q2H PRN  metroNIDAZOLE (FLAGYL) 500 mg in 0.9% NaCl 100 mL IVPB premix, 500 mg, IntraVENous, Q8H  [Held by provider] insulin lispro (HUMALOG,ADMELOG) injection vial 0-16 Units, 0-16 Units, SubCUTAneous, 4 times per day  pantoprazole (PROTONIX) 40 mg in sodium chloride (PF) 0.9 % 10 mL injection, 40 mg, IntraVENous, Q12H  sodium chloride flush 0.9 % injection 5-40 mL, 5-40 mL, IntraVENous, 2 times per day  sodium chloride flush 0.9 % injection 5-40 mL, 5-40 mL, IntraVENous, PRN  0.9 % sodium chloride infusion, , IntraVENous, PRN  enoxaparin (LOVENOX) injection 40 mg, 40 mg, SubCUTAneous, Daily  thiamine (B-1) injection 100 mg, 100 mg, IntraVENous, Daily  HYDROmorphone (DILAUDID) injection 0.25 mg, 0.25 mg, IntraVENous, Q3H PRN **OR** HYDROmorphone (DILAUDID) injection 0.5 mg, 0.5 mg, IntraVENous, Q3H PRN  vancomycin 500 mg in sodium chloride 0.9% 100 mL enema, 500 mg, Rectal, Q6H  sodium chloride flush 0.9 % injection 5-40 mL, 5-40 mL, IntraVENous, 2 times per day  sodium chloride flush 0.9 % injection 5-40 mL, 5-40 mL, IntraVENous, PRN  0.9 % sodium chloride infusion, , IntraVENous, PRN  ondansetron (ZOFRAN-ODT) disintegrating tablet 4 mg, 4 mg, Oral, Q8H PRN **OR** ondansetron (ZOFRAN) injection 4 mg, 4 mg, IntraVENous, Q6H PRN  polyethylene glycol (GLYCOLAX) packet 17 g, 17 g, Oral, Daily PRN  acetaminophen (TYLENOL) tablet 650 mg, 650 mg, Oral, Q6H PRN **OR** acetaminophen (TYLENOL) suppository 650 mg, 650 mg, Rectal, Q6H PRN  glucose chewable tablet 16 g, 4 tablet, Oral, PRN  dextrose bolus 10% 125 mL, 125 mL, IntraVENous, PRN **OR** dextrose bolus 10% 250 mL, 250 mL, IntraVENous, PRN  glucagon injection 1 mg, 1 mg, SubCUTAneous, PRN  dextrose 10 % infusion, , IntraVENous, Continuous PRN    Vitals :      Vitals:    08/12/24 2312   BP: (!) 152/64   Pulse: 73   Resp: 16   SpO2: 90%          Physical Examination :     Appearance: Alert, awake. NAD.   Respiratory:  No RD on room air.   Cardiovascular: Edema none  Abdomen:  soft  Other relevant findings: NG tube placed.    Labs :     CBC:   Recent Labs     08/11/24  0547 08/12/24  0452   WBC 7.6 6.5   HGB 7.6* 7.1*   HCT 22.9* 22.1*    137     BMP:    Recent Labs     08/11/24  0548 08/12/24  0452 08/12/24  1028 08/13/24  0455   * 138 131* 138   K 3.9 3.7 see below 3.9    107 103 109   CO2 22 22 22 22   BUN 23* 27* 25* 32*   CREATININE 1.2 1.2 1.2 1.4*   GLUCOSE 218* 178* see below 154*   MG 1.70* 2.10  --  1.90   PHOS 2.5 3.0 6.4* 3.0     Lab Results   Component Value Date/Time    COLORU Yellow 07/31/2024 04:34 AM    NITRU Negative 07/31/2024 04:34 AM    GLUCOSEU Negative 07/31/2024 04:34 AM    GLUCOSEU >=1000 mg/dL 06/07/2010 03:38 PM    KETUA Negative 07/31/2024 04:34 AM    UROBILINOGEN 0.2 07/31/2024 04:34 AM    BILIRUBINUR Negative 07/31/2024 04:34 AM        ----------------------------------------------------------  Please call with questions at      24 Hrs Answering service [phone]  Perfect serve, or cell phone 7 am - 5pm  Glen Plascencia MD   [facility]

## 2024-08-13 NOTE — PROGRESS NOTES
General Surgery  Post-operative Note      Procedure(s) Performed: Diagnostic laparoscopy, ex-lap, jose-en-y gastrojejunostomy bypass, feeding tube placement, and extensive lysis of adhesions.     Subjective:   Patient's pain is controlled, denies nausea or vomiting. Resting comfortably in bed. No other complaints reported.     Objective:  Anesthesia type: General      I/O    Intra op     Fluids   mL     EBL < 100 mL     Vitals:   Vitals:    08/12/24 1820 08/12/24 1850 08/12/24 2020 08/12/24 2312   BP: (!) 113/49 (!) 108/50 136/78 (!) 152/64   Pulse: 58 59 66 73   Resp: 12 16 16 16   Temp: 97.7 °F (36.5 °C) 97.6 °F (36.4 °C) 97.4 °F (36.3 °C) 98.7 °F (37.1 °C)   TempSrc:  Oral Axillary Oral   SpO2: 98% 93% 100% 90%   Weight:       Height:           Physical Exam:  Post-op vital signs:  Stable   General appearance: alert, no acute distress, grooming appropriate  Neck: trachea midline,  neck supple  Chest/Lungs: Normal effort with no accessory muscle use on RA  Cardiovascular: RRR,  well perfused  Abdomen: Soft, non-distended, appropriately tender, Dressing over feeding tube c/d/I.   Skin: warm and dry, no rashes  Extremities: no edema, no cyanosis  Neuro: A&Ox3, no focal deficits, sensation intact    Assessment and Plan  This is a 64 y.o. year old female status post diagnostic laparoscopy, ex-lap, jose-en-y gastrojejunostomy bypass, feeding tube placement, and extensive lysis of adhesions secondary to duodenal obstruction. (8/11/24) POD1    Pain management: Dilaudid, Tylenol PRN  Cardiovasc: hemodynamically stable, will continue to monitor  Respiratory:  IS ordered to bedside, encourage hourly IS and deep breathing, wean oxygen as tolerated  Diet: NPO + TPN, vent G-tube, okay to administer meds through G-tube. Cap J tube  : Dooley in placed with clear yellow urine. Will monitor the putput give rising Cr postop. Nephrology following  Ambulation: OOB to chair, encourage ambulation, PT/OT  Prophylaxis: SCDs,  Lovenox  Antibiotics: Flaygyl, Vancomycin  Wound: Local wound care      Ysabel Tao MD PhD  General Surgery Resident  08/13/24  9:04 AM  [phone]

## 2024-08-13 NOTE — CONSULTS
Infectious Diseases Inpatient Consult Note    RESIDENT NOTE - reviewed / edited, Attending note at bottom    Reason for Consult:   Recurrent C diff infection  Requesting Physician:   Zahida Koenig MD   Primary Care Physician:  Viridiana Blanco APRN - NP  History Obtained From:      CHIEF COMPLAINT:   Vomiting, abdominal pain, previous hx of C. Diff.    HISTORY OF PRESENT ILLNESS:      64 year old female with    PMHx: CAD, anxiety, depression, bipolar disorder, breast CA, colon CA, DM, HTN, CKD III, chronic pancreatitis, hx of thyroid nodule, hx of C. 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 Breast lumpectomy (9/2014)  - Rt. 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  Admitted for evaluation for ileus and GOO   No complain of diarrhea  No fever / chills     In ED on 7/30/24 - mild generalized abdominal pain, BP- 152/60, afebrile, WBC- 10.8, Hb-8.1    7/30- CT abdomen pelvis with moderated ileus and severe gastric distension  7/31- UA + for UTI, Urine culture-  Positive E. Coli    8/4- CT abdomen with moderate colitis along the left hemicolon, and 6 cm duodenal necrotic mass    8/12 - OR Viviana-en-Y gastrojejunostomy, J-tube    8/13 - afeb, c/o abd pain related to surgery       Past Medical History:    Past Medical History:   Diagnosis Date    Abnormal brain MRI 7/20/2017    Partially empty sella and minimal chronic small vessel ischemic disease    Acute bilateral low back pain without sciatica 11/2/2016    SHARRON (acute kidney injury) ([facility]) 7/5/2017    Arthritis     back    Bipolar disorder ([facility]) 10/18/2008    CAD (coronary artery disease)     stent placed 6/8/20    Cancer ([facility]) 2015    bilateral breast:s/p lumpectomy/radiation:under care care of breast specialist:Dr. Boone     Carotid stenosis, bilateral:<50%:per US 7/2016 7/15/2016    Carpal tunnel syndrome 10/18/2008    Cervical  iv Metronidazole  Cont enema Vancomycin  No other antimicrobial, important to avoid other antibiotic use as possible     Postop care per Surg    Risk of Complications/Morbidity: High    Illness(es)/ Infection present that pose threat to bodily function.   There is potential for severe exacerbation of infection/side effects of treatment.  Therapy requires intensive monitoring for antimicrobial agent toxicity    Medical Decision Making:  The following items were considered in medical decision making:  Discussion of patient care with other providers  Review / order clinical lab tests  Review / order radiology tests  Review / order other diagnostic tests/interventions  Independent review of radiologic images - reviewed w Radiologist   Microbiology cultures and other micro tests reviewed      Discussed with pt, PCA / Surg Med Student, Radiologist     Deondre Payton MD

## 2024-08-13 NOTE — PROGRESS NOTES
Physical Therapy  Discharge    Pt had abdominal surgery 8/12.   Will need new PT orders due to change in status. Discussed with nursing.  Please reorder if/when appropriate.     Sarika Lambert, PTA #[id number]

## 2024-08-13 NOTE — PROGRESS NOTES
Occupational Therapy  Discharge    Pt has abdominal surgery 8/12. Will need new OT orders for a re-evaluation due to change in status. Discussed with nursing. Please reorder OT if/when appropriate     GINNY Fitzpatrick, OTR/L [id number]

## 2024-08-13 NOTE — PLAN OF CARE
Problem: Safety - Adult  Goal: Free from fall injury  8/13/2024 1807 by Mercedes Jernigan RN  Outcome: Progressing  8/13/2024 1806 by Mercedes Jernigan RN  Outcome: Progressing     Problem: Chronic Conditions and Co-morbidities  Goal: Patient's chronic conditions and co-morbidity symptoms are monitored and maintained or improved  8/13/2024 1807 by Mercedes Jernigan RN  Outcome: Progressing  8/13/2024 1806 by Mercedes Jernigan RN  Outcome: Progressing     Problem: Discharge Planning  Goal: Discharge to home or other facility with appropriate resources  8/13/2024 1807 by Mercedes Jernigan RN  Outcome: Progressing  8/13/2024 1806 by Mercedes Jernigan RN  Outcome: Progressing

## 2024-08-13 NOTE — PROGRESS NOTES
Comprehensive Nutrition Assessment    RECOMMENDATIONS:  PO Diet: Continue NPO;adv per MD  Nutrition Education: Education not indicated   Nutrition Support: Continue TPN  Continue Clinimix 5/20 @ 70 ml/hr  Continue Lipids; 20% 250 ml over 12 hours on M & TH only      NUTRITION ASSESSMENT:   Nutritional summary & status: Follow up. Pt s/p diagnostic laparoscopy, ex-lap, jose-en-y gastrojejunostomy bypass, feeding tube placement, and extensive lysis of adhesions secondary to duodenal obstruction. POD 2. Remains NPO. On Day 10 of TPN. Receiving Clinimix 5/20 @ goal rate of 70 ml/hr. Insulin adj in TPN per pharmacy. Lytes wnl. Vent G- tube, cap J-tube per MD. Continue with current TPN at present, Monitor ability to transition to enteral feedings.   Admission // PMH: Duodenal mass // hypertension, diabetes, GERD, hyperlipidemia and CKD stage IIIb    MALNUTRITION ASSESSMENT  Context of Malnutrition: Chronic Illness   Malnutrition Status: At risk for malnutrition (Comment) (significant wt loss)  Findings of the 6 clinical characteristics of malnutrition (Minimum of 2 out of 6 clinical characteristics is required to make the diagnosis of moderate or severe Protein Calorie Malnutrition based on AND/ASPEN Guidelines):  Energy Intake:  Mild decrease in energy intake (Comment) (prior to admit, timeframe unknown)  Weight Loss:  Greater than 5% over 1 month (9.5% in 1 month)     Body Fat Loss:  Unable to assess     Muscle Mass Loss:  Unable to assess         NUTRITION DIAGNOSIS   Inadequate oral intake related to altered GI function as evidenced by NPO or clear liquid status due to medical condition, nutrition support - parenteral nutrition    Nutrition Monitoring and Evaluation:   Food/Nutrient Intake Outcomes:  Diet Advancement/Tolerance, Parenteral Nutrition Intake/Tolerance  Physical Signs/Symptoms Outcomes:  Biochemical Data, Nutrition Focused Physical Findings, GI Status, Weight     OBJECTIVE DATA: Significant to nutrition

## 2024-08-13 NOTE — PROGRESS NOTES
General Surgery  Post-operative Note      Procedure(s) Performed: Diagnostic laparoscopy, ex-lap, jose-en-y gastrojejunostomy bypass, feeding tube placement, and extensive lysis of adhesions.     Subjective:   Patient's pain is controlled, denies nausea or vomiting. Resting comfortably in bed. No other complaints reported.     Objective:  Anesthesia type: General      I/O    Intra op     Fluids   mL     EBL < 100 mL     Vitals:   Vitals:    08/12/24 1815 08/12/24 1820 08/12/24 1850 08/12/24 2020   BP: (!) 126/113 (!) 113/49 (!) 108/50 136/78   Pulse: 53 58 59 66   Resp: 13 12 16 16   Temp:  97.7 °F (36.5 °C) 97.6 °F (36.4 °C) 97.4 °F (36.3 °C)   TempSrc:   Oral Axillary   SpO2: 96% 98% 93% 100%   Weight:       Height:           Physical Exam:  Post-op vital signs:  Stable   General appearance: alert, no acute distress, grooming appropriate  Neck: trachea midline,  neck supple  Chest/Lungs: Normal effort with no accessory muscle use on RA  Cardiovascular: RRR,  well perfused  Abdomen: Soft, non-distended, appropriately tender, Dressing over feeding tube c/d/I.   Skin: warm and dry, no rashes  Extremities: no edema, no cyanosis  Neuro: A&Ox3, no focal deficits, sensation intact    Assessment and Plan  This is a 64 y.o. year old female status post diagnostic laparoscopy, ex-lap, jose-en-y gastrojejunostomy bypass, feeding tube placement, and extensive lysis of adhesions secondary to duodenal obstruction. (8/11/24) POD0.    Pain management: Dilaudid, Tylenol PRN  Cardiovasc: hemodynamically stable, will continue to monitor  Respiratory:  IS ordered to bedside, encourage hourly IS and deep breathing, wean oxygen as tolerated  Diet: NPO + TPN  : Dooley in placed with clear yellow urine  Ambulation: OOB to chair, encourage ambulation  Prophylaxis: SCDs, Lovenox  Antibiotics: Flaygyl, Vancomycin  Wound: Local wound care      Brigitte Nichols DO  PGY1, General Surgery  08/12/24  10:13 PM  [phone]

## 2024-08-14 LAB
ALBUMIN SERPL-MCNC: 2.2 G/DL (ref 3.4–5)
ALBUMIN SERPL-MCNC: 2.3 G/DL (ref 3.4–5)
ALP SERPL-CCNC: 119 U/L (ref 40–129)
ALT SERPL-CCNC: 9 U/L (ref 10–40)
ANION GAP SERPL CALCULATED.3IONS-SCNC: 8 MMOL/L (ref 3–16)
AST SERPL-CCNC: 20 U/L (ref 15–37)
BASOPHILS # BLD: 0.1 K/UL (ref 0–0.2)
BASOPHILS NFR BLD: 0.9 %
BILIRUB DIRECT SERPL-MCNC: <0.2 MG/DL (ref 0–0.3)
BILIRUB INDIRECT SERPL-MCNC: ABNORMAL MG/DL (ref 0–1)
BILIRUB SERPL-MCNC: 0.4 MG/DL (ref 0–1)
BUN SERPL-MCNC: 37 MG/DL (ref 7–20)
CALCIUM SERPL-MCNC: 7.6 MG/DL (ref 8.3–10.6)
CHLORIDE SERPL-SCNC: 112 MMOL/L (ref 99–110)
CO2 SERPL-SCNC: 19 MMOL/L (ref 21–32)
CREAT SERPL-MCNC: 1.5 MG/DL (ref 0.6–1.2)
DEPRECATED RDW RBC AUTO: 15 % (ref 12.4–15.4)
EOSINOPHIL # BLD: 0.1 K/UL (ref 0–0.6)
EOSINOPHIL NFR BLD: 0.6 %
GFR SERPLBLD CREATININE-BSD FMLA CKD-EPI: 38 ML/MIN/{1.73_M2}
GLUCOSE BLD-MCNC: 101 MG/DL (ref 70–99)
GLUCOSE BLD-MCNC: 125 MG/DL (ref 70–99)
GLUCOSE BLD-MCNC: 62 MG/DL (ref 70–99)
GLUCOSE BLD-MCNC: 83 MG/DL (ref 70–99)
GLUCOSE BLD-MCNC: 95 MG/DL (ref 70–99)
GLUCOSE SERPL-MCNC: 100 MG/DL (ref 70–99)
HCT VFR BLD AUTO: 31.2 % (ref 36–48)
HGB BLD-MCNC: 10.1 G/DL (ref 12–16)
LYMPHOCYTES # BLD: 1.5 K/UL (ref 1–5.1)
LYMPHOCYTES NFR BLD: 11.9 %
MAGNESIUM SERPL-MCNC: 2 MG/DL (ref 1.8–2.4)
MCH RBC QN AUTO: 29.5 PG (ref 26–34)
MCHC RBC AUTO-ENTMCNC: 32.5 G/DL (ref 31–36)
MCV RBC AUTO: 90.7 FL (ref 80–100)
MONOCYTES # BLD: 0.8 K/UL (ref 0–1.3)
MONOCYTES NFR BLD: 6.1 %
NEUTROPHILS # BLD: 9.9 K/UL (ref 1.7–7.7)
NEUTROPHILS NFR BLD: 80.5 %
PERFORMED ON: ABNORMAL
PERFORMED ON: NORMAL
PERFORMED ON: NORMAL
PHOSPHATE SERPL-MCNC: 2.7 MG/DL (ref 2.5–4.9)
PLATELET # BLD AUTO: 137 K/UL (ref 135–450)
PMV BLD AUTO: 9.4 FL (ref 5–10.5)
POTASSIUM SERPL-SCNC: 4.3 MMOL/L (ref 3.5–5.1)
PROT SERPL-MCNC: 6 G/DL (ref 6.4–8.2)
RBC # BLD AUTO: 3.44 M/UL (ref 4–5.2)
SODIUM SERPL-SCNC: 139 MMOL/L (ref 136–145)
WBC # BLD AUTO: 12.4 K/UL (ref 4–11)

## 2024-08-14 PROCEDURE — 6360000002 HC RX W HCPCS

## 2024-08-14 PROCEDURE — 2580000003 HC RX 258

## 2024-08-14 PROCEDURE — 80069 RENAL FUNCTION PANEL: CPT

## 2024-08-14 PROCEDURE — 80076 HEPATIC FUNCTION PANEL: CPT

## 2024-08-14 PROCEDURE — 6370000000 HC RX 637 (ALT 250 FOR IP)

## 2024-08-14 PROCEDURE — 99232 SBSQ HOSP IP/OBS MODERATE 35: CPT | Performed by: INTERNAL MEDICINE

## 2024-08-14 PROCEDURE — 83735 ASSAY OF MAGNESIUM: CPT

## 2024-08-14 PROCEDURE — 97168 OT RE-EVAL EST PLAN CARE: CPT

## 2024-08-14 PROCEDURE — 97535 SELF CARE MNGMENT TRAINING: CPT

## 2024-08-14 PROCEDURE — 36415 COLL VENOUS BLD VENIPUNCTURE: CPT

## 2024-08-14 PROCEDURE — 2500000003 HC RX 250 WO HCPCS

## 2024-08-14 PROCEDURE — 1200000000 HC SEMI PRIVATE

## 2024-08-14 PROCEDURE — 85025 COMPLETE CBC W/AUTO DIFF WBC: CPT

## 2024-08-14 RX ADMIN — METRONIDAZOLE 500 MG: 500 INJECTION, SOLUTION INTRAVENOUS at 23:31

## 2024-08-14 RX ADMIN — VANCOMYCIN HYDROCHLORIDE 500 MG: 1 INJECTION, POWDER, LYOPHILIZED, FOR SOLUTION INTRAVENOUS at 23:35

## 2024-08-14 RX ADMIN — ALTEPLASE 1 MG: 2.2 INJECTION, POWDER, LYOPHILIZED, FOR SOLUTION INTRAVENOUS at 05:35

## 2024-08-14 RX ADMIN — VANCOMYCIN HYDROCHLORIDE 500 MG: 1 INJECTION, POWDER, LYOPHILIZED, FOR SOLUTION INTRAVENOUS at 18:50

## 2024-08-14 RX ADMIN — Medication 125 ML: at 00:32

## 2024-08-14 RX ADMIN — SODIUM CHLORIDE, PRESERVATIVE FREE 40 MG: 5 INJECTION INTRAVENOUS at 10:23

## 2024-08-14 RX ADMIN — SODIUM CHLORIDE, PRESERVATIVE FREE 10 ML: 5 INJECTION INTRAVENOUS at 10:25

## 2024-08-14 RX ADMIN — SODIUM CHLORIDE, PRESERVATIVE FREE 10 ML: 5 INJECTION INTRAVENOUS at 10:26

## 2024-08-14 RX ADMIN — HYDROMORPHONE HYDROCHLORIDE 0.5 MG: 1 INJECTION, SOLUTION INTRAMUSCULAR; INTRAVENOUS; SUBCUTANEOUS at 15:06

## 2024-08-14 RX ADMIN — HYDROMORPHONE HYDROCHLORIDE 0.5 MG: 1 INJECTION, SOLUTION INTRAMUSCULAR; INTRAVENOUS; SUBCUTANEOUS at 11:57

## 2024-08-14 RX ADMIN — METRONIDAZOLE 500 MG: 500 INJECTION, SOLUTION INTRAVENOUS at 06:15

## 2024-08-14 RX ADMIN — SODIUM CHLORIDE, PRESERVATIVE FREE 10 ML: 5 INJECTION INTRAVENOUS at 20:17

## 2024-08-14 RX ADMIN — ENOXAPARIN SODIUM 40 MG: 100 INJECTION SUBCUTANEOUS at 10:22

## 2024-08-14 RX ADMIN — HYDROMORPHONE HYDROCHLORIDE 0.5 MG: 1 INJECTION, SOLUTION INTRAMUSCULAR; INTRAVENOUS; SUBCUTANEOUS at 07:36

## 2024-08-14 RX ADMIN — SODIUM CHLORIDE, PRESERVATIVE FREE 40 MG: 5 INJECTION INTRAVENOUS at 23:40

## 2024-08-14 RX ADMIN — ASCORBIC ACID, VITAMIN A PALMITATE, CHOLECALCIFEROL, THIAMINE HYDROCHLORIDE, RIBOFLAVIN-5 PHOSPHATE SODIUM, PYRIDOXINE HYDROCHLORIDE, NIACINAMIDE, DEXPANTHENOL, ALPHA-TOCOPHEROL ACETATE, VITAMIN K1, FOLIC ACID, BIOTIN, CYANOCOBALAMIN: 200; 3300; 200; 6; 3.6; 6; 40; 15; 10; 150; 600; 60; 5 INJECTION, SOLUTION INTRAVENOUS at 20:11

## 2024-08-14 RX ADMIN — THIAMINE HYDROCHLORIDE 100 MG: 100 INJECTION, SOLUTION INTRAMUSCULAR; INTRAVENOUS at 10:23

## 2024-08-14 RX ADMIN — VANCOMYCIN HYDROCHLORIDE 500 MG: 1 INJECTION, POWDER, LYOPHILIZED, FOR SOLUTION INTRAVENOUS at 13:10

## 2024-08-14 RX ADMIN — VANCOMYCIN HYDROCHLORIDE 500 MG: 1 INJECTION, POWDER, LYOPHILIZED, FOR SOLUTION INTRAVENOUS at 00:03

## 2024-08-14 RX ADMIN — METRONIDAZOLE 500 MG: 500 INJECTION, SOLUTION INTRAVENOUS at 15:18

## 2024-08-14 RX ADMIN — HYDROMORPHONE HYDROCHLORIDE 0.5 MG: 1 INJECTION, SOLUTION INTRAMUSCULAR; INTRAVENOUS; SUBCUTANEOUS at 20:11

## 2024-08-14 RX ADMIN — VANCOMYCIN HYDROCHLORIDE 500 MG: 1 INJECTION, POWDER, LYOPHILIZED, FOR SOLUTION INTRAVENOUS at 06:44

## 2024-08-14 ASSESSMENT — PAIN SCALES - GENERAL
PAINLEVEL_OUTOF10: 8
PAINLEVEL_OUTOF10: 9

## 2024-08-14 ASSESSMENT — PAIN DESCRIPTION - DESCRIPTORS
DESCRIPTORS: ACHING
DESCRIPTORS: ACHING;CRAMPING
DESCRIPTORS: ACHING
DESCRIPTORS: ACHING

## 2024-08-14 ASSESSMENT — PAIN DESCRIPTION - ORIENTATION
ORIENTATION: MID

## 2024-08-14 ASSESSMENT — PAIN DESCRIPTION - LOCATION
LOCATION: ABDOMEN

## 2024-08-14 NOTE — PROGRESS NOTES
General Surgery  Post-operative Note      Procedure(s) Performed: Diagnostic laparoscopy, ex-lap, jose-en-y gastrojejunostomy bypass, feeding tube placement, and extensive lysis of adhesions.     Subjective:   Patient's pain is controlled, denies nausea or vomiting. More participatory with IS and walked in halls las night. Reports 1 loose bowel movement overnight.     Objective:    Vitals:   Vitals:    08/13/24 2002 08/13/24 2225 08/13/24 2323 08/14/24 0254   BP: (!) 152/92  (!) 159/63 (!) 132/56   Pulse: 64  63 68   Resp: 17 18     Temp: 99.4 °F (37.4 °C)  98.3 °F (36.8 °C) 99 °F (37.2 °C)   TempSrc: Oral  Oral Oral   SpO2: 99%  99% 97%   Weight:       Height:           Physical Exam:  General appearance: alert, no acute distress  Chest/Lungs: Normal effort with no accessory muscle use  Cardiovascular: RR,  well perfused  Abdomen: Soft, non-distended, appropriately tender. G tube to petersen bag, j clamped  Skin: warm and dry, no rashes  Extremities: no edema, no cyanosis  Neuro: A&Ox3, no focal deficits, sensation intact    Assessment and Plan  This is a 64 y.o. year old female status post diagnostic laparoscopy, ex-lap, jose-en-y gastrojejunostomy bypass, feeding tube placement, and extensive lysis of adhesions secondary to duodenal obstruction. (8/11/24)     MMPC  IS ordered to bedside, encourage hourly IS and deep breathing  NPO + TPN, G-tube to grav, okay to administer meds through G-tube, clamp and return to gravity.   Start trickle feeds this am at 10 ml/hr through the J tube  Flush both ports each shift to prevent clogging  Petersen in place  OOB, ambulate in halls    Carla Wagner MD  PGY4, General Surgery  08/14/24  6:24 AM  [employer]  [phone]

## 2024-08-14 NOTE — PROGRESS NOTES
Palliative Care Chart Review  and Check in Note:     NAME:  Agustina Fried  Admit Date: 8/2/2024  Hospital Day:  Hospital Day: 13   Current Code status: Full Code    Palliative care is continuing to following Ms. Fried for symptom management,  and goals of care discussion as needed. Patient's chart reviewed today 8/14/24.      Saw pt at the bedside. She denied complaints. She does not know why she is here in the hospital and lacks insight into her medical conditions. No visitors present.     Called her son Maurice for updates. Discussed again that pt does not seem to understand her medical condition. I reinforced that he or another family member will need to attend outpatient appointments with her. He reported that he would attend \"some\" of her appointments. I explained that someone would need to attend all of her appointments with her for the time being due to her memory issues. I suspect this is why she has not been compliant with care in the past as well. He agreed.       The following are the currently established goals/code status, and Symptom management.     Goals of care: Per pt/family goals are rehabilitative and life prolonging.     Code status: Full Code    Discharge plan: Return to [facility] when medically ready for discharge      SARTHAK Sandoval CNP  08/14/24  2:22 PM

## 2024-08-14 NOTE — CONSULTS
Nutrition Assessment for Enteral Feeding    RECOMMENDATION:    PO diet: NPO  Nutrition Support:  A. EN:  Start: Glucerna 1.5 (Diabetic ) @ 10mL/hr  Advance: As tolerated, increase by 10 mL/hr q 8 hours  Goal: 40 mL/hr  Water Flushes: 30ml q4 (Maintenance)  B. TPN:   Continue Clinimix 5/20 @ 70 ml/hr, wean as TF advances  D/c lipids with TF advancement    NUTRITION ASSESSMENT:   Nutritional summary & status: Consult for TF recommnedations, provider to manage. S/p ex-lap, jose-en-y gastrojejunostomy bypass, feeding tube placement, and extensive lysis of adhesions secondary to duodenal obstruction 8/11/24. Plan to continue TPN and start trophic feeds today to assess tolerance and stimulate gut. TF  recommendation is a diabetic formula given intermittent hyperglycemia and A1c from earlier this month 10. Mindful of decreased protein needs with CKD3a, increased protein needs from PI and recent complex abd surgery.     Admission/PMH: Duodenal mass // hypertension, diabetes, GERD, hyperlipidemia and CKD stage IIIb    MALNUTRITION ASSESSMENT  Chronic Illness  Malnutrition Status: At risk for malnutrition (significant wt loss)  Findings of the 6 clinical characteristics of malnutrition:  Energy Intake:  Mild decrease in energy intake (prior to admit, timeframe unknown)  Weight Loss:  Greater than 5% over 1 month (9.5% in 1 month)     Body Fat Loss:  Unable to assess     Muscle Mass Loss:  Unable to assess      CURRENT NUTRITION THERAPIES  Diet NPO  PN-Adult Premix 5/20 - Standard Electrolytes - Central Line  PN-Adult Premix 5/20 - Standard Electrolytes - Central Line  ADULT TUBE FEEDING; PEG/J; Standard with Fiber; Continuous; 10; No; 30; Q 4 hours  Current Tube Feeding (TF) Orders:  Feeding Route: Jejunostomy  Formula: Diabetic  Schedule: Continuous  Additives/Modulars: None  Water Flushes: 30 q4  Goal TF & Flush Orders Provides: Glucerna 1.5 @ 40 ml/hr provides 1440 kcal, 960 TV, 729 FW, 80 g protein  Current Parenteral  Nutrition Orders:  Type and Formula: 2-in-1 Standard   Lipids: 250ml, Two times weekly  Duration: Continuous  Goal PN Orders Provides: Clinimix 5/20 @ 70 ml/hr [id number] kcal, 84 g protein, 1680 TV. GIR = 4.0  PO Intake: NPO   PO Supplement Intake:NPO  Additional Sources of Calories/IVF:NS @ 100 ml/hr     COMPARATIVE STANDARDS  Energy (kcal):  1265-0635     Protein (g):  64-70 (1.1-1.2 g/kg CBW)       Fluid (mL/day):  1 ml/kcal    Jossie Mullins RD  [place]:  [phone]  Office:  [phone]

## 2024-08-14 NOTE — PROGRESS NOTES
V2.0    [facility] Progress Note      Name:  Agustina Fried /Age/Sex: 1960  (64 y.o. female)   MRN & CSN:  [id number] & [id number] Encounter Date/Time: 2024 9:46 AM EDT   Location:  5319/5319-01 PCP: Viridiana Blanco APRN - NP     Attending:Zahida Koenig MD       Hospital Day: 13    Assessment and Recommendations   Agustina Fried is a 64 y.o. female with pmh of CVA, depression, anxiety, diabetes, hypertension, heart failure, CHF, renal, breast cancer, colon cancer who presents with nausea vomiting and rectal bleeding.  CT showed severe gastric distention.  She underwent a EGD which showed duodenal mass resulting in obstruction.  Patient was transferred to [facility] for surgery oncology input.  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- Ct TPN diet per surgery   -s/p diagnostic laparoscopy with exploratory laparotomy gastrojejunal bypass with feeding tube placement and lysis of additions on 2024   -CT showed a 6 cm area of necrotic mass along the duodenum which abuts the hepatic flexure of the colon  -EGD showed duodenal compression with a large mass obstructing that could not be bypassed with the scope  -Colonoscopy on  showed bleeding mass, suggestive external growth into the colon  -Pathology showed high-grade adenocarcinoma  -GI oncology surgery consulted  -Continue decompression  -Nutrition through TPN    Hypertension-blood pressure improved, still on higher side  -Continue clonidine patch       Possible refeeding syndrome- improved   -Replace electrolytes aggressively    Elevated ALP  -Prior MRI showed mild intra and extrahepatic biliary dilatation with abrupt tapering of the CBD and pancreatic duct dilatation  -Patient never followed up for EUS    CKD stage IIIa-creatinine starting to trend    HGB 7.1* 10.1*    137     BMP:    Recent Labs     08/12/24  1028 08/13/24  0455 08/14/24  0644   * 138 139   K see below 3.9 4.3    109 112*   CO2 22 22 19*   BUN 25* 32* 37*   CREATININE 1.2 1.4* 1.5*   GLUCOSE see below 154* 100*     Hepatic:   Recent Labs     08/12/24  0452 08/14/24  0644   AST 12* 20   ALT <5* 9*   BILITOT <0.2 0.4   ALKPHOS 134* 119     Lipids:   Lab Results   Component Value Date/Time    CHOL 154 11/27/2023 07:00 PM    HDL 75 11/27/2023 07:00 PM    TRIG 20 08/11/2024 05:30 AM     Hemoglobin A1C:   Lab Results   Component Value Date/Time    LABA1C 5.4 07/30/2024 10:03 AM     TSH:   Lab Results   Component Value Date/Time    TSH 0.86 12/11/2022 04:45 AM     Troponin: No results found for: \"TROPONINT\"  Lactic Acid: No results for input(s): \"LACTA\" in the last 72 hours.  BNP: No results for input(s): \"PROBNP\" in the last 72 hours.  UA:  Lab Results   Component Value Date/Time    NITRU Negative 07/31/2024 04:34 AM    COLORU Yellow 07/31/2024 04:34 AM    PHUR 6.0 07/31/2024 04:34 AM    PHUR 5.5 04/18/2024 04:40 PM    WBCUA 21-50 07/31/2024 04:34 AM    RBCUA 5-10 07/31/2024 04:34 AM    YEAST Present 12/10/2023 02:53 PM    BACTERIA 4+ 07/31/2024 04:34 AM    CLARITYU SL CLOUDY 07/31/2024 04:34 AM    SPECGRAV 1.010 01/06/2013 12:11 AM    LEUKOCYTESUR TRACE 07/31/2024 04:34 AM    UROBILINOGEN 0.2 07/31/2024 04:34 AM    BILIRUBINUR Negative 07/31/2024 04:34 AM    BLOODU MODERATE 07/31/2024 04:34 AM    GLUCOSEU Negative 07/31/2024 04:34 AM    GLUCOSEU >=1000 mg/dL 06/07/2010 03:38 PM    KETUA Negative 07/31/2024 04:34 AM    AMORPHOUS 3+ 04/18/2024 04:40 PM     Urine Cultures:   Lab Results   Component Value Date/Time    LABURIN  07/31/2024 04:34 AM     >100,000 CFU/ml  CONTACT PRECAUTIONS INDICATED  Carbapenem antibiotics are the best option for infections caused  by ESBL producing organisms.  Other antibiotic classes are  likely to result in treatment failure, even for

## 2024-08-14 NOTE — PROGRESS NOTES
Ph: [phone], Fax: [phone]           [facility]               Reason for admission:                 Pain abdomen nausea and vomiting    Brief Summary :     Agustina Fried is being seen by nephrology for CKD       Interval History and plan:     Seen in room  BP is labile but better controlled    Urine is 1600 ml  + Emesis 400 ml  Creatinine is 1.2 > 1.4  SP extensive surgery yesterday . ( Diagnostic laparoscopy, ex-lap, jose-en-y gastrojejunostomy bypass, feeding tube placement, and extensive lysis of adhesions )      Continue clonidine patch to 0.3 mg q weekly  Labs pending from today and will orders renal today   She was given albumin and IVF yesterday   ID consult reviewed                      Assessment :       CKD Stage IIIa  Creatinine has been variable in the past she has a history of recurrent SHARRON  Last creatinine was 2 upon discharge from 7/2 hospitalization  she has history of diabetes mellitus hypertension    CHF  Echo (7/2/24)    Limited only for LVEF and RVF.    Image quality is adequate.    Left Ventricle: Normal left ventricular systolic function with a visually estimated EF of 55 - 60%. Normal wall motion.    Right ventricle size is normal. Normal systolic function.    Does not appear volume overloaded      Hypertension   BP: (132-159)/(56-63)  Pulse:  [63-68]   BP goal inpatient 130-140 systolic inpatient        [facility] would like to thank Zahida Koenig MD   for opportunity to serve this patient      Please call with questions at-   24 Hrs Answering service [phone] or  7 am- 5 pm via Perfect [employer] or cell phone  Dr.Muhammad Dayne Trimble MD       HPI :     Agustina Fried is a 64 y.o. female presented to   the hospital on 8/2/2024 with  pain abdomen nausea and vomiting.  She is known to have nausea vomiting diarrhea for several days because of which she came to the hospital.  She needed NG tube placement in the emergency room.  Found to have severe  gastric distention.  She is known to have CKD and has had multiple hospitalization in the past        PMH/PSH/SH/Family History:     Past Medical History:   Diagnosis Date    Abnormal brain MRI 7/20/2017    Partially empty sella and minimal chronic small vessel ischemic disease    Acute bilateral low back pain without sciatica 11/2/2016    SHARRON (acute kidney injury) ([facility]) 7/5/2017    Arthritis     back    Bipolar disorder ([facility]) 10/18/2008    CAD (coronary artery disease)     stent placed 6/8/20    Cancer ([facility]) 2015    bilateral breast:s/p lumpectomy/radiation:under care care of breast specialist:Dr. Boone     Carotid stenosis, bilateral:<50%:per US 7/2016 7/15/2016    Carpal tunnel syndrome 10/18/2008    Cervical cancer screening 2014    Nml per pt'.    Coronary artery disease of native artery of native heart with stable angina pectoris ([facility]) 6/9/2020    DDD (degenerative disc disease), lumbar 7/18/2018    Depression     under care of pschiatrist:Dr. 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 myelopathy or radiculopathy 3/10/2017    Transient cerebral ischemia 07/15/2016    TIA:7/10/16    Unspecified cerebral artery occlusion with cerebral infarction     TIA          Medication:     Current Facility-Administered Medications: PN-Adult Premix 5/20 - Standard Electrolytes - Central Line, ,  IntraVENous, Continuous TPN  0.9 % sodium chloride infusion, , IntraVENous, PRN  0.9 % sodium chloride infusion, , IntraVENous, PRN  cloNIDine (CATAPRES) 0.3 MG/24HR 1 patch, 1 patch, TransDERmal, Weekly  phenol 1.4 % mouth spray 1 spray, 1 spray, Mouth/Throat, Q2H PRN  metroNIDAZOLE (FLAGYL) 500 mg in 0.9% NaCl 100 mL IVPB premix, 500 mg, IntraVENous, Q8H  [Held by provider] insulin lispro (HUMALOG,ADMELOG) injection vial 0-16 Units, 0-16 Units, SubCUTAneous, 4 times per day  pantoprazole (PROTONIX) 40 mg in sodium chloride (PF) 0.9 % 10 mL injection, 40 mg, IntraVENous, Q12H  sodium chloride flush 0.9 % injection 5-40 mL, 5-40 mL, IntraVENous, 2 times per day  sodium chloride flush 0.9 % injection 5-40 mL, 5-40 mL, IntraVENous, PRN  0.9 % sodium chloride infusion, , IntraVENous, PRN  enoxaparin (LOVENOX) injection 40 mg, 40 mg, SubCUTAneous, Daily  thiamine (B-1) injection 100 mg, 100 mg, IntraVENous, Daily  HYDROmorphone (DILAUDID) injection 0.25 mg, 0.25 mg, IntraVENous, Q3H PRN **OR** HYDROmorphone (DILAUDID) injection 0.5 mg, 0.5 mg, IntraVENous, Q3H PRN  vancomycin 500 mg in sodium chloride 0.9% 100 mL enema, 500 mg, Rectal, Q6H  sodium chloride flush 0.9 % injection 5-40 mL, 5-40 mL, IntraVENous, 2 times per day  sodium chloride flush 0.9 % injection 5-40 mL, 5-40 mL, IntraVENous, PRN  0.9 % sodium chloride infusion, , IntraVENous, PRN  ondansetron (ZOFRAN-ODT) disintegrating tablet 4 mg, 4 mg, Oral, Q8H PRN **OR** ondansetron (ZOFRAN) injection 4 mg, 4 mg, IntraVENous, Q6H PRN  polyethylene glycol (GLYCOLAX) packet 17 g, 17 g, Oral, Daily PRN  acetaminophen (TYLENOL) tablet 650 mg, 650 mg, Oral, Q6H PRN **OR** acetaminophen (TYLENOL) suppository 650 mg, 650 mg, Rectal, Q6H PRN  glucose chewable tablet 16 g, 4 tablet, Oral, PRN  dextrose bolus 10% 125 mL, 125 mL, IntraVENous, PRN **OR** dextrose bolus 10% 250 mL, 250 mL, IntraVENous, PRN  glucagon injection 1 mg, 1 mg, SubCUTAneous, PRN  dextrose 10 %  infusion, , IntraVENous, Continuous PRN    Vitals :     Vitals:    08/14/24 0254   BP: (!) 132/56   Pulse: 68   Resp:    Temp: 99 °F (37.2 °C)   SpO2: 97%          Physical Examination :     Appearance: Alert, awake. NAD.   Respiratory:  No RD on room air.   Cardiovascular: Edema none  Abdomen:  soft  Other relevant findings: NG tube placed.    Labs :     CBC:   Recent Labs     08/12/24 0452   WBC 6.5   HGB 7.1*   HCT 22.1*        BMP:    Recent Labs     08/12/24  0452 08/12/24  1028 08/13/24  0455    131* 138   K 3.7 see below 3.9    103 109   CO2 22 22 22   BUN 27* 25* 32*   CREATININE 1.2 1.2 1.4*   GLUCOSE 178* see below 154*   MG 2.10  --  1.90   PHOS 3.0 6.4* 3.0     Lab Results   Component Value Date/Time    COLORU Yellow 07/31/2024 04:34 AM    NITRU Negative 07/31/2024 04:34 AM    GLUCOSEU Negative 07/31/2024 04:34 AM    GLUCOSEU >=1000 mg/dL 06/07/2010 03:38 PM    KETUA Negative 07/31/2024 04:34 AM    UROBILINOGEN 0.2 07/31/2024 04:34 AM    BILIRUBINUR Negative 07/31/2024 04:34 AM        ----------------------------------------------------------  Please call with questions at      24 Hrs Answering service [phone]  Perfect [employer], or cell phone 7 am - 5pm  Glen Plascencia MD   [facility]       No

## 2024-08-14 NOTE — PROGRESS NOTES
Physical Therapy  Attempt Note/No Treatment  Pt refused PT at this time d/t pain. Will follow up 8/15[place] [address]

## 2024-08-14 NOTE — PROGRESS NOTES
[facility] -  Clinical Pharmacy Note    Parenteral Nutrition - Management by Pharmacy    Consult Date(s): 8/4/24  Consulting Provider(s): Dr Butler    Assessment / Plan  1)  Gastric outlet obstruction 2/2 ulcerated mass, ileus - Parenteral Nutrition  Day of Therapy: 11  Formulation:  Clinimix E 5/20  Clinimix Rate:  70 mL/hr (Total volume = 1680 mL/day) - at goal  Lipids:  20% 250mL over 12 hours on Mon & Thurs only (due to national shortage)  Appreciate Clinical Dietitian's recommendations for formulation and goal rate.  Electrolytes:  Clinimix-E includes standard electrolyte formulation.  Recommend further repletion with supplemental IVPBs as needed.  Maintenance IVF:  n/a  Glucose control:    Pt has h/o DM.  Takes Lantus 8 units daily + Humalog 4 units TID with meals at home.  Glucoses ranged  since TPN bag hung last evening.  Used 0 units high dose SSI (order remains on hold by provider).  TF starting via JT at 10 mL/hr today per notes.  To prevent hypoglycemia, will decrease Regular insulin to 40 units / day in TPN bag this evening.    Will monitor glucoses and adjust insulin in TPN bag as needed.     Add MVI 10 mL/day in PN on Mon-Wed-Fri only (due to national shortage) & Trace Elements 1 mL/day in PN daily.  Daily renal panel, daily magnesium, and weekly triglycerides ordered per protocol.  PN will be re-ordered daily.    Please call with questions--  Thanks--  Valorie Tam, PharmD, BCPS, BCGP  [id number] ([facility])   8/14/2024 8:44 AM      Interval update:   Remains NPO with GT to gravity; on TPN.  Per notes, planning to start TF via JT at 10mL/hr today.    Subjective/Objective:   Agustina Fried is a 64 y.o. female with a PMHx significant for CAD, HTN, HLD, HFpEF, T2DM, TIA, CKD stage 3, Wilms tumor s/p remote R nephrectomy, anemia, breast cancer, invasive adenocarcinoma s/p R colectomy (3/2023) bipolar dx, depression, anxiety, gout, hx pancreatitis, who is admitted as a transfer from Health system  with gastric outlet obstruction 2/2 large ulcerated mass in duodenum.  PICC placed and TPN started 8/4/24.  Pt is s/p Dx lap, ex lap, Viviana-en-Y gastrojejunostomy byapss, feeding tube placement, and extensive RADHA (done 8/12).      Pharmacy is consulted to manage parenteral nutrition.    Ht Readings from Last 1 Encounters:   08/02/24 1.6 m (5' 2.99\")     Wt Readings from Last 1 Encounters:   08/09/24 77.6 kg (171 lb)     Recent Labs     08/13/24  0455 08/14/24  0644    139   K 3.9 4.3    112*   CO2 22 19*   PHOS 3.0 2.7   GLUCOSE 154* 100*   BUN 32* 37*   CREATININE 1.4* 1.5*   CALCIUM 7.2* 7.6*   MG 1.90 2.00     Albumin   Date Value Ref Range Status   08/14/2024 2.3 (L) 3.4 - 5.0 g/dL Final   08/14/2024 2.2 (L) 3.4 - 5.0 g/dL Final     Corrected Calcium: 8.3 mg/dL    Recent Labs     08/13/24  1837 08/14/24  0000 08/14/24  0109 08/14/24  0701   POCGLU 81 62* 95 83     Sliding scale insulin used since PN bag hung yesterday: 0 units (held by provider)    Recent Labs     08/12/24  0452 08/14/24  0644   WBC 6.5 12.4*   HGB 7.1* 10.1*    137     Triglycerides   Date Value Ref Range Status   08/11/2024 20 0 - 150 mg/dL Final   08/06/2024 88 0 - 150 mg/dL Final   08/05/2024 91 0 - 150 mg/dL Final   08/04/2024 70 0 - 150 mg/dL Final   11/27/2023 89 0 - 150 mg/dL Final

## 2024-08-14 NOTE — PROGRESS NOTES
mcg/mL   cefTRIAXone Resistant >=64 mcg/mL   cefuroxime Resistant >=64 mcg/mL   ciprofloxacin Resistant >=4 mcg/mL   ertapenem Sensitive <=0.12 mcg/mL   gentamicin Sensitive <=1 mcg/mL   levofloxacin Resistant >=8 mcg/mL   meropenem Sensitive <=0.25 mcg/mL   nitrofurantoin Sensitive <=16 mcg/mL   piperacillin-tazobactam Sensitive <=4 mcg/mL   trimethoprim-sulfamethoxazole Resistant >=320 mcg/mL         Imagin24 CT OF THE ABDOMEN AND PELVIS WITHOUT CONTRAST   IMPRESSION:  Moderate ileus with severe gastric distension.    24 CT OF THE CHEST WITHOUT CONTRAST   Trace bilateral pleural effusions.     Patchy ground-glass opacity within the left lower lobe with superimposed micro nodularity; correlate for pneumonitis.  Metastatic disease cannot be entirely excluded; continued attention on follow-up is suggested.     Atherosclerosis, including coronary artery calcification.     1.5 cm hypodense right thyroid nodule, for which follow-up recommendations are as below.     Mild fibrosis about the periphery of the upper lobes.     Chronic heterogeneous attenuation of thoracic and cervical vertebral bodies, as well as sternum, which could reflect metabolic bone disease or chronic metastatic disease.    24  CT ABDOMEN PELVIS W WO CONTRAST   IMPRESSION:  1.  Interval development of moderate colitis along the left hemicolon which may represent C. difficile colitis.  2.  Reported duodenal mass is not well evaluated with an ill-defined 6 cm area of heterogeneous necrotic mass or abscess along the second portion of the duodenum with moderate duodenal luminal narrowing. This abuts the hepatic flexure of the colon and may represent sequelae of malignancy or infection with possible fistula or abscess formation, not well evaluated. Recommend correlation with EGD.      Scheduled Meds:   cloNIDine  1 patch TransDERmal Weekly    metroNIDAZOLE  500 mg IntraVENous Q8H    [Held by provider] insulin lispro  0-16 Units  SubCUTAneous 4 times per day    pantoprazole (PROTONIX) 40 mg in sodium chloride (PF) 0.9 % 10 mL injection  40 mg IntraVENous Q12H    sodium chloride flush  5-40 mL IntraVENous 2 times per day    enoxaparin  40 mg SubCUTAneous Daily    thiamine  100 mg IntraVENous Daily    vancomycin 500 mg in sodium chloride 0.9% 100 mL enema  500 mg Rectal Q6H    sodium chloride flush  5-40 mL IntraVENous 2 times per day       Continuous Infusions:   PN-Adult Premix 5/20 - Standard Electrolytes - Central Line      PN-Adult Premix 5/20 - Standard Electrolytes - Central Line 70 mL/hr at 08/13/24 1847    sodium chloride      sodium chloride      sodium chloride 10 mL/hr at 08/07/24 2237    sodium chloride 5 mL/hr at 08/04/24 1851    dextrose         PRN Meds:  sodium chloride, sodium chloride, phenol, sodium chloride flush, sodium chloride, HYDROmorphone **OR** HYDROmorphone, sodium chloride flush, sodium chloride, ondansetron **OR** ondansetron, polyethylene glycol, acetaminophen **OR** acetaminophen, glucose, dextrose bolus **OR** dextrose bolus, glucagon (rDNA), dextrose      Assessment:     Hx psych d/o, breast ca, CAD, R nephrectomy  Hx C diff     Presents with GI sx   Admit 7/30 with [facility]  UA tr LE, micro 21-50 WBC  CT / EGD - duodenal mass causing obstruction  C diff poss  OR 8/12 resection tumor (gastroduodenectomy with reanastomosis, J-tube)     IMP/  UTI vs bacteriuria      C diff + by PCR,    + diarrhea   + colitis on CT, extending to rectosigmoid colon - reviewed w Radiologist, Dr Robison       Plan:     Cont iv Metronidazole  Cont enema Vancomycin  Start enteral antibiotics when GI tract working    No other antimicrobial, important to avoid other antibiotic use as possible      Postop care per Surg    Medical Decision Making:  The following items were considered in medical decision making:  Discussion of patient care with other providers  Reviewed clinical lab tests  Reviewed radiology tests  Reviewed other diagnostic  tests/interventions  Independent review of radiologic images  Microbiology cultures and other micro tests reviewed      Discussed with pt    Deondre Payton MD

## 2024-08-14 NOTE — PLAN OF CARE
Problem: Safety - Adult  Goal: Free from fall injury  8/14/2024 0226 by Su Kirkland RN  Outcome: Progressing  8/13/2024 1807 by Mercedes Jernigan RN  Outcome: Progressing  8/13/2024 1806 by Mercedes Jernigan RN  Outcome: Progressing     Problem: Chronic Conditions and Co-morbidities  Goal: Patient's chronic conditions and co-morbidity symptoms are monitored and maintained or improved  8/14/2024 0226 by Su Kirkland RN  Outcome: Progressing  8/13/2024 1807 by Mercedes Jernigan RN  Outcome: Progressing  8/13/2024 1806 by Mercedes Jernigan RN  Outcome: Progressing     Problem: Discharge Planning  Goal: Discharge to home or other facility with appropriate resources  8/14/2024 0226 by Su Kirkland RN  Outcome: Progressing  8/13/2024 1807 by Mercedes Jernigan RN  Outcome: Progressing  8/13/2024 1806 by Mercedes Jernigan RN  Outcome: Progressing     Problem: ABCDS Injury Assessment  Goal: Absence of physical injury  Outcome: Progressing     Problem: Nutrition Deficit:  Goal: Optimize nutritional status  Outcome: Progressing     Problem: Skin/Tissue Integrity  Goal: Absence of new skin breakdown  Description: 1.  Monitor for areas of redness and/or skin breakdown  2.  Assess vascular access sites hourly  3.  Every 4-6 hours minimum:  Change oxygen saturation probe site  4.  Every 4-6 hours:  If on nasal continuous positive airway pressure, respiratory therapy assess nares and determine need for appliance change or resting period.  Outcome: Progressing     Problem: Pain  Goal: Verbalizes/displays adequate comfort level or baseline comfort level  Outcome: Progressing

## 2024-08-14 NOTE — PROGRESS NOTES
Occupational Therapy  Facility/Department: [facility]  Occupational Therapy Re-Evaluation/Treatment     Name: Agustina Fried  : 1960  MRN: [id number]  Date of Service: 2024    Discharge Recommendations:  Subacute/Skilled Nursing Facility  OT Equipment Recommendations  Equipment Needed: No  Other: defer       Patient Diagnosis(es): Diagnoses of Small bowel obstruction (HCC), Rectal bleeding, and Duodenal obstruction were pertinent to this visit.  Past Medical History:  has a past medical history of Abnormal brain MRI, Acute bilateral low back pain without sciatica, SHARRON (acute kidney injury) (HCC), Arthritis, Bipolar disorder (HCC), CAD (coronary artery disease), Cancer (HCC), Carotid stenosis, bilateral:<50%:per [place] 2016, Carpal tunnel syndrome, Cervical cancer screening, Coronary artery disease of native artery of native heart with stable angina pectoris (HCC), DDD (degenerative disc disease), lumbar, Depression, Depression/anxiety, Depression/anxiety, Diabetes mellitus (HCC), Gout, History of mammogram, History of therapeutic radiation, Hyperlipidemia, Hypertension, 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  Past Surgical History:  has a past surgical history that includes Kidney removal; Hysterectomy; Breast lumpectomy (); Tubal ligation; other surgical history (Right); Cardiac catheterization (2020); Upper gastrointestinal endoscopy (N/A, 2021); Colonoscopy (N/A, 2021); CT BIOPSY BONE MARROW (2/3/2021); Temporal Artery Biopsy (Right, 2021); Upper gastrointestinal endoscopy (N/A, 12/15/2022); Colonoscopy (N/A,  ESBL Ecoli UTI treated with meropenem. She presented to the ED 7/30 with  nausea, vomiting, diarrhea , abd pain and rectal bleeding. CT showed ileus with severe gastric distension. She was seen by general surgery and had NG. She was admitted to [facility]. Hb dropped from 8.1 to 6.9. She was seen by GI who did an EGD that showed duodenal malignancy resulting in obstruction . CT chest showed Patchy ground-glass opacity within the left lower lobe with superimposed micronodularity; correlate for pneumonitis.  Metastatic disease cannot be entirely excluded. Chronic heterogeneous attenuation of thoracic and cervical vertebral bodies, as well as sternum, which could reflect metabolic bone disease or chronic metastatic disease. Pt and family decided on aggressive care. Pt was transferred here for surgery-oncology input.  Family / Caregiver Present: No  Referring Practitioner: Ysabel Tao MD  Diagnosis: Duodenal mass  Subjective  Subjective: Pt in bed upon arrival and agreeable to OT session. Pt reporting 10/10 \"abdominal pain\"- RN notified and aware     Social/Functional History  Social/Functional History  Lives With: Alone  Type of Home: Apartment  Home Layout: One level  Home Access:  (3 flights of stairs to get to apartment)  Bathroom Shower/Tub: Tub/Shower unit  Bathroom Toilet: Standard  Bathroom Equipment: Tub transfer bench  Has the patient had two or more falls in the past year or any fall with injury in the past year?: Yes (a couple falls, Pt reported none since previous admission in july)  ADL Assistance: Independent  Homemaking Responsibilities: No  Ambulation Assistance: Independent  Transfer Assistance: Independent  Active : No  Patient's  Info: uses a cab to get to appointments and a friend to go to the grocery store  Occupation: Retired  Additional Comments: Pt from home but was at SNF since last admission at beginning of month. Pt reports at rehab was mostly IND with ADLs

## 2024-08-14 NOTE — PROGRESS NOTES
This RN was going to initiate tube feeds per orders. Dietician paulettes Wilbur 1.5, call placed to dietary for TF. Electronically signed by Belinda Singh RN on 8/14/2024 at 5:38 PM

## 2024-08-14 NOTE — CARE COORDINATION
Patient admitted from [facility] and would like to return there at d/c for rehab. She will need precert to return and cannot be on TPN at d/c.     Trophic feeds started today. Per ID will switch to PO at d/c.     Electronically signed by Elaina Rutherford RN on 8/14/2024 at 4:07 PM  [phone]

## 2024-08-15 ENCOUNTER — APPOINTMENT (OUTPATIENT)
Dept: GENERAL RADIOLOGY | Age: 64
End: 2024-08-15
Attending: SURGERY
Payer: MEDICAID

## 2024-08-15 LAB
ALBUMIN SERPL-MCNC: 2.5 G/DL (ref 3.4–5)
ANION GAP SERPL CALCULATED.3IONS-SCNC: 8 MMOL/L (ref 3–16)
BASOPHILS # BLD: 0.1 K/UL (ref 0–0.2)
BASOPHILS NFR BLD: 0.9 %
BUN SERPL-MCNC: 37 MG/DL (ref 7–20)
CALCIUM SERPL-MCNC: 7.9 MG/DL (ref 8.3–10.6)
CHLORIDE SERPL-SCNC: 111 MMOL/L (ref 99–110)
CO2 SERPL-SCNC: 18 MMOL/L (ref 21–32)
CREAT SERPL-MCNC: 1.6 MG/DL (ref 0.6–1.2)
DEPRECATED RDW RBC AUTO: 15.2 % (ref 12.4–15.4)
EOSINOPHIL # BLD: 0.1 K/UL (ref 0–0.6)
EOSINOPHIL NFR BLD: 1 %
GFR SERPLBLD CREATININE-BSD FMLA CKD-EPI: 35 ML/MIN/{1.73_M2}
GLUCOSE BLD-MCNC: 162 MG/DL (ref 70–99)
GLUCOSE BLD-MCNC: 214 MG/DL (ref 70–99)
GLUCOSE BLD-MCNC: 285 MG/DL (ref 70–99)
GLUCOSE SERPL-MCNC: 154 MG/DL (ref 70–99)
HCT VFR BLD AUTO: 33.2 % (ref 36–48)
HGB BLD-MCNC: 10.7 G/DL (ref 12–16)
LYMPHOCYTES # BLD: 1.8 K/UL (ref 1–5.1)
LYMPHOCYTES NFR BLD: 13.1 %
MAGNESIUM SERPL-MCNC: 2.1 MG/DL (ref 1.8–2.4)
MCH RBC QN AUTO: 29.3 PG (ref 26–34)
MCHC RBC AUTO-ENTMCNC: 32.2 G/DL (ref 31–36)
MCV RBC AUTO: 91.1 FL (ref 80–100)
MONOCYTES # BLD: 0.7 K/UL (ref 0–1.3)
MONOCYTES NFR BLD: 5.1 %
NEUTROPHILS # BLD: 10.8 K/UL (ref 1.7–7.7)
NEUTROPHILS NFR BLD: 79.9 %
PERFORMED ON: ABNORMAL
PHOSPHATE SERPL-MCNC: 3 MG/DL (ref 2.5–4.9)
PLATELET # BLD AUTO: 162 K/UL (ref 135–450)
PMV BLD AUTO: 9.5 FL (ref 5–10.5)
POTASSIUM SERPL-SCNC: 4.5 MMOL/L (ref 3.5–5.1)
RBC # BLD AUTO: 3.65 M/UL (ref 4–5.2)
SODIUM SERPL-SCNC: 137 MMOL/L (ref 136–145)
TRIGL SERPL-MCNC: 36 MG/DL (ref 0–150)
WBC # BLD AUTO: 13.5 K/UL (ref 4–11)

## 2024-08-15 PROCEDURE — 6360000002 HC RX W HCPCS

## 2024-08-15 PROCEDURE — 2500000003 HC RX 250 WO HCPCS

## 2024-08-15 PROCEDURE — 6370000000 HC RX 637 (ALT 250 FOR IP)

## 2024-08-15 PROCEDURE — 99232 SBSQ HOSP IP/OBS MODERATE 35: CPT | Performed by: INTERNAL MEDICINE

## 2024-08-15 PROCEDURE — 2580000003 HC RX 258

## 2024-08-15 PROCEDURE — 2580000003 HC RX 258: Performed by: INTERNAL MEDICINE

## 2024-08-15 PROCEDURE — 74018 RADEX ABDOMEN 1 VIEW: CPT

## 2024-08-15 PROCEDURE — 97530 THERAPEUTIC ACTIVITIES: CPT

## 2024-08-15 PROCEDURE — 6370000000 HC RX 637 (ALT 250 FOR IP): Performed by: INTERNAL MEDICINE

## 2024-08-15 PROCEDURE — 80069 RENAL FUNCTION PANEL: CPT

## 2024-08-15 PROCEDURE — 84478 ASSAY OF TRIGLYCERIDES: CPT

## 2024-08-15 PROCEDURE — 1200000000 HC SEMI PRIVATE

## 2024-08-15 PROCEDURE — 97535 SELF CARE MNGMENT TRAINING: CPT

## 2024-08-15 PROCEDURE — 97110 THERAPEUTIC EXERCISES: CPT

## 2024-08-15 PROCEDURE — 83735 ASSAY OF MAGNESIUM: CPT

## 2024-08-15 PROCEDURE — 85025 COMPLETE CBC W/AUTO DIFF WBC: CPT

## 2024-08-15 PROCEDURE — 97116 GAIT TRAINING THERAPY: CPT

## 2024-08-15 RX ORDER — PRAZOSIN HYDROCHLORIDE 2 MG/1
2 CAPSULE ORAL NIGHTLY
Status: ON HOLD | COMMUNITY
End: 2024-08-19 | Stop reason: CLARIF

## 2024-08-15 RX ORDER — VANCOMYCIN HYDROCHLORIDE 50 MG/ML
250 KIT ORAL EVERY 6 HOURS SCHEDULED
Status: DISCONTINUED | OUTPATIENT
Start: 2024-08-15 | End: 2024-08-19

## 2024-08-15 RX ORDER — CETIRIZINE HYDROCHLORIDE 10 MG/1
10 TABLET ORAL DAILY
Status: ON HOLD | COMMUNITY
End: 2024-08-19 | Stop reason: CLARIF

## 2024-08-15 RX ORDER — METRONIDAZOLE 500 MG/100ML
500 INJECTION, SOLUTION INTRAVENOUS EVERY 8 HOURS
Status: DISCONTINUED | OUTPATIENT
Start: 2024-08-15 | End: 2024-08-21

## 2024-08-15 RX ORDER — AMLODIPINE BESYLATE 5 MG/1
5 TABLET ORAL DAILY
Status: DISCONTINUED | OUTPATIENT
Start: 2024-08-15 | End: 2024-08-15

## 2024-08-15 RX ORDER — PIOGLITAZONEHYDROCHLORIDE 30 MG/1
30 TABLET ORAL DAILY
Status: ON HOLD | COMMUNITY
End: 2024-08-19 | Stop reason: CLARIF

## 2024-08-15 RX ORDER — GABAPENTIN 100 MG/1
200 CAPSULE ORAL 2 TIMES DAILY
Status: ON HOLD | COMMUNITY
End: 2024-08-22 | Stop reason: HOSPADM

## 2024-08-15 RX ORDER — NIFEDIPINE 60 MG/1
60 TABLET, EXTENDED RELEASE ORAL DAILY
Status: DISCONTINUED | OUTPATIENT
Start: 2024-08-15 | End: 2024-08-16

## 2024-08-15 RX ORDER — ATORVASTATIN CALCIUM 40 MG/1
40 TABLET, FILM COATED ORAL NIGHTLY
Status: DISCONTINUED | OUTPATIENT
Start: 2024-08-15 | End: 2024-08-22 | Stop reason: HOSPADM

## 2024-08-15 RX ORDER — CLONAZEPAM 0.5 MG/1
0.5 TABLET ORAL NIGHTLY PRN
Status: ON HOLD | COMMUNITY
End: 2024-08-19 | Stop reason: CLARIF

## 2024-08-15 RX ORDER — VANCOMYCIN HYDROCHLORIDE 125 MG/1
125 CAPSULE ORAL 4 TIMES DAILY
Status: DISCONTINUED | OUTPATIENT
Start: 2024-08-15 | End: 2024-08-15

## 2024-08-15 RX ORDER — TRAZODONE HYDROCHLORIDE 100 MG/1
100 TABLET ORAL NIGHTLY
Status: DISCONTINUED | OUTPATIENT
Start: 2024-08-15 | End: 2024-08-22 | Stop reason: HOSPADM

## 2024-08-15 RX ORDER — GABAPENTIN 100 MG/1
200 CAPSULE ORAL 2 TIMES DAILY
Status: DISCONTINUED | OUTPATIENT
Start: 2024-08-15 | End: 2024-08-22 | Stop reason: HOSPADM

## 2024-08-15 RX ORDER — DESVENLAFAXINE 25 MG/1
25 TABLET, EXTENDED RELEASE ORAL DAILY
Status: ON HOLD | COMMUNITY
End: 2024-08-19 | Stop reason: CLARIF

## 2024-08-15 RX ORDER — SODIUM CHLORIDE 9 MG/ML
INJECTION, SOLUTION INTRAVENOUS CONTINUOUS
Status: ACTIVE | OUTPATIENT
Start: 2024-08-15 | End: 2024-08-16

## 2024-08-15 RX ORDER — CLOPIDOGREL BISULFATE 75 MG/1
75 TABLET ORAL DAILY
Status: ON HOLD | COMMUNITY
End: 2024-08-19 | Stop reason: CLARIF

## 2024-08-15 RX ORDER — DEUTETRABENAZINE 30 MG/1
30 TABLET, FILM COATED, EXTENDED RELEASE ORAL DAILY
Status: ON HOLD | COMMUNITY
End: 2024-08-19 | Stop reason: CLARIF

## 2024-08-15 RX ADMIN — METRONIDAZOLE 500 MG: 500 INJECTION, SOLUTION INTRAVENOUS at 22:53

## 2024-08-15 RX ADMIN — SODIUM CHLORIDE, PRESERVATIVE FREE 40 MG: 5 INJECTION INTRAVENOUS at 20:25

## 2024-08-15 RX ADMIN — SODIUM CHLORIDE, PRESERVATIVE FREE 10 ML: 5 INJECTION INTRAVENOUS at 20:29

## 2024-08-15 RX ADMIN — SODIUM CHLORIDE, PRESERVATIVE FREE 10 ML: 5 INJECTION INTRAVENOUS at 09:20

## 2024-08-15 RX ADMIN — HYDROMORPHONE HYDROCHLORIDE 0.5 MG: 1 INJECTION, SOLUTION INTRAMUSCULAR; INTRAVENOUS; SUBCUTANEOUS at 20:22

## 2024-08-15 RX ADMIN — SODIUM CHLORIDE, PRESERVATIVE FREE 40 MG: 5 INJECTION INTRAVENOUS at 09:19

## 2024-08-15 RX ADMIN — HYDROMORPHONE HYDROCHLORIDE 0.5 MG: 1 INJECTION, SOLUTION INTRAMUSCULAR; INTRAVENOUS; SUBCUTANEOUS at 14:44

## 2024-08-15 RX ADMIN — TRAZODONE HYDROCHLORIDE 100 MG: 100 TABLET ORAL at 20:29

## 2024-08-15 RX ADMIN — ATORVASTATIN CALCIUM 40 MG: 40 TABLET, FILM COATED ORAL at 20:29

## 2024-08-15 RX ADMIN — GABAPENTIN 200 MG: 100 CAPSULE ORAL at 09:19

## 2024-08-15 RX ADMIN — GABAPENTIN 200 MG: 100 CAPSULE ORAL at 20:29

## 2024-08-15 RX ADMIN — VANCOMYCIN HYDROCHLORIDE 250 MG: 250 POWDER, FOR SOLUTION ORAL at 18:00

## 2024-08-15 RX ADMIN — SODIUM CHLORIDE: 9 INJECTION, SOLUTION INTRAVENOUS at 22:52

## 2024-08-15 RX ADMIN — THIAMINE HYDROCHLORIDE 100 MG: 100 INJECTION, SOLUTION INTRAMUSCULAR; INTRAVENOUS at 09:19

## 2024-08-15 RX ADMIN — VANCOMYCIN HYDROCHLORIDE 125 MG: 125 CAPSULE ORAL at 11:38

## 2024-08-15 RX ADMIN — HYDROMORPHONE HYDROCHLORIDE 0.5 MG: 1 INJECTION, SOLUTION INTRAMUSCULAR; INTRAVENOUS; SUBCUTANEOUS at 00:30

## 2024-08-15 RX ADMIN — NIFEDIPINE 60 MG: 60 TABLET, EXTENDED RELEASE ORAL at 09:19

## 2024-08-15 RX ADMIN — SODIUM CHLORIDE: 9 INJECTION, SOLUTION INTRAVENOUS at 09:14

## 2024-08-15 RX ADMIN — METRONIDAZOLE 500 MG: 500 INJECTION, SOLUTION INTRAVENOUS at 07:15

## 2024-08-15 RX ADMIN — ENOXAPARIN SODIUM 40 MG: 100 INJECTION SUBCUTANEOUS at 09:20

## 2024-08-15 RX ADMIN — SODIUM CHLORIDE, PRESERVATIVE FREE 10 ML: 5 INJECTION INTRAVENOUS at 20:28

## 2024-08-15 RX ADMIN — I.V. FAT EMULSION 250 ML: 20 EMULSION INTRAVENOUS at 16:59

## 2024-08-15 RX ADMIN — TRACE ELEMENTS INJECTION 4: 7.4; .75; 98; 151 INJECTION, SOLUTION INTRAVENOUS at 16:59

## 2024-08-15 RX ADMIN — METRONIDAZOLE 500 MG: 500 INJECTION, SOLUTION INTRAVENOUS at 14:45

## 2024-08-15 RX ADMIN — SODIUM CHLORIDE: 9 INJECTION, SOLUTION INTRAVENOUS at 21:38

## 2024-08-15 ASSESSMENT — PAIN SCALES - GENERAL
PAINLEVEL_OUTOF10: 10
PAINLEVEL_OUTOF10: 9
PAINLEVEL_OUTOF10: 9
PAINLEVEL_OUTOF10: 3

## 2024-08-15 ASSESSMENT — PAIN DESCRIPTION - ORIENTATION
ORIENTATION: ANTERIOR
ORIENTATION: ANTERIOR

## 2024-08-15 ASSESSMENT — PAIN DESCRIPTION - FREQUENCY: FREQUENCY: INTERMITTENT

## 2024-08-15 ASSESSMENT — PAIN DESCRIPTION - PAIN TYPE: TYPE: SURGICAL PAIN

## 2024-08-15 ASSESSMENT — PAIN DESCRIPTION - DESCRIPTORS
DESCRIPTORS: ACHING
DESCRIPTORS: ACHING

## 2024-08-15 ASSESSMENT — PAIN DESCRIPTION - LOCATION
LOCATION: ABDOMEN
LOCATION: ABDOMEN

## 2024-08-15 ASSESSMENT — PAIN DESCRIPTION - ONSET: ONSET: ON-GOING

## 2024-08-15 NOTE — PROGRESS NOTES
V2.0    [facility] Progress Note      Name:  Agustina Fried /Age/Sex: 1960  (64 y.o. female)   MRN & CSN:  [id number] & [id number] Encounter Date/Time: 8/15/2024 9:46 AM EDT   Location:  5319/5319-01 PCP: Viridiana Blanco APRN - NP     Attending:Zahida Koenig MD       Hospital Day: 14    Assessment and Recommendations   Agustina Fried is a 64 y.o. female with pmh of CVA, depression, anxiety, diabetes, hypertension, heart failure, CHF, renal, breast cancer, colon cancer who presents with nausea vomiting and rectal bleeding.  CT showed severe gastric distention.  She underwent a EGD which showed duodenal mass resulting in obstruction.  Patient was transferred to [facility] for surgery oncology input.  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- Ct TPN diet per surgery , started on low dose tube feeds  -s/p diagnostic laparoscopy with exploratory laparotomy gastrojejunal bypass with feeding tube placement and lysis of additions on 2024   -CT showed a 6 cm area of necrotic mass along the duodenum which abuts the hepatic flexure of the colon  -EGD showed duodenal compression with a large mass obstructing that could not be bypassed with the scope  -Colonoscopy on  showed bleeding mass, suggestive external growth into the colon  -Pathology showed high-grade adenocarcinoma  -GI oncology surgery consulted  -Continue decompression  -Nutrition through TPN    Hypertension-blood pressure improved, still on higher side  -Continue clonidine patch       Possible refeeding syndrome- improved   -Replace electrolytes aggressively    Elevated ALP  -Prior MRI showed mild intra and extrahepatic biliary dilatation with abrupt tapering of the CBD and pancreatic duct dilatation  -Patient never followed up for EUS    CKD stage  tube tip is in the left upper quadrant, projection of the body of the stomach.     Nasogastric tube tip in the stomach. Electronically signed by Lito Ellison MD    EGD    Result Date: 8/5/2024  No dictation       CBC:   Recent Labs     08/14/24  0644 08/15/24  0719   WBC 12.4* 13.5*   HGB 10.1* 10.7*    162     BMP:    Recent Labs     08/13/24  0455 08/14/24  0644 08/15/24  0710    139 137   K 3.9 4.3 4.5    112* 111*   CO2 22 19* 18*   BUN 32* 37* 37*   CREATININE 1.4* 1.5* 1.6*   GLUCOSE 154* 100* 154*     Hepatic:   Recent Labs     08/14/24  0644   AST 20   ALT 9*   BILITOT 0.4   ALKPHOS 119     Lipids:   Lab Results   Component Value Date/Time    CHOL 154 11/27/2023 07:00 PM    HDL 75 11/27/2023 07:00 PM    TRIG 20 08/11/2024 05:30 AM     Hemoglobin A1C:   Lab Results   Component Value Date/Time    LABA1C 5.4 07/30/2024 10:03 AM     TSH:   Lab Results   Component Value Date/Time    TSH 0.86 12/11/2022 04:45 AM     Troponin: No results found for: \"TROPONINT\"  Lactic Acid: No results for input(s): \"LACTA\" in the last 72 hours.  BNP: No results for input(s): \"PROBNP\" in the last 72 hours.  UA:  Lab Results   Component Value Date/Time    NITRU Negative 07/31/2024 04:34 AM    COLORU Yellow 07/31/2024 04:34 AM    PHUR 6.0 07/31/2024 04:34 AM    PHUR 5.5 04/18/2024 04:40 PM    WBCUA 21-50 07/31/2024 04:34 AM    RBCUA 5-10 07/31/2024 04:34 AM    YEAST Present 12/10/2023 02:53 PM    BACTERIA 4+ 07/31/2024 04:34 AM    CLARITYU SL CLOUDY 07/31/2024 04:34 AM    SPECGRAV 1.010 01/06/2013 12:11 AM    LEUKOCYTESUR TRACE 07/31/2024 04:34 AM    UROBILINOGEN 0.2 07/31/2024 04:34 AM    BILIRUBINUR Negative 07/31/2024 04:34 AM    BLOODU MODERATE 07/31/2024 04:34 AM    GLUCOSEU Negative 07/31/2024 04:34 AM    GLUCOSEU >=1000 mg/dL 06/07/2010 03:38 PM    KETUA Negative 07/31/2024 04:34 AM    AMORPHOUS 3+ 04/18/2024 04:40 PM     Urine Cultures:   Lab Results   Component Value Date/Time    LABURIN  07/31/2024 04:34 AM

## 2024-08-15 NOTE — PROGRESS NOTES
ONCOLOGY HEMATOLOGY CARE PROGRESS NOTE      SUBJECTIVE:    No family at bedside. Patient c/o diarrhea. Continues to demonstrate poor insight in to her condition.       OBJECTIVE        Physical    VITALS:  Patient Vitals for the past 24 hrs:   BP Temp Temp src Pulse Resp SpO2   08/15/24 0912 (!) 150/87 98 °F (36.7 °C) Oral 64 16 99 %   08/15/24 0338 (!) 162/65 98.6 °F (37 °C) Oral 62 -- 100 %   08/14/24 2345 (!) 151/63 97.9 °F (36.6 °C) Oral 61 16 99 %   08/14/24 2130 (!) 147/65 -- -- -- -- --   08/14/24 2011 (!) 175/68 98.2 °F (36.8 °C) Oral 80 16 94 %   08/14/24 1552 (!) 141/62 98.2 °F (36.8 °C) Oral 60 18 94 %   08/14/24 1139 (!) 157/71 99 °F (37.2 °C) Oral 61 18 99 %       24HR INTAKE/OUTPUT:    Intake/Output Summary (Last 24 hours) at 8/15/2024 1101  Last data filed at 8/15/2024 0914  Gross per 24 hour   Intake 0 ml   Output 350 ml   Net -350 ml       DATA:  CBC:    Recent Labs     08/15/24  0719 08/14/24  0644 08/12/24  0452 08/11/24  0547 08/05/24  0547 08/04/24  1051   WBC 13.5* 12.4* 6.5 7.6   < > 9.0   NEUTROABS 10.8* 9.9* 4.3  --   --  7.1   LYMPHOPCT 13.1 11.9 20.7  --   --  14.1   RBC 3.65* 3.44* 2.47* 2.58*   < > 3.53*   HGB 10.7* 10.1* 7.1* 7.6*   < > 10.2*   HCT 33.2* 31.2* 22.1* 22.9*   < > 32.1*   MCV 91.1 90.7 89.4 88.7   < > 91.2   MCH 29.3 29.5 28.7 29.3   < > 29.0   MCHC 32.2 32.5 32.1 33.0   < > 31.9   RDW 15.2 15.0 14.4 14.9   < > 14.9    137 137 138   < > 209    < > = values in this interval not displayed.       PT/INR:    Recent Labs     08/12/24  0452 08/04/24  1051   INR 1.30* 1.09     PTT:  No results for input(s): \"APTT\" in the last 720 hours.    CMP:    Recent Labs     08/15/24  0710 08/14/24  0644 08/13/24  0455 08/12/24  1028 08/12/24  0452 08/09/24  1910 08/09/24  0655 08/07/24  1720 08/07/24  0445 07/31/24  0550 07/30/24  1003    139 138 131* 138   < > 139   < > 136   < > 138   K 4.5 4.3 3.9 see below 3.7   < > 3.3*   < > 3.6   < >  encephalopathy    Tobacco use disorder    Severe malnutrition (HCC)    Acute right eye pain    Suspected condition    Bipolar affective disorder, currently depressed, moderate (HCC)    Seasonal allergies    Symptomatic bradycardia    Dyspnea    Diffuse pain    Hypoglycemia    Acquired absence of other specified parts of digestive tract    Atherosclerotic heart disease of native coronary artery without angina pectoris    Cognitive communication deficit    Hypertensive heart and chronic kidney disease with heart failure and stage 1 through stage 4 chronic kidney disease, or unspecified chronic kidney disease (HCC)    Muscle weakness (generalized)    Occlusion and stenosis of bilateral carotid arteries    Other lack of coordination    Repeated falls    Unspecified abnormalities of gait and mobility    Acute diarrhea    Hypertensive urgency    Acute renal failure superimposed on chronic kidney disease, unspecified acute renal failure type, unspecified CKD stage (HCC)    Atypical chest pain    Confusion    Chronic kidney disease    C. difficile colitis    Encephalopathy, metabolic    DM (diabetes mellitus), secondary, with complications (HCC)    Hyperosmolar hyperglycemic state (HHS) (HCC)    Acute on chronic heart failure with preserved ejection fraction (HFpEF) (HCC)    Acute distention of stomach    Duodenal mass    Duodenal cancer (HCC)    Small bowel obstruction (HCC)       ASSESSMENT AND PLAN:    Duodenal mass with obstructive symptoms with moderate ileus  -EGD on 8/1/24 -large ulcerated mass in the second portion of the duodenum with food retained in the stomach.  -CT Chest 07/31/2024- - trace bilateral pleural effusions  - patchy ground glass opacity within the LLL with superimposed micro nodularity metastatic disease cannot be entirely excluded  - 1.5 cm hypodense right thyroid nodule.  chronic heterogeneous attenuation of thoracic and cervical vertebral bodies as well as sternum which could reflect metabolic  bone disease or chronic metastatic disease    - she underwent GI workup 8/5/24 which revealed obstructing mass on EGD and bleeding mass present on Cscope. The mucosa around these lesion were well preserved suggesting external growth into the colon rather than vice versa. Multiple biopsies obtained.      Anastomotic mass biopsy: Ulcerated intestinal tissue with at least high-grade dysplasia\adenocarcinoma in situ.      Participated in care conference 8/7/24 and family wants to pursue aggressive treatments  Palliative Care continues to follow patient and ongoing discussions with family   - she is s/p diagnostic laparoscopy, ex-lap, jose-en-y gastrojejunostomy bypass, feeding tube placement, and extensive lysis of adhesions secondary to duodenal obstruction 8/11/24  - omental nodule, liber nodule, and peritoneal nodule all negative for malignancy    History of colon cancer  -Status post hemicolectomy 3/7/2023 with pathology showing invasive adenocarcinoma, moderately diff, all margins negative for invasive carcinoma, 24 benign lymph nodes   - she was following with Dr. Olivares at [facility], last seen 8/2023  -CT CAP was ordered at that time as well but patient did not complete this. Long standing history of noncompliance with multiple providers   -CEA elevated at 50.5    History of Breast carcinoma:  -Diagnosis 9/24/2014  -Right breast  -T1c, N1A, M0, grade 1  -ER positive, IL positive and HER2/edward negative  -Oncotype Dx of 8  -Patient stopped letrozole prematurely due to hot flashes     Wilms tumor  CKD  -Status post surgery at age 8  -Pathology not available  -She states she also had chemotherapy at that time     Anemia  Iron studies more consistent with anemia of chronic disease 7/2024  B12 and Folate -unremarkable    ONCOLOGIC DISPOSITION:  Plan is to DC back to [facility]. She cannot undergo treatment for her cancer while she is in a SNF. Additionally, she will need to make a significant improvement in  her performance status in order to undergo chemotherapy and family will need to demonstrate ability to bring her to her follow up appointments.     Jamila Garrido, SARTHAK - CNP  Please contact through [employer]    Case discussed with Dr. Koenig today

## 2024-08-15 NOTE — PROGRESS NOTES
[facility] -  Clinical Pharmacy Note    Parenteral Nutrition - Management by Pharmacy    Consult Date(s): 8/4/24  Consulting Provider(s): Dr Butler    Assessment / Plan  1)  Gastric outlet obstruction 2/2 ulcerated mass, ileus - Parenteral Nutrition  Day of Therapy: 12  Formulation:  Clinimix E 5/20  Clinimix Rate:  70 mL/hr (Total volume = 1680 mL/day) - at goal  Lipids:  20% 250mL over 12 hours on Mon & Thurs only (due to national shortage)  TF started yesterday at 10mL/hr; will possibly advance to 20mL/hr today (goal 40mL/hr per RD).    Discussed with RD this AM - will continue lipids for now.  Re-assess Monday 8/19 - if tolerating TF closer to goal rate, can then d/c lipids.  Appreciate Clinical Dietitian's recommendations for formulation and goal rate.  Electrolytes:  Clinimix-E includes standard electrolyte formulation.  Recommend further repletion with supplemental IVPBs as needed.  Maintenance IVF:  n/a  Glucose control:    Pt has h/o DM.  Takes Lantus 8 units daily + Humalog 4 units TID with meals at home.  Glucoses ranged 125-162 since TPN bag hung last evening.  Used 0 units high dose SSI (order remains on hold by provider).  TF started via JT at 10 mL/hr yesterday; may advance to 20mL/hr today - may affect glucoses.  Continue Regular insulin at 40 units / day in TPN bag today.    Will monitor glucoses and adjust insulin in TPN bag as needed.     Add MVI 10 mL/day in PN on Mon-Wed-Fri only (due to national shortage) & Trace Elements 1 mL/day in PN daily.  Daily renal panel, daily magnesium, and weekly triglycerides ordered per protocol.  PN will be re-ordered daily.    Please call with questions--  Thanks--  Valorie Tam, PharmD, BCPS, BCGP  [id number] ([facility])   8/15/2024 9:00 AM      Interval update:   Remains NPO with GT to gravity; on TPN.  TF started yesterday - discussing possible advancement to 20mL/hr today per JT.  SCr trending up slightly - nephrology  following.    Subjective/Objective:   Agustina Fried is a 64 y.o. female with a PMHx significant for CAD, HTN, HLD, HFpEF, T2DM, TIA, CKD stage 3, Wilms tumor s/p remote R nephrectomy, anemia, breast cancer, invasive adenocarcinoma s/p R colectomy (3/2023) bipolar dx, depression, anxiety, gout, hx pancreatitis, who is admitted as a transfer from [facility] with gastric outlet obstruction 2/2 large ulcerated mass in duodenum.  PICC placed and TPN started 8/4/24.  Pt is s/p Dx lap, ex lap, Viviana-en-Y gastrojejunostomy byapss, feeding tube placement, and extensive RADHA (done 8/12).      Pharmacy is consulted to manage parenteral nutrition.    Ht Readings from Last 1 Encounters:   08/02/24 1.6 m (5' 2.99\")     Wt Readings from Last 1 Encounters:   08/09/24 77.6 kg (171 lb)     Recent Labs     08/14/24  0644 08/15/24  0710    137   K 4.3 4.5   * 111*   CO2 19* 18*   PHOS 2.7 3.0   GLUCOSE 100* 154*   BUN 37* 37*   CREATININE 1.5* 1.6*   CALCIUM 7.6* 7.9*   MG 2.00 2.10     Albumin   Date Value Ref Range Status   08/15/2024 2.5 (L) 3.4 - 5.0 g/dL Final     Corrected Calcium: 9.1 mg/dL    Recent Labs     08/14/24  0701 08/14/24  1244 08/14/24  2107 08/15/24  0339   POCGLU 83 101* 125* 162*     Sliding scale insulin used since PN bag hung yesterday: 0 units (held by provider)    Recent Labs     08/14/24  0644 08/15/24  0719   WBC 12.4* 13.5*   HGB 10.1* 10.7*    162     Triglycerides   Date Value Ref Range Status   08/11/2024 20 0 - 150 mg/dL Final   08/06/2024 88 0 - 150 mg/dL Final   08/05/2024 91 0 - 150 mg/dL Final   08/04/2024 70 0 - 150 mg/dL Final   11/27/2023 89 0 - 150 mg/dL Final

## 2024-08-15 NOTE — PROGRESS NOTES
ID Follow-up NOTE    CC:   Recurrent C diff infection  Antibiotics: LA Vanco, IV Metronidazole    Admit Date: 8/2/2024  Hospital Day: 14    Subjective:     POD#3, gastroduodenectomy with reanastomosis, J-tube    Patient c/o abd pain. Having diarrhea multiple times overnight and profuse watery two time today morning as per RN. Having abdominal tenderness.    Objective:     Patient Vitals for the past 8 hrs:   BP Temp Temp src Pulse Resp SpO2   08/15/24 0912 (!) 150/87 98 °F (36.7 °C) Oral 64 16 99 %     I/O last 3 completed shifts:  In: 10 [NG/GT:10]  Out: 1400 [Urine:1150; Emesis/NG output:250]  No intake/output data recorded.    EXAM:  GENERAL: No apparent distress. Chronically ill.  HEENT: Membranes moist, no oral lesion  NECK:  Supple, no lymphadenopathy  LUNGS: Clear b/l, no rales, no dullness  CARDIAC: RRR, no murmur appreciated  ABD:  + BS, soft , diffuse tenderness, J-tube  EXT:  No rash, no edema, no lesions  NEURO: No focal neurologic findings  PSYCH: Orientation, sensorium, mood normal  LINES:  R PICC, placed 8/4       Data Review:  Lab Results   Component Value Date    WBC 13.5 (H) 08/15/2024    HGB 10.7 (L) 08/15/2024    HCT 33.2 (L) 08/15/2024    MCV 91.1 08/15/2024     08/15/2024     Lab Results   Component Value Date    CREATININE 1.6 (H) 08/15/2024    BUN 37 (H) 08/15/2024     08/15/2024    K 4.5 08/15/2024     (H) 08/15/2024    CO2 18 (L) 08/15/2024       Hepatic Function Panel:   Lab Results   Component Value Date/Time    ALKPHOS 119 08/14/2024 06:44 AM    ALT 9 08/14/2024 06:44 AM    AST 20 08/14/2024 06:44 AM    BILITOT 0.4 08/14/2024 06:44 AM    BILIDIR <0.2 08/14/2024 06:44 AM    IBILI see below 08/14/2024 06:44 AM       Micro:   - Stool C difficile toxin B gene detected (8/1/24)  - Urine culture positive for E coli (7/31/24)  Antibiotic Interpretation Microscan     ampicillin Resistant >=32 mcg/mL   ampicillin-sulbactam Sensitive 4 mcg/mL   ceFAZolin Resistant >=64 mcg/mL    cefepime Resistant       cefOXitin Sensitive <=4 mcg/mL   cefTRIAXone Resistant >=64 mcg/mL   cefuroxime Resistant >=64 mcg/mL   ciprofloxacin Resistant >=4 mcg/mL   ertapenem Sensitive <=0.12 mcg/mL   gentamicin Sensitive <=1 mcg/mL   levofloxacin Resistant >=8 mcg/mL   meropenem Sensitive <=0.25 mcg/mL   nitrofurantoin Sensitive <=16 mcg/mL   piperacillin-tazobactam Sensitive <=4 mcg/mL   trimethoprim-sulfamethoxazole Resistant >=320 mcg/mL         Imagin24 CT OF THE ABDOMEN AND PELVIS WITHOUT CONTRAST   IMPRESSION:  Moderate ileus with severe gastric distension.    24 CT OF THE CHEST WITHOUT CONTRAST   Trace bilateral pleural effusions.     Patchy ground-glass opacity within the left lower lobe with superimposed micro nodularity; correlate for pneumonitis.  Metastatic disease cannot be entirely excluded; continued attention on follow-up is suggested.     Atherosclerosis, including coronary artery calcification.     1.5 cm hypodense right thyroid nodule, for which follow-up recommendations are as below.     Mild fibrosis about the periphery of the upper lobes.     Chronic heterogeneous attenuation of thoracic and cervical vertebral bodies, as well as sternum, which could reflect metabolic bone disease or chronic metastatic disease.    24  CT ABDOMEN PELVIS W WO CONTRAST   IMPRESSION:  1.  Interval development of moderate colitis along the left hemicolon which may represent C. difficile colitis.  2.  Reported duodenal mass is not well evaluated with an ill-defined 6 cm area of heterogeneous necrotic mass or abscess along the second portion of the duodenum with moderate duodenal luminal narrowing. This abuts the hepatic flexure of the colon and may represent sequelae of malignancy or infection with possible fistula or abscess formation, not well evaluated. Recommend correlation with EGD.      Scheduled Meds:   atorvastatin  40 mg Oral Nightly    gabapentin  200 mg Oral BID    traZODone  100 mg

## 2024-08-15 NOTE — CONSULTS
Nutrition Assessment     RECOMMENDATION:    PO diet: NPO  Nutrition Support:  A. EN:  Modify Formula: Glucerna 1.5 (Diabetic ) @ 20mL/hr  Advance: As tolerated, increase by 10 mL/hr q 8 hours  Goal: 40 mL/hr  Water Flushes: 30ml q4 (Maintenance)  B. TPN:   Continue Clinimix 5/20 @ 70 ml/hr, wean as TF advances  D/c lipids with TF advancement    NUTRITION ASSESSMENT:   Nutritional summary & status: Follow up. Patient s/p GJ placement and tolerating tube feeds via jtube at trophic rate of 20 ml/hr. Noted standard formula ordered however patient would likely benefit from diabetic formula at this time, surgery OK to modify. BG likely elevated with initiation of enteral feeds as TPN continues to run at goal rate of 70 ml/hr to provide 100% of nutrient needs as EN tolerance is assessed. Noted large variance in weights throughout admission, last wt recorded 8/9. RD will order CBW and assess nutrient needs as appropriate. TPN to be weaned as pt tolerates advancement of EN towards goal.     Admission/PMH: Duodenal mass // hypertension, diabetes, GERD, hyperlipidemia and CKD stage IIIb    MALNUTRITION ASSESSMENT  Chronic Illness  Malnutrition Status: At risk for malnutrition (significant wt loss)  Findings of the 6 clinical characteristics of malnutrition:  Energy Intake:  Mild decrease in energy intake (prior to admit, timeframe unknown)  Weight Loss:  Greater than 5% over 1 month (9.5% in 1 month)     Body Fat Loss:  Unable to assess     Muscle Mass Loss:  Unable to assess      CURRENT NUTRITION THERAPIES  Diet NPO  PN-Adult Premix 5/20 - Standard Electrolytes - Central Line  PN-Adult Premix 5/20 - Standard Electrolytes - Central Line  ADULT TUBE FEEDING; PEG/J; Diabetic; Continuous; 20; No; 30; Q 4 hours  Current Tube Feeding (TF) Orders:  Feeding Route: Jejunostomy  Formula: Diabetic  Schedule: Continuous  Additives/Modulars: None  Water Flushes: 30 q4  Goal TF & Flush Orders Provides: Glucerna 1.5 @ 40 ml/hr provides  1440 kcal, 960 TV, 729 FW, 80 g protein  Current Parenteral Nutrition Orders:  Type and Formula: 2-in-1 Standard   Lipids: 250ml, Two times weekly  Duration: Continuous  Goal PN Orders Provides: Clinimix 5/20 @ 70 ml/hr [id number] kcal, 84 g protein, 1680 TV. GIR = 4.0  PO Intake: NPO   PO Supplement Intake:NPO  Additional Sources of Calories/IVF:NS @ 100 ml/hr     COMPARATIVE STANDARDS  Energy (kcal):  8414-9259     Protein (g):  64-70 (1.1-1.2 g/kg CBW)       Fluid (mL/day):  1 ml/kcal    Belinda Riggs RD  [place]:  [phone]  Office:  [phone]

## 2024-08-15 NOTE — PROGRESS NOTES
Ph: [phone], Fax: [phone]           [facility]               Reason for admission:                 Pain abdomen nausea and vomiting    Brief Summary :     Agustina Fried is being seen by nephrology for CKD       Interval History and plan:     Seen in room  BP is labile but better controlled    Urine is 500 ml  + +  Creatinine is 1.2 > 1.4> 1.6  SP extensive surgery  ( Diagnostic laparoscopy, ex-lap, jose-en-y gastrojejunostomy bypass, feeding tube placement, and extensive lysis of adhesions )      Continue clonidine patch to 0.3 mg q weekly  Switch amlodipine with Nifedipine XL  NS IVF x 20 hrs for worsening GFR  Bladder scan to check for retention                        Assessment :       CKD Stage IIIa  Creatinine has been variable in the past she has a history of recurrent SHARRON  Last creatinine was 2 upon discharge from 7/2 hospitalization  she has history of diabetes mellitus hypertension    CHF  Echo (7/2/24)    Limited only for LVEF and RVF.    Image quality is adequate.    Left Ventricle: Normal left ventricular systolic function with a visually estimated EF of 55 - 60%. Normal wall motion.    Right ventricle size is normal. Normal systolic function.    Does not appear volume overloaded      Hypertension   BP: (162)/(65)  Pulse:  [62]   BP goal inpatient 130-140 systolic inpatient        [facility] would like to thank Zahida Koenig MD   for opportunity to serve this patient      Please call with questions at-   24 Hrs Answering service [phone] or  7 am- 5 pm via Perfect serve or cell phone  Dr.Muhammad Dayne Trimble MD       HPI :     Agustina Fried is a 64 y.o. female presented to   the hospital on 8/2/2024 with  pain abdomen nausea and vomiting.  She is known to have nausea vomiting diarrhea for several days because of which she came to the hospital.  She needed NG tube placement in the emergency room.  Found to have severe gastric distention.  She is known to  Oral, PRN  dextrose bolus 10% 125 mL, 125 mL, IntraVENous, PRN **OR** dextrose bolus 10% 250 mL, 250 mL, IntraVENous, PRN  glucagon injection 1 mg, 1 mg, SubCUTAneous, PRN  dextrose 10 % infusion, , IntraVENous, Continuous PRN    Vitals :     Vitals:    08/15/24 0338   BP: (!) 162/65   Pulse: 62   Resp:    Temp: 98.6 °F (37 °C)   SpO2: 100%          Physical Examination :     Appearance: Alert, awake. NAD.   Respiratory:  No RD on room air.   Cardiovascular: Edema none  Abdomen:  soft  Other relevant findings: NG tube placed.    Labs :     CBC:   Recent Labs     08/14/24  0644 08/15/24  0719   WBC 12.4* 13.5*   HGB 10.1* 10.7*   HCT 31.2* 33.2*    162     BMP:    Recent Labs     08/13/24  0455 08/14/24  0644 08/15/24  0710    139 137   K 3.9 4.3 4.5    112* 111*   CO2 22 19* 18*   BUN 32* 37* 37*   CREATININE 1.4* 1.5* 1.6*   GLUCOSE 154* 100* 154*   MG 1.90 2.00 2.10   PHOS 3.0 2.7 3.0     Lab Results   Component Value Date/Time    COLORU Yellow 07/31/2024 04:34 AM    NITRU Negative 07/31/2024 04:34 AM    GLUCOSEU Negative 07/31/2024 04:34 AM    GLUCOSEU >=1000 mg/dL 06/07/2010 03:38 PM    KETUA Negative 07/31/2024 04:34 AM    UROBILINOGEN 0.2 07/31/2024 04:34 AM    BILIRUBINUR Negative 07/31/2024 04:34 AM        ----------------------------------------------------------  Please call with questions at      24 Hrs Answering service [phone]  Perfect serve, or cell phone 7 am - 5pm  Glen Plascencia MD   [facility]

## 2024-08-15 NOTE — PROGRESS NOTES
Physical Therapy  Attempt Note/No Treatment    Pt refused PT at this time, despite encouragement, d/t pain. Will follow [place] [id number]     normal...

## 2024-08-15 NOTE — PROGRESS NOTES
Patient alert and oriented.Patient c/o pain, Dilaudid given per protocol.  GJ tube in place,TF infusing @ 10ml/hr. TPN infusing @70ml/hr. Patient incontinent of bladder and bowel. Patient in bed lowest position call light and bedside table within reach. All needs are met at this time. Patient aware to call if any help needed.Plan of care ongoing.   BP (!) 162/65   Pulse 62   Temp 98.6 °F (37 °C) (Oral)   Resp 16   Ht 1.6 m (5' 2.99\")   Wt 77.6 kg (171 lb)   SpO2 100%   BMI 30.30 kg/m²

## 2024-08-15 NOTE — PLAN OF CARE
Problem: Safety - Adult  Goal: Free from fall injury  8/15/2024 0932 by Janae Jensen, RN  Flowsheets (Taken 8/11/2024 1639 by Ashok Brock RN)  Free From Fall Injury:   Instruct family/caregiver on patient safety   Based on caregiver fall risk screen, instruct family/caregiver to ask for assistance with transferring infant if caregiver noted to have fall risk factors

## 2024-08-15 NOTE — CARE COORDINATION
Patient admitted from [facility] and would like to return there upon discharge from here. I did confirm that with her son as well. I called [facility] [phone] to let them know she should be ready for d/c in about 2-3 days per surgery. We will need precert for placement and patient NEEDs to work with therapy.     Tube feeds running at 20 ml/hr. Will need TPN d/c'd prior to discharge to SNF as they cannot provide TPN at this SNF.     Electronically signed by Elaina Rutherford RN on 8/15/2024 at 2:53 PM  [phone]

## 2024-08-15 NOTE — PROGRESS NOTES
Physical Therapy  Facility/Department: [address]  Daily Treatment Note  NAME: Agustina Fried  : 1960  MRN: [id number]    Date of Service: 8/15/2024    Discharge Recommendations:  Subacute/Skilled Nursing Facility   PT Equipment Recommendations  Equipment Needed:  (defer for now)    Patient Diagnosis(es): Diagnoses of Small bowel obstruction (HCC), Rectal bleeding, and Duodenal obstruction were pertinent to this visit.    Assessment   Assessment: Pt remains below baseline. Requires CGA for transfers & ambulation using RW & frequent safety cues. Pt lives alone & has 3 flights of stairs to enter her apartment. Pt would benefit from continued inpt PT upon D/C. Will follow per plan of care.  Activity Tolerance: Patient tolerated treatment well  Equipment Needed:  (defer for now)     Plan    Physical Therapy Plan  General Plan:  (2-5)  Current Treatment Recommendations: Functional mobility training;Transfer training;Gait training;Endurance training;Safety education & training;Therapeutic activities     Restrictions  Position Activity Restriction  Other position/activity restrictions: \"Patient should be up walking in the halls each shift at minimum. Please document if patient refuses\"     Subjective    Subjective  Subjective: Pt seated in chair & agreeable to PT.  Pain: pt denies pain     Objective      Bed Mobility Training  Bed Mobility Training: No  Balance  Sitting: Intact  Standing - Static:  (SBA with RW)  Standing - Dynamic:  (CGA with RW)  Transfer Training  Transfer Training: Yes  Interventions: Verbal cues  Sit to Stand: Contact-guard assistance (from chair)  Stand to Sit: Contact-guard assistance  Gait  Gait Training: Yes  Overall Level of Assistance: Contact-guard assistance  Distance (ft):  (100 ft)  Assistive Device: Gait belt;Walker, rolling  Speed/Olga: Slow;Shuffled  Step Length: Left shortened;Right shortened  Gait Abnormalities: Decreased step clearance     PT Exercises  Exercise  Treatment: sit<->stand x 3from chair. seated LE therex x 15 bilat LE: ankle pumps, LAQ, marching.     Safety Devices  Type of Devices: Left in chair;Chair alarm in place;Call light within reach;Nurse notified       Goals  Short Term Goals  Time Frame for Short Term Goals: discharge  Short Term Goal 1: sit to/from supine supervision  Short Term Goal 2: sit to/from stand supervision  Short Term Goal 3: ambulate 100 ft with or without AD supervision  Patient Goals   Patient Goals : eventual return home    Education  Patient Education  Education Given To: Patient  Education Provided: Plan of Care;Transfer Training  Education Method: Verbal  Education Outcome: Continued education needed    AM-PAC - Mobility    AM-PAC Basic Mobility - Inpatient   How much help is needed turning from your back to your side while in a flat bed without using bedrails?: A Little  How much help is needed moving from lying on your back to sitting on the side of a flat bed without using bedrails?: A Little  How much help is needed moving to and from a bed to a chair?: A Little  How much help is needed standing up from a chair using your arms?: A Little  How much help is needed walking in hospital room?: A Little  How much help is needed climbing 3-5 steps with a railing?: A Lot  AM-PAC Inpatient Mobility Raw Score : 17  AM-PAC Inpatient T-Scale Score : 42.13  Mobility Inpatient CMS 0-100% Score: 50.57  Mobility Inpatient CMS G-Code Modifier : CK         Therapy Time   Individual Concurrent Group Co-treatment   Time In 1458         Time Out 1524         Minutes 26                 [address], PT

## 2024-08-15 NOTE — CONSULTS
Clinical Pharmacy Progress Note  Medication History     Pharmacy consulted to verify home medication list by Dr. Jeff.    List of of current medications patient is taking is in process. Home Medication list in EPIC updated to reflect changes noted below.    At time of initial admission to [facility] 7/30/24, pt came from [facility] ([phone]).  Called [facility] x2 this AM - initially told a list would be faxed, but did not receive anything.  Called again after waiting an hour, transferred x2, eventually was able to leave a voicemail for admission RN Belinda - asked her for assistance getting a med list of things patient was on at the facility prior to transfer to [facility] 7/30/24.  Still awaiting call back / med list.  For now, med list has been updated using [facility] pharmacy fills for facility.  May not be 100% accurate, but best info we have at this time.  Will continue trying to contact [facility] - if more information is obtained, will update med list, this note, and providers.    Addendum 8/16/24:  Medication list was received from [facility], but appears to be from 7/12/24.   Added PRN milk of magnesia, glycerin suppository, Fleet enema.  Dispense history contradicts facility paperwork for the following medications  Deutetrabenazine 9 mg on paperwork, 30 mg XR most recently filled  Prazosin 2 mg BID on paperwork, 2 mg nightly most recently filled    Gary Shearer PharmD  Main Pharmacy: [id number]  8/16/2024 2:09 PM      Changes made to medication list:   Medications removed:   None     Medications added:   Cetirizine 10mg po daily  Clonazepam 0.5mg po qhs  Clopidogrel 75mg po daily  Desvenlafaxine 25mg po daily  Pioglitazone 30mg po daily    Medication doses adjusted / updated:   Austedo - XR 30mg daily  Calcium / D 1 tab po BID  Prazosin 2mg po nightly    Please call with questions--  Thanks--  Valorie Tam PharmD, BCPS, BCGP  [id number] ([facility])   8/16/2024 2:09 PM      Current Outpatient  Medications   Medication Instructions    acetaminophen (TYLENOL) 650 mg, Oral, EVERY 6 HOURS PRN    Alcohol Swabs PADS 4 each, Does not apply, DAILY    amLODIPine (NORVASC) 5 mg, Oral, DAILY    atorvastatin (LIPITOR) 40 mg, Oral, NIGHTLY    Austedo XR 30 mg, Oral, DAILY    Blood Glucose Monitoring Suppl (TRUE METRIX METER) w/Device KIT Use daily to check blood sugar    blood glucose test strips (TRUE METRIX BLOOD GLUCOSE TEST) strip 4 each, In Vitro, DAILY, As needed.    calcium carbonate-vitamin D (CALTRATE) 600-400 MG-UNIT TABS per tab 1 tablet, Oral, 2 TIMES DAILY    cetirizine (ZYRTEC) 10 mg, Oral, DAILY    clonazePAM (KLONOPIN) 0.5 mg, Oral, NIGHTLY PRN    clopidogrel (PLAVIX) 75 mg, Oral, DAILY    Continuous Blood Gluc Sensor (FREESTYLE CHRISTY 2 SENSOR) MISC Replace every 2 weeks    desvenlafaxine succinate (PRISTIQ) 25 mg, Oral, DAILY    gabapentin (NEURONTIN) 200 mg, Oral, 2 times daily    glycerin 2 g suppository 1 suppository, Rectal, PRN    insulin glargine (LANTUS) 8 Units, SubCUTAneous, DAILY    insulin lispro (1 Unit Dial) (HUMALOG KWIKPEN) 0-4 Units, SubCUTAneous, 3 TIMES DAILY BEFORE MEALS    Insulin Pen Needle 32G X 4 MM MISC 1 each, Does not apply, DAILY    Lancets 30G MISC 1 each, Does not apply, DAILY    loperamide (IMODIUM) 2 mg, Oral, 4 TIMES DAILY PRN    magnesium hydroxide (MILK OF MAGNESIA) 400 MG/5ML suspension 30 mLs, Oral, DAILY PRN    oxyCODONE-acetaminophen (PERCOCET) 5-325 MG per tablet 1 tablet, Oral, EVERY 8 HOURS PRN    paliperidone (INVEGA) 9 mg, Oral, EVERY MORNING    pantoprazole (PROTONIX) 40 mg, Oral, 2 TIMES DAILY    pioglitazone (ACTOS) 30 mg, Oral, DAILY    prazosin (MINIPRESS) 2 mg, Oral, NIGHTLY    sodium phosphate (FLEET) 7-19 GM/118ML 1 enema, Rectal, PRN    torsemide (DEMADEX) 10 mg, Oral, DAILY    traZODone (DESYREL) 100 mg, Oral, NIGHTLY

## 2024-08-15 NOTE — PROGRESS NOTES
General Surgery  Progress Note      Procedure(s) Performed: Diagnostic laparoscopy, ex-lap, jose-en-y gastrojejunostomy bypass, feeding tube placement, and extensive lysis of adhesions.     Subjective:   Patient reporting increased pain from abdomen. Having multiple loose BMs per RN. J-tube was being vented while the G-tube was connected to TFs this AM.     Objective:    Vitals:   Vitals:    08/14/24 2011 08/14/24 2130 08/14/24 2345 08/15/24 0338   BP: (!) 175/68 (!) 147/65 (!) 151/63 (!) 162/65   Pulse: 80  61 62   Resp: 16  16    Temp: 98.2 °F (36.8 °C)  97.9 °F (36.6 °C) 98.6 °F (37 °C)   TempSrc: Oral  Oral Oral   SpO2: 94%  99% 100%   Weight:       Height:           Physical Exam:  General appearance: alert, no acute distress  Chest/Lungs: Normal effort with no accessory muscle use  Cardiovascular: RR,  well perfused  Abdomen: Soft, non-distended, appropriately tender. J-tube venting to drainage bag, G-tube with tube feeds  Skin: warm and dry, no rashes  Extremities: no edema, no cyanosis  Neuro: A&Ox3, no focal deficits, sensation intact    Assessment and Plan  This is a 64 y.o. year old female status post diagnostic laparoscopy, ex-lap, jose-en-y gastrojejunostomy bypass, feeding tube placement, and extensive lysis of adhesions secondary to duodenal obstruction. (8/11/24).    - Discussed with nurse that TUBE FEEDS & MEDS ALLOWED VIA JEJUNOSTOMY tube. GASTRIC TUBE only to DRAINAGE BAG  - PLEASE FLUSH BOTH JEJUNOSTOMY AND GASTRIC TUBE BEFORE AND AFTER ADMINISTRATION TO PREVENT CLOGGING  - MMPC  - IS ordered to bedside, encourage hourly IS and deep breathing  - Continue NPO, TPN  - Will discuss advancing tube feed rate with staff  - PT/OT, OOB, ambulate in halls    Carla Wagner MD  PGY4, General Surgery  08/15/24  7:05 AM  PerfectSer  [phone]

## 2024-08-15 NOTE — PROGRESS NOTES
friend to go to the grocery store  Occupation: Retired  Additional Comments: Pt from home but was at SNF since last admission at beginning of month. Pt reports at rehab was mostly IND with ADLs       Objective   Observation: IV, J tube, drain, telemetry in place (increased time needed for line management during ADLs)    Toilet Transfers  Equipment Used: Standard toilet (grab bar)  Toilet Transfer: Contact guard assistance (x3 reps during hygiene)    ADL  Grooming: SBA to wash hands standing at sink  UE Bathing: Maximum assistance (bathed abdoment and lower back that was soiled)  UE Dressing: Minimal assistance;Maximum assistance (doff/don gown and abdominal binder)  LE Dressing: Moderate assistance (doff/don brief)  Toileting: Maximum assistance (incontinent of watery BM in bed; pt partially completed anterior and posterior hygiene with assist for thoroughness, assist to doff soiled brief, pt able to pull up clean brief)  Toileting Skilled Clinical Factors: increased time needed for bed cleanup after incontinence.  Skin Care: Bath wipes    Bed mobility  Supine to Sit: Minimal assistance (HOB raised)  Scooting: Stand by assistance (hips to edge of bed)\    Transfers  Sit to stand: Contact guard assistance (bed)  Stand to sit: Contact guard assistance (chair)    Functional Mobility  Equipment: Rolling walker  Level of Assist: CGA  Distance: to/from bathroom    Education Given To: Patient  Education Provided: Role of Therapy;Plan of Care;ADL Adaptive Strategies;Transfer Training  Education Provided Comments: importance of OOB and increased mobility  Education Method: Verbal  Barriers to Learning: Cognition  Education Outcome: Continued education needed;Verbalized understanding    AM-PAC - ADL  AM-PAC Daily Activity - Inpatient   How much help is needed for putting on and taking off regular lower body clothing?: A Lot  How much help is needed for bathing (which includes washing, rinsing, drying)?: A Lot  How much help  is needed for toileting (which includes using toilet, bedpan, or urinal)?: A Lot  How much help is needed for putting on and taking off regular upper body clothing?: A Lot  How much help is needed for taking care of personal grooming?: A Little  How much help for eating meals?: A Little  AM-[facility] Inpatient Daily Activity Raw Score: 14  AM-PAC Inpatient ADL T-Scale Score : 33.39  ADL Inpatient CMS 0-100% Score: 59.67  ADL Inpatient CMS G-Code Modifier : CK      Safety Devices  Type of Devices: Left in chair;Chair alarm in place;Call light within reach;Nurse notified       Goals  Short Term Goals  Time Frame for Short Term Goals: by dc  Short Term Goal 1: Pt will complete toileting w/ spvn- ongoing  Short Term Goal 2: Pt will complete functional transfers w/ spvn- ongoing  Short Term Goal 3: Pt will complete LE dressing w/ spvn- ongoing  Patient Goals   Patient goals : \"Get my strength back.\"       Therapy Time   Individual Concurrent Group Co-treatment   Time In 1400         Time Out 1458         Minutes 58         Timed Code Treatment Minutes: 58 Minutes   Total Treatment Time: 58 minutes    Lisa Robins, OT

## 2024-08-15 NOTE — PLAN OF CARE
Problem: Safety - Adult  Goal: Free from fall injury  Note: Free from fall injury. Bed in lowest position. Bed brakes/alarm is on. Call light within reach. All patient's needs met at this time.      Problem: Chronic Conditions and Co-morbidities  Goal: Patient's chronic conditions and co-morbidity symptoms are monitored and maintained or improved  Note: BG controled, within normal range. TPN administered per order. Feeding tube is running with rate 10 ml /hr. Incontinent care performed.      Problem: Pain  Goal: Verbalizes/displays adequate comfort level or baseline comfort level  Note: Analgesics administered with benefit for the patient.

## 2024-08-16 LAB
ALBUMIN SERPL-MCNC: 2 G/DL (ref 3.4–5)
ALBUMIN SERPL-MCNC: 2.4 G/DL (ref 3.4–5)
ALBUMIN/GLOB SERPL: 0.5 {RATIO} (ref 1.1–2.2)
ALP SERPL-CCNC: 239 U/L (ref 40–129)
ALP SERPL-CCNC: 242 U/L (ref 40–129)
ALT SERPL-CCNC: 8 U/L (ref 10–40)
ALT SERPL-CCNC: 8 U/L (ref 10–40)
ANION GAP SERPL CALCULATED.3IONS-SCNC: 10 MMOL/L (ref 3–16)
ANION GAP SERPL CALCULATED.3IONS-SCNC: 11 MMOL/L (ref 3–16)
AST SERPL-CCNC: 13 U/L (ref 15–37)
AST SERPL-CCNC: 15 U/L (ref 15–37)
BACTERIA URNS QL MICRO: ABNORMAL /HPF
BASOPHILS # BLD: 0 K/UL (ref 0–0.2)
BASOPHILS NFR BLD: 0.2 %
BILIRUB DIRECT SERPL-MCNC: 0.2 MG/DL (ref 0–0.3)
BILIRUB INDIRECT SERPL-MCNC: ABNORMAL MG/DL (ref 0–1)
BILIRUB SERPL-MCNC: 0.3 MG/DL (ref 0–1)
BILIRUB SERPL-MCNC: <0.2 MG/DL (ref 0–1)
BILIRUB UR QL STRIP.AUTO: NEGATIVE
BUN SERPL-MCNC: 36 MG/DL (ref 7–20)
BUN SERPL-MCNC: 39 MG/DL (ref 7–20)
CALCIUM SERPL-MCNC: 7.8 MG/DL (ref 8.3–10.6)
CALCIUM SERPL-MCNC: 7.9 MG/DL (ref 8.3–10.6)
CHLORIDE SERPL-SCNC: 114 MMOL/L (ref 99–110)
CHLORIDE SERPL-SCNC: 116 MMOL/L (ref 99–110)
CLARITY UR: ABNORMAL
CO2 SERPL-SCNC: 10 MMOL/L (ref 21–32)
CO2 SERPL-SCNC: 7 MMOL/L (ref 21–32)
COLOR UR: YELLOW
CREAT SERPL-MCNC: 1.7 MG/DL (ref 0.6–1.2)
CREAT SERPL-MCNC: 1.7 MG/DL (ref 0.6–1.2)
DEPRECATED RDW RBC AUTO: 16 % (ref 12.4–15.4)
EOSINOPHIL # BLD: 0.1 K/UL (ref 0–0.6)
EOSINOPHIL NFR BLD: 0.8 %
EPI CELLS #/AREA URNS HPF: ABNORMAL /HPF (ref 0–5)
GFR SERPLBLD CREATININE-BSD FMLA CKD-EPI: 33 ML/MIN/{1.73_M2}
GFR SERPLBLD CREATININE-BSD FMLA CKD-EPI: 33 ML/MIN/{1.73_M2}
GLUCOSE BLD-MCNC: 146 MG/DL (ref 70–99)
GLUCOSE BLD-MCNC: 236 MG/DL (ref 70–99)
GLUCOSE BLD-MCNC: 357 MG/DL (ref 70–99)
GLUCOSE BLD-MCNC: 384 MG/DL (ref 70–99)
GLUCOSE BLD-MCNC: 392 MG/DL (ref 70–99)
GLUCOSE SERPL-MCNC: 403 MG/DL (ref 70–99)
GLUCOSE SERPL-MCNC: 420 MG/DL (ref 70–99)
GLUCOSE UR STRIP.AUTO-MCNC: NEGATIVE MG/DL
HCT VFR BLD AUTO: 33.9 % (ref 36–48)
HGB BLD-MCNC: 10.5 G/DL (ref 12–16)
HGB UR QL STRIP.AUTO: ABNORMAL
KETONES UR STRIP.AUTO-MCNC: NEGATIVE MG/DL
LEUKOCYTE ESTERASE UR QL STRIP.AUTO: ABNORMAL
LYMPHOCYTES # BLD: 1.3 K/UL (ref 1–5.1)
LYMPHOCYTES NFR BLD: 11.8 %
MAGNESIUM SERPL-MCNC: 2.2 MG/DL (ref 1.8–2.4)
MCH RBC QN AUTO: 30.3 PG (ref 26–34)
MCHC RBC AUTO-ENTMCNC: 31 G/DL (ref 31–36)
MCV RBC AUTO: 97.6 FL (ref 80–100)
MONOCYTES # BLD: 0.8 K/UL (ref 0–1.3)
MONOCYTES NFR BLD: 6.6 %
NEUTROPHILS # BLD: 9.1 K/UL (ref 1.7–7.7)
NEUTROPHILS NFR BLD: 80.6 %
NITRITE UR QL STRIP.AUTO: NEGATIVE
PERFORMED ON: ABNORMAL
PH UR STRIP.AUTO: 6 [PH] (ref 5–8)
PHOSPHATE SERPL-MCNC: 3.6 MG/DL (ref 2.5–4.9)
PHOSPHATE SERPL-MCNC: 3.7 MG/DL (ref 2.5–4.9)
PLATELET # BLD AUTO: 139 K/UL (ref 135–450)
PMV BLD AUTO: 9 FL (ref 5–10.5)
POTASSIUM SERPL-SCNC: 5.6 MMOL/L (ref 3.5–5.1)
POTASSIUM SERPL-SCNC: 5.6 MMOL/L (ref 3.5–5.1)
POTASSIUM SERPL-SCNC: 5.7 MMOL/L (ref 3.5–5.1)
PROT SERPL-MCNC: 6.8 G/DL (ref 6.4–8.2)
PROT SERPL-MCNC: 7.1 G/DL (ref 6.4–8.2)
PROT UR STRIP.AUTO-MCNC: 100 MG/DL
RBC # BLD AUTO: 3.48 M/UL (ref 4–5.2)
RBC #/AREA URNS HPF: ABNORMAL /HPF (ref 0–4)
SODIUM SERPL-SCNC: 134 MMOL/L (ref 136–145)
SODIUM SERPL-SCNC: 134 MMOL/L (ref 136–145)
SP GR UR STRIP.AUTO: >=1.03 (ref 1–1.03)
UA DIPSTICK W REFLEX MICRO PNL UR: YES
URN SPEC COLLECT METH UR: ABNORMAL
UROBILINOGEN UR STRIP-ACNC: 0.2 E.U./DL
WBC # BLD AUTO: 11.3 K/UL (ref 4–11)
WBC #/AREA URNS HPF: ABNORMAL /HPF (ref 0–5)
YEAST URNS QL MICRO: PRESENT /HPF

## 2024-08-16 PROCEDURE — 6370000000 HC RX 637 (ALT 250 FOR IP): Performed by: INTERNAL MEDICINE

## 2024-08-16 PROCEDURE — 6360000002 HC RX W HCPCS

## 2024-08-16 PROCEDURE — 2580000003 HC RX 258

## 2024-08-16 PROCEDURE — 83735 ASSAY OF MAGNESIUM: CPT

## 2024-08-16 PROCEDURE — 6370000000 HC RX 637 (ALT 250 FOR IP)

## 2024-08-16 PROCEDURE — 36415 COLL VENOUS BLD VENIPUNCTURE: CPT

## 2024-08-16 PROCEDURE — 1200000000 HC SEMI PRIVATE

## 2024-08-16 PROCEDURE — 2580000003 HC RX 258: Performed by: INTERNAL MEDICINE

## 2024-08-16 PROCEDURE — 2500000003 HC RX 250 WO HCPCS

## 2024-08-16 PROCEDURE — 2500000003 HC RX 250 WO HCPCS: Performed by: INTERNAL MEDICINE

## 2024-08-16 PROCEDURE — 99232 SBSQ HOSP IP/OBS MODERATE 35: CPT | Performed by: INTERNAL MEDICINE

## 2024-08-16 PROCEDURE — 80053 COMPREHEN METABOLIC PANEL: CPT

## 2024-08-16 PROCEDURE — 81001 URINALYSIS AUTO W/SCOPE: CPT

## 2024-08-16 PROCEDURE — 85025 COMPLETE CBC W/AUTO DIFF WBC: CPT

## 2024-08-16 RX ORDER — SODIUM CHLORIDE 9 MG/ML
INJECTION, SOLUTION INTRAVENOUS CONTINUOUS
Status: DISCONTINUED | OUTPATIENT
Start: 2024-08-16 | End: 2024-08-16

## 2024-08-16 RX ORDER — MAG HYDROX/ALUMINUM HYD/SIMETH 400-400-40
1 SUSPENSION, ORAL (FINAL DOSE FORM) ORAL PRN
Status: ON HOLD | COMMUNITY
End: 2024-08-22 | Stop reason: HOSPADM

## 2024-08-16 RX ORDER — AMLODIPINE BESYLATE 5 MG/1
5 TABLET ORAL DAILY
Status: DISCONTINUED | OUTPATIENT
Start: 2024-08-16 | End: 2024-08-22 | Stop reason: HOSPADM

## 2024-08-16 RX ADMIN — SODIUM CHLORIDE, PRESERVATIVE FREE 40 MG: 5 INJECTION INTRAVENOUS at 09:02

## 2024-08-16 RX ADMIN — VANCOMYCIN HYDROCHLORIDE 250 MG: 250 POWDER, FOR SOLUTION ORAL at 18:04

## 2024-08-16 RX ADMIN — THIAMINE HYDROCHLORIDE 100 MG: 100 INJECTION, SOLUTION INTRAMUSCULAR; INTRAVENOUS at 09:02

## 2024-08-16 RX ADMIN — SODIUM CHLORIDE, PRESERVATIVE FREE 40 MG: 5 INJECTION INTRAVENOUS at 20:24

## 2024-08-16 RX ADMIN — VANCOMYCIN HYDROCHLORIDE 250 MG: 250 POWDER, FOR SOLUTION ORAL at 13:19

## 2024-08-16 RX ADMIN — AMLODIPINE BESYLATE 5 MG: 5 TABLET ORAL at 10:37

## 2024-08-16 RX ADMIN — VANCOMYCIN HYDROCHLORIDE 250 MG: 250 POWDER, FOR SOLUTION ORAL at 00:33

## 2024-08-16 RX ADMIN — ENOXAPARIN SODIUM 40 MG: 100 INJECTION SUBCUTANEOUS at 09:02

## 2024-08-16 RX ADMIN — HYDROMORPHONE HYDROCHLORIDE 0.5 MG: 1 INJECTION, SOLUTION INTRAMUSCULAR; INTRAVENOUS; SUBCUTANEOUS at 18:08

## 2024-08-16 RX ADMIN — HYDROMORPHONE HYDROCHLORIDE 0.5 MG: 1 INJECTION, SOLUTION INTRAMUSCULAR; INTRAVENOUS; SUBCUTANEOUS at 00:31

## 2024-08-16 RX ADMIN — SODIUM CHLORIDE, PRESERVATIVE FREE 10 ML: 5 INJECTION INTRAVENOUS at 20:26

## 2024-08-16 RX ADMIN — ASCORBIC ACID, VITAMIN A PALMITATE, CHOLECALCIFEROL, THIAMINE HYDROCHLORIDE, RIBOFLAVIN-5 PHOSPHATE SODIUM, PYRIDOXINE HYDROCHLORIDE, NIACINAMIDE, DEXPANTHENOL, ALPHA-TOCOPHEROL ACETATE, VITAMIN K1, FOLIC ACID, BIOTIN, CYANOCOBALAMIN: 200; 3300; 200; 6; 3.6; 6; 40; 15; 10; 150; 600; 60; 5 INJECTION, SOLUTION INTRAVENOUS at 20:23

## 2024-08-16 RX ADMIN — TRAZODONE HYDROCHLORIDE 100 MG: 100 TABLET ORAL at 20:26

## 2024-08-16 RX ADMIN — SODIUM BICARBONATE: 84 INJECTION, SOLUTION INTRAVENOUS at 22:39

## 2024-08-16 RX ADMIN — HYDROMORPHONE HYDROCHLORIDE 0.5 MG: 1 INJECTION, SOLUTION INTRAMUSCULAR; INTRAVENOUS; SUBCUTANEOUS at 13:52

## 2024-08-16 RX ADMIN — HYDROMORPHONE HYDROCHLORIDE 0.5 MG: 1 INJECTION, SOLUTION INTRAMUSCULAR; INTRAVENOUS; SUBCUTANEOUS at 06:15

## 2024-08-16 RX ADMIN — GABAPENTIN 200 MG: 100 CAPSULE ORAL at 09:02

## 2024-08-16 RX ADMIN — SODIUM BICARBONATE: 84 INJECTION, SOLUTION INTRAVENOUS at 10:29

## 2024-08-16 RX ADMIN — VANCOMYCIN HYDROCHLORIDE 250 MG: 250 POWDER, FOR SOLUTION ORAL at 23:34

## 2024-08-16 RX ADMIN — METRONIDAZOLE 500 MG: 500 INJECTION, SOLUTION INTRAVENOUS at 22:43

## 2024-08-16 RX ADMIN — HYDROMORPHONE HYDROCHLORIDE 0.5 MG: 1 INJECTION, SOLUTION INTRAMUSCULAR; INTRAVENOUS; SUBCUTANEOUS at 22:36

## 2024-08-16 RX ADMIN — VANCOMYCIN HYDROCHLORIDE 250 MG: 250 POWDER, FOR SOLUTION ORAL at 06:18

## 2024-08-16 RX ADMIN — INSULIN LISPRO 4 UNITS: 100 INJECTION, SOLUTION INTRAVENOUS; SUBCUTANEOUS at 22:44

## 2024-08-16 RX ADMIN — METRONIDAZOLE 500 MG: 500 INJECTION, SOLUTION INTRAVENOUS at 15:19

## 2024-08-16 RX ADMIN — METRONIDAZOLE 500 MG: 500 INJECTION, SOLUTION INTRAVENOUS at 06:18

## 2024-08-16 RX ADMIN — INSULIN LISPRO 16 UNITS: 100 INJECTION, SOLUTION INTRAVENOUS; SUBCUTANEOUS at 13:18

## 2024-08-16 ASSESSMENT — PAIN DESCRIPTION - LOCATION
LOCATION: ABDOMEN

## 2024-08-16 ASSESSMENT — PAIN SCALES - GENERAL
PAINLEVEL_OUTOF10: 2
PAINLEVEL_OUTOF10: 8
PAINLEVEL_OUTOF10: 3
PAINLEVEL_OUTOF10: 7
PAINLEVEL_OUTOF10: 2
PAINLEVEL_OUTOF10: 9
PAINLEVEL_OUTOF10: 8
PAINLEVEL_OUTOF10: 8
PAINLEVEL_OUTOF10: 3
PAINLEVEL_OUTOF10: 3

## 2024-08-16 ASSESSMENT — PAIN DESCRIPTION - ONSET
ONSET: ON-GOING

## 2024-08-16 ASSESSMENT — PAIN DESCRIPTION - ORIENTATION
ORIENTATION: ANTERIOR

## 2024-08-16 ASSESSMENT — PAIN DESCRIPTION - DESCRIPTORS
DESCRIPTORS: ACHING

## 2024-08-16 ASSESSMENT — PAIN DESCRIPTION - FREQUENCY
FREQUENCY: INTERMITTENT

## 2024-08-16 ASSESSMENT — PAIN DESCRIPTION - PAIN TYPE
TYPE: SURGICAL PAIN

## 2024-08-16 ASSESSMENT — PAIN - FUNCTIONAL ASSESSMENT
PAIN_FUNCTIONAL_ASSESSMENT: ACTIVITIES ARE NOT PREVENTED

## 2024-08-16 NOTE — PROGRESS NOTES
V2.0    [facility] Progress Note      Name:  Agustina Fried /Age/Sex: 1960  (64 y.o. female)   MRN & CSN:  [id number] & [id number] Encounter Date/Time: 2024 9:46 AM EDT   Location:  5319/5319-01 PCP: Viridiana Blanco APRN - NP     Attending:Zahida Koenig MD       Hospital Day: 15    Assessment and Recommendations   Agustina Fried is a 64 y.o. female with pmh of CVA, depression, anxiety, diabetes, hypertension, heart failure, CHF, renal, breast cancer, colon cancer who presents with nausea vomiting and rectal bleeding.  CT showed severe gastric distention.  She underwent a EGD which showed duodenal mass resulting in obstruction.  Patient was transferred to [facility] for surgery oncology input.  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- Ct TPN diet per surgery , now on tube feed  -s/p diagnostic laparoscopy with exploratory laparotomy gastrojejunal bypass with feeding tube placement and lysis of additions on 2024   -CT showed a 6 cm area of necrotic mass along the duodenum which abuts the hepatic flexure of the colon  -EGD showed duodenal compression with a large mass obstructing that could not be bypassed with the scope  -Colonoscopy on  showed bleeding mass, suggestive external growth into the colon  -Pathology showed high-grade adenocarcinoma  -GI oncology surgery consulted  -Continue decompression  -Nutrition through TPN    Hypertension-blood pressure improved, still on higher side  -Continue clonidine patch       Hyperglycemia  -Getting insulin through TPN but blood sugar still on the high side.  Add sliding scale insulin    Possible refeeding syndrome- improved   -Replace electrolytes aggressively    Elevated ALP  -Prior MRI showed mild intra and extrahepatic biliary dilatation with abrupt  650 mg, Q6H PRN  glucose, 4 tablet, PRN  dextrose bolus, 125 mL, PRN   Or  dextrose bolus, 250 mL, PRN  glucagon (rDNA), 1 mg, PRN  dextrose, , Continuous PRN        Labs and Imaging   XR ABDOMEN (KUB) (SINGLE AP VIEW)    Result Date: 8/11/2024  EXAM: AP ABDOMEN INDICATION: NGT placement COMPARISON: 8/4/2024 FINDINGS: Nasogastric tube tip is in the left upper quadrant, projection of the body of the stomach.     Nasogastric tube tip in the stomach. Electronically signed by Lito Ellison MD    EGD    Result Date: 8/5/2024  No dictation       CBC:   Recent Labs     08/14/24  0644 08/15/24  0719 08/16/24 0432   WBC 12.4* 13.5* 11.3*   HGB 10.1* 10.7* 10.5*    162 139     BMP:    Recent Labs     08/14/24  0644 08/15/24  0710 08/16/24  0432    137 134*   K 4.3 4.5 5.7*   * 111* 116*   CO2 19* 18* 7*   BUN 37* 37* 36*   CREATININE 1.5* 1.6* 1.7*   GLUCOSE 100* 154* 420*     Hepatic:   Recent Labs     08/14/24  0644 08/16/24  0432   AST 20 15   ALT 9* 8*   BILITOT 0.4 <0.2   ALKPHOS 119 242*     Lipids:   Lab Results   Component Value Date/Time    CHOL 154 11/27/2023 07:00 PM    HDL 75 11/27/2023 07:00 PM    TRIG 36 08/15/2024 07:10 AM     Hemoglobin A1C:   Lab Results   Component Value Date/Time    LABA1C 5.4 07/30/2024 10:03 AM     TSH:   Lab Results   Component Value Date/Time    TSH 0.86 12/11/2022 04:45 AM     Troponin: No results found for: \"TROPONINT\"  Lactic Acid: No results for input(s): \"LACTA\" in the last 72 hours.  BNP: No results for input(s): \"PROBNP\" in the last 72 hours.  UA:  Lab Results   Component Value Date/Time    NITRU Negative 07/31/2024 04:34 AM    COLORU Yellow 07/31/2024 04:34 AM    PHUR 6.0 07/31/2024 04:34 AM    PHUR 5.5 04/18/2024 04:40 PM    WBCUA 21-50 07/31/2024 04:34 AM    RBCUA 5-10 07/31/2024 04:34 AM    YEAST Present 12/10/2023 02:53 PM    BACTERIA 4+ 07/31/2024 04:34 AM    CLARITYU SL CLOUDY 07/31/2024 04:34 AM    SPECGRAV 1.010 01/06/2013 12:11 AM    LEUKOCYTESUR TRACE

## 2024-08-16 NOTE — PROGRESS NOTES
General Surgery  Progress Note      Procedure(s) Performed: Diagnostic laparoscopy, ex-lap, jose-en-y gastrojejunostomy bypass, feeding tube placement, and extensive lysis of adhesions.     Subjective:   Patient's pain is well-controlled. No complaints this morning. Tolerating feeds at 20 cc/hr.    Objective:    Vitals:   Vitals:    08/16/24 0036 08/16/24 0101 08/16/24 0458 08/16/24 0615   BP: (!) 137/56  (!) 147/55    Pulse: 75  72    Resp: 16 16 16 16   Temp: 98.9 °F (37.2 °C)  98.6 °F (37 °C)    TempSrc: Oral  Axillary    SpO2: 98%  100%    Weight:       Height:           Physical Exam:  General appearance: alert, no acute distress  Chest/Lungs: Normal effort with no accessory muscle use  Cardiovascular: RRR,  well perfused  Abdomen: Soft, non-distended, appropriately tender. G-tube venting to drainage bag, J-tube with tube feeds at 20 cc/hr  Skin: Warm and dry, no rashes  Extremities: No edema, no cyanosis  Neuro: A&Ox3, no focal deficits, sensation intact    Assessment and Plan  This is a 64 y.o. year old female status post diagnostic laparoscopy, ex-lap, jose-en-y gastrojejunostomy bypass, feeding tube placement, and extensive lysis of adhesions secondary to duodenal obstruction. (8/11/24).    - Discussed with nurse that TUBE FEEDS & MEDS ALLOWED VIA JEJUNOSTOMY tube. GASTRIC TUBE only to DRAINAGE BAG  - PLEASE FLUSH BOTH JEJUNOSTOMY AND GASTRIC TUBE BEFORE AND AFTER ADMINISTRATION TO PREVENT CLOGGING  - MMPC  - IS ordered to bedside, encourage hourly IS and deep breathing  - Okay for NPO sips of clears/chips  - Continue full night cycle TPN  - Will discuss advancing tube feed rate with staff  - PT/OT, OOB, ambulate in halls      Emily Jeff DO, MSMEd  PGY-3, General Surgery  08/16/24  7:55 AM  Heidi

## 2024-08-16 NOTE — CARE COORDINATION
Patient admitted from [facility] and hopes to return there at d/c. I spoke with admissions at [facility] [phone] and they are reviewing her clinicals currently but beds are tight and they are not sure if they will be able to bring her back there. I spoke with the patient to see if there was a back up plan and she asked me to call her daughter-in-law Marlyn. She asked me to send a referral to [facility]. I sent the referral and left a message with them as well. Will need precert for placement at d/c.     Electronically signed by Elaina Rutherford RN on 8/16/2024 at 3:13 PM  [phone]

## 2024-08-16 NOTE — PROGRESS NOTES
Ph: [phone], Fax: [phone]           [facility]               Reason for admission:                 Pain abdomen nausea and vomiting    Brief Summary :     Agustina Fried is being seen by nephrology for CKD       Interval History and plan:     Seen in room  BP is labile but better controlled    Urine is 300 ml  + +  Creatinine is 1.2 > 1.4> 1.7  SP extensive surgery  ( Diagnostic laparoscopy, ex-lap, jose-en-y gastrojejunostomy bypass, feeding tube placement, and extensive lysis of adhesions )      Continue clonidine patch to 0.3 mg q weekly and amlodipine   Place a Dooley catheter  Stat renal panel as morning labs are out of expected range.  Start IV fluid with bicarbonate                       Assessment :       CKD Stage IIIa  Creatinine has been variable in the past she has a history of recurrent SHARRON  Last creatinine was 2 upon discharge from 7/2 hospitalization  she has history of diabetes mellitus hypertension    CHF  Echo (7/2/24)    Limited only for LVEF and RVF.    Image quality is adequate.    Left Ventricle: Normal left ventricular systolic function with a visually estimated EF of 55 - 60%. Normal wall motion.    Right ventricle size is normal. Normal systolic function.    Does not appear volume overloaded      Hypertension   BP: (147)/(55)  Pulse:  [72]   BP goal inpatient 130-140 systolic inpatient        [facility] would like to thank Zahida Koenig MD   for opportunity to serve this patient      Please call with questions at-   24 Hrs Answering service [phone] or  7 am- 5 pm via Perfect [employer] or cell phone  Dr.Muhammad Dayne Trimble MD       HPI :     Agustina Fried is a 64 y.o. female presented to   the hospital on 8/2/2024 with  pain abdomen nausea and vomiting.  She is known to have nausea vomiting diarrhea for several days because of which she came to the hospital.  She needed NG tube placement in the emergency room.  Found to have severe gastric

## 2024-08-16 NOTE — PLAN OF CARE
Problem: Safety - Adult  Goal: Free from fall injury  8/15/2024 2134 by Cinthya Shankar, RN  Outcome: Progressing  8/15/2024 0932 by Janae Jensen, RN  Flowsheets (Taken 8/11/2024 1639 by Ashok Brock, RN)  Free From Fall Injury:   Instruct family/caregiver on patient safety   Based on caregiver fall risk screen, instruct family/caregiver to ask for assistance with transferring infant if caregiver noted to have fall risk factors     Problem: Chronic Conditions and Co-morbidities  Goal: Patient's chronic conditions and co-morbidity symptoms are monitored and maintained or improved  Outcome: Progressing     Problem: Discharge Planning  Goal: Discharge to home or other facility with appropriate resources  Outcome: Progressing     Problem: ABCDS Injury Assessment  Goal: Absence of physical injury  Outcome: Progressing     Problem: Nutrition Deficit:  Goal: Optimize nutritional status  Outcome: Progressing     Problem: Skin/Tissue Integrity  Goal: Absence of new skin breakdown  Description: 1.  Monitor for areas of redness and/or skin breakdown  2.  Assess vascular access sites hourly  3.  Every 4-6 hours minimum:  Change oxygen saturation probe site  4.  Every 4-6 hours:  If on nasal continuous positive airway pressure, respiratory therapy assess nares and determine need for appliance change or resting period.  Outcome: Progressing     Problem: Pain  Goal: Verbalizes/displays adequate comfort level or baseline comfort level  Outcome: Progressing

## 2024-08-16 NOTE — PROGRESS NOTES
Pt VSS. Pt had multiple watery Bms today. Pt getting iv abx and abx through GJ tube. Pt ambulated. Pt voiding. Purewick in place to track I/Os. Tube feeds at goal.

## 2024-08-16 NOTE — PROGRESS NOTES
[facility] -  Clinical Pharmacy Note    Parenteral Nutrition - Management by Pharmacy    Consult Date(s): 8/4/24  Consulting Provider(s): Dr Butler    Assessment / Plan  1)  Gastric outlet obstruction 2/2 ulcerated mass, ileus - Parenteral Nutrition  Day of Therapy: 13  Formulation:  Clinimix E 5/20  Clinimix Rate: Changing to nocturnal cyclic TPN per surgery team  Cycle TPN over 10 hours from 8 pm to 6 am.  80 mL/hr x 1 hr (9574-2632)  190 mL/hr x 8 hrs (5201-9197)  80 mL/hr x 1 hr (9754-2570)  Total volume = 1680 mL/day - at goal  Lipids:  20% 250mL over 12 hours on Mon & Thurs only (due to national shortage)  TF at 20mL/hr; will possibly advance to 30mL/hr today (goal 40mL/hr per RD).    Discussed with RD yesterday - will continue lipids for now.  Re-assess Monday 8/19 - if tolerating TF closer to goal rate, can then d/c lipids.  Appreciate Clinical Dietitian's recommendations for formulation and goal rate.  Electrolytes:  Clinimix-E includes standard electrolyte formulation.  Recommend further repletion with supplemental IVPBs as needed.  Potassium elevated to 5.7 today but specimen hemolyzed. Mg and Phos remain WNL.  Maintenance IVF:  n/a  Glucose control:    Pt has h/o DM.  Takes Lantus 8 units daily + Humalog 4 units TID with meals at home.  Glucoses ranged 357-420 since TPN bag hung last evening.  Used 0 units high dose SSI (order remains on hold by provider).  Continue Regular insulin at 40 units / day in TPN bag today.  Recommend additional sliding scale insulin outside of TPN to accommodate glucose increase from tube feeds.  Will monitor glucoses and adjust insulin in TPN bag as needed.     Add MVI 10 mL/day in PN on Mon-Wed-Fri only (due to national shortage) & Trace Elements 1 mL/day in PN daily.  Daily renal panel, daily magnesium, and weekly triglycerides ordered per protocol.  PN will be re-ordered daily.    Gary Shearer, PharmD  Main Pharmacy: [id number]  8/16/2024 6:52 AM      Interval update:

## 2024-08-16 NOTE — PROGRESS NOTES
ID Follow-up NOTE    CC:   Recurrent C diff infection  Antibiotics: ME Vanco, IV Metronidazole    Admit Date: 8/2/2024  Hospital Day: 15    Subjective:     POD#4, gastroduodenectomy with reanastomosis, J-tube    Patient still c/o abd pain. Having diarrhea few times overnight and two episode this morning as per RN. Diarrhea is profuse and watery, having some blood in it. Having abdominal tenderness. Tolerating tube feeds and vancomycin through J-tube.    Objective:     Patient Vitals for the past 8 hrs:   BP Temp Temp src Pulse Resp SpO2 Weight   08/16/24 0859 (!) 167/65 98.3 °F (36.8 °C) Oral 71 16 100 % --   08/16/24 0615 -- -- -- -- 16 -- --   08/16/24 0600 -- -- -- -- -- -- 77 kg (169 lb 12.1 oz)   08/16/24 0458 (!) 147/55 98.6 °F (37 °C) Axillary 72 16 100 % --     I/O last 3 completed shifts:  In: 20 [I.V.:20]  Out: 300 [Urine:300]  No intake/output data recorded.    EXAM:  GENERAL: No apparent distress. Chronically ill.  HEENT: Membranes moist, no oral lesion  NECK:  Supple, no lymphadenopathy  LUNGS: Clear b/l, no rales, no dullness  CARDIAC: RRR, no murmur appreciated  ABD:  + BS, soft , diffuse tenderness, J-tube  EXT:  No rash, no edema, no lesions  NEURO: No focal neurologic findings  PSYCH: Orientation, sensorium, mood normal  LINES:  R PICC, placed 8/4       Data Review:  Lab Results   Component Value Date    WBC 11.3 (H) 08/16/2024    HGB 10.5 (L) 08/16/2024    HCT 33.9 (L) 08/16/2024    MCV 97.6 08/16/2024     08/16/2024     Lab Results   Component Value Date    CREATININE 1.7 (H) 08/16/2024    BUN 36 (H) 08/16/2024     (L) 08/16/2024    K 5.7 (H) 08/16/2024     (H) 08/16/2024    CO2 7 (LL) 08/16/2024       Hepatic Function Panel:   Lab Results   Component Value Date/Time    ALKPHOS 242 08/16/2024 04:32 AM    ALT 8 08/16/2024 04:32 AM    AST 15 08/16/2024 04:32 AM    BILITOT <0.2 08/16/2024 04:32 AM    BILIDIR 0.2 08/16/2024 04:32 AM    IBILI see below 08/16/2024 04:32 AM

## 2024-08-17 ENCOUNTER — APPOINTMENT (OUTPATIENT)
Dept: GENERAL RADIOLOGY | Age: 64
End: 2024-08-17
Attending: SURGERY
Payer: MEDICAID

## 2024-08-17 LAB
ALBUMIN SERPL-MCNC: 2.1 G/DL (ref 3.4–5)
ALBUMIN SERPL-MCNC: 2.5 G/DL (ref 3.4–5)
ALBUMIN/GLOB SERPL: 0.4 {RATIO} (ref 1.1–2.2)
ALP SERPL-CCNC: 332 U/L (ref 40–129)
ALT SERPL-CCNC: 9 U/L (ref 10–40)
ANION GAP SERPL CALCULATED.3IONS-SCNC: 8 MMOL/L (ref 3–16)
ANION GAP SERPL CALCULATED.3IONS-SCNC: 8 MMOL/L (ref 3–16)
AST SERPL-CCNC: 14 U/L (ref 15–37)
BASOPHILS # BLD: 0.1 K/UL (ref 0–0.2)
BASOPHILS NFR BLD: 0.6 %
BILIRUB SERPL-MCNC: 0.3 MG/DL (ref 0–1)
BUN SERPL-MCNC: 46 MG/DL (ref 7–20)
BUN SERPL-MCNC: 49 MG/DL (ref 7–20)
CALCIUM SERPL-MCNC: 8 MG/DL (ref 8.3–10.6)
CALCIUM SERPL-MCNC: 8.1 MG/DL (ref 8.3–10.6)
CHLORIDE SERPL-SCNC: 115 MMOL/L (ref 99–110)
CHLORIDE SERPL-SCNC: 119 MMOL/L (ref 99–110)
CO2 SERPL-SCNC: 11 MMOL/L (ref 21–32)
CO2 SERPL-SCNC: 11 MMOL/L (ref 21–32)
CREAT SERPL-MCNC: 1.8 MG/DL (ref 0.6–1.2)
CREAT SERPL-MCNC: 2 MG/DL (ref 0.6–1.2)
DEPRECATED RDW RBC AUTO: 16.3 % (ref 12.4–15.4)
EOSINOPHIL # BLD: 0.1 K/UL (ref 0–0.6)
EOSINOPHIL NFR BLD: 0.6 %
GFR SERPLBLD CREATININE-BSD FMLA CKD-EPI: 27 ML/MIN/{1.73_M2}
GFR SERPLBLD CREATININE-BSD FMLA CKD-EPI: 31 ML/MIN/{1.73_M2}
GLUCOSE BLD-MCNC: 108 MG/DL (ref 70–99)
GLUCOSE BLD-MCNC: 174 MG/DL (ref 70–99)
GLUCOSE BLD-MCNC: 215 MG/DL (ref 70–99)
GLUCOSE BLD-MCNC: 474 MG/DL (ref 70–99)
GLUCOSE BLD-MCNC: 70 MG/DL (ref 70–99)
GLUCOSE SERPL-MCNC: 477 MG/DL (ref 70–99)
GLUCOSE SERPL-MCNC: 63 MG/DL (ref 70–99)
HCT VFR BLD AUTO: 31.8 % (ref 36–48)
HGB BLD-MCNC: 10 G/DL (ref 12–16)
LYMPHOCYTES # BLD: 0.9 K/UL (ref 1–5.1)
LYMPHOCYTES NFR BLD: 8.6 %
MAGNESIUM SERPL-MCNC: 2.3 MG/DL (ref 1.8–2.4)
MCH RBC QN AUTO: 30 PG (ref 26–34)
MCHC RBC AUTO-ENTMCNC: 31.4 G/DL (ref 31–36)
MCV RBC AUTO: 95.7 FL (ref 80–100)
MONOCYTES # BLD: 0.6 K/UL (ref 0–1.3)
MONOCYTES NFR BLD: 5.5 %
NEUTROPHILS # BLD: 8.6 K/UL (ref 1.7–7.7)
NEUTROPHILS NFR BLD: 84.7 %
PERFORMED ON: ABNORMAL
PERFORMED ON: NORMAL
PHOSPHATE SERPL-MCNC: 3.5 MG/DL (ref 2.5–4.9)
PLATELET # BLD AUTO: 130 K/UL (ref 135–450)
PMV BLD AUTO: 10.4 FL (ref 5–10.5)
POTASSIUM SERPL-SCNC: 5.4 MMOL/L (ref 3.5–5.1)
POTASSIUM SERPL-SCNC: 5.7 MMOL/L (ref 3.5–5.1)
PROT SERPL-MCNC: 6.8 G/DL (ref 6.4–8.2)
RBC # BLD AUTO: 3.32 M/UL (ref 4–5.2)
SODIUM SERPL-SCNC: 134 MMOL/L (ref 136–145)
SODIUM SERPL-SCNC: 138 MMOL/L (ref 136–145)
WBC # BLD AUTO: 10.1 K/UL (ref 4–11)

## 2024-08-17 PROCEDURE — 74018 RADEX ABDOMEN 1 VIEW: CPT

## 2024-08-17 PROCEDURE — 6360000002 HC RX W HCPCS

## 2024-08-17 PROCEDURE — 2500000003 HC RX 250 WO HCPCS: Performed by: INTERNAL MEDICINE

## 2024-08-17 PROCEDURE — 6370000000 HC RX 637 (ALT 250 FOR IP)

## 2024-08-17 PROCEDURE — 83735 ASSAY OF MAGNESIUM: CPT

## 2024-08-17 PROCEDURE — 2580000003 HC RX 258

## 2024-08-17 PROCEDURE — 2580000003 HC RX 258: Performed by: INTERNAL MEDICINE

## 2024-08-17 PROCEDURE — 99024 POSTOP FOLLOW-UP VISIT: CPT | Performed by: SURGERY

## 2024-08-17 PROCEDURE — 6370000000 HC RX 637 (ALT 250 FOR IP): Performed by: STUDENT IN AN ORGANIZED HEALTH CARE EDUCATION/TRAINING PROGRAM

## 2024-08-17 PROCEDURE — 2580000003 HC RX 258: Performed by: SURGERY

## 2024-08-17 PROCEDURE — 80053 COMPREHEN METABOLIC PANEL: CPT

## 2024-08-17 PROCEDURE — 1200000000 HC SEMI PRIVATE

## 2024-08-17 PROCEDURE — 6370000000 HC RX 637 (ALT 250 FOR IP): Performed by: INTERNAL MEDICINE

## 2024-08-17 PROCEDURE — 36415 COLL VENOUS BLD VENIPUNCTURE: CPT

## 2024-08-17 PROCEDURE — 2500000003 HC RX 250 WO HCPCS: Performed by: STUDENT IN AN ORGANIZED HEALTH CARE EDUCATION/TRAINING PROGRAM

## 2024-08-17 PROCEDURE — 83036 HEMOGLOBIN GLYCOSYLATED A1C: CPT

## 2024-08-17 PROCEDURE — 6360000002 HC RX W HCPCS: Performed by: SURGERY

## 2024-08-17 PROCEDURE — 85025 COMPLETE CBC W/AUTO DIFF WBC: CPT

## 2024-08-17 RX ORDER — INSULIN LISPRO 100 [IU]/ML
0-8 INJECTION, SOLUTION INTRAVENOUS; SUBCUTANEOUS
Status: DISCONTINUED | OUTPATIENT
Start: 2024-08-17 | End: 2024-08-18

## 2024-08-17 RX ORDER — INSULIN GLARGINE 100 [IU]/ML
10 INJECTION, SOLUTION SUBCUTANEOUS NIGHTLY
Status: DISCONTINUED | OUTPATIENT
Start: 2024-08-17 | End: 2024-08-18

## 2024-08-17 RX ORDER — INSULIN GLARGINE 100 [IU]/ML
20 INJECTION, SOLUTION SUBCUTANEOUS NIGHTLY
Status: DISCONTINUED | OUTPATIENT
Start: 2024-08-17 | End: 2024-08-17

## 2024-08-17 RX ORDER — CALCIUM POLYCARBOPHIL 625 MG 625 MG/1
625 TABLET ORAL DAILY
Status: DISCONTINUED | OUTPATIENT
Start: 2024-08-17 | End: 2024-08-22 | Stop reason: HOSPADM

## 2024-08-17 RX ORDER — INSULIN LISPRO 100 [IU]/ML
0-4 INJECTION, SOLUTION INTRAVENOUS; SUBCUTANEOUS NIGHTLY
Status: DISCONTINUED | OUTPATIENT
Start: 2024-08-17 | End: 2024-08-18 | Stop reason: SDUPTHER

## 2024-08-17 RX ORDER — METOCLOPRAMIDE HYDROCHLORIDE 5 MG/ML
10 INJECTION INTRAMUSCULAR; INTRAVENOUS EVERY 6 HOURS
Status: DISCONTINUED | OUTPATIENT
Start: 2024-08-17 | End: 2024-08-20

## 2024-08-17 RX ORDER — INSULIN GLARGINE 100 [IU]/ML
20 INJECTION, SOLUTION SUBCUTANEOUS
Status: DISCONTINUED | OUTPATIENT
Start: 2024-08-17 | End: 2024-08-17

## 2024-08-17 RX ADMIN — SODIUM CHLORIDE, PRESERVATIVE FREE 40 MG: 5 INJECTION INTRAVENOUS at 09:29

## 2024-08-17 RX ADMIN — VANCOMYCIN HYDROCHLORIDE 250 MG: 250 POWDER, FOR SOLUTION ORAL at 12:07

## 2024-08-17 RX ADMIN — ENOXAPARIN SODIUM 40 MG: 100 INJECTION SUBCUTANEOUS at 09:30

## 2024-08-17 RX ADMIN — SODIUM CHLORIDE: 9 INJECTION, SOLUTION INTRAVENOUS at 06:34

## 2024-08-17 RX ADMIN — METOCLOPRAMIDE HYDROCHLORIDE 10 MG: 5 INJECTION INTRAMUSCULAR; INTRAVENOUS at 18:17

## 2024-08-17 RX ADMIN — ONDANSETRON 4 MG: 2 INJECTION INTRAMUSCULAR; INTRAVENOUS at 14:48

## 2024-08-17 RX ADMIN — METRONIDAZOLE 500 MG: 500 INJECTION, SOLUTION INTRAVENOUS at 22:22

## 2024-08-17 RX ADMIN — SODIUM ZIRCONIUM CYCLOSILICATE 10 G: 10 POWDER, FOR SUSPENSION ORAL at 14:39

## 2024-08-17 RX ADMIN — METRONIDAZOLE 500 MG: 500 INJECTION, SOLUTION INTRAVENOUS at 14:41

## 2024-08-17 RX ADMIN — METRONIDAZOLE 500 MG: 500 INJECTION, SOLUTION INTRAVENOUS at 06:34

## 2024-08-17 RX ADMIN — INSULIN LISPRO 16 UNITS: 100 INJECTION, SOLUTION INTRAVENOUS; SUBCUTANEOUS at 04:21

## 2024-08-17 RX ADMIN — HYDROMORPHONE HYDROCHLORIDE 0.5 MG: 1 INJECTION, SOLUTION INTRAMUSCULAR; INTRAVENOUS; SUBCUTANEOUS at 05:05

## 2024-08-17 RX ADMIN — THIAMINE HYDROCHLORIDE 100 MG: 100 INJECTION, SOLUTION INTRAMUSCULAR; INTRAVENOUS at 09:29

## 2024-08-17 RX ADMIN — TRAZODONE HYDROCHLORIDE 100 MG: 100 TABLET ORAL at 22:00

## 2024-08-17 RX ADMIN — SODIUM CHLORIDE, PRESERVATIVE FREE 40 MG: 5 INJECTION INTRAVENOUS at 22:00

## 2024-08-17 RX ADMIN — HYDROMORPHONE HYDROCHLORIDE 0.5 MG: 1 INJECTION, SOLUTION INTRAMUSCULAR; INTRAVENOUS; SUBCUTANEOUS at 14:38

## 2024-08-17 RX ADMIN — INSULIN GLARGINE 10 UNITS: 100 INJECTION, SOLUTION SUBCUTANEOUS at 22:02

## 2024-08-17 RX ADMIN — CALCIUM POLYCARBOPHIL 625 MG: 625 TABLET ORAL at 14:38

## 2024-08-17 RX ADMIN — VANCOMYCIN HYDROCHLORIDE 250 MG: 250 POWDER, FOR SOLUTION ORAL at 05:49

## 2024-08-17 RX ADMIN — VANCOMYCIN HYDROCHLORIDE 250 MG: 250 POWDER, FOR SOLUTION ORAL at 18:18

## 2024-08-17 RX ADMIN — WATER 2000 MG: 1 INJECTION INTRAMUSCULAR; INTRAVENOUS; SUBCUTANEOUS at 09:29

## 2024-08-17 RX ADMIN — METOCLOPRAMIDE HYDROCHLORIDE 10 MG: 5 INJECTION INTRAMUSCULAR; INTRAVENOUS at 22:00

## 2024-08-17 RX ADMIN — AMLODIPINE BESYLATE 5 MG: 5 TABLET ORAL at 09:30

## 2024-08-17 RX ADMIN — SODIUM BICARBONATE: 84 INJECTION, SOLUTION INTRAVENOUS at 14:39

## 2024-08-17 RX ADMIN — HYDROMORPHONE HYDROCHLORIDE 0.5 MG: 1 INJECTION, SOLUTION INTRAMUSCULAR; INTRAVENOUS; SUBCUTANEOUS at 01:52

## 2024-08-17 RX ADMIN — TRACE ELEMENTS INJECTION 4: 7.4; .75; 98; 151 INJECTION, SOLUTION INTRAVENOUS at 22:17

## 2024-08-17 ASSESSMENT — PAIN DESCRIPTION - LOCATION
LOCATION: ABDOMEN
LOCATION: ABDOMEN

## 2024-08-17 ASSESSMENT — PAIN DESCRIPTION - ORIENTATION
ORIENTATION: ANTERIOR
ORIENTATION: ANTERIOR

## 2024-08-17 ASSESSMENT — PAIN DESCRIPTION - FREQUENCY
FREQUENCY: INTERMITTENT
FREQUENCY: INTERMITTENT

## 2024-08-17 ASSESSMENT — PAIN DESCRIPTION - PAIN TYPE
TYPE: SURGICAL PAIN
TYPE: SURGICAL PAIN

## 2024-08-17 ASSESSMENT — PAIN SCALES - GENERAL
PAINLEVEL_OUTOF10: 7
PAINLEVEL_OUTOF10: 7
PAINLEVEL_OUTOF10: 2
PAINLEVEL_OUTOF10: 9
PAINLEVEL_OUTOF10: 2

## 2024-08-17 ASSESSMENT — PAIN DESCRIPTION - ONSET
ONSET: ON-GOING
ONSET: ON-GOING

## 2024-08-17 ASSESSMENT — PAIN DESCRIPTION - DESCRIPTORS
DESCRIPTORS: ACHING
DESCRIPTORS: ACHING

## 2024-08-17 NOTE — PROGRESS NOTES
V2.0    [facility] Progress Note      Name:  Agustina Fried /Age/Sex: 1960  (64 y.o. female)   MRN & CSN:  [id number] & [id number] Encounter Date/Time: 2024 9:46 AM EDT   Location:  5319/5319-01 PCP: Viridiana Blanco APRN - NP     Attending:Zahida Koenig MD       Hospital Day: 16    Assessment and Recommendations   Agustina Fried is a 64 y.o. female with pmh of CVA, depression, anxiety, diabetes, hypertension, heart failure, CHF, renal, breast cancer, colon cancer who presents with nausea vomiting and rectal bleeding.  CT showed severe gastric distention.  She underwent a EGD which showed duodenal mass resulting in obstruction.  Patient was transferred to [facility] for surgery oncology input.  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  Possibly can wean off TPN today.  Will discuss with  -s/p diagnostic laparoscopy with exploratory laparotomy gastrojejunal bypass with feeding tube placement and lysis of additions on 2024   -CT showed a 6 cm area of necrotic mass along the duodenum which abuts the hepatic flexure of the colon  -EGD showed duodenal compression with a large mass obstructing that could not be bypassed with the scope  -Colonoscopy on  showed bleeding mass, suggestive external growth into the colon  -Pathology showed high-grade adenocarcinoma  -GI oncology surgery consulted  -Continue decompression  -Nutrition through TPN    Hypertension-blood pressure improved  -Continue clonidine patch   -Amlodipine ordered      Hyperglycemia-A1c in the chart varies from 12 to 5.9, repeat A1c ordered.  Lantus started, low-dose.  If TPN is stopped, anticipate blood sugars to drop   -Getting 48 units insulin through TPN but blood sugar still on the  04/18/2024 04:40 PM    WBCUA 21-50 08/16/2024 04:35 PM    RBCUA 21-50 08/16/2024 04:35 PM    YEAST Present 08/16/2024 04:35 PM    BACTERIA 1+ 08/16/2024 04:35 PM    CLARITYU CLOUDY 08/16/2024 04:35 PM    SPECGRAV 1.010 01/06/2013 12:11 AM    LEUKOCYTESUR MODERATE 08/16/2024 04:35 PM    UROBILINOGEN 0.2 08/16/2024 04:35 PM    BILIRUBINUR Negative 08/16/2024 04:35 PM    BLOODU MODERATE 08/16/2024 04:35 PM    GLUCOSEU Negative 08/16/2024 04:35 PM    GLUCOSEU >=1000 mg/dL 06/07/2010 03:38 PM    KETUA Negative 08/16/2024 04:35 PM    AMORPHOUS 3+ 04/18/2024 04:40 PM     Urine Cultures:   Lab Results   Component Value Date/Time    LABURIN  07/31/2024 04:34 AM     >100,000 CFU/ml  CONTACT PRECAUTIONS INDICATED  Carbapenem antibiotics are the best option for infections caused  by ESBL producing organisms.  Other antibiotic classes are  likely to result in treatment failure, even for organisms  demonstrating in vitro susceptibility.       Blood Cultures:   Lab Results   Component Value Date/Time    BC No Growth after 4 days of incubation. 04/17/2024 06:19 AM     Lab Results   Component Value Date/Time    BLOODCULT2 No Growth after 4 days of incubation. 04/17/2024 06:19 AM     Organism:   Lab Results   Component Value Date/Time    ORG C. difficile toxin B gene detected 08/01/2024 12:00 PM         Electronically signed by Zahida Koenig MD on 8/17/2024 at 9:22 AM

## 2024-08-17 NOTE — PROGRESS NOTES
Dooley inserted @1500 per physician teams. Pt UA already positive for bacteria.  Rn flushed GJ tube and pt vomitted about 500ml. Rn gave zofran and let MD know. Tube feed stopped per MD. G and J tube are both set up to gravity. No other needs at this time.

## 2024-08-17 NOTE — PROGRESS NOTES
Surgery interval update    Reviewed labs including UA further. Petersen not placed yesterday given nursing policies on CAUTI. Patient with unmeasurable urine output given combination of incontinence, severe diarrhea, and inability to use purewick given severe liquid stool and device not capturing urine.     Agree with nephrology recommendations for petersen placement to better quantify intake and output in setting of worsening renal function compared to earlier this week.     G tube aspirated with return of 60cc of tube feed/gastric output. Confirmed patent, flushed and returned to gravity.  J port flushed easily without resistance. Aspirated with return of minimal water and air then flushed again.     UA also discussed with ID who do not believe results are consistent with UTI. Will stop ancef.     Ok to stop TPN today. Will monitor output from g tube and consider x ray of abdomen to assess location of j lumen of feeding tube.         Carla Wagner MD  PGY3, General Surgery  08/17/24  1:43 PM  [employer]  [phone]

## 2024-08-17 NOTE — CONSULTS
[facility] - Clinical Pharmacy Note    Pharmacy was consulted by Brigitte Nichols to dose TPN.    TPN therapy has been discontinued with tube feeds at goal rate per RD recs. Pharmacy will sign off at this time. Please consider consulting pharmacy again if TPN is re-started.    Thank you for allowing us to participate in the care of this patient.    Please call with any cindyitons,  Zeyad CunninghamD  PGY1 Pharmacy Resident  Wireless: [phone]  8/17/2024 10:00 AM

## 2024-08-17 NOTE — PLAN OF CARE
Problem: Safety - Adult  Goal: Free from fall injury  8/17/2024 1010 by Valentin Van RN  Outcome: Progressing  8/16/2024 2222 by Cinthya Shankar RN  Outcome: Progressing     Problem: Chronic Conditions and Co-morbidities  Goal: Patient's chronic conditions and co-morbidity symptoms are monitored and maintained or improved  8/17/2024 1010 by Valentin Van RN  Outcome: Progressing  8/16/2024 2222 by Cinthya Shankar RN  Outcome: Progressing     Problem: Discharge Planning  Goal: Discharge to home or other facility with appropriate resources  8/17/2024 1010 by Valentin Van RN  Outcome: Progressing  8/16/2024 2222 by Cinthya Shankar RN  Outcome: Progressing     Problem: ABCDS Injury Assessment  Goal: Absence of physical injury  8/16/2024 2222 by Cinthya Shankar RN  Outcome: Progressing     Problem: Nutrition Deficit:  Goal: Optimize nutritional status  8/16/2024 2222 by Cinthya Shankar RN  Outcome: Progressing

## 2024-08-17 NOTE — CONSULTS
Nutrition Assessment     RECOMMENDATION:    PO diet: NPO with sips of clears and popsicles per MD  Nutrition Support:  A. EN:  Continue Enteral Formula: Glucerna 1.5 (Diabetic ) at 40 ml/hr  Continue to advance by 10 ml Q4 hrs to goal rate of 45 mL/hr to best meet needs  Water Flushes: 30ml q4 (Maintenance)  B. TPN:   Recommend cut TPN Clinimix 5/20 to rate of 35 ml/hr and if EN is tolerated at goal rate today, D/C TPN tomorrow 8/18     NUTRITION ASSESSMENT:   Nutritional summary & status: Consult for EN recommmendations. Patient advance to 40 ml/hr this morning, goal is 45 ml/hr. TPN has been running at full rate as EN slowly advanced to ensure tolerance. RD recommends cutting TPN rate in half tonight and d/c tomorrow. Pt with altered labs, noted hyponatremia and hyperglycermia- Na+ Bicarb started yesterday. Bowels moving. RD will continue to monitor EN tolerance and nutritional status.     Admission/PMH: Duodenal mass // hypertension, diabetes, GERD, hyperlipidemia and CKD stage IIIb    MALNUTRITION ASSESSMENT  Chronic Illness  Malnutrition Status: At risk for malnutrition (significant wt loss)  Findings of the 6 clinical characteristics of malnutrition:  Energy Intake:  Mild decrease in energy intake (prior to admit, timeframe unknown)  Weight Loss:  Greater than 5% over 1 month (9.5% in 1 month)     Body Fat Loss:  Unable to assess     Muscle Mass Loss:  Unable to assess      CURRENT NUTRITION THERAPIES  Diet NPO Exceptions are: Ice Chips, Sips of Clear Liquids, Popsicles  ADULT TUBE FEEDING; PEG/J; Diabetic; Continuous; 30; No; 30; Q 4 hours  Current Tube Feeding (TF) Orders:  Feeding Route: Jejunostomy  Formula: Diabetic  Schedule: Continuous  Additives/Modulars: None  Water Flushes: 30 q4  Goal TF & Flush Orders Provides: Glucerna 1.5 at goal rate of 45 ml/hr will provide 1080 ml TV, 1620 kcal, 89 gm protein and 820 ml free water  Parenteral Nutrition Orders:  Recommend decrease rate to 35 ml/hr today  PO  Intake: NPO   PO Supplement Intake:NPO  Additional Sources of Calories/IVF:NaBicarb @75     COMPARATIVE STANDARDS  Energy (kcal):  4798-3678 (20-22 kcal/kg CBW)     Protein (g):  75-87 (1.3-1.5 gm/kg IBW)       Fluid (mL/day):  7480-3721 ml    Belinda Riggs RD  [place]:  [phone]  Office:  [phone]

## 2024-08-17 NOTE — PROGRESS NOTES
General Surgery  Progress Note      Procedure(s) Performed: Diagnostic laparoscopy, ex-lap, jose-en-y gastrojejunostomy bypass, feeding tube placement, and extensive lysis of adhesions.     Subjective:   NAEON. Tolerating feeds at 30 cc/hr.     Objective:    Vitals:   Vitals:    08/17/24 0222 08/17/24 0329 08/17/24 0505 08/17/24 0535   BP:  (!) 165/53     Pulse:  84     Resp: 16 16 16 16   Temp:  98.2 °F (36.8 °C)     TempSrc:  Oral     SpO2:  100%     Weight:       Height:           Physical Exam:  General appearance: alert, no acute distress  Chest/Lungs: Normal effort with no accessory muscle use  Cardiovascular: RRR,  well perfused  Abdomen: Soft, non-distended, appropriately tender. G-tube venting to drainage bag, J-tube with tube feeds at 30 cc/hr  Skin: Warm and dry, no rashes  Extremities: No edema, no cyanosis  Neuro: A&Ox3, no focal deficits, sensation intact    Assessment and Plan  This is a 64 y.o. year old female status post diagnostic laparoscopy, ex-lap, jose-en-y gastrojejunostomy bypass, feeding tube placement, and extensive lysis of adhesions secondary to duodenal obstruction. (8/11/24).    - Discussed with nurse that TUBE FEEDS & MEDS ALLOWED VIA JEJUNOSTOMY tube. GASTRIC TUBE only to DRAINAGE BAG  - PLEASE FLUSH BOTH JEJUNOSTOMY AND GASTRIC TUBE BEFORE AND AFTER ADMINISTRATION TO PREVENT CLOGGING  - MMPC  - Reach out to medicine for hyperglycemia management  - IS ordered to bedside, encourage hourly IS and deep breathing  - Okay for NPO sips of clears/chips  - F/u with dietary for TPN  - Increase TF to 40 goal with TPN  - Dooley was inserted yesterday for worsening kidney failure. Will follow nephrology recs.  - PT/OT, OOB, ambulate in halls    Cindy Owusu MD  PGY1, General Surgery  08/17/24  6:33 AM  [phone]     I have seen, examined, and reviewed the patients chart. I agree with the residents assessment and have made appropriate changes.    Santino Ontiveros

## 2024-08-17 NOTE — PROGRESS NOTES
Ph: [phone], Fax: [phone]           [facility]               Reason for admission:                 Pain abdomen nausea and vomiting    Brief Summary :     Agustina Fried is being seen by nephrology for CKD       Interval History and plan:     Seen in room  BP is labile but better controlled    Urine is 158 ml  + +  Creatinine is 1.2 > 1.4> 1.7 > 2.0  SP extensive surgery  ( Diagnostic laparoscopy, ex-lap, jose-en-y gastrojejunostomy bypass, feeding tube placement, and extensive lysis of adhesions )    Continue clonidine patch to 0.3 mg q weekly and amlodipine   Place a Dooley catheter  Bicarb is 11 up from 7 on Bicarb drip and increased concentration.   K trending down.  Ordered Lokelma.                      Assessment :       CKD Stage IIIa  Creatinine has been variable in the past she has a history of recurrent SHARRON  Last creatinine was 2 upon discharge from 7/2 hospitalization  she has history of diabetes mellitus hypertension    CHF  Echo (7/2/24)    Limited only for LVEF and RVF.    Image quality is adequate.    Left Ventricle: Normal left ventricular systolic function with a visually estimated EF of 55 - 60%. Normal wall motion.    Right ventricle size is normal. Normal systolic function.    Does not appear volume overloaded      Hypertension   BP: (120-123)/(56-57)  Pulse:  [82-83]   BP goal inpatient 130-140 systolic inpatient        [facility] would like to thank Zahida Koenig MD   for opportunity to serve this patient      Please call with questions at-   24 Hrs Answering service [phone] or  7 am- 5 pm via Perfect serve or cell phone  Dr.Ian MARCIANO Talamantes, DO       HPI :     Agustina Fried is a 64 y.o. female presented to   the hospital on 8/2/2024 with  pain abdomen nausea and vomiting.  She is known to have nausea vomiting diarrhea for several days because of which she came to the hospital.  She needed NG tube placement in the emergency room.  Found to have  severe gastric distention.  She is known to have CKD and has had multiple hospitalization in the past        PMH/PSH/SH/Family History:     Past Medical History:   Diagnosis Date    Abnormal brain MRI 7/20/2017    Partially empty sella and minimal chronic small vessel ischemic disease    Acute bilateral low back pain without sciatica 11/2/2016    SHARRON (acute kidney injury) ([facility]) 7/5/2017    Arthritis     back    Bipolar disorder ([facility]) 10/18/2008    CAD (coronary artery disease)     stent placed 6/8/20    Cancer ([facility]) 2015    bilateral breast:s/p lumpectomy/radiation:under care care of breast specialist:Dr. Boone     Carotid stenosis, bilateral:<50%:per US 7/2016 7/15/2016    Carpal tunnel syndrome 10/18/2008    Cervical cancer screening 2014    Nml per pt'.    Coronary artery disease of native artery of native heart with stable angina pectoris ([facility]) 6/9/2020    DDD (degenerative disc disease), lumbar 7/18/2018    Depression     under care of pschiatrist:Dr. 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 myelopathy or radiculopathy 3/10/2017    Transient cerebral ischemia 07/15/2016    TIA:7/10/16    Unspecified cerebral artery occlusion with cerebral infarction     TIA          Medication:     Current Facility-Administered Medications: insulin glargine (LANTUS) injection vial 10 Units, 10  Units, SubCUTAneous, Nightly  insulin lispro (HUMALOG,ADMELOG) injection vial 0-8 Units, 0-8 Units, SubCUTAneous, TID WC  insulin lispro (HUMALOG,ADMELOG) injection vial 0-4 Units, 0-4 Units, SubCUTAneous, Nightly  polycarbophil (FIBERCON) tablet 625 mg, 625 mg, Oral, Daily  sodium bicarbonate 75 mEq in sodium chloride 0.45 % 1,000 mL infusion, , IntraVENous, Continuous  amLODIPine (NORVASC) tablet 5 mg, 5 mg, Oral, Daily  [Held by provider] atorvastatin (LIPITOR) tablet 40 mg, 40 mg, Oral, Nightly  [Held by provider] gabapentin (NEURONTIN) capsule 200 mg, 200 mg, Oral, BID  traZODone (DESYREL) tablet 100 mg, 100 mg, Oral, Nightly  metroNIDAZOLE (FLAGYL) 500 mg in 0.9% NaCl 100 mL IVPB premix, 500 mg, IntraVENous, Q8H  vancomycin (FIRVANQ) 50 MG/ML oral solution 250 mg, 250 mg, Per J Tube, 4 times per day  0.9 % sodium chloride infusion, , IntraVENous, PRN  0.9 % sodium chloride infusion, , IntraVENous, PRN  cloNIDine (CATAPRES) 0.3 MG/24HR 1 patch, 1 patch, TransDERmal, Weekly  phenol 1.4 % mouth spray 1 spray, 1 spray, Mouth/Throat, Q2H PRN  pantoprazole (PROTONIX) 40 mg in sodium chloride (PF) 0.9 % 10 mL injection, 40 mg, IntraVENous, Q12H  sodium chloride flush 0.9 % injection 5-40 mL, 5-40 mL, IntraVENous, 2 times per day  sodium chloride flush 0.9 % injection 5-40 mL, 5-40 mL, IntraVENous, PRN  0.9 % sodium chloride infusion, , IntraVENous, PRN  enoxaparin (LOVENOX) injection 40 mg, 40 mg, SubCUTAneous, Daily  thiamine (B-1) injection 100 mg, 100 mg, IntraVENous, Daily  HYDROmorphone (DILAUDID) injection 0.25 mg, 0.25 mg, IntraVENous, Q3H PRN **OR** HYDROmorphone (DILAUDID) injection 0.5 mg, 0.5 mg, IntraVENous, Q3H PRN  sodium chloride flush 0.9 % injection 5-40 mL, 5-40 mL, IntraVENous, 2 times per day  sodium chloride flush 0.9 % injection 5-40 mL, 5-40 mL, IntraVENous, PRN  0.9 % sodium chloride infusion, , IntraVENous, PRN  ondansetron (ZOFRAN-ODT) disintegrating tablet 4 mg, 4 mg, Oral, Q8H PRN **OR**  ondansetron (ZOFRAN) injection 4 mg, 4 mg, IntraVENous, Q6H PRN  polyethylene glycol (GLYCOLAX) packet 17 g, 17 g, Oral, Daily PRN  acetaminophen (TYLENOL) tablet 650 mg, 650 mg, Oral, Q6H PRN **OR** acetaminophen (TYLENOL) suppository 650 mg, 650 mg, Rectal, Q6H PRN  glucose chewable tablet 16 g, 4 tablet, Oral, PRN  dextrose bolus 10% 125 mL, 125 mL, IntraVENous, PRN **OR** dextrose bolus 10% 250 mL, 250 mL, IntraVENous, PRN  glucagon injection 1 mg, 1 mg, SubCUTAneous, PRN  dextrose 10 % infusion, , IntraVENous, Continuous PRN    Vitals :     Vitals:    08/17/24 1209   BP: (!) 123/57   Pulse: 83   Resp: 16   Temp: 98.2 °F (36.8 °C)   SpO2: 97%          Physical Examination :     Appearance: Alert, awake. NAD.   Respiratory:  No RD on room air.   Cardiovascular: Edema none  Abdomen:  soft  Other relevant findings: NG tube placed.    Labs :     CBC:   Recent Labs     08/15/24  0719 08/16/24  0432 08/17/24  0500   WBC 13.5* 11.3* 10.1   HGB 10.7* 10.5* 10.0*   HCT 33.2* 33.9* 31.8*    139 130*     BMP:    Recent Labs     08/15/24  0710 08/16/24  0432 08/16/24  0948 08/17/24  0500 08/17/24  0945    134* 134* 134* 138   K 4.5 5.7* 5.6*  5.6* 5.7* 5.4*   * 116* 114* 115* 119*   CO2 18* 7* 10* 11* 11*   BUN 37* 36* 39* 46* 49*   CREATININE 1.6* 1.7* 1.7* 1.8* 2.0*   GLUCOSE 154* 420* 403* 477* 63*   MG 2.10 2.20  --  2.30  --    PHOS 3.0 3.6 3.7 3.5  --      Lab Results   Component Value Date/Time    COLORU Yellow 08/16/2024 04:35 PM    NITRU Negative 08/16/2024 04:35 PM    GLUCOSEU Negative 08/16/2024 04:35 PM    GLUCOSEU >=1000 mg/dL 06/07/2010 03:38 PM    KETUA Negative 08/16/2024 04:35 PM    UROBILINOGEN 0.2 08/16/2024 04:35 PM    BILIRUBINUR Negative 08/16/2024 04:35 PM        ----------------------------------------------------------  Please call with questions at      24 Hrs Answering service [phone]  Perfect serve, or cell phone 7 am - 5pm  Glen Plascencia MD

## 2024-08-17 NOTE — PLAN OF CARE
Problem: Safety - Adult  Goal: Free from fall injury  8/16/2024 2222 by Cinthya Shankar RN  Outcome: Progressing  8/16/2024 1407 by Valentin Van RN  Outcome: Progressing     Problem: Chronic Conditions and Co-morbidities  Goal: Patient's chronic conditions and co-morbidity symptoms are monitored and maintained or improved  8/16/2024 2222 by Cinthya Shankar RN  Outcome: Progressing  8/16/2024 1407 by Valentin Van RN  Outcome: Progressing     Problem: Discharge Planning  Goal: Discharge to home or other facility with appropriate resources  8/16/2024 2222 by Cinthya Shankar RN  Outcome: Progressing  8/16/2024 1407 by Valentin Van RN  Outcome: Progressing     Problem: ABCDS Injury Assessment  Goal: Absence of physical injury  Outcome: Progressing     Problem: Nutrition Deficit:  Goal: Optimize nutritional status  Outcome: Progressing

## 2024-08-17 NOTE — PROGRESS NOTES
[facility] -  Clinical Pharmacy Note    Parenteral Nutrition - Management by Pharmacy    Consult Date(s): 8/4/24 and 8/17/24 (DC/ reinitiate same day)  Consulting Provider(s): Dr Butler/ Dr Fairbanks    Assessment / Plan  1)  Gastric outlet obstruction 2/2 ulcerated mass, ileus - Parenteral Nutrition  Day of Therapy: 14  Formulation:  Clinimix E 5/20  Clinimix Rate: Changing to nocturnal cyclic TPN per surgery team  Cycle TPN over 10 hours from 8 pm to 6 am.  80 mL/hr x 1 hr (2569-5326)  190 mL/hr x 8 hrs (2959-6473)  80 mL/hr x 1 hr (5634-3156)  Total volume = 1680 mL/day - at goal  Lipids:  20% 250mL over 12 hours on Mon & Thurs only (due to national shortage)  TPN discontinued this AM given TF at goal rate. Patient vomited this afternoon and surgery would like to reinitiate TPN and hold TF. Will reassess dietary plan moving forward  Appreciate Clinical Dietitian's recommendations for formulation and goal rate.  Electrolytes:  Clinimix-E includes standard electrolyte formulation.  Recommend further repletion with supplemental IVPBs as needed.  Potassium elevated to 5.7 today but specimen hemolyzed. Mg and Phos remain WNL.  Maintenance IVF:  n/a  Glucose control:    Pt has h/o DM.  Takes Lantus 8 units daily + Humalog 4 units TID with meals at home.  Glucoses ranged 357-420 since TPN bag hung last evening. Dr. Koenig called this morning asking to increase the insulin in the TPN which then got Dc'd. After TPN reordered, discussed with Dr. Koenig again regarding TPN insulin plan. Patient BGs WNL prior to TF with Clinimix E 5/20 1680 mL/day 8/13 last TPN without TF. 8/13 bag had 42 units / day regular insulin to be given.  Will continue Regular insulin to 40 units to be given / day (48 units added to bag) in TPN bag today given no glucose from TF.  Patient has sliding scale ordered and 10 units insulin glargine with hold parameters. Will re-evaluate insulin plan 8/18 with Dr. Koenig.  Will monitor glucoses and  yesterday: 0 units (held by provider)    Recent Labs     08/16/24  0432 08/17/24  0500   WBC 11.3* 10.1   HGB 10.5* 10.0*    130*     Triglycerides   Date Value Ref Range Status   08/15/2024 36 0 - 150 mg/dL Final   08/11/2024 20 0 - 150 mg/dL Final   08/06/2024 88 0 - 150 mg/dL Final   08/05/2024 91 0 - 150 mg/dL Final   08/04/2024 70 0 - 150 mg/dL Final

## 2024-08-18 ENCOUNTER — APPOINTMENT (OUTPATIENT)
Dept: ULTRASOUND IMAGING | Age: 64
End: 2024-08-18
Attending: SURGERY
Payer: MEDICAID

## 2024-08-18 LAB
ALBUMIN SERPL-MCNC: 2.3 G/DL (ref 3.4–5)
ANION GAP SERPL CALCULATED.3IONS-SCNC: 10 MMOL/L (ref 3–16)
ANION GAP SERPL CALCULATED.3IONS-SCNC: 10 MMOL/L (ref 3–16)
ANION GAP SERPL CALCULATED.3IONS-SCNC: 12 MMOL/L (ref 3–16)
BASOPHILS # BLD: 0 K/UL (ref 0–0.2)
BASOPHILS NFR BLD: 0.2 %
BUN SERPL-MCNC: 56 MG/DL (ref 7–20)
BUN SERPL-MCNC: 58 MG/DL (ref 7–20)
BUN SERPL-MCNC: 59 MG/DL (ref 7–20)
CALCIUM SERPL-MCNC: 7.9 MG/DL (ref 8.3–10.6)
CALCIUM SERPL-MCNC: 8 MG/DL (ref 8.3–10.6)
CALCIUM SERPL-MCNC: 8 MG/DL (ref 8.3–10.6)
CHLORIDE SERPL-SCNC: 113 MMOL/L (ref 99–110)
CHLORIDE SERPL-SCNC: 114 MMOL/L (ref 99–110)
CHLORIDE SERPL-SCNC: 116 MMOL/L (ref 99–110)
CO2 SERPL-SCNC: 10 MMOL/L (ref 21–32)
CO2 SERPL-SCNC: 12 MMOL/L (ref 21–32)
CO2 SERPL-SCNC: 13 MMOL/L (ref 21–32)
CREAT SERPL-MCNC: 2.2 MG/DL (ref 0.6–1.2)
CREAT SERPL-MCNC: 2.2 MG/DL (ref 0.6–1.2)
CREAT SERPL-MCNC: 2.3 MG/DL (ref 0.6–1.2)
CRP SERPL-MCNC: 15.5 MG/L (ref 0–5.1)
DEPRECATED RDW RBC AUTO: 15.6 % (ref 12.4–15.4)
EOSINOPHIL # BLD: 0.1 K/UL (ref 0–0.6)
EOSINOPHIL NFR BLD: 0.6 %
EST. AVERAGE GLUCOSE BLD GHB EST-MCNC: 114 MG/DL
GFR SERPLBLD CREATININE-BSD FMLA CKD-EPI: 23 ML/MIN/{1.73_M2}
GFR SERPLBLD CREATININE-BSD FMLA CKD-EPI: 24 ML/MIN/{1.73_M2}
GFR SERPLBLD CREATININE-BSD FMLA CKD-EPI: 24 ML/MIN/{1.73_M2}
GLUCOSE BLD-MCNC: 139 MG/DL (ref 70–99)
GLUCOSE BLD-MCNC: 203 MG/DL (ref 70–99)
GLUCOSE BLD-MCNC: 49 MG/DL (ref 70–99)
GLUCOSE BLD-MCNC: 506 MG/DL (ref 70–99)
GLUCOSE BLD-MCNC: 60 MG/DL (ref 70–99)
GLUCOSE SERPL-MCNC: 182 MG/DL (ref 70–99)
GLUCOSE SERPL-MCNC: 419 MG/DL (ref 70–99)
GLUCOSE SERPL-MCNC: 430 MG/DL (ref 70–99)
HBA1C MFR BLD: 5.6 %
HCT VFR BLD AUTO: 28.2 % (ref 36–48)
HGB BLD-MCNC: 9.1 G/DL (ref 12–16)
LYMPHOCYTES # BLD: 1 K/UL (ref 1–5.1)
LYMPHOCYTES NFR BLD: 9.9 %
MAGNESIUM SERPL-MCNC: 2.3 MG/DL (ref 1.8–2.4)
MCH RBC QN AUTO: 30.1 PG (ref 26–34)
MCHC RBC AUTO-ENTMCNC: 32.2 G/DL (ref 31–36)
MCV RBC AUTO: 93.3 FL (ref 80–100)
MONOCYTES # BLD: 0.6 K/UL (ref 0–1.3)
MONOCYTES NFR BLD: 5.6 %
NEUTROPHILS # BLD: 8.3 K/UL (ref 1.7–7.7)
NEUTROPHILS NFR BLD: 83.7 %
PERFORMED ON: ABNORMAL
PHOSPHATE SERPL-MCNC: 3.9 MG/DL (ref 2.5–4.9)
PLATELET # BLD AUTO: 122 K/UL (ref 135–450)
PMV BLD AUTO: 9.5 FL (ref 5–10.5)
POTASSIUM SERPL-SCNC: 4.9 MMOL/L (ref 3.5–5.1)
POTASSIUM SERPL-SCNC: 5.5 MMOL/L (ref 3.5–5.1)
POTASSIUM SERPL-SCNC: 5.5 MMOL/L (ref 3.5–5.1)
PREALB SERPL-MCNC: 9 MG/DL (ref 20–40)
RBC # BLD AUTO: 3.02 M/UL (ref 4–5.2)
SODIUM SERPL-SCNC: 135 MMOL/L (ref 136–145)
SODIUM SERPL-SCNC: 136 MMOL/L (ref 136–145)
SODIUM SERPL-SCNC: 139 MMOL/L (ref 136–145)
SODIUM UR-SCNC: 36 MMOL/L
TRANSFERRIN SERPL-MCNC: 104 MG/DL (ref 200–360)
TRIGL SERPL-MCNC: 34 MG/DL (ref 0–150)
WBC # BLD AUTO: 9.9 K/UL (ref 4–11)

## 2024-08-18 PROCEDURE — 83735 ASSAY OF MAGNESIUM: CPT

## 2024-08-18 PROCEDURE — 6370000000 HC RX 637 (ALT 250 FOR IP)

## 2024-08-18 PROCEDURE — 36415 COLL VENOUS BLD VENIPUNCTURE: CPT

## 2024-08-18 PROCEDURE — 2580000003 HC RX 258: Performed by: INTERNAL MEDICINE

## 2024-08-18 PROCEDURE — 6370000000 HC RX 637 (ALT 250 FOR IP): Performed by: INTERNAL MEDICINE

## 2024-08-18 PROCEDURE — 86140 C-REACTIVE PROTEIN: CPT

## 2024-08-18 PROCEDURE — 6360000002 HC RX W HCPCS

## 2024-08-18 PROCEDURE — 2500000003 HC RX 250 WO HCPCS

## 2024-08-18 PROCEDURE — 2500000003 HC RX 250 WO HCPCS: Performed by: INTERNAL MEDICINE

## 2024-08-18 PROCEDURE — 84466 ASSAY OF TRANSFERRIN: CPT

## 2024-08-18 PROCEDURE — 85025 COMPLETE CBC W/AUTO DIFF WBC: CPT

## 2024-08-18 PROCEDURE — 1200000000 HC SEMI PRIVATE

## 2024-08-18 PROCEDURE — 84300 ASSAY OF URINE SODIUM: CPT

## 2024-08-18 PROCEDURE — 84134 ASSAY OF PREALBUMIN: CPT

## 2024-08-18 PROCEDURE — 76770 US EXAM ABDO BACK WALL COMP: CPT

## 2024-08-18 PROCEDURE — 80069 RENAL FUNCTION PANEL: CPT

## 2024-08-18 PROCEDURE — 2580000003 HC RX 258

## 2024-08-18 PROCEDURE — 84478 ASSAY OF TRIGLYCERIDES: CPT

## 2024-08-18 RX ORDER — INSULIN LISPRO 100 [IU]/ML
0-8 INJECTION, SOLUTION INTRAVENOUS; SUBCUTANEOUS EVERY 4 HOURS
Status: DISCONTINUED | OUTPATIENT
Start: 2024-08-18 | End: 2024-08-22 | Stop reason: HOSPADM

## 2024-08-18 RX ADMIN — CALCIUM POLYCARBOPHIL 625 MG: 625 TABLET ORAL at 09:27

## 2024-08-18 RX ADMIN — METOCLOPRAMIDE HYDROCHLORIDE 10 MG: 5 INJECTION INTRAMUSCULAR; INTRAVENOUS at 04:14

## 2024-08-18 RX ADMIN — VANCOMYCIN HYDROCHLORIDE 250 MG: 250 POWDER, FOR SOLUTION ORAL at 01:22

## 2024-08-18 RX ADMIN — HYDROMORPHONE HYDROCHLORIDE 0.5 MG: 1 INJECTION, SOLUTION INTRAMUSCULAR; INTRAVENOUS; SUBCUTANEOUS at 01:22

## 2024-08-18 RX ADMIN — SODIUM ZIRCONIUM CYCLOSILICATE 10 G: 10 POWDER, FOR SUSPENSION ORAL at 12:29

## 2024-08-18 RX ADMIN — HYDROMORPHONE HYDROCHLORIDE 0.5 MG: 1 INJECTION, SOLUTION INTRAMUSCULAR; INTRAVENOUS; SUBCUTANEOUS at 12:29

## 2024-08-18 RX ADMIN — THIAMINE HYDROCHLORIDE 100 MG: 100 INJECTION, SOLUTION INTRAMUSCULAR; INTRAVENOUS at 09:26

## 2024-08-18 RX ADMIN — METRONIDAZOLE 500 MG: 500 INJECTION, SOLUTION INTRAVENOUS at 15:52

## 2024-08-18 RX ADMIN — SODIUM CHLORIDE, PRESERVATIVE FREE 40 MG: 5 INJECTION INTRAVENOUS at 09:26

## 2024-08-18 RX ADMIN — INSULIN LISPRO 2 UNITS: 100 INJECTION, SOLUTION INTRAVENOUS; SUBCUTANEOUS at 12:15

## 2024-08-18 RX ADMIN — METOCLOPRAMIDE HYDROCHLORIDE 10 MG: 5 INJECTION INTRAMUSCULAR; INTRAVENOUS at 15:52

## 2024-08-18 RX ADMIN — CALCIUM GLUCONATE: 98 INJECTION, SOLUTION INTRAVENOUS at 17:58

## 2024-08-18 RX ADMIN — SODIUM BICARBONATE: 84 INJECTION, SOLUTION INTRAVENOUS at 06:39

## 2024-08-18 RX ADMIN — METRONIDAZOLE 500 MG: 500 INJECTION, SOLUTION INTRAVENOUS at 22:24

## 2024-08-18 RX ADMIN — INSULIN LISPRO 8 UNITS: 100 INJECTION, SOLUTION INTRAVENOUS; SUBCUTANEOUS at 09:33

## 2024-08-18 RX ADMIN — HYDROMORPHONE HYDROCHLORIDE 0.5 MG: 1 INJECTION, SOLUTION INTRAMUSCULAR; INTRAVENOUS; SUBCUTANEOUS at 17:59

## 2024-08-18 RX ADMIN — VANCOMYCIN HYDROCHLORIDE 250 MG: 250 POWDER, FOR SOLUTION ORAL at 06:34

## 2024-08-18 RX ADMIN — SODIUM CHLORIDE, PRESERVATIVE FREE 40 MG: 5 INJECTION INTRAVENOUS at 22:02

## 2024-08-18 RX ADMIN — HYDROMORPHONE HYDROCHLORIDE 0.5 MG: 1 INJECTION, SOLUTION INTRAMUSCULAR; INTRAVENOUS; SUBCUTANEOUS at 22:20

## 2024-08-18 RX ADMIN — METOCLOPRAMIDE HYDROCHLORIDE 10 MG: 5 INJECTION INTRAMUSCULAR; INTRAVENOUS at 09:27

## 2024-08-18 RX ADMIN — VANCOMYCIN HYDROCHLORIDE 250 MG: 250 POWDER, FOR SOLUTION ORAL at 17:58

## 2024-08-18 RX ADMIN — ENOXAPARIN SODIUM 40 MG: 100 INJECTION SUBCUTANEOUS at 09:26

## 2024-08-18 RX ADMIN — METOCLOPRAMIDE HYDROCHLORIDE 10 MG: 5 INJECTION INTRAMUSCULAR; INTRAVENOUS at 22:02

## 2024-08-18 RX ADMIN — VANCOMYCIN HYDROCHLORIDE 250 MG: 250 POWDER, FOR SOLUTION ORAL at 12:15

## 2024-08-18 RX ADMIN — METRONIDAZOLE 500 MG: 500 INJECTION, SOLUTION INTRAVENOUS at 06:36

## 2024-08-18 RX ADMIN — TRAZODONE HYDROCHLORIDE 100 MG: 100 TABLET ORAL at 22:02

## 2024-08-18 ASSESSMENT — PAIN SCALES - GENERAL
PAINLEVEL_OUTOF10: 7
PAINLEVEL_OUTOF10: 4
PAINLEVEL_OUTOF10: 4
PAINLEVEL_OUTOF10: 8
PAINLEVEL_OUTOF10: 5
PAINLEVEL_OUTOF10: 7
PAINLEVEL_OUTOF10: 8

## 2024-08-18 ASSESSMENT — PAIN DESCRIPTION - DESCRIPTORS
DESCRIPTORS: ACHING

## 2024-08-18 ASSESSMENT — PAIN DESCRIPTION - PAIN TYPE
TYPE: SURGICAL PAIN
TYPE: SURGICAL PAIN

## 2024-08-18 ASSESSMENT — PAIN DESCRIPTION - LOCATION
LOCATION: ABDOMEN

## 2024-08-18 ASSESSMENT — PAIN - FUNCTIONAL ASSESSMENT
PAIN_FUNCTIONAL_ASSESSMENT: ACTIVITIES ARE NOT PREVENTED

## 2024-08-18 ASSESSMENT — PAIN DESCRIPTION - FREQUENCY
FREQUENCY: INTERMITTENT
FREQUENCY: INTERMITTENT

## 2024-08-18 ASSESSMENT — PAIN DESCRIPTION - ORIENTATION
ORIENTATION: ANTERIOR
ORIENTATION: MID
ORIENTATION: MID

## 2024-08-18 ASSESSMENT — PAIN DESCRIPTION - ONSET
ONSET: ON-GOING
ONSET: ON-GOING

## 2024-08-18 NOTE — PROGRESS NOTES
[facility] -  Clinical Pharmacy Note    Parenteral Nutrition - Management by Pharmacy    Consult Date(s): 8/4/24 and 8/17/24 (DC/ reinitiate same day)  Consulting Provider(s): Dr Butler/ Dr Fairbanks    Assessment / Plan  1)  Gastric outlet obstruction 2/2 ulcerated mass, ileus - Parenteral Nutrition  Day of Therapy: 15  Formulation:  Clinimix 5/20 custom  Changing to custom bag given rising K+  Removing all K+ from TPN; all other electrolytes comparable to Clinimix E 5/20  Clinimix Rate:  changing back to continuous given rebound hyperglycemia.   Continue total daily volume 70 ml/hr  Lipids:  20% 250mL over 12 hours on Mon & Thurs only (due to national shortage)  TF not tolerated 8/17. Will reassess dietary plan moving forward  Appreciate Clinical Dietitian's recommendations for formulation and goal rate.  Electrolytes:  Clinimix plain contains Acetate and Chloride (cannot be removed from bag).  Other electrolytes added to today's bag:      Desired Amount per Day (mEq, mmoL) Amount To Add to Bag (mEq, mmoL) Change from Previous Day in Amount per Day (Increase/Decrease)   Sodium Acetate 60 71    Sodium Chloride 0 0    Sodium Phosphate 25 30    Potassium Acetate 0 0    Potassium Phosphate 0 0    Potassium Chloride 0 0    Calcium Gluconate 8 10    Magnesium Sulfate 8 10    MVI (mL) 0 0.0 MWF only   Trace (mL) 1 1.2    Insulin (units) 40 48    Famotidine (mg) 0 0    Other 0 0      Maintenance IVF:  Sodium bicarb 100 mL/hr (15 mEq/hr)  Glucose control:    Pt has h/o DM.  Takes Lantus 8 units daily + Humalog 4 units TID with meals at home.  BG trended down 8/17 and spiked back to 400s-500s near end of last night's TPN  Will continue Regular insulin to 40 units to be given / day (48 units added to bag) in TPN bag.  Recommended to Dr. Koenig to discontinue lantus as TPN will provide 24 hour coverage with continuous infusion. Recommended escalating to high dose sliding scale Q4h  Will monitor glucoses and adjust insulin

## 2024-08-18 NOTE — PROGRESS NOTES
Patient alert to self. Pain medication administered with benefit. TPN and flagyl infusing per orders. Output documented, see flowsheet. Call light within reach. Pt denies needs at this time.     /66   Pulse 74   Temp 98.3 °F (36.8 °C) (Oral)   Resp 16   Ht 1.6 m (5' 2.99\")   Wt 76.7 kg (169 lb)   SpO2 98%   BMI 29.94 kg/m²

## 2024-08-18 NOTE — PROGRESS NOTES
General Surgery  Progress Note      Procedure(s) Performed: Diagnostic laparoscopy, ex-lap, jose-en-y gastrojejunostomy bypass, feeding tube placement, and extensive lysis of adhesions.     Subjective:   GJ to gravity. 675 out. No other acute events. Patient remains at baseline mental status.     Objective:    Vitals:   Vitals:    08/17/24 1926 08/17/24 2300 08/18/24 0152 08/18/24 0410   BP: 106/67 (!) 116/55  126/66   Pulse: 81 67  74   Resp: 16 16 15 16   Temp: 97.9 °F (36.6 °C) 97.7 °F (36.5 °C)  98.3 °F (36.8 °C)   TempSrc: Oral   Oral   SpO2: 98% 92%  98%   Weight:       Height:           Physical Exam:  General appearance: alert, no acute distress  Chest/Lungs: Normal effort with no accessory muscle use  Cardiovascular: RRR,  well perfused  Abdomen: Soft, non-distended, appropriately tender. G-tube venting to drainage bag, J-tube venting to drainage to bag      Assessment and Plan  This is a 64 y.o. year old female status post diagnostic laparoscopy, ex-lap, jose-en-y gastrojejunostomy bypass, feeding tube placement, and extensive lysis of adhesions secondary to duodenal obstruction. (8/11/24).    - G and J tube to gravity with petersen bag. Okay to place medications through G port and clamp for 30 minutes if needed  - MMPC  - Hyperglycemia management per Medicine  - IS ordered to bedside, encourage hourly IS and deep breathing  - Okay for NPO sips of clears/chips  - F/u with dietary for TPN recommendations   - Petersen in place, nephrology recs appreciated regarding increasing cret.    Will obtain repeat renal us and obtain urine lytes today.   - PT/OT, OOB, ambulate in halls    Marlo Butler DO  PGY1, General Surgery  08/18/24  8:40 AM  Heidi  Pager: [phone]     I have seen, examined, and reviewed the patients chart. I agree with the residents assessment and have made appropriate changes.    Santino Ontiveros

## 2024-08-18 NOTE — PROGRESS NOTES
Pt ambulated in room and up to chair majority of the my shift. Pain controlled. VSS bp soft. Rectal tube cdi with output. . Dooley cdi with output. GJ tube cdi with output. Denies nausea.

## 2024-08-19 LAB
ALBUMIN SERPL-MCNC: 1.8 G/DL (ref 3.4–5)
ALBUMIN SERPL-MCNC: 2.1 G/DL (ref 3.4–5)
ALP SERPL-CCNC: 211 U/L (ref 40–129)
ALT SERPL-CCNC: <5 U/L (ref 10–40)
ANION GAP SERPL CALCULATED.3IONS-SCNC: 10 MMOL/L (ref 3–16)
ANION GAP SERPL CALCULATED.3IONS-SCNC: 8 MMOL/L (ref 3–16)
AST SERPL-CCNC: 9 U/L (ref 15–37)
BASOPHILS # BLD: 0.2 K/UL (ref 0–0.2)
BASOPHILS NFR BLD: 2.3 %
BILIRUB DIRECT SERPL-MCNC: 0.2 MG/DL (ref 0–0.3)
BILIRUB INDIRECT SERPL-MCNC: ABNORMAL MG/DL (ref 0–1)
BILIRUB SERPL-MCNC: <0.2 MG/DL (ref 0–1)
BUN SERPL-MCNC: 50 MG/DL (ref 7–20)
BUN SERPL-MCNC: 50 MG/DL (ref 7–20)
CALCIUM SERPL-MCNC: 7.4 MG/DL (ref 8.3–10.6)
CALCIUM SERPL-MCNC: 7.5 MG/DL (ref 8.3–10.6)
CHLORIDE SERPL-SCNC: 101 MMOL/L (ref 99–110)
CHLORIDE SERPL-SCNC: 110 MMOL/L (ref 99–110)
CO2 SERPL-SCNC: 19 MMOL/L (ref 21–32)
CO2 SERPL-SCNC: 22 MMOL/L (ref 21–32)
CREAT SERPL-MCNC: 1.8 MG/DL (ref 0.6–1.2)
CREAT SERPL-MCNC: 1.8 MG/DL (ref 0.6–1.2)
DEPRECATED RDW RBC AUTO: 15.3 % (ref 12.4–15.4)
EOSINOPHIL # BLD: 0.1 K/UL (ref 0–0.6)
EOSINOPHIL NFR BLD: 1.1 %
GFR SERPLBLD CREATININE-BSD FMLA CKD-EPI: 31 ML/MIN/{1.73_M2}
GFR SERPLBLD CREATININE-BSD FMLA CKD-EPI: 31 ML/MIN/{1.73_M2}
GLUCOSE BLD-MCNC: 143 MG/DL (ref 70–99)
GLUCOSE BLD-MCNC: 173 MG/DL (ref 70–99)
GLUCOSE BLD-MCNC: 173 MG/DL (ref 70–99)
GLUCOSE BLD-MCNC: 176 MG/DL (ref 70–99)
GLUCOSE BLD-MCNC: 189 MG/DL (ref 70–99)
GLUCOSE BLD-MCNC: 201 MG/DL (ref 70–99)
GLUCOSE BLD-MCNC: 226 MG/DL (ref 70–99)
GLUCOSE SERPL-MCNC: 118 MG/DL (ref 70–99)
GLUCOSE SERPL-MCNC: 1346 MG/DL (ref 70–99)
HCT VFR BLD AUTO: 27.5 % (ref 36–48)
HGB BLD-MCNC: 8.8 G/DL (ref 12–16)
LYMPHOCYTES # BLD: 1.2 K/UL (ref 1–5.1)
LYMPHOCYTES NFR BLD: 12.5 %
MAGNESIUM SERPL-MCNC: 2.6 MG/DL (ref 1.8–2.4)
MCH RBC QN AUTO: 29.3 PG (ref 26–34)
MCHC RBC AUTO-ENTMCNC: 32.2 G/DL (ref 31–36)
MCV RBC AUTO: 91 FL (ref 80–100)
MONOCYTES # BLD: 0.7 K/UL (ref 0–1.3)
MONOCYTES NFR BLD: 7.3 %
NEUTROPHILS # BLD: 7.6 K/UL (ref 1.7–7.7)
NEUTROPHILS NFR BLD: 76.8 %
PERFORMED ON: ABNORMAL
PHOSPHATE SERPL-MCNC: 3.6 MG/DL (ref 2.5–4.9)
PHOSPHATE SERPL-MCNC: 7.3 MG/DL (ref 2.5–4.9)
PLATELET # BLD AUTO: 157 K/UL (ref 135–450)
PMV BLD AUTO: 9.9 FL (ref 5–10.5)
POTASSIUM SERPL-SCNC: 3.7 MMOL/L (ref 3.5–5.1)
POTASSIUM SERPL-SCNC: 3.9 MMOL/L (ref 3.5–5.1)
PROT SERPL-MCNC: 5.7 G/DL (ref 6.4–8.2)
RBC # BLD AUTO: 3.02 M/UL (ref 4–5.2)
SODIUM SERPL-SCNC: 130 MMOL/L (ref 136–145)
SODIUM SERPL-SCNC: 140 MMOL/L (ref 136–145)
WBC # BLD AUTO: 9.9 K/UL (ref 4–11)

## 2024-08-19 PROCEDURE — 80076 HEPATIC FUNCTION PANEL: CPT

## 2024-08-19 PROCEDURE — 6370000000 HC RX 637 (ALT 250 FOR IP): Performed by: INTERNAL MEDICINE

## 2024-08-19 PROCEDURE — 6360000002 HC RX W HCPCS

## 2024-08-19 PROCEDURE — 6370000000 HC RX 637 (ALT 250 FOR IP)

## 2024-08-19 PROCEDURE — 99232 SBSQ HOSP IP/OBS MODERATE 35: CPT | Performed by: INTERNAL MEDICINE

## 2024-08-19 PROCEDURE — 1200000000 HC SEMI PRIVATE

## 2024-08-19 PROCEDURE — 87205 SMEAR GRAM STAIN: CPT

## 2024-08-19 PROCEDURE — 87075 CULTR BACTERIA EXCEPT BLOOD: CPT

## 2024-08-19 PROCEDURE — 85025 COMPLETE CBC W/AUTO DIFF WBC: CPT

## 2024-08-19 PROCEDURE — 2500000003 HC RX 250 WO HCPCS: Performed by: INTERNAL MEDICINE

## 2024-08-19 PROCEDURE — 87077 CULTURE AEROBIC IDENTIFY: CPT

## 2024-08-19 PROCEDURE — 87070 CULTURE OTHR SPECIMN AEROBIC: CPT

## 2024-08-19 PROCEDURE — 83735 ASSAY OF MAGNESIUM: CPT

## 2024-08-19 PROCEDURE — 2580000003 HC RX 258

## 2024-08-19 PROCEDURE — 87186 SC STD MICRODIL/AGAR DIL: CPT

## 2024-08-19 PROCEDURE — 80069 RENAL FUNCTION PANEL: CPT

## 2024-08-19 PROCEDURE — 2500000003 HC RX 250 WO HCPCS

## 2024-08-19 PROCEDURE — 2580000003 HC RX 258: Performed by: INTERNAL MEDICINE

## 2024-08-19 RX ORDER — PRAZOSIN HYDROCHLORIDE 2 MG/1
4 CAPSULE ORAL 2 TIMES DAILY
Status: ON HOLD | COMMUNITY
End: 2024-08-22 | Stop reason: HOSPADM

## 2024-08-19 RX ORDER — NYSTATIN 100000/ML
5 SUSPENSION, ORAL (FINAL DOSE FORM) ORAL 4 TIMES DAILY
Status: DISCONTINUED | OUTPATIENT
Start: 2024-08-19 | End: 2024-08-22 | Stop reason: HOSPADM

## 2024-08-19 RX ADMIN — METOCLOPRAMIDE HYDROCHLORIDE 10 MG: 5 INJECTION INTRAMUSCULAR; INTRAVENOUS at 04:02

## 2024-08-19 RX ADMIN — TRAZODONE HYDROCHLORIDE 100 MG: 100 TABLET ORAL at 20:30

## 2024-08-19 RX ADMIN — METOCLOPRAMIDE HYDROCHLORIDE 10 MG: 5 INJECTION INTRAMUSCULAR; INTRAVENOUS at 20:31

## 2024-08-19 RX ADMIN — METOCLOPRAMIDE HYDROCHLORIDE 10 MG: 5 INJECTION INTRAMUSCULAR; INTRAVENOUS at 09:04

## 2024-08-19 RX ADMIN — ASCORBIC ACID, VITAMIN A PALMITATE, CHOLECALCIFEROL, THIAMINE HYDROCHLORIDE, RIBOFLAVIN-5 PHOSPHATE SODIUM, PYRIDOXINE HYDROCHLORIDE, NIACINAMIDE, DEXPANTHENOL, ALPHA-TOCOPHEROL ACETATE, VITAMIN K1, FOLIC ACID, BIOTIN, CYANOCOBALAMIN: 200; 3300; 200; 6; 3.6; 6; 40; 15; 10; 150; 600; 60; 5 INJECTION, SOLUTION INTRAVENOUS at 18:43

## 2024-08-19 RX ADMIN — THIAMINE HYDROCHLORIDE 100 MG: 100 INJECTION, SOLUTION INTRAMUSCULAR; INTRAVENOUS at 09:04

## 2024-08-19 RX ADMIN — ENOXAPARIN SODIUM 40 MG: 100 INJECTION SUBCUTANEOUS at 09:04

## 2024-08-19 RX ADMIN — Medication 500000 UNITS: at 12:18

## 2024-08-19 RX ADMIN — HYDROMORPHONE HYDROCHLORIDE 0.5 MG: 1 INJECTION, SOLUTION INTRAMUSCULAR; INTRAVENOUS; SUBCUTANEOUS at 22:52

## 2024-08-19 RX ADMIN — SODIUM BICARBONATE: 84 INJECTION, SOLUTION INTRAVENOUS at 04:00

## 2024-08-19 RX ADMIN — MICONAZOLE NITRATE: 20 POWDER TOPICAL at 20:31

## 2024-08-19 RX ADMIN — INSULIN LISPRO 2 UNITS: 100 INJECTION, SOLUTION INTRAVENOUS; SUBCUTANEOUS at 22:44

## 2024-08-19 RX ADMIN — Medication 500000 UNITS: at 20:30

## 2024-08-19 RX ADMIN — METRONIDAZOLE 500 MG: 500 INJECTION, SOLUTION INTRAVENOUS at 15:30

## 2024-08-19 RX ADMIN — VANCOMYCIN HYDROCHLORIDE 500 MG: 500 INJECTION, POWDER, LYOPHILIZED, FOR SOLUTION INTRAVENOUS at 11:39

## 2024-08-19 RX ADMIN — INSULIN LISPRO 2 UNITS: 100 INJECTION, SOLUTION INTRAVENOUS; SUBCUTANEOUS at 04:35

## 2024-08-19 RX ADMIN — VANCOMYCIN HYDROCHLORIDE 500 MG: 500 INJECTION, POWDER, LYOPHILIZED, FOR SOLUTION INTRAVENOUS at 18:41

## 2024-08-19 RX ADMIN — HYDROMORPHONE HYDROCHLORIDE 0.5 MG: 1 INJECTION, SOLUTION INTRAMUSCULAR; INTRAVENOUS; SUBCUTANEOUS at 11:57

## 2024-08-19 RX ADMIN — METRONIDAZOLE 500 MG: 500 INJECTION, SOLUTION INTRAVENOUS at 22:46

## 2024-08-19 RX ADMIN — MICONAZOLE NITRATE: 20 POWDER TOPICAL at 12:18

## 2024-08-19 RX ADMIN — SODIUM BICARBONATE: 84 INJECTION, SOLUTION INTRAVENOUS at 15:30

## 2024-08-19 RX ADMIN — METRONIDAZOLE 500 MG: 500 INJECTION, SOLUTION INTRAVENOUS at 06:48

## 2024-08-19 RX ADMIN — HYDROMORPHONE HYDROCHLORIDE 0.5 MG: 1 INJECTION, SOLUTION INTRAMUSCULAR; INTRAVENOUS; SUBCUTANEOUS at 02:14

## 2024-08-19 RX ADMIN — SODIUM CHLORIDE, PRESERVATIVE FREE 40 MG: 5 INJECTION INTRAVENOUS at 20:31

## 2024-08-19 RX ADMIN — SODIUM CHLORIDE, PRESERVATIVE FREE 40 MG: 5 INJECTION INTRAVENOUS at 09:04

## 2024-08-19 RX ADMIN — Medication 500000 UNITS: at 18:40

## 2024-08-19 RX ADMIN — VANCOMYCIN HYDROCHLORIDE 250 MG: 250 POWDER, FOR SOLUTION ORAL at 00:30

## 2024-08-19 RX ADMIN — CALCIUM POLYCARBOPHIL 625 MG: 625 TABLET ORAL at 09:05

## 2024-08-19 RX ADMIN — HYDROMORPHONE HYDROCHLORIDE 0.5 MG: 1 INJECTION, SOLUTION INTRAMUSCULAR; INTRAVENOUS; SUBCUTANEOUS at 15:28

## 2024-08-19 RX ADMIN — METOCLOPRAMIDE HYDROCHLORIDE 10 MG: 5 INJECTION INTRAMUSCULAR; INTRAVENOUS at 15:28

## 2024-08-19 ASSESSMENT — PAIN SCALES - GENERAL
PAINLEVEL_OUTOF10: 4
PAINLEVEL_OUTOF10: 3
PAINLEVEL_OUTOF10: 8
PAINLEVEL_OUTOF10: 9
PAINLEVEL_OUTOF10: 5
PAINLEVEL_OUTOF10: 9
PAINLEVEL_OUTOF10: 9
PAINLEVEL_OUTOF10: 3

## 2024-08-19 ASSESSMENT — PAIN DESCRIPTION - DESCRIPTORS
DESCRIPTORS: THROBBING;ACHING
DESCRIPTORS: ACHING

## 2024-08-19 ASSESSMENT — PAIN DESCRIPTION - PAIN TYPE
TYPE: SURGICAL PAIN
TYPE: SURGICAL PAIN

## 2024-08-19 ASSESSMENT — PAIN DESCRIPTION - ORIENTATION
ORIENTATION: MID
ORIENTATION: MID

## 2024-08-19 ASSESSMENT — PAIN DESCRIPTION - LOCATION
LOCATION: ABDOMEN
LOCATION: ABDOMEN

## 2024-08-19 ASSESSMENT — PAIN SCALES - WONG BAKER: WONGBAKER_NUMERICALRESPONSE: NO HURT

## 2024-08-19 ASSESSMENT — PAIN DESCRIPTION - ONSET
ONSET: ON-GOING
ONSET: ON-GOING

## 2024-08-19 ASSESSMENT — PAIN DESCRIPTION - FREQUENCY
FREQUENCY: INTERMITTENT
FREQUENCY: INTERMITTENT

## 2024-08-19 NOTE — PROGRESS NOTES
ID Follow-up NOTE    CC:   Recurrent C diff infection   Antibiotics: Enteral Vanco, IV Metronidazole    Admit Date: 8/2/2024  Hospital Day: 18    Subjective:     POD#7 RADHA, gastroduodenectomy with reanastomosis, J-tube    Patient c/o abd pain       Objective:     Patient Vitals for the past 8 hrs:   BP Temp Temp src Pulse Resp SpO2   08/19/24 0901 133/66 98.2 °F (36.8 °C) Oral 62 18 98 %   08/19/24 0406 (!) 120/53 97.9 °F (36.6 °C) Oral 62 18 97 %   08/19/24 0244 -- -- -- -- 17 --   08/19/24 0214 -- -- -- -- 21 --     I/O last 3 completed shifts:  In: 4234.1 [P.O.:40; I.V.:2273.6; NG/GT:90; IV Piggyback:279.4]  Out: 4605 [Urine:2400; Emesis/NG output:505; Stool:1700]  I/O this shift:  In: -   Out: 200 [Urine:200]    EXAM:  GENERAL: No apparent distress.  RA  HEENT: Membranes moist, no oral lesion  NECK:  Supple, no lymphadenopathy  LUNGS: Clear b/l, no rales, no dullness  CARDIAC: RRR, no murmur appreciated  ABD:  Hypoactive BS, soft , diffuse tenderness, J-tube, midline wound w staples  EXT:  No rash, no edema, no lesions  NEURO: No focal neurologic findings  PSYCH: Orientation, sensorium, mood normal  LINES:  R PICC, placed 8/4       Data Review:  Lab Results   Component Value Date    WBC 9.9 08/18/2024    HGB 9.1 (L) 08/18/2024    HCT 28.2 (L) 08/18/2024    MCV 93.3 08/18/2024     (L) 08/18/2024     Lab Results   Component Value Date    CREATININE 1.8 (H) 08/19/2024    BUN 50 (H) 08/19/2024     (L) 08/19/2024    K 3.7 08/19/2024     08/19/2024    CO2 19 (L) 08/19/2024       Hepatic Function Panel:   Lab Results   Component Value Date/Time    ALKPHOS 211 08/19/2024 04:53 AM    ALT <5 08/19/2024 04:53 AM    AST 9 08/19/2024 04:53 AM    BILITOT <0.2 08/19/2024 04:53 AM    BILIDIR 0.2 08/19/2024 04:53 AM    IBILI see below 08/19/2024 04:53 AM       Micro:   - Stool C difficile toxin B gene detected (8/1/24)  - Urine culture positive for E coli (7/31/24)  Antibiotic Interpretation Microscan

## 2024-08-19 NOTE — PROGRESS NOTES
Palliative Care Chart Review  and Check in Note:     NAME:  Agustina Fried  Admit Date: 8/2/2024  Hospital Day:  Hospital Day: 18   Current Code status: Full Code    Palliative care is continuing to following Ms. Fried for symptom management,  and goals of care discussion as needed. Patient's chart reviewed today 8/19/24.      Saw pt at the bedside. She was alert, not oriented. Called her son again. He was hoping pt could go to the [facility]. I explained I am not sure that they would be able to take her on TPN but I d/w the . Pt's son Maurice asked why surgery couldn't take out her whole mass during her surgery. I will defer that question to them as I am not with the surgery team.     D/w Dr. Arya CM RN Elaina      The following are the currently established goals/code status, and Symptom management.     Goals of care: Per pt/family goals are rehabilitative and life prolonging. S/p family meeting this admission. Both myself and oncology have recommended hospice care. Pt's family not interested. Pt's son Maurice is her HCPOA. Have informed him that a family member needs to be present at her appointments in the future due to pt's cognitive impairment.      Code status: Full Code    Discharge plan: D, [facility] vs [facility] when medically ready for discharge      SARTHAK Sandoval - CNP  08/19/24  2:40 PM

## 2024-08-19 NOTE — PROGRESS NOTES
Ph: [phone], Fax: [phone]           [facility]               Reason for admission:                 Pain abdomen nausea and vomiting    Brief Summary :     Agustina Fried is being seen by nephrology for CKD       Interval History and plan:     Seen in room  BP is better  Urine is 1600 ml + diarrhea    Creatinine is 1.2 > 1.4> 1.7 > 2.0 > 1.8  SP extensive surgery  (Diagnostic laparoscopy, ex-lap, jose-en-y gastrojejunostomy bypass, feeding tube placement, and extensive lysis of adhesions )    Continue clonidine patch to 0.3 mg q weekly and amlodipine   Placed a Dooley catheter   Continue IVF with bicarbonate   Potassium improved   Blood glucose is a lab error . Repeat renal                      Assessment :       CKD Stage IIIa  Creatinine has been variable in the past she has a history of recurrent SHARRON  Last creatinine was 2 upon discharge from 7/2 hospitalization  she has history of diabetes mellitus hypertension    CHF  Echo (7/2/24)    Limited only for LVEF and RVF.    Image quality is adequate.    Left Ventricle: Normal left ventricular systolic function with a visually estimated EF of 55 - 60%. Normal wall motion.    Right ventricle size is normal. Normal systolic function.    Does not appear volume overloaded      Hypertension   BP: (120-158)/(53-59)  Pulse:  [58-62]   BP goal inpatient 130-140 systolic inpatient        [facility] would like to thank Zahida Koenig MD   for opportunity to serve this patient      Please call with questions at-   24 Hrs Answering service [phone] or  7 am- 5 pm via Perfect serve or cell phone  Dr.Muhammad Dayne Trimble MD       HPI :     Agustina Fried is a 64 y.o. female presented to   the hospital on 8/2/2024 with  pain abdomen nausea and vomiting.  She is known to have nausea vomiting diarrhea for several days because of which she came to the hospital.  She needed NG tube placement in the emergency room.  Found to have severe  gastric distention.  She is known to have CKD and has had multiple hospitalization in the past        PMH/PSH/SH/Family History:     Past Medical History:   Diagnosis Date    Abnormal brain MRI 7/20/2017    Partially empty sella and minimal chronic small vessel ischemic disease    Acute bilateral low back pain without sciatica 11/2/2016    SHARRON (acute kidney injury) ([facility]) 7/5/2017    Arthritis     back    Bipolar disorder ([facility]) 10/18/2008    CAD (coronary artery disease)     stent placed 6/8/20    Cancer ([facility]) 2015    bilateral breast:s/p lumpectomy/radiation:under care care of breast specialist:Dr. Boone     Carotid stenosis, bilateral:<50%:per US 7/2016 7/15/2016    Carpal tunnel syndrome 10/18/2008    Cervical cancer screening 2014    Nml per pt'.    Coronary artery disease of native artery of native heart with stable angina pectoris ([facility]) 6/9/2020    DDD (degenerative disc disease), lumbar 7/18/2018    Depression     under care of pschiatrist:Dr. 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 myelopathy or radiculopathy 3/10/2017    Transient cerebral ischemia 07/15/2016    TIA:7/10/16    Unspecified cerebral artery occlusion with cerebral infarction     TIA          Medication:     Current Facility-Administered Medications: vancomycin 500 mg in sodium chloride 0.9% 100 mL enema, 500 mg,

## 2024-08-19 NOTE — PROGRESS NOTES
General Surgery  Progress Note      Procedure(s) Performed: Diagnostic laparoscopy, ex-lap, jose-en-y gastrojejunostomy bypass, feeding tube placement, and extensive lysis of adhesions.     Subjective:   NAEON. Patient remains at baseline mental status. Midline incision erythematous with purulent discharge, some staples were therefore removed this AM. Endorsing stable abdominal pain. Tolerating TF    Objective:    Vitals:   Vitals:    08/19/24 0107 08/19/24 0214 08/19/24 0244 08/19/24 0406   BP: (!) 158/59   (!) 120/53   Pulse: 58   62   Resp: 16 21 17 18   Temp: 97.9 °F (36.6 °C)   97.9 °F (36.6 °C)   TempSrc: Oral   Oral   SpO2: 100%   97%   Weight:       Height:           Physical Exam:  General appearance: alert, no acute distress  Chest/Lungs: Normal effort with no accessory muscle use  Cardiovascular: RRR,  well perfused  Abdomen: Soft, non-distended, appropriately tender. Midline incision erythematous. Purulent discharge observed after staples were removed from midline incision. G-tube venting to drainage bag, J-tube venting to drainage to bag      Assessment and Plan  This is a 64 y.o. year old female status post diagnostic laparoscopy, ex-lap, jose-en-y gastrojejunostomy bypass, feeding tube placement, and extensive lysis of adhesions secondary to duodenal obstruction. (8/11/24).    - G and J tube to gravity with petersen bag. Okay to place medications through G port and clamp for 30 minutes if needed  - MMPC  - Hyperglycemia management per Medicine  - IS ordered to bedside, encourage hourly IS and deep breathing  - Okay for NPO sips of clears/chips, Tube feeds  - F/u with dietary for TPN recommendations   - Petersen in place, nephrology recs appreciated regarding increasing cret.    F/u urine creatinine   - PT/OT, OOB, ambulate in halls  - Will continue to monitor midline incision, f/u cultures taken. Midline packed with 1/4 inch iodoform to be changed this afternoon, covered with 4 x 8s gauze and

## 2024-08-19 NOTE — PROGRESS NOTES
Patient alert to self. Prn pain medication administered. TPN infusing. Output documented,  see flowsheet. Call light within reach. Pt denies needs at this time.    BP (!) 120/53   Pulse 62   Temp 97.9 °F (36.6 °C) (Oral)   Resp 18   Ht 1.6 m (5' 2.99\")   Wt 76.7 kg (169 lb)   SpO2 97%   BMI 29.94 kg/m²

## 2024-08-19 NOTE — CONSULTS
Nutrition Assessment     RECOMMENDATION:    PO diet: NPO with sips of clears and popsicles per MD  Nutrition Support:  A. Enteral nutrition:  Enteral Formula: Glucerna 1.5 (Diabetic )   Resume enteral feeds at trophic rate of 10 ml/hr, and as tolerated advance by 10 ml Q8 hrs towards goal rate of 45 ml/hr  Water Flushes: 30ml q4 (Maintenance)  B. TPN:   Recommend continue current TPN Clinimix 5/20 at 70 ml/hr continuous. If Pt tolerates EN advancement over the next 24 hrs, RD will provide recommendations to begin TPN wean.   TPN should run continuous rather than cyclic when weaning- current goal is to decrease rate and volume rather than run time  Obtain Labs: Daily Phos, Mg, K+  Pharmacy to adjust electrolytes, MVI and Trace        Elements as appropriate.  Hold lipids at this time, will consider starting if unable to resume EN    NUTRITION ASSESSMENT:   Nutritional summary & status: Consult for TPN recs, follow up. Patient had been tolerating enteral feeds almost to goal 8/17, RD recommended continue EN towards goal and cut TPN rate by half to run at 35 ml/hr continuous. Pt thenhad 500 ml emesis and G/J turned to gravity. Noted TPN ordered to run at cyclic rate overnight 8/17 resulting in elevated BG, order converted back to continuous. Currently G/J remains to gravity with minimal output ~24 hrs and pt tolerating small sips of clears per RN report. TPN has been resumed to full rate of 70 ml/hr continuous. RD recommends resuming trophic feeds at 10 ml/hr via J-tube and continue TPN at 70 ml/hr today. Per CM note TPN is a barrier to admission to SNF, RD will continue to monitor EN tolerance and ability to wean TPN.     Admission/PMH: Duodenal mass // hypertension, diabetes, GERD, hyperlipidemia and CKD stage IIIb    MALNUTRITION ASSESSMENT  Chronic Illness  Malnutrition Status: At risk for malnutrition (significant wt loss)  Findings of the 6 clinical characteristics of malnutrition:  Energy Intake:  Mild

## 2024-08-19 NOTE — CONSULTS
Nutrition Assessment     RECOMMENDATION:    PO diet: NPO with sips of clears and popsicles per MD  Nutrition Support:  A. Enteral nutrition:  Enteral Formula: Glucerna 1.5 (Diabetic )   Resume enteral feeds at trophic rate of 10 ml/hr, and as tolerated advance by 10 ml Q8 hrs towards goal rate of 45 ml/hr  Water Flushes: 30ml q4 (Maintenance)  B. TPN:   Recommend continue current TPN at 70 ml/hr continuous. If Pt tolerated EN advancement over the next 24 hrs, RD will provide recommendations to begin TPN wean.   TPN should run continuous rather than cyclic when weaning- current goal is to decrease rate and volume rather than run time    NUTRITION ASSESSMENT:   Nutritional summary & status: Consult for TPN recs, follow up. Patient had been tolerating enteral feeds almost to goal 8/17, RD recommended continue EN towards goal and cut TPN rate by half to run at 35 ml/hr continuous. Pt thenhad 500 ml emesis and G/J turned to gravity. Noted TPN ordered to run at cyclic rate overnight 8/17 resulting in elevated BG, order converted back to continuous. Currently G/J remains to gravity with minimal output ~24 hrs and pt tolerating small sips of clears per RN report. TPN has been resumed to full rate of 70 ml/hr continuous. RD recommends resuming trophic feeds at 10 ml/hr via J-tube and continue TPN at 70 ml/hr today. Per CM note TPN is a barrier to admission to SNF, RD will continue to monitor EN tolerance and ability to wean TPN.     Admission/PMH: Duodenal mass // hypertension, diabetes, GERD, hyperlipidemia and CKD stage IIIb    MALNUTRITION ASSESSMENT  Chronic Illness  Malnutrition Status: At risk for malnutrition (significant wt loss)  Findings of the 6 clinical characteristics of malnutrition:  Energy Intake:  Mild decrease in energy intake (prior to admit, timeframe unknown)  Weight Loss:  Greater than 5% over 1 month (9.5% in 1 month)     Body Fat Loss:  Unable to assess     Muscle Mass Loss:  Unable to assess

## 2024-08-19 NOTE — PROGRESS NOTES
Pharmacy Consult Note  - Admission Medication Reconciliation      Pharmacy consulted for reconciliation of pyxfv-[id number] medications.        Sources include:   Review of facility papers-- med list dated 8/16/24 from Arielle Garduno (list place in clinical pharmacy office on [address] over weekend, found Monday am)     The following changes made to [id number]-xyiklrssk medication list:    ADDED:  none    Dose or Frequency CHANGE:  1) Deutetrabenazine ER - takes 9 mg daily, not 30 mg  2) Prazosin - takes 4 mg BID for HTN, not 2 mg nightly    REMOVED:   (not on most recent [facility] med list)  1) Zyrtec  2) Plavix  3) Clonazepam   4) Pristiq (desvenlafaxine)  5) Percocet  6) Actos (pioglitazone)     OTHER CONCERNS:  Pt admitted here on 8/2/24.  [facility] med list is dated 8/16/24.  Assume it was faxed here on that date and reflects most recent medications at time of admission.       Current Outpatient Medications   Medication Instructions    acetaminophen (TYLENOL) 650 mg, Oral, EVERY 6 HOURS PRN    amLODIPine (NORVASC) 5 mg, Oral, DAILY    atorvastatin (LIPITOR) 40 mg, Oral, NIGHTLY    calcium carbonate-vitamin D (CALTRATE) 600-400 MG-UNIT TABS per tab 1 tablet, Oral, 2 TIMES DAILY    Deutetrabenazine (AUSTEDO) 9 mg, Oral, DAILY    gabapentin (NEURONTIN) 200 mg, Oral, 2 times daily    glycerin 2 g suppository 1 suppository, Rectal, PRN    insulin glargine (LANTUS) 8 Units, SubCUTAneous, DAILY    insulin lispro (1 Unit Dial) (HUMALOG KWIKPEN) 0-4 Units, SubCUTAneous, 3 TIMES DAILY BEFORE MEALS    loperamide (IMODIUM) 2 mg, Oral, 4 TIMES DAILY PRN    magnesium hydroxide (MILK OF MAGNESIA) 400 MG/5ML suspension 30 mLs, Oral, DAILY PRN    paliperidone (INVEGA) 9 mg, Oral, EVERY MORNING    pantoprazole (PROTONIX) 40 mg, Oral, 2 TIMES DAILY    prazosin (MINIPRESS) 4 mg, Oral, 2 times daily    sodium phosphate (FLEET) 7-19 GM/118ML 1 enema, Rectal, PRN    torsemide (DEMADEX) 10 mg, Oral, DAILY    traZODone

## 2024-08-19 NOTE — CARE COORDINATION
Patient admitted from [facility] and wanted to return there at d/c but family had asked about [facility]. I sent the referral on Friday but at that time we hoped she would be off TPN and only of tube feeds which will not be the case. They are not able to take the patient on TPN. I sent a referral to [facility] and family prefers the [facility] location but we will need precert for placement. Per surgery patient is not quite ready for d/c yet.     Electronically signed by Elaina Rutherford RN on 8/19/2024 at 3:42 PM  [phone]

## 2024-08-19 NOTE — PROGRESS NOTES
[facility] -  Clinical Pharmacy Note    Parenteral Nutrition - Management by Pharmacy    Consult Date(s): 8/4/24 and 8/17/24 (DC/ reinitiate same day)  Consulting Provider(s): Dr Butler/ Dr Fairbanks    Assessment / Plan  1)  Gastric outlet obstruction 2/2 ulcerated mass, ileus - Parenteral Nutrition  Day of Therapy:16  Formulation:  Clinimix -E 5/20    As electrolytes have normalized, will change back to standard electrolyte formulation  Clinimix Rate:  70 ml/hr- 1680 ml/day   RD recommendation today:  Recommend continue current TPN at 70 ml/hr continuous. If Pt tolerates EN advancement over the next 24 hrs, RD will provide recommendations to begin TPN wean.   TPN should run continuous rather than cyclic when weaning- current goal is to decrease rate and volume rather than run time  Lipids:  20% 250mL over 12 hours on Mon & Thurs only (due to national shortage)  Dietician today recommending stopping lipids for now with plan to wean off TPN soon- will watch for future recommendations  Electrolytes:   Clinimix-E includes standard electrolyte formulation. Recommend further repletion with supplemental IVPBs as needed.     Maintenance IVF:  Sodium bicarb 100 mL/hr (15 mEq/hr)  Glucose control:    Pt has h/o DM.  Takes Lantus 8 units daily + Humalog 4 units TID with meals at home.  Glucoses improved since last TPN hung and changed from cyclic to continuous  Glucose range 118-173 since last bag hung  Will continue Regular insulin to 40 units to be given / day (48 units added to bag) in TPN bag.  Coverage with medium dose SSI q4h on board. Used 2 units of Humalog for outlier glucose of 1346 overnight which was determined to have been drawn from TPN line.   Will monitor glucoses and adjust insulin in TPN bag as needed.     Add MVI 10 mL/day in PN on Mon-Wed-Fri only (due to national shortage) & Trace Elements 1 mL/day in PN daily.  Daily renal panel, daily magnesium, and weekly triglycerides ordered per protocol.  PN will  122*     Triglycerides   Date Value Ref Range Status   08/18/2024 34 0 - 150 mg/dL Final   08/15/2024 36 0 - 150 mg/dL Final   08/11/2024 20 0 - 150 mg/dL Final   08/06/2024 88 0 - 150 mg/dL Final   08/05/2024 91 0 - 150 mg/dL Final

## 2024-08-19 NOTE — PROGRESS NOTES
V2.0    [facility] [place]      Name:  Agustina Fried /Age/Sex: 1960  (64 y.o. female)   MRN & CSN:  [id number] & [id number] Encounter Date/Time: 2024 9:46 AM EDT   Location:  5319/5319-01 PCP: Viridiana Blanco APRN - NP     Attending:Zahida Koenig MD       Hospital Day: 18    Assessment and Recommendations   Agustina Fried is a 64 y.o. female with pmh of CVA, depression, anxiety, diabetes, hypertension, heart failure, CHF, renal, breast cancer, colon cancer who presents with nausea vomiting and rectal bleeding.  CT showed severe gastric distention.  She underwent a EGD which showed duodenal mass resulting in obstruction.  Patient was transferred to [facility] for surgery oncology input.  Patient underwent diagnostic laparoscopy with exploratory laparotomy gastrojejunal bypass with feeding tube placement and lysis of additions on 2024.  Patient has a G-tube to gravity and had a j-tube for tube feeding.  J-tube placement is dislodged and cannot be used for tube feeding but can be used for medication per surgery.  Patient is on TPN at present.  Discussed with surgery patient can take some clears but diet cannot be advanced.  Patient also has C. difficile colitis with worsening diarrhea was treated with IV Flagyl and vancomycin enema.  When J-tube was present it was switched to enteral vancomycin but now back to vancomycin enema  Prognosis is poor with worsening SHARRON.  Patient is also not a candidate for chemotherapy.  Palliative care is consulted.  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  Unclear plan for long-term nutrition  -s/p diagnostic laparoscopy with exploratory laparotomy gastrojejunal bypass with feeding tube placement and lysis of additions on 2024   -CT showed a 6 cm area of necrotic mass along the duodenum which  by provider] atorvastatin  40 mg Oral Nightly    [Held by provider] gabapentin  200 mg Oral BID    traZODone  100 mg Oral Nightly    metroNIDAZOLE  500 mg IntraVENous Q8H    cloNIDine  1 patch TransDERmal Weekly    pantoprazole (PROTONIX) 40 mg in sodium chloride (PF) 0.9 % 10 mL injection  40 mg IntraVENous Q12H    sodium chloride flush  5-40 mL IntraVENous 2 times per day    enoxaparin  40 mg SubCUTAneous Daily    thiamine  100 mg IntraVENous Daily    sodium chloride flush  5-40 mL IntraVENous 2 times per day      Infusions:    PN-Adult Premix 5/20 - Central 70 mL/hr at 08/18/24 1758    sodium bicarbonate 150 mEq in sterile water 1,000 mL infusion 100 mL/hr at 08/19/24 0400    sodium chloride      sodium chloride      sodium chloride 10 mL/hr at 08/17/24 0634    sodium chloride 5 mL/hr at 08/04/24 1851    dextrose       PRN Meds: sodium chloride, , PRN  sodium chloride, , PRN  phenol, 1 spray, Q2H PRN  sodium chloride flush, 5-40 mL, PRN  sodium chloride, , PRN  HYDROmorphone, 0.25 mg, Q3H PRN   Or  HYDROmorphone, 0.5 mg, Q3H PRN  sodium chloride flush, 5-40 mL, PRN  sodium chloride, , PRN  ondansetron, 4 mg, Q8H PRN   Or  ondansetron, 4 mg, Q6H PRN  polyethylene glycol, 17 g, Daily PRN  acetaminophen, 650 mg, Q6H PRN   Or  acetaminophen, 650 mg, Q6H PRN  glucose, 4 tablet, PRN  dextrose bolus, 125 mL, PRN   Or  dextrose bolus, 250 mL, PRN  glucagon (rDNA), 1 mg, PRN  dextrose, , Continuous PRN        Labs and Imaging   XR ABDOMEN (KUB) (SINGLE AP VIEW)    Result Date: 8/11/2024  EXAM: AP ABDOMEN INDICATION: NGT placement COMPARISON: 8/4/2024 FINDINGS: Nasogastric tube tip is in the left upper quadrant, projection of the body of the stomach.     Nasogastric tube tip in the stomach. Electronically signed by Lito Ellison MD    EGD    Result Date: 8/5/2024  No dictation       CBC:   Recent Labs     08/17/24  0500 08/18/24  0442   WBC 10.1 9.9   HGB 10.0* 9.1*   * 122*     BMP:    Recent Labs     08/18/24  0442

## 2024-08-19 NOTE — PLAN OF CARE
Problem: Safety - Adult  Goal: Free from fall injury  Outcome: Progressing     Problem: Nutrition Deficit:  Goal: Optimize nutritional status  Outcome: Progressing     Problem: Skin/Tissue Integrity  Goal: Absence of new skin breakdown  Description: 1.  Monitor for areas of redness and/or skin breakdown  2.  Assess vascular access sites hourly  3.  Every 4-6 hours minimum:  Change oxygen saturation probe site  4.  Every 4-6 hours:  If on nasal continuous positive airway pressure, respiratory therapy assess nares and determine need for appliance change or resting period.  Outcome: Progressing     Problem: Pain  Goal: Verbalizes/displays adequate comfort level or baseline comfort level  Outcome: Progressing

## 2024-08-20 LAB
ALBUMIN SERPL-MCNC: 2 G/DL (ref 3.4–5)
ANION GAP SERPL CALCULATED.3IONS-SCNC: 4 MMOL/L (ref 3–16)
BASOPHILS # BLD: 0.1 K/UL (ref 0–0.2)
BASOPHILS NFR BLD: 1 %
BUN SERPL-MCNC: 44 MG/DL (ref 7–20)
CALCIUM SERPL-MCNC: 7.4 MG/DL (ref 8.3–10.6)
CHLORIDE SERPL-SCNC: 108 MMOL/L (ref 99–110)
CO2 SERPL-SCNC: 29 MMOL/L (ref 21–32)
CREAT SERPL-MCNC: 1.5 MG/DL (ref 0.6–1.2)
DEPRECATED RDW RBC AUTO: 15 % (ref 12.4–15.4)
EOSINOPHIL # BLD: 0.1 K/UL (ref 0–0.6)
EOSINOPHIL NFR BLD: 1.6 %
GFR SERPLBLD CREATININE-BSD FMLA CKD-EPI: 38 ML/MIN/{1.73_M2}
GLUCOSE BLD-MCNC: 133 MG/DL (ref 70–99)
GLUCOSE BLD-MCNC: 150 MG/DL (ref 70–99)
GLUCOSE BLD-MCNC: 156 MG/DL (ref 70–99)
GLUCOSE BLD-MCNC: 160 MG/DL (ref 70–99)
GLUCOSE BLD-MCNC: 166 MG/DL (ref 70–99)
GLUCOSE BLD-MCNC: 200 MG/DL (ref 70–99)
GLUCOSE SERPL-MCNC: 160 MG/DL (ref 70–99)
HCT VFR BLD AUTO: 25.5 % (ref 36–48)
HGB BLD-MCNC: 8.4 G/DL (ref 12–16)
LYMPHOCYTES # BLD: 1.3 K/UL (ref 1–5.1)
LYMPHOCYTES NFR BLD: 19.1 %
MAGNESIUM SERPL-MCNC: 2 MG/DL (ref 1.8–2.4)
MCH RBC QN AUTO: 29.8 PG (ref 26–34)
MCHC RBC AUTO-ENTMCNC: 33 G/DL (ref 31–36)
MCV RBC AUTO: 90.3 FL (ref 80–100)
MONOCYTES # BLD: 0.5 K/UL (ref 0–1.3)
MONOCYTES NFR BLD: 7 %
NEUTROPHILS # BLD: 4.7 K/UL (ref 1.7–7.7)
NEUTROPHILS NFR BLD: 71.3 %
PERFORMED ON: ABNORMAL
PHOSPHATE SERPL-MCNC: 3.5 MG/DL (ref 2.5–4.9)
PLATELET # BLD AUTO: 148 K/UL (ref 135–450)
PMV BLD AUTO: 10.5 FL (ref 5–10.5)
POTASSIUM SERPL-SCNC: 3.9 MMOL/L (ref 3.5–5.1)
RBC # BLD AUTO: 2.82 M/UL (ref 4–5.2)
SODIUM SERPL-SCNC: 141 MMOL/L (ref 136–145)
WBC # BLD AUTO: 6.6 K/UL (ref 4–11)

## 2024-08-20 PROCEDURE — 6360000002 HC RX W HCPCS

## 2024-08-20 PROCEDURE — 6370000000 HC RX 637 (ALT 250 FOR IP)

## 2024-08-20 PROCEDURE — 2580000003 HC RX 258

## 2024-08-20 PROCEDURE — 99232 SBSQ HOSP IP/OBS MODERATE 35: CPT | Performed by: INTERNAL MEDICINE

## 2024-08-20 PROCEDURE — 85025 COMPLETE CBC W/AUTO DIFF WBC: CPT

## 2024-08-20 PROCEDURE — 83735 ASSAY OF MAGNESIUM: CPT

## 2024-08-20 PROCEDURE — 2500000003 HC RX 250 WO HCPCS

## 2024-08-20 PROCEDURE — 6370000000 HC RX 637 (ALT 250 FOR IP): Performed by: INTERNAL MEDICINE

## 2024-08-20 PROCEDURE — 80069 RENAL FUNCTION PANEL: CPT

## 2024-08-20 PROCEDURE — 2580000003 HC RX 258: Performed by: INTERNAL MEDICINE

## 2024-08-20 PROCEDURE — 97535 SELF CARE MNGMENT TRAINING: CPT

## 2024-08-20 PROCEDURE — 1200000000 HC SEMI PRIVATE

## 2024-08-20 PROCEDURE — 2500000003 HC RX 250 WO HCPCS: Performed by: INTERNAL MEDICINE

## 2024-08-20 PROCEDURE — 97530 THERAPEUTIC ACTIVITIES: CPT

## 2024-08-20 RX ORDER — METOCLOPRAMIDE HYDROCHLORIDE 5 MG/ML
5 INJECTION INTRAMUSCULAR; INTRAVENOUS EVERY 6 HOURS
Status: DISCONTINUED | OUTPATIENT
Start: 2024-08-20 | End: 2024-08-22 | Stop reason: HOSPADM

## 2024-08-20 RX ADMIN — TRAZODONE HYDROCHLORIDE 100 MG: 100 TABLET ORAL at 21:07

## 2024-08-20 RX ADMIN — TRACE ELEMENTS INJECTION 4: 7.4; .75; 98; 151 INJECTION, SOLUTION INTRAVENOUS at 17:47

## 2024-08-20 RX ADMIN — VANCOMYCIN HYDROCHLORIDE 500 MG: 500 INJECTION, POWDER, LYOPHILIZED, FOR SOLUTION INTRAVENOUS at 06:22

## 2024-08-20 RX ADMIN — SODIUM BICARBONATE: 84 INJECTION, SOLUTION INTRAVENOUS at 04:16

## 2024-08-20 RX ADMIN — VANCOMYCIN HYDROCHLORIDE 500 MG: 500 INJECTION, POWDER, LYOPHILIZED, FOR SOLUTION INTRAVENOUS at 00:54

## 2024-08-20 RX ADMIN — HYDROMORPHONE HYDROCHLORIDE 0.5 MG: 1 INJECTION, SOLUTION INTRAMUSCULAR; INTRAVENOUS; SUBCUTANEOUS at 21:01

## 2024-08-20 RX ADMIN — SODIUM CHLORIDE, PRESERVATIVE FREE 40 MG: 5 INJECTION INTRAVENOUS at 12:54

## 2024-08-20 RX ADMIN — HYDROMORPHONE HYDROCHLORIDE 0.5 MG: 1 INJECTION, SOLUTION INTRAMUSCULAR; INTRAVENOUS; SUBCUTANEOUS at 03:38

## 2024-08-20 RX ADMIN — Medication 500000 UNITS: at 17:49

## 2024-08-20 RX ADMIN — SODIUM CHLORIDE, PRESERVATIVE FREE 10 ML: 5 INJECTION INTRAVENOUS at 21:22

## 2024-08-20 RX ADMIN — METRONIDAZOLE 500 MG: 500 INJECTION, SOLUTION INTRAVENOUS at 23:59

## 2024-08-20 RX ADMIN — METOCLOPRAMIDE HYDROCHLORIDE 10 MG: 5 INJECTION INTRAMUSCULAR; INTRAVENOUS at 03:38

## 2024-08-20 RX ADMIN — ENOXAPARIN SODIUM 40 MG: 100 INJECTION SUBCUTANEOUS at 08:36

## 2024-08-20 RX ADMIN — METRONIDAZOLE 500 MG: 500 INJECTION, SOLUTION INTRAVENOUS at 14:32

## 2024-08-20 RX ADMIN — HYDROMORPHONE HYDROCHLORIDE 0.5 MG: 1 INJECTION, SOLUTION INTRAMUSCULAR; INTRAVENOUS; SUBCUTANEOUS at 14:28

## 2024-08-20 RX ADMIN — Medication 500000 UNITS: at 12:54

## 2024-08-20 RX ADMIN — METOCLOPRAMIDE HYDROCHLORIDE 5 MG: 5 INJECTION INTRAMUSCULAR; INTRAVENOUS at 14:29

## 2024-08-20 RX ADMIN — HYDROMORPHONE HYDROCHLORIDE 0.5 MG: 1 INJECTION, SOLUTION INTRAMUSCULAR; INTRAVENOUS; SUBCUTANEOUS at 06:09

## 2024-08-20 RX ADMIN — MICONAZOLE NITRATE: 20 POWDER TOPICAL at 21:06

## 2024-08-20 RX ADMIN — INSULIN LISPRO 2 UNITS: 100 INJECTION, SOLUTION INTRAVENOUS; SUBCUTANEOUS at 12:54

## 2024-08-20 RX ADMIN — Medication 500000 UNITS: at 08:36

## 2024-08-20 RX ADMIN — VANCOMYCIN HYDROCHLORIDE 250 MG: 250 POWDER, FOR SOLUTION ORAL at 19:14

## 2024-08-20 RX ADMIN — VANCOMYCIN HYDROCHLORIDE 250 MG: 250 POWDER, FOR SOLUTION ORAL at 14:29

## 2024-08-20 RX ADMIN — METOCLOPRAMIDE HYDROCHLORIDE 5 MG: 5 INJECTION INTRAMUSCULAR; INTRAVENOUS at 21:03

## 2024-08-20 RX ADMIN — SODIUM CHLORIDE, PRESERVATIVE FREE 40 MG: 5 INJECTION INTRAVENOUS at 21:18

## 2024-08-20 RX ADMIN — SODIUM CHLORIDE: 900 INJECTION, SOLUTION INTRAVENOUS at 19:13

## 2024-08-20 RX ADMIN — MICONAZOLE NITRATE: 20 POWDER TOPICAL at 08:54

## 2024-08-20 RX ADMIN — THIAMINE HYDROCHLORIDE 100 MG: 100 INJECTION, SOLUTION INTRAMUSCULAR; INTRAVENOUS at 08:35

## 2024-08-20 RX ADMIN — CALCIUM POLYCARBOPHIL 625 MG: 625 TABLET ORAL at 08:36

## 2024-08-20 RX ADMIN — METRONIDAZOLE 500 MG: 500 INJECTION, SOLUTION INTRAVENOUS at 06:24

## 2024-08-20 RX ADMIN — METOCLOPRAMIDE HYDROCHLORIDE 10 MG: 5 INJECTION INTRAMUSCULAR; INTRAVENOUS at 08:36

## 2024-08-20 RX ADMIN — SODIUM CHLORIDE: 900 INJECTION, SOLUTION INTRAVENOUS at 08:48

## 2024-08-20 ASSESSMENT — PAIN DESCRIPTION - DESCRIPTORS
DESCRIPTORS: CRAMPING
DESCRIPTORS: THROBBING;ACHING
DESCRIPTORS: ACHING;DISCOMFORT

## 2024-08-20 ASSESSMENT — PAIN DESCRIPTION - LOCATION
LOCATION: ABDOMEN

## 2024-08-20 ASSESSMENT — PAIN DESCRIPTION - FREQUENCY: FREQUENCY: INTERMITTENT

## 2024-08-20 ASSESSMENT — PAIN SCALES - GENERAL
PAINLEVEL_OUTOF10: 8
PAINLEVEL_OUTOF10: 9
PAINLEVEL_OUTOF10: 8
PAINLEVEL_OUTOF10: 8
PAINLEVEL_OUTOF10: 3
PAINLEVEL_OUTOF10: 3
PAINLEVEL_OUTOF10: 5
PAINLEVEL_OUTOF10: 4

## 2024-08-20 ASSESSMENT — PAIN DESCRIPTION - ONSET: ONSET: ON-GOING

## 2024-08-20 ASSESSMENT — PAIN DESCRIPTION - PAIN TYPE: TYPE: SURGICAL PAIN

## 2024-08-20 ASSESSMENT — PAIN DESCRIPTION - ORIENTATION
ORIENTATION: MID

## 2024-08-20 NOTE — PROGRESS NOTES
Medications: vancomycin 500 mg in sodium chloride 0.9% 100 mL enema, 500 mg, Rectal, Q6H  miconazole (MICOTIN) 2 % powder, , Topical, BID  nystatin (MYCOSTATIN) 255801 UNIT/ML suspension 500,000 Units, 5 mL, Oral, 4x Daily  PN-Adult Premix 5/20 - Standard Electrolytes - Central Line, , IntraVENous, Continuous TPN  insulin lispro (HUMALOG,ADMELOG) injection vial 0-8 Units, 0-8 Units, SubCUTAneous, Q4H  polycarbophil (FIBERCON) tablet 625 mg, 625 mg, Oral, Daily  sodium bicarbonate 150 mEq in sterile water 1,000 mL infusion, , IntraVENous, Continuous  metoclopramide (REGLAN) injection 10 mg, 10 mg, IntraVENous, Q6H  [Held by provider] amLODIPine (NORVASC) tablet 5 mg, 5 mg, Oral, Daily  [Held by provider] atorvastatin (LIPITOR) tablet 40 mg, 40 mg, Oral, Nightly  [Held by provider] gabapentin (NEURONTIN) capsule 200 mg, 200 mg, Oral, BID  traZODone (DESYREL) tablet 100 mg, 100 mg, Oral, Nightly  metroNIDAZOLE (FLAGYL) 500 mg in 0.9% NaCl 100 mL IVPB premix, 500 mg, IntraVENous, Q8H  0.9 % sodium chloride infusion, , IntraVENous, PRN  0.9 % sodium chloride infusion, , IntraVENous, PRN  cloNIDine (CATAPRES) 0.3 MG/24HR 1 patch, 1 patch, TransDERmal, Weekly  phenol 1.4 % mouth spray 1 spray, 1 spray, Mouth/Throat, Q2H PRN  pantoprazole (PROTONIX) 40 mg in sodium chloride (PF) 0.9 % 10 mL injection, 40 mg, IntraVENous, Q12H  sodium chloride flush 0.9 % injection 5-40 mL, 5-40 mL, IntraVENous, 2 times per day  sodium chloride flush 0.9 % injection 5-40 mL, 5-40 mL, IntraVENous, PRN  0.9 % sodium chloride infusion, , IntraVENous, PRN  enoxaparin (LOVENOX) injection 40 mg, 40 mg, SubCUTAneous, Daily  thiamine (B-1) injection 100 mg, 100 mg, IntraVENous, Daily  HYDROmorphone (DILAUDID) injection 0.25 mg, 0.25 mg, IntraVENous, Q3H PRN **OR** HYDROmorphone (DILAUDID) injection 0.5 mg, 0.5 mg, IntraVENous, Q3H PRN  sodium chloride flush 0.9 % injection 5-40 mL, 5-40 mL, IntraVENous, 2 times per day  sodium chloride flush 0.9 %  injection 5-40 mL, 5-40 mL, IntraVENous, PRN  0.9 % sodium chloride infusion, , IntraVENous, PRN  ondansetron (ZOFRAN-ODT) disintegrating tablet 4 mg, 4 mg, Oral, Q8H PRN **OR** ondansetron (ZOFRAN) injection 4 mg, 4 mg, IntraVENous, Q6H PRN  polyethylene glycol (GLYCOLAX) packet 17 g, 17 g, Oral, Daily PRN  acetaminophen (TYLENOL) tablet 650 mg, 650 mg, Oral, Q6H PRN **OR** acetaminophen (TYLENOL) suppository 650 mg, 650 mg, Rectal, Q6H PRN  glucose chewable tablet 16 g, 4 tablet, Oral, PRN  dextrose bolus 10% 125 mL, 125 mL, IntraVENous, PRN **OR** dextrose bolus 10% 250 mL, 250 mL, IntraVENous, PRN  glucagon injection 1 mg, 1 mg, SubCUTAneous, PRN  dextrose 10 % infusion, , IntraVENous, Continuous PRN    Vitals :     Vitals:    08/20/24 0639   BP:    Pulse:    Resp: 15   Temp:    SpO2:           Physical Examination :     Appearance: Alert, awake. NAD.   Respiratory:  No RD on room air.   Cardiovascular: Edema none  Abdomen:  soft  Other relevant findings: NG tube placed.    Labs :     CBC:   Recent Labs     08/18/24  0442 08/19/24  1235 08/20/24  0636   WBC 9.9 9.9 6.6   HGB 9.1* 8.8* 8.4*   HCT 28.2* 27.5* 25.5*   * 157 148     BMP:    Recent Labs     08/18/24  0442 08/18/24  1225 08/19/24  0453 08/19/24  1014     135* 139 130* 140   K 5.5*  5.5* 4.9 3.7 3.9   *  113* 116* 101 110   CO2 10*  12* 13* 19* 22   BUN 58*  56* 59* 50* 50*   CREATININE 2.2*  2.2* 2.3* 1.8* 1.8*   GLUCOSE 430*  419* 182* 1,346* 118*   MG 2.30  --  2.60*  --    PHOS 3.9  --  7.3* 3.6     Lab Results   Component Value Date/Time    COLORU Yellow 08/16/2024 04:35 PM    NITRU Negative 08/16/2024 04:35 PM    GLUCOSEU Negative 08/16/2024 04:35 PM    GLUCOSEU >=1000 mg/dL 06/07/2010 03:38 PM    KETUA Negative 08/16/2024 04:35 PM    UROBILINOGEN 0.2 08/16/2024 04:35 PM    BILIRUBINUR Negative 08/16/2024 04:35 PM        ----------------------------------------------------------  Please call with questions at      24 Hrs  Answering service [phone]  Perfect serve, or cell phone 7 am - 5pm  Glen Plascencia MD   [email or web address]

## 2024-08-20 NOTE — PROGRESS NOTES
[facility] -  Clinical Pharmacy Note    Parenteral Nutrition - Management by Pharmacy    Consult Date(s): 8/4/24 and 8/17/24 (discontinued / reinitiated same day 8/17/24)  Consulting Provider(s): Dr Butler / Dr Fairbanks    Assessment / Plan  1)  Gastric outlet obstruction 2/2 ulcerated mass, ileus - Parenteral Nutrition  Day of Therapy:17  Formulation:  Clinimix-E 5/20    Clinimix Rate:  70 ml/hr (Total volume = 1680 ml/day) - at goal rate  Do not recommend changing to cyclic TPN while attempting to advance TF.  Would only recommend cyclic TPN if planning for long term TPN as outpatient (cyclic TPN is NOT for weaning).   Lipids:  20% 250mL over 12 hours on Mon & Thurs only (due to national shortage)  Will re-assess with RD on Thursday 8/22 - if tolerating TF, may not need lipids.  Electrolytes:   Clinimix-E includes standard electrolyte formulation. Recommend further repletion with supplemental IVPBs as needed.   Maintenance IVF:  NS @ 100 mL/hr  Glucose control:    Pt has h/o DM.  Takes Lantus 8 units daily + Humalog 4 units TID with meals at home.  Glucoses ranged 150-226 since TPN hung last evening.  Used 2 units SSI since TPN hung last evening.  Will continue Regular insulin 40 units / day (48 units added to TPN bag to deliver 40 units / 24 hrs) in TPN bag.  Will monitor glucoses and adjust insulin in TPN bag as needed.     Add MVI 10 mL/day in PN on Mon-Wed-Fri only (due to national shortage) & Trace Elements 1 mL/day in PN daily.  Daily renal panel, daily magnesium, and weekly triglycerides ordered per protocol.  PN will be re-ordered daily.    Please call with questions--  Thanks--  Valorie Tam, PharmD, BCPS, BCGP  [id number] ([facility])   8/20/2024 10:36 AM      Interval update:   TF remain on hold; starting to use GT for meds today.  Continues on TPN at continuous rate of 70 mL/hr.    Subjective/Objective:   Agustina Fried is a 64 y.o. female with a PMHx significant for CAD, HTN, HLD, HFpEF, T2DM,

## 2024-08-20 NOTE — PROGRESS NOTES
Patient is A&Ox4, awake and in the bed. VSS, tolerating meds. Complaining of pain in the abdomen that is resolved with PRN pain medications. Found rectal tube had fallen out, planning on putting a new one in. Bed alarm is on and call light and table are within reach.    Electronically signed by Tami Hansen RN on 8/20/2024 at 5:56 PM

## 2024-08-20 NOTE — PROGRESS NOTES
General Surgery  Progress Note      Procedure(s) Performed: Diagnostic laparoscopy, ex-lap, jose-en-y gastrojejunostomy bypass, feeding tube placement, and extensive lysis of adhesions.     Subjective:   Continues to tolerate tube feeds. Dressing changed on AM rounds.     Objective:    Vitals:   Vitals:    08/19/24 2245 08/19/24 2322 08/20/24 0338 08/20/24 0344   BP: (!) 171/72   (!) 152/62   Pulse: 60   56   Resp: 16 15 18 16   Temp: 98.1 °F (36.7 °C)   98.5 °F (36.9 °C)   TempSrc: Oral   Oral   SpO2: 92%   98%   Weight:       Height:           Physical Exam:  General appearance: alert, no acute distress  Chest/Lungs: Normal effort with no accessory muscle use  Cardiovascular: RRR,  well perfused  Abdomen: Soft, non-distended, appropriately tender. Midline incision erythematous. Purulent discharge observed after staples were removed from midline incision. G-tube venting to drainage bag, J-tube to be clamped at all times.   Rectumn: Rectal tube with stool present      Assessment and Plan  This is a 64 y.o. year old female status post diagnostic laparoscopy, ex-lap, jose-en-y gastrojejunostomy bypass, feeding tube placement, and extensive lysis of adhesions secondary to duodenal obstruction. (8/11/24).    - J tube to be clamped. Okay to place medications through G port and clamp for 30 minutes after administration of medications.   - MMPC  - Hyperglycemia management per Medicine  - IS ordered to bedside, encourage hourly IS and deep breathing  - Okay for NPO sips of clears/chips, Tube feeds  - F/u with dietary for TPN recommendations   - Dooley in place, nephrology recs appreciated regarding increasing cret.    F/u urine creatinine   - PT/OT, OOB, ambulate in halls  - Will continue to monitor midline incision  - Midline packed with 1/4 inch iodoform to be changed daily, covered with 4 x 8s gauze and tape    Cecily Rojas, General Surgery  08/20/24  6:36 AM

## 2024-08-20 NOTE — PROGRESS NOTES
V2.0    [facility] Progress Note      Name:  Agustina Fried /Age/Sex: 1960  (64 y.o. female)   MRN & CSN:  [id number] & [id number] Encounter Date/Time: 2024 9:46 AM EDT   Location:  5319/5319-01 PCP: Viridiana Blanco APRN - NP     Attending:Umer Stevens MD       Hospital Day: 19    Assessment and Recommendations   Agustina Fried is a 64 y.o. female with pmh of CVA, depression, anxiety, diabetes, hypertension, heart failure, CHF, renal, breast cancer, colon cancer who presents with nausea vomiting and rectal bleeding.  CT showed severe gastric distention.  She underwent a EGD which showed duodenal mass resulting in obstruction.  Patient was transferred to [facility] for surgery oncology input.  Patient underwent diagnostic laparoscopy with exploratory laparotomy gastrojejunal bypass with feeding tube placement and lysis of additions on 2024.  Patient has a G-tube to gravity and had a j-tube for tube feeding.  J-tube placement is dislodged and cannot be used for tube feeding but can be used for medication per surgery.  Patient is on TPN at present.  Discussed with surgery patient can take some clears but diet cannot be advanced.  Patient also has C. difficile colitis with worsening diarrhea was treated with IV Flagyl and vancomycin enema.  When J-tube was present it was switched to enteral vancomycin but now back to vancomycin enema  Prognosis is poor with worsening SHARRON.  Patient is also not a candidate for chemotherapy.  Palliative care is consulted.  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

## 2024-08-20 NOTE — PROGRESS NOTES
[facility] - Clinical Pharmacy Note - Renal Dosing    Pt is on Metoclopramide 10mg IV q6h. Per [facility] Renal Dose Adjustment Policy, Metoclopramide dose will be changed to 5 mg IV q6h for est CrCl 30-60 mL/min.     Estimated Creatinine Clearance: Estimated Creatinine Clearance: 37 mL/min (A) (based on SCr of 1.5 mg/dL (H)).  Dialysis Status, SHARRON, CKD: SHARRON  BMI: Body mass index is 29.94 kg/m²..    Pharmacy will continue to monitor renal function and adjust dose as necessary.      Please call with questions--  Thanks--  Valorie Tam, PharmD, BCPS, BCGP  [id number] ([facility])   8/20/2024 10:41 AM

## 2024-08-20 NOTE — CARE COORDINATION
Patient admitted from [facility] and wanted to return there at d/c. The patient remains on TPN which they are not able to manage. Also made a referral to [facility] per her son's request but they are unable to manage TPN as well.     She has been accepted to [facility] and family prefers [facility] location. Precert was started on 8/19. Surgery has been informed. If patient will not be ready soon we will need to pull this precert.     Electronically signed by Elaina Rutherford RN on 8/20/2024 at 2:46 PM  [phone]

## 2024-08-20 NOTE — CONSULTS
Nutrition Assessment     NUTRITION RECOMMENDATION:    PO diet: NPO with sips of clears and popsicles per MD  Nutrition Support:  Recommend resume enteral feeds at trophic rate of 10 ml/hr, and as tolerated advance by 10 ml Q8 hrs towards goal rate of 45 ml/hr  Water Flushes: 30ml q4 (Maintenance)  Recommend continue current TPN Clinimix 5/20 at 70 ml/hr continuous. If Pt tolerates EN advancement over the next 24 hrs, RD will provide recommendations to begin TPN wean.   TPN should run continuous rather than cyclic when weaning- current goal is to decrease rate and volume rather than run time    NUTRITION ASSESSMENT:   Nutritional summary & status: J-tube remains clamped, meds being administered through g-tube. Inquired with surgery regarding resuming enteral feeds with no difinitive response. Continues on TPN to provide 100% of nutrition needs however goal is to d/c without TPN with EN at goal rate. EN has been held since 8/17 after one episode of emesis and pt had been tolerating close to goal rate. RD recommends resuming trophic feeds to assess tolerance, will continue to monitor.       Belinda Riggs RD  [place]:  [phone]  Office:  [phone]     Progress Note  PULMONARY    Admit Date: 3/18/2019   03/20/2019      SUBJECTIVE:     March 20, 2019-patient continues to have severe burning chest pain and severe dyspnea exertion.      PFSH and Allergies reviewed.    OBJECTIVE:     Vitals (Most recent):  Vitals:    03/20/19 1215   BP: (!) 173/102   Pulse: 89   Resp: 18   Temp:        Vitals (24 hour range):  Temp:  [96.6 °F (35.9 °C)-97.8 °F (36.6 °C)]   Pulse:  [63-98]   Resp:  [18]   BP: (130-196)/()   SpO2:  [93 %-99 %]       Intake/Output Summary (Last 24 hours) at 3/20/2019 1231  Last data filed at 3/20/2019 1215  Gross per 24 hour   Intake 1300 ml   Output 6000 ml   Net -4700 ml          Physical Exam:  The patient's neuro status (alertness,orientation,cognitive function,motor skills,), pharyngeal exam (oral lesions, hygiene, abn dentition,), Neck (jvd,mass,thyroid,nodes in neck and above/below clavicle),RESPIRATORY(symmetry,effort,fremitus,percussion,auscultation),  Cor(rhythm,heart tones including gallops,perfusion,edema)ABD(distention,hepatic&splenomegaly,tenderness,masses), Skin(rash,cyanosis),Psyc(affect,judgement,).  Exam negative except for these pertinent findings:    Alert, faint wheeze, symmetric chest, no distress, normal percussion, normal fremitus, no edema    Radiographs reviewed: view by direct vision -vascular congestion seen on chest x-ray at admit, no change  Results for orders placed during the hospital encounter of 03/11/19   X-Ray Chest 1 View    Narrative EXAMINATION:  XR CHEST 1 VIEW    CLINICAL HISTORY:  post HD;    TECHNIQUE:  Single frontal view of the chest was performed.    COMPARISON:  3/11/2019    FINDINGS:  The heart is enlarged.  The cardiomediastinal silhouette is stable.  There is no confluent infiltrate.  There is no pleural effusion.  There are median sternotomy sutures      Impression Cardiomegaly.  No acute cardiopulmonary disease      Electronically signed by: Mayuri Castro MD  Date:    03/14/2019  Time:    10:37    ]    6 min walk study was done March 19, 2019.  Baseline room air saturation was 97%.  With 6 min of walking O2 sat varied from 94-98%.  There was no severe desaturation.  Patient walked 340 m or 69% of the reference distance.        Spirometry was on March 19, 2019. the FEV1 to FVC ratio was 83%.  There is no airflow obstruction.  The FEV1 measures 66% with a forced vital capacity 58%.  Patient may have air trapping or restrictive disease, clinical correlation recommended.     Spirometry is abnormal.  Labs     Recent Labs   Lab 03/20/19 0421   WBC 8.90   HGB 10.8*   HCT 33.0*   *     Recent Labs   Lab 03/20/19 0421 03/20/19  1150   *  --    K 5.0  --    CL 98  --    CO2 20*  --    *  --    CREATININE 5.4*  --    *  --    CALCIUM 7.7*  --    INR 4.2*  --    CPK  --  140   No results for input(s): PH, PCO2, PO2, HCO3 in the last 24 hours.  Microbiology Results (last 7 days)     ** No results found for the last 168 hours. **          Impression:  Active Hospital Problems    Diagnosis  POA    *Severe persistent asthma with acute exacerbation [J45.51]  Yes    SOB (shortness of breath) [R06.02]  Yes    Acute on chronic diastolic congestive heart failure [I50.33]  Yes    Type 2 diabetes mellitus, without long-term current use of insulin [E11.9]  Yes    Hypertension due to end stage renal disease caused by type 2 diabetes mellitus, on dialysis [E11.22, I12.0, Z99.2, N18.6]  Not Applicable    Pulmonary hypertension [I27.20]  Yes    Persistent atrial fibrillation [I48.1]  Yes    ESRD (end stage renal disease) [N18.6]  Yes    Immune deficiency disorder [D84.9]  Yes    Secondary hyperparathyroidism [N25.81]  Yes    Anemia due to chronic kidney disease, on chronic dialysis [N18.6, D63.1, Z99.2]  Not Applicable    Vertigo [R42]  Yes      Resolved Hospital Problems   No resolved problems to display.               Plan:     March 20th-his baseline FEV1 was 71% of predicted last year and  currently he measures 66%.  He maintains some faint expiratory wheezes in the posterior lung bases refractory to steroids and bronchodilators.  Case is discussed with Cardiology, and would be inclined to believe his heart may be the source of his problems particularly with his exertional burning chest pain etc his respiratory reserve should be more than adequate for any needed cardiac procedures including surgery.    Will decrease steroids from 160 a day to 40 a day                                  .

## 2024-08-20 NOTE — PROGRESS NOTES
injury in the past year?: Yes (a couple falls, Pt reported none since previous admission in july)  ADL Assistance: Independent  Homemaking Responsibilities: No  Ambulation Assistance: Independent  Transfer Assistance: Independent  Active : No  Patient's  Info: uses a cab to get to appointments and a friend to go to the grocery store  Occupation: Retired  Additional Comments: Pt from home but was at SNF since last admission at beginning of month. Pt reports at rehab was mostly IND with ADLs       Objective   Temp: 97.4 °F (36.3 °C)  Pulse: 61  Heart Rate Source: Monitor  Respirations: 16  SpO2: 100 %  O2 Device: None (Room air)  BP: (!) 149/57  MAP (Calculated): 88  BP Location: Left upper arm  BP Method: Automatic  Patient Position: Semi fowlers             Safety Devices  Type of Devices: Bed alarm in place;Nurse notified;Call light within reach;Left in bed  Balance  Standing: With support (SBA at RW)  Transfer Training  Sit to Stand: Stand-by assistance (from EOB to RW)  Stand to Sit: Contact-guard assistance (decreased eccentric control)  Toilet Transfer: Stand-by assistance (assist for IV pole mgmt, use of GB on L to stand)  Gait  Gait Training: Yes  Overall Level of Assistance: Stand-by assistance  Distance (ft): 20 Feet  Assistive Device: Gait belt;Walker, rolling        ADL  Grooming: Stand by assistance  Grooming Skilled Clinical Factors: in stance at sink for hand hygiene  LE Bathing: Dependent/Total  LE Bathing Skilled Clinical Factors: pt req total A for perihygiene as pt had loose stool and was unaware, SBA for balance during  UE Dressing: Minimal assistance  UE Dressing Skilled Clinical Factors: to doff/down new gown  Skin Care: Chlorhexidine solution        Bed mobility  Supine to Sit: Stand by assistance  Sit to Supine: Stand by assistance        Cognition  Overall Cognitive Status: Exceptions  Arousal/Alertness: Delayed responses to stimuli  Following Commands: Follows one step commands  with increased time;Follows one step commands with repetition  Attention Span: Impaired  Safety Judgement: Decreased awareness of need for safety;Decreased awareness of need for assistance  Problem Solving: Decreased awareness of errors;Able to problem solve independently;Assistance required to generate solutions;Assistance required to implement solutions;Assistance required to identify errors made  Orientation  Overall Orientation Status: Within Functional Limits                  Education Given To: Patient  Education Provided: Role of Therapy;Plan of Care;ADL Adaptive Strategies;Transfer Training  Education Provided Comments: importance of OOB and increased mobility  Education Method: Verbal  Barriers to Learning: Cognition  Education Outcome: Continued education needed;Verbalized understanding                        G-Code     OutComes Score                                                  AM-PAC - ADL  AM-PAC Daily Activity - Inpatient   How much help is needed for putting on and taking off regular lower body clothing?: A Lot  How much help is needed for bathing (which includes washing, rinsing, drying)?: A Lot  How much help is needed for toileting (which includes using toilet, bedpan, or urinal)?: A Lot  How much help is needed for putting on and taking off regular upper body clothing?: A Lot  How much help is needed for taking care of personal grooming?: A Little  How much help for eating meals?: A Little  AM-[facility] Inpatient Daily Activity Raw Score: 14  AM-PAC Inpatient ADL T-Scale Score : 33.39  ADL Inpatient CMS 0-100% Score: 59.67  ADL Inpatient CMS G-Code Modifier : CK    Tinneti Score       Goals  Short Term Goals  Time Frame for Short Term Goals: by dc  Short Term Goal 1: Pt will complete toileting w/ spvn- ongoing  Short Term Goal 2: Pt will complete functional transfers w/ spvn- ongoing  Short Term Goal 3: Pt will complete LE dressing w/ spvn- not addressed due to flexiseal  Patient Goals   Patient  goals : \"Get my strength back.\"       Therapy Time   Individual Concurrent Group Co-treatment   Time In 1403         Time Out 1430         Minutes 27         Timed Code Treatment Minutes: 27 Minutes       Ophelia Cruz, OT

## 2024-08-20 NOTE — PROGRESS NOTES
Patient alert to self. Midline incision CDI. Prn pain medications administered per orders, see MAR. Dooley draining, output documented. Call light within reach. Pt denies needs at this time.

## 2024-08-20 NOTE — PROGRESS NOTES
ID Follow-up NOTE    CC:   Recurrent C diff infection   Antibiotics: Enteral Vanco, IV Metronidazole    Admit Date: 8/2/2024  Hospital Day: 19    Subjective:     POD#8 RADHA, gastroduodenectomy with reanastomosis, J-tube    Patient c/o abd pain       Objective:     Patient Vitals for the past 8 hrs:   BP Temp Temp src Pulse Resp SpO2   08/20/24 0639 -- -- -- -- 15 --   08/20/24 0408 -- -- -- -- 14 --   08/20/24 0344 (!) 152/62 98.5 °F (36.9 °C) Oral 56 16 98 %   08/20/24 0338 -- -- -- -- 18 --     I/O last 3 completed shifts:  In: 2742.6 [P.O.:90; I.V.:1360; IV Piggyback:151.9]  Out: 3845 [Urine:2575; Emesis/NG output:170; Stool:1100]  No intake/output data recorded.    EXAM:  GENERAL: No apparent distress.  RA  HEENT: Membranes moist, no oral lesion  NECK:  Supple, no lymphadenopathy  LUNGS: Clear b/l, no rales, no dullness  CARDIAC: RRR, no murmur appreciated  ABD:  Hypoactive BS, soft , diffuse tenderness, J-tube, midline wound w staples  EXT:  No rash, no edema, no lesions  NEURO: No focal neurologic findings  PSYCH: Orientation, sensorium, mood normal  LINES:  R PICC, placed 8/4       Data Review:  Lab Results   Component Value Date    WBC 6.6 08/20/2024    HGB 8.4 (L) 08/20/2024    HCT 25.5 (L) 08/20/2024    MCV 90.3 08/20/2024     08/20/2024     Lab Results   Component Value Date    CREATININE 1.5 (H) 08/20/2024    BUN 44 (H) 08/20/2024     08/20/2024    K 3.9 08/20/2024     08/20/2024    CO2 29 08/20/2024       Hepatic Function Panel:   Lab Results   Component Value Date/Time    ALKPHOS 211 08/19/2024 04:53 AM    ALT <5 08/19/2024 04:53 AM    AST 9 08/19/2024 04:53 AM    BILITOT <0.2 08/19/2024 04:53 AM    BILIDIR 0.2 08/19/2024 04:53 AM    IBILI see below 08/19/2024 04:53 AM       Micro:   - Stool C difficile toxin B gene detected (8/1/24)  - Urine culture positive for E coli (7/31/24)  Antibiotic Interpretation Microscan     ampicillin Resistant >=32 mcg/mL   ampicillin-sulbactam Sensitive  reanastomosis, J-tube)     IMP/  UTI vs bacteriuria      C diff + by PCR,    + diarrhea   + colitis on CT, extending to rectosigmoid colon - reviewed w Radiologist, Dr Robison     Cr 1.5  Last WBC 6.6    Plan:     Cont iv Metronidazole  Start enteral Vancomycin by G/J tube    No other antimicrobial, important to avoid other antibiotic use as possible      Postop care per Surg    Medical Decision Making:  The following items were considered in medical decision making:  Discussion of patient care with other providers  Reviewed clinical lab tests  Reviewed radiology tests  Reviewed other diagnostic tests/interventions  Independent review of radiologic images last week with Radiologist   Microbiology cultures and other micro tests reviewed      Discussed with pt, RN    Deondre Payton MD

## 2024-08-21 LAB
ALBUMIN SERPL-MCNC: 1.8 G/DL (ref 3.4–5)
ALP SERPL-CCNC: 567 U/L (ref 40–129)
ALT SERPL-CCNC: 9 U/L (ref 10–40)
ANION GAP SERPL CALCULATED.3IONS-SCNC: 5 MMOL/L (ref 3–16)
AST SERPL-CCNC: 38 U/L (ref 15–37)
BASOPHILS # BLD: 0 K/UL (ref 0–0.2)
BASOPHILS NFR BLD: 0.3 %
BILIRUB DIRECT SERPL-MCNC: 0.2 MG/DL (ref 0–0.3)
BILIRUB INDIRECT SERPL-MCNC: 0.2 MG/DL (ref 0–1)
BILIRUB SERPL-MCNC: 0.4 MG/DL (ref 0–1)
BUN SERPL-MCNC: 36 MG/DL (ref 7–20)
CALCIUM SERPL-MCNC: 7 MG/DL (ref 8.3–10.6)
CHLORIDE SERPL-SCNC: 110 MMOL/L (ref 99–110)
CO2 SERPL-SCNC: 24 MMOL/L (ref 21–32)
CREAT SERPL-MCNC: 1.3 MG/DL (ref 0.6–1.2)
DEPRECATED RDW RBC AUTO: 15.5 % (ref 12.4–15.4)
EOSINOPHIL # BLD: 0.1 K/UL (ref 0–0.6)
EOSINOPHIL NFR BLD: 1.3 %
GFR SERPLBLD CREATININE-BSD FMLA CKD-EPI: 46 ML/MIN/{1.73_M2}
GLUCOSE BLD-MCNC: 149 MG/DL (ref 70–99)
GLUCOSE BLD-MCNC: 154 MG/DL (ref 70–99)
GLUCOSE BLD-MCNC: 172 MG/DL (ref 70–99)
GLUCOSE BLD-MCNC: 183 MG/DL (ref 70–99)
GLUCOSE BLD-MCNC: 195 MG/DL (ref 70–99)
GLUCOSE BLD-MCNC: 225 MG/DL (ref 70–99)
GLUCOSE SERPL-MCNC: 186 MG/DL (ref 70–99)
HCT VFR BLD AUTO: 24.7 % (ref 36–48)
HGB BLD-MCNC: 8.3 G/DL (ref 12–16)
LYMPHOCYTES # BLD: 1.2 K/UL (ref 1–5.1)
LYMPHOCYTES NFR BLD: 17.9 %
MAGNESIUM SERPL-MCNC: 1.9 MG/DL (ref 1.8–2.4)
MCH RBC QN AUTO: 30.3 PG (ref 26–34)
MCHC RBC AUTO-ENTMCNC: 33.6 G/DL (ref 31–36)
MCV RBC AUTO: 90.1 FL (ref 80–100)
MONOCYTES # BLD: 0.6 K/UL (ref 0–1.3)
MONOCYTES NFR BLD: 8.7 %
NEUTROPHILS # BLD: 4.7 K/UL (ref 1.7–7.7)
NEUTROPHILS NFR BLD: 71.8 %
PERFORMED ON: ABNORMAL
PHOSPHATE SERPL-MCNC: 3 MG/DL (ref 2.5–4.9)
PLATELET # BLD AUTO: 166 K/UL (ref 135–450)
PMV BLD AUTO: 9.9 FL (ref 5–10.5)
POTASSIUM SERPL-SCNC: 4.1 MMOL/L (ref 3.5–5.1)
PROT SERPL-MCNC: 6.5 G/DL (ref 6.4–8.2)
RBC # BLD AUTO: 2.74 M/UL (ref 4–5.2)
SODIUM SERPL-SCNC: 139 MMOL/L (ref 136–145)
WBC # BLD AUTO: 6.5 K/UL (ref 4–11)

## 2024-08-21 PROCEDURE — 2580000003 HC RX 258

## 2024-08-21 PROCEDURE — 97530 THERAPEUTIC ACTIVITIES: CPT

## 2024-08-21 PROCEDURE — 1200000000 HC SEMI PRIVATE

## 2024-08-21 PROCEDURE — 6360000002 HC RX W HCPCS

## 2024-08-21 PROCEDURE — 80069 RENAL FUNCTION PANEL: CPT

## 2024-08-21 PROCEDURE — 99232 SBSQ HOSP IP/OBS MODERATE 35: CPT | Performed by: INTERNAL MEDICINE

## 2024-08-21 PROCEDURE — 85025 COMPLETE CBC W/AUTO DIFF WBC: CPT

## 2024-08-21 PROCEDURE — 6370000000 HC RX 637 (ALT 250 FOR IP): Performed by: INTERNAL MEDICINE

## 2024-08-21 PROCEDURE — 80076 HEPATIC FUNCTION PANEL: CPT

## 2024-08-21 PROCEDURE — 2500000003 HC RX 250 WO HCPCS

## 2024-08-21 PROCEDURE — 97116 GAIT TRAINING THERAPY: CPT

## 2024-08-21 PROCEDURE — 6370000000 HC RX 637 (ALT 250 FOR IP)

## 2024-08-21 PROCEDURE — 83735 ASSAY OF MAGNESIUM: CPT

## 2024-08-21 RX ORDER — VANCOMYCIN HYDROCHLORIDE 50 MG/ML
250 KIT ORAL EVERY 6 HOURS SCHEDULED
Status: DISCONTINUED | OUTPATIENT
Start: 2024-08-21 | End: 2024-08-22 | Stop reason: HOSPADM

## 2024-08-21 RX ORDER — HYDRALAZINE HYDROCHLORIDE 20 MG/ML
5 INJECTION INTRAMUSCULAR; INTRAVENOUS EVERY 6 HOURS PRN
Status: DISCONTINUED | OUTPATIENT
Start: 2024-08-21 | End: 2024-08-22 | Stop reason: HOSPADM

## 2024-08-21 RX ADMIN — MICONAZOLE NITRATE: 20 POWDER TOPICAL at 07:58

## 2024-08-21 RX ADMIN — METOCLOPRAMIDE HYDROCHLORIDE 5 MG: 5 INJECTION INTRAMUSCULAR; INTRAVENOUS at 09:51

## 2024-08-21 RX ADMIN — METRONIDAZOLE 500 MG: 500 INJECTION, SOLUTION INTRAVENOUS at 06:30

## 2024-08-21 RX ADMIN — VANCOMYCIN HYDROCHLORIDE 250 MG: 250 POWDER, FOR SOLUTION ORAL at 17:34

## 2024-08-21 RX ADMIN — TRAZODONE HYDROCHLORIDE 100 MG: 100 TABLET ORAL at 21:59

## 2024-08-21 RX ADMIN — SODIUM CHLORIDE, PRESERVATIVE FREE 10 ML: 5 INJECTION INTRAVENOUS at 22:02

## 2024-08-21 RX ADMIN — INSULIN LISPRO 2 UNITS: 100 INJECTION, SOLUTION INTRAVENOUS; SUBCUTANEOUS at 04:20

## 2024-08-21 RX ADMIN — ASCORBIC ACID, VITAMIN A PALMITATE, CHOLECALCIFEROL, THIAMINE HYDROCHLORIDE, RIBOFLAVIN-5 PHOSPHATE SODIUM, PYRIDOXINE HYDROCHLORIDE, NIACINAMIDE, DEXPANTHENOL, ALPHA-TOCOPHEROL ACETATE, VITAMIN K1, FOLIC ACID, BIOTIN, CYANOCOBALAMIN: 200; 3300; 200; 6; 3.6; 6; 40; 15; 10; 150; 600; 60; 5 INJECTION, SOLUTION INTRAVENOUS at 17:39

## 2024-08-21 RX ADMIN — VANCOMYCIN HYDROCHLORIDE 250 MG: 250 POWDER, FOR SOLUTION ORAL at 11:55

## 2024-08-21 RX ADMIN — METOCLOPRAMIDE HYDROCHLORIDE 5 MG: 5 INJECTION INTRAMUSCULAR; INTRAVENOUS at 21:59

## 2024-08-21 RX ADMIN — HYDROMORPHONE HYDROCHLORIDE 0.5 MG: 1 INJECTION, SOLUTION INTRAMUSCULAR; INTRAVENOUS; SUBCUTANEOUS at 09:51

## 2024-08-21 RX ADMIN — Medication 500000 UNITS: at 17:35

## 2024-08-21 RX ADMIN — METOCLOPRAMIDE HYDROCHLORIDE 5 MG: 5 INJECTION INTRAMUSCULAR; INTRAVENOUS at 04:21

## 2024-08-21 RX ADMIN — ENOXAPARIN SODIUM 40 MG: 100 INJECTION SUBCUTANEOUS at 09:52

## 2024-08-21 RX ADMIN — Medication 500000 UNITS: at 22:00

## 2024-08-21 RX ADMIN — THIAMINE HYDROCHLORIDE 100 MG: 100 INJECTION, SOLUTION INTRAMUSCULAR; INTRAVENOUS at 09:51

## 2024-08-21 RX ADMIN — VANCOMYCIN HYDROCHLORIDE 250 MG: 250 POWDER, FOR SOLUTION ORAL at 07:06

## 2024-08-21 RX ADMIN — HYDROMORPHONE HYDROCHLORIDE 0.5 MG: 1 INJECTION, SOLUTION INTRAMUSCULAR; INTRAVENOUS; SUBCUTANEOUS at 01:30

## 2024-08-21 RX ADMIN — SODIUM CHLORIDE, PRESERVATIVE FREE 10 ML: 5 INJECTION INTRAVENOUS at 09:58

## 2024-08-21 RX ADMIN — METOCLOPRAMIDE HYDROCHLORIDE 5 MG: 5 INJECTION INTRAMUSCULAR; INTRAVENOUS at 15:39

## 2024-08-21 RX ADMIN — MICONAZOLE NITRATE: 20 POWDER TOPICAL at 22:01

## 2024-08-21 RX ADMIN — CALCIUM POLYCARBOPHIL 625 MG: 625 TABLET ORAL at 09:51

## 2024-08-21 RX ADMIN — HYDROMORPHONE HYDROCHLORIDE 0.5 MG: 1 INJECTION, SOLUTION INTRAMUSCULAR; INTRAVENOUS; SUBCUTANEOUS at 16:34

## 2024-08-21 RX ADMIN — Medication 500000 UNITS: at 13:00

## 2024-08-21 RX ADMIN — VANCOMYCIN HYDROCHLORIDE 250 MG: 250 POWDER, FOR SOLUTION ORAL at 00:35

## 2024-08-21 RX ADMIN — SODIUM CHLORIDE, PRESERVATIVE FREE 40 MG: 5 INJECTION INTRAVENOUS at 21:59

## 2024-08-21 RX ADMIN — SODIUM CHLORIDE, PRESERVATIVE FREE 40 MG: 5 INJECTION INTRAVENOUS at 09:52

## 2024-08-21 RX ADMIN — Medication 500000 UNITS: at 09:57

## 2024-08-21 ASSESSMENT — PAIN SCALES - WONG BAKER
WONGBAKER_NUMERICALRESPONSE: NO HURT
WONGBAKER_NUMERICALRESPONSE: NO HURT

## 2024-08-21 ASSESSMENT — PAIN DESCRIPTION - ONSET
ONSET: ON-GOING

## 2024-08-21 ASSESSMENT — PAIN DESCRIPTION - ORIENTATION
ORIENTATION: MID

## 2024-08-21 ASSESSMENT — PAIN DESCRIPTION - DESCRIPTORS
DESCRIPTORS: DISCOMFORT;CRAMPING
DESCRIPTORS: ACHING;DISCOMFORT

## 2024-08-21 ASSESSMENT — PAIN DESCRIPTION - LOCATION
LOCATION: ABDOMEN

## 2024-08-21 ASSESSMENT — PAIN DESCRIPTION - PAIN TYPE
TYPE: SURGICAL PAIN

## 2024-08-21 ASSESSMENT — PAIN SCALES - GENERAL
PAINLEVEL_OUTOF10: 2
PAINLEVEL_OUTOF10: 9
PAINLEVEL_OUTOF10: 2
PAINLEVEL_OUTOF10: 2
PAINLEVEL_OUTOF10: 3

## 2024-08-21 ASSESSMENT — PAIN DESCRIPTION - FREQUENCY
FREQUENCY: CONTINUOUS

## 2024-08-21 NOTE — PROGRESS NOTES
[facility] -  Clinical Pharmacy Note    Parenteral Nutrition - Management by Pharmacy    Consult Date(s): 8/4/24 and 8/17/24 (discontinued / reinitiated same day 8/17/24)  Consulting Provider(s): Dr Butler / Dr Fairbanks    Assessment / Plan  1)  Gastric outlet obstruction 2/2 ulcerated mass, ileus - Parenteral Nutrition  Day of Therapy:18  Formulation:  Clinimix-E 5/20    Clinimix Rate:  70 ml/hr (Total volume = 1680 ml/day) - at goal rate  Do not recommend changing to cyclic TPN while attempting to advance TF.  Would only recommend cyclic TPN if planning for long term TPN as outpatient (cyclic TPN is NOT for weaning).   Lipids:  20% 250mL over 12 hours on Mon & Thurs only (due to national shortage)  Will re-assess with RD on Thursday 8/22 - if tolerating TF, may not need lipids.  Electrolytes:   Clinimix-E includes standard electrolyte formulation. Recommend further repletion with supplemental IVPBs as needed.   Maintenance IVF:  NS @ 75 mL/hr (managed by nephro)  Glucose control:    Pt has h/o DM.  Takes Lantus 8 units daily + Humalog 4 units TID with meals at home.  Glucoses ranged  156-225 since TPN hung last evening (with all but one glucose < 200).  Used 2 units SSI since TPN hung last evening.  Will continue Regular insulin 40 units / day (48 units added to TPN bag to deliver 40 units / 24 hrs) in TPN bag.  Will monitor glucoses and adjust insulin in TPN bag as needed.     Add MVI 10 mL/day in PN on Mon-Wed-Fri only (due to national shortage) & Trace Elements 1 mL/day in PN daily.  Daily renal panel, daily magnesium, and weekly triglycerides ordered per protocol.  PN will be re-ordered daily.    Please call with questions--  Thanks--  Valorie Tam, PharmD, BCPS, BCGP  [id number] ([facility])   8/21/2024 9:37 AM      Interval update:   TF remain on hold; discussing possibly started TF at low rate today.  Continues on TPN at continuous rate of 70 mL/hr.    Subjective/Objective:   Agustina Fried is a 64  y.o. 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  PICC placed and TPN started 8/4/24.  Pt is s/p Dx lap, ex lap, Viviana-en-Y gastrojejunostomy byapss, feeding tube placement, and extensive RADHA (done 8/12).      Pharmacy is consulted to manage parenteral nutrition.    Ht Readings from Last 1 Encounters:   08/02/24 1.6 m (5' 2.99\")     Wt Readings from Last 1 Encounters:   08/18/24 76.7 kg (169 lb)     Recent Labs     08/20/24  0636 08/21/24  0530 08/21/24  0550     --  139   K 3.9  --  4.1     --  110   CO2 29  --  24   PHOS 3.5  --  3.0   GLUCOSE 160*  --  186*   BUN 44*  --  36*   CREATININE 1.5*  --  1.3*   CALCIUM 7.4*  --  7.0*   MG 2.00 1.90  --      Albumin   Date Value Ref Range Status   08/21/2024 1.8 (L) 3.4 - 5.0 g/dL Final     Corrected Calcium: 9.9 mg/dL    Recent Labs     08/20/24  2017 08/21/24  0002 08/21/24  0343 08/21/24  0807   POCGLU 156* 183* 225* 154*     Sliding scale insulin used since PN bag hung yesterday: 2 units    Recent Labs     08/20/24  0636 08/21/24  0550   WBC 6.6 6.5   HGB 8.4* 8.3*    166     Triglycerides   Date Value Ref Range Status   08/18/2024 34 0 - 150 mg/dL Final   08/15/2024 36 0 - 150 mg/dL Final   08/11/2024 20 0 - 150 mg/dL Final   08/06/2024 88 0 - 150 mg/dL Final   08/05/2024 91 0 - 150 mg/dL Final

## 2024-08-21 NOTE — PROGRESS NOTES
Received call from [facility] pt abdominal fluid culture positive for VRE and E. Coli ESBL. Perfect serve sent to hospitalist and surgery team. Pt already placed in contact precautions.

## 2024-08-21 NOTE — PROGRESS NOTES
Physical Therapy  Facility/Department: [facility]  Physical Therapy Treatment    Name: Agustina Fried  : 1960  MRN: [id number]  Date of Service: 2024    Discharge Recommendations:  Subacute/Skilled Nursing Facility   PT Equipment Recommendations  Other: plan to continue to assess and likely defer recommendations to the next level of care      Patient Diagnosis(es): Diagnoses of Small bowel obstruction (HCC), Rectal bleeding, and Duodenal obstruction were pertinent to this visit.  Past Medical History:  has a past medical history of Abnormal brain MRI, Acute bilateral low back pain without sciatica, SHARRON (acute kidney injury) (HCC), Arthritis, Bipolar disorder (HCC), CAD (coronary artery disease), Cancer (HCC), Carotid stenosis, bilateral:<50%:per [place] 2016, Carpal tunnel syndrome, Cervical cancer screening, Coronary artery disease of native artery of native heart with stable angina pectoris (HCC), DDD (degenerative disc disease), lumbar, Depression, Depression/anxiety, Depression/anxiety, Diabetes mellitus (HCC), Gout, History of mammogram, History of therapeutic radiation, Hyperlipidemia, Hypertension, 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  Past Surgical History:  has a past surgical history that includes Kidney removal; Hysterectomy; Breast lumpectomy (); Tubal ligation; other surgical history (Right); Cardiac catheterization (2020); Upper gastrointestinal endoscopy (N/A, 2021); Colonoscopy (N/A, 2021); CT BIOPSY BONE MARROW (2/3/2021); Temporal Artery Biopsy (Right, 2021); Upper gastrointestinal endoscopy (N/A,  errors;Assistance required to generate solutions;Assistance required to implement solutions;Assistance required to identify errors made  Insights: Decreased awareness of deficits     Objective   Temp: 99.1 °F (37.3 °C)  Pulse: 59  Heart Rate Source: Monitor  Respirations: 16  SpO2: 97 %  O2 Device: None (Room air)  BP: (!) 140/59  MAP (Calculated): 86  BP Location: Left upper arm  BP Method: Automatic  Patient Position: Semi fowlers        Bed mobility  Supine to Sit: Minimal assistance (with elevated head of bed and use of rail, increased time)  Scooting: Stand by assistance (seated at edge of bed)  Transfers  Sit to Stand: Contact guard assistance (to RW)  Stand to Sit: Contact guard assistance (from RW)  Stand Pivot Transfers: Contact guard assistance (with RW)  Comment: toilet transfer with RW and CGA  Ambulation  Surface: Level tile  Device: Rolling Walker  Assistance: Contact guard assistance  Quality of Gait: slow mauro, forward flexed posture, decreased (B) step length, foot clearance, and heel strike; wide base of support  Distance: 20ft x 2  Comments: declines additional gait secondary to fatigue     Balance  Posture: Fair (forward flexed, rounded shoulders)  Sitting - Static: Good  Sitting - Dynamic: Good;-  Standing - Static: Fair  Standing - Dynamic: Fair;-  Comments: SBA to CGA with RW for standing balance during mobility         AM-PAC - Mobility    AM-PAC Basic Mobility - Inpatient   How much help is needed turning from your back to your side while in a flat bed without using bedrails?: A Little  How much help is needed moving from lying on your back to sitting on the side of a flat bed without using bedrails?: A Little  How much help is needed moving to and from a bed to a chair?: A Little  How much help is needed standing up from a chair using your arms?: A Little  How much help is needed walking in hospital room?: A Little  How much help is needed climbing 3-5 steps with a railing?: A  Lot  AM-PAC Inpatient Mobility Raw Score : 17  AM-PAC Inpatient T-Scale Score : 42.13  Mobility Inpatient CMS 0-100% Score: 50.57  Mobility Inpatient CMS G-Code Modifier : CK    Goals  Short Term Goals  Time Frame for Short Term Goals: discharge - all goals ongoing 8/21/24  Short Term Goal 1: sit to/from supine supervision  Short Term Goal 2: sit to/from stand supervision  Short Term Goal 3: ambulate 100 ft with or without AD supervision  Patient Goals   Patient Goals : per chart, planning for SNF upon d/c with eventual return to home       Education  Patient Education  Education Given To: Patient  Education Provided: Role of Therapy;Plan of Care  Education Provided Comments: importance of OOB, mobility, use of call light, PT recommendations  Education Method: Demonstration;Verbal  Barriers to Learning: Cognition  Education Outcome: Continued education needed      Therapy Time   Individual Concurrent Group Co-treatment   Time In 1210         Time Out 1233         Minutes 23              Timed Code Treatment Minutes: 23 minutes    Total Treatment Minutes: 23 Minutes    If patient discharges prior to next treatment, this note will serve as discharge summary.    Nhi Jaimes PT, DPT #[id number]

## 2024-08-21 NOTE — PROGRESS NOTES
V2.0    [facility] [place]      Name:  Agustina Fried /Age/Sex: 1960  (64 y.o. female)   MRN & CSN:  [id number] & [id number] Encounter Date/Time: 2024 9:46 AM EDT   Location:  5319/5319-01 PCP: Viridiana Blanco APRN - NP     Attending:Zahida Koenig MD       Hospital Day: 20    Assessment and Recommendations   Agustina Fried is a 64 y.o. female with pmh of CVA, depression, anxiety, diabetes, hypertension, heart failure, CHF, renal, breast cancer, colon cancer who presents with nausea vomiting and rectal bleeding.  CT showed severe gastric distention.  She underwent a EGD which showed duodenal mass resulting in obstruction.  Patient was transferred to [facility] for surgery oncology input.  Patient underwent diagnostic laparoscopy with exploratory laparotomy gastrojejunal bypass with feeding tube placement and lysis of additions on 2024.  Patient has a G-tube to gravity and had a j-tube for tube feeding.  J-tube placement is dislodged and cannot be used for tube feeding but can be used for medication per surgery.  Patient is on TPN at present.  Discussed with surgery patient can take some clears but diet cannot be advanced.  Patient also has C. difficile colitis with worsening diarrhea was treated with IV Flagyl and vancomycin enema.  When J-tube was present it was switched to enteral vancomycin but now back to vancomycin enema  Prognosis is poor with worsening SHARRON.  Patient is also not a candidate for chemotherapy.  Palliative care is consulted.  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  Unclear plan for long-term nutrition  -s/p diagnostic laparoscopy with exploratory laparotomy gastrojejunal bypass with feeding tube placement and lysis of additions on 2024   -CT showed a 6 cm area of necrotic mass along the duodenum which  infections caused  by ESBL producing organisms.  Other antibiotic classes are  likely to result in treatment failure, even for organisms  demonstrating in vitro susceptibility.       Blood Cultures:   Lab Results   Component Value Date/Time    BC No Growth after 4 days of incubation. 04/17/2024 06:19 AM     Lab Results   Component Value Date/Time    BLOODCULT2 No Growth after 4 days of incubation. 04/17/2024 06:19 AM     Organism:   Lab Results   Component Value Date/Time    ORG Enterococcus faecium VRE 08/19/2024 07:05 AM    ORG Escherichia coli ESBL 08/19/2024 07:05 AM         Electronically signed by Zahida Koenig MD on 8/21/2024 at 10:13 AM

## 2024-08-21 NOTE — PROGRESS NOTES
Patient a/o x4. VSS. Pt having loose watery BMs and diaper is changed as needed. Dooley in place with good output. J-tube clamped and G-tube hooked to drainage bag. Pt got up x2 assist w/ walker to restroom last night and tolerated it well. TPN infusing. Pt tolerated sips of apple juice last night with no nausea. Fall precautions in place.

## 2024-08-21 NOTE — PROGRESS NOTES
Ph: [phone], Fax: [phone]           [facility]               Reason for admission:                 Pain abdomen nausea and vomiting    Brief Summary :     Agustina Fried is being seen by nephrology for CKD       Interval History and plan:     Seen in room  BP is elevated   Urine is 1225 ml + 300 ml of diarrhea    Creatinine improved 2.3 > 1.8 > 1.3  Bicarbonate improved 13 > 22 > 24  SP extensive surgery  (Diagnostic laparoscopy, ex-lap, jose-en-y gastrojejunostomy bypass, feeding tube placement, and extensive lysis of adhesions )    Continue clonidine patch to 0.3 mg q weekly and amlodipine ( cannot take oral pills ). Start IV hydralazine prn for SBP > 170  Keep  Dooley catheter   IVF rate decreased   Potassium improved   GFR is improving with IVF. She was in prerenal failure from massive fluid loss                     Assessment :       CKD Stage IIIa  Creatinine has been variable in the past she has a history of recurrent SHARRON  Last creatinine was 2 upon discharge from 7/2 hospitalization  she has history of diabetes mellitus hypertension    CHF  Echo (7/2/24)    Limited only for LVEF and RVF.    Image quality is adequate.    Left Ventricle: Normal left ventricular systolic function with a visually estimated EF of 55 - 60%. Normal wall motion.    Right ventricle size is normal. Normal systolic function.    Does not appear volume overloaded      Hypertension   BP: (163-169)/(60-68)  Pulse:  [55-56]   BP goal inpatient 130-140 systolic inpatient        [facility] would like to thank Zahida Koenig MD   for opportunity to serve this patient      Please call with questions at-   24 Hrs Answering service [phone] or  7 am- 5 pm via Perfect serve or cell phone  Dr.Muhammad Dayne Trimble MD       HPI :     Agustina Fried is a 64 y.o. female presented to   the hospital on 8/2/2024 with  pain abdomen nausea and vomiting.  She is known to have nausea vomiting diarrhea for several  Medication:     Current Facility-Administered Medications: vancomycin (FIRVANQ) 50 MG/ML oral solution 250 mg, 250 mg, Per G Tube, 4 times per day  sodium chloride 0.9 % 1,000 mL infusion, , IntraVENous, Continuous  PN-Adult Premix 5/20 - Standard Electrolytes - Central Line, , IntraVENous, Continuous TPN  metoclopramide (REGLAN) injection 5 mg, 5 mg, IntraVENous, Q6H  miconazole (MICOTIN) 2 % powder, , Topical, BID  nystatin (MYCOSTATIN) 642293 UNIT/ML suspension 500,000 Units, 5 mL, Oral, 4x Daily  insulin lispro (HUMALOG,ADMELOG) injection vial 0-8 Units, 0-8 Units, SubCUTAneous, Q4H  polycarbophil (FIBERCON) tablet 625 mg, 625 mg, Oral, Daily  [Held by provider] amLODIPine (NORVASC) tablet 5 mg, 5 mg, Oral, Daily  [Held by provider] atorvastatin (LIPITOR) tablet 40 mg, 40 mg, Oral, Nightly  [Held by provider] gabapentin (NEURONTIN) capsule 200 mg, 200 mg, Oral, BID  traZODone (DESYREL) tablet 100 mg, 100 mg, Oral, Nightly  metroNIDAZOLE (FLAGYL) 500 mg in 0.9% NaCl 100 mL IVPB premix, 500 mg, IntraVENous, Q8H  0.9 % sodium chloride infusion, , IntraVENous, PRN  0.9 % sodium chloride infusion, , IntraVENous, PRN  cloNIDine (CATAPRES) 0.3 MG/24HR 1 patch, 1 patch, TransDERmal, Weekly  phenol 1.4 % mouth spray 1 spray, 1 spray, Mouth/Throat, Q2H PRN  pantoprazole (PROTONIX) 40 mg in sodium chloride (PF) 0.9 % 10 mL injection, 40 mg, IntraVENous, Q12H  sodium chloride flush 0.9 % injection 5-40 mL, 5-40 mL, IntraVENous, 2 times per day  sodium chloride flush 0.9 % injection 5-40 mL, 5-40 mL, IntraVENous, PRN  0.9 % sodium chloride infusion, , IntraVENous, PRN  enoxaparin (LOVENOX) injection 40 mg, 40 mg, SubCUTAneous, Daily  thiamine (B-1) injection 100 mg, 100 mg, IntraVENous, Daily  HYDROmorphone (DILAUDID) injection 0.25 mg, 0.25 mg, IntraVENous, Q3H PRN **OR** HYDROmorphone (DILAUDID) injection 0.5 mg, 0.5 mg, IntraVENous, Q3H PRN  sodium chloride flush 0.9 % injection 5-40 mL, 5-40 mL, IntraVENous, 2 times per

## 2024-08-21 NOTE — PLAN OF CARE

## 2024-08-21 NOTE — CARE COORDINATION
Patient admitted from [facility] and she wanted to return but family wanted [facility]. Neither are able to accept while on TPN. Unable to given tube feeds due to placement of j-tube. I spoke with family and they agreed on [facility] at the [facility] location when patient is ready to d/c. They started precert on 8/19 accidentally and patient has been approved. Approval is good until midnight on 8/26. Notified surgery of this as well.     Electronically signed by Elaina Rutherford RN on 8/21/2024 at 10:45 AM  [phone]

## 2024-08-21 NOTE — PROGRESS NOTES
General Surgery  Progress Note      Procedure(s) Performed: Diagnostic laparoscopy, ex-lap, jose-en-y gastrojejunostomy bypass, feeding tube placement, and extensive lysis of adhesions.     Subjective:   Denies nausea or vomiting. Dressing changed on AM rounds. No new complaints.     Objective:    Vitals:   Vitals:    08/20/24 2246 08/21/24 0003 08/21/24 0344 08/21/24 0746   BP: (!) 178/68 (!) 169/60 (!) 166/61 (!) 163/68   Pulse: 58  56 55   Resp:    16   Temp: 98.2 °F (36.8 °C)  98.3 °F (36.8 °C) 98.9 °F (37.2 °C)   TempSrc: Oral  Oral Axillary   SpO2: 97%  98% 98%   Weight:       Height:           Physical Exam:  General appearance: alert, no acute distress  Chest/Lungs: Normal effort with no accessory muscle use on room air   Cardiovascular: RRR,  well perfused  Abdomen: Soft, non-distended, appropriately tender. Midline incision erythematous and tender to palpation. G-tube venting to drainage bag, J-tube to be clamped at all times.   Rectumn: Rectal tube with stool present      Assessment and Plan  This is a 64 y.o. year old female status post diagnostic laparoscopy, ex-lap, jose-en-y gastrojejunostomy bypass, feeding tube placement, and extensive lysis of adhesions secondary to duodenal obstruction. (8/11/24).    - J tube to be clamped. Okay to place medications through G port and clamp for 30 minutes after administration of medications.   -Will reach out to ID regarding ABX coverage plans.   -Nephro following-petersen in place, appreciate recs  - MMPC  - IS ordered to bedside, encourage hourly IS and deep breathing  - Okay for NPO sips of clears/chips, TPN  - PT/OT, OOB,  - Midline packed with 1/4 inch iodoform  covered with 4 x 8s gauze and tape to be changed BID with surgery team doing AM dressing change and bedside RN to do PM dressing change,    Cecily Rojas, General Surgery  08/21/24  8:32 AM

## 2024-08-21 NOTE — PROGRESS NOTES
ID Follow-up NOTE    CC:   Recurrent C diff infection   Antibiotics: Enteral Vanco, IV Metronidazole    Admit Date: 8/2/2024  Hospital Day: 20    Subjective:     POD#9 RADHA, gastroduodenectomy with reanastomosis, J-tube    Advanced to clear liquid diet today  Patient c/o abd pain       Objective:     Patient Vitals for the past 8 hrs:   BP Temp Temp src Pulse Resp SpO2   08/21/24 1159 (!) 140/59 99.1 °F (37.3 °C) Oral 59 -- 97 %   08/21/24 0746 (!) 163/68 98.9 °F (37.2 °C) Axillary 55 16 98 %     I/O last 3 completed shifts:  In: 2678.1 [P.O.:178; I.V.:1600; IV Piggyback:100]  Out: 2785 [Urine:1925; Emesis/NG output:60; Stool:800]  No intake/output data recorded.    EXAM:  GENERAL: No apparent distress.  RA  HEENT: Membranes moist, no oral lesion  NECK:  Supple, no lymphadenopathy  LUNGS: Clear b/l, no rales, no dullness  CARDIAC: RRR, no murmur appreciated  ABD:  Hypoactive BS, soft , diffuse tenderness, G-tube capped, midline wound w staples  EXT:  No rash, no edema, no lesions  NEURO: No focal neurologic findings  PSYCH: Orientation, sensorium, mood normal  LINES:  R PICC, placed 8/4       Data Review:  Lab Results   Component Value Date    WBC 6.5 08/21/2024    HGB 8.3 (L) 08/21/2024    HCT 24.7 (L) 08/21/2024    MCV 90.1 08/21/2024     08/21/2024     Lab Results   Component Value Date    CREATININE 1.3 (H) 08/21/2024    BUN 36 (H) 08/21/2024     08/21/2024    K 4.1 08/21/2024     08/21/2024    CO2 24 08/21/2024       Hepatic Function Panel:   Lab Results   Component Value Date/Time    ALKPHOS 567 08/21/2024 05:50 AM    ALT 9 08/21/2024 05:50 AM    AST 38 08/21/2024 05:50 AM    BILITOT 0.4 08/21/2024 05:50 AM    BILIDIR 0.2 08/21/2024 05:50 AM    IBILI 0.2 08/21/2024 05:50 AM       Micro:   - Stool C difficile toxin B gene detected (8/1/24)  - Urine culture positive for E coli (7/31/24)  Antibiotic Interpretation Microscan     ampicillin Resistant >=32 mcg/mL   ampicillin-sulbactam Sensitive 4  mcg/mL   ceFAZolin Resistant >=64 mcg/mL   cefepime Resistant       cefOXitin Sensitive <=4 mcg/mL   cefTRIAXone Resistant >=64 mcg/mL   cefuroxime Resistant >=64 mcg/mL   ciprofloxacin Resistant >=4 mcg/mL   ertapenem Sensitive <=0.12 mcg/mL   gentamicin Sensitive <=1 mcg/mL   levofloxacin Resistant >=8 mcg/mL   meropenem Sensitive <=0.25 mcg/mL   nitrofurantoin Sensitive <=16 mcg/mL   piperacillin-tazobactam Sensitive <=4 mcg/mL   trimethoprim-sulfamethoxazole Resistant >=320 mcg/mL         Imagin24 CT OF THE ABDOMEN AND PELVIS WITHOUT CONTRAST   IMPRESSION:  Moderate ileus with severe gastric distension.    24 CT OF THE CHEST WITHOUT CONTRAST   Trace bilateral pleural effusions.     Patchy ground-glass opacity within the left lower lobe with superimposed micro nodularity; correlate for pneumonitis.  Metastatic disease cannot be entirely excluded; continued attention on follow-up is suggested.     Atherosclerosis, including coronary artery calcification.     1.5 cm hypodense right thyroid nodule, for which follow-up recommendations are as below.     Mild fibrosis about the periphery of the upper lobes.     Chronic heterogeneous attenuation of thoracic and cervical vertebral bodies, as well as sternum, which could reflect metabolic bone disease or chronic metastatic disease.    24  CT ABDOMEN PELVIS W WO CONTRAST   IMPRESSION:  1.  Interval development of moderate colitis along the left hemicolon which may represent C. difficile colitis.  2.  Reported duodenal mass is not well evaluated with an ill-defined 6 cm area of heterogeneous necrotic mass or abscess along the second portion of the duodenum with moderate duodenal luminal narrowing. This abuts the hepatic flexure of the colon and may represent sequelae of malignancy or infection with possible fistula or abscess formation, not well evaluated. Recommend correlation with EGD.     KUB  Gastrostomy tube in left upper  quadrant.  Nonobstructive bowel gas pattern.    8/18 Renal ultrasound  No acute sonographic abnormality of the left kidney. Prior right nephrectomy..       Scheduled Meds:   vancomycin  250 mg Per G Tube 4 times per day    metoclopramide  5 mg IntraVENous Q6H    miconazole   Topical BID    nystatin  5 mL Oral 4x Daily    insulin lispro  0-8 Units SubCUTAneous Q4H    polycarbophil  625 mg Oral Daily    [Held by provider] amLODIPine  5 mg Oral Daily    [Held by provider] atorvastatin  40 mg Oral Nightly    [Held by provider] gabapentin  200 mg Oral BID    traZODone  100 mg Oral Nightly    metroNIDAZOLE  500 mg IntraVENous Q8H    cloNIDine  1 patch TransDERmal Weekly    pantoprazole (PROTONIX) 40 mg in sodium chloride (PF) 0.9 % 10 mL injection  40 mg IntraVENous Q12H    sodium chloride flush  5-40 mL IntraVENous 2 times per day    enoxaparin  40 mg SubCUTAneous Daily    thiamine  100 mg IntraVENous Daily    sodium chloride flush  5-40 mL IntraVENous 2 times per day       Continuous Infusions:   PN-Adult Premix 5/20 - Standard Electrolytes - Central Line      sodium chloride 0.9 % 1,000 mL infusion 75 mL/hr at 08/21/24 0758    PN-Adult Premix 5/20 - Standard Electrolytes - Central Line 70 mL/hr at 08/20/24 1747    sodium chloride      sodium chloride      sodium chloride 10 mL/hr at 08/17/24 0634    sodium chloride 5 mL/hr at 08/04/24 1851    dextrose         PRN Meds:  hydrALAZINE, sodium chloride, sodium chloride, phenol, sodium chloride flush, sodium chloride, HYDROmorphone **OR** HYDROmorphone, sodium chloride flush, sodium chloride, ondansetron **OR** ondansetron, polyethylene glycol, acetaminophen **OR** acetaminophen, glucose, dextrose bolus **OR** dextrose bolus, glucagon (rDNA), dextrose      Assessment:     Hx psych d/o, breast ca, CAD, R nephrectomy CKD  Hx C diff     Presents with GI sx   Admit 7/30 with SBO  UA tr LE, micro 21-50 WBC  CT / EGD - duodenal mass causing

## 2024-08-22 ENCOUNTER — APPOINTMENT (OUTPATIENT)
Dept: GENERAL RADIOLOGY | Age: 64
End: 2024-08-22
Attending: SURGERY
Payer: MEDICAID

## 2024-08-22 VITALS
OXYGEN SATURATION: 96 % | BODY MASS INDEX: 29.95 KG/M2 | TEMPERATURE: 98.6 F | DIASTOLIC BLOOD PRESSURE: 68 MMHG | HEART RATE: 62 BPM | WEIGHT: 169 LBS | SYSTOLIC BLOOD PRESSURE: 166 MMHG | RESPIRATION RATE: 15 BRPM | HEIGHT: 63 IN

## 2024-08-22 LAB
ALBUMIN SERPL-MCNC: 2 G/DL (ref 3.4–5)
ANION GAP SERPL CALCULATED.3IONS-SCNC: 3 MMOL/L (ref 3–16)
BACTERIA SPEC AEROBE CULT: ABNORMAL
BACTERIA SPEC ANAEROBE CULT: ABNORMAL
BASOPHILS # BLD: 0 K/UL (ref 0–0.2)
BASOPHILS NFR BLD: 0.3 %
BUN SERPL-MCNC: 35 MG/DL (ref 7–20)
CALCIUM SERPL-MCNC: 7.4 MG/DL (ref 8.3–10.6)
CHLORIDE SERPL-SCNC: 112 MMOL/L (ref 99–110)
CO2 SERPL-SCNC: 25 MMOL/L (ref 21–32)
CREAT SERPL-MCNC: 1.2 MG/DL (ref 0.6–1.2)
DEPRECATED RDW RBC AUTO: 15.8 % (ref 12.4–15.4)
EOSINOPHIL # BLD: 0.1 K/UL (ref 0–0.6)
EOSINOPHIL NFR BLD: 1.2 %
GFR SERPLBLD CREATININE-BSD FMLA CKD-EPI: 50 ML/MIN/{1.73_M2}
GLUCOSE BLD-MCNC: 139 MG/DL (ref 70–99)
GLUCOSE BLD-MCNC: 144 MG/DL (ref 70–99)
GLUCOSE BLD-MCNC: 145 MG/DL (ref 70–99)
GLUCOSE BLD-MCNC: 156 MG/DL (ref 70–99)
GLUCOSE SERPL-MCNC: 117 MG/DL (ref 70–99)
GRAM STN SPEC: ABNORMAL
HCT VFR BLD AUTO: 25.8 % (ref 36–48)
HGB BLD-MCNC: 8.5 G/DL (ref 12–16)
LYMPHOCYTES # BLD: 1.2 K/UL (ref 1–5.1)
LYMPHOCYTES NFR BLD: 16.3 %
MAGNESIUM SERPL-MCNC: 1.8 MG/DL (ref 1.8–2.4)
MCH RBC QN AUTO: 29.8 PG (ref 26–34)
MCHC RBC AUTO-ENTMCNC: 32.9 G/DL (ref 31–36)
MCV RBC AUTO: 90.7 FL (ref 80–100)
MONOCYTES # BLD: 0.6 K/UL (ref 0–1.3)
MONOCYTES NFR BLD: 8.7 %
NEUTROPHILS # BLD: 5.4 K/UL (ref 1.7–7.7)
NEUTROPHILS NFR BLD: 73.5 %
ORGANISM: ABNORMAL
PERFORMED ON: ABNORMAL
PHOSPHATE SERPL-MCNC: 3 MG/DL (ref 2.5–4.9)
PLATELET # BLD AUTO: 187 K/UL (ref 135–450)
PMV BLD AUTO: 9.7 FL (ref 5–10.5)
POTASSIUM SERPL-SCNC: 4.5 MMOL/L (ref 3.5–5.1)
RBC # BLD AUTO: 2.84 M/UL (ref 4–5.2)
SODIUM SERPL-SCNC: 140 MMOL/L (ref 136–145)
WBC # BLD AUTO: 7.3 K/UL (ref 4–11)

## 2024-08-22 PROCEDURE — 6360000002 HC RX W HCPCS

## 2024-08-22 PROCEDURE — 99232 SBSQ HOSP IP/OBS MODERATE 35: CPT | Performed by: INTERNAL MEDICINE

## 2024-08-22 PROCEDURE — 2580000003 HC RX 258

## 2024-08-22 PROCEDURE — 6370000000 HC RX 637 (ALT 250 FOR IP): Performed by: INTERNAL MEDICINE

## 2024-08-22 PROCEDURE — 80069 RENAL FUNCTION PANEL: CPT

## 2024-08-22 PROCEDURE — 83735 ASSAY OF MAGNESIUM: CPT

## 2024-08-22 PROCEDURE — 74019 RADEX ABDOMEN 2 VIEWS: CPT

## 2024-08-22 PROCEDURE — 85025 COMPLETE CBC W/AUTO DIFF WBC: CPT

## 2024-08-22 RX ORDER — METOCLOPRAMIDE HYDROCHLORIDE 5 MG/ML
5 INJECTION INTRAMUSCULAR; INTRAVENOUS EVERY 6 HOURS
Qty: 1 EACH | Refills: 0 | Status: SHIPPED | OUTPATIENT
Start: 2024-08-22

## 2024-08-22 RX ORDER — CLONIDINE 0.3 MG/24H
1 PATCH, EXTENDED RELEASE TRANSDERMAL WEEKLY
Qty: 4 PATCH | Refills: 0
Start: 2024-08-23

## 2024-08-22 RX ORDER — NYSTATIN 100000/ML
5 SUSPENSION, ORAL (FINAL DOSE FORM) ORAL 4 TIMES DAILY
Qty: 1 EACH | Refills: 0
Start: 2024-08-22

## 2024-08-22 RX ORDER — VANCOMYCIN HYDROCHLORIDE 50 MG/ML
250 KIT ORAL EVERY 12 HOURS SCHEDULED
Qty: 60 ML | Refills: 0
Start: 2024-08-22 | End: 2024-08-28

## 2024-08-22 RX ADMIN — METOCLOPRAMIDE HYDROCHLORIDE 5 MG: 5 INJECTION INTRAMUSCULAR; INTRAVENOUS at 08:36

## 2024-08-22 RX ADMIN — Medication 500000 UNITS: at 09:22

## 2024-08-22 RX ADMIN — HYDROMORPHONE HYDROCHLORIDE 0.5 MG: 1 INJECTION, SOLUTION INTRAMUSCULAR; INTRAVENOUS; SUBCUTANEOUS at 00:33

## 2024-08-22 RX ADMIN — SODIUM CHLORIDE, PRESERVATIVE FREE 40 MG: 5 INJECTION INTRAVENOUS at 08:34

## 2024-08-22 RX ADMIN — Medication 500000 UNITS: at 12:02

## 2024-08-22 RX ADMIN — SODIUM CHLORIDE, PRESERVATIVE FREE 10 ML: 5 INJECTION INTRAVENOUS at 08:45

## 2024-08-22 RX ADMIN — METOCLOPRAMIDE HYDROCHLORIDE 5 MG: 5 INJECTION INTRAMUSCULAR; INTRAVENOUS at 04:03

## 2024-08-22 RX ADMIN — ENOXAPARIN SODIUM 40 MG: 100 INJECTION SUBCUTANEOUS at 08:44

## 2024-08-22 RX ADMIN — MICONAZOLE NITRATE: 20 POWDER TOPICAL at 08:42

## 2024-08-22 RX ADMIN — CALCIUM POLYCARBOPHIL 625 MG: 625 TABLET ORAL at 08:46

## 2024-08-22 RX ADMIN — VANCOMYCIN HYDROCHLORIDE 250 MG: 250 POWDER, FOR SOLUTION ORAL at 05:58

## 2024-08-22 RX ADMIN — THIAMINE HYDROCHLORIDE 100 MG: 100 INJECTION, SOLUTION INTRAMUSCULAR; INTRAVENOUS at 08:40

## 2024-08-22 RX ADMIN — VANCOMYCIN HYDROCHLORIDE 250 MG: 250 POWDER, FOR SOLUTION ORAL at 00:30

## 2024-08-22 RX ADMIN — HYDROMORPHONE HYDROCHLORIDE 0.5 MG: 1 INJECTION, SOLUTION INTRAMUSCULAR; INTRAVENOUS; SUBCUTANEOUS at 05:58

## 2024-08-22 RX ADMIN — VANCOMYCIN HYDROCHLORIDE 250 MG: 250 POWDER, FOR SOLUTION ORAL at 12:01

## 2024-08-22 ASSESSMENT — PAIN SCALES - WONG BAKER
WONGBAKER_NUMERICALRESPONSE: NO HURT
WONGBAKER_NUMERICALRESPONSE: HURTS A LITTLE BIT
WONGBAKER_NUMERICALRESPONSE: NO HURT
WONGBAKER_NUMERICALRESPONSE: HURTS A LITTLE BIT
WONGBAKER_NUMERICALRESPONSE: NO HURT

## 2024-08-22 ASSESSMENT — PAIN SCALES - GENERAL
PAINLEVEL_OUTOF10: 0
PAINLEVEL_OUTOF10: 2
PAINLEVEL_OUTOF10: 9
PAINLEVEL_OUTOF10: 2
PAINLEVEL_OUTOF10: 2
PAINLEVEL_OUTOF10: 9
PAINLEVEL_OUTOF10: 2

## 2024-08-22 ASSESSMENT — PAIN DESCRIPTION - FREQUENCY
FREQUENCY: CONTINUOUS

## 2024-08-22 ASSESSMENT — PAIN DESCRIPTION - PAIN TYPE
TYPE: SURGICAL PAIN

## 2024-08-22 ASSESSMENT — PAIN DESCRIPTION - DESCRIPTORS
DESCRIPTORS: ACHING

## 2024-08-22 ASSESSMENT — PAIN DESCRIPTION - ORIENTATION
ORIENTATION: MID

## 2024-08-22 ASSESSMENT — PAIN DESCRIPTION - ONSET
ONSET: ON-GOING

## 2024-08-22 ASSESSMENT — PAIN DESCRIPTION - LOCATION
LOCATION: ABDOMEN

## 2024-08-22 NOTE — PLAN OF CARE
Problem: Safety - Adult  Goal: Free from fall injury  8/22/2024 1357 by Britt Rodriguez RN  Outcome: Adequate for Discharge  8/22/2024 1130 by Britt Rodriguez RN  Outcome: Progressing     Problem: Chronic Conditions and Co-morbidities  Goal: Patient's chronic conditions and co-morbidity symptoms are monitored and maintained or improved  8/22/2024 1357 by Britt Rodriguez RN  Outcome: Adequate for Discharge  8/22/2024 1130 by Britt Rodriguez RN  Outcome: Progressing     Problem: Discharge Planning  Goal: Discharge to home or other facility with appropriate resources  8/22/2024 1357 by Britt Rodriguez RN  Outcome: Adequate for Discharge  8/22/2024 1130 by Britt Rodriguez RN  Outcome: Progressing     Problem: ABCDS Injury Assessment  Goal: Absence of physical injury  8/22/2024 1357 by Britt Rodriguez RN  Outcome: Adequate for Discharge  8/22/2024 1130 by Britt Rodriguez RN  Outcome: Progressing     Problem: Nutrition Deficit:  Goal: Optimize nutritional status  8/22/2024 1357 by Britt Rodriguez RN  Outcome: Adequate for Discharge  8/22/2024 1130 by Britt Rodriguez RN  Outcome: Progressing     Problem: Skin/Tissue Integrity  Goal: Absence of new skin breakdown  Description: 1.  Monitor for areas of redness and/or skin breakdown  2.  Assess vascular access sites hourly  3.  Every 4-6 hours minimum:  Change oxygen saturation probe site  4.  Every 4-6 hours:  If on nasal continuous positive airway pressure, respiratory therapy assess nares and determine need for appliance change or resting period.  8/22/2024 1357 by Britt Rodriguez RN  Outcome: Adequate for Discharge  8/22/2024 1130 by Britt Rodriguez RN  Outcome: Progressing     Problem: Pain  Goal: Verbalizes/displays adequate comfort level or baseline comfort level  8/22/2024 1357 by Britt Rodriguez RN  Outcome: Adequate for Discharge  8/22/2024 1130 by Britt Rodriguez RN  Outcome: Progressing

## 2024-08-22 NOTE — PROGRESS NOTES
General Surgery  Progress Note      Procedure(s) Performed: Diagnostic laparoscopy, ex-lap, jose-en-y gastrojejunostomy bypass, feeding tube placement, and extensive lysis of adhesions.     Subjective:   Denies nausea or vomiting. Dressing changed on AM rounds. Tolerated CLD. Patient reports pain.     Objective:    Vitals:   Vitals:    08/21/24 1938 08/22/24 0033 08/22/24 0408 08/22/24 0558   BP: (!) 165/65 (!) 167/66 (!) 149/56    Pulse: 61 65 61    Resp:  18 17 18   Temp: 98.1 °F (36.7 °C) 98.6 °F (37 °C) 98.6 °F (37 °C)    TempSrc: Oral Oral Oral    SpO2: 98% 96% 95%    Weight:       Height:           Physical Exam:  General appearance: alert, no acute distress  Chest/Lungs: Normal effort with no accessory muscle use on room air   Cardiovascular: RRR,  well perfused  Abdomen: Soft, non-distended, appropriately tender. Midline incision tender to palpation with minimal serosanguinous drainage. G-tube clamped, J-tube to be clamped at all times.   Rectumn: Rectal tube with stool present      Assessment and Plan  This is a 64 y.o. year old female status post diagnostic laparoscopy, ex-lap, jose-en-y gastrojejunostomy bypass, feeding tube placement, and extensive lysis of adhesions secondary to duodenal obstruction. (8/11/24).    - J tube to be clamped. Okay to place medications through G port and clamp for 30 minutes after administration of medications.   - Appreciate ID recs. Okay for enteral vanc  - Okay to give meds PO  - Nephro following-petersen in place, appreciate recs  - MMPC  - IS ordered to bedside, encourage hourly IS and deep breathing  - Continue CLD for comfort, TPN  - PT/OT, OOB  - Midline packed with 1/4 inch iodoform  covered with 4 x 8s gauze and tape to be changed BID with surgery team doing AM dressing change and bedside RN to do PM dressing change,    Cindy Owusu MD  PGY1, General Surgery  08/22/24  7:00 AM  [phone]

## 2024-08-22 NOTE — PROGRESS NOTES
V2.0    [facility] [place]      Name:  Agustina Fried /Age/Sex: 1960  (64 y.o. female)   MRN & CSN:  [id number] & [id number] Encounter Date/Time: 2024 9:46 AM EDT   Location:  5319/5319-01 PCP: Viridiana Blanco APRN - NP     Attending:Zahida Koenig MD       Hospital Day: 21    Assessment and Recommendations   Agustina Fried is a 64 y.o. female with pmh of CVA, depression, anxiety, diabetes, hypertension, heart failure, CHF, renal, breast cancer, colon cancer who presents with nausea vomiting and rectal bleeding.  CT showed severe gastric distention.  She underwent a EGD which showed duodenal mass resulting in obstruction.  Patient was transferred to [facility] for surgery oncology input.  Patient underwent diagnostic laparoscopy with exploratory laparotomy gastrojejunal bypass with feeding tube placement and lysis of additions on 2024.  Patient has a G-tube to gravity and had a j-tube for tube feeding.  J-tube placement is dislodged and cannot be used for tube feeding .  Patient is on TPN at present.  Discussed with surgery patient can take some clears but diet cannot be advanced at present.  Patient will need to follow-up with surgery as an outpatient, unclear dose for comfort.  There is some concern of gastroparesis since patient is on Reglan.  Patient also has C. difficile colitis with worsening diarrhea was treated with IV Flagyl and vancomycin enema, now switched to p.o. vancomycin through the G-tube.  To finish 6 more days.  Hospital course was complicated by SHARRON and acidosis which has improved.  Overall this is poor.  Patient is also not a candidate for chemotherapy per oncology.  Palliative care is consulted.  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  Outpatient follow-up with surgery  -s/p diagnostic laparoscopy with exploratory  08/22/24  0601    139 140   K 3.9 4.1 4.5    110 112*   CO2 29 24 25   BUN 44* 36* 35*   CREATININE 1.5* 1.3* 1.2   GLUCOSE 160* 186* 117*     Hepatic:   Recent Labs     08/21/24  0550   AST 38*   ALT 9*   BILITOT 0.4   ALKPHOS 567*     Lipids:   Lab Results   Component Value Date/Time    CHOL 154 11/27/2023 07:00 PM    HDL 75 11/27/2023 07:00 PM    TRIG 34 08/18/2024 04:42 AM     Hemoglobin A1C:   Lab Results   Component Value Date/Time    LABA1C 5.6 08/17/2024 05:00 AM     TSH:   Lab Results   Component Value Date/Time    TSH 0.86 12/11/2022 04:45 AM     Troponin: No results found for: \"TROPONINT\"  Lactic Acid: No results for input(s): \"LACTA\" in the last 72 hours.  BNP: No results for input(s): \"PROBNP\" in the last 72 hours.  UA:  Lab Results   Component Value Date/Time    NITRU Negative 08/16/2024 04:35 PM    COLORU Yellow 08/16/2024 04:35 PM    PHUR 6.0 08/16/2024 04:35 PM    PHUR 5.5 04/18/2024 04:40 PM    WBCUA 21-50 08/16/2024 04:35 PM    RBCUA 21-50 08/16/2024 04:35 PM    YEAST Present 08/16/2024 04:35 PM    BACTERIA 1+ 08/16/2024 04:35 PM    CLARITYU CLOUDY 08/16/2024 04:35 PM    SPECGRAV 1.010 01/06/2013 12:11 AM    LEUKOCYTESUR MODERATE 08/16/2024 04:35 PM    UROBILINOGEN 0.2 08/16/2024 04:35 PM    BILIRUBINUR Negative 08/16/2024 04:35 PM    BLOODU MODERATE 08/16/2024 04:35 PM    GLUCOSEU Negative 08/16/2024 04:35 PM    GLUCOSEU >=1000 mg/dL 06/07/2010 03:38 PM    KETUA Negative 08/16/2024 04:35 PM    AMORPHOUS 3+ 04/18/2024 04:40 PM     Urine Cultures:   Lab Results   Component Value Date/Time    LABURIN  07/31/2024 04:34 AM     >100,000 CFU/ml  CONTACT PRECAUTIONS INDICATED  Carbapenem antibiotics are the best option for infections caused  by ESBL producing organisms.  Other antibiotic classes are  likely to result in treatment failure, even for organisms  demonstrating in vitro susceptibility.       Blood Cultures:   Lab Results   Component Value Date/Time    BC No Growth after 4 days of

## 2024-08-22 NOTE — PROGRESS NOTES
ID Follow-up NOTE    CC:   Recurrent C diff infection   Antibiotics: Enteral Vancomycin    Admit Date: 8/2/2024  Hospital Day: 21    Subjective:     POD#10 RADHA, gastroduodenectomy with reanastomosis, J-tube    Advanced to clear liquid diet 8/21  Patient reports LESS abd pain      Objective:     Patient Vitals for the past 8 hrs:   BP Temp Temp src Pulse Resp SpO2   08/22/24 0750 (!) 166/55 98.6 °F (37 °C) Oral 59 15 96 %   08/22/24 0558 -- -- -- -- 18 --   08/22/24 0408 (!) 149/56 98.6 °F (37 °C) Oral 61 17 95 %     I/O last 3 completed shifts:  In: 1668 [P.O.:180; I.V.:768]  Out: 1800 [Urine:1775; Emesis/NG output:25]  No intake/output data recorded.    EXAM:  GENERAL: No apparent distress.  RA  HEENT: Membranes moist, no oral lesion  NECK:  Supple, no lymphadenopathy  LUNGS: Clear b/l, no rales, no dullness  CARDIAC: RRR, no murmur appreciated  ABD:  Hypoactive BS, soft , diffuse tenderness, G-tube capped  Midline wound w staples, no purulence, no surrounding redness / induration  EXT:  No rash, no edema, no lesions  NEURO: No focal neurologic findings  PSYCH: Orientation, sensorium, mood normal  LINES:  R PICC, placed 8/4       Data Review:  Lab Results   Component Value Date    WBC 7.3 08/22/2024    HGB 8.5 (L) 08/22/2024    HCT 25.8 (L) 08/22/2024    MCV 90.7 08/22/2024     08/22/2024     Lab Results   Component Value Date    CREATININE 1.2 08/22/2024    BUN 35 (H) 08/22/2024     08/22/2024    K 4.5 08/22/2024     (H) 08/22/2024    CO2 25 08/22/2024       Hepatic Function Panel:   Lab Results   Component Value Date/Time    ALKPHOS 567 08/21/2024 05:50 AM    ALT 9 08/21/2024 05:50 AM    AST 38 08/21/2024 05:50 AM    BILITOT 0.4 08/21/2024 05:50 AM    BILIDIR 0.2 08/21/2024 05:50 AM    IBILI 0.2 08/21/2024 05:50 AM       Micro:   - Stool C difficile toxin B gene detected (8/1/24)  - Urine culture positive for E coli (7/31/24)  Antibiotic Interpretation Microscan     ampicillin Resistant >=32  correlation with EGD.    8/17 KUB  Gastrostomy tube in left upper quadrant.  Nonobstructive bowel gas pattern.    8/18 Renal ultrasound  No acute sonographic abnormality of the left kidney. Prior right nephrectomy..       Scheduled Meds:   vancomycin  250 mg Per G Tube 4 times per day    metoclopramide  5 mg IntraVENous Q6H    miconazole   Topical BID    nystatin  5 mL Oral 4x Daily    insulin lispro  0-8 Units SubCUTAneous Q4H    polycarbophil  625 mg Oral Daily    [Held by provider] amLODIPine  5 mg Oral Daily    [Held by provider] atorvastatin  40 mg Oral Nightly    [Held by provider] gabapentin  200 mg Oral BID    traZODone  100 mg Oral Nightly    cloNIDine  1 patch TransDERmal Weekly    pantoprazole (PROTONIX) 40 mg in sodium chloride (PF) 0.9 % 10 mL injection  40 mg IntraVENous Q12H    sodium chloride flush  5-40 mL IntraVENous 2 times per day    enoxaparin  40 mg SubCUTAneous Daily    thiamine  100 mg IntraVENous Daily    sodium chloride flush  5-40 mL IntraVENous 2 times per day       Continuous Infusions:   PN-Adult Premix 5/20 - Standard Electrolytes - Central Line 70 mL/hr at 08/21/24 1739    sodium chloride 0.9 % 1,000 mL infusion 50 mL/hr at 08/22/24 0911    sodium chloride      sodium chloride      sodium chloride 10 mL/hr at 08/17/24 0634    sodium chloride 5 mL/hr at 08/04/24 1851    dextrose         PRN Meds:  hydrALAZINE, sodium chloride, sodium chloride, phenol, sodium chloride flush, sodium chloride, HYDROmorphone **OR** HYDROmorphone, sodium chloride flush, sodium chloride, ondansetron **OR** ondansetron, polyethylene glycol, acetaminophen **OR** acetaminophen, glucose, dextrose bolus **OR** dextrose bolus, glucagon (rDNA), dextrose      Assessment:     Hx psych d/o, breast ca, CAD, R nephrectomy CKD  Hx C diff     Presents with GI sx   Admit 7/30 with SBO  UA tr LE, micro 21-50 WBC  CT / EGD - duodenal mass causing obstruction    OR 8/12 resection tumor (gastroduodenectomy with reanastomosis,  J-tube)   - multinucleated giant cell reaction, no malignancy     IMP/  UTI vs bacteriuria      C diff + by PCR,    + diarrhea   + colitis on CT, extending to rectosigmoid colon - reviewed w Radiologist, Dr Robison     Cr 1.3  Last WBC 6.5  Alk phos 567    Plan:     Cont enteral Vancomycin - no po / oral, treat for additional 7 days    No other antimicrobial, important to avoid other antibiotic use as possible   Would NOT given any therapy for E faecium / VRE and E coli isolated in abd wound      Postop care per Surg    Medical Decision Making:  The following items were considered in medical decision making:  Discussion of patient care with other providers  Reviewed clinical lab tests  Reviewed radiology tests  Reviewed other diagnostic tests/interventions  Independent review of radiologic images last week with Radiologist   Microbiology cultures and other micro tests reviewed      Discussed with pt, Attending - Dr Arya Payton MD

## 2024-08-22 NOTE — CARE COORDINATION
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]   Date / Time: 8/2/2024 10:45 PM    Current PCP: Viridiana Blanco APRN - NP  Clinic patient: No    Hospitalization in the last 30 days: Yes  Readmission Assessment  Number of Days since last admission?: 1-7 days  Previous Disposition: SNF  Who is being Interviewed: Patient  What was the patient's/caregiver's perception as to why they think they needed to return back to the hospital?: Other (Comment) (Patient transferred from [facility])  Did you visit your Primary Care Physician after you left the hospital, before you returned this time?: No  Why weren't you able to visit your PCP?: Did not have an appointment  Did you see a specialist, such as Cardiac, Pulmonary, Orthopedic Physician, etc. after you left the hospital?: No  Who advised the patient to return to the hospital?: Physician  Does the patient report anything that got in the way of taking their medications?: No  In our efforts to provide the best possible care to you and others like you, can you think of anything that we could have done to help you after you left the hospital the first time, so that you might not have needed to return so soon?: Discharge instructions that are concise, clear, and non contradictory, Teach back instructions regarding management of illness, Education on how to continue taking medications upon discharge    Advance Directives:  Code Status: Full Code  [place] DNR form completed and on chart: No    Financial:  Payor: [employer] [place] MEDICAID / Plan: [facility] MEDICA / Product Type: *No Product type* /      Pharmacy:    [facility] [place] - [id number] [place] - [address] [phone] - F  [phone]  [address]  [place] [address]  Phone: [phone] Fax: [phone]    [id number] [place] [place] - [address] - P [phone] - F [phone]  [address]  [facility] [address]  Phone: [phone] Fax: [phone]      Assistance purchasing medications?: Potential Assistance Purchasing Medications: No  Assistance provided by Case Management: None at this time    Does patient want to participate in local refill/ meds to beds program?:      Meds To Beds General Rules:  1. Can ONLY be done Monday- Friday between 8:30am-5pm  2. Prescription(s) must be in pharmacy by 3pm to be filled same day  3.Copy of patient's insurance/ prescription drug card and patient face sheet must be sent along with the prescription(s)  4. Cost of Rx cannot be added to hospital bill. If financial assistance is needed, please contact unit  or ;  or  CANNOT provide pharmacy voucher for patients co-pays  5. Patients can then  the prescription on their way out of the hospital at discharge, or pharmacy can deliver to the bedside if staff is available. (payment due at time of pick-up or delivery - cash, check, or card accepted)     Able to afford home medications/ co-pay costs: Yes    ADLS:  Current PT AM-PAC Score: 17 /24  Current OT AM-PAC Score: 14 /24    DISCHARGE [facility]position: [facility] Phone: [phone] Fax: [phone]    LOC at discharge: Long Term Acute Care  CHINO Completed: Yes    Notification completed in HENS/PAS?:  No    IMM Completed:   No         Transportation:  Transportation PLAN for discharge: EMS transportation   Mode of Transport: Ambulance stretcher - BLS  Reason for medical transport: Severe muscular weakness and de-conditioned state due to prolonged hospital stay and requires ambulance transport due to impulsive at times  Name of

## 2024-08-22 NOTE — PROGRESS NOTES
[facility] -  Clinical Pharmacy Note    Parenteral Nutrition - Management by Pharmacy    Consult Date(s): 8/4/24 and 8/17/24 (discontinued / reinitiated same day 8/17/24)  Consulting Provider(s): Dr Butler / Dr Fairbanks    Assessment / Plan  1)  Gastric outlet obstruction 2/2 ulcerated mass, ileus - Parenteral Nutrition  Day of Therapy:19  Formulation:  Clinimix-E 5/20    Clinimix Rate:  70 ml/hr (Total volume = 1680 ml/day) - at goal rate  Do not recommend changing to cyclic TPN while attempting to advance TF.  Would only recommend cyclic TPN if planning for long term TPN as outpatient (cyclic TPN is NOT for weaning).   Lipids:  20% 250mL over 12 hours on Mon & Thurs only (due to national shortage)  Not currently on TF / have been on hold all week.  Will continue with lipids for now.  Electrolytes:   Clinimix-E includes standard electrolyte formulation. Recommend further repletion with supplemental IVPBs as needed.   Maintenance IVF:  NS @ 50 mL/hr (managed by nephro)  Glucose control:    Pt has h/o DM.  Takes Lantus 8 units daily + Humalog 4 units TID with meals at home.  Glucoses ranged  117-172 since TPN hung last evening.  Used 0 units SSI since TPN hung last evening.  Will continue Regular insulin 40 units / day (48 units added to TPN bag to deliver 40 units / 24 hrs) in TPN bag.  Will monitor glucoses and adjust insulin in TPN bag as needed.     Add MVI 10 mL/day in PN on Mon-Wed-Fri only (due to national shortage) & Trace Elements 1 mL/day in PN daily.  Daily renal panel, daily magnesium, and weekly triglycerides ordered per protocol.  PN will be re-ordered daily.    Please call with questions--  Thanks--  Valorie Tam, PharmD, BCPS, BCGP  [phone] ([facility])   8/22/2024 9:52 AM      Interval update:   TF remain on hold; started clear liquid diet.  Continues on TPN at continuous rate of 70 mL/hr.  Abdomen culture from 8/19/24 now growing VRE and ESBL E.coli - ID recommends no antibiotics for these  organisms currently.    Subjective/Objective:   Agustina Fried is a 64 y.o. 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  PICC placed and TPN started 8/4/24.  Pt is s/p Dx lap, ex lap, Viviana-en-Y gastrojejunostomy byapss, feeding tube placement, and extensive RADHA (done 8/12).      Pharmacy is consulted to manage parenteral nutrition.    Ht Readings from Last 1 Encounters:   08/02/24 1.6 m (5' 2.99\")     Wt Readings from Last 1 Encounters:   08/18/24 76.7 kg (169 lb)     Recent Labs     08/21/24  0530 08/21/24  0550 08/22/24  0601   NA  --  139 140   K  --  4.1 4.5   CL  --  110 112*   CO2  --  24 25   PHOS  --  3.0 3.0   GLUCOSE  --  186* 117*   BUN  --  36* 35*   CREATININE  --  1.3* 1.2   CALCIUM  --  7.0* 7.4*   MG 1.90  --  1.80     Albumin   Date Value Ref Range Status   08/22/2024 2.0 (L) 3.4 - 5.0 g/dL Final     Corrected Calcium: 9.0 mg/dL    Recent Labs     08/21/24 2024 08/22/24  0035 08/22/24  0410 08/22/24  0821   POCGLU 172* 144* 156* 145*     Sliding scale insulin used since PN bag hung yesterday: 0 units    Recent Labs     08/21/24  0550 08/22/24  0601   WBC 6.5 7.3   HGB 8.3* 8.5*    187     Triglycerides   Date Value Ref Range Status   08/18/2024 34 0 - 150 mg/dL Final   08/15/2024 36 0 - 150 mg/dL Final   08/11/2024 20 0 - 150 mg/dL Final   08/06/2024 88 0 - 150 mg/dL Final   08/05/2024 91 0 - 150 mg/dL Final

## 2024-08-22 NOTE — DISCHARGE SUMMARY
V2.0  Discharge Summary    Name:  Agustina Fried /Age/Sex: 1960 (64 y.o. female)   Admit Date: 2024  Discharge Date: 24    MRN & CSN:  [id number] & [id number] Encounter Date and Time 24 1:02 PM EDT    Attending:  Zahida Koenig MD Discharging Provider: Zahida Koenig MD       Hospital Course:   Agustina Fried is a 64 y.o. female with pmh of CVA, depression, anxiety, diabetes, hypertension, heart failure, CHF, renal, breast cancer, colon cancer who presents with nausea vomiting and rectal bleeding.  CT showed severe gastric distention.  She underwent a EGD which showed duodenal mass resulting in obstruction.  Patient was transferred to [facility] for surgery oncology input.  Patient underwent diagnostic laparoscopy with exploratory laparotomy gastrojejunal bypass with feeding tube placement and lysis of additions on 2024.  Patient has a G-tube to gravity and had a j-tube for tube feeding.  J-tube placement is dislodged and cannot be used for tube feeding .  Patient is on TPN at present.  Discussed with surgery patient can take some clears but diet cannot be advanced at present.  Patient will need to follow-up with surgery as an outpatient, unclear dose for comfort.  There is some concern of gastroparesis since patient is on Reglan.  Patient also has C. difficile colitis with worsening diarrhea was treated with IV Flagyl and vancomycin enema, now switched to p.o. vancomycin through the G-tube.  To finish 6 more days.  Hospital course was complicated by SHARRON and acidosis which has improved.  Overall this is poor.  Patient is also not a candidate for chemotherapy per oncology.  Palliative care is consulted.  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  Outpatient follow-up with surgery  -s/p diagnostic laparoscopy with exploratory  into the skin 3 times daily (before meals)     traZODone 100 MG tablet  Commonly known as: DESYREL  Take 1 tablet by mouth nightly            STOP taking these medications      amLODIPine 5 MG tablet  Commonly known as: NORVASC     atorvastatin 40 MG tablet  Commonly known as: LIPITOR     Austedo 9 MG tablet  Generic drug: Deutetrabenazine     calcium carbonate-vitamin D 600-400 MG-UNIT Tabs per tab  Commonly known as: CALTRATE     gabapentin 100 MG capsule  Commonly known as: NEURONTIN     glycerin 2 g suppository     insulin glargine 100 UNIT/ML injection vial  Commonly known as: LANTUS     loperamide 2 MG capsule  Commonly known as: IMODIUM     magnesium hydroxide 400 MG/5ML suspension  Commonly known as: MILK OF MAGNESIA     paliperidone 9 MG extended release tablet  Commonly known as: INVEGA     pantoprazole 40 MG tablet  Commonly known as: PROTONIX     prazosin 2 MG capsule  Commonly known as: MINIPRESS     sodium phosphate 7-19 GM/118ML     torsemide 10 MG tablet  Commonly known as: DEMADEX               Where to Get Your Medications        These medications were sent to [facility] [place] - [id number] [place] - [address] [facility] [phone] - F [phone]  [address] [place] [address]      Phone: [phone]   metoclopramide 5 MG/ML injection       Information about where to get these medications is not yet available    Ask your nurse or doctor about these medications  cloNIDine 0.3 MG/24HR Ptwk  HYDROmorphone 1 MG/ML injection  nystatin 834637 UNIT/ML suspension  pantoprazole 4 mg/mL in sodium chloride (PF) injection  vancomycin 50 MG/ML Solr oral solution        Objective Findings at Discharge:   BP (!) 166/68   Pulse 62   Temp 98.6 °F (37 °C) (Oral)   Resp 15   Ht 1.6 m (5' 2.99\")   Wt 76.7 kg (169 lb)   SpO2 96%   BMI 29.94 kg/m²       Physical Exam:     General: Ill-looking  Eyes: EOMI  ENT: neck supple  Cardiovascular: Regular rate.  Respiratory: Clear to  auscultation  Gastrointestinal: Soft, non tender, tube, G-tube present  Genitourinary: no suprapubic tenderness  Musculoskeletal: No edema  Skin: Midline dressing present  Neuro: Alert.  Psych: Mood appropriate.         Labs and Imaging   US RENAL COMPLETE    Result Date: 8/18/2024  EXAM: US RENAL COMPLETE INDICATION: Progressive chronic kidney disease/acute kidney injury COMPARISON: 4/18/2024 TECHNIQUE: Grayscale and color Doppler imaging acquisition was performed for evaluation of the kidneys and urinary bladder. FINDINGS: The right kidney is surgically absent. The left kidney measures 12.8 cm in size. Normal echogenicity of the left kidney with no hydronephrosis, nephrolithiasis, or focal lesion. Evaluation of the bladder is limited, but is grossly unremarkable.     No acute sonographic abnormality of the left kidney. Prior right nephrectomy.. Electronically signed by Ac Navarro MD    XR ABDOMEN (KUB) (SINGLE AP VIEW)    Result Date: 8/17/2024  EXAM: ABDOMEN X-RAY, 1 VIEW, time INDICATION: vomiting s/p GJ tube placement on tube feeds that were advanced today, COMPARISON: 8/15/2024. TECHNIQUE: FINDINGS: Gastrostomy tube in left upper quadrant. Nonspecific nonobstructive bowel gas pattern.     Gastrostomy tube in left upper quadrant. Nonobstructive bowel gas pattern. Electronically signed by Reyes Urbano MD    XR ABDOMEN (KUB) (SINGLE AP VIEW)    Result Date: 8/15/2024  Examination: XR ABDOMEN (KUB) (SINGLE AP VIEW) Exam time: 8/15/2024 16:12 EDT Clinical history: Concern for toxic megacolon Comparison: 13 August 2024 Technique: Single supine view of the abdomen were obtained.XR ABDOMEN (KUB) (SINGLE AP VIEW) Findings: PEG tube unchanged body of the stomach. Air within the stomach no distention. Midline surgical staples unchanged. Large or small bowel distention. Degenerative changes lumbar spine. .     Stable appearance of the abdomen. Electronically signed by Taras Masters      CBC:   Recent Labs

## 2024-08-22 NOTE — CARE COORDINATION
Patient admitted from [facility] but cannot return there and cannot go to [facility] (son's preference) as she is on TPN. [facility]. Family would like [facility] location. Approval is good until midnight on 8/26.     Patient started on clears.   Electronically signed by Elaina Rutherford RN on 8/22/2024 at 9:27 AM  [phone]

## 2024-08-22 NOTE — PROGRESS NOTES
Patient alert and oriented x3. Patient denies any nausea. Patient complains abd pain and managed with dilaudid. Administrating TPN and iv fluid per order. Assessment done.see flowsheet. Patient in bed lowest position call light and bedside table within reach. All needs are met at this time. Patient aware to call if any help needed.Plan of care ongoing. BP (!) 149/56   Pulse 61   Temp 98.6 °F (37 °C) (Oral)   Resp 18   Ht 1.6 m (5' 2.99\")   Wt 76.7 kg (169 lb)   SpO2 95%   BMI 29.94 kg/m²

## 2024-08-22 NOTE — DISCHARGE INSTR - COC
Continuity of Care Form    Patient Name: Agustina Fried   :  1960  MRN:  [id number]    Admit date:  2024  Discharge date:  2024    Code Status Order: Full Code   Advance Directives:   Advance Care Flowsheet Documentation        Date/Time Healthcare Directive Type of Healthcare Directive Copy in Chart Healthcare Agent Appointed Healthcare Agent's Name Healthcare Agent's Phone Number    24 1317 Unknown, patient unable to respond due to medical condition  --  --  --  --  --     24 1402 Yes, patient has an advance directive for healthcare treatment  Durable power of  for health care  Yes, copy in chart  Adult Fredo Fried  [phone]                     Admitting Physician:  Zahida Koenig MD  PCP: Viridiana Blanco APRN - NP    Discharging Nurse: Ira  Discharging Hospital Unit/Room#: 5319/5319-01  Discharging Unit Phone Number: [phone]    Emergency Contact:   Extended Emergency Contact Information  Primary Emergency Contact: Maurice Fried   [facility]  Home Phone: [phone]  Relation: Child   needed? No  Secondary Emergency Contact: PEMA DIANE  Home Phone: [phone]  Work Phone: [phone]  Mobile Phone: [phone]  Relation: Other  Preferred language: English   needed? No    Past Surgical History:  Past Surgical History:   Procedure Laterality Date    BREAST LUMPECTOMY      Bilateral:breast cancer    CARDIAC CATHETERIZATION  2020    Dr. Marcano([facility]), DAYANA to Diag 1    COLONOSCOPY N/A 2021    COLONOSCOPY DIAGNOSTIC performed by Silviano Pro MD at [facility] ENDOSCOPY    COLONOSCOPY N/A 2023    COLONOSCOPY WITH BIOPSY performed by Silviano Pro MD at [facility] ENDOSCOPY    COLONOSCOPY N/A 2024    COLONOSCOPY DIAGNOSTIC performed by Damian Riley DO at [facility] ENDOSCOPY    COLONOSCOPY N/A 2024    COLONOSCOPY DIAGNOSTIC performed by Guzman Guaman MD at [facility] ENDOSCOPY    CT  °C) (Oral)   Resp 18   Ht 1.6 m (5' 2.99\")   Wt 76.7 kg (169 lb)   SpO2 95%   BMI 29.94 kg/m²     Last documented pain score (0-10 scale): Pain Level: 9  Last Weight:   Wt Readings from Last 1 Encounters:   08/18/24 76.7 kg (169 lb)     Mental Status:  oriented and alert but can be forgetful at times    IV Access:  - PICC - site  R Upper Arm, insertion date: 08/04/2024    Nursing Mobility/ADLs:  Walking   Assisted  Transfer  Assisted  Bathing  Assisted  Dressing  Assisted  Toileting  Assisted  Feeding  Assisted  Med Admin  Assisted  Med Delivery   crushed    Wound Care Documentation and Therapy:  Wound Coccyx Mid Stage 1 Pressure Injury (Active)   Wound Etiology Pressure Stage 1 08/21/24 1544   Dressing Status Clean;Dry;Intact 08/21/24 0344   Wound Cleansed Soap and water 08/21/24 1544   Dressing/Treatment Open to air 08/21/24 1544   Wound Assessment Non-blanchable erythema 08/21/24 1544   Drainage Amount None (dry) 08/21/24 1544   Odor None 08/21/24 1544   Adrienne-wound Assessment Fragile 08/08/24 0920   Margins Attached edges 08/08/24 0920   Number of days:        Incision 08/12/24 Abdomen Lower;Medial (Active)   Dressing Status Dry;Clean;Intact 08/21/24 2047   Incision Cleansed Cleansed with saline 08/19/24 0901   Dressing/Treatment Dry dressing 08/21/24 1636   Closure Piero 08/19/24 0901   Margins Approximated 08/21/24 0344   Incision Assessment Bleeding 08/21/24 1636   Drainage Amount Small (< 25%) 08/21/24 1636   Drainage Description Serosanguinous 08/21/24 1636   Odor None 08/21/24 2047   Adrienne-incision Assessment Intact 08/21/24 0344   Number of days: 9     Midline wound instructions: remove packing strips and replace with 1/4\" iodoform packing twice daily. Cover with gauze and paper tape.     Elimination:  Continence:   Bowel: No  Bladder: Yes  Urinary Catheter: Insertion Date: 08/17/2024    Colostomy/Ileostomy/Ileal Conduit: No  Rectal Tube-Stool Appearance: Watery  Rectal Tube-Stool Color:  Brown  Rectal Tube-Stool Amount: Large    Date of Last BM: N/A    Intake/Output Summary (Last 24 hours) at 8/22/2024 0734  Last data filed at 8/22/2024 0408  Gross per 24 hour   Intake 120 ml   Output 1425 ml   Net -1305 ml     I/O last 3 completed shifts:  In: 120 [P.O.:120]  Out: 1800 [Urine:1775; Emesis/NG output:25]    Safety Concerns:     At Risk for Falls    Impairments/Disabilities:      Hearing    Nutrition Therapy:  Current Nutrition Therapy:   - Oral Diet:  Clear Liquid and Carb Control 3 carbs/meal (1500kcals/day)    Routes of Feeding: Oral  Liquids: Thin Liquids  Daily Fluid Restriction: no  Last Modified Barium Swallow with Video (Video Swallowing Test): not done    Treatments at the Time of Hospital Discharge:   Respiratory Treatments: RA  Oxygen Therapy:  is not on home oxygen therapy.  Ventilator:    - No ventilator support    Rehab Therapies: Physical Therapy, Occupational Therapy, and Speech/Language Therapy  Weight Bearing Status/Restrictions: No weight bearing restrictions  Other Medical Equipment (for information only, NOT a DME order):  walker  Other Treatments:     Patient's personal belongings (please select all that are sent with patient):  {P DME Belongings:[id number]}    RN SIGNATURE:  {Esignature:[id number]}    CASE MANAGEMENT/SOCIAL WORK SECTION    Inpatient Status Date: 08/02/2024    Readmission Risk Assessment Score:  Readmission Risk              Risk of Unplanned Readmission:  64           Discharging to Facility/ Agency   Name: [facility]   Address:  Phone:report- [phone]  Fax:[phone]    Dialysis Facility (if applicable)   Name:  Address:  Dialysis Schedule:  Phone:  Fax:    / signature: Electronically signed by Elaina Rutherford RN on 8/22/24 at 12:10 PM EDT    PHYSICIAN SECTION    Prognosis: Poor    Condition at Discharge: Stable    Rehab Potential (if transferring to Rehab): Poor    Recommended Labs or Other  Treatments After Discharge: Clear liquid for comfort.        - J tube to be clamped. Okay to place medications through G port and clamp for 30 minutes after administration of medications   Continue TPN.    Follow-up with surgery Dr. Valle ([phone]) in 1 week to reevaluate  GJ tube.  In to be started as an outpatient after outpatient evaluation  Midline packed with 1/4 inch iodoform covered with 4 x 8s gauze and tape to be changed BID     Physician Certification: I certify the above information and transfer of Agustina Fried  is necessary for the continuing treatment of the diagnosis listed and that she requires LTAC for less 30 days.     Update Admission H&P: No change in H&P    PHYSICIAN SIGNATURE:  Electronically signed by Zahida Koenig MD on 8/22/24 at 11:28 AM EDT

## 2024-08-22 NOTE — PROGRESS NOTES
Ph: [phone], Fax: [phone]           [email or web address]               Reason for admission:                 Pain abdomen nausea and vomiting    Brief Summary :     Agustina Fried is being seen by nephrology for CKD       Interval History and plan:     Seen in room  BP is elevated   Urine is 1425 ml +   Creatinine improved 2.3 > 1.8 > 1.2  Bicarbonate improved 13 > 22 > 25  SP extensive surgery  (Diagnostic laparoscopy, ex-lap, jose-en-y gastrojejunostomy bypass, feeding tube placement, and extensive lysis of adhesions )    Continue clonidine patch to 0.3 mg q weekly and amlodipine ( cannot take oral pills ). On IV hydralazine prn for SBP > 170  Keep  Dooley catheter    Continue IVF  Potassium improved   GFR is improving with IVF. She was in prerenal failure from massive fluid loss                     Assessment :       CKD Stage IIIa  Creatinine has been variable in the past she has a history of recurrent SHARRON  Last creatinine was 2 upon discharge from 7/2 hospitalization  she has history of diabetes mellitus hypertension    CHF  Echo (7/2/24)    Limited only for LVEF and RVF.    Image quality is adequate.    Left Ventricle: Normal left ventricular systolic function with a visually estimated EF of 55 - 60%. Normal wall motion.    Right ventricle size is normal. Normal systolic function.    Does not appear volume overloaded      Hypertension   BP: (149-166)/(55-56)  Pulse:  [59-61]   BP goal inpatient 130-140 systolic inpatient        [facility] would like to thank Zahida Koenig MD   for opportunity to serve this patient      Please call with questions at-   24 Hrs Answering service [phone] or  7 am- 5 pm via Perfect serve or cell phone  Dr.Muhammad Dayne Trimble MD       HPI :     Agustina Fried is a 64 y.o. female presented to   the hospital on 8/2/2024 with  pain abdomen nausea and vomiting.  She is known to have nausea vomiting diarrhea for several days because of which she  Facility-Administered Medications: vancomycin (FIRVANQ) 50 MG/ML oral solution 250 mg, 250 mg, Per G Tube, 4 times per day  hydrALAZINE (APRESOLINE) injection 5 mg, 5 mg, IntraVENous, Q6H PRN  PN-Adult Premix 5/20 - Standard Electrolytes - Central Line, , IntraVENous, Continuous TPN  sodium chloride 0.9 % 1,000 mL infusion, , IntraVENous, Continuous  metoclopramide (REGLAN) injection 5 mg, 5 mg, IntraVENous, Q6H  miconazole (MICOTIN) 2 % powder, , Topical, BID  nystatin (MYCOSTATIN) 267500 UNIT/ML suspension 500,000 Units, 5 mL, Oral, 4x Daily  insulin lispro (HUMALOG,ADMELOG) injection vial 0-8 Units, 0-8 Units, SubCUTAneous, Q4H  polycarbophil (FIBERCON) tablet 625 mg, 625 mg, Oral, Daily  [Held by provider] amLODIPine (NORVASC) tablet 5 mg, 5 mg, Oral, Daily  [Held by provider] atorvastatin (LIPITOR) tablet 40 mg, 40 mg, Oral, Nightly  [Held by provider] gabapentin (NEURONTIN) capsule 200 mg, 200 mg, Oral, BID  traZODone (DESYREL) tablet 100 mg, 100 mg, Oral, Nightly  0.9 % sodium chloride infusion, , IntraVENous, PRN  0.9 % sodium chloride infusion, , IntraVENous, PRN  cloNIDine (CATAPRES) 0.3 MG/24HR 1 patch, 1 patch, TransDERmal, Weekly  phenol 1.4 % mouth spray 1 spray, 1 spray, Mouth/Throat, Q2H PRN  pantoprazole (PROTONIX) 40 mg in sodium chloride (PF) 0.9 % 10 mL injection, 40 mg, IntraVENous, Q12H  sodium chloride flush 0.9 % injection 5-40 mL, 5-40 mL, IntraVENous, 2 times per day  sodium chloride flush 0.9 % injection 5-40 mL, 5-40 mL, IntraVENous, PRN  0.9 % sodium chloride infusion, , IntraVENous, PRN  enoxaparin (LOVENOX) injection 40 mg, 40 mg, SubCUTAneous, Daily  thiamine (B-1) injection 100 mg, 100 mg, IntraVENous, Daily  HYDROmorphone (DILAUDID) injection 0.25 mg, 0.25 mg, IntraVENous, Q3H PRN **OR** HYDROmorphone (DILAUDID) injection 0.5 mg, 0.5 mg, IntraVENous, Q3H PRN  sodium chloride flush 0.9 % injection 5-40 mL, 5-40 mL, IntraVENous, 2 times per day  sodium chloride flush 0.9 % injection  [phone]  Perfect serve, or cell phone 7 am - 5pm  Glen Plascencia MD   [email or web address]

## 2024-08-27 ENCOUNTER — TELEPHONE (OUTPATIENT)
Dept: SURGERY | Age: 64
End: 2024-08-27

## 2024-08-27 NOTE — OP NOTE
Operative Note      Patient: Agustina Fried  YOB: 1960  MRN: [id number]    Date of Procedure: 8/12/2024    Pre-Op Diagnosis Codes:      * Duodenal obstruction [K31.5]    Post-Op Diagnosis: Same       Procedure(s):  diagnostic laparoscopy, exploratory laparotomy, jose-en-y gastrojejunostomy bypass, feeding tube placement, and extensive lysis of adhesions    Surgeon(s):  Julio Valle MD    Assistant:   Surgical Assistant: Yari Felder  Resident: Ysabel Tao MD    Anesthesia: General    Estimated Blood Loss (mL): Minimal    Complications: None    Specimens:   ID Type Source Tests Collected by Time Destination   A : PERITONEUM NODULE Tissue Tissue SURGICAL PATHOLOGY Julio Valle MD 8/12/2024 1443    B : LIVER NODULE Tissue Tissue SURGICAL PATHOLOGY Julio Valle MD 8/12/2024 1520    C : A) omentum nodule Tissue Tissue SURGICAL PATHOLOGY Julio Valle MD 8/12/2024 1535        Implants:  Implant Name Type Inv. Item Serial No.  Lot No. LRB No. Used Action   TUBE ENTRL FEED GASTRO-JEJUNAL 18 FRX45 CM 7-10 CC [employer] - [id number]  TUBE ENTRL FEED GASTRO-JEJUNAL 18 FRX45 CM 7-10 CC ALFREDO-KEY  [facility]- [id number] N/A 1 Implanted     Findings:  Infection Present At Time Of Surgery (PATOS) (choose all levels that have infection present):  No infection present  Other Findings: ***    Detailed Description of Procedure:   ***    Electronically signed by Julio Valle MD on 8/27/2024 at 2:50 PM     risk was explained to patient and family over multiple settings.  Risks, benefits and alternatives of palliative surgery discussed with patient and family and they wishes to proceed with surgery.    Operative procedure: Patient was brought to the operating room and general endotracheal anesthesia was given.  Abdomen was prepped and draped in a sterile manner.  Timeout procedure was performed according to hospital policy.  A small midline incision was given and peritoneal cavity was entered by cutdown technique.  A port was placed and pneumoperitoneum created.  Extensive adhesions were noted and decision made to do open surgery.  Midline incision was given and fascia was divided carefully.  Extensive lysis of adhesions was done and multiple small bowel loops were released from abdominal wall.  A peritoneal and omental nodule was excised and sent to pathology.  Small bowel was followed distally and multiple interloop adhesions were released until we have enough length for bypass.  Once again thorough evaluation of intraperitoneal cavity was done.  Liver nodule was noted, excised and sent to pathology.  Hemostasis was checked and achieved.  Patient's stomach is still dilated and she is going to have gastroparesis for some time.  Billroth II anastomosis is going to dump all the bile into stomach and create more problems.  Thus decision made to do Viviana-en-Y configuration palliative bypass.  Small bowel was divided at 20 cm.  Distal bowel loop was anastomosed to stomach using the SERGIO stapler.  50 cm distally in jejunojejunostomy was performed between the Viviana limb and hepatobiliary pancreatic limb using SERGIO stapler.  Anastomosis was adequate and hemostatic.  Given dilated stomach, decision made to do gastrojejunal tube.  A gastrojejunal tube was brought into the peritoneal cavity and placed to the stomach into the small bowel after doing a gastrotomy and pursestring.  Stomach was sutured to the abdominal wall at the site

## 2024-08-28 NOTE — PROGRESS NOTES
[facility] note  Observations:    Engaged in Activity / Process and Self -disclosed: Yes  Applies Topic to self: Yes  Able to give Constructive Feedback: Yes  Affect: anxious  Degree of Insightful Thinking 6  Notes:  [id number]  Pt processed anxiety. Pt brought her boyfriend David to [facility] family day. Pt processing triggers and cravings as well as childhood being raised by an active alcoholic      ADELSO SanchezW             Patient's blood sugar 468. MD Rosemarie Escobar notified.

## 2025-02-15 NOTE — ED PROVIDER NOTES
[facility]  ED  EMERGENCY DEPARTMENT ENCOUNTER        Pt Name: Jade Franco  MRN: [id number]  [place] 1960  Date of evaluation: 11/11/2020  Provider: RANGEL Jacobsen  PCP: Catalina Austin    This patient was not seen and evaluated by the attending physician No att. providers found. I have evaluated this patient. My supervising physician was available for consultation. CHIEF COMPLAINT       Chief Complaint   Patient presents with    Head Congestion     patient states she has a lot of pressure in her face, has been going on for two days    Cough    Pharyngitis       HISTORY OF PRESENT ILLNESS   (Location/Symptom, Timing/Onset, Context/Setting, Quality, Duration, Modifying Factors, Severity)  Note limiting factors. Jade Franco is a 63 y.o. female who presents via private vehicle from her home for evaluation of head congestion cough and sore throat. Onset was 2 days ago. Duration has been progressive since the onset. Context includes patient notes she started with facial pain about 2 days ago. She has had nasal congestion, no significant runny nose. She notes dental pain especially on the right side pain up into the top part of her cheek. She endorses a mild nonproductive cough and mild sore throat as well. She denies any headaches neck pain or fevers. She denies any ear pain or ear drainage. She denies any eye pain or eye drainage. She denies any chest pain cough or shortness of breath. She not tried any medicine for symptoms, nothing makes it better or worse. Nursing Notes were all reviewed and agreed with or any disagreements were addressed  in the HPI. REVIEW OF SYSTEMS    (2-9 systems for level 4, 10 or more for level 5)     Review of Systems    Positives and Pertinent negatives as per HPI. Except as noted abovein the ROS, all other systems were reviewed and negative.        PAST MEDICAL HISTORY     Past Medical History:   Diagnosis Date    Abnormal brain MRI 7/20/2017    Partially empty sella and minimal chronic small vessel ischemic disease    Acute bilateral low back pain without sciatica 11/2/2016    SHARRON (acute kidney injury) ([address] 7/5/2017    Arthritis     back    Bipolar disorder ([address] 10/18/2008    CAD (coronary artery disease)     stent placed 6/8/20    Cancer [facility]) 2015    bilateral breast:s/p lumpectomy/radiation:under care care of breast specialist:Dr. Boone     Carotid stenosis, bilateral:<50%:per US 7/2016 7/15/2016    Carpal tunnel syndrome 10/18/2008    Cervical cancer screening 2014    Nml per pt'.  Coronary artery disease of native artery of native heart with stable angina pectoris ([address] 6/9/2020    DDD (degenerative disc disease), lumbar 7/18/2018    Depression     under care of pschiatrist:Dr. 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     Scoliosis     Spondylosis of lumbar region without myelopathy or radiculopathy 3/10/2017    Transient cerebral ischemia 07/15/2016    TIA:7/10/16    Unspecified cerebral artery occlusion with cerebral infarction     TIA         SURGICAL HISTORY     Past Surgical History:   Procedure Laterality Date    BREAST LUMPECTOMY  2015    Bilateral:breast cancer    CARDIAC CATHETERIZATION  06/08/2020    Dr. Soco Hennessy), DAYANA to Diag 1    HYSTERECTOMY      Benign:no cervical cancer per pt'    KIDNEY REMOVAL      right    OTHER SURGICAL HISTORY Right     orif right ankle    TUBAL LIGATION           CURRENTMEDICATIONS       Previous Medications    ASPIRIN 81 MG TABLET    Take 81 mg by mouth daily    BLOOD GLUCOSE TEST STRIPS (TRUE METRIX BLOOD GLUCOSE TEST) STRIP    USE AS DIRECTED TO TEST 4 TIMES A DAY    BLOOD PRESSURE MONITOR GINA    Check BP at home once or twice a day    CALCIUM CARBONATE-VITAMIN D (CALTRATE) 600-400 MG-UNIT TABS PER TAB        CETIRIZINE (ZYRTEC) 10 MG TABLET    Take 10 mg by mouth daily    CLONAZEPAM (KLONOPIN) 1 MG TABLET    Take 1 mg by mouth as needed. Alexus Bazan EMPAGLIFLOZIN-METFORMIN HCL 12.5-1000 MG TABS    Take 2 tablets by mouth daily    GABAPENTIN (NEURONTIN) 600 MG TABLET    Take 600 mg by mouth 3 times daily    GLUCAGON 1 MG INJECTION    Inject 1 mg into the muscle See Admin Instructions Follow package directions for low blood sugar. INSULIN GLARGINE (LANTUS SOLOSTAR) 100 UNIT/ML INJECTION PEN    Inject 25 Units into the skin every morning    INSULIN PEN NEEDLE (UNIFINE PENTIPS) 32G X 4 MM MISC    USE AS DIRECTED 3 TIMES A DAY    INSULIN SYRINGE-NEEDLE U-100 31G X 5/16\" 0.3 ML MISC    USE 3 TIMES A DAY BEFORE MEALS    LETROZOLE (FEMARA) 2.5 MG TABLET    TAKE 1 TABLET BY MOUTH EVERY DAY    LIRAGLUTIDE (VICTOZA) 18 MG/3ML SOPN SC INJECTION    Inject 1.8 mg into the skin daily    LISINOPRIL (PRINIVIL;ZESTRIL) 10 MG TABLET    Take 10 mg by mouth daily    NITROGLYCERIN (NITROSTAT) 0.4 MG SL TABLET    up to max of 3 total doses. If no relief after 1 dose, call 911. OXYCODONE-ACETAMINOPHEN (PERCOCET) 5-325 MG PER TABLET    Take 1 tablet by mouth every 6 hours as needed for Pain. Alexus Bazan     PALIPERIDONE (INVEGA) 9 MG EXTENDED RELEASE TABLET    Take 9 mg by mouth every morning     PANTOPRAZOLE (PROTONIX) 40 MG TABLET    Take 1 tablet by mouth every morning (before breakfast)    SIMVASTATIN (ZOCOR) 20 MG TABLET    Take 20 mg by mouth nightly    TICAGRELOR (BRILINTA) 90 MG TABS TABLET    Take 1 tablet by mouth 2 times daily    TRAZODONE (DESYREL) 100 MG TABLET    Take 100 mg by mouth nightly         ALLERGIES     Morphine; Penicillins;  Codeine; and Penicillin g    FAMILYHISTORY       Family History   Problem Relation Age of Onset    Cancer Mother         breast    Cancer Father     Heart Failure Neg Hx     High Cholesterol Neg Hx     Hypertension Neg Hx     Migraines Neg Hx     Rashes/Skin Problems Neg Hx     Seizures Neg Hx     Stroke Neg Hx     Thyroid Disease Neg Hx     Diabetes Neg Hx           SOCIAL HISTORY       Social History     Socioeconomic History    Marital status:      Spouse name: None    Number of children: None    Years of education: None    Highest education level: None   Occupational History    Occupation:    Social Needs    Financial resource strain: None    Food insecurity     Worry: None     Inability: None    Transportation needs     Medical: None     Non-medical: None   Tobacco Use    Smoking status: Former Smoker     Packs/day: 0.50     Years: 20.00     Pack years: 10.00     Types: Cigarettes, Cigars     Last attempt to quit: 7/3/2014     Years since quittin.3    Smokeless tobacco: Never Used   Substance and Sexual Activity    Alcohol use: No     Alcohol/week: 0.0 standard drinks    Drug use: No    Sexual activity: Never   Lifestyle    Physical activity     Days per week: None     Minutes per session: None    Stress: None   Relationships    Social connections     Talks on phone: None     Gets together: None     Attends Druze service: None     Active member of club or organization: None     Attends meetings of clubs or organizations: None     Relationship status: None    Intimate partner violence     Fear of current or ex partner: None     Emotionally abused: None     Physically abused: None     Forced sexual activity: None   Other Topics Concern    None   Social History Narrative    None       SCREENINGS    [place] Coma Scale  Eye Opening: Spontaneous  Best Verbal Response: Oriented  Best Motor Response: Obeys commands  Alberto Coma Scale Score: 15        PHYSICAL EXAM    (up to 7 for level 4, 8 or more for level 5)     ED Triage Vitals [11/11/20 1718]   BP Temp Temp Source Pulse Resp SpO2 Height Weight   108/70 98.4 °F (36.9 °C) Oral 65 16 98 % 5' 3\" (1.6 m) 143 lb (64.9 kg)       Physical Exam  Vitals signs and nursing note reviewed. Constitutional:       General: She is awake. She is not in acute distress. Appearance: Normal appearance. She is well-developed. She is not ill-appearing, toxic-appearing or diaphoretic. HENT:      Head: Normocephalic and atraumatic. No right periorbital erythema or left periorbital erythema. Jaw: There is normal jaw occlusion. No trismus, pain on movement or malocclusion. Right Ear: Hearing, tympanic membrane, ear canal and external ear normal. No mastoid tenderness. Left Ear: Hearing, tympanic membrane, ear canal and external ear normal. No mastoid tenderness. Nose: Congestion present. No rhinorrhea. Right Nostril: No septal hematoma. Left Nostril: No septal hematoma. Right Sinus: Maxillary sinus tenderness present. No frontal sinus tenderness. Left Sinus: Maxillary sinus tenderness present. No frontal sinus tenderness. Mouth/Throat:      Lips: Pink. Mouth: Mucous membranes are moist. No angioedema. Palate: No lesions. Pharynx: Oropharynx is clear. Uvula midline. No posterior oropharyngeal erythema or uvula swelling. Tonsils: No tonsillar exudate or tonsillar abscesses. Eyes:      General:         Right eye: No discharge. Left eye: No discharge. Extraocular Movements: Extraocular movements intact. Conjunctiva/sclera: Conjunctivae normal.      Pupils: Pupils are equal, round, and reactive to light. Neck:      Musculoskeletal: Normal range of motion and neck supple. No neck rigidity or muscular tenderness.       Comments: No meningismus  Cardiovascular:      Rate and Rhythm: Normal rate and regular rhythm. Pulses: Normal pulses. Heart sounds: Normal heart sounds. No murmur. No friction rub. No gallop. Pulmonary:      Effort: Pulmonary effort is normal. No respiratory distress. Breath sounds: Normal breath sounds. No wheezing or rales. Abdominal:      General: There is no distension. Palpations: Abdomen is soft. Tenderness: There is no abdominal tenderness. There is no guarding or rebound. Musculoskeletal:      Right lower leg: No edema. Left lower leg: No edema. Lymphadenopathy:      Cervical: No cervical adenopathy. Skin:     General: Skin is warm and dry. Capillary Refill: Capillary refill takes less than 2 seconds. Findings: No rash. Neurological:      Mental Status: She is alert, oriented to person, place, and time and easily aroused. Mental status is at baseline. Sensory: No sensory deficit. Motor: No weakness. Psychiatric:         Behavior: Behavior normal. Behavior is cooperative. DIAGNOSTIC RESULTS   LABS:    Labs Reviewed   POCT GLUCOSE - Abnormal; Notable for the following components:       Result Value    POC Glucose 434 (*)     All other components within normal limits    Narrative:     Performed at:  [address],  [place], [address] [employer]   Phone [phone]   RAPID INFLUENZA A/B ANTIGENS    Narrative:     Performed at:  [address],  [place], [address] [employer]   Phone [phone] A THROAT    Narrative:     Performed at:  [address],  [place], [address] [employer]   Phone [phone]   CULTURE, BETA STREP CONFIRM PLATES   OHQQV-54   QNOQE-56   COVID-19   COVID-19   COVID-19   POCT GLUCOSE       All other labs were within normal range or not returned as of this dictation. EKG:  All EKG's are interpreted by the Emergency Department Physician who either signs orCo-signs this chart in the absence of a cardiologist.  Please see their note for interpretation of EKG. RADIOLOGY:   Non-plain film images such as CT, Ultrasound and MRI are read by the radiologist. Plain radiographic images are visualized andpreliminarily interpreted by the  ED Provider with the below findings:        Interpretation [place] [place] Radiologist below, if available at the time of this note:    XR CHEST PORTABLE   Final Result   No acute airspace disease. Xr Chest Portable    Result Date: 11/11/2020  EXAMINATION: ONE XRAY VIEW OF THE CHEST 11/11/2020 9:12 pm COMPARISON: 11/04/2020 HISTORY: ORDERING SYSTEM PROVIDED HISTORY: cough TECHNOLOGIST PROVIDED HISTORY: Reason for exam:->cough FINDINGS: Patient is rotated. No confluent airspace disease. No pleural effusion or pneumothorax. Cardiac and mediastinal silhouettes are similar to prior. Clips are seen within the upper abdomen. Moderate stool within the colon. No acute airspace disease. PROCEDURES   Unless otherwise noted below, none     Procedures    CRITICAL CARE TIME   N/A    CONSULTS:  None      EMERGENCY DEPARTMENT COURSE and DIFFERENTIALDIAGNOSIS/MDM:   Vitals:    Vitals:    11/11/20 1718 11/11/20 2128 11/11/20 2159   BP: 108/70 (!) 145/72 (!) 143/66   Pulse: 65 66 77   Resp: 16 15 16   Temp: 98.4 °F (36.9 °C) 98.2 °F (36.8 °C) 99.1 °F (37.3 °C)   TempSrc: Oral Oral Oral   SpO2: 98% 100% 100%   Weight: 143 lb (64.9 kg)     Height: 5' 3\" (1.6 m)         Patient was given thefollowing medications:  Medications - No data to display    PDMP Monitoring:    Last PDMP Denlizbeth Forrester as Reviewed [facility]):  Review User Review Instant Review Result          Last Controlled Substance Monitoring Documentation      ED from 12/6/2017 in [facility]  ED   Comments  Patient left with discharge instructions, prescription x 1, and all belongings in hand.   filed at 12/06/2017 2125        Urine Drug Screenings (1 yr)     Urine Drug Screen  Collected: 7/10/2016  6:00 PM (Final result)    Complete Results              Medication Contract and Consent for Opioid Use Documents Filed      No documents found                MDM:   Patient seen and evaluated. Old records reviewed. Diagnostic testing reviewed and results discussed. I have independently evaluated this patient based upon my scope of practice. Supervising physician was in the department for consultation as needed. Patient is a 69-year-old female who presents for evaluation of head congestion. On exam she is alert oriented afebrile well-perfused hemodynamically stable nontoxic in appearance. She is in no acute respiratory distress. Vital signs remained stable throughout her ER stay. On physical exam she does not have any facial swelling or redness but she does have bilateral maxillary tenderness. No dental abnormality. She has a clear posterior oropharynx I have low concern for deep neck space infection, no trismus no angioedema. She has no meningeal signs. She is no evidence of acute mastoid tenderness or acute mastoiditis. Heart and lung sounds are without focal process, I do not appreciate a cough. She has no focal neuro deficits. Work-up in the emergency department included labs and imaging. ED Course as of Nov 13 0212   Wed Nov 11, 2020   2344 Not DKA     pH, Kyle: 7.432 [CS]   7388 At baseline    Hemoglobin Quant(!): 10.4 [CS]      ED Course User Index  [CS] RANGEL Luna     Patient's glucose is elevated. Glucosuria noted, no ketonuria I have low concern for DKA, which was purpose of UA. Urine is positive for nitrates she is asymptomatic but will start her on an antibiotic. Given her hyperglycemia, insulin given in the emergency department. She had improved glucose response.   CT maxillofacial with contrast was obtained which reveals right maxillary sinusitis with minimal involvement of the right ethmoid sinus as well. She has no evidence of mastoiditis, no evidence of mass or abscess seen to the pharynx or larynx. Chest x-ray was obtained no evidence of acute airspace disease. Rapid flu was obtained, it is negative. Covid is also negative. Patient will be started on doxycycline antibiotic. At this time I believe patient is reasonable candidate for discharge home with close follow-up with her PCP and strict return precautions. Based on patient's clinical history and clinical findings I currently estimate there is low probability for: viral URI, nasal congestion, bacterial sinusitis, viral/strep pharyngitis, otitis media, otitis externa, RPA, PTA. Additionally, we discussed signs and symptoms that would be concerning for prompt re-evaluation such as fevers, vomiting, worsening headache, SOB, chest pain, new or worsening dizziness. Pt is in agreement with the current plan and all questions were addressed. Discharge Time out:  CC Reviewed Yes   Test Results Yes     Vitals:    11/11/20 2159   BP: (!) 143/66   Pulse: 77   Resp: 16   Temp: 99.1 °F (37.3 °C)   SpO2: 100%          FINAL IMPRESSION      1. Acute non-recurrent sinusitis, unspecified location    2. Hyperglycemia    3. Acute upper respiratory infection          DISPOSITION/PLAN   DISPOSITION        PATIENT REFERREDTO:  No follow-up provider specified.     DISCHARGE MEDICATIONS:  New Prescriptions    No medications on file       DISCONTINUED MEDICATIONS:  Discontinued Medications    No medications on file              (Please note that portions ofthis note were completed with a voice recognition program.  Efforts were made to edit the dictations but occasionally words are mis-transcribed.)    Jeanette Gandara, [id number] (electronically signed)       Laura Fernandes  11/14/20 3147 None

## (undated) DEVICE — 30977 SEE SHARP - ENHANCED INTRAOPERATIVE LAPAROSCOPE CLEANING & DEFOGGING: Brand: 30977 SEE SHARP - ENHANCED INTRAOPERATIVE LAPAROSCOPE CLEANING & DEFOGGING

## (undated) DEVICE — GOWN SIRUS NONREIN XL W/TWL: Brand: MEDLINE INDUSTRIES, INC.

## (undated) DEVICE — STAPLER INT H4.4X1.5MM DIA75MM 0DEG TI UNIV 6 ROW CART

## (undated) DEVICE — TIP COVER ACCESSORY

## (undated) DEVICE — SOLUTION IRRIG 1000ML STRL H2O USP PLAS POUR BTL

## (undated) DEVICE — ENDOSCOPIC KIT 2 12 FT OP4 DE2 GWN SYR

## (undated) DEVICE — CANNULA NSL AD TBNG L7FT PVC STR NONFLARED PRNG O2 DEL W STD

## (undated) DEVICE — SUTURE PERMAHAND SZ 4-0 L12X30IN NONABSORBABLE BLK SILK A303H

## (undated) DEVICE — CLIP SM RED INTERN HMOCLP TITAN LIGATING

## (undated) DEVICE — TROCAR: Brand: KII FIOS FIRST ENTRY

## (undated) DEVICE — CONMED SCOPE SAVER BITE BLOCK, 20X27 MM: Brand: SCOPE SAVER

## (undated) DEVICE — [HIGH FLOW INSUFFLATOR,  DO NOT USE IF PACKAGE IS DAMAGED,  KEEP DRY,  KEEP AWAY FROM SUNLIGHT,  PROTECT FROM HEAT AND RADIOACTIVE SOURCES.]: Brand: PNEUMOSURE

## (undated) DEVICE — BLADELESS OBTURATOR: Brand: WECK VISTA

## (undated) DEVICE — GLOVE SURG SZ 7 L12IN FNGR THK79MIL GRN LTX FREE

## (undated) DEVICE — BITE BLOCK ENDOSCP AD 60 FR W/ ADJ STRP PLAS GRN BLOX

## (undated) DEVICE — DRAPE,LAP,CHOLE,W/TROUGHS,STERILE: Brand: MEDLINE

## (undated) DEVICE — ELECTRODE ECG MONITR FOAM TEAR DROP ADLT RED

## (undated) DEVICE — SPONGE LAP W18XL18IN WHT STRUNG W/ RNG W/OUT LOOP RADPQ ST

## (undated) DEVICE — SUTURE PERMAHAND SZ 2-0 L18IN NONABSORBABLE BLK L26MM SH C012D

## (undated) DEVICE — STAPLER INT L75MM H1.5X1.8X2MM STD TI 6 ROW LIN CUT

## (undated) DEVICE — COLUMN DRAPE

## (undated) DEVICE — APPLICATOR MEDICATED 26 CC SOLUTION HI LT ORNG CHLORAPREP

## (undated) DEVICE — REDUCER: Brand: ENDOWRIST

## (undated) DEVICE — STAPLER EXT SKIN 35 WIDE S STL STPL SQUEEZE HNDL VISISTAT

## (undated) DEVICE — Z DISCONTINUED USE 2276105 GOWN PROTCT UNIV CHST W28IN L49IN SL 24IN BLU SPUNBOND FLM

## (undated) DEVICE — ELECTRODE NDL 2.8IN COAT VALLEYLAB

## (undated) DEVICE — GLOVE BIOGEL POWDER FREE SZ 8

## (undated) DEVICE — ELECTRODE,ECG,STRESS,FOAM,3PK: Brand: MEDLINE

## (undated) DEVICE — SUTURE PERMAHAND SZ 3-0 L18IN NONABSORBABLE BLK L26MM SH C013D

## (undated) DEVICE — CANNULA SEAL

## (undated) DEVICE — SUTURE VICRYL + SZ 3-0 L18IN ABSRB UD SH 1/2 CIR TAPERCUT NDL VCP864D

## (undated) DEVICE — 1LYRTR 16FR10ML100%SIL UMS SNP: Brand: MEDLINE INDUSTRIES, INC.

## (undated) DEVICE — SYRINGE CATH TIP 50ML

## (undated) DEVICE — SOLUTION IV IRRIG POUR BRL 0.9% SODIUM CHL 2F7124

## (undated) DEVICE — TOWEL,OR,DSP,ST,BLUE,DLX,8/PK,10PK/CS: Brand: MEDLINE

## (undated) DEVICE — TUBING, SUCTION, 1/4" X 12', STRAIGHT: Brand: MEDLINE

## (undated) DEVICE — FORCEP BX STD CAP 240CM RAD JAW 4

## (undated) DEVICE — SKIN AFFIX SURG ADHESIVE 72/CS 0.55ML: Brand: MEDLINE

## (undated) DEVICE — ADHESIVE SKIN CLOSURE 0.7CC TOP MICROBIAL APPL DERMBND ADV

## (undated) DEVICE — SUTURE PDS II SZ 0 L60IN ABSRB VLT L48MM CTX 1/2 CIR Z990G

## (undated) DEVICE — GLOVE,SURG,SENSICARE SLT,LF,PF,7: Brand: MEDLINE

## (undated) DEVICE — TOWEL,OR,DSP,ST,GREEN,DLX,XR,4/PK,20PK/C: Brand: MEDLINE

## (undated) DEVICE — Device

## (undated) DEVICE — AGENT HEMOSTATIC SURG ORIGINAL ABS 2X14IN LOOSE KNIT 12/CA

## (undated) DEVICE — GLOVE SURG SZ 7 L12IN FNGR THK75MIL WHT LTX POLYMER BEAD

## (undated) DEVICE — SHEET,DRAPE,53X77,STERILE: Brand: MEDLINE

## (undated) DEVICE — NEPTUNE E-SEP SMOKE EVACUATION PENCIL, COATED, 70MM BLADE, PUSH BUTTON SWITCH: Brand: NEPTUNE E-SEP

## (undated) DEVICE — TOWEL,STOP FLAG GOLD N-W: Brand: MEDLINE

## (undated) DEVICE — PAD,NON-ADHERENT,3X8,STERILE,LF,1/PK: Brand: MEDLINE

## (undated) DEVICE — CANNULA NSL AD L7FT DIV O2 CO2 W/ M LUERLOCK TRMPT CONN

## (undated) DEVICE — RESERVOIR,SUCTION,100CC,SILICONE: Brand: MEDLINE

## (undated) DEVICE — BLADE,CARBON-STEEL,10,STRL,DISPOSABLE,TB: Brand: MEDLINE

## (undated) DEVICE — PENCIL ES CRD L10FT HND SWCHING ROCK SWCH W/ EDGE COAT BLDE

## (undated) DEVICE — SPONGE LAP W18XL18IN WHT COT 4 PLY FLD STRUNG RADPQ DISP ST

## (undated) DEVICE — GLOVE ORANGE PI 7   MSG9070

## (undated) DEVICE — TUBING FLTR PLUME AWAY EVAC W/ SUCT DEV DISP PUREVIEW

## (undated) DEVICE — CANNULA NSL AD 7 FT END-TIDAL CARBON DIOX

## (undated) DEVICE — SUTURE ABSORBABLE MONOFILAMENT 0 CTX 60 IN VIO PDS + PDP990G

## (undated) DEVICE — RELOAD STPL L75MM OPN H3.8MM CLS 1.5MM WIRE DIA0.2MM REG

## (undated) DEVICE — GOWN,SIRUS,POLYRNF,BRTHSLV,XL,30/CS: Brand: MEDLINE

## (undated) DEVICE — COTTONBALL L DIA1.25IN 100% HIGHLY ABSRB BLEACH COT WHT SFT

## (undated) DEVICE — DRAIN SURG 15FR SIL RND CHN W/ TRCR FULL FLUT DBL WRP TRAD

## (undated) DEVICE — SUTURE MCRYL + SZ 4-0 L18IN ABSRB UD L19MM PS-2 3/8 CIR MCP496G

## (undated) DEVICE — SUTURE VCRL + SZ 3-0 L18IN ABSRB UD SH 1/2 CIR TAPERCUT NDL VCP864D

## (undated) DEVICE — ARM DRAPE

## (undated) DEVICE — APPLIER CLP L9.375IN APER 2.1MM CLS L3.8MM 20 SM TI CLP

## (undated) DEVICE — GLOVE SURG SZ 65 THK91MIL LTX FREE SYN POLYISOPRENE

## (undated) DEVICE — TRAP FLUID

## (undated) DEVICE — SEALER ONE-STEP 23CM LIGASURE MARYLAND XP

## (undated) DEVICE — FORCEPS BX L240CM JAW DIA2.4MM ORNG L CAP W/ NDL DISP RAD

## (undated) DEVICE — TROCAR: Brand: KII SLEEVE

## (undated) DEVICE — KIT INF CTRL 2OZ LUB TBNG L12FT DBL END BRSH SYR OP4

## (undated) DEVICE — SOLUTION IRRIG 2000ML STRL H2O UROMATIC PLAS CONT USP

## (undated) DEVICE — TUBING, SUCTION, 3/16" X 12', STRAIGHT: Brand: MEDLINE

## (undated) DEVICE — INVIEW CLEAR LEGGINGS: Brand: CONVERTORS

## (undated) DEVICE — SPONGE,LAP,18"X18",DLX,XR,ST,5/PK,40/PK: Brand: MEDLINE

## (undated) DEVICE — LIQUIBAND RAPID ADHESIVE 36/CS 0.8ML: Brand: MEDLINE

## (undated) DEVICE — WOUND RETRACTOR AND PROTECTOR: Brand: ALEXIS WOUND PROTECTOR-RETRACTOR

## (undated) DEVICE — STAPLER INT L75MM CUT LN L73MM STPL LN L77MM BLU B FRM 8

## (undated) DEVICE — GLOVE,SURG,SENSICARE SLT,LF,PF,6.5: Brand: MEDLINE

## (undated) DEVICE — GENERAL LAPAROSCOPIC: Brand: MEDLINE INDUSTRIES, INC.

## (undated) DEVICE — VESSEL SEALER EXTEND: Brand: ENDOWRIST

## (undated) DEVICE — SUTURE MONOCRYL + SZ 4-0 L27IN ABSRB UD L19MM PS-2 3/8 CIR MCP426H

## (undated) DEVICE — 1960 FOAM BLOCK NEEDLE COUNTER: Brand: DEVON

## (undated) DEVICE — SEAL

## (undated) DEVICE — FORCEPS BX L240CM JAW DIA2.8MM L CAP W/ NDL MIC MESH TOOTH

## (undated) DEVICE — ELECTRO LUBE IS A SINGLE PATIENT USE DEVICE THAT IS INTENDED TO BE USED ON ELECTROSURGICAL ELECTRODES TO REDUCE STICKING.: Brand: KEY SURGICAL ELECTRO LUBE

## (undated) DEVICE — SUTURE MCRYL SZ 4 0 L18IN ABSRB UD P 3 3 8 CIR REV CUT NDL MCP494G

## (undated) DEVICE — TOTAL TRAY, DB, 100% SILI FOLEY, 16FR 10: Brand: MEDLINE

## (undated) DEVICE — 4-PORT MANIFOLD: Brand: NEPTUNE 2

## (undated) DEVICE — COVER,TABLE,44X90,STERILE: Brand: MEDLINE

## (undated) DEVICE — DRAPE,UNDERBUTTOCKS,PCH,STERILE: Brand: MEDLINE

## (undated) DEVICE — SEALER TISS L20CM DIA13MM ADV BPLR L CRV JAW OPN APPRCH

## (undated) DEVICE — CONMED GOLDLINE ELECTROSURGICAL HANDPIECE, HAND CONTROLLED WITH ULTRACLEAN BLADE ELECTRODE, BUTTON SWITCH, SAFETY HOLSTER AND 15' (4.6 M) CABLE: Brand: CONMED GOLDLINE

## (undated) DEVICE — ENDO CARRY-ON PROCEDURE KIT INCLUDES SUCTION TUBING, LUBRICANT, GAUZE, BIOHAZARD STICKER, TRANSPORT PAD AND INTERCEPT BEDSIDE KIT.: Brand: ENDO CARRY-ON PROCEDURE KIT

## (undated) DEVICE — BLADE ES L6IN ELASTOMERIC COAT EXT DURABLE BEND UPTO 90DEG

## (undated) DEVICE — 10FR FRAZIER SUCTION HANDLE: Brand: CARDINAL HEALTH